# Patient Record
Sex: FEMALE | Race: WHITE | Employment: OTHER | URBAN - METROPOLITAN AREA
[De-identification: names, ages, dates, MRNs, and addresses within clinical notes are randomized per-mention and may not be internally consistent; named-entity substitution may affect disease eponyms.]

---

## 2017-01-10 ENCOUNTER — TELEPHONE (OUTPATIENT)
Dept: PULMONOLOGY | Age: 72
End: 2017-01-10

## 2017-01-10 RX ORDER — ALBUTEROL SULFATE 2.5 MG/3ML
2.5 SOLUTION RESPIRATORY (INHALATION) 4 TIMES DAILY
COMMUNITY
End: 2020-03-03

## 2017-01-27 ENCOUNTER — OFFICE VISIT (OUTPATIENT)
Dept: SURGERY | Age: 72
End: 2017-01-27

## 2017-01-27 ENCOUNTER — PROCEDURE VISIT (OUTPATIENT)
Dept: SURGERY | Age: 72
End: 2017-01-27

## 2017-01-27 VITALS
DIASTOLIC BLOOD PRESSURE: 96 MMHG | HEIGHT: 64 IN | WEIGHT: 218 LBS | SYSTOLIC BLOOD PRESSURE: 158 MMHG | BODY MASS INDEX: 37.22 KG/M2

## 2017-01-27 DIAGNOSIS — Z86.718 HISTORY OF DVT (DEEP VEIN THROMBOSIS): Chronic | ICD-10-CM

## 2017-01-27 DIAGNOSIS — I71.40 AAA (ABDOMINAL AORTIC ANEURYSM) WITHOUT RUPTURE: Primary | Chronic | ICD-10-CM

## 2017-01-27 DIAGNOSIS — I71.40 AAA (ABDOMINAL AORTIC ANEURYSM) WITHOUT RUPTURE: Chronic | ICD-10-CM

## 2017-01-27 DIAGNOSIS — I10 ESSENTIAL HYPERTENSION: Chronic | ICD-10-CM

## 2017-01-27 PROCEDURE — 3014F SCREEN MAMMO DOC REV: CPT | Performed by: SURGERY

## 2017-01-27 PROCEDURE — 1090F PRES/ABSN URINE INCON ASSESS: CPT | Performed by: SURGERY

## 2017-01-27 PROCEDURE — 3017F COLORECTAL CA SCREEN DOC REV: CPT | Performed by: SURGERY

## 2017-01-27 PROCEDURE — 1036F TOBACCO NON-USER: CPT | Performed by: SURGERY

## 2017-01-27 PROCEDURE — G8427 DOCREV CUR MEDS BY ELIG CLIN: HCPCS | Performed by: SURGERY

## 2017-01-27 PROCEDURE — 99214 OFFICE O/P EST MOD 30 MIN: CPT | Performed by: SURGERY

## 2017-01-27 PROCEDURE — 1123F ACP DISCUSS/DSCN MKR DOCD: CPT | Performed by: SURGERY

## 2017-01-27 PROCEDURE — G8399 PT W/DXA RESULTS DOCUMENT: HCPCS | Performed by: SURGERY

## 2017-01-27 PROCEDURE — 93978 VASCULAR STUDY: CPT | Performed by: SURGERY

## 2017-01-27 PROCEDURE — G8598 ASA/ANTIPLAT THER USED: HCPCS | Performed by: SURGERY

## 2017-01-27 PROCEDURE — G8484 FLU IMMUNIZE NO ADMIN: HCPCS | Performed by: SURGERY

## 2017-01-27 PROCEDURE — G8419 CALC BMI OUT NRM PARAM NOF/U: HCPCS | Performed by: SURGERY

## 2017-01-27 PROCEDURE — 4040F PNEUMOC VAC/ADMIN/RCVD: CPT | Performed by: SURGERY

## 2017-02-16 ENCOUNTER — OFFICE VISIT (OUTPATIENT)
Dept: PULMONOLOGY | Age: 72
End: 2017-02-16

## 2017-02-16 VITALS
TEMPERATURE: 97.6 F | BODY MASS INDEX: 39.51 KG/M2 | OXYGEN SATURATION: 97 % | SYSTOLIC BLOOD PRESSURE: 150 MMHG | HEIGHT: 63 IN | RESPIRATION RATE: 16 BRPM | HEART RATE: 69 BPM | DIASTOLIC BLOOD PRESSURE: 80 MMHG | WEIGHT: 223 LBS

## 2017-02-16 DIAGNOSIS — Z23 NEED FOR VACCINATION WITH 13-POLYVALENT PNEUMOCOCCAL CONJUGATE VACCINE: ICD-10-CM

## 2017-02-16 DIAGNOSIS — I27.82 OTHER CHRONIC PULMONARY EMBOLISM WITHOUT ACUTE COR PULMONALE (HCC): ICD-10-CM

## 2017-02-16 DIAGNOSIS — Z87.891 HISTORY OF TOBACCO ABUSE: ICD-10-CM

## 2017-02-16 DIAGNOSIS — J44.9 COPD, SEVERE (HCC): Primary | ICD-10-CM

## 2017-02-16 PROCEDURE — 99214 OFFICE O/P EST MOD 30 MIN: CPT | Performed by: INTERNAL MEDICINE

## 2017-02-16 PROCEDURE — G8598 ASA/ANTIPLAT THER USED: HCPCS | Performed by: INTERNAL MEDICINE

## 2017-02-16 PROCEDURE — G8926 SPIRO NO PERF OR DOC: HCPCS | Performed by: INTERNAL MEDICINE

## 2017-02-16 PROCEDURE — G8484 FLU IMMUNIZE NO ADMIN: HCPCS | Performed by: INTERNAL MEDICINE

## 2017-02-16 PROCEDURE — G0009 ADMIN PNEUMOCOCCAL VACCINE: HCPCS | Performed by: INTERNAL MEDICINE

## 2017-02-16 PROCEDURE — G8399 PT W/DXA RESULTS DOCUMENT: HCPCS | Performed by: INTERNAL MEDICINE

## 2017-02-16 PROCEDURE — 3023F SPIROM DOC REV: CPT | Performed by: INTERNAL MEDICINE

## 2017-02-16 PROCEDURE — 1090F PRES/ABSN URINE INCON ASSESS: CPT | Performed by: INTERNAL MEDICINE

## 2017-02-16 PROCEDURE — G8417 CALC BMI ABV UP PARAM F/U: HCPCS | Performed by: INTERNAL MEDICINE

## 2017-02-16 PROCEDURE — 3017F COLORECTAL CA SCREEN DOC REV: CPT | Performed by: INTERNAL MEDICINE

## 2017-02-16 PROCEDURE — G8427 DOCREV CUR MEDS BY ELIG CLIN: HCPCS | Performed by: INTERNAL MEDICINE

## 2017-02-16 PROCEDURE — 1036F TOBACCO NON-USER: CPT | Performed by: INTERNAL MEDICINE

## 2017-02-16 PROCEDURE — 4040F PNEUMOC VAC/ADMIN/RCVD: CPT | Performed by: INTERNAL MEDICINE

## 2017-02-16 PROCEDURE — 1123F ACP DISCUSS/DSCN MKR DOCD: CPT | Performed by: INTERNAL MEDICINE

## 2017-02-16 PROCEDURE — 3014F SCREEN MAMMO DOC REV: CPT | Performed by: INTERNAL MEDICINE

## 2017-02-16 PROCEDURE — 90670 PCV13 VACCINE IM: CPT | Performed by: INTERNAL MEDICINE

## 2017-04-12 ENCOUNTER — TELEPHONE (OUTPATIENT)
Dept: FAMILY MEDICINE CLINIC | Age: 72
End: 2017-04-12

## 2017-04-13 ENCOUNTER — TELEPHONE (OUTPATIENT)
Dept: FAMILY MEDICINE CLINIC | Age: 72
End: 2017-04-13

## 2017-04-17 ENCOUNTER — OFFICE VISIT (OUTPATIENT)
Dept: FAMILY MEDICINE CLINIC | Age: 72
End: 2017-04-17

## 2017-04-17 VITALS
WEIGHT: 215 LBS | HEIGHT: 63 IN | BODY MASS INDEX: 38.09 KG/M2 | TEMPERATURE: 97.5 F | DIASTOLIC BLOOD PRESSURE: 80 MMHG | HEART RATE: 72 BPM | SYSTOLIC BLOOD PRESSURE: 130 MMHG

## 2017-04-17 DIAGNOSIS — I10 ESSENTIAL HYPERTENSION: Primary | Chronic | ICD-10-CM

## 2017-04-17 DIAGNOSIS — M79.671 RIGHT FOOT PAIN: ICD-10-CM

## 2017-04-17 PROCEDURE — G8598 ASA/ANTIPLAT THER USED: HCPCS | Performed by: FAMILY MEDICINE

## 2017-04-17 PROCEDURE — 1090F PRES/ABSN URINE INCON ASSESS: CPT | Performed by: FAMILY MEDICINE

## 2017-04-17 PROCEDURE — 1036F TOBACCO NON-USER: CPT | Performed by: FAMILY MEDICINE

## 2017-04-17 PROCEDURE — 3017F COLORECTAL CA SCREEN DOC REV: CPT | Performed by: FAMILY MEDICINE

## 2017-04-17 PROCEDURE — 99213 OFFICE O/P EST LOW 20 MIN: CPT | Performed by: FAMILY MEDICINE

## 2017-04-17 PROCEDURE — G8417 CALC BMI ABV UP PARAM F/U: HCPCS | Performed by: FAMILY MEDICINE

## 2017-04-17 PROCEDURE — 4040F PNEUMOC VAC/ADMIN/RCVD: CPT | Performed by: FAMILY MEDICINE

## 2017-04-17 PROCEDURE — 3014F SCREEN MAMMO DOC REV: CPT | Performed by: FAMILY MEDICINE

## 2017-04-17 PROCEDURE — G8399 PT W/DXA RESULTS DOCUMENT: HCPCS | Performed by: FAMILY MEDICINE

## 2017-04-17 PROCEDURE — 1123F ACP DISCUSS/DSCN MKR DOCD: CPT | Performed by: FAMILY MEDICINE

## 2017-04-17 PROCEDURE — G8427 DOCREV CUR MEDS BY ELIG CLIN: HCPCS | Performed by: FAMILY MEDICINE

## 2017-04-17 RX ORDER — ALPRAZOLAM 0.25 MG/1
0.25 TABLET ORAL NIGHTLY PRN
Qty: 30 TABLET | Refills: 0 | Status: SHIPPED | OUTPATIENT
Start: 2017-04-17 | End: 2017-08-10 | Stop reason: SDUPTHER

## 2017-04-17 ASSESSMENT — ENCOUNTER SYMPTOMS: SHORTNESS OF BREATH: 0

## 2017-06-08 ENCOUNTER — OFFICE VISIT (OUTPATIENT)
Dept: FAMILY MEDICINE CLINIC | Age: 72
End: 2017-06-08

## 2017-06-08 VITALS
WEIGHT: 218.8 LBS | DIASTOLIC BLOOD PRESSURE: 86 MMHG | SYSTOLIC BLOOD PRESSURE: 138 MMHG | TEMPERATURE: 98.2 F | HEIGHT: 63 IN | BODY MASS INDEX: 38.77 KG/M2

## 2017-06-08 DIAGNOSIS — R19.7 DIARRHEA, UNSPECIFIED TYPE: Primary | ICD-10-CM

## 2017-06-08 PROCEDURE — G8399 PT W/DXA RESULTS DOCUMENT: HCPCS | Performed by: FAMILY MEDICINE

## 2017-06-08 PROCEDURE — G8598 ASA/ANTIPLAT THER USED: HCPCS | Performed by: FAMILY MEDICINE

## 2017-06-08 PROCEDURE — G8427 DOCREV CUR MEDS BY ELIG CLIN: HCPCS | Performed by: FAMILY MEDICINE

## 2017-06-08 PROCEDURE — G8417 CALC BMI ABV UP PARAM F/U: HCPCS | Performed by: FAMILY MEDICINE

## 2017-06-08 PROCEDURE — 99213 OFFICE O/P EST LOW 20 MIN: CPT | Performed by: FAMILY MEDICINE

## 2017-06-08 PROCEDURE — 1123F ACP DISCUSS/DSCN MKR DOCD: CPT | Performed by: FAMILY MEDICINE

## 2017-06-08 PROCEDURE — 1036F TOBACCO NON-USER: CPT | Performed by: FAMILY MEDICINE

## 2017-06-08 PROCEDURE — 3014F SCREEN MAMMO DOC REV: CPT | Performed by: FAMILY MEDICINE

## 2017-06-08 PROCEDURE — 4040F PNEUMOC VAC/ADMIN/RCVD: CPT | Performed by: FAMILY MEDICINE

## 2017-06-08 PROCEDURE — 3017F COLORECTAL CA SCREEN DOC REV: CPT | Performed by: FAMILY MEDICINE

## 2017-06-08 PROCEDURE — 1090F PRES/ABSN URINE INCON ASSESS: CPT | Performed by: FAMILY MEDICINE

## 2017-06-26 DIAGNOSIS — R19.7 DIARRHEA, UNSPECIFIED TYPE: ICD-10-CM

## 2017-06-26 LAB
C DIFFICILE TOXIN, EIA: NORMAL
GI BACTERIAL PATHOGENS BY PCR: NORMAL
WHITE BLOOD CELLS (WBC), STOOL: NORMAL

## 2017-08-09 ENCOUNTER — PROCEDURE VISIT (OUTPATIENT)
Dept: SURGERY | Age: 72
End: 2017-08-09

## 2017-08-09 ENCOUNTER — OFFICE VISIT (OUTPATIENT)
Dept: SURGERY | Age: 72
End: 2017-08-09

## 2017-08-09 VITALS
WEIGHT: 219 LBS | DIASTOLIC BLOOD PRESSURE: 83 MMHG | HEIGHT: 61 IN | SYSTOLIC BLOOD PRESSURE: 174 MMHG | HEART RATE: 76 BPM | BODY MASS INDEX: 41.35 KG/M2

## 2017-08-09 DIAGNOSIS — Z86.718 HISTORY OF DVT (DEEP VEIN THROMBOSIS): Chronic | ICD-10-CM

## 2017-08-09 DIAGNOSIS — I71.40 AAA (ABDOMINAL AORTIC ANEURYSM) WITHOUT RUPTURE: Primary | Chronic | ICD-10-CM

## 2017-08-09 DIAGNOSIS — I10 ESSENTIAL HYPERTENSION: Chronic | ICD-10-CM

## 2017-08-09 PROCEDURE — G8417 CALC BMI ABV UP PARAM F/U: HCPCS | Performed by: NURSE PRACTITIONER

## 2017-08-09 PROCEDURE — 99214 OFFICE O/P EST MOD 30 MIN: CPT | Performed by: NURSE PRACTITIONER

## 2017-08-09 PROCEDURE — 3017F COLORECTAL CA SCREEN DOC REV: CPT | Performed by: NURSE PRACTITIONER

## 2017-08-09 PROCEDURE — 1090F PRES/ABSN URINE INCON ASSESS: CPT | Performed by: NURSE PRACTITIONER

## 2017-08-09 PROCEDURE — 1123F ACP DISCUSS/DSCN MKR DOCD: CPT | Performed by: NURSE PRACTITIONER

## 2017-08-09 PROCEDURE — G8598 ASA/ANTIPLAT THER USED: HCPCS | Performed by: NURSE PRACTITIONER

## 2017-08-09 PROCEDURE — 1036F TOBACCO NON-USER: CPT | Performed by: NURSE PRACTITIONER

## 2017-08-09 PROCEDURE — G8399 PT W/DXA RESULTS DOCUMENT: HCPCS | Performed by: NURSE PRACTITIONER

## 2017-08-09 PROCEDURE — 3014F SCREEN MAMMO DOC REV: CPT | Performed by: NURSE PRACTITIONER

## 2017-08-09 PROCEDURE — G8427 DOCREV CUR MEDS BY ELIG CLIN: HCPCS | Performed by: NURSE PRACTITIONER

## 2017-08-09 PROCEDURE — 4040F PNEUMOC VAC/ADMIN/RCVD: CPT | Performed by: NURSE PRACTITIONER

## 2017-08-09 PROCEDURE — 93978 VASCULAR STUDY: CPT | Performed by: SURGERY

## 2017-08-09 ASSESSMENT — ENCOUNTER SYMPTOMS
BACK PAIN: 1
ABDOMINAL PAIN: 0

## 2017-08-11 RX ORDER — ALPRAZOLAM 0.25 MG/1
0.25 TABLET ORAL NIGHTLY PRN
Qty: 30 TABLET | Refills: 0 | Status: SHIPPED | OUTPATIENT
Start: 2017-08-11 | End: 2017-11-07 | Stop reason: SDUPTHER

## 2017-08-31 ENCOUNTER — OFFICE VISIT (OUTPATIENT)
Dept: PULMONOLOGY | Age: 72
End: 2017-08-31

## 2017-08-31 VITALS
BODY MASS INDEX: 38.98 KG/M2 | OXYGEN SATURATION: 97 % | HEIGHT: 63 IN | SYSTOLIC BLOOD PRESSURE: 150 MMHG | HEART RATE: 78 BPM | DIASTOLIC BLOOD PRESSURE: 80 MMHG | TEMPERATURE: 98.1 F | RESPIRATION RATE: 20 BRPM | WEIGHT: 220 LBS

## 2017-08-31 DIAGNOSIS — Z87.891 PERSONAL HISTORY OF TOBACCO USE: ICD-10-CM

## 2017-08-31 DIAGNOSIS — J44.9 COPD, SEVERE (HCC): Primary | ICD-10-CM

## 2017-08-31 PROCEDURE — G8417 CALC BMI ABV UP PARAM F/U: HCPCS | Performed by: INTERNAL MEDICINE

## 2017-08-31 PROCEDURE — G0296 VISIT TO DETERM LDCT ELIG: HCPCS | Performed by: INTERNAL MEDICINE

## 2017-08-31 PROCEDURE — G8598 ASA/ANTIPLAT THER USED: HCPCS | Performed by: INTERNAL MEDICINE

## 2017-08-31 PROCEDURE — 4040F PNEUMOC VAC/ADMIN/RCVD: CPT | Performed by: INTERNAL MEDICINE

## 2017-08-31 PROCEDURE — G8399 PT W/DXA RESULTS DOCUMENT: HCPCS | Performed by: INTERNAL MEDICINE

## 2017-08-31 PROCEDURE — 1090F PRES/ABSN URINE INCON ASSESS: CPT | Performed by: INTERNAL MEDICINE

## 2017-08-31 PROCEDURE — 3023F SPIROM DOC REV: CPT | Performed by: INTERNAL MEDICINE

## 2017-08-31 PROCEDURE — G8926 SPIRO NO PERF OR DOC: HCPCS | Performed by: INTERNAL MEDICINE

## 2017-08-31 PROCEDURE — 99214 OFFICE O/P EST MOD 30 MIN: CPT | Performed by: INTERNAL MEDICINE

## 2017-08-31 PROCEDURE — 1036F TOBACCO NON-USER: CPT | Performed by: INTERNAL MEDICINE

## 2017-08-31 PROCEDURE — 3017F COLORECTAL CA SCREEN DOC REV: CPT | Performed by: INTERNAL MEDICINE

## 2017-08-31 PROCEDURE — 1123F ACP DISCUSS/DSCN MKR DOCD: CPT | Performed by: INTERNAL MEDICINE

## 2017-08-31 PROCEDURE — 3014F SCREEN MAMMO DOC REV: CPT | Performed by: INTERNAL MEDICINE

## 2017-08-31 PROCEDURE — G8427 DOCREV CUR MEDS BY ELIG CLIN: HCPCS | Performed by: INTERNAL MEDICINE

## 2017-09-07 ENCOUNTER — HOSPITAL ENCOUNTER (OUTPATIENT)
Dept: CT IMAGING | Age: 72
Discharge: HOME | End: 2017-09-07
Attending: INTERNAL MEDICINE | Admitting: INTERNAL MEDICINE

## 2017-09-07 DIAGNOSIS — Z87.891 PERSONAL HISTORY OF NICOTINE DEPENDENCE: ICD-10-CM

## 2017-09-07 DIAGNOSIS — Z87.891 PERSONAL HISTORY OF TOBACCO USE: ICD-10-CM

## 2017-09-13 DIAGNOSIS — J44.9 CHRONIC OBSTRUCTIVE PULMONARY DISEASE, UNSPECIFIED COPD TYPE (HCC): ICD-10-CM

## 2017-09-14 RX ORDER — ALBUTEROL SULFATE 90 UG/1
2 AEROSOL, METERED RESPIRATORY (INHALATION) EVERY 4 HOURS PRN
Qty: 1 INHALER | Refills: 5 | Status: SHIPPED | OUTPATIENT
Start: 2017-09-14 | End: 2019-11-20 | Stop reason: SDUPTHER

## 2017-09-18 RX ORDER — RIVAROXABAN 20 MG/1
TABLET, FILM COATED ORAL
Qty: 30 TABLET | Refills: 5 | Status: SHIPPED | OUTPATIENT
Start: 2017-09-18 | End: 2018-10-04 | Stop reason: SDUPTHER

## 2017-09-19 ENCOUNTER — TELEPHONE (OUTPATIENT)
Dept: FAMILY MEDICINE CLINIC | Age: 72
End: 2017-09-19

## 2017-10-12 NOTE — TELEPHONE ENCOUNTER
Yes.   Being off for 24 hours is usually fine, but holding longer won't hurt. She should just resume it the day after the procedure as long as she does not suffer significant bleeding.   She should confirm with the Dr's office doing the procedure too

## 2017-10-13 ENCOUNTER — OFFICE VISIT (OUTPATIENT)
Dept: FAMILY MEDICINE CLINIC | Age: 72
End: 2017-10-13

## 2017-10-13 VITALS
WEIGHT: 213 LBS | TEMPERATURE: 98.3 F | HEIGHT: 63 IN | BODY MASS INDEX: 37.74 KG/M2 | DIASTOLIC BLOOD PRESSURE: 84 MMHG | SYSTOLIC BLOOD PRESSURE: 136 MMHG

## 2017-10-13 DIAGNOSIS — I26.99 PE (PULMONARY THROMBOEMBOLISM) (HCC): ICD-10-CM

## 2017-10-13 DIAGNOSIS — I25.10 CORONARY ARTERY DISEASE INVOLVING NATIVE HEART WITHOUT ANGINA PECTORIS, UNSPECIFIED VESSEL OR LESION TYPE: Chronic | ICD-10-CM

## 2017-10-13 DIAGNOSIS — I10 ESSENTIAL HYPERTENSION: Chronic | ICD-10-CM

## 2017-10-13 DIAGNOSIS — R10.2 PELVIC PAIN IN FEMALE: Primary | ICD-10-CM

## 2017-10-13 DIAGNOSIS — G47.33 OBSTRUCTIVE SLEEP APNEA SYNDROME: Chronic | ICD-10-CM

## 2017-10-13 DIAGNOSIS — E78.2 MIXED HYPERLIPIDEMIA: Chronic | ICD-10-CM

## 2017-10-13 PROCEDURE — 1123F ACP DISCUSS/DSCN MKR DOCD: CPT | Performed by: INTERNAL MEDICINE

## 2017-10-13 PROCEDURE — 1090F PRES/ABSN URINE INCON ASSESS: CPT | Performed by: INTERNAL MEDICINE

## 2017-10-13 PROCEDURE — G8484 FLU IMMUNIZE NO ADMIN: HCPCS | Performed by: INTERNAL MEDICINE

## 2017-10-13 PROCEDURE — 4040F PNEUMOC VAC/ADMIN/RCVD: CPT | Performed by: INTERNAL MEDICINE

## 2017-10-13 PROCEDURE — G8399 PT W/DXA RESULTS DOCUMENT: HCPCS | Performed by: INTERNAL MEDICINE

## 2017-10-13 PROCEDURE — 99214 OFFICE O/P EST MOD 30 MIN: CPT | Performed by: INTERNAL MEDICINE

## 2017-10-13 PROCEDURE — G8417 CALC BMI ABV UP PARAM F/U: HCPCS | Performed by: INTERNAL MEDICINE

## 2017-10-13 PROCEDURE — 3017F COLORECTAL CA SCREEN DOC REV: CPT | Performed by: INTERNAL MEDICINE

## 2017-10-13 PROCEDURE — 3014F SCREEN MAMMO DOC REV: CPT | Performed by: INTERNAL MEDICINE

## 2017-10-13 PROCEDURE — G8427 DOCREV CUR MEDS BY ELIG CLIN: HCPCS | Performed by: INTERNAL MEDICINE

## 2017-10-13 PROCEDURE — 1036F TOBACCO NON-USER: CPT | Performed by: INTERNAL MEDICINE

## 2017-10-13 PROCEDURE — G8598 ASA/ANTIPLAT THER USED: HCPCS | Performed by: INTERNAL MEDICINE

## 2017-10-13 ASSESSMENT — ENCOUNTER SYMPTOMS
CONSTIPATION: 0
SHORTNESS OF BREATH: 0
WHEEZING: 0
NAUSEA: 0
BLOOD IN STOOL: 0
RHINORRHEA: 0
ABDOMINAL PAIN: 0
DIARRHEA: 0
SORE THROAT: 0
APNEA: 0
COUGH: 0
SINUS PAIN: 0

## 2017-10-13 NOTE — PROGRESS NOTES
Subjective:      Patient ID: Javier Roldan is a 71 y.o. female.     HPI   Chief Complaint   Patient presents with    Pre-op Exam     biopsy with Dr. Ty Espinal is a 71 y.o. female with the following history as recorded in [facility]:  Patient Active Problem List    Diagnosis Date Noted    Bilateral pulmonary embolism ([facility] [address]      Priority: High    AAA (abdominal aortic aneurysm) without rupture ([facility] [address] 02/10/2015     Priority: High    History of DVT (deep vein thrombosis) 05/08/2013     Priority: High    Family history of DVT 05/08/2013     Priority: High    Coronary artery disease 09/09/2011     Priority: High     Class: Chronic    Sleep apnea 09/09/2011     Priority: High    Osteoarthritis of left knee 05/08/2013     Priority: Medium    Hypertension 09/09/2011     Priority: Medium     Class: Chronic    Hyperlipidemia 09/09/2011     Priority: Medium     Class: Chronic    Pleural effusion, left 06/29/2015    Dyspnea and respiratory abnormality     Pleural effusion     PE (pulmonary thromboembolism) (HCC)     COPD exacerbation (HCC)     DVT, bilateral lower limbs (HCC) 06/09/2015    Agitation     Cholecystitis 10/10/2013    Myalgia 11/07/2012     Current Outpatient Prescriptions   Medication Sig Dispense Refill    XARELTO 20 MG TABS tablet TAKE ONE TABLET BY MOUTH DAILY WITH BREAKFAST 30 tablet 5    albuterol sulfate HFA (PROAIR HFA) 108 (90 Base) MCG/ACT inhaler Inhale 2 puffs into the lungs every 4 hours as needed for Wheezing or Shortness of Breath 1 Inhaler 5    ALPRAZolam (XANAX) 0.25 MG tablet Take 1 tablet by mouth nightly as needed for Sleep or Anxiety 30 tablet 0    albuterol (PROVENTIL) (2.5 MG/3ML) 0.083% nebulizer solution Take 2.5 mg by nebulization 4 times daily      Rosuvastatin Calcium (CRESTOR PO) Take by mouth      SPIRIVA RESPIMAT 2.5 MCG/ACT AERS inhaler INHALE TWO PUFF(S) BY MOUTH DAILY 1 Inhaler 5    nitroGLYCERIN (NITROSTAT) 0.4 MG SL tablet Place 1 tablet under the tongue every 5 minutes as needed for Chest pain 25 tablet 1    amLODIPine (NORVASC) 5 MG tablet Take 5 mg by mouth daily      furosemide (LASIX) 40 MG tablet Take 40 mg by mouth daily      isosorbide mononitrate (IMDUR) 30 MG CR tablet Take 30 mg by mouth daily      potassium chloride SA (K-DUR;KLOR-CON M) 10 MEQ tablet Take 10 mEq by mouth 2 times daily      Multiple Vitamins-Minerals (MULTI FOR HER 50+ PO) Take 1 tablet by mouth daily       No current facility-administered medications for this visit. Allergies: Morphine  Past Medical History:   Diagnosis Date    AAA (abdominal aortic aneurysm) ([facility] [address]     pt states it is 4cm    AAA (abdominal aortic aneurysm) without rupture ([facility] [address] 2/10/2015    History of blood clots     Hyperlipidemia     Hypertension      Past Surgical History:   Procedure Laterality Date    APPENDECTOMY  1990    incidental    BLADDER SUSPENSION      CATARACT REMOVAL      CHOLECYSTECTOMY  10/15/13    COLONOSCOPY  9/2/07    dr Alford Line and check in 5 years[address]    for benign tumor. just the uterus    TONSILLECTOMY  as a child    TUMOR EXCISION      benign behind right ear about 2008     Family History   Problem Relation Age of Onset    Heart Disease Father     Hypertension Other      Social History   Substance Use Topics    Smoking status: Former Smoker     Packs/day: 1.00     Years: 37.00     Types: Cigarettes     Start date: 12/2/1976     Quit date: 9/17/2013    Smokeless tobacco: Never Used    Alcohol use No       Review of Systems   Constitutional: Negative for chills, diaphoresis and fatigue. HENT: Negative for congestion, postnasal drip, rhinorrhea, sinus pain and sore throat. Eyes: Negative for visual disturbance. Respiratory: Negative for apnea, cough, shortness of breath and wheezing. Cardiovascular: Negative for chest pain and palpitations.    Gastrointestinal: Negative for abdominal pain, blood in stool,

## 2017-11-07 ENCOUNTER — OFFICE VISIT (OUTPATIENT)
Dept: FAMILY MEDICINE CLINIC | Age: 72
End: 2017-11-07

## 2017-11-07 VITALS
HEART RATE: 68 BPM | TEMPERATURE: 98.2 F | DIASTOLIC BLOOD PRESSURE: 82 MMHG | SYSTOLIC BLOOD PRESSURE: 136 MMHG | WEIGHT: 220 LBS | BODY MASS INDEX: 38.98 KG/M2 | HEIGHT: 63 IN

## 2017-11-07 DIAGNOSIS — E78.2 MIXED HYPERLIPIDEMIA: Chronic | ICD-10-CM

## 2017-11-07 DIAGNOSIS — E55.9 VITAMIN D DEFICIENCY: ICD-10-CM

## 2017-11-07 DIAGNOSIS — I10 ESSENTIAL HYPERTENSION: Primary | Chronic | ICD-10-CM

## 2017-11-07 DIAGNOSIS — G47.33 OBSTRUCTIVE SLEEP APNEA SYNDROME: Chronic | ICD-10-CM

## 2017-11-07 DIAGNOSIS — I10 ESSENTIAL HYPERTENSION: Chronic | ICD-10-CM

## 2017-11-07 LAB
A/G RATIO: 1.4 (ref 1.1–2.2)
ALBUMIN SERPL-MCNC: 4.3 G/DL (ref 3.4–5)
ALP BLD-CCNC: 98 U/L (ref 40–129)
ALT SERPL-CCNC: 14 U/L (ref 10–40)
ANION GAP SERPL CALCULATED.3IONS-SCNC: 16 MMOL/L (ref 3–16)
AST SERPL-CCNC: 15 U/L (ref 15–37)
BILIRUB SERPL-MCNC: 0.3 MG/DL (ref 0–1)
BILIRUBIN URINE: NEGATIVE
BLOOD, URINE: NEGATIVE
BUN BLDV-MCNC: 23 MG/DL (ref 7–20)
CALCIUM SERPL-MCNC: 9.5 MG/DL (ref 8.3–10.6)
CHLORIDE BLD-SCNC: 99 MMOL/L (ref 99–110)
CHOLESTEROL, TOTAL: 199 MG/DL (ref 0–199)
CLARITY: CLEAR
CO2: 27 MMOL/L (ref 21–32)
COLOR: YELLOW
CREAT SERPL-MCNC: 1 MG/DL (ref 0.6–1.2)
EPITHELIAL CELLS, UA: 2 /HPF (ref 0–5)
GFR AFRICAN AMERICAN: >60
GFR NON-AFRICAN AMERICAN: 55
GLOBULIN: 3 G/DL
GLUCOSE BLD-MCNC: 85 MG/DL (ref 70–99)
GLUCOSE URINE: NEGATIVE MG/DL
HDLC SERPL-MCNC: 52 MG/DL (ref 40–60)
HYALINE CASTS: 0 /LPF (ref 0–8)
KETONES, URINE: NEGATIVE MG/DL
LDL CHOLESTEROL CALCULATED: 119 MG/DL
LEUKOCYTE ESTERASE, URINE: NEGATIVE
MICROSCOPIC EXAMINATION: YES
NITRITE, URINE: NEGATIVE
PH UA: 7.5
POTASSIUM SERPL-SCNC: 4.7 MMOL/L (ref 3.5–5.1)
PROTEIN UA: 30 MG/DL
RBC UA: 2 /HPF (ref 0–4)
SODIUM BLD-SCNC: 142 MMOL/L (ref 136–145)
SPECIFIC GRAVITY UA: 1.01
TOTAL PROTEIN: 7.3 G/DL (ref 6.4–8.2)
TRIGL SERPL-MCNC: 139 MG/DL (ref 0–150)
TSH SERPL DL<=0.05 MIU/L-ACNC: 0.65 UIU/ML (ref 0.27–4.2)
UROBILINOGEN, URINE: 0.2 E.U./DL
VITAMIN D 25-HYDROXY: 31.2 NG/ML
VLDLC SERPL CALC-MCNC: 28 MG/DL
WBC UA: 4 /HPF (ref 0–5)

## 2017-11-07 PROCEDURE — G8417 CALC BMI ABV UP PARAM F/U: HCPCS | Performed by: FAMILY MEDICINE

## 2017-11-07 PROCEDURE — 1036F TOBACCO NON-USER: CPT | Performed by: FAMILY MEDICINE

## 2017-11-07 PROCEDURE — 3017F COLORECTAL CA SCREEN DOC REV: CPT | Performed by: FAMILY MEDICINE

## 2017-11-07 PROCEDURE — G8598 ASA/ANTIPLAT THER USED: HCPCS | Performed by: FAMILY MEDICINE

## 2017-11-07 PROCEDURE — 3014F SCREEN MAMMO DOC REV: CPT | Performed by: FAMILY MEDICINE

## 2017-11-07 PROCEDURE — G8484 FLU IMMUNIZE NO ADMIN: HCPCS | Performed by: FAMILY MEDICINE

## 2017-11-07 PROCEDURE — 99214 OFFICE O/P EST MOD 30 MIN: CPT | Performed by: FAMILY MEDICINE

## 2017-11-07 PROCEDURE — G8399 PT W/DXA RESULTS DOCUMENT: HCPCS | Performed by: FAMILY MEDICINE

## 2017-11-07 PROCEDURE — G8427 DOCREV CUR MEDS BY ELIG CLIN: HCPCS | Performed by: FAMILY MEDICINE

## 2017-11-07 PROCEDURE — 4040F PNEUMOC VAC/ADMIN/RCVD: CPT | Performed by: FAMILY MEDICINE

## 2017-11-07 PROCEDURE — 1090F PRES/ABSN URINE INCON ASSESS: CPT | Performed by: FAMILY MEDICINE

## 2017-11-07 PROCEDURE — 1123F ACP DISCUSS/DSCN MKR DOCD: CPT | Performed by: FAMILY MEDICINE

## 2017-11-07 RX ORDER — ALPRAZOLAM 0.25 MG/1
0.25 TABLET ORAL NIGHTLY PRN
Qty: 30 TABLET | Refills: 0 | Status: SHIPPED | OUTPATIENT
Start: 2017-11-07 | End: 2018-02-14 | Stop reason: SDUPTHER

## 2017-11-07 ASSESSMENT — ENCOUNTER SYMPTOMS
COUGH: 0
SHORTNESS OF BREATH: 0
VOICE CHANGE: 0
BLOOD IN STOOL: 0
DIARRHEA: 0
CONSTIPATION: 0
NAUSEA: 0
ABDOMINAL DISTENTION: 0
VOMITING: 0
TROUBLE SWALLOWING: 0
CHEST TIGHTNESS: 0
SORE THROAT: 0
ABDOMINAL PAIN: 0

## 2017-11-07 NOTE — PATIENT INSTRUCTIONS
Do stay with the current medication  Do watch the diet  See us back in 6 months        Patient Education        Preventing Falls: Care Instructions  Your Care Instructions  Getting around your home safely can be a challenge if you have injuries or health problems that make it easy for you to fall. Loose rugs and furniture in walkways are among the dangers for many older people who have problems walking or who have poor eyesight. People who have conditions such as arthritis, osteoporosis, or dementia also have to be careful not to fall. You can make your home safer with a few simple measures. Follow-up care is a key part of your treatment and safety. Be sure to make and go to all appointments, and call your doctor if you are having problems. It's also a good idea to know your test results and keep a list of the medicines you take. How can you care for yourself at home? Taking care of yourself  · You may get dizzy if you do not drink enough water. To prevent dehydration, drink plenty of fluids, enough so that your urine is light yellow or clear like water. Choose water and other caffeine-free clear liquids. If you have kidney, heart, or liver disease and have to limit fluids, talk with your doctor before you increase the amount of fluids you drink. · Exercise regularly to improve your strength, muscle tone, and balance. Walk if you can. Swimming may be a good choice if you cannot walk easily. · Have your vision and hearing checked each year or any time you notice a change. If you have trouble seeing and hearing, you might not be able to avoid objects and could lose your balance. · Know the side effects of the medicines you take. Ask your doctor or pharmacist whether the medicines you take can affect your balance. Sleeping pills or sedatives can affect your balance. · Limit the amount of alcohol you drink. Alcohol can impair your balance and other senses.   · Ask your doctor whether calluses or corns on your toilet and sinks. · Use shower chairs and bath benches. · Use a hand-held shower head that will allow you to sit while showering. · Get into a tub or shower by putting the weaker leg in first. Get out of a tub or shower with your strong side first.  · Repair loose toilet seats and consider installing a raised toilet seat to make getting on and off the toilet easier. · Keep your bathroom door unlocked while you are in the shower. Where can you learn more? Go to [email or web address] and sign in to your [employer] account. Enter [phone] in the [employer] box to learn more about \"Preventing Falls: Care Instructions. \"     If you do not have an account, please click on the \"Sign Up Now\" link. Current as of: August 4, 2016  Content Version: 11.3  © 8076-6536 [employer]. Care instructions adapted under license by [facility] ([facility]). If you have questions about a medical condition or this instruction, always ask your healthcare professional. [place] [address] any warranty or liability for your use of this information. Patient Education        Preventing Outdoor Falls: Care Instructions  Your Care Instructions  Worries about falls don't need to keep you indoors. Outdoor activities like walking have big benefits for your health. You will need to watch your step and learn a few safety measures. If you are worried about having a fall outdoors, ask your doctor about exercises, classes, or physical therapy that may help. You can learn ways to gain strength, flexibility, and balance. Ask if it might help to use a cane or walker. You can make your time outdoors safer with a few simple measures. Follow-up care is a key part of your treatment and safety. Be sure to make and go to all appointments, and call your doctor if you are having problems. It's also a good idea to know your test results and keep a list of the medicines you take.   How can you prevent falls

## 2017-11-07 NOTE — PROGRESS NOTES
Subjective:      Patient ID: Jyoti Pisano is a 71 y.o. female.     Patient presents with:  Check-Up: hypertension,     She is actually feeling very well  She has no c/o  She is on the meds  She did see dr Savanna Nelson for check up    June 16 last year colon ok dr arndt repeat 5 years    YOB: 1945    Date of Visit:  11/7/2017     -- Morphine -- Rash    Current Outpatient Prescriptions:  XARELTO 20 MG TABS tablet, TAKE ONE TABLET BY MOUTH DAILY WITH BREAKFAST, Disp: 30 tablet, Rfl: 5  albuterol sulfate HFA (PROAIR HFA) 108 (90 Base) MCG/ACT inhaler, Inhale 2 puffs into the lungs every 4 hours as needed for Wheezing or Shortness of Breath, Disp: 1 Inhaler, Rfl: 5  ALPRAZolam (XANAX) 0.25 MG tablet, Take 1 tablet by mouth nightly as needed for Sleep or Anxiety, Disp: 30 tablet, Rfl: 0  albuterol (PROVENTIL) (2.5 MG/3ML) 0.083% nebulizer solution, Take 2.5 mg by nebulization 4 times daily, Disp: , Rfl:   Rosuvastatin Calcium (CRESTOR PO), Take by mouth, Disp: , Rfl:   SPIRIVA RESPIMAT 2.5 MCG/ACT AERS inhaler, INHALE TWO PUFF(S) BY MOUTH DAILY, Disp: 1 Inhaler, Rfl: 5  nitroGLYCERIN (NITROSTAT) 0.4 MG SL tablet, Place 1 tablet under the tongue every 5 minutes as needed for Chest pain, Disp: 25 tablet, Rfl: 1  amLODIPine (NORVASC) 5 MG tablet, Take 5 mg by mouth daily, Disp: , Rfl:   furosemide (LASIX) 40 MG tablet, Take 40 mg by mouth daily, Disp: , Rfl:   isosorbide mononitrate (IMDUR) 30 MG CR tablet, Take 30 mg by mouth daily, Disp: , Rfl:   potassium chloride SA (K-DUR;KLOR-CON M) 10 MEQ tablet, Take 10 mEq by mouth 2 times daily, Disp: , Rfl:   Multiple Vitamins-Minerals (MULTI FOR HER 50+ PO), Take 1 tablet by mouth daily, Disp: , Rfl:     No current facility-administered medications for this visit.       ---------------------------               11/07/17                      1312         ---------------------------   BP:          136/82         Site:       Left Arm        Position: Sitting        Cuff Size:   Large Adult      Pulse:         68           Temp:   98.2 °F (36.8 °C)   TempSrc:      Oral          Weight: 220 lb (99.8 kg)    Height:   5' 3\" (1.6 m)    ---------------------------  Body mass index is 38.97 kg/m². Wt Readings from Last 3 Encounters:  11/07/17 : 220 lb (99.8 kg)  10/13/17 : 213 lb (96.6 kg)  08/31/17 : 220 lb (99.8 kg)    BP Readings from Last 3 Encounters:  11/07/17 : 136/82  10/13/17 : 136/84  08/31/17 : (!) 150/80              Review of Systems   Constitutional: Negative for appetite change, chills, fever and unexpected weight change. HENT: Negative for sore throat, trouble swallowing and voice change. Respiratory: Negative for cough, chest tightness and shortness of breath. No hemoptysis   Cardiovascular: Negative for chest pain, palpitations and leg swelling. Gastrointestinal: Negative for abdominal distention, abdominal pain, blood in stool, constipation, diarrhea, nausea and vomiting. No gerd no dysphagia    Genitourinary: Negative for difficulty urinating, dysuria and hematuria. Neurological: Negative for headaches. Objective:   Physical Exam   Constitutional: She appears well-developed and well-nourished. No distress. HENT:   Head: Normocephalic and atraumatic. Mouth/Throat: Oropharynx is clear and moist.   Eyes: No scleral icterus. Neck: Neck supple. Carotid bruit is not present. No thyroid mass and no thyromegaly present. Cardiovascular: Normal rate, regular rhythm and normal heart sounds. Exam reveals no gallop and no friction rub. No murmur heard. Pulmonary/Chest: Effort normal and breath sounds normal. No accessory muscle usage. No tachypnea. No respiratory distress. She has no decreased breath sounds. She has no wheezes. She has no rhonchi. She has no rales. Abdominal: Soft.  Normal appearance and bowel sounds are normal. She exhibits no distension, no abdominal bruit, no pulsatile midline mass and no mass. There is no hepatosplenomegaly. There is no tenderness. There is no rigidity and no guarding. Lymphadenopathy:        Head (right side): No submental, no submandibular, no preauricular and no posterior auricular adenopathy present. Head (left side): No submental, no submandibular, no preauricular and no posterior auricular adenopathy present. She has no cervical adenopathy. Right: No supraclavicular adenopathy present. Left: No supraclavicular adenopathy present. Neurological: She is alert. She displays no tremor. Skin: Skin is warm, dry and intact. She is not diaphoretic. No cyanosis. No pallor. Nails show no clubbing. Psychiatric: She has a normal mood and affect. Her speech is normal.       Assessment:       1. Essential hypertension  Comprehensive Metabolic Panel    Lipid Panel    TSH without Reflex   2. Obstructive sleep apnea syndrome     3. Mixed hyperlipidemia  Comprehensive Metabolic Panel    Lipid Panel    TSH without Reflex   4.  Vitamin D deficiency  Vitamin D 25 Hydroxy       Educated on the meds and use  Print out info on fall prevention  She refuses flu shot  She declined shingle vaccine  She does not use the cpap machine  She refused to have mammogram      Plan:      Do stay with the current medication  Do watch the diet  See us back in 6 months

## 2017-11-21 ENCOUNTER — TELEPHONE (OUTPATIENT)
Dept: FAMILY MEDICINE CLINIC | Age: 72
End: 2017-11-21

## 2017-11-21 RX ORDER — AZITHROMYCIN 250 MG/1
TABLET, FILM COATED ORAL
Qty: 1 PACKET | Refills: 0 | Status: SHIPPED | OUTPATIENT
Start: 2017-11-21 | End: 2017-12-01

## 2017-12-21 ENCOUNTER — TELEPHONE (OUTPATIENT)
Dept: FAMILY MEDICINE CLINIC | Age: 72
End: 2017-12-21

## 2018-01-07 PROBLEM — A41.9 SEPSIS (HCC): Status: ACTIVE | Noted: 2018-01-07

## 2018-01-08 DIAGNOSIS — J44.9 CHRONIC OBSTRUCTIVE PULMONARY DISEASE, UNSPECIFIED COPD TYPE (HCC): ICD-10-CM

## 2018-01-17 ENCOUNTER — OFFICE VISIT (OUTPATIENT)
Dept: CARDIOLOGY CLINIC | Age: 73
End: 2018-01-17

## 2018-01-17 VITALS
HEIGHT: 63 IN | BODY MASS INDEX: 37.56 KG/M2 | DIASTOLIC BLOOD PRESSURE: 62 MMHG | WEIGHT: 212 LBS | OXYGEN SATURATION: 97 % | HEART RATE: 57 BPM | SYSTOLIC BLOOD PRESSURE: 124 MMHG

## 2018-01-17 DIAGNOSIS — I25.10 CORONARY ARTERY DISEASE INVOLVING NATIVE CORONARY ARTERY OF NATIVE HEART WITHOUT ANGINA PECTORIS: ICD-10-CM

## 2018-01-17 DIAGNOSIS — I50.33 ACUTE ON CHRONIC DIASTOLIC HF (HEART FAILURE) (HCC): ICD-10-CM

## 2018-01-17 DIAGNOSIS — I48.0 PAF (PAROXYSMAL ATRIAL FIBRILLATION) (HCC): Primary | ICD-10-CM

## 2018-01-17 DIAGNOSIS — I71.40 AAA (ABDOMINAL AORTIC ANEURYSM) WITHOUT RUPTURE: ICD-10-CM

## 2018-01-17 DIAGNOSIS — I10 ESSENTIAL HYPERTENSION: ICD-10-CM

## 2018-01-17 PROCEDURE — 99214 OFFICE O/P EST MOD 30 MIN: CPT | Performed by: NURSE PRACTITIONER

## 2018-01-17 PROCEDURE — 93000 ELECTROCARDIOGRAM COMPLETE: CPT | Performed by: NURSE PRACTITIONER

## 2018-01-17 PROCEDURE — 1111F DSCHRG MED/CURRENT MED MERGE: CPT | Performed by: NURSE PRACTITIONER

## 2018-01-17 PROCEDURE — 3014F SCREEN MAMMO DOC REV: CPT | Performed by: NURSE PRACTITIONER

## 2018-01-17 PROCEDURE — G8484 FLU IMMUNIZE NO ADMIN: HCPCS | Performed by: NURSE PRACTITIONER

## 2018-01-17 PROCEDURE — 1036F TOBACCO NON-USER: CPT | Performed by: NURSE PRACTITIONER

## 2018-01-17 PROCEDURE — G8598 ASA/ANTIPLAT THER USED: HCPCS | Performed by: NURSE PRACTITIONER

## 2018-01-17 PROCEDURE — 3017F COLORECTAL CA SCREEN DOC REV: CPT | Performed by: NURSE PRACTITIONER

## 2018-01-17 PROCEDURE — 1090F PRES/ABSN URINE INCON ASSESS: CPT | Performed by: NURSE PRACTITIONER

## 2018-01-17 PROCEDURE — G8399 PT W/DXA RESULTS DOCUMENT: HCPCS | Performed by: NURSE PRACTITIONER

## 2018-01-17 PROCEDURE — G8417 CALC BMI ABV UP PARAM F/U: HCPCS | Performed by: NURSE PRACTITIONER

## 2018-01-17 PROCEDURE — 4040F PNEUMOC VAC/ADMIN/RCVD: CPT | Performed by: NURSE PRACTITIONER

## 2018-01-17 PROCEDURE — 1123F ACP DISCUSS/DSCN MKR DOCD: CPT | Performed by: NURSE PRACTITIONER

## 2018-01-17 PROCEDURE — G8427 DOCREV CUR MEDS BY ELIG CLIN: HCPCS | Performed by: NURSE PRACTITIONER

## 2018-01-17 NOTE — PROGRESS NOTES
[facility]  Office Visit    Yury Pack  1945    January 17, 2018    CC:   Chief Complaint   Patient presents with    Atrial Fibrillation     hospital follow up    Shortness of Breath     with exertion     HPI:  The patient is 71 y.o. female with a past medical history significant for CAD, atrial fib, HTN, aortic aneurysm and COPD who is here for hospital follow up. Admitted 1/7/2018-1/12/2018  With SOB and chest pain and dx with acute on chronic diastolic HF (diuresed), and atrial fib. Meds adjusted and discharged with follow up here. Overall doing okay but continues to have SOB and fatigue. SOB has improved some and has begun walking very little. Previously followed Dr. Ector Child. She has had issues with SOB which ar longstanding. Sleeps at night with HOB slightly elevated (chronic). + snorer, nocturia, daytime somnolence (napping during the day). Has known sleep apnea and absolutely intolerant to CPAP. Denies chest pain/discomfort,  PND, cough, palpitations, dizziness, syncope, edema ,weight change or claudication. Home weight: 208# and stable. + monitoring sodium and fluid intake    Review of Systems:  Constitutional: Denies falls, night sweats or fever. + fatigue/weakness  HEENT: Denies new visual changes, ringing in ears, nosebleeds, nasal congestion  Respiratory: + chronic orthopnea; SOB gradually improving  Cardiovascular: see HPI  GI: Denies N/V, diarrhea, constipation, abdominal pain, change in bowel habits, melena or hematochezia  : Denies urinary frequency, urgency, incontinence, hematuria or dysuria. Skin: Denies rash, hives, or cyanosis  Musculoskeletal: Denies joint or muscle aches/pain  Neurological: Denies syncope or TIA-like symptoms.   Psychiatric: Denies anxiety, insomnia or depression     Past Medical History:   Diagnosis Date    AAA (abdominal aortic aneurysm) ([facility] [address]     pt states it is 4cm    AAA (abdominal aortic aneurysm) without rupture ([address] 2/10/2015    Atrial fibrillation (HCC)     CHF (congestive heart failure) (HCC)     History of blood clots     Hyperlipidemia     Hypertension      Past Surgical History:   Procedure Laterality Date    APPENDECTOMY  1990    incidental    BLADDER SUSPENSION      CATARACT REMOVAL      CHOLECYSTECTOMY  10/15/13    COLONOSCOPY  9/2/07    dr Leyla Aguero and check in 5 years.  COLONOSCOPY  06/16/2017    ok dr arndt, repeat 5 years   [address]    for benign tumor. just the uterus    TONSILLECTOMY  as a child    TUMOR EXCISION      benign behind right ear about 2008     Family History   Problem Relation Age of Onset    Heart Disease Father     Hypertension Other      Social History   Substance Use Topics    Smoking status: Former Smoker     Packs/day: 1.00     Years: 37.00     Types: Cigarettes     Start date: 12/2/1976     Quit date: 9/17/2013    Smokeless tobacco: Never Used    Alcohol use No       Allergies   Allergen Reactions    Morphine Rash     Current Outpatient Prescriptions   Medication Sig Dispense Refill    lisinopril (PRINIVIL;ZESTRIL) 10 MG tablet Take 1 tablet by mouth daily 30 tablet 3    metoprolol succinate (TOPROL XL) 50 MG extended release tablet Take 1 tablet by mouth daily 30 tablet 3    cefdinir (OMNICEF) 300 MG capsule Take 1 capsule by mouth every 12 hours for 5 days 10 capsule 0    furosemide (LASIX) 40 MG tablet Take 1 tablet by mouth 2 times daily 60 tablet 3    tiotropium (SPIRIVA RESPIMAT) 2.5 MCG/ACT AERS inhaler Inhale 2 puffs into the lungs daily 1 Inhaler 5    ALPRAZolam (XANAX) 0.25 MG tablet Take 1 tablet by mouth nightly as needed for Sleep or Anxiety .  30 tablet 0    XARELTO 20 MG TABS tablet TAKE ONE TABLET BY MOUTH DAILY WITH BREAKFAST 30 tablet 5    albuterol sulfate HFA (PROAIR HFA) 108 (90 Base) MCG/ACT inhaler Inhale 2 puffs into the lungs every 4 hours as needed for Wheezing or Shortness of Breath 1 Inhaler 5    albuterol (PROVENTIL) (2.5 HDL 38 09/09/2011    LDLCALC 119 11/07/2017    LDLDIRECT 111 09/06/2012    LABVLDL 28 11/07/2017     Lab Results   Component Value Date     01/12/2018    K 4.2 01/10/2018    CL 94 01/12/2018    CO2 35 01/12/2018    BUN 33 01/12/2018    CREATININE 1.2 01/12/2018    GLUCOSE 90 01/12/2018    GLUCOSE 102 07/14/2016    CALCIUM 8.5 01/12/2018      Lab Results   Component Value Date    WBC 12.0 (H) 01/12/2018    HGB 12.3 01/12/2018    HCT 37.2 01/12/2018    MCV 84.4 01/12/2018     01/12/2018     ECG 1/17/2018:  Sinus rhythm with nonspecific ST-T changes; 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 Echo 1/9/2018:  Mild concentric left ventricular hypertrophy.   Visually estimated ejection fraction of 65%.   Mitral annular calcification.   Mild left atrial dilation.   Mild aortic stenosis. (mean gradients 11AHYS)   The systolic pulmonary artery pressure (SPAP) estimated at 30 mmHg  (estimated RA pressure of 8 mmHg included). LHC: ([address]) 4/2017   of RCA chronic LAD 10-20% RA 4 PA24/9 16 wedge 9 LVEDP markedly elevated 30    Carotid US 10/2017 at [address]:  1. Estimated diameter reduction of the right internal carotid artery is  less than 50%. 2.  Estimated diameter reduction of the left internal carotid artery is  50-69%. 3.  The bilateral common carotid arteries reveal no evidence of   significant stenosis. 4.  There is greater than 50% stenosis of the right external carotid  artery. 5.  There is no evidence of significant stenosis in the left external  carotid artery. 6.  The bilateral vertebral arteries are patent with antegrade flow. 7.  The bilateral subclavian arteries reveal no evidence of significant  stenosis. 8.  There has been no significant change from the previous study of  4/21/2017.     Assessment:    1.  PAF (paroxysmal atrial fibrillation) (HCC)  -in SR on ECG today  -continue BB  -on long term anticoagulation with Xarelto  -CHADS VAsc score 7 for age, gender, DVT, CHF, HTN, CAD

## 2018-01-17 NOTE — LETTER
5. Coronary artery disease involving native coronary artery of native heart without angina pectoris  -known  of RCA; nonobstructive disease in LAD and circ  -recent slightly elevated troponin (0.11)  -will plan for eventual stress test as outpt (once abx completed)  -continue medical management with BB, statin, nitrate    6. Sleep apnea  -intolerant to CPAP    Plan:    Continue lasix, lisinopril, Toprol, Xarelto, imdur and crestor  Discussed low fat/low sodium diet and reinforced regular aerobic exercise. Plan for stress test once antibiotics completed  Check BMP and CBC  Follow up win office in 3-4 weeks with D. Enzweiler, CNP or sooner if needed    Return in about 4 weeks (around 2/14/2018) for 3-4 weeks with D. Enzweiler, CNP. Thanks for allowing me to participate in the care of this patient. If you have questions, please do not hesitate to call me. I look forward to following Michelle Harvey along with you.     Sincerely,        ANITA Caldera CNP

## 2018-01-18 NOTE — COMMUNICATION BODY
diet and reinforced regular aerobic exercise. Plan for stress test once antibiotics completed  Check BMP and CBC  Follow up win office in 3-4 weeks with D. Enzweiler, CNP or sooner if needed    Return in about 4 weeks (around 2/14/2018) for 3-4 weeks with D. Enzweiler, CNP. Thanks for allowing me to participate in the care of this patient.

## 2018-01-22 ENCOUNTER — OFFICE VISIT (OUTPATIENT)
Dept: FAMILY MEDICINE CLINIC | Age: 73
End: 2018-01-22

## 2018-01-22 VITALS
SYSTOLIC BLOOD PRESSURE: 122 MMHG | TEMPERATURE: 99.2 F | HEIGHT: 63 IN | HEART RATE: 72 BPM | WEIGHT: 215.78 LBS | DIASTOLIC BLOOD PRESSURE: 80 MMHG | BODY MASS INDEX: 38.23 KG/M2

## 2018-01-22 DIAGNOSIS — J18.9 ATYPICAL PNEUMONIA: ICD-10-CM

## 2018-01-22 DIAGNOSIS — I10 ESSENTIAL HYPERTENSION: Primary | Chronic | ICD-10-CM

## 2018-01-22 DIAGNOSIS — J10.1 INFLUENZA A: ICD-10-CM

## 2018-01-22 DIAGNOSIS — Z09 HOSPITAL DISCHARGE FOLLOW-UP: ICD-10-CM

## 2018-01-22 PROCEDURE — G8484 FLU IMMUNIZE NO ADMIN: HCPCS | Performed by: FAMILY MEDICINE

## 2018-01-22 PROCEDURE — G8427 DOCREV CUR MEDS BY ELIG CLIN: HCPCS | Performed by: FAMILY MEDICINE

## 2018-01-22 PROCEDURE — G8598 ASA/ANTIPLAT THER USED: HCPCS | Performed by: FAMILY MEDICINE

## 2018-01-22 PROCEDURE — 1036F TOBACCO NON-USER: CPT | Performed by: FAMILY MEDICINE

## 2018-01-22 PROCEDURE — 1123F ACP DISCUSS/DSCN MKR DOCD: CPT | Performed by: FAMILY MEDICINE

## 2018-01-22 PROCEDURE — 1111F DSCHRG MED/CURRENT MED MERGE: CPT | Performed by: FAMILY MEDICINE

## 2018-01-22 PROCEDURE — 3017F COLORECTAL CA SCREEN DOC REV: CPT | Performed by: FAMILY MEDICINE

## 2018-01-22 PROCEDURE — G8399 PT W/DXA RESULTS DOCUMENT: HCPCS | Performed by: FAMILY MEDICINE

## 2018-01-22 PROCEDURE — 1090F PRES/ABSN URINE INCON ASSESS: CPT | Performed by: FAMILY MEDICINE

## 2018-01-22 PROCEDURE — 4040F PNEUMOC VAC/ADMIN/RCVD: CPT | Performed by: FAMILY MEDICINE

## 2018-01-22 PROCEDURE — 99213 OFFICE O/P EST LOW 20 MIN: CPT | Performed by: FAMILY MEDICINE

## 2018-01-22 PROCEDURE — G8417 CALC BMI ABV UP PARAM F/U: HCPCS | Performed by: FAMILY MEDICINE

## 2018-01-22 PROCEDURE — 3014F SCREEN MAMMO DOC REV: CPT | Performed by: FAMILY MEDICINE

## 2018-01-22 NOTE — PROGRESS NOTES
Subjective:      Patient ID: Yoel Quinn is a 71 y.o. female. Patient presents with: Follow-Up from Hospital: Pneumonia 1-8-2018 [facility]    She was in the hospital with flu, atypical pneumonia (finished the med), chf and also a fib  No fever no abdomen pain no cp  No bm pb (little loose today)  Urine ok     meds noted and updated    YOB: 1945    Date of Visit:  1/22/2018     -- Morphine -- Rash    Current Outpatient Prescriptions:  lisinopril (PRINIVIL;ZESTRIL) 10 MG tablet, Take 1 tablet by mouth daily, Disp: 30 tablet, Rfl: 3  metoprolol succinate (TOPROL XL) 50 MG extended release tablet, Take 1 tablet by mouth daily, Disp: 30 tablet, Rfl: 3  furosemide (LASIX) 40 MG tablet, Take 1 tablet by mouth 2 times daily, Disp: 60 tablet, Rfl: 3  tiotropium (SPIRIVA RESPIMAT) 2.5 MCG/ACT AERS inhaler, Inhale 2 puffs into the lungs daily, Disp: 1 Inhaler, Rfl: 5  ALPRAZolam (XANAX) 0.25 MG tablet, Take 1 tablet by mouth nightly as needed for Sleep or Anxiety . , Disp: 30 tablet, Rfl: 0  XARELTO 20 MG TABS tablet, TAKE ONE TABLET BY MOUTH DAILY WITH BREAKFAST, Disp: 30 tablet, Rfl: 5  albuterol sulfate HFA (PROAIR HFA) 108 (90 Base) MCG/ACT inhaler, Inhale 2 puffs into the lungs every 4 hours as needed for Wheezing or Shortness of Breath, Disp: 1 Inhaler, Rfl: 5  albuterol (PROVENTIL) (2.5 MG/3ML) 0.083% nebulizer solution, Take 2.5 mg by nebulization 4 times daily, Disp: , Rfl:   rosuvastatin (CRESTOR) 20 MG tablet, Take 20 mg by mouth daily , Disp: , Rfl:   nitroGLYCERIN (NITROSTAT) 0.4 MG SL tablet, Place 1 tablet under the tongue every 5 minutes as needed for Chest pain, Disp: 25 tablet, Rfl: 1  isosorbide mononitrate (IMDUR) 30 MG CR tablet, Take 30 mg by mouth daily, Disp: , Rfl:   potassium chloride SA (K-DUR;KLOR-CON M) 10 MEQ tablet, Take 20 mEq by mouth daily , Disp: , Rfl:   Multiple Vitamins-Minerals (MULTI FOR HER 50+ PO), Take 1 tablet by mouth daily, Disp: , Rfl:     No current facility-administered medications for this visit.       ----------------------------------                   01/22/18                             1507            ----------------------------------   BP:              122/80            Site:           Left Arm           Position:        Sitting            Cuff Size:      Large Adult          Pulse:             72              Temp:      99.2 °F (37.3 °C)       TempSrc:          Oral             Weight: 215 lb 12.5 oz (97.9 kg)   Height:      5' 3\" (1.6 m)        ----------------------------------  Body mass index is 38.22 kg/m². Wt Readings from Last 3 Encounters:  01/22/18 : 215 lb 12.5 oz (97.9 kg)  01/17/18 : 212 lb (96.2 kg)  01/12/18 : 215 lb 9.8 oz (97.8 kg)    BP Readings from Last 3 Encounters:  01/22/18 : 122/80  01/17/18 : 124/62  01/12/18 : (!) 170/81            Review of Systems    Objective:   Physical Exam   Constitutional: She appears well-developed and well-nourished. No distress. Cardiovascular: Normal rate, regular rhythm and normal heart sounds. Exam reveals no gallop and no friction rub. No murmur heard. Pulmonary/Chest: Effort normal and breath sounds normal. No accessory muscle usage. No tachypnea. No respiratory distress. She has no decreased breath sounds. She has no wheezes. She has no rhonchi. She has no rales. Few ronchi at the bases   Lymphadenopathy:     She has no cervical adenopathy. Right: No supraclavicular adenopathy present. Left: No supraclavicular adenopathy present. Neurological: She is alert. Skin: Skin is warm, dry and intact. She is not diaphoretic. No pallor. Assessment:      1. Essential hypertension     2. Influenza A     3. Atypical pneumonia     4. Hospital discharge follow-up       Discussed and she will get the blood ordered by the cardiology doctor  She will be seeing dr Anahy Yoo soon as well.        Plan:      Do take the medications  Check the

## 2018-02-01 DIAGNOSIS — I48.0 PAF (PAROXYSMAL ATRIAL FIBRILLATION) (HCC): ICD-10-CM

## 2018-02-01 DIAGNOSIS — I50.33 ACUTE ON CHRONIC DIASTOLIC HF (HEART FAILURE) (HCC): ICD-10-CM

## 2018-02-01 DIAGNOSIS — I25.10 CORONARY ARTERY DISEASE INVOLVING NATIVE CORONARY ARTERY OF NATIVE HEART WITHOUT ANGINA PECTORIS: ICD-10-CM

## 2018-02-01 DIAGNOSIS — D72.829 LEUKOCYTOSIS, UNSPECIFIED TYPE: ICD-10-CM

## 2018-02-01 LAB
ANION GAP SERPL CALCULATED.3IONS-SCNC: 17 MMOL/L (ref 3–16)
BASOPHILS ABSOLUTE: 0.1 K/UL (ref 0–0.2)
BASOPHILS RELATIVE PERCENT: 1.1 %
BUN BLDV-MCNC: 24 MG/DL (ref 7–20)
CALCIUM SERPL-MCNC: 9.2 MG/DL (ref 8.3–10.6)
CHLORIDE BLD-SCNC: 95 MMOL/L (ref 99–110)
CO2: 23 MMOL/L (ref 21–32)
CREAT SERPL-MCNC: 1.4 MG/DL (ref 0.6–1.2)
EOSINOPHILS ABSOLUTE: 0.6 K/UL (ref 0–0.6)
EOSINOPHILS RELATIVE PERCENT: 6.2 %
GFR AFRICAN AMERICAN: 45
GFR NON-AFRICAN AMERICAN: 37
GLUCOSE BLD-MCNC: 102 MG/DL (ref 70–99)
HCT VFR BLD CALC: 34.9 % (ref 36–48)
HEMOGLOBIN: 11.2 G/DL (ref 12–16)
LYMPHOCYTES ABSOLUTE: 1.4 K/UL (ref 1–5.1)
LYMPHOCYTES RELATIVE PERCENT: 15 %
MCH RBC QN AUTO: 27.5 PG (ref 26–34)
MCHC RBC AUTO-ENTMCNC: 32 G/DL (ref 31–36)
MCV RBC AUTO: 86 FL (ref 80–100)
MONOCYTES ABSOLUTE: 0.7 K/UL (ref 0–1.3)
MONOCYTES RELATIVE PERCENT: 8 %
NEUTROPHILS ABSOLUTE: 6.5 K/UL (ref 1.7–7.7)
NEUTROPHILS RELATIVE PERCENT: 69.7 %
PDW BLD-RTO: 15.3 % (ref 12.4–15.4)
PLATELET # BLD: 393 K/UL (ref 135–450)
PMV BLD AUTO: 8.8 FL (ref 5–10.5)
POTASSIUM SERPL-SCNC: 4.4 MMOL/L (ref 3.5–5.1)
RBC # BLD: 4.06 M/UL (ref 4–5.2)
SODIUM BLD-SCNC: 135 MMOL/L (ref 136–145)
WBC # BLD: 9.4 K/UL (ref 4–11)

## 2018-02-13 ENCOUNTER — OFFICE VISIT (OUTPATIENT)
Dept: CARDIOLOGY CLINIC | Age: 73
End: 2018-02-13

## 2018-02-13 VITALS
SYSTOLIC BLOOD PRESSURE: 138 MMHG | WEIGHT: 208 LBS | OXYGEN SATURATION: 96 % | DIASTOLIC BLOOD PRESSURE: 80 MMHG | HEART RATE: 63 BPM | HEIGHT: 63 IN | BODY MASS INDEX: 36.86 KG/M2

## 2018-02-13 DIAGNOSIS — R77.8 ELEVATED TROPONIN: ICD-10-CM

## 2018-02-13 DIAGNOSIS — I48.0 PAF (PAROXYSMAL ATRIAL FIBRILLATION) (HCC): Primary | ICD-10-CM

## 2018-02-13 DIAGNOSIS — I25.10 CORONARY ARTERY DISEASE INVOLVING NATIVE CORONARY ARTERY OF NATIVE HEART WITHOUT ANGINA PECTORIS: ICD-10-CM

## 2018-02-13 DIAGNOSIS — I10 ESSENTIAL HYPERTENSION: ICD-10-CM

## 2018-02-13 DIAGNOSIS — I50.32 CHRONIC DIASTOLIC HF (HEART FAILURE) (HCC): ICD-10-CM

## 2018-02-13 DIAGNOSIS — I71.40 AAA (ABDOMINAL AORTIC ANEURYSM) WITHOUT RUPTURE: ICD-10-CM

## 2018-02-13 PROCEDURE — 3014F SCREEN MAMMO DOC REV: CPT | Performed by: NURSE PRACTITIONER

## 2018-02-13 PROCEDURE — G8399 PT W/DXA RESULTS DOCUMENT: HCPCS | Performed by: NURSE PRACTITIONER

## 2018-02-13 PROCEDURE — 99214 OFFICE O/P EST MOD 30 MIN: CPT | Performed by: NURSE PRACTITIONER

## 2018-02-13 PROCEDURE — G8417 CALC BMI ABV UP PARAM F/U: HCPCS | Performed by: NURSE PRACTITIONER

## 2018-02-13 PROCEDURE — G8427 DOCREV CUR MEDS BY ELIG CLIN: HCPCS | Performed by: NURSE PRACTITIONER

## 2018-02-13 PROCEDURE — 3017F COLORECTAL CA SCREEN DOC REV: CPT | Performed by: NURSE PRACTITIONER

## 2018-02-13 PROCEDURE — 1036F TOBACCO NON-USER: CPT | Performed by: NURSE PRACTITIONER

## 2018-02-13 PROCEDURE — G8598 ASA/ANTIPLAT THER USED: HCPCS | Performed by: NURSE PRACTITIONER

## 2018-02-13 PROCEDURE — 1123F ACP DISCUSS/DSCN MKR DOCD: CPT | Performed by: NURSE PRACTITIONER

## 2018-02-13 PROCEDURE — 93000 ELECTROCARDIOGRAM COMPLETE: CPT | Performed by: NURSE PRACTITIONER

## 2018-02-13 PROCEDURE — 1090F PRES/ABSN URINE INCON ASSESS: CPT | Performed by: NURSE PRACTITIONER

## 2018-02-13 PROCEDURE — 4040F PNEUMOC VAC/ADMIN/RCVD: CPT | Performed by: NURSE PRACTITIONER

## 2018-02-13 PROCEDURE — G8484 FLU IMMUNIZE NO ADMIN: HCPCS | Performed by: NURSE PRACTITIONER

## 2018-02-13 NOTE — LETTER
-no aldactone secondary to elevated Cr  -low sodium diet and fluid restriction    5. Essential hypertension  -controlled  -continue medical management    6. AAA (abdominal aortic aneurysm) without rupture ([facility] [address]  -follows vascular surgery  -last US on 8/2017    7. Sleep apnea  -intolerant to CPAP    Plan:    Continue lasix, lisinopril, Toprol, Xarelto, imdur and crestor  Labs from 2/1/2018 reviewed  Lexiscan-Myoview to R/O ischemia given recent CHF and elevated troponin and known CAD  Follow up in office TBD pending stress test results    Return for TBD. Thanks for allowing me to participate in the care of this patient. If you have questions, please do not hesitate to call me. I look forward to following Jagdeep Alonzo along with you.     Sincerely,        ANITA Swanson, CNP

## 2018-02-13 NOTE — PATIENT INSTRUCTIONS
dried soups, broths, and bouillon, unless labeled sodium-free or low-sodium. ¨ Canned vegetables, unless labeled sodium-free or low-sodium. ¨ [place] fries, pizza, tacos, and other fast foods. ¨ Pickles, olives, ketchup, and other condiments, especially soy sauce, unless labeled sodium-free or low-sodium. Where can you learn more? Go to https://GeoPagepeDieDe Die Development.Wrnch. org and sign in to your [employer] Path account. Enter X903 in the [facility] box to learn more about \"Low Sodium Diet (2,000 Milligram): Care Instructions. \"     If you do not have an account, please click on the \"Sign Up Now\" link. Current as of: May 12, 2017  Content Version: 11.5  © 7263-1845 [employer]. Care instructions adapted under license by [facility] ([facility]). If you have questions about a medical condition or this instruction, always ask your healthcare professional. [place] [address] any warranty or liability for your use of this information. Patient Education        How to Read a Food Label to Limit Sodium: Care Instructions  Your Care Instructions  Sodium causes your body to hold on to extra water. This can raise your blood pressure and force your heart and kidneys to work harder. In very serious cases, this could cause you to be put in the hospital. It might even be life-threatening. By limiting sodium, you will feel better and lower your risk of serious problems. Processed foods, fast food, and restaurant foods are the major sources of dietary sodium. The most common name for sodium is salt. Try to limit how much sodium you eat to less than 2,300 milligrams (mg) a day. If you limit your sodium to 1,500 mg a day, you can lower your blood pressure even more. This limit counts all the salt that you eat in foods you cook or in packaged foods. Keep a list of everything you eat and drink. Follow-up care is a key part of your treatment and safety.  Be sure to make and go to all appointments, and call your doctor if you are having problems. It's also a good idea to know your test results and keep a list of the medicines you take. How can you care for yourself at home? Read ingredient lists on food labels  · Read the list of ingredients on food labels to help you find how much sodium is in a food. The label lists the ingredients in a food in descending order (from the most to the least). If salt or sodium is high on the list, there may be a lot of sodium in the food. · Know that sodium has different names. Sodium is also called monosodium glutamate (MSG, common in [facility] food), sodium citrate, sodium alginate, sodium hydroxide, and sodium phosphate. Read Nutrition Facts labels  · On most foods, there is a Nutrition Facts label. This will tell you how much sodium is in one serving of food. Look at both the serving size and the sodium amount. The serving size is located at the top of the label, usually right under the \"Nutrition Facts\" title. The amount of sodium is given in the list under the title. It is given in milligrams (mg). ¨ Check the serving size carefully. A single serving is often very small, and you may eat more than one serving. If this is the case, you will eat more sodium than listed on the label. For example, if the serving size for a canned soup is 1 cup and the sodium amount is 470 mg, if you have 2 cups you will eat 940 mg of sodium. · The nutrition facts for fresh fruits and vegetables are not listed on the food. They may be listed somewhere in the store. These foods usually have no sodium or low sodium. · The Nutrition Facts label also gives you the Percent Daily Value for sodium. This is how much of the recommended amount of sodium a serving contains. The daily value for sodium is less than 2,300 mg. So if the Percent Daily Value says 50%, this means one serving is giving you half of this, or 1,150 mg. Buy low-sodium foods  · Look for foods that are made with less sodium.  Watch for the following words on the label. ¨ \"Unsalted\" means there is no sodium added to the food. But there may be sodium already in the food naturally. ¨ \"Sodium-free\" means a serving has less than 5 mg of sodium. ¨ \"Very low sodium\" means a serving has 35 mg or less of sodium. ¨ \"Low-sodium\" means a serving has 140 mg or less of sodium. · \"Reduced-sodium\" means that there is 25% less sodium than what the food normally has. This is still usually too much sodium. Try not to buy foods with this on the label. · Buy fresh vegetables, or frozen vegetables without added sauces. Buy low-sodium versions of canned vegetables, soups, and other canned goods. Where can you learn more? Go to https://BidThatProjecterikaCoAlign.Arch [employer]opartners. org and sign in to your [employer] account. Enter 26 [id number] in the [facility] box to learn more about \"How to Read a Food Label to Limit Sodium: Care Instructions. \"     If you do not have an account, please click on the \"Sign Up Now\" link. Current as of: May 12, 2017  Content Version: 11.5  © 5865-4751 [employer]. Care instructions adapted under license by [facility] ([facility]). If you have questions about a medical condition or this instruction, always ask your healthcare professional. [address] any warranty or liability for your use of this information.

## 2018-02-13 NOTE — PROGRESS NOTES
tongue every 5 minutes as needed for Chest pain 25 tablet 1    isosorbide mononitrate (IMDUR) 30 MG CR tablet Take 30 mg by mouth daily      potassium chloride SA (K-DUR;KLOR-CON M) 10 MEQ tablet Take 10 mEq by mouth daily       Multiple Vitamins-Minerals (MULTI FOR HER 50+ PO) Take 1 tablet by mouth daily       No current facility-administered medications for this visit. Physical Exam:   /80 (Site: Left Arm, Position: Sitting)   Pulse 63   Ht 5' 3\" (1.6 m)   Wt 208 lb (94.3 kg)   SpO2 96%   BMI 36.85 kg/m²   Wt Readings from Last 2 Encounters:   02/13/18 208 lb (94.3 kg)   01/22/18 215 lb 12.5 oz (97.9 kg)     Constitutional: She is oriented to person, place, and time. She appears well-developed and well-nourished. In no acute distress. HEENT: Normocephalic and atraumatic. Sclerae anicteric. No xanthelasmas. EOM's intact. Neck: Neck supple. No JVD present. Carotids without bruits. No  thyromegaly present. Cardiovascular: RRR, normal S1 and S2; no murmur/gallop or rub  Pulmonary/Chest: Effort normal .  Lungs clear to auscultation. Chest wall nontender  Abdominal: soft, nontender, nondistended. + bowel sounds; no hepatomegaly  Extremities: No edema, cyanosis, or clubbing. Pulses are 2+ radial/DP/PT bilaterally. Cap refill brisk. Neurological: No focal deficit. Skin: Skin is warm and dry. Psychiatric: She has a normal mood and affect.  Her speech is normal and behavior is normal.     Lab Review:   Lab Results   Component Value Date    TRIG 139 11/07/2017    HDL 52 11/07/2017    HDL 38 09/09/2011    LDLCALC 119 11/07/2017    LDLDIRECT 111 09/06/2012    LABVLDL 28 11/07/2017     Lab Results   Component Value Date     02/01/2018    K 4.4 02/01/2018    K 3.3 01/12/2018    CL 95 02/01/2018    CO2 23 02/01/2018    BUN 24 02/01/2018    CREATININE 1.4 02/01/2018    GLUCOSE 102 02/01/2018    GLUCOSE 102 07/14/2016    CALCIUM 9.2 02/01/2018      Lab Results   Component Value Date    WBC 9.4 02/01/2018    HGB 11.2 (L) 02/01/2018    HCT 34.9 (L) 02/01/2018    MCV 86.0 02/01/2018     02/01/2018       ECG 2/13/2018: 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   Visually estimated ejection fraction of 65%.   Mitral annular calcification.   Mild left atrial dilation.   Mild aortic stenosis. (mean gradients [id number])   The systolic pulmonary artery pressure (SPAP) estimated at 30 mmHg  (estimated RA pressure of 8 mmHg included). [facility]   of RCA chronic LAD 10-20% RA 4 PA24/9 16 wedge 9 LVEDP markedly elevated 30    [facility]:  1. Estimated diameter reduction of the right internal carotid artery is  less than 50%. 2.  Estimated diameter reduction of the left internal carotid artery is  50-69%. 3.  The bilateral common carotid arteries reveal no evidence of   significant stenosis. 4.  There is greater than 50% stenosis of the right external carotid  artery. 5.  There is no evidence of significant stenosis in the left external  carotid artery. 6.  The bilateral vertebral arteries are patent with antegrade flow. 7.  The bilateral subclavian arteries reveal no evidence of significant  stenosis. 8.  There has been no significant change from the previous study of  4/21/2017.     Assessment:    1. PAF (paroxysmal atrial fibrillation) (HCC)  -maintaining SR  -continue BB  -on long term anticoagulation with Xarelto  -CHADS VAsc score 7 for age, gender, DVT, CHF, HTN, CAD     2. Coronary artery disease involving native coronary artery of native heart without angina pectoris  -known  of RCA; nonobstructive disease in LAD and circ from prior cath  -continue medical management with BB, statin, nitrate  -risk factor modification    3.  Elevated troponin  -recently noted on hospitalization  -with known CAD will ask for stress test to evaluate ischemia  -recently held d/t infection (has completed her antibiotic

## 2018-02-14 ENCOUNTER — SCHEDULED TELEPHONE ENCOUNTER (OUTPATIENT)
Dept: PHARMACY | Facility: CLINIC | Age: 73
End: 2018-02-14

## 2018-02-14 ENCOUNTER — TELEPHONE (OUTPATIENT)
Dept: FAMILY MEDICINE CLINIC | Age: 73
End: 2018-02-14

## 2018-02-14 DIAGNOSIS — F41.9 ANXIETY: Primary | ICD-10-CM

## 2018-02-14 RX ORDER — ALPRAZOLAM 0.25 MG/1
0.25 TABLET ORAL NIGHTLY PRN
Qty: 30 TABLET | Refills: 0 | Status: SHIPPED | OUTPATIENT
Start: 2018-02-14 | End: 2018-03-16

## 2018-02-14 NOTE — COMMUNICATION BODY
HPI:  The patient is 68 y.o. female with a past medical history significant for CAD, atrial fib, HTN, aortic aneurysm and COPD who is here for follow up. Admitted 1/7/2018-1/12/2018  With SOB and chest pain and dx with acute on chronic diastolic HF (diuresed), and atrial fib. Last seen in office on 1/17/2018 and no med changes were made. Stress testing planned once she is off antibiotics. Overall feeling okay but continues to have SOB with stairs or increased pace of activity. Has issues with insomnia. Has known DENISE and can't tolerate CPAP. Wt at home 207#. Denies chest pain/discomfort, orthopnea/PND, cough, palpitations, dizziness, syncope, edema , weight change or claudication. Monitoring sodium and fluid intake. Assessment:    1. PAF (paroxysmal atrial fibrillation) ([facility])  -maintaining SR  -continue BB  -on long term anticoagulation with Xarelto  -CHADS VAsc score 7 for age, gender, DVT, CHF, HTN, CAD     2. Coronary artery disease involving native coronary artery of native heart without angina pectoris  -known  of RCA; nonobstructive disease in LAD and circ from prior cath  -continue medical management with BB, statin, nitrate  -risk factor modification    3. Elevated troponin  -recently noted on hospitalization  -with known CAD will ask for stress test to evaluate ischemia  -recently held d/t infection (has completed her antibiotic therapy)    4. Chronic diastolic HF (heart failure) (HCC)  -compensated; NYHA class III  -continue lasix, ACE-I and BB  -no aldactone secondary to elevated Cr  -low sodium diet and fluid restriction    5. Essential hypertension  -controlled  -continue medical management    6. AAA (abdominal aortic aneurysm) without rupture ([facility] [address]  -follows vascular surgery  -last US on 8/2017    7.  Sleep apnea  -intolerant to CPAP    Plan:    Continue lasix, lisinopril, Toprol, Xarelto, imdur and crestor  Labs from 2/1/2018 reviewed  Lexiscan-Myoview to R/O ischemia given recent CHF and

## 2018-02-14 NOTE — TELEPHONE ENCOUNTER
Pt requesting refill on ALPRAZolam Loretta Rodriguez) 0.25 MG tablet  To  [address], [address] - P [phone] - F [phone]      Please advise, [phone]

## 2018-02-14 NOTE — TELEPHONE ENCOUNTER
CLINICAL PHARMACY NOTE - Medication Review  Patient outreach to review medications - Spoke with patient. SUBJECTIVE/OBJECTIVE:   Enedina Fajardo is a 68 y.o. female referred to a clinical pharmacy specialist given their history of COPD and number of home medications. Medications:  Medication Sig    lisinopril (PRINIVIL;ZESTRIL) 10 MG tablet Take 1 tablet by mouth daily    metoprolol succinate (TOPROL XL) 50 MG extended release tablet Take 1 tablet by mouth daily    furosemide (LASIX) 40 MG tablet Take 1 tablet by mouth 2 times daily (Patient taking differently: Take 60 mg by mouth daily )  - 1 tab in morning, 1/2 in PM - see 2/1/2018 result note  - weighing daily  - aware of weights and when to call provider    tiotropium (SPIRIVA RESPIMAT) 2.5 MCG/ACT AERS inhaler Inhale 2 puffs into the lungs daily  - confirms scheduled    ALPRAZolam (XANAX) 0.25 MG tablet Take 1 tablet by mouth nightly as needed for Sleep or Anxiety .   - using 1 to 2 times weekly    XARELTO 20 MG TABS tablet TAKE ONE TABLET BY MOUTH DAILY WITH BREAKFAST  - h/o PE    albuterol sulfate HFA (PROAIR HFA) 108 (90 Base) MCG/ACT inhaler Inhale 2 puffs into the lungs every 4 hours as needed for Wheezing or Shortness of Breath  - has but has not needed    albuterol (PROVENTIL) (2.5 MG/3ML) 0.083% nebulizer solution Take 2.5 mg by nebulization 4 times daily    rosuvastatin (CRESTOR) 20 MG tablet Take 20 mg by mouth daily     nitroGLYCERIN (NITROSTAT) 0.4 MG SL tablet Place 1 tablet under the tongue every 5 minutes as needed for Chest pain    isosorbide mononitrate (IMDUR) 30 MG CR tablet Take 30 mg by mouth daily  - has not been taking  - does have at home  - says has not taken for a few weeks, but will start taking    potassium chloride SA (K-DUR;KLOR-CON M) 10 MEQ tablet Take 10 mEq by mouth daily   - 20 mEq tab, does 1/2 tablet daily  - Dr. Wilfrid Watt (lung doctor)    Multiple Vitamins-Minerals (MULTI FOR HER 50+ PO) Take 1 tablet by mouth daily     Additional Medications (including OTC/Herbal Supplements):  · Cyclobenzaprine - 2 tabs remaining, has not regularly needed (last fill 2015 per reconcile dispense?)  · Nifedipine - has not been taking but has - advised not to take    Allergies: Allergies   Allergen Reactions    Morphine Rash       Pertinent Labs/Vitals:    Lab Results   Component Value Date    K 4.4 02/01/2018      Lab Results   Component Value Date    CREATININE 1.4 (H) 02/01/2018      CrCl cannot be calculated (Unknown ideal weight.).      Ht Readings from Last 3 Encounters:   02/13/18 5' 3\" (1.6 m)   01/22/18 5' 3\" (1.6 m)   01/17/18 5' 3\" (1.6 m)     Wt Readings from Last 3 Encounters:   02/13/18 208 lb (94.3 kg)   01/22/18 215 lb 12.5 oz (97.9 kg)   01/17/18 212 lb (96.2 kg)     Estimated Renal Function:  CrCL 40 mL/min (calculated using sCr 1.4 mg/dL, Ht 63\", AdjBW 69.2 kg, using C-G equation)     Component      Latest Ref Rng & Units 2/1/2018          12:30 PM   GFR Non-      >60 37 (A)       BP Readings from Last 3 Encounters:   02/13/18 138/80   01/22/18 122/80   01/17/18 124/62     Pulse Readings from Last 3 Encounters:   02/13/18 63   01/22/18 72   01/17/18 57     Component      Latest Ref Rng & Units 11/7/2017           2:15 PM   Cholesterol, Total      0 - 199 mg/dL 199   Triglycerides      0 - 150 mg/dL 139   HDL Cholesterol      40 - 60 mg/dL 52   LDL Calculated      <100 mg/dL 119 (H)   VLDL Cholesterol Calculated      Not Established mg/dL 28     Lab Results   Component Value Date    ALT 47 01/07/2018        Tobacco History:   History   Smoking Status    Former Smoker    Packs/day: 1.00    Years: 37.00    Types: Cigarettes    Start date: 12/2/1976    Quit date: 9/17/2013   Smokeless Tobacco    Never Used     Immunizations:   Most Recent Immunizations   Administered Date(s) Administered    Pneumococcal 13-valent Conjugate (Uarprpt18) 02/16/2017    Pneumococcal Polysaccharide ([id number]) 06/30/2015     States had flu shot this year at [place] [address], says had about 2 weeks ago - updated record with pt reported historical immunization. Spirometry/PFT results:  7/21/2015  FEV1 41% predicted    ASSESSMENT/PLAN:   - Hypertension: Is at BP target of less than 150/90 mmHg. - Lipids: Patient has a history of ASCVD and is therefore a candidate for high-intensity statin therapy based on updated guidelines. Is on high intensity statin, appropriate.  - Renal Dosing: According to Code71, the following should have renal adjustment:   · Xarelto: CrCl ? 30 mL/minute: No dosage adjustment necessary. The Beers Criteria recommends to reduce the dose in older adults ? 65 years with a CrCl between 30 to 50 mL/minute (specific dosage adjustment not provided) (Beers Criteria [AGS 2015]). Edu to monitor for s/sx bleeding, continue to assess risk vs benefit of dosing.  - Non-formulary or medications with cost-effective alternatives: none reported/identified.  - Medication Interactions: No clinically significant interactions identified via Code71 Interaction Analysis as category D or higher.  - Other Clinical Pharmacy Opportunities Assessed:   - Primary Care 6 gaps-   · breast cancer screening (50-76yo with mammogram within 27 months of end of current year) - appears due, appt note written  - COPD:  · Inhaler technique/medication adherence: reports scheduled use of Spiriva Respimat  · Medication Cost: denies issues  · Spirometry on file: yes  · Pneumonia vaccine up to date: yes  · Smoking status: non-smoker  · Referral: consider pulmonary referral if FEV1< 50% predicted - is followed by pulmonary     No further recommendations at this time; closing encounter without routing.       Florentino Klein, PharmD, [facility] Pharmacist  Direct: [phone]  Dept: [phone] (toll free [phone], option 7)     CLINICAL PHARMACY CONSULT: MED RECONCILIATION/REVIEW [address].

## 2018-02-16 ENCOUNTER — PROCEDURE VISIT (OUTPATIENT)
Dept: SURGERY | Age: 73
End: 2018-02-16

## 2018-02-16 ENCOUNTER — OFFICE VISIT (OUTPATIENT)
Dept: SURGERY | Age: 73
End: 2018-02-16

## 2018-02-16 VITALS
BODY MASS INDEX: 36.14 KG/M2 | WEIGHT: 204 LBS | SYSTOLIC BLOOD PRESSURE: 166 MMHG | HEIGHT: 63 IN | DIASTOLIC BLOOD PRESSURE: 92 MMHG

## 2018-02-16 DIAGNOSIS — E78.2 MIXED HYPERLIPIDEMIA: Chronic | ICD-10-CM

## 2018-02-16 DIAGNOSIS — Z86.718 HISTORY OF DVT (DEEP VEIN THROMBOSIS): Chronic | ICD-10-CM

## 2018-02-16 DIAGNOSIS — I71.40 AAA (ABDOMINAL AORTIC ANEURYSM) WITHOUT RUPTURE: Primary | Chronic | ICD-10-CM

## 2018-02-16 DIAGNOSIS — I10 ESSENTIAL HYPERTENSION: Chronic | ICD-10-CM

## 2018-02-16 DIAGNOSIS — I48.91 ATRIAL FIBRILLATION WITH RVR (HCC): ICD-10-CM

## 2018-02-16 PROCEDURE — 99214 OFFICE O/P EST MOD 30 MIN: CPT | Performed by: SURGERY

## 2018-02-16 PROCEDURE — G8427 DOCREV CUR MEDS BY ELIG CLIN: HCPCS | Performed by: SURGERY

## 2018-02-16 PROCEDURE — 93978 VASCULAR STUDY: CPT | Performed by: SURGERY

## 2018-02-16 PROCEDURE — 1036F TOBACCO NON-USER: CPT | Performed by: SURGERY

## 2018-02-16 PROCEDURE — 3017F COLORECTAL CA SCREEN DOC REV: CPT | Performed by: SURGERY

## 2018-02-16 PROCEDURE — 4040F PNEUMOC VAC/ADMIN/RCVD: CPT | Performed by: SURGERY

## 2018-02-16 PROCEDURE — 3014F SCREEN MAMMO DOC REV: CPT | Performed by: SURGERY

## 2018-02-16 PROCEDURE — G8598 ASA/ANTIPLAT THER USED: HCPCS | Performed by: SURGERY

## 2018-02-16 PROCEDURE — 1123F ACP DISCUSS/DSCN MKR DOCD: CPT | Performed by: SURGERY

## 2018-02-16 PROCEDURE — 1090F PRES/ABSN URINE INCON ASSESS: CPT | Performed by: SURGERY

## 2018-02-16 PROCEDURE — G8484 FLU IMMUNIZE NO ADMIN: HCPCS | Performed by: SURGERY

## 2018-02-16 PROCEDURE — G8417 CALC BMI ABV UP PARAM F/U: HCPCS | Performed by: SURGERY

## 2018-02-16 PROCEDURE — G8399 PT W/DXA RESULTS DOCUMENT: HCPCS | Performed by: SURGERY

## 2018-02-16 ASSESSMENT — ENCOUNTER SYMPTOMS
ALLERGIC/IMMUNOLOGIC NEGATIVE: 1
GASTROINTESTINAL NEGATIVE: 1
RESPIRATORY NEGATIVE: 1

## 2018-02-16 NOTE — PROGRESS NOTES
insuff. Return for Proceed to have the CT abdomen performed. I Mallorie Baldwin MA am scribing for and in the presence of Juan Carlos Reyes MD on this date of 02/16/18     I Corina Bello MD personally performed the services described in this documentation as scribed by the Medical Assistant Mallorie Baldwin  in my presence and it is both accurate and complete.           Electronically signed by Juan Carlos Reyes MD on 2/16/2018 at 4:21 PM

## 2018-02-19 ENCOUNTER — HOSPITAL ENCOUNTER (OUTPATIENT)
Dept: NON INVASIVE DIAGNOSTICS | Age: 73
Discharge: HOME | End: 2018-02-20

## 2018-02-19 ENCOUNTER — HOSPITAL ENCOUNTER (OUTPATIENT)
Dept: NUCLEAR MEDICINE | Age: 73
Discharge: HOME | End: 2018-02-19
Attending: NURSE PRACTITIONER | Admitting: NURSE PRACTITIONER

## 2018-02-19 DIAGNOSIS — I25.10 ATHEROSCLEROTIC HEART DISEASE OF NATIVE CORONARY ARTERY WITHOUT ANGINA PECTORIS: ICD-10-CM

## 2018-02-19 LAB
LV EF: 72 %
LVEF MODALITY: NORMAL

## 2018-02-21 ENCOUNTER — TELEPHONE (OUTPATIENT)
Dept: CARDIOLOGY CLINIC | Age: 73
End: 2018-02-21

## 2018-02-22 DIAGNOSIS — R94.39 ABNORMAL CARDIOVASCULAR STRESS TEST: Primary | ICD-10-CM

## 2018-02-22 DIAGNOSIS — R77.8 ELEVATED TROPONIN: ICD-10-CM

## 2018-02-22 DIAGNOSIS — I25.10 CAD IN NATIVE ARTERY: ICD-10-CM

## 2018-02-22 DIAGNOSIS — I48.0 PAF (PAROXYSMAL ATRIAL FIBRILLATION) (HCC): ICD-10-CM

## 2018-02-23 ENCOUNTER — HOSPITAL ENCOUNTER (OUTPATIENT)
Dept: CT IMAGING | Age: 73
Discharge: HOME | End: 2018-02-23
Attending: SURGERY | Admitting: SURGERY

## 2018-02-23 ENCOUNTER — TELEPHONE (OUTPATIENT)
Dept: CARDIOLOGY CLINIC | Age: 73
End: 2018-02-23

## 2018-02-23 DIAGNOSIS — I25.10 ATHEROSCLEROTIC HEART DISEASE OF NATIVE CORONARY ARTERY WITHOUT ANGINA PECTORIS: ICD-10-CM

## 2018-02-23 DIAGNOSIS — I71.40 AAA (ABDOMINAL AORTIC ANEURYSM) WITHOUT RUPTURE: Chronic | ICD-10-CM

## 2018-02-24 DIAGNOSIS — I48.0 PAF (PAROXYSMAL ATRIAL FIBRILLATION) (HCC): ICD-10-CM

## 2018-02-24 DIAGNOSIS — I25.10 CAD IN NATIVE ARTERY: ICD-10-CM

## 2018-02-24 DIAGNOSIS — R94.39 ABNORMAL CARDIOVASCULAR STRESS TEST: ICD-10-CM

## 2018-02-24 LAB
ANION GAP SERPL CALCULATED.3IONS-SCNC: 14 MMOL/L (ref 3–16)
BUN BLDV-MCNC: 24 MG/DL (ref 7–20)
CALCIUM SERPL-MCNC: 9 MG/DL (ref 8.3–10.6)
CHLORIDE BLD-SCNC: 99 MMOL/L (ref 99–110)
CO2: 29 MMOL/L (ref 21–32)
CREAT SERPL-MCNC: 1.3 MG/DL (ref 0.6–1.2)
GFR AFRICAN AMERICAN: 49
GFR NON-AFRICAN AMERICAN: 40
GLUCOSE BLD-MCNC: 113 MG/DL (ref 70–99)
HCT VFR BLD CALC: 37.4 % (ref 36–48)
HEMOGLOBIN: 12.2 G/DL (ref 12–16)
INR BLD: 1.04 (ref 0.85–1.15)
MCH RBC QN AUTO: 28.2 PG (ref 26–34)
MCHC RBC AUTO-ENTMCNC: 32.7 G/DL (ref 31–36)
MCV RBC AUTO: 86.3 FL (ref 80–100)
PDW BLD-RTO: 16.4 % (ref 12.4–15.4)
PLATELET # BLD: 210 K/UL (ref 135–450)
PMV BLD AUTO: 10.5 FL (ref 5–10.5)
POTASSIUM SERPL-SCNC: 4.3 MMOL/L (ref 3.5–5.1)
PROTHROMBIN TIME: 11.7 SEC (ref 9.6–13)
RBC # BLD: 4.34 M/UL (ref 4–5.2)
SODIUM BLD-SCNC: 142 MMOL/L (ref 136–145)
WBC # BLD: 7.9 K/UL (ref 4–11)

## 2018-03-02 ENCOUNTER — HOSPITAL ENCOUNTER (OUTPATIENT)
Dept: CARDIAC CATH/INVASIVE PROCEDURES | Age: 73
Discharge: HOME | End: 2018-03-21
Attending: INTERNAL MEDICINE | Admitting: INTERNAL MEDICINE

## 2018-03-02 VITALS — HEIGHT: 63 IN | WEIGHT: 208 LBS | BODY MASS INDEX: 36.86 KG/M2

## 2018-03-02 PROCEDURE — 93567 NJX CAR CTH SPRVLV AORTGRPHY: CPT | Performed by: INTERNAL MEDICINE

## 2018-03-02 PROCEDURE — 99152 MOD SED SAME PHYS/QHP 5/>YRS: CPT | Performed by: INTERNAL MEDICINE

## 2018-03-02 PROCEDURE — 93458 L HRT ARTERY/VENTRICLE ANGIO: CPT | Performed by: INTERNAL MEDICINE

## 2018-03-02 RX ORDER — ASPIRIN 325 MG
325 TABLET ORAL ONCE
Status: DISCONTINUED | OUTPATIENT
Start: 2018-03-02 | End: 2018-03-02 | Stop reason: ALTCHOICE

## 2018-03-02 RX ORDER — ONDANSETRON 2 MG/ML
4 INJECTION INTRAMUSCULAR; INTRAVENOUS EVERY 6 HOURS PRN
Status: DISCONTINUED | OUTPATIENT
Start: 2018-03-02 | End: 2018-03-21 | Stop reason: SDUPTHER

## 2018-03-02 RX ORDER — ACETAMINOPHEN 325 MG/1
650 TABLET ORAL EVERY 4 HOURS PRN
Status: DISCONTINUED | OUTPATIENT
Start: 2018-03-02 | End: 2018-03-21 | Stop reason: SDUPTHER

## 2018-03-02 RX ORDER — SODIUM CHLORIDE 0.9 % (FLUSH) 0.9 %
10 SYRINGE (ML) INJECTION PRN
Status: DISCONTINUED | OUTPATIENT
Start: 2018-03-02 | End: 2018-03-02 | Stop reason: ALTCHOICE

## 2018-03-02 RX ORDER — SODIUM CHLORIDE 0.9 % (FLUSH) 0.9 %
10 SYRINGE (ML) INJECTION PRN
Status: DISCONTINUED | OUTPATIENT
Start: 2018-03-02 | End: 2018-03-21 | Stop reason: SDUPTHER

## 2018-03-02 RX ORDER — SODIUM CHLORIDE 0.9 % (FLUSH) 0.9 %
10 SYRINGE (ML) INJECTION EVERY 12 HOURS SCHEDULED
Status: DISCONTINUED | OUTPATIENT
Start: 2018-03-02 | End: 2018-03-21 | Stop reason: SDUPTHER

## 2018-03-02 RX ORDER — SODIUM CHLORIDE 0.9 % (FLUSH) 0.9 %
10 SYRINGE (ML) INJECTION EVERY 12 HOURS SCHEDULED
Status: DISCONTINUED | OUTPATIENT
Start: 2018-03-02 | End: 2018-03-02 | Stop reason: ALTCHOICE

## 2018-03-02 RX ORDER — SODIUM CHLORIDE 9 MG/ML
INJECTION, SOLUTION INTRAVENOUS CONTINUOUS
Status: DISCONTINUED | OUTPATIENT
Start: 2018-03-02 | End: 2018-03-02 | Stop reason: ALTCHOICE

## 2018-03-02 NOTE — BRIEF OP NOTE
Brief Postoperative Notebnormal stress test    Post-operative Diagnosis: Same    Procedure: [facility]    Anesthesia: Moderate Sedation    Surgeons/Assistants: Jeanne Chew    Estimated Blood Loss: less than 50     Complications: None    Specimens: Was Not Obtained    Findings: RCA- 100% proximal stenosis                 LMT- patent                 LAD- 20-30% ostial stenosis                 Ramus- Patent                 Lcx- 30-40% proximal stenosis                 LV- Normal LVEF 55-60%                 Aorta- mildly enlarged  No AI            Full note dictated.                   Electronically signed by Henry Paulino MD on 3/2/2018 at 12:02 PM

## 2018-03-02 NOTE — ANESTHESIA PRE-OP
flush 0.9 % injection 10 mL  10 mL Intravenous PRN Dejuan Olsen MD        aspirin tablet 325 mg  325 mg Oral Once Steven Le MD               Pre-Sedation:    Pre-Sedation Documentation and Exam:  I have personally completed a history, physical exam & review of systems for this patient (see notes). Prior History of Anesthesia Complications:   none    Modified Mallampati:  I (soft palate, uvula, fauces, tonsillar pillars visible)    ASA Classification:  Class 3 - A patient with severe systemic disease that limits activity but is not incapacitating and Class 2 -- A normal healthy patient with mild systemic disease    Mode Scale: Activity:  2 - Able to move 4 extremities voluntarily on command  Respiration:  2 - Able to breathe deeply and cough freely  Circulation:  2 - BP+/- 20mmHg of normal  Consciousness:  2 - Fully awake  Oxygen Saturation (color):  2 - Able to maintain oxygen saturation >92% on room air    Sedation/Anesthesia Plan:  Guard the patient's safety and welfare. Minimize physical discomfort and pain. Minimize negative psychological responses to treatment by providing sedation and analgesia and maximize the potential amnesia. Patient to meet pre-procedure discharge plan.     Medication Planned:  midazolam intravenously, fentanyl intravenously and midazolam intravenously, fentanyl intravenously    Patient is an appropriate candidate for plan of sedation: yes      Electronically signed by Nain Schmidt MD on 3/2/2018 at 11:11 AM

## 2018-03-02 NOTE — H&P
HPI:  The patient is 68 y.o. female with a past medical history significant for CAD, atrial fib, HTN, aortic aneurysm and COPD who is here for follow up. Admitted 1/7/2018-1/12/2018  With SOB and chest pain and dx with acute on chronic diastolic HF (diuresed), and atrial fib. Last seen in office on 1/17/2018 and no med changes were made. Stress testing planned once she is off antibiotics.     Overall feeling okay but continues to have SOB with stairs or increased pace of activity. Has issues with insomnia. Has known DENISE and can't tolerate CPAP. Wt at home 207#. Denies chest pain/discomfort, orthopnea/PND, cough, palpitations, dizziness, syncope, edema , weight change or claudication. Monitoring sodium and fluid intake.      Review of Systems:  Constitutional: Denies falls, night sweats or fever. Fatigue improved. HEENT: Denies new visual changes, ringing in ears, nosebleeds, nasal congestion  Respiratory: + SOB (chronic but with improvement)  Cardiovascular: see HPI  GI: Denies N/V, diarrhea, constipation, abdominal pain, change in bowel habits, melena or hematochezia  : Denies urinary frequency, urgency, incontinence, hematuria or dysuria. Skin: Denies rash, hives, or cyanosis  Musculoskeletal: Denies joint or muscle aches/pain  Neurological: Denies syncope or TIA-like symptoms.   Psychiatric: Denies anxiety or depression  + insomnia     Past Medical History        Past Medical History:   Diagnosis Date    AAA (abdominal aortic aneurysm) ([facility] [address]       pt states it is 4cm    AAA (abdominal aortic aneurysm) without rupture (HCC) 2/10/2015    Atrial fibrillation (HCC)      CHF (congestive heart failure) (HCC)      History of blood clots      Hyperlipidemia      Hypertension           Past Surgical History         Past Surgical History:   Procedure Laterality Date    APPENDECTOMY   1990     incidental    BLADDER SUSPENSION        CATARACT REMOVAL        CHOLECYSTECTOMY   10/15/13    COLONOSCOPY   pain 25 tablet 1    isosorbide mononitrate (IMDUR) 30 MG CR tablet Take 30 mg by mouth daily        potassium chloride SA (K-DUR;KLOR-CON M) 10 MEQ tablet Take 10 mEq by mouth daily         Multiple Vitamins-Minerals (MULTI FOR HER 50+ PO) Take 1 tablet by mouth daily          No current facility-administered medications for this visit.             Physical Exam:   /80 (Site: Left Arm, Position: Sitting)   Pulse 63   Ht 5' 3\" (1.6 m)   Wt 208 lb (94.3 kg)   SpO2 96%   BMI 36.85 kg/m²       Wt Readings from Last 2 Encounters:   02/13/18 208 lb (94.3 kg)   01/22/18 215 lb 12.5 oz (97.9 kg)      Constitutional: She is oriented to person, place, and time. She appears well-developed and well-nourished. In no acute distress. HEENT: Normocephalic and atraumatic. Sclerae anicteric. No xanthelasmas. EOM's intact. Neck: Neck supple. No JVD present. Carotids without bruits. No  thyromegaly present. Cardiovascular: RRR, normal S1 and S2; no murmur/gallop or rub  Pulmonary/Chest: Effort normal .  Lungs clear to auscultation. Chest wall nontender  Abdominal: soft, nontender, nondistended. + bowel sounds; no hepatomegaly  Extremities: No edema, cyanosis, or clubbing. Pulses are 2+ radial/DP/PT bilaterally. Cap refill brisk. Neurological: No focal deficit. Skin: Skin is warm and dry. Psychiatric: She has a normal mood and affect.  Her speech is normal and behavior is normal.      Lab Review:         Lab Results   Component Value Date     TRIG 139 11/07/2017     HDL 52 11/07/2017     HDL 38 09/09/2011     LDLCALC 119 11/07/2017     LDLDIRECT 111 09/06/2012     LABVLDL 28 11/07/2017            Lab Results   Component Value Date      02/01/2018     K 4.4 02/01/2018     K 3.3 01/12/2018     CL 95 02/01/2018     CO2 23 02/01/2018     BUN 24 02/01/2018     CREATININE 1.4 02/01/2018     GLUCOSE 102 02/01/2018     GLUCOSE 102 07/14/2016     CALCIUM 9.2 02/01/2018            Lab Results   Component Value Date   WBC 9.4 02/01/2018     HGB 11.2 (L) 02/01/2018     HCT 34.9 (L) 02/01/2018     MCV 86.0 02/01/2018      02/01/2018         ECG 2/13/2018: Sinus rhythm with old anterior infarct     Echo 1/9/2018:  Mild concentric left ventricular hypertrophy.   Visually estimated ejection fraction of 65%.   Mitral annular calcification.   Mild left atrial dilation.   Mild aortic stenosis. (mean gradients 13RKGL)   The systolic pulmonary artery pressure (SPAP) estimated at 30 mmHg  (estimated RA pressure of 8 mmHg included).     [facility]   of RCA chronic LAD 10-20% RA 4 PA24/9 16 wedge 9 LVEDP markedly elevated 30     [facility]:  1. Estimated diameter reduction of the right internal carotid artery is  less than 50%. 2.  Estimated diameter reduction of the left internal carotid artery is  50-69%. 3.  The bilateral common carotid arteries reveal no evidence of   significant stenosis. 4.  There is greater than 50% stenosis of the right external carotid  artery. 5.  There is no evidence of significant stenosis in the left external  carotid artery. 6.  The bilateral vertebral arteries are patent with antegrade flow. 7.  The bilateral subclavian arteries reveal no evidence of significant  stenosis. 8.  There has been no significant change from the previous study of  4/21/2017. Nuclear stress test  Summary    Abnormal study. There is a moderate sized, mild intensity, partially    reversible defect of the mid to apical anterior and mid anterolateral walls    which is consistent with ischemia. There is breast attenuation but stress    images appear worse.    Normal LV size and systolic function.    Overall findings represent an intermediate risk study.              Assessment/Plan:     1. PAF (paroxysmal atrial fibrillation) (HCC)  -maintaining SR  -continue BB  -on long term anticoagulation with Xarelto  -CHADS VAsc score 7 for age, gender, DVT, CHF, HTN, CAD      2.  Coronary artery disease involving native coronary artery of native heart without angina pectoris  -known  of RCA; nonobstructive disease in LAD and circ from prior cath  -Nuclear stress test is positive for ischemia     I discussed the risks and benefits of cardiac catheterization with the patient. I also discussed the possible therapies and alternatives including medical management, angioplasty and stenting or coronary bypass surgery. The patient is amenable to undergoing the procedure and voices understanding of the risks. We will have the procedure scheduled. 3. Elevated troponin  -recently noted on hospitalization  -with known CAD will ask for stress test to evaluate ischemia  -recently held d/t infection (has completed her antibiotic therapy)     4. Chronic diastolic HF (heart failure) (HCC)  -compensated; NYHA class III  -continue lasix, ACE-I and BB  -no aldactone secondary to elevated Cr  -low sodium diet and fluid restriction     5. Essential hypertension  -controlled  -continue medical management     6. AAA (abdominal aortic aneurysm) without rupture ([facility] [address]  -follows vascular surgery  -last US on 8/2017     7.  Sleep apnea  -intolerant to CPAP     Plan:  Return for TBD.      Thanks for allowing me to participate in the care of this patient.

## 2018-03-02 NOTE — PROCEDURES
[address] [place], [place] [address]                              CARDIAC CATHETERIZATION    PATIENT NAME: Char Islas                     :        1945  MED REC NO:   [id number]                          ROOM:  ACCOUNT NO:   [de-identified]                          ADMIT DATE: 2018  PROVIDER:     Keira Wolf MD    DATE OF PROCEDURE:  2018    PROCEDURE NOTE:  The patient was explained benefits and risks of the  procedure. Informed consent was obtained. Right radial artery was  initially punctured using Seldinger technique. Attempts to cannulate the  right renal artery were unsuccessful. Right groin was therefore prepared  in usual sterile fashion. Right femoral artery was punctured using  Seldinger technique. A 6-Turkmen sheath was placed in the right femoral  artery. There was initial difficulty advancing the J-wire to the  aneurysmal area but it was successfully negotiated with the help of a  Glidewire. Coronary angiography was subsequently carried out using  5-Turkmen JL4 and 5-Turkmen JR4 diagnostic catheters. Multiple views in  Kiswahili-SILVA projection with cranial and caudal angulation were obtained. Angled pigtail catheter was used for a left ventriculogram.  Pressure in  the left ventricular cavity was obtained before and after left  ventriculogram.  There was no gradient across the aortic valve noted. The  aortogram was subsequently obtained in Kiswahili projection. The femoral  angiogram was obtained at the end and hemostasis was achieved. Angio-Seal  was used for the closure and the patient was sent to the post cath area. HEMODYNAMIC DATA:  Please see the computerized printout. CORONARY ANGIOGRAPHY:  1. Right coronary artery. It derives from right sinus of Valsalva. It is  a moderate-sized artery. It is 100% occluded in the proximal segment and  the acute marginal branches are seen.   2.  The

## 2018-03-06 ENCOUNTER — TELEPHONE (OUTPATIENT)
Dept: SURGERY | Age: 73
End: 2018-03-06

## 2018-03-06 NOTE — TELEPHONE ENCOUNTER
Returned the call to the patient advising her to contact our office between the hours of 8 am - 5pm tomorrow to further discuss AAA repair surgery

## 2018-03-07 ENCOUNTER — TELEPHONE (OUTPATIENT)
Dept: CARDIOLOGY CLINIC | Age: 73
End: 2018-03-07

## 2018-03-07 NOTE — TELEPHONE ENCOUNTER
Returned the call to the patient, she has been provided a tentative date for Sx of 3/21/2018, she has been provided cardiac clearance per Dr. Alfonso Frank cardiac note on 3/5/2018, she will contact our office once she has been scheduled for the pre-op h&p with Dr. Darryl Almaraz (PCP)

## 2018-03-12 ENCOUNTER — OFFICE VISIT (OUTPATIENT)
Dept: FAMILY MEDICINE CLINIC | Age: 73
End: 2018-03-12

## 2018-03-12 VITALS
HEART RATE: 72 BPM | WEIGHT: 204 LBS | BODY MASS INDEX: 36.14 KG/M2 | DIASTOLIC BLOOD PRESSURE: 82 MMHG | TEMPERATURE: 97.6 F | SYSTOLIC BLOOD PRESSURE: 122 MMHG | HEIGHT: 63 IN

## 2018-03-12 DIAGNOSIS — Z01.818 PREOP EXAMINATION: Primary | ICD-10-CM

## 2018-03-12 DIAGNOSIS — I25.10 CORONARY ARTERY DISEASE INVOLVING NATIVE HEART WITHOUT ANGINA PECTORIS, UNSPECIFIED VESSEL OR LESION TYPE: Chronic | ICD-10-CM

## 2018-03-12 DIAGNOSIS — G47.33 OBSTRUCTIVE SLEEP APNEA SYNDROME: Chronic | ICD-10-CM

## 2018-03-12 DIAGNOSIS — Z01.818 PREOP EXAMINATION: ICD-10-CM

## 2018-03-12 DIAGNOSIS — I71.40 AAA (ABDOMINAL AORTIC ANEURYSM) WITHOUT RUPTURE: Chronic | ICD-10-CM

## 2018-03-12 DIAGNOSIS — I10 ESSENTIAL HYPERTENSION: Chronic | ICD-10-CM

## 2018-03-12 PROCEDURE — 1090F PRES/ABSN URINE INCON ASSESS: CPT | Performed by: FAMILY MEDICINE

## 2018-03-12 PROCEDURE — 99214 OFFICE O/P EST MOD 30 MIN: CPT | Performed by: FAMILY MEDICINE

## 2018-03-12 PROCEDURE — G8417 CALC BMI ABV UP PARAM F/U: HCPCS | Performed by: FAMILY MEDICINE

## 2018-03-12 PROCEDURE — G8598 ASA/ANTIPLAT THER USED: HCPCS | Performed by: FAMILY MEDICINE

## 2018-03-12 PROCEDURE — G8399 PT W/DXA RESULTS DOCUMENT: HCPCS | Performed by: FAMILY MEDICINE

## 2018-03-12 PROCEDURE — 3014F SCREEN MAMMO DOC REV: CPT | Performed by: FAMILY MEDICINE

## 2018-03-12 PROCEDURE — 1036F TOBACCO NON-USER: CPT | Performed by: FAMILY MEDICINE

## 2018-03-12 PROCEDURE — 4040F PNEUMOC VAC/ADMIN/RCVD: CPT | Performed by: FAMILY MEDICINE

## 2018-03-12 PROCEDURE — G8427 DOCREV CUR MEDS BY ELIG CLIN: HCPCS | Performed by: FAMILY MEDICINE

## 2018-03-12 PROCEDURE — G8484 FLU IMMUNIZE NO ADMIN: HCPCS | Performed by: FAMILY MEDICINE

## 2018-03-12 PROCEDURE — 1123F ACP DISCUSS/DSCN MKR DOCD: CPT | Performed by: FAMILY MEDICINE

## 2018-03-12 PROCEDURE — 3017F COLORECTAL CA SCREEN DOC REV: CPT | Performed by: FAMILY MEDICINE

## 2018-03-12 ASSESSMENT — ENCOUNTER SYMPTOMS
CHEST TIGHTNESS: 0
BLOOD IN STOOL: 0
SHORTNESS OF BREATH: 0
VOMITING: 0
SORE THROAT: 0
NAUSEA: 0
ABDOMINAL DISTENTION: 0
COUGH: 0
EYE PAIN: 0
VOICE CHANGE: 0
ABDOMINAL PAIN: 0
DIARRHEA: 0
CONSTIPATION: 0

## 2018-03-12 NOTE — PROGRESS NOTES
past      Plan:       No medications the morning of the surgery  Stop the xarelto blood thinner 4 days before the surgery  See back in 4 months

## 2018-03-13 LAB
ANION GAP SERPL CALCULATED.3IONS-SCNC: 20 MMOL/L (ref 3–16)
BUN BLDV-MCNC: 22 MG/DL (ref 7–20)
CALCIUM SERPL-MCNC: 9.6 MG/DL (ref 8.3–10.6)
CHLORIDE BLD-SCNC: 101 MMOL/L (ref 99–110)
CO2: 24 MMOL/L (ref 21–32)
CREAT SERPL-MCNC: 1.3 MG/DL (ref 0.6–1.2)
GFR AFRICAN AMERICAN: 49
GFR NON-AFRICAN AMERICAN: 40
GLUCOSE BLD-MCNC: 98 MG/DL (ref 70–99)
POTASSIUM SERPL-SCNC: 4.8 MMOL/L (ref 3.5–5.1)
SODIUM BLD-SCNC: 145 MMOL/L (ref 136–145)

## 2018-03-15 ENCOUNTER — TELEPHONE (OUTPATIENT)
Dept: SURGERY | Age: 73
End: 2018-03-15

## 2018-03-16 ENCOUNTER — TELEPHONE (OUTPATIENT)
Dept: FAMILY MEDICINE CLINIC | Age: 73
End: 2018-03-16

## 2018-03-16 NOTE — TELEPHONE ENCOUNTER
Telephone Outcome: Patient refused to schedule appt now but states she will call later when its closer to July.  (patient is due for appt in July)

## 2018-03-20 ENCOUNTER — PAT TELEPHONE (OUTPATIENT)
Dept: PREADMISSION TESTING | Age: 73
End: 2018-03-20

## 2018-03-20 NOTE — PROGRESS NOTES
wear simple loose fitting clothing to the hospital.  Please do not bring valuables. Do not wear any make-up or nail polish on your fingers or toes      For your safety, please do not wear any jewelry or body piercing's on the day of surgery. All jewelry must be removed. If you have dentures, they will be removed before going to operating room. For your convenience, we will provide you with a container. If you wear contact lenses or glasses, they will be removed, please bring a case for them. If you have a living will and a durable power of  for healthcare, please bring in a copy. As part of our patient safety program to minimize surgical site infections, we ask you to do the following:    · Please notify your surgeon if you develop any illness between         now and the  day of your surgery. · This includes a cough, cold, fever, sore throat, nausea,         or vomiting, and diarrhea, etc.  ·  Please notify your surgeon if you experience dizziness, shortness         of breath or blurred vision between now and the time of your surgery. Do not shave your operative site 96 hours prior to surgery. For face and neck surgery, men may use an electric razor 48 hours   prior to surgery. You may shower the night before surgery or the morning of   your surgery with an antibacterial soap. You will need to bring a photo ID and insurance card    [facility] has an onsite pharmacy, would you like to utilize our pharmacy     If you will be staying overnight and use a C-pap machine, please bring   your C-pap to hospital     Our goal is to provide you with excellent care, therefore, visitors will be limited to two(2) in the room at a time so that we may focus on providing this care for you. Please contact pre-admission testing if you have any further questions.                  [facility] phone number:  [phone] PAT fax number:  [phone]  Please note these are

## 2018-03-21 ENCOUNTER — TELEPHONE (OUTPATIENT)
Dept: SURGERY | Age: 73
End: 2018-03-21

## 2018-03-21 PROBLEM — I71.40 AAA (ABDOMINAL AORTIC ANEURYSM): Status: ACTIVE | Noted: 2018-03-21

## 2018-03-23 ENCOUNTER — CARE COORDINATION (OUTPATIENT)
Dept: CASE MANAGEMENT | Age: 73
End: 2018-03-23

## 2018-03-23 ENCOUNTER — TELEPHONE (OUTPATIENT)
Dept: FAMILY MEDICINE CLINIC | Age: 73
End: 2018-03-23

## 2018-03-23 DIAGNOSIS — F41.9 ANXIETY: Primary | ICD-10-CM

## 2018-03-23 DIAGNOSIS — I26.99 BILATERAL PULMONARY EMBOLISM (HCC): Primary | Chronic | ICD-10-CM

## 2018-03-23 PROCEDURE — 1111F DSCHRG MED/CURRENT MED MERGE: CPT | Performed by: FAMILY MEDICINE

## 2018-03-23 RX ORDER — ALPRAZOLAM 0.25 MG/1
0.25 TABLET ORAL NIGHTLY PRN
Qty: 30 TABLET | Refills: 0 | Status: SHIPPED | OUTPATIENT
Start: 2018-03-23 | End: 2018-06-14 | Stop reason: SDUPTHER

## 2018-03-26 ENCOUNTER — TELEPHONE (OUTPATIENT)
Dept: SURGERY | Age: 73
End: 2018-03-26

## 2018-03-27 ENCOUNTER — TELEPHONE (OUTPATIENT)
Dept: SURGERY | Age: 73
End: 2018-03-27

## 2018-03-27 RX ORDER — ROSUVASTATIN CALCIUM 20 MG/1
20 TABLET, COATED ORAL DAILY
Qty: 90 TABLET | Refills: 1 | Status: SHIPPED | OUTPATIENT
Start: 2018-03-27 | End: 2018-06-01 | Stop reason: SDUPTHER

## 2018-03-29 ENCOUNTER — CARE COORDINATION (OUTPATIENT)
Dept: CASE MANAGEMENT | Age: 73
End: 2018-03-29

## 2018-03-29 NOTE — CARE COORDINATION
[facility] Transitions Follow Up Call    3/29/2018    Patient: Maykel Sparrow  Patient : 1945   MRN: [id number]  Reason for Admission: There are no discharge diagnoses documented for the most recent discharge. Discharge Date: 3/22/18 RARS: Risk Score: 19.5       Spoke with: Robert Garcia (patient)    Care Transitions Subsequent and Final Call    Subsequent and Final Calls  Do you have any ongoing symptoms?:  No  Have your medications changed?:  No  Do you have any questions related to your medications?:  No  Do you currently have any active services?:  No  Do you have any needs or concerns that I can assist you with?:  No  Identified Barriers:  None  Care Transitions Interventions  Other Interventions: Follow Up: Spoke with Barrera Robles. She states she is doing pretty good. Barrera Rogerbenjamin denies any confusion, visual changes, or chest pain. She denies any SOB or wheezing. She states she is tired. Barrera Robles denies any weakness, H/A, or dizziness. Barrera Duranbenjamin is following up with Rian Woo and the cardiac M.D. - she states  will have to be \"on the back burner\". Barrera Robles states she saw the eye doctor for an eye infection. She is having bowel movements and urinating without difficulty. Barrera Robles feels her surgical site is intact and that what she thought was seepage she now believes to be from the irritation from the tape. Barrera Robles is tolerating food & fluid without difficulty. Barrera Robles states she has chronic edema in her hands and abdomen - it is at baseline. Her weight today = 204 lbs - which is down from 208 yesterday. Barrera Robles denies cough or sputum production. Barrera Robles denies any needs or concerns today. She is agreeable to follow up from [facility].   Future Appointments  Date Time Provider Kiana Puri   2018 1:45 PM [facility] [id number] Ila Canales RN

## 2018-04-02 ENCOUNTER — CARE COORDINATION (OUTPATIENT)
Dept: CASE MANAGEMENT | Age: 73
End: 2018-04-02

## 2018-04-03 ENCOUNTER — CARE COORDINATION (OUTPATIENT)
Dept: CASE MANAGEMENT | Age: 73
End: 2018-04-03

## 2018-04-06 ENCOUNTER — OFFICE VISIT (OUTPATIENT)
Dept: SURGERY | Age: 73
End: 2018-04-06

## 2018-04-06 VITALS
HEART RATE: 90 BPM | BODY MASS INDEX: 37.02 KG/M2 | DIASTOLIC BLOOD PRESSURE: 90 MMHG | WEIGHT: 209 LBS | SYSTOLIC BLOOD PRESSURE: 152 MMHG

## 2018-04-06 DIAGNOSIS — Z86.79 S/P AAA REPAIR: Primary | ICD-10-CM

## 2018-04-06 DIAGNOSIS — I71.40 ABDOMINAL AORTIC ANEURYSM (AAA) WITHOUT RUPTURE: ICD-10-CM

## 2018-04-06 DIAGNOSIS — Z98.890 S/P AAA REPAIR: Primary | ICD-10-CM

## 2018-04-06 PROCEDURE — 99024 POSTOP FOLLOW-UP VISIT: CPT | Performed by: SURGERY

## 2018-04-06 ASSESSMENT — ENCOUNTER SYMPTOMS
ALLERGIC/IMMUNOLOGIC NEGATIVE: 1
GASTROINTESTINAL NEGATIVE: 1
RESPIRATORY NEGATIVE: 1

## 2018-05-01 ENCOUNTER — OFFICE VISIT (OUTPATIENT)
Dept: CARDIOLOGY CLINIC | Age: 73
End: 2018-05-01

## 2018-05-01 VITALS
SYSTOLIC BLOOD PRESSURE: 136 MMHG | HEIGHT: 63 IN | WEIGHT: 212 LBS | BODY MASS INDEX: 37.56 KG/M2 | OXYGEN SATURATION: 97 % | DIASTOLIC BLOOD PRESSURE: 76 MMHG | HEART RATE: 56 BPM

## 2018-05-01 DIAGNOSIS — G47.33 OBSTRUCTIVE SLEEP APNEA SYNDROME: ICD-10-CM

## 2018-05-01 DIAGNOSIS — I25.10 CORONARY ARTERY DISEASE INVOLVING NATIVE HEART WITHOUT ANGINA PECTORIS, UNSPECIFIED VESSEL OR LESION TYPE: Primary | Chronic | ICD-10-CM

## 2018-05-01 DIAGNOSIS — I10 ESSENTIAL HYPERTENSION: ICD-10-CM

## 2018-05-01 DIAGNOSIS — I71.40 AAA (ABDOMINAL AORTIC ANEURYSM) WITHOUT RUPTURE: Chronic | ICD-10-CM

## 2018-05-01 DIAGNOSIS — I48.0 PAF (PAROXYSMAL ATRIAL FIBRILLATION) (HCC): ICD-10-CM

## 2018-05-01 DIAGNOSIS — E78.2 MIXED HYPERLIPIDEMIA: ICD-10-CM

## 2018-05-01 DIAGNOSIS — I50.32 CHRONIC DIASTOLIC HEART FAILURE (HCC): ICD-10-CM

## 2018-05-01 PROCEDURE — 93000 ELECTROCARDIOGRAM COMPLETE: CPT | Performed by: INTERNAL MEDICINE

## 2018-05-01 PROCEDURE — 3017F COLORECTAL CA SCREEN DOC REV: CPT | Performed by: INTERNAL MEDICINE

## 2018-05-01 PROCEDURE — 99214 OFFICE O/P EST MOD 30 MIN: CPT | Performed by: INTERNAL MEDICINE

## 2018-05-01 PROCEDURE — G8399 PT W/DXA RESULTS DOCUMENT: HCPCS | Performed by: INTERNAL MEDICINE

## 2018-05-01 PROCEDURE — G8417 CALC BMI ABV UP PARAM F/U: HCPCS | Performed by: INTERNAL MEDICINE

## 2018-05-01 PROCEDURE — G8427 DOCREV CUR MEDS BY ELIG CLIN: HCPCS | Performed by: INTERNAL MEDICINE

## 2018-05-01 PROCEDURE — 1123F ACP DISCUSS/DSCN MKR DOCD: CPT | Performed by: INTERNAL MEDICINE

## 2018-05-01 PROCEDURE — 1090F PRES/ABSN URINE INCON ASSESS: CPT | Performed by: INTERNAL MEDICINE

## 2018-05-01 PROCEDURE — 1036F TOBACCO NON-USER: CPT | Performed by: INTERNAL MEDICINE

## 2018-05-01 PROCEDURE — G8598 ASA/ANTIPLAT THER USED: HCPCS | Performed by: INTERNAL MEDICINE

## 2018-05-01 PROCEDURE — 4040F PNEUMOC VAC/ADMIN/RCVD: CPT | Performed by: INTERNAL MEDICINE

## 2018-05-31 DIAGNOSIS — I10 ESSENTIAL HYPERTENSION: ICD-10-CM

## 2018-05-31 DIAGNOSIS — E78.2 MIXED HYPERLIPIDEMIA: ICD-10-CM

## 2018-05-31 LAB
A/G RATIO: 1.7 (ref 1.1–2.2)
ALBUMIN SERPL-MCNC: 4.2 G/DL (ref 3.4–5)
ALP BLD-CCNC: 82 U/L (ref 40–129)
ALT SERPL-CCNC: 11 U/L (ref 10–40)
ANION GAP SERPL CALCULATED.3IONS-SCNC: 15 MMOL/L (ref 3–16)
AST SERPL-CCNC: 15 U/L (ref 15–37)
BILIRUB SERPL-MCNC: <0.2 MG/DL (ref 0–1)
BUN BLDV-MCNC: 25 MG/DL (ref 7–20)
CALCIUM SERPL-MCNC: 9.5 MG/DL (ref 8.3–10.6)
CHLORIDE BLD-SCNC: 101 MMOL/L (ref 99–110)
CHOLESTEROL, TOTAL: 173 MG/DL (ref 0–199)
CO2: 27 MMOL/L (ref 21–32)
CREAT SERPL-MCNC: 1.4 MG/DL (ref 0.6–1.2)
GFR AFRICAN AMERICAN: 45
GFR NON-AFRICAN AMERICAN: 37
GLOBULIN: 2.5 G/DL
GLUCOSE BLD-MCNC: 91 MG/DL (ref 70–99)
HDLC SERPL-MCNC: 37 MG/DL (ref 40–60)
LDL CHOLESTEROL CALCULATED: 100 MG/DL
POTASSIUM SERPL-SCNC: 5 MMOL/L (ref 3.5–5.1)
SODIUM BLD-SCNC: 143 MMOL/L (ref 136–145)
TOTAL PROTEIN: 6.7 G/DL (ref 6.4–8.2)
TRIGL SERPL-MCNC: 181 MG/DL (ref 0–150)
VLDLC SERPL CALC-MCNC: 36 MG/DL

## 2018-06-01 DIAGNOSIS — E78.2 MIXED HYPERLIPIDEMIA: Primary | ICD-10-CM

## 2018-06-01 RX ORDER — ROSUVASTATIN CALCIUM 40 MG/1
40 TABLET, COATED ORAL DAILY
Qty: 90 TABLET | Refills: 3
Start: 2018-06-01 | End: 2020-08-04

## 2018-06-01 RX ORDER — ISOSORBIDE MONONITRATE 30 MG/1
30 TABLET, EXTENDED RELEASE ORAL DAILY
Qty: 30 TABLET | Refills: 5 | Status: SHIPPED | OUTPATIENT
Start: 2018-06-01 | End: 2019-02-16 | Stop reason: SDUPTHER

## 2018-06-13 ENCOUNTER — TELEPHONE (OUTPATIENT)
Dept: FAMILY MEDICINE CLINIC | Age: 73
End: 2018-06-13

## 2018-06-13 DIAGNOSIS — F41.9 ANXIETY: ICD-10-CM

## 2018-06-14 RX ORDER — ALPRAZOLAM 0.25 MG/1
0.25 TABLET ORAL NIGHTLY PRN
Qty: 30 TABLET | Refills: 0 | Status: SHIPPED | OUTPATIENT
Start: 2018-06-14 | End: 2018-09-05 | Stop reason: SDUPTHER

## 2018-06-21 DIAGNOSIS — E78.2 MIXED HYPERLIPIDEMIA: ICD-10-CM

## 2018-06-26 ENCOUNTER — OFFICE VISIT (OUTPATIENT)
Dept: SURGERY | Age: 73
End: 2018-06-26

## 2018-06-26 VITALS
DIASTOLIC BLOOD PRESSURE: 72 MMHG | BODY MASS INDEX: 37.39 KG/M2 | HEART RATE: 52 BPM | SYSTOLIC BLOOD PRESSURE: 175 MMHG | HEIGHT: 63 IN | WEIGHT: 211 LBS

## 2018-06-26 DIAGNOSIS — I71.40 AAA (ABDOMINAL AORTIC ANEURYSM) WITHOUT RUPTURE: Primary | Chronic | ICD-10-CM

## 2018-06-26 PROCEDURE — 99213 OFFICE O/P EST LOW 20 MIN: CPT | Performed by: SURGERY

## 2018-06-26 PROCEDURE — 3017F COLORECTAL CA SCREEN DOC REV: CPT | Performed by: SURGERY

## 2018-06-26 PROCEDURE — 1123F ACP DISCUSS/DSCN MKR DOCD: CPT | Performed by: SURGERY

## 2018-06-26 PROCEDURE — G8417 CALC BMI ABV UP PARAM F/U: HCPCS | Performed by: SURGERY

## 2018-06-26 PROCEDURE — 1090F PRES/ABSN URINE INCON ASSESS: CPT | Performed by: SURGERY

## 2018-06-26 PROCEDURE — G8598 ASA/ANTIPLAT THER USED: HCPCS | Performed by: SURGERY

## 2018-06-26 PROCEDURE — 1036F TOBACCO NON-USER: CPT | Performed by: SURGERY

## 2018-06-26 PROCEDURE — G8399 PT W/DXA RESULTS DOCUMENT: HCPCS | Performed by: SURGERY

## 2018-06-26 PROCEDURE — 4040F PNEUMOC VAC/ADMIN/RCVD: CPT | Performed by: SURGERY

## 2018-06-26 PROCEDURE — G8427 DOCREV CUR MEDS BY ELIG CLIN: HCPCS | Performed by: SURGERY

## 2018-06-26 ASSESSMENT — ENCOUNTER SYMPTOMS
GASTROINTESTINAL NEGATIVE: 1
RESPIRATORY NEGATIVE: 1
ALLERGIC/IMMUNOLOGIC NEGATIVE: 1

## 2018-07-03 ENCOUNTER — TELEPHONE (OUTPATIENT)
Dept: SURGERY | Age: 73
End: 2018-07-03

## 2018-07-03 ENCOUNTER — PROCEDURE VISIT (OUTPATIENT)
Dept: SURGERY | Age: 73
End: 2018-07-03

## 2018-07-03 DIAGNOSIS — I71.40 ABDOMINAL AORTIC ANEURYSM (AAA) WITHOUT RUPTURE: ICD-10-CM

## 2018-07-03 DIAGNOSIS — Z98.890 S/P AAA REPAIR: ICD-10-CM

## 2018-07-03 DIAGNOSIS — I71.40 AAA (ABDOMINAL AORTIC ANEURYSM) WITHOUT RUPTURE: Chronic | ICD-10-CM

## 2018-07-03 DIAGNOSIS — Z86.79 S/P AAA REPAIR: ICD-10-CM

## 2018-07-03 PROCEDURE — 93978 VASCULAR STUDY: CPT | Performed by: SURGERY

## 2018-07-03 RX ORDER — METOPROLOL SUCCINATE 50 MG/1
50 TABLET, EXTENDED RELEASE ORAL DAILY
Qty: 30 TABLET | Refills: 3 | Status: SHIPPED | OUTPATIENT
Start: 2018-07-03 | End: 2018-12-29 | Stop reason: SDUPTHER

## 2018-07-03 RX ORDER — LISINOPRIL 10 MG/1
10 TABLET ORAL DAILY
Qty: 30 TABLET | Refills: 3 | Status: SHIPPED | OUTPATIENT
Start: 2018-07-03 | End: 2018-12-20 | Stop reason: SDUPTHER

## 2018-07-03 NOTE — TELEPHONE ENCOUNTER
Call has been placed to the patient, regarding the recent AAA study performed on 7/3/2018. Per Dr. Amado Gallagher she would be advised the results were normal she should keep a 6 month f/u with a AAA study.

## 2018-07-03 NOTE — TELEPHONE ENCOUNTER
Medication Refill    When was your last appointment with cardiology?  (if 1year or longer, please schedule an appointment) 5/1/18    Medication needing refilled:  Lisinopril 10 mg  Metoprolol 50 mg    Doseage of the medication:    How are you taking this medication (QD, BID, TID, QID, PRN):    Patient want a 30 or 90 day supply called in: 30 day    Which Pharmacy are we sending the medication to: [address] [facility], [address] [place], [place] [address]del Gayle [phone] - F [phone]    Pharmacy Phone number:    Pharmacy Fax number:

## 2018-07-03 NOTE — TELEPHONE ENCOUNTER
Last O/V:  5/1/18  Next O/V:  11/6/18    Last Labs:  CMP:  5/31/18    Please sign in Dr. Aby Tran absence, thank you.

## 2018-07-06 ENCOUNTER — TELEPHONE (OUTPATIENT)
Dept: SURGERY | Age: 73
End: 2018-07-06

## 2018-07-09 NOTE — TELEPHONE ENCOUNTER
The patient has been advised of the results of the AAA study, she will keep scheduled apt in January for a repeat AAA study with OV.

## 2018-07-16 ENCOUNTER — TELEPHONE (OUTPATIENT)
Dept: PULMONOLOGY | Age: 73
End: 2018-07-16

## 2018-08-08 ENCOUNTER — OFFICE VISIT (OUTPATIENT)
Dept: FAMILY MEDICINE CLINIC | Age: 73
End: 2018-08-08

## 2018-08-08 VITALS
BODY MASS INDEX: 37.74 KG/M2 | TEMPERATURE: 97.5 F | SYSTOLIC BLOOD PRESSURE: 118 MMHG | WEIGHT: 213 LBS | DIASTOLIC BLOOD PRESSURE: 82 MMHG | HEIGHT: 63 IN

## 2018-08-08 DIAGNOSIS — R21 RASH: Primary | ICD-10-CM

## 2018-08-08 PROCEDURE — 4040F PNEUMOC VAC/ADMIN/RCVD: CPT | Performed by: FAMILY MEDICINE

## 2018-08-08 PROCEDURE — 3288F FALL RISK ASSESSMENT DOCD: CPT | Performed by: FAMILY MEDICINE

## 2018-08-08 PROCEDURE — G8510 SCR DEP NEG, NO PLAN REQD: HCPCS | Performed by: FAMILY MEDICINE

## 2018-08-08 PROCEDURE — G8417 CALC BMI ABV UP PARAM F/U: HCPCS | Performed by: FAMILY MEDICINE

## 2018-08-08 PROCEDURE — G8598 ASA/ANTIPLAT THER USED: HCPCS | Performed by: FAMILY MEDICINE

## 2018-08-08 PROCEDURE — G8427 DOCREV CUR MEDS BY ELIG CLIN: HCPCS | Performed by: FAMILY MEDICINE

## 2018-08-08 PROCEDURE — 1123F ACP DISCUSS/DSCN MKR DOCD: CPT | Performed by: FAMILY MEDICINE

## 2018-08-08 PROCEDURE — 1036F TOBACCO NON-USER: CPT | Performed by: FAMILY MEDICINE

## 2018-08-08 PROCEDURE — 1101F PT FALLS ASSESS-DOCD LE1/YR: CPT | Performed by: FAMILY MEDICINE

## 2018-08-08 PROCEDURE — 99212 OFFICE O/P EST SF 10 MIN: CPT | Performed by: FAMILY MEDICINE

## 2018-08-08 PROCEDURE — 1090F PRES/ABSN URINE INCON ASSESS: CPT | Performed by: FAMILY MEDICINE

## 2018-08-08 PROCEDURE — 3017F COLORECTAL CA SCREEN DOC REV: CPT | Performed by: FAMILY MEDICINE

## 2018-08-08 PROCEDURE — G8399 PT W/DXA RESULTS DOCUMENT: HCPCS | Performed by: FAMILY MEDICINE

## 2018-08-08 RX ORDER — FLUCONAZOLE 100 MG/1
100 TABLET ORAL DAILY
Qty: 3 TABLET | Refills: 0 | Status: SHIPPED | OUTPATIENT
Start: 2018-08-08 | End: 2018-08-11

## 2018-08-08 ASSESSMENT — PATIENT HEALTH QUESTIONNAIRE - PHQ9
1. LITTLE INTEREST OR PLEASURE IN DOING THINGS: 0
SUM OF ALL RESPONSES TO PHQ9 QUESTIONS 1 & 2: 0
2. FEELING DOWN, DEPRESSED OR HOPELESS: 0
SUM OF ALL RESPONSES TO PHQ QUESTIONS 1-9: 0
SUM OF ALL RESPONSES TO PHQ QUESTIONS 1-9: 0

## 2018-08-10 ENCOUNTER — TELEPHONE (OUTPATIENT)
Dept: FAMILY MEDICINE CLINIC | Age: 73
End: 2018-08-10

## 2018-08-10 NOTE — TELEPHONE ENCOUNTER
The medicine I sent in on the 8th   The 3 pills should do fine for that  There is nothing else will need to give it a few days

## 2018-08-14 ENCOUNTER — TELEPHONE (OUTPATIENT)
Dept: FAMILY MEDICINE CLINIC | Age: 73
End: 2018-08-14

## 2018-08-14 RX ORDER — NYSTATIN 100000 U/G
CREAM TOPICAL
Qty: 60 G | Refills: 0 | Status: SHIPPED | OUTPATIENT
Start: 2018-08-14 | End: 2018-09-27 | Stop reason: ALTCHOICE

## 2018-09-04 ENCOUNTER — TELEPHONE (OUTPATIENT)
Dept: FAMILY MEDICINE CLINIC | Age: 73
End: 2018-09-04

## 2018-09-04 DIAGNOSIS — F41.9 ANXIETY: ICD-10-CM

## 2018-09-05 RX ORDER — ALPRAZOLAM 0.25 MG/1
0.25 TABLET ORAL NIGHTLY PRN
Qty: 30 TABLET | Refills: 0 | Status: SHIPPED | OUTPATIENT
Start: 2018-09-05 | End: 2018-10-05

## 2018-09-27 ENCOUNTER — OFFICE VISIT (OUTPATIENT)
Dept: PULMONOLOGY | Age: 73
End: 2018-09-27
Payer: MEDICARE

## 2018-09-27 VITALS
TEMPERATURE: 97.1 F | BODY MASS INDEX: 37.39 KG/M2 | OXYGEN SATURATION: 95 % | WEIGHT: 211 LBS | SYSTOLIC BLOOD PRESSURE: 174 MMHG | RESPIRATION RATE: 20 BRPM | HEIGHT: 63 IN | HEART RATE: 57 BPM | DIASTOLIC BLOOD PRESSURE: 82 MMHG

## 2018-09-27 DIAGNOSIS — J44.9 COPD, SEVERE (HCC): Primary | ICD-10-CM

## 2018-09-27 DIAGNOSIS — Z87.891 PERSONAL HISTORY OF TOBACCO USE: ICD-10-CM

## 2018-09-27 DIAGNOSIS — J43.8 OTHER EMPHYSEMA (HCC): ICD-10-CM

## 2018-09-27 PROCEDURE — 99213 OFFICE O/P EST LOW 20 MIN: CPT | Performed by: INTERNAL MEDICINE

## 2018-09-27 PROCEDURE — 1123F ACP DISCUSS/DSCN MKR DOCD: CPT | Performed by: INTERNAL MEDICINE

## 2018-09-27 PROCEDURE — 3017F COLORECTAL CA SCREEN DOC REV: CPT | Performed by: INTERNAL MEDICINE

## 2018-09-27 PROCEDURE — 1036F TOBACCO NON-USER: CPT | Performed by: INTERNAL MEDICINE

## 2018-09-27 PROCEDURE — 4040F PNEUMOC VAC/ADMIN/RCVD: CPT | Performed by: INTERNAL MEDICINE

## 2018-09-27 PROCEDURE — G8427 DOCREV CUR MEDS BY ELIG CLIN: HCPCS | Performed by: INTERNAL MEDICINE

## 2018-09-27 PROCEDURE — 3023F SPIROM DOC REV: CPT | Performed by: INTERNAL MEDICINE

## 2018-09-27 PROCEDURE — 1090F PRES/ABSN URINE INCON ASSESS: CPT | Performed by: INTERNAL MEDICINE

## 2018-09-27 PROCEDURE — G8417 CALC BMI ABV UP PARAM F/U: HCPCS | Performed by: INTERNAL MEDICINE

## 2018-09-27 PROCEDURE — G8926 SPIRO NO PERF OR DOC: HCPCS | Performed by: INTERNAL MEDICINE

## 2018-09-27 PROCEDURE — G0296 VISIT TO DETERM LDCT ELIG: HCPCS | Performed by: INTERNAL MEDICINE

## 2018-09-27 PROCEDURE — G8598 ASA/ANTIPLAT THER USED: HCPCS | Performed by: INTERNAL MEDICINE

## 2018-09-27 PROCEDURE — G8399 PT W/DXA RESULTS DOCUMENT: HCPCS | Performed by: INTERNAL MEDICINE

## 2018-09-27 PROCEDURE — 1101F PT FALLS ASSESS-DOCD LE1/YR: CPT | Performed by: INTERNAL MEDICINE

## 2018-09-27 NOTE — PROGRESS NOTES
Chief complaint  This is a 68y.o. year old female  who comes to see me with a chief complaint of   Chief Complaint   Patient presents with    COPD       HPI  Here with CC of COPD    COPD: still using both albuterol and spiriva on prn basis. Is controlled for the most part. No complaints of issues. Inhalers are costly for her    Past Medical History:   Diagnosis Date    AAA (abdominal aortic aneurysm) ([address]     pt states it is 4cm    AAA (abdominal aortic aneurysm) without rupture ([address] 2/10/2015    Atrial fibrillation (HCC)     CAD (coronary artery disease)     CHF (congestive heart failure) ([address]     COPD (chronic obstructive pulmonary disease) (HCC)     History of blood clots     Hyperlipidemia     Hypertension        Past Surgical History:   Procedure Laterality Date    ABDOMINAL AORTIC ANEURYSM REPAIR      Endovascular abdominal AA    APPENDECTOMY  1990    incidental    BLADDER SUSPENSION      CATARACT REMOVAL      CHOLECYSTECTOMY  10/15/13    COLONOSCOPY  9/2/07    dr Oumar Darby and check in 5 years.  COLONOSCOPY  06/16/2017    ok dr arndt, repeat 5 years   [address]    for benign tumor. just the uterus    JOINT REPLACEMENT  12/2013    right knee replacement    TONSILLECTOMY  as a child    TUMOR EXCISION      benign behind right ear about 2008     Current Outpatient Prescriptions   Medication Sig Dispense Refill    ALPRAZolam (XANAX) 0.25 MG tablet Take 1 tablet by mouth nightly as needed for Sleep or Anxiety for up to 30 days. . 30 tablet 0    metoprolol succinate (TOPROL XL) 50 MG extended release tablet Take 1 tablet by mouth daily 30 tablet 3    lisinopril (PRINIVIL;ZESTRIL) 10 MG tablet Take 1 tablet by mouth daily 30 tablet 3    isosorbide mononitrate (IMDUR) 30 MG extended release tablet Take 1 tablet by mouth daily 30 tablet 5    furosemide (LASIX) 40 MG tablet Take 1 tablet by mouth 2 times daily (Patient taking differently: Take 60 mg by mouth 2 times daily ) 60 tablet 3    tiotropium (SPIRIVA RESPIMAT) 2.5 MCG/ACT AERS inhaler Inhale 2 puffs into the lungs daily 1 Inhaler 5    XARELTO 20 MG TABS tablet TAKE ONE TABLET BY MOUTH DAILY WITH BREAKFAST 30 tablet 5    albuterol sulfate HFA (PROAIR HFA) 108 (90 Base) MCG/ACT inhaler Inhale 2 puffs into the lungs every 4 hours as needed for Wheezing or Shortness of Breath 1 Inhaler 5    albuterol (PROVENTIL) (2.5 MG/3ML) 0.083% nebulizer solution Take 2.5 mg by nebulization 4 times daily      nitroGLYCERIN (NITROSTAT) 0.4 MG SL tablet Place 1 tablet under the tongue every 5 minutes as needed for Chest pain 25 tablet 1    Multiple Vitamins-Minerals (MULTI FOR HER 50+ PO) Take 1 tablet by mouth daily      rosuvastatin (CRESTOR) 40 MG tablet Take 1 tablet by mouth daily 90 tablet 3    potassium chloride (KLOR-CON M) 20 MEQ extended release tablet Take 10 mEq by mouth daily        No current facility-administered medications for this visit. PHYSICAL EXAM:  Vitals:    09/27/18 0936   BP: (!) 176/83   Pulse: 57   Resp: 20   Temp: 97.1 °F (36.2 °C)   SpO2: 95%     GENERAL:  Well nourished, alert, appears stated age, no distress, obese female  HEENT:  No scleral icterus, no conjunctival irritation  NECK:  No thyromegaly, no bruits  LYMPH:  No cervical or supraclavicular adenopathy  HEART:  Regular rate and rhythm, no murmurs  LUNGS:  Clear bilaterally. ABDOMEN:  No distention, no organomegaly  EXTREMITIES:  Trace if any edema, no digital clubbing  NEURO:  No localizing deficits, CN II-XII intact    Pulmonary Function Testing 7/2015  Severe obstruction with no response to bronchodilators    Moderate Restriction    Moderate Reduction in diffusing capacity     Chest imaging from 9/2017 is reviewed. Emphysema, small nodules bilaterally     ECHO 2/2016   There is moderate concentric left ventricular hypertrophy. Ejection fraction   is visually estimated to be 60-65 %. No regional wall motion abnormalities   are noted.

## 2018-10-04 ENCOUNTER — HOSPITAL ENCOUNTER (OUTPATIENT)
Dept: CT IMAGING | Age: 73
Discharge: HOME | End: 2018-10-04
Payer: MEDICARE

## 2018-10-04 DIAGNOSIS — Z87.891 PERSONAL HISTORY OF TOBACCO USE: ICD-10-CM

## 2018-10-04 PROCEDURE — G0297 LDCT FOR LUNG CA SCREEN: HCPCS

## 2018-10-04 RX ORDER — RIVAROXABAN 20 MG/1
TABLET, FILM COATED ORAL
Qty: 30 TABLET | Refills: 4 | Status: SHIPPED | OUTPATIENT
Start: 2018-10-04 | End: 2019-05-04 | Stop reason: SDUPTHER

## 2018-10-24 ENCOUNTER — TELEPHONE (OUTPATIENT)
Dept: PULMONOLOGY | Age: 73
End: 2018-10-24

## 2018-10-25 ENCOUNTER — TELEPHONE (OUTPATIENT)
Dept: PULMONOLOGY | Age: 73
End: 2018-10-25

## 2018-10-25 NOTE — TELEPHONE ENCOUNTER
Patient called back yesterday and did clarify that she is using med 4 home. Does the script that we received just need signed by the doctor or a script printed?

## 2018-12-20 RX ORDER — LISINOPRIL 10 MG/1
10 TABLET ORAL DAILY
Qty: 30 TABLET | Refills: 2 | Status: SHIPPED | OUTPATIENT
Start: 2018-12-20 | End: 2019-01-23 | Stop reason: SDUPTHER

## 2019-01-02 RX ORDER — METOPROLOL SUCCINATE 50 MG/1
TABLET, EXTENDED RELEASE ORAL
Qty: 30 TABLET | Refills: 2 | Status: SHIPPED | OUTPATIENT
Start: 2019-01-02 | End: 2019-05-01 | Stop reason: SDUPTHER

## 2019-01-08 ENCOUNTER — HOSPITAL ENCOUNTER (OUTPATIENT)
Age: 74
Discharge: HOME | End: 2019-01-08
Payer: MEDICARE

## 2019-01-08 ENCOUNTER — OFFICE VISIT (OUTPATIENT)
Dept: FAMILY MEDICINE CLINIC | Age: 74
End: 2019-01-08
Payer: MEDICARE

## 2019-01-08 ENCOUNTER — HOSPITAL ENCOUNTER (OUTPATIENT)
Dept: GENERAL RADIOLOGY | Age: 74
Discharge: HOME | End: 2019-01-08
Payer: MEDICARE

## 2019-01-08 VITALS
DIASTOLIC BLOOD PRESSURE: 80 MMHG | SYSTOLIC BLOOD PRESSURE: 136 MMHG | BODY MASS INDEX: 37.39 KG/M2 | HEIGHT: 63 IN | TEMPERATURE: 97.6 F | WEIGHT: 211 LBS

## 2019-01-08 DIAGNOSIS — R05.9 COUGH: ICD-10-CM

## 2019-01-08 DIAGNOSIS — R05.9 COUGH: Primary | ICD-10-CM

## 2019-01-08 DIAGNOSIS — R06.2 WHEEZING: ICD-10-CM

## 2019-01-08 PROCEDURE — 1090F PRES/ABSN URINE INCON ASSESS: CPT | Performed by: FAMILY MEDICINE

## 2019-01-08 PROCEDURE — 3017F COLORECTAL CA SCREEN DOC REV: CPT | Performed by: FAMILY MEDICINE

## 2019-01-08 PROCEDURE — 99213 OFFICE O/P EST LOW 20 MIN: CPT | Performed by: FAMILY MEDICINE

## 2019-01-08 PROCEDURE — 71046 X-RAY EXAM CHEST 2 VIEWS: CPT

## 2019-01-08 PROCEDURE — 1101F PT FALLS ASSESS-DOCD LE1/YR: CPT | Performed by: FAMILY MEDICINE

## 2019-01-08 PROCEDURE — 4040F PNEUMOC VAC/ADMIN/RCVD: CPT | Performed by: FAMILY MEDICINE

## 2019-01-08 PROCEDURE — 1036F TOBACCO NON-USER: CPT | Performed by: FAMILY MEDICINE

## 2019-01-08 PROCEDURE — G8598 ASA/ANTIPLAT THER USED: HCPCS | Performed by: FAMILY MEDICINE

## 2019-01-08 PROCEDURE — G8417 CALC BMI ABV UP PARAM F/U: HCPCS | Performed by: FAMILY MEDICINE

## 2019-01-08 PROCEDURE — G8399 PT W/DXA RESULTS DOCUMENT: HCPCS | Performed by: FAMILY MEDICINE

## 2019-01-08 PROCEDURE — G8427 DOCREV CUR MEDS BY ELIG CLIN: HCPCS | Performed by: FAMILY MEDICINE

## 2019-01-08 PROCEDURE — G8484 FLU IMMUNIZE NO ADMIN: HCPCS | Performed by: FAMILY MEDICINE

## 2019-01-08 PROCEDURE — 1123F ACP DISCUSS/DSCN MKR DOCD: CPT | Performed by: FAMILY MEDICINE

## 2019-01-08 RX ORDER — CEFDINIR 300 MG/1
300 CAPSULE ORAL 2 TIMES DAILY
Qty: 20 CAPSULE | Refills: 0 | Status: SHIPPED | OUTPATIENT
Start: 2019-01-08 | End: 2019-01-18

## 2019-01-16 ENCOUNTER — TELEPHONE (OUTPATIENT)
Dept: FAMILY MEDICINE CLINIC | Age: 74
End: 2019-01-16

## 2019-01-23 RX ORDER — LISINOPRIL 10 MG/1
10 TABLET ORAL DAILY
Qty: 30 TABLET | Refills: 2 | Status: SHIPPED | OUTPATIENT
Start: 2019-01-23 | End: 2019-03-01 | Stop reason: DRUGHIGH

## 2019-02-06 ENCOUNTER — OFFICE VISIT (OUTPATIENT)
Dept: CARDIOLOGY CLINIC | Age: 74
End: 2019-02-06
Payer: MEDICARE

## 2019-02-06 VITALS
BODY MASS INDEX: 36.7 KG/M2 | SYSTOLIC BLOOD PRESSURE: 154 MMHG | DIASTOLIC BLOOD PRESSURE: 64 MMHG | WEIGHT: 215 LBS | OXYGEN SATURATION: 97 % | HEIGHT: 64 IN | HEART RATE: 64 BPM

## 2019-02-06 DIAGNOSIS — R06.02 SHORTNESS OF BREATH: ICD-10-CM

## 2019-02-06 DIAGNOSIS — I10 ESSENTIAL HYPERTENSION: ICD-10-CM

## 2019-02-06 DIAGNOSIS — I25.10 CORONARY ARTERY DISEASE INVOLVING NATIVE HEART WITHOUT ANGINA PECTORIS, UNSPECIFIED VESSEL OR LESION TYPE: Primary | Chronic | ICD-10-CM

## 2019-02-06 DIAGNOSIS — G47.30 SLEEP APNEA, UNSPECIFIED TYPE: ICD-10-CM

## 2019-02-06 DIAGNOSIS — I50.32 CHRONIC DIASTOLIC HF (HEART FAILURE) (HCC): ICD-10-CM

## 2019-02-06 DIAGNOSIS — I48.0 PAF (PAROXYSMAL ATRIAL FIBRILLATION) (HCC): ICD-10-CM

## 2019-02-06 LAB
ANION GAP SERPL CALCULATED.3IONS-SCNC: 13 MMOL/L (ref 3–16)
BUN BLDV-MCNC: 23 MG/DL (ref 7–20)
CALCIUM SERPL-MCNC: 9.4 MG/DL (ref 8.3–10.6)
CHLORIDE BLD-SCNC: 99 MMOL/L (ref 99–110)
CO2: 27 MMOL/L (ref 21–32)
CREAT SERPL-MCNC: 1.3 MG/DL (ref 0.6–1.2)
GFR AFRICAN AMERICAN: 49
GFR NON-AFRICAN AMERICAN: 40
GLUCOSE BLD-MCNC: 112 MG/DL (ref 70–99)
HCT VFR BLD CALC: 40.5 % (ref 36–48)
HEMOGLOBIN: 13.2 G/DL (ref 12–16)
MCH RBC QN AUTO: 28.5 PG (ref 26–34)
MCHC RBC AUTO-ENTMCNC: 32.7 G/DL (ref 31–36)
MCV RBC AUTO: 86.9 FL (ref 80–100)
PDW BLD-RTO: 15.9 % (ref 12.4–15.4)
PLATELET # BLD: 234 K/UL (ref 135–450)
PMV BLD AUTO: 10.7 FL (ref 5–10.5)
POTASSIUM SERPL-SCNC: 4.4 MMOL/L (ref 3.5–5.1)
PRO-BNP: 2865 PG/ML (ref 0–124)
RBC # BLD: 4.65 M/UL (ref 4–5.2)
SODIUM BLD-SCNC: 139 MMOL/L (ref 136–145)
WBC # BLD: 10.2 K/UL (ref 4–11)

## 2019-02-06 PROCEDURE — 99214 OFFICE O/P EST MOD 30 MIN: CPT | Performed by: INTERNAL MEDICINE

## 2019-02-06 PROCEDURE — 1101F PT FALLS ASSESS-DOCD LE1/YR: CPT | Performed by: INTERNAL MEDICINE

## 2019-02-06 PROCEDURE — 4040F PNEUMOC VAC/ADMIN/RCVD: CPT | Performed by: INTERNAL MEDICINE

## 2019-02-06 PROCEDURE — G8484 FLU IMMUNIZE NO ADMIN: HCPCS | Performed by: INTERNAL MEDICINE

## 2019-02-06 PROCEDURE — 93000 ELECTROCARDIOGRAM COMPLETE: CPT | Performed by: INTERNAL MEDICINE

## 2019-02-06 PROCEDURE — 1123F ACP DISCUSS/DSCN MKR DOCD: CPT | Performed by: INTERNAL MEDICINE

## 2019-02-06 PROCEDURE — 3017F COLORECTAL CA SCREEN DOC REV: CPT | Performed by: INTERNAL MEDICINE

## 2019-02-06 PROCEDURE — 1036F TOBACCO NON-USER: CPT | Performed by: INTERNAL MEDICINE

## 2019-02-06 PROCEDURE — 1090F PRES/ABSN URINE INCON ASSESS: CPT | Performed by: INTERNAL MEDICINE

## 2019-02-06 PROCEDURE — G8417 CALC BMI ABV UP PARAM F/U: HCPCS | Performed by: INTERNAL MEDICINE

## 2019-02-06 PROCEDURE — G8598 ASA/ANTIPLAT THER USED: HCPCS | Performed by: INTERNAL MEDICINE

## 2019-02-06 PROCEDURE — G8427 DOCREV CUR MEDS BY ELIG CLIN: HCPCS | Performed by: INTERNAL MEDICINE

## 2019-02-06 PROCEDURE — G8399 PT W/DXA RESULTS DOCUMENT: HCPCS | Performed by: INTERNAL MEDICINE

## 2019-02-07 ENCOUNTER — HOSPITAL ENCOUNTER (OUTPATIENT)
Dept: GENERAL RADIOLOGY | Age: 74
Discharge: HOME | End: 2019-02-07
Payer: MEDICARE

## 2019-02-07 ENCOUNTER — HOSPITAL ENCOUNTER (OUTPATIENT)
Age: 74
Discharge: HOME | End: 2019-02-07
Payer: MEDICARE

## 2019-02-07 DIAGNOSIS — R06.02 SHORTNESS OF BREATH: ICD-10-CM

## 2019-02-07 DIAGNOSIS — R06.02 SOB (SHORTNESS OF BREATH): Primary | ICD-10-CM

## 2019-02-07 PROCEDURE — 71046 X-RAY EXAM CHEST 2 VIEWS: CPT

## 2019-02-07 RX ORDER — FUROSEMIDE 40 MG/1
60 TABLET ORAL 2 TIMES DAILY
Qty: 90 TABLET | Refills: 3 | Status: SHIPPED | OUTPATIENT
Start: 2019-02-07 | End: 2019-11-20 | Stop reason: SDUPTHER

## 2019-02-15 ENCOUNTER — OFFICE VISIT (OUTPATIENT)
Dept: SURGERY | Age: 74
End: 2019-02-15
Payer: MEDICARE

## 2019-02-15 ENCOUNTER — PROCEDURE VISIT (OUTPATIENT)
Dept: SURGERY | Age: 74
End: 2019-02-15
Payer: MEDICARE

## 2019-02-15 VITALS
WEIGHT: 216 LBS | SYSTOLIC BLOOD PRESSURE: 152 MMHG | BODY MASS INDEX: 37.08 KG/M2 | HEART RATE: 55 BPM | DIASTOLIC BLOOD PRESSURE: 76 MMHG

## 2019-02-15 DIAGNOSIS — I71.40 AAA (ABDOMINAL AORTIC ANEURYSM) WITHOUT RUPTURE: Primary | Chronic | ICD-10-CM

## 2019-02-15 DIAGNOSIS — M79.89 LEG SWELLING: ICD-10-CM

## 2019-02-15 DIAGNOSIS — I65.22 CAROTID STENOSIS, ASYMPTOMATIC, LEFT: ICD-10-CM

## 2019-02-15 DIAGNOSIS — Z95.828 HISTORY OF ENDOVASCULAR STENT GRAFT FOR ABDOMINAL AORTIC ANEURYSM (AAA): Primary | ICD-10-CM

## 2019-02-15 PROCEDURE — 3017F COLORECTAL CA SCREEN DOC REV: CPT | Performed by: SURGERY

## 2019-02-15 PROCEDURE — G8417 CALC BMI ABV UP PARAM F/U: HCPCS | Performed by: SURGERY

## 2019-02-15 PROCEDURE — 1123F ACP DISCUSS/DSCN MKR DOCD: CPT | Performed by: SURGERY

## 2019-02-15 PROCEDURE — 4040F PNEUMOC VAC/ADMIN/RCVD: CPT | Performed by: SURGERY

## 2019-02-15 PROCEDURE — G8598 ASA/ANTIPLAT THER USED: HCPCS | Performed by: SURGERY

## 2019-02-15 PROCEDURE — 1036F TOBACCO NON-USER: CPT | Performed by: SURGERY

## 2019-02-15 PROCEDURE — G8484 FLU IMMUNIZE NO ADMIN: HCPCS | Performed by: SURGERY

## 2019-02-15 PROCEDURE — 99214 OFFICE O/P EST MOD 30 MIN: CPT | Performed by: SURGERY

## 2019-02-15 PROCEDURE — 1090F PRES/ABSN URINE INCON ASSESS: CPT | Performed by: SURGERY

## 2019-02-15 PROCEDURE — G8427 DOCREV CUR MEDS BY ELIG CLIN: HCPCS | Performed by: SURGERY

## 2019-02-15 PROCEDURE — 93978 VASCULAR STUDY: CPT | Performed by: SURGERY

## 2019-02-15 PROCEDURE — G8399 PT W/DXA RESULTS DOCUMENT: HCPCS | Performed by: SURGERY

## 2019-02-15 PROCEDURE — 1101F PT FALLS ASSESS-DOCD LE1/YR: CPT | Performed by: SURGERY

## 2019-02-15 PROCEDURE — 93880 EXTRACRANIAL BILAT STUDY: CPT | Performed by: SURGERY

## 2019-02-15 ASSESSMENT — ENCOUNTER SYMPTOMS
RESPIRATORY NEGATIVE: 1
GASTROINTESTINAL NEGATIVE: 1
ALLERGIC/IMMUNOLOGIC NEGATIVE: 1

## 2019-02-18 RX ORDER — ISOSORBIDE MONONITRATE 30 MG/1
TABLET, EXTENDED RELEASE ORAL
Qty: 90 TABLET | Refills: 3 | Status: SHIPPED | OUTPATIENT
Start: 2019-02-18 | End: 2020-04-27

## 2019-02-20 ENCOUNTER — HOSPITAL ENCOUNTER (OUTPATIENT)
Dept: NON INVASIVE DIAGNOSTICS | Age: 74
Discharge: HOME | End: 2019-02-20
Payer: MEDICARE

## 2019-02-20 DIAGNOSIS — R06.02 SHORTNESS OF BREATH: ICD-10-CM

## 2019-02-20 LAB
LV EF: 65 %
LVEF MODALITY: NORMAL

## 2019-02-20 PROCEDURE — 93306 TTE W/DOPPLER COMPLETE: CPT

## 2019-03-01 ENCOUNTER — TELEPHONE (OUTPATIENT)
Dept: FAMILY MEDICINE CLINIC | Age: 74
End: 2019-03-01

## 2019-03-01 RX ORDER — LISINOPRIL 10 MG/1
20 TABLET ORAL DAILY
Qty: 30 TABLET | Refills: 2 | Status: SHIPPED
Start: 2019-03-01 | End: 2019-11-20 | Stop reason: SDUPTHER

## 2019-03-05 ENCOUNTER — OFFICE VISIT (OUTPATIENT)
Dept: FAMILY MEDICINE CLINIC | Age: 74
End: 2019-03-05
Payer: MEDICARE

## 2019-03-05 VITALS
WEIGHT: 217.8 LBS | BODY MASS INDEX: 37.18 KG/M2 | TEMPERATURE: 97.8 F | SYSTOLIC BLOOD PRESSURE: 154 MMHG | HEIGHT: 64 IN | HEART RATE: 80 BPM | DIASTOLIC BLOOD PRESSURE: 80 MMHG

## 2019-03-05 DIAGNOSIS — I10 ESSENTIAL HYPERTENSION: Primary | ICD-10-CM

## 2019-03-05 DIAGNOSIS — Z12.31 SCREENING MAMMOGRAM, ENCOUNTER FOR: ICD-10-CM

## 2019-03-05 PROCEDURE — 1123F ACP DISCUSS/DSCN MKR DOCD: CPT | Performed by: FAMILY MEDICINE

## 2019-03-05 PROCEDURE — 99213 OFFICE O/P EST LOW 20 MIN: CPT | Performed by: FAMILY MEDICINE

## 2019-03-05 PROCEDURE — G8427 DOCREV CUR MEDS BY ELIG CLIN: HCPCS | Performed by: FAMILY MEDICINE

## 2019-03-05 PROCEDURE — G8399 PT W/DXA RESULTS DOCUMENT: HCPCS | Performed by: FAMILY MEDICINE

## 2019-03-05 PROCEDURE — 3017F COLORECTAL CA SCREEN DOC REV: CPT | Performed by: FAMILY MEDICINE

## 2019-03-05 PROCEDURE — 1036F TOBACCO NON-USER: CPT | Performed by: FAMILY MEDICINE

## 2019-03-05 PROCEDURE — 1090F PRES/ABSN URINE INCON ASSESS: CPT | Performed by: FAMILY MEDICINE

## 2019-03-05 PROCEDURE — G8598 ASA/ANTIPLAT THER USED: HCPCS | Performed by: FAMILY MEDICINE

## 2019-03-05 PROCEDURE — G8484 FLU IMMUNIZE NO ADMIN: HCPCS | Performed by: FAMILY MEDICINE

## 2019-03-05 PROCEDURE — 1101F PT FALLS ASSESS-DOCD LE1/YR: CPT | Performed by: FAMILY MEDICINE

## 2019-03-05 PROCEDURE — G8417 CALC BMI ABV UP PARAM F/U: HCPCS | Performed by: FAMILY MEDICINE

## 2019-03-05 PROCEDURE — 4040F PNEUMOC VAC/ADMIN/RCVD: CPT | Performed by: FAMILY MEDICINE

## 2019-03-05 RX ORDER — TRIAMCINOLONE ACETONIDE 1 MG/G
CREAM TOPICAL
COMMUNITY
Start: 2019-01-31 | End: 2020-02-15

## 2019-03-05 RX ORDER — CLOBETASOL PROPIONATE 0.5 MG/G
CREAM TOPICAL
COMMUNITY
Start: 2019-01-31 | End: 2020-02-15

## 2019-03-05 RX ORDER — NITROGLYCERIN 0.4 MG/1
0.4 TABLET SUBLINGUAL EVERY 5 MIN PRN
Qty: 25 TABLET | Refills: 1 | Status: SHIPPED | OUTPATIENT
Start: 2019-03-05 | End: 2022-09-29 | Stop reason: SDUPTHER

## 2019-03-05 ASSESSMENT — PATIENT HEALTH QUESTIONNAIRE - PHQ9
SUM OF ALL RESPONSES TO PHQ QUESTIONS 1-9: 0
1. LITTLE INTEREST OR PLEASURE IN DOING THINGS: 0
SUM OF ALL RESPONSES TO PHQ QUESTIONS 1-9: 0
SUM OF ALL RESPONSES TO PHQ9 QUESTIONS 1 & 2: 0
2. FEELING DOWN, DEPRESSED OR HOPELESS: 0

## 2019-03-05 ASSESSMENT — ENCOUNTER SYMPTOMS
BLOOD IN STOOL: 0
SHORTNESS OF BREATH: 0
CONSTIPATION: 0
NAUSEA: 0
ABDOMINAL PAIN: 0
VOMITING: 0
DIARRHEA: 0

## 2019-03-11 ENCOUNTER — HOSPITAL ENCOUNTER (OUTPATIENT)
Dept: MAMMOGRAPHY | Age: 74
Discharge: HOME | End: 2019-03-16
Payer: MEDICARE

## 2019-03-11 DIAGNOSIS — Z12.31 SCREENING MAMMOGRAM, ENCOUNTER FOR: ICD-10-CM

## 2019-03-11 PROCEDURE — 77067 SCR MAMMO BI INCL CAD: CPT

## 2019-03-13 DIAGNOSIS — J44.9 CHRONIC OBSTRUCTIVE PULMONARY DISEASE, UNSPECIFIED COPD TYPE (HCC): ICD-10-CM

## 2019-03-13 RX ORDER — TIOTROPIUM BROMIDE INHALATION SPRAY 3.12 UG/1
SPRAY, METERED RESPIRATORY (INHALATION)
Qty: 1 INHALER | Refills: 4 | Status: SHIPPED | OUTPATIENT
Start: 2019-03-13 | End: 2020-08-04

## 2019-03-14 ENCOUNTER — TELEPHONE (OUTPATIENT)
Dept: FAMILY MEDICINE CLINIC | Age: 74
End: 2019-03-14

## 2019-03-15 ENCOUNTER — PROCEDURE VISIT (OUTPATIENT)
Dept: SURGERY | Age: 74
End: 2019-03-15
Payer: MEDICARE

## 2019-03-15 ENCOUNTER — OFFICE VISIT (OUTPATIENT)
Dept: SURGERY | Age: 74
End: 2019-03-15
Payer: MEDICARE

## 2019-03-15 VITALS — BODY MASS INDEX: 37.76 KG/M2 | WEIGHT: 220 LBS | DIASTOLIC BLOOD PRESSURE: 84 MMHG | SYSTOLIC BLOOD PRESSURE: 162 MMHG

## 2019-03-15 DIAGNOSIS — I65.22 CAROTID STENOSIS, ASYMPTOMATIC, LEFT: Primary | ICD-10-CM

## 2019-03-15 DIAGNOSIS — I65.23 CAROTID ARTERY STENOSIS WITHOUT CEREBRAL INFARCTION, BILATERAL: Primary | ICD-10-CM

## 2019-03-15 DIAGNOSIS — E78.2 MIXED HYPERLIPIDEMIA: ICD-10-CM

## 2019-03-15 DIAGNOSIS — Z95.828 HISTORY OF ENDOVASCULAR STENT GRAFT FOR ABDOMINAL AORTIC ANEURYSM (AAA): ICD-10-CM

## 2019-03-15 DIAGNOSIS — I10 ESSENTIAL HYPERTENSION: ICD-10-CM

## 2019-03-15 PROCEDURE — 1101F PT FALLS ASSESS-DOCD LE1/YR: CPT | Performed by: NURSE PRACTITIONER

## 2019-03-15 PROCEDURE — G8427 DOCREV CUR MEDS BY ELIG CLIN: HCPCS | Performed by: NURSE PRACTITIONER

## 2019-03-15 PROCEDURE — 1036F TOBACCO NON-USER: CPT | Performed by: NURSE PRACTITIONER

## 2019-03-15 PROCEDURE — 1123F ACP DISCUSS/DSCN MKR DOCD: CPT | Performed by: NURSE PRACTITIONER

## 2019-03-15 PROCEDURE — G8598 ASA/ANTIPLAT THER USED: HCPCS | Performed by: NURSE PRACTITIONER

## 2019-03-15 PROCEDURE — 3017F COLORECTAL CA SCREEN DOC REV: CPT | Performed by: NURSE PRACTITIONER

## 2019-03-15 PROCEDURE — 4040F PNEUMOC VAC/ADMIN/RCVD: CPT | Performed by: NURSE PRACTITIONER

## 2019-03-15 PROCEDURE — 99214 OFFICE O/P EST MOD 30 MIN: CPT | Performed by: NURSE PRACTITIONER

## 2019-03-15 PROCEDURE — G8484 FLU IMMUNIZE NO ADMIN: HCPCS | Performed by: NURSE PRACTITIONER

## 2019-03-15 PROCEDURE — G8399 PT W/DXA RESULTS DOCUMENT: HCPCS | Performed by: NURSE PRACTITIONER

## 2019-03-15 PROCEDURE — 1090F PRES/ABSN URINE INCON ASSESS: CPT | Performed by: NURSE PRACTITIONER

## 2019-03-15 PROCEDURE — G8417 CALC BMI ABV UP PARAM F/U: HCPCS | Performed by: NURSE PRACTITIONER

## 2019-03-15 PROCEDURE — 93880 EXTRACRANIAL BILAT STUDY: CPT | Performed by: SURGERY

## 2019-03-26 ENCOUNTER — OFFICE VISIT (OUTPATIENT)
Dept: PULMONOLOGY | Age: 74
End: 2019-03-26
Payer: MEDICARE

## 2019-03-26 VITALS
RESPIRATION RATE: 20 BRPM | SYSTOLIC BLOOD PRESSURE: 219 MMHG | HEIGHT: 64 IN | HEART RATE: 56 BPM | DIASTOLIC BLOOD PRESSURE: 88 MMHG | OXYGEN SATURATION: 97 % | TEMPERATURE: 98 F | BODY MASS INDEX: 37.05 KG/M2 | WEIGHT: 217 LBS

## 2019-03-26 DIAGNOSIS — J43.2 CENTRILOBULAR EMPHYSEMA (HCC): ICD-10-CM

## 2019-03-26 DIAGNOSIS — R06.02 SOB (SHORTNESS OF BREATH): ICD-10-CM

## 2019-03-26 DIAGNOSIS — J44.9 COPD, SEVERE (HCC): Primary | ICD-10-CM

## 2019-03-26 PROCEDURE — G8399 PT W/DXA RESULTS DOCUMENT: HCPCS | Performed by: INTERNAL MEDICINE

## 2019-03-26 PROCEDURE — 1036F TOBACCO NON-USER: CPT | Performed by: INTERNAL MEDICINE

## 2019-03-26 PROCEDURE — 3017F COLORECTAL CA SCREEN DOC REV: CPT | Performed by: INTERNAL MEDICINE

## 2019-03-26 PROCEDURE — G8598 ASA/ANTIPLAT THER USED: HCPCS | Performed by: INTERNAL MEDICINE

## 2019-03-26 PROCEDURE — 3023F SPIROM DOC REV: CPT | Performed by: INTERNAL MEDICINE

## 2019-03-26 PROCEDURE — G8417 CALC BMI ABV UP PARAM F/U: HCPCS | Performed by: INTERNAL MEDICINE

## 2019-03-26 PROCEDURE — 99214 OFFICE O/P EST MOD 30 MIN: CPT | Performed by: INTERNAL MEDICINE

## 2019-03-26 PROCEDURE — G8484 FLU IMMUNIZE NO ADMIN: HCPCS | Performed by: INTERNAL MEDICINE

## 2019-03-26 PROCEDURE — 4040F PNEUMOC VAC/ADMIN/RCVD: CPT | Performed by: INTERNAL MEDICINE

## 2019-03-26 PROCEDURE — 1090F PRES/ABSN URINE INCON ASSESS: CPT | Performed by: INTERNAL MEDICINE

## 2019-03-26 PROCEDURE — G8926 SPIRO NO PERF OR DOC: HCPCS | Performed by: INTERNAL MEDICINE

## 2019-03-26 PROCEDURE — 1123F ACP DISCUSS/DSCN MKR DOCD: CPT | Performed by: INTERNAL MEDICINE

## 2019-03-26 PROCEDURE — G8427 DOCREV CUR MEDS BY ELIG CLIN: HCPCS | Performed by: INTERNAL MEDICINE

## 2019-03-26 RX ORDER — BUDESONIDE AND FORMOTEROL FUMARATE DIHYDRATE 160; 4.5 UG/1; UG/1
2 AEROSOL RESPIRATORY (INHALATION) 2 TIMES DAILY
Qty: 1 INHALER | Refills: 0 | COMMUNITY
Start: 2019-03-26 | End: 2020-01-28 | Stop reason: SDUPTHER

## 2019-05-02 RX ORDER — METOPROLOL SUCCINATE 50 MG/1
TABLET, EXTENDED RELEASE ORAL
Qty: 90 TABLET | Refills: 1 | Status: SHIPPED | OUTPATIENT
Start: 2019-05-02 | End: 2019-11-27 | Stop reason: SDUPTHER

## 2019-05-23 ENCOUNTER — OFFICE VISIT (OUTPATIENT)
Dept: FAMILY MEDICINE CLINIC | Age: 74
End: 2019-05-23
Payer: MEDICARE

## 2019-05-23 VITALS
SYSTOLIC BLOOD PRESSURE: 138 MMHG | HEIGHT: 64 IN | DIASTOLIC BLOOD PRESSURE: 78 MMHG | WEIGHT: 224 LBS | TEMPERATURE: 98 F | BODY MASS INDEX: 38.24 KG/M2

## 2019-05-23 DIAGNOSIS — I10 ESSENTIAL HYPERTENSION: Chronic | ICD-10-CM

## 2019-05-23 DIAGNOSIS — E78.2 MIXED HYPERLIPIDEMIA: Chronic | ICD-10-CM

## 2019-05-23 DIAGNOSIS — R73.09 ELEVATED GLUCOSE: ICD-10-CM

## 2019-05-23 DIAGNOSIS — I10 ESSENTIAL HYPERTENSION: Primary | Chronic | ICD-10-CM

## 2019-05-23 PROBLEM — A41.9 SEPSIS (HCC): Status: RESOLVED | Noted: 2018-01-07 | Resolved: 2019-05-23

## 2019-05-23 LAB
ALT SERPL-CCNC: 13 U/L (ref 10–40)
ANION GAP SERPL CALCULATED.3IONS-SCNC: 16 MMOL/L (ref 3–16)
AST SERPL-CCNC: 24 U/L (ref 15–37)
BUN BLDV-MCNC: 25 MG/DL (ref 7–20)
CALCIUM SERPL-MCNC: 9.7 MG/DL (ref 8.3–10.6)
CHLORIDE BLD-SCNC: 101 MMOL/L (ref 99–110)
CHOLESTEROL, TOTAL: 155 MG/DL (ref 0–199)
CO2: 22 MMOL/L (ref 21–32)
CREAT SERPL-MCNC: 1.5 MG/DL (ref 0.6–1.2)
GFR AFRICAN AMERICAN: 41
GFR NON-AFRICAN AMERICAN: 34
GLUCOSE BLD-MCNC: 97 MG/DL (ref 70–99)
HDLC SERPL-MCNC: 41 MG/DL (ref 40–60)
LDL CHOLESTEROL CALCULATED: 90 MG/DL
POTASSIUM SERPL-SCNC: 5.2 MMOL/L (ref 3.5–5.1)
SODIUM BLD-SCNC: 139 MMOL/L (ref 136–145)
TRIGL SERPL-MCNC: 118 MG/DL (ref 0–150)
VLDLC SERPL CALC-MCNC: 24 MG/DL

## 2019-05-23 PROCEDURE — 99213 OFFICE O/P EST LOW 20 MIN: CPT | Performed by: FAMILY MEDICINE

## 2019-05-23 PROCEDURE — G8427 DOCREV CUR MEDS BY ELIG CLIN: HCPCS | Performed by: FAMILY MEDICINE

## 2019-05-23 PROCEDURE — G8417 CALC BMI ABV UP PARAM F/U: HCPCS | Performed by: FAMILY MEDICINE

## 2019-05-23 PROCEDURE — 1090F PRES/ABSN URINE INCON ASSESS: CPT | Performed by: FAMILY MEDICINE

## 2019-05-23 PROCEDURE — 1123F ACP DISCUSS/DSCN MKR DOCD: CPT | Performed by: FAMILY MEDICINE

## 2019-05-23 PROCEDURE — 4040F PNEUMOC VAC/ADMIN/RCVD: CPT | Performed by: FAMILY MEDICINE

## 2019-05-23 PROCEDURE — 3017F COLORECTAL CA SCREEN DOC REV: CPT | Performed by: FAMILY MEDICINE

## 2019-05-23 PROCEDURE — G8598 ASA/ANTIPLAT THER USED: HCPCS | Performed by: FAMILY MEDICINE

## 2019-05-23 PROCEDURE — 1036F TOBACCO NON-USER: CPT | Performed by: FAMILY MEDICINE

## 2019-05-23 PROCEDURE — G8399 PT W/DXA RESULTS DOCUMENT: HCPCS | Performed by: FAMILY MEDICINE

## 2019-05-23 NOTE — PROGRESS NOTES
Subjective:      Patient ID: Madhu Grullon is a 69 y.o. female. Patient presents with:   Follow-up: hypertension, lipids    Saw pulmonary and vascular surgery in march  Here to recheck the blood pressure after the med dose change earlier  [facility] urology in April for uti and vaginal yeast infection    Mammogram fine on 3/11/19    She is fine and no c/o  Appetite is good  She is not using the sleep machine  She does not want to use  No cp no sob  She will use the inhaler for wheeze and cough at times  No abdomen pain   bm ok no blood  The urine is fine now and no pain no blood  The vaginal yeast sx have all cleared    YOB: 1945    Date of Visit:  5/23/2019     -- Morphine -- Rash    Current Outpatient Medications:  rivaroxaban (XARELTO) 20 MG TABS tablet, Take 1 tablet by mouth daily (with breakfast), Disp: 30 tablet, Rfl: 3  metoprolol succinate (TOPROL XL) 50 MG extended release tablet, TAKE ONE TABLET BY MOUTH DAILY, Disp: 90 tablet, Rfl: 1  budesonide-formoterol (SYMBICORT) 160-4.5 MCG/ACT AERO, Inhale 2 puffs into the lungs 2 times daily, Disp: 1 Inhaler, Rfl: 0  tiotropium (SPIRIVA RESPIMAT) 2.5 MCG/ACT AERS inhaler, Inhale 2 puffs into the lungs daily, Disp: 1 Inhaler, Rfl: 1  SPIRIVA RESPIMAT 2.5 MCG/ACT AERS inhaler, INHALE TWO PUFFS BY MOUTH DAILY, Disp: 1 Inhaler, Rfl: 4  triamcinolone (KENALOG) 0.1 % cream, , Disp: , Rfl:   clobetasol (TEMOVATE) 0.05 % cream, , Disp: , Rfl:   nitroGLYCERIN (NITROSTAT) 0.4 MG SL tablet, Place 1 tablet under the tongue every 5 minutes as needed for Chest pain, Disp: 25 tablet, Rfl: 1  lisinopril (PRINIVIL;ZESTRIL) 10 MG tablet, Take 2 tablets by mouth daily, Disp: 30 tablet, Rfl: 2  isosorbide mononitrate (IMDUR) 30 MG extended release tablet, TAKE ONE TABLET BY MOUTH DAILY, Disp: 90 tablet, Rfl: 3  furosemide (LASIX) 40 MG tablet, Take 1.5 tablets by mouth 2 times daily, Disp: 90 tablet, Rfl: 3  rosuvastatin (CRESTOR) 40 MG tablet, Take 1 tablet by mouth She has no cervical adenopathy. Right: No supraclavicular adenopathy present. Left: No supraclavicular adenopathy present. Neurological: She is alert. She displays no tremor. Skin: Skin is warm, dry and intact. She is not diaphoretic. No cyanosis. No pallor. Nails show no clubbing. Assessment:        Diagnosis Orders   1. Essential hypertension  Basic Metabolic Panel    Lipid Panel   2. Elevated glucose  Basic Metabolic Panel    Hemoglobin A1C    Lipid Panel   3. Mixed hyperlipidemia  AST    ALT    Lipid Panel       Cholesterol med?    She has known mane and she does not want to use the cpap  I suggested seeing sleep doctor again and perhaps could also use O2 at night and she declined to do this       Plan:      Stay with the diet   Continue the current medications  See in 4 months        Messi Heard MD

## 2019-05-24 ENCOUNTER — TELEPHONE (OUTPATIENT)
Dept: FAMILY MEDICINE CLINIC | Age: 74
End: 2019-05-24

## 2019-05-24 DIAGNOSIS — E87.5 HIGH POTASSIUM: Primary | ICD-10-CM

## 2019-05-24 LAB
ESTIMATED AVERAGE GLUCOSE: 114 MG/DL
HBA1C MFR BLD: 5.6 %

## 2019-05-24 NOTE — TELEPHONE ENCOUNTER
k is high  She should stop the potassium pills  Her kidney test is still off  May be due to the lasix    Stop the k and repeat the bmp in a 10-14 days

## 2019-06-03 DIAGNOSIS — E87.5 HIGH POTASSIUM: ICD-10-CM

## 2019-06-03 LAB — POTASSIUM SERPL-SCNC: 4.7 MMOL/L (ref 3.5–5.1)

## 2019-07-24 ENCOUNTER — OFFICE VISIT (OUTPATIENT)
Dept: PULMONOLOGY | Age: 74
End: 2019-07-24
Payer: MEDICARE

## 2019-07-24 VITALS
WEIGHT: 265 LBS | SYSTOLIC BLOOD PRESSURE: 138 MMHG | HEIGHT: 64 IN | HEART RATE: 58 BPM | OXYGEN SATURATION: 97 % | BODY MASS INDEX: 45.24 KG/M2 | DIASTOLIC BLOOD PRESSURE: 70 MMHG | RESPIRATION RATE: 20 BRPM | TEMPERATURE: 97.5 F

## 2019-07-24 DIAGNOSIS — R06.02 SOB (SHORTNESS OF BREATH): ICD-10-CM

## 2019-07-24 DIAGNOSIS — J44.9 COPD, SEVERE (HCC): Primary | ICD-10-CM

## 2019-07-24 DIAGNOSIS — R63.5 WEIGHT GAIN: ICD-10-CM

## 2019-07-24 PROCEDURE — G8417 CALC BMI ABV UP PARAM F/U: HCPCS | Performed by: INTERNAL MEDICINE

## 2019-07-24 PROCEDURE — G8926 SPIRO NO PERF OR DOC: HCPCS | Performed by: INTERNAL MEDICINE

## 2019-07-24 PROCEDURE — 3017F COLORECTAL CA SCREEN DOC REV: CPT | Performed by: INTERNAL MEDICINE

## 2019-07-24 PROCEDURE — 4040F PNEUMOC VAC/ADMIN/RCVD: CPT | Performed by: INTERNAL MEDICINE

## 2019-07-24 PROCEDURE — G8399 PT W/DXA RESULTS DOCUMENT: HCPCS | Performed by: INTERNAL MEDICINE

## 2019-07-24 PROCEDURE — G8598 ASA/ANTIPLAT THER USED: HCPCS | Performed by: INTERNAL MEDICINE

## 2019-07-24 PROCEDURE — 1036F TOBACCO NON-USER: CPT | Performed by: INTERNAL MEDICINE

## 2019-07-24 PROCEDURE — 99214 OFFICE O/P EST MOD 30 MIN: CPT | Performed by: INTERNAL MEDICINE

## 2019-07-24 PROCEDURE — 1123F ACP DISCUSS/DSCN MKR DOCD: CPT | Performed by: INTERNAL MEDICINE

## 2019-07-24 PROCEDURE — 1090F PRES/ABSN URINE INCON ASSESS: CPT | Performed by: INTERNAL MEDICINE

## 2019-07-24 PROCEDURE — G8427 DOCREV CUR MEDS BY ELIG CLIN: HCPCS | Performed by: INTERNAL MEDICINE

## 2019-07-24 PROCEDURE — 3023F SPIROM DOC REV: CPT | Performed by: INTERNAL MEDICINE

## 2019-07-24 RX ORDER — FORMOTEROL FUMARATE 20 UG/2ML
20 SOLUTION RESPIRATORY (INHALATION) 2 TIMES DAILY
Qty: 120 ML | Refills: 3 | Status: ON HOLD | OUTPATIENT
Start: 2019-07-24 | End: 2021-07-05

## 2019-07-24 RX ORDER — BUDESONIDE AND FORMOTEROL FUMARATE DIHYDRATE 160; 4.5 UG/1; UG/1
2 AEROSOL RESPIRATORY (INHALATION) 2 TIMES DAILY
Qty: 1 INHALER | Refills: 0 | COMMUNITY
Start: 2019-07-24 | End: 2019-11-20 | Stop reason: SDUPTHER

## 2019-07-24 RX ORDER — BUDESONIDE 0.5 MG/2ML
1 INHALANT ORAL 2 TIMES DAILY
Qty: 360 ML | Refills: 3 | Status: ON HOLD | OUTPATIENT
Start: 2019-07-24 | End: 2021-07-05

## 2019-07-24 NOTE — PROGRESS NOTES
Chief complaint  This is a 69y.o. year old female  who comes to see me with a chief complaint of   Chief Complaint   Patient presents with    COPD     pt complains more sob than normal       HPI  Here with CC of COPD    She has been getting progressively more SOB over the last weeks with coughing, wheezing, and SOB. Has always under-perceived her symptoms and relied on samples of symbicort and spiriva but would never have enough. She is having more windedness with exertion. Using albuterol more and just not doing well. Is gaining weight and having kidney issues. She does not feel sick at this time.  is present . Past Medical History:   Diagnosis Date    AAA (abdominal aortic aneurysm) ([facility] [address]     pt states it is 4cm    AAA (abdominal aortic aneurysm) without rupture (HCC) 2/10/2015    Atrial fibrillation (HCC)     CAD (coronary artery disease)     CHF (congestive heart failure) ([facility] [address]     COPD (chronic obstructive pulmonary disease) (HCC)     History of blood clots     Hyperlipidemia     Hypertension        Past Surgical History:   Procedure Laterality Date    ABDOMINAL AORTIC ANEURYSM REPAIR      Endovascular abdominal AA    APPENDECTOMY  1990    incidental    BLADDER SUSPENSION      CATARACT REMOVAL      CHOLECYSTECTOMY  10/15/13    COLONOSCOPY  9/2/07    dr Torres [place] and check in 5 years.  COLONOSCOPY  06/16/2017    ok dr arndt, repeat 5 years   [address]    for benign tumor.  just the uterus    JOINT REPLACEMENT  12/2013    right knee replacement    TONSILLECTOMY  as a child    TUMOR EXCISION      benign behind right ear about 2008     Current Outpatient Medications   Medication Sig Dispense Refill    rivaroxaban (XARELTO) 20 MG TABS tablet Take 1 tablet by mouth daily (with breakfast) 30 tablet 3    metoprolol succinate (TOPROL XL) 50 MG extended release tablet TAKE ONE TABLET BY MOUTH DAILY 90 tablet 1    budesonide-formoterol (SYMBICORT) 160-4.5 MCG/ACT AERO

## 2019-08-16 ENCOUNTER — PROCEDURE VISIT (OUTPATIENT)
Dept: SURGERY | Age: 74
End: 2019-08-16
Payer: MEDICARE

## 2019-08-16 ENCOUNTER — OFFICE VISIT (OUTPATIENT)
Dept: SURGERY | Age: 74
End: 2019-08-16
Payer: MEDICARE

## 2019-08-16 VITALS
DIASTOLIC BLOOD PRESSURE: 84 MMHG | SYSTOLIC BLOOD PRESSURE: 162 MMHG | HEART RATE: 48 BPM | BODY MASS INDEX: 38.62 KG/M2 | WEIGHT: 225 LBS

## 2019-08-16 DIAGNOSIS — I71.40 AAA (ABDOMINAL AORTIC ANEURYSM) WITHOUT RUPTURE: ICD-10-CM

## 2019-08-16 DIAGNOSIS — Z95.828 HISTORY OF ENDOVASCULAR STENT GRAFT FOR ABDOMINAL AORTIC ANEURYSM (AAA): Primary | ICD-10-CM

## 2019-08-16 DIAGNOSIS — E78.2 MIXED HYPERLIPIDEMIA: ICD-10-CM

## 2019-08-16 DIAGNOSIS — I10 ESSENTIAL HYPERTENSION: ICD-10-CM

## 2019-08-16 DIAGNOSIS — I65.23 CAROTID ARTERY STENOSIS WITHOUT CEREBRAL INFARCTION, BILATERAL: ICD-10-CM

## 2019-08-16 PROCEDURE — G8399 PT W/DXA RESULTS DOCUMENT: HCPCS | Performed by: NURSE PRACTITIONER

## 2019-08-16 PROCEDURE — 1123F ACP DISCUSS/DSCN MKR DOCD: CPT | Performed by: NURSE PRACTITIONER

## 2019-08-16 PROCEDURE — 1090F PRES/ABSN URINE INCON ASSESS: CPT | Performed by: NURSE PRACTITIONER

## 2019-08-16 PROCEDURE — 4040F PNEUMOC VAC/ADMIN/RCVD: CPT | Performed by: NURSE PRACTITIONER

## 2019-08-16 PROCEDURE — 93978 VASCULAR STUDY: CPT | Performed by: SURGERY

## 2019-08-16 PROCEDURE — 99214 OFFICE O/P EST MOD 30 MIN: CPT | Performed by: NURSE PRACTITIONER

## 2019-08-16 PROCEDURE — 3017F COLORECTAL CA SCREEN DOC REV: CPT | Performed by: NURSE PRACTITIONER

## 2019-08-16 PROCEDURE — G8598 ASA/ANTIPLAT THER USED: HCPCS | Performed by: NURSE PRACTITIONER

## 2019-08-16 PROCEDURE — G8427 DOCREV CUR MEDS BY ELIG CLIN: HCPCS | Performed by: NURSE PRACTITIONER

## 2019-08-16 PROCEDURE — G8417 CALC BMI ABV UP PARAM F/U: HCPCS | Performed by: NURSE PRACTITIONER

## 2019-08-16 PROCEDURE — 1036F TOBACCO NON-USER: CPT | Performed by: NURSE PRACTITIONER

## 2019-08-16 ASSESSMENT — ENCOUNTER SYMPTOMS
ABDOMINAL PAIN: 0
BACK PAIN: 0
ABDOMINAL DISTENTION: 0

## 2019-08-16 NOTE — LETTER
[facility]  [address] [place] [id number]  Phone: [phone]  Fax: [phone]    CHRISTINE Dale CNP    August 16, 2019      MD Nori Herrera [id number]     Patient: Cristo Benedict    MR Number: [id number]    YOB: 1945    Date of Visit: 8/16/2019        Dear Carito Rich:    Leita Homans, Dr. Hendrick Horsfall patient, was in the office today following her carotid duplex scan. My assessment is as follows:    Carotid artery stenosis, w/o mention of cerebral infarction  Current plans       * The patient has a 16-49% VILMA stenosis by velocity criteria; VILMA 36% by            image. * The patient has a 95-20% LICA stenosis by velocity criteria; LICA 70% by              image. * The patient has not experienced any symptoms related to her carotid disease. * Follow up in 1 year with carotid doppler scan and office visit. I will keep you posted regarding her progress.     Sincerely,      CHRISTINE Dale CNP

## 2019-08-16 NOTE — PROGRESS NOTES
Subjective:      Patient ID: Monse Trent is a 69 y.o. female. Chief Complaint   Patient presents with    Carotid Disease     6 month follow up with AAA scan and office visit      Pain Assessment  The patient is currently not experiencing any pain at this time. RAFAEL Donaldson is a 69 y.o. female who presents with a complaint of Aneurysm follow up. The Aneurysm is located in the abdominal aorta. Patient was last scanned 2/15/19. Previous measurement was 5.18 cm per duplex scan at last evaluation. Current measurement is 5.05 cm per duplex scan. The patient has been asymptomatic since last visit. Contributing factors include hypertension and previous use of tobacco.  Previous diagnostic tests have included vascular scan and CT scan. Recent diagnostic tests include: vascular scan. Relevant prior surgery includes endovascular repair of AAA 3/21/18. .     Review of Systems   Eyes: Negative for visual disturbance. Gastrointestinal: Negative for abdominal distention and abdominal pain. Musculoskeletal: Negative for back pain. Neurological: Negative for speech difficulty, weakness and numbness. All other systems reviewed and are negative. Allergies   Allergen Reactions    Morphine Rash     Prior to Visit Medications    Medication Sig Taking?  Authorizing Provider   budesonide-formoterol (SYMBICORT) 160-4.5 MCG/ACT AERO Inhale 2 puffs into the lungs 2 times daily  [place] Dale Konmercedes, DO   tiotropium (SPIRIVA RESPIMAT) 2.5 MCG/ACT AERS inhaler Inhale 2 puffs into the lungs daily  Westley Jose, DO   budesonide (PULMICORT) 0.5 MG/2ML nebulizer suspension Take 2 mLs by nebulization 2 times daily  Westley Jose, DO   formoterol (PERFOROMIST) 20 MCG/2ML nebulizer solution Take 2 mLs by nebulization 2 times daily  Westley Jose, DO   rivaroxaban (XARELTO) 20 MG TABS tablet Take 1 tablet by mouth daily (with breakfast)  [place] Jose, DO metoprolol succinate (TOPROL XL) 50 MG extended release tablet TAKE ONE TABLET BY MOUTH DAILY  Dejuan Bray MD   budesonide-formoterol (SYMBICORT) 160-4.5 MCG/ACT AERO Inhale 2 puffs into the lungs 2 times daily  Heloise Grief, DO   tiotropium (SPIRIVA RESPIMAT) 2.5 MCG/ACT AERS inhaler Inhale 2 puffs into the lungs daily  Heloise Grief, DO   SPIRIVA RESPIMAT 2.5 MCG/ACT AERS inhaler INHALE TWO PUFFS BY MOUTH DAILY  Heloise Grief, DO   triamcinolone (KENALOG) 0.1 % cream   Historical Provider, MD   clobetasol (TEMOVATE) 0.05 % cream   Historical Provider, MD   nitroGLYCERIN (NITROSTAT) 0.4 MG SL tablet Place 1 tablet under the tongue every 5 minutes as needed for Chest pain  Beulah Mcburney, MD   lisinopril (PRINIVIL;ZESTRIL) 10 MG tablet Take 2 tablets by mouth daily  Beulah Mcburney, MD   isosorbide mononitrate (IMDUR) 30 MG extended release tablet TAKE ONE TABLET BY MOUTH DAILY  Dejuan Bray MD   furosemide (LASIX) 40 MG tablet Take 1.5 tablets by mouth 2 times daily  Viri Santiago MD   rosuvastatin (CRESTOR) 40 MG tablet Take 1 tablet by mouth daily  Viri Santiago MD   albuterol sulfate HFA (PROAIR HFA) 108 (90 Base) MCG/ACT inhaler Inhale 2 puffs into the lungs every 4 hours as needed for Wheezing or Shortness of Breath  Heloise Grief, DO   albuterol (PROVENTIL) (2.5 MG/3ML) 0.083% nebulizer solution Take 2.5 mg by nebulization 4 times daily  Historical Provider, MD   Multiple Vitamins-Minerals (MULTI FOR HER 50+ PO) Take 1 tablet by mouth daily  Historical Provider, MD     History reviewed. Objective:   Physical Exam   Constitutional: She is oriented to person, place, and time. No distress. obese   HENT:   Head: Normocephalic. Right Ear: Hearing normal.   Left Ear: Hearing normal.   Eyes: Conjunctivae and lids are normal.   Neck: Trachea normal and normal range of motion. Neck supple. Carotid bruit is present. No tracheal deviation present. Cardiovascular: Normal rate, regular rhythm and normal heart sounds. Pulses:       Femoral pulses are 2+ on the right side, and 2+ on the left side. Popliteal pulses are 2+ on the right side, and 2+ on the left side. Dorsalis pedis pulses are 2+ on the right side, and 2+ on the left side. Posterior tibial pulses are 0 on the right side, and 0 on the left side. Doppler 2/15/19:  Rt DP: Biphasic  Right PT: Monophasic (weak)    Left DP: Biphasic  Left PT: Monophasic   Pulmonary/Chest: Effort normal and breath sounds normal.   Abdominal: Soft. Normal appearance. She exhibits no mass. There is no tenderness. There is no rigidity, no rebound and no guarding. Musculoskeletal: Normal range of motion. She exhibits no edema. Neurological: She is alert and oriented to person, place, and time. Skin: Skin is warm, dry and intact. Psychiatric: Judgment normal.       Assessment:       Diagnosis      Diagnosis   1. History of endovascular stent graft for abdominal aortic aneurysm (AAA)    2. Essential hypertension - stable, on furosemide, metoprolol and lisinopril   3. Mixed hyperlipidemia - stable rosuvastatin   4. Carotid artery stenosis without cerebral infarction, bilateral      AAA scan today:  Patent endovascular stent graft for AAA repair with no evidence of endoleak. Maximum diameter of the excluded aneurysm is 5.05 x 5.05 cm. There has been a decrease in size of the excluded AAA compared to the previous study of 2/15/19. PATIENT EDUCATION focused on A&P of aneurysms and strong familial incidence. Patient was informed that smoking and high blood pressure can affect aneurysms. Smoking can thin vessels and high blood pressure results in too much pressure inside the vessel, which can potentially stretch it further. I explained that it is important to make family members aware of the influence of genetic factors in the development of an aneurysm.            Plan:         Follow up 3/16/19 for CDS and office visit  (VILMA 10%, LICA 68%)  Return in about 1 year (around 8/16/2020) for AAA scan and office visit .'

## 2019-08-21 ENCOUNTER — TELEPHONE (OUTPATIENT)
Dept: CARDIOLOGY CLINIC | Age: 74
End: 2019-08-21

## 2019-08-21 ENCOUNTER — TELEPHONE (OUTPATIENT)
Dept: PULMONOLOGY | Age: 74
End: 2019-08-21

## 2019-08-21 NOTE — TELEPHONE ENCOUNTER
Patient calling in due to medication prices. She states she takes Xarelto and that it keeps getting more and more expensive. She does not want to be on coumadin or warafrin.       Please advise

## 2019-08-21 NOTE — TELEPHONE ENCOUNTER
Anny Davenport called in this afternoon wanting to know if Dr. Emilie Duran can switch her to a different blood thinner? She states that her Xarelto is costing her to much and she can't afford it. She does not want to be switched to Coumadin.        You can reach Anny Davenport at #[phone]

## 2019-08-30 RX ORDER — LISINOPRIL 10 MG/1
TABLET ORAL
Qty: 30 TABLET | Refills: 1 | Status: SHIPPED | OUTPATIENT
Start: 2019-08-30 | End: 2019-11-11 | Stop reason: SDUPTHER

## 2019-09-23 ENCOUNTER — OFFICE VISIT (OUTPATIENT)
Dept: FAMILY MEDICINE CLINIC | Age: 74
End: 2019-09-23
Payer: MEDICARE

## 2019-09-23 VITALS
WEIGHT: 225.4 LBS | TEMPERATURE: 100 F | HEIGHT: 64 IN | DIASTOLIC BLOOD PRESSURE: 72 MMHG | SYSTOLIC BLOOD PRESSURE: 132 MMHG | BODY MASS INDEX: 38.48 KG/M2

## 2019-09-23 DIAGNOSIS — J40 BRONCHITIS: Primary | ICD-10-CM

## 2019-09-23 PROCEDURE — G8427 DOCREV CUR MEDS BY ELIG CLIN: HCPCS | Performed by: INTERNAL MEDICINE

## 2019-09-23 PROCEDURE — G8598 ASA/ANTIPLAT THER USED: HCPCS | Performed by: INTERNAL MEDICINE

## 2019-09-23 PROCEDURE — 1090F PRES/ABSN URINE INCON ASSESS: CPT | Performed by: INTERNAL MEDICINE

## 2019-09-23 PROCEDURE — G8417 CALC BMI ABV UP PARAM F/U: HCPCS | Performed by: INTERNAL MEDICINE

## 2019-09-23 PROCEDURE — 1123F ACP DISCUSS/DSCN MKR DOCD: CPT | Performed by: INTERNAL MEDICINE

## 2019-09-23 PROCEDURE — 3017F COLORECTAL CA SCREEN DOC REV: CPT | Performed by: INTERNAL MEDICINE

## 2019-09-23 PROCEDURE — 4040F PNEUMOC VAC/ADMIN/RCVD: CPT | Performed by: INTERNAL MEDICINE

## 2019-09-23 PROCEDURE — G8399 PT W/DXA RESULTS DOCUMENT: HCPCS | Performed by: INTERNAL MEDICINE

## 2019-09-23 PROCEDURE — 1036F TOBACCO NON-USER: CPT | Performed by: INTERNAL MEDICINE

## 2019-09-23 PROCEDURE — 99213 OFFICE O/P EST LOW 20 MIN: CPT | Performed by: INTERNAL MEDICINE

## 2019-09-23 RX ORDER — METHYLPREDNISOLONE 4 MG/1
TABLET ORAL
Qty: 1 KIT | Refills: 0 | Status: SHIPPED | OUTPATIENT
Start: 2019-09-23 | End: 2019-10-16 | Stop reason: ALTCHOICE

## 2019-09-23 RX ORDER — CEFUROXIME AXETIL 250 MG/1
250 TABLET ORAL 2 TIMES DAILY
Qty: 20 TABLET | Refills: 0 | Status: SHIPPED | OUTPATIENT
Start: 2019-09-23 | End: 2019-10-03

## 2019-09-23 ASSESSMENT — ENCOUNTER SYMPTOMS
ABDOMINAL PAIN: 0
CONSTIPATION: 0
BLOOD IN STOOL: 0

## 2019-09-23 NOTE — PROGRESS NOTES
nebulization 4 times daily      Multiple Vitamins-Minerals (MULTI FOR HER 50+ PO) Take 1 tablet by mouth daily       No current facility-administered medications for this visit. Allergies: Morphine  Past Medical History:   Diagnosis Date    AAA (abdominal aortic aneurysm) ([facility] [address]     pt states it is 4cm    AAA (abdominal aortic aneurysm) without rupture (HCC) 2/10/2015    Atrial fibrillation (HCC)     CAD (coronary artery disease)     CHF (congestive heart failure) ([facility] [address]     [facility]     History of blood clots     Hyperlipidemia     Hypertension      Past Surgical History:   Procedure Laterality Date    ABDOMINAL AORTIC ANEURYSM REPAIR      Endovascular abdominal AA    APPENDECTOMY      incidental    BLADDER SUSPENSION      CATARACT REMOVAL      CHOLECYSTECTOMY  10/15/13    COLONOSCOPY  07    dr Janes Floyd and check in 5 years.  COLONOSCOPY  2017    ok dr arndt, repeat 5 years   [address]    for benign tumor. just the uterus    JOINT REPLACEMENT  2013    right knee replacement    TONSILLECTOMY  as a child    TUMOR EXCISION      benign behind right ear about      Family History   Problem Relation Age of Onset    Heart Disease Father     Hypertension Other      Social History     Tobacco Use    Smoking status: Former Smoker     Packs/day: 1.00     Years: 37.00     Pack years: 37.00     Types: Cigarettes     Start date: 1976     Last attempt to quit: 2013     Years since quittin.0    Smokeless tobacco: Never Used   Substance Use Topics    Alcohol use: No       Review of Systems   Constitutional: Negative for chills, diaphoresis, fatigue and fever. HENT: Positive for congestion and postnasal drip. Respiratory:        Patient presents with:  URI: patient c/o cough (productive at times), wheezing x 2-3 days; low grade fever, chest congestion, aches.   patient denies sore throat     Cardiovascular: lungs daily 1 Inhaler 1    SPIRIVA RESPIMAT 2.5 MCG/ACT AERS inhaler INHALE TWO PUFFS BY MOUTH DAILY 1 Inhaler 4    triamcinolone (KENALOG) 0.1 % cream       clobetasol (TEMOVATE) 0.05 % cream       nitroGLYCERIN (NITROSTAT) 0.4 MG SL tablet Place 1 tablet under the tongue every 5 minutes as needed for Chest pain 25 tablet 1    lisinopril (PRINIVIL;ZESTRIL) 10 MG tablet Take 2 tablets by mouth daily 30 tablet 2    isosorbide mononitrate (IMDUR) 30 MG extended release tablet TAKE ONE TABLET BY MOUTH DAILY 90 tablet 3    furosemide (LASIX) 40 MG tablet Take 1.5 tablets by mouth 2 times daily 90 tablet 3    rosuvastatin (CRESTOR) 40 MG tablet Take 1 tablet by mouth daily 90 tablet 3    albuterol sulfate HFA (PROAIR HFA) 108 (90 Base) MCG/ACT inhaler Inhale 2 puffs into the lungs every 4 hours as needed for Wheezing or Shortness of Breath 1 Inhaler 5    albuterol (PROVENTIL) (2.5 MG/3ML) 0.083% nebulizer solution Take 2.5 mg by nebulization 4 times daily      Multiple Vitamins-Minerals (MULTI FOR HER 50+ PO) Take 1 tablet by mouth daily       No facility-administered encounter medications on file as of 9/23/2019. No orders of the defined types were placed in this encounter.           Jesi BENDER DO

## 2019-10-16 ENCOUNTER — OFFICE VISIT (OUTPATIENT)
Dept: PULMONOLOGY | Age: 74
End: 2019-10-16
Payer: MEDICARE

## 2019-10-16 VITALS
HEART RATE: 54 BPM | DIASTOLIC BLOOD PRESSURE: 80 MMHG | BODY MASS INDEX: 38.07 KG/M2 | WEIGHT: 223 LBS | SYSTOLIC BLOOD PRESSURE: 144 MMHG | HEIGHT: 64 IN | TEMPERATURE: 97.1 F | RESPIRATION RATE: 16 BRPM | OXYGEN SATURATION: 96 %

## 2019-10-16 DIAGNOSIS — R63.5 WEIGHT GAIN: ICD-10-CM

## 2019-10-16 DIAGNOSIS — Z87.891 PERSONAL HISTORY OF TOBACCO USE: ICD-10-CM

## 2019-10-16 DIAGNOSIS — J44.9 COPD, SEVERE (HCC): Primary | ICD-10-CM

## 2019-10-16 PROCEDURE — 1123F ACP DISCUSS/DSCN MKR DOCD: CPT | Performed by: INTERNAL MEDICINE

## 2019-10-16 PROCEDURE — G8399 PT W/DXA RESULTS DOCUMENT: HCPCS | Performed by: INTERNAL MEDICINE

## 2019-10-16 PROCEDURE — 1036F TOBACCO NON-USER: CPT | Performed by: INTERNAL MEDICINE

## 2019-10-16 PROCEDURE — G8598 ASA/ANTIPLAT THER USED: HCPCS | Performed by: INTERNAL MEDICINE

## 2019-10-16 PROCEDURE — G8926 SPIRO NO PERF OR DOC: HCPCS | Performed by: INTERNAL MEDICINE

## 2019-10-16 PROCEDURE — 1090F PRES/ABSN URINE INCON ASSESS: CPT | Performed by: INTERNAL MEDICINE

## 2019-10-16 PROCEDURE — 4040F PNEUMOC VAC/ADMIN/RCVD: CPT | Performed by: INTERNAL MEDICINE

## 2019-10-16 PROCEDURE — 99214 OFFICE O/P EST MOD 30 MIN: CPT | Performed by: INTERNAL MEDICINE

## 2019-10-16 PROCEDURE — G8427 DOCREV CUR MEDS BY ELIG CLIN: HCPCS | Performed by: INTERNAL MEDICINE

## 2019-10-16 PROCEDURE — G8484 FLU IMMUNIZE NO ADMIN: HCPCS | Performed by: INTERNAL MEDICINE

## 2019-10-16 PROCEDURE — G8417 CALC BMI ABV UP PARAM F/U: HCPCS | Performed by: INTERNAL MEDICINE

## 2019-10-16 PROCEDURE — 3023F SPIROM DOC REV: CPT | Performed by: INTERNAL MEDICINE

## 2019-10-16 PROCEDURE — 3017F COLORECTAL CA SCREEN DOC REV: CPT | Performed by: INTERNAL MEDICINE

## 2019-10-16 RX ORDER — ALBUTEROL SULFATE 90 UG/1
2 AEROSOL, METERED RESPIRATORY (INHALATION) EVERY 4 HOURS PRN
Qty: 1 INHALER | Refills: 11 | Status: SHIPPED | OUTPATIENT
Start: 2019-10-16 | End: 2020-07-23 | Stop reason: SDUPTHER

## 2019-10-28 ENCOUNTER — HOSPITAL ENCOUNTER (OUTPATIENT)
Dept: CT IMAGING | Age: 74
Discharge: HOME | End: 2019-10-28
Payer: MEDICARE

## 2019-10-28 DIAGNOSIS — Z87.891 PERSONAL HISTORY OF TOBACCO USE: ICD-10-CM

## 2019-10-28 PROCEDURE — G0297 LDCT FOR LUNG CA SCREEN: HCPCS

## 2019-11-12 RX ORDER — LISINOPRIL 10 MG/1
TABLET ORAL
Qty: 30 TABLET | Refills: 5 | Status: ON HOLD
Start: 2019-11-12 | End: 2020-02-29 | Stop reason: HOSPADM

## 2019-11-20 ENCOUNTER — OFFICE VISIT (OUTPATIENT)
Dept: CARDIOLOGY CLINIC | Age: 74
End: 2019-11-20
Payer: MEDICARE

## 2019-11-20 VITALS
OXYGEN SATURATION: 97 % | HEART RATE: 60 BPM | WEIGHT: 224 LBS | HEIGHT: 64 IN | SYSTOLIC BLOOD PRESSURE: 120 MMHG | BODY MASS INDEX: 38.24 KG/M2 | DIASTOLIC BLOOD PRESSURE: 80 MMHG

## 2019-11-20 DIAGNOSIS — I71.40 AAA (ABDOMINAL AORTIC ANEURYSM) WITHOUT RUPTURE: Chronic | ICD-10-CM

## 2019-11-20 DIAGNOSIS — I50.32 CHRONIC DIASTOLIC HF (HEART FAILURE) (HCC): ICD-10-CM

## 2019-11-20 DIAGNOSIS — I48.0 PAROXYSMAL ATRIAL FIBRILLATION (HCC): ICD-10-CM

## 2019-11-20 DIAGNOSIS — I25.10 CORONARY ARTERY DISEASE INVOLVING NATIVE CORONARY ARTERY OF NATIVE HEART WITHOUT ANGINA PECTORIS: ICD-10-CM

## 2019-11-20 DIAGNOSIS — I10 ESSENTIAL HYPERTENSION: ICD-10-CM

## 2019-11-20 DIAGNOSIS — R06.02 SOB (SHORTNESS OF BREATH): Primary | ICD-10-CM

## 2019-11-20 DIAGNOSIS — I65.23 CAROTID ARTERY STENOSIS WITHOUT CEREBRAL INFARCTION, BILATERAL: ICD-10-CM

## 2019-11-20 PROCEDURE — 3017F COLORECTAL CA SCREEN DOC REV: CPT | Performed by: NURSE PRACTITIONER

## 2019-11-20 PROCEDURE — 1123F ACP DISCUSS/DSCN MKR DOCD: CPT | Performed by: NURSE PRACTITIONER

## 2019-11-20 PROCEDURE — G8427 DOCREV CUR MEDS BY ELIG CLIN: HCPCS | Performed by: NURSE PRACTITIONER

## 2019-11-20 PROCEDURE — 99214 OFFICE O/P EST MOD 30 MIN: CPT | Performed by: NURSE PRACTITIONER

## 2019-11-20 PROCEDURE — G8598 ASA/ANTIPLAT THER USED: HCPCS | Performed by: NURSE PRACTITIONER

## 2019-11-20 PROCEDURE — 93000 ELECTROCARDIOGRAM COMPLETE: CPT | Performed by: NURSE PRACTITIONER

## 2019-11-20 PROCEDURE — G8417 CALC BMI ABV UP PARAM F/U: HCPCS | Performed by: NURSE PRACTITIONER

## 2019-11-20 PROCEDURE — 4040F PNEUMOC VAC/ADMIN/RCVD: CPT | Performed by: NURSE PRACTITIONER

## 2019-11-20 PROCEDURE — 1090F PRES/ABSN URINE INCON ASSESS: CPT | Performed by: NURSE PRACTITIONER

## 2019-11-20 PROCEDURE — G8399 PT W/DXA RESULTS DOCUMENT: HCPCS | Performed by: NURSE PRACTITIONER

## 2019-11-20 PROCEDURE — G8484 FLU IMMUNIZE NO ADMIN: HCPCS | Performed by: NURSE PRACTITIONER

## 2019-11-20 PROCEDURE — 1036F TOBACCO NON-USER: CPT | Performed by: NURSE PRACTITIONER

## 2019-11-20 RX ORDER — FUROSEMIDE 40 MG/1
40 TABLET ORAL DAILY
Qty: 90 TABLET | Refills: 3 | Status: ON HOLD
Start: 2019-11-20 | End: 2020-02-29 | Stop reason: HOSPADM

## 2019-11-27 RX ORDER — METOPROLOL SUCCINATE 50 MG/1
TABLET, EXTENDED RELEASE ORAL
Qty: 90 TABLET | Refills: 3 | Status: SHIPPED | OUTPATIENT
Start: 2019-11-27 | End: 2020-09-14 | Stop reason: ALTCHOICE

## 2019-12-05 DIAGNOSIS — R06.02 SOB (SHORTNESS OF BREATH): ICD-10-CM

## 2019-12-05 DIAGNOSIS — I48.0 PAROXYSMAL ATRIAL FIBRILLATION (HCC): ICD-10-CM

## 2019-12-05 LAB
ANION GAP SERPL CALCULATED.3IONS-SCNC: 14 MMOL/L (ref 3–16)
BUN BLDV-MCNC: 22 MG/DL (ref 7–20)
CALCIUM SERPL-MCNC: 9.7 MG/DL (ref 8.3–10.6)
CHLORIDE BLD-SCNC: 98 MMOL/L (ref 99–110)
CO2: 26 MMOL/L (ref 21–32)
CREAT SERPL-MCNC: 1.6 MG/DL (ref 0.6–1.2)
GFR AFRICAN AMERICAN: 38
GFR NON-AFRICAN AMERICAN: 32
GLUCOSE BLD-MCNC: 79 MG/DL (ref 70–99)
POTASSIUM SERPL-SCNC: 5 MMOL/L (ref 3.5–5.1)
SODIUM BLD-SCNC: 138 MMOL/L (ref 136–145)
TSH SERPL DL<=0.05 MIU/L-ACNC: 0.66 UIU/ML (ref 0.27–4.2)

## 2019-12-09 DIAGNOSIS — I50.32 CHRONIC DIASTOLIC HF (HEART FAILURE) (HCC): Primary | ICD-10-CM

## 2020-01-02 ENCOUNTER — HOSPITAL ENCOUNTER (OUTPATIENT)
Dept: NON INVASIVE DIAGNOSTICS | Age: 75
Discharge: HOME | End: 2020-01-02
Payer: MEDICARE

## 2020-01-02 DIAGNOSIS — R06.02 SOB (SHORTNESS OF BREATH): ICD-10-CM

## 2020-01-02 DIAGNOSIS — I50.32 CHRONIC DIASTOLIC HF (HEART FAILURE) (HCC): ICD-10-CM

## 2020-01-02 LAB
ANION GAP SERPL CALCULATED.3IONS-SCNC: 16 MMOL/L (ref 3–16)
BUN BLDV-MCNC: 22 MG/DL (ref 7–20)
CALCIUM SERPL-MCNC: 9.2 MG/DL (ref 8.3–10.6)
CHLORIDE BLD-SCNC: 101 MMOL/L (ref 99–110)
CO2: 24 MMOL/L (ref 21–32)
CREAT SERPL-MCNC: 1.6 MG/DL (ref 0.6–1.2)
GFR AFRICAN AMERICAN: 38
GFR NON-AFRICAN AMERICAN: 32
GLUCOSE BLD-MCNC: 91 MG/DL (ref 70–99)
LV EF: 55 %
LVEF MODALITY: NORMAL
POTASSIUM SERPL-SCNC: 4.9 MMOL/L (ref 3.5–5.1)
PRO-BNP: 7859 PG/ML (ref 0–124)
SODIUM BLD-SCNC: 141 MMOL/L (ref 136–145)

## 2020-01-02 PROCEDURE — C8923 2D TTE W OR W/O FOL W/CON,CO: HCPCS

## 2020-01-03 ENCOUNTER — OFFICE VISIT (OUTPATIENT)
Dept: CARDIOLOGY CLINIC | Age: 75
End: 2020-01-03
Payer: MEDICARE

## 2020-01-03 VITALS
HEIGHT: 64 IN | BODY MASS INDEX: 37.39 KG/M2 | HEART RATE: 67 BPM | OXYGEN SATURATION: 97 % | WEIGHT: 219 LBS | SYSTOLIC BLOOD PRESSURE: 130 MMHG | DIASTOLIC BLOOD PRESSURE: 70 MMHG

## 2020-01-03 PROBLEM — E66.01 MORBIDLY OBESE (HCC): Status: ACTIVE | Noted: 2020-01-03

## 2020-01-03 PROBLEM — I25.118 ATHEROSCLEROTIC HEART DISEASE OF NATIVE CORONARY ARTERY WITH OTHER FORMS OF ANGINA PECTORIS (HCC): Status: ACTIVE | Noted: 2020-01-03

## 2020-01-03 PROCEDURE — G8484 FLU IMMUNIZE NO ADMIN: HCPCS | Performed by: INTERNAL MEDICINE

## 2020-01-03 PROCEDURE — G8399 PT W/DXA RESULTS DOCUMENT: HCPCS | Performed by: INTERNAL MEDICINE

## 2020-01-03 PROCEDURE — G8427 DOCREV CUR MEDS BY ELIG CLIN: HCPCS | Performed by: INTERNAL MEDICINE

## 2020-01-03 PROCEDURE — 3017F COLORECTAL CA SCREEN DOC REV: CPT | Performed by: INTERNAL MEDICINE

## 2020-01-03 PROCEDURE — 99214 OFFICE O/P EST MOD 30 MIN: CPT | Performed by: INTERNAL MEDICINE

## 2020-01-03 PROCEDURE — 4040F PNEUMOC VAC/ADMIN/RCVD: CPT | Performed by: INTERNAL MEDICINE

## 2020-01-03 PROCEDURE — 1036F TOBACCO NON-USER: CPT | Performed by: INTERNAL MEDICINE

## 2020-01-03 PROCEDURE — 1090F PRES/ABSN URINE INCON ASSESS: CPT | Performed by: INTERNAL MEDICINE

## 2020-01-03 PROCEDURE — G8417 CALC BMI ABV UP PARAM F/U: HCPCS | Performed by: INTERNAL MEDICINE

## 2020-01-03 PROCEDURE — 1123F ACP DISCUSS/DSCN MKR DOCD: CPT | Performed by: INTERNAL MEDICINE

## 2020-01-03 NOTE — PROGRESS NOTES
[facility]  Office Visit    Douglas Shea  1945    January 3, 2020    CC: \"I am having shortness of breath. \"    HPI:  The patient is 69 y.o. female with a past medical history significant for CAD, atrial fib, HTN, aortic aneurysm and COPD who is here for follow up. Admitted 1/7/2018-1/12/2018  With SOB and chest pain and dx with acute on chronic diastolic HF (diuresed), and atrial fib. Troponins elevated, testing pended due to sepsis from the flu. Outpatient stress testing positive and she underwent cardiac cath on 3/2/18 which revealed  of the RCA and nonobstructive CAD of the LAD and LCX. She also underwent endovascular AAA repair on 3/21/18 by Dr. Prosper Khan. Today, she is here for increased shortness of breath. She had a recent echo as well as blood tests which is indicative of heart failure she admits that she does not take her diuretic. She reports that shortness of breath increases with activity. She does have occasional swelling in her feet. Review of Systems:  Constitutional: Denies falls, night sweats or fever. Fatigue improved. HEENT: Denies new visual changes, ringing in ears, nosebleeds, nasal congestion  Respiratory: + SOB (chronic but with improvement)  Cardiovascular: see HPI  GI: Denies N/V, diarrhea, constipation, abdominal pain, change in bowel habits, melena or hematochezia  : Denies urinary frequency, urgency, incontinence, hematuria or dysuria. Skin: Denies rash, hives, or cyanosis  Musculoskeletal: Denies joint or muscle aches/pain  Neurological: Denies syncope or TIA-like symptoms.   Psychiatric: Denies anxiety or depression  + insomnia     Past Medical History:   Diagnosis Date    AAA [facility]abdominal aortic aneurysm) ([facility] [address]     pt states it is 4cm    AAA (abdominal aortic aneurysm) without rupture (HCC) 2/10/2015    Atrial fibrillation (HCC)     CAD (coronary artery disease)     CHF (congestive heart failure) (HCC)     COPD (chronic obstructive pulmonary disease) ([facility] [address]     History of blood clots     Hyperlipidemia     Hypertension      Past Surgical History:   Procedure Laterality Date    ABDOMINAL AORTIC ANEURYSM REPAIR      Endovascular abdominal AA    APPENDECTOMY      incidental    BLADDER SUSPENSION      CATARACT REMOVAL      CHOLECYSTECTOMY  10/15/13    COLONOSCOPY  07    dr Dee Cade and check in 5 years.  COLONOSCOPY  2017    ok dr arndt, repeat 5 years   [address]    for benign tumor.  just the uterus    JOINT REPLACEMENT  2013    right knee replacement    TONSILLECTOMY  as a child    TUMOR EXCISION      benign behind right ear about      Family History   Problem Relation Age of Onset    Heart Disease Father     Hypertension Other      Social History     Tobacco Use    Smoking status: Former Smoker     Packs/day: 1.00     Years: 37.00     Pack years: 37.00     Types: Cigarettes     Start date: 1976     Last attempt to quit: 2013     Years since quittin.2    Smokeless tobacco: Never Used   Substance Use Topics    Alcohol use: No    Drug use: No       Allergies   Allergen Reactions    Morphine Rash     Current Outpatient Medications   Medication Sig Dispense Refill    rivaroxaban (XARELTO) 20 MG TABS tablet Take 1 tablet by mouth daily (with breakfast) 30 tablet 3    metoprolol succinate (TOPROL XL) 50 MG extended release tablet TAKE ONE TABLET BY MOUTH DAILY 90 tablet 3    furosemide (LASIX) 40 MG tablet Take 1 tablet by mouth daily 90 tablet 3    lisinopril (PRINIVIL;ZESTRIL) 10 MG tablet TAKE ONE TABLET BY MOUTH DAILY 30 tablet 5    albuterol sulfate HFA (PROAIR HFA) 108 (90 Base) MCG/ACT inhaler Inhale 2 puffs into the lungs every 4 hours as needed for Wheezing or Shortness of Breath 1 Inhaler 11    tiotropium (SPIRIVA RESPIMAT) 2.5 MCG/ACT AERS inhaler Inhale 2 puffs into the lungs daily 1 Inhaler 0    budesonide (PULMICORT) 0.5 MG/2ML nebulizer suspension Take hepatomegaly  Extremities: No edema, cyanosis, or clubbing. Pulses are 2+ radial/DP/PT bilaterally. Cap refill brisk. Neurological: No focal deficit. Skin: Skin is warm and dry. Psychiatric: She has a normal mood and affect. Her speech is normal and behavior is normal.     Lab Review:   Lab Results   Component Value Date    TRIG 118 2019    HDL 41 2019    HDL 38 2011    LDLCALC 90 2019    LDLDIRECT 111 2012    LABVLDL 24 2019     Lab Results   Component Value Date     2020    K 4.9 2020    K 4.1 2018     2020    CO2 24 2020    BUN 22 2020    CREATININE 1.6 2020    GLUCOSE 91 2020    GLUCOSE 102 2016    CALCIUM 9.2 2020      Lab Results   Component Value Date    WBC 10.2 2019    HGB 13.2 2019    HCT 40.5 2019    MCV 86.9 2019     2019       ECG 2018: Sinus rhythm with old anterior infarct  EC18: Sinus Rhythm, PACs, Old anterior infarct. 19 Sinus Rhythm    ECHO 20  There is moderate concentric left ventricular hypertrophy. Patient appears to be in atrial fibrillation. Ejection fraction is estimated to be 50-60%. Indeterminate diastolic function. Mild aortic regurgitation. Mild tricuspid regurgitation. Mild mitral regurgitation with mitral annular calcification    Cardiac Cath 3/5/18  OVERALL IMPRESSION  1. Again, 100% proximal right coronary artery occlusion with left to right  collaterals. 2.  Mild disease of the distal left main trunk. 3.  20% to 30% ostial left anterior descending artery stenosis with  collaterals from LAD to the right coronary artery. 4.  30% to 40% stenosis of the proximal circumflex artery. 5.  Normal left ventricular systolic function with estimated EF of 55% to  60%.   6.  Slightly enlarged aortic root with no evidence of aortic stenosis or  regurgitation.     In view of the above findings, we will okay the

## 2020-01-20 ENCOUNTER — TELEPHONE (OUTPATIENT)
Dept: FAMILY MEDICINE CLINIC | Age: 75
End: 2020-01-20

## 2020-01-27 ENCOUNTER — OFFICE VISIT (OUTPATIENT)
Dept: FAMILY MEDICINE CLINIC | Age: 75
End: 2020-01-27
Payer: MEDICARE

## 2020-01-27 ENCOUNTER — HOSPITAL ENCOUNTER (OUTPATIENT)
Dept: GENERAL RADIOLOGY | Age: 75
Discharge: HOME | End: 2020-01-27
Payer: MEDICARE

## 2020-01-27 ENCOUNTER — HOSPITAL ENCOUNTER (OUTPATIENT)
Age: 75
Discharge: HOME | End: 2020-01-27
Payer: MEDICARE

## 2020-01-27 VITALS
TEMPERATURE: 97.8 F | HEIGHT: 64 IN | WEIGHT: 215 LBS | SYSTOLIC BLOOD PRESSURE: 136 MMHG | DIASTOLIC BLOOD PRESSURE: 80 MMHG | OXYGEN SATURATION: 97 % | BODY MASS INDEX: 36.7 KG/M2

## 2020-01-27 PROCEDURE — 99213 OFFICE O/P EST LOW 20 MIN: CPT | Performed by: FAMILY MEDICINE

## 2020-01-27 PROCEDURE — 71046 X-RAY EXAM CHEST 2 VIEWS: CPT

## 2020-01-27 PROCEDURE — G8427 DOCREV CUR MEDS BY ELIG CLIN: HCPCS | Performed by: FAMILY MEDICINE

## 2020-01-27 PROCEDURE — 1090F PRES/ABSN URINE INCON ASSESS: CPT | Performed by: FAMILY MEDICINE

## 2020-01-27 PROCEDURE — 1123F ACP DISCUSS/DSCN MKR DOCD: CPT | Performed by: FAMILY MEDICINE

## 2020-01-27 PROCEDURE — G8417 CALC BMI ABV UP PARAM F/U: HCPCS | Performed by: FAMILY MEDICINE

## 2020-01-27 PROCEDURE — 3017F COLORECTAL CA SCREEN DOC REV: CPT | Performed by: FAMILY MEDICINE

## 2020-01-27 PROCEDURE — 1036F TOBACCO NON-USER: CPT | Performed by: FAMILY MEDICINE

## 2020-01-27 PROCEDURE — G8484 FLU IMMUNIZE NO ADMIN: HCPCS | Performed by: FAMILY MEDICINE

## 2020-01-27 PROCEDURE — G8399 PT W/DXA RESULTS DOCUMENT: HCPCS | Performed by: FAMILY MEDICINE

## 2020-01-27 PROCEDURE — 4040F PNEUMOC VAC/ADMIN/RCVD: CPT | Performed by: FAMILY MEDICINE

## 2020-01-27 RX ORDER — AMOXICILLIN AND CLAVULANATE POTASSIUM 875; 125 MG/1; MG/1
1 TABLET, FILM COATED ORAL 2 TIMES DAILY
Qty: 20 TABLET | Refills: 0 | Status: SHIPPED | OUTPATIENT
Start: 2020-01-27 | End: 2020-02-06

## 2020-01-27 RX ORDER — PREDNISONE 10 MG/1
TABLET ORAL
Qty: 16 TABLET | Refills: 0 | Status: SHIPPED | OUTPATIENT
Start: 2020-01-27 | End: 2020-02-05 | Stop reason: ALTCHOICE

## 2020-01-27 ASSESSMENT — PATIENT HEALTH QUESTIONNAIRE - PHQ9
SUM OF ALL RESPONSES TO PHQ QUESTIONS 1-9: 0
SUM OF ALL RESPONSES TO PHQ9 QUESTIONS 1 & 2: 0
1. LITTLE INTEREST OR PLEASURE IN DOING THINGS: 0
SUM OF ALL RESPONSES TO PHQ QUESTIONS 1-9: 0
2. FEELING DOWN, DEPRESSED OR HOPELESS: 0

## 2020-01-27 NOTE — PROGRESS NOTES
Routed to , do you want to see pt first Subjective:      Patient ID: Monika Abernathy is a 69 y.o. female.     Patient presents with:  Congestion: cough - clear phlegm, chest congestion x 3-4 days    Cough congestion and productive  No fever  Some chill  No sore throat   Her ears hurt  She has some chronic sob and seeing cardiology for same    YOB: 1945    Date of Visit:  1/27/2020     -- Morphine -- Rash    Current Outpatient Medications:  rivaroxaban (XARELTO) 20 MG TABS tablet, Take 1 tablet by mouth daily (with breakfast), Disp: 30 tablet, Rfl: 3  metoprolol succinate (TOPROL XL) 50 MG extended release tablet, TAKE ONE TABLET BY MOUTH DAILY, Disp: 90 tablet, Rfl: 3  furosemide (LASIX) 40 MG tablet, Take 1 tablet by mouth daily, Disp: 90 tablet, Rfl: 3  lisinopril (PRINIVIL;ZESTRIL) 10 MG tablet, TAKE ONE TABLET BY MOUTH DAILY, Disp: 30 tablet, Rfl: 5  albuterol sulfate HFA (PROAIR HFA) 108 (90 Base) MCG/ACT inhaler, Inhale 2 puffs into the lungs every 4 hours as needed for Wheezing or Shortness of Breath, Disp: 1 Inhaler, Rfl: 11  tiotropium (SPIRIVA RESPIMAT) 2.5 MCG/ACT AERS inhaler, Inhale 2 puffs into the lungs daily, Disp: 1 Inhaler, Rfl: 0  budesonide (PULMICORT) 0.5 MG/2ML nebulizer suspension, Take 2 mLs by nebulization 2 times daily, Disp: 360 mL, Rfl: 3  formoterol (PERFOROMIST) 20 MCG/2ML nebulizer solution, Take 2 mLs by nebulization 2 times daily, Disp: 120 mL, Rfl: 3  budesonide-formoterol (SYMBICORT) 160-4.5 MCG/ACT AERO, Inhale 2 puffs into the lungs 2 times daily, Disp: 1 Inhaler, Rfl: 0  tiotropium (SPIRIVA RESPIMAT) 2.5 MCG/ACT AERS inhaler, Inhale 2 puffs into the lungs daily, Disp: 1 Inhaler, Rfl: 1  SPIRIVA RESPIMAT 2.5 MCG/ACT AERS inhaler, INHALE TWO PUFFS BY MOUTH DAILY, Disp: 1 Inhaler, Rfl: 4  triamcinolone (KENALOG) 0.1 % cream, , Disp: , Rfl:   clobetasol (TEMOVATE) 0.05 % cream, , Disp: , Rfl:   nitroGLYCERIN (NITROSTAT) 0.4 MG SL tablet, Place 1 tablet under the tongue every 5 minutes as needed for Chest pain, Disp: 25 tablet, Rfl: 1  isosorbide mononitrate (IMDUR) 30 MG extended release tablet, TAKE ONE TABLET BY MOUTH DAILY, Disp: 90 tablet, Rfl: 3  rosuvastatin (CRESTOR) 40 MG tablet, Take 1 tablet by mouth daily, Disp: 90 tablet, Rfl: 3  albuterol (PROVENTIL) (2.5 MG/3ML) 0.083% nebulizer solution, Take 2.5 mg by nebulization 4 times daily, Disp: , Rfl:   Multiple Vitamins-Minerals (MULTI FOR HER 50+ PO), Take 1 tablet by mouth daily, Disp: , Rfl:     No current facility-administered medications for this visit.     ---------------------------               01/27/20                      1026         ---------------------------   BP:          136/80         Site:    Left Upper Arm     Position:     Sitting        Cuff Size:   Large Adult      Temp:   97.8 °F (36.6 °C)   TempSrc:      Oral          SpO2:          97%          Weight: 215 lb (97.5 kg)    Height:  5' 4\" (1.626 m)   ---------------------------  Wt Readings from Last 3 Encounters:  01/27/20 : 215 lb (97.5 kg)  01/03/20 : 219 lb (99.3 kg)  11/20/19 : 224 lb (101.6 kg)    BP Readings from Last 3 Encounters:  01/27/20 : 136/80  01/03/20 : 130/70  11/20/19 : 120/80        Review of Systems    Objective:   Physical Exam  Constitutional:       General: She is not in acute distress. Appearance: She is well-developed. She is not diaphoretic. HENT:      Head: Normocephalic and atraumatic. Right Ear: Tympanic membrane and ear canal normal.      Left Ear: Tympanic membrane and ear canal normal.      Nose: Nose normal.      Mouth/Throat:      Mouth: No oral lesions. Pharynx: Uvula midline. Neck:      Musculoskeletal: Neck supple. Pulmonary:      Effort: Pulmonary effort is normal. No tachypnea, accessory muscle usage or respiratory distress. Breath sounds: Wheezing present. No decreased breath sounds, rhonchi or rales.       Comments: wheezing in the posterior lungs  Lymphadenopathy:      Head: Right side of head: No submental or submandibular adenopathy. Left side of head: No submental or submandibular adenopathy. Cervical: No cervical adenopathy. Upper Body:      Right upper body: No supraclavicular adenopathy. Left upper body: No supraclavicular adenopathy. Skin:     General: Skin is warm and dry. Coloration: Skin is not pale. Neurological:      Mental Status: She is alert. Assessment:       Diagnosis Orders   1. Acute upper respiratory infection, unspecified  XR CHEST STANDARD (2 VW)    SD NONINVASV OXYGEN SATUR;SINGLE           Plan:      A chest xray has been ordered do get that done  If worse go to the ER      Xray ok she was called and additional meds sent in for the bronchitis  Orders Placed This Encounter   Medications    amoxicillin-clavulanate (AUGMENTIN) 875-125 MG per tablet     Sig: Take 1 tablet by mouth 2 times daily for 10 days     Dispense:  20 tablet     Refill:  0    predniSONE (DELTASONE) 10 MG tablet     Sig: Prednisone 10 mg. #16. Take 2 po bid for 2 days, then 1 po bid for 3 days,  then 1 po daily for 2 days.  Then stop     Dispense:  16 tablet     Refill:  0       Radha Maravilla MD

## 2020-01-28 RX ORDER — BUDESONIDE AND FORMOTEROL FUMARATE DIHYDRATE 160; 4.5 UG/1; UG/1
2 AEROSOL RESPIRATORY (INHALATION) 2 TIMES DAILY
Qty: 1 INHALER | Refills: 0 | Status: SHIPPED | OUTPATIENT
Start: 2020-01-28 | End: 2020-02-24

## 2020-02-04 NOTE — PROGRESS NOTES
[facility]  [facility]    Jasmin Wilkerson  1945    February 5, 2020    CC:   Chief Complaint   Patient presents with    Follow-up     CAD, AAA, HTN, PVD, DVT, AFIB/ no chest pains or edema    Shortness of Breath     with activity - just getting over bronchitis also      HPI:  The patient is 77 y.o. female with a past medical history significant for CAD, atrial fib, HTN, abdominal aortic aneurysm (endovascular repair in 3/2018), chronic diastolic HF, sleep apnea and COPD who is here for follow up. She was hospitalized in early 2018 with SOB and chest pain and dx with acute on chonic diastolic HF and atrial fib. Her troponins were elevated, however given her sepsis, further cardiac evaluation was suspended until she was over the flu. She had outpatient stress testing which was positive for ischemia and underwent cardiac cath in 3/208 revealed  of the RCA and nonobstructive disease of the LAD and LCX. She also underwent endovascular AAA repair on 3/12/2018 (Dr. Karyle Keep). She was last seen in 1/2020 by Dr. Estefania Peraza and noted to be having increased SOB and medications compliance was emphasized. She is here for follow up today of her SOB. She had CXR performed on 1/27/2020 and Rx'ed for bronchitis per pulmonary. Overall feeling much better. Continues to use Lasix as needed (she does not take daily). Taking on average every other day. Currently on antibiotics and using nebulizer and has had cough productive of white sputum. Recently obtained stationary bike and has been trying to use a little each day. Has chronic SOBOE which has improved some since antibiotics. Chronically sleeps elevated. Denies chest pain/discomfort, SOB, PND, palpitations, dizziness, syncope, edema or claudication. Had been on prednisone and gained weight (Rx has been completed). Monitors sodium and fluid intake.  Occasionally uses e-cig    Review of Systems:  Constitutional: Denies  fatigue, weakness, night sweats or fever. HEENT: Denies new visual changes, ringing in ears, nosebleeds,nasal congestion  Respiratory: + SOBOE chronic; + productive cough  Cardiovascular: see HPI  GI: Denies N/V, diarrhea, constipation, abdominal pain, change in bowel habits, melena or hematochezia  : + urinary incontinence  Skin: Denies rash, hives, or cyanosis  Musculoskeletal: Denies joint or muscle aches/pain  Neurological: Denies syncope or TIA-like symptoms. Psychiatric: Denies anxiety, insomnia or depression     Past Medical History:   Diagnosis Date    AAA (abdominal aortic aneurysm) ([facility]     pt states it is 4cm    AAA (abdominal aortic aneurysm) without rupture (HCC) 2/10/2015    Atrial fibrillation (HCC)     CAD (coronary artery disease)     CHF (congestive heart failure) ([facility]     COPD [facility]ch[facility] obstructive [facility]     History of blood clots     Hyperlipidemia     Hypertension      Past Surgical History:   Procedure Laterality Date    ABDOMINAL AORTIC ANEURYSM REPAIR      Endovascular abdominal AA    APPENDECTOMY      incidental    BLADDER SUSPENSION      CATARACT REMOVAL      CHOLECYSTECTOMY  10/15/13    COLONOSCOPY  07    dr Shannan Traore and check in 5 years.  COLONOSCOPY  2017    ok dr arndt, repeat 5 years   [address]    for benign tumor.  just the uterus    JOINT REPLACEMENT  2013    right knee replacement    TONSILLECTOMY  as a child    TUMOR EXCISION      benign behind right ear about      Family History   Problem Relation Age of Onset    Heart Disease Father     Hypertension Other      Social History     Tobacco Use    Smoking status: Former Smoker     Packs/day: 1.00     Years: 37.00     Pack years: 37.00     Types: Cigarettes     Start date: 1976     Last attempt to quit: 2013     Years since quittin.3    Smokeless tobacco: Never Used   Substance Use Topics    Alcohol use: No    Drug use: No       Allergies   Allergen Reactions    Morphine Rash     Current Outpatient Medications   Medication Sig Dispense Refill    budesonide-formoterol (SYMBICORT) 160-4.5 MCG/ACT AERO Inhale 2 puffs into the lungs 2 times daily 1 Inhaler 0    amoxicillin-clavulanate (AUGMENTIN) 875-125 MG per tablet Take 1 tablet by mouth 2 times daily for 10 days 20 tablet 0    rivaroxaban (XARELTO) 20 MG TABS tablet Take 1 tablet by mouth daily (with breakfast) 30 tablet 3    metoprolol succinate (TOPROL XL) 50 MG extended release tablet TAKE ONE TABLET BY MOUTH DAILY 90 tablet 3    furosemide (LASIX) 40 MG tablet Take 1 tablet by mouth daily (Patient taking differently: Take 40 mg by mouth daily Taking as needed) 90 tablet 3    lisinopril (PRINIVIL;ZESTRIL) 10 MG tablet TAKE ONE TABLET BY MOUTH DAILY 30 tablet 5    albuterol sulfate HFA (PROAIR HFA) 108 (90 Base) MCG/ACT inhaler Inhale 2 puffs into the lungs every 4 hours as needed for Wheezing or Shortness of Breath 1 Inhaler 11    budesonide (PULMICORT) 0.5 MG/2ML nebulizer suspension Take 2 mLs by nebulization 2 times daily 360 mL 3    formoterol (PERFOROMIST) 20 MCG/2ML nebulizer solution Take 2 mLs by nebulization 2 times daily 120 mL 3    SPIRIVA RESPIMAT 2.5 MCG/ACT AERS inhaler INHALE TWO PUFFS BY MOUTH DAILY 1 Inhaler 4    triamcinolone (KENALOG) 0.1 % cream       clobetasol (TEMOVATE) 0.05 % cream       nitroGLYCERIN (NITROSTAT) 0.4 MG SL tablet Place 1 tablet under the tongue every 5 minutes as needed for Chest pain 25 tablet 1    isosorbide mononitrate (IMDUR) 30 MG extended release tablet TAKE ONE TABLET BY MOUTH DAILY 90 tablet 3    rosuvastatin (CRESTOR) 40 MG tablet Take 1 tablet by mouth daily 90 tablet 3    albuterol (PROVENTIL) (2.5 MG/3ML) 0.083% nebulizer solution Take 2.5 mg by nebulization 4 times daily      Multiple Vitamins-Minerals (MULTI FOR HER 50+ PO) Take 1 tablet by mouth daily       No current facility-administered medications for this visit.         Physical Exam:   BP (!) regurgitation. Mild tricuspid regurgitation. Mild mitral regurgitation with mitral annular calcification    2/20/2019 Echo:  Mod. conc. LVH: EF 65%. No regional wall motion abnormalities are noted. Trivial mitral regurgitation. The left atrium is mildly dilated. Mild aortic regurgitation. Normal right ventricular size and function. Trivial tricuspid regurgitation. 8/16/2019 VL of aorta/IVC/iliac  Patent endovascular stent graft    No evidence of endoleak    Decreased diameter of the aortic aneurysm sac.    No changes when compared to the previous duplex scan of 2/15/2019         3/15/2019 Carotid US:  No hemodynamically significant carotid stenosis noted on right side.    Tortuous right common and internal carotid arteries        Moderate left internal carotid artery stenosis 50-79% by velocity (220/63    cm/s in proximal ICA).      Normal bilateral vertebral artery blood flow     3/2018 Cardiac cath:   of RCA and nonobstructive disease of LAD and LCX; normal LV function    2/19/2018 Lexiscan-Myoview:  Abnormal study. There is a moderate sized, mild intensity, partially    reversible defect of the mid to apical anterior and mid anterolateral walls    which is consistent with ischemia. There is breast attenuation but stress    images appear worse.    Normal LV size and systolic function.    Overall findings represent an intermediate risk study. [facility]: ([facility]) 4/2017   of RCA chronic LAD 10-20% RA 4 PA24/9 16 wedge 9 LVEDP markedly elevated 30     Carotid US 10/2017 at [facility]:  1. Estimated diameter reduction of the right internal carotid artery is  less than 50%. 2.  Estimated diameter reduction of the left internal carotid artery is  50-69%. 3.  The bilateral common carotid arteries reveal no evidence of   significant stenosis. 4.  There is greater than 50% stenosis of the right external carotid  artery. 5.  There is no evidence of significant stenosis in the left external  carotid artery. 6.  The bilateral vertebral arteries are patent with antegrade flow. 7.  The bilateral subclavian arteries reveal no evidence of significant  stenosis. 8.  There has been no significant change from the previous study of  4/21/2017.       Assessment:    1. SOB (shortness of breath)  -chronic and recently with exacerbation of her COPD  -improving after steroids and use of nebulizer  -advised to take her lasix (she has not been taking as prescribed)  -has had weight gain (secondary to steroids +/- CHF    2. AAA (abdominal aortic aneurysm) without rupture ([facility]  -follows with Dr. Cait Rosa  -S/P endovascular repair in 3/2018    3. Paroxysmal atrial fibrillation (HCC)  -continue BB  -maintaining sinus rhythm  -on long term anticoagulation with Xarelto  -[id number] Vasc score 7    4. Chronic diastolic HF (heart failure)  -reinforced use of daily lasix  -continue ACE, Toprol  -not on aldactone d/t elevated Cr  -low sodium diet and fluid restriction    5. Coronary artery disease involving native coronary artery of native heart without angina pectoris  -angina quiet  -continue medical management with toprol, imdur, lisinopril, statin  -known  of RCA; nonobstructive disease in LAD and circ from prior cath  -risk factor modification    6. Essential hypertension  -well controlled  -continue medical management  -goal BP < 130/80    7. Carotid artery stenosis without cerebral infarction, bilateral  -follows vascular  -14-19% LICA on US in 3/8330  -asx    8. Chronic obstructive pulmonary disease, unspecified COPD type ([facility]  - followed by Dr. Ariadna Nicolas    9.  Dyslipidemia  -on high intensity statin     Plan:  Discussed using Lasix on daily basis (take 40 mg every other day and 20 mg other days)  Continue imdur, lisinopril, Toprol, Xarelto and crestor  Emphasized and discussed low-fat/low sodium diet, monitoring of daily weights, fluid restriction, worsening signs and symptoms of heart failure and when to call, and the importance of regular exercise and activity. Emphasized and discussed strategies for smoking cessation (> 3-5 minutes of counseling given)  Follow up with Dr. Dorian Roberts in scheduled in March 2020 or sooner if needed. Return for as scheduled in March 2020 with Dr. Dorian Roberts. Thanks for allowing me to participate in the care of this patient.       Bea Russo, CHRISTINE-CNP  [facility], [address] [place] [address]) [place], [address] [place] [address]  [facility]  Office: [phone]  Fax: [phone]

## 2020-02-05 ENCOUNTER — OFFICE VISIT (OUTPATIENT)
Dept: CARDIOLOGY CLINIC | Age: 75
End: 2020-02-05
Payer: MEDICARE

## 2020-02-05 VITALS
HEIGHT: 64 IN | BODY MASS INDEX: 38.24 KG/M2 | WEIGHT: 224 LBS | DIASTOLIC BLOOD PRESSURE: 85 MMHG | SYSTOLIC BLOOD PRESSURE: 140 MMHG | OXYGEN SATURATION: 96 % | HEART RATE: 67 BPM

## 2020-02-05 PROCEDURE — G8484 FLU IMMUNIZE NO ADMIN: HCPCS | Performed by: NURSE PRACTITIONER

## 2020-02-05 PROCEDURE — G8417 CALC BMI ABV UP PARAM F/U: HCPCS | Performed by: NURSE PRACTITIONER

## 2020-02-05 PROCEDURE — 4040F PNEUMOC VAC/ADMIN/RCVD: CPT | Performed by: NURSE PRACTITIONER

## 2020-02-05 PROCEDURE — 1090F PRES/ABSN URINE INCON ASSESS: CPT | Performed by: NURSE PRACTITIONER

## 2020-02-05 PROCEDURE — 99214 OFFICE O/P EST MOD 30 MIN: CPT | Performed by: NURSE PRACTITIONER

## 2020-02-05 PROCEDURE — 3023F SPIROM DOC REV: CPT | Performed by: NURSE PRACTITIONER

## 2020-02-05 PROCEDURE — G8926 SPIRO NO PERF OR DOC: HCPCS | Performed by: NURSE PRACTITIONER

## 2020-02-05 PROCEDURE — 1036F TOBACCO NON-USER: CPT | Performed by: NURSE PRACTITIONER

## 2020-02-05 PROCEDURE — 3017F COLORECTAL CA SCREEN DOC REV: CPT | Performed by: NURSE PRACTITIONER

## 2020-02-05 PROCEDURE — 1123F ACP DISCUSS/DSCN MKR DOCD: CPT | Performed by: NURSE PRACTITIONER

## 2020-02-05 PROCEDURE — G8399 PT W/DXA RESULTS DOCUMENT: HCPCS | Performed by: NURSE PRACTITIONER

## 2020-02-05 PROCEDURE — G8427 DOCREV CUR MEDS BY ELIG CLIN: HCPCS | Performed by: NURSE PRACTITIONER

## 2020-02-15 ENCOUNTER — APPOINTMENT (OUTPATIENT)
Dept: GENERAL RADIOLOGY | Age: 75
End: 2020-02-15
Payer: MEDICARE

## 2020-02-15 ENCOUNTER — HOSPITAL ENCOUNTER (EMERGENCY)
Age: 75
Discharge: LEFT BEFORE DISCHARGE | End: 2020-02-15
Attending: EMERGENCY MEDICINE
Payer: MEDICARE

## 2020-02-15 VITALS
RESPIRATION RATE: 17 BRPM | HEIGHT: 64 IN | WEIGHT: 227.07 LBS | HEART RATE: 75 BPM | SYSTOLIC BLOOD PRESSURE: 168 MMHG | BODY MASS INDEX: 38.77 KG/M2 | TEMPERATURE: 98.3 F | OXYGEN SATURATION: 100 % | DIASTOLIC BLOOD PRESSURE: 88 MMHG

## 2020-02-15 LAB
ANION GAP SERPL CALCULATED.3IONS-SCNC: 11 MMOL/L (ref 3–16)
APTT: 37.2 SEC (ref 24.2–36.2)
BASOPHILS ABSOLUTE: 0 K/UL (ref 0–0.2)
BASOPHILS RELATIVE PERCENT: 0 %
BUN BLDV-MCNC: 29 MG/DL (ref 7–20)
CALCIUM SERPL-MCNC: 9.2 MG/DL (ref 8.3–10.6)
CHLORIDE BLD-SCNC: 101 MMOL/L (ref 99–110)
CO2: 28 MMOL/L (ref 21–32)
CREAT SERPL-MCNC: 1.6 MG/DL (ref 0.6–1.2)
EOSINOPHILS ABSOLUTE: 0.2 K/UL (ref 0–0.6)
EOSINOPHILS RELATIVE PERCENT: 1.9 %
GFR AFRICAN AMERICAN: 38
GFR NON-AFRICAN AMERICAN: 32
GLUCOSE BLD-MCNC: 100 MG/DL (ref 70–99)
HCT VFR BLD CALC: 36.2 % (ref 36–48)
HEMOGLOBIN: 11.5 G/DL (ref 12–16)
INR BLD: 2.22 (ref 0.86–1.14)
LYMPHOCYTES ABSOLUTE: 1.2 K/UL (ref 1–5.1)
LYMPHOCYTES RELATIVE PERCENT: 10.2 %
MCH RBC QN AUTO: 28.1 PG (ref 26–34)
MCHC RBC AUTO-ENTMCNC: 31.9 G/DL (ref 31–36)
MCV RBC AUTO: 88.2 FL (ref 80–100)
MONOCYTES ABSOLUTE: 0.6 K/UL (ref 0–1.3)
MONOCYTES RELATIVE PERCENT: 5.5 %
NEUTROPHILS ABSOLUTE: 9.6 K/UL (ref 1.7–7.7)
NEUTROPHILS RELATIVE PERCENT: 82.4 %
PDW BLD-RTO: 15.1 % (ref 12.4–15.4)
PLATELET # BLD: 209 K/UL (ref 135–450)
PMV BLD AUTO: 9.1 FL (ref 5–10.5)
POTASSIUM SERPL-SCNC: 4.9 MMOL/L (ref 3.5–5.1)
PRO-BNP: 2935 PG/ML (ref 0–449)
PRO-BNP: 3092 PG/ML (ref 0–449)
PROTHROMBIN TIME: 26 SEC (ref 10–13.2)
RBC # BLD: 4.11 M/UL (ref 4–5.2)
SODIUM BLD-SCNC: 140 MMOL/L (ref 136–145)
TROPONIN: 0.01 NG/ML
TROPONIN: 0.02 NG/ML
WBC # BLD: 11.6 K/UL (ref 4–11)

## 2020-02-15 PROCEDURE — 36415 COLL VENOUS BLD VENIPUNCTURE: CPT

## 2020-02-15 PROCEDURE — 6370000000 HC RX 637 (ALT 250 FOR IP): Performed by: EMERGENCY MEDICINE

## 2020-02-15 PROCEDURE — 85025 COMPLETE CBC W/AUTO DIFF WBC: CPT

## 2020-02-15 PROCEDURE — 84484 ASSAY OF TROPONIN QUANT: CPT

## 2020-02-15 PROCEDURE — 99285 EMERGENCY DEPT VISIT HI MDM: CPT

## 2020-02-15 PROCEDURE — 85730 THROMBOPLASTIN TIME PARTIAL: CPT

## 2020-02-15 PROCEDURE — 6360000002 HC RX W HCPCS: Performed by: EMERGENCY MEDICINE

## 2020-02-15 PROCEDURE — 80048 BASIC METABOLIC PNL TOTAL CA: CPT

## 2020-02-15 PROCEDURE — 83880 ASSAY OF NATRIURETIC PEPTIDE: CPT

## 2020-02-15 PROCEDURE — 71046 X-RAY EXAM CHEST 2 VIEWS: CPT

## 2020-02-15 PROCEDURE — 93005 ELECTROCARDIOGRAM TRACING: CPT | Performed by: EMERGENCY MEDICINE

## 2020-02-15 PROCEDURE — 85610 PROTHROMBIN TIME: CPT

## 2020-02-15 RX ORDER — ALBUTEROL SULFATE 2.5 MG/3ML
5 SOLUTION RESPIRATORY (INHALATION) ONCE
Status: COMPLETED | OUTPATIENT
Start: 2020-02-15 | End: 2020-02-15

## 2020-02-15 RX ORDER — IPRATROPIUM BROMIDE AND ALBUTEROL SULFATE 2.5; .5 MG/3ML; MG/3ML
1 SOLUTION RESPIRATORY (INHALATION) ONCE
Status: COMPLETED | OUTPATIENT
Start: 2020-02-15 | End: 2020-02-15

## 2020-02-15 RX ORDER — PREDNISONE 20 MG/1
40 TABLET ORAL ONCE
Status: COMPLETED | OUTPATIENT
Start: 2020-02-15 | End: 2020-02-15

## 2020-02-15 RX ADMIN — IPRATROPIUM BROMIDE AND ALBUTEROL SULFATE 1 AMPULE: .5; 3 SOLUTION RESPIRATORY (INHALATION) at 18:05

## 2020-02-15 RX ADMIN — PREDNISONE 40 MG: 20 TABLET ORAL at 18:04

## 2020-02-15 RX ADMIN — ALBUTEROL SULFATE 5 MG: 2.5 SOLUTION RESPIRATORY (INHALATION) at 18:50

## 2020-02-15 NOTE — LETTER
[place] [address]  Phone: [phone]    Patient: Henrry George: 1945  Date: 2/15/2020 Time: 7:00 PM    Leaving the [address] [place] [address] Advice    Chart #:[id number]    This will certify that I, the undersigned,    ______________________________________________________________________    A patient in the above named medical center, having requested discharge and removal from the medical center against the advice of my attending physician(s), hereby release the Emergency Department, its physicians, officers and employees, severally and individually, from any and all liability of any nature whatsoever for any injury or harm or complication of any kind that may result directly or indirectly, by reason of my terminating my stay as a patient from [facility], and hereby waive any and all rights of action I may now have or later acquire as a result of my voluntary departure from [facility] and the termination of my stay as a patient therein. This release is made with the full knowledge of the danger that may result from the action which I am taking.       Date:_______________________                         ___________________________                                                                                    Patient/Legal Representative    Witness:        ____________________________                          ___________________________  Nurse                                                                        Physician

## 2020-02-15 NOTE — ED PROVIDER NOTES
Triage Chief Complaint:   Shortness of Breath (shortness of breath onset 2 days ago, just getting over bronchitis)    MARISA Singh is a 77 y.o. female that presents w SOB x2 days. Past medical history significant for CAD, atrial fib on AC, HTN, abdominal aortic aneurysm (endovascular repair in 3/2018), chronic diastolic HF, sleep apnea and COPD. No headache no abdominal pain no change in urine no change in bowel    ROS:  At least 12 systems reviewed and otherwise acutely negative except as in the [address] Past Medical History:   Diagnosis Date    AAA (abdominal aortic aneurysm) ([facility]     pt states it is 4cm    AAA (abdominal aortic aneurysm) without rupture (HCC) 2/10/2015    Atrial fibrillation (HCC)     CAD (coronary artery disease)     CHF (congestive heart failure) ([facility]     COPD (chronic obstructive pulmonary disease) (HCC)     History of blood clots     Hyperlipidemia     Hypertension      Past Surgical History:   Procedure Laterality Date    ABDOMINAL AORTIC ANEURYSM REPAIR      Endovascular abdominal AA    APPENDECTOMY  1990    incidental    BLADDER SUSPENSION      CATARACT REMOVAL      CHOLECYSTECTOMY  10/15/13    COLONOSCOPY  9/2/07    dr Mackenzie Ortiz and check in 5 years.  COLONOSCOPY  06/16/2017    ok dr arndt, repeat 5 years   [address]    for benign tumor.  just the uterus    JOINT REPLACEMENT  12/2013    right knee replacement    TONSILLECTOMY  as a child    TUMOR EXCISION      benign behind right ear about 2008     Family History   Problem Relation Age of Onset    Heart Disease Father     Hypertension Other      Social History     Socioeconomic History    Marital status:      Spouse name: Not on file    Number of children: Not on file    Years of education: Not on file    Highest education level: Not on file   Occupational History    Occupation: housewife   Social Needs    Financial resource strain: Not on file    Food insecurity:     Worry: Not on file     Inability: Not on file    Transportation needs:     Medical: Not on file     Non-medical: Not on file   Tobacco Use    Smoking status: Former Smoker     Packs/day: 1.00     Years: 37.00     Pack years: 37.00     Types: Cigarettes     Start date: 1976     Last attempt to quit: 2013     Years since quittin.4    Smokeless tobacco: Never Used   Substance and Sexual Activity    Alcohol use: No    Drug use: No    Sexual activity: Yes     Partners: Male   Lifestyle    Physical activity:     Days per week: Not on file     Minutes per session: Not on file    Stress: Not on file   Relationships    Social connections:     Talks on phone: Not on file     Gets together: Not on file     Attends Rastafari service: Not on file     Active member of club or organization: Not on file     Attends meetings of clubs or organizations: Not on file     Relationship status: Not on file    Intimate partner violence:     Fear of current or ex partner: Not on file     Emotionally abused: Not on file     Physically abused: Not on file     Forced sexual activity: Not on file   Other Topics Concern    Not on file   Social History Narrative    Not on file     No current facility-administered medications for this encounter.       Current Outpatient Medications   Medication Sig Dispense Refill    budesonide-formoterol (SYMBICORT) 160-4.5 MCG/ACT AERO Inhale 2 puffs into the lungs 2 times daily 1 Inhaler 0    rivaroxaban (XARELTO) 20 MG TABS tablet Take 1 tablet by mouth daily (with breakfast) 30 tablet 3    metoprolol succinate (TOPROL XL) 50 MG extended release tablet TAKE ONE TABLET BY MOUTH DAILY 90 tablet 3    furosemide (LASIX) 40 MG tablet Take 1 tablet by mouth daily (Patient taking differently: Take 40 mg by mouth daily Taking as needed) 90 tablet 3    lisinopril (PRINIVIL;ZESTRIL) 10 MG tablet TAKE ONE TABLET BY MOUTH DAILY 30 tablet 5    albuterol sulfate HFA (PROAIR HFA) 108 (90 Base) MCG/ACT inhaler 02/15/20. Radiographs (if obtained):  [] The following radiograph was interpreted by myself in the absence of a radiologist:  [x] Radiologist's Report Reviewed:     XR CHEST STANDARD (2 VW) (Final result)   Result time 02/15/20 18:14:17   Final result by Arden Hopkins MD (02/15/20 18:14:17)                Impression:    No acute cardiopulmonary disease. Narrative:    EXAMINATION:  TWO XRAY VIEWS OF THE CHEST    2/15/2020 5:46 pm    COMPARISON:  01/27/2020    HISTORY:  ORDERING SYSTEM PROVIDED HISTORY: sob  TECHNOLOGIST PROVIDED HISTORY:  Reason for exam:->sob  Reason for Exam: increased sob over the last couple of days  Acuity: Acute  Type of Exam: Initial  Relevant Medical/Surgical History: COPD, AAA, CAD    FINDINGS:  The heart and mediastinal structures are stable.  The heart is enlarged.  The  lungs are clear with emphysematous changes noted.  Bones are osteopenic. Degenerative changes in the midthoracic spine are evident. EKG (if obtained): (All EKG's are interpreted by myself in the absence of a cardiologist)  Initial EKG on my interpretation shows sinus rhythm with PACs at a rate of 63 bpm with LVH but no ST segment elevation    MDM:  Differential diagnosis: ACS, COPD exacerbation, CHF exacerbation    DuoNeb and prednisone given. CBC shows wbc 11.6  BNP 3092  Trop 0.01  BMP at baseline  The patient requests a second breathing treatment so albuterol ordered. I have offered the patient admission however she refuses such. I have recommended admission to the hospital, but the patient refuses. The risks (including but not limited to further deteriotation and death) as well as the benefits were explained to the patient using layperson terms. Questions were sought and answered and the patient voiced understanding. However, the patient refuses admission. I have encouraged the patient to return to have their evaluation completed as we are glad to do so.  I have also instructed

## 2020-02-16 LAB
EKG ATRIAL RATE: 63 BPM
EKG DIAGNOSIS: NORMAL
EKG P AXIS: 58 DEGREES
EKG P-R INTERVAL: 182 MS
EKG Q-T INTERVAL: 436 MS
EKG QRS DURATION: 88 MS
EKG QTC CALCULATION (BAZETT): 446 MS
EKG R AXIS: -17 DEGREES
EKG T AXIS: 51 DEGREES
EKG VENTRICULAR RATE: 63 BPM

## 2020-02-16 PROCEDURE — 93010 ELECTROCARDIOGRAM REPORT: CPT | Performed by: INTERNAL MEDICINE

## 2020-02-16 NOTE — ED NOTES
GERMANN completed, states she feels better and wants to go home.       Dennis Goldman RN  02/15/20 [id number]

## 2020-02-17 ENCOUNTER — OFFICE VISIT (OUTPATIENT)
Dept: PULMONOLOGY | Age: 75
End: 2020-02-17
Payer: MEDICARE

## 2020-02-17 VITALS
SYSTOLIC BLOOD PRESSURE: 160 MMHG | OXYGEN SATURATION: 96 % | DIASTOLIC BLOOD PRESSURE: 90 MMHG | RESPIRATION RATE: 20 BRPM | HEIGHT: 64 IN | TEMPERATURE: 98.5 F | BODY MASS INDEX: 37.39 KG/M2 | WEIGHT: 219 LBS | HEART RATE: 62 BPM

## 2020-02-17 PROCEDURE — 4040F PNEUMOC VAC/ADMIN/RCVD: CPT | Performed by: INTERNAL MEDICINE

## 2020-02-17 PROCEDURE — 99214 OFFICE O/P EST MOD 30 MIN: CPT | Performed by: INTERNAL MEDICINE

## 2020-02-17 PROCEDURE — G8484 FLU IMMUNIZE NO ADMIN: HCPCS | Performed by: INTERNAL MEDICINE

## 2020-02-17 PROCEDURE — 3023F SPIROM DOC REV: CPT | Performed by: INTERNAL MEDICINE

## 2020-02-17 PROCEDURE — 1036F TOBACCO NON-USER: CPT | Performed by: INTERNAL MEDICINE

## 2020-02-17 PROCEDURE — G8926 SPIRO NO PERF OR DOC: HCPCS | Performed by: INTERNAL MEDICINE

## 2020-02-17 PROCEDURE — 1090F PRES/ABSN URINE INCON ASSESS: CPT | Performed by: INTERNAL MEDICINE

## 2020-02-17 PROCEDURE — 1123F ACP DISCUSS/DSCN MKR DOCD: CPT | Performed by: INTERNAL MEDICINE

## 2020-02-17 PROCEDURE — G8399 PT W/DXA RESULTS DOCUMENT: HCPCS | Performed by: INTERNAL MEDICINE

## 2020-02-17 PROCEDURE — G8427 DOCREV CUR MEDS BY ELIG CLIN: HCPCS | Performed by: INTERNAL MEDICINE

## 2020-02-17 PROCEDURE — 3017F COLORECTAL CA SCREEN DOC REV: CPT | Performed by: INTERNAL MEDICINE

## 2020-02-17 PROCEDURE — G8417 CALC BMI ABV UP PARAM F/U: HCPCS | Performed by: INTERNAL MEDICINE

## 2020-02-17 RX ORDER — ALBUTEROL SULFATE 2.5 MG/3ML
2.5 SOLUTION RESPIRATORY (INHALATION) EVERY 4 HOURS PRN
Qty: 120 ML | Refills: 5 | Status: SHIPPED | OUTPATIENT
Start: 2020-02-17 | End: 2021-07-08 | Stop reason: SDUPTHER

## 2020-02-17 RX ORDER — PREDNISONE 1 MG/1
5 TABLET ORAL DAILY
Qty: 14 TABLET | Refills: 0 | Status: SHIPPED | OUTPATIENT
Start: 2020-02-17 | End: 2020-03-02

## 2020-02-17 NOTE — PROGRESS NOTES
for Wheezing 120 mL 5    predniSONE (DELTASONE) 5 MG tablet Take 1 tablet by mouth daily for 14 days 14 tablet 0    budesonide-formoterol (SYMBICORT) 160-4.5 MCG/ACT AERO Inhale 2 puffs into the lungs 2 times daily 1 Inhaler 0    rivaroxaban (XARELTO) 20 MG TABS tablet Take 1 tablet by mouth daily (with breakfast) 30 tablet 3    metoprolol succinate (TOPROL XL) 50 MG extended release tablet TAKE ONE TABLET BY MOUTH DAILY 90 tablet 3    furosemide (LASIX) 40 MG tablet Take 1 tablet by mouth daily (Patient taking differently: Take 40 mg by mouth daily Taking as needed) 90 tablet 3    lisinopril (PRINIVIL;ZESTRIL) 10 MG tablet TAKE ONE TABLET BY MOUTH DAILY 30 tablet 5    albuterol sulfate HFA (PROAIR HFA) 108 (90 Base) MCG/ACT inhaler Inhale 2 puffs into the lungs every 4 hours as needed for Wheezing or Shortness of Breath 1 Inhaler 11    budesonide (PULMICORT) 0.5 MG/2ML nebulizer suspension Take 2 mLs by nebulization 2 times daily 360 mL 3    formoterol (PERFOROMIST) 20 MCG/2ML nebulizer solution Take 2 mLs by nebulization 2 times daily 120 mL 3    SPIRIVA RESPIMAT 2.5 MCG/ACT AERS inhaler INHALE TWO PUFFS BY MOUTH DAILY 1 Inhaler 4    nitroGLYCERIN (NITROSTAT) 0.4 MG SL tablet Place 1 tablet under the tongue every 5 minutes as needed for Chest pain 25 tablet 1    isosorbide mononitrate (IMDUR) 30 MG extended release tablet TAKE ONE TABLET BY MOUTH DAILY 90 tablet 3    rosuvastatin (CRESTOR) 40 MG tablet Take 1 tablet by mouth daily 90 tablet 3    albuterol (PROVENTIL) (2.5 MG/3ML) 0.083% nebulizer solution Take 2.5 mg by nebulization 4 times daily      Multiple Vitamins-Minerals (MULTI FOR HER 50+ PO) Take 1 tablet by mouth daily       No current facility-administered medications for this visit.         PHYSICAL EXAM:  Vitals:    02/17/20 1329   BP: (!) 160/90   Pulse:    Resp:    Temp:    SpO2:      ROS:  GENERAL:  Not feeling well, nervous about everything, weight gain   HEENT:  No double vision, blurry vision, or difficulty swallowing  HEART:  No chest pain, no palpitations  LUNGS:  SOb with any activity   ABDOMEN:  No abdominal pains, no changes in stools  GENITOURINARY:  Some frequent urination with lasix   EXTREMITIES:  No swelling, no lesions  NEURO:  No numbness or tingling, no seizures  SKIN:  No new rashes, no changes in hair or nails  LYMPH:  No masses, no swelling neck, armpits, or groin    PE  GENERAL:  Well nourished, alert, flat, looks worried   HEENT:  No scleral icterus, no conjunctival irritation  NECK:  No thyromegaly, no bruits  LYMPH:  No cervical or supraclavicular adenopathy  HEART:  Regular rate and rhythm, no murmurs  LUNGS:  Very poor air movement. Poor inspiratory effort  ABDOMEN:  No distention, no organomegaly  EXTREMITIES:  +  edema, no digital clubbing  NEURO:  No localizing deficits, CN II-XII intact    Pulmonary Function Testing 7/2015  Severe obstruction with no response to bronchodilators    Moderate Restriction    Moderate Reduction in diffusing capacity     Chest imaging from 2/2020 is reviewed. My impression is no obvious abnormality     ECHO 1/2020  There is moderate concentric left ventricular hypertrophy. Patient appears to be in atrial fibrillation. Ejection fraction is estimated to be 50-60%. Indeterminate diastolic function. Mild aortic regurgitation. Mild tricuspid regurgitation. Mild mitral regurgitation with mitral annular calcification      Lab Results   Component Value Date    WBC 11.6 (H) 02/15/2020    HGB 11.5 (L) 02/15/2020    HCT 36.2 02/15/2020    MCV 88.2 02/15/2020     02/15/2020       No results found for: BNP    Lab Results   Component Value Date    CREATININE 1.6 (H) 02/15/2020    BUN 29 (H) 02/15/2020     02/15/2020    K 4.9 02/15/2020     02/15/2020    CO2 28 02/15/2020     I reviewed the above labs and images      Assessment/Plan:  1. COPD, severe ([address]  Not controlled and not in a good spot with breathing.   She is

## 2020-02-20 ENCOUNTER — OFFICE VISIT (OUTPATIENT)
Dept: FAMILY MEDICINE CLINIC | Age: 75
End: 2020-02-20
Payer: MEDICARE

## 2020-02-20 VITALS
WEIGHT: 225 LBS | BODY MASS INDEX: 38.41 KG/M2 | HEIGHT: 64 IN | DIASTOLIC BLOOD PRESSURE: 88 MMHG | TEMPERATURE: 97.5 F | SYSTOLIC BLOOD PRESSURE: 146 MMHG

## 2020-02-20 PROCEDURE — 1090F PRES/ABSN URINE INCON ASSESS: CPT | Performed by: FAMILY MEDICINE

## 2020-02-20 PROCEDURE — G8399 PT W/DXA RESULTS DOCUMENT: HCPCS | Performed by: FAMILY MEDICINE

## 2020-02-20 PROCEDURE — 3017F COLORECTAL CA SCREEN DOC REV: CPT | Performed by: FAMILY MEDICINE

## 2020-02-20 PROCEDURE — G8417 CALC BMI ABV UP PARAM F/U: HCPCS | Performed by: FAMILY MEDICINE

## 2020-02-20 PROCEDURE — 4040F PNEUMOC VAC/ADMIN/RCVD: CPT | Performed by: FAMILY MEDICINE

## 2020-02-20 PROCEDURE — 1123F ACP DISCUSS/DSCN MKR DOCD: CPT | Performed by: FAMILY MEDICINE

## 2020-02-20 PROCEDURE — G8484 FLU IMMUNIZE NO ADMIN: HCPCS | Performed by: FAMILY MEDICINE

## 2020-02-20 PROCEDURE — 1036F TOBACCO NON-USER: CPT | Performed by: FAMILY MEDICINE

## 2020-02-20 PROCEDURE — G8427 DOCREV CUR MEDS BY ELIG CLIN: HCPCS | Performed by: FAMILY MEDICINE

## 2020-02-20 PROCEDURE — 99213 OFFICE O/P EST LOW 20 MIN: CPT | Performed by: FAMILY MEDICINE

## 2020-02-20 RX ORDER — CLONAZEPAM 0.5 MG/1
0.5 TABLET ORAL NIGHTLY PRN
Qty: 5 TABLET | Refills: 0 | Status: SHIPPED | OUTPATIENT
Start: 2020-02-20 | End: 2020-09-18

## 2020-02-20 NOTE — PROGRESS NOTES
Subjective:      Patient ID: Loreta Figueredo is a 77 y.o. female. Patient presents with: Anxiety: feeling anxious about taking Prednisone     Here for f/u treated for bronchitis  The prednisone causes her insomnia and anxiety and she tells me has had before  No fever now. Breathing is better now no cp   Cough is ok   No sore throat  Had some slow of kidney function since 2018    YOB: 1945    Date of Visit:  2/20/2020     -- Morphine -- Rash    --  rash   -- Other -- Rash    Current Outpatient Medications:  clonazePAM (KLONOPIN) 0.5 MG tablet, Take 1 tablet by mouth nightly as needed (sleep) for up to 5 days. , Disp: 5 tablet, Rfl: 0  albuterol (PROVENTIL) (2.5 MG/3ML) 0.083% nebulizer solution, Take 3 mLs by nebulization every 4 hours as needed for Wheezing, Disp: 120 mL, Rfl: 5  predniSONE (DELTASONE) 5 MG tablet, Take 1 tablet by mouth daily for 14 days, Disp: 14 tablet, Rfl: 0  budesonide-formoterol (SYMBICORT) 160-4.5 MCG/ACT AERO, Inhale 2 puffs into the lungs 2 times daily, Disp: 1 Inhaler, Rfl: 0  rivaroxaban (XARELTO) 20 MG TABS tablet, Take 1 tablet by mouth daily (with breakfast), Disp: 30 tablet, Rfl: 3  metoprolol succinate (TOPROL XL) 50 MG extended release tablet, TAKE ONE TABLET BY MOUTH DAILY, Disp: 90 tablet, Rfl: 3  furosemide (LASIX) 40 MG tablet, Take 1 tablet by mouth daily (Patient taking differently: Take 40 mg by mouth daily Taking as needed), Disp: 90 tablet, Rfl: 3  lisinopril (PRINIVIL;ZESTRIL) 10 MG tablet, TAKE ONE TABLET BY MOUTH DAILY, Disp: 30 tablet, Rfl: 5  albuterol sulfate HFA (PROAIR HFA) 108 (90 Base) MCG/ACT inhaler, Inhale 2 puffs into the lungs every 4 hours as needed for Wheezing or Shortness of Breath, Disp: 1 Inhaler, Rfl: 11  budesonide (PULMICORT) 0.5 MG/2ML nebulizer suspension, Take 2 mLs by nebulization 2 times daily, Disp: 360 mL, Rfl: 3  formoterol (PERFOROMIST) 20 MCG/2ML nebulizer solution, Take 2 mLs by nebulization 2 times daily, Disp: 120 mL, Rfl: 3  SPIRIVA RESPIMAT 2.5 MCG/ACT AERS inhaler, INHALE TWO PUFFS BY MOUTH DAILY, Disp: 1 Inhaler, Rfl: 4  nitroGLYCERIN (NITROSTAT) 0.4 MG SL tablet, Place 1 tablet under the tongue every 5 minutes as needed for Chest pain, Disp: 25 tablet, Rfl: 1  isosorbide mononitrate (IMDUR) 30 MG extended release tablet, TAKE ONE TABLET BY MOUTH DAILY, Disp: 90 tablet, Rfl: 3  rosuvastatin (CRESTOR) 40 MG tablet, Take 1 tablet by mouth daily, Disp: 90 tablet, Rfl: 3  albuterol (PROVENTIL) (2.5 MG/3ML) 0.083% nebulizer solution, Take 2.5 mg by nebulization 4 times daily, Disp: , Rfl:   Multiple Vitamins-Minerals (MULTI FOR HER 50+ PO), Take 1 tablet by mouth daily, Disp: , Rfl:     No current facility-administered medications for this visit.       ------------------------------------------               02/20/20 02/20/20                    1136            1141       ------------------------------------------   BP:        (!) 146/88      (!) 146/88     Site:    Left Upper Arm  Left Upper Arm   Position:     Sitting        Sitting       Cuff Size:   Large Adult    Large Adult     Temp:   97.5 °F (36.4 °C)                 TempSrc:      Oral                        Weight: 225 lb (102.1 kg)                 Height:  5' 4\" (1.626 m)                 ------------------------------------------  Body mass index is 38.62 kg/m². Wt Readings from Last 3 Encounters:  02/20/20 : 225 lb (102.1 kg)  02/17/20 : 219 lb (99.3 kg)  02/15/20 : 227 lb 1.2 oz (103 kg)    BP Readings from Last 3 Encounters:  02/20/20 : (!) 146/88  02/17/20 : (!) 160/90  02/15/20 : (!) 168/88        Review of Systems    Objective:   Physical Exam  Constitutional:       General: She is not in acute distress. Appearance: She is well-developed. She is not diaphoretic. Cardiovascular:      Rate and Rhythm: Normal rate and regular rhythm. Heart sounds: Normal heart sounds. No murmur. No friction rub.  No gallop. Pulmonary:      Effort: Pulmonary effort is normal. No tachypnea, accessory muscle usage or respiratory distress. Breath sounds: Normal breath sounds. No decreased breath sounds, wheezing, rhonchi or rales. Lymphadenopathy:      Cervical: No cervical adenopathy. Upper Body:      Right upper body: No supraclavicular adenopathy. Left upper body: No supraclavicular adenopathy. Skin:     General: Skin is warm and dry. Coloration: Skin is not pale. Neurological:      Mental Status: She is alert. Assessment:        Diagnosis Orders   1. Drug-induced insomnia (HCC)  clonazePAM (KLONOPIN) 0.5 MG tablet   2. Renal insufficiency  FRANKY - Peter Ordonez MD, Nephrology, [facility]     Orders Placed This Encounter   Medications    clonazePAM (KLONOPIN) 0.5 MG tablet     Sig: Take 1 tablet by mouth nightly as needed (sleep) for up to 5 days.      Dispense:  5 tablet     Refill:  0     [facility] report was accessed and reviewed on this date         Plan:      The sleeping medicine is just for a short time  See the kidney doctor  See me back the first of the month of April        Mira Glez MD

## 2020-02-21 ENCOUNTER — TELEPHONE (OUTPATIENT)
Dept: FAMILY MEDICINE CLINIC | Age: 75
End: 2020-02-21

## 2020-02-21 NOTE — TELEPHONE ENCOUNTER
Lo mckeon/ Dr. Krystin Meza stated that pt was referred to see Dr. Vipul Cardona and pt is wanting to be seen at [facility] and Dr. Vipul Cardona doesn't go there, so Lo would like to know if it was ok for pt to see someone else that goes to [facility]?    Pl advise Lo @ [phone]

## 2020-02-21 NOTE — TELEPHONE ENCOUNTER
Pt called to make appt with Dr Melida Tapia, and she can not get in until April 28th. Is there another kidney doctor she can see sooner, preferably one that comes to [facility].       Please call, [phone]

## 2020-02-21 NOTE — TELEPHONE ENCOUNTER
[phone] - Lo advised to schedule patient with anyone that goes to [id number] [facility] or [place].

## 2020-02-24 RX ORDER — BUDESONIDE AND FORMOTEROL FUMARATE DIHYDRATE 160; 4.5 UG/1; UG/1
AEROSOL RESPIRATORY (INHALATION)
Qty: 10.2 G | Refills: 0 | Status: ON HOLD | OUTPATIENT
Start: 2020-02-24 | End: 2020-02-29 | Stop reason: HOSPADM

## 2020-02-25 ENCOUNTER — HOSPITAL ENCOUNTER (INPATIENT)
Age: 75
LOS: 4 days | Discharge: HOME | DRG: 286 | End: 2020-02-29
Attending: INTERNAL MEDICINE | Admitting: INTERNAL MEDICINE
Payer: MEDICARE

## 2020-02-25 ENCOUNTER — APPOINTMENT (OUTPATIENT)
Dept: GENERAL RADIOLOGY | Age: 75
DRG: 286 | End: 2020-02-25
Payer: MEDICARE

## 2020-02-25 ENCOUNTER — HOSPITAL ENCOUNTER (OUTPATIENT)
Dept: CARDIAC REHAB | Age: 75
Setting detail: THERAPIES SERIES
Discharge: HOME | End: 2020-02-25
Payer: MEDICARE

## 2020-02-25 LAB
ANION GAP SERPL CALCULATED.3IONS-SCNC: 13 MMOL/L (ref 3–16)
BASOPHILS ABSOLUTE: 0.1 K/UL (ref 0–0.2)
BASOPHILS RELATIVE PERCENT: 0.8 %
BUN BLDV-MCNC: 25 MG/DL (ref 7–20)
CALCIUM SERPL-MCNC: 9.2 MG/DL (ref 8.3–10.6)
CHLORIDE BLD-SCNC: 100 MMOL/L (ref 99–110)
CO2: 26 MMOL/L (ref 21–32)
CREAT SERPL-MCNC: 1.3 MG/DL (ref 0.6–1.2)
EOSINOPHILS ABSOLUTE: 0.1 K/UL (ref 0–0.6)
EOSINOPHILS RELATIVE PERCENT: 0.6 %
GFR AFRICAN AMERICAN: 48
GFR NON-AFRICAN AMERICAN: 40
GLUCOSE BLD-MCNC: 112 MG/DL (ref 70–99)
HCT VFR BLD CALC: 38.2 % (ref 36–48)
HEMOGLOBIN: 12 G/DL (ref 12–16)
LYMPHOCYTES ABSOLUTE: 1.1 K/UL (ref 1–5.1)
LYMPHOCYTES RELATIVE PERCENT: 8.7 %
MCH RBC QN AUTO: 28.3 PG (ref 26–34)
MCHC RBC AUTO-ENTMCNC: 31.5 G/DL (ref 31–36)
MCV RBC AUTO: 89.7 FL (ref 80–100)
MONOCYTES ABSOLUTE: 0.7 K/UL (ref 0–1.3)
MONOCYTES RELATIVE PERCENT: 5.5 %
NEUTROPHILS ABSOLUTE: 10.1 K/UL (ref 1.7–7.7)
NEUTROPHILS RELATIVE PERCENT: 84.4 %
PDW BLD-RTO: 15.8 % (ref 12.4–15.4)
PLATELET # BLD: 208 K/UL (ref 135–450)
PMV BLD AUTO: 9.4 FL (ref 5–10.5)
POTASSIUM REFLEX MAGNESIUM: 4.9 MMOL/L (ref 3.5–5.1)
PRO-BNP: 5941 PG/ML (ref 0–449)
RBC # BLD: 4.25 M/UL (ref 4–5.2)
SODIUM BLD-SCNC: 139 MMOL/L (ref 136–145)
TROPONIN: 0.01 NG/ML
WBC # BLD: 12 K/UL (ref 4–11)

## 2020-02-25 PROCEDURE — 6360000002 HC RX W HCPCS: Performed by: INTERNAL MEDICINE

## 2020-02-25 PROCEDURE — 83880 ASSAY OF NATRIURETIC PEPTIDE: CPT

## 2020-02-25 PROCEDURE — 94640 AIRWAY INHALATION TREATMENT: CPT

## 2020-02-25 PROCEDURE — 94761 N-INVAS EAR/PLS OXIMETRY MLT: CPT

## 2020-02-25 PROCEDURE — 80048 BASIC METABOLIC PNL TOTAL CA: CPT

## 2020-02-25 PROCEDURE — 99285 EMERGENCY DEPT VISIT HI MDM: CPT

## 2020-02-25 PROCEDURE — 96374 THER/PROPH/DIAG INJ IV PUSH: CPT

## 2020-02-25 PROCEDURE — 6360000002 HC RX W HCPCS: Performed by: NURSE PRACTITIONER

## 2020-02-25 PROCEDURE — 2060000000 HC ICU INTERMEDIATE R&B

## 2020-02-25 PROCEDURE — 6370000000 HC RX 637 (ALT 250 FOR IP): Performed by: INTERNAL MEDICINE

## 2020-02-25 PROCEDURE — 93005 ELECTROCARDIOGRAM TRACING: CPT | Performed by: EMERGENCY MEDICINE

## 2020-02-25 PROCEDURE — 2500000003 HC RX 250 WO HCPCS: Performed by: INTERNAL MEDICINE

## 2020-02-25 PROCEDURE — 71046 X-RAY EXAM CHEST 2 VIEWS: CPT

## 2020-02-25 PROCEDURE — 85025 COMPLETE CBC W/AUTO DIFF WBC: CPT

## 2020-02-25 PROCEDURE — 96375 TX/PRO/DX INJ NEW DRUG ADDON: CPT

## 2020-02-25 PROCEDURE — 84484 ASSAY OF TROPONIN QUANT: CPT

## 2020-02-25 PROCEDURE — 2580000003 HC RX 258: Performed by: INTERNAL MEDICINE

## 2020-02-25 RX ORDER — ISOSORBIDE MONONITRATE 30 MG/1
30 TABLET, EXTENDED RELEASE ORAL DAILY
Status: DISCONTINUED | OUTPATIENT
Start: 2020-02-26 | End: 2020-02-29 | Stop reason: HOSPADM

## 2020-02-25 RX ORDER — METOPROLOL SUCCINATE 50 MG/1
50 TABLET, EXTENDED RELEASE ORAL DAILY
Status: DISCONTINUED | OUTPATIENT
Start: 2020-02-26 | End: 2020-02-29 | Stop reason: HOSPADM

## 2020-02-25 RX ORDER — SODIUM CHLORIDE 0.9 % (FLUSH) 0.9 %
10 SYRINGE (ML) INJECTION PRN
Status: DISCONTINUED | OUTPATIENT
Start: 2020-02-25 | End: 2020-02-28 | Stop reason: SDUPTHER

## 2020-02-25 RX ORDER — ACETAMINOPHEN 325 MG/1
650 TABLET ORAL EVERY 6 HOURS PRN
Status: DISCONTINUED | OUTPATIENT
Start: 2020-02-25 | End: 2020-02-28 | Stop reason: SDUPTHER

## 2020-02-25 RX ORDER — ALBUTEROL SULFATE 90 UG/1
2 AEROSOL, METERED RESPIRATORY (INHALATION) EVERY 4 HOURS PRN
Status: DISCONTINUED | OUTPATIENT
Start: 2020-02-25 | End: 2020-02-29 | Stop reason: HOSPADM

## 2020-02-25 RX ORDER — BUDESONIDE 0.5 MG/2ML
500 INHALANT ORAL 2 TIMES DAILY
Status: DISCONTINUED | OUTPATIENT
Start: 2020-02-25 | End: 2020-02-29 | Stop reason: HOSPADM

## 2020-02-25 RX ORDER — ARFORMOTEROL TARTRATE 15 UG/2ML
15 SOLUTION RESPIRATORY (INHALATION) 2 TIMES DAILY
Status: DISCONTINUED | OUTPATIENT
Start: 2020-02-25 | End: 2020-02-29 | Stop reason: HOSPADM

## 2020-02-25 RX ORDER — CLONAZEPAM 0.5 MG/1
0.5 TABLET ORAL NIGHTLY PRN
Status: DISCONTINUED | OUTPATIENT
Start: 2020-02-25 | End: 2020-02-29 | Stop reason: HOSPADM

## 2020-02-25 RX ORDER — LABETALOL HYDROCHLORIDE 5 MG/ML
10 INJECTION, SOLUTION INTRAVENOUS EVERY 4 HOURS PRN
Status: DISCONTINUED | OUTPATIENT
Start: 2020-02-25 | End: 2020-02-27

## 2020-02-25 RX ORDER — PROMETHAZINE HYDROCHLORIDE 25 MG/1
12.5 TABLET ORAL EVERY 6 HOURS PRN
Status: DISCONTINUED | OUTPATIENT
Start: 2020-02-25 | End: 2020-02-29 | Stop reason: HOSPADM

## 2020-02-25 RX ORDER — ROSUVASTATIN CALCIUM 40 MG/1
40 TABLET, COATED ORAL DAILY
Status: DISCONTINUED | OUTPATIENT
Start: 2020-02-26 | End: 2020-02-29 | Stop reason: HOSPADM

## 2020-02-25 RX ORDER — SODIUM CHLORIDE 0.9 % (FLUSH) 0.9 %
10 SYRINGE (ML) INJECTION EVERY 12 HOURS SCHEDULED
Status: DISCONTINUED | OUTPATIENT
Start: 2020-02-25 | End: 2020-02-29 | Stop reason: HOSPADM

## 2020-02-25 RX ORDER — ALBUTEROL SULFATE 2.5 MG/3ML
2.5 SOLUTION RESPIRATORY (INHALATION) EVERY 4 HOURS PRN
Status: DISCONTINUED | OUTPATIENT
Start: 2020-02-25 | End: 2020-02-29 | Stop reason: HOSPADM

## 2020-02-25 RX ORDER — FUROSEMIDE 10 MG/ML
40 INJECTION INTRAMUSCULAR; INTRAVENOUS ONCE
Status: COMPLETED | OUTPATIENT
Start: 2020-02-25 | End: 2020-02-25

## 2020-02-25 RX ORDER — ALBUTEROL SULFATE 2.5 MG/3ML
2.5 SOLUTION RESPIRATORY (INHALATION) 4 TIMES DAILY
Status: DISCONTINUED | OUTPATIENT
Start: 2020-02-25 | End: 2020-02-29 | Stop reason: HOSPADM

## 2020-02-25 RX ORDER — ONDANSETRON 2 MG/ML
4 INJECTION INTRAMUSCULAR; INTRAVENOUS EVERY 6 HOURS PRN
Status: DISCONTINUED | OUTPATIENT
Start: 2020-02-25 | End: 2020-02-28 | Stop reason: SDUPTHER

## 2020-02-25 RX ORDER — METHYLPREDNISOLONE SODIUM SUCCINATE 125 MG/2ML
60 INJECTION, POWDER, LYOPHILIZED, FOR SOLUTION INTRAMUSCULAR; INTRAVENOUS ONCE
Status: COMPLETED | OUTPATIENT
Start: 2020-02-25 | End: 2020-02-25

## 2020-02-25 RX ORDER — ACETAMINOPHEN 650 MG/1
650 SUPPOSITORY RECTAL EVERY 6 HOURS PRN
Status: DISCONTINUED | OUTPATIENT
Start: 2020-02-25 | End: 2020-02-29 | Stop reason: HOSPADM

## 2020-02-25 RX ORDER — BUDESONIDE AND FORMOTEROL FUMARATE DIHYDRATE 160; 4.5 UG/1; UG/1
2 AEROSOL RESPIRATORY (INHALATION) 2 TIMES DAILY
Status: DISCONTINUED | OUTPATIENT
Start: 2020-02-25 | End: 2020-02-29 | Stop reason: HOSPADM

## 2020-02-25 RX ORDER — LISINOPRIL 10 MG/1
10 TABLET ORAL DAILY
Status: DISCONTINUED | OUTPATIENT
Start: 2020-02-26 | End: 2020-02-26

## 2020-02-25 RX ORDER — POLYETHYLENE GLYCOL 3350 17 G/17G
17 POWDER, FOR SOLUTION ORAL DAILY PRN
Status: DISCONTINUED | OUTPATIENT
Start: 2020-02-25 | End: 2020-02-29 | Stop reason: HOSPADM

## 2020-02-25 RX ORDER — FUROSEMIDE 10 MG/ML
40 INJECTION INTRAMUSCULAR; INTRAVENOUS 2 TIMES DAILY
Status: DISCONTINUED | OUTPATIENT
Start: 2020-02-25 | End: 2020-02-26

## 2020-02-25 RX ADMIN — ALBUTEROL SULFATE 2.5 MG: 2.5 SOLUTION RESPIRATORY (INHALATION) at 16:36

## 2020-02-25 RX ADMIN — FUROSEMIDE 40 MG: 10 INJECTION, SOLUTION INTRAMUSCULAR; INTRAVENOUS at 20:41

## 2020-02-25 RX ADMIN — METHYLPREDNISOLONE SODIUM SUCCINATE 60 MG: 125 INJECTION, POWDER, FOR SOLUTION INTRAMUSCULAR; INTRAVENOUS at 14:05

## 2020-02-25 RX ADMIN — BUDESONIDE AND FORMOTEROL FUMARATE DIHYDRATE 2 PUFF: 160; 4.5 AEROSOL RESPIRATORY (INHALATION) at 20:52

## 2020-02-25 RX ADMIN — ARFORMOTEROL TARTRATE 15 MCG: 15 SOLUTION RESPIRATORY (INHALATION) at 20:52

## 2020-02-25 RX ADMIN — CLONAZEPAM 0.5 MG: 0.5 TABLET ORAL at 20:43

## 2020-02-25 RX ADMIN — LABETALOL HYDROCHLORIDE 10 MG: 5 INJECTION INTRAVENOUS at 20:41

## 2020-02-25 RX ADMIN — ALBUTEROL SULFATE 2.5 MG: 2.5 SOLUTION RESPIRATORY (INHALATION) at 20:52

## 2020-02-25 RX ADMIN — LABETALOL HYDROCHLORIDE 10 MG: 5 INJECTION INTRAVENOUS at 14:17

## 2020-02-25 RX ADMIN — SODIUM CHLORIDE, PRESERVATIVE FREE 10 ML: 5 INJECTION INTRAVENOUS at 20:43

## 2020-02-25 RX ADMIN — BUDESONIDE 500 MCG: 0.5 SUSPENSION RESPIRATORY (INHALATION) at 20:52

## 2020-02-25 RX ADMIN — FUROSEMIDE 40 MG: 10 INJECTION, SOLUTION INTRAMUSCULAR; INTRAVENOUS at 13:15

## 2020-02-25 ASSESSMENT — ENCOUNTER SYMPTOMS
COUGH: 1
GASTROINTESTINAL NEGATIVE: 1
SHORTNESS OF BREATH: 1

## 2020-02-25 ASSESSMENT — PAIN SCALES - GENERAL: PAINLEVEL_OUTOF10: 0

## 2020-02-25 NOTE — ED NOTES
Bed: [facility]  Expected date:   Expected time:   Means of arrival:   Comments:     Tj Otto RN  02/25/20 [id number]

## 2020-02-25 NOTE — PROGRESS NOTES
Patient arrived via stretcher from ED to room 5121. Heart monitor connected and verified with [facility]. Vital signs, assessment, 4 eyes, and admission completed. Patient oriented to unit and room. Call light and bedside table within reach. All questions answered. Patient ambulated to bathroom- she gets SOB on exertion. Patient resting quietly with no further questions at this time.   Will continue to monitor  Electronically signed by Vida Napoles RN on 2/25/2020 at 3:58 PM

## 2020-02-25 NOTE — ED NOTES
Report called to [facility]. Denies questions at this time. Transportation will be arranged.       Hari Limon RN  02/25/20 CABRERA Singh  02/25/20 [id number]

## 2020-02-25 NOTE — ED PROVIDER NOTES
noted.    RADIOLOGY:   Non-plain film images such as CT, Ultrasound and MRI are read by the radiologist. Plain radiographic images are visualized and preliminarilyinterpreted by the emergency physician with the below findings:    Interpretation per the Radiologist below,if available at the time of this note:    XR CHEST STANDARD (2 VW)   Final Result   No evidence of acute cardiopulmonary disease. LABS:  Labs Reviewed   CBC WITH AUTO DIFFERENTIAL - Abnormal; Notable for the following components:       Result Value    WBC 12.0 (*)     RDW 15.8 (*)     Neutrophils Absolute 10.1 (*)     All other components within normal limits    Narrative:     Performed at:  [address] [employer] [address]   Phone [phone]   BASIC METABOLIC PANEL W/ REFLEX TO MG FOR LOW K - Abnormal; Notable for the following components:    Glucose 112 (*)     BUN 25 (*)     CREATININE 1.3 (*)     GFR Non- 40 (*)     GFR  48 (*)     All other components within normal limits    Narrative:     Performed at:  [address] [facility] [employer] [address]   Phone [phone]   BRAIN NATRIURETIC PEPTIDE - Abnormal; Notable for the following components:    Pro-BNP 5,941 (*)     All other components within normal limits    Narrative:     Performed at:  [address] [facility] [employer] [address]   Phone [phone]   TROPONIN    Narrative:     Performed at:  [address] [facility] [employer] [address]   Phone [phone]       All other labs were within normal range or not returned as of this dictation.     EMERGENCY DEPARTMENT COURSE and DIFFERENTIAL DIAGNOSIS/MDM:   Vitals:    Vitals:    02/25/20 1107 02/25/20 1117 02/25/20 1136   BP: (!) 202/113 (!) 177/91 (!) 157/114   Pulse: 89 82 101   Resp: 16 25 (!) 32 Temp: 98 °F (36.7 °C)     TempSrc: Oral     SpO2: 96% 94% 95%   Weight: 235 lb 3.7 oz (106.7 kg)     Height: 5' 4\" (1.626 m)       Medications   methylPREDNISolone sodium (SOLU-MEDROL) injection 60 mg (has no administration in time range)   furosemide (LASIX) injection 40 mg (40 mg Intravenous Given 2/25/20 1315)       MDM  Patient was seen and evaluated per myself. Dr. Omar Mercedes is present available for consultation as needed. Differential diagnosis: COPD exacerbation, acute on chronic heart failure, acute on chronic renal failure, MI    Plan: Likely admission for shortness of breath. Chest x-ray, EKG, laboratory studies, diuresis and nebulizers as needed. _________________________________________________________________________  Echocardiogram January 2020  Conclusions      Summary   There is moderate concentric left ventricular hypertrophy. Patient appears to be in atrial fibrillation. Ejection fraction is estimated to be 50-60%. Indeterminate diastolic function. Mild aortic regurgitation. Mild tricuspid regurgitation. Mild mitral regurgitation with mitral annular calcification      Signature      ------------------------------------------------------------------   Electronically signed by Tiarra Kerr MD (Interpreting   physician) on 01/02/2020 at 01:29 PM    Cardiac catheterization: March 2018:  SUMMARY:  1.  100% occlusion of the proximal right coronary artery. 2.  Patent left main trunk with mild disease at the distal segment. 3.  20% to 30% stenosis of the ostial left anterior descending artery with  evidence of tortuosity. 4.  30% to 40% stenosis of the proximal circumflex artery. 5.  Large collaterals from the left anterior descending artery to the  distal right coronary artery. 6.  Normal left venous systolic function with estimated EF of 50% to 55%.     OVERALL IMPRESSION  1. Again, 100% proximal right coronary artery occlusion with left to right  collaterals.   2.  Mild disease of the distal left main trunk. 3.  20% to 30% ostial left anterior descending artery stenosis with  collaterals from LAD to the right coronary artery. 4.  30% to 40% stenosis of the proximal circumflex artery. 5.  Normal left ventricular systolic function with estimated EF of 55% to  60%. 6.  Slightly enlarged aortic root with no evidence of aortic stenosis or  regurgitation.     In view of the above findings, we will okay the patient for her upcoming  aortic aneurysm surgery from cardiac standpoint. Luciano Nevarez MD    Myocardial nuclear medicine study: February 2018  Conclusions        Summary    Abnormal study. There is a moderate sized, mild intensity, partially    reversible defect of the mid to apical anterior and mid anterolateral walls    which is consistent with ischemia. There is breast attenuation but stress    images appear worse.    Normal LV size and systolic function.    Overall findings represent an intermediate risk study.       Stress Protocols        Resting ECG    Normal sinus rhythm. Normal ECG.      Resting HR:61 bpm            Resting BP:178/94 mmHg        Pre-stress physical exam: Lexiscan Stress Test:    Findings on the brief pre-stress cardiopulmonary exam:    unremarkable heart and lungs.    Prior to test initiation, patient states she is asymptomatic.           __________________________________________________________________________    Telemetry monitoring consistently revealing atrial fibrillation. Blood pressure consistently elevated. Respiratory rate maintaining 28-32 with pulse oximetry 95%. BNP greater than 5000  Troponin less than 1.65  Metabolic panel: Chronic kidney disease: BUN: 25, creatinine 1.3, GFR 40  CBC indicating minimal to mild leukocytosis: Total white blood cell count 12. With a left shift of absolute neutrophils 10.1. Chest x-ray negative for any evidence of acute findings. Last INR: February 15, 2020:  2.2    1325:  I am consulting with Dr. Amanuel Wilhelm,

## 2020-02-25 NOTE — PLAN OF CARE
Problem: Falls - Risk of:  Goal: Will remain free from falls  Description  Will remain free from falls  Outcome: Ongoing  Goal: Absence of physical injury  Description  Absence of physical injury  Outcome: Ongoing     Problem: OXYGENATION/RESPIRATORY FUNCTION  Goal: Patient will maintain patent airway  Outcome: Ongoing  Goal: Patient will achieve/maintain normal respiratory rate/effort  Description  Respiratory rate and effort will be within normal limits for the patient  Outcome: Ongoing     Problem: HEMODYNAMIC STATUS  Goal: Patient has stable vital signs and fluid balance  Outcome: Ongoing     Problem: FLUID AND ELECTROLYTE IMBALANCE  Goal: Fluid and electrolyte balance are achieved/maintained  Outcome: Ongoing     Problem: ACTIVITY INTOLERANCE/IMPAIRED MOBILITY  Goal: Mobility/activity is maintained at optimum level for patient  Outcome: Ongoing     Problem: Infection:  Goal: Will remain free from infection  Description  Will remain free from infection  Outcome: Ongoing     Problem: Safety:  Goal: Free from accidental physical injury  Description  Free from accidental physical injury  Outcome: Ongoing  Goal: Free from intentional harm  Description  Free from intentional harm  Outcome: Ongoing     Problem: Daily Care:  Goal: Daily care needs are met  Description  Daily care needs are met  Outcome: Ongoing     Problem: Pain:  Goal: Patient's pain/discomfort is manageable  Description  Patient's pain/discomfort is manageable  Outcome: Ongoing     Problem: Skin Integrity:  Goal: Skin integrity will stabilize  Description  Skin integrity will stabilize  Outcome: Ongoing     Problem: Discharge Planning:  Goal: Patients continuum of care needs are met  Description  Patients continuum of care needs are met  Outcome: Ongoing

## 2020-02-25 NOTE — ED TRIAGE NOTES
Patient arrived to ED via EMS Patient reports she has not been feeling well over the past week d/t SOB from bronchitis. Patient reports tolday she was supposed to have a \"some test for my breathing. \" Patient was reported to be tachycardic in the 170-200. Patient is alert and oriented x4. Patient is labored with any movement.

## 2020-02-25 NOTE — H&P
Hospital Medicine History and Physical    2/25/2020    Date of Admission: 2/25/2020    Date of Service: Pt seen/examined on 2/25/2020 and admitted to inpatient. Assessment:  1. Acute on chronic diastolic heart failure. 2. Acute respiratory failure with hypoxia. Likely secondary to #1. 3. Hypertensive urgency. Likely secondary to volume overload. 4. Other comorbidities: Chronic diastolic heart failure, CAD, COPD, history of thrombosis, abdominal aortic aneurysm, chronic atrial fibrillation on anticoagulation with Xarelto, essential hypertension, hyperlipidemia, morbid obesity with BMI of 40 kg/m². Plan:  1. Patient received a dose of IV Lasix 40 mg in the emergency room. Will continue IV Lasix 40 mg twice daily. 2. Continue home breathing treatments for COPD. Continue supplemental oxygen and wean as tolerated. 3. Continue home antihypertensive medications. PRN IV labetalol added for systolic blood pressure greater than 150.  4. Maintain on fluid restrictions to 2 L/min. Monitor volume status closely. Monitor on telemetry. Consider cardiology consult in a.m. Activities: Up with assist  Prophylaxis: On Xarelto  Code status: Full code    ==========================================================  Chief complaint:  Chief Complaint   Patient presents with    Shortness of Breath     History of Presenting Illness: This is a pleasant 77 y.o. female with history of chronic diastolic heart failure, CAD, COPD, history of thrombosis, abdominal aortic aneurysm, atrial fibrillation on chronic anticoagulation with Xarelto, essential hypertension, hyperlipidemia, who presents to the emergency room with complaints of shortness of breath that is mostly worse even with minimal exertion. Symptoms have been ongoing for the past couple of days, so debilitating that she could not walk across the room without getting short of breath.   On presentation to the emergency room, chest x-ray was unremarkable; however, proBNP is significantly elevated at 5941 compared to recent values of 2935 about 10 days ago. She does not have cough or fever or chills. She has been admitted for further evaluation. Recent echocardiogram from January 2020 with moderate left ventricular hypertrophy with ejection fraction of 50 to 60%. Past Medical History:      Diagnosis Date    AAA (abdominal aortic aneurysm) ([address]     pt states it is 4cm    AAA (abdominal aortic aneurysm) without rupture (HCC) 2/10/2015    Atrial fibrillation (HCC)     CAD (coronary artery disease)     CHF (congestive heart failure) ([address]     COPD (chronic obstructive pulmonary disease) (HCC)     History of blood clots     Hyperlipidemia     Hypertension        Past Surgical History:      Procedure Laterality Date    ABDOMINAL AORTIC ANEURYSM REPAIR      Endovascular abdominal AA    APPENDECTOMY  1990    incidental    BLADDER SUSPENSION      CATARACT REMOVAL      CHOLECYSTECTOMY  10/15/13    COLONOSCOPY  9/2/07    dr Angelita Valerio and check in 5 years.  COLONOSCOPY  06/16/2017    ok dr arndt, repeat 5 years   [address]    for benign tumor. just the uterus    JOINT REPLACEMENT  12/2013    right knee replacement    TONSILLECTOMY  as a child    TUMOR EXCISION      benign behind right ear about 2008       Medications (prior to admission):  Prior to Admission medications    Medication Sig Start Date End Date Taking? Authorizing Provider   SYMBICORT 160-4.5 MCG/ACT AERO INHALE TWO PUFFS BY MOUTH TWICE A DAY 2/24/20  Yes CHRISTINE Hansen - CNP   clonazePAM (KLONOPIN) 0.5 MG tablet Take 1 tablet by mouth nightly as needed (sleep) for up to 5 days.  2/20/20 2/25/20 Yes Natalie Lugo MD   albuterol (PROVENTIL) (2.5 MG/3ML) 0.083% nebulizer solution Take 3 mLs by nebulization every 4 hours as needed for Wheezing 2/17/20  Yes Edgar Andujar DO   predniSONE (DELTASONE) 5 MG tablet Take 1 tablet by mouth daily for 14 days 2/17/20 3/2/20 Yes Antony Babin, DO   rivaroxaban (XARELTO) 20 MG TABS tablet Take 1 tablet by mouth daily (with breakfast) 12/3/19  Yes Antony Babin, DO   metoprolol succinate (TOPROL XL) 50 MG extended release tablet TAKE ONE TABLET BY MOUTH DAILY 11/27/19  Yes CHRISTINE Rodriguez CNP   furosemide (LASIX) 40 MG tablet Take 1 tablet by mouth daily  Patient taking differently: Take 40 mg by mouth daily as needed (swelling) Taking as needed 11/20/19  Yes CHRISTINE Love CNP   lisinopril (PRINIVIL;ZESTRIL) 10 MG tablet TAKE ONE TABLET BY MOUTH DAILY 11/12/19  Yes Estephania Self MD   albuterol sulfate HFA (PROAIR HFA) 108 (90 Base) MCG/ACT inhaler Inhale 2 puffs into the lungs every 4 hours as needed for Wheezing or Shortness of Breath 10/16/19  Yes Antony Babin, DO   budesonide (PULMICORT) 0.5 MG/2ML nebulizer suspension Take 2 mLs by nebulization 2 times daily 7/24/19  Yes Antony Babin, DO   formoterol (PERFOROMIST) 20 MCG/2ML nebulizer solution Take 2 mLs by nebulization 2 times daily 7/24/19  Yes Antony Babin, DO   SPIRIVA RESPIMAT 2.5 MCG/ACT AERS inhaler INHALE TWO PUFFS BY MOUTH DAILY 3/13/19  Yes Antony Babin, DO   nitroGLYCERIN (NITROSTAT) 0.4 MG SL tablet Place 1 tablet under the tongue every 5 minutes as needed for Chest pain 3/5/19  Yes Gustabo Salcido MD   isosorbide mononitrate (IMDUR) 30 MG extended release tablet TAKE ONE TABLET BY MOUTH DAILY 2/18/19  Yes Estephania Self MD   rosuvastatin (CRESTOR) 40 MG tablet Take 1 tablet by mouth daily 6/1/18  Yes Estephania Self MD   albuterol (PROVENTIL) (2.5 MG/3ML) 0.083% nebulizer solution Take 2.5 mg by nebulization 4 times daily   Yes Historical Provider, MD   Multiple Vitamins-Minerals (MULTI FOR HER 50+ PO) Take 1 tablet by mouth daily   Yes Historical Provider, MD       Allergy(ies): Morphine and Other    Social History:  TOBACCO:  reports that she quit smoking about 6 years ago.  Her smoking use included cigarettes. She started smoking about 43 years ago. She has a 37.00 pack-year smoking history. She has never used smokeless tobacco.  ETOH:  reports no history of alcohol use. Family History:      Problem Relation Age of Onset    Heart Disease Father     Hypertension Other        Review of Systems:  Pertinent positives are listed in HPI. At least 10-point ROS reviewed and were negative. Vitals and physical examination:  BP (!) 157/114   Pulse 101   Temp 98 °F (36.7 °C) (Oral)   Resp (!) 32   Ht 5' 4\" (1.626 m)   Wt 235 lb 3.7 oz (106.7 kg)   SpO2 95%   BMI 40.38 kg/m²   Gen/overall appearance: Not in acute distress. Alert. Oriented x3. Head: Normocephalic, atraumatic  Eyes: EOMI, good acuity  ENT: Oral mucosa moist  Neck: No JVD, thyromegaly  CVS: Nml S1S2, no MRG. Irregular. Pulm: Dependent bibasilar crackles present. No accessory muscle use. Gastrointestinal: Soft, NT/ND, +BS  Musculoskeletal: Trace bilateral lower extremity edema. Warm  Neuro: No focal deficit. Moves extremity spontaneously. Psychiatry: Appropriate affect. Not agitated. Skin: Warm, dry with normal turgor. No rash  Capillary refill: Brisk,< 3 seconds   Peripheral Pulses: +2 palpable, equal bilaterally       Labs/imaging/EKG:  CBC:   Recent Labs     02/25/20  1146   WBC 12.0*   HGB 12.0        BMP:    Recent Labs     02/25/20  1146      K 4.9      CO2 26   BUN 25*   CREATININE 1.3*   GLUCOSE 112*     Xr Chest Standard (2 Vw)  Result Date: 2/25/2020  EXAMINATION: TWO XRAY VIEWS OF THE CHEST 2/25/2020 12:08 pm COMPARISON: 02/15/2020 HISTORY: ORDERING SYSTEM PROVIDED HISTORY: Shortness of breath TECHNOLOGIST PROVIDED HISTORY: Reason for exam:->Shortness of breath Reason for Exam: sob Acuity: Acute Type of Exam: Initial FINDINGS: No evidence of pneumonia, edema or other acute pulmonary process. No evidence of acute process of the cardiac or mediastinal structures.  No evidence of pneumothorax or pleural effusion. No evidence of acute cardiopulmonary disease. EKG: Atrial fibrillation, 80 beats per minutes. No acute ST/T changes. I reviewed EKG. Discussed with ER provider.       Thank you Arabella Sams MD for the opportunity to be involved in this patient's care.    -----------------------------  Hawa Whiteside MD  [facility]

## 2020-02-25 NOTE — PROGRESS NOTES
Medication Reconciliation    List of medications patient is currently taking is complete. Source of information: 1. Conversation with patient at bedside                                      2. EPIC records      Allergies  Morphine and Other     Notes regarding home medications:   1. Patient received all of her morning home medications prior to arrival to the emergency department today.     Paul Hardin PharmD, [facility]  2/25/2020 12:32 PM

## 2020-02-26 ENCOUNTER — APPOINTMENT (OUTPATIENT)
Dept: CT IMAGING | Age: 75
DRG: 286 | End: 2020-02-26
Payer: MEDICARE

## 2020-02-26 ENCOUNTER — APPOINTMENT (OUTPATIENT)
Dept: ULTRASOUND IMAGING | Age: 75
DRG: 286 | End: 2020-02-26
Payer: MEDICARE

## 2020-02-26 LAB
A/G RATIO: 1.4 (ref 1.1–2.2)
ALBUMIN SERPL-MCNC: 3.8 G/DL (ref 3.4–5)
ALP BLD-CCNC: 66 U/L (ref 40–129)
ALT SERPL-CCNC: 27 U/L (ref 10–40)
ANION GAP SERPL CALCULATED.3IONS-SCNC: 15 MMOL/L (ref 3–16)
AST SERPL-CCNC: 16 U/L (ref 15–37)
BASOPHILS ABSOLUTE: 0 K/UL (ref 0–0.2)
BASOPHILS RELATIVE PERCENT: 0.2 %
BILIRUB SERPL-MCNC: 0.4 MG/DL (ref 0–1)
BILIRUBIN URINE: NEGATIVE
BLOOD, URINE: NEGATIVE
BUN BLDV-MCNC: 35 MG/DL (ref 7–20)
CALCIUM SERPL-MCNC: 9.1 MG/DL (ref 8.3–10.6)
CHLORIDE BLD-SCNC: 94 MMOL/L (ref 99–110)
CLARITY: ABNORMAL
CO2: 27 MMOL/L (ref 21–32)
COLOR: YELLOW
CREAT SERPL-MCNC: 1.6 MG/DL (ref 0.6–1.2)
CREATININE URINE: 65.2 MG/DL (ref 28–259)
EKG ATRIAL RATE: 94 BPM
EKG DIAGNOSIS: NORMAL
EKG Q-T INTERVAL: 386 MS
EKG QRS DURATION: 84 MS
EKG QTC CALCULATION (BAZETT): 445 MS
EKG R AXIS: 3 DEGREES
EKG T AXIS: 58 DEGREES
EKG VENTRICULAR RATE: 80 BPM
EOSINOPHILS ABSOLUTE: 0 K/UL (ref 0–0.6)
EOSINOPHILS RELATIVE PERCENT: 0.1 %
EPITHELIAL CELLS, UA: 5 /HPF (ref 0–5)
GFR AFRICAN AMERICAN: 38
GFR NON-AFRICAN AMERICAN: 32
GLOBULIN: 2.8 G/DL
GLUCOSE BLD-MCNC: 127 MG/DL (ref 70–99)
GLUCOSE URINE: NEGATIVE MG/DL
HCT VFR BLD CALC: 35.1 % (ref 36–48)
HEMOGLOBIN: 11.4 G/DL (ref 12–16)
HYALINE CASTS: 4 /LPF (ref 0–8)
KETONES, URINE: NEGATIVE MG/DL
LEUKOCYTE ESTERASE, URINE: ABNORMAL
LYMPHOCYTES ABSOLUTE: 0.9 K/UL (ref 1–5.1)
LYMPHOCYTES RELATIVE PERCENT: 7.8 %
MAGNESIUM: 2 MG/DL (ref 1.8–2.4)
MCH RBC QN AUTO: 28.6 PG (ref 26–34)
MCHC RBC AUTO-ENTMCNC: 32.4 G/DL (ref 31–36)
MCV RBC AUTO: 88.3 FL (ref 80–100)
MICROALBUMIN UR-MCNC: 2.4 MG/DL
MICROALBUMIN/CREAT UR-RTO: 36.8 MG/G (ref 0–30)
MICROSCOPIC EXAMINATION: YES
MONOCYTES ABSOLUTE: 0.7 K/UL (ref 0–1.3)
MONOCYTES RELATIVE PERCENT: 6.3 %
NEUTROPHILS ABSOLUTE: 10 K/UL (ref 1.7–7.7)
NEUTROPHILS RELATIVE PERCENT: 85.6 %
NITRITE, URINE: NEGATIVE
PDW BLD-RTO: 15.7 % (ref 12.4–15.4)
PH UA: 5.5 (ref 5–8)
PHOSPHORUS: 5 MG/DL (ref 2.5–4.9)
PLATELET # BLD: 203 K/UL (ref 135–450)
PMV BLD AUTO: 9.6 FL (ref 5–10.5)
POTASSIUM SERPL-SCNC: 4.4 MMOL/L (ref 3.5–5.1)
PRO-BNP: 4858 PG/ML (ref 0–449)
PROTEIN UA: NEGATIVE MG/DL
RBC # BLD: 3.97 M/UL (ref 4–5.2)
RBC UA: 0 /HPF (ref 0–4)
SODIUM BLD-SCNC: 136 MMOL/L (ref 136–145)
SPECIFIC GRAVITY UA: 1.01 (ref 1–1.03)
TOTAL PROTEIN: 6.6 G/DL (ref 6.4–8.2)
URINE TYPE: ABNORMAL
UROBILINOGEN, URINE: 0.2 E.U./DL
WBC # BLD: 11.7 K/UL (ref 4–11)
WBC UA: 41 /HPF (ref 0–5)

## 2020-02-26 PROCEDURE — 51798 US URINE CAPACITY MEASURE: CPT

## 2020-02-26 PROCEDURE — 2060000000 HC ICU INTERMEDIATE R&B

## 2020-02-26 PROCEDURE — 6370000000 HC RX 637 (ALT 250 FOR IP): Performed by: INTERNAL MEDICINE

## 2020-02-26 PROCEDURE — 2500000003 HC RX 250 WO HCPCS: Performed by: INTERNAL MEDICINE

## 2020-02-26 PROCEDURE — 85025 COMPLETE CBC W/AUTO DIFF WBC: CPT

## 2020-02-26 PROCEDURE — 36415 COLL VENOUS BLD VENIPUNCTURE: CPT

## 2020-02-26 PROCEDURE — 6360000002 HC RX W HCPCS: Performed by: INTERNAL MEDICINE

## 2020-02-26 PROCEDURE — 76770 US EXAM ABDO BACK WALL COMP: CPT

## 2020-02-26 PROCEDURE — 83735 ASSAY OF MAGNESIUM: CPT

## 2020-02-26 PROCEDURE — 82043 UR ALBUMIN QUANTITATIVE: CPT

## 2020-02-26 PROCEDURE — 99223 1ST HOSP IP/OBS HIGH 75: CPT | Performed by: INTERNAL MEDICINE

## 2020-02-26 PROCEDURE — 84100 ASSAY OF PHOSPHORUS: CPT

## 2020-02-26 PROCEDURE — 82570 ASSAY OF URINE CREATININE: CPT

## 2020-02-26 PROCEDURE — 94761 N-INVAS EAR/PLS OXIMETRY MLT: CPT

## 2020-02-26 PROCEDURE — 71250 CT THORAX DX C-: CPT

## 2020-02-26 PROCEDURE — 80053 COMPREHEN METABOLIC PANEL: CPT

## 2020-02-26 PROCEDURE — 81001 URINALYSIS AUTO W/SCOPE: CPT

## 2020-02-26 PROCEDURE — 2580000003 HC RX 258: Performed by: INTERNAL MEDICINE

## 2020-02-26 PROCEDURE — 94640 AIRWAY INHALATION TREATMENT: CPT

## 2020-02-26 PROCEDURE — 83880 ASSAY OF NATRIURETIC PEPTIDE: CPT

## 2020-02-26 PROCEDURE — 93010 ELECTROCARDIOGRAM REPORT: CPT | Performed by: INTERNAL MEDICINE

## 2020-02-26 RX ORDER — AMIODARONE HYDROCHLORIDE 200 MG/1
200 TABLET ORAL 2 TIMES DAILY
Status: DISCONTINUED | OUTPATIENT
Start: 2020-02-26 | End: 2020-02-29 | Stop reason: HOSPADM

## 2020-02-26 RX ORDER — PREDNISONE 20 MG/1
40 TABLET ORAL DAILY
Status: DISCONTINUED | OUTPATIENT
Start: 2020-02-26 | End: 2020-02-29 | Stop reason: HOSPADM

## 2020-02-26 RX ORDER — AMIODARONE HYDROCHLORIDE 200 MG/1
200 TABLET ORAL DAILY
Status: DISCONTINUED | OUTPATIENT
Start: 2020-03-04 | End: 2020-02-29 | Stop reason: HOSPADM

## 2020-02-26 RX ORDER — AMLODIPINE BESYLATE 5 MG/1
5 TABLET ORAL DAILY
Status: DISCONTINUED | OUTPATIENT
Start: 2020-02-26 | End: 2020-02-27

## 2020-02-26 RX ORDER — HYDRALAZINE HYDROCHLORIDE 25 MG/1
25 TABLET, FILM COATED ORAL EVERY 8 HOURS SCHEDULED
Status: DISCONTINUED | OUTPATIENT
Start: 2020-02-26 | End: 2020-02-27

## 2020-02-26 RX ADMIN — ARFORMOTEROL TARTRATE 15 MCG: 15 SOLUTION RESPIRATORY (INHALATION) at 20:49

## 2020-02-26 RX ADMIN — ALBUTEROL SULFATE 2.5 MG: 2.5 SOLUTION RESPIRATORY (INHALATION) at 20:49

## 2020-02-26 RX ADMIN — LABETALOL HYDROCHLORIDE 10 MG: 5 INJECTION INTRAVENOUS at 06:10

## 2020-02-26 RX ADMIN — ALBUTEROL SULFATE 2.5 MG: 2.5 SOLUTION RESPIRATORY (INHALATION) at 15:52

## 2020-02-26 RX ADMIN — HYDRALAZINE HYDROCHLORIDE 25 MG: 25 TABLET, FILM COATED ORAL at 21:12

## 2020-02-26 RX ADMIN — BUDESONIDE AND FORMOTEROL FUMARATE DIHYDRATE 2 PUFF: 160; 4.5 AEROSOL RESPIRATORY (INHALATION) at 20:50

## 2020-02-26 RX ADMIN — HYDRALAZINE HYDROCHLORIDE 25 MG: 25 TABLET, FILM COATED ORAL at 15:36

## 2020-02-26 RX ADMIN — PREDNISONE 40 MG: 20 TABLET ORAL at 15:36

## 2020-02-26 RX ADMIN — LISINOPRIL 10 MG: 10 TABLET ORAL at 09:21

## 2020-02-26 RX ADMIN — RIVAROXABAN 20 MG: 20 TABLET, FILM COATED ORAL at 09:21

## 2020-02-26 RX ADMIN — BUDESONIDE 500 MCG: 0.5 SUSPENSION RESPIRATORY (INHALATION) at 07:45

## 2020-02-26 RX ADMIN — AMIODARONE HYDROCHLORIDE 200 MG: 200 TABLET ORAL at 12:05

## 2020-02-26 RX ADMIN — ALBUTEROL SULFATE 2.5 MG: 2.5 SOLUTION RESPIRATORY (INHALATION) at 07:46

## 2020-02-26 RX ADMIN — SODIUM CHLORIDE, PRESERVATIVE FREE 10 ML: 5 INJECTION INTRAVENOUS at 09:23

## 2020-02-26 RX ADMIN — ARFORMOTEROL TARTRATE 15 MCG: 15 SOLUTION RESPIRATORY (INHALATION) at 07:45

## 2020-02-26 RX ADMIN — ISOSORBIDE MONONITRATE 30 MG: 30 TABLET, EXTENDED RELEASE ORAL at 09:22

## 2020-02-26 RX ADMIN — FUROSEMIDE 40 MG: 10 INJECTION, SOLUTION INTRAMUSCULAR; INTRAVENOUS at 09:21

## 2020-02-26 RX ADMIN — METOPROLOL SUCCINATE 50 MG: 50 TABLET, EXTENDED RELEASE ORAL at 09:22

## 2020-02-26 RX ADMIN — AMIODARONE HYDROCHLORIDE 200 MG: 200 TABLET ORAL at 21:12

## 2020-02-26 RX ADMIN — BUDESONIDE AND FORMOTEROL FUMARATE DIHYDRATE 2 PUFF: 160; 4.5 AEROSOL RESPIRATORY (INHALATION) at 07:45

## 2020-02-26 RX ADMIN — SODIUM CHLORIDE, PRESERVATIVE FREE 10 ML: 5 INJECTION INTRAVENOUS at 21:12

## 2020-02-26 RX ADMIN — AMLODIPINE BESYLATE 5 MG: 5 TABLET ORAL at 15:36

## 2020-02-26 RX ADMIN — ALBUTEROL SULFATE 2.5 MG: 2.5 SOLUTION RESPIRATORY (INHALATION) at 11:52

## 2020-02-26 RX ADMIN — LABETALOL HYDROCHLORIDE 10 MG: 5 INJECTION INTRAVENOUS at 23:51

## 2020-02-26 RX ADMIN — CLONAZEPAM 0.5 MG: 0.5 TABLET ORAL at 23:48

## 2020-02-26 RX ADMIN — BUDESONIDE 500 MCG: 0.5 SUSPENSION RESPIRATORY (INHALATION) at 20:49

## 2020-02-26 ASSESSMENT — PAIN SCALES - GENERAL
PAINLEVEL_OUTOF10: 0

## 2020-02-26 NOTE — PROGRESS NOTES
Assessment complete; Patient resting comfortably. Patient ambulated to get daily weight. Patient safely back in bed. Patient denies any needs at this time. Will continue to monitor patient.

## 2020-02-26 NOTE — PROGRESS NOTES
Hospitalist Progress Note    CC: COPD exacerbation [facility])    Hospital course:  67yo WF with PMHx sig for obesity, chronic diastolic CHF with EF of 73-37%, HTN, CKDIII, CAD, COPD, AAA, afib on xarelto, presents with worsening SOB with minimal exertion. Pt states that her weight is up and down and that she is addicted to [employer]. Pt started on IV lasix, but also treated for COPD Exacerbation. Breathing improving. Admit date: 2/25/2020  Days in hospital:  1    24 Hour Events: breathing improved after 1.6L diuresis and nebulizer treatments    Subjective: breathing better today    ROS:   A comprehensive review of systems was negative except for: improving SOB . Objective:    BP (!) 171/75   Pulse 58   Temp 98.7 °F (37.1 °C) (Oral)   Resp 18   Ht 5' 4\" (1.626 m)   Wt 222 lb 7.1 oz (100.9 kg)   SpO2 97%   BMI 38.18 kg/m²     Gen: obese, some SOB with exertion noted  HEENT: NC/AT, moist mucous membranes, no oropharyngeal erythema or exudate  Neck: supple, trachea midline, no anterior cervical or SC LAD  Heart:  Normal s1/s2, RRR, no murmurs, gallops, or rubs. 1+ pitting leg edema  Lungs:  diminished bilaterally, no wheeze, no rales, no rhonchi, no crackles, no use of accessory muscles  Abd: bowel sounds present, soft, nontender, nondistended, no masses  Extrem:  No clubbing, cyanosis,  1+ pitting leg edema  Skin: no rashes or lesions  Psych: A & O x3  Neuro: grossly intact, moves all four extremities. Assessment:    Principal Problem:    COPD exacerbation (HCC)  Active Problems:    Coronary artery disease    Acute on chronic diastolic heart failure (HCC)    AAA (abdominal aortic aneurysm) (HCC)    PAF (paroxysmal atrial fibrillation) ([address]  Resolved Problems:    * No resolved hospital problems. *      Plan:  1. COPD exac - on nebs, receive one dose of solumedrol, tolerating albuterol nebs - CT chest ordered  2.   HTN uncontrolled - improving now on norvasc, metoprolol 50mg daily, hydralazine - no ACE or ARB due to renal failure  3. Acute on chronic diastolic CHF - will stop diuretics now due to creatinine increasing and more likely COPD playing role in shortness of breath  4.  afib - paroxysmal - started on amiodarone by cardiology - continue with xarelto and monitor renal function  5. CKDIII - pt had scheduled outpt appt today - will have them to see as well    Prognosis:  Good    Code status:  FULL    DVT prophylaxis: [] Lovenox  [] SQ Heparin  [] SCDs because of  [x] warfarin/oral direct thrombin inhibitor [] Encourage ambulation  GI prophylaxis: [] [id number]  [x] not indicated  Antibiotic prophylaxis indicated:   No  Diet:  DIET CARDIAC; Low Sodium (2 GM);  Daily Fluid Restriction: 1500 ml    Disposition:  [] Home  [x] Home with home health [] Rehab [] Psych [] SNF  [] LTAC  [] Long term nursing home or group home [] Transfer to ICU  [] Transfer to PCU [] Other:     Medications:  Scheduled Meds:   amiodarone  200 mg Oral BID    Followed by   Federico Sanchez ON 3/4/2020] amiodarone  200 mg Oral Daily    hydrALAZINE  25 mg Oral 3 times per day    amLODIPine  5 mg Oral Daily    predniSONE  40 mg Oral Daily    albuterol  2.5 mg Nebulization 4x daily    budesonide  500 mcg Nebulization BID    isosorbide mononitrate  30 mg Oral Daily    metoprolol succinate  50 mg Oral Daily    rivaroxaban  20 mg Oral Daily with breakfast    rosuvastatin  40 mg Oral Daily    budesonide-formoterol  2 puff Inhalation BID    sodium chloride flush  10 mL Intravenous 2 times per day    Arformoterol Tartrate  15 mcg Nebulization BID       PRN Meds:  albuterol, albuterol sulfate HFA, clonazePAM, sodium chloride flush, acetaminophen, acetaminophen, polyethylene glycol, promethazine, ondansetron, labetalol    IV:        Intake/Output Summary (Last 24 hours) at 2/26/2020 1338  Last data filed at 2/26/2020 0922  Gross per 24 hour   Intake 840 ml   Output 2500 ml   Net -1660

## 2020-02-26 NOTE — PROGRESS NOTES
Pt. Ambulated from bed to chair with standby assist.  Call light in reach, chair alarm set. Pt. Denies any needs at the moment will continue to monitor.

## 2020-02-26 NOTE — PROGRESS NOTES
Assessment complete; Patient complaining of not being able to sleep. Patient medicate with appropriate medication. Patient blood pressure elevate. Notified Flaquita Borges NP. No new orders at this time. Patient medicate with PRN dose for high blood pressure. No need for call back of new blood pressure per Milton Cade NP. Patient tolerated BP medication well. Patient resting comfortable. Will continue to monitor.

## 2020-02-26 NOTE — PLAN OF CARE
Problem: Falls - Risk of:  Goal: Will remain free from falls  Description  Will remain free from falls  2/26/2020 1249 by Vida Napoles RN  Outcome: Ongoing  Fall risk assessment completed every shift. All precautions in place- bed in lowest position with wheels locked, bed alarm in place and activated, non-skid socks on pt, fall risk ID on pt, call dont fall sign posted. Pt has call light within reach at all times. Room clear of clutter. Pt aware to call for assistance when getting up. Will continue to monitor.     2/26/2020 0415 by Sarabjit Helton RN  Outcome: Ongoing  Goal: Absence of physical injury  Description  Absence of physical injury  2/26/2020 1249 by Vida Napoles RN  Outcome: Ongoing  2/26/2020 0415 by Sarabjit Helton RN  Outcome: Ongoing     Problem: OXYGENATION/RESPIRATORY FUNCTION  Goal: Patient will maintain patent airway  2/26/2020 1249 by Vida Napoles RN  Outcome: Ongoing  On room air, LI    2/26/2020 0415 by Sarabjit Helton RN  Outcome: Ongoing  Goal: Patient will achieve/maintain normal respiratory rate/effort  Description  Respiratory rate and effort will be within normal limits for the patient  2/26/2020 1249 by Vida Napoles RN  Outcome: Ongoing  2/26/2020 0415 by Sarabjit Helton RN  Outcome: Ongoing     Problem: HEMODYNAMIC STATUS  Goal: Patient has stable vital signs and fluid balance  2/26/2020 1249 by Vida Napoles RN  Outcome: Ongoing  2/26/2020 0415 by Sarabjit Helton RN  Outcome: Ongoing     Problem: FLUID AND ELECTROLYTE IMBALANCE  Goal: Fluid and electrolyte balance are achieved/maintained  2/26/2020 1249 by Vida Napoles RN  Outcome: Ongoing  2/26/2020 0415 by Sarabjit Helton RN  Outcome: Ongoing     Problem: ACTIVITY INTOLERANCE/IMPAIRED MOBILITY  Goal: Mobility/activity is maintained at optimum level for patient  2/26/2020 1249 by Vida Napoles RN  Outcome: Ongoing  2/26/2020 0415 by Sarabjit Helton RN  Outcome: Ongoing     Problem: Ongoing  Skin assessment completed every shift. Pt assessed for incontinence, appropriate barrier cream applied prn. Pt encouraged to turn/rotate every 2 hours. Assistance provided if pt unable to do so themselves.       2/26/2020 0415 by Kayce Malone RN  Outcome: Ongoing     Problem: Discharge Planning:  Goal: Patients continuum of care needs are met  Description  Patients continuum of care needs are met  2/26/2020 1249 by Anuel Thompson RN  Outcome: Ongoing  2/26/2020 0415 by Kayce Malone RN  Outcome: Ongoing

## 2020-02-26 NOTE — PROGRESS NOTES
Nutrition Education    Type and Reason for Visit: Initial, Patient Education    Patient was given an education on a low sodium diet for her CHF. Patient understood why she was on the diet and was ready to start making changes. Patient reported eating at fast food restaurants frequently, and recognized that she needed to eat out less. Low sodium foods were discussed. Patient stated that she'll try to start making lower sodium meals and then freezing them, so can can reheat and eat them throughout the week. · Verbally reviewed information with    PATIENT   · Written educational materials provided. · Contact name and number provided. · Refer to Patient Education activity for more details.     Electronically signed by Yessi Miranda on 2/26/20 at 11:18 AM    Contact Number: [phone] - [phone]

## 2020-02-27 LAB
ALBUMIN SERPL-MCNC: 3.7 G/DL (ref 3.4–5)
ANION GAP SERPL CALCULATED.3IONS-SCNC: 16 MMOL/L (ref 3–16)
BUN BLDV-MCNC: 51 MG/DL (ref 7–20)
CALCIUM SERPL-MCNC: 8.6 MG/DL (ref 8.3–10.6)
CHLORIDE BLD-SCNC: 97 MMOL/L (ref 99–110)
CO2: 25 MMOL/L (ref 21–32)
CREAT SERPL-MCNC: 1.8 MG/DL (ref 0.6–1.2)
GFR AFRICAN AMERICAN: 33
GFR NON-AFRICAN AMERICAN: 27
GLUCOSE BLD-MCNC: 153 MG/DL (ref 70–99)
HCT VFR BLD CALC: 34.7 % (ref 36–48)
HEMOGLOBIN: 11.4 G/DL (ref 12–16)
MAGNESIUM: 2.2 MG/DL (ref 1.8–2.4)
MCH RBC QN AUTO: 29.1 PG (ref 26–34)
MCHC RBC AUTO-ENTMCNC: 32.8 G/DL (ref 31–36)
MCV RBC AUTO: 88.5 FL (ref 80–100)
PDW BLD-RTO: 15.8 % (ref 12.4–15.4)
PHOSPHORUS: 5.1 MG/DL (ref 2.5–4.9)
PLATELET # BLD: 194 K/UL (ref 135–450)
PMV BLD AUTO: 9.3 FL (ref 5–10.5)
POTASSIUM SERPL-SCNC: 4.4 MMOL/L (ref 3.5–5.1)
RBC # BLD: 3.92 M/UL (ref 4–5.2)
SODIUM BLD-SCNC: 138 MMOL/L (ref 136–145)
URIC ACID, SERUM: 10.2 MG/DL (ref 2.6–6)
WBC # BLD: 13 K/UL (ref 4–11)

## 2020-02-27 PROCEDURE — 2060000000 HC ICU INTERMEDIATE R&B

## 2020-02-27 PROCEDURE — 6370000000 HC RX 637 (ALT 250 FOR IP): Performed by: INTERNAL MEDICINE

## 2020-02-27 PROCEDURE — 83735 ASSAY OF MAGNESIUM: CPT

## 2020-02-27 PROCEDURE — 94761 N-INVAS EAR/PLS OXIMETRY MLT: CPT

## 2020-02-27 PROCEDURE — 80069 RENAL FUNCTION PANEL: CPT

## 2020-02-27 PROCEDURE — 2580000003 HC RX 258: Performed by: INTERNAL MEDICINE

## 2020-02-27 PROCEDURE — 36415 COLL VENOUS BLD VENIPUNCTURE: CPT

## 2020-02-27 PROCEDURE — 84550 ASSAY OF BLOOD/URIC ACID: CPT

## 2020-02-27 PROCEDURE — 6360000002 HC RX W HCPCS: Performed by: INTERNAL MEDICINE

## 2020-02-27 PROCEDURE — 85027 COMPLETE CBC AUTOMATED: CPT

## 2020-02-27 PROCEDURE — 94640 AIRWAY INHALATION TREATMENT: CPT

## 2020-02-27 RX ORDER — HYDRALAZINE HYDROCHLORIDE 50 MG/1
50 TABLET, FILM COATED ORAL EVERY 8 HOURS SCHEDULED
Status: DISCONTINUED | OUTPATIENT
Start: 2020-02-27 | End: 2020-02-29 | Stop reason: HOSPADM

## 2020-02-27 RX ORDER — SODIUM CHLORIDE 0.9 % (FLUSH) 0.9 %
10 SYRINGE (ML) INJECTION PRN
Status: CANCELLED | OUTPATIENT
Start: 2020-02-27

## 2020-02-27 RX ORDER — SODIUM CHLORIDE 0.9 % (FLUSH) 0.9 %
10 SYRINGE (ML) INJECTION EVERY 12 HOURS SCHEDULED
Status: CANCELLED | OUTPATIENT
Start: 2020-02-27

## 2020-02-27 RX ORDER — NIFEDIPINE 30 MG/1
30 TABLET, EXTENDED RELEASE ORAL DAILY
Status: DISCONTINUED | OUTPATIENT
Start: 2020-02-27 | End: 2020-02-29 | Stop reason: HOSPADM

## 2020-02-27 RX ORDER — HYDRALAZINE HYDROCHLORIDE 20 MG/ML
10 INJECTION INTRAMUSCULAR; INTRAVENOUS EVERY 6 HOURS PRN
Status: DISCONTINUED | OUTPATIENT
Start: 2020-02-27 | End: 2020-02-29 | Stop reason: HOSPADM

## 2020-02-27 RX ADMIN — RIVAROXABAN 20 MG: 20 TABLET, FILM COATED ORAL at 09:44

## 2020-02-27 RX ADMIN — BUDESONIDE 500 MCG: 0.5 SUSPENSION RESPIRATORY (INHALATION) at 19:41

## 2020-02-27 RX ADMIN — ALBUTEROL SULFATE 2.5 MG: 2.5 SOLUTION RESPIRATORY (INHALATION) at 15:59

## 2020-02-27 RX ADMIN — SODIUM CHLORIDE, PRESERVATIVE FREE 10 ML: 5 INJECTION INTRAVENOUS at 21:21

## 2020-02-27 RX ADMIN — ISOSORBIDE MONONITRATE 30 MG: 30 TABLET, EXTENDED RELEASE ORAL at 09:33

## 2020-02-27 RX ADMIN — CLONAZEPAM 0.5 MG: 0.5 TABLET ORAL at 23:36

## 2020-02-27 RX ADMIN — ARFORMOTEROL TARTRATE 15 MCG: 15 SOLUTION RESPIRATORY (INHALATION) at 07:47

## 2020-02-27 RX ADMIN — METOPROLOL SUCCINATE 50 MG: 50 TABLET, EXTENDED RELEASE ORAL at 09:35

## 2020-02-27 RX ADMIN — NIFEDIPINE 30 MG: 30 TABLET, FILM COATED, EXTENDED RELEASE ORAL at 13:12

## 2020-02-27 RX ADMIN — HYDRALAZINE HYDROCHLORIDE 25 MG: 25 TABLET, FILM COATED ORAL at 05:27

## 2020-02-27 RX ADMIN — ALBUTEROL SULFATE 2.5 MG: 2.5 SOLUTION RESPIRATORY (INHALATION) at 19:41

## 2020-02-27 RX ADMIN — BUDESONIDE AND FORMOTEROL FUMARATE DIHYDRATE 2 PUFF: 160; 4.5 AEROSOL RESPIRATORY (INHALATION) at 07:47

## 2020-02-27 RX ADMIN — HYDRALAZINE HYDROCHLORIDE 50 MG: 50 TABLET, FILM COATED ORAL at 13:12

## 2020-02-27 RX ADMIN — AMLODIPINE BESYLATE 5 MG: 5 TABLET ORAL at 11:39

## 2020-02-27 RX ADMIN — AMIODARONE HYDROCHLORIDE 200 MG: 200 TABLET ORAL at 21:21

## 2020-02-27 RX ADMIN — ARFORMOTEROL TARTRATE 15 MCG: 15 SOLUTION RESPIRATORY (INHALATION) at 19:40

## 2020-02-27 RX ADMIN — SODIUM CHLORIDE, PRESERVATIVE FREE 10 ML: 5 INJECTION INTRAVENOUS at 09:34

## 2020-02-27 RX ADMIN — BUDESONIDE 500 MCG: 0.5 SUSPENSION RESPIRATORY (INHALATION) at 07:48

## 2020-02-27 RX ADMIN — HYDRALAZINE HYDROCHLORIDE 50 MG: 50 TABLET, FILM COATED ORAL at 21:21

## 2020-02-27 RX ADMIN — AMIODARONE HYDROCHLORIDE 200 MG: 200 TABLET ORAL at 09:33

## 2020-02-27 RX ADMIN — PREDNISONE 40 MG: 20 TABLET ORAL at 09:33

## 2020-02-27 RX ADMIN — ALBUTEROL SULFATE 2.5 MG: 2.5 SOLUTION RESPIRATORY (INHALATION) at 07:48

## 2020-02-27 RX ADMIN — BUDESONIDE AND FORMOTEROL FUMARATE DIHYDRATE 2 PUFF: 160; 4.5 AEROSOL RESPIRATORY (INHALATION) at 19:41

## 2020-02-27 RX ADMIN — ALBUTEROL SULFATE 2.5 MG: 2.5 SOLUTION RESPIRATORY (INHALATION) at 12:34

## 2020-02-27 ASSESSMENT — PAIN SCALES - GENERAL
PAINLEVEL_OUTOF10: 0

## 2020-02-27 NOTE — PLAN OF CARE
Problem: Falls - Risk of:  Goal: Will remain free from falls  Description  Will remain free from falls  2/26/2020 2100 by Mady De La Cruz RN  Outcome: Ongoing  2/26/2020 1249 by Sharron Suh RN  Outcome: Ongoing  Goal: Absence of physical injury  Description  Absence of physical injury  2/26/2020 2100 by Mady De La Cruz RN  Outcome: Ongoing  2/26/2020 1249 by Sharron Suh RN  Outcome: Ongoing     Problem: OXYGENATION/RESPIRATORY FUNCTION  Goal: Patient will maintain patent airway  2/26/2020 2100 by Mady De La Cruz RN  Outcome: Ongoing  2/26/2020 1249 by Sharron Suh RN  Outcome: Ongoing  Goal: Patient will achieve/maintain normal respiratory rate/effort  Description  Respiratory rate and effort will be within normal limits for the patient  2/26/2020 2100 by Mady De La Cruz RN  Outcome: Ongoing  2/26/2020 1249 by Sharron Suh RN  Outcome: Ongoing     Problem: HEMODYNAMIC STATUS  Goal: Patient has stable vital signs and fluid balance  2/26/2020 2100 by Mady De La Cruz RN  Outcome: Ongoing  2/26/2020 1249 by Sharron Suh RN  Outcome: Ongoing     Problem: FLUID AND ELECTROLYTE IMBALANCE  Goal: Fluid and electrolyte balance are achieved/maintained  2/26/2020 2100 by Mady De La Cruz RN  Outcome: Ongoing  2/26/2020 1249 by Sharron Suh RN  Outcome: Ongoing     Problem: ACTIVITY INTOLERANCE/IMPAIRED MOBILITY  Goal: Mobility/activity is maintained at optimum level for patient  2/26/2020 2100 by Mady De La Cruz RN  Outcome: Ongoing  2/26/2020 1249 by Sharron Suh RN  Outcome: Ongoing     Problem: Infection:  Goal: Will remain free from infection  Description  Will remain free from infection  2/26/2020 2100 by Mady De La Cruz RN  Outcome: Ongoing  2/26/2020 1249 by Sharron Suh RN  Outcome: Ongoing     Problem: Safety:  Goal: Free from accidental physical injury  Description  Free from accidental physical injury  2/26/2020 2100 by Mady De La Cruz RN  Outcome: Ongoing  2/26/2020 1249 by Miriam Abreu RN  Outcome: Ongoing  Goal: Free from intentional harm  Description  Free from intentional harm  2/26/2020 2100 by Gretchen Cronin RN  Outcome: Ongoing  2/26/2020 1249 by Miriam Abreu RN  Outcome: Ongoing     Problem: Daily Care:  Goal: Daily care needs are met  Description  Daily care needs are met  2/26/2020 2100 by Gretchen Cronin RN  Outcome: Ongoing  2/26/2020 1249 by Miriam Abreu RN  Outcome: Ongoing     Problem: Pain:  Goal: Patient's pain/discomfort is manageable  Description  Patient's pain/discomfort is manageable  2/26/2020 2100 by Gretchen Cronin RN  Outcome: Ongoing  2/26/2020 1249 by Miriam Abreu RN  Outcome: Ongoing     Problem: Skin Integrity:  Goal: Skin integrity will stabilize  Description  Skin integrity will stabilize  2/26/2020 2100 by Gretchen Cronin RN  Outcome: Ongoing  2/26/2020 1249 by Miriam Abreu RN  Outcome: Ongoing     Problem: Discharge Planning:  Goal: Patients continuum of care needs are met  Description  Patients continuum of care needs are met  2/26/2020 2100 by Gretchen Cronin RN  Outcome: Ongoing  2/26/2020 1249 by Miriam Abreu RN  Outcome: Ongoing

## 2020-02-27 NOTE — PLAN OF CARE
Problem: Falls - Risk of:  Goal: Will remain free from falls  Description  Will remain free from falls  2/27/2020 1058 by Nathanael Aguirre RN  Outcome: Ongoing  2/26/2020 2100 by Jersey Putnam RN  Outcome: Ongoing  Goal: Absence of physical injury  Description  Absence of physical injury  2/27/2020 1058 by Nathanael Aguirre RN  Outcome: Ongoing  2/26/2020 2100 by Jersey Putnam RN  Outcome: Ongoing     Problem: OXYGENATION/RESPIRATORY FUNCTION  Goal: Patient will maintain patent airway  2/27/2020 1058 by Nathanael Aguirre RN  Outcome: Ongoing  2/26/2020 2100 by Jersey Putnam RN  Outcome: Ongoing  Goal: Patient will achieve/maintain normal respiratory rate/effort  Description  Respiratory rate and effort will be within normal limits for the patient  2/27/2020 1058 by Nathanael Aguirre RN  Outcome: Ongoing  2/26/2020 2100 by Jersey Putnam RN  Outcome: Ongoing     Problem: HEMODYNAMIC STATUS  Goal: Patient has stable vital signs and fluid balance  2/27/2020 1058 by Nathanael Aguirre RN  Outcome: Ongoing  2/26/2020 2100 by Jersey Putnam RN  Outcome: Ongoing     Problem: FLUID AND ELECTROLYTE IMBALANCE  Goal: Fluid and electrolyte balance are achieved/maintained  2/27/2020 1058 by Nathanael Aguirre RN  Outcome: Ongoing  2/26/2020 2100 by Jersey Putnam RN  Outcome: Ongoing     Problem: ACTIVITY INTOLERANCE/IMPAIRED MOBILITY  Goal: Mobility/activity is maintained at optimum level for patient  2/27/2020 1058 by Nathanael Aguirre RN  Outcome: Ongoing  2/26/2020 2100 by Jersey Putnam RN  Outcome: Ongoing     Problem: Infection:  Goal: Will remain free from infection  Description  Will remain free from infection  2/27/2020 1058 by Nathanael Aguirre RN  Outcome: Ongoing  2/26/2020 2100 by Jersey Putnam RN  Outcome: Ongoing     Problem: Safety:  Goal: Free from accidental physical injury  Description  Free from accidental physical injury  2/27/2020 1058 by Nathanael Aguirre RN  Outcome: Ongoing  2/26/2020 2100 by Shayna Bryan RN  Outcome: Ongoing  Goal: Free from intentional harm  Description  Free from intentional harm  2/27/2020 1058 by Shakir Henriquez RN  Outcome: Ongoing  2/26/2020 2100 by Shayna Bryan RN  Outcome: Ongoing     Problem: Daily Care:  Goal: Daily care needs are met  Description  Daily care needs are met  2/27/2020 1058 by Shakir Henriquez RN  Outcome: Ongoing  2/26/2020 2100 by Shayna Bryan RN  Outcome: Ongoing     Problem: Pain:  Goal: Patient's pain/discomfort is manageable  Description  Patient's pain/discomfort is manageable  2/27/2020 1058 by Shakir Henriquez RN  Outcome: Ongoing  2/26/2020 2100 by Shayna Bryan RN  Outcome: Ongoing     Problem: Skin Integrity:  Goal: Skin integrity will stabilize  Description  Skin integrity will stabilize  2/27/2020 1058 by Shakir Henriquez RN  Outcome: Ongoing  2/26/2020 2100 by Shayna Bryan RN  Outcome: Ongoing     Problem: Discharge Planning:  Goal: Patients continuum of care needs are met  Description  Patients continuum of care needs are met  2/27/2020 1058 by Shakir Henriquez RN  Outcome: Ongoing  2/26/2020 2100 by Shayna Bryan RN  Outcome: Ongoing

## 2020-02-27 NOTE — PROGRESS NOTES
Hospitalist Progress Note      PCP: Warren Garcia MD    Date of Admission: 2/25/2020        Subjective:   Feels better today. Shortness of breath is improved. No orthopnea PND. No leg swelling      Medications:  Reviewed    Infusion Medications   Scheduled Medications    hydrALAZINE  50 mg Oral 3 times per day    amiodarone  200 mg Oral BID    Followed by   Leila See ON 3/4/2020] amiodarone  200 mg Oral Daily    amLODIPine  5 mg Oral Daily    predniSONE  40 mg Oral Daily    albuterol  2.5 mg Nebulization 4x daily    budesonide  500 mcg Nebulization BID    isosorbide mononitrate  30 mg Oral Daily    metoprolol succinate  50 mg Oral Daily    rivaroxaban  20 mg Oral Daily with breakfast    rosuvastatin  40 mg Oral Daily    budesonide-formoterol  2 puff Inhalation BID    sodium chloride flush  10 mL Intravenous 2 times per day    Arformoterol Tartrate  15 mcg Nebulization BID     PRN Meds: albuterol, albuterol sulfate HFA, clonazePAM, sodium chloride flush, acetaminophen, acetaminophen, polyethylene glycol, promethazine, ondansetron, labetalol      Intake/Output Summary (Last 24 hours) at 2/27/2020 0947  Last data filed at 2/26/2020 1931  Gross per 24 hour   Intake 360 ml   Output 50 ml   Net 310 ml       Exam:    BP (!) 194/86 Comment: manual  Pulse 58   Temp 97.8 °F (36.6 °C) (Oral)   Resp 20   Ht 5' 4\" (1.626 m)   Wt 223 lb 15.8 oz (101.6 kg)   SpO2 95%   BMI 38.45 kg/m²     General appearance: No apparent distress, appears stated age and cooperative. HEENT: Pupils equal, round, and reactive to light. Conjunctivae/corneas clear. Neck: Supple, with full range of motion. No jugular venous distention. Trachea midline. Respiratory:  Normal respiratory effort. Clear to auscultation, bilaterally without Rales/Wheezes/Rhonchi. Cardiovascular: Regular rate and rhythm with normal S1/S2 without murmurs, rubs or gallops.   Abdomen: Soft, non-tender, non-distended with normal bowel hydralazine, metoprolol,imdur--management per nephrology  Closely monitor renal function and avoid nephrotoxic medications  Educated patient on need for CPAP treatment for his sleep apnea but she declines  Continue bronchodilator therapy and short course of steroids for COPD    DVT Prophylaxis: Xarelto  Diet: DIET CARDIAC; Low Sodium (2 GM);  Daily Fluid Restriction: 1500 ml  Diet NPO Time Specified Exceptions are: Sips with Meds  Code Status: Full Code      Kofi Schulz MD

## 2020-02-27 NOTE — CONSULTS
estimated to be 50-60%. Indeterminate diastolic function. Mild aortic regurgitation. Mild tricuspid regurgitation. Mild mitral regurgitation with mitral annular calcification      Assessment:     CONSULTS:   IP CONSULT TO HOSPITALIST  IP CONSULT TO DIETITIAN  IP CONSULT TO SPIRITUAL SERVICES  IP CONSULT TO CARDIOLOGY  IP CONSULT TO HEART FAILURE NURSE/COORDINATOR  IP CONSULT TO NEPHROLOGY  IP CONSULT TO DIETITIAN    Patient has a CARDIOLOGY CONSULT: Yes      Patient taking an ACEI/ARB:  No: CKD       Patient taking a BETA BLOCKER:  Yes    SCALE AVAILABLE:  Yes     EDUCATION STATUS: Patient and Patient's spouse and daughter present  [x]  Provided both written and verbal education on Heart Failure signs/symptoms. [x]  Provided instructions on daily medications. [x]  Provided instructions to monitor and record weight daily. [x]  Provided instructions to call if weight increases 3 lbs in one day or 5 lbs in one week. [x]  Received verbal acknowledgment/understanding of Heart Failure related causes. [x]  Provided instructions on how to maintain a low sodium diet. []  Provided recommendations for smoking cessation programs  [x]  Provided recommendations on activity and exercise    [x]  Other: HF RN consult received from Dr Cyndie Hebert. Pt is also being coded HF per CDS HF list as of today, 2/27. Chart reviewed. Pt presented to ED via EMS with c/o SOB x 2 days. She endorsed LI and nonproductive cough. She exhibited hansa LE edema. Pt was very hypertensive on arrival 202/113. Pt carries a history of diastolic HF and follows with [facility]. Her primary cardiologist is Dr Lyudmila English. She was last seen in office on 2/5/2020 by Don Sims NP. Pt had recently completed course of steroids / antibiotics for brochitis. Weight was 224# which was up 5# from 1/3/2020 OV. She attributed this weight gain to recent steroids. She reported her SOB had improved with abx.  Pt was counseled (again) on importance of mediation compliance. This appt was in follow up to 1/3/2020 with Dr Drea Arce where pt was experiencing worsening SOB. Labs done 1/2/2020 showed Cr 1.6 (her upper baseline) and pBNP 7859. Pt reported she did not take Lasix as directed. She was counseled on importance of diuretic compliance at that time. Lizbeth Kil 219#. ProBNP on admission was 5941 with Cr 1.3. CXR showed no evidence of cardiopulmonary disease. Weight 235# (ED) and 224# (floor). Pt was treated with IV bolus Lasix and Cardiology consulted. Pt was had AF with controlled rate (new finding). Pt has not responded well to Lasix. She has diuresed ~1 liter net but remains with s/sx of fluid overload. Cr bumped to 1.8 (>1.6 >1.3). Plan is for RHC tomorrow per Dr Drea Arce to determine volume status. I met with patient in her room. Spouse and daughter present. All are agreeable to spend time with me for HF education. Pt is on room air and appears relatively comfortable with speech and light conversation. Pt exhibits 1+ LE edema and large distended abdomen. She reports her abdomen is much worse today. She agrees with the above summary of events leading to admission. She states \"I have learned I need to call the doctor first instead of running to [facility] or [facility] for this medicine and that\". Pt is aware of her HF diagnosis and understands it is complicated by both her lung disease and CKD. We reviewed the general definition of diastolic HF and the need for lifelong ongoing self management / care. We discussed how differentiating from lung issues vs heart issues can be difficult. Pt endorses LE edema and particularly noticed her abdominal fullness. We reviewed expectations of RHC and how this will help guide her treatment. Pt has a scale at home and weighs herself \"most days\". She admits she did \"get away from it\" when she became frustrated with unsuccessful weight loss attempt.   We discussed importance of weight monitoring from HF perspective. I provided weight log and corresponding AHA HF zones tool. We discussed the 3/5 rule and s/sx of worsening HF to report. Pt's  readily summarized and taught back information. Praise given. Pt states she does not use added salt. She claims she uses a lot of pepper and is open to variety of alternative seasonings. She admits \"I think I got off track eating out too much\". She reports they typically eat out on Mondays and Wednesdays. We discussed the zone tool as a tool for HCPs but also for her. We reviewed if her weight is up on Tuesday morning, she should omit whatever she had on Monday night as \"safe\" food option. She questions how to make \"the right\" salad. We discussed foods to avoid : olives, pickles, ham, certain salad dressings, etc.  She voices understanding and offers \"I could always pack something myself\". Praise given for willingness to adjust lifestyle. We reviewed contact numbers and I urged the EARLY report of concerns to most easily address issues. We discussed the WC as option for additional education, dietary support, and IV Lasix if indicated. Pt declines WC referral at this time - \"I just want to get out of here and stay away from here\". She  Is understanding it is still an option as OP. We discussed previous noncompliance with diuretic. Pt reports she has urgency issues and \"hates going to bathroom all the time'. We discussed option of taking diuretic at time more convenient to her but importance of not skipping doses. We discussed possible need to discuss with urologist as she has history of bladder repair with mesh. She states she has been reluctant \"because they look at you like you want money like they advertise on TV how you can niyah the mesh companies\". We discussed option of female urinal and potty chair in convenient location in her home if necessary. We reviewed the requirement of 7 day followup.  I informed her I would be waiting on

## 2020-02-27 NOTE — PROGRESS NOTES
Assessment complete; Patient resting comfortably in bed and denies any needs at this time. Will continue to monitor patient.

## 2020-02-27 NOTE — FLOWSHEET NOTE
02/26/20 1820   Urine Assessment   Bladder Scan Volume (mL) 28 mL   $ Bladder scan $ Yes     Post void residual bladder scan completed. Bladder scan volume of 28mL post void.   Electronically signed by Miriam Abreu RN on 2/26/2020 at 7:54 PM

## 2020-02-27 NOTE — CONSULTS
[address] Andi Hoffman [address]                                  CONSULTATION    PATIENT NAME: Lakesha Fink                     :        1945  MED REC NO:   [id number]                          ROOM:       5121  ACCOUNT NO:   [de-identified]                           ADMIT DATE: 2020  PROVIDER:     Darren Mike MD    CONSULT DATE:  2020    REASON FOR ADMISSION/CHIEF COMPLAINT:  Shortness of breath occurring in  the setting of hypertensive urgency with a blood pressure of 202/113. REASON FOR CONSULTATION:  Chronic kidney disease stage III with a  baseline serum creatinine of 1.3 to 1.6 going as far back as 2016. The patient had an appointment with Dr. Jesusita Bray today  which she _____ she was hospitalized due to her shortness. Her  shortness of breath has dramatically improved. HISTORY OF PRESENT ILLNESS:  The patient is a 67-year-old morbidly obese  female with a past medical history significant for atrial fibrillation,  chronic diastolic CHF with preserved ejection fraction, CKD stage III,  who presented to the emergency department with a chief complaint of  shortness of breath. Chest x-ray revealed evidence of pulmonary  congestion. She was started on Lasix. She was also noted to have a  systolic blood pressure above 200. She has been given lisinopril,  metoprolol, and Imdur. Her blood pressure is gradually coming down. Her most recent systolic blood pressure is 168/71. At the time of my  evaluation, the patient was sitting in the recliner. She was able to  complete full sentences. Her shortness of breath has completely  resolved. She does not appear to be volume overloaded. Creatinine  bumped up to 1.6 today. Cardiology has been consulted to evaluate her  diastolic heart failure as well as her atrial fibrillation. She is  currently in normal sinus rhythm.   Nephrology was consulted for  evaluation and management of the patient's chronically elevated serum  creatinine. REVIEW OF SYSTEMS:  Positive for shortness of breath which has almost  completely resolved. She does not appear volume overloaded. There is  no edema in the lower extremities. The patient denies dizziness,  syncope, dysarthria, dysphagia, ear aches, sore throat, nasal discharge. She did have some wheezing which has resolved. She has had atrial  fibrillation and irregular heartbeat that is also now in sinus rhythm. She denies hematochezia, melena, hematuria, frequency, dysuria. All  other review of systems is negative. PAST MEDICAL HISTORY:  1.  Morbid obesity. 2.  Diastolic heart failure. 3.  Atrial fibrillation. 4.  DVT. 5.  Hypertension. 6.  Hyperlipidemia. 7.  COPD. 8.  Abdominal aortic aneurysm diagnosed in 2015. PAST SURGICAL HISTORY:  1. Tonsillectomy. 2.  Right knee replacement in 2013. 3.  Hysterectomy. 4.  Cholecystectomy. 5.  Cataract removal.  6.  Bladder suspension surgery with mesh. 7.  Appendectomy. 8.  Endovascular AAA repair. FAMILY HISTORY:  No family history of kidney disease. SOCIAL HISTORY:  The patient is a former smoker. ALLERGIES:  MORPHINE. CURRENT MEDICATIONS:  Include  1. Lisinopril. 2.  Lasix. 3.  Albuterol. 4.  Amiodarone. 5.  _____. 6.  Pulmicort. 7.  Symbicort. 8.  Lasix. 9.  Imdur. 10.  Solu-Medrol. 11.  Toprol. 12.  Xarelto. 13.  Crestor. PHYSICAL EXAMINATION:  VITAL SIGNS:  Blood pressure is 168/71 with a pulse of 59, temperature  is 98.2, oxygen saturation is 95% on room air. GENERAL:  In no apparent distress, conversant, clinically appears to be  euvolemic. HEENT:  Ears and nose appear externally without deformity. Mucous  membranes are moist.  Normocephalic, atraumatic. Eyes:  Anicteric  sclerae. Moist conjunctivae. No lid lag. Pupils are equal and  reactive to light. NECK:  Free range of motion. Supple.   No thyromegaly. CHEST:  Clear to auscultation bilaterally with no intercostal  retractions but decreased breath sounds bilaterally. CARDIOVASCULAR:  Regular rate and rhythm. No rubs. ABDOMEN:  Soft, nontender, bowel sounds present. No organomegaly. EXTREMITIES:  Lower extremities:  No lower extremity edema. No  extremity lymphadenopathy. NEUROLOGICAL EXAMINATION:  No asterixis. No focal motor neurological  deficits. PSYCHIATRIC:  Appropriate affect. Alert and oriented to time, place,  and person. SKIN:  Loss of turgor. No rash. LABORATORY DATA:  Sodium 136, potassium 4.4, chloride 94, bicarb 27, BUN  35, creatinine 1.6. IMPRESSION:  1.  CKD stage III. Creatinine is at baseline. I am concerned that we  may dry her out too much by giving her more Lasix, so I have hold her  Lasix. She really does not appear to be volume overloaded and her  shortness of breath is occurring in the setting of hypertensive urgency  and it seems to be going away and improving just with adequate control  of blood pressure so I am going to focus on getting her blood pressure  under control first.  I am adding a calcium channel blocker, amlodipine  and hydralazine which should work synergistically very well with Imdur  and I have held the lisinopril to avoid any further increase in serum  creatinine. We may end up starting her on a diuretic if the CT scan I  ordered without contrast revealed evidence of pulmonary congestion but I  would just start her on oral torsemide and monitor kidney function  closely. 2.  Atrial fibrillation, back in normal sinus rhythm. Management per  Cardiology. She is currently on amiodarone as well as Xarelto. 3.  Hypertension. Blood pressure is coming down slowly at the desired  rate. I would avoid hypotension or lowering blood pressure any faster. 4.  Morbid obesity. RECOMMENDATIONS:  1. Added hydralazine as well as amlodipine. 2.  Stop the Lasix. 3.  Stop lisinopril.   4.  Order CT

## 2020-02-27 NOTE — PROGRESS NOTES
pulmonary congestion, I am suspicious of diastolic heart failure for her symptoms. Known COPD, COPD exacerbation can also cause similar symptoms  -Atrial fibrillation on admission may have also contributed to her symptomatology  -Since she is having worsening renal insufficiency has no obvious evidence of pulmonary congestion on the chest x-ray or CAT scan, I will proceed with a right heart study to accurately assess her fluid status will help manage her fluid status more accurately      PAF  -Patient has converted to sinus rhythm. I will quickly taper her amiodarone dose. We will continue her on anticoagulation therapy    HTN  -Heart pressure is significantly elevated today  -I will space her blood pressure medicines.   And increase her hydralazine to 50 mg 3 times a day        MARK on CKD      :      Electronically signed by Garland Claros MD on 2/27/2020 at 9:12 AM

## 2020-02-28 ENCOUNTER — APPOINTMENT (OUTPATIENT)
Dept: CARDIAC CATH/INVASIVE PROCEDURES | Age: 75
DRG: 286 | End: 2020-02-28
Payer: MEDICARE

## 2020-02-28 LAB
ALBUMIN SERPL-MCNC: 3.9 G/DL (ref 3.4–5)
ANION GAP SERPL CALCULATED.3IONS-SCNC: 13 MMOL/L (ref 3–16)
BUN BLDV-MCNC: 46 MG/DL (ref 7–20)
CALCIUM SERPL-MCNC: 9.1 MG/DL (ref 8.3–10.6)
CHLORIDE BLD-SCNC: 98 MMOL/L (ref 99–110)
CO2: 28 MMOL/L (ref 21–32)
CREAT SERPL-MCNC: 1.7 MG/DL (ref 0.6–1.2)
GFR AFRICAN AMERICAN: 36
GFR NON-AFRICAN AMERICAN: 29
GLUCOSE BLD-MCNC: 99 MG/DL (ref 70–99)
HCT VFR BLD CALC: 36.7 % (ref 36–48)
HEMOGLOBIN: 12.1 G/DL (ref 12–16)
MAGNESIUM: 2.4 MG/DL (ref 1.8–2.4)
MCH RBC QN AUTO: 29.1 PG (ref 26–34)
MCHC RBC AUTO-ENTMCNC: 33.1 G/DL (ref 31–36)
MCV RBC AUTO: 87.9 FL (ref 80–100)
PDW BLD-RTO: 15.4 % (ref 12.4–15.4)
PHOSPHORUS: 4 MG/DL (ref 2.5–4.9)
PLATELET # BLD: 212 K/UL (ref 135–450)
PMV BLD AUTO: 9.7 FL (ref 5–10.5)
POTASSIUM SERPL-SCNC: 4.4 MMOL/L (ref 3.5–5.1)
RBC # BLD: 4.17 M/UL (ref 4–5.2)
SODIUM BLD-SCNC: 139 MMOL/L (ref 136–145)
URIC ACID, SERUM: 9.3 MG/DL (ref 2.6–6)
WBC # BLD: 15.9 K/UL (ref 4–11)

## 2020-02-28 PROCEDURE — 99153 MOD SED SAME PHYS/QHP EA: CPT

## 2020-02-28 PROCEDURE — C1751 CATH, INF, PER/CENT/MIDLINE: HCPCS

## 2020-02-28 PROCEDURE — 6370000000 HC RX 637 (ALT 250 FOR IP): Performed by: INTERNAL MEDICINE

## 2020-02-28 PROCEDURE — 84550 ASSAY OF BLOOD/URIC ACID: CPT

## 2020-02-28 PROCEDURE — 2580000003 HC RX 258

## 2020-02-28 PROCEDURE — C1769 GUIDE WIRE: HCPCS

## 2020-02-28 PROCEDURE — 93451 RIGHT HEART CATH: CPT

## 2020-02-28 PROCEDURE — 2500000003 HC RX 250 WO HCPCS

## 2020-02-28 PROCEDURE — 80069 RENAL FUNCTION PANEL: CPT

## 2020-02-28 PROCEDURE — 99152 MOD SED SAME PHYS/QHP 5/>YRS: CPT

## 2020-02-28 PROCEDURE — 2060000000 HC ICU INTERMEDIATE R&B

## 2020-02-28 PROCEDURE — 99232 SBSQ HOSP IP/OBS MODERATE 35: CPT | Performed by: INTERNAL MEDICINE

## 2020-02-28 PROCEDURE — 83735 ASSAY OF MAGNESIUM: CPT

## 2020-02-28 PROCEDURE — 85027 COMPLETE CBC AUTOMATED: CPT

## 2020-02-28 PROCEDURE — 94640 AIRWAY INHALATION TREATMENT: CPT

## 2020-02-28 PROCEDURE — 4A023N6 MEASUREMENT OF CARDIAC SAMPLING AND PRESSURE, RIGHT HEART, PERCUTANEOUS APPROACH: ICD-10-PCS | Performed by: INTERNAL MEDICINE

## 2020-02-28 PROCEDURE — 2709999900 HC NON-CHARGEABLE SUPPLY

## 2020-02-28 PROCEDURE — 6360000002 HC RX W HCPCS: Performed by: INTERNAL MEDICINE

## 2020-02-28 PROCEDURE — C1894 INTRO/SHEATH, NON-LASER: HCPCS

## 2020-02-28 PROCEDURE — 6360000002 HC RX W HCPCS

## 2020-02-28 PROCEDURE — 36415 COLL VENOUS BLD VENIPUNCTURE: CPT

## 2020-02-28 PROCEDURE — 2580000003 HC RX 258: Performed by: INTERNAL MEDICINE

## 2020-02-28 RX ORDER — SODIUM CHLORIDE 0.9 % (FLUSH) 0.9 %
10 SYRINGE (ML) INJECTION EVERY 12 HOURS SCHEDULED
Status: DISCONTINUED | OUTPATIENT
Start: 2020-02-28 | End: 2020-02-28 | Stop reason: SDUPTHER

## 2020-02-28 RX ORDER — ACETAMINOPHEN 325 MG/1
650 TABLET ORAL EVERY 4 HOURS PRN
Status: DISCONTINUED | OUTPATIENT
Start: 2020-02-28 | End: 2020-02-29 | Stop reason: HOSPADM

## 2020-02-28 RX ORDER — SODIUM CHLORIDE 0.9 % (FLUSH) 0.9 %
10 SYRINGE (ML) INJECTION PRN
Status: DISCONTINUED | OUTPATIENT
Start: 2020-02-28 | End: 2020-02-29 | Stop reason: HOSPADM

## 2020-02-28 RX ORDER — ONDANSETRON 2 MG/ML
4 INJECTION INTRAMUSCULAR; INTRAVENOUS EVERY 6 HOURS PRN
Status: DISCONTINUED | OUTPATIENT
Start: 2020-02-28 | End: 2020-02-29 | Stop reason: HOSPADM

## 2020-02-28 RX ORDER — TORSEMIDE 20 MG/1
20 TABLET ORAL DAILY
Status: DISCONTINUED | OUTPATIENT
Start: 2020-02-28 | End: 2020-02-29 | Stop reason: HOSPADM

## 2020-02-28 RX ADMIN — AMIODARONE HYDROCHLORIDE 200 MG: 200 TABLET ORAL at 21:33

## 2020-02-28 RX ADMIN — CLONAZEPAM 0.5 MG: 0.5 TABLET ORAL at 23:02

## 2020-02-28 RX ADMIN — BUDESONIDE 500 MCG: 0.5 SUSPENSION RESPIRATORY (INHALATION) at 21:12

## 2020-02-28 RX ADMIN — ISOSORBIDE MONONITRATE 30 MG: 30 TABLET, EXTENDED RELEASE ORAL at 10:18

## 2020-02-28 RX ADMIN — SODIUM CHLORIDE, PRESERVATIVE FREE 10 ML: 5 INJECTION INTRAVENOUS at 10:19

## 2020-02-28 RX ADMIN — ALBUTEROL SULFATE 2.5 MG: 2.5 SOLUTION RESPIRATORY (INHALATION) at 12:29

## 2020-02-28 RX ADMIN — PREDNISONE 40 MG: 20 TABLET ORAL at 10:17

## 2020-02-28 RX ADMIN — TORSEMIDE 20 MG: 20 TABLET ORAL at 10:17

## 2020-02-28 RX ADMIN — HYDRALAZINE HYDROCHLORIDE 50 MG: 50 TABLET, FILM COATED ORAL at 06:16

## 2020-02-28 RX ADMIN — RIVAROXABAN 20 MG: 20 TABLET, FILM COATED ORAL at 11:36

## 2020-02-28 RX ADMIN — ALBUTEROL SULFATE 2.5 MG: 2.5 SOLUTION RESPIRATORY (INHALATION) at 21:11

## 2020-02-28 RX ADMIN — HYDRALAZINE HYDROCHLORIDE 50 MG: 50 TABLET, FILM COATED ORAL at 21:33

## 2020-02-28 RX ADMIN — BUDESONIDE AND FORMOTEROL FUMARATE DIHYDRATE 2 PUFF: 160; 4.5 AEROSOL RESPIRATORY (INHALATION) at 21:12

## 2020-02-28 RX ADMIN — HYDRALAZINE HYDROCHLORIDE 50 MG: 50 TABLET, FILM COATED ORAL at 14:19

## 2020-02-28 RX ADMIN — METOPROLOL SUCCINATE 50 MG: 50 TABLET, EXTENDED RELEASE ORAL at 10:18

## 2020-02-28 RX ADMIN — ALBUTEROL SULFATE 2.5 MG: 2.5 SOLUTION RESPIRATORY (INHALATION) at 16:29

## 2020-02-28 RX ADMIN — NIFEDIPINE 30 MG: 30 TABLET, FILM COATED, EXTENDED RELEASE ORAL at 11:36

## 2020-02-28 RX ADMIN — SODIUM CHLORIDE, PRESERVATIVE FREE 10 ML: 5 INJECTION INTRAVENOUS at 21:34

## 2020-02-28 RX ADMIN — AMIODARONE HYDROCHLORIDE 200 MG: 200 TABLET ORAL at 10:17

## 2020-02-28 RX ADMIN — ARFORMOTEROL TARTRATE 15 MCG: 15 SOLUTION RESPIRATORY (INHALATION) at 21:11

## 2020-02-28 ASSESSMENT — PAIN SCALES - GENERAL
PAINLEVEL_OUTOF10: 0

## 2020-02-28 NOTE — PROGRESS NOTES
Pt is sitting in chair, A and Ox4. Pt has no complaints of pain at this time. Pt complains of feeling \"itchy\" on her upper arms.

## 2020-02-28 NOTE — PROCEDURES
[address] [place] [address]                            CARDIAC CATHETERIZATION    PATIENT NAME: Veronika Huddleston                     :        1945  MED REC NO:   [id number]                          ROOM:       5121  ACCOUNT NO:   [de-identified]                           ADMIT DATE: 2020  PROVIDER:     Ernesto Gil MD    DATE OF PROCEDURE:  2020    PROCEDURE PERFORMED:  Right heart study. PROCEDURE NOTE:  The patient was explained benefits and risks of the  procedure. Informed consent was obtained. The patient's existing  antecubital vein was accessed, and with the help of a micropuncture kit,  the vein was exchanged to a 5-Australian system. A 5-Australian [place]-Doris  catheter was advanced with the help of a long 0.014 wire. Pressure and  oxygen saturations were obtained in the right atrium, right ventricle,  right pulmonary artery positions. Pulmonary capillary wedge was  subsequently obtained. Cardiac output and cardiac indices were  obtained. All the catheters were then removed and the patient left the  cath lab in stable condition. HEMODYNAMIC DATA:  Right atrial mean pressure was 3 with oxygen  saturation of 69%. RV pressure was 33/2 with mean of 4 and oxygen  saturation of 72%. PA pressure was 43/14 with a mean of 24, oxygen  saturation was 68%. Pulmonary capillary wedge pressure mean was 29  mmHg. Cardiac output by Blanca method was 6.13 liters per minute. Cardiac index  was 2.99 liters per minute per body surface area. Thermodilution  cardiac output was 4.88 liters per minute and cardiac index was 2.38  liters per minute per body surface area. OVERALL IMPRESSION:  1.  _____ normal right cardiac pressure. 2.  Elevated pulmonary capillary wedge pressure with a mean pulmonary  capillary wedge of 29 mmHg. 3.  Normal cardiac output and indices.   4.  No oxygen step off to suggest ASD/PFO. Estimated blood loss-less than 50 cc    In view of the above findings, we will start the patient on a small dose  of diuretic therapy and see whether we need to adjust some of her  antihypertensive medicines or not. Her findings are consistent with a  diastolic heart failure.       Gt Anderson MD    D: 02/28/2020 8:42:25       T: 02/28/2020 12:27:00     [id number]  Job#: [id number]     Doc#: [id number]    CC:  MD Gloria Henderson MD

## 2020-02-28 NOTE — BRIEF OP NOTE
Brief Postoperative Note    Aaron Mccracken  YOB: 1945  [id number]    Pre-operative Diagnosis: CHF    Post-operative Diagnosis: Same    Procedure: RHC    Anesthesia: Moderate Sedation    Surgeons/Assistants: Lyudmila English    Estimated Blood Loss: less than 50     Complications: None    Specimens: Was Not Obtained    Findings: Mildly elevated Rt heart pressures                 Elevated PCW                 No CO/CI                 No O2 step up to suggest ASD/PFO.     Full report to follow    Electronically signed by Gilles Ordonez MD on 2/28/2020 at 8:26 AM

## 2020-02-28 NOTE — PLAN OF CARE
Diagnotic cardiac cath completed via right brachial puncture with no interventions. HTN resolved with medications given as ordered. Slight improvement noted with renal function as evidenced by creat 1.7 and GFR 29.  Pt denies pain/discomfort this shift.

## 2020-02-28 NOTE — PRE SEDATION
Brief Pre-Op Note/Sedation Assessment      Zain Dodge  1945  [id number]      [id number]  7:55 AM    Planned Procedure: Cardiac Catheterization Procedure    Post Procedure Plan: Return to same level of care    Consent: I have discussed with the patient and/or the patient representative the indication, alternatives, and the possible risks and/or complications of the planned procedure and the anesthesia methods. The patient and/or patient representative appear to understand and agree to proceed. Chief Complaint: Dyspnea      Indications for Cath Procedure:  LV Dysfunction  Anginal Classification within 2 weeks:  No symptoms  NYHA Heart Failure Class within 2 weeks: Class III - Symptoms of HF on less-than-ordinary exertion, Newly Diagnosed? Yes, Heart Failure Type: Diastolic  Is Cath Lab Visit Valve-related?: No  Surgical Risk: Intermediate  Functional Type: Unknown    Anti- Anginal Meds within 2 weeks:   Yes: Beta Blockers    Stress or Imaging Studies Performed:  None     Vital Signs:  BP (!) 178/85   Pulse 56   Temp 97.5 °F (36.4 °C) (Oral)   Resp 15   Ht 5' 4\" (1.626 m)   Wt 225 lb 1.4 oz (102.1 kg)   SpO2 96%   BMI 38.64 kg/m²     Allergies:   Allergies   Allergen Reactions    Morphine Rash     rash    Other Rash       Past Medical History:  Past Medical History:   Diagnosis Date    AAA [facility]abdominal aortic aneurysm) ([facility] [address]     pt states it is 4cm    AAA (abdominal aortic aneurysm) without rupture (HCC) 2/10/2015    Atrial fibrillation (HCC)     CAD (coronary artery disease)     CHF (congestive heart failure) (HCC)     COPD (chronic obstructive pulmonary disease) (HCC)     History of blood clots     Hyperlipidemia     Hypertension          Surgical History:  Past Surgical History:   Procedure Laterality Date    ABDOMINAL AORTIC ANEURYSM REPAIR      Endovascular abdominal AA    APPENDECTOMY  1990    incidental    BLADDER SUSPENSION      CATARACT REMOVAL      CHOLECYSTECTOMY

## 2020-02-28 NOTE — FLOWSHEET NOTE
LW and HCPOA completed with both pt and . Original and copy(ies) to patient and . Copy of pts  placed on soft chart. 20 1248   Encounter Summary   Services provided to: Patient and family together   Referral/Consult From: Nurse   Continue Visiting   ([employer] and HCPOA completed. ( also did) [address] )   Complexity of Encounter Moderate   Length of Encounter 1 hour;30 minutes   Advance Care Planning Yes   Spiritual/Zoroastrianism   Type Spiritual support   Assessment Calm; Approachable   Intervention Active listening;Sustaining presence/ Ministry of presence   Outcome Expressed gratitude   Advance Directives (For Healthcare)   Healthcare Directive Yes, patient has an advance directive for healthcare treatment   Type of Healthcare Directive Durable power of  for health care;Living will   Copy in Chart Yes, copy in chart   Chart Copy Status : Active;Current   Date Reviewed and Current: 20   [address] Agent's Name Gabriella Blas ()   Healthcare Agent's Phone Number [phone]   If you are unable to speak for yourself, does your Healthcare Agent or Legal Spokesperson know your healthcare wishes?  Yes   [address]  Electronically signed by Buelah Apgar on 2020 at 12:51 PM

## 2020-02-28 NOTE — PROGRESS NOTES
Hospitalist Progress Note      PCP: Carlos Cash MD    Date of Admission: 2/25/2020        Subjective:     Feels much better. Shortness of breath is improved. Is able to ambulate. Just had right heart cath done this morning    Medications:  Reviewed    Infusion Medications   Scheduled Medications    torsemide  20 mg Oral Daily    hydrALAZINE  50 mg Oral 3 times per day    NIFEdipine  30 mg Oral Daily    amiodarone  200 mg Oral BID    Followed by   Jose Diggs ON 3/4/2020] amiodarone  200 mg Oral Daily    predniSONE  40 mg Oral Daily    albuterol  2.5 mg Nebulization 4x daily    budesonide  500 mcg Nebulization BID    isosorbide mononitrate  30 mg Oral Daily    metoprolol succinate  50 mg Oral Daily    rivaroxaban  20 mg Oral Daily with breakfast    rosuvastatin  40 mg Oral Daily    budesonide-formoterol  2 puff Inhalation BID    sodium chloride flush  10 mL Intravenous 2 times per day    Arformoterol Tartrate  15 mcg Nebulization BID     PRN Meds: sodium chloride flush, acetaminophen, magnesium hydroxide, ondansetron, hydrALAZINE, albuterol, albuterol sulfate HFA, clonazePAM, acetaminophen, polyethylene glycol, promethazine      Intake/Output Summary (Last 24 hours) at 2/28/2020 1540  Last data filed at 2/28/2020 0549  Gross per 24 hour   Intake 0 ml   Output 100 ml   Net -100 ml       Exam:    BP (!) 149/65   Pulse 71   Temp 97.5 °F (36.4 °C) (Oral)   Resp 18   Ht 5' 4\" (1.626 m)   Wt 225 lb 1.4 oz (102.1 kg)   SpO2 95%   BMI 38.64 kg/m²     General appearance: No apparent distress, appears stated age and cooperative. HEENT: Pupils equal, round, and reactive to light. Conjunctivae/corneas clear. Neck: Supple, with full range of motion. No jugular venous distention. Trachea midline. Respiratory:  Normal respiratory effort. Clear to auscultation, bilaterally without Rales/Wheezes/Rhonchi.   Cardiovascular: Regular rate and rhythm with normal S1/S2 without murmurs, rubs or gallops. Abdomen: Soft, non-tender, non-distended with normal bowel sounds. Musculoskeletal: No clubbing, cyanosis or edema bilaterally. Full range of motion without deformity. Skin: Skin color, texture, turgor normal.  No rashes or lesions. Neurologic:  Neurovascularly intact without any focal sensory/motor deficits. Cranial nerves: II-XII intact, grossly non-focal.  Psychiatric: Alert and oriented, thought content appropriate, normal insight  Capillary Refill: Brisk,< 3 seconds   Peripheral Pulses: +2 palpable, equal bilaterally       Labs:   Recent Labs     02/26/20  0505 02/27/20 0447 02/28/20 0449   WBC 11.7* 13.0* 15.9*   HGB 11.4* 11.4* 12.1   HCT 35.1* 34.7* 36.7    194 212     Recent Labs     02/26/20  0505 02/27/20  0447 02/28/20 0449    138 139   K 4.4 4.4 4.4   CL 94* 97* 98*   CO2 27 25 28   BUN 35* 51* 46*   CREATININE 1.6* 1.8* 1.7*   CALCIUM 9.1 8.6 9.1   PHOS 5.0* 5.1* 4.0     Recent Labs     02/26/20  0505   AST 16   ALT 27   BILITOT 0.4   ALKPHOS 66     No results for input(s): INR in the last 72 hours. No results for input(s): Mitch Hippo in the last 72 hours. Assessment/Plan:      -SOB dt decompensated diastolic CHF and presumed COPD exacerbation--improved  -Acute on chronic diastolic heart failure associated with hypertensive urgency-patient is status post right heart cath 2/28/19, showed elevated pulmonary capillary wedge pressure with normal right cardiac pressures, normal cardiac output and indices  -COPD with presumed acute exacerbation-much improved  -Acute respiratory failure with hypoxia-resolved-  -Hypertensive urgency. . BPs improving-on hydralazine, isosorbide mononitrate, metoprolol, nifedipine  -MARK on CKD stage III--creatinine has now plateaued  -Obstructive sleep apnea-noncompliant with CPAP-no longer has the machine and will not wear it  -COPD-compensated  -Chronic atrial fibrillation-rate controlled-on Xarelto, metoprolol, amiodarone  -Obesity with a BMI of 38.5  -Hyperlipidemia-on statin  -History of DVT and pulmonary embolism-on Xarelto  -Coronary artery disease-has occluded RCA-no prior stents--stable--on statin,imdur and beta-blocker  -History of AAA status post endovascular repair  -Leukocytosis due to steroids    Plan    -Continue current treatment. . On nifedipine, hydralazine, metoprolol, aspirin and isosorbide  monitrate for blood pressure control. . Torsemide added today by cardiology. . Closely monitor blood pressure  -Given right heart cath and diuretics started today, will observe overnight and discharge in a.m. if renal function remains stable  -Do not resume ACE on discharge due to MARK        DVT Prophylaxis: Xarelto  Diet: DIET CARDIAC; Low Sodium (2 GM);  Daily Fluid Restriction: 1500 ml  Code Status: Full Code      Lee Rodrigues MD

## 2020-02-28 NOTE — CARE COORDINATION
DISCHARGE PLAN: Pt plans to return home upon d/c.  Pt denied any needs and stated that her spouse will transport her home.   ___________________________________  Met w/pt to address barriers to dc. HOME: Pt resides in a mobile home with her spouse. There are 0 DEIDRE.    DME/O2: Pt reported that she has a walker, cane, shower chair, and grab bars in the shower at home. ACTIVE SERVICES: Pt reported that she does not have any active services at home. Pt stated that she was independent with all self care PTA. TRANSPORTATION: Pt reported that she relies on her spouse for transportation. Pt stated that her spouse will transport pt home. PHARMACY: denies difficulty obtaining/taking meds. Pt stated that she has her medications filled at [facility] in [facility]. PCP: Christiana Pineda    DEMOGRAPHICS: verified address/phone number as correct    INSURANCE:  Medicare    HD/PD: No    THERAPY RECS  Not ordered    Discharge planning team will remain available for needs. Please consult for any specifics not addressed in this note.     [facility]  [phone]  Electronically signed by Raquel Loredo on 2/28/2020 at 4:28 PM

## 2020-02-29 VITALS
WEIGHT: 224.43 LBS | HEIGHT: 64 IN | BODY MASS INDEX: 38.32 KG/M2 | TEMPERATURE: 97.6 F | RESPIRATION RATE: 20 BRPM | DIASTOLIC BLOOD PRESSURE: 90 MMHG | OXYGEN SATURATION: 98 % | SYSTOLIC BLOOD PRESSURE: 168 MMHG | HEART RATE: 60 BPM

## 2020-02-29 LAB
ALBUMIN SERPL-MCNC: 3.5 G/DL (ref 3.4–5)
ANION GAP SERPL CALCULATED.3IONS-SCNC: 14 MMOL/L (ref 3–16)
BUN BLDV-MCNC: 47 MG/DL (ref 7–20)
CALCIUM SERPL-MCNC: 8.6 MG/DL (ref 8.3–10.6)
CHLORIDE BLD-SCNC: 98 MMOL/L (ref 99–110)
CO2: 24 MMOL/L (ref 21–32)
CREAT SERPL-MCNC: 1.6 MG/DL (ref 0.6–1.2)
GFR AFRICAN AMERICAN: 38
GFR NON-AFRICAN AMERICAN: 32
GLUCOSE BLD-MCNC: 96 MG/DL (ref 70–99)
PHOSPHORUS: 3.7 MG/DL (ref 2.5–4.9)
POTASSIUM SERPL-SCNC: 4.1 MMOL/L (ref 3.5–5.1)
SODIUM BLD-SCNC: 136 MMOL/L (ref 136–145)

## 2020-02-29 PROCEDURE — 80069 RENAL FUNCTION PANEL: CPT

## 2020-02-29 PROCEDURE — 36415 COLL VENOUS BLD VENIPUNCTURE: CPT

## 2020-02-29 PROCEDURE — 6370000000 HC RX 637 (ALT 250 FOR IP): Performed by: INTERNAL MEDICINE

## 2020-02-29 PROCEDURE — 6360000002 HC RX W HCPCS: Performed by: INTERNAL MEDICINE

## 2020-02-29 PROCEDURE — 94640 AIRWAY INHALATION TREATMENT: CPT

## 2020-02-29 PROCEDURE — 2580000003 HC RX 258: Performed by: INTERNAL MEDICINE

## 2020-02-29 PROCEDURE — 94761 N-INVAS EAR/PLS OXIMETRY MLT: CPT

## 2020-02-29 RX ORDER — AMIODARONE HYDROCHLORIDE 200 MG/1
200 TABLET ORAL DAILY
Qty: 30 TABLET | Refills: 3 | Status: SHIPPED | OUTPATIENT
Start: 2020-03-04 | End: 2020-09-14 | Stop reason: ALTCHOICE

## 2020-02-29 RX ORDER — FLUCONAZOLE 150 MG/1
150 TABLET ORAL DAILY
Qty: 3 TABLET | Refills: 0 | Status: SHIPPED | OUTPATIENT
Start: 2020-02-29 | End: 2020-03-03

## 2020-02-29 RX ORDER — ARFORMOTEROL TARTRATE 15 UG/2ML
15 SOLUTION RESPIRATORY (INHALATION) 2 TIMES DAILY
Qty: 120 ML | Refills: 3 | Status: ON HOLD | OUTPATIENT
Start: 2020-02-29 | End: 2021-07-05

## 2020-02-29 RX ORDER — NIFEDIPINE 30 MG/1
30 TABLET, FILM COATED, EXTENDED RELEASE ORAL DAILY
Qty: 30 TABLET | Refills: 3 | Status: ON HOLD | OUTPATIENT
Start: 2020-02-29 | End: 2020-09-25 | Stop reason: SDUPTHER

## 2020-02-29 RX ORDER — TORSEMIDE 20 MG/1
20 TABLET ORAL DAILY
Qty: 30 TABLET | Refills: 3 | Status: SHIPPED | OUTPATIENT
Start: 2020-03-01 | End: 2020-10-16 | Stop reason: SDUPTHER

## 2020-02-29 RX ORDER — HYDRALAZINE HYDROCHLORIDE 50 MG/1
50 TABLET, FILM COATED ORAL EVERY 8 HOURS SCHEDULED
Qty: 90 TABLET | Refills: 3 | Status: SHIPPED | OUTPATIENT
Start: 2020-02-29 | End: 2021-09-09

## 2020-02-29 RX ADMIN — ALBUTEROL SULFATE 2.5 MG: 2.5 SOLUTION RESPIRATORY (INHALATION) at 08:22

## 2020-02-29 RX ADMIN — METOPROLOL SUCCINATE 50 MG: 50 TABLET, EXTENDED RELEASE ORAL at 09:42

## 2020-02-29 RX ADMIN — RIVAROXABAN 20 MG: 20 TABLET, FILM COATED ORAL at 09:42

## 2020-02-29 RX ADMIN — NIFEDIPINE 30 MG: 30 TABLET, FILM COATED, EXTENDED RELEASE ORAL at 12:01

## 2020-02-29 RX ADMIN — BUDESONIDE 500 MCG: 0.5 SUSPENSION RESPIRATORY (INHALATION) at 08:30

## 2020-02-29 RX ADMIN — TORSEMIDE 20 MG: 20 TABLET ORAL at 09:41

## 2020-02-29 RX ADMIN — AMIODARONE HYDROCHLORIDE 200 MG: 200 TABLET ORAL at 09:42

## 2020-02-29 RX ADMIN — HYDRALAZINE HYDROCHLORIDE 50 MG: 50 TABLET, FILM COATED ORAL at 05:47

## 2020-02-29 RX ADMIN — ARFORMOTEROL TARTRATE 15 MCG: 15 SOLUTION RESPIRATORY (INHALATION) at 08:35

## 2020-02-29 RX ADMIN — BUDESONIDE AND FORMOTEROL FUMARATE DIHYDRATE 2 PUFF: 160; 4.5 AEROSOL RESPIRATORY (INHALATION) at 08:40

## 2020-02-29 RX ADMIN — ISOSORBIDE MONONITRATE 30 MG: 30 TABLET, EXTENDED RELEASE ORAL at 09:41

## 2020-02-29 RX ADMIN — SODIUM CHLORIDE, PRESERVATIVE FREE 10 ML: 5 INJECTION INTRAVENOUS at 09:43

## 2020-02-29 RX ADMIN — PREDNISONE 40 MG: 20 TABLET ORAL at 09:42

## 2020-02-29 ASSESSMENT — PAIN SCALES - GENERAL
PAINLEVEL_OUTOF10: 0
PAINLEVEL_OUTOF10: 0

## 2020-02-29 NOTE — CONSULTS
Nutrition Note:    The patient will still be monitored per nutrition standards of care. Consult dietitian if additional nutrition intervention is needed. Neither the total Braydon score nor the nutrition subscale score have been independently validated for use as a tool to predict risk of malnutrition. Many have concluded that the Braydon scale is highly overpredictive of actual pressure injury development. In other words, not all patients who are predicted to develop a pressure ulcer injury actually develop one.  This overprediction could be seen as a major flaw of the braydon scale, weakening its practical utility overall, especially as it relates to nutrition screening and referral to the RDN. ([place]. Academy of Nutrition and Dietetics, 2017)    Aaron Salas, [address] [place] [address]  Office:  [phone]

## 2020-02-29 NOTE — DISCHARGE SUMMARY
Hospital Medicine Discharge Summary    Patient ID: Zeynep Lemons      Patient's PCP: Radha Maravilla MD    Admit Date: 2/25/2020     Discharge Date:   2/29/2020    Admitting Physician: Pearl Wilcox MD     Discharge Physician: Melissa Coates MD     Discharge Diagnoses: Active Hospital Problems    Diagnosis    Coronary artery disease [I25.10]     Priority: High     Class: Chronic    PAF (paroxysmal atrial fibrillation) ([facility]) [I48.0]    AAA (abdominal aortic aneurysm) ([facility]) [I71.4]    Acute on chronic diastolic heart failure ([facility]) [I50.33]    COPD exacerbation ([facility] [address] [J44.1]       The patient was seen and examined on day of discharge and this discharge summary is in conjunction with any daily progress note from day of discharge. Hospital Course: This is a pleasant 77 y.o. female with history of chronic diastolic heart failure, CAD, COPD, history of thrombosis, abdominal aortic aneurysm, atrial fibrillation on chronic anticoagulation with Xarelto, essential hypertension, hyperlipidemia, who presents to the emergency room with complaints of shortness of breath that is mostly worse even with minimal exertion. Symptoms have been ongoing for the past couple of days, so debilitating that she could not walk across the room without getting short of breath. On presentation to the emergency room, chest x-ray was unremarkable; however, proBNP is significantly elevated at 5941 compared to recent values of 2935 about 10 days ago. She does not have cough or fever or chills. She has been admitted for further evaluation. Recent echocardiogram from January 2020 with moderate left ventricular hypertrophy with ejection fraction of 50 to 60%. Continue current treatment. . On nifedipine, hydralazine, metoprolol, aspirin and isosorbide  monitrate for blood pressure control. . Torsemide added today by cardiology. . Closely monitor blood pressure  -Given right heart cath and diuretics started today, will Final Result   Morphologically normal kidneys with no obstruction         CT CHEST WO CONTRAST   Final Result   Clear lungs. No acute abnormality. Mild cardiomegaly. Coronary artery   disease. No CHF, interstitial lung disease, or evident bullous changes. XR CHEST STANDARD (2 VW)   Final Result   No evidence of acute cardiopulmonary disease. Consults:     IP CONSULT TO HOSPITALIST  IP CONSULT TO DIETITIAN  IP CONSULT TO SPIRITUAL SERVICES  IP CONSULT TO CARDIOLOGY  IP CONSULT TO HEART FAILURE NURSE/COORDINATOR  IP CONSULT TO NEPHROLOGY  IP CONSULT TO DIETITIAN  IP CONSULT TO DIETITIAN    Disposition:  home     Condition at Discharge: Stable    Discharge Instructions/Follow-up:  pcp    Code Status:  Full Code     Activity: activity as tolerated    Diet: cardiac diet      Discharge Medications:     Current Discharge Medication List           Details   amiodarone (CORDARONE) 200 MG tablet Take 1 tablet by mouth daily  Qty: 30 tablet, Refills: 3      Arformoterol Tartrate (BROVANA) 15 MCG/2ML NEBU Take 2 mLs by nebulization 2 times daily  Qty: 120 mL, Refills: 3      hydrALAZINE (APRESOLINE) 50 MG tablet Take 1 tablet by mouth every 8 hours  Qty: 90 tablet, Refills: 3      NIFEdipine (ADALAT CC) 30 MG extended release tablet Take 1 tablet by mouth daily  Qty: 30 tablet, Refills: 3      torsemide (DEMADEX) 20 MG tablet Take 1 tablet by mouth daily  Qty: 30 tablet, Refills: 3      fluconazole (DIFLUCAN) 150 MG tablet Take 1 tablet by mouth daily for 3 days  Qty: 3 tablet, Refills: 0              Details   clonazePAM (KLONOPIN) 0.5 MG tablet Take 1 tablet by mouth nightly as needed (sleep) for up to 5 days. Qty: 5 tablet, Refills: 0    Associated Diagnoses: Drug-induced insomnia ([address]      ! ! albuterol (PROVENTIL) (2.5 MG/3ML) 0.083% nebulizer solution Take 3 mLs by nebulization every 4 hours as needed for Wheezing  Qty: 120 mL, Refills: 5    Associated Diagnoses: COPD, severe ([address] predniSONE (DELTASONE) 5 MG tablet Take 1 tablet by mouth daily for 14 days  Qty: 14 tablet, Refills: 0    Associated Diagnoses: COPD, severe (HCC)      rivaroxaban (XARELTO) 20 MG TABS tablet Take 1 tablet by mouth daily (with breakfast)  Qty: 30 tablet, Refills: 3      metoprolol succinate (TOPROL XL) 50 MG extended release tablet TAKE ONE TABLET BY MOUTH DAILY  Qty: 90 tablet, Refills: 3      albuterol sulfate HFA (PROAIR HFA) 108 (90 Base) MCG/ACT inhaler Inhale 2 puffs into the lungs every 4 hours as needed for Wheezing or Shortness of Breath  Qty: 1 Inhaler, Refills: 11      budesonide (PULMICORT) 0.5 MG/2ML nebulizer suspension Take 2 mLs by nebulization 2 times daily  Qty: 360 mL, Refills: 3    Associated Diagnoses: COPD, severe (HCC)      formoterol (PERFOROMIST) 20 MCG/2ML nebulizer solution Take 2 mLs by nebulization 2 times daily  Qty: 120 mL, Refills: 3    Associated Diagnoses: COPD, severe (HCC)      SPIRIVA RESPIMAT 2.5 MCG/ACT AERS inhaler INHALE TWO PUFFS BY MOUTH DAILY  Qty: 1 Inhaler, Refills: 4    Associated Diagnoses: Chronic obstructive pulmonary disease, unspecified COPD type (HCC)      nitroGLYCERIN (NITROSTAT) 0.4 MG SL tablet Place 1 tablet under the tongue every 5 minutes as needed for Chest pain  Qty: 25 tablet, Refills: 1      isosorbide mononitrate (IMDUR) 30 MG extended release tablet TAKE ONE TABLET BY MOUTH DAILY  Qty: 90 tablet, Refills: 3      rosuvastatin (CRESTOR) 40 MG tablet Take 1 tablet by mouth daily  Qty: 90 tablet, Refills: 3      !! albuterol (PROVENTIL) (2.5 MG/3ML) 0.083% nebulizer solution Take 2.5 mg by nebulization 4 times daily      Multiple Vitamins-Minerals (MULTI FOR HER 50+ PO) Take 1 tablet by mouth daily       ! ! - Potential duplicate medications found. Please discuss with provider. Time Spent on discharge is more than 30 minutes in the examination, evaluation, counseling and review of medications and discharge plan. Signed:     Christie Olivarez [facility], MD   2/29/2020      Thank you Lizbeth Vivas MD for the opportunity to be involved in this patient's care. If you have any questions or concerns please feel free to contact me at [phone].

## 2020-02-29 NOTE — PROGRESS NOTES
Discharge instructions explained to pt and  at bedside and copy given. Pt and  verbalize understanding of instrucitons and are in agreement with discharge plan to home.

## 2020-03-02 ENCOUNTER — CARE COORDINATION (OUTPATIENT)
Dept: CASE MANAGEMENT | Age: 75
End: 2020-03-02

## 2020-03-02 PROCEDURE — 1111F DSCHRG MED/CURRENT MED MERGE: CPT

## 2020-03-03 ENCOUNTER — OFFICE VISIT (OUTPATIENT)
Dept: CARDIOLOGY CLINIC | Age: 75
End: 2020-03-03
Payer: MEDICARE

## 2020-03-03 VITALS
DIASTOLIC BLOOD PRESSURE: 72 MMHG | OXYGEN SATURATION: 94 % | WEIGHT: 216 LBS | SYSTOLIC BLOOD PRESSURE: 136 MMHG | HEART RATE: 74 BPM | BODY MASS INDEX: 36.88 KG/M2 | HEIGHT: 64 IN

## 2020-03-03 PROCEDURE — G8417 CALC BMI ABV UP PARAM F/U: HCPCS | Performed by: INTERNAL MEDICINE

## 2020-03-03 PROCEDURE — 1036F TOBACCO NON-USER: CPT | Performed by: INTERNAL MEDICINE

## 2020-03-03 PROCEDURE — 4040F PNEUMOC VAC/ADMIN/RCVD: CPT | Performed by: INTERNAL MEDICINE

## 2020-03-03 PROCEDURE — 1123F ACP DISCUSS/DSCN MKR DOCD: CPT | Performed by: INTERNAL MEDICINE

## 2020-03-03 PROCEDURE — 1090F PRES/ABSN URINE INCON ASSESS: CPT | Performed by: INTERNAL MEDICINE

## 2020-03-03 PROCEDURE — G8399 PT W/DXA RESULTS DOCUMENT: HCPCS | Performed by: INTERNAL MEDICINE

## 2020-03-03 PROCEDURE — G8427 DOCREV CUR MEDS BY ELIG CLIN: HCPCS | Performed by: INTERNAL MEDICINE

## 2020-03-03 PROCEDURE — 99214 OFFICE O/P EST MOD 30 MIN: CPT | Performed by: INTERNAL MEDICINE

## 2020-03-03 PROCEDURE — G8484 FLU IMMUNIZE NO ADMIN: HCPCS | Performed by: INTERNAL MEDICINE

## 2020-03-03 PROCEDURE — 93000 ELECTROCARDIOGRAM COMPLETE: CPT | Performed by: INTERNAL MEDICINE

## 2020-03-03 PROCEDURE — 1111F DSCHRG MED/CURRENT MED MERGE: CPT | Performed by: INTERNAL MEDICINE

## 2020-03-03 PROCEDURE — 3017F COLORECTAL CA SCREEN DOC REV: CPT | Performed by: INTERNAL MEDICINE

## 2020-03-03 NOTE — PROGRESS NOTES
Take 1 tablet by mouth nightly as needed (sleep) for up to 5 days. 5 tablet 0    albuterol (PROVENTIL) (2.5 MG/3ML) 0.083% nebulizer solution Take 3 mLs by nebulization every 4 hours as needed for Wheezing 120 mL 5    rivaroxaban (XARELTO) 20 MG TABS tablet Take 1 tablet by mouth daily (with breakfast) 30 tablet 3    metoprolol succinate (TOPROL XL) 50 MG extended release tablet TAKE ONE TABLET BY MOUTH DAILY 90 tablet 3    albuterol sulfate HFA (PROAIR HFA) 108 (90 Base) MCG/ACT inhaler Inhale 2 puffs into the lungs every 4 hours as needed for Wheezing or Shortness of Breath 1 Inhaler 11    budesonide (PULMICORT) 0.5 MG/2ML nebulizer suspension Take 2 mLs by nebulization 2 times daily 360 mL 3    formoterol (PERFOROMIST) 20 MCG/2ML nebulizer solution Take 2 mLs by nebulization 2 times daily 120 mL 3    SPIRIVA RESPIMAT 2.5 MCG/ACT AERS inhaler INHALE TWO PUFFS BY MOUTH DAILY 1 Inhaler 4    nitroGLYCERIN (NITROSTAT) 0.4 MG SL tablet Place 1 tablet under the tongue every 5 minutes as needed for Chest pain 25 tablet 1    isosorbide mononitrate (IMDUR) 30 MG extended release tablet TAKE ONE TABLET BY MOUTH DAILY 90 tablet 3    rosuvastatin (CRESTOR) 40 MG tablet Take 1 tablet by mouth daily 90 tablet 3    Multiple Vitamins-Minerals (MULTI FOR HER 50+ PO) Take 1 tablet by mouth daily       No current facility-administered medications for this visit. Physical Exam:   /72   Pulse 74   Ht 5' 4\" (1.626 m)   Wt 216 lb (98 kg) Comment: without shoes  SpO2 94%   BMI 37.08 kg/m²   Wt Readings from Last 2 Encounters:   03/03/20 216 lb (98 kg)   02/29/20 224 lb 6.9 oz (101.8 kg)     Physical Exam:   Constitutional: The patient is oriented to person, place, and time. Appears well-developed and well-nourished. In no acute distress. Head: Normocephalic and atraumatic. Pupils equal and round. Neck: Neck supple. No JVP or carotid bruit appreciated. No mass and no thyromegaly present.  No lymphadenopathy concentric left ventricular hypertrophy. Patient appears to be in atrial fibrillation. Ejection fraction is estimated to be 50-60%. Indeterminate diastolic function. Mild aortic regurgitation. Mild tricuspid regurgitation. Mild mitral regurgitation with mitral annular calcification    Cardiac Cath 3/5/18  OVERALL IMPRESSION  1. Again, 100% proximal right coronary artery occlusion with left to right  collaterals. 2.  Mild disease of the distal left main trunk. 3.  20% to 30% ostial left anterior descending artery stenosis with  collaterals from LAD to the right coronary artery. 4.  30% to 40% stenosis of the proximal circumflex artery. 5.  Normal left ventricular systolic function with estimated EF of 55% to  60%. 6.  Slightly enlarged aortic root with no evidence of aortic stenosis or  regurgitation.     In view of the above findings, we will okay the patient for her upcoming  aortic aneurysm surgery from cardiac standpoint. Bud Gonzalez 2/19/18   Summary    Abnormal study. There is a moderate sized, mild intensity, partially    reversible defect of the mid to apical anterior and mid anterolateral walls    which is consistent with ischemia. There is breast attenuation but stress    images appear worse.    Normal LV size and systolic function.    Overall findings represent an intermediate risk study     Echo 1/9/2018:  Mild concentric left ventricular hypertrophy. Visually estimated ejection fraction of 65%. Mitral annular calcification. Mild left atrial dilation. Mild aortic stenosis. (mean gradients 21FCUA)  The systolic pulmonary artery pressure (SPAP) estimated at 30 mmHg  (estimated RA pressure of 8 mmHg included). [facility]: ([address]) 4/2017   of RCA chronic LAD 10-20% RA 4 PA24/9 16 wedge 9 LVEDP markedly elevated 30    Carotid US 10/2017 at [address]:  1. Estimated diameter reduction of the right internal carotid artery is  less than 50%.   2.  Estimated diameter reduction of the left internal carotid apnea  Intolerant to CPAP    Follow up in 1 month. This note was scribed in the presence of Dr Juliocesar Good, by Trev Marquez RN  Physician Attestation:  The scribes documentation has been prepared under my direction and personally reviewed by me in its entirety. I confirm that the note above accurately reflects all work, treatment, procedures, and medical decision making performed by me.     [address], [address] Jon Rayo [facility]  Office: [phone]  Fax: [phone]

## 2020-03-03 NOTE — PATIENT INSTRUCTIONS
Patient Education        Atrial Fibrillation: Care Instructions  Your Care Instructions    Atrial fibrillation is an irregular and often fast heartbeat. Treating this condition is important for several reasons. It can cause blood clots, which can travel from your heart to your brain and cause a stroke. If you have a fast heartbeat, you may feel lightheaded, dizzy, and weak. An irregular heartbeat can also increase your risk for heart failure. Atrial fibrillation is often the result of another heart condition, such as high blood pressure or coronary artery disease. Making changes to improve your heart condition will help you stay healthy and active. Follow-up care is a key part of your treatment and safety. Be sure to make and go to all appointments, and call your doctor if you are having problems. It's also a good idea to know your test results and keep a list of the medicines you take. How can you care for yourself at home? Medicines    · Take your medicines exactly as prescribed. Call your doctor if you think you are having a problem with your medicine. You will get more details on the specific medicines your doctor prescribes.     · If your doctor has given you a blood thinner to prevent a stroke, be sure you get instructions about how to take your medicine safely. Blood thinners can cause serious bleeding problems.     · Do not take any vitamins, over-the-counter drugs, or herbal products without talking to your doctor first.    Lifestyle changes    · Do not smoke. Smoking can increase your chance of a stroke and heart attack. If you need help quitting, talk to your doctor about stop-smoking programs and medicines. These can increase your chances of quitting for good.     · Eat a heart-healthy diet.     · Stay at a healthy weight. Lose weight if you need to.     · Limit alcohol to 2 drinks a day for men and 1 drink a day for women. Too much alcohol can cause health problems.     · Avoid colds and flu.  Get · You have symptoms of a stroke. These may include:  ? Sudden numbness, tingling, weakness, or loss of movement in your face, arm, or leg, especially on only one side of your body. ? Sudden vision changes. ? Sudden trouble speaking. ? Sudden confusion or trouble understanding simple statements. ? Sudden problems with walking or balance. ? A sudden, severe headache that is different from past headaches.     · You passed out (lost consciousness).    Call your doctor now or seek immediate medical care if:    · You have new or increased shortness of breath.     · You feel dizzy or lightheaded, or you feel like you may faint.     · Your heart rate becomes irregular.     · You can feel your heart flutter in your chest or skip heartbeats. Tell your doctor if these symptoms are new or worse.    Watch closely for changes in your health, and be sure to contact your doctor if you have any problems. Where can you learn more? Go to [email or web address] and sign in to your [employer] account. Enter U020 in the [employer] box to learn more about \"Atrial Fibrillation: Care Instructions. \"     If you do not have an account, please click on the \"Sign Up Now\" link. Current as of: April 9, 2019  Content Version: 12.3  © 7563-2216 Healthwise, Incorporated. Care instructions adapted under license by [facility] ([facility]). If you have questions about a medical condition or this instruction, always ask your healthcare professional. [place] [address] any warranty or liability for your use of this information.

## 2020-03-13 ENCOUNTER — OFFICE VISIT (OUTPATIENT)
Dept: FAMILY MEDICINE CLINIC | Age: 75
End: 2020-03-13
Payer: MEDICARE

## 2020-03-13 VITALS
WEIGHT: 217 LBS | DIASTOLIC BLOOD PRESSURE: 74 MMHG | HEIGHT: 64 IN | BODY MASS INDEX: 37.05 KG/M2 | HEART RATE: 64 BPM | TEMPERATURE: 97.5 F | SYSTOLIC BLOOD PRESSURE: 118 MMHG

## 2020-03-13 PROCEDURE — 1123F ACP DISCUSS/DSCN MKR DOCD: CPT | Performed by: FAMILY MEDICINE

## 2020-03-13 PROCEDURE — 1090F PRES/ABSN URINE INCON ASSESS: CPT | Performed by: FAMILY MEDICINE

## 2020-03-13 PROCEDURE — 99213 OFFICE O/P EST LOW 20 MIN: CPT | Performed by: FAMILY MEDICINE

## 2020-03-13 PROCEDURE — 3017F COLORECTAL CA SCREEN DOC REV: CPT | Performed by: FAMILY MEDICINE

## 2020-03-13 PROCEDURE — 4040F PNEUMOC VAC/ADMIN/RCVD: CPT | Performed by: FAMILY MEDICINE

## 2020-03-13 PROCEDURE — G8484 FLU IMMUNIZE NO ADMIN: HCPCS | Performed by: FAMILY MEDICINE

## 2020-03-13 PROCEDURE — G8427 DOCREV CUR MEDS BY ELIG CLIN: HCPCS | Performed by: FAMILY MEDICINE

## 2020-03-13 PROCEDURE — 1111F DSCHRG MED/CURRENT MED MERGE: CPT | Performed by: FAMILY MEDICINE

## 2020-03-13 PROCEDURE — G8417 CALC BMI ABV UP PARAM F/U: HCPCS | Performed by: FAMILY MEDICINE

## 2020-03-13 PROCEDURE — 1036F TOBACCO NON-USER: CPT | Performed by: FAMILY MEDICINE

## 2020-03-13 PROCEDURE — G8399 PT W/DXA RESULTS DOCUMENT: HCPCS | Performed by: FAMILY MEDICINE

## 2020-03-13 RX ORDER — BUSPIRONE HYDROCHLORIDE 5 MG/1
5 TABLET ORAL 2 TIMES DAILY
Qty: 60 TABLET | Refills: 1 | Status: SHIPPED
Start: 2020-03-13 | End: 2020-10-22 | Stop reason: ALTCHOICE

## 2020-03-13 NOTE — PROGRESS NOTES
Subjective:      Patient ID: Carol Key is a 77 y.o. female. Patient presents with: Follow-Up from Hospital: [facility] 2- \"CHF\"    Here for the above. She is feeling actually very well. She is tired though. She is bothered with anxiety a lot  D/c from the hospital with acute on chronic diastolic failure  She is to see person for the afib soon   No cp no fever.  Her sob is fine unless she exerts self   She does not note heart racing  No abdomen pain  appetite is fine   bm some constipation no blood for about a week  Urine is ok no pain no blood  No edema  She has lab pending    YOB: 1945    Date of Visit:  3/13/2020     -- Morphine -- Rash    --  rash   -- Other -- Rash    Current Outpatient Medications:  amiodarone (CORDARONE) 200 MG tablet, Take 1 tablet by mouth daily, Disp: 30 tablet, Rfl: 3  Arformoterol Tartrate (BROVANA) 15 MCG/2ML NEBU, Take 2 mLs by nebulization 2 times daily, Disp: 120 mL, Rfl: 3  hydrALAZINE (APRESOLINE) 50 MG tablet, Take 1 tablet by mouth every 8 hours, Disp: 90 tablet, Rfl: 3  NIFEdipine (ADALAT CC) 30 MG extended release tablet, Take 1 tablet by mouth daily, Disp: 30 tablet, Rfl: 3  torsemide (DEMADEX) 20 MG tablet, Take 1 tablet by mouth daily, Disp: 30 tablet, Rfl: 3  albuterol (PROVENTIL) (2.5 MG/3ML) 0.083% nebulizer solution, Take 3 mLs by nebulization every 4 hours as needed for Wheezing, Disp: 120 mL, Rfl: 5  rivaroxaban (XARELTO) 20 MG TABS tablet, Take 1 tablet by mouth daily (with breakfast), Disp: 30 tablet, Rfl: 3  metoprolol succinate (TOPROL XL) 50 MG extended release tablet, TAKE ONE TABLET BY MOUTH DAILY, Disp: 90 tablet, Rfl: 3  albuterol sulfate HFA (PROAIR HFA) 108 (90 Base) MCG/ACT inhaler, Inhale 2 puffs into the lungs every 4 hours as needed for Wheezing or Shortness of Breath, Disp: 1 Inhaler, Rfl: 11  budesonide (PULMICORT) 0.5 MG/2ML nebulizer suspension, Take 2 mLs by nebulization 2 times daily, Disp: 360 mL, Rfl: 3  formoterol tachypnea, accessory muscle usage or respiratory distress. Breath sounds: Normal breath sounds. No decreased breath sounds, wheezing, rhonchi or rales. Lymphadenopathy:      Cervical: No cervical adenopathy. Upper Body:      Right upper body: No supraclavicular adenopathy. Left upper body: No supraclavicular adenopathy. Skin:     General: Skin is warm and dry. Coloration: Skin is not pale. Neurological:      Mental Status: She is alert. Assessment:        Diagnosis Orders   1. Hospital discharge follow-up     2. Acute on chronic diastolic heart failure ([address]     3. Anxiety       Orders Placed This Encounter   Medications    busPIRone (BUSPAR) 5 MG tablet     Sig: Take 1 tablet by mouth 2 times daily     Dispense:  60 tablet     Refill:  1           Plan:      Start the anxiety medicine to take daily every day for the anxiety.  Use it twice a day  See me in 3 months        Gustabo Salcido MD

## 2020-03-19 ENCOUNTER — TELEPHONE (OUTPATIENT)
Dept: FAMILY MEDICINE CLINIC | Age: 75
End: 2020-03-19

## 2020-03-30 ENCOUNTER — TELEPHONE (OUTPATIENT)
Dept: FAMILY MEDICINE CLINIC | Age: 75
End: 2020-03-30

## 2020-03-30 NOTE — TELEPHONE ENCOUNTER
Papa Son that the office would like to change her appt to a video visit. She states she doesn't know how and didn't want my help in setting this up. She states she is doing fine and will just cancel the appt. She states she is not leaving her house during this time either.

## 2020-04-07 ENCOUNTER — CARE COORDINATION (OUTPATIENT)
Dept: CASE MANAGEMENT | Age: 75
End: 2020-04-07

## 2020-04-22 RX ORDER — RIVAROXABAN 20 MG/1
TABLET, FILM COATED ORAL
Qty: 30 TABLET | Refills: 2 | Status: SHIPPED | OUTPATIENT
Start: 2020-04-22 | End: 2020-07-22 | Stop reason: SDUPTHER

## 2020-04-24 ENCOUNTER — VIRTUAL VISIT (OUTPATIENT)
Dept: FAMILY MEDICINE CLINIC | Age: 75
End: 2020-04-24
Payer: MEDICARE

## 2020-04-24 PROCEDURE — 99441 PR PHYS/QHP TELEPHONE EVALUATION 5-10 MIN: CPT | Performed by: FAMILY MEDICINE

## 2020-04-24 RX ORDER — CLOTRIMAZOLE 10 MG/1
10 LOZENGE ORAL; TOPICAL
Qty: 50 TABLET | Refills: 0 | Status: SHIPPED | OUTPATIENT
Start: 2020-04-24 | End: 2020-05-04

## 2020-04-24 RX ORDER — DOXYCYCLINE HYCLATE 100 MG/1
100 CAPSULE ORAL 2 TIMES DAILY
Qty: 14 CAPSULE | Refills: 0 | Status: SHIPPED | OUTPATIENT
Start: 2020-04-24 | End: 2020-05-01

## 2020-04-27 RX ORDER — ISOSORBIDE MONONITRATE 30 MG/1
TABLET, EXTENDED RELEASE ORAL
Qty: 90 TABLET | Refills: 0 | Status: SHIPPED | OUTPATIENT
Start: 2020-04-27 | End: 2020-07-27

## 2020-05-11 ENCOUNTER — TELEPHONE (OUTPATIENT)
Dept: FAMILY MEDICINE CLINIC | Age: 75
End: 2020-05-11

## 2020-05-11 NOTE — TELEPHONE ENCOUNTER
No fever runny nose sneeze and congestion.  mucinex some help  2 days she has been having sx  No other concerns  She has saline nasal spray at home and will try that and also use flonase otc  Call me if not better

## 2020-06-02 ENCOUNTER — TELEPHONE (OUTPATIENT)
Dept: FAMILY MEDICINE CLINIC | Age: 75
End: 2020-06-02

## 2020-06-02 RX ORDER — FLUCONAZOLE 150 MG/1
150 TABLET ORAL ONCE
Qty: 1 TABLET | Refills: 0 | Status: SHIPPED | OUTPATIENT
Start: 2020-06-02 | End: 2020-06-02

## 2020-07-22 ENCOUNTER — TELEPHONE (OUTPATIENT)
Dept: SLEEP MEDICINE | Age: 75
End: 2020-07-22

## 2020-07-22 ENCOUNTER — TELEPHONE (OUTPATIENT)
Dept: FAMILY MEDICINE CLINIC | Age: 75
End: 2020-07-22

## 2020-07-22 RX ORDER — FLUCONAZOLE 150 MG/1
150 TABLET ORAL ONCE
Qty: 1 TABLET | Refills: 0 | Status: SHIPPED | OUTPATIENT
Start: 2020-07-22 | End: 2020-07-22

## 2020-07-22 NOTE — TELEPHONE ENCOUNTER
Pt called requesting a refill of Xarelto and Albuterol sent her pharmacy.   Alanis Chang  Nov 8/10/20

## 2020-07-22 NOTE — TELEPHONE ENCOUNTER
Patient has a nasty yeast infection and asking if something could be called in today to [facility].     Please advise, [phone]

## 2020-07-23 RX ORDER — ALBUTEROL SULFATE 90 UG/1
2 AEROSOL, METERED RESPIRATORY (INHALATION) EVERY 4 HOURS PRN
Qty: 1 INHALER | Refills: 11 | Status: SHIPPED | OUTPATIENT
Start: 2020-07-23 | End: 2022-02-10

## 2020-07-27 RX ORDER — ISOSORBIDE MONONITRATE 30 MG/1
TABLET, EXTENDED RELEASE ORAL
Qty: 90 TABLET | Refills: 5 | Status: SHIPPED | OUTPATIENT
Start: 2020-07-27 | End: 2021-08-10

## 2020-08-04 ENCOUNTER — TELEPHONE (OUTPATIENT)
Dept: CARDIOLOGY CLINIC | Age: 75
End: 2020-08-04

## 2020-08-04 ENCOUNTER — OFFICE VISIT (OUTPATIENT)
Dept: CARDIOLOGY CLINIC | Age: 75
End: 2020-08-04
Payer: MEDICARE

## 2020-08-04 VITALS
OXYGEN SATURATION: 97 % | HEIGHT: 64 IN | WEIGHT: 218 LBS | SYSTOLIC BLOOD PRESSURE: 126 MMHG | HEART RATE: 60 BPM | DIASTOLIC BLOOD PRESSURE: 72 MMHG | BODY MASS INDEX: 37.22 KG/M2 | TEMPERATURE: 97.9 F

## 2020-08-04 PROCEDURE — G8427 DOCREV CUR MEDS BY ELIG CLIN: HCPCS | Performed by: INTERNAL MEDICINE

## 2020-08-04 PROCEDURE — 1036F TOBACCO NON-USER: CPT | Performed by: INTERNAL MEDICINE

## 2020-08-04 PROCEDURE — 4040F PNEUMOC VAC/ADMIN/RCVD: CPT | Performed by: INTERNAL MEDICINE

## 2020-08-04 PROCEDURE — 3017F COLORECTAL CA SCREEN DOC REV: CPT | Performed by: INTERNAL MEDICINE

## 2020-08-04 PROCEDURE — 93000 ELECTROCARDIOGRAM COMPLETE: CPT | Performed by: INTERNAL MEDICINE

## 2020-08-04 PROCEDURE — 1090F PRES/ABSN URINE INCON ASSESS: CPT | Performed by: INTERNAL MEDICINE

## 2020-08-04 PROCEDURE — G8399 PT W/DXA RESULTS DOCUMENT: HCPCS | Performed by: INTERNAL MEDICINE

## 2020-08-04 PROCEDURE — 99214 OFFICE O/P EST MOD 30 MIN: CPT | Performed by: INTERNAL MEDICINE

## 2020-08-04 PROCEDURE — 1123F ACP DISCUSS/DSCN MKR DOCD: CPT | Performed by: INTERNAL MEDICINE

## 2020-08-04 PROCEDURE — G8417 CALC BMI ABV UP PARAM F/U: HCPCS | Performed by: INTERNAL MEDICINE

## 2020-08-04 NOTE — PROGRESS NOTES
[facility]  Office Visit    Arnulfo Rivera  1945    August 4, 2020    CC: \"I have legs cramps and a few episodes of atrial fibrillation. \"     HPI:  The patient is 77 y.o. female with a past medical history significant for CAD, atrial fib, HTN, aortic aneurysm and COPD who is here for follow up. Admitted 1/7/2018-1/12/2018  With SOB and chest pain and dx with acute on chronic diastolic HF (diuresed), and atrial fib. Troponins elevated, testing pended due to sepsis from the flu. Outpatient stress testing positive and she underwent cardiac cath on 3/2/18 which revealed  of the RCA and nonobstructive CAD of the LAD and LCX. She also underwent endovascular AAA repair on 3/21/18 by Dr. Aminta Brito. Patient was  in the hospital and had 2/25/20 for atrial fibrillation along with signs and symptoms of heart failure. She converted to sinus rhythm after she was started on amiodarone therapy. Today, she reports a few episodes of atrial fibrillation a few weeks ago. She feels her heart racing. She also stopped Crestor due to leg cramps. She also continues with chronic insomnia. She also continues with chronic LI, unchanged. She reports medical therapy compliance and feels she is tolerating. She denies any abnormal bruising or bleeding pm anticoagulation therapy. Patient denies any symptoms of chest pain, pressure, tightness, shortness of breath at rest, LI, nausea, vomiting, near syncope, syncope, heart racing, palpitations, dizziness, lightheaded, PND, orthopnea, wheezing, diaphoresis, BLE edema, bilateral lower extremities pain, cramping or fatigue. Review of Systems:  Constitutional: Denies falls, night sweats or fever. Fatigue improved.    HEENT: Denies new visual changes, ringing in ears, nosebleeds, nasal congestion  Respiratory: + SOB (chronic but with improvement)  Cardiovascular: see HPI  GI: Denies N/V, diarrhea, constipation, abdominal pain, change in bowel habits, melena or hematochezia  : Denies urinary frequency, urgency, incontinence, hematuria or dysuria. Skin: Denies rash, hives, or cyanosis  Musculoskeletal: Denies joint or muscle aches/pain  Neurological: Denies syncope or TIA-like symptoms. Psychiatric: Denies anxiety or depression  + insomnia     Past Medical History:   Diagnosis Date    AAA (abdominal aortic aneurysm) ([facility] [address]     pt states it is 4cm    AAA (abdominal aortic aneurysm) without rupture ([facility] [address] 2/10/2015    Atrial fibrillation (HCC)     CAD (coronary artery disease)     CHF (congestive heart failure) ([facility] [address]     [facility]     History of blood clots     Hyperlipidemia     Hypertension      Past Surgical History:   Procedure Laterality Date    ABDOMINAL AORTIC ANEURYSM REPAIR      Endovascular abdominal AA    APPENDECTOMY      incidental    BLADDER SUSPENSION      CATARACT REMOVAL      CHOLECYSTECTOMY  10/15/13    COLONOSCOPY  07    dr Chan Negron and check in 5 years.  COLONOSCOPY  2017    ok dr arndt, repeat 5 years   [address]    for benign tumor.  just the uterus    JOINT REPLACEMENT  2013    right knee replacement    TONSILLECTOMY  as a child    TUMOR EXCISION      benign behind right ear about      Family History   Problem Relation Age of Onset    Heart Disease Father     Hypertension Other      Social History     Tobacco Use    Smoking status: Former Smoker     Packs/day: 1.00     Years: 37.00     Pack years: 37.00     Types: Cigarettes     Start date: 1976     Last attempt to quit: 2013     Years since quittin.8    Smokeless tobacco: Never Used   Substance Use Topics    Alcohol use: No    Drug use: No       Allergies   Allergen Reactions    Morphine Rash     rash    Other Rash     Current Outpatient Medications   Medication Sig Dispense Refill    isosorbide mononitrate (IMDUR) 30 MG extended release tablet TAKE ONE TABLET BY MOUTH DAILY 90 tablet 5    rivaroxaban (XARELTO) 20 MG TABS tablet TAKE ONE TABLET BY MOUTH DAILY WITH BREAKFAST 30 tablet 5    albuterol sulfate HFA (PROAIR HFA) 108 (90 Base) MCG/ACT inhaler Inhale 2 puffs into the lungs every 4 hours as needed for Wheezing or Shortness of Breath 1 Inhaler 11    busPIRone (BUSPAR) 5 MG tablet Take 1 tablet by mouth 2 times daily 60 tablet 1    amiodarone (CORDARONE) 200 MG tablet Take 1 tablet by mouth daily 30 tablet 3    Arformoterol Tartrate (BROVANA) 15 MCG/2ML NEBU Take 2 mLs by nebulization 2 times daily 120 mL 3    hydrALAZINE (APRESOLINE) 50 MG tablet Take 1 tablet by mouth every 8 hours 90 tablet 3    NIFEdipine (ADALAT CC) 30 MG extended release tablet Take 1 tablet by mouth daily 30 tablet 3    torsemide (DEMADEX) 20 MG tablet Take 1 tablet by mouth daily 30 tablet 3    clonazePAM (KLONOPIN) 0.5 MG tablet Take 1 tablet by mouth nightly as needed (sleep) for up to 5 days. 5 tablet 0    albuterol (PROVENTIL) (2.5 MG/3ML) 0.083% nebulizer solution Take 3 mLs by nebulization every 4 hours as needed for Wheezing 120 mL 5    metoprolol succinate (TOPROL XL) 50 MG extended release tablet TAKE ONE TABLET BY MOUTH DAILY 90 tablet 3    budesonide (PULMICORT) 0.5 MG/2ML nebulizer suspension Take 2 mLs by nebulization 2 times daily 360 mL 3    formoterol (PERFOROMIST) 20 MCG/2ML nebulizer solution Take 2 mLs by nebulization 2 times daily 120 mL 3    SPIRIVA RESPIMAT 2.5 MCG/ACT AERS inhaler INHALE TWO PUFFS BY MOUTH DAILY 1 Inhaler 4    nitroGLYCERIN (NITROSTAT) 0.4 MG SL tablet Place 1 tablet under the tongue every 5 minutes as needed for Chest pain 25 tablet 1    rosuvastatin (CRESTOR) 40 MG tablet Take 1 tablet by mouth daily 90 tablet 3    Multiple Vitamins-Minerals (MULTI FOR HER 50+ PO) Take 1 tablet by mouth daily       No current facility-administered medications for this visit.         Physical Exam:   /72   Pulse 60   Temp 97.9 °F (36.6 °C)   Ht 5' 4\" (1.626 m)   Wt 218 lb (98.9 kg) Comment: with shoes  SpO2 97%   BMI 37.42 kg/m²   Wt Readings from Last 2 Encounters:   20 217 lb (98.4 kg)   20 216 lb (98 kg)     Physical Exam:   Constitutional: The patient is oriented to person, place, and time. Appears well-developed and well-nourished. In no acute distress. Head: Normocephalic and atraumatic. Pupils equal and round. Neck: Neck supple. No JVP or carotid bruit appreciated. No mass and no thyromegaly present. No lymphadenopathy present. Cardiovascular: Normal rate. Normal heart sounds. Exam reveals no gallop and no friction rub. No murmur heard. Pulmonary/Chest: Effort normal and breath sounds normal. No respiratory distress. No wheezes, rhonchi or rales. Abdominal: Soft, non-tender. Bowel sounds are normal. Exhibits no organomegaly, mass or bruit. Extremities: No edema. No cyanosis or clubbing. Pulses are 2+ radial and carotid bilaterally. Neurological: No gross cranial nerve deficit. Coordination normal.   Skin: Skin is warm and dry. There is no rash or diaphoresis. Psychiatric: Patient has a normal mood and affect. Speech is normal and behavior is normal.       Lab Review:   Lab Results   Component Value Date    TRIG 118 2019    HDL 41 2019    HDL 38 2011    LDLCALC 90 2019    LDLDIRECT 111 2012    LABVLDL 24 2019     Lab Results   Component Value Date     2020    K 4.1 2020    K 4.9 2020    CL 98 2020    CO2 24 2020    BUN 47 2020    CREATININE 1.6 2020    GLUCOSE 96 2020    GLUCOSE 102 2016    CALCIUM 8.6 2020      Lab Results   Component Value Date    WBC 15.9 (H) 2020    HGB 12.1 2020    HCT 36.7 2020    MCV 87.9 2020     2020       EK2018: Sinus rhythm with old anterior infarct  18: Sinus Rhythm, PACs, Old anterior infarct.    19 Sinus Rhythm  20 Sinus  Rhythm  - occasional PAC, # PACs = 1, -Anteroseptal infarct -age undetermined.    -Nonspecific ST depression  -Nondiagnostic. Echo 1/9/2018:  Mild concentric left ventricular hypertrophy. Visually estimated ejection fraction of 65%. Mitral annular calcification. Mild left atrial dilation. Mild aortic stenosis. (mean gradients 63DMGB)  The systolic pulmonary artery pressure (SPAP) estimated at 30 mmHg  (estimated RA pressure of 8 mmHg included). [facility]: ([facility]) 4/2017   of RCA chronic LAD 10-20% RA 4 PA24/9 16 wedge 9 LVEDP markedly elevated 30    Carotid US 10/2017 at [address]:  1. Estimated diameter reduction of the right internal carotid artery is  less than 50%. 2.  Estimated diameter reduction of the left internal carotid artery is  50-69%. 3.  The bilateral common carotid arteries reveal no evidence of   significant stenosis. 4.  There is greater than 50% stenosis of the right external carotid  artery. 5.  There is no evidence of significant stenosis in the left external  carotid artery. 6.  The bilateral vertebral arteries are patent with antegrade flow. 7.  The bilateral subclavian arteries reveal no evidence of significant  stenosis. 8.  There has been no significant change from the previous study of  4/21/2017.     Lexiscan 2/19/18   Summary    Abnormal study. There is a moderate sized, mild intensity, partially    reversible defect of the mid to apical anterior and mid anterolateral walls    which is consistent with ischemia. There is breast attenuation but stress    images appear worse.    Normal LV size and systolic function.    Overall findings represent an intermediate risk study     Cardiac Cath 3/5/18  OVERALL IMPRESSION  1. Again, 100% proximal right coronary artery occlusion with left to right  collaterals. 2.  Mild disease of the distal left main trunk. 3.  20% to 30% ostial left anterior descending artery stenosis with  collaterals from LAD to the right coronary artery.   4.  30% to 40% stenosis of the proximal circumflex artery. 5.  Normal left ventricular systolic function with estimated EF of 55% to  60%. 6.  Slightly enlarged aortic root with no evidence of aortic stenosis or  regurgitation.     In view of the above findings, we will okay the patient for her upcoming  aortic aneurysm surgery from cardiac standpoint. ECHO 1/2/20  There is moderate concentric left ventricular hypertrophy. Patient appears to be in atrial fibrillation. Ejection fraction is estimated to be 50-60%. Indeterminate diastolic function. Mild aortic regurgitation. Mild tricuspid regurgitation. Mild mitral regurgitation with mitral annular calcification    Right Heart Cath 2/28/2020  1.  _____ normal right cardiac pressure. 2.  Elevated pulmonary capillary wedge pressure with a mean pulmonary  capillary wedge of 29 mmHg. 3.  Normal cardiac output and indices. 4.  No oxygen step off to suggest ASD/PFO.    In view of the above findings, we will start the patient on a small dose  of diuretic therapy and see whether we need to adjust some of her  antihypertensive medicines or not. Her findings are consistent with a  diastolic heart failure. Assessment/Plan:    Paroxsymal atrial fibrillation   CHADS VASC score 7 for age, gender, DVT, CHF, HTN, CAD. She reports a few episodes of breakthrough atrial fibrillation since our last visit. Symptoms of heart racing. Her last episode was a few weeks ago. EKG today shows Sinus Rhythm. Continue Xarelto, Amiodarone and Toprol-XL. PFTs (last 7/2015) and TSH 12/5/19 0.66. Will repeat yearly if she stays on amiodarone long-term. We have again discussed regarding evaluation for ablation therapy. She had placed this on hold due to the COVID-19 pandemic but she is willing to discuss this, I will have her see Dr Jaiden Schafer. Coronary artery disease involving native coronary artery of native heart without angina pectoris  She denies any signs or symptoms of angina. Continue medical management.  She reports myalgias with Crestor with resolution of cramping. Her last lipid profile 5/23/19 with LDLc 90. We will investigate into PCSK9 inhibitors. Chronic diastolic heart failure  She appears compensated on exam. She continues with chronic stable LI. Denies any SOB at rest. Continue Torsemide. No aldactone secondary to elevated Cr. Continue B-blocker. Encouraged medication compliance. Last BMP 2/29/20 abnormal but stable, pro-bnp 4858 2/26/20. NYHA class II-III    Essential hypertension  Blood pressure is stable today. AAA (abdominal aortic aneurysm) without rupture   Underwent endovascular AAA repair on 3/21/18 by Dr. Aminta Brito. Follows vascular surgery    Sleep apnea  Intolerant to CPAP. Follow up in 6 month. Instructed to obtain flu vaccine this season and annually. She declines. Complexity of medical decision making-very high    Thank you very much for allowing me to participate in the care of your patient. Please do not hesitate to contact me if you have any questions. Sincerely,  Clarence Moncada MD      [facility], [address] [place] [address]  Ph: [phone]  Fax: [phone]      This note was scribed in the presence of Dr. Adeel Anthony MD by Lucas Strickland RN. Physician Attestation:  The scribes documentation has been prepared under my direction and personally reviewed by me in its entirety. I confirm that the note above accurately reflects all work, treatment, procedures, and medical decision making performed by me.

## 2020-08-04 NOTE — PATIENT INSTRUCTIONS
Patient Education        evolocumab  Pronunciation:  E voe LEYDA ue mab  Brand:  Kevin Licea Pushtronex, Repatha SureClick  What is the most important information I should know about evolocumab? Follow all directions on your medicine label and package. Tell each of your healthcare providers about all your medical conditions, allergies, and all medicines you use. What is evolocumab? Evolocumab works by helping the liver reduce levels of \"bad\" cholesterol (low-density lipoprotein, or LDL) circulating in your blood. Evolocumab is used together with a low-fat diet and other cholesterol-lowering medications in people with homozygous or heterozygous familial hypercholesterolemia (inherited types of high cholesterol). These conditions can cause high blood levels of LDL cholesterol, and can also cause plaque to build up inside your arteries. Evolocumab is also used to help lower the risk of stroke, heart attack, or other heart complications in people with heart or blood vessel problems caused by plaque build-up or hardening in the arteries (also called atherosclerosis, or arteriosclerosis). Evolocumab may also be used for purposes not listed in this medication guide. What should I discuss with my healthcare provider before using evolocumab? You should not use evolocumab if you are allergic to it. Tell your doctor if you have ever had:  · liver or kidney disease; or  · a latex allergy. If you are pregnant, your name may be listed on a pregnancy registry to track the effects of evolocumab on the baby. It may not be safe to breast-feed while using this medicine. Ask your doctor about any risk. Do not give this medicine to a child without medical advice. Evolocumab is not approved for use by anyone younger than 15years old. For certain conditions, evolocumab should not be given to a child of any age. How is evolocumab given?   Follow all directions on your prescription label and read all medication guides or instruction sheets. Use the medicine exactly as directed. Evolocumab is injected under the skin. A healthcare provider may teach you how to properly use the medication by yourself. Evolocumab is available in a prefilled syringe, a SureClick prefilled autoinjector, or a Pushtronex on-body infusor with prefilled cartridge. Read and carefully follow any Instructions for Use provided with your medicine. Ask your doctor or pharmacist if you have questions, or call the  at [phone]. The Pushtronex on-body infusor is a special device placed on the skin that delivers your evolocumab dose slowly. You will need to wear the device for about 9 minutes to get the full dose. While wearing the on-body infusor, you may perform moderate activities such as walking, bending, or reaching. Your care provider will show you the best places on your body to inject evolocumab or place the on-body infusor. Use a different place each time you give an injection. Do not inject into the same place two times in a row. Each single-use prefilled syringe, cartridge, or injection device is for one use only. Throw away after one use, even if there is still some medicine left inside. Follow any state or local laws about throwing away used needles and syringes. Use a puncture-proof \"sharps\" container. Follow state or local laws about how to dispose of this container. Keep it out of the reach of children and pets. Store evolocumab in the refrigerator in its original carton and protect from light and heat. Do not freeze. Throw away any evolocumab that has been frozen. Take the medicine out of the refrigerator and let it reach room temperature for 30 to 45 minutes before injecting your dose. Do not heat a syringe or injection device. You may also store evolocumab in the original carton at cool room temperature, away from light and heat. Use the medicine within 30 days if it is kept at room temperature.   Handle this medicine carefully. Dropping an injection device can cause it to break. Do not use an injection device that has been dropped onto a hard surface, even if you cannot see a break in it. Call your pharmacist for new medicine. Do not shake this medicine. Do not use if the medicine has changed colors or has particles in it. Call your pharmacist for new medicine. You should not stop using evolocumab without your doctor's advice, or your LDL cholesterol levels may increase. Evolocumab is only part of a complete treatment program that also includes diet, statin medication, and regular blood testing. Follow your doctor's instructions very closely. What happens if I miss a dose? Use the missed dose within 7 days after that injection was due, but skip the missed dose if you are more than 7 days late. After a missed dose, go back to your original schedule and use the medicine again when your next scheduled dose is due. Do not use two doses at one time. What happens if I overdose? Seek emergency medical attention or call the Poison Help line at 1-456.958.5662. What should I avoid while using evolocumab? Do not inject evolocumab into skin that is bruised, sore, scarred, or hardened. What are the possible side effects of evolocumab? Get emergency medical help if you have signs of an allergic reaction: hives, severe itching; difficult breathing; swelling of your face, lips, tongue, or throat. Call your doctor at once if you have:  · high blood sugar --increased thirst, increased urination, dry mouth, fruity breath odor. Common side effects may include:  · redness, pain, or bruising where an injection was given;  · back pain;  · flu symptoms; or  · cold symptoms such as stuffy nose, sneezing, sore throat. This is not a complete list of side effects and others may occur. Call your doctor for medical advice about side effects. You may report side effects to FDA at 1-364-FDA-5721.   What other drugs will affect nurse or pharmacist.  Copyright 0732-9226 [employer]. Version: 2.02. Revision date: 11/7/2018. Care instructions adapted under license by [facility] ([facility]). If you have questions about a medical condition or this instruction, always ask your healthcare professional. [address] any warranty or liability for your use of this information. Patient Education        alirocumab  Pronunciation:  AL i ROK ue mab  Brand:  Praluent Pen, Praluent Syringe  What is the most important information I should know about alirocumab? Follow all directions on your medicine label and package. Tell each of your healthcare providers about all your medical conditions, allergies, and all medicines you use. What is alirocumab? Alirocumab is used in people with heart disease, to help lower the risk of heart attack, stroke, or needing to be hospitalized because of chest pain (unstable angina). Alirocumab is also used to help lower blood levels of LDL cholesterol in people with heterozygous familial hypercholesterolemia (an inherited type of high cholesterol). Alirocumab is used together with a low-fat diet and sometimes in combination with a \"statin\" medication. Alirocumab may also be used for purposes not listed in this medication guide. What should I discuss with my healthcare provider before using alirocumab? You should not use alirocumab if you are allergic to it. It is not known whether this medicine will harm an unborn baby. Tell your doctor if you are pregnant or plan to become pregnant. You should not breastfeed while using this medicine. Alirocumab is not approved for use by anyone younger than 25years old. How should I use alirocumab? Follow all directions on your prescription label and read all medication guides or instruction sheets. Your doctor may occasionally change your dose. Use the medicine exactly as directed. Alirocumab is injected under the skin once every 2 to 4 weeks.  A healthcare provider may teach you how to properly use the medication by yourself. Read and carefully follow any Instructions for Use provided with your medicine. Ask your doctor or pharmacist if you don't understand all instructions. If you use alirocumab only once every 4 weeks, you will give yourself 2 separate injections at the same time. Use a new syringe or injection pen for each injection. Give each injection into a different place on your body. You may need frequent blood tests. Do not shake this medicine. Prepare an injection only when you are ready to give it. Do not use if the medicine has changed colors, or has particles in it. Call your pharmacist for new medicine. Store this medicine in the refrigerator, do not freeze. Protect from light and high heat. Take the medicine out of the refrigerator and allow it to reach room temperature for 30 to 40 minutes before injecting your dose. Do not heat an injection pen or prefilled syringe,  and do not leave the pen at room temperature for longer than 30 days. Each injection pen or prefilled syringe is for one use only. Throw it away after one use, even if there is still medicine left inside. Use a needle and syringe only once and then place them in a puncture-proof \"sharps\" container. Follow state or local laws about how to dispose of this container. Keep it out of the reach of children and pets. You should not stop using alirocumab without your doctor's advice, or your LDL cholesterol levels may increase. What happens if I miss a dose? Give an injection within 7 days after the missed dose. Then give the next injection 2 to 4 weeks after the missed dose was due, to put you back on your regular injection schedule. If you are more than 7 days late for an injection:   · If you inject every 2 weeks, skip the missed dose and use your next dose at the regular time.   · If you inject every 4 weeks, start a new schedule based on the date you used the missed injection. Do not use two doses at one time. What happens if I overdose? Seek emergency medical attention or call the Poison Help line at 1-920.935.9110. What should I avoid while using alirocumab? Do not inject alirocumab into skin that is sunburned, infected, swollen, or otherwise irritated. What are the possible side effects of alirocumab? Get emergency medical help if you have signs of an allergic reaction: hives, severe itching; difficult breathing; swelling of your face, lips, tongue, or throat. Common side effects may include:  · redness, itching, soreness, or swelling where an injection was given;  · flu symptoms; or  · cold symptoms such as stuffy nose, sneezing, sore throat. This is not a complete list of side effects and others may occur. Call your doctor for medical advice about side effects. You may report side effects to FDA at 0-772-DZP-0402. What other drugs will affect alirocumab? Other drugs may affect alirocumab, including prescription and over-the-counter medicines, vitamins, and herbal products. Tell your doctor about all your current medicines and any medicine you start or stop using. Where can I get more information? Your pharmacist can provide more information about alirocumab. Remember, keep this and all other medicines out of the reach of children, never share your medicines with others, and use this medication only for the indication prescribed. Every effort has been made to ensure that the information provided by [address] is accurate, up-to-date, and complete, but no guarantee is made to that effect. Drug information contained herein may be time sensitive. [facility] information has been compiled for use by healthcare practitioners and consumers in the [place] and therefore [facility] does not warrant that uses outside of the [place] are appropriate, unless specifically indicated otherwise.  [facility]'s drug information does not endorse drugs, diagnose patients or recommend therapy. [facility] drug information is an informational resource designed to assist licensed healthcare practitioners in caring for their patients and/or to serve consumers viewing this service as a supplement to, and not a substitute for, the expertise, skill, knowledge and judgment of healthcare practitioners. The absence of a warning for a given drug or drug combination in no way should be construed to indicate that the drug or drug combination is safe, effective or appropriate for any given patient. [facility] does not assume any responsibility for any aspect of healthcare administered with the aid of information [facility] provides. The information contained herein is not intended to cover all possible uses, directions, precautions, warnings, drug interactions, allergic reactions, or adverse effects. If you have questions about the drugs you are taking, check with your doctor, nurse or pharmacist.  Copyright 4437-3200 [facility] [address] MCK[employer]. Version: 4.01. Revision date: 5/29/2019. Care instructions adapted under license by [facility] ([facility]). If you have questions about a medical condition or this instruction, always ask your healthcare professional. [place] [address] any warranty or liability for your use of this information. Patient Education        High Cholesterol: Care Instructions  Your Care Instructions     Cholesterol is a type of fat in your blood. It is needed for many body functions, such as making new cells. Cholesterol is made by your body. It also comes from food you eat. High cholesterol means that you have too much of the fat in your blood. This raises your risk of a heart attack and stroke. LDL and HDL are part of your total cholesterol. LDL is the \"bad\" cholesterol. High LDL can raise your risk for heart disease, heart attack, and stroke. HDL is the \"good\" cholesterol. It helps clear bad cholesterol from the body.  High HDL is linked with a lower risk of heart disease, heart attack, and stroke. Your cholesterol levels help your doctor find out your risk for having a heart attack or stroke. You and your doctor can talk about whether you need to lower your risk and what treatment is best for you. A heart-healthy lifestyle along with medicines can help lower your cholesterol and your risk. The way you choose to lower your risk will depend on how high your risk is for heart attack and stroke. It will also depend on how you feel about taking medicines. Follow-up care is a key part of your treatment and safety. Be sure to make and go to all appointments, and call your doctor if you are having problems. It's also a good idea to know your test results and keep a list of the medicines you take. How can you care for yourself at home? · Eat a variety of foods every day. Good choices include fruits, vegetables, whole grains (like oatmeal), dried beans and peas, nuts and seeds, soy products (like tofu), and fat-free or low-fat dairy products. · Replace butter, margarine, and hydrogenated or partially hydrogenated oils with olive and canola oils. (Canola oil margarine without trans fat is fine.)  · Replace red meat with fish, poultry, and soy protein (like tofu). · Limit processed and packaged foods like chips, crackers, and cookies. · Bake, broil, or steam foods. Don't torres them. · Be physically active. Get at least 30 minutes of exercise on most days of the week. Walking is a good choice. You also may want to do other activities, such as running, swimming, cycling, or playing tennis or team sports. · Stay at a healthy weight or lose weight by making the changes in eating and physical activity listed above. Losing just a small amount of weight, even 5 to 10 pounds, can reduce your risk for having a heart attack or stroke. · Do not smoke. When should you call for help?   Watch closely for changes in your health, and be sure to contact your doctor if:  · You need help making lifestyle changes. · You have questions about your medicine. Where can you learn more? Go to [email or web address].healthFundamo ([employer]). org and sign in to your [employer] account. Enter A836 in the [facility] box to learn more about \"High Cholesterol: Care Instructions. \"     If you do not have an account, please click on the \"Sign Up Now\" link. Current as of: December 16, 2019               Content Version: 12.5  © 7851-7419 [employer]. Care instructions adapted under license by [facility] ([facility]). If you have questions about a medical condition or this instruction, always ask your healthcare professional. [address] any warranty or liability for your use of this information.

## 2020-08-04 NOTE — TELEPHONE ENCOUNTER
Dr. Liu Trujillo saw patient today and would like to start her on PCSK9 Inhibitors as she is not tolerating statin therapy. Patient is agreeable. Please see if we can get approval for either Repatha or Praluent. Thanks.

## 2020-08-05 ENCOUNTER — TELEPHONE (OUTPATIENT)
Dept: CARDIOLOGY CLINIC | Age: 75
End: 2020-08-05

## 2020-08-05 NOTE — TELEPHONE ENCOUNTER
I called Sangeeta Lopezr this afternoon and [facility] to let her know that I have her PFT and COVID test set up as discussed yesterday in the office with Dr. Jose Mary. If she calls back, please give her date, time, and instructions below. COVID Test:   August 28th, 2020  [id number] [address], [address]    PFT Test:   [facility]  September 4th, 2020  PREPS:   * Arrive 15 minutes prior to scheduled time    * PFT & Stress test ok on same day. Do stress first. PFT & NUC Stress ok same day. * No caffeine from midnight on. *  No smoking from midnight on. *  May eat light meal two hours prior to test.--(toast + juice)    *  Wear loose fitting clothes around neck. and waist    * No inhalers 3 hours prior to test.    *You will need to take a COVID-19 test 7 days prior to your exam and have a negative test result upon arrival. Documentation will be needed showing that you tested negative upon arriving for your appointment.  -If you do not have a negative result on the date of service, you will need to be rescheduled.

## 2020-08-06 ENCOUNTER — OFFICE VISIT (OUTPATIENT)
Dept: PRIMARY CARE CLINIC | Age: 75
End: 2020-08-06
Payer: MEDICARE

## 2020-08-06 PROCEDURE — G8417 CALC BMI ABV UP PARAM F/U: HCPCS | Performed by: NURSE PRACTITIONER

## 2020-08-06 PROCEDURE — 99211 OFF/OP EST MAY X REQ PHY/QHP: CPT | Performed by: NURSE PRACTITIONER

## 2020-08-06 PROCEDURE — G8428 CUR MEDS NOT DOCUMENT: HCPCS | Performed by: NURSE PRACTITIONER

## 2020-08-06 NOTE — PROGRESS NOTES
Patient presented to [facility] drive up clinic for preop testing. Patient was swabbed and given information advising them to remain isolated until procedure date.

## 2020-08-07 LAB — SARS-COV-2, NAA: NOT DETECTED

## 2020-08-10 ENCOUNTER — OFFICE VISIT (OUTPATIENT)
Dept: PULMONOLOGY | Age: 75
End: 2020-08-10
Payer: MEDICARE

## 2020-08-10 ENCOUNTER — OFFICE VISIT (OUTPATIENT)
Dept: FAMILY MEDICINE CLINIC | Age: 75
End: 2020-08-10
Payer: MEDICARE

## 2020-08-10 VITALS
HEART RATE: 64 BPM | WEIGHT: 219.8 LBS | HEIGHT: 64 IN | SYSTOLIC BLOOD PRESSURE: 138 MMHG | DIASTOLIC BLOOD PRESSURE: 78 MMHG | TEMPERATURE: 97.2 F | BODY MASS INDEX: 37.52 KG/M2

## 2020-08-10 VITALS
HEART RATE: 63 BPM | TEMPERATURE: 98.1 F | SYSTOLIC BLOOD PRESSURE: 138 MMHG | BODY MASS INDEX: 36.7 KG/M2 | DIASTOLIC BLOOD PRESSURE: 86 MMHG | OXYGEN SATURATION: 95 % | WEIGHT: 215 LBS | RESPIRATION RATE: 16 BRPM | HEIGHT: 64 IN

## 2020-08-10 PROCEDURE — G8926 SPIRO NO PERF OR DOC: HCPCS | Performed by: INTERNAL MEDICINE

## 2020-08-10 PROCEDURE — G8427 DOCREV CUR MEDS BY ELIG CLIN: HCPCS | Performed by: FAMILY MEDICINE

## 2020-08-10 PROCEDURE — 1090F PRES/ABSN URINE INCON ASSESS: CPT | Performed by: FAMILY MEDICINE

## 2020-08-10 PROCEDURE — 1123F ACP DISCUSS/DSCN MKR DOCD: CPT | Performed by: FAMILY MEDICINE

## 2020-08-10 PROCEDURE — 1036F TOBACCO NON-USER: CPT | Performed by: INTERNAL MEDICINE

## 2020-08-10 PROCEDURE — G8399 PT W/DXA RESULTS DOCUMENT: HCPCS | Performed by: FAMILY MEDICINE

## 2020-08-10 PROCEDURE — 4040F PNEUMOC VAC/ADMIN/RCVD: CPT | Performed by: FAMILY MEDICINE

## 2020-08-10 PROCEDURE — 3023F SPIROM DOC REV: CPT | Performed by: INTERNAL MEDICINE

## 2020-08-10 PROCEDURE — 3017F COLORECTAL CA SCREEN DOC REV: CPT | Performed by: FAMILY MEDICINE

## 2020-08-10 PROCEDURE — 3017F COLORECTAL CA SCREEN DOC REV: CPT | Performed by: INTERNAL MEDICINE

## 2020-08-10 PROCEDURE — G8417 CALC BMI ABV UP PARAM F/U: HCPCS | Performed by: INTERNAL MEDICINE

## 2020-08-10 PROCEDURE — 4040F PNEUMOC VAC/ADMIN/RCVD: CPT | Performed by: INTERNAL MEDICINE

## 2020-08-10 PROCEDURE — 1090F PRES/ABSN URINE INCON ASSESS: CPT | Performed by: INTERNAL MEDICINE

## 2020-08-10 PROCEDURE — 99213 OFFICE O/P EST LOW 20 MIN: CPT | Performed by: FAMILY MEDICINE

## 2020-08-10 PROCEDURE — G8417 CALC BMI ABV UP PARAM F/U: HCPCS | Performed by: FAMILY MEDICINE

## 2020-08-10 PROCEDURE — 99213 OFFICE O/P EST LOW 20 MIN: CPT | Performed by: INTERNAL MEDICINE

## 2020-08-10 PROCEDURE — G8399 PT W/DXA RESULTS DOCUMENT: HCPCS | Performed by: INTERNAL MEDICINE

## 2020-08-10 PROCEDURE — 1036F TOBACCO NON-USER: CPT | Performed by: FAMILY MEDICINE

## 2020-08-10 PROCEDURE — G8427 DOCREV CUR MEDS BY ELIG CLIN: HCPCS | Performed by: INTERNAL MEDICINE

## 2020-08-10 PROCEDURE — 1123F ACP DISCUSS/DSCN MKR DOCD: CPT | Performed by: INTERNAL MEDICINE

## 2020-08-10 ASSESSMENT — ENCOUNTER SYMPTOMS
NAUSEA: 0
CONSTIPATION: 0
ABDOMINAL PAIN: 0
BLOOD IN STOOL: 0
CHEST TIGHTNESS: 0
ABDOMINAL DISTENTION: 0
VOMITING: 0
DIARRHEA: 0
COUGH: 0

## 2020-08-10 NOTE — PROGRESS NOTES
Chief complaint  This is a 77y.o. year old female  who comes to see me with a chief complaint of   Chief Complaint   Patient presents with    Follow-up     copd       HPI  Here with CC of COPD    She is doing 50-50. Back on pulmicort and brovana nebs with prn albuterol. Has been going back and forth between nebs and symbicort/spiriva. She is not on spiriva or symbicort. Wheezes from time to time but does not always take her scheduled nebs. Never did 6 MWT to assess for hypoxia and is not interested. Due to have an ablation for afib next month. She is worried about being on metoprolol and the side effects it causes in the lungs. Past Medical History:   Diagnosis Date    AAA (abdominal aortic aneurysm) ([address]     pt states it is 4cm    AAA (abdominal aortic aneurysm) without rupture (HCC) 2/10/2015    Atrial fibrillation (HCC)     CAD (coronary artery disease)     CHF (congestive heart failure) ([address]     COPD (chronic obstructive pulmonary disease) (HCC)     History of blood clots     Hyperlipidemia     Hypertension        Past Surgical History:   Procedure Laterality Date    ABDOMINAL AORTIC ANEURYSM REPAIR      Endovascular abdominal AA    APPENDECTOMY  1990    incidental    BLADDER SUSPENSION      CATARACT REMOVAL      CHOLECYSTECTOMY  10/15/13    COLONOSCOPY  9/2/07    dr Malvin Crocker and check in 5 years.  COLONOSCOPY  06/16/2017    ok dr arndt, repeat 5 years   [address]    for benign tumor.  just the uterus    JOINT REPLACEMENT  12/2013    right knee replacement    TONSILLECTOMY  as a child    TUMOR EXCISION      benign behind right ear about 2008     Current Outpatient Medications   Medication Sig Dispense Refill    isosorbide mononitrate (IMDUR) 30 MG extended release tablet TAKE ONE TABLET BY MOUTH DAILY 90 tablet 5    rivaroxaban (XARELTO) 20 MG TABS tablet TAKE ONE TABLET BY MOUTH DAILY WITH BREAKFAST 30 tablet 5    albuterol sulfate HFA (PROAIR HFA) 108 (90 Base) MCG/ACT inhaler Inhale 2 puffs into the lungs every 4 hours as needed for Wheezing or Shortness of Breath 1 Inhaler 11    busPIRone (BUSPAR) 5 MG tablet Take 1 tablet by mouth 2 times daily 60 tablet 1    amiodarone (CORDARONE) 200 MG tablet Take 1 tablet by mouth daily 30 tablet 3    Arformoterol Tartrate (BROVANA) 15 MCG/2ML NEBU Take 2 mLs by nebulization 2 times daily 120 mL 3    hydrALAZINE (APRESOLINE) 50 MG tablet Take 1 tablet by mouth every 8 hours 90 tablet 3    NIFEdipine (ADALAT CC) 30 MG extended release tablet Take 1 tablet by mouth daily 30 tablet 3    torsemide (DEMADEX) 20 MG tablet Take 1 tablet by mouth daily 30 tablet 3    clonazePAM (KLONOPIN) 0.5 MG tablet Take 1 tablet by mouth nightly as needed (sleep) for up to 5 days. 5 tablet 0    albuterol (PROVENTIL) (2.5 MG/3ML) 0.083% nebulizer solution Take 3 mLs by nebulization every 4 hours as needed for Wheezing 120 mL 5    metoprolol succinate (TOPROL XL) 50 MG extended release tablet TAKE ONE TABLET BY MOUTH DAILY 90 tablet 3    budesonide (PULMICORT) 0.5 MG/2ML nebulizer suspension Take 2 mLs by nebulization 2 times daily 360 mL 3    formoterol (PERFOROMIST) 20 MCG/2ML nebulizer solution Take 2 mLs by nebulization 2 times daily 120 mL 3    nitroGLYCERIN (NITROSTAT) 0.4 MG SL tablet Place 1 tablet under the tongue every 5 minutes as needed for Chest pain 25 tablet 1    Multiple Vitamins-Minerals (MULTI FOR HER 50+ PO) Take 1 tablet by mouth daily       No current facility-administered medications for this visit.         PHYSICAL EXAM:  Vitals:    08/10/20 0852   BP: 138/86   Pulse: 63   Resp: 16   Temp: 98.1 °F (36.7 °C)   SpO2: 95%       PE  GENERAL:  Well nourished, alert  HEENT:  No scleral icterus, no conjunctival irritation  NECK:  No thyromegaly, no bruits  LYMPH:  No cervical or supraclavicular adenopathy  HEART:  Regular rate and rhythm, no murmurs  LUNGS:  Poor air movement with faint wheezing   ABDOMEN:  No distention, no organomegaly  EXTREMITIES:  +  edema, no digital clubbing  NEURO:  No localizing deficits, CN II-XII intact    Pulmonary Function Testing 7/2015  Severe obstruction with no response to bronchodilators    Moderate Restriction    Moderate Reduction in diffusing capacity     Chest imaging from 2/2020 is reviewed. My impression is no obvious abnormality     ECHO 1/2020  There is moderate concentric left ventricular hypertrophy. Patient appears to be in atrial fibrillation. Ejection fraction is estimated to be 50-60%. Indeterminate diastolic function. Mild aortic regurgitation. Mild tricuspid regurgitation. Mild mitral regurgitation with mitral annular calcification      Lab Results   Component Value Date    WBC 15.9 (H) 02/28/2020    HGB 12.1 02/28/2020    HCT 36.7 02/28/2020    MCV 87.9 02/28/2020     02/28/2020       No results found for: BNP    Lab Results   Component Value Date    CREATININE 1.6 (H) 02/29/2020    BUN 47 (H) 02/29/2020     02/29/2020    K 4.1 02/29/2020    CL 98 (L) 02/29/2020    CO2 24 02/29/2020     I reviewed the above labs and images    COPD Assessment Test    1. I never cough vs I cough all the time:  1  2. I have no phlegm vs I am completely full of phlegm. 5  3. My chest does not feel tight vs my chest feel vs tight. 2  4. Not breathless with inclines vs breathless with inclines. 5  5. Not limited with ADLs vs Very limited with ADLs. 5  6. Confidence leaving home vs no confidence. 4  7. I sleep soundly vs I don't sleep soundly. 5  8. I have lots of energy vs I have no energy. 1    TOTAL POINTS:  28    Scoring:    A) >30. Very high impact on quality of life. Optimize therapy including rehab  B) >20. High impact on quality of life. C) 10-20. Medium impact on qualify of life  D) <10. Low impact on life  E)  5.  Upper limit of normal in health non-smoker    Assessment/Plan:  1. COPD, severe ([address]  On nebs now:  Brovana/pulmicort and albuterol.   Needs to do pulmonary rehab but won't. I think this would help. The metoprolol could be affecting her by inducing bronchospasms but she would clearance from Cardiology if she can come off of it. 2. Centrilobular emphysema ([address]  Noted on CT    She needs to be more compliant with taking nebs scheduled.   Probably would benefit a lot from pulmonary rehab    Cleared for ablation     Pulmonary Rehab:  Politely declined    Lung Cancer Screening CT:  2/2020    Follow up in 3-4 months

## 2020-08-10 NOTE — PROGRESS NOTES
Subjective:      Patient ID: Markie Taylor is a 77 y.o. female. Patient presents with:  Check-Up    She is well   No c/o  No fever no cold sx  She could not tolerate the crestor due to her muscle cramps    Her other meds are the same. YOB: 1945    Date of Visit:  8/10/2020     -- Morphine -- Rash    --  rash   -- Other -- Rash    Current Outpatient Medications:  isosorbide mononitrate (IMDUR) 30 MG extended release tablet, TAKE ONE TABLET BY MOUTH DAILY, Disp: 90 tablet, Rfl: 5  rivaroxaban (XARELTO) 20 MG TABS tablet, TAKE ONE TABLET BY MOUTH DAILY WITH BREAKFAST, Disp: 30 tablet, Rfl: 5  albuterol sulfate HFA (PROAIR HFA) 108 (90 Base) MCG/ACT inhaler, Inhale 2 puffs into the lungs every 4 hours as needed for Wheezing or Shortness of Breath, Disp: 1 Inhaler, Rfl: 11  busPIRone (BUSPAR) 5 MG tablet, Take 1 tablet by mouth 2 times daily, Disp: 60 tablet, Rfl: 1  amiodarone (CORDARONE) 200 MG tablet, Take 1 tablet by mouth daily, Disp: 30 tablet, Rfl: 3  Arformoterol Tartrate (BROVANA) 15 MCG/2ML NEBU, Take 2 mLs by nebulization 2 times daily, Disp: 120 mL, Rfl: 3  hydrALAZINE (APRESOLINE) 50 MG tablet, Take 1 tablet by mouth every 8 hours, Disp: 90 tablet, Rfl: 3  NIFEdipine (ADALAT CC) 30 MG extended release tablet, Take 1 tablet by mouth daily, Disp: 30 tablet, Rfl: 3  torsemide (DEMADEX) 20 MG tablet, Take 1 tablet by mouth daily, Disp: 30 tablet, Rfl: 3  clonazePAM (KLONOPIN) 0.5 MG tablet, Take 1 tablet by mouth nightly as needed (sleep) for up to 5 days. , Disp: 5 tablet, Rfl: 0  albuterol (PROVENTIL) (2.5 MG/3ML) 0.083% nebulizer solution, Take 3 mLs by nebulization every 4 hours as needed for Wheezing, Disp: 120 mL, Rfl: 5  metoprolol succinate (TOPROL XL) 50 MG extended release tablet, TAKE ONE TABLET BY MOUTH DAILY, Disp: 90 tablet, Rfl: 3  budesonide (PULMICORT) 0.5 MG/2ML nebulizer suspension, Take 2 mLs by nebulization 2 times daily, Disp: 360 mL, Rfl: 3  formoterol (PERFOROMIST) Physical Exam  Constitutional:       General: She is not in acute distress. Appearance: Normal appearance. She is well-developed. She is not ill-appearing or diaphoretic. Eyes:      General: No scleral icterus. Neck:      Musculoskeletal: Neck supple. Thyroid: No thyroid mass or thyromegaly. Cardiovascular:      Rate and Rhythm: Normal rate and regular rhythm. Heart sounds: Normal heart sounds. No murmur. No friction rub. No gallop. Pulmonary:      Effort: Pulmonary effort is normal. No tachypnea, accessory muscle usage or respiratory distress. Breath sounds: Normal breath sounds. No decreased breath sounds, wheezing, rhonchi or rales. Lymphadenopathy:      Cervical: No cervical adenopathy. Upper Body:      Right upper body: No supraclavicular adenopathy. Left upper body: No supraclavicular adenopathy. Skin:     General: Skin is warm and dry. Coloration: Skin is not pale. Neurological:      General: No focal deficit present. Mental Status: She is alert. Assessment:        Diagnosis Orders   1. Essential hypertension     2.  Screening mammogram, encounter for  HILARIO DIGITAL SCREEN W OR WO CAD BILATERAL       Stable   She does not use the cpap machine as she could not tolerate       Plan:      Do get a mammogram later this year for a check  continue the medicines  See in 5 months        Rowan Odom MD

## 2020-08-12 ENCOUNTER — TELEPHONE (OUTPATIENT)
Dept: CARDIOLOGY CLINIC | Age: 75
End: 2020-08-12

## 2020-08-12 RX ORDER — EVOLOCUMAB 140 MG/ML
140 INJECTION, SOLUTION SUBCUTANEOUS
COMMUNITY
End: 2020-08-12 | Stop reason: SDUPTHER

## 2020-08-12 RX ORDER — EVOLOCUMAB 140 MG/ML
1 INJECTION, SOLUTION SUBCUTANEOUS
Qty: 6 PEN | Refills: 6 | Status: SHIPPED
Start: 2020-08-12 | End: 2020-08-13

## 2020-08-12 NOTE — TELEPHONE ENCOUNTER
Did PA online thru [employer] for [address] [place] [address]. PA valid thru 2/12/2021. Patient informed.

## 2020-08-12 NOTE — TELEPHONE ENCOUNTER
Rec'd fax confirmation from [employer] valid thru 02/12/21. Called patient to make aware. She requested it sent to [facility].

## 2020-08-12 NOTE — TELEPHONE ENCOUNTER
Patient returned call:    Annie Butt  She said said that she is in the donut hole and she can get it thru her mail order. She states that she had leg cramping with Crestor and Lipitor. Insurance ID [id number]  Group: [id number]   Issue [id number]  Pharmacy provider line: [phone]    Patient states that she does not know the mail order pharmacy name and it is not on her card.

## 2020-08-13 ENCOUNTER — TELEPHONE (OUTPATIENT)
Dept: CARDIOLOGY CLINIC | Age: 75
End: 2020-08-13

## 2020-08-13 RX ORDER — ROSUVASTATIN CALCIUM 20 MG/1
20 TABLET, COATED ORAL DAILY
Qty: 30 TABLET | Refills: 11 | Status: SHIPPED | OUTPATIENT
Start: 2020-08-13 | End: 2021-09-29

## 2020-08-13 RX ORDER — ROSUVASTATIN CALCIUM 20 MG/1
20 TABLET, COATED ORAL DAILY
COMMUNITY
End: 2020-08-13 | Stop reason: SDUPTHER

## 2020-08-13 NOTE — TELEPHONE ENCOUNTER
Patient can not afford Angelo Aase with Approved PA and does not qualify for patient assistance. She wants to try Crestor again. Restart Crestor 20 mg daily, per Jessica Ramirez.

## 2020-08-13 NOTE — TELEPHONE ENCOUNTER
Patient called office today stating that she does not want to take [address]. She said even with prior auth approval from [employer] it will cost her $300.00. She said that she can not afford this. I called [phone] ([phone] about financial aid for patient. I spoke to Radha who stated that she can not make outbound calls to patients. She requested that patient call back the same phone number and choose option 3 for patient assistance. I called patient back and she said that she was on the telephone and will call me back. I did inform her that my call was about financial assistance.

## 2020-08-13 NOTE — TELEPHONE ENCOUNTER
She can resume taking Crestor 20 mg daily. If she is able to tolerate this dose we can discuss increasing to 40 mg daily if needed. She will need to complete a fasting lipid in 2-3 months after starting Crestor.

## 2020-08-13 NOTE — TELEPHONE ENCOUNTER
Patient called back and said that she is not eligible for financial income because together they make over [phone] 000.00  She said that she is willing to try crestor again.

## 2020-08-13 NOTE — TELEPHONE ENCOUNTER
Relayed Rere RN's message from previous encounter. Patient verbalized and confirmed understanding. Note from [employer] RN   8/13/20   11:11 AM     She can resume taking Crestor 20 mg daily. If she is able to tolerate this dose we can discuss increasing to 40 mg daily if needed. She will need to complete a fasting lipid in 2-3 months after starting Crestor. Placed order Epic for fasting lab work. Will pend Rx to Dr. Galileo Lao covering for Dr. Cinthia Waller.

## 2020-09-14 ENCOUNTER — OFFICE VISIT (OUTPATIENT)
Dept: CARDIOLOGY CLINIC | Age: 75
End: 2020-09-14
Payer: MEDICARE

## 2020-09-14 VITALS
WEIGHT: 219 LBS | HEART RATE: 57 BPM | HEIGHT: 64 IN | OXYGEN SATURATION: 98 % | SYSTOLIC BLOOD PRESSURE: 180 MMHG | TEMPERATURE: 96.9 F | DIASTOLIC BLOOD PRESSURE: 80 MMHG | BODY MASS INDEX: 37.39 KG/M2

## 2020-09-14 DIAGNOSIS — I48.0 PAROXYSMAL ATRIAL FIBRILLATION (HCC): ICD-10-CM

## 2020-09-14 LAB
ALBUMIN SERPL-MCNC: 4.5 G/DL (ref 3.4–5)
ANION GAP SERPL CALCULATED.3IONS-SCNC: 11 MMOL/L (ref 3–16)
BUN BLDV-MCNC: 28 MG/DL (ref 7–20)
CALCIUM SERPL-MCNC: 9.4 MG/DL (ref 8.3–10.6)
CHLORIDE BLD-SCNC: 102 MMOL/L (ref 99–110)
CO2: 30 MMOL/L (ref 21–32)
CREAT SERPL-MCNC: 1.5 MG/DL (ref 0.6–1.2)
GFR AFRICAN AMERICAN: 41
GFR NON-AFRICAN AMERICAN: 34
GLUCOSE BLD-MCNC: 98 MG/DL (ref 70–99)
PHOSPHORUS: 4.4 MG/DL (ref 2.5–4.9)
POTASSIUM SERPL-SCNC: 4.5 MMOL/L (ref 3.5–5.1)
SODIUM BLD-SCNC: 143 MMOL/L (ref 136–145)

## 2020-09-14 PROCEDURE — 1090F PRES/ABSN URINE INCON ASSESS: CPT | Performed by: INTERNAL MEDICINE

## 2020-09-14 PROCEDURE — 4040F PNEUMOC VAC/ADMIN/RCVD: CPT | Performed by: INTERNAL MEDICINE

## 2020-09-14 PROCEDURE — G8427 DOCREV CUR MEDS BY ELIG CLIN: HCPCS | Performed by: INTERNAL MEDICINE

## 2020-09-14 PROCEDURE — 1123F ACP DISCUSS/DSCN MKR DOCD: CPT | Performed by: INTERNAL MEDICINE

## 2020-09-14 PROCEDURE — G8926 SPIRO NO PERF OR DOC: HCPCS | Performed by: INTERNAL MEDICINE

## 2020-09-14 PROCEDURE — G8399 PT W/DXA RESULTS DOCUMENT: HCPCS | Performed by: INTERNAL MEDICINE

## 2020-09-14 PROCEDURE — 1036F TOBACCO NON-USER: CPT | Performed by: INTERNAL MEDICINE

## 2020-09-14 PROCEDURE — 93000 ELECTROCARDIOGRAM COMPLETE: CPT | Performed by: INTERNAL MEDICINE

## 2020-09-14 PROCEDURE — 3017F COLORECTAL CA SCREEN DOC REV: CPT | Performed by: INTERNAL MEDICINE

## 2020-09-14 PROCEDURE — G8417 CALC BMI ABV UP PARAM F/U: HCPCS | Performed by: INTERNAL MEDICINE

## 2020-09-14 PROCEDURE — 99205 OFFICE O/P NEW HI 60 MIN: CPT | Performed by: INTERNAL MEDICINE

## 2020-09-14 PROCEDURE — 3023F SPIROM DOC REV: CPT | Performed by: INTERNAL MEDICINE

## 2020-09-14 RX ORDER — CARVEDILOL 12.5 MG/1
12.5 TABLET ORAL 2 TIMES DAILY
Qty: 30 TABLET | Refills: 5 | Status: SHIPPED
Start: 2020-09-14 | End: 2020-09-16 | Stop reason: ALTCHOICE

## 2020-09-14 NOTE — PROGRESS NOTES
Allergies: Allergies   Allergen Reactions    Morphine Rash     rash    Other Rash       Medication:   Prior to Admission medications    Medication Sig Start Date End Date Taking?  Authorizing Provider   rosuvastatin (CRESTOR) 20 MG tablet Take 1 tablet by mouth daily 8/13/20  Yes Alyson Sanchez MD   isosorbide mononitrate (IMDUR) 30 MG extended release tablet TAKE ONE TABLET BY MOUTH DAILY 7/27/20  Yes Sekou Campbell MD   rivaroxaban (XARELTO) 20 MG TABS tablet TAKE ONE TABLET BY MOUTH DAILY WITH BREAKFAST 7/23/20  Yes Toi Salas DO   albuterol sulfate HFA (PROAIR HFA) 108 (90 Base) MCG/ACT inhaler Inhale 2 puffs into the lungs every 4 hours as needed for Wheezing or Shortness of Breath 7/23/20  Yes Toi Salas DO   busPIRone (BUSPAR) 5 MG tablet Take 1 tablet by mouth 2 times daily 3/13/20  Yes Miguel Lawson MD   amiodarone (CORDARONE) 200 MG tablet Take 1 tablet by mouth daily 3/4/20  Yes Tanya Kan MD   Arformoterol Tartrate (BROVANA) 15 MCG/2ML NEBU Take 2 mLs by nebulization 2 times daily 2/29/20  Yes Jada Kan MD   hydrALAZINE (APRESOLINE) 50 MG tablet Take 1 tablet by mouth every 8 hours 2/29/20  Yes Jada Kan MD   NIFEdipine (ADALAT CC) 30 MG extended release tablet Take 1 tablet by mouth daily 2/29/20  Yes Jada Kan MD   torsemide (DEMADEX) 20 MG tablet Take 1 tablet by mouth daily 3/1/20  Yes Jada Kan MD   albuterol (PROVENTIL) (2.5 MG/3ML) 0.083% nebulizer solution Take 3 mLs by nebulization every 4 hours as needed for Wheezing 2/17/20  Yes Toi Salas DO   metoprolol succinate (TOPROL XL) 50 MG extended release tablet TAKE ONE TABLET BY MOUTH DAILY 11/27/19  Yes CHRISTINE Glez CNP   budesonide (PULMICORT) 0.5 MG/2ML nebulizer suspension Take 2 mLs by nebulization 2 times daily 7/24/19  Yes Toi Salas,    formoterol (PERFOROMIST) 20 MCG/2ML nebulizer solution Take 2 mLs by nebulization 2 times daily 7/24/19  Yes Jackelyn Ruiz,    nitroGLYCERIN (NITROSTAT) 0.4 MG SL tablet Place 1 tablet under the tongue every 5 minutes as needed for Chest pain 3/5/19  Yes Hakeem Helm MD   Multiple Vitamins-Minerals (MULTI FOR HER 50+ PO) Take 1 tablet by mouth daily   Yes Historical Provider, MD   clonazePAM (KLONOPIN) 0.5 MG tablet Take 1 tablet by mouth nightly as needed (sleep) for up to 5 days. 2/20/20 8/10/20  Hakeem Helm MD       Social History:   reports that she quit smoking about 6 years ago. Her smoking use included cigarettes. She started smoking about 43 years ago. She has a 37.00 pack-year smoking history. She has never used smokeless tobacco. She reports that she does not drink alcohol or use drugs. Family History:  family history includes Heart Disease in her father; Hypertension in an other family member. Reviewed. Denies family history of sudden cardiac death, arrhythmia, premature CAD    Review of System:    · General ROS: negative for - chills, fever   · Psychological ROS: negative for - anxiety or depression  · Ophthalmic ROS: negative for - eye pain or loss of vision  · ENT ROS: negative for - epistaxis, headaches, nasal discharge, sore throat   · Allergy and Immunology ROS: negative for - hives, nasal congestion   · Hematological and Lymphatic ROS: negative for - bleeding problems, blood clots, bruising or jaundice  · Endocrine ROS: negative for - skin changes, temperature intolerance or unexpected weight changes  · Respiratory ROS: negative for - cough, hemoptysis, pleuritic pain, sputum changes or wheezing. + SOB  · Cardiovascular ROS: Per HPI.    · Gastrointestinal ROS: negative for - abdominal pain, blood in stools, diarrhea, hematemesis, melena, nausea/vomiting or swallowing difficulty/pain  · Genito-Urinary ROS: negative for - dysuria or incontinence  · Musculoskeletal ROS: negative for - joint swelling or muscle pain  · Neurological ROS: negative for - confusion, dizziness, gait disturbance, headaches, numbness/tingling, seizures, speech problems, tremors, visual changes or weakness  · Dermatological ROS: negative for - rash    Physical Examination:  Vitals:    20 1024   BP: (!) 180/80   Pulse: 57   Temp:    SpO2: 98%       · Constitutional: Oriented. No distress. · Head: Normocephalic and atraumatic. · Mouth/Throat: Oropharynx is clear and moist.   · Eyes: Conjunctivae normal. EOM are normal.   · Neck: Normal range of motion. Neck supple. No rigidity. No JVD present. · Cardiovascular: Normal rate, regular rhythm, S1&S2 and intact distal pulses. · Pulmonary/Chest: Bilateral respiratory sounds. No wheezes. No rhonchi. · Abdominal: Soft. Bowel sounds present. No distension, No tenderness. · Musculoskeletal: No tenderness. No edema    · Lymphadenopathy: Has no cervical adenopathy. · Neurological: Alert and oriented. Cranial nerve appears intact, No Gross deficit   · Skin: Skin is warm and dry. No rash noted. · Psychiatric: Has a normal mood, affect and behavior     Labs:  Reviewed. EC2020 reviewed, sinus rhythm, occasional ectopic ventricular beat  with v-rate of 64 bpm with QRS duration 94 ms. No pathologic Q waves, ventricular pre-excitation, or QT prolongation. Studies:   1. Event monitor:       2. Echo: 20  There is moderate concentric left ventricular hypertrophy. Patient appears to be in atrial fibrillation. Ejection fraction is estimated to be 50-60%. Indeterminate diastolic function. Mild aortic regurgitation. Mild tricuspid regurgitation. Mild mitral regurgitation with mitral annular calcification    3. [facility] 18   Summary    Abnormal study. There is a moderate sized, mild intensity, partially    reversible defect of the mid to apical anterior and mid anterolateral walls    which is consistent with ischemia.  There is breast attenuation but stress    images appear worse.    Normal LV size and systolic function.    Overall findings represent an intermediate risk study       4. Cath: 3/5/18  OVERALL IMPRESSION  1.  Again, 100% proximal right coronary artery occlusion with left to right collaterals. 2.  Mild disease of the distal left main trunk. 3.  20% to 30% ostial left anterior descending artery stenosis with collaterals from LAD to the right coronary artery. 4.  30% to 40% stenosis of the proximal circumflex artery. 5.  Normal left ventricular systolic function with estimated EF of 55% to 60%. 6.  Slightly enlarged aortic root with no evidence of aortic stenosis or regurgitation.     In view of the above findings, we will okay the patient for her upcoming  aortic aneurysm surgery from cardiac standpoint. [facility]: ([facility]) 4/2017   of RCA chronic LAD 10-20% RA 4 PA24/9 16 wedge 9 LVEDP markedly elevated 30     Right Heart Cath 2/28/2020  1.  _____ normal right cardiac pressure. 2.  Elevated pulmonary capillary wedge pressure with a mean pulmonary  capillary wedge of 29 mmHg. 3.  Normal cardiac output and indices. 4.  No oxygen step off to suggest ASD/PFO. 6. Carotid US 10/2017 at [facility] 26:  1. Estimated diameter reduction of the right internal carotid artery is  less than 50%. 2.  Estimated diameter reduction of the left internal carotid artery is  50-69%. 3.  The bilateral common carotid arteries reveal no evidence of   significant stenosis. 4.  There is greater than 50% stenosis of the right external carotid  artery. 5.  There is no evidence of significant stenosis in the left external  carotid artery. 6.  The bilateral vertebral arteries are patent with antegrade flow. 7.  The bilateral subclavian arteries reveal no evidence of significant  stenosis. 8.  There has been no significant change from the previous study of  4/21/2017.     The MCOT, echocardiogram, stress test, and coronary angiography/PCI were reviewed by myself and used for my plan of care. Procedures:  1.  Endovascular AAA repair on 3/21/18 by  Jeannie Vipin    Assessment/Plan:     Atrial fibrillation  She has a [id number] Score 3 (HTN, AGE, female gender)  On Xarelto 20 mg daily for  thromboembolic risk reduction. Tolerating well no signs or symptoms of abnormal bruising or bleeding. Stop Amiodarone today. We educated the patient that atrial fibrillation is a progressive disease, with more frequent episodes that will ensue. Subsequent episodes usually become more sustained to the extent that many individuals would then develop persistent atrial fibrillation. Once persistence is reached, permanent atrial fibrillation is inevitable. We discussed different management options for atrial fibrillation including their risks and benefits. These options include use of cardioversion (mainly for persisting atrial fibrillation or atrial flutter) which provides an effective immediate therapy with success rates of 75% or higher, but it provides no short nor long term efficacy. Anti-arrhythmic medications provide a very effective short term therapy, but even with our most potent anti-arrhythmic medication there is limited long term efficacy (clinical studies have shown that 40% of patients remain atrial fibrillation-free after 4 years of follow-up after starting one of the more powerful anti-arrhythmic medication (amiodarone), and, if extrapolated, may have further diminishing success as time goes on). Atrial fibrillation ablation is a potentially curative therapy with very reasonable success rate after a first time procedure and with improving success rates with subsequent procedures. The risks, benefits and alternatives of the atrial fibrillation ablation procedure were discussed with the patient.  The risks including, but not limited to, bleeding, infection, radiation exposure, injury to vascular, cardiac and surrounding structures (including pneumothorax), stroke, cardiac perforation, tamponade, need for emergent open heart surgery, need for pacemaker today.  Nifedipine 30 mg daily. Hydralazine 50 mg q8h. Encouraged Low sodium diet. Stop Toprol XL today start Carvedilol 12.5 mg BID for added BP control. AAA (abdominal aortic aneurysm) without rupture   Underwent endovascular AAA repair on 3/21/18 by Dr. Joseph Dixon. Follows vascular surgery    Severe COPD   Chronic shortness of breath. Centrilobular emphysema noted on CT scan. Managed by Dr. Lidya Narayanan MD.    Follow up three months after ablation with Brandon Denise CNP. Continue routine follow up with Dr. Shira Olsen. Thank you for allowing me to participate in the care of Breanne Bhakta. All questions and concerns were addressed to the patient/family. Alternatives to my treatment were discussed. This note was scribed in the presence of Dr. Zayra Morrison MD by David Child RN. The scribe's documentation has been prepared under my direction and personally reviewed by me in its entirety. I confirm that the note above accurately reflects all work, physical examination, the discussion of treatments and procedures, and medical decision making performed by me.     Zayra Morrison MD, MS, [facility], [facility]  Cardiac Electrophysiology  [address]  [address] [facility], [address] [place] [phone]  [phone]

## 2020-09-14 NOTE — PATIENT INSTRUCTIONS
8. Cath lab will provide you with all post procedure instructions      You will need to complete Pre-Procedure lab work prior to your completing your COVID test   The address for your lab work is:   [address] [place], [place] [address]Jon medel [facility]  Phone: [phone]  The hours are Mon -Fri.  8:00 am - 5:00 pm     No appointment necessary    Please allow for at least 30-45 mins for your lab work to be completed. ______________________________________________________________  Your COVID 19 test is scheduled for 10/1/2020 Thursday 11:15 am.  The address for the COVID test is [address] [place] [address]  The testing site is located at the [facility]  There will be a sign that says PRE-OP TESTING  Follow the neon cones with arrows to the testing tent. Patient Education        Atrial Fibrillation: Care Instructions  Your Care Instructions     Atrial fibrillation is an irregular and often fast heartbeat. Treating this condition is important for several reasons. It can cause blood clots, which can travel from your heart to your brain and cause a stroke. If you have a fast heartbeat, you may feel lightheaded, dizzy, and weak. An irregular heartbeat can also increase your risk for heart failure. Atrial fibrillation is often the result of another heart condition, such as high blood pressure or coronary artery disease. Making changes to improve your heart condition will help you stay healthy and active. Follow-up care is a key part of your treatment and safety. Be sure to make and go to all appointments, and call your doctor if you are having problems. It's also a good idea to know your test results and keep a list of the medicines you take. How can you care for yourself at home? Medicines  · Take your medicines exactly as prescribed. Call your doctor if you think you are having a problem with your medicine.  You will get more details on the specific medicines your doctor prescribes. · If your doctor has given you a blood thinner to prevent a stroke, be sure you get instructions about how to take your medicine safely. Blood thinners can cause serious bleeding problems. · Do not take any vitamins, over-the-counter drugs, or herbal products without talking to your doctor first.  Lifestyle changes  · Do not smoke. Smoking can increase your chance of a stroke and heart attack. If you need help quitting, talk to your doctor about stop-smoking programs and medicines. These can increase your chances of quitting for good. · Eat a heart-healthy diet. · Stay at a healthy weight. Lose weight if you need to. · Limit alcohol to 2 drinks a day for men and 1 drink a day for women. Too much alcohol can cause health problems. · Avoid colds and flu. Get a pneumococcal vaccine shot. If you have had one before, ask your doctor whether you need another dose. Get a flu shot every year. If you must be around people with colds or flu, wash your hands often. Activity  · If your doctor recommends it, get more exercise. Walking is a good choice. Bit by bit, increase the amount you walk every day. Try for at least 30 minutes on most days of the week. You also may want to swim, bike, or do other activities. Your doctor may suggest that you join a cardiac rehabilitation program so that you can have help increasing your physical activity safely. · Start light exercise if your doctor says it is okay. Even a small amount will help you get stronger, have more energy, and manage stress. Walking is an easy way to get exercise. Start out by walking a little more than you did in the hospital. Gradually increase the amount you walk. · When you exercise, watch for signs that your heart is working too hard. You are pushing too hard if you cannot talk while you are exercising. If you become short of breath or dizzy or have chest pain, sit down and rest immediately. · Check your pulse regularly.  Place two fingers on the artery at the palm side of your wrist, in line with your thumb. If your heartbeat seems uneven or fast, talk to your doctor. When should you call for help? [id number] anytime you think you may need emergency care. For example, call if:  · You have symptoms of a heart attack. These may include:  ? Chest pain or pressure, or a strange feeling in the chest.  ? Sweating. ? Shortness of breath. ? Nausea or vomiting. ? Pain, pressure, or a strange feeling in the back, neck, jaw, or upper belly or in one or both shoulders or arms. ? Lightheadedness or sudden weakness. ? A fast or irregular heartbeat. After you call 911, the  may tell you to chew 1 adult-strength or 2 to 4 low-dose aspirin. Wait for an ambulance. Do not try to drive yourself. · You have symptoms of a stroke. These may include:  ? Sudden numbness, tingling, weakness, or loss of movement in your face, arm, or leg, especially on only one side of your body. ? Sudden vision changes. ? Sudden trouble speaking. ? Sudden confusion or trouble understanding simple statements. ? Sudden problems with walking or balance. ? A sudden, severe headache that is different from past headaches. · You passed out (lost consciousness). Call your doctor now or seek immediate medical care if:  · You have new or increased shortness of breath. · You feel dizzy or lightheaded, or you feel like you may faint. · Your heart rate becomes irregular. · You can feel your heart flutter in your chest or skip heartbeats. Tell your doctor if these symptoms are new or worse. Watch closely for changes in your health, and be sure to contact your doctor if you have any problems. Where can you learn more? Go to https://MedClimate.Razz. org and sign in to your [employer] account. Enter U020 in the WayConnected box to learn more about \"Atrial Fibrillation: Care Instructions. \"     If you do not have an account, please click on the \"Sign Up pulse. Your pulse may seem uneven or fast.  What can you expect when you have atrial fibrillation? At first, spells of atrial fibrillation may come on suddenly and last a short time. It may go away on its own or it goes away after treatment. This is called paroxysmal atrial fibrillation. Over time, the spells may last longer and occur more often. They often don't go away on their own. How is it treated? Treatments can help you feel better and prevent future problems, especially stroke and heart failure. The main types of treatment slow the heart rate, control the heart rhythm, and help prevent stroke. Your treatment will depend on the cause of your atrial fibrillation, your symptoms, and your risk for stroke. · Heart rate treatment. Medicine may be used to slow your heart rate. Your heartbeat may still be irregular. But these medicines keep your heart from beating too fast. They may also help relieve your symptoms. · Heart rhythm treatment. Different treatments may be used to try to stop atrial fibrillation and keep it from returning. They can also relieve symptoms. These treatments include medicine, electrical cardioversion to shock the heart back to a normal rhythm, a procedure called catheter ablation, and heart surgery. · Stroke prevention. You and your doctor can decide how to lower your risk. You may decide to take a blood-thinning medicine called an anticoagulant. How can you live well with it? You can live well and help manage atrial fibrillation by having a heart-healthy lifestyle. This lifestyle may help reduce how often you have episodes of atrial fibrillation. If you are overweight, losing weight can help relieve symptoms. To have a heart-healthy lifestyle:  · Don't smoke. · Eat heart-healthy foods. · Be active. Talk to your doctor about what type and level of exercise is safe for you. · Stay at a healthy weight. Lose weight if you need to.   · Avoid alcohol if it triggers symptoms. · Manage other health problems such as high blood pressure, high cholesterol, and diabetes. · Avoid getting sick from the flu. Get a flu shot every year. · Manage stress. Where can you learn more? Go to https://Electronic Braillerlourdes.Actacell. org and sign in to your [employer] account. Enter E701 in the [facility] box to learn more about \"Learning About Atrial Fibrillation. \"     If you do not have an account, please click on the \"Sign Up Now\" link. Current as of: December 16, 2019               Content Version: 12.5  © 8767-3583 [employer]QO. Care instructions adapted under license by [facility] ([facility]). If you have questions about a medical condition or this instruction, always ask your healthcare professional. [address] any warranty or liability for your use of this information. Patient Education        Learning About Catheter Ablation for Heart Rhythm Problems  What is catheter ablation? Catheter ablation is a procedure that treats heart rhythm problems. These problems include atrial fibrillation, supraventricular tachycardia (SVT), atrial flutter, and ventricular tachycardia. Your heart should have a strong, steady beat. That beat is controlled by the heart's electrical system. Sometimes that system misfires. This causes a heartbeat that is too fast and isn't steady. Catheter ablation is a way to get into your heart and fix the problem. Ablation is not surgery. How is catheter ablation done? Your doctor inserts thin tubes called catheters into a blood vessel in your groin, arm, or neck. Then your doctor feeds them into the heart. Wires in the catheters help the doctor find the problem areas. Then he or she uses the wires to send energy to destroy the tiny areas of heart tissue that are causing the problems. It may seem like a bad idea to destroy parts of your heart on purpose. But the areas that are destroyed are very tiny.  They should not affect your heart's ability to do its job. You may be awake during the procedure. Or you may be asleep. The doctor will give you medicines to help you feel relaxed and to numb the areas where the catheters go in. You may feel a little uncomfortable, but you should not feel pain. This procedure usually takes 2 to 6 hours. In rare cases, it can take longer. You may stay overnight in the hospital. How long you stay in the hospital depends on the type of ablation you have. What can you expect after catheter ablation? Do not exercise hard or lift anything heavy for a week. You will probably be able to go back to work and to your normal routine in 1 or 2 days. You may have swelling, bruising, or a small lump around the site where the catheters went into your body. These should go away in 3 to 4 weeks. You may have to take some medicines for a while. Follow-up care is a key part of your treatment and safety. Be sure to make and go to all appointments, and call your doctor if you are having problems. It's also a good idea to know your test results and keep a list of the medicines you take. Where can you learn more? Go to https://Stratatech Corporation.Kalyan Jewellers. org and sign in to your [employer] account. Enter B021 in the [facility] box to learn more about \"Learning About Catheter Ablation for Heart Rhythm Problems. \"     If you do not have an account, please click on the \"Sign Up Now\" link. Current as of: December 16, 2019               Content Version: 12.5  © 2985-9710 Healthwise, Incorporated. Care instructions adapted under license by [facility] ([facility]). If you have questions about a medical condition or this instruction, always ask your healthcare professional. [place] [address] any warranty or liability for your use of this information.          Patient Education        Electrophysiology Study and Catheter Ablation: Before Your Procedure  What is an electrophysiology study and catheter ablation? An electrophysiology study is a test to see if there is a problem with your heart rhythm and to find out how to fix it. It is also called an EP study. A catheter ablation procedure is sometimes done at the same time. This procedure destroys (ablates) small areas of your heart that are causing your heart rhythm problem. The doctor puts plastic tubes called catheters into blood vessels in your groin, arm, or neck. He or she then uses an X-ray machine to guide long wires through the tubes to your heart. The doctor can use these wires to record your heart's electrical signals. If the doctor thinks your problem can be fixed with ablation, he or she can use the wires to destroy a small part of your heart tissue. This is most often done with radio waves. You will probably be awake during the procedure. But you might be asleep. The doctor will give you medicines to help you feel relaxed and to numb the areas where the catheters go in. An EP study and ablation can take 2 to 6 hours. In rare cases, it can take longer. If you have an EP study only and you don't need more treatment, you may go home the same day. But if you also have ablation, you may stay overnight in the hospital. How long you stay in the hospital depends on the type of ablation you have. Do not exercise hard or lift anything heavy for a week. You may be able to go back to work and to your normal routine in 1 or 2 days. Follow-up care is a key part of your treatment and safety. Be sure to make and go to all appointments, and call your doctor if you are having problems. It's also a good idea to know your test results and keep a list of the medicines you take. How do you prepare for the procedure? Procedures can be stressful. This information will help you understand what you can expect. And it will help you safely prepare for your procedure. Preparing for the procedure  · Be sure you have someone to take you home.  Anesthesia and pain medicine will make it unsafe for you to drive or get home on your own. · Understand exactly what procedure is planned, along with the risks, benefits, and other options. · Tell your doctor ALL the medicines, vitamins, supplements, and herbal remedies you take. Some may increase the risk of problems during your procedure. Your doctor will tell you if you should stop taking any of them before the procedure and how soon to do it. · If you take aspirin or some other blood thinner, ask your doctor if you should stop taking it before your procedure. Make sure that you understand exactly what your doctor wants you to do. These medicines increase the risk of bleeding. · Make sure your doctor and the hospital have a copy of your advance directive. If you don't have one, you may want to prepare one. It lets others know your health care wishes. It's a good thing to have before any type of surgery or procedure. What happens on the day of the procedure? · Follow the instructions exactly about when to stop eating and drinking. If you don't, your procedure may be canceled. If your doctor told you to take your medicines on the day of the procedure, take them with only a sip of water. · Take a bath or shower before you come in for your procedure. Do not apply lotions, perfumes, deodorants, or nail polish. · Take off all jewelry and piercings. And take out contact lenses, if you wear them. At the hospital or surgery center    · Bring a picture ID. · You will be kept comfortable and safe by your anesthesia provider. The anesthesia may make you sleep. Or it may just numb the area being worked on. · This procedure can take 2 to 6 hours. In rare cases, it can take longer. · After the procedure, pressure will be applied to the area where the catheter was put in your blood vessel. Then the area may be covered with a bandage or a compression device. This will prevent bleeding.   · Nurses will check your heart rate and blood pressure. The nurse will also check the catheter site for bleeding. · If the catheter was put in your groin, you will need to lie still and keep your leg straight for several hours. The nurse may put a weighted bag on your leg to keep it still. · If the catheter was put in your arm, you may be able to sit up and get out of bed right away. But you will need to keep your arm still for at least 1 hour. · You may have a bruise or a small lump where the catheter was put in your blood vessel. This is normal and will go away. When should you call your doctor? · You have questions or concerns. · You don't understand how to prepare for your procedure. · You become ill before the procedure (such as fever, flu, or a cold). · You need to reschedule or have changed your mind about having the procedure. Where can you learn more? Go to https://Zivix.Genetic Technologies [email or web address] org and sign in to your [email or web address] account. Enter L586 in the [employer] box to learn more about \"Electrophysiology Study and Catheter Ablation: Before Your Procedure. \"     If you do not have an account, please click on the \"Sign Up Now\" link. Current as of: December 16, 2019               Content Version: 12.5  © 7956-4012 Healthwise, Incorporated. Care instructions adapted under license by [facility] ([facility]). If you have questions about a medical condition or this instruction, always ask your healthcare professional. [place] [address] any warranty or liability for your use of this information. Patient Education        Electrophysiology Study and Catheter Ablation: What to Expect at [address] [place] [address] had an electrophysiology study for a problem with your heartbeat. You may also have had a catheter ablation to try to correct the problem. You may have swelling, bruising, or a small lump around the site where the catheters went into your body. These should go away in 3 to 4 weeks.   Do not exercise hard or lift anything heavy for a week. You may be able to go back to work and to your normal routine in 1 or 2 days. This care sheet gives you a general idea about how long it will take for you to recover. But each person recovers at a different pace. Follow the steps below to get better as quickly as possible. How can you care for yourself at home? Activity  · For 1 week, do not lift anything that would make you strain. This may include heavy grocery bags and milk containers, a heavy briefcase or backpack, cat litter or dog food bags, a vacuum , or a child. · For 1 week, do not exercise hard or do any activity that could strain your blood vessels or the site where the catheters went into your body. · Ask your doctor when it is okay to have sex. Diet  · You can eat your normal diet. If your stomach is upset, try bland, low-fat foods like plain rice, broiled chicken, toast, and yogurt. · Drink plenty of fluids (unless your doctor tells you not to). Medicines  · Your doctor will tell you if and when you can restart your medicines. He or she will also give you instructions about taking any new medicines. · If you take aspirin or some other blood thinner, ask your doctor if and when to start taking it again. Make sure that you understand exactly what your doctor wants you to do. · Ask your doctor if you can take acetaminophen (Tylenol) for pain. Do not take aspirin for 3 days, unless your doctor says it is okay. · Check with your doctor before you take aspirin or anti-inflammatory medicines to reduce pain and swelling. These include ibuprofen (Advil, Motrin) and naproxen (Aleve). · Make sure you know which heart medicines to continue and which ones to stop. Ask your doctor if you aren't sure. Catheter site care  · You can remove your bandages the day after the procedure. · You may shower 24 to 48 hours after the procedure, if your doctor okays it. Pat the incision dry.   · Do not soak the catheter site until it such as:  ? Increased pain, swelling, warmth, or redness. ? Red streaks leading from the catheter site. ? Pus draining from the catheter site. ? A fever. · Your leg, arm, or hand is painful, looks blue, or feels cold, numb, or tingly. Watch closely for any changes in your health, and be sure to contact your doctor if you have any problems. Where can you learn more? Go to https://Blue Spark Technologiespepiceweb.Vicampo. org and sign in to your [employer] account. Enter [phone] in the [employer] box to learn more about \"Electrophysiology Study and Catheter Ablation: What to Expect at Home. \"     If you do not have an account, please click on the \"Sign Up Now\" link. Current as of: December 16, 2019               Content Version: 12.5  © 4916-0626 Healthwise, Incorporated. Care instructions adapted under license by [facility] ([facility]). If you have questions about a medical condition or this instruction, always ask your healthcare professional. [address] any warranty or liability for your use of this information.

## 2020-09-15 ENCOUNTER — HOSPITAL ENCOUNTER (OUTPATIENT)
Dept: CT IMAGING | Age: 75
Discharge: HOME | End: 2020-09-15
Payer: MEDICARE

## 2020-09-15 ENCOUNTER — TELEPHONE (OUTPATIENT)
Dept: FAMILY MEDICINE CLINIC | Age: 75
End: 2020-09-15

## 2020-09-15 LAB
GFR AFRICAN AMERICAN: 48
GFR NON-AFRICAN AMERICAN: 40
PERFORMED ON: ABNORMAL
POC CREATININE: 1.3 MG/DL (ref 0.6–1.2)
POC SAMPLE TYPE: ABNORMAL

## 2020-09-15 PROCEDURE — 6360000004 HC RX CONTRAST MEDICATION: Performed by: INTERNAL MEDICINE

## 2020-09-15 PROCEDURE — 75574 CT ANGIO HRT W/3D IMAGE: CPT

## 2020-09-15 PROCEDURE — 82565 ASSAY OF CREATININE: CPT

## 2020-09-15 RX ADMIN — IOPAMIDOL 75 ML: 755 INJECTION, SOLUTION INTRAVENOUS at 12:39

## 2020-09-16 ENCOUNTER — TELEPHONE (OUTPATIENT)
Dept: CARDIOLOGY CLINIC | Age: 75
End: 2020-09-16

## 2020-09-16 RX ORDER — METOPROLOL SUCCINATE 50 MG/1
50 TABLET, EXTENDED RELEASE ORAL DAILY
Qty: 90 TABLET | Refills: 3
Start: 2020-09-16 | End: 2021-01-11

## 2020-09-16 NOTE — TELEPHONE ENCOUNTER
Called and spoke with patient she states she is more short of breath since she started on the Coreg. Instructed to stop Coreg and restart Metoprolol succinate 50 mg daily. Instructed to monitor and record her BP and HR daily and contact her PCP Dr. Conner Mcintosh to further discuss BP medications and management. Verbalized understanding.

## 2020-09-16 NOTE — TELEPHONE ENCOUNTER
Kaz Avery called in this afternoon wanting to talk to Dr. Hendrick Riedel of his RN. She states that she is having a hard time breathing today and thinks it's caused from the Carvedilol.       You can reach Kaz Avery at #[phone]

## 2020-09-18 ENCOUNTER — OFFICE VISIT (OUTPATIENT)
Dept: FAMILY MEDICINE CLINIC | Age: 75
End: 2020-09-18
Payer: MEDICARE

## 2020-09-18 VITALS
WEIGHT: 220 LBS | DIASTOLIC BLOOD PRESSURE: 88 MMHG | HEIGHT: 64 IN | BODY MASS INDEX: 37.56 KG/M2 | HEART RATE: 60 BPM | SYSTOLIC BLOOD PRESSURE: 160 MMHG | TEMPERATURE: 97.2 F

## 2020-09-18 PROCEDURE — 3017F COLORECTAL CA SCREEN DOC REV: CPT | Performed by: FAMILY MEDICINE

## 2020-09-18 PROCEDURE — G8417 CALC BMI ABV UP PARAM F/U: HCPCS | Performed by: FAMILY MEDICINE

## 2020-09-18 PROCEDURE — 1090F PRES/ABSN URINE INCON ASSESS: CPT | Performed by: FAMILY MEDICINE

## 2020-09-18 PROCEDURE — G8427 DOCREV CUR MEDS BY ELIG CLIN: HCPCS | Performed by: FAMILY MEDICINE

## 2020-09-18 PROCEDURE — 4040F PNEUMOC VAC/ADMIN/RCVD: CPT | Performed by: FAMILY MEDICINE

## 2020-09-18 PROCEDURE — 1123F ACP DISCUSS/DSCN MKR DOCD: CPT | Performed by: FAMILY MEDICINE

## 2020-09-18 PROCEDURE — 1036F TOBACCO NON-USER: CPT | Performed by: FAMILY MEDICINE

## 2020-09-18 PROCEDURE — G8399 PT W/DXA RESULTS DOCUMENT: HCPCS | Performed by: FAMILY MEDICINE

## 2020-09-18 PROCEDURE — 99213 OFFICE O/P EST LOW 20 MIN: CPT | Performed by: FAMILY MEDICINE

## 2020-09-18 RX ORDER — HYDROXYZINE PAMOATE 25 MG/1
25 CAPSULE ORAL 2 TIMES DAILY PRN
Qty: 30 CAPSULE | Refills: 0 | Status: SHIPPED
Start: 2020-09-18 | End: 2020-10-22 | Stop reason: ALTCHOICE

## 2020-09-18 NOTE — PROGRESS NOTES
Subjective:      Patient ID: Darshana Jackson is a 77 y.o. female. Patient presents with:   Follow-up: hypertension per Dr. Jaya Briceno    She is here to check the blood pressure and also she has pbs with sleeping   She has a pb with sob with exertion  No change  She has problems getting to to sleep  C/o of anxiety   She is seeing the cardio  Scheduled for ablation treatment of the Afib  No fever no cold sx no cp    Medicine was changed to coreg but she had ps wiith sob being worse and changed back to toprol     YOB: 1945    Date of Visit:  9/18/2020     -- Morphine -- Rash    --  rash   -- Other -- Rash    Current Outpatient Medications:    metoprolol succinate (TOPROL XL) 50 MG extended release tablet, Take 1 tablet by mouth daily, Disp: 90 tablet, Rfl: 3  rosuvastatin (CRESTOR) 20 MG tablet, Take 1 tablet by mouth daily, Disp: 30 tablet, Rfl: 11  isosorbide mononitrate (IMDUR) 30 MG extended release tablet, TAKE ONE TABLET BY MOUTH DAILY, Disp: 90 tablet, Rfl: 5  rivaroxaban (XARELTO) 20 MG TABS tablet, TAKE ONE TABLET BY MOUTH DAILY WITH BREAKFAST, Disp: 30 tablet, Rfl: 5  albuterol sulfate HFA (PROAIR HFA) 108 (90 Base) MCG/ACT inhaler, Inhale 2 puffs into the lungs every 4 hours as needed for Wheezing or Shortness of Breath, Disp: 1 Inhaler, Rfl: 11  busPIRone (BUSPAR) 5 MG tablet, Take 1 tablet by mouth 2 times daily, Disp: 60 tablet, Rfl: 1  Arformoterol Tartrate (BROVANA) 15 MCG/2ML NEBU, Take 2 mLs by nebulization 2 times daily, Disp: 120 mL, Rfl: 3  hydrALAZINE (APRESOLINE) 50 MG tablet, Take 1 tablet by mouth every 8 hours, Disp: 90 tablet, Rfl: 3  albuterol (PROVENTIL) (2.5 MG/3ML) 0.083% nebulizer solution, Take 3 mLs by nebulization every 4 hours as needed for Wheezing, Disp: 120 mL, Rfl: 5  budesonide (PULMICORT) 0.5 MG/2ML nebulizer suspension, Take 2 mLs by nebulization 2 times daily, Disp: 360 mL, Rfl: 3  formoterol (PERFOROMIST) 20 MCG/2ML nebulizer solution, Take 2 mLs by nebulization 2 times daily, Disp: 120 mL, Rfl: 3  nitroGLYCERIN (NITROSTAT) 0.4 MG SL tablet, Place 1 tablet under the tongue every 5 minutes as needed for Chest pain, Disp: 25 tablet, Rfl: 1  Multiple Vitamins-Minerals (MULTI FOR HER 50+ PO), Take 1 tablet by mouth daily, Disp: , Rfl:   NIFEdipine (ADALAT CC) 30 MG extended release tablet, Take 1 tablet by mouth daily (Patient not taking: Reported on 9/18/2020), Disp: 30 tablet, Rfl: 3  torsemide (DEMADEX) 20 MG tablet, Take 1 tablet by mouth daily (Patient not taking: Reported on 9/18/2020), Disp: 30 tablet, Rfl: 3    No current facility-administered medications for this visit.       ------------------------------------------               09/18/20 09/18/20                    0941            0942       ------------------------------------------   BP:        (!) 148/82      (!) 148/82     Site:    Left Upper Arm  Left Upper Arm   Position:     Sitting        Sitting       Cuff Size:   Large Adult    Large Adult     Pulse:                         60         Temp:   97.2 °F (36.2 °C)                 TempSrc:    Temporal                      Weight: 220 lb (99.8 kg)                  Height:  5' 4\" (1.626 m)                 ------------------------------------------  Body mass index is 37.76 kg/m². Wt Readings from Last 3 Encounters:  09/18/20 : 220 lb (99.8 kg)  09/14/20 : 219 lb (99.3 kg)  08/10/20 : 219 lb 12.8 oz (99.7 kg)    BP Readings from Last 3 Encounters:  09/18/20 : (!) 148/82  09/14/20 : (!) 180/80  08/10/20 : 138/78        Review of Systems    Objective:   Physical Exam  Constitutional:       General: She is not in acute distress. Appearance: Normal appearance. She is well-developed. She is not ill-appearing or diaphoretic. Cardiovascular:      Rate and Rhythm: Normal rate and regular rhythm. Heart sounds: Normal heart sounds. No murmur. No friction rub. No gallop.     Pulmonary: Effort: Pulmonary effort is normal. No tachypnea, accessory muscle usage or respiratory distress. Breath sounds: Wheezing present. No decreased breath sounds, rhonchi or rales. Comments: insp wheezing in posterior lobes  Lymphadenopathy:      Cervical: No cervical adenopathy. Upper Body:      Right upper body: No supraclavicular adenopathy. Left upper body: No supraclavicular adenopathy. Skin:     General: Skin is warm and dry. Coloration: Skin is not pale. Neurological:      Mental Status: She is alert. Assessment:       Diagnosis Orders   1. Hypertension, essential, benign  FRANKY August MD, Nephrology, [facility]   2. Insomnia, unspecified type     3.  Renal insufficiency  FRANKY August MD, Nephrology, [facility]       Orders Placed This Encounter   Medications    hydrOXYzine (VISTARIL) 25 MG capsule     Sig: Take 1 capsule by mouth 2 times daily as needed for Anxiety     Dispense:  30 capsule     Refill:  0     instructed to use but may only need to use in the evening for sleep   She is not using the cpap machine   She saw renal medicine in the hospital 2/28/20  Will need to foollow up for the renal status and bp      Plan:      Do use the cpap machine  Use the medicine for sleep  See the kidney   See in 4 weeks        Ezequiel Keating MD

## 2020-09-22 ENCOUNTER — OFFICE VISIT (OUTPATIENT)
Dept: PRIMARY CARE CLINIC | Age: 75
End: 2020-09-22
Payer: MEDICARE

## 2020-09-22 PROCEDURE — G8417 CALC BMI ABV UP PARAM F/U: HCPCS | Performed by: NURSE PRACTITIONER

## 2020-09-22 PROCEDURE — 99211 OFF/OP EST MAY X REQ PHY/QHP: CPT | Performed by: NURSE PRACTITIONER

## 2020-09-22 PROCEDURE — G8428 CUR MEDS NOT DOCUMENT: HCPCS | Performed by: NURSE PRACTITIONER

## 2020-09-22 NOTE — PROGRESS NOTES
Patient presented to [facility] drive up clinic for preop testing. Patient was swabbed and given information advising them to remain isolated until procedure date.

## 2020-09-23 ENCOUNTER — HOSPITAL ENCOUNTER (OUTPATIENT)
Dept: CARDIAC REHAB | Age: 75
Setting detail: THERAPIES SERIES
Discharge: HOME | DRG: 305 | End: 2020-09-23
Payer: MEDICARE

## 2020-09-23 ENCOUNTER — TELEPHONE (OUTPATIENT)
Dept: FAMILY MEDICINE CLINIC | Age: 75
End: 2020-09-23

## 2020-09-23 PROCEDURE — 94618 PULMONARY STRESS TESTING: CPT | Performed by: INTERNAL MEDICINE

## 2020-09-23 PROCEDURE — 94618 PULMONARY STRESS TESTING: CPT

## 2020-09-23 NOTE — TELEPHONE ENCOUNTER
----- Message from Joshua Garza sent at 9/23/2020 12:34 PM EDT -----  Subject: Results Request    QUESTIONS  Which lab or imaging result is the patient calling about? Covid 19  Which provider ordered the test? Pia Bateman   At what location was the test performed? Date the test was performed? 2020-09-22  Additional Information for Provider? Patient is calling for results  ---------------------------------------------------------------------------  --------------  CALL BACK INFO  What is the best way for the office to contact you? OK to leave message on   voicemail  Preferred Call Back Phone Number?  [phone]

## 2020-09-23 NOTE — TELEPHONE ENCOUNTER
----- Message from Supriya Rojas sent at 9/23/2020 12:34 PM EDT -----  Subject: Results Request    QUESTIONS  Which lab or imaging result is the patient calling about? Covid 19  Which provider ordered the test? Jerald Small   At what location was the test performed? Date the test was performed? 2020-09-22  Additional Information for Provider? Patient is calling for results  ---------------------------------------------------------------------------  --------------  CALL BACK INFO  What is the best way for the office to contact you? OK to leave message on   voicemail  Preferred Call Back Phone Number?  [phone]

## 2020-09-24 ENCOUNTER — APPOINTMENT (OUTPATIENT)
Dept: GENERAL RADIOLOGY | Age: 75
DRG: 305 | End: 2020-09-24
Payer: MEDICARE

## 2020-09-24 ENCOUNTER — HOSPITAL ENCOUNTER (INPATIENT)
Age: 75
LOS: 1 days | Discharge: HOME | DRG: 305 | End: 2020-09-25
Attending: EMERGENCY MEDICINE | Admitting: STUDENT IN AN ORGANIZED HEALTH CARE EDUCATION/TRAINING PROGRAM
Payer: MEDICARE

## 2020-09-24 PROBLEM — R82.71 ASYMPTOMATIC BACTERIURIA: Status: ACTIVE | Noted: 2020-09-24

## 2020-09-24 PROBLEM — J96.90 RESPIRATORY FAILURE (HCC): Status: ACTIVE | Noted: 2020-09-24

## 2020-09-24 PROBLEM — I16.9 HYPERTENSIVE CRISIS: Status: ACTIVE | Noted: 2020-09-24

## 2020-09-24 PROBLEM — I50.9 CHF (CONGESTIVE HEART FAILURE) (HCC): Status: ACTIVE | Noted: 2020-09-24

## 2020-09-24 LAB
A/G RATIO: 1.7 (ref 1.1–2.2)
ALBUMIN SERPL-MCNC: 4.2 G/DL (ref 3.4–5)
ALP BLD-CCNC: 82 U/L (ref 40–129)
ALT SERPL-CCNC: 29 U/L (ref 10–40)
AMPHETAMINE SCREEN, URINE: NORMAL
ANION GAP SERPL CALCULATED.3IONS-SCNC: 11 MMOL/L (ref 3–16)
AST SERPL-CCNC: 24 U/L (ref 15–37)
BACTERIA: ABNORMAL /HPF
BARBITURATE SCREEN URINE: NORMAL
BASOPHILS ABSOLUTE: 0.1 K/UL (ref 0–0.2)
BASOPHILS RELATIVE PERCENT: 1.1 %
BENZODIAZEPINE SCREEN, URINE: NORMAL
BILIRUB SERPL-MCNC: 0.4 MG/DL (ref 0–1)
BILIRUBIN URINE: NEGATIVE
BLOOD, URINE: ABNORMAL
BUN BLDV-MCNC: 24 MG/DL (ref 7–20)
CALCIUM SERPL-MCNC: 9.4 MG/DL (ref 8.3–10.6)
CANNABINOID SCREEN URINE: NORMAL
CHLORIDE BLD-SCNC: 99 MMOL/L (ref 99–110)
CLARITY: CLEAR
CO2: 28 MMOL/L (ref 21–32)
COCAINE METABOLITE SCREEN URINE: NORMAL
COLOR: YELLOW
CREAT SERPL-MCNC: 1.4 MG/DL (ref 0.6–1.2)
EOSINOPHILS ABSOLUTE: 0.3 K/UL (ref 0–0.6)
EOSINOPHILS RELATIVE PERCENT: 3.2 %
EPITHELIAL CELLS, UA: ABNORMAL /HPF (ref 0–5)
GFR AFRICAN AMERICAN: 44
GFR NON-AFRICAN AMERICAN: 37
GLOBULIN: 2.5 G/DL
GLUCOSE BLD-MCNC: 96 MG/DL (ref 70–99)
GLUCOSE URINE: NEGATIVE MG/DL
HCT VFR BLD CALC: 37.6 % (ref 36–48)
HEMOGLOBIN: 12.4 G/DL (ref 12–16)
KETONES, URINE: NEGATIVE MG/DL
LACTIC ACID: 0.9 MMOL/L (ref 0.4–2)
LEUKOCYTE ESTERASE, URINE: ABNORMAL
LYMPHOCYTES ABSOLUTE: 1.2 K/UL (ref 1–5.1)
LYMPHOCYTES RELATIVE PERCENT: 11.2 %
Lab: NORMAL
MAGNESIUM: 2.4 MG/DL (ref 1.8–2.4)
MCH RBC QN AUTO: 29.1 PG (ref 26–34)
MCHC RBC AUTO-ENTMCNC: 33 G/DL (ref 31–36)
MCV RBC AUTO: 88.2 FL (ref 80–100)
METHADONE SCREEN, URINE: NORMAL
MICROSCOPIC EXAMINATION: YES
MONOCYTES ABSOLUTE: 0.7 K/UL (ref 0–1.3)
MONOCYTES RELATIVE PERCENT: 6.5 %
NEUTROPHILS ABSOLUTE: 8.3 K/UL (ref 1.7–7.7)
NEUTROPHILS RELATIVE PERCENT: 78 %
NITRITE, URINE: NEGATIVE
OPIATE SCREEN URINE: NORMAL
OXYCODONE URINE: NORMAL
PDW BLD-RTO: 14.8 % (ref 12.4–15.4)
PH UA: 7
PH UA: 7 (ref 5–8)
PHENCYCLIDINE SCREEN URINE: NORMAL
PHOSPHORUS: 3.8 MG/DL (ref 2.5–4.9)
PLATELET # BLD: 193 K/UL (ref 135–450)
PMV BLD AUTO: 10 FL (ref 5–10.5)
POTASSIUM SERPL-SCNC: 5 MMOL/L (ref 3.5–5.1)
PRO-BNP: 3372 PG/ML (ref 0–449)
PROPOXYPHENE SCREEN: NORMAL
PROTEIN UA: 30 MG/DL
RBC # BLD: 4.27 M/UL (ref 4–5.2)
RBC UA: ABNORMAL /HPF (ref 0–4)
SARS-COV-2, NAA: NOT DETECTED
SODIUM BLD-SCNC: 138 MMOL/L (ref 136–145)
SPECIFIC GRAVITY UA: 1.01 (ref 1–1.03)
TOTAL PROTEIN: 6.7 G/DL (ref 6.4–8.2)
TROPONIN: <0.01 NG/ML
URINE REFLEX TO CULTURE: ABNORMAL
URINE TYPE: ABNORMAL
UROBILINOGEN, URINE: 0.2 E.U./DL
WBC # BLD: 10.6 K/UL (ref 4–11)
WBC UA: ABNORMAL /HPF (ref 0–5)

## 2020-09-24 PROCEDURE — 71046 X-RAY EXAM CHEST 2 VIEWS: CPT

## 2020-09-24 PROCEDURE — 80307 DRUG TEST PRSMV CHEM ANLYZR: CPT

## 2020-09-24 PROCEDURE — 2000000000 HC ICU R&B

## 2020-09-24 PROCEDURE — 6360000002 HC RX W HCPCS: Performed by: EMERGENCY MEDICINE

## 2020-09-24 PROCEDURE — 87186 SC STD MICRODIL/AGAR DIL: CPT

## 2020-09-24 PROCEDURE — 96375 TX/PRO/DX INJ NEW DRUG ADDON: CPT

## 2020-09-24 PROCEDURE — 81001 URINALYSIS AUTO W/SCOPE: CPT

## 2020-09-24 PROCEDURE — 84484 ASSAY OF TROPONIN QUANT: CPT

## 2020-09-24 PROCEDURE — 83605 ASSAY OF LACTIC ACID: CPT

## 2020-09-24 PROCEDURE — 83880 ASSAY OF NATRIURETIC PEPTIDE: CPT

## 2020-09-24 PROCEDURE — 83735 ASSAY OF MAGNESIUM: CPT

## 2020-09-24 PROCEDURE — 6370000000 HC RX 637 (ALT 250 FOR IP): Performed by: EMERGENCY MEDICINE

## 2020-09-24 PROCEDURE — 93005 ELECTROCARDIOGRAM TRACING: CPT | Performed by: EMERGENCY MEDICINE

## 2020-09-24 PROCEDURE — 84100 ASSAY OF PHOSPHORUS: CPT

## 2020-09-24 PROCEDURE — 87150 DNA/RNA AMPLIFIED PROBE: CPT

## 2020-09-24 PROCEDURE — 87040 BLOOD CULTURE FOR BACTERIA: CPT

## 2020-09-24 PROCEDURE — 80053 COMPREHEN METABOLIC PANEL: CPT

## 2020-09-24 PROCEDURE — 85025 COMPLETE CBC W/AUTO DIFF WBC: CPT

## 2020-09-24 PROCEDURE — 2500000003 HC RX 250 WO HCPCS: Performed by: EMERGENCY MEDICINE

## 2020-09-24 PROCEDURE — 36415 COLL VENOUS BLD VENIPUNCTURE: CPT

## 2020-09-24 PROCEDURE — 93005 ELECTROCARDIOGRAM TRACING: CPT | Performed by: STUDENT IN AN ORGANIZED HEALTH CARE EDUCATION/TRAINING PROGRAM

## 2020-09-24 PROCEDURE — 99285 EMERGENCY DEPT VISIT HI MDM: CPT

## 2020-09-24 PROCEDURE — 2580000003 HC RX 258: Performed by: EMERGENCY MEDICINE

## 2020-09-24 PROCEDURE — 96365 THER/PROPH/DIAG IV INF INIT: CPT

## 2020-09-24 RX ORDER — ACETAMINOPHEN 325 MG/1
650 TABLET ORAL EVERY 6 HOURS PRN
Status: DISCONTINUED | OUTPATIENT
Start: 2020-09-24 | End: 2020-09-25 | Stop reason: HOSPADM

## 2020-09-24 RX ORDER — ONDANSETRON 2 MG/ML
4 INJECTION INTRAMUSCULAR; INTRAVENOUS EVERY 6 HOURS PRN
Status: DISCONTINUED | OUTPATIENT
Start: 2020-09-24 | End: 2020-09-25 | Stop reason: HOSPADM

## 2020-09-24 RX ORDER — LABETALOL HYDROCHLORIDE 5 MG/ML
20 INJECTION, SOLUTION INTRAVENOUS EVERY 10 MIN PRN
Status: DISCONTINUED | OUTPATIENT
Start: 2020-09-24 | End: 2020-09-25 | Stop reason: HOSPADM

## 2020-09-24 RX ORDER — BUDESONIDE 0.5 MG/2ML
500 INHALANT ORAL 2 TIMES DAILY
Status: DISCONTINUED | OUTPATIENT
Start: 2020-09-24 | End: 2020-09-25 | Stop reason: HOSPADM

## 2020-09-24 RX ORDER — ACETAMINOPHEN 650 MG/1
650 SUPPOSITORY RECTAL EVERY 6 HOURS PRN
Status: DISCONTINUED | OUTPATIENT
Start: 2020-09-24 | End: 2020-09-25 | Stop reason: HOSPADM

## 2020-09-24 RX ORDER — NITROGLYCERIN 20 MG/100ML
5 INJECTION INTRAVENOUS CONTINUOUS
Status: DISCONTINUED | OUTPATIENT
Start: 2020-09-24 | End: 2020-09-25 | Stop reason: HOSPADM

## 2020-09-24 RX ORDER — METOPROLOL TARTRATE 5 MG/5ML
5 INJECTION INTRAVENOUS ONCE
Status: COMPLETED | OUTPATIENT
Start: 2020-09-24 | End: 2020-09-24

## 2020-09-24 RX ORDER — IPRATROPIUM BROMIDE AND ALBUTEROL SULFATE 2.5; .5 MG/3ML; MG/3ML
1 SOLUTION RESPIRATORY (INHALATION)
Status: DISCONTINUED | OUTPATIENT
Start: 2020-09-25 | End: 2020-09-25

## 2020-09-24 RX ORDER — METHYLPREDNISOLONE SODIUM SUCCINATE 125 MG/2ML
60 INJECTION, POWDER, LYOPHILIZED, FOR SOLUTION INTRAMUSCULAR; INTRAVENOUS EVERY 6 HOURS
Status: DISCONTINUED | OUTPATIENT
Start: 2020-09-25 | End: 2020-09-25

## 2020-09-24 RX ORDER — FUROSEMIDE 10 MG/ML
20 INJECTION INTRAMUSCULAR; INTRAVENOUS DAILY
Status: DISCONTINUED | OUTPATIENT
Start: 2020-09-25 | End: 2020-09-25 | Stop reason: HOSPADM

## 2020-09-24 RX ORDER — BUSPIRONE HYDROCHLORIDE 5 MG/1
5 TABLET ORAL 2 TIMES DAILY
Status: DISCONTINUED | OUTPATIENT
Start: 2020-09-24 | End: 2020-09-25 | Stop reason: HOSPADM

## 2020-09-24 RX ORDER — IPRATROPIUM BROMIDE AND ALBUTEROL SULFATE 2.5; .5 MG/3ML; MG/3ML
1 SOLUTION RESPIRATORY (INHALATION) ONCE
Status: COMPLETED | OUTPATIENT
Start: 2020-09-24 | End: 2020-09-24

## 2020-09-24 RX ORDER — ARFORMOTEROL TARTRATE 15 UG/2ML
15 SOLUTION RESPIRATORY (INHALATION) 2 TIMES DAILY
Status: DISCONTINUED | OUTPATIENT
Start: 2020-09-24 | End: 2020-09-25 | Stop reason: HOSPADM

## 2020-09-24 RX ORDER — METHYLPREDNISOLONE SODIUM SUCCINATE 125 MG/2ML
125 INJECTION, POWDER, LYOPHILIZED, FOR SOLUTION INTRAMUSCULAR; INTRAVENOUS ONCE
Status: COMPLETED | OUTPATIENT
Start: 2020-09-24 | End: 2020-09-24

## 2020-09-24 RX ORDER — SODIUM CHLORIDE 0.9 % (FLUSH) 0.9 %
10 SYRINGE (ML) INJECTION PRN
Status: DISCONTINUED | OUTPATIENT
Start: 2020-09-24 | End: 2020-09-25 | Stop reason: HOSPADM

## 2020-09-24 RX ORDER — FUROSEMIDE 10 MG/ML
40 INJECTION INTRAMUSCULAR; INTRAVENOUS ONCE
Status: COMPLETED | OUTPATIENT
Start: 2020-09-24 | End: 2020-09-24

## 2020-09-24 RX ORDER — ASPIRIN 81 MG/1
324 TABLET, CHEWABLE ORAL ONCE
Status: COMPLETED | OUTPATIENT
Start: 2020-09-24 | End: 2020-09-24

## 2020-09-24 RX ORDER — NITROGLYCERIN 20 MG/100ML
5 INJECTION INTRAVENOUS CONTINUOUS
Status: DISCONTINUED | OUTPATIENT
Start: 2020-09-24 | End: 2020-09-24

## 2020-09-24 RX ORDER — ROSUVASTATIN CALCIUM 20 MG/1
20 TABLET, COATED ORAL DAILY
Status: DISCONTINUED | OUTPATIENT
Start: 2020-09-25 | End: 2020-09-25 | Stop reason: HOSPADM

## 2020-09-24 RX ORDER — METOPROLOL SUCCINATE 50 MG/1
50 TABLET, EXTENDED RELEASE ORAL DAILY
Status: DISCONTINUED | OUTPATIENT
Start: 2020-09-25 | End: 2020-09-25 | Stop reason: HOSPADM

## 2020-09-24 RX ORDER — SODIUM CHLORIDE 0.9 % (FLUSH) 0.9 %
10 SYRINGE (ML) INJECTION EVERY 12 HOURS SCHEDULED
Status: DISCONTINUED | OUTPATIENT
Start: 2020-09-24 | End: 2020-09-25 | Stop reason: HOSPADM

## 2020-09-24 RX ORDER — HYDRALAZINE HYDROCHLORIDE 50 MG/1
50 TABLET, FILM COATED ORAL EVERY 8 HOURS SCHEDULED
Status: DISCONTINUED | OUTPATIENT
Start: 2020-09-25 | End: 2020-09-25 | Stop reason: HOSPADM

## 2020-09-24 RX ORDER — HYDROXYZINE PAMOATE 25 MG/1
25 CAPSULE ORAL 2 TIMES DAILY PRN
Status: DISCONTINUED | OUTPATIENT
Start: 2020-09-24 | End: 2020-09-25 | Stop reason: HOSPADM

## 2020-09-24 RX ADMIN — CEFTRIAXONE 1 G: 1 INJECTION, POWDER, FOR SOLUTION INTRAMUSCULAR; INTRAVENOUS at 20:51

## 2020-09-24 RX ADMIN — IPRATROPIUM BROMIDE AND ALBUTEROL SULFATE 1 AMPULE: .5; 3 SOLUTION RESPIRATORY (INHALATION) at 18:33

## 2020-09-24 RX ADMIN — METHYLPREDNISOLONE SODIUM SUCCINATE 125 MG: 125 INJECTION, POWDER, FOR SOLUTION INTRAMUSCULAR; INTRAVENOUS at 18:47

## 2020-09-24 RX ADMIN — ASPIRIN 324 MG: 81 TABLET, CHEWABLE ORAL at 18:55

## 2020-09-24 RX ADMIN — METOPROLOL TARTRATE 5 MG: 5 INJECTION INTRAVENOUS at 19:50

## 2020-09-24 RX ADMIN — IPRATROPIUM BROMIDE AND ALBUTEROL SULFATE 1 AMPULE: .5; 3 SOLUTION RESPIRATORY (INHALATION) at 20:51

## 2020-09-24 RX ADMIN — FUROSEMIDE 40 MG: 10 INJECTION, SOLUTION INTRAMUSCULAR; INTRAVENOUS at 20:51

## 2020-09-24 ASSESSMENT — PAIN SCALES - GENERAL
PAINLEVEL_OUTOF10: 0
PAINLEVEL_OUTOF10: 0

## 2020-09-24 NOTE — ED NOTES
Patient refusing Covid Testing per nasopharyngeal. States \"I can't take it\" Resp easier after CABRERA Dugan  09/24/20 9659

## 2020-09-24 NOTE — ED PROVIDER NOTES
Triage Chief Complaint:   Shortness of Breath (x 3 weeks , h/o COPD, tested negative for Covid 3 days ago 9/22)    [place]:  Ulysses Berg is a 77 y.o. female that presents for worsening SOB/LI x weeks. PMH including but not limited to: history of chronic diastolic heart failure, CAD, COPD, history of thrombosis, abdominal aortic aneurysm, atrial fibrillation on chronic anticoagulation with Xarelto, essential hypertension, hyperlipidemia. No madan chest pain no fever no chills no headache no abdominal pain no nausea no vomiting. Test negative for COVID on 9/22    ROS:  At least 12 systems reviewed and otherwise acutely negative except as in the [address] Past Medical History:   Diagnosis Date    AAA (abdominal aortic aneurysm) ([facility] [address]     pt states it is 4cm    AAA (abdominal aortic aneurysm) without rupture (HCC) 2/10/2015    Atrial fibrillation (HCC)     CAD (coronary artery disease)     CHF (congestive heart failure) ([facility] [address]     COPD (chronic obstructive pulmonary disease) (HCC)     History of blood clots     Hyperlipidemia     Hypertension      Past Surgical History:   Procedure Laterality Date    ABDOMINAL AORTIC ANEURYSM REPAIR      Endovascular abdominal AA    APPENDECTOMY  1990    incidental    BLADDER SUSPENSION      CATARACT REMOVAL      CHOLECYSTECTOMY  10/15/13    COLONOSCOPY  9/2/07    dr Trung Oliva and check in 5 years.  COLONOSCOPY  06/16/2017    ok dr arndt, repeat 5 years   [address]    for benign tumor.  just the uterus    JOINT REPLACEMENT  12/2013    right knee replacement    TONSILLECTOMY  as a child    TUMOR EXCISION      benign behind right ear about 2008     Family History   Problem Relation Age of Onset    Heart Disease Father     Hypertension Other      Social History     Socioeconomic History    Marital status:      Spouse name: Not on file    Number of children: Not on file    Years of education: Not on file    Highest education level: Not on file Occupational History    Occupation: housewife   Social Needs    Financial resource strain: Not on file    Food insecurity     Worry: Not on file     Inability: Not on file   [employer] needs     Medical: Not on file     Non-medical: Not on file   Tobacco Use    Smoking status: Former Smoker     Packs/day: 1.00     Years: 37.00     Pack years: 37.00     Types: Cigarettes     Start date: 1976     Last attempt to quit: 2013     Years since quittin.0    Smokeless tobacco: Never Used    Tobacco comment: E cigarette now and then   Substance and Sexual Activity    Alcohol use: No    Drug use: No    Sexual activity: Yes     Partners: Male   Lifestyle    Physical activity     Days per week: Not on file     Minutes per session: Not on file    Stress: Not on file   Relationships    Social connections     Talks on phone: Not on file     Gets together: Not on file     Attends Protestant service: Not on file     Active member of club or organization: Not on file     Attends meetings of clubs or organizations: Not on file     Relationship status: Not on file    Intimate partner violence     Fear of current or ex partner: Not on file     Emotionally abused: Not on file     Physically abused: Not on file     Forced sexual activity: Not on file   Other Topics Concern    Not on file   Social History Narrative    Not on file     Current Facility-Administered Medications   Medication Dose Route Frequency Provider Last Rate Last Dose    ipratropium-albuterol (DUONEB) nebulizer solution 1 ampule  1 ampule Inhalation Once Dariel Palomino MD        methylPREDNISolone sodium (SOLU-MEDROL) injection 125 mg  125 mg Intravenous Once Dariel Palomino MD         Current Outpatient Medications   Medication Sig Dispense Refill    hydrOXYzine (VISTARIL) 25 MG capsule Take 1 capsule by mouth 2 times daily as needed for Anxiety 30 capsule 0    metoprolol succinate (TOPROL XL) 50 MG extended release tablet Take 1 tablet by mouth daily 90 tablet 3    rosuvastatin (CRESTOR) 20 MG tablet Take 1 tablet by mouth daily 30 tablet 11    isosorbide mononitrate (IMDUR) 30 MG extended release tablet TAKE ONE TABLET BY MOUTH DAILY 90 tablet 5    rivaroxaban (XARELTO) 20 MG TABS tablet TAKE ONE TABLET BY MOUTH DAILY WITH BREAKFAST 30 tablet 5    albuterol sulfate HFA (PROAIR HFA) 108 (90 Base) MCG/ACT inhaler Inhale 2 puffs into the lungs every 4 hours as needed for Wheezing or Shortness of Breath 1 Inhaler 11    busPIRone (BUSPAR) 5 MG tablet Take 1 tablet by mouth 2 times daily 60 tablet 1    hydrALAZINE (APRESOLINE) 50 MG tablet Take 1 tablet by mouth every 8 hours 90 tablet 3    albuterol (PROVENTIL) (2.5 MG/3ML) 0.083% nebulizer solution Take 3 mLs by nebulization every 4 hours as needed for Wheezing 120 mL 5    Arformoterol Tartrate (BROVANA) 15 MCG/2ML NEBU Take 2 mLs by nebulization 2 times daily 120 mL 3    NIFEdipine (ADALAT CC) 30 MG extended release tablet Take 1 tablet by mouth daily (Patient not taking: Reported on 9/18/2020) 30 tablet 3    torsemide (DEMADEX) 20 MG tablet Take 1 tablet by mouth daily (Patient not taking: Reported on 9/18/2020) 30 tablet 3    budesonide (PULMICORT) 0.5 MG/2ML nebulizer suspension Take 2 mLs by nebulization 2 times daily 360 mL 3    formoterol (PERFOROMIST) 20 MCG/2ML nebulizer solution Take 2 mLs by nebulization 2 times daily 120 mL 3    nitroGLYCERIN (NITROSTAT) 0.4 MG SL tablet Place 1 tablet under the tongue every 5 minutes as needed for Chest pain 25 tablet 1    Multiple Vitamins-Minerals (MULTI FOR HER 50+ PO) Take 1 tablet by mouth daily       Allergies   Allergen Reactions    Morphine Rash     rash    Other Rash       [unfilled]    Nursing Notes Reviewed    Physical Exam:  Vitals:    09/24/20 1807   BP: (!) 210/120   Pulse: 70   Resp: 23   Temp: 98.1 °F (36.7 °C)   SpO2: 94%       GENERAL APPEARANCE: Awake and alert. Cooperative. No acute distress.    HEAD: note may have been completed with a voice recognition program. Efforts were made to edit the dictations but occasionally words are mis-transcribed.)    MD Candelaria Bradford MD  09/24/20 86

## 2020-09-24 NOTE — PROCEDURES
[facility] Cardiopulmonary Rehabilitation   Six Minute Walk Test    Paco Leeheidy ANDERSONB: 1945  Account Number: [de-identified]  AGE: 77 y.o.     OP test [x]   Pulmonary Rehab PRE []  POST   []    Diagnosis: COPD, severe      Referring Physician: Dr. Matt Huntley    Gender []  male [x] female AGE: 77     Height: 5 ft 4 in 163 cm    Weight: 219lbs 99 kg  Blood Pressure 140/90    Medications affecting heart rate: Metoprolol, Albuterol 2 puffs every 4 hours PRN, Albuterol neb every 4 hrs PRN      Supplemental O2 during the test [x]  no [] yes  lpm     [] continuous []  intermittent    Device : [] NC []  HFNC    []  oximizer  [] mask      Laps completed: 4 (1 lap = 100 ft)        Baseline (resting) Walking End of Test (recovery)      Heart Rate 60   85  60    BP  140/90   160/100  142/94    Dyspnea 1   5  1 (Danielle scale)    Fatigue 0   5  0 (Danielle scale)    SpO2  97%   93%  98%         Stopped or paused before 6 mins? []  no [x] yes How long? 2 minutes and 25 seconds    Symptoms at end of exercise:[] angina  [] dizziness  []  hip, leg, calf, back pain       []  no complaints [x]  Other: needed to stop due to shortness of breath and wheezing    Number of laps 4 (x 30.48 meters)=122 + final partial lap: 0    Total distance walked in 6 mins: 122 meters    Predicted distance: 357 meters  Percent predicted: 34%              [] normal       [x] reduced     Summary: This is an outpatient 6 minute walk test, performed on room air. The patient ambulated 122 meters which is abnormal- 34-% predicted. Her, heart rate response was normal, the blood pressure response was normal, oxygen saturations were normal.  The patient desaturated to 93% while ambulating on room air. The degree of symptoms based on the Danielle Dyspnea/Fatigue scale were increased with testing. This test does not indicate the need for supplemental oxygen.             Sheng Hastings DO  Pulmonary Rehab Director

## 2020-09-24 NOTE — ED NOTES
Presents with shortness of breath on going x 3 weeks per family. Reports that she had covid test with negative result 9/22. Denies cough, denies fever, no chills, h/o COPD and she has been using inhalers. SHORTNESS OF BREATH and wheezing with exertion. Lungs with rales on bases, audible wheezing with any exertion. Skin warm and dry, Alert and oriented x 3. MD notified of patient's arrival. Sarahy Downing on cardiac monitor and SpO2 continuously.       Dandy Limon RN  09/24/20 9554

## 2020-09-25 VITALS
OXYGEN SATURATION: 96 % | HEART RATE: 61 BPM | BODY MASS INDEX: 37.07 KG/M2 | SYSTOLIC BLOOD PRESSURE: 125 MMHG | RESPIRATION RATE: 21 BRPM | WEIGHT: 217.15 LBS | DIASTOLIC BLOOD PRESSURE: 49 MMHG | TEMPERATURE: 98.3 F | HEIGHT: 64 IN

## 2020-09-25 PROBLEM — N39.0 UTI (URINARY TRACT INFECTION): Status: ACTIVE | Noted: 2020-09-25

## 2020-09-25 LAB
ANION GAP SERPL CALCULATED.3IONS-SCNC: 15 MMOL/L (ref 3–16)
BASOPHILS ABSOLUTE: 0 K/UL (ref 0–0.2)
BASOPHILS RELATIVE PERCENT: 0.2 %
BUN BLDV-MCNC: 27 MG/DL (ref 7–20)
CALCIUM SERPL-MCNC: 9.6 MG/DL (ref 8.3–10.6)
CHLORIDE BLD-SCNC: 98 MMOL/L (ref 99–110)
CO2: 25 MMOL/L (ref 21–32)
CREAT SERPL-MCNC: 1.2 MG/DL (ref 0.6–1.2)
EKG ATRIAL RATE: 59 BPM
EKG ATRIAL RATE: 92 BPM
EKG DIAGNOSIS: NORMAL
EKG DIAGNOSIS: NORMAL
EKG P AXIS: 68 DEGREES
EKG P-R INTERVAL: 192 MS
EKG Q-T INTERVAL: 408 MS
EKG Q-T INTERVAL: 460 MS
EKG QRS DURATION: 94 MS
EKG QRS DURATION: 94 MS
EKG QTC CALCULATION (BAZETT): 455 MS
EKG QTC CALCULATION (BAZETT): 482 MS
EKG R AXIS: -22 DEGREES
EKG R AXIS: -25 DEGREES
EKG T AXIS: 55 DEGREES
EKG T AXIS: 58 DEGREES
EKG VENTRICULAR RATE: 59 BPM
EKG VENTRICULAR RATE: 84 BPM
EOSINOPHILS ABSOLUTE: 0 K/UL (ref 0–0.6)
EOSINOPHILS RELATIVE PERCENT: 0 %
GFR AFRICAN AMERICAN: 53
GFR NON-AFRICAN AMERICAN: 44
GLUCOSE BLD-MCNC: 131 MG/DL (ref 70–99)
HCT VFR BLD CALC: 42.7 % (ref 36–48)
HEMOGLOBIN: 13.8 G/DL (ref 12–16)
LV EF: 60 %
LVEF MODALITY: NORMAL
LYMPHOCYTES ABSOLUTE: 0.7 K/UL (ref 1–5.1)
LYMPHOCYTES RELATIVE PERCENT: 8.7 %
MAGNESIUM: 2.3 MG/DL (ref 1.8–2.4)
MCH RBC QN AUTO: 28.6 PG (ref 26–34)
MCHC RBC AUTO-ENTMCNC: 32.4 G/DL (ref 31–36)
MCV RBC AUTO: 88.1 FL (ref 80–100)
MONOCYTES ABSOLUTE: 0.2 K/UL (ref 0–1.3)
MONOCYTES RELATIVE PERCENT: 2.3 %
NEUTROPHILS ABSOLUTE: 7.1 K/UL (ref 1.7–7.7)
NEUTROPHILS RELATIVE PERCENT: 88.8 %
PDW BLD-RTO: 15.1 % (ref 12.4–15.4)
PLATELET # BLD: 181 K/UL (ref 135–450)
PMV BLD AUTO: 9.9 FL (ref 5–10.5)
POTASSIUM SERPL-SCNC: 4.4 MMOL/L (ref 3.5–5.1)
RBC # BLD: 4.84 M/UL (ref 4–5.2)
REPORT: NORMAL
SODIUM BLD-SCNC: 138 MMOL/L (ref 136–145)
WBC # BLD: 8 K/UL (ref 4–11)

## 2020-09-25 PROCEDURE — 83735 ASSAY OF MAGNESIUM: CPT

## 2020-09-25 PROCEDURE — 93306 TTE W/DOPPLER COMPLETE: CPT

## 2020-09-25 PROCEDURE — 93010 ELECTROCARDIOGRAM REPORT: CPT | Performed by: INTERNAL MEDICINE

## 2020-09-25 PROCEDURE — 6370000000 HC RX 637 (ALT 250 FOR IP): Performed by: INTERNAL MEDICINE

## 2020-09-25 PROCEDURE — 36415 COLL VENOUS BLD VENIPUNCTURE: CPT

## 2020-09-25 PROCEDURE — 99291 CRITICAL CARE FIRST HOUR: CPT | Performed by: INTERNAL MEDICINE

## 2020-09-25 PROCEDURE — 80048 BASIC METABOLIC PNL TOTAL CA: CPT

## 2020-09-25 PROCEDURE — 2580000003 HC RX 258: Performed by: STUDENT IN AN ORGANIZED HEALTH CARE EDUCATION/TRAINING PROGRAM

## 2020-09-25 PROCEDURE — 6360000002 HC RX W HCPCS: Performed by: STUDENT IN AN ORGANIZED HEALTH CARE EDUCATION/TRAINING PROGRAM

## 2020-09-25 PROCEDURE — 94640 AIRWAY INHALATION TREATMENT: CPT

## 2020-09-25 PROCEDURE — 2500000003 HC RX 250 WO HCPCS: Performed by: STUDENT IN AN ORGANIZED HEALTH CARE EDUCATION/TRAINING PROGRAM

## 2020-09-25 PROCEDURE — 6370000000 HC RX 637 (ALT 250 FOR IP): Performed by: STUDENT IN AN ORGANIZED HEALTH CARE EDUCATION/TRAINING PROGRAM

## 2020-09-25 PROCEDURE — 85025 COMPLETE CBC W/AUTO DIFF WBC: CPT

## 2020-09-25 PROCEDURE — 99223 1ST HOSP IP/OBS HIGH 75: CPT | Performed by: INTERNAL MEDICINE

## 2020-09-25 PROCEDURE — 94761 N-INVAS EAR/PLS OXIMETRY MLT: CPT

## 2020-09-25 RX ORDER — HYDROXYZINE HYDROCHLORIDE 10 MG/1
10 TABLET, FILM COATED ORAL 3 TIMES DAILY
Status: DISCONTINUED | OUTPATIENT
Start: 2020-09-25 | End: 2020-09-25 | Stop reason: HOSPADM

## 2020-09-25 RX ORDER — NIFEDIPINE 60 MG/1
60 TABLET, EXTENDED RELEASE ORAL DAILY
Status: DISCONTINUED | OUTPATIENT
Start: 2020-09-25 | End: 2020-09-25 | Stop reason: HOSPADM

## 2020-09-25 RX ORDER — METHYLPREDNISOLONE SODIUM SUCCINATE 125 MG/2ML
60 INJECTION, POWDER, LYOPHILIZED, FOR SOLUTION INTRAMUSCULAR; INTRAVENOUS EVERY 12 HOURS
Status: DISCONTINUED | OUTPATIENT
Start: 2020-09-25 | End: 2020-09-25 | Stop reason: HOSPADM

## 2020-09-25 RX ORDER — AZITHROMYCIN 500 MG/1
500 TABLET, FILM COATED ORAL DAILY
Status: DISCONTINUED | OUTPATIENT
Start: 2020-09-25 | End: 2020-09-25 | Stop reason: HOSPADM

## 2020-09-25 RX ORDER — NIFEDIPINE 60 MG/1
60 TABLET, FILM COATED, EXTENDED RELEASE ORAL DAILY
Qty: 30 TABLET | Refills: 1 | Status: SHIPPED | OUTPATIENT
Start: 2020-09-25 | End: 2020-12-29 | Stop reason: SDUPTHER

## 2020-09-25 RX ORDER — IPRATROPIUM BROMIDE AND ALBUTEROL SULFATE 2.5; .5 MG/3ML; MG/3ML
1 SOLUTION RESPIRATORY (INHALATION) EVERY 6 HOURS
Status: DISCONTINUED | OUTPATIENT
Start: 2020-09-25 | End: 2020-09-25 | Stop reason: HOSPADM

## 2020-09-25 RX ADMIN — FUROSEMIDE 20 MG: 10 INJECTION, SOLUTION INTRAMUSCULAR; INTRAVENOUS at 07:55

## 2020-09-25 RX ADMIN — ROSUVASTATIN CALCIUM 20 MG: 20 TABLET, FILM COATED ORAL at 07:54

## 2020-09-25 RX ADMIN — HYDROXYZINE HYDROCHLORIDE 10 MG: 10 TABLET, FILM COATED ORAL at 13:12

## 2020-09-25 RX ADMIN — BUDESONIDE 500 MCG: 0.5 SUSPENSION RESPIRATORY (INHALATION) at 07:47

## 2020-09-25 RX ADMIN — IPRATROPIUM BROMIDE AND ALBUTEROL SULFATE 1 AMPULE: .5; 3 SOLUTION RESPIRATORY (INHALATION) at 13:57

## 2020-09-25 RX ADMIN — IPRATROPIUM BROMIDE AND ALBUTEROL SULFATE 1 AMPULE: .5; 3 SOLUTION RESPIRATORY (INHALATION) at 07:47

## 2020-09-25 RX ADMIN — METHYLPREDNISOLONE SODIUM SUCCINATE 60 MG: 125 INJECTION, POWDER, FOR SOLUTION INTRAMUSCULAR; INTRAVENOUS at 01:38

## 2020-09-25 RX ADMIN — METHYLPREDNISOLONE SODIUM SUCCINATE 60 MG: 125 INJECTION, POWDER, FOR SOLUTION INTRAMUSCULAR; INTRAVENOUS at 13:12

## 2020-09-25 RX ADMIN — AZITHROMYCIN DIHYDRATE 500 MG: 500 INJECTION, POWDER, LYOPHILIZED, FOR SOLUTION INTRAVENOUS at 01:38

## 2020-09-25 RX ADMIN — BUSPIRONE HYDROCHLORIDE 5 MG: 5 TABLET ORAL at 07:55

## 2020-09-25 RX ADMIN — HYDRALAZINE HYDROCHLORIDE 50 MG: 50 TABLET, FILM COATED ORAL at 05:04

## 2020-09-25 RX ADMIN — NITROGLYCERIN 5 MCG/MIN: 20 INJECTION INTRAVENOUS at 06:26

## 2020-09-25 RX ADMIN — HYDROXYZINE HYDROCHLORIDE 10 MG: 10 TABLET, FILM COATED ORAL at 10:22

## 2020-09-25 RX ADMIN — HYDRALAZINE HYDROCHLORIDE 50 MG: 50 TABLET, FILM COATED ORAL at 13:12

## 2020-09-25 RX ADMIN — METOPROLOL SUCCINATE 50 MG: 50 TABLET, EXTENDED RELEASE ORAL at 09:03

## 2020-09-25 RX ADMIN — ARFORMOTEROL TARTRATE 15 MCG: 15 SOLUTION RESPIRATORY (INHALATION) at 07:47

## 2020-09-25 RX ADMIN — NIFEDIPINE 60 MG: 60 TABLET, EXTENDED RELEASE ORAL at 07:54

## 2020-09-25 ASSESSMENT — PAIN SCALES - GENERAL
PAINLEVEL_OUTOF10: 0
PAINLEVEL_OUTOF10: 0

## 2020-09-25 NOTE — ED PROVIDER NOTES
Emergency Department Encounter  Location: [address] [place] [address]    Patient: Lucia Garner  MRN: [id number]  : 1945  Date of evaluation: 2020  ED Provider: Vianey Campbell MD    9:00 PM  20    Lucia Garner was checked out to me by Dr. Adi Hinton. Please see his/her initial documentation for details of the patient's initial ED presentation, physical exam and completed studies. In brief, Lucia Garner is a 77 y.o. female that presented to the emergency department for evaluation of worsening dyspnea which has been progressively worsening for the past 3 weeks. She states that she has been using her duo nebs and prescribed medications without improvement. She recently had a CT scan performed by her primary care physician. It was documented in her last visit that she is not using CPAP. Patient Vini Bray seen and evaluated by the outgoing physician. She was signed out pending resolution of her laboratory work-up as well as chest x-ray. Patient was noted to be hypoxic while ambulating into the emergency department. She denied chest pain on presentation.   She was noted to be hypertensive with blood pressures in the 324-086 range systolically on arrival    I have reviewed and interpreted all of the currently available lab results and diagnostics from this visit:  Results for orders placed or performed during the hospital encounter of 20   CBC Auto Differential   Result Value Ref Range    WBC 10.6 4.0 - 11.0 K/uL    RBC 4.27 4.00 - 5.20 M/uL    Hemoglobin 12.4 12.0 - 16.0 g/dL    Hematocrit 37.6 36.0 - 48.0 %    MCV 88.2 80.0 - 100.0 fL    MCH 29.1 26.0 - 34.0 pg    MCHC 33.0 31.0 - 36.0 g/dL    RDW 14.8 12.4 - 15.4 %    Platelets 518 260 - 333 K/uL    MPV 10.0 5.0 - 10.5 fL    Neutrophils % 78.0 %    Lymphocytes % 11.2 %    Monocytes % 6.5 %    Eosinophils % 3.2 %    Basophils % 1.1 %    Neutrophils Absolute 8.3 (H) 1.7 - 7.7 K/uL    Lymphocytes Absolute 1.2 1.0 - 5.1 K/uL Monocytes Absolute 0.7 0.0 - 1.3 K/uL    Eosinophils Absolute 0.3 0.0 - 0.6 K/uL    Basophils Absolute 0.1 0.0 - 0.2 K/uL   Comprehensive Metabolic Panel   Result Value Ref Range    Sodium 138 136 - 145 mmol/L    Potassium 5.0 3.5 - 5.1 mmol/L    Chloride 99 99 - 110 mmol/L    CO2 28 21 - 32 mmol/L    Anion Gap 11 3 - 16    Glucose 96 70 - 99 mg/dL    BUN 24 (H) 7 - 20 mg/dL    CREATININE 1.4 (H) 0.6 - 1.2 mg/dL    GFR Non- 37 (A) >60    GFR  44 (A) >60    Calcium 9.4 8.3 - 10.6 mg/dL    Total Protein 6.7 6.4 - 8.2 g/dL    Alb 4.2 3.4 - 5.0 g/dL    Albumin/Globulin Ratio 1.7 1.1 - 2.2    Total Bilirubin 0.4 0.0 - 1.0 mg/dL    Alkaline Phosphatase 82 40 - 129 U/L    ALT 29 10 - 40 U/L    AST 24 15 - 37 U/L    Globulin 2.5 g/dL   Brain Natriuretic Peptide   Result Value Ref Range    Pro-BNP 3,372 (H) 0 - 449 pg/mL   Troponin   Result Value Ref Range    Troponin <0.01 <0.01 ng/mL   Lactic Acid, Plasma   Result Value Ref Range    Lactic Acid 0.9 0.4 - 2.0 mmol/L   Urine reflex to culture    Specimen: Urine, clean catch   Result Value Ref Range    Color, UA Yellow Straw/Yellow    Clarity, UA Clear Clear    Glucose, Ur Negative Negative mg/dL    Bilirubin Urine Negative Negative    Ketones, Urine Negative Negative mg/dL    Specific Gravity, UA 1.010 1.005 - 1.030    Blood, Urine TRACE-LYSED (A) Negative    pH, UA 7.0 5.0 - 8.0    Protein, UA 30 (A) Negative mg/dL    Urobilinogen, Urine 0.2 <2.0 E.U./dL    Nitrite, Urine Negative Negative    Leukocyte Esterase, Urine TRACE (A) Negative    Microscopic Examination YES     Urine Type Voided     Urine Reflex to Culture Not Indicated    Magnesium   Result Value Ref Range    Magnesium 2.40 1.80 - 2.40 mg/dL   Microscopic Urinalysis   Result Value Ref Range    WBC, UA 0-2 0 - 5 /HPF    RBC, UA 0-2 0 - 4 /HPF    Epithelial Cells, UA 0-1 0 - 5 /HPF    Bacteria, UA 1+ (A) None Seen /HPF     Xr Chest (2 Vw)    Result Date: 9/24/2020  EXAMINATION: TWO XRAY VIEWS OF THE CHEST 9/24/2020 3:32 pm COMPARISON: 02/25/2020 HISTORY: ORDERING SYSTEM PROVIDED HISTORY: sob TECHNOLOGIST PROVIDED HISTORY: Reason for exam:->sob Reason for Exam: bridget, james x 3 weeks Acuity: Acute Type of Exam: Initial Relevant Medical/Surgical History: copd, neg covid 3 days ago FINDINGS: Cardial pericardial silhouette is enlarged but stable. There is chronic blunting of the costophrenic angles. No focal infiltrate is seen. No pneumothorax is found. No free air. No acute bony abnormality. No acute abnormality detected. Final ED Course and MDM: In brief, Rylie Diza is a 77 y.o. female whose care was signed out to me by the outgoing provider. In brief, patient presented to the ED for evaluation of exertional dyspnea with associated wheezing and hypertension on arrival to the ED. she was noted to be hypoxic while ambulating but is satting well while resting. To my evaluation the patient did have bilateral expiratory wheezing, after DuoNeb treatments. His laboratory work-up was remarkable for an elevated BNP. Renal function appeared to be at baseline. Troponin within normal limits. Chest x-ray without acute cardiopulmonary disease. On reevaluation the patient remains dyspneic. She was given a dose of IV metoprolol which is a home medication for her hypertension which did improve her blood pressure into the 200-1 90 range. Due to the patient's persistent dyspnea despite using her home medications I did recommend admission to the hospital and she was amenable to treatment plan. I did speak with the hospitalist who recommended starting the patient on a nitroglycerin drip although her blood pressure did improve after IV Lasix as well as metoprolol. Nitroglycerin was not started as the patient's blood pressure remained between 190 and 200. patient remained stable while in the emergency department was transferred in stable condition.      Critical Care  There was a high probability of life-threatening clinical deterioration in the patient's condition requiring my urgent intervention. Total critical care time with the patient was 35 minutes excluding separately reportable procedures. Critical care required due to patients hypoxia, dyspnea, and hypertension. ED Medication Orders (From admission, onward)    Start Ordered     Status Ordering Provider    09/24/20 2045 09/24/20 2032  furosemide (LASIX) injection 40 mg  ONCE      Last MAR action:  Given - by Jhony Francisco on 09/24/20 at [address] P    09/24/20 2045 09/24/20 2033  nitroGLYCERIN 50 mg in dextrose 5% 250 mL infusion  CONTINUOUS      Acknowledged INDIRA GILLIS P    09/24/20 2000 09/24/20 1959  ipratropium-albuterol (DUONEB) nebulizer solution 1 ampule  ONCE      Last MAR action:  Given - by Jhony Francisco on 09/24/20 at [address] P    09/24/20 2000 09/24/20 1959  cefTRIAXone (ROCEPHIN) 1 g IVPB in 50 mL D5W minibag  ONCE     Question Answer Comment   Antimicrobial Indications Urinary Tract Infection    UTI duration of therapy 5 days        Last MAR action:  New Bag - by Jhony Francisco on 09/24/20 at [address] Spells P    09/24/20 1915 09/24/20 1909  metoprolol (LOPRESSOR) injection 5 mg  ONCE      Last MAR action:  Given - by Jhony Francisco on 09/24/20 at [address], [address] [facility]    09/24/20 1845 09/24/20 1839  aspirin chewable tablet 324 mg  ONCE      Last MAR action:  Given - [facility] Jhony Francisco [facility]20 [facility]    09/24/20 1830 09/24/20 1820  ipratropium-albuterol (DUONEB) nebulizer solution 1 ampule  ONCE      Last MAR action:  Given - by DEBBY MILLER on 09/24/20 at [address]    09/24/20 1830 09/24/20 1820  methylPREDNISolone sodium (SOLU-MEDROL) injection 125 mg  ONCE      Last MAR action:  Given - by DEBBY MILLER on 09/24/20 at [place] [address], [place]           Final Impression      1. COPD exacerbation ([address]    2. Acute respiratory failure with hypoxia (HCC)    3. Hypertensive urgency        DISPOSITION Admitted 09/24/2020 08:53:56 PM     (Please note that portions of this note may have been completed with a voice recognition program. Efforts were made to edit the dictations but occasionally words are mis-transcribed.)    Rhiannon Fitzgerald, [address] Harsha Dubose MD  09/24/20 2100       Rhiannon Fitzgerald MD  09/24/20 4983

## 2020-09-25 NOTE — ED NOTES
ED SBAR report provided to [facility]. Patient to be transported to Room 2111 via stretcher by [facility]. IV site clean, dry, and intact. MEWS score and pain assessed as 0/10 and documented. Updated patient and family on plan of care.      Claude Neve, RN  09/24/20 3634

## 2020-09-25 NOTE — ACP (ADVANCE CARE PLANNING)
Advance Care Planning     Advance Care Planning Activator (Inpatient)  Conversation Note      Date of ACP Conversation: 9/25/2020    Conversation Conducted with: Patient with Decision Making Capacity    ACP Activator: Gloria Calvin    Patient has a HC POA/Living Will in electronic chart, confirmed this is up to date and correct. Health Care Decision Maker:     Current Designated Health Care Decision Maker:   Primary Decision Maker: Nicky Arias Spouse - [phone]    Secondary Decision Maker: Sheryle Hopping Child - [phone]    Care Preferences    Ventilation: \"If you were in your present state of health and suddenly became very ill and were unable to breathe on your own, what would your preference be about the use of a ventilator (breathing machine) if it were available to you? \"      Would the patient desire the use of ventilator (breathing machine)?: yes    \"If your health worsens and it becomes clear that your chance of recovery is unlikely, what would your preference be about the use of a ventilator (breathing machine) if it were available to you? \"     Would the patient desire the use of ventilator (breathing machine)?: No      Resuscitation  \"CPR works best to restart the heart when there is a sudden event, like a heart attack, in someone who is otherwise healthy. Unfortunately, CPR does not typically restart the heart for people who have serious health conditions or who are very sick. \"    \"In the event your heart stopped as a result of an underlying serious health condition, would you want attempts to be made to restart your heart (answer \"yes\" for attempt to resuscitate) or would you prefer a natural death (answer \"no\" for do not attempt to resuscitate)? \" yes     [] Yes   [x] No   Educated Patient / Yuri Galicia regarding differences between Advance Directives and portable DNR orders.     Length of ACP Conversation in minutes:  10 minutes     Conversation Outcomes:  [x] ACP discussion completed  [] Existing advance directive reviewed with patient; no changes to patient's previously recorded wishes  [] New Advance Directive completed  [] Portable Do Not Rescitate prepared for Provider review and signature  [] POLST/POST/MOLST/MOST prepared for Provider review and signature      Follow-up plan:    [] Schedule follow-up conversation to continue planning  [] Referred individual to Provider for additional questions/concerns   [] Advised patient/agent/surrogate to review completed ACP document and update if needed with changes in condition, patient preferences or care setting    [x] This note routed to one or more involved healthcare providers        Electronically signed by Baldomero Henriquez RN on 9/25/2020 at 1:59 PM

## 2020-09-25 NOTE — PROGRESS NOTES
Nutrition Assessment     Type and Reason for Visit: Initial, Positive Nutrition Screen(Pt requests to speak with a Dietitian)    Nutrition Recommendations/Plan:   Encouraged pt to contact Dietitian should any questions arise regarding diet     Nutrition Assessment:  Pt with pmh of CHF, COPD, HLD, HTN adm & found to have hypertensive urgency. Diet advanced to 2 gm Na / ccc / 1500 ml per day. Pt denied DM, but does watch her carbs as well as sodium at home. Pt inquiring about whether specific foods / fluids should be included in the diet to help manage COPD. Encouraged pt to continue following current diet to ensure general healthful nutrition, that no specific food or fluid will help avoid COPD exacerbations. Noted that pt has been educated on diet therapy for CHF. Denied further ed needs.     Malnutrition Assessment:  Malnutrition Status: No malnutrition    Nutrition Related Findings:        Current Nutrition Therapies:    DIET LOW SODIUM 2 GM; Carb Control: 4 carb choices (60 gms)/meal; 1500 ml    Anthropometric Measures:  · Height: 5' 4\" (162.6 cm)  · Current Body Wt: 217 lb (98.4 kg)   · BMI: 37.2    Nutrition Diagnosis:   No nutrition diagnosis at this time     Nutrition Interventions:   Food and/or Nutrient Delivery:  Continue Current Diet  Nutrition Education/Counseling:  Education not indicated   Coordination of Nutrition Care:  Continued Inpatient Monitoring    Goals:  continue to consume >/= to 50 %       Nutrition Monitoring and Evaluation:   Food/Nutrient Intake Outcomes:  Food and Nutrient Intake  Physical Signs/Symptoms Outcomes:  Biochemical Data, Constipation, Diarrhea, Fluid Status or Edema, Weight, Skin, Nutrition Focused Physical Findings     Discharge Planning:    No discharge needs at this time     Electronically signed by Kita Harvey RD, ALEJANDRO on 9/25/20 at 11:59 AM EDT    Contact: [phone]

## 2020-09-25 NOTE — CONSULTS
REASON FOR CONSULTATION/CC: Hypertension urgency/emergency      Consult at request of DO Yonathan for hypertension emergency  PCP: Libertad Lemons MD  Established Pulmonologist: Dr. Mariluz Carl: Kerry Coles is a 77y.o. year old female with a history of COPD, hypertension, coronary disease, atrial fibrillation who presents with     She is having her shortness of breath of the last week despite using her medications without significant improvement. She was found to have hypertension emergency with a systolic blood pressure of 240. She was started on Nitropaste. PAST MEDICAL HISTORY:  Past Medical History:   Diagnosis Date    AAA (abdominal aortic aneurysm) ([facility] [address]     pt states it is 4cm    AAA (abdominal aortic aneurysm) without rupture (HCC) 2/10/2015    Atrial fibrillation (HCC)     CAD (coronary artery disease)     CHF (congestive heart failure) (HCC)     COPD (chronic obstructive pulmonary disease) (HCC)     History of blood clots     Hyperlipidemia     Hypertension        PAST SURGICAL HISTORY:  Past Surgical History:   Procedure Laterality Date    ABDOMINAL AORTIC ANEURYSM REPAIR      Endovascular abdominal AA    APPENDECTOMY  1990    incidental    BLADDER SUSPENSION      CATARACT REMOVAL      CHOLECYSTECTOMY  10/15/13    COLONOSCOPY  9/2/07    dr Zuri Rhodes and check in 5 years.  COLONOSCOPY  06/16/2017    ok dr arndt, repeat 5 years   [address]    for benign tumor. just the uterus    JOINT REPLACEMENT  12/2013    right knee replacement    TONSILLECTOMY  as a child    TUMOR EXCISION      benign behind right ear about 2008       FAMILY HISTORY:  family history includes Heart Disease in her father; Hypertension in an other family member. SOCIAL HISTORY:   reports that she quit smoking about 7 years ago. Her smoking use included cigarettes. She started smoking about 43 years ago. She has a 37.00 pack-year smoking history.  She has never used No murmur or rub. No edema. GI: Non-tender. Non-distended. No hernia. Skin: Warm, dry, normal texture and turgor. No nodule on exposed extremities. Lymph: No cervical LAD. No supraclavicular LAD. M/S: No cyanosis. No clubbing. No joint deformity. Neuro: Moves all four extremities. Psych: Oriented x 3. No anxiety. Awake. Alert. Intact judgement and insight. Data Reviewed:   LABS:  CBC:   Recent Labs     09/24/20 1845   WBC 10.6   HGB 12.4   HCT 37.6   MCV 88.2        BMP:   Recent Labs     09/24/20 1845      K 5.0   CL 99   CO2 28   PHOS 3.8   BUN 24*   CREATININE 1.4*     LIVER PROFILE:   Recent Labs     09/24/20 1845   AST 24   ALT 29   BILITOT 0.4   ALKPHOS 82     PT/INR: No results for input(s): PROTIME, INR in the last 72 hours. APTT: No results for input(s): APTT in the last 72 hours. UA:  Recent Labs     09/24/20  1830 09/24/20 1845   COLORU Yellow  --    PHUR 7.0 7.0   WBCUA 0-2  --    RBCUA 0-2  --    BACTERIA 1+*  --    CLARITYU Clear  --    SPECGRAV 1.010  --    LEUKOCYTESUR TRACE*  --    UROBILINOGEN 0.2  --    BILIRUBINUR Negative  --    BLOODU TRACE-LYSED*  --    GLUCOSEU Negative  --      No results for input(s): PHART, XKM4CDO, PO2ART in the last 72 hours. Vent Information  SpO2: 98 %    Radiology Review:  Pertinent images / reports were reviewed as a part of this visit. CT Chest w/ contrast: No results found for this or any previous visit. CT Chest w/o contrast:   Results for orders placed during the hospital encounter of 02/25/20   CT CHEST WO CONTRAST    Narrative EXAMINATION:  CT OF THE CHEST WITHOUT CONTRAST 2/26/2020 1:36 pm    TECHNIQUE:  CT of the chest was performed without the administration of intravenous  contrast. Multiplanar reformatted images are provided for review.  Dose  modulation, iterative reconstruction, and/or weight based adjustment of the  mA/kV was utilized to reduce the radiation dose to as low as reasonably  achievable. COMPARISON:  PA and lateral chest 02/25/2020. CT lung screening 10/28/2019. HISTORY:  ORDERING SYSTEM PROVIDED HISTORY: shortness of breath  TECHNOLOGIST PROVIDED HISTORY:  Reason for exam:->shortness of breath  Reason for Exam: sob, copd, no surg, no ca,  Acuity: Acute  Type of Exam: Initial    FINDINGS:  Mediastinum: Thyroid is mildly to moderately enlarged. Bilateral  well-defined low-density nodules present similar to the prior study. Largest  nodules measure about 10 mm. Few very small stable central mediastinal lymph  nodes. No mass, adenopathy, or pericardial effusion. Heart is mildly  enlarged. Thoracic aorta is normal in size. Prominent coronary  calcifications in the LAD and circumflex. Lungs/pleura: No acute airspace disease or abnormal pulmonary vascular  congestion. Airways are clear. No suspicious pulmonary nodule. Densely  calcified granuloma in the posterior left costophrenic angle. Stable sub 6  mm subpleural nodule posteriorly in the right apex unchanged from the prior  study. Upper Abdomen: Gallbladder surgically absent. Stomach full of ingested food. No lesions seen in the upper liver or either adrenal gland. Beginning of  aortic endograft noted. Soft Tissues/Bones: No acute abnormality. Impression Clear lungs. No acute abnormality. Mild cardiomegaly. Coronary artery  disease. No CHF, interstitial lung disease, or evident bullous changes. CTPA: No results found for this or any previous visit.     CXR PA/LAT:   Results for orders placed during the hospital encounter of 09/24/20   XR CHEST (2 VW)    Narrative EXAMINATION:  TWO XRAY VIEWS OF THE CHEST    9/24/2020 3:32 pm    COMPARISON:  02/25/2020    HISTORY:  ORDERING SYSTEM PROVIDED HISTORY: sob  TECHNOLOGIST PROVIDED HISTORY:  Reason for exam:->sob  Reason for Exam: sob, shakey x 3 weeks  Acuity: Acute  Type of Exam: Initial  Relevant Medical/Surgical History: copd, neg covid

## 2020-09-25 NOTE — PROGRESS NOTES
Discharge instructions reviewed with pt and her . Follow-up appointment made and medication sent to [facility]. No further questions at this time. Pt walked out with  to his car.

## 2020-09-25 NOTE — DISCHARGE SUMMARY
Hospital Medicine Discharge Summary    Patient ID: Ulysses Berg      Patient's PCP: Senait Holm MD    Admit Date: 9/24/2020     Discharge Date:   9/25/2020    Admitting Physician: Eliott Favre, DO     Discharge Physician: Nury Guajardo MD     Discharge Diagnoses: Active Hospital Problems    Diagnosis    PVD (peripheral vascular disease) ([facility] [I73.9]     Priority: High    History of DVT (deep vein thrombosis) [Z86.718]     Priority: High    Sleep apnea [G47.30]     Priority: High    Essential hypertension [I10]     Priority: Medium     Class: Chronic    Hyperlipidemia [E78.5]     Priority: Medium     Class: Chronic    UTI (urinary tract infection) [N39.0]    Hypertensive crisis [I16.9]    CHF (congestive heart failure) ([facility]) [I50.9]    Respiratory failure ([facility] [J96.90]    Morbidly obese ([facility] [E66.01]    PAF (paroxysmal atrial fibrillation) ([facility]) [I48.0]    AAA (abdominal aortic aneurysm) ([facility] [I71.4]    Acute respiratory failure with hypoxia ([facility]) [J96.01]    COPD exacerbation ([facility]) [J44.1]       The patient was seen and examined on day of discharge and this discharge summary is in conjunction with any daily progress note from day of discharge. Hospital Course: 69yo woman with Hx of uncontrolled HTN and anxiety presented with SOB and elevated BP. · Admitted to ICU mainly for hypertensive urgency, no signs of end organ damage. Will increase nifedipine on discharge and cont PTA med regimen otherwise unchanged. · COPD appears stable. Resume PTA regimen of inhalers. Avoid systemic steroid with severe HTN. · UTI ruled out - asymptomatic bacteriuria does not require treatment. · ECHO reassuring.                    Physical Exam Performed:     BP (!) 125/49   Pulse 61   Temp 98.3 °F (36.8 °C) (Oral)   Resp 21   Ht 5' 4\" (1.626 m)   Wt 217 lb 2.5 oz (98.5 kg)   SpO2 96%   BMI 37.27 kg/m²       General appearance:  No apparent distress, appears stated age and cooperative. HEENT:  Normal cephalic, atraumatic without obvious deformity. Pupils equal, round, and reactive to light. Extra ocular muscles intact. Conjunctivae/corneas clear. Neck: Supple, with full range of motion. No jugular venous distention. Trachea midline. Respiratory:  Normal respiratory effort. Clear to auscultation, bilaterally without Rales/Wheezes/Rhonchi. Cardiovascular:  Regular rate and rhythm with normal S1/S2 without murmurs, rubs or gallops. Abdomen: Soft, non-tender, non-distended with normal bowel sounds. Musculoskeletal:  No clubbing, cyanosis or edema bilaterally. Full range of motion without deformity. Skin: Skin color, texture, turgor normal.  No rashes or lesions. Neurologic:  Neurovascularly intact without any focal sensory/motor deficits. Cranial nerves: II-XII intact, grossly non-focal.  Psychiatric:  Alert and oriented, thought content appropriate, normal insight  Capillary Refill: Brisk,< 3 seconds   Peripheral Pulses: +2 palpable, equal bilaterally       Labs: For convenience and continuity at follow-up the following most recent labs are provided:      CBC:    Lab Results   Component Value Date    WBC 8.0 09/25/2020    HGB 13.8 09/25/2020    HCT 42.7 09/25/2020     09/25/2020       Renal:    Lab Results   Component Value Date     09/25/2020    K 4.4 09/25/2020    K 4.9 02/25/2020    CL 98 09/25/2020    CO2 25 09/25/2020    BUN 27 09/25/2020    CREATININE 1.2 09/25/2020    CALCIUM 9.6 09/25/2020    PHOS 3.8 09/24/2020         Significant Diagnostic Studies    Radiology:   XR CHEST (2 VW)   Final Result   No acute abnormality detected. Consults:     IP CONSULT TO CARDIOLOGY    Disposition: home    Condition at Discharge: Stable    Discharge Instructions/Follow-up:  PCP 1 week. BP check.     Code Status:  Full Code     Activity: activity as tolerated    Diet: low Na      Discharge Medications:     Current Discharge Medication List           Details NIFEdipine (ADALAT CC) 60 MG extended release tablet Take 1 tablet by mouth daily  Qty: 30 tablet, Refills: 1              Details   hydrOXYzine (VISTARIL) 25 MG capsule Take 1 capsule by mouth 2 times daily as needed for Anxiety  Qty: 30 capsule, Refills: 0      metoprolol succinate (TOPROL XL) 50 MG extended release tablet Take 1 tablet by mouth daily  Qty: 90 tablet, Refills: 3      rosuvastatin (CRESTOR) 20 MG tablet Take 1 tablet by mouth daily  Qty: 30 tablet, Refills: 11      isosorbide mononitrate (IMDUR) 30 MG extended release tablet TAKE ONE TABLET BY MOUTH DAILY  Qty: 90 tablet, Refills: 5      rivaroxaban (XARELTO) 20 MG TABS tablet TAKE ONE TABLET BY MOUTH DAILY WITH BREAKFAST  Qty: 30 tablet, Refills: 5      albuterol sulfate HFA (PROAIR HFA) 108 (90 Base) MCG/ACT inhaler Inhale 2 puffs into the lungs every 4 hours as needed for Wheezing or Shortness of Breath  Qty: 1 Inhaler, Refills: 11      busPIRone (BUSPAR) 5 MG tablet Take 1 tablet by mouth 2 times daily  Qty: 60 tablet, Refills: 1      Arformoterol Tartrate (BROVANA) 15 MCG/2ML NEBU Take 2 mLs by nebulization 2 times daily  Qty: 120 mL, Refills: 3      hydrALAZINE (APRESOLINE) 50 MG tablet Take 1 tablet by mouth every 8 hours  Qty: 90 tablet, Refills: 3      albuterol (PROVENTIL) (2.5 MG/3ML) 0.083% nebulizer solution Take 3 mLs by nebulization every 4 hours as needed for Wheezing  Qty: 120 mL, Refills: 5    Associated Diagnoses: COPD, severe (HCC)      budesonide (PULMICORT) 0.5 MG/2ML nebulizer suspension Take 2 mLs by nebulization 2 times daily  Qty: 360 mL, Refills: 3    Associated Diagnoses: COPD, severe (HCC)      formoterol (PERFOROMIST) 20 MCG/2ML nebulizer solution Take 2 mLs by nebulization 2 times daily  Qty: 120 mL, Refills: 3    Associated Diagnoses: COPD, severe (HCC)      torsemide (DEMADEX) 20 MG tablet Take 1 tablet by mouth daily  Qty: 30 tablet, Refills: 3      nitroGLYCERIN (NITROSTAT) 0.4 MG SL tablet Place 1 tablet under

## 2020-09-25 NOTE — DISCHARGE INSTR - COC
Continuity of Care Form    Patient Name: Trung Olvera   :  1945  MRN:  [id number]    [address] [place] [address] date:  2020  Discharge date:  ***    Code Status Order: Full Code   Advance Directives:   Advance Care Flowsheet Documentation     Date/Time Healthcare Directive Type of Healthcare Directive Copy in [address] Agent's Name Healthcare Agent's Phone Number    [phone]  No, patient does not have an advance directive for healthcare treatment -- -- -- -- --          Admitting Physician:  Corrina Flynn DO  PCP: Saw Martin MD    Discharging Nurse: [facility] Unit/Room#: P8P-2513/2111-01  Discharging Unit Phone Number: ***    Emergency Contact:   Extended Emergency Contact Information  Primary Emergency Contact: Linda Perez  Address: [address] [facility], [address] [place] [address] Phone: [phone]  Relation: Spouse  Secondary Emergency Contact: Jj Chirinos [address] Phone: [phone]  Relation: Child    Past Surgical History:  Past Surgical History:   Procedure Laterality Date    ABDOMINAL AORTIC ANEURYSM REPAIR      Endovascular abdominal AA    APPENDECTOMY      incidental    BLADDER SUSPENSION      CATARACT REMOVAL      CHOLECYSTECTOMY  10/15/13    COLONOSCOPY  07    dr Yamila Kessler and check in 5 years.  COLONOSCOPY  2017    ok dr arndt, repeat 5 years   [address]    for benign tumor.  just the uterus    JOINT REPLACEMENT  2013    right knee replacement    TONSILLECTOMY  as a child    TUMOR EXCISION      benign behind right ear about        Immunization History:   Immunization History   Administered Date(s) Administered    Influenza Vaccine, unspecified formulation 2018    Pneumococcal Conjugate 13-valent ([id number]) 2017    Pneumococcal Polysaccharide ([id number]) 2015       Active Problems:  Patient Active Problem List   Diagnosis Code    Infection Onset Added Last Indicated Last Indicated By Review Planned Expiration Resolved Resolved By    None active    Resolved    COVID-19 Rule Out 09/24/20 09/24/20 09/24/20 COVID-19 (Ordered)   09/25/20 Bro Irvin RN          Nurse Assessment:  Last Vital Signs: BP (!) 125/49   Pulse 61   Temp 98.3 °F (36.8 °C) (Oral)   Resp 15   Ht 5' 4\" (1.626 m)   Wt 217 lb 2.5 oz (98.5 kg)   SpO2 95%   BMI 37.27 kg/m²     Last documented pain score (0-10 scale): Pain Level: 0  Last Weight:   Wt Readings from Last 1 Encounters:   09/25/20 217 lb 2.5 oz (98.5 kg)     Mental Status:  {IP PT MENTAL STATUS:20030}    IV Access:  { TOMEKA IV ACCESS:373558369}    Nursing Mobility/ADLs:  Walking   {CHP DME BWXA:243529227}  Transfer  {P DME NACN:104195771}  Bathing  {P DME WYBN:456090410}  Dressing  {CHP DME FAZQ:963115614}  Toileting  {CHP DME ZBZV:510708562}  Feeding  {[facility] DME MYHT:949432868}  Med Admin  {[facility] DME XXOX:759586992}  Med Delivery   { TOMEKA MED Delivery:181195366}    Wound Care Documentation and Therapy:        Elimination:  Continence:   · Bowel: {YES / VK:13865}  · Bladder: {YES / WX:98606}  Urinary Catheter: {Urinary Catheter:122150858}   Colostomy/Ileostomy/Ileal Conduit: {YES / FN:26811}       Date of Last BM: ***    Intake/Output Summary (Last 24 hours) at 9/25/2020 1356  Last data filed at 9/25/2020 0900  Gross per 24 hour   Intake 50 ml   Output 3700 ml   Net -3650 ml     I/O last 3 completed shifts:   In: 48 [IV Piggyback:50]  Out: 80 [Urine:2700]    Safety Concerns:     [address] TOMEKA Safety Concerns:011727558}    Impairments/Disabilities:      [address] TOMEKA Impairments/Disabilities:572449508}    Nutrition Therapy:  Current Nutrition Therapy:   [address] Diet List:348783861}    Routes of Feeding: {CHP DME Other Feedings:257672828}  Liquids: {Slp liquid thickness:61703}  Daily Fluid Restriction: {CHP DME Yes amt example:651024540}  Last Modified Barium Swallow with Video (Video Swallowing Test): {Done Not Done [facility]:210320711}    Treatments at the Time of Hospital Discharge:   Respiratory Treatments: ***  Oxygen Therapy:  {Therapy; copd oxygen:46464}  Ventilator:    {MH CC Vent NFJA:682660103}     Heart Failure Instructions for Daily Management  Patient was treated for chronic diastolic heart failure. she  will require the following:     Please weigh daily on the same scale and approximately the same time of day. Report weight gain of 3 pounds/day or 5 pounds/week to : [address] [phone].  Please use hospital discharge weight as baseline reference.  Please monitor for signs and symptoms of and report to MD:  o Worsening Heart Failure: sudden weight gain, shortness of breath, lower extremity or general edema/swelling, abdominal bloating/swelling, inability to lie flat, intolerance to usual activity, or cough (especially at night). Report these finding even if no increase in weight.  o Dehydration:  having difficulty or a decrease in urination, dizziness, worsening fatigue, or new onset/worsening of generalized weakness.  Please continue a LOW SODIUM diet and LIMIT fluid intake to 48 - 64 ounces ( 1.5 - 2 liters) per day. Ardyth Moritz [address] [place] [phone] and/or Chiki Nora Canales @ [phone] with any questions or concerns.  Please continue heart failure education to patient and family/support system.  See After Visit Summary for hospital follow up appointment details.  Consider spiritual care referral for support and/or completion of advance directives [phone].  Consider: [place] [address] telehealth program if patient agreeable and able to participate, palliative care consult for ongoing goals of care, end of life, and/or chronic disease management discussions and referral to [facility] [phone]) once SNF/HHC complete.   Dr Santaigo Paul is pt's primary cardiologist.     Rehab Therapies: {THERAPEUTIC INTERVENTION:4964777909}  Weight Bearing Status/Restrictions: [address] CC Weight Bearin}  Other Medical Equipment (for information only, NOT a DME order):  {EQUIPMENT:973709216}  Other Treatments: ***    Patient's personal belongings (please select all that are sent with patient):  {CHP DME Belongings:156246138}    RN SIGNATURE:  {Esignature:919881514}    CASE MANAGEMENT/SOCIAL WORK SECTION    Inpatient Status Date: ***    Readmission Risk Assessment Score:  Readmission Risk              Risk of Unplanned Readmission:        17           Discharging to Facility/ Agency   · Name:   · Address:  · Phone:  · Fax:    Dialysis Facility (if applicable)   · Name:  · Address:  · Dialysis Schedule:  · Phone:  · Fax:    / signature: {Esignature:677590522}    PHYSICIAN SECTION    Prognosis: {Prognosis:8029811884}    Condition at Discharge: [address] Patient Condition:457211181}    Rehab Potential (if transferring to Rehab): {Prognosis:5743678714}    Recommended Labs or Other Treatments After Discharge: ***    Physician Certification: I certify the above information and transfer of Eladio Ho  is necessary for the continuing treatment of the diagnosis listed and that she requires {Admit to Appropriate Level of Care:66309} for {GREATER/LESS:690338141} 30 days.      Update Admission H&P: {CHP DME Changes in RIQFR:333602505}    PHYSICIAN SIGNATURE:  {Esignature:461929413}

## 2020-09-25 NOTE — ED NOTES
This nurse attempted x 2x to establish 2nd IV. Attempts unsuccessful.       Keanu Rachel RN  09/24/20 0700

## 2020-09-25 NOTE — CARE COORDINATION
INITIAL CASE MANAGEMENT ASSESSMENT    Reviewed chart, met with patient and spouse in the room to assess possible discharge needs. Explained Case Management role/services. Living Situation: confirmed address, lives with spouse in a mobile home with no steps to enter    ADLs: independent     DME: has a shower chair and grab bars, also has a walker and cane but she is not currently using them, she also has a nebulizer PRN    PT/OT Recs: not ordered at this time, patient denies needs as she is able to ambulate independently and  will assist at home if needed     Active Services: none     Transportation: active , spouse will transport home     Medications: confirmed Medicare and [facility], no issues obtaining medications at [facility] on [address]    PCP: Luke Floyd MD      HD/PD: N/A    PLAN/COMMENTS: Plan is to return home with spouse, denies D/C needs. CM provided contact information for patient or family to call with any questions. CM will follow and assist as needed.     Electronically signed by Jennifer Kinsey RN on 9/25/2020 at 1:53 PM

## 2020-09-25 NOTE — ED NOTES
Pt states she's feeling better after receiving breathing treatments. Pt up to bedside commode every 15-20 minutes. IV ATB therapy initiated. No adverse reactions noted. Pt's Blood pressure responding to Metoprolol and Nitro paste. Per Dr. Naomi Blake, ok to not start drip.       Long Castellon RN  09/24/20 2204

## 2020-09-25 NOTE — H&P
blood pressures were consistently elevated in the 200s but patient was not having any major symptoms to suggest hypertensive urgency. Past Medical History:        Diagnosis Date    AAA (abdominal aortic aneurysm) ([facility] [address]     pt states it is 4cm    AAA (abdominal aortic aneurysm) without rupture (HCC) 2/10/2015    Atrial fibrillation (HCC)     CAD (coronary artery disease)     CHF (congestive heart failure) ([facility] [address]     COPD (chronic obstructive pulmonary disease) (HCC)     History of blood clots     Hyperlipidemia     Hypertension    · Sleep apnea    Past Surgical History:        Procedure Laterality Date    ABDOMINAL AORTIC ANEURYSM REPAIR      Endovascular abdominal AA    APPENDECTOMY  1990    incidental    BLADDER SUSPENSION      CATARACT REMOVAL      CHOLECYSTECTOMY  10/15/13    COLONOSCOPY  9/2/07    dr Sharri Cheng and check in 5 years.  COLONOSCOPY  06/16/2017    ok dr arndt, repeat 5 years   [address]    for benign tumor. just the uterus    JOINT REPLACEMENT  12/2013    right knee replacement    TONSILLECTOMY  as a child    TUMOR EXCISION      benign behind right ear about 2008       Medications Prior to Admission:    Prior to Admission medications    Medication Sig Start Date End Date Taking?  Authorizing Provider   hydrOXYzine (VISTARIL) 25 MG capsule Take 1 capsule by mouth 2 times daily as needed for Anxiety 9/18/20  Yes Kathy Swann MD   metoprolol succinate (TOPROL XL) 50 MG extended release tablet Take 1 tablet by mouth daily 9/16/20  Yes Tona Wade MD   rosuvastatin (CRESTOR) 20 MG tablet Take 1 tablet by mouth daily 8/13/20  Yes Temo Rinaldi MD   isosorbide mononitrate (IMDUR) 30 MG extended release tablet TAKE ONE TABLET BY MOUTH DAILY 7/27/20  Yes Lacy Koch MD   rivaroxaban (XARELTO) 20 MG TABS tablet TAKE ONE TABLET BY MOUTH DAILY WITH BREAKFAST 7/23/20  Yes Nikki Lewis, DO   albuterol sulfate HFA (PROAIR HFA) 108 (90 Base) MCG/ACT inhaler Inhale 2 puffs into the lungs every 4 hours as needed for Wheezing or Shortness of Breath 7/23/20  Yes Anne Trinidad DO   busPIRone (BUSPAR) 5 MG tablet Take 1 tablet by mouth 2 times daily 3/13/20  Yes Hailee Moran MD   Arformoterol Tartrate [facility]) 15 MCG/2ML NEBU Take 2 mLs by nebulization 2 times daily 2/29/20  Yes Jocelyn Lane MD   hydrALAZINE (APRESOLINE) 50 MG tablet Take 1 tablet by mouth every 8 hours 2/29/20  Yes Jocelyn Lane MD   albuterol (PROVENTIL) (2.5 MG/3ML) 0.083% nebulizer solution Take 3 mLs by nebulization every 4 hours as needed for Wheezing 2/17/20  Yes Anne Trinidad DO   budesonide (PULMICORT) 0.5 MG/2ML nebulizer suspension Take 2 mLs by nebulization 2 times daily 7/24/19  Yes Anne Trinidad DO   formoterol (PERFOROMIST) 20 MCG/2ML nebulizer solution Take 2 mLs by nebulization 2 times daily 7/24/19  Yes Anne Trinidad DO   NIFEdipine (ADALAT CC) 30 MG extended release tablet Take 1 tablet by mouth daily  Patient not taking: Reported on 9/18/2020 2/29/20   Cheikh Gonsalves MD   torsemide (DEMADEX) 20 MG tablet Take 1 tablet by mouth daily  Patient not taking: Reported on 9/18/2020 3/1/20   Cheikh Gonsalves MD   nitroGLYCERIN (NITROSTAT) 0.4 MG SL tablet Place 1 tablet under the tongue every 5 minutes as needed for Chest pain 3/5/19   Hailee Moran MD   Multiple Vitamins-Minerals (MULTI FOR HER 50+ PO) Take 1 tablet by mouth daily    Historical Provider, MD       Allergies:  Morphine and Other    Social History:  The patient currently lives home    TOBACCO:   reports that she quit smoking about 7 years ago. Her smoking use included cigarettes. She started smoking about 43 years ago. She has a 37.00 pack-year smoking history. She has never used smokeless tobacco.  ETOH:   reports no history of alcohol use. Family History:  Reviewed in detail and negative for DM, Early CAD, Cancer, CVA.  Positive as follows:        Problem Relation Age of Onset    Heart Disease Father     Hypertension Other        REVIEW OF SYSTEMS:    as noted in the HPI. All other systems reviewed and negative. PHYSICAL EXAM:    BP (!) 172/81   Pulse 98   Temp 98.1 °F (36.7 °C) (Oral)   Resp 18   Ht 5' 4\" (1.626 m)   Wt 217 lb 2.5 oz (98.5 kg)   SpO2 98%   BMI 37.27 kg/m²     General appearance: No apparent distress appears stated age and cooperative. HEENT Normal cephalic, atraumatic without obvious deformity. Pupils equal, round, and reactive to light. Extra ocular muscles intact. Conjunctivae/corneas clear. Moist mucous membranes  Neck: Supple, no JVD  Lungs: Minimal wheezing throughout all lung fields, no crackles  Heart: Regular rate and rhythm with Normal S1/S2 without murmurs, rubs or gallops, point of maximum impulse non-displaced  Abdomen: Soft, nontender, nondistended, active bowel sounds  Extremities: 1+ pitting edema bilaterally to lower extremities  Skin: No rashes  Neurologic: Alert and oriented X 3, neurovascularly intact with sensory/motor intact upper extremities/lower extremities, bilaterally. Cranial nerves: II-XII intact, grossly non-focal.  Mental status: Alert, oriented, thought content appropriate. Capillary Refill: Acceptable  < 3 seconds  Peripheral Pulses: +3 Easily felt, not easily obliterated with pressure      CXR: No acute abnormality detected    CBC   Recent Labs     09/24/20  1845   WBC 10.6   HGB 12.4   HCT 37.6         RENAL  Recent Labs     09/24/20  1845      K 5.0   CL 99   CO2 28   PHOS 3.8   BUN 24*   CREATININE 1.4*     LFT'S  Recent Labs     09/24/20  1845   AST 24   ALT 29   BILITOT 0.4   ALKPHOS 82     COAG  No results for input(s): INR in the last 72 hours.   CARDIAC ENZYMES  Recent Labs     09/24/20  1845   TROPONINI <0.01       U/A:    Lab Results   Component Value Date    NITRITE neg 07/19/2014    COLORU Yellow 09/24/2020    WBCUA 0-2 09/24/2020    RBCUA 0-2 09/24/2020    BACTERIA 1+ 09/24/2020 CLARITYU Clear 09/24/2020    SPECGRAV 1.010 09/24/2020    LEUKOCYTESUR TRACE 09/24/2020    BLOODU TRACE-LYSED 09/24/2020    GLUCOSEU Negative 09/24/2020       ABG    Lab Results   Component Value Date    ZJE7FWU 26.0 06/04/2015    BEART 1.5 06/04/2015    H4KJPBBX 96.0 06/04/2015    PHART 7.399 06/04/2015    SLH0JPP 42.9 06/04/2015    PO2ART 78.5 06/04/2015    AOP1LBU 27.3 06/04/2015           Active Hospital Problems    Diagnosis Date Noted    Hypertensive crisis [I16.9] 09/24/2020     Priority: High    COPD exacerbation (HCC) [J44.1]      Priority: High    Essential hypertension [I10] 09/09/2011     Priority: High     Class: Chronic    Sleep apnea [G47.30] 09/09/2011     Priority: High    UTI (urinary tract infection) [N39.0] 09/25/2020     Priority: Medium    CHF (congestive heart failure) ([facility] [address] [I50.9] 09/24/2020     Priority: Medium    Respiratory failure ([facility] [address] [J96.90] 09/24/2020    Morbidly obese ([facility] [address] [E66.01] 01/03/2020    PAF (paroxysmal atrial fibrillation) ([facility]) [I48.0]     AAA (abdominal aortic aneurysm) ([facility] [address] [I71.4] 03/21/2018    PVD (peripheral vascular disease) ([facility]) [I73.9]     Acute respiratory failure with hypoxia ([facility]) [J96.01]     History of DVT (deep vein thrombosis) [Z86.718] 05/08/2013    Hyperlipidemia [E78.5] 09/09/2011     Class: Chronic         PHYSICIANS CERTIFICATION:    I certify that Lorri Gonzalez is expected to be hospitalized for greater than 2 midnights based on the following assessment and plan:      ASSESSMENT/PLAN:  · Admitted to ICU mainly for hypertensive crisis, initially blood pressure seem to be improving, ordered PRN IV labetalol as well as nitroglycerin drip for concern that patient could develop pulmonary edema from the hypertension  · Treating for COPD exacerbation with Zithromax, DuoNebs, restart home breathing medications, Solu-Medrol 60 every 12 IV  · Treating for UTI with Rocephin  · Blood cultures pending  · Ordering transthoracic echo for further evaluation of heart failure, cardiology consult for heart failure although symptoms seem to be more appropriately associated with patient's COPD exacerbation as well as noncompliance with DENISE  · Ordered home CPAP/BiPAP therapy for DENISE      DVT Prophylaxis: Home Xarelto  Diet: DIET LOW SODIUM 2 GM; Carb Control: 4 carb choices (60 gms)/meal; 1500 ml  Code Status: Full Code  PT/OT Eval Status:  ambulatory    Dispo -pending clinical course       Corrina Flynn, DO    Thank you Saw Martin MD for the opportunity to be involved in this patient's care. If you have any questions or concerns please feel free to contact me at [phone].

## 2020-09-26 ENCOUNTER — CARE COORDINATION (OUTPATIENT)
Dept: CASE MANAGEMENT | Age: 75
End: 2020-09-26

## 2020-09-26 NOTE — CONSULTS
[address]                                  CONSULTATION    PATIENT NAME: Jj Sprague                     :        1945  MED REC NO:   [id number]                          ROOM:       2111  ACCOUNT NO:   [de-identified]                           ADMIT DATE: 2020  PROVIDER:     Milad Kimbrough MD    CARDIOLOGY CONSULTATION    CONSULT DATE:  2020    HISTORY OF PRESENT ILLNESS:  This is a 71-year-old female with a history  of hypertension, paroxysmal atrial fibrillation, heart failure, aortic  aneurysm, hyperlipidemia presented to the hospital with symptoms of  increasing shortness of breath for about three, four days. She also  felt that she was feeling quite shaky. She denied any chest tightness  or pressure. She has also noticed increased swelling of her lower  extremity. She was tested for COVID and was negative. Since her  symptoms persisted and worsened, she presented to the emergency room. In the emergency room, she was found to be significantly hypertensive  and she was admitted to the hospital.  She apparently was on a number of  blood pressure medicines including Procardia which she has stopped  taking for unknown reason. She has been started back on her medicines. She was also on nitro drip which has been discontinued and she feels  much better this afternoon. REVIEW OF SYSTEMS:  Please see HPI. PAST MEDICAL HISTORY:  1. History of paroxysmal atrial fibrillation. 2.  History of hypertension. 3.  Hyperlipidemia. 4.  History of aortic aneurysm repair. PAST SURGICAL HISTORY:  She had appendectomy, bladder suspension,  cataract removal, cholecystectomy, endovascular abdominal aortic  aneurysm repair, hysterectomy. SOCIAL HISTORY:  She lives with her . She had a previous history  of smoking but quit about seven years ago. Denies any alcohol usage.     FAMILY

## 2020-09-28 ENCOUNTER — CARE COORDINATION (OUTPATIENT)
Dept: CASE MANAGEMENT | Age: 75
End: 2020-09-28

## 2020-09-28 LAB
BLOOD CULTURE, ROUTINE: ABNORMAL
BLOOD CULTURE, ROUTINE: ABNORMAL
ORGANISM: ABNORMAL

## 2020-09-28 PROCEDURE — 1111F DSCHRG MED/CURRENT MED MERGE: CPT

## 2020-09-28 NOTE — PROGRESS NOTES
[facility]  Office Visit    Mindy Loredo  1945    September 30, 2020    CC:   Chief Complaint   Patient presents with    Follow-Up from Hospital     SOB     HPI:  The patient is 77 y.o. female with a past medical history significant for CAD, atrial fib, HTN, abdominal aortic aneurysm (endovascular repair in 3/2018), chronic diastolic HF, sleep apnea and COPD who is here for hospital follow up. She was hospitalized from 9/24/2020-9/25/2020 for increased dyspnea and uncontrolled HTN. She was not seen by cardiology at that time. Here for follow up. She was seen by Dr. Guerda Duggan in the office on 9/14/2020 in regards to ablative therapy for her atrial fib. She is scheduled for this in October. She was hospitalized in early 2018 with SOB and chest pain and dx with acute on chonic diastolic HF and atrial fib. Her troponins were elevated, however given her sepsis, further cardiac evaluation was suspended until she was over the flu. She had outpatient stress testing which was positive for ischemia and underwent cardiac cath in 3/208 revealed  of the RCA and nonobstructive disease of the LAD and LCX. She also underwent endovascular AAA repair on 3/12/2018 (Dr. Linda Erazo). Overall feeling okay, but continues to have SOB. BP has been doing better at home: SBP running 130-140/70's; SBP on occasion increases to 160-170's. Sleeps chronically elevated to ease her breathing. She has CPAP but has not used it d/t intolerance. Denies chest pain/discomfort, PND, palpitations, dizziness, syncope, edema , weight change or claudication. + chronic cough productive clear phlegm. Using inhalers and nebulizers with improved SOB. Did not qualify for O2 yet (per pulmonary MD-Dr. Jazlyn Hill). Monitoring sodium and fluid intake. Attempts to ride stationary bike daily. Review of Systems:  Constitutional: Denies  fatigue, weakness, night sweats or fever/chills.    HEENT: Denies new visual changes, ringing in ears, nosebleeds,nasal congestion  Respiratory: + SOB but recently improved after hospital visit. + chronic cough  Cardiovascular: see HPI  GI: Denies N/V, diarrhea, constipation, abdominal pain, change in bowel habits, melena or hematochezia  : Denies urinary frequency, urgency, incontinence, hematuria or dysuria. Skin: Denies rash, hives, or cyanosis  Musculoskeletal: Denies joint/muscle aches or pains  Neurological: Denies syncope or TIA-like symptoms. Psychiatric: Denies anxiety, insomnia or depression     Past Medical History:   Diagnosis Date    AAA [facility]abdominal aortic [facility]) [facility]     pt states it is 4cm    AAA (abdominal aortic aneurysm) without rupture (HCC) 2/10/2015    Atrial fibrillation (HCC)     CAD (coronary artery disease)     CH[facility]estive heart failure) [facility]     [facility]     History of blood clots     Hyperlipidemia     Hypertension      Past Surgical History:   Procedure Laterality Date    ABDOMINAL AORTIC ANEURYSM REPAIR      Endovascular abdominal AA    APPENDECTOMY      incidental    BLADDER SUSPENSION      CATARACT REMOVAL      CHOLECYSTECTOMY  10/15/13    COLONOSCOPY  07    dr Afton Nyhan and check in 5 years.  COLONOSCOPY  2017    ok dr arndt, repeat 5 years   31 [facility]    for benign tumor.  just the uterus    JOINT REPLACEMENT  2013    right knee replacement    TONSILLECTOMY  as a child    TUMOR EXCISION      benign behind right ear about      Family History   Problem Relation Age of Onset    Heart Disease Father     Hypertension Other      Social History     Tobacco Use    Smoking status: Former Smoker     Packs/day: 1.00     Years: 37.00     Pack years: 37.00     Types: Cigarettes     Start date: 1976     Last attempt to quit: 2013     Years since quittin.0    Smokeless tobacco: Never Used    Tobacco comment: E cigarette now and then   Substance Use Topics    Alcohol use: No    Drug use: No       Allergies   Allergen Reactions    Morphine Rash     rash    Other Rash     Current Outpatient Medications   Medication Sig Dispense Refill    NIFEdipine (ADALAT CC) 60 MG extended release tablet Take 1 tablet by mouth daily 30 tablet 1    hydrOXYzine (VISTARIL) 25 MG capsule Take 1 capsule by mouth 2 times daily as needed for Anxiety 30 capsule 0    rosuvastatin (CRESTOR) 20 MG tablet Take 1 tablet by mouth daily 30 tablet 11    isosorbide mononitrate (IMDUR) 30 MG extended release tablet TAKE ONE TABLET BY MOUTH DAILY 90 tablet 5    rivaroxaban (XARELTO) 20 MG TABS tablet TAKE ONE TABLET BY MOUTH DAILY WITH BREAKFAST 30 tablet 5    albuterol sulfate HFA (PROAIR HFA) 108 (90 Base) MCG/ACT inhaler Inhale 2 puffs into the lungs every 4 hours as needed for Wheezing or Shortness of Breath 1 Inhaler 11    busPIRone (BUSPAR) 5 MG tablet Take 1 tablet by mouth 2 times daily 60 tablet 1    Arformoterol Tartrate (BROVANA) 15 MCG/2ML NEBU Take 2 mLs by nebulization 2 times daily 120 mL 3    hydrALAZINE (APRESOLINE) 50 MG tablet Take 1 tablet by mouth every 8 hours 90 tablet 3    torsemide (DEMADEX) 20 MG tablet Take 1 tablet by mouth daily 30 tablet 3    albuterol (PROVENTIL) (2.5 MG/3ML) 0.083% nebulizer solution Take 3 mLs by nebulization every 4 hours as needed for Wheezing 120 mL 5    budesonide (PULMICORT) 0.5 MG/2ML nebulizer suspension Take 2 mLs by nebulization 2 times daily 360 mL 3    formoterol (PERFOROMIST) 20 MCG/2ML nebulizer solution Take 2 mLs by nebulization 2 times daily 120 mL 3    nitroGLYCERIN (NITROSTAT) 0.4 MG SL tablet Place 1 tablet under the tongue every 5 minutes as needed for Chest pain 25 tablet 1    Multiple Vitamins-Minerals (MULTI FOR HER 50+ PO) Take 1 tablet by mouth daily      metoprolol succinate (TOPROL XL) 50 MG extended release tablet Take 1 tablet by mouth daily 90 tablet 3     No current facility-administered medications for this visit. Physical Exam:   /78   Pulse 68   Temp 97.5 °F (36.4 °C)   Ht 5' 4\" (1.626 m)   Wt 214 lb (97.1 kg)   SpO2 97%   BMI 36.73 kg/m²   Wt Readings from Last 2 Encounters:   09/30/20 214 lb (97.1 kg)   09/25/20 217 lb 2.5 oz (98.5 kg)     Constitutional: She is oriented to person, place, and time. She appears well-developed and well-nourished. In no acute distress. HEENT: Normocephalic and atraumatic. Sclerae anicteric. No xanthelasmas. EOM's intact. Neck: Supple. No JVD present. Carotids without bruits. No  thyromegaly present. No lymphadenopathy present. Cardiovascular: RRR, normal S1 and S2; soft late systolic murmur  Pulmonary/Chest: Effort normal.  Lungs clear to auscultation. Chest wall nontender  Abdominal: soft, nontender, nondistended. + bowel sounds; no hepatomegaly   Extremities: No edema, cyanosis, or clubbing. Pulses are 2+ radial/DP/PT bilaterally. Cap refill brisk. Neurological: No focal deficit. Skin: Skin is warm and dry. Psychiatric: She has a normal mood and affect. Her speech is normal and behavior is normal.     Lab Review:   Lab Results   Component Value Date    TRIG 118 05/23/2019    HDL 41 05/23/2019    HDL 38 09/09/2011    LDLCALC 90 05/23/2019    LDLDIRECT 111 09/06/2012    LABVLDL 24 05/23/2019     Lab Results   Component Value Date     09/25/2020    K 4.4 09/25/2020    K 4.9 02/25/2020    CL 98 09/25/2020    CO2 25 09/25/2020    BUN 27 09/25/2020    CREATININE 1.2 09/25/2020    GLUCOSE 131 09/25/2020    GLUCOSE 102 07/14/2016    CALCIUM 9.6 09/25/2020      Lab Results   Component Value Date    WBC 8.0 09/25/2020    HGB 13.8 09/25/2020    HCT 42.7 09/25/2020    MCV 88.1 09/25/2020     09/25/2020     Echo 9/25/2020:  Concentric LVH with normal LV size and wall motion. EF is    60%. Grade II diastolic dysfunction with elevated LV filling pressures. Trivial mitral regurgitation is present. The left atrium is severely dilated.   Mild aortic regurgitation. Right ventricular systolic function is normal .  Trivial tricuspid regurgitation. 2/25/2020 R cardiac cath:  1.  _____ normal right cardiac pressure. 2.  Elevated pulmonary capillary wedge pressure with a mean pulmonary  capillary wedge of 29 mmHg. 3.  Normal cardiac output and indices. 4.  No oxygen step off to suggest ASD/PFO.    Echo 1/2/2020: There is moderate concentric left ventricular hypertrophy. Patient appears to be in atrial fibrillation. Ejection fraction is estimated to be 50-60%. Indeterminate diastolic function. Mild aortic regurgitation. Mild tricuspid regurgitation. Mild mitral regurgitation with mitral annular calcification    2/20/2019 Echo:  Mod. conc. LVH: EF 65%. No regional wall motion abnormalities are noted. Trivial mitral regurgitation. The left atrium is mildly dilated. Mild aortic regurgitation. Normal right ventricular size and function. Trivial tricuspid regurgitation. 8/16/2019 VL of aorta/IVC/iliac  Patent endovascular stent graft    No evidence of endoleak    Decreased diameter of the aortic aneurysm sac.    No changes when compared to the previous duplex scan of 2/15/2019         3/15/2019 Carotid US:  No hemodynamically significant carotid stenosis noted on right side.    Tortuous right common and internal carotid arteries        Moderate left internal carotid artery stenosis 50-79% by velocity (220/63    cm/s in proximal ICA).      Normal bilateral vertebral artery blood flow     3/2018 Cardiac cath:   of RCA and nonobstructive disease of LAD and LCX; normal LV function    2/19/2018 Lexiscan-Myoview:  Abnormal study. There is a moderate sized, mild intensity, partially    reversible defect of the mid to apical anterior and mid anterolateral walls    which is consistent with ischemia.  There is breast attenuation but stress    images appear worse.    Normal LV size and systolic function.    Overall findings represent an intermediate risk study. [facility]: ([facility]) 4/2017   of RCA chronic LAD 10-20% RA 4 PA24/9 16 wedge 9 LVEDP markedly elevated 30     Carotid US 10/2017 at [facility]:  1. Estimated diameter reduction of the right internal carotid artery is  less than 50%. 2.  Estimated diameter reduction of the left internal carotid artery is  50-69%. 3.  The bilateral common carotid arteries reveal no evidence of  significant stenosis. 4.  There is greater than 50% stenosis of the right external carotid  artery. 5.  There is no evidence of significant stenosis in the left external  carotid artery. 6.  The bilateral vertebral arteries are patent with antegrade flow. 7.  The bilateral subclavian arteries reveal no evidence of significant  stenosis. 8.  There has been no significant change from the previous study of  4/21/2017.       Assessment:    1. Essential hypertension  -improving and better controlled  -recently hospitalized with uncontrolled HTN and meds adjusted  -goal BP < 130/80  -continue medical management    2. SOB (shortness of breath)  -chronic and stable  -largely d/t COPD +/- PAF  -does not appear to be volume overloaded  -continue torsemide    3. Paroxysmal atrial fibrillation (HCC)  -on Xarelto  -continue Toprol  -scheduled for atrial fib ablation in October 2020  -[id number] Vasc score 7    4. Chronic diastolic heart failure (HCC)  -recent echo with preserved LVEF (9/2020) with grade II diastolic dysfunction; NYHA class hard to ascertain as she has SOB largely d/t her COPD  -appears to euvolemic on exam  -continue torsemide, hydralazine, imdur and Toprol  -low sodium diet and fluid restriction    5. Coronary artery disease involving native coronary artery of native heart without angina pectoris  -known  of RCA; nonobstructive disease in LAD and circ from cath in 4/2017  -no angina  -continue medical management with BB and statin  -not on ASA d/t Xarelto (she declines to take both)    6.  AAA (abdominal aortic aneurysm) without rupture ([facility]  -S/P endovascular repair in 3/2018  -follows with Dr. Odin Rosa    7. Carotid artery stenosis without cerebral infarction, bilateral  -follows vascular  -47-95% LICA on US in 6/8882  -asymptomatic    8. Chronic obstructive pulmonary disease, unspecified COPD type ([facility]  -follows with Dr. Gretchen Jessica    9. Hyperlipidemia  -on statin     Plan:  Labs from 9/24 and 9/25/2020 reviewed  Test results from recent hospital stay reviewed  Continue hydralazine, imdur, Toprol, nifedipine, Xarelto, statin, torsemide  Emphasized and discussed low-fat/low sodium diet, monitoring of daily weights, fluid restriction, worsening signs and symptoms of heart failure and when to call, and the importance of regular exercise and activity. Atrial fib scheduled for October 2020  Continue to monitor BP: will probably further adjustment in meds as outpt. She has been advised to call if SBP remains > 140  Follow up with Dr. Edwige Lee as scheduled in January 2021 or sooner if needed    Return for as scheduled with Dr. Edwige Lee in January 2021. Thanks for allowing me to participate in the care of this patient.       Mundo Walton, APRN-CNP  [address], [address] [place] [address]) [place], [facility] [address]  Office: [phone]  Fax: [phone]

## 2020-09-28 NOTE — CARE COORDINATION
[facility] Transitions Initial Follow Up Call    Call within 2 business days of discharge: Yes    Patient: Darshana Jackson Patient : 1945   MRN: [id number]  Reason for Admission: COPD exacerbation  Discharge Date: 20 RARS: Readmission Risk Score: 17      Last Discharge [facility]       Complaint Diagnosis Description Type Department Provider    20 Shortness of Breath COPD exacerbation ([address] . .. ED to Hosp-Admission (Discharged) (ADMITTED) [facility] ICU Ronal Pulliam MD; Olivia Thomas. .. Challenges to be reviewed by the provider   Additional needs identified to be addressed with provider No  none    Discussed COVID-19 related testing which was not done at this time. Test results were not done. Patient informed of results, if available? Not done with admission. COVID testing was done prior to admission - negative results - patient aware. Method of communication with provider : none    Advance Care Planning:   Does patient have an Advance Directive:  decision maker updated. Was this a readmission? No    Care Transition Nurse (CTN) contacted the patient by telephone to perform post hospital discharge assessment. Verified name and  with patient as identifiers. Provided introduction to self, and explanation of the CTN role. CTN reviewed discharge instructions, medical action plan and red flags with patient who verbalized understanding. Patient given an opportunity to ask questions and does not have any further questions or concerns at this time. Were discharge instructions available to patient? Yes. Reviewed appropriate site of care based on symptoms and resources available to patient including: PCP and Specialist. The patient agrees to contact the PCP office for questions related to their healthcare. Medication reconciliation was performed with patient, who verbalizes understanding of administration of home medications.  Advised obtaining a 90-day supply of all daily and as-needed medications. Covid Risk Education    Patient has following risk factors of: heart failure and COPD. Education provided regarding infection prevention, and signs and symptoms of COVID-19 and when to seek medical attention with patient who verbalized understanding. Discussed exposure protocols and quarantine From CDC: Are you at higher risk for severe illness?   and given an opportunity for questions and concerns. The patient agrees to contact the COVID-19 hotline [phone] or PCP office for questions related to COVID-19. For more information on steps you can take to protect yourself, see CDC's How to Protect Yourself     Patient/family/caregiver given information for [employer] Loop and agrees to enroll no  Patient's preferred e-mail: declines  Patient's preferred phone number: declines    Discussed follow-up appointments. If no appointment was previously scheduled, appointment scheduling offered: Yes. Is follow up appointment scheduled within 7 days of discharge? Yes  Non-[facility] follow up appointment(s): N/A    Plan for follow-up call in 7-10 days based on severity of symptoms and risk factors. Plan for next call: symptom management-COPD  CTN provided contact information for future needs. Non-face-to-face services provided:  Obtained and reviewed discharge summary and/or continuity of care documents  Assessment and support for treatment adherence and medication management-medication list reviewed & 1111f completed.     Care Transitions 24 Hour Call    Do you have any ongoing symptoms?:  No  Do you have a copy of your discharge instructions?:  Yes  Do you have all of your prescriptions and are they filled?:  Yes  Have you been contacted by a [facility] Pharmacist?:  No  Have you scheduled your follow up appointment?:  Yes  How are you going to get to your appointment?:  Car - family or friend to transport  Were you discharged with any Home Care or Post Acute Services:  No  Do you feel like you have everything you need to keep you well at home?:  Yes  Care Transitions Interventions       2nd attempt at CTN/COVID 19 monitoring discharge phone call. Spoke with [facility]. She states she is doing \"good\". [facility] states her  will provide her transportation to her follow up appointments. [facility] states her weight today = 213lbs. She denies any SOB, chest pain, or edema. The [facility] states she follows a \"salt free and low fat\" diet. She states she monitors/restricts her fluid intake daily. Discussed the heart failure zones. The Kodi places herself in the \"green\" zone today. The [facility]odi states she has a hand held nebulizer at home - she uses it as needed. The [facility] states she has used it 1 time since she returned home.     Follow Up  Future Appointments   Date Time Provider Kiana Puri   9/28/2020  1:30 PM MARIA T Garcia PFT  1 [facility] PFT [facility]   9/30/2020 10:40 AM CHRISTINE Gómez - CNP [facility]   10/1/2020 11:15 AM SCHEDULE, [facility]   11/2/2020 10:00 AM DO DAQUAN Lebron CC [facility]   1/11/2021  1:30 PM Annalise Glez MD [facility] PC [facility]   1/29/2021 11:00 AM Dejuan Kumar MD [facility]       Sameera Richmond RN

## 2020-09-29 LAB — CULTURE, BLOOD 2: NORMAL

## 2020-09-30 ENCOUNTER — OFFICE VISIT (OUTPATIENT)
Dept: CARDIOLOGY CLINIC | Age: 75
End: 2020-09-30
Payer: MEDICARE

## 2020-09-30 VITALS
HEIGHT: 64 IN | DIASTOLIC BLOOD PRESSURE: 78 MMHG | WEIGHT: 214 LBS | TEMPERATURE: 97.5 F | SYSTOLIC BLOOD PRESSURE: 130 MMHG | OXYGEN SATURATION: 97 % | HEART RATE: 68 BPM | BODY MASS INDEX: 36.54 KG/M2

## 2020-09-30 PROCEDURE — 1090F PRES/ABSN URINE INCON ASSESS: CPT | Performed by: NURSE PRACTITIONER

## 2020-09-30 PROCEDURE — 3017F COLORECTAL CA SCREEN DOC REV: CPT | Performed by: NURSE PRACTITIONER

## 2020-09-30 PROCEDURE — G8427 DOCREV CUR MEDS BY ELIG CLIN: HCPCS | Performed by: NURSE PRACTITIONER

## 2020-09-30 PROCEDURE — 1036F TOBACCO NON-USER: CPT | Performed by: NURSE PRACTITIONER

## 2020-09-30 PROCEDURE — 1123F ACP DISCUSS/DSCN MKR DOCD: CPT | Performed by: NURSE PRACTITIONER

## 2020-09-30 PROCEDURE — G8417 CALC BMI ABV UP PARAM F/U: HCPCS | Performed by: NURSE PRACTITIONER

## 2020-09-30 PROCEDURE — G8399 PT W/DXA RESULTS DOCUMENT: HCPCS | Performed by: NURSE PRACTITIONER

## 2020-09-30 PROCEDURE — 4040F PNEUMOC VAC/ADMIN/RCVD: CPT | Performed by: NURSE PRACTITIONER

## 2020-09-30 PROCEDURE — 1111F DSCHRG MED/CURRENT MED MERGE: CPT | Performed by: NURSE PRACTITIONER

## 2020-09-30 PROCEDURE — 99214 OFFICE O/P EST MOD 30 MIN: CPT | Performed by: NURSE PRACTITIONER

## 2020-10-05 ENCOUNTER — OFFICE VISIT (OUTPATIENT)
Dept: PRIMARY CARE CLINIC | Age: 75
End: 2020-10-05
Payer: MEDICARE

## 2020-10-05 ENCOUNTER — TELEPHONE (OUTPATIENT)
Dept: CARDIOLOGY CLINIC | Age: 75
End: 2020-10-05

## 2020-10-05 PROCEDURE — 99211 OFF/OP EST MAY X REQ PHY/QHP: CPT | Performed by: NURSE PRACTITIONER

## 2020-10-05 PROCEDURE — G8417 CALC BMI ABV UP PARAM F/U: HCPCS | Performed by: NURSE PRACTITIONER

## 2020-10-05 PROCEDURE — G8428 CUR MEDS NOT DOCUMENT: HCPCS | Performed by: NURSE PRACTITIONER

## 2020-10-05 NOTE — TELEPHONE ENCOUNTER
According to Dr. Tj Esqueda note   \"We will hold Xarelto  for 12 hours prior to procedure\"  Please call patient to make her aware.

## 2020-10-05 NOTE — TELEPHONE ENCOUNTER
Michael Grey called in this afternoon to ask a question about her Ablation scheduled on 10/07 with Dr. Dayan Clifford. She needs to know if he wants her to hold any of her blood thinner medications?      Michael Grey can be reached at [phone]

## 2020-10-05 NOTE — TELEPHONE ENCOUNTER
She should speak to the physician performing the procedure and see what the recommendation is for holding medication then let our office know.

## 2020-10-05 NOTE — PROGRESS NOTES
Patient presented to [facility] drive up clinic for preop testing. Patient was swabbed and given information advising them to remain isolated until procedure date.

## 2020-10-06 LAB — SARS-COV-2, NAA: NOT DETECTED

## 2020-10-06 NOTE — RESULT ENCOUNTER NOTE
Your test for COVID-19, also known as novel coronavirus, came back negative/not detected. No virus was detected from the sample collected. Testing is not 100%. Until your symptoms are fully resolved, you may still be contagious. We recommend that you remain isolated for 7 days minimum, or 24-48 hours after your symptoms have completely resolved, whichever is longer. If you were exposed to a known positive COVID-19 patient, then you must remain quarantined for 14 days. If you were tested for an upcoming procedure, then you should remain in quarantine until your procedure. Continually monitor symptoms. Contact a medical provider if symptoms are worsening. If you have any additional questions, contact your PCP.     For additional information, please visit the Centers for Disease Control and Prevention [email or web address]

## 2020-10-07 ENCOUNTER — ANESTHESIA (OUTPATIENT)
Dept: CARDIAC CATH/INVASIVE PROCEDURES | Age: 75
DRG: 274 | End: 2020-10-07
Payer: MEDICARE

## 2020-10-07 ENCOUNTER — ANESTHESIA EVENT (OUTPATIENT)
Dept: CARDIAC CATH/INVASIVE PROCEDURES | Age: 75
DRG: 274 | End: 2020-10-07
Payer: MEDICARE

## 2020-10-07 ENCOUNTER — HOSPITAL ENCOUNTER (INPATIENT)
Dept: CARDIAC CATH/INVASIVE PROCEDURES | Age: 75
LOS: 1 days | Discharge: HOME | DRG: 274 | End: 2020-10-08
Attending: INTERNAL MEDICINE | Admitting: INTERNAL MEDICINE
Payer: MEDICARE

## 2020-10-07 VITALS
DIASTOLIC BLOOD PRESSURE: 57 MMHG | OXYGEN SATURATION: 100 % | TEMPERATURE: 96.8 F | SYSTOLIC BLOOD PRESSURE: 120 MMHG | RESPIRATION RATE: 2 BRPM

## 2020-10-07 PROBLEM — I48.0 PAROXYSMAL ATRIAL FIBRILLATION (HCC): Status: ACTIVE | Noted: 2020-10-07

## 2020-10-07 LAB
ABO/RH: NORMAL
ACTIVATED CLOTTING TIME: 217 SEC (ref 99–130)
ACTIVATED CLOTTING TIME: 244 SEC (ref 99–130)
ACTIVATED CLOTTING TIME: 331 SEC (ref 99–130)
ANTIBODY SCREEN: NORMAL

## 2020-10-07 PROCEDURE — 4A0234Z MEASUREMENT OF CARDIAC ELECTRICAL ACTIVITY, PERCUTANEOUS APPROACH: ICD-10-PCS | Performed by: INTERNAL MEDICINE

## 2020-10-07 PROCEDURE — 93320 DOPPLER ECHO COMPLETE: CPT | Performed by: FAMILY MEDICINE

## 2020-10-07 PROCEDURE — 6360000002 HC RX W HCPCS: Performed by: NURSE ANESTHETIST, CERTIFIED REGISTERED

## 2020-10-07 PROCEDURE — C1732 CATH, EP, DIAG/ABL, 3D/VECT: HCPCS

## 2020-10-07 PROCEDURE — 93622 COMP EP EVAL L VENTR PAC&REC: CPT | Performed by: INTERNAL MEDICINE

## 2020-10-07 PROCEDURE — 6370000000 HC RX 637 (ALT 250 FOR IP): Performed by: INTERNAL MEDICINE

## 2020-10-07 PROCEDURE — 7100000001 HC PACU RECOVERY - ADDTL 15 MIN

## 2020-10-07 PROCEDURE — B246ZZZ ULTRASONOGRAPHY OF RIGHT AND LEFT HEART: ICD-10-PCS | Performed by: INTERNAL MEDICINE

## 2020-10-07 PROCEDURE — 93622 COMP EP EVAL L VENTR PAC&REC: CPT | Performed by: FAMILY MEDICINE

## 2020-10-07 PROCEDURE — 93656 COMPRE EP EVAL ABLTJ ATR FIB: CPT | Performed by: FAMILY MEDICINE

## 2020-10-07 PROCEDURE — 93312 ECHO TRANSESOPHAGEAL: CPT | Performed by: FAMILY MEDICINE

## 2020-10-07 PROCEDURE — 02583ZZ DESTRUCTION OF CONDUCTION MECHANISM, PERCUTANEOUS APPROACH: ICD-10-PCS | Performed by: INTERNAL MEDICINE

## 2020-10-07 PROCEDURE — 86900 BLOOD TYPING SEROLOGIC ABO: CPT

## 2020-10-07 PROCEDURE — 94640 AIRWAY INHALATION TREATMENT: CPT

## 2020-10-07 PROCEDURE — 6360000002 HC RX W HCPCS: Performed by: INTERNAL MEDICINE

## 2020-10-07 PROCEDURE — 6360000002 HC RX W HCPCS

## 2020-10-07 PROCEDURE — 2500000003 HC RX 250 WO HCPCS

## 2020-10-07 PROCEDURE — 2060000000 HC ICU INTERMEDIATE R&B

## 2020-10-07 PROCEDURE — C1759 CATH, INTRA ECHOCARDIOGRAPHY: HCPCS

## 2020-10-07 PROCEDURE — 2580000003 HC RX 258: Performed by: INTERNAL MEDICINE

## 2020-10-07 PROCEDURE — C1730 CATH, EP, 19 OR FEW ELECT: HCPCS

## 2020-10-07 PROCEDURE — 2500000003 HC RX 250 WO HCPCS: Performed by: NURSE ANESTHETIST, CERTIFIED REGISTERED

## 2020-10-07 PROCEDURE — 94664 DEMO&/EVAL PT USE INHALER: CPT

## 2020-10-07 PROCEDURE — 2700000000 HC OXYGEN THERAPY PER DAY

## 2020-10-07 PROCEDURE — 6370000000 HC RX 637 (ALT 250 FOR IP): Performed by: ANESTHESIOLOGY

## 2020-10-07 PROCEDURE — 93662 INTRACARDIAC ECG (ICE): CPT | Performed by: INTERNAL MEDICINE

## 2020-10-07 PROCEDURE — 4A023FZ MEASUREMENT OF CARDIAC RHYTHM, PERCUTANEOUS APPROACH: ICD-10-PCS | Performed by: INTERNAL MEDICINE

## 2020-10-07 PROCEDURE — 94761 N-INVAS EAR/PLS OXIMETRY MLT: CPT

## 2020-10-07 PROCEDURE — 86850 RBC ANTIBODY SCREEN: CPT

## 2020-10-07 PROCEDURE — B245ZZ4 ULTRASONOGRAPHY OF LEFT HEART, TRANSESOPHAGEAL: ICD-10-PCS | Performed by: INTERNAL MEDICINE

## 2020-10-07 PROCEDURE — 2709999900 HC NON-CHARGEABLE SUPPLY

## 2020-10-07 PROCEDURE — 93623 PRGRMD STIMJ&PACG IV RX NFS: CPT | Performed by: FAMILY MEDICINE

## 2020-10-07 PROCEDURE — 93613 INTRACARDIAC EPHYS 3D MAPG: CPT | Performed by: INTERNAL MEDICINE

## 2020-10-07 PROCEDURE — 93613 INTRACARDIAC EPHYS 3D MAPG: CPT | Performed by: FAMILY MEDICINE

## 2020-10-07 PROCEDURE — 6360000002 HC RX W HCPCS: Performed by: ANESTHESIOLOGY

## 2020-10-07 PROCEDURE — 7100000000 HC PACU RECOVERY - FIRST 15 MIN

## 2020-10-07 PROCEDURE — 85347 COAGULATION TIME ACTIVATED: CPT

## 2020-10-07 PROCEDURE — 93656 COMPRE EP EVAL ABLTJ ATR FIB: CPT | Performed by: INTERNAL MEDICINE

## 2020-10-07 PROCEDURE — 86901 BLOOD TYPING SEROLOGIC RH(D): CPT

## 2020-10-07 PROCEDURE — 93312 ECHO TRANSESOPHAGEAL: CPT | Performed by: INTERNAL MEDICINE

## 2020-10-07 PROCEDURE — 3E083GC INTRODUCTION OF OTHER THERAPEUTIC SUBSTANCE INTO HEART, PERCUTANEOUS APPROACH: ICD-10-PCS | Performed by: INTERNAL MEDICINE

## 2020-10-07 PROCEDURE — 2580000003 HC RX 258: Performed by: NURSE ANESTHETIST, CERTIFIED REGISTERED

## 2020-10-07 PROCEDURE — 93657 TX L/R ATRIAL FIB ADDL: CPT | Performed by: FAMILY MEDICINE

## 2020-10-07 PROCEDURE — 93005 ELECTROCARDIOGRAM TRACING: CPT | Performed by: INTERNAL MEDICINE

## 2020-10-07 PROCEDURE — 93623 PRGRMD STIMJ&PACG IV RX NFS: CPT | Performed by: INTERNAL MEDICINE

## 2020-10-07 PROCEDURE — 93655 ICAR CATH ABLTJ DSCRT ARRHYT: CPT | Performed by: FAMILY MEDICINE

## 2020-10-07 PROCEDURE — 93655 ICAR CATH ABLTJ DSCRT ARRHYT: CPT | Performed by: INTERNAL MEDICINE

## 2020-10-07 PROCEDURE — 93325 DOPPLER ECHO COLOR FLOW MAPG: CPT | Performed by: FAMILY MEDICINE

## 2020-10-07 PROCEDURE — 93662 INTRACARDIAC ECG (ICE): CPT | Performed by: FAMILY MEDICINE

## 2020-10-07 PROCEDURE — 3700000000 HC ANESTHESIA ATTENDED CARE

## 2020-10-07 PROCEDURE — 3700000001 HC ADD 15 MINUTES (ANESTHESIA)

## 2020-10-07 PROCEDURE — C1894 INTRO/SHEATH, NON-LASER: HCPCS

## 2020-10-07 PROCEDURE — 2580000003 HC RX 258

## 2020-10-07 PROCEDURE — 02K83ZZ MAP CONDUCTION MECHANISM, PERCUTANEOUS APPROACH: ICD-10-PCS | Performed by: INTERNAL MEDICINE

## 2020-10-07 RX ORDER — PROTAMINE SULFATE 10 MG/ML
INJECTION, SOLUTION INTRAVENOUS PRN
Status: DISCONTINUED | OUTPATIENT
Start: 2020-10-07 | End: 2020-10-07 | Stop reason: SDUPTHER

## 2020-10-07 RX ORDER — HEPARIN SODIUM 1000 [USP'U]/ML
INJECTION, SOLUTION INTRAVENOUS; SUBCUTANEOUS PRN
Status: DISCONTINUED | OUTPATIENT
Start: 2020-10-07 | End: 2020-10-07 | Stop reason: SDUPTHER

## 2020-10-07 RX ORDER — ISOSORBIDE MONONITRATE 30 MG/1
30 TABLET, EXTENDED RELEASE ORAL DAILY
Status: DISCONTINUED | OUTPATIENT
Start: 2020-10-08 | End: 2020-10-08 | Stop reason: HOSPADM

## 2020-10-07 RX ORDER — BUDESONIDE 0.5 MG/2ML
500 INHALANT ORAL 2 TIMES DAILY
Status: DISCONTINUED | OUTPATIENT
Start: 2020-10-07 | End: 2020-10-08 | Stop reason: HOSPADM

## 2020-10-07 RX ORDER — EPHEDRINE SULFATE/0.9% NACL/PF 50 MG/5 ML
SYRINGE (ML) INTRAVENOUS PRN
Status: DISCONTINUED | OUTPATIENT
Start: 2020-10-07 | End: 2020-10-07 | Stop reason: SDUPTHER

## 2020-10-07 RX ORDER — IPRATROPIUM BROMIDE AND ALBUTEROL SULFATE 2.5; .5 MG/3ML; MG/3ML
1 SOLUTION RESPIRATORY (INHALATION)
Status: DISCONTINUED | OUTPATIENT
Start: 2020-10-07 | End: 2020-10-08 | Stop reason: HOSPADM

## 2020-10-07 RX ORDER — OXYCODONE HYDROCHLORIDE 5 MG/1
5 TABLET ORAL PRN
Status: ACTIVE | OUTPATIENT
Start: 2020-10-07 | End: 2020-10-07

## 2020-10-07 RX ORDER — SODIUM CHLORIDE 9 MG/ML
INJECTION, SOLUTION INTRAVENOUS CONTINUOUS PRN
Status: DISCONTINUED | OUTPATIENT
Start: 2020-10-07 | End: 2020-10-07 | Stop reason: SDUPTHER

## 2020-10-07 RX ORDER — ALBUTEROL SULFATE 2.5 MG/3ML
2.5 SOLUTION RESPIRATORY (INHALATION) EVERY 4 HOURS PRN
Status: DISCONTINUED | OUTPATIENT
Start: 2020-10-07 | End: 2020-10-08 | Stop reason: HOSPADM

## 2020-10-07 RX ORDER — DOBUTAMINE HYDROCHLORIDE 200 MG/100ML
INJECTION INTRAVENOUS CONTINUOUS PRN
Status: DISCONTINUED | OUTPATIENT
Start: 2020-10-07 | End: 2020-10-07 | Stop reason: SDUPTHER

## 2020-10-07 RX ORDER — ONDANSETRON 2 MG/ML
INJECTION INTRAMUSCULAR; INTRAVENOUS PRN
Status: DISCONTINUED | OUTPATIENT
Start: 2020-10-07 | End: 2020-10-07 | Stop reason: SDUPTHER

## 2020-10-07 RX ORDER — MIDAZOLAM HYDROCHLORIDE 1 MG/ML
INJECTION INTRAMUSCULAR; INTRAVENOUS PRN
Status: DISCONTINUED | OUTPATIENT
Start: 2020-10-07 | End: 2020-10-07 | Stop reason: SDUPTHER

## 2020-10-07 RX ORDER — ARFORMOTEROL TARTRATE 15 UG/2ML
15 SOLUTION RESPIRATORY (INHALATION) 2 TIMES DAILY
Status: DISCONTINUED | OUTPATIENT
Start: 2020-10-07 | End: 2020-10-08 | Stop reason: HOSPADM

## 2020-10-07 RX ORDER — PHENYLEPHRINE HCL IN 0.9% NACL 1 MG/10 ML
SYRINGE (ML) INTRAVENOUS PRN
Status: DISCONTINUED | OUTPATIENT
Start: 2020-10-07 | End: 2020-10-07 | Stop reason: SDUPTHER

## 2020-10-07 RX ORDER — HYDRALAZINE HYDROCHLORIDE 50 MG/1
50 TABLET, FILM COATED ORAL EVERY 8 HOURS SCHEDULED
Status: DISCONTINUED | OUTPATIENT
Start: 2020-10-07 | End: 2020-10-08 | Stop reason: HOSPADM

## 2020-10-07 RX ORDER — ROSUVASTATIN CALCIUM 20 MG/1
20 TABLET, COATED ORAL DAILY
Status: DISCONTINUED | OUTPATIENT
Start: 2020-10-08 | End: 2020-10-08 | Stop reason: HOSPADM

## 2020-10-07 RX ORDER — 0.9 % SODIUM CHLORIDE 0.9 %
1000 INTRAVENOUS SOLUTION INTRAVENOUS
Status: ACTIVE | OUTPATIENT
Start: 2020-10-07 | End: 2020-10-07

## 2020-10-07 RX ORDER — LIDOCAINE HYDROCHLORIDE AND EPINEPHRINE BITARTRATE 20; .01 MG/ML; MG/ML
15 INJECTION, SOLUTION SUBCUTANEOUS SEE ADMIN INSTRUCTIONS
Status: DISCONTINUED | OUTPATIENT
Start: 2020-10-07 | End: 2020-10-08 | Stop reason: HOSPADM

## 2020-10-07 RX ORDER — NIFEDIPINE 60 MG/1
60 TABLET, EXTENDED RELEASE ORAL DAILY
Status: DISCONTINUED | OUTPATIENT
Start: 2020-10-08 | End: 2020-10-08 | Stop reason: HOSPADM

## 2020-10-07 RX ORDER — PROTAMINE SULFATE 10 MG/ML
30 INJECTION, SOLUTION INTRAVENOUS
Status: ACTIVE | OUTPATIENT
Start: 2020-10-07 | End: 2020-10-07

## 2020-10-07 RX ORDER — PROPOFOL 10 MG/ML
INJECTION, EMULSION INTRAVENOUS PRN
Status: DISCONTINUED | OUTPATIENT
Start: 2020-10-07 | End: 2020-10-07 | Stop reason: SDUPTHER

## 2020-10-07 RX ORDER — LIDOCAINE HYDROCHLORIDE 10 MG/ML
INJECTION, SOLUTION EPIDURAL; INFILTRATION; INTRACAUDAL; PERINEURAL PRN
Status: DISCONTINUED | OUTPATIENT
Start: 2020-10-07 | End: 2020-10-07 | Stop reason: SDUPTHER

## 2020-10-07 RX ORDER — MEPERIDINE HYDROCHLORIDE 25 MG/ML
12.5 INJECTION INTRAMUSCULAR; INTRAVENOUS; SUBCUTANEOUS EVERY 5 MIN PRN
Status: DISCONTINUED | OUTPATIENT
Start: 2020-10-07 | End: 2020-10-08 | Stop reason: HOSPADM

## 2020-10-07 RX ORDER — ONDANSETRON 2 MG/ML
4 INJECTION INTRAMUSCULAR; INTRAVENOUS
Status: COMPLETED | OUTPATIENT
Start: 2020-10-07 | End: 2020-10-07

## 2020-10-07 RX ORDER — BUSPIRONE HYDROCHLORIDE 5 MG/1
5 TABLET ORAL 2 TIMES DAILY
Status: DISCONTINUED | OUTPATIENT
Start: 2020-10-07 | End: 2020-10-08 | Stop reason: HOSPADM

## 2020-10-07 RX ORDER — VECURONIUM BROMIDE 1 MG/ML
INJECTION, POWDER, LYOPHILIZED, FOR SOLUTION INTRAVENOUS PRN
Status: DISCONTINUED | OUTPATIENT
Start: 2020-10-07 | End: 2020-10-07 | Stop reason: SDUPTHER

## 2020-10-07 RX ORDER — FENTANYL CITRATE 50 UG/ML
INJECTION, SOLUTION INTRAMUSCULAR; INTRAVENOUS PRN
Status: DISCONTINUED | OUTPATIENT
Start: 2020-10-07 | End: 2020-10-07 | Stop reason: SDUPTHER

## 2020-10-07 RX ORDER — METOPROLOL SUCCINATE 50 MG/1
50 TABLET, EXTENDED RELEASE ORAL DAILY
Status: DISCONTINUED | OUTPATIENT
Start: 2020-10-08 | End: 2020-10-08 | Stop reason: HOSPADM

## 2020-10-07 RX ORDER — ACETAMINOPHEN 325 MG/1
650 TABLET ORAL EVERY 4 HOURS PRN
Status: DISCONTINUED | OUTPATIENT
Start: 2020-10-07 | End: 2020-10-08 | Stop reason: HOSPADM

## 2020-10-07 RX ORDER — OXYCODONE HYDROCHLORIDE 10 MG/1
10 TABLET ORAL PRN
Status: ACTIVE | OUTPATIENT
Start: 2020-10-07 | End: 2020-10-07

## 2020-10-07 RX ORDER — SUCCINYLCHOLINE/SOD CL,ISO/PF 200MG/10ML
SYRINGE (ML) INTRAVENOUS PRN
Status: DISCONTINUED | OUTPATIENT
Start: 2020-10-07 | End: 2020-10-07 | Stop reason: SDUPTHER

## 2020-10-07 RX ORDER — HEPARIN SODIUM 200 [USP'U]/100ML
INJECTION, SOLUTION INTRAVENOUS CONTINUOUS PRN
Status: DISCONTINUED | OUTPATIENT
Start: 2020-10-07 | End: 2020-10-07 | Stop reason: SDUPTHER

## 2020-10-07 RX ORDER — ALBUTEROL SULFATE 90 UG/1
2 AEROSOL, METERED RESPIRATORY (INHALATION) EVERY 4 HOURS PRN
Status: DISCONTINUED | OUTPATIENT
Start: 2020-10-07 | End: 2020-10-08 | Stop reason: HOSPADM

## 2020-10-07 RX ORDER — FLECAINIDE ACETATE 100 MG/1
50 TABLET ORAL EVERY 12 HOURS SCHEDULED
Status: DISCONTINUED | OUTPATIENT
Start: 2020-10-07 | End: 2020-10-08 | Stop reason: HOSPADM

## 2020-10-07 RX ORDER — SODIUM CHLORIDE 0.9 % (FLUSH) 0.9 %
10 SYRINGE (ML) INJECTION EVERY 12 HOURS SCHEDULED
Status: DISCONTINUED | OUTPATIENT
Start: 2020-10-07 | End: 2020-10-07 | Stop reason: SDUPTHER

## 2020-10-07 RX ORDER — SODIUM CHLORIDE 0.9 % (FLUSH) 0.9 %
10 SYRINGE (ML) INJECTION PRN
Status: DISCONTINUED | OUTPATIENT
Start: 2020-10-07 | End: 2020-10-08 | Stop reason: HOSPADM

## 2020-10-07 RX ORDER — FENTANYL CITRATE 50 UG/ML
25 INJECTION, SOLUTION INTRAMUSCULAR; INTRAVENOUS EVERY 5 MIN PRN
Status: DISCONTINUED | OUTPATIENT
Start: 2020-10-07 | End: 2020-10-08 | Stop reason: HOSPADM

## 2020-10-07 RX ORDER — HYDROXYZINE PAMOATE 25 MG/1
25 CAPSULE ORAL 2 TIMES DAILY PRN
Status: DISCONTINUED | OUTPATIENT
Start: 2020-10-07 | End: 2020-10-08 | Stop reason: HOSPADM

## 2020-10-07 RX ORDER — FENTANYL CITRATE 50 UG/ML
50 INJECTION, SOLUTION INTRAMUSCULAR; INTRAVENOUS EVERY 5 MIN PRN
Status: DISCONTINUED | OUTPATIENT
Start: 2020-10-07 | End: 2020-10-08 | Stop reason: HOSPADM

## 2020-10-07 RX ORDER — SODIUM CHLORIDE 0.9 % (FLUSH) 0.9 %
10 SYRINGE (ML) INJECTION EVERY 12 HOURS SCHEDULED
Status: DISCONTINUED | OUTPATIENT
Start: 2020-10-07 | End: 2020-10-08 | Stop reason: HOSPADM

## 2020-10-07 RX ORDER — SODIUM CHLORIDE 0.9 % (FLUSH) 0.9 %
10 SYRINGE (ML) INJECTION PRN
Status: DISCONTINUED | OUTPATIENT
Start: 2020-10-07 | End: 2020-10-07 | Stop reason: SDUPTHER

## 2020-10-07 RX ORDER — TORSEMIDE 20 MG/1
20 TABLET ORAL DAILY
Status: DISCONTINUED | OUTPATIENT
Start: 2020-10-08 | End: 2020-10-08 | Stop reason: HOSPADM

## 2020-10-07 RX ORDER — SODIUM CHLORIDE 9 MG/ML
INJECTION, SOLUTION INTRAVENOUS CONTINUOUS
Status: DISCONTINUED | OUTPATIENT
Start: 2020-10-07 | End: 2020-10-07

## 2020-10-07 RX ORDER — FUROSEMIDE 10 MG/ML
60 INJECTION INTRAMUSCULAR; INTRAVENOUS ONCE
Status: COMPLETED | OUTPATIENT
Start: 2020-10-07 | End: 2020-10-07

## 2020-10-07 RX ADMIN — Medication 10 ML: at 21:56

## 2020-10-07 RX ADMIN — ARFORMOTEROL TARTRATE 15 MCG: 15 SOLUTION RESPIRATORY (INHALATION) at 20:46

## 2020-10-07 RX ADMIN — BUSPIRONE HYDROCHLORIDE 5 MG: 5 TABLET ORAL at 21:55

## 2020-10-07 RX ADMIN — Medication 200 MCG: at 13:15

## 2020-10-07 RX ADMIN — ONDANSETRON 4 MG: 2 INJECTION INTRAMUSCULAR; INTRAVENOUS at 13:18

## 2020-10-07 RX ADMIN — SUGAMMADEX 200 MG: 100 INJECTION, SOLUTION INTRAVENOUS at 15:05

## 2020-10-07 RX ADMIN — PROPOFOL 150 MG: 10 INJECTION, EMULSION INTRAVENOUS at 13:04

## 2020-10-07 RX ADMIN — MIDAZOLAM 2 MG: 1 INJECTION INTRAMUSCULAR; INTRAVENOUS at 12:53

## 2020-10-07 RX ADMIN — Medication 100 MCG: at 13:40

## 2020-10-07 RX ADMIN — FUROSEMIDE 60 MG: 10 INJECTION, SOLUTION INTRAMUSCULAR; INTRAVENOUS at 18:30

## 2020-10-07 RX ADMIN — HEPARIN SODIUM 100 ML/HR: 200 INJECTION, SOLUTION INTRAVENOUS at 14:04

## 2020-10-07 RX ADMIN — RIVAROXABAN 20 MG: 20 TABLET, FILM COATED ORAL at 18:30

## 2020-10-07 RX ADMIN — DOBUTAMINE HYDROCHLORIDE 10 MCG/KG/MIN: 200 INJECTION INTRAVENOUS at 14:55

## 2020-10-07 RX ADMIN — HEPARIN SODIUM 2000 UNITS: 1000 INJECTION INTRAVENOUS; SUBCUTANEOUS at 14:23

## 2020-10-07 RX ADMIN — VECURONIUM BROMIDE 2 MG: 1 INJECTION, POWDER, LYOPHILIZED, FOR SOLUTION INTRAVENOUS at 14:25

## 2020-10-07 RX ADMIN — HEPARIN SODIUM 5000 UNITS: 1000 INJECTION INTRAVENOUS; SUBCUTANEOUS at 13:44

## 2020-10-07 RX ADMIN — IPRATROPIUM BROMIDE AND ALBUTEROL SULFATE 1 AMPULE: .5; 3 SOLUTION RESPIRATORY (INHALATION) at 20:46

## 2020-10-07 RX ADMIN — FLECAINIDE ACETATE 50 MG: 100 TABLET ORAL at 21:55

## 2020-10-07 RX ADMIN — Medication 120 MG: at 13:04

## 2020-10-07 RX ADMIN — ONDANSETRON 4 MG: 2 INJECTION INTRAMUSCULAR; INTRAVENOUS at 13:30

## 2020-10-07 RX ADMIN — PROTAMINE SULFATE 50 MG: 10 INJECTION, SOLUTION INTRAVENOUS at 15:06

## 2020-10-07 RX ADMIN — FENTANYL CITRATE 50 MCG: 50 INJECTION INTRAMUSCULAR; INTRAVENOUS at 12:55

## 2020-10-07 RX ADMIN — FENTANYL CITRATE 25 MCG: 50 INJECTION INTRAMUSCULAR; INTRAVENOUS at 14:12

## 2020-10-07 RX ADMIN — HYDRALAZINE HYDROCHLORIDE 50 MG: 50 TABLET, FILM COATED ORAL at 21:55

## 2020-10-07 RX ADMIN — SODIUM CHLORIDE: 9 INJECTION, SOLUTION INTRAVENOUS at 13:38

## 2020-10-07 RX ADMIN — Medication 200 MCG: at 13:09

## 2020-10-07 RX ADMIN — HEPARIN SODIUM 5000 UNITS: 1000 INJECTION INTRAVENOUS; SUBCUTANEOUS at 13:41

## 2020-10-07 RX ADMIN — Medication 10 MG: at 13:12

## 2020-10-07 RX ADMIN — FENTANYL CITRATE 25 MCG: 50 INJECTION INTRAMUSCULAR; INTRAVENOUS at 14:24

## 2020-10-07 RX ADMIN — IPRATROPIUM BROMIDE AND ALBUTEROL SULFATE 1 AMPULE: .5; 3 SOLUTION RESPIRATORY (INHALATION) at 15:59

## 2020-10-07 RX ADMIN — OLODATEROL RESPIMAT INHALATION SPRAY 2 PUFF: 2.5 SPRAY, METERED RESPIRATORY (INHALATION) at 20:47

## 2020-10-07 RX ADMIN — VECURONIUM BROMIDE 4 MG: 1 INJECTION, POWDER, LYOPHILIZED, FOR SOLUTION INTRAVENOUS at 13:18

## 2020-10-07 RX ADMIN — BUDESONIDE 500 MCG: 0.5 SUSPENSION RESPIRATORY (INHALATION) at 20:46

## 2020-10-07 RX ADMIN — VECURONIUM BROMIDE 6 MG: 1 INJECTION, POWDER, LYOPHILIZED, FOR SOLUTION INTRAVENOUS at 13:23

## 2020-10-07 RX ADMIN — LIDOCAINE HYDROCHLORIDE 50 MG: 10 INJECTION, SOLUTION EPIDURAL; INFILTRATION; INTRACAUDAL; PERINEURAL at 13:04

## 2020-10-07 RX ADMIN — SODIUM CHLORIDE: 9 INJECTION, SOLUTION INTRAVENOUS at 12:52

## 2020-10-07 RX ADMIN — HEPARIN SODIUM 2000 UNITS: 1000 INJECTION INTRAVENOUS; SUBCUTANEOUS at 14:03

## 2020-10-07 ASSESSMENT — PULMONARY FUNCTION TESTS
PIF_VALUE: 23
PIF_VALUE: 25
PIF_VALUE: 24
PIF_VALUE: 1
PIF_VALUE: 23
PIF_VALUE: 23
PIF_VALUE: 24
PIF_VALUE: 23
PIF_VALUE: 24
PIF_VALUE: 25
PIF_VALUE: 23
PIF_VALUE: 24
PIF_VALUE: 12
PIF_VALUE: 24
PIF_VALUE: 26
PIF_VALUE: 24
PIF_VALUE: 24
PIF_VALUE: 2
PIF_VALUE: 23
PIF_VALUE: 32
PIF_VALUE: 35
PIF_VALUE: 25
PIF_VALUE: 23
PIF_VALUE: 2
PIF_VALUE: 35
PIF_VALUE: 26
PIF_VALUE: 1
PIF_VALUE: 24
PIF_VALUE: 27
PIF_VALUE: 2
PIF_VALUE: 26
PIF_VALUE: 25
PIF_VALUE: 26
PIF_VALUE: 39
PIF_VALUE: 35
PIF_VALUE: 25
PIF_VALUE: 24
PIF_VALUE: 25
PIF_VALUE: 24
PIF_VALUE: 26
PIF_VALUE: 26
PIF_VALUE: 24
PIF_VALUE: 23
PIF_VALUE: 12
PIF_VALUE: 25
PIF_VALUE: 26
PIF_VALUE: 25
PIF_VALUE: 26
PIF_VALUE: 23
PIF_VALUE: 23
PIF_VALUE: 24
PIF_VALUE: 23
PIF_VALUE: 24
PIF_VALUE: 23
PIF_VALUE: 24
PIF_VALUE: 27
PIF_VALUE: 27
PIF_VALUE: 35
PIF_VALUE: 24
PIF_VALUE: 23
PIF_VALUE: 24
PIF_VALUE: 23
PIF_VALUE: 25
PIF_VALUE: 28
PIF_VALUE: 23
PIF_VALUE: 24
PIF_VALUE: 29
PIF_VALUE: 25
PIF_VALUE: 24
PIF_VALUE: 24
PIF_VALUE: 26
PIF_VALUE: 24
PIF_VALUE: 25
PIF_VALUE: 35
PIF_VALUE: 25
PIF_VALUE: 3
PIF_VALUE: 26
PIF_VALUE: 23
PIF_VALUE: 27
PIF_VALUE: 23
PIF_VALUE: 42
PIF_VALUE: 24
PIF_VALUE: 24
PIF_VALUE: 1
PIF_VALUE: 24
PIF_VALUE: 35
PIF_VALUE: 24
PIF_VALUE: 25
PIF_VALUE: 3
PIF_VALUE: 24
PIF_VALUE: 2
PIF_VALUE: 25
PIF_VALUE: 35
PIF_VALUE: 26
PIF_VALUE: 24
PIF_VALUE: 26
PIF_VALUE: 26
PIF_VALUE: 24
PIF_VALUE: 23
PIF_VALUE: 24
PIF_VALUE: 25
PIF_VALUE: 24
PIF_VALUE: 25
PIF_VALUE: 35
PIF_VALUE: 23
PIF_VALUE: 24
PIF_VALUE: 25
PIF_VALUE: 23
PIF_VALUE: 25
PIF_VALUE: 24
PIF_VALUE: 5
PIF_VALUE: 24
PIF_VALUE: 23

## 2020-10-07 ASSESSMENT — PAIN SCALES - GENERAL
PAINLEVEL_OUTOF10: 0

## 2020-10-07 ASSESSMENT — LIFESTYLE VARIABLES: SMOKING_STATUS: 0

## 2020-10-07 ASSESSMENT — ENCOUNTER SYMPTOMS: SHORTNESS OF BREATH: 1

## 2020-10-07 ASSESSMENT — COPD QUESTIONNAIRES: CAT_SEVERITY: SEVERE

## 2020-10-07 NOTE — PROGRESS NOTES
Pt to PACU from OR. SREEDHAR. Alysia Awan. Dressing to right groin clean dry and intact. Figure of 8 suture intact. Pt states no pain. Cap refill less than 3, pedal pulses palpable. Continue to monitor.

## 2020-10-07 NOTE — ANESTHESIA PRE PROCEDURE
Department of Anesthesiology  Preprocedure Note       Name:  Don Lane   Age:  77 y.o.  :  1945                                          MRN:  [id number]         Date:  10/7/2020      Surgeon: * Surgery not found *    Procedure:     Medications prior to admission:   Prior to Admission medications    Medication Sig Start Date End Date Taking?  Authorizing Provider   NIFEdipine (ADALAT CC) 60 MG extended release tablet Take 1 tablet by mouth daily 20   Praveen Wilkinson MD   hydrOXYzine (VISTARIL) 25 MG capsule Take 1 capsule by mouth 2 times daily as needed for Anxiety 20   Rhiannon Quigley MD   metoprolol succinate (TOPROL XL) 50 MG extended release tablet Take 1 tablet by mouth daily 20   Iris Syed MD   rosuvastatin (CRESTOR) 20 MG tablet Take 1 tablet by mouth daily 20   Columba Wilson MD   isosorbide mononitrate (IMDUR) 30 MG extended release tablet TAKE ONE TABLET BY MOUTH DAILY 20   Dejuan De Leon MD   rivaroxaban (XARELTO) 20 MG TABS tablet TAKE ONE TABLET BY MOUTH DAILY WITH BREAKFAST 20   Carri Addison DO   albuterol sulfate HFA (PROAIR HFA) 108 (90 Base) MCG/ACT inhaler Inhale 2 puffs into the lungs every 4 hours as needed for Wheezing or Shortness of Breath 20   Carri Addison DO   busPIRone (BUSPAR) 5 MG tablet Take 1 tablet by mouth 2 times daily 3/13/20   Rhiannon Quigley MD   Arformoterol Tartrate [facility]) 15 MCG/2ML NEBU Take 2 mLs by nebulization 2 times daily 20   Helen Keys MD   hydrALAZINE (APRESOLINE) 50 MG tablet Take 1 tablet by mouth every 8 hours 20   Helen Keys MD   torsemide (DEMADEX) 20 MG tablet Take 1 tablet by mouth daily 3/1/20   Helen Keys MD   albuterol (PROVENTIL) (2.5 MG/3ML) 0.083% nebulizer solution Take 3 mLs by nebulization every 4 hours as needed for Wheezing 20   Carri Addison DO   budesonide (PULMICORT) 0.5 MG/2ML nebulizer suspension Take 2 mLs by nebulization 2 times daily 7/24/19   Benson Normanoka, DO   formoterol (PERFOROMIST) 20 MCG/2ML nebulizer solution Take 2 mLs by nebulization 2 times daily 7/24/19   Benson Normanoka, DO   nitroGLYCERIN (NITROSTAT) 0.4 MG SL tablet Place 1 tablet under the tongue every 5 minutes as needed for Chest pain 3/5/19   Surinder Morales MD   Multiple Vitamins-Minerals (MULTI FOR HER 50+ PO) Take 1 tablet by mouth daily    Historical Provider, MD       Current medications:    Current Outpatient Medications   Medication Sig Dispense Refill    NIFEdipine (ADALAT CC) 60 MG extended release tablet Take 1 tablet by mouth daily 30 tablet 1    hydrOXYzine (VISTARIL) 25 MG capsule Take 1 capsule by mouth 2 times daily as needed for Anxiety 30 capsule 0    metoprolol succinate (TOPROL XL) 50 MG extended release tablet Take 1 tablet by mouth daily 90 tablet 3    rosuvastatin (CRESTOR) 20 MG tablet Take 1 tablet by mouth daily 30 tablet 11    isosorbide mononitrate (IMDUR) 30 MG extended release tablet TAKE ONE TABLET BY MOUTH DAILY 90 tablet 5    rivaroxaban (XARELTO) 20 MG TABS tablet TAKE ONE TABLET BY MOUTH DAILY WITH BREAKFAST 30 tablet 5    albuterol sulfate HFA (PROAIR HFA) 108 (90 Base) MCG/ACT inhaler Inhale 2 puffs into the lungs every 4 hours as needed for Wheezing or Shortness of Breath 1 Inhaler 11    busPIRone (BUSPAR) 5 MG tablet Take 1 tablet by mouth 2 times daily 60 tablet 1    Arformoterol Tartrate (BROVANA) 15 MCG/2ML NEBU Take 2 mLs by nebulization 2 times daily 120 mL 3    hydrALAZINE (APRESOLINE) 50 MG tablet Take 1 tablet by mouth every 8 hours 90 tablet 3    torsemide (DEMADEX) 20 MG tablet Take 1 tablet by mouth daily 30 tablet 3    albuterol (PROVENTIL) (2.5 MG/3ML) 0.083% nebulizer solution Take 3 mLs by nebulization every 4 hours as needed for Wheezing 120 mL 5    budesonide (PULMICORT) 0.5 MG/2ML nebulizer suspension Take 2 mLs by nebulization 2 times daily 360 mL 3    formoterol (PERFOROMIST) 20 MCG/2ML nebulizer solution Take 2 mLs by nebulization 2 times daily 120 mL 3    nitroGLYCERIN (NITROSTAT) 0.4 MG SL tablet Place 1 tablet under the tongue every 5 minutes as needed for Chest pain 25 tablet 1    Multiple Vitamins-Minerals (MULTI FOR HER 50+ PO) Take 1 tablet by mouth daily       Current Facility-Administered Medications   Medication Dose Route Frequency Provider Last Rate Last Dose    0.9 % sodium chloride infusion   Intravenous Continuous Gris Wall MD        sodium chloride flush 0.9 % injection 10 mL  10 mL Intravenous 2 times per day Gris Wall MD        sodium chloride flush 0.9 % injection 10 mL  10 mL Intravenous PRN Gris Wall MD           Allergies:     Allergies   Allergen Reactions    Morphine Rash     rash    Other Rash       Problem List:    Patient Active Problem List   Diagnosis Code    Essential hypertension I10    Hyperlipidemia E78.5    Coronary artery disease I25.10    Sleep apnea G47.30    History of DVT (deep vein thrombosis) Z86.718    Family history of DVT Z82.49    AAA (abdominal aortic aneurysm) without rupture ([facility]) I71.4    Bilateral pulmonary embolism ([facility]) I26.99    DVT, bilateral lower limbs ([facility]) I82.403    Pleural effusion, left J90    Dyspnea and respiratory abnormality R06.00, R06.89    Pleural effusion J90    PE (pulmonary thromboembolism) ([facility]) I26.99    COPD exacerbation ([facility]) J44.1    Pelvic pain in female R10.2    Vitamin D deficiency E55.9    Other emphysema ([address] J43.8    Acute bronchitis J20.9    Acute respiratory failure with hypoxia ([facility] J96.01    Abnormal chest x-ray R93.89    Atypical pneumonia J18.9    Atrial fibrillation with RVR ([facility]) I48.91    Acute on chronic diastolic heart failure ([facility]) I50.33    PVD (peripheral vascular disease) ([facility]) I73.9    Abnormal nuclear stress test R94.39    AAA (abdominal aortic aneurysm) ([facility]) I71.4    PAF (paroxysmal atrial fibrillation) ([facility]) I48.0    [place] post (EVAR) endovascular aneurysm repair, 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    Carotid artery stenosis without cerebral infarction, bilateral I65.23    History of repair of aneurysm of abdominal aorta using endovascular stent graft Z95.828    Atherosclerotic heart disease of native coronary artery with other forms of angina pectoris ([facility] [address] I25.118    Morbidly obese (HCC) E66.01    COPD, severe (HCC) J44.9    Hypertensive crisis I16.9    CHF (congestive heart failure) (HCC) I50.9    Respiratory failure (HCC) J96.90    UTI (urinary tract infection) N39.0    Paroxysmal atrial fibrillation (HCC) I48.0       Past Medical History:        Diagnosis Date    AAA (abdominal aortic aneurysm) ([facility]     pt states it is 4cm    AAA (abdominal aortic aneurysm) without rupture ([facility] [address] 2/10/2015    Atrial fibrillation (HCC)     CAD (coronary artery disease)     CHF (congestive heart failure) (HCC)     COPD (chronic obstructive pulmonary disease) ([facility]     History of blood clots     Hyperlipidemia     Hypertension        Past Surgical History:        Procedure Laterality Date    ABDOMINAL AORTIC ANEURYSM REPAIR      Endovascular abdominal AA    APPENDECTOMY      incidental    BLADDER SUSPENSION      CATARACT REMOVAL      CHOLECYSTECTOMY  10/15/13    COLONOSCOPY  07    dr Chung Wagner and check in 5 years.  COLONOSCOPY  2017    ok dr arndt, repeat 5 years   [address]    for benign tumor.  just the uterus    JOINT REPLACEMENT  2013    right knee replacement    TONSILLECTOMY  as a child    TUMOR EXCISION      benign behind right ear about        Social History:    Social History     Tobacco Use    Smoking status: Former Smoker     Packs/day: 1.00     Years: 37.00     Pack years: 37.00     Types: Cigarettes     Start date: 1976     Last attempt to quit: 2013     Years since quittin.0    Smokeless tobacco: Never Used    Tobacco comment: E cigarette now and then   Substance Use Topics    Alcohol use: No                                Counseling given: Not Answered  Comment: E cigarette now and then      Vital Signs (Current):   Vitals:    10/07/20 1148   BP: (!) 143/78   Pulse: 76   Resp: 16   Temp: 98.1 °F (36.7 °C)   TempSrc: Oral   SpO2: 96%   Weight: 214 lb (97.1 kg)   Height: 5' 4\" (1.626 m)                                              BP Readings from Last 3 Encounters:   10/07/20 (!) 143/78   09/30/20 130/78   09/25/20 (!) 125/49       NPO Status:                                                                                 BMI:   Wt Readings from Last 3 Encounters:   10/07/20 214 lb (97.1 kg)   09/30/20 214 lb (97.1 kg)   09/25/20 217 lb 2.5 oz (98.5 kg)     Body mass index is 36.73 kg/m². CBC:   Lab Results   Component Value Date    WBC 8.0 09/25/2020    RBC 4.84 09/25/2020    RBC 4.19 04/13/2017    HGB 13.8 09/25/2020    HCT 42.7 09/25/2020    MCV 88.1 09/25/2020    RDW 15.1 09/25/2020     09/25/2020       CMP:   Lab Results   Component Value Date     09/25/2020    K 4.4 09/25/2020    K 4.9 02/25/2020    CL 98 09/25/2020    CO2 25 09/25/2020    BUN 27 09/25/2020    CREATININE 1.2 09/25/2020    GFRAA 53 09/25/2020    GFRAA >60 06/25/2012    AGRATIO 1.7 09/24/2020    LABGLOM 44 09/25/2020    GLUCOSE 131 09/25/2020    GLUCOSE 102 07/14/2016    PROT 6.7 09/24/2020    PROT 7.9 07/14/2016    CALCIUM 9.6 09/25/2020    BILITOT 0.4 09/24/2020    ALKPHOS 82 09/24/2020    AST 24 09/24/2020    ALT 29 09/24/2020       POC Tests: No results for input(s): POCGLU, POCNA, POCK, POCCL, POCBUN, POCHEMO, POCHCT in the last 72 hours.     Coags:   Lab Results   Component Value Date    PROTIME 26.0 02/15/2020    INR 2.22 02/15/2020    APTT 37.2 02/15/2020       HCG (If Applicable): No results found for: PREGTESTUR, PREGSERUM, HCG, HCGQUANT     ABGs:   Lab Results   Component Value Date    PHART 7.399 06/04/2015    PO2ART 78.5 06/04/2015    XSY6MLQ 42.9 06/04/2015    YAE9XPA 26.0 06/04/2015 BEART 1.5 06/04/2015    E7JXNMFQ 96.0 06/04/2015        Type & Screen (If Applicable):  No results found for: LABABO, LABRH    Drug/Infectious Status (If Applicable):  No results found for: HIV, HEPCAB    COVID-19 Screening (If Applicable):   Lab Results   Component Value Date    COVID19 NOT DETECTED 10/05/2020         Anesthesia Evaluation  Patient summary reviewed and Nursing notes reviewed no history of anesthetic complications:   Airway: Mallampati: II  TM distance: >3 FB   Neck ROM: full  Mouth opening: > = 3 FB Dental:    (+) upper dentures, lower dentures and edentulous      Pulmonary: breath sounds clear to auscultation  (+) COPD (severe): severe,  shortness of breath: chronic,  sleep apnea:      (-) pneumonia, recent URI and not a current smoker                           Cardiovascular:  Exercise tolerance: poor (<4 METS),   (+) hypertension:, angina:, CAD:, dysrhythmias: atrial fibrillation, CHF:, LI:, hyperlipidemia    (-) pacemaker, valvular problems/murmurs, past MI, orthopnea and PND      Rhythm: irregular                      Neuro/Psych:      (-) seizures, neuromuscular disease, TIA, CVA, headaches, psychiatric history and depression/anxiety            GI/Hepatic/Renal:        (-) hiatal hernia, GERD, PUD, hepatitis, liver disease, no renal disease, bowel prep and no morbid obesity      ROS comment: obesity. Endo/Other:    (+) blood dyscrasia (xarelto): anticoagulation therapy, arthritis:., .    (-) diabetes mellitus, hypothyroidism, hyperthyroidism               Abdominal:           Vascular:   + PVD, aortic or cerebral (AAA s/p repair, then with endoleak), DVT, PE. Anesthesia Plan      general     ASA 3       Induction: intravenous. MIPS: Prophylactic antiemetics administered. Anesthetic plan and risks discussed with patient. Plan discussed with CRNA.                   Sammie Negron MD   10/7/2020      This pre-anesthesia assessment may be used as a history and physical.    DOS STAFF ADDENDUM:    Pt seen and examined, chart reviewed (including anesthesia, drug and allergy history). No interval changes to history and physical examination. Anesthetic plan, risks, benefits, alternatives, and personnel involved discussed with patient. Patient verbalized an understanding and agrees to proceed.       Daisy Gordon MD  October 7, 2020  12:08 PM

## 2020-10-07 NOTE — H&P
Cardiac Electrophysiology Consultation   Date: 10/7/2020  Reason for Consultation: Atrial fibrillation  Consult Requesting Physician:  Christine Lion MD  Primary Care Physician: Blaine Suresh MD     Chief Complaint:        Chief Complaint   Patient presents with    New Patient       afib - poss ablation    Shortness of Breath         HPI: Tyrell Rosa is a 77 y.o. patient with a history of CAD, atrial fibrillation, hypertension, aortic aneurysm, COPD, PE , DVT,chronic diastolic heart failure. She presents to office today accompanied by her  as a referral from her primary cardiologist Dr. Hazel Lagos to discuss treatment options for her atrial fibrillation. She reports that she is symptomatic with shortness of breath and palpitations. She is compliant with her medications and tolerating them well. She denies chest pain/pressure, tightness, edema, heart racing, palpitations, lightheadedness, dizziness, syncope, presyncope,  PND or orthopnea.         Past Medical History        Past Medical History:   Diagnosis Date    AAA (abdominal aortic aneurysm) ([facility] [address]       pt states it is 4cm    AAA (abdominal aortic aneurysm) without rupture ([address] 2/10/2015    Atrial fibrillation (HCC)      CAD (coronary artery disease)      CHF (congestive heart failure) (HCC)      COPD (chronic obstructive pulmonary disease) (HCC)      History of blood clots      Hyperlipidemia      Hypertension              Past Surgical History         Past Surgical History:   Procedure Laterality Date    ABDOMINAL AORTIC ANEURYSM REPAIR         Endovascular abdominal AA    APPENDECTOMY   1990     incidental    BLADDER SUSPENSION        CATARACT REMOVAL        CHOLECYSTECTOMY   10/15/13    COLONOSCOPY   9/2/07     dr Lor Elkins and check in 5 years.  COLONOSCOPY   06/16/2017     ok dr arndt, repeat 5 years   [address]     for benign tumor.  just the uterus    JOINT REPLACEMENT   12/2013     right knee replacement    TONSILLECTOMY   as a child    TUMOR EXCISION         benign behind right ear about 2008           Allergies: Allergies   Allergen Reactions    Morphine Rash       rash    Other Rash         Medication:   Home Medications           Prior to Admission medications    Medication Sig Start Date End Date Taking?  Authorizing Provider   rosuvastatin (CRESTOR) 20 MG tablet Take 1 tablet by mouth daily 8/13/20   Yes Cayetano Castleman, MD   isosorbide mononitrate (IMDUR) 30 MG extended release tablet TAKE ONE TABLET BY MOUTH DAILY 7/27/20   Yes Adali Alan MD   rivaroxaban (XARELTO) 20 MG TABS tablet TAKE ONE TABLET BY MOUTH DAILY WITH BREAKFAST 7/23/20   Yes Phoenix Rivera DO   albuterol sulfate HFA (PROAIR HFA) 108 (90 Base) MCG/ACT inhaler Inhale 2 puffs into the lungs every 4 hours as needed for Wheezing or Shortness of Breath 7/23/20   Yes Phoenix Rivera DO   busPIRone (BUSPAR) 5 MG tablet Take 1 tablet by mouth 2 times daily 3/13/20   Yes Blaine Suresh MD   amiodarone (CORDARONE) 200 MG tablet Take 1 tablet by mouth daily 3/4/20   Yes Tanya Vivar MD   Arformoterol Tartrate (BROVANA) 15 MCG/2ML NEBU Take 2 mLs by nebulization 2 times daily 2/29/20   Yes Rah Vivar MD   hydrALAZINE (APRESOLINE) 50 MG tablet Take 1 tablet by mouth every 8 hours 2/29/20   Yes Tanya Vivar MD   NIFEdipine (ADALAT CC) 30 MG extended release tablet Take 1 tablet by mouth daily 2/29/20   Yes Rah Vivar MD   torsemide (DEMADEX) 20 MG tablet Take 1 tablet by mouth daily 3/1/20   Yes Rah Vivar MD   albuterol (PROVENTIL) (2.5 MG/3ML) 0.083% nebulizer solution Take 3 mLs by nebulization every 4 hours as needed for Wheezing 2/17/20   Yes Phoenix Rivera DO   metoprolol succinate (TOPROL XL) 50 MG extended release tablet TAKE ONE TABLET BY MOUTH DAILY 11/27/19   Yes CHRISTINE Samuels CNP   budesonide (PULMICORT) 0.5 MG/2ML nebulizer suspension Take 2 mLs by nebulization 2 times daily 7/24/19   Yes Holden Bae, DO   formoterol (PERFOROMIST) 20 MCG/2ML nebulizer solution Take 2 mLs by nebulization 2 times daily 7/24/19   Yes Holden Bae DO   nitroGLYCERIN (NITROSTAT) 0.4 MG SL tablet Place 1 tablet under the tongue every 5 minutes as needed for Chest pain 3/5/19   Yes Vangie Mcdonough MD   Multiple Vitamins-Minerals (MULTI FOR HER 50+ PO) Take 1 tablet by mouth daily     Yes Historical Provider, MD   clonazePAM (KLONOPIN) 0.5 MG tablet Take 1 tablet by mouth nightly as needed (sleep) for up to 5 days. 2/20/20 8/10/20   Vangie Mcdonough MD           Social History:   reports that she quit smoking about 6 years ago. Her smoking use included cigarettes. She started smoking about 43 years ago. She has a 37.00 pack-year smoking history. She has never used smokeless tobacco. She reports that she does not drink alcohol or use drugs.         Family History:  family history includes Heart Disease in her father; Hypertension in an other family member. Reviewed. Denies family history of sudden cardiac death, arrhythmia, premature CAD     Review of System:     · General ROS: negative for - chills, fever   · Psychological ROS: negative for - anxiety or depression  · Ophthalmic ROS: negative for - eye pain or loss of vision  · ENT ROS: negative for - epistaxis, headaches, nasal discharge, sore throat   · Allergy and Immunology ROS: negative for - hives, nasal congestion   · Hematological and Lymphatic ROS: negative for - bleeding problems, blood clots, bruising or jaundice  · Endocrine ROS: negative for - skin changes, temperature intolerance or unexpected weight changes  · Respiratory ROS: negative for - cough, hemoptysis, pleuritic pain, sputum changes or wheezing. + SOB  · Cardiovascular ROS: Per HPI.    · Gastrointestinal ROS: negative for - abdominal pain, blood in stools, diarrhea, hematemesis, melena, nausea/vomiting or swallowing difficulty/pain  · Genito-Urinary ROS: negative for - anterolateral walls    which is consistent with ischemia. There is breast attenuation but stress    images appear worse.    Normal LV size and systolic function.    Overall findings represent an intermediate risk study         4. Cath: 3/5/18  OVERALL IMPRESSION  1.  Again, 100% proximal right coronary artery occlusion with left to right collaterals. 2.  Mild disease of the distal left main trunk. 3.  20% to 30% ostial left anterior descending artery stenosis with collaterals from LAD to the right coronary artery. 4.  30% to 40% stenosis of the proximal circumflex artery. 5.  Normal left ventricular systolic function with estimated EF of 55% to 60%. 6.  Slightly enlarged aortic root with no evidence of aortic stenosis or regurgitation.     In view of the above findings, we will okay the patient for her upcoming  aortic aneurysm surgery from cardiac standpoint.     [facility]: ([facility]) 4/2017   of RCA chronic LAD 10-20% RA 4 PA24/9 16 wedge 9 LVEDP markedly elevated 30     Right Heart Cath 2/28/2020  1.  _____ normal right cardiac pressure. 2.  Elevated pulmonary capillary wedge pressure with a mean pulmonary  capillary wedge of 29 mmHg. 3.  Normal cardiac output and indices. 4.  No oxygen step off to suggest ASD/PFO.        6. Carotid US 10/2017 at [facility]:  1. Estimated diameter reduction of the right internal carotid artery is  less than 50%. 2.  Estimated diameter reduction of the left internal carotid artery is  50-69%. 3.  The bilateral common carotid arteries reveal no evidence of   significant stenosis. 4.  There is greater than 50% stenosis of the right external carotid  artery. 5.  There is no evidence of significant stenosis in the left external  carotid artery. 6.  The bilateral vertebral arteries are patent with antegrade flow. 7.  The bilateral subclavian arteries reveal no evidence of significant  stenosis.   8.  There has been no significant change from the previous study of  4/21/2017.     The MCOT, echocardiogram, stress test, and coronary angiography/PCI were reviewed by myself and used for my plan of care. Procedures:  1. Endovascular AAA repair on 3/21/18 by Dr. Herrera Rangel     Assessment/Plan:      Atrial fibrillation  She has a [id number] Score 3 (HTN, AGE, female gender)  On Xarelto 20 mg daily for  thromboembolic risk reduction. Tolerating well no signs or symptoms of abnormal bruising or bleeding. Stop Amiodarone today.     We educated the patient that atrial fibrillation is a progressive disease, with more frequent episodes that will ensue. Subsequent episodes usually become more sustained to the extent that many individuals would then develop persistent atrial fibrillation. Once persistence is reached, permanent atrial fibrillation is inevitable. We discussed different management options for atrial fibrillation including their risks and benefits. These options include use of cardioversion (mainly for persisting atrial fibrillation or atrial flutter) which provides an effective immediate therapy with success rates of 75% or higher, but it provides no short nor long term efficacy.  Anti-arrhythmic medications provide a very effective short term therapy, but even with our most potent anti-arrhythmic medication there is limited long term efficacy (clinical studies have shown that 40% of patients remain atrial fibrillation-free after 4 years of follow-up after starting one of the more powerful anti-arrhythmic medication (amiodarone), and, if extrapolated, may have further diminishing success as time goes on). Atrial fibrillation ablation is a potentially curative therapy with very reasonable success rate after a first time procedure and with improving success rates with subsequent procedures.      The risks, benefits and alternatives of the atrial fibrillation ablation procedure were discussed with the patient.  The risks including, but not limited to, bleeding, infection, radiation exposure, injury to vascular, cardiac and surrounding structures (including pneumothorax), stroke, cardiac perforation, tamponade, need for emergent open heart surgery, need for pacemaker implantation, injury to the phrenic nerve, injury to the esophagus, myocardial infarction and death were discussed in detail. The patient was also counseled at length about the risks of abhay Covid-19 in the niesha-operative and post-operative states including the recovery window of their procedure. The patient was made aware that abhay Covid-19 after a surgical procedure may worsen their prognosis for recovering from the virus and lend to a higher morbidity and or mortality risk. The patient was given the option of postponing their procedure.      The patient opted to proceed with the atrial fibrillation ablation. We will schedule for a radiofrequency ablation with [employer] system with a KAYE procedure immediately prior to this ablation. A cardiac CTA will be ordered for pulmonary vein mapping prior to this procedure. We will hold Xarelto  for 12 hours prior to procedure. We will order BMP, CBC, PT/INR, and Type & Screen prior to the procedure.      -Much time was spent counseling the patient on healthier lifestyle, following a low sodium diet <2000 mg of sodium a day and dramatically decreasing the intake of processed sugar.     -Encouraged to watch \"That Sugar Film\" on [employer], which discusses the negative impact of processed sugar has on the body.     Chronic diastolic heart failure  NYHA class II-III. Appears well compensated on today's exam no signs or symptoms of fluid volume overload. Not on Aldactone due to elevated Creatine. Stop Toprol XL today start Carvedilol 12.5 mg BID for added BP control.   On DiureticTorsemide.     Coronary artery disease involving native coronary artery of native heart without angina pectoris  Denies angina  -Risk factor management: high blood pressure, high cholesterol,

## 2020-10-07 NOTE — PROCEDURES
[address]     Electrophysiology Procedure Note       Date of Procedure: 10/7/2020  Patient's Name: Rylie Diaz  YOB: 1945   Medical Record Number: [id number]  Referring Physician: Jaden Li MD  Procedure Performed by: Girma Styles MD    Procedure performed:  · Electrophysiology study with radiofrequency ablation of atrial fibrillation and pulmonary vein isolation   · Additional ablation with creation of a roof line (for left atrial flutter with CL 202ms with louogveb-xc-hcqbft activation) and anterior line from mitral annulus to the roof (for left atrial flutter with CL 223ms with gdjtfd-zp-reqwaxfi activation)  · 3-D electroanatomical mapping of the left atrium    · Transseptal puncture through an intact septum x 2 under intracardiac ultrasound guidance without fluoroscopic guidance   · Intracardiac echocardiography  · Left ventricular pacing and recording  · Drug infusion with an attempt to induce atrial tachydysrhythmia  · Transesophageal echocardiogram  · Anesthesia: General anesthesia provided by the Anesthesia service  · (there were no independent trained observers who had no other duties involved in this procedure)    Indications for procedure:    Rylie Diaz is a 77 y.o. female who has a history of persistent atrial fibrillation who is symptomatic with symptoms of dyspnea with minimal exertion and fatigue who has failed antiarrhythmic therapy in the past is now here for an ablation for persistent atrial fibrillation. Details of Procedure: The risks, benefits and alternatives of the ablation procedure were discussed with the patient.  The risks including, but not limited to, the risks of bleeding, infection, radiation exposure, injury to vascular, cardiac and surrounding structures (including pneumothorax), stroke, cardiac perforation, tamponade, need for emergent open heart surgery, need for pacemaker implantation, esophageal injury and fistula, myocardial infarction and death were discussed in detail. The patient was also counseled at length about the risks of abhay Covid-19 in the niesha-operative and post-operative states including the recovery window of their procedure. The patient was made aware that abhay Covid-19 after a surgical procedure may worsen their prognosis for recovering from the virus and lend to a higher morbidity and or mortality risk. The patient was given the option of postponing their procedure. The patient opted to proceed with the ablation. Written informed consent was signed and placed in the chart. Patient was brought to the EP lab in a fasting non-sedate state. Patient underwent general anesthesia by anesthesia team. The patient was monitored continuously with ECG, pulse oximetry, blood pressure monitoring, and direct observation. No urinary woodruff was placed in order to prevent any hematuria or urinary tract infection. We initially performed a transesophageal echo that showed no left atrial appendage clot/thrombus. Both groins were prepped in a sterile fashion. We gained access in the right femoral vein. One 8 Wolof short sheath for ICE and subsequently for CS catheters were placed in the right femoral vein using modified seldinger technique. Two 8.5F SLO sheaths were introduced into the right femoral vein using modified Seldinger technique. Then a CS cathter was placed inside the coronary sinus without fluoroscopy but with 3-D electroanatomic mapping for recording and mapping of the left atrium. Using ICE we delineated the left pulmonary vein, left atrial appendage,  mitral valve, and right superior and right inferior pulmonary veins, and the trivial amount of pericardial effusion prior to ablation.      Two transeptal punctures were attempted, but a patent foramen ovale was found through which our two long SLO 8.5Fr sheaths traversed the interatrial septum and was advanced into the left atrium under intracardiac echocardiogram, pressure monitoring, and without fluoroscopic guidance. Patient received a bolus of heparin shortly after each transseptal access followed by continuous monitoring of the ACT every 15-30 minutes, and additional boluses of heparin during the procedure to keep the ACT between 300-400 sec. We placed both SLO sheaths inside the left atrium. Also an esophageal temperature probe ([employer] Temperature Probe 12Fr) that was tied with sutures to an accompanying [employer] quadripolar 6 Uzbek 5-5-5 electrode spacing catheter was advanced into the esophagus for real-time mapping of the esophagus and careful monitoring of the esophageal temperature during ablation. Using the Penta ray cathter and [employer] navigation system a three dimensional electro anatomical mapping of the left atrium, in addition to right and left sided pulmonary vein anatomy was created. During atrial fibrillation rhythm, we found that all four pulmonary veins (left superior, left inferior, right superior, and right inferior) had PV fascicles, the triggers for the atrial fibrillation. We then proceeded with a pulmonary vein isolation via ablation. Wide area circumferential ablations for the left sided pulmonary veins were performed which resulted in electrical isolation of these pulmonary veins and eradication of the PV fascicles. As we performed the left antrum ablations, the patient's atrial fibrillation organized into left atrial flutter with  ms with xhdkep-zi-dmzlcgmf activation. Given this activation pattern, it is most likely mitral isthmus dependent. Within 4-5 minutes, the left atrial flutter degenerated to atrial fibrillation. Similarly, wide area circumferential ablations for the right sided pulmonary veins were performed which resulted in electrical isolation of these pulmonary veins and eradication of the PV fascicles.  As we were performing the R antrum ablation, the patient's atrial fibrillation organized into left atrial flutter with CL 202ms with a bnbzeing-gm-opzagg activation, most likely an OY-vtud-bxewhtcni atrial flutter. After completing both the R and L antrums, the patient's atrial fibrillation terminated to sinus rhythm for the remainder of the procedure. Given the AI-dmak-smsnovvrw LA flutter of CL 202ms with gozvzkmq-ms-qhqmlf activation, we proceeded with a linear ablation line along the roof of the left atrium. Given that the patient also manifested mitral isthmus dependent fluttter with CL 223ms with nfbypi-rm-jfzvtnov activation, we proceeded with a linear ablation on the anterior wall of the LA connecting the mitral annulus to the roof. After ablation was complete, catheters were placed in the left and right atrium, His-position, right ventricle, and left ventricle for pacing and recording. Arrhythmia was attempted to be induced by rapid atrial and ventricular pacing, and there was no induction of atrial tachydysrhythmia. Maximum output (20mA) pacing in the L antrum and R antrum were also performed and showed dissociation from the rest of the left atrium. This was checked circumferentially around the antrums and verified many times. We checked the roof line and found that there was complete, bidirectional block. The anterior line was checked and showed incomplete bidirectional block, as evidenced by a short transit time along the length of this line when pacing from the LA appendage. Adenosine bolus of 18mg was also given for each of the 4 pulmonary veins to assess for acute re-connection and to attempt to induce atrial fibrillation. We had adequate adenosine effect (AV blocks of > 7 seconds) and there were no pulmonary fascicles seen in any of the veins nor were there any atrial tachydysrhythmia induced.  We then administered dobutamine infusion up to 10 mcg/kg/min in order to achieve at least a 20% increase in heart rate from the basal heart rate to induce any atrial tachydysrhythmia, and there were no atrial tachydysrhythmia induced. We then performed an EP study and programmed stimulation using our CS catheter and ablation catheter to assess the cardiac conduction system and to attempt to induce atrial tachydysrhythmia. The ablation catheter were moved from the left atrium to the left ventricular apex and His bundle position. His bundle potentials was recorded and pacing and recordings were performed from right atrium, coronary sinus and LV apex with the following results:     Sinus cycle length was 1141 msec  FL interval was 179 msec  QRS duration was 82 msec  QT interval was 459 msec  AH interval was 78 msec  HV interval was 37 msec  Pacing from left atrium, 1:1 conduction over AV node with (AV wenckebach) was 460  msec  Pacing from left atrium, AV geno ERP was 500/400 msec   Pacing from LV apex, 1:1 retrograde conduction over AV node (VA wenckebach) was attempted but showed VA dissociation. Then both the ablation, Pentarray, and CS catheters were removed from the body, and all 3 sheaths were removed from the right femoral vein with a figure-of-eight suture that was placed to provide hemostasis while awaiting the downtrending of the ACT. Protamine 50mg IV x 1 was administered to partially reverse the IV heparin that was administered during the atrial fibrillation ablation procedure. Under intracardiac ultrasound guidance, we evaluated for pericardial effusion, and there was no evidence of such. Specimen collected: none    Estimated blood loss: < 50 cc    The patient tolerated the procedure well and there were no complications. Patient was extubated and transferred to the floor in stable condition.      Conclusion:     - Pre- and post-procedure diagnoses were persistent atrial fibrillation, left atrial flutter with CL 223ms with xtnqjm-is-sbpqnbms activation (mitral isthmus dependent), left atrial flutter with CL 202ms with lhhkspit-rn-nspwny activation (LA roof

## 2020-10-07 NOTE — PROGRESS NOTES
Pt arrived from PACU post ablation. Awake and alert, VSS, SR on tele. R groin with figure 8, site soft and no oozing noted. Post cath instructions reviewed with pt and , verbalized understanding. Assessment completed. Call light in reach, bed alarm on.  Will monitor  Electronically signed by Malcolm Michaud RN on 10/7/2020 at 7:55 PM

## 2020-10-07 NOTE — PROGRESS NOTES
Pt states she is having trouble breathing. Pt has clear lung sounds on auscultation. O2 sats 100% on 4 L of O2 nasal canula. Spoke with Dr. Robert Yoo about pt's status. Ordered duoneb. Continue to monitor.

## 2020-10-07 NOTE — PROGRESS NOTES
Pt awake, alert, and oriented. Remains feeling like she cannot breathe. Respiratory to give duoneb treatment. Pt states no pain in groin. Transparent dressing and figure 8 suture intact. Pt can wiggle toes, cap refill less than 3, pedal pulses palpable. Continue to monitor.

## 2020-10-07 NOTE — PROGRESS NOTES
Pt states she has pain when the site is touched but while laying flat there is no pain to site. Site is not swollen or red. Dressing intact. Continue to monitor.

## 2020-10-07 NOTE — ANESTHESIA POSTPROCEDURE EVALUATION
Department of Anesthesiology  Postprocedure Note    Patient: Aneta Dakins  MRN: [id number]  YOB: 1945  Date of evaluation: 10/7/2020  Time:  5:17 PM     Procedure Summary     Date:  10/07/20 Room / Location:  [facility] Cath Lab    Anesthesia Start:  0838 Anesthesia Stop:  9471    Procedure:  ECHOCARDIOGRAM TRANSESOPHAGEAL Diagnosis:       Paroxysmal atrial fibrillation (HCC)      Paroxysmal atrial fibrillation (HCC)      Paroxysmal atrial fibrillation ([address]      (prior to afib ablation.)    Scheduled Providers:   Responsible Provider:  Sadie Mcnamara MD    Anesthesia Type:  general ASA Status:  3          Anesthesia Type: general    Mode Phase I: Mode Score: 9    Mode Phase II:      Last vitals: Reviewed and per EMR flowsheets.        Anesthesia Post Evaluation    Patient location during evaluation: PACU  Patient participation: complete - patient participated  Level of consciousness: awake and alert  Pain score: 2  Airway patency: patent  Nausea & Vomiting: no nausea and no vomiting  Complications: no  Cardiovascular status: blood pressure returned to baseline  Respiratory status: acceptable  Hydration status: euvolemic

## 2020-10-08 VITALS
HEART RATE: 64 BPM | SYSTOLIC BLOOD PRESSURE: 160 MMHG | BODY MASS INDEX: 36.96 KG/M2 | RESPIRATION RATE: 18 BRPM | HEIGHT: 64 IN | DIASTOLIC BLOOD PRESSURE: 73 MMHG | WEIGHT: 216.49 LBS | TEMPERATURE: 97.7 F | OXYGEN SATURATION: 96 %

## 2020-10-08 PROBLEM — I48.0 PAROXYSMAL ATRIAL FIBRILLATION (HCC): Status: RESOLVED | Noted: 2020-10-07 | Resolved: 2020-10-08

## 2020-10-08 LAB
EKG ATRIAL RATE: 71 BPM
EKG DIAGNOSIS: NORMAL
EKG P AXIS: 69 DEGREES
EKG P-R INTERVAL: 192 MS
EKG Q-T INTERVAL: 452 MS
EKG QRS DURATION: 100 MS
EKG QTC CALCULATION (BAZETT): 491 MS
EKG R AXIS: -36 DEGREES
EKG T AXIS: 73 DEGREES
EKG VENTRICULAR RATE: 71 BPM

## 2020-10-08 PROCEDURE — 6360000002 HC RX W HCPCS: Performed by: INTERNAL MEDICINE

## 2020-10-08 PROCEDURE — 99238 HOSP IP/OBS DSCHRG MGMT 30/<: CPT | Performed by: NURSE PRACTITIONER

## 2020-10-08 PROCEDURE — 6370000000 HC RX 637 (ALT 250 FOR IP): Performed by: ANESTHESIOLOGY

## 2020-10-08 PROCEDURE — 2580000003 HC RX 258: Performed by: INTERNAL MEDICINE

## 2020-10-08 PROCEDURE — 93010 ELECTROCARDIOGRAM REPORT: CPT | Performed by: INTERNAL MEDICINE

## 2020-10-08 PROCEDURE — 90686 IIV4 VACC NO PRSV 0.5 ML IM: CPT | Performed by: INTERNAL MEDICINE

## 2020-10-08 PROCEDURE — 94761 N-INVAS EAR/PLS OXIMETRY MLT: CPT

## 2020-10-08 PROCEDURE — 94640 AIRWAY INHALATION TREATMENT: CPT

## 2020-10-08 PROCEDURE — G0008 ADMIN INFLUENZA VIRUS VAC: HCPCS | Performed by: INTERNAL MEDICINE

## 2020-10-08 PROCEDURE — 2700000000 HC OXYGEN THERAPY PER DAY

## 2020-10-08 PROCEDURE — 6370000000 HC RX 637 (ALT 250 FOR IP): Performed by: INTERNAL MEDICINE

## 2020-10-08 RX ORDER — FLECAINIDE ACETATE 50 MG/1
50 TABLET ORAL EVERY 12 HOURS SCHEDULED
Qty: 60 TABLET | Refills: 3 | Status: SHIPPED | OUTPATIENT
Start: 2020-10-08 | End: 2020-11-02

## 2020-10-08 RX ADMIN — ARFORMOTEROL TARTRATE 15 MCG: 15 SOLUTION RESPIRATORY (INHALATION) at 08:40

## 2020-10-08 RX ADMIN — TORSEMIDE 20 MG: 20 TABLET ORAL at 08:18

## 2020-10-08 RX ADMIN — INFLUENZA A VIRUS A/VICTORIA/2454/2019 IVR-207 (H1N1) ANTIGEN (PROPIOLACTONE INACTIVATED), INFLUENZA A VIRUS A/HONG KONG/2671/2019 IVR-208 (H3N2) ANTIGEN (PROPIOLACTONE INACTIVATED), INFLUENZA B VIRUS B/VICTORIA/705/2018 BVR-11 ANTIGEN (PROPIOLACTONE INACTIVATED), INFLUENZA B VIRUS B/PHUKET/3073/2013 BVR-1B ANTIGEN (PROPIOLACTONE INACTIVATED) 0.5 ML: 15; 15; 15; 15 INJECTION, SUSPENSION INTRAMUSCULAR at 08:19

## 2020-10-08 RX ADMIN — HYDRALAZINE HYDROCHLORIDE 50 MG: 50 TABLET, FILM COATED ORAL at 05:45

## 2020-10-08 RX ADMIN — BUDESONIDE 500 MCG: 0.5 SUSPENSION RESPIRATORY (INHALATION) at 08:35

## 2020-10-08 RX ADMIN — METOPROLOL SUCCINATE 50 MG: 50 TABLET, EXTENDED RELEASE ORAL at 08:18

## 2020-10-08 RX ADMIN — IPRATROPIUM BROMIDE AND ALBUTEROL SULFATE 1 AMPULE: .5; 3 SOLUTION RESPIRATORY (INHALATION) at 08:25

## 2020-10-08 RX ADMIN — NIFEDIPINE 60 MG: 60 TABLET, FILM COATED, EXTENDED RELEASE ORAL at 08:18

## 2020-10-08 RX ADMIN — ROSUVASTATIN CALCIUM 20 MG: 20 TABLET, FILM COATED ORAL at 08:18

## 2020-10-08 RX ADMIN — ISOSORBIDE MONONITRATE 30 MG: 30 TABLET, EXTENDED RELEASE ORAL at 08:18

## 2020-10-08 RX ADMIN — FLECAINIDE ACETATE 50 MG: 100 TABLET ORAL at 08:19

## 2020-10-08 RX ADMIN — Medication 10 ML: at 08:20

## 2020-10-08 RX ADMIN — BUSPIRONE HYDROCHLORIDE 5 MG: 5 TABLET ORAL at 08:18

## 2020-10-08 NOTE — PROGRESS NOTES
Figure 8 sutures removed without difficulty. Clean transparent dressing applied. Pt has slept for just small periods of time, and has denied any complaints.

## 2020-10-08 NOTE — PROGRESS NOTES
Cardiac Electrophysiology Progress Note     Admit Date: 10/7/2020     Reason for follow up: atrial fibrillation s/p ablation    HPI and Interval History:   The patient is a 77year old female with past medical history significant for persistent atrial fibrillation, AAA, CAD, HTN, COPD, PE, DVT and diastolic HF who presented to the hospital for scheduled ablation. She underwent EP study with radiofrequency ablation of atrial fibrillation and pulmonary vein isolation, additional ablation with creation of a roof line (for L flutter) and anterior line from mitral annulus to the roof (for L flutter) on 10/7/20 with Dr. Olivier Nava. She was started on flecainide 50mg BID, Toprol 50mg QD and Xarelto 20mg QD were continued. Doing well. Denies any CP or SOB. No palpitations. Some groin tenderness. Review of System:  · General: negative for fever, chills   · Ophthalmic ROS: negative for eye pain or loss of vision  · ENT ROS: negative for headaches, sore throat, nasal drainage  · Respiratory: negative for cough, sputum, SOB  · Cardiovascular: negative for chest pain, palpitations. · Gastrointestinal: negative for abdominal pain, diarrhea, N/V  · Hematology: negative for bleeding, blood clots, bruising or jaundice  · Genito-Urinary:  negative for dysuria or incontinence  · Musculoskeletal: Positive to tenderness to R groin,  negative for joint swelling, muscle pain  · Neurological: negative for confusion, dizziness, headaches   · Psychiatric: negative anxiety, depression  · Dermatological: negative for rash      Physical Examination:  Vitals:    10/08/20 0815   BP: (!) 160/73   Pulse: 64   Resp: 18   Temp: 97.7 °F (36.5 °C)   SpO2: 94%        Intake/Output Summary (Last 24 hours) at 10/8/2020 0829  Last data filed at 10/8/2020 0820  Gross per 24 hour   Intake 1700 ml   Output 1275 ml   Net 425 ml     In: 1300 [P.O.:1250;  I.V.:50]  Out: 1275    Wt Readings from Last 3 Encounters:   10/08/20 216 lb 7.9 oz (98.2 kg)   09/30/20 214 lb (97.1 kg)   20 217 lb 2.5 oz (98.5 kg)     Temp  Av.5 °F (36.4 °C)  Min: 96.8 °F (36 °C)  Max: 99.5 °F (37.5 °C)  Pulse  Av.9  Min: 63  Max: 76  BP  Min: 65/41  Max: 195/75  SpO2  Av.5 %  Min: 91 %  Max: 100 %  FiO2   Av.4 %  Min: 22 %  Max: 97 %    · Telemetry: Sinus rhythm in the 60s. · Constitutional: Alert, in no acute distress. Appears stated age. · Head: Normocephalic and atraumatic. · Eyes: Conjunctivae normal. EOM are normal.   · Neck: Neck supple. No lymphadenopathy. No rigidity. No JVD present. · Cardiovascular: Normal rate, regular rhythm. No murmurs, rubs or gallops. No S3 or S4. R femoral pulse 3+. · Pulmonary/Chest: Clear breath sounds bilaterally. No crackles, wheezes or rhonchi. No respiratory accessory muscle use. · Abdominal: Soft. Normal bowel sounds present. No distension, No tenderness. · Musculoskeletal: No tenderness. No edema    · Lymphadenopathy: Has no cervical adenopathy. · Neurological: Alert and oriented. No gross deficits. · Skin: No hematoma, redness or drainage to R groin. Skin is warm and dry. No rash, lesions, ulcerations noted. · Psychiatric: No anxiety or agitation. Labs, diagnostic and imaging results reviewed. Reviewed. No results for input(s): NA, K, CL, CO2, PHOS, BUN, CREATININE in the last 72 hours. Invalid input(s): CA  No results for input(s): WBC, HGB, HCT, MCV, PLT in the last 72 hours. Lab Results   Component Value Date    TROPONINI <0.01 2020     CrCl cannot be calculated (Patient's most recent lab result is older than the maximum 10 days allowed. ).    No results found for: BNP  Lab Results   Component Value Date    PROTIME 26.0 02/15/2020    PROTIME 11.3 2018    PROTIME 11.7 2018    INR 2.22 02/15/2020    INR 1.00 2018    INR 1.04 2018     Lab Results   Component Value Date    CHOL 155 2019    HDL 41 2019    HDL 38 2011    TRIG 118 2019       Scheduled cardiac output and indices. 4.  No oxygen step off to suggest ASD/PFO. Stress: 2/19/18  Conclusions          Summary     Abnormal study. There is a moderate sized, mild intensity, partially     reversible defect of the mid to apical anterior and mid anterolateral walls     which is consistent with ischemia. There is breast attenuation but stress     images appear worse.     Normal LV size and systolic function.     Overall findings represent an intermediate risk study.           Procedures:  1.  PVI, roof line, anterior line ablation 10/7/20, Dr. Inge Aguilar  - Pre- and post-procedure diagnoses were persistent atrial fibrillation, left atrial flutter with CL 223ms with [id number]-[id number] activation (mitral isthmus dependent), left atrial flutter with CL 202ms with gmyzdfuj-ci-jirztx activation (LA roof dependent)   - Pulmonary vein isolations using wide area circumferential radiofrequency ablation   - Additional ablation with creation of roof line (for left atrial flutter CL 202ms with lftjqioe-lr-xucrng activation) and anterior line from mitral annulus to the roof (for left atrial flutter with CL 223ms with othvgp-gr-sbxzfwvn activation)    Assessment and Plan:     Persistent atrial fibrillation   - s/p PVI ablation on 10/7/20 with Dr. Inge Aguilar   - continue flecainide 50mg BID, Toprol 50mg QD   - CHADS2-VASc 5 (age, gender, HTN, HF, CAD) on Xarelto 20mg QD    Left atrial flutter   - seen on EP study s/p roof line and anterior line ablations 10/7/20   - see above    Chronic diastolic heart failure   - EF 60%    CAD   - stable   - on statin, beta blocker    AAA   - s/p repair in 2018 by Dr. Radha Dailey    Benign HTN   - BP a little elevated, advised pt to monitor and if consistently elevated should let us or PCP know     CHRISTINE Wheat  [facility] [address] [place] [address], [address] [place] [address]  Phone: [phone]  Fax: [phone]    Electronically signed by Michele Morrissey APRN - CNP on 10/8/2020 at 8:29 AM

## 2020-10-08 NOTE — DISCHARGE SUMMARY
DISCHARGE SUMMARY  Patient ID:  Rosa Brownlee  [id number]  26 y.o.  1945    Admit date: 10/7/2020    Discharge date: 10/8/2020    Admitting Physician: Jaz Reynolds MD     Discharge Provider: CHRISTINE Miller     Admission Diagnoses: Paroxysmal atrial fibrillation [facility]) [I48.0]  Paroxysmal atrial fibrillation [facility]) [I48.0]    Discharge Diagnoses: Same    Discharged Condition: Good    Hospital Course: Rosa Brownlee was admitted on 10/7/2020 and underwent EP study with radiofrequency ablation of atrial fibrillation and pulmonary vein isolation, additional ablation with creation of a roof line (for L flutter) and anterior line from mitral annulus to the roof (for L flutter) on 10/7/20 with Dr. Leeann Baird. She was started on flecainide 50mg BID, Toprol 50mg QD and Xarelto 20mg QD were continued. She has remained in sinus rhythm overnight. The patient had no complications post-procedure. Her groin site this morning is unremarkable with no redness, bruising or drainage noted. Post-ablation medications and activity limitations were discussed.      Procedures:  10/7/20  · Electrophysiology study with radiofrequency ablation of atrial fibrillation and pulmonary vein isolation   · Additional ablation with creation of a roof line (for left atrial flutter with CL 202ms with pnprhpir-gk-exjapa activation) and anterior line from mitral annulus to the roof (for left atrial flutter with CL 223ms with zcocrz-vk-xqptmifs activation)  · 3-D electroanatomical mapping of the left atrium    · Transseptal puncture through an intact septum x 2 under intracardiac ultrasound guidance without fluoroscopic guidance   · Intracardiac echocardiography  · Left ventricular pacing and recording  · Drug infusion with an attempt to induce atrial tachydysrhythmia  · Transesophageal echocardiogram  · Anesthesia: General anesthesia provided by the Anesthesia service    Labs:   Lab Results   Component Value Date    CREATININE 1.2 09/25/2020 BUN 27 (H) 09/25/2020     09/25/2020    K 4.4 09/25/2020    CL 98 (L) 09/25/2020    CO2 25 09/25/2020      Lab Results   Component Value Date    WBC 8.0 09/25/2020    HGB 13.8 09/25/2020    HCT 42.7 09/25/2020    MCV 88.1 09/25/2020     09/25/2020      Lab Results   Component Value Date    INR 2.22 (H) 02/15/2020    PROTIME 26.0 (H) 02/15/2020    No results found for: BNP    Please refer to today's progress note for additional information.     Disposition: home    Patient Instructions:   Current Discharge Medication List      START taking these medications    Details   flecainide (TAMBOCOR) 50 MG tablet Take 1 tablet by mouth every 12 hours  Qty: 60 tablet, Refills: 3         CONTINUE these medications which have NOT CHANGED    Details   NIFEdipine (ADALAT CC) 60 MG extended release tablet Take 1 tablet by mouth daily  Qty: 30 tablet, Refills: 1      hydrOXYzine (VISTARIL) 25 MG capsule Take 1 capsule by mouth 2 times daily as needed for Anxiety  Qty: 30 capsule, Refills: 0      metoprolol succinate (TOPROL XL) 50 MG extended release tablet Take 1 tablet by mouth daily  Qty: 90 tablet, Refills: 3      rosuvastatin (CRESTOR) 20 MG tablet Take 1 tablet by mouth daily  Qty: 30 tablet, Refills: 11      isosorbide mononitrate (IMDUR) 30 MG extended release tablet TAKE ONE TABLET BY MOUTH DAILY  Qty: 90 tablet, Refills: 5      rivaroxaban (XARELTO) 20 MG TABS tablet TAKE ONE TABLET BY MOUTH DAILY WITH BREAKFAST  Qty: 30 tablet, Refills: 5      busPIRone (BUSPAR) 5 MG tablet Take 1 tablet by mouth 2 times daily  Qty: 60 tablet, Refills: 1      Arformoterol Tartrate (BROVANA) 15 MCG/2ML NEBU Take 2 mLs by nebulization 2 times daily  Qty: 120 mL, Refills: 3      hydrALAZINE (APRESOLINE) 50 MG tablet Take 1 tablet by mouth every 8 hours  Qty: 90 tablet, Refills: 3      torsemide (DEMADEX) 20 MG tablet Take 1 tablet by mouth daily  Qty: 30 tablet, Refills: 3      albuterol (PROVENTIL) (2.5 MG/3ML) 0.083% nebulizer

## 2020-10-08 NOTE — PROGRESS NOTES
Written discharge instructions including diet, activity, weight monitoring, what to do if symptoms worsen and the importance of follow up care and need to call his/her physician for  an appointment were reviewed with the patient who verbalized understanding. These written instructions were given to the patient to take home and a copy was made for the medical record.  Electronically signed by Oswald Rogers RN on 10/8/2020 at 9:52 AM

## 2020-10-16 RX ORDER — TORSEMIDE 20 MG/1
20 TABLET ORAL DAILY
Qty: 90 TABLET | Refills: 4 | Status: SHIPPED | OUTPATIENT
Start: 2020-10-16 | End: 2021-01-29 | Stop reason: DRUGHIGH

## 2020-10-16 NOTE — TELEPHONE ENCOUNTER
Medication Refill    Medication needing refilled:  torsemide (DEMADEX)     Dosage of the medication:  20 MG tablet    How are you taking this medication (QD, BID, TID, QID, PRN): Take 1 tablet by mouth daily      30 or 90 day supply called in:  30 days     Which Pharmacy are we sending the medication to?:  [address], [address] [facility]  [phone] - F [phone]       States she had not been taking this medication every day as instructed and now she feels the effect of not taking the mediation as prescribed       Last saw Oriana Mauricio on  9/30/2020  Follow up with [facility] s/[facility] [place] on 1/7/2021    If we have any questions we can call her at [phone]

## 2020-10-20 ENCOUNTER — CARE COORDINATION (OUTPATIENT)
Dept: CASE MANAGEMENT | Age: 75
End: 2020-10-20

## 2020-10-20 NOTE — CARE COORDINATION
[facility] Transitions Follow Up Call    10/20/2020    Patient: Hampton Bumpers  Patient : 1945   MRN: [id number]  Reason for Admission: COPD exacerbation & CHF  Discharge Date: 10/8/20 RARS: Readmission Risk Score: 13           Needs to be reviewed by the provider   Additional needs identified to be addressed with provider No  none  Discussed COVID-19 related testing which was not done at this time. Test results were not done. Patient informed of results, if available? Not done         Method of communication with provider : none    Care Transition Nurse (CTN) contacted the patient by telephone to follow up after admission on 2020. Verified name and  with patient as identifiers. Addressed changes since last contact: symptom management-SOB  Discharged needs reviewed: none  Follow up appointment completed? Yes    CTN reviewed discharge instructions, medical action plan and red flags with patient and discussed any barriers to care and/or understanding of plan of care after discharge. Discussed appropriate site of care based on symptoms and resources available to patient including: PCP and Specialist. The patient agrees to contact the PCP office for questions related to their healthcare. Plan for follow-up call in 3-5 days based on severity of symptoms and risk factors. Plan for next call: symptom management-SOB  CTN provided contact information for future needs. Care Transitions Subsequent and Final Call    Subsequent and Final Calls  Do you have any ongoing symptoms?:  No  Have your medications changed?:  No  Do you have any questions related to your medications?:  No  Do you currently have any active services?:  No  Do you have any needs or concerns that I can assist you with?:  No  Identified Barriers:  Stress  Care Transitions Interventions  Other Interventions:          Spoke with Ender Layne. She states she has an appointment with her PCP tomorrow.  She c/o back pain which she states is new. Pooja Lloyd states her weight today = 208lbs, which is down from 209 lbs yesterday. She denies any SOB, chest pain, or edema. Pooja Lloyd states she would place herself in the \"green\" heart failure zone today. She states she is using her hand held nebulizer 3 times per day. Pooja Lloyd denies any needs today. She is agreeable to follow up from CTN. She has CTN contact info.   Follow Up  Future Appointments   Date Time Provider Kiana Puri   10/22/2020  9:00 AM Rhiannon Quigley MD [facility]   11/2/2020 10:00 AM DO DAQUAN Davis [facility]   1/7/2021  1:40 PM CHRISTINE Fregoso - CNP [facility]   1/11/2021  1:30 PM Rhiannon Quigley MD [facility]   1/29/2021 11:00 AM MD Bob Parks [facility]       Stoney Babinski, RN

## 2020-10-22 ENCOUNTER — OFFICE VISIT (OUTPATIENT)
Dept: FAMILY MEDICINE CLINIC | Age: 75
End: 2020-10-22
Payer: MEDICARE

## 2020-10-22 VITALS
HEART RATE: 72 BPM | SYSTOLIC BLOOD PRESSURE: 134 MMHG | DIASTOLIC BLOOD PRESSURE: 82 MMHG | BODY MASS INDEX: 36.37 KG/M2 | HEIGHT: 64 IN | WEIGHT: 213 LBS | TEMPERATURE: 98.1 F

## 2020-10-22 PROCEDURE — G8482 FLU IMMUNIZE ORDER/ADMIN: HCPCS | Performed by: FAMILY MEDICINE

## 2020-10-22 PROCEDURE — 4040F PNEUMOC VAC/ADMIN/RCVD: CPT | Performed by: FAMILY MEDICINE

## 2020-10-22 PROCEDURE — 99213 OFFICE O/P EST LOW 20 MIN: CPT | Performed by: FAMILY MEDICINE

## 2020-10-22 PROCEDURE — 1090F PRES/ABSN URINE INCON ASSESS: CPT | Performed by: FAMILY MEDICINE

## 2020-10-22 PROCEDURE — G8417 CALC BMI ABV UP PARAM F/U: HCPCS | Performed by: FAMILY MEDICINE

## 2020-10-22 PROCEDURE — 1036F TOBACCO NON-USER: CPT | Performed by: FAMILY MEDICINE

## 2020-10-22 PROCEDURE — 1111F DSCHRG MED/CURRENT MED MERGE: CPT | Performed by: FAMILY MEDICINE

## 2020-10-22 PROCEDURE — G8399 PT W/DXA RESULTS DOCUMENT: HCPCS | Performed by: FAMILY MEDICINE

## 2020-10-22 PROCEDURE — 1123F ACP DISCUSS/DSCN MKR DOCD: CPT | Performed by: FAMILY MEDICINE

## 2020-10-22 PROCEDURE — G8427 DOCREV CUR MEDS BY ELIG CLIN: HCPCS | Performed by: FAMILY MEDICINE

## 2020-10-22 PROCEDURE — 3288F FALL RISK ASSESSMENT DOCD: CPT | Performed by: FAMILY MEDICINE

## 2020-10-22 PROCEDURE — 3017F COLORECTAL CA SCREEN DOC REV: CPT | Performed by: FAMILY MEDICINE

## 2020-10-22 RX ORDER — ESCITALOPRAM OXALATE 5 MG/1
5 TABLET ORAL DAILY
Qty: 30 TABLET | Refills: 3 | Status: SHIPPED | OUTPATIENT
Start: 2020-10-22 | End: 2020-11-02

## 2020-10-22 ASSESSMENT — ENCOUNTER SYMPTOMS: SHORTNESS OF BREATH: 0

## 2020-10-22 NOTE — PROGRESS NOTES
Subjective:      Patient ID: Nya Caba is a 77 y.o. female. Patient presents with:  Check-Up: hypertension, lipids- pt is fasting  Fatigue    She is overall well  She is fatigued  Would like to have ears check as they feel plugged  No pain    meds have been changed   Recently in the hospital for A Fib  She was started on flecainide toprol and xarelto.     YOB: 1945    Date of Visit:  10/22/2020     -- Morphine -- Rash    --  rash   -- Other -- Rash    Current Outpatient Medications:  torsemide (DEMADEX) 20 MG tablet, Take 1 tablet by mouth daily, Disp: 90 tablet, Rfl: 4  flecainide (TAMBOCOR) 50 MG tablet, Take 1 tablet by mouth every 12 hours, Disp: 60 tablet, Rfl: 3  NIFEdipine (ADALAT CC) 60 MG extended release tablet, Take 1 tablet by mouth daily, Disp: 30 tablet, Rfl: 1  hydrOXYzine (VISTARIL) 25 MG capsule, Take 1 capsule by mouth 2 times daily as needed for Anxiety, Disp: 30 capsule, Rfl: 0  metoprolol succinate (TOPROL XL) 50 MG extended release tablet, Take 1 tablet by mouth daily, Disp: 90 tablet, Rfl: 3  rosuvastatin (CRESTOR) 20 MG tablet, Take 1 tablet by mouth daily, Disp: 30 tablet, Rfl: 11  isosorbide mononitrate (IMDUR) 30 MG extended release tablet, TAKE ONE TABLET BY MOUTH DAILY, Disp: 90 tablet, Rfl: 5  rivaroxaban (XARELTO) 20 MG TABS tablet, TAKE ONE TABLET BY MOUTH DAILY WITH BREAKFAST, Disp: 30 tablet, Rfl: 5  albuterol sulfate HFA (PROAIR HFA) 108 (90 Base) MCG/ACT inhaler, Inhale 2 puffs into the lungs every 4 hours as needed for Wheezing or Shortness of Breath, Disp: 1 Inhaler, Rfl: 11  busPIRone (BUSPAR) 5 MG tablet, Take 1 tablet by mouth 2 times daily, Disp: 60 tablet, Rfl: 1  Arformoterol Tartrate (BROVANA) 15 MCG/2ML NEBU, Take 2 mLs by nebulization 2 times daily, Disp: 120 mL, Rfl: 3  hydrALAZINE (APRESOLINE) 50 MG tablet, Take 1 tablet by mouth every 8 hours, Disp: 90 tablet, Rfl: 3  albuterol (PROVENTIL) (2.5 MG/3ML) 0.083% nebulizer solution, Take 3 mLs by nebulization every 4 hours as needed for Wheezing, Disp: 120 mL, Rfl: 5  budesonide (PULMICORT) 0.5 MG/2ML nebulizer suspension, Take 2 mLs by nebulization 2 times daily, Disp: 360 mL, Rfl: 3  formoterol (PERFOROMIST) 20 MCG/2ML nebulizer solution, Take 2 mLs by nebulization 2 times daily, Disp: 120 mL, Rfl: 3  nitroGLYCERIN (NITROSTAT) 0.4 MG SL tablet, Place 1 tablet under the tongue every 5 minutes as needed for Chest pain, Disp: 25 tablet, Rfl: 1  Multiple Vitamins-Minerals (MULTI FOR HER 50+ PO), Take 1 tablet by mouth daily, Disp: , Rfl:     No current facility-administered medications for this visit.       ---------------------------               10/22/20                      0849         ---------------------------   BP:          134/82         Site:    Left Upper Arm     Position:     Sitting        Cuff Size:   Large Adult      Pulse:         72           Temp:   98.1 °F (36.7 °C)   TempSrc:    Temporal        Weight: 213 lb (96.6 kg)    Height:  5' 4\" (1.626 m)   ---------------------------  Body mass index is 36.56 kg/m². Wt Readings from Last 3 Encounters:  10/22/20 : 213 lb (96.6 kg)  10/08/20 : 216 lb 7.9 oz (98.2 kg)  09/30/20 : 214 lb (97.1 kg)    BP Readings from Last 3 Encounters:  10/22/20 : 134/82  10/08/20 : (!) 160/73  10/07/20 : (!) 120/57                Review of Systems   Constitutional: Negative for appetite change, chills, fever and unexpected weight change. Respiratory: Negative for shortness of breath. Will get sob with exertion and it has improved since the hospital stay    Cardiovascular: Negative for chest pain and palpitations. Genitourinary: Negative for difficulty urinating, dysuria and hematuria. Objective:   Physical Exam  Constitutional:       General: She is not in acute distress. Appearance: Normal appearance. She is well-developed. She is not ill-appearing or diaphoretic.    HENT:      Head: Normocephalic and

## 2020-10-22 NOTE — PATIENT INSTRUCTIONS
Use the new medicine for the anxiety  Stop the hydroxyzine and the buspirone   See me back in about march

## 2020-10-23 ENCOUNTER — CARE COORDINATION (OUTPATIENT)
Dept: CASE MANAGEMENT | Age: 75
End: 2020-10-23

## 2020-10-25 PROBLEM — N39.0 UTI (URINARY TRACT INFECTION): Status: RESOLVED | Noted: 2020-09-25 | Resolved: 2020-10-25

## 2020-11-02 ENCOUNTER — OFFICE VISIT (OUTPATIENT)
Dept: PULMONOLOGY | Age: 75
End: 2020-11-02
Payer: MEDICARE

## 2020-11-02 VITALS
HEIGHT: 64 IN | DIASTOLIC BLOOD PRESSURE: 80 MMHG | SYSTOLIC BLOOD PRESSURE: 132 MMHG | BODY MASS INDEX: 36.37 KG/M2 | WEIGHT: 213 LBS | OXYGEN SATURATION: 93 % | TEMPERATURE: 98.7 F | RESPIRATION RATE: 16 BRPM | HEART RATE: 59 BPM

## 2020-11-02 PROCEDURE — G8926 SPIRO NO PERF OR DOC: HCPCS | Performed by: INTERNAL MEDICINE

## 2020-11-02 PROCEDURE — 3017F COLORECTAL CA SCREEN DOC REV: CPT | Performed by: INTERNAL MEDICINE

## 2020-11-02 PROCEDURE — G8417 CALC BMI ABV UP PARAM F/U: HCPCS | Performed by: INTERNAL MEDICINE

## 2020-11-02 PROCEDURE — 99213 OFFICE O/P EST LOW 20 MIN: CPT | Performed by: INTERNAL MEDICINE

## 2020-11-02 PROCEDURE — 1123F ACP DISCUSS/DSCN MKR DOCD: CPT | Performed by: INTERNAL MEDICINE

## 2020-11-02 PROCEDURE — 1090F PRES/ABSN URINE INCON ASSESS: CPT | Performed by: INTERNAL MEDICINE

## 2020-11-02 PROCEDURE — 1111F DSCHRG MED/CURRENT MED MERGE: CPT | Performed by: INTERNAL MEDICINE

## 2020-11-02 PROCEDURE — G8482 FLU IMMUNIZE ORDER/ADMIN: HCPCS | Performed by: INTERNAL MEDICINE

## 2020-11-02 PROCEDURE — 1036F TOBACCO NON-USER: CPT | Performed by: INTERNAL MEDICINE

## 2020-11-02 PROCEDURE — 4040F PNEUMOC VAC/ADMIN/RCVD: CPT | Performed by: INTERNAL MEDICINE

## 2020-11-02 PROCEDURE — G8399 PT W/DXA RESULTS DOCUMENT: HCPCS | Performed by: INTERNAL MEDICINE

## 2020-11-02 PROCEDURE — G8427 DOCREV CUR MEDS BY ELIG CLIN: HCPCS | Performed by: INTERNAL MEDICINE

## 2020-11-02 PROCEDURE — 3023F SPIROM DOC REV: CPT | Performed by: INTERNAL MEDICINE

## 2020-11-02 RX ORDER — ALBUTEROL SULFATE 90 UG/1
2 AEROSOL, METERED RESPIRATORY (INHALATION) EVERY 4 HOURS PRN
Qty: 1 INHALER | Refills: 11 | Status: SHIPPED
Start: 2020-11-02 | End: 2021-01-29 | Stop reason: SDUPTHER

## 2020-11-02 NOTE — PROGRESS NOTES
Chief complaint  This is a 77y.o. year old female  who comes to see me with a chief complaint of   Chief Complaint   Patient presents with    COPD     follow up        HPI  Here with CC of COPD    She is doing ok. Had ablation recently with some improvement in breathing but still SOB. Requesting a new albuterol inhalers. Remains on pulmicort and brovana nebs. Did 6 MWT back in Sept and did not qualify for oxygen. She did get flu shot. Feels ok otherwise       Past Medical History:   Diagnosis Date    AAA (abdominal aortic aneurysm) ([address]     pt states it is 4cm    AAA (abdominal aortic aneurysm) without rupture (HCC) 2/10/2015    Atrial fibrillation (HCC)     CAD (coronary artery disease)     CHF (congestive heart failure) ([address]     COPD (chronic obstructive pulmonary disease) (HCC)     History of blood clots     Hyperlipidemia     Hypertension        Past Surgical History:   Procedure Laterality Date    ABDOMINAL AORTIC ANEURYSM REPAIR      Endovascular abdominal AA    APPENDECTOMY  1990    incidental    BLADDER SUSPENSION      CATARACT REMOVAL      CHOLECYSTECTOMY  10/15/13    COLONOSCOPY  9/2/07    dr Kadi Bucio and check in 5 years.  COLONOSCOPY  06/16/2017    ok dr arndt, repeat 5 years   [address]    for benign tumor.  just the uterus    JOINT REPLACEMENT  12/2013    right knee replacement    TONSILLECTOMY  as a child    TUMOR EXCISION      benign behind right ear about 2008     Current Outpatient Medications   Medication Sig Dispense Refill    albuterol sulfate HFA (PROAIR HFA) 108 (90 Base) MCG/ACT inhaler Inhale 2 puffs into the lungs every 4 hours as needed for Wheezing or Shortness of Breath 1 Inhaler 11    torsemide (DEMADEX) 20 MG tablet Take 1 tablet by mouth daily 90 tablet 4    NIFEdipine (ADALAT CC) 60 MG extended release tablet Take 1 tablet by mouth daily 30 tablet 1    metoprolol succinate (TOPROL XL) 50 MG extended release tablet Take 1 tablet by mouth daily 90 tablet 3    rosuvastatin (CRESTOR) 20 MG tablet Take 1 tablet by mouth daily 30 tablet 11    isosorbide mononitrate (IMDUR) 30 MG extended release tablet TAKE ONE TABLET BY MOUTH DAILY 90 tablet 5    rivaroxaban (XARELTO) 20 MG TABS tablet TAKE ONE TABLET BY MOUTH DAILY WITH BREAKFAST 30 tablet 5    albuterol sulfate HFA (PROAIR HFA) 108 (90 Base) MCG/ACT inhaler Inhale 2 puffs into the lungs every 4 hours as needed for Wheezing or Shortness of Breath 1 Inhaler 11    Arformoterol Tartrate (BROVANA) 15 MCG/2ML NEBU Take 2 mLs by nebulization 2 times daily 120 mL 3    hydrALAZINE (APRESOLINE) 50 MG tablet Take 1 tablet by mouth every 8 hours 90 tablet 3    albuterol (PROVENTIL) (2.5 MG/3ML) 0.083% nebulizer solution Take 3 mLs by nebulization every 4 hours as needed for Wheezing 120 mL 5    budesonide (PULMICORT) 0.5 MG/2ML nebulizer suspension Take 2 mLs by nebulization 2 times daily 360 mL 3    formoterol (PERFOROMIST) 20 MCG/2ML nebulizer solution Take 2 mLs by nebulization 2 times daily 120 mL 3    nitroGLYCERIN (NITROSTAT) 0.4 MG SL tablet Place 1 tablet under the tongue every 5 minutes as needed for Chest pain 25 tablet 1    Multiple Vitamins-Minerals (MULTI FOR HER 50+ PO) Take 1 tablet by mouth daily       No current facility-administered medications for this visit. PHYSICAL EXAM:  Vitals:    11/02/20 0941   BP: 132/80   Pulse: 59   Resp: 16   Temp: 98.7 °F (37.1 °C)   SpO2: 93%       PE  GENERAL:  Well nourished, alert  HEENT:  No scleral icterus, no conjunctival irritation  NECK:  No thyromegaly, no bruits  LYMPH:  No cervical or supraclavicular adenopathy  HEART:  Regular rate and rhythm, no murmurs.   Distant heart sounds   LUNGS:  Diminished, but clear   ABDOMEN:  No distention, no organomegaly  EXTREMITIES:  +  edema, no digital clubbing  NEURO:  No localizing deficits, CN II-XII intact    Pulmonary Function Testing 7/2015  Severe obstruction with no response to bronchodilators    Moderate Restriction    Moderate Reduction in diffusing capacity     Chest imaging from 2/2020 is reviewed. My impression is no obvious abnormality     ECHO 1/2020  There is moderate concentric left ventricular hypertrophy. Patient appears to be in atrial fibrillation. Ejection fraction is estimated to be 50-60%. Indeterminate diastolic function. Mild aortic regurgitation. Mild tricuspid regurgitation. Mild mitral regurgitation with mitral annular calcification    6 MWT 9/2020  The patient ambulated 122 meters which is abnormal- 34-%   predicted.  Her, heart rate response was normal, the blood   pressure response was normal, oxygen saturations were normal.     The patient desaturated to 93% while ambulating on room air.     The degree of symptoms based on the Danielle Dyspnea/Fatigue scale   were increased with testing.  This test does not indicate the   need for supplemental oxygen. Lab Results   Component Value Date    WBC 8.0 09/25/2020    HGB 13.8 09/25/2020    HCT 42.7 09/25/2020    MCV 88.1 09/25/2020     09/25/2020       No results found for: BNP    Lab Results   Component Value Date    CREATININE 1.2 09/25/2020    BUN 27 (H) 09/25/2020     09/25/2020    K 4.4 09/25/2020    CL 98 (L) 09/25/2020    CO2 25 09/25/2020     I reviewed the above labs and images    COPD Assessment Test    1. I never cough vs I cough all the time:  4  2. I have no phlegm vs I am completely full of phlegm. 4  3. My chest does not feel tight vs my chest feel vs tight. 3  4. Not breathless with inclines vs breathless with inclines. 5  5. Not limited with ADLs vs Very limited with ADLs. 4  6. Confidence leaving home vs no confidence. 4  7. I sleep soundly vs I don't sleep soundly. 4  8. I have lots of energy vs I have no energy. 4    TOTAL POINTS:  32 (28)    Scoring:    A) >30. Very high impact on quality of life. Optimize therapy including rehab  B) >20.   High impact on quality of life. C) 10-20. Medium impact on qualify of life  D) <10. Low impact on life  E)  5.  Upper limit of normal in health non-smoker    Assessment/Plan:  1. COPD, severe ([address]  I think this is stable. Continue with pulmicort, brovana and duonebs. Albuterol HFA refilled  - albuterol sulfate HFA (PROAIR HFA) 108 (90 Base) MCG/ACT inhaler; Inhale 2 puffs into the lungs every 4 hours as needed for Wheezing or Shortness of Breath  Dispense: 1 Inhaler; Refill: 11    2. SOB (shortness of breath)  Considerations at this time would be beta blocker causing blunted HR response with exercise, and deconditioning. Of course her previous issues with afib were affecting her breathing. She had ablation and seems to be in regular rhythm. We will see how her breathing does with time. I asked her to check her HR and oxygen levels with exertion.   I want to make sure her HR response is normal      Flu shot up to date     Pulmonary Rehab:  Politely declined    Lung Cancer Screening CT:  2/2020    Follow up in 3 months

## 2020-11-02 NOTE — PATIENT INSTRUCTIONS
Continue with breathing treatments every day    Use albuterol when needed    Stay active    Follow up in 3 months

## 2020-12-29 RX ORDER — NIFEDIPINE 60 MG/1
60 TABLET, FILM COATED, EXTENDED RELEASE ORAL DAILY
Qty: 30 TABLET | Refills: 1 | Status: SHIPPED | OUTPATIENT
Start: 2020-12-29 | End: 2021-03-02

## 2020-12-29 NOTE — TELEPHONE ENCOUNTER
Medication Refill    Medication needing refilled:NIFEDIPINE    Dosage of the medication:60mg    How are you taking this medication (QD, BID, TID, QID, PRN):    30 or 90 day supply called in:Take 1 tablet by mouth daily    Which Pharmacy are we sending the medication to?:  [facility] [address], [address] [facility] [address] [facility] # [phone]

## 2021-01-04 ENCOUNTER — HOSPITAL ENCOUNTER (OUTPATIENT)
Dept: GENERAL RADIOLOGY | Age: 76
Discharge: HOME | End: 2021-01-04
Payer: MEDICARE

## 2021-01-04 ENCOUNTER — OFFICE VISIT (OUTPATIENT)
Dept: FAMILY MEDICINE CLINIC | Age: 76
End: 2021-01-04
Payer: MEDICARE

## 2021-01-04 ENCOUNTER — HOSPITAL ENCOUNTER (OUTPATIENT)
Age: 76
Discharge: HOME | End: 2021-01-04
Payer: MEDICARE

## 2021-01-04 VITALS
BODY MASS INDEX: 37.9 KG/M2 | DIASTOLIC BLOOD PRESSURE: 82 MMHG | HEART RATE: 80 BPM | HEIGHT: 64 IN | OXYGEN SATURATION: 96 % | SYSTOLIC BLOOD PRESSURE: 138 MMHG | TEMPERATURE: 97 F | WEIGHT: 222 LBS

## 2021-01-04 DIAGNOSIS — R06.09 DOE (DYSPNEA ON EXERTION): ICD-10-CM

## 2021-01-04 DIAGNOSIS — R06.09 DOE (DYSPNEA ON EXERTION): Primary | ICD-10-CM

## 2021-01-04 PROCEDURE — 4040F PNEUMOC VAC/ADMIN/RCVD: CPT | Performed by: FAMILY MEDICINE

## 2021-01-04 PROCEDURE — 99213 OFFICE O/P EST LOW 20 MIN: CPT | Performed by: FAMILY MEDICINE

## 2021-01-04 PROCEDURE — G8482 FLU IMMUNIZE ORDER/ADMIN: HCPCS | Performed by: FAMILY MEDICINE

## 2021-01-04 PROCEDURE — G8399 PT W/DXA RESULTS DOCUMENT: HCPCS | Performed by: FAMILY MEDICINE

## 2021-01-04 PROCEDURE — 1036F TOBACCO NON-USER: CPT | Performed by: FAMILY MEDICINE

## 2021-01-04 PROCEDURE — 71046 X-RAY EXAM CHEST 2 VIEWS: CPT

## 2021-01-04 PROCEDURE — 1090F PRES/ABSN URINE INCON ASSESS: CPT | Performed by: FAMILY MEDICINE

## 2021-01-04 PROCEDURE — 1123F ACP DISCUSS/DSCN MKR DOCD: CPT | Performed by: FAMILY MEDICINE

## 2021-01-04 PROCEDURE — G8417 CALC BMI ABV UP PARAM F/U: HCPCS | Performed by: FAMILY MEDICINE

## 2021-01-04 PROCEDURE — G8427 DOCREV CUR MEDS BY ELIG CLIN: HCPCS | Performed by: FAMILY MEDICINE

## 2021-01-04 PROCEDURE — 3017F COLORECTAL CA SCREEN DOC REV: CPT | Performed by: FAMILY MEDICINE

## 2021-01-04 ASSESSMENT — PATIENT HEALTH QUESTIONNAIRE - PHQ9
2. FEELING DOWN, DEPRESSED OR HOPELESS: 0
SUM OF ALL RESPONSES TO PHQ QUESTIONS 1-9: 0
1. LITTLE INTEREST OR PLEASURE IN DOING THINGS: 0
SUM OF ALL RESPONSES TO PHQ9 QUESTIONS 1 & 2: 0
SUM OF ALL RESPONSES TO PHQ QUESTIONS 1-9: 0

## 2021-01-04 NOTE — PROGRESS NOTES
Sanchez Levine (:  1945) is a 77 y.o. female,Established patient, here for evaluation of the following chief complaint(s):  Breathing Problem (x 2 weeks)      ASSESSMENT/PLAN:    .   Diagnosis Orders   1. LI (dyspnea on exertion)  XR CHEST (2 VW)       Known hx of heart disease and CHF  She also has hx of copd  Her exam was unrewarding today  I will get chest film   Move appt up with the cardiology  If worse to the ER  she has no uri sx    No follow-ups on file.     SUBJECTIVE/OBJECTIVE:  Patient presents with:  Breathing Problem: x 2 weeks    She has the above  Just walking across the room is hard  No sob no fever no cold sx  No v no d  No loss of smell no loss of taste  O2 96 at home  No swelling no urine pb  No orthopnea but has had a few episodes  No sore throat no cough   She notes no congestion in head     No exposure to anyone known    YOB: 1945    Date of Visit:  2021     -- Morphine -- Rash    --  rash   -- Other -- Rash    Current Outpatient Medications:    NIFEdipine (ADALAT CC) 60 MG extended release tablet, Take 1 tablet by mouth daily, Disp: 30 tablet, Rfl: 1    albuterol sulfate HFA (PROAIR HFA) 108 (90 Base) MCG/ACT inhaler, Inhale 2 puffs into the lungs every 4 hours as needed for Wheezing or Shortness of Breath, Disp: 1 Inhaler, Rfl: 11    torsemide (DEMADEX) 20 MG tablet, Take 1 tablet by mouth daily, Disp: 90 tablet, Rfl: 4    metoprolol succinate (TOPROL XL) 50 MG extended release tablet, Take 1 tablet by mouth daily, Disp: 90 tablet, Rfl: 3    rosuvastatin (CRESTOR) 20 MG tablet, Take 1 tablet by mouth daily, Disp: 30 tablet, Rfl: 11    isosorbide mononitrate (IMDUR) 30 MG extended release tablet, TAKE ONE TABLET BY MOUTH DAILY, Disp: 90 tablet, Rfl: 5    rivaroxaban (XARELTO) 20 MG TABS tablet, TAKE ONE TABLET BY MOUTH DAILY WITH BREAKFAST, Disp: 30 tablet, Rfl: 5   albuterol sulfate HFA (PROAIR HFA) 108 (90 Base) MCG/ACT inhaler, Inhale 2 puffs into the lungs every 4 hours as needed for Wheezing or Shortness of Breath, Disp: 1 Inhaler, Rfl: 11    Arformoterol Tartrate (BROVANA) 15 MCG/2ML NEBU, Take 2 mLs by nebulization 2 times daily, Disp: 120 mL, Rfl: 3    hydrALAZINE (APRESOLINE) 50 MG tablet, Take 1 tablet by mouth every 8 hours, Disp: 90 tablet, Rfl: 3    albuterol (PROVENTIL) (2.5 MG/3ML) 0.083% nebulizer solution, Take 3 mLs by nebulization every 4 hours as needed for Wheezing, Disp: 120 mL, Rfl: 5    budesonide (PULMICORT) 0.5 MG/2ML nebulizer suspension, Take 2 mLs by nebulization 2 times daily, Disp: 360 mL, Rfl: 3    formoterol (PERFOROMIST) 20 MCG/2ML nebulizer solution, Take 2 mLs by nebulization 2 times daily, Disp: 120 mL, Rfl: 3    nitroGLYCERIN (NITROSTAT) 0.4 MG SL tablet, Place 1 tablet under the tongue every 5 minutes as needed for Chest pain, Disp: 25 tablet, Rfl: 1    Multiple Vitamins-Minerals (MULTI FOR HER 50+ PO), Take 1 tablet by mouth daily, Disp: , Rfl:     No current facility-administered medications for this visit.       ---------------------------               01/04/21                      1528         ---------------------------   BP:          138/82         Site:    Left Upper Arm     Position:     Sitting        Cuff Size:   Large Adult      Pulse:         80           Temp:    97 °F (36.1 °C)    TempSrc:    Temporal        SpO2:          96%          Weight: 222 lb (100.7 kg)   Height:  5' 4\" (1.626 m)   ---------------------------  Body mass index is 38.11 kg/m².      Wt Readings from Last 3 Encounters:  01/04/21 : 222 lb (100.7 kg)  11/02/20 : 213 lb (96.6 kg)  10/22/20 : 213 lb (96.6 kg)    BP Readings from Last 3 Encounters:  01/04/21 : 138/82  11/02/20 : 132/80  10/22/20 : 134/82            Review of Systems    Physical Exam  Constitutional: General: She is not in acute distress. Appearance: Normal appearance. She is well-developed. She is not ill-appearing or diaphoretic. Cardiovascular:      Rate and Rhythm: Normal rate and regular rhythm. Heart sounds: Normal heart sounds. No murmur. No friction rub. No gallop. Comments: No edema legs   Pulmonary:      Effort: Pulmonary effort is normal. No tachypnea, accessory muscle usage or respiratory distress. Breath sounds: Normal breath sounds. No decreased breath sounds, wheezing, rhonchi or rales. Lymphadenopathy:      Cervical: No cervical adenopathy. Upper Body:      Right upper body: No supraclavicular adenopathy. Left upper body: No supraclavicular adenopathy. Skin:     General: Skin is warm and dry. Coloration: Skin is not pale. Neurological:      Mental Status: She is alert. An electronic signature was used to authenticate this note.     --Arminda Brunner, MD

## 2021-01-06 NOTE — PROGRESS NOTES
metoprolol succinate (TOPROL XL) 50 MG extended release tablet Take 1 tablet by mouth daily Yes Erich Royal MD   rosuvastatin (CRESTOR) 20 MG tablet Take 1 tablet by mouth daily Yes Elaina South MD   isosorbide mononitrate (IMDUR) 30 MG extended release tablet TAKE ONE TABLET BY MOUTH DAILY Yes Maritza Mtz MD   rivaroxaban (XARELTO) 20 MG TABS tablet TAKE ONE TABLET BY MOUTH DAILY WITH BREAKFAST Yes Mckenna Vora DO   albuterol sulfate HFA (PROAIR HFA) 108 (90 Base) MCG/ACT inhaler Inhale 2 puffs into the lungs every 4 hours as needed for Wheezing or Shortness of Breath Yes Mckenna Vora DO   Arformoterol Tartrate (BROVANA) 15 MCG/2ML NEBU Take 2 mLs by nebulization 2 times daily Yes Tanya Beaulieu MD   hydrALAZINE (APRESOLINE) 50 MG tablet Take 1 tablet by mouth every 8 hours Yes Lamonte Beaulieu MD   albuterol (PROVENTIL) (2.5 MG/3ML) 0.083% nebulizer solution Take 3 mLs by nebulization every 4 hours as needed for Wheezing Yes Mckenna Vora, DO   budesonide (PULMICORT) 0.5 MG/2ML nebulizer suspension Take 2 mLs by nebulization 2 times daily Yes Mckenna Vora DO   formoterol (PERFOROMIST) 20 MCG/2ML nebulizer solution Take 2 mLs by nebulization 2 times daily Yes Mckenna Vora DO   nitroGLYCERIN (NITROSTAT) 0.4 MG SL tablet Place 1 tablet under the tongue every 5 minutes as needed for Chest pain Yes Jaelyn Ovalles MD   Multiple Vitamins-Minerals (MULTI FOR HER 50+ PO) Take 1 tablet by mouth daily Yes Historical Provider, MD        Past Medical History:  Past Medical History:   Diagnosis Date    AAA (ab[facility]) ([facility]     pt states it is 4cm    AAA (abdominal aortic aneurysm) without rupture ([facility] 2/10/2015    Atrial fibrillation ([facility]     CAD (coronary artery disease)     CHF (congestive heart failure) ([address]     COPD (chronic obstructive pulmonary disease) ([facility]     History of blood clots     Hyperlipidemia  Hypertension        Past Surgical History:   Past Surgical History:   Procedure Laterality Date    ABDOMINAL AORTIC ANEURYSM REPAIR      Endovascular abdominal AA    APPENDECTOMY  1990    incidental    BLADDER SUSPENSION      CATARACT REMOVAL      CHOLECYSTECTOMY  10/15/13    COLONOSCOPY  9/2/07    dr Teresa Reich and check in 5 years.  COLONOSCOPY  06/16/2017    ok dr arndt, repeat 5 years   [address]    for benign tumor. just the uterus    JOINT REPLACEMENT  12/2013    right knee replacement    TONSILLECTOMY  as a child    TUMOR EXCISION      benign behind right ear about 2008       Social History:   reports that she quit smoking about 7 years ago. Her smoking use included cigarettes. She started smoking about 44 years ago. She has a 37.00 pack-year smoking history. She has never used smokeless tobacco. She reports that she does not drink alcohol or use drugs. Family History:      Problem Relation Age of Onset    Heart Disease Father     Hypertension Other        Review of Systems   Constitutional: Positive for fatigue. Negative for chills, fever and unexpected weight change. HENT: Negative for congestion, hearing loss, sinus pressure, sore throat and trouble swallowing. Respiratory: Positive for shortness of breath (LI, worse over the last week). Negative for cough and wheezing. Cardiovascular: Negative for chest pain, palpitations and leg swelling. Gastrointestinal: Negative for abdominal pain, blood in stool, constipation, diarrhea, nausea and vomiting. Genitourinary: Negative for hematuria. Musculoskeletal: Negative for arthralgias, back pain, gait problem and myalgias. Skin: Negative for color change, rash and wound. Neurological: Negative for dizziness, seizures, syncope, speech difficulty, weakness and light-headedness. Hematological: Does not bruise/bleed easily.         Physical Examination:  Vitals:    01/07/21 1335   BP: 128/70   Pulse: 78 Lipid Panel:   Lab Results   Component Value Date    CHOL 155 2019    HDL 41 2019    HDL 38 2011    TRIG 118 2019     LFTs:  Lab Results   Component Value Date    ALT 29 2020    AST 24 2020    ALKPHOS 82 2020    BILITOT 0.4 2020     Coags:   Lab Results   Component Value Date    PROTIME 26.0 (H) 02/15/2020    INR 2.22 (H) 02/15/2020    APTT 37.2 (H) 02/15/2020       EC2021   Atrial fibrillation/flutter at 72 BPM. Poor R wave progression. Non-specific ST-T wave changes. Echo: Echo: 20   Conclusions      Summary   Concentric LVH with normal LV size and wall motion. EF is    60%.  Grade II diastolic dysfunction with elevated LV filling pressures.   Trivial mitral regurgitation is present.   The left atrium is severely dilated.   Mild aortic regurgitation.   Right ventricular systolic function is normal .   Trivial tricuspid regurgitation.     LHC: 3/5/18  OVERALL IMPRESSION  1.  Again, 100% proximal right coronary artery occlusion with left to right  collaterals. 2.  Mild disease of the distal left main trunk. 3.  20% to 30% ostial left anterior descending artery stenosis with  collaterals from LAD to the right coronary artery. 4.  30% to 40% stenosis of the proximal circumflex artery. 5.  Normal left ventricular systolic function with estimated EF of 55% to  60%. 6.  Slightly enlarged aortic root with no evidence of aortic stenosis or  Regurgitation.     RHC: 20  OVERALL IMPRESSION:  1.  _____ normal right cardiac pressure. 2.  Elevated pulmonary capillary wedge pressure with a mean pulmonary  capillary wedge of 29 mmHg. 3.  Normal cardiac output and indices. 4.  No oxygen step off to suggest ASD/PFO.     Stress: 18  Conclusions          Summary     Abnormal study.  There is a moderate sized, mild intensity, partially     reversible defect of the mid to apical anterior and mid anterolateral walls

## 2021-01-07 ENCOUNTER — OFFICE VISIT (OUTPATIENT)
Dept: CARDIOLOGY CLINIC | Age: 76
End: 2021-01-07
Payer: MEDICARE

## 2021-01-07 VITALS
DIASTOLIC BLOOD PRESSURE: 70 MMHG | SYSTOLIC BLOOD PRESSURE: 128 MMHG | TEMPERATURE: 97.3 F | BODY MASS INDEX: 37.73 KG/M2 | HEIGHT: 64 IN | OXYGEN SATURATION: 93 % | HEART RATE: 78 BPM | WEIGHT: 221 LBS

## 2021-01-07 DIAGNOSIS — I48.92 LEFT ATRIAL FLUTTER BY ELECTROCARDIOGRAM (HCC): ICD-10-CM

## 2021-01-07 DIAGNOSIS — I71.40 AAA (ABDOMINAL AORTIC ANEURYSM) WITHOUT RUPTURE: ICD-10-CM

## 2021-01-07 DIAGNOSIS — I48.19 PERSISTENT ATRIAL FIBRILLATION (HCC): ICD-10-CM

## 2021-01-07 DIAGNOSIS — I50.32 CHRONIC DIASTOLIC HEART FAILURE (HCC): ICD-10-CM

## 2021-01-07 DIAGNOSIS — I10 ESSENTIAL HYPERTENSION: ICD-10-CM

## 2021-01-07 DIAGNOSIS — I48.19 PERSISTENT ATRIAL FIBRILLATION (HCC): Primary | ICD-10-CM

## 2021-01-07 DIAGNOSIS — I25.10 CORONARY ARTERY DISEASE INVOLVING NATIVE CORONARY ARTERY OF NATIVE HEART WITHOUT ANGINA PECTORIS: ICD-10-CM

## 2021-01-07 PROCEDURE — 1123F ACP DISCUSS/DSCN MKR DOCD: CPT | Performed by: NURSE PRACTITIONER

## 2021-01-07 PROCEDURE — 3017F COLORECTAL CA SCREEN DOC REV: CPT | Performed by: NURSE PRACTITIONER

## 2021-01-07 PROCEDURE — G8482 FLU IMMUNIZE ORDER/ADMIN: HCPCS | Performed by: NURSE PRACTITIONER

## 2021-01-07 PROCEDURE — 1036F TOBACCO NON-USER: CPT | Performed by: NURSE PRACTITIONER

## 2021-01-07 PROCEDURE — 93000 ELECTROCARDIOGRAM COMPLETE: CPT | Performed by: NURSE PRACTITIONER

## 2021-01-07 PROCEDURE — 1090F PRES/ABSN URINE INCON ASSESS: CPT | Performed by: NURSE PRACTITIONER

## 2021-01-07 PROCEDURE — 4040F PNEUMOC VAC/ADMIN/RCVD: CPT | Performed by: NURSE PRACTITIONER

## 2021-01-07 PROCEDURE — G8427 DOCREV CUR MEDS BY ELIG CLIN: HCPCS | Performed by: NURSE PRACTITIONER

## 2021-01-07 PROCEDURE — G8399 PT W/DXA RESULTS DOCUMENT: HCPCS | Performed by: NURSE PRACTITIONER

## 2021-01-07 PROCEDURE — 99214 OFFICE O/P EST MOD 30 MIN: CPT | Performed by: NURSE PRACTITIONER

## 2021-01-07 PROCEDURE — G8417 CALC BMI ABV UP PARAM F/U: HCPCS | Performed by: NURSE PRACTITIONER

## 2021-01-07 RX ORDER — FLECAINIDE ACETATE 50 MG/1
50 TABLET ORAL 2 TIMES DAILY
Status: ON HOLD | COMMUNITY
End: 2021-02-18 | Stop reason: HOSPADM

## 2021-01-07 ASSESSMENT — ENCOUNTER SYMPTOMS
ABDOMINAL PAIN: 0
SINUS PRESSURE: 0
VOMITING: 0
CONSTIPATION: 0
COUGH: 0
BLOOD IN STOOL: 0
SORE THROAT: 0
SHORTNESS OF BREATH: 1
BACK PAIN: 0
DIARRHEA: 0
COLOR CHANGE: 0
TROUBLE SWALLOWING: 0
WHEEZING: 0
NAUSEA: 0

## 2021-01-07 NOTE — PATIENT INSTRUCTIONS
Cardioversion Pre Procedure Instructions    Date: Tuesday 1/12/2021  Arrive at: 12:30 pm  Procedure time: 1:30 pm    The morning of your procedure you will park in the hospital parking lot and report directly to the cath lab to check in.     Pre-Procedure Instructions   1. Do not eat or drink anything for 8 hours prior to the procedure. 2. Take your Eliquis, Xarelto, Coumadin to morning of the procedure with sips of water. 3. If you are a diabetic you will need to hold all diabetic medications including, Metformin the morning of the procedure. If you take Lantus/Levemir only take ½ your normal dose the evening before. All other medications can be taken in the morning with sips of water. 4. Do not use any lotions, creams or perfume the morning of procedure. 5. Pre-procedure lab work will need to be completed 5-7 days prior to procedure. 6. Please have a responsible adult to drive you home after procedure. It is recommended you do not drive for 24 hours after procedure and that someone stay with you for precautionary measures. 7. Cath lab will provide you with all post procedure instructions. If you have any questions regarding the procedure itself or medications please call [phone] and ask to speak with a nurse        Patient Education        Atrial Fibrillation: Care Instructions  Your Care Instructions     Atrial fibrillation is an irregular and often fast heartbeat. Treating this condition is important for several reasons. It can cause blood clots, which can travel from your heart to your brain and cause a stroke. If you have a fast heartbeat, you may feel lightheaded, dizzy, and weak. An irregular heartbeat can also increase your risk for heart failure. Atrial fibrillation is often the result of another heart condition, such as high blood pressure or coronary artery disease. Making changes to improve your heart condition will help you stay healthy and active. Follow-up care is a key part of your treatment and safety. Be sure to make and go to all appointments, and call your doctor if you are having problems. It's also a good idea to know your test results and keep a list of the medicines you take. How can you care for yourself at home? Medicines    · Take your medicines exactly as prescribed. Call your doctor if you think you are having a problem with your medicine. You will get more details on the specific medicines your doctor prescribes.     · If your doctor has given you a blood thinner to prevent a stroke, be sure you get instructions about how to take your medicine safely. Blood thinners can cause serious bleeding problems.     · Do not take any vitamins, over-the-counter drugs, or herbal products without talking to your doctor first.   Lifestyle changes    · Do not smoke. Smoking can increase your chance of a stroke and heart attack. If you need help quitting, talk to your doctor about stop-smoking programs and medicines. These can increase your chances of quitting for good.     · Eat a heart-healthy diet.     · Stay at a healthy weight. Lose weight if you need to.     · Limit alcohol to 2 drinks a day for men and 1 drink a day for women. Too much alcohol can cause health problems.     · Avoid colds and flu. Get a pneumococcal vaccine shot. If you have had one before, ask your doctor whether you need another dose. Get a flu shot every year. If you must be around people with colds or flu, wash your hands often. Activity    · If your doctor recommends it, get more exercise. Walking is a good choice. Bit by bit, increase the amount you walk every day. Try for at least 30 minutes on most days of the week. You also may want to swim, bike, or do other activities. Your doctor may suggest that you join a cardiac rehabilitation program so that you can have help increasing your physical activity safely.   · Start light exercise if your doctor says it is okay. Even a small amount will help you get stronger, have more energy, and manage stress. Walking is an easy way to get exercise. Start out by walking a little more than you did in the hospital. Gradually increase the amount you walk.     · When you exercise, watch for signs that your heart is working too hard. You are pushing too hard if you cannot talk while you are exercising. If you become short of breath or dizzy or have chest pain, sit down and rest immediately.     · Check your pulse regularly. Place two fingers on the artery at the palm side of your wrist, in line with your thumb. If your heartbeat seems uneven or fast, talk to your doctor. When should you call for help? Call 911 anytime you think you may need emergency care. For example, call if:    · You have symptoms of a heart attack. These may include:  ? Chest pain or pressure, or a strange feeling in the chest.  ? Sweating. ? Shortness of breath. ? Nausea or vomiting. ? Pain, pressure, or a strange feeling in the back, neck, jaw, or upper belly or in one or both shoulders or arms. ? Lightheadedness or sudden weakness. ? A fast or irregular heartbeat. After you call 911, the  may tell you to chew 1 adult-strength or 2 to 4 low-dose aspirin. Wait for an ambulance. Do not try to drive yourself.     · You have symptoms of a stroke. These may include:  ? Sudden numbness, tingling, weakness, or loss of movement in your face, arm, or leg, especially on only one side of your body. ? Sudden vision changes. ? Sudden trouble speaking. ? Sudden confusion or trouble understanding simple statements. ? Sudden problems with walking or balance. ? A sudden, severe headache that is different from past headaches.     · You passed out (lost consciousness). Call your doctor now or seek immediate medical care if:    · You have new or increased shortness of breath.   · You feel dizzy or lightheaded, or you feel like you may faint.     · Your heart rate becomes irregular.     · You can feel your heart flutter in your chest or skip heartbeats. Tell your doctor if these symptoms are new or worse. Watch closely for changes in your health, and be sure to contact your doctor if you have any problems. Where can you learn more? Go to https://MoPub.VoltDB. org and sign in to your [employer] account. Enter U020 in the [employer] box to learn more about \"Atrial Fibrillation: Care Instructions. \"     If you do not have an account, please click on the \"Sign Up Now\" link. Current as of: December 16, 2019               Content Version: 12.6  © 4348-9799 [employer], [employer]. Care instructions adapted under license by [facility] ([facility]). If you have questions about a medical condition or this instruction, always ask your healthcare professional. [place] [address] any warranty or liability for your use of this information. Patient Education        Learning About Cardioversion  What is cardioversion? Cardioversion is a treatment that helps your heart return to a normal rhythm. It treats problems like atrial fibrillation. It is also sometimes used in emergencies. It can correct a fast heartbeat that causes low blood pressure, chest pain, or heart failure. Cardioversion can be done by using an electric current or medicines. What are the types of cardioversion? There are two types:  · The electrical type uses an electric current. The current enters your body through patches on your chest or back. · The chemical type uses medicines. The medicine is usually put into your arm through a tube called an IV. Your doctor may ask you to take medicines before the treatment. These help prevent blood clots. Electrical cardioversion  The electrical procedure is done in a hospital. You will get medicine to help you relax and control the pain. Your doctor will put patches on your chest or back. The patches send an electric current to your heart. This resets your heart rhythm. The electrical part takes about 5 minutes. But you will probably be in the hospital for 1 to 2 hours. You will need to recover from the effects of the sedative medicine. Chemical cardioversion  The chemical procedure is most often done in a hospital. In most cases, the medicine is put into your arm through a tube called an IV. But you may get medicines to take by mouth. You may feel a quick sting or pinch when the IV starts. The procedure usually takes about 4 to 8 hours. What can you expect after cardioversion? · You can usually go home the same day. You will need someone to drive you home. · Your doctor may have you take medicines daily. These help your heart beat normally and prevent blood clots. · After electrical cardioversion, you may have redness where the patches were. This looks and feels like a sunburn. · Abnormal heart rhythms sometimes come back after cardioversion. Follow-up care is a key part of your treatment and safety. Be sure to make and go to all appointments, and call your doctor if you are having problems. It's also a good idea to know your test results and keep a list of the medicines you take. Where can you learn more? Go to https://Coupz.Parchment. org and sign in to your [employer] account. Enter I501 in the [employer] box to learn more about \"Learning About Cardioversion. \"     If you do not have an account, please click on the \"Sign Up Now\" link. Current as of: December 16, 2019               Content Version: 12.6  © 4977-1794 [employer]orporated. Care instructions adapted under license by [facility] ([facility]). If you have questions about a medical condition or this instruction, always ask your healthcare professional. [address] any warranty or liability for your use of this information. Patient Education        Electrical Cardioversion: Before Your Procedure  What is electrical cardioversion? Electrical cardioversion is a treatment for a heartbeat that isn't normal, such as atrial fibrillation. It uses a brief electric shock to reset your heart's rhythm. Before the treatment, you will get medicine to make you sleepy. You should not feel any pain. Your doctor will put patches on your chest. Or you might get them on both your chest and back. They send a brief electric current to your heart. In most cases, this restores the heart's normal rhythm right away. Cardioversion itself takes about 5 minutes. But the whole procedure will likely take about 30 to 45 minutes. That includes time to recover. Abnormal heart rhythms sometimes come back after the treatment. You may need to take medicines. These may help your heart keep its normal rhythm. Follow-up care is a key part of your treatment and safety. Be sure to make and go to all appointments, and call your doctor if you are having problems. It's also a good idea to know your test results and keep a list of the medicines you take. How do you prepare for the procedure? Procedures can be stressful. This information will help you understand what you can expect. And it will help you safely prepare for your procedure. Preparing for the procedure    · Be sure you have someone to take you home. Anesthesia and pain medicine will make it unsafe for you to drive or get home on your own.     · Understand exactly what procedure is planned, along with the risks, benefits, and other options.     · If you take aspirin or some other blood thinner, ask your doctor if you should stop taking it before your procedure. Make sure that you understand exactly what your doctor wants you to do. These medicines increase the risk of bleeding.   · Tell your doctor ALL the medicines, vitamins, supplements, and herbal remedies you take. Some may increase the risk of problems during your procedure. Your doctor will tell you if you should stop taking any of them before the procedure and how soon to do it.     · Make sure your doctor and the hospital have a copy of your advance directive. If you don't have one, you may want to prepare one. It lets others know your health care wishes. It's a good thing to have before any type of surgery or procedure. What happens on the day of the procedure? · Follow the instructions exactly about when to stop eating and drinking. If you don't, your procedure may be canceled. If your doctor told you to take your medicines on the day of the procedure, take them with only a sip of water.     · Take a bath or shower before you come in for your procedure. Do not apply lotions, perfumes, deodorants, or nail polish.     · Take off all jewelry and piercings. And take out contact lenses, if you wear them. At the hospital or surgery center   · Bring a picture ID.     · You will get medicine to make you sleepy.     · The procedure will take about 30 to 45 minutes. When should you call your doctor? · You have questions or concerns.     · You don't understand how to prepare for your procedure.     · You become ill before the procedure (such as fever, flu, or a cold).     · You need to reschedule or have changed your mind about having the procedure. Where can you learn more? Go to https://VTMlourdes.LoveThis. org and sign in to your [employer] account. Enter S549 in the Firefly Media box to learn more about \"Electrical Cardioversion: Before Your Procedure. \"     If you do not have an account, please click on the \"Sign Up Now\" link. Current as of: December 16, 2019               Content Version: 12.6  © 3109-4219 Yarraa, Incorporated.   · Start light exercise if your doctor says that it's okay. Even a small amount will help you get stronger, have more energy, and manage your stress. Walking is an easy way to get exercise. Start out by walking a little more than you did in the hospital. Bit by bit, increase the amount you walk.     · When you exercise, watch for signs that your heart is working too hard. You are pushing too hard if you cannot talk while you are exercising. If you become short of breath or dizzy or have chest pain, sit down and rest right away.     · Check your pulse regularly. Place two fingers on the artery at the palm side of your wrist in line with your thumb. If your heartbeat seems uneven or fast, talk to your doctor. Other instructions    · Ask your doctor when you can drive again.     · Do not smoke. If you need help quitting, talk to your doctor about stop-smoking programs and medicines. These can increase your chances of quitting for good.     · Limit alcohol. Follow-up care is a key part of your treatment and safety. Be sure to make and go to all appointments, and call your doctor if you are having problems. It's also a good idea to know your test results and keep a list of the medicines you take. When should you call for help? Call 911 anytime you think you may need emergency care. For example, call if:    · You passed out (lost consciousness).     · You have chest pain or pressure. This may occur with:  ? Sweating. ? Shortness of breath. ? Nausea or vomiting. ? Pain that spreads from the chest to the neck, jaw, or one or both shoulders or arms. ? A fast or uneven pulse. After calling 911, the  may tell you to chew 1 adult-strength or 2 to 4 low-dose aspirin. Wait for an ambulance. Do not try to drive yourself.     · You have symptoms of a stroke. These may include:  ? Sudden numbness, tingling, weakness, or loss of movement in your face, arm, or leg, especially on only one side of your body. ? Sudden vision changes. ? Sudden trouble speaking. ? Sudden confusion or trouble understanding simple statements. ? Sudden problems with walking or balance. ? A sudden, severe headache that is different from past headaches. Call your doctor now or seek immediate medical care if:    · You feel dizzy or lightheaded, or you feel like you may faint.     · You have a fast or irregular heartbeat. Watch closely for any changes in your health, and be sure to contact your doctor if you have any problems. Where can you learn more? Go to https://User Replaypenadeeneb.Senstore. org and sign in to your LookTracker account. Enter A617 in the [employer] box to learn more about \"Electrical Cardioversion: What to Expect at Home. \"     If you do not have an account, please click on the \"Sign Up Now\" link. Current as of: December 16, 2019               Content Version: 12.6  © 0032-0575 WinningAdvantage, Incorporated. Care instructions adapted under license by [facility] ([facility]). If you have questions about a medical condition or this instruction, always ask your healthcare professional. [address] any warranty or liability for your use of this information.

## 2021-01-08 LAB
ANION GAP SERPL CALCULATED.3IONS-SCNC: 13 MMOL/L (ref 3–16)
BUN BLDV-MCNC: 27 MG/DL (ref 7–20)
CALCIUM SERPL-MCNC: 9.5 MG/DL (ref 8.3–10.6)
CHLORIDE BLD-SCNC: 98 MMOL/L (ref 99–110)
CO2: 28 MMOL/L (ref 21–32)
CREAT SERPL-MCNC: 1.5 MG/DL (ref 0.6–1.2)
GFR AFRICAN AMERICAN: 41
GFR NON-AFRICAN AMERICAN: 34
GLUCOSE BLD-MCNC: 85 MG/DL (ref 70–99)
HCT VFR BLD CALC: 37.6 % (ref 36–48)
HEMOGLOBIN: 11.7 G/DL (ref 12–16)
MCH RBC QN AUTO: 29.3 PG (ref 26–34)
MCHC RBC AUTO-ENTMCNC: 31.1 G/DL (ref 31–36)
MCV RBC AUTO: 94.1 FL (ref 80–100)
PDW BLD-RTO: 17.2 % (ref 12.4–15.4)
PLATELET # BLD: 265 K/UL (ref 135–450)
PMV BLD AUTO: 9.6 FL (ref 5–10.5)
POTASSIUM SERPL-SCNC: 4.7 MMOL/L (ref 3.5–5.1)
RBC # BLD: 4 M/UL (ref 4–5.2)
SODIUM BLD-SCNC: 139 MMOL/L (ref 136–145)
WBC # BLD: 12.9 K/UL (ref 4–11)

## 2021-01-11 RX ORDER — METOPROLOL SUCCINATE 50 MG/1
TABLET, EXTENDED RELEASE ORAL
Qty: 90 TABLET | Refills: 2 | Status: ON HOLD
Start: 2021-01-11 | End: 2021-02-18 | Stop reason: HOSPADM

## 2021-01-12 ENCOUNTER — HOSPITAL ENCOUNTER (OUTPATIENT)
Dept: CARDIAC CATH/INVASIVE PROCEDURES | Age: 76
Discharge: HOME | End: 2021-01-12
Payer: MEDICARE

## 2021-01-12 VITALS
DIASTOLIC BLOOD PRESSURE: 57 MMHG | BODY MASS INDEX: 36.19 KG/M2 | HEIGHT: 64 IN | WEIGHT: 212 LBS | OXYGEN SATURATION: 96 % | SYSTOLIC BLOOD PRESSURE: 116 MMHG | TEMPERATURE: 97.9 F | HEART RATE: 57 BPM | RESPIRATION RATE: 14 BRPM

## 2021-01-12 LAB
EKG ATRIAL RATE: 53 BPM
EKG DIAGNOSIS: NORMAL
EKG P AXIS: 82 DEGREES
EKG P-R INTERVAL: 230 MS
EKG Q-T INTERVAL: 454 MS
EKG QRS DURATION: 76 MS
EKG QTC CALCULATION (BAZETT): 426 MS
EKG R AXIS: -24 DEGREES
EKG T AXIS: 76 DEGREES
EKG VENTRICULAR RATE: 53 BPM

## 2021-01-12 PROCEDURE — 99152 MOD SED SAME PHYS/QHP 5/>YRS: CPT | Performed by: INTERNAL MEDICINE

## 2021-01-12 PROCEDURE — 93010 ELECTROCARDIOGRAM REPORT: CPT | Performed by: INTERNAL MEDICINE

## 2021-01-12 PROCEDURE — 92960 CARDIOVERSION ELECTRIC EXT: CPT | Performed by: FAMILY MEDICINE

## 2021-01-12 PROCEDURE — 92960 CARDIOVERSION ELECTRIC EXT: CPT | Performed by: INTERNAL MEDICINE

## 2021-01-12 PROCEDURE — 2500000003 HC RX 250 WO HCPCS

## 2021-01-12 PROCEDURE — 93005 ELECTROCARDIOGRAM TRACING: CPT | Performed by: INTERNAL MEDICINE

## 2021-01-12 PROCEDURE — 2580000003 HC RX 258

## 2021-01-12 RX ORDER — SODIUM CHLORIDE 9 MG/ML
INJECTION, SOLUTION INTRAVENOUS CONTINUOUS
Status: DISCONTINUED | OUTPATIENT
Start: 2021-01-12 | End: 2021-01-13 | Stop reason: HOSPADM

## 2021-01-12 RX ORDER — SODIUM CHLORIDE 0.9 % (FLUSH) 0.9 %
10 SYRINGE (ML) INJECTION PRN
Status: DISCONTINUED | OUTPATIENT
Start: 2021-01-12 | End: 2021-01-13 | Stop reason: HOSPADM

## 2021-01-12 RX ORDER — SODIUM CHLORIDE 0.9 % (FLUSH) 0.9 %
10 SYRINGE (ML) INJECTION EVERY 12 HOURS SCHEDULED
Status: DISCONTINUED | OUTPATIENT
Start: 2021-01-12 | End: 2021-01-13 | Stop reason: HOSPADM

## 2021-01-12 NOTE — PROCEDURES
[facility]     Electrophysiology Procedure Note       Date of Procedure: 1/12/2021  Patient's Name: Mónica Shine  YOB: 1945   Medical Record Number: [id number]  Referring Physician: No att. providers found  Procedure Performed by: Jarred Guerrero MD    Procedures performed:  · Anesthesia: Monitored Anesthesia Care  · Level of sedation plan: Moderate sedation (conscious sedation) with intravenous Methohexital 40 mg  · Sedation start time: 1452  · Sedation stop time: 1455  · Mallampati airway assessment class:  I  · ASA class: 1  · (there were no independent trained observers who had no other duties involved in this procedure)  · External Electrical cardioversion     Indication of the procedure: Symptomatic, persistent atrial fibrillation      Details of procedure: The patient was brought to the cath lab area in a fasting and non-sedated state. The risks, benefits and alternatives of the procedure were discussed with the patient. The risks including, but not limited to, the risks of vascular injury, bleeding, infection, any pre-existing cardiac implantable electronic device malfunction, any pre-existing cardiac implantable electronic device's lead dislodgement, injury to cardiac and surrounding structures (including pneumothorax), stroke, myocardial infarction and death were discussed in detail. The patient was also counseled at length about the risks of abhay Covid-19 in the niesha-operative and post-operative states including the recovery window of their procedure. The patient was made aware that abhay Covid-19 after a surgical procedure may worsen their prognosis for recovering from the virus and lend to a higher morbidity and or mortality risk. The patient was given the option of postponing their procedure. The patient opted to proceed with the procedure. Written informed consent was signed and placed in the chart. A timeout protocol was completed to identify the patient and the procedure being performed. An independent trained observer assumed the sole responsibility of administering IV sedation medication - Versed, Fentanyl - at my direction and closely monitored the patient. Patient is on chronic anticoagulation therapy with Xarelto. The patient was monitored continuously with ECG, pulse oximetry, blood pressure monitoring, and direct observation. We then administered intravenous Methohexital for sedation, and electrical DC cardioversion was performed using 200J, synchronized shock. Patient was converted immediately to sinus rhythm. Specimen collected: none       The patient tolerated the procedure well and there were no complications. Conclusion:   Successful external DC cardioversion of symptomatic, persistent atrial fibrillation.     Plan: The patient can be discharged if remains stable. Will continue with Flecainide 50mg BID, Toprol XL 50mg daily, and Xarelto 20mg daily. The patient will follow-up with Dr. Isabella Caruso as an outpatient to discuss a re-ablation for atrial fibrillation (she previously had an ablation roughly 3 months ago). Thank you for allowing me to participate in the care of this patient. If you have any questions, please feel free to contact me.     Elli Zhang MD, MS, [facility]  Cardiac Electrophysiology  [address]  [address] [facility], [address] [place] [address]  [place] [address]  [phone]

## 2021-01-12 NOTE — H&P
H&P Update    I have reviewed the history and physical from PARUL Mosley NP (see below) and examined the patient and find no relevant changes. I have reviewed with the patient and/or family the risks, benefits, and alternatives to the procedure. Pre-sedation Assessment    Patient:  Dinh Houston   :   1945  Intended Procedure: CV      [facility] nurses notes reviewed and agreed. Medications reviewed  Allergies: Allergies   Allergen Reactions    Morphine Rash     rash    Other Rash         Pre-Procedure Assessment/Plan:  ASA 2 - Patient with mild systemic disease with no functional limitations    Level of Sedation Plan: Moderate sedation    Post Procedure plan: Return to same level of care    -------------------------------              [address]   Electrophysiology        Date: 2021     Referring Provider: No ref. provider found   PCP: Kojo Wilder MD      Chief Complaint:        Chief Complaint   Patient presents with    Follow-up       afib - SOB with exertion      History of Present Illness:     I saw Dinh Houston in the office for electrophysiology follow up today. She is a 77 y.o. female with a past medical history of persistent atrial fibrillation, AAA, CAD, HTN, COPD, PE, DVT and diastolic HF. She underwent EP study with radiofrequency ablation of atrial fibrillation and pulmonary vein isolation, additional ablation with creation of a roof line (for L flutter) and anterior line from mitral annulus to the roof (for L flutter) on 10/7/20 with Dr. Emily Nuñez.  She was started on flecainide 50mg BID, Toprol 50mg QD and Xarelto 20mg QD were continued.     She was feeling well until about a week ago when she started to have a noticeable amount of dyspnea on exertion, no SOB at rest. She has also been having a difficult time sleeping and believe the two are related. Denies any chest pain or palpitations. No syncope. No bleeding issues. No missed doses of Xarelto and she has been taking it with breakfast in the morning.      Allergies: Allergies   Allergen Reactions    Morphine Rash       rash    Other Rash      Home Medications:        Prior to Visit Medications    Medication Sig Taking?  Authorizing Provider   flecainide (TAMBOCOR) 50 MG tablet Take 50 mg by mouth 2 times daily Yes Historical Provider, MD   NIFEdipine (ADALAT CC) 60 MG extended release tablet Take 1 tablet by mouth daily Yes Caterina Schroeder MD   albuterol sulfate HFA (PROAIR HFA) 108 (90 Base) MCG/ACT inhaler Inhale 2 puffs into the lungs every 4 hours as needed for Wheezing or Shortness of Breath Yes Madelin Kassidy, DO   torsemide (DEMADEX) 20 MG tablet Take 1 tablet by mouth daily Yes Lopez Han MD   metoprolol succinate (TOPROL XL) 50 MG extended release tablet Take 1 tablet by mouth daily Yes Suzette Tim MD   rosuvastatin (CRESTOR) 20 MG tablet Take 1 tablet by mouth daily Yes Caterina Schroeder MD   isosorbide mononitrate (IMDUR) 30 MG extended release tablet TAKE ONE TABLET BY MOUTH DAILY Yes Lopez Han MD   rivaroxaban (XARELTO) 20 MG TABS tablet TAKE ONE TABLET BY MOUTH DAILY WITH BREAKFAST Yes Madelin Kassidy, DO   albuterol sulfate HFA (PROAIR HFA) 108 (90 Base) MCG/ACT inhaler Inhale 2 puffs into the lungs every 4 hours as needed for Wheezing or Shortness of Breath Yes Madelin Yi, DO   Arformoterol Tartrate (BROVANA) 15 MCG/2ML NEBU Take 2 mLs by nebulization 2 times daily Yes Marli Vanegas MD   hydrALAZINE (APRESOLINE) 50 MG tablet Take 1 tablet by mouth every 8 hours Yes Marli Vanegas MD albuterol (PROVENTIL) (2.5 MG/3ML) 0.083% nebulizer solution Take 3 mLs by nebulization every 4 hours as needed for Wheezing Yes Leotis Cools, DO   budesonide (PULMICORT) 0.5 MG/2ML nebulizer suspension Take 2 mLs by nebulization 2 times daily Yes Leotis Cools, DO   formoterol (PERFOROMIST) 20 MCG/2ML nebulizer solution Take 2 mLs by nebulization 2 times daily Yes Leotis Cools, DO   nitroGLYCERIN (NITROSTAT) 0.4 MG SL tablet Place 1 tablet under the tongue every 5 minutes as needed for Chest pain Yes Noel Mansfield MD   Multiple Vitamins-Minerals (MULTI FOR HER 50+ PO) Take 1 tablet by mouth daily Yes Historical Provider, MD         Past Medical History:  Past Medical History        Past Medical History:   Diagnosis Date    AAA (abdominal aortic aneurysm) ([address]       pt states it is 4cm    AAA (abdominal aortic aneurysm) without rupture ([address] 2/10/2015    Atrial fibrillation ([address]      CAD (coronary artery disease)      CHF (congestive heart failure) ([address]      COPD (chronic obstructive pulmonary disease) ([address]      History of blood clots      Hyperlipidemia      Hypertension              Past Surgical History:   Past Surgical History         Past Surgical History:   Procedure Laterality Date    ABDOMINAL AORTIC ANEURYSM REPAIR         Endovascular abdominal AA    APPENDECTOMY   1990     incidental    BLADDER SUSPENSION        CATARACT REMOVAL        CHOLECYSTECTOMY   10/15/13    COLONOSCOPY   9/2/07     dr Minor Solid and check in 5 years.  COLONOSCOPY   06/16/2017     ok dr arndt, repeat 5 years   [address]     for benign tumor.  just the uterus    JOINT REPLACEMENT   12/2013     right knee replacement    TONSILLECTOMY   as a child    TUMOR EXCISION         benign behind right ear about 2008            Social History: reports that she quit smoking about 7 years ago. Her smoking use included cigarettes. She started smoking about 44 years ago. She has a 37.00 pack-year smoking history. She has never used smokeless tobacco. She reports that she does not drink alcohol or use drugs.      Family History:  Family History             Problem Relation Age of Onset    Heart Disease Father      Hypertension Other              Review of Systems   Constitutional: Positive for fatigue. Negative for chills, fever and unexpected weight change. HENT: Negative for congestion, hearing loss, sinus pressure, sore throat and trouble swallowing. Respiratory: Positive for shortness of breath (LI, worse over the last week). Negative for cough and wheezing. Cardiovascular: Negative for chest pain, palpitations and leg swelling. Gastrointestinal: Negative for abdominal pain, blood in stool, constipation, diarrhea, nausea and vomiting. Genitourinary: Negative for hematuria. Musculoskeletal: Negative for arthralgias, back pain, gait problem and myalgias. Skin: Negative for color change, rash and wound. Neurological: Negative for dizziness, seizures, syncope, speech difficulty, weakness and light-headedness. Hematological: Does not bruise/bleed easily.         Physical Examination:      Vitals:     01/07/21 1335   BP: 128/70   Pulse: 78   Temp: 97.3 °F (36.3 °C)   SpO2: 93%          Wt Readings from Last 3 Encounters:   01/07/21 221 lb (100.2 kg)   01/04/21 222 lb (100.7 kg)   11/02/20 213 lb (96.6 kg)         Physical Exam  Vitals signs reviewed. Constitutional:       General: She is not in acute distress. Appearance: Normal appearance. HENT:      Head: Normocephalic and atraumatic. Nose: Nose normal.      Mouth/Throat:      Mouth: Mucous membranes are moist.   Eyes:      Conjunctiva/sclera: Conjunctivae normal.      Pupils: Pupils are equal, round, and reactive to light.    Cardiovascular: Rate and Rhythm: Normal rate. Rhythm irregularly irregular. Heart sounds: No murmur. No friction rub. No gallop. Pulmonary:      Effort: No respiratory distress. Breath sounds: No wheezing, rhonchi or rales. Abdominal:      General: Abdomen is flat. Bowel sounds are normal.      Palpations: Abdomen is soft. Musculoskeletal: Normal range of motion. Right lower leg: No edema. Left lower leg: No edema. Skin:     General: Skin is warm and dry. Findings: No bruising. Neurological:      General: No focal deficit present. Mental Status: She is alert and oriented to person, place, and time. Motor: No weakness. Psychiatric:         Mood and Affect: Mood normal.         Behavior: Behavior normal.            Pertinent labs, diagnostic, device, and imaging results reviewed as a part of this visit     LABS     CBC:   Lab Results   Component Value Date     WBC 8.0 2020     HGB 13.8 2020     HCT 42.7 2020     MCV 88.1 2020      2020      BMP:         Lab Results   Component Value Date     CREATININE 1.2 2020     BUN 27 (H) 2020      2020     K 4.4 2020     CL 98 (L) 2020     CO2 25 2020      Estimated Creatinine Clearance: 47 mL/min (based on SCr of 1.2 mg/dL).    No results found for: BNP     Thyroid:         Lab Results   Component Value Date     TSH 0.66 2019      Lipid Panel:         Lab Results   Component Value Date     CHOL 155 2019     HDL 41 2019     HDL 38 2011     TRIG 118 2019      LFTs:        Lab Results   Component Value Date     ALT 29 2020     AST 24 2020     ALKPHOS 82 2020     BILITOT 0.4 2020      Coags:   Lab Results   Component Value Date     PROTIME 26.0 (H) 02/15/2020     INR 2.22 (H) 02/15/2020     APTT 37.2 (H) 02/15/2020         EC2021 Atrial fibrillation/flutter at 72 BPM. Poor R wave progression. Non-specific ST-T wave changes.      Echo: Echo: 9/25/20   Conclusions      Summary   Concentric LVH with normal LV size and wall motion. EF is    60%.  Grade II diastolic dysfunction with elevated LV filling pressures.   Trivial mitral regurgitation is present.   The left atrium is severely dilated.   Mild aortic regurgitation.   Right ventricular systolic function is normal .   Trivial tricuspid regurgitation.     LHC: 3/5/18  OVERALL IMPRESSION  1.  Again, 100% proximal right coronary artery occlusion with left to right  collaterals. 2.  Mild disease of the distal left main trunk. 3.  20% to 30% ostial left anterior descending artery stenosis with  collaterals from LAD to the right coronary artery. 4.  30% to 40% stenosis of the proximal circumflex artery. 5.  Normal left ventricular systolic function with estimated EF of 55% to  60%. 6.  Slightly enlarged aortic root with no evidence of aortic stenosis or  Regurgitation.     RHC: 2/28/20  OVERALL IMPRESSION:  1.  _____ normal right cardiac pressure. 2.  Elevated pulmonary capillary wedge pressure with a mean pulmonary  capillary wedge of 29 mmHg. 3.  Normal cardiac output and indices. 4.  No oxygen step off to suggest ASD/PFO.     Stress: 2/19/18  Conclusions          Summary     Abnormal study. There is a moderate sized, mild intensity, partially     reversible defect of the mid to apical anterior and mid anterolateral walls     which is consistent with ischemia. There is breast attenuation but stress     images appear worse.     Normal LV size and systolic function.     Overall findings represent an intermediate risk study.            Procedures:  1.  PVI, roof line (for L flutter), anterior line ablation (for L flutter) 10/7/20, Dr. Isabella Caruso     Assessment and Plan:      Persistent atrial fibrillation              - s/p PVI ablation on 10/7/20 with Dr. Isabella Carsuo             - R[address] 5 (age, gender, HTN, HF, CAD) on Xarelto 20mg QD             - EKG today shows rate controlled A fib/flutter, symptoms started 1 week ago when she was still in the 90 day blanking period             - discussed treatment options including medical management, cardioversion or possible repeat ablation if Dr. Pieter Braxton feels she is a candidate, she will proceed with cardioversion for now as she is symptomatic and discuss possible repeat ablation with Dr. Pieter Braxton     Left atrial flutter              - seen on EP study s/p roof line and anterior line ablations 10/7/20              - see above     Chronic diastolic heart failure                   - EF 60%             - appears well compensated     CAD              - stable              - on statin, beta blocker     AAA              - s/p repair in 2018 by Dr. Devan Francois HTN                - BP controlled             Thank you for allowing to us to participate in the care of [address] [place] [address], [facility] [address]  [place], [address] [facility]  Phone: [phone]  Fax:     [phone]

## 2021-01-12 NOTE — PRE SEDATION
flecainide (TAMBOCOR) 50 MG tablet Take 50 mg by mouth 2 times daily   Yes Historical Provider, MD   NIFEdipine (ADALAT CC) 60 MG extended release tablet Take 1 tablet by mouth daily 12/29/20  Yes Yahir Bullock MD   torsemide (DEMADEX) 20 MG tablet Take 1 tablet by mouth daily 10/16/20  Yes Rosalie Phalen, MD   rosuvastatin (CRESTOR) 20 MG tablet Take 1 tablet by mouth daily 8/13/20  Yes Yahir Bullock MD   isosorbide mononitrate (IMDUR) 30 MG extended release tablet TAKE ONE TABLET BY MOUTH DAILY 7/27/20  Yes Rosalie Phalen, MD   hydrALAZINE (APRESOLINE) 50 MG tablet Take 1 tablet by mouth every 8 hours 2/29/20  Yes Medina Austin MD   formoterol (PERFOROMIST) 20 MCG/2ML nebulizer solution Take 2 mLs by nebulization 2 times daily 7/24/19  Yes Francis Naidu, DO   Multiple Vitamins-Minerals (MULTI FOR HER 50+ PO) Take 1 tablet by mouth daily   Yes Historical Provider, MD   albuterol sulfate HFA (PROAIR HFA) 108 (90 Base) MCG/ACT inhaler Inhale 2 puffs into the lungs every 4 hours as needed for Wheezing or Shortness of Breath 11/2/20   Francis Naidu, DO   albuterol sulfate HFA (PROAIR HFA) 108 (90 Base) MCG/ACT inhaler Inhale 2 puffs into the lungs every 4 hours as needed for Wheezing or Shortness of Breath 7/23/20   Francis Naidu, DO   Arformoterol Tartrate (BROVANA) 15 MCG/2ML NEBU Take 2 mLs by nebulization 2 times daily 2/29/20   Mendy Diallo MD   albuterol (PROVENTIL) (2.5 MG/3ML) 0.083% nebulizer solution Take 3 mLs by nebulization every 4 hours as needed for Wheezing 2/17/20   Francis Naidu, DO   budesonide (PULMICORT) 0.5 MG/2ML nebulizer suspension Take 2 mLs by nebulization 2 times daily 7/24/19   Francis Naidu DO   nitroGLYCERIN (NITROSTAT) 0.4 MG SL tablet Place 1 tablet under the tongue every 5 minutes as needed for Chest pain 3/5/19   Roberto Guzman MD     Coumadin Use Last 7 Days:  no  Antiplatelet drug therapy use last 7 days: no Other anticoagulant use last 7 days: yes - Xarelto  Additional Medication Information:  n/a      Pre-Sedation Documentation and Exam:   I have personally completed a history, physical exam & review of systems for this patient (see notes).     Mallampati Airway Assessment:  Mallampati Class I - (soft palate, fauces, uvula & anterior/posterior tonsillar pillars are visible)    Prior History of Anesthesia Complications:   none    ASA Classification:  Class 2 - A normal healthy patient with mild systemic disease    Sedation/ Anesthesia Plan:   intravenous sedation    Medications Planned:   Methohexital    Patient is an appropriate candidate for plan of sedation: yes    Electronically signed by Arash Chang MD on 1/12/2021 at 2:58 PM

## 2021-01-25 ENCOUNTER — TELEPHONE (OUTPATIENT)
Dept: CARDIOLOGY CLINIC | Age: 76
End: 2021-01-25

## 2021-01-25 NOTE — TELEPHONE ENCOUNTER
Attempted to call patient. Left message on voicemail that [facility] approved Repatha 140 mg/ML valid thru 12/31/21. Requested patient to call insurance company to see where to fill to get the best price if she did not fill already. Also requested patient to call back office to confirm above information.

## 2021-01-25 NOTE — TELEPHONE ENCOUNTER
Rec'd fax from [facility]  fro Repatha Sure Click Inj 812 mg/ML  Valid thru 12/31/21.   Scanned into [facility]    Patient ID: [id number]    [facility] /[facility]: [id number]

## 2021-01-26 NOTE — PROGRESS NOTES
Cardiac Electrophysiology Consultation   Date: 1/27/2021  Reason for Consultation: Atrial fibrillation  Consult Requesting Physician:  Matteo Palma MD  Primary Care Physician: Karyn Lang MD    Chief Complaint:   Chief Complaint   Patient presents with    Follow-up     DCCV - discuss ablation - no cardiac complaints        HPI: Zeke Dill is a 77 y.o. patient with a history of CAD, atrial fibrillation, hypertension, aortic aneurysm, COPD, PE , DVT,chronic diastolic heart failure. She was seen in office on 9/14/2020 for initial consultation  to discuss treatment options for her atrial fibrillation. She reported that she was symptomatic with shortness of breath and palpitations. She made the decision to undergo ablation for treatment of her atrial fibrillation and on 10/7/2020 she underwent electrophysiology study with radiofrequency ablation of atrial fibrillation and pulmonary vein isolation. She was seen in office by Batsheva Trejo CNP on 1/7/2021 for her 3 months post ablation follow up and EKG confirmed atrial fibrillation/atrial flutter rate controlled at 72 bpm. She subsequently underwent cardioversion to restore NSR on 1/12/2021. Interval History: Today, she presents to office accompanied by her  for follow up s/p cardioversion 1/12/2021. Her first episode of atrial fibrillation occurred roughly 9/2020. EKG today shows NSR. She is compliant with her medications and tolerating them well. She denies chest pain/pressure, tightness, edema, shortness of breath, heart racing, palpitations, lightheadedness, dizziness, syncope, presyncope,  PND or orthopnea.      Past Medical History:   Diagnosis Date    AAA (abdominal aortic aneurysm) ([facility]     pt states it is 4cm    AAA (abdominal aortic aneurysm) without rupture (HCC) 2/10/2015    Atrial fibrillation (HCC)     CAD (coronary artery disease)     CHF (congestive heart failure) (HCC)     COPD (chronic obstructive pulmonary disease) ([facility]     History of blood clots     Hyperlipidemia     Hypertension         Past Surgical History:   Procedure Laterality Date    ABDOMINAL AORTIC ANEURYSM REPAIR      Endovascular abdominal AA    APPENDECTOMY  1990    incidental    BLADDER SUSPENSION      CATARACT REMOVAL      CHOLECYSTECTOMY  10/15/13    COLONOSCOPY  9/2/07    dr Nic Barriga and check in 5 years.  COLONOSCOPY  06/16/2017    ok dr arndt, repeat 5 years   [facility]    for benign tumor. just the uterus    JOINT REPLACEMENT  12/2013    right knee replacement    TONSILLECTOMY  as a child    TUMOR EXCISION      benign behind right ear about 2008       Allergies: Allergies   Allergen Reactions    Morphine Rash     rash    Other Rash       Medication:   Prior to Admission medications    Medication Sig Start Date End Date Taking?  Authorizing Provider   metoprolol succinate (TOPROL XL) 50 MG extended release tablet TAKE ONE TABLET BY MOUTH DAILY 1/11/21   Dejuan Bean MD   rivaroxaban (XARELTO) 20 MG TABS tablet TAKE ONE TABLET BY MOUTH DAILY WITH BREAKFAST 1/8/21   Kimberli [place], DO   flecainide (TAMBOCOR) 50 MG tablet Take 50 mg by mouth 2 times daily    Historical Provider, MD   NIFEdipine (ADALAT CC) 60 MG extended release tablet Take 1 tablet by mouth daily 12/29/20   Oziel Dallas MD   albuterol sulfate HFA (PROAIR HFA) 108 (90 Base) MCG/ACT inhaler Inhale 2 puffs into the lungs every 4 hours as needed for Wheezing or Shortness of Breath 11/2/20   Kimberli [place], DO   torsemide (DEMADEX) 20 MG tablet Take 1 tablet by mouth daily 10/16/20   Pilo Kruger MD   rosuvastatin (CRESTOR) 20 MG tablet Take 1 tablet by mouth daily 8/13/20   Oziel Dallas MD   isosorbide mononitrate (IMDUR) 30 MG extended release tablet TAKE ONE TABLET BY MOUTH DAILY 7/27/20   Dejuan Bean MD   albuterol sulfate HFA (PROAIR HFA) 108 (90 Base) MCG/ACT inhaler Inhale 2 puffs into the lungs every 4 hours as needed for Wheezing or Shortness of Breath 7/23/20   Charan De La Fuente,    Arformoterol Tartrate [facility]) 15 MCG/2ML NEBU Take 2 mLs by nebulization 2 times daily 2/29/20   Kristofer Beth MD   hydrALAZINE (APRESOLINE) 50 MG tablet Take 1 tablet by mouth every 8 hours 2/29/20   [place] [facility] Salvador Coombs MD   albuterol (PROVENTIL) (2.5 MG/3ML) 0.083% nebulizer solution Take 3 mLs by nebulization every 4 hours as needed for Wheezing 2/17/20   Charan De La Fuente,    budesonide (PULMICORT) 0.5 MG/2ML nebulizer suspension Take 2 mLs by nebulization 2 times daily 7/24/19   Charan De La Fuente,    formoterol (PERFOROMIST) 20 MCG/2ML nebulizer solution Take 2 mLs by nebulization 2 times daily 7/24/19   Charan De La Fuente, DO   nitroGLYCERIN (NITROSTAT) 0.4 MG SL tablet Place 1 tablet under the tongue every 5 minutes as needed for Chest pain 3/5/19   Kika House MD   Multiple Vitamins-Minerals (MULTI FOR HER 50+ PO) Take 1 tablet by mouth daily    Historical Provider, MD       Social History:   reports that she quit smoking about 7 years ago. Her smoking use included cigarettes. She started smoking about 44 years ago. She has a 37.00 pack-year smoking history. She has never used smokeless tobacco. She reports that she does not drink alcohol or use drugs. Family History:  family history includes Heart Disease in her father; Hypertension in an other family member. Reviewed.  Denies family history of sudden cardiac death, arrhythmia, premature CAD    Review of System:    · General ROS: negative for - chills, fever   · Psychological ROS: negative for - anxiety or depression  · Ophthalmic ROS: negative for - eye pain or loss of vision  · ENT ROS: negative for - epistaxis, headaches, nasal discharge, sore throat   · Allergy and Immunology ROS: negative for - hives, nasal congestion   · Hematological and Lymphatic ROS: negative for - bleeding problems, blood clots, bruising or jaundice  · Endocrine ROS: negative for - skin changes, temperature intolerance or unexpected weight changes  · Respiratory ROS: negative for - cough, hemoptysis, pleuritic pain, sputum changes or wheezing. + SOB  · Cardiovascular ROS: Per HPI. · Gastrointestinal ROS: negative for - abdominal pain, blood in stools, diarrhea, hematemesis, melena, nausea/vomiting or swallowing difficulty/pain  · Genito-Urinary ROS: negative for - dysuria or incontinence  · Musculoskeletal ROS: negative for - joint swelling or muscle pain  · Neurological ROS: negative for - confusion, dizziness, gait disturbance, headaches, numbness/tingling, seizures, speech problems, tremors, visual changes or weakness  · Dermatological ROS: negative for - rash    Physical Examination:  Vitals:    21 0926   BP: 130/70   Pulse: 62   Temp: 96.4 °F (35.8 °C)   SpO2: 92%       · Constitutional: Oriented. No distress. · Head: Normocephalic and atraumatic. · Mouth/Throat: Oropharynx is clear and moist.   · Eyes: Conjunctivae normal. EOM are normal.   · Neck: Normal range of motion. Neck supple. No rigidity. No JVD present. · Cardiovascular: Normal rate, regular rhythm, S1&S2 and intact distal pulses. · Pulmonary/Chest: Bilateral respiratory sounds. No wheezes. No rhonchi. · Abdominal: Soft. Bowel sounds present. No distension, No tenderness. · Musculoskeletal: No tenderness. No edema    · Lymphadenopathy: Has no cervical adenopathy. · Neurological: Alert and oriented. Cranial nerve appears intact, No Gross deficit   · Skin: Skin is warm and dry. No rash noted. · Psychiatric: Has a normal mood, affect and behavior     Labs:  Reviewed. EC2021 reviewed, sinus bradycardia with W[place] prolongation, with rate variation, Kofi = 318, cv = 15, with v-rate of 56 bpm with QRS duration 90 ms. No pathologic Q waves, ventricular pre-excitation, or QT prolongation. Studies:   1.  Event monitor:   none    2. Echo: 20  There is moderate concentric left ventricular hypertrophy. Patient appears to be in atrial fibrillation. Ejection fraction is estimated to be 50-60%. Indeterminate diastolic function. Mild aortic regurgitation. Mild tricuspid regurgitation. Mild mitral regurgitation with mitral annular calcification    3. [facility] 2/19/18   Summary    Abnormal study. There is a moderate sized, mild intensity, partially    reversible defect of the mid to apical anterior and mid anterolateral walls    which is consistent with ischemia. There is breast attenuation but stress    images appear worse.    Normal LV size and systolic function.    Overall findings represent an intermediate risk study       4. Cath: 3/5/18  OVERALL IMPRESSION  1.  Again, 100% proximal right coronary artery occlusion with left to right collaterals. 2.  Mild disease of the distal left main trunk. 3.  20% to 30% ostial left anterior descending artery stenosis with collaterals from LAD to the right coronary artery. 4.  30% to 40% stenosis of the proximal circumflex artery. 5.  Normal left ventricular systolic function with estimated EF of 55% to 60%. 6.  Slightly enlarged aortic root with no evidence of aortic stenosis or regurgitation.     In view of the above findings, we will okay the patient for her upcoming  aortic aneurysm surgery from cardiac standpoint. [facility]: ([facility]) 4/2017   of RCA chronic LAD 10-20% RA 4 PA24/9 16 wedge 9 LVEDP markedly elevated 30     Right Heart Cath 2/28/2020  1.  _____ normal right cardiac pressure. 2.  Elevated pulmonary capillary wedge pressure with a mean pulmonary  capillary wedge of 29 mmHg. 3.  Normal cardiac output and indices. 4.  No oxygen step off to suggest ASD/PFO. 6. Carotid US 10/2017 at [facility]:  1. Estimated diameter reduction of the right internal carotid artery is  less than 50%. 2.  Estimated diameter reduction of the left internal carotid artery is  50-69%.   3.  The bilateral common carotid arteries reveal no evidence of significant stenosis. 4.  There is greater than 50% stenosis of the right external carotid  artery. 5.  There is no evidence of significant stenosis in the left external  carotid artery. 6.  The bilateral vertebral arteries are patent with antegrade flow. 7.  The bilateral subclavian arteries reveal no evidence of significant  stenosis. 8.  There has been no significant change from the previous study of  4/21/2017.     The MCOT, echocardiogram, stress test, and coronary angiography/PCI were reviewed by myself and used for my plan of care. Procedures:  1. Endovascular AAA repair on 3/21/2018 by Dr. Angel Loera  2. Electrophysiology study with radiofrequency ablation of atrial fibrillation and pulmonary vein isolation 10/7/2020.   3. Successful external DC cardioversion of symptomatic, persistent atrial fibrillation 1/12/2021. Assessment/Plan:     Atrial fibrillation  EKG today SB  She has a [id number] Score 3 (HTN, AGE, female gender)  On Xarelto 20 mg daily for  thromboembolic risk reduction. Tolerating well no signs or symptoms of abnormal bruising or bleeding. S/p radiofrequency ablation of atrial fibrillation on 10/7/2020. At her three months post ablation follow up appointment EKG confirmed atrial fibrillation/atrial flutter rate controlled at 72 bpm. She subsequently underwent cardioversion to restored NSR on 1/12/2021. We educated the patient that atrial fibrillation is a worsening and progressive disease, with more frequent episodes that will ensue. Subsequent episodes usually become more sustained to the extent that many individuals would then develop persistent atrial fibrillation. Once persistence is reached, permanent atrial fibrillation is inevitable. We also discussed the fact that atrial fibrillation is associated with stroke, including life-threatening stroke, and therefore oral anticoagulation is warranted depending on the patient's [id number] score.      We discussed different management options for atrial fibrillation including their risks and benefits. These options include use of cardioversion (mainly for persisting atrial fibrillation or atrial flutter) which provides an effective immediate therapy with success rates of 75% or higher, but it provides no short nor long term efficacy. Anti-arrhythmic medications provide a very effective short term therapy, but even with our most potent anti-arrhythmic medication there is limited long term efficacy (clinical studies have shown that 40% of patients remain atrial fibrillation-free after 4 years of follow-up after starting one of the more powerful anti-arrhythmic medication (amiodarone), and, if extrapolated, may have further diminishing success as time goes on). Atrial fibrillation ablation is a potentially curative therapy with very reasonable success rate after a first time procedure and with improving success rates with subsequent procedures. The risks, benefits and alternatives of the atrial fibrillation ablation procedure were discussed with the patient. The risks including, but not limited to, bleeding, infection, radiation exposure, injury to vascular, cardiac and surrounding structures (including pneumothorax), stroke, cardiac perforation, tamponade, need for emergent open heart surgery, need for pacemaker implantation, injury to the phrenic nerve, injury to the esophagus, myocardial infarction and death were discussed in detail. The patient was also counseled at length about the risks of abhay Covid-19 in the niesha-operative and post-operative states including the recovery window of their procedure. The patient was made aware that abhay Covid-19 after a surgical procedure may worsen their prognosis for recovering from the virus and lend to a higher morbidity and or mortality risk. The patient was given the option of postponing their procedure.      I spent 40 minutes face to face with the patient, with greater than 50% of that time spent in counseling on the above. The patient opted to proceed with the atrial fibrillation ablation. We will schedule for a radiofrequency ablation with [employer] system with a KAYE procedure immediately prior to this ablation. We will hold Xarelto for 12 hours prior to procedure. We will order BMP, CBC, PT/INR, and Type & Screen prior to the procedure. Chronic diastolic heart failure  NYHA class II-III. Not on Aldactone due to elevated Creatine. LVEF 60 %   Per Echo 9/24/2020, grade II diastolic dysfunction with elevated LV filling pressures. On guideline directed medical therapy. Beta Blocker: Toprol  XL               Aldosterone Antagonist: Not on due to elevated creatine                     Diuretic: Torsemide              ARB/ACE/ARNI: Not on due to elevated creatine  Euvolemic. She has not been calculating her sodium intake. She does not know her dry weight. She follows with Dr. Aidee Waddell for management. Much time was spent counseling the patient on healthier lifestyle, following a low sodium diet <2000 mg of sodium a day and dramatically decreasing the intake of processed sugar. Instructed to stay well hydrated as dehydration can cause and or exacerbate her symtoms 10-12 (8 oz glasses)  of water daily. Increasing intake on hot days 12-15 (8 oz glasses) of water daily. Coronary artery disease involving native coronary artery of native heart without angina pectoris  Denies angina. Continue with medical management and risk factor modification including statin, and beta blocker. Hypertension  Stable. AAA (abdominal aortic aneurysm) without rupture   Underwent endovascular AAA repair on 3/21/18 by Dr. Alysha Schneider. Follows vascular surgery. Chronic obstructive pulmonary disease   Chronic shortness of breath. Centrilobular emphysema noted on CT scan.    Managed by Dr. Frandy Abreu MD.    Follow up three months after ablation with [facility] FRANNY Aguilar. Continue routine follow up with Dr. Sky Munguia. Thank you for allowing me to participate in the care of Migue Fisher. All questions and concerns were addressed to the patient/family. Alternatives to my treatment were discussed. This note was scribed in the presence of Arash Chang MD by Evelio Lee RN. The scribe's documentation has been prepared under my direction and personally reviewed by me in its entirety. I confirm that the note above accurately reflects all work, physical examination, the discussion of treatments and procedures, and medical decision making performed by me.     Arash Chang MD, MS, [facility], [facility]  Cardiac Electrophysiology  [address]  [address] [facility], [address] [place] [address]  Jon Shah [facility] [address]  [phone]

## 2021-01-27 ENCOUNTER — OFFICE VISIT (OUTPATIENT)
Dept: CARDIOLOGY CLINIC | Age: 76
End: 2021-01-27
Payer: MEDICARE

## 2021-01-27 VITALS
BODY MASS INDEX: 36.37 KG/M2 | HEART RATE: 62 BPM | SYSTOLIC BLOOD PRESSURE: 130 MMHG | WEIGHT: 213 LBS | TEMPERATURE: 96.4 F | HEIGHT: 64 IN | DIASTOLIC BLOOD PRESSURE: 70 MMHG | OXYGEN SATURATION: 92 %

## 2021-01-27 DIAGNOSIS — I50.32 CHRONIC DIASTOLIC HEART FAILURE (HCC): ICD-10-CM

## 2021-01-27 DIAGNOSIS — I25.10 CORONARY ARTERY DISEASE INVOLVING NATIVE CORONARY ARTERY OF NATIVE HEART WITHOUT ANGINA PECTORIS: ICD-10-CM

## 2021-01-27 DIAGNOSIS — Z79.01 CURRENT USE OF ANTICOAGULANT THERAPY: ICD-10-CM

## 2021-01-27 DIAGNOSIS — I71.40 AAA (ABDOMINAL AORTIC ANEURYSM) WITHOUT RUPTURE: ICD-10-CM

## 2021-01-27 DIAGNOSIS — I48.0 PAF (PAROXYSMAL ATRIAL FIBRILLATION) (HCC): Primary | ICD-10-CM

## 2021-01-27 DIAGNOSIS — J44.9 CHRONIC OBSTRUCTIVE PULMONARY DISEASE, UNSPECIFIED COPD TYPE (HCC): ICD-10-CM

## 2021-01-27 PROCEDURE — 1036F TOBACCO NON-USER: CPT | Performed by: INTERNAL MEDICINE

## 2021-01-27 PROCEDURE — 1123F ACP DISCUSS/DSCN MKR DOCD: CPT | Performed by: INTERNAL MEDICINE

## 2021-01-27 PROCEDURE — 3023F SPIROM DOC REV: CPT | Performed by: INTERNAL MEDICINE

## 2021-01-27 PROCEDURE — G8482 FLU IMMUNIZE ORDER/ADMIN: HCPCS | Performed by: INTERNAL MEDICINE

## 2021-01-27 PROCEDURE — G8399 PT W/DXA RESULTS DOCUMENT: HCPCS | Performed by: INTERNAL MEDICINE

## 2021-01-27 PROCEDURE — G8926 SPIRO NO PERF OR DOC: HCPCS | Performed by: INTERNAL MEDICINE

## 2021-01-27 PROCEDURE — G8417 CALC BMI ABV UP PARAM F/U: HCPCS | Performed by: INTERNAL MEDICINE

## 2021-01-27 PROCEDURE — 99215 OFFICE O/P EST HI 40 MIN: CPT | Performed by: INTERNAL MEDICINE

## 2021-01-27 PROCEDURE — 1090F PRES/ABSN URINE INCON ASSESS: CPT | Performed by: INTERNAL MEDICINE

## 2021-01-27 PROCEDURE — 4040F PNEUMOC VAC/ADMIN/RCVD: CPT | Performed by: INTERNAL MEDICINE

## 2021-01-27 PROCEDURE — 93000 ELECTROCARDIOGRAM COMPLETE: CPT | Performed by: INTERNAL MEDICINE

## 2021-01-27 PROCEDURE — G8427 DOCREV CUR MEDS BY ELIG CLIN: HCPCS | Performed by: INTERNAL MEDICINE

## 2021-01-27 PROCEDURE — 3017F COLORECTAL CA SCREEN DOC REV: CPT | Performed by: INTERNAL MEDICINE

## 2021-01-27 NOTE — PATIENT INSTRUCTIONS
Watch \"That Sugar Film\" on [employer], which discusses the negative impact of processed sugar has on the body. If you have any question regarding your atrial fibrillation ablation please contact Dr. Oziel Jennings Nurse Anny Lechuga at [phone]. Atrial Fibrillation Ablation Pre procedure Instructions    Date: Wednesday 2-    Arrive at: 11:30 am    Procedure time: 1:00 pm    The morning of your procedure you will park in the hospital parking lot and report directly to the cath lab to check in.      Pre-Procedure Instructions   1. You will need to fast (nothing to eat or drink) for at least 8 hours prior to procedure. 2. You will need to hold your Flecanide for 7 days prior to the procedure. 3. You will need to hold your Eliquis  for 12 hours prior to the procedure. 4. You may hold your Torsemide (Demadex) the morning of the procedure for your comfort. 5. You may take your Metoprolol succinate and NIFEdipine (ADALAT CC) and hydrALAZINE (APRESOLINE), the morning of the procedure with sips of water  6. If you are a diabetic you will need to hold all diabetic medications including, Metformin the morning of the procedure. If you take Lantus/Levemir only take ½ your normal dose the evening before. 7. Do not use any lotions, creams or perfume the morning of procedure. 8. You will need to complete pre-procedure lab work prior to completing your COVID test which is scheduled for Thursday 2- at 11 am..  9. Please have a responsible adult to drive you home after procedure, you should go home same day, but there is always a possibility of an overnight stay. 10. Cath lab will provide you with all post procedure instructions  11.  A 3 month follow up will be scheduled with Dr. Oziel Jennings Nurse practitioner Martina Burciaga CNP post procedure    ________________________________________________________________________________________________ You will need to complete Pre-Procedure lab work prior to your completing your COVID test   The address for your lab work is:   [address] [place] [address]. [facility]  Phone: [phone]  The hours are Mon -Fri.  7:00 am  5:00 pm     No appointment necessary    Please allow for at least 30 mins - 1 hours for your lab work to be completed. _________________________________________________________________________________________    Your COVID 19 test is scheduled for Thursday 2- at 11 am.  The address for the COVID test is [address] [place] [address]  The testing site is located at the [facility]  There will be a sign that says PRE-OP TESTING  Follow the neon cones with arrows to the testing tent.    ________________________________________________________________________________________________    Patient Education        Atrial Fibrillation: Care Instructions  Your Care Instructions     Atrial fibrillation is an irregular and often fast heartbeat. Treating this condition is important for several reasons. It can cause blood clots, which can travel from your heart to your brain and cause a stroke. If you have a fast heartbeat, you may feel lightheaded, dizzy, and weak. An irregular heartbeat can also increase your risk for heart failure. Atrial fibrillation is often the result of another heart condition, such as high blood pressure or coronary artery disease. Making changes to improve your heart condition will help you stay healthy and active. Follow-up care is a key part of your treatment and safety. Be sure to make and go to all appointments, and call your doctor if you are having problems. It's also a good idea to know your test results and keep a list of the medicines you take. How can you care for yourself at home?   Medicines   · Take your medicines exactly as prescribed. Call your doctor if you think you are having a problem with your medicine. You will get more details on the specific medicines your doctor prescribes.     · If your doctor has given you a blood thinner to prevent a stroke, be sure you get instructions about how to take your medicine safely. Blood thinners can cause serious bleeding problems.     · Do not take any vitamins, over-the-counter drugs, or herbal products without talking to your doctor first.   Lifestyle changes    · Do not smoke. Smoking can increase your chance of a stroke and heart attack. If you need help quitting, talk to your doctor about stop-smoking programs and medicines. These can increase your chances of quitting for good.     · Eat a heart-healthy diet.     · Stay at a healthy weight. Lose weight if you need to.     · Limit alcohol to 2 drinks a day for men and 1 drink a day for women. Too much alcohol can cause health problems.     · Avoid colds and flu. Get a pneumococcal vaccine shot. If you have had one before, ask your doctor whether you need another dose. Get a flu shot every year. If you must be around people with colds or flu, wash your hands often. Activity    · If your doctor recommends it, get more exercise. Walking is a good choice. Bit by bit, increase the amount you walk every day. Try for at least 30 minutes on most days of the week. You also may want to swim, bike, or do other activities. Your doctor may suggest that you join a cardiac rehabilitation program so that you can have help increasing your physical activity safely.     · Start light exercise if your doctor says it is okay. Even a small amount will help you get stronger, have more energy, and manage stress. Walking is an easy way to get exercise. Start out by walking a little more than you did in the hospital. Gradually increase the amount you walk.   · When you exercise, watch for signs that your heart is working too hard. You are pushing too hard if you cannot talk while you are exercising. If you become short of breath or dizzy or have chest pain, sit down and rest immediately.     · Check your pulse regularly. Place two fingers on the artery at the palm side of your wrist, in line with your thumb. If your heartbeat seems uneven or fast, talk to your doctor. When should you call for help? Call 911 anytime you think you may need emergency care. For example, call if:    · You have symptoms of a heart attack. These may include:  ? Chest pain or pressure, or a strange feeling in the chest.  ? Sweating. ? Shortness of breath. ? Nausea or vomiting. ? Pain, pressure, or a strange feeling in the back, neck, jaw, or upper belly or in one or both shoulders or arms. ? Lightheadedness or sudden weakness. ? A fast or irregular heartbeat. After you call 911, the  may tell you to chew 1 adult-strength or 2 to 4 low-dose aspirin. Wait for an ambulance. Do not try to drive yourself.     · You have symptoms of a stroke. These may include:  ? Sudden numbness, tingling, weakness, or loss of movement in your face, arm, or leg, especially on only one side of your body. ? Sudden vision changes. ? Sudden trouble speaking. ? Sudden confusion or trouble understanding simple statements. ? Sudden problems with walking or balance. ? A sudden, severe headache that is different from past headaches.     · You passed out (lost consciousness). Call your doctor now or seek immediate medical care if:    · You have new or increased shortness of breath.     · You feel dizzy or lightheaded, or you feel like you may faint.     · Your heart rate becomes irregular.     · You can feel your heart flutter in your chest or skip heartbeats. Tell your doctor if these symptoms are new or worse. Watch closely for changes in your health, and be sure to contact your doctor if you have any problems. Where can you learn more? Go to https://Wanamakerpepiceweb.NeuroSigma. org and sign in to your [employer] account. Enter U020 in the Bright Computing box to learn more about \"Atrial Fibrillation: Care Instructions. \"     If you do not have an account, please click on the \"Sign Up Now\" link. Current as of: December 16, 2019               Content Version: 12.6  © 8118-6189 Appetite+. Care instructions adapted under license by [facility] ([facility]). If you have questions about a medical condition or this instruction, always ask your healthcare professional. [address] any warranty or liability for your use of this information. Patient Education        Learning About Catheter Ablation for Heart Rhythm Problems  What is catheter ablation? Catheter ablation is a procedure that treats heart rhythm problems. These problems include atrial fibrillation, supraventricular tachycardia (SVT), atrial flutter, and ventricular tachycardia. Your heart should have a strong, steady beat. That beat is controlled by the heart's electrical system. Sometimes that system misfires. This causes a heartbeat that is too fast and isn't steady. Catheter ablation is a way to get into your heart and fix the problem. Ablation is not surgery. How is catheter ablation done? Your doctor inserts thin tubes called catheters into a blood vessel in your groin, arm, or neck. Then your doctor feeds them into the heart. Wires in the catheters help the doctor find the problem areas. Then the doctor uses the wires to send energy to destroy the tiny areas of heart tissue that are causing the problems. It may seem like a bad idea to destroy parts of your heart on purpose. But the areas that are destroyed are very tiny. They should not affect your heart's ability to do its job. You may be awake during the procedure. Or you may be asleep. The doctor will give you medicines to help you feel relaxed and to numb the areas where the catheters go in. You may feel a little uncomfortable, but you should not feel pain. What can you expect after catheter ablation? You may stay overnight in the hospital. How long you stay in the hospital depends on the type of ablation you have. Do not exercise hard or lift anything heavy for a week. You will probably be able to go back to work and to your normal routine in 1 or 2 days. You may have swelling, bruising, or a small lump around the site where the catheters went into your body. These should go away in 3 to 4 weeks. You may have to take some medicines for a while. Follow-up care is a key part of your treatment and safety. Be sure to make and go to all appointments, and call your doctor if you are having problems. It's also a good idea to know your test results and keep a list of the medicines you take. Where can you learn more? Go to https://Moveline.Data3Sixty. org and sign in to your NanoVasc account. Enter U916 in the BiolineRx box to learn more about \"Learning About Catheter Ablation for Heart Rhythm Problems. \"     If you do not have an account, please click on the \"Sign Up Now\" link. Current as of: December 16, 2019               Content Version: 12.6  © 8657-4803 Signix, Incorporated. Care instructions adapted under license by [facility] [employer] [facility] ([facility]). If you have questions about a medical condition or this instruction, always ask your healthcare professional. [place] [address] any warranty or liability for your use of this information. Patient Education        Electrophysiology Study and Catheter Ablation: Before Your Procedure  What is an electrophysiology study and catheter ablation?   · Be sure you have someone to take you home. Anesthesia and pain medicine will make it unsafe for you to drive or get home on your own.     · Understand exactly what procedure is planned, along with the risks, benefits, and other options.     · Tell your doctor ALL the medicines, vitamins, supplements, and herbal remedies you take. Some may increase the risk of problems during your procedure. Your doctor will tell you if you should stop taking any of them before the procedure and how soon to do it.     · If you take aspirin or some other blood thinner, ask your doctor if you should stop taking it before your procedure. Make sure that you understand exactly what your doctor wants you to do. These medicines increase the risk of bleeding.     · Make sure your doctor and the hospital have a copy of your advance directive. If you don't have one, you may want to prepare one. It lets others know your health care wishes. It's a good thing to have before any type of surgery or procedure. What happens on the day of the procedure? · Follow the instructions exactly about when to stop eating and drinking. If you don't, your procedure may be canceled. If your doctor told you to take your medicines on the day of the procedure, take them with only a sip of water.     · Take a bath or shower before you come in for your procedure. Do not apply lotions, perfumes, deodorants, or nail polish.     · Take off all jewelry and piercings. And take out contact lenses, if you wear them. At the hospital or surgery center   · Bring a picture ID.     · You will be kept comfortable and safe by your anesthesia provider. The anesthesia may make you sleep. Or it may just numb the area being worked on.     · This procedure can take 2 to 6 hours. In rare cases, it can take longer.   · After the procedure, pressure will be applied to the area where the catheter was put in your blood vessel. Then the area may be covered with a bandage or a compression device. This will prevent bleeding.     · Nurses will check your heart rate and blood pressure. The nurse will also check the catheter site for bleeding.     · If the catheter was put in your groin, you will need to lie still and keep your leg straight for several hours. The nurse may put a weighted bag on your leg to keep it still.     · If the catheter was put in your arm, you may be able to sit up and get out of bed right away. But you will need to keep your arm still for at least 1 hour.     · You may have a bruise or a small lump where the catheter was put in your blood vessel. This is normal and will go away. When should you call your doctor? · You have questions or concerns.     · You don't understand how to prepare for your procedure.     · You become ill before the procedure (such as fever, flu, or a cold).     · You need to reschedule or have changed your mind about having the procedure. Where can you learn more? Go to https://FLX Micro.Biodesy. org and sign in to your Telespree account. Enter Y870 in the Bimici box to learn more about \"Electrophysiology Study and Catheter Ablation: Before Your Procedure. \"     If you do not have an account, please click on the \"Sign Up Now\" link. Current as of: December 16, 2019               Content Version: 12.6  © 0450-8934 Feast, Incorporated. Care instructions adapted under license by [facility] ([facility]). If you have questions about a medical condition or this instruction, always ask your healthcare professional. [place] [address] any warranty or liability for your use of this information.          Patient Education        Electrophysiology Study and Catheter Ablation: What to Expect at [address] [place] [address] You had an electrophysiology study for a problem with your heartbeat. You may also have had a catheter ablation to try to correct the problem. You may have swelling, bruising, or a small lump around the site where the catheters went into your body. These should go away in 3 to 4 weeks. Do not exercise hard or lift anything heavy for a week. You may be able to go back to work and to your normal routine in 1 or 2 days. This care sheet gives you a general idea about how long it will take for you to recover. But each person recovers at a different pace. Follow the steps below to get better as quickly as possible. How can you care for yourself at home? Activity    · For 1 week, do not lift anything that would make you strain. This may include heavy grocery bags and milk containers, a heavy briefcase or backpack, cat litter or dog food bags, a vacuum , or a child.     · For 1 week, do not exercise hard or do any activity that could strain your blood vessels or the site where the catheters went into your body.     · Ask your doctor when it is okay to have sex. Diet    · You can eat your normal diet. If your stomach is upset, try bland, low-fat foods like plain rice, broiled chicken, toast, and yogurt.     · Drink plenty of fluids (unless your doctor tells you not to). Medicines    · Your doctor will tell you if and when you can restart your medicines. He or she will also give you instructions about taking any new medicines.     · If you take aspirin or some other blood thinner, ask your doctor if and when to start taking it again. Make sure that you understand exactly what your doctor wants you to do.     · Ask your doctor if you can take acetaminophen (Tylenol) for pain. Do not take aspirin for 3 days, unless your doctor says it is okay.   · Check with your doctor before you take aspirin or anti-inflammatory medicines to reduce pain and swelling. These include ibuprofen (Advil, Motrin) and naproxen (Aleve).   · Make sure you know which heart medicines to continue and which ones to stop. Ask your doctor if you aren't sure. Catheter site care    · You can remove your bandages the day after the procedure.     · You may shower 24 to 48 hours after the procedure, if your doctor okays it. Pat the incision dry.     · Do not soak the catheter site until it is healed. Don't take a bath for 1 week, or until your doctor tells you it is okay.     · Watch for bleeding from the site. A small amount of blood (up to the size of a quarter) on the bandage can be normal.     · If you are bleeding, lie down and press on the area for 15 minutes to try to make it stop. If the bleeding does not stop, call your doctor or seek immediate medical care. Follow-up care is a key part of your treatment and safety. Be sure to make and go to all appointments, and call your doctor if you are having problems. It's also a good idea to know your test results and keep a list of the medicines you take. When should you call for help? Call  911 anytime you think you may need emergency care. For example, call if:    · You passed out (lost consciousness).     · You have symptoms of a heart attack. These may include:  ? Chest pain or pressure, or a strange feeling in the chest.  ? Sweating. ? Shortness of breath. ? Nausea or vomiting. ? Pain, pressure, or a strange feeling in the back, neck, jaw, or upper belly, or in one or both shoulders or arms. ? Lightheadedness or sudden weakness. ? A fast or irregular heartbeat. After you call 911, the  may tell you to chew 1 adult-strength or 2 to 4 low-dose aspirin. Wait for an ambulance. Do not try to drive yourself.     · You have symptoms of a stroke.  These may include: ? Sudden numbness, tingling, weakness, or loss of movement in your face, arm, or leg, especially on only one side of your body. ? Sudden vision changes. ? Sudden trouble speaking. ? Sudden confusion or trouble understanding simple statements. ? Sudden problems with walking or balance. ? A sudden, severe headache that is different from past headaches. Call your doctor now or seek immediate medical care if:    · You are bleeding from the area where the catheter was put in your blood vessel.     · You have a fast-growing, painful lump at the catheter site.     · You have signs of infection, such as:  ? Increased pain, swelling, warmth, or redness. ? Red streaks leading from the catheter site. ? Pus draining from the catheter site. ? A fever.     · Your leg, arm, or hand is painful, looks blue, or feels cold, numb, or tingly. Watch closely for any changes in your health, and be sure to contact your doctor if you have any problems. Where can you learn more? Go to https://Brainly.VIPAAR. org and sign in to your [email or web address] account. Enter [phone] in the [facility] box to learn more about \"Electrophysiology Study and Catheter Ablation: What to Expect at Home. \"     If you do not have an account, please click on the \"Sign Up Now\" link. Current as of: December 16, 2019               Content Version: 12.6  © 4953-3123 Invajo, Incorporated. Care instructions adapted under license by [facility] ([facility]). If you have questions about a medical condition or this instruction, always ask your healthcare professional. [place] [address] any warranty or liability for your use of this information.          Patient Education        Low Sodium Diet (2,000 Milligram): Care Instructions  Your Care Instructions Too much sodium causes your body to hold on to extra water. This can raise your blood pressure and force your heart and kidneys to work harder. In very serious cases, this could cause you to be put in the hospital. It might even be life-threatening. By limiting sodium, you will feel better and lower your risk of serious problems. The most common source of sodium is salt. People get most of the salt in their diet from canned, prepared, and packaged foods. Fast food and restaurant meals also are very high in sodium. Your doctor will probably limit your sodium to less than 2,000 milligrams (mg) a day. This limit counts all the sodium in prepared and packaged foods and any salt you add to your food. Follow-up care is a key part of your treatment and safety. Be sure to make and go to all appointments, and call your doctor if you are having problems. It's also a good idea to know your test results and keep a list of the medicines you take. How can you care for yourself at home? Read food labels  · Read labels on cans and food packages. The labels tell you how much sodium is in each serving. Make sure that you look at the serving size. If you eat more than the serving size, you have eaten more sodium. · Food labels also tell you the Percent Daily Value for sodium. Choose products with low Percent Daily Values for sodium. · Be aware that sodium can come in forms other than salt, including monosodium glutamate (MSG), sodium citrate, and sodium bicarbonate (baking soda). MSG is often added to Asian food. When you eat out, you can sometimes ask for food without MSG or added salt. Buy low-sodium foods  · Buy foods that are labeled \"unsalted\" (no salt added), \"sodium-free\" (less than 5 mg of sodium per serving), or \"low-sodium\" (less than 140 mg of sodium per serving). Foods labeled \"reduced-sodium\" and \"light sodium\" may still have too much sodium. Be sure to read the label to see how much sodium you are getting. · Buy fresh vegetables, or frozen vegetables without added sauces. Buy low-sodium versions of canned vegetables, soups, and other canned goods. Prepare low-sodium meals  · Cut back on the amount of salt you use in cooking. This will help you adjust to the taste. Do not add salt after cooking. One teaspoon of salt has about 2,300 mg of sodium. · Take the salt shaker off the table. · Flavor your food with garlic, lemon juice, onion, vinegar, herbs, and spices. Do not use soy sauce, lite soy sauce, steak sauce, onion salt, garlic salt, celery salt, mustard, or ketchup on your food. · Use low-sodium salad dressings, sauces, and ketchup. Or make your own salad dressings and sauces without adding salt. · Use less salt (or none) when recipes call for it. You can often use half the salt a recipe calls for without losing flavor. Other foods such as rice, pasta, and grains do not need added salt. · Rinse canned vegetables, and cook them in fresh water. This removes somebut not allof the salt. · Avoid water that is naturally high in sodium or that has been treated with water softeners, which add sodium. Call your local water company to find out the sodium content of your water supply. If you buy bottled water, read the label and choose a sodium-free brand. Avoid high-sodium foods  · Avoid eating:  ? Smoked, cured, salted, and canned meat, fish, and poultry. ? Ham, barboza, hot dogs, and luncheon meats. ? Regular, hard, and processed cheese and regular peanut butter. ? Crackers with salted tops, and other salted snack foods such as pretzels, chips, and salted popcorn. ? Frozen prepared meals, unless labeled low-sodium. ? Canned and dried soups, broths, and bouillon, unless labeled sodium-free or low-sodium. ? Canned vegetables, unless labeled sodium-free or low-sodium. ? [place] fries, pizza, tacos, and other fast foods. ? Pickles, olives, ketchup, and other condiments, especially soy sauce, unless labeled sodium-free or low-sodium. Where can you learn more? Go to https://[email or web address].[email or web address] org and sign in to your SmartExposee account. Enter F604 in the [facility] box to learn more about \"Low Sodium Diet (2,000 Milligram): Care Instructions. \"     If you do not have an account, please click on the \"Sign Up Now\" link. Current as of: August 22, 2019               Content Version: 12.6  © 5046-7228 [employer]. Care instructions adapted under license by [facility] [place] ([facility]). If you have questions about a medical condition or this instruction, always ask your healthcare professional. [address] any warranty or liability for your use of this information. Patient Education        How to Read a Food Label to Limit Sodium: Care Instructions  Your Care Instructions  Sodium causes your body to hold on to extra water. This can raise your blood pressure and force your heart and kidneys to work harder. In very serious cases, this could cause you to be put in the hospital. It might even be life-threatening. By limiting sodium, you will feel better and lower your risk of serious problems. Processed foods, fast food, and restaurant foods are the major sources of dietary sodium. The most common name for sodium is salt. Try to limit how much sodium you eat to less than 2,300 milligrams (mg) a day. If you limit your sodium to 1,500 mg a day, you can lower your blood pressure even more. This limit counts all the salt that you eat in foods you cook or in packaged foods. Keep a list of everything you eat and drink. Follow-up care is a key part of your treatment and safety. Be sure to make and go to all appointments, and call your doctor if you are having problems. It's also a good idea to know your test results and keep a list of the medicines you take. How can you care for yourself at home? Read ingredient lists on food labels  · Read the list of ingredients on food labels to help you find how much sodium is in a food. The label lists the ingredients in a food in descending order (from the most to the least). If salt or sodium is high on the list, there may be a lot of sodium in the food. · Know that sodium has different names. Sodium is also called monosodium glutamate (MSG, common in [place] food), sodium citrate, sodium alginate, sodium hydroxide, and sodium phosphate. Read Nutrition Facts labels  · On most foods, there is a Nutrition Facts label. This will tell you how much sodium is in one serving of food. Look at both the serving size and the sodium amount. The serving size is located at the top of the label, usually right under the \"Nutrition Facts\" title. The amount of sodium is given in the list under the title. It is given in milligrams (mg). ? Check the serving size carefully. A single serving is often very small, and you may eat more than one serving. If this is the case, you will eat more sodium than listed on the label. For example, if the serving size for a canned soup is 1 cup and the sodium amount is 470 mg, if you have 2 cups you will eat 940 mg of sodium. · The nutrition facts for fresh fruits and vegetables are not listed on the food. They may be listed somewhere in the store. These foods usually have no sodium or low sodium. · The Nutrition Facts label also gives you the Percent Daily Value for sodium. This is how much of the recommended amount of sodium a serving contains. The daily value for sodium is less than 2,300 mg. So if the Percent Daily Value says 50%, this means one serving is giving you half of this, or 1,150 mg. Buy low-sodium foods  · Look for foods that are made with less sodium. Watch for the following words on the label. ? \"Unsalted\" means there is no sodium added to the food. But there may be sodium already in the food naturally. ? \"Sodium-free\" means a serving has less than 5 mg of sodium. ? \"Very low sodium\" means a serving has 35 mg or less of sodium. ? \"Low-sodium\" means a serving has 140 mg or less of sodium. · \"Reduced-sodium\" means that there is 25% less sodium than what the food normally has. This is still usually too much sodium. Try not to buy foods with this on the label. · Buy fresh vegetables, or frozen vegetables without added sauces. Buy low-sodium versions of canned vegetables, soups, and other canned goods. Where can you learn more? Go to https://TutorGrouppeOutsell.[email or web address] org and sign in to your [employer] account. Enter 26 [id number] in the [employer] box to learn more about \"How to Read a Food Label to Limit Sodium: Care Instructions. \"     If you do not have an account, please click on the \"Sign Up Now\" link. Current as of: August 22, 2019               Content Version: 12.6  © 2113-8033 [employer]Twang[employer]. Care instructions adapted under license by [facility] ([facility]). If you have questions about a medical condition or this instruction, always ask your healthcare professional. [address] any warranty or liability for your use of this information.

## 2021-01-28 ENCOUNTER — OFFICE VISIT (OUTPATIENT)
Dept: FAMILY MEDICINE CLINIC | Age: 76
End: 2021-01-28
Payer: MEDICARE

## 2021-01-28 VITALS
DIASTOLIC BLOOD PRESSURE: 72 MMHG | WEIGHT: 216 LBS | TEMPERATURE: 97.5 F | HEART RATE: 64 BPM | BODY MASS INDEX: 36.88 KG/M2 | HEIGHT: 64 IN | SYSTOLIC BLOOD PRESSURE: 126 MMHG

## 2021-01-28 DIAGNOSIS — E78.2 MIXED HYPERLIPIDEMIA: Primary | Chronic | ICD-10-CM

## 2021-01-28 DIAGNOSIS — I10 ESSENTIAL HYPERTENSION: Chronic | ICD-10-CM

## 2021-01-28 PROCEDURE — 1090F PRES/ABSN URINE INCON ASSESS: CPT | Performed by: FAMILY MEDICINE

## 2021-01-28 PROCEDURE — 1123F ACP DISCUSS/DSCN MKR DOCD: CPT | Performed by: FAMILY MEDICINE

## 2021-01-28 PROCEDURE — G8482 FLU IMMUNIZE ORDER/ADMIN: HCPCS | Performed by: FAMILY MEDICINE

## 2021-01-28 PROCEDURE — 1036F TOBACCO NON-USER: CPT | Performed by: FAMILY MEDICINE

## 2021-01-28 PROCEDURE — 3017F COLORECTAL CA SCREEN DOC REV: CPT | Performed by: FAMILY MEDICINE

## 2021-01-28 PROCEDURE — 99213 OFFICE O/P EST LOW 20 MIN: CPT | Performed by: FAMILY MEDICINE

## 2021-01-28 PROCEDURE — G8417 CALC BMI ABV UP PARAM F/U: HCPCS | Performed by: FAMILY MEDICINE

## 2021-01-28 PROCEDURE — 4040F PNEUMOC VAC/ADMIN/RCVD: CPT | Performed by: FAMILY MEDICINE

## 2021-01-28 PROCEDURE — G8399 PT W/DXA RESULTS DOCUMENT: HCPCS | Performed by: FAMILY MEDICINE

## 2021-01-28 PROCEDURE — G8427 DOCREV CUR MEDS BY ELIG CLIN: HCPCS | Performed by: FAMILY MEDICINE

## 2021-01-28 RX ORDER — SULFACETAMIDE SODIUM 100 MG/ML
2 SOLUTION/ DROPS OPHTHALMIC 4 TIMES DAILY
Qty: 1 BOTTLE | Refills: 0 | Status: SHIPPED | OUTPATIENT
Start: 2021-01-28 | End: 2021-02-04

## 2021-01-28 NOTE — PROGRESS NOTES
Zeke Dill (:  1945) is a 77 y.o. female,, here for evaluation of the following chief complaint(s):  Follow-up (hypertension, lipids)      ASSESSMENT/PLAN:   Diagnosis Orders   1. Mixed hyperlipidemia  AST    ALT    Lipid Panel   2. Essential hypertension     blepharitis     Orders Placed This Encounter   Medications    sulfacetamide (BLEPH-10) 10 % ophthalmic solution     Sig: Place 2 drops into both eyes 4 times daily for 7 days     Dispense:  1 Bottle     Refill:  0         Do use the eye drops   Do let me know if not resolved  Get the fasting blood work done   See in 6 months   No follow-ups on file. SUBJECTIVE/OBJECTIVE:  Patient presents with:   Follow-up: hypertension, lipids    She is here for the above  Some itch in the eyes  ~3 weeks  Her  has similar  No cold sx no fever    Fatigue  No cp no sob  Appetite is good  Urine and bm ok no sx   No abdomen pain  No ha no loss of vision    YOB: 1945    Date of Visit:  2021     -- Morphine -- Rash    --  rash   -- Other -- Rash    Current Outpatient Medications:    metoprolol succinate (TOPROL XL) 50 MG extended release tablet, TAKE ONE TABLET BY MOUTH DAILY, Disp: 90 tablet, Rfl: 2    rivaroxaban (XARELTO) 20 MG TABS tablet, TAKE ONE TABLET BY MOUTH DAILY WITH BREAKFAST, Disp: 30 tablet, Rfl: 4    flecainide (TAMBOCOR) 50 MG tablet, Take 50 mg by mouth 2 times daily, Disp: , Rfl:     NIFEdipine (ADALAT CC) 60 MG extended release tablet, Take 1 tablet by mouth daily, Disp: 30 tablet, Rfl: 1    albuterol sulfate HFA (PROAIR HFA) 108 (90 Base) MCG/ACT inhaler, Inhale 2 puffs into the lungs every 4 hours as needed for Wheezing or Shortness of Breath, Disp: 1 Inhaler, Rfl: 11    torsemide (DEMADEX) 20 MG tablet, Take 1 tablet by mouth daily, Disp: 90 tablet, Rfl: 4    rosuvastatin (CRESTOR) 20 MG tablet, Take 1 tablet by mouth daily, Disp: 30 tablet, Rfl: 11   isosorbide mononitrate (IMDUR) 30 MG extended release tablet, TAKE ONE TABLET BY MOUTH DAILY, Disp: 90 tablet, Rfl: 5    albuterol sulfate HFA (PROAIR HFA) 108 (90 Base) MCG/ACT inhaler, Inhale 2 puffs into the lungs every 4 hours as needed for Wheezing or Shortness of Breath, Disp: 1 Inhaler, Rfl: 11    Arformoterol Tartrate (BROVANA) 15 MCG/2ML NEBU, Take 2 mLs by nebulization 2 times daily, Disp: 120 mL, Rfl: 3    hydrALAZINE (APRESOLINE) 50 MG tablet, Take 1 tablet by mouth every 8 hours, Disp: 90 tablet, Rfl: 3    albuterol (PROVENTIL) (2.5 MG/3ML) 0.083% nebulizer solution, Take 3 mLs by nebulization every 4 hours as needed for Wheezing, Disp: 120 mL, Rfl: 5    budesonide (PULMICORT) 0.5 MG/2ML nebulizer suspension, Take 2 mLs by nebulization 2 times daily, Disp: 360 mL, Rfl: 3    formoterol (PERFOROMIST) 20 MCG/2ML nebulizer solution, Take 2 mLs by nebulization 2 times daily, Disp: 120 mL, Rfl: 3    nitroGLYCERIN (NITROSTAT) 0.4 MG SL tablet, Place 1 tablet under the tongue every 5 minutes as needed for Chest pain, Disp: 25 tablet, Rfl: 1    Multiple Vitamins-Minerals (MULTI FOR HER 50+ PO), Take 1 tablet by mouth daily, Disp: , Rfl:     No current facility-administered medications for this visit.       ---------------------------               01/28/21                      1328         ---------------------------   BP:          126/72         Site:    Left Upper Arm     Position:     Sitting        Cuff Size:   Large Adult      Pulse:         64           Temp:   97.5 °F (36.4 °C)   TempSrc:    Temporal        Weight:  216 lb (98 kg)     Height:  5' 4\" (1.626 m)   ---------------------------  Body mass index is 37.08 kg/m².      Wt Readings from Last 3 Encounters:  01/28/21 : 216 lb (98 kg)  01/27/21 : 213 lb (96.6 kg)  01/12/21 : 212 lb (96.2 kg)    BP Readings from Last 3 Encounters:  01/28/21 : 126/72  01/27/21 : 130/70  01/12/21 : (!) 116/57 Review of Systems    Physical Exam  Constitutional:       General: She is not in acute distress. Appearance: Normal appearance. She is well-developed. She is not ill-appearing or diaphoretic. Eyes:      General: No scleral icterus. Right eye: No discharge. Left eye: No discharge. Conjunctiva/sclera:      Right eye: Right conjunctiva is not injected. Left eye: Left conjunctiva is not injected. Comments: Irritated eye lid margins both sides no lesion no d/c no injection   Neck:      Musculoskeletal: Neck supple. Thyroid: No thyroid mass or thyromegaly. Cardiovascular:      Rate and Rhythm: Normal rate and regular rhythm. Heart sounds: Normal heart sounds. No murmur. No friction rub. No gallop. Pulmonary:      Effort: Pulmonary effort is normal. No tachypnea, accessory muscle usage or respiratory distress. Breath sounds: Normal breath sounds. No decreased breath sounds, wheezing, rhonchi or rales. Lymphadenopathy:      Head:      Right side of head: No submental, submandibular, preauricular or posterior auricular adenopathy. Left side of head: No submental, submandibular, preauricular or posterior auricular adenopathy. Cervical: No cervical adenopathy. Upper Body:      Right upper body: No supraclavicular adenopathy. Left upper body: No supraclavicular adenopathy. Skin:     General: Skin is warm and dry. Coloration: Skin is not pale. Neurological:      Mental Status: She is alert. An electronic signature was used to authenticate this note.     --Casa Huertas MD

## 2021-01-29 ENCOUNTER — OFFICE VISIT (OUTPATIENT)
Dept: CARDIOLOGY CLINIC | Age: 76
End: 2021-01-29
Payer: MEDICARE

## 2021-01-29 VITALS
OXYGEN SATURATION: 96 % | WEIGHT: 213 LBS | HEART RATE: 52 BPM | BODY MASS INDEX: 36.37 KG/M2 | TEMPERATURE: 97.7 F | SYSTOLIC BLOOD PRESSURE: 126 MMHG | HEIGHT: 64 IN | DIASTOLIC BLOOD PRESSURE: 74 MMHG

## 2021-01-29 DIAGNOSIS — I50.22 CHRONIC SYSTOLIC CONGESTIVE HEART FAILURE (HCC): Primary | ICD-10-CM

## 2021-01-29 DIAGNOSIS — I10 ESSENTIAL HYPERTENSION: ICD-10-CM

## 2021-01-29 DIAGNOSIS — R06.09 DOE (DYSPNEA ON EXERTION): ICD-10-CM

## 2021-01-29 DIAGNOSIS — I25.10 CORONARY ARTERY DISEASE INVOLVING NATIVE CORONARY ARTERY OF NATIVE HEART WITHOUT ANGINA PECTORIS: ICD-10-CM

## 2021-01-29 PROCEDURE — 1090F PRES/ABSN URINE INCON ASSESS: CPT | Performed by: INTERNAL MEDICINE

## 2021-01-29 PROCEDURE — G8482 FLU IMMUNIZE ORDER/ADMIN: HCPCS | Performed by: INTERNAL MEDICINE

## 2021-01-29 PROCEDURE — 1123F ACP DISCUSS/DSCN MKR DOCD: CPT | Performed by: INTERNAL MEDICINE

## 2021-01-29 PROCEDURE — G8427 DOCREV CUR MEDS BY ELIG CLIN: HCPCS | Performed by: INTERNAL MEDICINE

## 2021-01-29 PROCEDURE — 99214 OFFICE O/P EST MOD 30 MIN: CPT | Performed by: INTERNAL MEDICINE

## 2021-01-29 PROCEDURE — 1036F TOBACCO NON-USER: CPT | Performed by: INTERNAL MEDICINE

## 2021-01-29 PROCEDURE — G8417 CALC BMI ABV UP PARAM F/U: HCPCS | Performed by: INTERNAL MEDICINE

## 2021-01-29 PROCEDURE — G8399 PT W/DXA RESULTS DOCUMENT: HCPCS | Performed by: INTERNAL MEDICINE

## 2021-01-29 PROCEDURE — 3017F COLORECTAL CA SCREEN DOC REV: CPT | Performed by: INTERNAL MEDICINE

## 2021-01-29 PROCEDURE — 4040F PNEUMOC VAC/ADMIN/RCVD: CPT | Performed by: INTERNAL MEDICINE

## 2021-01-29 RX ORDER — TORSEMIDE 20 MG/1
40 TABLET ORAL DAILY
Qty: 180 TABLET | Refills: 3 | Status: SHIPPED
Start: 2021-01-29 | End: 2021-02-11

## 2021-01-29 NOTE — PROGRESS NOTES
[facility]  Office Visit    Haliee Montanez  1945    January 29, 2021    CC: \"I have to have another atrial fibrillation ablation. \"      HPI:  The patient is 77 y.o. female with a past medical history significant for CAD, atrial fib, HTN, aortic aneurysm and COPD who is here for follow up. Admitted 1/7/2018-1/12/2018  With SOB and chest pain and dx with acute on chronic diastolic HF (diuresed), and atrial fib. Troponins elevated, testing pended due to sepsis from the flu. Outpatient stress testing positive and she underwent cardiac cath on 3/2/18 which revealed  of the RCA and nonobstructive CAD of the LAD and LCX. She also underwent endovascular AAA repair on 3/21/18 by Dr. Aimee Nolan. Patient was  in the hospital and had 2/25/20 for atrial fibrillation along with signs and symptoms of heart failure. She converted to sinus rhythm after she was started on amiodarone therapy. Today, she is accompanied by her family. She reports that she saw Dr. Terry French on Wednesday and will be scheduled for a repeat ablation. She does report LI that is chronic. The patient reports medical therapy compliance and tolerating. Patient denies exertional chest pain/pressure, dyspnea at rest, PND, orthopnea, palpitations, heart racing, fluttering, lightheadedness, weight changes, changes in LE edema, and syncope. Review of Systems:  Constitutional: Denies falls, night sweats or fever. Fatigue improved. HEENT: Denies new visual changes, ringing in ears, nosebleeds, nasal congestion  Respiratory: + SOBE (chronic but but stable)  Cardiovascular: see HPI  GI: Denies N/V, diarrhea, constipation, abdominal pain, change in bowel habits, melena or hematochezia  : Denies urinary frequency, urgency, incontinence, hematuria or dysuria. Skin: Denies rash, hives, or cyanosis  Musculoskeletal: Denies joint or muscle aches/pain  Neurological: Denies syncope or TIA-like symptoms.   Psychiatric: Denies anxiety or depression, negative insomnia     Past Medical History:   Diagnosis Date    AAA (abdominal aortic aneurysm) ([facility]     pt states it is 4cm    AAA (abdominal aortic aneurysm) without rupture ([facility] 2/10/2015    Atrial fibrillation (HCC)     CAD (coronary artery disease)     CHF (congestive heart failure) ([facility]     COPD (chronic obstructive pulmonary disease) (HCC)     History of blood clots     Hyperlipidemia     Hypertension      Past Surgical History:   Procedure Laterality Date    ABDOMINAL AORTIC ANEURYSM REPAIR      Endovascular abdominal AA    APPENDECTOMY      incidental    BLADDER SUSPENSION      CATARACT REMOVAL      CHOLECYSTECTOMY  10/15/13    COLONOSCOPY  07    dr Tona Doan and check in 5 years.  COLONOSCOPY  2017    ok dr arndt, repeat 5 years   [address]    for benign tumor.  just the uterus    JOINT REPLACEMENT  2013    right knee replacement    TONSILLECTOMY  as a child    TUMOR EXCISION      benign behind right ear about      Family History   Problem Relation Age of Onset    Heart Disease Father     Hypertension Other      Social History     Tobacco Use    Smoking status: Former Smoker     Packs/day: 1.00     Years: 37.00     Pack years: 37.00     Types: Cigarettes     Start date: 1976     Quit date: 2013     Years since quittin.3    Smokeless tobacco: Never Used    Tobacco comment: E cigarette now and then   Substance Use Topics    Alcohol use: No    Drug use: No       Allergies   Allergen Reactions    Morphine Rash     rash    Other Rash     Current Outpatient Medications   Medication Sig Dispense Refill    sulfacetamide (BLEPH-10) 10 % ophthalmic solution Place 2 drops into both eyes 4 times daily for 7 days 1 Bottle 0    metoprolol succinate (TOPROL XL) 50 MG extended release tablet TAKE ONE TABLET BY MOUTH DAILY 90 tablet 2    rivaroxaban (XARELTO) 20 MG TABS tablet TAKE ONE TABLET BY MOUTH DAILY WITH BREAKFAST 30 tablet 4    flecainide (TAMBOCOR) 50 MG tablet Take 50 mg by mouth 2 times daily      NIFEdipine (ADALAT CC) 60 MG extended release tablet Take 1 tablet by mouth daily 30 tablet 1    torsemide (DEMADEX) 20 MG tablet Take 1 tablet by mouth daily 90 tablet 4    rosuvastatin (CRESTOR) 20 MG tablet Take 1 tablet by mouth daily 30 tablet 11    isosorbide mononitrate (IMDUR) 30 MG extended release tablet TAKE ONE TABLET BY MOUTH DAILY 90 tablet 5    albuterol sulfate HFA (PROAIR HFA) 108 (90 Base) MCG/ACT inhaler Inhale 2 puffs into the lungs every 4 hours as needed for Wheezing or Shortness of Breath 1 Inhaler 11    Arformoterol Tartrate (BROVANA) 15 MCG/2ML NEBU Take 2 mLs by nebulization 2 times daily 120 mL 3    hydrALAZINE (APRESOLINE) 50 MG tablet Take 1 tablet by mouth every 8 hours 90 tablet 3    albuterol (PROVENTIL) (2.5 MG/3ML) 0.083% nebulizer solution Take 3 mLs by nebulization every 4 hours as needed for Wheezing 120 mL 5    budesonide (PULMICORT) 0.5 MG/2ML nebulizer suspension Take 2 mLs by nebulization 2 times daily 360 mL 3    formoterol (PERFOROMIST) 20 MCG/2ML nebulizer solution Take 2 mLs by nebulization 2 times daily 120 mL 3    nitroGLYCERIN (NITROSTAT) 0.4 MG SL tablet Place 1 tablet under the tongue every 5 minutes as needed for Chest pain 25 tablet 1    Multiple Vitamins-Minerals (MULTI FOR HER 50+ PO) Take 1 tablet by mouth daily       No current facility-administered medications for this visit. Physical Exam:   /74   Pulse 52   Temp 97.7 °F (36.5 °C)   Ht 5' 4\" (1.626 m)   Wt 213 lb (96.6 kg) Comment: with shoes  SpO2 96%   BMI 36.56 kg/m²   Wt Readings from Last 2 Encounters:   01/29/21 213 lb (96.6 kg)   01/28/21 216 lb (98 kg)     Physical Exam:   Constitutional: The patient is oriented to person, place, and time. Appears well-developed and well-nourished. In no acute distress. Head: Normocephalic and atraumatic. Pupils equal and round.   Neck: Neck supple. No JVP or carotid bruit appreciated. No mass and no thyromegaly present. No lymphadenopathy present. Cardiovascular: Normal rate and regular rhythm. Normal heart sounds. Exam reveals no gallop and no friction rub. No murmur heard. Pulmonary/Chest: Effort normal and breath sounds normal. No respiratory distress. No wheezes, rhonchi or rales. Abdominal: Soft, non-tender. Bowel sounds are normal. Exhibits no organomegaly, mass or bruit. Extremities: No edema. No cyanosis or clubbing. Pulses are 2+ radial and carotid bilaterally. Neurological: No gross cranial nerve deficit. Coordination normal.   Skin: Skin is warm and dry. There is no rash or diaphoresis. Psychiatric: Patient has a normal mood and affect. Speech is normal and behavior is normal.       Lab Review:   Lab Results   Component Value Date    TRIG 118 2019    HDL 41 2019    HDL 38 2011    LDLCALC 90 2019    LDLDIRECT 111 2012    LABVLDL 24 2019     Lab Results   Component Value Date     2021    K 4.7 2021    K 4.9 2020    CL 98 2021    CO2 28 2021    BUN 27 2021    CREATININE 1.5 2021    GLUCOSE 85 2021    GLUCOSE 102 2016    CALCIUM 9.5 2021      Lab Results   Component Value Date    WBC 12.9 (H) 2021    HGB 11.7 (L) 2021    HCT 37.6 2021    MCV 94.1 2021     2021       EK2018: Sinus rhythm with old anterior infarct  18: Sinus Rhythm, PACs, Old anterior infarct. 19 Sinus Rhythm  20 Sinus  Rhythm  - occasional PAC, # PACs = 1, -Anteroseptal infarct -age undetermined.    -Nonspecific ST depression  -Nondiagnostic. 21 declined     Echo 2018:  Mild concentric left ventricular hypertrophy. Visually estimated ejection fraction of 65%. Mitral annular calcification. Mild left atrial dilation. Mild aortic stenosis.  (mean gradients [id number])  The systolic pulmonary artery pressure (SPAP) estimated at 30 mmHg  (estimated RA pressure of 8 mmHg included). [facility]: ([facility]) 4/2017   of RCA chronic LAD 10-20% RA 4 PA24/9 16 wedge 9 LVEDP markedly elevated 30    Carotid US 10/2017 at [facility]:  1. Estimated diameter reduction of the right internal carotid artery is  less than 50%. 2.  Estimated diameter reduction of the left internal carotid artery is  50-69%. 3.  The bilateral common carotid arteries reveal no evidence of   significant stenosis. 4.  There is greater than 50% stenosis of the right external carotid  artery. 5.  There is no evidence of significant stenosis in the left external  carotid artery. 6.  The bilateral vertebral arteries are patent with antegrade flow. 7.  The bilateral subclavian arteries reveal no evidence of significant  stenosis. 8.  There has been no significant change from the previous study of  4/21/2017.     Lexiscan 2/19/18   Summary    Abnormal study. There is a moderate sized, mild intensity, partially    reversible defect of the mid to apical anterior and mid anterolateral walls    which is consistent with ischemia. There is breast attenuation but stress    images appear worse.    Normal LV size and systolic function.    Overall findings represent an intermediate risk study     Cardiac Cath 3/5/18  OVERALL IMPRESSION  1. Again, 100% proximal right coronary artery occlusion with left to right  collaterals. 2.  Mild disease of the distal left main trunk. 3.  20% to 30% ostial left anterior descending artery stenosis with  collaterals from LAD to the right coronary artery. 4.  30% to 40% stenosis of the proximal circumflex artery. 5.  Normal left ventricular systolic function with estimated EF of 55% to  60%. 6.  Slightly enlarged aortic root with no evidence of aortic stenosis or  regurgitation.     In view of the above findings, we will okay the patient for her upcoming  aortic aneurysm surgery from cardiac standpoint.     ECHO 1/2/20  There is moderate concentric left ventricular hypertrophy. Patient appears to be in atrial fibrillation. Ejection fraction is estimated to be 50-60%. Indeterminate diastolic function. Mild aortic regurgitation. Mild tricuspid regurgitation. Mild mitral regurgitation with mitral annular calcification    Right Heart Cath 2/28/2020  1.  _____ normal right cardiac pressure. 2.  Elevated pulmonary capillary wedge pressure with a mean pulmonary  capillary wedge of 29 mmHg. 3.  Normal cardiac output and indices. 4.  No oxygen step off to suggest ASD/PFO.    In view of the above findings, we will start the patient on a small dose  of diuretic therapy and see whether we need to adjust some of her  antihypertensive medicines or not. Her findings are consistent with a  diastolic heart failure. Assessment/Plan:     Coronary artery disease involving native coronary artery of native heart without angina pectoris  She denies any signs or symptoms of angina today but continues with LI, chronic and has improved with increase in diuretic therapy last week. Continue medical management with BB, statin, Imdur, hydralainze and PRN SL Nitro. She previously reported myalgias with Crestor with resolution of cramping. Chronic diastolic heart failure  She appears compensated on exam but continues with chronic stable LI. She reported an increase in LI last week and called on call physical and was instructed to increase Demadex from 20 mg daily to 40 mg daily with improvements. Denies any SOB at rest. Continue Torsemide 40 mg daily with repeat BMP next week. No aldactone secondary to elevated Cr. Continue B-blocker. Encouraged medication compliance. Last BMP 1/7/21 abnormal but stable. NYHA class II    Essential hypertension  Blood pressure is stable today on medical therapy. Will monitor BMP. Paroxsymal atrial fibrillation   CHADS VASC score 7 for age, gender, DVT, CHF, HTN, CAD. Managed per Dr. Henry Chapman. She had Afib ablation last year but had recurrence of A fib She underwent DCCV 1/12/21 and saw him in follow up 1/27/21, back in atrial fibrillation and will be scheduled for repeat AFIB ablation 2/17/21. She appears to be in a regular rhythm on clinical exam today continue Xarelto, Flecainide and Toprol-XL. AAA (abdominal aortic aneurysm) without rupture     Underwent endovascular AAA repair on 3/21/18 by Dr. Tanesha Clark. Follows vascular surgery    Sleep apnea  Intolerant to CPAP. Follow up in 5 month. Instructed to obtain flu vaccine this season, annually and obtain COVID-19 vaccine once available. Thank you very much for allowing me to participate in the care of your patient. Please do not hesitate to contact me if you have any questions. Sincerely,  Russell Lang MD      [facility], [address] [place] [address]  Ph: [phone]  Fax: [phone]    This note was scribed in the presence of Dr. Nel Moreau MD by Kristin Nelson RN. Physician Attestation:  The scribes documentation has been prepared under my direction and personally reviewed by me in its entirety. I confirm that the note above accurately reflects all work, treatment, procedures, and medical decision making performed by me.

## 2021-02-08 DIAGNOSIS — R06.02 SOB (SHORTNESS OF BREATH): ICD-10-CM

## 2021-02-08 DIAGNOSIS — I50.32 CHRONIC DIASTOLIC HF (HEART FAILURE) (HCC): ICD-10-CM

## 2021-02-08 DIAGNOSIS — E78.2 MIXED HYPERLIPIDEMIA: Chronic | ICD-10-CM

## 2021-02-08 LAB
ALT SERPL-CCNC: 10 U/L (ref 10–40)
ANION GAP SERPL CALCULATED.3IONS-SCNC: 13 MMOL/L (ref 3–16)
AST SERPL-CCNC: 18 U/L (ref 15–37)
BUN BLDV-MCNC: 37 MG/DL (ref 7–20)
CALCIUM SERPL-MCNC: 9.5 MG/DL (ref 8.3–10.6)
CHLORIDE BLD-SCNC: 100 MMOL/L (ref 99–110)
CHOLESTEROL, TOTAL: 134 MG/DL (ref 0–199)
CO2: 27 MMOL/L (ref 21–32)
CREAT SERPL-MCNC: 1.5 MG/DL (ref 0.6–1.2)
GFR AFRICAN AMERICAN: 41
GFR NON-AFRICAN AMERICAN: 34
GLUCOSE BLD-MCNC: 101 MG/DL (ref 70–99)
HDLC SERPL-MCNC: 45 MG/DL (ref 40–60)
LDL CHOLESTEROL CALCULATED: 60 MG/DL
POTASSIUM SERPL-SCNC: 4.3 MMOL/L (ref 3.5–5.1)
PRO-BNP: 2128 PG/ML (ref 0–449)
SODIUM BLD-SCNC: 140 MMOL/L (ref 136–145)
TRIGL SERPL-MCNC: 144 MG/DL (ref 0–150)
VLDLC SERPL CALC-MCNC: 29 MG/DL

## 2021-02-09 DIAGNOSIS — N18.9 CHRONIC KIDNEY DISEASE, UNSPECIFIED CKD STAGE: Primary | ICD-10-CM

## 2021-02-10 DIAGNOSIS — I50.22 CHRONIC SYSTOLIC CONGESTIVE HEART FAILURE (HCC): ICD-10-CM

## 2021-02-10 DIAGNOSIS — R06.09 DOE (DYSPNEA ON EXERTION): ICD-10-CM

## 2021-02-11 LAB
ANION GAP SERPL CALCULATED.3IONS-SCNC: 12 MMOL/L (ref 3–16)
BUN BLDV-MCNC: 25 MG/DL (ref 7–20)
CALCIUM SERPL-MCNC: 9.8 MG/DL (ref 8.3–10.6)
CHLORIDE BLD-SCNC: 98 MMOL/L (ref 99–110)
CO2: 28 MMOL/L (ref 21–32)
CREAT SERPL-MCNC: 1.2 MG/DL (ref 0.6–1.2)
GFR AFRICAN AMERICAN: 53
GFR NON-AFRICAN AMERICAN: 44
GLUCOSE BLD-MCNC: 101 MG/DL (ref 70–99)
POTASSIUM SERPL-SCNC: 4.3 MMOL/L (ref 3.5–5.1)
SODIUM BLD-SCNC: 138 MMOL/L (ref 136–145)

## 2021-02-17 ENCOUNTER — APPOINTMENT (OUTPATIENT)
Dept: MRI IMAGING | Age: 76
DRG: 274 | End: 2021-02-17
Attending: INTERNAL MEDICINE
Payer: MEDICARE

## 2021-02-17 ENCOUNTER — APPOINTMENT (OUTPATIENT)
Dept: CT IMAGING | Age: 76
DRG: 274 | End: 2021-02-17
Attending: INTERNAL MEDICINE
Payer: MEDICARE

## 2021-02-17 ENCOUNTER — ANESTHESIA EVENT (OUTPATIENT)
Dept: CARDIAC CATH/INVASIVE PROCEDURES | Age: 76
End: 2021-02-17

## 2021-02-17 ENCOUNTER — HOSPITAL ENCOUNTER (INPATIENT)
Dept: CARDIAC CATH/INVASIVE PROCEDURES | Age: 76
LOS: 1 days | Discharge: HOME | DRG: 274 | End: 2021-02-18
Attending: INTERNAL MEDICINE | Admitting: INTERNAL MEDICINE
Payer: MEDICARE

## 2021-02-17 ENCOUNTER — ANESTHESIA (OUTPATIENT)
Dept: CARDIAC CATH/INVASIVE PROCEDURES | Age: 76
End: 2021-02-17

## 2021-02-17 VITALS
DIASTOLIC BLOOD PRESSURE: 82 MMHG | OXYGEN SATURATION: 100 % | SYSTOLIC BLOOD PRESSURE: 184 MMHG | RESPIRATION RATE: 1 BRPM | TEMPERATURE: 96.6 F

## 2021-02-17 DIAGNOSIS — I48.0 PAF (PAROXYSMAL ATRIAL FIBRILLATION) (HCC): ICD-10-CM

## 2021-02-17 PROBLEM — I48.91 A-FIB (HCC): Status: ACTIVE | Noted: 2021-02-17

## 2021-02-17 LAB
ABO/RH: NORMAL
ANTIBODY SCREEN: NORMAL
HCT VFR BLD CALC: 37.6 % (ref 36–48)
HEMOGLOBIN: 12.3 G/DL (ref 12–16)
MCH RBC QN AUTO: 29.4 PG (ref 26–34)
MCHC RBC AUTO-ENTMCNC: 32.6 G/DL (ref 31–36)
MCV RBC AUTO: 90 FL (ref 80–100)
PDW BLD-RTO: 15.3 % (ref 12.4–15.4)
PLATELET # BLD: 208 K/UL (ref 135–450)
PMV BLD AUTO: 9.2 FL (ref 5–10.5)
POC ACT LR: 286 SEC
POC ACT LR: 371 SEC
POC ACT LR: 385 SEC
POC ACT LR: 395 SEC
RBC # BLD: 4.17 M/UL (ref 4–5.2)
SARS-COV-2, NAAT: NOT DETECTED
WBC # BLD: 9.2 K/UL (ref 4–11)

## 2021-02-17 PROCEDURE — 93657 TX L/R ATRIAL FIB ADDL: CPT | Performed by: FAMILY MEDICINE

## 2021-02-17 PROCEDURE — 2500000003 HC RX 250 WO HCPCS: Performed by: NURSE ANESTHETIST, CERTIFIED REGISTERED

## 2021-02-17 PROCEDURE — 2709999900 HC NON-CHARGEABLE SUPPLY

## 2021-02-17 PROCEDURE — 6360000002 HC RX W HCPCS: Performed by: INTERNAL MEDICINE

## 2021-02-17 PROCEDURE — 70551 MRI BRAIN STEM W/O DYE: CPT

## 2021-02-17 PROCEDURE — 2100000000 HC CCU R&B

## 2021-02-17 PROCEDURE — 6360000002 HC RX W HCPCS: Performed by: NURSE ANESTHETIST, CERTIFIED REGISTERED

## 2021-02-17 PROCEDURE — 2580000003 HC RX 258

## 2021-02-17 PROCEDURE — C1732 CATH, EP, DIAG/ABL, 3D/VECT: HCPCS

## 2021-02-17 PROCEDURE — 93656 COMPRE EP EVAL ABLTJ ATR FIB: CPT | Performed by: INTERNAL MEDICINE

## 2021-02-17 PROCEDURE — 3700000000 HC ANESTHESIA ATTENDED CARE

## 2021-02-17 PROCEDURE — 2580000003 HC RX 258: Performed by: NURSE ANESTHETIST, CERTIFIED REGISTERED

## 2021-02-17 PROCEDURE — C1759 CATH, INTRA ECHOCARDIOGRAPHY: HCPCS

## 2021-02-17 PROCEDURE — 2500000003 HC RX 250 WO HCPCS

## 2021-02-17 PROCEDURE — 2580000003 HC RX 258: Performed by: INTERNAL MEDICINE

## 2021-02-17 PROCEDURE — 2700000000 HC OXYGEN THERAPY PER DAY

## 2021-02-17 PROCEDURE — 6360000002 HC RX W HCPCS: Performed by: NURSE PRACTITIONER

## 2021-02-17 PROCEDURE — 6370000000 HC RX 637 (ALT 250 FOR IP): Performed by: NURSE ANESTHETIST, CERTIFIED REGISTERED

## 2021-02-17 PROCEDURE — 86850 RBC ANTIBODY SCREEN: CPT

## 2021-02-17 PROCEDURE — 02K83ZZ MAP CONDUCTION MECHANISM, PERCUTANEOUS APPROACH: ICD-10-PCS | Performed by: INTERNAL MEDICINE

## 2021-02-17 PROCEDURE — 93662 INTRACARDIAC ECG (ICE): CPT | Performed by: INTERNAL MEDICINE

## 2021-02-17 PROCEDURE — 86900 BLOOD TYPING SEROLOGIC ABO: CPT

## 2021-02-17 PROCEDURE — 6370000000 HC RX 637 (ALT 250 FOR IP)

## 2021-02-17 PROCEDURE — 93622 COMP EP EVAL L VENTR PAC&REC: CPT | Performed by: INTERNAL MEDICINE

## 2021-02-17 PROCEDURE — 70450 CT HEAD/BRAIN W/O DYE: CPT

## 2021-02-17 PROCEDURE — 6360000002 HC RX W HCPCS

## 2021-02-17 PROCEDURE — 93623 PRGRMD STIMJ&PACG IV RX NFS: CPT | Performed by: FAMILY MEDICINE

## 2021-02-17 PROCEDURE — 3700000001 HC ADD 15 MINUTES (ANESTHESIA)

## 2021-02-17 PROCEDURE — C1730 CATH, EP, 19 OR FEW ELECT: HCPCS

## 2021-02-17 PROCEDURE — 87635 SARS-COV-2 COVID-19 AMP PRB: CPT

## 2021-02-17 PROCEDURE — 85027 COMPLETE CBC AUTOMATED: CPT

## 2021-02-17 PROCEDURE — 7100000000 HC PACU RECOVERY - FIRST 15 MIN

## 2021-02-17 PROCEDURE — 93655 ICAR CATH ABLTJ DSCRT ARRHYT: CPT | Performed by: INTERNAL MEDICINE

## 2021-02-17 PROCEDURE — 93622 COMP EP EVAL L VENTR PAC&REC: CPT | Performed by: FAMILY MEDICINE

## 2021-02-17 PROCEDURE — 93662 INTRACARDIAC ECG (ICE): CPT | Performed by: FAMILY MEDICINE

## 2021-02-17 PROCEDURE — 86901 BLOOD TYPING SEROLOGIC RH(D): CPT

## 2021-02-17 PROCEDURE — 93656 COMPRE EP EVAL ABLTJ ATR FIB: CPT | Performed by: FAMILY MEDICINE

## 2021-02-17 PROCEDURE — C1894 INTRO/SHEATH, NON-LASER: HCPCS

## 2021-02-17 PROCEDURE — 94761 N-INVAS EAR/PLS OXIMETRY MLT: CPT

## 2021-02-17 PROCEDURE — 02583ZZ DESTRUCTION OF CONDUCTION MECHANISM, PERCUTANEOUS APPROACH: ICD-10-PCS | Performed by: INTERNAL MEDICINE

## 2021-02-17 PROCEDURE — 93613 INTRACARDIAC EPHYS 3D MAPG: CPT | Performed by: FAMILY MEDICINE

## 2021-02-17 PROCEDURE — 4A0234Z MEASUREMENT OF CARDIAC ELECTRICAL ACTIVITY, PERCUTANEOUS APPROACH: ICD-10-PCS | Performed by: INTERNAL MEDICINE

## 2021-02-17 PROCEDURE — 93613 INTRACARDIAC EPHYS 3D MAPG: CPT | Performed by: INTERNAL MEDICINE

## 2021-02-17 PROCEDURE — 7100000001 HC PACU RECOVERY - ADDTL 15 MIN

## 2021-02-17 PROCEDURE — 93655 ICAR CATH ABLTJ DSCRT ARRHYT: CPT | Performed by: FAMILY MEDICINE

## 2021-02-17 PROCEDURE — B24BZZ4 ULTRASONOGRAPHY OF HEART WITH AORTA, TRANSESOPHAGEAL: ICD-10-PCS | Performed by: INTERNAL MEDICINE

## 2021-02-17 PROCEDURE — 85347 COAGULATION TIME ACTIVATED: CPT

## 2021-02-17 PROCEDURE — 6370000000 HC RX 637 (ALT 250 FOR IP): Performed by: INTERNAL MEDICINE

## 2021-02-17 PROCEDURE — 93623 PRGRMD STIMJ&PACG IV RX NFS: CPT | Performed by: INTERNAL MEDICINE

## 2021-02-17 RX ORDER — FLECAINIDE ACETATE 100 MG/1
50 TABLET ORAL 2 TIMES DAILY
Status: DISCONTINUED | OUTPATIENT
Start: 2021-02-17 | End: 2021-02-18

## 2021-02-17 RX ORDER — VECURONIUM BROMIDE 1 MG/ML
INJECTION, POWDER, LYOPHILIZED, FOR SOLUTION INTRAVENOUS PRN
Status: DISCONTINUED | OUTPATIENT
Start: 2021-02-17 | End: 2021-02-17 | Stop reason: SDUPTHER

## 2021-02-17 RX ORDER — FENTANYL CITRATE 50 UG/ML
INJECTION, SOLUTION INTRAMUSCULAR; INTRAVENOUS PRN
Status: DISCONTINUED | OUTPATIENT
Start: 2021-02-17 | End: 2021-02-17 | Stop reason: SDUPTHER

## 2021-02-17 RX ORDER — SODIUM CHLORIDE 9 MG/ML
INJECTION, SOLUTION INTRAVENOUS CONTINUOUS
Status: DISCONTINUED | OUTPATIENT
Start: 2021-02-17 | End: 2021-02-17

## 2021-02-17 RX ORDER — ISOSORBIDE MONONITRATE 30 MG/1
30 TABLET, EXTENDED RELEASE ORAL DAILY
Status: DISCONTINUED | OUTPATIENT
Start: 2021-02-17 | End: 2021-02-18 | Stop reason: HOSPADM

## 2021-02-17 RX ORDER — BUDESONIDE 0.5 MG/2ML
500 INHALANT ORAL 2 TIMES DAILY
Status: DISCONTINUED | OUTPATIENT
Start: 2021-02-17 | End: 2021-02-18 | Stop reason: HOSPADM

## 2021-02-17 RX ORDER — LIDOCAINE HYDROCHLORIDE AND EPINEPHRINE BITARTRATE 20; .01 MG/ML; MG/ML
15 INJECTION, SOLUTION SUBCUTANEOUS SEE ADMIN INSTRUCTIONS
Status: DISCONTINUED | OUTPATIENT
Start: 2021-02-17 | End: 2021-02-18 | Stop reason: HOSPADM

## 2021-02-17 RX ORDER — HEPARIN SODIUM 10000 [USP'U]/100ML
INJECTION, SOLUTION INTRAVENOUS CONTINUOUS PRN
Status: DISCONTINUED | OUTPATIENT
Start: 2021-02-17 | End: 2021-02-17 | Stop reason: SDUPTHER

## 2021-02-17 RX ORDER — PROTAMINE SULFATE 10 MG/ML
30 INJECTION, SOLUTION INTRAVENOUS
Status: ACTIVE | OUTPATIENT
Start: 2021-02-17 | End: 2021-02-17

## 2021-02-17 RX ORDER — FENTANYL CITRATE 50 UG/ML
25 INJECTION, SOLUTION INTRAMUSCULAR; INTRAVENOUS EVERY 5 MIN PRN
Status: DISCONTINUED | OUTPATIENT
Start: 2021-02-17 | End: 2021-02-17

## 2021-02-17 RX ORDER — ALBUTEROL SULFATE 90 UG/1
AEROSOL, METERED RESPIRATORY (INHALATION) PRN
Status: DISCONTINUED | OUTPATIENT
Start: 2021-02-17 | End: 2021-02-17 | Stop reason: SDUPTHER

## 2021-02-17 RX ORDER — SODIUM CHLORIDE 0.9 % (FLUSH) 0.9 %
10 SYRINGE (ML) INJECTION EVERY 12 HOURS SCHEDULED
Status: DISCONTINUED | OUTPATIENT
Start: 2021-02-17 | End: 2021-02-18 | Stop reason: HOSPADM

## 2021-02-17 RX ORDER — SODIUM CHLORIDE 9 MG/ML
INJECTION INTRAVENOUS PRN
Status: DISCONTINUED | OUTPATIENT
Start: 2021-02-17 | End: 2021-02-17 | Stop reason: SDUPTHER

## 2021-02-17 RX ORDER — FENTANYL CITRATE 50 UG/ML
50 INJECTION, SOLUTION INTRAMUSCULAR; INTRAVENOUS EVERY 5 MIN PRN
Status: DISCONTINUED | OUTPATIENT
Start: 2021-02-17 | End: 2021-02-17

## 2021-02-17 RX ORDER — MEPERIDINE HYDROCHLORIDE 25 MG/ML
12.5 INJECTION INTRAMUSCULAR; INTRAVENOUS; SUBCUTANEOUS EVERY 5 MIN PRN
Status: DISCONTINUED | OUTPATIENT
Start: 2021-02-17 | End: 2021-02-17

## 2021-02-17 RX ORDER — GLYCOPYRROLATE 0.2 MG/ML
INJECTION INTRAMUSCULAR; INTRAVENOUS PRN
Status: DISCONTINUED | OUTPATIENT
Start: 2021-02-17 | End: 2021-02-17 | Stop reason: SDUPTHER

## 2021-02-17 RX ORDER — ONDANSETRON 2 MG/ML
4 INJECTION INTRAMUSCULAR; INTRAVENOUS
Status: DISCONTINUED | OUTPATIENT
Start: 2021-02-17 | End: 2021-02-17

## 2021-02-17 RX ORDER — DEXAMETHASONE SODIUM PHOSPHATE 4 MG/ML
INJECTION, SOLUTION INTRA-ARTICULAR; INTRALESIONAL; INTRAMUSCULAR; INTRAVENOUS; SOFT TISSUE PRN
Status: DISCONTINUED | OUTPATIENT
Start: 2021-02-17 | End: 2021-02-17 | Stop reason: SDUPTHER

## 2021-02-17 RX ORDER — KETAMINE HCL IN NACL, ISO-OSM 100MG/10ML
SYRINGE (ML) INJECTION PRN
Status: DISCONTINUED | OUTPATIENT
Start: 2021-02-17 | End: 2021-02-17 | Stop reason: SDUPTHER

## 2021-02-17 RX ORDER — SODIUM CHLORIDE 9 MG/ML
INJECTION, SOLUTION INTRAVENOUS CONTINUOUS PRN
Status: DISCONTINUED | OUTPATIENT
Start: 2021-02-17 | End: 2021-02-17 | Stop reason: SDUPTHER

## 2021-02-17 RX ORDER — HEPARIN SODIUM 1000 [USP'U]/ML
INJECTION, SOLUTION INTRAVENOUS; SUBCUTANEOUS PRN
Status: DISCONTINUED | OUTPATIENT
Start: 2021-02-17 | End: 2021-02-17 | Stop reason: SDUPTHER

## 2021-02-17 RX ORDER — METOPROLOL SUCCINATE 50 MG/1
50 TABLET, EXTENDED RELEASE ORAL DAILY
Status: DISCONTINUED | OUTPATIENT
Start: 2021-02-17 | End: 2021-02-17

## 2021-02-17 RX ORDER — METOPROLOL SUCCINATE 50 MG/1
50 TABLET, EXTENDED RELEASE ORAL DAILY
Status: DISCONTINUED | OUTPATIENT
Start: 2021-02-18 | End: 2021-02-18

## 2021-02-17 RX ORDER — NIFEDIPINE 60 MG/1
60 TABLET, EXTENDED RELEASE ORAL DAILY
Status: DISCONTINUED | OUTPATIENT
Start: 2021-02-18 | End: 2021-02-18 | Stop reason: HOSPADM

## 2021-02-17 RX ORDER — ROSUVASTATIN CALCIUM 10 MG/1
20 TABLET, COATED ORAL DAILY
Status: DISCONTINUED | OUTPATIENT
Start: 2021-02-17 | End: 2021-02-18 | Stop reason: HOSPADM

## 2021-02-17 RX ORDER — PROPOFOL 10 MG/ML
INJECTION, EMULSION INTRAVENOUS PRN
Status: DISCONTINUED | OUTPATIENT
Start: 2021-02-17 | End: 2021-02-17 | Stop reason: SDUPTHER

## 2021-02-17 RX ORDER — 0.9 % SODIUM CHLORIDE 0.9 %
1000 INTRAVENOUS SOLUTION INTRAVENOUS
Status: ACTIVE | OUTPATIENT
Start: 2021-02-17 | End: 2021-02-17

## 2021-02-17 RX ORDER — DOBUTAMINE HYDROCHLORIDE 200 MG/100ML
INJECTION INTRAVENOUS CONTINUOUS PRN
Status: DISCONTINUED | OUTPATIENT
Start: 2021-02-17 | End: 2021-02-17 | Stop reason: SDUPTHER

## 2021-02-17 RX ORDER — PROTAMINE SULFATE 10 MG/ML
INJECTION, SOLUTION INTRAVENOUS PRN
Status: DISCONTINUED | OUTPATIENT
Start: 2021-02-17 | End: 2021-02-17 | Stop reason: SDUPTHER

## 2021-02-17 RX ORDER — ACETAMINOPHEN 325 MG/1
650 TABLET ORAL EVERY 4 HOURS PRN
Status: DISCONTINUED | OUTPATIENT
Start: 2021-02-17 | End: 2021-02-18 | Stop reason: HOSPADM

## 2021-02-17 RX ORDER — FUROSEMIDE 10 MG/ML
60 INJECTION INTRAMUSCULAR; INTRAVENOUS ONCE
Status: COMPLETED | OUTPATIENT
Start: 2021-02-17 | End: 2021-02-17

## 2021-02-17 RX ORDER — HYDRALAZINE HYDROCHLORIDE 50 MG/1
50 TABLET, FILM COATED ORAL EVERY 8 HOURS SCHEDULED
Status: DISCONTINUED | OUTPATIENT
Start: 2021-02-17 | End: 2021-02-18 | Stop reason: HOSPADM

## 2021-02-17 RX ORDER — ONDANSETRON 2 MG/ML
INJECTION INTRAMUSCULAR; INTRAVENOUS PRN
Status: DISCONTINUED | OUTPATIENT
Start: 2021-02-17 | End: 2021-02-17 | Stop reason: SDUPTHER

## 2021-02-17 RX ORDER — ALBUTEROL SULFATE 90 UG/1
2 AEROSOL, METERED RESPIRATORY (INHALATION) EVERY 4 HOURS PRN
Status: DISCONTINUED | OUTPATIENT
Start: 2021-02-17 | End: 2021-02-18 | Stop reason: HOSPADM

## 2021-02-17 RX ORDER — SUCCINYLCHOLINE/SOD CL,ISO/PF 200MG/10ML
SYRINGE (ML) INTRAVENOUS PRN
Status: DISCONTINUED | OUTPATIENT
Start: 2021-02-17 | End: 2021-02-17 | Stop reason: SDUPTHER

## 2021-02-17 RX ORDER — METOPROLOL SUCCINATE 25 MG/1
25 TABLET, EXTENDED RELEASE ORAL DAILY
Status: CANCELLED | OUTPATIENT
Start: 2021-02-17

## 2021-02-17 RX ORDER — SODIUM CHLORIDE 0.9 % (FLUSH) 0.9 %
10 SYRINGE (ML) INJECTION PRN
Status: DISCONTINUED | OUTPATIENT
Start: 2021-02-17 | End: 2021-02-18 | Stop reason: HOSPADM

## 2021-02-17 RX ORDER — MIDAZOLAM HYDROCHLORIDE 1 MG/ML
INJECTION INTRAMUSCULAR; INTRAVENOUS PRN
Status: DISCONTINUED | OUTPATIENT
Start: 2021-02-17 | End: 2021-02-17 | Stop reason: SDUPTHER

## 2021-02-17 RX ORDER — LIDOCAINE HYDROCHLORIDE 10 MG/ML
INJECTION, SOLUTION EPIDURAL; INFILTRATION; INTRACAUDAL; PERINEURAL PRN
Status: DISCONTINUED | OUTPATIENT
Start: 2021-02-17 | End: 2021-02-17 | Stop reason: SDUPTHER

## 2021-02-17 RX ORDER — ALBUTEROL SULFATE 2.5 MG/3ML
2.5 SOLUTION RESPIRATORY (INHALATION) EVERY 4 HOURS PRN
Status: DISCONTINUED | OUTPATIENT
Start: 2021-02-17 | End: 2021-02-18 | Stop reason: HOSPADM

## 2021-02-17 RX ADMIN — SODIUM CHLORIDE 2 ML: 9 INJECTION INTRAMUSCULAR; INTRAVENOUS; SUBCUTANEOUS at 15:28

## 2021-02-17 RX ADMIN — HEPARIN SODIUM 5000 UNITS: 1000 INJECTION INTRAVENOUS; SUBCUTANEOUS at 13:52

## 2021-02-17 RX ADMIN — VECURONIUM BROMIDE 3 MG: 1 INJECTION, POWDER, LYOPHILIZED, FOR SOLUTION INTRAVENOUS at 14:10

## 2021-02-17 RX ADMIN — ONDANSETRON 4 MG: 2 INJECTION INTRAMUSCULAR; INTRAVENOUS at 16:20

## 2021-02-17 RX ADMIN — MIDAZOLAM 2 MG: 1 INJECTION INTRAMUSCULAR; INTRAVENOUS at 13:06

## 2021-02-17 RX ADMIN — SODIUM CHLORIDE 3 ML: 9 INJECTION INTRAMUSCULAR; INTRAVENOUS; SUBCUTANEOUS at 14:45

## 2021-02-17 RX ADMIN — Medication 10 ML: at 22:05

## 2021-02-17 RX ADMIN — ISOSORBIDE MONONITRATE 30 MG: 30 TABLET, EXTENDED RELEASE ORAL at 21:00

## 2021-02-17 RX ADMIN — LIDOCAINE HYDROCHLORIDE 50 MG: 10 INJECTION, SOLUTION EPIDURAL; INFILTRATION; INTRACAUDAL; PERINEURAL at 13:13

## 2021-02-17 RX ADMIN — ACETAMINOPHEN 650 MG: 325 TABLET ORAL at 21:02

## 2021-02-17 RX ADMIN — VECURONIUM BROMIDE 2 MG: 1 INJECTION, POWDER, LYOPHILIZED, FOR SOLUTION INTRAVENOUS at 15:28

## 2021-02-17 RX ADMIN — SODIUM CHLORIDE: 9 INJECTION, SOLUTION INTRAVENOUS at 13:03

## 2021-02-17 RX ADMIN — SODIUM CHLORIDE 6 ML: 9 INJECTION INTRAMUSCULAR; INTRAVENOUS; SUBCUTANEOUS at 13:24

## 2021-02-17 RX ADMIN — Medication 20 MG: at 13:12

## 2021-02-17 RX ADMIN — FUROSEMIDE 60 MG: 10 INJECTION, SOLUTION INTRAMUSCULAR; INTRAVENOUS at 20:59

## 2021-02-17 RX ADMIN — Medication 120 MG: at 13:13

## 2021-02-17 RX ADMIN — HYDRALAZINE HYDROCHLORIDE 50 MG: 50 TABLET, FILM COATED ORAL at 22:05

## 2021-02-17 RX ADMIN — FENTANYL CITRATE 25 MCG: 50 INJECTION INTRAMUSCULAR; INTRAVENOUS at 16:21

## 2021-02-17 RX ADMIN — DEXAMETHASONE SODIUM PHOSPHATE 8 MG: 4 INJECTION, SOLUTION INTRAMUSCULAR; INTRAVENOUS at 13:30

## 2021-02-17 RX ADMIN — DOBUTAMINE HYDROCHLORIDE 10 MCG/KG/MIN: 200 INJECTION INTRAVENOUS at 16:13

## 2021-02-17 RX ADMIN — PROTAMINE SULFATE 100 MG: 10 INJECTION, SOLUTION INTRAVENOUS at 16:20

## 2021-02-17 RX ADMIN — Medication 2 PUFF: at 16:30

## 2021-02-17 RX ADMIN — VECURONIUM BROMIDE 3 MG: 1 INJECTION, POWDER, LYOPHILIZED, FOR SOLUTION INTRAVENOUS at 14:45

## 2021-02-17 RX ADMIN — FLECAINIDE ACETATE 50 MG: 100 TABLET ORAL at 21:00

## 2021-02-17 RX ADMIN — SUGAMMADEX 500 MG: 100 INJECTION, SOLUTION INTRAVENOUS at 16:30

## 2021-02-17 RX ADMIN — HEPARIN SODIUM 2000 UNITS: 1000 INJECTION INTRAVENOUS; SUBCUTANEOUS at 14:28

## 2021-02-17 RX ADMIN — Medication 10 MG: at 16:06

## 2021-02-17 RX ADMIN — HEPARIN SODIUM 10000 UNITS/HR: 10000 INJECTION, SOLUTION INTRAVENOUS at 14:28

## 2021-02-17 RX ADMIN — VECURONIUM BROMIDE 6 MG: 1 INJECTION, POWDER, LYOPHILIZED, FOR SOLUTION INTRAVENOUS at 13:24

## 2021-02-17 RX ADMIN — ROSUVASTATIN CALCIUM 20 MG: 10 TABLET, FILM COATED ORAL at 21:02

## 2021-02-17 RX ADMIN — FENTANYL CITRATE 50 MCG: 50 INJECTION INTRAMUSCULAR; INTRAVENOUS at 13:12

## 2021-02-17 RX ADMIN — PROPOFOL 50 MG: 10 INJECTION, EMULSION INTRAVENOUS at 13:13

## 2021-02-17 RX ADMIN — RIVAROXABAN 20 MG: 20 TABLET, FILM COATED ORAL at 19:55

## 2021-02-17 RX ADMIN — Medication 2 PUFF: at 13:04

## 2021-02-17 RX ADMIN — HYDROMORPHONE HYDROCHLORIDE 0.5 MG: 1 INJECTION, SOLUTION INTRAMUSCULAR; INTRAVENOUS; SUBCUTANEOUS at 22:05

## 2021-02-17 RX ADMIN — HEPARIN SODIUM 5000 UNITS: 1000 INJECTION INTRAVENOUS; SUBCUTANEOUS at 14:09

## 2021-02-17 RX ADMIN — SODIUM CHLORIDE 3 ML: 9 INJECTION INTRAMUSCULAR; INTRAVENOUS; SUBCUTANEOUS at 14:10

## 2021-02-17 RX ADMIN — FENTANYL CITRATE 25 MCG: 50 INJECTION INTRAMUSCULAR; INTRAVENOUS at 16:06

## 2021-02-17 RX ADMIN — GLYCOPYRROLATE 0.2 MG: 0.2 INJECTION, SOLUTION INTRAMUSCULAR; INTRAVENOUS at 13:04

## 2021-02-17 ASSESSMENT — PULMONARY FUNCTION TESTS
PIF_VALUE: 26
PIF_VALUE: 25
PIF_VALUE: 25
PIF_VALUE: 26
PIF_VALUE: 27
PIF_VALUE: 25
PIF_VALUE: 26
PIF_VALUE: 4
PIF_VALUE: 1
PIF_VALUE: 25
PIF_VALUE: 26
PIF_VALUE: 37
PIF_VALUE: 1
PIF_VALUE: 26
PIF_VALUE: 25
PIF_VALUE: 1
PIF_VALUE: 27
PIF_VALUE: 24
PIF_VALUE: 25
PIF_VALUE: 26
PIF_VALUE: 25
PIF_VALUE: 26
PIF_VALUE: 25
PIF_VALUE: 26
PIF_VALUE: 27
PIF_VALUE: 25
PIF_VALUE: 26
PIF_VALUE: 26
PIF_VALUE: 25
PIF_VALUE: 27
PIF_VALUE: 26
PIF_VALUE: 25
PIF_VALUE: 26
PIF_VALUE: 25
PIF_VALUE: 1
PIF_VALUE: 25
PIF_VALUE: 26
PIF_VALUE: 25
PIF_VALUE: 25
PIF_VALUE: 26
PIF_VALUE: 26
PIF_VALUE: 25
PIF_VALUE: 25
PIF_VALUE: 0
PIF_VALUE: 25
PIF_VALUE: 26
PIF_VALUE: 25
PIF_VALUE: 1
PIF_VALUE: 27
PIF_VALUE: 25
PIF_VALUE: 25
PIF_VALUE: 26
PIF_VALUE: 27
PIF_VALUE: 1
PIF_VALUE: 23
PIF_VALUE: 26
PIF_VALUE: 26
PIF_VALUE: 25
PIF_VALUE: 43
PIF_VALUE: 26
PIF_VALUE: 24
PIF_VALUE: 26
PIF_VALUE: 35
PIF_VALUE: 27
PIF_VALUE: 3
PIF_VALUE: 26
PIF_VALUE: 1
PIF_VALUE: 26
PIF_VALUE: 26
PIF_VALUE: 27
PIF_VALUE: 25
PIF_VALUE: 28
PIF_VALUE: 25
PIF_VALUE: 25
PIF_VALUE: 26
PIF_VALUE: 5
PIF_VALUE: 41
PIF_VALUE: 25
PIF_VALUE: 25
PIF_VALUE: 27
PIF_VALUE: 26
PIF_VALUE: 25
PIF_VALUE: 26
PIF_VALUE: 27
PIF_VALUE: 25
PIF_VALUE: 13
PIF_VALUE: 25
PIF_VALUE: 26
PIF_VALUE: 25
PIF_VALUE: 25
PIF_VALUE: 0
PIF_VALUE: 15
PIF_VALUE: 26
PIF_VALUE: 25
PIF_VALUE: 25
PIF_VALUE: 26
PIF_VALUE: 37
PIF_VALUE: 25
PIF_VALUE: 26
PIF_VALUE: 37
PIF_VALUE: 26
PIF_VALUE: 25
PIF_VALUE: 25
PIF_VALUE: 0
PIF_VALUE: 27
PIF_VALUE: 26
PIF_VALUE: 1
PIF_VALUE: 0
PIF_VALUE: 25
PIF_VALUE: 26
PIF_VALUE: 1
PIF_VALUE: 25

## 2021-02-17 ASSESSMENT — PAIN DESCRIPTION - ORIENTATION
ORIENTATION: LOWER
ORIENTATION: RIGHT

## 2021-02-17 ASSESSMENT — PAIN SCALES - GENERAL
PAINLEVEL_OUTOF10: 3
PAINLEVEL_OUTOF10: 0

## 2021-02-17 ASSESSMENT — PAIN DESCRIPTION - FREQUENCY
FREQUENCY: INTERMITTENT
FREQUENCY: CONTINUOUS
FREQUENCY: CONTINUOUS

## 2021-02-17 ASSESSMENT — PAIN DESCRIPTION - PAIN TYPE
TYPE: SURGICAL PAIN
TYPE: CHRONIC PAIN

## 2021-02-17 ASSESSMENT — ENCOUNTER SYMPTOMS
SHORTNESS OF BREATH: 1
DYSPNEA ACTIVITY LEVEL: AFTER AMBULATING 1 FLIGHT OF STAIRS

## 2021-02-17 ASSESSMENT — COPD QUESTIONNAIRES: CAT_SEVERITY: SEVERE

## 2021-02-17 ASSESSMENT — PAIN DESCRIPTION - DESCRIPTORS
DESCRIPTORS: ACHING
DESCRIPTORS: ACHING

## 2021-02-17 ASSESSMENT — LIFESTYLE VARIABLES: SMOKING_STATUS: 0

## 2021-02-17 ASSESSMENT — PAIN - FUNCTIONAL ASSESSMENT: PAIN_FUNCTIONAL_ASSESSMENT: 0-10

## 2021-02-17 NOTE — PROGRESS NOTES
Pt awake, alert and oriented. WALLACE times 4. Dr. Bre Lea informed  of need for MRI. To MRI per stretcher.

## 2021-02-17 NOTE — PROGRESS NOTES
Dr Bre Lea remains at bedside and orders stat MRI, Dr Bre Lea takes screening form to  to proceed with Mri, when pt out of CT Scanner  begins to follow commands with equal strength all extremities and at 1734 speaking and oriented, Dr rBe Lea aware.   Handoff to [address] completed and remains in CT will proceed  straight to MRI from CT

## 2021-02-17 NOTE — PROCEDURES
[address]     Electrophysiology Procedure Note       Date of Procedure: 2/17/2021  Patient's Name: Beatrice Chavira  YOB: 1945   Medical Record Number: [id number]  Referring Physician: Keshav Gregory MD  Procedure Performed by:  Trudy Gipson MD    Procedure performed:     · Electrophysiology study with radiofrequency ablation of left atrial flutter and pulmonary vein isolation   · Ablation of roof-dependent left atrial flutter with CL 234ms with proximal-distal activation  · Ablation of anterior-wall dependent left atrial flutter with CL 262ms with proximal-distal activation  · Ablation of appendage-dependent left atrial flutter with CL 260ms with proximal-distal activation  · Ablation of low anterior wall dependent left atrial flutter with CL 330ms with near straight up-down activation  · Additional ablation with creation of a roof line (for roof dependent L flutter), anterior line from mitral annulus to the roof (for anterior wall dependent L flutter), ridge and mitral isthmus (for left atrial appendage dependent L flutter), and low anterior wall ablation (for low anterior wall dependent L flutter)  · 3-D electroanatomical mapping of the left atrium    · Transseptal puncture through an intact septum x 2 under intracardiac ultrasound without fluoroscopic guidance   · Intracardiac echocardiography  · Left ventricular pacing and recording  · Drug infusion with an attempt to induce atrial tachydysrhythmia  · Anesthesia: General anesthesia provided by the Anesthesia service  · (there were no independent trained observers who had no other duties involved in this procedure)      Indications for procedure: Symptomatic atrial flutter, failed antiarrhythmic therapy and cardioversion in the past Elina Mojica is a 77 y.o. female who has a history of paroxysmal atrial fibrillation who is symptomatic with symptoms of dyspnea with minimal exertion and fatigue who has failed antiarrhythmic therapy in the past is now here for an ablation for paroxysmal atrial fibrillation ablation. Of note, she had a previous atrial fibrillation ablation by myself in 1/2020 and thus we could not rule out the presence of left atrial flutter. Details of Procedure: The risks, benefits and alternatives of the ablation procedure were discussed with the patient. The risks including, but not limited to, the risks of bleeding, infection, radiation exposure, injury to vascular, cardiac and surrounding structures (including pneumothorax), stroke, cardiac perforation, tamponade, need for emergent open heart surgery, need for pacemaker implantation, esophageal injury and fistula, myocardial infarction and death were discussed in detail. The patient was also counseled at length about the risks of abhay Covid-19 in the niesha-operative and post-operative states including the recovery window of their procedure. The patient was made aware that abhay Covid-19 after a surgical procedure may worsen their prognosis for recovering from the virus and lend to a higher morbidity and or mortality risk. The patient was given the option of postponing their procedure. The patient opted to proceed with the ablation. Written informed consent was signed and placed in the chart. Patient was brought to the EP lab in a fasting non-sedate state. Patient underwent general anesthesia by anesthesia team. The patient was monitored continuously with ECG, pulse oximetry, blood pressure monitoring, and direct observation. No urinary woodruff was placed in order to prevent any hematuria or urinary tract infection. Both groins were prepped in a sterile fashion. We gained access in the right femoral vein. One 8 German short sheath for ICE and subsequently for CS catheters were placed in the right femoral vein using modified seldinger technique. Two 8.5F SLO sheaths were introduced into the right femoral vein using modified Seldinger technique. Then a CS cathter was placed inside the coronary sinus without fluoroscopy but with 3-D electroanatomic mapping for recording and mapping of the left atrium. Using ICE we delineated the left pulmonary vein, left atrial appendage,  mitral valve, and right superior and right inferior pulmonary veins, and the trivial amount of pericardial effusion prior to ablation. Two transeptal punctures were attempted but we found a patent foramen ovale through which, with blunt perforation using the mapping catheter and performed under intracardiac echocardiogram and pressure monitoring and without the use of fluoroscopy, we entered the left atrium via the PFO. Patient received a bolus of heparin shortly after each transseptal puncture followed by continuous monitoring of the ACT every 15-30 minutes, and additional boluses of heparin during the procedure to keep the ACT between 300-400 sec. We placed both SLO sheaths inside the left atrium. Also an esophageal temperature probe ([employer] Temperature Probe 12Fr) that was tied with sutures to an accompanying [employer] quadripolar 6 German 5-5-5 electrode spacing catheter was advanced into the esophagus for real-time mapping of the esophagus and careful monitoring of the esophageal temperature during ablation. Using the Penta ray cathter and Carto navigation system a three dimensional electro anatomical mapping of the left atrium, in addition to right and left sided pulmonary vein anatomy was created. During sinus rhythm, we found that all four pulmonary veins (left superior, left inferior, right superior, and right inferior) did not have PV fascicles, the triggers for the atrial fibrillation. The antrums of the LSPV, LIPV, and RSPV were also electrically isolated. The anterior antrum of the RIPV (two foci) were not electrically isolated. We also found that the roof was not electrically isolated, and that the anterior line which was previously ablated was also not electrically isolated. With rapid atrial pacing at 230 ms, we were able to easily induce paroxysmal atrial fibrillation which quickly organized into and stabilized into left atrial flutter with tachycardia cycle length 234ms with proximal-distal activation. Then antral ablation for the anterior portion of the RIPV was performed to rid atrial fibrillation. The patient remained in the L atrial flutter (described above -  ms with proximal-distal activation). We then performed a roof ablation. As we did so, the tachycardia changed to a CL of 262ms with proximal-distal activation, thus confirming that the previous left atrial flutter was roof-dependent. This new L atrial flutter was mapped with the earliest activation being on the anterior wall of the LA. We therefore ablated an anterior line from the mitral annulus to the roof. It required at least 2-3 parallel lesions to get an anterior wall block. As we did so, the tachycardia again changed to a CL of 260ms with proximal-distal activation. We again performed an activation map, and we found that this tachycardia was revolving around the left atrial appendage. Given that this structure is very thin in depth, we were careful not to ablate in the interior of the GILA. We ablated around the ridge of the LA, and also the mitral isthmus. As we did so, the tachycardia changed yet again to a cycle length of 330 ms with straight up-down activation. We again performed an activation map and found that the early-meets-late was on the low anterior wall of the LA. We then performed another LA anterior wall ablation from the pre-existing anterior ablated line to the site where the early-meets-late (on the low anterior wall). As we did so, the tachycardia terminated to sinus rhythm. Within a few minutes, the tachycardia returned, and thus we connected the two anterior lines with a connecting line of ablation. As we did so, the tachycardia terminated to sinus rhythm for the remainder of the procedure. After ablation was complete, catheters were placed in the left and right atrium, His-position, right ventricle, and left ventricle for pacing and recording. Arrhythmia was attempted to be induced by rapid atrial and ventricular pacing, and there was no induction of atrial tachydysrhythmia. Maximum output (20mA) pacing in the L antrum and R antrum were also performed and showed dissociation from the rest of the left atrium. This was checked circumferentially around the antrums and verified many times. We evaluated the roof line and found that there was complete, bidirectional block. The mitral isthmus was evaluated and showed incomplete, bidirectional block, as evidenced by a distal-proximal activation. However, when evaluating the anterior line, we found that we had anterior line complete, bidirectional block, which would suffice to rid any mitral-isthmus dependent L flutter. Adenosine bolus of 18mg was also given once to assess for acute re-connection and to attempt to induce atrial fibrillation. We had adequate adenosine effect (AV blocks of > 3 seconds) and there were no pulmonary fascicles seen in any of the veins nor were there any atrial tachydysrhythmia induced. We then administered dobutamine infusion up to 10 mcg/kg/min in order to achieve at least a 20% increase in heart rate from the basal heart rate to induce any atrial tachydysrhythmia, and there were no atrial tachydysrhythmia induced. We then performed an EP study and programmed stimulation using our CS catheter and ablation cathter to assess the cardiac conduction system and to attempt to induce atrial tachydysrhythmia. The ablation catheter were moved from the left atrium to the left ventricular apex and His bundle position. His bundle potentials was recorded and pacing and recordings were performed from right atrium, coronary sinus and LV apex with the following results:     Sinus cycle length was 1595 msec  MO interval was 210 msec  QRS duration was 73 msec  QT interval was 461 msec  AH interval was 110 msec  HV interval was 54 msec  Pacing from left atrium, 1:1 conduction over AV node with (AV wenckebach) was 490  msec  Pacing from left atrium, AV geno ERP was 600/380 msec   Pacing from LV apex, 1:1 retrograde conduction over AV node (VA wenckebach) was attempted but showed VA dissociation. Then both the ablation, Pentarray, and CS catheters were removed from the body, and all 3 sheaths were removed from the right femoral vein with a figure-of-eight suture that was placed to provide hemostasis while awaiting the downtrending of the ACT. Protamine 100mg IV x 1 was administered to partially reverse the IV heparin that was administered during the atrial fibrillation ablation procedure. Under intracardiac ultrasound guidance, we evaluated for pericardail effusion, and there was no evidence of such. Specimen collected: none    Estimated blood loss: < 50 cc    The patient tolerated the procedure well and there were no complications. Patient was extubated and transferred to the floor in stable condition. Conclusion:     - Pre- and post-procedure diagnoses were paroxysmal atrial fibrillation, roof-dependent left atrial flutter with CL 234ms with proximal-distal activation, anterior wall dependent left atrial flutter with CL 262ms with proximal-distal activation, LA appendage dependent left atrial flutter with CL 260ms with proximal-distal activation, and low anterior wall dependent left atrial flutter with CL 330ms with straight up-down activation   - Pulmonary vein isolations using wide area circumferential radiofrequency ablation on the anterior antrum of the RIPV  - Additional ablation with creation of roof line (for roof dependent L flutter), anterior line (for anterior wall dependent L flutter), ridge and mitral isthmus (for LA appendage L flutter), and low anterior line (for low anterior wall dependent L flutter)    Plan:   The patient will be monitored and receive the usual post ablation care. If there are no complications, the patient will be discharged from the hospital either later today or tomorrow with a new prescription for Flecainide 50mg BID, pre-admission Toprol XL 50mg daily, and pre-admission Xarelto 20mg daily. The patient will follow-up with the EP NP in 3 month's time. Thank you for allowing us to participate in the care of your patient. If you have any questions or concerns, please do not hesitate to contact me.     Dale Kumar MD, MS, [facility]  Cardiac Electrophysiology  [address]  [address] [facility], [address] [place] [address]  [address]  [phone]

## 2021-02-17 NOTE — ANESTHESIA PRE PROCEDURE
Department of Anesthesiology  Preprocedure Note       Name:  Migue Fisher   Age:  77 y.o.  :  1945                                          MRN:  [id number]         Date:  2021      Surgeon: * Surgery not found *    Procedure:     Medications prior to admission:   Prior to Admission medications    Medication Sig Start Date End Date Taking?  Authorizing Provider   metoprolol succinate (TOPROL XL) 50 MG extended release tablet TAKE ONE TABLET BY MOUTH DAILY 21   Dejuan Thakkar MD   rivaroxaban (XARELTO) 20 MG TABS tablet TAKE ONE TABLET BY MOUTH DAILY WITH BREAKFAST 21   Olita Null, DO   flecainide (TAMBOCOR) 50 MG tablet Take 50 mg by mouth 2 times daily    Historical Provider, MD   NIFEdipine (ADALAT CC) 60 MG extended release tablet Take 1 tablet by mouth daily 20   Dung Ayala MD   rosuvastatin (CRESTOR) 20 MG tablet Take 1 tablet by mouth daily 20   Dung Ayala MD   isosorbide mononitrate (IMDUR) 30 MG extended release tablet TAKE ONE TABLET BY MOUTH DAILY 20   Dejuan Thakkar MD   albuterol sulfate HFA (PROAIR HFA) 108 (90 Base) MCG/ACT inhaler Inhale 2 puffs into the lungs every 4 hours as needed for Wheezing or Shortness of Breath 20   Olita Null, DO   Arformoterol Tartrate (BROVANA) 15 MCG/2ML NEBU Take 2 mLs by nebulization 2 times daily 20   Isabella Reid MD   hydrALAZINE (APRESOLINE) 50 MG tablet Take 1 tablet by mouth every 8 hours 20   Isabella Reid MD   albuterol (PROVENTIL) (2.5 MG/3ML) 0.083% nebulizer solution Take 3 mLs by nebulization every 4 hours as needed for Wheezing 20   Olita Null, DO   budesonide (PULMICORT) 0.5 MG/2ML nebulizer suspension Take 2 mLs by nebulization 2 times daily 19   Olita Null, DO   formoterol (PERFOROMIST) 20 MCG/2ML nebulizer solution Take 2 mLs by nebulization 2 times daily 19   Olita Null, DO nitroGLYCERIN (NITROSTAT) 0.4 MG SL tablet Place 1 tablet under the tongue every 5 minutes as needed for Chest pain 3/5/19   Kendy Ortiz MD   Multiple Vitamins-Minerals (MULTI FOR HER 50+ PO) Take 1 tablet by mouth daily    Historical Provider, MD       Current medications:    Current Outpatient Medications   Medication Sig Dispense Refill    metoprolol succinate (TOPROL XL) 50 MG extended release tablet TAKE ONE TABLET BY MOUTH DAILY 90 tablet 2    rivaroxaban (XARELTO) 20 MG TABS tablet TAKE ONE TABLET BY MOUTH DAILY WITH BREAKFAST 30 tablet 4    flecainide (TAMBOCOR) 50 MG tablet Take 50 mg by mouth 2 times daily      NIFEdipine (ADALAT CC) 60 MG extended release tablet Take 1 tablet by mouth daily 30 tablet 1    rosuvastatin (CRESTOR) 20 MG tablet Take 1 tablet by mouth daily 30 tablet 11    isosorbide mononitrate (IMDUR) 30 MG extended release tablet TAKE ONE TABLET BY MOUTH DAILY 90 tablet 5    albuterol sulfate HFA (PROAIR HFA) 108 (90 Base) MCG/ACT inhaler Inhale 2 puffs into the lungs every 4 hours as needed for Wheezing or Shortness of Breath 1 Inhaler 11    Arformoterol Tartrate (BROVANA) 15 MCG/2ML NEBU Take 2 mLs by nebulization 2 times daily 120 mL 3    hydrALAZINE (APRESOLINE) 50 MG tablet Take 1 tablet by mouth every 8 hours 90 tablet 3    albuterol (PROVENTIL) (2.5 MG/3ML) 0.083% nebulizer solution Take 3 mLs by nebulization every 4 hours as needed for Wheezing 120 mL 5    budesonide (PULMICORT) 0.5 MG/2ML nebulizer suspension Take 2 mLs by nebulization 2 times daily 360 mL 3    formoterol (PERFOROMIST) 20 MCG/2ML nebulizer solution Take 2 mLs by nebulization 2 times daily 120 mL 3    nitroGLYCERIN (NITROSTAT) 0.4 MG SL tablet Place 1 tablet under the tongue every 5 minutes as needed for Chest pain 25 tablet 1    Multiple Vitamins-Minerals (MULTI FOR HER 50+ PO) Take 1 tablet by mouth daily       No current facility-administered medications for this visit. Facility-Administered Medications Ordered in Other Visits   Medication Dose Route Frequency Provider Last Rate Last Admin    0.9 % sodium chloride infusion   Intravenous Continuous Fay Egan MD        sodium chloride flush 0.9 % injection 10 mL  10 mL Intravenous 2 times per day Fay Egan MD        sodium chloride flush 0.9 % injection 10 mL  10 mL Intravenous PRN Fay Egan MD           Allergies:     Allergies   Allergen Reactions    Morphine Rash     rash    Other Rash       Problem List:    Patient Active Problem List   Diagnosis Code    Essential hypertension I10    Hyperlipidemia E78.5    Coronary artery disease I25.10    Sleep apnea G47.30    History of DVT (deep vein thrombosis) Z86.718    Family history of DVT Z82.49    AAA (abdominal aortic aneurysm) without rupture ([facility]) I71.4    Bilateral pulmonary embolism ([facility]) I26.99    DVT, bilateral lower limbs ([facility]) I82.403    Pleural effusion, left J90    Dyspnea and respiratory abnormality R06.00, R06.89    Pleural effusion J90    PE (pulmonary thromboembolism) ([facility]) I26.99    COPD exacerbation ([facility]) J44.1    Pelvic pain in female R10.2    Vitamin D deficiency E55.9    Other emphysema ([address] J43.8    Acute bronchitis J20.9    Acute respiratory failure with hypoxia ([address] J96.01    Abnormal chest x-ray R93.89    Atypical pneumonia J18.9    Atrial fibrillation with RVR ([facility]) I48.91    Acute on chronic diastolic heart failure ([facility]) I50.33    PVD (peripheral vascular disease) ([facility]) I73.9    Abnormal nuclear stress test R94.39    AAA (abdominal aortic aneurysm) ([facility]) I71.4    PAF (paroxysmal atrial fibrillation) ([facility]) I48.0    Endoleak post (EVAR) endovascular aneurysm repair, 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 J44.9    Hypertensive crisis I16.9    CHF (congestive heart failure) ([facility]) I50.9    Respiratory failure ([facility]) J96.90    Left atrial flutter by electrocardiogram ([facility]) I48.92    Persistent atrial fibrillation ([facility]) I48.19       Past Medical History:        Diagnosis Date    AAA (abdominal aortic aneurysm) ([facility]     pt states it is 4cm    AAA (abdominal aortic aneurysm) without rupture ([facility] 2/10/2015    Atrial fibrillation ([facility])     CAD (coronary artery disease)     CHF (congestive heart failure) ([facility])     COPD (chronic obstructive pulmonary disease) ([facility])     History of blood clots     Hyperlipidemia     Hypertension        Past Surgical History:        Procedure Laterality Date    ABDOMINAL AORTIC ANEURYSM REPAIR      Endovascular abdominal AA    APPENDECTOMY      incidental    BLADDER SUSPENSION      CATARACT REMOVAL      CHOLECYSTECTOMY  10/15/13    COLONOSCOPY  07    dr Tona Doan and check in 5 years.  COLONOSCOPY  2017    ok dr arndt, repeat 5 years   [address]    for benign tumor. just the uterus    JOINT REPLACEMENT  2013    right knee replacement    TONSILLECTOMY  as a child    TUMOR EXCISION      benign behind right ear about        Social History:    Social History     Tobacco Use    Smoking status: Former Smoker     Packs/day: 1.00     Years: 37.00     Pack years: 37.00     Types: Cigarettes     Start date: 1976     Quit date: 2013     Years since quittin.4    Smokeless tobacco: Never Used    Tobacco comment: E cigarette now and then   Substance Use Topics    Alcohol use: No                                Counseling given: Not Answered  Comment: E cigarette now and then      Vital Signs (Current): There were no vitals filed for this visit. BP Readings from Last 3 Encounters:   01/29/21 126/74   01/28/21 126/72   01/27/21 130/70       NPO Status:                                                                                 BMI:   Wt Readings from Last 3 Encounters:   01/29/21 213 lb (96.6 kg)   01/28/21 216 lb (98 kg)   01/27/21 213 lb (96.6 kg)     There is no height or weight on file to calculate BMI.    CBC:   Lab Results   Component Value Date    WBC 9.2 02/17/2021    RBC 4.17 02/17/2021    RBC 4.19 04/13/2017    HGB 12.3 02/17/2021    HCT 37.6 02/17/2021    MCV 90.0 02/17/2021    RDW 15.3 02/17/2021     02/17/2021       CMP:   Lab Results   Component Value Date     02/10/2021    K 4.3 02/10/2021    K 4.9 02/25/2020    CL 98 02/10/2021    CO2 28 02/10/2021    BUN 25 02/10/2021    CREATININE 1.2 02/10/2021    GFRAA 53 02/10/2021    GFRAA >60 06/25/2012    AGRATIO 1.7 09/24/2020    LABGLOM 44 02/10/2021    GLUCOSE 101 02/10/2021    GLUCOSE 102 07/14/2016    PROT 6.7 09/24/2020    PROT 7.9 07/14/2016    CALCIUM 9.8 02/10/2021    BILITOT 0.4 09/24/2020    ALKPHOS 82 09/24/2020    AST 18 02/08/2021    ALT 10 02/08/2021       POC Tests: No results for input(s): POCGLU, POCNA, POCK, POCCL, POCBUN, POCHEMO, POCHCT in the last 72 hours.     Coags:   Lab Results   Component Value Date    PROTIME 26.0 02/15/2020    INR 2.22 02/15/2020    APTT 37.2 02/15/2020       HCG (If Applicable): No results found for: PREGTESTUR, PREGSERUM, HCG, HCGQUANT     ABGs:   Lab Results   Component Value Date    PHART 7.399 06/04/2015    PO2ART 78.5 06/04/2015    XMQ3BCV 42.9 06/04/2015    MNE9MER 26.0 06/04/2015    BEART 1.5 06/04/2015    U0ACFZQV 96.0 06/04/2015        Type & Screen (If Applicable):  No results found for: LABABO, LABRH    Drug/Infectious Status (If Applicable):  No results found for: HIV, HEPCAB    COVID-19 Screening (If Applicable):   Lab Results   Component Value Date    COVID19 NOT DETECTED 10/05/2020 Anesthesia Evaluation  Patient summary reviewed and Nursing notes reviewed no history of anesthetic complications:   Airway: Mallampati: II  TM distance: >3 FB   Neck ROM: full  Mouth opening: > = 3 FB Dental:    (+) upper dentures, lower dentures and edentulous      Pulmonary: breath sounds clear to auscultation  (+) COPD (severe): severe,  shortness of breath: chronic,  sleep apnea:      (-) pneumonia, recent URI and not a current smoker                           Cardiovascular:  Exercise tolerance: poor (<4 METS),   (+) hypertension:, angina:, CAD:, dysrhythmias: atrial fibrillation, CHF:, LI: after ambulating 1 flight of stairs, hyperlipidemia    (-) pacemaker, valvular problems/murmurs, past MI, orthopnea and PND      Rhythm: irregular                      Neuro/Psych:      (-) seizures, neuromuscular disease, TIA, CVA, headaches, psychiatric history and depression/anxiety            GI/Hepatic/Renal:        (-) hiatal hernia, GERD, PUD, hepatitis, liver disease, no renal disease, bowel prep and no morbid obesity      ROS comment: obesity. Endo/Other:    (+) blood dyscrasia (xarelto): anticoagulation therapy, arthritis:., .    (-) diabetes mellitus, hypothyroidism, hyperthyroidism               Abdominal:           Vascular:   + PVD, aortic or cerebral (AAA s/p repair, then with endoleak), DVT, PE. Anesthesia Plan      general     ASA 3       Induction: intravenous. MIPS: Prophylactic antiemetics administered. Anesthetic plan and risks discussed with patient and spouse. Plan discussed with CRNA.                   Gigi Saucedo MD   2/17/2021      This pre-anesthesia assessment may be used as a history and physical.    DOS STAFF ADDENDUM: Pt seen and examined, chart reviewed (including anesthesia, drug and allergy history). No interval changes to history and physical examination. Anesthetic plan, risks, benefits, alternatives, and personnel involved discussed with patient. Patient verbalized an understanding and agrees to proceed.       Kehinde Martin MD  February 17, 2021  11:16 AM

## 2021-02-17 NOTE — PROGRESS NOTES
Pt non verbal, not follow commands, pupils reactive, eyes appear to focus to call of name, pre/post cath lab notified, nurse john to notify md

## 2021-02-17 NOTE — H&P
Cardiac Electrophysiology Consultation   Date: 2/17/2021  Reason for Consultation: Atrial fibrillation  Consult Requesting Physician:  Miguel A Keating MD  Primary Care Physician: Roberto Guzman MD     Chief Complaint:        Chief Complaint   Patient presents with    Follow-up       DCCV - discuss ablation - no cardiac complaints         HPI: Dillon Garcia is a 77 y.o. patient with a history of CAD, atrial fibrillation, hypertension, aortic aneurysm, COPD, PE , DVT,chronic diastolic heart failure. She was seen in office on 9/14/2020 for initial consultation  to discuss treatment options for her atrial fibrillation. She reported that she was symptomatic with shortness of breath and palpitations. She made the decision to undergo ablation for treatment of her atrial fibrillation and on 10/7/2020 she underwent electrophysiology study with radiofrequency ablation of atrial fibrillation and pulmonary vein isolation. She was seen in office by Martina Burciaga CNP on 1/7/2021 for her 3 months post ablation follow up and EKG confirmed atrial fibrillation/atrial flutter rate controlled at 72 bpm. She subsequently underwent cardioversion to restore NSR on 1/12/2021.      Interval History: Today, she presents to office accompanied by her  for follow up s/p cardioversion 1/12/2021. Her first episode of atrial fibrillation occurred roughly 9/2020. EKG today shows NSR. She is compliant with her medications and tolerating them well.  She denies chest pain/pressure, tightness, edema, shortness of breath, heart racing, palpitations, lightheadedness, dizziness, syncope, presyncope,  PND or orthopnea.      Past Medical History        Past Medical History:   Diagnosis Date    AAA (abdominal aortic aneurysm) ([address]       pt states it is 4cm    AAA (abdominal aortic aneurysm) without rupture ([address] 2/10/2015    Atrial fibrillation ([address]      CAD (coronary artery disease)      CHF (congestive heart failure) ([address]    COPD (chronic obstructive pulmonary disease) (HCC)      History of blood clots      Hyperlipidemia      Hypertension              Past Surgical History         Past Surgical History:   Procedure Laterality Date    ABDOMINAL AORTIC ANEURYSM REPAIR         Endovascular abdominal AA    APPENDECTOMY   1990     incidental    BLADDER SUSPENSION        CATARACT REMOVAL        CHOLECYSTECTOMY   10/15/13    COLONOSCOPY   9/2/07     dr Neela Sanchez and check in 5 years.  COLONOSCOPY   06/16/2017     ok dr arndt, repeat 5 years   [address]     for benign tumor. just the uterus    JOINT REPLACEMENT   12/2013     right knee replacement    TONSILLECTOMY   as a child    TUMOR EXCISION         benign behind right ear about 2008            Allergies: Allergies   Allergen Reactions    Morphine Rash       rash    Other Rash         Medication:   Home Medications           Prior to Admission medications    Medication Sig Start Date End Date Taking?  Authorizing Provider   metoprolol succinate (TOPROL XL) 50 MG extended release tablet TAKE ONE TABLET BY MOUTH DAILY 1/11/21     Dejuan Corcoran MD   rivaroxaban (XARELTO) 20 MG TABS tablet TAKE ONE TABLET BY MOUTH DAILY WITH BREAKFAST 1/8/21     Bijan Membreno,    flecainide (TAMBOCOR) 50 MG tablet Take 50 mg by mouth 2 times daily       Historical Provider, MD   NIFEdipine (ADALAT CC) 60 MG extended release tablet Take 1 tablet by mouth daily 12/29/20     Myriam Perea MD   albuterol sulfate HFA (PROAIR HFA) 108 (90 Base) MCG/ACT inhaler Inhale 2 puffs into the lungs every 4 hours as needed for Wheezing or Shortness of Breath 11/2/20     Bijan Membreno DO   torsemide (DEMADEX) 20 MG tablet Take 1 tablet by mouth daily 10/16/20     Clau Perez MD   rosuvastatin (CRESTOR) 20 MG tablet Take 1 tablet by mouth daily 8/13/20     Myriam Perea MD isosorbide mononitrate (IMDUR) 30 MG extended release tablet TAKE ONE TABLET BY MOUTH DAILY 7/27/20     Dejuan Michaels MD   albuterol sulfate HFA (PROAIR HFA) 108 (90 Base) MCG/ACT inhaler Inhale 2 puffs into the lungs every 4 hours as needed for Wheezing or Shortness of Breath 7/23/20     Vern Bryan DO   Arformoterol Tartrate (BROVANA) 15 MCG/2ML NEBU Take 2 mLs by nebulization 2 times daily 2/29/20     Tanya Yeh MD   hydrALAZINE (APRESOLINE) 50 MG tablet Take 1 tablet by mouth every 8 hours 2/29/20     Tanya Yeh MD   albuterol (PROVENTIL) (2.5 MG/3ML) 0.083% nebulizer solution Take 3 mLs by nebulization every 4 hours as needed for Wheezing 2/17/20     Vern Bryan DO   budesonide (PULMICORT) 0.5 MG/2ML nebulizer suspension Take 2 mLs by nebulization 2 times daily 7/24/19     Vern Bryan DO   formoterol (PERFOROMIST) 20 MCG/2ML nebulizer solution Take 2 mLs by nebulization 2 times daily 7/24/19     Vern Bryan DO   nitroGLYCERIN (NITROSTAT) 0.4 MG SL tablet Place 1 tablet under the tongue every 5 minutes as needed for Chest pain 3/5/19     Kendy Ortiz MD   Multiple Vitamins-Minerals (MULTI FOR HER 50+ PO) Take 1 tablet by mouth daily       Historical Provider, MD            Social History:   reports that she quit smoking about 7 years ago. Her smoking use included cigarettes. She started smoking about 44 years ago. She has a 37.00 pack-year smoking history. She has never used smokeless tobacco. She reports that she does not drink alcohol or use drugs.         Family History:  family history includes Heart Disease in her father; Hypertension in an other family member. Reviewed.  Denies family history of sudden cardiac death, arrhythmia, premature CAD     Review of System:     · General ROS: negative for - chills, fever   · Psychological ROS: negative for - anxiety or depression  · Ophthalmic ROS: negative for - eye pain or loss of vision · ENT ROS: negative for - epistaxis, headaches, nasal discharge, sore throat   · Allergy and Immunology ROS: negative for - hives, nasal congestion   · Hematological and Lymphatic ROS: negative for - bleeding problems, blood clots, bruising or jaundice  · Endocrine ROS: negative for - skin changes, temperature intolerance or unexpected weight changes  · Respiratory ROS: negative for - cough, hemoptysis, pleuritic pain, sputum changes or wheezing. + SOB  · Cardiovascular ROS: Per HPI. · Gastrointestinal ROS: negative for - abdominal pain, blood in stools, diarrhea, hematemesis, melena, nausea/vomiting or swallowing difficulty/pain  · Genito-Urinary ROS: negative for - dysuria or incontinence  · Musculoskeletal ROS: negative for - joint swelling or muscle pain  · Neurological ROS: negative for - confusion, dizziness, gait disturbance, headaches, numbness/tingling, seizures, speech problems, tremors, visual changes or weakness  · Dermatological ROS: negative for - rash     Physical Examination:      Vitals:     01/27/21 0926   BP: 130/70   Pulse: 62   Temp: 96.4 °F (35.8 °C)   SpO2: 92%         · Constitutional: Oriented. No distress. · Head: Normocephalic and atraumatic. · Mouth/Throat: Oropharynx is clear and moist.   · Eyes: Conjunctivae normal. EOM are normal.   · Neck: Normal range of motion. Neck supple. No rigidity. No JVD present. · Cardiovascular: Normal rate, regular rhythm, S1&S2 and intact distal pulses. · Pulmonary/Chest: Bilateral respiratory sounds. No wheezes. No rhonchi. · Abdominal: Soft. Bowel sounds present. No distension, No tenderness. · Musculoskeletal: No tenderness. No edema    · Lymphadenopathy: Has no cervical adenopathy. · Neurological: Alert and oriented. Cranial nerve appears intact, No Gross deficit   · Skin: Skin is warm and dry. No rash noted.    · Psychiatric: Has a normal mood, affect and behavior      Labs:  Reviewed.     3.  Normal cardiac output and indices. 4.  No oxygen step off to suggest ASD/PFO.     6. Carotid US 10/2017 at [facility]:  1. Estimated diameter reduction of the right internal carotid artery is  less than 50%. 2.  Estimated diameter reduction of the left internal carotid artery is  50-69%. 3.  The bilateral common carotid arteries reveal no evidence of significant stenosis. 4.  There is greater than 50% stenosis of the right external carotid  artery. 5.  There is no evidence of significant stenosis in the left external  carotid artery. 6.  The bilateral vertebral arteries are patent with antegrade flow. 7.  The bilateral subclavian arteries reveal no evidence of significant  stenosis. 8.  There has been no significant change from the previous study of  4/21/2017.     The MCOT, echocardiogram, stress test, and coronary angiography/PCI were reviewed by myself and used for my plan of care. Procedures:  1. Endovascular AAA repair on 3/21/2018 by Dr. Desmond Savage  2. Electrophysiology study with radiofrequency ablation of atrial fibrillation and pulmonary vein isolation 10/7/2020.   3. Successful external DC cardioversion of symptomatic, persistent atrial fibrillation 1/12/2021.     Assessment/Plan:      Atrial fibrillation  EKG today SB  She has a [id number] Score 3 (HTN, AGE, female gender)  On Xarelto 20 mg daily for  thromboembolic risk reduction. Tolerating well no signs or symptoms of abnormal bruising or bleeding. S/p radiofrequency ablation of atrial fibrillation on 10/7/2020.   At her three months post ablation follow up appointment EKG confirmed atrial fibrillation/atrial flutter rate controlled at 72 bpm. She subsequently underwent cardioversion to restored NSR on 1/12/2021.     We educated the patient that atrial fibrillation is a worsening and progressive disease, with more frequent episodes that will ensue. Subsequent episodes usually become more sustained to the extent that many individuals would then develop persistent atrial fibrillation. Once persistence is reached, permanent atrial fibrillation is inevitable. We also discussed the fact that atrial fibrillation is associated with stroke, including life-threatening stroke, and therefore oral anticoagulation is warranted depending on the patient's [id number] score.      We discussed different management options for atrial fibrillation including their risks and benefits.  These options include use of cardioversion (mainly for persisting atrial fibrillation or atrial flutter) which provides an effective immediate therapy with success rates of 75% or higher, but it provides no short nor long term efficacy.  Anti-arrhythmic medications provide a very effective short term therapy, but even with our most potent anti-arrhythmic medication there is limited long term efficacy (clinical studies have shown that 40% of patients remain atrial fibrillation-free after 4 years of follow-up after starting one of the more powerful anti-arrhythmic medication (amiodarone), and, if extrapolated, may have further diminishing success as time goes on). Atrial fibrillation ablation is a potentially curative therapy with very reasonable success rate after a first time procedure and with improving success rates with subsequent procedures.     She follows with Dr. Rohan Barrett for management. Much time was spent counseling the patient on healthier lifestyle, following a low sodium diet <2000 mg of sodium a day and dramatically decreasing the intake of processed sugar.     Instructed to stay well hydrated as dehydration can cause and or exacerbate her symtoms 10-12 (8 oz glasses)  of water daily. Increasing intake on hot days 12-15 (8 oz glasses) of water daily.     Coronary artery disease involving native coronary artery of native heart without angina pectoris  Denies angina. Continue with medical management and risk factor modification including statin, and beta blocker.      Hypertension  Stable.     AAA (abdominal aortic aneurysm) without rupture   Underwent endovascular AAA repair on 3/21/18 by Dr. Nellie Melchor.  Follows vascular surgery.     Chronic obstructive pulmonary disease   Chronic shortness of breath. Centrilobular emphysema noted on CT scan. Managed by Dr. Laurel Denton MD.     Follow up three months after ablation with Keila Pinedo CNP. Continue routine follow up with Dr. Rohan Barrett.     Thank you for allowing me to participate in the care of Dimas Luque. All questions and concerns were addressed to the patient/family. Alternatives to my treatment were discussed.      I have reviewed the history and physical and examined the patient and find no relevant changes.  I have reviewed with the patient and/or family the risks, benefits, and alternatives to the procedure.     Tyson Trevizo MD, [address], [facility], [address] [place] [address], [address] [place], [place] [address] [address]  [phone]

## 2021-02-17 NOTE — PROGRESS NOTES
Does moan and move rt leg to touch of groin site, pupils remain active, non verbal, follows with eyes, Dr Soy Aguila, crna's remain at bedside

## 2021-02-18 VITALS
SYSTOLIC BLOOD PRESSURE: 128 MMHG | RESPIRATION RATE: 13 BRPM | HEIGHT: 64 IN | OXYGEN SATURATION: 97 % | BODY MASS INDEX: 35 KG/M2 | TEMPERATURE: 97.4 F | WEIGHT: 205.03 LBS | DIASTOLIC BLOOD PRESSURE: 105 MMHG | HEART RATE: 55 BPM

## 2021-02-18 LAB
POC ACT LR: >400 SEC
POC ACT LR: >400 SEC

## 2021-02-18 PROCEDURE — 99239 HOSP IP/OBS DSCHRG MGMT >30: CPT | Performed by: NURSE PRACTITIONER

## 2021-02-18 PROCEDURE — 94761 N-INVAS EAR/PLS OXIMETRY MLT: CPT

## 2021-02-18 PROCEDURE — 93005 ELECTROCARDIOGRAM TRACING: CPT | Performed by: INTERNAL MEDICINE

## 2021-02-18 PROCEDURE — 6370000000 HC RX 637 (ALT 250 FOR IP): Performed by: INTERNAL MEDICINE

## 2021-02-18 RX ORDER — FLECAINIDE ACETATE 50 MG/1
25 TABLET ORAL 2 TIMES DAILY
Qty: 60 TABLET | Refills: 3 | Status: SHIPPED
Start: 2021-02-18 | End: 2021-05-03

## 2021-02-18 RX ORDER — FLECAINIDE ACETATE 50 MG/1
25 TABLET ORAL 2 TIMES DAILY
Status: DISCONTINUED | OUTPATIENT
Start: 2021-02-18 | End: 2021-02-18 | Stop reason: HOSPADM

## 2021-02-18 RX ORDER — METOPROLOL SUCCINATE 25 MG/1
12.5 TABLET, EXTENDED RELEASE ORAL DAILY
Status: DISCONTINUED | OUTPATIENT
Start: 2021-02-18 | End: 2021-02-18 | Stop reason: HOSPADM

## 2021-02-18 RX ORDER — METOPROLOL SUCCINATE 25 MG/1
12.5 TABLET, EXTENDED RELEASE ORAL DAILY
Qty: 15 TABLET | Refills: 3 | Status: ON HOLD | OUTPATIENT
Start: 2021-02-18 | End: 2021-07-05

## 2021-02-18 RX ADMIN — ROSUVASTATIN CALCIUM 20 MG: 10 TABLET, FILM COATED ORAL at 09:26

## 2021-02-18 RX ADMIN — ACETAMINOPHEN 650 MG: 325 TABLET ORAL at 09:32

## 2021-02-18 RX ADMIN — FLECAINIDE ACETATE 25 MG: 50 TABLET ORAL at 10:00

## 2021-02-18 RX ADMIN — RIVAROXABAN 20 MG: 20 TABLET, FILM COATED ORAL at 10:50

## 2021-02-18 RX ADMIN — HYDRALAZINE HYDROCHLORIDE 50 MG: 50 TABLET, FILM COATED ORAL at 06:00

## 2021-02-18 RX ADMIN — METOPROLOL SUCCINATE 12.5 MG: 25 TABLET, EXTENDED RELEASE ORAL at 09:26

## 2021-02-18 RX ADMIN — ISOSORBIDE MONONITRATE 30 MG: 30 TABLET, EXTENDED RELEASE ORAL at 09:25

## 2021-02-18 RX ADMIN — NIFEDIPINE 60 MG: 60 TABLET, FILM COATED, EXTENDED RELEASE ORAL at 09:25

## 2021-02-18 NOTE — PROGRESS NOTES
Late Entry:  @ 0800 Bedside handoff with Valerie Deshpande RN. Pt awake/alert assisted up to recliner chair with standby assist. Right groin site assessed with bedside handoff. Dressing changed. @ 0813 Assessment completed. @ Dede 67 CNP at bedside for cardiology rounds. Status reviewed, questions answered, plan of care discussed.  arrived to bedside during rounds. Discharge order noted. @ 0932 Scheduled medications administered with exception of Flecainide, waiting for pharmacy to deliver. Pt c/o right groin site discomfort with movement, pt medicated with PRN dose Tylenol, see eMAR. @ 1035 Left PIV removed, site [facility]  @ [address] delivered new presriptions. Discharge instructions reviewed with patient and family member. Patient and family verbalized understanding. All home medications have been reviewed, questions answered and patient voiced understanding. All medication side effects reviewed and patient and family verbalized understanding. Patient given prescriptions, discharge instructions, and appointment times. Removed CVU monitor. Pt getting dressed at this time. @ [address]  Patient discharged to home with family via private car. Taken to lobby via wheelchair.

## 2021-02-18 NOTE — DISCHARGE SUMMARY
DISCHARGE SUMMARY  Patient ID:  Lissy Rivera  [id number]  14 y.o.  1945    Admit date: 2/17/2021    Discharge date: 2/18/2021    Admitting Physician: Genoveva Cisse MD     Discharge Provider: CHRISTINE Solano     Admission Diagnoses: A-fib [facility]) [I48.91]    Discharge Diagnoses: Same    Discharged Condition: Good    Hospital Course: Lissy Rivera was admitted on 2/17/2021 and underwent EP study with RFA of left atrial flutter with pulmonary vein isolation, roof line ablation (for L flutter), anterior wall (L flutter), appendage ablation (for L flutter) and low anterior wall ablation (for L flutter) on 2/17/21 with Dr. Bre Lea. Following the procedure she had difficultly speaking, head CT and brain MRI were non-acute and she was admitted overnight. She was noted to have a small groin hematoma overnight with manual pressure held multiple times. She has remained in sinus bradycardia overnight and her flecainide was decreased to 25mg BID. Post-ablation medications and activity limitations were discussed.      Procedures:  2/17/21  · Electrophysiology study with radiofrequency ablation of left atrial flutter and pulmonary vein isolation   · Ablation of roof-dependent left atrial flutter with CL 234ms with proximal-distal activation  · Ablation of anterior-wall dependent left atrial flutter with CL 262ms with proximal-distal activation  · Ablation of appendage-dependent left atrial flutter with CL 260ms with proximal-distal activation  · Ablation of low anterior wall dependent left atrial flutter with CL 330ms with near straight up-down activation  · Additional ablation with creation of a roof line (for roof dependent L flutter), anterior line from mitral annulus to the roof (for anterior wall dependent L flutter), ridge and mitral isthmus (for left atrial appendage dependent L flutter), and low anterior wall ablation (for low anterior wall dependent L flutter) · 3-D electroanatomical mapping of the left atrium    · Transseptal puncture through an intact septum x 2 under intracardiac ultrasound without fluoroscopic guidance   · Intracardiac echocardiography  · Left ventricular pacing and recording  · Drug infusion with an attempt to induce atrial tachydysrhythmia  · Anesthesia: General anesthesia provided by the Anesthesia service    Labs:   Lab Results   Component Value Date    CREATININE 1.2 02/10/2021    BUN 25 (H) 02/10/2021     02/10/2021    K 4.3 02/10/2021    CL 98 (L) 02/10/2021    CO2 28 02/10/2021      Lab Results   Component Value Date    WBC 9.2 02/17/2021    HGB 12.3 02/17/2021    HCT 37.6 02/17/2021    MCV 90.0 02/17/2021     02/17/2021      Lab Results   Component Value Date    INR 2.22 (H) 02/15/2020    PROTIME 26.0 (H) 02/15/2020    No results found for: BNP    Please refer to today's progress note for additional information.     Disposition: home    Patient Instructions:   Current Discharge Medication List      CONTINUE these medications which have CHANGED    Details   flecainide (TAMBOCOR) 50 MG tablet Take 0.5 tablets by mouth 2 times daily  Qty: 60 tablet, Refills: 3      metoprolol succinate (TOPROL XL) 25 MG extended release tablet Take 0.5 tablets by mouth daily  Qty: 15 tablet, Refills: 3         CONTINUE these medications which have NOT CHANGED    Details   rivaroxaban (XARELTO) 20 MG TABS tablet TAKE ONE TABLET BY MOUTH DAILY WITH BREAKFAST  Qty: 30 tablet, Refills: 4      NIFEdipine (ADALAT CC) 60 MG extended release tablet Take 1 tablet by mouth daily  Qty: 30 tablet, Refills: 1      rosuvastatin (CRESTOR) 20 MG tablet Take 1 tablet by mouth daily  Qty: 30 tablet, Refills: 11      isosorbide mononitrate (IMDUR) 30 MG extended release tablet TAKE ONE TABLET BY MOUTH DAILY  Qty: 90 tablet, Refills: 5      hydrALAZINE (APRESOLINE) 50 MG tablet Take 1 tablet by mouth every 8 hours  Qty: 90 tablet, Refills: 3 Multiple Vitamins-Minerals (MULTI FOR HER 50+ PO) Take 1 tablet by mouth daily      albuterol sulfate HFA (PROAIR HFA) 108 (90 Base) MCG/ACT inhaler Inhale 2 puffs into the lungs every 4 hours as needed for Wheezing or Shortness of Breath  Qty: 1 Inhaler, Refills: 11      Arformoterol Tartrate (BROVANA) 15 MCG/2ML NEBU Take 2 mLs by nebulization 2 times daily  Qty: 120 mL, Refills: 3      albuterol (PROVENTIL) (2.5 MG/3ML) 0.083% nebulizer solution Take 3 mLs by nebulization every 4 hours as needed for Wheezing  Qty: 120 mL, Refills: 5    Associated Diagnoses: COPD, severe (HCC)      budesonide (PULMICORT) 0.5 MG/2ML nebulizer suspension Take 2 mLs by nebulization 2 times daily  Qty: 360 mL, Refills: 3    Associated Diagnoses: COPD, severe (HCC)      formoterol (PERFOROMIST) 20 MCG/2ML nebulizer solution Take 2 mLs by nebulization 2 times daily  Qty: 120 mL, Refills: 3    Associated Diagnoses: COPD, severe (HCC)      nitroGLYCERIN (NITROSTAT) 0.4 MG SL tablet Place 1 tablet under the tongue every 5 minutes as needed for Chest pain  Qty: 25 tablet, Refills: 1             Post ablation instructions given  Activity: as tolerated  Diet: cardiac diet    Follow-up with CHRISTINE Corona in 3 months. Time spent on discharge of patient: 35 minutes, including plan of care, patient education, and care coordination.     CHRISTINE Corona  [facility] [address], [address] [facility]  Phone: [phone]  Fax: [phone]

## 2021-02-18 NOTE — PROGRESS NOTES
Cardiac Electrophysiology Progress Note     Admit Date: 2021     Reason for follow up: A fib s/p ablation    HPI and Interval History:   She is a 77 y.o. female with a past medical history of persistent atrial fibrillation, AAA, CAD, HTN, COPD, PE, DVT and diastolic HF. She underwent EP study with radiofrequency ablation of atrial fibrillation and pulmonary vein isolation, additional ablation with creation of a roof line (for L flutter) and anterior line from mitral annulus to the roof (for L flutter) on 10/7/20 with Dr. Harpal Kerr. She had recurrent A fib when seen in the office for follow up in 2021. She underwent successful DC cardioversion on 21. She then underwent EP study with RFA of left atrial flutter with pulmonary vein isolation, roof line ablation (for L flutter), anterior wall (L flutter), appendage ablation (for L flutter) and low anterior wall ablation (for L flutter) on 21 with Dr. Harpal Kerr. Following the procedure she had difficultly speaking, head CT and brain MRI were non-acute and she was admitted overnight. She was noted to have a small groin hematoma overnight with manual pressure held multiple times. Groin is sore this morning and she is complaining of back pain which is chronic. No chest pain or dyspnea. No lightheadedness. Physical Examination:  Vitals:    21 0813   BP: (!) 139/54   Pulse: 51   Resp: 24   Temp: 97.4 °F (36.3 °C)   SpO2: 97%        Intake/Output Summary (Last 24 hours) at 2021 0823  Last data filed at 2021 0600  Gross per 24 hour   Intake 970 ml   Output 625 ml   Net 345 ml     In: 970 [P.O.:420;  I.V.:550]  Out: 625    Wt Readings from Last 3 Encounters:   21 205 lb 0.4 oz (93 kg)   21 213 lb (96.6 kg)   21 216 lb (98 kg)     Temp  Av.9 °F (36.1 °C)  Min: 96.3 °F (35.7 °C)  Max: 98.1 °F (36.7 °C)  Pulse  Av.8  Min: 42  Max: 67  BP  Min: 102/59  Max: 212/98  SpO2  Av.9 %  Min: 92 %  Max: 100 % FiO2   Av.4 %  Min: 21 %  Max: 98 %    · Telemetry: SB in the 50s. · Constitutional: Alert, in no acute distress. Appears stated age. · Head: Normocephalic and atraumatic. · Eyes: Conjunctivae normal. EOM are normal.   · Neck: Neck supple. No lymphadenopathy. No rigidity. No JVD present. · Cardiovascular: Normal rate, regular rhythm. No murmurs, rubs or gallops. No S3 or S4.  · Pulmonary/Chest: Clear breath sounds bilaterally. No crackles, wheezes or rhonchi. No respiratory accessory muscle use. · Abdominal: Soft. Normal bowel sounds present. No distension, No tenderness. · Musculoskeletal: No tenderness. No edema    · Lymphadenopathy: Has no cervical adenopathy. · Neurological: Alert and oriented. No gross deficits. · Skin: Small hematoma to right groin without any active drainage. Skin is warm and dry. No rash, lesions, ulcerations noted. · Psychiatric: No anxiety or agitation. Labs, diagnostic and imaging results reviewed. Reviewed. No results for input(s): NA, K, CL, CO2, PHOS, BUN, CREATININE in the last 72 hours. Invalid input(s): CA  Recent Labs     21  1102   WBC 9.2   HGB 12.3   HCT 37.6   MCV 90.0        Lab Results   Component Value Date    TROPONINI <0.01 2020     Estimated Creatinine Clearance: 45 mL/min (based on SCr of 1.2 mg/dL).    No results found for: BNP  Lab Results   Component Value Date    PROTIME 26.0 02/15/2020    PROTIME 11.3 2018    PROTIME 11.7 2018    INR 2.22 02/15/2020    INR 1.00 2018    INR 1.04 2018     Lab Results   Component Value Date    CHOL 134 2021    HDL 45 2021    HDL 38 2011    TRIG 144 2021       Scheduled Meds:   flecainide  25 mg Oral BID    metoprolol succinate  12.5 mg Oral Daily    sodium chloride flush  10 mL Intravenous 2 times per day    budesonide  500 mcg Nebulization BID    hydrALAZINE  50 mg Oral 3 times per day    isosorbide mononitrate  30 mg Oral Daily  Overall findings represent an intermediate risk study.            Procedures:  1. PVI, roof line (for L flutter), anterior line ablation (for L flutter) 10/7/20, Dr. Sheila Burton  2. Successful DCCV for persistent A fib, 1/12/21, Dr. Sheila Burton  3.  PVI for L flutter, roof line (for L flutter), anterior wall (L flutter), appendage ablation (for L flutter) and low anterior wall ablation (for L flutter), 2/17/2, Dr. Sheila Burton.      Assessment and Plan:      Persistent atrial fibrillation             - s/p PVI ablation on 10/7/20 with Dr. Sheila Burton, had recurrent A fib noted in January 2021 and had successful DCCV later that month     - s/p PVI again in February 2021              - Rishi Rey 148 5 (age, gender, HTN, HF, CAD) on Xarelto 20mg QD   - continue flecainide 25mg BID (due to bradycardia) and Toprol 12.5mg QD   - does have hematoma to R groin, pt was advised to monitor and call the office if she notices it increasing in size   - activity restrictions discussed    - can be discharged later today if groin remains stable after ambulation     Left atrial flutter            - seen on EP study s/p roof line and anterior line ablations 10/7/20   - repeat ablation detailed above on 2/17/21   - see above     Chronic diastolic heart failure             - EF 60%             - appears well compensated     CAD              - stable              - on statin, beta blocker     AAA              - s/p repair in 2018 by Dr. Dixie Armenta HTN                - BP controlled            CHRISTINE Gregorio  [facility] [address]  Phone: [phone]  Fax: [phone]    Electronically signed by CHRISTINE Barney - CNP on 2/18/2021 at 8:23 AM

## 2021-02-18 NOTE — PROGRESS NOTES
Late Entry    @4479 Bedside handoff was done with CABRERA Cabrera. R groin site was assessed and was soft, no bruising, no new drainage, but very tender. @2020 Assessment was completed. R groin site no change from before, still no bruising, no new drainage, and soft. Pt states that it seems to hurt a little more than before. @2105 R groin site was assessed and had some changes. No new drainage, no bruising, but now was hard and more tender than before. I held pressure on the hematoma for 10 minutes. The hematoma seemed to improve, but was not completely gone. The entire time I was holding pressure, the pt was in agony. She stated that it was taking everything she had to not scream and start crying. @5394 went back to check on the hematoma and it was back to the same size as before. At this time a call was placed to cardiology for updates. @6976 Radha Sams from cardiology called back and the updates of the hematoma was given. New orders for pain meds was taken and read back for clarification. @0080 Another call was placed to Cardiology because the medicine that was ordered she was allergic to. @5905. Radha Sams called back again, and another order was taken, see Orders. @2205 Pain meds was given see MAR.  @2220 Pressure again was held, pt stated that it was still uncomfortable, but she could tolerate it for a little while. @2240 Pt stated that she could not take the pain anymore. Pressure was stopped. The hematoma was smaller than when I pressure was started. @2320 Pressure was again applied to the hematoma. Pt agreed to let me hold pressure for 10 min.   @0015 Pressure was held again for 15 min  @0107 Pressure was held for another 10 min  @0200 The figure 8 stitch in the R groin was taken out. A Dry dressing was applied. @4383 Pressure was held for another 15 min. Also there was a little drainage on the R groin dressing.

## 2021-02-18 NOTE — CARE COORDINATION
DISCHARGE PLAN: Pt plans to return home with her spouse upon d/c. Spouse to transport home. Denied any d/c needs. ___________________________________    Met w/pt and pts spouse to address barriers to dc. HOME: Pt resides in a mobile home with her spouse. There are 0 DEIDRE.     DME/O2: Pt reported that she has a walker, cane, shower chair, and grab bars in the shower at home.      ACTIVE SERVICES: Pt reported that she does not have any active services at home. Pt stated that she was independent with all self care PTA.      TRANSPORTATION: Pt reported that she relies on her spouse for transportation but she is able to still drive. Pt stated that her spouse will transport pt home.     PHARMACY: denies difficulty obtaining/taking meds. Pt stated that she has her medications filled at [facility] in [place].      PCP: Afia Roldan     DEMOGRAPHICS: verified address/phone number as correct     INSURANCE:  Medicare/[employer]     HD/PD: No     THERAPY RECS  Not ordered     Discharge planning team will remain available for needs.  Please consult for any specifics not addressed in this note.     Aria Camarillo, SEB  [phone]  Electronically signed by Russell Moy on 2/18/2021 at 10:40 AM

## 2021-02-19 ENCOUNTER — TELEPHONE (OUTPATIENT)
Dept: CARDIOLOGY CLINIC | Age: 76
End: 2021-02-19

## 2021-02-19 LAB
EKG ATRIAL RATE: 49 BPM
EKG DIAGNOSIS: NORMAL
EKG P AXIS: 56 DEGREES
EKG P-R INTERVAL: 214 MS
EKG Q-T INTERVAL: 484 MS
EKG QRS DURATION: 84 MS
EKG QTC CALCULATION (BAZETT): 437 MS
EKG R AXIS: -14 DEGREES
EKG T AXIS: 77 DEGREES
EKG VENTRICULAR RATE: 49 BPM

## 2021-02-19 PROCEDURE — 93010 ELECTROCARDIOGRAM REPORT: CPT | Performed by: INTERNAL MEDICINE

## 2021-02-19 NOTE — PROGRESS NOTES
CLINICAL PHARMACY NOTE: MEDS TO [address] Select Patient?: No  Total # of Prescriptions Filled: 1   The following medications were delivered to the patient:  Discharge Medication List as of 2/18/2021 10:36 AM      · metoprolol succinate 25mg  ·   Total # of Interventions Completed: 0  Time Spent (min): 30    Additional Documentation:

## 2021-02-19 NOTE — TELEPHONE ENCOUNTER
Spoke with patient regarding ablation. She has not clinical concerns regarding the procedure and feels well following. She states Dr. Brett Hebert contacted her at home yesterday following discharge. She was upset and voiced concerns regarding the way she was spoke to by Dr. Brett Hebert and that she would not return to our office should this happen again. Encouraged her to call the office and speak with an RN if she does experience any symptoms related to her procedure and that I would share her concerns to the appropriate staff. She v/u.

## 2021-02-19 NOTE — TELEPHONE ENCOUNTER
Anni Bridges called in this afternoon and has some questions about her procedure that was just performed on 2/17.     She can be reached back at [phone]

## 2021-02-22 ENCOUNTER — TELEPHONE (OUTPATIENT)
Dept: FAMILY MEDICINE CLINIC | Age: 76
End: 2021-02-22

## 2021-02-22 NOTE — ANESTHESIA POSTPROCEDURE EVALUATION
Department of Anesthesiology  Postprocedure Note    Patient: Dinh Houtson  MRN: [id number]  YOB: 1945  Date of evaluation: 2/22/2021  Time:  11:01 AM     Procedure Summary     Date: 02/17/21 Room / Location: [facility] Cath Lab    Anesthesia Start: 7246 Anesthesia Stop: 7259    Procedure: ECHOCARDIOGRAM TRANSESOPHAGEAL Diagnosis:       PAF (paroxysmal atrial fibrillation) ([address]      A-fib ([address]      (prior to afib ablation)    Scheduled Providers:  Responsible Provider: Buzz Galeas MD    Anesthesia Type: general ASA Status: 3          Anesthesia Type: general    Mode Phase I:      Mode Phase II:      Last vitals: Reviewed and per EMR flowsheets.        Anesthesia Post Evaluation    Patient location during evaluation: PACU  Patient participation: complete - patient participated  Level of consciousness: awake  Pain score: 0  Airway patency: patent  Nausea & Vomiting: no nausea and no vomiting  Complications: no  Cardiovascular status: blood pressure returned to baseline  Respiratory status: acceptable  Hydration status: euvolemic

## 2021-02-22 NOTE — TELEPHONE ENCOUNTER
Pt had surgery on the 17th and she has a hematoma on her leg. Pt is scheduled for the covid vaccine for this Thursday and she is calling to see if that is ok for her to get? Pl advise.    [phone]

## 2021-02-25 ENCOUNTER — IMMUNIZATION (OUTPATIENT)
Dept: PRIMARY CARE CLINIC | Age: 76
End: 2021-02-25
Payer: MEDICARE

## 2021-02-25 PROCEDURE — 0001A COVID-19, PFIZER VACCINE 30MCG/0.3ML DOSE: CPT | Performed by: FAMILY MEDICINE

## 2021-02-25 PROCEDURE — 91300 COVID-19, PFIZER VACCINE 30MCG/0.3ML DOSE: CPT | Performed by: FAMILY MEDICINE

## 2021-03-02 RX ORDER — NIFEDIPINE 60 MG/1
TABLET, FILM COATED, EXTENDED RELEASE ORAL
Qty: 90 TABLET | Refills: 3 | Status: SHIPPED | OUTPATIENT
Start: 2021-03-02 | End: 2022-03-21

## 2021-03-08 ENCOUNTER — OFFICE VISIT (OUTPATIENT)
Dept: PULMONOLOGY | Age: 76
End: 2021-03-08
Payer: MEDICARE

## 2021-03-08 VITALS
SYSTOLIC BLOOD PRESSURE: 132 MMHG | WEIGHT: 216 LBS | HEART RATE: 59 BPM | RESPIRATION RATE: 16 BRPM | TEMPERATURE: 96.9 F | HEIGHT: 64 IN | BODY MASS INDEX: 36.88 KG/M2 | OXYGEN SATURATION: 94 % | DIASTOLIC BLOOD PRESSURE: 88 MMHG

## 2021-03-08 DIAGNOSIS — J44.9 COPD, SEVERE (HCC): Primary | ICD-10-CM

## 2021-03-08 DIAGNOSIS — R06.02 SOB (SHORTNESS OF BREATH): ICD-10-CM

## 2021-03-08 PROCEDURE — 1111F DSCHRG MED/CURRENT MED MERGE: CPT | Performed by: INTERNAL MEDICINE

## 2021-03-08 PROCEDURE — 3023F SPIROM DOC REV: CPT | Performed by: INTERNAL MEDICINE

## 2021-03-08 PROCEDURE — G8482 FLU IMMUNIZE ORDER/ADMIN: HCPCS | Performed by: INTERNAL MEDICINE

## 2021-03-08 PROCEDURE — G8399 PT W/DXA RESULTS DOCUMENT: HCPCS | Performed by: INTERNAL MEDICINE

## 2021-03-08 PROCEDURE — G8417 CALC BMI ABV UP PARAM F/U: HCPCS | Performed by: INTERNAL MEDICINE

## 2021-03-08 PROCEDURE — 4040F PNEUMOC VAC/ADMIN/RCVD: CPT | Performed by: INTERNAL MEDICINE

## 2021-03-08 PROCEDURE — 1036F TOBACCO NON-USER: CPT | Performed by: INTERNAL MEDICINE

## 2021-03-08 PROCEDURE — 1090F PRES/ABSN URINE INCON ASSESS: CPT | Performed by: INTERNAL MEDICINE

## 2021-03-08 PROCEDURE — G8427 DOCREV CUR MEDS BY ELIG CLIN: HCPCS | Performed by: INTERNAL MEDICINE

## 2021-03-08 PROCEDURE — 99213 OFFICE O/P EST LOW 20 MIN: CPT | Performed by: INTERNAL MEDICINE

## 2021-03-08 PROCEDURE — G8926 SPIRO NO PERF OR DOC: HCPCS | Performed by: INTERNAL MEDICINE

## 2021-03-08 PROCEDURE — 3017F COLORECTAL CA SCREEN DOC REV: CPT | Performed by: INTERNAL MEDICINE

## 2021-03-08 PROCEDURE — 1123F ACP DISCUSS/DSCN MKR DOCD: CPT | Performed by: INTERNAL MEDICINE

## 2021-03-08 ASSESSMENT — COPD QUESTIONNAIRES
QUESTION3_CHESTTIGHTNESS: 0
CAT_TOTALSCORE: 19
QUESTION7_SLEEPQUALITY: 4
QUESTION2_CHESTPHLEGM: 2

## 2021-03-08 NOTE — PROGRESS NOTES
Chief complaint  This is a 77y.o. year old female  who comes to see me with a chief complaint of   Chief Complaint   Patient presents with    COPD       HPI  Here with CC of COPD    Has another ablation back in feb. Says she will not have another if this does not work. Still SOB with exertion but better than last time. Not sick. Did get her two covid vaccines. Past Medical History:   Diagnosis Date    AAA (abdominal aortic aneurysm) [facility]     pt states it is 4cm    AAA (abdominal aortic aneurysm) without rupture (HCC) 2/10/2015    Atrial fibrillation (HCC)     CAD (coronary artery disease)     CHF (congestive heart failure) ([address]     COPD (chronic obstructive pulmonary disease) (HCC)     History of blood clots     Hyperlipidemia     Hypertension        Past Surgical History:   Procedure Laterality Date    ABDOMINAL AORTIC ANEURYSM REPAIR      Endovascular abdominal AA    APPENDECTOMY  1990    incidental    BLADDER SUSPENSION      CATARACT REMOVAL      CHOLECYSTECTOMY  10/15/13    COLONOSCOPY  9/2/07    dr Tona Doan and check in 5 years.  COLONOSCOPY  06/16/2017    ok dr arndt, repeat 5 years   [address]    for benign tumor.  just the uterus    JOINT REPLACEMENT  12/2013    right knee replacement    TONSILLECTOMY  as a child    TUMOR EXCISION      benign behind right ear about 2008     Current Outpatient Medications   Medication Sig Dispense Refill    NIFEdipine (ADALAT CC) 60 MG extended release tablet TAKE ONE TABLET BY MOUTH DAILY 90 tablet 3    flecainide (TAMBOCOR) 50 MG tablet Take 0.5 tablets by mouth 2 times daily 60 tablet 3    metoprolol succinate (TOPROL XL) 25 MG extended release tablet Take 0.5 tablets by mouth daily 15 tablet 3    rivaroxaban (XARELTO) 20 MG TABS tablet TAKE ONE TABLET BY MOUTH DAILY WITH BREAKFAST 30 tablet 4    rosuvastatin (CRESTOR) 20 MG tablet Take 1 tablet by mouth daily 30 tablet 11    isosorbide mononitrate (IMDUR) 30 MG extended release tablet TAKE ONE TABLET BY MOUTH DAILY 90 tablet 5    albuterol sulfate HFA (PROAIR HFA) 108 (90 Base) MCG/ACT inhaler Inhale 2 puffs into the lungs every 4 hours as needed for Wheezing or Shortness of Breath 1 Inhaler 11    Arformoterol Tartrate (BROVANA) 15 MCG/2ML NEBU Take 2 mLs by nebulization 2 times daily 120 mL 3    hydrALAZINE (APRESOLINE) 50 MG tablet Take 1 tablet by mouth every 8 hours 90 tablet 3    albuterol (PROVENTIL) (2.5 MG/3ML) 0.083% nebulizer solution Take 3 mLs by nebulization every 4 hours as needed for Wheezing 120 mL 5    budesonide (PULMICORT) 0.5 MG/2ML nebulizer suspension Take 2 mLs by nebulization 2 times daily 360 mL 3    formoterol (PERFOROMIST) 20 MCG/2ML nebulizer solution Take 2 mLs by nebulization 2 times daily 120 mL 3    nitroGLYCERIN (NITROSTAT) 0.4 MG SL tablet Place 1 tablet under the tongue every 5 minutes as needed for Chest pain 25 tablet 1    Multiple Vitamins-Minerals (MULTI FOR HER 50+ PO) Take 1 tablet by mouth daily       No current facility-administered medications for this visit. PHYSICAL EXAM:  Vitals:    03/08/21 1101   BP: 132/88   Pulse: 59   Resp: 16   Temp: 96.9 °F (36.1 °C)   SpO2: 94%       PE  GENERAL:  Well nourished, alert  HEENT:  No scleral icterus, no conjunctival irritation  NECK:  No thyromegaly, no bruits  LYMPH:  No cervical or supraclavicular adenopathy  HEART:  Regular rate and rhythm, no murmurs. Distant heart sounds   LUNGS:  Diminished   ABDOMEN:  No distention, no organomegaly  EXTREMITIES:  +  edema, no digital clubbing  NEURO:  No localizing deficits, CN II-XII intact    Pulmonary Function Testing 7/2015  Severe obstruction with no response to bronchodilators    Moderate Restriction    Moderate Reduction in diffusing capacity     Chest imaging from 2/2020 is reviewed. My impression is no obvious abnormality     ECHO 1/2020  There is moderate concentric left ventricular hypertrophy.    Patient appears to be in atrial fibrillation. Ejection fraction is estimated to be 50-60%. Indeterminate diastolic function. Mild aortic regurgitation. Mild tricuspid regurgitation. Mild mitral regurgitation with mitral annular calcification    6 MWT 9/2020  The patient ambulated 122 meters which is abnormal- 34-%   predicted.  Her, heart rate response was normal, the blood   pressure response was normal, oxygen saturations were normal.     The patient desaturated to 93% while ambulating on room air.     The degree of symptoms based on the Danielle Dyspnea/Fatigue scale   were increased with testing.  This test does not indicate the   need for supplemental oxygen. Lab Results   Component Value Date    WBC 9.2 02/17/2021    HGB 12.3 02/17/2021    HCT 37.6 02/17/2021    MCV 90.0 02/17/2021     02/17/2021       No results found for: BNP    Lab Results   Component Value Date    CREATININE 1.2 02/10/2021    BUN 25 (H) 02/10/2021     02/10/2021    K 4.3 02/10/2021    CL 98 (L) 02/10/2021    CO2 28 02/10/2021     I reviewed the above labs and images      Assessment/Plan:  1. COPD, severe ([address]  I suspect a fair amount of deconditioning. I offered rehab but she politely declined. Continue with nebs. Try to exercise on her own    2. SOB (shortness of breath)  I suspect most of this is deconditioning and weight (BMI 37). She said she is trying to diet. Needs to keep up with everything.         Flu shot and COVID up to date     Pulmonary Rehab:  [facility] declined    Lung Cancer Screening CT:  9/2020    Follow up in 4 months

## 2021-03-18 ENCOUNTER — IMMUNIZATION (OUTPATIENT)
Dept: PRIMARY CARE CLINIC | Age: 76
End: 2021-03-18
Payer: MEDICARE

## 2021-03-18 PROCEDURE — 0002A COVID-19, PFIZER VACCINE 30MCG/0.3ML DOSE: CPT | Performed by: NURSE PRACTITIONER

## 2021-03-18 PROCEDURE — 91300 COVID-19, PFIZER VACCINE 30MCG/0.3ML DOSE: CPT | Performed by: NURSE PRACTITIONER

## 2021-04-05 ENCOUNTER — TELEPHONE (OUTPATIENT)
Dept: FAMILY MEDICINE CLINIC | Age: 76
End: 2021-04-05

## 2021-04-05 RX ORDER — AMOXICILLIN AND CLAVULANATE POTASSIUM 875; 125 MG/1; MG/1
1 TABLET, FILM COATED ORAL 2 TIMES DAILY
Qty: 20 TABLET | Refills: 0 | Status: SHIPPED | OUTPATIENT
Start: 2021-04-05 | End: 2021-04-15

## 2021-04-05 NOTE — TELEPHONE ENCOUNTER
[phone] Jourdan Bridges advised and verbalized understanding. She states started 3 days ago. Doris Guzman.

## 2021-04-05 NOTE — TELEPHONE ENCOUNTER
Patient calling she has congestion, no fever, wheezing & sob at times due to the copd. Coughing up lots of mucus. No available appts today. She is asking for antibiotic to be called in.

## 2021-05-07 ENCOUNTER — OFFICE VISIT (OUTPATIENT)
Dept: FAMILY MEDICINE CLINIC | Age: 76
End: 2021-05-07
Payer: MEDICARE

## 2021-05-07 VITALS
BODY MASS INDEX: 37.22 KG/M2 | DIASTOLIC BLOOD PRESSURE: 72 MMHG | HEIGHT: 64 IN | WEIGHT: 218 LBS | TEMPERATURE: 99 F | HEART RATE: 68 BPM | SYSTOLIC BLOOD PRESSURE: 124 MMHG

## 2021-05-07 DIAGNOSIS — G47.00 INSOMNIA, UNSPECIFIED TYPE: Primary | ICD-10-CM

## 2021-05-07 PROCEDURE — 99213 OFFICE O/P EST LOW 20 MIN: CPT | Performed by: FAMILY MEDICINE

## 2021-05-07 PROCEDURE — 1123F ACP DISCUSS/DSCN MKR DOCD: CPT | Performed by: FAMILY MEDICINE

## 2021-05-07 PROCEDURE — G8417 CALC BMI ABV UP PARAM F/U: HCPCS | Performed by: FAMILY MEDICINE

## 2021-05-07 PROCEDURE — 3017F COLORECTAL CA SCREEN DOC REV: CPT | Performed by: FAMILY MEDICINE

## 2021-05-07 PROCEDURE — 4040F PNEUMOC VAC/ADMIN/RCVD: CPT | Performed by: FAMILY MEDICINE

## 2021-05-07 PROCEDURE — G8399 PT W/DXA RESULTS DOCUMENT: HCPCS | Performed by: FAMILY MEDICINE

## 2021-05-07 PROCEDURE — G8427 DOCREV CUR MEDS BY ELIG CLIN: HCPCS | Performed by: FAMILY MEDICINE

## 2021-05-07 PROCEDURE — 1090F PRES/ABSN URINE INCON ASSESS: CPT | Performed by: FAMILY MEDICINE

## 2021-05-07 PROCEDURE — 1036F TOBACCO NON-USER: CPT | Performed by: FAMILY MEDICINE

## 2021-05-07 RX ORDER — TRAZODONE HYDROCHLORIDE 50 MG/1
25 TABLET ORAL NIGHTLY PRN
Qty: 30 TABLET | Refills: 0 | Status: SHIPPED | OUTPATIENT
Start: 2021-05-07 | End: 2021-07-09 | Stop reason: SDUPTHER

## 2021-05-07 NOTE — PROGRESS NOTES
Subjective:      Patient ID: Aris Do is a 77 y.o. female. Chief Complaint   Patient presents with    Insomnia        Patient presents with:   Insomnia    She is having pbs with going to sleep for 6 months   She falls asleep and does not stay to sleep  No caffeine  She is having some stress  Her granddaughter was in a mva     She did try otc med for sleep but did not seem to help    YOB: 1945    Date of Visit:  5/7/2021     -- Morphine -- Rash    --  rash   -- Other -- Rash    Current Outpatient Medications:  flecainide (TAMBOCOR) 50 MG tablet, Take 0.5 tablets by mouth 2 times daily, Disp: 30 tablet, Rfl: 2  NIFEdipine (ADALAT CC) 60 MG extended release tablet, TAKE ONE TABLET BY MOUTH DAILY, Disp: 90 tablet, Rfl: 3  metoprolol succinate (TOPROL XL) 25 MG extended release tablet, Take 0.5 tablets by mouth daily, Disp: 15 tablet, Rfl: 3  rivaroxaban (XARELTO) 20 MG TABS tablet, TAKE ONE TABLET BY MOUTH DAILY WITH BREAKFAST, Disp: 30 tablet, Rfl: 4  rosuvastatin (CRESTOR) 20 MG tablet, Take 1 tablet by mouth daily, Disp: 30 tablet, Rfl: 11  isosorbide mononitrate (IMDUR) 30 MG extended release tablet, TAKE ONE TABLET BY MOUTH DAILY, Disp: 90 tablet, Rfl: 5  albuterol sulfate HFA (PROAIR HFA) 108 (90 Base) MCG/ACT inhaler, Inhale 2 puffs into the lungs every 4 hours as needed for Wheezing or Shortness of Breath, Disp: 1 Inhaler, Rfl: 11  Arformoterol Tartrate (BROVANA) 15 MCG/2ML NEBU, Take 2 mLs by nebulization 2 times daily, Disp: 120 mL, Rfl: 3  hydrALAZINE (APRESOLINE) 50 MG tablet, Take 1 tablet by mouth every 8 hours, Disp: 90 tablet, Rfl: 3  albuterol (PROVENTIL) (2.5 MG/3ML) 0.083% nebulizer solution, Take 3 mLs by nebulization every 4 hours as needed for Wheezing, Disp: 120 mL, Rfl: 5  budesonide (PULMICORT) 0.5 MG/2ML nebulizer suspension, Take 2 mLs by nebulization 2 times daily, Disp: 360 mL, Rfl: 3  formoterol (PERFOROMIST) 20 MCG/2ML nebulizer solution, Take 2 mLs by nebulization 2 times daily, Disp: 120 mL, Rfl: 3  nitroGLYCERIN (NITROSTAT) 0.4 MG SL tablet, Place 1 tablet under the tongue every 5 minutes as needed for Chest pain, Disp: 25 tablet, Rfl: 1  Multiple Vitamins-Minerals (MULTI FOR HER 50+ PO), Take 1 tablet by mouth daily, Disp: , Rfl:     No current facility-administered medications for this visit.       --------------------------               05/07/21                     1404        --------------------------   BP:          124/72        Site:    Left Upper Arm    Position:    Sitting        Cuff Size:  Large Adult      Pulse:         68          Temp:   99 °F (37.2 °C)    TempSrc:    Temporal       Weight: 218 lb (98.9 kg)   Height: 5' 4\" (1.626 m)   --------------------------  Body mass index is 37.42 kg/m². Wt Readings from Last 3 Encounters:  05/07/21 : 218 lb (98.9 kg)  03/08/21 : 216 lb (98 kg)  02/17/21 : 205 lb 0.4 oz (93 kg)    BP Readings from Last 3 Encounters:  05/07/21 : 124/72  03/08/21 : 132/88  02/18/21 : (!) 128/105            Review of Systems    Objective:   Physical Exam  Constitutional:       General: She is not in acute distress. Appearance: Normal appearance. She is well-developed. She is not ill-appearing or diaphoretic. Cardiovascular:      Rate and Rhythm: Normal rate and regular rhythm. Heart sounds: Normal heart sounds. No murmur. No friction rub. No gallop. Pulmonary:      Effort: Pulmonary effort is normal. No tachypnea, accessory muscle usage or respiratory distress. Breath sounds: Normal breath sounds. No decreased breath sounds, wheezing, rhonchi or rales. Lymphadenopathy:      Cervical: No cervical adenopathy. Upper Body:      Right upper body: No supraclavicular adenopathy. Left upper body: No supraclavicular adenopathy. Skin:     General: Skin is warm and dry. Coloration: Skin is not pale. Neurological:      Mental Status: She is alert.          Assessment: Diagnosis Orders   1.  Insomnia, unspecified type       Orders Placed This Encounter   Medications    traZODone (DESYREL) 50 MG tablet     Sig: Take 0.5 tablets by mouth nightly as needed for Sleep     Dispense:  30 tablet     Refill:  0     Recent labs bmp ok on 2/10  Cbc ok on 2/17  She aw pulmonary dr Carleen Salinas for her severe copd on 3/8      Plan:      Try the medicine about an hour before bedtime  Take one half pill         Valeria Hendrix MD

## 2021-05-14 NOTE — PROGRESS NOTES
[facility]   Electrophysiology      Date: 5/18/2021    Primary Cardiologist: Marquis Garcia MD  PCP: Nikita De Paz MD     Chief Complaint:   Chief Complaint   Patient presents with    Follow-up     afib - SOB, some feet/ankle swelling     History of Present Illness:    I saw Flower Hylton in the office for electrophysiology follow up today. She is a 77 y.o. female with a past medical history of persistent atrial fibrillation, AAA, CAD, HTN, COPD, PE, DVT and diastolic HF. She underwent EP study with radiofrequency ablation of atrial fibrillation and pulmonary vein isolation, additional ablation with creation of a roof line (for L flutter) and anterior line from mitral annulus to the roof (for L flutter) on 10/7/20 with Dr. Juan A Emerson. She had recurrent A fib when seen in the office for follow up in January 2021. She underwent successful DC cardioversion on 1/12/21. She then underwent EP study with RFA of left atrial flutter with pulmonary vein isolation, roof line ablation (for L flutter), anterior wall (L flutter), appendage ablation (for L flutter) and low anterior wall ablation (for L flutter) on 2/17/21 with Dr. Juan A Emerson. Following the procedure she had difficultly speaking, head CT and brain MRI were non-acute and she was admitted overnight. She presents today for procedure follow up. She has been feeling well since her ablation. She has not been in A fib/flutter that she has been aware of. She does have dyspnea on exertion but it has overall improved. Denies any chest pain or palpitations. She will have some swelling in her ankles but this resolves with elevation. No bleeding issues. Allergies: Allergies   Allergen Reactions    Morphine Rash     rash    Other Rash     Home Medications:  Prior to Visit Medications    Medication Sig Taking?  Authorizing Provider   traZODone (DESYREL) 50 MG tablet Take 0.5 tablets by mouth nightly as needed for Sleep Yes Nikita De Paz MD   NIFEdipine (ADALAT CC) 60 MG extended release tablet TAKE ONE TABLET BY MOUTH DAILY Yes Elaine Zayas MD   metoprolol succinate (TOPROL XL) 25 MG extended release tablet Take 0.5 tablets by mouth daily Yes CHRISTINE Saucedo CNP   rivaroxaban (XARELTO) 20 MG TABS tablet TAKE ONE TABLET BY MOUTH DAILY WITH BREAKFAST Yes Galilea Cabrera DO   rosuvastatin (CRESTOR) 20 MG tablet Take 1 tablet by mouth daily Yes Jonn Connell MD   isosorbide mononitrate (IMDUR) 30 MG extended release tablet TAKE ONE TABLET BY MOUTH DAILY Yes Elaine Zayas MD   albuterol sulfate HFA (PROAIR HFA) 108 (90 Base) MCG/ACT inhaler Inhale 2 puffs into the lungs every 4 hours as needed for Wheezing or Shortness of Breath Yes Galilea Cabrera DO   Arformoterol Tartrate (BROVANA) 15 MCG/2ML NEBU Take 2 mLs by nebulization 2 times daily Yes Oksana Hdez MD   hydrALAZINE (APRESOLINE) 50 MG tablet Take 1 tablet by mouth every 8 hours Yes Oksana Hdez MD   albuterol (PROVENTIL) (2.5 MG/3ML) 0.083% nebulizer solution Take 3 mLs by nebulization every 4 hours as needed for Wheezing Yes Galilea Cabrera DO   budesonide (PULMICORT) 0.5 MG/2ML nebulizer suspension Take 2 mLs by nebulization 2 times daily Yes Galilea Cabrera DO   formoterol (PERFOROMIST) 20 MCG/2ML nebulizer solution Take 2 mLs by nebulization 2 times daily Yes Galilea Cabrera DO   nitroGLYCERIN (NITROSTAT) 0.4 MG SL tablet Place 1 tablet under the tongue every 5 minutes as needed for Chest pain Yes Nikita De Paz MD   Multiple Vitamins-Minerals (MULTI FOR HER 50+ PO) Take 1 tablet by mouth daily Yes Historical Provider, MD        Past Medical History:  Past Medical History:   Diagnosis Date    AAA (abdominal aortic aneurysm) ([facility]     pt states it is 4cm    AAA (abdominal aortic aneurysm) without rupture ([facility] [facility] 2/10/2015    Atrial fibrillation ([facility] [facility]     CAD (coronary artery disease)     CHF (congestive heart failure) ([facility] [facility]     COPD (chronic obstructive pulmonary disease) [facility] [address]     History of blood clots     Hyperlipidemia     Hypertension        Past Surgical History:   Past Surgical History:   Procedure Laterality Date    ABDOMINAL AORTIC ANEURYSM REPAIR      Endovascular abdominal AA    APPENDECTOMY  1990    incidental    BLADDER SUSPENSION      CATARACT REMOVAL      CHOLECYSTECTOMY  10/15/13    COLONOSCOPY  9/2/07    dr Karely Walker and check in 5 years.  COLONOSCOPY  06/16/2017    ok dr arndt, repeat 5 years   [address]    for benign tumor. just the uterus    JOINT REPLACEMENT  12/2013    right knee replacement    TONSILLECTOMY  as a child    TUMOR EXCISION      benign behind right ear about 2008       Social History:   reports that she quit smoking about 7 years ago. Her smoking use included cigarettes. She started smoking about 44 years ago. She has a 37.00 pack-year smoking history. She has never used smokeless tobacco. She reports that she does not drink alcohol and does not use drugs. Family History:      Problem Relation Age of Onset    Heart Disease Father     Hypertension Other        Review of Systems   Constitutional: Negative for chills, fatigue, fever and unexpected weight change. HENT: Negative for congestion, hearing loss, sinus pressure, sore throat and trouble swallowing. Respiratory: Positive for shortness of breath (LI). Negative for cough and wheezing. Cardiovascular: Positive for leg swelling (At night, resolved in AM). Negative for chest pain and palpitations. Gastrointestinal: Negative for abdominal pain, blood in stool, constipation, diarrhea, nausea and vomiting. Genitourinary: Negative for hematuria. Musculoskeletal: Negative for arthralgias, back pain, gait problem and myalgias. Skin: Negative for color change, rash and wound. Neurological: Negative for dizziness, seizures, syncope, speech difficulty, weakness and light-headedness.    Hematological: Does not bruise/bleed easily. Physical Examination:  Vitals:    05/18/21 1330   BP: 134/70   Pulse: 72   SpO2: 91%      Wt Readings from Last 3 Encounters:   05/18/21 217 lb (98.4 kg)   05/07/21 218 lb (98.9 kg)   03/08/21 216 lb (98 kg)       Physical Exam  Vitals reviewed. Constitutional:       General: She is not in acute distress. Appearance: Normal appearance. HENT:      Head: Normocephalic and atraumatic. Nose: Nose normal.      Mouth/Throat:      Mouth: Mucous membranes are moist.   Eyes:      Conjunctiva/sclera: Conjunctivae normal.      Pupils: Pupils are equal, round, and reactive to light. Cardiovascular:      Rate and Rhythm: Normal rate and regular rhythm. Heart sounds: No murmur heard. No friction rub. No gallop. Pulmonary:      Effort: No respiratory distress. Breath sounds: No wheezing, rhonchi or rales. Abdominal:      General: Abdomen is flat. Bowel sounds are normal.      Palpations: Abdomen is soft. Musculoskeletal:         General: Normal range of motion. Right lower leg: Edema (trace) present. Left lower leg: Edema (trace) present. Skin:     General: Skin is warm and dry. Findings: No bruising. Neurological:      General: No focal deficit present. Mental Status: She is alert and oriented to person, place, and time. Motor: No weakness. Psychiatric:         Mood and Affect: Mood normal.         Behavior: Behavior normal.          Pertinent labs, diagnostic, device, and imaging results reviewed as a part of this visit    LABS    CBC:   Lab Results   Component Value Date    WBC 9.2 02/17/2021    HGB 12.3 02/17/2021    HCT 37.6 02/17/2021    MCV 90.0 02/17/2021     02/17/2021     BMP:   Lab Results   Component Value Date    CREATININE 1.2 02/10/2021    BUN 25 (H) 02/10/2021     02/10/2021    K 4.3 02/10/2021    CL 98 (L) 02/10/2021    CO2 28 02/10/2021     Estimated Creatinine Clearance: 46 mL/min (based on SCr of 1.2 mg/dL).    No results found for: BNP    Thyroid:   Lab Results   Component Value Date    TSH 0.66 2019     Lipid Panel:   Lab Results   Component Value Date    CHOL 134 2021    HDL 45 2021    HDL 38 2011    TRIG 144 2021     LFTs:  Lab Results   Component Value Date    ALT 10 2021    AST 18 2021    ALKPHOS 82 2020    BILITOT 0.4 2020     Coags:   Lab Results   Component Value Date    PROTIME 26.0 (H) 02/15/2020    INR 2.22 (H) 02/15/2020    APTT 37.2 (H) 02/15/2020       EC2021   SR at 71 BPM. PACs. Baseline artifact. Echo: 20   Conclusions      Summary   Concentric LVH with normal LV size and wall motion. EF is    60%.  Grade II diastolic dysfunction with elevated LV filling pressures.   Trivial mitral regurgitation is present.   The left atrium is severely dilated.   Mild aortic regurgitation.   Right ventricular systolic function is normal .   Trivial tricuspid regurgitation.     LHC: 3/5/18  OVERALL IMPRESSION  1.  Again, 100% proximal right coronary artery occlusion with left to right  collaterals. 2.  Mild disease of the distal left main trunk. 3.  20% to 30% ostial left anterior descending artery stenosis with  collaterals from LAD to the right coronary artery. 4.  30% to 40% stenosis of the proximal circumflex artery. 5.  Normal left ventricular systolic function with estimated EF of 55% to  60%. 6.  Slightly enlarged aortic root with no evidence of aortic stenosis or  Regurgitation.     RHC: 20  OVERALL IMPRESSION:  1.  _____ normal right cardiac pressure. 2.  Elevated pulmonary capillary wedge pressure with a mean pulmonary  capillary wedge of 29 mmHg. 3.  Normal cardiac output and indices. 4.  No oxygen step off to suggest ASD/PFO.     Stress: 18  Conclusions          Summary     Abnormal study.  There is a moderate sized, mild intensity, partially     reversible defect of the mid to apical anterior and mid anterolateral walls  which is consistent with ischemia. There is breast attenuation but stress     images appear worse.     Normal LV size and systolic function.     Overall findings represent an intermediate risk study.            Procedures:  1. PVI, roof line (for L flutter), anterior line ablation (for L flutter) 10/7/20, Dr. Katie Kennedy  2. Successful DCCV for persistent A fib, 1/12/21, Dr. Katie Kennedy  3. PVI for L flutter, roof line (for L flutter), anterior wall (L flutter), appendage ablation (for L flutter) and low anterior wall ablation (for L flutter), 2/17/2, Dr. Katie Kennedy.      Assessment and Plan:      Persistent atrial fibrillation             - s/p PVI ablation on 10/7/20 with Dr. Katie Kennedy, had recurrent A fib noted in January 2021 and had successful DCCV later that month              - s/p PVI again in February 2021              - Rishi [address] 5 (age, gender, HTN, HF, CAD) on Xarelto 20mg QD            - stop flecainide now she is outside the 90 day blanking period   - 30 day cardiac monitor     Left atrial flutter            - seen on EP study s/p roof line and anterior line ablations 10/7/20            - repeat ablation detailed above on 2/17/21            - see above     Chronic diastolic heart failure             - EF 60%             - appears well compensated     CAD              - stable              - on statin, beta blocker     AAA              - s/p repair in 2018 by Dr. Merissa Ashraf                - BP controlled            Thank you for allowing to us to participate in the care of Carlos Pemberton. Return in about 2 months (around 7/18/2021) for an appointment with Dr. Katie Kennedy.      [facility]  [facility] [address] [place] [address], [address] [place] [address]  Phone: [phone]  Fax: [phone]    Electronically signed by CHRISTINE Rodriguez - CNP on 5/18/2021 at 1:55 PM

## 2021-05-18 ENCOUNTER — OFFICE VISIT (OUTPATIENT)
Dept: CARDIOLOGY CLINIC | Age: 76
End: 2021-05-18
Payer: MEDICARE

## 2021-05-18 VITALS
OXYGEN SATURATION: 91 % | HEART RATE: 72 BPM | SYSTOLIC BLOOD PRESSURE: 134 MMHG | BODY MASS INDEX: 37.05 KG/M2 | WEIGHT: 217 LBS | DIASTOLIC BLOOD PRESSURE: 70 MMHG | HEIGHT: 64 IN

## 2021-05-18 DIAGNOSIS — I25.10 CORONARY ARTERY DISEASE INVOLVING NATIVE CORONARY ARTERY OF NATIVE HEART WITHOUT ANGINA PECTORIS: ICD-10-CM

## 2021-05-18 DIAGNOSIS — I50.32 CHRONIC DIASTOLIC HEART FAILURE (HCC): ICD-10-CM

## 2021-05-18 DIAGNOSIS — I48.19 PERSISTENT ATRIAL FIBRILLATION (HCC): Primary | ICD-10-CM

## 2021-05-18 DIAGNOSIS — I71.40 AAA (ABDOMINAL AORTIC ANEURYSM) WITHOUT RUPTURE: ICD-10-CM

## 2021-05-18 DIAGNOSIS — I48.92 LEFT ATRIAL FLUTTER BY ELECTROCARDIOGRAM (HCC): ICD-10-CM

## 2021-05-18 PROCEDURE — 1036F TOBACCO NON-USER: CPT | Performed by: NURSE PRACTITIONER

## 2021-05-18 PROCEDURE — 1123F ACP DISCUSS/DSCN MKR DOCD: CPT | Performed by: NURSE PRACTITIONER

## 2021-05-18 PROCEDURE — 1090F PRES/ABSN URINE INCON ASSESS: CPT | Performed by: NURSE PRACTITIONER

## 2021-05-18 PROCEDURE — G8417 CALC BMI ABV UP PARAM F/U: HCPCS | Performed by: NURSE PRACTITIONER

## 2021-05-18 PROCEDURE — 3017F COLORECTAL CA SCREEN DOC REV: CPT | Performed by: NURSE PRACTITIONER

## 2021-05-18 PROCEDURE — 93000 ELECTROCARDIOGRAM COMPLETE: CPT | Performed by: NURSE PRACTITIONER

## 2021-05-18 PROCEDURE — 4040F PNEUMOC VAC/ADMIN/RCVD: CPT | Performed by: NURSE PRACTITIONER

## 2021-05-18 PROCEDURE — 99214 OFFICE O/P EST MOD 30 MIN: CPT | Performed by: NURSE PRACTITIONER

## 2021-05-18 PROCEDURE — G8399 PT W/DXA RESULTS DOCUMENT: HCPCS | Performed by: NURSE PRACTITIONER

## 2021-05-18 PROCEDURE — G8427 DOCREV CUR MEDS BY ELIG CLIN: HCPCS | Performed by: NURSE PRACTITIONER

## 2021-05-18 ASSESSMENT — ENCOUNTER SYMPTOMS
SORE THROAT: 0
BLOOD IN STOOL: 0
BACK PAIN: 0
NAUSEA: 0
TROUBLE SWALLOWING: 0
COUGH: 0
WHEEZING: 0
ABDOMINAL PAIN: 0
DIARRHEA: 0
SINUS PRESSURE: 0
SHORTNESS OF BREATH: 1
CONSTIPATION: 0
COLOR CHANGE: 0
VOMITING: 0

## 2021-05-19 PROCEDURE — 93228 REMOTE 30 DAY ECG REV/REPORT: CPT | Performed by: INTERNAL MEDICINE

## 2021-05-25 ENCOUNTER — PROCEDURE VISIT (OUTPATIENT)
Dept: SURGERY | Age: 76
End: 2021-05-25
Payer: MEDICARE

## 2021-05-25 ENCOUNTER — OFFICE VISIT (OUTPATIENT)
Dept: SURGERY | Age: 76
End: 2021-05-25
Payer: MEDICARE

## 2021-05-25 VITALS — SYSTOLIC BLOOD PRESSURE: 170 MMHG | DIASTOLIC BLOOD PRESSURE: 80 MMHG | WEIGHT: 220 LBS | BODY MASS INDEX: 37.76 KG/M2

## 2021-05-25 DIAGNOSIS — I71.40 AAA (ABDOMINAL AORTIC ANEURYSM) WITHOUT RUPTURE: Chronic | ICD-10-CM

## 2021-05-25 DIAGNOSIS — E66.01 MORBIDLY OBESE (HCC): ICD-10-CM

## 2021-05-25 DIAGNOSIS — I73.9 PVD (PERIPHERAL VASCULAR DISEASE) (HCC): Primary | ICD-10-CM

## 2021-05-25 DIAGNOSIS — I71.40 AAA (ABDOMINAL AORTIC ANEURYSM) WITHOUT RUPTURE: Primary | ICD-10-CM

## 2021-05-25 DIAGNOSIS — I65.23 CAROTID STENOSIS, ASYMPTOMATIC, BILATERAL: ICD-10-CM

## 2021-05-25 PROCEDURE — G8427 DOCREV CUR MEDS BY ELIG CLIN: HCPCS | Performed by: SURGERY

## 2021-05-25 PROCEDURE — 93978 VASCULAR STUDY: CPT | Performed by: SURGERY

## 2021-05-25 PROCEDURE — G8399 PT W/DXA RESULTS DOCUMENT: HCPCS | Performed by: SURGERY

## 2021-05-25 PROCEDURE — 1123F ACP DISCUSS/DSCN MKR DOCD: CPT | Performed by: SURGERY

## 2021-05-25 PROCEDURE — 99214 OFFICE O/P EST MOD 30 MIN: CPT | Performed by: SURGERY

## 2021-05-25 PROCEDURE — G8417 CALC BMI ABV UP PARAM F/U: HCPCS | Performed by: SURGERY

## 2021-05-25 PROCEDURE — 4040F PNEUMOC VAC/ADMIN/RCVD: CPT | Performed by: SURGERY

## 2021-05-25 PROCEDURE — 3017F COLORECTAL CA SCREEN DOC REV: CPT | Performed by: SURGERY

## 2021-05-25 PROCEDURE — 1036F TOBACCO NON-USER: CPT | Performed by: SURGERY

## 2021-05-25 PROCEDURE — 1090F PRES/ABSN URINE INCON ASSESS: CPT | Performed by: SURGERY

## 2021-05-25 PROCEDURE — 93880 EXTRACRANIAL BILAT STUDY: CPT | Performed by: SURGERY

## 2021-05-25 ASSESSMENT — ENCOUNTER SYMPTOMS
RESPIRATORY NEGATIVE: 1
ALLERGIC/IMMUNOLOGIC NEGATIVE: 1
GASTROINTESTINAL NEGATIVE: 1

## 2021-05-25 NOTE — PROGRESS NOTES
Daily Progress Note   Jaden Bishop MD      5/25/2021    Chief Complaint   Patient presents with    1 Year Follow Up     Pt states she feels fine, hasnt been seen since 2019, no other changes noted. Will try and get her scanned today         HISTORY OF PRESENT ILLNESS:                The patient is a 77 y.o. female who presents with 6 month follow up for AAA,. S/p Endovascular Aortic Aneurysm Repair performed on 3/21/2018. She mentions that she dislikes taking medications, however she does take her medications at night. 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 Past Medical History:   Diagnosis Date    AAA (abdominal aortic aneurysm) ([facility] [address]     pt states it is 4cm    AAA (abdominal aortic aneurysm) without rupture (HCC) 2/10/2015    Atrial fibrillation (HCC)     CAD (coronary artery disease)     CHF (congestive heart failure) ([address]     COPD (chronic obstructive pulmonary disease) (HCC)     History of blood clots     Hyperlipidemia     Hypertension        Past Surgical History:   Procedure Laterality Date    ABDOMINAL AORTIC ANEURYSM REPAIR      Endovascular abdominal AA    APPENDECTOMY  1990    incidental    BLADDER SUSPENSION      CATARACT REMOVAL      CHOLECYSTECTOMY  10/15/13    COLONOSCOPY  9/2/07    dr Nayeli Odonnell and check in 5 years.  COLONOSCOPY  06/16/2017    ok dr arndt, repeat 5 years   [address]    for benign tumor.  just the uterus    JOINT REPLACEMENT  12/2013    right knee replacement    TONSILLECTOMY  as a child    TUMOR EXCISION      benign behind right ear about 2008       Social History     Socioeconomic History    Marital status:      Spouse name: Not on file    Number of children: Not on file    Years of education: Not on file    Highest education level: Not on file   Occupational History    Occupation: housewife   Tobacco Use  Smoking status: Former Smoker     Packs/day: 1.00     Years: 37.00     Pack years: 37.00     Types: Cigarettes     Start date: 1976     Quit date: 2013     Years since quittin.6    Smokeless tobacco: Never Used    Tobacco comment: E cigarette now and then   Vaping Use    Vaping Use: Former   Substance and Sexual Activity    Alcohol use: No    Drug use: No    Sexual activity: Yes     Partners: Male   Other Topics Concern    Not on file   Social History Narrative    Not on file     Social Determinants of Health     Financial Resource Strain:     Difficulty of Paying Living Expenses:    Food Insecurity:     Worried About Running Out of Food in the Last Year:     Ran Out of Food in the Last Year:    Transportation Needs:     Lack of Transportation (Medical):      Lack of Transportation (Non-Medical):    Physical Activity:     Days of Exercise per Week:     Minutes of Exercise per Session:    Stress:     Feeling of Stress :    Social Connections:     Frequency of Communication with Friends and Family:     Frequency of Social Gatherings with Friends and Family:     Attends Sikh Services:     Active Member of Clubs or Organizations:     Attends Club or Organization Meetings:     Marital Status:    Intimate Partner Violence:     Fear of Current or Ex-Partner:     Emotionally Abused:     Physically Abused:     Sexually Abused:        Family History   Problem Relation Age of Onset    Heart Disease Father     Hypertension Other          Current Outpatient Medications:     traZODone (DESYREL) 50 MG tablet, Take 0.5 tablets by mouth nightly as needed for Sleep, Disp: 30 tablet, Rfl: 0    NIFEdipine (ADALAT CC) 60 MG extended release tablet, TAKE ONE TABLET BY MOUTH DAILY, Disp: 90 tablet, Rfl: 3    metoprolol succinate (TOPROL XL) 25 MG extended release tablet, Take 0.5 tablets by mouth daily, Disp: 15 tablet, Rfl: 3    rivaroxaban (XARELTO) 20 MG TABS tablet, TAKE ONE TABLET She is not in acute distress. Appearance: She is well-developed. She is not ill-appearing or toxic-appearing. Comments: Obese   HENT:      Head: Normocephalic and atraumatic. Right Ear: External ear normal.      Left Ear: External ear normal.   Eyes:      General: No scleral icterus. Pupils: Pupils are equal, round, and reactive to light. Neck:      Thyroid: No thyromegaly. Vascular: Normal carotid pulses. No carotid bruit or JVD. Trachea: No tracheal deviation. Cardiovascular:      Rate and Rhythm: Normal rate and regular rhythm. Pulses:           Carotid pulses are 2+ on the right side and 2+ on the left side. Femoral pulses are 2+ on the right side and 2+ on the left side. Dorsalis pedis pulses are 2+ on the right side and 2+ on the left side. Posterior tibial pulses are 0 on the right side and 0 on the left side. Heart sounds: Normal heart sounds and S1 normal. No murmur heard. Comments: Doppler 2/15/19:    Right DP: Biphasic  Right PT: Monophasic (weak)    Left DP: Biphasic  Left PT: Monophasic  Pulmonary:      Effort: Pulmonary effort is normal. No tachypnea, accessory muscle usage or respiratory distress. Breath sounds: Normal breath sounds. No stridor. No wheezing or rales. Abdominal:      General: Bowel sounds are normal. There is no distension or abdominal bruit. Palpations: Abdomen is soft. Abdomen is not rigid. There is no mass. Tenderness: There is no abdominal tenderness. There is no guarding or rebound. Hernia: No hernia is present. There is no hernia in the ventral area or left inguinal area. Genitourinary:     Comments: Rectal exam/stool guaiac not indicated. Musculoskeletal:         General: No tenderness. Right shoulder: No deformity. Normal range of motion. Cervical back: Normal range of motion and neck supple. No edema or erythema.    Lymphadenopathy:      Head:      Right side of head: No submandibular, preauricular, posterior auricular or occipital adenopathy. Left side of head: No submandibular, preauricular, posterior auricular or occipital adenopathy. Cervical: No cervical adenopathy. Right cervical: No superficial, deep or posterior cervical adenopathy. Left cervical: No superficial, deep or posterior cervical adenopathy. Upper Body:      Right upper body: No supraclavicular or pectoral adenopathy. Left upper body: No supraclavicular or pectoral adenopathy. Lower Body: No right inguinal adenopathy. No left inguinal adenopathy. Skin:     General: Skin is warm and dry. Coloration: Skin is not pale. Findings: No bruising, erythema, laceration, lesion or rash. Neurological:      Mental Status: She is alert and oriented to person, place, and time. Cranial Nerves: No cranial nerve deficit. Sensory: No sensory deficit. Motor: No atrophy or abnormal muscle tone. Coordination: Coordination normal.      Gait: Gait normal.      Deep Tendon Reflexes: Reflexes are normal and symmetric. Psychiatric:         Speech: Speech normal.         Behavior: Behavior normal. Behavior is cooperative. Thought Content: Thought content normal.         Judgment: Judgment normal.       05/25/21  Femoral: Right: +2 Left: +2  Dorsalis Pedis: Right +2 Left +2  Posterior Tibial: Right 0 Left: 0  Dorsalis R. Biphasic L.  Biphasic  Posterior Tibial: R. Weak Monophasic                            L. Weak Monophasic  *Posterior on both      Patient had 2 carotid duplex exams back in 2017 has not had one since finding the ultrasound results she had 50% stenosis on the left side went over risk and stroke symptoms and to come to the emergency room right away if she is having stroke symptoms and told that this should be followed every 6 months    ASSESSMENT:    Problem List Items Addressed This Visit     PVD (peripheral vascular disease) ([facility] [address] - Primary Morbidly obese ([address]    [place]ole[place] post (EVAR) endovascular aneurysm repair, sequela    AAA (abdominal aortic aneurysm) without rupture (HCC) (Chronic)      Do not have the official reading back but apparently the carotid shows no changed and the abdominal aortic aneurysm has no change I will review the films myself later this afternoon    PLAN:  Mrs. Milan Ballesteros is doing well, no complaints of pain today. She will follow up in 6 months for an AAA, CDS study with OV. She is aware that she should not have breakfast the day that she would return for the AAA study, nothing to eat or drink 5 hours before. Jorge Mckeon MD personally performed the services described in this documentation as scribed by the Medical Assistant Tete Steen  in my presence and it is both accurate and complete.       Electronically signed by Bety Mckeon MD on 5/25/2021 at 1:26 PM

## 2021-06-07 ENCOUNTER — OFFICE VISIT (OUTPATIENT)
Dept: FAMILY MEDICINE CLINIC | Age: 76
End: 2021-06-07
Payer: MEDICARE

## 2021-06-07 VITALS
TEMPERATURE: 98.2 F | WEIGHT: 223 LBS | DIASTOLIC BLOOD PRESSURE: 70 MMHG | HEART RATE: 76 BPM | BODY MASS INDEX: 38.07 KG/M2 | HEIGHT: 64 IN | SYSTOLIC BLOOD PRESSURE: 130 MMHG

## 2021-06-07 DIAGNOSIS — R82.90 ABNORMAL URINE ODOR: Primary | ICD-10-CM

## 2021-06-07 DIAGNOSIS — R25.2 BILATERAL LEG CRAMPS: ICD-10-CM

## 2021-06-07 DIAGNOSIS — R82.90 ABNORMAL URINE ODOR: ICD-10-CM

## 2021-06-07 PROCEDURE — G8417 CALC BMI ABV UP PARAM F/U: HCPCS | Performed by: FAMILY MEDICINE

## 2021-06-07 PROCEDURE — 1036F TOBACCO NON-USER: CPT | Performed by: FAMILY MEDICINE

## 2021-06-07 PROCEDURE — 3017F COLORECTAL CA SCREEN DOC REV: CPT | Performed by: FAMILY MEDICINE

## 2021-06-07 PROCEDURE — G8427 DOCREV CUR MEDS BY ELIG CLIN: HCPCS | Performed by: FAMILY MEDICINE

## 2021-06-07 PROCEDURE — 4040F PNEUMOC VAC/ADMIN/RCVD: CPT | Performed by: FAMILY MEDICINE

## 2021-06-07 PROCEDURE — 1090F PRES/ABSN URINE INCON ASSESS: CPT | Performed by: FAMILY MEDICINE

## 2021-06-07 PROCEDURE — 99213 OFFICE O/P EST LOW 20 MIN: CPT | Performed by: FAMILY MEDICINE

## 2021-06-07 PROCEDURE — G8399 PT W/DXA RESULTS DOCUMENT: HCPCS | Performed by: FAMILY MEDICINE

## 2021-06-07 PROCEDURE — 1123F ACP DISCUSS/DSCN MKR DOCD: CPT | Performed by: FAMILY MEDICINE

## 2021-06-07 SDOH — ECONOMIC STABILITY: FOOD INSECURITY: WITHIN THE PAST 12 MONTHS, YOU WORRIED THAT YOUR FOOD WOULD RUN OUT BEFORE YOU GOT MONEY TO BUY MORE.: NEVER TRUE

## 2021-06-07 SDOH — ECONOMIC STABILITY: FOOD INSECURITY: WITHIN THE PAST 12 MONTHS, THE FOOD YOU BOUGHT JUST DIDN'T LAST AND YOU DIDN'T HAVE MONEY TO GET MORE.: NEVER TRUE

## 2021-06-07 ASSESSMENT — SOCIAL DETERMINANTS OF HEALTH (SDOH): HOW HARD IS IT FOR YOU TO PAY FOR THE VERY BASICS LIKE FOOD, HOUSING, MEDICAL CARE, AND HEATING?: NOT HARD AT ALL

## 2021-06-07 NOTE — PATIENT INSTRUCTIONS
Do the lab testing today  Call me when you get home to tell me the torsemide dose that you are on  Call if worse

## 2021-06-07 NOTE — PROGRESS NOTES
Subjective:      Patient ID: Jon Walton is a 77 y.o. female. Chief Complaint   Patient presents with    Leg Pain     cramps in legs    Urinary Tract Infection     hardly going unless takes a water pill        Patient presents with:  Leg Pain: cramps in legs  Urinary Tract Infection: hardly going unless takes a water pill    Urine odor for several weeks, no pain no blood  Some frequency    Bilateral leg cramps. She notes with activity or at rest. Comes and goes   For a week   She denies abdomen pain  She has no sob no cp  She does note some swelling in the legs during the day    She is still taking the torsemide 20 mg daily.  It would seem to have been stopped in 2/11    YOB: 1945    Date of Visit:  6/7/2021     -- Morphine -- Rash    --  rash   -- Other -- Rash    Current Outpatient Medications:  traZODone (DESYREL) 50 MG tablet, Take 0.5 tablets by mouth nightly as needed for Sleep, Disp: 30 tablet, Rfl: 0  NIFEdipine (ADALAT CC) 60 MG extended release tablet, TAKE ONE TABLET BY MOUTH DAILY, Disp: 90 tablet, Rfl: 3  metoprolol succinate (TOPROL XL) 25 MG extended release tablet, Take 0.5 tablets by mouth daily, Disp: 15 tablet, Rfl: 3  rivaroxaban (XARELTO) 20 MG TABS tablet, TAKE ONE TABLET BY MOUTH DAILY WITH BREAKFAST, Disp: 30 tablet, Rfl: 4  rosuvastatin (CRESTOR) 20 MG tablet, Take 1 tablet by mouth daily, Disp: 30 tablet, Rfl: 11  isosorbide mononitrate (IMDUR) 30 MG extended release tablet, TAKE ONE TABLET BY MOUTH DAILY, Disp: 90 tablet, Rfl: 5  albuterol sulfate HFA (PROAIR HFA) 108 (90 Base) MCG/ACT inhaler, Inhale 2 puffs into the lungs every 4 hours as needed for Wheezing or Shortness of Breath, Disp: 1 Inhaler, Rfl: 11  Arformoterol Tartrate (BROVANA) 15 MCG/2ML NEBU, Take 2 mLs by nebulization 2 times daily, Disp: 120 mL, Rfl: 3  hydrALAZINE (APRESOLINE) 50 MG tablet, Take 1 tablet by mouth every 8 hours, Disp: 90 tablet, Rfl: 3  albuterol (PROVENTIL) (2.5 MG/3ML) 0.083% nebulizer solution, Take 3 mLs by nebulization every 4 hours as needed for Wheezing, Disp: 120 mL, Rfl: 5  budesonide (PULMICORT) 0.5 MG/2ML nebulizer suspension, Take 2 mLs by nebulization 2 times daily, Disp: 360 mL, Rfl: 3  formoterol (PERFOROMIST) 20 MCG/2ML nebulizer solution, Take 2 mLs by nebulization 2 times daily, Disp: 120 mL, Rfl: 3  nitroGLYCERIN (NITROSTAT) 0.4 MG SL tablet, Place 1 tablet under the tongue every 5 minutes as needed for Chest pain, Disp: 25 tablet, Rfl: 1  Multiple Vitamins-Minerals (MULTI FOR HER 50+ PO), Take 1 tablet by mouth daily, Disp: , Rfl:     No current facility-administered medications for this visit.      ---------------------------               06/07/21                      1420         ---------------------------   BP:          130/70         Site:    Left Upper Arm     Position:     Sitting        Cuff Size:   Large Adult      Pulse:         76           Temp:   98.2 °F (36.8 °C)   TempSrc:      Oral          Weight: 223 lb (101.2 kg)   Height:  5' 4\" (1.626 m)   ---------------------------  Body mass index is 38.28 kg/m². Wt Readings from Last 3 Encounters:  06/07/21 : 223 lb (101.2 kg)  05/25/21 : 220 lb (99.8 kg)  05/18/21 : 217 lb (98.4 kg)    BP Readings from Last 3 Encounters:  06/07/21 : 130/70  05/25/21 : (!) 170/80  05/18/21 : 134/70        Review of Systems    Objective:   Physical Exam  Constitutional:       General: She is not in acute distress. Appearance: Normal appearance. She is well-developed. She is not ill-appearing or diaphoretic. Cardiovascular:      Rate and Rhythm: Normal rate and regular rhythm. Pulses:           Dorsalis pedis pulses are 2+ on the right side and 2+ on the left side. Heart sounds: Normal heart sounds. No murmur heard. No friction rub. No gallop.        Comments: 1+ edema both lower legs  Bilateral varicose veins  Pulmonary:      Effort: Pulmonary effort is normal. No tachypnea, accessory muscle usage or respiratory distress. Breath sounds: Normal breath sounds. No decreased breath sounds, wheezing, rhonchi or rales. Abdominal:      Palpations: Abdomen is soft. There is no hepatomegaly, splenomegaly, mass or pulsatile mass. Tenderness: There is no abdominal tenderness. There is no right CVA tenderness, left CVA tenderness or guarding. Musculoskeletal:      Comments: Bilateral leg pain to palpate both no cords felt    Lymphadenopathy:      Cervical: No cervical adenopathy. Upper Body:      Right upper body: No supraclavicular adenopathy. Left upper body: No supraclavicular adenopathy. Skin:     General: Skin is warm and dry. Coloration: Skin is not pale. Neurological:      Mental Status: She is alert. Assessment:        Diagnosis Orders   1. Abnormal urine odor  Urinalysis with Microscopic    Culture, Urine   2. Bilateral leg cramps  MAGNESIUM    Basic Metabolic Panel    CBC Auto Differential       She had cardiac f/u on 5/18 for persistent afib and chronic diastolic failure. reviewed  Cardiac note reviewed on 1/29/21 and she had been on torsemide 20 bid   It was removed on 2/11 from note.    She is still on the torsemide      Plan:      Do the lab testing today  Call me when you get home to tell me the torsemide dose that you are on  Call if worse        Roberto Ceron MD

## 2021-06-08 DIAGNOSIS — E87.0 HYPERNATREMIA: Primary | ICD-10-CM

## 2021-06-08 LAB
ANION GAP SERPL CALCULATED.3IONS-SCNC: 22 MMOL/L (ref 3–16)
BASOPHILS ABSOLUTE: 0.1 K/UL (ref 0–0.2)
BASOPHILS RELATIVE PERCENT: 0.7 %
BILIRUBIN URINE: NEGATIVE
BLOOD, URINE: NEGATIVE
BUN BLDV-MCNC: 27 MG/DL (ref 7–20)
CALCIUM SERPL-MCNC: 10.2 MG/DL (ref 8.3–10.6)
CHLORIDE BLD-SCNC: 102 MMOL/L (ref 99–110)
CLARITY: CLEAR
CO2: 25 MMOL/L (ref 21–32)
COLOR: YELLOW
CREAT SERPL-MCNC: 1.4 MG/DL (ref 0.6–1.2)
EOSINOPHILS ABSOLUTE: 0.4 K/UL (ref 0–0.6)
EOSINOPHILS RELATIVE PERCENT: 3.4 %
EPITHELIAL CELLS, UA: 1 /HPF (ref 0–5)
GFR AFRICAN AMERICAN: 44
GFR NON-AFRICAN AMERICAN: 37
GLUCOSE BLD-MCNC: 127 MG/DL (ref 70–99)
GLUCOSE URINE: NEGATIVE MG/DL
HCT VFR BLD CALC: 34.3 % (ref 36–48)
HEMOGLOBIN: 11.5 G/DL (ref 12–16)
HYALINE CASTS: 4 /LPF (ref 0–8)
KETONES, URINE: ABNORMAL MG/DL
LEUKOCYTE ESTERASE, URINE: ABNORMAL
LYMPHOCYTES ABSOLUTE: 1.2 K/UL (ref 1–5.1)
LYMPHOCYTES RELATIVE PERCENT: 11.8 %
MAGNESIUM: 2.2 MG/DL (ref 1.8–2.4)
MCH RBC QN AUTO: 28.6 PG (ref 26–34)
MCHC RBC AUTO-ENTMCNC: 33.5 G/DL (ref 31–36)
MCV RBC AUTO: 85.4 FL (ref 80–100)
MICROSCOPIC EXAMINATION: YES
MONOCYTES ABSOLUTE: 0.8 K/UL (ref 0–1.3)
MONOCYTES RELATIVE PERCENT: 8 %
NEUTROPHILS ABSOLUTE: 7.9 K/UL (ref 1.7–7.7)
NEUTROPHILS RELATIVE PERCENT: 76.1 %
NITRITE, URINE: NEGATIVE
PDW BLD-RTO: 17.1 % (ref 12.4–15.4)
PH UA: 6 (ref 5–8)
PLATELET # BLD: 209 K/UL (ref 135–450)
PMV BLD AUTO: 9.4 FL (ref 5–10.5)
POTASSIUM SERPL-SCNC: 4.5 MMOL/L (ref 3.5–5.1)
PROTEIN UA: ABNORMAL MG/DL
RBC # BLD: 4.02 M/UL (ref 4–5.2)
RBC UA: 8 /HPF (ref 0–4)
SODIUM BLD-SCNC: 149 MMOL/L (ref 136–145)
SPECIFIC GRAVITY UA: 1.02 (ref 1–1.03)
URINE CULTURE, ROUTINE: NORMAL
URINE TYPE: ABNORMAL
UROBILINOGEN, URINE: 0.2 E.U./DL
WBC # BLD: 10.4 K/UL (ref 4–11)
WBC UA: 11 /HPF (ref 0–5)

## 2021-06-08 RX ORDER — TORSEMIDE 10 MG/1
10 TABLET ORAL DAILY
Qty: 90 TABLET | Refills: 3 | Status: ON HOLD
Start: 2021-06-08 | End: 2021-07-05

## 2021-06-09 RX ORDER — CIPROFLOXACIN 250 MG/1
250 TABLET, FILM COATED ORAL 2 TIMES DAILY
Qty: 20 TABLET | Refills: 0 | Status: SHIPPED | OUTPATIENT
Start: 2021-06-09 | End: 2021-06-19

## 2021-06-15 ENCOUNTER — TELEPHONE (OUTPATIENT)
Dept: PULMONOLOGY | Age: 76
End: 2021-06-15

## 2021-06-16 NOTE — TELEPHONE ENCOUNTER
Patient calling because she says according to medicare, [facility] in [place] will be able to give her a portable nebulizer. She would like to know if this portable nebulizer order can be sent there? She wants a call back to let her know.

## 2021-06-16 NOTE — TELEPHONE ENCOUNTER
LVM to Michelle Ryan will need to check with her insurance company to see if they will cover this and what DME company this Rx should be sent to.

## 2021-06-16 NOTE — TELEPHONE ENCOUNTER
Called [facility] in [facility]  they do not have the nebulizer in the store  And insurance usually does not pay    Pt notified of message

## 2021-06-21 NOTE — PROGRESS NOTES
Reactions    Morphine Rash     rash    Other Rash     Current Outpatient Medications   Medication Sig Dispense Refill    rivaroxaban (XARELTO) 20 MG TABS tablet TAKE ONE TABLET BY MOUTH DAILY WITH BREAKFAST 30 tablet 3    torsemide (DEMADEX) 10 MG tablet Take 1 tablet by mouth daily 90 tablet 3    traZODone (DESYREL) 50 MG tablet Take 0.5 tablets by mouth nightly as needed for Sleep 30 tablet 0    NIFEdipine (ADALAT CC) 60 MG extended release tablet TAKE ONE TABLET BY MOUTH DAILY 90 tablet 3    metoprolol succinate (TOPROL XL) 25 MG extended release tablet Take 0.5 tablets by mouth daily 15 tablet 3    rosuvastatin (CRESTOR) 20 MG tablet Take 1 tablet by mouth daily 30 tablet 11    isosorbide mononitrate (IMDUR) 30 MG extended release tablet TAKE ONE TABLET BY MOUTH DAILY 90 tablet 5    albuterol sulfate HFA (PROAIR HFA) 108 (90 Base) MCG/ACT inhaler Inhale 2 puffs into the lungs every 4 hours as needed for Wheezing or Shortness of Breath 1 Inhaler 11    Arformoterol Tartrate (BROVANA) 15 MCG/2ML NEBU Take 2 mLs by nebulization 2 times daily 120 mL 3    hydrALAZINE (APRESOLINE) 50 MG tablet Take 1 tablet by mouth every 8 hours 90 tablet 3    albuterol (PROVENTIL) (2.5 MG/3ML) 0.083% nebulizer solution Take 3 mLs by nebulization every 4 hours as needed for Wheezing 120 mL 5    budesonide (PULMICORT) 0.5 MG/2ML nebulizer suspension Take 2 mLs by nebulization 2 times daily 360 mL 3    formoterol (PERFOROMIST) 20 MCG/2ML nebulizer solution Take 2 mLs by nebulization 2 times daily 120 mL 3    nitroGLYCERIN (NITROSTAT) 0.4 MG SL tablet Place 1 tablet under the tongue every 5 minutes as needed for Chest pain 25 tablet 1    Multiple Vitamins-Minerals (MULTI FOR HER 50+ PO) Take 1 tablet by mouth daily       No current facility-administered medications for this visit.        Physical Exam:   /74   Pulse 88   Ht 5' 4\" (1.626 m)   Wt 221 lb 12.8 oz (100.6 kg)   SpO2 96%   BMI 38.07 kg/m²   Wt -age undetermined.    -Nonspecific ST depression  -Nondiagnostic. 6/25/21: Sinus rhythm  -First degree A-V block , -Anteroseptal infarct -age undetermined. Echo 1/9/2018:  Mild concentric left ventricular hypertrophy. Visually estimated ejection fraction of 65%. Mitral annular calcification. Mild left atrial dilation. Mild aortic stenosis. (mean gradients 46RMFA)  The systolic pulmonary artery pressure (SPAP) estimated at 30 mmHg  (estimated RA pressure of 8 mmHg included). [facility]: ([facility]) 4/2017   of RCA chronic LAD 10-20% RA 4 PA24/9 16 wedge 9 LVEDP markedly elevated 30    Carotid US 10/2017 at [facility]:  1. Estimated diameter reduction of the right internal carotid artery is  less than 50%. 2.  Estimated diameter reduction of the left internal carotid artery is  50-69%. 3.  The bilateral common carotid arteries reveal no evidence of   significant stenosis. 4.  There is greater than 50% stenosis of the right external carotid  artery. 5.  There is no evidence of significant stenosis in the left external  carotid artery. 6.  The bilateral vertebral arteries are patent with antegrade flow. 7.  The bilateral subclavian arteries reveal no evidence of significant  stenosis. 8.  There has been no significant change from the previous study of  4/21/2017.     Lexiscan 2/19/18   Summary    Abnormal study. There is a moderate sized, mild intensity, partially    reversible defect of the mid to apical anterior and mid anterolateral walls    which is consistent with ischemia. There is breast attenuation but stress    images appear worse.    Normal LV size and systolic function.    Overall findings represent an intermediate risk study     Cardiac Cath 3/5/18  OVERALL IMPRESSION  1. Again, 100% proximal right coronary artery occlusion with left to right  collaterals. 2.  Mild disease of the distal left main trunk.   3.  20% to 30% ostial left anterior descending artery stenosis with  collaterals from LAD to the right coronary artery. 4.  30% to 40% stenosis of the proximal circumflex artery. 5.  Normal left ventricular systolic function with estimated EF of 55% to  60%. 6.  Slightly enlarged aortic root with no evidence of aortic stenosis or  regurgitation.     In view of the above findings, we will okay the patient for her upcoming  aortic aneurysm surgery from cardiac standpoint. ECHO 1/2/20  There is moderate concentric left ventricular hypertrophy. Patient appears to be in atrial fibrillation. Ejection fraction is estimated to be 50-60%. Indeterminate diastolic function. Mild aortic regurgitation. Mild tricuspid regurgitation. Mild mitral regurgitation with mitral annular calcification    Right Heart Cath 2/28/2020  1.  _____ normal right cardiac pressure. 2.  Elevated pulmonary capillary wedge pressure with a mean pulmonary  capillary wedge of 29 mmHg. 3.  Normal cardiac output and indices. 4.  No oxygen step off to suggest ASD/PFO.    In view of the above findings, we will start the patient on a small dose  of diuretic therapy and see whether we need to adjust some of her  antihypertensive medicines or not. Her findings are consistent with a  diastolic heart failure. 30 day Event monitor: 5/18/21 pending per EP     Assessment/Plan:     Coronary artery disease involving native coronary artery of native heart without angina pectoris  She denies any signs or symptoms of angina today but continues with LI, chronic. Continue medical management with BB, statin, Imdur, hydralainze and PRN SL Nitro. She previously reported myalgias with Crestor with resolution of cramping. Lipid profile 2/8/21 2/8/21 wnl with LDLc 60. Chronic diastolic heart failure  She appears compensated on exam but continues with chronic stable LI, BLE edema. No aldactone secondary to elevated Cr. Continue B-blocker.    Encouraged medication compliance, low salt diet with hx of indiscretions, compression stockings and elevating legs. Pro-bnp 2128 2/8/21, BMP slightly worsening renal function 6/7/21. We will repeat her BMP NP in few  weeks     NYHA class II    Essential hypertension  Blood pressure is stable today on medical therapy. Will monitor BMP-lat 6/7/21, will repeat in a couple of weeks. Persistent atrial fibrillation   CHADS VASC score 7 for age, gender, DVT, CHF, HTN, CAD. Managed per Dr. Karen Galicia. S/p PVI ablation 10/2020, she then had Afib ablation last year but had recurrence of A fib She underwent DCCV 1/12/21,  repeat AFIB ablation 2/17/21. continue Xarelto,  Toprol-XL. Flecainide discontinued 5/18/21 with plans for 30 day Event monitor at his f/u with Cali Tabares CNP. CBC 6/7/21. Results not back, asked her to call in a week if our office does not reach out to her. She does have a follow up scheduled to discus event monitor 7/19/21. AAA (abdominal aortic aneurysm) without rupture   Underwent endovascular AAA repair on 3/21/18 by Dr. Esther Nelson. Follows vascular surgery    Sleep apnea  Intolerant to CPAP. COPD/Asthma managed per Dr. Alyssa Draper. + wheezes scattered on physical exam. Has call into Dr. Milad Ram to discuss medical therapy. Follow up in 4 month. Thank you very much for allowing me to participate in the care of your patient. Please do not hesitate to contact me if you have any questions. Sincerely,  Pablo Hdez MD      [facility] [address] [place] [address]  Ph: [phone]  Fax: [phone]    This note was scribed in the presence of Dr. Jefferey Lefort, MD by Saurabh Rivera RN.

## 2021-06-24 ENCOUNTER — TELEPHONE (OUTPATIENT)
Dept: PULMONOLOGY | Age: 76
End: 2021-06-24

## 2021-06-24 NOTE — TELEPHONE ENCOUNTER
Pt needs stronger rescue inhaler    She also tried to make an appt -1st available 9-15  States she can't wait that long

## 2021-06-25 ENCOUNTER — OFFICE VISIT (OUTPATIENT)
Dept: CARDIOLOGY CLINIC | Age: 76
End: 2021-06-25
Payer: MEDICARE

## 2021-06-25 VITALS
WEIGHT: 221.8 LBS | HEART RATE: 88 BPM | DIASTOLIC BLOOD PRESSURE: 74 MMHG | SYSTOLIC BLOOD PRESSURE: 118 MMHG | OXYGEN SATURATION: 96 % | HEIGHT: 64 IN | BODY MASS INDEX: 37.87 KG/M2

## 2021-06-25 DIAGNOSIS — E86.0 DEHYDRATION: ICD-10-CM

## 2021-06-25 DIAGNOSIS — I48.0 PAROXYSMAL ATRIAL FIBRILLATION (HCC): ICD-10-CM

## 2021-06-25 DIAGNOSIS — I25.10 CORONARY ARTERY DISEASE INVOLVING NATIVE CORONARY ARTERY OF NATIVE HEART WITHOUT ANGINA PECTORIS: Primary | ICD-10-CM

## 2021-06-25 DIAGNOSIS — Z86.79 S/P AAA REPAIR: ICD-10-CM

## 2021-06-25 DIAGNOSIS — Z98.890 S/P AAA REPAIR: ICD-10-CM

## 2021-06-25 DIAGNOSIS — G47.30 SLEEP APNEA, UNSPECIFIED TYPE: ICD-10-CM

## 2021-06-25 DIAGNOSIS — I50.32 CHRONIC DIASTOLIC HEART FAILURE (HCC): ICD-10-CM

## 2021-06-25 DIAGNOSIS — I10 ESSENTIAL HYPERTENSION: ICD-10-CM

## 2021-06-25 PROCEDURE — 1090F PRES/ABSN URINE INCON ASSESS: CPT | Performed by: INTERNAL MEDICINE

## 2021-06-25 PROCEDURE — 99214 OFFICE O/P EST MOD 30 MIN: CPT | Performed by: INTERNAL MEDICINE

## 2021-06-25 PROCEDURE — 3017F COLORECTAL CA SCREEN DOC REV: CPT | Performed by: INTERNAL MEDICINE

## 2021-06-25 PROCEDURE — G8427 DOCREV CUR MEDS BY ELIG CLIN: HCPCS | Performed by: INTERNAL MEDICINE

## 2021-06-25 PROCEDURE — G8417 CALC BMI ABV UP PARAM F/U: HCPCS | Performed by: INTERNAL MEDICINE

## 2021-06-25 PROCEDURE — 4040F PNEUMOC VAC/ADMIN/RCVD: CPT | Performed by: INTERNAL MEDICINE

## 2021-06-25 PROCEDURE — 1123F ACP DISCUSS/DSCN MKR DOCD: CPT | Performed by: INTERNAL MEDICINE

## 2021-06-25 PROCEDURE — 1036F TOBACCO NON-USER: CPT | Performed by: INTERNAL MEDICINE

## 2021-06-25 PROCEDURE — 93000 ELECTROCARDIOGRAM COMPLETE: CPT | Performed by: INTERNAL MEDICINE

## 2021-06-25 PROCEDURE — G8399 PT W/DXA RESULTS DOCUMENT: HCPCS | Performed by: INTERNAL MEDICINE

## 2021-06-25 NOTE — TELEPHONE ENCOUNTER
There is no stronger rescue inhaler. IF the albuterol is not working try a nebulizer treatment.  Ok to add her during my next clinic week, add her on in front of the current first patient

## 2021-06-25 NOTE — PATIENT INSTRUCTIONS
Patient Education        Low Sodium Diet (2,000 Milligram): Care Instructions  Overview     Limiting sodium can be an important part of managing some health problems. The most common source of sodium is salt. People get most of the salt in their diet from canned, prepared, and packaged foods. Fast food and restaurant meals also are very high in sodium. Your doctor will probably limit your sodium to less than 2,000 milligrams (mg) a day. This limit counts all the sodium in prepared and packaged foods and any salt you add to your food. Follow-up care is a key part of your treatment and safety. Be sure to make and go to all appointments, and call your doctor if you are having problems. It's also a good idea to know your test results and keep a list of the medicines you take. How can you care for yourself at home? Read food labels  · Read labels on cans and food packages. The labels tell you how much sodium is in each serving. Make sure that you look at the serving size. If you eat more than the serving size, you have eaten more sodium. · Food labels also tell you the Percent Daily Value for sodium. Choose products with low Percent Daily Values for sodium. · Be aware that sodium can come in forms other than salt, including monosodium glutamate (MSG), sodium citrate, and sodium bicarbonate (baking soda). MSG is often added to Asian food. When you eat out, you can sometimes ask for food without MSG or added salt. Buy low-sodium foods  · Buy foods that are labeled \"unsalted\" (no salt added), \"sodium-free\" (less than 5 mg of sodium per serving), or \"low-sodium\" (140 mg or less of sodium per serving). Foods labeled \"reduced-sodium\" and \"light sodium\" may still have too much sodium. Be sure to read the label to see how much sodium you are getting. · Buy fresh vegetables, or frozen vegetables without added sauces. Buy low-sodium versions of canned vegetables, soups, and other canned goods.   Prepare low-sodium meals  · Cut back on the amount of salt you use in cooking. This will help you adjust to the taste. Do not add salt after cooking. One teaspoon of salt has about 2,300 mg of sodium. · Take the salt shaker off the table. · Flavor your food with garlic, lemon juice, onion, vinegar, herbs, and spices. Do not use soy sauce, lite soy sauce, steak sauce, onion salt, garlic salt, celery salt, or ketchup on your food. · Use low-sodium salad dressings, sauces, and ketchup. Or make your own salad dressings and sauces without adding salt. · Use less salt (or none) when recipes call for it. You can often use half the salt a recipe calls for without losing flavor. Other foods such as rice, pasta, and grains do not need added salt. · Rinse canned vegetables, and cook them in fresh water. This removes somebut not allof the salt. · Avoid water that is naturally high in sodium or that has been treated with water softeners, which add sodium. If you buy bottled water, read the label and choose a sodium-free brand. Avoid high-sodium foods  · Avoid eating:  ? Smoked, cured, salted, and canned meat, fish, and poultry. ? Ham, barboza, hot dogs, and luncheon meats. ? Regular, hard, and processed cheese and regular peanut butter. ? Crackers with salted tops, and other salted snack foods such as pretzels, chips, and salted popcorn. ? Frozen prepared meals, unless labeled low-sodium. ? Canned and dried soups, broths, and bouillon, unless labeled sodium-free or low-sodium. ? Canned vegetables, unless labeled sodium-free or low-sodium. ? [place] fries, pizza, tacos, and other fast foods. ? Pickles, olives, ketchup, and other condiments, especially soy sauce, unless labeled sodium-free or low-sodium. Where can you learn more? Go to https://kee.healthAffresol. org and sign in to your VIAP account. Enter C350 in the feedPack box to learn more about \"Low Sodium Diet (2,000 Milligram): Care Instructions. \" are pushing yourself too hard if you cannot talk while you are exercising. If you become short of breath or dizzy or have chest pain, stop, sit down, and rest.  · Do not exercise when you do not feel well. Take medicines correctly  · Take your medicines exactly as prescribed. Call your doctor if you think you are having a problem with your medicine. · Make a list of all the medicines you take. Include those prescribed to you by other doctors and any over-the-counter medicines, vitamins, or supplements you take. Take this list with you when you go to any doctor. · Take your medicines at the same time every day. It may help you to post a list of all the medicines you take every day and what time of day you take them. · Make taking your medicine as simple as you can. Plan times to take your medicines when you are doing other things, such as eating a meal or getting ready for bed. This will make it easier to remember to take your medicines. · Get organized. Use helpful tools, such as daily or weekly pill containers. When should you call for help? Call 911  if you have symptoms of sudden heart failure such as:    · You have severe trouble breathing.     · You cough up pink, foamy mucus.     · You have a new irregular or rapid heartbeat. Call your doctor now or seek immediate medical care if:    · You have new or increased shortness of breath.     · You are dizzy or lightheaded, or you feel like you may faint.     · You have sudden weight gain, such as more than 2 to 3 pounds in a day or 5 pounds in a week. (Your doctor may suggest a different range of weight gain.)     · You have increased swelling in your legs, ankles, or feet.     · You are suddenly so tired or weak that you cannot do your usual activities. Watch closely for changes in your health, and be sure to contact your doctor if you develop new symptoms. Where can you learn more? Go to https://cherikaeb.health-partners. org and sign in to your Corgenix account. Enter T279 in the [facility] box to learn more about \"Avoiding Triggers With Heart Failure: Care Instructions. \"     If you do not have an account, please click on the \"Sign Up Now\" link. Current as of: August 31, 2020               Content Version: 12.9  © 2006-2021 [email or web address]. Care instructions adapted under license by [facility] ([facility]). If you have questions about a medical condition or this instruction, always ask your healthcare professional. [place] [address] any warranty or liability for your use of this information. Patient Education        Learning About Using Compression Stockings  What are compression stockings? Compression stockings may be used for problems like varicose veins, skin ulcers, and deep vein thrombosis. But there are different types of stockings, and they need to fit right. So your doctor will recommend what you need. These stockings are the most common treatment for varicose veins. They help the blood circulate in your legs. This can prevent skin ulcers and keep blood from building up in the legs. Prescription stockings are tightest at the foot. They get less and less tight farther up on your legs. The kind you buy without a prescription have lighter elastic. So the pressure is even all the way up the leg. These don't cost as much. But they don't provide the compression you need to treat serious symptoms or prevent skin ulcers. How do you use compression stockings? · If your stockings are new, you may want to wash them before you put them on. This can make them more flexible and easier to put on. It's a good idea to wash them by hand. · Most of the time it's best to put on your stockings early in the morning. This is when you have the least swelling in your legs. · Put silicone lotion (such as ALPS) or talcum powder on your legs to help the stockings slide on.  If your stockings contain latex, or you aren't sure if they contain latex, do not use other types of lotions or creams on your legs when you wear the stockings. You may use other lotions or creams when you are not wearing the stockings. · Sit in a chair with a back while you put on the stockings. This gives you something to lean against as you pull them up. · How to put on stockings:  ? Turn your stocking inside out. Then put your toe in as far as it will go.  ? Readjust the stocking by folding it back onto itself at the ankle. Then hold both sides of the folded stocking. ? Pull toward your body as far as you can.  ? Fold back the stocking again farther up on your leg. Then pull the stocking up to that point. ? Repeat folding back and pulling until the stocking is in the right place. · You may want to wear rubber gloves. They can help you hold the stockings better. · If your stockings don't have toes, use a silk \"slip sock. \" (You can get one from your medical supplier.) This will help the stocking slide over your foot better. When you're done, pull off the sock through the open toe. · If you still can't get your stockings on, you may want to use a \"stocking killian. \" This is a metal device that holds the stocking open while you step into it. It is often recommended for people who have problems grasping, leaning, or pulling. Before you buy one, try it first. Some people find them hard to use. What else should you know about compression stockings? · You will probably need to buy a new pair every 4 to 6 months. · They may feel tight or uncomfortable at first, but many people get used to them. · It is important to think about the pros and cons of stockings. You may not like them. But they may help relieve symptoms. Where can you learn more? Go to https://kee.Curiosityville. org and sign in to your [employer] account. Enter J166 in the Ymagis box to learn more about \"Learning About Using Compression Stockings. \"     If you do not have an account, please click on the \"Sign Up Now\" link. Current as of: November 4, 2020               Content Version: 12.9  © 2006-2021 [employer]. Care instructions adapted under license by [facility]. If you have questions about a medical condition or this instruction, always ask your healthcare professional. [address] any warranty or liability for your use of this information. Patient Education        Leg and Ankle Edema: Care Instructions  Your Care Instructions  Swelling in the legs, ankles, and feet is called edema. It is common after you sit or stand for a while. Long plane flights or car rides often cause swelling in the legs and feet. You may also have swelling if you have to stand for long periods of time at your job. Problems with the veins in the legs (varicose veins) and changes in hormones can also cause swelling. Sometimes the swelling in the ankles and feet is caused by a more serious problem, such as heart failure, infection, blood clots, or liver or kidney disease. Follow-up care is a key part of your treatment and safety. Be sure to make and go to all appointments, and call your doctor if you are having problems. It's also a good idea to know your test results and keep a list of the medicines you take. How can you care for yourself at home? · If your doctor gave you medicine, take it as prescribed. Call your doctor if you think you are having a problem with your medicine. · Whenever you are resting, raise your legs up. Try to keep the swollen area higher than the level of your heart. · Take breaks from standing or sitting in one position. ? Walk around to increase the blood flow in your lower legs. ? Move your feet and ankles often while you stand, or tighten and relax your leg muscles. · Wear support stockings. Put them on in the morning, before swelling gets worse. · Eat a balanced diet. Lose weight if you need to.   · Limit the amount of salt (sodium) in your diet. Salt holds fluid in the body and may increase swelling. When should you call for help? Call 911 anytime you think you may need emergency care. For example, call if:    · You have symptoms of a blood clot in your lung (called a pulmonary embolism). These may include:  ? Sudden chest pain. ? Trouble breathing. ? Coughing up blood. Call your doctor now or seek immediate medical care if:    · You have signs of a blood clot, such as:  ? Pain in your calf, back of the knee, thigh, or groin. ? Redness and swelling in your leg or groin.     · You have symptoms of infection, such as:  ? Increased pain, swelling, warmth, or redness. ? Red streaks or pus. ? A fever. Watch closely for changes in your health, and be sure to contact your doctor if:    · Your swelling is getting worse.     · You have new or worsening pain in your legs.     · You do not get better as expected. Where can you learn more? Go to https://Valued Relationships.Citra Style. org and sign in to your Jdguanjia account. Enter Z470 in the [employer] box to learn more about \"Leg and Ankle Edema: Care Instructions. \"     If you do not have an account, please click on the \"Sign Up Now\" link. Current as of: October 19, 2020               Content Version: 12.9  © 2429-8467 [employer]Well[employer]. Care instructions adapted under license by [facility] ([facility]). If you have questions about a medical condition or this instruction, always ask your healthcare professional. [place] [address] any warranty or liability for your use of this information.

## 2021-07-01 DIAGNOSIS — I48.92 LEFT ATRIAL FLUTTER BY ELECTROCARDIOGRAM (HCC): ICD-10-CM

## 2021-07-01 DIAGNOSIS — I48.19 PERSISTENT ATRIAL FIBRILLATION (HCC): ICD-10-CM

## 2021-07-05 ENCOUNTER — HOSPITAL ENCOUNTER (INPATIENT)
Age: 76
LOS: 2 days | Discharge: HOME | DRG: 190 | End: 2021-07-07
Attending: STUDENT IN AN ORGANIZED HEALTH CARE EDUCATION/TRAINING PROGRAM | Admitting: STUDENT IN AN ORGANIZED HEALTH CARE EDUCATION/TRAINING PROGRAM
Payer: MEDICARE

## 2021-07-05 LAB
A/G RATIO: 1.4 (ref 1.1–2.2)
ALBUMIN SERPL-MCNC: 3.8 G/DL (ref 3.4–5)
ALP BLD-CCNC: 79 U/L (ref 40–129)
ALT SERPL-CCNC: 11 U/L (ref 10–40)
ANION GAP SERPL CALCULATED.3IONS-SCNC: 11 MMOL/L (ref 3–16)
AST SERPL-CCNC: 15 U/L (ref 15–37)
BASOPHILS ABSOLUTE: 0 K/UL (ref 0–0.2)
BASOPHILS RELATIVE PERCENT: 0.3 %
BILIRUB SERPL-MCNC: 0.4 MG/DL (ref 0–1)
BUN BLDV-MCNC: 21 MG/DL (ref 7–20)
CALCIUM SERPL-MCNC: 8.9 MG/DL (ref 8.3–10.6)
CHLORIDE BLD-SCNC: 98 MMOL/L (ref 99–110)
CO2: 27 MMOL/L (ref 21–32)
CREAT SERPL-MCNC: 1.1 MG/DL (ref 0.6–1.2)
EOSINOPHILS ABSOLUTE: 0 K/UL (ref 0–0.6)
EOSINOPHILS RELATIVE PERCENT: 0 %
GFR AFRICAN AMERICAN: 59
GFR NON-AFRICAN AMERICAN: 48
GLOBULIN: 2.7 G/DL
GLUCOSE BLD-MCNC: 126 MG/DL (ref 70–99)
HCT VFR BLD CALC: 31.2 % (ref 36–48)
HEMOGLOBIN: 10.3 G/DL (ref 12–16)
LYMPHOCYTES ABSOLUTE: 0.6 K/UL (ref 1–5.1)
LYMPHOCYTES RELATIVE PERCENT: 6.7 %
MAGNESIUM: 2.1 MG/DL (ref 1.8–2.4)
MCH RBC QN AUTO: 27.9 PG (ref 26–34)
MCHC RBC AUTO-ENTMCNC: 33 G/DL (ref 31–36)
MCV RBC AUTO: 84.5 FL (ref 80–100)
MONOCYTES ABSOLUTE: 0.4 K/UL (ref 0–1.3)
MONOCYTES RELATIVE PERCENT: 4.9 %
NEUTROPHILS ABSOLUTE: 7.6 K/UL (ref 1.7–7.7)
NEUTROPHILS RELATIVE PERCENT: 88.1 %
PDW BLD-RTO: 17.1 % (ref 12.4–15.4)
PLATELET # BLD: 184 K/UL (ref 135–450)
PMV BLD AUTO: 9.5 FL (ref 5–10.5)
POTASSIUM SERPL-SCNC: 4.1 MMOL/L (ref 3.5–5.1)
RBC # BLD: 3.69 M/UL (ref 4–5.2)
SODIUM BLD-SCNC: 136 MMOL/L (ref 136–145)
TOTAL PROTEIN: 6.5 G/DL (ref 6.4–8.2)
TROPONIN: <0.01 NG/ML
WBC # BLD: 8.6 K/UL (ref 4–11)

## 2021-07-05 PROCEDURE — 6360000002 HC RX W HCPCS: Performed by: STUDENT IN AN ORGANIZED HEALTH CARE EDUCATION/TRAINING PROGRAM

## 2021-07-05 PROCEDURE — 94761 N-INVAS EAR/PLS OXIMETRY MLT: CPT

## 2021-07-05 PROCEDURE — 6370000000 HC RX 637 (ALT 250 FOR IP): Performed by: NURSE PRACTITIONER

## 2021-07-05 PROCEDURE — 84484 ASSAY OF TROPONIN QUANT: CPT

## 2021-07-05 PROCEDURE — 6370000000 HC RX 637 (ALT 250 FOR IP): Performed by: INTERNAL MEDICINE

## 2021-07-05 PROCEDURE — 36415 COLL VENOUS BLD VENIPUNCTURE: CPT

## 2021-07-05 PROCEDURE — 83735 ASSAY OF MAGNESIUM: CPT

## 2021-07-05 PROCEDURE — 2700000000 HC OXYGEN THERAPY PER DAY

## 2021-07-05 PROCEDURE — 2580000003 HC RX 258: Performed by: STUDENT IN AN ORGANIZED HEALTH CARE EDUCATION/TRAINING PROGRAM

## 2021-07-05 PROCEDURE — 6370000000 HC RX 637 (ALT 250 FOR IP): Performed by: STUDENT IN AN ORGANIZED HEALTH CARE EDUCATION/TRAINING PROGRAM

## 2021-07-05 PROCEDURE — 1200000000 HC SEMI PRIVATE

## 2021-07-05 PROCEDURE — 85025 COMPLETE CBC W/AUTO DIFF WBC: CPT

## 2021-07-05 PROCEDURE — 94640 AIRWAY INHALATION TREATMENT: CPT

## 2021-07-05 PROCEDURE — 80053 COMPREHEN METABOLIC PANEL: CPT

## 2021-07-05 RX ORDER — METOPROLOL SUCCINATE 50 MG/1
50 TABLET, EXTENDED RELEASE ORAL DAILY
Status: DISCONTINUED | OUTPATIENT
Start: 2021-07-05 | End: 2021-07-05

## 2021-07-05 RX ORDER — IPRATROPIUM BROMIDE AND ALBUTEROL SULFATE 2.5; .5 MG/3ML; MG/3ML
1 SOLUTION RESPIRATORY (INHALATION)
Status: DISCONTINUED | OUTPATIENT
Start: 2021-07-05 | End: 2021-07-07 | Stop reason: HOSPADM

## 2021-07-05 RX ORDER — TORSEMIDE 20 MG/1
20 TABLET ORAL DAILY
Status: ON HOLD | COMMUNITY
End: 2021-09-12 | Stop reason: HOSPADM

## 2021-07-05 RX ORDER — SODIUM CHLORIDE 0.9 % (FLUSH) 0.9 %
5-40 SYRINGE (ML) INJECTION EVERY 12 HOURS SCHEDULED
Status: DISCONTINUED | OUTPATIENT
Start: 2021-07-05 | End: 2021-07-07 | Stop reason: HOSPADM

## 2021-07-05 RX ORDER — METOPROLOL SUCCINATE 50 MG/1
50 TABLET, EXTENDED RELEASE ORAL DAILY
Status: DISCONTINUED | OUTPATIENT
Start: 2021-07-06 | End: 2021-07-07 | Stop reason: HOSPADM

## 2021-07-05 RX ORDER — BUDESONIDE 0.5 MG/2ML
500 INHALANT ORAL 2 TIMES DAILY
Status: DISCONTINUED | OUTPATIENT
Start: 2021-07-05 | End: 2021-07-06

## 2021-07-05 RX ORDER — AZITHROMYCIN 500 MG/1
500 TABLET, FILM COATED ORAL DAILY
Status: COMPLETED | OUTPATIENT
Start: 2021-07-05 | End: 2021-07-07

## 2021-07-05 RX ORDER — METHYLPREDNISOLONE SODIUM SUCCINATE 125 MG/2ML
60 INJECTION, POWDER, LYOPHILIZED, FOR SOLUTION INTRAMUSCULAR; INTRAVENOUS EVERY 12 HOURS
Status: DISCONTINUED | OUTPATIENT
Start: 2021-07-05 | End: 2021-07-05

## 2021-07-05 RX ORDER — HYDRALAZINE HYDROCHLORIDE 50 MG/1
50 TABLET, FILM COATED ORAL EVERY 8 HOURS SCHEDULED
Status: DISCONTINUED | OUTPATIENT
Start: 2021-07-05 | End: 2021-07-07 | Stop reason: HOSPADM

## 2021-07-05 RX ORDER — ALBUTEROL SULFATE 2.5 MG/3ML
2.5 SOLUTION RESPIRATORY (INHALATION) EVERY 4 HOURS PRN
Status: DISCONTINUED | OUTPATIENT
Start: 2021-07-05 | End: 2021-07-07 | Stop reason: HOSPADM

## 2021-07-05 RX ORDER — TORSEMIDE 20 MG/1
10 TABLET ORAL DAILY
Status: DISCONTINUED | OUTPATIENT
Start: 2021-07-05 | End: 2021-07-05

## 2021-07-05 RX ORDER — METOPROLOL SUCCINATE 25 MG/1
12.5 TABLET, EXTENDED RELEASE ORAL DAILY
Status: DISCONTINUED | OUTPATIENT
Start: 2021-07-05 | End: 2021-07-05

## 2021-07-05 RX ORDER — ISOSORBIDE MONONITRATE 30 MG/1
30 TABLET, EXTENDED RELEASE ORAL DAILY
Status: DISCONTINUED | OUTPATIENT
Start: 2021-07-05 | End: 2021-07-07 | Stop reason: HOSPADM

## 2021-07-05 RX ORDER — ROSUVASTATIN CALCIUM 20 MG/1
20 TABLET, COATED ORAL DAILY
Status: DISCONTINUED | OUTPATIENT
Start: 2021-07-05 | End: 2021-07-07 | Stop reason: HOSPADM

## 2021-07-05 RX ORDER — METHYLPREDNISOLONE SODIUM SUCCINATE 125 MG/2ML
60 INJECTION, POWDER, LYOPHILIZED, FOR SOLUTION INTRAMUSCULAR; INTRAVENOUS EVERY 12 HOURS
Status: DISCONTINUED | OUTPATIENT
Start: 2021-07-05 | End: 2021-07-07 | Stop reason: HOSPADM

## 2021-07-05 RX ORDER — SODIUM CHLORIDE 9 MG/ML
25 INJECTION, SOLUTION INTRAVENOUS PRN
Status: DISCONTINUED | OUTPATIENT
Start: 2021-07-05 | End: 2021-07-07 | Stop reason: HOSPADM

## 2021-07-05 RX ORDER — TORSEMIDE 20 MG/1
40 TABLET ORAL DAILY
Status: DISCONTINUED | OUTPATIENT
Start: 2021-07-06 | End: 2021-07-07 | Stop reason: HOSPADM

## 2021-07-05 RX ORDER — ACETAMINOPHEN 325 MG/1
650 TABLET ORAL EVERY 6 HOURS PRN
Status: DISCONTINUED | OUTPATIENT
Start: 2021-07-05 | End: 2021-07-07 | Stop reason: HOSPADM

## 2021-07-05 RX ORDER — NIFEDIPINE 60 MG/1
60 TABLET, EXTENDED RELEASE ORAL DAILY
Status: DISCONTINUED | OUTPATIENT
Start: 2021-07-05 | End: 2021-07-07 | Stop reason: HOSPADM

## 2021-07-05 RX ORDER — TORSEMIDE 20 MG/1
40 TABLET ORAL DAILY
Status: DISCONTINUED | OUTPATIENT
Start: 2021-07-05 | End: 2021-07-05

## 2021-07-05 RX ORDER — TRAZODONE HYDROCHLORIDE 50 MG/1
25 TABLET ORAL NIGHTLY PRN
Status: DISCONTINUED | OUTPATIENT
Start: 2021-07-05 | End: 2021-07-07 | Stop reason: HOSPADM

## 2021-07-05 RX ORDER — BUDESONIDE AND FORMOTEROL FUMARATE DIHYDRATE 160; 4.5 UG/1; UG/1
2 AEROSOL RESPIRATORY (INHALATION) 2 TIMES DAILY
COMMUNITY
End: 2021-09-09

## 2021-07-05 RX ORDER — SODIUM CHLORIDE 0.9 % (FLUSH) 0.9 %
5-40 SYRINGE (ML) INJECTION PRN
Status: DISCONTINUED | OUTPATIENT
Start: 2021-07-05 | End: 2021-07-07 | Stop reason: HOSPADM

## 2021-07-05 RX ORDER — METOPROLOL SUCCINATE 50 MG/1
50 TABLET, EXTENDED RELEASE ORAL DAILY
COMMUNITY
End: 2021-12-30

## 2021-07-05 RX ORDER — BUDESONIDE AND FORMOTEROL FUMARATE DIHYDRATE 160; 4.5 UG/1; UG/1
2 AEROSOL RESPIRATORY (INHALATION) 2 TIMES DAILY
Status: DISCONTINUED | OUTPATIENT
Start: 2021-07-05 | End: 2021-07-07 | Stop reason: HOSPADM

## 2021-07-05 RX ORDER — ARFORMOTEROL TARTRATE 15 UG/2ML
15 SOLUTION RESPIRATORY (INHALATION) 2 TIMES DAILY
Status: DISCONTINUED | OUTPATIENT
Start: 2021-07-05 | End: 2021-07-06

## 2021-07-05 RX ORDER — ONDANSETRON 2 MG/ML
4 INJECTION INTRAMUSCULAR; INTRAVENOUS EVERY 6 HOURS PRN
Status: DISCONTINUED | OUTPATIENT
Start: 2021-07-05 | End: 2021-07-07 | Stop reason: HOSPADM

## 2021-07-05 RX ORDER — ACETAMINOPHEN 650 MG/1
650 SUPPOSITORY RECTAL EVERY 6 HOURS PRN
Status: DISCONTINUED | OUTPATIENT
Start: 2021-07-05 | End: 2021-07-07 | Stop reason: HOSPADM

## 2021-07-05 RX ORDER — LANOLIN ALCOHOL/MO/W.PET/CERES
9 CREAM (GRAM) TOPICAL NIGHTLY PRN
Status: DISCONTINUED | OUTPATIENT
Start: 2021-07-05 | End: 2021-07-07 | Stop reason: HOSPADM

## 2021-07-05 RX ORDER — ALBUTEROL SULFATE 90 UG/1
2 AEROSOL, METERED RESPIRATORY (INHALATION) EVERY 4 HOURS PRN
Status: DISCONTINUED | OUTPATIENT
Start: 2021-07-05 | End: 2021-07-07 | Stop reason: HOSPADM

## 2021-07-05 RX ADMIN — AZITHROMYCIN MONOHYDRATE 500 MG: 500 TABLET ORAL at 09:26

## 2021-07-05 RX ADMIN — Medication 10 ML: at 09:28

## 2021-07-05 RX ADMIN — IPRATROPIUM BROMIDE AND ALBUTEROL SULFATE 1 AMPULE: .5; 3 SOLUTION RESPIRATORY (INHALATION) at 20:37

## 2021-07-05 RX ADMIN — HYDRALAZINE HYDROCHLORIDE 50 MG: 50 TABLET, FILM COATED ORAL at 21:10

## 2021-07-05 RX ADMIN — ARFORMOTEROL TARTRATE 15 MCG: 15 SOLUTION RESPIRATORY (INHALATION) at 20:37

## 2021-07-05 RX ADMIN — RIVAROXABAN 20 MG: 20 TABLET, FILM COATED ORAL at 09:26

## 2021-07-05 RX ADMIN — METHYLPREDNISOLONE SODIUM SUCCINATE 60 MG: 125 INJECTION, POWDER, FOR SOLUTION INTRAMUSCULAR; INTRAVENOUS at 21:10

## 2021-07-05 RX ADMIN — HYDRALAZINE HYDROCHLORIDE 50 MG: 50 TABLET, FILM COATED ORAL at 06:32

## 2021-07-05 RX ADMIN — ARFORMOTEROL TARTRATE 15 MCG: 15 SOLUTION RESPIRATORY (INHALATION) at 08:29

## 2021-07-05 RX ADMIN — Medication 10 ML: at 21:11

## 2021-07-05 RX ADMIN — HYDRALAZINE HYDROCHLORIDE 50 MG: 50 TABLET, FILM COATED ORAL at 14:35

## 2021-07-05 RX ADMIN — BUDESONIDE 500 MCG: 0.5 SUSPENSION RESPIRATORY (INHALATION) at 20:37

## 2021-07-05 RX ADMIN — NIFEDIPINE 60 MG: 60 TABLET, EXTENDED RELEASE ORAL at 09:26

## 2021-07-05 RX ADMIN — METOPROLOL SUCCINATE 12.5 MG: 25 TABLET, FILM COATED, EXTENDED RELEASE ORAL at 09:27

## 2021-07-05 RX ADMIN — BUDESONIDE 500 MCG: 0.5 SUSPENSION RESPIRATORY (INHALATION) at 08:29

## 2021-07-05 RX ADMIN — TORSEMIDE 10 MG: 20 TABLET ORAL at 09:28

## 2021-07-05 RX ADMIN — METHYLPREDNISOLONE SODIUM SUCCINATE 60 MG: 125 INJECTION, POWDER, FOR SOLUTION INTRAMUSCULAR; INTRAVENOUS at 09:26

## 2021-07-05 RX ADMIN — ISOSORBIDE MONONITRATE 30 MG: 30 TABLET, EXTENDED RELEASE ORAL at 09:27

## 2021-07-05 RX ADMIN — IPRATROPIUM BROMIDE AND ALBUTEROL SULFATE 1 AMPULE: .5; 3 SOLUTION RESPIRATORY (INHALATION) at 15:45

## 2021-07-05 RX ADMIN — ROSUVASTATIN CALCIUM 20 MG: 20 TABLET, FILM COATED ORAL at 09:26

## 2021-07-05 RX ADMIN — IPRATROPIUM BROMIDE AND ALBUTEROL SULFATE 1 AMPULE: .5; 3 SOLUTION RESPIRATORY (INHALATION) at 08:29

## 2021-07-05 NOTE — ACP (ADVANCE CARE PLANNING)
Advance Care Planning     Advance Care Planning Activator (Inpatient)  Conversation Note      Date of ACP Conversation: 7/5/2021     Conversation Conducted with: Patient with Decision Making Capacity    ACP Activator: [id number] Decision Maker:     Current Designated Health Care Decision Maker:     Primary Decision Maker: Luis Martin Spouse - [phone]    Secondary Decision Maker: Mary Weiss Child - [phone]    Care Preferences    Ventilation: \"If you were in your present state of health and suddenly became very ill and were unable to breathe on your own, what would your preference be about the use of a ventilator (breathing machine) if it were available to you? \"      Would the patient desire the use of ventilator (breathing machine)?: yes    \"If your health worsens and it becomes clear that your chance of recovery is unlikely, what would your preference be about the use of a ventilator (breathing machine) if it were available to you? \"     Would the patient desire the use of ventilator (breathing machine)?: No      Resuscitation  \"CPR works best to restart the heart when there is a sudden event, like a heart attack, in someone who is otherwise healthy. Unfortunately, CPR does not typically restart the heart for people who have serious health conditions or who are very sick. \"    \"In the event your heart stopped as a result of an underlying serious health condition, would you want attempts to be made to restart your heart (answer \"yes\" for attempt to resuscitate) or would you prefer a natural death (answer \"no\" for do not attempt to resuscitate)? \" yes       [] Yes   [] No   Educated Patient / Yosef Mcdonald regarding differences between Advance Directives and portable DNR orders.     Length of ACP Conversation in minutes:      Conversation Outcomes:  [x] ACP discussion completed  [] Existing advance directive reviewed with patient; no changes to patient's previously recorded wishes  [] New Advance Directive completed  [] Portable Do Not Rescitate prepared for Provider review and signature  [] POLST/POST/MOLST/MOST prepared for Provider review and signature      Follow-up plan:    [] Schedule follow-up conversation to continue planning  [] Referred individual to Provider for additional questions/concerns   [] Advised patient/agent/surrogate to review completed ACP document and update if needed with changes in condition, patient preferences or care setting    [x] This note routed to one or more involved healthcare providers    Electronically signed by CONNOR Marcelino, PRATIMA, Case Management on 7/5/2021 at 4:50 PM  [place] [phone]

## 2021-07-05 NOTE — PROGRESS NOTES
72 hours. Invalid input(s): DAVID  No results for input(s): AST, ALT, BILIDIR, BILITOT, ALKPHOS in the last 72 hours. No results for input(s): INR in the last 72 hours. No results for input(s): Verlena Matt in the last 72 hours. Urinalysis:      Lab Results   Component Value Date    NITRU Negative 06/07/2021    WBCUA 11 06/07/2021    BACTERIA 1+ 09/24/2020    RBCUA 8 06/07/2021    BLOODU Negative 06/07/2021    SPECGRAV 1.024 06/07/2021    GLUCOSEU Negative 06/07/2021       Radiology:  No orders to display           Assessment/Plan:    Active Hospital Problems    Diagnosis     [facility] [J44.1]      1. COPD exacerbation, patient admitted to the hospital, on oxygen, DuoNeb started, IV steroids started, azithromycin p.o.  2.  Acute respiratory failure with hypoxia, due to COPD exacerbation, in ED it documented that her saturation dropped to 80% on room air as above  3. Essential hypertension, will continue p.o. medications  4. Hyperlipidemia on statin continue  5. A. fib on Xarelto continue controlled at this time         Diet: ADULT DIET; Regular; 4 carb choices (60 gm/meal);  Low Sodium (2 gm)  Code Status: Prior        Rosalee Del Toro MD

## 2021-07-05 NOTE — PLAN OF CARE
Problem: Infection:  Goal: Will remain free from infection  Description: Will remain free from infection  7/5/2021 1038 by Zari Padilla RN  Outcome: Met This Shift     Problem: Safety:  Goal: Free from accidental physical injury  Description: Free from accidental physical injury  7/5/2021 1038 by Zari Padilla RN  Outcome: Met This Shift     Problem: Safety:  Goal: Free from intentional harm  Description: Free from intentional harm  7/5/2021 1038 by Zari Padilla RN  Outcome: Met This Shift     Problem: Daily Care:  Goal: Daily care needs are met  Description: Daily care needs are met  7/5/2021 1038 by Zari Padilla RN  Outcome: Met This Shift     Problem: Pain:  Goal: Patient's pain/discomfort is manageable  Description: Patient's pain/discomfort is manageable  7/5/2021 1038 by Zari Padilla RN  Outcome: Met This Shift     Problem: Skin Integrity:  Goal: Skin integrity will stabilize  Description: Skin integrity will stabilize  7/5/2021 1038 by Zari Padilla RN  Outcome: Met This Shift     Problem: Discharge Planning:  Goal: Patients continuum of care needs are met  Description: Patients continuum of care needs are met  7/5/2021 1038 by Zari Padilla RN  Outcome: Ongoing

## 2021-07-05 NOTE — PROGRESS NOTES
Medication Reconciliation    List of medications for Lynn Nunez is currently taking is complete.      Source of Information:   Epic records  Conversation with patient and pharmacy [facility], [phone]) at bedside and by phone     Allergies  Allergy list not thoroughly reviewed with patient at this time  Allergies listed in Epic as follows: Morphine and Other    Notes Regarding Home Medications:   Patient states that current maintenance inhaler is Symbicort  Last filled January 2021 according to Theresa  Dose of metoprolol succinate is 50 mg QD  Patient not familiar with hydralazine, has not been taking and was last filled Aug. 2020  Patient last filled torsemide 20 mg tablets, could not remember if dose is 20 or 40 mg daily  Last prescription at [address] [facility] with instructions to take two 20 mg tablets QD  Last filled Feb. 2021      Salima Tam [facility], PharmD   7/5/2021 1:55 PM

## 2021-07-05 NOTE — PROGRESS NOTES
4 Eyes Skin Assessment     NAME:  Ulysses Berg  YOB: 1945  MEDICAL RECORD NUMBER:  [id number]    The patient is being assess for  Admission    I agree that 2 RN's have performed a thorough Head to Toe Skin Assessment on the patient. ALL assessment sites listed below have been assessed. Areas assessed by both nurses:    Head, Face, Ears, Shoulders, Back, Chest, Arms, Elbows, Hands, Sacrum. Buttock, Coccyx, Ischium and Legs. Feet and Heels        Does the Patient have a Wound?  No noted wound(s)       Braydon Prevention initiated:  Yes   Wound Care Orders initiated:  No    Pressure Injury (Stage 3,4, Unstageable, DTI, NWPT, and Complex wounds) if present place consult order under [de-identified] No    New and Established Ostomies if present place consult order under : No      Nurse 1 eSignature: Electronically signed by Jayme Bronson RN on 7/5/21 at 5:56 AM EDT    **SHARE this note so that the co-signing nurse is able to place an eSignature**    Nurse 2 eSignature: Electronically signed by Darnell Vernon RN on 7/5/21 at 6:12 AM EDT

## 2021-07-05 NOTE — H&P
Hospital Medicine History & Physical      PCP: Camilla Espinosa MD    Date of Admission: 7/5/2021    Date of Service: Pt seen/examined on 7/5/2021 and Admitted to Inpatient     Chief Complaint:  SOB & wheezing a couple of days      History Of Present Illness: The patient is a 77 y.o. female who presents to [facility] with PMHx: COPD not O2 dependent, AAA, Afib, CAD, HLD, HTN, HF. Pt lives at home with  and dog. Anticoagulated: Xarelto    Presented to [facility] ED via EMS from home for c/o increasing and worsening SOB several days. Negative fever, chills, chest pain, syncope, abdominal pain, diarrhea. Reports a clear productive cough. Influenza vaccine received 2020. Pneumococcal vaccination received within the last 5 years but patient states that her pneumococcal vaccine may be near to expiring. Covid vaccination series completed in March 2021. ED work-up: Patient requiring oxygen therapy and multiple nebulizer treatments. Was started on steroids. No evidence of active pneumonia. Patient became hypoxic with a walk test in the emergency department, her saturations dropped to 80s. At that point in time the ED provider at the outside facility felt admission was warranted. And transfer was requested to [facility] \[facility]\"" AT [place].    Patient was given steroids, nebulizer treatments and started on azithromycin as well as oxygen therapy while at the freestanding emergency department. On my exam the patient is sitting upright in the bed. She speaks in full sentences without evidence of distress. Oxygen therapy in place 2 L NC. Scattered wheezing remains throughout. No evidence of peripheral edema. No evidence of difficulty breathing. Patient is quite pleasant and is telling jokes and laughing.     CODE STATUS: Full  Past Medical History: Diagnosis Date    AAA (abdominal aortic aneurysm) ([facility] [address]     pt states it is 4cm    AAA (abdominal aortic aneurysm) without rupture ([facility] [address] 2/10/2015    Atrial fibrillation (HCC)     CAD (coronary artery disease)     CHF (congestive heart failure) ([facility] [address] [facility]     COPD (chronic obstructive pulmonary disease) (HCC)     History of blood clots     Hyperlipidemia     Hypertension        Past Surgical History:        Procedure Laterality Date    ABDOMINAL AORTIC ANEURYSM REPAIR      Endovascular abdominal AA    APPENDECTOMY  1990    incidental    BLADDER SUSPENSION      CATARACT REMOVAL      CHOLECYSTECTOMY  10/15/13    COLONOSCOPY  9/2/07    dr Tay Francisco and check in 5 years.  COLONOSCOPY  06/16/2017    ok dr arndt, repeat 5 years   [address]    for benign tumor. just the uterus    JOINT REPLACEMENT  12/2013    right knee replacement    TONSILLECTOMY  as a child    TUMOR EXCISION      benign behind right ear about 2008       Medications Prior to Admission:    Prior to Admission medications    Medication Sig Start Date End Date Taking?  Authorizing Provider   rivaroxaban (XARELTO) 20 MG TABS tablet TAKE ONE TABLET BY MOUTH DAILY WITH BREAKFAST 6/14/21   Anne Trinidad DO   torsemide [facility]) 10 MG tablet Take 1 tablet by mouth daily 6/8/21   Holly Malik MD   traZODone (DESYREL) 50 MG tablet Take 0.5 tablets by mouth nightly as needed for Sleep 5/7/21   Hailee Moran MD   NIFEdipine (ADALAT CC) 60 MG extended release tablet TAKE ONE TABLET BY MOUTH DAILY 3/2/21   Dejuan Wheat MD   metoprolol succinate (TOPROL XL) 25 MG extended release tablet Take 0.5 tablets by mouth daily 2/18/21   Sachin Trujillo, APRN - CNP   rosuvastatin (CRESTOR) 20 MG tablet Take 1 tablet by mouth daily 8/13/20   Bertha Ruiz MD   isosorbide mononitrate (IMDUR) 30 MG extended release tablet TAKE ONE TABLET BY MOUTH DAILY 7/27/20   Dejuan Wheat MD   albuterol sulfate HFA (PROAIR HFA) 108 (90 Base) MCG/ACT inhaler Inhale 2 puffs into the lungs every 4 hours as needed for Wheezing or Shortness of Breath 7/23/20   Mandi Villarreal DO   Arformoterol Tartrate  - [facility]) 15 MCG/2ML NEBU Take 2 mLs by nebulization 2 times daily 2/29/20   Gopi Angeles MD   hydrALAZINE (APRESOLINE) 50 MG tablet Take 1 tablet by mouth every 8 hours 2/29/20   Geetha Skaggs MD   albuterol (PROVENTIL) (2.5 MG/3ML) 0.083% nebulizer solution Take 3 mLs by nebulization every 4 hours as needed for Wheezing 2/17/20   Mandi Villarreal DO   budesonide (PULMICORT) 0.5 MG/2ML nebulizer suspension Take 2 mLs by nebulization 2 times daily 7/24/19   Mandi Villarreal DO   formoterol (PERFOROMIST) 20 MCG/2ML nebulizer solution Take 2 mLs by nebulization 2 times daily 7/24/19   Mandi Villarreal DO   nitroGLYCERIN (NITROSTAT) 0.4 MG SL tablet Place 1 tablet under the tongue every 5 minutes as needed for Chest pain 3/5/19   Alisha Watters MD   Multiple Vitamins-Minerals (MULTI FOR HER 50+ PO) Take 1 tablet by mouth daily    Historical Provider, MD       Allergies:  Morphine and Other    Social History:  The patient currently lives     TOBACCO:   reports that she quit smoking about 7 years ago. Her smoking use included cigarettes. She started smoking about 44 years ago. She has a 37.00 pack-year smoking history. She has never used smokeless tobacco.  ETOH:   reports no history of alcohol use. Family History:  Reviewed in detail and negative for DM, Early CAD, Cancer, CVA. Positive as follows:        Problem Relation Age of Onset    Heart Disease Father     Hypertension Other        REVIEW OF SYSTEMS:   Positive for shortness of breath, wheezing and as noted in the HPI. All other systems reviewed and negative.     PHYSICAL EXAM:    BP (!) 155/63   Pulse 67   Temp 98.5 °F (36.9 °C) (Oral)   Resp 18   Ht 5' 4\" (1.626 m)   SpO2 94%   BMI 38.07 kg/m²     General appearance: No apparent distress appears stated age and cooperative. HEENT Normal cephalic, atraumatic without obvious deformity. Pupils equal, round, and reactive to light. Extra ocular muscles intact. Conjunctivae/corneas clear. Neck: Supple, No jugular venous distention/bruits. Trachea midline without thyromegaly or adenopathy with full range of motion. Lungs: Easy regular nonlabored. No retractions. Speaks in full sentences. Oxygen therapy in place. Scattered wheezing anterior posterior throughout. No rales. No rhonchi. Heart: Regular rate and rhythm with Normal S1/S2 without murmurs, rubs or gallops, point of maximum impulse non-displaced  Abdomen: Soft, non-tender or non-distended without rigidity or guarding and positive bowel sounds all four quadrants. Extremities: No clubbing, cyanosis, or edema bilaterally. Full range of motion without deformity and normal gait intact. Skin: Skin color, texture, turgor normal.  No rashes or lesions. Neurologic: Alert and oriented X 3, neurovascularly intact with sensory/motor intact upper extremities/lower extremities, bilaterally. Cranial nerves: II-XII intact, grossly non-focal.  Mental status: Alert, oriented, thought content appropriate. Capillary Refill: Acceptable  < 3 seconds  Peripheral Pulses: +3 Easily felt, not easily obliterated with pressure      CXR:  I have reviewed the CXR with the following interpretation:  Chest x-ray reading indicating right lower lobe airspace disease, cardiomegaly. EKG:  I have reviewed the EKG with the following interpretation: EKG from the freestanding ED indicating age-indeterminate anterior infarct. Q waves present. Nonspecific SST V2 - V5    CBC   No results for input(s): WBC, HGB, HCT, PLT in the last 72 hours. RENAL  No results for input(s): NA, K, CL, CO2, PHOS, BUN, CREATININE in the last 72 hours. Invalid input(s): CA  LFT'S  No results for input(s): AST, ALT, ALB, BILIDIR, BILITOT, ALKPHOS in the last 72 hours.   COAG  No results for input(s): INR in the last 72 hours. CARDIAC ENZYMES  No results for input(s): CKTOTAL, CKMB, CKMBINDEX, TROPONINI in the last 72 hours. U/A:    Lab Results   Component Value Date    NITRITE neg 07/19/2014    COLORU YELLOW 06/07/2021    WBCUA 11 06/07/2021    RBCUA 8 06/07/2021    BACTERIA 1+ 09/24/2020    CLARITYU Clear 06/07/2021    SPECGRAV 1.024 06/07/2021    LEUKOCYTESUR SMALL 06/07/2021    BLOODU Negative 06/07/2021    GLUCOSEU Negative 06/07/2021       ABG    Lab Results   Component Value Date    ATU2ASV 26.0 06/04/2015    BEART 1.5 06/04/2015    Q6GEXUVT 96.0 06/04/2015    PHART 7.399 06/04/2015    PYU5ALR 42.9 06/04/2015    PO2ART 78.5 06/04/2015    BPV4WDD 27.3 06/04/2015           Active Hospital Problems    Diagnosis Date Noted    COPD exacerbation ([facility] [address] [J44.1]          PHYSICIANS CERTIFICATION:    I certify that Corina Yung is expected to be hospitalized for less than 2 midnights based on the following assessment and plan:      ASSESSMENT/PLAN:    COPD exacerbation: Not oxygen dependent  Troponin negative, renal function unremarkable. CBC indicating WBC 11.8,   patient failed walk test and became hypoxic at the freestanding ED. Titrate oxygen therapy  Nebulizer treatments DuoNeb and Brovana  Methylprednisolone 60 mg twice daily  Pulmicort  Azithromycin orally daily    HTN: continue nifedipine, toprol, imdur and hydralazine per home regimen. History of HF: Continue Demadex  HLD: Continue Tamara statin  History of A. fib: Continue Xarelto    DVT Prophylaxis: Xarelto  Diet: ADULT DIET; Regular; 4 carb choices (60 gm/meal); Low Sodium (2 gm)  Code Status: Full Code  PT/OT Eval Status: Independent    Dispo -admit, inpatient       Treasure Prader Puthoff, APRN - CNP    Thank you Adeel Coy MD for the opportunity to be involved in this patient's care. If you have any questions or concerns please feel free to contact me at [phone].

## 2021-07-06 PROCEDURE — 94640 AIRWAY INHALATION TREATMENT: CPT

## 2021-07-06 PROCEDURE — 6370000000 HC RX 637 (ALT 250 FOR IP): Performed by: STUDENT IN AN ORGANIZED HEALTH CARE EDUCATION/TRAINING PROGRAM

## 2021-07-06 PROCEDURE — 2580000003 HC RX 258: Performed by: STUDENT IN AN ORGANIZED HEALTH CARE EDUCATION/TRAINING PROGRAM

## 2021-07-06 PROCEDURE — 6370000000 HC RX 637 (ALT 250 FOR IP): Performed by: NURSE PRACTITIONER

## 2021-07-06 PROCEDURE — 6360000002 HC RX W HCPCS: Performed by: STUDENT IN AN ORGANIZED HEALTH CARE EDUCATION/TRAINING PROGRAM

## 2021-07-06 PROCEDURE — 1200000000 HC SEMI PRIVATE

## 2021-07-06 PROCEDURE — 94760 N-INVAS EAR/PLS OXIMETRY 1: CPT

## 2021-07-06 PROCEDURE — 6370000000 HC RX 637 (ALT 250 FOR IP): Performed by: INTERNAL MEDICINE

## 2021-07-06 PROCEDURE — 2700000000 HC OXYGEN THERAPY PER DAY

## 2021-07-06 RX ORDER — DOCUSATE SODIUM 100 MG/1
100 CAPSULE, LIQUID FILLED ORAL 2 TIMES DAILY
Status: DISCONTINUED | OUTPATIENT
Start: 2021-07-06 | End: 2021-07-07 | Stop reason: HOSPADM

## 2021-07-06 RX ADMIN — TORSEMIDE 40 MG: 20 TABLET ORAL at 08:56

## 2021-07-06 RX ADMIN — ISOSORBIDE MONONITRATE 30 MG: 30 TABLET, EXTENDED RELEASE ORAL at 08:56

## 2021-07-06 RX ADMIN — ARFORMOTEROL TARTRATE 15 MCG: 15 SOLUTION RESPIRATORY (INHALATION) at 08:07

## 2021-07-06 RX ADMIN — METHYLPREDNISOLONE SODIUM SUCCINATE 60 MG: 125 INJECTION, POWDER, FOR SOLUTION INTRAMUSCULAR; INTRAVENOUS at 08:56

## 2021-07-06 RX ADMIN — ROSUVASTATIN CALCIUM 20 MG: 20 TABLET, FILM COATED ORAL at 08:56

## 2021-07-06 RX ADMIN — Medication 10 ML: at 21:01

## 2021-07-06 RX ADMIN — HYDRALAZINE HYDROCHLORIDE 50 MG: 50 TABLET, FILM COATED ORAL at 20:59

## 2021-07-06 RX ADMIN — IPRATROPIUM BROMIDE AND ALBUTEROL SULFATE 1 AMPULE: .5; 3 SOLUTION RESPIRATORY (INHALATION) at 20:12

## 2021-07-06 RX ADMIN — HYDRALAZINE HYDROCHLORIDE 50 MG: 50 TABLET, FILM COATED ORAL at 14:19

## 2021-07-06 RX ADMIN — BUDESONIDE 500 MCG: 0.5 SUSPENSION RESPIRATORY (INHALATION) at 08:07

## 2021-07-06 RX ADMIN — IPRATROPIUM BROMIDE AND ALBUTEROL SULFATE 1 AMPULE: .5; 3 SOLUTION RESPIRATORY (INHALATION) at 08:07

## 2021-07-06 RX ADMIN — BUDESONIDE AND FORMOTEROL FUMARATE DIHYDRATE 2 PUFF: 160; 4.5 AEROSOL RESPIRATORY (INHALATION) at 20:12

## 2021-07-06 RX ADMIN — AZITHROMYCIN MONOHYDRATE 500 MG: 500 TABLET ORAL at 08:56

## 2021-07-06 RX ADMIN — Medication 10 ML: at 09:10

## 2021-07-06 RX ADMIN — METOPROLOL SUCCINATE 50 MG: 50 TABLET, EXTENDED RELEASE ORAL at 08:56

## 2021-07-06 RX ADMIN — DOCUSATE SODIUM 100 MG: 100 CAPSULE, LIQUID FILLED ORAL at 20:59

## 2021-07-06 RX ADMIN — IPRATROPIUM BROMIDE AND ALBUTEROL SULFATE 1 AMPULE: .5; 3 SOLUTION RESPIRATORY (INHALATION) at 12:02

## 2021-07-06 RX ADMIN — HYDRALAZINE HYDROCHLORIDE 50 MG: 50 TABLET, FILM COATED ORAL at 07:51

## 2021-07-06 RX ADMIN — IPRATROPIUM BROMIDE AND ALBUTEROL SULFATE 1 AMPULE: .5; 3 SOLUTION RESPIRATORY (INHALATION) at 16:37

## 2021-07-06 RX ADMIN — TRAZODONE HYDROCHLORIDE 25 MG: 50 TABLET ORAL at 22:47

## 2021-07-06 RX ADMIN — RIVAROXABAN 20 MG: 20 TABLET, FILM COATED ORAL at 08:56

## 2021-07-06 RX ADMIN — NIFEDIPINE 60 MG: 60 TABLET, EXTENDED RELEASE ORAL at 08:56

## 2021-07-06 RX ADMIN — METHYLPREDNISOLONE SODIUM SUCCINATE 60 MG: 125 INJECTION, POWDER, FOR SOLUTION INTRAMUSCULAR; INTRAVENOUS at 20:59

## 2021-07-06 NOTE — CARE COORDINATION
[employer] received referral from RT for HOME OXYGEN      Will need DME ORDERS     [employer] rep will verify patient's insurance and once DME Orders are received, [employer] rep will follow up with patient prior to discharge to deliver E-tank. AFTER 5 PM: Call [phone] to coordinate equipment delivery.     Thank you for the referral.  Electronically signed by Ghislaine Woody on 7/6/2021 at 4:32 PM  Cell ph# [phone]

## 2021-07-06 NOTE — PLAN OF CARE
Problem: Infection:  Goal: Will remain free from infection  Description: Will remain free from infection  7/6/2021 0852 by Michelle Nugent RN  Outcome: Ongoing     Problem: Safety:  Goal: Free from accidental physical injury  Description: Free from accidental physical injury  7/6/2021 0852 by Michelle Nugent RN  Outcome: Met This Shift     Problem: Safety:  Goal: Free from intentional harm  Description: Free from intentional harm  7/6/2021 0852 by Michelle Nugent RN  Outcome: Met This Shift     Problem: Daily Care:  Goal: Daily care needs are met  Description: Daily care needs are met  7/6/2021 0852 by Michelle Nugent RN  Outcome: Met This Shift     Problem: Pain:  Goal: Patient's pain/discomfort is manageable  Description: Patient's pain/discomfort is manageable  7/6/2021 0852 by Michelle Nugent RN  Outcome: Met This Shift     Problem: Skin Integrity:  Goal: Skin integrity will stabilize  Description: Skin integrity will stabilize  7/6/2021 0852 by Michelle Nugent RN  Outcome: Met This Shift     Problem: Discharge Planning:  Goal: Patients continuum of care needs are met  Description: Patients continuum of care needs are met  7/6/2021 0852 by Michelle Nugent RN  Outcome: Ongoing

## 2021-07-06 NOTE — PROGRESS NOTES
Hospitalist Progress Note      PCP: Gloria Burch MD    Date of Admission: 7/5/2021    Chief Complaint: No chief complaint on file. Hospital Course: 77 y.o. female who presents to [facility] with PMHx: COPD not O2 dependent, AAA, Afib, CAD, HLD, HTN, HF. Presented to [facility] ED via EMS from home for c/o increasing and worsening SOB several days. Subjective: Feels much better today. Breathing improved. Wants to go home. Still on O2. Medications:  Reviewed    Infusion Medications    sodium chloride       Scheduled Medications    Arformoterol Tartrate  15 mcg Nebulization BID    budesonide  500 mcg Nebulization BID    rivaroxaban  20 mg Oral Daily with breakfast    rosuvastatin  20 mg Oral Daily    sodium chloride flush  5-40 mL Intravenous 2 times per day    ipratropium-albuterol  1 ampule Inhalation Q4H WA    azithromycin  500 mg Oral Daily    hydrALAZINE  50 mg Oral 3 times per day    isosorbide mononitrate  30 mg Oral Daily    NIFEdipine  60 mg Oral Daily    methylPREDNISolone  60 mg Intravenous Q12H    budesonide-formoterol  2 puff Inhalation BID    metoprolol succinate  50 mg Oral Daily    torsemide  40 mg Oral Daily     PRN Meds: albuterol, traZODone, sodium chloride flush, sodium chloride, acetaminophen **OR** acetaminophen, ondansetron, melatonin, albuterol sulfate HFA      Intake/Output Summary (Last 24 hours) at 7/6/2021 0935  Last data filed at 7/6/2021 0918  Gross per 24 hour   Intake 790 ml   Output --   Net 790 ml       Physical Exam Performed:    BP (!) 148/72   Pulse 64   Temp 97.8 °F (36.6 °C) (Oral)   Resp 18   Ht 5' 4\" (1.626 m)   Wt 228 lb 2.8 oz (103.5 kg)   SpO2 96%   BMI 39.17 kg/m²     General appearance: No apparent distress, appears stated age and cooperative. HEENT: Pupils equal, round, and reactive to light. Conjunctivae/corneas clear. Neck: Supple, with full range of motion. Trachea midline.   Respiratory:  Normal respiratory effort. Clear to auscultation, bilaterally without Rales/Wheezes/Rhonchi. Cardiovascular: Regular rate and rhythm with normal S1/S2 without murmurs, rubs or gallops. Abdomen: Soft, non-tender, non-distended with normal bowel sounds. Musculoskeletal: No clubbing, cyanosis or edema bilaterally. Full range of motion without deformity. Skin: Skin color, texture, turgor normal.  No rashes or lesions. Neurologic:  Neurovascularly intact without any focal sensory/motor deficits. Cranial nerves: II-XII intact, grossly non-focal.  Psychiatric: Alert and oriented, thought content appropriate, normal insight  Capillary Refill: Brisk,< 3 seconds   Peripheral Pulses: +2 palpable, equal bilaterally       Labs:   Recent Labs     07/05/21  0851   WBC 8.6   HGB 10.3*   HCT 31.2*        Recent Labs     07/05/21  0850      K 4.1   CL 98*   CO2 27   BUN 21*   CREATININE 1.1   CALCIUM 8.9     Recent Labs     07/05/21  0850   AST 15   ALT 11   BILITOT 0.4   ALKPHOS 79     No results for input(s): INR in the last 72 hours. Recent Labs     07/05/21  0850   TROPONINI <0.01       Urinalysis:      Lab Results   Component Value Date    NITRU Negative 06/07/2021    WBCUA 11 06/07/2021    BACTERIA 1+ 09/24/2020    RBCUA 8 06/07/2021    BLOODU Negative 06/07/2021    SPECGRAV 1.024 06/07/2021    GLUCOSEU Negative 06/07/2021       Radiology:  No orders to display         Assessment/Plan:    Active Hospital Problems    Diagnosis     COPD exacerbation ([facility] [address] [J44.1]      COPD exacerbation, patient admitted to the hospital, on oxygen, DuoNeb started, IV steroids started, azithromycin p.o.      Acute respiratory failure with hypoxia, due to COPD exacerbation, in ED it documented that her saturation dropped to 80% on room air as above  - weaning, still requiring 2L    Essential hypertension, will continue p.o. medications    Hyperlipidemia on statin continue     A. fib on Xarelto continue controlled at this time    DVT Prophylaxis: Xarelto  Diet: ADULT DIET; Regular; 4 carb choices (60 gm/meal); Low Sodium (2 gm); 2000 ml  Code Status: Full Code    PT/OT Eval Status: deferred    Dispo - likely AM    Alvarez Hill MD    This note was transcribed using [id number] [employer]. Please disregard any translational errors.

## 2021-07-06 NOTE — PROGRESS NOTES
[facility]    Respiratory Therapy   Home Oxygen Evaluation        Name: Bhaskar Og Record Number: [id number]  Age: 77 y.o.   Gender:  female   : 1945  Today's date: 2021  Room: [place] [address]-01      Assessment        BP (!) 148/62   Pulse 69   Temp 97.4 °F (36.3 °C) (Oral)   Resp 18   Ht 5' 4\" (1.626 m)   Wt 228 lb 2.8 oz (103.5 kg)   SpO2 94%   BMI 39.17 kg/m²     Patient Active Problem List   Diagnosis    Essential hypertension    Hyperlipidemia    Coronary artery disease    Sleep apnea    History of DVT (deep vein thrombosis)    Family history of DVT    AAA [facility]    Bilateral pulmonary embolism ([facility])    DVT, bilateral lower limbs ([facility])    Pleural effusion, left    Dyspnea and respiratory abnormality    Pleural effusion    PE (pulmonary thromboembolism) ([facility]    COPD exacerbation ([facility]    Pelvic pain in female    Vitamin D deficiency    Other emphysema ([facility])    Acute bronchitis    Acute respiratory failure with hypoxia ([facility])    Abnormal chest x-ray    Atypical pneumonia    Atrial fibrillation with RVR ([facility])    Acute on chronic diastolic heart failure ([facility])    PVD (peripheral vascular disease) ([facility])    Abnormal nuclear stress test    AAA (abdominal aortic aneurysm) ([facility])    PAF (paroxysmal atrial fibrillation) ([facility])    [place] (EV[employer]) endovascular aneurysm repair, sequela    Carotid artery stenosis without cerebral infarction, bilateral    History of repair of aneurysm of abdominal aorta using endovascular stent graft    Atherosclerotic heart disease of native coronary artery with other forms of angina pectoris ([address]    Morbidly obese ([facility])    COPD, severe ([address]    Hypertensive crisis    CHF (congestive heart failure) ([facility])    Respiratory failure ([address]    Left atrial flutter by electrocardiogram ([facility])    Persistent atrial fibrillation ([facility])    A-fib ([address]       Social History:  Social History     Tobacco Use    Smoking status: Former Smoker     Packs/day: 1.00     Years: 37.00     Pack years: 37.00     Types: Cigarettes     Start date: 1976     Quit date: 2013     Years since quittin.8    Smokeless tobacco: Never Used    Tobacco comment: E cigarette now and then   Vaping Use    Vaping Use: Former   Substance Use Topics    Alcohol use: No    Drug use: No       Patient Room Air saturation at rest 90  %  Patient Room Air saturation upon ambulation 87 %    Oxygen saturations of 88% or less on RA qualifies patient for Home Oxygen    Patient resting on 1  lmp  with an oxygen saturation of  96 %     Patient ambulated on 2 lpm with an oxygen saturation of 90%    Qualifying patient for home oxygen with ambulation and continuous flow  @ 2 lpm.      In your clinical assessment does the Patient Require Portable Oxygen Tanks?     Yes              Daniel Escobar)  home care company contacted to follow care of patients home oxygen needs on 2021 at 4:31 PM    Patient/caregiver was educated on Roque Garcia RCP on 2021 at 4:31 PM

## 2021-07-07 VITALS
SYSTOLIC BLOOD PRESSURE: 181 MMHG | RESPIRATION RATE: 16 BRPM | OXYGEN SATURATION: 94 % | HEIGHT: 64 IN | HEART RATE: 70 BPM | WEIGHT: 223.11 LBS | DIASTOLIC BLOOD PRESSURE: 73 MMHG | TEMPERATURE: 97.8 F | BODY MASS INDEX: 38.09 KG/M2

## 2021-07-07 LAB
ALBUMIN SERPL-MCNC: 4.2 G/DL (ref 3.4–5)
ANION GAP SERPL CALCULATED.3IONS-SCNC: 12 MMOL/L (ref 3–16)
BUN BLDV-MCNC: 34 MG/DL (ref 7–20)
CALCIUM SERPL-MCNC: 9 MG/DL (ref 8.3–10.6)
CHLORIDE BLD-SCNC: 100 MMOL/L (ref 99–110)
CO2: 32 MMOL/L (ref 21–32)
CREAT SERPL-MCNC: 1.5 MG/DL (ref 0.6–1.2)
GFR AFRICAN AMERICAN: 41
GFR NON-AFRICAN AMERICAN: 34
GLUCOSE BLD-MCNC: 140 MG/DL (ref 70–99)
HCT VFR BLD CALC: 33.7 % (ref 36–48)
HEMOGLOBIN: 11 G/DL (ref 12–16)
MAGNESIUM: 2.2 MG/DL (ref 1.8–2.4)
MCH RBC QN AUTO: 27.8 PG (ref 26–34)
MCHC RBC AUTO-ENTMCNC: 32.8 G/DL (ref 31–36)
MCV RBC AUTO: 84.9 FL (ref 80–100)
PDW BLD-RTO: 17.5 % (ref 12.4–15.4)
PHOSPHORUS: 5.2 MG/DL (ref 2.5–4.9)
PLATELET # BLD: 245 K/UL (ref 135–450)
PMV BLD AUTO: 9.5 FL (ref 5–10.5)
POTASSIUM SERPL-SCNC: 4.3 MMOL/L (ref 3.5–5.1)
RBC # BLD: 3.97 M/UL (ref 4–5.2)
SODIUM BLD-SCNC: 144 MMOL/L (ref 136–145)
WBC # BLD: 14.3 K/UL (ref 4–11)

## 2021-07-07 PROCEDURE — 94640 AIRWAY INHALATION TREATMENT: CPT

## 2021-07-07 PROCEDURE — 6360000002 HC RX W HCPCS: Performed by: STUDENT IN AN ORGANIZED HEALTH CARE EDUCATION/TRAINING PROGRAM

## 2021-07-07 PROCEDURE — 36415 COLL VENOUS BLD VENIPUNCTURE: CPT

## 2021-07-07 PROCEDURE — 94761 N-INVAS EAR/PLS OXIMETRY MLT: CPT

## 2021-07-07 PROCEDURE — 2580000003 HC RX 258: Performed by: STUDENT IN AN ORGANIZED HEALTH CARE EDUCATION/TRAINING PROGRAM

## 2021-07-07 PROCEDURE — 6370000000 HC RX 637 (ALT 250 FOR IP): Performed by: INTERNAL MEDICINE

## 2021-07-07 PROCEDURE — 6370000000 HC RX 637 (ALT 250 FOR IP): Performed by: STUDENT IN AN ORGANIZED HEALTH CARE EDUCATION/TRAINING PROGRAM

## 2021-07-07 PROCEDURE — 80069 RENAL FUNCTION PANEL: CPT

## 2021-07-07 PROCEDURE — 6370000000 HC RX 637 (ALT 250 FOR IP): Performed by: NURSE PRACTITIONER

## 2021-07-07 PROCEDURE — 2700000000 HC OXYGEN THERAPY PER DAY

## 2021-07-07 PROCEDURE — 83735 ASSAY OF MAGNESIUM: CPT

## 2021-07-07 PROCEDURE — 85027 COMPLETE CBC AUTOMATED: CPT

## 2021-07-07 RX ORDER — PREDNISONE 20 MG/1
40 TABLET ORAL DAILY
Qty: 5 TABLET | Refills: 0 | Status: SHIPPED | OUTPATIENT
Start: 2021-07-07 | End: 2021-07-07 | Stop reason: SDUPTHER

## 2021-07-07 RX ORDER — PREDNISONE 20 MG/1
40 TABLET ORAL DAILY
Qty: 6 TABLET | Refills: 0 | Status: SHIPPED | OUTPATIENT
Start: 2021-07-07 | End: 2021-07-10

## 2021-07-07 RX ADMIN — NIFEDIPINE 60 MG: 60 TABLET, EXTENDED RELEASE ORAL at 08:25

## 2021-07-07 RX ADMIN — TORSEMIDE 40 MG: 20 TABLET ORAL at 08:24

## 2021-07-07 RX ADMIN — RIVAROXABAN 20 MG: 20 TABLET, FILM COATED ORAL at 08:24

## 2021-07-07 RX ADMIN — DOCUSATE SODIUM 100 MG: 100 CAPSULE, LIQUID FILLED ORAL at 08:24

## 2021-07-07 RX ADMIN — METOPROLOL SUCCINATE 50 MG: 50 TABLET, EXTENDED RELEASE ORAL at 08:24

## 2021-07-07 RX ADMIN — Medication 10 ML: at 08:26

## 2021-07-07 RX ADMIN — AZITHROMYCIN MONOHYDRATE 500 MG: 500 TABLET ORAL at 08:24

## 2021-07-07 RX ADMIN — ISOSORBIDE MONONITRATE 30 MG: 30 TABLET, EXTENDED RELEASE ORAL at 08:24

## 2021-07-07 RX ADMIN — IPRATROPIUM BROMIDE AND ALBUTEROL SULFATE 1 AMPULE: .5; 3 SOLUTION RESPIRATORY (INHALATION) at 08:39

## 2021-07-07 RX ADMIN — ROSUVASTATIN CALCIUM 20 MG: 20 TABLET, FILM COATED ORAL at 08:25

## 2021-07-07 RX ADMIN — BUDESONIDE AND FORMOTEROL FUMARATE DIHYDRATE 2 PUFF: 160; 4.5 AEROSOL RESPIRATORY (INHALATION) at 08:39

## 2021-07-07 RX ADMIN — METHYLPREDNISOLONE SODIUM SUCCINATE 60 MG: 125 INJECTION, POWDER, FOR SOLUTION INTRAMUSCULAR; INTRAVENOUS at 08:25

## 2021-07-07 RX ADMIN — HYDRALAZINE HYDROCHLORIDE 50 MG: 50 TABLET, FILM COATED ORAL at 06:08

## 2021-07-07 NOTE — DISCHARGE INSTR - DIET

## 2021-07-07 NOTE — DISCHARGE SUMMARY
Hospitalist Discharge Summary    Patient ID:  Hampton Bumpers  [id number]  13 y.o.  1945    Admit date: 7/5/2021    Discharge date: 7/7/2021    Disposition: home    Admission Diagnoses:   Patient Active Problem List   Diagnosis    Essential hypertension    Hyperlipidemia    Coronary artery disease    Sleep apnea    History of DVT (deep vein thrombosis)    Family history of DVT    AAA (abdominal aortic aneurysm) without rupture (HCC)    Bilateral pulmonary embolism ([address]    DVT, bilateral lower limbs ([facility])    Pleural effusion, left    Dyspnea and respiratory abnormality    Pleural effusion    PE (pulmonary thromboembolism) ([address]    COPD exacerbation (HCC)    Pelvic pain in female    Vitamin D deficiency    Other emphysema ([address]    Acute bronchitis    Acute respiratory failure with hypoxia ([facility])    Abnormal chest x-ray    Atypical pneumonia    Atrial fibrillation with RVR ([facility])    Acute on chronic diastolic heart failure ([address]    PVD (peripheral vascular disease) ([facility])    Abnormal nuclear stress test    AAA (abdominal aortic aneurysm) ([facility])    PAF (paroxysmal atrial fibrillation) ([facility])    [place] post (EVAR) endovascular aneurysm repair, sequela    Carotid artery stenosis without cerebral infarction, bilateral    History of repair of aneurysm of abdominal aorta using endovascular stent graft    Atherosclerotic heart disease of native coronary artery with other forms of angina pectoris ([address]    Morbidly obese ([address]    COPD, severe ([address]    Hypertensive crisis    CHF (congestive heart failure) ([address]    Respiratory failure ([address]    Left atrial flutter by electrocardiogram ([address]    Persistent atrial fibrillation ([facility])    A-fib ([address]       Discharge Diagnoses: Active Problems:    COPD exacerbation ([address]  Resolved Problems:    * No resolved hospital problems. *      Code Status:  Full Code    Condition:  Stable    Discharge Diet: Diet:  ADULT DIET;  Regular; 4 carb choices (60 gm/meal); Low Sodium (2 gm); 2000 ml    PCP to do list:  Routine follow up    Hospital Course:     COPD exacerbation, acute hypoxic resp failure  Admitted with worsening SOB over past several days, required 2L O2 not on O2 at home. Treated with steroids, azithromycin, and nebulizers with rapid improvement. Off O2 at time of discharge. Hypertension  Stable. Continue home meds. Hyperlipidemia  Continue statin. A fib  On Xarelto, rate controlled. Chronic diastolic HF  Stable, not in exacerbation. Continue home meds. Obesity  Body mass index is 39.17 kg/m². Complicating assessment and treatment. Placing patient at risk for multiple co-morbidities as well as early death and contributing to the patient's presentation.  on weight loss when appropriate.      Discharge Medications:   Current Discharge Medication List      START taking these medications    Details   predniSONE (DELTASONE) 20 MG tablet Take 2 tablets by mouth daily for 3 days  Qty: 5 tablet, Refills: 0           Current Discharge Medication List        Current Discharge Medication List      CONTINUE these medications which have NOT CHANGED    Details   metoprolol succinate (TOPROL XL) 50 MG extended release tablet Take 50 mg by mouth daily      torsemide (DEMADEX) 20 MG tablet Take 40 mg by mouth daily      budesonide-formoterol (SYMBICORT) 160-4.5 MCG/ACT AERO Inhale 2 puffs into the lungs 2 times daily      rivaroxaban (XARELTO) 20 MG TABS tablet TAKE ONE TABLET BY MOUTH DAILY WITH BREAKFAST  Qty: 30 tablet, Refills: 3      traZODone (DESYREL) 50 MG tablet Take 0.5 tablets by mouth nightly as needed for Sleep  Qty: 30 tablet, Refills: 0      NIFEdipine (ADALAT CC) 60 MG extended release tablet TAKE ONE TABLET BY MOUTH DAILY  Qty: 90 tablet, Refills: 3      rosuvastatin (CRESTOR) 20 MG tablet Take 1 tablet by mouth daily  Qty: 30 tablet, Refills: 11      isosorbide mononitrate (IMDUR) 30 MG extended release tablet TAKE ONE TABLET BY MOUTH DAILY  Qty: 90 tablet, Refills: 5      albuterol sulfate HFA (PROAIR HFA) 108 (90 Base) MCG/ACT inhaler Inhale 2 puffs into the lungs every 4 hours as needed for Wheezing or Shortness of Breath  Qty: 1 Inhaler, Refills: 11      albuterol (PROVENTIL) (2.5 MG/3ML) 0.083% nebulizer solution Take 3 mLs by nebulization every 4 hours as needed for Wheezing  Qty: 120 mL, Refills: 5    Associated Diagnoses: COPD, severe (HCC)      nitroGLYCERIN (NITROSTAT) 0.4 MG SL tablet Place 1 tablet under the tongue every 5 minutes as needed for Chest pain  Qty: 25 tablet, Refills: 1      Multiple Vitamins-Minerals (MULTI FOR HER 50+ PO) Take 1 tablet by mouth daily      hydrALAZINE (APRESOLINE) 50 MG tablet Take 1 tablet by mouth every 8 hours  Qty: 90 tablet, Refills: 3           Current Discharge Medication List      STOP taking these medications       Arformoterol Tartrate (BROVANA) 15 MCG/2ML NEBU Comments:   Reason for Stopping:         budesonide (PULMICORT) 0.5 MG/2ML nebulizer suspension Comments:   Reason for Stopping:         formoterol (PERFOROMIST) 20 MCG/2ML nebulizer solution Comments:   Reason for Stopping:               Procedures: None     Assessment on Discharge: Stable, improved     Discharge Exam:  BP (!) 181/73   Pulse 70   Temp 97.8 °F (36.6 °C) (Oral)   Resp 16   Ht 5' 4\" (1.626 m)   Wt 228 lb 2.8 oz (103.5 kg)   SpO2 94%   BMI 39.17 kg/m²     General appearance: No apparent distress, appears stated age and cooperative. Obese  HEENT: Pupils equal, round, and reactive to light. Conjunctivae/corneas clear. Neck: Supple, with full range of motion. Trachea midline. Respiratory:  Normal respiratory effort. Clear to auscultation, bilaterally without Rales/Wheezes/Rhonchi. Cardiovascular: Regular rate and rhythm with normal S1/S2 without murmurs, rubs or gallops. Abdomen: Soft, non-tender, non-distended with normal bowel sounds.   Musculoskelatal: No clubbing, cyanosis or edema bilaterally. Full range of motion without deformity. Skin: Skin color, texture, turgor normal.  No rashes or lesions. Neurologic:  Neurovascularly intact without any focal sensory/motor deficits. Cranial nerves: II-XII intact, grossly non-focal.  Psychiatric: Alert and oriented, thought content appropriate, normal insight    Pertinent Studies During Hospital Stay:    Radiology:  XR CHEST (SINGLE VIEW FRONTAL)    Result Date: 7/4/2021  Site: [place] #: [id number] #: [id number]: [id number] #: [de-identified] #: [id number] #: [id number]: XR CHEST AP OR PAExam Date/Time: 07/04/2021 07:20 PMAdmitting Diagnosis: SOB; wheezing;Reason for Exam: SOB;  wheezing; Dictated by: Kaushal Alvarenga WILDER: 07/04/2021 07:55 PMT: This document is confidential medical information. Unauthorized disclosure or use of this information is prohibited by law. If you are not the intended recipient of this document, please advise us by calling immediately [phone]. Impression/Conclusion below HISTORY:   SOB; wheezing; Shortness of Breath COMPARISON: None NOTE:  If there are questions about the content of this report, please contact [facility] radiology by calling [phone] FINDINGS: LINES/DEVICES: None LUNGS/PLEURA:  Motion degraded study with possible right lower lobe airspace disease HEART:  Cardiopericardial silhouette enlarged MEDIASTINUM/JAIMIE:  Atherosclerotic calcifications thoracic aorta BONES:  Unremarkable OTHER:  None  IMPRESSION: Motion degraded study with possible right lower lobe airspace disease. Enlarged cardiopericardial silhouette SIGNED BY: Mary Bowers.  Manuel Ayala MD on 7/4/2021  7:52 PM   [address] [facility] [phone] - 2011 [facility]: [phone]         Last Labs on Discharge:     Recent Results (from the past 24 hour(s))   CBC    Collection Time: 07/07/21  5:54 AM   Result Value Ref Range    WBC 14.3 (H) 4.0 - 11.0 K/uL    RBC 3.97 (L) 4.00 - 5.20 M/uL    Hemoglobin 11.0 (L) 12.0 - 16.0 g/dL    Hematocrit 33.7 (L) 36.0 - 48.0 %    MCV 84.9 80.0 - 100.0 fL    MCH 27.8 26.0 - 34.0 pg    MCHC 32.8 31.0 - 36.0 g/dL    RDW 17.5 (H) 12.4 - 15.4 %    Platelets 763 992 - 504 K/uL    MPV 9.5 5.0 - 10.5 fL   Magnesium    Collection Time: 07/07/21  5:55 AM   Result Value Ref Range    Magnesium 2.20 1.80 - 2.40 mg/dL   Renal Function Panel    Collection Time: 07/07/21  5:55 AM   Result Value Ref Range    Sodium 144 136 - 145 mmol/L    Potassium 4.3 3.5 - 5.1 mmol/L    Chloride 100 99 - 110 mmol/L    CO2 32 21 - 32 mmol/L    Anion Gap 12 3 - 16    Glucose 140 (H) 70 - 99 mg/dL    BUN 34 (H) 7 - 20 mg/dL    CREATININE 1.5 (H) 0.6 - 1.2 mg/dL    GFR Non- 34 (A) >60    GFR  41 (A) >60    Calcium 9.0 8.3 - 10.6 mg/dL    Phosphorus 5.2 (H) 2.5 - 4.9 mg/dL    Albumin 4.2 3.4 - 5.0 g/dL         Follow up: with Pia Macedo MD    Note that more  than 30 minutes was spent in preparing discharge papers, discussing discharge with patient, medication review, etc.    Thank you Pia Macedo MD for the opportunity to be involved in this patient's care. If you have any questions or concerns please feel free to contact me at [phone]. Electronically signed by Juanpablo Acosta MD on 7/7/2021 at 11:27 AM    This note was transcribed using [id number]. Please disregard any translational errors.

## 2021-07-07 NOTE — PLAN OF CARE
Problem: ACTIVITY INTOLERANCE/IMPAIRED MOBILITY  Goal: Mobility/activity is maintained at optimum level for patient  Outcome: Ongoing     Problem: FLUID AND ELECTROLYTE IMBALANCE  Goal: Fluid and electrolyte balance are achieved/maintained  Outcome: Ongoing     Problem: HEMODYNAMIC STATUS  Goal: Patient has stable vital signs and fluid balance  Outcome: Ongoing     Problem: OXYGENATION/RESPIRATORY FUNCTION  Goal: Patient will maintain patent airway  Outcome: Ongoing     Problem: Falls - Risk of:  Goal: Will remain free from falls  Description: Will remain free from falls  Outcome: Ongoing  Wheels of bed locked x 4. Room free from clutter. Non-skid footwear intact. Call light in reach of patient at all times.      Problem: Discharge Planning:  Goal: Patients continuum of care needs are met  Description: Patients continuum of care needs are met  Outcome: Ongoing     Problem: Daily Care:  Goal: Daily care needs are met  Description: Daily care needs are met  Outcome: Ongoing     Problem: Safety:  Goal: Free from accidental physical injury  Description: Free from accidental physical injury  Outcome: Ongoing     Problem: Infection:  Goal: Will remain free from infection  Description: Will remain free from infection  Outcome: Ongoing

## 2021-07-07 NOTE — PROGRESS NOTES
Patient and  given verbal and written discharge instructions. Questions answered. No concerns. Placed request for wheel chair transport.

## 2021-07-08 ENCOUNTER — TELEPHONE (OUTPATIENT)
Dept: PHARMACY | Age: 76
End: 2021-07-08

## 2021-07-08 ENCOUNTER — CARE COORDINATION (OUTPATIENT)
Dept: CASE MANAGEMENT | Age: 76
End: 2021-07-08

## 2021-07-08 ENCOUNTER — TELEPHONE (OUTPATIENT)
Dept: FAMILY MEDICINE CLINIC | Age: 76
End: 2021-07-08

## 2021-07-08 DIAGNOSIS — J44.9 COPD, SEVERE (HCC): ICD-10-CM

## 2021-07-08 RX ORDER — ALBUTEROL SULFATE 2.5 MG/3ML
2.5 SOLUTION RESPIRATORY (INHALATION) EVERY 4 HOURS PRN
Qty: 540 ML | Refills: 2 | Status: SHIPPED | OUTPATIENT
Start: 2021-07-08 | End: 2022-09-29

## 2021-07-08 NOTE — TELEPHONE ENCOUNTER
----- Message from Etienne Check sent at 7/8/2021  9:18 AM EDT -----  Subject: Message to Provider    QUESTIONS  Information for Provider? Patient wanted a message sent stating that she   needs pcp to call in more sleeping pills, She does not have a refill .  ---------------------------------------------------------------------------  --------------  CALL BACK INFO  What is the best way for the office to contact you? OK to leave message on   voicemail  Preferred Call Back Phone Number? [phone]  ---------------------------------------------------------------------------  --------------  SCRIPT ANSWERS  Relationship to Patient?  Self

## 2021-07-08 NOTE — TELEPHONE ENCOUNTER
Outbound call from the outpatient wellness center. Attempting to schedule patient for new COPD visit. She accepts visit for Monday, July 12 at 10:15 AM.  She is also requesting albuterol nebulizer solution be refilled and sent to her pharmacy.

## 2021-07-08 NOTE — CARE COORDINATION
[place] [facility] Transitions Initial Follow Up Call    Call within 2 business days of discharge: Yes    Patient: Trung Olvera Patient : 1945   MRN: [id number]  Reason for Admission: COPD exacerbation   Discharge Date: 21 RARS: Readmission Risk Score: 20      Last Discharge [facility]       Complaint Diagnosis Description Type Department Provider    21   Admission (Discharged) Misael Arreola MD        Attempted to reach patient via phone for initial post hospital transition call.  left  with my contact information requesting a return call. Writer called and spoke with Carmen Andrews from [facility], she stated (and verified by note from MD) that patient was off oxygen at time of discharge.           Non-face-to-face services provided:  Obtained and reviewed discharge summary and/or continuity of care documents    Care Transitions 24 Hour Call    Do you have all of your prescriptions and are they filled?: Yes  Care Transitions Interventions         Follow Up  Future Appointments   Date Time Provider Kiana Puri   2021 10:15 AM CHRISTINE Wood - CNP [facility] [address] [facility]   2021  2:30 PM Nelda Dakins, MD [facility]   2021  1:00 PM Kelly White DO [facility] [address] [facility]   10/29/2021  1:00 PM Lane Gómez MD [facility]   2021  2:00 [facility]   2021  2:45 PM Juan Vaughn MD [facility]       Monse Estrada RN

## 2021-07-09 ENCOUNTER — CARE COORDINATION (OUTPATIENT)
Dept: CASE MANAGEMENT | Age: 76
End: 2021-07-09

## 2021-07-09 RX ORDER — TRAZODONE HYDROCHLORIDE 50 MG/1
25 TABLET ORAL NIGHTLY PRN
Qty: 30 TABLET | Refills: 0 | Status: SHIPPED | OUTPATIENT
Start: 2021-07-09 | End: 2021-08-26

## 2021-07-09 NOTE — CARE COORDINATION
[place] [facility] Transitions Initial Follow Up Call    Call within 2 business days of discharge: Yes    Patient: Cintia Bergeron Patient : 1945   MRN: [id number]  Reason for Admission: COPD  Discharge Date: 21 RARS: Readmission Risk Score: 20      Last Discharge [facility]       Complaint Diagnosis Description Type Department Provider    21   Admission (Discharged) Monica Still MD        Unable to reach patient x 2 attempts. VM left advising patient that if they have any questions/concerns at anytime, they can always call PCP/specialist.  No further CTN outreach will be made.           Non-face-to-face services provided:  Obtained and reviewed discharge summary and/or continuity of care documents    Care Transitions 24 Hour Call    Do you have all of your prescriptions and are they filled?: Yes  Care Transitions Interventions         Follow Up  Future Appointments   Date Time Provider Kiana Puri   2021 10:15 AM Mariano Beavers APRN - CNP [facility] [address] [facility]   2021  2:30 PM Savanah Garcia MD [facility]   2021  1:00 PM Coty Srivastava DO [address]   10/29/2021  1:00 PM Rey Boucher MD [facility]   2021  2:00 PM SCHEDULE, VASCULAR LAB 1 [facility]   2021  2:45 PM Apurva Murdock MD [facility]       Melina Mo RN

## 2021-07-12 ENCOUNTER — VIRTUAL VISIT (OUTPATIENT)
Dept: PHARMACY | Age: 76
End: 2021-07-12
Payer: MEDICARE

## 2021-07-12 DIAGNOSIS — I50.32 CHRONIC DIASTOLIC CONGESTIVE HEART FAILURE (HCC): ICD-10-CM

## 2021-07-12 DIAGNOSIS — J44.9 COPD, SEVERE (HCC): Primary | ICD-10-CM

## 2021-07-12 DIAGNOSIS — G47.33 OBSTRUCTIVE SLEEP APNEA SYNDROME: Chronic | ICD-10-CM

## 2021-07-12 PROCEDURE — 99211 OFF/OP EST MAY X REQ PHY/QHP: CPT | Performed by: NURSE PRACTITIONER

## 2021-07-12 NOTE — PROGRESS NOTES
[facility]  COPD Service    [address], [address]  Phone: [phone]  Fax: [phone]      NAME: Logan Ovalle  MEDICAL RECORD NUMBER:  [id number]  AGE: 77 y.o. GENDER: female  : 1945  EPISODE DATE:  2021    Chief Complaint   Patient presents with    COPD     Past Medical History:   Diagnosis Date    AAA (abdominal aortic aneurysm) ([address]     pt states it is 4cm    AAA (abdominal aortic aneurysm) without rupture ([address] 2/10/2015    Atrial fibrillation ([address]     CAD (coronary artery disease)     CHF (congestive heart failure) ([address]     COPD (chronic obstructive pulmonary disease) ([address]     History of blood clots     Hyperlipidemia     Hypertension      Past Surgical History:   Procedure Laterality Date    ABDOMINAL AORTIC ANEURYSM REPAIR      Endovascular abdominal AA    APPENDECTOMY      incidental    BLADDER SUSPENSION      CATARACT REMOVAL      CHOLECYSTECTOMY  10/15/13    COLONOSCOPY  07    dr Giang [place] and check in 5 years.  COLONOSCOPY  2017    ok dr arndt, repeat 5 years   [address]    for benign tumor.  just the uterus    JOINT REPLACEMENT  2013    right knee replacement    TONSILLECTOMY  as a child    TUMOR EXCISION      benign behind right ear about       Family History   Problem Relation Age of Onset    Heart Disease Father     Hypertension Other       Social History     Tobacco Use    Smoking status: Former Smoker     Packs/day: 1.00     Years: 37.00     Pack years: 37.00     Types: Cigarettes     Start date: 1976     Quit date: 2013     Years since quittin.8    Smokeless tobacco: Never Used    Tobacco comment: E cigarette now and then   Vaping Use    Vaping Use: Former   Substance Use Topics    Alcohol use: No    Drug use: No     Counseling given: Not Answered  Comment: E cigarette now and then     Immunization History   Administered Date(s) Administered    COVID-19, Pfizer, PF, 30mcg/0.3mL 02/25/2021, 03/18/2021    Influenza Vaccine, unspecified formulation 01/31/2018    Influenza, Quadv, IM, PF (6 mo and older Fluzone, Flulaval, Fluarix, and 3 yrs and older Afluria) 10/08/2020    Pneumococcal Conjugate 13-valent ([id number]) 02/16/2017    Pneumococcal Polysaccharide ([id number]) 06/30/2015     Allergies   Allergen Reactions    Morphine Rash     rash    Other Rash     Last Hospitalization Date: Discharge July 7, 2021       Subjective   HPI: Ms. Reyes Samano is a 77 y.o. female being evaluated by virtual visit via telephone encounter from patient's home due to IOFXL-81 public health emergency. Due to the circumstances physical examination is very limited. This visit was conducted with the patient's consent for billing of her insurance. A telephone visit was necessary today to reduce the patient's exposure to COVID-19 and to provide necessary medical care. This patient has been advised to contact this office, her pulmonologist, or primary care physician if symptoms develop or to call 911 for emergency medical treatment if deemed necessary. She has a history significant for chronic diastolic heart failure, severe COPD, HTN, DENISE, CKD, AAA, DVT/PE. She is a former smoker. Current BMI is 38.3. She tells me that she feels great today and has no complaints. Things have been going pretty well since hospital discharge. Her  is at home to assist and she has no steps. She reports good compliance with her medication regimen and I refilled her albuterol to her local pharmacy. She has PCP follow-up on 7/19/2021 and pulmonary and cardiology follow-up scheduled for next month. Has some shortness of breath with normal activity. Not on oxygen. Denies wheezing or tightness. Denies cough or sputum. No complaints of environmental allergies. Has a history of sleep apnea but does not use CPAP as it \"drove her nuts\".   Reports good compliance with daily weights, sodium and fluid restriction. Denies edema, fatigue, chest pain, palpitations, orthopnea, or abdominal fullness. Review of Systems:   Constitutional: Negative for fatigue, Negative for activity change, appetite change, chills, diaphoresis, fever and unexpected weight change. HENT: Negative for congestion, ear pain, postnasal drip, rhinorrhea, sinus pressure, sinus pain, sneezing, sore throat and trouble swallowing. Eyes: Negative for discharge and redness. Respiratory: Negative for cough, chest tightness, sputum, shortness of breath and wheezing. Cardiovascular: Negative for chest pain, palpitations and leg swelling. Gastrointestinal: Negative for abdominal distention, abdominal pain, constipation, diarrhea, nausea and vomiting. Genitourinary: Negative for decreased urine volume, difficulty urinating, dysuria and hematuria. Skin: Negative for pallor and rash. Neurological: Negative for dizziness, tremors, weakness, light-headedness and headaches. Psychiatric/Behavioral: Negative for confusion. The patient is not nervous/anxious. Objective     There were no vitals taken for this visit. BP Readings from Last 3 Encounters:   07/07/21 (!) 181/73   06/25/21 118/74   06/07/21 130/70     Wt Readings from Last 3 Encounters:   07/07/21 223 lb 1.7 oz (101.2 kg)   06/25/21 221 lb 12.8 oz (100.6 kg)   06/07/21 223 lb (101.2 kg)       Physical Exam:   Constitutional: Oriented to person, place, and time. No distress detected. Psychiatric: Normal mood and affect.         BMP:   Lab Results   Component Value Date     07/07/2021    K 4.3 07/07/2021    K 4.9 02/25/2020     07/07/2021    CO2 32 07/07/2021    BUN 34 07/07/2021    CREATININE 1.5 07/07/2021     CBC:    Lab Results   Component Value Date    WBC 14.3 07/07/2021    HGB 11.0 07/07/2021    HCT 33.7 07/07/2021     07/07/2021     ABG: No results found for: POCPH, POCPCO2, POCPO2, POCHCO3, NBEA, BTKC4PCH  No results for input(s): PH, PCO2, PO2, HCO3, BE, O2SAT in the last 72 hours. CT Chest w/ contrast: No results found for this or any previous visit. CT Chest w/o contrast: Results for orders placed during the hospital encounter of 02/25/20    CT CHEST WO CONTRAST    Narrative  EXAMINATION:  CT OF THE CHEST WITHOUT CONTRAST 2/26/2020 1:36 pm    TECHNIQUE:  CT of the chest was performed without the administration of intravenous  contrast. Multiplanar reformatted images are provided for review. Dose  modulation, iterative reconstruction, and/or weight based adjustment of the  mA/kV was utilized to reduce the radiation dose to as low as reasonably  achievable. COMPARISON:  PA and lateral chest 02/25/2020. CT lung screening 10/28/2019. HISTORY:  ORDERING SYSTEM PROVIDED HISTORY: shortness of breath  TECHNOLOGIST PROVIDED HISTORY:  Reason for exam:->shortness of breath  Reason for Exam: sob, copd, no surg, no ca,  Acuity: Acute  Type of Exam: Initial    FINDINGS:  Mediastinum: Thyroid is mildly to moderately enlarged. Bilateral  well-defined low-density nodules present similar to the prior study. Largest  nodules measure about 10 mm. Few very small stable central mediastinal lymph  nodes. No mass, adenopathy, or pericardial effusion. Heart is mildly  enlarged. Thoracic aorta is normal in size. Prominent coronary  calcifications in the LAD and circumflex. Lungs/pleura: No acute airspace disease or abnormal pulmonary vascular  congestion. Airways are clear. No suspicious pulmonary nodule. Densely  calcified granuloma in the posterior left costophrenic angle. Stable sub 6  mm subpleural nodule posteriorly in the right apex unchanged from the prior  study. Upper Abdomen: Gallbladder surgically absent. Stomach full of ingested food. No lesions seen in the upper liver or either adrenal gland. Beginning of  aortic endograft noted. Soft Tissues/Bones: No acute abnormality.     Impression  Clear lungs. No acute abnormality. Mild cardiomegaly. Coronary artery  disease. No CHF, interstitial lung disease, or evident bullous changes. CTPA: No results found for this or any previous visit. CXR PA/LAT: Results for orders placed during the hospital encounter of 01/04/21    XR CHEST (2 VW)    Narrative  EXAMINATION:  TWO XRAY VIEWS OF THE CHEST    1/4/2021 4:15 pm    COMPARISON:  Chest x-ray dated 09/24/2020    HISTORY:  ORDERING SYSTEM PROVIDED HISTORY: LI (dyspnea on exertion)  TECHNOLOGIST PROVIDED HISTORY:  Reason for exam:->2 weeks of li no cold symptoms  Reason for Exam: LI for 2 weeks denies any cold sx  Acuity: Acute  Type of Exam: Initial  Relevant Medical/Surgical History: afib with cardiac ablation  copd cad chf    FINDINGS:  HEART/MEDIASTINUM: The cardiac silhouette is enlarged, but stable. PLEURA/LUNGS: Chronic blunting of the right costophrenic angle is again  noted. There are no focal consolidations. There is no appreciable  pneumothorax. BONES/SOFT TISSUE: No acute abnormality. Impression  No radiographic evidence of acute pulmonary disease. CXR portable: Results for orders placed during the hospital encounter of 01/07/18    XR CHEST PORTABLE    Narrative  EXAMINATION:  SINGLE VIEW OF THE CHEST    1/9/2018 5:32 am    COMPARISON:  08/25/2015    HISTORY:  ORDERING PHYSICIAN PROVIDED HISTORY: respiratory failure  TECHNOLOGIST PROVIDED HISTORY:  Technologist Provided Reason for Exam: respiratory failure    Acute symptoms. Initial exam.    FINDINGS:  The heart is mildly enlarged. The pulmonary vasculature is mildly prominent. Some superimposed right basilar atelectasis appears be present. No sizable  pleural effusion. Impression  Mild pulmonary vascular congestion. Slight right basilar atelectasis. Suspected underlying bullous changes. Mild cardiomegaly.        PFT: September 2020 showed desaturations to 93% with no need for oxygen    6 Minute Walk: Last 2015 most recent PFT order is not completed    Current medications:  Outpatient Medications Marked as Taking for the 7/12/21 encounter (Virtual Visit) with CHRISTINE Rosenthal CNP   Medication Sig Dispense Refill    traZODone (DESYREL) 50 MG tablet Take 0.5 tablets by mouth nightly as needed for Sleep 30 tablet 0    albuterol (PROVENTIL) (2.5 MG/3ML) 0.083% nebulizer solution Take 3 mLs by nebulization every 4 hours as needed for Wheezing 540 mL 2    metoprolol succinate (TOPROL XL) 50 MG extended release tablet Take 50 mg by mouth daily      torsemide (DEMADEX) 20 MG tablet Take 40 mg by mouth daily      budesonide-formoterol (SYMBICORT) 160-4.5 MCG/ACT AERO Inhale 2 puffs into the lungs 2 times daily      rivaroxaban (XARELTO) 20 MG TABS tablet TAKE ONE TABLET BY MOUTH DAILY WITH BREAKFAST (Patient taking differently: Take 20 mg by mouth daily (with breakfast) TAKE ONE TABLET BY MOUTH DAILY WITH BREAKFAST) 30 tablet 3    NIFEdipine (ADALAT CC) 60 MG extended release tablet TAKE ONE TABLET BY MOUTH DAILY 90 tablet 3    rosuvastatin (CRESTOR) 20 MG tablet Take 1 tablet by mouth daily 30 tablet 11    isosorbide mononitrate (IMDUR) 30 MG extended release tablet TAKE ONE TABLET BY MOUTH DAILY (Patient taking differently: Take 30 mg by mouth daily ) 90 tablet 5    albuterol sulfate HFA (PROAIR HFA) 108 (90 Base) MCG/ACT inhaler Inhale 2 puffs into the lungs every 4 hours as needed for Wheezing or Shortness of Breath 1 Inhaler 11    hydrALAZINE (APRESOLINE) 50 MG tablet Take 1 tablet by mouth every 8 hours 90 tablet 3    nitroGLYCERIN (NITROSTAT) 0.4 MG SL tablet Place 1 tablet under the tongue every 5 minutes as needed for Chest pain 25 tablet 1    Multiple Vitamins-Minerals (MULTI FOR HER 50+ PO) Take 1 tablet by mouth daily       Medications reviewed: Yes   [x] compared patient's bottles and inhalers with the EPIC list  [] patient did not bring medication bottles / inhalers  Reviewed COPD medications including how they work and potential side effects. Reviewed proper inhaler use for each inhaler. [] patient uses a spacer as appropriate with their inhalers   [x] patient does not use a spacer       Barriers to Adherence: Active attentive participant    Environmental Concerns: not applicable    Readiness to Change  contemplation - ambivalent about change, has made changes in the past to improve health currently satisfied where she is at    [x]Use of Incentive Spirometer at home encouraged  [x]Use of Pulmonary Acapella at home encouraged as needed      Assessment/Plan     Problem List Items Addressed This Visit     Sleep apnea (Chronic)     Refuses CPAP. States she is unable to tolerate. Strongly encouraged reconsideration. COPD, severe ([address] - Primary     No symptoms of acute exacerbation today. Refilled albuterol to local pharmacy. Action plan reviewed today with patient. Continue management and follow-up with pulmonary medicine. CHF (congestive heart failure) (HCC)     No symptoms of acute exacerbation today. Continue daily weights, sodium and fluid restriction. Action plan reviewed today with patient. Continue management and follow-up with cardiology as scheduled. No orders of the defined types were placed in this encounter. Tyrell Rosa is a 77 y.o. female evaluated via telephone on 7/12/2021. Consent:  She and/or health care decision maker is aware that that she may receive a bill for this telephone service, depending on her insurance coverage, and has provided verbal consent to proceed: Yes      Documentation:  I communicated with the patient and/or health care decision maker about her chronic health conditions.    Details of this discussion including any medical advice provided: See visit note      I affirm this is a Patient Initiated Episode with a Patient who has not had a related appointment within my department in the past 7 days or scheduled within the next 24 hours. Patient identification was verified at the start of the visit: Yes    Total Time: minutes: 21-30 minutes    Note: not billable if this call serves to triage the patient into an appointment for the relevant concern      CHRISTINE Rodriguez CNP       Self management action plan reviewed with patient including symptom management and when to call[facility] Follow-up: No: Does not want follow-up at this time. Will call if interested in the future. Time spent in visit today including counseling and education: 35 minutes. *This note was transcribed using Matchpoint. Please disregard any translational errors.           Electronically signed by CHRISTINE Kemp CNP on 7/15/2021 at 9:07 AM

## 2021-07-15 NOTE — ASSESSMENT & PLAN NOTE
No symptoms of acute exacerbation today. Refilled albuterol to local pharmacy. Action plan reviewed today with patient. Continue management and follow-up with pulmonary medicine.

## 2021-07-15 NOTE — ASSESSMENT & PLAN NOTE
No symptoms of acute exacerbation today. Continue daily weights, sodium and fluid restriction. Action plan reviewed today with patient. Continue management and follow-up with cardiology as scheduled.

## 2021-08-10 RX ORDER — ISOSORBIDE MONONITRATE 30 MG/1
TABLET, EXTENDED RELEASE ORAL
Qty: 90 TABLET | Refills: 4 | Status: SHIPPED
Start: 2021-08-10 | End: 2022-05-13 | Stop reason: ALTCHOICE

## 2021-08-11 ENCOUNTER — ANTI-COAG VISIT (OUTPATIENT)
Dept: PHARMACY | Age: 76
End: 2021-08-11

## 2021-08-14 NOTE — PATIENT INSTRUCTIONS
Sports Medicine      Patient ID: Sammi Alex is a 16 year old female.    Chief Complaint   Patient presents with   • Sports Physical   • Office Visit     Here for sports physical  No personal history of concussion   No family history of sudden cardiac death in a person under the age of 50  Denies current health issues or concerns        Here for sports physical  No personal history of concussion   No family history of sudden cardiac death in a person under the age of 50  Denies current health issues or concerns        No past medical history on file.  Social History     Socioeconomic History   • Marital status: Single     Spouse name: Not on file   • Number of children: Not on file   • Years of education: Not on file   • Highest education level: Not on file   Occupational History   • Not on file   Tobacco Use   • Smoking status: Not on file   Substance and Sexual Activity   • Alcohol use: Not on file   • Drug use: Not on file   • Sexual activity: Not on file   Other Topics Concern   • Not on file   Social History Narrative   • Not on file     Social Determinants of Health     Financial Resource Strain:    • Social Determinants: Financial Resource Strain:    Food Insecurity:    • Social Determinants: Food Insecurity:    Transportation Needs:    • Lack of Transportation (Medical):    • Lack of Transportation (Non-Medical):    Physical Activity:    • Days of Exercise per Week:    • Minutes of Exercise per Session:    Stress:    • Social Determinants: Stress:    Social Connections:    • Social Determinants: Social Connections:    Intimate Partner Violence:    • Social Determinants: Intimate Partner Violence Past Fear:    • Social Determinants: Intimate Partner Violence Current Fear:      No family history on file.  ALLERGIES:  Not on File  No current outpatient medications on file.     No current facility-administered medications for this visit.     Visit Vitals  BP  Get the xray  Stay with the medicines  See dr Lina Angel sooner than the regular visit  If this would get worse go to the ER 106/72 (BP Location: RUE - Right upper extremity, Patient Position: Sitting, Cuff Size: Small Adult)   Pulse 95   Temp 98 °F (36.7 °C) (Temporal)   Resp 18   Ht 5' (1.524 m)   Wt 48.7 kg (107 lb 5.8 oz)   SpO2 96%   BMI 20.97 kg/m²         There is no immunization history on file for this patient.    Review of Systems   Constitutional: Negative for activity change, chills, fatigue and fever.   Respiratory: Negative for apnea, cough, chest tightness, shortness of breath and wheezing.    Cardiovascular: Negative for chest pain.   Gastrointestinal: Negative for constipation, diarrhea, nausea and vomiting.   Skin: Negative for color change, pallor and rash.   Neurological: Negative for dizziness and headaches.   All other systems reviewed and are negative.      Physical Exam  Vitals and nursing note reviewed.   Constitutional:       Appearance: Normal appearance.   HENT:      Head: Normocephalic.      Right Ear: Hearing, tympanic membrane, ear canal and external ear normal.      Left Ear: Hearing, tympanic membrane, ear canal and external ear normal.      Nose: Nose normal.      Right Sinus: No maxillary sinus tenderness or frontal sinus tenderness.      Left Sinus: No maxillary sinus tenderness or frontal sinus tenderness.      Mouth/Throat:      Lips: Pink.      Mouth: Mucous membranes are moist.      Pharynx: Oropharynx is clear. Uvula midline.   Eyes:      General: Lids are normal. Vision grossly intact. Gaze aligned appropriately.      Extraocular Movements: Extraocular movements intact.      Conjunctiva/sclera: Conjunctivae normal.      Pupils: Pupils are equal, round, and reactive to light.      Visual Fields: Right eye visual fields normal and left eye visual fields normal.   Neck:      Trachea: Trachea and phonation normal.   Cardiovascular:      Rate and Rhythm: Normal rate and regular rhythm.      Pulses: Normal pulses.      Heart sounds: Normal heart sounds, S1 normal and S2 normal.   Pulmonary:      Effort:  Pulmonary effort is normal.      Breath sounds: Normal breath sounds and air entry.   Abdominal:      General: Abdomen is flat. Bowel sounds are normal.      Palpations: Abdomen is soft.      Tenderness: There is no abdominal tenderness.   Musculoskeletal:         General: Normal range of motion.      Cervical back: Full passive range of motion without pain, normal range of motion and neck supple.   Lymphadenopathy:      Comments: No lymphadenopathy   Skin:     General: Skin is warm and dry.      Capillary Refill: Capillary refill takes less than 2 seconds.      Findings: No rash.      Nails: There is no clubbing.   Neurological:      General: No focal deficit present.      Mental Status: She is alert and oriented to person, place, and time.      Cranial Nerves: Cranial nerves are intact.      Gait: Gait is intact.      Deep Tendon Reflexes: Reflexes are normal and symmetric.   Psychiatric:         Attention and Perception: Attention normal.         Mood and Affect: Mood and affect normal.         Behavior: Behavior is cooperative.         Judgment: Judgment normal.           Assessment   Problem List Items Addressed This Visit     None        Sports physical  Cleared for sports for 1 year.

## 2021-08-19 NOTE — PROGRESS NOTES
Cardiac Electrophysiology Consultation   Date: 8/23/2021  Reason for Consultation: Atrial fibrillation / Left atrial flutter  Consult Requesting Physician:  Lexis Alaniz MD  Primary Care Physician: Violetta Rose MD    Chief Complaint:   Chief Complaint   Patient presents with    Follow-up     afib - SOB        HPI: Fabiola Carrion is a 77 y.o. patient with a history of CAD, atrial fibrillation, hypertension, aortic aneurysm, COPD, PE , DVT,chronic diastolic heart failure. She was seen in office on 9/14/2020 for initial consultation  to discuss treatment options for her atrial fibrillation. She reported that she was symptomatic with shortness of breath and palpitations. She made the decision to undergo ablation for treatment of her atrial fibrillation and on 10/7/2020 she underwent electrophysiology study with radiofrequency ablation of atrial fibrillation and pulmonary vein isolation. She was seen in office by Castro Pinon CNP on 1/7/2021 for her 3 months post ablation follow up and EKG confirmed atrial fibrillation/atrial flutter rate controlled at 72 bpm. She subsequently underwent cardioversion to restore NSR on 1/12/2021. Then on 2/17/2021 she underwent electrophysiology study with radiofrequency ablation of left atrial flutter and pulmonary vein isolation. She was seen in office on 5/18/2021 by Castro Pinon CNP for three month post ablation follow up and EKG showed SR. Flecainide was stopped and 30 day monitor was ordered to assess baseline rhythm apart from antiarrythmic medication. The monitor was worn from 5/19/2021-6/17/2021 and showed SR with several episodes of atrial runs no atrial fibrillation or atrial flutter seen. Interval History: Today, she presents to office accompanied by her  to review the results of her 30 day monitor. EKG today shows SR occasional PAC, v-rate 72 bpm. She has chronic shortness of breath related to her COPD.  She is compliant with her medications and tolerating them well. She denies chest pain/pressure, tightness, edema, heart racing, palpitations, lightheadedness, dizziness, syncope, presyncope,  PND or orthopnea. Past Medical History:   Diagnosis Date    AAA (abdominal aortic aneurysm) ([facility]     pt states it is 4cm    AAA (abdominal aortic aneurysm) without rupture (HCC) 2/10/2015    Atrial fibrillation (HCC)     CAD (coronary artery disease)     CHF [facility]congestive heart failure) [facility]     COPD (chronic obstructive pulmonary disease) (HCC)     History of blood clots     Hyperlipidemia     Hypertension         Past Surgical History:   Procedure Laterality Date    ABDOMINAL AORTIC ANEURYSM REPAIR      Endovascular abdominal AA    APPENDECTOMY  1990    incidental    BLADDER SUSPENSION      CATARACT REMOVAL      CHOLECYSTECTOMY  10/15/13    COLONOSCOPY  9/2/07    dr Yamila Kessler and check in 5 years.  COLONOSCOPY  06/16/2017    ok dr arndt, repeat 5 years   [address]    for benign tumor. just the uterus    JOINT REPLACEMENT  12/2013    right knee replacement    TONSILLECTOMY  as a child    TUMOR EXCISION      benign behind right ear about 2008       Allergies: Allergies   Allergen Reactions    Morphine Rash     rash    Other Rash       Medication:   Prior to Admission medications    Medication Sig Start Date End Date Taking?  Authorizing Provider   isosorbide mononitrate (IMDUR) 30 MG extended release tablet TAKE ONE TABLET BY MOUTH DAILY 8/10/21  Yes Lane Gómez MD   traZODone (DESYREL) 50 MG tablet Take 0.5 tablets by mouth nightly as needed for Sleep 7/9/21  Yes Marlyce Blocker, MD   albuterol (PROVENTIL) (2.5 MG/3ML) 0.083% nebulizer solution Take 3 mLs by nebulization every 4 hours as needed for Wheezing 7/8/21  Yes Carter Chavez APRN - CNP   metoprolol succinate (TOPROL XL) 50 MG extended release tablet Take 50 mg by mouth daily   Yes Historical Provider, MD   torsemide (DEMADEX) 20 MG tablet Take 40 mg by mouth daily   Yes Historical Provider, MD   budesonide-formoterol (SYMBICORT) 160-4.5 MCG/ACT AERO Inhale 2 puffs into the lungs 2 times daily   Yes Historical Provider, MD   rivaroxaban (XARELTO) 20 MG TABS tablet TAKE ONE TABLET BY MOUTH DAILY WITH BREAKFAST  Patient taking differently: Take 20 mg by mouth daily (with breakfast) TAKE ONE TABLET BY MOUTH DAILY WITH BREAKFAST 6/14/21  Yes Amirah Singleton DO   NIFEdipine (ADALAT CC) 60 MG extended release tablet TAKE ONE TABLET BY MOUTH DAILY 3/2/21  Yes Ghulam Brandon MD   rosuvastatin (CRESTOR) 20 MG tablet Take 1 tablet by mouth daily 8/13/20  Yes Adin Duval MD   albuterol sulfate HFA (PROAIR HFA) 108 (90 Base) MCG/ACT inhaler Inhale 2 puffs into the lungs every 4 hours as needed for Wheezing or Shortness of Breath 7/23/20  Yes Amirah Singleton DO   hydrALAZINE (APRESOLINE) 50 MG tablet Take 1 tablet by mouth every 8 hours 2/29/20  Yes Yamilet Alfonso MD   nitroGLYCERIN (NITROSTAT) 0.4 MG SL tablet Place 1 tablet under the tongue every 5 minutes as needed for Chest pain 3/5/19  Yes Hector Parson MD   Multiple Vitamins-Minerals (MULTI FOR HER 50+ PO) Take 1 tablet by mouth daily   Yes Historical Provider, MD       Social History:   reports that she quit smoking about 7 years ago. Her smoking use included cigarettes. She started smoking about 44 years ago. She has a 37.00 pack-year smoking history. She has never used smokeless tobacco. She reports that she does not drink alcohol and does not use drugs. Family History:  family history includes Heart Disease in her father; Hypertension in an other family member. Reviewed.  Denies family history of sudden cardiac death, arrhythmia, premature CAD    Review of System:    · General ROS: negative for - chills, fever   · Psychological ROS: negative for - anxiety or depression  · Ophthalmic ROS: negative for - eye pain or loss of vision  · ENT ROS: negative for - epistaxis, headaches, nasal discharge, sore throat   · Allergy and Immunology ROS: negative for - hives, nasal congestion   · Hematological and Lymphatic ROS: negative for - bleeding problems, blood clots, bruising or jaundice  · Endocrine ROS: negative for - skin changes, temperature intolerance or unexpected weight changes  · Respiratory ROS: negative for - shortness of breath, cough, hemoptysis, pleuritic pain, sputum changes or wheezing. · Cardiovascular ROS: Per HPI. · Gastrointestinal ROS: negative for - abdominal pain, blood in stools, diarrhea, hematemesis, melena, nausea/vomiting or swallowing difficulty/pain  · Genito-Urinary ROS: negative for - dysuria or incontinence  · Musculoskeletal ROS: negative for - joint swelling or muscle pain  · Neurological ROS: negative for - confusion, dizziness, gait disturbance, headaches, numbness/tingling, seizures, speech problems, tremors, visual changes or weakness  · Dermatological ROS: negative for - rash    Physical Examination:  Vitals:    21 1343   BP: 130/70   Pulse: 81   SpO2: 92%       · Constitutional: Oriented. No distress. · Head: Normocephalic and atraumatic. · Mouth/Throat: Oropharynx is clear and moist.   · Eyes: Conjunctivae normal. EOM are normal.   · Neck: Normal range of motion. Neck supple. No rigidity. No JVD present. · Cardiovascular: Normal rate, regular rhythm, S1&S2 and intact distal pulses. · Pulmonary/Chest: Bilateral respiratory sounds. No wheezes. No rhonchi. · Abdominal: Soft. Bowel sounds present. No distension, No tenderness. · Musculoskeletal: No tenderness. No edema    · Lymphadenopathy: Has no cervical adenopathy. · Neurological: Alert and oriented. Cranial nerve appears intact, No Gross deficit   · Skin: Skin is warm and dry. No rash noted. · Psychiatric: Has a normal mood, affect and behavior     Labs:  Reviewed. EC2021 reviewed, sinus rhythm with occasional PAC  v-rate of 75 bpm with QRS duration 92 ms.  No pathologic Q waves, ventricular pre-excitation, or QT prolongation. Studies:   1. Event monitor: 5/19/2021-6/17/2021   SR with several episodes of atrial runs no atrial fibrillation or atrial flutter seen. 2. Echo: 1/2/2020  There is moderate concentric left ventricular hypertrophy. Patient appears to be in atrial fibrillation. Ejection fraction is estimated to be 50-60%. Indeterminate diastolic function. Mild aortic regurgitation. Mild tricuspid regurgitation. Mild mitral regurgitation with mitral annular calcification    3. Bartlett Malvin 2/19/2018   Summary    Abnormal study. There is a moderate sized, mild intensity, partially    reversible defect of the mid to apical anterior and mid anterolateral walls    which is consistent with ischemia. There is breast attenuation but stress    images appear worse.    Normal LV size and systolic function.    Overall findings represent an intermediate risk study       4. Cath: 3/5/2018  OVERALL IMPRESSION  1.  Again, 100% proximal right coronary artery occlusion with left to right collaterals. 2.  Mild disease of the distal left main trunk. 3.  20% to 30% ostial left anterior descending artery stenosis with collaterals from LAD to the right coronary artery. 4.  30% to 40% stenosis of the proximal circumflex artery. 5.  Normal left ventricular systolic function with estimated EF of 55% to 60%. 6.  Slightly enlarged aortic root with no evidence of aortic stenosis or regurgitation.     In view of the above findings, we will okay the patient for her upcoming  aortic aneurysm surgery from cardiac standpoint. [facility]: ([address]) 4/2017   of RCA chronic LAD 10-20% RA 4 PA24/9 16 wedge 9 LVEDP markedly elevated 30     Right Heart Cath 2/28/2020  1.  _____ normal right cardiac pressure. 2.  Elevated pulmonary capillary wedge pressure with a mean pulmonary  capillary wedge of 29 mmHg. 3.  Normal cardiac output and indices. 4.  No oxygen step off to suggest ASD/PFO.     6. Carotid US 10/2017 at [facility]:  1. Estimated diameter reduction of the right internal carotid artery is  less than 50%. 2.  Estimated diameter reduction of the left internal carotid artery is  50-69%. 3.  The bilateral common carotid arteries reveal no evidence of significant stenosis. 4.  There is greater than 50% stenosis of the right external carotid  artery. 5.  There is no evidence of significant stenosis in the left external  carotid artery. 6.  The bilateral vertebral arteries are patent with antegrade flow. 7.  The bilateral subclavian arteries reveal no evidence of significant  stenosis. 8.  There has been no significant change from the previous study of  4/21/2017.     I independently reviewed and interpreted the ECG, MCOT, echocardiogram, stress test, and coronary angiography/PCI results and used them for my plan of care. Procedures:  1. Endovascular AAA repair on 3/21/2018 by Dr. Howie Javier  2. Electrophysiology study with radiofrequency ablation of atrial fibrillation and pulmonary vein isolation 10/7/2020.   3. Successful external DC cardioversion of symptomatic, persistent atrial fibrillation 1/12/2021.  4. Electrophysiology study with radiofrequency ablation of left atrial flutter and pulmonary vein isolation 2/17/2021. Assessment/Plan:     Atrial fibrillation / left atrial flutter  -EKG today SR with PAC. -She has a [id number] Score 3 (HTN, AGE, female gender)  -On Xarelto 20 mg daily for  thromboembolic risk reduction.  -Tolerating well no signs or symptoms of abnormal bruising or bleeding.   -Advised that I would recommend that she continue on [address] [place] [address]  for at least 3 years, at which time, if there are no further recurrences of atrial fibrillation, the likelihood of recurrences subsequent to that would be very low.  We will then, at that time, consider discontinuing the oral anticoagulant.    -s/p radiofrequency ablation of atrial fibrillation on 10/7/2020.    -At her three months post ablation follow up appointment EKG confirmed atrial fibrillation/atrial flutter rate controlled at 72 bpm. She subsequently underwent cardioversion to restored NSR on 1/12/2021.     -On 2/17/2021 she underwent electrophysiology study with radiofrequency ablation of left atrial flutter and pulmonary vein isolation.    -She wore MARILU from 5/19/2021-6/17/2021 which revealed SR with several episodes of atrial runs no atrial fibrillation or atrial flutter seen.    -Discussed s/s to monitor for (fatigue, dyspnea) and the need for a 12 lead EKG should these symptoms develop, to evaluate whether there is recurrence of atrial fibrillation or new left atrial flutter. Much time was spent counseling the patient on healthier lifestyle, following a low sodium diet <2200 mg of sodium a day and dramatically decreasing the intake of processed sugar and avoiding artifical sweetners.    -Encouraged to watch \"That Sugar Film\" on [employer], which discusses the negative impact of processed sugar has on the body.    -Patient educated to eat a diet which includes organic fruits, vegetables and grass fed meats. Chronic diastolic heart failure  NYHA class II-III. Not on Aldactone due to elevated Creatine. LVEF 60 %   Per Echo 9/24/2020, grade II diastolic dysfunction with elevated LV filling pressures. On guideline directed medical therapy. Beta Blocker: Toprol  XL               Aldosterone Antagonist: Not on due to elevated creatine                     Diuretic: Torsemide              ARB/ACE/ARNI: Not on due to elevated creatine  Euvolemic on today's exam.  She follows with Dr. Lor Mcmillan for management. Coronary artery disease involving native coronary artery of native heart without angina pectoris  Denies angina. Continue with medical management and risk factor modification including statin, and beta blocker.     Hypertension  Stable    AAA (abdominal aortic aneurysm) without rupture   Underwent endovascular AAA repair on 3/21/18 by Dr. Suzan Correa. Follows with vascular surgery. Chronic obstructive pulmonary disease   Chronic shortness of breath. Centrilobular emphysema noted on CT scan. Managed by Dr. Skye Nieto MD.    Follow up with EP services PRN. Continue routine follow up with Dr. Hazel Lagos. Thank you for allowing me to participate in the care of Tyrell Rosa. All questions and concerns were addressed to the patient/family. Alternatives to my treatment were discussed. This note was scribed in the presence of Gayle Todd MD by Michele Lindsay RN. The scribe's documentation has been prepared under my direction and personally reviewed by me in its entirety. I confirm that the note above accurately reflects all work, physical examination, the discussion of treatments and procedures, and medical decision making performed by me.     Gayle Todd MD, MS, [address] [facility]  Cardiac Electrophysiology  [address]  [address] [facility], [address] [place], [place] [address]Jon [facility] [address]  [phone]

## 2021-08-23 ENCOUNTER — OFFICE VISIT (OUTPATIENT)
Dept: CARDIOLOGY CLINIC | Age: 76
End: 2021-08-23
Payer: MEDICARE

## 2021-08-23 VITALS
HEART RATE: 81 BPM | SYSTOLIC BLOOD PRESSURE: 130 MMHG | HEIGHT: 64 IN | WEIGHT: 222 LBS | DIASTOLIC BLOOD PRESSURE: 70 MMHG | BODY MASS INDEX: 37.9 KG/M2 | OXYGEN SATURATION: 92 %

## 2021-08-23 DIAGNOSIS — I25.10 CORONARY ARTERY DISEASE INVOLVING NATIVE CORONARY ARTERY OF NATIVE HEART WITHOUT ANGINA PECTORIS: ICD-10-CM

## 2021-08-23 DIAGNOSIS — I50.32 CHRONIC DIASTOLIC HEART FAILURE (HCC): ICD-10-CM

## 2021-08-23 DIAGNOSIS — I48.0 PAF (PAROXYSMAL ATRIAL FIBRILLATION) (HCC): Primary | ICD-10-CM

## 2021-08-23 DIAGNOSIS — Z79.01 CURRENT USE OF ANTICOAGULANT THERAPY: ICD-10-CM

## 2021-08-23 DIAGNOSIS — I48.92 LEFT ATRIAL FLUTTER BY ELECTROCARDIOGRAM (HCC): ICD-10-CM

## 2021-08-23 DIAGNOSIS — I10 ESSENTIAL HYPERTENSION: ICD-10-CM

## 2021-08-23 DIAGNOSIS — J44.9 CHRONIC OBSTRUCTIVE PULMONARY DISEASE, UNSPECIFIED COPD TYPE (HCC): ICD-10-CM

## 2021-08-23 DIAGNOSIS — I71.40 AAA (ABDOMINAL AORTIC ANEURYSM) WITHOUT RUPTURE: ICD-10-CM

## 2021-08-23 PROCEDURE — 1123F ACP DISCUSS/DSCN MKR DOCD: CPT | Performed by: INTERNAL MEDICINE

## 2021-08-23 PROCEDURE — 99214 OFFICE O/P EST MOD 30 MIN: CPT | Performed by: INTERNAL MEDICINE

## 2021-08-23 PROCEDURE — 4040F PNEUMOC VAC/ADMIN/RCVD: CPT | Performed by: INTERNAL MEDICINE

## 2021-08-23 PROCEDURE — G8417 CALC BMI ABV UP PARAM F/U: HCPCS | Performed by: INTERNAL MEDICINE

## 2021-08-23 PROCEDURE — 1036F TOBACCO NON-USER: CPT | Performed by: INTERNAL MEDICINE

## 2021-08-23 PROCEDURE — 3023F SPIROM DOC REV: CPT | Performed by: INTERNAL MEDICINE

## 2021-08-23 PROCEDURE — G8399 PT W/DXA RESULTS DOCUMENT: HCPCS | Performed by: INTERNAL MEDICINE

## 2021-08-23 PROCEDURE — 93000 ELECTROCARDIOGRAM COMPLETE: CPT | Performed by: INTERNAL MEDICINE

## 2021-08-23 PROCEDURE — 3017F COLORECTAL CA SCREEN DOC REV: CPT | Performed by: INTERNAL MEDICINE

## 2021-08-23 PROCEDURE — G8926 SPIRO NO PERF OR DOC: HCPCS | Performed by: INTERNAL MEDICINE

## 2021-08-23 PROCEDURE — 1090F PRES/ABSN URINE INCON ASSESS: CPT | Performed by: INTERNAL MEDICINE

## 2021-08-23 PROCEDURE — G8427 DOCREV CUR MEDS BY ELIG CLIN: HCPCS | Performed by: INTERNAL MEDICINE

## 2021-08-23 NOTE — PATIENT INSTRUCTIONS
Watch \"That Sugar Film\" online, which discusses the negative impact of processed sugar has on the body. Patient Education        Learning About Sleeping Well  What does sleeping well mean? Sleeping well means getting enough sleep. How much sleep is enough varies among people. The number of hours you sleep is not as important as how you feel when you wake up. If you do not feel refreshed, you probably need more sleep. Another sign of not getting enough sleep is feeling tired during the day. The average total nightly sleep time is 7½ to 8 hours. Healthy adults may need a little more or a little less than this. Why is getting enough sleep important? Getting enough quality sleep is a basic part of good health. When your sleep suffers, your mood and your thoughts can suffer too. You may find yourself feeling more grumpy or stressed. Not getting enough sleep also can lead to serious problems, including injury, accidents, anxiety, and depression. What might cause poor sleeping? Many things can cause sleep problems, including:  · Stress. Stress can be caused by fear about a single event, such as giving a speech. Or you may have ongoing stress, such as worry about work or school. · Depression, anxiety, and other mental or emotional conditions. · Changes in your sleep habits or surroundings. This includes changes that happen where you sleep, such as noise, light, or sleeping in a different bed. It also includes changes in your sleep pattern, such as having jet lag or working a late shift. · Health problems, such as pain, breathing problems, and restless legs syndrome. · Lack of regular exercise. How can you help yourself? Here are some tips that may help you sleep more soundly and wake up feeling more refreshed. Your sleeping area   · Use your bedroom only for sleeping and sex. A bit of light reading may help you fall asleep. But if it doesn't, do your reading elsewhere in the house.  Don't watch TV in bed.  · Be sure your bed is big enough to stretch out comfortably, especially if you have a sleep partner. · Keep your bedroom quiet, dark, and cool. Use curtains, blinds, or a sleep mask to block out light. To block out noise, use earplugs, soothing music, or a \"white noise\" machine. Your evening and bedtime routine   · Create a relaxing bedtime routine. You might want to take a warm shower or bath, listen to soothing music, or drink a cup of noncaffeinated tea. · Go to bed at the same time every night. And get up at the same time every morning, even if you feel tired. What to avoid   · Limit caffeine (coffee, tea, caffeinated sodas) during the day, and don't have any for at least 4 to 6 hours before bedtime. · Don't drink alcohol before bedtime. Alcohol can cause you to wake up more often during the night. · Don't smoke or use tobacco, especially in the evening. Nicotine can keep you awake. · Don't take naps during the day, especially close to bedtime. · Don't lie in bed awake for too long. If you can't fall asleep, or if you wake up in the middle of the night and can't get back to sleep within 15 minutes or so, get out of bed and go to another room until you feel sleepy. · Don't take medicine right before bed that may keep you awake or make you feel hyper or energized. Your doctor can tell you if your medicine may do this and if you can take it earlier in the day. If you can't sleep   · Imagine yourself in a peaceful, pleasant scene. Focus on the details and feelings of being in a place that is relaxing. · Get up and do a quiet or boring activity until you feel sleepy. · Don't drink any liquids after 6 p.m. if you wake up often because you have to go to the bathroom. Where can you learn more? Go to https://kee.[facility]. org and sign in to your [employer] account. Enter C298 in the [employer] box to learn more about \"Learning About Sleeping Well. \"     If you do not have an

## 2021-08-26 ENCOUNTER — TELEPHONE (OUTPATIENT)
Dept: FAMILY MEDICINE CLINIC | Age: 76
End: 2021-08-26

## 2021-08-26 RX ORDER — TRAZODONE HYDROCHLORIDE 50 MG/1
50 TABLET ORAL NIGHTLY PRN
Qty: 30 TABLET | Refills: 1 | Status: SHIPPED | OUTPATIENT
Start: 2021-08-26 | End: 2021-11-01 | Stop reason: SDUPTHER

## 2021-08-26 NOTE — TELEPHONE ENCOUNTER
----- [facility] [place] sent at 8/26/2021  3:42 PM EDT -----  Subject: Refill Request    QUESTIONS  Name of Medication? traZODone (DESYREL) 50 MG tablet  Patient-reported dosage and instructions? 1/2 pill every night as needed,   sometimes I take a whole pill  How many days do you have left? 0  Preferred Pharmacy? [facility]  Pharmacy phone number (if available)? [phone]  ---------------------------------------------------------------------------  --------------  Fouzia DUMONT  What is the best way for the office to contact you? OK to leave message on   voicemail  Preferred Call Back Phone Number?  [phone]

## 2021-08-26 NOTE — TELEPHONE ENCOUNTER
[id number] Kiana Gayle and she may take the whole pill  Script sent in    Orders Placed This Encounter   Medications    traZODone (DESYREL) 50 MG tablet     Sig: Take 1 tablet by mouth nightly as needed for Sleep     Dispense:  30 tablet     Refill:  1

## 2021-09-09 ENCOUNTER — APPOINTMENT (OUTPATIENT)
Dept: CT IMAGING | Age: 76
DRG: 292 | End: 2021-09-09
Payer: MEDICARE

## 2021-09-09 ENCOUNTER — APPOINTMENT (OUTPATIENT)
Dept: ULTRASOUND IMAGING | Age: 76
DRG: 292 | End: 2021-09-09
Payer: MEDICARE

## 2021-09-09 ENCOUNTER — HOSPITAL ENCOUNTER (INPATIENT)
Age: 76
LOS: 3 days | Discharge: HOME | DRG: 292 | End: 2021-09-12
Attending: EMERGENCY MEDICINE | Admitting: INTERNAL MEDICINE
Payer: MEDICARE

## 2021-09-09 ENCOUNTER — APPOINTMENT (OUTPATIENT)
Dept: GENERAL RADIOLOGY | Age: 76
DRG: 292 | End: 2021-09-09
Payer: MEDICARE

## 2021-09-09 DIAGNOSIS — I50.9 ACUTE ON CHRONIC CONGESTIVE HEART FAILURE, UNSPECIFIED HEART FAILURE TYPE (HCC): ICD-10-CM

## 2021-09-09 DIAGNOSIS — R77.8 ELEVATED TROPONIN: Primary | ICD-10-CM

## 2021-09-09 DIAGNOSIS — J90 PLEURAL EFFUSION ON RIGHT: ICD-10-CM

## 2021-09-09 LAB
A/G RATIO: 1.6 (ref 1.1–2.2)
ALBUMIN SERPL-MCNC: 4.1 G/DL (ref 3.4–5)
ALP BLD-CCNC: 92 U/L (ref 40–129)
ALT SERPL-CCNC: 12 U/L (ref 10–40)
ANION GAP SERPL CALCULATED.3IONS-SCNC: 9 MMOL/L (ref 3–16)
AST SERPL-CCNC: 17 U/L (ref 15–37)
BASOPHILS ABSOLUTE: 0 K/UL (ref 0–0.2)
BASOPHILS RELATIVE PERCENT: 0.2 %
BILIRUB SERPL-MCNC: 0.5 MG/DL (ref 0–1)
BILIRUBIN URINE: NEGATIVE
BLOOD, URINE: ABNORMAL
BUN BLDV-MCNC: 13 MG/DL (ref 7–20)
CALCIUM SERPL-MCNC: 9.8 MG/DL (ref 8.3–10.6)
CHLORIDE BLD-SCNC: 100 MMOL/L (ref 99–110)
CLARITY: CLEAR
CO2: 30 MMOL/L (ref 21–32)
COLOR: YELLOW
CREAT SERPL-MCNC: 0.9 MG/DL (ref 0.6–1.2)
EKG ATRIAL RATE: 64 BPM
EKG DIAGNOSIS: NORMAL
EKG P AXIS: 66 DEGREES
EKG P-R INTERVAL: 202 MS
EKG Q-T INTERVAL: 412 MS
EKG QRS DURATION: 74 MS
EKG QTC CALCULATION (BAZETT): 425 MS
EKG R AXIS: -14 DEGREES
EKG T AXIS: 41 DEGREES
EKG VENTRICULAR RATE: 64 BPM
EOSINOPHILS ABSOLUTE: 0.2 K/UL (ref 0–0.6)
EOSINOPHILS RELATIVE PERCENT: 2 %
EPITHELIAL CELLS, UA: 1 /HPF (ref 0–5)
GFR AFRICAN AMERICAN: >60
GFR NON-AFRICAN AMERICAN: >60
GLOBULIN: 2.6 G/DL
GLUCOSE BLD-MCNC: 113 MG/DL (ref 70–99)
GLUCOSE URINE: NEGATIVE MG/DL
HCT VFR BLD CALC: 35.2 % (ref 36–48)
HEMOGLOBIN: 11.4 G/DL (ref 12–16)
HYALINE CASTS: 0 /LPF (ref 0–8)
KETONES, URINE: NEGATIVE MG/DL
LEUKOCYTE ESTERASE, URINE: NEGATIVE
LYMPHOCYTES ABSOLUTE: 0.7 K/UL (ref 1–5.1)
LYMPHOCYTES RELATIVE PERCENT: 6.1 %
MCH RBC QN AUTO: 28.1 PG (ref 26–34)
MCHC RBC AUTO-ENTMCNC: 32.3 G/DL (ref 31–36)
MCV RBC AUTO: 87 FL (ref 80–100)
MICROSCOPIC EXAMINATION: YES
MONOCYTES ABSOLUTE: 0.7 K/UL (ref 0–1.3)
MONOCYTES RELATIVE PERCENT: 6.7 %
NEUTROPHILS ABSOLUTE: 9.5 K/UL (ref 1.7–7.7)
NEUTROPHILS RELATIVE PERCENT: 85 %
NITRITE, URINE: NEGATIVE
PDW BLD-RTO: 17.3 % (ref 12.4–15.4)
PH UA: 6.5 (ref 5–8)
PLATELET # BLD: 180 K/UL (ref 135–450)
PMV BLD AUTO: 9 FL (ref 5–10.5)
POTASSIUM REFLEX MAGNESIUM: 4.2 MMOL/L (ref 3.5–5.1)
PRO-BNP: 3559 PG/ML (ref 0–449)
PROTEIN UA: 100 MG/DL
RBC # BLD: 4.05 M/UL (ref 4–5.2)
RBC UA: 1 /HPF (ref 0–4)
SODIUM BLD-SCNC: 139 MMOL/L (ref 136–145)
SPECIFIC GRAVITY UA: 1.01 (ref 1–1.03)
TOTAL PROTEIN: 6.7 G/DL (ref 6.4–8.2)
TROPONIN: 0.02 NG/ML
TROPONIN: 0.02 NG/ML
TROPONIN: <0.01 NG/ML
URINE REFLEX TO CULTURE: ABNORMAL
URINE TYPE: ABNORMAL
UROBILINOGEN, URINE: 0.2 E.U./DL
WBC # BLD: 11.2 K/UL (ref 4–11)
WBC UA: 1 /HPF (ref 0–5)

## 2021-09-09 PROCEDURE — 76536 US EXAM OF HEAD AND NECK: CPT

## 2021-09-09 PROCEDURE — 6370000000 HC RX 637 (ALT 250 FOR IP)

## 2021-09-09 PROCEDURE — 94761 N-INVAS EAR/PLS OXIMETRY MLT: CPT

## 2021-09-09 PROCEDURE — 99285 EMERGENCY DEPT VISIT HI MDM: CPT

## 2021-09-09 PROCEDURE — 93010 ELECTROCARDIOGRAM REPORT: CPT | Performed by: INTERNAL MEDICINE

## 2021-09-09 PROCEDURE — 6370000000 HC RX 637 (ALT 250 FOR IP): Performed by: NURSE PRACTITIONER

## 2021-09-09 PROCEDURE — 99222 1ST HOSP IP/OBS MODERATE 55: CPT | Performed by: INTERNAL MEDICINE

## 2021-09-09 PROCEDURE — 85025 COMPLETE CBC W/AUTO DIFF WBC: CPT

## 2021-09-09 PROCEDURE — 83880 ASSAY OF NATRIURETIC PEPTIDE: CPT

## 2021-09-09 PROCEDURE — 6360000002 HC RX W HCPCS: Performed by: NURSE PRACTITIONER

## 2021-09-09 PROCEDURE — 84484 ASSAY OF TROPONIN QUANT: CPT

## 2021-09-09 PROCEDURE — 6360000002 HC RX W HCPCS: Performed by: INTERNAL MEDICINE

## 2021-09-09 PROCEDURE — 81001 URINALYSIS AUTO W/SCOPE: CPT

## 2021-09-09 PROCEDURE — 71046 X-RAY EXAM CHEST 2 VIEWS: CPT

## 2021-09-09 PROCEDURE — 6370000000 HC RX 637 (ALT 250 FOR IP): Performed by: INTERNAL MEDICINE

## 2021-09-09 PROCEDURE — 71250 CT THORAX DX C-: CPT

## 2021-09-09 PROCEDURE — 36415 COLL VENOUS BLD VENIPUNCTURE: CPT

## 2021-09-09 PROCEDURE — 94640 AIRWAY INHALATION TREATMENT: CPT

## 2021-09-09 PROCEDURE — 2580000003 HC RX 258: Performed by: INTERNAL MEDICINE

## 2021-09-09 PROCEDURE — 2700000000 HC OXYGEN THERAPY PER DAY

## 2021-09-09 PROCEDURE — 93005 ELECTROCARDIOGRAM TRACING: CPT | Performed by: EMERGENCY MEDICINE

## 2021-09-09 PROCEDURE — 80053 COMPREHEN METABOLIC PANEL: CPT

## 2021-09-09 PROCEDURE — 1200000000 HC SEMI PRIVATE

## 2021-09-09 RX ORDER — HYDRALAZINE HYDROCHLORIDE 50 MG/1
50 TABLET, FILM COATED ORAL EVERY 8 HOURS SCHEDULED
Status: DISCONTINUED | OUTPATIENT
Start: 2021-09-09 | End: 2021-09-12

## 2021-09-09 RX ORDER — FUROSEMIDE 10 MG/ML
40 INJECTION INTRAMUSCULAR; INTRAVENOUS DAILY
Status: DISCONTINUED | OUTPATIENT
Start: 2021-09-09 | End: 2021-09-09

## 2021-09-09 RX ORDER — ISOSORBIDE MONONITRATE 30 MG/1
30 TABLET, EXTENDED RELEASE ORAL DAILY
Status: DISCONTINUED | OUTPATIENT
Start: 2021-09-10 | End: 2021-09-12 | Stop reason: HOSPADM

## 2021-09-09 RX ORDER — ROPINIROLE 0.5 MG/1
0.5 TABLET, FILM COATED ORAL NIGHTLY
Status: DISCONTINUED | OUTPATIENT
Start: 2021-09-09 | End: 2021-09-12 | Stop reason: HOSPADM

## 2021-09-09 RX ORDER — IPRATROPIUM BROMIDE AND ALBUTEROL SULFATE 2.5; .5 MG/3ML; MG/3ML
2 SOLUTION RESPIRATORY (INHALATION) ONCE
Status: COMPLETED | OUTPATIENT
Start: 2021-09-09 | End: 2021-09-09

## 2021-09-09 RX ORDER — CLOBETASOL PROPIONATE 0.5 MG/G
CREAM TOPICAL 2 TIMES DAILY PRN
COMMUNITY
Start: 2021-08-31 | End: 2022-07-27

## 2021-09-09 RX ORDER — SODIUM CHLORIDE 9 MG/ML
25 INJECTION, SOLUTION INTRAVENOUS PRN
Status: DISCONTINUED | OUTPATIENT
Start: 2021-09-09 | End: 2021-09-12 | Stop reason: HOSPADM

## 2021-09-09 RX ORDER — ACETAMINOPHEN 650 MG/1
650 SUPPOSITORY RECTAL EVERY 6 HOURS PRN
Status: DISCONTINUED | OUTPATIENT
Start: 2021-09-09 | End: 2021-09-12 | Stop reason: HOSPADM

## 2021-09-09 RX ORDER — FUROSEMIDE 10 MG/ML
40 INJECTION INTRAMUSCULAR; INTRAVENOUS 2 TIMES DAILY
Status: DISCONTINUED | OUTPATIENT
Start: 2021-09-10 | End: 2021-09-10

## 2021-09-09 RX ORDER — METOPROLOL SUCCINATE 50 MG/1
50 TABLET, EXTENDED RELEASE ORAL DAILY
Status: DISCONTINUED | OUTPATIENT
Start: 2021-09-10 | End: 2021-09-12 | Stop reason: HOSPADM

## 2021-09-09 RX ORDER — ROSUVASTATIN CALCIUM 20 MG/1
20 TABLET, COATED ORAL DAILY
Status: DISCONTINUED | OUTPATIENT
Start: 2021-09-10 | End: 2021-09-12 | Stop reason: HOSPADM

## 2021-09-09 RX ORDER — ONDANSETRON 4 MG/1
4 TABLET, ORALLY DISINTEGRATING ORAL EVERY 8 HOURS PRN
Status: DISCONTINUED | OUTPATIENT
Start: 2021-09-09 | End: 2021-09-12 | Stop reason: HOSPADM

## 2021-09-09 RX ORDER — TRAZODONE HYDROCHLORIDE 50 MG/1
50 TABLET ORAL NIGHTLY PRN
Status: DISCONTINUED | OUTPATIENT
Start: 2021-09-09 | End: 2021-09-12 | Stop reason: HOSPADM

## 2021-09-09 RX ORDER — SODIUM CHLORIDE 0.9 % (FLUSH) 0.9 %
5-40 SYRINGE (ML) INJECTION PRN
Status: DISCONTINUED | OUTPATIENT
Start: 2021-09-09 | End: 2021-09-12 | Stop reason: HOSPADM

## 2021-09-09 RX ORDER — LISINOPRIL 5 MG/1
5 TABLET ORAL DAILY
Status: DISCONTINUED | OUTPATIENT
Start: 2021-09-09 | End: 2021-09-10

## 2021-09-09 RX ORDER — ROPINIROLE 0.5 MG/1
TABLET, FILM COATED ORAL
Status: COMPLETED
Start: 2021-09-09 | End: 2021-09-09

## 2021-09-09 RX ORDER — BUDESONIDE AND FORMOTEROL FUMARATE DIHYDRATE 160; 4.5 UG/1; UG/1
2 AEROSOL RESPIRATORY (INHALATION) 2 TIMES DAILY
Status: DISCONTINUED | OUTPATIENT
Start: 2021-09-09 | End: 2021-09-12 | Stop reason: HOSPADM

## 2021-09-09 RX ORDER — ACETAMINOPHEN 325 MG/1
650 TABLET ORAL EVERY 6 HOURS PRN
Status: DISCONTINUED | OUTPATIENT
Start: 2021-09-09 | End: 2021-09-12 | Stop reason: HOSPADM

## 2021-09-09 RX ORDER — ONDANSETRON 2 MG/ML
4 INJECTION INTRAMUSCULAR; INTRAVENOUS EVERY 6 HOURS PRN
Status: DISCONTINUED | OUTPATIENT
Start: 2021-09-09 | End: 2021-09-12 | Stop reason: HOSPADM

## 2021-09-09 RX ORDER — NITROGLYCERIN 0.4 MG/1
0.4 TABLET SUBLINGUAL EVERY 5 MIN PRN
Status: DISCONTINUED | OUTPATIENT
Start: 2021-09-09 | End: 2021-09-12 | Stop reason: HOSPADM

## 2021-09-09 RX ORDER — ALBUTEROL SULFATE 2.5 MG/3ML
2.5 SOLUTION RESPIRATORY (INHALATION) 4 TIMES DAILY
Status: DISCONTINUED | OUTPATIENT
Start: 2021-09-10 | End: 2021-09-12 | Stop reason: HOSPADM

## 2021-09-09 RX ORDER — ALBUTEROL SULFATE 2.5 MG/3ML
2.5 SOLUTION RESPIRATORY (INHALATION) EVERY 4 HOURS PRN
Status: DISCONTINUED | OUTPATIENT
Start: 2021-09-09 | End: 2021-09-09

## 2021-09-09 RX ORDER — FUROSEMIDE 10 MG/ML
40 INJECTION INTRAMUSCULAR; INTRAVENOUS ONCE
Status: COMPLETED | OUTPATIENT
Start: 2021-09-09 | End: 2021-09-09

## 2021-09-09 RX ORDER — POLYETHYLENE GLYCOL 3350 17 G/17G
17 POWDER, FOR SOLUTION ORAL DAILY PRN
Status: DISCONTINUED | OUTPATIENT
Start: 2021-09-09 | End: 2021-09-12 | Stop reason: HOSPADM

## 2021-09-09 RX ORDER — SODIUM CHLORIDE 0.9 % (FLUSH) 0.9 %
5-40 SYRINGE (ML) INJECTION EVERY 12 HOURS SCHEDULED
Status: DISCONTINUED | OUTPATIENT
Start: 2021-09-09 | End: 2021-09-12 | Stop reason: HOSPADM

## 2021-09-09 RX ORDER — ALBUTEROL SULFATE 90 UG/1
2 AEROSOL, METERED RESPIRATORY (INHALATION) EVERY 4 HOURS PRN
Status: DISCONTINUED | OUTPATIENT
Start: 2021-09-09 | End: 2021-09-12 | Stop reason: HOSPADM

## 2021-09-09 RX ORDER — PREDNISONE 20 MG/1
40 TABLET ORAL ONCE
Status: COMPLETED | OUTPATIENT
Start: 2021-09-09 | End: 2021-09-09

## 2021-09-09 RX ADMIN — BUDESONIDE AND FORMOTEROL FUMARATE DIHYDRATE 2 PUFF: 160; 4.5 AEROSOL RESPIRATORY (INHALATION) at 19:45

## 2021-09-09 RX ADMIN — HYDRALAZINE HYDROCHLORIDE 50 MG: 50 TABLET, FILM COATED ORAL at 22:51

## 2021-09-09 RX ADMIN — HYDRALAZINE HYDROCHLORIDE 50 MG: 50 TABLET, FILM COATED ORAL at 15:56

## 2021-09-09 RX ADMIN — FUROSEMIDE 40 MG: 10 INJECTION, SOLUTION INTRAMUSCULAR; INTRAVENOUS at 12:42

## 2021-09-09 RX ADMIN — ALBUTEROL SULFATE 2.5 MG: 2.5 SOLUTION RESPIRATORY (INHALATION) at 19:45

## 2021-09-09 RX ADMIN — TRAZODONE HYDROCHLORIDE 50 MG: 50 TABLET ORAL at 23:18

## 2021-09-09 RX ADMIN — ACETAMINOPHEN 650 MG: 325 TABLET ORAL at 15:56

## 2021-09-09 RX ADMIN — PREDNISONE 40 MG: 20 TABLET ORAL at 11:50

## 2021-09-09 RX ADMIN — IPRATROPIUM BROMIDE AND ALBUTEROL SULFATE 2 AMPULE: .5; 3 SOLUTION RESPIRATORY (INHALATION) at 11:27

## 2021-09-09 RX ADMIN — SODIUM CHLORIDE, PRESERVATIVE FREE 10 ML: 5 INJECTION INTRAVENOUS at 22:51

## 2021-09-09 RX ADMIN — ROPINIROLE HYDROCHLORIDE 0.5 MG: 0.5 TABLET, FILM COATED ORAL at 23:18

## 2021-09-09 RX ADMIN — FUROSEMIDE 40 MG: 10 INJECTION, SOLUTION INTRAMUSCULAR; INTRAVENOUS at 18:20

## 2021-09-09 ASSESSMENT — PAIN SCALES - GENERAL
PAINLEVEL_OUTOF10: 6
PAINLEVEL_OUTOF10: 6
PAINLEVEL_OUTOF10: 3

## 2021-09-09 NOTE — ED PROVIDER NOTES
I have personally performed a face to face diagnostic evaluation on this patient. I have fully participated in the care of this patient. I have reviewed and agree with all pertinent clinical information including history, physical exam, diagnostic tests, and the plan. HPI: Lorri Gonzalez presented with shortness of breath similar to previous episodes of COPD exacerbation. This has been going on for a couple of days. Does wear oxygen at home. No obvious Covid exposures. Patient otherwise feels better after breathing treatment. Patient with some wheezing. No chest pain at this time. Chief Complaint   Patient presents with    Shortness of Breath     for a couple of days, wears o2 at home, no known exposure to anyone with covid. Review of Systems: See LIS note  Vital Signs: BP (!) 182/72   Pulse 64   Temp 97.8 °F (36.6 °C) (Oral)   Resp 28   Wt 226 lb 13.7 oz (102.9 kg)   SpO2 100%   BMI 38.94 kg/m²     Alert 77 y.o. female who does not appear toxic or acutely ill  HENT: Atraumatic, oral mucosa moist  Neck: Grossly normal ROM  Chest/Lungs: respiratory effort normal mild bilateral wheezing  Abdomen: Soft nontender  Extremities: 2+ radial bilaterally  Musculoskeletal: Grossly normal ROM  Skin: No palor or diaphoresis    Medical Decision Making and Plan:  Pertinent Labs & Imaging studies reviewed. (See LIS chart for details)  I agree with assessment and plan. Concern for COPD exacerbation versus cardiac etiology. Will give steroids and breathing treatments and reevaluate. Will also order cardiac work-up. See LIS note for further details. Update  Patient with elevated BNP and signs of fluid overload.   Will admit for IV diuresis and work-up of possible new CHF    EKG Interpretation    Interpreted by emergency department physician     Rhythm: normal sinus   Rate: normal  Axis: left  Ectopy: none  Conduction: normal  ST Segments: nonspecific changes  T Waves: no acute change  Q Waves: none    Clinical Impression: no acute changes    MD Tatiana Barrett MD  09/09/21 2091

## 2021-09-09 NOTE — ED NOTES
Spoke to Monique Puri, [address] on floor to assume care. Denies further questions.       Arnulfo Primrose, CABRERA  09/09/21 [id number]

## 2021-09-09 NOTE — PROGRESS NOTES
4 Eyes Skin Assessment     NAME:  Christopher Nunes  YOB: 1945  MEDICAL RECORD NUMBER:  [id number]    The patient is being assess for  Admission    I agree that 2 RN's have performed a thorough Head to Toe Skin Assessment on the patient. ALL assessment sites listed below have been assessed. Areas assessed by both nurses:    Head, Face, Ears, Shoulders, Back, Chest, Arms, Elbows, Hands, Sacrum. Buttock, Coccyx, Ischium and Legs. Feet and Heels        Does the Patient have a Wound?  No noted wound(s)       Braydon Prevention initiated:  Yes   Wound Care Orders initiated:  No    Pressure Injury (Stage 3,4, Unstageable, DTI, NWPT, and Complex wounds) if present place consult order under [de-identified] No    New and Established Ostomies if present place consult order under : No      Nurse 1 eSignature: Electronically signed by Nikko Abraham RN on 9/9/21 at 4:56 PM EDT    **SHARE this note so that the co-signing nurse is able to place an eSignature**    Nurse 2 eSignature: {Esignature:[id number]}

## 2021-09-09 NOTE — PROGRESS NOTES
Admitted 77 y.o female to room 4129 from the emergency room. A&O.  at bedside. Oriented to room, call light, TV, and phone. Patient able to use call light to express needs. Bed to lowest position with door open and call light in reach. All fall precautions implemented at this time. Will continue to monitor.

## 2021-09-09 NOTE — ED NOTES
Bed: -30  Expected date:   Expected time:   Means of arrival: [facility]  Comments:  76f  sob     Elver Law RN  09/09/21 105

## 2021-09-09 NOTE — RT PROTOCOL NOTE
RT Inhaler-Nebulizer Bronchodilator Protocol Note    There is a bronchodilator order in the chart from a provider indicating to follow the RT Bronchodilator Protocol and there is an Initiate RT Bronchodilator Protocol order as well (see protocol at bottom of note). The findings from the last RT Protocol Assessment were as follows:  Smoking: >15 Pack years  Surgical Status: No surgery  Xray: One lobe infiltrates/atelectasis/pleural effusion/edema  Respiratory Pattern: Dyspnea at rest  Mental Status: Alert and Oriented  Breath Sounds: Wheezing and/or rhonchi  Cough: Weak, non-productive  Activity Level: Walking with assistance  Oxygen Requirement: 29% or 3LNC - 5LNC/40%  Indication for Bronchodilator Therapy: Wheezing associated with pulm disorder  Bronchodilator Assessment Score: 13    Aerosolized bronchodilator medication orders have been revised according to the RT Bronchodilator Protocol. RT Inhaler-Nebulizer Bronchodilator Protocol:    Respiratory Therapist to perform RT Therapy Protocol Assessment then follow the protocol. No Indications - adjust the frequency to every 6 hours PRN wheezing or bronchospasm, if no treatments needed after 48 hours then discontinue using Per Protocol order mode. If indication present, adjust the RT bronchodilator orders based on the Bronchodilator Assessment Score as follows:    0-6 - enter or revise RT bronchodilator order to Albuterol Inhaler order with frequency of every 2 hours PRN for wheezing or increased work of breathing using Per Protocol order mode. If Albuterol Inhaler not tolerated or not effective, then discontinue the Albuterol Inhaler order and enter Albuterol Nebulizer order with same frequency and PRN reasons. Repeat RT Therapy Protocol Assessment as needed.     7-10 - discontinue any other Inpatient aerosolized bronchodilator medication orders and enter or revise two Albuterol Inhaler orders, one with BID frequency and one with frequency of every 2 hours PRN wheezing or increased work of breathing using Per Protocol order mode. Repeat RT Therapy Protocol Assessment with second treatment then BID and as needed. If Albuterol Inhaler not tolerated or not effective, then discontinue the Albuterol Inhaler orders and enter two Albuterol Nebulizer orders with same frequencies and PRN reasons. 11-13 - discontinue any other Inpatient aerosolized bronchodilator medication orders and enter DuoNeb Nebulizer orders QID frequency and an Albuterol Nebulizer order every 2 hours PRN wheezing or increased work of breathing using Per Protocol order mode. Repeat RT Therapy Protocol Assessment with second treatment then QID and as needed. Greater than 13 - discontinue any other Inpatient bronchodilator aerosolized medication orders and enter DuoNeb Nebulizer order every 4 hours frequency and Albuterol Nebulizer every 2 hours PRN wheezing or increased work of breathing using Per Protocol order mode. Repeat RT Therapy Protocol Assessment with second treatment then every 4 hours and as needed. RT to enter RT Home Evaluation for COPD & MDI Assessment order using Per Protocol order mode.     Electronically signed by Bri Flores RCP on 9/9/2021 at 7:51 PM

## 2021-09-09 NOTE — H&P
Hospital Medicine History & Physical      PCP: Libertad Lemons MD    Date of Admission: 9/9/2021    Date of Service: Pt seen/examined on 09/09/2021 and Admitted to Inpatient with expected LOS greater than two midnights due to medical therapy. Chief Complaint: Shortness of breath, paroxysmal nocturnal dyspnea, orthopnea, worsening lower extremity edema      History Of Present Illness:      77 y.o. female who presented to [facility] with 3 to 4 days history of worsening shortness of breath, worse with any ambulation, better when she is sitting still no obvious trigger, in the context of chronic congestive heart failure, denies any recent change to her medication, associated with generalized weakness, paroxysmal nocturnal dyspnea, orthopnea and increased edema in both her lower extremity, denies nausea vomiting denies chest pain denies fever or chills. In emergency department found to have elevated BNP, pleural effusion and minimally elevated troponin, patient being admitted for further management and treatment    Past Medical History:          Diagnosis Date    AAA (abdominal aortic aneurysm) ([address]     pt states it is 4cm    AAA (abdominal aortic aneurysm) without rupture ([address] 2/10/2015    Atrial fibrillation (HCC)     CAD (coronary artery disease)     CHF (congestive heart failure) ([address]     COPD (chronic obstructive pulmonary disease) (HCC)     History of blood clots     Hyperlipidemia     Hypertension        Past Surgical History:          Procedure Laterality Date    ABDOMINAL AORTIC ANEURYSM REPAIR      Endovascular abdominal AA    APPENDECTOMY  1990    incidental    BLADDER SUSPENSION      CATARACT REMOVAL      CHOLECYSTECTOMY  10/15/13    COLONOSCOPY  9/2/07    dr Zuri Rhodes and check in 5 years.  COLONOSCOPY  06/16/2017    ok dr arndt, repeat 5 years   [address]    for benign tumor.  just the uterus    JOINT REPLACEMENT  12/2013    right knee replacement    TONSILLECTOMY as a child    TUMOR EXCISION      benign behind right ear about 2008       Medications Prior to Admission:      Prior to Admission medications    Medication Sig Start Date End Date Taking?  Authorizing Provider   traZODone (DESYREL) 50 MG tablet Take 1 tablet by mouth nightly as needed for Sleep 8/26/21   Ric Mancera MD   isosorbide mononitrate (IMDUR) 30 MG extended release tablet TAKE ONE TABLET BY MOUTH DAILY 8/10/21   Dejuan Manzo MD   albuterol (PROVENTIL) (2.5 MG/3ML) 0.083% nebulizer solution Take 3 mLs by nebulization every 4 hours as needed for Wheezing 7/8/21   CHRISTINE Moulton - CNP   metoprolol succinate (TOPROL XL) 50 MG extended release tablet Take 50 mg by mouth daily    Historical Provider, MD   torsemide (DEMADEX) 20 MG tablet Take 40 mg by mouth daily    Historical Provider, MD   budesonide-formoterol (SYMBICORT) 160-4.5 MCG/ACT AERO Inhale 2 puffs into the lungs 2 times daily    Historical Provider, MD   rivaroxaban (XARELTO) 20 MG TABS tablet TAKE ONE TABLET BY MOUTH DAILY WITH BREAKFAST  Patient taking differently: Take 20 mg by mouth daily (with breakfast) TAKE ONE TABLET BY MOUTH DAILY WITH BREAKFAST 6/14/21   Kim Chang DO   NIFEdipine (ADALAT CC) 60 MG extended release tablet TAKE ONE TABLET BY MOUTH DAILY 3/2/21   Dejuan Manzo MD   rosuvastatin (CRESTOR) 20 MG tablet Take 1 tablet by mouth daily 8/13/20   Bonny August MD   albuterol sulfate HFA (PROAIR HFA) 108 (90 Base) MCG/ACT inhaler Inhale 2 puffs into the lungs every 4 hours as needed for Wheezing or Shortness of Breath 7/23/20   Kim Chang DO   hydrALAZINE (APRESOLINE) 50 MG tablet Take 1 tablet by mouth every 8 hours 2/29/20   Stephanie Jaramillo MD   nitroGLYCERIN (NITROSTAT) 0.4 MG SL tablet Place 1 tablet under the tongue every 5 minutes as needed for Chest pain 3/5/19   Ric Mancera MD   Multiple Vitamins-Minerals (MULTI FOR HER 50+ PO) Take 1 tablet by mouth daily    Historical Provider, MD       Allergies:  Morphine and Other    Social History:      The patient currently lives at home    TOBACCO:   reports that she quit smoking about 7 years ago. Her smoking use included cigarettes. She started smoking about 44 years ago. She has a 37.00 pack-year smoking history. She has never used smokeless tobacco.  ETOH:   reports no history of alcohol use. E-Cigarettes/Vaping Use     Questions Responses    E-Cigarette/Vaping Use Former User    Start Date     Passive Exposure     Quit Date     Counseling Given Yes    Comments             Family History:      Reviewed in detail and positive as follows:        Problem Relation Age of Onset    Heart Disease Father     Hypertension Other        REVIEW OF SYSTEMS COMPLETED:   Pertinent positives as noted in the HPI. All other systems reviewed and negative. PHYSICAL EXAM PERFORMED:    BP (!) 182/72   Pulse 64   Temp 97.8 °F (36.6 °C) (Oral)   Resp 28   Wt 226 lb 13.7 oz (102.9 kg)   SpO2 100%   BMI 38.94 kg/m²     General appearance:  No apparent distress  HEENT:  Normal cephalic  Neck: Supple  Respiratory: Bilateral basilar crackles  Cardiovascular:  Regular rate and rhythm with normal S1/S2 without murmurs, rubs or gallops. Abdomen: Soft, non-tender, non-distended with normal bowel sounds. Musculoskeletal:  No clubbing. Bilateral leg edema  Skin: Skin color, texture, turgor normal.  No rashes or lesions. Neurologic: No focal weakness  Psychiatric:  Alert and oriented  Capillary Refill: Brisk,3 seconds, normal  Peripheral Pulses: +2 palpable, equal bilaterally       Labs:     Recent Labs     09/09/21  1113   WBC 11.2*   HGB 11.4*   HCT 35.2*        Recent Labs     09/09/21  1113      K 4.2      CO2 30   BUN 13   CREATININE 0.9   CALCIUM 9.8     Recent Labs     09/09/21  1113   AST 17   ALT 12   BILITOT 0.5   ALKPHOS 92     No results for input(s): INR in the last 72 hours.   Recent Labs     09/09/21  1113   TROPONINI 0.02* Urinalysis:      Lab Results   Component Value Date    NITRU Negative 09/09/2021    WBCUA 1 09/09/2021    BACTERIA 1+ 09/24/2020    RBCUA 1 09/09/2021    BLOODU TRACE 09/09/2021    SPECGRAV 1.010 09/09/2021    GLUCOSEU Negative 09/09/2021       Radiology:     CXR: I have reviewed the CXR with the following interpretation: Pleural effusion  EKG:  I have reviewed the EKG with the following interpretation: No ST elevation    XR CHEST (2 VW)   Final Result   Small right pleural effusion with adjacent atelectasis. Stable cardiomegaly. CT CHEST WO CONTRAST    (Results Pending)       ASSESSMENT:    Active Hospital Problems    Diagnosis Date Noted    Coronary artery disease [I25.10] 09/09/2011     Priority: High     Class: Chronic    Essential hypertension [I10] 09/09/2011     Priority: Medium     Class: Chronic    Acute on chronic congestive heart failure (HCC) [I50.9] 09/24/2020    PAF (paroxysmal atrial fibrillation) (HCC) [I48.0]     Acute on chronic diastolic heart failure (HCC) [I50.33]     Pleural effusion [J90]          PLAN:    1. Acute on chronic congestive heart failure likely diastolic, could be triggered by A. fib, no recent change to medication, patient will be admitted to the hospital, IV Lasix initiated, telemetry monitoring, strict I&O, resume home medication, add lisinopril, cardiology consulted for further recommendations. Plan of care discussed with patient in detail, all questions answered  2. Chronic respiratory failure with hypoxia, on 3 L of oxygen at home  3. Paroxysmal atrial fibrillation, on oral anticoagulation  4. Essential hypertension, resume p.o. medications  5. Oral effusion, likely due to heart failure, diuretics for now, no immediate indication for thoracentesis  6. Generalized weakness, due to above  7.   Heterogeneous thyroid, found on CT scan, ultrasound ordered likely will need follow-up as outpatient      Diet: Diet NPO Effective Now  Code Status: Prior        Dispo -inpatient 2 to 3 days       Young Main MD    Thank you Katie Henderson MD for the opportunity to be involved in this patient's care. If you have any questions or concerns please feel free to contact me at [phone].

## 2021-09-09 NOTE — ED TRIAGE NOTES
Pt presents to ED with c/o of SOB x2 days. Associated cough. +copd. VSS. Resp even and unlabored. A/ox4. No acute distress noted. Denies any need at this time. Call light within reach. Bed in lowest position. Will continue to monitor.

## 2021-09-09 NOTE — DISCHARGE INSTR - COC
Continuity of Care Form    Patient Name: Hampton Bumpers   :  1945  MRN:  [id number]    Admit date:  2021  Discharge date:  2021    Code Status Order: Full Code   Advance Directives:     Admitting Physician:  Young Main MD  PCP: Katie Henderson MD    Discharging Nurse: Katie Florence [facility] Unit/Room#: [id number]-[id number]/6504-24  Discharging Unit Phone Number: [phone]    Emergency Contact:   Extended Emergency Contact Information  Primary Emergency Contact: Linda Perez  Address: [address] [facility] [facility], [address] Phone: [phone]  Relation: Spouse  Secondary Emergency Contact: Esperanza Amaro [address] Phone: [phone]  Relation: Child    Past Surgical History:  Past Surgical History:   Procedure Laterality Date    ABDOMINAL AORTIC ANEURYSM REPAIR      Endovascular abdominal AA    APPENDECTOMY      incidental    BLADDER SUSPENSION      CATARACT REMOVAL      CHOLECYSTECTOMY  10/15/13    COLONOSCOPY  07    dr Harinder King and check in 5 years.  COLONOSCOPY  2017    ok dr arndt, repeat 5 years   [address]    for benign tumor.  just the uterus    JOINT REPLACEMENT  2013    right knee replacement    TONSILLECTOMY  as a child    TUMOR EXCISION      benign behind right ear about        Immunization History:   Immunization History   Administered Date(s) Administered    COVID-19, Pfizer, PF, 30mcg/0.3mL 2021, 2021    Influenza Vaccine, unspecified formulation 2018    Influenza, Quadv, IM, PF (6 mo and older Fluzone, Flulaval, Fluarix, and 3 yrs and older Afluria) 10/08/2020    Pneumococcal Conjugate 13-valent ([id number]) 2017    Pneumococcal Polysaccharide ([id number]) 2015       Active Problems:  Patient Active Problem List   Diagnosis Code    Essential hypertension I10    Hyperlipidemia E78.5    Coronary artery disease I25.10    Sleep apnea G47.30    History of DVT (deep vein thrombosis) Z86.718    Family history of DVT Z82.49    AAA (abdominal aortic aneurysm) without rupture ([facility]) I71.4    Pleural effusion, left J90    Dyspnea and respiratory abnormality R06.00, R06.89    Pleural effusion J90    PE (pulmonary thromboembolism) ([facility]) I26.99    COPD exacerbation ([facility]) J44.1    Pelvic pain in female R10.2    Vitamin D deficiency E55.9    Other emphysema ([facility]) J43.8    Acute bronchitis J20.9    Acute respiratory failure with hypoxia ([facility]) J96.01    Abnormal chest x-ray R93.89    Atypical pneumonia J18.9    Atrial fibrillation with RVR ([facility]) I48.91    Acute on chronic diastolic heart failure ([facility]) I50.33    PVD (peripheral vascular disease) ([facility]) I73.9    Abnormal nuclear stress test R94.39    AAA (abdominal aortic aneurysm) ([facility]) I71.4    PAF (paroxysmal atrial fibrillation) ([facility]) I48.0    Endoleak post (EVAR) endovascular aneurysm repair, 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    Carotid artery stenosis without cerebral infarction, bilateral I65.23    History of repair of aneurysm of abdominal aorta using endovascular stent graft Z95.828    Atherosclerotic heart disease of native coronary artery with other forms of angina pectoris ([facility]) I25.118    Morbidly obese ([facility]) E66.01    COPD, severe ([facility]) J44.9    Hypertensive crisis I16.9    Acute on chronic congestive heart failure ([facility]) I50.9    Respiratory failure ([facility]) J96.90    Left atrial flutter by electrocardiogram ([facility]) I48.92    Persistent atrial fibrillation ([facility]) I48.19    A-fib ([facility]) I48.91       Isolation/Infection:   Isolation          No Isolation        Unreconciled Outside Infections     Enable clinical decision support by reconciling outside information with the patient's chart. .    Infection Onset Last Indicated Last Received Source    No mapped external infections found    .     Unmapped Infections      MRSA 10/30/18 10/30/18 08/04/20 [facility]             Patient Infection Status Infection Onset Added Last Indicated Last Indicated By Review Planned Expiration Resolved Resolved By    None active    Resolved    COVID-19 Rule Out 09/24/20 09/24/20 09/24/20 COVID-19 (Ordered)   09/25/20 Lavelle Godinez RN          Nurse Assessment:  Last Vital Signs: /62   Pulse 72   Temp 98.7 °F (37.1 °C) (Oral)   Resp 22   Ht 5' 4\" (1.626 m)   Wt 226 lb 13.7 oz (102.9 kg)   SpO2 97%   BMI 38.94 kg/m²     Last documented pain score (0-10 scale): Pain Level: 6  Last Weight:   Wt Readings from Last 1 Encounters:   09/09/21 226 lb 13.7 oz (102.9 kg)     Mental Status:  oriented, alert, coherent and logical    IV Access:  - None    Nursing Mobility/ADLs:  Walking   Independent  Transfer  Independent  Bathing  Independent  Dressing  Independent  Toileting  Independent  Feeding  Independent  Med Admin  Independent  Med Delivery   whole    Wound Care Documentation and Therapy:  Puncture 10/07/20 Groin Anterior;Right (Active)   Number of days: 337        Elimination:  Continence:   · Bowel: Yes  · Bladder: Yes  Urinary Catheter: None   Colostomy/Ileostomy/Ileal Conduit: No       Date of Last BM: 9/10/2021    Intake/Output Summary (Last 24 hours) at 9/9/2021 1617  Last data filed at 9/9/2021 1516  Gross per 24 hour   Intake --   Output 700 ml   Net -700 ml     No intake/output data recorded. Safety Concerns: At Risk for Falls    Impairments/Disabilities:      None    Nutrition Therapy:  Current Nutrition Therapy:   - Oral Diet:  Low Sodium (2gm)    Routes of Feeding: Oral  Liquids: No Restrictions  Daily Fluid Restriction: yes - amount 1500  Last Modified Barium Swallow with Video (Video Swallowing Test): not done    Treatments at the Time of Hospital Discharge:   Respiratory Treatments: nebulizer and inhaler  Oxygen Therapy:  is not on home oxygen therapy.   Ventilator:    - No ventilator support    Rehab Therapies: none  Weight Bearing Status/Restrictions: No weight bearing restirctions  Other catarino REES SIGNATURE:  Electronically signed by Gavino Fuentes RN on 9/12/21 at 11:52 AM EDT    CASE MANAGEMENT/SOCIAL WORK SECTION    Inpatient Status Date: ***    Readmission Risk Assessment Score:  Readmission Risk              Risk of Unplanned Readmission:  17           Discharging to Facility/ Agency   · Name:   · Address:  · Phone:  · Fax:    Dialysis Facility (if applicable)   · Name:  · Address:  · Dialysis Schedule:  · Phone:  · Fax:    / signature: {Esignature:[id number]}    PHYSICIAN SECTION    Prognosis: {Prognosis:6837739476}    Condition at Discharge: [address] [facility] Patient Condition:[id number]}    Rehab Potential (if transferring to Rehab): {Prognosis:1084812742}    Recommended Labs or Other Treatments After Discharge: ***    Physician Certification: I certify the above information and transfer of Courtney Baxter  is necessary for the continuing treatment of the diagnosis listed and that she requires {Admit to Appropriate Level of Care:93551} for {GREATER/LESS:017749619} 30 days.      Update Admission H&P: {CHP DME Changes in USNDD:203085119}    PHYSICIAN SIGNATURE:  {Esignature:[id number]}

## 2021-09-09 NOTE — PROGRESS NOTES
Medication Reconciliation    List of medications for Ulysses Berg is currently taking is complete. Source of Information:   Epic records  Conversation with patient at bedside     Allergies  Allergy list not thoroughly reviewed with patient at this time  Allergies listed in Epic as follows: Morphine and Other     Current Medications:  No current facility-administered medications for this encounter.     Current Outpatient Medications:     clobetasol (TEMOVATE) 0.05 % cream, Apply topically 2 times daily as needed for Rash Apply to rash between genital area and buttocks and around anus bid prn x up to 2 weeks -need at least 1 week break prior to restarting, Disp: , Rfl:     mupirocin (BACTROBAN) 2 % ointment, Apply topically 3 times daily Apply to scabs three times daily for 10 days or until healed, Disp: , Rfl:     triamcinolone (KENALOG) 0.1 % ointment, Apply topically 2 times daily as needed for Rash Apply to rash between genital area and buttocks and around anus bid prn during breaks from clobetasol, Disp: , Rfl:     traZODone (DESYREL) 50 MG tablet, Take 1 tablet by mouth nightly as needed for Sleep (Patient taking differently: Take 50 mg by mouth nightly ), Disp: 30 tablet, Rfl: 1    isosorbide mononitrate (IMDUR) 30 MG extended release tablet, TAKE ONE TABLET BY MOUTH DAILY (Patient taking differently: Take 30 mg by mouth daily ), Disp: 90 tablet, Rfl: 4    albuterol (PROVENTIL) (2.5 MG/3ML) 0.083% nebulizer solution, Take 3 mLs by nebulization every 4 hours as needed for Wheezing, Disp: 540 mL, Rfl: 2    metoprolol succinate (TOPROL XL) 50 MG extended release tablet, Take 50 mg by mouth daily, Disp: , Rfl:     torsemide (DEMADEX) 20 MG tablet, Take 20 mg by mouth daily , Disp: , Rfl:     rivaroxaban (XARELTO) 20 MG TABS tablet, TAKE ONE TABLET BY MOUTH DAILY WITH BREAKFAST (Patient taking differently: Take 20 mg by mouth daily (with breakfast) TAKE ONE TABLET BY MOUTH DAILY WITH BREAKFAST), Disp: 30 tablet, Rfl: 3    NIFEdipine (ADALAT CC) 60 MG extended release tablet, TAKE ONE TABLET BY MOUTH DAILY (Patient taking differently: Take 60 mg by mouth daily ), Disp: 90 tablet, Rfl: 3    rosuvastatin (CRESTOR) 20 MG tablet, Take 1 tablet by mouth daily, Disp: 30 tablet, Rfl: 11    albuterol sulfate HFA (PROAIR HFA) 108 (90 Base) MCG/ACT inhaler, Inhale 2 puffs into the lungs every 4 hours as needed for Wheezing or Shortness of Breath, Disp: 1 Inhaler, Rfl: 11    nitroGLYCERIN (NITROSTAT) 0.4 MG SL tablet, Place 1 tablet under the tongue every 5 minutes as needed for Chest pain, Disp: 25 tablet, Rfl: 1    Multiple Vitamins-Minerals (MULTI FOR HER 50+ PO), Take 1 tablet by mouth daily, Disp: , Rfl:     Notes Regarding Home Medications:   Patient received all of their home medications prior to arrival to the emergency department  Has only been taking 20 mg of torsemide daily, last filled February 2021  Removed Symbicort and hydralazine as she has not been taking either  Added mupirocin, clobetasol, and triamcinolone--she is currently using all three      MARCIO Hummel HUAN [facility], PharmD   9/9/2021 1:38 PM

## 2021-09-09 NOTE — ED PROVIDER NOTES
[facility] Emergency Department    CHIEF COMPLAINT  Shortness of Breath (for a couple of days, wears o2 at home, no known exposure to anyone with covid. )      SHARED SERVICE VISIT  I have seen and evaluated this patient with my supervising physician, Dr. Harika Moya. HISTORY OF PRESENT ILLNESS  Brodie Cooley is a 77 y.o. nontoxic, well-appearing, but mildly distressed female with medical history including, not limited to, AAA, atrial fibrillation, coronary artery disease, CHF, COPD, history of blood clots, hypertension, hyperlipidemia who presents to the ED complaining of increased shortness of breath over the past 2 days. She is on 3 L O2 per nasal cannula as needed at home at baseline. She denies chest pain, nausea, vomiting, dizziness, presyncope, fever, chills, sweats, change in smell or taste, hemoptysis, leg/calf pain or swelling, abdominal pain, or urinary symptoms. She does endorse an intermittent dry cough that accompanies her shortness of breath. She is COVID-19 vaccinated and denies any known exposures to anyone with COVID-19. No other complaints, modifying factors or associated symptoms. Nursing notes reviewed. Past Medical History:   Diagnosis Date    AAA (abdominal aortic aneurysm) ([facility] [address]     pt states it is 4cm    AAA (abdominal aortic aneurysm) without rupture (HCC) 2/10/2015    Atrial fibrillation (HCC)     CAD (coronary artery disease)     CHF (congestive heart failure) ([facility] [address]     COPD (chronic obstructive pulmonary disease) (HCC)     History of blood clots     Hyperlipidemia     Hypertension      Past Surgical History:   Procedure Laterality Date    ABDOMINAL AORTIC ANEURYSM REPAIR      Endovascular abdominal AA    APPENDECTOMY  1990    incidental    BLADDER SUSPENSION      CATARACT REMOVAL      CHOLECYSTECTOMY  10/15/13    COLONOSCOPY  9/2/07    dr Madisyn Antonio and check in 5 years.      COLONOSCOPY  06/16/2017    ok dr arndt, repeat 5 years   [employer] HYSTERECTOMY      for benign tumor. just the uterus    JOINT REPLACEMENT  2013    right knee replacement    TONSILLECTOMY  as a child    TUMOR EXCISION      benign behind right ear about      Family History   Problem Relation Age of Onset    Heart Disease Father     Hypertension Other      Social History     Socioeconomic History    Marital status:      Spouse name: Not on file    Number of children: Not on file    Years of education: Not on file    Highest education level: Not on file   Occupational History    Occupation: housewife   Tobacco Use    Smoking status: Former Smoker     Packs/day: 1.00     Years: 37.00     Pack years: 37.00     Types: Cigarettes     Start date: 1976     Quit date: 2013     Years since quittin.9    Smokeless tobacco: Never Used    Tobacco comment: E cigarette now and then   Vaping Use    Vaping Use: Former   Substance and Sexual Activity    Alcohol use: No    Drug use: No    Sexual activity: Yes     Partners: Male   Other Topics Concern    Not on file   Social History Narrative    Not on file     Social Determinants of Health     Financial Resource Strain: Low Risk     Difficulty of Paying Living Expenses: Not hard at all   Food Insecurity: No Food Insecurity    Worried About [address] in the Last Year: Never true    [facility] in the Last Year: Never true   Transportation Needs:     Lack of Transportation (Medical):      Lack of Transportation (Non-Medical):    Physical Activity:     Days of Exercise per Week:     Minutes of Exercise per Session:    Stress:     Feeling of Stress :    Social Connections:     Frequency of Communication with Friends and Family:     Frequency of Social Gatherings with Friends and Family:     Attends Orthodoxy Services:     Active Member of Clubs or Organizations:     Attends Club or Organization Meetings:     Marital Status:    Intimate Partner Violence:     Fear of Current or Ex-Partner:     Emotionally Abused:     Physically Abused:     Sexually Abused:      Current Facility-Administered Medications   Medication Dose Route Frequency Provider Last Rate Last Admin    ipratropium-albuterol (DUONEB) nebulizer solution 2 ampule  2 ampule Inhalation Once Cotyne BUDDY Alcantar Madyson, APRN - CNP        predniSONE (DELTASONE) tablet 40 mg  40 mg Oral Once Peterson Fruit, APRN - CNP         Current Outpatient Medications   Medication Sig Dispense Refill    traZODone (DESYREL) 50 MG tablet Take 1 tablet by mouth nightly as needed for Sleep 30 tablet 1    isosorbide mononitrate (IMDUR) 30 MG extended release tablet TAKE ONE TABLET BY MOUTH DAILY 90 tablet 4    albuterol (PROVENTIL) (2.5 MG/3ML) 0.083% nebulizer solution Take 3 mLs by nebulization every 4 hours as needed for Wheezing 540 mL 2    metoprolol succinate (TOPROL XL) 50 MG extended release tablet Take 50 mg by mouth daily      torsemide (DEMADEX) 20 MG tablet Take 40 mg by mouth daily      budesonide-formoterol (SYMBICORT) 160-4.5 MCG/ACT AERO Inhale 2 puffs into the lungs 2 times daily      rivaroxaban (XARELTO) 20 MG TABS tablet TAKE ONE TABLET BY MOUTH DAILY WITH BREAKFAST (Patient taking differently: Take 20 mg by mouth daily (with breakfast) TAKE ONE TABLET BY MOUTH DAILY WITH BREAKFAST) 30 tablet 3    NIFEdipine (ADALAT CC) 60 MG extended release tablet TAKE ONE TABLET BY MOUTH DAILY 90 tablet 3    rosuvastatin (CRESTOR) 20 MG tablet Take 1 tablet by mouth daily 30 tablet 11    albuterol sulfate HFA (PROAIR HFA) 108 (90 Base) MCG/ACT inhaler Inhale 2 puffs into the lungs every 4 hours as needed for Wheezing or Shortness of Breath 1 Inhaler 11    hydrALAZINE (APRESOLINE) 50 MG tablet Take 1 tablet by mouth every 8 hours 90 tablet 3    nitroGLYCERIN (NITROSTAT) 0.4 MG SL tablet Place 1 tablet under the tongue every 5 minutes as needed for Chest pain 25 tablet 1    Multiple Vitamins-Minerals (MULTI FOR HER 50+ PO) Take 1 tablet by mouth daily       Allergies   Allergen Reactions    Morphine Rash     rash    Other Rash       REVIEW OF SYSTEMS  10 systems reviewed, pertinent positives per HPI otherwise noted to be negative    PHYSICAL EXAM  BP (!) 182/72   Pulse 64   Temp 97.8 °F (36.6 °C) (Oral)   Resp 20   Wt 226 lb 13.7 oz (102.9 kg)   SpO2 99%   BMI 38.94 kg/m²   GENERAL APPEARANCE: Awake and alert. Cooperative. Distress. Non-- toxic in appearance. HEAD: Normocephalic. Atraumatic. EYES: PERRL. EOM's grossly intact. ENT: Mucous membranes are moist.   NECK: Supple. HEART: RRR. No murmurs, rubs, or gallops. LUNGS: Respirations labored.  + Diffuse wheezes. Moderate air exchange. Speaking comfortably in full sentences. ABDOMEN: Soft. Non-distended. Non-tender. No guarding or rebound.  + Bowel sounds x 4 quadrants. No masses. No organomegaly. EXTREMITIES: Trace edema peripheral edema. Moves all extremities equally. All extremities neurovascularly intact. SKIN: Warm and dry. No acute rashes. NEUROLOGICAL: Alert and oriented. Extremities/skin  2-12 intact. No gross facial drooping. Strength 5/5, sensation intact. PSYCHIATRIC: Normal mood and affect. RADIOLOGY  No results found. ED COURSE  Patient did not require analgesia while in the emergency department. Triage vitals stable but with systolic hypertension at 984/ this next mmHg. A cardiac workup was initiated including urinalysis reflex to culture: Blood trace, protein 100, otherwise unremarkable    Microscopic urinalysis: Negative for blood, WBCs, epithelial cells, unremarkablle. BNP: 3559    Troponin: 0.02    CMP: Negative for electrolyte derangement, renal dysfunction, or elevations in LFTs with a glucose of 113 mg/dL, otherwise unremarkable    CBC: [email or web address], hemoglobin decreased at 7.4, hematocrit decreased at time 35.2, RDW 17.3%, neutrophils absolute 9.5, and lymphocytes absolute 0.7, otherwise unremarkable.     EKG: NSR with a ventricular rate of 64 bpm    CXR: Small right pleural effusion with adjacent atelectasis. Stable cardiomegaly. CT chest: Small bilateral pleural effusion with mild dependent pulmonary and consolidative opacities within both lower lobes, most likely #6, most likely aspiration or pneumonia. Mild mediastinal lymphadenopathy most likely benign and reactive in etiology. Number next lymphadenopathy is considered unlikely hematoma excluded. Enlarged heterogeneous opacity of the thyroid gland. Recommend further characterization with a routine nonemergent thyroid ultrasound, as advised below.   Findings suggestive of chronic pulmonary arterial hypertension          Labs Ordered:  I have reviewed and interpreted all of the currently available lab results from this visit:  Results for orders placed or performed during the hospital encounter of 09/09/21   CBC Auto Differential   Result Value Ref Range    WBC 11.2 (H) 4.0 - 11.0 K/uL    RBC 4.05 4.00 - 5.20 M/uL    Hemoglobin 11.4 (L) 12.0 - 16.0 g/dL    Hematocrit 35.2 (L) 36.0 - 48.0 %    MCV 87.0 80.0 - 100.0 fL    MCH 28.1 26.0 - 34.0 pg    MCHC 32.3 31.0 - 36.0 g/dL    RDW 17.3 (H) 12.4 - 15.4 %    Platelets 538 216 - 971 K/uL    MPV 9.0 5.0 - 10.5 fL    Neutrophils % 85.0 %    Lymphocytes % 6.1 %    Monocytes % 6.7 %    Eosinophils % 2.0 %    Basophils % 0.2 %    Neutrophils Absolute 9.5 (H) 1.7 - 7.7 K/uL    Lymphocytes Absolute 0.7 (L) 1.0 - 5.1 K/uL    Monocytes Absolute 0.7 0.0 - 1.3 K/uL    Eosinophils Absolute 0.2 0.0 - 0.6 K/uL    Basophils Absolute 0.0 0.0 - 0.2 K/uL   Brain Natriuretic Peptide   Result Value Ref Range    Pro-BNP 3,559 (H) 0 - 449 pg/mL   Troponin   Result Value Ref Range    Troponin 0.02 (H) <0.01 ng/mL   Comprehensive Metabolic Panel w/ Reflex to MG   Result Value Ref Range    Sodium 139 136 - 145 mmol/L    Potassium reflex Magnesium 4.2 3.5 - 5.1 mmol/L    Chloride 100 99 - 110 mmol/L    CO2 30 21 - 32 mmol/L    Anion Gap 9 3 - 16 Glucose 113 (H) 70 - 99 mg/dL    BUN 13 7 - 20 mg/dL    CREATININE 0.9 0.6 - 1.2 mg/dL    GFR Non-African American >60 >60    GFR African American >60 >60    Calcium 9.8 8.3 - 10.6 mg/dL    Total Protein 6.7 6.4 - 8.2 g/dL    Albumin 4.1 3.4 - 5.0 g/dL    Albumin/Globulin Ratio 1.6 1.1 - 2.2    Total Bilirubin 0.5 0.0 - 1.0 mg/dL    Alkaline Phosphatase 92 40 - 129 U/L    ALT 12 10 - 40 U/L    AST 17 15 - 37 U/L    Globulin 2.6 g/dL   Urine, reflex to culture    Specimen: Urine, clean catch   Result Value Ref Range    Color, UA YELLOW Straw/Yellow    Clarity, UA Clear Clear    Glucose, Ur Negative Negative mg/dL    Bilirubin Urine Negative Negative    Ketones, Urine Negative Negative mg/dL    Specific Gravity, UA 1.010 1.005 - 1.030    Blood, Urine TRACE (A) Negative    pH, UA 6.5 5.0 - 8.0    Protein,  (A) Negative mg/dL    Urobilinogen, Urine 0.2 <2.0 E.U./dL    Nitrite, Urine Negative Negative    Leukocyte Esterase, Urine Negative Negative    Microscopic Examination YES     Urine Type NotGiven     Urine Reflex to Culture Not Indicated    Microscopic Urinalysis   Result Value Ref Range    Hyaline Casts, UA 0 0 - 8 /LPF    WBC, UA 1 0 - 5 /HPF    RBC, UA 1 0 - 4 /HPF    Epithelial Cells, UA 1 0 - 5 /HPF   Troponin   Result Value Ref Range    Troponin 0.02 (H) <0.01 ng/mL   Troponin   Result Value Ref Range    Troponin <0.01 <0.01 ng/mL   EKG 12 Lead   Result Value Ref Range    Ventricular Rate 64 BPM    Atrial Rate 64 BPM    P-R Interval 202 ms    QRS Duration 74 ms    Q-T Interval 412 ms    QTc Calculation (Bazett) 425 ms    P Axis 66 degrees    R Axis -14 degrees    T Axis 41 degrees    Diagnosis       Normal sinus rhythmVoltage criteria for left ventricular hypertrophySeptal infarct (cited on or before 07-JAN-2018)Abnormal ECGWhen compared with ECG of 18-FEB-2021 03:03,Voltage criteria for left ventricular hypertrophy is now PresentConfirmed by Angelica Schilder, [address] [place] [address] [id number]) on 9/9/2021 5:55:50 PM Imaging ordered:  XR CHEST (2 VW)    Result Date: 9/9/2021  EXAMINATION: TWO XRAY VIEWS OF THE CHEST 9/9/2021 11:11 am COMPARISON: Chest x-ray dated 01/04/2021. HISTORY: ORDERING SYSTEM PROVIDED HISTORY: Shortness of breath TECHNOLOGIST PROVIDED HISTORY: Reason for exam:->Shortness of breath Reason for Exam: Shortness of breath Acuity: Acute Type of Exam: Initial FINDINGS: HEART/MEDIASTINUM: The cardiac silhouette is enlarged, but stable. PLEURA/LUNGS: There is a small right pleural effusion with adjacent compressive atelectasis. There are no focal consolidations. There is no appreciable pneumothorax. BONES/SOFT TISSUE: No acute abnormality. Small right pleural effusion with adjacent atelectasis. Stable cardiomegaly. CT CHEST WO CONTRAST    Result Date: 9/9/2021  EXAMINATION: CT OF THE CHEST WITHOUT CONTRAST 9/9/2021 12:51 pm TECHNIQUE: CT of the chest was performed without the administration of intravenous contrast. Multiplanar reformatted images are provided for review. Dose modulation, iterative reconstruction, and/or weight based adjustment of the mA/kV was utilized to reduce the radiation dose to as low as reasonably achievable. COMPARISON: 02/26/2020, 08/01/2015, 06/28/2015, 06/02/2015 HISTORY: ORDERING SYSTEM PROVIDED HISTORY: sob TECHNOLOGIST PROVIDED HISTORY: Reason for exam:->sob Decision Support Exception - unselect if not a suspected or confirmed emergency medical condition->Emergency Medical Condition (MA) Reason for Exam: sob Acuity: Acute Type of Exam: Initial FINDINGS: Mediastinum: The thyroid is enlarged and heterogeneous in attenuation. There is mild mediastinal lymphadenopathy, with the largest lymph node measuring approximately 14 mm in short axis within the subcarinal space. This is new from all prior exams. There is atherosclerotic disease and tortuosity of the intrathoracic aorta. There is coronary atherosclerosis.   The main and central pulmonary arteries are mildly enlarged, suggestive of chronic pulmonary arterial hypertension. There is mild four-chamber cardiac enlargement. No pericardial abnormality is identified. Lungs/pleura: The central airways are patent. There are small bilateral pleural effusions with mild curvilinear and consolidative opacity within the dependent portions of both lower lobes, most likely passive atelectasis, less likely aspiration or pneumonia. There is minimal pleural and parenchymal scarring within the right middle lobe. Mild scattered ground-glass opacities throughout both lungs likely reflect atelectasis. No additional focus of airspace consolidation is identified. There is no evidence of a pneumothorax. There is a background of minimal to mild emphysema. A few scattered calcified granulomata are noted. There is a stable 5 mm subpleural granuloma within the posterior right upper lobe, unchanged dating back to 06/02/2015, likely benign given its long-term stability. There is an additional stable 4 mm granuloma within the right middle lobe, also unchanged from 06/02/2015. No new suspicious pulmonary nodule or mass is evident. Upper Abdomen: Adrenal glands are unremarkable bilaterally. The patient is status post cholecystectomy. An aortic stent graft is partially visualized. The remainder of the upper abdomen is unremarkable. Soft Tissues/Bones: There is multilevel degenerative change throughout the visualized thoracolumbar spine. No osteolytic or osteoblastic lesion is seen. 1. Small bilateral pleural effusions with mild dependent curvilinear and consolidative opacities within both lower lobes, most likely passive atelectasis, less likely aspiration or pneumonia. 2. Mild mediastinal lymphadenopathy, most likely benign and reactive in etiology. Malignant lymphadenopathy is considered unlikely, though not excluded. 3. Enlarged heterogeneous appearance of the thyroid gland.   Recommend further characterization with a routine non urgent thyroid ultrasound, as advised below. 4. Findings suggestive of chronic pulmonary arterial hypertension. RECOMMENDATIONS: Incidental heterogeneous and enlarged thyroid. Recommend thyroid US. Reference: J Am Kami Radiol. 2015 Feb;12(2): 143-50     US THYROID    Result Date: 9/9/2021  EXAMINATION: THYROID ULTRASOUND 9/9/2021 COMPARISON: None. HISTORY: ORDERING SYSTEM PROVIDED HISTORY: heterogenous gland TECHNOLOGIST PROVIDED HISTORY: Reason for exam:->heterogenous gland FINDINGS: Right thyroid lobe:  55 x 24 x 30 mm Left thyroid lobe:  50 x 24 x 18 mm Isthmus:  9 mm Thyroid Gland: The thyroid is diffusely heterogeneous. Nodules: There is a 19 mm simple cyst in the right upper pole. There are 10 mm spongiform nodules in the left lobe which are benign. No suspicious nodule is identified. No additional imaging follow-up is recommended for the spongiform nodules. Cervical lymphadenopathy: No abnormal lymph nodes in the imaged portions of the neck. Heterogeneous thyroid. Benign thyroid nodules for which additional follow-up is not recommended. Reevaluation:  On reentry into patient's room I find the 56-year-old female resting comfortably on stretcher with eyes open with stable vital signs. She remains awake, alert, oriented x4. No complaints of at this time. A discussion was had with Mrs. Perez regarding elevated troponin, acute on chronic congestive heart failure, pleural effusion on the right, and intention to admit. Risk management discussed and shared decision making had with patient and/or surrogate. All questions were answered. Patient will be admitted to the hospital for further evaluation and treatment including diuresis. CRITICAL CARE TIME  0 Minutes of critical care time spent not including separately billable procedures. MDM  Patient presents to the emergency department with shortness of breath.   Alternate diagnoses are less likely based on history and physical. Considered acute coronary syndrome, pulmonary embolism, pneumonia, sepsis, pericardial tamponade, pneumothorax,  thoracic aortic dissection, anxiety, among others less likely based on history and physical.          Work-up reveals:  (Abnormal labs and imaging noted otherwise  normal)      Urine reflex to culture: Blood trace, protein 100, otherwise unremarkable    Microscopic urinalysis: Unremarkable    BMP: Elevated at 3559    Troponin: 0.02    CMP: Negative for electrolyte derangement, renal dysfunction, or elevations in LFTs; hyperglycemic at 133 mg/dL      Interventions: Patient was placed on cardiac monitoring upon arrival in the emergency department. She was given DuoNeb's x2, prednisone 40 mg and eventually Lasix 40 mg IV status post she was found to be in an acute on chronic congestive heart failure. Therefore:    My attending physician nor I feel that the patient is safe or appropriate for discharged home as she is experiencing an acute exacerbation of her chronic CHF. She will require admission for diuresis. Patient verbalized understanding is agreeable to plan for admission. Clinical Impression:    Acute on chronic congestive heart failure  Elevated troponin  Pleural effusion on the right    Disposition:  Admitted      Patient will be admitted to hospital for further evaluation and treatment. Hospitalist: Dr. Compa Goss    Discussed patients HPI, ED work-up, results, treatment, and response with my attending physician Dr. Naeem Farias and the Hospitalist -Dr. Compa Goss who agrees to admit the patient to the hospital.          [facility]    This chart was created using Dragon dictation.  Every effort was made by myself to ensure accuracy, however due to limitations of this technology errors may be present.=CHRISTINE Danile - CNP  09/10/21 [facility] CHRISTINE - CNP  09/10/21 0139

## 2021-09-10 LAB
ANION GAP SERPL CALCULATED.3IONS-SCNC: 8 MMOL/L (ref 3–16)
BUN BLDV-MCNC: 14 MG/DL (ref 7–20)
CALCIUM SERPL-MCNC: 9.4 MG/DL (ref 8.3–10.6)
CHLORIDE BLD-SCNC: 98 MMOL/L (ref 99–110)
CHOLESTEROL, TOTAL: 107 MG/DL (ref 0–199)
CO2: 35 MMOL/L (ref 21–32)
CREAT SERPL-MCNC: 1.1 MG/DL (ref 0.6–1.2)
GFR AFRICAN AMERICAN: 58
GFR NON-AFRICAN AMERICAN: 48
GLUCOSE BLD-MCNC: 104 MG/DL (ref 70–99)
HDLC SERPL-MCNC: 56 MG/DL (ref 40–60)
LDL CHOLESTEROL CALCULATED: 39 MG/DL
LV EF: 60 %
LVEF MODALITY: NORMAL
MAGNESIUM: 1.8 MG/DL (ref 1.8–2.4)
POTASSIUM SERPL-SCNC: 3.4 MMOL/L (ref 3.5–5.1)
SODIUM BLD-SCNC: 141 MMOL/L (ref 136–145)
TRIGL SERPL-MCNC: 59 MG/DL (ref 0–150)
VLDLC SERPL CALC-MCNC: 12 MG/DL

## 2021-09-10 PROCEDURE — 6360000002 HC RX W HCPCS: Performed by: INTERNAL MEDICINE

## 2021-09-10 PROCEDURE — 80061 LIPID PANEL: CPT

## 2021-09-10 PROCEDURE — 94640 AIRWAY INHALATION TREATMENT: CPT

## 2021-09-10 PROCEDURE — 1200000000 HC SEMI PRIVATE

## 2021-09-10 PROCEDURE — 2700000000 HC OXYGEN THERAPY PER DAY

## 2021-09-10 PROCEDURE — 36415 COLL VENOUS BLD VENIPUNCTURE: CPT

## 2021-09-10 PROCEDURE — 2580000003 HC RX 258: Performed by: INTERNAL MEDICINE

## 2021-09-10 PROCEDURE — 99233 SBSQ HOSP IP/OBS HIGH 50: CPT | Performed by: INTERNAL MEDICINE

## 2021-09-10 PROCEDURE — 80048 BASIC METABOLIC PNL TOTAL CA: CPT

## 2021-09-10 PROCEDURE — 6370000000 HC RX 637 (ALT 250 FOR IP): Performed by: INTERNAL MEDICINE

## 2021-09-10 PROCEDURE — 93306 TTE W/DOPPLER COMPLETE: CPT

## 2021-09-10 PROCEDURE — 94761 N-INVAS EAR/PLS OXIMETRY MLT: CPT

## 2021-09-10 PROCEDURE — 83735 ASSAY OF MAGNESIUM: CPT

## 2021-09-10 RX ORDER — LISINOPRIL 10 MG/1
10 TABLET ORAL DAILY
Status: DISCONTINUED | OUTPATIENT
Start: 2021-09-11 | End: 2021-09-10

## 2021-09-10 RX ORDER — POTASSIUM CHLORIDE 750 MG/1
40 TABLET, FILM COATED, EXTENDED RELEASE ORAL ONCE
Status: COMPLETED | OUTPATIENT
Start: 2021-09-10 | End: 2021-09-10

## 2021-09-10 RX ORDER — LISINOPRIL 10 MG/1
10 TABLET ORAL DAILY
Status: DISCONTINUED | OUTPATIENT
Start: 2021-09-11 | End: 2021-09-12 | Stop reason: HOSPADM

## 2021-09-10 RX ORDER — LISINOPRIL 5 MG/1
5 TABLET ORAL ONCE
Status: COMPLETED | OUTPATIENT
Start: 2021-09-10 | End: 2021-09-10

## 2021-09-10 RX ORDER — FUROSEMIDE 10 MG/ML
60 INJECTION INTRAMUSCULAR; INTRAVENOUS 2 TIMES DAILY
Status: DISCONTINUED | OUTPATIENT
Start: 2021-09-10 | End: 2021-09-11

## 2021-09-10 RX ADMIN — ALBUTEROL SULFATE 2.5 MG: 2.5 SOLUTION RESPIRATORY (INHALATION) at 16:34

## 2021-09-10 RX ADMIN — ALBUTEROL SULFATE 2 PUFF: 90 AEROSOL, METERED RESPIRATORY (INHALATION) at 05:10

## 2021-09-10 RX ADMIN — LISINOPRIL 5 MG: 5 TABLET ORAL at 08:14

## 2021-09-10 RX ADMIN — ACETAMINOPHEN 650 MG: 325 TABLET ORAL at 05:01

## 2021-09-10 RX ADMIN — METOPROLOL SUCCINATE 50 MG: 50 TABLET, EXTENDED RELEASE ORAL at 08:14

## 2021-09-10 RX ADMIN — SODIUM CHLORIDE, PRESERVATIVE FREE 10 ML: 5 INJECTION INTRAVENOUS at 08:18

## 2021-09-10 RX ADMIN — ROSUVASTATIN CALCIUM 20 MG: 20 TABLET, FILM COATED ORAL at 08:14

## 2021-09-10 RX ADMIN — LISINOPRIL 5 MG: 5 TABLET ORAL at 10:26

## 2021-09-10 RX ADMIN — FUROSEMIDE 60 MG: 10 INJECTION, SOLUTION INTRAMUSCULAR; INTRAVENOUS at 17:09

## 2021-09-10 RX ADMIN — ALBUTEROL SULFATE 2.5 MG: 2.5 SOLUTION RESPIRATORY (INHALATION) at 20:32

## 2021-09-10 RX ADMIN — ALBUTEROL SULFATE 2.5 MG: 2.5 SOLUTION RESPIRATORY (INHALATION) at 12:31

## 2021-09-10 RX ADMIN — ISOSORBIDE MONONITRATE 30 MG: 30 TABLET, EXTENDED RELEASE ORAL at 08:14

## 2021-09-10 RX ADMIN — HYDRALAZINE HYDROCHLORIDE 50 MG: 50 TABLET, FILM COATED ORAL at 07:04

## 2021-09-10 RX ADMIN — POTASSIUM CHLORIDE 40 MEQ: 750 TABLET, FILM COATED, EXTENDED RELEASE ORAL at 10:26

## 2021-09-10 RX ADMIN — RIVAROXABAN 20 MG: 20 TABLET, FILM COATED ORAL at 08:14

## 2021-09-10 RX ADMIN — BUDESONIDE AND FORMOTEROL FUMARATE DIHYDRATE 2 PUFF: 160; 4.5 AEROSOL RESPIRATORY (INHALATION) at 20:32

## 2021-09-10 RX ADMIN — FUROSEMIDE 40 MG: 10 INJECTION, SOLUTION INTRAMUSCULAR; INTRAVENOUS at 08:15

## 2021-09-10 ASSESSMENT — PAIN SCALES - GENERAL
PAINLEVEL_OUTOF10: 3
PAINLEVEL_OUTOF10: 0

## 2021-09-10 NOTE — PROGRESS NOTES
Hospitalist Progress Note      PCP: Pia Macedo MD    Date of Admission: 9/9/2021        Subjective: Less shortness of breath, feels better, no muscle cramps no fever or chills      Medications:  Reviewed    Infusion Medications    sodium chloride       Scheduled Medications    budesonide-formoterol  2 puff Inhalation BID    hydrALAZINE  50 mg Oral 3 times per day    isosorbide mononitrate  30 mg Oral Daily    metoprolol succinate  50 mg Oral Daily    rivaroxaban  20 mg Oral Daily with breakfast    rosuvastatin  20 mg Oral Daily    sodium chloride flush  5-40 mL IntraVENous 2 times per day    lisinopril  5 mg Oral Daily    furosemide  40 mg IntraVENous BID    albuterol  2.5 mg Nebulization 4x daily    rOPINIRole  0.5 mg Oral Nightly     PRN Meds: albuterol sulfate HFA, nitroGLYCERIN, traZODone, sodium chloride flush, sodium chloride, ondansetron **OR** ondansetron, polyethylene glycol, acetaminophen **OR** acetaminophen      Intake/Output Summary (Last 24 hours) at 9/10/2021 0906  Last data filed at 9/9/2021 2355  Gross per 24 hour   Intake 240 ml   Output 2750 ml   Net -2510 ml       Physical Exam Performed:    BP (!) 179/71   Pulse 56   Temp 97.8 °F (36.6 °C) (Oral)   Resp 18   Ht 5' 4\" (1.626 m)   Wt 225 lb 5 oz (102.2 kg)   SpO2 97%   BMI 38.67 kg/m²     General appearance: No apparent distress  Neck: Supple  Respiratory:  Normal respiratory effort. Clear to auscultation, bilaterally without Rales/Wheezes/Rhonchi. Cardiovascular: Regular rate and rhythm with normal S1/S2 without murmurs, rubs or gallops. Abdomen: Soft, non-tender, non-distended. Musculoskeletal: No clubbing, cyanosis   Skin: Skin color, texture, turgor normal.  No rashes or lesions.   Neurologic:  No weakness   Psychiatric: Alert and oriented  Capillary Refill: Brisk,3 seconds, normal   Peripheral Pulses: +2 palpable, equal bilaterally       Labs:   Recent Labs     09/09/21  1113   WBC 11.2*   HGB 11.4*   HCT 35.2*      Recent Labs     09/09/21  1113 09/10/21  0722    141   K 4.2 3.4*    98*   CO2 30 35*   BUN 13 14   CREATININE 0.9 1.1   CALCIUM 9.8 9.4     Recent Labs     09/09/21  1113   AST 17   ALT 12   BILITOT 0.5   ALKPHOS 92     No results for input(s): INR in the last 72 hours. Recent Labs     09/09/21  1113 09/09/21  1605 09/09/21  1852   TROPONINI 0.02* 0.02* <0.01       Urinalysis:      Lab Results   Component Value Date    NITRU Negative 09/09/2021    WBCUA 1 09/09/2021    BACTERIA 1+ 09/24/2020    RBCUA 1 09/09/2021    BLOODU TRACE 09/09/2021    SPECGRAV 1.010 09/09/2021    GLUCOSEU Negative 09/09/2021       Radiology:  US THYROID   Preliminary Result   Heterogeneous thyroid. Benign thyroid nodules for which additional follow-up is not recommended. CT CHEST WO CONTRAST   Preliminary Result   1. Small bilateral pleural effusions with mild dependent curvilinear and   consolidative opacities within both lower lobes, most likely passive   atelectasis, less likely aspiration or pneumonia. 2. Mild mediastinal lymphadenopathy, most likely benign and reactive in   etiology. Malignant lymphadenopathy is considered unlikely, though not   excluded. 3. Enlarged heterogeneous appearance of the thyroid gland. Recommend further   characterization with a routine non urgent thyroid ultrasound, as advised   below. 4. Findings suggestive of chronic pulmonary arterial hypertension. RECOMMENDATIONS:   Incidental heterogeneous and enlarged thyroid. Recommend thyroid US. Reference: J Am Kami Radiol. 2015 Feb;12(2): 143-50            XR CHEST (2 VW)   Final Result   Small right pleural effusion with adjacent atelectasis. Stable cardiomegaly.                  Assessment/Plan:    Active Hospital Problems    Diagnosis     Coronary artery disease [I25.10]      Priority: High     Class: Chronic    Essential hypertension [I10]      Priority: Medium     Class: Chronic    Acute on chronic congestive heart failure (HCC) [I50.9]     PAF (paroxysmal atrial fibrillation) (HCC) [I48.0]     Acute on chronic diastolic heart failure (HCC) [I50.33]     Pleural effusion [J90]      1. Acute on chronic congestive heart failure likely diastolic, could be triggered by A. fib, no recent change to medication, IV Lasix, cardiology consulted, strict I&O, echo pending  2. Chronic respiratory failure with hypoxia, on 3 L of oxygen at home  3. Paroxysmal atrial fibrillation, on oral anticoagulation  4. Essential hypertension, uncontrolled resumed p.o. medications, added lisinopril, will increase lisinopril today  5. Pleural effusion, likely due to heart failure, diuretics for now, no immediate indication for thoracentesis  6. Generalized weakness, due to above  7. Heterogeneous thyroid, found on CT scan, ultrasound ordered and noted without recommendation for follow-up. Patient can follow-up with her primary care physician  8. Hypokalemia, likely due to diuretics, replace      Diet: ADULT DIET; Regular;  Low Sodium (2 gm); 1500 ml  Code Status: Full Code      Ted Stoner MD

## 2021-09-10 NOTE — PROGRESS NOTES
[facility]   Daily Progress Note      Admit Date:  9/9/2021    CC: \"  The patient is 77 y.o. female with a past medical history significant for  CAD, atrial fib, HTN, aortic aneurysm and COPD who presents with the above complaint. Admits to several days of increasing dyspnea with minimal exertion. She denies chest pain, PND, orthopnea, dyspnea at rest, palpitations, or syncope. Subjective:  Pt with no acute overnight events. Denies chest pain, palpitations, and dyspnea. She had moderate diuresis but still complains of feeling short of breath. Her blood pressure remains elevated  Objective:   BP (!) 179/71   Pulse 56   Temp 97.8 °F (36.6 °C) (Oral)   Resp 18   Ht 5' 4\" (1.626 m)   Wt 225 lb 5 oz (102.2 kg)   SpO2 97%   BMI 38.67 kg/m²     Intake/Output Summary (Last 24 hours) at 9/10/2021 1133  Last data filed at 9/9/2021 2355  Gross per 24 hour   Intake 240 ml   Output 2750 ml   Net -2510 ml     Wt Readings from Last 3 Encounters:   09/10/21 225 lb 5 oz (102.2 kg)   08/23/21 222 lb (100.7 kg)   07/07/21 223 lb 1.7 oz (101.2 kg)     Telemetry:NSR,PAF    Physical Exam:  General:  NAD, Awake, alert and oriented X4  Skin:  Warm and dry  Neck:  Supple, mildly elevated JVP appreciated, no bruit  Chest: Diminished breath sounds  Cardiovascular:  Regular rate.  S1S2  Abdomen:  Soft, nontender, +bowel sounds  Extremities: 1-2+ LE edema    Cardiac Diagnosis:  hypertension, hyperlipidemia, coronary artery disease, CHF and atrial fibrillation    Medications:    [START ON 9/11/2021] lisinopril  10 mg Oral Daily    budesonide-formoterol  2 puff Inhalation BID    hydrALAZINE  50 mg Oral 3 times per day    isosorbide mononitrate  30 mg Oral Daily    metoprolol succinate  50 mg Oral Daily    rivaroxaban  20 mg Oral Daily with breakfast    rosuvastatin  20 mg Oral Daily    sodium chloride flush  5-40 mL IntraVENous 2 times per day    furosemide  40 mg IntraVENous BID    albuterol  2.5 mg Nebulization 4x daily    rOPINIRole  0.5 mg Oral Nightly      sodium chloride       albuterol sulfate HFA, nitroGLYCERIN, traZODone, sodium chloride flush, sodium chloride, ondansetron **OR** ondansetron, polyethylene glycol, acetaminophen **OR** acetaminophen    Lab Data:  CBC:   Recent Labs     21  1113   WBC 11.2*   HGB 11.4*        BMP:    Recent Labs     21  1113 09/10/21  0722    141   K 4.2 3.4*   CO2 30 35*   BUN 13 14   CREATININE 0.9 1.1     LIVR:   Recent Labs     21  1113   AST 17   ALT 12     INR:  No results for input(s): INR in the last 72 hours. APTT: No results for input(s): APTT in the last 72 hours. BNP:  No results for input(s): BNP in the last 72 hours. Imagin20Concentric LVH with normal LV size and wall motion. EF is    60%. Grade II diastolic dysfunction with elevated LV filling pressures. Trivial mitral regurgitation is present. The left atrium is severely dilated. Mild aortic regurgitation. Right ventricular systolic function is normal .   Trivial tricuspid regurgitation. Assessment:  1) Acute on chronic diastolic HF. NYHA class III on admission  -Had moderate diuresis with Lasix 40 mg IV twice daily  -She still appears fluid overloaded  -Will increase Lasix to 60 mg IV twice daily  -We will consider changing to p.o.  Lasix in next 24 to 48 hours depending on clinical response  -Await the results of echocardiogram  - strongly encouraged fluid and sodium restriction       HTN  -Blood pressure remains elevated in spite of being on hydralazine isosorbide lisinopril and metoprolol  -We will have her space her blood pressure medications  -Encouraged sodium and fluid restriction  -Blood pressure goal in presence of heart failure is less than 130/80      PAF  -Patient is having intermittent atrial fibrillation but remains asymptomatic  -Rate is currently controlled  -She is on chronic Xarelto therapy    Hypokaemia  -Secondary to diuretic therapy  -We will replace and follow renal function closely since she is on high-dose of Lasix therapy    Complexity of medical decision making- very high    Electronically signed by Deejay Nj MD on 9/10/2021 at 11:33 AM

## 2021-09-10 NOTE — CARE COORDINATION
INITIAL CASE MANAGEMENT ASSESSMENT    Reviewed chart, met with patient to assess possible discharge needs. Explained Case Management role/services. Living Situation: Patient lives with her  in a mobile home with 1 step to enter. ADLs: Independent     DME: Oxygen (not physician ordered. Friend gave to her.), walker, cane    PT/OT Recs: None in chart     Active Services:  None     Transportation: Non / transports     Medications: [address] in [place]/No barriers    PCP: Katie Henderson MD      HD/PD: N/A    PLAN/COMMENTS: Plan return to home with spouse. Follow for needs. SW/CM provided contact information for patient or family to call with any questions. SW/CM will follow and assist as needed.  Electronically signed by David De La Cruz RN on 9/10/2021 at 11:41 AM

## 2021-09-10 NOTE — CONSULTS
[facility]  HEART FAILURE PROGRAM      NAME:  Pinky Garcia RECORD NUMBER:  [id number]  AGE: 77 y.o.    GENDER: female  : 1945  TODAY'S DATE:  9/10/2021    Subjective:     VISIT TYPE: evaluation     ADMITTING PHYSICIAN:  Samina Swann MD    PAST MEDICAL HISTORY        Diagnosis Date    AAA (abdominal aortic aneurysm) ([facility]     pt states it is 4cm    AAA (abdominal aortic aneurysm) without rupture ([facility] 2/10/2015    Atrial fibrillation ([facility]     CAD (coronary artery disease)     CHF (congestive heart failure) ([facility]     COPD (chronic obstructive pulmonary disease) ([facility]     History of blood clots     Hyperlipidemia     Hypertension        SOCIAL HISTORY    Social History     Tobacco Use    Smoking status: Former Smoker     Packs/day: 1.00     Years: 37.00     Pack years: 37.00     Types: Cigarettes     Start date: 1976     Quit date: 2013     Years since quittin.9    Smokeless tobacco: Never Used    Tobacco comment: E cigarette now and then   Vaping Use    Vaping Use: Former   Substance Use Topics    Alcohol use: No    Drug use: No       ALLERGIES    Allergies   Allergen Reactions    Morphine Rash     rash    Other Rash       MEDICATIONS  Scheduled Meds:   furosemide  60 mg IntraVENous BID    [START ON 2021] lisinopril  10 mg Oral Daily    budesonide-formoterol  2 puff Inhalation BID    hydrALAZINE  50 mg Oral 3 times per day    isosorbide mononitrate  30 mg Oral Daily    metoprolol succinate  50 mg Oral Daily    rivaroxaban  20 mg Oral Daily with breakfast    rosuvastatin  20 mg Oral Daily    sodium chloride flush  5-40 mL IntraVENous 2 times per day    albuterol  2.5 mg Nebulization 4x daily    rOPINIRole  0.5 mg Oral Nightly       ADMIT DATE: 2021      Objective:     ADMISSION DIAGNOSIS:   Acute on chronic diastolic heart failure (HCC) [I50.33]  Elevated troponin [R77.8]  Pleural effusion on right [J90]  Acute on chronic a BETA BLOCKER:  Yes- toprol     SCALE AVAILABLE:  Yes - \"I have a weight watcher scale. It's a nice digital one\"    EDUCATION STATUS: Patient and Other: and pt's spouse Linda    [x]  Provided both written and verbal education on Heart Failure signs/symptoms. [x]  Provided instructions on daily medications. [x]  Provided instructions to monitor and record weight daily. [x]  Provided instructions to call if weight increases 3 lbs in one day or 5 lbs in one week. [x]  Received verbal acknowledgment/understanding of Heart Failure related causes. [x]  Provided instructions on how to maintain a low sodium diet. []  Provided recommendations for smoking cessation programs  [x]  Provided recommendations on activity and exercise     [x]  Other:    HF RN consult noted, chart reviewed. Pt came in with c/o's of SOB and LE edema. She is being treated for acute on chronic HF. Pt follows with Dr Robson Lackey and he is consulted and following. Pt's IV diuretic dose was increased to 60 mg BID and is negative 2.5 liters thus far. Upon entering room pt was awake in bed and her , Linda, was sitting in recliner sporting his [employer] hat and boots. I introduced myself and my role in pt's care and they both were agreeable to spend time with me. Pt stated \"I know I need to get back to doing what I should be doing like weighing myself. I just stopped for no reason. \" Teaching done on 'What is HF', S&S of HF, diastolic dysfunction, causes of DD, and importance of weighing daily. Pt states she does have a \"nice digital weight watcher scale at home that I can use. \" We went over the log sheet, '3/5 rule', HF Zones and who and when to call. I instructed her to use the term HF when calling if in yellow zone. Teaching done on HF being a chronic illness and needing daily management. Verbalized understanding. She is agreeable to start weighing again. Instructed her to take log sheet with her to appts.     Spouse, Linda, states pt does further visits, declines service.              Electronically signed by Shaye Falk RN, BSN CHFN  on 9/10/2021 at 4:06 PM

## 2021-09-10 NOTE — PLAN OF CARE
Problem: Falls - Risk of:  Goal: Will remain free from falls  Description: Will remain free from falls  Outcome: Ongoing  Goal: Absence of physical injury  Description: Absence of physical injury  Outcome: Ongoing     Problem: Pain:  Description: Pain management should include both nonpharmacologic and pharmacologic interventions.   Goal: Pain level will decrease  Description: Pain level will decrease  Outcome: Ongoing  Goal: Control of acute pain  Description: Control of acute pain  Outcome: Ongoing  Goal: Control of chronic pain  Description: Control of chronic pain  Outcome: Ongoing     Problem: OXYGENATION/RESPIRATORY FUNCTION  Goal: Patient will maintain patent airway  Outcome: Ongoing  Goal: Patient will achieve/maintain normal respiratory rate/effort  Description: Respiratory rate and effort will be within normal limits for the patient  Outcome: Ongoing     Problem: HEMODYNAMIC STATUS  Goal: Patient has stable vital signs and fluid balance  Outcome: Ongoing     Problem: FLUID AND ELECTROLYTE IMBALANCE  Goal: Fluid and electrolyte balance are achieved/maintained  Outcome: Ongoing     Problem: ACTIVITY INTOLERANCE/IMPAIRED MOBILITY  Goal: Mobility/activity is maintained at optimum level for patient  Outcome: Ongoing

## 2021-09-10 NOTE — PROGRESS NOTES
Patient alert and oriented x4, VSS, sitting up in bed. Patient denies n/v, diarrhea, pain, states she is having SOB especially during ambulation. Patient states she is having a productive cough at time that produces clear, thick sputum. Patient denies further needs. Bed in lowest and locked position, bed alarm in place. Non-slip socks on, call light within reach. Will continue to monitor pt needs.

## 2021-09-10 NOTE — CONSULTS
Cardiology Consultation     Hampton Bumpers  1945      Referring Physician: Dr. Polo Baxter   Reason for Referral: dyspnea   Chief Complaint:   Chief Complaint   Patient presents with    Shortness of Breath     for a couple of days, wears o2 at home, no known exposure to anyone with covid. Subjective:     History of Present Illness: The patient is 77 y.o. female with a past medical history significant for  CAD, atrial fib, HTN, aortic aneurysm and COPD who presents with the above complaint. Admits to several days of increasing dyspnea with minimal exertion. She denies chest pain, PND, orthopnea, dyspnea at rest, palpitations, or syncope. - Cardiac cath on 3/2/18 which revealed  of the RCA and nonobstructive CAD of the LAD and LCX. S    -Endovascular AAA repair on 3/21/18 by Dr. Deann Coburn. - S/p PVI ablation 10/2020, she then had Afib ablation last year but had recurrence of A fib She underwent DCCV 1/12/21,  repeat AFIB ablation 2/17/21           Past Medical History:   Diagnosis Date    AAA (abdominal aortic aneurysm) ([address]     pt states it is 4cm    AAA (abdominal aortic aneurysm) without rupture ([address] 2/10/2015    Atrial fibrillation ([address]     CAD (coronary artery disease)     CHF (congestive heart failure) (HCC)     COPD (chronic obstructive pulmonary disease) (HCC)     History of blood clots     Hyperlipidemia     Hypertension      Past Surgical History:   Procedure Laterality Date    ABDOMINAL AORTIC ANEURYSM REPAIR      Endovascular abdominal AA    APPENDECTOMY  1990    incidental    BLADDER SUSPENSION      CATARACT REMOVAL      CHOLECYSTECTOMY  10/15/13    COLONOSCOPY  9/2/07    dr Harinder King and check in 5 years.  COLONOSCOPY  06/16/2017    ok dr arndt, repeat 5 years   [address]    for benign tumor.  just the uterus    JOINT REPLACEMENT  12/2013    right knee replacement    TONSILLECTOMY  as a child    TUMOR EXCISION      benign behind right ear about      Family History   Problem Relation Age of Onset    Heart Disease Father     Hypertension Other      Social History     Tobacco Use    Smoking status: Former Smoker     Packs/day: 1.00     Years: 37.00     Pack years: 37.00     Types: Cigarettes     Start date: 1976     Quit date: 2013     Years since quittin.9    Smokeless tobacco: Never Used    Tobacco comment: E cigarette now and then   Vaping Use    Vaping Use: Former   Substance Use Topics    Alcohol use: No    Drug use: No       Allergies   Allergen Reactions    Morphine Rash     rash    Other Rash     Current Facility-Administered Medications   Medication Dose Route Frequency Provider Last Rate Last Admin    albuterol sulfate  (90 Base) MCG/ACT inhaler 2 puff  2 puff Inhalation Q4H PRN Ward Davey MD        budesonide-formoterol (SYMBICORT) 160-4.5 MCG/ACT inhaler 2 puff  2 puff Inhalation BID Ward Davey MD   2 puff at 21    hydrALAZINE (APRESOLINE) tablet 50 mg  50 mg Oral 3 times per day Ward Sheets MD   50 mg at 21    [START ON 9/10/2021] isosorbide mononitrate (IMDUR) extended release tablet 30 mg  30 mg Oral Daily Ward Davey MD        [START ON 9/10/2021] metoprolol succinate (TOPROL XL) extended release tablet 50 mg  50 mg Oral Daily Ward Davey MD        nitroGLYCERIN (NITROSTAT) SL tablet 0.4 mg  0.4 mg SubLINGual Q5 Min PRN Ward Cleary MD        [START ON 9/10/2021] rivaroxaban (XARELTO) tablet 20 mg  20 mg Oral Daily with breakfast Ward Cleary MD        [START ON 9/10/2021] rosuvastatin (CRESTOR) tablet 20 mg  20 mg Oral Daily Ward Davey MD        traZODone (DESYREL) tablet 50 mg  50 mg Oral Nightly PRN Ward Davey MD        sodium chloride flush 0.9 % injection 5-40 mL  5-40 mL IntraVENous 2 times per day Ward Davey MD   10 mL at 21    sodium chloride flush 0.9 % injection 5-40 mL  5-40 mL IntraVENous PRN Ward Davey MD        0.9 % sodium chloride infusion  25 mL IntraVENous PRN Ward Davey MD        ondansetron (ZOFRAN-ODT) disintegrating tablet 4 mg  4 mg Oral Q8H PRN Ward Davey MD        Or    ondansetron (ZOFRAN) injection 4 mg  4 mg IntraVENous Q6H PRN Ward Davey MD        polyethylene glycol (GLYCOLAX) packet 17 g  17 g Oral Daily PRN Ward Gonzales MD        acetaminophen (TYLENOL) tablet 650 mg  650 mg Oral Q6H PRN Ward Davey MD   650 mg at 09/09/21 1556    Or    acetaminophen (TYLENOL) suppository 650 mg  650 mg Rectal Q6H PRN Ward Davey MD        lisinopril (PRINIVIL;ZESTRIL) tablet 5 mg  5 mg Oral Daily Ward Davey MD        [START ON 9/10/2021] furosemide (LASIX) injection 40 mg  40 mg IntraVENous BID Merlinda Bass, MD       [facility] ON 9/10/2021] albuterol (PROVENTIL) nebulizer solution 2.5 mg  2.5 mg Nebulization 4x daily Ward Davey MD        rOPINIRole (REQUIP) tablet 0.5 mg  0.5 mg Oral Nightly Karina England, APRN - CNP           Review of Systems:  · Constitutional: No unanticipated weight loss. There's been no change in energy level, sleep pattern, or activity level. No fevers, chills. · Eyes: No visual changes or diplopia. No scleral icterus. · ENT: No Headaches, hearing loss or vertigo. No mouth sores or sore throat. · Cardiovascular: as reviewed in HPI  · Respiratory: No cough or wheezing, no sputum production. No hemoptysis. · Gastrointestinal: No abdominal pain, appetite loss, blood in stools. No change in bowel or bladder habits. · Genitourinary: No dysuria, trouble voiding, or hematuria. · Musculoskeletal:  No gait disturbance, no joint complaints. · Integumentary: No rash or pruritis. · Neurological: No headache, diplopia, change in muscle strength, numbness or tingling. · Psychiatric: No anxiety or depression. · Endocrine: No temperature intolerance.  No excessive thirst, fluid intake, or urination. No tremor. · Hematologic/Lymphatic: No abnormal bruising or bleeding, blood clots or swollen lymph nodes. · Allergic/Immunologic: No nasal congestion or hives. Physical Exam:   /80   Pulse 70   Temp 98.2 °F (36.8 °C) (Oral)   Resp 22   Ht 5' 4\" (1.626 m)   Wt 226 lb 13.7 oz (102.9 kg)   SpO2 98%   BMI 38.94 kg/m²   Wt Readings from Last 3 Encounters:   21 226 lb 13.7 oz (102.9 kg)   21 222 lb (100.7 kg)   21 223 lb 1.7 oz (101.2 kg)     Constitutional: She is oriented to person, place, and time. She appears well-developed and well-nourished. In no acute distress. Head: Normocephalic and atraumatic. Pupils equal and round. Neck: Neck supple. No JVP or carotid bruit appreciated. No mass and no thyromegaly present. No lymphadenopathy present. Cardiovascular: Normal rate. Normal heart sounds. Exam reveals no gallop and no friction rub. No murmur heard. Pulmonary/Chest: LL rales   Abdominal: Soft, non-tender. Bowel sounds are normal. She exhibits no organomegaly, mass or bruit. Extremities:2+  edema. No cyanosis or clubbing. Pulses are 2+ radial/carotid bilaterally. Neurological: No gross cranial nerve deficit. Coordination normal.   Skin: Skin is warm and dry. There is no rash or diaphoresis. Psychiatric: She has a normal mood and affect.  Her speech is normal and behavior is normal.     Lab Review:   FLP:    Lab Results   Component Value Date    TRIG 144 2021    HDL 45 2021    HDL 38 2011    LDLCALC 60 2021    LDLDIRECT 111 2012    LABVLDL 29 2021     BUN/Creatinine:    Lab Results   Component Value Date    BUN 13 2021    CREATININE 0.9 2021     PT/INR, TNI, HGB A1C:   Lab Results   Component Value Date/Time    TROPONINI <0.01 2021 06:52 PM    LABA1C 5.6 2019 02:38 PM      No results found for: CBCAUTODIF    EK2018: Sinus rhythm with old anterior infarct  18: Sinus Rhythm, PACs, Old anterior infarct. 2/6/19: Sinus Rhythm  8/4/20: Sinus  Rhythm  - occasional PAC, # PACs = 1, -Anteroseptal infarct -age undetermined.    -Nonspecific ST depression  -Nondiagnostic. 6/25/21: Sinus rhythm  -First degree A-V block , -Anteroseptal infarct -age undetermined.      Echo 1/9/2018:  Mild concentric left ventricular hypertrophy. Visually estimated ejection fraction of 65%. Mitral annular calcification. Mild left atrial dilation. Mild aortic stenosis. (mean gradients [id number])  The systolic pulmonary artery pressure (SPAP) estimated at 30 mmHg  (estimated RA pressure of 8 mmHg included).     [facility]: ([facility]) 4/2017   of RCA chronic LAD 10-20% RA 4 PA24/9 16 wedge 9 LVEDP markedly elevated 30     Carotid US 10/2017 at [address]:  1. Estimated diameter reduction of the right internal carotid artery is  less than 50%. 2.  Estimated diameter reduction of the left internal carotid artery is  50-69%. 3.  The bilateral common carotid arteries reveal no evidence of   significant stenosis. 4.  There is greater than 50% stenosis of the right external carotid  artery. 5.  There is no evidence of significant stenosis in the left external  carotid artery. 6.  The bilateral vertebral arteries are patent with antegrade flow. 7.  The bilateral subclavian arteries reveal no evidence of significant  stenosis. 8.  There has been no significant change from the previous study of  4/21/2017.     Lexiscan 2/19/18   Summary    Abnormal study. There is a moderate sized, mild intensity, partially    reversible defect of the mid to apical anterior and mid anterolateral walls    which is consistent with ischemia. There is breast attenuation but stress    images appear worse.    Normal LV size and systolic function.    Overall findings represent an intermediate risk study      Cardiac Cath 3/5/18  OVERALL IMPRESSION  1.  Again, 100% proximal right coronary artery occlusion with left to right  collaterals.   2. Esta Shear disease of the distal left main trunk. 3.  20% to 30% ostial left anterior descending artery stenosis with  collaterals from LAD to the right coronary artery. 4.  30% to 40% stenosis of the proximal circumflex artery. 5.  Normal left ventricular systolic function with estimated EF of 55% to  60%. 6.  Slightly enlarged aortic root with no evidence of aortic stenosis or  regurgitation.     In view of the above findings, we will okay the patient for her upcoming  aortic aneurysm surgery from cardiac standpoint.     ECHO 1/2/20  There is moderate concentric left ventricular hypertrophy. Patient appears to be in atrial fibrillation. Ejection fraction is estimated to be 50-60%. Indeterminate diastolic function. Mild aortic regurgitation. Mild tricuspid regurgitation. Mild mitral regurgitation with mitral annular calcification     Right Heart Cath 2/28/2020  1.  _____ normal right cardiac pressure. 2.  Elevated pulmonary capillary wedge pressure with a mean pulmonary  capillary wedge of 29 mmHg. 3.  Normal cardiac output and indices. 4.  No oxygen step off to suggest ASD/PFO.    In view of the above findings, we will start the patient on a small dose  of diuretic therapy and see whether we need to adjust some of her  antihypertensive medicines or not.  Her findings are consistent with a  diastolic heart failure.       All above diagnostic testing and laboratory data was independently visualized and reviewed by me (not simply review of report)       Assessment and Plan   1) acute on chronic diastolic heart failure   - NYHA class III   - lasix 40mg IV q12 h for 24-48hrs   - strict BP control   - outpatient sleep / bariatric evaluation     2) CAD   - asa  -  statin  - beta blockade/imdur     3) Essential hypertension  - controlled   - continue current therapy      4)  paroxysmal atrial fibrillation   - toprol   - xarelto      Overall, the problems requiring hospitalization are high in severity     Thank you very

## 2021-09-11 LAB
ANION GAP SERPL CALCULATED.3IONS-SCNC: 12 MMOL/L (ref 3–16)
BUN BLDV-MCNC: 24 MG/DL (ref 7–20)
CALCIUM SERPL-MCNC: 8.8 MG/DL (ref 8.3–10.6)
CHLORIDE BLD-SCNC: 94 MMOL/L (ref 99–110)
CO2: 33 MMOL/L (ref 21–32)
CREAT SERPL-MCNC: 1.4 MG/DL (ref 0.6–1.2)
GFR AFRICAN AMERICAN: 44
GFR NON-AFRICAN AMERICAN: 37
GLUCOSE BLD-MCNC: 94 MG/DL (ref 70–99)
MAGNESIUM: 1.9 MG/DL (ref 1.8–2.4)
POTASSIUM SERPL-SCNC: 3.9 MMOL/L (ref 3.5–5.1)
SODIUM BLD-SCNC: 139 MMOL/L (ref 136–145)

## 2021-09-11 PROCEDURE — 6370000000 HC RX 637 (ALT 250 FOR IP): Performed by: INTERNAL MEDICINE

## 2021-09-11 PROCEDURE — 80048 BASIC METABOLIC PNL TOTAL CA: CPT

## 2021-09-11 PROCEDURE — 1200000000 HC SEMI PRIVATE

## 2021-09-11 PROCEDURE — 94640 AIRWAY INHALATION TREATMENT: CPT

## 2021-09-11 PROCEDURE — 99233 SBSQ HOSP IP/OBS HIGH 50: CPT | Performed by: INTERNAL MEDICINE

## 2021-09-11 PROCEDURE — 6360000002 HC RX W HCPCS: Performed by: INTERNAL MEDICINE

## 2021-09-11 PROCEDURE — 2700000000 HC OXYGEN THERAPY PER DAY

## 2021-09-11 PROCEDURE — 2580000003 HC RX 258: Performed by: INTERNAL MEDICINE

## 2021-09-11 PROCEDURE — 94761 N-INVAS EAR/PLS OXIMETRY MLT: CPT

## 2021-09-11 PROCEDURE — 36415 COLL VENOUS BLD VENIPUNCTURE: CPT

## 2021-09-11 PROCEDURE — 83735 ASSAY OF MAGNESIUM: CPT

## 2021-09-11 RX ADMIN — METOPROLOL SUCCINATE 50 MG: 50 TABLET, EXTENDED RELEASE ORAL at 08:49

## 2021-09-11 RX ADMIN — SODIUM CHLORIDE, PRESERVATIVE FREE 10 ML: 5 INJECTION INTRAVENOUS at 00:41

## 2021-09-11 RX ADMIN — RIVAROXABAN 20 MG: 20 TABLET, FILM COATED ORAL at 08:48

## 2021-09-11 RX ADMIN — ALBUTEROL SULFATE 2.5 MG: 2.5 SOLUTION RESPIRATORY (INHALATION) at 08:05

## 2021-09-11 RX ADMIN — TRAZODONE HYDROCHLORIDE 50 MG: 50 TABLET ORAL at 21:28

## 2021-09-11 RX ADMIN — HYDRALAZINE HYDROCHLORIDE 50 MG: 50 TABLET, FILM COATED ORAL at 04:53

## 2021-09-11 RX ADMIN — ISOSORBIDE MONONITRATE 30 MG: 30 TABLET, EXTENDED RELEASE ORAL at 08:49

## 2021-09-11 RX ADMIN — ALBUTEROL SULFATE 2.5 MG: 2.5 SOLUTION RESPIRATORY (INHALATION) at 11:36

## 2021-09-11 RX ADMIN — HYDRALAZINE HYDROCHLORIDE 50 MG: 50 TABLET, FILM COATED ORAL at 21:28

## 2021-09-11 RX ADMIN — SODIUM CHLORIDE, PRESERVATIVE FREE 10 ML: 5 INJECTION INTRAVENOUS at 08:50

## 2021-09-11 RX ADMIN — ALBUTEROL SULFATE 2.5 MG: 2.5 SOLUTION RESPIRATORY (INHALATION) at 15:44

## 2021-09-11 RX ADMIN — FUROSEMIDE 60 MG: 10 INJECTION, SOLUTION INTRAMUSCULAR; INTRAVENOUS at 08:49

## 2021-09-11 RX ADMIN — ROSUVASTATIN CALCIUM 20 MG: 20 TABLET, FILM COATED ORAL at 08:48

## 2021-09-11 RX ADMIN — BUDESONIDE AND FORMOTEROL FUMARATE DIHYDRATE 2 PUFF: 160; 4.5 AEROSOL RESPIRATORY (INHALATION) at 08:05

## 2021-09-11 RX ADMIN — BUDESONIDE AND FORMOTEROL FUMARATE DIHYDRATE 2 PUFF: 160; 4.5 AEROSOL RESPIRATORY (INHALATION) at 21:19

## 2021-09-11 RX ADMIN — LISINOPRIL 10 MG: 10 TABLET ORAL at 11:23

## 2021-09-11 RX ADMIN — HYDRALAZINE HYDROCHLORIDE 50 MG: 50 TABLET, FILM COATED ORAL at 15:13

## 2021-09-11 RX ADMIN — SODIUM CHLORIDE, PRESERVATIVE FREE 10 ML: 5 INJECTION INTRAVENOUS at 21:28

## 2021-09-11 RX ADMIN — ALBUTEROL SULFATE 2.5 MG: 2.5 SOLUTION RESPIRATORY (INHALATION) at 21:19

## 2021-09-11 ASSESSMENT — PAIN SCALES - GENERAL
PAINLEVEL_OUTOF10: 0
PAINLEVEL_OUTOF10: 0

## 2021-09-11 NOTE — PLAN OF CARE
Problem: Falls - Risk of:  Goal: Will remain free from falls  Description: Will remain free from falls  9/11/2021 1717 by Ajith Lazo RN  Outcome: Ongoing  9/11/2021 0327 by Fredrick Leonard RN  Outcome: Ongoing  Note: D: Pt. Alert and oriented, calls for assist as needed, bed alarm on and call light in reach  A: encouraged to call for assist as needed  R: without falls this shift  Goal: Absence of physical injury  Description: Absence of physical injury  Outcome: Ongoing     Problem: Pain:  Description: Pain management should include both nonpharmacologic and pharmacologic interventions.   Goal: Pain level will decrease  Description: Pain level will decrease  Outcome: Ongoing  Goal: Control of acute pain  Description: Control of acute pain  Outcome: Ongoing  Goal: Control of chronic pain  Description: Control of chronic pain  Outcome: Ongoing     Problem: OXYGENATION/RESPIRATORY FUNCTION  Goal: Patient will maintain patent airway  Outcome: Ongoing  Goal: Patient will achieve/maintain normal respiratory rate/effort  Description: Respiratory rate and effort will be within normal limits for the patient  Outcome: Ongoing     Problem: HEMODYNAMIC STATUS  Goal: Patient has stable vital signs and fluid balance  Outcome: Ongoing     Problem: FLUID AND ELECTROLYTE IMBALANCE  Goal: Fluid and electrolyte balance are achieved/maintained  Outcome: Ongoing     Problem: ACTIVITY INTOLERANCE/IMPAIRED MOBILITY  Goal: Mobility/activity is maintained at optimum level for patient  Outcome: Ongoing

## 2021-09-11 NOTE — CONSULTS
Nutrition Education    · Verbally reviewed information with Patient and   · RD provided written and verbal nutrition education. Education went over not adding salt to foods, choosing fresh/frozen vegetables over canned, avoiding high salt meats (i.e. barboza, sausage, hot dogs, lunch meat), and choosing low sodium snack options. Education also went over 64 oz fluid restriction and what counts as a fluid. · Pt states adding \"no salt\" to foods and using frozen vegetables to cook with. Pt also reports eating out at [facility], but will limit consumption now that she knows they add salt to their burgers. · Written educational materials provided. · Contact name and number provided. · Refer to Patient Education activity for more details.     Electronically signed by Cara Felix RD, LD on 9/11/21 at 10:54 AM EDT    Contact: [phone]

## 2021-09-11 NOTE — PROGRESS NOTES
Hospitalist Progress Note      PCP: Gunner Gregory MD    Date of Admission: 9/9/2021        Subjective: Is okay, no abdominal pain, no chest pain shortness of breath at rest no palpitation. No muscle cramps      Medications:  Reviewed    Infusion Medications    sodium chloride       Scheduled Medications    furosemide  60 mg IntraVENous BID    lisinopril  10 mg Oral Daily    budesonide-formoterol  2 puff Inhalation BID    hydrALAZINE  50 mg Oral 3 times per day    isosorbide mononitrate  30 mg Oral Daily    metoprolol succinate  50 mg Oral Daily    rivaroxaban  20 mg Oral Daily with breakfast    rosuvastatin  20 mg Oral Daily    sodium chloride flush  5-40 mL IntraVENous 2 times per day    albuterol  2.5 mg Nebulization 4x daily    rOPINIRole  0.5 mg Oral Nightly     PRN Meds: albuterol sulfate HFA, nitroGLYCERIN, traZODone, sodium chloride flush, sodium chloride, ondansetron **OR** ondansetron, polyethylene glycol, acetaminophen **OR** acetaminophen      Intake/Output Summary (Last 24 hours) at 9/11/2021 0947  Last data filed at 9/11/2021 0644  Gross per 24 hour   Intake 240 ml   Output 1400 ml   Net -1160 ml       Physical Exam Performed:    BP (!) 147/63   Pulse 58   Temp 98.2 °F (36.8 °C) (Oral)   Resp 18   Ht 5' 4\" (1.626 m)   Wt 221 lb 9 oz (100.5 kg)   SpO2 97%   BMI 38.03 kg/m²     General appearance: No apparent distress  Neck: Supple  Respiratory:  Normal respiratory effort. Clear to auscultation, bilaterally without Rales/Wheezes/Rhonchi. Cardiovascular: Regular rate and rhythm with normal S1/S2 without murmurs, rubs or gallops. Abdomen: Soft, non-tender, non-distended  Musculoskeletal: No clubbing, cyanosis   Skin: Skin color, texture, turgor normal.  No rashes or lesions.   Neurologic:  No focal weakness   Psychiatric: Alert and oriented  Capillary Refill: Brisk,3 seconds, normal   Peripheral Pulses: +2 palpable, equal bilaterally       Labs:   Recent Labs     09/09/21  1113 WBC 11.2*   HGB 11.4*   HCT 35.2*        Recent Labs     09/09/21  1113 09/10/21  0722 09/11/21  0621    141 139   K 4.2 3.4* 3.9    98* 94*   CO2 30 35* 33*   BUN 13 14 24*   CREATININE 0.9 1.1 1.4*   CALCIUM 9.8 9.4 8.8     Recent Labs     09/09/21  1113   AST 17   ALT 12   BILITOT 0.5   ALKPHOS 92     No results for input(s): INR in the last 72 hours. Recent Labs     09/09/21  1113 09/09/21  1605 09/09/21  1852   TROPONINI 0.02* 0.02* <0.01       Urinalysis:      Lab Results   Component Value Date    NITRU Negative 09/09/2021    WBCUA 1 09/09/2021    BACTERIA 1+ 09/24/2020    RBCUA 1 09/09/2021    BLOODU TRACE 09/09/2021    SPECGRAV 1.010 09/09/2021    GLUCOSEU Negative 09/09/2021       Radiology:  US THYROID   Preliminary Result   Heterogeneous thyroid. Benign thyroid nodules for which additional follow-up is not recommended. CT CHEST WO CONTRAST   Final Result   1. Small bilateral pleural effusions with mild dependent curvilinear and   consolidative opacities within both lower lobes, most likely passive   atelectasis, less likely aspiration or pneumonia. 2. Mild mediastinal lymphadenopathy, most likely benign and reactive in   etiology. Malignant lymphadenopathy is considered unlikely, though not   excluded. 3. Enlarged heterogeneous appearance of the thyroid gland. Recommend further   characterization with a routine non urgent thyroid ultrasound, as advised   below. 4. Findings suggestive of chronic pulmonary arterial hypertension. RECOMMENDATIONS:   Incidental heterogeneous and enlarged thyroid. Recommend thyroid US. Reference: J Am Kami Radiol. 2015 Feb;12(2): 143-50            XR CHEST (2 VW)   Final Result   Small right pleural effusion with adjacent atelectasis. Stable cardiomegaly.                  Assessment/Plan:    Active Hospital Problems    Diagnosis     Coronary artery disease [I25.10]      Priority: High     Class: Chronic    Essential hypertension [I10]      Priority: Medium     Class: Chronic    Elevated troponin [R77.8]     Acute on chronic congestive heart failure (HCC) [I50.9]     PAF (paroxysmal atrial fibrillation) (HCC) [I48.0]     Acute on chronic diastolic heart failure (HCC) [I50.33]     Pleural effusion [J90]      1.  Acute on chronic congestive heart failure likely diastolic, could be triggered by A. fib, no recent change to medication, IV Lasix, cardiology consulted, strict I&O, echo preserved EF, creatinine increased today we will keep Lasix on hold pending further cardiology recommendations  2.  Chronic respiratory failure with hypoxia, on 3 L of oxygen at home  3.  Paroxysmal atrial fibrillation, on oral anticoagulation  4.  Essential hypertension, uncontrolled resumed p.o. medications, added lisinopril, then increased dose, better controlled at this time, continue to monitor. 5.    Pleural effusion, likely due to heart failure, diuretics for now, no immediate indication for thoracentesis  6.  Generalized weakness, due to above  7.  Heterogeneous thyroid, found on CT scan, ultrasound ordered and noted without recommendation for follow-up. Patient can follow-up with her primary care physician        Diet: ADULT DIET; Regular;  Low Sodium (2 gm); 1500 ml  Code Status: Full Code        Wlison Peralta MD

## 2021-09-11 NOTE — PLAN OF CARE
Problem: Falls - Risk of:  Goal: Will remain free from falls  Description: Will remain free from falls  Outcome: Ongoing  Note: D: Pt.  Alert and oriented, calls for assist as needed, bed alarm on and call light in reach  A: encouraged to call for assist as needed  R: without falls this shift

## 2021-09-11 NOTE — PROGRESS NOTES
Cardiology Consultation     Reyna Mortimer  1945      Referring Physician: Dr. Ying Feliciano   Reason for Referral: dyspnea   Chief Complaint:   Chief Complaint   Patient presents with    Shortness of Breath     for a couple of days, wears o2 at home, no known exposure to anyone with covid. Interval history:  Dyspnea improved  Lying flat in bed  Lasix on hold due to MARK    Subjective:     History of Present Illness: The patient is 77 y.o. female with a past medical history significant for  CAD, atrial fib, HTN, aortic aneurysm and COPD who presents with the above complaint. Admits to several days of increasing dyspnea with minimal exertion. She denies chest pain, PND, orthopnea, dyspnea at rest, palpitations, or syncope. - Cardiac cath on 3/2/18 which revealed  of the RCA and nonobstructive CAD of the LAD and LCX. S    -Endovascular AAA repair on 3/21/18 by Dr. Jacinto Montez. - S/p PVI ablation 10/2020, she then had Afib ablation last year but had recurrence of A fib She underwent DCCV 1/12/21,  repeat AFIB ablation 2/17/21           Past Medical History:   Diagnosis Date    AAA (abdominal aortic aneurysm) ([address]     pt states it is 4cm    AAA (abdominal aortic aneurysm) without rupture ([address] 2/10/2015    Atrial fibrillation ([address]     CAD (coronary artery disease)     CHF (congestive heart failure) (HCC)     COPD (chronic obstructive pulmonary disease) (HCC)     History of blood clots     Hyperlipidemia     Hypertension      Past Surgical History:   Procedure Laterality Date    ABDOMINAL AORTIC ANEURYSM REPAIR      Endovascular abdominal AA    APPENDECTOMY  1990    incidental    BLADDER SUSPENSION      CATARACT REMOVAL      CHOLECYSTECTOMY  10/15/13    COLONOSCOPY  9/2/07    dr Dante Waddell and check in 5 years.  COLONOSCOPY  06/16/2017    ok dr arndt, repeat 5 years   [address]    for benign tumor.  just the uterus    JOINT REPLACEMENT  12/2013 right knee replacement    TONSILLECTOMY  as a child    TUMOR EXCISION      benign behind right ear about      Family History   Problem Relation Age of Onset    Heart Disease Father     Hypertension Other      Social History     Tobacco Use    Smoking status: Former Smoker     Packs/day: 1.00     Years: 37.00     Pack years: 37.00     Types: Cigarettes     Start date: 1976     Quit date: 2013     Years since quittin.9    Smokeless tobacco: Never Used    Tobacco comment: E cigarette now and then   Vaping Use    Vaping Use: Former   Substance Use Topics    Alcohol use: No    Drug use: No       Allergies   Allergen Reactions    Morphine Rash     rash    Other Rash     Current Facility-Administered Medications   Medication Dose Route Frequency Provider Last Rate Last Admin    lisinopril (PRINIVIL;ZESTRIL) tablet 10 mg  10 mg Oral Daily Dejuan Brandon MD   10 mg at 21 1123    albuterol sulfate  (90 Base) MCG/ACT inhaler 2 puff  2 puff Inhalation Q4H PRN Ward Davey MD   2 puff at 09/10/21 0510    budesonide-formoterol (SYMBICORT) 160-4.5 MCG/ACT inhaler 2 puff  2 puff Inhalation BID Ward Davey MD   2 puff at 21 0805    hydrALAZINE (APRESOLINE) tablet 50 mg  50 mg Oral 3 times per day Ward Jain MD   50 mg at 21 1513    isosorbide mononitrate (IMDUR) extended release tablet 30 mg  30 mg Oral Daily Ward Davey MD   30 mg at 21 0849    metoprolol succinate (TOPROL XL) extended release tablet 50 mg  50 mg Oral Daily Ward Davey MD   50 mg at 21 0849    nitroGLYCERIN (NITROSTAT) SL tablet 0.4 mg  0.4 mg SubLINGual Q5 Min PRN Ward Davey MD        rivaroxaban (XARELTO) tablet 20 mg  20 mg Oral Daily with breakfast Ward Davey MD   20 mg at 21 0848    rosuvastatin (CRESTOR) tablet 20 mg  20 mg Oral Daily Ward Davey MD   20 mg at 21 0848    traZODone (DESYREL) tablet 50 mg  50 mg Oral Nightly PRN Ward Sheets MD   50 mg at 09/09/21 2318    sodium chloride flush 0.9 % injection 5-40 mL  5-40 mL IntraVENous 2 times per day Ward Cleary MD   10 mL at 09/11/21 0850    sodium chloride flush 0.9 % injection 5-40 mL  5-40 mL IntraVENous PRN Ward Davey MD        0.9 % sodium chloride infusion  25 mL IntraVENous PRN Ward Davey MD        ondansetron (ZOFRAN-ODT) disintegrating tablet 4 mg  4 mg Oral Q8H PRN Ward Davey MD        Or    ondansetron (ZOFRAN) injection 4 mg  4 mg IntraVENous Q6H PRN Ward Davey MD        polyethylene glycol (GLYCOLAX) packet 17 g  17 g Oral Daily PRN Ward Davey MD        acetaminophen (TYLENOL) tablet 650 mg  650 mg Oral Q6H PRN Ward Davey MD   650 mg at 09/10/21 0501    Or    acetaminophen (TYLENOL) suppository 650 mg  650 mg Rectal Q6H PRN Ward Davey MD        albuterol (PROVENTIL) nebulizer solution 2.5 mg  2.5 mg Nebulization 4x daily Ward Davey MD   2.5 mg at 09/11/21 1544    rOPINIRole (REQUIP) tablet 0.5 mg  0.5 mg Oral Nightly Karina England, APRN - CNP           Review of Systems:  · Constitutional: No unanticipated weight loss. There's been no change in energy level, sleep pattern, or activity level. No fevers, chills. · Eyes: No visual changes or diplopia. No scleral icterus. · ENT: No Headaches, hearing loss or vertigo. No mouth sores or sore throat. · Cardiovascular: as reviewed in HPI  · Respiratory: No cough or wheezing, no sputum production. No hemoptysis. · Gastrointestinal: No abdominal pain, appetite loss, blood in stools. No change in bowel or bladder habits. · Genitourinary: No dysuria, trouble voiding, or hematuria. · Musculoskeletal:  No gait disturbance, no joint complaints. · Integumentary: No rash or pruritis. · Neurological: No headache, diplopia, change in muscle strength, numbness or tingling.    · Psychiatric: No anxiety or depression. · Endocrine: No temperature intolerance. No excessive thirst, fluid intake, or urination. No tremor. · Hematologic/Lymphatic: No abnormal bruising or bleeding, blood clots or swollen lymph nodes. · Allergic/Immunologic: No nasal congestion or hives. Physical Exam:   BP (!) 134/49   Pulse 61   Temp 98.2 °F (36.8 °C) (Oral)   Resp 18   Ht 5' 4\" (1.626 m)   Wt 221 lb 9 oz (100.5 kg)   SpO2 98%   BMI 38.03 kg/m²   Wt Readings from Last 3 Encounters:   09/11/21 221 lb 9 oz (100.5 kg)   08/23/21 222 lb (100.7 kg)   07/07/21 223 lb 1.7 oz (101.2 kg)     Constitutional: She is oriented to person, place, and time. She appears well-developed and well-nourished. In no acute distress. Head: Normocephalic and atraumatic. Pupils equal and round. Neck: Neck supple. No JVP or carotid bruit appreciated. No mass and no thyromegaly present. No lymphadenopathy present. Cardiovascular: Normal rate. Normal heart sounds. Exam reveals no gallop and no friction rub. No murmur heard. Pulmonary/Chest: LL rales   Abdominal: Soft, non-tender. Bowel sounds are normal. She exhibits no organomegaly, mass or bruit. Extremities:2+  edema. No cyanosis or clubbing. Pulses are 2+ radial/carotid bilaterally. Neurological: No gross cranial nerve deficit. Coordination normal.   Skin: Skin is warm and dry. There is no rash or diaphoresis. Psychiatric: She has a normal mood and affect.  Her speech is normal and behavior is normal.     Lab Review:   FLP:    Lab Results   Component Value Date    TRIG 59 09/10/2021    HDL 56 09/10/2021    HDL 38 09/09/2011    LDLCALC 39 09/10/2021    LDLDIRECT 111 09/06/2012    LABVLDL 12 09/10/2021     BUN/Creatinine:    Lab Results   Component Value Date    BUN 24 09/11/2021    CREATININE 1.4 09/11/2021     PT/INR, TNI, HGB A1C:   Lab Results   Component Value Date/Time    TROPONINI <0.01 09/09/2021 06:52 PM    LABA1C 5.6 05/23/2019 02:38 PM      No results found for: CBCAUTODIF    EKG: 2/13/2018: Sinus rhythm with old anterior infarct  5/1/18: Sinus Rhythm, PACs, Old anterior infarct. 2/6/19: Sinus Rhythm  8/4/20: Sinus  Rhythm  - occasional PAC, # PACs = 1, -Anteroseptal infarct -age undetermined.    -Nonspecific ST depression  -Nondiagnostic. 6/25/21: Sinus rhythm  -First degree A-V block , -Anteroseptal infarct -age undetermined.      Echo 1/9/2018:  Mild concentric left ventricular hypertrophy. Visually estimated ejection fraction of 65%. Mitral annular calcification. Mild left atrial dilation. Mild aortic stenosis. (mean gradients 54CGWE)  The systolic pulmonary artery pressure (SPAP) estimated at 30 mmHg  (estimated RA pressure of 8 mmHg included).     [facility]: ([facility]) 4/2017   of RCA chronic LAD 10-20% RA 4 PA24/9 16 wedge 9 LVEDP markedly elevated 30     Carotid US 10/2017 at [facility]:  1. Estimated diameter reduction of the right internal carotid artery is  less than 50%. 2.  Estimated diameter reduction of the left internal carotid artery is  50-69%. 3.  The bilateral common carotid arteries reveal no evidence of   significant stenosis. 4.  There is greater than 50% stenosis of the right external carotid  artery. 5.  There is no evidence of significant stenosis in the left external  carotid artery. 6.  The bilateral vertebral arteries are patent with antegrade flow. 7.  The bilateral subclavian arteries reveal no evidence of significant  stenosis. 8.  There has been no significant change from the previous study of  4/21/2017.     Lexiscan 2/19/18   Summary    Abnormal study. There is a moderate sized, mild intensity, partially    reversible defect of the mid to apical anterior and mid anterolateral walls    which is consistent with ischemia.  There is breast attenuation but stress    images appear worse.    Normal LV size and systolic function.    Overall findings represent an intermediate risk study      Cardiac Cath 3/5/18  OVERALL IMPRESSION  1.  Again, 100% proximal right coronary artery occlusion with left to right  collaterals. 2.  Mild disease of the distal left main trunk. 3.  20% to 30% ostial left anterior descending artery stenosis with  collaterals from LAD to the right coronary artery. 4.  30% to 40% stenosis of the proximal circumflex artery. 5.  Normal left ventricular systolic function with estimated EF of 55% to  60%. 6.  Slightly enlarged aortic root with no evidence of aortic stenosis or  regurgitation.     In view of the above findings, we will okay the patient for her upcoming  aortic aneurysm surgery from cardiac standpoint.     ECHO 1/2/20  There is moderate concentric left ventricular hypertrophy. Patient appears to be in atrial fibrillation. Ejection fraction is estimated to be 50-60%. Indeterminate diastolic function. Mild aortic regurgitation. Mild tricuspid regurgitation. Mild mitral regurgitation with mitral annular calcification     Right Heart Cath 2/28/2020  1.  _____ normal right cardiac pressure. 2.  Elevated pulmonary capillary wedge pressure with a mean pulmonary  capillary wedge of 29 mmHg. 3.  Normal cardiac output and indices. 4.  No oxygen step off to suggest ASD/PFO.    In view of the above findings, we will start the patient on a small dose  of diuretic therapy and see whether we need to adjust some of her  antihypertensive medicines or not.  Her findings are consistent with a  diastolic heart failure.       All above diagnostic testing and laboratory data was independently visualized and reviewed by me (not simply review of report)       Assessment and Plan   1) acute on chronic diastolic heart failure   - NYHA class II  - resolved, lying flat, lasix on hold due to elevated creatinine, once back to baseline recommend lasix 40mg po qd on discharge   - strict BP control   - outpatient sleep / bariatric evaluation     2) CAD   - asa  -  statin  - beta blockade/imdur     3) Essential hypertension  - controlled   - continue current therapy      4)  paroxysmal atrial fibrillation   - toprol   - xarelto      Cardiology will sign off, reconsult PRN     Thank you very much for allowing me to participate in the care of your patient. Please do not hesitate to contact me if you have any questions.       Leah Stiles MD [address] [place] [address], Interventional Cardiology, and Peripheral Vascular Disease   [facility]   Ph: [phone]  Fax: [phone]

## 2021-09-12 VITALS
DIASTOLIC BLOOD PRESSURE: 67 MMHG | WEIGHT: 221.56 LBS | SYSTOLIC BLOOD PRESSURE: 149 MMHG | BODY MASS INDEX: 37.83 KG/M2 | HEART RATE: 65 BPM | TEMPERATURE: 97.8 F | RESPIRATION RATE: 17 BRPM | OXYGEN SATURATION: 96 % | HEIGHT: 64 IN

## 2021-09-12 LAB
ANION GAP SERPL CALCULATED.3IONS-SCNC: 10 MMOL/L (ref 3–16)
BUN BLDV-MCNC: 22 MG/DL (ref 7–20)
CALCIUM SERPL-MCNC: 9.1 MG/DL (ref 8.3–10.6)
CHLORIDE BLD-SCNC: 96 MMOL/L (ref 99–110)
CO2: 35 MMOL/L (ref 21–32)
CREAT SERPL-MCNC: 1.2 MG/DL (ref 0.6–1.2)
GFR AFRICAN AMERICAN: 53
GFR NON-AFRICAN AMERICAN: 44
GLUCOSE BLD-MCNC: 96 MG/DL (ref 70–99)
MAGNESIUM: 2 MG/DL (ref 1.8–2.4)
POTASSIUM SERPL-SCNC: 4.1 MMOL/L (ref 3.5–5.1)
PRO-BNP: 1641 PG/ML (ref 0–449)
SODIUM BLD-SCNC: 141 MMOL/L (ref 136–145)

## 2021-09-12 PROCEDURE — 97161 PT EVAL LOW COMPLEX 20 MIN: CPT

## 2021-09-12 PROCEDURE — 6370000000 HC RX 637 (ALT 250 FOR IP): Performed by: INTERNAL MEDICINE

## 2021-09-12 PROCEDURE — 36415 COLL VENOUS BLD VENIPUNCTURE: CPT

## 2021-09-12 PROCEDURE — 6360000002 HC RX W HCPCS: Performed by: INTERNAL MEDICINE

## 2021-09-12 PROCEDURE — 94640 AIRWAY INHALATION TREATMENT: CPT

## 2021-09-12 PROCEDURE — 97530 THERAPEUTIC ACTIVITIES: CPT

## 2021-09-12 PROCEDURE — 2700000000 HC OXYGEN THERAPY PER DAY

## 2021-09-12 PROCEDURE — 2580000003 HC RX 258: Performed by: INTERNAL MEDICINE

## 2021-09-12 PROCEDURE — 80048 BASIC METABOLIC PNL TOTAL CA: CPT

## 2021-09-12 PROCEDURE — 83735 ASSAY OF MAGNESIUM: CPT

## 2021-09-12 PROCEDURE — 94761 N-INVAS EAR/PLS OXIMETRY MLT: CPT

## 2021-09-12 PROCEDURE — 83880 ASSAY OF NATRIURETIC PEPTIDE: CPT

## 2021-09-12 RX ORDER — FUROSEMIDE 40 MG/1
40 TABLET ORAL DAILY
Qty: 30 TABLET | Refills: 0 | Status: SHIPPED | OUTPATIENT
Start: 2021-09-12 | End: 2021-10-14 | Stop reason: SDUPTHER

## 2021-09-12 RX ORDER — NIFEDIPINE 60 MG/1
60 TABLET, EXTENDED RELEASE ORAL DAILY
Status: DISCONTINUED | OUTPATIENT
Start: 2021-09-12 | End: 2021-09-12 | Stop reason: HOSPADM

## 2021-09-12 RX ORDER — BUDESONIDE AND FORMOTEROL FUMARATE DIHYDRATE 80; 4.5 UG/1; UG/1
2 AEROSOL RESPIRATORY (INHALATION) 2 TIMES DAILY
Qty: 10.2 G | Refills: 0 | Status: SHIPPED | OUTPATIENT
Start: 2021-09-12 | End: 2022-01-13 | Stop reason: SDUPTHER

## 2021-09-12 RX ORDER — LISINOPRIL 10 MG/1
10 TABLET ORAL DAILY
Qty: 30 TABLET | Refills: 0 | Status: SHIPPED | OUTPATIENT
Start: 2021-09-12 | End: 2021-10-20 | Stop reason: SDUPTHER

## 2021-09-12 RX ADMIN — HYDRALAZINE HYDROCHLORIDE 50 MG: 50 TABLET, FILM COATED ORAL at 05:54

## 2021-09-12 RX ADMIN — BUDESONIDE AND FORMOTEROL FUMARATE DIHYDRATE 2 PUFF: 160; 4.5 AEROSOL RESPIRATORY (INHALATION) at 08:12

## 2021-09-12 RX ADMIN — ALBUTEROL SULFATE 2.5 MG: 2.5 SOLUTION RESPIRATORY (INHALATION) at 13:06

## 2021-09-12 RX ADMIN — ISOSORBIDE MONONITRATE 30 MG: 30 TABLET, EXTENDED RELEASE ORAL at 09:10

## 2021-09-12 RX ADMIN — SODIUM CHLORIDE, PRESERVATIVE FREE 10 ML: 5 INJECTION INTRAVENOUS at 09:11

## 2021-09-12 RX ADMIN — ROSUVASTATIN CALCIUM 20 MG: 20 TABLET, FILM COATED ORAL at 09:10

## 2021-09-12 RX ADMIN — ALBUTEROL SULFATE 2.5 MG: 2.5 SOLUTION RESPIRATORY (INHALATION) at 08:12

## 2021-09-12 RX ADMIN — RIVAROXABAN 20 MG: 20 TABLET, FILM COATED ORAL at 09:10

## 2021-09-12 RX ADMIN — NIFEDIPINE 60 MG: 60 TABLET, EXTENDED RELEASE ORAL at 11:33

## 2021-09-12 RX ADMIN — LISINOPRIL 10 MG: 10 TABLET ORAL at 11:32

## 2021-09-12 RX ADMIN — METOPROLOL SUCCINATE 50 MG: 50 TABLET, EXTENDED RELEASE ORAL at 09:10

## 2021-09-12 ASSESSMENT — PAIN SCALES - GENERAL: PAINLEVEL_OUTOF10: 0

## 2021-09-12 NOTE — PLAN OF CARE
Problem: Falls - Risk of:  Goal: Will remain free from falls  Description: Will remain free from falls  9/12/2021 1317 by Migue Sanchez RN  Outcome: Completed  9/12/2021 1141 by Migue Sanchez RN  Outcome: Ongoing  9/12/2021 0350 by Dawna Bridges RN  Outcome: Ongoing  Goal: Absence of physical injury  Description: Absence of physical injury  9/12/2021 1317 by Migue Sanchez RN  Outcome: Completed  9/12/2021 1141 by Migue Sanchez RN  Outcome: Ongoing  9/12/2021 0350 by Dawna Bridges RN  Outcome: Ongoing     Problem: Pain:  Description: Pain management should include both nonpharmacologic and pharmacologic interventions.   Goal: Pain level will decrease  Description: Pain level will decrease  9/12/2021 1317 by Migue Sanchez RN  Outcome: Completed  9/12/2021 1141 by Migue Sanchez RN  Outcome: Ongoing  9/12/2021 0350 by Dawna Bridges RN  Outcome: Ongoing  Goal: Control of acute pain  Description: Control of acute pain  9/12/2021 1317 by Migue Sanchez RN  Outcome: Completed  9/12/2021 1141 by Migue Sanchez RN  Outcome: Ongoing  9/12/2021 0350 by Dawna Bridges RN  Outcome: Ongoing  Goal: Control of chronic pain  Description: Control of chronic pain  9/12/2021 1317 by Migue Sanchez RN  Outcome: Completed  9/12/2021 1141 by Migue Sanchez RN  Outcome: Ongoing  9/12/2021 0350 by Dawna Bridges RN  Outcome: Ongoing     Problem: OXYGENATION/RESPIRATORY FUNCTION  Goal: Patient will maintain patent airway  9/12/2021 1317 by Migue Sanchez RN  Outcome: Completed  9/12/2021 1141 by Migue Sanchez RN  Outcome: Ongoing  9/12/2021 0350 by Dawna Bridges RN  Outcome: Ongoing  Goal: Patient will achieve/maintain normal respiratory rate/effort  Description: Respiratory rate and effort will be within normal limits for the patient  9/12/2021 1317 by Migue Sanchez RN  Outcome: Completed  9/12/2021 1141 by Migue Sanchez RN  Outcome: Ongoing  9/12/2021 0350 by Dawna Bridges RN  Outcome: Ongoing Problem: HEMODYNAMIC STATUS  Goal: Patient has stable vital signs and fluid balance  9/12/2021 1317 by Matilde Phelan RN  Outcome: Completed  9/12/2021 1141 by Matilde Phelan RN  Outcome: Ongoing  9/12/2021 0350 by Agapito Bullock RN  Outcome: Ongoing     Problem: FLUID AND ELECTROLYTE IMBALANCE  Goal: Fluid and electrolyte balance are achieved/maintained  9/12/2021 1317 by Matilde Phelan RN  Outcome: Completed  9/12/2021 1141 by Matilde Phelan RN  Outcome: Ongoing  9/12/2021 0350 by Agapito Bullock RN  Outcome: Ongoing     Problem: ACTIVITY INTOLERANCE/IMPAIRED MOBILITY  Goal: Mobility/activity is maintained at optimum level for patient  9/12/2021 1317 by Matilde Phelan RN  Outcome: Completed  9/12/2021 1141 by Matilde Phelan RN  Outcome: Ongoing  9/12/2021 0350 by Agapito Bullock RN  Outcome: Ongoing

## 2021-09-12 NOTE — PLAN OF CARE
Problem: Falls - Risk of:  Goal: Will remain free from falls  Description: Will remain free from falls  9/12/2021 0350 by Francisca Solano RN  Outcome: Ongoing  9/11/2021 1717 by Rey Donaldson RN  Outcome: Ongoing  Goal: Absence of physical injury  Description: Absence of physical injury  9/12/2021 0350 by Francisca Solano RN  Outcome: Ongoing  9/11/2021 1717 by Rey Donaldson RN  Outcome: Ongoing     Problem: Pain:  Goal: Pain level will decrease  Description: Pain level will decrease  9/12/2021 0350 by Francisca Solano RN  Outcome: Ongoing  9/11/2021 1717 by Rey Donaldson RN  Outcome: Ongoing  Goal: Control of acute pain  Description: Control of acute pain  9/12/2021 0350 by Francisca Solano RN  Outcome: Ongoing  9/11/2021 1717 by Rey Donaldson RN  Outcome: Ongoing  Goal: Control of chronic pain  Description: Control of chronic pain  9/12/2021 0350 by Francisca Solano RN  Outcome: Ongoing  9/11/2021 1717 by Rey Donaldson RN  Outcome: Ongoing     Problem: OXYGENATION/RESPIRATORY FUNCTION  Goal: Patient will maintain patent airway  9/12/2021 0350 by Francisca Solano RN  Outcome: Ongoing  9/11/2021 1717 by Rey Donaldson RN  Outcome: Ongoing  Goal: Patient will achieve/maintain normal respiratory rate/effort  Description: Respiratory rate and effort will be within normal limits for the patient  9/12/2021 0350 by Francisca Solano RN  Outcome: Ongoing  9/11/2021 1717 by Rey Donaldson RN  Outcome: Ongoing     Problem: HEMODYNAMIC STATUS  Goal: Patient has stable vital signs and fluid balance  9/12/2021 0350 by Francisca Solano RN  Outcome: Ongoing  9/11/2021 1717 by Rey Donaldson RN  Outcome: Ongoing     Problem: FLUID AND ELECTROLYTE IMBALANCE  Goal: Fluid and electrolyte balance are achieved/maintained  9/12/2021 0350 by Francisca Solano RN  Outcome: Ongoing  9/11/2021 1717 by Rey Donaldson RN  Outcome: Ongoing     Problem: ACTIVITY INTOLERANCE/IMPAIRED MOBILITY  Goal: Mobility/activity is maintained at optimum level for patient  9/12/2021 0350 by Callie Perez RN  Outcome: Ongoing  9/11/2021 1717 by Melinda Penny RN  Outcome: Ongoing

## 2021-09-12 NOTE — PROGRESS NOTES
Physical Therapy    Facility/Department: [address] MED SURG  Initial Assessment/Discharge Summary    NAME: Kerry Coles  : 1945  MRN: [id number]    Date of Service: 2021    Discharge Recommendations:  Home with assist PRN   PT Equipment Recommendations  Other: Pt has Rolling Tomma Osmany. Assessment   Body structures, Functions, Activity limitations: Decreased functional mobility   Assessment: 76 y/o female admit 2021 with CHF, Pleural Effusion, Elevated Troponin and LE Edema. PMH as noted including CAD, CHF, COPD, AAA/Repair, R TKR. PTA pt living with , independent with daily care and functional mobility. Pt reports adequate assist/support for d/c home, no further PT indicated at this time. Prognosis: Good  Decision Making: Low Complexity  History: 76 y/o female admit 2021 with CHF, Pleural Effusion, Elevated Troponin and LE Edema. PMH as noted including CAD, CHF, COPD, AAA/Repair, R TKR. Exam: See above. Clinical Presentation: See above. Patient Education: Role of PT, POC, Need to call for assist.  Barriers to Learning: None. REQUIRES PT FOLLOW UP: No  Activity Tolerance  Activity Tolerance: Patient Tolerated treatment well       Patient Diagnosis(es): The primary encounter diagnosis was Elevated troponin. Diagnoses of Acute on chronic congestive heart failure, unspecified heart failure type (HCC) and Pleural effusion on right were also pertinent to this visit. has a past medical history of AAA (abdominal aortic aneurysm) ([address] AAA (abdominal aortic aneurysm) without rupture (HCC), Atrial fibrillation ([address] CAD (coronary artery disease), CHF (congestive heart failure) ([address] COPD (chronic obstructive pulmonary disease) ([address] History of blood clots, Hyperlipidemia, and Hypertension. has a past surgical history that includes Colonoscopy (07); Hysterectomy ();  Appendectomy (); bladder suspension; Cataract removal; Tonsillectomy (as a child); tumor excision; Cholecystectomy (10/15/13); Colonoscopy (06/16/2017); joint replacement (12/2013); and Abdominal aortic aneurysm repair. Restrictions  Restrictions/Precautions  Restrictions/Precautions: Fall Risk  Vision/Hearing  Vision: Within Functional Limits  Hearing: Within functional limits     Subjective  General  Chart Reviewed: Yes  Patient assessed for rehabilitation services?: Yes  Additional Pertinent Hx: 76 y/o female admit 9/9/2021 with CHF, Pleural Effusion, Elevated Troponin and LE Edema. PMH as noted including CAD, CHF, COPD, AAA/Repair, R TKR. Family / Caregiver Present: Yes ( (Shirrel). )  Referring Practitioner: Dr. Сергей Villagomez  Diagnosis: CHF, Pleural Effusion, Elevated Troponin, LE Edema. Follows Commands: Within Functional Limits  Subjective  Subjective: Pt agreeable to PT Eval/Rx. Pain Screening  Patient Currently in Pain: Denies          Orientation  Orientation  Overall Orientation Status: Within Functional Limits (Alittle mild forgefullness.)  Social/Functional History  Social/Functional History  Lives With: Spouse  Type of Home: House  Home Layout: One level  Bathroom Shower/Tub:  (Walk-In Tub.)  Bathroom Toilet: Standard  Bathroom Equipment: Grab bars in shower  Bathroom Accessibility: Accessible  Home Equipment: Rolling walker, Cane  ADL Assistance: Independent  Homemaking Assistance:  (Shared with .)  Ambulation Assistance: Independent (Without assist device.)  Transfer Assistance: Independent  Active : No ( drives.)  Occupation: Retired  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 Additional Comments: Pt with adequate assist for d/c home. No recent falls.   Cognition   Cognition  Overall Cognitive Status: WFL (Alittle mild forgetful.)    Objective          AROM RLE (degrees)  RLE AROM: WFL  AROM LLE (degrees)  LLE AROM : WFL  AROM RUE (degrees)  RUE AROM : WFL  AROM LUE (degrees)  LUE AROM : WFL  Strength RLE  Strength RLE: WFL  Strength LLE  Strength LLE: WFL  Strength RUE  Strength RUE: WFL  Strength LUE  Strength LUE: WFL     Sensation  Overall Sensation Status:  (Pt denies any numb/tingling.)  Bed mobility  Supine to Sit: Independent  Sit to Supine: Independent  Transfers  Sit to Stand: Supervision  Stand to sit: Supervision  Ambulation  Ambulation?: Yes  Ambulation 1  Device: No Device  Distance: Pt amb without assist device 100' with SBA only. Diminished step length/clearance. Lat sway although no LOB. Plan   Plan  Times per week: D/C Acute Care PT Services. Safety Devices  Type of devices: Call light within reach, Left in chair, Nurse notified      Goals  Short term goals  Time Frame for Short term goals: No Acute Care PT Goals Identified. Patient Goals   Patient goals : Go home with .        Therapy Time   Individual Concurrent Group Co-treatment   Time In [address]         Time Out 1115         Minutes [place] [address] [facility]

## 2021-09-12 NOTE — DISCHARGE SUMMARY
Hospital Medicine Discharge Summary    Patient ID: Lucia Garner      Patient's PCP: Jayda Conde MD    Admit Date: 9/9/2021     Discharge Date:   09/12/2021    Admitting Physician: Samina Swann MD     Discharge Physician: Samina Swann MD     Discharge Diagnoses: Active Hospital Problems    Diagnosis     Coronary artery disease [I25.10]      Priority: High     Class: Chronic    Essential hypertension [I10]      Priority: Medium     Class: Chronic    Elevated troponin [R77.8]     Acute on chronic congestive heart failure (HCC) [I50.9]     PAF (paroxysmal atrial fibrillation) (HCC) [I48.0]     Acute on chronic diastolic heart failure (HCC) [I50.33]     Pleural effusion [J90]        The patient was seen and examined on day of discharge and this discharge summary is in conjunction with any daily progress note from day of discharge. Hospital Course:     From HPI:\"76 y.o. female who presented to [facility] with 3 to 4 days history of worsening shortness of breath, worse with any ambulation, better when she is sitting still no obvious trigger, in the context of chronic congestive heart failure, denies any recent change to her medication, associated with generalized weakness, paroxysmal nocturnal dyspnea, orthopnea and increased edema in both her lower extremity, denies nausea vomiting denies chest pain denies fever or chills. In emergency department found to have elevated BNP, pleural effusion and minimally elevated troponin, patient being admitted for further management and treatment\"      1.  Acute on chronic congestive heart failure likely diastolic, could be triggered by A. fib, no recent change to medication, IV Lasix, cardiology consulted, strict I&O, echo preserved EF, creatinine increased so IV Lasix was on hold, creatinine improved today will restart p.o.  Lasix upon discharge cardiology followed and okay to discharge patient, follow-up with cardiology as outpatient  2.  Chronic respiratory failure with hypoxia, on 3 L of oxygen at home  3.  Paroxysmal atrial fibrillation, on oral anticoagulation  4.  Essential hypertension, uncontrolled resumed p.o. medications, added lisinopril,  will restart home dose of calcium channel blocker  5.    Pleural effusion, likely due to heart failure, diuretics for now, no immediate indication for thoracentesis  6.  Generalized weakness, due to above  7.  Heterogeneous thyroid, found on CT scan, ultrasound ordered and noted without recommendation for follow-up.  Patient can follow-up with her primary care physician  8. History of COPD with chronic respiratory failure, will restart her on Symbicort for now follow-up with primary care patient for further evaluation. Physical Exam Performed:     /61   Pulse 65   Temp 97.8 °F (36.6 °C)   Resp 17   Ht 5' 4\" (1.626 m)   Wt 221 lb 9 oz (100.5 kg)   SpO2 96%   BMI 38.03 kg/m²       General appearance:  No apparent distress  HEENT:  Normal cephalic  Neck: Supple  Respiratory:  Normal respiratory effort. Clear to auscultation, bilaterally without Rales/Wheezes/Rhonchi. Cardiovascular:  Regular rate and rhythm with normal S1/S2 without murmurs, rubs or gallops. Abdomen: Soft, non-tender, non-distended   Musculoskeletal:  No clubbing  Skin: Skin color, texture, turgor normal.  No rashes or lesions. Neurologic: No focal weakness  Psychiatric:  Alert and oriented  Capillary Refill: Brisk,< 3 seconds   Peripheral Pulses: +2 palpable, equal bilaterally       Labs:  For convenience and continuity at follow-up the following most recent labs are provided:      CBC:    Lab Results   Component Value Date    WBC 11.2 09/09/2021    HGB 11.4 09/09/2021    HCT 35.2 09/09/2021     09/09/2021       Renal:    Lab Results   Component Value Date     09/12/2021    K 4.1 09/12/2021    K 4.2 09/09/2021    CL 96 09/12/2021    CO2 35 09/12/2021    BUN 22 09/12/2021    CREATININE 1.2 09/12/2021    CALCIUM 9.1 09/12/2021    PHOS 5.2 07/07/2021         Significant Diagnostic Studies    Radiology:   US THYROID   Preliminary Result   Heterogeneous thyroid. Benign thyroid nodules for which additional follow-up is not recommended. CT CHEST WO CONTRAST   Final Result   1. Small bilateral pleural effusions with mild dependent curvilinear and   consolidative opacities within both lower lobes, most likely passive   atelectasis, less likely aspiration or pneumonia. 2. Mild mediastinal lymphadenopathy, most likely benign and reactive in   etiology. Malignant lymphadenopathy is considered unlikely, though not   excluded. 3. Enlarged heterogeneous appearance of the thyroid gland. Recommend further   characterization with a routine non urgent thyroid ultrasound, as advised   below. 4. Findings suggestive of chronic pulmonary arterial hypertension. RECOMMENDATIONS:   Incidental heterogeneous and enlarged thyroid. Recommend thyroid US. Reference: J Am Kami Radiol. 2015 Feb;12(2): 143-50            XR CHEST (2 VW)   Final Result   Small right pleural effusion with adjacent atelectasis. Stable cardiomegaly.                 Consults:     IP CONSULT TO HOSPITALIST  IP CONSULT TO HEART FAILURE NURSE/COORDINATOR  IP CONSULT TO DIETITIAN  IP CONSULT TO CARDIOLOGY    Disposition:  HOME     Condition at Discharge: Stable    Discharge Instructions/Follow-up:  PCP, Cardiology     Code Status:  Full Code     Activity: activity as tolerated    Diet: cardiac diet      Discharge Medications:     Current Discharge Medication List           Details   lisinopril (PRINIVIL;ZESTRIL) 10 MG tablet Take 1 tablet by mouth daily  Qty: 30 tablet, Refills: 0      furosemide (LASIX) 40 MG tablet Take 1 tablet by mouth daily  Qty: 30 tablet, Refills: 0              Details   budesonide-formoterol (SYMBICORT) 80-4.5 MCG/ACT AERO Inhale 2 puffs into the lungs 2 times daily  Qty: 10.2 g, Refills: 0              Details   clobetasol (TEMOVATE) 0.05 % cream Apply topically 2 times daily as needed for Rash Apply to rash between genital area and buttocks and around anus bid prn x up to 2 weeks -need at least 1 week break prior to restarting      mupirocin (BACTROBAN) 2 % ointment Apply topically 3 times daily Apply to scabs three times daily for 10 days or until healed      triamcinolone (KENALOG) 0.1 % ointment Apply topically 2 times daily as needed for Rash Apply to rash between genital area and buttocks and around anus bid prn during breaks from clobetasol      traZODone (DESYREL) 50 MG tablet Take 1 tablet by mouth nightly as needed for Sleep  Qty: 30 tablet, Refills: 1      isosorbide mononitrate (IMDUR) 30 MG extended release tablet TAKE ONE TABLET BY MOUTH DAILY  Qty: 90 tablet, Refills: 4      albuterol (PROVENTIL) (2.5 MG/3ML) 0.083% nebulizer solution Take 3 mLs by nebulization every 4 hours as needed for Wheezing  Qty: 540 mL, Refills: 2    Associated Diagnoses: COPD, severe (HCC)      metoprolol succinate (TOPROL XL) 50 MG extended release tablet Take 50 mg by mouth daily      rivaroxaban (XARELTO) 20 MG TABS tablet TAKE ONE TABLET BY MOUTH DAILY WITH BREAKFAST  Qty: 30 tablet, Refills: 3      NIFEdipine (ADALAT CC) 60 MG extended release tablet TAKE ONE TABLET BY MOUTH DAILY  Qty: 90 tablet, Refills: 3      rosuvastatin (CRESTOR) 20 MG tablet Take 1 tablet by mouth daily  Qty: 30 tablet, Refills: 11      albuterol sulfate HFA (PROAIR HFA) 108 (90 Base) MCG/ACT inhaler Inhale 2 puffs into the lungs every 4 hours as needed for Wheezing or Shortness of Breath  Qty: 1 Inhaler, Refills: 11      nitroGLYCERIN (NITROSTAT) 0.4 MG SL tablet Place 1 tablet under the tongue every 5 minutes as needed for Chest pain  Qty: 25 tablet, Refills: 1      Multiple Vitamins-Minerals (MULTI FOR HER 50+ PO) Take 1 tablet by mouth daily             Time Spent on discharge is more than 45 minutes in the examination, evaluation, counseling and review of

## 2021-09-12 NOTE — PROGRESS NOTES
Patient alert and oriented x4, VSS, sitting up in chair eating breakfast. Patient denies n/v, diarrhea, pain, states mild SOB, O2 WNL. Patient denies needs at this time. Chair in locked position, non-slip socks on, call light within reach. Will continue to monitor pt needs.

## 2021-09-12 NOTE — PLAN OF CARE
Problem: Falls - Risk of:  Goal: Will remain free from falls  Description: Will remain free from falls  9/12/2021 1141 by Chava Stiles RN  Outcome: Ongoing  9/12/2021 0350 by Mario Carlton RN  Outcome: Ongoing  Goal: Absence of physical injury  Description: Absence of physical injury  9/12/2021 1141 by Chava Stiles RN  Outcome: Ongoing  9/12/2021 0350 by Mario Carlton RN  Outcome: Ongoing     Problem: Pain:  Description: Pain management should include both nonpharmacologic and pharmacologic interventions.   Goal: Pain level will decrease  Description: Pain level will decrease  9/12/2021 1141 by Chava Stiles RN  Outcome: Ongoing  9/12/2021 0350 by Mario Carlton RN  Outcome: Ongoing  Goal: Control of acute pain  Description: Control of acute pain  9/12/2021 1141 by Chava Stiles RN  Outcome: Ongoing  9/12/2021 0350 by Mario Carlton RN  Outcome: Ongoing  Goal: Control of chronic pain  Description: Control of chronic pain  9/12/2021 1141 by Chava Stiles RN  Outcome: Ongoing  9/12/2021 0350 by Mario Carlton RN  Outcome: Ongoing     Problem: OXYGENATION/RESPIRATORY FUNCTION  Goal: Patient will maintain patent airway  9/12/2021 1141 by Chava Stiles RN  Outcome: Ongoing  9/12/2021 0350 by Mario Carlton RN  Outcome: Ongoing  Goal: Patient will achieve/maintain normal respiratory rate/effort  Description: Respiratory rate and effort will be within normal limits for the patient  9/12/2021 1141 by Chava Stiles RN  Outcome: Ongoing  9/12/2021 0350 by Mario Carlton RN  Outcome: Ongoing     Problem: HEMODYNAMIC STATUS  Goal: Patient has stable vital signs and fluid balance  9/12/2021 1141 by Chava Stiles RN  Outcome: Ongoing  9/12/2021 0350 by Mario Carlton RN  Outcome: Ongoing     Problem: FLUID AND ELECTROLYTE IMBALANCE  Goal: Fluid and electrolyte balance are achieved/maintained  9/12/2021 1141 by Chava Stiles RN  Outcome: Ongoing  9/12/2021 0350 by Sabina Law Lyudmila Kelley RN  Outcome: Ongoing     Problem: ACTIVITY INTOLERANCE/IMPAIRED MOBILITY  Goal: Mobility/activity is maintained at optimum level for patient  9/12/2021 1141 by Migue Sanchez RN  Outcome: Ongoing  9/12/2021 0350 by Dwana Bridges RN  Outcome: Ongoing

## 2021-09-12 NOTE — DISCHARGE INSTR - DIET

## 2021-09-12 NOTE — PROGRESS NOTES
Patient stated her backside was itchy. This RN applied lotion to her back. Depends are wet - Pure wick was removed . Assisted patient to bathroom. Patient cleansed self with bath wipes. Pure Wick left off at this time as diapers were saturated. Patient is OFELIA, pleasant and cooperative.

## 2021-09-13 ENCOUNTER — FOLLOWUP TELEPHONE ENCOUNTER (OUTPATIENT)
Dept: INPATIENT UNIT | Age: 76
End: 2021-09-13

## 2021-09-14 ENCOUNTER — CARE COORDINATION (OUTPATIENT)
Dept: CASE MANAGEMENT | Age: 76
End: 2021-09-14

## 2021-09-14 DIAGNOSIS — R77.8 ELEVATED TROPONIN: Primary | ICD-10-CM

## 2021-09-14 PROCEDURE — 1111F DSCHRG MED/CURRENT MED MERGE: CPT | Performed by: FAMILY MEDICINE

## 2021-09-14 NOTE — CARE COORDINATION
symptoms and resources available to patient including: PCP and Specialist. The patient agrees to contact the PCP office for questions related to their healthcare. Medication reconciliation was performed with patient, who verbalizes understanding of administration of home medications. Advised obtaining a 90-day supply of all daily and as-needed medications. Covid Risk Education  Completed Joni Davison 3/18/21     Educated patient about risk for severe COVID-19 due to risk factors according to [facility] guidelines. LPN CC reviewed discharge instructions, medical action plan and red flag symptoms with the patient who verbalized understanding. Discussed COVID vaccination status: Yes. Education provided on COVID-19 vaccination as appropriate. Discussed exposure protocols and quarantine with CDC Guidelines. Patient was given an opportunity to verbalize any questions and concerns and agrees to contact LPN CC or health care provider for questions related to their healthcare. Reviewed and educated patient on any new and changed medications related to discharge diagnosis. Was patient discharged with a pulse oximeter? No Discussed and confirmed pulse oximeter discharge instructions and when to notify provider or seek emergency care. Patient verified  and was pleasant and agreeable to transition calls. States she is good. Denies CP, edema. States SOB and cough decreased. Weight 213 on home scale. Appetite good. Denies problems with bowels or bladder. Full medication reconciliation and 1111f completed. Plans to call PCP for f/u. Cardiology f/u , pulmonology . Denies any acute needs at present time. Agreeable to f/u calls. Educated on the use of urgent care or physicians 24 hr access line if assistance is needed after hours. LPN CC provided contact information. Plan for follow-up call in 5-7 days based on severity of symptoms and risk factors.   Martita Rodriguez[address]  [phone]    [facility] Transitions 24 Hour Call    Do you have any ongoing symptoms?: No  Do you have a copy of your discharge instructions?: Yes  Do you have all of your prescriptions and are they filled?: Yes  Have you been contacted by a [address]?: No  Have you scheduled your follow up appointment?: Yes  How are you going to get to your appointment?: Car - family or friend to transport  Were you discharged with any Home Care or Post Acute Services: No  Do you feel like you have everything you need to keep you well at home?: Yes  Care Transitions Interventions         Follow Up  Future Appointments   Date Time Provider Kiana Puri   9/16/2021  9:20 AM Kimberley Sharpe Daily, APRN - CNP [facility]   9/20/2021  1:00 PM Carri Addison DO [facility]   10/29/2021  1:00 PM Patel Montez MD [facility]   11/30/2021  2:00 [facility]   11/30/2021  2:45 PM Katherine Puga MD [facility]       Leena Rodriguez LPN

## 2021-09-16 ENCOUNTER — OFFICE VISIT (OUTPATIENT)
Dept: CARDIOLOGY CLINIC | Age: 76
End: 2021-09-16
Payer: MEDICARE

## 2021-09-16 VITALS
HEIGHT: 64 IN | SYSTOLIC BLOOD PRESSURE: 132 MMHG | OXYGEN SATURATION: 98 % | HEART RATE: 87 BPM | DIASTOLIC BLOOD PRESSURE: 64 MMHG | WEIGHT: 214.6 LBS | BODY MASS INDEX: 36.64 KG/M2

## 2021-09-16 DIAGNOSIS — I50.32 CHRONIC DIASTOLIC HEART FAILURE (HCC): Primary | ICD-10-CM

## 2021-09-16 PROCEDURE — G8427 DOCREV CUR MEDS BY ELIG CLIN: HCPCS | Performed by: NURSE PRACTITIONER

## 2021-09-16 PROCEDURE — 1111F DSCHRG MED/CURRENT MED MERGE: CPT | Performed by: NURSE PRACTITIONER

## 2021-09-16 PROCEDURE — 4040F PNEUMOC VAC/ADMIN/RCVD: CPT | Performed by: NURSE PRACTITIONER

## 2021-09-16 PROCEDURE — 3017F COLORECTAL CA SCREEN DOC REV: CPT | Performed by: NURSE PRACTITIONER

## 2021-09-16 PROCEDURE — 93000 ELECTROCARDIOGRAM COMPLETE: CPT | Performed by: NURSE PRACTITIONER

## 2021-09-16 PROCEDURE — 99214 OFFICE O/P EST MOD 30 MIN: CPT | Performed by: NURSE PRACTITIONER

## 2021-09-16 PROCEDURE — 1123F ACP DISCUSS/DSCN MKR DOCD: CPT | Performed by: NURSE PRACTITIONER

## 2021-09-16 PROCEDURE — G8417 CALC BMI ABV UP PARAM F/U: HCPCS | Performed by: NURSE PRACTITIONER

## 2021-09-16 PROCEDURE — 1090F PRES/ABSN URINE INCON ASSESS: CPT | Performed by: NURSE PRACTITIONER

## 2021-09-16 PROCEDURE — G8399 PT W/DXA RESULTS DOCUMENT: HCPCS | Performed by: NURSE PRACTITIONER

## 2021-09-16 PROCEDURE — 1036F TOBACCO NON-USER: CPT | Performed by: NURSE PRACTITIONER

## 2021-09-16 NOTE — PATIENT INSTRUCTIONS
Instructions:   1. Medications: Continue current medications  2. Labs:BNP, BMP  3. Follow up: 6 weeks  4. Daily weight: Call for increase 3 lbs/day or 5 lbs/week. 5. 2 gm sodium diet:  6. Fluid Restriction: 64 oz.   7.Referred to [employer] for Education:  Yes

## 2021-09-16 NOTE — PROGRESS NOTES
Disease Father     Hypertension Other        HomeMedications:  Prior to Admission medications    Medication Sig Start Date End Date Taking?  Authorizing Provider   lisinopril (PRINIVIL;ZESTRIL) 10 MG tablet Take 1 tablet by mouth daily 9/12/21  Yes Ward Davey MD   budesonide-formoterol (SYMBICORT) 80-4.5 MCG/ACT AERO Inhale 2 puffs into the lungs 2 times daily 9/12/21  Yes Ward Lindsay MD   furosemide (LASIX) 40 MG tablet Take 1 tablet by mouth daily 9/12/21  Yes Ward Davey MD   clobetasol (TEMOVATE) 0.05 % cream Apply topically 2 times daily as needed for Rash Apply to rash between genital area and buttocks and around anus bid prn x up to 2 weeks -need at least 1 week break prior to restarting 8/31/21 8/31/22 Yes Historical Provider, MD   mupirocin (BACTROBAN) 2 % ointment Apply topically 3 times daily Apply to scabs three times daily for 10 days or until healed 8/31/21 8/31/22 Yes Historical Provider, MD   triamcinolone (KENALOG) 0.1 % ointment Apply topically 2 times daily as needed for Rash Apply to rash between genital area and buttocks and around anus bid prn during breaks from clobetasol 8/31/21 8/31/22 Yes Historical Provider, MD   traZODone (DESYREL) 50 MG tablet Take 1 tablet by mouth nightly as needed for Sleep  Patient taking differently: Take 50 mg by mouth nightly  8/26/21  Yes Edna Abdullahi MD   isosorbide mononitrate (IMDUR) 30 MG extended release tablet TAKE ONE TABLET BY MOUTH DAILY  Patient taking differently: Take 30 mg by mouth daily  8/10/21  Yes Sam Ford MD   albuterol (PROVENTIL) (2.5 MG/3ML) 0.083% nebulizer solution Take 3 mLs by nebulization every 4 hours as needed for Wheezing 7/8/21  Yes Emily Park, APRN - CNP   metoprolol succinate (TOPROL XL) 50 MG extended release tablet Take 50 mg by mouth daily   Yes Historical Provider, MD   rivaroxaban (XARELTO) 20 MG TABS tablet TAKE ONE TABLET BY MOUTH DAILY WITH BREAKFAST  Patient taking differently: Take 20 mg by mouth daily (with breakfast) TAKE ONE TABLET BY MOUTH DAILY WITH BREAKFAST 6/14/21  Yes Barbara Huerta DO   NIFEdipine (ADALAT CC) 60 MG extended release tablet TAKE ONE TABLET BY MOUTH DAILY  Patient taking differently: Take 60 mg by mouth daily  3/2/21  Yes Prashanth Ardon MD   rosuvastatin (CRESTOR) 20 MG tablet Take 1 tablet by mouth daily 8/13/20  Yes Melissa Perrin MD   albuterol sulfate HFA (PROAIR HFA) 108 (90 Base) MCG/ACT inhaler Inhale 2 puffs into the lungs every 4 hours as needed for Wheezing or Shortness of Breath 7/23/20  Yes Barbara Huerta DO   nitroGLYCERIN (NITROSTAT) 0.4 MG SL tablet Place 1 tablet under the tongue every 5 minutes as needed for Chest pain 3/5/19  Yes Alisia Gomes MD   Multiple Vitamins-Minerals (MULTI FOR HER 50+ PO) Take 1 tablet by mouth daily   Yes Historical Provider, MD        Allergies:  Morphine and Other       LABS: Results reviewed with patient today.     CBC:   Lab Results   Component Value Date    WBC 11.2 09/09/2021    WBC 14.3 07/07/2021    WBC 8.6 07/05/2021    RBC 4.05 09/09/2021    RBC 3.97 07/07/2021    RBC 3.69 07/05/2021    RBC 4.19 04/13/2017    RBC 4.29 09/15/2016    RBC 4.23 07/14/2016    HGB 11.4 09/09/2021    HGB 11.0 07/07/2021    HGB 10.3 07/05/2021    HCT 35.2 09/09/2021    HCT 33.7 07/07/2021    HCT 31.2 07/05/2021    MCV 87.0 09/09/2021    MCV 84.9 07/07/2021    MCV 84.5 07/05/2021    RDW 17.3 09/09/2021    RDW 17.5 07/07/2021    RDW 17.1 07/05/2021     09/09/2021     07/07/2021     07/05/2021     BMP:  Lab Results   Component Value Date     09/12/2021     09/11/2021     09/10/2021    K 4.1 09/12/2021    K 3.9 09/11/2021    K 3.4 09/10/2021    K 4.2 09/09/2021    K 4.9 02/25/2020    K 4.1 03/22/2018    CL 96 09/12/2021    CL 94 09/11/2021    CL 98 09/10/2021    CO2 35 09/12/2021    CO2 33 09/11/2021    CO2 35 09/10/2021    PHOS 5.2 07/07/2021    PHOS 3.8 09/24/2020    PHOS 4.4 09/14/2020 BUN 22 09/12/2021    BUN 24 09/11/2021    BUN 14 09/10/2021    CREATININE 1.2 09/12/2021    CREATININE 1.4 09/11/2021    CREATININE 1.1 09/10/2021     BNP:   Lab Results   Component Value Date    PROBNP 1,641 09/12/2021    PROBNP 3,559 09/09/2021    PROBNP 2,128 02/08/2021       Parameters:   > 450 pg/mL under age 48  > 900 pg/mL ages 54-65  > 1800 pg/mL over age 76    Iron Studies:    Lab Results   Component Value Date    TIBC 275 07/14/2016    TIBC 268 03/15/2016    FERRITIN 140.8 07/14/2016     GLUCOSE: No results for input(s): GLUCOSE in the last 72 hours. LIVER PROFILE:   Lab Results   Component Value Date    AST 17 09/09/2021    ALT 12 09/09/2021    LIPASE 43.0 06/02/2015    AMYLASE 34 10/02/2013    LABALBU 4.1 09/09/2021    BILIDIR <0.2 07/14/2015    BILITOT 0.5 09/09/2021    ALKPHOS 92 09/09/2021     PT/INR:   Lab Results   Component Value Date    PROTIME 26.0 02/15/2020    INR 2.22 02/15/2020     Cardiac Enzymes:  Lab Results   Component Value Date    TROPONINI <0.01 09/09/2021     FASTING LIPID PANEL:  Lab Results   Component Value Date    CHOL 107 09/10/2021    HDL 56 09/10/2021    HDL 38 09/09/2011    LDLCALC 39 09/10/2021    TRIG 59 09/10/2021       Cardiac Imaging: Reports reviewed with patient today. EKG: Today, Sinus elisabeth HR 57      Echo 1/9/2018:  Mild concentric left ventricular hypertrophy. Visually estimated ejection fraction of 65%. Mitral annular calcification. Mild left atrial dilation. Mild aortic stenosis. (mean gradients 40TPNP)  The systolic pulmonary artery pressure (SPAP) estimated at 30 mmHg  (estimated RA pressure of 8 mmHg included). ECHO 1/2/20  There is moderate concentric left ventricular hypertrophy. Patient appears to be in atrial fibrillation. Ejection fraction is estimated to be 50-60%. Indeterminate diastolic function. Mild aortic regurgitation. Mild tricuspid regurgitation.   Mild mitral regurgitation with mitral annular calcification    Lexiscan 2/19/18   Summary    Abnormal study. There is a moderate sized, mild intensity, partially    reversible defect of the mid to apical anterior and mid anterolateral walls    which is consistent with ischemia. There is breast attenuation but stress    images appear worse.    Normal LV size and systolic function.    Overall findings represent an intermediate risk study         [facility]: ([facility]) 4/2017   of RCA chronic LAD 10-20% RA 4 PA24/9 16 wedge 9 LVEDP markedly elevated 30    Cardiac Cath 3/5/18  OVERALL IMPRESSION  1.  Again, 100% proximal right coronary artery occlusion with left to right  collaterals. 2.  Mild disease of the distal left main trunk. 3.  20% to 30% ostial left anterior descending artery stenosis with  collaterals from LAD to the right coronary artery. 4.  30% to 40% stenosis of the proximal circumflex artery. 5.  Normal left ventricular systolic function with estimated EF of 55% to  60%. 6.  Slightly enlarged aortic root with no evidence of aortic stenosis or  regurgitation.     In view of the above findings, we will okay the patient for her upcoming  aortic aneurysm surgery from cardiac standpoint. Right Heart Cath 2/28/2020  1.  _____ normal right cardiac pressure. 2.  Elevated pulmonary capillary wedge pressure with a mean pulmonary  capillary wedge of 29 mmHg. 3.  Normal cardiac output and indices. 4.  No oxygen step off to suggest ASD/PFO.    In view of the above findings, we will start the patient on a small dose  of diuretic therapy and see whether we need to adjust some of her  antihypertensive medicines or not.  Her findings are consistent with a  diastolic heart failure. Carotid US 10/2017 at [facility]:  1. Estimated diameter reduction of the right internal carotid artery is  less than 50%. 2.  Estimated diameter reduction of the left internal carotid artery is  50-69%. 3.  The bilateral common carotid arteries reveal no evidence of   significant stenosis.    4.  There is greater than 50% stenosis of the right external carotid  artery. 5.  There is no evidence of significant stenosis in the left external  carotid artery. 6.  The bilateral vertebral arteries are patent with antegrade flow. 7.  The bilateral subclavian arteries reveal no evidence of significant  stenosis. 8.  There has been no significant change from the previous study of  4/21/2017. Assessment/Plan:      1.) Ana on chronic diastolic heart failure, EF 50-60%: Appears euvolemic. SOB and activity level at baseline. No edema, abd distention, wt gain. Continue GDMT, monitor labs/renal fx/K. NYHA Class III   Stage C  Diuretic: lasix  Beta Blocker: Toprol XL  ACEi/ARB/ARNI:lisinopril  Aldosterone Antagonist: Not indicated for EF>35%  SGLT2 Inhibitor: Not indicated for EF>35%  2gm Na diet, daily weight, 64 oz fluid restriction  Avoid NSAIDS and other nephrotoxic meds  Cardiac Rehab: Not indicated for EF>35%  ICD:Not indicated for EF>35%  [facility] Referral: yes     2.) Paroxysmal Atrial Fib: Sinus elisabeth  [id number]     HASBLED-  Thromboembolic risk reduction: xarelto  Rate Control/ Rhythm Control: Toprol XL    3.) CAD: Stable  DAPT: xarelto  Beta Blocker: Toprol XL  ACEi/ARB:lisinopril  Anti anginal: imdur  Lipid management/high intensity statin: crestor  Risk factor management: high blood pressure, high cholesterol, Diabetes, smoking, obesity, family hx  Lifestyle modification: Heart healthy diet, regular exercise, weight loss, smoking cessation, stress reduction    Instructions:   1. Medications: Continue current medications  2. Labs:BNP, BMP  3. Follow up: 6 weeks  4. Daily weight: Call for increase 3 lbs/day or 5 lbs/week. 5. 2 gm sodium diet:  6. Fluid Restriction: 64 oz.   7.Referred to [employer] for Education:  Yes    I appreciate the opportunity of cooperating in the care of this individual.    SHARYN Groves, APRN - CNP, CNP, 9/16/2021,9:50 AM

## 2021-09-17 ENCOUNTER — OFFICE VISIT (OUTPATIENT)
Dept: FAMILY MEDICINE CLINIC | Age: 76
End: 2021-09-17
Payer: MEDICARE

## 2021-09-17 VITALS
HEART RATE: 76 BPM | DIASTOLIC BLOOD PRESSURE: 64 MMHG | SYSTOLIC BLOOD PRESSURE: 128 MMHG | HEIGHT: 64 IN | BODY MASS INDEX: 36.88 KG/M2 | WEIGHT: 216 LBS | TEMPERATURE: 99.3 F

## 2021-09-17 DIAGNOSIS — Z09 HOSPITAL DISCHARGE FOLLOW-UP: Primary | ICD-10-CM

## 2021-09-17 DIAGNOSIS — I48.0 PAF (PAROXYSMAL ATRIAL FIBRILLATION) (HCC): ICD-10-CM

## 2021-09-17 DIAGNOSIS — I50.23 ACUTE ON CHRONIC SYSTOLIC CONGESTIVE HEART FAILURE (HCC): ICD-10-CM

## 2021-09-17 DIAGNOSIS — I25.10 CORONARY ARTERY DISEASE INVOLVING NATIVE CORONARY ARTERY OF NATIVE HEART WITHOUT ANGINA PECTORIS: Chronic | ICD-10-CM

## 2021-09-17 PROCEDURE — 1090F PRES/ABSN URINE INCON ASSESS: CPT | Performed by: FAMILY MEDICINE

## 2021-09-17 PROCEDURE — 4040F PNEUMOC VAC/ADMIN/RCVD: CPT | Performed by: FAMILY MEDICINE

## 2021-09-17 PROCEDURE — G8417 CALC BMI ABV UP PARAM F/U: HCPCS | Performed by: FAMILY MEDICINE

## 2021-09-17 PROCEDURE — 1111F DSCHRG MED/CURRENT MED MERGE: CPT | Performed by: FAMILY MEDICINE

## 2021-09-17 PROCEDURE — G8427 DOCREV CUR MEDS BY ELIG CLIN: HCPCS | Performed by: FAMILY MEDICINE

## 2021-09-17 PROCEDURE — 1036F TOBACCO NON-USER: CPT | Performed by: FAMILY MEDICINE

## 2021-09-17 PROCEDURE — 99214 OFFICE O/P EST MOD 30 MIN: CPT | Performed by: FAMILY MEDICINE

## 2021-09-17 PROCEDURE — G8399 PT W/DXA RESULTS DOCUMENT: HCPCS | Performed by: FAMILY MEDICINE

## 2021-09-17 PROCEDURE — 1123F ACP DISCUSS/DSCN MKR DOCD: CPT | Performed by: FAMILY MEDICINE

## 2021-09-17 PROCEDURE — 3017F COLORECTAL CA SCREEN DOC REV: CPT | Performed by: FAMILY MEDICINE

## 2021-09-17 ASSESSMENT — ENCOUNTER SYMPTOMS
ABDOMINAL PAIN: 0
COUGH: 0
CONSTIPATION: 0
DIARRHEA: 0
BLOOD IN STOOL: 0

## 2021-09-17 NOTE — PROGRESS NOTES
Subjective:      Patient ID: Catracho Yuen is a 77 y.o. female. Chief Complaint   Patient presents with    Follow-Up from [facility] 9-9-2021         Patient presents with:   Follow-Up from Hospital: [facility] 9-9-2021     Here for the above  She was d/c on Sunday  She tells me she is better  She is here with her      YOB: 1945    Date of Visit:  9/17/2021     -- Morphine -- Rash    --  rash   -- Other -- Rash    Current Outpatient Medications:  lisinopril (PRINIVIL;ZESTRIL) 10 MG tablet, Take 1 tablet by mouth daily, Disp: 30 tablet, Rfl: 0  budesonide-formoterol (SYMBICORT) 80-4.5 MCG/ACT AERO, Inhale 2 puffs into the lungs 2 times daily, Disp: 10.2 g, Rfl: 0  furosemide (LASIX) 40 MG tablet, Take 1 tablet by mouth daily, Disp: 30 tablet, Rfl: 0  clobetasol (TEMOVATE) 0.05 % cream, Apply topically 2 times daily as needed for Rash Apply to rash between genital area and buttocks and around anus bid prn x up to 2 weeks -need at least 1 week break prior to restarting, Disp: , Rfl:   mupirocin (BACTROBAN) 2 % ointment, Apply topically 3 times daily Apply to scabs three times daily for 10 days or until healed, Disp: , Rfl:   triamcinolone (KENALOG) 0.1 % ointment, Apply topically 2 times daily as needed for Rash Apply to rash between genital area and buttocks and around anus bid prn during breaks from clobetasol, Disp: , Rfl:   traZODone (DESYREL) 50 MG tablet, Take 1 tablet by mouth nightly as needed for Sleep (Patient taking differently: Take 50 mg by mouth nightly ), Disp: 30 tablet, Rfl: 1  isosorbide mononitrate (IMDUR) 30 MG extended release tablet, TAKE ONE TABLET BY MOUTH DAILY (Patient taking differently: Take 30 mg by mouth daily ), Disp: 90 tablet, Rfl: 4  albuterol (PROVENTIL) (2.5 MG/3ML) 0.083% nebulizer solution, Take 3 mLs by nebulization every 4 hours as needed for Wheezing, Disp: 540 mL, Rfl: 2  metoprolol succinate (TOPROL XL) 50 MG extended release tablet, Take 50 mg by mouth daily, Disp: , Rfl:   rivaroxaban (XARELTO) 20 MG TABS tablet, TAKE ONE TABLET BY MOUTH DAILY WITH BREAKFAST (Patient taking differently: Take 20 mg by mouth daily (with breakfast) TAKE ONE TABLET BY MOUTH DAILY WITH BREAKFAST), Disp: 30 tablet, Rfl: 3  NIFEdipine (ADALAT CC) 60 MG extended release tablet, TAKE ONE TABLET BY MOUTH DAILY (Patient taking differently: Take 60 mg by mouth daily ), Disp: 90 tablet, Rfl: 3  rosuvastatin (CRESTOR) 20 MG tablet, Take 1 tablet by mouth daily, Disp: 30 tablet, Rfl: 11  albuterol sulfate HFA (PROAIR HFA) 108 (90 Base) MCG/ACT inhaler, Inhale 2 puffs into the lungs every 4 hours as needed for Wheezing or Shortness of Breath, Disp: 1 Inhaler, Rfl: 11  nitroGLYCERIN (NITROSTAT) 0.4 MG SL tablet, Place 1 tablet under the tongue every 5 minutes as needed for Chest pain, Disp: 25 tablet, Rfl: 1  Multiple Vitamins-Minerals (MULTI FOR HER 50+ PO), Take 1 tablet by mouth daily, Disp: , Rfl:     No current facility-administered medications for this visit.      ---------------------------               09/17/21                      1607         ---------------------------   BP:          128/64         Site:    Left Upper Arm     Position:     Sitting        Cuff Size:   Large Adult      Pulse:         76           Temp:   99.3 °F (37.4 °C)   TempSrc:    Temporal        Weight:  216 lb (98 kg)     Height:  5' 4\" (1.626 m)   ---------------------------  Body mass index is 37.08 kg/m². Wt Readings from Last 3 Encounters:  09/17/21 : 216 lb (98 kg)  09/16/21 : 214 lb 9.6 oz (97.3 kg)  09/11/21 : 221 lb 9 oz (100.5 kg)    BP Readings from Last 3 Encounters:  09/17/21 : 128/64  09/16/21 : 132/64  09/12/21 : (!) 149/67        Review of Systems   Constitutional: Negative for appetite change, chills, fever and unexpected weight change. Respiratory: Negative for cough.          Shortness of breath is better  She really only has it when she is up and about   Cardiovascular: Negative for chest pain and palpitations. Gastrointestinal: Negative for abdominal pain, blood in stool, constipation and diarrhea. Genitourinary: Negative for difficulty urinating. Objective:   Physical Exam  Constitutional:       General: She is not in acute distress. Appearance: Normal appearance. She is well-developed. She is not ill-appearing or diaphoretic. Cardiovascular:      Rate and Rhythm: Normal rate and regular rhythm. Heart sounds: Normal heart sounds. No murmur heard. No friction rub. No gallop. Comments: No edema in legs   Pulmonary:      Effort: Pulmonary effort is normal. No tachypnea, accessory muscle usage or respiratory distress. Breath sounds: Normal breath sounds. No decreased breath sounds, wheezing, rhonchi or rales. Lymphadenopathy:      Cervical: No cervical adenopathy. Upper Body:      Right upper body: No supraclavicular adenopathy. Left upper body: No supraclavicular adenopathy. Skin:     General: Skin is warm and dry. Coloration: Skin is not pale. Neurological:      General: No focal deficit present. Mental Status: She is alert. Assessment:        Diagnosis Orders   1. Hospital discharge follow-up     2. Acute on chronic systolic congestive heart failure ([address]     3. PAF (paroxysmal atrial fibrillation) ([address]     4.  Coronary artery disease involving native coronary artery of native heart without angina pectoris         She is stable and seem to be improved  She does not use a cpap as could never use it     She had a worsening of the heart failure and d/c summary reviewed from 9/12/21  The follow up thyroid scan was ok on 9/9/21  She declined the flu shot     Bmp was fine yesterday and nml   Lipid nml on 9/10/21  Cbc slight off on 9/9/21      Plan:      Continue the medicines  Do watch the diet  See me in about 4 months         Yasir Hawkins MD

## 2021-09-20 ENCOUNTER — OFFICE VISIT (OUTPATIENT)
Dept: PULMONOLOGY | Age: 76
End: 2021-09-20
Payer: MEDICARE

## 2021-09-20 VITALS
WEIGHT: 217.6 LBS | RESPIRATION RATE: 16 BRPM | TEMPERATURE: 96.9 F | OXYGEN SATURATION: 96 % | DIASTOLIC BLOOD PRESSURE: 64 MMHG | HEART RATE: 70 BPM | HEIGHT: 64 IN | SYSTOLIC BLOOD PRESSURE: 120 MMHG | BODY MASS INDEX: 37.15 KG/M2

## 2021-09-20 DIAGNOSIS — J44.9 COPD, SEVERE (HCC): Primary | ICD-10-CM

## 2021-09-20 PROCEDURE — 1090F PRES/ABSN URINE INCON ASSESS: CPT | Performed by: INTERNAL MEDICINE

## 2021-09-20 PROCEDURE — G8399 PT W/DXA RESULTS DOCUMENT: HCPCS | Performed by: INTERNAL MEDICINE

## 2021-09-20 PROCEDURE — 1111F DSCHRG MED/CURRENT MED MERGE: CPT | Performed by: INTERNAL MEDICINE

## 2021-09-20 PROCEDURE — 1036F TOBACCO NON-USER: CPT | Performed by: INTERNAL MEDICINE

## 2021-09-20 PROCEDURE — 4040F PNEUMOC VAC/ADMIN/RCVD: CPT | Performed by: INTERNAL MEDICINE

## 2021-09-20 PROCEDURE — 1123F ACP DISCUSS/DSCN MKR DOCD: CPT | Performed by: INTERNAL MEDICINE

## 2021-09-20 PROCEDURE — G8427 DOCREV CUR MEDS BY ELIG CLIN: HCPCS | Performed by: INTERNAL MEDICINE

## 2021-09-20 PROCEDURE — 3017F COLORECTAL CA SCREEN DOC REV: CPT | Performed by: INTERNAL MEDICINE

## 2021-09-20 PROCEDURE — 3023F SPIROM DOC REV: CPT | Performed by: INTERNAL MEDICINE

## 2021-09-20 PROCEDURE — G8926 SPIRO NO PERF OR DOC: HCPCS | Performed by: INTERNAL MEDICINE

## 2021-09-20 PROCEDURE — 99213 OFFICE O/P EST LOW 20 MIN: CPT | Performed by: INTERNAL MEDICINE

## 2021-09-20 PROCEDURE — G8417 CALC BMI ABV UP PARAM F/U: HCPCS | Performed by: INTERNAL MEDICINE

## 2021-09-20 ASSESSMENT — COPD QUESTIONNAIRES
QUESTION8_ENERGYLEVEL: 3
QUESTION4_WALKINCLINE: 5
QUESTION7_SLEEPQUALITY: 1
QUESTION6_LEAVINGHOUSE: 1
QUESTION5_HOMEACTIVITIES: 1
QUESTION1_COUGHFREQUENCY: 1
CAT_TOTALSCORE: 14
QUESTION3_CHESTTIGHTNESS: 1
QUESTION2_CHESTPHLEGM: 1

## 2021-09-20 NOTE — PROGRESS NOTES
Physician Progress Note      PATIENT:               Radha Evans  CSN #:                  [id number]  :                       1945  ADMIT DATE:       2021 10:57 AM  DISCH DATE:        2021 2:13 PM  RESPONDING  PROVIDER #:        Imelda Ovalles MD          QUERY TEXT:    Pt admitted with CHF . Pt noted to have increase in Cr after Lasix IV. If   possible, please document in the progress notes and discharge summary if you   are evaluating and/or treating any of the following: The medical record reflects the following:  Risk Factors: CHF - on Lasix IV  Clinical Indicators: Cr 1.1 on admission - after IV Lasix - Cr 1.4  Treatment: Lasix held, cardiology consult    Defined by Kidney Disease Improving Global Outcomes (KDIGO) clinical practice   guideline for acute kidney injury:?  -Increase in?SCr?by greater than or equal to 0.3 mg/dl within 48?hours;?or?  -Increase or decrease in?SCr?to greater than or equal to 1.5 times baseline,   which is known or presumed to have occurred within the prior 7?days;?or?  -Urine volume < 0.5ml/kg/h for 6 hours  Options provided:  -- Acute kidney failure  -- Acute kidney injury  -- Other - I will add my own diagnosis  -- Disagree - Not applicable / Not valid  -- Disagree - Clinically unable to determine / Unknown  -- Refer to Clinical Documentation Reviewer    PROVIDER RESPONSE TEXT:    This patient has an Acute kidney injury.     Query created by: Dustin Odom on 2021 7:07 AM      Electronically signed by:  Imelda Ovalles MD 2021 2:44 PM

## 2021-09-20 NOTE — PROGRESS NOTES
Chief complaint  This is a 77y.o. year old female  who comes to see me with a chief complaint of   Chief Complaint   Patient presents with    COPD     f/u COPD       HPI  Here with CC of COPD    Had an admission last week for 3 days. Appears that the primary diagnosis was CHF. CT Chest was completed with small effusions and her BNP was 3600. She said she is feeling so much better. Not sure how much weight she gained but diuresed about 9 lbs during her admission. Is feeling so much better now. Uses symbicort, spiriva and albuterol.   Tries to conserve her inhalers due to the donut [facility]     Current Outpatient Medications   Medication Sig Dispense Refill    lisinopril (PRINIVIL;ZESTRIL) 10 MG tablet Take 1 tablet by mouth daily 30 tablet 0    budesonide-formoterol (SYMBICORT) 80-4.5 MCG/ACT AERO Inhale 2 puffs into the lungs 2 times daily 10.2 g 0    furosemide (LASIX) 40 MG tablet Take 1 tablet by mouth daily 30 tablet 0    clobetasol (TEMOVATE) 0.05 % cream Apply topically 2 times daily as needed for Rash Apply to rash between genital area and buttocks and around anus bid prn x up to 2 weeks -need at least 1 week break prior to restarting      mupirocin (BACTROBAN) 2 % ointment Apply topically 3 times daily Apply to scabs three times daily for 10 days or until healed      triamcinolone (KENALOG) 0.1 % ointment Apply topically 2 times daily as needed for Rash Apply to rash between genital area and buttocks and around anus bid prn during breaks from clobetasol      traZODone (DESYREL) 50 MG tablet Take 1 tablet by mouth nightly as needed for Sleep (Patient taking differently: Take 50 mg by mouth nightly ) 30 tablet 1    isosorbide mononitrate (IMDUR) 30 MG extended release tablet TAKE ONE TABLET BY MOUTH DAILY (Patient taking differently: Take 30 mg by mouth daily ) 90 tablet 4    albuterol (PROVENTIL) (2.5 MG/3ML) 0.083% nebulizer solution Take 3 mLs by nebulization every 4 hours as needed for Wheezing 540 mL 2    metoprolol succinate (TOPROL XL) 50 MG extended release tablet Take 50 mg by mouth daily      rivaroxaban (XARELTO) 20 MG TABS tablet TAKE ONE TABLET BY MOUTH DAILY WITH BREAKFAST (Patient taking differently: Take 20 mg by mouth daily (with breakfast) TAKE ONE TABLET BY MOUTH DAILY WITH BREAKFAST) 30 tablet 3    NIFEdipine (ADALAT CC) 60 MG extended release tablet TAKE ONE TABLET BY MOUTH DAILY (Patient taking differently: Take 60 mg by mouth daily ) 90 tablet 3    rosuvastatin (CRESTOR) 20 MG tablet Take 1 tablet by mouth daily 30 tablet 11    albuterol sulfate HFA (PROAIR HFA) 108 (90 Base) MCG/ACT inhaler Inhale 2 puffs into the lungs every 4 hours as needed for Wheezing or Shortness of Breath 1 Inhaler 11    nitroGLYCERIN (NITROSTAT) 0.4 MG SL tablet Place 1 tablet under the tongue every 5 minutes as needed for Chest pain 25 tablet 1    Multiple Vitamins-Minerals (MULTI FOR HER 50+ PO) Take 1 tablet by mouth daily       No current facility-administered medications for this visit. PHYSICAL EXAM:  Vitals:    09/20/21 1251   BP: 120/64   Pulse: 70   Resp: 16   Temp: 96.9 °F (36.1 °C)   SpO2: 96%       PE  GENERAL:  Well nourished, alert  HEENT:  No scleral icterus, no conjunctival irritation  NECK:  No thyromegaly, no bruits  LYMPH:  No cervical or supraclavicular adenopathy  HEART:  Regular rate and rhythm, no murmurs. Distant heart sounds   LUNGS:  Diminished but clear   ABDOMEN:  No distention, no organomegaly  EXTREMITIES:  trace edema, no digital clubbing  NEURO:  No localizing deficits, CN II-XII intact    Pulmonary Function Testing 7/2015  Severe obstruction with no response to bronchodilators    Moderate Restriction    Moderate Reduction in diffusing capacity     Chest imaging from 9/21 is reviewed. My impression is small effusion, mild adenopathy, scattered emphysema    ECHO 9/21  Mod conc. LVH with normal LV size & wall motion. EF is    60%.  Diastolic   filling parameters suggest grade II diastolic dysfunction. The left atrium is severely dilated. Normal right ventricular size and function. Trivial mitral, aortic and tricuspid regurgitation. 6 MWT 9/2020  The patient ambulated 122 meters which is abnormal- 34-%   predicted.  Her, heart rate response was normal, the blood   pressure response was normal, oxygen saturations were normal.     The patient desaturated to 93% while ambulating on room air.     The degree of symptoms based on the Danielle Dyspnea/Fatigue scale   were increased with testing.  This test does not indicate the   need for supplemental oxygen. I reviewed the above labs and images  \COPD Assessment Test    1. I never cough vs I cough all the time:  1  2. I have no phlegm vs I am completely full of phlegm. 1  3. My chest does not feel tight vs my chest feel vs tight. 1  4. Not breathless with inclines vs breathless with inclines. 5  5. Not limited with ADLs vs Very limited with ADLs. 1  6. Confidence leaving home vs no confidence. 1  7. I sleep soundly vs I don't sleep soundly. 1  8. I have lots of energy vs I have no energy. 3    TOTAL POINTS:  14    Scoring:    A) >30. Very high impact on quality of life. Optimize therapy including rehab  B) >20. High impact on quality of life. C) 10-20. Medium impact on qualify of life  D) <10. Low impact on life  E)  5.  Upper limit of normal in health non-smoker      Assessment/Plan:  1. COPD, severe ([address]  Seems her most recent flare up of breathing was related to her fluid retention/CHF. She diuresed well and is not much better. Continue with inhalers as is. Stay active. Watch salt and weights.         Flu shot and COVID up to date     Pulmonary Rehab:  [facility] [address] declined    Lung Cancer Screening CT:  9/2021    Follow up in 4-6 months

## 2021-09-21 ENCOUNTER — CARE COORDINATION (OUTPATIENT)
Dept: CASE MANAGEMENT | Age: 76
End: 2021-09-21

## 2021-09-21 NOTE — CARE COORDINATION
[facility] Transitions Follow Up Call    2021    Patient: Lolly   Patient : 1945   MRN: <[id number]>  Reason for Admission:SOB, CHF  Discharge Date: 21 RARS: Readmission Risk Score: 19    Spoke with: Lolly  who states that she is doing ok. Patient states that is taking all medications as ordered. Patient denies and sob(baseline COPD), cp, or edema. Patient reports weigh today 214 down from 217 yesterday. Patient reports following low sodium diet and fluid restrictions. Patient denies cough, dizziness, headache, n/v, diarrhea, abdominal pains, fever, or chills. Patient report that appetite and fluid intake is good and denies any problems with bowel or bladder. Denies any acute needs at present time. Patient instructed to continue to monitor for any of the above s/s, reporting any that may present to MD immediately. Reminded of COVID 19 precautions. Agreeable to f/u calls. Educated on the use of urgent care or physicians 24 hr access line if assistance is needed after hours    Care Transitions Subsequent and Final Call    Subsequent and Final Calls  Care Transitions Interventions  Other Interventions:            Follow Up  Future Appointments   Date Time Provider Kiana Puri   10/29/2021  1:00 PM [facility]   2021  2:00 PM SCHEDULE, VASCULAR LAB 1 [facility]   2021  2:45 PM MD ANNITA Payne [facility]   2022  2:20 PM Yoli Herman DO [facility] PULLAURO Pandya LPN

## 2021-09-28 ENCOUNTER — TELEPHONE (OUTPATIENT)
Dept: FAMILY MEDICINE CLINIC | Age: 76
End: 2021-09-28

## 2021-09-28 NOTE — TELEPHONE ENCOUNTER
Patient has nasal congestion, blowing clear mucus, no cough and no fever. She is asking if something can be called into [facility].       Please call, [phone]

## 2021-09-29 ENCOUNTER — CARE COORDINATION (OUTPATIENT)
Dept: CASE MANAGEMENT | Age: 76
End: 2021-09-29

## 2021-09-29 RX ORDER — ROSUVASTATIN CALCIUM 20 MG/1
TABLET, COATED ORAL
Qty: 90 TABLET | Refills: 0 | Status: SHIPPED | OUTPATIENT
Start: 2021-09-29 | End: 2022-01-25 | Stop reason: SDUPTHER

## 2021-09-29 NOTE — CARE COORDINATION
[facility] Transitions Follow Up Call    2021    Patient: Nya Caba  Patient : 1945   MRN: <[id number]>  Reason for Admission: SOB, CHF  Discharge Date: 21 RARS: Readmission Risk Score: 23    Spoke with: Nya Caba who reports that everything is good. Patient denies any cp, sob, dizziness, or edema. Patient Wt 212 lb about the same as yesterday. Working really hard to follow low sodium diet and fluid restrictions. Patient states that she is taking all medications as ordered. Patient report that appetite and fluid intake is good and denies any problems with bowel or bladder. Reminded of COVID 19 precautions. Agreeable to f/u calls. Care Transitions Subsequent and Final Call    Subsequent and Final Calls  Care Transitions Interventions  Other Interventions:            Follow Up  Future Appointments   Date Time Provider Kiana uPri   10/29/2021  1:00 PM Rupinder Rivera MD [facility]   2021  2:00 PM SCHEDULE, VASCULAR LAB 1 [facility]   2021  2:45 PM Alma Schilling MD [facility]   2022  2:20 PM Chrissy Lemons [facility]       Norberto De La O LPN

## 2021-10-11 ENCOUNTER — TELEPHONE (OUTPATIENT)
Dept: PHARMACY | Age: 76
End: 2021-10-11

## 2021-10-11 NOTE — TELEPHONE ENCOUNTER
Left message for return call to outpatient wellness center. Attempting to schedule patient for heart failure visit.

## 2021-10-13 ENCOUNTER — CARE COORDINATION (OUTPATIENT)
Dept: CASE MANAGEMENT | Age: 76
End: 2021-10-13

## 2021-10-13 NOTE — CARE COORDINATION
[facility] Transitions Follow Up Call    10/13/2021    Patient: Esaw Forward  Patient : 1945   MRN: [id number]  Reason for Admission: SOB, CHF  Discharge Date: 21 RARS: Readmission Risk Score: 19         Spoke with: Teresa Matteo Gayle Transitions Follow Up Call    Needs to be reviewed by the provider   Additional needs identified to be addressed with provider: No  none             Method of communication with provider : phone      Care Transition Nurse (CTN) contacted the patient by telephone to follow up after admission. Verified name and  with patient as identifiers. Addressed changes since last contact: none  Discussed follow-up appointments. If no appointment was previously scheduled, appointment scheduling offered: Yes. Is follow up appointment scheduled within 7 days of discharge? Yes. Advance Care Planning:   Does patient have an Advance Directive: reviewed and current. Advance Care Planning   Healthcare Decision Maker:    Primary Decision Maker: Domi Hooper Spouse - [phone]    Secondary Decision Maker: Ervin Mullen Child - [phone]    CTN reviewed discharge instructions, medical action plan and red flags with patient and discussed any barriers to care and/or understanding of plan of care after discharge. Discussed appropriate site of care based on symptoms and resources available to patient including: PCP and Specialist. The patient agrees to contact the PCP office for questions related to their healthcare. Patients top risk factors for readmission: medical condition-SOB, CHF    Patient verified  and was pleasant and agreeable to transition calls. States she is pretty good. Denies CP. Reports some SOB, but feels it is related to COPD. Denies cough. Denies edema. Weight 209 this AM on home scale. Working hard to reduce sodium and follow fluid restrictions. Appetite good. Reports sl constipation, taking stool softeners. LPN CC encouraged ambulation. Denies medication changes. Cardiology f/u 10/29. Denies any acute needs at present time. Agreeable to f/u calls. Educated on the use of urgent care or physicians 24 hr access line if assistance is needed after hours. CTN provided contact information for future needs. No further follow-up call indicated based on severity of symptoms and risk factors. Episode ended d/t end of transition period. Fco Ugalde  [facility]  Care Transitions  [phone]    Care Transitions Subsequent and Final Call    Subsequent and Final Calls  Do you currently have any active services?: No  Care Transitions Interventions  Other Interventions:            Follow Up  Future Appointments   Date Time Provider Kiana Puri   10/29/2021  1:00 PM MD Andre [facility]   11/30/2021  2:00 PM [facility]   11/30/2021  2:45 PM [facility]imentel [facility]   2/1/2022  2:20 PM Malvin Burton [facility]       Fco Ugalde LPN

## 2021-10-14 ENCOUNTER — TELEPHONE (OUTPATIENT)
Dept: FAMILY MEDICINE CLINIC | Age: 76
End: 2021-10-14

## 2021-10-14 RX ORDER — FUROSEMIDE 40 MG/1
40 TABLET ORAL DAILY
Qty: 90 TABLET | Refills: 1 | Status: SHIPPED | OUTPATIENT
Start: 2021-10-14 | End: 2022-02-04 | Stop reason: SDUPTHER

## 2021-10-18 ENCOUNTER — TELEPHONE (OUTPATIENT)
Dept: PULMONOLOGY | Age: 76
End: 2021-10-18

## 2021-10-19 ENCOUNTER — TELEPHONE (OUTPATIENT)
Dept: PULMONOLOGY | Age: 76
End: 2021-10-19

## 2021-10-20 ENCOUNTER — VIRTUAL VISIT (OUTPATIENT)
Dept: PHARMACY | Age: 76
End: 2021-10-20
Payer: MEDICARE

## 2021-10-20 DIAGNOSIS — J44.9 COPD, SEVERE (HCC): Primary | ICD-10-CM

## 2021-10-20 DIAGNOSIS — I50.33 ACUTE ON CHRONIC DIASTOLIC HEART FAILURE (HCC): ICD-10-CM

## 2021-10-20 PROCEDURE — 99213 OFFICE O/P EST LOW 20 MIN: CPT | Performed by: NURSE PRACTITIONER

## 2021-10-20 RX ORDER — LISINOPRIL 10 MG/1
10 TABLET ORAL DAILY
Qty: 30 TABLET | Refills: 2 | Status: SHIPPED | OUTPATIENT
Start: 2021-10-20 | End: 2021-10-29

## 2021-10-20 NOTE — PROGRESS NOTES
then     Immunization History   Administered Date(s) Administered    COVID-19, Pfizer, PF, 30mcg/0.3mL 02/25/2021, 03/18/2021, 10/20/2021    Influenza Vaccine, unspecified formulation 01/31/2018    Influenza, Quadv, IM, PF (6 mo and older Fluzone, Flulaval, Fluarix, and 3 yrs and older Afluria) 10/08/2020    Pneumococcal Conjugate 13-valent ([id number]) 02/16/2017    Pneumococcal Polysaccharide ([id number]) 06/30/2015     Allergies   Allergen Reactions    Morphine Rash     rash    Other Rash        ECHO EF%: 60%  Date: 9/9/2021      Last Hospitalization: 9/9/2021    History of DENISE: Yes but not using CPAP. States she \"could not use it\"  []BIPAP/CPAP use:   []Education about proper cleaning completed today     Subjective   HPI: Ms. Betty Hastings is a 77 y.o. female being evaluated by a virtual visit via telephone encounter from patient's home due to [facility] public health emergency. Due to the circumstances physical examination is very limited. This visit was conducted with the patient's consent for billing of her insurance. A telephone visit was necessary today to reduce the patient's exposure to COVID-19 and to provide necessary medical care. This patient has been advised to contact this office, her cardiologist, or primary care physician if symptoms develop or to call 911 for emergency medical treatment if deemed necessary. She has a medical history significant for chronic diastolic heart failure, HTN, severe COPD, DENISE, CKD 3, AAA, CAD/HLD, DVT/PE, PVD, and A. fib. She has been working on weight loss and reports a daily weight of 209 pounds today. Tells me that she feels \"good\". Has no complaints today. Has a history of sleep apnea but not using her CPAP. Tells me that she could not sleep with it. We discussed the importance of managing sleep apnea to improve cardiovascular health. She denies edema, fatigue, CP, palpitations, orthopnea, or abdominal fullness.   Has some shortness of breath with exertion Dyspnea at rest  []   Dyspnea while sleeping    Patient Findings:   []  Missed doses  []  Diet changes  []  Sodium intake changes    []  Alcohol intake changes  []  Night time cough  []  Edema    []  Activity changes   []  Fatigue that limits activity []  Acute illness  []  Early saiety, abd fullness []  Chest pain   []  Other        Objective: There were no vitals taken for this visit. BP Readings from Last 3 Encounters:   10/29/21 (!) 142/70   09/20/21 120/64   09/17/21 128/64     Wt Readings from Last 6 Encounters:   10/29/21 216 lb (98 kg)   09/20/21 217 lb 9.6 oz (98.7 kg)   09/17/21 216 lb (98 kg)   09/16/21 214 lb 9.6 oz (97.3 kg)   09/11/21 221 lb 9 oz (100.5 kg)   08/23/21 222 lb (100.7 kg)     Physical Exam:   Constitutional: Oriented to person, place, and time. No distress detected. Psychiatric: Normal mood and affect.      BMP:   Lab Results   Component Value Date     09/16/2021    K 5.1 09/16/2021    K 4.2 09/09/2021    CL 99 09/16/2021    CO2 29 09/16/2021    BUN 20 09/16/2021    CREATININE 1.2 09/16/2021       CBC:    Lab Results   Component Value Date    WBC 11.2 09/09/2021    HGB 11.4 09/09/2021    HCT 35.2 09/09/2021     09/09/2021     HgA1C:   Lab Results   Component Value Date    LABA1C 5.6 05/23/2019    LABA1C 5.9 05/25/2016    LABA1C 5.8 08/08/2014     TSH:   Lab Results   Component Value Date    TSH 0.66 12/05/2019     Lipids:   Lab Results   Component Value Date    CHOL 107 09/10/2021    TRIG 59 09/10/2021    HDL 56 09/10/2021    HDL 38 09/09/2011    LDLCALC 39 09/10/2021     ProBNP:   Lab Results   Component Value Date    PROBNP 1,051 09/16/2021    PROBNP 1,641 09/12/2021    PROBNP 3,559 09/09/2021       [x]Reviewed daily weight log and assessed self management skills including early recognition and notification of exacerbation   [x]Encouraged daily weights and to call with increase of 3 pounds in one day or 5 pounds in one week or weight increased above dry weight [x]Discussed fluid restriction and sodium restriction as well as nutrition goals   []Weight loss counseling performed, including carbohydrate and calorie reduction  []Exercise Counseling performed      Medications reviewed:   [x] compared patient's bottles with EPIC list  [] patient did not bring medication bottles      Current medications:  Outpatient Medications Marked as Taking for the 10/20/21 encounter (Virtual Visit) with CHRISTINE Wilson - CNP   Medication Sig Dispense Refill    rivaroxaban (XARELTO) 20 MG TABS tablet TAKE ONE TABLET BY MOUTH DAILY WITH BREAKFAST 30 tablet 11    [DISCONTINUED] lisinopril (PRINIVIL;ZESTRIL) 10 MG tablet Take 1 tablet by mouth daily 30 tablet 2    furosemide (LASIX) 40 MG tablet Take 1 tablet by mouth daily 90 tablet 1    rosuvastatin (CRESTOR) 20 MG tablet TAKE ONE TABLET BY MOUTH DAILY 90 tablet 0    budesonide-formoterol (SYMBICORT) 80-4.5 MCG/ACT AERO Inhale 2 puffs into the lungs 2 times daily 10.2 g 0    clobetasol (TEMOVATE) 0.05 % cream Apply topically 2 times daily as needed for Rash Apply to rash between genital area and buttocks and around anus bid prn x up to 2 weeks -need at least 1 week break prior to restarting      mupirocin (BACTROBAN) 2 % ointment Apply topically 3 times daily Apply to scabs three times daily for 10 days or until healed      triamcinolone (KENALOG) 0.1 % ointment Apply topically 2 times daily as needed for Rash Apply to rash between genital area and buttocks and around anus bid prn during breaks from clobetasol      [DISCONTINUED] traZODone (DESYREL) 50 MG tablet Take 1 tablet by mouth nightly as needed for Sleep (Patient taking differently: Take 50 mg by mouth nightly ) 30 tablet 1    isosorbide mononitrate (IMDUR) 30 MG extended release tablet TAKE ONE TABLET BY MOUTH DAILY (Patient taking differently: Take 30 mg by mouth daily ) 90 tablet 4    albuterol (PROVENTIL) (2.5 MG/3ML) 0.083% nebulizer solution Take 3 mLs by nebulization every 4 hours as needed for Wheezing 540 mL 2    metoprolol succinate (TOPROL XL) 50 MG extended release tablet Take 50 mg by mouth daily      NIFEdipine (ADALAT CC) 60 MG extended release tablet TAKE ONE TABLET BY MOUTH DAILY (Patient taking differently: Take 60 mg by mouth daily ) 90 tablet 3    albuterol sulfate HFA (PROAIR HFA) 108 (90 Base) MCG/ACT inhaler Inhale 2 puffs into the lungs every 4 hours as needed for Wheezing or Shortness of Breath 1 Inhaler 11    nitroGLYCERIN (NITROSTAT) 0.4 MG SL tablet Place 1 tablet under the tongue every 5 minutes as needed for Chest pain 25 tablet 1    Multiple Vitamins-Minerals (MULTI FOR HER 50+ PO) Take 1 tablet by mouth daily         [x]Reviewed heart failure medications including how they work and potential side effects. [x]Advised patient to avoid NSAIDs. Get With The Guidelines  Ms. Hali Wiggins is on a Beta-Blocker for (HFrEF) (systolic) EF </= 23%  Yes    Ms. Hali Wiggins is on an Ace-Inhibitor / ARB / Entresto for (HFrEF) (systolic) EF </= 97% Yes      Ms. Hali Wiggins is on a Aldosterone Receptor Antagonist for (HFrEF) (systolic) EF </= 88% or EF </= 40% with MI (Okay to use if SCr </= 2.5mg/dL in men, SCr </= 2mg/dL in women; Potassium < 5.0meq/L) No      Ms. Hali Wiggins is on a Diuretic Yes    If the above guidelines are not met, reason documented above or recommendations made: Yes. [x] Advanced Directives completed and scanned at a prior encounter  [] Advanced Directives need to be addressed      Barriers to Adherence: Verbalizes interest, Seeking additional information (pamphlets, classes, etc) and Active attentive participant    Environmental Concerns: not applicable    Readiness to Change  maintenance - has made change and is trying and/or practicing different alternative behaviors     Elania Wu is a 77 y.o. female evaluated via telephone on 10/20/2021.       Consent:  She and/or health care decision maker is aware that that she may receive a bill for this telephone service, depending on her insurance coverage, and has provided verbal consent to proceed: Yes      Documentation:  I communicated with the patient and/or health care decision maker about her chronic health conditions. Details of this discussion including any medical advice provided: See visit note      I affirm this is a Patient Initiated Episode with a Patient who has not had a related appointment within my department in the past 7 days or scheduled within the next 24 hours. Patient identification was verified at the start of the visit: Yes      Note: not billable if this call serves to triage the patient into an appointment for the relevant concern      CHRISTINE NEFF CNP      A heart failure binder has been provided prior to discharge in addition to extensive education including the information noted above. Assessment/Plan     Problem List Items Addressed This Visit     Acute on chronic diastolic heart failure (HCC)     No symptoms of acute exacerbation today. Continue daily weight, sodium and fluid restriction. Emergency action plan reviewed today with patient. Continue management and follow-up with cardiology as scheduled. COPD, severe ([address] - Primary     No symptoms of acute exacerbation today. Continue current medication regimen. Action plan reviewed today with patient. Continue management and follow-up with pulmonology. No orders of the defined types were placed in this encounter. Follow up with wellness center: No follow-up necessary at this time  Time spent in visit today including counseling and education: 45 minutes. *This note was transcribed using [email or web address]. Please disregard any translational errors.          Electronically signed by CHRISTINE Avila CNP, on 11/2/2021 at 2:06 PM

## 2021-10-29 ENCOUNTER — OFFICE VISIT (OUTPATIENT)
Dept: CARDIOLOGY CLINIC | Age: 76
End: 2021-10-29
Payer: MEDICARE

## 2021-10-29 VITALS
HEART RATE: 82 BPM | DIASTOLIC BLOOD PRESSURE: 70 MMHG | SYSTOLIC BLOOD PRESSURE: 142 MMHG | WEIGHT: 216 LBS | HEIGHT: 64 IN | BODY MASS INDEX: 36.88 KG/M2

## 2021-10-29 DIAGNOSIS — R06.09 DOE (DYSPNEA ON EXERTION): ICD-10-CM

## 2021-10-29 DIAGNOSIS — I50.33 ACUTE ON CHRONIC DIASTOLIC HEART FAILURE (HCC): ICD-10-CM

## 2021-10-29 DIAGNOSIS — I48.91 ATRIAL FIBRILLATION WITH RVR (HCC): Primary | ICD-10-CM

## 2021-10-29 PROCEDURE — G8417 CALC BMI ABV UP PARAM F/U: HCPCS | Performed by: INTERNAL MEDICINE

## 2021-10-29 PROCEDURE — 3017F COLORECTAL CA SCREEN DOC REV: CPT | Performed by: INTERNAL MEDICINE

## 2021-10-29 PROCEDURE — G8484 FLU IMMUNIZE NO ADMIN: HCPCS | Performed by: INTERNAL MEDICINE

## 2021-10-29 PROCEDURE — 1090F PRES/ABSN URINE INCON ASSESS: CPT | Performed by: INTERNAL MEDICINE

## 2021-10-29 PROCEDURE — 1123F ACP DISCUSS/DSCN MKR DOCD: CPT | Performed by: INTERNAL MEDICINE

## 2021-10-29 PROCEDURE — 1036F TOBACCO NON-USER: CPT | Performed by: INTERNAL MEDICINE

## 2021-10-29 PROCEDURE — 4040F PNEUMOC VAC/ADMIN/RCVD: CPT | Performed by: INTERNAL MEDICINE

## 2021-10-29 PROCEDURE — 99214 OFFICE O/P EST MOD 30 MIN: CPT | Performed by: INTERNAL MEDICINE

## 2021-10-29 PROCEDURE — G8427 DOCREV CUR MEDS BY ELIG CLIN: HCPCS | Performed by: INTERNAL MEDICINE

## 2021-10-29 PROCEDURE — 93000 ELECTROCARDIOGRAM COMPLETE: CPT | Performed by: INTERNAL MEDICINE

## 2021-10-29 PROCEDURE — G8399 PT W/DXA RESULTS DOCUMENT: HCPCS | Performed by: INTERNAL MEDICINE

## 2021-10-29 RX ORDER — LISINOPRIL 10 MG/1
10 TABLET ORAL 2 TIMES DAILY
Qty: 6 TABLET | Refills: 6 | Status: SHIPPED
Start: 2021-10-29 | End: 2022-01-25 | Stop reason: DRUGHIGH

## 2021-10-29 NOTE — PROGRESS NOTES
[address]  Office Visit Progress Note    Tara Osborn  [birth year] October 29, 2021    CC: \"I still am short of breath off/on. \"     HPI:  The patient is 77 y.o. female with a past medical history significant for CAD, atrial fib, HTN, aortic aneurysm and COPD who is here for follow up. Admitted 1/7/2018-1/12/2018  With SOB and chest pain and dx with acute on chronic diastolic HF (diuresed), and atrial fib. Troponins elevated, testing pended due to sepsis from the flu. Outpatient stress testing positive and she underwent cardiac cath on 3/2/18 which revealed  of the RCA and nonobstructive CAD of the LAD and LCX. She also underwent endovascular AAA repair on 3/21/18 by Dr. Charu Zambrano. Patient was  in the hospital and had 2/25/20 for atrial fibrillation along with signs and symptoms of heart failure. She converted to sinus rhythm after she was started on amiodarone therapy. Continued management per Dr. Rhona Yousif. S/p PVI ablation 10/2020, she then had Afib ablation last year but had recurrence of A fib She underwent DCCV 1/12/21,  repeat AFIB ablation 2/17/21. continue Xarelto,  Toprol-XL. Flecainide discontinued 5/18/21 with plans for 30 day Event monitor at his f/u with Ophelia Fournier CNP. Today, she had a HFU 9/16/21 after being admitted 9-9-21 to 9-12-21 acute on chronic diastolic heart failure, pleural effusion and AFIB. DC weight 221#. She continues with off/on SOB per patient's report. Patient denies exertional chest pain/pressure, dyspnea at rest,  PND, orthopnea, palpitations, lightheadedness, weight changes, changes in LE edema, and syncope. Review of Systems:  Constitutional: Denies falls, night sweats or fever. Fatigue improved.    HEENT: Denies new visual changes, ringing in ears, nosebleeds, nasal congestion  Respiratory: + SOBE (chronic but but stable)  Cardiovascular: see HPI  GI: Denies N/V, diarrhea, constipation, abdominal pain, change in bowel habits, melena or hematochezia  : Denies urinary frequency, urgency, incontinence, hematuria or dysuria. Skin: Denies rash, hives, or cyanosis  Musculoskeletal: Denies joint or muscle aches/pain  Neurological: Denies syncope or TIA-like symptoms. Psychiatric: Denies anxiety or depression, negative insomnia     Past Medical History:   Diagnosis Date    AAA [facility]abdominal [facility]) [facility]     pt states it is 4cm    AAA (abdominal aortic aneurysm) without rupture ([facility] 2/10/2015    Atrial fibrillation (HCC)     CAD (coronary artery disease)     CHF (congestive heart failure) ([facility]     [facility] obstructive [facility]     History of blood clots     Hyperlipidemia     Hypertension      Past Surgical History:   Procedure Laterality Date    ABDOMINAL AORTIC ANEURYSM REPAIR      Endovascular abdominal AA    APPENDECTOMY      incidental    BLADDER SUSPENSION      CATARACT REMOVAL      CHOLECYSTECTOMY  10/15/13    COLONOSCOPY  07    dr Mike Gupta and check in 5 years.  COLONOSCOPY  2017    ok dr arndt, repeat 5 years   [facility]    for benign tumor.  just the uterus    JOINT REPLACEMENT  2013    right knee replacement    TONSILLECTOMY  as a child    TUMOR EXCISION      benign behind right ear about      Family History   Problem Relation Age of Onset    Heart Disease Father     Hypertension Other      Social History     Tobacco Use    Smoking status: Former Smoker     Packs/day: 1.00     Years: 37.00     Pack years: 37.00     Types: Cigarettes     Start date: 1976     Quit date: 2013     Years since quittin.1    Smokeless tobacco: Never Used    Tobacco comment: E cigarette now and then   Vaping Use    Vaping Use: Some days    Substances: Nicotine   Substance Use Topics    Alcohol use: No    Drug use: No       Allergies   Allergen Reactions    Morphine Rash     rash    Other Rash     Current Outpatient Medications   Medication Sig 0.4 MG SL tablet Place 1 tablet under the tongue every 5 minutes as needed for Chest pain 25 tablet 1    Multiple Vitamins-Minerals (MULTI FOR HER 50+ PO) Take 1 tablet by mouth daily       No current facility-administered medications for this visit. Physical Exam:   BP (!) 142/70 (Site: Right Upper Arm, Position: Sitting, Cuff Size: Medium Adult)   Pulse 82   Ht 5' 4\" (1.626 m)   Wt 216 lb (98 kg)   BMI 37.08 kg/m²   Wt Readings from Last 2 Encounters:   10/29/21 216 lb (98 kg)   09/20/21 217 lb 9.6 oz (98.7 kg)     Physical Exam:   Constitutional: The patient is oriented to person, place, and time. Appears well-developed and well-nourished. In no acute distress. Head: Normocephalic and atraumatic. Pupils equal and round. Neck: Neck supple. No JVP or carotid bruit appreciated. No mass and no thyromegaly present. No lymphadenopathy present. Cardiovascular: Normal rate and regular rhythm. Normal heart sounds. Exam reveals no gallop and no friction rub. No murmur heard. Pulmonary/Chest: Effort normal and breath sounds normal. No respiratory distress. + wheezes, no rhonchi or rales. Abdominal: Soft, non-tender. Bowel sounds are normal. Exhibits no organomegaly, mass or bruit. Extremities: BLE edema, L>R. No cyanosis or clubbing. Pulses are 2+ radial and carotid bilaterally. Neurological: No gross cranial nerve deficit. Coordination normal.   Skin: Skin is warm and dry. There is no rash or diaphoresis. Psychiatric: Patient has a normal mood and affect.  Speech is normal and behavior is normal.     Lab Review:   Lab Results   Component Value Date    TRIG 59 09/10/2021    HDL 56 09/10/2021    HDL 38 09/09/2011    LDLCALC 39 09/10/2021    LDLDIRECT 111 09/06/2012    LABVLDL 12 09/10/2021     Lab Results   Component Value Date     09/16/2021    K 5.1 09/16/2021    K 4.2 09/09/2021    CL 99 09/16/2021    CO2 29 09/16/2021    BUN 20 09/16/2021    CREATININE 1.2 09/16/2021    GLUCOSE 93 09/16/2021 GLUCOSE 102 2016    CALCIUM 9.8 2021      Lab Results   Component Value Date    WBC 11.2 (H) 2021    HGB 11.4 (L) 2021    HCT 35.2 (L) 2021    MCV 87.0 2021     2021       EK2018: Sinus rhythm with old anterior infarct  18: Sinus Rhythm, PACs, Old anterior infarct. 19: Sinus Rhythm  20: Sinus  Rhythm  - occasional PAC, # PACs = 1, -Anteroseptal infarct -age undetermined.    -Nonspecific ST depression  -Nondiagnostic. 21: Sinus rhythm  -First degree A-V block , -Anteroseptal infarct -age undetermined. 10/29/21:     Echo 2018:  Mild concentric left ventricular hypertrophy. Visually estimated ejection fraction of 65%. Mitral annular calcification. Mild left atrial dilation. Mild aortic stenosis. (mean gradients [id number])  The systolic pulmonary artery pressure (SPAP) estimated at 30 mmHg  (estimated RA pressure of 8 mmHg included). [facility]: ([address]) 2017   of RCA chronic LAD 10-20% RA 4 PA24/9 16 wedge 9 LVEDP markedly elevated 30    Carotid US 10/2017 at [address]:  1. Estimated diameter reduction of the right internal carotid artery is  less than 50%. 2.  Estimated diameter reduction of the left internal carotid artery is  50-69%. 3.  The bilateral common carotid arteries reveal no evidence of   significant stenosis. 4.  There is greater than 50% stenosis of the right external carotid  artery. 5.  There is no evidence of significant stenosis in the left external  carotid artery. 6.  The bilateral vertebral arteries are patent with antegrade flow. 7.  The bilateral subclavian arteries reveal no evidence of significant  stenosis. 8.  There has been no significant change from the previous study of  2017.     Lexiscan 18   Summary    Abnormal study. There is a moderate sized, mild intensity, partially    reversible defect of the mid to apical anterior and mid anterolateral walls    which is consistent with ischemia. There is breast attenuation but stress    images appear worse.    Normal LV size and systolic function.    Overall findings represent an intermediate risk study     Cardiac Cath 3/5/18  OVERALL IMPRESSION  1. Again, 100% proximal right coronary artery occlusion with left to right  collaterals. 2.  Mild disease of the distal left main trunk. 3.  20% to 30% ostial left anterior descending artery stenosis with  collaterals from LAD to the right coronary artery. 4.  30% to 40% stenosis of the proximal circumflex artery. 5.  Normal left ventricular systolic function with estimated EF of 55% to  60%. 6.  Slightly enlarged aortic root with no evidence of aortic stenosis or  regurgitation.     In view of the above findings, we will okay the patient for her upcoming  aortic aneurysm surgery from cardiac standpoint. ECHO 1/2/20  There is moderate concentric left ventricular hypertrophy. Patient appears to be in atrial fibrillation. Ejection fraction is estimated to be 50-60%. Indeterminate diastolic function. Mild aortic regurgitation. Mild tricuspid regurgitation. Mild mitral regurgitation with mitral annular calcification    Right Heart Cath 2/28/2020  1.  _____ normal right cardiac pressure. 2.  Elevated pulmonary capillary wedge pressure with a mean pulmonary  capillary wedge of 29 mmHg. 3.  Normal cardiac output and indices. 4.  No oxygen step off to suggest ASD/PFO.    In view of the above findings, we will start the patient on a small dose  of diuretic therapy and see whether we need to adjust some of her  antihypertensive medicines or not. Her findings are consistent with a  diastolic heart failure. 30 day Event monitor: 5/18/21 pending per EP     Assessment/Plan:     Chronic diastolic heart failure  She appears compensated on exam but continues with off/on chronic LI, no BLE edema. BP is mildly elevated today  I would like to increase Lisinopril to 10 mg BID. Continue Toprol-XL.    No aldactone secondary to elevated Cr. Encouraged medication compliance, low salt diet with hx of indiscretions, compression stockings and elevating legs. BMP, BP improving 9/16/21   We will repeat her BMP, BNP in few weeks     NYHA  class II    Coronary artery disease involving native coronary artery of native heart without angina pectoris  She denies any signs or symptoms of angina today but continues with LI, chronic off/on. Continue medical management with BB, statin, Imdur, hydralainze and PRN SL Nitro. She previously reported myalgias with Crestor with resolution of cramping. Lipid profile 9/10/21 wnl. Essential hypertension  Blood pressure is stable today on medical therapy. Will monitor BMP- 9/16/21 abnormal will repeat in a couple of weeks. Persistent atrial fibrillation   CHADS VASC score 7 for age, gender, DVT, CHF, HTN, CAD. Managed per Dr. Erik Ya. S/p PVI ablation 10/2020, she then had Afib ablation last year but had recurrence of A fib She underwent DCCV 1/12/21,  repeat AFIB ablation 2/17/21. continue Xarelto,  Toprol-XL. Flecainide discontinued 5/18/21 with plans for 30 day Event monitor at his f/u with Loren King CNP which revealed sinus, atrial runs with no AFIB/Flutter. F/u with Dr. Erik Ya 8/23/21. AAA (abdominal aortic aneurysm) without rupture   Underwent endovascular AAA repair on 3/21/18 by Dr. Yesenia Chavez. Follows vascular surgery    Sleep apnea  Intolerant to CPAP. COPD/Asthma managed per Dr. Fe Taylor. + wheezes scattered on physical exam. Has call into Dr. Dimitris Cast to discuss medical therapy. COVID vaccines + booster. She declines influenza due to severe reaction. Follow up in 3 month. Complexity of medical decision making-high    Thank you very much for allowing me to participate in the care of your patient. Please do not hesitate to contact me if you have any questions.     Sincerely,  Cele Prajapati MD      [address]  [address] [facility], [address] [place] [address]  Ph: [phone]  Fax: [phone]  This note was scribed in the presence of Dr. Pearle Lennox, MD by Jose G Bundy RN.

## 2021-10-29 NOTE — PATIENT INSTRUCTIONS
1) INCREASE Lisinopril to 10 mg twice daily  2) Blood work in 3 weeks  3) Work on low salt diet  4) F/u in 3 months-we will call once the 2022 schedule opens up  Patient Education        Avoiding Triggers With Heart Failure: Care Instructions  Your Care Instructions     Triggers are anything that make your heart failure flare up. A flare-up is also called \"sudden heart failure\" or \"acute heart failure. \" When you have a flare-up, fluid builds up in your lungs, and you have problems breathing. You might need to go to the hospital. By watching for changes in your condition and avoiding triggers, you can prevent heart failure flare-ups. Follow-up care is a key part of your treatment and safety. Be sure to make and go to all appointments, and call your doctor if you are having problems. It's also a good idea to know your test results and keep a list of the medicines you take. How can you care for yourself at home? Watch for changes in your weight and condition  · Weigh yourself without clothing at the same time each day. Record your weight. Call your doctor if you have sudden weight gain, such as more than 2 to 3 pounds in a day or 5 pounds in a week. (Your doctor may suggest a different range of weight gain.) A sudden weight gain may mean that your heart failure is getting worse. · Keep a daily record of your symptoms. Write down any changes in how you feel, such as new shortness of breath, cough, or problems eating. Also record if your ankles are more swollen than usual and if you feel more tired than usual. Note anything that you ate or did that could have triggered these changes. Limit sodium  Sodium causes your body to hold on to extra water. This may cause your heart failure symptoms to get worse. People get most of their sodium from processed foods. Fast food and restaurant meals also tend to be very high in sodium. · Your doctor may suggest that you limit sodium.  Your doctor can tell you how much sodium is right for you. This includes limiting sodium in cooked and packaged foods. · Read food labels on cans and food packages. They tell you how much sodium you get in one serving. Check the serving size. If you eat more than one serving, you are getting more sodium. · Be aware that sodium can come in forms other than salt, including monosodium glutamate (MSG), sodium citrate, and sodium bicarbonate (baking soda). MSG is often added to Asian food. You can sometimes ask for food without MSG or salt. · Slowly reducing salt will help you adjust to the taste. Take the salt shaker off the table. · Flavor your food with garlic, lemon juice, onion, vinegar, herbs, and spices instead of salt. Do not use soy sauce, steak sauce, onion salt, garlic salt, mustard, or ketchup on your food, unless it is labeled \"low-sodium\" or \"low-salt. \"  · Make your own salad dressings, sauces, and ketchup without adding salt. · Use fresh or frozen ingredients, instead of canned ones, whenever you can. Choose low-sodium canned goods. · Eat less processed food and food from restaurants, including fast food. Exercise as directed  Moderate, regular exercise is very good for your heart. It improves your blood flow and helps control your weight. But too much exercise can stress your heart and cause a heart failure flare-up. · Check with your doctor before you start an exercise program.  · Walking is an easy way to get exercise. Start out slowly. Gradually increase the length and pace of your walk. Swimming, riding a bike, and using a treadmill are also good forms of exercise. · When you exercise, watch for signs that your heart is working too hard. You are pushing yourself too hard if you cannot talk while you are exercising. If you become short of breath or dizzy or have chest pain, stop, sit down, and rest.  · Do not exercise when you do not feel well. Take medicines correctly  · Take your medicines exactly as prescribed.  Call your doctor if you think you are having a problem with your medicine. · Make a list of all the medicines you take. Include those prescribed to you by other doctors and any over-the-counter medicines, vitamins, or supplements you take. Take this list with you when you go to any doctor. · Take your medicines at the same time every day. It may help you to post a list of all the medicines you take every day and what time of day you take them. · Make taking your medicine as simple as you can. Plan times to take your medicines when you are doing other things, such as eating a meal or getting ready for bed. This will make it easier to remember to take your medicines. · Get organized. Use helpful tools, such as daily or weekly pill containers. When should you call for help? Call 911  if you have symptoms of sudden heart failure such as:    · You have severe trouble breathing.     · You cough up pink, foamy mucus.     · You have a new irregular or rapid heartbeat. Call your doctor now or seek immediate medical care if:    · You have new or increased shortness of breath.     · You are dizzy or lightheaded, or you feel like you may faint.     · You have sudden weight gain, such as more than 2 to 3 pounds in a day or 5 pounds in a week. (Your doctor may suggest a different range of weight gain.)     · You have increased swelling in your legs, ankles, or feet.     · You are suddenly so tired or weak that you cannot do your usual activities. Watch closely for changes in your health, and be sure to contact your doctor if you develop new symptoms. Where can you learn more? Go to https://tokia.lterika[employer]rca Systems.Storemates. org and sign in to your Feedback-Machine account. Enter W224 in the [facility] box to learn more about \"Avoiding Triggers With Heart Failure: Care Instructions. \"     If you do not have an account, please click on the \"Sign Up Now\" link.   Current as of: April 29, 2021               Content Version: 13.0  © 3415-1643 Healthwise, Incorporated. Care instructions adapted under license by [facility] ([facility]). If you have questions about a medical condition or this instruction, always ask your healthcare professional. [address] any warranty or liability for your use of this information. Patient Education        How to Read a Food Label to Limit Sodium: Care Instructions  Overview  Limiting sodium can be an important part of managing some health problems. Processed foods, fast food, and restaurant foods are the major sources of dietary sodium. The most common name for sodium is salt. Most packaged foods have a Nutrition Facts label. This will tell you how much sodium is in one serving of food. Follow-up care is a key part of your treatment and safety. Be sure to make and go to all appointments, and call your doctor if you are having problems. It's also a good idea to know your test results and keep a list of the medicines you take. How can you care for yourself at home? Read ingredient lists on food labels  · Read the list of ingredients on food labels to help you find how much sodium is in a food. The label lists the ingredients in a food in descending order (from the most to the least). If salt or sodium is high on the list, there may be a lot of sodium in the food. · Know that sodium has different names. Sodium is also called monosodium glutamate (MSG, common in [facility] food), sodium citrate, sodium alginate, and sodium phosphate. Read Nutrition Facts labels  · On most foods, there is a Nutrition Facts label. This will tell you how much sodium is in one serving of food. Look at both the serving size and the sodium amount. The serving size is located at the top of the label, usually right under the \"Nutrition Facts\" title. The amount of sodium is given in the list under the title. It is given in milligrams (mg). ? Check the serving size carefully.  A single serving is often very small, and you may eat more than one serving. If this is the case, you will eat more sodium than listed on the label. For example, if the serving size for a canned soup is 1 cup and the sodium amount is 470 mg, if you have 2 cups you will eat 940 mg of sodium. · The nutrition facts for fresh fruits and vegetables are not listed on the food. They may be listed somewhere in the store. These foods usually have no sodium or low sodium. · The Nutrition Facts label also gives you the Percent Daily Value for sodium. This is how much of the recommended amount of sodium a serving contains. The daily value for sodium is less than 2,300 mg. So if the Percent Daily Value says 50%, this means one serving is giving you half of this, or 1,150 mg. Buy low-sodium foods  · Look for foods that are made with less sodium. Watch for the following words on the label. ? \"Unsalted\" means there is no sodium added to the food. But there may be sodium already in the food naturally. ? \"Sodium-free\" means a serving has less than 5 mg of sodium. ? \"Very low sodium\" means a serving has 35 mg or less of sodium. ? \"Low-sodium\" means a serving has 140 mg or less of sodium. · \"Reduced-sodium\" means that there is 25% less sodium than what the food normally has. This is still usually too much sodium. Try not to buy foods with this on the label. · Buy fresh vegetables, or frozen vegetables without added sauces. Buy low-sodium versions of canned vegetables, soups, and other canned goods. Where can you learn more? Go to https://[employer] Genius.3DSoC. org and sign in to your JournalDoc account. Enter 26 [id number] in the [facility] box to learn more about \"How to Read a Food Label to Limit Sodium: Care Instructions. \"     If you do not have an account, please click on the \"Sign Up Now\" link. Current as of: December 17, 2020               Content Version: 13.0  © 2519-9253 Healthwise, Incorporated. Care instructions adapted under license by [facility] ([facility]). If you have questions about a medical condition or this instruction, always ask your healthcare professional. [place] [address] any warranty or liability for your use of this information.

## 2021-11-01 ENCOUNTER — TELEPHONE (OUTPATIENT)
Dept: FAMILY MEDICINE CLINIC | Age: 76
End: 2021-11-01

## 2021-11-01 RX ORDER — TRAZODONE HYDROCHLORIDE 50 MG/1
50 TABLET ORAL NIGHTLY PRN
Qty: 30 TABLET | Refills: 1 | Status: SHIPPED | OUTPATIENT
Start: 2021-11-01 | End: 2022-05-13

## 2021-11-01 NOTE — TELEPHONE ENCOUNTER
Lolis Fox done   Orders Placed This Encounter   Medications    traZODone (DESYREL) 50 MG tablet     Sig: Take 1 tablet by mouth nightly as needed for Sleep     Dispense:  30 tablet     Refill:  1

## 2021-11-02 NOTE — ASSESSMENT & PLAN NOTE
No symptoms of acute exacerbation today. Continue current medication regimen. Action plan reviewed today with patient. Continue management and follow-up with pulmonology.

## 2021-11-02 NOTE — ASSESSMENT & PLAN NOTE
No symptoms of acute exacerbation today. Continue daily weight, sodium and fluid restriction. Emergency action plan reviewed today with patient. Continue management and follow-up with cardiology as scheduled.

## 2021-11-08 DIAGNOSIS — I50.33 ACUTE ON CHRONIC DIASTOLIC HEART FAILURE (HCC): ICD-10-CM

## 2021-11-08 DIAGNOSIS — R06.09 DOE (DYSPNEA ON EXERTION): ICD-10-CM

## 2021-11-08 DIAGNOSIS — E86.0 DEHYDRATION: ICD-10-CM

## 2021-11-08 DIAGNOSIS — I50.32 CHRONIC DIASTOLIC HEART FAILURE (HCC): ICD-10-CM

## 2021-11-09 ENCOUNTER — TELEPHONE (OUTPATIENT)
Dept: PULMONOLOGY | Age: 76
End: 2021-11-09

## 2021-11-09 DIAGNOSIS — J44.9 COPD, MODERATE (HCC): Primary | ICD-10-CM

## 2021-11-09 LAB
ANION GAP SERPL CALCULATED.3IONS-SCNC: 20 MMOL/L (ref 3–16)
BUN BLDV-MCNC: 36 MG/DL (ref 7–20)
CALCIUM SERPL-MCNC: 9.8 MG/DL (ref 8.3–10.6)
CHLORIDE BLD-SCNC: 100 MMOL/L (ref 99–110)
CO2: 21 MMOL/L (ref 21–32)
CREAT SERPL-MCNC: 1.5 MG/DL (ref 0.6–1.2)
GFR AFRICAN AMERICAN: 41
GFR NON-AFRICAN AMERICAN: 34
GLUCOSE BLD-MCNC: 97 MG/DL (ref 70–99)
POTASSIUM SERPL-SCNC: 4.2 MMOL/L (ref 3.5–5.1)
PRO-BNP: 815 PG/ML (ref 0–449)
SODIUM BLD-SCNC: 141 MMOL/L (ref 136–145)

## 2021-11-10 RX ORDER — ALBUTEROL SULFATE 90 UG/1
2 AEROSOL, METERED RESPIRATORY (INHALATION) EVERY 4 HOURS PRN
Qty: 3 EACH | Refills: 3 | Status: SHIPPED
Start: 2021-11-10 | End: 2022-01-25 | Stop reason: CLARIF

## 2021-11-10 NOTE — TELEPHONE ENCOUNTER
Patient called in to let us know that she has been in contact with [address] and they did not receive a refill for the Albuterol

## 2021-11-16 ENCOUNTER — TELEPHONE (OUTPATIENT)
Dept: FAMILY MEDICINE CLINIC | Age: 76
End: 2021-11-16

## 2021-11-16 NOTE — TELEPHONE ENCOUNTER
----- Message from Donivan Kehr sent at 11/16/2021  3:42 PM EST -----  Subject: Referral Request    QUESTIONS   Reason for referral request? Pt was referred to Dr. Lincoln James and doesn't   like the experience she has had - no response from her calls from them. Please call pt to discuss a referral to another physician. Has the physician seen you for this condition before? Yes  Select a date? 2021-11-16  Select the Provider the patient wants to be referred to, if known (PCP or   Specialist)? Outside Physician - any   Preferred Specialist (if applicable)? Do you already have an appointment scheduled? No  Additional Information for Provider?   ---------------------------------------------------------------------------  --------------  CALL BACK INFO  What is the best way for the office to contact you? OK to leave message on   voicemail  Preferred Call Back Phone Number?  [phone]

## 2021-11-16 NOTE — TELEPHONE ENCOUNTER
Monik Grier advised and states its for [facility]. Dr. Lois Holter [phone] was given to Monik Grier.

## 2021-11-22 ENCOUNTER — TELEPHONE (OUTPATIENT)
Dept: FAMILY MEDICINE CLINIC | Age: 76
End: 2021-11-22

## 2021-11-22 NOTE — TELEPHONE ENCOUNTER
----- Message from Terrell Vuong sent at 11/22/2021  9:51 AM EST -----  Subject: Message to Provider    QUESTIONS  Information for Provider? would like nurse Chema Le from dr. Heena Ken to give   her a call about her husbands ultrasound   ---------------------------------------------------------------------------  --------------  CALL BACK INFO  What is the best way for the office to contact you? OK to leave message on   voicemail  Preferred Call Back Phone Number? [phone]  ---------------------------------------------------------------------------  --------------  SCRIPT ANSWERS  Relationship to Patient?  Self

## 2021-12-06 ENCOUNTER — TELEPHONE (OUTPATIENT)
Dept: PULMONOLOGY | Age: 76
End: 2021-12-06

## 2021-12-06 NOTE — TELEPHONE ENCOUNTER
Patient is in donut hole with Medicare and can not afford Symbicort , called looking for samples. Told her that we do not get Symbicort samples , she asked if we could let her try something else that we could supply her with samples of.  Please advise

## 2021-12-30 RX ORDER — METOPROLOL SUCCINATE 50 MG/1
TABLET, EXTENDED RELEASE ORAL
Qty: 60 TABLET | Refills: 0 | Status: SHIPPED
Start: 2021-12-30 | End: 2022-02-04 | Stop reason: HOSPADM

## 2021-12-30 NOTE — TELEPHONE ENCOUNTER
Last OV: 10/29/21  Next OV: 3 mth f/u 1/2022  Last refill:  Most recent Labs: 11/8/21  Last EKG (if needed):10/29/21

## 2022-01-13 RX ORDER — BUDESONIDE AND FORMOTEROL FUMARATE DIHYDRATE 80; 4.5 UG/1; UG/1
2 AEROSOL RESPIRATORY (INHALATION) 2 TIMES DAILY
Qty: 10.2 G | Refills: 0 | Status: CANCELLED | OUTPATIENT
Start: 2022-01-13

## 2022-01-13 RX ORDER — BUDESONIDE AND FORMOTEROL FUMARATE DIHYDRATE 160; 4.5 UG/1; UG/1
2 AEROSOL RESPIRATORY (INHALATION) 2 TIMES DAILY
Qty: 1 EACH | Refills: 5 | Status: SHIPPED | OUTPATIENT
Start: 2022-01-13 | End: 2022-07-27

## 2022-01-13 NOTE — TELEPHONE ENCOUNTER
Patient requests refill of Symbicort, if appropriate. Not sure if she is on this. She has not had this for a while. Notes mention Breo samples, which she never picked up.

## 2022-01-18 ENCOUNTER — OFFICE VISIT (OUTPATIENT)
Dept: VASCULAR SURGERY | Age: 77
End: 2022-01-18
Payer: MEDICARE

## 2022-01-18 ENCOUNTER — PROCEDURE VISIT (OUTPATIENT)
Dept: SURGERY | Age: 77
End: 2022-01-18
Payer: MEDICARE

## 2022-01-18 VITALS
BODY MASS INDEX: 37.22 KG/M2 | SYSTOLIC BLOOD PRESSURE: 120 MMHG | WEIGHT: 218 LBS | HEIGHT: 64 IN | DIASTOLIC BLOOD PRESSURE: 78 MMHG

## 2022-01-18 DIAGNOSIS — E66.9 OBESITY (BMI 30-39.9): ICD-10-CM

## 2022-01-18 DIAGNOSIS — I65.23 CAROTID STENOSIS, ASYMPTOMATIC, BILATERAL: Primary | ICD-10-CM

## 2022-01-18 DIAGNOSIS — I65.23 CAROTID ARTERY STENOSIS WITHOUT CEREBRAL INFARCTION, BILATERAL: ICD-10-CM

## 2022-01-18 DIAGNOSIS — I48.91 ATRIAL FIBRILLATION WITH RVR (HCC): ICD-10-CM

## 2022-01-18 DIAGNOSIS — Z95.828 HISTORY OF REPAIR OF ANEURYSM OF ABDOMINAL AORTA USING ENDOVASCULAR STENT GRAFT: ICD-10-CM

## 2022-01-18 DIAGNOSIS — I71.40 AAA (ABDOMINAL AORTIC ANEURYSM) WITHOUT RUPTURE: Primary | ICD-10-CM

## 2022-01-18 PROCEDURE — G8427 DOCREV CUR MEDS BY ELIG CLIN: HCPCS | Performed by: SURGERY

## 2022-01-18 PROCEDURE — 93880 EXTRACRANIAL BILAT STUDY: CPT | Performed by: STUDENT IN AN ORGANIZED HEALTH CARE EDUCATION/TRAINING PROGRAM

## 2022-01-18 PROCEDURE — 1090F PRES/ABSN URINE INCON ASSESS: CPT | Performed by: SURGERY

## 2022-01-18 PROCEDURE — G8417 CALC BMI ABV UP PARAM F/U: HCPCS | Performed by: SURGERY

## 2022-01-18 PROCEDURE — 4040F PNEUMOC VAC/ADMIN/RCVD: CPT | Performed by: SURGERY

## 2022-01-18 PROCEDURE — G8399 PT W/DXA RESULTS DOCUMENT: HCPCS | Performed by: SURGERY

## 2022-01-18 PROCEDURE — 1123F ACP DISCUSS/DSCN MKR DOCD: CPT | Performed by: SURGERY

## 2022-01-18 PROCEDURE — 99214 OFFICE O/P EST MOD 30 MIN: CPT | Performed by: SURGERY

## 2022-01-18 PROCEDURE — G8484 FLU IMMUNIZE NO ADMIN: HCPCS | Performed by: SURGERY

## 2022-01-18 PROCEDURE — 1036F TOBACCO NON-USER: CPT | Performed by: SURGERY

## 2022-01-18 PROCEDURE — 3017F COLORECTAL CA SCREEN DOC REV: CPT | Performed by: SURGERY

## 2022-01-18 ASSESSMENT — ENCOUNTER SYMPTOMS
RESPIRATORY NEGATIVE: 1
ALLERGIC/IMMUNOLOGIC NEGATIVE: 1
GASTROINTESTINAL NEGATIVE: 1

## 2022-01-18 NOTE — PROGRESS NOTES
Daily Progress Note   Melinda Wright MD      1/18/2022    Chief Complaint   Patient presents with    6 Month Follow-Up         HISTORY OF PRESENT ILLNESS:                The patient is a 77 y.o. female who presents with an office visit for a carotid duplex scan and office visit. Mrs. Jovon Vincent says she is doing okay. She says she has had her two covid vaccines and a booster. She says she has not had covid. Scan shows 42% stenosis on the right and 62% on the left carotids. She says she has had no TIA or stroke symptoms. Past Medical History:   Diagnosis Date    AAA (abdominal aortic aneurysm) ([facility] [address]     pt states it is 4cm    AAA (abdominal aortic aneurysm) without rupture (HCC) 2/10/2015    Atrial fibrillation (HCC)     CAD (coronary artery disease)     CHF (congestive heart failure) ([facility] [address]     COPD (chronic obstructive pulmonary disease) (HCC)     History of blood clots     Hyperlipidemia     Hypertension        Past Surgical History:   Procedure Laterality Date    ABDOMINAL AORTIC ANEURYSM REPAIR      Endovascular abdominal AA    APPENDECTOMY  1990    incidental    BLADDER SUSPENSION      CATARACT REMOVAL      CHOLECYSTECTOMY  10/15/13    COLONOSCOPY  9/2/07    dr Weir Dear and check in 5 years.  COLONOSCOPY  06/16/2017    ok dr arndt, repeat 5 years   [address]    for benign tumor.  just the uterus    JOINT REPLACEMENT  12/2013    right knee replacement    TONSILLECTOMY  as a child    TUMOR EXCISION      benign behind right ear about 2008       Social History     Socioeconomic History    Marital status:      Spouse name: Not on file    Number of children: Not on file    Years of education: Not on file    Highest education level: Not on file   Occupational History    Occupation: housewife   Tobacco Use    Smoking status: Former Smoker     Packs/day: 1.00     Years: 37.00     Pack years: 37.00     Types: Cigarettes     Start date: 12/2/1976     Quit date: 9/17/2013 Years since quittin.3    Smokeless tobacco: Never Used    Tobacco comment: E cigarette now and then   Vaping Use    Vaping Use: Some days    Substances: Nicotine   Substance and Sexual Activity    Alcohol use: No    Drug use: No    Sexual activity: Yes     Partners: Male   Other Topics Concern    Not on file   Social History Narrative    Not on file     Social Determinants of Health     Financial Resource Strain: Low Risk     Difficulty of Paying Living Expenses: Not hard at all   Food Insecurity: No Food Insecurity    Worried About Running Out of Food in the Last Year: Never true    Sarah of Food in the Last Year: Never true   Transportation Needs:     Lack of Transportation (Medical): Not on file    Lack of Transportation (Non-Medical):  Not on file   Physical Activity:     Days of Exercise per Week: Not on file    Minutes of Exercise per Session: Not on file   Stress:     Feeling of Stress : Not on file   Social Connections:     Frequency of Communication with Friends and Family: Not on file    Frequency of Social Gatherings with Friends and Family: Not on file    Attends Mandaeism Services: Not on file    Active Member of [address] [place] [address] or Organizations: Not on file    Attends Club or Organization Meetings: Not on file    Marital Status: Not on file   Intimate Partner Violence:     Fear of Current or Ex-Partner: Not on file    Emotionally Abused: Not on file    Physically Abused: Not on file    Sexually Abused: Not on file   Housing Stability:     Unable to Pay for Housing in the Last Year: Not on file    Number of [place] in the Last Year: Not on file    Unstable Housing in the Last Year: Not on file       Family History   Problem Relation Age of Onset    Heart Disease Father     Hypertension Other          Current Outpatient Medications:     budesonide-formoterol (SYMBICORT) 160-4.5 MCG/ACT AERO, Inhale 2 puffs into the lungs 2 times daily, Disp: 1 each, Rfl: 5   metoprolol succinate (TOPROL XL) 50 MG extended release tablet, TAKE ONE TABLET BY MOUTH DAILY, Disp: 60 tablet, Rfl: 0    traZODone (DESYREL) 50 MG tablet, Take 1 tablet by mouth nightly as needed for Sleep, Disp: 30 tablet, Rfl: 1    lisinopril (PRINIVIL;ZESTRIL) 10 MG tablet, Take 1 tablet by mouth 2 times daily, Disp: 6 tablet, Rfl: 6    rivaroxaban (XARELTO) 20 MG TABS tablet, TAKE ONE TABLET BY MOUTH DAILY WITH BREAKFAST, Disp: 30 tablet, Rfl: 11    furosemide (LASIX) 40 MG tablet, Take 1 tablet by mouth daily, Disp: 90 tablet, Rfl: 1    rosuvastatin (CRESTOR) 20 MG tablet, TAKE ONE TABLET BY MOUTH DAILY, Disp: 90 tablet, Rfl: 0    clobetasol (TEMOVATE) 0.05 % cream, Apply topically 2 times daily as needed for Rash Apply to rash between genital area and buttocks and around anus bid prn x up to 2 weeks -need at least 1 week break prior to restarting, Disp: , Rfl:     triamcinolone (KENALOG) 0.1 % ointment, Apply topically 2 times daily as needed for Rash Apply to rash between genital area and buttocks and around anus bid prn during breaks from clobetasol, Disp: , Rfl:     isosorbide mononitrate (IMDUR) 30 MG extended release tablet, TAKE ONE TABLET BY MOUTH DAILY (Patient taking differently: Take 30 mg by mouth daily ), Disp: 90 tablet, Rfl: 4    albuterol (PROVENTIL) (2.5 MG/3ML) 0.083% nebulizer solution, Take 3 mLs by nebulization every 4 hours as needed for Wheezing, Disp: 540 mL, Rfl: 2    NIFEdipine (ADALAT CC) 60 MG extended release tablet, TAKE ONE TABLET BY MOUTH DAILY (Patient taking differently: Take 60 mg by mouth daily ), Disp: 90 tablet, Rfl: 3    albuterol sulfate HFA (PROAIR HFA) 108 (90 Base) MCG/ACT inhaler, Inhale 2 puffs into the lungs every 4 hours as needed for Wheezing or Shortness of Breath, Disp: 1 Inhaler, Rfl: 11    nitroGLYCERIN (NITROSTAT) 0.4 MG SL tablet, Place 1 tablet under the tongue every 5 minutes as needed for Chest pain, Disp: 25 tablet, Rfl: 1    Multiple greater than 1200pg/ml have  a diagnostic sensitivity and specificity of 89% and 72% for  acute HF. Review of Systems   Constitutional: Negative. HENT: Negative. Respiratory: Negative. Cardiovascular: Negative. Gastrointestinal: Negative. Endocrine: Negative. Genitourinary: Negative. Musculoskeletal: Negative. Allergic/Immunologic: Negative. Neurological: Negative. Hematological: Negative. Psychiatric/Behavioral: Negative. All other systems reviewed and are negative. Physical Exam  Vitals and nursing note reviewed. Constitutional:       General: She is not in acute distress. Appearance: She is well-developed. She is not ill-appearing or toxic-appearing. Comments: Obese   HENT:      Head: Normocephalic and atraumatic. Right Ear: External ear normal.      Left Ear: External ear normal.   Eyes:      General: No scleral icterus. Pupils: Pupils are equal, round, and reactive to light. Neck:      Thyroid: No thyromegaly. Vascular: Normal carotid pulses. No carotid bruit or JVD. Trachea: No tracheal deviation. Cardiovascular:      Rate and Rhythm: Normal rate. Rhythm irregular. Pulses:           Carotid pulses are 2+ on the right side and 2+ on the left side. Radial pulses are 2+ on the right side and 1+ on the left side. Femoral pulses are 2+ on the right side and 2+ on the left side. Dorsalis pedis pulses are 2+ on the right side and 1+ on the left side. Posterior tibial pulses are 0 on the right side and 0 on the left side. Heart sounds: Normal heart sounds and S1 normal. No murmur heard.        Comments:   Doppler 1/18/2022:  Rt DP: monophasic  Rt PT: monophasic  Rt AT:     Lt DP:  monophasic  Lt PT: monophasic  Lt AT:      Doppler 2/15/19:    Right DP: Biphasic  Right PT: Monophasic (weak)    Left DP: Biphasic  Left PT: Monophasic  Pulmonary:      Effort: Pulmonary effort is normal. No tachypnea, accessory muscle usage or respiratory distress. Breath sounds: Normal breath sounds. No stridor. No wheezing or rales. Chest:   Breasts:      Right: No supraclavicular adenopathy. Left: No supraclavicular adenopathy. Abdominal:      General: Bowel sounds are normal. There is no distension or abdominal bruit. Palpations: Abdomen is soft. Abdomen is not rigid. There is no mass. Tenderness: There is no abdominal tenderness. There is no guarding or rebound. Hernia: No hernia is present. There is no hernia in the ventral area or left inguinal area. Genitourinary:     Comments: Rectal exam/stool guaiac not indicated. Musculoskeletal:         General: No tenderness. Right shoulder: No deformity. Normal range of motion. Cervical back: Normal range of motion and neck supple. No edema or erythema. Lymphadenopathy:      Head:      Right side of head: No submandibular, preauricular, posterior auricular or occipital adenopathy. Left side of head: No submandibular, preauricular, posterior auricular or occipital adenopathy. Cervical: No cervical adenopathy. Right cervical: No superficial, deep or posterior cervical adenopathy. Left cervical: No superficial, deep or posterior cervical adenopathy. Upper Body:      Right upper body: No supraclavicular or pectoral adenopathy. Left upper body: No supraclavicular or pectoral adenopathy. Lower Body: No right inguinal adenopathy. No left inguinal adenopathy. Skin:     General: Skin is warm and dry. Coloration: Skin is not pale. Findings: No bruising, erythema, laceration, lesion or rash. Neurological:      Mental Status: She is alert and oriented to person, place, and time. Cranial Nerves: No cranial nerve deficit. Sensory: No sensory deficit. Motor: No atrophy or abnormal muscle tone.       Coordination: Coordination normal.      Gait: Gait normal.      Deep Tendon Reflexes: Reflexes are normal and symmetric. Psychiatric:         Speech: Speech normal.         Behavior: Behavior normal. Behavior is cooperative. Thought Content: Thought content normal.         Judgment: Judgment normal.           ASSESSMENT:    Problem List Items Addressed This Visit        High    AAA (abdominal aortic aneurysm) without rupture (HCC) - Primary (Chronic)       Unprioritized    Obesity (BMI 30-39. 9)    History of repair of aneurysm of abdominal aorta using endovascular stent graft    Carotid artery stenosis without cerebral infarction, bilateral    Atrial fibrillation with RVR (HCC)          PLAN:  Her carotids are stable with 42% of the right internal carotid stent stenosis on the left internal carotid she had no symptoms went over what to look for again  Return in six months for a bilateral carotid duplex scan, and an aorta iliac scan with office visit. Heart very regular when we listening to it and listening to the Dopplers she said she had surgery for this but still apparently has  Return for a six month AAA study with OV. The patient will need to fast for at least 5 hours prior to the ultrasound scan. Please understand that by not doing so may result in having an inaccurate ultrasound and may cause your ultrasound to be rescheduled. Robel Farah MA am scribing for and in the presence of Chester Sutherland MD on this date of 01/18/22    I Gisselle De Souaz MD personally performed the services described in this documentation as scribed by the Medical Assistant Nereida Turcios in my presence and it is both accurate and complete.         Electronically signed by Chester Sutherland MD on 1/18/2022 at 3:41 PM

## 2022-01-18 NOTE — PATIENT INSTRUCTIONS
Return in six months for a bilateral carotid duplex scan, and an aorta iliac scan with office visit. Return for a six month AAA study with OV. The patient will need to fast for at least 5 hours prior to the ultrasound scan. Please understand that by not doing so may result in having an inaccurate ultrasound and may cause your ultrasound to be rescheduled.

## 2022-01-21 NOTE — PROGRESS NOTES
[facility]  Office Visit    Norma Roth  1945    January 25, 2022    CC:   Chief Complaint   Patient presents with    Check-Up     sob on exertion      HPI:  The patient is 77 y.o. female with a past medical history significant for CAD, chronic atrial fib, HTN, DHF, aortic aneurysm and COPD who is her for follow up at request of Dr. Terrie Fox d/t recurrent atrial fib and fatigue. She underwent cardiac cath in March 2018 which demonstrated  of RCA and nonobstructive disease of the LAD and LCX. She has had endovascular AAA repair in March 2018 by Dr. Terrie Fox. She continues to follow Dr. Guera Wood and THEE Aguilar CNP for her atrial fib and PVI ablation on 10/2020, DCCV on 1/12/2021 for recurrent atrial fib, repeat atrial fib ablation on 2/17/2021. She was hospitalized again in September 2021 with DHF, pleural effusions and atrial fib. Now being seen to evaluate her atrial fib and SOB/fatigue. Has noted increased fatigue last 4 weeks . SOB with exertion (chronic and at her normal). Sleeps elevated at night (has slept with HOB elevated x several years). Weight stable at home. Denies edema, chest pain/discomfort, PND, palpitations, dizziness/syncope or claudication. Had sleep eval in past and unable to tolerate CPAP. Has O2 at home she uses now and then. States her O2 sat at home has been good and in the 90's. No regular exercise. Attempts to monitor sodium intake. Review of Systems:  Constitutional: Denies night sweats or fever. + fatigue  HEENT: Denies new visual changes, ringing in ears, nosebleeds, nasal congestion  Respiratory: Denies new or change in SOB, PND, orthopnea or cough. Cardiovascular: see HPI  GI: Denies N/V, diarrhea, constipation, abdominal pain, change in bowel habits, melena or hematochezia  : Denies urinary frequency, urgency, incontinence, hematuria or dysuria.   Skin: Denies rash, hives, or cyanosis  Musculoskeletal: Denies joint or muscle aches/pain  Neurological: Denies syncope or TIA-like symptoms. Psychiatric: Denies anxiety, insomnia or depression     Past Medical History:   Diagnosis Date    AAA (abdominal aortic aneurysm) ([facility] [address]     pt states it is 4cm    AAA (abdominal aortic aneurysm) without rupture (HCC) 2/10/2015    Atrial fibrillation (HCC)     CAD (coronary artery disease)     CHF (congestive heart failure) ([facility] [address]     COPD (chronic obstructive pulmonary disease) (HCC)     History of blood clots     Hyperlipidemia     Hypertension      Past Surgical History:   Procedure Laterality Date    ABDOMINAL AORTIC ANEURYSM REPAIR      Endovascular abdominal AA    APPENDECTOMY      incidental    BLADDER SUSPENSION      CATARACT REMOVAL      CHOLECYSTECTOMY  10/15/13    COLONOSCOPY  07    dr Shay Grate and check in 5 years.  COLONOSCOPY  2017    ok dr arndt, repeat 5 years   [address]    for benign tumor.  just the uterus    JOINT REPLACEMENT  2013    right knee replacement    TONSILLECTOMY  as a child    TUMOR EXCISION      benign behind right ear about      Family History   Problem Relation Age of Onset    Heart Disease Father     Hypertension Other      Social History     Tobacco Use    Smoking status: Former Smoker     Packs/day: 1.00     Years: 37.00     Pack years: 37.00     Types: Cigarettes     Start date: 1976     Quit date: 2013     Years since quittin.3    Smokeless tobacco: Never Used    Tobacco comment: E cigarette now and then   Vaping Use    Vaping Use: Some days    Substances: Nicotine   Substance Use Topics    Alcohol use: No    Drug use: No       Allergies   Allergen Reactions    Morphine Rash     rash    Other Rash     Current Outpatient Medications   Medication Sig Dispense Refill    lisinopril (PRINIVIL;ZESTRIL) 20 MG tablet Take 20 mg by mouth daily      rosuvastatin (CRESTOR) 20 MG tablet Take 1 tablet by mouth daily 90 tablet 2    budesonide-formoterol (SYMBICORT) 160-4.5 MCG/ACT AERO Inhale 2 puffs into the lungs 2 times daily 1 each 5    metoprolol succinate (TOPROL XL) 50 MG extended release tablet TAKE ONE TABLET BY MOUTH DAILY 60 tablet 0    traZODone (DESYREL) 50 MG tablet Take 1 tablet by mouth nightly as needed for Sleep 30 tablet 1    rivaroxaban (XARELTO) 20 MG TABS tablet TAKE ONE TABLET BY MOUTH DAILY WITH BREAKFAST 30 tablet 11    furosemide (LASIX) 40 MG tablet Take 1 tablet by mouth daily 90 tablet 1    clobetasol (TEMOVATE) 0.05 % cream Apply topically 2 times daily as needed for Rash Apply to rash between genital area and buttocks and around anus bid prn x up to 2 weeks -need at least 1 week break prior to restarting      mupirocin (BACTROBAN) 2 % ointment Apply topically 3 times daily Apply to scabs three times daily for 10 days or until healed      triamcinolone (KENALOG) 0.1 % ointment Apply topically 2 times daily as needed for Rash Apply to rash between genital area and buttocks and around anus bid prn during breaks from clobetasol      isosorbide mononitrate (IMDUR) 30 MG extended release tablet TAKE ONE TABLET BY MOUTH DAILY (Patient taking differently: Take 30 mg by mouth daily ) 90 tablet 4    albuterol (PROVENTIL) (2.5 MG/3ML) 0.083% nebulizer solution Take 3 mLs by nebulization every 4 hours as needed for Wheezing 540 mL 2    NIFEdipine (ADALAT CC) 60 MG extended release tablet TAKE ONE TABLET BY MOUTH DAILY (Patient taking differently: Take 60 mg by mouth daily ) 90 tablet 3    albuterol sulfate HFA (PROAIR HFA) 108 (90 Base) MCG/ACT inhaler Inhale 2 puffs into the lungs every 4 hours as needed for Wheezing or Shortness of Breath 1 Inhaler 11    nitroGLYCERIN (NITROSTAT) 0.4 MG SL tablet Place 1 tablet under the tongue every 5 minutes as needed for Chest pain 25 tablet 1    Multiple Vitamins-Minerals (MULTI FOR HER 50+ PO) Take 1 tablet by mouth daily       No current facility-administered medications for this visit. Physical Exam:   /82   Pulse 90   Ht 5' 4\" (1.626 m)   Wt 218 lb (98.9 kg)   SpO2 98%   BMI 37.42 kg/m²   Wt Readings from Last 2 Encounters:   01/25/22 218 lb (98.9 kg)   01/18/22 218 lb (98.9 kg)     Constitutional: She is oriented to person, place, and time. She appears well-developed and well-nourished. In no acute distress. HEENT: Normocephalic and atraumatic. Sclerae anicteric. No xanthelasmas. EOM's intact. Neck: Supple. No JVD present. Carotids without bruits. No mass and no thyromegaly present. No lymphadenopathy present. Cardiovascular: slightly irreg; normal S1 and S2; soft systolic murmur  Pulmonary/Chest: Effort normal.  Lungs clear with mildly diminished breath sounds in posterior bases to auscultation. Chest wall nontender  Abdominal: soft, nontender, nondistended. + bowel sounds; no organomegaly or bruits. Extremities: No cyanosis, or clubbing. Pulses are 2+ radial/carotid/DP/PT bilaterally. Cap refill brisk. Trace bilateral ankle edema  Neurological: No focal deficit. Skin: Skin is warm and dry. Psychiatric: She has a normal mood and affect. Her speech is normal and behavior is normal.     Lab Review:   Lab Results   Component Value Date    TRIG 59 09/10/2021    HDL 56 09/10/2021    HDL 38 09/09/2011    LDLCALC 39 09/10/2021    LDLDIRECT 111 09/06/2012    LABVLDL 12 09/10/2021     Lab Results   Component Value Date     11/08/2021    K 4.2 11/08/2021    K 4.2 09/09/2021     11/08/2021    CO2 21 11/08/2021    BUN 36 11/08/2021    CREATININE 1.5 11/08/2021    GLUCOSE 97 11/08/2021    GLUCOSE 102 07/14/2016    CALCIUM 9.8 11/08/2021      Lab Results   Component Value Date    WBC 11.2 (H) 09/09/2021    HGB 11.4 (L) 09/09/2021    HCT 35.2 (L) 09/09/2021    MCV 87.0 09/09/2021     09/09/2021     ECG 1/25/2022:  Atrial flutter with controlled VR    Echo 9/10/2021: Mod conc. LVH with normal LV size & wall motion. EF is ~ 60%.  Diastolic filling parameters suggest grade II diastolic dysfunction. The left atrium is severely dilated. Normal right ventricular size and function. Trivial mitral, aortic and tricuspid regurgitation. Echo 9/25/2020:   Concentric LVH with normal LV size and wall motion. EF is    60%. Grade II diastolic dysfunction with elevated LV filling pressures. Trivial mitral regurgitation is present. The left atrium is severely dilated. Mild aortic regurgitation. Right ventricular systolic function is normal .   Trivial tricuspid regurgitation. Echo 1/2/2020: There is moderate concentric left ventricular hypertrophy. Patient appears to be in atrial fibrillation. Ejection fraction is estimated to be 50-60%. Indeterminate diastolic function. Mild aortic regurgitation. Mild tricuspid regurgitation. Mild mitral regurgitation with mitral annular calcification    2/28/2020 R heart cath:  HEMODYNAMIC DATA:  Right atrial mean pressure was 3 with oxygen  saturation of 69%. RV pressure was 33/2 with mean of 4 and oxygen  saturation of 72%. PA pressure was 43/14 with a mean of 24, oxygen  saturation was 68%. Pulmonary capillary wedge pressure mean was 29  mmHg.     Cardiac output by Blanca method was 6.13 liters per minute. Cardiac index  was 2.99 liters per minute per body surface area. Thermodilution  cardiac output was 4.88 liters per minute and cardiac index was 2.38  liters per minute per body surface area.     OVERALL IMPRESSION:  1.  _____ normal right cardiac pressure. 2.  Elevated pulmonary capillary wedge pressure with a mean pulmonary  capillary wedge of 29 mmHg. 3.  Normal cardiac output and indices. 4.  No oxygen step off to suggest ASD/PFO. Cardiac Cath 3/5/18  OVERALL IMPRESSION  1.  Again, 100% proximal right coronary artery occlusion with left to right  collaterals. 2.  Mild disease of the distal left main trunk.   3.  20% to 30% ostial left anterior descending artery stenosis with  collaterals from LAD to the right coronary artery. 4.  30% to 40% stenosis of the proximal circumflex artery. 5.  Normal left ventricular systolic function with estimated EF of 55% to  60%. 6.  Slightly enlarged aortic root with no evidence of aortic stenosis or  regurgitation. 2/19/2018 Lexiscan-Myoview:  Abnormal study. There is a moderate sized, mild intensity, partially    reversible defect of the mid to apical anterior and mid anterolateral walls    which is consistent with ischemia. There is breast attenuation but stress    images appear worse.    Normal LV size and systolic function.    Overall findings represent an intermediate risk study. 1/9/2018 Echo:   Mild concentric left ventricular hypertrophy. Visually estimated ejection fraction of 65%. Mitral annular calcification. Mild left atrial dilation. Mild aortic stenosis. (mean gradients [id number])   The systolic pulmonary artery pressure (SPAP) estimated at 30 mmHg   (estimated RA pressure of 8 mmHg included). [facility]: ([facility]) 4/2017   of RCA chronic LAD 10-20% RA 4 PA24/9 16 wedge 9 LVEDP markedly elevated 30     Assessment:    1. PAF (paroxysmal atrial fibrillation) (HCC)  -has had ablation x 2 for atrial fib + DCCV  -on Xarelto (CHADS VAsc score 7)  -last ablation 2/2021  -continue Toprol    2. Atrial flutter, paroxysmal (HCC)  -in atrial flutter today with controlled VR  -continue BB  -on anticoagulation    3. Chronic diastolic heart failure (HCC)  -currently appears euvolemic on exam  -will check BNP  -last echo in 9/2021: LVEF 60%; grade II diastolic dysfunction  -continue BB, lisinopril, lasix  -chronic SOB at baseline and combo of COPD and deconditioning    4. Coronary artery disease involving native coronary artery of native heart without angina pectoris  -prior cath:  of RCA; LAD and LCX nonobstructive on cath  -no recurrent angina  -continue medical management with imdur, Toprol, crestor  -not on ASA d/t Xarelto    5.  Essential hypertension  -controlled  -goal BP < 130/80  -continue medical management    6. Chronic obstructive pulmonary disease, unspecified COPD type ([facility] [address]  -follows with pulmonary (Dr. Cordelia Adam)    7. Hyperlipidemia LDL goal <70  -on statin    8. AAA (abdominal aortic aneurysm) without rupture ([facility])  -S/P endovascular repair in 2018 per Dr. Thornton Poster  -followed by vascular surgery    9. Severe obesity (BMI 35.0-35.9 with comorbidity) ([facility])  -weight loss encouraged    Plan:  Refer to Dr. Ruby Steve for recurrent atrial flutter  Continue lasix, imdur, Toprol, nifedipine,Xarelto, lisinopril and statin  Check BNP and BMP  Emphasized and discussed low-fat/low sodium diet, monitoring of daily weights, weight loss, fluid restriction, worsening signs and symptoms of heart failure and when to call, and the importance of regular exercise and activity. Follow up with Dr. Debbie English in 6 months or sooner if needed    Return in about 6 months (around 7/25/2022) for with Dr. Debbie English. Thanks for allowing me to participate in the care of this patient.       Severa Hatch, APRN-CNP  [facility], [address] [facility] [address], [address] [address]  Office: [phone]  Fax: [phone]      Electronically signed by CHRISTINE Bobby CNP on 1/25/2022 at 10:02 AM

## 2022-01-25 ENCOUNTER — OFFICE VISIT (OUTPATIENT)
Dept: CARDIOLOGY CLINIC | Age: 77
End: 2022-01-25
Payer: MEDICARE

## 2022-01-25 VITALS
HEIGHT: 64 IN | BODY MASS INDEX: 37.22 KG/M2 | OXYGEN SATURATION: 98 % | WEIGHT: 218 LBS | DIASTOLIC BLOOD PRESSURE: 82 MMHG | HEART RATE: 90 BPM | SYSTOLIC BLOOD PRESSURE: 138 MMHG

## 2022-01-25 DIAGNOSIS — I71.40 AAA (ABDOMINAL AORTIC ANEURYSM) WITHOUT RUPTURE: ICD-10-CM

## 2022-01-25 DIAGNOSIS — I48.92 ATRIAL FLUTTER, PAROXYSMAL (HCC): ICD-10-CM

## 2022-01-25 DIAGNOSIS — E66.01 SEVERE OBESITY (BMI 35.0-35.9 WITH COMORBIDITY) (HCC): ICD-10-CM

## 2022-01-25 DIAGNOSIS — I48.0 PAF (PAROXYSMAL ATRIAL FIBRILLATION) (HCC): Primary | ICD-10-CM

## 2022-01-25 DIAGNOSIS — J44.9 CHRONIC OBSTRUCTIVE PULMONARY DISEASE, UNSPECIFIED COPD TYPE (HCC): ICD-10-CM

## 2022-01-25 DIAGNOSIS — I50.32 CHRONIC DIASTOLIC HEART FAILURE (HCC): ICD-10-CM

## 2022-01-25 DIAGNOSIS — E78.5 HYPERLIPIDEMIA LDL GOAL <70: ICD-10-CM

## 2022-01-25 DIAGNOSIS — I10 ESSENTIAL HYPERTENSION: ICD-10-CM

## 2022-01-25 DIAGNOSIS — I25.10 CORONARY ARTERY DISEASE INVOLVING NATIVE CORONARY ARTERY OF NATIVE HEART WITHOUT ANGINA PECTORIS: ICD-10-CM

## 2022-01-25 PROCEDURE — G8399 PT W/DXA RESULTS DOCUMENT: HCPCS | Performed by: NURSE PRACTITIONER

## 2022-01-25 PROCEDURE — G8484 FLU IMMUNIZE NO ADMIN: HCPCS | Performed by: NURSE PRACTITIONER

## 2022-01-25 PROCEDURE — 99214 OFFICE O/P EST MOD 30 MIN: CPT | Performed by: NURSE PRACTITIONER

## 2022-01-25 PROCEDURE — G8427 DOCREV CUR MEDS BY ELIG CLIN: HCPCS | Performed by: NURSE PRACTITIONER

## 2022-01-25 PROCEDURE — 3023F SPIROM DOC REV: CPT | Performed by: NURSE PRACTITIONER

## 2022-01-25 PROCEDURE — 4040F PNEUMOC VAC/ADMIN/RCVD: CPT | Performed by: NURSE PRACTITIONER

## 2022-01-25 PROCEDURE — G8417 CALC BMI ABV UP PARAM F/U: HCPCS | Performed by: NURSE PRACTITIONER

## 2022-01-25 PROCEDURE — 1036F TOBACCO NON-USER: CPT | Performed by: NURSE PRACTITIONER

## 2022-01-25 PROCEDURE — 1123F ACP DISCUSS/DSCN MKR DOCD: CPT | Performed by: NURSE PRACTITIONER

## 2022-01-25 PROCEDURE — 93000 ELECTROCARDIOGRAM COMPLETE: CPT | Performed by: NURSE PRACTITIONER

## 2022-01-25 PROCEDURE — 1090F PRES/ABSN URINE INCON ASSESS: CPT | Performed by: NURSE PRACTITIONER

## 2022-01-25 PROCEDURE — 3017F COLORECTAL CA SCREEN DOC REV: CPT | Performed by: NURSE PRACTITIONER

## 2022-01-25 RX ORDER — ROSUVASTATIN CALCIUM 20 MG/1
20 TABLET, COATED ORAL DAILY
Qty: 90 TABLET | Refills: 2 | Status: ON HOLD | OUTPATIENT
Start: 2022-01-25

## 2022-01-25 RX ORDER — LISINOPRIL 20 MG/1
20 TABLET ORAL DAILY
COMMUNITY
End: 2022-03-21 | Stop reason: SDUPTHER

## 2022-01-28 DIAGNOSIS — I48.0 PAF (PAROXYSMAL ATRIAL FIBRILLATION) (HCC): ICD-10-CM

## 2022-01-28 DIAGNOSIS — I50.32 CHRONIC DIASTOLIC HEART FAILURE (HCC): ICD-10-CM

## 2022-01-28 LAB
ANION GAP SERPL CALCULATED.3IONS-SCNC: 11 MMOL/L (ref 3–16)
BUN BLDV-MCNC: 38 MG/DL (ref 7–20)
CALCIUM SERPL-MCNC: 10 MG/DL (ref 8.3–10.6)
CHLORIDE BLD-SCNC: 102 MMOL/L (ref 99–110)
CO2: 26 MMOL/L (ref 21–32)
CREAT SERPL-MCNC: 1.4 MG/DL (ref 0.6–1.2)
GFR AFRICAN AMERICAN: 44
GFR NON-AFRICAN AMERICAN: 37
GLUCOSE BLD-MCNC: 85 MG/DL (ref 70–99)
POTASSIUM SERPL-SCNC: 4.5 MMOL/L (ref 3.5–5.1)
SODIUM BLD-SCNC: 139 MMOL/L (ref 136–145)

## 2022-01-31 ENCOUNTER — OFFICE VISIT (OUTPATIENT)
Dept: CARDIOLOGY CLINIC | Age: 77
End: 2022-01-31
Payer: MEDICARE

## 2022-01-31 VITALS
HEIGHT: 64 IN | HEART RATE: 108 BPM | SYSTOLIC BLOOD PRESSURE: 136 MMHG | OXYGEN SATURATION: 97 % | DIASTOLIC BLOOD PRESSURE: 78 MMHG | WEIGHT: 219 LBS | BODY MASS INDEX: 37.39 KG/M2

## 2022-01-31 DIAGNOSIS — I48.92 ATRIAL FLUTTER, PAROXYSMAL (HCC): ICD-10-CM

## 2022-01-31 DIAGNOSIS — I50.32 CHRONIC DIASTOLIC HEART FAILURE (HCC): ICD-10-CM

## 2022-01-31 DIAGNOSIS — I48.0 PAF (PAROXYSMAL ATRIAL FIBRILLATION) (HCC): Primary | ICD-10-CM

## 2022-01-31 DIAGNOSIS — I71.40 AAA (ABDOMINAL AORTIC ANEURYSM) WITHOUT RUPTURE: ICD-10-CM

## 2022-01-31 DIAGNOSIS — I25.10 CORONARY ARTERY DISEASE INVOLVING NATIVE CORONARY ARTERY OF NATIVE HEART WITHOUT ANGINA PECTORIS: ICD-10-CM

## 2022-01-31 DIAGNOSIS — I10 ESSENTIAL HYPERTENSION: ICD-10-CM

## 2022-01-31 DIAGNOSIS — J44.9 CHRONIC OBSTRUCTIVE PULMONARY DISEASE, UNSPECIFIED COPD TYPE (HCC): ICD-10-CM

## 2022-01-31 PROCEDURE — G8427 DOCREV CUR MEDS BY ELIG CLIN: HCPCS | Performed by: INTERNAL MEDICINE

## 2022-01-31 PROCEDURE — 99215 OFFICE O/P EST HI 40 MIN: CPT | Performed by: INTERNAL MEDICINE

## 2022-01-31 PROCEDURE — G8417 CALC BMI ABV UP PARAM F/U: HCPCS | Performed by: INTERNAL MEDICINE

## 2022-01-31 PROCEDURE — 3023F SPIROM DOC REV: CPT | Performed by: INTERNAL MEDICINE

## 2022-01-31 PROCEDURE — 1090F PRES/ABSN URINE INCON ASSESS: CPT | Performed by: INTERNAL MEDICINE

## 2022-01-31 PROCEDURE — 1036F TOBACCO NON-USER: CPT | Performed by: INTERNAL MEDICINE

## 2022-01-31 PROCEDURE — 1123F ACP DISCUSS/DSCN MKR DOCD: CPT | Performed by: INTERNAL MEDICINE

## 2022-01-31 PROCEDURE — 93000 ELECTROCARDIOGRAM COMPLETE: CPT | Performed by: INTERNAL MEDICINE

## 2022-01-31 PROCEDURE — 4040F PNEUMOC VAC/ADMIN/RCVD: CPT | Performed by: INTERNAL MEDICINE

## 2022-01-31 PROCEDURE — G8484 FLU IMMUNIZE NO ADMIN: HCPCS | Performed by: INTERNAL MEDICINE

## 2022-01-31 PROCEDURE — G8399 PT W/DXA RESULTS DOCUMENT: HCPCS | Performed by: INTERNAL MEDICINE

## 2022-01-31 PROCEDURE — 3017F COLORECTAL CA SCREEN DOC REV: CPT | Performed by: INTERNAL MEDICINE

## 2022-01-31 NOTE — PROGRESS NOTES
Cardiac Electrophysiology Consultation   Date: 1/31/2022  Reason for Consultation: Atrial fibrillation / Left atrial flutter  Consult Requesting Physician:  Washington Jones MD  Primary Care Physician: Meche Echevarria MD    Chief Complaint:   Chief Complaint   Patient presents with    Follow-up     afib - SOB with exertion        HPI: Lannette Boeck is a 77 y.o. patient with a history of CAD, atrial fibrillation, hypertension, aortic aneurysm, COPD, PE , DVT,chronic diastolic heart failure. She was seen in office on 9/14/2020 for initial consultation  to discuss treatment options for her atrial fibrillation. She reported that she was symptomatic with shortness of breath and palpitations. She made the decision to undergo ablation for treatment of her atrial fibrillation and on 10/7/2020 she underwent electrophysiology study with radiofrequency ablation of atrial fibrillation and pulmonary vein isolation. She was seen in office by Willow Galaviz CNP on 1/7/2021 for her 3 months post ablation follow up and EKG confirmed atrial fibrillation/atrial flutter rate controlled at 72 bpm. She subsequently underwent cardioversion to restore NSR on 1/12/2021. Then on 2/17/2021 she underwent electrophysiology study with radiofrequency ablation of left atrial flutter and pulmonary vein isolation. She was seen in office on 5/18/2021 by Willwo Galaviz CNP for three month post ablation follow up and EKG showed SR. Flecainide was stopped and 30 day monitor was ordered to assess baseline rhythm apart from antiarrythmic medication. The monitor was worn from 5/19/2021-6/17/2021 and showed SR with several episodes of atrial runs no atrial fibrillation or atrial flutter seen. Interval History: Today, she presents to office accompanied by her  for follow up for management of atrial fibrillation/ left atrial flutter.  She was seen in office on 1/25/2022 by Malgorzata Mckeon CNP and EKG showed atrial flutter rate controlled at 73 budesonide-formoterol (SYMBICORT) 160-4.5 MCG/ACT AERO Inhale 2 puffs into the lungs 2 times daily 1/13/22  Yes Noah Jensen DO   metoprolol succinate (TOPROL XL) 50 MG extended release tablet TAKE ONE TABLET BY MOUTH DAILY 12/30/21  Yes Thiago Mojica MD   traZODone (DESYREL) 50 MG tablet Take 1 tablet by mouth nightly as needed for Sleep 11/1/21  Yes Mary Lou Bray MD   rivaroxaban (XARELTO) 20 MG TABS tablet TAKE ONE TABLET BY MOUTH DAILY WITH BREAKFAST 10/20/21  Yes Noah Jensen DO   furosemide (LASIX) 40 MG tablet Take 1 tablet by mouth daily 10/14/21  Yes Mary Lou Bray MD   clobetasol (TEMOVATE) 0.05 % cream Apply topically 2 times daily as needed for Rash Apply to rash between genital area and buttocks and around anus bid prn x up to 2 weeks -need at least 1 week break prior to restarting 8/31/21 8/31/22 Yes Historical Provider, MD   mupirocin (BACTROBAN) 2 % ointment Apply topically 3 times daily Apply to scabs three times daily for 10 days or until healed 8/31/21 8/31/22 Yes Historical Provider, MD   triamcinolone (KENALOG) 0.1 % ointment Apply topically 2 times daily as needed for Rash Apply to rash between genital area and buttocks and around anus bid prn during breaks from clobetasol 8/31/21 8/31/22 Yes Historical Provider, MD   isosorbide mononitrate (IMDUR) 30 MG extended release tablet TAKE ONE TABLET BY MOUTH DAILY  Patient taking differently: Take 30 mg by mouth daily  8/10/21  Yes Rafael Kat MD   albuterol (PROVENTIL) (2.5 MG/3ML) 0.083% nebulizer solution Take 3 mLs by nebulization every 4 hours as needed for Wheezing 7/8/21  Yes Michael Splinter, APRN - CNP   NIFEdipine (ADALAT CC) 60 MG extended release tablet TAKE ONE TABLET BY MOUTH DAILY  Patient taking differently: Take 60 mg by mouth daily  3/2/21  Yes Rafael Kat MD   albuterol sulfate HFA (PROAIR HFA) 108 (90 Base) MCG/ACT inhaler Inhale 2 puffs into the lungs every 4 hours as needed for Wheezing or Shortness of Breath 7/23/20  Yes Kenton Acosta, DO   nitroGLYCERIN (NITROSTAT) 0.4 MG SL tablet Place 1 tablet under the tongue every 5 minutes as needed for Chest pain 3/5/19  Yes Mariah Brambila MD   Multiple Vitamins-Minerals (MULTI FOR HER 50+ PO) Take 1 tablet by mouth daily   Yes Historical Provider, MD       Social History:   reports that she quit smoking about 8 years ago. Her smoking use included cigarettes. She started smoking about 45 years ago. She has a 37.00 pack-year smoking history. She has never used smokeless tobacco. She reports that she does not drink alcohol and does not use drugs. Family History:  family history includes Heart Disease in her father; Hypertension in an other family member. Reviewed. Denies family history of sudden cardiac death, arrhythmia, premature CAD    Review of System:    · General ROS: negative for - chills, fever   · Psychological ROS: negative for - anxiety or depression  · Ophthalmic ROS: negative for - eye pain or loss of vision  · ENT ROS: negative for - epistaxis, headaches, nasal discharge, sore throat   · Allergy and Immunology ROS: negative for - hives, nasal congestion   · Hematological and Lymphatic ROS: negative for - bleeding problems, blood clots, bruising or jaundice  · Endocrine ROS: negative for - skin changes, temperature intolerance or unexpected weight changes  · Respiratory ROS: negative for - shortness of breath, cough, hemoptysis, pleuritic pain, sputum changes or wheezing. · Cardiovascular ROS: Per HPI.    · Gastrointestinal ROS: negative for - abdominal pain, blood in stools, diarrhea, hematemesis, melena, nausea/vomiting or swallowing difficulty/pain  · Genito-Urinary ROS: negative for - dysuria or incontinence  · Musculoskeletal ROS: negative for - joint swelling or muscle pain  · Neurological ROS: negative for - confusion, dizziness, gait disturbance, headaches, numbness/tingling, seizures, speech problems, tremors, visual changes or weakness  · Dermatological ROS: negative for - rash    Physical Examination:  Vitals:    01/31/22 1308   BP: 136/78   Pulse: 108   SpO2: 97%       · Constitutional: Oriented. No distress. · Head: Normocephalic and atraumatic. · Mouth/Throat: Oropharynx is clear and moist.   · Eyes: Conjunctivae normal. EOM are normal.   · Neck: Normal range of motion. Neck supple. No rigidity. No JVD present. · Cardiovascular: Normal rate, regular rhythm, S1&S2 and intact distal pulses. · Pulmonary/Chest: Bilateral respiratory sounds. No wheezes. No rhonchi. · Abdominal: Soft. Bowel sounds present. No distension, No tenderness. · Musculoskeletal: No tenderness. No edema    · Lymphadenopathy: Has no cervical adenopathy. · Neurological: Alert and oriented. Cranial nerve appears intact, No Gross deficit   · Skin: Skin is warm and dry. No rash noted. · Psychiatric: Has a normal mood, affect and behavior     Labs:  Reviewed. ECG: atrial flutter with v-rate of 70-80's bpm with QRS duration 92 ms. No pathologic Q waves, ventricular pre-excitation, or QT prolongation. Studies:   1. Event monitor: 5/19/2021-6/17/2021   SR with several episodes of atrial runs no atrial fibrillation or atrial flutter seen. 2. Echo: 1/2/2020  There is moderate concentric left ventricular hypertrophy. Patient appears to be in atrial fibrillation. Ejection fraction is estimated to be 50-60%. Indeterminate diastolic function. Mild aortic regurgitation. Mild tricuspid regurgitation. Mild mitral regurgitation with mitral annular calcification    3. Winsome Farr 2/19/2018   Summary    Abnormal study. There is a moderate sized, mild intensity, partially    reversible defect of the mid to apical anterior and mid anterolateral walls    which is consistent with ischemia. There is breast attenuation but stress    images appear worse.    Normal LV size and systolic function.    Overall findings represent an intermediate risk study       4. Cath: 3/5/2018  OVERALL IMPRESSION  1.  Again, 100% proximal right coronary artery occlusion with left to right collaterals. 2.  Mild disease of the distal left main trunk. 3.  20% to 30% ostial left anterior descending artery stenosis with collaterals from LAD to the right coronary artery. 4.  30% to 40% stenosis of the proximal circumflex artery. 5.  Normal left ventricular systolic function with estimated EF of 55% to 60%. 6.  Slightly enlarged aortic root with no evidence of aortic stenosis or regurgitation.     In view of the above findings, we will okay the patient for her upcoming  aortic aneurysm surgery from cardiac standpoint. [facility]: ([facility]) 4/2017   of RCA chronic LAD 10-20% RA 4 PA24/9 16 wedge 9 LVEDP markedly elevated 30     Right Heart Cath 2/28/2020  1.  _____ normal right cardiac pressure. 2.  Elevated pulmonary capillary wedge pressure with a mean pulmonary  capillary wedge of 29 mmHg. 3.  Normal cardiac output and indices. 4.  No oxygen step off to suggest ASD/PFO. 6. Carotid US 10/2017 at [facility]:  1. Estimated diameter reduction of the right internal carotid artery is  less than 50%. 2.  Estimated diameter reduction of the left internal carotid artery is  50-69%. 3.  The bilateral common carotid arteries reveal no evidence of significant stenosis. 4.  There is greater than 50% stenosis of the right external carotid  artery. 5.  There is no evidence of significant stenosis in the left external  carotid artery. 6.  The bilateral vertebral arteries are patent with antegrade flow. 7.  The bilateral subclavian arteries reveal no evidence of significant  stenosis. 8.  There has been no significant change from the previous study of  4/21/2017.     I independently reviewed and interpreted the ECG, MCOT, echocardiogram, stress test, and coronary angiography/PCI results and used them for my plan of care. Procedures:  1.  Endovascular AAA repair on 3/21/2018 by Dr. Haydee Lau Christy  2. Electrophysiology study with radiofrequency ablation of atrial fibrillation and pulmonary vein isolation 10/7/2020.   3. Successful external DC cardioversion of symptomatic, persistent atrial fibrillation 1/12/2021.  4. Electrophysiology study with radiofrequency ablation of left atrial flutter and pulmonary vein isolation 2/17/2021. Assessment/Plan:     Atrial fibrillation / Left atrial flutter  -EKG today SR with PAC. -She has a [id number] Score 3 (HTN, AGE, female gender)  -On Xarelto 20 mg daily for  thromboembolic risk reduction.  -Tolerating well no signs or symptoms of abnormal bruising or bleeding.   -Advised that I would recommend that she continue on [address] [place] [address]  for at least 3 years, at which time, if there are no further recurrences of atrial fibrillation, the likelihood of recurrences subsequent to that would be very low. We will then, at that time, consider discontinuing the oral anticoagulant.    -s/p radiofrequency ablation of atrial fibrillation on 10/7/2020.    -At her three months post ablation follow up appointment EKG confirmed atrial fibrillation/atrial flutter rate controlled at 72 bpm. She subsequently underwent cardioversion to restored NSR on 1/12/2021.     -On 2/17/2021 she underwent electrophysiology study with radiofrequency ablation of left atrial flutter and pulmonary vein isolation.    -She wore MARILU from 5/19/2021-6/17/2021 which revealed SR with several episodes of atrial runs no atrial fibrillation or atrial flutter seen. -Recurrence of left atrial flutter seen on EKG 1/25/2022 & today 1/31/2022. - Treatment options for atrial fibrillation including cardioversion, anti-arrhythmic medication therapy, rate control strategy, oral anticoagulation, and atrial fibrillation ablation were discussed with patient. Risks, benefits and alternative of each treatment options (care, treatment, and services) were explained.  The patient was also presented reasonable alternatives to the proposed care, treatment, and services. The discussion I have had with the patient encompassed risks, benefits, and side effects related to the alternatives and the risks related to not receiving the proposed care, treatment and services. All questions were answered. -Recommend that she undergo CV to restore SR while awaiting ablation.  -Patient agreeable. We educated the patient that left atrial flutter is a worsening and progressive disease, with more frequent episodes that will ensue. Subsequent episodes usually become more sustained to the extent that many individuals would then develop persistent left atrial flutter. Once persistence is reached, permanent left atrial flutter is inevitable. We also discussed the fact that left atrial flutter is associated with stroke, including life-threatening stroke, and therefore oral anticoagulation is warranted depending on the patient's [id number] score. We discussed different management options for left atrial flutter including their risks and benefits. These options include use of cardioversion which provides an effective immediate therapy with success rates of 90% or higher, but it provides no short nor long term efficacy. Anti-arrhythmic medications has been very difficult to control left atrial flutter, both in regards to heart rate and rhythm control even with a powerful anti-arrhythmic medication (amiodarone). Left atrial flutter ablation is a potentially curative therapy with very reasonable success rate. The risks, benefits and alternatives of the left atrial flutter ablation procedure were discussed with the patient.  The risks including, but not limited to, bleeding, infection, radiation exposure, injury to vascular, cardiac and surrounding structures (including pneumothorax), stroke, cardiac perforation, tamponade, need for emergent open heart surgery, need for pacemaker implantation, injury to the phrenic nerve, injury to the esophagus, myocardial infarction and death were discussed in detail. The patient was also counseled at length about the risks of abhay Covid-19 in the niesha-operative and post-operative states including the recovery window of their procedure. The patient was made aware that abhay Covid-19 after a surgical procedure may worsen their prognosis for recovering from the virus and lend to a higher morbidity and or mortality risk. The patient was given the option of postponing their procedure. The patient was also presented reasonable alternatives to the proposed care, treatment, and services. The discussion I have had with the patient encompassed risks, benefits, and side effects related to the alternatives and the risks related to not receiving the proposed care, treatment and services. I spent 40 minutes face to face with the patient, with greater than 50% of that time spent in counseling on the above. The patient opted to proceed with the left atrial flutter ablation. We will schedule for a radiofrequency ablation with [employer] system with a KAYE procedure immediately prior to this ablation. We will hold Xarelto  for 12 hours prior to procedure. We will order BMP, CBC, PT/INR, and Type & Screen prior to the procedure. Much time was spent counseling the patient on healthier lifestyle, following a low sodium diet <2,000 mg of sodium a day and dramatically decreasing the intake of processed sugar. Chronic diastolic heart failure  -NYHA class II-III. -LVEF 60 %   -Per Echo 9/10/2021, grade II diastolic dysfunction.  -On guideline directed medical therapy. Beta Blocker: Toprol  XL               Aldosterone Antagonist: Not on due to elevated creatine                     Diuretic: Torsemide              ARB/ACE/ARNI: Not on due to elevated creatine  -Euvolemic on today's exam.  -She follows with Dr. Hilton Hammond for management.       Coronary artery disease involving native coronary artery of native heart without angina pectoris  -Denies angina.   -Continue with medical management and risk factor modification including statin, and beta blocker. Hypertension  Stable    AAA (abdominal aortic aneurysm) without rupture   Underwent endovascular AAA repair on 3/21/18 by Dr. Darlyn Patel. Follows with vascular surgery. Chronic obstructive pulmonary disease   Chronic shortness of breath. Centrilobular emphysema noted on CT scan. Managed by Dr. Fausto Yin MD.      Continue routine follow up with Dr. Rodríguez Serrato. Thank you for allowing me to participate in the care of Blessing Diane. All questions and concerns were addressed to the patient/family. Alternatives to my treatment were discussed. This note was scribed in the presence of Marylene Hale, MD by Stacey Bowen RN. The scribe's documentation has been prepared under my direction and personally reviewed by me in its entirety. I confirm that the note above accurately reflects all work, physical examination, the discussion of treatments and procedures, and medical decision making performed by me.     Marylene Hale, MD, MS, [facility] [place], [facility]  Cardiac Electrophysiology  [address]  [address] [facility], [address] [place] [address]  Jon Shah [facility] [address]  [phone]

## 2022-01-31 NOTE — PATIENT INSTRUCTIONS
If you have any question regarding your cardioversion or would like to proceed with scheduling please contact Dr. Yunier Renteria at [phone]. Cardioversion Pre Procedure Instructions     Date: 2-4-2022    Arrive at: 10:00 am    Procedure time: 11:00 am      The morning of your procedure you will park in the [facility] AT [facility] and report directly to the cath lab to check in. At the information desk stay right and go all the way to the end of the bautista, this will take you directly to your check in desk for the cath lab. Pre-Procedure Instructions   1. Do not eat or drink anything after midnight the day of your procedure. 2. Take your Xarelto the morning of the procedure with sips of water. 3. You may hold your Furosemide the morning of the procedure for comfort to decrease urinary urgency. 4. If you are a diabetic you will need to hold all diabetic medications including, Metformin the morning of the procedure. If you take Lantus/Levemir only take ½ your normal dose the evening before. All other medications can be taken in the morning with sips of water. 5. Do not use any lotions, creams or perfume the morning of procedure. 6. Pre-procedure lab work will need to be completed 5-7 days prior to procedure. 7. Please have a responsible adult to drive you home after procedure. It is recommended you do not drive for 24 hours after procedure and that someone stay with you for precautionary measures. 8. Cath lab will provide you with all post procedure instructions.           --------------------------------------------------------------------------------------------------------  ABLATION    If you have any question regarding your  ablation or would like to proceed with scheduling please contact Dr. Yunier Renteria at [phone].       Left Atrial Flutter Ablation Pre procedure Instructions    Date: 3-    Arrive at: 10:00 am    Procedure time: 11:30 am    The morning of your procedure you will park in the hospital parking lot and report directly to the cath lab to check in. At the information desk stay right and go all the way to the end of the bautista, this will take you directly to your check in desk for the cath lab. Pre-Procedure Instructions   1. You will need to fast (nothing to eat or drink) after midnight the day of your procedure  2. Do NOT chew gum or eat mints the day of your procedure. 3. You will need to hold your Flecainide for 7 days prior to the procedure. 4. You will need to hold your Xarelto for 12 hours prior to the procedure. 5. You will need to hold your Aspirin for 7 days prior to the procedure. 6. You may hold your Furosemide the morning of the procedure for comfort to decrease urinary urgency. 7. If you are a diabetic you will need to hold all diabetic medications including, Metformin the morning of the procedure. If you take Lantus/Levemir only take ½ your normal dose the evening before. 8. Do not use any lotions, creams or perfume the morning of procedure. 9. You will need to complete pre-procedure lab work 4-6 days prior to your procedure. 10. You will need to get a COVID test  4-6 days prior to your procedure, regardless of your vaccination status. You can get you COVID test at the [facility] (SEE BELOW). [facility] offer COVID testing at no cost. Please contact the location in which you would like to get the COVID testing completed to make sure it is one of the participating location. If you complete the testing at a location other than [facility] please bring results with you the day of your procedure. 11. Please have a responsible adult to drive you home after procedure, you should go home same day, but there is always a possibility of an overnight stay. 12. Cath lab will provide you with all post procedure instructions  13.  A 3 month follow up will be scheduled with Dr. Goncalves Older Nurse practitioner Keaton Worley CNP post procedure                                          [address] [facility] Lab [facility]  [address] [place] [address] Drive. [address]  Phone: [phone]  The hours are Mon -Fri. 6:30 am  4:00 pm   Saturday 8:00 am  noon  No appointment necessary    You may complete pre procedure labs & COVID test at this location. COVID TESTING CAN ONLY BE COMPLETED   Between hours of 12:00 pm & 4:00 pm  Monday through Friday  No Appointment necessary. There are parking spaces behind the [address] [place] [address] were you see Dr. Rian Nyhan designated for COVID test where you can pull up and call number listed on the pole and they will come out and complete covid test while you are in your car. Patient Education        Learning About Catheter Ablation for Heart Rhythm Problems  What is catheter ablation? Catheter ablation is a procedure that treats heart rhythm problems. These problems include atrial fibrillation, supraventricular tachycardia (SVT), atrial flutter, and ventricular tachycardia. Your heart should have a strong, steady beat. That beat is controlled by the heart's electrical system. Sometimes that system misfires. This causes a heartbeat that is too fast and isn't steady. Catheter ablation is a way to get into your heart and fix the problem. Ablation is not surgery. How is catheter ablation done? Your doctor inserts thin tubes called catheters into a blood vessel in your groin, arm, or neck. Then your doctor feeds them into the heart. Wires in the catheters help the doctor find the problem areas. Then the doctor uses the wires to send energy to destroy the tiny areas of heart tissue that are causing the problems. It may seem like a bad idea to destroy parts of your heart on purpose. But the areas that are destroyed are very tiny. They should not affect your heart's ability to do its job. You may be awake during the procedure. Or you may be asleep.  The doctor will give you medicines to help you feel relaxed and to numb the areas where the catheters go in. You may feel a little uncomfortable, but you should not feel pain. What can you expect after catheter ablation? You may stay overnight in the hospital. How long you stay in the hospital depends on the type of ablation you have. Do not exercise hard or lift anything heavy for a week. You will probably be able to go back to work and to your normal routine in 1 or 2 days. You may have swelling, bruising, or a small lump around the site where the catheters went into your body. These should go away in 3 to 4 weeks. You may have to take some medicines for a while. Follow-up care is a key part of your treatment and safety. Be sure to make and go to all appointments, and call your doctor if you are having problems. It's also a good idea to know your test results and keep a list of the medicines you take. Where can you learn more? Go to https://PairinpeVirtualScopics.365looks. org and sign in to your HESKA account. Enter L706 in the [employer] box to learn more about \"Learning About Catheter Ablation for Heart Rhythm Problems. \"     If you do not have an account, please click on the \"Sign Up Now\" link. Current as of: April 29, 2021               Content Version: 13.1  © 7844-5917 Healthwise, Incorporated. Care instructions adapted under license by [facility] ([facility]). If you have questions about a medical condition or this instruction, always ask your healthcare professional. [place] [address] any warranty or liability for your use of this information. Patient Education        Electrophysiology Study and Catheter Ablation: Before Your Procedure  What is an electrophysiology study and catheter ablation? An electrophysiology study is a test to see if there is a problem with your heart rhythm and to find out how to fix it. It is also called an EP study.  A catheter ablation procedure is sometimes done at the same time. This procedure destroys (ablates) small areas of your heart that are causing your heart rhythm problem. The doctor puts plastic tubes called catheters into blood vessels in your groin, arm, or neck. The doctor then uses an X-ray machine to guide long wires through the tubes to your heart. The doctor can use these wires to record your heart's electrical signals. If a problem with your heart can be fixed with ablation, the doctor can use the wires to destroy a small part of your heart tissue. This is most often done with radio waves. You may get medicine that relaxes you or puts you in a light sleep. Or you might be asleep during the procedure. The places where the catheters go in will be numb. An EP study and ablation can take 2 to 6 hours. In rare cases, it can take longer. If you have an EP study only and you don't need more treatment, you may go home the same day. But if you also have ablation, you may stay overnight in the hospital.  How do you prepare for the procedure? Procedures can be stressful. This information will help you understand what you can expect. And it will help you safely prepare for your procedure. Preparing for the procedure    · Be sure you have someone to take you home. Anesthesia and pain medicine will make it unsafe for you to drive or get home on your own.     · Understand exactly what procedure is planned, along with the risks, benefits, and other options.     · Tell your doctor ALL the medicines, vitamins, supplements, and herbal remedies you take. Some may increase the risk of problems during your procedure. Your doctor will tell you if you should stop taking any of them before the procedure and how soon to do it.     · If you take aspirin or some other blood thinner, ask your doctor if you should stop taking it before your procedure. Make sure that you understand exactly what your doctor wants you to do.  These medicines increase the risk of bleeding.     · Make sure your doctor and the hospital have a copy of your advance directive. If you don't have one, you may want to prepare one. It lets others know your health care wishes. It's a good thing to have before any type of surgery or procedure. What happens on the day of the procedure? · Follow the instructions exactly about when to stop eating and drinking. If you don't, your procedure may be canceled. If your doctor told you to take your medicines on the day of the procedure, take them with only a sip of water.     · Take a bath or shower before you come in for your procedure. Do not apply lotions, perfumes, deodorants, or nail polish.     · Take off all jewelry and piercings. And take out contact lenses, if you wear them. At the hospital or surgery center   · Bring a picture ID.     · You will be kept comfortable and safe by your anesthesia provider. You may get medicine that relaxes you or puts you in a light sleep. The area being worked on will be numb. Or the anesthesia may make you sleep.     · This procedure can take 2 to 6 hours. In rare cases, it can take longer.     · After the procedure, pressure may be applied to the areas where a catheter was put in a blood vessel. This will help prevent bleeding. A small device may also be used to close the blood vessel. You may have a bandage or a compression device on each catheter site.     · Nurses will check your heart rate and blood pressure. The nurse will also check the catheter site for bleeding.     · If the catheter was put in your groin, you will need to lie still and keep your leg straight for up to a few hours. The nurse may put a weighted bag on your leg to help you keep it still.     · If the catheter was put in your neck or arm, you may be able to sit up right away.  If it was in your arm, you will need to keep your arm still for at least 1 hour.     · You may have a bruise or a small lump where the catheter was put in your blood vessel. This is normal and will go away. When should you call your doctor? · You have questions or concerns.     · You don't understand how to prepare for your procedure.     · You become ill before the procedure (such as fever, flu, or a cold).     · You need to reschedule or have changed your mind about having the procedure. Where can you learn more? Go to https://SLIDpeQuantum.XPEC Entertainment. org and sign in to your FireDrillMe account. Enter D630 in the [employer] box to learn more about \"Electrophysiology Study and Catheter Ablation: Before Your Procedure. \"     If you do not have an account, please click on the \"Sign Up Now\" link. Current as of: April 29, 2021               Content Version: 13.1  © 1515-2239 Mirror[employer]. Care instructions adapted under license by [facility] ([facility]). If you have questions about a medical condition or this instruction, always ask your healthcare professional. [place] [address] any warranty or liability for your use of this information. Patient Education        Electrophysiology Study and Catheter Ablation: What to Expect at [address] [place] [address] Drive had an electrophysiology study for a problem with your heartbeat. You may also have had a catheter ablation to try to correct the problem. You may have swelling, bruising, or a small lump around the sites where the catheters went into your body. These should go away in 3 to 4 weeks. You can do light activities at home. Don't do anything strenuous until your doctor says it is okay. This may be for several days. This care sheet gives you a general idea about how long it will take for you to recover. But each person recovers at a different pace. Follow the steps below to get better as quickly as possible. How can you care for yourself at home? Activity    · If the doctor gave you a sedative:  ?  For 24 hours, don't do anything that requires attention to detail, such as going to work, making important decisions, or signing any legal documents. It takes time for the medicine's effects to completely wear off.  ? For your safety, do not drive or operate any machinery that could be dangerous. Wait until the medicine wears off and you can think clearly and react easily.     · Do not do strenuous exercise and do not lift, pull, or push anything heavy until your doctor says it is okay. This may be for several days.     · Ask your doctor when it is okay to have sex. Diet    · You can eat your normal diet. If your stomach is upset, try bland, low-fat foods like plain rice, broiled chicken, toast, and yogurt.     · Drink plenty of fluids (unless your doctor tells you not to). Medicines    · Your doctor will tell you if and when you can restart your medicines. You will also get instructions about taking any new medicines.     · If you take aspirin or some other blood thinner, ask your doctor if and when to start taking it again. Make sure that you understand exactly what your doctor wants you to do.     · Be safe with medicines. Take your medicines exactly as prescribed. Call your doctor if you think you are having a problem with your medicine. Catheter site care    · For 1 or 2 days, keep a bandage over the spot where the catheter was inserted. The bandage probably will fall off in this time.     · Put ice or a cold pack on the area for 10 to 20 minutes at a time to help with soreness or swelling. Put a thin cloth between the ice and your skin.     · You may shower 24 to 48 hours after the procedure, if your doctor okays it. Pat the incision dry.     · Do not soak the catheter site until it is healed. Don't take a bath for 1 week, or until your doctor tells you it is okay.     · Watch for bleeding from the site. A small amount of blood (up to the size of a quarter) on the bandage can be normal.     · If you are bleeding, lie down and press on the area for 15 minutes to try to make it stop. If the bleeding does not stop, call your doctor or seek immediate medical care. Follow-up care is a key part of your treatment and safety. Be sure to make and go to all appointments, and call your doctor if you are having problems. It's also a good idea to know your test results and keep a list of the medicines you take. When should you call for help? Call 911  anytime you think you may need emergency care. For example, call if:    · You passed out (lost consciousness).     · You have symptoms of a heart attack. These may include:  ? Chest pain or pressure, or a strange feeling in the chest.  ? Sweating. ? Shortness of breath. ? Nausea or vomiting. ? Pain, pressure, or a strange feeling in the back, neck, jaw, or upper belly, or in one or both shoulders or arms. ? Lightheadedness or sudden weakness. ? A fast or irregular heartbeat. After you call 911, the  may tell you to chew 1 adult-strength or 2 to 4 low-dose aspirin. Wait for an ambulance. Do not try to drive yourself.     · You have symptoms of a stroke. These may include:  ? Sudden numbness, tingling, weakness, or loss of movement in your face, arm, or leg, especially on only one side of your body. ? Sudden vision changes. ? Sudden trouble speaking. ? Sudden confusion or trouble understanding simple statements. ? Sudden problems with walking or balance. ? A sudden, severe headache that is different from past headaches. Call your doctor now or seek immediate medical care if:    · You are bleeding from the area where the catheter was put in your blood vessel.     · You have a fast-growing, painful lump at the catheter site.     · You have signs of infection, such as:  ? Increased pain, swelling, warmth, or redness. ? Red streaks leading from the catheter site. ? Pus draining from the catheter site. ? A fever.     · Your leg, arm, or hand is painful, looks blue, or feels cold, numb, or tingly.    Watch closely for any changes in your AeroScout, and be sure to contact your doctor if you have any problems. Where can you learn more? Go to https://chpepiceweb.My Best Interest. org and sign in to your [employer] account. Enter [phone] in the [facility] box to learn more about \"Electrophysiology Study and Catheter Ablation: What to Expect at Home. \"     If you do not have an account, please click on the \"Sign Up Now\" link. Current as of: April 29, 2021               Content Version: 13.1  © 4387-8242 Healthwise, Incorporated. Care instructions adapted under license by [facility] ([facility]). If you have questions about a medical condition or this instruction, always ask your healthcare professional. [address] any warranty or liability for your use of this information.

## 2022-02-04 ENCOUNTER — HOSPITAL ENCOUNTER (OUTPATIENT)
Dept: CARDIAC CATH/INVASIVE PROCEDURES | Age: 77
Discharge: HOME | End: 2022-02-04
Payer: MEDICARE

## 2022-02-04 ENCOUNTER — ANESTHESIA EVENT (OUTPATIENT)
Dept: CARDIAC CATH/INVASIVE PROCEDURES | Age: 77
End: 2022-02-04

## 2022-02-04 ENCOUNTER — ANESTHESIA (OUTPATIENT)
Dept: CARDIAC CATH/INVASIVE PROCEDURES | Age: 77
End: 2022-02-04

## 2022-02-04 VITALS
TEMPERATURE: 97.2 F | RESPIRATION RATE: 5 BRPM | SYSTOLIC BLOOD PRESSURE: 161 MMHG | OXYGEN SATURATION: 99 % | DIASTOLIC BLOOD PRESSURE: 72 MMHG

## 2022-02-04 VITALS
WEIGHT: 217 LBS | HEART RATE: 88 BPM | SYSTOLIC BLOOD PRESSURE: 155 MMHG | TEMPERATURE: 97.8 F | OXYGEN SATURATION: 100 % | RESPIRATION RATE: 26 BRPM | HEIGHT: 64 IN | BODY MASS INDEX: 37.05 KG/M2 | DIASTOLIC BLOOD PRESSURE: 88 MMHG

## 2022-02-04 LAB
ABO/RH: NORMAL
ANTIBODY SCREEN: NORMAL
HCT VFR BLD CALC: 40.2 % (ref 36–48)
HEMOGLOBIN: 13.2 G/DL (ref 12–16)
MCH RBC QN AUTO: 28.9 PG (ref 26–34)
MCHC RBC AUTO-ENTMCNC: 32.7 G/DL (ref 31–36)
MCV RBC AUTO: 88.4 FL (ref 80–100)
PDW BLD-RTO: 14.6 % (ref 12.4–15.4)
PLATELET # BLD: 256 K/UL (ref 135–450)
PMV BLD AUTO: 9 FL (ref 5–10.5)
POC ACT LR: 315 SEC
POC ACT LR: 355 SEC
RBC # BLD: 4.55 M/UL (ref 4–5.2)
SARS-COV-2, NAAT: NOT DETECTED
WBC # BLD: 11.4 K/UL (ref 4–11)

## 2022-02-04 PROCEDURE — 93623 PRGRMD STIMJ&PACG IV RX NFS: CPT

## 2022-02-04 PROCEDURE — 2500000003 HC RX 250 WO HCPCS

## 2022-02-04 PROCEDURE — 86900 BLOOD TYPING SEROLOGIC ABO: CPT

## 2022-02-04 PROCEDURE — 93657 TX L/R ATRIAL FIB ADDL: CPT | Performed by: INTERNAL MEDICINE

## 2022-02-04 PROCEDURE — 93657 TX L/R ATRIAL FIB ADDL: CPT

## 2022-02-04 PROCEDURE — C1893 INTRO/SHEATH, FIXED,NON-PEEL: HCPCS

## 2022-02-04 PROCEDURE — 93005 ELECTROCARDIOGRAM TRACING: CPT | Performed by: INTERNAL MEDICINE

## 2022-02-04 PROCEDURE — 2709999900 HC NON-CHARGEABLE SUPPLY

## 2022-02-04 PROCEDURE — 93656 COMPRE EP EVAL ABLTJ ATR FIB: CPT

## 2022-02-04 PROCEDURE — 93655 ICAR CATH ABLTJ DSCRT ARRHYT: CPT | Performed by: INTERNAL MEDICINE

## 2022-02-04 PROCEDURE — 93622 COMP EP EVAL L VENTR PAC&REC: CPT | Performed by: INTERNAL MEDICINE

## 2022-02-04 PROCEDURE — 93655 ICAR CATH ABLTJ DSCRT ARRHYT: CPT

## 2022-02-04 PROCEDURE — 6360000002 HC RX W HCPCS: Performed by: NURSE ANESTHETIST, CERTIFIED REGISTERED

## 2022-02-04 PROCEDURE — 93315 ECHO TRANSESOPHAGEAL: CPT

## 2022-02-04 PROCEDURE — 93312 ECHO TRANSESOPHAGEAL: CPT | Performed by: INTERNAL MEDICINE

## 2022-02-04 PROCEDURE — 2500000003 HC RX 250 WO HCPCS: Performed by: NURSE ANESTHETIST, CERTIFIED REGISTERED

## 2022-02-04 PROCEDURE — 7100000000 HC PACU RECOVERY - FIRST 15 MIN

## 2022-02-04 PROCEDURE — A4216 STERILE WATER/SALINE, 10 ML: HCPCS

## 2022-02-04 PROCEDURE — 93656 COMPRE EP EVAL ABLTJ ATR FIB: CPT | Performed by: INTERNAL MEDICINE

## 2022-02-04 PROCEDURE — 87635 SARS-COV-2 COVID-19 AMP PRB: CPT

## 2022-02-04 PROCEDURE — A4216 STERILE WATER/SALINE, 10 ML: HCPCS | Performed by: NURSE ANESTHETIST, CERTIFIED REGISTERED

## 2022-02-04 PROCEDURE — 86901 BLOOD TYPING SEROLOGIC RH(D): CPT

## 2022-02-04 PROCEDURE — C1730 CATH, EP, 19 OR FEW ELECT: HCPCS

## 2022-02-04 PROCEDURE — 2580000003 HC RX 258: Performed by: NURSE ANESTHETIST, CERTIFIED REGISTERED

## 2022-02-04 PROCEDURE — 3700000000 HC ANESTHESIA ATTENDED CARE

## 2022-02-04 PROCEDURE — 86850 RBC ANTIBODY SCREEN: CPT

## 2022-02-04 PROCEDURE — 85347 COAGULATION TIME ACTIVATED: CPT

## 2022-02-04 PROCEDURE — 93622 COMP EP EVAL L VENTR PAC&REC: CPT

## 2022-02-04 PROCEDURE — 2580000003 HC RX 258

## 2022-02-04 PROCEDURE — 7100000001 HC PACU RECOVERY - ADDTL 15 MIN

## 2022-02-04 PROCEDURE — C1759 CATH, INTRA ECHOCARDIOGRAPHY: HCPCS

## 2022-02-04 PROCEDURE — 85027 COMPLETE CBC AUTOMATED: CPT

## 2022-02-04 PROCEDURE — C1894 INTRO/SHEATH, NON-LASER: HCPCS

## 2022-02-04 PROCEDURE — 93623 PRGRMD STIMJ&PACG IV RX NFS: CPT | Performed by: INTERNAL MEDICINE

## 2022-02-04 PROCEDURE — 3700000001 HC ADD 15 MINUTES (ANESTHESIA)

## 2022-02-04 PROCEDURE — 6360000002 HC RX W HCPCS

## 2022-02-04 PROCEDURE — C1732 CATH, EP, DIAG/ABL, 3D/VECT: HCPCS

## 2022-02-04 RX ORDER — VECURONIUM BROMIDE 1 MG/ML
INJECTION, POWDER, LYOPHILIZED, FOR SOLUTION INTRAVENOUS PRN
Status: DISCONTINUED | OUTPATIENT
Start: 2022-02-04 | End: 2022-02-04 | Stop reason: SDUPTHER

## 2022-02-04 RX ORDER — BUDESONIDE AND FORMOTEROL FUMARATE DIHYDRATE 160; 4.5 UG/1; UG/1
2 AEROSOL RESPIRATORY (INHALATION) 2 TIMES DAILY
Status: DISCONTINUED | OUTPATIENT
Start: 2022-02-04 | End: 2022-02-05 | Stop reason: HOSPADM

## 2022-02-04 RX ORDER — FENTANYL CITRATE 50 UG/ML
50 INJECTION, SOLUTION INTRAMUSCULAR; INTRAVENOUS EVERY 5 MIN PRN
Status: DISCONTINUED | OUTPATIENT
Start: 2022-02-04 | End: 2022-02-04

## 2022-02-04 RX ORDER — ALBUTEROL SULFATE 90 UG/1
2 AEROSOL, METERED RESPIRATORY (INHALATION) EVERY 4 HOURS PRN
Status: DISCONTINUED | OUTPATIENT
Start: 2022-02-04 | End: 2022-02-05 | Stop reason: HOSPADM

## 2022-02-04 RX ORDER — SODIUM CHLORIDE 0.9 % (FLUSH) 0.9 %
5-40 SYRINGE (ML) INJECTION EVERY 12 HOURS SCHEDULED
Status: DISCONTINUED | OUTPATIENT
Start: 2022-02-04 | End: 2022-02-05 | Stop reason: HOSPADM

## 2022-02-04 RX ORDER — PHENYLEPHRINE HCL IN 0.9% NACL 1 MG/10 ML
SYRINGE (ML) INTRAVENOUS PRN
Status: DISCONTINUED | OUTPATIENT
Start: 2022-02-04 | End: 2022-02-04 | Stop reason: SDUPTHER

## 2022-02-04 RX ORDER — SODIUM CHLORIDE 9 MG/ML
INJECTION, SOLUTION INTRAVENOUS CONTINUOUS PRN
Status: DISCONTINUED | OUTPATIENT
Start: 2022-02-04 | End: 2022-02-04 | Stop reason: SDUPTHER

## 2022-02-04 RX ORDER — FENTANYL CITRATE 50 UG/ML
25 INJECTION, SOLUTION INTRAMUSCULAR; INTRAVENOUS EVERY 5 MIN PRN
Status: DISCONTINUED | OUTPATIENT
Start: 2022-02-04 | End: 2022-02-04

## 2022-02-04 RX ORDER — ACETAMINOPHEN 325 MG/1
650 TABLET ORAL EVERY 4 HOURS PRN
Status: DISCONTINUED | OUTPATIENT
Start: 2022-02-04 | End: 2022-02-05 | Stop reason: HOSPADM

## 2022-02-04 RX ORDER — METOPROLOL SUCCINATE 25 MG/1
25 TABLET, EXTENDED RELEASE ORAL DAILY
Qty: 30 TABLET | Refills: 3 | Status: SHIPPED | OUTPATIENT
Start: 2022-02-04 | End: 2022-07-08

## 2022-02-04 RX ORDER — ALBUTEROL SULFATE 2.5 MG/3ML
2.5 SOLUTION RESPIRATORY (INHALATION) EVERY 4 HOURS PRN
Status: DISCONTINUED | OUTPATIENT
Start: 2022-02-04 | End: 2022-02-05 | Stop reason: HOSPADM

## 2022-02-04 RX ORDER — DOBUTAMINE HYDROCHLORIDE 200 MG/100ML
INJECTION INTRAVENOUS CONTINUOUS PRN
Status: DISCONTINUED | OUTPATIENT
Start: 2022-02-04 | End: 2022-02-04 | Stop reason: SDUPTHER

## 2022-02-04 RX ORDER — FUROSEMIDE 40 MG/1
40 TABLET ORAL DAILY
Qty: 90 TABLET | Refills: 1 | Status: ON HOLD | OUTPATIENT
Start: 2022-02-05 | End: 2022-07-09

## 2022-02-04 RX ORDER — MEPERIDINE HYDROCHLORIDE 25 MG/ML
12.5 INJECTION INTRAMUSCULAR; INTRAVENOUS; SUBCUTANEOUS EVERY 5 MIN PRN
Status: DISCONTINUED | OUTPATIENT
Start: 2022-02-04 | End: 2022-02-04

## 2022-02-04 RX ORDER — FLECAINIDE ACETATE 50 MG/1
50 TABLET ORAL EVERY 12 HOURS SCHEDULED
Qty: 60 TABLET | Refills: 3 | Status: SHIPPED | OUTPATIENT
Start: 2022-02-04 | End: 2022-04-04

## 2022-02-04 RX ORDER — SUCCINYLCHOLINE/SOD CL,ISO/PF 200MG/10ML
SYRINGE (ML) INTRAVENOUS PRN
Status: DISCONTINUED | OUTPATIENT
Start: 2022-02-04 | End: 2022-02-04 | Stop reason: SDUPTHER

## 2022-02-04 RX ORDER — PROTAMINE SULFATE 10 MG/ML
INJECTION, SOLUTION INTRAVENOUS PRN
Status: DISCONTINUED | OUTPATIENT
Start: 2022-02-04 | End: 2022-02-04 | Stop reason: SDUPTHER

## 2022-02-04 RX ORDER — SODIUM CHLORIDE 9 MG/ML
INJECTION INTRAVENOUS PRN
Status: DISCONTINUED | OUTPATIENT
Start: 2022-02-04 | End: 2022-02-04 | Stop reason: SDUPTHER

## 2022-02-04 RX ORDER — DEXAMETHASONE SODIUM PHOSPHATE 4 MG/ML
INJECTION, SOLUTION INTRA-ARTICULAR; INTRALESIONAL; INTRAMUSCULAR; INTRAVENOUS; SOFT TISSUE PRN
Status: DISCONTINUED | OUTPATIENT
Start: 2022-02-04 | End: 2022-02-04 | Stop reason: SDUPTHER

## 2022-02-04 RX ORDER — PROPOFOL 10 MG/ML
INJECTION, EMULSION INTRAVENOUS PRN
Status: DISCONTINUED | OUTPATIENT
Start: 2022-02-04 | End: 2022-02-04 | Stop reason: SDUPTHER

## 2022-02-04 RX ORDER — METOPROLOL SUCCINATE 25 MG/1
25 TABLET, EXTENDED RELEASE ORAL DAILY
Status: DISCONTINUED | OUTPATIENT
Start: 2022-02-04 | End: 2022-02-05 | Stop reason: HOSPADM

## 2022-02-04 RX ORDER — FLECAINIDE ACETATE 100 MG/1
50 TABLET ORAL EVERY 12 HOURS SCHEDULED
Status: DISCONTINUED | OUTPATIENT
Start: 2022-02-04 | End: 2022-02-05 | Stop reason: HOSPADM

## 2022-02-04 RX ORDER — HEPARIN SODIUM 10000 [USP'U]/100ML
INJECTION, SOLUTION INTRAVENOUS CONTINUOUS PRN
Status: DISCONTINUED | OUTPATIENT
Start: 2022-02-04 | End: 2022-02-04 | Stop reason: SDUPTHER

## 2022-02-04 RX ORDER — PROTAMINE SULFATE 10 MG/ML
30 INJECTION, SOLUTION INTRAVENOUS
Status: ACTIVE | OUTPATIENT
Start: 2022-02-04 | End: 2022-02-04

## 2022-02-04 RX ORDER — SODIUM CHLORIDE 9 MG/ML
INJECTION, SOLUTION INTRAVENOUS CONTINUOUS
Status: DISCONTINUED | OUTPATIENT
Start: 2022-02-04 | End: 2022-02-04

## 2022-02-04 RX ORDER — FENTANYL CITRATE 50 UG/ML
INJECTION, SOLUTION INTRAMUSCULAR; INTRAVENOUS PRN
Status: DISCONTINUED | OUTPATIENT
Start: 2022-02-04 | End: 2022-02-04 | Stop reason: SDUPTHER

## 2022-02-04 RX ORDER — GLYCOPYRROLATE 0.2 MG/ML
INJECTION INTRAMUSCULAR; INTRAVENOUS PRN
Status: DISCONTINUED | OUTPATIENT
Start: 2022-02-04 | End: 2022-02-04 | Stop reason: SDUPTHER

## 2022-02-04 RX ORDER — SODIUM CHLORIDE 0.9 % (FLUSH) 0.9 %
5-40 SYRINGE (ML) INJECTION PRN
Status: DISCONTINUED | OUTPATIENT
Start: 2022-02-04 | End: 2022-02-05 | Stop reason: HOSPADM

## 2022-02-04 RX ORDER — MIDAZOLAM HYDROCHLORIDE 1 MG/ML
INJECTION INTRAMUSCULAR; INTRAVENOUS PRN
Status: DISCONTINUED | OUTPATIENT
Start: 2022-02-04 | End: 2022-02-04 | Stop reason: SDUPTHER

## 2022-02-04 RX ORDER — 0.9 % SODIUM CHLORIDE 0.9 %
1000 INTRAVENOUS SOLUTION INTRAVENOUS
Status: ACTIVE | OUTPATIENT
Start: 2022-02-04 | End: 2022-02-04

## 2022-02-04 RX ORDER — ISOSORBIDE MONONITRATE 30 MG/1
30 TABLET, EXTENDED RELEASE ORAL DAILY
Status: DISCONTINUED | OUTPATIENT
Start: 2022-02-04 | End: 2022-02-05 | Stop reason: HOSPADM

## 2022-02-04 RX ORDER — LIDOCAINE HYDROCHLORIDE 10 MG/ML
INJECTION, SOLUTION INFILTRATION; PERINEURAL PRN
Status: DISCONTINUED | OUTPATIENT
Start: 2022-02-04 | End: 2022-02-04 | Stop reason: SDUPTHER

## 2022-02-04 RX ORDER — SODIUM CHLORIDE 9 MG/ML
25 INJECTION, SOLUTION INTRAVENOUS PRN
Status: DISCONTINUED | OUTPATIENT
Start: 2022-02-04 | End: 2022-02-05 | Stop reason: HOSPADM

## 2022-02-04 RX ORDER — NIFEDIPINE 60 MG/1
60 TABLET, EXTENDED RELEASE ORAL DAILY
Status: DISCONTINUED | OUTPATIENT
Start: 2022-02-04 | End: 2022-02-05 | Stop reason: HOSPADM

## 2022-02-04 RX ORDER — LISINOPRIL 20 MG/1
20 TABLET ORAL DAILY
Status: DISCONTINUED | OUTPATIENT
Start: 2022-02-04 | End: 2022-02-05 | Stop reason: HOSPADM

## 2022-02-04 RX ORDER — LIDOCAINE HYDROCHLORIDE AND EPINEPHRINE BITARTRATE 20; .01 MG/ML; MG/ML
15 INJECTION, SOLUTION SUBCUTANEOUS SEE ADMIN INSTRUCTIONS
Status: DISCONTINUED | OUTPATIENT
Start: 2022-02-04 | End: 2022-02-05 | Stop reason: HOSPADM

## 2022-02-04 RX ORDER — FUROSEMIDE 40 MG/1
40 TABLET ORAL DAILY
Status: DISCONTINUED | OUTPATIENT
Start: 2022-02-05 | End: 2022-02-05 | Stop reason: HOSPADM

## 2022-02-04 RX ORDER — HEPARIN SODIUM 1000 [USP'U]/ML
INJECTION, SOLUTION INTRAVENOUS; SUBCUTANEOUS PRN
Status: DISCONTINUED | OUTPATIENT
Start: 2022-02-04 | End: 2022-02-04 | Stop reason: SDUPTHER

## 2022-02-04 RX ORDER — ONDANSETRON 2 MG/ML
4 INJECTION INTRAMUSCULAR; INTRAVENOUS
Status: DISCONTINUED | OUTPATIENT
Start: 2022-02-04 | End: 2022-02-04

## 2022-02-04 RX ORDER — FUROSEMIDE 10 MG/ML
INJECTION INTRAMUSCULAR; INTRAVENOUS PRN
Status: DISCONTINUED | OUTPATIENT
Start: 2022-02-04 | End: 2022-02-04 | Stop reason: SDUPTHER

## 2022-02-04 RX ORDER — ONDANSETRON 2 MG/ML
INJECTION INTRAMUSCULAR; INTRAVENOUS PRN
Status: DISCONTINUED | OUTPATIENT
Start: 2022-02-04 | End: 2022-02-04 | Stop reason: SDUPTHER

## 2022-02-04 RX ORDER — ROSUVASTATIN CALCIUM 20 MG/1
20 TABLET, COATED ORAL DAILY
Status: DISCONTINUED | OUTPATIENT
Start: 2022-02-04 | End: 2022-02-05 | Stop reason: HOSPADM

## 2022-02-04 RX ADMIN — PROPOFOL 100 MG: 10 INJECTION, EMULSION INTRAVENOUS at 11:52

## 2022-02-04 RX ADMIN — SODIUM CHLORIDE: 9 INJECTION, SOLUTION INTRAVENOUS at 11:57

## 2022-02-04 RX ADMIN — SODIUM CHLORIDE 10 ML: 9 INJECTION INTRAMUSCULAR; INTRAVENOUS; SUBCUTANEOUS at 11:55

## 2022-02-04 RX ADMIN — MIDAZOLAM 2 MG: 1 INJECTION INTRAMUSCULAR; INTRAVENOUS at 11:48

## 2022-02-04 RX ADMIN — FUROSEMIDE 60 MG: 10 INJECTION, SOLUTION INTRAMUSCULAR; INTRAVENOUS at 13:38

## 2022-02-04 RX ADMIN — DOBUTAMINE HYDROCHLORIDE 10 MCG/KG/MIN: 200 INJECTION INTRAVENOUS at 13:23

## 2022-02-04 RX ADMIN — GLYCOPYRROLATE 0.2 MG: 0.2 INJECTION, SOLUTION INTRAMUSCULAR; INTRAVENOUS at 11:57

## 2022-02-04 RX ADMIN — HEPARIN SODIUM 5000 UNITS: 1000 INJECTION INTRAVENOUS; SUBCUTANEOUS at 12:42

## 2022-02-04 RX ADMIN — FENTANYL CITRATE 100 MCG: 50 INJECTION INTRAMUSCULAR; INTRAVENOUS at 11:49

## 2022-02-04 RX ADMIN — SODIUM CHLORIDE: 9 INJECTION, SOLUTION INTRAVENOUS at 11:48

## 2022-02-04 RX ADMIN — LIDOCAINE HYDROCHLORIDE 50 MG: 10 INJECTION, SOLUTION INFILTRATION; PERINEURAL at 11:52

## 2022-02-04 RX ADMIN — Medication 300 MCG: at 12:39

## 2022-02-04 RX ADMIN — Medication 9000 UNITS/HR: at 13:03

## 2022-02-04 RX ADMIN — HEPARIN SODIUM 5000 UNITS: 1000 INJECTION INTRAVENOUS; SUBCUTANEOUS at 12:39

## 2022-02-04 RX ADMIN — SUGAMMADEX 200 MG: 100 INJECTION, SOLUTION INTRAVENOUS at 13:38

## 2022-02-04 RX ADMIN — Medication 120 MG: at 11:52

## 2022-02-04 RX ADMIN — DEXAMETHASONE SODIUM PHOSPHATE 8 MG: 4 INJECTION, SOLUTION INTRAMUSCULAR; INTRAVENOUS at 11:57

## 2022-02-04 RX ADMIN — VECURONIUM BROMIDE 10 MG: 1 INJECTION, POWDER, LYOPHILIZED, FOR SOLUTION INTRAVENOUS at 11:55

## 2022-02-04 RX ADMIN — ONDANSETRON 4 MG: 2 INJECTION INTRAMUSCULAR; INTRAVENOUS at 11:57

## 2022-02-04 RX ADMIN — PROTAMINE SULFATE 150 MG: 10 INJECTION, SOLUTION INTRAVENOUS at 13:38

## 2022-02-04 ASSESSMENT — PULMONARY FUNCTION TESTS
PIF_VALUE: 28
PIF_VALUE: 31
PIF_VALUE: 22
PIF_VALUE: 25
PIF_VALUE: 25
PIF_VALUE: 24
PIF_VALUE: 25
PIF_VALUE: 0
PIF_VALUE: 24
PIF_VALUE: 25
PIF_VALUE: 25
PIF_VALUE: 14
PIF_VALUE: 25
PIF_VALUE: 24
PIF_VALUE: 25
PIF_VALUE: 24
PIF_VALUE: 25
PIF_VALUE: 24
PIF_VALUE: 24
PIF_VALUE: 26
PIF_VALUE: 25
PIF_VALUE: 24
PIF_VALUE: 22
PIF_VALUE: 25
PIF_VALUE: 0
PIF_VALUE: 25
PIF_VALUE: 26
PIF_VALUE: 25
PIF_VALUE: 26
PIF_VALUE: 24
PIF_VALUE: 0
PIF_VALUE: 27
PIF_VALUE: 27
PIF_VALUE: 22
PIF_VALUE: 25
PIF_VALUE: 41
PIF_VALUE: 24
PIF_VALUE: 26
PIF_VALUE: 25
PIF_VALUE: 12
PIF_VALUE: 25
PIF_VALUE: 23
PIF_VALUE: 22
PIF_VALUE: 25
PIF_VALUE: 25
PIF_VALUE: 24
PIF_VALUE: 25
PIF_VALUE: 22
PIF_VALUE: 26
PIF_VALUE: 25
PIF_VALUE: 22
PIF_VALUE: 33
PIF_VALUE: 25
PIF_VALUE: 24
PIF_VALUE: 32
PIF_VALUE: 24
PIF_VALUE: 1
PIF_VALUE: 24
PIF_VALUE: 26
PIF_VALUE: 1
PIF_VALUE: 26
PIF_VALUE: 26
PIF_VALUE: 22
PIF_VALUE: 25
PIF_VALUE: 26
PIF_VALUE: 25
PIF_VALUE: 12
PIF_VALUE: 24
PIF_VALUE: 26
PIF_VALUE: 25
PIF_VALUE: 25
PIF_VALUE: 1
PIF_VALUE: 25
PIF_VALUE: 24
PIF_VALUE: 27
PIF_VALUE: 3
PIF_VALUE: 33
PIF_VALUE: 25
PIF_VALUE: 24
PIF_VALUE: 27
PIF_VALUE: 34
PIF_VALUE: 31
PIF_VALUE: 24
PIF_VALUE: 1
PIF_VALUE: 24
PIF_VALUE: 24
PIF_VALUE: 27
PIF_VALUE: 34
PIF_VALUE: 34
PIF_VALUE: 25
PIF_VALUE: 27
PIF_VALUE: 24
PIF_VALUE: 23
PIF_VALUE: 24
PIF_VALUE: 25
PIF_VALUE: 26
PIF_VALUE: 25
PIF_VALUE: 22
PIF_VALUE: 25
PIF_VALUE: 25
PIF_VALUE: 35
PIF_VALUE: 25
PIF_VALUE: 24
PIF_VALUE: 17
PIF_VALUE: 25
PIF_VALUE: 30
PIF_VALUE: 27
PIF_VALUE: 23
PIF_VALUE: 1
PIF_VALUE: 24
PIF_VALUE: 25
PIF_VALUE: 15
PIF_VALUE: 22
PIF_VALUE: 24
PIF_VALUE: 25
PIF_VALUE: 25
PIF_VALUE: 24
PIF_VALUE: 0
PIF_VALUE: 36
PIF_VALUE: 12
PIF_VALUE: 16
PIF_VALUE: 24
PIF_VALUE: 1

## 2022-02-04 ASSESSMENT — PAIN DESCRIPTION - ORIENTATION: ORIENTATION: RIGHT

## 2022-02-04 ASSESSMENT — PAIN SCALES - GENERAL
PAINLEVEL_OUTOF10: 3
PAINLEVEL_OUTOF10: 0

## 2022-02-04 ASSESSMENT — ENCOUNTER SYMPTOMS
SHORTNESS OF BREATH: 1
DYSPNEA ACTIVITY LEVEL: AFTER AMBULATING 1 FLIGHT OF STAIRS

## 2022-02-04 ASSESSMENT — COPD QUESTIONNAIRES: CAT_SEVERITY: SEVERE

## 2022-02-04 ASSESSMENT — PAIN - FUNCTIONAL ASSESSMENT: PAIN_FUNCTIONAL_ASSESSMENT: ACTIVITIES ARE NOT PREVENTED

## 2022-02-04 ASSESSMENT — PAIN DESCRIPTION - PAIN TYPE: TYPE: SURGICAL PAIN

## 2022-02-04 ASSESSMENT — PAIN DESCRIPTION - FREQUENCY: FREQUENCY: CONTINUOUS

## 2022-02-04 ASSESSMENT — PAIN DESCRIPTION - ONSET: ONSET: GRADUAL

## 2022-02-04 ASSESSMENT — PAIN DESCRIPTION - LOCATION: LOCATION: GROIN

## 2022-02-04 ASSESSMENT — PAIN DESCRIPTION - DESCRIPTORS: DESCRIPTORS: SORE

## 2022-02-04 ASSESSMENT — LIFESTYLE VARIABLES: SMOKING_STATUS: 0

## 2022-02-04 NOTE — H&P
Cardiac Electrophysiology Consultation   Date: 2/4/2022  Reason for Consultation: Atrial fibrillation / Left atrial flutter  Consult Requesting Physician:  Bryn Gaming MD  Primary Care Physician: Oscar Nuno MD     Chief Complaint:        Chief Complaint   Patient presents with    Follow-up       afib - SOB with exertion         HPI: Jame Spencer is a 77 y.o. patient with a history of CAD, atrial fibrillation, hypertension, aortic aneurysm, COPD, PE , DVT,chronic diastolic heart failure. She was seen in office on 9/14/2020 for initial consultation  to discuss treatment options for her atrial fibrillation. She reported that she was symptomatic with shortness of breath and palpitations. She made the decision to undergo ablation for treatment of her atrial fibrillation and on 10/7/2020 she underwent electrophysiology study with radiofrequency ablation of atrial fibrillation and pulmonary vein isolation. She was seen in office by Charan Cope CNP on 1/7/2021 for her 3 months post ablation follow up and EKG confirmed atrial fibrillation/atrial flutter rate controlled at 72 bpm. She subsequently underwent cardioversion to restore NSR on 1/12/2021. Then on 2/17/2021 she underwent electrophysiology study with radiofrequency ablation of left atrial flutter and pulmonary vein isolation. She was seen in office on 5/18/2021 by Charan Cope CNP for three month post ablation follow up and EKG showed SR. Flecainide was stopped and 30 day monitor was ordered to assess baseline rhythm apart from antiarrythmic medication. The monitor was worn from 5/19/2021-6/17/2021 and showed SR with several episodes of atrial runs no atrial fibrillation or atrial flutter seen.     Interval History: Today, she presents to office accompanied by her  for follow up for management of atrial fibrillation/ left atrial flutter.  She was seen in office on 1/25/2022 by Aman Rosa CNP and EKG showed atrial flutter rate controlled at 73 bpm. She states that he can not tell she is in atrial flutter. EKG today shows atrial flutter rate controlled at 86 bpm. She states she feels better today then she did last week as she felt \"very tired' last week. She reports chronic LI that she attributes to her COPD. She reports that she has not been sleeping well at night. She is compliant with hermedications and tolerating them well. She denies chest pain/pressure, tightness, edema, heart racing, palpitations, lightheadedness, dizziness, syncope, presyncope,  PND or orthopnea.      Past Medical History        Past Medical History:   Diagnosis Date    AAA (abdominal aortic aneurysm) ([facility] [address]       pt states it is 4cm    AAA (abdominal aortic aneurysm) without rupture ([address] 2/10/2015    Atrial fibrillation (HCC)      CAD (coronary artery disease)      CHF (congestive heart failure) (HCC)      COPD (chronic obstructive pulmonary disease) (HCC)      History of blood clots      Hyperlipidemia      Hypertension              Past Surgical History         Past Surgical History:   Procedure Laterality Date    ABDOMINAL AORTIC ANEURYSM REPAIR         Endovascular abdominal AA    APPENDECTOMY   1990     incidental    BLADDER SUSPENSION        CATARACT REMOVAL        CHOLECYSTECTOMY   10/15/13    COLONOSCOPY   9/2/07     dr Edwin Ladd and check in 5 years.  COLONOSCOPY   06/16/2017     ok dr arndt, repeat 5 years   [address]     for benign tumor. just the uterus    JOINT REPLACEMENT   12/2013     right knee replacement    TONSILLECTOMY   as a child    TUMOR EXCISION         benign behind right ear about 2008            Allergies: Allergies   Allergen Reactions    Morphine Rash       rash    Other Rash         Medication:   Home Medications           Prior to Admission medications    Medication Sig Start Date End Date Taking?  Authorizing Provider   lisinopril (PRINIVIL;ZESTRIL) 20 MG tablet Take 20 mg by mouth daily     Yes Historical Provider, MD   rosuvastatin (CRESTOR) 20 MG tablet Take 1 tablet by mouth daily 1/25/22   Yes CHRISTINE Hernandez CNP   budesonide-formoterol [facility]) 160-4.5 MCG/ACT AERO Inhale 2 puffs into the lungs 2 times daily 1/13/22   Yes Megan Rasmussen, DO   metoprolol succinate (TOPROL XL) 50 MG extended release tablet TAKE ONE TABLET BY MOUTH DAILY 12/30/21   Yes Geoffrey Heaton MD   traZODone (DESYREL) 50 MG tablet Take 1 tablet by mouth nightly as needed for Sleep 11/1/21   Yes Elizabeth Lanier MD   rivaroxaban (XARELTO) 20 MG TABS tablet TAKE ONE TABLET BY MOUTH DAILY WITH BREAKFAST 10/20/21   Yes Megan Rasmussen, DO   furosemide (LASIX) 40 MG tablet Take 1 tablet by mouth daily 10/14/21   Yes Elizabeth Lanier MD   clobetasol (TEMOVATE) 0.05 % cream Apply topically 2 times daily as needed for Rash Apply to rash between genital area and buttocks and around anus bid prn x up to 2 weeks -need at least 1 week break prior to restarting 8/31/21 8/31/22 Yes Historical Provider, MD   mupirocin (BACTROBAN) 2 % ointment Apply topically 3 times daily Apply to scabs three times daily for 10 days or until healed 8/31/21 8/31/22 Yes Historical Provider, MD   triamcinolone (KENALOG) 0.1 % ointment Apply topically 2 times daily as needed for Rash Apply to rash between genital area and buttocks and around anus bid prn during breaks from clobetasol 8/31/21 8/31/22 Yes Historical Provider, MD   isosorbide mononitrate (IMDUR) 30 MG extended release tablet TAKE ONE TABLET BY MOUTH DAILY  Patient taking differently: Take 30 mg by mouth daily  8/10/21   Yes Kathy Tovar MD   albuterol (PROVENTIL) (2.5 MG/3ML) 0.083% nebulizer solution Take 3 mLs by nebulization every 4 hours as needed for Wheezing 7/8/21   Yes CHRISTINE Stallworth CNP   NIFEdipine (ADALAT CC) 60 MG extended release tablet TAKE ONE TABLET BY MOUTH DAILY  Patient taking differently: Take 60 mg by mouth daily  3/2/21   Yes Kathy Tovar MD albuterol sulfate HFA (PROAIR HFA) 108 (90 Base) MCG/ACT inhaler Inhale 2 puffs into the lungs every 4 hours as needed for Wheezing or Shortness of Breath 7/23/20   Yes Goyo Sake, DO   nitroGLYCERIN (NITROSTAT) 0.4 MG SL tablet Place 1 tablet under the tongue every 5 minutes as needed for Chest pain 3/5/19   Yes Zay Carreon MD   Multiple Vitamins-Minerals (MULTI FOR HER 50+ PO) Take 1 tablet by mouth daily     Yes Historical Provider, MD            Social History:   reports that she quit smoking about 8 years ago. Her smoking use included cigarettes. She started smoking about 45 years ago. She has a 37.00 pack-year smoking history. She has never used smokeless tobacco. She reports that she does not drink alcohol and does not use drugs.         Family History:  family history includes Heart Disease in her father; Hypertension in an other family member. Reviewed. Denies family history of sudden cardiac death, arrhythmia, premature CAD     Review of System:     · General ROS: negative for - chills, fever   · Psychological ROS: negative for - anxiety or depression  · Ophthalmic ROS: negative for - eye pain or loss of vision  · ENT ROS: negative for - epistaxis, headaches, nasal discharge, sore throat   · Allergy and Immunology ROS: negative for - hives, nasal congestion   · Hematological and Lymphatic ROS: negative for - bleeding problems, blood clots, bruising or jaundice  · Endocrine ROS: negative for - skin changes, temperature intolerance or unexpected weight changes  · Respiratory ROS: negative for - shortness of breath, cough, hemoptysis, pleuritic pain, sputum changes or wheezing. · Cardiovascular ROS: Per HPI.    · Gastrointestinal ROS: negative for - abdominal pain, blood in stools, diarrhea, hematemesis, melena, nausea/vomiting or swallowing difficulty/pain  · Genito-Urinary ROS: negative for - dysuria or incontinence  · Musculoskeletal ROS: negative for - joint swelling or muscle pain  · Neurological ROS: negative for - confusion, dizziness, gait disturbance, headaches, numbness/tingling, seizures, speech problems, tremors, visual changes or weakness  · Dermatological ROS: negative for - rash     Physical Examination:      Vitals:     01/31/22 1308   BP: 136/78   Pulse: 108   SpO2: 97%         · Constitutional: Oriented. No distress. · Head: Normocephalic and atraumatic. · Mouth/Throat: Oropharynx is clear and moist.   · Eyes: Conjunctivae normal. EOM are normal.   · Neck: Normal range of motion. Neck supple. No rigidity. No JVD present. · Cardiovascular: Normal rate, regular rhythm, S1&S2 and intact distal pulses. · Pulmonary/Chest: Bilateral respiratory sounds. No wheezes. No rhonchi. · Abdominal: Soft. Bowel sounds present. No distension, No tenderness. · Musculoskeletal: No tenderness. No edema    · Lymphadenopathy: Has no cervical adenopathy. · Neurological: Alert and oriented. Cranial nerve appears intact, No Gross deficit   · Skin: Skin is warm and dry. No rash noted. · Psychiatric: Has a normal mood, affect and behavior      Labs:  Reviewed.      ECG: atrial flutter with v-rate of 70-80's bpm with QRS duration 92 ms. No pathologic Q waves, ventricular pre-excitation, or QT prolongation.     Studies:   1. Event monitor: 5/19/2021-6/17/2021   SR with several episodes of atrial runs no atrial fibrillation or atrial flutter seen.        2. Echo: 1/2/2020  There is moderate concentric left ventricular hypertrophy. Patient appears to be in atrial fibrillation. Ejection fraction is estimated to be 50-60%. Indeterminate diastolic function. Mild aortic regurgitation. Mild tricuspid regurgitation. Mild mitral regurgitation with mitral annular calcification     3. Nathaneil Harada 2/19/2018   Summary    Abnormal study.  There is a moderate sized, mild intensity, partially    reversible defect of the mid to apical anterior and mid anterolateral walls    which is consistent with ischemia. There is breast attenuation but stress    images appear worse.    Normal LV size and systolic function.    Overall findings represent an intermediate risk study         4. Cath: 3/5/2018  OVERALL IMPRESSION  1.  Again, 100% proximal right coronary artery occlusion with left to right collaterals. 2.  Mild disease of the distal left main trunk. 3.  20% to 30% ostial left anterior descending artery stenosis with collaterals from LAD to the right coronary artery. 4.  30% to 40% stenosis of the proximal circumflex artery. 5.  Normal left ventricular systolic function with estimated EF of 55% to 60%. 6.  Slightly enlarged aortic root with no evidence of aortic stenosis or regurgitation.     In view of the above findings, we will okay the patient for her upcoming  aortic aneurysm surgery from cardiac standpoint.     [facility]: ([facility]) 4/2017   of RCA chronic LAD 10-20% RA 4 PA24/9 16 wedge 9 LVEDP markedly elevated 30     Right Heart Cath 2/28/2020  1.  _____ normal right cardiac pressure. 2.  Elevated pulmonary capillary wedge pressure with a mean pulmonary  capillary wedge of 29 mmHg. 3.  Normal cardiac output and indices. 4.  No oxygen step off to suggest ASD/PFO.     6. Carotid US 10/2017 at [facility]:  1. Estimated diameter reduction of the right internal carotid artery is  less than 50%. 2.  Estimated diameter reduction of the left internal carotid artery is  50-69%. 3.  The bilateral common carotid arteries reveal no evidence of significant stenosis. 4.  There is greater than 50% stenosis of the right external carotid  artery. 5.  There is no evidence of significant stenosis in the left external  carotid artery. 6.  The bilateral vertebral arteries are patent with antegrade flow. 7.  The bilateral subclavian arteries reveal no evidence of significant  stenosis.   8.  There has been no significant change from the previous study of  4/21/2017.     I independently reviewed and interpreted the ECG, MCOT, echocardiogram, stress test, and coronary angiography/PCI results and used them for my plan of care.         Procedures:  1. Endovascular AAA repair on 3/21/2018 by Dr. Rosa Naik  2. Electrophysiology study with radiofrequency ablation of atrial fibrillation and pulmonary vein isolation 10/7/2020.   3. Successful external DC cardioversion of symptomatic, persistent atrial fibrillation 1/12/2021.  4. Electrophysiology study with radiofrequency ablation of left atrial flutter and pulmonary vein isolation 2/17/2021.        Assessment/Plan:      Atrial fibrillation / Left atrial flutter  -EKG today SR with PAC.    -She has a [id number] Score 3 (HTN, AGE, female gender)  -On Xarelto 20 mg daily for  thromboembolic risk reduction.  -Tolerating well no signs or symptoms of abnormal bruising or bleeding.   -Advised that I would recommend that she continue on [address] [place] [address]  for at least 3 years, at which time, if there are no further recurrences of atrial fibrillation, the likelihood of recurrences subsequent to that would be very low.  We will then, at that time, consider discontinuing the oral anticoagulant.     -s/p radiofrequency ablation of atrial fibrillation on 10/7/2020.     -At her three months post ablation follow up appointment EKG confirmed atrial fibrillation/atrial flutter rate controlled at 72 bpm. She subsequently underwent cardioversion to restored NSR on 1/12/2021.      -On 2/17/2021 she underwent electrophysiology study with radiofrequency ablation of left atrial flutter and pulmonary vein isolation.     -She wore MARILU from 5/19/2021-6/17/2021 which revealed SR with several episodes of atrial runs no atrial fibrillation or atrial flutter seen.     -Recurrence of left atrial flutter seen on EKG 1/25/2022 & today 1/31/2022.     - Treatment options for atrial fibrillation including cardioversion, anti-arrhythmic medication therapy, rate control strategy, oral anticoagulation, and atrial fibrillation ablation were discussed with patient. Risks, benefits and alternative of each treatment options (care, treatment, and services) were explained. The patient was also presented reasonable alternatives to the proposed care, treatment, and services. The discussion I have had with the patient encompassed risks, benefits, and side effects related to the alternatives and the risks related to not receiving the proposed care, treatment and services. All questions were answered.      -Recommend that she undergo CV to restore SR while awaiting ablation.  -Patient agreeable.         We educated the patient that left atrial flutter is a worsening and progressive disease, with more frequent episodes that will ensue. Subsequent episodes usually become more sustained to the extent that many individuals would then develop persistent left atrial flutter. Once persistence is reached, permanent left atrial flutter is inevitable. We also discussed the fact that left atrial flutter is associated with stroke, including life-threatening stroke, and therefore oral anticoagulation is warranted depending on the patient's [id number] score.     We discussed different management options for left atrial flutter including their risks and benefits. These options include use of cardioversion which provides an effective immediate therapy with success rates of 90% or higher, but it provides no short nor long term efficacy. Anti-arrhythmic medications has been very difficult to control left atrial flutter, both in regards to heart rate and rhythm control even with a powerful anti-arrhythmic medication (amiodarone). Left atrial flutter ablation is a potentially curative therapy with very reasonable success rate.      The risks, benefits and alternatives of the left atrial flutter ablation procedure were discussed with the patient.  The risks including, but not limited to, bleeding, infection, radiation exposure, injury to vascular, cardiac and surrounding structures (including pneumothorax), stroke, cardiac perforation, tamponade, need for emergent open heart surgery, need for pacemaker implantation, injury to the phrenic nerve, injury to the esophagus, myocardial infarction and death were discussed in detail. The patient was also counseled at length about the risks of abhay Covid-19 in the niesha-operative and post-operative states including the recovery window of their procedure. The patient was made aware that abhay Covid-19 after a surgical procedure may worsen their prognosis for recovering from the virus and lend to a higher morbidity and or mortality risk. The patient was given the option of postponing their procedure. The patient was also presented reasonable alternatives to the proposed care, treatment, and services. The discussion I have had with the patient encompassed risks, benefits, and side effects related to the alternatives and the risks related to not receiving the proposed care, treatment and services.      I spent 40 minutes face to face with the patient, with greater than 50% of that time spent in counseling on the above.     The patient opted to proceed with the left atrial flutter ablation. We will schedule for a radiofrequency ablation with [employer] system with a KAYE procedure immediately prior to this ablation. We will hold Xarelto  for 12 hours prior to procedure. We will order BMP, CBC, PT/INR, and Type & Screen prior to the procedure.      Much time was spent counseling the patient on healthier lifestyle, following a low sodium diet <2,000 mg of sodium a day and dramatically decreasing the intake of processed sugar.        Chronic diastolic heart failure  -NYHA class II-III.   -LVEF 60 %   -Per Echo 9/10/2021, grade II diastolic dysfunction.  -On guideline directed medical therapy.              Beta Blocker: Toprol  XL               Aldosterone Antagonist: Not on due to elevated creatine                     Diuretic: Torsemide              ARB/ACE/ARNI: Not on due to elevated creatine  -Euvolemic on today's exam.  -She follows with Dr. Hilton Hammond for management.        Coronary artery disease involving native coronary artery of native heart without angina pectoris  -Denies angina.   -Continue with medical management and risk factor modification including statin, and beta blocker.     Hypertension  Stable     AAA (abdominal aortic aneurysm) without rupture   Underwent endovascular AAA repair on 3/21/18 by Dr. Paul Lanza with vascular surgery.     Chronic obstructive pulmonary disease   Chronic shortness of breath. Centrilobular emphysema noted on CT scan. Managed by Dr. Zi Mcbride MD.        Continue routine follow up with Dr. Hilton Hammond.     Thank you for allowing me to participate in the care of Norma Roth. All questions and concerns were addressed to the patient/family. Alternatives to my treatment were discussed.      I have reviewed the history and physical and examined the patient and find no relevant changes. I have reviewed with the patient and/or family the risks and benefits to the proposed procedure. The patient was presented with the option of postponing the proposed procedure. The patient was also presented reasonable alternatives to the proposed care, treatment, and services.  The discussion I have had with the patient encompassed risks, benefits, and side effects related to the alternatives and the risks related to not receiving the proposed care, treatment and services.      Eron Christensen MD, [address], [facility], [facility] Electrophysiology  [address]  [address] [facility], [address] [facility] [address]  [phone]

## 2022-02-04 NOTE — ANESTHESIA POSTPROCEDURE EVALUATION
[facility] Department of Anesthesiology  Post-Anesthesia Note       Name:  Franky Randolph                                  Age:  69 y.o. MRN:  [id number]     Last Vitals & Oxygen Saturation: BP (!) 155/88   Pulse 88   Temp 97.8 °F (36.6 °C) (Temporal)   Resp 26   Ht 5' 4\" (1.626 m)   Wt 217 lb (98.4 kg)   SpO2 100%   BMI 37.25 kg/m²   Patient Vitals for the past 4 hrs:   BP Temp Temp src Pulse Resp SpO2   02/04/22 1416 (!) 155/88   88 26 100 %   02/04/22 1415 (!) 155/88   89 21 100 %   02/04/22 1410 108/66   97 26 99 %   02/04/22 1406    101 25 98 %   02/04/22 1405    94 24 99 %   02/04/22 1404    96 23 98 %   02/04/22 1403    94 22 98 %   02/04/22 1402 (!) 178/83   94 21 98 %   02/04/22 1400 (!) 163/94   96 15 98 %   02/04/22 1358  97.8 °F (36.6 °C) Temporal          Level of consciousness:  Awake, alert    Respiratory: Respirations easy, no distress. Stable. Cardiovascular: Hemodynamically stable. Hydration: Adequate. PONV: Adequately managed. Post-op pain: Adequately controlled. Post-op assessment: Tolerated anesthetic well without complication. Complications:  None.     Lorri Alaniz MD  February 4, 2022   2:29 PM

## 2022-02-04 NOTE — ANESTHESIA PRE PROCEDURE
Department of Anesthesiology  Preprocedure Note       Name:  Laine Hubbard   Age:  69 y.o.  :  1945                                          MRN:  [id number]         Date:  2022      Surgeon: * No surgeons listed *    Procedure:     Medications prior to admission:   Prior to Admission medications    Medication Sig Start Date End Date Taking?  Authorizing Provider   lisinopril (PRINIVIL;ZESTRIL) 20 MG tablet Take 20 mg by mouth daily    Historical Provider, MD   rosuvastatin (CRESTOR) 20 MG tablet Take 1 tablet by mouth daily 22   CHRISTINE Dolan - FRANNY   budesonide-formoterol [facility]) 160-4.5 MCG/ACT AERO Inhale 2 puffs into the lungs 2 times daily 22   Samina Whatley DO   metoprolol succinate (TOPROL XL) 50 MG extended release tablet TAKE ONE TABLET BY MOUTH DAILY 21   Sheng Wade MD   traZODone (DESYREL) 50 MG tablet Take 1 tablet by mouth nightly as needed for Sleep 21   Yasmin Fried MD   rivaroxaban (XARELTO) 20 MG TABS tablet TAKE ONE TABLET BY MOUTH DAILY WITH BREAKFAST 10/20/21   Samina Whatley DO   furosemide (LASIX) 40 MG tablet Take 1 tablet by mouth daily 10/14/21   Yasmin Fried MD   clobetasol (TEMOVATE) 0.05 % cream Apply topically 2 times daily as needed for Rash Apply to rash between genital area and buttocks and around anus bid prn x up to 2 weeks -need at least 1 week break prior to restarting 21  Historical Provider, MD   mupirocin (BACTROBAN) 2 % ointment Apply topically 3 times daily Apply to scabs three times daily for 10 days or until healed 21  Historical Provider, MD   triamcinolone (KENALOG) 0.1 % ointment Apply topically 2 times daily as needed for Rash Apply to rash between genital area and buttocks and around anus bid prn during breaks from clobetasol 21  Historical Provider, MD   isosorbide mononitrate (IMDUR) 30 MG extended release tablet TAKE ONE TABLET BY MOUTH DAILY  Patient taking differently: Take 30 mg by mouth daily  8/10/21   Merced Juarez MD   albuterol (PROVENTIL) (2.5 MG/3ML) 0.083% nebulizer solution Take 3 mLs by nebulization every 4 hours as needed for Wheezing 7/8/21   Zeynep Daley APRN - CNP   NIFEdipine (ADALAT CC) 60 MG extended release tablet TAKE ONE TABLET BY MOUTH DAILY  Patient taking differently: Take 60 mg by mouth daily  3/2/21   Merced Juarez MD   albuterol sulfate HFA (PROAIR HFA) 108 (90 Base) MCG/ACT inhaler Inhale 2 puffs into the lungs every 4 hours as needed for Wheezing or Shortness of Breath 7/23/20   Cade Roche DO   nitroGLYCERIN (NITROSTAT) 0.4 MG SL tablet Place 1 tablet under the tongue every 5 minutes as needed for Chest pain 3/5/19   Shirley Colbert MD   Multiple Vitamins-Minerals (MULTI FOR HER 50+ PO) Take 1 tablet by mouth daily    Historical Provider, MD       Current medications:    Current Outpatient Medications   Medication Sig Dispense Refill    lisinopril (PRINIVIL;ZESTRIL) 20 MG tablet Take 20 mg by mouth daily      rosuvastatin (CRESTOR) 20 MG tablet Take 1 tablet by mouth daily 90 tablet 2    budesonide-formoterol (SYMBICORT) 160-4.5 MCG/ACT AERO Inhale 2 puffs into the lungs 2 times daily 1 each 5    metoprolol succinate (TOPROL XL) 50 MG extended release tablet TAKE ONE TABLET BY MOUTH DAILY 60 tablet 0    traZODone (DESYREL) 50 MG tablet Take 1 tablet by mouth nightly as needed for Sleep 30 tablet 1    rivaroxaban (XARELTO) 20 MG TABS tablet TAKE ONE TABLET BY MOUTH DAILY WITH BREAKFAST 30 tablet 11    furosemide (LASIX) 40 MG tablet Take 1 tablet by mouth daily 90 tablet 1    clobetasol (TEMOVATE) 0.05 % cream Apply topically 2 times daily as needed for Rash Apply to rash between genital area and buttocks and around anus bid prn x up to 2 weeks -need at least 1 week break prior to restarting      mupirocin (BACTROBAN) 2 % ointment Apply topically 3 times daily Apply to scabs three times daily for 10 days or until healed      triamcinolone (KENALOG) 0.1 % ointment Apply topically 2 times daily as needed for Rash Apply to rash between genital area and buttocks and around anus bid prn during breaks from clobetasol      isosorbide mononitrate (IMDUR) 30 MG extended release tablet TAKE ONE TABLET BY MOUTH DAILY (Patient taking differently: Take 30 mg by mouth daily ) 90 tablet 4    albuterol (PROVENTIL) (2.5 MG/3ML) 0.083% nebulizer solution Take 3 mLs by nebulization every 4 hours as needed for Wheezing 540 mL 2    NIFEdipine (ADALAT CC) 60 MG extended release tablet TAKE ONE TABLET BY MOUTH DAILY (Patient taking differently: Take 60 mg by mouth daily ) 90 tablet 3    albuterol sulfate HFA (PROAIR HFA) 108 (90 Base) MCG/ACT inhaler Inhale 2 puffs into the lungs every 4 hours as needed for Wheezing or Shortness of Breath 1 Inhaler 11    nitroGLYCERIN (NITROSTAT) 0.4 MG SL tablet Place 1 tablet under the tongue every 5 minutes as needed for Chest pain 25 tablet 1    Multiple Vitamins-Minerals (MULTI FOR HER 50+ PO) Take 1 tablet by mouth daily       No current facility-administered medications for this visit. Facility-Administered Medications Ordered in Other Visits   Medication Dose Route Frequency Provider Last Rate Last Admin    0.9 % sodium chloride infusion   IntraVENous Continuous Kasi Alcala MD        sodium chloride flush 0.9 % injection 5-40 mL  5-40 mL IntraVENous 2 times per day Kasi Alcala MD        sodium chloride flush 0.9 % injection 5-40 mL  5-40 mL IntraVENous PRN Kasi Alcala MD        0.9 % sodium chloride infusion  25 mL IntraVENous PRN Kasi Alcala MD           Allergies:     Allergies   Allergen Reactions    Morphine Rash     rash    Other Rash       Problem List:    Patient Active Problem List   Diagnosis Code    Essential hypertension I10    Hyperlipidemia E78.5    Coronary artery disease I25.10    Sleep apnea G47.30    History of DVT (deep vein thrombosis) Z86.717    Family history of DVT Z82.49    AAA (abdominal aortic aneurysm) without rupture ([facility]) I71.4    Pleural effusion, left J90    Pleural effusion J90    PE (pulmonary thromboembolism) ([facility]) I26.99    COPD exacerbation ([facility]) J44.1    Pelvic pain in female R10.2    Vitamin D deficiency E55.9    Other emphysema ([facility]) J43.8    Acute bronchitis J20.9    Acute respiratory failure with hypoxia ([facility]) J96.01    Abnormal chest x-ray R93.89    Atypical pneumonia J18.9    Atrial fibrillation with RVR ([facility]) I48.91    Acute on chronic diastolic heart failure ([facility]) I50.33    PVD (peripheral vascular disease) ([facility]) I73.9    Abnormal nuclear stress test R94.39    AAA (abdominal aortic aneurysm) ([facility]) I71.4    PAF (paroxysmal atrial fibrillation) ([facility]) I48.0    Endoleak post (EVAR) endovascular aneurysm repair, 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    Carotid artery stenosis without cerebral infarction, bilateral I65.23    History of repair of aneurysm of abdominal aorta using endovascular stent graft Z95.828    Atherosclerotic heart disease of native coronary artery with other forms of angina pectoris ([facility]) I25.118    Morbidly obese ([facility]) E66.01    COPD, severe ([facility]) J44.9    Hypertensive crisis I16.9    Acute on chronic congestive heart failure ([facility]) I50.9    Respiratory failure ([facility]) J96.90    Left atrial flutter by electrocardiogram ([facility]) I48.92    Persistent atrial fibrillation ([facility]) I48.19    A-fib ([facility]) I48.91    Obesity (BMI 30-39. 9) E66.9       Past Medical History:        Diagnosis Date    AAA (abdominal aortic aneurysm) ([facility] [address]     pt states it is 4cm    AAA (abdominal aortic aneurysm) without rupture ([facility]) 2/10/2015    Atrial fibrillation ([facility])     CAD (coronary artery disease)     CHF (congestive heart failure) ([facility])     COPD (chronic obstructive pulmonary disease) ([facility])     History of blood clots     Hyperlipidemia     Hypertension        Past Surgical History:        Procedure Laterality Date    ABDOMINAL AORTIC ANEURYSM REPAIR      Endovascular abdominal AA    APPENDECTOMY      incidental    BLADDER SUSPENSION      CATARACT REMOVAL      CHOLECYSTECTOMY  10/15/13    COLONOSCOPY  07    dr Weir Dear and check in 5 years.  COLONOSCOPY  2017    ok dr arndt, repeat 5 years   [address]    for benign tumor. just the uterus    JOINT REPLACEMENT  2013    right knee replacement    TONSILLECTOMY  as a child    TUMOR EXCISION      benign behind right ear about        Social History:    Social History     Tobacco Use    Smoking status: Former Smoker     Packs/day: 1.00     Years: 37.00     Pack years: 37.00     Types: Cigarettes     Start date: 1976     Quit date: 2013     Years since quittin.3    Smokeless tobacco: Never Used    Tobacco comment: E cigarette now and then   Substance Use Topics    Alcohol use: No                                Counseling given: Not Answered  Comment: E cigarette now and then      Vital Signs (Current): There were no vitals filed for this visit.                                            BP Readings from Last 3 Encounters:   22 (!) 153/80   22 136/78   22 138/82       NPO Status:                                                                                 BMI:   Wt Readings from Last 3 Encounters:   22 217 lb (98.4 kg)   22 219 lb (99.3 kg)   22 218 lb (98.9 kg)     There is no height or weight on file to calculate BMI.    CBC:   Lab Results   Component Value Date    WBC 11.4 2022    RBC 4.55 2022    RBC 4.19 2017    HGB 13.2 2022    HCT 40.2 2022    MCV 88.4 2022    RDW 14.6 2022     2022       CMP:   Lab Results   Component Value Date     2022    K 4.5 2022    K 4.2 2021     2022    CO2 26 2022    BUN 38 2022    CREATININE 1.4 2022    GFRAA 44 2022    GFRAA >60 2012    AGRATIO 1.6 09/09/2021    LABGLOM 37 01/28/2022    GLUCOSE 85 01/28/2022    GLUCOSE 102 07/14/2016    PROT 6.7 09/09/2021    PROT 7.9 07/14/2016    CALCIUM 10.0 01/28/2022    BILITOT 0.5 09/09/2021    ALKPHOS 92 09/09/2021    AST 17 09/09/2021    ALT 12 09/09/2021       POC Tests: No results for input(s): POCGLU, POCNA, POCK, POCCL, POCBUN, POCHEMO, POCHCT in the last 72 hours.     Coags:   Lab Results   Component Value Date    PROTIME 26.0 02/15/2020    INR 2.22 02/15/2020    APTT 37.2 02/15/2020       HCG (If Applicable): No results found for: PREGTESTUR, PREGSERUM, HCG, HCGQUANT     ABGs:   Lab Results   Component Value Date    PHART 7.399 06/04/2015    PO2ART 78.5 06/04/2015    BNJ4LCB 42.9 06/04/2015    LEL0LXN 26.0 06/04/2015    BEART 1.5 06/04/2015    G6MOOKSO 96.0 06/04/2015        Type & Screen (If Applicable):  No results found for: LABABO, LABRH    Drug/Infectious Status (If Applicable):  No results found for: HIV, HEPCAB    COVID-19 Screening (If Applicable):   Lab Results   Component Value Date    COVID19 Not Detected 02/04/2022    COVID19 NOT DETECTED 10/05/2020         Anesthesia Evaluation  Patient summary reviewed and Nursing notes reviewed no history of anesthetic complications:   Airway: Mallampati: II  TM distance: >3 FB   Neck ROM: full  Mouth opening: > = 3 FB Dental:    (+) upper dentures, lower dentures and edentulous      Pulmonary: breath sounds clear to auscultation  (+) COPD (severe): severe,  shortness of breath: chronic,  sleep apnea:      (-) pneumonia, recent URI and not a current smoker                           Cardiovascular:  Exercise tolerance: poor (<4 METS),   (+) hypertension:, angina:, CAD:, dysrhythmias: atrial fibrillation, CHF:, LI: after ambulating 1 flight of stairs, hyperlipidemia    (-) pacemaker, valvular problems/murmurs, past MI, orthopnea, PND and no pulmonary hypertension      Rhythm: irregular                      Neuro/Psych:      (-) seizures, neuromuscular disease, TIA, CVA, headaches, psychiatric history and depression/anxiety            GI/Hepatic/Renal:        (-) hiatal hernia, GERD, PUD, hepatitis, liver disease, no renal disease, bowel prep and no morbid obesity      ROS comment: obesity. Endo/Other:    (+) blood dyscrasia (xarelto): anticoagulation therapy, arthritis:., .    (-) diabetes mellitus, hypothyroidism, hyperthyroidism               Abdominal:             Vascular:   + PVD, aortic or cerebral (AAA s/p repair, then with endoleak), DVT, PE. Other Findings:               Anesthesia Plan      general     ASA 3       Induction: intravenous. MIPS: Prophylactic antiemetics administered. Anesthetic plan and risks discussed with patient and spouse. Plan discussed with CRNA. Vashti Vitale MD   2/4/2022      This pre-anesthesia assessment may be used as a history and physical.    DOS STAFF ADDENDUM:    Pt seen and examined, chart reviewed (including anesthesia, drug and allergy history). No interval changes to history and physical examination. Anesthetic plan, risks, benefits, alternatives, and personnel involved discussed with patient. Patient verbalized an understanding and agrees to proceed.       Vashti Vitale MD  February 4, 2022  10:36 AM

## 2022-02-04 NOTE — PROCEDURES
[address]     Electrophysiology Procedure Note       Date of Procedure: 2/4/2022  Patient's Name: Lannette Boeck  YOB: 1945   Medical Record Number: [id number]  Referring Physician: Meche Echevarria MD  Procedure Performed by: June Rod MD    Procedure performed:  · Electrophysiology study with radiofrequency ablation of atrial fibrillation and pulmonary vein isolation   · Ablation of roof-dependent left atrial tachycardia with  ms with proximal-distal activation  · Ablation of mitral isthmus dependent left atrial flutter with  ms with proximal-distal activation  · Additional ablation with creation of a roof line (for roof-dependent left atrial tachycardia), anterior line from mitral annulus to the roof (for mitral isthmus dependent left atrial flutter)  · Additional ablation of complex fractionated atrial electrograms (for atrial fibrillation) on the LA septal wall  · Additional ablation of complex fractionated atrial electrograms (for atrial fibrillation) on the anterior base of the LA appendage  · 3-D electroanatomical mapping of the left atrium    · Transseptal blunt puncture through a patent foramen ovale x 2 under intracardiac ultrasound guidance without fluoroscopic guidance   · Intracardiac echocardiography  · Left ventricular pacing and recording  · Drug infusion with an attempt to induce atrial tachydysrhythmia  · Transesophageal echocardiogram  · Anesthesia: General anesthesia provided by the Anesthesia service    Indications for procedure:    Lannette Boeck is a 69 y.o. female who has a history of persistent atrial fibrillation s/p afib ablation in 2020 and 2021 with recurrence of left atrial flutter who is symptomatic with symptoms of dyspnea with minimal exertion and fatigue who has failed antiarrhythmic therapy in the past is now here for an ablation for left atrial flutter. Details of Procedure:    The risks, benefits and alternatives of the ablation procedure were discussed with the patient. The risks including, but not limited to, the risks of bleeding, infection, radiation exposure, injury to vascular, cardiac and surrounding structures (including pneumothorax), stroke, cardiac perforation, tamponade, need for emergent open heart surgery, need for pacemaker implantation, esophageal injury and fistula, myocardial infarction and death were discussed in detail. The patient was also counseled at length about the risks of abhay Covid-19 in the niesha-operative and post-operative states including the recovery window of their procedure. The patient was made aware that abhay Covid-19 after a surgical procedure may worsen their prognosis for recovering from the virus and lend to a higher morbidity and or mortality risk. The patient was given the option of postponing their procedure. The patient was also presented reasonable alternatives to the proposed care, treatment, and services. The discussion I have had with the patient encompassed risks, benefits, and side effects related to the alternatives and the risks related to not receiving the proposed care, treatment and services. The patient opted to proceed with the ablation. Written informed consent was signed and placed in the chart. Patient was brought to the EP lab in a fasting non-sedate state. Patient underwent general anesthesia by anesthesia team. The patient was monitored continuously with ECG, pulse oximetry, blood pressure monitoring, and direct observation. No urinary woodruff was placed in order to prevent any hematuria or urinary tract infection. Both groins were prepped in a sterile fashion. We gained access in the right femoral vein. One 8 Green[place]ic short sheath for ICE and subsequently for CS catheters were placed in the right femoral vein using modified seldinger technique. Two 8.5F SLO sheaths were introduced into the right femoral vein using modified Seldinger technique.  Then a CS cathter was placed inside the coronary sinus without fluoroscopy but with 3-D electroanatomic mapping for recording and mapping of the left atrium. Using ICE we delineated the left pulmonary vein, left atrial appendage,  mitral valve, and right superior and right inferior pulmonary veins, and the trivial amount of pericardial effusion prior to ablation. Two transeptal punctures were attempted but we found a patent foramen ovale through which, with blunt perforation using the mapping catheter, performed under intracardiac echocardiogram, pressure monitoring, and without fluoroscopic guidance, we entered the LA. Patient received a bolus of heparin shortly after each transseptal puncture followed by continuous monitoring of the ACT every 15-30 minutes, and additional boluses of heparin during the procedure to keep the ACT between 300-400 sec. We placed both SLO sheaths inside the left atrium. Also an esophageal temperature probe ([employer] Temperature Probe 12Fr) that was tied with sutures to an accompanying [employer] quadripolar 6 Italian 5-5-5 electrode spacing catheter was advanced into the esophagus for real-time mapping of the esophagus and careful monitoring of the esophageal temperature during ablation. Using the Penta ray cathter and Carto navigation system a three dimensional electro anatomical mapping of the left atrium, in addition to right and left sided pulmonary vein anatomy was created. During left atrial flutter rhythm with TCL 269ms with proximal-distal activation, we found that all four pulmonary veins (left superior, left inferior, right superior, and right inferior) did NOT have PV fascicles, the triggers for the atrial fibrillation. The antrums of the L and R set of PV's were also electrically isolated. The roof appeared to not be electrically blocked, and the anterior line also appeared to not be electrically blocked.     We then performed activation mapping, and found that the entire CL was in the LA. The early-meets late appeared to be on the lateral aspect of the LA, diagnosing a counter-clockwise mitral-isthmus dependent left atrial flutter. Given the mitral isthmus dependent left atrial flutter, we decided to ablate an anterior line from the mitral annulus to the R antrum. This did not perturb the tachycardia. We then performed another linear line from the mitral annulus to the roof, and this also did not perturb the left atrial flutter. We then performed a linear ablation along the roof of the LA from the L antrum to the R antrum, extending slightly down the L and R antrums bilaterally and thus performing antral ablations along the anterior portion of the L and R antrum. As we were ablating the roof, the left atrial flutter converted to a left atrial tachycardia arising from the roof with TCL 378ms with proximal-distal activation. With the completion of the roof linear ablation, the left atrial tachycardia terminated to sinus rhythm for the remainder of the procedure. We then evaluated the left atrium for complex fractionated atrial electrogram, a separate mechanism that would initiate and/or perpetuate atrial fibrillation apart from the pulmonary vein fascicles and antral ablations. We found 2 site(s) on the septal aspect of the LA and ablated these foci. We then evaluated the base of the LA appendage for complex fractionated atrial electrogram, a seprate mechanism that would initiate and/or perpetuate atrial fibrillation apart from the pulmonary vein fascicles and antral ablations. We found 1 site(s) on the anterior base of the LA appendage and ablated these foci. After ablation was complete, catheters were placed in the left and right atrium, His-position, right ventricle, and left ventricle for pacing and recording. Arrhythmia was attempted to be induced by rapid atrial and ventricular pacing, and there was no induction of atrial tachydysrhythmia.      Maximum output (20mA) pacing in the L antrum and R antrum were also performed and showed dissociation from the rest of the left atrium. This was checked circumferentially around the antrums and verified many times. We evaluated the roof line and found that there was complete, bidirectional block. The anterior wall ablated line was evaluated and showed complete bidirectional block, as evidenced by a longer transit time on the medial aspect of the ablated line when compared to the lateral aspect of the ablated line when pacing from the LA appendage. Adenosine bolus of 18mg was also given once to assess for acute re-connection and to attempt to induce atrial fibrillation. We had adequate adenosine effect (AV blocks of > 3 seconds) and there were no pulmonary fascicles seen in any of the veins nor were there any atrial tachydysrhythmia induced. We then administered dobutamine infusion up to 10 mcg/kg/min in order to achieve at least a 20% increase in heart rate from the basal heart rate to induce any atrial tachydysrhythmia, and there were no atrial tachydysrhythmia induced. We then performed an EP study and programmed stimulation using our CS catheter and ablation catheter to assess the cardiac conduction system and to attempt to induce atrial tachydysrhythmia. The ablation catheter were moved from the left atrium to the left ventricular apex and His bundle position.  His bundle potentials was recorded and pacing and recordings were performed from right atrium, coronary sinus and LV apex with the following results:     Sinus cycle length was 753 msec  IN interval was 213 msec  QRS duration was 69 msec  QT interval was 419 msec  AH interval was 65 msec  HV interval was 39 msec  Pacing from left atrium, 1:1 conduction over AV node with (AV wenckebach) was 400  msec  Pacing from left atrium, AV geno ERP was 600/330 msec   Pacing from LV apex, 1:1 retrograde conduction over AV node (VA wenckebach) was attempted EP NP in 3 month's time. Thank you for allowing us to participate in the care of your patient. If you have any questions or concerns, please do not hesitate to contact me.     Justice Seymour MD, MS, [facility], [facility]  Cardiac Electrophysiology  [address]  [address] [facility], [address] [place] [address]  Jon Shah [facility] [address]  [phone]

## 2022-02-05 LAB
EKG ATRIAL RATE: 81 BPM
EKG DIAGNOSIS: NORMAL
EKG P AXIS: 78 DEGREES
EKG P-R INTERVAL: 212 MS
EKG Q-T INTERVAL: 402 MS
EKG QRS DURATION: 88 MS
EKG QTC CALCULATION (BAZETT): 466 MS
EKG R AXIS: -32 DEGREES
EKG T AXIS: 31 DEGREES
EKG VENTRICULAR RATE: 81 BPM

## 2022-02-05 PROCEDURE — 93010 ELECTROCARDIOGRAM REPORT: CPT | Performed by: INTERNAL MEDICINE

## 2022-02-10 RX ORDER — ALBUTEROL SULFATE 90 UG/1
AEROSOL, METERED RESPIRATORY (INHALATION)
Qty: 1 EACH | Refills: 3 | Status: ON HOLD | OUTPATIENT
Start: 2022-02-10

## 2022-02-14 ENCOUNTER — TELEPHONE (OUTPATIENT)
Dept: FAMILY MEDICINE CLINIC | Age: 77
End: 2022-02-14

## 2022-02-14 RX ORDER — NITROFURANTOIN 25; 75 MG/1; MG/1
100 CAPSULE ORAL 2 TIMES DAILY
Qty: 20 CAPSULE | Refills: 0 | Status: SHIPPED | OUTPATIENT
Start: 2022-02-14 | End: 2022-02-24

## 2022-02-14 NOTE — TELEPHONE ENCOUNTER
Med sent in   Orders Placed This Encounter   Medications    nitrofurantoin, macrocrystal-monohydrate, (MACROBID) 100 MG capsule     Sig: Take 1 capsule by mouth 2 times daily for 10 days     Dispense:  20 capsule     Refill:  0

## 2022-02-14 NOTE — TELEPHONE ENCOUNTER
----- Message from [employer] sent at 2/14/2022 10:52 AM EST -----  Subject: Refill Request    QUESTIONS  Name of Medication? Other - Antibiotic for kidney infection  Patient-reported dosage and instructions? n/a  How many days do you have left? 0  Preferred Pharmacy? [id number]  Pharmacy phone number (if available)? [phone]  Additional Information for Provider? Patient has possible kidney   infection, requesting medication to treat.  ---------------------------------------------------------------------------  --------------  CALL BACK INFO  What is the best way for the office to contact you? OK to leave message on   voicemail  Preferred Call Back Phone Number?  [phone]

## 2022-03-03 ENCOUNTER — OFFICE VISIT (OUTPATIENT)
Dept: PULMONOLOGY | Age: 77
End: 2022-03-03
Payer: MEDICARE

## 2022-03-03 VITALS
OXYGEN SATURATION: 98 % | DIASTOLIC BLOOD PRESSURE: 80 MMHG | HEIGHT: 64 IN | WEIGHT: 226 LBS | TEMPERATURE: 96.4 F | SYSTOLIC BLOOD PRESSURE: 128 MMHG | HEART RATE: 104 BPM | BODY MASS INDEX: 38.58 KG/M2 | RESPIRATION RATE: 18 BRPM

## 2022-03-03 DIAGNOSIS — J44.9 COPD, SEVERE (HCC): ICD-10-CM

## 2022-03-03 DIAGNOSIS — J44.1 COPD EXACERBATION (HCC): Primary | ICD-10-CM

## 2022-03-03 PROCEDURE — G8427 DOCREV CUR MEDS BY ELIG CLIN: HCPCS | Performed by: INTERNAL MEDICINE

## 2022-03-03 PROCEDURE — 99214 OFFICE O/P EST MOD 30 MIN: CPT | Performed by: INTERNAL MEDICINE

## 2022-03-03 PROCEDURE — G8399 PT W/DXA RESULTS DOCUMENT: HCPCS | Performed by: INTERNAL MEDICINE

## 2022-03-03 PROCEDURE — 3023F SPIROM DOC REV: CPT | Performed by: INTERNAL MEDICINE

## 2022-03-03 PROCEDURE — 1123F ACP DISCUSS/DSCN MKR DOCD: CPT | Performed by: INTERNAL MEDICINE

## 2022-03-03 PROCEDURE — G8417 CALC BMI ABV UP PARAM F/U: HCPCS | Performed by: INTERNAL MEDICINE

## 2022-03-03 PROCEDURE — 4040F PNEUMOC VAC/ADMIN/RCVD: CPT | Performed by: INTERNAL MEDICINE

## 2022-03-03 PROCEDURE — 1090F PRES/ABSN URINE INCON ASSESS: CPT | Performed by: INTERNAL MEDICINE

## 2022-03-03 PROCEDURE — 1036F TOBACCO NON-USER: CPT | Performed by: INTERNAL MEDICINE

## 2022-03-03 PROCEDURE — G8484 FLU IMMUNIZE NO ADMIN: HCPCS | Performed by: INTERNAL MEDICINE

## 2022-03-03 RX ORDER — PREDNISONE 10 MG/1
TABLET ORAL
Qty: 30 TABLET | Refills: 0 | Status: SHIPPED | OUTPATIENT
Start: 2022-03-03 | End: 2022-05-13

## 2022-03-03 RX ORDER — ALBUTEROL SULFATE 2.5 MG/3ML
2.5 SOLUTION RESPIRATORY (INHALATION) EVERY 4 HOURS PRN
Qty: 360 ML | Refills: 11 | Status: SHIPPED | OUTPATIENT
Start: 2022-03-03 | End: 2022-04-04

## 2022-03-03 ASSESSMENT — COPD QUESTIONNAIRES
QUESTION2_CHESTPHLEGM: 4
QUESTION5_HOMEACTIVITIES: 3
QUESTION7_SLEEPQUALITY: 4
QUESTION8_ENERGYLEVEL: 4
QUESTION3_CHESTTIGHTNESS: 1
CAT_TOTALSCORE: 24
QUESTION4_WALKINCLINE: 5
QUESTION1_COUGHFREQUENCY: 2
QUESTION6_LEAVINGHOUSE: 1

## 2022-03-03 NOTE — PROGRESS NOTES
Chief complaint  This is a 69y.o. year old female  who comes to see me with a chief complaint of   Chief Complaint   Patient presents with    COPD    Follow-up       HPI  Here with CC of COPD    Doing worse of late. More SOB and more wheezing. Having to use nebulizer more often too. Says her weight is up and has not been following a strict diet. Did have an ablation about 2 weeks ago. Taking lasix every day but did not take it today since she was coming here. Not sick.  On symbicort and albuterol     Current Outpatient Medications   Medication Sig Dispense Refill    predniSONE (DELTASONE) 10 MG tablet 40mg for 3days 30mg for 3days 20mg for 3days 10mg for 3days 30 tablet 0    albuterol (PROVENTIL) (2.5 MG/3ML) 0.083% nebulizer solution Take 3 mLs by nebulization every 4 hours as needed for Wheezing 360 mL 11    albuterol sulfate  (90 Base) MCG/ACT inhaler INHALE TWO PUFFS BY MOUTH EVERY 4 HOURS AS NEEDED FOR WHEEZING OR FOR SHORTNESS OF BREATH 1 each 3    flecainide (TAMBOCOR) 50 MG tablet Take 1 tablet by mouth every 12 hours 60 tablet 3    metoprolol succinate (TOPROL XL) 25 MG extended release tablet Take 1 tablet by mouth daily 30 tablet 3    furosemide (LASIX) 40 MG tablet Take 1 tablet by mouth daily 90 tablet 1    lisinopril (PRINIVIL;ZESTRIL) 20 MG tablet Take 20 mg by mouth daily      rosuvastatin (CRESTOR) 20 MG tablet Take 1 tablet by mouth daily 90 tablet 2    budesonide-formoterol (SYMBICORT) 160-4.5 MCG/ACT AERO Inhale 2 puffs into the lungs 2 times daily 1 each 5    traZODone (DESYREL) 50 MG tablet Take 1 tablet by mouth nightly as needed for Sleep 30 tablet 1    rivaroxaban (XARELTO) 20 MG TABS tablet TAKE ONE TABLET BY MOUTH DAILY WITH BREAKFAST 30 tablet 11    clobetasol (TEMOVATE) 0.05 % cream Apply topically 2 times daily as needed for Rash Apply to rash between genital area and buttocks and around anus bid prn x up to 2 weeks -need at least 1 week break prior to restarting      mupirocin (BACTROBAN) 2 % ointment Apply topically 3 times daily Apply to scabs three times daily for 10 days or until healed      triamcinolone (KENALOG) 0.1 % ointment Apply topically 2 times daily as needed for Rash Apply to rash between genital area and buttocks and around anus bid prn during breaks from clobetasol      isosorbide mononitrate (IMDUR) 30 MG extended release tablet TAKE ONE TABLET BY MOUTH DAILY (Patient taking differently: Take 30 mg by mouth daily ) 90 tablet 4    albuterol (PROVENTIL) (2.5 MG/3ML) 0.083% nebulizer solution Take 3 mLs by nebulization every 4 hours as needed for Wheezing 540 mL 2    NIFEdipine (ADALAT CC) 60 MG extended release tablet TAKE ONE TABLET BY MOUTH DAILY (Patient taking differently: Take 60 mg by mouth daily ) 90 tablet 3    nitroGLYCERIN (NITROSTAT) 0.4 MG SL tablet Place 1 tablet under the tongue every 5 minutes as needed for Chest pain 25 tablet 1    Multiple Vitamins-Minerals (MULTI FOR HER 50+ PO) Take 1 tablet by mouth daily       No current facility-administered medications for this visit. PHYSICAL EXAM:  Vitals:    03/03/22 0933   BP: 128/80   Pulse: 104   Resp: 18   Temp: 96.4 °F (35.8 °C)   SpO2: 98%       PE  GENERAL:  Well nourished, alert  HEENT:  No scleral icterus, no conjunctival irritation  NECK:  No thyromegaly, no bruits  LYMPH:  No cervical or supraclavicular adenopathy  HEART:  Regular rate and rhythm, no murmurs. Distant heart sounds   LUNGS:  Wheezing, poor air movement   ABDOMEN:  No distention, no organomegaly  EXTREMITIES: + edema, no digital clubbing  NEURO:  No localizing deficits, CN II-XII intact    Pulmonary Function Testing 7/2015  Severe obstruction with no response to bronchodilators    Moderate Restriction    Moderate Reduction in diffusing capacity     Chest imaging from 9/21 is reviewed.   My impression is small effusion, mild adenopathy, scattered emphysema      6 MWT 9/2020  The patient ambulated [facility]:  Politely declined    Lung Cancer Screening CT:  9/2021    Follow up in 4-6 months

## 2022-03-03 NOTE — PATIENT INSTRUCTIONS
Take prednisone burst    May need to take an extra water pill if not doing better in a few days    May need to use nebulizers more frequently    Follow up in 4-6 months

## 2022-03-15 ENCOUNTER — TELEPHONE (OUTPATIENT)
Dept: CARDIOLOGY CLINIC | Age: 77
End: 2022-03-15

## 2022-03-15 DIAGNOSIS — I48.0 PAF (PAROXYSMAL ATRIAL FIBRILLATION) (HCC): Primary | ICD-10-CM

## 2022-03-15 NOTE — TELEPHONE ENCOUNTER
Called and spoke to Dara and she is unsure what her weight was beofre it was 220. She states that it was 220 yesterday. She has increased swelling and shortness of breath x 1 week. She states that she does follow a low sodium diet. Currently she is taking Lasix 40 mg daily (creatinine 1.4 on 1/28/2022). Would you like her to increase Lasix for a couple of days and repeat labs?

## 2022-03-15 NOTE — TELEPHONE ENCOUNTER
Called patient and let her know the recommendations. She verbalized understanding. I have her scheduled to see Florenciahansa Jay Monday in the office. Lab orders have been placed.

## 2022-03-15 NOTE — TELEPHONE ENCOUNTER
Sukhwinder Dawson is calling in wanting to notify Derek Sprague NP that for the last couple of weeks she has had SOB, swelling in legs and feet. Her weight is 220 lbs. Sukhwinder Dawson can be reached at [phone].

## 2022-03-15 NOTE — TELEPHONE ENCOUNTER
Increase Lasix to 40 mg BID x 3 days and then resume normal dosing. She needs to have an ECG performed as I suspect she may be back in atrial fib. Apparently had recent ablation. Check BMP in 5-7 days. Have her follow up in office next week with me.

## 2022-03-18 DIAGNOSIS — I48.0 PAF (PAROXYSMAL ATRIAL FIBRILLATION) (HCC): ICD-10-CM

## 2022-03-18 LAB
ANION GAP SERPL CALCULATED.3IONS-SCNC: 14 MMOL/L (ref 3–16)
BUN BLDV-MCNC: 30 MG/DL (ref 7–20)
CALCIUM SERPL-MCNC: 9.7 MG/DL (ref 8.3–10.6)
CHLORIDE BLD-SCNC: 99 MMOL/L (ref 99–110)
CO2: 31 MMOL/L (ref 21–32)
CREAT SERPL-MCNC: 1.4 MG/DL (ref 0.6–1.2)
GFR AFRICAN AMERICAN: 44
GFR NON-AFRICAN AMERICAN: 37
GLUCOSE BLD-MCNC: 127 MG/DL (ref 70–99)
POTASSIUM SERPL-SCNC: 3.7 MMOL/L (ref 3.5–5.1)
SODIUM BLD-SCNC: 144 MMOL/L (ref 136–145)

## 2022-03-18 NOTE — PROGRESS NOTES
[address]  Office Visit    Jarvis Dyson  1945    March 21, 2022    CC:   Chief Complaint   Patient presents with    Follow-up     sob     Shortness of Breath     HPI:  The patient is 69 y.o. female with a past medical history significant for CAD, chronic atrial fib, HTN, DHF, aortic aneurysm and COPD who is here for follow up d/t increasing SOB, edema and weight gain. She was last seen by general cardiology in January 2022 and noted to be back in atrial fib and referred back to Dr. Maria Luz Chavez. On 2/4/2022 she underwent repeat ablation for atrial fib, flutter and atrial tachycardia by Dr. Maria Luz Chavez. Felt good for a few days and then began with increasing SOB again. Since then she has been having worsening sxs of SOB, edema and weight gain. Her diuretics were adjusted and she was advised to return to office for further evaluation. She was seen by Dr. Fili Santos on 3/3/2022 for COPD exacerbation and advised to increase her lasix and placed on steroids. ECG today demonstrates atrial flutter with VR controlled. She underwent cardiac cath in March 2018 which demonstrated  of RCA and nonobstructive disease of the LAD and LCX. She has had endovascular AAA repair in March 2018 by Dr. Maye Sanchez. She had PVI ablation on 10/2020, DCCV on 1/12/2021 for recurrent atrial fib, repeat atrial fib ablation on 2/17/2021. Overall not feeling well and has noted increased SOB, LE edema. She has been on steroids without little improvement. She has doubled her diuretic and LE edema has improved. She remains SOBOE. She has lost a few # since doubling her diuretic. Sleeps with HOB elevated for breathing. Uses O2 at night on occasion. Denies chest pain/discomfort, SOB, orthopnea/PND, cough, palpitations, dizziness, syncope or claudication. Has known sleep apnea but cannot tolerate CPAP. Limiting sodium and fluid intake. Review of Systems:  Constitutional: Denies weakness, night sweats or fever.  + fatigue (chronic and unchanged)  HEENT: Denies new visual changes, ringing in ears, nosebleeds,nasal congestion  Respiratory: Denies new or change in SOB, PND, orthopnea or cough. Cardiovascular: see HPI  GI: Denies N/V, diarrhea, constipation, abdominal pain, change in bowel habits, melena or hematochezia  : Denies urinary frequency, urgency, incontinence, hematuria or dysuria. Skin: Denies rash, hives, or cyanosis  Musculoskeletal: + chronic low back pain  Neurological: Denies syncope or TIA-like symptoms. Psychiatric: Denies anxiety, insomnia or depression     Past Medical History:   Diagnosis Date    AAA (abdominal aortic aneurysm) ([facility] [address]     pt states it is 4cm    AAA (abdominal aortic aneurysm) without rupture (HCC) 2/10/2015    Atrial fibrillation (HCC)     CAD (coronary artery disease)     CHF (congestive heart failure) ([facility] [address] [facility]     COPD (chronic obstructive pulmonary disease) (HCC)     History of blood clots     Hyperlipidemia     Hypertension      Past Surgical History:   Procedure Laterality Date    ABDOMINAL AORTIC ANEURYSM REPAIR      Endovascular abdominal AA    APPENDECTOMY      incidental    BLADDER SUSPENSION      CATARACT REMOVAL      CHOLECYSTECTOMY  10/15/13    COLONOSCOPY  07    dr Iliana Morales and check in 5 years.  COLONOSCOPY  2017    ok dr arndt, repeat 5 years   [address]    for benign tumor.  just the uterus    JOINT REPLACEMENT  2013    right knee replacement    TONSILLECTOMY  as a child    TUMOR EXCISION      benign behind right ear about      Family History   Problem Relation Age of Onset    Heart Disease Father     Hypertension Other      Social History     Tobacco Use    Smoking status: Former Smoker     Packs/day: 1.00     Years: 37.00     Pack years: 37.00     Types: Cigarettes     Start date: 1976     Quit date: 2013     Years since quittin.5    Smokeless tobacco: Never Used    Tobacco comment: E cigarette now and then Vaping Use    Vaping Use: Some days    Substances: Nicotine   Substance Use Topics    Alcohol use: No    Drug use: No       Allergies   Allergen Reactions    Morphine Rash     rash    Other Rash     Current Outpatient Medications   Medication Sig Dispense Refill    lisinopril (PRINIVIL;ZESTRIL) 20 MG tablet Take 1 tablet by mouth daily 30 tablet 3    predniSONE (DELTASONE) 10 MG tablet 40mg for 3days 30mg for 3days 20mg for 3days 10mg for 3days 30 tablet 0    albuterol (PROVENTIL) (2.5 MG/3ML) 0.083% nebulizer solution Take 3 mLs by nebulization every 4 hours as needed for Wheezing 360 mL 11    albuterol sulfate  (90 Base) MCG/ACT inhaler INHALE TWO PUFFS BY MOUTH EVERY 4 HOURS AS NEEDED FOR WHEEZING OR FOR SHORTNESS OF BREATH 1 each 3    flecainide (TAMBOCOR) 50 MG tablet Take 1 tablet by mouth every 12 hours 60 tablet 3    metoprolol succinate (TOPROL XL) 25 MG extended release tablet Take 1 tablet by mouth daily 30 tablet 3    furosemide (LASIX) 40 MG tablet Take 1 tablet by mouth daily 90 tablet 1    rosuvastatin (CRESTOR) 20 MG tablet Take 1 tablet by mouth daily 90 tablet 2    budesonide-formoterol (SYMBICORT) 160-4.5 MCG/ACT AERO Inhale 2 puffs into the lungs 2 times daily 1 each 5    traZODone (DESYREL) 50 MG tablet Take 1 tablet by mouth nightly as needed for Sleep 30 tablet 1    rivaroxaban (XARELTO) 20 MG TABS tablet TAKE ONE TABLET BY MOUTH DAILY WITH BREAKFAST 30 tablet 11    clobetasol (TEMOVATE) 0.05 % cream Apply topically 2 times daily as needed for Rash Apply to rash between genital area and buttocks and around anus bid prn x up to 2 weeks -need at least 1 week break prior to restarting      mupirocin (BACTROBAN) 2 % ointment Apply topically 3 times daily Apply to scabs three times daily for 10 days or until healed      triamcinolone (KENALOG) 0.1 % ointment Apply topically 2 times daily as needed for Rash Apply to rash between genital area and buttocks and around anus 09/10/2021    HDL 56 09/10/2021    HDL 38 09/09/2011    LDLCALC 39 09/10/2021    LDLDIRECT 111 09/06/2012    LABVLDL 12 09/10/2021     Lab Results   Component Value Date     03/18/2022    K 3.7 03/18/2022    K 4.2 09/09/2021    CL 99 03/18/2022    CO2 31 03/18/2022    BUN 30 03/18/2022    CREATININE 1.4 03/18/2022    GLUCOSE 127 03/18/2022    GLUCOSE 102 07/14/2016    CALCIUM 9.7 03/18/2022      Lab Results   Component Value Date    WBC 11.4 (H) 02/04/2022    HGB 13.2 02/04/2022    HCT 40.2 02/04/2022    MCV 88.4 02/04/2022     02/04/2022     ECG 3/21/2022:  Atrial flutter with controlled VR    2/4/2022 Ablation  - Pre- and post-procedure diagnoses were roof-dependent left atrial tachycardia with  ms with proximal-distal activation and mitral isthmus dependent left atrial flutter with  ms with proximal-distal activation  - Pulmonary vein isolations using wide area circumferential radiofrequency ablation aroundthe anterior L and R antrums  - Additional ablation with creation of roof line (for roof-dependent left atrial tachycardia), anterior line from the mitral annulus to the roof (for mitral isthmus dependent left atrial flutter)  - Ablation of complex fractionated atrial electrogram in the left atrial septal wall (for atrial fibrillation)  - Ablation of complex fractionated atrial electrogram in the left atrial appendage anterior base (for atrial fibrillation)    Echo 9/10/2021: Mod conc. LVH with normal LV size & wall motion. EF is ~ 60%. Diastolic filling parameters suggest grade II diastolic dysfunction. The left atrium is severely dilated. Normal right ventricular size and function. Trivial mitral, aortic and tricuspid regurgitation. Echo 9/25/2020:   Concentric LVH with normal LV size and wall motion. EF is    60%. Grade II diastolic dysfunction with elevated LV filling pressures. Trivial mitral regurgitation is present. The left atrium is severely dilated.    Mild aortic regurgitation. Right ventricular systolic function is normal .   Trivial tricuspid regurgitation. Echo 1/2/2020: There is moderate concentric left ventricular hypertrophy. Patient appears to be in atrial fibrillation. Ejection fraction is estimated to be 50-60%. Indeterminate diastolic function. Mild aortic regurgitation. Mild tricuspid regurgitation. Mild mitral regurgitation with mitral annular calcification    2/28/2020 R heart cath:  HEMODYNAMIC DATA:  Right atrial mean pressure was 3 with oxygen  saturation of 69%. RV pressure was 33/2 with mean of 4 and oxygen  saturation of 72%. PA pressure was 43/14 with a mean of 24, oxygen  saturation was 68%. Pulmonary capillary wedge pressure mean was 29  mmHg.     Cardiac output by Blanca method was 6.13 liters per minute. Cardiac index  was 2.99 liters per minute per body surface area. Thermodilution  cardiac output was 4.88 liters per minute and cardiac index was 2.38  liters per minute per body surface area.     OVERALL IMPRESSION:  1.  _____ normal right cardiac pressure. 2.  Elevated pulmonary capillary wedge pressure with a mean pulmonary  capillary wedge of 29 mmHg. 3.  Normal cardiac output and indices. 4.  No oxygen step off to suggest ASD/PFO. Cardiac Cath 3/5/18  OVERALL IMPRESSION  1.  Again, 100% proximal right coronary artery occlusion with left to right  collaterals. 2.  Mild disease of the distal left main trunk. 3.  20% to 30% ostial left anterior descending artery stenosis with  collaterals from LAD to the right coronary artery. 4.  30% to 40% stenosis of the proximal circumflex artery. 5.  Normal left ventricular systolic function with estimated EF of 55% to  60%. 6.  Slightly enlarged aortic root with no evidence of aortic stenosis or  regurgitation. 2/19/2018 Lexiscan-Myoview:  Abnormal study.  There is a moderate sized, mild intensity, partially    reversible defect of the mid to apical anterior and mid anterolateral walls    which is consistent with ischemia. There is breast attenuation but stress    images appear worse.    Normal LV size and systolic function.    Overall findings represent an intermediate risk study. 1/9/2018 Echo:   Mild concentric left ventricular hypertrophy. Visually estimated ejection fraction of 65%. Mitral annular calcification. Mild left atrial dilation. Mild aortic stenosis. (mean gradients 13ISHH)   The systolic pulmonary artery pressure (SPAP) estimated at 30 mmHg   (estimated RA pressure of 8 mmHg included). [facility]: ([facility]) 4/2017   of RCA chronic LAD 10-20% RA 4 PA24/9 16 wedge 9 LVEDP markedly elevated 30     Assessment:    1. Atrial flutter, paroxysmal ([address]  -has become persistent  -S/P ablation on 2/4/2022 and now with recurrent atrial flutter  -controlled VR  -has had prior DCCV's and ablations  -continue BB  -continue anticoagulation    2. PAF (paroxysmal atrial fibrillation) (HCC)  -prior ablations and DCCV  -on Xarelto (CHADS score 7)  -last ablation on 2/4/2022  -on Toprol and Flecainide (recommend dc'ing but will discuss with EP)    3. Essential hypertension  -controlled  -goal BP < 130/80  -continue medical management    4. SOB (shortness of breath)  -suspect multifactorial  -underlying COPD +/- chronic diastolic HF+/- deconditioning  -recently seen by Dr. Shade Dobson and placed on steroids    5. Chronic diastolic heart failure ([address]  -appears euvolemic after increase in diuretic  -last echo in 9/2021: LVEF 60%; grade II diastolic dysfunction  -continue Toprol, lisinopril, lasix  -low sodium diet and fluid restriction    6. Chronic obstructive pulmonary disease, unspecified COPD type ([address]  -follows Dr. Shade Dobson    7. Coronary artery disease involving native coronary artery of native heart without angina  - of RCA; LAD and LCX nonobstructive on cath  -no recurrent angina  -continue medical management with Toprol, crestor and imdur  -not on ASA d/t Xarelto    8.

## 2022-03-21 ENCOUNTER — OFFICE VISIT (OUTPATIENT)
Dept: CARDIOLOGY CLINIC | Age: 77
End: 2022-03-21
Payer: MEDICARE

## 2022-03-21 VITALS
HEART RATE: 84 BPM | SYSTOLIC BLOOD PRESSURE: 134 MMHG | OXYGEN SATURATION: 98 % | DIASTOLIC BLOOD PRESSURE: 84 MMHG | WEIGHT: 223 LBS | HEIGHT: 64 IN | BODY MASS INDEX: 38.07 KG/M2

## 2022-03-21 DIAGNOSIS — J44.9 CHRONIC OBSTRUCTIVE PULMONARY DISEASE, UNSPECIFIED COPD TYPE (HCC): ICD-10-CM

## 2022-03-21 DIAGNOSIS — I48.92 ATRIAL FLUTTER, PAROXYSMAL (HCC): Primary | ICD-10-CM

## 2022-03-21 DIAGNOSIS — I50.32 CHRONIC DIASTOLIC HEART FAILURE (HCC): ICD-10-CM

## 2022-03-21 DIAGNOSIS — I10 ESSENTIAL HYPERTENSION: ICD-10-CM

## 2022-03-21 DIAGNOSIS — I48.0 PAF (PAROXYSMAL ATRIAL FIBRILLATION) (HCC): ICD-10-CM

## 2022-03-21 DIAGNOSIS — I25.10 CORONARY ARTERY DISEASE INVOLVING NATIVE CORONARY ARTERY OF NATIVE HEART WITHOUT ANGINA PECTORIS: ICD-10-CM

## 2022-03-21 DIAGNOSIS — R06.02 SOB (SHORTNESS OF BREATH): ICD-10-CM

## 2022-03-21 PROCEDURE — G8484 FLU IMMUNIZE NO ADMIN: HCPCS | Performed by: NURSE PRACTITIONER

## 2022-03-21 PROCEDURE — 99214 OFFICE O/P EST MOD 30 MIN: CPT | Performed by: NURSE PRACTITIONER

## 2022-03-21 PROCEDURE — 3023F SPIROM DOC REV: CPT | Performed by: NURSE PRACTITIONER

## 2022-03-21 PROCEDURE — G8427 DOCREV CUR MEDS BY ELIG CLIN: HCPCS | Performed by: NURSE PRACTITIONER

## 2022-03-21 PROCEDURE — 1090F PRES/ABSN URINE INCON ASSESS: CPT | Performed by: NURSE PRACTITIONER

## 2022-03-21 PROCEDURE — 1123F ACP DISCUSS/DSCN MKR DOCD: CPT | Performed by: NURSE PRACTITIONER

## 2022-03-21 PROCEDURE — G8399 PT W/DXA RESULTS DOCUMENT: HCPCS | Performed by: NURSE PRACTITIONER

## 2022-03-21 PROCEDURE — 1036F TOBACCO NON-USER: CPT | Performed by: NURSE PRACTITIONER

## 2022-03-21 PROCEDURE — 4040F PNEUMOC VAC/ADMIN/RCVD: CPT | Performed by: NURSE PRACTITIONER

## 2022-03-21 PROCEDURE — 93000 ELECTROCARDIOGRAM COMPLETE: CPT | Performed by: NURSE PRACTITIONER

## 2022-03-21 PROCEDURE — G8417 CALC BMI ABV UP PARAM F/U: HCPCS | Performed by: NURSE PRACTITIONER

## 2022-03-21 RX ORDER — LISINOPRIL 20 MG/1
20 TABLET ORAL DAILY
Qty: 30 TABLET | Refills: 3 | Status: SHIPPED | OUTPATIENT
Start: 2022-03-21 | End: 2022-08-19

## 2022-03-21 RX ORDER — NIFEDIPINE 60 MG/1
TABLET, FILM COATED, EXTENDED RELEASE ORAL
Qty: 90 TABLET | Refills: 0 | Status: ON HOLD
Start: 2022-03-21 | End: 2022-05-26 | Stop reason: HOSPADM

## 2022-03-21 NOTE — TELEPHONE ENCOUNTER
Last OV: 3/21/22  Next OV: 5/5/22  Last refill:3/2/21  Most recent Labs: 3/18/22  Last EKG (if needed):3/21/22

## 2022-03-30 DIAGNOSIS — I10 ESSENTIAL HYPERTENSION: ICD-10-CM

## 2022-03-30 DIAGNOSIS — I50.32 CHRONIC DIASTOLIC HEART FAILURE (HCC): ICD-10-CM

## 2022-03-30 LAB
ANION GAP SERPL CALCULATED.3IONS-SCNC: 13 MMOL/L (ref 3–16)
BUN BLDV-MCNC: 27 MG/DL (ref 7–20)
CALCIUM SERPL-MCNC: 9.2 MG/DL (ref 8.3–10.6)
CHLORIDE BLD-SCNC: 99 MMOL/L (ref 99–110)
CO2: 24 MMOL/L (ref 21–32)
CREAT SERPL-MCNC: 1.3 MG/DL (ref 0.6–1.2)
GFR AFRICAN AMERICAN: 48
GFR NON-AFRICAN AMERICAN: 40
GLUCOSE BLD-MCNC: 96 MG/DL (ref 70–99)
POTASSIUM SERPL-SCNC: 4.7 MMOL/L (ref 3.5–5.1)
SODIUM BLD-SCNC: 136 MMOL/L (ref 136–145)

## 2022-04-01 NOTE — PROGRESS NOTES
[address]   Electrophysiology      Date: 4/4/2022    Primary Cardiologist: Farida Emery MD  PCP: Terry Oglesby MD     Chief Complaint:   Chief Complaint   Patient presents with    Follow-up     1-2 wk f/u. A-fib, SOB     History of Present Illness:    I saw Courtney Baxter in the office for electrophysiology follow up today. She is a 69 y.o. female with a past medical history of persistent atrial fibrillation, AAA, CAD, HTN, COPD, PE, DVT and diastolic HF. She underwent EP study with radiofrequency ablation of atrial fibrillation and pulmonary vein isolation, additional ablation with creation of a roof line (for L flutter) and anterior line from mitral annulus to the roof (for L flutter) on 10/7/20 with Dr. Lauren العلي. She had recurrent A fib when seen in the office for follow up in January 2021. She underwent successful DC cardioversion on 1/12/21. She then underwent EP study with RFA of left atrial flutter with pulmonary vein isolation, roof line ablation (for L flutter), anterior wall (L flutter), appendage ablation (for L flutter) and low anterior wall ablation (for L flutter) on 2/17/21 with Dr. Lauren العلي. She had recurrent atrial flutter when seen in the office on 1/25/22. She underwent atrial fibrillation (PVI, CAFE), left atrial flutter (roof line) and left atrial tachycardia ablation on 2/4/22 with Dr. Lauren العلي. She was resumed on flecaindie 50mg BID and Toprol was decreased to 25mg QD. She was back in atrial fibrillation when seen by Jer De La Vega NP on 3/21/22. She presents today for follow up. She continues to have SOB which has been present since her ablation and has maybe gotten a little better. Denies any increased edema, orthopnea or PND. No chest pain or palpitations. No bleeding issues or missed doses of her Xarelto. Allergies: Allergies   Allergen Reactions    Morphine Rash     rash    Other Rash     Home Medications:  Prior to Visit Medications    Medication Sig Taking?  Authorizing Provider   flecainide (TAMBOCOR) 100 MG tablet Take 1 tablet by mouth every 12 hours Yes CHRISTINE Christianson CNP   NIFEdipine (ADALAT CC) 60 MG extended release tablet TAKE ONE TABLET BY MOUTH DAILY Yes Naida Nichols MD   lisinopril (PRINIVIL;ZESTRIL) 20 MG tablet Take 1 tablet by mouth daily Yes CHRISTINE Manjarrez CNP   predniSONE (DELTASONE) 10 MG tablet 40mg for 3days 30mg for 3days 20mg for 3days 10mg for 3days Yes Yoli Sessions, DO   albuterol sulfate  (90 Base) MCG/ACT inhaler INHALE TWO PUFFS BY MOUTH EVERY 4 HOURS AS NEEDED FOR WHEEZING OR FOR SHORTNESS OF BREATH Yes [place]ia Sessions, DO   metoprolol succinate (TOPROL XL) 25 MG extended release tablet Take 1 tablet by mouth daily Yes Dalila Luciano MD   furosemide (LASIX) 40 MG tablet Take 1 tablet by mouth daily Yes Dalila Luciano MD   rosuvastatin (CRESTOR) 20 MG tablet Take 1 tablet by mouth daily Yes CHRISTINE Manjarrez - FRANNY   budesonide-formoterol (SYMBICORT) 160-4.5 MCG/ACT AERO Inhale 2 puffs into the lungs 2 times daily Yes Yoli Sessions, DO   traZODone (DESYREL) 50 MG tablet Take 1 tablet by mouth nightly as needed for Sleep Yes Luke Floyd MD   rivaroxaban (XARELTO) 20 MG TABS tablet TAKE ONE TABLET BY MOUTH DAILY WITH BREAKFAST Yes Yoli Sessions, DO   clobetasol (TEMOVATE) 0.05 % cream Apply topically 2 times daily as needed for Rash Apply to rash between genital area and buttocks and around anus bid prn x up to 2 weeks -need at least 1 week break prior to restarting Yes Historical Provider, MD   mupirocin (BACTROBAN) 2 % ointment Apply topically 3 times daily Apply to scabs three times daily for 10 days or until healed Yes Historical Provider, MD   triamcinolone (KENALOG) 0.1 % ointment Apply topically 2 times daily as needed for Rash Apply to rash between genital area and buttocks and around anus bid prn during breaks from clobetasol Yes Historical Provider, MD   isosorbide mononitrate (IMDUR) 30 MG extended release tablet TAKE ONE TABLET BY MOUTH DAILY  Patient taking differently: Take 30 mg by mouth daily  Yes Holly Malik MD   albuterol (PROVENTIL) (2.5 MG/3ML) 0.083% nebulizer solution Take 3 mLs by nebulization every 4 hours as needed for Wheezing Yes CHRISTINE Landaverde CNP   nitroGLYCERIN (NITROSTAT) 0.4 MG SL tablet Place 1 tablet under the tongue every 5 minutes as needed for Chest pain Yes Hailee Moran MD   Multiple Vitamins-Minerals (MULTI FOR HER 50+ PO) Take 1 tablet by mouth daily Yes Historical Provider, MD        Past Medical History:  Past Medical History:   Diagnosis Date    AAA (abdominal aortic aneurysm) ([address]     pt states it is 4cm    AAA (abdominal aortic aneurysm) without rupture ([address] 2/10/2015    Atrial fibrillation ([address]     CAD (coronary artery disease)     CHF (congestive heart failure) ([address]     COPD (chronic obstructive pulmonary disease) ([address]     History of blood clots     Hyperlipidemia     Hypertension        Past Surgical History:   Past Surgical History:   Procedure Laterality Date    ABDOMINAL AORTIC ANEURYSM REPAIR      Endovascular abdominal AA    APPENDECTOMY  1990    incidental    BLADDER SUSPENSION      CATARACT REMOVAL      CHOLECYSTECTOMY  10/15/13    COLONOSCOPY  9/2/07    dr Tay Francisco and check in 5 years.  COLONOSCOPY  06/16/2017    ok dr arndt, repeat 5 years   [address]    for benign tumor. just the uterus    JOINT REPLACEMENT  12/2013    right knee replacement    TONSILLECTOMY  as a child    TUMOR EXCISION      benign behind right ear about 2008       Social History:   reports that she quit smoking about 8 years ago. Her smoking use included cigarettes. She started smoking about 45 years ago. She has a 37.00 pack-year smoking history. She has never used smokeless tobacco. She reports that she does not drink alcohol and does not use drugs.      Family History:      Problem Relation Age of Onset    Heart Disease Father     Hypertension Other        Review of Systems   Constitutional: Positive for fatigue. Negative for chills, fever and unexpected weight change. HENT: Negative for congestion, hearing loss, sinus pressure, sore throat and trouble swallowing. Respiratory: Positive for shortness of breath. Negative for cough and wheezing. Cardiovascular: Negative for chest pain, palpitations and leg swelling. Gastrointestinal: Negative for abdominal pain, blood in stool, constipation, diarrhea, nausea and vomiting. Genitourinary: Negative for hematuria. Musculoskeletal: Negative for arthralgias, back pain, gait problem and myalgias. Skin: Negative for color change, rash and wound. Neurological: Negative for dizziness, seizures, syncope, speech difficulty, weakness and light-headedness. Hematological: Does not bruise/bleed easily. Physical Examination:  Vitals:    04/04/22 1237   BP: 136/84   Pulse: 89   SpO2: 97%      Wt Readings from Last 3 Encounters:   04/04/22 229 lb 9.6 oz (104.1 kg)   03/21/22 223 lb (101.2 kg)   03/03/22 226 lb (102.5 kg)       Physical Exam  Vitals reviewed. Constitutional:       General: She is not in acute distress. Appearance: Normal appearance. HENT:      Head: Normocephalic and atraumatic. Nose: Nose normal.      Mouth/Throat:      Mouth: Mucous membranes are moist.   Eyes:      Conjunctiva/sclera: Conjunctivae normal.      Pupils: Pupils are equal, round, and reactive to light. Cardiovascular:      Rate and Rhythm: Normal rate. Rhythm irregularly irregular. Heart sounds: No murmur heard. No friction rub. No gallop. Pulmonary:      Effort: No respiratory distress. Breath sounds: No wheezing, rhonchi or rales. Abdominal:      General: Abdomen is flat. Bowel sounds are normal.      Palpations: Abdomen is soft. Musculoskeletal:         General: Normal range of motion. Right lower leg: No edema.       Left lower leg: No edema.   Skin:     General: Skin is warm and dry. Findings: No bruising. Neurological:      General: No focal deficit present. Mental Status: She is alert and oriented to person, place, and time. Motor: No weakness. Psychiatric:         Mood and Affect: Mood normal.         Behavior: Behavior normal.          Pertinent labs, diagnostic, device, and imaging results reviewed as a part of this visit    LABS    CBC:   Lab Results   Component Value Date    WBC 11.4 (H) 2022    HGB 13.2 2022    HCT 40.2 2022    MCV 88.4 2022     2022     BMP:   Lab Results   Component Value Date    CREATININE 1.3 (H) 2022    BUN 27 (H) 2022     2022    K 4.7 2022    CL 99 2022    CO2 24 2022     Estimated Creatinine Clearance: 43 mL/min (A) (based on SCr of 1.3 mg/dL (H)). No results found for: BNP    Thyroid:   Lab Results   Component Value Date    TSH 0.66 2019     Lipid Panel:   Lab Results   Component Value Date    CHOL 107 09/10/2021    HDL 56 09/10/2021    HDL 38 2011    TRIG 59 09/10/2021     LFTs:  Lab Results   Component Value Date    ALT 12 2021    AST 17 2021    ALKPHOS 92 2021    BILITOT 0.5 2021     Coags:   Lab Results   Component Value Date    PROTIME 26.0 (H) 02/15/2020    INR 2.22 (H) 02/15/2020    APTT 37.2 (H) 02/15/2020       EC2022   Atrial fibrillation at 80 BPM.     Echo: 9/10/21   Mod conc. LVH with normal LV size & wall motion. EF is    60%. Diastolic   filling parameters suggest grade II diastolic dysfunction. The left atrium is severely dilated. Normal right ventricular size and function. Trivial mitral, aortic and tricuspid regurgitation.     [facility]: 3/5/18  OVERALL IMPRESSION  1.  Again, 100% proximal right coronary artery occlusion with left to right  collaterals. 2.  Mild disease of the distal left main trunk.   3.  20% to 30% ostial left anterior descending artery stenosis with  collaterals from LAD to the right coronary artery. 4.  30% to 40% stenosis of the proximal circumflex artery. 5.  Normal left ventricular systolic function with estimated EF of 55% to  60%. 6.  Slightly enlarged aortic root with no evidence of aortic stenosis or  Regurgitation.     [facility]: 2/28/20  OVERALL IMPRESSION:  1.  _____ normal right cardiac pressure. 2.  Elevated pulmonary capillary wedge pressure with a mean pulmonary  capillary wedge of 29 mmHg. 3.  Normal cardiac output and indices. 4.  No oxygen step off to suggest ASD/PFO.     Stress: 2/19/18  Conclusions          Summary     Abnormal study. There is a moderate sized, mild intensity, partially     reversible defect of the mid to apical anterior and mid anterolateral walls     which is consistent with ischemia. There is breast attenuation but stress     images appear worse.     Normal LV size and systolic function.     Overall findings represent an intermediate risk study.            Procedures:  1. PVI, roof line (for L flutter), anterior line ablation (for L flutter) 10/7/20, Dr. Betsy Anna  2. Successful DCCV for persistent A fib, 1/12/21, Dr. Betsy Anna  3. PVI for L flutter, roof line (for L flutter), anterior wall (L flutter), appendage ablation (for L flutter) and low anterior wall ablation (for L flutter), 2/17/2, Dr. Betsy Anna.    4. Atrial fibrillation (PVI, CAFE), left atrial flutter (roof line) and left atrial tachycardia ablation on 2/4/22, Dr. Betsy Anna       Assessment and Plan:      Persistent atrial fibrillation             - s/p PVI ablation on 10/7/20 with Dr. Betsy Anna, had recurrent A fib noted in January 2021 and had successful DCCV later that month              - s/p PVI again in February 2021 and February 2022    - back in atrial fibrillation on 3/21/22   - on flecainide, Toprol   - CHADS2-VASc 5 (age, gender, HTN, HF, CAD) on Xarelto 20mg QD   - EKG today with rate controlled A fib   - discussed treatment options including rate control,

## 2022-04-04 ENCOUNTER — OFFICE VISIT (OUTPATIENT)
Dept: CARDIOLOGY CLINIC | Age: 77
End: 2022-04-04
Payer: MEDICARE

## 2022-04-04 VITALS
WEIGHT: 229.6 LBS | DIASTOLIC BLOOD PRESSURE: 84 MMHG | SYSTOLIC BLOOD PRESSURE: 136 MMHG | OXYGEN SATURATION: 97 % | HEART RATE: 89 BPM | BODY MASS INDEX: 39.2 KG/M2 | HEIGHT: 64 IN

## 2022-04-04 DIAGNOSIS — I10 ESSENTIAL HYPERTENSION: ICD-10-CM

## 2022-04-04 DIAGNOSIS — I71.40 AAA (ABDOMINAL AORTIC ANEURYSM) WITHOUT RUPTURE: ICD-10-CM

## 2022-04-04 DIAGNOSIS — I48.19 PERSISTENT ATRIAL FIBRILLATION (HCC): Primary | ICD-10-CM

## 2022-04-04 DIAGNOSIS — I48.92 LEFT ATRIAL FLUTTER BY ELECTROCARDIOGRAM (HCC): ICD-10-CM

## 2022-04-04 DIAGNOSIS — I25.118 ATHEROSCLEROTIC HEART DISEASE OF NATIVE CORONARY ARTERY WITH OTHER FORMS OF ANGINA PECTORIS (HCC): ICD-10-CM

## 2022-04-04 DIAGNOSIS — I50.32 CHRONIC DIASTOLIC HEART FAILURE (HCC): ICD-10-CM

## 2022-04-04 PROCEDURE — 99214 OFFICE O/P EST MOD 30 MIN: CPT | Performed by: NURSE PRACTITIONER

## 2022-04-04 PROCEDURE — 1123F ACP DISCUSS/DSCN MKR DOCD: CPT | Performed by: NURSE PRACTITIONER

## 2022-04-04 PROCEDURE — G8417 CALC BMI ABV UP PARAM F/U: HCPCS | Performed by: NURSE PRACTITIONER

## 2022-04-04 PROCEDURE — 4040F PNEUMOC VAC/ADMIN/RCVD: CPT | Performed by: NURSE PRACTITIONER

## 2022-04-04 PROCEDURE — 1036F TOBACCO NON-USER: CPT | Performed by: NURSE PRACTITIONER

## 2022-04-04 PROCEDURE — G8427 DOCREV CUR MEDS BY ELIG CLIN: HCPCS | Performed by: NURSE PRACTITIONER

## 2022-04-04 PROCEDURE — G8399 PT W/DXA RESULTS DOCUMENT: HCPCS | Performed by: NURSE PRACTITIONER

## 2022-04-04 PROCEDURE — 93000 ELECTROCARDIOGRAM COMPLETE: CPT | Performed by: NURSE PRACTITIONER

## 2022-04-04 PROCEDURE — 1090F PRES/ABSN URINE INCON ASSESS: CPT | Performed by: NURSE PRACTITIONER

## 2022-04-04 RX ORDER — FLECAINIDE ACETATE 100 MG/1
100 TABLET ORAL EVERY 12 HOURS SCHEDULED
Qty: 60 TABLET | Refills: 5 | Status: SHIPPED | OUTPATIENT
Start: 2022-04-04 | End: 2022-05-13

## 2022-04-04 ASSESSMENT — ENCOUNTER SYMPTOMS
WHEEZING: 0
COUGH: 0
BLOOD IN STOOL: 0
BACK PAIN: 0
VOMITING: 0
SORE THROAT: 0
DIARRHEA: 0
COLOR CHANGE: 0
SHORTNESS OF BREATH: 1
NAUSEA: 0
CONSTIPATION: 0
ABDOMINAL PAIN: 0
TROUBLE SWALLOWING: 0
SINUS PRESSURE: 0

## 2022-04-06 ENCOUNTER — TELEPHONE (OUTPATIENT)
Dept: PULMONOLOGY | Age: 77
End: 2022-04-06

## 2022-04-06 NOTE — TELEPHONE ENCOUNTER
Pooja Lloyd calls. Her sob has not improved since her appointment on 3/3/21. Cannot say she noticed a difference when taking prednisone burst.  Thinks weight is up but cannot say how much. Her home pulse ox stayed 92-93 when she walked down her hallway ( lives in mobile home). Struggled to talk in full sentences and heard wheezing before she got back to her chair. (Tells me this is normal for her.) Uses her nebulizer 3-4 times daily and albuterol. Symbicort is too expensive so does not use. Denies fever or cough. Please advise.

## 2022-04-07 NOTE — TELEPHONE ENCOUNTER
Last time this happened it was felt to be related to her heart. Maybe she needs to reach out to her cardiologist to see if she needs to use more lasix.

## 2022-04-11 NOTE — PROGRESS NOTES
[address]   Electrophysiology      Date: 4/12/2022    Primary Cardiologist: Isauro Pena MD  PCP: Marcelino Napier MD     Chief Complaint:   Chief Complaint   Patient presents with    Follow-up     sob on exertion      History of Present Illness:    I saw Aneta Dakins in the office for electrophysiology follow up today. She is a 69 y.o. female with a past medical history of persistent atrial fibrillation, AAA, CAD, HTN, COPD, PE, DVT and diastolic HF. She underwent EP study with radiofrequency ablation of atrial fibrillation and pulmonary vein isolation, additional ablation with creation of a roof line (for L flutter) and anterior line from mitral annulus to the roof (for L flutter) on 10/7/20 with Dr. Sanam Avalos. She had recurrent A fib when seen in the office for follow up in January 2021. She underwent successful DC cardioversion on 1/12/21. She then underwent EP study with RFA of left atrial flutter with pulmonary vein isolation, roof line ablation (for L flutter), anterior wall (L flutter), appendage ablation (for L flutter) and low anterior wall ablation (for L flutter) on 2/17/21 with Dr. Sanam Avalos. She had recurrent atrial flutter when seen in the office on 1/25/22. She underwent atrial fibrillation (PVI, CAFE), left atrial flutter (roof line) and left atrial tachycardia ablation on 2/4/22 with Dr. Sanam Avalos. She was resumed on flecaindie 50mg BID and Toprol was decreased to 25mg QD. She was back in atrial fibrillation when seen by Daily Kerr NP on 3/21/22. She was seen in the office last week and remained in atrial fibrillation. She did not want to schedule any procedures at that time and instead wanted to increase flecainide to 100mg BID. She presents today for follow up. She continues to have dyspnea on exertion and doesn't want to continue feeling like she does. Denies any increased edema, weight has been stable. No chest pain or palpitations. No syncope.  No bleeding issues or missed doses of Xarelto. Allergies: Allergies   Allergen Reactions    Morphine Rash     rash    Other Rash     Home Medications:  Prior to Visit Medications    Medication Sig Taking?  Authorizing Provider   flecainide (TAMBOCOR) 100 MG tablet Take 1 tablet by mouth every 12 hours Yes CHRISTINE Christianson CNP   NIFEdipine (ADALAT CC) 60 MG extended release tablet TAKE ONE TABLET BY MOUTH DAILY Yes Loren Huntley MD   lisinopril (PRINIVIL;ZESTRIL) 20 MG tablet Take 1 tablet by mouth daily Yes CHRISTINE Parker CNP   predniSONE (DELTASONE) 10 MG tablet 40mg for 3days 30mg for 3days 20mg for 3days 10mg for 3days Yes Nilesh Dom, DO   albuterol sulfate  (90 Base) MCG/ACT inhaler INHALE TWO PUFFS BY MOUTH EVERY 4 HOURS AS NEEDED FOR WHEEZING OR FOR SHORTNESS OF BREATH Yes Netoe Dom, DO   metoprolol succinate (TOPROL XL) 25 MG extended release tablet Take 1 tablet by mouth daily Yes Otilia Benson MD   furosemide (LASIX) 40 MG tablet Take 1 tablet by mouth daily Yes Otilia Benson MD   rosuvastatin (CRESTOR) 20 MG tablet Take 1 tablet by mouth daily Yes CHRISTINE Parker CNP   budesonide-formoterol (SYMBICORT) 160-4.5 MCG/ACT AERO Inhale 2 puffs into the lungs 2 times daily Yes Netoe Dom, DO   traZODone (DESYREL) 50 MG tablet Take 1 tablet by mouth nightly as needed for Sleep Yes Libertad Lemons MD   rivaroxaban (XARELTO) 20 MG TABS tablet TAKE ONE TABLET BY MOUTH DAILY WITH BREAKFAST Yes Nilesh Dom, DO   clobetasol (TEMOVATE) 0.05 % cream Apply topically 2 times daily as needed for Rash Apply to rash between genital area and buttocks and around anus bid prn x up to 2 weeks -need at least 1 week break prior to restarting Yes Historical Provider, MD   mupirocin (BACTROBAN) 2 % ointment Apply topically 3 times daily Apply to scabs three times daily for 10 days or until healed Yes Historical Provider, MD   triamcinolone (KENALOG) 0.1 % ointment Apply topically 2 times daily as needed for Rash Apply to rash between genital area and buttocks and around anus bid prn during breaks from clobetasol Yes Historical Provider, MD   isosorbide mononitrate (IMDUR) 30 MG extended release tablet TAKE ONE TABLET BY MOUTH DAILY  Patient taking differently: Take 30 mg by mouth daily  Yes Domi Toth MD   albuterol (PROVENTIL) (2.5 MG/3ML) 0.083% nebulizer solution Take 3 mLs by nebulization every 4 hours as needed for Wheezing Yes CHRISTINE Mcginnis - CNP   nitroGLYCERIN (NITROSTAT) 0.4 MG SL tablet Place 1 tablet under the tongue every 5 minutes as needed for Chest pain Yes Prashanth Stevens MD   Multiple Vitamins-Minerals (MULTI FOR HER 50+ PO) Take 1 tablet by mouth daily Yes Historical Provider, MD        Past Medical History:  Past Medical History:   Diagnosis Date    AAA (abdominal aortic aneurysm) ([address]     pt states it is 4cm    AAA (abdominal aortic aneurysm) without rupture ([address] 2/10/2015    Atrial fibrillation ([address]     CAD (coronary artery disease)     CHF (congestive heart failure) ([address]     COPD (chronic obstructive pulmonary disease) ([address]     History of blood clots     Hyperlipidemia     Hypertension        Past Surgical History:   Past Surgical History:   Procedure Laterality Date    ABDOMINAL AORTIC ANEURYSM REPAIR      Endovascular abdominal AA    APPENDECTOMY  1990    incidental    BLADDER SUSPENSION      CATARACT REMOVAL      CHOLECYSTECTOMY  10/15/13    COLONOSCOPY  9/2/07    dr Sandra Suazo and check in 5 years.  COLONOSCOPY  06/16/2017    ok dr arndt, repeat 5 years   [address]    for benign tumor. just the uterus    JOINT REPLACEMENT  12/2013    right knee replacement    TONSILLECTOMY  as a child    TUMOR EXCISION      benign behind right ear about 2008       Social History:   reports that she quit smoking about 8 years ago. Her smoking use included cigarettes. She started smoking about 45 years ago.  She has a 37.00 pack-year smoking history. She has never used smokeless tobacco. She reports that she does not drink alcohol and does not use drugs. Family History:      Problem Relation Age of Onset    Heart Disease Father     Hypertension Other        Review of Systems   Constitutional: Negative for chills, fatigue, fever and unexpected weight change. HENT: Negative for congestion, hearing loss, sinus pressure, sore throat and trouble swallowing. Respiratory: Positive for shortness of breath. Negative for cough and wheezing. Cardiovascular: Negative for chest pain, palpitations and leg swelling. Gastrointestinal: Negative for abdominal pain, blood in stool, constipation, diarrhea, nausea and vomiting. Genitourinary: Negative for hematuria. Musculoskeletal: Negative for arthralgias, back pain, gait problem and myalgias. Skin: Negative for color change, rash and wound. Neurological: Negative for dizziness, seizures, syncope, speech difficulty, weakness and light-headedness. Hematological: Does not bruise/bleed easily. Physical Examination:  Vitals:    04/12/22 1516   BP: 138/84   Pulse: 78   SpO2: 97%      Wt Readings from Last 3 Encounters:   04/12/22 224 lb (101.6 kg)   04/04/22 229 lb 9.6 oz (104.1 kg)   03/21/22 223 lb (101.2 kg)       Physical Exam  Vitals reviewed. Constitutional:       General: She is not in acute distress. Appearance: Normal appearance. HENT:      Head: Normocephalic and atraumatic. Nose: Nose normal.      Mouth/Throat:      Mouth: Mucous membranes are moist.   Eyes:      Conjunctiva/sclera: Conjunctivae normal.      Pupils: Pupils are equal, round, and reactive to light. Cardiovascular:      Rate and Rhythm: Normal rate. Rhythm irregularly irregular. Heart sounds: No murmur heard. No friction rub. No gallop. Pulmonary:      Effort: No respiratory distress. Breath sounds: No wheezing, rhonchi or rales. Abdominal:      General: Abdomen is flat.  Bowel sounds are normal.      Palpations: Abdomen is soft. Musculoskeletal:         General: Normal range of motion. Right lower leg: No edema. Left lower leg: No edema. Skin:     General: Skin is warm and dry. Findings: No bruising. Neurological:      General: No focal deficit present. Mental Status: She is alert and oriented to person, place, and time. Motor: No weakness. Psychiatric:         Mood and Affect: Mood normal.         Behavior: Behavior normal.          Pertinent labs, diagnostic, device, and imaging results reviewed as a part of this visit    LABS    CBC:   Lab Results   Component Value Date    WBC 11.4 (H) 2022    HGB 13.2 2022    HCT 40.2 2022    MCV 88.4 2022     2022     BMP:   Lab Results   Component Value Date    CREATININE 1.3 (H) 2022    BUN 27 (H) 2022     2022    K 4.7 2022    CL 99 2022    CO2 24 2022     Estimated Creatinine Clearance: 43 mL/min (A) (based on SCr of 1.3 mg/dL (H)). No results found for: BNP    Thyroid:   Lab Results   Component Value Date    TSH 0.66 2019     Lipid Panel:   Lab Results   Component Value Date    CHOL 107 09/10/2021    HDL 56 09/10/2021    HDL 38 2011    TRIG 59 09/10/2021     LFTs:  Lab Results   Component Value Date    ALT 12 2021    AST 17 2021    ALKPHOS 92 2021    BILITOT 0.5 2021     Coags:   Lab Results   Component Value Date    PROTIME 26.0 (H) 02/15/2020    INR 2.22 (H) 02/15/2020    APTT 37.2 (H) 02/15/2020       EC2022   Atrial fibrillation at 77 BPM. Non-specific ST-T wave changes. Poor R wave progression. Echo: 9/10/21   Mod conc. LVH with normal LV size & wall motion. EF is    60%. Diastolic   filling parameters suggest grade II diastolic dysfunction. The left atrium is severely dilated. Normal right ventricular size and function.    Trivial mitral, aortic and tricuspid regurgitation.     C: 3/5/18  OVERALL IMPRESSION  1.  Again, 100% proximal right coronary artery occlusion with left to right  collaterals. 2.  Mild disease of the distal left main trunk. 3.  20% to 30% ostial left anterior descending artery stenosis with  collaterals from LAD to the right coronary artery. 4.  30% to 40% stenosis of the proximal circumflex artery. 5.  Normal left ventricular systolic function with estimated EF of 55% to  60%. 6.  Slightly enlarged aortic root with no evidence of aortic stenosis or  Regurgitation.     RHC: 2/28/20  OVERALL IMPRESSION:  1.  _____ normal right cardiac pressure. 2.  Elevated pulmonary capillary wedge pressure with a mean pulmonary  capillary wedge of 29 mmHg. 3.  Normal cardiac output and indices. 4.  No oxygen step off to suggest ASD/PFO.     Stress: 2/19/18  Conclusions          Summary     Abnormal study. There is a moderate sized, mild intensity, partially     reversible defect of the mid to apical anterior and mid anterolateral walls     which is consistent with ischemia. There is breast attenuation but stress     images appear worse.     Normal LV size and systolic function.     Overall findings represent an intermediate risk study.            Procedures:  1. PVI, roof line (for L flutter), anterior line ablation (for L flutter) 10/7/20, Dr. Leeann Baird  2. Successful DCCV for persistent A fib, 1/12/21, Dr. Leeann Baird  3. PVI for L flutter, roof line (for L flutter), anterior wall (L flutter), appendage ablation (for L flutter) and low anterior wall ablation (for L flutter), 2/17/2, Dr. Leeann Baird.    4. Atrial fibrillation (PVI, CAFE), left atrial flutter (roof line) and left atrial tachycardia ablation on 2/4/22, Dr. Leeann Baird       Assessment and Plan:      Persistent atrial fibrillation             - s/p PVI ablation on 10/7/20 with Dr. Leeann Baird, had recurrent A fib noted in January 2021 and had successful DCCV later that month              - s/p PVI again in February 2021 and February 2022    - back in atrial fibrillation on 3/21/22   - on flecainide 100mg BID, Toprol 25mg QD   - CHADS2-VASc 5 (age, gender, HTN, HF, CAD) on Xarelto 20mg QD   - EKG today with rate controlled atrial fibrillation   - discussed treatment options including rate control, medical therapy, cardioversion or repeat ablation, at this point she would like to proceed with cardioversion with Dr. Karen Osborne which will be arranged this week    Left atrial flutter           - seen on EP study s/p roof line and anterior line ablations 10/7/20           - repeat ablation detailed above on 2/17/21    - had recurrent atrial flutter when seen in January 2022 s/p repeat ablation 2/4/22   - on flecainide 100mg BID, Toprol 25mg QD      Chronic diastolic heart failure             - EF 60%             - appears well compensated, SOB likely secondary to A fib     CAD              - stable              - on statin, beta blocker     AAA              - s/p repair in 2018 by Dr. Marylen Penman                - BP controlled            Thank you for allowing to us to participate in the care of [employer]. Follow up after cardioversion.     CHRISTINE Mancuso  [facility] [address] [place] [address]  Phone: [phone]  Fax: [phone]    Electronically signed by CHRISTINE Smith - CNP on 4/12/2022 at 3:37 PM

## 2022-04-12 ENCOUNTER — OFFICE VISIT (OUTPATIENT)
Dept: CARDIOLOGY CLINIC | Age: 77
End: 2022-04-12
Payer: MEDICARE

## 2022-04-12 VITALS
DIASTOLIC BLOOD PRESSURE: 84 MMHG | BODY MASS INDEX: 38.45 KG/M2 | SYSTOLIC BLOOD PRESSURE: 138 MMHG | WEIGHT: 224 LBS | OXYGEN SATURATION: 97 % | HEART RATE: 78 BPM

## 2022-04-12 DIAGNOSIS — I48.92 LEFT ATRIAL FLUTTER BY ELECTROCARDIOGRAM (HCC): ICD-10-CM

## 2022-04-12 DIAGNOSIS — I50.32 CHRONIC DIASTOLIC HEART FAILURE (HCC): ICD-10-CM

## 2022-04-12 DIAGNOSIS — I71.40 AAA (ABDOMINAL AORTIC ANEURYSM) WITHOUT RUPTURE: ICD-10-CM

## 2022-04-12 DIAGNOSIS — I25.10 CORONARY ARTERY DISEASE INVOLVING NATIVE CORONARY ARTERY OF NATIVE HEART WITHOUT ANGINA PECTORIS: ICD-10-CM

## 2022-04-12 DIAGNOSIS — I48.19 PERSISTENT ATRIAL FIBRILLATION (HCC): Primary | ICD-10-CM

## 2022-04-12 DIAGNOSIS — I10 ESSENTIAL HYPERTENSION: ICD-10-CM

## 2022-04-12 PROCEDURE — 1036F TOBACCO NON-USER: CPT | Performed by: NURSE PRACTITIONER

## 2022-04-12 PROCEDURE — 93000 ELECTROCARDIOGRAM COMPLETE: CPT | Performed by: NURSE PRACTITIONER

## 2022-04-12 PROCEDURE — 4040F PNEUMOC VAC/ADMIN/RCVD: CPT | Performed by: NURSE PRACTITIONER

## 2022-04-12 PROCEDURE — G8399 PT W/DXA RESULTS DOCUMENT: HCPCS | Performed by: NURSE PRACTITIONER

## 2022-04-12 PROCEDURE — G8427 DOCREV CUR MEDS BY ELIG CLIN: HCPCS | Performed by: NURSE PRACTITIONER

## 2022-04-12 PROCEDURE — 99214 OFFICE O/P EST MOD 30 MIN: CPT | Performed by: NURSE PRACTITIONER

## 2022-04-12 PROCEDURE — 1090F PRES/ABSN URINE INCON ASSESS: CPT | Performed by: NURSE PRACTITIONER

## 2022-04-12 PROCEDURE — 1123F ACP DISCUSS/DSCN MKR DOCD: CPT | Performed by: NURSE PRACTITIONER

## 2022-04-12 PROCEDURE — G8417 CALC BMI ABV UP PARAM F/U: HCPCS | Performed by: NURSE PRACTITIONER

## 2022-04-12 ASSESSMENT — ENCOUNTER SYMPTOMS
VOMITING: 0
DIARRHEA: 0
SORE THROAT: 0
WHEEZING: 0
SINUS PRESSURE: 0
SHORTNESS OF BREATH: 1
COLOR CHANGE: 0
TROUBLE SWALLOWING: 0
NAUSEA: 0
ABDOMINAL PAIN: 0
BLOOD IN STOOL: 0
BACK PAIN: 0
CONSTIPATION: 0
COUGH: 0

## 2022-04-12 NOTE — PATIENT INSTRUCTIONS
If you have any question regarding your cardioversion or would like to proceed with scheduling please contact Dr. Levon Louis Nurse Doroteo Padilla at [phone]. Cardioversion Pre Procedure Instructions     Date: 4/15/2021    Arrive at: 9 am    Procedure time: 10 am      The morning of your procedure you will park in the [facility] AT [facility] and report directly to the cath lab to check in. At the information desk stay right and go all the way to the end of the bautista, this will take you directly to your check in desk for the cath lab. Pre-Procedure Instructions   1. Do not eat or drink anything after midnight the day of your procedure. 2. Take your  Xarelto  the morning of the procedure with sips of water. 3. You may hold your diuretic Furosemide the morning of the procedure for comfort to decrease urinary urgency. 4. Do not use any lotions, creams or perfume the morning of procedure. 5. Please have a responsible adult to drive you home after procedure. It is recommended you do not drive for 24 hours after procedure and that someone stay with you for precautionary measures. 6. Cath lab will provide you with all post procedure instructions.

## 2022-04-15 ENCOUNTER — HOSPITAL ENCOUNTER (OUTPATIENT)
Dept: CARDIAC CATH/INVASIVE PROCEDURES | Age: 77
Discharge: HOME | End: 2022-04-15
Payer: MEDICARE

## 2022-04-15 VITALS
TEMPERATURE: 97.2 F | DIASTOLIC BLOOD PRESSURE: 65 MMHG | HEART RATE: 68 BPM | WEIGHT: 215 LBS | BODY MASS INDEX: 36.7 KG/M2 | RESPIRATION RATE: 19 BRPM | SYSTOLIC BLOOD PRESSURE: 132 MMHG | HEIGHT: 64 IN | OXYGEN SATURATION: 97 %

## 2022-04-15 LAB
EKG ATRIAL RATE: 59 BPM
EKG DIAGNOSIS: NORMAL
EKG P AXIS: 75 DEGREES
EKG P-R INTERVAL: 252 MS
EKG Q-T INTERVAL: 456 MS
EKG QRS DURATION: 96 MS
EKG QTC CALCULATION (BAZETT): 451 MS
EKG R AXIS: -24 DEGREES
EKG T AXIS: 47 DEGREES
EKG VENTRICULAR RATE: 59 BPM

## 2022-04-15 PROCEDURE — 2500000003 HC RX 250 WO HCPCS

## 2022-04-15 PROCEDURE — 2580000003 HC RX 258

## 2022-04-15 PROCEDURE — 92960 CARDIOVERSION ELECTRIC EXT: CPT

## 2022-04-15 PROCEDURE — 92960 CARDIOVERSION ELECTRIC EXT: CPT | Performed by: INTERNAL MEDICINE

## 2022-04-15 PROCEDURE — 93010 ELECTROCARDIOGRAM REPORT: CPT | Performed by: INTERNAL MEDICINE

## 2022-04-15 PROCEDURE — 93005 ELECTROCARDIOGRAM TRACING: CPT | Performed by: INTERNAL MEDICINE

## 2022-04-15 RX ORDER — SODIUM CHLORIDE 0.9 % (FLUSH) 0.9 %
5-40 SYRINGE (ML) INJECTION EVERY 12 HOURS SCHEDULED
Status: DISCONTINUED | OUTPATIENT
Start: 2022-04-15 | End: 2022-04-16 | Stop reason: HOSPADM

## 2022-04-15 RX ORDER — SODIUM CHLORIDE 0.9 % (FLUSH) 0.9 %
5-40 SYRINGE (ML) INJECTION PRN
Status: DISCONTINUED | OUTPATIENT
Start: 2022-04-15 | End: 2022-04-16 | Stop reason: HOSPADM

## 2022-04-15 RX ORDER — SODIUM CHLORIDE 9 MG/ML
INJECTION, SOLUTION INTRAVENOUS CONTINUOUS
Status: DISCONTINUED | OUTPATIENT
Start: 2022-04-15 | End: 2022-04-16 | Stop reason: HOSPADM

## 2022-04-15 RX ORDER — SODIUM CHLORIDE 9 MG/ML
25 INJECTION, SOLUTION INTRAVENOUS PRN
Status: DISCONTINUED | OUTPATIENT
Start: 2022-04-15 | End: 2022-04-16 | Stop reason: HOSPADM

## 2022-04-15 NOTE — H&P
H&P Update    I have reviewed the history and physical from 2021 and examined the patient and find no relevant changes. I have reviewed with the patient and/or family the risks, benefits, and alternatives to the procedure. Pre-sedation Assessment    Patient:  Mary Rosales   :   1945  Intended Procedure: [facility]      Nurses notes reviewed and agreed. Medications reviewed  Allergies: Allergies   Allergen Reactions    Morphine Rash     rash    Other Rash         Pre-Procedure Assessment/Plan:  ASA 3 - Patient with moderate systemic disease with functional limitations    Level of Sedation Plan: Moderate sedation    Post Procedure plan: Return to same level of care    Maida Patel MD  Cardiac Electrophysiology  [address]

## 2022-04-15 NOTE — PROCEDURES
[facility]     Electrophysiology Procedure Note       Date of Procedure: 4/15/2022  Patient's Name: Cintia Bergeron  YOB: 1945   Medical Record Number: [id number]  Procedure Performed by: Park Condon MD    Procedures performed:  IV sedation. External Electrical cardioversion   Mallampati 3  ASA 3    Indication of the procedure: Persistent Atrial fibrillation and Atrial flutter    Details of procedure: The patient was brought to the cath lab area in a fasting and non-sedated state. The risks, benefits and alternatives of the procedure were discussed with the patient. The patient opted to proceed with the procedure. Written informed consent was signed and placed in the chart. A timeout protocol was completed to identify the patient and the procedure being performed. Patient is on chronic anticoagulation therapy. I pushed 40 mg Brevital. An independent trained observer pushed medications  at my direction. We monitored the patient's level of consciousness and vital signs/physiologic status throughout the procedure duration (see start and stop times below). Sedation:  Brevital   Sedation start: 9:18 am  Sedation stop: 9:20 am     DC cardioversion was perfomred using 200J, synchronized shock. Patient was converted to sinus rhythm at 50s bpm. The patient tolerated the procedure well and there were no complications. Conclusion:   Successful external DC cardioversion of persistent Atrial fibrillation and Atrial flutter    Plan:   The patient can be discharged if remains stable. Will continue with medical therapy.      Park Condon MD  Cardiac Electrophysiology  [facility]

## 2022-05-04 NOTE — PROGRESS NOTES
[address]   Electrophysiology      Date: 5/5/2022    Primary Cardiologist: Mellissa Jung MD  PCP: Doris Watson MD     Chief Complaint:   Chief Complaint   Patient presents with    Follow-up     afib     History of Present Illness:    I saw Cintia Bergeron in the office for electrophysiology follow up today. She is a 69 y.o. female with a past medical history of persistent atrial fibrillation, AAA, CAD, HTN, COPD, PE, DVT and diastolic HF. She underwent EP study with radiofrequency ablation of atrial fibrillation and pulmonary vein isolation, additional ablation with creation of a roof line (for L flutter) and anterior line from mitral annulus to the roof (for L flutter) on 10/7/20 with Dr. Ann España. She had recurrent A fib when seen in the office for follow up in January 2021. She underwent successful DC cardioversion on 1/12/21. She then underwent EP study with RFA of left atrial flutter with pulmonary vein isolation, roof line ablation (for L flutter), anterior wall (L flutter), appendage ablation (for L flutter) and low anterior wall ablation (for L flutter) on 2/17/21 with Dr. Ann España. She had recurrent atrial flutter when seen in the office on 1/25/22. She underwent atrial fibrillation (PVI, CAFE), left atrial flutter (roof line) and left atrial tachycardia ablation on 2/4/22 with Dr. Ann España. She was resumed on flecaindie 50mg BID and Toprol was decreased to 25mg QD. She was back in atrial fibrillation when seen by Evelyn Montanez NP on 3/21/22. She was seen in the office last week and remained in atrial fibrillation. She did not want to schedule any procedures at that time and instead wanted to increase flecainide to 100mg BID. She remained in atrial fibrillation when seen in the office on 4/12/22. She underwent successful cardioversion on 4/15/22 with Dr. Annmarie Blackman. She presents today for follow up. She was initially feeling better for about a week after cardioversion.  Over the last few days she has had more fatigue and dyspnea, she thought it might be secondary to the flecainide so she decreased it to 100mg QD without much improvement. Denies any chest pain or syncope. No edema. No bleeding issues. Allergies: Allergies   Allergen Reactions    Morphine Rash     rash    Other Rash     Home Medications:  Prior to Visit Medications    Medication Sig Taking?  Authorizing Provider   flecainide (TAMBOCOR) 100 MG tablet Take 1 tablet by mouth every 12 hours  Patient taking differently: Take 100 mg by mouth daily  Yes CHRISTINE Vera CNP   NIFEdipine (ADALAT CC) 60 MG extended release tablet TAKE ONE TABLET BY MOUTH DAILY Yes Ag Flanagan MD   lisinopril (PRINIVIL;ZESTRIL) 20 MG tablet Take 1 tablet by mouth daily Yes CHRISTINE Carrington CNP   predniSONE (DELTASONE) 10 MG tablet 40mg for 3days 30mg for 3days 20mg for 3days 10mg for 3days Yes Zechariah Sterling DO   albuterol sulfate  (90 Base) MCG/ACT inhaler INHALE TWO PUFFS BY MOUTH EVERY 4 HOURS AS NEEDED FOR WHEEZING OR FOR SHORTNESS OF BREATH Yes Zechariah Sterling DO   metoprolol succinate (TOPROL XL) 25 MG extended release tablet Take 1 tablet by mouth daily Yes Porsha Hensley MD   furosemide (LASIX) 40 MG tablet Take 1 tablet by mouth daily Yes Porsha Hensley MD   rosuvastatin (CRESTOR) 20 MG tablet Take 1 tablet by mouth daily Yes CHRISTINE Carrington CNP   budesonide-formoterol (SYMBICORT) 160-4.5 MCG/ACT AERO Inhale 2 puffs into the lungs 2 times daily Yes Zechariah Sterling DO   traZODone (DESYREL) 50 MG tablet Take 1 tablet by mouth nightly as needed for Sleep Yes Marcelino Napier MD   rivaroxaban (XARELTO) 20 MG TABS tablet TAKE ONE TABLET BY MOUTH DAILY WITH BREAKFAST Yes Zechariah Sterling DO   clobetasol (TEMOVATE) 0.05 % cream Apply topically 2 times daily as needed for Rash Apply to rash between genital area and buttocks and around anus bid prn x up to 2 weeks -need at least 1 week break prior to restarting Yes Historical Provider, MD   mupirocin (BACTROBAN) 2 % ointment Apply topically 3 times daily Apply to scabs three times daily for 10 days or until healed Yes Historical Provider, MD   triamcinolone (KENALOG) 0.1 % ointment Apply topically 2 times daily as needed for Rash Apply to rash between genital area and buttocks and around anus bid prn during breaks from clobetasol Yes Historical Provider, MD   isosorbide mononitrate (IMDUR) 30 MG extended release tablet TAKE ONE TABLET BY MOUTH DAILY  Patient taking differently: Take 30 mg by mouth daily  Yes Jinny Morgan MD   albuterol (PROVENTIL) (2.5 MG/3ML) 0.083% nebulizer solution Take 3 mLs by nebulization every 4 hours as needed for Wheezing Yes Rocio Randolph, APRN - CNP   nitroGLYCERIN (NITROSTAT) 0.4 MG SL tablet Place 1 tablet under the tongue every 5 minutes as needed for Chest pain Yes Libertad Lemons MD   Multiple Vitamins-Minerals (MULTI FOR HER 50+ PO) Take 1 tablet by mouth daily Yes Historical Provider, MD        Past Medical History:  Past Medical History:   Diagnosis Date    AAA (abdominal aortic aneurysm) ([address]     pt states it is 4cm    AAA (abdominal aortic aneurysm) without rupture ([address] 2/10/2015    Atrial fibrillation ([address]     CAD (coronary artery disease)     CHF (congestive heart failure) ([address]     COPD (chronic obstructive pulmonary disease) ([address]     History of blood clots     Hyperlipidemia     Hypertension        Past Surgical History:   Past Surgical History:   Procedure Laterality Date    ABDOMINAL AORTIC ANEURYSM REPAIR      Endovascular abdominal AA    APPENDECTOMY  1990    incidental    BLADDER SUSPENSION      CATARACT REMOVAL      CHOLECYSTECTOMY  10/15/13    COLONOSCOPY  9/2/07    dr Zuri Rhodes and check in 5 years.  COLONOSCOPY  06/16/2017    ok dr arndt, repeat 5 years   [address]    for benign tumor.  just the uterus    JOINT REPLACEMENT  12/2013    right knee replacement    TONSILLECTOMY  as a child    TUMOR EXCISION      benign behind right ear about 2008       Social History:   reports that she quit smoking about 8 years ago. Her smoking use included cigarettes. She started smoking about 45 years ago. She has a 37.00 pack-year smoking history. She has never used smokeless tobacco. She reports that she does not drink alcohol and does not use drugs. Family History:      Problem Relation Age of Onset    Heart Disease Father     Hypertension Other        Review of Systems   Constitutional: Positive for fatigue. Negative for chills, fever and unexpected weight change. HENT: Negative for congestion, hearing loss, sinus pressure, sore throat and trouble swallowing. Respiratory: Positive for shortness of breath. Negative for cough and wheezing. Cardiovascular: Negative for chest pain, palpitations and leg swelling. Gastrointestinal: Negative for abdominal pain, blood in stool, constipation, diarrhea, nausea and vomiting. Genitourinary: Negative for hematuria. Musculoskeletal: Negative for arthralgias, back pain, gait problem and myalgias. Skin: Negative for color change, rash and wound. Neurological: Negative for dizziness, seizures, syncope, speech difficulty, weakness and light-headedness. Hematological: Does not bruise/bleed easily. Physical Examination:  Vitals:    05/05/22 1352   BP: 124/70   Pulse: 77   SpO2: 97%      Wt Readings from Last 3 Encounters:   05/05/22 223 lb (101.2 kg)   04/15/22 215 lb (97.5 kg)   04/12/22 224 lb (101.6 kg)       Physical Exam  Vitals reviewed. Constitutional:       General: She is not in acute distress. Appearance: Normal appearance. HENT:      Head: Normocephalic and atraumatic. Nose: Nose normal.      Mouth/Throat:      Mouth: Mucous membranes are moist.   Eyes:      Conjunctiva/sclera: Conjunctivae normal.      Pupils: Pupils are equal, round, and reactive to light. Cardiovascular:      Rate and Rhythm: Normal rate. Rhythm irregularly irregular. Heart sounds: No murmur heard. No friction rub. No gallop. Pulmonary:      Effort: No respiratory distress. Breath sounds: No wheezing, rhonchi or rales. Abdominal:      General: Abdomen is flat. Bowel sounds are normal.      Palpations: Abdomen is soft. Musculoskeletal:         General: Normal range of motion. Right lower leg: No edema. Left lower leg: No edema. Skin:     General: Skin is warm and dry. Findings: No bruising. Neurological:      General: No focal deficit present. Mental Status: She is alert and oriented to person, place, and time. Motor: No weakness. Psychiatric:         Mood and Affect: Mood normal.         Behavior: Behavior normal.          Pertinent labs, diagnostic, device, and imaging results reviewed as a part of this visit    LABS    CBC:   Lab Results   Component Value Date    WBC 11.4 (H) 2022    HGB 13.2 2022    HCT 40.2 2022    MCV 88.4 2022     2022     BMP:   Lab Results   Component Value Date    CREATININE 1.3 (H) 2022    BUN 27 (H) 2022     2022    K 4.7 2022    CL 99 2022    CO2 24 2022     Estimated Creatinine Clearance: 43 mL/min (A) (based on SCr of 1.3 mg/dL (H)). No results found for: BNP    Thyroid:   Lab Results   Component Value Date    TSH 0.66 2019     Lipid Panel:   Lab Results   Component Value Date    CHOL 107 09/10/2021    HDL 56 09/10/2021    HDL 38 2011    TRIG 59 09/10/2021     LFTs:  Lab Results   Component Value Date    ALT 12 2021    AST 17 2021    ALKPHOS 92 2021    BILITOT 0.5 2021     Coags:   Lab Results   Component Value Date    PROTIME 26.0 (H) 02/15/2020    INR 2.22 (H) 02/15/2020    APTT 37.2 (H) 02/15/2020       EC2022   Atrial fibrillation at 72 BPM. Anteroseptal infarct. Echo: 9/10/21   Mod conc. LVH with normal LV size & wall motion. EF is    60%. Diastolic   filling parameters suggest grade II diastolic dysfunction. The left atrium is severely dilated. Normal right ventricular size and function. Trivial mitral, aortic and tricuspid regurgitation.     LHC: 3/5/18  OVERALL IMPRESSION  1.  Again, 100% proximal right coronary artery occlusion with left to right  collaterals. 2.  Mild disease of the distal left main trunk. 3.  20% to 30% ostial left anterior descending artery stenosis with  collaterals from LAD to the right coronary artery. 4.  30% to 40% stenosis of the proximal circumflex artery. 5.  Normal left ventricular systolic function with estimated EF of 55% to  60%. 6.  Slightly enlarged aortic root with no evidence of aortic stenosis or  Regurgitation.     RHC: 2/28/20  OVERALL IMPRESSION:  1.  _____ normal right cardiac pressure. 2.  Elevated pulmonary capillary wedge pressure with a mean pulmonary  capillary wedge of 29 mmHg. 3.  Normal cardiac output and indices. 4.  No oxygen step off to suggest ASD/PFO.     Stress: 2/19/18  Conclusions          Summary     Abnormal study. There is a moderate sized, mild intensity, partially     reversible defect of the mid to apical anterior and mid anterolateral walls     which is consistent with ischemia. There is breast attenuation but stress     images appear worse.     Normal LV size and systolic function.     Overall findings represent an intermediate risk study.            Procedures:  1. PVI, roof line (for L flutter), anterior line ablation (for L flutter) 10/7/20, Dr. Olivier Nava  2. Successful DCCV for persistent A fib, 1/12/21, Dr. Olivier Nava  3. PVI for L flutter, roof line (for L flutter), anterior wall (L flutter), appendage ablation (for L flutter) and low anterior wall ablation (for L flutter), 2/17/2, Dr. Olivier Nava. 4. Atrial fibrillation (PVI, CAFE), left atrial flutter (roof line) and left atrial tachycardia ablation on 2/4/22, Dr. Olivier Nava  5.  Successful DCCV for atrial fibrillation, 4/15/22,  [facility]       Assessment and Plan:      Persistent atrial fibrillation             - s/p PVI ablation on 10/7/20 with Dr. Drake Gonsales, had recurrent A fib noted in January 2021 and had successful DCCV later that month              - s/p PVI again in February 2021 and February 2022    - back in atrial fibrillation on 3/21/22   - on flecainide 100mg QD (pt decreased because she was feeling poorly but suspect symptoms were more from recurrent A fib than medications side effects), Toprol 25mg QD   - CHADS2-VASc 5 (age, gender, HTN, HF, CAD) on Xarelto 20mg QD   - s/p DCCV on 4/15/22   - EKG today with rate controlled atrial fibrillation   - discussed options with pt given recurrence while in the blanking period but now outside of blanking period and still with A fib including medical therapy with escalation of antiarrhythmic for rhythm control such as amiodarone or dofetilide, repeat ablation or being left in permanent atrial fibrillation. She is not really wanting another ablation and side effects of other medications concern her, will discuss with  and let us know. If wanting to pursue other AAD or be left in permament A fib, would stop flecainide    Left atrial flutter           - seen on EP study s/p roof line and anterior line ablations 10/7/20           - repeat ablation detailed above on 2/17/21    - had recurrent atrial flutter when seen in January 2022 s/p repeat ablation 2/4/22   - on flecainide 100mg QD, Toprol 25mg QD      Chronic diastolic heart failure             - EF 60%             - appears well compensated, SOB likely secondary to A fib     CAD              - stable              - on statin, beta blocker     AAA              - s/p repair in 2018 by Dr. Reynolds Telephone                - BP controlled            Thank you for allowing to us to participate in the care of Dahiana Botello.     Follow up [facility] in 1 year unless wanting additional ablation or change in medications.     CHRISTINE Chaves  [facility] [address] [place] [address], [address] [place] [address]  Phone: [phone]  Fax: [phone]    Electronically signed by CHRISTINE Lamar - CNP on 5/5/2022 at 2:44 PM

## 2022-05-05 ENCOUNTER — OFFICE VISIT (OUTPATIENT)
Dept: CARDIOLOGY CLINIC | Age: 77
End: 2022-05-05
Payer: MEDICARE

## 2022-05-05 VITALS
HEART RATE: 77 BPM | WEIGHT: 223 LBS | OXYGEN SATURATION: 97 % | SYSTOLIC BLOOD PRESSURE: 124 MMHG | DIASTOLIC BLOOD PRESSURE: 70 MMHG | HEIGHT: 64 IN | BODY MASS INDEX: 38.07 KG/M2

## 2022-05-05 DIAGNOSIS — I50.32 CHRONIC DIASTOLIC HEART FAILURE (HCC): ICD-10-CM

## 2022-05-05 DIAGNOSIS — I71.40 AAA (ABDOMINAL AORTIC ANEURYSM) WITHOUT RUPTURE: ICD-10-CM

## 2022-05-05 DIAGNOSIS — I48.19 PERSISTENT ATRIAL FIBRILLATION (HCC): Primary | ICD-10-CM

## 2022-05-05 DIAGNOSIS — I10 ESSENTIAL HYPERTENSION: ICD-10-CM

## 2022-05-05 DIAGNOSIS — I48.92 LEFT ATRIAL FLUTTER BY ELECTROCARDIOGRAM (HCC): ICD-10-CM

## 2022-05-05 DIAGNOSIS — I25.10 CORONARY ARTERY DISEASE INVOLVING NATIVE CORONARY ARTERY OF NATIVE HEART WITHOUT ANGINA PECTORIS: ICD-10-CM

## 2022-05-05 PROCEDURE — G8417 CALC BMI ABV UP PARAM F/U: HCPCS | Performed by: NURSE PRACTITIONER

## 2022-05-05 PROCEDURE — 93000 ELECTROCARDIOGRAM COMPLETE: CPT | Performed by: NURSE PRACTITIONER

## 2022-05-05 PROCEDURE — 1036F TOBACCO NON-USER: CPT | Performed by: NURSE PRACTITIONER

## 2022-05-05 PROCEDURE — 4040F PNEUMOC VAC/ADMIN/RCVD: CPT | Performed by: NURSE PRACTITIONER

## 2022-05-05 PROCEDURE — 99214 OFFICE O/P EST MOD 30 MIN: CPT | Performed by: NURSE PRACTITIONER

## 2022-05-05 PROCEDURE — 1123F ACP DISCUSS/DSCN MKR DOCD: CPT | Performed by: NURSE PRACTITIONER

## 2022-05-05 PROCEDURE — 1090F PRES/ABSN URINE INCON ASSESS: CPT | Performed by: NURSE PRACTITIONER

## 2022-05-05 PROCEDURE — G8427 DOCREV CUR MEDS BY ELIG CLIN: HCPCS | Performed by: NURSE PRACTITIONER

## 2022-05-05 PROCEDURE — G8399 PT W/DXA RESULTS DOCUMENT: HCPCS | Performed by: NURSE PRACTITIONER

## 2022-05-05 ASSESSMENT — ENCOUNTER SYMPTOMS
SHORTNESS OF BREATH: 1
WHEEZING: 0
SINUS PRESSURE: 0
DIARRHEA: 0
CONSTIPATION: 0
SORE THROAT: 0
VOMITING: 0
COUGH: 0
BACK PAIN: 0
BLOOD IN STOOL: 0
COLOR CHANGE: 0
TROUBLE SWALLOWING: 0
NAUSEA: 0
ABDOMINAL PAIN: 0

## 2022-05-12 ENCOUNTER — TELEPHONE (OUTPATIENT)
Dept: CARDIOLOGY CLINIC | Age: 77
End: 2022-05-12

## 2022-05-12 NOTE — TELEPHONE ENCOUNTER
Pt calling in stating Bascom Cushing told her at her last ov of a medication for afib but pt would be hospitalized to monitor symptoms.  Pt would like a cb to go over from most recent visit    Pt cb [phone]

## 2022-05-12 NOTE — TELEPHONE ENCOUNTER
Discussed patient case with Jorge Teresa CNP. Orders received to have patient come into the office to discuss start on Dofetilide.     Spoke with patient scheduled to see Dr. Bran Esquivel tomorrow at 315 to discuss start on Dofetilide

## 2022-05-13 ENCOUNTER — OFFICE VISIT (OUTPATIENT)
Dept: CARDIOLOGY CLINIC | Age: 77
End: 2022-05-13
Payer: MEDICARE

## 2022-05-13 VITALS
HEIGHT: 64 IN | HEART RATE: 97 BPM | SYSTOLIC BLOOD PRESSURE: 120 MMHG | WEIGHT: 225 LBS | DIASTOLIC BLOOD PRESSURE: 70 MMHG | OXYGEN SATURATION: 92 % | BODY MASS INDEX: 38.41 KG/M2

## 2022-05-13 DIAGNOSIS — I50.32 CHRONIC DIASTOLIC HEART FAILURE (HCC): ICD-10-CM

## 2022-05-13 DIAGNOSIS — I25.10 CORONARY ARTERY DISEASE INVOLVING NATIVE CORONARY ARTERY OF NATIVE HEART WITHOUT ANGINA PECTORIS: ICD-10-CM

## 2022-05-13 DIAGNOSIS — E78.2 MIXED HYPERLIPIDEMIA: ICD-10-CM

## 2022-05-13 DIAGNOSIS — I48.4 ATYPICAL ATRIAL FLUTTER (HCC): ICD-10-CM

## 2022-05-13 DIAGNOSIS — I71.40 AAA (ABDOMINAL AORTIC ANEURYSM) WITHOUT RUPTURE: ICD-10-CM

## 2022-05-13 DIAGNOSIS — I48.19 PERSISTENT ATRIAL FIBRILLATION (HCC): Primary | ICD-10-CM

## 2022-05-13 PROCEDURE — 1090F PRES/ABSN URINE INCON ASSESS: CPT | Performed by: INTERNAL MEDICINE

## 2022-05-13 PROCEDURE — 1123F ACP DISCUSS/DSCN MKR DOCD: CPT | Performed by: INTERNAL MEDICINE

## 2022-05-13 PROCEDURE — 1036F TOBACCO NON-USER: CPT | Performed by: INTERNAL MEDICINE

## 2022-05-13 PROCEDURE — 99215 OFFICE O/P EST HI 40 MIN: CPT | Performed by: INTERNAL MEDICINE

## 2022-05-13 PROCEDURE — G8427 DOCREV CUR MEDS BY ELIG CLIN: HCPCS | Performed by: INTERNAL MEDICINE

## 2022-05-13 PROCEDURE — G8399 PT W/DXA RESULTS DOCUMENT: HCPCS | Performed by: INTERNAL MEDICINE

## 2022-05-13 PROCEDURE — 93000 ELECTROCARDIOGRAM COMPLETE: CPT | Performed by: INTERNAL MEDICINE

## 2022-05-13 PROCEDURE — G8417 CALC BMI ABV UP PARAM F/U: HCPCS | Performed by: INTERNAL MEDICINE

## 2022-05-13 RX ORDER — RANOLAZINE 500 MG/1
500 TABLET, EXTENDED RELEASE ORAL 2 TIMES DAILY
Qty: 60 TABLET | Refills: 3 | Status: ON HOLD
Start: 2022-05-13 | End: 2022-05-26 | Stop reason: HOSPADM

## 2022-05-13 NOTE — PROGRESS NOTES
Cardiac Electrophysiology Consultation     Date: 5/13/2022     Reason for Consultation: Atrial fibrillation & atypical atrial flutter    Consult Requesting Physician: Gopi Rey MD  Primary Care Physician: Alfonso Richter MD    Chief Complaint:   Chief Complaint   Patient presents with    Follow-up     afib - SOB        HPI: Evy Sanchez is a 69 y.o. patient with a history of persistent atrial fibrillation, AAA, CAD, HTN, COPD, PE, DVT and diastolic HF. She underwent EP study with radiofrequency ablation of atrial fibrillation and pulmonary vein isolation, additional ablation with creation of a roof line (for L flutter) and anterior line from mitral annulus to the roof (for L flutter) on 10/7/2020 with Dr. Ranulfo Lindo. She had recurrent A fib when seen in the office for follow up in January 2021. She underwent successful DC cardioversion on 1/12/21. She then underwent EP study with RFA of left atrial flutter with pulmonary vein isolation, roof line ablation (for L flutter), anterior wall (L flutter), appendage ablation (for L flutter) and low anterior wall ablation (for L flutter) on 2/17/2021 with Dr. Ranulfo Lindo. She had recurrent atrial flutter when seen in the office on 1/252022. She underwent atrial fibrillation (PVI, CAFE), left atrial flutter (roof line) and left atrial tachycardia ablation on 2/4/2022 with Dr. Ranulfo Lindo. She was resumed on flecaindie 50mg BID and Toprol was decreased to 25mg daily. She was back in atrial fibrillation when seen by Tino Basilio NP on 3/21/2022. She was seen in on 4/4/2022 by THEE Aguilar CNP and remained in atrial fibrillation. She did not want to schedule any procedures at that time and instead wanted to increase flecainide to 100mg BID. She remained in atrial fibrillation when seen in the office on 4/12/2022 by THEE Aguilar CNP. She underwent successful cardioversion on 4/15/2022. She was then seen in office on 5/5/2022 by THEE Aguilar CNP for post CV follow up and right ear about 2008       Allergies: Allergies   Allergen Reactions    Morphine Rash     rash    Other Rash       Medication:   Prior to Admission medications    Medication Sig Start Date End Date Taking?  Authorizing Provider   NIFEdipine (ADALAT CC) 60 MG extended release tablet TAKE ONE TABLET BY MOUTH DAILY 3/21/22  Yes Chey Lopez MD   lisinopril (PRINIVIL;ZESTRIL) 20 MG tablet Take 1 tablet by mouth daily 3/21/22  Yes Armando Rodrigues APRN - CNP   predniSONE (DELTASONE) 10 MG tablet 40mg for 3days 30mg for 3days 20mg for 3days 10mg for 3days 3/3/22  Yes Blaine Bulla, DO   albuterol sulfate  (90 Base) MCG/ACT inhaler INHALE TWO PUFFS BY MOUTH EVERY 4 HOURS AS NEEDED FOR WHEEZING OR FOR SHORTNESS OF BREATH 2/10/22  Yes Blaine Bulla, DO   metoprolol succinate (TOPROL XL) 25 MG extended release tablet Take 1 tablet by mouth daily 2/4/22  Yes Charbel West MD   furosemide (LASIX) 40 MG tablet Take 1 tablet by mouth daily 2/5/22  Yes Charbel West MD   rosuvastatin (CRESTOR) 20 MG tablet Take 1 tablet by mouth daily 1/25/22  Yes Irasema Hernández APRN - CNP   budesonide-formoterol [facility]) 160-4.5 MCG/ACT AERO Inhale 2 puffs into the lungs 2 times daily 1/13/22  Yes Blaine Bulla, DO   traZODone (DESYREL) 50 MG tablet Take 1 tablet by mouth nightly as needed for Sleep 11/1/21  Yes Gloria Burch MD   rivaroxaban (XARELTO) 20 MG TABS tablet TAKE ONE TABLET BY MOUTH DAILY WITH BREAKFAST 10/20/21  Yes Blaine Bulla, DO   clobetasol (TEMOVATE) 0.05 % cream Apply topically 2 times daily as needed for Rash Apply to rash between genital area and buttocks and around anus bid prn x up to 2 weeks -need at least 1 week break prior to restarting 8/31/21 8/31/22 Yes Historical Provider, MD   mupirocin (BACTROBAN) 2 % ointment Apply topically 3 times daily Apply to scabs three times daily for 10 days or until healed 8/31/21 8/31/22 Yes Historical Provider, MD triamcinolone (KENALOG) 0.1 % ointment Apply topically 2 times daily as needed for Rash Apply to rash between genital area and buttocks and around anus bid prn during breaks from clobetasol 8/31/21 8/31/22 Yes Historical Provider, MD   isosorbide mononitrate (IMDUR) 30 MG extended release tablet TAKE ONE TABLET BY MOUTH DAILY  Patient taking differently: Take 30 mg by mouth daily  8/10/21  Yes Bijal Brasher MD   albuterol (PROVENTIL) (2.5 MG/3ML) 0.083% nebulizer solution Take 3 mLs by nebulization every 4 hours as needed for Wheezing 7/8/21  Yes CHRISTINE Mathew CNP   nitroGLYCERIN (NITROSTAT) 0.4 MG SL tablet Place 1 tablet under the tongue every 5 minutes as needed for Chest pain 3/5/19  Yes Pia Macedo MD   Multiple Vitamins-Minerals (MULTI FOR HER 50+ PO) Take 1 tablet by mouth daily   Yes Historical Provider, MD   flecainide (TAMBOCOR) 100 MG tablet Take 1 tablet by mouth every 12 hours  Patient not taking: Reported on 5/13/2022 4/4/22   Pacocielo CHRISTINE Harmon CNP       Social History:   reports that she quit smoking about 8 years ago. Her smoking use included cigarettes. She started smoking about 45 years ago. She has a 37.00 pack-year smoking history. She has never used smokeless tobacco. She reports that she does not drink alcohol and does not use drugs. Family History:  family history includes Heart Disease in her father; Hypertension in an other family member. Reviewed. Denies family history of sudden cardiac death, arrhythmia, premature CAD    Review of System:  Pertinent positive and negatives are in the HPI, the rest are negative. Physical Examination:  /70 (Site: Right Upper Arm, Position: Sitting)   Pulse 97   Ht 5' 4\" (1.626 m)   Wt 225 lb (102.1 kg)   SpO2 92%   BMI 38.62 kg/m²      · Constitutional: Oriented. No distress. · Head: Normocephalic and atraumatic.    · Mouth/Throat: Oropharynx is clear and moist.   · Eyes: Conjunctivae normal. EOM are normal. · Neck: Normal range of motion. Neck supple. No rigidity. No JVD present. · Cardiovascular: Normal rate, regular rhythm, S1&S2 and intact distal pulses. · Pulmonary/Chest: Bilateral respiratory sounds. No wheezes. No rhonchi. · Abdominal: Soft. Bowel sounds present. No distension, No tenderness. · Musculoskeletal: No tenderness. No edema    · Lymphadenopathy: Has no cervical adenopathy. · Neurological: Alert and oriented. Cranial nerve appears intact, No Gross deficit   · Skin: Skin is warm and dry. No rash noted. · Psychiatric: Has a normal mood, affect and behavior     Labs:  Reviewed. ECG: reviewed, atrial fibrillation with v-rate of 89 bpm with QRS duration 90 ms. No ventricular pre-excitation, or QT prolongation. Studies:   1. Event monitor:     2. Echo: 9/10/2021   Mod conc. LVH with normal LV size & wall motion. EF is 60%. Diastolicfilling parameters suggest grade II diastolic dysfunction. The left atrium is severely dilated. Normal right ventricular size and function. Trivial mitral, aortic and tricuspid regurgitation    3. Stress Test:  2/19/2018  Conclusions          Summary     Abnormal study. There is a moderate sized, mild intensity, partially     reversible defect of the mid to apical anterior and mid anterolateral walls     which is consistent with ischemia. There is breast attenuation but stress     images appear worse.     Normal LV size and systolic function.     Overall findings represent an intermediate risk study.            4. Left Heart Cath: 3/5/2018  OVERALL IMPRESSION  1.  Again, 100% proximal right coronary artery occlusion with left to right collaterals. 2.  Mild disease of the distal left main trunk. 3.  20% to 30% ostial left anterior descending artery stenosis with collaterals from LAD to the right coronary artery. 4.  30% to 40% stenosis of the proximal circumflex artery. 5.  Normal left ventricular systolic function with estimated EF of 55% to60%.   6.  Slightly enlarged aortic root with no evidence of aortic stenosis or regurgitation. 5. Right Heart Cath: 2/28/2020  OVERALL IMPRESSION:  1.  _____ normal right cardiac pressure. 2.  Elevated pulmonary capillary wedge pressure with a mean pulmonary capillary wedge of 29 mmHg. 3.  Normal cardiac output and indices. 4.  No oxygen step off to suggest ASD/PFO    I independently reviewed the ECG, MCOT, echocardiogram, stress test, and coronary angiography/PCI results and used them for my plan of care. [id number] Score for Atrial Fibrillation Stroke Risk   Risk   Factors  Component Value   C CHF Yes 1   H HTN Yes 1   A2 Age >= 76 Yes,  (78 y.o.) 2   D DM No 0   S2 Prior Stroke/TIA No 0   V Vascular Disease No 0   A Age 74-69 No,  (78 y.o.) 0   Sc Sex female 1    [id number]  Score  5   Score last updated 5/13/22 0:44 AM EDT    Click here for a link to the UpToDate guideline \"Atrial Fibrillation: Anticoagulation therapy to prevent embolization    Disclaimer: Risk Score calculation is dependent on accuracy of patient problem list and past encounter diagnosis. Procedures:  1. PVI, roof line (for L flutter), anterior line ablation (for L flutter) 10/7/2020, Dr. Jason Wilcox  2. Successful DCCV for persistent A fib, 1/12/2021, Dr. Jason Wilcox  3. PVI for L flutter, roof line (for L flutter), anterior wall (L flutter), appendage ablation (for L flutter) and low anterior wall ablation (for L flutter), 2/17/2022, Dr. Madeline Galvan  4. Atrial fibrillation (PVI, CAFE), left atrial flutter (roof line) and left atrial tachycardia ablation on 2/4/22, Dr. Jason Wilcox  5.  Successful DCCV for atrial fibrillation, 4/15/2022, Dr. Nikolai Bruno    Assessment/Plan:     Atrial fibrillation & Atypical (left) atrial flutter   -First noted on EKG 6/5/2015.   -EKG today shows atrial fibrillation.   -Symptomatic with shortness of breath.    -She has a [id number] Score 5 (CHF, HTN, AGE)   -Continue Xarelto 20 mg daily for thromboembolic risk reduction.   -Tolerating well no signs or symptoms of abnormal bruising or bleeding. Ms. Angelique Coffey is symptomatic with atrial fibrillation and wouldn't want another ablation procedure. We talked about medical management for now. We will stop imdur that she's taken for a long time and start ranexa since it has some antiarrhythmic properties. She will then come for dofetilide loading.      -Discussed alternative anti arrhythmic medication sotalol which does not require hosptialization and dofetotilde which requires a 3 day hosptial stay when starting to monitor for adverse events and make dose adjustment. · Patient chose to start on Dofetilide. Will arrange inpatient admission on a Tuesday -Thursday. Advised that if she continued to have return of atrial fibrillation/ aflutter with on AAD's SA node ablation with placement of PPM would be a last resort option to control her atrial fibrillation/flutter. Chronic diastolic heart failure   -NYHA class II-III   Per Echo 9/10/2021, grade II diastolic dysfunction. - EF 60%             - appears well compensated, SOB likely secondary to A fib     Coronary artery disease               - Denies angina.              - Continue with medical management and risk factor modification including aspirin, statin, and beta blocker. AAA              - s/p repair in 2018 by Dr. Israel Larson HTN              - BP controlled           -Continue current medical management. Mixed hyperlipidemia   LDL goal < 70   LDL 39 (9/10/2021)   Continue statin therapy, Rosuvastatin 20 mg daily. Follow ups:  -Follow up in 6 months.  -Continue routine follow up with Chung Hercules MD    Thank you for allowing me to participate in the care of Fabiola Carrion. All questions and concerns were addressed to the patient/family. Alternatives to my treatment were discussed. This note was scribed in the presence of Matteo Alfonso MD by Shell Alvarenga RN.     Physician attestation: The scribe's documentation has been prepared under my direction and has been personally reviewed by me in its entirety. I confirm that the note above reflects all work, treatment, procedures, and medical decision making performed by me.      Andrez Abraham MD  Cardiac Electrophysiology  [facility]

## 2022-05-13 NOTE — PATIENT INSTRUCTIONS
Tikosyn/dofetilide        Medication Information for Your Tikosyn®  (Dofetilide) Admission    Why am I going to start dofetilide? You have atrial fibrillation or atrial flutter that may be causing you problems. Tikosyn, also called dofetilide (generic name), is one of the medications used to try to keep you out of atrial fibrillation. Why do I need to come in to the hospital?    - Although dofetilide is safe in most people, there are a small number of people who will have an exaggerated electrical effect from this medication.   -You will be monitored with an EKG(electrocardiogram) 2 hours after each oral dose of dofetilide to make sure there are no EKG changes. - If your doctor suspects that you are having this exaggerated effect or your EKG shows changes (a prolonged QTc), your dose will either be reduced or the medication stopped altogether.    - If you have not converted on your own after 3-5 doses of dofetilide, you may undergo a cardioversion to place you back in a regular rhythm (sinus rhythm) prior to discharge. - After five total doses monitored in the hospital, it is safe for you  to leave the hospital and continue to take the medicine at  home. How do I take dofetilide? - Dofetilide is a pill that you take every 12 hours. - You must be very careful to take it as close to every 12 hours as possible.     -You may have breakthrough of your arrhythmia (atrial fibrillation or atrial flutter) if doses are taken late and not 12 hours apart. What happens if Im late with a dose? - If its within one to two hours of your scheduled time, take it right away and stay on your schedule of every 12 hours    - If you are late by more than one or two hours, skip the dose and take the next one as scheduled. What happens if I skip more than one dose?    - Do not miss more than one dose. - If you miss more than one dose, you must stop taking the medication and let your doctor know.  You will most likely be readmitted to the hospital to restart the medication, just as you    did the first time. - Most people set an alarm on their watches, cell phones or home alarm clocks to ringevery day at the scheduled times to help them remember. - It may help for you to have a pill carrier on your keychain and keep one or two capsules with you at all times. - Do not take an extra dose of dofetilide ever. What are the side effects of dofetilide? - This medicine is very well tolerated. There are very minimal side effects. Talk to your doctor if you think you are having a side effect from this medicine, as it is rare and  unusual.    - Call your doctor right away or seek emergency help if you feel faint, get dizzy or lightheaded, or have a fast or abnormal heartbeat sensation different from your atrial  fibrillation. Is there anything I should look out for?    - Let your doctor know if you have any of the following:    Severe diarrhea  Heavy, profuse sweating  Vomiting  Dramatic change in urination (urinating too much or too little). - Call the office if you have vomiting or diarrhea as this can cause dehydration and   potentially be an issue with the medication.       - You MUST get an EKG and blood tests at least every six months, or, in some cases, every three months. Make sure to contact your doctors office if it has been longer  than six months since your last EKG or blood test. We want your potassium level above 4.0 and your magnesium level above 2.0. You may be prescribed potassium or magnesium   supplements to keep these levels. Are there any medicines I should avoid? - Look over the following list of medications carefully. Notify your doctor if you aretaking one of these medicines.  You should stop these medicines two days before  your scheduled hospitalization:    - Cimetidine (Tagamet®, Tagamet HB®)  - Verapamil (Calan®, Calan SR®, Covera-HS®, Isoptin®, Isoptin SR®, Nayeli Mage PM®, Tarka®)  - Ketoconazole (Anna Fothergill, Extina®)  - Trimethoprim alone (Proloprim®, Trimpex®) or the combination of     trimethoprim and sulfamethoxazole (Bactrim®, Septra®, Sulfatrim®)  - Prochlorperazine (Compazine®, Compro®)  - Megestrol (Megace®)  - Hydrochlorothiazide alone or in combination with other medicines (Esidrix®,    Ezide®, Hydrodiuril®, Hydro-Par®, Microzide®, Oretic®)    - If you receive any new prescriptions, please make sure the prescribing doctor is aware you are on dofetilide (Tikosyn) to check for drug-drug interactions. You may   check with our office at [phone] to review any new prescriptions prior to starting.     - Please check any new medications including over the counter medications via the website www. [employer]. org. Can I stop taking blood thinners? - In general, if you were on a blood thinner before starting dofetilide, you should  remain on one at all times, even if you believe you are no longer in atrial fibrillation. What will happen during my hospitalization?    - You will receive an EKG and blood test before your arrival in the hospital.    - Once you arrive, your hospital team will start you on dofetilide. (It is a capsule.)    - You will get an EKG two hours after each dose, for a total of five doses. - On the third or fifth day (physician preference), you may have a cardioversion (electrical shock) to try to get you   out of atrial fibrillation. (In some cases, you will have a cardioversion before starting  the medication. )    - You will most likely go home on the third day. Be sure to follow up with your doctor as scheduled, no later than three months after starting the medicine. Plan to have  someone drive you home. Who should I call for more questions?     - Call our office and ask to speak to the heart rhythm team, including Dr. Wendy Copeland CNP at [phone].    - Call the [address] [place] [address] hotline at [phone] ([phone]). Where can I get this medication? - Unfortunately, only a limited number of pharmacies stock this medication. Call your regular pharmacy and ask if they are able to order the medication.    - In most cases, you will leave the hospital with a 1 week free supply of dofetilide in hand. This will allow your pharmacy time to fill your prescription. Do not wait to drop off your  prescription to your outpatient pharmacy to allow them time to order the medication in. In addition, a prescription will be sent to your home pharmacy. For more about Jennifer Crowe, go to www.[employer] or call [phone]-ORYELJK ([phone]). -----------------------------------------------------------------------------------------------------------------------------          Dofetilide Admission      Date of Admission: ______________________    Time of Arrival: _________________________    Admitting Cardiologist: Dr. Tamir Cheema      · Arrive at [facility] [place] through the main entrance. Check in at the main desk. Be prepared to show photo ID and insurance card. · Be prepared for a 3 day admission to the hospital.  If you do not convert to a normal rhythm with medication alone, you will have an external cardioversion on the 2nd day. · Do NOT take your routine medications the morning of the admission with the exception of your blood thinner. · Do NOT stop taking aspirin, Plavix, coumadin, Eliquis, Xarelto, or Pradaxa (any blood thinners) leading up to the admission. If fact, you should take your blood thinner the morning of admission. · Please bring a list of your medications to the hospital with you. · You must have someone available to drive you home when you are discharged (if you need a cardioversion). It is recommended that you do not drive for 24 hours after the potential cardioversion.     · If you are unable to make this appointment, please call [address] [place] [address] Cardiology at [phone].

## 2022-05-20 ENCOUNTER — TELEPHONE (OUTPATIENT)
Dept: CARDIOLOGY CLINIC | Age: 77
End: 2022-05-20

## 2022-05-20 NOTE — TELEPHONE ENCOUNTER
Spoke with [facility], CNP on 5/19/2022 ok for admission for start on Tikosyn. I spoke with [id number] coordinator today and she confimred mary ellen we are a go for admission on Tues and that patient should be admitted to ICU bed. I called patient and advised of date and time of admission for start on Tikosyn patient agreeable. Instructed to present to the reception desk at [facility] on tues at 8 am I advised that the patient transfer center would also be in contact with her regarding arrival for admission. I instructed her not to take her morning medications except her Xarelto if she typically takes in the Am.

## 2022-05-24 ENCOUNTER — HOSPITAL ENCOUNTER (INPATIENT)
Age: 77
LOS: 2 days | Discharge: HOME | DRG: 309 | End: 2022-05-26
Attending: INTERNAL MEDICINE | Admitting: INTERNAL MEDICINE
Payer: MEDICARE

## 2022-05-24 DIAGNOSIS — I48.19 PERSISTENT ATRIAL FIBRILLATION (HCC): Primary | ICD-10-CM

## 2022-05-24 PROBLEM — I48.91 ATRIAL FIBRILLATION (HCC): Status: ACTIVE | Noted: 2022-05-24

## 2022-05-24 LAB
ANION GAP SERPL CALCULATED.3IONS-SCNC: 11 MMOL/L (ref 3–16)
BUN BLDV-MCNC: 28 MG/DL (ref 7–20)
CALCIUM SERPL-MCNC: 9.4 MG/DL (ref 8.3–10.6)
CHLORIDE BLD-SCNC: 100 MMOL/L (ref 99–110)
CO2: 27 MMOL/L (ref 21–32)
CREAT SERPL-MCNC: 1.5 MG/DL (ref 0.6–1.2)
EKG ATRIAL RATE: 72 BPM
EKG DIAGNOSIS: NORMAL
EKG DIAGNOSIS: NORMAL
EKG P AXIS: 91 DEGREES
EKG Q-T INTERVAL: 398 MS
EKG Q-T INTERVAL: 414 MS
EKG QRS DURATION: 86 MS
EKG QRS DURATION: 92 MS
EKG QTC CALCULATION (BAZETT): 447 MS
EKG QTC CALCULATION (BAZETT): 450 MS
EKG R AXIS: -28 DEGREES
EKG R AXIS: -43 DEGREES
EKG T AXIS: -57 DEGREES
EKG T AXIS: 28 DEGREES
EKG VENTRICULAR RATE: 70 BPM
EKG VENTRICULAR RATE: 77 BPM
GFR AFRICAN AMERICAN: 41
GFR NON-AFRICAN AMERICAN: 34
GLUCOSE BLD-MCNC: 109 MG/DL (ref 70–99)
HCT VFR BLD CALC: 37.4 % (ref 36–48)
HEMOGLOBIN: 12.3 G/DL (ref 12–16)
MAGNESIUM: 2.1 MG/DL (ref 1.8–2.4)
MCH RBC QN AUTO: 29.5 PG (ref 26–34)
MCHC RBC AUTO-ENTMCNC: 32.8 G/DL (ref 31–36)
MCV RBC AUTO: 89.7 FL (ref 80–100)
PDW BLD-RTO: 16.1 % (ref 12.4–15.4)
PLATELET # BLD: 193 K/UL (ref 135–450)
PMV BLD AUTO: 9.1 FL (ref 5–10.5)
POTASSIUM SERPL-SCNC: 4 MMOL/L (ref 3.5–5.1)
RBC # BLD: 4.17 M/UL (ref 4–5.2)
SODIUM BLD-SCNC: 138 MMOL/L (ref 136–145)
WBC # BLD: 9.5 K/UL (ref 4–11)

## 2022-05-24 PROCEDURE — 2000000000 HC ICU R&B

## 2022-05-24 PROCEDURE — 85027 COMPLETE CBC AUTOMATED: CPT

## 2022-05-24 PROCEDURE — 94760 N-INVAS EAR/PLS OXIMETRY 1: CPT

## 2022-05-24 PROCEDURE — 80048 BASIC METABOLIC PNL TOTAL CA: CPT

## 2022-05-24 PROCEDURE — 93005 ELECTROCARDIOGRAM TRACING: CPT | Performed by: INTERNAL MEDICINE

## 2022-05-24 PROCEDURE — 94640 AIRWAY INHALATION TREATMENT: CPT

## 2022-05-24 PROCEDURE — 83735 ASSAY OF MAGNESIUM: CPT

## 2022-05-24 PROCEDURE — 93005 ELECTROCARDIOGRAM TRACING: CPT | Performed by: NURSE PRACTITIONER

## 2022-05-24 PROCEDURE — 36415 COLL VENOUS BLD VENIPUNCTURE: CPT

## 2022-05-24 PROCEDURE — 93010 ELECTROCARDIOGRAM REPORT: CPT | Performed by: INTERNAL MEDICINE

## 2022-05-24 PROCEDURE — 2580000003 HC RX 258: Performed by: NURSE PRACTITIONER

## 2022-05-24 PROCEDURE — 6370000000 HC RX 637 (ALT 250 FOR IP): Performed by: NURSE PRACTITIONER

## 2022-05-24 PROCEDURE — 99223 1ST HOSP IP/OBS HIGH 75: CPT | Performed by: NURSE PRACTITIONER

## 2022-05-24 RX ORDER — MULTIVITAMIN WITH IRON
1 TABLET ORAL DAILY
Status: DISCONTINUED | OUTPATIENT
Start: 2022-05-24 | End: 2022-05-26 | Stop reason: HOSPADM

## 2022-05-24 RX ORDER — METOPROLOL SUCCINATE 25 MG/1
25 TABLET, EXTENDED RELEASE ORAL DAILY
Status: DISCONTINUED | OUTPATIENT
Start: 2022-05-24 | End: 2022-05-24

## 2022-05-24 RX ORDER — NIFEDIPINE 60 MG/1
60 TABLET, EXTENDED RELEASE ORAL DAILY
Status: DISCONTINUED | OUTPATIENT
Start: 2022-05-24 | End: 2022-05-26 | Stop reason: HOSPADM

## 2022-05-24 RX ORDER — SODIUM CHLORIDE 0.9 % (FLUSH) 0.9 %
5-40 SYRINGE (ML) INJECTION PRN
Status: DISCONTINUED | OUTPATIENT
Start: 2022-05-24 | End: 2022-05-26 | Stop reason: HOSPADM

## 2022-05-24 RX ORDER — POLYETHYLENE GLYCOL 3350 17 G/17G
17 POWDER, FOR SOLUTION ORAL DAILY PRN
Status: DISCONTINUED | OUTPATIENT
Start: 2022-05-24 | End: 2022-05-26 | Stop reason: HOSPADM

## 2022-05-24 RX ORDER — SODIUM CHLORIDE 9 MG/ML
INJECTION, SOLUTION INTRAVENOUS PRN
Status: DISCONTINUED | OUTPATIENT
Start: 2022-05-24 | End: 2022-05-26 | Stop reason: HOSPADM

## 2022-05-24 RX ORDER — ROSUVASTATIN CALCIUM 20 MG/1
20 TABLET, COATED ORAL DAILY
Status: DISCONTINUED | OUTPATIENT
Start: 2022-05-24 | End: 2022-05-26 | Stop reason: HOSPADM

## 2022-05-24 RX ORDER — POTASSIUM CHLORIDE 7.45 MG/ML
10 INJECTION INTRAVENOUS PRN
Status: DISCONTINUED | OUTPATIENT
Start: 2022-05-24 | End: 2022-05-26 | Stop reason: HOSPADM

## 2022-05-24 RX ORDER — POTASSIUM CHLORIDE 20 MEQ/1
40 TABLET, EXTENDED RELEASE ORAL PRN
Status: DISCONTINUED | OUTPATIENT
Start: 2022-05-24 | End: 2022-05-26 | Stop reason: HOSPADM

## 2022-05-24 RX ORDER — ACETAMINOPHEN 325 MG/1
650 TABLET ORAL EVERY 6 HOURS PRN
Status: DISCONTINUED | OUTPATIENT
Start: 2022-05-24 | End: 2022-05-26 | Stop reason: HOSPADM

## 2022-05-24 RX ORDER — DOFETILIDE 0.25 MG/1
250 CAPSULE ORAL 2 TIMES DAILY
Status: DISCONTINUED | OUTPATIENT
Start: 2022-05-24 | End: 2022-05-26 | Stop reason: HOSPADM

## 2022-05-24 RX ORDER — SODIUM CHLORIDE 0.9 % (FLUSH) 0.9 %
5-40 SYRINGE (ML) INJECTION EVERY 12 HOURS SCHEDULED
Status: DISCONTINUED | OUTPATIENT
Start: 2022-05-24 | End: 2022-05-26 | Stop reason: HOSPADM

## 2022-05-24 RX ORDER — FUROSEMIDE 40 MG/1
40 TABLET ORAL DAILY
Status: DISCONTINUED | OUTPATIENT
Start: 2022-05-24 | End: 2022-05-26 | Stop reason: HOSPADM

## 2022-05-24 RX ORDER — ALBUTEROL SULFATE 2.5 MG/3ML
2.5 SOLUTION RESPIRATORY (INHALATION) EVERY 4 HOURS PRN
Status: DISCONTINUED | OUTPATIENT
Start: 2022-05-24 | End: 2022-05-26 | Stop reason: HOSPADM

## 2022-05-24 RX ORDER — MAGNESIUM SULFATE IN WATER 40 MG/ML
2000 INJECTION, SOLUTION INTRAVENOUS PRN
Status: DISCONTINUED | OUTPATIENT
Start: 2022-05-24 | End: 2022-05-26 | Stop reason: HOSPADM

## 2022-05-24 RX ORDER — LISINOPRIL 20 MG/1
20 TABLET ORAL DAILY
Status: DISCONTINUED | OUTPATIENT
Start: 2022-05-24 | End: 2022-05-26 | Stop reason: HOSPADM

## 2022-05-24 RX ORDER — ACETAMINOPHEN 650 MG/1
650 SUPPOSITORY RECTAL EVERY 6 HOURS PRN
Status: DISCONTINUED | OUTPATIENT
Start: 2022-05-24 | End: 2022-05-26 | Stop reason: HOSPADM

## 2022-05-24 RX ADMIN — LISINOPRIL 20 MG: 20 TABLET ORAL at 13:20

## 2022-05-24 RX ADMIN — MOMETASONE FUROATE AND FORMOTEROL FUMARATE DIHYDRATE 2 PUFF: 200; 5 AEROSOL RESPIRATORY (INHALATION) at 20:45

## 2022-05-24 RX ADMIN — DOFETILIDE 250 MCG: 0.25 CAPSULE ORAL at 21:27

## 2022-05-24 RX ADMIN — Medication 10 ML: at 13:24

## 2022-05-24 RX ADMIN — DOFETILIDE 250 MCG: 0.25 CAPSULE ORAL at 11:48

## 2022-05-24 RX ADMIN — Medication 10 ML: at 21:29

## 2022-05-24 RX ADMIN — METOPROLOL TARTRATE 12.5 MG: 25 TABLET, FILM COATED ORAL at 21:27

## 2022-05-24 RX ADMIN — THERA TABS 1 TABLET: TAB at 13:20

## 2022-05-24 RX ADMIN — MOMETASONE FUROATE AND FORMOTEROL FUMARATE DIHYDRATE 2 PUFF: 200; 5 AEROSOL RESPIRATORY (INHALATION) at 12:00

## 2022-05-24 RX ADMIN — ROSUVASTATIN CALCIUM 20 MG: 20 TABLET, FILM COATED ORAL at 13:20

## 2022-05-24 RX ADMIN — FUROSEMIDE 40 MG: 40 TABLET ORAL at 13:21

## 2022-05-24 RX ADMIN — NIFEDIPINE 60 MG: 60 TABLET, EXTENDED RELEASE ORAL at 13:20

## 2022-05-24 ASSESSMENT — PAIN SCALES - GENERAL
PAINLEVEL_OUTOF10: 0

## 2022-05-24 NOTE — PROGRESS NOTES
Medication Reconciliation    List of medications patient is currently taking is complete.     Source(s) of information:   Conversation with patient/family at bedside  EPIC records     Notes regarding home medications:   Flecainide stopped 5/13/22      Geetha León [facility], PharmD, [facility]

## 2022-05-24 NOTE — CARE COORDINATION
INITIAL CASE MANAGEMENT ASSESSMENT    Reviewed chart, met with patient,  & daughter to assess possible discharge needs. Explained Case Management role/services. Living Situation: Confirmed address, lives with  in a mobile home, 1 step    ADLs: Independent,  completes cleaning and assists with cooking     DME: [employer]    PT/OT Recs: Not ordered     Active Services: None     Transportation: Patient is able to drive but has  transport PRN     Medications: Uses [employer] in [place] -- no issues obtaining/affording medications    PCP: Hailee Moran MD      HD/PD: n/a    PLAN/COMMENTS: Patient will return home with family support. No anticipated needs. SW/CM provided contact information for patient or family to call with any questions. SW/CM will follow and assist as needed.   Electronically signed by Leonard Chapman RN Case Management [phone] on 5/24/2022 at 4:14 PM

## 2022-05-24 NOTE — ACP (ADVANCE CARE PLANNING)
Advance Care Planning     Advance Care Planning Activator (Inpatient)  Conversation Note      Date of ACP Conversation: 5/24/2022     Conversation Conducted with: Patient with Decision Making Capacity    ACP Activator: Wylie Gosselin, RN    Health Care Decision Maker:     Current Designated Health Care Decision Maker:     Primary Decision Maker: Albaro Sanz Spouse - [phone]    Secondary Decision Maker: Wanda Ruiz Child - [phone]    Care Preferences    Ventilation: \"If you were in your present state of health and suddenly became very ill and were unable to breathe on your own, what would your preference be about the use of a ventilator (breathing machine) if it were available to you? \"      Would the patient desire the use of ventilator (breathing machine)?: yes    \"If your health worsens and it becomes clear that your chance of recovery is unlikely, what would your preference be about the use of a ventilator (breathing machine) if it were available to you? \"     Would the patient desire the use of ventilator (breathing machine)?: No      Resuscitation  \"CPR works best to restart the heart when there is a sudden event, like a heart attack, in someone who is otherwise healthy. Unfortunately, CPR does not typically restart the heart for people who have serious health conditions or who are very sick. \"    \"In the event your heart stopped as a result of an underlying serious health condition, would you want attempts to be made to restart your heart (answer \"yes\" for attempt to resuscitate) or would you prefer a natural death (answer \"no\" for do not attempt to resuscitate)? \" yes       [] Yes   [x] No   Educated Patient / Wendy Hansen regarding differences between Advance Directives and portable DNR orders.     Length of ACP Conversation in minutes:  5 minutes    Conversation Outcomes:  [x] ACP discussion completed  [] Existing advance directive reviewed with patient; no changes to patient's previously recorded wishes  [] New Advance Directive completed  [] Portable Do Not Rescitate prepared for Provider review and signature  [] POLST/POST/MOLST/MOST prepared for Provider review and signature      Follow-up plan:    [] Schedule follow-up conversation to continue planning  [] Referred individual to Provider for additional questions/concerns   [] Advised patient/agent/surrogate to review completed ACP document and update if needed with changes in condition, patient preferences or care setting    [x] This note routed to one or more involved healthcare providers  Electronically signed by Jackie Gutierrez RN Case Management [phone] on 5/24/2022 at 4:03 PM

## 2022-05-24 NOTE — PROGRESS NOTES
Pt arrived via wheelchair with her , alert and oriented, ambulates to bed from wheel chair with even steady gait. Belongings at bedside. CABRERA España provided patient educate about intended plan of care for the day. Patient and  verbalized understanding and stated no questions at this time. Oriented to room and call light, personal belongings within reach and patient dressing self in gown and socks. Denies any needs at this time and no needs anticipated. Will complete assessment and vital signs. Electronically signed by Lyndon Donovan RN on 5/24/2022 at 8:03 AM      VSS, afebrile, skin intact with scattered bruising, belongings within reach, and denies further needs.    Orders released and in [facility], repositioned for comfort    Electronically signed by Lyndon Donovan RN on 5/24/2022 at 8:33 AM

## 2022-05-24 NOTE — H&P
Cardiac Electrophysiology H&P    Admit Date:  5/24/2022  Admission Diagnosis: Atrial fibrillation [facility]) [I48.91]     Admitting Physician: Keyshawn Ireland MD       History of Present Illness  Ralph Lee is a 69y.o. year old female with past medical history significant for persistent atrial fibrillation, AAA, CAD, HTN, COPD, PE, DVT and diastolic HF. She underwent EP study with radiofrequency ablation of atrial fibrillation and pulmonary vein isolation, additional ablation with creation of a roof line (for L flutter) and anterior line from mitral annulus to the roof (for L flutter) on 10/7/20 with Dr. Jamie Romero had recurrent A fib when seen in the office for follow up in January 2021. She underwent successful DC cardioversion on 1/12/21. She then underwent EP study with RFA of left atrial flutter with pulmonary vein isolation, roof line ablation (for L flutter), anterior wall (L flutter), appendage ablation (for L flutter) and low anterior wall ablation (for L flutter) on 2/17/21 with Dr. Rafy Michaels. She had recurrent atrial flutter when seen in the office on 1/25/22. She underwent atrial fibrillation (PVI, CAFE), left atrial flutter (roof line) and left atrial tachycardia ablation on 2/4/22 with Dr. Rafy Michaels. She was resumed on flecainide 50mg BID and Toprol was decreased to 25mg QD. She was back in atrial fibrillation when seen by Cassandra Song NP on 3/21/22. She was seen in the office last week and remained in atrial fibrillation. She did not want to schedule any procedures at that time and instead wanted to increase flecainide to 100mg BID. She remained in atrial fibrillation when seen in the office on 4/12/22. She underwent successful cardioversion on 4/15/22 with Dr. Lucia Bae but was back in atrial fibrillation when seen in the office on 5/5/22. We discussed treatment options as she feels poorly in atrial fibrillation and she opted for admission with dofetilide loading.  She continues to have significant LI with just walking short distances like across a room. Still with fatigue. No chest pain or palpitations. Past Medical History:   Diagnosis Date    AAA (abdominal aortic aneurysm) ([facility] [address]     pt states it is 4cm    AAA (abdominal aortic aneurysm) without rupture (HCC) 2/10/2015    Atrial fibrillation (HCC)     CAD (coronary artery disease)     CHF (congestive heart failure) ([facility] [address]     COPD (chronic obstructive pulmonary disease) (HCC)     History of blood clots     Hyperlipidemia     Hypertension         Past Surgical History:   Procedure Laterality Date    ABDOMINAL AORTIC ANEURYSM REPAIR      Endovascular abdominal AA    APPENDECTOMY  1990    incidental    BLADDER SUSPENSION      CATARACT REMOVAL      CHOLECYSTECTOMY  10/15/13    COLONOSCOPY  9/2/07    dr Ady Cerda and check in 5 years.  COLONOSCOPY  06/16/2017    ok dr arndt, repeat 5 years   [address]    for benign tumor.  just the uterus    JOINT REPLACEMENT  12/2013    right knee replacement    TONSILLECTOMY  as a child    TUMOR EXCISION      benign behind right ear about 2008       Current Outpatient Medications   Medication Instructions    albuterol (PROVENTIL) 2.5 mg, Nebulization, EVERY 4 HOURS PRN    albuterol sulfate  (90 Base) MCG/ACT inhaler INHALE TWO PUFFS BY MOUTH EVERY 4 HOURS AS NEEDED FOR WHEEZING OR FOR SHORTNESS OF BREATH    budesonide-formoterol (SYMBICORT) 160-4.5 MCG/ACT AERO 2 puffs, Inhalation, 2 TIMES DAILY    clobetasol (TEMOVATE) 0.05 % cream Topical, 2 TIMES DAILY PRN, Apply to rash between genital area and buttocks and around anus bid prn x up to 2 weeks -need at least 1 week break prior to restarting    furosemide (LASIX) 40 mg, Oral, DAILY    lisinopril (PRINIVIL;ZESTRIL) 20 mg, Oral, DAILY    metoprolol succinate (TOPROL XL) 25 mg, Oral, DAILY    Multiple Vitamins-Minerals (MULTI FOR HER 50+ PO) 1 tablet, Oral, DAILY    mupirocin (BACTROBAN) 2 % ointment Topical, 3 TIMES DAILY, Apply to scabs three times daily for 10 days or until healed    NIFEdipine (ADALAT CC) 60 MG extended release tablet TAKE ONE TABLET BY MOUTH DAILY    nitroGLYCERIN (NITROSTAT) 0.4 mg, SubLINGual, EVERY 5 MIN PRN    ranolazine (RANEXA) 500 mg, Oral, 2 TIMES DAILY    rivaroxaban (XARELTO) 20 MG TABS tablet TAKE ONE TABLET BY MOUTH DAILY WITH BREAKFAST    rosuvastatin (CRESTOR) 20 mg, Oral, DAILY    triamcinolone (KENALOG) 0.1 % ointment Topical, 2 TIMES DAILY PRN, Apply to rash between genital area and buttocks and around anus bid prn during breaks from clobetasol        Allergies   Allergen Reactions    Morphine Rash     rash       Social History:   reports that she quit smoking about 8 years ago. Her smoking use included cigarettes. She started smoking about 45 years ago. She has a 37.00 pack-year smoking history. She has never used smokeless tobacco. She reports that she does not drink alcohol and does not use drugs. Family History:  family history includes Heart Disease in her father; Hypertension in an other family member. Review of Systems:  · General: positive for fatigue, negative for fever, chills   · Ophthalmic ROS: negative for eye pain or loss of vision  · ENT ROS: negative for headaches, sore throat, nasal drainage  · Respiratory: positive for LI, negative for cough, sputum  · Cardiovascular: negative for chest pain, palpitations.   · Gastrointestinal: negative for abdominal pain, diarrhea, N/V  · Hematology: negative for bleeding, blood clots, bruising or jaundice  · Genito-Urinary:  negative for dysuria or incontinence  · Musculoskeletal: negative for joint swelling, muscle pain  · Neurological: negative for confusion, dizziness, headaches   · Psychiatric: negative anxiety, depression  · Dermatological: negative for rash    Medications:  Scheduled Meds:   sodium chloride flush  5-40 mL IntraVENous 2 times per day    rivaroxaban  20 mg Oral Daily    mometasone-formoterol  2 puff Inhalation BID  furosemide  40 mg Oral Daily    lisinopril  20 mg Oral Daily    Multi For Her 50+  1 tablet Oral Daily    NIFEdipine  1 tablet Oral Daily    rosuvastatin  20 mg Oral Daily    dofetilide  250 mcg Oral BID      Continuous Infusions:   sodium chloride       PRN Meds:.sodium chloride flush, sodium chloride, potassium chloride **OR** potassium alternative oral replacement **OR** potassium chloride, magnesium sulfate, acetaminophen **OR** acetaminophen, polyethylene glycol, albuterol     Physical Examination:  Vitals:    22 0835   BP:    Pulse:    Resp: 15   Temp:    SpO2: 98%      No intake or output data in the 24 hours ending 22 1033  No intake/output data recorded. Wt Readings from Last 3 Encounters:   22 216 lb 4.3 oz (98.1 kg)   22 225 lb (102.1 kg)   22 223 lb (101.2 kg)     Temp  Av.5 °F (36.4 °C)  Min: 97.5 °F (36.4 °C)  Max: 97.5 °F (36.4 °C)  Pulse  Av.5  Min: 79  Max: 80  BP  Min: 140/73  Max: 140/73  SpO2  Av %  Min: 96 %  Max: 100 %    · Telemetry: Atrial flutter v-rates 70s. · Constitutional: Alert, in no acute distress. Appears stated age. · Head: Normocephalic and atraumatic. · Eyes: Conjunctivae normal. EOM are normal.   · Neck: Neck supple. No lymphadenopathy. No rigidity. No JVD present. · Cardiovascular: Controlled rate, IRR. No murmurs, rubs or gallops. No S3 or S4.  · Pulmonary/Chest: Clear breath sounds bilaterally. No crackles, wheezes or rhonchi. No respiratory accessory muscle use. · Abdominal: Soft. Normal bowel sounds present. No distension, No tenderness. · Musculoskeletal: No tenderness. No edema    · Lymphadenopathy: Has no cervical adenopathy. · Neurological: Alert and oriented. No gross deficits. · Skin: Skin is warm and dry. No rash, lesions, ulcerations noted. · Psychiatric: No anxiety nor agitation. Labs:  Reviewed.    Recent Labs     22  0909      K 4.0      CO2 27   BUN 28*   CREATININE 1.5* Recent Labs     22  0909   WBC 9.5   HGB 12.3   HCT 37.4   MCV 89.7        Lab Results   Component Value Date    TROPONINI <0.01 2021     No results found for: BNP  Lab Results   Component Value Date    PROTIME 26.0 02/15/2020    PROTIME 11.3 2018    PROTIME 11.7 2018    INR 2.22 02/15/2020    INR 1.00 2018    INR 1.04 2018     Lab Results   Component Value Date    CHOL 107 09/10/2021    HDL 56 09/10/2021    HDL 38 2011    TRIG 59 09/10/2021       Diagnostic and imaging results reviewed. EC22  Atrial flutter at 77 BPM. PVC. LAD. Echo: 9/10/21   Mod conc. LVH with normal LV size & wall motion. EF is    60%. Diastolic   filling parameters suggest grade II diastolic dysfunction.   The left atrium is severely dilated.   Normal right ventricular size and function.   Trivial mitral, aortic and tricuspid regurgitation.     LHC: 3/5/18  OVERALL IMPRESSION  1.  Again, 100% proximal right coronary artery occlusion with left to right  collaterals. 2.  Mild disease of the distal left main trunk. 3.  20% to 30% ostial left anterior descending artery stenosis with  collaterals from LAD to the right coronary artery. 4.  30% to 40% stenosis of the proximal circumflex artery. 5.  Normal left ventricular systolic function with estimated EF of 55% to  60%. 6.  Slightly enlarged aortic root with no evidence of aortic stenosis or  Regurgitation.     RHC: 20  OVERALL IMPRESSION:  1.  _____ normal right cardiac pressure. 2.  Elevated pulmonary capillary wedge pressure with a mean pulmonary  capillary wedge of 29 mmHg. 3.  Normal cardiac output and indices. 4.  No oxygen step off to suggest ASD/PFO.     Stress: 18  Conclusions          Summary     Abnormal study. There is a moderate sized, mild intensity, partially     reversible defect of the mid to apical anterior and mid anterolateral walls     which is consistent with ischemia.  There is breast attenuation but stress     images appear worse.     Normal LV size and systolic function.     Overall findings represent an intermediate risk study.            Procedures:  1. PVI, roof line (for L flutter), anterior line ablation (for L flutter) 10/7/20, Dr. Ralph Leblanc  2. Successful DCCV for persistent A fib, 1/12/21, Dr. Ralph Leblanc  3. PVI for L flutter, roof line (for L flutter), anterior wall (L flutter), appendage ablation (for L flutter) and low anterior wall ablation (for L flutter), 2/17/2, Dr. Jd Souza  4. Atrial fibrillation (PVI, CAFE), left atrial flutter (roof line) and left atrial tachycardia ablation on 2/4/22, Dr. Ralph Leblanc  5.  Successful DCCV for atrial fibrillation, 4/15/22, Dr. Compa Yung:    Persistent atrial fibrillation             - s/p PVI ablation on 10/7/20 with Dr. Ralph Leblanc, had recurrent A fib noted in January 2021 and had successful DCCV later that month              - s/p PVI again in February 2021 and February 2022           - back in atrial fibrillation on 3/21/22           - s/p DCCV on 4/15/22, recurrence of A fib on EKG on 5/5/22     - CHADS2-VASc 5 (age, gender, HTN, HF, CAD) on Xarelto 20mg QD - continue and watch CrCl, if it stays <50, will need to decrease to 15mg QD   - stop Ranexa, hold metoprolol for now   - baseline QTc 450ms, CrCl 49mL/min - will start dofetilide 250mcg BID   - EKG 2-3 hours after every dose, if QTc increases to above 500ms, will need to decrease dose to 125mcg BID   - daily BMP, Mg - keep K >4 and Mg >2     Left atrial flutter           - seen on EP study s/p roof line and anterior line ablations 10/7/20           - repeat ablation detailed above on 2/17/21            - had recurrent atrial flutter when seen in January 2022 s/p repeat ablation 2/4/22           - see above                Chronic diastolic heart failure             - EF 60%             - appears well compensated, SOB likely secondary to A fib/flutter     CAD              - stable              -

## 2022-05-25 ENCOUNTER — TELEPHONE (OUTPATIENT)
Dept: CARDIOLOGY CLINIC | Age: 77
End: 2022-05-25

## 2022-05-25 LAB
ANION GAP SERPL CALCULATED.3IONS-SCNC: 15 MMOL/L (ref 3–16)
BUN BLDV-MCNC: 26 MG/DL (ref 7–20)
CALCIUM SERPL-MCNC: 9.2 MG/DL (ref 8.3–10.6)
CHLORIDE BLD-SCNC: 99 MMOL/L (ref 99–110)
CO2: 24 MMOL/L (ref 21–32)
CREAT SERPL-MCNC: 1.5 MG/DL (ref 0.6–1.2)
EKG ATRIAL RATE: 104 BPM
EKG ATRIAL RATE: 192 BPM
EKG DIAGNOSIS: NORMAL
EKG DIAGNOSIS: NORMAL
EKG P AXIS: 77 DEGREES
EKG P AXIS: 86 DEGREES
EKG Q-T INTERVAL: 396 MS
EKG Q-T INTERVAL: 452 MS
EKG QRS DURATION: 86 MS
EKG QRS DURATION: 90 MS
EKG QTC CALCULATION (BAZETT): 462 MS
EKG QTC CALCULATION (BAZETT): 508 MS
EKG R AXIS: -33 DEGREES
EKG R AXIS: -35 DEGREES
EKG T AXIS: 37 DEGREES
EKG T AXIS: 45 DEGREES
EKG VENTRICULAR RATE: 76 BPM
EKG VENTRICULAR RATE: 82 BPM
GFR AFRICAN AMERICAN: 41
GFR NON-AFRICAN AMERICAN: 34
GLUCOSE BLD-MCNC: 102 MG/DL (ref 70–99)
MAGNESIUM: 2.2 MG/DL (ref 1.8–2.4)
POTASSIUM SERPL-SCNC: 4.4 MMOL/L (ref 3.5–5.1)
SODIUM BLD-SCNC: 138 MMOL/L (ref 136–145)

## 2022-05-25 PROCEDURE — 80048 BASIC METABOLIC PNL TOTAL CA: CPT

## 2022-05-25 PROCEDURE — 36415 COLL VENOUS BLD VENIPUNCTURE: CPT

## 2022-05-25 PROCEDURE — 93010 ELECTROCARDIOGRAM REPORT: CPT | Performed by: INTERNAL MEDICINE

## 2022-05-25 PROCEDURE — 94761 N-INVAS EAR/PLS OXIMETRY MLT: CPT

## 2022-05-25 PROCEDURE — 83735 ASSAY OF MAGNESIUM: CPT

## 2022-05-25 PROCEDURE — 2000000000 HC ICU R&B

## 2022-05-25 PROCEDURE — 94760 N-INVAS EAR/PLS OXIMETRY 1: CPT

## 2022-05-25 PROCEDURE — 94640 AIRWAY INHALATION TREATMENT: CPT

## 2022-05-25 PROCEDURE — 93005 ELECTROCARDIOGRAM TRACING: CPT | Performed by: NURSE PRACTITIONER

## 2022-05-25 PROCEDURE — 6370000000 HC RX 637 (ALT 250 FOR IP): Performed by: NURSE PRACTITIONER

## 2022-05-25 PROCEDURE — 2580000003 HC RX 258: Performed by: NURSE PRACTITIONER

## 2022-05-25 PROCEDURE — 99233 SBSQ HOSP IP/OBS HIGH 50: CPT | Performed by: NURSE PRACTITIONER

## 2022-05-25 RX ORDER — SODIUM CHLORIDE 9 MG/ML
INJECTION, SOLUTION INTRAVENOUS PRN
Status: DISCONTINUED | OUTPATIENT
Start: 2022-05-25 | End: 2022-05-26 | Stop reason: HOSPADM

## 2022-05-25 RX ORDER — SODIUM CHLORIDE 0.9 % (FLUSH) 0.9 %
5-40 SYRINGE (ML) INJECTION PRN
Status: DISCONTINUED | OUTPATIENT
Start: 2022-05-25 | End: 2022-05-26 | Stop reason: HOSPADM

## 2022-05-25 RX ORDER — SODIUM CHLORIDE 0.9 % (FLUSH) 0.9 %
5-40 SYRINGE (ML) INJECTION EVERY 12 HOURS SCHEDULED
Status: DISCONTINUED | OUTPATIENT
Start: 2022-05-25 | End: 2022-05-26 | Stop reason: HOSPADM

## 2022-05-25 RX ADMIN — Medication 10 ML: at 09:19

## 2022-05-25 RX ADMIN — Medication 10 ML: at 21:24

## 2022-05-25 RX ADMIN — ACETAMINOPHEN 650 MG: 325 TABLET ORAL at 23:30

## 2022-05-25 RX ADMIN — FUROSEMIDE 40 MG: 40 TABLET ORAL at 09:18

## 2022-05-25 RX ADMIN — DOFETILIDE 250 MCG: 0.25 CAPSULE ORAL at 08:21

## 2022-05-25 RX ADMIN — LISINOPRIL 20 MG: 20 TABLET ORAL at 09:18

## 2022-05-25 RX ADMIN — MOMETASONE FUROATE AND FORMOTEROL FUMARATE DIHYDRATE 2 PUFF: 200; 5 AEROSOL RESPIRATORY (INHALATION) at 08:27

## 2022-05-25 RX ADMIN — METOPROLOL TARTRATE 12.5 MG: 25 TABLET, FILM COATED ORAL at 09:18

## 2022-05-25 RX ADMIN — NIFEDIPINE 60 MG: 60 TABLET, EXTENDED RELEASE ORAL at 09:18

## 2022-05-25 RX ADMIN — THERA TABS 1 TABLET: TAB at 09:18

## 2022-05-25 RX ADMIN — METOPROLOL TARTRATE 12.5 MG: 25 TABLET, FILM COATED ORAL at 21:26

## 2022-05-25 RX ADMIN — ROSUVASTATIN CALCIUM 20 MG: 20 TABLET, FILM COATED ORAL at 09:19

## 2022-05-25 RX ADMIN — MOMETASONE FUROATE AND FORMOTEROL FUMARATE DIHYDRATE 2 PUFF: 200; 5 AEROSOL RESPIRATORY (INHALATION) at 20:33

## 2022-05-25 RX ADMIN — RIVAROXABAN 20 MG: 20 TABLET, FILM COATED ORAL at 07:59

## 2022-05-25 RX ADMIN — DOFETILIDE 250 MCG: 0.25 CAPSULE ORAL at 18:18

## 2022-05-25 ASSESSMENT — PAIN SCALES - GENERAL
PAINLEVEL_OUTOF10: 0

## 2022-05-25 NOTE — PLAN OF CARE
Problem: Discharge Planning  Goal: Discharge to home or other facility with appropriate resources  Outcome: Progressing     Problem: Safety - Adult  Goal: Free from fall injury  Outcome: Progressing  Flowsheets (Taken 5/25/2022 0114)  Free From Fall Injury: Instruct family/caregiver on patient safety     Problem: ABCDS Injury Assessment  Goal: Absence of physical injury  Outcome: Progressing  Flowsheets (Taken 5/25/2022 0114)  Absence of Physical Injury: Implement safety measures based on patient assessment

## 2022-05-25 NOTE — TELEPHONE ENCOUNTER
[facility] ICU preformed EKG and results were QTC was 503, one prior was 462 and was instructed to contact cardiologist if it ran above 500, please advise    Cb [phone] for Daquan [employer] Charan

## 2022-05-25 NOTE — PROGRESS NOTES
Patient's EKG showed QTc of 503 and later 508. RN called the [address] [place] [address] and left a message saying QTc is above 500. Patient has no complaints and VSS. 12:59- NP Polleys called to discuss EKG. Plan is to continue medication dosage as is, she and MD Ackerman measured actual QTc to be 480.

## 2022-05-25 NOTE — PROGRESS NOTES
Cardiac Electrophysiology Progress Note     Admit Date: 2022     Reason for follow up: atrial fibrillation/flutter    HPI and Interval History:   Hampton Bumpers is a 69y.o. year old female with past medical history significant for persistent atrial fibrillation s/p ablations/cardioversions, atrial flutter s/p ablations/cardioversions, AAA, CAD, HTN, COPD, PE, DVT and diastolic HF who was admitted to the hospital for dofetilide initiation. Started on dofetilide 250mcg BID yesterday. QTc has been appropriate. Remains in atrial flutter, rate controlled. She is feeling well, has been walking around the unit without any dyspnea. Physical Examination:  Vitals:    22 0900   BP: (!) 141/88   Pulse: 85   Resp:    Temp:    SpO2:         Intake/Output Summary (Last 24 hours) at 2022 1114  Last data filed at 2022 0811  Gross per 24 hour   Intake 1640 ml   Output --   Net 1640 ml     In: 1640 [P.O.:1640]  Out: -    Wt Readings from Last 3 Encounters:   22 222 lb 0.1 oz (100.7 kg)   22 225 lb (102.1 kg)   22 223 lb (101.2 kg)     Temp  Av.9 °F (36.6 °C)  Min: 97.4 °F (36.3 °C)  Max: 98.3 °F (36.8 °C)  Pulse  Av.3  Min: 72  Max: 110  BP  Min: 104/86  Max: 155/86  SpO2  Av.4 %  Min: 80 %  Max: 98 %    · Telemetry: Atrial flutter v-rates 70s. · Constitutional: Alert, in no acute distress. Appears stated age. · Head: Normocephalic and atraumatic. · Eyes: Conjunctivae normal. EOM are normal.   · Neck: Neck supple. No lymphadenopathy. No rigidity. No JVD present. · Cardiovascular: Controlled rate, IRR. No murmurs, rubs or gallops. No S3 or S4.  · Pulmonary/Chest: Clear breath sounds bilaterally. No crackles, wheezes or rhonchi. No respiratory accessory muscle use. · Abdominal: Soft. Normal bowel sounds present. No distension, No tenderness. · Musculoskeletal: No tenderness. No edema    · Lymphadenopathy: Has no cervical adenopathy.    · Neurological: Alert and oriented. No gross deficits. · Skin: Skin is warm and dry. No rash, lesions, ulcerations noted. · Psychiatric: No anxiety or agitation. Labs, diagnostic and imaging results reviewed. Reviewed. Recent Labs     22  0909 22  0431    138   K 4.0 4.4    99   CO2 27 24   BUN 28* 26*   CREATININE 1.5* 1.5*     Recent Labs     22  0909   WBC 9.5   HGB 12.3   HCT 37.4   MCV 89.7        Lab Results   Component Value Date    TROPONINI <0.01 2021     Estimated Creatinine Clearance: 37 mL/min (A) (based on SCr of 1.5 mg/dL (H)). No results found for: BNP  Lab Results   Component Value Date    PROTIME 26.0 02/15/2020    PROTIME 11.3 2018    PROTIME 11.7 2018    INR 2.22 02/15/2020    INR 1.00 2018    INR 1.04 2018     Lab Results   Component Value Date    CHOL 107 09/10/2021    HDL 56 09/10/2021    HDL 38 2011    TRIG 59 09/10/2021       Scheduled Meds:   sodium chloride flush  5-40 mL IntraVENous 2 times per day    mometasone-formoterol  2 puff Inhalation BID    furosemide  40 mg Oral Daily    lisinopril  20 mg Oral Daily    multivitamin  1 tablet Oral Daily    NIFEdipine  60 mg Oral Daily    rosuvastatin  20 mg Oral Daily    dofetilide  250 mcg Oral BID    metoprolol tartrate  12.5 mg Oral BID    rivaroxaban  20 mg Oral Daily with breakfast     Continuous Infusions:   sodium chloride       PRN Meds:sodium chloride flush, sodium chloride, potassium chloride **OR** potassium alternative oral replacement **OR** potassium chloride, magnesium sulfate, acetaminophen **OR** acetaminophen, polyethylene glycol, albuterol     EC22  Atrial flutter at 77 BPM. PVC. LAD.      Echo: 9/10/21   Mod conc. LVH with normal LV size & wall motion. EF is    60%.  Diastolic   filling parameters suggest grade II diastolic dysfunction.   The left atrium is severely dilated.   Normal right ventricular size and function.   Trivial mitral, aortic and tricuspid regurgitation.     LHC: 3/5/18  OVERALL IMPRESSION  1.  Again, 100% proximal right coronary artery occlusion with left to right  collaterals. 2.  Mild disease of the distal left main trunk. 3.  20% to 30% ostial left anterior descending artery stenosis with  collaterals from LAD to the right coronary artery. 4.  30% to 40% stenosis of the proximal circumflex artery. 5.  Normal left ventricular systolic function with estimated EF of 55% to  60%. 6.  Slightly enlarged aortic root with no evidence of aortic stenosis or  Regurgitation.     RHC: 2/28/20  OVERALL IMPRESSION:  1.  _____ normal right cardiac pressure. 2.  Elevated pulmonary capillary wedge pressure with a mean pulmonary  capillary wedge of 29 mmHg. 3.  Normal cardiac output and indices. 4.  No oxygen step off to suggest ASD/PFO.     Stress: 2/19/18  Conclusions          Summary     Abnormal study. There is a moderate sized, mild intensity, partially     reversible defect of the mid to apical anterior and mid anterolateral walls     which is consistent with ischemia. There is breast attenuation but stress     images appear worse.     Normal LV size and systolic function.     Overall findings represent an intermediate risk study.            Procedures:  1. PVI, roof line (for L flutter), anterior line ablation (for L flutter) 10/7/20, Dr. Flavio Rose  2. Successful DCCV for persistent A fib, 1/12/21, Dr. Flavio Rose  3. PVI for L flutter, roof line (for L flutter), anterior wall (L flutter), appendage ablation (for L flutter) and low anterior wall ablation (for L flutter), 2/17/2, Dr. Rory Angel  4. Atrial fibrillation (PVI, CAFE), left atrial flutter (roof line) and left atrial tachycardia ablation on 2/4/22, Dr. Flavio Rose  5.  Successful DCCV for atrial fibrillation, 4/15/22, Dr. Carmen Case and Plan:     Persistent atrial fibrillation             - s/p PVI ablation on 10/7/20 with Dr. Flavio Roes, had recurrent A fib noted in January 2021 and had successful DCCV later that month              - s/p PVI again in February 2021 and February 2022           - back in atrial fibrillation on 3/21/22           - s/p DCCV on 4/15/22, recurrence of A fib on EKG on 5/5/22                - CHADS2-VASc 5 (age, gender, HTN, HF, CAD) on Xarelto 20mg QD - continue and watch CrCl, if it stays <50, will need to decrease to 15mg QD              - baseline QTc 450ms, CrCl 49mL/min - on dofetilide 250mcg BID   - today's EKG with QTc of 502ms on EKG but my calculations show it is around 480ms, will continue current dose              - EKG 2-3 hours after every dose, if QTc increases to above 500ms, will need to decrease dose to 125mcg BID              - daily BMP, Mg - keep K >4 and Mg >2   - plan for cardioversion in AM     Left atrial flutter           - seen on EP study s/p roof line and anterior line ablations 10/7/20           - repeat ablation detailed above on 2/17/21            - had recurrent atrial flutter when seen in January 2022 s/p repeat ablation 2/4/22           - see above                Chronic diastolic heart failure             - EF 60%             - appears well compensated, SOB likely secondary to A fib/flutter     CAD              - stable              - on statin, beta blocker     AAA              - s/p repair in 2018 by Dr. Esther Vieyra           - continue medical therapy      CHRISTINE Williamson  [facility] [address] [place] [address]  Phone: [phone]  Fax: [phone]    Electronically signed by CHRISTINE Brennan - CNP on 5/25/2022 at 11:14 AM

## 2022-05-25 NOTE — PLAN OF CARE
Problem: Discharge Planning  Goal: Discharge to home or other facility with appropriate resources  5/25/2022 1451 by Librado Gonzalez RN  Outcome: Progressing  Flowsheets (Taken 5/25/2022 0750)  Discharge to home or other facility with appropriate resources: Identify barriers to discharge with patient and caregiver     Problem: Safety - Adult  Goal: Free from fall injury  5/25/2022 1451 by Librado Gonzalez RN  Outcome: Progressing  Flowsheets (Taken 5/25/2022 1448)  Free From Fall Injury:   Instruct family/caregiver on patient safety   Based on caregiver fall risk screen, instruct family/caregiver to ask for assistance with transferring infant if caregiver noted to have fall risk factors     Problem: ABCDS Injury Assessment  Goal: Absence of physical injury  5/25/2022 1451 by Librado Gonzalez RN  Outcome: Progressing  Flowsheets (Taken 5/25/2022 1448)  Absence of Physical Injury: Implement safety measures based on patient assessment     Problem: Pain  Goal: Verbalizes/displays adequate comfort level or baseline comfort level  Outcome: Progressing  Flowsheets (Taken 5/25/2022 1215)  Verbalizes/displays adequate comfort level or baseline comfort level:   Assess pain using appropriate pain scale   Encourage patient to monitor pain and request assistance     Problem: Neurosensory - Adult  Goal: Achieves stable or improved neurological status  Outcome: Progressing  Goal: Absence of seizures  Outcome: Progressing  Goal: Remains free of injury related to seizures activity  Outcome: Progressing  Goal: Achieves maximal functionality and self care  Outcome: Progressing     Problem: Respiratory - Adult  Goal: Achieves optimal ventilation and oxygenation  Outcome: Progressing     Problem: Cardiovascular - Adult  Goal: Maintains optimal cardiac output and hemodynamic stability  Outcome: Progressing  Goal: Absence of cardiac dysrhythmias or at baseline  Outcome: Progressing     Problem: Skin/Tissue Integrity - Adult  Goal: Skin integrity remains intact  Outcome: Progressing  Goal: Incisions, wounds, or drain sites healing without S/S of infection  Outcome: Progressing  Goal: Oral mucous membranes remain intact  Outcome: Progressing     Problem: Musculoskeletal - Adult  Goal: Return mobility to safest level of function  Outcome: Progressing  Goal: Maintain proper alignment of affected body part  Outcome: Progressing  Goal: Return ADL status to a safe level of function  Outcome: Progressing     Problem: Gastrointestinal - Adult  Goal: Minimal or absence of nausea and vomiting  Outcome: Progressing  Goal: Maintains or returns to baseline bowel function  Outcome: Progressing  Goal: Maintains adequate nutritional intake  Outcome: Progressing  Goal: Establish and maintain optimal ostomy function  Outcome: Progressing     Problem: Genitourinary - Adult  Goal: Absence of urinary retention  Outcome: Progressing  Goal: Urinary catheter remains patent  Outcome: Progressing     Problem: Infection - Adult  Goal: Absence of infection at discharge  Outcome: Progressing  Goal: Absence of infection during hospitalization  Outcome: Progressing  Goal: Absence of fever/infection during anticipated neutropenic period  Outcome: Progressing     Problem: Metabolic/Fluid and Electrolytes - Adult  Goal: Electrolytes maintained within normal limits  Outcome: Progressing  Goal: Hemodynamic stability and optimal renal function maintained  Outcome: Progressing  Goal: Glucose maintained within prescribed range  Outcome: Progressing     Problem: Hematologic - Adult  Goal: Maintains hematologic stability  Outcome: Progressing

## 2022-05-26 VITALS
RESPIRATION RATE: 18 BRPM | DIASTOLIC BLOOD PRESSURE: 59 MMHG | HEIGHT: 64 IN | WEIGHT: 216.49 LBS | SYSTOLIC BLOOD PRESSURE: 121 MMHG | TEMPERATURE: 97.7 F | HEART RATE: 54 BPM | OXYGEN SATURATION: 96 % | BODY MASS INDEX: 36.96 KG/M2

## 2022-05-26 LAB
ANION GAP SERPL CALCULATED.3IONS-SCNC: 13 MMOL/L (ref 3–16)
BUN BLDV-MCNC: 29 MG/DL (ref 7–20)
CALCIUM SERPL-MCNC: 8.9 MG/DL (ref 8.3–10.6)
CHLORIDE BLD-SCNC: 99 MMOL/L (ref 99–110)
CO2: 25 MMOL/L (ref 21–32)
CREAT SERPL-MCNC: 1.6 MG/DL (ref 0.6–1.2)
GFR AFRICAN AMERICAN: 38
GFR NON-AFRICAN AMERICAN: 31
GLUCOSE BLD-MCNC: 100 MG/DL (ref 70–99)
MAGNESIUM: 2 MG/DL (ref 1.8–2.4)
POTASSIUM SERPL-SCNC: 3.9 MMOL/L (ref 3.5–5.1)
SODIUM BLD-SCNC: 137 MMOL/L (ref 136–145)

## 2022-05-26 PROCEDURE — 94640 AIRWAY INHALATION TREATMENT: CPT

## 2022-05-26 PROCEDURE — 6370000000 HC RX 637 (ALT 250 FOR IP): Performed by: NURSE PRACTITIONER

## 2022-05-26 PROCEDURE — 94760 N-INVAS EAR/PLS OXIMETRY 1: CPT

## 2022-05-26 PROCEDURE — 80048 BASIC METABOLIC PNL TOTAL CA: CPT

## 2022-05-26 PROCEDURE — 93005 ELECTROCARDIOGRAM TRACING: CPT | Performed by: INTERNAL MEDICINE

## 2022-05-26 PROCEDURE — 93005 ELECTROCARDIOGRAM TRACING: CPT | Performed by: NURSE PRACTITIONER

## 2022-05-26 PROCEDURE — 2580000003 HC RX 258: Performed by: NURSE PRACTITIONER

## 2022-05-26 PROCEDURE — 36415 COLL VENOUS BLD VENIPUNCTURE: CPT

## 2022-05-26 PROCEDURE — 99239 HOSP IP/OBS DSCHRG MGMT >30: CPT | Performed by: NURSE PRACTITIONER

## 2022-05-26 PROCEDURE — 83735 ASSAY OF MAGNESIUM: CPT

## 2022-05-26 RX ORDER — DOFETILIDE 0.25 MG/1
250 CAPSULE ORAL EVERY 12 HOURS
Qty: 60 CAPSULE | Refills: 5 | Status: SHIPPED | OUTPATIENT
Start: 2022-05-26 | End: 2022-06-27 | Stop reason: SDUPTHER

## 2022-05-26 RX ADMIN — RIVAROXABAN 20 MG: 20 TABLET, FILM COATED ORAL at 08:42

## 2022-05-26 RX ADMIN — METOPROLOL TARTRATE 12.5 MG: 25 TABLET, FILM COATED ORAL at 08:42

## 2022-05-26 RX ADMIN — LISINOPRIL 20 MG: 20 TABLET ORAL at 08:42

## 2022-05-26 RX ADMIN — MOMETASONE FUROATE AND FORMOTEROL FUMARATE DIHYDRATE 2 PUFF: 200; 5 AEROSOL RESPIRATORY (INHALATION) at 09:00

## 2022-05-26 RX ADMIN — THERA TABS 1 TABLET: TAB at 08:43

## 2022-05-26 RX ADMIN — NIFEDIPINE 60 MG: 60 TABLET, EXTENDED RELEASE ORAL at 08:43

## 2022-05-26 RX ADMIN — ROSUVASTATIN CALCIUM 20 MG: 20 TABLET, FILM COATED ORAL at 08:43

## 2022-05-26 RX ADMIN — DOFETILIDE 250 MCG: 0.25 CAPSULE ORAL at 06:02

## 2022-05-26 RX ADMIN — Medication 10 ML: at 08:43

## 2022-05-26 RX ADMIN — ACETAMINOPHEN 650 MG: 325 TABLET ORAL at 06:08

## 2022-05-26 ASSESSMENT — PAIN - FUNCTIONAL ASSESSMENT: PAIN_FUNCTIONAL_ASSESSMENT: ACTIVITIES ARE NOT PREVENTED

## 2022-05-26 ASSESSMENT — PAIN DESCRIPTION - DESCRIPTORS: DESCRIPTORS: ACHING

## 2022-05-26 ASSESSMENT — PAIN SCALES - GENERAL
PAINLEVEL_OUTOF10: 3
PAINLEVEL_OUTOF10: 3

## 2022-05-26 ASSESSMENT — PAIN DESCRIPTION - LOCATION
LOCATION: BACK
LOCATION: BACK

## 2022-05-26 ASSESSMENT — PAIN DESCRIPTION - ORIENTATION: ORIENTATION: MID;LOWER

## 2022-05-26 NOTE — PROGRESS NOTES
Discharge instructions reviewed with patient and her . PIV removed. Belongings packed and given to patient. Awaiting medication delivery from retail pharmacy.

## 2022-05-26 NOTE — DISCHARGE SUMMARY
DISCHARGE SUMMARY      Patient ID:  Odette Funes  [id number] y.o. 1945    Admit date: 5/24/2022    Discharge date:  05/26/22    Admitting Physician: Efrain Medina MD     Discharge Physician: CHRISTINE Prado CNP     Admission Diagnoses: Atrial fibrillation [facility]) [I48.91]    Discharge Diagnoses: Atrial flutter, atrial fibrillation     Discharged Condition: good    Hospital Course: Odette Funes was admitted on 5/24/22 for dofetilide loading after having multiple ablations, cardioversions and failing flecainide therapy. She was started on 250mcg of dofetilide Q12H. Her QTc was monitored closely without any significant changes or need for dose adjustment. Electrolytes also monitored daily and replaced if needed. She did convert to sinus rhythm on 5/25/22 with improvement in symptoms. QTc remains acceptable today. She will be discharged on 250mcg of dofetilide Q12H with a BMP in 1 week.      Consults:  None    Labs:   Lab Results   Component Value Date    CREATININE 1.6 (H) 05/26/2022    BUN 29 (H) 05/26/2022     05/26/2022    K 3.9 05/26/2022    CL 99 05/26/2022    CO2 25 05/26/2022      Lab Results   Component Value Date    WBC 9.5 05/24/2022    HGB 12.3 05/24/2022    HCT 37.4 05/24/2022    MCV 89.7 05/24/2022     05/24/2022      Lab Results   Component Value Date    INR 2.22 (H) 02/15/2020    PROTIME 26.0 (H) 02/15/2020    No results found for: BNP    Disposition: home    Patient Instructions:   Current Discharge Medication List           Details   dofetilide (TIKOSYN) 250 mcg capsule Take 1 capsule by mouth every 12 hours  Qty: 60 capsule, Refills: 5              Details   lisinopril (PRINIVIL;ZESTRIL) 20 MG tablet Take 1 tablet by mouth daily  Qty: 30 tablet, Refills: 3    Associated Diagnoses: Essential hypertension      albuterol sulfate  (90 Base) MCG/ACT inhaler INHALE TWO PUFFS BY MOUTH EVERY 4 HOURS AS NEEDED FOR WHEEZING OR FOR SHORTNESS OF BREATH  Qty: 1 each, Refills: 3      metoprolol succinate (TOPROL XL) 25 MG extended release tablet Take 1 tablet by mouth daily  Qty: 30 tablet, Refills: 3      furosemide (LASIX) 40 MG tablet Take 1 tablet by mouth daily  Qty: 90 tablet, Refills: 1      rosuvastatin (CRESTOR) 20 MG tablet Take 1 tablet by mouth daily  Qty: 90 tablet, Refills: 2    Associated Diagnoses: Hyperlipidemia LDL goal <70      budesonide-formoterol (SYMBICORT) 160-4.5 MCG/ACT AERO Inhale 2 puffs into the lungs 2 times daily  Qty: 1 each, Refills: 5      rivaroxaban (XARELTO) 20 MG TABS tablet TAKE ONE TABLET BY MOUTH DAILY WITH BREAKFAST  Qty: 30 tablet, Refills: 11      clobetasol (TEMOVATE) 0.05 % cream Apply topically 2 times daily as needed for Rash Apply to rash between genital area and buttocks and around anus bid prn x up to 2 weeks -need at least 1 week break prior to restarting      mupirocin (BACTROBAN) 2 % ointment Apply topically 3 times daily Apply to scabs three times daily for 10 days or until healed      triamcinolone (KENALOG) 0.1 % ointment Apply topically 2 times daily as needed for Rash Apply to rash between genital area and buttocks and around anus bid prn during breaks from clobetasol      albuterol (PROVENTIL) (2.5 MG/3ML) 0.083% nebulizer solution Take 3 mLs by nebulization every 4 hours as needed for Wheezing  Qty: 540 mL, Refills: 2    Associated Diagnoses: COPD, severe (HCC)      nitroGLYCERIN (NITROSTAT) 0.4 MG SL tablet Place 1 tablet under the tongue every 5 minutes as needed for Chest pain  Qty: 25 tablet, Refills: 1      Multiple Vitamins-Minerals (MULTI FOR HER 50+ PO) Take 1 tablet by mouth daily              Follow-up with CHRISTINE Sapp in 1 month. Total time for discharge: 35 minutes.     CHRISTINE Sapp  The [address], [address] [facility]  Phone: [phone]  Fax: [phone]

## 2022-05-26 NOTE — PROGRESS NOTES
Cardiac Electrophysiology Progress Note     Admit Date: 2022     Reason for follow up: atrial fibrillation/flutter    HPI and Interval History:   Odette Funes is a 69y.o. year old female with past medical history significant for persistent atrial fibrillation s/p ablations/cardioversions, atrial flutter s/p ablations/cardioversions, AAA, CAD, HTN, COPD, PE, DVT and diastolic HF who was admitted to the hospital for dofetilide initiation. Started on dofetilide 250mcg BID . QTc has been appropriate. Converted to SR yesterday evening. She is feeling better, able to walk around the unit without SOB. Feeling better than she has in a while. Physical Examination:  Vitals:    22 1000   BP: (!) 121/57   Pulse: 53   Resp:    Temp:    SpO2:         Intake/Output Summary (Last 24 hours) at 2022 1100  Last data filed at 2022 1000  Gross per 24 hour   Intake 590 ml   Output --   Net 590 ml     In: 990 [P.O.:990]  Out: -    Wt Readings from Last 3 Encounters:   22 216 lb 7.9 oz (98.2 kg)   22 225 lb (102.1 kg)   22 223 lb (101.2 kg)     Temp  Av.9 °F (36.6 °C)  Min: 97.5 °F (36.4 °C)  Max: 98.4 °F (36.9 °C)  Pulse  Av.6  Min: 53  Max: 104  BP  Min: 46/38  Max: 160/92  SpO2  Av.9 %  Min: 80 %  Max: 100 %    · Telemetry: SB in the 50s. · Constitutional: Alert, in no acute distress. Appears stated age. · Head: Normocephalic and atraumatic. · Eyes: Conjunctivae normal. EOM are normal.   · Neck: Neck supple. No lymphadenopathy. No rigidity. No JVD present. · Cardiovascular: Normal rate, RRR. No murmurs, rubs or gallops. No S3 or S4.  · Pulmonary/Chest: Clear breath sounds bilaterally. No crackles, wheezes or rhonchi. No respiratory accessory muscle use. · Abdominal: Soft. Normal bowel sounds present. No distension, No tenderness. · Musculoskeletal: No tenderness. No edema    · Lymphadenopathy: Has no cervical adenopathy. · Neurological: Alert and oriented. No gross deficits. · Skin: Skin is warm and dry. No rash, lesions, ulcerations noted. · Psychiatric: No anxiety or agitation. Labs, diagnostic and imaging results reviewed. Reviewed. Recent Labs     22  0909 22  0431 22  0411    138 137   K 4.0 4.4 3.9    99 99   CO2 27 24 25   BUN 28* 26* 29*   CREATININE 1.5* 1.5* 1.6*     Recent Labs     22  0909   WBC 9.5   HGB 12.3   HCT 37.4   MCV 89.7        Lab Results   Component Value Date    TROPONINI <0.01 2021     Estimated Creatinine Clearance: 34 mL/min (A) (based on SCr of 1.6 mg/dL (H)). No results found for: BNP  Lab Results   Component Value Date    PROTIME 26.0 02/15/2020    PROTIME 11.3 2018    PROTIME 11.7 2018    INR 2.22 02/15/2020    INR 1.00 2018    INR 1.04 2018     Lab Results   Component Value Date    CHOL 107 09/10/2021    HDL 56 09/10/2021    HDL 38 2011    TRIG 59 09/10/2021       Scheduled Meds:   sodium chloride flush  5-40 mL IntraVENous 2 times per day    sodium chloride flush  5-40 mL IntraVENous 2 times per day    mometasone-formoterol  2 puff Inhalation BID    furosemide  40 mg Oral Daily    lisinopril  20 mg Oral Daily    multivitamin  1 tablet Oral Daily    NIFEdipine  60 mg Oral Daily    rosuvastatin  20 mg Oral Daily    dofetilide  250 mcg Oral BID    metoprolol tartrate  12.5 mg Oral BID    rivaroxaban  20 mg Oral Daily with breakfast     Continuous Infusions:   sodium chloride      sodium chloride       PRN Meds:sodium chloride flush, sodium chloride, sodium chloride flush, sodium chloride, potassium chloride **OR** potassium alternative oral replacement **OR** potassium chloride, magnesium sulfate, acetaminophen **OR** acetaminophen, polyethylene glycol, albuterol     EC22  Atrial flutter at 77 BPM. PVC. LAD.      Echo: 9/10/21   Mod conc. LVH with normal LV size & wall motion. EF is    60%.  Diastolic   filling parameters suggest grade II diastolic dysfunction.   The left atrium is severely dilated.   Normal right ventricular size and function.   Trivial mitral, aortic and tricuspid regurgitation.     LHC: 3/5/18  OVERALL IMPRESSION  1.  Again, 100% proximal right coronary artery occlusion with left to right  collaterals. 2.  Mild disease of the distal left main trunk. 3.  20% to 30% ostial left anterior descending artery stenosis with  collaterals from LAD to the right coronary artery. 4.  30% to 40% stenosis of the proximal circumflex artery. 5.  Normal left ventricular systolic function with estimated EF of 55% to  60%. 6.  Slightly enlarged aortic root with no evidence of aortic stenosis or  Regurgitation.     RHC: 2/28/20  OVERALL IMPRESSION:  1.  _____ normal right cardiac pressure. 2.  Elevated pulmonary capillary wedge pressure with a mean pulmonary  capillary wedge of 29 mmHg. 3.  Normal cardiac output and indices. 4.  No oxygen step off to suggest ASD/PFO.     Stress: 2/19/18  Conclusions          Summary     Abnormal study. There is a moderate sized, mild intensity, partially     reversible defect of the mid to apical anterior and mid anterolateral walls     which is consistent with ischemia. There is breast attenuation but stress     images appear worse.     Normal LV size and systolic function.     Overall findings represent an intermediate risk study.            Procedures:  1. PVI, roof line (for L flutter), anterior line ablation (for L flutter) 10/7/20, Dr. Leeann Baird  2. Successful DCCV for persistent A fib, 1/12/21, Dr. Leeann Baird  3. PVI for L flutter, roof line (for L flutter), anterior wall (L flutter), appendage ablation (for L flutter) and low anterior wall ablation (for L flutter), 2/17/2, Dr. Michele Guzman  4. Atrial fibrillation (PVI, CAFE), left atrial flutter (roof line) and left atrial tachycardia ablation on 2/4/22, Dr. Leeann Baird  5.  Successful DCCV for atrial fibrillation, 4/15/22, Dr. Génesis Correa and Plan: Persistent atrial fibrillation             - s/p PVI ablation on 10/7/20 with Dr. Villa Hernadez, had recurrent A fib noted in January 2021 and had successful DCCV later that month              - s/p PVI again in February 2021 and February 2022           - back in atrial fibrillation on 3/21/22           - s/p DCCV on 4/15/22, recurrence of A fib on EKG on 5/5/22                - CHADS2-VASc 5 (age, gender, HTN, HF, CAD) on Xarelto 20mg QD - continue and watch CrCl, if it stays <50, will need to decrease to 15mg QD              - baseline QTc 450ms, CrCl 49mL/min - on dofetilide 250mcg BID   - today's EKG with QTc of 498ms - continue current dose   - converted to SR on 5/25     Left atrial flutter           - seen on EP study s/p roof line and anterior line ablations 10/7/20           - repeat ablation detailed above on 2/17/21            - had recurrent atrial flutter when seen in January 2022 s/p repeat ablation 2/4/22           - see above                Chronic diastolic heart failure             - EF 60%             - appears well compensated, SOB likely secondary to A fib/flutter     CAD              - stable              - on statin, beta blocker     AAA              - s/p repair in 2018 by Dr. Marylen Penman           - continue medical therapy      CHRISTINE Mancuso  [facility] [address]  Phone: [phone]  Fax: [phone]    Electronically signed by CHRISTINE Smith - CNP on 5/26/2022 at 11:00 AM

## 2022-05-27 ENCOUNTER — CARE COORDINATION (OUTPATIENT)
Dept: CASE MANAGEMENT | Age: 77
End: 2022-05-27

## 2022-05-27 LAB
EKG ATRIAL RATE: 101 BPM
EKG ATRIAL RATE: 54 BPM
EKG ATRIAL RATE: 61 BPM
EKG ATRIAL RATE: 96 BPM
EKG DIAGNOSIS: NORMAL
EKG P AXIS: 74 DEGREES
EKG P AXIS: 79 DEGREES
EKG P AXIS: 87 DEGREES
EKG P-R INTERVAL: 128 MS
EKG P-R INTERVAL: 208 MS
EKG P-R INTERVAL: 216 MS
EKG Q-T INTERVAL: 402 MS
EKG Q-T INTERVAL: 454 MS
EKG Q-T INTERVAL: 490 MS
EKG Q-T INTERVAL: 526 MS
EKG QRS DURATION: 88 MS
EKG QRS DURATION: 96 MS
EKG QTC CALCULATION (BAZETT): 493 MS
EKG QTC CALCULATION (BAZETT): 498 MS
EKG QTC CALCULATION (BAZETT): 503 MS
EKG QTC CALCULATION (BAZETT): 507 MS
EKG R AXIS: -24 DEGREES
EKG R AXIS: -27 DEGREES
EKG R AXIS: -29 DEGREES
EKG R AXIS: -31 DEGREES
EKG T AXIS: 100 DEGREES
EKG T AXIS: 11 DEGREES
EKG T AXIS: 17 DEGREES
EKG T AXIS: 19 DEGREES
EKG VENTRICULAR RATE: 54 BPM
EKG VENTRICULAR RATE: 61 BPM
EKG VENTRICULAR RATE: 74 BPM
EKG VENTRICULAR RATE: 96 BPM

## 2022-05-27 PROCEDURE — 93010 ELECTROCARDIOGRAM REPORT: CPT | Performed by: INTERNAL MEDICINE

## 2022-05-27 NOTE — CARE COORDINATION
[place] [facility] Transitions Initial Follow Up Call    Call within 2 business days of discharge: Yes    Patient: Courtney Baxter Patient : 1945 MRN: [id number]  Reason for Admission: Atrial fibrillationDischarge Date: 22 RARS: Readmission Risk Score: 13.4 ( )      Last Discharge [facility]       Complaint Diagnosis Description Type Department Provider    22  Persistent atrial fibrillation [facility]) Admission (Discharged) Flor Carrion MD           Spoke with: [facility] attempted outreach 24 hr hospital discharge for care transition follow up. Left HIPPA compliant message and contact information for call back.     Facility: [address] [place] [address] Transitions 24 Hour Call    Do you have all of your prescriptions and are they filled?: Yes  Care Transitions Interventions         Follow Up  Future Appointments   Date Time Provider Kiana Puri   2022  1:00 PM MD Yunier Nguyen [facility]   2022  1:40 PM CHRISTINE Alexis - CNP [facility]   2022  9:30 AM SCHEDULE, VASCULAR LAB [address] [place] VASC/EN [facility]   2022 11:15 AM Kari Sears MD [place] VASC/EN [facility]   2022 12:40 PM [facility]as [facility]       Cheo Jasso LPN

## 2022-05-31 ENCOUNTER — CARE COORDINATION (OUTPATIENT)
Dept: CASE MANAGEMENT | Age: 77
End: 2022-05-31

## 2022-05-31 NOTE — CARE COORDINATION
[place] [facility] Transitions Initial Follow Up Call    Call within 2 business days of discharge: Yes    Patient: Herminio Guevara Patient : 1945   MRN: [id number]  Reason for Admission: a-flutter, a-fib  Discharge Date: 22 RARS: Readmission Risk Score: 13.4 ( )      Last Discharge [facility]       Complaint Diagnosis Description Type Department Provider    22  Persistent atrial fibrillation [facility]) Admission (Discharged) [address] [facility] Brock Deshpande MD           Spoke with: no one    Facility: [facility] Transitions 24 Hour Call    Do you have all of your prescriptions and are they filled?: Yes  Care Transitions Interventions         Follow Up  Future Appointments   Date Time Provider Kiana Puri   2022  1:00 PM Edith Lloyd MD [facility]   2022  1:40 PM CHRISTINE Overton CNP [facility]   2022  9:30 AM SCHEDULE, VASCULAR LAB 1 [facility]   2022 11:15 AM Cindi Fitzgerald MD [facility]   2022 12:40 PM Mandi Villarreal,  [facility]       Second and final outreach call attempt, no answer. CTN left  with contact information and request for return call. CTN will resolve episode and remain available. [employer] message sent r/t outreach attempt. Pt has a HFU with Ra Felix on . No HFU with PCP and none within 14 days. Will route a message to PCP office.      PERFECTO Batista, RN   Care Transition Nurse  Mobile: [phone]

## 2022-06-02 DIAGNOSIS — I48.19 PERSISTENT ATRIAL FIBRILLATION (HCC): ICD-10-CM

## 2022-06-02 LAB
ANION GAP SERPL CALCULATED.3IONS-SCNC: 19 MMOL/L (ref 3–16)
BUN BLDV-MCNC: 30 MG/DL (ref 7–20)
CALCIUM SERPL-MCNC: 9.4 MG/DL (ref 8.3–10.6)
CHLORIDE BLD-SCNC: 101 MMOL/L (ref 99–110)
CO2: 21 MMOL/L (ref 21–32)
CREAT SERPL-MCNC: 1.3 MG/DL (ref 0.6–1.2)
GFR AFRICAN AMERICAN: 48
GFR NON-AFRICAN AMERICAN: 40
GLUCOSE BLD-MCNC: 98 MG/DL (ref 70–99)
MAGNESIUM: 2.4 MG/DL (ref 1.8–2.4)
POTASSIUM SERPL-SCNC: 4.8 MMOL/L (ref 3.5–5.1)
SODIUM BLD-SCNC: 141 MMOL/L (ref 136–145)

## 2022-06-07 ENCOUNTER — TELEPHONE (OUTPATIENT)
Dept: CARDIOLOGY CLINIC | Age: 77
End: 2022-06-07

## 2022-06-07 NOTE — TELEPHONE ENCOUNTER
Spoke with Ancelmo Delong CNP regarding patient inquiring about diet medication to help loose weight. Orders received to instruct patient to contact PCP regarding medication for weight loss but advise that her current medication Dofetilide may interact with any newly prescribed medication and would need to be checked for possible interaction. Also given the fact that a lot of diet medication are stimulants they may also cause elevated HR. Spoke with patient advised of Ancelmo Delong CNP recommendations. Instructed patient to contact our office before starting on any new medications. Verbalized understanding.

## 2022-06-07 NOTE — TELEPHONE ENCOUNTER
Christie Edmonds called in this afternoon wanting to talk to [address] [place] [address], CHANDA. Christie Edmonds said that since she quit smoking she has put on a lot of weight. She is wanting to know if it's okay for her to take a diet pill? She doesn't have one in mind, but didn't know if [address] [place] [address], NP suggested any of them.     You can reach Christie Edmonds at #[phone]

## 2022-06-10 ENCOUNTER — TELEPHONE (OUTPATIENT)
Dept: FAMILY MEDICINE CLINIC | Age: 77
End: 2022-06-10

## 2022-06-10 NOTE — TELEPHONE ENCOUNTER
Not sure what she is asking for  Diet pill?   No to that  She is also due to see as not here for about 9 months

## 2022-06-10 NOTE — TELEPHONE ENCOUNTER
----- Message from Rustyanabelheidy Laguna sent at 6/10/2022 10:16 AM EDT -----  Subject: Message to Provider    QUESTIONS  Information for Provider? pt is calling in wanting to know if a script   could be called in for her for weight loss. [address] [place]. pt would like a call back regarding this.   ---------------------------------------------------------------------------  --------------  CALL BACK INFO  What is the best way for the office to contact you? OK to leave message on   voicemail  Preferred Call Back Phone Number? [phone]  ---------------------------------------------------------------------------  --------------  SCRIPT ANSWERS  Relationship to Patient?  Self

## 2022-06-16 ENCOUNTER — OFFICE VISIT (OUTPATIENT)
Dept: FAMILY MEDICINE CLINIC | Age: 77
End: 2022-06-16
Payer: MEDICARE

## 2022-06-16 VITALS
BODY MASS INDEX: 39.61 KG/M2 | DIASTOLIC BLOOD PRESSURE: 74 MMHG | TEMPERATURE: 98 F | HEART RATE: 88 BPM | WEIGHT: 232 LBS | SYSTOLIC BLOOD PRESSURE: 138 MMHG | HEIGHT: 64 IN

## 2022-06-16 DIAGNOSIS — E78.2 MIXED HYPERLIPIDEMIA: Primary | Chronic | ICD-10-CM

## 2022-06-16 PROCEDURE — 1090F PRES/ABSN URINE INCON ASSESS: CPT | Performed by: FAMILY MEDICINE

## 2022-06-16 PROCEDURE — 1036F TOBACCO NON-USER: CPT | Performed by: FAMILY MEDICINE

## 2022-06-16 PROCEDURE — 1111F DSCHRG MED/CURRENT MED MERGE: CPT | Performed by: FAMILY MEDICINE

## 2022-06-16 PROCEDURE — G8417 CALC BMI ABV UP PARAM F/U: HCPCS | Performed by: FAMILY MEDICINE

## 2022-06-16 PROCEDURE — 99213 OFFICE O/P EST LOW 20 MIN: CPT | Performed by: FAMILY MEDICINE

## 2022-06-16 PROCEDURE — G8399 PT W/DXA RESULTS DOCUMENT: HCPCS | Performed by: FAMILY MEDICINE

## 2022-06-16 PROCEDURE — 1123F ACP DISCUSS/DSCN MKR DOCD: CPT | Performed by: FAMILY MEDICINE

## 2022-06-16 PROCEDURE — G8427 DOCREV CUR MEDS BY ELIG CLIN: HCPCS | Performed by: FAMILY MEDICINE

## 2022-06-16 SDOH — ECONOMIC STABILITY: TRANSPORTATION INSECURITY
IN THE PAST 12 MONTHS, HAS THE LACK OF TRANSPORTATION KEPT YOU FROM MEDICAL APPOINTMENTS OR FROM GETTING MEDICATIONS?: NO

## 2022-06-16 SDOH — ECONOMIC STABILITY: FOOD INSECURITY: WITHIN THE PAST 12 MONTHS, THE FOOD YOU BOUGHT JUST DIDN'T LAST AND YOU DIDN'T HAVE MONEY TO GET MORE.: NEVER TRUE

## 2022-06-16 SDOH — ECONOMIC STABILITY: FOOD INSECURITY: WITHIN THE PAST 12 MONTHS, YOU WORRIED THAT YOUR FOOD WOULD RUN OUT BEFORE YOU GOT MONEY TO BUY MORE.: NEVER TRUE

## 2022-06-16 SDOH — ECONOMIC STABILITY: TRANSPORTATION INSECURITY
IN THE PAST 12 MONTHS, HAS LACK OF TRANSPORTATION KEPT YOU FROM MEETINGS, WORK, OR FROM GETTING THINGS NEEDED FOR DAILY LIVING?: NO

## 2022-06-16 ASSESSMENT — PATIENT HEALTH QUESTIONNAIRE - PHQ9
SUM OF ALL RESPONSES TO PHQ QUESTIONS 1-9: 0
SUM OF ALL RESPONSES TO PHQ QUESTIONS 1-9: 0
2. FEELING DOWN, DEPRESSED OR HOPELESS: 0
1. LITTLE INTEREST OR PLEASURE IN DOING THINGS: 0
SUM OF ALL RESPONSES TO PHQ QUESTIONS 1-9: 0
SUM OF ALL RESPONSES TO PHQ QUESTIONS 1-9: 0
SUM OF ALL RESPONSES TO PHQ9 QUESTIONS 1 & 2: 0

## 2022-06-16 ASSESSMENT — ENCOUNTER SYMPTOMS
ABDOMINAL DISTENTION: 0
CONSTIPATION: 0
NAUSEA: 0
DIARRHEA: 0
ABDOMINAL PAIN: 0
BLOOD IN STOOL: 0
VOMITING: 0
CHEST TIGHTNESS: 0

## 2022-06-16 ASSESSMENT — SOCIAL DETERMINANTS OF HEALTH (SDOH): HOW HARD IS IT FOR YOU TO PAY FOR THE VERY BASICS LIKE FOOD, HOUSING, MEDICAL CARE, AND HEATING?: NOT HARD AT ALL

## 2022-06-16 NOTE — PROGRESS NOTES
Subjective:      Patient ID: Lorri Gonzalez is a 69 y.o. female.     Chief Complaint   Patient presents with    Check-Up     lipids, hypertension        Patient presents with:  Check-Up: lipids, hypertension    She is here for the above  She is overall well  The pb has been the breathing concerns    YOB: 1945    Date of Visit:  6/16/2022     -- Morphine -- Rash    --  rash    Current Outpatient Medications:  dofetilide (TIKOSYN) 250 mcg capsule, Take 1 capsule by mouth every 12 hours, Disp: 60 capsule, Rfl: 5  lisinopril (PRINIVIL;ZESTRIL) 20 MG tablet, Take 1 tablet by mouth daily, Disp: 30 tablet, Rfl: 3  albuterol sulfate  (90 Base) MCG/ACT inhaler, INHALE TWO PUFFS BY MOUTH EVERY 4 HOURS AS NEEDED FOR WHEEZING OR FOR SHORTNESS OF BREATH, Disp: 1 each, Rfl: 3  metoprolol succinate (TOPROL XL) 25 MG extended release tablet, Take 1 tablet by mouth daily, Disp: 30 tablet, Rfl: 3  furosemide (LASIX) 40 MG tablet, Take 1 tablet by mouth daily, Disp: 90 tablet, Rfl: 1  rosuvastatin (CRESTOR) 20 MG tablet, Take 1 tablet by mouth daily, Disp: 90 tablet, Rfl: 2  budesonide-formoterol (SYMBICORT) 160-4.5 MCG/ACT AERO, Inhale 2 puffs into the lungs 2 times daily, Disp: 1 each, Rfl: 5  rivaroxaban (XARELTO) 20 MG TABS tablet, TAKE ONE TABLET BY MOUTH DAILY WITH BREAKFAST, Disp: 30 tablet, Rfl: 11  clobetasol (TEMOVATE) 0.05 % cream, Apply topically 2 times daily as needed for Rash Apply to rash between genital area and buttocks and around anus bid prn x up to 2 weeks -need at least 1 week break prior to restarting, Disp: , Rfl:   mupirocin (BACTROBAN) 2 % ointment, Apply topically 3 times daily Apply to scabs three times daily for 10 days or until healed, Disp: , Rfl:   triamcinolone (KENALOG) 0.1 % ointment, Apply topically 2 times daily as needed for Rash Apply to rash between genital area and buttocks and around anus bid prn during breaks from clobetasol, Disp: , Rfl:   albuterol (PROVENTIL) (2.5 MG/3ML) 0.083% nebulizer solution, Take 3 mLs by nebulization every 4 hours as needed for Wheezing, Disp: 540 mL, Rfl: 2  nitroGLYCERIN (NITROSTAT) 0.4 MG SL tablet, Place 1 tablet under the tongue every 5 minutes as needed for Chest pain, Disp: 25 tablet, Rfl: 1  Multiple Vitamins-Minerals (MULTI FOR HER 50+ PO), Take 1 tablet by mouth daily, Disp: , Rfl:     No current facility-administered medications for this visit.      ---------------------------               06/16/22                      1320         ---------------------------   BP:          138/74         Site:    Left Upper Arm     Position:     Sitting        Cuff Size:   Large Adult      Pulse:         88           Temp:    98 °F (36.7 °C)    TempSrc:    Temporal        Weight: 232 lb (105.2 kg)   Height:  5' 4\" (1.626 m)   ---------------------------  Body mass index is 39.82 kg/m². Wt Readings from Last 3 Encounters:  06/16/22 : 232 lb (105.2 kg)  05/26/22 : 216 lb 7.9 oz (98.2 kg)  05/13/22 : 225 lb (102.1 kg)    BP Readings from Last 3 Encounters:  06/16/22 : 138/74  05/26/22 : (!) 121/59  05/13/22 : 120/70        Review of Systems   Constitutional: Negative for appetite change, chills, fever and unexpected weight change. Respiratory: Negative for chest tightness. She will get hua      Cardiovascular: Negative for chest pain, palpitations and leg swelling. Gastrointestinal: Negative for abdominal distention, abdominal pain, blood in stool, constipation, diarrhea, nausea and vomiting. Genitourinary: Negative for difficulty urinating, dysuria and hematuria. Neurological: Negative for dizziness and headaches. Objective:   Physical Exam  Constitutional:       General: She is not in acute distress. Appearance: Normal appearance. She is well-developed. She is not ill-appearing or diaphoretic. Cardiovascular:      Rate and Rhythm: Normal rate and regular rhythm.       Heart sounds: Normal heart sounds. No murmur heard. No friction rub. No gallop. Pulmonary:      Effort: Pulmonary effort is normal. No tachypnea, accessory muscle usage or respiratory distress. Breath sounds: Normal breath sounds. No decreased breath sounds, wheezing, rhonchi or rales. Comments: Few ronchi at the bases   Chest:   Breasts:      Right: No supraclavicular adenopathy. Left: No supraclavicular adenopathy. Lymphadenopathy:      Cervical: No cervical adenopathy. Upper Body:      Right upper body: No supraclavicular adenopathy. Left upper body: No supraclavicular adenopathy. Skin:     General: Skin is warm and dry. Coloration: Skin is not pale. Neurological:      Mental Status: She is alert. Assessment:        Diagnosis Orders   1. Mixed hyperlipidemia         She tells me she is still seeing pulmonary  She is also seeing cardiology   She is using O2 at night.  She is using a friends O2 at 2 l   She tells me she is sleeping with head of bed elevated  She tells me hx of mane but not able to tolerate mask     She refused to have mammogram  Last cardiology visit on 5/13/22 for the afib  Bmp noted from 6/2/22      Plan:      continue to use the medicine  Try and stay with the diet  See us back in November         Terry Oglesby MD

## 2022-06-17 ENCOUNTER — NURSE ONLY (OUTPATIENT)
Dept: CARDIOLOGY CLINIC | Age: 77
End: 2022-06-17
Payer: MEDICARE

## 2022-06-17 VITALS — DIASTOLIC BLOOD PRESSURE: 72 MMHG | SYSTOLIC BLOOD PRESSURE: 164 MMHG | OXYGEN SATURATION: 90 % | HEART RATE: 72 BPM

## 2022-06-17 DIAGNOSIS — I48.19 PERSISTENT ATRIAL FIBRILLATION (HCC): Primary | ICD-10-CM

## 2022-06-17 PROCEDURE — 93000 ELECTROCARDIOGRAM COMPLETE: CPT | Performed by: NURSE PRACTITIONER

## 2022-06-17 NOTE — PROGRESS NOTES
Pt. Came into office today for a EKG to see if she is in afib. Sumeet Douglas NP reviewed ekg. Ekg showed sinus rhythm. Pt. Denies chest pain and pressure. Patient states she has been getting sob. Pt mentioned that she has been gaining weight and thinks that may play a part in her sob. Pt had appointment yesterday with her pcp and has upcoming appointment with Dr. Shara Rice on 06/24/2022. Advised pt to give us a call if she notices any chest pain or pressure or if he symptoms do not improve. She v/u.

## 2022-06-23 NOTE — PROGRESS NOTES
[facility]  Office Visit Progress Note    Mali Hart  [birth year]    June 24, 2022    CC: \"My shortness of breath is getting worse. \"     HPI:  The patient is 69 y.o. female with a past medical history significant for CAD, atrial fib, HTN, aortic aneurysm and COPD who is here for follow up. Admitted 1/7/2018-1/12/2018  With SOB and chest pain and dx with acute on chronic diastolic HF (diuresed), and atrial fib. Troponins elevated, testing pended due to sepsis from the flu. Outpatient stress testing positive and she underwent cardiac cath on 3/2/18 which revealed  of the RCA and nonobstructive CAD of the LAD and LCX. She also underwent endovascular AAA repair on 3/21/18 by Dr. Jean Paul Kirkland. Patient was  in the hospital and had 2/25/20 for atrial fibrillation along with signs and symptoms of heart failure. She converted to sinus rhythm after she was started on amiodarone therapy. Continued management per Dr. Giovanna Hodge. S/p PVI ablation 10/2020, she then had Afib ablation last year but had recurrence of A fib She underwent DCCV 1/12/21,  repeat AFIB ablation 2/17/21. continue Xarelto,  Toprol-XL. Flecainide discontinued 5/18/21 with plans for 30 day Event monitor at his f/u with Denise Boyce CNP. Admitted 9-9-21 to 9-12-21 acute on chronic diastolic heart failure, pleural effusion and AFIB. DC weight 221#. She continues with off/on SOB per patient's report. Admitted 5/24-5/26/22 atrial fibrillation for dofetilide loading after having multiple ablations, cardioversions (last ablation 2/4/22 ,CV 4/2022) and failing flecainide therapy. She was started on 250mcg of dofetilide Q12H. Her QTc was monitored closely without any significant changes or need for dose adjustment. Electrolytes also monitored daily and replaced if needed. She did convert to sinus rhythm on 5/25/22 with improvement in symptoms. QTc remains acceptable today.  She will be discharged on 250mcg of dofetilide Q12H with a BMP in 1 week. Today, she reports continued SOB at rest, exertion and laying down. She has not contacted Dr. Mili Carter with an update with her COPD. Using nocturnal oxygen therapy only. Patient denies exertional chest pain/pressure, orthopnea, palpitations, lightheadedness, weight changes, changes in LE edema, and syncope. She admits to medical therapy compliance and feels she is tolerating. Review of Systems:  Constitutional: Denies falls, night sweats or fever. Fatigue improved. HEENT: Denies new visual changes, ringing in ears, nosebleeds, nasal congestion  Respiratory: + SOBE , rest, laying down. Cardiovascular: see HPI  GI: Denies N/V, diarrhea, constipation, abdominal pain, change in bowel habits, melena or hematochezia  : Denies urinary frequency, urgency, incontinence, hematuria or dysuria. Skin: Denies rash, hives, or cyanosis  Musculoskeletal: Denies joint or muscle aches/pain  Neurological: Denies syncope or TIA-like symptoms. Psychiatric: Denies anxiety or depression, negative insomnia     Past Medical History:   Diagnosis Date    AAA (abdominal aortic aneurysm) ([address]     pt states it is 4cm    AAA (abdominal aortic aneurysm) without rupture ([address] 2/10/2015    Atrial fibrillation (HCC)     CAD (coronary artery disease)     CHF (congestive heart failure) ([address]     COPD (chronic obstructive pulmonary disease) (HCC)     History of blood clots     Hyperlipidemia     Hypertension      Past Surgical History:   Procedure Laterality Date    ABDOMINAL AORTIC ANEURYSM REPAIR      Endovascular abdominal AA    APPENDECTOMY  1990    incidental    BLADDER SUSPENSION      CATARACT REMOVAL      CHOLECYSTECTOMY  10/15/13    COLONOSCOPY  9/2/07    dr Sandra Suazo and check in 5 years.  COLONOSCOPY  06/16/2017    ok dr arndt, repeat 5 years    HYSTERECTOMY ([address] [place] [address])  1990    for benign tumor.  just the uterus    JOINT REPLACEMENT  12/2013    right knee replacement    TONSILLECTOMY  as a child    TUMOR EXCISION      benign behind right ear about      Family History   Problem Relation Age of Onset    Heart Disease Father     Hypertension Other      Social History     Tobacco Use    Smoking status: Former Smoker     Packs/day: 1.00     Years: 37.00     Pack years: 37.00     Types: Cigarettes     Start date: 1976     Quit date: 2013     Years since quittin.7    Smokeless tobacco: Never Used    Tobacco comment: E cigarette now and then   Vaping Use    Vaping Use: Some days    Substances: Nicotine   Substance Use Topics    Alcohol use: No    Drug use: No       Allergies   Allergen Reactions    Morphine Rash     rash     Current Outpatient Medications   Medication Sig Dispense Refill    dofetilide (TIKOSYN) 250 mcg capsule Take 1 capsule by mouth every 12 hours 60 capsule 5    lisinopril (PRINIVIL;ZESTRIL) 20 MG tablet Take 1 tablet by mouth daily 30 tablet 3    albuterol sulfate  (90 Base) MCG/ACT inhaler INHALE TWO PUFFS BY MOUTH EVERY 4 HOURS AS NEEDED FOR WHEEZING OR FOR SHORTNESS OF BREATH 1 each 3    metoprolol succinate (TOPROL XL) 25 MG extended release tablet Take 1 tablet by mouth daily 30 tablet 3    furosemide (LASIX) 40 MG tablet Take 1 tablet by mouth daily 90 tablet 1    rosuvastatin (CRESTOR) 20 MG tablet Take 1 tablet by mouth daily 90 tablet 2    budesonide-formoterol (SYMBICORT) 160-4.5 MCG/ACT AERO Inhale 2 puffs into the lungs 2 times daily 1 each 5    rivaroxaban (XARELTO) 20 MG TABS tablet TAKE ONE TABLET BY MOUTH DAILY WITH BREAKFAST 30 tablet 11    clobetasol (TEMOVATE) 0.05 % cream Apply topically 2 times daily as needed for Rash Apply to rash between genital area and buttocks and around anus bid prn x up to 2 weeks -need at least 1 week break prior to restarting      mupirocin (BACTROBAN) 2 % ointment Apply topically 3 times daily Apply to scabs three times daily for 10 days or until healed      triamcinolone (KENALOG) 0.1 % ointment Apply topically 2 times daily as needed for Rash Apply to rash between genital area and buttocks and around anus bid prn during breaks from clobetasol      albuterol (PROVENTIL) (2.5 MG/3ML) 0.083% nebulizer solution Take 3 mLs by nebulization every 4 hours as needed for Wheezing 540 mL 2    nitroGLYCERIN (NITROSTAT) 0.4 MG SL tablet Place 1 tablet under the tongue every 5 minutes as needed for Chest pain 25 tablet 1    Multiple Vitamins-Minerals (MULTI FOR HER 50+ PO) Take 1 tablet by mouth daily       No current facility-administered medications for this visit. Physical Exam:   /80   Pulse 80   Ht 5' 4\" (1.626 m)   Wt 221 lb (100.2 kg)   SpO2 96%   BMI 37.93 kg/m²   Wt Readings from Last 2 Encounters:   06/24/22 221 lb (100.2 kg)   06/16/22 232 lb (105.2 kg)     Physical Exam:   Constitutional: The patient is oriented to person, place, and time. Appears well-developed and well-nourished. In no acute distress. Head: Normocephalic and atraumatic. Pupils equal and round. Neck: Neck supple. No JVP or carotid bruit appreciated. No mass and no thyromegaly present. No lymphadenopathy present. Cardiovascular: Normal rate and regular rhythm. Normal heart sounds. Exam reveals no gallop and no friction rub. No murmur heard. Pulmonary/Chest: Effort normal and breath sounds normal. No respiratory distress. + wheezes, no rhonchi or rales. Abdominal: Soft, non-tender. Bowel sounds are normal. Exhibits no organomegaly, mass or bruit. Extremities: BLE edema, L>R. No cyanosis or clubbing. Pulses are 2+ radial and carotid bilaterally. Neurological: No gross cranial nerve deficit. Coordination normal.   Skin: Skin is warm and dry. There is no rash or diaphoresis. Psychiatric: Patient has a normal mood and affect.  Speech is normal and behavior is normal.     Lab Review:   Lab Results   Component Value Date    TRIG 59 09/10/2021    HDL 56 09/10/2021    HDL 38 09/09/2011 LDLCALC 39 09/10/2021    LDLDIRECT 111 2012    LABVLDL 12 09/10/2021     Lab Results   Component Value Date     2022    K 4.8 2022    K 4.2 2021     2022    CO2 21 2022    BUN 30 2022    CREATININE 1.3 2022    GLUCOSE 98 2022    GLUCOSE 102 2016    CALCIUM 9.4 2022      Lab Results   Component Value Date    WBC 9.5 2022    HGB 12.3 2022    HCT 37.4 2022    MCV 89.7 2022     2022       EK2018: Sinus rhythm with old anterior infarct  18: Sinus Rhythm, PACs, Old anterior infarct. 19: Sinus Rhythm  20: Sinus  Rhythm  - occasional PAC, # PACs = 1, -Anteroseptal infarct -age undetermined.    -Nonspecific ST depression  -Nondiagnostic. 21: Sinus rhythm  -First degree A-V block , -Anteroseptal infarct -age undetermined. 22: SR     Echo 2018:  Mild concentric left ventricular hypertrophy. Visually estimated ejection fraction of 65%. Mitral annular calcification. Mild left atrial dilation. Mild aortic stenosis. (mean gradients [id number])  The systolic pulmonary artery pressure (SPAP) estimated at 30 mmHg  (estimated RA pressure of 8 mmHg included). [facility]: ([facility]) 2017   of RCA chronic LAD 10-20% RA 4 PA24/ 16 wedge 9 LVEDP markedly elevated 30    Carotid US 10/2017 at [facility]:  1. Estimated diameter reduction of the right internal carotid artery is  less than 50%. 2.  Estimated diameter reduction of the left internal carotid artery is  50-69%. 3.  The bilateral common carotid arteries reveal no evidence of   significant stenosis. 4.  There is greater than 50% stenosis of the right external carotid  artery. 5.  There is no evidence of significant stenosis in the left external  carotid artery. 6.  The bilateral vertebral arteries are patent with antegrade flow. 7.  The bilateral subclavian arteries reveal no evidence of significant  stenosis.   8.  There has been no significant change from the previous study of  4/21/2017.     Lexiscan 2/19/18   Summary    Abnormal study. There is a moderate sized, mild intensity, partially    reversible defect of the mid to apical anterior and mid anterolateral walls    which is consistent with ischemia. There is breast attenuation but stress    images appear worse.    Normal LV size and systolic function.    Overall findings represent an intermediate risk study     Cardiac Cath 3/5/18  OVERALL IMPRESSION  1. Again, 100% proximal right coronary artery occlusion with left to right  collaterals. 2.  Mild disease of the distal left main trunk. 3.  20% to 30% ostial left anterior descending artery stenosis with  collaterals from LAD to the right coronary artery. 4.  30% to 40% stenosis of the proximal circumflex artery. 5.  Normal left ventricular systolic function with estimated EF of 55% to  60%. 6.  Slightly enlarged aortic root with no evidence of aortic stenosis or  regurgitation.     In view of the above findings, we will okay the patient for her upcoming  aortic aneurysm surgery from cardiac standpoint. ECHO 1/2/20  There is moderate concentric left ventricular hypertrophy. Patient appears to be in atrial fibrillation. Ejection fraction is estimated to be 50-60%. Indeterminate diastolic function. Mild aortic regurgitation. Mild tricuspid regurgitation. Mild mitral regurgitation with mitral annular calcification    Right Heart Cath 2/28/2020  1.  _____ normal right cardiac pressure. 2.  Elevated pulmonary capillary wedge pressure with a mean pulmonary  capillary wedge of 29 mmHg. 3.  Normal cardiac output and indices. 4.  No oxygen step off to suggest ASD/PFO.   In view of the above findings, we will start the patient on a small dose  of diuretic therapy and see whether we need to adjust some of her  antihypertensive medicines or not. Her findings are consistent with a  diastolic heart failure.     Assessment/Plan: Chronic diastolic heart failure  -worsening LI, at rest and laying down. -BP stable today. -oxygen therapy nocturnal only for desaturation.   -wheezing and BLE edema on physical exam  -EKG today SR  -lasix 40 mg daily   -Lisinopril to 10 mg BID, Toprol-XL. -No aldactone secondary to elevated Cr.   -BMP remains abnormal but improved 6/2022   -Will complete CXR, AB-G's, BNP, CBC, CMP NOW. -Encouraged medication compliance, low salt diet with hx of indiscretions, compression stockings and elevating legs. NYHA  Class 2/3      Severe COPD/Asthma managed per Dr. Mendosa . + wheezes scattered and BLE edema on physical exam. Has not placed a call into Dr. Sandie England to discuss medical therapy. See above testing. Coronary artery disease involving native coronary artery of native heart without angina pectoris  -She denies any signs or symptoms of angina today but continues with worsening LI, chronic but now with rest, exertion and laying down. See above testing.   -Continue medical management with BB, statin, Imdur, hydralainze and PRN SL Nitro.   -She previously reported myalgias with Crestor with resolution of cramping.   -Lipid profile 9/10/21 wnl. Essential hypertension  -Blood pressure is stable today on medical therapy. Persistent atrial fibrillation   -CHADS VASC score 7 for age, gender, DVT, CHF, HTN, CAD.   -Managed per Dr. Lovette Blizzard. S/p PVI ablation 10/2020, she then had Afib ablation last year but had recurrence of A fib She underwent DCCV 1/12/21,  repeat AFIB ablation 2/17/21, DCCV 4/2022.   -5/24/22 admitted for dofetilide loading after having multiple ablations, cardioversions and failing flecainide therapy. She was started on 250mcg of dofetilide Q12H. Her QTc was monitored closely without any significant changes or need for dose adjustment. Electrolytes also monitored daily and replaced if needed. She did convert to sinus rhythm on 5/25/22 with improvement in symptoms.  QTc remains acceptable today. She will be discharged on 250mcg of dofetilide Q12H with a BMP in 1 week.   -presents today with worsening SOB  -EKG today SR     AAA (abdominal aortic aneurysm) without rupture   -Underwent endovascular AAA repair on 3/21/18 by Dr. Arun Odell. Follows vascular surgery    Sleep apnea  -Intolerant to CPAP. We will call with test results and discuss further recommendations and plan moving forward. Thank you very much for allowing me to participate in the care of your patient. Please do not hesitate to contact me if you have any questions.     Sincerely,  Osbaldo Galicia MD      [facility], [facility] [address]  Ph: [phone]  Fax: [phone]

## 2022-06-24 ENCOUNTER — HOSPITAL ENCOUNTER (OUTPATIENT)
Dept: GENERAL RADIOLOGY | Age: 77
Discharge: HOME | End: 2022-06-24
Payer: MEDICARE

## 2022-06-24 ENCOUNTER — OFFICE VISIT (OUTPATIENT)
Dept: CARDIOLOGY CLINIC | Age: 77
End: 2022-06-24
Payer: MEDICARE

## 2022-06-24 ENCOUNTER — HOSPITAL ENCOUNTER (OUTPATIENT)
Age: 77
Discharge: HOME | End: 2022-06-24
Payer: MEDICARE

## 2022-06-24 VITALS
WEIGHT: 221 LBS | DIASTOLIC BLOOD PRESSURE: 80 MMHG | OXYGEN SATURATION: 96 % | SYSTOLIC BLOOD PRESSURE: 138 MMHG | BODY MASS INDEX: 37.73 KG/M2 | HEART RATE: 80 BPM | HEIGHT: 64 IN

## 2022-06-24 DIAGNOSIS — I48.19 PERSISTENT ATRIAL FIBRILLATION (HCC): ICD-10-CM

## 2022-06-24 DIAGNOSIS — R06.2 WHEEZING: ICD-10-CM

## 2022-06-24 DIAGNOSIS — J44.9 COPD, SEVERE (HCC): ICD-10-CM

## 2022-06-24 DIAGNOSIS — I50.33 ACUTE ON CHRONIC DIASTOLIC CONGESTIVE HEART FAILURE (HCC): Primary | ICD-10-CM

## 2022-06-24 DIAGNOSIS — R06.00 PND (PAROXYSMAL NOCTURNAL DYSPNEA): ICD-10-CM

## 2022-06-24 DIAGNOSIS — I50.33 ACUTE ON CHRONIC DIASTOLIC (CONGESTIVE) HEART FAILURE (HCC): Primary | ICD-10-CM

## 2022-06-24 DIAGNOSIS — R06.09 DYSPNEA ON EXERTION: ICD-10-CM

## 2022-06-24 DIAGNOSIS — M79.89 LEG SWELLING: ICD-10-CM

## 2022-06-24 DIAGNOSIS — I25.10 CORONARY ARTERY DISEASE INVOLVING NATIVE CORONARY ARTERY OF NATIVE HEART WITHOUT ANGINA PECTORIS: ICD-10-CM

## 2022-06-24 DIAGNOSIS — I50.33 ACUTE ON CHRONIC DIASTOLIC CONGESTIVE HEART FAILURE (HCC): ICD-10-CM

## 2022-06-24 DIAGNOSIS — I50.33 ACUTE ON CHRONIC DIASTOLIC (CONGESTIVE) HEART FAILURE (HCC): ICD-10-CM

## 2022-06-24 DIAGNOSIS — I10 ESSENTIAL HYPERTENSION: ICD-10-CM

## 2022-06-24 DIAGNOSIS — R79.89 ELEVATED BRAIN NATRIURETIC PEPTIDE (BNP) LEVEL: Primary | ICD-10-CM

## 2022-06-24 LAB
A/G RATIO: 1.5 (ref 1.1–2.2)
ALBUMIN SERPL-MCNC: 4.3 G/DL (ref 3.4–5)
ALP BLD-CCNC: 79 U/L (ref 40–129)
ALT SERPL-CCNC: 12 U/L (ref 10–40)
ANION GAP SERPL CALCULATED.3IONS-SCNC: 13 MMOL/L (ref 3–16)
AST SERPL-CCNC: 21 U/L (ref 15–37)
BASE EXCESS ARTERIAL: 6.9 MMOL/L (ref -3–3)
BILIRUB SERPL-MCNC: 0.4 MG/DL (ref 0–1)
BUN BLDV-MCNC: 22 MG/DL (ref 7–20)
CALCIUM SERPL-MCNC: 9.5 MG/DL (ref 8.3–10.6)
CARBOXYHEMOGLOBIN ARTERIAL: 1.5 % (ref 0–1.5)
CHLORIDE BLD-SCNC: 99 MMOL/L (ref 99–110)
CO2: 28 MMOL/L (ref 21–32)
CREAT SERPL-MCNC: 1.2 MG/DL (ref 0.6–1.2)
GFR AFRICAN AMERICAN: 53
GFR NON-AFRICAN AMERICAN: 44
GLUCOSE BLD-MCNC: 110 MG/DL (ref 70–99)
HCO3 ARTERIAL: 31.6 MMOL/L (ref 21–29)
HCT VFR BLD CALC: 34.6 % (ref 36–48)
HEMOGLOBIN, ART, EXTENDED: 10.8 G/DL (ref 12–16)
HEMOGLOBIN: 11.5 G/DL (ref 12–16)
MCH RBC QN AUTO: 29.4 PG (ref 26–34)
MCHC RBC AUTO-ENTMCNC: 33.3 G/DL (ref 31–36)
MCV RBC AUTO: 88.4 FL (ref 80–100)
METHEMOGLOBIN ARTERIAL: 0.1 %
O2 SAT, ARTERIAL: 96 %
O2 THERAPY: ABNORMAL
PCO2 ARTERIAL: 45.1 MMHG (ref 35–45)
PDW BLD-RTO: 15.7 % (ref 12.4–15.4)
PH ARTERIAL: 7.45 (ref 7.35–7.45)
PLATELET # BLD: 225 K/UL (ref 135–450)
PMV BLD AUTO: 9.2 FL (ref 5–10.5)
PO2 ARTERIAL: 72.2 MMHG (ref 75–108)
POTASSIUM SERPL-SCNC: 4.2 MMOL/L (ref 3.5–5.1)
PRO-BNP: 2209 PG/ML (ref 0–449)
RBC # BLD: 3.92 M/UL (ref 4–5.2)
SODIUM BLD-SCNC: 140 MMOL/L (ref 136–145)
TCO2 ARTERIAL: 33 MMOL/L
TOTAL PROTEIN: 7.1 G/DL (ref 6.4–8.2)
WBC # BLD: 10.5 K/UL (ref 4–11)

## 2022-06-24 PROCEDURE — 93000 ELECTROCARDIOGRAM COMPLETE: CPT | Performed by: INTERNAL MEDICINE

## 2022-06-24 PROCEDURE — 3023F SPIROM DOC REV: CPT | Performed by: INTERNAL MEDICINE

## 2022-06-24 PROCEDURE — G8417 CALC BMI ABV UP PARAM F/U: HCPCS | Performed by: INTERNAL MEDICINE

## 2022-06-24 PROCEDURE — 1123F ACP DISCUSS/DSCN MKR DOCD: CPT | Performed by: INTERNAL MEDICINE

## 2022-06-24 PROCEDURE — 99214 OFFICE O/P EST MOD 30 MIN: CPT | Performed by: INTERNAL MEDICINE

## 2022-06-24 PROCEDURE — G8399 PT W/DXA RESULTS DOCUMENT: HCPCS | Performed by: INTERNAL MEDICINE

## 2022-06-24 PROCEDURE — G8427 DOCREV CUR MEDS BY ELIG CLIN: HCPCS | Performed by: INTERNAL MEDICINE

## 2022-06-24 PROCEDURE — 1090F PRES/ABSN URINE INCON ASSESS: CPT | Performed by: INTERNAL MEDICINE

## 2022-06-24 PROCEDURE — 1036F TOBACCO NON-USER: CPT | Performed by: INTERNAL MEDICINE

## 2022-06-24 PROCEDURE — 71046 X-RAY EXAM CHEST 2 VIEWS: CPT

## 2022-06-24 PROCEDURE — 1111F DSCHRG MED/CURRENT MED MERGE: CPT | Performed by: INTERNAL MEDICINE

## 2022-06-24 RX ORDER — FUROSEMIDE 40 MG/1
40 TABLET ORAL 2 TIMES DAILY
COMMUNITY
End: 2022-06-24 | Stop reason: SDUPTHER

## 2022-06-24 RX ORDER — FUROSEMIDE 40 MG/1
40 TABLET ORAL 2 TIMES DAILY
Qty: 180 TABLET | Refills: 3 | Status: ON HOLD | OUTPATIENT
Start: 2022-06-24

## 2022-06-24 NOTE — PATIENT INSTRUCTIONS
Patient Education        How to Read a Food Label to Limit Sodium: Care Instructions  Overview  Limiting sodium can be an important part of managing some health problems. Processed foods, fast food, and restaurant foods are the major sources of dietary sodium. The most common name for sodium is salt. Most packaged foods have a Nutrition Facts label. This will tell you how much sodium is in oneserving of food. Follow-up care is a key part of your treatment and safety. Be sure to make and go to all appointments, and call your doctor if you are having problems. It's also a good idea to know your test results and keep alist of the medicines you take. How can you care for yourself at home? Read ingredient lists on food labels   Read the list of ingredients on food labels to help you find how much sodium is in a food. The label lists the ingredients in a food in descending order (from the most to the least). If salt or sodium is high on the list, there may be a lot of sodium in the food.  Know that sodium has different names. Sodium is also called monosodium glutamate (MSG), sodium citrate, sodium alginate, and sodium phosphate. Read Nutrition Facts labels   On most foods, there is a Nutrition Facts label. This will tell you how much sodium is in one serving of food. Look at both the serving size and the sodium amount. The serving size is located at the top of the label, usually right under the \"Nutrition Facts\" title. The amount of sodium is given in the list under the title. It is given in milligrams (mg). ? Check the serving size carefully. A single serving is often very small, and you may eat more than one serving. If this is the case, you will eat more sodium than listed on the label. For example, if the serving size for a canned soup is 1 cup and the sodium amount is 470 mg, if you have 2 cups you will eat 940 mg of sodium.    The nutrition facts for fresh fruits and vegetables are not listed on the food. They may be listed somewhere in the store. These foods usually have no sodium or low sodium.  The Nutrition Facts label also gives you the Percent Daily Value for sodium. This is how much of the recommended amount of sodium a serving contains. The daily value for sodium is 2,300 mg. So if the Percent Daily Value says 50%, this means one serving is giving you half of this, or 1,150 mg. Buy low-sodium foods   Look for foods that are made with less sodium. Watch for the following words on the label. ? \"Unsalted\" means there is no sodium added to the food. But there may be sodium already in the food naturally. ? \"Sodium-free\" means a serving has less than 5 mg of sodium. ? \"Very low sodium\" means a serving has 35 mg or less of sodium. ? \"Low-sodium\" means a serving has 140 mg or less of sodium.  \"Reduced-sodium\" means that there is 25% less sodium than what the food normally has. This is still usually too much sodium.  Buy fresh vegetables, or frozen vegetables without added sauces. Buy low-sodium versions of canned vegetables, soups, and other canned goods. Where can you learn more? Go to https://[employer]. org and sign in to your [employer] account. Enter [id number] in the [facility] box to learn more about \"How to Read a Food Label to Limit Sodium: Care Instructions. \"     If you do not have an account, please click on the \"Sign Up Now\" link. Current as of: September 8, 2021               Content Version: 13.3  © 9440-2087 [employer]. Care instructions adapted under license by [facility] ([facility]). If you have questions about a medical condition or this instruction, always ask your healthcare professional. [address] any warranty or liability for your use of this information.

## 2022-06-24 NOTE — TELEPHONE ENCOUNTER
Dr Lor Mcmillan increased furosemide to 40 mg twice a day after reviewing test results. See BNP result message. Patient was in office today.

## 2022-06-27 RX ORDER — DOFETILIDE 0.25 MG/1
250 CAPSULE ORAL EVERY 12 HOURS
Qty: 60 CAPSULE | Refills: 5 | Status: ON HOLD | OUTPATIENT
Start: 2022-06-27

## 2022-06-27 RX ORDER — DOFETILIDE 0.25 MG/1
250 CAPSULE ORAL EVERY 12 HOURS
Qty: 180 CAPSULE | Refills: 3 | Status: CANCELLED | OUTPATIENT
Start: 2022-06-27

## 2022-06-27 NOTE — TELEPHONE ENCOUNTER
Medication Refill    Medication needing refilled:  dofetilide (TIKOSYN)       Dosage of the medication:  250 mcg capsule       How are you taking this medication (QD, BID, TID, QID, PRN):  Take 1 capsule by mouth every 12 hours     30 or 90 day supply called in:  60 capsule    When will you run out of your medication:  She's been without medication for 2 days    Which Pharmacy are we sending the medication to?:    Radha [facility]  [phone]

## 2022-07-05 ENCOUNTER — TELEPHONE (OUTPATIENT)
Dept: PULMONOLOGY | Age: 77
End: 2022-07-05

## 2022-07-05 DIAGNOSIS — R06.00 DYSPNEA, UNSPECIFIED TYPE: Primary | ICD-10-CM

## 2022-07-05 NOTE — TELEPHONE ENCOUNTER
Telephone call from today was entered into the wrong chart. Patient had a recent CXR on 6/24/22 by Dr. Nell Lopez. Please review and advise if another CXR is needed.

## 2022-07-05 NOTE — TELEPHONE ENCOUNTER
Patient calling with SOB, no other symptoms. I advised her to go to the ER but she gave all kinds of excuses. Please advise. Per Dr. [facility] - her  Symptoms are too vague to know what is needed. He wanted to know if she needed more Lasix or prednisone. Patient was then contacted and advised she is no longer in Af[facility] so doesn't think her symptoms are related to heart. She is currently on Lasix daily. She is requesting a CT Chest but advised Dr. [facility] will only order a CXR.

## 2022-07-08 ENCOUNTER — APPOINTMENT (OUTPATIENT)
Dept: GENERAL RADIOLOGY | Age: 77
DRG: 291 | End: 2022-07-08
Payer: MEDICARE

## 2022-07-08 ENCOUNTER — HOSPITAL ENCOUNTER (INPATIENT)
Age: 77
LOS: 4 days | Discharge: HOME | DRG: 291 | End: 2022-07-12
Attending: EMERGENCY MEDICINE | Admitting: HOSPITALIST
Payer: MEDICARE

## 2022-07-08 ENCOUNTER — OFFICE VISIT (OUTPATIENT)
Dept: FAMILY MEDICINE CLINIC | Age: 77
End: 2022-07-08
Payer: MEDICARE

## 2022-07-08 VITALS
OXYGEN SATURATION: 96 % | WEIGHT: 226 LBS | SYSTOLIC BLOOD PRESSURE: 170 MMHG | BODY MASS INDEX: 38.58 KG/M2 | DIASTOLIC BLOOD PRESSURE: 86 MMHG | RESPIRATION RATE: 30 BRPM | HEIGHT: 64 IN | HEART RATE: 84 BPM | TEMPERATURE: 97.3 F

## 2022-07-08 DIAGNOSIS — I50.32 CHRONIC DIASTOLIC HEART FAILURE (HCC): ICD-10-CM

## 2022-07-08 DIAGNOSIS — I50.9 ACUTE ON CHRONIC CONGESTIVE HEART FAILURE, UNSPECIFIED HEART FAILURE TYPE (HCC): ICD-10-CM

## 2022-07-08 DIAGNOSIS — R06.89 DYSPNEA AND RESPIRATORY ABNORMALITIES: Primary | ICD-10-CM

## 2022-07-08 DIAGNOSIS — J44.1 COPD EXACERBATION (HCC): ICD-10-CM

## 2022-07-08 DIAGNOSIS — I50.33 ACUTE ON CHRONIC DIASTOLIC CONGESTIVE HEART FAILURE (HCC): ICD-10-CM

## 2022-07-08 DIAGNOSIS — R06.02 SOB (SHORTNESS OF BREATH): Primary | ICD-10-CM

## 2022-07-08 DIAGNOSIS — R06.00 DYSPNEA AND RESPIRATORY ABNORMALITIES: Primary | ICD-10-CM

## 2022-07-08 LAB
A/G RATIO: 1.3 (ref 1.1–2.2)
ALBUMIN SERPL-MCNC: 3.8 G/DL (ref 3.4–5)
ALP BLD-CCNC: 77 U/L (ref 40–129)
ALT SERPL-CCNC: 14 U/L (ref 10–40)
ANION GAP SERPL CALCULATED.3IONS-SCNC: 11 MMOL/L (ref 3–16)
AST SERPL-CCNC: 18 U/L (ref 15–37)
BASOPHILS ABSOLUTE: 0 K/UL (ref 0–0.2)
BASOPHILS ABSOLUTE: 0 K/UL (ref 0–0.2)
BASOPHILS RELATIVE PERCENT: 0.1 %
BASOPHILS RELATIVE PERCENT: 0.4 %
BILIRUB SERPL-MCNC: 0.3 MG/DL (ref 0–1)
BUN BLDV-MCNC: 22 MG/DL (ref 7–20)
CALCIUM SERPL-MCNC: 9.4 MG/DL (ref 8.3–10.6)
CHLORIDE BLD-SCNC: 100 MMOL/L (ref 99–110)
CO2: 29 MMOL/L (ref 21–32)
CREAT SERPL-MCNC: 1.1 MG/DL (ref 0.6–1.2)
EOSINOPHILS ABSOLUTE: 0 K/UL (ref 0–0.6)
EOSINOPHILS ABSOLUTE: 0.1 K/UL (ref 0–0.6)
EOSINOPHILS RELATIVE PERCENT: 0 %
EOSINOPHILS RELATIVE PERCENT: 0.5 %
GFR AFRICAN AMERICAN: 58
GFR NON-AFRICAN AMERICAN: 48
GLUCOSE BLD-MCNC: 123 MG/DL (ref 70–99)
HCT VFR BLD CALC: 35.4 % (ref 36–48)
HCT VFR BLD CALC: 36 % (ref 36–48)
HEMOGLOBIN: 11 G/DL (ref 12–16)
HEMOGLOBIN: 11.9 G/DL (ref 12–16)
IMMATURE GRANULOCYTES #: 0 K/UL (ref 0–0.2)
IMMATURE GRANULOCYTES %: 0.1 %
LACTIC ACID, SEPSIS: 2 MMOL/L (ref 0.4–1.9)
LACTIC ACID, SEPSIS: 2.5 MMOL/L (ref 0.4–1.9)
LYMPHOCYTES ABSOLUTE: 0.4 K/UL (ref 1–5.1)
LYMPHOCYTES ABSOLUTE: 0.6 K/UL (ref 1–5.1)
LYMPHOCYTES RELATIVE PERCENT: 4.8 %
LYMPHOCYTES RELATIVE PERCENT: 5.9 %
MCH RBC QN AUTO: 29 PG (ref 26–34)
MCH RBC QN AUTO: 29.5 PG (ref 26–34)
MCHC RBC AUTO-ENTMCNC: 31.1 G/DL (ref 32–36.4)
MCHC RBC AUTO-ENTMCNC: 33.2 G/DL (ref 31–36)
MCV RBC AUTO: 88.8 FL (ref 80–100)
MCV RBC AUTO: 93.4 FL (ref 80–100)
MONOCYTES ABSOLUTE: 0.1 K/UL (ref 0–1.3)
MONOCYTES ABSOLUTE: 0.3 K/UL (ref 0–1.3)
MONOCYTES RELATIVE PERCENT: 0.8 %
MONOCYTES RELATIVE PERCENT: 3.2 %
NEUTROPHILS ABSOLUTE: 8.4 K/UL (ref 1.7–7.7)
NEUTROPHILS ABSOLUTE: 9.5 K/UL (ref 1.7–7.7)
NEUTROPHILS RELATIVE PERCENT: 89.9 %
NEUTROPHILS RELATIVE PERCENT: 94.3 %
PDW BLD-RTO: 15.3 % (ref 12.4–15.4)
PDW BLD-RTO: 15.9 % (ref 12.4–15.4)
PLATELET # BLD: 201 K/UL (ref 135–450)
PLATELET # BLD: 209 K/UL (ref 135–450)
PMV BLD AUTO: 10.5 FL (ref 5–10.5)
PMV BLD AUTO: 9.2 FL (ref 5–10.5)
POTASSIUM REFLEX MAGNESIUM: 3.9 MMOL/L (ref 3.5–5.1)
PRO-BNP: 2136 PG/ML (ref 0–449)
RBC # BLD: 3.79 M/UL (ref 4–5.2)
RBC # BLD: 4.05 M/UL (ref 4–5.2)
SODIUM BLD-SCNC: 140 MMOL/L (ref 136–145)
TOTAL PROTEIN: 6.8 G/DL (ref 6.4–8.2)
TROPONIN: 0.04 NG/ML
WBC # BLD: 10.5 K/UL (ref 4–11)
WBC # BLD: 9 K/UL (ref 4–11)

## 2022-07-08 PROCEDURE — 1200000000 HC SEMI PRIVATE

## 2022-07-08 PROCEDURE — 2500000003 HC RX 250 WO HCPCS: Performed by: PEDIATRICS

## 2022-07-08 PROCEDURE — 83605 ASSAY OF LACTIC ACID: CPT

## 2022-07-08 PROCEDURE — 36415 COLL VENOUS BLD VENIPUNCTURE: CPT

## 2022-07-08 PROCEDURE — 85025 COMPLETE CBC W/AUTO DIFF WBC: CPT

## 2022-07-08 PROCEDURE — 1090F PRES/ABSN URINE INCON ASSESS: CPT | Performed by: FAMILY MEDICINE

## 2022-07-08 PROCEDURE — 6370000000 HC RX 637 (ALT 250 FOR IP): Performed by: NURSE PRACTITIONER

## 2022-07-08 PROCEDURE — 99213 OFFICE O/P EST LOW 20 MIN: CPT | Performed by: FAMILY MEDICINE

## 2022-07-08 PROCEDURE — 6360000002 HC RX W HCPCS: Performed by: HOSPITALIST

## 2022-07-08 PROCEDURE — 3023F SPIROM DOC REV: CPT | Performed by: FAMILY MEDICINE

## 2022-07-08 PROCEDURE — 2580000003 HC RX 258: Performed by: PEDIATRICS

## 2022-07-08 PROCEDURE — 2580000003 HC RX 258: Performed by: HOSPITALIST

## 2022-07-08 PROCEDURE — 71045 X-RAY EXAM CHEST 1 VIEW: CPT

## 2022-07-08 PROCEDURE — G8417 CALC BMI ABV UP PARAM F/U: HCPCS | Performed by: FAMILY MEDICINE

## 2022-07-08 PROCEDURE — 1123F ACP DISCUSS/DSCN MKR DOCD: CPT | Performed by: FAMILY MEDICINE

## 2022-07-08 PROCEDURE — 6370000000 HC RX 637 (ALT 250 FOR IP): Performed by: EMERGENCY MEDICINE

## 2022-07-08 PROCEDURE — 84484 ASSAY OF TROPONIN QUANT: CPT

## 2022-07-08 PROCEDURE — 6370000000 HC RX 637 (ALT 250 FOR IP): Performed by: HOSPITALIST

## 2022-07-08 PROCEDURE — G8427 DOCREV CUR MEDS BY ELIG CLIN: HCPCS | Performed by: FAMILY MEDICINE

## 2022-07-08 PROCEDURE — 83880 ASSAY OF NATRIURETIC PEPTIDE: CPT

## 2022-07-08 PROCEDURE — 93005 ELECTROCARDIOGRAM TRACING: CPT | Performed by: EMERGENCY MEDICINE

## 2022-07-08 PROCEDURE — G8399 PT W/DXA RESULTS DOCUMENT: HCPCS | Performed by: FAMILY MEDICINE

## 2022-07-08 PROCEDURE — 99285 EMERGENCY DEPT VISIT HI MDM: CPT

## 2022-07-08 PROCEDURE — 1036F TOBACCO NON-USER: CPT | Performed by: FAMILY MEDICINE

## 2022-07-08 PROCEDURE — 80053 COMPREHEN METABOLIC PANEL: CPT

## 2022-07-08 PROCEDURE — 6360000002 HC RX W HCPCS: Performed by: EMERGENCY MEDICINE

## 2022-07-08 PROCEDURE — 96375 TX/PRO/DX INJ NEW DRUG ADDON: CPT

## 2022-07-08 PROCEDURE — 96374 THER/PROPH/DIAG INJ IV PUSH: CPT

## 2022-07-08 PROCEDURE — 87040 BLOOD CULTURE FOR BACTERIA: CPT

## 2022-07-08 RX ORDER — METHYLPREDNISOLONE SODIUM SUCCINATE 40 MG/ML
40 INJECTION, POWDER, LYOPHILIZED, FOR SOLUTION INTRAMUSCULAR; INTRAVENOUS EVERY 6 HOURS
Status: COMPLETED | OUTPATIENT
Start: 2022-07-08 | End: 2022-07-10

## 2022-07-08 RX ORDER — POLYETHYLENE GLYCOL 3350 17 G/17G
17 POWDER, FOR SOLUTION ORAL DAILY PRN
Status: DISCONTINUED | OUTPATIENT
Start: 2022-07-08 | End: 2022-07-12 | Stop reason: HOSPADM

## 2022-07-08 RX ORDER — SODIUM CHLORIDE 0.9 % (FLUSH) 0.9 %
5-40 SYRINGE (ML) INJECTION PRN
Status: DISCONTINUED | OUTPATIENT
Start: 2022-07-08 | End: 2022-07-12 | Stop reason: HOSPADM

## 2022-07-08 RX ORDER — PREDNISONE 20 MG/1
40 TABLET ORAL DAILY
Status: DISCONTINUED | OUTPATIENT
Start: 2022-07-11 | End: 2022-07-12 | Stop reason: HOSPADM

## 2022-07-08 RX ORDER — METOPROLOL SUCCINATE 25 MG/1
TABLET, EXTENDED RELEASE ORAL
Qty: 90 TABLET | Refills: 3 | Status: ON HOLD | OUTPATIENT
Start: 2022-07-08

## 2022-07-08 RX ORDER — DOFETILIDE 0.25 MG/1
250 CAPSULE ORAL EVERY 12 HOURS
Status: DISCONTINUED | OUTPATIENT
Start: 2022-07-08 | End: 2022-07-12 | Stop reason: HOSPADM

## 2022-07-08 RX ORDER — IPRATROPIUM BROMIDE AND ALBUTEROL SULFATE 2.5; .5 MG/3ML; MG/3ML
1 SOLUTION RESPIRATORY (INHALATION)
Status: DISCONTINUED | OUTPATIENT
Start: 2022-07-09 | End: 2022-07-11

## 2022-07-08 RX ORDER — FUROSEMIDE 10 MG/ML
40 INJECTION INTRAMUSCULAR; INTRAVENOUS 2 TIMES DAILY
Status: DISCONTINUED | OUTPATIENT
Start: 2022-07-08 | End: 2022-07-09

## 2022-07-08 RX ORDER — METOPROLOL SUCCINATE 25 MG/1
25 TABLET, EXTENDED RELEASE ORAL DAILY
Status: DISCONTINUED | OUTPATIENT
Start: 2022-07-09 | End: 2022-07-12 | Stop reason: HOSPADM

## 2022-07-08 RX ORDER — LEVOFLOXACIN 5 MG/ML
500 INJECTION, SOLUTION INTRAVENOUS EVERY 24 HOURS
Status: DISCONTINUED | OUTPATIENT
Start: 2022-07-08 | End: 2022-07-08 | Stop reason: ALTCHOICE

## 2022-07-08 RX ORDER — METHYLPREDNISOLONE SODIUM SUCCINATE 125 MG/2ML
125 INJECTION, POWDER, LYOPHILIZED, FOR SOLUTION INTRAMUSCULAR; INTRAVENOUS ONCE
Status: COMPLETED | OUTPATIENT
Start: 2022-07-08 | End: 2022-07-08

## 2022-07-08 RX ORDER — ONDANSETRON 4 MG/1
4 TABLET, ORALLY DISINTEGRATING ORAL EVERY 8 HOURS PRN
Status: DISCONTINUED | OUTPATIENT
Start: 2022-07-08 | End: 2022-07-08 | Stop reason: ALTCHOICE

## 2022-07-08 RX ORDER — ALBUTEROL SULFATE 2.5 MG/3ML
2.5 SOLUTION RESPIRATORY (INHALATION) EVERY 4 HOURS PRN
Status: DISCONTINUED | OUTPATIENT
Start: 2022-07-08 | End: 2022-07-12 | Stop reason: HOSPADM

## 2022-07-08 RX ORDER — ACETAMINOPHEN 325 MG/1
650 TABLET ORAL EVERY 6 HOURS PRN
Status: DISCONTINUED | OUTPATIENT
Start: 2022-07-08 | End: 2022-07-12 | Stop reason: HOSPADM

## 2022-07-08 RX ORDER — ENOXAPARIN SODIUM 100 MG/ML
30 INJECTION SUBCUTANEOUS 2 TIMES DAILY
Status: CANCELLED | OUTPATIENT
Start: 2022-07-08

## 2022-07-08 RX ORDER — ACETAMINOPHEN 650 MG/1
650 SUPPOSITORY RECTAL EVERY 6 HOURS PRN
Status: DISCONTINUED | OUTPATIENT
Start: 2022-07-08 | End: 2022-07-12 | Stop reason: HOSPADM

## 2022-07-08 RX ORDER — SODIUM CHLORIDE 9 MG/ML
INJECTION, SOLUTION INTRAVENOUS PRN
Status: DISCONTINUED | OUTPATIENT
Start: 2022-07-08 | End: 2022-07-12 | Stop reason: HOSPADM

## 2022-07-08 RX ORDER — FUROSEMIDE 10 MG/ML
40 INJECTION INTRAMUSCULAR; INTRAVENOUS ONCE
Status: COMPLETED | OUTPATIENT
Start: 2022-07-08 | End: 2022-07-08

## 2022-07-08 RX ORDER — SODIUM CHLORIDE 9 MG/ML
INJECTION, SOLUTION INTRAVENOUS CONTINUOUS
Status: DISCONTINUED | OUTPATIENT
Start: 2022-07-08 | End: 2022-07-09

## 2022-07-08 RX ORDER — IPRATROPIUM BROMIDE AND ALBUTEROL SULFATE 2.5; .5 MG/3ML; MG/3ML
1 SOLUTION RESPIRATORY (INHALATION) ONCE
Status: COMPLETED | OUTPATIENT
Start: 2022-07-08 | End: 2022-07-08

## 2022-07-08 RX ORDER — LORAZEPAM 0.5 MG/1
0.5 TABLET ORAL ONCE
Status: COMPLETED | OUTPATIENT
Start: 2022-07-08 | End: 2022-07-08

## 2022-07-08 RX ORDER — NITROGLYCERIN 0.4 MG/1
0.4 TABLET SUBLINGUAL EVERY 5 MIN PRN
Status: DISCONTINUED | OUTPATIENT
Start: 2022-07-08 | End: 2022-07-12 | Stop reason: HOSPADM

## 2022-07-08 RX ORDER — FUROSEMIDE 40 MG/1
40 TABLET ORAL DAILY
Status: CANCELLED | OUTPATIENT
Start: 2022-07-08

## 2022-07-08 RX ORDER — ROSUVASTATIN CALCIUM 20 MG/1
20 TABLET, COATED ORAL DAILY
Status: DISCONTINUED | OUTPATIENT
Start: 2022-07-09 | End: 2022-07-12 | Stop reason: HOSPADM

## 2022-07-08 RX ORDER — LISINOPRIL 20 MG/1
20 TABLET ORAL DAILY
Status: DISCONTINUED | OUTPATIENT
Start: 2022-07-09 | End: 2022-07-12 | Stop reason: HOSPADM

## 2022-07-08 RX ORDER — ALBUTEROL SULFATE 90 UG/1
1 AEROSOL, METERED RESPIRATORY (INHALATION) EVERY 4 HOURS PRN
Status: DISCONTINUED | OUTPATIENT
Start: 2022-07-08 | End: 2022-07-12 | Stop reason: HOSPADM

## 2022-07-08 RX ORDER — ONDANSETRON 2 MG/ML
4 INJECTION INTRAMUSCULAR; INTRAVENOUS EVERY 6 HOURS PRN
Status: DISCONTINUED | OUTPATIENT
Start: 2022-07-08 | End: 2022-07-08 | Stop reason: ALTCHOICE

## 2022-07-08 RX ORDER — SODIUM CHLORIDE 0.9 % (FLUSH) 0.9 %
5-40 SYRINGE (ML) INJECTION EVERY 12 HOURS SCHEDULED
Status: DISCONTINUED | OUTPATIENT
Start: 2022-07-08 | End: 2022-07-12 | Stop reason: HOSPADM

## 2022-07-08 RX ADMIN — DOFETILIDE 250 MCG: 0.25 CAPSULE ORAL at 22:52

## 2022-07-08 RX ADMIN — SODIUM CHLORIDE, PRESERVATIVE FREE 10 ML: 5 INJECTION INTRAVENOUS at 22:53

## 2022-07-08 RX ADMIN — SODIUM CHLORIDE: 9 INJECTION, SOLUTION INTRAVENOUS at 22:57

## 2022-07-08 RX ADMIN — METHYLPREDNISOLONE SODIUM SUCCINATE 40 MG: 40 INJECTION, POWDER, FOR SOLUTION INTRAMUSCULAR; INTRAVENOUS at 22:53

## 2022-07-08 RX ADMIN — METHYLPREDNISOLONE SODIUM SUCCINATE 125 MG: 125 INJECTION, POWDER, FOR SOLUTION INTRAMUSCULAR; INTRAVENOUS at 15:24

## 2022-07-08 RX ADMIN — IPRATROPIUM BROMIDE AND ALBUTEROL SULFATE 1 AMPULE: .5; 3 SOLUTION RESPIRATORY (INHALATION) at 15:25

## 2022-07-08 RX ADMIN — LORAZEPAM 0.5 MG: 0.5 TABLET ORAL at 23:35

## 2022-07-08 RX ADMIN — FUROSEMIDE 40 MG: 10 INJECTION, SOLUTION INTRAMUSCULAR; INTRAVENOUS at 16:38

## 2022-07-08 RX ADMIN — DOXYCYCLINE 100 MG: 100 INJECTION, POWDER, LYOPHILIZED, FOR SOLUTION INTRAVENOUS at 23:00

## 2022-07-08 RX ADMIN — FUROSEMIDE 40 MG: 10 INJECTION, SOLUTION INTRAMUSCULAR; INTRAVENOUS at 22:52

## 2022-07-08 ASSESSMENT — ENCOUNTER SYMPTOMS
WHEEZING: 0
EYE DISCHARGE: 0
EYE PAIN: 0
DIARRHEA: 0
SHORTNESS OF BREATH: 1
NAUSEA: 0
VOMITING: 0
ABDOMINAL PAIN: 0
COUGH: 1
BACK PAIN: 0
RHINORRHEA: 0
SORE THROAT: 0

## 2022-07-08 ASSESSMENT — PAIN SCALES - GENERAL: PAINLEVEL_OUTOF10: 0

## 2022-07-08 NOTE — ED TRIAGE NOTES
Pt presents to ED via [address] with  from PCP office with c/o of SOB x1 week. States has mild productive cough with clear sputum. +COPD. +HTN noted. 93% on RA but requesting to wear O2 to help her breathing. Resp even and unlabored. A/ox4. Denies any need at this time. Call light within reach. Bed in lowest position. Will continue to monitor.

## 2022-07-08 NOTE — H&P
Hospital Medicine History & Physical      PCP: Raimundo Lee MD    Date of Admission: 7/8/2022    Date of Service: Pt seen/examined on 7/8/2022 and Admitted to Inpatient with expected LOS greater than two midnights due to medical therapy. Chief Complaint: Shortness of breath      History Of Present Illness:      69 y.o. female with PMHx of A-fib, CHF, COPD, HLD and HTN presented to [facility] in transfer from [place] emergency department for management of COPD and CHF. Patient presented to [place] emergency department with 1 week history of shortness of breath. She has minimal sputum production. She has been using her inhalers without much improvement. She does use oxygen at night. She denies leg swelling. No chest pain or pressure. She had gone to her PCP this afternoon requesting a CT of her chest.  He referred her to the emergency department for evaluation. Past Medical History:          Diagnosis Date    AAA (abdominal aortic aneurysm) ([facility]     pt states it is 4cm    AAA (abdominal aortic aneurysm) without rupture (HCC) 2/10/2015    Atrial fibrillation (HCC)     CAD (coronary artery disease)     CHF (congestive heart failure) ([facility]     COPD (chronic obstructive pulmonary disease) (HCC)     History of blood clots     Hyperlipidemia     Hypertension        Past Surgical History:          Procedure Laterality Date    ABDOMINAL AORTIC ANEURYSM REPAIR      Endovascular abdominal AA    APPENDECTOMY  1990    incidental    BLADDER SUSPENSION      CARDIAC SURGERY      CATARACT REMOVAL      CHOLECYSTECTOMY  10/15/13    COLONOSCOPY  9/2/07    dr Abilio Putnam and check in 5 years.  COLONOSCOPY  06/16/2017    ok dr arndt, repeat 5 years    HYSTERECTOMY ([facility])  1990    for benign tumor.  just the uterus    JOINT REPLACEMENT  12/2013    right knee replacement    TONSILLECTOMY  as a child    TUMOR EXCISION      benign behind right ear about 2008       Medications Prior to Admission:      Prior to Admission medications    Medication Sig Start Date End Date Taking?  Authorizing Provider   metoprolol succinate (TOPROL XL) 25 MG extended release tablet TAKE ONE TABLET BY MOUTH DAILY 7/8/22   Dejuan Palafox MD   dofetilide (TIKOSYN) 250 MCG capsule Take 1 capsule by mouth every 12 hours 6/27/22   Evelyn Moreno MD   furosemide (LASIX) 40 MG tablet Take 1 tablet by mouth 2 times daily 6/24/22   Berneta Bence, MD   lisinopril (PRINIVIL;ZESTRIL) 20 MG tablet Take 1 tablet by mouth daily 3/21/22   CHRISTINE Steven - CNP   albuterol sulfate  (90 Base) MCG/ACT inhaler INHALE TWO PUFFS BY MOUTH EVERY 4 HOURS AS NEEDED FOR WHEEZING OR FOR SHORTNESS OF BREATH 2/10/22   Leighton Panning, DO   furosemide (LASIX) 40 MG tablet Take 1 tablet by mouth daily 2/5/22   Nicolás Ugalde MD   rosuvastatin (CRESTOR) 20 MG tablet Take 1 tablet by mouth daily 1/25/22   CHRISTINE Steven - CNP   budesonide-formoterol [facility]) 160-4.5 MCG/ACT AERO Inhale 2 puffs into the lungs 2 times daily 1/13/22   Leighton Panning, DO   rivaroxaban (XARELTO) 20 MG TABS tablet TAKE ONE TABLET BY MOUTH DAILY WITH BREAKFAST 10/20/21   Leighton Panning, DO   clobetasol (TEMOVATE) 0.05 % cream Apply topically 2 times daily as needed for Rash Apply to rash between genital area and buttocks and around anus bid prn x up to 2 weeks -need at least 1 week break prior to restarting 8/31/21 8/31/22  Historical Provider, MD   triamcinolone (KENALOG) 0.1 % ointment Apply topically 2 times daily as needed for Rash Apply to rash between genital area and buttocks and around anus bid prn during breaks from clobetasol 8/31/21 8/31/22  Historical Provider, MD   albuterol (PROVENTIL) (2.5 MG/3ML) 0.083% nebulizer solution Take 3 mLs by nebulization every 4 hours as needed for Wheezing 7/8/21   CHRISTINE Pelaez - CNP   nitroGLYCERIN (NITROSTAT) 0.4 MG SL tablet Place 1 tablet under the tongue every 5 minutes as needed for Chest pain 3/5/19   Gabriella Jansen MD   Multiple Vitamins-Minerals (MULTI FOR HER 50+ PO) Take 1 tablet by mouth daily    Historical Provider, MD       Allergies:  Morphine    Social History:      The patient currently lives at home with spouse    TOBACCO:   reports that she quit smoking about 8 years ago. Her smoking use included cigarettes. She started smoking about 45 years ago. She has a 37.00 pack-year smoking history. She has never used smokeless tobacco.  ETOH:   reports no history of alcohol use. Family History:      Reviewed in detail positive as follows:        Problem Relation Age of Onset    Heart Disease Father     Hypertension Other        REVIEW OF SYSTEMS:   Pertinent positives as noted in the HPI. All other systems reviewed and negative. PHYSICAL EXAM PERFORMED:    BP (!) 237/147 Comment: NP made aware  Pulse (!) 125 Comment: NP made aware  Temp 98.4 °F (36.9 °C) (Oral)   Resp 20   Ht 5' 4\" (1.626 m)   Wt 223 lb 1.7 oz (101.2 kg)   SpO2 97%   BMI 38.30 kg/m²     General appearance:  Well developed, well nourished, elderly  female with increased respiratory effort but in no acute distress, appears stated age and cooperative. HEENT:  Normal cephalic, atraumatic without obvious deformity. Pupils equal, round, and reactive to light. Conjunctivae/corneas clear. Neck: Supple, with full range of motion. No jugular venous distention. Trachea midline. Respiratory:  Increased respiratory effort. Wheezing bilaterally with faint bibasilar rales. No accessory muscle use. Cardiovascular:  Tachycardic rate with regular rhythm without murmurs, no lower extremity edema. (pt just arrived from [place] ED) - increased exertion  Abdomen: Soft, obese abdomen, non-tender, non-distended, without rebound or guarding. Normal bowel sounds. Musculoskeletal:  Moves all extremities equally. Full range of motion without deformity.   Skin: Skin warm, dry and intact. No rashes or lesions. Neurologic:  Neurovascularly intact without any focal sensory/motor deficits. Cranial nerves: II-XII intact, grossly non-focal.  Psychiatric:  Alert and oriented, thought content appropriate, normal insight  Capillary Refill: Brisk,< 3 seconds   Peripheral Pulses: +2 palpable, equal bilaterally       Labs:     Recent Labs     07/08/22  1516   WBC 10.5   HGB 11.0*   HCT 35.4*        Recent Labs     07/08/22  1516      K 3.9      CO2 29   BUN 22*   CREATININE 1.1   CALCIUM 9.4     Recent Labs     07/08/22  1516   AST 18   ALT 14   BILITOT 0.3   ALKPHOS 77     No results for input(s): INR in the last 72 hours. Recent Labs     07/08/22  1516   TROPONINI 0.04*       Urinalysis:      Lab Results   Component Value Date/Time    NITRU Negative 09/09/2021 11:52 AM    WBCUA 1 09/09/2021 11:52 AM    BACTERIA 1+ 09/24/2020 06:30 PM    RBCUA 1 09/09/2021 11:52 AM    BLOODU TRACE 09/09/2021 11:52 AM    SPECGRAV 1.010 09/09/2021 11:52 AM    GLUCOSEU Negative 09/09/2021 11:52 AM       Radiology:     EKG:  I have reviewed the EKG with the following interpretation: Normal sinus rhythm, ventricular rate 78. No acute ST elevation. XR CHEST PORTABLE   Final Result   Increased lung markings bilaterally, may be related to mild pulmonary   vascular congestion versus bronchitis. Stable mild cardiomegaly. ASSESSMENT:    Active Hospital Problems    Diagnosis Date Noted    SOB (shortness of breath) [R06.02] 07/08/2022     Priority: Medium         PLAN:    Shortness of breath due to COPD exacerbation and CHF  - mostly related to COPD but does have component of CHF with bibasilar Rales  - Nebulizer treatments every 4 hours and every 2 hours prn   - Solumedrol and Antibiotics have been prescribed. - Solumedrol will be tapered as the patient improves. - Patient will be monitored closely, and deep breathing and coughing will be encouraged while awake.    - last ECHO: 9/2021 EF 61% Grade 2 Diastolic Dysfunction  - bnp 2136  - lasix 40 mg IV BID  - cont metoprolol and lisinopril  - daily wts  - I/Os  - consult CHF RN  - consult cardiology    Atrial Fibrillation   - currently rate controlled  - continue Xarelto, Dofetilide and metoprolol     Essential (primary) hypertension   - monitor blood pressure  - continue home meds     Hyperlipidemia   - continue statin    DVT Prophylaxis: Xarelto  Diet: ADULT DIET; Regular  Code Status: Full Code    Dispo - Inpatient       CHRISTINE Miller - CNP    Thank you Sheba Sparks MD for the opportunity to be involved in this patient's care. If you have any questions or concerns please feel free to contact me at [phone].

## 2022-07-08 NOTE — TELEPHONE ENCOUNTER
Last OV: 6/24/22  Next OV: 7/14/22  Last refill:2/4/22  Most recent Labs: 6/24/22  Last EKG (if needed):6/24/22

## 2022-07-08 NOTE — PROGRESS NOTES
Subjective:      Patient ID: Gage Shannon is a 69 y.o. female.     Chief Complaint   Patient presents with    Shortness of Breath     wants a CT of lung        Patient presents with:  Shortness of Breath: wants a CT of lung    Here for the above  She has chronic failure and copd     She states the breathing has gotten a lot worse for the last week  No fever  No cough  No cp   No swelling  Urine is passing well     YOB: 1945    Date of Visit:  7/8/2022     -- Morphine -- Rash    --  rash    Current Outpatient Medications:  metoprolol succinate (TOPROL XL) 25 MG extended release tablet, TAKE ONE TABLET BY MOUTH DAILY, Disp: 90 tablet, Rfl: 3  dofetilide (TIKOSYN) 250 MCG capsule, Take 1 capsule by mouth every 12 hours, Disp: 60 capsule, Rfl: 5  furosemide (LASIX) 40 MG tablet, Take 1 tablet by mouth 2 times daily, Disp: 180 tablet, Rfl: 3  lisinopril (PRINIVIL;ZESTRIL) 20 MG tablet, Take 1 tablet by mouth daily, Disp: 30 tablet, Rfl: 3  albuterol sulfate  (90 Base) MCG/ACT inhaler, INHALE TWO PUFFS BY MOUTH EVERY 4 HOURS AS NEEDED FOR WHEEZING OR FOR SHORTNESS OF BREATH, Disp: 1 each, Rfl: 3  furosemide (LASIX) 40 MG tablet, Take 1 tablet by mouth daily, Disp: 90 tablet, Rfl: 1  rosuvastatin (CRESTOR) 20 MG tablet, Take 1 tablet by mouth daily, Disp: 90 tablet, Rfl: 2  budesonide-formoterol (SYMBICORT) 160-4.5 MCG/ACT AERO, Inhale 2 puffs into the lungs 2 times daily, Disp: 1 each, Rfl: 5  rivaroxaban (XARELTO) 20 MG TABS tablet, TAKE ONE TABLET BY MOUTH DAILY WITH BREAKFAST, Disp: 30 tablet, Rfl: 11  clobetasol (TEMOVATE) 0.05 % cream, Apply topically 2 times daily as needed for Rash Apply to rash between genital area and buttocks and around anus bid prn x up to 2 weeks -need at least 1 week break prior to restarting, Disp: , Rfl:   mupirocin (BACTROBAN) 2 % ointment, Apply topically 3 times daily Apply to scabs three times daily for 10 days or until healed, Disp: , Rfl:   triamcinolone (KENALOG) 0.1 % ointment, Apply topically 2 times daily as needed for Rash Apply to rash between genital area and buttocks and around anus bid prn during breaks from clobetasol, Disp: , Rfl:   albuterol (PROVENTIL) (2.5 MG/3ML) 0.083% nebulizer solution, Take 3 mLs by nebulization every 4 hours as needed for Wheezing, Disp: 540 mL, Rfl: 2  nitroGLYCERIN (NITROSTAT) 0.4 MG SL tablet, Place 1 tablet under the tongue every 5 minutes as needed for Chest pain, Disp: 25 tablet, Rfl: 1  Multiple Vitamins-Minerals (MULTI FOR HER 50+ PO), Take 1 tablet by mouth daily, Disp: , Rfl:     No current facility-administered medications for this visit.      ---------------------------------------------------------               07/08/22 07/08/22 07/08/22                    1425            1427          1440       ---------------------------------------------------------   BP:        (!) 176/100    (!) 176/100    (!) 170/86     Site:    Left Upper Arm  Left Upper ArmLeft Upper Arm   Position:     Sitting        Sitting                     Cuff Size:   Large Adult    Large Adult   Large Adult     Pulse:                                       84         Temp:   97.3 °F (36.3 °C)                               TempSrc:    Temporal                                    SpO2:          96%                                      Weight: 226 lb (102.5 kg)                               Height:  5' 4\" (1.626 m)                               ---------------------------------------------------------  Body mass index is 38.79 kg/m². Wt Readings from Last 3 Encounters:  07/08/22 : 226 lb (102.5 kg)  06/24/22 : 221 lb (100.2 kg)  06/16/22 : 232 lb (105.2 kg)    BP Readings from Last 3 Encounters:  07/08/22 : (!) 170/86  06/24/22 : 138/80  06/17/22 : (!) 164/72          Review of Systems    Objective:   Physical Exam  Constitutional:       General: She is in acute distress. Appearance: She is well-developed. She is not diaphoretic. Comments: She does not appear to be feeling well   C/o can not breath   Cardiovascular:      Rate and Rhythm: Normal rate and regular rhythm. Heart sounds: Normal heart sounds. No murmur heard. No friction rub. No gallop. Pulmonary:      Effort: Pulmonary effort is normal. No tachypnea, accessory muscle usage or respiratory distress. Breath sounds: Normal breath sounds. No decreased breath sounds, wheezing, rhonchi or rales. Comments: Few rales at the bases   Chest:   Breasts:      Right: No supraclavicular adenopathy. Left: No supraclavicular adenopathy. Lymphadenopathy:      Cervical: No cervical adenopathy. Upper Body:      Right upper body: No supraclavicular adenopathy. Left upper body: No supraclavicular adenopathy. Skin:     General: Skin is warm and dry. Coloration: Skin is not pale. Neurological:      Mental Status: She is alert. Assessment:        Diagnosis Orders   1. SOB (shortness of breath)     2. COPD exacerbation ([address]     3.  Chronic diastolic heart failure ([address]         She continues to use the O2 at night, intolerant of cpap   A chest xray is pending from the pulmonary   She had a ct of the chest less than a year ago showing the chronic disease     Cardiac note from 6/24/22 for her A fib, chronic diastolic heart failure     She did not want to go by ambulance  She did not want to go to local er  She wanted to go straight to the main hospital    here and he will drive her     They changed their mind and will go to ER here       Plan:      Go to the ER   I called and discussed        Gabriella Jansen MD

## 2022-07-08 NOTE — ED NOTES
[facility] here at this time to transport patient to [facility]. All paperwork and patient belongings sent with patient.       Marvis Severin, RN  07/08/22 [id number]

## 2022-07-08 NOTE — ED PROVIDER NOTES
[facility]  eMERGENCY dEPARTMENT eNCOUnter        Pt Name: Kia Marina  MRN: [id number]  [place] 1945  Date of evaluation: 7/8/2022  Provider: Kavya Mittal MD  PCP: Delia Robbins MD      [address] [place] [address]       Chief Complaint   Patient presents with    Shortness of Breath     x1 week. mild cough. +COPD       HISTORY OFPRESENT ILLNESS   (Location/Symptom, Timing/Onset, Context/Setting, Quality, Duration, Modifying Factors,Severity)  Note limiting factors. Kia Marina is a 69 y.o. female       Location/Symptom: Shortness of breath and blood pressure is high  Timing/Onset: She has been having shortness of breath for the past 1 week  Context/Setting: History of CHF CAD COPD. Reportedly she has diastolic congestive heart failure near card association class II/III . She also has a history of atrial fibrillation in the past.  She is on Xarelto. She presented to her primary care provider's office with shortness of breath and referred to the emergency department. Quality: Just short of breath. Cough is minimally productive  Duration: 1 week  Modifying Factors: She does have home aerosols. She is not on home oxygen. No chest pain or chest pressure. No fever chills nausea vomiting abdominal pain. She does have a history of atrial fibrillation and is on Xarelto. Nursing Noteswere all reviewed and agreed with or any disagreements were addressed  in the HPI. REVIEW OF SYSTEMS    (2-9 systems for level 4, 10 or more for level 5)     Review of Systems   Constitutional: Negative for chills, fatigue and fever. HENT: Negative for ear pain, rhinorrhea and sore throat. Eyes: Negative for pain, discharge and visual disturbance. Respiratory: Positive for cough and shortness of breath. Negative for wheezing. Cardiovascular: Negative for chest pain, palpitations and leg swelling. Gastrointestinal: Negative for abdominal pain, diarrhea, nausea and vomiting. Genitourinary: Negative for difficulty urinating, dysuria, pelvic pain and vaginal discharge. Musculoskeletal: Negative for arthralgias, back pain, joint swelling and neck pain. Skin: Negative for rash. Allergic/Immunologic: Negative for environmental allergies. Neurological: Negative for dizziness, seizures, syncope and headaches. Hematological: Negative for adenopathy. Psychiatric/Behavioral: Negative for dysphoric mood and suicidal ideas. The patient is not nervous/anxious. PAST MEDICAL HISTORY     Past Medical History:   Diagnosis Date    AAA (abdominal aortic aneurysm) ([facility] [address]     pt states it is 4cm    AAA (abdominal aortic aneurysm) without rupture (HCC) 2/10/2015    Atrial fibrillation (HCC)     CAD (coronary artery disease)     CHF (congestive heart failure) (HCC)     COPD (chronic obstructive pulmonary disease) (HCC)     History of blood clots     Hyperlipidemia     Hypertension          SURGICAL HISTORY     Past Surgical History:   Procedure Laterality Date    ABDOMINAL AORTIC ANEURYSM REPAIR      Endovascular abdominal AA    APPENDECTOMY  1990    incidental    BLADDER SUSPENSION      CARDIAC SURGERY      CATARACT REMOVAL      CHOLECYSTECTOMY  10/15/13    COLONOSCOPY  9/2/07    dr Shay Common and check in 5 years.  COLONOSCOPY  06/16/2017    ok dr arndt, repeat 5 years    HYSTERECTOMY ([facility])  1990    for benign tumor.  just the uterus    JOINT REPLACEMENT  12/2013    right knee replacement    TONSILLECTOMY  as a child    TUMOR EXCISION      benign behind right ear about 2008         CURRENTMEDICATIONS       Previous Medications    ALBUTEROL (PROVENTIL) (2.5 MG/3ML) 0.083% NEBULIZER SOLUTION    Take 3 mLs by nebulization every 4 hours as needed for Wheezing    ALBUTEROL SULFATE  (90 BASE) MCG/ACT INHALER    INHALE TWO PUFFS BY MOUTH EVERY 4 HOURS AS NEEDED FOR WHEEZING OR FOR SHORTNESS OF BREATH    BUDESONIDE-FORMOTEROL (SYMBICORT) 160-4.5 MCG/ACT AERO    Inhale 2 puffs into the lungs 2 times daily    CLOBETASOL (TEMOVATE) 0.05 % CREAM    Apply topically 2 times daily as needed for Rash Apply to rash between genital area and buttocks and around anus bid prn x up to 2 weeks -need at least 1 week break prior to restarting    DOFETILIDE (TIKOSYN) 250 MCG CAPSULE    Take 1 capsule by mouth every 12 hours    FUROSEMIDE (LASIX) 40 MG TABLET    Take 1 tablet by mouth daily    FUROSEMIDE (LASIX) 40 MG TABLET    Take 1 tablet by mouth 2 times daily    LISINOPRIL (PRINIVIL;ZESTRIL) 20 MG TABLET    Take 1 tablet by mouth daily    METOPROLOL SUCCINATE (TOPROL XL) 25 MG EXTENDED RELEASE TABLET    TAKE ONE TABLET BY MOUTH DAILY    MULTIPLE VITAMINS-MINERALS (MULTI FOR HER 50+ PO)    Take 1 tablet by mouth daily    MUPIROCIN (BACTROBAN) 2 % OINTMENT    Apply topically 3 times daily Apply to scabs three times daily for 10 days or until healed    NITROGLYCERIN (NITROSTAT) 0.4 MG SL TABLET    Place 1 tablet under the tongue every 5 minutes as needed for Chest pain    RIVAROXABAN (XARELTO) 20 MG TABS TABLET    TAKE ONE TABLET BY MOUTH DAILY WITH BREAKFAST    ROSUVASTATIN (CRESTOR) 20 MG TABLET    Take 1 tablet by mouth daily    TRIAMCINOLONE (KENALOG) 0.1 % OINTMENT    Apply topically 2 times daily as needed for Rash Apply to rash between genital area and buttocks and around anus bid prn during breaks from clobetasol       ALLERGIES     Morphine    FAMILY HISTORY       Family History   Problem Relation Age of Onset    Heart Disease Father     Hypertension Other           SOCIAL HISTORY       Social History     Socioeconomic History    Marital status:      Spouse name: None    Number of children: None    Years of education: None    Highest education level: None   Occupational History    Occupation: housewife   Tobacco Use    Smoking status: Former Smoker     Packs/day: 1.00     Years: 37.00     Pack years: 37.00     Types: Cigarettes     Start date: 1976     Quit date: 2013     Years since quittin.8    Smokeless tobacco: Never Used    Tobacco comment: E cigarette now and then   Vaping Use    Vaping Use: Some days    Substances: Nicotine   Substance and Sexual Activity    Alcohol use: No    Drug use: No    Sexual activity: Yes     Partners: Male   Other Topics Concern    None   Social History Narrative    None     Social Determinants of Health     Financial Resource Strain: Low Risk     Difficulty of Paying Living Expenses: Not hard at all   Food Insecurity: No Food Insecurity    Worried About Running Out of Food in the Last Year: Never true    Sarah of Food in the Last Year: Never true   Transportation Needs: No Transportation Needs    Lack of Transportation (Medical): No    Lack of Transportation (Non-Medical):  No   Physical Activity:     Days of Exercise per Week: Not on file    Minutes of Exercise per Session: Not on file   Stress:     Feeling of Stress : Not on file   Social Connections:     Frequency of Communication with Friends and Family: Not on file    Frequency of Social Gatherings with Friends and Family: Not on file    Attends [employer] Services: Not on file    Active Member of [employer] or Organizations: Not on file    Attends Club or Organization Meetings: Not on file    Marital Status: Not on file   Intimate Partner Violence:     Fear of Current or Ex-Partner: Not on file    Emotionally Abused: Not on file    Physically Abused: Not on file    Sexually Abused: Not on file   Housing Stability:     Unable to Pay for Housing in the Last Year: Not on file    Number of [place] in the Last Year: Not on file    Unstable Housing in the Last Year: Not on file       SCREENINGS             PHYSICAL EXAM    (up to 7 for level 4, 8 or more for level 5)     ED Triage Vitals [22 1445]   BP Temp Temp Source Heart Rate Resp SpO2 Height Weight   (!) 202/73 98.2 °F (36.8 °C) Tympanic 82 19 93 % 5' 4\" (1.626 m) 226 lb (102.5 kg)      height is 5' 4\" (1.626 m) and weight is 226 lb (102.5 kg). Her tympanic temperature is 98.2 °F (36.8 °C). Her blood pressure is 188/65 (abnormal) and her pulse is 77. Her respiration is 21 and oxygen saturation is 97%. Physical Exam  Constitutional:       Appearance: She is well-developed. She is not diaphoretic. HENT:      Head: Normocephalic and atraumatic. Right Ear: External ear normal.      Left Ear: External ear normal.   Eyes:      General: No scleral icterus. Right eye: No discharge. Left eye: No discharge. Neck:      Thyroid: No thyromegaly. Vascular: No JVD. Trachea: No tracheal deviation. Cardiovascular:      Rate and Rhythm: Normal rate and regular rhythm. Heart sounds: No murmur heard. No friction rub. No gallop. Pulmonary:      Effort: Tachypnea, prolonged expiration and respiratory distress (Mild) present. Breath sounds: Decreased air movement present. No stridor. Wheezing present. No rales. Abdominal:      General: There is no distension. Palpations: Abdomen is soft. Tenderness: There is no abdominal tenderness. There is no guarding or rebound. Musculoskeletal:         General: No tenderness. Cervical back: Normal range of motion. Skin:     General: Skin is warm and dry. Findings: No rash (On exposed body surfaces). Neurological:      Mental Status: She is alert and oriented to person, place, and time. Coordination: Coordination normal.   Psychiatric:         Behavior: Behavior normal.         Thought Content:  Thought content normal.         DIAGNOSTIC RESULTS   LABS:    Results for orders placed or performed during the hospital encounter of 07/08/22   CBC with Auto Differential   Result Value Ref Range    WBC 10.5 4.0 - 11.0 K/uL    RBC 3.79 (L) 4.00 - 5.20 M/uL    Hemoglobin 11.0 (L) 12.0 - 16.0 g/dL    Hematocrit 35.4 (L) 36.0 - 48.0 %    MCV 93.4 80.0 - 100.0 fL    MCH 29.0 26.0 - 34.0 pg MCHC 31.1 (L) 32.0 - 36.4 g/dL    RDW 15.3 12.4 - 15.4 %    Platelets 461 404 - 087 K/uL    MPV 10.5 5.0 - 10.5 fL    Neutrophils % 89.9 %    Immature Granulocytes % 0.1 %    Lymphocytes % 5.9 %    Monocytes % 3.2 %    Eosinophils % 0.5 %    Basophils % 0.4 %    Neutrophils Absolute 9.5 (H) 1.7 - 7.7 K/uL    Immature Granulocytes # 0.0 0.0 - 0.2 K/uL    Lymphocytes Absolute 0.6 (L) 1.0 - 5.1 K/uL    Monocytes Absolute 0.3 0.0 - 1.3 K/uL    Eosinophils Absolute 0.1 0.0 - 0.6 K/uL    Basophils Absolute 0.0 0.0 - 0.2 K/uL   Comprehensive Metabolic Panel w/ Reflex to MG   Result Value Ref Range    Sodium 140 136 - 145 mmol/L    Potassium reflex Magnesium 3.9 3.5 - 5.1 mmol/L    Chloride 100 99 - 110 mmol/L    CO2 29 21 - 32 mmol/L    Anion Gap 11 3 - 16    Glucose 123 (H) 70 - 99 mg/dL    BUN 22 (H) 7 - 20 mg/dL    CREATININE 1.1 0.6 - 1.2 mg/dL    GFR Non- 48 (A) >60    GFR  58 (A) >60    Calcium 9.4 8.3 - 10.6 mg/dL    Total Protein 6.8 6.4 - 8.2 g/dL    Albumin 3.8 3.4 - 5.0 g/dL    Albumin/Globulin Ratio 1.3 1.1 - 2.2    Total Bilirubin 0.3 0.0 - 1.0 mg/dL    Alkaline Phosphatase 77 40 - 129 U/L    ALT 14 10 - 40 U/L    AST 18 15 - 37 U/L   Troponin   Result Value Ref Range    Troponin 0.04 (H) <0.01 ng/mL   Brain Natriuretic Peptide   Result Value Ref Range    Pro-BNP 2,136 (H) 0 - 449 pg/mL   EKG 12 Lead   Result Value Ref Range    Ventricular Rate 78 BPM    Atrial Rate 78 BPM    P-R Interval 194 ms    QRS Duration 88 ms    Q-T Interval 426 ms    QTc Calculation (Bazett) 485 ms    P Axis 79 degrees    R Axis -20 degrees    T Axis 36 degrees    Diagnosis       Normal sinus rhythmMinimal voltage criteria for LVH, may be normal variantSeptal infarct (cited on or before 07-JAN-2018)Abnormal ECGWhen compared with ECG of 26-MAY-2022 10:01,Questionable change in initial forces of Anterior leads       All other labs were within normal range or not returned as of this dictation. EKG:  All EKG's are interpreted by the Emergency Department Physician who either signs orCo-signs this chart in the absence of a cardiologist.    EKG visualized preliminarily interpreted by myself shows sinus rhythm at a rate of 78. Axis is -20. Left ventricular hypertrophy is seen in lead aVL. Evidence of old septal wall infarct but no acute injury or acute ischemia. Intervals are normal.    RADIOLOGY:   plain film images such as CT, Ultrasound and MRI are read by the radiologist. Nicole Figueroa radiographic images are visualized and preliminarily interpreted by the  EDProvider with the below findings:    XR CHEST PORTABLE    Result Date: 7/8/2022  EXAMINATION: ONE XRAY VIEW OF THE CHEST 7/8/2022 3:13 pm COMPARISON: June 24, 2022 HISTORY: ORDERING SYSTEM PROVIDED HISTORY: SOB/COPD/CHF TECHNOLOGIST PROVIDED HISTORY: Reason for exam:->SOB/COPD/CHF Reason for Exam: increased SOB, COPD FINDINGS: The cardiomediastinal silhouette is stable. There are increased lung markings bilaterally, may be related to mild pulmonary vascular congestion versus bronchitis. There is no pleural effusion. There is no pneumothorax. There is no acute osseous abnormality. Increased lung markings bilaterally, may be related to mild pulmonary vascular congestion versus bronchitis. Stable mild cardiomegaly. PROCEDURES   Unless otherwise noted below, none     Procedures    CRITICAL CARE TIME   CRITICAL CARE: There was a high probability of clinically significant/life threatening deterioration in this patient's condition which required my urgent intervention. Total critical care time was 30 minutes. This excludes any time for separately reportable procedures.        CONSULTS:  None    EMERGENCY DEPARTMENT COURSE and DIFFERENTIAL DIAGNOSIS/MDM:   Vitals:    Vitals:    07/08/22 1445 07/08/22 1530 07/08/22 1617   BP: (!) 202/73 (!) 188/65    Pulse: 82 75 77   Resp: 19 20 21   Temp: 98.2 °F (36.8 °C)     TempSrc: Tympanic     SpO2: 93% 100% 97% Weight: 226 lb (102.5 kg)     Height: 5' 4\" (1.626 m)         Patient was given the following medications:  Medications   methylPREDNISolone sodium (SOLU-MEDROL) injection 125 mg (125 mg IntraVENous Given 7/8/22 1524)   ipratropium-albuterol (DUONEB) nebulizer solution 1 ampule (1 ampule Inhalation Given 7/8/22 1525)   furosemide (LASIX) injection 40 mg (40 mg IntraVENous Given 7/8/22 1638)       For the most part stable. She does feel better after the breathing treatment. Once her diagnostic came back I ordered 40 mg of Lasix. This was administered within 30 minutes. Reportedly near card association class II-III. Is this patient to be included in the SEP-1 Core Measure due to severe sepsis or septic shock? No   Exclusion criteria - the patient is NOT to be included for SEP-1 Core Measure due to: Infection is not suspected    FINAL IMPRESSION      1. Dyspnea and respiratory abnormalities    2. COPD exacerbation ([facility] [address]    3. Acute on chronic diastolic congestive heart failure [facility])          DISPOSITION/PLAN   DISPOSITION Decision To Admit 07/08/2022 04:36:40 PM      PATIENT REFERRED TO:  No follow-up provider specified.     DISCHARGE MEDICATIONS:  New Prescriptions    No medications on file       DISCONTINUED MEDICATIONS:  Discontinued Medications    No medications on file              (Please note that portions of this note were completed with a voice recognition program.  Efforts were made to editthe dictations but occasionally words are mis-transcribed.)    Wendy Carranza MD (electronically signed)            Wendy Carranza MD  07/08/22 2040

## 2022-07-08 NOTE — ED NOTES
Spoke to Trosten Bridges on floor for report for pt to go to 4105. Denies further questions. Pt will be transported in stable condition at 1930.      Annalise Parada RN  07/08/22 0558

## 2022-07-09 LAB
BASOPHILS ABSOLUTE: 0 K/UL (ref 0–0.2)
BASOPHILS RELATIVE PERCENT: 0.1 %
EKG ATRIAL RATE: 78 BPM
EKG DIAGNOSIS: NORMAL
EKG P AXIS: 79 DEGREES
EKG P-R INTERVAL: 194 MS
EKG Q-T INTERVAL: 426 MS
EKG QRS DURATION: 88 MS
EKG QTC CALCULATION (BAZETT): 485 MS
EKG R AXIS: -20 DEGREES
EKG T AXIS: 36 DEGREES
EKG VENTRICULAR RATE: 78 BPM
EOSINOPHILS ABSOLUTE: 0 K/UL (ref 0–0.6)
EOSINOPHILS RELATIVE PERCENT: 0 %
HCT VFR BLD CALC: 34.8 % (ref 36–48)
HEMOGLOBIN: 11.6 G/DL (ref 12–16)
LYMPHOCYTES ABSOLUTE: 0.6 K/UL (ref 1–5.1)
LYMPHOCYTES RELATIVE PERCENT: 5.3 %
MAGNESIUM: 2 MG/DL (ref 1.8–2.4)
MCH RBC QN AUTO: 29.4 PG (ref 26–34)
MCHC RBC AUTO-ENTMCNC: 33.3 G/DL (ref 31–36)
MCV RBC AUTO: 88.3 FL (ref 80–100)
MONOCYTES ABSOLUTE: 0.1 K/UL (ref 0–1.3)
MONOCYTES RELATIVE PERCENT: 1.1 %
NEUTROPHILS ABSOLUTE: 11.1 K/UL (ref 1.7–7.7)
NEUTROPHILS RELATIVE PERCENT: 93.5 %
PDW BLD-RTO: 15.4 % (ref 12.4–15.4)
PLATELET # BLD: 213 K/UL (ref 135–450)
PMV BLD AUTO: 9.1 FL (ref 5–10.5)
RBC # BLD: 3.94 M/UL (ref 4–5.2)
WBC # BLD: 11.8 K/UL (ref 4–11)

## 2022-07-09 PROCEDURE — 6360000002 HC RX W HCPCS: Performed by: HOSPITALIST

## 2022-07-09 PROCEDURE — 6370000000 HC RX 637 (ALT 250 FOR IP): Performed by: HOSPITALIST

## 2022-07-09 PROCEDURE — 94640 AIRWAY INHALATION TREATMENT: CPT

## 2022-07-09 PROCEDURE — 6370000000 HC RX 637 (ALT 250 FOR IP): Performed by: STUDENT IN AN ORGANIZED HEALTH CARE EDUCATION/TRAINING PROGRAM

## 2022-07-09 PROCEDURE — 2700000000 HC OXYGEN THERAPY PER DAY

## 2022-07-09 PROCEDURE — 99222 1ST HOSP IP/OBS MODERATE 55: CPT | Performed by: INTERNAL MEDICINE

## 2022-07-09 PROCEDURE — 85025 COMPLETE CBC W/AUTO DIFF WBC: CPT

## 2022-07-09 PROCEDURE — 2580000003 HC RX 258: Performed by: PEDIATRICS

## 2022-07-09 PROCEDURE — 97162 PT EVAL MOD COMPLEX 30 MIN: CPT

## 2022-07-09 PROCEDURE — 97530 THERAPEUTIC ACTIVITIES: CPT

## 2022-07-09 PROCEDURE — 1200000000 HC SEMI PRIVATE

## 2022-07-09 PROCEDURE — 83735 ASSAY OF MAGNESIUM: CPT

## 2022-07-09 PROCEDURE — 94760 N-INVAS EAR/PLS OXIMETRY 1: CPT

## 2022-07-09 PROCEDURE — 2500000003 HC RX 250 WO HCPCS: Performed by: PEDIATRICS

## 2022-07-09 PROCEDURE — 2580000003 HC RX 258: Performed by: HOSPITALIST

## 2022-07-09 PROCEDURE — 93010 ELECTROCARDIOGRAM REPORT: CPT | Performed by: INTERNAL MEDICINE

## 2022-07-09 PROCEDURE — 6360000002 HC RX W HCPCS: Performed by: INTERNAL MEDICINE

## 2022-07-09 PROCEDURE — 97116 GAIT TRAINING THERAPY: CPT

## 2022-07-09 PROCEDURE — 36415 COLL VENOUS BLD VENIPUNCTURE: CPT

## 2022-07-09 PROCEDURE — 97166 OT EVAL MOD COMPLEX 45 MIN: CPT

## 2022-07-09 RX ORDER — FUROSEMIDE 10 MG/ML
80 INJECTION INTRAMUSCULAR; INTRAVENOUS 2 TIMES DAILY
Status: COMPLETED | OUTPATIENT
Start: 2022-07-09 | End: 2022-07-11

## 2022-07-09 RX ORDER — ASCORBIC ACID 500 MG
500 TABLET ORAL DAILY
Status: ON HOLD | COMMUNITY

## 2022-07-09 RX ORDER — CALCIUM CARBONATE 200(500)MG
500 TABLET,CHEWABLE ORAL 3 TIMES DAILY PRN
Status: DISCONTINUED | OUTPATIENT
Start: 2022-07-09 | End: 2022-07-12 | Stop reason: HOSPADM

## 2022-07-09 RX ADMIN — SODIUM CHLORIDE, PRESERVATIVE FREE 10 ML: 5 INJECTION INTRAVENOUS at 20:53

## 2022-07-09 RX ADMIN — DOXYCYCLINE 100 MG: 100 INJECTION, POWDER, LYOPHILIZED, FOR SOLUTION INTRAVENOUS at 09:52

## 2022-07-09 RX ADMIN — ANTACID TABLETS 500 MG: 500 TABLET, CHEWABLE ORAL at 18:03

## 2022-07-09 RX ADMIN — FUROSEMIDE 40 MG: 10 INJECTION, SOLUTION INTRAMUSCULAR; INTRAVENOUS at 08:05

## 2022-07-09 RX ADMIN — ROSUVASTATIN CALCIUM 20 MG: 20 TABLET, FILM COATED ORAL at 08:04

## 2022-07-09 RX ADMIN — DOFETILIDE 250 MCG: 0.25 CAPSULE ORAL at 08:04

## 2022-07-09 RX ADMIN — IPRATROPIUM BROMIDE AND ALBUTEROL SULFATE 1 AMPULE: .5; 3 SOLUTION RESPIRATORY (INHALATION) at 16:09

## 2022-07-09 RX ADMIN — METHYLPREDNISOLONE SODIUM SUCCINATE 40 MG: 40 INJECTION, POWDER, FOR SOLUTION INTRAMUSCULAR; INTRAVENOUS at 17:07

## 2022-07-09 RX ADMIN — METHYLPREDNISOLONE SODIUM SUCCINATE 40 MG: 40 INJECTION, POWDER, FOR SOLUTION INTRAMUSCULAR; INTRAVENOUS at 08:04

## 2022-07-09 RX ADMIN — LISINOPRIL 20 MG: 20 TABLET ORAL at 08:04

## 2022-07-09 RX ADMIN — IPRATROPIUM BROMIDE AND ALBUTEROL SULFATE 1 AMPULE: .5; 3 SOLUTION RESPIRATORY (INHALATION) at 12:23

## 2022-07-09 RX ADMIN — IPRATROPIUM BROMIDE AND ALBUTEROL SULFATE 1 AMPULE: .5; 3 SOLUTION RESPIRATORY (INHALATION) at 08:04

## 2022-07-09 RX ADMIN — METOPROLOL SUCCINATE 25 MG: 25 TABLET, EXTENDED RELEASE ORAL at 08:04

## 2022-07-09 RX ADMIN — METHYLPREDNISOLONE SODIUM SUCCINATE 40 MG: 40 INJECTION, POWDER, FOR SOLUTION INTRAMUSCULAR; INTRAVENOUS at 02:45

## 2022-07-09 RX ADMIN — METHYLPREDNISOLONE SODIUM SUCCINATE 40 MG: 40 INJECTION, POWDER, FOR SOLUTION INTRAMUSCULAR; INTRAVENOUS at 20:52

## 2022-07-09 RX ADMIN — DOFETILIDE 250 MCG: 0.25 CAPSULE ORAL at 20:52

## 2022-07-09 RX ADMIN — FUROSEMIDE 80 MG: 10 INJECTION, SOLUTION INTRAMUSCULAR; INTRAVENOUS at 17:07

## 2022-07-09 RX ADMIN — RIVAROXABAN 20 MG: 20 TABLET, FILM COATED ORAL at 08:04

## 2022-07-09 RX ADMIN — DOXYCYCLINE 100 MG: 100 INJECTION, POWDER, LYOPHILIZED, FOR SOLUTION INTRAVENOUS at 20:59

## 2022-07-09 RX ADMIN — IPRATROPIUM BROMIDE AND ALBUTEROL SULFATE 1 AMPULE: .5; 3 SOLUTION RESPIRATORY (INHALATION) at 19:38

## 2022-07-09 NOTE — PROGRESS NOTES
Patient admitted to room 0813 from [facility] ED. Oriented to room, call light, and floor policies. Plan of care reviewed with patient, expecting  at bedside overnight will review again if needed. Pt is resting in bed and 0 pain at this time;  s/s of distress r/t SOB noted. Assessment completed; VS collected. Tele in place reading *** rhythm with a rate of ***. Pt rates a *** fall risk; bed alarm deferred OR bed alarm to be placed on the bed/chair. Safety precautions in place; call light and bedside table within reach. Pt encouraged to call for needs or ambulation. Pt VU. Will continue to monitor.

## 2022-07-09 NOTE — PROGRESS NOTES
Hospitalist Progress Note      PCP: Tanya Bailey MD    Date of Admission: 7/8/2022    Chief Complaint: shortness of breath     Hospital Course:   69 y.o. female with PMHx of A-fib, CHF, COPD, HLD and HTN presented to [facility] in transfer from [facility] emergency department for management of COPD and CHF. Patient presented to [facility] emergency department with 1 week history of shortness of breath. She has minimal sputum production. She has been using her inhalers without much improvement. She does use oxygen at night. Admitted with acute on chronic diastolic heart failure exacerbation with an element of COPD exacerbation. Subjective:   Currently on 2 L of oxygen via nasal cannula, being weaned down.   Reported improved symptoms      Medications:  Reviewed    Infusion Medications    sodium chloride 75 mL/hr at 07/08/22 5507    sodium chloride       Scheduled Medications    dofetilide  250 mcg Oral Q12H    lisinopril  20 mg Oral Daily    metoprolol succinate  25 mg Oral Daily    rivaroxaban  20 mg Oral Daily with breakfast    rosuvastatin  20 mg Oral Daily    sodium chloride flush  5-40 mL IntraVENous 2 times per day    ipratropium-albuterol  1 ampule Inhalation Q4H WA    methylPREDNISolone  40 mg IntraVENous Q6H    Followed by   Conrad Seats ON 7/11/2022] predniSONE  40 mg Oral Daily    furosemide  40 mg IntraVENous BID    doxycycline (VIBRAMYCIN) IV  100 mg IntraVENous Q12H     PRN Meds: albuterol, albuterol sulfate HFA, nitroGLYCERIN, sodium chloride flush, sodium chloride, polyethylene glycol, acetaminophen **OR** acetaminophen      Intake/Output Summary (Last 24 hours) at 7/9/2022 0826  Last data filed at 7/9/2022 0520  Gross per 24 hour   Intake 1307 ml   Output 3025 ml   Net -1718 ml       Physical Exam Performed:    BP (!) 191/73   Pulse 73   Temp 98 °F (36.7 °C) (Oral)   Resp 18   Ht 5' 4\" (1.626 m)   Wt 223 lb 1.7 oz (101.2 kg)   SpO2 97%   BMI 38.30 kg/m²     General appearance: No apparent distress, appears stated age and cooperative. HEENT: Pupils equal, round, and reactive to light. Conjunctivae/corneas clear. Neck: Supple, with full range of motion. No jugular venous distention. Trachea midline. Respiratory: Very faint bilateral wheeze   cardiovascular: Regular rate and rhythm with normal S1/S2 without murmurs, rubs or gallops. Abdomen: Soft, non-tender, non-distended with normal bowel sounds. Musculoskeletal: No clubbing, cyanosis or edema bilaterally. Full range of motion without deformity. Skin: Skin color, texture, turgor normal.  No rashes or lesions. Neurologic:  Neurovascularly intact without any focal sensory/motor deficits. Cranial nerves: II-XII intact, grossly non-focal.  Psychiatric: Alert and oriented, thought content appropriate, normal insight  Capillary Refill: Brisk,3 seconds, normal   Peripheral Pulses: +2 palpable, equal bilaterally       Labs:   Recent Labs     07/08/22  1516 07/08/22  2130 07/09/22  0600   WBC 10.5 9.0 11.8*   HGB 11.0* 11.9* 11.6*   HCT 35.4* 36.0 34.8*    201 213     Recent Labs     07/08/22  1516      K 3.9      CO2 29   BUN 22*   CREATININE 1.1   CALCIUM 9.4     Recent Labs     07/08/22  1516   AST 18   ALT 14   BILITOT 0.3   ALKPHOS 77     No results for input(s): INR in the last 72 hours. Recent Labs     07/08/22  1516   TROPONINI 0.04*       Urinalysis:      Lab Results   Component Value Date/Time    NITRU Negative 09/09/2021 11:52 AM    WBCUA 1 09/09/2021 11:52 AM    BACTERIA 1+ 09/24/2020 06:30 PM    RBCUA 1 09/09/2021 11:52 AM    BLOODU TRACE 09/09/2021 11:52 AM    SPECGRAV 1.010 09/09/2021 11:52 AM    GLUCOSEU Negative 09/09/2021 11:52 AM       Radiology:  XR CHEST PORTABLE   Final Result   Increased lung markings bilaterally, may be related to mild pulmonary   vascular congestion versus bronchitis. Stable mild cardiomegaly.                  Assessment/Plan:    Active Hospital Problems Diagnosis     SOB (shortness of breath) [R06.02]      Priority: Medium     Shortness of breath. For COPD exacerbation. Acute on chronic diastolic heart failure  last ECHO: 9/2021 EF 09% Grade 2 Diastolic Dysfunction, patient presented to emergency with shortness of breath that is likely related to both COPD exacerbation and diastolic heart failure. Patient only uses 2 L oxygen at nighttime  Plan.   -Continue prednisone  -Appreciate cardiology input  -Keep on Lasix 80 mg IV twice daily  -Strict intake output  -Nebulizations  -Complete 5 days of doxycycline     Atrial Fibrillation   - currently rate controlled  - continue Xarelto, Dofetilide and metoprolol      Essential (primary) hypertension   - monitor blood pressure  - continue home meds      Hyperlipidemia   - continue statin    DVT Prophylaxis: On Xarelto  Diet: ADULT DIET; Regular  Code Status: Full Code    PT/OT Eval Status:  Following    Dispo -pending clinical improvement    Compa Petit MD

## 2022-07-09 NOTE — PLAN OF CARE
Problem: Discharge Planning  Goal: Discharge to home or other facility with appropriate resources  7/9/2022 1117 by Zeina Mcmullen RN  Outcome: Progressing  7/8/2022 2321 by Ivan Styles RN  Outcome: Progressing     Problem: Skin/Tissue Integrity  Goal: Absence of new skin breakdown  Description: 1. Monitor for areas of redness and/or skin breakdown  2. Assess vascular access sites hourly  3. Every 4-6 hours minimum:  Change oxygen saturation probe site  4. Every 4-6 hours:  If on nasal continuous positive airway pressure, respiratory therapy assess nares and determine need for appliance change or resting period.   7/9/2022 1117 by Zeina Mcmullen RN  Outcome: Progressing  7/8/2022 2321 by Ivan Styles RN  Outcome: Progressing     Problem: Safety - Adult  Goal: Free from fall injury  7/9/2022 1117 by Zeina Mcmullen RN  Outcome: Progressing  7/8/2022 2321 by Ivan Styles RN  Outcome: Progressing

## 2022-07-09 NOTE — PROGRESS NOTES
Physician Progress Note      Heladio Camacho  CSN #:                  [id number]  :                       1945  ADMIT DATE:       2022 2:52 PM  [address] DATE:  RESPONDING  PROVIDER #:        Norma Alan          QUERY TEXT:    Pt admitted with COPD exacerbation  and has CHF documented. If possible,   please document in progress notes and discharge summary further specificity   regarding the type and acuity of CHF:    The medical record reflects the following:  Risk Factors: HTN Diastolic HF Afib  Clinical Indicators: ECHO  \"EF is  60%. Diastolic  filling parameters   suggest grade II diastolic dysfunction. The left atrium is severely dilated. \" per ED Provider \"Acute on chronic   diastolic HF\" pro bnp 8,765  CXR \"Increased lung markings bilaterally, may be related to mild pulmonary   vascular congestion versus bronchitis. \"  Treatment: In ED 40mg IV Lasix and Lasix 40mg IV BID , Lopressor daily and   telemetry monitoring  Options provided:  -- Acute on Chronic Diastolic CHF/HFpEF  -- Chronic Diastolic CHF/HFpEF  -- Other - I will add my own diagnosis  -- Disagree - Not applicable / Not valid  -- Disagree - Clinically unable to determine / Unknown  -- Refer to Clinical Documentation Reviewer    PROVIDER RESPONSE TEXT:    This patient is in acute on chronic diastolic CHF/HFpEF.     Query created by: Hi Jack on 2022 11:10 PM      Electronically signed by:  Norma Alan 2022 3:16 PM

## 2022-07-09 NOTE — CONSULTS
Cardiology Consultation   Date: 7/9/2022  Admit Date:  7/8/2022  Reason for Consultation: dyspnea  Consult Requesting Physician: Graham Brenner MD     Chief Complaint   Patient presents with    Shortness of Breath     x1 week. mild cough. +COPD     HPI: Conner Burnette is a 69 y.o. F h/o persistent Afib s/p Afib ablation in the recent past, HTN, chronic diastolic CHF who presents to [facility] ED with 1 week history of dyspnea refractory to nebulizer usage. Upon workup in [address] [facility] ED, noted to have BNP 2,100+, Evette 0.04, Cr 1.1. CXR shows increased interstitial markings. EKG shows SR 78bpm, QTc 485ms, LVH, old septal MI. Admitted for acute diastolic CHF. Past Medical History:   Diagnosis Date    AAA (abdominal aortic aneurysm) ([facility]     pt states it is 4cm    AAA (abdominal aortic aneurysm) without rupture (HCC) 2/10/2015    Atrial fibrillation (HCC)     CAD (coronary artery disease)     CHF (congestive heart failure) ([facility]     COPD [facility]chronic obstructive pulmonary disease) ([facility]     History of blood clots     Hyperlipidemia     Hypertension         Past Surgical History:   Procedure Laterality Date    ABDOMINAL AORTIC ANEURYSM REPAIR      Endovascular abdominal AA    APPENDECTOMY  1990    incidental    BLADDER SUSPENSION      CARDIAC SURGERY      CATARACT REMOVAL      CHOLECYSTECTOMY  10/15/13    COLONOSCOPY  9/2/07    dr Samuel Pierre and check in 5 years.  COLONOSCOPY  06/16/2017    ok dr arndt, repeat 5 years    HYSTERECTOMY ([facility])  1990    for benign tumor. just the uterus    JOINT REPLACEMENT  12/2013    right knee replacement    TONSILLECTOMY  as a child    TUMOR EXCISION      benign behind right ear about 2008       Allergies   Allergen Reactions    Morphine Rash     rash       Social History:  Reviewed. reports that she quit smoking about 8 years ago. Her smoking use included cigarettes. She started smoking about 45 years ago. She has a 37.00 pack-year smoking history. She has never used smokeless tobacco. She reports that she does not drink alcohol and does not use drugs. Family History:  Reviewed. family history includes Heart Disease in her father; Hypertension in an other family member. No premature CAD. Review of System:  All other systems reviewed except for that noted above. Pertinent negatives and positives are:     · General: negative for fever, chills   · Ophthalmic ROS: negative for - eye pain or loss of vision  · ENT ROS: negative for - headaches, sore throat   · Respiratory: negative for - cough, sputum  · Cardiovascular: Reviewed in HPI  · Gastrointestinal: negative for - abdominal pain, diarrhea, N/V  · Hematology: negative for - bleeding, blood clots, bruising or jaundice  · Genito-Urinary:  negative for - Dysuria or incontinence  · Musculoskeletal: negative for - Joint swelling, muscle pain  · Neurological: negative for - confusion, dizziness, headaches   · Psychiatric: No anxiety, no depression. · Dermatological: negative for - rash    Physical Examination:  Vitals:    22 0808   BP:    Pulse: 73   Resp: 18   Temp:    SpO2: 97%        Intake/Output Summary (Last 24 hours) at 2022 0959  Last data filed at 2022 0520  Gross per 24 hour   Intake 1307 ml   Output 3025 ml   Net -1718 ml     In: 8448 [P.O.:1297; I.V.:10]  Out: 3025    Wt Readings from Last 3 Encounters:   22 223 lb 1.7 oz (101.2 kg)   22 226 lb (102.5 kg)   22 221 lb (100.2 kg)     Temp  Av °F (36.7 °C)  Min: 97.3 °F (36.3 °C)  Max: 98.4 °F (36.9 °C)  Pulse  Av.2  Min: 66  Max: 125  BP  Min: 158/103  Max: 237/147  SpO2  Av.6 %  Min: 93 %  Max: 100 %    · Telemetry: Sinus rhythm with v-rates 70's bpm  · Constitutional: Alert. Oriented to person, place, and time. No distress. · Head: Normocephalic and atraumatic. · Mouth/Throat: Lips appear moist. Oropharynx is clear and moist.  · Eyes: Conjunctivae normal. EOM are normal.   · Neck: Neck supple. No lymphadenopathy. No rigidity. 15cm JVD present. · Cardiovascular: Normal rate, regular rhythm. Normal S1&S2. Carotid pulse 2+ bilaterally. · Pulmonary/Chest: Bilateral respiratory sounds present. No respiratory accessory muscle use. No wheezes, No rhonchi. bibasilar rales. · Abdominal: Soft. Normal bowel sounds present. No distension, No tenderness. No splenomegaly. No hernia. · Musculoskeletal: No tenderness. Full range of motion in bilateral upper and lower extremities. 1+ pitting BLE edema    · Lymphadenopathy: Has no cervical adenopathy. · Neurological: Alert and oriented. Cranial nerve II-XII grossly intact, No gross deficit to touch. · Skin: Skin is warm and dry. No rash, lesions, ulcerations noted. · Psychiatric: No anxiety nor agitation. Labs:  Reviewed. Recent Labs     07/08/22  1516      K 3.9      CO2 29   BUN 22*   CREATININE 1.1     Recent Labs     07/08/22  1516 07/08/22  2130 07/09/22  0600   WBC 10.5 9.0 11.8*   HGB 11.0* 11.9* 11.6*   HCT 35.4* 36.0 34.8*   MCV 93.4 88.8 88.3    201 213     Lab Results   Component Value Date/Time    TROPONINI 0.04 07/08/2022 03:16 PM     No results found for: BNP  Lab Results   Component Value Date/Time    PROTIME 26.0 02/15/2020 06:28 PM    PROTIME 11.3 03/21/2018 06:40 AM    PROTIME 11.7 02/24/2018 11:44 AM    INR 2.22 02/15/2020 06:28 PM    INR 1.00 03/21/2018 06:40 AM    INR 1.04 02/24/2018 11:44 AM     Lab Results   Component Value Date/Time    CHOL 107 09/10/2021 07:22 AM    HDL 56 09/10/2021 07:22 AM    HDL 38 09/09/2011 03:35 PM    TRIG 59 09/10/2021 07:22 AM       Diagnostic and imaging results reviewed. ECG: SR 78 bpm, RBBB, QTc 485ms, LVH, septal MI  Echo: 2021  Summary   Mod conc. LVH with normal LV size & wall motion. EF is    60%. Diastolic   filling parameters suggest grade II diastolic dysfunction. The left atrium is severely dilated. Normal right ventricular size and function.    Trivial mitral, aortic and tricuspid regurgitation  Cath: n/a    I independently reviewed and interpreted the ECG, telemetry, serology, echocardiographic results.     Scheduled Meds:   dofetilide  250 mcg Oral Q12H    lisinopril  20 mg Oral Daily    metoprolol succinate  25 mg Oral Daily    rivaroxaban  20 mg Oral Daily with breakfast    rosuvastatin  20 mg Oral Daily    sodium chloride flush  5-40 mL IntraVENous 2 times per day    ipratropium-albuterol  1 ampule Inhalation Q4H WA    methylPREDNISolone  40 mg IntraVENous Q6H    Followed by   George Shen ON 7/11/2022] predniSONE  40 mg Oral Daily    furosemide  40 mg IntraVENous BID    doxycycline (VIBRAMYCIN) IV  100 mg IntraVENous Q12H     Continuous Infusions:   sodium chloride       PRN Meds:.albuterol, albuterol sulfate HFA, nitroGLYCERIN, sodium chloride flush, sodium chloride, polyethylene glycol, acetaminophen **OR** acetaminophen     Assessment:   Patient Active Problem List    Diagnosis Date Noted    PVD (peripheral vascular disease) ([facility]     [facility] 02/10/2015    History of DVT (deep vein thrombosis) 05/08/2013    Family history of DVT 05/08/2013    Coronary artery disease 09/09/2011    Sleep apnea 09/09/2011    SOB (shortness of breath) 07/08/2022    Atrial fibrillation ([facility] 05/24/2022    Primary hypertension 09/09/2011    Hyperlipidemia 09/09/2011    Obesity (BMI 30-39.9) 01/18/2022    A-fib ([facility] 02/17/2021    Left atrial flutter by electrocardiogram (HCC)     Persistent atrial fibrillation (HCC)     Hypertensive crisis 09/24/2020    Acute on chronic congestive heart failure ([address] 09/24/2020    Respiratory failure ([address] 09/24/2020    COPD, severe ([address]     Atherosclerotic heart disease of native coronary artery with other forms of angina pectoris ([address] 01/03/2020    Morbidly obese ([facility] 01/03/2020    History of repair of aneurysm of abdominal aorta using endovascular stent graft 08/16/2019    Carotid artery stenosis without cerebral infarction, bilateral 03/15/2019    [place] post (EVAR) endovascular aneurysm repair, sequela 06/26/2018    PAF ([facility]oxysmal atrial fibrillation) [facility]     AAA (abdominal aortic aneurysm) ([facility] 03/21/2018    Abnormal nuclear stress test     Atypical pneumonia     Atrial fibrillation with RVR (HCC)     Chronic diastolic heart failure (HCC)     Other emphysema (HCC)     Acute bronchitis     Acute respiratory failure with hypoxia (HCC)     Abnormal chest x-ray     Vitamin D deficiency 11/07/2017    Pelvic pain in female 10/13/2017    Pleural effusion, left 06/29/2015    Pleural effusion     COPD exacerbation [facility])       Active Hospital Problems    Diagnosis Date Noted    SOB (shortness of breath) [R06.02] 07/08/2022     Priority: Medium         Recommendation(s):    Acute diastolic CHF  -hypervolemic. Will order Lasix 80 mg IV BID. Strict I/O, daily wts, electrolyte repletion prn  -CHF nurse consultation for low-salt diet and outpatient CHF management    Persistent atrial fibrillation  -currently in SR  -s/p several atrial fibrillation/L flutter ablations in the recent past, the most recent being 2/4/22.  -continue Xarelto  -cont Toprol XL    Evette increase  -very slightly elevated  -most likely due to the stress of CHF  -no need for ischemic workup at this time. HTN  -cont lisinopril    Will follow with you from general cardiology perspective. Thank you for allowing me to participate in the care of Doe Oquendo . If you have any questions/comments, please do not hesitate to contact us.       France Coleman MD, MS, [facility], [address]  Cardiac Electrophysiology  [address]  [address] [facility], [address] [place] [address]  Jon Shah [address]  [phone]

## 2022-07-09 NOTE — PROGRESS NOTES
Physical Therapy  Facility/Department: [facility]  Physical Therapy Initial Assessment    Name: Jimmie Kruger  : 1945  MRN: [id number]  Date of Service: 2022  Jimmie Kruger scored a 20/24 on the AM-PAC short mobility form. Current research shows that an AM-PAC score of 18 or greater is typically associated with a discharge to the patient's home setting. Based on the patient's AM-PAC score and their current functional mobility deficits, it is recommended that the patient have 2-3 sessions per week of Physical Therapy at d/c to increase the patient's independence. At this time, this patient demonstrates the endurance and safety to discharge home with home services and a follow up treatment frequency of 2-3x/wk. Please see assessment section for further patient specific details. If patient discharges prior to next session this note will serve as a discharge summary. Please see below for the latest assessment towards goals. Discharge Recommendations:  Home with Home health PT,Home with assist PRN   PT Equipment Recommendations  Equipment Needed: Yes  Mobility Devices: Annika George: Rollator (4 Wheeled)  Other: Pt would benefit from a rollator walker for community ambulation as it would increase her safety and allow her to walk farther distances if she could sit and rest on the rolltor seat when out      Patient Diagnosis(es): The primary encounter diagnosis was Dyspnea and respiratory abnormalities. Diagnoses of COPD exacerbation ([address] and Acute on chronic diastolic congestive heart failure ([address] were also pertinent to this visit. Past Medical History:  has a past medical history of AAA (abdominal aortic aneurysm) ([address] AAA (abdominal aortic aneurysm) without rupture (HCC), Atrial fibrillation ([address] CAD (coronary artery disease), CHF (congestive heart failure) ([address] COPD (chronic obstructive pulmonary disease) ([address] History of blood clots, Hyperlipidemia, and Hypertension.   Past Surgical History:  has a past surgical history that includes Colonoscopy (9/2/07); Hysterectomy (1990); Appendectomy (1990); bladder suspension; Cataract removal; Tonsillectomy (as a child); tumor excision; Cholecystectomy (10/15/13); Colonoscopy (06/16/2017); joint replacement (12/2013); Abdominal aortic aneurysm repair; and Cardiac surgery. Assessment   Body Structures, Functions, Activity Limitations Requiring Skilled Therapeutic Intervention: Decreased functional mobility ; Decreased endurance;Decreased balance  Assessment: Pt presents today mildly below prior level of I with self care without an AD in home,  occasionally helps with socks/shoes and  does housework. Today pt was SBA for bed mobility and transfers and CGA for ambulation in room, SOB with O2 sat dropping from 95% to 90% with househole walking, increased lateral sway with decreased balance as pt fatigues. Pt would benefit from a rollator walker for community ambulation as it would increase her safety and allow her to walk farther distances if she could sit and rest on the rolltor seat when out. Pt is safe to go home with family assist and home PT.   Treatment Diagnosis: decerased functional mobility following exacerbation CHF  Therapy Prognosis: Good  History: see note  Exam: see note  Clinical Presentation: evolving  Barriers to Learning: none noted  Requires PT Follow-Up: Yes  Activity Tolerance  Activity Tolerance: Patient tolerated evaluation without incident     Plan   Plan  Plan: 2-3 times per week  Plan weeks: for acute stay  Current Treatment Recommendations: Balance training,Gait training,Endurance training  Safety Devices  Type of Devices: Call light within reach,Chair alarm in place,Gait belt,Left in chair,Nurse notified     Restrictions  Restrictions/Precautions  Restrictions/Precautions: Fall Risk     Subjective   General  Chart Reviewed: Yes  Patient assessed for rehabilitation services?: Yes  Additional Pertinent Hx: Pt admitted after several days of worsening SOB, on room aot but SOB with activity  Response To Previous Treatment: Not applicable  Family / Caregiver Present: Yes ()  Referring Practitioner: Alexei  Referral Date : 07/09/22  Diagnosis: SOB, worsening CHF  Follows Commands: Within Functional Limits  Subjective  Subjective: denies pain         Social/Functional History  Social/Functional History  Lives With: Spouse  Type of Home: Mobile home  Home Layout: One level  Home Access: Stairs to enter with rails  Entrance Stairs - Number of Steps: 1  Bathroom Shower/Tub:  (walk in tub with small step)  Bathroom Equipment: Built-in shower seat,Grab bars in shower  Home Equipment: giovanni Guadarrama (pt would like a rollator with a seat)  Has the patient had two or more falls in the past year or any fall with injury in the past year?: No  ADL Assistance: Independent (pt can do I but  sometimes helps with socks/shoes)  Homemaking Assistance: Needs assistance  Homemaking Responsibilities: No  Ambulation Assistance: Independent  Transfer Assistance: Independent  Active : Yes  Occupation: Retired  Type of Occupation: [employer]  Leisure & Hobbies: walking, [employer] on wednesday  [address]: Within Functional Limits  Hearing  Hearing: Within functional limits    Cognition         Objective     AROM RLE (degrees)  RLE AROM: WFL  AROM LLE (degrees)  LLE AROM : WFL  Strength RLE  Strength RLE: WFL  Strength LLE  Strength LLE: WFL        Balance  Sitting:  (SUP at EOB)  Standing:  (SBA PRN tx)  Gait  Overall Level of Assistance: Stand-by assistance (EOB>within room ~40 feet>couch>recliner with no AD. Mild unsteadiness, no LOB.  SOB noted quckily, requiring rest break)  Bed mobility  Supine to Sit: Stand by assistance  Sit to Supine: Unable to assess  Scooting: Stand by assistance  Transfers  Sit to Stand: Stand by assistance  Stand to sit: Stand by assistance  Ambulation  Surface: level tile  Device: No Device  Assistance: Contact guard assistance;Stand by assistance  Quality of Gait: wide BRAULIO with increased lateral sway, increased SOB  Gait Deviations: Slow Sandie; Increased BRAULIO  Distance: 48' in room  Comments: SOB with ambulation, O2 sat = 90% after walking 50', balance adequate for room ambulation but decreased with longer distances, recommend wh wlaker for outdoors  More Ambulation?: No     Balance  Posture: Fair  Sitting - Static: Good  Sitting - Dynamic: Good  Standing - Static: Good  Standing - Dynamic: Good;-  Comments: increased SOB with distance   AM-PAC Score  AM-PAC Inpatient Mobility Raw Score : 20 (07/09/22 1355)  AM-PAC Inpatient T-Scale Score : 47.67 (07/09/22 1355)  Mobility Inpatient CMS 0-100% Score: 35.83 (07/09/22 1355)  Mobility Inpatient CMS G-Code Modifier : CJ (07/09/22 1355)   Goals  Short Term Goals  Time Frame for Short term goals: by acute discharge  Short term goal 1: all trasnfers modif I  Short term goal 2: amubulation with rolator x 100' with O2 sat >90%  Patient Goals   Patient goals : erturn home, would like a rollator for longer distances and outdoor ambulation     Therapy Time   Individual Concurrent Group Co-treatment   Time In 1315         Time Out 1355         Minutes 40         Timed Code Treatment Minutes: 45 Minutes   charges - 15 min eval 15 min gt 20 min theract  Linda Rosales, [address]

## 2022-07-09 NOTE — PROGRESS NOTES
Occupational Therapy  Facility/Department: [address] [place] [address]  Occupational Therapy Initial Assessment    Name: Lisa Mcginnis  : 1945  MRN: [id number]  Date of Service: 2022    Discharge Recommendations:  24 hour supervision or assist        Lisa Mcginnis scored a 20/24 on the AM[facility] ADL Inpatient form. At this time, no further OT is recommended upon discharge due to spouse able to assist. Recommend patient returns to prior setting with prior services. Patient Diagnosis(es): The primary encounter diagnosis was Dyspnea and respiratory abnormalities. Diagnoses of COPD exacerbation ([facility] and Acute on chronic diastolic congestive heart failure ([facility] were also pertinent to this visit. Past Medical History:  has a past medical history of AAA (abdominal aortic aneurysm) ([facility] AAA (abdominal aortic aneurysm) without rupture (HCC), Atrial fibrillation ([facility] CAD (coronary artery disease), CHF (congestive heart failure) ([facility] COPD (chronic obstructive pulmonary disease) ([facility] History of blood clots, Hyperlipidemia, and Hypertension. Past Surgical History:  has a past surgical history that includes Colonoscopy (07); Hysterectomy (); Appendectomy (); bladder suspension; Cataract removal; Tonsillectomy (as a child); tumor excision; Cholecystectomy (10/15/13); Colonoscopy (2017); joint replacement (2013); Abdominal aortic aneurysm repair; and Cardiac surgery. Treatment Diagnosis: impaired ADL/fxl mobility    Assessment   Performance deficits / Impairments: Decreased functional mobility ; Decreased endurance;Decreased high-level IADLs;Decreased balance;Decreased ADL status  Assessment: 70 yo female admitted for SOB with COPD exacerbation. PMH: CHF, COPD, CAD, AAA, Afib. PTA, pt lives with spouse with assist for IADL and LB dressing PRN, otherwise IND no AD. Today, pt functioning below baseline most limited by decreased endurance.  Pt with SOB after minimal activity, and worsening wtih further activity, however no desat. Required seated rest break for deep breathing. Pt SBA bed mobility, fxl tx and mobility no AD within room with little to no unsteadiness. Pt declines ADLs, anticipate Mod/Min A LB and set up UB ADL based on balance, endurance, cognition, and PLOF. Pt educated on continued use of bench for energy conservation with bathing. Cont acute OT to address above deficits. Anticipate safe return home with spouse for 24 hr SUP  Treatment Diagnosis: impaired ADL/fxl mobility  Prognosis: Good;Fair  Decision Making: Medium Complexity  REQUIRES OT FOLLOW-UP: Yes  Activity Tolerance  Activity Tolerance: Patient limited by fatigue  Activity Tolerance Comments: SOB noted has short fxl mobility and worsens with further activity, requiring seated deep breathing for improvement. SpO2 down to 90%, up to 96% with deep breathing        Plan   Plan  Times per Week: 3-5  Specific Instructions for Next Treatment: energy conservation  Current Treatment Recommendations: Strengthening,ROM,Balance training,Functional mobility training,Endurance training,Pain management,Safety education & training,Patient/Caregiver education & training,Self-Care / ADL,Home management training     Restrictions  Restrictions/Precautions  Restrictions/Precautions: Fall Risk    Subjective   General  Chart Reviewed: Yes  Patient assessed for rehabilitation services?: Yes  Additional Pertinent Hx: 70 yo female admitted for SOB with COPD exacerbation. PMH: CHF, COPD, CAD, AAA, Afib  Family / Caregiver Present: Yes (spouse)  Referring Practitioner: Mary Juarez MD  Diagnosis: COPD exacerbation  Subjective  Subjective: Pt resting in bed upon arrival and agreeable to OT/PT eval. Pt reporting no pain.  Pt with SOB throughout with activity  General Comment  Comments: RN ok to see     Social/Functional History  Social/Functional History  Lives With: Spouse  Type of Home: Mobile home  Home Layout: One level  Home Access: Stairs to enter with rails  Entrance Stairs - Number of Steps: 1  Bathroom Shower/Tub:  (walk in tub with small step)  Bathroom Equipment: Built-in shower seat,Grab bars in shower  Home Equipment: Walker, rolling (pt would like a rollator with a seat)  Has the patient had two or more falls in the past year or any fall with injury in the past year?: No  ADL Assistance: Independent (pt can do I but  sometimes helps with socks/shoes)  Homemaking Assistance: Needs assistance  Homemaking Responsibilities: No  Ambulation Assistance: Independent  Transfer Assistance: Independent  Active : Yes  Occupation: Retired  Type of Occupation: [employer]  Leisure & Hobbies: walking, karioke on wednesday       Objective              Safety Devices  Type of Devices: Call light within reach; Chair alarm in place;Gait belt;Left in chair;Nurse notified  Balance  Sitting:  (SUP at EOB)  Standing:  (SBA PRN tx)  Gait  Overall Level of Assistance: Stand-by assistance (EOB>within room ~40 feet>couch>recliner with no AD. Mild unsteadiness, no LOB. SOB noted quckily, requiring rest break)     AROM: Within functional limits  PROM: Within functional limits  Strength: Within functional limits  Coordination: Within functional limits  Tone: Normal  ADL  Additional Comments: Pt declines ADLs, anticipate Mod/Min A LB and set up UB ADL based on balance, endurance, cognition, and PLOF     Activity Tolerance  Activity Tolerance: Patient tolerated evaluation without incident  Bed mobility  Supine to Sit: Stand by assistance  Sit to Supine: Unable to assess  Scooting: Stand by assistance  Transfers  Sit to stand: Stand by assistance  Stand to sit: Stand by assistance  Transfer Comments: no AD     Cognition  Overall Cognitive Status: WFL  Orientation  Overall Orientation Status: Within Functional Limits                  Education Given To: Patient; Family  Education Provided: Role of Therapy;Plan of Care;Transfer Training  Education Provided Comments: continued seated bathing.  Importance of OOB activity 3x/day specifically related to lung function  Education Method: Verbal  Education Outcome: Verbalized understanding                      AM-PAC Score        AM-PAC Inpatient Daily Activity Raw Score: 20 (07/09/22 1357)  AM-PAC Inpatient ADL T-Scale Score : 42.03 (07/09/22 1357)  ADL Inpatient CMS 0-100% Score: 38.32 (07/09/22 1357)  ADL Inpatient CMS G-Code Modifier : CJ (07/09/22 1357)    Goals  Short Term Goals  Time Frame for Short term goals: prior to d/c  Short Term Goal 1: toileting SUP  Short Term Goal 2: LB dressing wtih AE as needed SUP  Short Term Goal 3: UB bathing/dressing SUP  Short Term Goal 4: tolerate 3 min fxl standing task SUP  Short Term Goal 5: fxl tx and mobility with AE PRN with SUP  Patient Goals   Patient goals : being out with friends for karaoke       Therapy Time   Individual Concurrent Group Co-treatment   Time In 1315         Time Out 1355         Minutes 40         Timed Code Treatment Minutes: 25 Minutes (15 eval. 25 TA)       YUDELKA Brown, OTR/L

## 2022-07-10 LAB — MAGNESIUM: 1.8 MG/DL (ref 1.8–2.4)

## 2022-07-10 PROCEDURE — 2500000003 HC RX 250 WO HCPCS: Performed by: PEDIATRICS

## 2022-07-10 PROCEDURE — 6370000000 HC RX 637 (ALT 250 FOR IP): Performed by: HOSPITALIST

## 2022-07-10 PROCEDURE — 6360000002 HC RX W HCPCS: Performed by: INTERNAL MEDICINE

## 2022-07-10 PROCEDURE — 2700000000 HC OXYGEN THERAPY PER DAY

## 2022-07-10 PROCEDURE — 94761 N-INVAS EAR/PLS OXIMETRY MLT: CPT

## 2022-07-10 PROCEDURE — 94640 AIRWAY INHALATION TREATMENT: CPT

## 2022-07-10 PROCEDURE — 6360000002 HC RX W HCPCS: Performed by: HOSPITALIST

## 2022-07-10 PROCEDURE — 36415 COLL VENOUS BLD VENIPUNCTURE: CPT

## 2022-07-10 PROCEDURE — 83735 ASSAY OF MAGNESIUM: CPT

## 2022-07-10 PROCEDURE — 2580000003 HC RX 258: Performed by: HOSPITALIST

## 2022-07-10 PROCEDURE — 2580000003 HC RX 258: Performed by: PEDIATRICS

## 2022-07-10 PROCEDURE — 99232 SBSQ HOSP IP/OBS MODERATE 35: CPT | Performed by: INTERNAL MEDICINE

## 2022-07-10 PROCEDURE — 1200000000 HC SEMI PRIVATE

## 2022-07-10 RX ADMIN — SODIUM CHLORIDE, PRESERVATIVE FREE 10 ML: 5 INJECTION INTRAVENOUS at 08:55

## 2022-07-10 RX ADMIN — FUROSEMIDE 80 MG: 10 INJECTION, SOLUTION INTRAMUSCULAR; INTRAVENOUS at 16:36

## 2022-07-10 RX ADMIN — IPRATROPIUM BROMIDE AND ALBUTEROL SULFATE 1 AMPULE: .5; 3 SOLUTION RESPIRATORY (INHALATION) at 16:55

## 2022-07-10 RX ADMIN — METHYLPREDNISOLONE SODIUM SUCCINATE 40 MG: 40 INJECTION, POWDER, FOR SOLUTION INTRAMUSCULAR; INTRAVENOUS at 16:36

## 2022-07-10 RX ADMIN — LISINOPRIL 20 MG: 20 TABLET ORAL at 08:55

## 2022-07-10 RX ADMIN — IPRATROPIUM BROMIDE AND ALBUTEROL SULFATE 1 AMPULE: .5; 3 SOLUTION RESPIRATORY (INHALATION) at 07:39

## 2022-07-10 RX ADMIN — IPRATROPIUM BROMIDE AND ALBUTEROL SULFATE 1 AMPULE: .5; 3 SOLUTION RESPIRATORY (INHALATION) at 11:44

## 2022-07-10 RX ADMIN — ROSUVASTATIN CALCIUM 20 MG: 20 TABLET, FILM COATED ORAL at 08:54

## 2022-07-10 RX ADMIN — DOXYCYCLINE 100 MG: 100 INJECTION, POWDER, LYOPHILIZED, FOR SOLUTION INTRAVENOUS at 11:43

## 2022-07-10 RX ADMIN — METOPROLOL SUCCINATE 25 MG: 25 TABLET, EXTENDED RELEASE ORAL at 08:55

## 2022-07-10 RX ADMIN — DOFETILIDE 250 MCG: 0.25 CAPSULE ORAL at 21:00

## 2022-07-10 RX ADMIN — FUROSEMIDE 80 MG: 10 INJECTION, SOLUTION INTRAMUSCULAR; INTRAVENOUS at 08:55

## 2022-07-10 RX ADMIN — DOFETILIDE 250 MCG: 0.25 CAPSULE ORAL at 08:55

## 2022-07-10 RX ADMIN — IPRATROPIUM BROMIDE AND ALBUTEROL SULFATE 1 AMPULE: .5; 3 SOLUTION RESPIRATORY (INHALATION) at 20:28

## 2022-07-10 RX ADMIN — RIVAROXABAN 20 MG: 20 TABLET, FILM COATED ORAL at 08:54

## 2022-07-10 RX ADMIN — DOXYCYCLINE 100 MG: 100 INJECTION, POWDER, LYOPHILIZED, FOR SOLUTION INTRAVENOUS at 21:04

## 2022-07-10 RX ADMIN — METHYLPREDNISOLONE SODIUM SUCCINATE 40 MG: 40 INJECTION, POWDER, FOR SOLUTION INTRAMUSCULAR; INTRAVENOUS at 02:48

## 2022-07-10 RX ADMIN — METHYLPREDNISOLONE SODIUM SUCCINATE 40 MG: 40 INJECTION, POWDER, FOR SOLUTION INTRAMUSCULAR; INTRAVENOUS at 08:55

## 2022-07-10 NOTE — PROGRESS NOTES
This nurse was preparing to start pt doxycycline when crystallization was noticed in the IV tubing. All tubing removed and discarded. Call placed to pharmacy, pharmacy stated that the medication was compatible with fluids running through tubing. Pharmacy recommended starting all new IV tubing. Dr. Nahed Robertson and nursing supervisor notified of event. All IV tubing replaced for administration of doxycycline. This nurse to monitor tubing set to observe for signs of precipitate during administration.     Electronically signed by Nataly Juares RN on 7/10/22 at 9:36 AM EDT

## 2022-07-10 NOTE — PROGRESS NOTES
Cardiology Progress Note     Admit Date: 7/8/2022     Reason for follow up: dyspnea with exertion    HPI:   Luke Frias is a 69 y.o. F h/o persistent Afib s/p Afib ablation in the recent past, HTN, chronic diastolic CHF who presents to [facility] ED with 1 week history of dyspnea refractory to nebulizer usage. Upon workup in [address] [facility] ED, noted to have BNP 2,100+, Evette 0.04, Cr 1.1. CXR shows increased interstitial markings. EKG shows SR 78bpm, QTc 485ms, LVH, old septal MI. Admitted for acute diastolic CHF. Interval History: Patient seen and examined. Clinical notes reviewed. Telemetry reviewed - SR 60's. 7/9/2022 @ 2100 through 0000 many atrial runs in the midst of sinus. No new complaint today. No major events overnight. Denies having angina, shortness of breath, dyspnea on exertion, Orthopnea, PND at the time of this visit. Review of System:  All other systems reviewed except for that noted above. Pertinent negatives and positives are:     · General: negative for fever, chills   · Ophthalmic ROS: negative for - eye pain or loss of vision  · ENT ROS: negative for - headaches, sore throat   · Respiratory: negative for - cough, sputum  · Cardiovascular: Reviewed in HPI  · Gastrointestinal: negative for - abdominal pain, diarrhea, N/V  · Hematology: negative for - bleeding, blood clots, bruising or jaundice  · Genito-Urinary:  negative for - Dysuria or incontinence  · Musculoskeletal: negative for - Joint swelling, muscle pain  · Neurological: negative for - confusion, dizziness, headaches   · Psychiatric: No anxiety, no depression.   · Dermatological: negative for - rash      Physical Examination:  Vitals:    07/10/22 0739   BP:    Pulse:    Resp:    Temp:    SpO2: 99%        Intake/Output Summary (Last 24 hours) at 7/10/2022 0951  Last data filed at 7/10/2022 0918  Gross per 24 hour   Intake 360 ml   Output 1250 ml   Net -890 ml     In: 360 [P.O.:360]  Out: 1250    Wt Readings from Last 3 Encounters:   07/10/22 223 lb 8.7 oz (101.4 kg)   22 226 lb (102.5 kg)   22 221 lb (100.2 kg)     Temp  Av.1 °F (36.7 °C)  Min: 97.8 °F (36.6 °C)  Max: 98.3 °F (36.8 °C)  Pulse  Av.1  Min: 61  Max: 91  BP  Min: 156/61  Max: 190/77  SpO2  Av.2 %  Min: 95 %  Max: 99 %    · Telemetry: Sinus rhythm with v-rates 60's bpm. 2022 @ 2100 through 0000 many atrial runs in the midst of sinus. · Constitutional: Alert. Oriented to person, place, and time. No distress. · Head: Normocephalic and atraumatic. · Mouth/Throat: Lips appear moist. Oropharynx is clear and moist.  · Eyes: Conjunctivae normal. EOM are normal.   · Neck: Neck supple. No lymphadenopathy. No rigidity. 14cm JVD present. · Cardiovascular: Normal rate, regular rhythm. Normal S1&S2. Carotid pulse 2+ bilaterally. · Pulmonary/Chest: Bilateral respiratory sounds present. No respiratory accessory muscle use. No wheezes, No rhonchi. + bibasilar rales. · Abdominal: Soft. Normal bowel sounds present. No distension, No tenderness. No splenomegaly. No hernia. · Musculoskeletal: No tenderness. Full range of motion in bilateral upper and lower extremities. No pitting BLE edema    · Lymphadenopathy: Has no cervical adenopathy. · Neurological: Alert and oriented. Cranial nerve II-XII grossly intact, No gross deficit to touch. · Skin: Skin is warm and dry. No rash, lesions, ulcerations noted. · Psychiatric: No anxiety nor agitation. Labs, telemetry, diagnostic and imaging results personally reviewed and interpreted. Recent Labs     22  1516      K 3.9      CO2 29   BUN 22*   CREATININE 1.1     Recent Labs     22  1516 22  2130 22  0600   WBC 10.5 9.0 11.8*   HGB 11.0* 11.9* 11.6*   HCT 35.4* 36.0 34.8*   MCV 93.4 88.8 88.3    201 213     Lab Results   Component Value Date/Time    TROPONINI 0.04 2022 03:16 PM     Estimated Creatinine Clearance: 50 mL/min (based on SCr of 1.1 mg/dL).    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    PAF (paroxysmal atrial fibrillation) ([facility]     AAA (abdominal aortic aneurysm) ([facility] 03/21/2018    Abnormal nuclear stress test     Atypical pneumonia     Atrial fibrillation with RVR (HCC)     Chronic diastolic heart failure (HCC)     Other emphysema (HCC)     Acute bronchitis     Acute respiratory failure with hypoxia (HCC)     Abnormal chest x-ray     Vitamin D deficiency 11/07/2017    Pelvic pain in female 10/13/2017    Pleural effusion, left 06/29/2015    Pleural effusion     COPD exacerbation [facility])       Active Hospital Problems    Diagnosis Date Noted    SOB (shortness of breath) [R06.02] 07/08/2022     Priority: Medium       Assessment and Plan:     Acute diastolic CHF  -hypervolemic. Will continue Lasix 80 mg IV BID. Strict I/O, daily wts, electrolyte repletion prn  -CHF nurse consultation for low-salt diet and outpatient CHF management    Persistent Afib  -currently in SR 60's but had many atrial runs in the midst of sinus from 2100 to 0000 7/9/2022.  -s/p several atrial fibrillation/L fluter ablations in the recent past, the most recent being 2/4/22  -cont Xarelto  -cont Toprol XL    Evette increase  -very slightly elevated  -most likely due to the stress of CHF  -no need for ischemic workup at this time    HTN  -cont Lisinopril    Will follow with you from general cardiology perspective. Thank you for allowing me to participate in the care of this patient. If you have any questions, please do not hesitate to contact me.     Denver Curry MD, MS, [address] [facility], [facility]  Cardiac Electrophysiology  [address]  [address], [address] [facility]  [facility]  [phone]

## 2022-07-10 NOTE — FLOWSHEET NOTE
Pt slept well overnight. Pure wick in place and functioning properly.  stayed overnight. No c/o pain or any distress. Very pleasant and cooperative. Call light in reach.

## 2022-07-10 NOTE — PROGRESS NOTES
Hospitalist Progress Note      PCP: Soni Sage MD    Date of Admission: 7/8/2022    Chief Complaint: shortness of breath     Hospital Course:   69 y.o. female with PMHx of A-fib, CHF, COPD, HLD and HTN presented to [facility] in transfer from [place] emergency department for management of COPD and CHF. Patient presented to [place] emergency department with 1 week history of shortness of breath. She has minimal sputum production. She has been using her inhalers without much improvement. She does use oxygen at night. Admitted with acute on chronic diastolic heart failure exacerbation with an element of COPD exacerbation. Subjective:   Output 2.4 in the last 24 hours. Continues to be on 2 liters of oxygen. Shortness of breath is improving.      Medications:  Reviewed    Infusion Medications    sodium chloride       Scheduled Medications    furosemide  80 mg IntraVENous BID    dofetilide  250 mcg Oral Q12H    lisinopril  20 mg Oral Daily    metoprolol succinate  25 mg Oral Daily    rivaroxaban  20 mg Oral Daily with breakfast    rosuvastatin  20 mg Oral Daily    sodium chloride flush  5-40 mL IntraVENous 2 times per day    ipratropium-albuterol  1 ampule Inhalation Q4H WA    methylPREDNISolone  40 mg IntraVENous Q6H    Followed by   Domenica Nava ON 7/11/2022] predniSONE  40 mg Oral Daily    doxycycline (VIBRAMYCIN) IV  100 mg IntraVENous Q12H     PRN Meds: calcium carbonate, albuterol, albuterol sulfate HFA, nitroGLYCERIN, sodium chloride flush, sodium chloride, polyethylene glycol, acetaminophen **OR** acetaminophen      Intake/Output Summary (Last 24 hours) at 7/10/2022 0839  Last data filed at 7/10/2022 0242  Gross per 24 hour   Intake --   Output 1250 ml   Net -1250 ml       Physical Exam Performed:    BP (!) 182/81   Pulse 61   Temp 97.8 °F (36.6 °C) (Oral)   Resp 24   Ht 5' 4\" (1.626 m)   Wt 223 lb 8.7 oz (101.4 kg)   SpO2 99%   BMI 38.37 kg/m²     General appearance: No apparent distress, appears stated age and cooperative. HEENT: Pupils equal, round, and reactive to light. Conjunctivae/corneas clear. Neck: Supple, with full range of motion. No jugular venous distention. Trachea midline. Respiratory: Very faint bilateral wheeze   cardiovascular: Regular rate and rhythm with normal S1/S2 without murmurs, rubs or gallops. Abdomen: Soft, non-tender, non-distended with normal bowel sounds. Musculoskeletal: No clubbing, cyanosis or edema bilaterally. Full range of motion without deformity. Skin: Skin color, texture, turgor normal.  No rashes or lesions. Neurologic:  Neurovascularly intact without any focal sensory/motor deficits. Cranial nerves: II-XII intact, grossly non-focal.  Psychiatric: Alert and oriented, thought content appropriate, normal insight  Capillary Refill: Brisk,3 seconds, normal   Peripheral Pulses: +2 palpable, equal bilaterally       Labs:   Recent Labs     07/08/22  1516 07/08/22  2130 07/09/22  0600   WBC 10.5 9.0 11.8*   HGB 11.0* 11.9* 11.6*   HCT 35.4* 36.0 34.8*    201 213     Recent Labs     07/08/22  1516      K 3.9      CO2 29   BUN 22*   CREATININE 1.1   CALCIUM 9.4     Recent Labs     07/08/22  1516   AST 18   ALT 14   BILITOT 0.3   ALKPHOS 77     No results for input(s): INR in the last 72 hours. Recent Labs     07/08/22  1516   TROPONINI 0.04*       Urinalysis:      Lab Results   Component Value Date/Time    NITRU Negative 09/09/2021 11:52 AM    WBCUA 1 09/09/2021 11:52 AM    BACTERIA 1+ 09/24/2020 06:30 PM    RBCUA 1 09/09/2021 11:52 AM    BLOODU TRACE 09/09/2021 11:52 AM    SPECGRAV 1.010 09/09/2021 11:52 AM    GLUCOSEU Negative 09/09/2021 11:52 AM       Radiology:  XR CHEST PORTABLE   Final Result   Increased lung markings bilaterally, may be related to mild pulmonary   vascular congestion versus bronchitis. Stable mild cardiomegaly.                  Assessment/Plan:    Active Hospital Problems    Diagnosis     SOB (shortness of breath) [R06.02]      Priority: Medium     Shortness of breath. For COPD exacerbation. Acute on chronic diastolic heart failure  last ECHO: 9/2021 EF 71% Grade 2 Diastolic Dysfunction, patient presented to emergency with shortness of breath that is likely related to both COPD exacerbation and diastolic heart failure. Patient only uses 2 L oxygen at nighttime. Currently maintained on 2 L of oxygen  Plan.   -Continue prednisone  -Appreciate cardiology input  -Keep on Lasix 80 mg IV twice daily  -Strict intake output  -Nebulizations  -Complete 5 days of doxycycline     Atrial Fibrillation   - currently rate controlled  - continue Xarelto, Dofetilide and metoprolol   EP following     Essential (primary) hypertension   - monitor blood pressure  - continue home meds      Hyperlipidemia   - continue statin    DVT Prophylaxis: On Xarelto  Diet: ADULT DIET; Regular  Code Status: Full Code    PT/OT Eval Status:  Following    Dispo -pending clinical improvement    Pearlene Lesch, MD

## 2022-07-10 NOTE — PLAN OF CARE
Problem: Discharge Planning  Goal: Discharge to home or other facility with appropriate resources  7/10/2022 0718 by Rosa Maria Zuniga RN  Outcome: Progressing  7/10/2022 0022 by Melisa Arteaga RN  Outcome: Progressing  Flowsheets (Taken 7/9/2022 2000)  Discharge to home or other facility with appropriate resources: Identify barriers to discharge with patient and caregiver     Problem: Skin/Tissue Integrity  Goal: Absence of new skin breakdown  Description: 1. Monitor for areas of redness and/or skin breakdown  2. Assess vascular access sites hourly  3. Every 4-6 hours minimum:  Change oxygen saturation probe site  4. Every 4-6 hours:  If on nasal continuous positive airway pressure, respiratory therapy assess nares and determine need for appliance change or resting period. 7/10/2022 0718 by Rosa Maria Zuniga RN  Outcome: Progressing  7/10/2022 0022 by Melisa Arteaga RN  Outcome: Progressing     Problem: Safety - Adult  Goal: Free from fall injury  7/10/2022 0718 by Rosa Maria Zuniga RN  Outcome: Progressing  Flowsheets (Taken 7/10/2022 0029 by Melisa Arteaga RN)  Free From Fall Injury: Instruct family/caregiver on patient safety  7/10/2022 0022 by Melisa Arteaga RN  Outcome: Progressing     Problem: ABCDS Injury Assessment  Goal: Absence of physical injury  Outcome: Progressing     Problem: Discharge Planning  Goal: Discharge to home or other facility with appropriate resources  7/10/2022 0718 by Rosa Maria Zuniga RN  Outcome: Progressing  7/10/2022 0022 by Melisa Arteaga RN  Outcome: Progressing  Flowsheets (Taken 7/9/2022 2000)  Discharge to home or other facility with appropriate resources: Identify barriers to discharge with patient and caregiver     Problem: Skin/Tissue Integrity  Goal: Absence of new skin breakdown  Description: 1. Monitor for areas of redness and/or skin breakdown  2. Assess vascular access sites hourly  3. Every 4-6 hours minimum:  Change oxygen saturation probe site  4.   Every 4-6 hours:  If on nasal continuous positive airway pressure, respiratory therapy assess nares and determine need for appliance change or resting period.   7/10/2022 0718 by Adela Virgen RN  Outcome: Progressing  7/10/2022 0022 by Adrien Angel RN  Outcome: Progressing     Problem: Safety - Adult  Goal: Free from fall injury  7/10/2022 0718 by Adela Virgen RN  Outcome: Progressing  Flowsheets (Taken 7/10/2022 0029 by Adrien Angel, RN)  Free From Fall Injury: Instruct family/caregiver on patient safety  7/10/2022 0022 by Adrien Angel RN  Outcome: Progressing     Problem: ABCDS Injury Assessment  Goal: Absence of physical injury  Outcome: Progressing

## 2022-07-10 NOTE — PLAN OF CARE
Problem: Discharge Planning  Goal: Discharge to home or other facility with appropriate resources  7/10/2022 0022 by Gerard Cody RN  Outcome: Progressing  7/9/2022 1117 by Geetha Smith RN  Outcome: Progressing   Continuing to work with patient and health care team on discharge plan. Discharge instructions and medication management will be reviewed prior to discharge. Problem: Skin/Tissue Integrity  Goal: Absence of new skin breakdown  Description: 1. Monitor for areas of redness and/or skin breakdown  2. Assess vascular access sites hourly  3. Every 4-6 hours minimum:  Change oxygen saturation probe site  4. Every 4-6 hours:  If on nasal continuous positive airway pressure, respiratory therapy assess nares and determine need for appliance change or resting period. 7/10/2022 0022 by Gerard Cody RN  Outcome: Progressing  7/9/2022 1117 by Geetha Smith RN  Outcome: Progressing   Skin assessment performed each shift per protocol. Patient turned and repositioned every two hours and prn with pillow support. Patient checked for incontence every two hours. Problem: Safety - Adult  Goal: Free from fall injury  7/10/2022 0022 by Gerard Cody RN  Outcome: Progressing  7/9/2022 1117 by Geetha Smith RN  Outcome: Progressing   Pt free from falls this shift. Fall precautions in place at all times. Call light always within reach. Pt able and agreeable to contact for safety appropriately.

## 2022-07-11 LAB
ANION GAP SERPL CALCULATED.3IONS-SCNC: 21 MMOL/L (ref 3–16)
BASOPHILS ABSOLUTE: 0 K/UL (ref 0–0.2)
BASOPHILS RELATIVE PERCENT: 0.1 %
BUN BLDV-MCNC: 54 MG/DL (ref 7–20)
CALCIUM SERPL-MCNC: 8.8 MG/DL (ref 8.3–10.6)
CHLORIDE BLD-SCNC: 96 MMOL/L (ref 99–110)
CO2: 25 MMOL/L (ref 21–32)
CREAT SERPL-MCNC: 1.7 MG/DL (ref 0.6–1.2)
EKG ATRIAL RATE: 86 BPM
EKG DIAGNOSIS: NORMAL
EKG P AXIS: 82 DEGREES
EKG P-R INTERVAL: 278 MS
EKG Q-T INTERVAL: 408 MS
EKG QRS DURATION: 96 MS
EKG QTC CALCULATION (BAZETT): 488 MS
EKG R AXIS: -16 DEGREES
EKG T AXIS: 13 DEGREES
EKG VENTRICULAR RATE: 86 BPM
EOSINOPHILS ABSOLUTE: 0 K/UL (ref 0–0.6)
EOSINOPHILS RELATIVE PERCENT: 0 %
GFR AFRICAN AMERICAN: 35
GFR NON-AFRICAN AMERICAN: 29
GLUCOSE BLD-MCNC: 126 MG/DL (ref 70–99)
HCT VFR BLD CALC: 35.7 % (ref 36–48)
HEMOGLOBIN: 11.7 G/DL (ref 12–16)
LYMPHOCYTES ABSOLUTE: 0.9 K/UL (ref 1–5.1)
LYMPHOCYTES RELATIVE PERCENT: 5.4 %
MAGNESIUM: 2 MG/DL (ref 1.8–2.4)
MCH RBC QN AUTO: 29.6 PG (ref 26–34)
MCHC RBC AUTO-ENTMCNC: 32.8 G/DL (ref 31–36)
MCV RBC AUTO: 90.4 FL (ref 80–100)
MONOCYTES ABSOLUTE: 0.9 K/UL (ref 0–1.3)
MONOCYTES RELATIVE PERCENT: 5.6 %
NEUTROPHILS ABSOLUTE: 14.8 K/UL (ref 1.7–7.7)
NEUTROPHILS RELATIVE PERCENT: 88.9 %
PDW BLD-RTO: 16.1 % (ref 12.4–15.4)
PLATELET # BLD: 223 K/UL (ref 135–450)
PMV BLD AUTO: 9.8 FL (ref 5–10.5)
POTASSIUM SERPL-SCNC: 3.5 MMOL/L (ref 3.5–5.1)
PRO-BNP: 5004 PG/ML (ref 0–449)
RBC # BLD: 3.95 M/UL (ref 4–5.2)
SODIUM BLD-SCNC: 142 MMOL/L (ref 136–145)
WBC # BLD: 16.6 K/UL (ref 4–11)

## 2022-07-11 PROCEDURE — 83880 ASSAY OF NATRIURETIC PEPTIDE: CPT

## 2022-07-11 PROCEDURE — 1200000000 HC SEMI PRIVATE

## 2022-07-11 PROCEDURE — 94761 N-INVAS EAR/PLS OXIMETRY MLT: CPT

## 2022-07-11 PROCEDURE — 99232 SBSQ HOSP IP/OBS MODERATE 35: CPT | Performed by: NURSE PRACTITIONER

## 2022-07-11 PROCEDURE — 97530 THERAPEUTIC ACTIVITIES: CPT

## 2022-07-11 PROCEDURE — 2500000003 HC RX 250 WO HCPCS: Performed by: PEDIATRICS

## 2022-07-11 PROCEDURE — 85025 COMPLETE CBC W/AUTO DIFF WBC: CPT

## 2022-07-11 PROCEDURE — 94640 AIRWAY INHALATION TREATMENT: CPT

## 2022-07-11 PROCEDURE — 6370000000 HC RX 637 (ALT 250 FOR IP): Performed by: HOSPITALIST

## 2022-07-11 PROCEDURE — 83735 ASSAY OF MAGNESIUM: CPT

## 2022-07-11 PROCEDURE — 97116 GAIT TRAINING THERAPY: CPT

## 2022-07-11 PROCEDURE — 2500000003 HC RX 250 WO HCPCS: Performed by: STUDENT IN AN ORGANIZED HEALTH CARE EDUCATION/TRAINING PROGRAM

## 2022-07-11 PROCEDURE — 6360000002 HC RX W HCPCS: Performed by: INTERNAL MEDICINE

## 2022-07-11 PROCEDURE — 2580000003 HC RX 258: Performed by: PEDIATRICS

## 2022-07-11 PROCEDURE — 36415 COLL VENOUS BLD VENIPUNCTURE: CPT

## 2022-07-11 PROCEDURE — 93010 ELECTROCARDIOGRAM REPORT: CPT | Performed by: INTERNAL MEDICINE

## 2022-07-11 PROCEDURE — 93005 ELECTROCARDIOGRAM TRACING: CPT | Performed by: NURSE PRACTITIONER

## 2022-07-11 PROCEDURE — 80048 BASIC METABOLIC PNL TOTAL CA: CPT

## 2022-07-11 PROCEDURE — 6370000000 HC RX 637 (ALT 250 FOR IP): Performed by: NURSE PRACTITIONER

## 2022-07-11 PROCEDURE — 2700000000 HC OXYGEN THERAPY PER DAY

## 2022-07-11 PROCEDURE — 2580000003 HC RX 258: Performed by: HOSPITALIST

## 2022-07-11 RX ORDER — IPRATROPIUM BROMIDE AND ALBUTEROL SULFATE 2.5; .5 MG/3ML; MG/3ML
1 SOLUTION RESPIRATORY (INHALATION) 3 TIMES DAILY
Status: DISCONTINUED | OUTPATIENT
Start: 2022-07-11 | End: 2022-07-12 | Stop reason: HOSPADM

## 2022-07-11 RX ORDER — METOPROLOL TARTRATE 5 MG/5ML
5 INJECTION INTRAVENOUS ONCE
Status: COMPLETED | OUTPATIENT
Start: 2022-07-11 | End: 2022-07-11

## 2022-07-11 RX ORDER — POTASSIUM CHLORIDE 750 MG/1
20 TABLET, FILM COATED, EXTENDED RELEASE ORAL ONCE
Status: COMPLETED | OUTPATIENT
Start: 2022-07-11 | End: 2022-07-11

## 2022-07-11 RX ORDER — HYDRALAZINE HYDROCHLORIDE 10 MG/1
10 TABLET, FILM COATED ORAL EVERY 8 HOURS SCHEDULED
Status: DISCONTINUED | OUTPATIENT
Start: 2022-07-11 | End: 2022-07-12

## 2022-07-11 RX ADMIN — SODIUM CHLORIDE, PRESERVATIVE FREE 10 ML: 5 INJECTION INTRAVENOUS at 08:24

## 2022-07-11 RX ADMIN — POTASSIUM CHLORIDE 20 MEQ: 750 TABLET, FILM COATED, EXTENDED RELEASE ORAL at 14:03

## 2022-07-11 RX ADMIN — DOXYCYCLINE 100 MG: 100 INJECTION, POWDER, LYOPHILIZED, FOR SOLUTION INTRAVENOUS at 09:09

## 2022-07-11 RX ADMIN — HYDRALAZINE HYDROCHLORIDE 10 MG: 10 TABLET, FILM COATED ORAL at 14:03

## 2022-07-11 RX ADMIN — LISINOPRIL 20 MG: 20 TABLET ORAL at 08:24

## 2022-07-11 RX ADMIN — FUROSEMIDE 80 MG: 10 INJECTION, SOLUTION INTRAMUSCULAR; INTRAVENOUS at 08:21

## 2022-07-11 RX ADMIN — IPRATROPIUM BROMIDE AND ALBUTEROL SULFATE 1 AMPULE: .5; 3 SOLUTION RESPIRATORY (INHALATION) at 08:08

## 2022-07-11 RX ADMIN — DOFETILIDE 250 MCG: 0.25 CAPSULE ORAL at 20:55

## 2022-07-11 RX ADMIN — SODIUM CHLORIDE, PRESERVATIVE FREE 10 ML: 5 INJECTION INTRAVENOUS at 20:56

## 2022-07-11 RX ADMIN — ROSUVASTATIN CALCIUM 20 MG: 20 TABLET, FILM COATED ORAL at 08:23

## 2022-07-11 RX ADMIN — HYDRALAZINE HYDROCHLORIDE 10 MG: 10 TABLET, FILM COATED ORAL at 20:55

## 2022-07-11 RX ADMIN — METOPROLOL SUCCINATE 25 MG: 25 TABLET, EXTENDED RELEASE ORAL at 08:24

## 2022-07-11 RX ADMIN — IPRATROPIUM BROMIDE AND ALBUTEROL SULFATE 1 AMPULE: .5; 3 SOLUTION RESPIRATORY (INHALATION) at 12:21

## 2022-07-11 RX ADMIN — IPRATROPIUM BROMIDE AND ALBUTEROL SULFATE 1 AMPULE: .5; 3 SOLUTION RESPIRATORY (INHALATION) at 15:09

## 2022-07-11 RX ADMIN — DOFETILIDE 250 MCG: 0.25 CAPSULE ORAL at 08:32

## 2022-07-11 RX ADMIN — DOXYCYCLINE 100 MG: 100 INJECTION, POWDER, LYOPHILIZED, FOR SOLUTION INTRAVENOUS at 20:59

## 2022-07-11 RX ADMIN — SODIUM CHLORIDE, PRESERVATIVE FREE 5 ML: 5 INJECTION INTRAVENOUS at 20:55

## 2022-07-11 RX ADMIN — PREDNISONE 40 MG: 20 TABLET ORAL at 08:23

## 2022-07-11 RX ADMIN — METOPROLOL TARTRATE 5 MG: 5 INJECTION INTRAVENOUS at 17:59

## 2022-07-11 RX ADMIN — RIVAROXABAN 20 MG: 20 TABLET, FILM COATED ORAL at 08:23

## 2022-07-11 ASSESSMENT — PAIN SCALES - GENERAL: PAINLEVEL_OUTOF10: 0

## 2022-07-11 NOTE — CARE COORDINATION
F/U: DME order requested for a rollator  DISCHARGE PLAN: Pt plans to return home with her spouse. Spouse will transport pt home at d/c. Daniel called for a rollator. Pt is declining HC.  ___________________________________  INITIAL ASSESSMENT  Met w/pt and pts spouse to address barriers to dc. Pt was agreeable to spouse remaining present for this assessment. HOME: Pt resides in a mobile home with her spouse. There is 1 DEIDRE. Disease Specific: COPD Exacerbation    COVID Vaccination: Yes    DME/O2: Rolling walker. Pt is requesting a rollator. PT recommended this. MD notified for the DME order and Daniel contacted. ACTIVE SERVICES: Pt reported that she is independent with all self care and hua snot anticipate the need for any assistance upon return home. TRANSPORTATION: Pt is an active  and stated that her spouse will transport her home at d/c. PHARMACY: Denies difficulty obtaining/taking meds. Pt has all meds filled at [facility] in [facility]. PCP: Delia Robbins    DEMOGRAPHICS: Verified address/phone number as correct    INSURANCE:  Medicare/[facility]  PRESCRIPTION COVERAGE: [facility]    HD/PD: No    THERAPY RECS    PHYSICAL THERAPY  \"Discharge Recommendations:  Home with Home health PT,Home with assist PRN   PT Equipment Recommendations  Equipment Needed: Yes  Will Juanita: Rollator (4 Wheeled)  Other: Pt would benefit from a rollator walker for community ambulation as it would increase her safety and allow her to walk farther distances if she could sit and rest on the rollator seat when out\"    OCCUPATIONAL THERAPY  Awaiting recommendations    Pt is declining HC at this time. Discharge planning team will remain available for needs. Please consult for any specifics not addressed in this note.     CON[facility]  [phone]  Electronically signed by Valery Moralez on 7/11/2022 at 3:43 PM

## 2022-07-11 NOTE — ACP (ADVANCE CARE PLANNING)
wishes  [] New Advance Directive completed  [] Portable Do Not Rescitate prepared for Provider review and signature  [] POLST/POST/MOLST/MOST prepared for Provider review and signature      Follow-up plan:    [] Schedule follow-up conversation to continue planning  [] Referred individual to Provider for additional questions/concerns   [] Advised patient/agent/surrogate to review completed ACP document and update if needed with changes in condition, patient preferences or care setting    [x] This note routed to one or more involved healthcare providers    Electronically signed by Elías Lane on 7/11/2022 at 3:44 PM

## 2022-07-11 NOTE — PROGRESS NOTES
Pt currently in Afib with RVR. Pt -135. Pt asymptomatic. Dr. Hansa Botello notified, one time dose of 5 mg IV metoprolol ordered. Metoprolol administered per order. This nurse not notified by central monitoring of pt HR >125. Central monitoring parameters modified to alert of HR >110.

## 2022-07-11 NOTE — PROGRESS NOTES
[address]   Daily Progress Note      Admit Date:  7/8/2022    CC: SOB    HPI:   Nena Portillo is a 69 y.o. female with PMH CAD, AF s.p PVI ablation 10/2020- recurrent AF with DCCV 1/2021 and repeat AF ablation 2/2021 (failed flecaininde), HTN, aortic aneurysm, AAA s.p repair 3/2018 (Dr. Ferrer Art, DENISE (intolerant to CPAP). Morena Pendleton stress>[facility] 3/2018 revealed  of RCA and non obstructive dz of LAD and LCX. Adm to [facility] 5/2022 for dofetilide loading, converted to NSR. Seen by Dr. Kathryn Henry 6/24/2022 with increasing SOB. Underwent OP labs,CXR. Lasix increased for increased BNP. Presented to [facility] ED 7/8 with worsening SOB and admitted for acute on chronic diastolic heart failure. Started on IV lasix and has diuresed. Today she is sitting in the chair. She completed PT this AM and states she tolerated well. She is wearing O2 intermittently for SOB. Overall, she says her SOB is improved. Denies CP, LH, dizziness, palpitations, syncope, orthopnea. Review of Systems:   General: Denies fever, chills  Skin: Denies skin changes, rash, itching, lesions.   HEENT: Denies headache, dizziness, vision changes, nosebleeds, sore throat, nasal drainage  RESP: Denies cough, sputum,wheeze, snoring  CARD: Denies palpitations,  murmur  GI:Denies nausea, vomiting, heartburn, loss of appetite, change in bowels  : Denies frequency, pain, incontinence, polyuria  VASC: Denies claudication, leg cramps, clots  MUSC/SKEL: Denies pain, stiffness, arthritis  PSYCH: Denies anxiety, depression, stress  NEURO: Denies numbness, tingling, weakness,change in mood or memory  HEME: Denies abn bruising, bleeding, anemia  ENDO: Denies intolerance to heat, cold, excessive thirst or hunger, hx thyroid disease    Objective:   BP (!) 183/75   Pulse 81   Temp 97.5 °F (36.4 °C) (Oral)   Resp 20   Ht 5' 4\" (1.626 m)   Wt 224 lb 13.9 oz (102 kg)   SpO2 99%   BMI 38.60 kg/m²         Intake/Output Summary (Last 24 hours) at 1.1 1.7*     GLUCOSE:   Recent Labs     07/08/22  1516 07/11/22  0556   GLUCOSE 123* 126*     LIVER PROFILE:   Lab Results   Component Value Date/Time    AST 18 07/08/2022 03:16 PM    ALT 14 07/08/2022 03:16 PM    LIPASE 43.0 06/02/2015 01:41 AM    AMYLASE 34 10/02/2013 11:46 AM    LABALBU 3.8 07/08/2022 03:16 PM    BILIDIR <0.2 07/14/2015 07:24 AM    BILITOT 0.3 07/08/2022 03:16 PM    ALKPHOS 77 07/08/2022 03:16 PM     PT/INR:   Lab Results   Component Value Date/Time    PROTIME 26.0 02/15/2020 06:28 PM    INR 2.22 02/15/2020 06:28 PM    INR 1.00 03/21/2018 06:40 AM    INR 1.04 02/24/2018 11:44 AM     APTT:   Lab Results   Component Value Date/Time    APTT 37.2 02/15/2020 06:28 PM     Pro-BNP:    Lab Results   Component Value Date/Time    PROBNP 2,136 07/08/2022 03:16 PM    PROBNP 2,209 06/24/2022 03:15 PM    PROBNP 815 11/08/2021 02:10 PM     Parameters:   > 450 pg/mL under age 48  > 900 pg/mL ages 54-65  > 1800 pg/mL over age 76    ENZYMES:  Lab Results   Component Value Date/Time    TROPONINI 0.04 07/08/2022 03:16 PM    TROPONINI <0.01 09/09/2021 06:52 PM    TROPONINI 0.02 09/09/2021 04:05 PM     FASTING LIPID PANEL:  Lab Results   Component Value Date/Time    CHOL 107 09/10/2021 07:22 AM    HDL 56 09/10/2021 07:22 AM    HDL 38 09/09/2011 03:35 PM    LDLCALC 39 09/10/2021 07:22 AM    TRIG 59 09/10/2021 07:22 AM       Diagnostics:    EKG:   Normal sinus rhythm  Voltage criteria for left ventricular hypertrophy  Septal infarct (cited on or before 07-JAN-2018)  Prolonged QT  Abnormal ECG  When compared with ECG of 26-MAY-2022 10:01,  Questionable change in initial forces of Anterior leads  Voltage criteria for left ventricular hypertrophy is now Present  Confirmed by Conner, [address] [place] [address] ([id number]) on 7/9/2022 12:12:14 PM    LH: ([facility]) 4/2017   of RCA chronic LAD 10-20% RA 4 PA24/9 16 wedge 9 LVEDP markedly elevated 30    Carotid US 10/2017 at [facility]:  1. Estimated diameter reduction of the right internal carotid artery is  less than 50%. 2.  Estimated diameter reduction of the left internal carotid artery is  50-69%. 3.  The bilateral common carotid arteries reveal no evidence of   significant stenosis. 4.  There is greater than 50% stenosis of the right external carotid  artery. 5.  There is no evidence of significant stenosis in the left external  carotid artery. 6.  The bilateral vertebral arteries are patent with antegrade flow. 7.  The bilateral subclavian arteries reveal no evidence of significant  stenosis. 8.  There has been no significant change from the previous study of  4/21/2017. Echo 1/9/2018:  Mild concentric left ventricular hypertrophy. Visually estimated ejection fraction of 65%. Mitral annular calcification. Mild left atrial dilation. Mild aortic stenosis. (mean gradients 60VZGE)  The systolic pulmonary artery pressure (SPAP) estimated at 30 mmHg  (estimated RA pressure of 8 mmHg included). Edvin Cadet 2/19/18   Summary    Abnormal study. There is a moderate sized, mild intensity, partially    reversible defect of the mid to apical anterior and mid anterolateral walls    which is consistent with ischemia. There is breast attenuation but stress    images appear worse.    Normal LV size and systolic function.    Overall findings represent an intermediate risk study     Cardiac Cath 3/5/18  OVERALL IMPRESSION  1.  Again, 100% proximal right coronary artery occlusion with left to right  collaterals. 2.  Mild disease of the distal left main trunk. 3.  20% to 30% ostial left anterior descending artery stenosis with  collaterals from LAD to the right coronary artery. 4.  30% to 40% stenosis of the proximal circumflex artery. 5.  Normal left ventricular systolic function with estimated EF of 55% to  60%.   6.  Slightly enlarged aortic root with no evidence of aortic stenosis or  regurgitation.     In view of the above findings, we will okay the patient for her upcoming  aortic aneurysm surgery from cardiac standpoint.     ECHO 1/2/20  There is moderate concentric left ventricular hypertrophy. Patient appears to be in atrial fibrillation. Ejection fraction is estimated to be 50-60%. Indeterminate diastolic function. Mild aortic regurgitation. Mild tricuspid regurgitation. Mild mitral regurgitation with mitral annular calcification     Right Heart Cath 2/28/2020  1.  _____ normal right cardiac pressure. 2.  Elevated pulmonary capillary wedge pressure with a mean pulmonary  capillary wedge of 29 mmHg. 3.  Normal cardiac output and indices. 4.  No oxygen step off to suggest ASD/PFO.   In view of the above findings, we will start the patient on a small dose  of diuretic therapy and see whether we need to adjust some of her  antihypertensive medicines or not.  Her findings are consistent with a  diastolic heart failure.     ECHO 9/10/2022  Summary   Mod conc. LVH with normal LV size & wall motion. EF is    60%. Diastolic   filling parameters suggest grade II diastolic dysfunction. The left atrium is severely dilated. Normal right ventricular size and function. Trivial mitral, aortic and tricuspid regurgitation. Assessment/Plan:    1.) Acute on chronic diastolic heart failure, Gr II, LVEF 60%: Improved, appears euvolemic. BUN/Creat up today - will hold diuresis. Check BNP. SOB multifactorial with COPD. NYHA Class III   Stage C  Diuretic: hold, plan to resume oral lasix when renal fx improved  BB, ACEi/ARB/ARNI, AA, SGLT2i not necessarily indicated for HFpEF. Cardiac Rehab for EF <35%: NA, EF 60%. 2gm Na diet, daily weight, 64 oz fluid restriction  Avoid NSAIDS and other nephrotoxic meds  [facility]: OP  HF RN referral for education    2.) Paroxysmal Atrial Fib: s/p atrial fib and L flutter ablations, failed flecainide, now on dofetilide. NSR w runs atrial tach - has been followed by EP last few days.   CHADSVASC-     HASBLED-  Thromboembolic risk reduction: xarelto  Rate Control/ Rhythm Control: tikosyn, toprol XL    3.) Elevated troponin: Mild, most likely from HF exacerbation. No evidence of ischemia    4.) Hypertension:   Goal BP <130/80. Not met- add hydralazine  Unclear if BP controlled as OP, would recommend monitoring home BP for med adjustment. Possibly elevated this hospitalization with steroids. Will cont to monitor. Non pharmacologic interventions include:   -weight loss  -heart healthy low sodium and low fat diet that consist of mostly fruits, vegetables and grains (Dash diet)  -limited amount of alcohol (no more than 1 drink/day for women, 2 drinks/day for men)  -regular physical activity  -no smoking  -stress reduction    5.) MARK: BUNCreat 54/1.7 - most likely from diureses. Hold today.     Electronically signed by CHRISTINE Victoria CNP on 7/11/2022 at 9:48 AM

## 2022-07-11 NOTE — PROGRESS NOTES
Physical Therapy  Facility/Department: [facility]  Physical Therapy Daily Treatment Note  Name: Gage Shannon  : 1945  MRN: [id number]  Date of Service: 2022    Discharge Recommendations:  Home with Home health PT,Home with assist PRN   PT Equipment Recommendations  Equipment Needed: Yes  Arnie Au: Rollator (4 Wheeled)  Other: Pt would benefit from a rollator walker for community ambulation as it would increase her safety and allow her to walk farther distances if she could sit and rest on the rollator seat when out    Gage Shannon scored a 21/24 on the AM-PAC short mobility form. Current research shows that an AM-PAC score of 18 or greater is typically associated with a discharge to the patient's home setting. Based on the patient's AM-PAC score and their current functional mobility deficits, it is recommended that the patient have 2-3 sessions per week of Physical Therapy at d/c to increase the patient's independence. At this time, this patient demonstrates the endurance and safety to discharge home with home services and a follow up treatment frequency of 2-3x/wk. Please see assessment section for further patient specific details. If patient discharges prior to next session this note will serve as a discharge summary. Please see below for the latest assessment towards goals. Patient Diagnosis(es): The primary encounter diagnosis was Dyspnea and respiratory abnormalities. Diagnoses of COPD exacerbation ([address] and Acute on chronic diastolic congestive heart failure ([address] were also pertinent to this visit. Past Medical History:  has a past medical history of AAA (abdominal aortic aneurysm) ([address] AAA (abdominal aortic aneurysm) without rupture (HCC), Atrial fibrillation ([address] CAD (coronary artery disease), CHF (congestive heart failure) ([address] COPD (chronic obstructive pulmonary disease) ([address] History of blood clots, Hyperlipidemia, and Hypertension.   Past Surgical History:  has a past surgical history that includes Colonoscopy (9/2/07); Hysterectomy (1990); Appendectomy (1990); bladder suspension; Cataract removal; Tonsillectomy (as a child); tumor excision; Cholecystectomy (10/15/13); Colonoscopy (06/16/2017); joint replacement (12/2013); Abdominal aortic aneurysm repair; and Cardiac surgery. Assessment   Body Structures, Functions, Activity Limitations Requiring Skilled Therapeutic Intervention: Decreased functional mobility ; Decreased endurance;Decreased balance  Assessment: Pt presents today mildly below prior level of I with self care without an AD in home,  occasionally helps with socks/shoes and  does housework. Today pt was SBA for transfers, toileting and ambulation in room, SOB with O2 sat dropping to 94% with household walking. Pt would benefit from a rollator walker for community ambulation as it would increase her safety and allow her to walk farther distances if she could sit and rest on the rolltor seat when out. Pt is safe to go home with family assist and home PT. Treatment Diagnosis: decerased functional mobility following exacerbation CHF  Activity Tolerance  Activity Tolerance: Patient limited by fatigue;Patient limited by endurance     Plan   Plan  Plan: 2-3 times per week  Plan weeks: for acute stay  Current Treatment Recommendations: Balance training,Gait training,Endurance training  Safety Devices  Type of Devices: Call light within reach,Chair alarm in place,Gait belt,Left in chair,Nurse notified     Restrictions  Restrictions/Precautions  Restrictions/Precautions: Fall Risk     Subjective   General  Chart Reviewed: Yes  Additional Pertinent Hx: Pt admitted after several days of worsening SOB, on room air but SOB with activity  Response To Previous Treatment: Patient with no complaints from previous session.   Family / Caregiver Present: No  Referring Practitioner: Alexei  Diagnosis: SOB, worsening CHF  Follows Commands: Within Functional Limits  Subjective  Subjective: denies pain, reports sleeping well, has to go to the bathroom due to being on diuretic, no purewick or catheter         Social/Functional History  Social/Functional History  Lives With: Spouse  Type of Home: Mobile home  Home Layout: One level  Home Access: Stairs to enter with rails  Entrance Stairs - Number of Steps: 1  Bathroom Shower/Tub:  (walk in tub with small step)  Bathroom Equipment: Built-in shower seat,Grab bars in shower  Home Equipment: Walker, rolling (pt would like a rollator with a seat)  Has the patient had two or more falls in the past year or any fall with injury in the past year?: No  ADL Assistance: Independent (pt can do I but  sometimes helps with socks/shoes)  Homemaking Assistance: Needs assistance  Homemaking Responsibilities: No  Ambulation Assistance: Independent  Transfer Assistance: Independent  Active : Yes  Occupation: Retired  Type of Occupation: [employer]  Leisure & Hobbies: walking, karioke on wednesday  Vision/Hearing       Cognition         Objective   Transfers  Sit to Stand: Supervision (toilet and recliner)  Stand to sit: Modified independent (toilet and recliner)  Ambulation  Surface: level tile  Device: No Device  Assistance: Stand by assistance  Quality of Gait: small steps, quick pace, no increased lateral sway noted today  Gait Deviations: Decreased step length;Decreased step height  Distance: 25' x 2 recliner <> toilet  Comments: today pt is on 2L O2, SOB with ambulation, O2 sat = 97% and HR = 89 initially, after walking nad toileting O2 sat = 94% and HR = 133 briefly, decreased to 109 within 1 minute  More Ambulation?: No     Balance  Posture: Fair  Sitting - Static: Good  Sitting - Dynamic: Good  Standing - Static: Good  Standing - Dynamic: Good;-  A/AROM Exercises: sitting AP, ankle circles, TKE, hip flex x 15.  UE press-ups and horizontal abduction x 15 each, mild SOB, O2 = 95% and HR = 110      AM-PAC Score  AM-PAC Inpatient Mobility Raw Score : 21 (07/11/22 0952)  AM-PAC Inpatient T-Scale Score : 50.25 (07/11/22 9630)  Mobility Inpatient CMS 0-100% Score: 28.97 (07/11/22 7996)  Mobility Inpatient CMS G-Code Modifier : CJ (07/11/22 1291)     Goals  Short Term Goals  Time Frame for Short term goals: by acute discharge (pt progresing toward goals for level of assist, ambulation distance still limited)  Short term goal 1: all transfers modif I  Short term goal 2: amubulation with rolator x 100' with O2 sat >90%  Patient Goals   Patient goals : return home, would like a rollator for longer distances and outdoor ambulation   Therapy Time   Individual Concurrent Group Co-treatment   Time In 0930         Time Out 1002         Minutes 32         Timed Code Treatment Minutes: 32 Minutes   charges = 15 min gt 17 min theract    Linda Rosales, [address]

## 2022-07-11 NOTE — PLAN OF CARE
Problem: Discharge Planning  Goal: Discharge to home or other facility with appropriate resources  7/11/2022 0743 by Pedro Gamez RN  Outcome: Progressing  7/11/2022 0050 by Dawna Duarte RN  Outcome: Progressing  Flowsheets (Taken 7/10/2022 2059)  Discharge to home or other facility with appropriate resources: Identify barriers to discharge with patient and caregiver     Problem: Skin/Tissue Integrity  Goal: Absence of new skin breakdown  Description: 1. Monitor for areas of redness and/or skin breakdown  2. Assess vascular access sites hourly  3. Every 4-6 hours minimum:  Change oxygen saturation probe site  4. Every 4-6 hours:  If on nasal continuous positive airway pressure, respiratory therapy assess nares and determine need for appliance change or resting period.   7/11/2022 0743 by Pedro Gamez RN  Outcome: Progressing  7/11/2022 0050 by Dawna Duarte RN  Outcome: Progressing     Problem: Safety - Adult  Goal: Free from fall injury  7/11/2022 0743 by Pedro Gamez RN  Outcome: Progressing  Flowsheets (Taken 7/11/2022 0055 by Dawna Duarte RN)  Free From Fall Injury: Instruct family/caregiver on patient safety  7/11/2022 0050 by Dawna Duarte RN  Outcome: Progressing     Problem: ABCDS Injury Assessment  Goal: Absence of physical injury  7/11/2022 0743 by Pedro Gamez RN  Outcome: Progressing  Flowsheets (Taken 7/11/2022 0055 by Dawna Duarte RN)  Absence of Physical Injury: Implement safety measures based on patient assessment  7/11/2022 0050 by Dawna Durate RN  Outcome: Progressing

## 2022-07-11 NOTE — RT PROTOCOL NOTE
RT Nebulizer Bronchodilator Protocol Note    There is a bronchodilator order in the chart from a provider indicating to follow the RT Bronchodilator Protocol and there is an Initiate RT Bronchodilator Protocol order as well (see protocol at bottom of note). CXR Findings:  No results found. The findings from the last RT Protocol Assessment were as follows:  Smoking: Chronic pulmonary disease  Respiratory Pattern: Dyspnea on exertion or RR 21-25 bpm  Breath Sounds: Slightly diminished and/or crackles  Cough: Strong, productive  Indication for Bronchodilator Therapy: On home bronchodilators  Bronchodilator Assessment Score: 7. Changed to TID per protocol. Pt states only takes as needed    Aerosolized bronchodilator medication orders have been revised according to the RT Nebulizer Bronchodilator Protocol below. Respiratory Therapist to perform RT Therapy Protocol Assessment initially then follow the protocol. Repeat RT Therapy Protocol Assessment PRN for score 0-3 or on second treatment, BID, and PRN for scores above 3. No Indications - adjust the frequency to every 6 hours PRN wheezing or bronchospasm, if no treatments needed after 48 hours then discontinue using Per Protocol order mode. If indication present, adjust the RT bronchodilator orders based on the Bronchodilator Assessment Score as indicated below. If a patient is on this medication at home then do not decrease Frequency below that used at home. 0-3 - enter or revise RT bronchodilator order(s) to equivalent RT Bronchodilator order with Frequency of every 4 hours PRN for wheezing or increased work of breathing using Per Protocol order mode. 4-6 - enter or revise RT Bronchodilator order(s) to two equivalent RT bronchodilator orders with one order with BID Frequency and one order with Frequency of every 4 hours PRN wheezing or increased work of breathing using Per Protocol order mode.          7-10 - enter or revise RT Bronchodilator order(s) to two equivalent RT bronchodilator orders with one order with TID Frequency and one order with Frequency of every 4 hours PRN wheezing or increased work of breathing using Per Protocol order mode. 11-13 - enter or revise RT Bronchodilator order(s) to one equivalent RT bronchodilator order with QID Frequency and an Albuterol order with Frequency of every 4 hours PRN wheezing or increased work of breathing using Per Protocol order mode. Greater than 13 - enter or revise RT Bronchodilator order(s) to one equivalent RT bronchodilator order with every 4 hours Frequency and an Albuterol order with Frequency of every 2 hours PRN wheezing or increased work of breathing using Per Protocol order mode. RT to enter RT Home Evaluation for COPD & MDI Assessment order using Per Protocol order mode.     Electronically signed by Michael Crook RCP on 7/11/2022 at 3:19 PM

## 2022-07-11 NOTE — PROGRESS NOTES
Hospitalist Progress Note      PCP: Maria Esther Chirinos MD    Date of Admission: 7/8/2022    Chief Complaint: shortness of breath     Hospital Course:   69 y.o. female with PMHx of A-fib, CHF, COPD, HLD and HTN presented to [facility] in transfer from [facility] emergency department for management of COPD and CHF. Patient presented to [facility] emergency department with 1 week history of shortness of breath. She has minimal sputum production. She has been using her inhalers without much improvement. She does use oxygen at night. Admitted with acute on chronic diastolic heart failure exacerbation with an element of COPD exacerbation. Subjective:   Output 2.5 in the last 24 hours. Weaned off oxygen, patient is on baseline trace function at night    Medications:  Reviewed    Infusion Medications    sodium chloride       Scheduled Medications    hydrALAZINE  10 mg Oral 3 times per day    dofetilide  250 mcg Oral Q12H    lisinopril  20 mg Oral Daily    metoprolol succinate  25 mg Oral Daily    rivaroxaban  20 mg Oral Daily with breakfast    rosuvastatin  20 mg Oral Daily    sodium chloride flush  5-40 mL IntraVENous 2 times per day    ipratropium-albuterol  1 ampule Inhalation Q4H WA    predniSONE  40 mg Oral Daily    doxycycline (VIBRAMYCIN) IV  100 mg IntraVENous Q12H     PRN Meds: calcium carbonate, albuterol, albuterol sulfate HFA, nitroGLYCERIN, sodium chloride flush, sodium chloride, polyethylene glycol, acetaminophen **OR** acetaminophen      Intake/Output Summary (Last 24 hours) at 7/11/2022 1420  Last data filed at 7/11/2022 1405  Gross per 24 hour   Intake 1920 ml   Output 2500 ml   Net -580 ml       Physical Exam Performed:    BP (!) 151/73   Pulse 57   Temp 97.7 °F (36.5 °C) (Oral)   Resp 16   Ht 5' 4\" (1.626 m)   Wt 224 lb 13.9 oz (102 kg)   SpO2 91%   BMI 38.60 kg/m²     General appearance: No apparent distress, appears stated age and cooperative.   HEENT: Pupils equal, round, and reactive to light. Conjunctivae/corneas clear. Neck: Supple, with full range of motion. No jugular venous distention. Trachea midline. Respiratory: Very faint bilateral wheeze   cardiovascular: Regular rate and rhythm with normal S1/S2 without murmurs, rubs or gallops. Abdomen: Soft, non-tender, non-distended with normal bowel sounds. Musculoskeletal: No clubbing, cyanosis or edema bilaterally. Full range of motion without deformity. Skin: Skin color, texture, turgor normal.  No rashes or lesions. Neurologic:  Neurovascularly intact without any focal sensory/motor deficits. Cranial nerves: II-XII intact, grossly non-focal.  Psychiatric: Alert and oriented, thought content appropriate, normal insight  Capillary Refill: Brisk,3 seconds, normal   Peripheral Pulses: +2 palpable, equal bilaterally       Labs:   Recent Labs     07/08/22  2130 07/09/22  0600 07/11/22  0556   WBC 9.0 11.8* 16.6*   HGB 11.9* 11.6* 11.7*   HCT 36.0 34.8* 35.7*    213 223     Recent Labs     07/08/22  1516 07/11/22  0556    142   K 3.9 3.5    96*   CO2 29 25   BUN 22* 54*   CREATININE 1.1 1.7*   CALCIUM 9.4 8.8     Recent Labs     07/08/22  1516   AST 18   ALT 14   BILITOT 0.3   ALKPHOS 77     No results for input(s): INR in the last 72 hours. Recent Labs     07/08/22  1516   TROPONINI 0.04*       Urinalysis:      Lab Results   Component Value Date/Time    NITRU Negative 09/09/2021 11:52 AM    WBCUA 1 09/09/2021 11:52 AM    BACTERIA 1+ 09/24/2020 06:30 PM    RBCUA 1 09/09/2021 11:52 AM    BLOODU TRACE 09/09/2021 11:52 AM    SPECGRAV 1.010 09/09/2021 11:52 AM    GLUCOSEU Negative 09/09/2021 11:52 AM       Radiology:  XR CHEST PORTABLE   Final Result   Increased lung markings bilaterally, may be related to mild pulmonary   vascular congestion versus bronchitis. Stable mild cardiomegaly.                  Assessment/Plan:    Active Hospital Problems    Diagnosis     SOB (shortness of breath) [R06.02]      Priority: Medium     Shortness of breath. For COPD exacerbation. Acute on chronic diastolic heart failure  last ECHO: 9/2021 EF 10% Grade 2 Diastolic Dysfunction, patient presented to emergency with shortness of breath that is likely related to both COPD exacerbation and diastolic heart failure. Patient only uses 2 L oxygen at nighttime. Weaned off oxygen  Plan.   -Continue prednisone  -Appreciate cardiology input  -Lasix held due to MARK  -Strict intake output  -Nebulizations  -Complete 5 days of doxycycline    MARK. Likely due to diuresis, Lasix held today. Atrial Fibrillation   - currently rate controlled  - continue Xarelto, Dofetilide and metoprolol   EP following     Essential (primary) hypertension   - monitor blood pressure  - continue home meds      Hyperlipidemia   - continue statin    DVT Prophylaxis: On Xarelto  Diet: ADULT DIET; Regular; Low Sodium (2 gm)  Code Status: Full Code    PT/OT Eval Status: Following    Dispo -possible DC tomorrow on oral Lasix,if Cr improves.      Sally Pendleton MD

## 2022-07-11 NOTE — PLAN OF CARE
Problem: Discharge Planning  Goal: Discharge to home or other facility with appropriate resources  Outcome: Progressing   Continuing to work with patient and health care team on discharge plan. Discharge instructions and medication management will be reviewed prior to discharge. Problem: Skin/Tissue Integrity  Goal: Absence of new skin breakdown  Description: 1. Monitor for areas of redness and/or skin breakdown  2. Assess vascular access sites hourly  3. Every 4-6 hours minimum:  Change oxygen saturation probe site  4. Every 4-6 hours:  If on nasal continuous positive airway pressure, respiratory therapy assess nares and determine need for appliance change or resting period. Outcome: Progressing   Skin assessment performed each shift per protocol. Patient turned and repositioned every two hours and prn with pillow support. Patient checked for incontence every two hours. Problem: Safety - Adult  Goal: Free from fall injury  Outcome: Progressing   Pt free from falls this shift. Fall precautions in place at all times. Call light always within reach. Pt able and agreeable to contact for safety appropriately.     Problem: ABCDS Injury Assessment  Goal: Absence of physical injury  Outcome: Progressing

## 2022-07-12 VITALS
BODY MASS INDEX: 38.96 KG/M2 | WEIGHT: 228.18 LBS | DIASTOLIC BLOOD PRESSURE: 78 MMHG | HEART RATE: 86 BPM | RESPIRATION RATE: 18 BRPM | SYSTOLIC BLOOD PRESSURE: 137 MMHG | TEMPERATURE: 97.9 F | OXYGEN SATURATION: 97 % | HEIGHT: 64 IN

## 2022-07-12 LAB
ANION GAP SERPL CALCULATED.3IONS-SCNC: 11 MMOL/L (ref 3–16)
ANISOCYTOSIS: ABNORMAL
BASOPHILS ABSOLUTE: 0 K/UL (ref 0–0.2)
BASOPHILS RELATIVE PERCENT: 0 %
BLOOD CULTURE, ROUTINE: NORMAL
BUN BLDV-MCNC: 50 MG/DL (ref 7–20)
CALCIUM SERPL-MCNC: 8.8 MG/DL (ref 8.3–10.6)
CHLORIDE BLD-SCNC: 97 MMOL/L (ref 99–110)
CO2: 33 MMOL/L (ref 21–32)
CREAT SERPL-MCNC: 1.7 MG/DL (ref 0.6–1.2)
CULTURE, BLOOD 2: NORMAL
EOSINOPHILS ABSOLUTE: 0 K/UL (ref 0–0.6)
EOSINOPHILS RELATIVE PERCENT: 0 %
GFR AFRICAN AMERICAN: 35
GFR NON-AFRICAN AMERICAN: 29
GLUCOSE BLD-MCNC: 95 MG/DL (ref 70–99)
HCT VFR BLD CALC: 38.4 % (ref 36–48)
HEMOGLOBIN: 12.5 G/DL (ref 12–16)
LYMPHOCYTES ABSOLUTE: 2.1 K/UL (ref 1–5.1)
LYMPHOCYTES RELATIVE PERCENT: 12 %
MAGNESIUM: 1.9 MG/DL (ref 1.8–2.4)
MCH RBC QN AUTO: 29 PG (ref 26–34)
MCHC RBC AUTO-ENTMCNC: 32.6 G/DL (ref 31–36)
MCV RBC AUTO: 88.9 FL (ref 80–100)
MONOCYTES ABSOLUTE: 1.5 K/UL (ref 0–1.3)
MONOCYTES RELATIVE PERCENT: 9 %
NEUTROPHILS ABSOLUTE: 13.5 K/UL (ref 1.7–7.7)
NEUTROPHILS RELATIVE PERCENT: 79 %
PDW BLD-RTO: 16.2 % (ref 12.4–15.4)
PLATELET # BLD: 226 K/UL (ref 135–450)
PLATELET SLIDE REVIEW: ADEQUATE
PMV BLD AUTO: 9.3 FL (ref 5–10.5)
POTASSIUM SERPL-SCNC: 3.4 MMOL/L (ref 3.5–5.1)
RBC # BLD: 4.32 M/UL (ref 4–5.2)
SLIDE REVIEW: ABNORMAL
SODIUM BLD-SCNC: 141 MMOL/L (ref 136–145)
WBC # BLD: 17.1 K/UL (ref 4–11)

## 2022-07-12 PROCEDURE — 83735 ASSAY OF MAGNESIUM: CPT

## 2022-07-12 PROCEDURE — 6360000002 HC RX W HCPCS: Performed by: NURSE PRACTITIONER

## 2022-07-12 PROCEDURE — 2580000003 HC RX 258: Performed by: HOSPITALIST

## 2022-07-12 PROCEDURE — 6370000000 HC RX 637 (ALT 250 FOR IP): Performed by: HOSPITALIST

## 2022-07-12 PROCEDURE — 99232 SBSQ HOSP IP/OBS MODERATE 35: CPT | Performed by: NURSE PRACTITIONER

## 2022-07-12 PROCEDURE — 80048 BASIC METABOLIC PNL TOTAL CA: CPT

## 2022-07-12 PROCEDURE — 97530 THERAPEUTIC ACTIVITIES: CPT

## 2022-07-12 PROCEDURE — 85025 COMPLETE CBC W/AUTO DIFF WBC: CPT

## 2022-07-12 PROCEDURE — 2500000003 HC RX 250 WO HCPCS: Performed by: PEDIATRICS

## 2022-07-12 PROCEDURE — 6370000000 HC RX 637 (ALT 250 FOR IP): Performed by: STUDENT IN AN ORGANIZED HEALTH CARE EDUCATION/TRAINING PROGRAM

## 2022-07-12 PROCEDURE — 6370000000 HC RX 637 (ALT 250 FOR IP): Performed by: NURSE PRACTITIONER

## 2022-07-12 PROCEDURE — 97110 THERAPEUTIC EXERCISES: CPT

## 2022-07-12 PROCEDURE — 2580000003 HC RX 258: Performed by: PEDIATRICS

## 2022-07-12 PROCEDURE — 36415 COLL VENOUS BLD VENIPUNCTURE: CPT

## 2022-07-12 PROCEDURE — 94760 N-INVAS EAR/PLS OXIMETRY 1: CPT

## 2022-07-12 RX ORDER — POTASSIUM CHLORIDE 20 MEQ/1
20 TABLET, EXTENDED RELEASE ORAL ONCE
Status: COMPLETED | OUTPATIENT
Start: 2022-07-12 | End: 2022-07-12

## 2022-07-12 RX ORDER — HYDRALAZINE HYDROCHLORIDE 10 MG/1
10 TABLET, FILM COATED ORAL EVERY 8 HOURS SCHEDULED
Qty: 90 TABLET | Refills: 0 | Status: ON HOLD | OUTPATIENT
Start: 2022-07-12 | End: 2022-11-03

## 2022-07-12 RX ORDER — PREDNISONE 20 MG/1
40 TABLET ORAL DAILY
Qty: 2 TABLET | Refills: 0 | Status: SHIPPED | OUTPATIENT
Start: 2022-07-13 | End: 2022-07-14

## 2022-07-12 RX ORDER — LANOLIN ALCOHOL/MO/W.PET/CERES
400 CREAM (GRAM) TOPICAL DAILY
Status: DISCONTINUED | OUTPATIENT
Start: 2022-07-12 | End: 2022-07-12 | Stop reason: HOSPADM

## 2022-07-12 RX ORDER — HYDRALAZINE HYDROCHLORIDE 20 MG/ML
10 INJECTION INTRAMUSCULAR; INTRAVENOUS ONCE
Status: DISCONTINUED | OUTPATIENT
Start: 2022-07-12 | End: 2022-07-12 | Stop reason: HOSPADM

## 2022-07-12 RX ORDER — DOXYCYCLINE HYCLATE 100 MG
100 TABLET ORAL 2 TIMES DAILY
Qty: 6 TABLET | Refills: 0 | Status: SHIPPED | OUTPATIENT
Start: 2022-07-12 | End: 2022-07-15

## 2022-07-12 RX ORDER — HYDRALAZINE HYDROCHLORIDE 25 MG/1
25 TABLET, FILM COATED ORAL EVERY 8 HOURS SCHEDULED
Qty: 90 TABLET | Refills: 3 | Status: SHIPPED | OUTPATIENT
Start: 2022-07-12 | End: 2022-07-12

## 2022-07-12 RX ORDER — HYDRALAZINE HYDROCHLORIDE 25 MG/1
25 TABLET, FILM COATED ORAL EVERY 8 HOURS SCHEDULED
Status: DISCONTINUED | OUTPATIENT
Start: 2022-07-12 | End: 2022-07-12 | Stop reason: HOSPADM

## 2022-07-12 RX ADMIN — Medication 400 MG: at 10:16

## 2022-07-12 RX ADMIN — POTASSIUM CHLORIDE 20 MEQ: 1500 TABLET, EXTENDED RELEASE ORAL at 12:18

## 2022-07-12 RX ADMIN — SODIUM CHLORIDE, PRESERVATIVE FREE 10 ML: 5 INJECTION INTRAVENOUS at 08:52

## 2022-07-12 RX ADMIN — PREDNISONE 40 MG: 20 TABLET ORAL at 08:39

## 2022-07-12 RX ADMIN — DOXYCYCLINE 100 MG: 100 INJECTION, POWDER, LYOPHILIZED, FOR SOLUTION INTRAVENOUS at 08:52

## 2022-07-12 RX ADMIN — HYDRALAZINE HYDROCHLORIDE 10 MG: 10 TABLET, FILM COATED ORAL at 06:43

## 2022-07-12 RX ADMIN — SODIUM CHLORIDE: 9 INJECTION, SOLUTION INTRAVENOUS at 08:46

## 2022-07-12 RX ADMIN — DOFETILIDE 250 MCG: 0.25 CAPSULE ORAL at 08:44

## 2022-07-12 RX ADMIN — POTASSIUM CHLORIDE 20 MEQ: 1500 TABLET, EXTENDED RELEASE ORAL at 10:16

## 2022-07-12 RX ADMIN — RIVAROXABAN 20 MG: 20 TABLET, FILM COATED ORAL at 08:38

## 2022-07-12 RX ADMIN — HYDRALAZINE HYDROCHLORIDE 10 MG: 10 TABLET, FILM COATED ORAL at 12:18

## 2022-07-12 RX ADMIN — ROSUVASTATIN CALCIUM 20 MG: 20 TABLET, FILM COATED ORAL at 08:39

## 2022-07-12 RX ADMIN — METOPROLOL SUCCINATE 25 MG: 25 TABLET, EXTENDED RELEASE ORAL at 08:39

## 2022-07-12 RX ADMIN — LISINOPRIL 20 MG: 20 TABLET ORAL at 08:38

## 2022-07-12 ASSESSMENT — PAIN SCALES - GENERAL
PAINLEVEL_OUTOF10: 0

## 2022-07-12 NOTE — PROGRESS NOTES
Judy   Daily Progress Note      Admit Date:  7/8/2022    CC: SOB    HPI:   Mehrdad Og is a 69 y.o. female with PMH CAD, AF s.p PVI ablation 10/2020- recurrent AF with DCCV 1/2021 and repeat AF ablation 2/2021 (failed flecaininde), HTN, aortic aneurysm, AAA s.p repair 3/2018 (Dr. Chin Ion, DENISE (intolerant to CPAP). Marcelina Bhakta stress>[facility] 3/2018 revealed  of RCA and non obstructive dz of LAD and LCX. Adm to [facility] 5/2022 for dofetilide loading, converted to NSR. Seen by Dr. Jenifer Duncan 6/24/2022 with increasing SOB. Underwent OP labs,CXR. Lasix increased for increased BNP. Presented to [facility] ED 7/8 with worsening SOB and admitted for acute on chronic diastolic heart failure. Started on IV lasix and has diuresed. Today she is sitting in the chair. She completed PT this AM and states she tolerated well. O2 weaned off yesterday, tolerating well. Overall, she says her SOB is improved. Denies CP, LH, dizziness, palpitations, syncope, orthopnea. Review of Systems:   General: Denies fever, chills  Skin: Denies skin changes, rash, itching, lesions.   HEENT: Denies headache, dizziness, vision changes, nosebleeds, sore throat, nasal drainage  RESP: Denies cough, sputum,wheeze, snoring  CARD: Denies palpitations,  murmur  GI:Denies nausea, vomiting, heartburn, loss of appetite, change in bowels  : Denies frequency, pain, incontinence, polyuria  VASC: Denies claudication, leg cramps, clots  MUSC/SKEL: Denies pain, stiffness, arthritis  PSYCH: Denies anxiety, depression, stress  NEURO: Denies numbness, tingling, weakness,change in mood or memory  HEME: Denies abn bruising, bleeding, anemia  ENDO: Denies intolerance to heat, cold, excessive thirst or hunger, hx thyroid disease    Objective:   BP (!) 182/98   Pulse 88   Temp 97.6 °F (36.4 °C) (Oral)   Resp 21   Ht 5' 4\" (1.626 m)   Wt 228 lb 2.8 oz (103.5 kg)   SpO2 95%   BMI 39.17 kg/m²           Intake/Output Summary (Last 24 hours) at 1.7*     GLUCOSE:   Recent Labs     07/11/22  0556 07/12/22  0458   GLUCOSE 126* 95     LIVER PROFILE:   Lab Results   Component Value Date/Time    AST 18 07/08/2022 03:16 PM    ALT 14 07/08/2022 03:16 PM    LIPASE 43.0 06/02/2015 01:41 AM    AMYLASE 34 10/02/2013 11:46 AM    LABALBU 3.8 07/08/2022 03:16 PM    BILIDIR <0.2 07/14/2015 07:24 AM    BILITOT 0.3 07/08/2022 03:16 PM    ALKPHOS 77 07/08/2022 03:16 PM     PT/INR:   Lab Results   Component Value Date/Time    PROTIME 26.0 02/15/2020 06:28 PM    INR 2.22 02/15/2020 06:28 PM    INR 1.00 03/21/2018 06:40 AM    INR 1.04 02/24/2018 11:44 AM     APTT:   Lab Results   Component Value Date/Time    APTT 37.2 02/15/2020 06:28 PM     Pro-BNP:    Lab Results   Component Value Date/Time    PROBNP 5,004 07/11/2022 05:56 AM    PROBNP 2,136 07/08/2022 03:16 PM    PROBNP 2,209 06/24/2022 03:15 PM     Parameters:   > 450 pg/mL under age 48  > 900 pg/mL ages 54-65  > 1800 pg/mL over age 76    ENZYMES:  Lab Results   Component Value Date/Time    TROPONINI 0.04 07/08/2022 03:16 PM    TROPONINI <0.01 09/09/2021 06:52 PM    TROPONINI 0.02 09/09/2021 04:05 PM     FASTING LIPID PANEL:  Lab Results   Component Value Date/Time    CHOL 107 09/10/2021 07:22 AM    HDL 56 09/10/2021 07:22 AM    HDL 38 09/09/2011 03:35 PM    LDLCALC 39 09/10/2021 07:22 AM    TRIG 59 09/10/2021 07:22 AM       Diagnostics:    EKG:   Normal sinus rhythm  Voltage criteria for left ventricular hypertrophy  Septal infarct (cited on or before 07-JAN-2018)  Prolonged QT  Abnormal ECG  When compared with ECG of 26-MAY-2022 10:01,  Questionable change in initial forces of Anterior leads  Voltage criteria for left ventricular hypertrophy is now Present  Confirmed by Conner, [address] [place] [address] ([id number]) on 7/9/2022 12:12:14 PM    LHC: ([facility]) 4/2017   of RCA chronic LAD 10-20% RA 4 PA24/9 16 wedge 9 LVEDP markedly elevated 30    Carotid US 10/2017 at [facility]:  1. Estimated diameter reduction of the right internal carotid artery is  less than 50%. 2.  Estimated diameter reduction of the left internal carotid artery is  50-69%. 3.  The bilateral common carotid arteries reveal no evidence of   significant stenosis. 4.  There is greater than 50% stenosis of the right external carotid  artery. 5.  There is no evidence of significant stenosis in the left external  carotid artery. 6.  The bilateral vertebral arteries are patent with antegrade flow. 7.  The bilateral subclavian arteries reveal no evidence of significant  stenosis. 8.  There has been no significant change from the previous study of  4/21/2017. Echo 1/9/2018:  Mild concentric left ventricular hypertrophy. Visually estimated ejection fraction of 65%. Mitral annular calcification. Mild left atrial dilation. Mild aortic stenosis. (mean gradients [id number])  The systolic pulmonary artery pressure (SPAP) estimated at 30 mmHg  (estimated RA pressure of 8 mmHg included). Kitkelton Liu 2/19/18   Summary    Abnormal study. There is a moderate sized, mild intensity, partially    reversible defect of the mid to apical anterior and mid anterolateral walls    which is consistent with ischemia. There is breast attenuation but stress    images appear worse.    Normal LV size and systolic function.    Overall findings represent an intermediate risk study     Cardiac Cath 3/5/18  OVERALL IMPRESSION  1.  Again, 100% proximal right coronary artery occlusion with left to right  collaterals. 2.  Mild disease of the distal left main trunk. 3.  20% to 30% ostial left anterior descending artery stenosis with  collaterals from LAD to the right coronary artery. 4.  30% to 40% stenosis of the proximal circumflex artery. 5.  Normal left ventricular systolic function with estimated EF of 55% to  60%.   6.  Slightly enlarged aortic root with no evidence of aortic stenosis or  regurgitation.     In view of the above findings, we will okay the patient for her upcoming  aortic aneurysm surgery from cardiac standpoint.     ECHO 1/2/20  There is moderate concentric left ventricular hypertrophy. Patient appears to be in atrial fibrillation. Ejection fraction is estimated to be 50-60%. Indeterminate diastolic function. Mild aortic regurgitation. Mild tricuspid regurgitation. Mild mitral regurgitation with mitral annular calcification     Right Heart Cath 2/28/2020  1.  _____ normal right cardiac pressure. 2.  Elevated pulmonary capillary wedge pressure with a mean pulmonary  capillary wedge of 29 mmHg. 3.  Normal cardiac output and indices. 4.  No oxygen step off to suggest ASD/PFO.   In view of the above findings, we will start the patient on a small dose  of diuretic therapy and see whether we need to adjust some of her  antihypertensive medicines or not.  Her findings are consistent with a  diastolic heart failure.     ECHO 9/10/2022  Summary   Mod conc. LVH with normal LV size & wall motion. EF is    60%. Diastolic   filling parameters suggest grade II diastolic dysfunction. The left atrium is severely dilated. Normal right ventricular size and function. Trivial mitral, aortic and tricuspid regurgitation. Assessment/Plan:    1.) Acute on chronic diastolic heart failure, Gr II, LVEF 60%: Improved, appears euvolemic. Now on RA and SOB at baseline. Her BUN/creat are elevated- held lasix. Elevated BNP may be from MARK- does not correlate with clinical S/S.  SOB multifactorial with COPD. NYHA Class III   Stage C  Diuretic: restart lasix 40mg po BID  BB, ACEi/ARB/ARNI, AA, SGLT2i not necessarily indicated for HFpEF. Cardiac Rehab for EF <35%: NA, EF 60%. 2gm Na diet, daily weight, 64 oz fluid restriction  Avoid NSAIDS and other nephrotoxic meds  [facility]: OP  HF RN referral for education    2.) Paroxysmal Atrial Fib: s/p atrial fib and L flutter ablations, failed flecainide, now on dofetilide. NSR w runs atrial tach - has been followed by EP last few days.   CHADSVASC-

## 2022-07-12 NOTE — PROGRESS NOTES
Call light and personal belongings within reach, room free of clutter. No s/s of distress or SOB observed. Pt with medications administered and tolerated well. Pt discharged to open, with her . All belongings were packed up by  and taken home. Pt with IV removed with scant amount of bleeding, dry dressing and tape applied. AVS reviewed with pt and  with concerns voiced and addressed prior to discharge. Pt blood pressure was able to be controlled with PO hydralazine prior to d/c and MD made aware. Htdralazine d/c order changed prior to d/c and pt was helped to retail pharmacy to  prescription prior to helping her to her car. Pt was helped to car via w/c without incident.

## 2022-07-12 NOTE — PLAN OF CARE
Problem: Discharge Planning  Goal: Discharge to home or other facility with appropriate resources  7/12/2022 1910 by Lan Jack RN  Outcome: Completed  7/12/2022 1352 by Lan Jack RN  Outcome: Progressing     Problem: Skin/Tissue Integrity  Goal: Absence of new skin breakdown  Description: 1. Monitor for areas of redness and/or skin breakdown  2. Assess vascular access sites hourly  3. Every 4-6 hours minimum:  Change oxygen saturation probe site  4. Every 4-6 hours:  If on nasal continuous positive airway pressure, respiratory therapy assess nares and determine need for appliance change or resting period.   7/12/2022 1910 by Lan Jack RN  Outcome: Completed  7/12/2022 1352 by Lan Jack RN  Outcome: Progressing     Problem: Safety - Adult  Goal: Free from fall injury  7/12/2022 1910 by Lan Jack RN  Outcome: Completed  7/12/2022 1352 by Lan Jack RN  Outcome: Progressing     Problem: ABCDS Injury Assessment  Goal: Absence of physical injury  Outcome: Completed     Problem: Respiratory - Adult  Goal: Achieves optimal ventilation and oxygenation  Outcome: Completed

## 2022-07-12 NOTE — PROGRESS NOTES
Occupational Therapy  Facility/Department: [facility]  Occupational Therapy Daily Treatment Note    Name: Abhishek Speaker  : 1945  MRN: [id number]  Date of Service: 2022    Discharge Recommendations:  Home with assist PRN  OT Equipment Recommendations  Equipment Needed: Yes  Mobility Devices: Veronique Inches: Rollator (4 Wheeled)  Other: pt would benefit from 4WW for energy conservation d/t need for rest breaks secondary to SOB. Patient Diagnosis(es): The primary encounter diagnosis was Dyspnea and respiratory abnormalities. Diagnoses of COPD exacerbation ([facility] [address] and Acute on chronic diastolic congestive heart failure ([facility] [address] [facility] were also pertinent to this visit. Past Medical History:  has a past medical history of AAA (abdominal aortic aneurysm) ([facility] [address] AAA (abdominal aortic aneurysm) without rupture (HCC), Atrial fibrillation ([facility] [address] CAD (coronary artery disease), CHF (congestive heart failure) ([facility] [address] [facility] COPD (chronic obstructive pulmonary disease) ([facility] [address] History of blood clots, Hyperlipidemia, and Hypertension. Past Surgical History:  has a past surgical history that includes Colonoscopy (07); Hysterectomy (); Appendectomy (); bladder suspension; Cataract removal; Tonsillectomy (as a child); tumor excision; Cholecystectomy (10/15/13); Colonoscopy (2017); joint replacement (2013); Abdominal aortic aneurysm repair; and Cardiac surgery. Treatment Diagnosis: impaired ADL/fxl mobility      Assessment   Performance deficits / Impairments: Decreased functional mobility ; Decreased endurance;Decreased high-level IADLs;Decreased balance;Decreased ADL status  Assessment: Pt making progress towards goals. Pt with improved fxl transfers with supervision, and completed fxl mobility with no AD and SBA. Pt participated in B UE therex to improve strength/endurance for ADLs. Pt declined ADLs, but anticipate pt would require overall SBA/supervision.  Cont per OT POC to address the above limitations and maximize pt's safety and independence. Anticipate pt sony be safe to return home with assist prn at d/c. Prognosis: Good;Fair  REQUIRES OT FOLLOW-UP: Yes  Activity Tolerance  Activity Tolerance: Patient Tolerated treatment well        Plan   Plan  Times per Week: 3-5  Specific Instructions for Next Treatment: energy conservation  Current Treatment Recommendations: Strengthening,ROM,Balance training,Functional mobility training,Endurance training,Pain management,Safety education & training,Patient/Caregiver education & training,Self-Care / ADL,Home management training     Restrictions  Restrictions/Precautions  Restrictions/Precautions: Fall Risk    Subjective   General  Chart Reviewed: Yes  Patient assessed for rehabilitation services?: Yes  Additional Pertinent Hx: 70 yo female admitted for SOB with COPD exacerbation. PMH: CHF, COPD, CAD, AAA, Afib  Family / Caregiver Present: Yes  Referring Practitioner: Lily Wagner MD  Diagnosis: COPD exacerbation  Subjective  Subjective: Pt met b/s for OT tx. Pt seated in recliner on arrival, agreeable to participate in therapy. Pt with no complaints.   General Comment  Comments: RN ok to see     Social/Functional History  Social/Functional History  Lives With: Spouse  Type of Home: Mobile home  Home Layout: One level  Home Access: Stairs to enter with rails  Entrance Stairs - Number of Steps: 1  Bathroom Shower/Tub:  (walk in tub with small step)  Bathroom Equipment: Built-in shower seat,Grab bars in shower  Home Equipment: Walker, rolling (pt would like a rollator with a seat)  Has the patient had two or more falls in the past year or any fall with injury in the past year?: No  ADL Assistance: Independent (pt can do I but  sometimes helps with socks/shoes)  Homemaking Assistance: Needs assistance  Homemaking Responsibilities: No  Ambulation Assistance: Independent  Transfer Assistance: Independent  Active : Yes  Occupation: Retired  Type of 3: UB bathing/dressing SUP  Short Term Goal 4: tolerate 3 min fxl standing task SUP  Short Term Goal 5: fxl tx and mobility with AE PRN with SUP  Patient Goals   Patient goals : being out with friends for [place]       Therapy Time   Individual Concurrent Group Co-treatment   Time In 1019         Time Out 2489         Minutes 26              This note to serve as OT d/c summary if pt is d/c-ed prior to next therapy session.       Lambert Gaytan, OTR/L [id number]

## 2022-07-12 NOTE — DISCHARGE INSTR - COC
Continuity of Care Form    Patient Name: Doe Oquendo   :  1945  MRN:  [id number]    [facility] date:  2022  Discharge date:  2022    Code Status Order: Full Code   Advance Directives:      Admitting Physician:  Ayaka Marshall MD  PCP: Jim Huang MD    Discharging Nurse: [facility] Unit/Room#: H0K-9480/4755-71  Discharging Unit Phone Number: [phone]    Emergency Contact:   Extended Emergency Contact Information  Primary Emergency Contact: Linda Perez  Address: [address] [facility], [address] Phone: [phone]  Relation: Spouse  Secondary Emergency Contact: [address] Phone: [phone]  Relation: Child    Past Surgical History:  Past Surgical History:   Procedure Laterality Date    ABDOMINAL AORTIC ANEURYSM REPAIR      Endovascular abdominal AA    APPENDECTOMY      incidental    BLADDER SUSPENSION      CARDIAC SURGERY      CATARACT REMOVAL      CHOLECYSTECTOMY  10/15/13    COLONOSCOPY  07    dr Michelle Arauz and check in 5 years. COLONOSCOPY  2017    ok dr arndt, repeat 5 years    HYSTERECTOMY ([facility])      for benign tumor.  just the uterus    JOINT REPLACEMENT  2013    right knee replacement    TONSILLECTOMY  as a child    TUMOR EXCISION      benign behind right ear about        Immunization History:   Immunization History   Administered Date(s) Administered    COVID-19, PFIZER GRAY top, DO NOT Dilute, (age 15 y+), IM, 30 mcg/0.3 mL 2022    COVID-19, PFIZER PURPLE top, DILUTE for use, (age 15 y+), 30mcg/0.3mL 2021, 2021, 10/20/2021    Influenza Vaccine, unspecified formulation 2018    Influenza, Quadv, IM, PF (6 mo and older Fluzone, Flulaval, Fluarix, and 3 yrs and older Afluria) 10/08/2020    Pneumococcal Conjugate 13-valent ([id number]) 2017    Pneumococcal Polysaccharide ([id number]) 2015       Active Problems:  Patient Active Problem List   Diagnosis Code    Primary hypertension I10    Hyperlipidemia E78.5    Coronary artery disease I25.10    Sleep apnea G47.30    History of DVT (deep vein thrombosis) Z86.718    Family history of DVT Z82.49    AAA (abdominal aortic aneurysm) without rupture ([facility]) I71.4    Pleural effusion, left J90    Pleural effusion J90    COPD exacerbation ([facility]) J44.1    Pelvic pain in female R10.2    Vitamin D deficiency E55.9    Other emphysema ([address] J43.8    Acute bronchitis J20.9    Acute respiratory failure with hypoxia ([facility]) J96.01    Abnormal chest x-ray R93.89    Atypical pneumonia J18.9    Atrial fibrillation with RVR ([facility]) I48.91    Chronic diastolic heart failure ([facility]) I50.32    PVD (peripheral vascular disease) ([facility]) I73.9    Abnormal nuclear stress test R94.39    AAA (abdominal aortic aneurysm) ([facility]) I71.4    PAF (paroxysmal atrial fibrillation) ([facility]) I48.0    Endoleak post (EVAR) endovascular aneurysm repair, sequela [id number]    Carotid artery stenosis without cerebral infarction, bilateral I65.23    History of repair of aneurysm of abdominal aorta using endovascular stent graft Z95.828    Atherosclerotic heart disease of native coronary artery with other forms of angina pectoris ([address] I25.118    Morbidly obese ([facility]) E66.01    COPD, severe ([address] J44.9    Hypertensive crisis I16.9    Acute on chronic congestive heart failure ([address] I50.9    Respiratory failure ([address] J96.90    Left atrial flutter by electrocardiogram ([address] I48.92    Persistent atrial fibrillation ([facility]) I48.19    A-fib ([address] I48.91    Obesity (BMI 30-39. 9) E66.9    Atrial fibrillation ([facility]) I48.91    SOB (shortness of breath) R06.02       Isolation/Infection:   Isolation            Contact          Unreconciled Outside Infections       Enable clinical decision support by reconciling outside information with the patient's chart.     .      Infection Onset Last Indicated Last Received Source    No mapped external infections found      . Unmapped Infections        MRSA 10/30/18 10/30/18 08/04/20 [facility]                   Patient Infection Status       Infection Onset Added Last Indicated Last Indicated By Review Planned Expiration Resolved Resolved By    None active    Resolved    COVID-19 (Rule Out) 09/24/20 09/24/20 09/24/20 COVID-19 (Ordered)   09/25/20 Eladio Toledo RN            Nurse Assessment:  Last Vital Signs: /78   Pulse 86   Temp 97.9 °F (36.6 °C) (Oral)   Resp 18   Ht 5' 4\" (1.626 m)   Wt 228 lb 2.8 oz (103.5 kg)   SpO2 97%   BMI 39.17 kg/m²     Last documented pain score (0-10 scale): Pain Level: 0  Last Weight:   Wt Readings from Last 1 Encounters:   07/12/22 228 lb 2.8 oz (103.5 kg)     Mental Status:  oriented and alert    IV Access:  - None    Nursing Mobility/ADLs:  Walking   Assisted  Transfer  Assisted  Bathing  Assisted  Dressing  Assisted  Toileting  Assisted  Feeding  Independent  Med Admin  Independent  Med Delivery   whole    Wound Care Documentation and Therapy:  Puncture 10/07/20 Groin Anterior;Right (Active)   Number of days: 864        Elimination:  Continence: Bowel: Yes  Bladder: Yes  Urinary Catheter: None   Colostomy/Ileostomy/Ileal Conduit: No       Date of Last BM: 7/12/2022    Intake/Output Summary (Last 24 hours) at 7/12/2022 1346  Last data filed at 7/12/2022 0830  Gross per 24 hour   Intake 2897.86 ml   Output 2600 ml   Net 297.86 ml     I/O last 3 completed shifts: In: 2617.9 [P.O.:1920; IV Piggyback:697.9]  Out: 3800 [Urine:3800]    Safety Concerns:     None    Impairments/Disabilities:      None    Nutrition Therapy:  Current Nutrition Therapy:   - Oral Diet:  Low Sodium (2gm)    Routes of Feeding: Oral  Liquids:  Thin Liquids  Daily Fluid Restriction: no  Last Modified Barium Swallow with Video (Video Swallowing Test): not done    Treatments at the Time of Hospital Discharge:   Respiratory Treatments: Albuterol, Budesodine  Oxygen Therapy:  Is not on oxygen at home  Ventilator:    - No ventilator support    Rehab Therapies: Physical Therapy and Occupational Therapy  Weight Bearing Status/Restrictions: No weight bearing restrictions  Other Medical Equipment (for information only, NOT a DME order):  walker  Other Treatments:     Patient's personal belongings (please select all that are sent with patient):  None    RN SIGNATURE:  Electronically signed by Abbi Romo RN on 7/12/22 at 2:50 PM EDT    CASE MANAGEMENT/SOCIAL WORK SECTION    Inpatient Status Date: ***    Readmission Risk Assessment Score:  Readmission Risk              Risk of Unplanned Readmission:  18           Discharging to Facility/ Agency   Name:   Address:  Phone:  Fax:    Dialysis Facility (if applicable)   Name:  Address:  Dialysis Schedule:  Phone:  Fax:    / signature: {Esignature:[id number]}    PHYSICIAN SECTION    Prognosis: Fair    Condition at Discharge: Stable    Rehab Potential (if transferring to Rehab): Fair    Recommended Labs or Other Treatments After Discharge:   - home pt and ot     Physician Certification: I certify the above information and transfer of Roe Gomes  is necessary for the continuing treatment of the diagnosis listed and that she requires Home Care for greater 30 days.      Update Admission H&P: No change in H&P    PHYSICIAN SIGNATURE:  Electronically signed by Pearlene Lesch, MD on 7/12/22 at 1:47 PM EDT

## 2022-07-12 NOTE — DISCHARGE SUMMARY
Hospital Medicine Discharge Summary    Patient ID: Tara Ripa      Patient's PCP: Kory Pitt MD    Admit Date: 7/8/2022     Discharge Date:   07/12/22      Admitting Provider: Karla Avalos MD     Discharge Provider: Adam Cordero MD     Discharge Diagnoses: Active Hospital Problems    Diagnosis     SOB (shortness of breath) [R06.02]      Priority: Medium       The patient was seen and examined on day of discharge and this discharge summary is in conjunction with any daily progress note from day of discharge. Hospital Course:     69 y. o. female with PMHx of A-fib, CHF, COPD, HLD and HTN presented to [facility] in transfer from [facility] emergency department for management of COPD and CHF.  Patient presented to [facility] emergency department with 1 week history of shortness of breath.  She has minimal sputum production.  She has been using her inhalers without much improvement.  She does use oxygen at night.     Admitted with acute on chronic diastolic heart failure exacerbation with an element of COPD exacerbation. Shortness of breath. For COPD exacerbation. Acute on chronic diastolic heart failure  last ECHO: 9/2021 EF 67% Grade 2 Diastolic Dysfunction, patient presented to emergency with shortness of breath that is likely related to both COPD exacerbation and diastolic heart failure.     Patient only uses 2 L oxygen at nighttime. Weaned off oxygen    MARK.   Stable, BMP end of the week  Will resume home home dose of Lasix 40 mg twice daily     Atrial Fibrillation   - currently rate controlled  - continue Xarelto, Dofetilide and metoprolol   EP following     Essential (primary) hypertension   - monitor blood pressure  - continue home meds      Hyperlipidemia   - continue statin      Physical Exam Performed:     /78   Pulse 86   Temp 97.9 °F (36.6 °C) (Oral)   Resp 18   Ht 5' 4\" (1.626 m)   Wt 228 lb 2.8 oz (103.5 kg)   SpO2 97%   BMI 39.17 kg/m²       General appearance:  No apparent distress, appears stated age and cooperative. HEENT:  Normal cephalic, atraumatic without obvious deformity. Pupils equal, round, and reactive to light. Extra ocular muscles intact. Conjunctivae/corneas clear. Neck: Supple, with full range of motion. No jugular venous distention. Trachea midline. Respiratory:  Normal respiratory effort. Clear to auscultation, bilaterally without Rales/Wheezes/Rhonchi. Cardiovascular:  Regular rate and rhythm with normal S1/S2 without murmurs, rubs or gallops. Abdomen: Soft, non-tender, non-distended with normal bowel sounds. Musculoskeletal:  No clubbing, cyanosis or edema bilaterally. Full range of motion without deformity. Skin: Skin color, texture, turgor normal.  No rashes or lesions. Neurologic:  Neurovascularly intact without any focal sensory/motor deficits. Cranial nerves: II-XII intact, grossly non-focal.  Psychiatric:  Alert and oriented, thought content appropriate, normal insight  Capillary Refill: Brisk,< 3 seconds   Peripheral Pulses: +2 palpable, equal bilaterally       Labs: For convenience and continuity at follow-up the following most recent labs are provided:      CBC:    Lab Results   Component Value Date/Time    WBC 17.1 07/12/2022 04:58 AM    HGB 12.5 07/12/2022 04:58 AM    HCT 38.4 07/12/2022 04:58 AM     07/12/2022 04:58 AM       Renal:    Lab Results   Component Value Date/Time     07/12/2022 04:58 AM    K 3.4 07/12/2022 04:58 AM    K 3.9 07/08/2022 03:16 PM    CL 97 07/12/2022 04:58 AM    CO2 33 07/12/2022 04:58 AM    BUN 50 07/12/2022 04:58 AM    CREATININE 1.7 07/12/2022 04:58 AM    CALCIUM 8.8 07/12/2022 04:58 AM    PHOS 5.2 07/07/2021 05:55 AM         Significant Diagnostic Studies    Radiology:   XR CHEST PORTABLE   Final Result   Increased lung markings bilaterally, may be related to mild pulmonary   vascular congestion versus bronchitis. Stable mild cardiomegaly.                 Consults:     IP CONSULT TO HEART FAILURE NURSE/COORDINATOR  IP CONSULT TO CARDIOLOGY  IP CONSULT TO HEART FAILURE NURSE/COORDINATOR    Disposition:  Home with home pt      Condition at Discharge: Stable    Discharge Instructions/Follow-up:    -Continue furosemide 40 mg p.o. twice daily  -BMP end of the week  -Follow-up with cardiology/PCP    Code Status:  Full Code     Activity: activity as tolerated    Diet: cardiac diet      Discharge Medications:     Current Discharge Medication List           Details   hydrALAZINE (APRESOLINE) 25 MG tablet Take 1 tablet by mouth every 8 hours  Qty: 90 tablet, Refills: 3      doxycycline hyclate (VIBRA-TABS) 100 MG tablet Take 1 tablet by mouth 2 times daily for 3 days  Qty: 6 tablet, Refills: 0      predniSONE (DELTASONE) 20 MG tablet Take 2 tablets by mouth daily for 1 day  Qty: 2 tablet, Refills: 0              Details   vitamin C (ASCORBIC ACID) 500 MG tablet Take 500 mg by mouth daily      metoprolol succinate (TOPROL XL) 25 MG extended release tablet TAKE ONE TABLET BY MOUTH DAILY  Qty: 90 tablet, Refills: 3      dofetilide (TIKOSYN) 250 MCG capsule Take 1 capsule by mouth every 12 hours  Qty: 60 capsule, Refills: 5      furosemide (LASIX) 40 MG tablet Take 1 tablet by mouth 2 times daily  Qty: 180 tablet, Refills: 3      lisinopril (PRINIVIL;ZESTRIL) 20 MG tablet Take 1 tablet by mouth daily  Qty: 30 tablet, Refills: 3    Associated Diagnoses: Essential hypertension      albuterol sulfate  (90 Base) MCG/ACT inhaler INHALE TWO PUFFS BY MOUTH EVERY 4 HOURS AS NEEDED FOR WHEEZING OR FOR SHORTNESS OF BREATH  Qty: 1 each, Refills: 3      rosuvastatin (CRESTOR) 20 MG tablet Take 1 tablet by mouth daily  Qty: 90 tablet, Refills: 2    Associated Diagnoses: Hyperlipidemia LDL goal <70      budesonide-formoterol (SYMBICORT) 160-4.5 MCG/ACT AERO Inhale 2 puffs into the lungs 2 times daily  Qty: 1 each, Refills: 5      rivaroxaban (XARELTO) 20 MG TABS tablet TAKE ONE TABLET BY MOUTH DAILY WITH BREAKFAST  Qty: 30 tablet, Refills: 11      clobetasol (TEMOVATE) 0.05 % cream Apply topically 2 times daily as needed for Rash Apply to rash between genital area and buttocks and around anus bid prn x up to 2 weeks -need at least 1 week break prior to restarting      triamcinolone (KENALOG) 0.1 % ointment Apply topically 2 times daily as needed for Rash Apply to rash between genital area and buttocks and around anus bid prn during breaks from clobetasol      albuterol (PROVENTIL) (2.5 MG/3ML) 0.083% nebulizer solution Take 3 mLs by nebulization every 4 hours as needed for Wheezing  Qty: 540 mL, Refills: 2    Associated Diagnoses: COPD, severe (HCC)      nitroGLYCERIN (NITROSTAT) 0.4 MG SL tablet Place 1 tablet under the tongue every 5 minutes as needed for Chest pain  Qty: 25 tablet, Refills: 1      Multiple Vitamins-Minerals (MULTI FOR HER 50+ PO) Take 1 tablet by mouth daily             Time Spent on discharge is more than 30 minutes in the examination, evaluation, counseling and review of medications and discharge plan. Signed:    Rafa Calzada MD   7/12/2022      Thank you Raimundo Lee MD for the opportunity to be involved in this patient's care. If you have any questions or concerns, please feel free to contact me at [phone].

## 2022-07-12 NOTE — PLAN OF CARE
Problem: Skin/Tissue Integrity  Goal: Absence of new skin breakdown  Description: 1. Monitor for areas of redness and/or skin breakdown  2. Assess vascular access sites hourly  3. Every 4-6 hours minimum:  Change oxygen saturation probe site  4. Every 4-6 hours:  If on nasal continuous positive airway pressure, respiratory therapy assess nares and determine need for appliance change or resting period.   Outcome: Progressing     Problem: Discharge Planning  Goal: Discharge to home or other facility with appropriate resources  Outcome: Progressing     Problem: Safety - Adult  Goal: Free from fall injury  Outcome: Progressing

## 2022-07-12 NOTE — CONSULTS
[facility]  HEART FAILURE PROGRAM      NAME:  Pinky Garcia RECORD NUMBER:  [id number]  AGE: 69 y.o.    GENDER: female  : 1945  TODAY'S DATE:  2022    Subjective:     VISIT TYPE: evaluation     ADMITTING PHYSICIAN:  Ayaka Marshall MD    PAST MEDICAL HISTORY        Diagnosis Date    AAA (abdominal aortic aneurysm) ([facility]     pt states it is 4cm    AAA (abdominal aortic aneurysm) without rupture ([facility] 2/10/2015    Atrial fibrillation ([facility]     CAD (coronary artery disease)     CHF (congestive heart failure) (HCC)     COPD (chronic obstructive pulmonary disease) ([facility]     History of blood clots     Hyperlipidemia     Hypertension        SOCIAL HISTORY    Social History     Tobacco Use    Smoking status: Former Smoker     Packs/day: 1.00     Years: 37.00     Pack years: 37.00     Types: Cigarettes     Start date: 1976     Quit date: 2013     Years since quittin.8    Smokeless tobacco: Never Used    Tobacco comment: E cigarette now and then   Vaping Use    Vaping Use: Some days    Substances: Nicotine   Substance Use Topics    Alcohol use: No    Drug use: No       ALLERGIES    Allergies   Allergen Reactions    Morphine Rash     rash       MEDICATIONS  Scheduled Meds:   magnesium oxide  400 mg Oral Daily    potassium chloride  20 mEq Oral Once    hydrALAZINE  10 mg Oral 3 times per day    ipratropium-albuterol  1 ampule Inhalation TID    dofetilide  250 mcg Oral Q12H    lisinopril  20 mg Oral Daily    metoprolol succinate  25 mg Oral Daily    rivaroxaban  20 mg Oral Daily with breakfast    rosuvastatin  20 mg Oral Daily    sodium chloride flush  5-40 mL IntraVENous 2 times per day    predniSONE  40 mg Oral Daily    doxycycline (VIBRAMYCIN) IV  100 mg IntraVENous Q12H       ADMIT DATE: 2022      Objective:     ADMISSION DIAGNOSIS:   SOB (shortness of breath) [R06.02]  Dyspnea and respiratory abnormalities [R06.00, R06.89]  COPD exacerbation ([facility] [J44.1]  Acute on chronic diastolic congestive heart failure (HCC) [I50.33]     BP (!) 198/88   Pulse 88   Temp 97.9 °F (36.6 °C) (Oral)   Resp 20   Ht 5' 4\" (1.626 m)   Wt 228 lb 2.8 oz (103.5 kg)   SpO2 96%   BMI 39.17 kg/m²     ADMIT:  Weight: 226 lb (102.5 kg)    TODAY: Weight: 228 lb 2.8 oz (103.5 kg)    Wt Readings from Last 10 Encounters:   07/12/22 228 lb 2.8 oz (103.5 kg)   07/08/22 226 lb (102.5 kg)   06/24/22 221 lb (100.2 kg)   06/16/22 232 lb (105.2 kg)   05/26/22 216 lb 7.9 oz (98.2 kg)   05/13/22 225 lb (102.1 kg)   05/05/22 223 lb (101.2 kg)   04/15/22 215 lb (97.5 kg)   04/12/22 224 lb (101.6 kg)   04/04/22 229 lb 9.6 oz (104.1 kg)          Intake/Output Summary (Last 24 hours) at 7/12/2022 1200  Last data filed at 7/12/2022 0617  Gross per 24 hour   Intake 2377.86 ml   Output 2600 ml   Net -222.14 ml       LABS  BMP:   Lab Results   Component Value Date/Time     07/12/2022 04:58 AM    K 3.4 07/12/2022 04:58 AM    K 3.9 07/08/2022 03:16 PM    CL 97 07/12/2022 04:58 AM    CO2 33 07/12/2022 04:58 AM    BUN 50 07/12/2022 04:58 AM    LABALBU 3.8 07/08/2022 03:16 PM    CREATININE 1.7 07/12/2022 04:58 AM    CALCIUM 8.8 07/12/2022 04:58 AM    GFRAA 35 07/12/2022 04:58 AM    GFRAA >60 06/25/2012 11:06 AM    LABGLOM 29 07/12/2022 04:58 AM    GLUCOSE 95 07/12/2022 04:58 AM    GLUCOSE 102 07/14/2016 01:03 PM     CBC:   Recent Labs     07/11/22  0556 07/12/22  0458   WBC 16.6* 17.1*   HGB 11.7* 12.5   HCT 35.7* 38.4   MCV 90.4 88.9    226     BNP: No results found for: BNP    ECHOCARDIOGRAM:   9/10/21  Summary   Mod conc. LVH with normal LV size & wall motion. EF is    60%. Diastolic   filling parameters suggest grade II diastolic dysfunction. The left atrium is severely dilated. Normal right ventricular size and function. Trivial mitral, aortic and tricuspid regurgitation.     Assessment:     CONSULTS:   IP CONSULT TO HEART FAILURE NURSE/COORDINATOR  IP CONSULT TO CARDIOLOGY  IP CONSULT TO HEART FAILURE NURSE/COORDINATOR    Patient has a CARDIOLOGY CONSULT: Yes- [facility] consulted, follows with Dr Reilly Lee       Patient taking an ACEI/ARB:  Yes- lisinopril        Patient taking a BETA BLOCKER:  Yes- toprol     SCALE AVAILABLE:  Yes - states has a nice digital Weight Watcher scale    EDUCATION STATUS: Patient   [x]  Provided both written and verbal education on Heart Failure signs/symptoms. [x]  Provided instructions on daily medications. [x]  Provided instructions to monitor and record weight daily. [x]  Provided instructions to call if weight increases 3 lbs in one day or 5 lbs in one week. [x]  Received verbal acknowledgment/understanding of Heart Failure related causes. [x]  Provided instructions on how to maintain a low sodium diet. []  Provided recommendations for smoking cessation programs  [x]  Provided recommendations on activity and exercise     [x]  Other:    HF RN consult noted, chart reviewed. Pt is being treated for acute on chronic diastolic HF. This is not a new diagnosis for pt. I have met with pt and her spouse, Linda, in the recent past. She follows with Dr Reilly Lee (last OV 6/24/22). Cardiology is consulted and following. I spoke with Ms Alberteen Kehr. She remembers are prior session. When asked how she is doing at home in regards to managing her HF she stated \"I could do better. I stopped weighing myself and need to get back at it. \" She has a working digital scale at home \"I have a nice one from [employer]. \" I encouraged her to write them down. I reviewed the 3/5 rule, S&S to monitor for, and who and when to call. I also went over the HF Zones in detail. She verbalized understanding. I inquired about how she is doing with her low Na, FR diet as in the past she would eat a lot of fast food to include [employer]. \"Now that is something I have been great with. I know longer eat out. I stopped buying all lunch meats.  I eat tuna, chicken, plain burger, bananas, pears and salads and fruit. I did drink more than I should. I don't drink alcohol but with it being hot out I am drinking more fluids than I should. \" I gave tips on how to manage a dry mouth and how to measure her 2 liters. Pt is compliant with all meds and her follow up appts. She is an active , used [facility] in [facility]. Denies any issues. Was appreciative of time spent. She is aware there will be a hospital follow up appt for HF on her dc paperwork. Bedside RN updated on above. CURRENT DIET: ADULT DIET; Regular; Low Sodium (2 gm)    EDUCATIONAL PACKETS PROVIDED- PRINTED FROM [employer]. Titles and material given:  Yes   []  What is Heart Failure? []  Heart Failure: Warning Signs of a Flare-Up  []  Heart Failure: Making Changes to Your Diet  []  Heart Failure: Medications to Help Your Heart   [x]  Other: [facility] HF education     PATIENT/CAREGIVER TEACHING:    Level of patient/caregiver understanding able to:   [x] Verbalize understanding   [] Demonstrate understanding       [] Teach back        [] Needs reinforcement     []  Other:      TEACHING TIME:  20 minutes       Plan:       DISCHARGE PLAN:  Placement for patient upon discharge: home with support of spouse Linda (cowboy hat & boots)   Hospice Care:  no  Code Status: Full Code  Discharge appointment scheduled: Yes     RECOMMENDATIONS:   [x]  Encourage to call Heart Failure Resource Line with any questions or concerns. [x]  Educate further Ms. Medellin on fluid restriction 48 oz- 64 oz during inpatient stay so she can understand how to measure intake at home. [x]  Continue to educate on S/S of Heart Failure. [x]  Emphasize daily weights, diet, and if changes, to call Heart Failure Resource Line  [x]  Other: Doesn't qualify for Cardiac Rehab.  [facility]            Electronically signed by Royal Shabazz, RN, BSN CHFN  on 7/12/2022 at 12:00 PM

## 2022-07-12 NOTE — CARE COORDINATION
DISCHARGE SUMMARY     DATE OF DISCHARGE: 07/12/2022    DISCHARGE DESTINATION: Home with spouse      TRANSPORTATION: Spouse to transport pt home. Pt decline home care services. COMMENTS: ANAYA spoke with Kierra with [facility]. Kierra is delivering the walker to this pt now. No other needs noted at this time.    CONNOR Saxena  [phone]  Electronically signed by Bhargavi Negro on 7/12/2022 at 2:21 PM

## 2022-07-13 ENCOUNTER — CARE COORDINATION (OUTPATIENT)
Dept: CASE MANAGEMENT | Age: 77
End: 2022-07-13

## 2022-07-13 NOTE — CARE COORDINATION
verbalized understanding. Patient given an opportunity to ask questions and does not have any further questions or concerns at this time. Were discharge instructions available to patient? Yes. Reviewed appropriate site of care based on symptoms and resources available to patient including: PCP  Specialist. The patient agrees to contact the PCP office for questions related to their healthcare. Medication reconciliation was NOT performed with patient. Pt declined at this time states it was inconvenient at this time. Reviewed and educated patient on any new and changed medications related to discharge diagnosis. CTN provided contact information. Plan for next call: symptom management-CHF s/s  self management-daily weight, BP checks  follow up appointment-pt to make a PCP appt      Care Transitions 24 Hour Call    Do you have a copy of your discharge instructions?: Yes  Do you have all of your prescriptions and are they filled?: Yes  Have you been contacted by a [address]?: No  Have you scheduled your follow up appointment?: Yes  How are you going to get to your appointment?: Car - drive self  Do you have support at home?: Partner/Spouse/SO  Do you feel like you have everything you need to keep you well at home?: Yes  Care Transitions Interventions         Follow Up  Future Appointments   Date Time Provider Kiana Puri   7/19/2022 11:20 AM CHRISTINE Arana CNP BL[facility]   7/22/2022  9:30 AM SCHEDULE, VASCULAR LAB [address] [place] SANDY/VISHNU [facility]   7/22/2022 11:15 AM [facility]   9/1/2022 12:40 PM Angelia Holter,  [facility]   12/12/2022  2:00 PM CHRISTINE Briceño CNP [facility]       Spoke to pt who states she is doing better today. Denies any cp, fever or chills. States she has some SOB but \"not much. \" Weight this am 219 lbs. Denies swelling to ankles or legs. S/s were reviewed with pt and the 3/5 rule was discussed.  Pt declined a med review as she stated \"this is inconvenient right now. \" Pt placed self in the green zone. Pt was able to  the three medications from the pharmacy and is aware of their uses. Encouraged pt to take BP and keep a log for PCP to review. Explained hospital added a new BP medication so went over s/s of low BP and when to call PCP. 135/74 and pulse 63 today. Encouraged pt to make a HFU with PCP. Pt to see Kimberley torres 7/19. Will continue to follow.      NIVIA BoltonN, RN   Care Transition Nurse  Mobile: [phone]

## 2022-07-15 DIAGNOSIS — I50.9 ACUTE ON CHRONIC CONGESTIVE HEART FAILURE, UNSPECIFIED HEART FAILURE TYPE (HCC): ICD-10-CM

## 2022-07-15 LAB
ANION GAP SERPL CALCULATED.3IONS-SCNC: 12 MMOL/L (ref 3–16)
BUN BLDV-MCNC: 45 MG/DL (ref 7–20)
CALCIUM SERPL-MCNC: 9.2 MG/DL (ref 8.3–10.6)
CHLORIDE BLD-SCNC: 93 MMOL/L (ref 99–110)
CO2: 33 MMOL/L (ref 21–32)
CREAT SERPL-MCNC: 1.5 MG/DL (ref 0.6–1.2)
GFR AFRICAN AMERICAN: 41
GFR NON-AFRICAN AMERICAN: 34
GLUCOSE BLD-MCNC: 85 MG/DL (ref 70–99)
MAGNESIUM: 2.2 MG/DL (ref 1.8–2.4)
POTASSIUM SERPL-SCNC: 4.1 MMOL/L (ref 3.5–5.1)
PRO-BNP: 2792 PG/ML (ref 0–449)
SODIUM BLD-SCNC: 138 MMOL/L (ref 136–145)

## 2022-07-18 ENCOUNTER — OFFICE VISIT (OUTPATIENT)
Dept: FAMILY MEDICINE CLINIC | Age: 77
End: 2022-07-18
Payer: MEDICARE

## 2022-07-18 VITALS
HEART RATE: 76 BPM | DIASTOLIC BLOOD PRESSURE: 78 MMHG | HEIGHT: 64 IN | WEIGHT: 229 LBS | BODY MASS INDEX: 39.09 KG/M2 | SYSTOLIC BLOOD PRESSURE: 148 MMHG | TEMPERATURE: 97 F

## 2022-07-18 DIAGNOSIS — Z09 HOSPITAL DISCHARGE FOLLOW-UP: Primary | ICD-10-CM

## 2022-07-18 DIAGNOSIS — I50.33 ACUTE ON CHRONIC DIASTOLIC CONGESTIVE HEART FAILURE (HCC): ICD-10-CM

## 2022-07-18 PROCEDURE — 1111F DSCHRG MED/CURRENT MED MERGE: CPT | Performed by: FAMILY MEDICINE

## 2022-07-18 PROCEDURE — 99212 OFFICE O/P EST SF 10 MIN: CPT | Performed by: FAMILY MEDICINE

## 2022-07-18 PROCEDURE — 1090F PRES/ABSN URINE INCON ASSESS: CPT | Performed by: FAMILY MEDICINE

## 2022-07-18 PROCEDURE — G8399 PT W/DXA RESULTS DOCUMENT: HCPCS | Performed by: FAMILY MEDICINE

## 2022-07-18 PROCEDURE — 1036F TOBACCO NON-USER: CPT | Performed by: FAMILY MEDICINE

## 2022-07-18 PROCEDURE — 1123F ACP DISCUSS/DSCN MKR DOCD: CPT | Performed by: FAMILY MEDICINE

## 2022-07-18 PROCEDURE — G8427 DOCREV CUR MEDS BY ELIG CLIN: HCPCS | Performed by: FAMILY MEDICINE

## 2022-07-18 PROCEDURE — G8417 CALC BMI ABV UP PARAM F/U: HCPCS | Performed by: FAMILY MEDICINE

## 2022-07-18 NOTE — PROGRESS NOTES
Subjective:      Patient ID: Naima Kumar is a 69 y.o. female. Chief Complaint   Patient presents with    Follow-Up from [facility] 7-8-2022        Patient presents with:   Follow-Up from Hospital: [facility] 7-8-2022    Here for the above  She is feeling better  She would like to have handicap parking     She is on the meds  She tells me they did remove fluid  She is here with   She has appt with the cardiology group tomorrow    No fever no cp sob is better    YOB: 1945    Date of Visit:  7/18/2022     -- Morphine -- Rash    --  rash    Current Outpatient Medications:  hydrALAZINE (APRESOLINE) 10 MG tablet, Take 1 tablet by mouth every 8 hours, Disp: 90 tablet, Rfl: 0  vitamin C (ASCORBIC ACID) 500 MG tablet, Take 500 mg by mouth daily, Disp: , Rfl:   metoprolol succinate (TOPROL XL) 25 MG extended release tablet, TAKE ONE TABLET BY MOUTH DAILY, Disp: 90 tablet, Rfl: 3  dofetilide (TIKOSYN) 250 MCG capsule, Take 1 capsule by mouth every 12 hours, Disp: 60 capsule, Rfl: 5  furosemide (LASIX) 40 MG tablet, Take 1 tablet by mouth 2 times daily, Disp: 180 tablet, Rfl: 3  lisinopril (PRINIVIL;ZESTRIL) 20 MG tablet, Take 1 tablet by mouth daily, Disp: 30 tablet, Rfl: 3  albuterol sulfate  (90 Base) MCG/ACT inhaler, INHALE TWO PUFFS BY MOUTH EVERY 4 HOURS AS NEEDED FOR WHEEZING OR FOR SHORTNESS OF BREATH, Disp: 1 each, Rfl: 3  rosuvastatin (CRESTOR) 20 MG tablet, Take 1 tablet by mouth daily, Disp: 90 tablet, Rfl: 2  budesonide-formoterol (SYMBICORT) 160-4.5 MCG/ACT AERO, Inhale 2 puffs into the lungs 2 times daily, Disp: 1 each, Rfl: 5  rivaroxaban (XARELTO) 20 MG TABS tablet, TAKE ONE TABLET BY MOUTH DAILY WITH BREAKFAST, Disp: 30 tablet, Rfl: 11  clobetasol (TEMOVATE) 0.05 % cream, Apply topically 2 times daily as needed for Rash Apply to rash between genital area and buttocks and around anus bid prn x up to 2 weeks -need at least 1 week break prior to restarting, Disp: , Rfl:   triamcinolone (KENALOG) 0.1 % ointment, Apply topically 2 times daily as needed for Rash Apply to rash between genital area and buttocks and around anus bid prn during breaks from clobetasol, Disp: , Rfl:   albuterol (PROVENTIL) (2.5 MG/3ML) 0.083% nebulizer solution, Take 3 mLs by nebulization every 4 hours as needed for Wheezing, Disp: 540 mL, Rfl: 2  nitroGLYCERIN (NITROSTAT) 0.4 MG SL tablet, Place 1 tablet under the tongue every 5 minutes as needed for Chest pain, Disp: 25 tablet, Rfl: 1  Multiple Vitamins-Minerals (MULTI FOR HER 50+ PO), Take 1 tablet by mouth daily, Disp: , Rfl:     No current facility-administered medications for this visit.      ------------------------------------------               07/18/22 07/18/22                    1331            1332       ------------------------------------------   BP:        (!) 148/78      (!) 148/78     Site:    Left Upper Arm  Left Upper Arm   Position:     Sitting        Sitting       Cuff Size:   Large Adult    Large Adult     Pulse:                         76         Temp:    97 °F (36.1 °C)                  TempSrc:    Temporal                      Weight: 229 lb (103.9 kg)                 Height:  5' 4\" (1.626 m)                 ------------------------------------------  Body mass index is 39.31 kg/m². Wt Readings from Last 3 Encounters:  07/18/22 : 229 lb (103.9 kg)  07/12/22 : 228 lb 2.8 oz (103.5 kg)  07/08/22 : 226 lb (102.5 kg)    BP Readings from Last 3 Encounters:  07/18/22 : (!) 148/78  07/12/22 : 137/78  07/08/22 : (!) 170/86          Review of Systems    Objective:   Physical Exam  Constitutional:       General: She is not in acute distress. Appearance: Normal appearance. She is well-developed. She is not ill-appearing or diaphoretic. Cardiovascular:      Rate and Rhythm: Normal rate and regular rhythm. Heart sounds: Normal heart sounds. No murmur heard.     No friction rub. No gallop. Pulmonary:      Effort: Pulmonary effort is normal. No tachypnea, accessory muscle usage or respiratory distress. Breath sounds: Normal breath sounds. No decreased breath sounds, wheezing, rhonchi or rales. Chest:   Breasts:     Right: No supraclavicular adenopathy. Left: No supraclavicular adenopathy. Lymphadenopathy:      Cervical: No cervical adenopathy. Upper Body:      Right upper body: No supraclavicular adenopathy. Left upper body: No supraclavicular adenopathy. Skin:     General: Skin is warm and dry. Coloration: Skin is not pale. Neurological:      Mental Status: She is alert. Assessment:       Diagnosis Orders   1. Hospital discharge follow-up        2.  Acute on chronic diastolic congestive heart failure ([address]            Improved  She will get parking placard      Plan:      Do follow up with the cardiology group  Continue the medicines for now         Claudetta Mam, MD

## 2022-07-18 NOTE — PROGRESS NOTES
The [address] [place] [address], [address] [place] [address] [address] [facility], [place] [address]  [phone]    PrimaryCare Doctor:  Marlys Whitley MD  Primary Cardiologist:     Chief Complaint   Patient presents with    Follow-Up from [address] [place] [address] on exertion        History of Present Illness:  Raulito Heck is a 69 y.o. female with PMH CAD, AF s.p PVI ablation 10/2020- recurrent AF with DCCV 1/2021 and repeat AF ablation 2/2021 (failed flecaininde), HTN, aortic aneurysm, AAA s.p repair 3/2018 (Dr. Court Kan, DENISE (intolerant to CPAP). Oakley Barefoot stress>C 3/2018 revealed  of RCA and non obstructive dz of LAD and LCX. Adm to [facility] 5/2022 for dofetilide loading, converted to NSR. Seen by Dr. Terence Bruner 6/24/2022 with increasing SOB. Underwent OP labs,CXR. Lasix increased for increased BNP. Presented to [facility] ED 7/8 with worsening SOB and admitted for acute on chronic diastolic heart failure. Started on IV lasix and diuresed. Developed MARK. Diuresed 228 lbs  DC weight total 4L    Patient presents to [facility] cardiology for follow up for heart failure. Accompanied by her . States \"I need an oxygenator. \" She says she continues with SOB with exertion at home. She has checked her O2 sat at home and it was 89-90% with exertion. Improves with rest.  Overall activity tolerance is improved since hospitalization. Denies any wt gain - 222lbs at home. BLE improved - trace pedal/ankle. Denies CP, abd distention, palpitations, syncope. Review of Systems:   General: Denies fever, chills, +fatigue  Skin: Denies skin changes, rash, itching, lesions.   HEENT: Denies headache, dizziness, vision changes, nosebleeds, sore throat, nasal drainage  RESP: Denies cough, sputum, wheeze, snoring  CARD: Denies palpitations,  murmur  GI:Denies nausea, vomiting, heartburn, loss of appetite, change in bowels  : Denies frequency, pain, incontinence, polyuria  VASC: Denies claudication, leg cramps, clots  MUSC/SKEL: Denies pain, stiffness, arthritis  PSYCH: Denies anxiety, depression, stress  NEURO: Denies numbness, tingling, weakness,change in mood or memory  HEME: Denies abn bruising, bleeding, anemia  ENDO: Denies intolerance to heat, cold, excessive thirst or hunger, hx thyroid disease    /74   Pulse 72   Ht 5' 4\" (1.626 m)   Wt 226 lb 9.6 oz (102.8 kg)   SpO2 97%   BMI 38.90 kg/m²   Wt Readings from Last 3 Encounters:   07/19/22 226 lb 9.6 oz (102.8 kg)   07/18/22 229 lb (103.9 kg)   07/12/22 228 lb 2.8 oz (103.5 kg)       Physical Exam:  GEN: Appears obese, no acute distress  SKIN: Pink, warm, dry. Nails without clubbing. HEENT: PERRLA. Normocephalic, atraumatic. Neck supple. No adenopathy. LUNG: AP diameter normal. Diminished bilateral bases. No wheeze, rales, or ronchi. Respiratory effort increased. HEART: S1S2 A/R. No JVD. No carotid bruit. No murmur, rub or gallop. ABD: Soft, nontender. +BS X 4 quads. No hepatomegaly. EXT: Radial and pedal pulses 2+ and symmetric. Without varicosities. Trace pedal/ankle edema. MUSCSKEL: Good ROM X4 extremities. No deformity. NEURO: A/O X3. Calm and cooperative. Past Medical History:   has a past medical history of AAA (abdominal aortic aneurysm) ([facility] AAA (abdominal aortic aneurysm) without rupture (HCC), Atrial fibrillation ([facility] CAD (coronary artery disease), CHF (congestive heart failure) ([facility] COPD (chronic obstructive pulmonary disease) ([facility] History of blood clots, Hyperlipidemia, and Hypertension. Surgical History:   has a past surgical history that includes Colonoscopy (9/2/07); Hysterectomy (1990); Appendectomy (1990); bladder suspension; Cataract removal; Tonsillectomy (as a child); tumor excision; Cholecystectomy (10/15/13); Colonoscopy (06/16/2017); joint replacement (12/2013); Abdominal aortic aneurysm repair; and Cardiac surgery. Social History:   reports that she quit smoking about 8 years ago. Her smoking use included cigarettes.  She started smoking ointment Apply topically 2 times daily as needed for Rash Apply to rash between genital area and buttocks and around anus bid prn during breaks from clobetasol 8/31/21 8/31/22 Yes Historical Provider, MD   albuterol (PROVENTIL) (2.5 MG/3ML) 0.083% nebulizer solution Take 3 mLs by nebulization every 4 hours as needed for Wheezing 7/8/21  Yes CHRISTINE Flynn - CNP   nitroGLYCERIN (NITROSTAT) 0.4 MG SL tablet Place 1 tablet under the tongue every 5 minutes as needed for Chest pain 3/5/19  Yes Christian Meier MD   Multiple Vitamins-Minerals (MULTI FOR HER 50+ PO) Take 1 tablet by mouth daily   Yes Historical Provider, MD        Allergies:  Morphine       LABS: Results reviewed with patient today.     CBC:   Lab Results   Component Value Date/Time    WBC 17.1 07/12/2022 04:58 AM    WBC 16.6 07/11/2022 05:56 AM    WBC 11.8 07/09/2022 06:00 AM    RBC 4.32 07/12/2022 04:58 AM    RBC 3.95 07/11/2022 05:56 AM    RBC 3.94 07/09/2022 06:00 AM    RBC 4.19 04/13/2017 12:56 PM    RBC 4.29 09/15/2016 02:09 PM    RBC 4.23 07/14/2016 01:20 PM    HGB 12.5 07/12/2022 04:58 AM    HGB 11.7 07/11/2022 05:56 AM    HGB 11.6 07/09/2022 06:00 AM    HCT 38.4 07/12/2022 04:58 AM    HCT 35.7 07/11/2022 05:56 AM    HCT 34.8 07/09/2022 06:00 AM    MCV 88.9 07/12/2022 04:58 AM    MCV 90.4 07/11/2022 05:56 AM    MCV 88.3 07/09/2022 06:00 AM    RDW 16.2 07/12/2022 04:58 AM    RDW 16.1 07/11/2022 05:56 AM    RDW 15.4 07/09/2022 06:00 AM     07/12/2022 04:58 AM     07/11/2022 05:56 AM     07/09/2022 06:00 AM     BMP:  Lab Results   Component Value Date/Time     07/15/2022 08:44 AM     07/12/2022 04:58 AM     07/11/2022 05:56 AM    K 4.1 07/15/2022 08:44 AM    K 3.4 07/12/2022 04:58 AM    K 3.5 07/11/2022 05:56 AM    K 3.9 07/08/2022 03:16 PM    K 4.2 09/09/2021 11:13 AM    K 4.9 02/25/2020 11:46 AM    CL 93 07/15/2022 08:44 AM    CL 97 07/12/2022 04:58 AM    CL 96 07/11/2022 05:56 AM    CO2 33 07/15/2022 08:44 AM CO2 33 07/12/2022 04:58 AM    CO2 25 07/11/2022 05:56 AM    PHOS 5.2 07/07/2021 05:55 AM    PHOS 3.8 09/24/2020 06:45 PM    PHOS 4.4 09/14/2020 12:28 PM    BUN 45 07/15/2022 08:44 AM    BUN 50 07/12/2022 04:58 AM    BUN 54 07/11/2022 05:56 AM    CREATININE 1.5 07/15/2022 08:44 AM    CREATININE 1.7 07/12/2022 04:58 AM    CREATININE 1.7 07/11/2022 05:56 AM     BNP:   Lab Results   Component Value Date/Time    PROBNP 2,792 07/15/2022 08:44 AM    PROBNP 5,004 07/11/2022 05:56 AM    PROBNP 2,136 07/08/2022 03:16 PM       Parameters:   > 450 pg/mL under age 48  > 900 pg/mL ages 54-65  > 1800 pg/mL over age 76    Iron Studies:    Lab Results   Component Value Date/Time    TIBC 275 07/14/2016 01:03 PM    TIBC 268 03/15/2016 12:44 PM    FERRITIN 140.8 07/14/2016 01:03 PM     GLUCOSE: No results for input(s): GLUCOSE in the last 72 hours. LIVER PROFILE:   Lab Results   Component Value Date/Time    AST 18 07/08/2022 03:16 PM    ALT 14 07/08/2022 03:16 PM    LIPASE 43.0 06/02/2015 01:41 AM    AMYLASE 34 10/02/2013 11:46 AM    LABALBU 3.8 07/08/2022 03:16 PM    BILIDIR <0.2 07/14/2015 07:24 AM    BILITOT 0.3 07/08/2022 03:16 PM    ALKPHOS 77 07/08/2022 03:16 PM     PT/INR:   Lab Results   Component Value Date/Time    PROTIME 26.0 02/15/2020 06:28 PM    INR 2.22 02/15/2020 06:28 PM     Cardiac Enzymes:  Lab Results   Component Value Date/Time    TROPONINI 0.04 07/08/2022 03:16 PM     FASTING LIPID PANEL:  Lab Results   Component Value Date/Time    CHOL 107 09/10/2021 07:22 AM    HDL 56 09/10/2021 07:22 AM    HDL 38 09/09/2011 03:35 PM    LDLCALC 39 09/10/2021 07:22 AM    TRIG 59 09/10/2021 07:22 AM     TSH:   Lab Results   Component Value Date/Time    TSH 0.66 12/05/2019 12:37 PM       Cardiac Imaging: Reports interpreted by me and reviewed with patient today. EKG: Today, NSR HR 66 - reviewed by me.        [facility]: ([address]) 4/2017   of RCA chronic LAD 10-20% RA 4 PA24/9 16 wedge 9 LVEDP markedly elevated 30     Carotid US 10/2017 at [facility]:  1. Estimated diameter reduction of the right internal carotid artery is  less than 50%. 2.  Estimated diameter reduction of the left internal carotid artery is  50-69%. 3.  The bilateral common carotid arteries reveal no evidence of   significant stenosis. 4.  There is greater than 50% stenosis of the right external carotid  artery. 5.  There is no evidence of significant stenosis in the left external  carotid artery. 6.  The bilateral vertebral arteries are patent with antegrade flow. 7.  The bilateral subclavian arteries reveal no evidence of significant  stenosis. 8.  There has been no significant change from the previous study of  4/21/2017. Echo 1/9/2018:  Mild concentric left ventricular hypertrophy. Visually estimated ejection fraction of 65%. Mitral annular calcification. Mild left atrial dilation. Mild aortic stenosis. (mean gradients 73KVFR)  The systolic pulmonary artery pressure (SPAP) estimated at 30 mmHg  (estimated RA pressure of 8 mmHg included). Lexiscan 2/19/18   Summary    Abnormal study. There is a moderate sized, mild intensity, partially    reversible defect of the mid to apical anterior and mid anterolateral walls    which is consistent with ischemia. There is breast attenuation but stress    images appear worse. Normal LV size and systolic function. Overall findings represent an intermediate risk study      Cardiac Cath 3/5/18  OVERALL IMPRESSION  1. Again, 100% proximal right coronary artery occlusion with left to right  collaterals. 2.  Mild disease of the distal left main trunk. 3.  20% to 30% ostial left anterior descending artery stenosis with  collaterals from LAD to the right coronary artery. 4.  30% to 40% stenosis of the proximal circumflex artery. 5.  Normal left ventricular systolic function with estimated EF of 55% to  60%. 6.  Slightly enlarged aortic root with no evidence of aortic stenosis or  regurgitation.      In view of the above findings, we will okay the patient for her upcoming  aortic aneurysm surgery from cardiac standpoint. ECHO 1/2/20  There is moderate concentric left ventricular hypertrophy. Patient appears to be in atrial fibrillation. Ejection fraction is estimated to be 50-60%. Indeterminate diastolic function. Mild aortic regurgitation. Mild tricuspid regurgitation. Mild mitral regurgitation with mitral annular calcification     Right Heart Cath 2/28/2020  1.  _____ normal right cardiac pressure. 2.  Elevated pulmonary capillary wedge pressure with a mean pulmonary  capillary wedge of 29 mmHg. 3.  Normal cardiac output and indices. 4.  No oxygen step off to suggest ASD/PFO. In view of the above findings, we will start the patient on a small dose  of diuretic therapy and see whether we need to adjust some of her  antihypertensive medicines or not. Her findings are consistent with a  diastolic heart failure. ECHO 9/10/2022  Summary   Mod conc. LVH with normal LV size & wall motion. EF is   60%. Diastolic   filling parameters suggest grade II diastolic dysfunction. The left atrium is severely dilated. Normal right ventricular size and function. Trivial mitral, aortic and tricuspid regurgitation. Assessment/Plan:     1.) Acute on chronic diastolic heart failure, Gr II, LVEF 60%: Improved, appears euvolemic. Now on RA and SOB at baseline. SOB multifactorial with COPD. Patient requesting O2 at home - will defer to PCP. Cont GDMT and check labs today. NYHA Class III              Stage C  Diuretic: lasix 40mg po BID  BB, ACEi/ARB/ARNI, AA, SGLT2i not necessarily indicated for HFpEF. Cardiac Rehab for EF <35%: NA, EF 60%. 2gm Na diet, daily weight, 64 oz fluid restriction  Avoid NSAIDS and other nephrotoxic meds  [facility]: OP     2.) Paroxysmal Atrial Fib: s/p atrial fib and L flutter ablations, failed flecainide, now on dofetilide.   NSR per EKG today   CHADSVASC- HASBLED-  Thromboembolic risk reduction: xarelto  Rate Control/ Rhythm Control: tikosyn, toprol XL     3.) Elevated troponin: Mild, most likely from HF exacerbation. No evidence of ischemia. Continues with SOB w exertion. Will FU with Dr. Dimple De Guzman, may need to consider stress test.     4.) Hypertension:  Goal BP <130/80. Improved with hydralazine yesterday. Non pharmacologic interventions include:  -weight loss  -heart healthy low sodium and low fat diet that consist of mostly fruits, vegetables and grains (Dash diet)  -limited amount of alcohol (no more than 1 drink/day for women, 2 drinks/day for men)  -regular physical activity  -no smoking  -stress reduction     5.) MARK: BUNCreat 50/1.7 on discharge. OP recheck creat 1.5- most likely from diureses. Repeat today     6.) CAD: [facility] 2018  RCA with collaterals, mild dz LAD, LCX. Has been treated medically. Sob w exertion is concerning for angina. DAPT: She needs to start aspirin  Beta Blocker: toprol XL   ACEi/ARB:lisinopril  Anti anginal: Will start on imdur, she will FU w  in 1mos to evaluate improvement  Lipid management/high intensity statin: crestor  Risk factor management: high blood pressure, high cholesterol, Diabetes, smoking, obesity, family hx  Lifestyle modification: Heart healthy diet, regular exercise, weight loss, smoking cessation, stress reduction    Follow up 4 weeks with Dr. Dimple De Guzman    Instructions:   Daily weight: Call for increase 3 lbs/day or 5 lbs/week. 2 gm sodium diet:  Fluid Restriction: 64 oz.     I appreciate the opportunity of cooperating in the care of this individual.    SHARYN Howard, APRN - CNP, CNP, 7/19/2022,12:17 PM

## 2022-07-18 NOTE — LETTER
[facility] OF [facility] AND [facility]  [address] [address]  Phone: [phone]  Fax: [phone]    Christian Meier MD         July 18, 2022     Patient: Ivan Gallagher   YOB: 1945   Date of Visit: 7/18/2022       To Whom It May Concern: It is my medical opinion that Rashad Coburn requires a disability parking placard for the following reasons:  She cannot walk 200 feet without stopping to rest.  Duration of need: 5 years  (expires 7-)    If you have any questions or concerns, please don't hesitate to call.     Sincerely,        Christian Meier MD

## 2022-07-19 ENCOUNTER — OFFICE VISIT (OUTPATIENT)
Dept: CARDIOLOGY CLINIC | Age: 77
End: 2022-07-19
Payer: MEDICARE

## 2022-07-19 ENCOUNTER — TELEPHONE (OUTPATIENT)
Dept: CARDIOLOGY CLINIC | Age: 77
End: 2022-07-19

## 2022-07-19 VITALS
HEART RATE: 72 BPM | WEIGHT: 226.6 LBS | OXYGEN SATURATION: 97 % | BODY MASS INDEX: 38.68 KG/M2 | HEIGHT: 64 IN | SYSTOLIC BLOOD PRESSURE: 132 MMHG | DIASTOLIC BLOOD PRESSURE: 74 MMHG

## 2022-07-19 DIAGNOSIS — I48.19 PERSISTENT ATRIAL FIBRILLATION (HCC): Primary | ICD-10-CM

## 2022-07-19 DIAGNOSIS — I50.22 CHRONIC SYSTOLIC (CONGESTIVE) HEART FAILURE (HCC): ICD-10-CM

## 2022-07-19 DIAGNOSIS — I50.33 ACUTE ON CHRONIC DIASTOLIC CONGESTIVE HEART FAILURE (HCC): ICD-10-CM

## 2022-07-19 LAB
ANION GAP SERPL CALCULATED.3IONS-SCNC: 10 MMOL/L (ref 3–16)
BUN BLDV-MCNC: 29 MG/DL (ref 7–20)
CALCIUM SERPL-MCNC: 9.2 MG/DL (ref 8.3–10.6)
CHLORIDE BLD-SCNC: 97 MMOL/L (ref 99–110)
CO2: 32 MMOL/L (ref 21–32)
CREAT SERPL-MCNC: 1.3 MG/DL (ref 0.6–1.2)
GFR AFRICAN AMERICAN: 48
GFR NON-AFRICAN AMERICAN: 40
GLUCOSE BLD-MCNC: 106 MG/DL (ref 70–99)
POTASSIUM SERPL-SCNC: 4.5 MMOL/L (ref 3.5–5.1)
PRO-BNP: 3235 PG/ML (ref 0–449)
SODIUM BLD-SCNC: 139 MMOL/L (ref 136–145)

## 2022-07-19 PROCEDURE — 99214 OFFICE O/P EST MOD 30 MIN: CPT | Performed by: NURSE PRACTITIONER

## 2022-07-19 PROCEDURE — 93000 ELECTROCARDIOGRAM COMPLETE: CPT | Performed by: NURSE PRACTITIONER

## 2022-07-19 PROCEDURE — 1123F ACP DISCUSS/DSCN MKR DOCD: CPT | Performed by: NURSE PRACTITIONER

## 2022-07-19 NOTE — TELEPHONE ENCOUNTER
Rosa Shabazz was seen in office today. Discussed cardiomems PA sensor device and initial education provider. She is interested. Please submit for PA. Final decision will be made by Dr. Verena Penny- she is scheduled to FU in 1 month. Please add to visit notes.

## 2022-07-20 ENCOUNTER — CARE COORDINATION (OUTPATIENT)
Dept: CASE MANAGEMENT | Age: 77
End: 2022-07-20

## 2022-07-20 PROBLEM — I50.33 ACUTE ON CHRONIC DIASTOLIC CONGESTIVE HEART FAILURE (HCC): Status: ACTIVE | Noted: 2020-09-24

## 2022-07-20 RX ORDER — ASPIRIN 81 MG/1
81 TABLET ORAL DAILY
Qty: 90 TABLET | Refills: 3 | Status: ON HOLD | OUTPATIENT
Start: 2022-07-20 | End: 2022-11-03

## 2022-07-20 RX ORDER — ISOSORBIDE MONONITRATE 30 MG/1
30 TABLET, EXTENDED RELEASE ORAL DAILY
Qty: 90 TABLET | Refills: 3 | Status: ON HOLD | OUTPATIENT
Start: 2022-07-20

## 2022-07-20 NOTE — TELEPHONE ENCOUNTER
Intake form, letter of medical necessity faxed to [facility] to start PA process. Updated appt. Note. Will have fax confirmation/forms scanned into chart once received.

## 2022-07-20 NOTE — CARE COORDINATION
[facility] Transitions Follow Up Call    2022    Patient: Cheli Mota  Patient : 1945   MRN: [id number]  Reason for Admission: sob, chf exacerbation  Discharge Date: 22 RARS: Readmission Risk Score: 18.7 ( )         Spoke with: Cheli Mota who states she is doing well today. Denies cp but states she still has some sob. Pt did not check her weight this morning and states she forgot. Denies difficulty sleeping, increased sob, or ankle/leg swelling. Pt states both her PCP and cardiology appointment went well. Denies any medication changes. Reminded of appointment Friday with Dr. Cindi Natarajan. No questions or concerns. Will continue to follow. Care Transitions Follow Up Call    Needs to be reviewed by the provider   Additional needs identified to be addressed with provider: No  none             Method of communication with provider : none      Care Transition Nurse contacted the patient by telephone to follow up after admission. Verified name and  with patient as identifiers. Addressed changes since last contact:  follow up appt  Discussed follow-up appointments. If no appointment was previously scheduled, appointment scheduling offered: No.   Is follow up appointment scheduled within 7 days of discharge? Yes. CTN reviewed discharge instructions, medical action plan and red flags with patient and discussed any barriers to care and/or understanding of plan of care after discharge. Discussed appropriate site of care based on symptoms and resources available to patient including: PCP  Specialist. The patient agrees to contact the PCP office for questions related to their healthcare. Patients top risk factors for readmission: medical condition-CHF, COPD  Interventions to address risk factors: Obtained and reviewed discharge summary and/or continuity of care documents      Non-[facility] follow up appointment(s): n/a    CTN  provided contact information for future needs.  Plan for follow-up call in 5-7 days based on severity of symptoms and risk factors. Plan for next call: symptom management-daily weight, sob          Care Transitions Subsequent and Final Call    Subsequent and Final Calls  Do you have any ongoing symptoms?: Yes  Patient-reported symptoms: Shortness of Breath  Have your medications changed?: No  Do you have any questions related to your medications?: No  Do you currently have any active services?: No  Do you have any needs or concerns that I can assist you with?: No  Identified Barriers: None  Care Transitions Interventions  Other Interventions:              Follow Up  Future Appointments   Date Time Provider Kiana Puri   7/22/2022  9:30 AM SCHEDULE, VASCULAR LAB 1 [facility]   7/22/2022 11:15 AM Alessia Martínez MD [facility]   8/19/2022  1:00 PM Rosa Mendoza MD [facility]   9/1/2022 12:40 PM José Chambers DO [facility]   12/12/2022  2:00 PM CHRISTINE Duncan - CNP [facility]       PERFECTO Odonnell, RN   Care Transition Nurse  Mobile: [phone]

## 2022-07-21 ENCOUNTER — OFFICE VISIT (OUTPATIENT)
Dept: PULMONOLOGY | Age: 77
End: 2022-07-21
Payer: MEDICARE

## 2022-07-21 VITALS
BODY MASS INDEX: 38.72 KG/M2 | OXYGEN SATURATION: 92 % | HEART RATE: 79 BPM | HEIGHT: 64 IN | DIASTOLIC BLOOD PRESSURE: 82 MMHG | RESPIRATION RATE: 20 BRPM | TEMPERATURE: 95.7 F | WEIGHT: 226.8 LBS | SYSTOLIC BLOOD PRESSURE: 130 MMHG

## 2022-07-21 DIAGNOSIS — R06.02 SOB (SHORTNESS OF BREATH): ICD-10-CM

## 2022-07-21 DIAGNOSIS — J44.9 COPD, SEVERE (HCC): Primary | ICD-10-CM

## 2022-07-21 PROCEDURE — G8399 PT W/DXA RESULTS DOCUMENT: HCPCS | Performed by: INTERNAL MEDICINE

## 2022-07-21 PROCEDURE — 1090F PRES/ABSN URINE INCON ASSESS: CPT | Performed by: INTERNAL MEDICINE

## 2022-07-21 PROCEDURE — 99214 OFFICE O/P EST MOD 30 MIN: CPT | Performed by: INTERNAL MEDICINE

## 2022-07-21 PROCEDURE — 1111F DSCHRG MED/CURRENT MED MERGE: CPT | Performed by: INTERNAL MEDICINE

## 2022-07-21 PROCEDURE — G8427 DOCREV CUR MEDS BY ELIG CLIN: HCPCS | Performed by: INTERNAL MEDICINE

## 2022-07-21 PROCEDURE — 1123F ACP DISCUSS/DSCN MKR DOCD: CPT | Performed by: INTERNAL MEDICINE

## 2022-07-21 PROCEDURE — 3023F SPIROM DOC REV: CPT | Performed by: INTERNAL MEDICINE

## 2022-07-21 PROCEDURE — G8417 CALC BMI ABV UP PARAM F/U: HCPCS | Performed by: INTERNAL MEDICINE

## 2022-07-21 PROCEDURE — 1036F TOBACCO NON-USER: CPT | Performed by: INTERNAL MEDICINE

## 2022-07-21 RX ORDER — PREDNISONE 10 MG/1
TABLET ORAL
Qty: 30 TABLET | Refills: 0 | Status: SHIPPED | OUTPATIENT
Start: 2022-07-21 | End: 2022-08-05 | Stop reason: ALTCHOICE

## 2022-07-21 RX ORDER — BUDESONIDE AND FORMOTEROL FUMARATE DIHYDRATE 160; 4.5 UG/1; UG/1
2 AEROSOL RESPIRATORY (INHALATION) 2 TIMES DAILY
Qty: 1 EACH | Refills: 11 | Status: ON HOLD | OUTPATIENT
Start: 2022-07-21

## 2022-07-21 NOTE — PATIENT INSTRUCTIONS
Take prednisone    Resume symbicort twice a day.   Check with pharmacist if something is cheaper    Use albuterol    Walk test     Follow up in 3 months

## 2022-07-21 NOTE — PROGRESS NOTES
Chief complaint  This is a 69y.o. year old female  who comes to see me with a chief complaint of   Chief Complaint   Patient presents with    Follow-up       HPI  Here with CC of COPD    She was recently admitted for 4 days. Treated for both CHF and COPD exacerbation. Diuresis helped but also steroids helped too. Has been out ot symbicort for over one month. Too costly. On albuterol alone. Worries that she needs or would benefit from oxygen. Heat has affected her breathing too. Weight was up but down now. She is monitoring her weight daily etc.   is present. Per chart review she was weaned off of oxygen before discharge    Current Outpatient Medications   Medication Sig Dispense Refill    SYMBICORT 160-4.5 MCG/ACT AERO Inhale 2 puffs into the lungs in the morning and 2 puffs before bedtime. 1 each 11    predniSONE (DELTASONE) 10 MG tablet 40mg for 3days 30mg for 3days 20mg for 3days 10mg for 3days 30 tablet 0    aspirin EC 81 MG EC tablet Take 1 tablet by mouth in the morning. 90 tablet 3    isosorbide mononitrate (IMDUR) 30 MG extended release tablet Take 1 tablet by mouth in the morning.  . 90 tablet 3    hydrALAZINE (APRESOLINE) 10 MG tablet Take 1 tablet by mouth every 8 hours 90 tablet 0    vitamin C (ASCORBIC ACID) 500 MG tablet Take 500 mg by mouth daily      metoprolol succinate (TOPROL XL) 25 MG extended release tablet TAKE ONE TABLET BY MOUTH DAILY 90 tablet 3    dofetilide (TIKOSYN) 250 MCG capsule Take 1 capsule by mouth every 12 hours 60 capsule 5    furosemide (LASIX) 40 MG tablet Take 1 tablet by mouth 2 times daily 180 tablet 3    lisinopril (PRINIVIL;ZESTRIL) 20 MG tablet Take 1 tablet by mouth daily 30 tablet 3    albuterol sulfate  (90 Base) MCG/ACT inhaler INHALE TWO PUFFS BY MOUTH EVERY 4 HOURS AS NEEDED FOR WHEEZING OR FOR SHORTNESS OF BREATH 1 each 3    rosuvastatin (CRESTOR) 20 MG tablet Take 1 tablet by mouth daily 90 tablet 2    budesonide-formoterol (SYMBICORT) 160-4.5 MCG/ACT AERO Inhale 2 puffs into the lungs 2 times daily 1 each 5    rivaroxaban (XARELTO) 20 MG TABS tablet TAKE ONE TABLET BY MOUTH DAILY WITH BREAKFAST 30 tablet 11    clobetasol (TEMOVATE) 0.05 % cream Apply topically 2 times daily as needed for Rash Apply to rash between genital area and buttocks and around anus bid prn x up to 2 weeks -need at least 1 week break prior to restarting      triamcinolone (KENALOG) 0.1 % ointment Apply topically 2 times daily as needed for Rash Apply to rash between genital area and buttocks and around anus bid prn during breaks from clobetasol      albuterol (PROVENTIL) (2.5 MG/3ML) 0.083% nebulizer solution Take 3 mLs by nebulization every 4 hours as needed for Wheezing 540 mL 2    nitroGLYCERIN (NITROSTAT) 0.4 MG SL tablet Place 1 tablet under the tongue every 5 minutes as needed for Chest pain 25 tablet 1    Multiple Vitamins-Minerals (MULTI FOR HER 50+ PO) Take 1 tablet by mouth daily       No current facility-administered medications for this visit. PHYSICAL EXAM:  Vitals:    07/21/22 1130   BP: 130/82   Pulse: 79   Resp: 20   Temp: (!) 95.7 °F (35.4 °C)   SpO2: 92%       PE  GENERAL:  Well nourished, alert  HEENT:  No scleral icterus, no conjunctival irritation  NECK:  No thyromegaly, no bruits  LYMPH:  No cervical or supraclavicular adenopathy  HEART:  Regular rate and rhythm, no murmurs. Distant heart sounds   LUNGS:  Wheezing, diminished   ABDOMEN:  No distention, no organomegaly  EXTREMITIES: + edema, no digital clubbing  NEURO:  No localizing deficits, CN II-XII intact    Pulmonary Function Testing 7/2015  Severe obstruction with no response to bronchodilators    Moderate Restriction    Moderate Reduction in diffusing capacity     Chest imaging from 7/22is reviewed. My impression is suspicion of edema       6 MWT 9/2020  The patient ambulated 122 meters which is abnormal- 34-%   predicted.   Her, heart rate response was normal, the blood   pressure response was normal, oxygen saturations were normal.     The patient desaturated to 93% while ambulating on room air. The degree of symptoms based on the Danielle Dyspnea/Fatigue scale   were increased with testing. This test does not indicate the   need for supplemental oxygen. I reviewed the above labs and images    ABG 6/22:  7.45/45/72    Assessment/Plan:  1. COPD, severe ([address]  Not controlled. Repeat prednisone burst but warned her that her legs will likely swell. Resume symbicort but check with pharmacy for cheaper alternative. Nebs as needed. Walk test to assess for oxygen  - SYMBICORT 160-4.5 MCG/ACT AERO; Inhale 2 puffs into the lungs in the morning and 2 puffs before bedtime. Dispense: 1 each; Refill: 11  - predniSONE (DELTASONE) 10 MG tablet; 40mg for 3days 30mg for 3days 20mg for 3days 10mg for 3days  Dispense: 30 tablet; Refill: 0  - 6 Minute Walk Test; Future    2. SOB (shortness of breath)  Suspect combination of COPD and CHF. BNP is still high based on trends. Has severely dilated LA too.   Continue with diuresis etc.  Had really high wedge in the past.  Unclear if she would benefit from IV lasix as an outpatient from time to time  - 6 Minute Walk Test; Future    Pulmonary Rehab:  Politely declined    Lung Cancer Screening CT:  9/2021    Follow up in 3 months

## 2022-07-26 ENCOUNTER — APPOINTMENT (OUTPATIENT)
Dept: GENERAL RADIOLOGY | Age: 77
DRG: 871 | End: 2022-07-26
Payer: MEDICARE

## 2022-07-26 ENCOUNTER — HOSPITAL ENCOUNTER (INPATIENT)
Age: 77
LOS: 5 days | Discharge: HOME HEALTH | DRG: 871 | End: 2022-08-01
Attending: EMERGENCY MEDICINE | Admitting: STUDENT IN AN ORGANIZED HEALTH CARE EDUCATION/TRAINING PROGRAM
Payer: MEDICARE

## 2022-07-26 DIAGNOSIS — J96.21 ACUTE ON CHRONIC RESPIRATORY FAILURE WITH HYPOXIA (HCC): ICD-10-CM

## 2022-07-26 DIAGNOSIS — R06.02 SHORTNESS OF BREATH: Primary | ICD-10-CM

## 2022-07-26 DIAGNOSIS — J18.9 PNEUMONIA OF UPPER LOBE DUE TO INFECTIOUS ORGANISM, UNSPECIFIED LATERALITY: ICD-10-CM

## 2022-07-26 LAB
BASOPHILS ABSOLUTE: 0 K/UL (ref 0–0.2)
BASOPHILS RELATIVE PERCENT: 0.1 %
EOSINOPHILS ABSOLUTE: 0 K/UL (ref 0–0.6)
EOSINOPHILS RELATIVE PERCENT: 0 %
HCT VFR BLD CALC: 31.5 % (ref 36–48)
HEMOGLOBIN: 10.2 G/DL (ref 12–16)
LYMPHOCYTES ABSOLUTE: 0.5 K/UL (ref 1–5.1)
LYMPHOCYTES RELATIVE PERCENT: 2.3 %
MCH RBC QN AUTO: 29.1 PG (ref 26–34)
MCHC RBC AUTO-ENTMCNC: 32.2 G/DL (ref 31–36)
MCV RBC AUTO: 90.3 FL (ref 80–100)
MONOCYTES ABSOLUTE: 1.3 K/UL (ref 0–1.3)
MONOCYTES RELATIVE PERCENT: 6.1 %
NEUTROPHILS ABSOLUTE: 18.7 K/UL (ref 1.7–7.7)
NEUTROPHILS RELATIVE PERCENT: 91.5 %
PDW BLD-RTO: 15.6 % (ref 12.4–15.4)
PLATELET # BLD: 142 K/UL (ref 135–450)
PMV BLD AUTO: 9.2 FL (ref 5–10.5)
PRO-BNP: 4288 PG/ML (ref 0–449)
RBC # BLD: 3.49 M/UL (ref 4–5.2)
REASON FOR REJECTION: NORMAL
REJECTED TEST: NORMAL
SARS-COV-2, NAAT: NOT DETECTED
TROPONIN: <0.01 NG/ML
WBC # BLD: 20.5 K/UL (ref 4–11)

## 2022-07-26 PROCEDURE — 84484 ASSAY OF TROPONIN QUANT: CPT

## 2022-07-26 PROCEDURE — 99285 EMERGENCY DEPT VISIT HI MDM: CPT

## 2022-07-26 PROCEDURE — 87635 SARS-COV-2 COVID-19 AMP PRB: CPT

## 2022-07-26 PROCEDURE — 93005 ELECTROCARDIOGRAM TRACING: CPT | Performed by: EMERGENCY MEDICINE

## 2022-07-26 PROCEDURE — 71045 X-RAY EXAM CHEST 1 VIEW: CPT

## 2022-07-26 PROCEDURE — 85025 COMPLETE CBC W/AUTO DIFF WBC: CPT

## 2022-07-26 PROCEDURE — 96365 THER/PROPH/DIAG IV INF INIT: CPT

## 2022-07-26 PROCEDURE — 83880 ASSAY OF NATRIURETIC PEPTIDE: CPT

## 2022-07-26 PROCEDURE — 96367 TX/PROPH/DG ADDL SEQ IV INF: CPT

## 2022-07-26 PROCEDURE — 82803 BLOOD GASES ANY COMBINATION: CPT

## 2022-07-26 PROCEDURE — 36415 COLL VENOUS BLD VENIPUNCTURE: CPT

## 2022-07-26 PROCEDURE — 96375 TX/PRO/DX INJ NEW DRUG ADDON: CPT

## 2022-07-26 PROCEDURE — 83605 ASSAY OF LACTIC ACID: CPT

## 2022-07-26 RX ORDER — IPRATROPIUM BROMIDE AND ALBUTEROL SULFATE 2.5; .5 MG/3ML; MG/3ML
1 SOLUTION RESPIRATORY (INHALATION) ONCE
Status: COMPLETED | OUTPATIENT
Start: 2022-07-26 | End: 2022-07-27

## 2022-07-26 RX ORDER — METHYLPREDNISOLONE SODIUM SUCCINATE 125 MG/2ML
125 INJECTION, POWDER, LYOPHILIZED, FOR SOLUTION INTRAMUSCULAR; INTRAVENOUS ONCE
Status: COMPLETED | OUTPATIENT
Start: 2022-07-26 | End: 2022-07-27

## 2022-07-26 ASSESSMENT — ENCOUNTER SYMPTOMS
PHOTOPHOBIA: 0
COUGH: 1
VOMITING: 0
SHORTNESS OF BREATH: 1
TROUBLE SWALLOWING: 0
WHEEZING: 1
ABDOMINAL PAIN: 0
CHEST TIGHTNESS: 1
COLOR CHANGE: 0

## 2022-07-27 ENCOUNTER — CARE COORDINATION (OUTPATIENT)
Dept: CASE MANAGEMENT | Age: 77
End: 2022-07-27

## 2022-07-27 PROBLEM — J18.9 PNEUMONIA: Status: ACTIVE | Noted: 2022-07-27

## 2022-07-27 LAB
A/G RATIO: 1.1 (ref 1.1–2.2)
ALBUMIN SERPL-MCNC: 3.5 G/DL (ref 3.4–5)
ALP BLD-CCNC: 73 U/L (ref 40–129)
ALT SERPL-CCNC: 10 U/L (ref 10–40)
ANION GAP SERPL CALCULATED.3IONS-SCNC: 10 MMOL/L (ref 3–16)
ANION GAP SERPL CALCULATED.3IONS-SCNC: 9 MMOL/L (ref 3–16)
AST SERPL-CCNC: 11 U/L (ref 15–37)
BASE EXCESS VENOUS: 4.6 MMOL/L
BASOPHILS ABSOLUTE: 0 K/UL (ref 0–0.2)
BASOPHILS RELATIVE PERCENT: 0.1 %
BILIRUB SERPL-MCNC: 0.6 MG/DL (ref 0–1)
BUN BLDV-MCNC: 22 MG/DL (ref 7–20)
BUN BLDV-MCNC: 24 MG/DL (ref 7–20)
CALCIUM SERPL-MCNC: 8.9 MG/DL (ref 8.3–10.6)
CALCIUM SERPL-MCNC: 8.9 MG/DL (ref 8.3–10.6)
CARBOXYHEMOGLOBIN: 1.4 %
CHLORIDE BLD-SCNC: 96 MMOL/L (ref 99–110)
CHLORIDE BLD-SCNC: 98 MMOL/L (ref 99–110)
CO2: 29 MMOL/L (ref 21–32)
CO2: 30 MMOL/L (ref 21–32)
CREAT SERPL-MCNC: 1 MG/DL (ref 0.6–1.2)
CREAT SERPL-MCNC: 1.1 MG/DL (ref 0.6–1.2)
EOSINOPHILS ABSOLUTE: 0 K/UL (ref 0–0.6)
EOSINOPHILS RELATIVE PERCENT: 0 %
GFR AFRICAN AMERICAN: 58
GFR AFRICAN AMERICAN: >60
GFR NON-AFRICAN AMERICAN: 48
GFR NON-AFRICAN AMERICAN: 54
GLUCOSE BLD-MCNC: 140 MG/DL (ref 70–99)
GLUCOSE BLD-MCNC: 178 MG/DL (ref 70–99)
HCO3 VENOUS: 29 MMOL/L (ref 23–29)
HCT VFR BLD CALC: 31.2 % (ref 36–48)
HEMOGLOBIN: 10.2 G/DL (ref 12–16)
LACTIC ACID: 0.9 MMOL/L (ref 0.4–2)
LYMPHOCYTES ABSOLUTE: 0.4 K/UL (ref 1–5.1)
LYMPHOCYTES RELATIVE PERCENT: 2.2 %
MAGNESIUM: 2.2 MG/DL (ref 1.8–2.4)
MCH RBC QN AUTO: 29.4 PG (ref 26–34)
MCHC RBC AUTO-ENTMCNC: 32.8 G/DL (ref 31–36)
MCV RBC AUTO: 89.6 FL (ref 80–100)
METHEMOGLOBIN VENOUS: 0.3 %
MONOCYTES ABSOLUTE: 0.5 K/UL (ref 0–1.3)
MONOCYTES RELATIVE PERCENT: 2.8 %
NEUTROPHILS ABSOLUTE: 15.9 K/UL (ref 1.7–7.7)
NEUTROPHILS RELATIVE PERCENT: 94.9 %
O2 SAT, VEN: >100 %
O2 THERAPY: NORMAL
PCO2, VEN: 41.9 MMHG (ref 40–50)
PDW BLD-RTO: 15.9 % (ref 12.4–15.4)
PH VENOUS: 7.45 (ref 7.35–7.45)
PLATELET # BLD: 148 K/UL (ref 135–450)
PMV BLD AUTO: 9.3 FL (ref 5–10.5)
PO2, VEN: 148 MMHG
POTASSIUM SERPL-SCNC: 4.3 MMOL/L (ref 3.5–5.1)
POTASSIUM SERPL-SCNC: 4.7 MMOL/L (ref 3.5–5.1)
RBC # BLD: 3.49 M/UL (ref 4–5.2)
REASON FOR REJECTION: NORMAL
REASON FOR REJECTION: NORMAL
REJECTED TEST: NORMAL
REJECTED TEST: NORMAL
SODIUM BLD-SCNC: 136 MMOL/L (ref 136–145)
SODIUM BLD-SCNC: 136 MMOL/L (ref 136–145)
TCO2 CALC VENOUS: 30 MMOL/L
TOTAL PROTEIN: 6.6 G/DL (ref 6.4–8.2)
WBC # BLD: 16.8 K/UL (ref 4–11)

## 2022-07-27 PROCEDURE — 6370000000 HC RX 637 (ALT 250 FOR IP): Performed by: STUDENT IN AN ORGANIZED HEALTH CARE EDUCATION/TRAINING PROGRAM

## 2022-07-27 PROCEDURE — 1200000000 HC SEMI PRIVATE

## 2022-07-27 PROCEDURE — 6370000000 HC RX 637 (ALT 250 FOR IP): Performed by: EMERGENCY MEDICINE

## 2022-07-27 PROCEDURE — 82803 BLOOD GASES ANY COMBINATION: CPT

## 2022-07-27 PROCEDURE — 2580000003 HC RX 258: Performed by: STUDENT IN AN ORGANIZED HEALTH CARE EDUCATION/TRAINING PROGRAM

## 2022-07-27 PROCEDURE — 6360000002 HC RX W HCPCS: Performed by: INTERNAL MEDICINE

## 2022-07-27 PROCEDURE — 80053 COMPREHEN METABOLIC PANEL: CPT

## 2022-07-27 PROCEDURE — 83735 ASSAY OF MAGNESIUM: CPT

## 2022-07-27 PROCEDURE — 6370000000 HC RX 637 (ALT 250 FOR IP): Performed by: INTERNAL MEDICINE

## 2022-07-27 PROCEDURE — 2580000003 HC RX 258: Performed by: EMERGENCY MEDICINE

## 2022-07-27 PROCEDURE — 94669 MECHANICAL CHEST WALL OSCILL: CPT

## 2022-07-27 PROCEDURE — 85025 COMPLETE CBC W/AUTO DIFF WBC: CPT

## 2022-07-27 PROCEDURE — 2580000003 HC RX 258: Performed by: INTERNAL MEDICINE

## 2022-07-27 PROCEDURE — 36415 COLL VENOUS BLD VENIPUNCTURE: CPT

## 2022-07-27 PROCEDURE — 6360000002 HC RX W HCPCS: Performed by: EMERGENCY MEDICINE

## 2022-07-27 PROCEDURE — 94760 N-INVAS EAR/PLS OXIMETRY 1: CPT

## 2022-07-27 PROCEDURE — 6360000002 HC RX W HCPCS: Performed by: STUDENT IN AN ORGANIZED HEALTH CARE EDUCATION/TRAINING PROGRAM

## 2022-07-27 PROCEDURE — 2700000000 HC OXYGEN THERAPY PER DAY

## 2022-07-27 PROCEDURE — 94640 AIRWAY INHALATION TREATMENT: CPT

## 2022-07-27 RX ORDER — ISOSORBIDE MONONITRATE 30 MG/1
30 TABLET, EXTENDED RELEASE ORAL DAILY
Status: DISCONTINUED | OUTPATIENT
Start: 2022-07-27 | End: 2022-07-29

## 2022-07-27 RX ORDER — LISINOPRIL 10 MG/1
20 TABLET ORAL DAILY
Status: DISCONTINUED | OUTPATIENT
Start: 2022-07-27 | End: 2022-07-29

## 2022-07-27 RX ORDER — ACETAMINOPHEN 325 MG/1
650 TABLET ORAL EVERY 6 HOURS PRN
Status: DISCONTINUED | OUTPATIENT
Start: 2022-07-27 | End: 2022-08-01 | Stop reason: HOSPADM

## 2022-07-27 RX ORDER — DOFETILIDE 0.25 MG/1
250 CAPSULE ORAL EVERY 12 HOURS
Status: DISCONTINUED | OUTPATIENT
Start: 2022-07-27 | End: 2022-08-01 | Stop reason: HOSPADM

## 2022-07-27 RX ORDER — SODIUM CHLORIDE 9 MG/ML
INJECTION, SOLUTION INTRAVENOUS PRN
Status: DISCONTINUED | OUTPATIENT
Start: 2022-07-27 | End: 2022-08-01 | Stop reason: HOSPADM

## 2022-07-27 RX ORDER — HYDRALAZINE HYDROCHLORIDE 10 MG/1
10 TABLET, FILM COATED ORAL EVERY 8 HOURS SCHEDULED
Status: DISCONTINUED | OUTPATIENT
Start: 2022-07-27 | End: 2022-07-29

## 2022-07-27 RX ORDER — METOPROLOL SUCCINATE 25 MG/1
25 TABLET, EXTENDED RELEASE ORAL DAILY
Status: DISCONTINUED | OUTPATIENT
Start: 2022-07-27 | End: 2022-08-01 | Stop reason: HOSPADM

## 2022-07-27 RX ORDER — PREDNISONE 20 MG/1
40 TABLET ORAL DAILY
Status: COMPLETED | OUTPATIENT
Start: 2022-07-27 | End: 2022-07-31

## 2022-07-27 RX ORDER — LEVOFLOXACIN 5 MG/ML
750 INJECTION, SOLUTION INTRAVENOUS EVERY 24 HOURS
Status: DISCONTINUED | OUTPATIENT
Start: 2022-07-27 | End: 2022-07-27

## 2022-07-27 RX ORDER — POLYETHYLENE GLYCOL 3350 17 G/17G
17 POWDER, FOR SOLUTION ORAL DAILY PRN
Status: DISCONTINUED | OUTPATIENT
Start: 2022-07-27 | End: 2022-08-01 | Stop reason: HOSPADM

## 2022-07-27 RX ORDER — DOXYCYCLINE HYCLATE 100 MG
100 TABLET ORAL EVERY 12 HOURS SCHEDULED
Status: COMPLETED | OUTPATIENT
Start: 2022-07-27 | End: 2022-08-01

## 2022-07-27 RX ORDER — FUROSEMIDE 40 MG/1
40 TABLET ORAL 2 TIMES DAILY
Status: DISCONTINUED | OUTPATIENT
Start: 2022-07-27 | End: 2022-07-27

## 2022-07-27 RX ORDER — FUROSEMIDE 10 MG/ML
40 INJECTION INTRAMUSCULAR; INTRAVENOUS 2 TIMES DAILY
Status: DISCONTINUED | OUTPATIENT
Start: 2022-07-27 | End: 2022-07-28

## 2022-07-27 RX ORDER — FUROSEMIDE 10 MG/ML
40 INJECTION INTRAMUSCULAR; INTRAVENOUS ONCE
Status: COMPLETED | OUTPATIENT
Start: 2022-07-27 | End: 2022-07-27

## 2022-07-27 RX ORDER — ACETAMINOPHEN 650 MG/1
650 SUPPOSITORY RECTAL EVERY 6 HOURS PRN
Status: DISCONTINUED | OUTPATIENT
Start: 2022-07-27 | End: 2022-08-01 | Stop reason: HOSPADM

## 2022-07-27 RX ORDER — SODIUM CHLORIDE 0.9 % (FLUSH) 0.9 %
5-40 SYRINGE (ML) INJECTION EVERY 12 HOURS SCHEDULED
Status: DISCONTINUED | OUTPATIENT
Start: 2022-07-27 | End: 2022-08-01 | Stop reason: HOSPADM

## 2022-07-27 RX ORDER — SODIUM CHLORIDE 0.9 % (FLUSH) 0.9 %
5-40 SYRINGE (ML) INJECTION PRN
Status: DISCONTINUED | OUTPATIENT
Start: 2022-07-27 | End: 2022-08-01 | Stop reason: HOSPADM

## 2022-07-27 RX ORDER — ROSUVASTATIN CALCIUM 20 MG/1
20 TABLET, COATED ORAL DAILY
Status: DISCONTINUED | OUTPATIENT
Start: 2022-07-27 | End: 2022-08-01 | Stop reason: HOSPADM

## 2022-07-27 RX ADMIN — MOMETASONE FUROATE AND FORMOTEROL FUMARATE DIHYDRATE 2 PUFF: 200; 5 AEROSOL RESPIRATORY (INHALATION) at 08:19

## 2022-07-27 RX ADMIN — METOPROLOL SUCCINATE 25 MG: 25 TABLET, EXTENDED RELEASE ORAL at 08:42

## 2022-07-27 RX ADMIN — DOFETILIDE 250 MCG: 0.25 CAPSULE ORAL at 23:24

## 2022-07-27 RX ADMIN — FUROSEMIDE 40 MG: 40 TABLET ORAL at 08:43

## 2022-07-27 RX ADMIN — MOMETASONE FUROATE AND FORMOTEROL FUMARATE DIHYDRATE 2 PUFF: 200; 5 AEROSOL RESPIRATORY (INHALATION) at 20:16

## 2022-07-27 RX ADMIN — HYDRALAZINE HYDROCHLORIDE 10 MG: 10 TABLET, FILM COATED ORAL at 07:28

## 2022-07-27 RX ADMIN — IPRATROPIUM BROMIDE AND ALBUTEROL SULFATE 1 AMPULE: .5; 3 SOLUTION RESPIRATORY (INHALATION) at 00:05

## 2022-07-27 RX ADMIN — HYDRALAZINE HYDROCHLORIDE 10 MG: 10 TABLET, FILM COATED ORAL at 14:33

## 2022-07-27 RX ADMIN — PREDNISONE 40 MG: 20 TABLET ORAL at 14:33

## 2022-07-27 RX ADMIN — SODIUM CHLORIDE, PRESERVATIVE FREE 10 ML: 5 INJECTION INTRAVENOUS at 13:06

## 2022-07-27 RX ADMIN — RIVAROXABAN 15 MG: 15 TABLET, FILM COATED ORAL at 08:42

## 2022-07-27 RX ADMIN — FUROSEMIDE 40 MG: 10 INJECTION, SOLUTION INTRAMUSCULAR; INTRAVENOUS at 00:38

## 2022-07-27 RX ADMIN — METHYLPREDNISOLONE SODIUM SUCCINATE 125 MG: 125 INJECTION, POWDER, FOR SOLUTION INTRAMUSCULAR; INTRAVENOUS at 00:18

## 2022-07-27 RX ADMIN — CEFTRIAXONE 2000 MG: 2 INJECTION, POWDER, FOR SOLUTION INTRAMUSCULAR; INTRAVENOUS at 13:09

## 2022-07-27 RX ADMIN — VANCOMYCIN HYDROCHLORIDE 1500 MG: 10 INJECTION, POWDER, LYOPHILIZED, FOR SOLUTION INTRAVENOUS at 02:16

## 2022-07-27 RX ADMIN — SODIUM CHLORIDE, PRESERVATIVE FREE 10 ML: 5 INJECTION INTRAVENOUS at 20:09

## 2022-07-27 RX ADMIN — DOXYCYCLINE HYCLATE 100 MG: 100 TABLET, COATED ORAL at 20:08

## 2022-07-27 RX ADMIN — ISOSORBIDE MONONITRATE 30 MG: 30 TABLET, EXTENDED RELEASE ORAL at 08:42

## 2022-07-27 RX ADMIN — DOFETILIDE 250 MCG: 0.25 CAPSULE ORAL at 10:04

## 2022-07-27 RX ADMIN — ROSUVASTATIN CALCIUM 20 MG: 20 TABLET, FILM COATED ORAL at 10:03

## 2022-07-27 RX ADMIN — CEFTRIAXONE 2000 MG: 2 INJECTION, POWDER, FOR SOLUTION INTRAMUSCULAR; INTRAVENOUS at 20:08

## 2022-07-27 RX ADMIN — LISINOPRIL 20 MG: 10 TABLET ORAL at 08:42

## 2022-07-27 RX ADMIN — HYDRALAZINE HYDROCHLORIDE 10 MG: 10 TABLET, FILM COATED ORAL at 20:07

## 2022-07-27 RX ADMIN — FUROSEMIDE 40 MG: 10 INJECTION, SOLUTION INTRAMUSCULAR; INTRAVENOUS at 18:43

## 2022-07-27 RX ADMIN — CEFEPIME 2000 MG: 2 INJECTION, POWDER, FOR SOLUTION INTRAVENOUS at 00:41

## 2022-07-27 ASSESSMENT — PAIN SCALES - GENERAL: PAINLEVEL_OUTOF10: 0

## 2022-07-27 NOTE — H&P
Hospital Medicine History & Physical      PCP: Soni Sage MD    Date of Admission: 7/26/2022    Date of Service: Pt seen/examined on 07/27/22   and Admitted to Inpatient with expected LOS greater than two midnights due to medical therapy. Chief Complaint:    Chief Complaint   Patient presents with    Shortness of Breath         History Of Present Illness: 64-year-old female past medical history significant for atrial fibrillation, CHF, CAD daily, dyslipidemia presents to the emergency department complaints of shortness of breath and cough. Patient reports shortness of breath and cough for the last 3 days associated with weakness. Subjective fever and chill. Denies chest pain, lower extremity swelling. Past Medical History:          Diagnosis Date    AAA (abdominal aortic aneurysm) ([facility]     pt states it is 4cm    AAA (abdominal aortic aneurysm) without rupture (HCC) 2/10/2015    Atrial fibrillation (HCC)     CAD (coronary artery disease)     CHF (congestive heart failure) (HCC)     COPD (chronic obstructive pulmonary disease) ([facility] [address]     History of blood clots     Hyperlipidemia     Hypertension        Past Surgical History:          Procedure Laterality Date    ABDOMINAL AORTIC ANEURYSM REPAIR      Endovascular abdominal AA    APPENDECTOMY  1990    incidental    BLADDER SUSPENSION      CARDIAC SURGERY      CATARACT REMOVAL      CHOLECYSTECTOMY  10/15/13    COLONOSCOPY  9/2/07    dr [place] and check in 5 years. COLONOSCOPY  06/16/2017    ok dr arndt, repeat 5 years    HYSTERECTOMY ([facility])  1990    for benign tumor. just the uterus    JOINT REPLACEMENT  12/2013    right knee replacement    TONSILLECTOMY  as a child    TUMOR EXCISION      benign behind right ear about 2008       Medications Prior to Admission:      Prior to Admission medications    Medication Sig Start Date End Date Taking?  Authorizing Provider   SYMBICORT 160-4.5 MCG/ACT AERO Inhale 2 puffs into the lungs in the morning and 2 puffs before bedtime. 7/21/22   Tad Dears, DO   predniSONE (DELTASONE) 10 MG tablet 40mg for 3days 30mg for 3days 20mg for 3days 10mg for 3days 7/21/22   Tad Dears, DO   aspirin EC 81 MG EC tablet Take 1 tablet by mouth in the morning. 7/20/22   CHRISTINE Deshpande CNP   isosorbide mononitrate (IMDUR) 30 MG extended release tablet Take 1 tablet by mouth in the morning.  . 7/20/22   CHRISTINE Deshpande CNP   hydrALAZINE (APRESOLINE) 10 MG tablet Take 1 tablet by mouth every 8 hours 7/12/22   CHRISTINE Deshpande CNP   vitamin C (ASCORBIC ACID) 500 MG tablet Take 500 mg by mouth daily    Historical Provider, MD   metoprolol succinate (TOPROL XL) 25 MG extended release tablet TAKE ONE TABLET BY MOUTH DAILY 7/8/22   Dejuan Neville MD   dofetilide (TIKOSYN) 250 MCG capsule Take 1 capsule by mouth every 12 hours 6/27/22   Mason Fried MD   furosemide (LASIX) 40 MG tablet Take 1 tablet by mouth 2 times daily 6/24/22   Yumi Barrientos MD   lisinopril (PRINIVIL;ZESTRIL) 20 MG tablet Take 1 tablet by mouth daily 3/21/22   CHRISTINE Melendez CNP   albuterol sulfate  (90 Base) MCG/ACT inhaler INHALE TWO PUFFS BY MOUTH EVERY 4 HOURS AS NEEDED FOR WHEEZING OR FOR SHORTNESS OF BREATH 2/10/22   Tad Dears, DO   rosuvastatin (CRESTOR) 20 MG tablet Take 1 tablet by mouth daily 1/25/22   CHRISTINE Melendez CNP   rivaroxaban (XARELTO) 20 MG TABS tablet TAKE ONE TABLET BY MOUTH DAILY WITH BREAKFAST 10/20/21   Tad Dears,    albuterol (PROVENTIL) (2.5 MG/3ML) 0.083% nebulizer solution Take 3 mLs by nebulization every 4 hours as needed for Wheezing 7/8/21   CHRISTINE Manrique CNP   nitroGLYCERIN (NITROSTAT) 0.4 MG SL tablet Place 1 tablet under the tongue every 5 minutes as needed for Chest pain 3/5/19   Erin Erazo MD   Multiple Vitamins-Minerals (MULTI FOR HER 50+ PO) Take 1 tablet by mouth daily    Historical Provider, MD Allergies:  Morphine    Social History:      The patient currently lives home    TOBACCO:   reports that she quit smoking about 8 years ago. Her smoking use included cigarettes. She started smoking about 45 years ago. She has a 37.00 pack-year smoking history. She has never used smokeless tobacco.  ETOH:   reports no history of alcohol use. E-cigarette/Vaping       Questions Responses    E-cigarette/Vaping Use Current Some Day User    Start Date     Passive Exposure     Quit Date     Counseling Given Yes    Comments               Family History:       Reviewed and negative in regards to presenting illness/complaint. Problem Relation Age of Onset    Heart Disease Father     Hypertension Other        REVIEW OF SYSTEMS COMPLETED:   Pertinent positives as noted in the HPI. All other systems reviewed and negative. PHYSICAL EXAM PERFORMED:    BP (!) 155/63   Pulse 63   Temp 97.7 °F (36.5 °C) (Oral)   Resp 24   Ht 5' 4\" (1.626 m)   Wt 224 lb 10.4 oz (101.9 kg)   SpO2 99%   BMI 38.56 kg/m²     General appearance:  No apparent distress, appears stated age and cooperative. HEENT:  Normal cephalic, atraumatic without obvious deformity. Pupils equal, round, and reactive to light. Extra ocular muscles intact. Conjunctivae/corneas clear. Neck: Supple, with full range of motion. No jugular venous distention. Trachea midline. Respiratory: Diminished breath sounds bilaterally, crackles left lower base, scattered wheezing  Cardiovascular:  Regular rate and rhythm with normal S1/S2 without murmurs, rubs or gallops. Abdomen: Soft, non-tender, non-distended with normal bowel sounds. Musculoskeletal:  No clubbing, cyanosis or edema bilaterally. Full range of motion without deformity. Skin: Skin color, texture, turgor normal.  No rashes or lesions. Neurologic:  Neurovascularly intact without any focal sensory/motor deficits.  Cranial nerves: II-XII intact, grossly non-focal.  Psychiatric:  Alert and oriented, thought content appropriate, normal insight  Capillary Refill: Brisk,3 seconds, normal  Peripheral Pulses: +2 palpable, equal bilaterally       Labs:     Recent Labs     07/26/22  2336 07/27/22  1105   WBC 20.5* 16.8*   HGB 10.2* 10.2*   HCT 31.5* 31.2*    148     Recent Labs     07/27/22  0118 07/27/22  1105    136   K 4.7 4.3   CL 98* 96*   CO2 29 30   BUN 22* 24*   CREATININE 1.0 1.1   CALCIUM 8.9 8.9     Recent Labs     07/27/22  0118   AST 11*   ALT 10   BILITOT 0.6   ALKPHOS 73     No results for input(s): INR in the last 72 hours. Recent Labs     07/26/22  2257   TROPONINI <0.01       Urinalysis:      Lab Results   Component Value Date/Time    NITRU Negative 09/09/2021 11:52 AM    WBCUA 1 09/09/2021 11:52 AM    BACTERIA 1+ 09/24/2020 06:30 PM    RBCUA 1 09/09/2021 11:52 AM    BLOODU TRACE 09/09/2021 11:52 AM    SPECGRAV 1.010 09/09/2021 11:52 AM    GLUCOSEU Negative 09/09/2021 11:52 AM       Radiology:     CXR: I have reviewed the CXR with the following interpretation: left lower lobe infiltrate    XR CHEST PORTABLE   Final Result   New left suprahilar ground-glass opacity may represent developing   consolidation in the appropriate clinical setting. Consults:    PHARMACY TO DOSE VANCOMYCIN  IP CONSULT TO PHARMACY    ASSESSMENT:    Active Hospital Problems    Diagnosis Date Noted    Pneumonia [J18.9] 07/27/2022     Priority: Medium    Acute respiratory failure with hypoxia (HCC) [J96.01]        Acute on chronic  hypoxic respiratory failure  Sepsis due to Pneumonia    COPD exacerbation  Patient only uses 2 L oxygen at nighttime.    Chest x-ray with left lower lobe infiltrate  Ceftriaxone and doxycycline for 5 days  Schedule bronchodilators    Acute on chronic diastolic heart failure  last ECHO: 9/2021 EF 19% Grade 2 Diastolic Dysfunction  Appears to be fairly compensated   Continue IV lasix        Atrial Fibrillation   - currently rate controlled  - continue Xarelto, Dofetilide and metoprolol   EP following     Essential (primary) hypertension  - monitor blood pressure  - continue home meds     Hyperlipidemia   - continue statin    DVT Prophylaxis: Xarelto  Diet: ADULT DIET; Regular; 4 carb choices (60 gm/meal); Low Sodium (2 gm); 1500 ml  Code Status: Full Code    PT/OT Eval Status: Requested    Dispo -return home       Nafisa Nelson MD    Thank you Amy Mckeon MD for the opportunity to be involved in this patient's care. If you have any questions or concerns please feel free to contact me at [phone].

## 2022-07-27 NOTE — ED NOTES
Per lab green top is hemolyzed, but BNP and trop resulted. Phlebotomist to come and draw for patient.       Lexi Mccormick RN  07/27/22 0010

## 2022-07-27 NOTE — PROGRESS NOTES
Medication Reconciliation     List of medications patient is currently taking is complete. Source of information: 1. Conversation with patient at bedside                                      2. EPIC records         Notes regarding home medications:   1. Patient last received home meds 7/26 PTA  2.  Patient started prednisone taper 7/21 for COPD exacerbation and has been compliant; today's dose should be 20mg total    Jc Alanis, Pharmacy Intern  7/27/2022 10:50 AM

## 2022-07-27 NOTE — ED NOTES
Patient resting quietly in bed, respirations are even and easy. No distress noted. VSS.       Nighat Harmon RN  07/27/22 5617

## 2022-07-27 NOTE — ED NOTES
Patient placed on a hospital bed for comfort. Patient has had about 800 of urine out via pure wick post lasix. VSS. Patient resting in bed, respirations are even and easy, no distress noted.       Jarvis Son RN  07/27/22 4583

## 2022-07-27 NOTE — ED NOTES
Pasquale Chapin RN unable to take report, number provided for call back.       Flor Santos RN  07/27/22 3609

## 2022-07-27 NOTE — CONSULTS
Clinical Pharmacy Note  Vancomycin Consult    Pharmacy consult received for one-time dose of vancomycin in the Emergency Department per Dr. Tad Murphy. Ht Readings from Last 1 Encounters:   07/21/22 5' 4\" (1.626 m)        Wt Readings from Last 1 Encounters:   07/26/22 226 lb (102.5 kg)         Assessment/Plan:  Vancomycin 1500 mg x 1 in ED. If Vancomycin is to continue on admission and pharmacy is to manage dosing, please re-consult with admission orders.       MARCIO Brandt [facility]  7/27/2022 12:36 AM

## 2022-07-27 NOTE — PROGRESS NOTES
Patient arrives to room 4265 via stretcher. Patient is alert and oriented x 4. Denies shortness of breath. Oxygen saturation 97% on 3 liters NC. Patient denies pain or discomfort. Oriented to room and call light. Chair alarm active. Call light in reach. Spouse at bedside.

## 2022-07-27 NOTE — ED NOTES
Phlebotomist to come and stick patient for AM labs due to patient being a difficult stick.       Prosper Chang RN  07/27/22 1503

## 2022-07-27 NOTE — CONSULTS
Clinical Pharmacy Note  Vancomycin Consult    Richa He is a 69 y.o. female ordered Vancomycin for pneumonia; consult received from Dr. Arlene Huerta to manage therapy. Also receiving cefepime. Allergies:  Morphine     Temp max:  Temp (24hrs), Av.9 °F (36.6 °C), Min:97.7 °F (36.5 °C), Max:98 °F (36.7 °C)      Recent Labs     22  2336   WBC 20.5*       Recent Labs     22  0118   BUN 22*   CREATININE 1.0       No intake or output data in the 24 hours ending 22 9531    Culture Results:  Pending    Ht Readings from Last 1 Encounters:   22 5' 4\" (1.626 m)        Wt Readings from Last 1 Encounters:   22 226 lb (102.5 kg)         Estimated Creatinine Clearance: 56 mL/min (based on SCr of 1 mg/dL). Assessment/Plan:  Vancomycin 1500 mg IV every 18 hours ordered. Exposure target: AUC24 (range)400-600 mg/L.hr   AUC24,ss: 618 mg/L.hr  Probability of AUC24 > 400: 89 %  Ctrough,ss: 19.9 mg/L  Probability of Ctrough,ss > 20: 49 %  Probability of nephrotoxicity (Lodise AUBREE ): 17 %    Anticipate needing to adjust dose after a few doses. Trough level ordered for 1300 on 22 prior to the third dose. Thank you for the consult.      [facility] Le[facility], [address] [place] [address]  2022 6:16 AM

## 2022-07-27 NOTE — ED PROVIDER NOTES
[address] [facility]      Pt Name: Mal Eckert  MRN: [id number]  [place] 1945  Date ofevaluation: 7/26/2022  Provider: Marguerite Loredo MD    CHIEF COMPLAINT       Chief Complaint   Patient presents with    Shortness of Breath         HISTORY OF PRESENT ILLNESS   (Location/Symptom, Timing/Onset,Context/Setting, Quality, Duration, Modifying Factors, Severity)  Note limiting factors. Mal Eckert is a 69 y.o. female  who  has a past medical history of AAA (abdominal aortic aneurysm) ([facility] [address] AAA (abdominal aortic aneurysm) without rupture (HCC), Atrial fibrillation ([facility] CAD (coronary artery disease), CHF (congestive heart failure) ([facility] [address] COPD (chronic obstructive pulmonary disease) ([facility] History of blood clots, Hyperlipidemia, and Hypertension. who presents to the emergency department ration of shortness of breath. Patient reports progressive dyspnea that began this afternoon at rest.  She ports chest tightness, and nonproductive cough. Denies fevers or sick contacts. Denies weight gain or swelling of the lower extremities. Patient states that she does use an oxygen condenser at home, which was not prescribed to her that she borrowed from a friend. States she has been compliant with her medications. Patient states she does not smoke. States she has been fully vaccinated against COVID. Patient given breathing treatment in route by EMS. HPI    NursingNotes were reviewed. REVIEW OF SYSTEMS    (2-9 systems for level 4, 10 or more for level 5)     Review of Systems   Constitutional:  Negative for activity change, fatigue and fever. HENT:  Negative for congestion, mouth sores and trouble swallowing. Eyes:  Negative for photophobia and visual disturbance. Respiratory:  Positive for cough, chest tightness, shortness of breath and wheezing. Cardiovascular:  Negative for chest pain and palpitations.    Gastrointestinal:  Negative for abdominal pain and vomiting. Genitourinary:  Negative for difficulty urinating and frequency. Musculoskeletal:  Negative for gait problem and neck pain. Skin:  Negative for color change and rash. Neurological:  Negative for dizziness, light-headedness and headaches. Psychiatric/Behavioral:  Negative for confusion. The patient is not nervous/anxious. All other systems reviewed and are negative. Except as noted above the remainder of the review of systems was reviewed and negative. PAST MEDICAL HISTORY     Past Medical History:   Diagnosis Date    AAA (abdominal aortic aneurysm) ([facility] [address]     pt states it is 4cm    AAA (abdominal aortic aneurysm) without rupture (HCC) 2/10/2015    Atrial fibrillation (HCC)     CAD (coronary artery disease)     CHF (congestive heart failure) (HCC)     COPD (chronic obstructive pulmonary disease) ([facility] [address]     History of blood clots     Hyperlipidemia     Hypertension          SURGICALHISTORY       Past Surgical History:   Procedure Laterality Date    ABDOMINAL AORTIC ANEURYSM REPAIR      Endovascular abdominal AA    APPENDECTOMY  1990    incidental    BLADDER SUSPENSION      CARDIAC SURGERY      CATARACT REMOVAL      CHOLECYSTECTOMY  10/15/13    COLONOSCOPY  9/2/07    dr Hamzah Nguyen and check in 5 years. COLONOSCOPY  06/16/2017    ok dr arndt, repeat 5 years    HYSTERECTOMY ([facility])  1990    for benign tumor.  just the uterus    JOINT REPLACEMENT  12/2013    right knee replacement    TONSILLECTOMY  as a child    TUMOR EXCISION      benign behind right ear about 2008         CURRENT MEDICATIONS       Previous Medications    ALBUTEROL (PROVENTIL) (2.5 MG/3ML) 0.083% NEBULIZER SOLUTION    Take 3 mLs by nebulization every 4 hours as needed for Wheezing    ALBUTEROL SULFATE  (90 BASE) MCG/ACT INHALER    INHALE TWO PUFFS BY MOUTH EVERY 4 HOURS AS NEEDED FOR WHEEZING OR FOR SHORTNESS OF BREATH    ASPIRIN EC 81 MG EC TABLET    Take 1 tablet by mouth in the morning. BUDESONIDE-FORMOTEROL (SYMBICORT) 160-4.5 MCG/ACT AERO    Inhale 2 puffs into the lungs 2 times daily    CLOBETASOL (TEMOVATE) 0.05 % CREAM    Apply topically 2 times daily as needed for Rash Apply to rash between genital area and buttocks and around anus bid prn x up to 2 weeks -need at least 1 week break prior to restarting    DOFETILIDE (TIKOSYN) 250 MCG CAPSULE    Take 1 capsule by mouth every 12 hours    FUROSEMIDE (LASIX) 40 MG TABLET    Take 1 tablet by mouth 2 times daily    HYDRALAZINE (APRESOLINE) 10 MG TABLET    Take 1 tablet by mouth every 8 hours    ISOSORBIDE MONONITRATE (IMDUR) 30 MG EXTENDED RELEASE TABLET    Take 1 tablet by mouth in the morning. Robin Dhillon LISINOPRIL (PRINIVIL;ZESTRIL) 20 MG TABLET    Take 1 tablet by mouth daily    METOPROLOL SUCCINATE (TOPROL XL) 25 MG EXTENDED RELEASE TABLET    TAKE ONE TABLET BY MOUTH DAILY    MULTIPLE VITAMINS-MINERALS (MULTI FOR HER 50+ PO)    Take 1 tablet by mouth daily    NITROGLYCERIN (NITROSTAT) 0.4 MG SL TABLET    Place 1 tablet under the tongue every 5 minutes as needed for Chest pain    PREDNISONE (DELTASONE) 10 MG TABLET    40mg for 3days 30mg for 3days 20mg for 3days 10mg for 3days    RIVAROXABAN (XARELTO) 20 MG TABS TABLET    TAKE ONE TABLET BY MOUTH DAILY WITH BREAKFAST    ROSUVASTATIN (CRESTOR) 20 MG TABLET    Take 1 tablet by mouth daily    SYMBICORT 160-4.5 MCG/ACT AERO    Inhale 2 puffs into the lungs in the morning and 2 puffs before bedtime.     TRIAMCINOLONE (KENALOG) 0.1 % OINTMENT    Apply topically 2 times daily as needed for Rash Apply to rash between genital area and buttocks and around anus bid prn during breaks from clobetasol    VITAMIN C (ASCORBIC ACID) 500 MG TABLET    Take 500 mg by mouth daily            Morphine    FAMILY HISTORY       Family History   Problem Relation Age of Onset    Heart Disease Father     Hypertension Other           SOCIAL HISTORY       Social History     Socioeconomic History    Marital status:      Spouse name: None    Number of children: None    Years of education: None    Highest education level: None   Occupational History    Occupation: housewife   Tobacco Use    Smoking status: Former     Packs/day: 1.00     Years: 37.00     Pack years: 37.00     Types: Cigarettes     Start date: 1976     Quit date: 2013     Years since quittin.8    Smokeless tobacco: Never    Tobacco comments:     E cigarette now and then   Vaping Use    Vaping Use: Some days    Substances: Nicotine   Substance and Sexual Activity    Alcohol use: No    Drug use: No    Sexual activity: Yes     Partners: Male     Social Determinants of Health     Financial Resource Strain: Low Risk     Difficulty of Paying Living Expenses: Not hard at all   Food Insecurity: No Food Insecurity    Worried About [employer] in the Last Year: Never true    Ran Out of Food in the Last Year: Never true   Transportation Needs: No Transportation Needs    Lack of Transportation (Medical): No    Lack of Transportation (Non-Medical): No       SCREENINGS    [place] Coma Scale  Eye Opening: Spontaneous  Best Verbal Response: Oriented  Best Motor Response: Obeys commands  [place] Coma Scale Score: 15        PHYSICAL EXAM    (up to 7 for level 4, 8 or more for level 5)     ED Triage Vitals   BP Temp Temp src Heart Rate Resp SpO2 Height Weight   22 2214 22 -- 22 2212 22 22122 -- 22   (!) 197/82 98 °F (36.7 °C)  83 16 95 %  226 lb (102.5 kg)       Physical Exam  Vitals and nursing note reviewed. Constitutional:       Appearance: She is well-developed. HENT:      Head: Normocephalic and atraumatic. Mouth/Throat:      Mouth: Mucous membranes are moist.   Eyes:      Extraocular Movements: Extraocular movements intact. Conjunctiva/sclera: Conjunctivae normal.      Pupils: Pupils are equal, round, and reactive to light. Neck:      Trachea: No tracheal deviation. Cardiovascular:      Rate and Rhythm: Normal rate and regular rhythm. Heart sounds: Normal heart sounds. Pulmonary:      Effort: Pulmonary effort is normal. No accessory muscle usage or respiratory distress. Breath sounds: Decreased breath sounds and wheezing present. Abdominal:      General: There is no distension. Palpations: Abdomen is soft. Tenderness: There is no abdominal tenderness. Musculoskeletal:         General: Normal range of motion. Cervical back: Normal range of motion. Right lower leg: No edema. Left lower leg: No edema. Skin:     General: Skin is warm and dry. Capillary Refill: Capillary refill takes less than 2 seconds. Neurological:      Mental Status: She is alert. RESULTS     EKG: All EKG's are interpreted by the Emergency Department Physician who either signs or Co-signsthis chart in the absence of a cardiologist.    Natanael Montanez a sinus rhythm ventricular rate of 76 bpm.  NC interval is prolonged QTc interval within normal limits. Patient has normal axis. There are no significant ST elevations or depressions EKG is nondiagnostic for ACS. . . Compared EKG from 7/19/2022 and appreciate significant change. RADIOLOGY:   Non-plain filmimages such as CT, Ultrasound and MRI are read by the radiologist. Plain radiographic images are visualized and preliminarily interpreted by the emergency physician with the below findings:        Interpretation per the Radiologist below, if available at the time ofthis note:    XR CHEST PORTABLE    (Results Pending)         ED BEDSIDE ULTRASOUND:   Performed by ED Physician - none    LABS:  Labs Reviewed   COVID-19, RAPID   CBC WITH AUTO DIFFERENTIAL   COMPREHENSIVE METABOLIC PANEL   TROPONIN   LACTIC ACID   BLOOD GAS, VENOUS   BRAIN NATRIURETIC PEPTIDE       All other labs were within normal range or not returned as of this dictation.     EMERGENCY DEPARTMENT COURSE and DIFFERENTIAL DIAGNOSIS/MDM: Vitals:    Vitals:    07/26/22 2212 07/26/22 2214   BP:  (!) 197/82   Pulse: 83    Resp: 16    Temp: 98 °F (36.7 °C)    SpO2: 95%    Weight: 226 lb (102.5 kg)        Patient was given thefollowing medications:  Medications - No data to display    ED COURSE & MEDICAL DECISION MAKING    Pertinent Labs & Imaging studies reviewed. (See chart for details)   -  Patient seen and evaluated in the emergency department. -  Triage and nursing notes reviewed and incorporated. -  Old chart records reviewed and incorporated. -  Differential diagnosis includes: Differential Diagnosis: Acute Coronary Syndrome, Congestive Heart Failure, Myocardial Infarction, Pulmonary Embolus, Pneumonia, Pneumothorax, other    -  Work-up included:  See above  -  ED treatment included: See above  -  Results discussed with patient. Patient presents ED for evaluation of shortness of breath. labs show leukocytosis, patient currently on prednisone. . Imaging studies show new infiltrate concerning for pneumonia. Patient with recent admission was started on healthcare associated coverage. Patient also for COPD.  she will be admitted for further medical management. patient feels well on reevaluation. The patient is agreeable with plan of care and disposition. REASSESSMENT          CRITICAL CARE TIME   Total Critical Care time was 30 minutes, excluding separately reportable procedures. There was a high probability of clinically significant/life threatening deterioration in the patient's condition which required my urgent intervention. CONSULTS:  None    PROCEDURES:  Unless otherwise noted below, none     Procedures    FINAL IMPRESSION      1. Shortness of breath    2. Pneumonia of upper lobe due to infectious organism, unspecified laterality    3. Acute on chronic respiratory failure with hypoxia (HCC)          DISPOSITION/PLAN   DISPOSITION        PATIENT REFERREDTO:  No follow-up provider specified.     DISCHARGEMEDICATIONS:  Chivoe Decree Prescriptions    No medications on file          (Please note that portions of this note were completed with a voice recognition program.  Efforts were made to edit the dictations but occasionally words are mis-transcribed.)    Simona Mooney MD (electronically signed)  Attending Emergency Physician          Simona Mooney MD  08/02/22 0197

## 2022-07-28 LAB
ANION GAP SERPL CALCULATED.3IONS-SCNC: 10 MMOL/L (ref 3–16)
BASOPHILS ABSOLUTE: 0 K/UL (ref 0–0.2)
BASOPHILS RELATIVE PERCENT: 0.1 %
BUN BLDV-MCNC: 35 MG/DL (ref 7–20)
CALCIUM SERPL-MCNC: 9 MG/DL (ref 8.3–10.6)
CHLORIDE BLD-SCNC: 95 MMOL/L (ref 99–110)
CO2: 30 MMOL/L (ref 21–32)
CREAT SERPL-MCNC: 1.3 MG/DL (ref 0.6–1.2)
EKG ATRIAL RATE: 76 BPM
EKG DIAGNOSIS: NORMAL
EKG P AXIS: 74 DEGREES
EKG P-R INTERVAL: 186 MS
EKG Q-T INTERVAL: 390 MS
EKG QRS DURATION: 76 MS
EKG QTC CALCULATION (BAZETT): 438 MS
EKG R AXIS: -14 DEGREES
EKG T AXIS: 27 DEGREES
EKG VENTRICULAR RATE: 76 BPM
EOSINOPHILS ABSOLUTE: 0 K/UL (ref 0–0.6)
EOSINOPHILS RELATIVE PERCENT: 0 %
GFR AFRICAN AMERICAN: 48
GFR NON-AFRICAN AMERICAN: 40
GLUCOSE BLD-MCNC: 115 MG/DL (ref 70–99)
HCT VFR BLD CALC: 31.8 % (ref 36–48)
HEMOGLOBIN: 10.4 G/DL (ref 12–16)
LYMPHOCYTES ABSOLUTE: 1 K/UL (ref 1–5.1)
LYMPHOCYTES RELATIVE PERCENT: 6 %
MCH RBC QN AUTO: 29.2 PG (ref 26–34)
MCHC RBC AUTO-ENTMCNC: 32.7 G/DL (ref 31–36)
MCV RBC AUTO: 89.4 FL (ref 80–100)
MONOCYTES ABSOLUTE: 1.2 K/UL (ref 0–1.3)
MONOCYTES RELATIVE PERCENT: 6.7 %
NEUTROPHILS ABSOLUTE: 14.9 K/UL (ref 1.7–7.7)
NEUTROPHILS RELATIVE PERCENT: 87.2 %
PDW BLD-RTO: 15.7 % (ref 12.4–15.4)
PLATELET # BLD: 169 K/UL (ref 135–450)
PMV BLD AUTO: 9.6 FL (ref 5–10.5)
POTASSIUM SERPL-SCNC: 4.5 MMOL/L (ref 3.5–5.1)
RBC # BLD: 3.55 M/UL (ref 4–5.2)
SODIUM BLD-SCNC: 135 MMOL/L (ref 136–145)
WBC # BLD: 17.2 K/UL (ref 4–11)

## 2022-07-28 PROCEDURE — 94669 MECHANICAL CHEST WALL OSCILL: CPT

## 2022-07-28 PROCEDURE — 97116 GAIT TRAINING THERAPY: CPT

## 2022-07-28 PROCEDURE — 6370000000 HC RX 637 (ALT 250 FOR IP): Performed by: INTERNAL MEDICINE

## 2022-07-28 PROCEDURE — 94760 N-INVAS EAR/PLS OXIMETRY 1: CPT

## 2022-07-28 PROCEDURE — 6370000000 HC RX 637 (ALT 250 FOR IP): Performed by: STUDENT IN AN ORGANIZED HEALTH CARE EDUCATION/TRAINING PROGRAM

## 2022-07-28 PROCEDURE — 36415 COLL VENOUS BLD VENIPUNCTURE: CPT

## 2022-07-28 PROCEDURE — 2580000003 HC RX 258: Performed by: INTERNAL MEDICINE

## 2022-07-28 PROCEDURE — 94640 AIRWAY INHALATION TREATMENT: CPT

## 2022-07-28 PROCEDURE — 97162 PT EVAL MOD COMPLEX 30 MIN: CPT

## 2022-07-28 PROCEDURE — 2700000000 HC OXYGEN THERAPY PER DAY

## 2022-07-28 PROCEDURE — 1200000000 HC SEMI PRIVATE

## 2022-07-28 PROCEDURE — 97166 OT EVAL MOD COMPLEX 45 MIN: CPT

## 2022-07-28 PROCEDURE — 97530 THERAPEUTIC ACTIVITIES: CPT

## 2022-07-28 PROCEDURE — 80048 BASIC METABOLIC PNL TOTAL CA: CPT

## 2022-07-28 PROCEDURE — 93010 ELECTROCARDIOGRAM REPORT: CPT | Performed by: INTERNAL MEDICINE

## 2022-07-28 PROCEDURE — 2580000003 HC RX 258: Performed by: STUDENT IN AN ORGANIZED HEALTH CARE EDUCATION/TRAINING PROGRAM

## 2022-07-28 PROCEDURE — 6360000002 HC RX W HCPCS: Performed by: INTERNAL MEDICINE

## 2022-07-28 PROCEDURE — 85025 COMPLETE CBC W/AUTO DIFF WBC: CPT

## 2022-07-28 RX ORDER — LANOLIN ALCOHOL/MO/W.PET/CERES
6 CREAM (GRAM) TOPICAL ONCE
Status: COMPLETED | OUTPATIENT
Start: 2022-07-28 | End: 2022-07-28

## 2022-07-28 RX ORDER — FUROSEMIDE 20 MG/1
20 TABLET ORAL DAILY
Status: DISCONTINUED | OUTPATIENT
Start: 2022-07-29 | End: 2022-07-29

## 2022-07-28 RX ADMIN — FUROSEMIDE 40 MG: 10 INJECTION, SOLUTION INTRAMUSCULAR; INTRAVENOUS at 08:41

## 2022-07-28 RX ADMIN — HYDRALAZINE HYDROCHLORIDE 10 MG: 10 TABLET, FILM COATED ORAL at 06:02

## 2022-07-28 RX ADMIN — DOFETILIDE 250 MCG: 0.25 CAPSULE ORAL at 12:16

## 2022-07-28 RX ADMIN — SODIUM CHLORIDE, PRESERVATIVE FREE 10 ML: 5 INJECTION INTRAVENOUS at 09:08

## 2022-07-28 RX ADMIN — DOFETILIDE 250 MCG: 0.25 CAPSULE ORAL at 21:43

## 2022-07-28 RX ADMIN — SODIUM CHLORIDE, PRESERVATIVE FREE 10 ML: 5 INJECTION INTRAVENOUS at 21:38

## 2022-07-28 RX ADMIN — PREDNISONE 40 MG: 20 TABLET ORAL at 08:41

## 2022-07-28 RX ADMIN — CEFEPIME 2000 MG: 2 INJECTION, POWDER, FOR SOLUTION INTRAVENOUS at 17:27

## 2022-07-28 RX ADMIN — DOXYCYCLINE HYCLATE 100 MG: 100 TABLET, COATED ORAL at 08:41

## 2022-07-28 RX ADMIN — HYDRALAZINE HYDROCHLORIDE 10 MG: 10 TABLET, FILM COATED ORAL at 12:16

## 2022-07-28 RX ADMIN — ISOSORBIDE MONONITRATE 30 MG: 30 TABLET, EXTENDED RELEASE ORAL at 08:41

## 2022-07-28 RX ADMIN — HYDRALAZINE HYDROCHLORIDE 10 MG: 10 TABLET, FILM COATED ORAL at 21:36

## 2022-07-28 RX ADMIN — DOXYCYCLINE HYCLATE 100 MG: 100 TABLET, COATED ORAL at 21:36

## 2022-07-28 RX ADMIN — METOPROLOL SUCCINATE 25 MG: 25 TABLET, EXTENDED RELEASE ORAL at 08:41

## 2022-07-28 RX ADMIN — ROSUVASTATIN CALCIUM 20 MG: 20 TABLET, FILM COATED ORAL at 08:41

## 2022-07-28 RX ADMIN — LISINOPRIL 20 MG: 10 TABLET ORAL at 08:41

## 2022-07-28 RX ADMIN — RIVAROXABAN 15 MG: 15 TABLET, FILM COATED ORAL at 08:41

## 2022-07-28 RX ADMIN — Medication 6 MG: at 21:43

## 2022-07-28 RX ADMIN — MOMETASONE FUROATE AND FORMOTEROL FUMARATE DIHYDRATE 2 PUFF: 200; 5 AEROSOL RESPIRATORY (INHALATION) at 08:42

## 2022-07-28 RX ADMIN — MOMETASONE FUROATE AND FORMOTEROL FUMARATE DIHYDRATE 2 PUFF: 200; 5 AEROSOL RESPIRATORY (INHALATION) at 21:00

## 2022-07-28 ASSESSMENT — PAIN SCALES - GENERAL
PAINLEVEL_OUTOF10: 0
PAINLEVEL_OUTOF10: 0

## 2022-07-28 NOTE — PROGRESS NOTES
Physical Therapy  Facility/Department: [facility]  Physical Therapy Initial Assessment    Name: Yael Espinoza  : 1945  MRN: [id number]  Date of Service: 2022  Yael Espinoza scored a 20/24 on the AM-PAC short mobility form. Current research shows that an AM-PAC score of 18 or greater is typically associated with a discharge to the patient's home setting. Based on the patient's AM-PAC score and their current functional mobility do not anticipate Pt. Needing PT at d/c. Please see assessment section for further patient specific details. If patient discharges prior to next session this note will serve as a discharge summary. Please see below for the latest assessment towards goals. Discharge Recommendations:  Home with assist PRN, Continue to assess pending progress   PT Equipment Recommendations  Equipment Needed: No  Other: Has a 4 WW at home that will be useful for long distances      Patient Diagnosis(es): There were no encounter diagnoses. Past Medical History:  has a past medical history of AAA (abdominal aortic aneurysm) ([address] AAA (abdominal aortic aneurysm) without rupture (HCC), Atrial fibrillation ([address] CAD (coronary artery disease), CHF (congestive heart failure) ([address] COPD (chronic obstructive pulmonary disease) ([address] History of blood clots, Hyperlipidemia, and Hypertension. Past Surgical History:  has a past surgical history that includes Colonoscopy (07); Hysterectomy (); Appendectomy (); bladder suspension; Cataract removal; Tonsillectomy (as a child); tumor excision; Cholecystectomy (10/15/13); Colonoscopy (2017); joint replacement (2013); Abdominal aortic aneurysm repair; and Cardiac surgery. Assessment   Body Structures, Functions, Activity Limitations Requiring Skilled Therapeutic Intervention: Decreased functional mobility ; Decreased balance;Decreased endurance  Assessment: Pt. presents with Pneumonia, Pt. was recently d/c'd from \[facility]\"" for same diagnosis. Pt. appears to be below baseline level of function d/t SOB and decreased endurance. Pt. will benefit from skilled PT to improve functional mobility/endurance. Anticipate Pt. being able to return home with . Do not feel Pt. will need further PT at d/c. Treatment Diagnosis: Impaired functional mobility  Therapy Prognosis: Good  Decision Making: Medium Complexity  History: as above  Exam: as above  Clinical Presentation: evolving  Barriers to Learning: Mild insight impairment  Requires PT Follow-Up: Yes  Activity Tolerance  Activity Tolerance: Patient limited by endurance  Activity Tolerance Comments: Limited by desaturation to 81% on RA. Replaced O2 to 2 L and Pt. drops to mid 80s with ambulation. Recovers in ~ 1 minute to the [address]: 3-5 times per week  Current Treatment Recommendations: Gait training, Balance training, Equipment evaluation, education, & procurement, Safety education & training, Patient/Caregiver education & training, Stair training, Endurance training  Safety Devices  Type of Devices: Call light within reach, Chair alarm in place, Left in chair, Nurse notified, Gait belt  Restraints  Restraints Initially in Place: No     Restrictions  Restrictions/Precautions  Restrictions/Precautions: Fall Risk  Required Braces or Orthoses?: No  Position Activity Restriction  Other position/activity restrictions: 3L to 2L O2     Subjective   General  Chart Reviewed: Yes  Patient assessed for rehabilitation services?: Yes  Additional Pertinent Hx: 81-year-old female past medical history significant for atrial fibrillation, CHF, CAD daily, dyslipidemia presents to the emergency department complaints of shortness of breath and cough. Response To Previous Treatment: Patient with no complaints from previous session.   Family / Caregiver Present: No  Referring Practitioner: Dr. Ana Kumar  Referral Date : 07/28/22  Diagnosis: Pneumonia  Follows Commands: Within Functional Limits  General Comment  Comments: Sitting up in chair, agreeable to therapy evals  Subjective  Subjective: Denies pain         Social/Functional History  Social/Functional History  Lives With: Spouse  Type of Home: Mobile home  Home Layout: One level  Home Access: Stairs to enter with rails  Entrance Stairs - Number of Steps: 1  Bathroom Shower/Tub:  (walk-in tub w/ small step)  Bathroom Toilet: Standard  Bathroom Equipment: Built-in shower seat, Grab bars in shower  Home Equipment: Lynnda Leventhal, rolling, Lynnda Leventhal, 4 wheeled, Oxygen (2L O2 at night)  Has the patient had two or more falls in the past year or any fall with injury in the past year?: No  ADL Assistance: Independent (Pt can do but sometimes  assists w/ socks and shoes)  Homemaking Assistance: Needs assistance  Homemaking Responsibilities: No  Ambulation Assistance: Independent  Transfer Assistance: Independent  Active : Yes  Occupation: Retired  Type of Occupation: [address] Walking, karaoke on Wednesdays  [address]: Within Functional Limits  Hearing  Hearing: Within functional limits    Cognition   Orientation  Overall Orientation Status: Within Functional Limits  Cognition  Overall Cognitive Status: WFL  Cognition Comment: Mild insight concerns     Objective      Observation/Palpation  Posture: Fair        AROM RLE (degrees)  RLE AROM: WFL  AROM LLE (degrees)  LLE AROM : WFL  Strength RLE  Strength RLE: WFL  Strength LLE  Strength LLE: WFL        Balance  Sitting: Intact  Standing: Intact (SBA/CGA)  Gait  Overall Level of Assistance: Stand-by assistance;Contact-guard assistance (Fxl mobility to/from BR and in hallway w/ SBA/CGA. Very SOB throughout, requires standing and seated rest breaks to recover)  Bed mobility  Supine to Sit: Unable to assess  Sit to Supine: Unable to assess  Bed Mobility Comments:  In recliner at start and end of session  Transfers  Sit to Stand: Supervision  Stand to sit: Supervision  Comment: toilet transfer with Supervision  Ambulation  Surface: level tile  Device: No Device  Other Apparatus: O2 (2L)  Assistance: Contact guard assistance;Supervision  Quality of Gait: Increased medial/lateral sway, gait a bit fast with trunk ahead of LE's at times, mild unsteadiness but no overt LOB  Gait Deviations: Increased BRAULIO; Decreased step length;Decreased step height  Distance: 20' x 1, 10' x 1 and 100' x 2  Comments: Initially took O2 off to go to the bathroom, Pt. noted to be wheezing, SOB, SpO2 had decreased from 100% at start of mobility to 81%. 2 L replaced on Pt.   Pt. desats in the mid 80s during the ambulation session and took ~ 1 minute to return to 90s  More Ambulation?: No  Stairs/Curb  Stairs?: No     Balance  Posture: Good  Sitting - Static: Good  Sitting - Dynamic: Good  Standing - Static: Good;-  Standing - Dynamic: Fair;+  Comments: Mild unsteadiness with standing, wide BRAULIO                                                          AM-PAC Score  AM-PAC Inpatient Mobility Raw Score : 20 (07/28/22 0859)  AM-PAC Inpatient T-Scale Score : 47.67 (07/28/22 0859)  Mobility Inpatient CMS 0-100% Score: 35.83 (07/28/22 0859)  Mobility Inpatient CMS G-Code Modifier : CJ (07/28/22 0859)                 Goals  Short Term Goals  Time Frame for Short term goals: by d/c  Short term goal 1: Supine to/from sit with mod I  Short term goal 2: Sit to/from stand with Mod I  Short term goal 3: Amb. x 250' with LRAD with Mod I  Short term goal 4: Up and down 1-2 steps with Supervision  Patient Goals   Patient goals : return home       Education  Patient Education  Education Given To: Patient  Education Provided: Role of Therapy;Plan of Care  Education Provided Comments: D/c plans, do not get up without assist, use O2 when going to the bathroom d/t desats  Education Method: Verbal  Barriers to Learning: None  Education Outcome: Demonstrated understanding      Therapy Time   Individual Concurrent Group Co-treatment   Time In [address]         Minutes 27         Timed Code Treatment Minutes: [address] [facility], [address] [facility], [id number]

## 2022-07-28 NOTE — PROGRESS NOTES
Occupational Therapy  Facility/Department: [facility]  Occupational Therapy Initial Assessment    Name: Ivan Gallagher  : 1945  MRN: [id number]  Date of Service: 2022    Discharge Recommendations:  Home with assist PRN     Ivan Gallagher scored a 20/24 on the AM-PAC ADL Inpatient form. At this time, no further OT is recommended upon discharge due to pt near baseline. Recommend patient returns to prior setting with prior services. Patient Diagnosis(es): There were no encounter diagnoses. Past Medical History:  has a past medical history of AAA (abdominal aortic aneurysm) ([facility] AAA (abdominal aortic aneurysm) without rupture (HCC), Atrial fibrillation ([facility] [address] CAD (coronary artery disease), CHF (congestive heart failure) ([facility] [address] COPD (chronic obstructive pulmonary disease) ([facility] [address] History of blood clots, Hyperlipidemia, and Hypertension. Past Surgical History:  has a past surgical history that includes Colonoscopy (07); Hysterectomy (); Appendectomy (); bladder suspension; Cataract removal; Tonsillectomy (as a child); tumor excision; Cholecystectomy (10/15/13); Colonoscopy (2017); joint replacement (2013); Abdominal aortic aneurysm repair; and Cardiac surgery. Treatment Diagnosis: Decreased: ADLs, fxl transfers/mobility      Assessment   Performance deficits / Impairments: Decreased functional mobility ; Decreased endurance;Decreased ADL status  Assessment: Pt is a 68 yo F admitted with with SOB and cough; found to have acute on chronic respiratory failure. PTA, pt lives in a mobile home w/ her  where she is typically independent in self-care, fxl mobility, and has assist for all homemaking. Pt reports she wears 2L O2 at night only. She is below baseline at this time, limited by worsening SOB and requiring 2L supplemental O2. She completed fxl transfers w/ SBA and fxl mobility w/ SBA/CGA. She completed toileting and standing grooming SBA.  Pt desatted into mid [de-identified] after each bout of ambulation and requires increased time and seated rest to recover. WIll continue to see on acute in order to address the above deficits and maximize pt's fxl performance. Anticipate pt will be safe to return home w/ assist PRN at d/c-may benefit from using 4WW more often for rest breaks. Treatment Diagnosis: Decreased: ADLs, fxl transfers/mobility  Prognosis: Good  Decision Making: Medium Complexity  REQUIRES OT FOLLOW-UP: Yes  Activity Tolerance  Activity Tolerance: Patient limited by fatigue  Activity Tolerance Comments: Pt ambulated to BR on RA but desatted to 81%. Required 2L O2 for remainder of session - desatted into mid 80s w/ ambulation and required seated rest break and increased time to recover. DIfficulty performing PLB d/t short, shallow breathing        Plan   Plan  Times per Week: 3-5  Times per Day: Daily  Current Treatment Recommendations: Strengthening, Balance training, ROM, Functional mobility training, Endurance training, Safety education & training, Self-Care / ADL     Restrictions  Restrictions/Precautions  Restrictions/Precautions: Fall Risk  Required Braces or Orthoses?: No  Position Activity Restriction  Other position/activity restrictions: 3L to 2L O2    Subjective   General  Chart Reviewed: Yes  Patient assessed for rehabilitation services?: Yes  Additional Pertinent Hx: per H&P: \"69year-old female past medical history significant for atrial fibrillation, CHF, CAD daily, dyslipidemia presents to the emergency department complaints of shortness of breath and cough. Patient reports shortness of breath and cough for the last 3 days associated with weakness. Subjective fever and chill. Denies chest pain, lower extremity swelling. \"  Family / Caregiver Present: No  Referring Practitioner: Yarely Singh MD  Diagnosis: Acute on chronic hypoxic respiratory failure  Subjective  Subjective: Pt met b/s for OT eval/tx w/ PT. Pt in recliner, agreeable to therapy.  Denied pain     Social/Functional History  Social/Functional History  Lives With: Spouse  Type of Home: Mobile home  Home Layout: One level  Home Access: Stairs to enter with rails  Entrance Stairs - Number of Steps: 1  Bathroom Shower/Tub:  (walk-in tub w/ small step)  Bathroom Toilet: Standard  Bathroom Equipment: Built-in shower seat, Grab bars in shower  Home Equipment: Walker, rolling, Walker, 4 wheeled, Oxygen (2L O2 at night)  Has the patient had two or more falls in the past year or any fall with injury in the past year?: No  ADL Assistance: Independent (Pt can do but sometimes  assists w/ socks and shoes)  Homemaking Assistance: Needs assistance  Homemaking Responsibilities: No  Ambulation Assistance: Independent  Transfer Assistance: Independent  Active : Yes  Occupation: Retired  Type of Occupation: [employer] Walking, karaoke on Wednesdays       Objective   Observation/Palpation  Posture: Fair  Safety Devices  Type of Devices: Call light within reach; Chair alarm in place; Left in chair;Nurse notified;Gait belt  Balance  Sitting: Intact  Standing: Intact (SBA/CGA)  Gait  Overall Level of Assistance: Stand-by assistance;Contact-guard assistance (Fxl mobility to/from BR and in hallway w/ SBA/CGA.  Very SOB throughout, requires standing and seated rest breaks to recover)     AROM: Generally decreased, functional  PROM: Generally decreased, functional  Strength: Generally decreased, functional  Coordination: Generally decreased, functional  ADL  Grooming: Stand by assistance  Grooming Skilled Clinical Factors: Washed hands standing at the sink  Toileting: Stand by assistance;Contact guard assistance  Toileting Skilled Clinical Factors: Pt voided urine from commode, she managed briefs w/ SBA/CGA for balance  Additional Comments: Anticipate pt would be independent w/ feeding, SBA UB bathing/dressing, min A LB bathing/dressing based on ROM, strength, endurance this session     Activity Tolerance  Activity Tolerance: Patient limited by endurance  Activity Tolerance Comments: Limited by desaturation to 81% on RA. Replaced O2 to 2 L and Pt. drops to mid 80s with ambulation. Recovers in ~ 1 minute to the 90s  Bed mobility  Supine to Sit: Unable to assess  Sit to Supine: Unable to assess  Bed Mobility Comments: In recliner at start and end of session  Transfers  Sit to stand: Stand by assistance  Stand to sit: Stand by assistance  Vision  Vision: Within Functional Limits  Hearing  Hearing: Within functional limits  Cognition  Overall Cognitive Status: [facility]  Cognition Comment: Mild insight concerns  Orientation  Overall Orientation Status: Within Functional Limits                  Education Given To: Patient  Education Provided: Role of Therapy;Transfer Training;ADL Adaptive Strategies; Energy Conservation  Education Method: Demonstration;Verbal  Barriers to Learning: None  Education Outcome: Verbalized understanding                 AM-PAC Score  AM-PAC Inpatient Daily Activity Raw Score: 20 (07/28/22 0853)  AM-PAC Inpatient ADL T-Scale Score : 42.03 (07/28/22 0853)  ADL Inpatient CMS 0-100% Score: 38.32 (07/28/22 0853)  ADL Inpatient CMS G-Code Modifier : CJ (07/28/22 7381)      Goals  Short Term Goals  Time Frame for Short term goals: Prior to d/c  Short Term Goal 1: Pt will complete ADL transfers mod I  Short Term Goal 2: Pt will toilet w/ supervision  Short Term Goal 3: Pt will bathe w/ supervision  Short Term Goal 4: Pt will dress w/ min A  Short Term Goal 5: Pt will tolerate standing x2-3 mins during ADL task w/ O2 sats remaining 88% or above  Long Term Goals  Time Frame for Long term goals : LTG=STG  Patient Goals   Patient goals : to go home       Therapy Time   Individual Concurrent Group Co-treatment   Time In 0813         Time Out 0840         Minutes 27         Timed Code Treatment Minutes: 12 Minutes (15 min eval)       Ivory Roth, OTR/L [id number]

## 2022-07-28 NOTE — PLAN OF CARE
Problem: Discharge Planning  Goal: Discharge to home or other facility with appropriate resources  7/28/2022 1010 by Jaspal Benedict RN  Outcome: Progressing  7/27/2022 2315 by Kerry Burgess RN  Outcome: Progressing  Flowsheets  Taken 7/27/2022 2007 by Kerry Burgess RN  Discharge to home or other facility with appropriate resources:   Identify barriers to discharge with patient and caregiver   Arrange for needed discharge resources and transportation as appropriate   Identify discharge learning needs (meds, wound care, etc)   Refer to discharge planning if patient needs post-hospital services based on physician order or complex needs related to functional status, cognitive ability or social support system  Taken 7/27/2022 1512 by Kevin Gold RN  Discharge to home or other facility with appropriate resources: Identify discharge learning needs (meds, wound care, etc)  Note: Plans for pt to go home at discharge     Problem: Safety - Adult  Goal: Free from fall injury  7/28/2022 1010 by Jaspal Benedict RN  Outcome: Progressing  7/27/2022 2315 by Kerry Burgess RN  Outcome: Progressing  Flowsheets (Taken 7/27/2022 2313)  Free From Fall Injury: Instruct family/caregiver on patient safety  Note: Fall precautions are in place     Problem: ABCDS Injury Assessment  Goal: Absence of physical injury  7/28/2022 1010 by Jaspal Benedict RN  Outcome: Progressing  7/27/2022 2315 by Kerry Burgess RN  Flowsheets (Taken 7/27/2022 2313)  Absence of Physical Injury: Implement safety measures based on patient assessment  Note: Fall precautions are in placed, and family at bedside     Problem: Respiratory - Adult  Goal: Achieves optimal ventilation and oxygenation  7/28/2022 1010 by Jaspal Benedict RN  Outcome: Progressing  7/27/2022 2316 by Kerry Burgess RN  Outcome: Progressing  Flowsheets (Taken 7/27/2022 2316)  Achieves optimal ventilation and oxygenation:   Assess for changes in respiratory status   Assess for changes in mentation and behavior   Position to facilitate oxygenation and minimize respiratory effort   Oxygen supplementation based on oxygen saturation or arterial blood gases  Note: Pt on IV antibiotics for pneumonia

## 2022-07-28 NOTE — CARE COORDINATION
07/28/22 1031   Readmission Assessment   Number of Days since last admission? 8-30 days   Previous Disposition Home with Family   Who is being Interviewed Patient   What was the patient's/caregiver's perception as to why they think they needed to return back to the hospital? Other (Comment)  (couldn't breathe.)   Did you visit your Primary Care Physician after you left the hospital, before you returned this time? Yes   Did you see a specialist, such as Cardiac, Pulmonary, Orthopedic Physician, etc. after you left the hospital? No   Who advised the patient to return to the hospital? Self-referral   Does the patient report anything that got in the way of taking their medications? No   In our efforts to provide the best possible care to you and others like you, can you think of anything that we could have done to help you after you left the hospital the first time, so that you might not have needed to return so soon? Other (Comment)     Respectfully submitted,    ERON Gudino  [facility]   [phone]    Electronically signed by ERON Dasilva on 7/28/2022 at 10:32 AM

## 2022-07-28 NOTE — PROGRESS NOTES
[facility]    Respiratory Therapy   Home Oxygen Evaluation        Name: Bhaskar Og Record Number: [id number]  Age: 69 y.o. Gender:  female   : 1945  Today's date: 2022  Room: F7N-6639/4265-01      Assessment        BP (!) 153/63   Pulse 61   Temp 98.2 °F (36.8 °C) (Oral)   Resp 18   Ht 5' 4\" (1.626 m)   Wt 229 lb 8 oz (104.1 kg)   SpO2 99%   BMI 39.39 kg/m²     Patient Active Problem List   Diagnosis    Primary hypertension    Hyperlipidemia    Coronary artery disease    DENISE (obstructive sleep apnea)    History of DVT (deep vein thrombosis)    Family history of DVT    AAA (abdominal aortic aneurysm) without rupture ([facility])    Pleural effusion, left    Pleural effusion    COPD exacerbation ([facility])    Pelvic pain in female    Vitamin D deficiency    Other emphysema ([facility])    Acute bronchitis    Acute respiratory failure with hypoxia ([facility])    Abnormal chest x-ray    Atypical pneumonia    Atrial fibrillation with RVR ([facility])    Chronic diastolic heart failure ([facility])    PVD (peripheral vascular disease) ([facility])    Abnormal nuclear stress test    AAA (abdominal aortic aneurysm) ([facility])    PAF (paroxysmal atrial fibrillation) ([facility])    Endoleak post (EVAR) endovascular aneurysm repair, sequela    Carotid artery stenosis without cerebral infarction, bilateral    History of repair of aneurysm of abdominal aorta using endovascular stent graft    Atherosclerotic heart disease of native coronary artery with other forms of angina pectoris ([facility])    Morbidly obese ([facility])    COPD, severe ([address]    Hypertensive crisis    Acute on chronic diastolic congestive heart failure (HCC)    Respiratory failure (HCC)    Left atrial flutter by electrocardiogram ([facility])    Persistent atrial fibrillation (HCC)    A-fib ([facility])    Obesity (BMI 30-39. 9)    Atrial fibrillation ([facility])    SOB (shortness of breath)    Chronic systolic (congestive) heart failure    Pneumonia       Social History:  Social History Tobacco Use    Smoking status: Former     Packs/day: 1.00     Years: 37.00     Pack years: 37.00     Types: Cigarettes     Start date: 1976     Quit date: 2013     Years since quittin.8    Smokeless tobacco: Never    Tobacco comments:     E cigarette now and then   Vaping Use    Vaping Use: Some days    Substances: Nicotine   Substance Use Topics    Alcohol use: No    Drug use: No       Patient Room Air saturation at rest 94%. Patient Room Air saturation upon ambulation 86%. Oxygen saturations of 88% or less on RA qualifies patient for Home Oxygen    Patient resting on 0  lmp  with an oxygen saturation of  94 %. Patient ambulated on 0 lpm with an oxygen saturation of 86%. Patient ambulated on 2 lpm with an oxygen saturation of 92%. Qualifying patient for home oxygen with ambulation and continuous flow  @ 2 lpm.      In your clinical assessment does the Patient Require Portable Oxygen Tanks?     Yes              [facility]  home care [place] contacted to follow care of patients home oxygen needs on 2022 at 4:50 PM    Patient/caregiver was educated on Home Oxygen process:  Yes      Level of patient/caregiver understanding able to:   [] Verbalize understanding   [] Demonstrate understanding       [] Teach back        [] Needs reinforcement        []  No available caregiver               []  Other:     Response to education:  Very Good     Time Spent with Home O2 Set Up:  20  minutes     Jessy Cast RCP on 2022 at 4:50 PM

## 2022-07-28 NOTE — PROGRESS NOTES
Hospitalist Progress Note      PCP: Vera Castellanos MD    Date of Admission: 7/26/2022    Chief Complaint:   Chief Complaint   Patient presents with    Shortness of [address] Course: 78-year-old female past medical history significant for atrial fibrillation, CHF, CAD daily, dyslipidemia presents to the emergency department complaints of shortness of breath and cough. Patient reports shortness of breath and cough for the last 3 days associated with weakness. Subjective fever and chill. Denies chest pain, lower extremity swelling.     7/28  She reports feeling better. She is requesting to see her pulmonologist while she is in the hospital.    Subjective: as above      Medications:  Reviewed    Infusion Medications    sodium chloride       Scheduled Medications    cefepime  2,000 mg IntraVENous Q12H    [START ON 7/29/2022] furosemide  20 mg Oral Daily    dofetilide  250 mcg Oral Q12H    hydrALAZINE  10 mg Oral 3 times per day    isosorbide mononitrate  30 mg Oral Daily    metoprolol succinate  25 mg Oral Daily    lisinopril  20 mg Oral Daily    rivaroxaban  15 mg Oral Daily with breakfast    rosuvastatin  20 mg Oral Daily    mometasone-formoterol  2 puff Inhalation BID    sodium chloride flush  5-40 mL IntraVENous 2 times per day    doxycycline hyclate  100 mg Oral 2 times per day    predniSONE  40 mg Oral Daily     PRN Meds: sodium chloride flush, sodium chloride, polyethylene glycol, acetaminophen **OR** acetaminophen      Intake/Output Summary (Last 24 hours) at 7/28/2022 1502  Last data filed at 7/28/2022 1247  Gross per 24 hour   Intake 1176 ml   Output 1400 ml   Net -224 ml       Physical Exam Performed:    BP (!) 149/57   Pulse 57   Temp 98.1 °F (36.7 °C) (Oral)   Resp 18   Ht 5' 4\" (1.626 m)   Wt 229 lb 8 oz (104.1 kg)   SpO2 96%   BMI 39.39 kg/m²     General appearance: No apparent distress, appears stated age and cooperative. HEENT: Pupils equal, round, and reactive to light. Conjunctivae/corneas clear. Neck: Supple, with full range of motion. No jugular venous distention. Trachea midline. Respiratory:  Normal respiratory effort. Clear to auscultation, bilaterally without Rales/Wheezes/Rhonchi. Cardiovascular: Regular rate and rhythm with normal S1/S2 without murmurs, rubs or gallops. Abdomen: Soft, non-tender, non-distended with normal bowel sounds. Musculoskeletal: No clubbing, cyanosis or edema bilaterally. Full range of motion without deformity. Skin: Skin color, texture, turgor normal.  No rashes or lesions. Neurologic:  Neurovascularly intact without any focal sensory/motor deficits. Cranial nerves: II-XII intact, grossly non-focal.  Psychiatric: Alert and oriented, thought content appropriate, normal insight  Capillary Refill: Brisk,3 seconds, normal   Peripheral Pulses: +2 palpable, equal bilaterally       Labs:   Recent Labs     07/26/22  2336 07/27/22  1105 07/28/22  0740   WBC 20.5* 16.8* 17.2*   HGB 10.2* 10.2* 10.4*   HCT 31.5* 31.2* 31.8*    148 169     Recent Labs     07/27/22  0118 07/27/22  1105 07/28/22  0740    136 135*   K 4.7 4.3 4.5   CL 98* 96* 95*   CO2 29 30 30   BUN 22* 24* 35*   CREATININE 1.0 1.1 1.3*   CALCIUM 8.9 8.9 9.0     Recent Labs     07/27/22  0118   AST 11*   ALT 10   BILITOT 0.6   ALKPHOS 73     No results for input(s): INR in the last 72 hours. Recent Labs     07/26/22  2257   TROPONINI <0.01       Urinalysis:      Lab Results   Component Value Date/Time    NITRU Negative 09/09/2021 11:52 AM    WBCUA 1 09/09/2021 11:52 AM    BACTERIA 1+ 09/24/2020 06:30 PM    RBCUA 1 09/09/2021 11:52 AM    BLOODU TRACE 09/09/2021 11:52 AM    SPECGRAV 1.010 09/09/2021 11:52 AM    GLUCOSEU Negative 09/09/2021 11:52 AM       Radiology:  XR CHEST PORTABLE   Final Result   New left suprahilar ground-glass opacity may represent developing   consolidation in the appropriate clinical setting.                  Assessment/Plan:    C/Moe Foster 1379 Problems    Diagnosis     Pneumonia [J18.9]      Priority: Medium    Acute respiratory failure with hypoxia (HCC) [J96.01]        Acute on chronic hypoxic respiratory failure  Sepsis due to Pneumonia    COPD exacerbation  Patient only uses 2 L oxygen at nighttime. Chest x-ray with left lower lobe infiltrate  Ceftriaxone and doxycycline for 5 days  Schedule bronchodilators  Oral prednisone  Perform home O2 evaluation  Consult pulmonologist for recommendations     Acute on chronic diastolic heart failure  last ECHO: 9/2021 EF 37% Grade 2 Diastolic Dysfunction  Appears to be fairly compensated  Discontinue IV lasix, switch to oral       Atrial Fibrillation   - currently rate controlled  - continue Xarelto, Dofetilide and metoprolol   EP following     Essential (primary) hypertension  - monitor blood pressure  - continue home meds     Hyperlipidemia   - continue statin     DVT Prophylaxis: Xarelto  Diet: ADULT DIET; Regular; 4 carb choices (60 gm/meal);  Low Sodium (2 gm); 1500 ml  Code Status: Full Code     PT/OT Eval Status: Requested     Dispo -return home    Radha Alvarez MD

## 2022-07-28 NOTE — CARE COORDINATION
SW consult noted for [facility] vs SNF. We are waiting on therapy recommendations but will follow up with patient and family this morning. Respectfully submitted,    ERON Nova  [facility]   [phone]    Electronically signed by ERON Hanley on 7/28/2022 at 8:39 AM

## 2022-07-28 NOTE — CARE COORDINATION
submitted,    Carla Hernandez3, ERON  [facility]   [phone]    Electronically signed by ERON Ramos on 7/28/2022 at 10:35 AM

## 2022-07-29 ENCOUNTER — APPOINTMENT (OUTPATIENT)
Dept: ULTRASOUND IMAGING | Age: 77
DRG: 871 | End: 2022-07-29
Payer: MEDICARE

## 2022-07-29 ENCOUNTER — APPOINTMENT (OUTPATIENT)
Dept: GENERAL RADIOLOGY | Age: 77
DRG: 871 | End: 2022-07-29
Payer: MEDICARE

## 2022-07-29 LAB
ANION GAP SERPL CALCULATED.3IONS-SCNC: 8 MMOL/L (ref 3–16)
BASOPHILS ABSOLUTE: 0 K/UL (ref 0–0.2)
BASOPHILS RELATIVE PERCENT: 0.2 %
BILIRUBIN URINE: NEGATIVE
BLOOD, URINE: NEGATIVE
BUN BLDV-MCNC: 47 MG/DL (ref 7–20)
CALCIUM SERPL-MCNC: 8.9 MG/DL (ref 8.3–10.6)
CHLORIDE BLD-SCNC: 97 MMOL/L (ref 99–110)
CLARITY: CLEAR
CO2: 30 MMOL/L (ref 21–32)
COLOR: YELLOW
CREAT SERPL-MCNC: 1.7 MG/DL (ref 0.6–1.2)
CREATININE URINE: 53.6 MG/DL (ref 28–259)
EOSINOPHILS ABSOLUTE: 0.1 K/UL (ref 0–0.6)
EOSINOPHILS RELATIVE PERCENT: 1 %
GFR AFRICAN AMERICAN: 35
GFR NON-AFRICAN AMERICAN: 29
GLUCOSE BLD-MCNC: 101 MG/DL (ref 70–99)
GLUCOSE URINE: NEGATIVE MG/DL
HCT VFR BLD CALC: 31 % (ref 36–48)
HEMOGLOBIN: 10.3 G/DL (ref 12–16)
KETONES, URINE: NEGATIVE MG/DL
LEUKOCYTE ESTERASE, URINE: NEGATIVE
LYMPHOCYTES ABSOLUTE: 1.6 K/UL (ref 1–5.1)
LYMPHOCYTES RELATIVE PERCENT: 11.1 %
MCH RBC QN AUTO: 29 PG (ref 26–34)
MCHC RBC AUTO-ENTMCNC: 33.1 G/DL (ref 31–36)
MCV RBC AUTO: 87.7 FL (ref 80–100)
MICROSCOPIC EXAMINATION: NORMAL
MONOCYTES ABSOLUTE: 1.4 K/UL (ref 0–1.3)
MONOCYTES RELATIVE PERCENT: 9.6 %
NEUTROPHILS ABSOLUTE: 11 K/UL (ref 1.7–7.7)
NEUTROPHILS RELATIVE PERCENT: 78.1 %
NITRITE, URINE: NEGATIVE
OSMOLALITY URINE: 488 MOSM/KG (ref 390–1070)
PDW BLD-RTO: 15.3 % (ref 12.4–15.4)
PH UA: 5 (ref 5–8)
PLATELET # BLD: 189 K/UL (ref 135–450)
PMV BLD AUTO: 9.3 FL (ref 5–10.5)
POTASSIUM SERPL-SCNC: 4.4 MMOL/L (ref 3.5–5.1)
PROTEIN PROTEIN: 10 MG/DL
PROTEIN UA: NEGATIVE MG/DL
PROTEIN/CREAT RATIO: 0.2 MG/DL
RBC # BLD: 3.54 M/UL (ref 4–5.2)
SODIUM BLD-SCNC: 135 MMOL/L (ref 136–145)
SODIUM URINE: 36 MMOL/L
SPECIFIC GRAVITY UA: 1.01 (ref 1–1.03)
URIC ACID, SERUM: 9.4 MG/DL (ref 2.6–6)
URINE REFLEX TO CULTURE: NORMAL
URINE TYPE: NORMAL
UROBILINOGEN, URINE: 0.2 E.U./DL
WBC # BLD: 14.1 K/UL (ref 4–11)

## 2022-07-29 PROCEDURE — 94669 MECHANICAL CHEST WALL OSCILL: CPT

## 2022-07-29 PROCEDURE — 84300 ASSAY OF URINE SODIUM: CPT

## 2022-07-29 PROCEDURE — 6360000002 HC RX W HCPCS: Performed by: INTERNAL MEDICINE

## 2022-07-29 PROCEDURE — 71045 X-RAY EXAM CHEST 1 VIEW: CPT

## 2022-07-29 PROCEDURE — 84156 ASSAY OF PROTEIN URINE: CPT

## 2022-07-29 PROCEDURE — 84550 ASSAY OF BLOOD/URIC ACID: CPT

## 2022-07-29 PROCEDURE — 85025 COMPLETE CBC W/AUTO DIFF WBC: CPT

## 2022-07-29 PROCEDURE — 51798 US URINE CAPACITY MEASURE: CPT

## 2022-07-29 PROCEDURE — 76770 US EXAM ABDO BACK WALL COMP: CPT

## 2022-07-29 PROCEDURE — 80048 BASIC METABOLIC PNL TOTAL CA: CPT

## 2022-07-29 PROCEDURE — 6370000000 HC RX 637 (ALT 250 FOR IP): Performed by: STUDENT IN AN ORGANIZED HEALTH CARE EDUCATION/TRAINING PROGRAM

## 2022-07-29 PROCEDURE — 97530 THERAPEUTIC ACTIVITIES: CPT

## 2022-07-29 PROCEDURE — 81003 URINALYSIS AUTO W/O SCOPE: CPT

## 2022-07-29 PROCEDURE — 6370000000 HC RX 637 (ALT 250 FOR IP): Performed by: INTERNAL MEDICINE

## 2022-07-29 PROCEDURE — 1200000000 HC SEMI PRIVATE

## 2022-07-29 PROCEDURE — 2700000000 HC OXYGEN THERAPY PER DAY

## 2022-07-29 PROCEDURE — 2580000003 HC RX 258: Performed by: INTERNAL MEDICINE

## 2022-07-29 PROCEDURE — 36415 COLL VENOUS BLD VENIPUNCTURE: CPT

## 2022-07-29 PROCEDURE — 94760 N-INVAS EAR/PLS OXIMETRY 1: CPT

## 2022-07-29 PROCEDURE — 2580000003 HC RX 258: Performed by: STUDENT IN AN ORGANIZED HEALTH CARE EDUCATION/TRAINING PROGRAM

## 2022-07-29 PROCEDURE — 6370000000 HC RX 637 (ALT 250 FOR IP): Performed by: NURSE PRACTITIONER

## 2022-07-29 PROCEDURE — 99223 1ST HOSP IP/OBS HIGH 75: CPT | Performed by: INTERNAL MEDICINE

## 2022-07-29 PROCEDURE — 94640 AIRWAY INHALATION TREATMENT: CPT

## 2022-07-29 PROCEDURE — 83935 ASSAY OF URINE OSMOLALITY: CPT

## 2022-07-29 PROCEDURE — 82570 ASSAY OF URINE CREATININE: CPT

## 2022-07-29 PROCEDURE — 97535 SELF CARE MNGMENT TRAINING: CPT

## 2022-07-29 RX ORDER — CLONIDINE HYDROCHLORIDE 0.1 MG/1
0.1 TABLET ORAL EVERY 4 HOURS PRN
Status: DISCONTINUED | OUTPATIENT
Start: 2022-07-29 | End: 2022-08-01 | Stop reason: HOSPADM

## 2022-07-29 RX ORDER — ISOSORBIDE MONONITRATE 60 MG/1
60 TABLET, EXTENDED RELEASE ORAL DAILY
Status: DISCONTINUED | OUTPATIENT
Start: 2022-07-30 | End: 2022-08-01 | Stop reason: HOSPADM

## 2022-07-29 RX ORDER — ALBUTEROL SULFATE 90 UG/1
2 AEROSOL, METERED RESPIRATORY (INHALATION) EVERY 6 HOURS PRN
Status: DISCONTINUED | OUTPATIENT
Start: 2022-07-29 | End: 2022-08-01 | Stop reason: HOSPADM

## 2022-07-29 RX ORDER — LANOLIN ALCOHOL/MO/W.PET/CERES
6 CREAM (GRAM) TOPICAL NIGHTLY PRN
Status: DISCONTINUED | OUTPATIENT
Start: 2022-07-29 | End: 2022-08-01 | Stop reason: HOSPADM

## 2022-07-29 RX ORDER — SODIUM CHLORIDE 9 MG/ML
INJECTION, SOLUTION INTRAVENOUS CONTINUOUS
Status: DISCONTINUED | OUTPATIENT
Start: 2022-07-29 | End: 2022-07-30

## 2022-07-29 RX ORDER — AMLODIPINE BESYLATE 10 MG/1
10 TABLET ORAL NIGHTLY
Status: DISCONTINUED | OUTPATIENT
Start: 2022-07-29 | End: 2022-08-01 | Stop reason: HOSPADM

## 2022-07-29 RX ADMIN — DOXYCYCLINE HYCLATE 100 MG: 100 TABLET, COATED ORAL at 22:08

## 2022-07-29 RX ADMIN — PREDNISONE 40 MG: 20 TABLET ORAL at 08:54

## 2022-07-29 RX ADMIN — AMLODIPINE BESYLATE 10 MG: 10 TABLET ORAL at 21:56

## 2022-07-29 RX ADMIN — MOMETASONE FUROATE AND FORMOTEROL FUMARATE DIHYDRATE 2 PUFF: 200; 5 AEROSOL RESPIRATORY (INHALATION) at 08:23

## 2022-07-29 RX ADMIN — ROSUVASTATIN CALCIUM 20 MG: 20 TABLET, FILM COATED ORAL at 08:54

## 2022-07-29 RX ADMIN — DOFETILIDE 250 MCG: 0.25 CAPSULE ORAL at 21:56

## 2022-07-29 RX ADMIN — LISINOPRIL 20 MG: 10 TABLET ORAL at 08:54

## 2022-07-29 RX ADMIN — FUROSEMIDE 20 MG: 20 TABLET ORAL at 08:54

## 2022-07-29 RX ADMIN — SODIUM CHLORIDE, PRESERVATIVE FREE 10 ML: 5 INJECTION INTRAVENOUS at 21:57

## 2022-07-29 RX ADMIN — METOPROLOL SUCCINATE 25 MG: 25 TABLET, EXTENDED RELEASE ORAL at 08:54

## 2022-07-29 RX ADMIN — DOXYCYCLINE HYCLATE 100 MG: 100 TABLET, COATED ORAL at 08:54

## 2022-07-29 RX ADMIN — SODIUM CHLORIDE: 9 INJECTION, SOLUTION INTRAVENOUS at 14:38

## 2022-07-29 RX ADMIN — RIVAROXABAN 15 MG: 15 TABLET, FILM COATED ORAL at 08:54

## 2022-07-29 RX ADMIN — Medication 6 MG: at 21:56

## 2022-07-29 RX ADMIN — MOMETASONE FUROATE AND FORMOTEROL FUMARATE DIHYDRATE 2 PUFF: 200; 5 AEROSOL RESPIRATORY (INHALATION) at 21:06

## 2022-07-29 RX ADMIN — DOFETILIDE 250 MCG: 0.25 CAPSULE ORAL at 09:01

## 2022-07-29 RX ADMIN — CEFEPIME 2000 MG: 2 INJECTION, POWDER, FOR SOLUTION INTRAVENOUS at 21:59

## 2022-07-29 RX ADMIN — ISOSORBIDE MONONITRATE 30 MG: 30 TABLET, EXTENDED RELEASE ORAL at 08:54

## 2022-07-29 RX ADMIN — CEFEPIME 2000 MG: 2 INJECTION, POWDER, FOR SOLUTION INTRAVENOUS at 04:57

## 2022-07-29 RX ADMIN — HYDRALAZINE HYDROCHLORIDE 10 MG: 10 TABLET, FILM COATED ORAL at 04:57

## 2022-07-29 ASSESSMENT — ENCOUNTER SYMPTOMS
SHORTNESS OF BREATH: 1
GASTROINTESTINAL NEGATIVE: 1
EYES NEGATIVE: 1

## 2022-07-29 ASSESSMENT — PAIN SCALES - GENERAL: PAINLEVEL_OUTOF10: 0

## 2022-07-29 NOTE — CARE COORDINATION
[facility]    Referral received from  to follow for home care services. I will follow for needs, and speak with patient to verify demos. If patient discharges over the weekend, please fax order and TOMEKA, and all docs to [facility] at [phone] upon discharge.     Thank you,    Koki Miller RN, BSN CTN  [facility] [phone]

## 2022-07-29 NOTE — PROGRESS NOTES
Physician Progress Note      PATIENT:               Shea Salinas  CSN #:                  [id number]  :                       1945  ADMIT DATE:       2022 10:06 PM  DISCH DATE:  RESPONDING  PROVIDER #:        Tim Paredes          QUERY TEXT:    Pt admitted with dyspnea. Pt noted to have pneumonia. If possible, please   document in the progress notes and discharge summary if you are evaluating   and/or treating any of the following:      Note: CAP and HCAP indicate where the pneumonia was acquired, not a specific   type. The medical record reflects the following:  Risk Factors: Current admission for pneumonia. Clinical Indicators: RR 36,  95% 4L. .. WBC 20.5, Neut 18.7. ... Adrianna Rast Adrianna Rast CXR -New   left suprahilar ground-glass opacity may represent  developing consolidation in the appropriate clinical setting  Treatment: Vanco IV, Cefepime IV, Rocephin IV, Doxycycline oral.    Thank Zak Silva RN BSN CDS CRCR  [email or web address]  Options provided:  -- Treating for gram negative pneumonia  -- Treating for gram positive pneumonia  -- Other - I will add my own diagnosis  -- Disagree - Not applicable / Not valid  -- Disagree - Clinically unable to determine / Unknown  -- Refer to Clinical Documentation Reviewer    PROVIDER RESPONSE TEXT:    This patient is being treated for gram negative pneumonia.     Query created by: Lety Hammonds on 2022 10:17 AM      Electronically signed by:  Tim Paredes 2022 12:18 PM

## 2022-07-29 NOTE — PROGRESS NOTES
Pt remains on 2l O2, SPO2 maintained. Denies SOB/pain. Uses call light appropriately for assistance. Bed alarm on for safety.

## 2022-07-29 NOTE — CONSULTS
[address] [place] [address] Nephrology   [facility]. [facility]  [phone]  Nephrology Consult Note          Patient ID: Laurie Rojas  Referring/ Physician: Yarely Singh, *      HPI/Summary:   Laurie Rojas is being seen by nephrology for MARK and hyponatremia. This is a 71-year-old lady with past medical history significant for atrial fibrillation, CAD, CHF, hyperlipidemia, COPD and history of blood clots. She was admitted for shortness of breath. She has been having several days of shortness of breath with cough, feeling weak and fatigued. Said she was having fevers and chills. She has no chest pain no nausea no vomiting no diarrhea. No edema. Her breathing feels better. No edema on exam. Not using NSAIDs. Urine is dark but no blood or foamy frothy urine. , no dysuria. /58  97%  on 2 L   UO 1500 cc yesterday     Labs reviewed.    Sodium 135 potassium 4.4 bicarb 30 BUN 47 creatinine 1.7  Calcium 8.9 hemoglobin 10.3 white blood cell count 14.1    Plan:   Clinically she does not appear volume overloaded on exam.   Hold diuretics today   Hold lisinopril   Strict IO   Check P/cr  Bladder scan to enusre no retention    Acute kidney injury  Baseline creatinine appears to be 1.3-1.5  Her creatinine at time of consult is 1.7  Urinalysis shows trace blood 100 mg/dL protein, no hyaline casts 1 WBC 1 RBC  Urine output has been good  She does not have a Linder, confirm bladder scan no retention  She has not had any hypotensive episodes here, was very hypertensive on admission at 197/82  Renal ultrasound has been ordered but not done yet  She has received Vanco and cefepime, also received Lasix and lisinopril during this admission    Shortness of breath  Her chest x-ray shows a new left suprahilar groundglass opacity possibly consolidation, pneumonia  BNP is elevated at 4288  Last echo showed an EF about 09%, diastolic dysfunction grade 2 diastolic dysfunction left atrium was severely dilated  She received 3 doses of IV Lasix, held today for creatinine rising    Hypertension  She is on amlodipine 10 mg daily  Hydralazine was stopped she was on 10 mg 3 times daily  She is on lisinopril 20 mg daily, this is held with MARK  Imdur 60 mg daily and metoprolol 25 mg daily  She is on [facility] Nephrology would like to thank you for the opportunity to serve this patient. Please call with any questions or concerns. Sharri Dobson MD  [facility]  Wei Sosa  [place], [address]  Fax: [phone]  Office: [phone]         CC/Reason for consult:   Reason for consult: MARK  Chief Complaint   Patient presents with    Shortness of Breath           Review of Systems:   [address]. All other remaining systems are negative. Constitutional:  fever, chills, weakness, weight change, fatigue,      Skin:  rash, pruritus, hair loss, bruising, dry skin, petechiae. Head, Face, Neck   headaches, swelling,  cervical adenopathy. Respiratory: shortness of breath, cough, or wheezing  Cardiovascular: chest pain, palpitations, dizzy, edema  Gastrointestinal: nausea, vomiting, diarrhea, constipation,belly pain    Yellow skin, blood in stool  Musculoskeletal:  back pain, muscle weakness, gait problems,       joint pain or swelling. Genitourinary:  dysuria, poor urine flow, flank pain, blood in urine  Neurologic:  vertigo, TIA'S, syncope, seizures, focal weakness  Psychosocial:  insomnia, anxiety, or depression.   Additional positive findings: -     PMH/SH/FH:    Medical Hx: reviewed and updated as appropriate  Past Medical History:   Diagnosis Date    AAA (abdominal aortic aneurysm) ([address]     pt states it is 4cm    AAA (abdominal aortic aneurysm) without rupture ([address] 2/10/2015    Atrial fibrillation (HCC)     CAD (coronary artery disease)     CHF (congestive heart failure) (HCC)     COPD (chronic obstructive pulmonary disease) (HCC)     History of blood clots     Hyperlipidemia     Hypertension Surgical Hx: reviewed and updated as appropriate   has a past surgical history that includes Colonoscopy (07); Hysterectomy (); Appendectomy (); bladder suspension; Cataract removal; Tonsillectomy (as a child); tumor excision; Cholecystectomy (10/15/13); Colonoscopy (2017); joint replacement (2013); Abdominal aortic aneurysm repair; and Cardiac surgery. Social Hx: reviewed and updated as appropriate  Social History     Tobacco Use    Smoking status: Former     Packs/day: 1.00     Years: 37.00     Pack years: 37.00     Types: Cigarettes     Start date: 1976     Quit date: 2013     Years since quittin.8    Smokeless tobacco: Never    Tobacco comments:     E cigarette now and then   Substance Use Topics    Alcohol use: No        Family hx: reviewed and updated as appropriate  family history includes Heart Disease in her father; Hypertension in an other family member. Medications:   amLODIPine, 10 mg, Nightly  [START ON 2022] isosorbide mononitrate, 60 mg, Daily  cefepime, 2,000 mg, BID  dofetilide, 250 mcg, Q12H  metoprolol succinate, 25 mg, Daily  rivaroxaban, 15 mg, Daily with breakfast  rosuvastatin, 20 mg, Daily  mometasone-formoterol, 2 puff, BID  sodium chloride flush, 5-40 mL, 2 times per day  doxycycline hyclate, 100 mg, 2 times per day  predniSONE, 40 mg, Daily       Morphine    Allergies: Allergies   Allergen Reactions    Morphine Rash     rash         Physical Exam/Objective:   Vitals:    22 0855   BP: (!) 152/58   Pulse: 62   Resp: 20   Temp: 97.6 °F (36.4 °C)   SpO2: 97%       Intake/Output Summary (Last 24 hours) at 2022 1307  Last data filed at 2022 1220  Gross per 24 hour   Intake 460 ml   Output 900 ml   Net -440 ml         General appearance:  in no acute distress, comfortable, communicative, awake and alert. HEENT: no icterus, EOM intact, trachea midline. Neck : no masses, appears symmetrical and no JVD appreciated. Respiratory: Respiratory effort normal, bilateral equal chest rise. No wheeze, no crackles   Cardiovascular: Ausculation shows RRR and  no edema   Abdomen: abdomen is soft, non distended, no masses, no pain with palpation. Musculoskeletal:  no joint swelling, no deformity, strength grossly normal.   Skin: no rashes, no induration, no tightening, no jaundice   Neuro:   Follows commands, moves all extremities spontaneously       Data:   CBC:   Recent Labs     07/27/22  1105 07/28/22  0740 07/29/22  0616   WBC 16.8* 17.2* 14.1*   HGB 10.2* 10.4* 10.3*   HCT 31.2* 31.8* 31.0*    169 189     BMP:    Recent Labs     07/27/22  1105 07/28/22  0740 07/29/22  0616    135* 135*   K 4.3 4.5 4.4   CL 96* 95* 97*   CO2 30 30 30   BUN 24* 35* 47*   CREATININE 1.1 1.3* 1.7*   GLUCOSE 178* 115* 101*   MG 2.20  --   --

## 2022-07-29 NOTE — PROGRESS NOTES
Occupational Therapy  Facility/Department: [address] MED SURG  Occupational Therapy Daily Treatment    Name: Marely Hackett  : 1945  MRN: [id number]  Date of Service: 2022    Discharge Recommendations:  Home with assist PRN     Marely Hackett scored a 20/24 on the AM-PAC ADL Inpatient form. At this time, no further OT is recommended upon discharge due to anticipated return to baseline. Recommend patient returns to prior setting with prior services. Patient Diagnosis(es): There were no encounter diagnoses. Past Medical History:  has a past medical history of AAA (abdominal aortic aneurysm) ([facility] AAA (abdominal aortic aneurysm) without rupture (HCC), Atrial fibrillation ([facility] CAD (coronary artery disease), CHF (congestive heart failure) ([facility] COPD (chronic obstructive pulmonary disease) ([facility] History of blood clots, Hyperlipidemia, and Hypertension. Past Surgical History:  has a past surgical history that includes Colonoscopy (07); Hysterectomy (); Appendectomy (); bladder suspension; Cataract removal; Tonsillectomy (as a child); tumor excision; Cholecystectomy (10/15/13); Colonoscopy (2017); joint replacement (2013); Abdominal aortic aneurysm repair; and Cardiac surgery. Treatment Diagnosis: Decreased: ADLs, fxl transfers/mobility      Assessment   Performance deficits / Impairments: Decreased functional mobility ; Decreased endurance;Decreased ADL status  Assessment: Pt is a 70 yo F admitted with with SOB and cough; found to have acute on chronic respiratory failure. PTA, pt lives in a mobile home w/ her  where she is typically independent in self-care, fxl mobility, and has assist for all homemaking. Pt reports she wears 2L O2 at night only. She is below baseline at this time, limited by worsening SOB and requiring 2L supplemental O2. Pt required mod A for bed mobility today - very effortful but  reports he assists as needed.  She completed fxl transfers SBA and fxl mobility w/ SBA/CGA. She completed toileting w/ SBA. Pt continues to desat into mid/high 80s on 2L O2 and requires increased time to recover. Anticipate pt will be safe to return home w/ assist PRN at d/c-may benefit from using 4WW more often for rest breaks. Treatment Diagnosis: Decreased: ADLs, fxl transfers/mobility  Prognosis: Good  REQUIRES OT FOLLOW-UP: Yes  Activity Tolerance  Activity Tolerance: Patient limited by fatigue  Activity Tolerance Comments: Pt desatted in mid/high 80s after ambulating to/from BR on 2L O2, requires cues for PLB and increased time to recover        Plan   Plan  Times per Week: 3-5  Times per Day: Daily  Current Treatment Recommendations: Strengthening, Balance training, ROM, Functional mobility training, Endurance training, Safety education & training, Self-Care / ADL     Restrictions  Restrictions/Precautions  Restrictions/Precautions: Fall Risk  Required Braces or Orthoses?: No  Position Activity Restriction  Other position/activity restrictions: 2L O2    Subjective   General  Chart Reviewed: Yes  Patient assessed for rehabilitation services?: Yes  Additional Pertinent Hx: per H&P: \"69year-old female past medical history significant for atrial fibrillation, CHF, CAD daily, dyslipidemia presents to the emergency department complaints of shortness of breath and cough. Patient reports shortness of breath and cough for the last 3 days associated with weakness. Subjective fever and chill. Denies chest pain, lower extremity swelling. \"  Family / Caregiver Present: No  Referring Practitioner: Get Quinn MD  Diagnosis: Acute on chronic hypoxic respiratory failure  Subjective  Subjective: Pt met b/s for OT. Pt in bed, agreeable to therapy. Pt denied pain       Objective   Safety Devices  Type of Devices: Call light within reach; Chair alarm in place; Left in chair;Gait belt;Patient at risk for falls  Balance  Sitting: Intact  Standing: Intact (SBA)  Gait  Overall Level of Assistance: Stand-by assistance;Contact-guard assistance (Fxl mobility from bed > BR w/ initial CGA, then from BR > recliner w/ SBA. SOB throughout, no LOB noted)  Toilet Transfers  Toilet - Technique: Ambulating  Equipment Used: Grab bars  Toilet Transfer: Stand by assistance     ADL  Toileting: Stand by assistance  Toileting Skilled Clinical Factors: OT removed purewick and educated pt not to rely on during the day so that pt is getting up to the bathroom regularly. Pt voided urine from commode, completed pericare and managed briefs w/ SBA        Bed mobility  Supine to Sit: Moderate assistance (Effortful)  Sit to Supine: Unable to assess (in recliner at end of session)  Scooting: Stand by assistance  Transfers  Sit to stand: Stand by assistance  Stand to sit: Stand by assistance     Cognition  Overall Cognitive Status: WFL  Cognition Comment: Mild insight concerns  Orientation  Overall Orientation Status: Within Functional Limits                  Education Given To: Patient  Education Provided: Role of Therapy;Transfer Training;ADL Adaptive Strategies; Energy Conservation  Education Provided Comments: Pursed lip breathing  Education Method: Demonstration;Verbal  Barriers to Learning: None  Education Outcome: Verbalized understanding        AM-PAC Score  AM-[facility] Inpatient Daily Activity Raw Score: 20 (07/29/22 1121)  AM-PAC Inpatient ADL T-Scale Score : 42.03 (07/29/22 1121)  ADL Inpatient CMS 0-100% Score: 38.32 (07/29/22 1121)  ADL Inpatient CMS G-Code Modifier : CJ (07/29/22 1121)      Goals  Short Term Goals  Time Frame for Short term goals: Prior to d/c; goals ongoing  Short Term Goal 1: Pt will complete ADL transfers mod I  Short Term Goal 2: Pt will toilet w/ supervision  Short Term Goal 3: Pt will bathe w/ supervision  Short Term Goal 4: Pt will dress w/ min A  Short Term Goal 5: Pt will tolerate standing x2-3 mins during ADL task w/ O2 sats remaining 88% or above  Long Term Goals  Time Frame for Long term goals : LTG=STG  Patient Goals   Patient goals : to go home       Therapy Time   Individual Concurrent Group Co-treatment   Time In 1030         Time Out [address]         Minutes [address] [place], [place] [address]

## 2022-07-29 NOTE — CONSULTS
REASON FOR CONSULTATION/CC: Hypoxemic and hypercapnic respiratory failure      Consult at request of Get Quinn, *     PCP: Mai Morales MD  Established Pulmonologist: Alessia Adkins    Chief Complaint   Patient presents with    Shortness of Breath       HISTORY OF PRESENT ILLNESS: Clark Peacock is a 69y.o. year old female with a history of severe COPD, reports been on oxygen at night, CHF who presents with progressive shortness of breath. Symptoms have been present for greater than 48 hours. Began insidiously and progressively worsened. Has not found anything that makes it better or worse. During my visit she is on supplemental oxygen. Past Medical History:   Diagnosis Date    AAA (abdominal aortic aneurysm) ([address]     pt states it is 4cm    AAA (abdominal aortic aneurysm) without rupture (HCC) 2/10/2015    Atrial fibrillation (HCC)     CAD (coronary artery disease)     CHF (congestive heart failure) (HCC)     COPD (chronic obstructive pulmonary disease) ([address]     History of blood clots     Hyperlipidemia     Hypertension          Past Surgical History:   Procedure Laterality Date    ABDOMINAL AORTIC ANEURYSM REPAIR      Endovascular abdominal AA    APPENDECTOMY  1990    incidental    BLADDER SUSPENSION      CARDIAC SURGERY      CATARACT REMOVAL      CHOLECYSTECTOMY  10/15/13    COLONOSCOPY  9/2/07    dr Jarrell Morgan and check in 5 years. COLONOSCOPY  06/16/2017    ok dr arndt, repeat 5 years    HYSTERECTOMY ([address] [facility] [address])  1990    for benign tumor. just the uterus    JOINT REPLACEMENT  12/2013    right knee replacement    TONSILLECTOMY  as a child    TUMOR EXCISION      benign behind right ear about 2008       Family Hx  family history includes Heart Disease in her father; Hypertension in an other family member. Social Hx   reports that she quit smoking about 8 years ago. Her smoking use included cigarettes. She started smoking about 45 years ago.  She has a 37.00 pack-year smoking history. She has never used smokeless tobacco.    Scheduled Meds:   furosemide  20 mg Oral Daily    cefepime  2,000 mg IntraVENous BID    dofetilide  250 mcg Oral Q12H    hydrALAZINE  10 mg Oral 3 times per day    isosorbide mononitrate  30 mg Oral Daily    metoprolol succinate  25 mg Oral Daily    lisinopril  20 mg Oral Daily    rivaroxaban  15 mg Oral Daily with breakfast    rosuvastatin  20 mg Oral Daily    mometasone-formoterol  2 puff Inhalation BID    sodium chloride flush  5-40 mL IntraVENous 2 times per day    doxycycline hyclate  100 mg Oral 2 times per day    predniSONE  40 mg Oral Daily       Continuous Infusions:   sodium chloride         PRN Meds:  sodium chloride flush, sodium chloride, polyethylene glycol, acetaminophen **OR** acetaminophen    ALLERGIES:  Patient is allergic to morphine. REVIEW OF SYSTEMS:  Constitutional: Negative for fever  HENT: Negative for sore throat  Eyes: Negative for redness   Respiratory: Negative for dyspnea, cough  Cardiovascular: Negative for chest pain  Gastrointestinal: Negative for vomiting, diarrhea   Genitourinary: Negative for hematuria   Musculoskeletal: Negative for arthralgias   Skin: Negative for rash  Neurological: Negative for syncope  Hematological: Negative for adenopathy  Psychiatric/Behavorial: Negative for anxiety    Objective:   PHYSICAL EXAM:  Blood pressure (!) 156/71, pulse 58, temperature 97.5 °F (36.4 °C), temperature source Oral, resp. rate 18, height 5' 4\" (1.626 m), weight 226 lb 6.6 oz (102.7 kg), SpO2 99 %, not currently breastfeeding.'  Gen:  No acute distress. Eyes: PERRL. Anicteric sclera. No conjunctival injection. ENT: No discharge. Posterior oropharynx clear. External appearance of ears and nose normal.  Neck: Trachea midline. No mass   Resp:  No crackles. No wheezes. No rhonchi. No dullness on percussion. CV: Regular rate. Regular rhythm. No murmur or rub. No edema. GI: Soft, Non-tender. Non-distended.  +BS  Skin: Warm, dry, w/o erythema. Lymph: No cervical or supraclavicular LAD. M/S: No cyanosis. No clubbing. Neuro:  no focal neurologic deficit. Moves all extremities  Psych: Awake and alert, Oriented x 3. Judgement and insight appropriate. Mood stable. Data Reviewed:   LABS:  CBC:   Recent Labs     07/27/22  1105 07/28/22  0740 07/29/22  0616   WBC 16.8* 17.2* 14.1*   HGB 10.2* 10.4* 10.3*   HCT 31.2* 31.8* 31.0*   MCV 89.6 89.4 87.7    169 189     BMP:   Recent Labs     07/27/22  1105 07/28/22  0740 07/29/22  0616    135* 135*   K 4.3 4.5 4.4   CL 96* 95* 97*   CO2 30 30 30   BUN 24* 35* 47*   CREATININE 1.1 1.3* 1.7*     LIVER PROFILE:   Recent Labs     07/27/22  0118   AST 11*   ALT 10   BILITOT 0.6   ALKPHOS 73     PT/INR: No results for input(s): PROTIME, INR in the last 72 hours. APTT: No results for input(s): APTT in the last 72 hours. UA:No results for input(s): NITRITE, COLORU, PHUR, LABCAST, WBCUA, RBCUA, MUCUS, TRICHOMONAS, YEAST, BACTERIA, CLARITYU, SPECGRAV, LEUKOCYTESUR, UROBILINOGEN, BILIRUBINUR, BLOODU, GLUCOSEU, AMORPHOUS in the last 72 hours. Invalid input(s): KETONESU  No results for input(s): PHART, TOJ9XXT, PO2ART in the last 72 hours. Radiology Review:  Pertinent images / reports were reviewed as a part of this visit. Chest x-ray images reviewed personally by me, interpretation as follows:  -Bilateral lower lobe opacities. Consistent with fluid overload versus possible infectious pneumonia. ECHO  Summary   Mod conc. LVH with normal LV size & wall motion. EF is   60%. Diastolic   filling parameters suggest grade II diastolic dysfunction. The left atrium is severely dilated. Normal right ventricular size and function. Trivial mitral, aortic and tricuspid regurgitation.       Assessment/Plan:       Healthcare associated pneumonia  Cefepime/doxycycline    COPD  Dulera, albuterol PRN    Chronic hypoxemic respiratory failure  -Tolerating home 2 L oxygen requirement    Acute on chronic diastolic congestive heart failure  -Elevated BNP, concern for pulmonary edema has been on Lasix    MARK  -Slowly rising creatinine could be due to diuretics. This note was transcribed using [id number] [employer]. Please disregard any translational errors.     Thank you for the consult    [address]  Pulmonary, Sleep and Critical Care Medicine

## 2022-07-29 NOTE — CONSULTS
PALLIATIVE MEDICINE CONSULTATION     Patient name:Janae Bess   ZDT:[id number]    :1945  Room/Bed:J6U-8359/4265-01   LOS: 2 days         Date of consult:2022    Consult Information    Inpatient consult to Palliative Care  Consult performed by: CHRISTINE Henderson CNP  Consult ordered by: Sommer Torres MD       Reason for Consult: Goals of Care, Code Status, 30 day readmit    ASSESSMENT/RECOMMENDATIONS     69 y.o. female with dyspnea and debility. Symptom Management:  Dyspnea: Patient on 2 liters of oxygen via a nasal cannula for relief today. Debility: Patient pale and lethargic during my visit today. Patient was oriented x 4 today. Patient exhibited some bilateral extremity weakness during my visit today. Patient is requiring some assistance with her ADL's at this time. Goals of Care: Met patient and her spouse Tamyrel at the bedside today. Patient was oriented x 4 and appeared to be decisional today. Reviewed patient's current health status, goals of care and code status. Patient indicated she would like to continue with all aggressive treatment at this time (she hopes to return home soon). Patient also indicated she would like to meet with [facility] post her hospital stay to assist with in home care. Code status was reviewed and the patient wishes to remain a Full Code at this time. Patient/Family Goals of Care :     - Met patient and her spouse Shirrel at the bedside today. Patient was oriented x 4 and appeared to be decisional today. Reviewed patient's current health status, goals of care and code status. Patient indicated she would like to continue with all aggressive treatment at this time (she hopes to return home soon). Patient also indicated she would like to meet with [facility] post her hospital stay to assist with in home care. Code status was reviewed and the patient wishes to remain a Full Code at this time. hospital medications reviewed in chart as of 7/29/2022     EXAM     Vitals:    07/29/22 0855   BP: (!) 152/58   Pulse: 62   Resp: 20   Temp: 97.6 °F (36.4 °C)   SpO2: 97%       Physical Exam  Vitals reviewed. Constitutional:       Appearance: She is ill-appearing. Interventions: Nasal cannula in place. HENT:      Head: Normocephalic and atraumatic. Nose: Nose normal.   Eyes:      Extraocular Movements: Extraocular movements intact. Pupils: Pupils are equal, round, and reactive to light. Cardiovascular:      Rate and Rhythm: Normal rate. Pulses: Normal pulses. Pulmonary:      Comments: Breath sounds diminished bilaterally today. Abdominal:      General: Bowel sounds are normal.      Palpations: Abdomen is soft. Musculoskeletal:      Cervical back: Neck supple. Right lower leg: Edema (1+) present. Left lower leg: Edema (1+) present. Skin:     General: Skin is warm and dry. Coloration: Skin is pale. Neurological:      Comments: Oriented x4   Psychiatric:         Behavior: Behavior normal.         Thought Content:  Thought content normal.         Judgment: Judgment normal.          Current labs in the epic chart reviewed as of 7/29/2022   Review of previous notes, admits, labs, radiology and testing relevant to this consult done in this chart today 7/29/2022      Total time: 78 minutes  >50% of time spent counseling patient at bedside or POA/family member if applicable , reviewing information and discussing care, coordinating with care team  Signed By: Electronically signed by CHRISTINE Romero CNP on 7/29/2022 at 3:23 PM  Palliative Medicine   [phone]    July 29, 2022

## 2022-07-29 NOTE — DISCHARGE INSTR - COC
Problems:  Patient Active Problem List   Diagnosis Code    Primary hypertension I10    Hyperlipidemia E78.5    Coronary artery disease I25.10    DENISE (obstructive sleep apnea) G47.33    History of DVT (deep vein thrombosis) Z86.718    Family history of DVT Z82.49    AAA (abdominal aortic aneurysm) without rupture ([facility]) I71.4    Pleural effusion, left J90    Pleural effusion J90    COPD exacerbation ([facility]) J44.1    Pelvic pain in female R10.2    Vitamin D deficiency E55.9    Other emphysema ([facility]) J43.8    Acute bronchitis J20.9    Acute respiratory failure with hypoxia ([facility]) J96.01    Abnormal chest x-ray R93.89    Atypical pneumonia J18.9    Atrial fibrillation with RVR ([facility]) I48.91    Chronic diastolic heart failure ([facility]) I50.32    PVD (peripheral vascular disease) ([facility]) I73.9    Abnormal nuclear stress test R94.39    AAA (abdominal aortic aneurysm) ([facility]) I71.4    PAF (paroxysmal atrial fibrillation) ([facility]) I48.0    Endoleak post (EVAR) endovascular aneurysm repair, 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    Carotid artery stenosis without cerebral infarction, bilateral I65.23    History of repair of aneurysm of abdominal aorta using endovascular stent graft Z95.828    Atherosclerotic heart disease of native coronary artery with other forms of angina pectoris ([address] I25.118    Morbidly obese ([facility]) E66.01    COPD, severe ([address] J44.9    Hypertensive crisis I16.9    Acute on chronic diastolic congestive heart failure ([address] I50.33    Respiratory failure ([address] J96.90    Left atrial flutter by electrocardiogram ([address] I48.92    Persistent atrial fibrillation ([facility]) I48.19    A-fib ([address] I48.91    Obesity (BMI 30-39. 9) E66.9    Atrial fibrillation ([facility]) I48.91    SOB (shortness of breath) R06.02    Chronic systolic (congestive) heart failure I50.22    Pneumonia J18.9       Isolation/Infection:   Isolation            No Isolation          Patient Infection Status       Infection Onset Added Last Indicated Last Indicated By Review Planned Expiration Resolved Resolved By    None active    Resolved    COVID-19 (Rule Out) 07/26/22 07/26/22 07/26/22 COVID-19, Rapid (Ordered)   07/26/22 Rule-Out Test Resulted    COVID-19 (Rule Out) 09/24/20 09/24/20 09/24/20 COVID-19 (Ordered)   09/25/20 Lissette Carlin, RN            Nurse Assessment:  Last Vital Signs: BP (!) 152/58   Pulse 62   Temp 97.6 °F (36.4 °C) (Oral)   Resp 20   Ht 5' 4\" (1.626 m)   Wt 226 lb 6.6 oz (102.7 kg)   SpO2 97%   BMI 38.86 kg/m²     Last documented pain score (0-10 scale): Pain Level: 0  Last Weight:   Wt Readings from Last 1 Encounters:   07/29/22 226 lb 6.6 oz (102.7 kg)     Mental Status:  oriented and alert    IV Access:  - None    Nursing Mobility/ADLs:  Walking   Assisted  Transfer  Assisted  Bathing  Assisted  Dressing  Assisted  Toileting  Assisted  Feeding  Independent  Med Admin  Assisted  Med Delivery   whole    Wound Care Documentation and Therapy:  Puncture 10/07/20 Groin Anterior;Right (Active)   Number of days: 660        Elimination:  Continence: Bowel: Yes  Bladder: Yes  Urinary Catheter: None   Colostomy/Ileostomy/Ileal Conduit: No       Date of Last BM: 8/1/2022    Intake/Output Summary (Last 24 hours) at 7/29/2022 1623  Last data filed at 7/29/2022 1618  Gross per 24 hour   Intake 700 ml   Output 1000 ml   Net -300 ml     I/O last 3 completed shifts: In: 26 [P.O.:1176; IV Piggyback:50]  Out: 2200 [Urine:2200]    Safety Concerns: At Risk for Falls    Impairments/Disabilities:      None    Nutrition Therapy:  Current Nutrition Therapy:   - Oral Diet:  Carb Control 4 carbs/meal (1800kcals/day) and Low Sodium (2gm)    Routes of Feeding: Oral  Liquids: No Restrictions  Daily Fluid Restriction: yes - amount 1500  Last Modified Barium Swallow with Video (Video Swallowing Test): not done    Treatments at the Time of Hospital Discharge:   Respiratory Treatments: na  Oxygen Therapy:  is on oxygen at 2 L/min per nasal cannula.   Ventilator:    - No ventilator support    Rehab Therapies: Physical Therapy and Occupational Therapy  Weight Bearing Status/Restrictions: No weight bearing restrictions  Other Medical Equipment (for information only, NOT a DME order):  walker  Other Treatments: [facility]: LEVEL 1 [address] agency to establish plan of care for patient over 60 day period   Nursing  Initial home SN evaluation visit to occur within 24-48 hours for:  medication management  VS and clinical assessment  S&S chronic disease exacerbation education + when to contact MD / NP  care coordination  Medication Reconciliation during 1st SN visit       PT/OT   Evaluate with goal of regaining prior level of functioning   OT to evaluate if patient has [address] needs for personal care      PCP Visit scheduled within 7 days of hospital discharge   Heart Failure Instructions for Daily Management  Patient was treated for acute on chronic diastolic heart failure. she  will require the following:    Please weigh daily on the same scale and approximately the same time of day. Report weight gain of 3 pounds/day or 5 pounds/week to : Haris Sutton [phone] and Judy [phone]. Please use hospital discharge weight as baseline reference. Please monitor for signs and symptoms of and report to MD:  Worsening Heart Failure: sudden weight gain, shortness of breath, lower extremity or general edema/swelling, abdominal bloating/swelling, inability to lie flat, intolerance to usual activity, or cough (especially at night). Report these finding even if no increase in weight. Dehydration:  having difficulty or a decrease in urination, dizziness, worsening fatigue, or new onset/worsening of generalized weakness. Please continue a LOW SODIUM diet and LIMIT fluid intake to 48 - 64 ounces ( 1.5 - 2 liters) per day.      Call Haris Sutton [phone] and Judy [phone] and/or Chiki Canales @ [phone] with any questions or concerns. Please continue heart failure education to patient and family/support system. See After Visit Summary for hospital follow up appointment details. Consider spiritual care referral for support and/or completion of advance directives [phone]. Consider: [place] [address] telehealth program if patient agreeable and able to participate, palliative care consult for ongoing goals of care, end of life, and/or chronic disease management discussions, and referral to [facility] [phone]) once SNF/HHC complete. Patient's personal belongings (please select all that are sent with patient):  None    RN SIGNATURE:  Electronically signed by Karyna Cope RN on 8/1/22 at 3:10 PM EDT    CASE MANAGEMENT/SOCIAL WORK SECTION    Inpatient Status Date: 7/27/22    Readmission Risk Assessment Score:  Readmission Risk              Risk of Unplanned Readmission:  [id number]           Discharging to Facility/ Agency   Name:  [address] [place] [address]    Address: [address] [place] [address]  Phone: [phone]  Fax: [phone]      / signature: Electronically signed by Jay Beaulieu RN on 7/29/22 at 4:25 PM EDT    PHYSICIAN SECTION    Prognosis: {Prognosis:9073912393}    Condition at Discharge: [address] [place] [address] Patient Condition:[id number]}    Rehab Potential (if transferring to Rehab): {Prognosis:5525140279}    Recommended Labs or Other Treatments After Discharge: ***    Physician Certification: I certify the above information and transfer of Wes Sanchez  is necessary for the continuing treatment of the diagnosis listed and that she requires {Admit to Appropriate Level of Care:39221} for {GREATER/LESS:622960559} 30 days.      Update Admission H&P: {CHP DME Changes in VMROS:279915075}    PHYSICIAN SIGNATURE:  {Esignature:898304093}

## 2022-07-29 NOTE — PROGRESS NOTES
Hospitalist Progress Note      PCP: Mai Morales MD    Date of Admission: 7/26/2022    Chief Complaint:   Chief Complaint   Patient presents with    Shortness of [address] Course: 72-year-old female past medical history significant for atrial fibrillation, CHF, CAD daily, dyslipidemia presents to the emergency department complaints of shortness of breath and cough. Patient reports shortness of breath and cough for the last 3 days associated with weakness. Subjective fever and chill. Denies chest pain, lower extremity swelling.     7/28  She reports feeling better. She is requesting to see her pulmonologist while she is in the hospital.    7/29  Patient is feeling slightly better. However her creatinine continues to increase, today is 1.7  compared to 1.3 yesterday.     Subjective: as above      Medications:  Reviewed    Infusion Medications    sodium chloride      sodium chloride       Scheduled Medications    amLODIPine  10 mg Oral Nightly    [START ON 7/30/2022] isosorbide mononitrate  60 mg Oral Daily    cefepime  2,000 mg IntraVENous BID    dofetilide  250 mcg Oral Q12H    metoprolol succinate  25 mg Oral Daily    rivaroxaban  15 mg Oral Daily with breakfast    rosuvastatin  20 mg Oral Daily    mometasone-formoterol  2 puff Inhalation BID    sodium chloride flush  5-40 mL IntraVENous 2 times per day    doxycycline hyclate  100 mg Oral 2 times per day    predniSONE  40 mg Oral Daily     PRN Meds: albuterol sulfate HFA, cloNIDine, sodium chloride flush, sodium chloride, polyethylene glycol, acetaminophen **OR** acetaminophen      Intake/Output Summary (Last 24 hours) at 7/29/2022 1232  Last data filed at 7/29/2022 1220  Gross per 24 hour   Intake 700 ml   Output 1200 ml   Net -500 ml         Physical Exam Performed:    BP (!) 152/58   Pulse 62   Temp 97.6 °F (36.4 °C) (Oral)   Resp 20   Ht 5' 4\" (1.626 m)   Wt 226 lb 6.6 oz (102.7 kg)   SpO2 97%   BMI 38.86 kg/m²     General appearance: No apparent distress, appears stated age and cooperative. HEENT: Pupils equal, round, and reactive to light. Conjunctivae/corneas clear. Neck: Supple, with full range of motion. No jugular venous distention. Trachea midline. Respiratory:  Normal respiratory effort. Clear to auscultation, bilaterally without Rales/Wheezes/Rhonchi. Cardiovascular: Regular rate and rhythm with normal S1/S2 without murmurs, rubs or gallops. Abdomen: Soft, non-tender, non-distended with normal bowel sounds. Musculoskeletal: No clubbing, cyanosis or edema bilaterally. Full range of motion without deformity. Skin: Skin color, texture, turgor normal.  No rashes or lesions. Neurologic:  Neurovascularly intact without any focal sensory/motor deficits. Cranial nerves: II-XII intact, grossly non-focal.  Psychiatric: Alert and oriented, thought content appropriate, normal insight  Capillary Refill: Brisk,3 seconds, normal   Peripheral Pulses: +2 palpable, equal bilaterally       Labs:   Recent Labs     07/27/22  1105 07/28/22  0740 07/29/22  0616   WBC 16.8* 17.2* 14.1*   HGB 10.2* 10.4* 10.3*   HCT 31.2* 31.8* 31.0*    169 189       Recent Labs     07/27/22  1105 07/28/22  0740 07/29/22  0616    135* 135*   K 4.3 4.5 4.4   CL 96* 95* 97*   CO2 30 30 30   BUN 24* 35* 47*   CREATININE 1.1 1.3* 1.7*   CALCIUM 8.9 9.0 8.9       Recent Labs     07/27/22  0118   AST 11*   ALT 10   BILITOT 0.6   ALKPHOS 73       No results for input(s): INR in the last 72 hours.   Recent Labs     07/26/22  2257   TROPONINI <0.01         Urinalysis:      Lab Results   Component Value Date/Time    NITRU Negative 09/09/2021 11:52 AM    WBCUA 1 09/09/2021 11:52 AM    BACTERIA 1+ 09/24/2020 06:30 PM    RBCUA 1 09/09/2021 11:52 AM    BLOODU TRACE 09/09/2021 11:52 AM    SPECGRAV 1.010 09/09/2021 11:52 AM    GLUCOSEU Negative 09/09/2021 11:52 AM       Radiology:  XR CHEST PORTABLE   Final Result   New left suprahilar ground-glass opacity may represent developing   consolidation in the appropriate clinical setting. US RENAL COMPLETE    (Results Pending)   XR CHEST PORTABLE    (Results Pending)           Assessment/Plan:    Active Hospital Problems    Diagnosis     Pneumonia [J18.9]      Priority: Medium    Acute respiratory failure with hypoxia (HCC) [J96.01]        Acute kidney injury   Likely a combination of nephrotoxicity from medication including lisinopril and Lasix  Hold both  Check urinalysis  Check renal ultrasound  Consult nephrology  Normal saline infusion at 50 cc an hour    Acute on chronic hypoxic respiratory failure  Sepsis due to Pneumonia    COPD exacerbation  Patient only uses 2 L oxygen at nighttime. Chest x-ray with left lower lobe infiltrate  Ceftriaxone and doxycycline for 5 days  Schedule bronchodilators  Oral prednisone  Perform home O2 evaluation  Consult pulmonologist for recommendations     Acute on chronic diastolic heart failure  last ECHO: 9/2021 EF 97% Grade 2 Diastolic Dysfunction  Appears to be fairly compensated  Hold Lasix due to MARK, hold lisinopril due to MARK     Atrial Fibrillation   - currently rate controlled  - continue Xarelto, Dofetilide and metoprolol   EP following     Essential (primary) hypertension  - monitor blood pressure  - continue home meds     Hyperlipidemia   - continue statin     DVT Prophylaxis: Xarelto  Diet: ADULT DIET; Regular; 4 carb choices (60 gm/meal);  Low Sodium (2 gm); 1500 ml  Code Status: Full Code     PT/OT Eval Status: Requested     Dispo -return home    Sandra Caro MD

## 2022-07-30 LAB
ANION GAP SERPL CALCULATED.3IONS-SCNC: 11 MMOL/L (ref 3–16)
BASOPHILS ABSOLUTE: 0 K/UL (ref 0–0.2)
BASOPHILS RELATIVE PERCENT: 0.1 %
BUN BLDV-MCNC: 49 MG/DL (ref 7–20)
CALCIUM SERPL-MCNC: 9.3 MG/DL (ref 8.3–10.6)
CHLORIDE BLD-SCNC: 101 MMOL/L (ref 99–110)
CO2: 29 MMOL/L (ref 21–32)
CREAT SERPL-MCNC: 1.3 MG/DL (ref 0.6–1.2)
EOSINOPHILS ABSOLUTE: 0.2 K/UL (ref 0–0.6)
EOSINOPHILS RELATIVE PERCENT: 1.4 %
GFR AFRICAN AMERICAN: 48
GFR NON-AFRICAN AMERICAN: 40
GLUCOSE BLD-MCNC: 96 MG/DL (ref 70–99)
HCT VFR BLD CALC: 33.3 % (ref 36–48)
HEMOGLOBIN: 10.9 G/DL (ref 12–16)
LYMPHOCYTES ABSOLUTE: 1.6 K/UL (ref 1–5.1)
LYMPHOCYTES RELATIVE PERCENT: 13.9 %
MCH RBC QN AUTO: 29 PG (ref 26–34)
MCHC RBC AUTO-ENTMCNC: 32.7 G/DL (ref 31–36)
MCV RBC AUTO: 88.7 FL (ref 80–100)
MONOCYTES ABSOLUTE: 1 K/UL (ref 0–1.3)
MONOCYTES RELATIVE PERCENT: 8.9 %
NEUTROPHILS ABSOLUTE: 8.7 K/UL (ref 1.7–7.7)
NEUTROPHILS RELATIVE PERCENT: 75.7 %
PDW BLD-RTO: 15.9 % (ref 12.4–15.4)
PLATELET # BLD: 217 K/UL (ref 135–450)
PMV BLD AUTO: 9.1 FL (ref 5–10.5)
POTASSIUM SERPL-SCNC: 3.7 MMOL/L (ref 3.5–5.1)
RBC # BLD: 3.76 M/UL (ref 4–5.2)
SODIUM BLD-SCNC: 141 MMOL/L (ref 136–145)
WBC # BLD: 11.5 K/UL (ref 4–11)

## 2022-07-30 PROCEDURE — 6370000000 HC RX 637 (ALT 250 FOR IP): Performed by: STUDENT IN AN ORGANIZED HEALTH CARE EDUCATION/TRAINING PROGRAM

## 2022-07-30 PROCEDURE — 6370000000 HC RX 637 (ALT 250 FOR IP): Performed by: INTERNAL MEDICINE

## 2022-07-30 PROCEDURE — 94669 MECHANICAL CHEST WALL OSCILL: CPT

## 2022-07-30 PROCEDURE — 6360000002 HC RX W HCPCS: Performed by: INTERNAL MEDICINE

## 2022-07-30 PROCEDURE — 99233 SBSQ HOSP IP/OBS HIGH 50: CPT | Performed by: INTERNAL MEDICINE

## 2022-07-30 PROCEDURE — 2580000003 HC RX 258: Performed by: INTERNAL MEDICINE

## 2022-07-30 PROCEDURE — 80048 BASIC METABOLIC PNL TOTAL CA: CPT

## 2022-07-30 PROCEDURE — 94760 N-INVAS EAR/PLS OXIMETRY 1: CPT

## 2022-07-30 PROCEDURE — 1200000000 HC SEMI PRIVATE

## 2022-07-30 PROCEDURE — 6370000000 HC RX 637 (ALT 250 FOR IP): Performed by: NURSE PRACTITIONER

## 2022-07-30 PROCEDURE — 94640 AIRWAY INHALATION TREATMENT: CPT

## 2022-07-30 PROCEDURE — 85025 COMPLETE CBC W/AUTO DIFF WBC: CPT

## 2022-07-30 PROCEDURE — 2700000000 HC OXYGEN THERAPY PER DAY

## 2022-07-30 PROCEDURE — 2580000003 HC RX 258: Performed by: STUDENT IN AN ORGANIZED HEALTH CARE EDUCATION/TRAINING PROGRAM

## 2022-07-30 PROCEDURE — 36415 COLL VENOUS BLD VENIPUNCTURE: CPT

## 2022-07-30 RX ADMIN — MOMETASONE FUROATE AND FORMOTEROL FUMARATE DIHYDRATE 2 PUFF: 200; 5 AEROSOL RESPIRATORY (INHALATION) at 20:08

## 2022-07-30 RX ADMIN — ALBUTEROL SULFATE 2 PUFF: 90 AEROSOL, METERED RESPIRATORY (INHALATION) at 07:45

## 2022-07-30 RX ADMIN — MOMETASONE FUROATE AND FORMOTEROL FUMARATE DIHYDRATE 2 PUFF: 200; 5 AEROSOL RESPIRATORY (INHALATION) at 07:45

## 2022-07-30 RX ADMIN — SODIUM CHLORIDE, PRESERVATIVE FREE 10 ML: 5 INJECTION INTRAVENOUS at 20:22

## 2022-07-30 RX ADMIN — DOFETILIDE 250 MCG: 0.25 CAPSULE ORAL at 09:03

## 2022-07-30 RX ADMIN — PREDNISONE 40 MG: 20 TABLET ORAL at 09:03

## 2022-07-30 RX ADMIN — DOFETILIDE 250 MCG: 0.25 CAPSULE ORAL at 20:22

## 2022-07-30 RX ADMIN — ISOSORBIDE MONONITRATE 60 MG: 60 TABLET, EXTENDED RELEASE ORAL at 09:03

## 2022-07-30 RX ADMIN — CEFEPIME 2000 MG: 2 INJECTION, POWDER, FOR SOLUTION INTRAVENOUS at 20:28

## 2022-07-30 RX ADMIN — DOXYCYCLINE HYCLATE 100 MG: 100 TABLET, COATED ORAL at 09:03

## 2022-07-30 RX ADMIN — ACETAMINOPHEN 650 MG: 325 TABLET ORAL at 20:23

## 2022-07-30 RX ADMIN — Medication 6 MG: at 20:23

## 2022-07-30 RX ADMIN — CEFEPIME 2000 MG: 2 INJECTION, POWDER, FOR SOLUTION INTRAVENOUS at 09:07

## 2022-07-30 RX ADMIN — METOPROLOL SUCCINATE 25 MG: 25 TABLET, EXTENDED RELEASE ORAL at 09:03

## 2022-07-30 RX ADMIN — RIVAROXABAN 15 MG: 15 TABLET, FILM COATED ORAL at 09:03

## 2022-07-30 RX ADMIN — AMLODIPINE BESYLATE 10 MG: 10 TABLET ORAL at 20:22

## 2022-07-30 RX ADMIN — DOXYCYCLINE HYCLATE 100 MG: 100 TABLET, COATED ORAL at 20:21

## 2022-07-30 RX ADMIN — ROSUVASTATIN CALCIUM 20 MG: 20 TABLET, FILM COATED ORAL at 09:03

## 2022-07-30 NOTE — PLAN OF CARE
Problem: Discharge Planning  Goal: Discharge to home or other facility with appropriate resources  7/30/2022 0940 by Nanci Dozier RN  Outcome: Progressing  7/30/2022 0333 by Maciej Tirado RN  Outcome: Progressing     Problem: Safety - Adult  Goal: Free from fall injury  7/30/2022 0940 by Nanci Dozier RN  Outcome: Progressing  7/30/2022 0333 by Maciej Tirado RN  Outcome: Progressing     Problem: ABCDS Injury Assessment  Goal: Absence of physical injury  7/30/2022 0940 by Nanci Dozier RN  Outcome: Progressing  7/30/2022 0333 by Maciej Tirado RN  Outcome: Progressing     Problem: Respiratory - Adult  Goal: Achieves optimal ventilation and oxygenation  7/30/2022 0940 by Nanci Dozier RN  Outcome: Progressing  7/30/2022 0333 by Maciej Tirado RN  Outcome: Progressing     Problem: Chronic Conditions and Co-morbidities  Goal: Patient's chronic conditions and co-morbidity symptoms are monitored and maintained or improved  7/30/2022 0940 by Nanci Dozier RN  Outcome: Progressing  7/30/2022 0333 by Maciej Tirado RN  Outcome: Progressing     Problem: Skin/Tissue Integrity  Goal: Absence of new skin breakdown  Description: 1. Monitor for areas of redness and/or skin breakdown  2. Assess vascular access sites hourly  3. Every 4-6 hours minimum:  Change oxygen saturation probe site  4. Every 4-6 hours:  If on nasal continuous positive airway pressure, respiratory therapy assess nares and determine need for appliance change or resting period.   7/30/2022 0940 by Nanci Dozier RN  Outcome: Progressing  7/30/2022 0333 by Maciej Tirado RN  Outcome: Progressing

## 2022-07-30 NOTE — PLAN OF CARE
Problem: Discharge Planning  Goal: Discharge to home or other facility with appropriate resources  Outcome: Progressing     Problem: Safety - Adult  Goal: Free from fall injury  Outcome: Progressing     Problem: ABCDS Injury Assessment  Goal: Absence of physical injury  Outcome: Progressing     Problem: Respiratory - Adult  Goal: Achieves optimal ventilation and oxygenation  Outcome: Progressing     Problem: Chronic Conditions and Co-morbidities  Goal: Patient's chronic conditions and co-morbidity symptoms are monitored and maintained or improved  Outcome: Progressing     Problem: Skin/Tissue Integrity  Goal: Absence of new skin breakdown  Description: 1. Monitor for areas of redness and/or skin breakdown  2. Assess vascular access sites hourly  3. Every 4-6 hours minimum:  Change oxygen saturation probe site  4. Every 4-6 hours:  If on nasal continuous positive airway pressure, respiratory therapy assess nares and determine need for appliance change or resting period.   Outcome: Progressing

## 2022-07-30 NOTE — PROGRESS NOTES
Hospitalist Progress Note      PCP: Christian Meier MD    Date of Admission: 7/26/2022    Chief Complaint:   Chief Complaint   Patient presents with    Shortness of [address]: 73-year-old female past medical history significant for atrial fibrillation, CHF, CAD daily, dyslipidemia presents to the emergency department complaints of shortness of breath and cough. Patient reports shortness of breath and cough for the last 3 days associated with weakness. Subjective fever and chill. Admitted with COPD exacerbation/pneumonia as well as MARK    Subjective:   Patient reports improved shortness of breath, however stated that she is still not yet at baseline      Medications:  Reviewed    Infusion Medications    sodium chloride       Scheduled Medications    amLODIPine  10 mg Oral Nightly    isosorbide mononitrate  60 mg Oral Daily    cefepime  2,000 mg IntraVENous BID    dofetilide  250 mcg Oral Q12H    metoprolol succinate  25 mg Oral Daily    rivaroxaban  15 mg Oral Daily with breakfast    rosuvastatin  20 mg Oral Daily    mometasone-formoterol  2 puff Inhalation BID    sodium chloride flush  5-40 mL IntraVENous 2 times per day    doxycycline hyclate  100 mg Oral 2 times per day    predniSONE  40 mg Oral Daily     PRN Meds: albuterol sulfate HFA, cloNIDine, melatonin, sodium chloride flush, sodium chloride, polyethylene glycol, acetaminophen **OR** acetaminophen      Intake/Output Summary (Last 24 hours) at 7/30/2022 1456  Last data filed at 7/30/2022 0819  Gross per 24 hour   Intake 740 ml   Output 600 ml   Net 140 ml         Physical Exam Performed:    BP (!) 159/68   Pulse 65   Temp 98.3 °F (36.8 °C) (Oral)   Resp 16   Ht 5' 4\" (1.626 m)   Wt 229 lb 11.5 oz (104.2 kg)   SpO2 99%   BMI 39.43 kg/m²     General appearance: No apparent distress, appears stated age and cooperative. HEENT: Pupils equal, round, and reactive to light. Conjunctivae/corneas clear.   Neck: Supple, with full range of motion. No jugular venous distention. Trachea midline. Respiratory:  Normal respiratory effort. Clear to auscultation, bilaterally without Rales/Wheezes/Rhonchi. Cardiovascular: Regular rate and rhythm with normal S1/S2 without murmurs, rubs or gallops. Abdomen: Soft, non-tender, non-distended with normal bowel sounds. Musculoskeletal: No clubbing, cyanosis or edema bilaterally. Full range of motion without deformity. Skin: Skin color, texture, turgor normal.  No rashes or lesions. Neurologic:  Neurovascularly intact without any focal sensory/motor deficits. Cranial nerves: II-XII intact, grossly non-focal.  Psychiatric: Alert and oriented, thought content appropriate, normal insight  Capillary Refill: Brisk,3 seconds, normal   Peripheral Pulses: +2 palpable, equal bilaterally       Labs:   Recent Labs     07/28/22  0740 07/29/22  0616 07/30/22  0841   WBC 17.2* 14.1* 11.5*   HGB 10.4* 10.3* 10.9*   HCT 31.8* 31.0* 33.3*    189 217       Recent Labs     07/28/22  0740 07/29/22  0616 07/30/22  0841   * 135* 141   K 4.5 4.4 3.7   CL 95* 97* 101   CO2 30 30 29   BUN 35* 47* 49*   CREATININE 1.3* 1.7* 1.3*   CALCIUM 9.0 8.9 9.3       No results for input(s): AST, ALT, BILIDIR, BILITOT, ALKPHOS in the last 72 hours. No results for input(s): INR in the last 72 hours. No results for input(s): Magdalene Brooks in the last 72 hours. Urinalysis:      Lab Results   Component Value Date/Time    NITRU Negative 07/29/2022 02:43 PM    WBCUA 1 09/09/2021 11:52 AM    BACTERIA 1+ 09/24/2020 06:30 PM    RBCUA 1 09/09/2021 11:52 AM    BLOODU Negative 07/29/2022 02:43 PM    SPECGRAV 1.014 07/29/2022 02:43 PM    GLUCOSEU Negative 07/29/2022 02:43 PM       Radiology:  XR CHEST PORTABLE   Final Result   Findings most consistent with mild CHF with mild perihilar edema and small   bilateral effusions. US RENAL COMPLETE   Final Result   No sonographic abnormality.          XR CHEST PORTABLE   Final Result   New left suprahilar ground-glass opacity may represent developing   consolidation in the appropriate clinical setting. Assessment/Plan:    Active Hospital Problems    Diagnosis     Pneumonia [J18.9]      Priority: Medium    Acute respiratory failure with hypoxia (HCC) [J96.01]        Acute kidney injury   Likely a combination of nephrotoxicity from medication including lisinopril and Lasix  Hold both  Creatinine 1.3 ( down from 1.7)   Check urinalysis  Check renal ultrasound  Consult nephrology  Normal saline infusion at 50 cc an hour    Acute on chronic hypoxic respiratory failure  Sepsis due to Pneumonia    COPD exacerbation  Patient only uses 2 L oxygen at nighttime. Chest x-ray with left lower lobe infiltrate  Cefepime and doxycycline, plan to complete 7 days  Schedule bronchodilators  Oral prednisone 40 mg p.o. for 5 days  Perform home O2 evaluation  Consult pulmonologist for recommendations     Acute on chronic diastolic heart failure  last ECHO: 9/2021 EF 40% Grade 2 Diastolic Dysfunction  Appears to be fairly compensated  Hold Lasix due to MARK, hold lisinopril due to MARK     Atrial Fibrillation   - currently rate controlled  - continue Xarelto, Dofetilide and metoprolol   EP following     Essential (primary) hypertension  - monitor blood pressure  - continue home meds     Hyperlipidemia   - continue statin     DVT Prophylaxis: Xarelto  Diet: ADULT DIET; Regular; 4 carb choices (60 gm/meal);  Low Sodium (2 gm); 1500 ml  Code Status: Full Code     PT/OT Eval Status: Requested     Dispo -return home    Aspen Miller MD

## 2022-07-30 NOTE — PROGRESS NOTES
Pulmonary Progress Note    Date of Admission: 7/26/2022   LOS: 3 days       CC:  Chief Complaint   Patient presents with    Shortness of Breath        Subjective:  Feeling better. Would like to be discharged. ROS:   No nausea  No Vomiting  No chest pain      Assessment:         Plan: This note may have been transcribed using [id number] [employer]. Please disregard any translational errors. Hospital Day: 3     Healthcare associated pneumonia  Leukocytosis improving with cefepime doxycycline. Plan for 7 days total    COPD  Dulera with albuterol as needed      Chronic hypoxemia  Baseline 2 L nasal cannula      Acute on chronic diastolic CHF, grade 2 diastolic function  Volume per nephrology      Acute kidney injury  Per nephrology          Data:        PHYSICAL EXAM:   Blood pressure (!) 159/68, pulse 65, temperature 98.3 °F (36.8 °C), temperature source Oral, resp. rate 16, height 5' 4\" (1.626 m), weight 229 lb 11.5 oz (104.2 kg), SpO2 99 %, not currently breastfeeding.'  Body mass index is 39.43 kg/m². Gen: No distress. ENT:   Resp: No accessory muscle use. No crackles. No wheezes. No rhonchi. CV: Regular rate. Regular rhythm. No murmur or rub. No edema. Skin: Warm, dry, normal texture and turgor. No nodule on exposed extremities. M/S: No cyanosis. No clubbing. No joint deformity. Psych: Oriented x 3. No anxiety. Awake. Alert. Intact judgement and insight. Good Mood / Affect.   Memory appears in tact       Medications:    Scheduled Meds:   amLODIPine  10 mg Oral Nightly    isosorbide mononitrate  60 mg Oral Daily    cefepime  2,000 mg IntraVENous BID    dofetilide  250 mcg Oral Q12H    metoprolol succinate  25 mg Oral Daily    rivaroxaban  15 mg Oral Daily with breakfast    rosuvastatin  20 mg Oral Daily    mometasone-formoterol  2 puff Inhalation BID    sodium chloride flush  5-40 mL IntraVENous 2 times per day    doxycycline hyclate  100 mg Oral 2 times per day    predniSONE  40 mg Oral Daily       Continuous Infusions:   sodium chloride         PRN Meds:  albuterol sulfate HFA, cloNIDine, melatonin, sodium chloride flush, sodium chloride, polyethylene glycol, acetaminophen **OR** acetaminophen    Labs reviewed:  CBC:   Recent Labs     07/28/22  0740 07/29/22  0616 07/30/22  0841   WBC 17.2* 14.1* 11.5*   HGB 10.4* 10.3* 10.9*   HCT 31.8* 31.0* 33.3*   MCV 89.4 87.7 88.7    189 217     BMP:   Recent Labs     07/28/22  0740 07/29/22  0616 07/30/22  0841   * 135* 141   K 4.5 4.4 3.7   CL 95* 97* 101   CO2 30 30 29   BUN 35* 47* 49*   CREATININE 1.3* 1.7* 1.3*     LIVER PROFILE: No results for input(s): AST, ALT, LIPASE, BILIDIR, BILITOT, ALKPHOS in the last 72 hours. Invalid input(s): AMYLASE,  ALB  PT/INR: No results for input(s): PROTIME, INR in the last 72 hours. APTT: No results for input(s): APTT in the last 72 hours. UA:  Recent Labs     07/29/22  1443   COLORU Yellow   PHUR 5.0   CLARITYU Clear   SPECGRAV 1.014   LEUKOCYTESUR Negative   UROBILINOGEN 0.2   BILIRUBINUR Negative   BLOODU Negative   GLUCOSEU Negative     No results for input(s): PH, PCO2, PO2 in the last 72 hours. Cx:      Films: This note was transcribed using [id number] [facility]. Please disregard any translational errors.       Hemalatha Okee[facility] [phone]

## 2022-07-30 NOTE — PROGRESS NOTES
[address] [place] [address] Nephrology   [facility]  [phone]  Nephrology progress Note          Patient ID: Kia Marina  Referring/ Physician: Kelly Paulino MD      HPI/Summary:   Kia Marina is being seen by nephrology for MARK and hyponatremia. This is a 56-year-old lady with past medical history significant for atrial fibrillation, CAD, CHF, hyperlipidemia, COPD and history of blood clots. She was admitted for shortness of breath. She has been having several days of shortness of breath with cough, feeling weak and fatigued. Said she was having fevers and chills. She has no chest pain no nausea no vomiting no diarrhea. No edema. Her breathing feels better. No edema on exam. Not using NSAIDs. Urine is dark but no blood or foamy frothy urine. , no dysuria.      Diuretics on hold  No significant pedal edema  On oxygen  Creatinine coming down  Lisinopril also on hold  Blood pressure is getting higher    Plan:   Currently on amlodipine isosorbide clonidine and metoprolol  Hold the IV fluids that will likely help blood pressure also    Acute kidney injury  Baseline creatinine appears to be 1.3-1.5  Her creatinine at time of consult is 1.7  Urinalysis shows trace blood 100 mg/dL protein, no hyaline casts 1 WBC 1 RBC  Urine output has been good  She does not have a Linder, confirm bladder scan no retention  She has not had any hypotensive episodes here, was very hypertensive on admission at 197/82  Renal ultrasound has been ordered but not done yet  She has received Vanco and cefepime, also received Lasix and lisinopril during this admission    Shortness of breath  Her chest x-ray shows a new left suprahilar groundglass opacity possibly consolidation, pneumonia  BNP is elevated at 4288  Last echo showed an EF about 81%, diastolic dysfunction grade 2 diastolic dysfunction left atrium was severely dilated  She received 3 doses of IV Lasix, held today for creatinine rising    Hypertension  She is on amlodipine 10 mg daily  Hydralazine was stopped she was on 10 mg 3 times daily  She is on lisinopril 20 mg daily, this is held with MARK  Imdur 60 mg daily and metoprolol 25 mg daily  She is on THE [facility] would like to thank you for the opportunity to serve this patient. Please call with any questions or concerns. Marvel Paul MD  [facility] Nephrology  [address]  [place], [address]  Fax: [phone]  Office: [phone]         CC/Reason for consult:   Reason for consult: MARK  Chief Complaint   Patient presents with    Shortness of Breath           Review of Systems:   [address] All other remaining systems are negative. Constitutional:  fever, chills, weakness, weight change, fatigue,      Skin:  rash, pruritus, hair loss, bruising, dry skin, petechiae. Head, Face, Neck   headaches, swelling,  cervical adenopathy. Respiratory: shortness of breath, cough, or wheezing  Cardiovascular: chest pain, palpitations, dizzy, edema  Gastrointestinal: nausea, vomiting, diarrhea, constipation,belly pain    Yellow skin, blood in stool  Musculoskeletal:  back pain, muscle weakness, gait problems,       joint pain or swelling. Genitourinary:  dysuria, poor urine flow, flank pain, blood in urine  Neurologic:  vertigo, TIA'S, syncope, seizures, focal weakness  Psychosocial:  insomnia, anxiety, or depression.   Additional positive findings: -     PMH/SH/FH:    Medical Hx: reviewed and updated as appropriate  Past Medical History:   Diagnosis Date    AAA (abdominal aortic aneurysm) ([address]     pt states it is 4cm    AAA (abdominal aortic aneurysm) without rupture ([address] 2/10/2015    Atrial fibrillation (HCC)     CAD (coronary artery disease)     CHF (congestive heart failure) (HCC)     COPD (chronic obstructive pulmonary disease) (HCC)     History of blood clots     Hyperlipidemia     Hypertension          Surgical Hx: reviewed and updated as appropriate   has a past surgical history that includes Colonoscopy (07); Hysterectomy (); Appendectomy (); bladder suspension; Cataract removal; Tonsillectomy (as a child); tumor excision; Cholecystectomy (10/15/13); Colonoscopy (2017); joint replacement (2013); Abdominal aortic aneurysm repair; and Cardiac surgery. Social Hx: reviewed and updated as appropriate  Social History     Tobacco Use    Smoking status: Former     Packs/day: 1.00     Years: 37.00     Pack years: 37.00     Types: Cigarettes     Start date: 1976     Quit date: 2013     Years since quittin.8    Smokeless tobacco: Never    Tobacco comments:     E cigarette now and then   Substance Use Topics    Alcohol use: No        Family hx: reviewed and updated as appropriate  family history includes Heart Disease in her father; Hypertension in an other family member. Medications:   amLODIPine, 10 mg, Nightly  isosorbide mononitrate, 60 mg, Daily  cefepime, 2,000 mg, BID  dofetilide, 250 mcg, Q12H  metoprolol succinate, 25 mg, Daily  rivaroxaban, 15 mg, Daily with breakfast  rosuvastatin, 20 mg, Daily  mometasone-formoterol, 2 puff, BID  sodium chloride flush, 5-40 mL, 2 times per day  doxycycline hyclate, 100 mg, 2 times per day  predniSONE, 40 mg, Daily     Morphine    Allergies: Allergies   Allergen Reactions    Morphine Rash     rash         Physical Exam/Objective:   Vitals:    22 1229   BP: (!) 159/68   Pulse: 65   Resp: 16   Temp: 98.3 °F (36.8 °C)   SpO2: 99%       Intake/Output Summary (Last 24 hours) at 2022 1230  Last data filed at 2022 0819  Gross per 24 hour   Intake 740 ml   Output 800 ml   Net -60 ml           General appearance:  in no acute distress, comfortable, communicative, awake and alert. HEENT: no icterus, EOM intact, trachea midline. Neck : no masses, appears symmetrical and no JVD appreciated. Respiratory: Respiratory effort normal, bilateral equal chest rise.  No wheeze, no crackles   Cardiovascular: Ausculation shows RRR and  no edema   Abdomen: abdomen is soft, non distended, no masses, no pain with palpation. Musculoskeletal:  no joint swelling, no deformity, strength grossly normal.   Skin: no rashes, no induration, no tightening, no jaundice   Neuro:   Follows commands, moves all extremities spontaneously   No edema    Data:   CBC:   Recent Labs     07/28/22  0740 07/29/22  0616 07/30/22  0841   WBC 17.2* 14.1* 11.5*   HGB 10.4* 10.3* 10.9*   HCT 31.8* 31.0* 33.3*    189 217       BMP:    Recent Labs     07/28/22  0740 07/29/22  0616 07/30/22  0841   * 135* 141   K 4.5 4.4 3.7   CL 95* 97* 101   CO2 30 30 29   BUN 35* 47* 49*   CREATININE 1.3* 1.7* 1.3*   GLUCOSE 115* 101* 96

## 2022-07-31 LAB
ANION GAP SERPL CALCULATED.3IONS-SCNC: 9 MMOL/L (ref 3–16)
BASOPHILS ABSOLUTE: 0 K/UL (ref 0–0.2)
BASOPHILS RELATIVE PERCENT: 0.3 %
BUN BLDV-MCNC: 45 MG/DL (ref 7–20)
CALCIUM SERPL-MCNC: 9.3 MG/DL (ref 8.3–10.6)
CHLORIDE BLD-SCNC: 100 MMOL/L (ref 99–110)
CO2: 31 MMOL/L (ref 21–32)
CREAT SERPL-MCNC: 1.2 MG/DL (ref 0.6–1.2)
EOSINOPHILS ABSOLUTE: 0.3 K/UL (ref 0–0.6)
EOSINOPHILS RELATIVE PERCENT: 1.9 %
GFR AFRICAN AMERICAN: 53
GFR NON-AFRICAN AMERICAN: 44
GLUCOSE BLD-MCNC: 87 MG/DL (ref 70–99)
HCT VFR BLD CALC: 33.5 % (ref 36–48)
HEMOGLOBIN: 10.8 G/DL (ref 12–16)
LYMPHOCYTES ABSOLUTE: 1.7 K/UL (ref 1–5.1)
LYMPHOCYTES RELATIVE PERCENT: 12.2 %
MCH RBC QN AUTO: 28.5 PG (ref 26–34)
MCHC RBC AUTO-ENTMCNC: 32.2 G/DL (ref 31–36)
MCV RBC AUTO: 88.7 FL (ref 80–100)
MONOCYTES ABSOLUTE: 1.1 K/UL (ref 0–1.3)
MONOCYTES RELATIVE PERCENT: 8 %
NEUTROPHILS ABSOLUTE: 10.8 K/UL (ref 1.7–7.7)
NEUTROPHILS RELATIVE PERCENT: 77.6 %
PDW BLD-RTO: 15.3 % (ref 12.4–15.4)
PLATELET # BLD: 251 K/UL (ref 135–450)
PMV BLD AUTO: 8.8 FL (ref 5–10.5)
POTASSIUM SERPL-SCNC: 4.4 MMOL/L (ref 3.5–5.1)
RBC # BLD: 3.77 M/UL (ref 4–5.2)
SODIUM BLD-SCNC: 140 MMOL/L (ref 136–145)
WBC # BLD: 13.9 K/UL (ref 4–11)

## 2022-07-31 PROCEDURE — 94760 N-INVAS EAR/PLS OXIMETRY 1: CPT

## 2022-07-31 PROCEDURE — 6370000000 HC RX 637 (ALT 250 FOR IP): Performed by: INTERNAL MEDICINE

## 2022-07-31 PROCEDURE — 6370000000 HC RX 637 (ALT 250 FOR IP): Performed by: NURSE PRACTITIONER

## 2022-07-31 PROCEDURE — 2580000003 HC RX 258: Performed by: STUDENT IN AN ORGANIZED HEALTH CARE EDUCATION/TRAINING PROGRAM

## 2022-07-31 PROCEDURE — 80048 BASIC METABOLIC PNL TOTAL CA: CPT

## 2022-07-31 PROCEDURE — 85025 COMPLETE CBC W/AUTO DIFF WBC: CPT

## 2022-07-31 PROCEDURE — 6370000000 HC RX 637 (ALT 250 FOR IP): Performed by: STUDENT IN AN ORGANIZED HEALTH CARE EDUCATION/TRAINING PROGRAM

## 2022-07-31 PROCEDURE — 94669 MECHANICAL CHEST WALL OSCILL: CPT

## 2022-07-31 PROCEDURE — 6360000002 HC RX W HCPCS: Performed by: INTERNAL MEDICINE

## 2022-07-31 PROCEDURE — 2700000000 HC OXYGEN THERAPY PER DAY

## 2022-07-31 PROCEDURE — 99233 SBSQ HOSP IP/OBS HIGH 50: CPT | Performed by: INTERNAL MEDICINE

## 2022-07-31 PROCEDURE — 2580000003 HC RX 258: Performed by: INTERNAL MEDICINE

## 2022-07-31 PROCEDURE — 36415 COLL VENOUS BLD VENIPUNCTURE: CPT

## 2022-07-31 PROCEDURE — 1200000000 HC SEMI PRIVATE

## 2022-07-31 PROCEDURE — 94640 AIRWAY INHALATION TREATMENT: CPT

## 2022-07-31 RX ORDER — LEVOFLOXACIN 250 MG/1
250 TABLET ORAL DAILY
Status: DISCONTINUED | OUTPATIENT
Start: 2022-07-31 | End: 2022-08-01 | Stop reason: HOSPADM

## 2022-07-31 RX ORDER — LISINOPRIL 10 MG/1
10 TABLET ORAL DAILY
Status: DISCONTINUED | OUTPATIENT
Start: 2022-07-31 | End: 2022-08-01 | Stop reason: HOSPADM

## 2022-07-31 RX ADMIN — MOMETASONE FUROATE AND FORMOTEROL FUMARATE DIHYDRATE 2 PUFF: 200; 5 AEROSOL RESPIRATORY (INHALATION) at 20:59

## 2022-07-31 RX ADMIN — DOXYCYCLINE HYCLATE 100 MG: 100 TABLET, COATED ORAL at 09:00

## 2022-07-31 RX ADMIN — SODIUM CHLORIDE, PRESERVATIVE FREE 10 ML: 5 INJECTION INTRAVENOUS at 09:00

## 2022-07-31 RX ADMIN — AMLODIPINE BESYLATE 10 MG: 10 TABLET ORAL at 20:11

## 2022-07-31 RX ADMIN — METOPROLOL SUCCINATE 25 MG: 25 TABLET, EXTENDED RELEASE ORAL at 09:00

## 2022-07-31 RX ADMIN — PREDNISONE 40 MG: 20 TABLET ORAL at 09:00

## 2022-07-31 RX ADMIN — ACETAMINOPHEN 650 MG: 325 TABLET ORAL at 21:32

## 2022-07-31 RX ADMIN — LISINOPRIL 10 MG: 10 TABLET ORAL at 13:56

## 2022-07-31 RX ADMIN — RIVAROXABAN 15 MG: 15 TABLET, FILM COATED ORAL at 08:59

## 2022-07-31 RX ADMIN — DOXYCYCLINE HYCLATE 100 MG: 100 TABLET, COATED ORAL at 20:12

## 2022-07-31 RX ADMIN — DOFETILIDE 250 MCG: 0.25 CAPSULE ORAL at 09:00

## 2022-07-31 RX ADMIN — MOMETASONE FUROATE AND FORMOTEROL FUMARATE DIHYDRATE 2 PUFF: 200; 5 AEROSOL RESPIRATORY (INHALATION) at 08:54

## 2022-07-31 RX ADMIN — Medication 6 MG: at 21:31

## 2022-07-31 RX ADMIN — DOFETILIDE 250 MCG: 0.25 CAPSULE ORAL at 20:12

## 2022-07-31 RX ADMIN — ROSUVASTATIN CALCIUM 20 MG: 20 TABLET, FILM COATED ORAL at 09:00

## 2022-07-31 RX ADMIN — ISOSORBIDE MONONITRATE 60 MG: 60 TABLET, EXTENDED RELEASE ORAL at 09:00

## 2022-07-31 RX ADMIN — LEVOFLOXACIN 250 MG: 250 TABLET, FILM COATED ORAL at 13:56

## 2022-07-31 RX ADMIN — CLONIDINE HYDROCHLORIDE 0.1 MG: 0.1 TABLET ORAL at 17:19

## 2022-07-31 NOTE — PROGRESS NOTES
[address] [place] [address] Nephrology   [facility]. [facility]  [phone]  Nephrology progress Note          Patient ID: Lissette Riddle  Referring/ Physician: Ric Mishra MD      HPI/Summary:   Lissette Riddle is being seen by nephrology for MARK and hyponatremia. This is a 80-year-old lady with past medical history significant for atrial fibrillation, CAD, CHF, hyperlipidemia, COPD and history of blood clots. She was admitted for shortness of breath. She has been having several days of shortness of breath with cough, feeling weak and fatigued. Said she was having fevers and chills. She has no chest pain no nausea no vomiting no diarrhea. No edema. Her breathing feels better. No edema on exam. Not using NSAIDs. Urine is dark but no blood or foamy frothy urine. , no dysuria.      Diuretics on hold  No significant pedal edema  On oxygen  Creatinine coming down  Electrolytes are stable  Resume lisinopril at 10 mg a day today  If renal function is stable tomorrow can go up to her usual dose of 20 mg    Plan:   Currently on amlodipine isosorbide clonidine and metoprolol  Hold the IV fluids that will likely help blood pressure also    Acute kidney injury  Baseline creatinine appears to be 1.3-1.5  Her creatinine at time of consult is 1.7  Urinalysis shows trace blood 100 mg/dL protein, no hyaline casts 1 WBC 1 RBC  Urine output has been good  She does not have a Linder, confirm bladder scan no retention  She has not had any hypotensive episodes here, was very hypertensive on admission at 197/82  Renal ultrasound has been ordered but not done yet  She has received Vanco and cefepime, also received Lasix and lisinopril during this admission    Shortness of breath  Her chest x-ray shows a new left suprahilar groundglass opacity possibly consolidation, pneumonia  BNP is elevated at 4288  Last echo showed an EF about 06%, diastolic dysfunction grade 2 diastolic dysfunction left atrium was severely dilated  She received 3 doses of IV Lasix, held today for creatinine rising    Hypertension  She is on amlodipine 10 mg daily  Hydralazine was stopped she was on 10 mg 3 times daily  She is on lisinopril 20 mg daily, this is held with MARK  Imdur 60 mg daily and metoprolol 25 mg daily  She is on THE [facility] Nephrology would like to thank you for the opportunity to serve this patient. Please call with any questions or concerns. Noe Dang MD  [facility] Nephrology  [address]  [place], [address]  Fax: [phone]  Office: [phone]         CC/Reason for consult:   Reason for consult: MARK  Chief Complaint   Patient presents with    Shortness of Breath           Review of Systems:   [address]. All other remaining systems are negative. Constitutional:  fever, chills, weakness, weight change, fatigue,      Skin:  rash, pruritus, hair loss, bruising, dry skin, petechiae. Head, Face, Neck   headaches, swelling,  cervical adenopathy. Respiratory: shortness of breath, cough, or wheezing  Cardiovascular: chest pain, palpitations, dizzy, edema  Gastrointestinal: nausea, vomiting, diarrhea, constipation,belly pain    Yellow skin, blood in stool  Musculoskeletal:  back pain, muscle weakness, gait problems,       joint pain or swelling. Genitourinary:  dysuria, poor urine flow, flank pain, blood in urine  Neurologic:  vertigo, TIA'S, syncope, seizures, focal weakness  Psychosocial:  insomnia, anxiety, or depression.   Additional positive findings: -     PMH/SH/FH:    Medical Hx: reviewed and updated as appropriate  Past Medical History:   Diagnosis Date    AAA (abdominal aortic aneurysm) ([address]     pt states it is 4cm    AAA (abdominal aortic aneurysm) without rupture ([address] 2/10/2015    Atrial fibrillation (HCC)     CAD (coronary artery disease)     CHF (congestive heart failure) (HCC)     COPD (chronic obstructive pulmonary disease) (HCC)     History of blood clots     Hyperlipidemia     Hypertension Surgical Hx: reviewed and updated as appropriate   has a past surgical history that includes Colonoscopy (07); Hysterectomy (); Appendectomy (); bladder suspension; Cataract removal; Tonsillectomy (as a child); tumor excision; Cholecystectomy (10/15/13); Colonoscopy (2017); joint replacement (2013); Abdominal aortic aneurysm repair; and Cardiac surgery. Social Hx: reviewed and updated as appropriate  Social History     Tobacco Use    Smoking status: Former     Packs/day: 1.00     Years: 37.00     Pack years: 37.00     Types: Cigarettes     Start date: 1976     Quit date: 2013     Years since quittin.8    Smokeless tobacco: Never    Tobacco comments:     E cigarette now and then   Substance Use Topics    Alcohol use: No        Family hx: reviewed and updated as appropriate  family history includes Heart Disease in her father; Hypertension in an other family member. Medications:   levoFLOXacin, 250 mg, Daily  amLODIPine, 10 mg, Nightly  isosorbide mononitrate, 60 mg, Daily  dofetilide, 250 mcg, Q12H  metoprolol succinate, 25 mg, Daily  rivaroxaban, 15 mg, Daily with breakfast  rosuvastatin, 20 mg, Daily  mometasone-formoterol, 2 puff, BID  sodium chloride flush, 5-40 mL, 2 times per day  doxycycline hyclate, 100 mg, 2 times per day     Morphine    Allergies: Allergies   Allergen Reactions    Morphine Rash     rash         Physical Exam/Objective:   Vitals:    22 1128   BP: (!) 163/72   Pulse: 70   Resp: 17   Temp: 98.6 °F (37 °C)   SpO2: 93%       Intake/Output Summary (Last 24 hours) at 2022 1345  Last data filed at 2022 1233  Gross per 24 hour   Intake 605 ml   Output 2300 ml   Net -1695 ml           General appearance:  in no acute distress, comfortable, communicative, awake and alert. HEENT: no icterus, EOM intact, trachea midline. Neck : no masses, appears symmetrical and no JVD appreciated.    Respiratory: Respiratory effort normal, bilateral equal chest rise. No wheeze, no crackles   Cardiovascular: Ausculation shows RRR and  no edema   Abdomen: abdomen is soft, non distended, no masses, no pain with palpation. Musculoskeletal:  no joint swelling, no deformity, strength grossly normal.   Skin: no rashes, no induration, no tightening, no jaundice   Neuro:   Follows commands, moves all extremities spontaneously   No edema    Data:   CBC:   Recent Labs     07/29/22  0616 07/30/22  0841 07/31/22  0725   WBC 14.1* 11.5* 13.9*   HGB 10.3* 10.9* 10.8*   HCT 31.0* 33.3* 33.5*    217 251       BMP:    Recent Labs     07/29/22  0616 07/30/22  0841 07/31/22  0725   * 141 140   K 4.4 3.7 4.4   CL 97* 101 100   CO2 30 29 31   BUN 47* 49* 45*   CREATININE 1.7* 1.3* 1.2   GLUCOSE 101* 96 87

## 2022-07-31 NOTE — PROGRESS NOTES
Hospitalist Progress Note      PCP: Soni Sage MD    Date of Admission: 7/26/2022    Chief Complaint:   Chief Complaint   Patient presents with    Shortness of [address]: 64-year-old female past medical history significant for atrial fibrillation, CHF, CAD daily, dyslipidemia presents to the emergency department complaints of shortness of breath and cough. Patient reports shortness of breath and cough for the last 3 days associated with weakness. Subjective fever and chill. Admitted with COPD exacerbation/pneumonia as well as MARK    Subjective:   Continues to report improvement in shortness of breath,      Medications:  Reviewed    Infusion Medications    sodium chloride       Scheduled Medications    levoFLOXacin  250 mg Oral Daily    lisinopril  10 mg Oral Daily    amLODIPine  10 mg Oral Nightly    isosorbide mononitrate  60 mg Oral Daily    dofetilide  250 mcg Oral Q12H    metoprolol succinate  25 mg Oral Daily    rivaroxaban  15 mg Oral Daily with breakfast    rosuvastatin  20 mg Oral Daily    mometasone-formoterol  2 puff Inhalation BID    sodium chloride flush  5-40 mL IntraVENous 2 times per day    doxycycline hyclate  100 mg Oral 2 times per day     PRN Meds: albuterol sulfate HFA, cloNIDine, melatonin, sodium chloride flush, sodium chloride, polyethylene glycol, acetaminophen **OR** acetaminophen      Intake/Output Summary (Last 24 hours) at 7/31/2022 1426  Last data filed at 7/31/2022 1233  Gross per 24 hour   Intake 605 ml   Output 2300 ml   Net -1695 ml         Physical Exam Performed:    BP (!) 163/72   Pulse 70   Temp 98.6 °F (37 °C) (Oral)   Resp 17   Ht 5' 4\" (1.626 m)   Wt 228 lb 2.8 oz (103.5 kg)   SpO2 93%   BMI 39.17 kg/m²     General appearance: No apparent distress, appears stated age and cooperative. HEENT: Pupils equal, round, and reactive to light. Conjunctivae/corneas clear. Neck: Supple, with full range of motion. No jugular venous distention. Trachea midline. Respiratory:  Normal respiratory effort. Clear to auscultation, bilaterally without Rales/Wheezes/Rhonchi. Cardiovascular: Regular rate and rhythm with normal S1/S2 without murmurs, rubs or gallops. Abdomen: Soft, non-tender, non-distended with normal bowel sounds. Musculoskeletal: No clubbing, cyanosis or edema bilaterally. Full range of motion without deformity. Skin: Skin color, texture, turgor normal.  No rashes or lesions. Neurologic:  Neurovascularly intact without any focal sensory/motor deficits. Cranial nerves: II-XII intact, grossly non-focal.  Psychiatric: Alert and oriented, thought content appropriate, normal insight  Capillary Refill: Brisk,3 seconds, normal   Peripheral Pulses: +2 palpable, equal bilaterally       Labs:   Recent Labs     07/29/22  0616 07/30/22  0841 07/31/22  0725   WBC 14.1* 11.5* 13.9*   HGB 10.3* 10.9* 10.8*   HCT 31.0* 33.3* 33.5*    217 251       Recent Labs     07/29/22  0616 07/30/22  0841 07/31/22  0725   * 141 140   K 4.4 3.7 4.4   CL 97* 101 100   CO2 30 29 31   BUN 47* 49* 45*   CREATININE 1.7* 1.3* 1.2   CALCIUM 8.9 9.3 9.3       No results for input(s): AST, ALT, BILIDIR, BILITOT, ALKPHOS in the last 72 hours. No results for input(s): INR in the last 72 hours. No results for input(s): Teodoro Shackle in the last 72 hours. Urinalysis:      Lab Results   Component Value Date/Time    NITRU Negative 07/29/2022 02:43 PM    WBCUA 1 09/09/2021 11:52 AM    BACTERIA 1+ 09/24/2020 06:30 PM    RBCUA 1 09/09/2021 11:52 AM    BLOODU Negative 07/29/2022 02:43 PM    SPECGRAV 1.014 07/29/2022 02:43 PM    GLUCOSEU Negative 07/29/2022 02:43 PM       Radiology:  XR CHEST PORTABLE   Final Result   Findings most consistent with mild CHF with mild perihilar edema and small   bilateral effusions. US RENAL COMPLETE   Final Result   No sonographic abnormality.          XR CHEST PORTABLE   Final Result   New left suprahilar ground-glass opacity may represent developing   consolidation in the appropriate clinical setting. Assessment/Plan:    Active Hospital Problems    Diagnosis     Pneumonia [J18.9]      Priority: Medium    Acute respiratory failure with hypoxia (HCC) [J96.01]        Acute kidney injury   Likely a combination of nephrotoxicity from medication including lisinopril and Lasix  Hold both  Creatinine 1.2 ( down from 1.7)   Check urinalysis  Check renal ultrasound  Consult nephrology  DC IV fluid  ACE inhibitor resumed by nephrology    Acute on chronic hypoxic respiratory failure  Sepsis due to Pneumonia    COPD exacerbation  Patient only uses 2 L oxygen at nighttime. Chest x-ray with left lower lobe infiltrate  Initially started on cefepime and doxycycline, later de-escalated to Levaquin as IV access was lost  Schedule bronchodilators  Oral prednisone 40 mg p.o. for 5 days  Perform home O2 evaluation  Consult pulmonologist for recommendations     Acute on chronic diastolic heart failure  last ECHO: 9/2021 EF 75% Grade 2 Diastolic Dysfunction  Appears to be fairly compensated  Hold Lasix due to MARK, hold lisinopril due to MARK     Atrial Fibrillation   - currently rate controlled  - continue Xarelto, Dofetilide and metoprolol   EP following     Essential (primary) hypertension  - monitor blood pressure  - continue home meds     Hyperlipidemia   - continue statin     DVT Prophylaxis: Xarelto  Diet: ADULT DIET; Regular; 4 carb choices (60 gm/meal);  Low Sodium (2 gm); 1500 ml  Code Status: Full Code     PT/OT Eval Status: Requested     Dispo -possible discharge tomorrow  Carlos Gibson MD

## 2022-07-31 NOTE — PROGRESS NOTES
Pulmonary Progress Note    Date of Admission: 7/26/2022   LOS: 4 days       CC:  Chief Complaint   Patient presents with    Shortness of Breath        Subjective:  Feeling better. Would like to be discharged. ROS:   No nausea  No Vomiting  No chest pain      Assessment:         Plan: This note may have been transcribed using [id number] "[employer]. Please disregard any translational errors. Hospital Day: 4     Healthcare associated pneumonia  Leukocytosis improving with cefepime  . Plan for 7 days total,  change to levaquin with loss of IV    COPD  Dulera with albuterol as needed      Chronic hypoxemia  Baseline 2 L nasal cannula      Acute on chronic diastolic CHF, grade 2 diastolic function  Volume per nephrology      Acute kidney injury  Per nephrology          Data:        PHYSICAL EXAM:   Blood pressure (!) 163/72, pulse 70, temperature 98.6 °F (37 °C), temperature source Oral, resp. rate 17, height 5' 4\" (1.626 m), weight 228 lb 2.8 oz (103.5 kg), SpO2 93 %, not currently breastfeeding.'  Body mass index is 39.17 kg/m². Gen: No distress. ENT:   Resp: No accessory muscle use. No crackles. No wheezes. No rhonchi. CV: Regular rate. Regular rhythm. No murmur or rub. No edema. Skin: Warm, dry, normal texture and turgor. No nodule on exposed extremities. M/S: No cyanosis. No clubbing. No joint deformity. Psych: Oriented x 3. No anxiety. Awake. Alert. Intact judgement and insight. Good Mood / Affect.   Memory appears in tact       Medications:    Scheduled Meds:   amLODIPine  10 mg Oral Nightly    isosorbide mononitrate  60 mg Oral Daily    cefepime  2,000 mg IntraVENous BID    dofetilide  250 mcg Oral Q12H    metoprolol succinate  25 mg Oral Daily    rivaroxaban  15 mg Oral Daily with breakfast    rosuvastatin  20 mg Oral Daily    mometasone-formoterol  2 puff Inhalation BID    sodium chloride flush  5-40 mL IntraVENous 2 times per day    doxycycline hyclate  100 mg Oral 2 times per day       Continuous Infusions:   sodium chloride         PRN Meds:  albuterol sulfate HFA, cloNIDine, melatonin, sodium chloride flush, sodium chloride, polyethylene glycol, acetaminophen **OR** acetaminophen    Labs reviewed:  CBC:   Recent Labs     07/29/22  0616 07/30/22  0841 07/31/22  0725   WBC 14.1* 11.5* 13.9*   HGB 10.3* 10.9* 10.8*   HCT 31.0* 33.3* 33.5*   MCV 87.7 88.7 88.7    217 251       BMP:   Recent Labs     07/29/22  0616 07/30/22  0841 07/31/22  0725   * 141 140   K 4.4 3.7 4.4   CL 97* 101 100   CO2 30 29 31   BUN 47* 49* 45*   CREATININE 1.7* 1.3* 1.2       LIVER PROFILE: No results for input(s): AST, ALT, LIPASE, BILIDIR, BILITOT, ALKPHOS in the last 72 hours. Invalid input(s): AMYLASE,  ALB  PT/INR: No results for input(s): PROTIME, INR in the last 72 hours. APTT: No results for input(s): APTT in the last 72 hours. UA:  Recent Labs     07/29/22  1443   COLORU Yellow   PHUR 5.0   CLARITYU Clear   SPECGRAV 1.014   LEUKOCYTESUR Negative   UROBILINOGEN 0.2   BILIRUBINUR Negative   BLOODU Negative   GLUCOSEU Negative       No results for input(s): PH, PCO2, PO2 in the last 72 hours. Cx:      Films: This note was transcribed using [id number] [facility]. Please disregard any translational errors.       [facility] [phone]

## 2022-07-31 NOTE — PLAN OF CARE
Problem: Discharge Planning  Goal: Discharge to home or other facility with appropriate resources  7/31/2022 0926 by Nanci Dozier RN  Outcome: Progressing  7/31/2022 0022 by Jordi Gillette RN  Outcome: Progressing     Problem: Safety - Adult  Goal: Free from fall injury  7/31/2022 0926 by Nanci Dozier RN  Outcome: Progressing  7/31/2022 0022 by Jordi Gillette RN  Outcome: Progressing  Flowsheets (Taken 7/31/2022 0021)  Free From Fall Injury: Instruct family/caregiver on patient safety     Problem: ABCDS Injury Assessment  Goal: Absence of physical injury  7/31/2022 0926 by Nanci Dozier RN  Outcome: Progressing  7/31/2022 0022 by Jordi Gillette RN  Outcome: Progressing  Flowsheets (Taken 7/31/2022 0021)  Absence of Physical Injury: Implement safety measures based on patient assessment     Problem: Respiratory - Adult  Goal: Achieves optimal ventilation and oxygenation  7/31/2022 0926 by Nanci Dozier RN  Outcome: Progressing  7/31/2022 0022 by Jordi Gillette RN  Outcome: Progressing     Problem: Chronic Conditions and Co-morbidities  Goal: Patient's chronic conditions and co-morbidity symptoms are monitored and maintained or improved  7/31/2022 0926 by Nanci Dozier RN  Outcome: Progressing  7/31/2022 0022 by Jordi Gillette RN  Outcome: Progressing     Problem: Skin/Tissue Integrity  Goal: Absence of new skin breakdown  Description: 1. Monitor for areas of redness and/or skin breakdown  2. Assess vascular access sites hourly  3. Every 4-6 hours minimum:  Change oxygen saturation probe site  4. Every 4-6 hours:  If on nasal continuous positive airway pressure, respiratory therapy assess nares and determine need for appliance change or resting period.   7/31/2022 0926 by Nanci Dozier RN  Outcome: Progressing  7/31/2022 0022 by Jordi Gillette RN  Outcome: Progressing

## 2022-07-31 NOTE — PLAN OF CARE
Problem: Discharge Planning  Goal: Discharge to home or other facility with appropriate resources  Outcome: Progressing     Problem: Safety - Adult  Goal: Free from fall injury  Outcome: Progressing  Flowsheets (Taken 7/31/2022 0021)  Free From Fall Injury: Instruct family/caregiver on patient safety     Problem: ABCDS Injury Assessment  Goal: Absence of physical injury  Outcome: Progressing  Flowsheets (Taken 7/31/2022 0021)  Absence of Physical Injury: Implement safety measures based on patient assessment     Problem: Respiratory - Adult  Goal: Achieves optimal ventilation and oxygenation  Outcome: Progressing     Problem: Chronic Conditions and Co-morbidities  Goal: Patient's chronic conditions and co-morbidity symptoms are monitored and maintained or improved  Outcome: Progressing     Problem: Skin/Tissue Integrity  Goal: Absence of new skin breakdown  Description: 1. Monitor for areas of redness and/or skin breakdown  2. Assess vascular access sites hourly  3. Every 4-6 hours minimum:  Change oxygen saturation probe site  4. Every 4-6 hours:  If on nasal continuous positive airway pressure, respiratory therapy assess nares and determine need for appliance change or resting period.   Outcome: Progressing

## 2022-08-01 VITALS
HEIGHT: 64 IN | DIASTOLIC BLOOD PRESSURE: 72 MMHG | OXYGEN SATURATION: 91 % | RESPIRATION RATE: 28 BRPM | WEIGHT: 231.04 LBS | TEMPERATURE: 98 F | BODY MASS INDEX: 39.44 KG/M2 | HEART RATE: 57 BPM | SYSTOLIC BLOOD PRESSURE: 166 MMHG

## 2022-08-01 LAB
ANION GAP SERPL CALCULATED.3IONS-SCNC: 7 MMOL/L (ref 3–16)
BASOPHILS ABSOLUTE: 0 K/UL (ref 0–0.2)
BASOPHILS RELATIVE PERCENT: 0.2 %
BUN BLDV-MCNC: 35 MG/DL (ref 7–20)
CALCIUM SERPL-MCNC: 9.2 MG/DL (ref 8.3–10.6)
CHLORIDE BLD-SCNC: 102 MMOL/L (ref 99–110)
CO2: 31 MMOL/L (ref 21–32)
CREAT SERPL-MCNC: 1.1 MG/DL (ref 0.6–1.2)
EOSINOPHILS ABSOLUTE: 0.3 K/UL (ref 0–0.6)
EOSINOPHILS RELATIVE PERCENT: 2.3 %
GFR AFRICAN AMERICAN: 58
GFR NON-AFRICAN AMERICAN: 48
GLUCOSE BLD-MCNC: 103 MG/DL (ref 70–99)
HCT VFR BLD CALC: 30.9 % (ref 36–48)
HEMOGLOBIN: 10 G/DL (ref 12–16)
LYMPHOCYTES ABSOLUTE: 2 K/UL (ref 1–5.1)
LYMPHOCYTES RELATIVE PERCENT: 14 %
MCH RBC QN AUTO: 28.8 PG (ref 26–34)
MCHC RBC AUTO-ENTMCNC: 32.3 G/DL (ref 31–36)
MCV RBC AUTO: 89.2 FL (ref 80–100)
MONOCYTES ABSOLUTE: 1.3 K/UL (ref 0–1.3)
MONOCYTES RELATIVE PERCENT: 9.2 %
NEUTROPHILS ABSOLUTE: 10.4 K/UL (ref 1.7–7.7)
NEUTROPHILS RELATIVE PERCENT: 74.3 %
PDW BLD-RTO: 15.6 % (ref 12.4–15.4)
PLATELET # BLD: 248 K/UL (ref 135–450)
PMV BLD AUTO: 8.6 FL (ref 5–10.5)
POTASSIUM SERPL-SCNC: 4 MMOL/L (ref 3.5–5.1)
RBC # BLD: 3.47 M/UL (ref 4–5.2)
SODIUM BLD-SCNC: 140 MMOL/L (ref 136–145)
WBC # BLD: 14 K/UL (ref 4–11)

## 2022-08-01 PROCEDURE — 94640 AIRWAY INHALATION TREATMENT: CPT

## 2022-08-01 PROCEDURE — 85025 COMPLETE CBC W/AUTO DIFF WBC: CPT

## 2022-08-01 PROCEDURE — 6370000000 HC RX 637 (ALT 250 FOR IP): Performed by: INTERNAL MEDICINE

## 2022-08-01 PROCEDURE — 80048 BASIC METABOLIC PNL TOTAL CA: CPT

## 2022-08-01 PROCEDURE — 94760 N-INVAS EAR/PLS OXIMETRY 1: CPT

## 2022-08-01 PROCEDURE — 94680 O2 UPTK RST&XERS DIR SIMPLE: CPT

## 2022-08-01 PROCEDURE — 97530 THERAPEUTIC ACTIVITIES: CPT

## 2022-08-01 PROCEDURE — 36415 COLL VENOUS BLD VENIPUNCTURE: CPT

## 2022-08-01 PROCEDURE — 6370000000 HC RX 637 (ALT 250 FOR IP): Performed by: STUDENT IN AN ORGANIZED HEALTH CARE EDUCATION/TRAINING PROGRAM

## 2022-08-01 PROCEDURE — 94669 MECHANICAL CHEST WALL OSCILL: CPT

## 2022-08-01 PROCEDURE — 2700000000 HC OXYGEN THERAPY PER DAY

## 2022-08-01 RX ORDER — LEVOFLOXACIN 250 MG/1
250 TABLET ORAL DAILY
Qty: 2 TABLET | Refills: 0 | Status: SHIPPED | OUTPATIENT
Start: 2022-08-02 | End: 2022-08-04

## 2022-08-01 RX ADMIN — METOPROLOL SUCCINATE 25 MG: 25 TABLET, EXTENDED RELEASE ORAL at 10:01

## 2022-08-01 RX ADMIN — DOFETILIDE 250 MCG: 0.25 CAPSULE ORAL at 10:02

## 2022-08-01 RX ADMIN — RIVAROXABAN 15 MG: 15 TABLET, FILM COATED ORAL at 10:01

## 2022-08-01 RX ADMIN — LISINOPRIL 10 MG: 10 TABLET ORAL at 10:01

## 2022-08-01 RX ADMIN — DOXYCYCLINE HYCLATE 100 MG: 100 TABLET, COATED ORAL at 10:01

## 2022-08-01 RX ADMIN — ISOSORBIDE MONONITRATE 60 MG: 60 TABLET, EXTENDED RELEASE ORAL at 10:01

## 2022-08-01 RX ADMIN — LEVOFLOXACIN 250 MG: 250 TABLET, FILM COATED ORAL at 10:02

## 2022-08-01 RX ADMIN — ROSUVASTATIN CALCIUM 20 MG: 20 TABLET, FILM COATED ORAL at 10:01

## 2022-08-01 RX ADMIN — MOMETASONE FUROATE AND FORMOTEROL FUMARATE DIHYDRATE 2 PUFF: 200; 5 AEROSOL RESPIRATORY (INHALATION) at 09:00

## 2022-08-01 ASSESSMENT — PAIN SCALES - GENERAL: PAINLEVEL_OUTOF10: 0

## 2022-08-01 NOTE — CARE COORDINATION
CASE MANAGEMENT DISCHARGE SUMMARY:    DISCHARGE DATE: 08/01/22    DISCHARGED TO: home     HOME CARE AGENCY: Discharging to Facility/ Agency   Name:  [facility] care    Address: [address] [place] [address], [address] [place] [address]  Phone: [phone]  Fax: [phone]     TRANSPORTATION: spouse             TIME: at bedside  Denies needs.     Bethel Rodriguez RN, BSN, Case Management  [phone]    Electronically signed by Bethel Rodriguez RN on 8/1/2022 at 1:55 PM

## 2022-08-01 NOTE — PROGRESS NOTES
[facility]    Respiratory Therapy   Home Oxygen Evaluation        Name: Bhaskar Og Record Number: [id number]  Age: 69 y.o. Gender:  female   : 1945  Today's date: 2022  Room: H3A-1540/4265-01      Assessment        BP (!) 166/72   Pulse 57   Temp 98 °F (36.7 °C) (Oral)   Resp 16   Ht 5' 4\" (1.626 m)   Wt 231 lb 0.7 oz (104.8 kg)   SpO2 (!) 88%   BMI 39.66 kg/m²     Patient Active Problem List   Diagnosis    Primary hypertension    Hyperlipidemia    Coronary artery disease    DENISE (obstructive sleep apnea)    History of DVT (deep vein thrombosis)    Family history of DVT    AAA (abdominal aortic aneurysm) without rupture ([facility])    Pleural effusion, left    Pleural effusion    COPD exacerbation ([facility])    Pelvic pain in female    Vitamin D deficiency    Other emphysema ([facility])    Acute bronchitis    Acute respiratory failure with hypoxia ([facility])    Abnormal chest x-ray    Atypical pneumonia    Atrial fibrillation with RVR ([facility])    Chronic diastolic heart failure ([facility])    PVD (peripheral vascular disease) ([facility])    Abnormal nuclear stress test    AAA (abdominal aortic aneurysm) ([facility])    PAF (paroxysmal atrial fibrillation) ([facility])    Endoleak post (EVAR) endovascular aneurysm repair, sequela    Carotid artery stenosis without cerebral infarction, bilateral    History of repair of aneurysm of abdominal aorta using endovascular stent graft    Atherosclerotic heart disease of native coronary artery with other forms of angina pectoris ([address]    Morbidly obese ([facility])    COPD, severe ([address]    Hypertensive crisis    Acute on chronic diastolic congestive heart failure (HCC)    Respiratory failure ([facility])    Left atrial flutter by electrocardiogram ([facility])    Persistent atrial fibrillation ([facility])    A-fib ([facility])    Obesity (BMI 30-39. 9)    Atrial fibrillation ([facility])    SOB (shortness of breath)    Chronic systolic (congestive) heart failure    Pneumonia       Social History:  Social History Tobacco Use    Smoking status: Former     Packs/day: 1.00     Years: 37.00     Pack years: 37.00     Types: Cigarettes     Start date: 1976     Quit date: 2013     Years since quittin.8    Smokeless tobacco: Never    Tobacco comments:     E cigarette now and then   Vaping Use    Vaping Use: Some days    Substances: Nicotine   Substance Use Topics    Alcohol use: No    Drug use: No       Patient Room Air saturation at rest 86  %    Oxygen saturations of 88% or less on RA qualifies patient for Home Oxygen    Patient resting on 2  lmp  with an oxygen saturation of  94 %     Patient ambulated on 1 lpm with an oxygen saturation of 82%    Patient ambulated on 2 lpm with an oxygen saturation of 91%    Qualifying patient for home oxygen with ambulation and continuous flow  @ 2 lpm.      In your clinical assessment does the Patient Require Portable Oxygen Tanks?     Yes        [facility] contacted to follow care of patients home oxygen needs on 2022 at 12:28 PM    Patient/caregiver was educated on Home Oxygen process:  Yes      Level of patient/caregiver understanding able to:   [x] Verbalize understanding   [] Demonstrate understanding       [] Teach back        [] Needs reinforcement        []  No available caregiver               []  Other:     Response to education:  Very Good       Time Spent with Home O2 Set Up:  20  minutes     Nicolette Britton RCP on 2022 at 12:28 PM

## 2022-08-01 NOTE — PROGRESS NOTES
[facility]                                               Progress note    Summary:   Marely Hackett is being seen by nephrology for MARK. Admitted with SOB. Interval History  Seen and examined at bedside. She is feeling much better, feels her breathing is close to baseline  She uses 2 L at night. No chest pain no shortness of breath no edema. Blood pressure 166/72, lisinopril was added back  80% on 3 L    CR 1.2> 1.1    Plan:   -Patient is returned to her baseline renal function, good urine output no acute electrolyte abnormalities  Blood pressure is elevated, lisinopril was resumed  Okay with discharge from renal aspect      Kristopher Keita MD  [facility]  Office: [phone]    Assessment:   Acute kidney injury  Baseline creatinine appears to be 1.3-1.5  Her creatinine at time of consult is 1.7  Urinalysis shows trace blood 100 mg/dL protein, no hyaline casts 1 WBC 1 RBC  Urine output has been good  She does not have a Linder, confirm bladder scan no retention  She has not had any hypotensive episodes here, was very hypertensive on admission at 197/82  Renal ultrasound has been ordered but not done yet  She has received Vanco and cefepime, also received Lasix and lisinopril during this admission    Shortness of breath  Her chest x-ray shows a new left suprahilar groundglass opacity possibly consolidation, pneumonia  BNP is elevated at 4288  Last echo showed an EF about 49%, diastolic dysfunction grade 2 diastolic dysfunction left atrium was severely dilated  She received 3 doses of IV Lasix, held today for creatinine rising    Hypertension  She is on amlodipine 10 mg daily  Hydralazine was stopped she was on 10 mg 3 times daily  She is on lisinopril 20 mg daily, this is held with MARK  Imdur 60 mg daily and metoprolol 25 mg daily  She is on Crestor            ROS:     Positives Listed Bold. All other remaining systems are negative.     Constitutional:  fever, chills, weakness, weight change, fatigue,      Skin:  rash, pruritus, hair loss, bruising, dry skin, petechiae. Head, Face, Neck   headaches, swelling,  cervical adenopathy. Respiratory: shortness of breath, cough, or wheezing  Cardiovascular: chest pain, palpitations, dizzy, edema  Gastrointestinal: nausea, vomiting, diarrhea, constipation,belly pain    Yellow skin, blood in stool  Musculoskeletal:  back pain, muscle weakness, gait problems,       joint pain or swelling. Genitourinary:  dysuria, poor urine flow, flank pain, blood in urine  Neurologic:  vertigo, TIA'S, syncope, seizures, focal weakness  Psychosocial:  insomnia, anxiety, or depression. Additional positive findings: -     PMH:   Past medical history, surgical history, social history, family history are reviewed and updated as appropriate. Reviewed current medication list.   Allergies reviewed and updated as needed. PE:   Vitals:    08/01/22 0903   BP:    Pulse: 52   Resp: 16   Temp:    SpO2: 98%       General appearance:  in NAD, fully alert and oriented. Comfortable. HEENT: EOM intact, no icterus. Trachea is midline. Neck : No masses, appears symmetrical, no JVD  Respiratory: Respiratory effort appears normal, bilateral equal chest rise, no wheeze, no crackles   Cardiovascular: Ausculation shows RRR no edema  Abdomen: No visible mass or tenderness, non distended. Musculoskeletal:  Joints with no swelling or deformity. Skin:no rashes, ulcers, induration, no jaundice. Neuro: face symmetric, no focal deficits. Appropriate responses.        Lab Results   Component Value Date    CREATININE 1.2 07/31/2022    BUN 45 (H) 07/31/2022     07/31/2022    K 4.4 07/31/2022     07/31/2022    CO2 31 07/31/2022      Lab Results   Component Value Date    WBC 14.0 (H) 08/01/2022    HGB 10.0 (L) 08/01/2022    HCT 30.9 (L) 08/01/2022    MCV 89.2 08/01/2022     08/01/2022     Lab Results   Component Value Date    CALCIUM 9.3 07/31/2022    PHOS 5.2 (H) 07/07/2021

## 2022-08-01 NOTE — PROGRESS NOTES
Tele removed. Pt dressed and all belongings gathered. All paperwork reviewed with pt and . Both voiced understanding and all questions answered. Transport placed .

## 2022-08-01 NOTE — PROGRESS NOTES
Physical Therapy  Facility/Department: [facility]N MED SURG  Physical Therapy Treatment Note    Name: Renny Yo  : 1945  MRN: [id number]  Date of Service: 2022    Discharge Recommendations:  Home with assist PRN, Continue to assess pending progress   PT Equipment Recommendations  Equipment Needed: No  Other: Has a 4 WW at home that will be useful for long distances      Patient Diagnosis(es): There were no encounter diagnoses. Past Medical History:  has a past medical history of AAA (abdominal aortic aneurysm) ([facility] AAA (abdominal aortic aneurysm) without rupture (HCC), Atrial fibrillation ([facility] CAD (coronary artery disease), CHF (congestive heart failure) ([facility] COPD (chronic obstructive pulmonary disease) ([facility] History of blood clots, Hyperlipidemia, and Hypertension. Past Surgical History:  has a past surgical history that includes Colonoscopy (07); Hysterectomy (); Appendectomy (); bladder suspension; Cataract removal; Tonsillectomy (as a child); tumor excision; Cholecystectomy (10/15/13); Colonoscopy (2017); joint replacement (2013); Abdominal aortic aneurysm repair; and Cardiac surgery. Assessment   Body Structures, Functions, Activity Limitations Requiring Skilled Therapeutic Intervention: Decreased functional mobility ; Decreased balance;Decreased endurance  Assessment: 50-year-old female past medical history significant for atrial fibrillation, CHF, CAD daily, dyslipidemia presents to the emergency department complaints of shortness of breath and cough. Pt was recently d/c'd from hospital for same diagnosis. Pt. appears to be below baseline level of function d/t SOB and decreased endurance - requiring 1L O2 with ambulation to maintain sats > 90%. Pt. will benefit from skilled PT to improve functional mobility/endurance. Anticipate Pt. being able to return home with assist from spouse.   Treatment Diagnosis: Impaired functional mobility  Therapy Prognosis: Good  Decision Making: Medium Complexity  History: as above  Exam: as above  Clinical Presentation: evolving  Barriers to Learning: Mild insight impairment  Activity Tolerance  Activity Tolerance: Patient limited by endurance     Plan   Plan  Plan: 3-5 times per week  Current Treatment Recommendations: Gait training, Balance training, Equipment evaluation, education, & procurement, Safety education & training, Patient/Caregiver education & training, Stair training, Endurance training  Safety Devices  Type of Devices: All fall risk precautions in place, Call light within reach, Gait belt, Patient at risk for falls, Left in chair, Nurse notified  Restraints  Restraints Initially in Place: No     Restrictions  Restrictions/Precautions  Restrictions/Precautions: Fall Risk  Required Braces or Orthoses?: No  Position Activity Restriction  Other position/activity restrictions: 3L O2 (baseline O2 at night only) to 1L O2 with activity. Subjective   General  Chart Reviewed: Yes  Patient assessed for rehabilitation services?: Yes  Additional Pertinent Hx: 52-year-old female past medical history significant for atrial fibrillation, CHF, CAD daily, dyslipidemia presents to the emergency department complaints of shortness of breath and cough. Response To Previous Treatment: Patient with no complaints from previous session.   Family / Caregiver Present: No  Referring Practitioner: Dr. Flo James  Referral Date : 07/28/22  Diagnosis: Pneumonia  Follows Commands: Within Functional Limits  Subjective  Subjective: Pt is agreeable to PT         Social/Functional History  Social/Functional History  Lives With: Spouse  Type of Home: Mobile home  Home Layout: One level  Home Access: Stairs to enter with rails  Entrance Stairs - Number of Steps: 1  Bathroom Shower/Tub:  (walk-in tub w/ small step)  Bathroom Toilet: Standard  Bathroom Equipment: Built-in shower seat, Grab bars in shower  Home Equipment: [place], rolling, Walker, 4 wheeled, Oxygen (2L O2 at night)  Has the patient had two or more falls in the past year or any fall with injury in the past year?: No  ADL Assistance: Independent (Pt can do but sometimes  assists w/ socks and shoes)  Homemaking Assistance: Needs assistance  Homemaking Responsibilities: No  Ambulation Assistance: Independent  Transfer Assistance: Independent  Active : Yes  Occupation: Retired  Type of Occupation: [address] Walking, karaoke on Wednesdays    [address]: Within Functional Limits  Hearing  Hearing: Within functional limits      Cognition   Orientation  Overall Orientation Status: Within Functional Limits  Cognition  Overall Cognitive Status: WFL  Cognition Comment: Mild insight concerns     Objective   Observation/Palpation  Posture: Fair  Gross Assessment  Strength: Generally decreased, functional        Bed mobility  Supine to Sit: Minimal assistance; Moderate assistance  Sit to Supine: Unable to assess (in recliner at end of session)  Scooting: Stand by assistance  Transfers  Sit to Stand: Stand by assistance;Contact guard assistance  Stand to sit: Stand by assistance;Contact guard assistance  Ambulation  Surface: level tile  Device: Hand-Held Assist  Assistance: Contact guard assistance;Minimal assistance  Quality of Gait: mild unsteadiness but no overt LOB  Gait Deviations: Increased BRAULIO; Decreased step length;Decreased step height  Distance: 20'  Comments: Pt appears SOB but denies feeling so. O2 sats at 93% on RA at rest, 83% following ambulation, could not recover > 87% without 1L O2. More Ambulation?: Yes  Ambulation 2  Surface - 2: level tile  Device 2:  (HHA)  Other Apparatus 2: O2 (1L)  Assistance 2: Contact guard assistance  Quality of Gait 2: mild unsteadiness but no overt LOB  Gait Deviations: Slow Sandie;Decreased step length;Decreased step height  Distance: 30'  Comments: O2 sats remain 93% and higher on 1L O2.   Stairs/Curb  Stairs?: No Balance  Posture: Good  Sitting - Static: Good  Sitting - Dynamic: Good  Standing - Static: Fair;+  Standing - Dynamic: Fair  Comments: Mild unsteadiness with standing, wide BRAULIO             AM-PAC Score  AM-PAC Inpatient Mobility Raw Score : 20 (08/01/22 1050)  AM-PAC Inpatient T-Scale Score : 47.67 (08/01/22 1050)  Mobility Inpatient CMS 0-100% Score: 35.83 (08/01/22 1050)  Mobility Inpatient CMS G-Code Modifier : CJ (08/01/22 1050)        Goals  Short Term Goals  Time Frame for Short term goals: by d/c - all goals ongoing as of 8/1/22  Short term goal 1: Supine to/from sit with mod I  Short term goal 2: Sit to/from stand with Mod I  Short term goal 3: Amb. x 250' with LRAD with Mod I  Short term goal 4: Up and down 1-2 steps with Supervision  Patient Goals   Patient goals : return home       Education  Patient Education  Education Given To: Patient  Education Provided: Role of Therapy;Plan of Care  Education Provided Comments: D/c plans, do not get up without assist, use O2 when going to the bathroom d/t desats  Education Method: Verbal  Barriers to Learning: None  Education Outcome: Demonstrated understanding      Therapy Time   Individual Concurrent Group Co-treatment   Time In 1025         Time Out 1050         Minutes 25         Timed Code Treatment Minutes: [address] [facility] 28, PT

## 2022-08-01 NOTE — PLAN OF CARE
Problem: Discharge Planning  Goal: Discharge to home or other facility with appropriate resources  8/1/2022 1250 by Paul Eddy RN  Outcome: Progressing  Flowsheets (Taken 8/1/2022 1246)  Discharge to home or other facility with appropriate resources: Identify barriers to discharge with patient and caregiver  8/1/2022 0113 by Tj Barney RN  Outcome: Progressing     Problem: Safety - Adult  Goal: Free from fall injury  8/1/2022 1250 by Paul Eddy RN  Outcome: Progressing  8/1/2022 0113 by Tj Barney RN  Outcome: Progressing  Flowsheets (Taken 8/1/2022 0111)  Free From Fall Injury: Instruct family/caregiver on patient safety     Problem: ABCDS Injury Assessment  Goal: Absence of physical injury  8/1/2022 1250 by Paul Eddy RN  Outcome: Progressing  8/1/2022 0113 by Tj Barney RN  Outcome: Progressing  Flowsheets (Taken 8/1/2022 0111)  Absence of Physical Injury: Implement safety measures based on patient assessment     Problem: Respiratory - Adult  Goal: Achieves optimal ventilation and oxygenation  8/1/2022 1250 by Paul Eddy RN  Outcome: Progressing  Flowsheets (Taken 8/1/2022 1246)  Achieves optimal ventilation and oxygenation: Assess for changes in respiratory status  8/1/2022 0113 by Tj Barney RN  Outcome: Progressing     Problem: Chronic Conditions and Co-morbidities  Goal: Patient's chronic conditions and co-morbidity symptoms are monitored and maintained or improved  8/1/2022 1250 by Paul Eddy RN  Outcome: Progressing  Flowsheets (Taken 8/1/2022 1246)  Care Plan - Patient's Chronic Conditions and Co-Morbidity Symptoms are Monitored and Maintained or Improved: Monitor and assess patient's chronic conditions and comorbid symptoms for stability, deterioration, or improvement  8/1/2022 0113 by Tj Barney RN  Outcome: Progressing     Problem: Skin/Tissue Integrity  Goal: Absence of new skin breakdown  Description: 1.   Monitor for areas of redness and/or skin breakdown  2. Assess vascular access sites hourly  3. Every 4-6 hours minimum:  Change oxygen saturation probe site  4. Every 4-6 hours:  If on nasal continuous positive airway pressure, respiratory therapy assess nares and determine need for appliance change or resting period.   8/1/2022 1250 by Fely Watson RN  Outcome: Progressing  8/1/2022 0113 by Chris Biggs RN  Outcome: Progressing

## 2022-08-01 NOTE — DISCHARGE INSTRUCTIONS
Communication from Nephrology: You were seen by the kidney doctor for MARK (acute kidney injury)    Please call to make an appointment with Abdirizak Leblanc MD in 2 weeks.      [facility]  [id number], [address] [facility]  Phone: [phone]  Fax: [phone]

## 2022-08-01 NOTE — DISCHARGE SUMMARY
Hospital Medicine Discharge Summary    Patient ID: Mal Coad      Patient's PCP: Imelda Medrano MD    Admit Date: 7/26/2022     Discharge Date:   08/01/22      Admitting Provider: Timothy Atwood DO     Discharge Provider: Sally Pendleton MD     Discharge Diagnoses: Active Hospital Problems    Diagnosis     Pneumonia [J18.9]      Priority: Medium    Acute respiratory failure with hypoxia (HCC) [J96.01]        The patient was seen and examined on day of discharge and this discharge summary is in conjunction with any daily progress note from day of discharge. Hospital Course:     80-year-old female past medical history significant for atrial fibrillation, CHF, CAD daily, dyslipidemia presents to the emergency department complaints of shortness of breath and cough. Patient reports shortness of breath and cough for the last 3 days associated with weakness. Subjective fever and chill. Admitted with COPD exacerbation/pneumonia as well as MARK     Acute kidney injury  Likely a combination of nephrotoxicity from medication including lisinopril and Lasix  H creatinine normalized, patient seen by nephrology, resumed on ACE and Lasix     Acute on chronic hypoxic respiratory failure  Sepsis due to Pneumonia    COPD exacerbation  Patient only uses 2 L oxygen at nighttime.    Chest x-ray with left lower lobe infiltrate  Initially started on cefepime and doxycycline, later de-escalated to Levaquin as IV access was lost  Schedule bronchodilators  Oral prednisone 40 mg p.o. for 5 days  Perform home O2 evaluation  Consult pulmonologist for recommendations     Acute on chronic diastolic heart failure  last ECHO: 9/2021 EF 14% Grade 2 Diastolic Dysfunction  Appears to be fairly compensated  Hold Lasix due to MARK, hold lisinopril due to MARK     Atrial Fibrillation   - currently rate controlled  - continue Xarelto, Dofetilide and metoprolol   EP following     Essential (primary) hypertension  - monitor blood pressure  - continue home meds     Hyperlipidemia   - continue statin        Physical Exam Performed:     BP (!) 166/72   Pulse 57   Temp 98 °F (36.7 °C) (Oral)   Resp 28   Ht 5' 4\" (1.626 m)   Wt 231 lb 0.7 oz (104.8 kg)   SpO2 91% Comment: with ambulation  BMI 39.66 kg/m²       General appearance:  No apparent distress, appears stated age and cooperative. HEENT:  Normal cephalic, atraumatic without obvious deformity. Pupils equal, round, and reactive to light. Extra ocular muscles intact. Conjunctivae/corneas clear. Neck: Supple, with full range of motion. No jugular venous distention. Trachea midline. Respiratory:  Normal respiratory effort. Clear to auscultation, bilaterally without Rales/Wheezes/Rhonchi. Cardiovascular:  Regular rate and rhythm with normal S1/S2 without murmurs, rubs or gallops. Abdomen: Soft, non-tender, non-distended with normal bowel sounds. Musculoskeletal:  No clubbing, cyanosis or edema bilaterally. Full range of motion without deformity. Skin: Skin color, texture, turgor normal.  No rashes or lesions. Neurologic:  Neurovascularly intact without any focal sensory/motor deficits. Cranial nerves: II-XII intact, grossly non-focal.  Psychiatric:  Alert and oriented, thought content appropriate, normal insight  Capillary Refill: Brisk,< 3 seconds   Peripheral Pulses: +2 palpable, equal bilaterally       Labs:  For convenience and continuity at follow-up the following most recent labs are provided:      CBC:    Lab Results   Component Value Date/Time    WBC 14.0 08/01/2022 10:25 AM    HGB 10.0 08/01/2022 10:25 AM    HCT 30.9 08/01/2022 10:25 AM     08/01/2022 10:25 AM       Renal:    Lab Results   Component Value Date/Time     08/01/2022 10:25 AM    K 4.0 08/01/2022 10:25 AM    K 3.9 07/08/2022 03:16 PM     08/01/2022 10:25 AM    CO2 31 08/01/2022 10:25 AM    BUN 35 08/01/2022 10:25 AM    CREATININE 1.1 08/01/2022 10:25 AM    CALCIUM 9.2 08/01/2022 10:25 AM PHOS 5.2 07/07/2021 05:55 AM         Significant Diagnostic Studies    Radiology:   XR CHEST PORTABLE   Final Result   Findings most consistent with mild CHF with mild perihilar edema and small   bilateral effusions. US RENAL COMPLETE   Final Result   No sonographic abnormality. XR CHEST PORTABLE   Final Result   New left suprahilar ground-glass opacity may represent developing   consolidation in the appropriate clinical setting. Consults:     PHARMACY TO DOSE VANCOMYCIN  IP CONSULT TO SOCIAL WORK  IP CONSULT TO SPIRITUAL SERVICES  IP CONSULT TO PALLIATIVE CARE  IP CONSULT TO PULMONOLOGY  IP CONSULT TO NEPHROLOGY  IP CONSULT TO HOME CARE NEEDS    Disposition:  home      Condition at Discharge: Stable    Discharge Instructions/Follow-up:    -Home oxygen at 2 L  -Continue home medications including ACE inhibitor and Lasix at home dose  -Home PT/OT  -Continue follow-up with nephrology/cardiology  -Levaquin to complete course of antibiotics      Code Status:  Full Code    Activity: activity as tolerated    Diet: cardiac diet      Discharge Medications:     Current Discharge Medication List             Details   levoFLOXacin (LEVAQUIN) 250 MG tablet Take 1 tablet by mouth in the morning for 2 doses. Qty: 2 tablet, Refills: 0                Details   SYMBICORT 160-4.5 MCG/ACT AERO Inhale 2 puffs into the lungs in the morning and 2 puffs before bedtime. Qty: 1 each, Refills: 11    Associated Diagnoses: COPD, severe ([address]      predniSONE (DELTASONE) 10 MG tablet 40mg for 3days 30mg for 3days 20mg for 3days 10mg for 3days  Qty: 30 tablet, Refills: 0    Associated Diagnoses: COPD, severe (HCC)      aspirin EC 81 MG EC tablet Take 1 tablet by mouth in the morning. Qty: 90 tablet, Refills: 3      isosorbide mononitrate (IMDUR) 30 MG extended release tablet Take 1 tablet by mouth in the morning.  Karla Pheasant: 90 tablet, Refills: 3      hydrALAZINE (APRESOLINE) 10 MG tablet Take 1 tablet by mouth every 8 hours  Qty: 90 tablet, Refills: 0      vitamin C (ASCORBIC ACID) 500 MG tablet Take 500 mg by mouth daily      metoprolol succinate (TOPROL XL) 25 MG extended release tablet TAKE ONE TABLET BY MOUTH DAILY  Qty: 90 tablet, Refills: 3      dofetilide (TIKOSYN) 250 MCG capsule Take 1 capsule by mouth every 12 hours  Qty: 60 capsule, Refills: 5      furosemide (LASIX) 40 MG tablet Take 1 tablet by mouth 2 times daily  Qty: 180 tablet, Refills: 3      lisinopril (PRINIVIL;ZESTRIL) 20 MG tablet Take 1 tablet by mouth daily  Qty: 30 tablet, Refills: 3    Associated Diagnoses: Essential hypertension      albuterol sulfate  (90 Base) MCG/ACT inhaler INHALE TWO PUFFS BY MOUTH EVERY 4 HOURS AS NEEDED FOR WHEEZING OR FOR SHORTNESS OF BREATH  Qty: 1 each, Refills: 3      rosuvastatin (CRESTOR) 20 MG tablet Take 1 tablet by mouth daily  Qty: 90 tablet, Refills: 2    Associated Diagnoses: Hyperlipidemia LDL goal <70      rivaroxaban (XARELTO) 20 MG TABS tablet TAKE ONE TABLET BY MOUTH DAILY WITH BREAKFAST  Qty: 30 tablet, Refills: 11      albuterol (PROVENTIL) (2.5 MG/3ML) 0.083% nebulizer solution Take 3 mLs by nebulization every 4 hours as needed for Wheezing  Qty: 540 mL, Refills: 2    Associated Diagnoses: COPD, severe (HCC)      nitroGLYCERIN (NITROSTAT) 0.4 MG SL tablet Place 1 tablet under the tongue every 5 minutes as needed for Chest pain  Qty: 25 tablet, Refills: 1      Multiple Vitamins-Minerals (MULTI FOR HER 50+ PO) Take 1 tablet by mouth daily             Time Spent on discharge is more than 30 minutes in the examination, evaluation, counseling and review of medications and discharge plan. Signed:    Aspen Miller MD   8/1/2022      Thank you Marlys Whitley MD for the opportunity to be involved in this patient's care. If you have any questions or concerns, please feel free to contact me at [phone].

## 2022-08-01 NOTE — CARE COORDINATION
08/01/22 1353   IMM Letter   IMM Letter given to Patient/Family/Significant other/Guardian/POA/by: second IMM given to pt & , both verbalized Nick Kearns RN   IMM Letter date given: 08/01/22   IMM Letter time given: 1324     IMM provided to patient & spouse at bedside by Jeff Rivera RN CM. Education provided to patient & spouse, they denied any questions and verbalized understanding. They are aware of 4 hours allotted to determine if they choose to pursue Medicare appeal process.      Jeff Rivera RN, BSN,   [phone]  Electronically signed by Jeff Rivera RN on 8/1/2022 at 1:54 PM

## 2022-08-01 NOTE — PLAN OF CARE
Problem: Discharge Planning  Goal: Discharge to home or other facility with appropriate resources  Outcome: Progressing     Problem: Safety - Adult  Goal: Free from fall injury  Outcome: Progressing  Flowsheets (Taken 8/1/2022 0111)  Free From Fall Injury: Instruct family/caregiver on patient safety     Problem: ABCDS Injury Assessment  Goal: Absence of physical injury  Outcome: Progressing  Flowsheets (Taken 8/1/2022 0111)  Absence of Physical Injury: Implement safety measures based on patient assessment     Problem: Respiratory - Adult  Goal: Achieves optimal ventilation and oxygenation  Outcome: Progressing     Problem: Chronic Conditions and Co-morbidities  Goal: Patient's chronic conditions and co-morbidity symptoms are monitored and maintained or improved  Outcome: Progressing     Problem: Skin/Tissue Integrity  Goal: Absence of new skin breakdown  Description: 1. Monitor for areas of redness and/or skin breakdown  2. Assess vascular access sites hourly  3. Every 4-6 hours minimum:  Change oxygen saturation probe site  4. Every 4-6 hours:  If on nasal continuous positive airway pressure, respiratory therapy assess nares and determine need for appliance change or resting period.   Outcome: Progressing

## 2022-08-01 NOTE — CARE COORDINATION
[facility]    DC order noted, all docs needed have been faxed to [facility] for home care services.     Home care to see patient by 8/4/22    Fatoumata Ugalde RN, BSN CTN  [facility] [phone]

## 2022-08-01 NOTE — PROGRESS NOTES
DC order noted. Pt has received > 60 mins of HF education at bedside. HF RN saw pt last admit on 7/12. Pt to continue with [facility]. HF dc instructions added to AVS as well as to Seun Dubose. Cardiology hospital follow up appt scheduled for Friday 8/5 at 1:45 pm. Bedside RN, Oumar Bauman, notified of above.

## 2022-08-02 ENCOUNTER — TELEPHONE (OUTPATIENT)
Dept: FAMILY MEDICINE CLINIC | Age: 77
End: 2022-08-02

## 2022-08-02 ENCOUNTER — CARE COORDINATION (OUTPATIENT)
Dept: CASE MANAGEMENT | Age: 77
End: 2022-08-02

## 2022-08-02 RX ORDER — TRAZODONE HYDROCHLORIDE 50 MG/1
50 TABLET ORAL NIGHTLY PRN
Qty: 24 TABLET | Refills: 0 | Status: ON HOLD | OUTPATIENT
Start: 2022-08-02 | End: 2022-11-03

## 2022-08-02 NOTE — CARE COORDINATION
[facility]    Call within 2 business days of discharge: Yes    Patient: Orly Arrington Patient : 1945   MRN: [id number]  Reason for Admission: COPD exacerbation/PNA  Discharge Date: 22 RARS: Readmission Risk Score: 21.8      Last Discharge [facility]       Date Complaint Diagnosis Description Type Department Provider    22 Shortness of Breath Shortness of breath . .. ED to Hosp-Admission (Discharged) (ADMITTED) MARIA T Banegas MD; Harley Green. .. Spoke with: no one    Facility: [facility] Transitions 24 Hour Call    Do you have all of your prescriptions and are they filled?: Yes  Do you have support at home?: Partner/Spouse/SO  Care Transitions Interventions         Follow Up  Future Appointments   Date Time Provider Kiana Puri   2022 12:00 PM CABRERA Seals CARDIO [facility]   2022  1:45 PM Patricia Dash MD [facility]   2022  1:00 PM Patricia Dash MD [facility]   2022 12:40 PM [facility]   2022  1:00 PM SCHEDULE, VASCULAR LAB 1 [place] VASUZIEL/EN [facility]   2022  3:15 PM Harshad Hoyt MD [place] VASC/EN [facility]   2022  1:00 PM Ady Fontanez [facility]   2022  2:00 PM CHRISTINE Conteh - CNP [facility]       First initial 24 hr follow up outreach call attempt, no answer. CTN could not leave  as mailbox was full. CTN will continue with outreach call attempts.     NIVIA AbramsN, RN   Care Transition Nurse  Mobile: [phone]

## 2022-08-02 NOTE — TELEPHONE ENCOUNTER
----- Message from Yamila Triplett sent at 8/2/2022 11:10 AM EDT -----  Subject: Message to Provider    QUESTIONS  Information for Provider? [facility], requesting   referral for PT, OT and Nursing Care. Patient care, patient declined their   vitals program. Her BP was 167/62 (slighty elevated), and resp were 26,   Please call back with a verbal authorization. [phone]  ---------------------------------------------------------------------------  --------------  Leah Bartholomew INFO  [phone]; OK to leave message on voicemail  ---------------------------------------------------------------------------  --------------  SCRIPT ANSWERS  Relationship to Patient? Third Party  Third Party Type? Home Health Care? Representative Name?  Shamar Burger, [facility]

## 2022-08-02 NOTE — TELEPHONE ENCOUNTER
----- Message from Kofi Palencia sent at 8/2/2022 11:03 AM EDT -----  Subject: Hospital Follow Up    QUESTIONS  [facility]  Date of Discharge? 2022-08-01  Discharge Location? Home  Reason for hospitalization as patient stated? PNEUMONIA  What question does the patient have, if applicable? Patient would like   something to help her sleep. Please call patient and advise. Thanks.  ---------------------------------------------------------------------------  --------------  Sana DUMONT  What is the best way for the office to contact you? OK to leave message on   voicemail  Preferred Call Back Phone Number? [phone]  ---------------------------------------------------------------------------  --------------  SCRIPT ANSWERS  Relationship to Patient? Self  (Patient requests to see provider urgently. )? No  (Has the patient been discharged from the hospital within 2 business days   AND does not have a Telephone Encounter  Follow Up From [address] [place] [address]   documented in [address]?)? Yes  Do you have any questions for your primary care provider that need to be   answered prior to your appointment? (Use RN Triage if question pertains to   anything on the red flag list)? Yes  (Patient needs follow up visit after hospital discharge) Book first   available appointment within 7 days OF DISCHARGE, if no appt, proceed to   book the next available time slot within 14 days OF DISCHARGE AND Send   Message to Provider. [facility] Follow Up appointment cannot be booked   beyond 14 Days and should result in a Message to Provider. ?  No

## 2022-08-03 ENCOUNTER — TELEPHONE (OUTPATIENT)
Dept: PULMONOLOGY | Age: 77
End: 2022-08-03

## 2022-08-03 ENCOUNTER — CARE COORDINATION (OUTPATIENT)
Dept: CASE MANAGEMENT | Age: 77
End: 2022-08-03

## 2022-08-03 NOTE — TELEPHONE ENCOUNTER
SW patient and she was given the information. She does not want to do the walk test since she gets oxygen at home already.

## 2022-08-03 NOTE — TELEPHONE ENCOUNTER
Dara was in the ED for 6 days. Says Dr Fe Taylor ordered a stress test for her. Wants to know if she should still do it. She'd like a call.

## 2022-08-03 NOTE — CARE COORDINATION
[facility] Transitions Initial Follow Up Call    Call within 2 business days of discharge: Yes    Patient: Laurie Rojas Patient : 1945   MRN: [id number]  Reason for Admission: COPD exacerbation, PNA  Discharge Date: 22 RARS: Readmission Risk Score: 21.8      Last Discharge [facility]       Date Complaint Diagnosis Description Type Department Provider    22 Shortness of Breath Shortness of breath . .. ED to Hosp-Admission (Discharged) (ADMITTED) MARIA T Kerr MD; Ramsey Hedrick. .. Spoke with: no one    Facility: [facility] Transitions 24 Hour Call    Do you have all of your prescriptions and are they filled?: Yes  Do you have support at home?: Partner/Spouse/SO  Care Transitions Interventions         Follow Up  Future Appointments   Date Time Provider Kiana Puri   2022  1:45 PM Sherrian Epley, MD [facility]   2022  1:00 PM Sherrian Epley, MD [facility]   2022 12:40 PM [facility]   2022  1:00 PM SCHEDULE, VASCULAR LAB 1 [place] VASC/EN [facility]   2022  3:15 PM Morgan Peabody, MD [place] VASC/EN [facility]   2022  1:00 PM [facility]   2022  2:00 PM Deirdre Perez, APRN - CNP [facility]       Second and final outreach call attempt, no answer. CTN could not leave a VM as mailbox is full today. CTN will resolve episode and remain available. Call placed to [facility] who states they have also been unable to reach pt or family. They have also left  for pt, spouse and daughter. Will route message to PCP to facilitate Hospital follow up appointment.     PERFECTO Napoles, RN   Care Transition Nurse  Mobile: [phone]

## 2022-08-03 NOTE — TELEPHONE ENCOUNTER
I ordered a walk test to see if she needed oxygen. She does not have to do it, if she does not want too.   I was merely seeing if her oxygen levels were running low

## 2022-08-05 ENCOUNTER — OFFICE VISIT (OUTPATIENT)
Dept: CARDIOLOGY CLINIC | Age: 77
End: 2022-08-05
Payer: MEDICARE

## 2022-08-05 VITALS
WEIGHT: 225 LBS | HEIGHT: 64 IN | OXYGEN SATURATION: 99 % | HEART RATE: 60 BPM | DIASTOLIC BLOOD PRESSURE: 74 MMHG | BODY MASS INDEX: 38.41 KG/M2 | SYSTOLIC BLOOD PRESSURE: 142 MMHG

## 2022-08-05 DIAGNOSIS — R06.09 DOE (DYSPNEA ON EXERTION): ICD-10-CM

## 2022-08-05 DIAGNOSIS — I25.10 CORONARY ARTERY DISEASE INVOLVING NATIVE CORONARY ARTERY OF NATIVE HEART WITHOUT ANGINA PECTORIS: ICD-10-CM

## 2022-08-05 DIAGNOSIS — I50.33 ACUTE ON CHRONIC DIASTOLIC CONGESTIVE HEART FAILURE (HCC): Primary | ICD-10-CM

## 2022-08-05 DIAGNOSIS — I10 ESSENTIAL HYPERTENSION: ICD-10-CM

## 2022-08-05 DIAGNOSIS — J44.9 COPD, SEVERE (HCC): ICD-10-CM

## 2022-08-05 DIAGNOSIS — I48.19 PERSISTENT ATRIAL FIBRILLATION (HCC): ICD-10-CM

## 2022-08-05 PROCEDURE — 99214 OFFICE O/P EST MOD 30 MIN: CPT | Performed by: INTERNAL MEDICINE

## 2022-08-05 PROCEDURE — 1090F PRES/ABSN URINE INCON ASSESS: CPT | Performed by: INTERNAL MEDICINE

## 2022-08-05 PROCEDURE — 3023F SPIROM DOC REV: CPT | Performed by: INTERNAL MEDICINE

## 2022-08-05 PROCEDURE — 1036F TOBACCO NON-USER: CPT | Performed by: INTERNAL MEDICINE

## 2022-08-05 PROCEDURE — G8399 PT W/DXA RESULTS DOCUMENT: HCPCS | Performed by: INTERNAL MEDICINE

## 2022-08-05 PROCEDURE — 1111F DSCHRG MED/CURRENT MED MERGE: CPT | Performed by: INTERNAL MEDICINE

## 2022-08-05 PROCEDURE — G8417 CALC BMI ABV UP PARAM F/U: HCPCS | Performed by: INTERNAL MEDICINE

## 2022-08-05 PROCEDURE — 1123F ACP DISCUSS/DSCN MKR DOCD: CPT | Performed by: INTERNAL MEDICINE

## 2022-08-05 PROCEDURE — G8427 DOCREV CUR MEDS BY ELIG CLIN: HCPCS | Performed by: INTERNAL MEDICINE

## 2022-08-05 NOTE — PROGRESS NOTES
[facility]  Office Visit Progress Note    Gage Shannon  1945    August 5, 2022    CC: \"I still have SOB but no other heart symptoms. \"     HPI:  The patient is 69 y.o. female with a past medical history significant for CAD, atrial fib, HTN, aortic aneurysm and COPD who is here for follow up. Admitted 1/7/2018-1/12/2018  With SOB and chest pain and dx with acute on chronic diastolic HF (diuresed), and atrial fib. Troponins elevated, testing pended due to sepsis from the flu. Outpatient stress testing positive and she underwent cardiac cath on 3/2/18 which revealed  of the RCA and nonobstructive CAD of the LAD and LCX. She also underwent endovascular AAA repair on 3/21/18 by Dr. Loni Yadav. Patient was  in the hospital and had 2/25/20 for atrial fibrillation along with signs and symptoms of heart failure. She converted to sinus rhythm after she was started on amiodarone therapy. Continued management per Dr. Kiya Guthrie. S/p PVI ablation 10/2020, she then had Afib ablation last year but had recurrence of A fib She underwent DCCV 1/12/21,  repeat AFIB ablation 2/17/21. continue Xarelto,  Toprol-XL. Flecainide discontinued 5/18/21 with plans for 30 day Event monitor at his f/u with Romy Reis CNP. Admitted 9-9-21 to 9-12-21 acute on chronic diastolic heart failure, pleural effusion and AFIB. DC weight 221#. She continues with off/on SOB per patient's report. Admitted 5/24-5/26/22 atrial fibrillation for dofetilide loading after having multiple ablations, cardioversions (last ablation 2/4/22 ,CV 4/2022) and failing flecainide therapy. She was started on 250mcg of dofetilide Q12H. Her QTc was monitored closely without any significant changes or need for dose adjustment. Electrolytes also monitored daily and replaced if needed. She did convert to sinus rhythm on 5/25/22 with improvement in symptoms. QTc remains acceptable today.  She was discharged on 250mcg of dofetilide Q12H with a BMP in 1 week. Presented to [facility] ED 7/8 with worsening SOB and admitted for acute on chronic diastolic heart failure. Started on IV lasix and diuresed. Developed MARK. Diuresed 228 lbs. DC weight total 4L    Readmitted 7/26/22-8/1/22 worsening SOB and cough prior 3 days, weakness admitted COPD exacerbation/PNA/MARK/acute on chronic dCHF. Prsents today in hospital follow up and to discuss candidacy for CardioMEMS heart failure device implant and ischemic workup. She is accompanied by her  today. She is on 2L per NC since discharge from the hospital. She notes SOB but denies any other cardiac symptoms. Patient denies exertional chest pain/pressure, dyspnea at rest, PND, orthopnea, palpitations, lightheadedness, weight changes, changes in LE edema, and syncope. Medical therapy compliance and tolerating. Review of Systems:  Constitutional: Denies falls, night sweats or fever. Fatigue improved. HEENT: Denies new visual changes, ringing in ears, nosebleeds, nasal congestion  Respiratory: + SOBE. Cardiovascular: see HPI  GI: Denies N/V, diarrhea, constipation, abdominal pain, change in bowel habits, melena or hematochezia  : Denies urinary frequency, urgency, incontinence, hematuria or dysuria. Skin: Denies rash, hives, or cyanosis  Musculoskeletal: Denies joint or muscle aches/pain  Neurological: Denies syncope or TIA-like symptoms.   Psychiatric: Denies anxiety or depression, negative insomnia     Past Medical History:   Diagnosis Date    AAA (abdominal aortic aneurysm) ([address]     pt states it is 4cm    AAA (abdominal aortic aneurysm) without rupture (HCC) 2/10/2015    Atrial fibrillation (HCC)     CAD (coronary artery disease)     CHF (congestive heart failure) (HCC)     COPD (chronic obstructive pulmonary disease) ([address]     History of blood clots     Hyperlipidemia     Hypertension      Past Surgical History:   Procedure Laterality Date    ABDOMINAL AORTIC ANEURYSM REPAIR      Endovascular abdominal AA    APPENDECTOMY      incidental    BLADDER SUSPENSION      CARDIAC SURGERY      CATARACT REMOVAL      CHOLECYSTECTOMY  10/15/13    COLONOSCOPY  07    dr Cristiano Conteh and check in 5 years. COLONOSCOPY  2017    ok dr arndt, repeat 5 years    HYSTERECTOMY ([facility])      for benign tumor. just the uterus    JOINT REPLACEMENT  2013    right knee replacement    TONSILLECTOMY  as a child    TUMOR EXCISION      benign behind right ear about      Family History   Problem Relation Age of Onset    Heart Disease Father     Hypertension Other      Social History     Tobacco Use    Smoking status: Former     Packs/day: 1.00     Years: 37.00     Pack years: 37.00     Types: Cigarettes     Start date: 1976     Quit date: 2013     Years since quittin.8    Smokeless tobacco: Never    Tobacco comments:     E cigarette now and then   Vaping Use    Vaping Use: Some days    Substances: Nicotine   Substance Use Topics    Alcohol use: No    Drug use: No       Allergies   Allergen Reactions    Morphine Rash     rash     Current Outpatient Medications   Medication Sig Dispense Refill    traZODone (DESYREL) 50 MG tablet Take 1 tablet by mouth nightly as needed for Sleep 24 tablet 0    SYMBICORT 160-4.5 MCG/ACT AERO Inhale 2 puffs into the lungs in the morning and 2 puffs before bedtime. 1 each 11    aspirin EC 81 MG EC tablet Take 1 tablet by mouth in the morning. 90 tablet 3    isosorbide mononitrate (IMDUR) 30 MG extended release tablet Take 1 tablet by mouth in the morning.  . 90 tablet 3    hydrALAZINE (APRESOLINE) 10 MG tablet Take 1 tablet by mouth every 8 hours 90 tablet 0    vitamin C (ASCORBIC ACID) 500 MG tablet Take 500 mg by mouth daily      metoprolol succinate (TOPROL XL) 25 MG extended release tablet TAKE ONE TABLET BY MOUTH DAILY 90 tablet 3    dofetilide (TIKOSYN) 250 MCG capsule Take 1 capsule by mouth every 12 hours 60 capsule 5    furosemide (LASIX) 40 MG tablet Take 1 tablet by mouth 2 times daily 180 tablet 3    lisinopril (PRINIVIL;ZESTRIL) 20 MG tablet Take 1 tablet by mouth daily 30 tablet 3    albuterol sulfate  (90 Base) MCG/ACT inhaler INHALE TWO PUFFS BY MOUTH EVERY 4 HOURS AS NEEDED FOR WHEEZING OR FOR SHORTNESS OF BREATH 1 each 3    rosuvastatin (CRESTOR) 20 MG tablet Take 1 tablet by mouth daily 90 tablet 2    rivaroxaban (XARELTO) 20 MG TABS tablet TAKE ONE TABLET BY MOUTH DAILY WITH BREAKFAST 30 tablet 11    albuterol (PROVENTIL) (2.5 MG/3ML) 0.083% nebulizer solution Take 3 mLs by nebulization every 4 hours as needed for Wheezing 540 mL 2    nitroGLYCERIN (NITROSTAT) 0.4 MG SL tablet Place 1 tablet under the tongue every 5 minutes as needed for Chest pain 25 tablet 1    Multiple Vitamins-Minerals (MULTI FOR HER 50+ PO) Take 1 tablet by mouth daily       No current facility-administered medications for this visit. Physical Exam:   BP (!) 142/74 Comment: recheck  Pulse 60   Ht 5' 4\" (1.626 m)   Wt 225 lb (102.1 kg)   SpO2 99%   BMI 38.62 kg/m²   Wt Readings from Last 2 Encounters:   08/05/22 225 lb (102.1 kg)   08/01/22 231 lb 0.7 oz (104.8 kg)     Physical Exam:   Constitutional: The patient is oriented to person, place, and time. Appears well-developed and well-nourished. In no acute distress. Head: Normocephalic and atraumatic. Pupils equal and round. Neck: Neck supple. No JVP or carotid bruit appreciated. No mass and no thyromegaly present. No lymphadenopathy present. Cardiovascular: Normal rate and regular rhythm. Normal heart sounds. Exam reveals no gallop and no friction rub. No murmur heard. Pulmonary/Chest: Effort normal and breath sounds normal. No respiratory distress. - wheezes, no rhonchi, +scattered crackles. Abdominal: Soft, non-tender. Bowel sounds are normal. Exhibits no organomegaly, mass or bruit. Extremities: no BLE edema, L>R. No cyanosis or clubbing.  Pulses are 2+ radial and carotid bilaterally. Neurological: No gross cranial nerve deficit. Coordination normal.   Skin: Skin is warm and dry. There is no rash or diaphoresis. Psychiatric: Patient has a normal mood and affect. Speech is normal and behavior is normal.     Lab Review:   Lab Results   Component Value Date/Time    TRIG 59 09/10/2021 07:22 AM    HDL 56 09/10/2021 07:22 AM    HDL 38 2011 03:35 PM    LDLCALC 39 09/10/2021 07:22 AM    LDLDIRECT 111 2012 11:32 AM    LABVLDL 12 09/10/2021 07:22 AM     Lab Results   Component Value Date/Time     2022 10:25 AM    K 4.0 2022 10:25 AM    K 3.9 2022 03:16 PM     2022 10:25 AM    CO2 31 2022 10:25 AM    BUN 35 2022 10:25 AM    CREATININE 1.1 2022 10:25 AM    GLUCOSE 103 2022 10:25 AM    GLUCOSE 102 2016 01:03 PM    CALCIUM 9.2 2022 10:25 AM      Lab Results   Component Value Date    WBC 14.0 (H) 2022    HGB 10.0 (L) 2022    HCT 30.9 (L) 2022    MCV 89.2 2022     2022       EK2018: Sinus rhythm with old anterior infarct  18: Sinus Rhythm, PACs, Old anterior infarct. 19: Sinus Rhythm  20: Sinus  Rhythm  - occasional PAC, # PACs = 1, -Anteroseptal infarct -age undetermined.    -Nonspecific ST depression  -Nondiagnostic. 21: Sinus rhythm  -First degree A-V block , -Anteroseptal infarct -age undetermined. 22: SR   22: not completed today     Echo 2018:  Mild concentric left ventricular hypertrophy. Visually estimated ejection fraction of 65%. Mitral annular calcification. Mild left atrial dilation. Mild aortic stenosis. (mean gradients [id number])  The systolic pulmonary artery pressure (SPAP) estimated at 30 mmHg  (estimated RA pressure of 8 mmHg included).     [facility]: ([address]) 2017   of RCA chronic LAD 10-20% RA 4 PA24/9 16 wedge 9 LVEDP markedly elevated 30    Carotid US 10/2017 at [address]:  1. Estimated diameter reduction of the right internal carotid artery is  less than 50%. 2.  Estimated diameter reduction of the left internal carotid artery is  50-69%. 3.  The bilateral common carotid arteries reveal no evidence of   significant stenosis. 4.  There is greater than 50% stenosis of the right external carotid  artery. 5.  There is no evidence of significant stenosis in the left external  carotid artery. 6.  The bilateral vertebral arteries are patent with antegrade flow. 7.  The bilateral subclavian arteries reveal no evidence of significant  stenosis. 8.  There has been no significant change from the previous study of  4/21/2017. Lexiscan 2/19/18   Summary    Abnormal study. There is a moderate sized, mild intensity, partially    reversible defect of the mid to apical anterior and mid anterolateral walls    which is consistent with ischemia. There is breast attenuation but stress    images appear worse. Normal LV size and systolic function. Overall findings represent an intermediate risk study     Cardiac Cath 3/5/18  OVERALL IMPRESSION  1. Again, 100% proximal right coronary artery occlusion with left to right  collaterals. 2.  Mild disease of the distal left main trunk. 3.  20% to 30% ostial left anterior descending artery stenosis with  collaterals from LAD to the right coronary artery. 4.  30% to 40% stenosis of the proximal circumflex artery. 5.  Normal left ventricular systolic function with estimated EF of 55% to  60%. 6.  Slightly enlarged aortic root with no evidence of aortic stenosis or  regurgitation. In view of the above findings, we will okay the patient for her upcoming  aortic aneurysm surgery from cardiac standpoint. ECHO 1/2/20  There is moderate concentric left ventricular hypertrophy. Patient appears to be in atrial fibrillation. Ejection fraction is estimated to be 50-60%. Indeterminate diastolic function. Mild aortic regurgitation. Mild tricuspid regurgitation.   Mild mitral today for CardioMEMS. Referred by: myself   Screening process completed. NYHA Class III and has had at least 1 hospitalization for HF in the last 12 mos. Patient is able to take dual antiplatelets or anticoagulants for 1 month post implant. Initial education provided: \"Better Heart failure Management from the [facility] Your [facility]\" brochure, overview video and automatic text messaging. Discussed risks and benefits of the procedure, recovery and requirements for monitoring post implant. Risks are rare but include arrhythmias, bleeding, death, device embolization, hematoma, infection, myocardial infarction, stroke, thrombus. Patient agreeable to proceed. Will initiate prior authorization process and set implant date when approved. Severe COPD/Asthma managed per Dr. Jordan. + crackles scattered and  no BLE edema on physical exam. Follow up 9/1/22 scheduled with Pulmonary f/u. Coronary artery disease involving native coronary artery of native heart without angina pectoris  -She denies any signs or symptoms of angina today but continues with  LI, chronic. See above testing.   -Continue medical management with BB, ACEI, statin, Imdur, hydralainze and PRN SL Nitro.   -She previously reported myalgias with Crestor with resolution of cramping.   -Lipid profile 9/10/21 wnl. Will repeat with next blood draw  -will proceed with ischemic workup with Claudia Horn to r/o progressive CAD prior to [facility] HF implant. Essential hypertension  -Blood pressure is stable today on medical therapy. Encouraged on low salt diet. Persistent atrial fibrillation   -CHADS VASC score 7 for age, gender, DVT, CHF, HTN, CAD.   -Managed per Dr. Misti Whitman.  S/p PVI ablation 10/2020, she then had Afib ablation last year but had recurrence of A fib She underwent DCCV 1/12/21,  repeat AFIB ablation 2/17/21, DCCV 4/2022.   -5/24/22 admitted for dofetilide loading after having multiple ablations, cardioversions and failing flecainide therapy. She was started on 250mcg of dofetilide Q12H. Her QTc was monitored closely without any significant changes or need for dose adjustment. Electrolytes also monitored daily and replaced if needed. She did convert to sinus rhythm on 5/25/22 with improvement in symptoms. QTc remains acceptable today. She will be discharged on 250mcg of dofetilide Q12H with a BMP in 1 week.   -presents today with worsening SOB  -physical exam regular rate/rhythm. AAA (abdominal aortic aneurysm) without rupture   -Underwent endovascular AAA repair on 3/21/18 by Dr. Christie Hudson. Follows vascular surgery    Sleep apnea  -Intolerant to CPAP. We will call with test results and discuss further recommendations and plan moving forward. Thank you very much for allowing me to participate in the care of your patient. Please do not hesitate to contact me if you have any questions. Sincerely,  Hawa Phelps MD      [facility], [address] [place] [address]  Ph: [phone]  Fax: [phone]    This note was scribed in the presence of Dr. Jaron Freeman MD by Iker Keane RN.

## 2022-08-19 DIAGNOSIS — I10 ESSENTIAL HYPERTENSION: ICD-10-CM

## 2022-08-19 RX ORDER — LISINOPRIL 20 MG/1
TABLET ORAL
Qty: 90 TABLET | Refills: 3 | Status: ON HOLD | OUTPATIENT
Start: 2022-08-19

## 2022-08-19 NOTE — TELEPHONE ENCOUNTER
Last OV:2022  Last Labs:2022  Last Refills:2022  Next Appt:2022  Last EK2022    lisinopril (PRINIVIL;ZESTRIL) 20 MG tablet

## 2022-09-01 ENCOUNTER — OFFICE VISIT (OUTPATIENT)
Dept: PULMONOLOGY | Age: 77
End: 2022-09-01
Payer: MEDICARE

## 2022-09-01 VITALS
HEART RATE: 80 BPM | WEIGHT: 225 LBS | HEIGHT: 64 IN | SYSTOLIC BLOOD PRESSURE: 125 MMHG | RESPIRATION RATE: 20 BRPM | OXYGEN SATURATION: 98 % | TEMPERATURE: 97.5 F | BODY MASS INDEX: 38.41 KG/M2 | DIASTOLIC BLOOD PRESSURE: 75 MMHG

## 2022-09-01 DIAGNOSIS — J96.11 CHRONIC RESPIRATORY FAILURE WITH HYPOXIA (HCC): Primary | ICD-10-CM

## 2022-09-01 DIAGNOSIS — Z87.891 PERSONAL HISTORY OF TOBACCO USE: ICD-10-CM

## 2022-09-01 DIAGNOSIS — J44.9 COPD, SEVERE (HCC): ICD-10-CM

## 2022-09-01 PROCEDURE — 1123F ACP DISCUSS/DSCN MKR DOCD: CPT | Performed by: INTERNAL MEDICINE

## 2022-09-01 PROCEDURE — 1036F TOBACCO NON-USER: CPT | Performed by: INTERNAL MEDICINE

## 2022-09-01 PROCEDURE — 1090F PRES/ABSN URINE INCON ASSESS: CPT | Performed by: INTERNAL MEDICINE

## 2022-09-01 PROCEDURE — 3023F SPIROM DOC REV: CPT | Performed by: INTERNAL MEDICINE

## 2022-09-01 PROCEDURE — 99214 OFFICE O/P EST MOD 30 MIN: CPT | Performed by: INTERNAL MEDICINE

## 2022-09-01 PROCEDURE — G8399 PT W/DXA RESULTS DOCUMENT: HCPCS | Performed by: INTERNAL MEDICINE

## 2022-09-01 PROCEDURE — G8427 DOCREV CUR MEDS BY ELIG CLIN: HCPCS | Performed by: INTERNAL MEDICINE

## 2022-09-01 PROCEDURE — G8417 CALC BMI ABV UP PARAM F/U: HCPCS | Performed by: INTERNAL MEDICINE

## 2022-09-01 RX ORDER — PREDNISONE 10 MG/1
TABLET ORAL
Qty: 30 TABLET | Refills: 0 | Status: SHIPPED
Start: 2022-09-01 | End: 2022-09-07

## 2022-09-01 ASSESSMENT — COPD QUESTIONNAIRES
QUESTION5_HOMEACTIVITIES: 4
QUESTION7_SLEEPQUALITY: 1
CAT_TOTALSCORE: 23
QUESTION1_COUGHFREQUENCY: 2
QUESTION8_ENERGYLEVEL: 1
QUESTION6_LEAVINGHOUSE: 5
QUESTION3_CHESTTIGHTNESS: 1
QUESTION2_CHESTPHLEGM: 4
QUESTION4_WALKINCLINE: 5

## 2022-09-01 NOTE — PATIENT INSTRUCTIONS
Take prednisone    Continue with inhalers and breathing treatments    CT Chest    Keep appt in November      What is lung cancer screening? Lung cancer screening is a way in which doctors check the lungs for early signs of cancer in people who have no symptoms of lung cancer. A low-dose CT scan uses much less radiation than a normal CT scan and shows a more detailed image of the lungs than a standard X-ray. The goal of lung cancer screening is to find cancer early, before it has a chance to grow, spread, or cause problems. One large study found that smokers who were screened with low-dose CT scans were less likely to die of lung cancer than those who were screened with standard X-ray. Below is a summary of the things you need to know regarding screening for lung cancer with low-dose computed tomography (LDCT). This is a screening program that involves routine annual screening with LDCT studies of the lung. The LDCTs are done using low-dose radiation that is not thought to increase your cancer risk. If you have other serious medical conditions (other cancers, congestive heart failure) that limit your life expectancy to less than 10 years, you should not undergo lung cancer screening with LDCT. The chance is 20%-60% that the LDCT result will show abnormalities. This would require additional testing which could include repeat imaging or even invasive procedures. Most (about 95%) of \"abnormal\" LDCT results are false in the sense that no lung cancer is ultimately found. Additionally, some (about 10%) of the cancers found would not affect your life expectancy, even if undetected and untreated. If you are still smoking, the single most important thing that you can do to reduce your risk of dying of lung cancer is to quit. For this screening to be covered by Medicare and most other insurers, strict criteria must be met.   If you do not meet these criteria, but still wish to undergo LDCT testing, you will be required to sign a waiver indicating your willingness to pay for the scan.

## 2022-09-01 NOTE — PROGRESS NOTES
Chief complaint  This is a 69y.o. year old female  who comes to see me with a chief complaint of   Chief Complaint   Patient presents with    Follow-up    COPD       HPI  Here with CC of COPD    Been struggling to breathe of late. Been like this for 2 weeks. More SOB. Not feeling ill, rarely productive mucus. Breathing treatments seem to help. Now on oxygen when before she was not. Has had two admissions this summer. Says she is having a heart procedure next week.  is present. Also on symbicort. Weight has not changed that she knows of. Last admission was treated for pneumonia     Current Outpatient Medications   Medication Sig Dispense Refill    predniSONE (DELTASONE) 10 MG tablet 40mg for 3days 30mg for 3days 20mg for 3days 10mg for 3days 30 tablet 0    lisinopril (PRINIVIL;ZESTRIL) 20 MG tablet TAKE ONE TABLET BY MOUTH DAILY 90 tablet 3    traZODone (DESYREL) 50 MG tablet Take 1 tablet by mouth nightly as needed for Sleep 24 tablet 0    SYMBICORT 160-4.5 MCG/ACT AERO Inhale 2 puffs into the lungs in the morning and 2 puffs before bedtime. 1 each 11    aspirin EC 81 MG EC tablet Take 1 tablet by mouth in the morning. 90 tablet 3    isosorbide mononitrate (IMDUR) 30 MG extended release tablet Take 1 tablet by mouth in the morning.  . 90 tablet 3    hydrALAZINE (APRESOLINE) 10 MG tablet Take 1 tablet by mouth every 8 hours 90 tablet 0    vitamin C (ASCORBIC ACID) 500 MG tablet Take 500 mg by mouth daily      metoprolol succinate (TOPROL XL) 25 MG extended release tablet TAKE ONE TABLET BY MOUTH DAILY 90 tablet 3    dofetilide (TIKOSYN) 250 MCG capsule Take 1 capsule by mouth every 12 hours 60 capsule 5    furosemide (LASIX) 40 MG tablet Take 1 tablet by mouth 2 times daily 180 tablet 3    albuterol sulfate  (90 Base) MCG/ACT inhaler INHALE TWO PUFFS BY MOUTH EVERY 4 HOURS AS NEEDED FOR WHEEZING OR FOR SHORTNESS OF BREATH 1 each 3    rosuvastatin (CRESTOR) 20 MG tablet Take 1 tablet by mouth daily 90 tablet 2    rivaroxaban (XARELTO) 20 MG TABS tablet TAKE ONE TABLET BY MOUTH DAILY WITH BREAKFAST 30 tablet 11    albuterol (PROVENTIL) (2.5 MG/3ML) 0.083% nebulizer solution Take 3 mLs by nebulization every 4 hours as needed for Wheezing 540 mL 2    nitroGLYCERIN (NITROSTAT) 0.4 MG SL tablet Place 1 tablet under the tongue every 5 minutes as needed for Chest pain 25 tablet 1    Multiple Vitamins-Minerals (MULTI FOR HER 50+ PO) Take 1 tablet by mouth daily       No current facility-administered medications for this visit. PHYSICAL EXAM:  Vitals:    09/01/22 1224   BP: 125/75   Pulse: 80   Resp: 20   Temp: 97.5 °F (36.4 °C)   SpO2: 98%       PE  GENERAL:  Well nourished, alert  HEENT:  No scleral icterus, no conjunctival irritation  NECK:  No thyromegaly, no bruits  LYMPH:  No cervical or supraclavicular adenopathy  HEART:  Regular rate and rhythm, no murmurs. Distant heart sounds   LUNGS:  Very diminished, no wheezing   ABDOMEN:  No distention, no organomegaly  EXTREMITIES: + edema, no digital clubbing  NEURO:  No localizing deficits, CN II-XII intact    Pulmonary Function Testing 7/2015  Severe obstruction with no response to bronchodilators    Moderate Restriction    Moderate Reduction in diffusing capacity     Chest imaging from 7/22 is reviewed. My impression is suspicion of edema and possible airspace disease. 6 MWT 9/2020  The patient ambulated 122 meters which is abnormal- 34-%   predicted. Her, heart rate response was normal, the blood   pressure response was normal, oxygen saturations were normal.     The patient desaturated to 93% while ambulating on room air. The degree of symptoms based on the Danielle Dyspnea/Fatigue scale   were increased with testing. This test does not indicate the   need for supplemental oxygen. I reviewed the above labs and images    ABG 6/22:  7.45/45/72    Assessment/Plan:  1. COPD, severe ([facility])  Could be on verge of flare up.   Prednisone burst.  Nebs q 4.  Symbicort  - predniSONE (DELTASONE) 10 MG tablet; 40mg for 3days 30mg for 3days 20mg for 3days 10mg for 3days  Dispense: 30 tablet; Refill: 0    2. Chronic respiratory failure with hypoxia (HCC)  Now using up to 3 liters of oxygen with benefit. 3. Personal history of tobacco use  CT Chest for screening and to evaluated current issues related to breathing. Ie infection vs edema vs other.   - CT Lung Screen (Annual); Future    Pulmonary Rehab:  Politely declined    Lung Cancer Screening CT:  9/2021    Has follow up in 2 months    Low Dose CT (LDCT) Lung Screening criteria met:     Age 50-77(Medicare) or 50-80 ([facility])   Pack year smoking >20   Still smoking or less than 15 year since quit   No sign or symptoms of lung cancer   > 11 months since last LDCT     Risks and benefits of lung cancer screening with LDCT scans discussed:    Significance of positive screen - False-positive LDCT results often occur. 95% of all positive results do not lead to a diagnosis of cancer. Usually further imaging can resolve most false-positive results; however, some patients may require invasive procedures. Over diagnosis risk - 10% to 12% of screen-detected lung cancer cases are over diagnosed--that is, the cancer would not have been detected in the patient's lifetime without the screening. Need for follow up screens annually to continue lung cancer screening effectiveness     Risks associated with radiation from annual LDCT- Radiation exposure is about the same as for a mammogram, which is about 1/3 of the annual background radiation exposure from everyday life. Starting screening at age 54 is not likely to increase cancer risk from radiation exposure. Patients with comorbidities resulting in life expectancy of < 10 years, or that would preclude treatment of an abnormality identified on CT, should not be screened due to lack of benefit.     To obtain maximal benefit from this screening, smoking cessation and long-term abstinence from smoking is critical

## 2022-09-02 NOTE — CARE COORDINATION
Advance Care Planning     Advance Care Planning Activator (Inpatient)  Conversation Note      Date of ACP Conversation: 7/28/2022     Conversation Conducted with: Patient with Decision Making Capacity    ACP Activator: ERON Dasilva    Health Care Decision Maker:     Current Designated Health Care Decision Maker:     Primary Decision Maker: Yamila Will Spouse - [phone]    Secondary Decision Maker: Tianna Davis Child - [phone]    Care Preferences    Ventilation: \"If you were in your present state of health and suddenly became very ill and were unable to breathe on your own, what would your preference be about the use of a ventilator (breathing machine) if it were available to you? \"      Would the patient desire the use of ventilator (breathing machine)?: yes    \"If your health worsens and it becomes clear that your chance of recovery is unlikely, what would your preference be about the use of a ventilator (breathing machine) if it were available to you? \"     Would the patient desire the use of ventilator (breathing machine)?: No      Resuscitation  \"CPR works best to restart the heart when there is a sudden event, like a heart attack, in someone who is otherwise healthy. Unfortunately, CPR does not typically restart the heart for people who have serious health conditions or who are very sick. \"    \"In the event your heart stopped as a result of an underlying serious health condition, would you want attempts to be made to restart your heart (answer \"yes\" for attempt to resuscitate) or would you prefer a natural death (answer \"no\" for do not attempt to resuscitate)? \" yes       [] Yes   [] No   Educated Patient / Shaista Jon regarding differences between Advance Directives and portable DNR orders.     Length of ACP Conversation in minutes:  2    Conversation Outcomes:  [x] ACP discussion completed  [] Existing advance directive reviewed with patient; no changes to patient's previously recorded wishes  [] New Advance Directive completed  [] Portable Do Not Rescitate prepared for Provider review and signature  [] POLST/POST/MOLST/MOST prepared for Provider review and signature      Follow-up plan:    [] Schedule follow-up conversation to continue planning  [] Referred individual to Provider for additional questions/concerns   [] Advised patient/agent/surrogate to review completed ACP document and update if needed with changes in condition, patient preferences or care setting    [x] This note routed to one or more involved healthcare providers Erin Erazo MD   Primary care physician. Respectfully submitted,    PRATIMA Macedo-S  [facility]   [phone]    Electronically signed by ERON Tidwell on 7/28/2022 at 10:29 AM show

## 2022-09-06 ENCOUNTER — HOSPITAL ENCOUNTER (OUTPATIENT)
Dept: NON INVASIVE DIAGNOSTICS | Age: 77
Discharge: HOME | End: 2022-09-06
Payer: MEDICARE

## 2022-09-06 DIAGNOSIS — I50.33 ACUTE ON CHRONIC DIASTOLIC CONGESTIVE HEART FAILURE (HCC): ICD-10-CM

## 2022-09-06 DIAGNOSIS — I25.10 CORONARY ARTERY DISEASE INVOLVING NATIVE CORONARY ARTERY OF NATIVE HEART WITHOUT ANGINA PECTORIS: ICD-10-CM

## 2022-09-06 DIAGNOSIS — R06.09 DOE (DYSPNEA ON EXERTION): ICD-10-CM

## 2022-09-06 LAB
LV EF: 76 %
LVEF MODALITY: NORMAL

## 2022-09-06 PROCEDURE — 6360000002 HC RX W HCPCS: Performed by: INTERNAL MEDICINE

## 2022-09-06 PROCEDURE — 93005 ELECTROCARDIOGRAM TRACING: CPT

## 2022-09-06 PROCEDURE — 93017 CV STRESS TEST TRACING ONLY: CPT

## 2022-09-06 PROCEDURE — 78452 HT MUSCLE IMAGE SPECT MULT: CPT

## 2022-09-06 PROCEDURE — 3430000000 HC RX DIAGNOSTIC RADIOPHARMACEUTICAL: Performed by: INTERNAL MEDICINE

## 2022-09-06 PROCEDURE — A9502 TC99M TETROFOSMIN: HCPCS | Performed by: INTERNAL MEDICINE

## 2022-09-06 RX ADMIN — TETROFOSMIN 10 MILLICURIE: 1.38 INJECTION, POWDER, LYOPHILIZED, FOR SOLUTION INTRAVENOUS at 09:15

## 2022-09-06 RX ADMIN — REGADENOSON 0.4 MG: 0.08 INJECTION, SOLUTION INTRAVENOUS at 10:41

## 2022-09-06 RX ADMIN — TETROFOSMIN 30 MILLICURIE: 1.38 INJECTION, POWDER, LYOPHILIZED, FOR SOLUTION INTRAVENOUS at 10:38

## 2022-09-06 NOTE — CARE COORDINATION
Patient arrived for opt strress nuclear test.  Pt brought O2 from home but battery had depleted. Call placed to Dr Maria E Mendoza office and received a faxed order for patient to be placed on 3 liters O2.

## 2022-09-07 ENCOUNTER — TELEPHONE (OUTPATIENT)
Dept: CARDIOLOGY CLINIC | Age: 77
End: 2022-09-07

## 2022-09-07 ENCOUNTER — OFFICE VISIT (OUTPATIENT)
Dept: CARDIOLOGY CLINIC | Age: 77
End: 2022-09-07
Payer: MEDICARE

## 2022-09-07 VITALS
SYSTOLIC BLOOD PRESSURE: 126 MMHG | HEIGHT: 64 IN | OXYGEN SATURATION: 97 % | DIASTOLIC BLOOD PRESSURE: 72 MMHG | WEIGHT: 226 LBS | BODY MASS INDEX: 38.58 KG/M2 | HEART RATE: 74 BPM

## 2022-09-07 DIAGNOSIS — I50.33 ACUTE ON CHRONIC DIASTOLIC CONGESTIVE HEART FAILURE (HCC): ICD-10-CM

## 2022-09-07 DIAGNOSIS — R94.39 ABNORMAL STRESS TEST: ICD-10-CM

## 2022-09-07 DIAGNOSIS — I48.92 ATRIAL FLUTTER, UNSPECIFIED TYPE (HCC): ICD-10-CM

## 2022-09-07 DIAGNOSIS — J44.9 COPD, SEVERE (HCC): ICD-10-CM

## 2022-09-07 DIAGNOSIS — I25.119 CORONARY ARTERY DISEASE INVOLVING NATIVE CORONARY ARTERY OF NATIVE HEART WITH ANGINA PECTORIS (HCC): ICD-10-CM

## 2022-09-07 DIAGNOSIS — R06.00 DYSPNEA, UNSPECIFIED TYPE: Primary | ICD-10-CM

## 2022-09-07 DIAGNOSIS — E78.5 HYPERLIPIDEMIA LDL GOAL <70: ICD-10-CM

## 2022-09-07 DIAGNOSIS — I71.40 AAA (ABDOMINAL AORTIC ANEURYSM) WITHOUT RUPTURE: ICD-10-CM

## 2022-09-07 DIAGNOSIS — I48.91 ATRIAL FIBRILLATION, UNSPECIFIED TYPE (HCC): ICD-10-CM

## 2022-09-07 DIAGNOSIS — R06.00 DYSPNEA, UNSPECIFIED TYPE: ICD-10-CM

## 2022-09-07 DIAGNOSIS — I10 ESSENTIAL HYPERTENSION: ICD-10-CM

## 2022-09-07 LAB
ANION GAP SERPL CALCULATED.3IONS-SCNC: 12 MMOL/L (ref 3–16)
BUN BLDV-MCNC: 37 MG/DL (ref 7–20)
CALCIUM SERPL-MCNC: 9.4 MG/DL (ref 8.3–10.6)
CHLORIDE BLD-SCNC: 97 MMOL/L (ref 99–110)
CO2: 31 MMOL/L (ref 21–32)
CREAT SERPL-MCNC: 1.2 MG/DL (ref 0.6–1.2)
GFR AFRICAN AMERICAN: 53
GFR NON-AFRICAN AMERICAN: 44
GLUCOSE BLD-MCNC: 87 MG/DL (ref 70–99)
HCT VFR BLD CALC: 32.2 % (ref 36–48)
HEMOGLOBIN: 10.4 G/DL (ref 12–16)
MCH RBC QN AUTO: 28.8 PG (ref 26–34)
MCHC RBC AUTO-ENTMCNC: 32.4 G/DL (ref 31–36)
MCV RBC AUTO: 88.8 FL (ref 80–100)
PDW BLD-RTO: 16.6 % (ref 12.4–15.4)
PLATELET # BLD: 249 K/UL (ref 135–450)
PMV BLD AUTO: 9.2 FL (ref 5–10.5)
POTASSIUM SERPL-SCNC: 4.9 MMOL/L (ref 3.5–5.1)
RBC # BLD: 3.63 M/UL (ref 4–5.2)
SODIUM BLD-SCNC: 140 MMOL/L (ref 136–145)
WBC # BLD: 14.6 K/UL (ref 4–11)

## 2022-09-07 PROCEDURE — 3023F SPIROM DOC REV: CPT | Performed by: INTERNAL MEDICINE

## 2022-09-07 PROCEDURE — 1090F PRES/ABSN URINE INCON ASSESS: CPT | Performed by: INTERNAL MEDICINE

## 2022-09-07 PROCEDURE — G8427 DOCREV CUR MEDS BY ELIG CLIN: HCPCS | Performed by: INTERNAL MEDICINE

## 2022-09-07 PROCEDURE — 99215 OFFICE O/P EST HI 40 MIN: CPT | Performed by: INTERNAL MEDICINE

## 2022-09-07 PROCEDURE — 1123F ACP DISCUSS/DSCN MKR DOCD: CPT | Performed by: INTERNAL MEDICINE

## 2022-09-07 PROCEDURE — G8417 CALC BMI ABV UP PARAM F/U: HCPCS | Performed by: INTERNAL MEDICINE

## 2022-09-07 PROCEDURE — G8399 PT W/DXA RESULTS DOCUMENT: HCPCS | Performed by: INTERNAL MEDICINE

## 2022-09-07 PROCEDURE — 1036F TOBACCO NON-USER: CPT | Performed by: INTERNAL MEDICINE

## 2022-09-07 NOTE — PROGRESS NOTES
[facility]  Office Visit Progress Note    Laurie Rojas  [birth year] September 7, 2022    CC: \"I have some more SOB. \"     HPI:  The patient is 69 y.o. female with a past medical history significant for CAD, atrial fib, HTN, aortic aneurysm and COPD who is here for follow up. Admitted 1/7/2018-1/12/2018  With SOB and chest pain and dx with acute on chronic diastolic HF (diuresed), and atrial fib. Troponins elevated, testing pended due to sepsis from the flu. Outpatient stress testing positive and she underwent cardiac cath on 3/2/18 which revealed  of the RCA and nonobstructive CAD of the LAD and LCX. She also underwent endovascular AAA repair on 3/21/18 by Dr. Rodolfo Solis. Patient was  in the hospital and had 2/25/20 for atrial fibrillation along with signs and symptoms of heart failure. She converted to sinus rhythm after she was started on amiodarone therapy. Continued management per Dr. Frannie Pate. S/p PVI ablation 10/2020, she then had Afib ablation last year but had recurrence of A fib She underwent DCCV 1/12/21,  repeat AFIB ablation 2/17/21. continue Xarelto,  Toprol-XL. Flecainide discontinued 5/18/21 with plans for 30 day Event monitor at his f/u with Antonia Draper CNP. Admitted 9-9-21 to 9-12-21 acute on chronic diastolic heart failure, pleural effusion and AFIB. DC weight 221#. She continues with off/on SOB per patient's report. Admitted 5/24-5/26/22 atrial fibrillation for dofetilide loading after having multiple ablations, cardioversions (last ablation 2/4/22 ,CV 4/2022) and failing flecainide therapy. She was started on 250mcg of dofetilide Q12H. Her QTc was monitored closely without any significant changes or need for dose adjustment. Electrolytes also monitored daily and replaced if needed. She did convert to sinus rhythm on 5/25/22 with improvement in symptoms. QTc remains acceptable today.  She was discharged on 250mcg of dofetilide Q12H with a BMP in 1 disease) ([facility] [address]     History of blood clots     Hyperlipidemia     Hypertension      Past Surgical History:   Procedure Laterality Date    ABDOMINAL AORTIC ANEURYSM REPAIR      Endovascular abdominal AA    APPENDECTOMY      incidental    BLADDER SUSPENSION      CARDIAC SURGERY      CATARACT REMOVAL      CHOLECYSTECTOMY  10/15/13    COLONOSCOPY  07    dr Valles Cutting and check in 5 years. COLONOSCOPY  2017    ok dr arndt, repeat 5 years    HYSTERECTOMY ([facility])      for benign tumor. just the uterus    JOINT REPLACEMENT  2013    right knee replacement    TONSILLECTOMY  as a child    TUMOR EXCISION      benign behind right ear about      Family History   Problem Relation Age of Onset    Heart Disease Father     Hypertension Other      Social History     Tobacco Use    Smoking status: Former     Packs/day: 1.00     Years: 37.00     Pack years: 37.00     Types: Cigarettes     Start date: 1976     Quit date: 2013     Years since quittin.9    Smokeless tobacco: Never    Tobacco comments:     E cigarette now and then   Vaping Use    Vaping Use: Some days    Substances: Nicotine   Substance Use Topics    Alcohol use: No    Drug use: No       Allergies   Allergen Reactions    Morphine Rash     rash     Current Outpatient Medications   Medication Sig Dispense Refill    lisinopril (PRINIVIL;ZESTRIL) 20 MG tablet TAKE ONE TABLET BY MOUTH DAILY 90 tablet 3    traZODone (DESYREL) 50 MG tablet Take 1 tablet by mouth nightly as needed for Sleep 24 tablet 0    SYMBICORT 160-4.5 MCG/ACT AERO Inhale 2 puffs into the lungs in the morning and 2 puffs before bedtime. 1 each 11    aspirin EC 81 MG EC tablet Take 1 tablet by mouth in the morning. 90 tablet 3    isosorbide mononitrate (IMDUR) 30 MG extended release tablet Take 1 tablet by mouth in the morning.  . 90 tablet 3    hydrALAZINE (APRESOLINE) 10 MG tablet Take 1 tablet by mouth every 8 hours 90 tablet 0    vitamin C (ASCORBIC ACID) 500 MG tablet 500 mg      metoprolol succinate (TOPROL XL) 25 MG extended release tablet TAKE ONE TABLET BY MOUTH DAILY 90 tablet 3    dofetilide (TIKOSYN) 250 MCG capsule Take 1 capsule by mouth every 12 hours 60 capsule 5    furosemide (LASIX) 40 MG tablet Take 1 tablet by mouth 2 times daily 180 tablet 3    albuterol sulfate  (90 Base) MCG/ACT inhaler INHALE TWO PUFFS BY MOUTH EVERY 4 HOURS AS NEEDED FOR WHEEZING OR FOR SHORTNESS OF BREATH 1 each 3    rosuvastatin (CRESTOR) 20 MG tablet Take 1 tablet by mouth daily 90 tablet 2    rivaroxaban (XARELTO) 20 MG TABS tablet TAKE ONE TABLET BY MOUTH DAILY WITH BREAKFAST 30 tablet 11    albuterol (PROVENTIL) (2.5 MG/3ML) 0.083% nebulizer solution Take 3 mLs by nebulization every 4 hours as needed for Wheezing 540 mL 2    nitroGLYCERIN (NITROSTAT) 0.4 MG SL tablet Place 1 tablet under the tongue every 5 minutes as needed for Chest pain 25 tablet 1    Multiple Vitamins-Minerals (MULTI FOR HER 50+ PO) Take 1 tablet by mouth daily       No current facility-administered medications for this visit. Physical Exam:   /72   Pulse 74   Ht 5' 4\" (1.626 m)   Wt 226 lb (102.5 kg)   SpO2 97% Comment: per pt; pulse ox would not penetrate nail polish  BMI 38.79 kg/m²   Wt Readings from Last 2 Encounters:   09/07/22 226 lb (102.5 kg)   09/01/22 225 lb (102.1 kg)     Physical Exam:   Constitutional: The patient is oriented to person, place, and time. Appears well-developed and well-nourished. In no acute distress. Head: Normocephalic and atraumatic. Pupils equal and round. Neck: Neck supple. No JVP or carotid bruit appreciated. No mass and no thyromegaly present. No lymphadenopathy present. Cardiovascular: Normal rate and regular rhythm. Normal heart sounds. Exam reveals no gallop and no friction rub. No murmur heard. Pulmonary/Chest: Effort normal and breath sounds normal. No respiratory distress. - wheezes, no rhonchi, +scattered crackles.    Abdominal: included). [facility]: ([address]) 4/2017   of RCA chronic LAD 10-20% RA 4 PA24/9 16 wedge 9 LVEDP markedly elevated 30    Carotid US 10/2017 at [address]:  1. Estimated diameter reduction of the right internal carotid artery is  less than 50%. 2.  Estimated diameter reduction of the left internal carotid artery is  50-69%. 3.  The bilateral common carotid arteries reveal no evidence of   significant stenosis. 4.  There is greater than 50% stenosis of the right external carotid  artery. 5.  There is no evidence of significant stenosis in the left external  carotid artery. 6.  The bilateral vertebral arteries are patent with antegrade flow. 7.  The bilateral subclavian arteries reveal no evidence of significant  stenosis. 8.  There has been no significant change from the previous study of  4/21/2017. Lexiscan 2/19/18   Summary    Abnormal study. There is a moderate sized, mild intensity, partially    reversible defect of the mid to apical anterior and mid anterolateral walls    which is consistent with ischemia. There is breast attenuation but stress    images appear worse. Normal LV size and systolic function. Overall findings represent an intermediate risk study     Cardiac Cath 3/5/18  OVERALL IMPRESSION  1. Again, 100% proximal right coronary artery occlusion with left to right  collaterals. 2.  Mild disease of the distal left main trunk. 3.  20% to 30% ostial left anterior descending artery stenosis with  collaterals from LAD to the right coronary artery. 4.  30% to 40% stenosis of the proximal circumflex artery. 5.  Normal left ventricular systolic function with estimated EF of 55% to  60%. 6.  Slightly enlarged aortic root with no evidence of aortic stenosis or  regurgitation. In view of the above findings, we will okay the patient for her upcoming  aortic aneurysm surgery from cardiac standpoint. ECHO 1/2/20  There is moderate concentric left ventricular hypertrophy.   Patient appears to be possible R radial approach/intervention. No iodine allergy. -hold Xarelto 2 days prior, hold lasix morning of.   -I had a detail discussion with the patient and the family members regarding the risk and benefit of the procedures. I explained to them the details of the procedure. I explained to them that the procedure will be performed using moderate sedation and local anaesthesia using lidocaine. I explained any risk of bleeding, perforation of the vessel, stroke, myocardial infarction or death. The patient and the family members understood the risk and benefits and the details of procedure and would like to go ahead with the procedure. The patient gave informed consent. Chronic diastolic heart failure  -admitted x2 last 1 month to [facility]  -referred for consideration candidacy for CardioMEMS HF implant  -needs out patient ischemic workup  -today, she continues with exertional LI, no PND, orthopnea, rest.   -lasix 40 mg daily, Lisinopril 20 mg daily, Toprol-XL. -No aldactone secondary to elevated Cr.   -BMP8/1/22 abnormal but stable at this time.   -Encouraged medication compliance, low salt diet with hx of indiscretions, compression stockings and elevating legs.   -9/6/22 abnormal  Lexiscan myoview. -we will need to proceed with L/RHC possible R radial approach/intervention. No iodine allergy.   -CardioMEMS HF Implant PA submitted -they are both agreeable and ready to proceed after ischemic and heart failure workup. I had a detail discussion with the patient and the family members regarding the risk and benefit of the procedures. I explained to them the details of the procedure. I explained to them that the procedure will be performed using moderate sedation and local anaesthesia using lidocaine. I explained any risk of bleeding, perforation of the vessel, stroke, myocardial infarction or death.  The patient and the family members understood the risk and benefits and the details of procedure and would like to go ahead with the procedure. The patient gave informed consent. Patient seen in consultation today for CardioMEMS. Referred by: myself   Screening process completed. NYHA Class III and has had at least 1 hospitalization for HF in the last 12 mos. Patient is able to take dual antiplatelets or anticoagulants for 1 month post implant. Initial education provided: \"Better Heart failure Management from the [facility] Your [facility]\" brochure, overview video and automatic text messaging. Discussed risks and benefits of the procedure, recovery and requirements for monitoring post implant. Risks are rare but include arrhythmias, bleeding, death, device embolization, hematoma, infection, myocardial infarction, stroke, thrombus. Patient agreeable to proceed. Will initiate prior authorization process and set implant date when approved. Essential hypertension  -Blood pressure is stable today on medical therapy. Encouraged on low salt diet. Persistent atrial fibrillation   -CHADS VASC score 7 for age, gender, DVT, CHF, HTN, CAD.   -Managed per Dr. Erik Ya. S/p PVI ablation 10/2020, she then had Afib ablation last year but had recurrence of A fib She underwent DCCV 1/12/21,  repeat AFIB ablation 2/17/21, DCCV 4/2022.   -5/24/22 admitted for dofetilide loading after having multiple ablations, cardioversions and failing flecainide therapy. She was started on 250mcg of dofetilide Q12H. Her QTc was monitored closely without any significant changes or need for dose adjustment. Electrolytes also monitored daily and replaced if needed. She did convert to sinus rhythm on 5/25/22 with improvement in symptoms. QTc remains acceptable today. She will be discharged on 250mcg of dofetilide Q12H with a BMP in 1 week.   -presents today with worsening SOB  -EKG yesterday during stress study AFLUTTER controlled, ~92 bpm.   -physical exam regular rate/rhythm.   -Discussed with Dr. Monique Neville.   Will address further treatment after she undergoes invasive cardiac work-up. Severe COPD/Asthma managed per Dr. Antionette Yee. + crackles scattered and  no BLE edema on physical exam. Follow up 9/1/22 scheduled with Pulmonary f/u. AAA (abdominal aortic aneurysm) without rupture   -Underwent endovascular AAA repair on 3/21/18 by Dr. Cammie Gold. Follows vascular surgery    Sleep apnea  -Intolerant to CPAP. Instructed to obtain flu vaccine this season, annually and obtain COVID-19 vaccine per guidelines. We will plan to f/u post procedure          Thank you very much for allowing me to participate in the care of your patient. Please do not hesitate to contact me if you have any questions. Sincerely,  Jazmine Manzano MD      [facility], [address] [place] [address]  Ph: [phone]  Fax: [phone]      This note was scribed in the presence of Dr. Abiola Waller MD by Bartolome Soler RN.

## 2022-09-07 NOTE — PATIENT INSTRUCTIONS
LEFT AND RIGHT HEART CATHTETERIZATION WITH DR. Mattie Galicia AT [facility]  The morning of your procedure you will park in the hospital parking lot and report directly to the cath lab to check in.   [place] [facility] [address] [id number]    DATE: Thursday 9/15/22    TIME: 8:30 a.m. ARRIVAL TIME: 7:00 a.m. Pre-Procedure Instructions: You will need to fast for at least 8 hours prior to procedure. No caffeine the morning of. No gum or mints, if having general anesthesia. You will need to hold your anticoagulation, XARELTO for 2 days prior. Hold your diuretic, LASIX the morning of procedure. All other medications can be taken in the morning with sips of water INCLUDING ASPIRIN. You will need to take 325 mg aspirin the morning of. If you are currently taking 81 mg please take 4 tablets that morning. Do not use any lotions, creams or perfume the morning of procedure. Pre-procedure lab work will need to be completed 5-7 days prior to procedure. Please have a responsible adult to drive you home after procedure. We advise you have someone to stay with you for 24 hours following procedure for precautionary measures. Depending on procedure you may require an overnight stay. Cath lab will provide you with all post procedure instructions. If you have any questions regarding the procedure itself or medications, please call [phone] and ask to speak with a nurse.

## 2022-09-07 NOTE — TELEPHONE ENCOUNTER
LEFT AND RIGHT HEART CATHTETERIZATION WITH DR. Chung Edwards AT [facility]  The morning of your procedure you will park in the hospital parking lot and report directly to the cath lab to check in.   [address] [facility]. [id number]     DATE: Thursday 9/15/22     TIME: 8:30 a.m. ARRIVAL TIME: 7:00 a.m. Pre-Procedure Instructions: You will need to fast for at least 8 hours prior to procedure. No caffeine the morning of. No gum or mints, if having general anesthesia. You will need to hold your anticoagulation, XARELTO for 2 days prior. Hold your diuretic, LASIX the morning of procedure. All other medications can be taken in the morning with sips of water INCLUDING ASPIRIN. You will need to take 325 mg aspirin the morning of. If you are currently taking 81 mg please take 4 tablets that morning. Do not use any lotions, creams or perfume the morning of procedure. Pre-procedure lab work will need to be completed 5-7 days prior to procedure. Please have a responsible adult to drive you home after procedure. We advise you have someone to stay with you for 24 hours following procedure for precautionary measures. Depending on procedure you may require an overnight stay. Cath lab will provide you with all post procedure instructions. If you have any questions regarding the procedure itself or medications, please call [phone] and ask to speak with a nurse. Published on [employer] and e-mail to Tere Sandoval. Printed on front of AVS, highlighted, reviewed with patient and . Labs today. No further q/c at this time.

## 2022-09-08 LAB
EKG ATRIAL RATE: 277 BPM
EKG DIAGNOSIS: NORMAL
EKG P AXIS: 78 DEGREES
EKG Q-T INTERVAL: 348 MS
EKG QRS DURATION: 68 MS
EKG QTC CALCULATION (BAZETT): 430 MS
EKG R AXIS: -6 DEGREES
EKG T AXIS: 51 DEGREES
EKG VENTRICULAR RATE: 92 BPM

## 2022-09-10 ENCOUNTER — HOSPITAL ENCOUNTER (OUTPATIENT)
Dept: CT IMAGING | Age: 77
Discharge: HOME | End: 2022-09-10
Payer: MEDICARE

## 2022-09-10 DIAGNOSIS — Z87.891 PERSONAL HISTORY OF TOBACCO USE: ICD-10-CM

## 2022-09-10 PROCEDURE — 71271 CT THORAX LUNG CANCER SCR C-: CPT

## 2022-09-12 ENCOUNTER — TELEPHONE (OUTPATIENT)
Dept: PULMONOLOGY | Age: 77
End: 2022-09-12

## 2022-09-12 NOTE — TELEPHONE ENCOUNTER
Her CT looks good. There are no new findings and no signs of infections.   Small amount of fluid in the lungs but nothing different from the last CT

## 2022-09-12 NOTE — TELEPHONE ENCOUNTER
[phone] and spoke with Jewels Renteria to move her procedure based on coverage to Tuesday 9/20/22 at 0830, arrival 0700 with Dr. Francesco Harrington as back up. She is agreeable. Wrote down date/times, read back correctly. No pre procedure changes. Labs already completed. Published on [employer] and e-mail to [place].

## 2022-09-20 ENCOUNTER — HOSPITAL ENCOUNTER (OUTPATIENT)
Dept: CARDIAC CATH/INVASIVE PROCEDURES | Age: 77
Discharge: HOME | End: 2022-09-20
Payer: MEDICARE

## 2022-09-20 VITALS
TEMPERATURE: 97.2 F | HEART RATE: 74 BPM | RESPIRATION RATE: 20 BRPM | DIASTOLIC BLOOD PRESSURE: 60 MMHG | BODY MASS INDEX: 37.39 KG/M2 | HEIGHT: 64 IN | OXYGEN SATURATION: 100 % | SYSTOLIC BLOOD PRESSURE: 143 MMHG | WEIGHT: 219 LBS

## 2022-09-20 LAB
EKG ATRIAL RATE: 86 BPM
EKG DIAGNOSIS: NORMAL
EKG Q-T INTERVAL: 412 MS
EKG QRS DURATION: 76 MS
EKG QTC CALCULATION (BAZETT): 469 MS
EKG R AXIS: -23 DEGREES
EKG T AXIS: -27 DEGREES
EKG VENTRICULAR RATE: 78 BPM
POC ACT LR: 355 SEC

## 2022-09-20 PROCEDURE — 93454 CORONARY ARTERY ANGIO S&I: CPT | Performed by: STUDENT IN AN ORGANIZED HEALTH CARE EDUCATION/TRAINING PROGRAM

## 2022-09-20 PROCEDURE — 92978 ENDOLUMINL IVUS OCT C 1ST: CPT

## 2022-09-20 PROCEDURE — 99153 MOD SED SAME PHYS/QHP EA: CPT

## 2022-09-20 PROCEDURE — 99152 MOD SED SAME PHYS/QHP 5/>YRS: CPT

## 2022-09-20 PROCEDURE — 92978 ENDOLUMINL IVUS OCT C 1ST: CPT | Performed by: STUDENT IN AN ORGANIZED HEALTH CARE EDUCATION/TRAINING PROGRAM

## 2022-09-20 PROCEDURE — C1874 STENT, COATED/COV W/DEL SYS: HCPCS

## 2022-09-20 PROCEDURE — 93460 R&L HRT ART/VENTRICLE ANGIO: CPT

## 2022-09-20 PROCEDURE — C9600 PERC DRUG-EL COR STENT SING: HCPCS

## 2022-09-20 PROCEDURE — 6370000000 HC RX 637 (ALT 250 FOR IP)

## 2022-09-20 PROCEDURE — 6360000004 HC RX CONTRAST MEDICATION: Performed by: STUDENT IN AN ORGANIZED HEALTH CARE EDUCATION/TRAINING PROGRAM

## 2022-09-20 PROCEDURE — C1753 CATH, INTRAVAS ULTRASOUND: HCPCS

## 2022-09-20 PROCEDURE — 93460 R&L HRT ART/VENTRICLE ANGIO: CPT | Performed by: INTERNAL MEDICINE

## 2022-09-20 PROCEDURE — 93005 ELECTROCARDIOGRAM TRACING: CPT | Performed by: STUDENT IN AN ORGANIZED HEALTH CARE EDUCATION/TRAINING PROGRAM

## 2022-09-20 PROCEDURE — 85347 COAGULATION TIME ACTIVATED: CPT

## 2022-09-20 PROCEDURE — 99152 MOD SED SAME PHYS/QHP 5/>YRS: CPT | Performed by: STUDENT IN AN ORGANIZED HEALTH CARE EDUCATION/TRAINING PROGRAM

## 2022-09-20 PROCEDURE — C1725 CATH, TRANSLUMIN NON-LASER: HCPCS

## 2022-09-20 PROCEDURE — C1887 CATHETER, GUIDING: HCPCS

## 2022-09-20 PROCEDURE — C1751 CATH, INF, PER/CENT/MIDLINE: HCPCS

## 2022-09-20 PROCEDURE — 92928 PRQ TCAT PLMT NTRAC ST 1 LES: CPT | Performed by: STUDENT IN AN ORGANIZED HEALTH CARE EDUCATION/TRAINING PROGRAM

## 2022-09-20 PROCEDURE — C1894 INTRO/SHEATH, NON-LASER: HCPCS

## 2022-09-20 PROCEDURE — C1769 GUIDE WIRE: HCPCS

## 2022-09-20 PROCEDURE — 93010 ELECTROCARDIOGRAM REPORT: CPT | Performed by: INTERNAL MEDICINE

## 2022-09-20 PROCEDURE — 2500000003 HC RX 250 WO HCPCS

## 2022-09-20 PROCEDURE — 6360000002 HC RX W HCPCS

## 2022-09-20 PROCEDURE — C1760 CLOSURE DEV, VASC: HCPCS

## 2022-09-20 PROCEDURE — 2709999900 HC NON-CHARGEABLE SUPPLY

## 2022-09-20 RX ORDER — SODIUM CHLORIDE 0.9 % (FLUSH) 0.9 %
5-40 SYRINGE (ML) INJECTION PRN
Status: DISCONTINUED | OUTPATIENT
Start: 2022-09-20 | End: 2022-09-21 | Stop reason: HOSPADM

## 2022-09-20 RX ORDER — SODIUM CHLORIDE 9 MG/ML
INJECTION, SOLUTION INTRAVENOUS PRN
Status: DISCONTINUED | OUTPATIENT
Start: 2022-09-20 | End: 2022-09-21 | Stop reason: HOSPADM

## 2022-09-20 RX ORDER — ACETAMINOPHEN 325 MG/1
650 TABLET ORAL EVERY 4 HOURS PRN
Status: DISCONTINUED | OUTPATIENT
Start: 2022-09-20 | End: 2022-09-21 | Stop reason: HOSPADM

## 2022-09-20 RX ORDER — SODIUM CHLORIDE 0.9 % (FLUSH) 0.9 %
5-40 SYRINGE (ML) INJECTION EVERY 12 HOURS SCHEDULED
Status: DISCONTINUED | OUTPATIENT
Start: 2022-09-20 | End: 2022-09-21 | Stop reason: HOSPADM

## 2022-09-20 RX ORDER — CLOPIDOGREL BISULFATE 75 MG/1
75 TABLET ORAL DAILY
Status: DISCONTINUED | OUTPATIENT
Start: 2022-09-21 | End: 2022-09-21 | Stop reason: HOSPADM

## 2022-09-20 RX ORDER — ASPIRIN 81 MG/1
81 TABLET, CHEWABLE ORAL DAILY
Status: DISCONTINUED | OUTPATIENT
Start: 2022-09-21 | End: 2022-09-21 | Stop reason: HOSPADM

## 2022-09-20 RX ORDER — CLOPIDOGREL BISULFATE 75 MG/1
75 TABLET ORAL DAILY
Qty: 30 TABLET | Refills: 3 | Status: ON HOLD | OUTPATIENT
Start: 2022-09-20

## 2022-09-20 RX ADMIN — IOPAMIDOL 130 ML: 755 INJECTION, SOLUTION INTRAVENOUS at 09:49

## 2022-09-20 NOTE — PROCEDURES
[address] [place] [address]                            CARDIAC CATHETERIZATION    PATIENT NAME: Vimal Jack                     :        1945  MED REC NO:   [id number]                          ROOM:  ACCOUNT NO:   [de-identified]                           ADMIT DATE: 2022  PROVIDER:     Bren Gutierres MD      DATE OF PROCEDURE:  2022    LEFT AND RIGHT HEART STUDY NOTE    PROCEDURE PERFORMED:  Left and right heart study. PROCEDURE NOTE:  The patient was explained benefits and risks of the  procedure. Informed consent was obtained. The patient's existing  antecubital vein was used for a right heart cath. It was exchanged over  a guidewire to a 7-Greek sheath. Pressure in right atrium, right  ventricle, pulmonary artery and pulmonary capillary wedge positions were  obtained and recorded. Oxygen saturation was obtained in RA, RV, and PA  positions. Pulmonary capillary wedge pressure was obtained and  recorded. The cardiac output and indices then were subsequently  obtained. The Berkeley-Doris catheter was then withdrawn. The attempt to  cannulate the right radial artery were unsuccessful. It was therefore  decided to proceed with the procedure from the right groin. After  giving local lidocaine, right groin was accessed and a 6-Greek sheath  was placed in the right groin. The coronary angiography was carried out  with the help of 5-Greek JL4 and a 5-Greek JR4 catheters. Multiple  views of the coronary arteries in the NAHID and SILVA projection with  cranial and caudal angulations were obtained.   Angled pigtail used for a  left ventriculogram.  Pressure in the left ventricular was obtained  before and after left ventriculogram.  The patient's angiography was  carefully reviewed with the interventionist Dr. Eric Bustillo and it was felt  that the patient would benefit from percutaneous intervention of the  circumflex artery, which will be separately dictated by Dr. Eric Bustillo. SEDATION NOTE:  The patient received 3 mg of Versed and 150 mcg of  fentanyl during the procedure. Additional sedation for PCI will be  dictated by Dr. Eric Bustillo. HEMODYNAMIC DATA:  Hemodynamic data are as follows:  Right atrial mean  pressure was 4 mmHg, oxygen saturation 68%. RV was 37/-2 with mean of  5, oxygen saturation 67%. PA was 42/5 with the oxygen saturation 69%. Pulmonary capillary wedge pressure, mean was 16. There was a question  of a large V-waves. Cardiac output was 6.55 liters per minute, cardiac  index was 3.22 liters per minute per body surface area. The patient's  angiography was reviewed as mentioned and will undergo PCI, which will  be dictated separately by Dr. Eric Bustillo. Blood loss so far is less than 30 mL.         Aixa Keita MD    D: 09/20/2022 9:37:53       T: 09/20/2022 10:28:52     [id number]  Job#: [id number]     Doc#: [id number]    CC:  Bren Gutierres MD _____

## 2022-09-20 NOTE — PROCEDURES
Cardiac Catheterization Operative Report    Nena Portillo  [id number]  9/20/2022  Jett Brice MD    Patient has a diagnosis of abnormal stress test. She was referred for cardiac catheterization to evaluate coronary anatomy, intracardiac filling pressures and LV function    Procedure Details  The risks, benefits, complications, treatment options, and expected outcomes were discussed with the patient. The patient and/or family concurred with the proposed plan, giving informed consent. Patient was brought to the cath lab after IV hydration was begun and oral premedication was given. She was further sedated with fentanyl and versed. She was prepped and draped in the usual manner. Using the modified Seldinger access technique, a 6 [place] sheath was placed in the Right Femoral artery, and a 7 [place] sheath was placed in the R brachial veins. Heparin was administered. A right and left heart catheterization was done. Angiograms were also done. Sedation:   Start time 0832  End time 0950  4mg Versed / 150mcg Fentanyl  A trained independent observer was present to assist with monitoring sedated patient    Interventions:  PCI performed to the proximal Lcx with DESx2  Resolute [place] 3.5x22 mm  Resolute [place] 3. 5x8 mm  Excellent post angiographic result with BALWINDER 3 flow    After the procedure was completed. sedation was stopped and the sheaths and catheters were all removed. Hemostasis was achieved with manual pressure. Findings:    Left Main  Distal non obstructive 30% disease, evaluated by IVUS MLA > 7.5 mm2   LAD  Moderate diffuse disease   Circ  Proximal vessel 75-80% stenosis correlating with positive functional study   Mid vessel non-obstructive disease 50%   RCA   with L-R collaterals       Interventions/Vessels  PCI performed to the proximal Lcx with DESx2   Resolute [place] 3.5x22 mm   Resolute [place] 3. 5x8 mm   Excellent post angiographic result with BALWINDER 3 flow   Guides/Wires  XB 3.5 guide catheter   [place]

## 2022-09-20 NOTE — PRE SEDATION
Brief Pre-Op Note/Sedation Assessment      Mal Coad  1945  Room/bed info not found      [id number]  8:32 AM    Planned Procedure: Cardiac Catheterization Procedure    Post Procedure Plan: Return to same level of care    Consent: I have discussed with the patient and/or the patient representative the indication, alternatives, and the possible risks and/or complications of the planned procedure and the anesthesia methods. The patient and/or patient representative appear to understand and agree to proceed. Chief Complaint: Dyspnea      Indications for Cath Procedure:  Suspected CAD and LV Dysfunction  Anginal Classification within 2 weeks:  No symptoms  NYHA Heart Failure Class within 2 weeks: Class III - Symptoms of HF on less-than-ordinary exertion, Newly Diagnosed? No, Heart Failure Type: Diastolic  Is Cath Lab Visit Valve-related?: No  Surgical Risk: Low  Functional Type: Unknown    Anti- Anginal Meds within 2 weeks:   Yes: Beta Blockers and Statin (Any)    Stress or Imaging Studies Performed:  Stress Test with SPECT Result: Positive:  anterior and lateral Risk/Extent of Ischemia:  Intermediate     Vital Signs:  BP (!) 143/60   Pulse 74   Temp 97.2 °F (36.2 °C) (Infrared)   Resp 20   Ht 5' 4\" (1.626 m)   Wt 219 lb (99.3 kg)   SpO2 100%   BMI 37.59 kg/m²     Allergies:   Allergies   Allergen Reactions    Morphine Rash     rash       Past Medical History:  Past Medical History:   Diagnosis Date    AAA (abdominal aortic aneurysm) ([facility])     pt states it is 4cm    AAA (abdominal aortic aneurysm) without rupture ([facility]) 2/10/2015    Atrial fibrillation ([facility])     CAD (coronary artery disease)     CHF (congestive heart failure) ([facility])     COPD (chronic obstructive pulmonary disease) ([facility])     History of blood clots     Hyperlipidemia     Hypertension          Surgical History:  Past Surgical History:   Procedure Laterality Date    ABDOMINAL AORTIC ANEURYSM REPAIR      Endovascular abdominal AA APPENDECTOMY  1990    incidental    BLADDER SUSPENSION      CARDIAC SURGERY      CATARACT REMOVAL      CHOLECYSTECTOMY  10/15/13    COLONOSCOPY  9/2/07    dr Lee Jamul and check in 5 years. COLONOSCOPY  06/16/2017    ok dr arndt, repeat 5 years    HYSTERECTOMY ([facility])  1990    for benign tumor. just the uterus    JOINT REPLACEMENT  12/2013    right knee replacement    TONSILLECTOMY  as a child    TUMOR EXCISION      benign behind right ear about 2008         Medications:  Current Outpatient Medications   Medication Sig Dispense Refill    lisinopril (PRINIVIL;ZESTRIL) 20 MG tablet TAKE ONE TABLET BY MOUTH DAILY 90 tablet 3    traZODone (DESYREL) 50 MG tablet Take 1 tablet by mouth nightly as needed for Sleep 24 tablet 0    SYMBICORT 160-4.5 MCG/ACT AERO Inhale 2 puffs into the lungs in the morning and 2 puffs before bedtime. 1 each 11    aspirin EC 81 MG EC tablet Take 1 tablet by mouth in the morning. 90 tablet 3    isosorbide mononitrate (IMDUR) 30 MG extended release tablet Take 1 tablet by mouth in the morning.  . 90 tablet 3    hydrALAZINE (APRESOLINE) 10 MG tablet Take 1 tablet by mouth every 8 hours 90 tablet 0    vitamin C (ASCORBIC ACID) 500 MG tablet 500 mg      metoprolol succinate (TOPROL XL) 25 MG extended release tablet TAKE ONE TABLET BY MOUTH DAILY 90 tablet 3    dofetilide (TIKOSYN) 250 MCG capsule Take 1 capsule by mouth every 12 hours 60 capsule 5    furosemide (LASIX) 40 MG tablet Take 1 tablet by mouth 2 times daily 180 tablet 3    albuterol sulfate  (90 Base) MCG/ACT inhaler INHALE TWO PUFFS BY MOUTH EVERY 4 HOURS AS NEEDED FOR WHEEZING OR FOR SHORTNESS OF BREATH 1 each 3    rosuvastatin (CRESTOR) 20 MG tablet Take 1 tablet by mouth daily 90 tablet 2    rivaroxaban (XARELTO) 20 MG TABS tablet TAKE ONE TABLET BY MOUTH DAILY WITH BREAKFAST 30 tablet 11    albuterol (PROVENTIL) (2.5 MG/3ML) 0.083% nebulizer solution Take 3 mLs by nebulization every 4 hours as needed for Wheezing 540 mL 2    nitroGLYCERIN (NITROSTAT) 0.4 MG SL tablet Place 1 tablet under the tongue every 5 minutes as needed for Chest pain 25 tablet 1    Multiple Vitamins-Minerals (MULTI FOR HER 50+ PO) Take 1 tablet by mouth daily       Current Facility-Administered Medications   Medication Dose Route Frequency Provider Last Rate Last Admin    sodium chloride flush 0.9 % injection 5-40 mL  5-40 mL IntraVENous 2 times per day Jay Gibson MD        sodium chloride flush 0.9 % injection 5-40 mL  5-40 mL IntraVENous PRN Jay Gibson MD        0.9 % sodium chloride infusion   IntraVENous PRN Jay Gibson MD               Pre-Sedation:    Pre-Sedation Documentation and Exam:  I have personally completed a history, physical exam & review of systems for this patient (see notes). Prior History of Anesthesia Complications:   none    Modified Mallampati:  I (soft palate, uvula, fauces, tonsillar pillars visible)    ASA Classification:  Class 3 - A patient with severe systemic disease that limits activity but is not incapacitating      Mode Scale: Activity:  2 - Able to move 4 extremities voluntarily on command  Respiration:  2 - Able to breathe deeply and cough freely  Circulation:  2 - BP+/- 20mmHg of normal  Consciousness:  2 - Fully awake  Oxygen Saturation (color):  2 - Able to maintain oxygen saturation >92% on room air    Sedation/Anesthesia Plan:  Guard the patient's safety and welfare. Minimize physical discomfort and pain. Minimize negative psychological responses to treatment by providing sedation and analgesia and maximize the potential amnesia. Patient to meet pre-procedure discharge plan.     Medication Planned:  midazolam intravenously and fentanyl intravenously    Patient is an appropriate candidate for plan of sedation: yes      Electronically signed by Lucille Dakin, MD on 9/20/2022 at 8:32 AM

## 2022-09-20 NOTE — PROCEDURES
[address] [place] [address]                            CARDIAC CATHETERIZATION    PATIENT NAME: Horacio Heart                     :        1945  MED REC NO:   [id number]                          ROOM:  ACCOUNT NO:   [de-identified]                           ADMIT DATE: 2022  PROVIDER:     Arvin Dejesus MD    DATE OF PROCEDURE:  2022    ADDENDUM    CORONARY ANGIOGRAPHY:  1. Left main trunk: It arises from the left sinus of Valsalva. It  divides into LAD and circumflex artery. Left main trunk has mild  disease, but it is not hemodynamically significant. 2.  Left anterior descending artery: It arises from left main trunk. It is a tortuous artery and there appears to be a mild plaque of the  ostium. Remainder of the LAD appears free of atherosclerosis. 3.  Left circumflex artery:  The left circumflex artery is a large  artery and it has a 75% stenosis in the proximal segment. There is mild  disease in the obtuse marginal branch distally. There are collaterals  being supplied from the coronary system to the right coronary artery. 4.  RCA:  It arises from the right sinus of Valsalva. It is diffusely  diseased in the proximal and mid segment and it is completely blocked  after that. 5.  Left ventriculogram reveals a preserved left ventricular systolic  function with estimated EF of 55% to 60%. OVERALL IMPRESSION:  1. Mildly elevated right heart pressures. 2.  Normal cardiac output and indices. 3.  No oxygen step-up to suggest ASD or PFO. 4.  Patent left main trunk with mild disease. 5.  Tortuous LAD with mild osteal disease, not hemodynamically  significant. 6.  75% stenosis of the proximal circumflex artery with some mild  disease of the obtuse marginal branch, not hemodynamically significant. 7.  Left-to-right collaterals. 8.  100% occlusion of mid RCA.   9.  Normal left ventricular systolic

## 2022-09-20 NOTE — H&P
HPI:  The patient is 69 y.o. female with a past medical history significant for CAD, atrial fib, HTN, aortic aneurysm and COPD who is here for follow up. Admitted 1/7/2018-1/12/2018  With SOB and chest pain and dx with acute on chronic diastolic HF (diuresed), and atrial fib. Troponins elevated, testing pended due to sepsis from the flu. Outpatient stress testing positive and she underwent cardiac cath on 3/2/18 which revealed  of the RCA and nonobstructive CAD of the LAD and LCX. She also underwent endovascular AAA repair on 3/21/18 by Dr. Samantha Milian. Patient was  in the hospital and had 2/25/20 for atrial fibrillation along with signs and symptoms of heart failure. She converted to sinus rhythm after she was started on amiodarone therapy. Continued management per Dr. Misti Whitman. S/p PVI ablation 10/2020, she then had Afib ablation last year but had recurrence of A fib She underwent DCCV 1/12/21,  repeat AFIB ablation 2/17/21. continue Xarelto,  Toprol-XL. Flecainide discontinued 5/18/21 with plans for 30 day Event monitor at his f/u with Rao Wynn CNP. Admitted 9-9-21 to 9-12-21 acute on chronic diastolic heart failure, pleural effusion and AFIB. DC weight 221#. She continues with off/on SOB per patient's report. Admitted 5/24-5/26/22 atrial fibrillation for dofetilide loading after having multiple ablations, cardioversions (last ablation 2/4/22 ,CV 4/2022) and failing flecainide therapy. She was started on 250mcg of dofetilide Q12H. Her QTc was monitored closely without any significant changes or need for dose adjustment. Electrolytes also monitored daily and replaced if needed. She did convert to sinus rhythm on 5/25/22 with improvement in symptoms. QTc remains acceptable today. She was discharged on 250mcg of dofetilide Q12H with a BMP in 1 week. Presented to [facility] ED 7/8 with worsening SOB and admitted for acute on chronic diastolic heart failure.  Started on IV lasix and diuresed. Developed MARK. Diuresed 228 lbs. DC weight total 4L     Readmitted 7/26/22-8/1/22 worsening SOB and cough prior 3 days, weakness admitted COPD exacerbation/PNA/MARK/acute on chronic dCHF. Prsents today in hospital follow up and to discuss candidacy for CardioMEMS heart failure device implant and ischemic workup. 8/5/22 office visit, She is on 2L per NC since discharge from the hospital. She notes SOB but denies any other cardiac symptoms. Today, returns after completing a nuclear stress 9/6/22 abnormal for ischemia. She is accompanied by her  remaining on oxygen therapy with reported increase in SOB. Patient denies exertional chest pain/pressure, dyspnea at rest,  PND, orthopnea, palpitations, lightheadedness, weight changes, changes in LE edema, and syncope. Admits to medical therapy compliance and feels she is tolerating. Review of Systems:  Constitutional: Denies falls, night sweats or fever. Fatigue improved. HEENT: Denies new visual changes, ringing in ears, nosebleeds, nasal congestion  Respiratory: + SOBE on oxygen therapy. Cardiovascular: see HPI  GI: Denies N/V, diarrhea, constipation, abdominal pain, change in bowel habits, melena or hematochezia  : Denies urinary frequency, urgency, incontinence, hematuria or dysuria. Skin: Denies rash, hives, or cyanosis  Musculoskeletal: Denies joint or muscle aches/pain  Neurological: Denies syncope or TIA-like symptoms.   Psychiatric: Denies anxiety or depression, negative insomnia          Past Medical History:   Diagnosis Date    AAA (abdominal aortic aneurysm) ([facility] [address]       pt states it is 4cm    AAA (abdominal aortic aneurysm) without rupture (HCC) 2/10/2015    Atrial fibrillation (HCC)      CAD (coronary artery disease)      CHF (congestive heart failure) (HCC)      COPD (chronic obstructive pulmonary disease) ([address]      History of blood clots      Hyperlipidemia      Hypertension              Past Surgical History:   Procedure Laterality Date    ABDOMINAL AORTIC ANEURYSM REPAIR         Endovascular abdominal AA    APPENDECTOMY        incidental    BLADDER SUSPENSION        CARDIAC SURGERY        CATARACT REMOVAL        CHOLECYSTECTOMY   10/15/13    COLONOSCOPY   07     dr Addie Figueroa and check in 5 years. COLONOSCOPY   2017     ok dr arndt, repeat 5 years    HYSTERECTOMY ([facility])        for benign tumor. just the uterus    JOINT REPLACEMENT   2013     right knee replacement    TONSILLECTOMY   as a child    TUMOR EXCISION         benign behind right ear about             Family History   Problem Relation Age of Onset    Heart Disease Father      Hypertension Other        Social History            Tobacco Use    Smoking status: Former       Packs/day: 1.00       Years: 37.00       Pack years: 37.00       Types: Cigarettes       Start date: 1976       Quit date: 2013       Years since quittin.9    Smokeless tobacco: Never    Tobacco comments:       E cigarette now and then   Vaping Use    Vaping Use: Some days    Substances: Nicotine   Substance Use Topics    Alcohol use: No    Drug use: No               Allergies   Allergen Reactions    Morphine Rash       rash             Current Outpatient Medications   Medication Sig Dispense Refill    lisinopril (PRINIVIL;ZESTRIL) 20 MG tablet TAKE ONE TABLET BY MOUTH DAILY 90 tablet 3    traZODone (DESYREL) 50 MG tablet Take 1 tablet by mouth nightly as needed for Sleep 24 tablet 0    SYMBICORT 160-4.5 MCG/ACT AERO Inhale 2 puffs into the lungs in the morning and 2 puffs before bedtime. 1 each 11    aspirin EC 81 MG EC tablet Take 1 tablet by mouth in the morning. 90 tablet 3    isosorbide mononitrate (IMDUR) 30 MG extended release tablet Take 1 tablet by mouth in the morning.  . 90 tablet 3    hydrALAZINE (APRESOLINE) 10 MG tablet Take 1 tablet by mouth every 8 hours 90 tablet 0    vitamin C (ASCORBIC ACID) 500 MG tablet 500 mg        metoprolol succinate (TOPROL XL) 25 MG extended release tablet TAKE ONE TABLET BY MOUTH DAILY 90 tablet 3    dofetilide (TIKOSYN) 250 MCG capsule Take 1 capsule by mouth every 12 hours 60 capsule 5    furosemide (LASIX) 40 MG tablet Take 1 tablet by mouth 2 times daily 180 tablet 3    albuterol sulfate  (90 Base) MCG/ACT inhaler INHALE TWO PUFFS BY MOUTH EVERY 4 HOURS AS NEEDED FOR WHEEZING OR FOR SHORTNESS OF BREATH 1 each 3    rosuvastatin (CRESTOR) 20 MG tablet Take 1 tablet by mouth daily 90 tablet 2    rivaroxaban (XARELTO) 20 MG TABS tablet TAKE ONE TABLET BY MOUTH DAILY WITH BREAKFAST 30 tablet 11    albuterol (PROVENTIL) (2.5 MG/3ML) 0.083% nebulizer solution Take 3 mLs by nebulization every 4 hours as needed for Wheezing 540 mL 2    nitroGLYCERIN (NITROSTAT) 0.4 MG SL tablet Place 1 tablet under the tongue every 5 minutes as needed for Chest pain 25 tablet 1    Multiple Vitamins-Minerals (MULTI FOR HER 50+ PO) Take 1 tablet by mouth daily          No current facility-administered medications for this visit. Physical Exam:   /72   Pulse 74   Ht 5' 4\" (1.626 m)   Wt 226 lb (102.5 kg)   SpO2 97% Comment: per pt; pulse ox would not penetrate nail polish  BMI 38.79 kg/m²       Wt Readings from Last 2 Encounters:   09/07/22 226 lb (102.5 kg)   09/01/22 225 lb (102.1 kg)      Physical Exam:   Constitutional: The patient is oriented to person, place, and time. Appears well-developed and well-nourished. In no acute distress. Head: Normocephalic and atraumatic. Pupils equal and round. Neck: Neck supple. No JVP or carotid bruit appreciated. No mass and no thyromegaly present. No lymphadenopathy present. Cardiovascular: Normal rate and regular rhythm. Normal heart sounds. Exam reveals no gallop and no friction rub. No murmur heard. Pulmonary/Chest: Effort normal and breath sounds normal. No respiratory distress. - wheezes, no rhonchi, +scattered crackles. Abdominal: Soft, non-tender.  Bowel sounds mmHg included). [facility]: ([address]) 4/2017   of RCA chronic LAD 10-20% RA 4 PA24/9 16 wedge 9 LVEDP markedly elevated 30     Carotid US 10/2017 at [address]:  1. Estimated diameter reduction of the right internal carotid artery is  less than 50%. 2.  Estimated diameter reduction of the left internal carotid artery is  50-69%. 3.  The bilateral common carotid arteries reveal no evidence of   significant stenosis. 4.  There is greater than 50% stenosis of the right external carotid  artery. 5.  There is no evidence of significant stenosis in the left external  carotid artery. 6.  The bilateral vertebral arteries are patent with antegrade flow. 7.  The bilateral subclavian arteries reveal no evidence of significant  stenosis. 8.  There has been no significant change from the previous study of  4/21/2017. Lexiscan 2/19/18   Summary    Abnormal study. There is a moderate sized, mild intensity, partially    reversible defect of the mid to apical anterior and mid anterolateral walls    which is consistent with ischemia. There is breast attenuation but stress    images appear worse. Normal LV size and systolic function. Overall findings represent an intermediate risk study      Cardiac Cath 3/5/18  OVERALL IMPRESSION  1. Again, 100% proximal right coronary artery occlusion with left to right  collaterals. 2.  Mild disease of the distal left main trunk. 3.  20% to 30% ostial left anterior descending artery stenosis with  collaterals from LAD to the right coronary artery. 4.  30% to 40% stenosis of the proximal circumflex artery. 5.  Normal left ventricular systolic function with estimated EF of 55% to  60%. 6.  Slightly enlarged aortic root with no evidence of aortic stenosis or  regurgitation. In view of the above findings, we will okay the patient for her upcoming  aortic aneurysm surgery from cardiac standpoint. ECHO 1/2/20  There is moderate concentric left ventricular hypertrophy.   Patient appears to be in atrial fibrillation. Ejection fraction is estimated to be 50-60%. Indeterminate diastolic function. Mild aortic regurgitation. Mild tricuspid regurgitation. Mild mitral regurgitation with mitral annular calcification     Right Heart Cath 2/28/2020  1.  _____ normal right cardiac pressure. 2.  Elevated pulmonary capillary wedge pressure with a mean pulmonary  capillary wedge of 29 mmHg. 3.  Normal cardiac output and indices. 4.  No oxygen step off to suggest ASD/PFO. In view of the above findings, we will start the patient on a small dose  of diuretic therapy and see whether we need to adjust some of her  antihypertensive medicines or not. Her findings are consistent with a  diastolic heart failure. Stress test: 9/6/22       Abnormal myocardial perfusion study    Moderate sized moderate intensity mid to distal anterior, chad-lateral    reversible defect suggestive of ischemia    Reversible mid to base inferior wall defect    Possible suggestion of 2 V region of ischemia    Overall findings represent a intermediate-high risk study. Suggest [facility]            Assessment/Plan:     Coronary artery disease involving native coronary artery of native heart without angina pectoris  -She denies any signs or symptoms of angina today but continues with  LI, chronic. See above testing.   -Continue medical management with BB, ACEI, statin, Imdur, hydralainze and PRN SL Nitro.   -She previously reported myalgias with Crestor with resolution of cramping.   -Lipid profile 9/10/21 wnl. Will repeat with next blood draw  -will proceed with ischemic workup with Bipin Queen to r/o progressive CAD prior to [facility] HF implant. --today, returns after completing a nuclear stress 9/6/22 abnormal for ischemia.   -she notes increase in LI since our last visit despite oxygen therapy  -saw Pulmonary 9/1/22 for HFU.    -we discussed stress test findings at length with all questions and concerns answered. - L/RHC possible R radial approach/intervention. No iodine allergy. -hold Xarelto 2 days prior, hold lasix morning of.   -I had a detail discussion with the patient and the family members regarding the risk and benefit of the procedures. I explained to them the details of the procedure. I explained to them that the procedure will be performed using moderate sedation and local anaesthesia using lidocaine. I explained any risk of bleeding, perforation of the vessel, stroke, myocardial infarction or death. The patient and the family members understood the risk and benefits and the details of procedure and would like to go ahead with the procedure. The patient gave informed consent. Chronic diastolic heart failure  -admitted x2 last 1 month to [facility]  -referred for consideration candidacy for CardioMEMS HF implant  -needs out patient ischemic workup  -today, she continues with exertional LI, no PND, orthopnea, rest.   -lasix 40 mg daily, Lisinopril 20 mg daily, Toprol-XL. -No aldactone secondary to elevated Cr.   -BMP8/1/22 abnormal but stable at this time.   -Encouraged medication compliance, low salt diet with hx of indiscretions, compression stockings and elevating legs.   -9/6/22 abnormal  Lexiscan myoview. -we will need to proceed with L/RHC possible R radial approach/intervention. No iodine allergy.   -CardioMEMS HF Implant PA submitted -they are both agreeable and ready to proceed after ischemic and heart failure workup. I had a detail discussion with the patient and the family members regarding the risk and benefit of the procedures. I explained to them the details of the procedure. I explained to them that the procedure will be performed using moderate sedation and local anaesthesia using lidocaine. I explained any risk of bleeding, perforation of the vessel, stroke, myocardial infarction or death.  The patient and the family members understood the risk and benefits and the details of procedure and would like to go ahead with the procedure. The patient gave informed consent. Patient seen in consultation today for CardioMEMS. Referred by: myself   Screening process completed. NYHA Class III and has had at least 1 hospitalization for HF in the last 12 mos. Patient is able to take dual antiplatelets or anticoagulants for 1 month post implant. Initial education provided: \"Better Heart failure Management from the [facility] of Your Own [facility]\" brochure, overview video and automatic text messaging. Discussed risks and benefits of the procedure, recovery and requirements for monitoring post implant. Risks are rare but include arrhythmias, bleeding, death, device embolization, hematoma, infection, myocardial infarction, stroke, thrombus. Patient agreeable to proceed. Will initiate prior authorization process and set implant date when approved. Essential hypertension  -Blood pressure is stable today on medical therapy. Encouraged on low salt diet. Persistent atrial fibrillation   -CHADS VASC score 7 for age, gender, DVT, CHF, HTN, CAD.   -Managed per Dr. Roberto Bruner. S/p PVI ablation 10/2020, she then had Afib ablation last year but had recurrence of A fib She underwent DCCV 1/12/21,  repeat AFIB ablation 2/17/21, DCCV 4/2022.   -5/24/22 admitted for dofetilide loading after having multiple ablations, cardioversions and failing flecainide therapy. She was started on 250mcg of dofetilide Q12H. Her QTc was monitored closely without any significant changes or need for dose adjustment. Electrolytes also monitored daily and replaced if needed. She did convert to sinus rhythm on 5/25/22 with improvement in symptoms. QTc remains acceptable today. She will be discharged on 250mcg of dofetilide Q12H with a BMP in 1 week.   -presents today with worsening SOB  -EKG yesterday during stress study AFLUTTER controlled, ~92 bpm.   -physical exam regular rate/rhythm.   -Discussed with Dr. Sorin Malone.   Will address further treatment after she undergoes invasive cardiac work-up. Severe COPD/Asthma managed per Dr. Kofi Babin. + crackles scattered and  no BLE edema on physical exam. Follow up 9/1/22 scheduled with Pulmonary f/u. AAA (abdominal aortic aneurysm) without rupture   -Underwent endovascular AAA repair on 3/21/18 by Dr. Nicolas Gurrola. Follows vascular surgery     Sleep apnea  -Intolerant to CPAP. Instructed to obtain flu vaccine this season, annually and obtain COVID-19 vaccine per guidelines. We will plan to f/u post procedure             Thank you very much for allowing me to participate in the care of your patient. Please do not hesitate to contact me if you have any questions.      Sincerely,  Arvin Dejesus MD        [facility], [address] [place] [address]  Ph: [phone]  Fax: [phone]

## 2022-09-21 LAB — POC ACT LR: 253 SEC

## 2022-09-22 ENCOUNTER — TELEPHONE (OUTPATIENT)
Dept: CARDIOLOGY CLINIC | Age: 77
End: 2022-09-22

## 2022-09-22 RX ORDER — NITROGLYCERIN 0.4 MG/1
0.4 TABLET SUBLINGUAL EVERY 5 MIN PRN
Qty: 25 TABLET | Refills: 3 | Status: CANCELLED | OUTPATIENT
Start: 2022-09-22

## 2022-09-22 NOTE — TELEPHONE ENCOUNTER
Ms. Lionel Jurado called to request a refill of   nitroGLYCERIN (NITROSTAT) 0.4 MG SL tablet [[id number]]      Last Ov : 9/7/22   Last refill: 3/5/19 Dr. Ana Monzon     Please send to   [facility] [id number] [facility], [facility] [phone] Shakira Weaver [phone]   [address] [address]   Phone:  [phone]  Fax:  [phone]

## 2022-09-27 ENCOUNTER — TELEPHONE (OUTPATIENT)
Dept: CASE MANAGEMENT | Age: 77
End: 2022-09-27

## 2022-09-27 NOTE — TELEPHONE ENCOUNTER
CT Lung Cancer Screening completed on 9/10/2022, ordering provider, Dr Tj Valera, routed radiology results with recommendations. Smoking history reviewed.

## 2022-09-28 NOTE — PROGRESS NOTES
[facility]  Office Visit Progress Note    Orly Arrington  [birth year]    September 28, 2022    CC: \" I feel better since stents were placed. \"     HPI:  The patient is 69 y.o. female with a past medical history significant for CAD, atrial fib, HTN, aortic aneurysm and COPD who is here for follow up. Admitted 1/7/2018-1/12/2018  With SOB and chest pain and dx with acute on chronic diastolic HF (diuresed), and atrial fib. Troponins elevated, testing pended due to sepsis from the flu. Outpatient stress testing positive and she underwent cardiac cath on 3/2/18 which revealed  of the RCA and nonobstructive CAD of the LAD and LCX. She also underwent endovascular AAA repair on 3/21/18 by Dr. Yesenia Chavez. Patient was  in the hospital and had 2/25/20 for atrial fibrillation along with signs and symptoms of heart failure. She converted to sinus rhythm after she was started on amiodarone therapy. Continued management per Dr. Erik Ya. S/p PVI ablation 10/2020, she then had Afib ablation last year but had recurrence of A fib She underwent DCCV 1/12/21,  repeat AFIB ablation 2/17/21. continue Xarelto,  Toprol-XL. Flecainide discontinued 5/18/21 with plans for 30 day Event monitor at his f/u with Loren King CNP. Admitted 9-9-21 to 9-12-21 acute on chronic diastolic heart failure, pleural effusion and AFIB. DC weight 221#. She continues with off/on SOB per patient's report. Admitted 5/24-5/26/22 atrial fibrillation for dofetilide loading after having multiple ablations, cardioversions (last ablation 2/4/22 ,CV 4/2022) and failing flecainide therapy. She was started on 250mcg of dofetilide Q12H. Her QTc was monitored closely without any significant changes or need for dose adjustment. Electrolytes also monitored daily and replaced if needed. She did convert to sinus rhythm on 5/25/22 with improvement in symptoms. QTc remains acceptable today.  She was discharged on 250mcg of dofetilide Q12H with a BMP in 1 week. Presented to [facility] ED 7/8 with worsening SOB and admitted for acute on chronic diastolic heart failure. Started on IV lasix and diuresed. Developed MARK. Diuresed 228 lbs. DC weight total 4L    Readmitted 7/26/22-8/1/22 worsening SOB and cough prior 3 days, weakness admitted COPD exacerbation/PNA/MARK/acute on chronic dCHF. Prsents today in hospital follow up and to discuss candidacy for CardioMEMS heart failure device implant and ischemic workup. 8/5/22 office visit, She is on 2L per NC since discharge from the hospital. She notes SOB but denies any other cardiac symptoms. Today, she returns in follow up post [facility] on 9/20/22. She presents in a wheelchair on oxygen therapy and accompanied by her . She states feeling better with improved shortness of breath since stent placement. However, she reports nose bleeds and diarrhea. She denies any changes in her diet. She has remained compliant with all medication. She denies any awareness of her heart racing or palpitations. Review of Systems:  Constitutional: Denies falls, night sweats or fever. Fatigue improved. HEENT: Denies new visual changes, ringing in ears, nosebleeds, nasal congestion  Respiratory: + SOBE on oxygen therapy. Cardiovascular: see HPI  GI: Denies N/V, diarrhea, constipation, abdominal pain, change in bowel habits, melena or hematochezia  : Denies urinary frequency, urgency, incontinence, hematuria or dysuria. Skin: Denies rash, hives, or cyanosis  Musculoskeletal: Denies joint or muscle aches/pain  Neurological: Denies syncope or TIA-like symptoms.   Psychiatric: Denies anxiety or depression, negative insomnia     Past Medical History:   Diagnosis Date    AAA (abdominal aortic aneurysm) ([facility] [address]     pt states it is 4cm    AAA (abdominal aortic aneurysm) without rupture (HCC) 2/10/2015    Atrial fibrillation (HCC)     CAD (coronary artery disease)     CHF (congestive heart failure) (HCC)     COPD (chronic obstructive pulmonary disease) (HCC)     History of blood clots     Hyperlipidemia     Hypertension      Past Surgical History:   Procedure Laterality Date    ABDOMINAL AORTIC ANEURYSM REPAIR      Endovascular abdominal AA    APPENDECTOMY      incidental    BLADDER SUSPENSION      CARDIAC SURGERY      CATARACT REMOVAL      CHOLECYSTECTOMY  10/15/13    COLONOSCOPY  07    dr Samuel Pierer and check in 5 years. COLONOSCOPY  2017    ok dr arndt, repeat 5 years    HYSTERECTOMY ([facility])      for benign tumor. just the uterus    JOINT REPLACEMENT  2013    right knee replacement    TONSILLECTOMY  as a child    TUMOR EXCISION      benign behind right ear about      Family History   Problem Relation Age of Onset    Heart Disease Father     Hypertension Other      Social History     Tobacco Use    Smoking status: Former     Packs/day: 1.00     Years: 37.00     Pack years: 37.00     Types: Cigarettes     Start date: 1976     Quit date: 2013     Years since quittin.0    Smokeless tobacco: Never    Tobacco comments:     E cigarette now and then   Vaping Use    Vaping Use: Some days    Substances: Nicotine   Substance Use Topics    Alcohol use: No    Drug use: No       Allergies   Allergen Reactions    Morphine Rash     rash     Current Outpatient Medications   Medication Sig Dispense Refill    clopidogrel (PLAVIX) 75 MG tablet Take 1 tablet by mouth daily 30 tablet 3    lisinopril (PRINIVIL;ZESTRIL) 20 MG tablet TAKE ONE TABLET BY MOUTH DAILY 90 tablet 3    traZODone (DESYREL) 50 MG tablet Take 1 tablet by mouth nightly as needed for Sleep 24 tablet 0    SYMBICORT 160-4.5 MCG/ACT AERO Inhale 2 puffs into the lungs in the morning and 2 puffs before bedtime. 1 each 11    aspirin EC 81 MG EC tablet Take 1 tablet by mouth in the morning. 90 tablet 3    isosorbide mononitrate (IMDUR) 30 MG extended release tablet Take 1 tablet by mouth in the morning.  . 90 tablet 3    hydrALAZINE (APRESOLINE) 10 MG tablet Take 1 tablet by mouth every 8 hours 90 tablet 0    vitamin C (ASCORBIC ACID) 500 MG tablet 500 mg      metoprolol succinate (TOPROL XL) 25 MG extended release tablet TAKE ONE TABLET BY MOUTH DAILY 90 tablet 3    dofetilide (TIKOSYN) 250 MCG capsule Take 1 capsule by mouth every 12 hours 60 capsule 5    furosemide (LASIX) 40 MG tablet Take 1 tablet by mouth 2 times daily 180 tablet 3    albuterol sulfate  (90 Base) MCG/ACT inhaler INHALE TWO PUFFS BY MOUTH EVERY 4 HOURS AS NEEDED FOR WHEEZING OR FOR SHORTNESS OF BREATH 1 each 3    rosuvastatin (CRESTOR) 20 MG tablet Take 1 tablet by mouth daily 90 tablet 2    rivaroxaban (XARELTO) 20 MG TABS tablet TAKE ONE TABLET BY MOUTH DAILY WITH BREAKFAST 30 tablet 11    albuterol (PROVENTIL) (2.5 MG/3ML) 0.083% nebulizer solution Take 3 mLs by nebulization every 4 hours as needed for Wheezing 540 mL 2    nitroGLYCERIN (NITROSTAT) 0.4 MG SL tablet Place 1 tablet under the tongue every 5 minutes as needed for Chest pain 25 tablet 1    Multiple Vitamins-Minerals (MULTI FOR HER 50+ PO) Take 1 tablet by mouth daily       No current facility-administered medications for this visit. Physical Exam:   /72   Pulse 90   Ht 5' 4\" (1.626 m)   Wt 219 lb (99.3 kg) Comment: per pt who states she can not stand on our scale  SpO2 100% Comment: on 4 liters per minute  BMI 37.59 kg/m²   Wt Readings from Last 2 Encounters:   09/20/22 219 lb (99.3 kg)   09/07/22 226 lb (102.5 kg)     Physical Exam:   Constitutional: The patient is oriented to person, place, and time. Appears well-developed and well-nourished. In no acute distress. Head: Normocephalic and atraumatic. Pupils equal and round. Neck: Neck supple. No JVP or carotid bruit appreciated. No mass and no thyromegaly present. No lymphadenopathy present. Cardiovascular: Normal rate and regular rhythm. Normal heart sounds. Exam reveals no gallop and no friction rub.  No murmur heard.  Pulmonary/Chest: Effort normal and breath sounds normal. No respiratory distress. - wheezes, no rhonchi, +scattered crackles. Abdominal: Soft, non-tender. Bowel sounds are normal. Exhibits no organomegaly, mass or bruit. Extremities: no BLE edema, L>R. No cyanosis or clubbing. Pulses are 2+ radial and carotid bilaterally. Neurological: No gross cranial nerve deficit. Coordination normal.   Skin: Skin is warm and dry. There is no rash or diaphoresis. Psychiatric: Patient has a normal mood and affect. Speech is normal and behavior is normal.     Lab Review:   Lab Results   Component Value Date/Time    TRIG 59 09/10/2021 07:22 AM    HDL 56 09/10/2021 07:22 AM    HDL 38 2011 03:35 PM    LDLCALC 39 09/10/2021 07:22 AM    LDLDIRECT 111 2012 11:32 AM    LABVLDL 12 09/10/2021 07:22 AM     Lab Results   Component Value Date/Time     2022 12:38 PM    K 4.9 2022 12:38 PM    K 3.9 2022 03:16 PM    CL 97 2022 12:38 PM    CO2 31 2022 12:38 PM    BUN 37 2022 12:38 PM    CREATININE 1.2 2022 12:38 PM    GLUCOSE 87 2022 12:38 PM    GLUCOSE 102 2016 01:03 PM    CALCIUM 9.4 2022 12:38 PM      Lab Results   Component Value Date    WBC 14.6 (H) 2022    HGB 10.4 (L) 2022    HCT 32.2 (L) 2022    MCV 88.8 2022     2022       EK2018: Sinus rhythm with old anterior infarct  18: Sinus Rhythm, PACs, Old anterior infarct. 19: Sinus Rhythm  20: Sinus  Rhythm  - occasional PAC, # PACs = 1, -Anteroseptal infarct -age undetermined.    -Nonspecific ST depression  -Nondiagnostic. 21: Sinus rhythm  -First degree A-V block , -Anteroseptal infarct -age undetermined. 22: SR   : controlled atrial flutter    Echo 2018:  Mild concentric left ventricular hypertrophy. Visually estimated ejection fraction of 65%. Mitral annular calcification. Mild left atrial dilation.   Mild aortic stenosis. (mean gradients 02GJTS)  The systolic pulmonary artery pressure (SPAP) estimated at 30 mmHg  (estimated RA pressure of 8 mmHg included). [facility] ([facility] 4/2017   of RCA chronic LAD 10-20% RA 4 PA24/9 16 wedge 9 LVEDP markedly elevated 30    Carotid US 10/2017 at [address]:  1. Estimated diameter reduction of the right internal carotid artery is  less than 50%. 2.  Estimated diameter reduction of the left internal carotid artery is  50-69%. 3.  The bilateral common carotid arteries reveal no evidence of   significant stenosis. 4.  There is greater than 50% stenosis of the right external carotid  artery. 5.  There is no evidence of significant stenosis in the left external  carotid artery. 6.  The bilateral vertebral arteries are patent with antegrade flow. 7.  The bilateral subclavian arteries reveal no evidence of significant  stenosis. 8.  There has been no significant change from the previous study of  4/21/2017. Lexiscan 2/19/18   Summary    Abnormal study. There is a moderate sized, mild intensity, partially    reversible defect of the mid to apical anterior and mid anterolateral walls    which is consistent with ischemia. There is breast attenuation but stress    images appear worse. Normal LV size and systolic function. Overall findings represent an intermediate risk study     Cardiac Cath 3/5/18  OVERALL IMPRESSION  1. Again, 100% proximal right coronary artery occlusion with left to right  collaterals. 2.  Mild disease of the distal left main trunk. 3.  20% to 30% ostial left anterior descending artery stenosis with  collaterals from LAD to the right coronary artery. 4.  30% to 40% stenosis of the proximal circumflex artery. 5.  Normal left ventricular systolic function with estimated EF of 55% to  60%. 6.  Slightly enlarged aortic root with no evidence of aortic stenosis or  regurgitation.      In view of the above findings, we will okay the patient for her upcoming  aortic aneurysm surgery from cardiac standpoint. ECHO 1/2/20  There is moderate concentric left ventricular hypertrophy. Patient appears to be in atrial fibrillation. Ejection fraction is estimated to be 50-60%. Indeterminate diastolic function. Mild aortic regurgitation. Mild tricuspid regurgitation. Mild mitral regurgitation with mitral annular calcification    Right Heart Cath 2/28/2020  1.  _____ normal right cardiac pressure. 2.  Elevated pulmonary capillary wedge pressure with a mean pulmonary  capillary wedge of 29 mmHg. 3.  Normal cardiac output and indices. 4.  No oxygen step off to suggest ASD/PFO. In view of the above findings, we will start the patient on a small dose  of diuretic therapy and see whether we need to adjust some of her  antihypertensive medicines or not. Her findings are consistent with a  diastolic heart failure. Stress test: 9/6/22      Abnormal myocardial perfusion study    Moderate sized moderate intensity mid to distal anterior, chad-lateral    reversible defect suggestive of ischemia    Reversible mid to base inferior wall defect    Possible suggestion of 2 V region of ischemia    Overall findings represent a intermediate-high risk study. Suggest [facility]     Cath: 9/20/22  OVERALL IMPRESSION:  1. Mildly elevated right heart pressures. 2.  Normal cardiac output and indices. 3.  No oxygen step-up to suggest ASD or PFO. 4.  Patent left main trunk with mild disease. 5.  Tortuous LAD with mild osteal disease, not hemodynamically  significant. 6.  75% stenosis of the proximal circumflex artery with some mild  disease of the obtuse marginal branch, not hemodynamically significant. 7.  Left-to-right collaterals. 8.  100% occlusion of mid RCA. 9.  Normal left ventricular systolic function with preserved EF of 65%  to 70%.      Cath: 9/20/22 [place]    Findings:  Left Main  Distal non obstructive 30% disease, evaluated by IVUS MLA > 7.5 mm2   LAD  Moderate diffuse disease Circ  Proximal vessel 75-80% stenosis correlating with positive functional study   Mid vessel non-obstructive disease 50%   RCA   with L-R collaterals         Interventions/Vessels  PCI performed to the proximal Lcx with DESx2   [place] 3.5x22 mm   Resolute [place] 3. 5x8 mm   Excellent post angiographic result with BALWINDER 3 flow   Guides/Wires  XB 3.5 guide catheter   Terumo RunThrough   Balanced Heavy Weight   Devices  Opticross boston IVUS   Post % Stenosis  0   Closure Device  Perclose R CFA   Complications  None      Conclusion:   Successful PCI of proximal Lcx with DESx2  Plavix loaded in cath lab  Femoral artery perclosed with excellent hemostasis  3H bedrest  Home this evening, follow up in 1-2 weeks    Assessment/Plan:     Coronary artery disease involving native coronary artery of native heart without angina pectoris  -She denies any signs or symptoms of angina today but continues with  LI, chronic. See above testing.     -Continue medical management with BB, ACEI, statin, Imdur, hydralainze and PRN SL Nitro.   -She previously reported myalgias with Crestor with resolution of cramping.   -Lipid profile 9/10/21 wnl.  Will repeat with next blood draw  -will proceed with ischemic workup with [place] to r/o progressive CAD prior to CardioMEMS HF implant.     -Today(9/29/22), returns after cath on 9/20/22 s/p PCI of proximal Lcx with DESx2 by Dr. Yang Garcia  -denies any angina today  -reports continued and improved shortness of breath  -continues to wear 4L of oxygen   -remain on triple therapy; Plavix, aspirin, rosuvastatin  -will have her stop aspirin in 1 month (10/20/22) due to reports of epistaxis    Chronic diastolic heart failure  -admitted x2 last 1 month to [facility]  -referred for consideration candidacy for CardioMEMS HF implant  -needs out patient ischemic workup  -today, she continues with exertional LI, no PND, orthopnea, rest.   -lasix 40 mg daily, Lisinopril 20 mg daily, Toprol-XL. -No aldactone secondary to elevated Cr.   -BMP8/1/22 abnormal but stable at this time.   -Encouraged medication compliance, low salt diet with hx of indiscretions, compression stockings and elevating legs.   -9/6/22 abnormal  Lexiscan myoview. -  -CardioMEMS HF Implant PA submitted -they are both agreeable and ready to proceed after ischemic and heart failure workup. Will address CardioMEMS therapy with the patient during the next visit      -9/29/22; reviewed BMP, creat WNL at 1.2  -patient reports improved shortness of breath  -remains on 4L of oxygen therapy  -continue medical management  -renal panel and CBC today    Essential hypertension  -Blood pressure is stable today on medical therapy. Encouraged on low salt diet. Persistent atrial fibrillation   -CHADS VASC score 7 for age, gender, DVT, CHF, HTN, CAD.   -Managed per Dr. Frank Brambila. S/p PVI ablation 10/2020, she then had Afib ablation last year but had recurrence of A fib She underwent DCCV 1/12/21,  repeat AFIB ablation 2/17/21, DCCV 4/2022.   -5/24/22 admitted for dofetilide loading after having multiple ablations, cardioversions and failing flecainide therapy. She was started on 250mcg of dofetilide Q12H. Her QTc was monitored closely without any significant changes or need for dose adjustment. Electrolytes also monitored daily and replaced if needed. She did convert to sinus rhythm on 5/25/22 with improvement in symptoms. QTc remains acceptable today. She will be discharged on 250mcg of dofetilide Q12H with a BMP in 1 week.     -today's ECG appears to show sinus rhythm, will review with Dr. White Situ  -denies any awareness of her heart racing  -compliant with medication  -reports of epistaxis on triple therapy (Plavix, ASA and Xarelto). Will have her stop ASA in 1 month post stent placement and continue Plavix and Xarelto.   -keep f/u with EP NP, Nathanael Ozuna in December     Severe COPD/Asthma managed per Dr. Anita De Jesus.  + crackles scattered and  no BLE edema on physical exam. Keep f/u with pulmonology. AAA (abdominal aortic aneurysm) without rupture   -Underwent endovascular AAA repair on 3/21/18 by Dr. Mac Gruber. Follows vascular surgery    Sleep apnea  -Intolerant to CPAP. Epistaxis  -on triple therapy (ASA, Plavix and Xarelto) for CAD with recent stent placement and Afib.   -may stop aspirin on 10/20/22. Instructed to obtain flu vaccine this season, annually and obtain COVID-19 vaccine per guidelines. Return to the office in 4- 6 weeks    Thank you very much for allowing me to participate in the care of your patient. Please do not hesitate to contact me if you have any questions.     Sincerely,  Dion Musa MD      [place] [address]  Ph: [phone]  Fax: [phone]      This note was scribed in the presence of Dr. Dion Musa MD by Miranda Blanco RN

## 2022-09-29 ENCOUNTER — OFFICE VISIT (OUTPATIENT)
Dept: CARDIOLOGY CLINIC | Age: 77
End: 2022-09-29
Payer: MEDICARE

## 2022-09-29 VITALS
DIASTOLIC BLOOD PRESSURE: 72 MMHG | HEIGHT: 64 IN | SYSTOLIC BLOOD PRESSURE: 128 MMHG | OXYGEN SATURATION: 100 % | BODY MASS INDEX: 37.39 KG/M2 | WEIGHT: 219 LBS | HEART RATE: 90 BPM

## 2022-09-29 DIAGNOSIS — I50.32 CHRONIC DIASTOLIC HEART FAILURE (HCC): ICD-10-CM

## 2022-09-29 DIAGNOSIS — I48.19 PERSISTENT ATRIAL FIBRILLATION (HCC): ICD-10-CM

## 2022-09-29 DIAGNOSIS — R04.0 EPISTAXIS: ICD-10-CM

## 2022-09-29 DIAGNOSIS — G47.33 SLEEP APNEA, OBSTRUCTIVE: ICD-10-CM

## 2022-09-29 DIAGNOSIS — I10 ESSENTIAL HYPERTENSION: ICD-10-CM

## 2022-09-29 DIAGNOSIS — I71.40 AAA (ABDOMINAL AORTIC ANEURYSM) WITHOUT RUPTURE: ICD-10-CM

## 2022-09-29 DIAGNOSIS — I25.10 CAD IN NATIVE ARTERY: Primary | ICD-10-CM

## 2022-09-29 DIAGNOSIS — J44.9 COPD, SEVERE (HCC): ICD-10-CM

## 2022-09-29 LAB
ALBUMIN SERPL-MCNC: 4.3 G/DL (ref 3.4–5)
ANION GAP SERPL CALCULATED.3IONS-SCNC: 12 MMOL/L (ref 3–16)
BASOPHILS ABSOLUTE: 0.1 K/UL (ref 0–0.2)
BASOPHILS RELATIVE PERCENT: 0.8 %
BUN BLDV-MCNC: 19 MG/DL (ref 7–20)
CALCIUM SERPL-MCNC: 9.6 MG/DL (ref 8.3–10.6)
CHLORIDE BLD-SCNC: 97 MMOL/L (ref 99–110)
CO2: 34 MMOL/L (ref 21–32)
CREAT SERPL-MCNC: 1.4 MG/DL (ref 0.6–1.2)
EOSINOPHILS ABSOLUTE: 0.4 K/UL (ref 0–0.6)
EOSINOPHILS RELATIVE PERCENT: 5.5 %
GFR AFRICAN AMERICAN: 44
GFR NON-AFRICAN AMERICAN: 36
GLUCOSE BLD-MCNC: 100 MG/DL (ref 70–99)
HCT VFR BLD CALC: 33.9 % (ref 36–48)
HEMOGLOBIN: 11.1 G/DL (ref 12–16)
LYMPHOCYTES ABSOLUTE: 0.8 K/UL (ref 1–5.1)
LYMPHOCYTES RELATIVE PERCENT: 9.8 %
MCH RBC QN AUTO: 29.8 PG (ref 26–34)
MCHC RBC AUTO-ENTMCNC: 32.8 G/DL (ref 31–36)
MCV RBC AUTO: 90.7 FL (ref 80–100)
MONOCYTES ABSOLUTE: 0.5 K/UL (ref 0–1.3)
MONOCYTES RELATIVE PERCENT: 6.3 %
NEUTROPHILS ABSOLUTE: 6.3 K/UL (ref 1.7–7.7)
NEUTROPHILS RELATIVE PERCENT: 77.6 %
PDW BLD-RTO: 16.6 % (ref 12.4–15.4)
PHOSPHORUS: 4.3 MG/DL (ref 2.5–4.9)
PLATELET # BLD: 214 K/UL (ref 135–450)
PMV BLD AUTO: 9.2 FL (ref 5–10.5)
POTASSIUM SERPL-SCNC: 4.2 MMOL/L (ref 3.5–5.1)
RBC # BLD: 3.74 M/UL (ref 4–5.2)
SODIUM BLD-SCNC: 143 MMOL/L (ref 136–145)
WBC # BLD: 8.1 K/UL (ref 4–11)

## 2022-09-29 PROCEDURE — G8417 CALC BMI ABV UP PARAM F/U: HCPCS | Performed by: INTERNAL MEDICINE

## 2022-09-29 PROCEDURE — 93000 ELECTROCARDIOGRAM COMPLETE: CPT | Performed by: INTERNAL MEDICINE

## 2022-09-29 PROCEDURE — 99214 OFFICE O/P EST MOD 30 MIN: CPT | Performed by: INTERNAL MEDICINE

## 2022-09-29 PROCEDURE — G8399 PT W/DXA RESULTS DOCUMENT: HCPCS | Performed by: INTERNAL MEDICINE

## 2022-09-29 PROCEDURE — G8427 DOCREV CUR MEDS BY ELIG CLIN: HCPCS | Performed by: INTERNAL MEDICINE

## 2022-09-29 PROCEDURE — 3023F SPIROM DOC REV: CPT | Performed by: INTERNAL MEDICINE

## 2022-09-29 PROCEDURE — 1123F ACP DISCUSS/DSCN MKR DOCD: CPT | Performed by: INTERNAL MEDICINE

## 2022-09-29 PROCEDURE — 1036F TOBACCO NON-USER: CPT | Performed by: INTERNAL MEDICINE

## 2022-09-29 PROCEDURE — 1090F PRES/ABSN URINE INCON ASSESS: CPT | Performed by: INTERNAL MEDICINE

## 2022-09-29 RX ORDER — NITROGLYCERIN 0.4 MG/1
0.4 TABLET SUBLINGUAL EVERY 5 MIN PRN
Qty: 25 TABLET | Refills: 3 | Status: ON HOLD | OUTPATIENT
Start: 2022-09-29

## 2022-09-29 RX ORDER — RANOLAZINE 500 MG/1
TABLET, EXTENDED RELEASE ORAL
Status: ON HOLD | COMMUNITY
Start: 2022-09-23 | End: 2022-11-03

## 2022-09-30 ENCOUNTER — TELEPHONE (OUTPATIENT)
Dept: CARDIOLOGY CLINIC | Age: 77
End: 2022-09-30

## 2022-09-30 ENCOUNTER — TELEPHONE (OUTPATIENT)
Dept: FAMILY MEDICINE CLINIC | Age: 77
End: 2022-09-30

## 2022-09-30 ENCOUNTER — TELEPHONE (OUTPATIENT)
Dept: PULMONOLOGY | Age: 77
End: 2022-09-30

## 2022-09-30 RX ORDER — FLUCONAZOLE 150 MG/1
150 TABLET ORAL ONCE
Qty: 1 TABLET | Refills: 0 | Status: SHIPPED | OUTPATIENT
Start: 2022-09-30 | End: 2022-09-30

## 2022-09-30 NOTE — TELEPHONE ENCOUNTER
Michele Mccurdy called in this morning wanting to le Dr. Gino Gray know that she is still having nose bleeds, she said that Dr. Gino Gray told her to call the office if they continue.        You can reach Michele Mccurdy at #[phone] abdominal pain

## 2022-09-30 NOTE — TELEPHONE ENCOUNTER
Syd Sharma wants to know if she should discontinue her blood thinner because she's been put on Plavix by her heart doctor because she had 2 stents put in. She's been having a lot of bloody noses. Thank you.

## 2022-09-30 NOTE — TELEPHONE ENCOUNTER
Med sent    Orders Placed This Encounter   Medications    fluconazole (DIFLUCAN) 150 MG tablet     Sig: Take 1 tablet by mouth once for 1 dose     Dispense:  1 tablet     Refill:  0

## 2022-09-30 NOTE — TELEPHONE ENCOUNTER
----- Message from Daria Peace sent at 9/30/2022  1:35 PM EDT -----  Subject: Message to Provider    QUESTIONS  Information for Provider? Patient calling to ask for a prescription for a   yeast infection. Please call back with more information   ---------------------------------------------------------------------------  --------------  Hernandez DUMONT  [phone]; OK to leave message on voicemail  ---------------------------------------------------------------------------  --------------  SCRIPT ANSWERS  Relationship to Patient?  Self

## 2022-10-07 ENCOUNTER — TELEPHONE (OUTPATIENT)
Dept: FAMILY MEDICINE CLINIC | Age: 77
End: 2022-10-07

## 2022-10-07 RX ORDER — FLUCONAZOLE 150 MG/1
150 TABLET ORAL ONCE
Qty: 1 TABLET | Refills: 0 | Status: SHIPPED | OUTPATIENT
Start: 2022-10-07 | End: 2022-10-07

## 2022-10-07 NOTE — TELEPHONE ENCOUNTER
Pt has a yeast infection and would like to know if she could be prescribed that one pill for it.     [facility] in [place]     Pl advise  [phone] (home)

## 2022-10-10 ENCOUNTER — TELEPHONE (OUTPATIENT)
Dept: PULMONOLOGY | Age: 77
End: 2022-10-10

## 2022-10-10 NOTE — TELEPHONE ENCOUNTER
Patient is having an increased amount of SOB, she states she can't even get up and walk around. She calls in requesting to see Dr. Quintin Sultana before next scheduled appointment or next available. Dr. Quintin Sultana please advise as to where to schedule?     ('s have been advised recently to place a patient in need such as this on your schedule where we see fit , but I would like for you to advice on where and when to schedule)

## 2022-10-13 ENCOUNTER — OFFICE VISIT (OUTPATIENT)
Dept: PULMONOLOGY | Age: 77
End: 2022-10-13
Payer: MEDICARE

## 2022-10-13 VITALS
RESPIRATION RATE: 18 BRPM | BODY MASS INDEX: 38.93 KG/M2 | DIASTOLIC BLOOD PRESSURE: 86 MMHG | TEMPERATURE: 97.3 F | HEART RATE: 66 BPM | HEIGHT: 64 IN | WEIGHT: 228 LBS | SYSTOLIC BLOOD PRESSURE: 124 MMHG | OXYGEN SATURATION: 97 %

## 2022-10-13 DIAGNOSIS — R04.0 EPISTAXIS: ICD-10-CM

## 2022-10-13 DIAGNOSIS — J96.11 CHRONIC RESPIRATORY FAILURE WITH HYPOXIA (HCC): ICD-10-CM

## 2022-10-13 DIAGNOSIS — J44.9 COPD, SEVERE (HCC): Primary | ICD-10-CM

## 2022-10-13 LAB
A/G RATIO: 1.5 (ref 1.1–2.2)
ALBUMIN SERPL-MCNC: 3.8 G/DL (ref 3.4–5)
ALP BLD-CCNC: 72 U/L (ref 40–129)
ALT SERPL-CCNC: 10 U/L (ref 10–40)
ANION GAP SERPL CALCULATED.3IONS-SCNC: 13 MMOL/L (ref 3–16)
AST SERPL-CCNC: 14 U/L (ref 15–37)
BILIRUB SERPL-MCNC: 0.3 MG/DL (ref 0–1)
BUN BLDV-MCNC: 17 MG/DL (ref 7–20)
CALCIUM SERPL-MCNC: 9.6 MG/DL (ref 8.3–10.6)
CHLORIDE BLD-SCNC: 97 MMOL/L (ref 99–110)
CO2: 33 MMOL/L (ref 21–32)
CREAT SERPL-MCNC: 1.2 MG/DL (ref 0.6–1.2)
GFR AFRICAN AMERICAN: 53
GFR NON-AFRICAN AMERICAN: 44
GLUCOSE BLD-MCNC: 100 MG/DL (ref 70–99)
POTASSIUM SERPL-SCNC: 4.6 MMOL/L (ref 3.5–5.1)
SODIUM BLD-SCNC: 143 MMOL/L (ref 136–145)
TOTAL PROTEIN: 6.3 G/DL (ref 6.4–8.2)

## 2022-10-13 PROCEDURE — 99214 OFFICE O/P EST MOD 30 MIN: CPT | Performed by: INTERNAL MEDICINE

## 2022-10-13 PROCEDURE — G8417 CALC BMI ABV UP PARAM F/U: HCPCS | Performed by: INTERNAL MEDICINE

## 2022-10-13 PROCEDURE — G8399 PT W/DXA RESULTS DOCUMENT: HCPCS | Performed by: INTERNAL MEDICINE

## 2022-10-13 PROCEDURE — G8427 DOCREV CUR MEDS BY ELIG CLIN: HCPCS | Performed by: INTERNAL MEDICINE

## 2022-10-13 PROCEDURE — G8484 FLU IMMUNIZE NO ADMIN: HCPCS | Performed by: INTERNAL MEDICINE

## 2022-10-13 PROCEDURE — 1036F TOBACCO NON-USER: CPT | Performed by: INTERNAL MEDICINE

## 2022-10-13 PROCEDURE — 1123F ACP DISCUSS/DSCN MKR DOCD: CPT | Performed by: INTERNAL MEDICINE

## 2022-10-13 PROCEDURE — 1090F PRES/ABSN URINE INCON ASSESS: CPT | Performed by: INTERNAL MEDICINE

## 2022-10-13 PROCEDURE — 3023F SPIROM DOC REV: CPT | Performed by: INTERNAL MEDICINE

## 2022-10-13 NOTE — PATIENT INSTRUCTIONS
Try taking breathing treatments every 4 hours    Continue with symbicort and spiriva     Consider pulmonary rehab    Follow up in 3-4 months

## 2022-10-13 NOTE — PROGRESS NOTES
Chief complaint  This is a 69y.o. year old female  who comes to see me with a chief complaint of   Chief Complaint   Patient presents with    Follow-up    COPD       HPI  Here with CC of COPD    Called to be added on due to acute worsening of SOB. Had a similar issues last visit and she was given prednisone for her breathing. She said the prednisone did not help too much. Made her too wired or loopy. ON Symbicort, Spiriva and nebs. Taking nebs about 2 per day. Had a stent placed recently and it did not help her breathing that much. On plavix and xarelto and suffering from nose bleeds. When she takes her nebs she gets relief some of the time. Has not been engaging in much exercise. Does not feel she is retaining fluid. Not sick.  present    Current Outpatient Medications   Medication Sig Dispense Refill    ranolazine (RANEXA) 500 MG extended release tablet       nitroGLYCERIN (NITROSTAT) 0.4 MG SL tablet Place 1 tablet under the tongue every 5 minutes as needed for Chest pain 25 tablet 3    clopidogrel (PLAVIX) 75 MG tablet Take 1 tablet by mouth daily 30 tablet 3    lisinopril (PRINIVIL;ZESTRIL) 20 MG tablet TAKE ONE TABLET BY MOUTH DAILY 90 tablet 3    traZODone (DESYREL) 50 MG tablet Take 1 tablet by mouth nightly as needed for Sleep 24 tablet 0    SYMBICORT 160-4.5 MCG/ACT AERO Inhale 2 puffs into the lungs in the morning and 2 puffs before bedtime. 1 each 11    aspirin EC 81 MG EC tablet Take 1 tablet by mouth in the morning. 90 tablet 3    isosorbide mononitrate (IMDUR) 30 MG extended release tablet Take 1 tablet by mouth in the morning.  . 90 tablet 3    hydrALAZINE (APRESOLINE) 10 MG tablet Take 1 tablet by mouth every 8 hours 90 tablet 0    vitamin C (ASCORBIC ACID) 500 MG tablet 500 mg      metoprolol succinate (TOPROL XL) 25 MG extended release tablet TAKE ONE TABLET BY MOUTH DAILY 90 tablet 3    dofetilide (TIKOSYN) 250 MCG capsule Take 1 capsule by mouth every 12 hours 60 capsule 5 furosemide (LASIX) 40 MG tablet Take 1 tablet by mouth 2 times daily 180 tablet 3    albuterol sulfate  (90 Base) MCG/ACT inhaler INHALE TWO PUFFS BY MOUTH EVERY 4 HOURS AS NEEDED FOR WHEEZING OR FOR SHORTNESS OF BREATH 1 each 3    rosuvastatin (CRESTOR) 20 MG tablet Take 1 tablet by mouth daily 90 tablet 2    rivaroxaban (XARELTO) 20 MG TABS tablet TAKE ONE TABLET BY MOUTH DAILY WITH BREAKFAST 30 tablet 11    Multiple Vitamins-Minerals (MULTI FOR HER 50+ PO) Take 1 tablet by mouth daily       No current facility-administered medications for this visit. PHYSICAL EXAM:  Vitals:    10/13/22 1253   BP: 124/86   Pulse: 66   Resp: 18   Temp: 97.3 °F (36.3 °C)   SpO2: 97%         PE  GENERAL:  Well nourished, alert  HEENT:  No scleral icterus, no conjunctival irritation  NECK:  No thyromegaly, no bruits  LYMPH:  No cervical or supraclavicular adenopathy  HEART:  Regular rate and rhythm, no murmurs. Distant heart sounds   LUNGS:  Poor air movement, wheezing   ABDOMEN:  No distention, no organomegaly  EXTREMITIES: + edema, no digital clubbing  NEURO:  No localizing deficits, CN II-XII intact    Pulmonary Function Testing 7/2015  Severe obstruction with no response to bronchodilators  Moderate Restriction  Moderate Reduction in diffusing capacity     Chest imaging from 9/22 is reviewed. My impression is suspicion of emphysema, scarring and small nodules    6 MWT 9/2020  The patient ambulated 122 meters which is abnormal- 34-%   predicted. Her, heart rate response was normal, the blood   pressure response was normal, oxygen saturations were normal.     The patient desaturated to 93% while ambulating on room air. The degree of symptoms based on the Danielle Dyspnea/Fatigue scale   were increased with testing. This test does not indicate the   need for supplemental oxygen. I reviewed the above labs and images    ABG 6/22:  7.45/45/72     Assessment/Plan:  1.  COPD, severe ([address]  Not doing well and her breathing issues are likely multifactorial.  I recommend taking nebs every 4 hours and continue with spiriva and symbicort. Strongly consider pulmonary rehab but at minimum start exercising by walking every day    2. Chronic respiratory failure with hypoxia (HCC)  Uses up to 4 liters of oxygen with exertion and sleep. Does help. Intolerant to PAP therapy     I was hoping her breathing would have improved after stent placement.   IT does not appear that her breathing has improved that much    Pulmonary Rehab:  Strongly consider     Lung Cancer Screening CT:  9/2022    Has follow up in 3-4 months

## 2022-10-21 ENCOUNTER — TELEPHONE (OUTPATIENT)
Dept: PULMONOLOGY | Age: 77
End: 2022-10-21

## 2022-10-21 NOTE — TELEPHONE ENCOUNTER
Spoke with pt. Informed pt that the sample of Breo 200mg we had for her . No further questions or request at this time.

## 2022-11-02 ENCOUNTER — HOSPITAL ENCOUNTER (INPATIENT)
Age: 77
LOS: 6 days | Discharge: HOME HEALTH | DRG: 280 | End: 2022-11-08
Attending: EMERGENCY MEDICINE | Admitting: STUDENT IN AN ORGANIZED HEALTH CARE EDUCATION/TRAINING PROGRAM
Payer: MEDICARE

## 2022-11-02 ENCOUNTER — APPOINTMENT (OUTPATIENT)
Dept: GENERAL RADIOLOGY | Age: 77
DRG: 280 | End: 2022-11-02
Payer: MEDICARE

## 2022-11-02 DIAGNOSIS — R77.8 ELEVATED TROPONIN: ICD-10-CM

## 2022-11-02 DIAGNOSIS — R06.09 DOE (DYSPNEA ON EXERTION): Primary | ICD-10-CM

## 2022-11-02 PROBLEM — J96.21 ACUTE ON CHRONIC RESPIRATORY FAILURE WITH HYPOXIA (HCC): Status: ACTIVE | Noted: 2022-11-02

## 2022-11-02 LAB
A/G RATIO: 1.6 (ref 1.1–2.2)
ALBUMIN SERPL-MCNC: 3.9 G/DL (ref 3.4–5)
ALP BLD-CCNC: 64 U/L (ref 40–129)
ALT SERPL-CCNC: 9 U/L (ref 10–40)
ANION GAP SERPL CALCULATED.3IONS-SCNC: 7 MMOL/L (ref 3–16)
AST SERPL-CCNC: 15 U/L (ref 15–37)
BACTERIA WET PREP: ABNORMAL
BACTERIA: ABNORMAL /HPF
BASOPHILS ABSOLUTE: 0 K/UL (ref 0–0.2)
BASOPHILS RELATIVE PERCENT: 0.4 %
BILIRUB SERPL-MCNC: 0.3 MG/DL (ref 0–1)
BILIRUBIN URINE: NEGATIVE
BLOOD, URINE: NEGATIVE
BUN BLDV-MCNC: 21 MG/DL (ref 7–20)
CALCIUM SERPL-MCNC: 9.4 MG/DL (ref 8.3–10.6)
CHLORIDE BLD-SCNC: 94 MMOL/L (ref 99–110)
CLARITY: CLEAR
CLUE CELLS: ABNORMAL
CO2: 39 MMOL/L (ref 21–32)
COLOR: YELLOW
CREAT SERPL-MCNC: 1.2 MG/DL (ref 0.6–1.2)
EOSINOPHILS ABSOLUTE: 0.4 K/UL (ref 0–0.6)
EOSINOPHILS RELATIVE PERCENT: 3.6 %
EPITHELIAL CELLS WET PREP: ABNORMAL
EPITHELIAL CELLS, UA: 2 /HPF (ref 0–5)
GFR SERPL CREATININE-BSD FRML MDRD: 47 ML/MIN/{1.73_M2}
GLUCOSE BLD-MCNC: 122 MG/DL (ref 70–99)
GLUCOSE URINE: NEGATIVE MG/DL
HCT VFR BLD CALC: 28.7 % (ref 36–48)
HEMOGLOBIN: 9 G/DL (ref 12–16)
HYALINE CASTS: 0 /LPF (ref 0–8)
KETONES, URINE: NEGATIVE MG/DL
LEUKOCYTE ESTERASE, URINE: ABNORMAL
LYMPHOCYTES ABSOLUTE: 1.1 K/UL (ref 1–5.1)
LYMPHOCYTES RELATIVE PERCENT: 10.3 %
MCH RBC QN AUTO: 28.1 PG (ref 26–34)
MCHC RBC AUTO-ENTMCNC: 31.3 G/DL (ref 31–36)
MCV RBC AUTO: 89.6 FL (ref 80–100)
MICROSCOPIC EXAMINATION: YES
MONOCYTES ABSOLUTE: 0.7 K/UL (ref 0–1.3)
MONOCYTES RELATIVE PERCENT: 6.6 %
NEUTROPHILS ABSOLUTE: 8.8 K/UL (ref 1.7–7.7)
NEUTROPHILS RELATIVE PERCENT: 79.1 %
NITRITE, URINE: NEGATIVE
PDW BLD-RTO: 15.9 % (ref 12.4–15.4)
PH UA: 7 (ref 5–8)
PLATELET # BLD: 184 K/UL (ref 135–450)
PMV BLD AUTO: 9.4 FL (ref 5–10.5)
POTASSIUM SERPL-SCNC: 4.2 MMOL/L (ref 3.5–5.1)
PRO-BNP: 3858 PG/ML (ref 0–449)
PROTEIN UA: ABNORMAL MG/DL
RAPID INFLUENZA  B AGN: NEGATIVE
RAPID INFLUENZA A AGN: NEGATIVE
RBC # BLD: 3.2 M/UL (ref 4–5.2)
RBC UA: 2 /HPF (ref 0–4)
RBC WET PREP: ABNORMAL
SARS-COV-2, NAAT: NOT DETECTED
SODIUM BLD-SCNC: 140 MMOL/L (ref 136–145)
SOURCE WET PREP: ABNORMAL
SPECIFIC GRAVITY UA: 1.01 (ref 1–1.03)
TOTAL PROTEIN: 6.3 G/DL (ref 6.4–8.2)
TRICHOMONAS PREP: ABNORMAL
TROPONIN: 0.04 NG/ML
URINE REFLEX TO CULTURE: ABNORMAL
URINE TYPE: ABNORMAL
UROBILINOGEN, URINE: 0.2 E.U./DL
WBC # BLD: 11.1 K/UL (ref 4–11)
WBC UA: 9 /HPF (ref 0–5)
WBC WET PREP: ABNORMAL
YEAST WET PREP: ABNORMAL

## 2022-11-02 PROCEDURE — 84484 ASSAY OF TROPONIN QUANT: CPT

## 2022-11-02 PROCEDURE — 71045 X-RAY EXAM CHEST 1 VIEW: CPT

## 2022-11-02 PROCEDURE — 87210 SMEAR WET MOUNT SALINE/INK: CPT

## 2022-11-02 PROCEDURE — 83880 ASSAY OF NATRIURETIC PEPTIDE: CPT

## 2022-11-02 PROCEDURE — 71046 X-RAY EXAM CHEST 2 VIEWS: CPT

## 2022-11-02 PROCEDURE — 6370000000 HC RX 637 (ALT 250 FOR IP): Performed by: PHYSICIAN ASSISTANT

## 2022-11-02 PROCEDURE — 93005 ELECTROCARDIOGRAM TRACING: CPT | Performed by: PHYSICIAN ASSISTANT

## 2022-11-02 PROCEDURE — 87635 SARS-COV-2 COVID-19 AMP PRB: CPT

## 2022-11-02 PROCEDURE — 99285 EMERGENCY DEPT VISIT HI MDM: CPT

## 2022-11-02 PROCEDURE — 85025 COMPLETE CBC W/AUTO DIFF WBC: CPT

## 2022-11-02 PROCEDURE — 2060000000 HC ICU INTERMEDIATE R&B

## 2022-11-02 PROCEDURE — 81001 URINALYSIS AUTO W/SCOPE: CPT

## 2022-11-02 PROCEDURE — 80053 COMPREHEN METABOLIC PANEL: CPT

## 2022-11-02 PROCEDURE — 87804 INFLUENZA ASSAY W/OPTIC: CPT

## 2022-11-02 RX ORDER — ASPIRIN 81 MG/1
324 TABLET, CHEWABLE ORAL ONCE
Status: COMPLETED | OUTPATIENT
Start: 2022-11-02 | End: 2022-11-02

## 2022-11-02 RX ORDER — PREDNISONE 20 MG/1
40 TABLET ORAL DAILY
Status: CANCELLED | OUTPATIENT
Start: 2022-11-03

## 2022-11-02 RX ADMIN — ASPIRIN 81 MG 324 MG: 81 TABLET ORAL at 23:14

## 2022-11-02 ASSESSMENT — ENCOUNTER SYMPTOMS
BACK PAIN: 0
COUGH: 1
ABDOMINAL PAIN: 0
VOMITING: 0
SHORTNESS OF BREATH: 1

## 2022-11-02 ASSESSMENT — PAIN - FUNCTIONAL ASSESSMENT: PAIN_FUNCTIONAL_ASSESSMENT: NONE - DENIES PAIN

## 2022-11-02 NOTE — ED PROVIDER NOTES
[address] [facility]      Pt Name: Shun Crump  MRN: [id number]  [place] 1945  Date of evaluation: 11/2/2022  Provider: GITA Nguyễn    This patient was seen and evaluated by the attending physician Dr. Betty Meza. CHIEF COMPLAINT       Chief Complaint   Patient presents with    Shortness of Breath       CRITICAL CARE TIME   I performed a total Critical Care time of 15 minutes, excluding separately reportable procedures. There was a high probability of clinically significant/life threatening deterioration in the patient's condition which required my urgent intervention. Not limited to multiple reexaminations, discussions with attending physician and consultants. HISTORY OF PRESENT ILLNESS  (Location/Symptom, Timing/Onset, Context/Setting, Quality, Duration, Modifying Factors, Severity.)   Shun Crump is a 69 y.o. female who presents to the emergency department via EMS for shortness of breath. She tells me is been going on for couple days worse today. Is coughing up some clear phlegm but denies weight gain swelling in the legs or extremities. No known sick contacts. Lives at home with her . EMS gave her a DuoNeb treatment and an albuterol treatment. They tell me she was hypoxic on her 4 L chronic oxygen at home. She feels improved after the treatment. She also complains of some vaginal itching. States she was recently treated for yeast infection. Denies burning with urination abdominal pain fevers or vomiting. Past medical history of coronary artery disease, DVT, COPD, A. fib. She is on Xarelto and Plavix. Nursing Notes were reviewed and I agree. REVIEW OF SYSTEMS    (2-9 systems for level 4, 10 or more for level 5)     Review of Systems   Constitutional:  Negative for fever. Respiratory:  Positive for cough and shortness of breath. Cardiovascular:  Negative for chest pain and leg swelling. Gastrointestinal:  Negative for abdominal pain and vomiting. Musculoskeletal:  Negative for back pain. Neurological:  Negative for weakness and numbness. Psychiatric/Behavioral:  Negative for agitation, behavioral problems and confusion. Except as noted above the remainder of the review of systems was reviewed and negative. PAST MEDICAL HISTORY         Diagnosis Date    AAA (abdominal aortic aneurysm)     pt states it is 4cm    AAA (abdominal aortic aneurysm) without rupture 2/10/2015    Atrial fibrillation (HCC)     CAD (coronary artery disease)     CHF (congestive heart failure) (HCC)     COPD (chronic obstructive pulmonary disease) ([facility]     History of blood clots     Hyperlipidemia     Hypertension        SURGICAL HISTORY           Procedure Laterality Date    ABDOMINAL AORTIC ANEURYSM REPAIR      Endovascular abdominal AA    APPENDECTOMY  1990    incidental    BLADDER SUSPENSION      CARDIAC SURGERY      CATARACT REMOVAL      CHOLECYSTECTOMY  10/15/13    COLONOSCOPY  9/2/07    dr Pavon [place] and check in 5 years. COLONOSCOPY  06/16/2017    ok dr arndt, repeat 5 years    HYSTERECTOMY ([facility])  1990    for benign tumor. just the uterus    JOINT REPLACEMENT  12/2013    right knee replacement    TONSILLECTOMY  as a child    TUMOR EXCISION      benign behind right ear about 2008       CURRENT MEDICATIONS       Previous Medications    ALBUTEROL SULFATE  (90 BASE) MCG/ACT INHALER    INHALE TWO PUFFS BY MOUTH EVERY 4 HOURS AS NEEDED FOR WHEEZING OR FOR SHORTNESS OF BREATH    ASPIRIN EC 81 MG EC TABLET    Take 1 tablet by mouth in the morning.     CLOPIDOGREL (PLAVIX) 75 MG TABLET    Take 1 tablet by mouth daily    DOFETILIDE (TIKOSYN) 250 MCG CAPSULE    Take 1 capsule by mouth every 12 hours    FUROSEMIDE (LASIX) 40 MG TABLET    Take 1 tablet by mouth 2 times daily    HYDRALAZINE (APRESOLINE) 10 MG TABLET    Take 1 tablet by mouth every 8 hours    ISOSORBIDE MONONITRATE (IMDUR) 30 MG EXTENDED RELEASE TABLET    Take 1 tablet by mouth in the morning. Jolene Camp LISINOPRIL (PRINIVIL;ZESTRIL) 20 MG TABLET    TAKE ONE TABLET BY MOUTH DAILY    METOPROLOL SUCCINATE (TOPROL XL) 25 MG EXTENDED RELEASE TABLET    TAKE ONE TABLET BY MOUTH DAILY    MULTIPLE VITAMINS-MINERALS (MULTI FOR HER 50+ PO)    Take 1 tablet by mouth daily    NITROGLYCERIN (NITROSTAT) 0.4 MG SL TABLET    Place 1 tablet under the tongue every 5 minutes as needed for Chest pain    RANOLAZINE (RANEXA) 500 MG EXTENDED RELEASE TABLET        RIVAROXABAN (XARELTO) 20 MG TABS TABLET    TAKE ONE TABLET BY MOUTH DAILY WITH BREAKFAST    ROSUVASTATIN (CRESTOR) 20 MG TABLET    Take 1 tablet by mouth daily    SYMBICORT 160-4.5 MCG/ACT AERO    Inhale 2 puffs into the lungs in the morning and 2 puffs before bedtime. TRAZODONE (DESYREL) 50 MG TABLET    Take 1 tablet by mouth nightly as needed for Sleep    VITAMIN C (ASCORBIC ACID) 500 MG TABLET    500 mg       ALLERGIES     Morphine    FAMILY HISTORY           Problem Relation Age of Onset    Heart Disease Father     Hypertension Other      Family Status   Relation Name Status    Mother      Father      Other  (Not Specified)        SOCIAL HISTORY      reports that she quit smoking about 9 years ago. Her smoking use included cigarettes. She started smoking about 45 years ago. She has a 37.00 pack-year smoking history. She has been exposed to tobacco smoke. She has never used smokeless tobacco. She reports that she does not drink alcohol and does not use drugs. PHYSICAL EXAM    (up to 7 for level 4, 8 or more for level 5)     ED Triage Vitals [22]   BP Temp Temp Source Heart Rate Resp SpO2 Height Weight   (!) 157/56 98.1 °F (36.7 °C) Oral 73 28 100 % 5' 4\" (1.626 m) 234 lb 9.1 oz (106.4 kg)       Physical Exam  Vitals and nursing note reviewed. Constitutional:       Appearance: She is well-developed. HENT:      Head: Normocephalic and atraumatic.    Eyes:      Pupils: Pupils are equal, round, and reactive to light. Cardiovascular:      Rate and Rhythm: Normal rate. Pulses: Normal pulses. Pulmonary:      Effort: Pulmonary effort is normal. No respiratory distress. Breath sounds: Wheezing present. Musculoskeletal:         General: Normal range of motion. Cervical back: Normal range of motion. Right lower leg: No edema. Left lower leg: No edema. Skin:     General: Skin is warm. Neurological:      General: No focal deficit present. Mental Status: She is alert and oriented to person, place, and time. Psychiatric:         Mood and Affect: Mood normal.         Behavior: Behavior normal.       DIAGNOSTIC RESULTS     EKG: All EKG's are interpreted by GITA Narayan in the absence of a cardiologist.    EKG interpreted by myself - please refer to attending physician's note for complete EKG interpretation:    No evidence of acute ischemia or injury. RADIOLOGY:   Non-plain film images such as CT, Ultrasound and MRI are read by the radiologist. Plain radiographic images are visualized and preliminarily interpreted by GITA Narayan with the below findings:    Reviewed radiologist's interpretation. Interpretation per the Radiologist below, if available at the time of this note:    XR CHEST PORTABLE   Final Result   Mild pulmonary vascular congestion. Stable mild cardiomegaly.                LABS:  Labs Reviewed   WET PREP, GENITAL - Abnormal; Notable for the following components:       Result Value    Clue Cells, Wet Prep <1+ (*)     All other components within normal limits   URINALYSIS WITH REFLEX TO CULTURE - Abnormal; Notable for the following components:    Protein, UA TRACE (*)     Leukocyte Esterase, Urine SMALL (*)     All other components within normal limits   CBC WITH AUTO DIFFERENTIAL - Abnormal; Notable for the following components:    WBC 11.1 (*)     RBC 3.20 (*)     Hemoglobin 9.0 (*)     Hematocrit 28.7 (*)     RDW 15.9 (*)     Neutrophils Absolute 8.8 (*)     All other components within normal limits   COMPREHENSIVE METABOLIC PANEL - Abnormal; Notable for the following components:    Chloride 94 (*)     CO2 39 (*)     Glucose 122 (*)     BUN 21 (*)     Est, Glom Filt Rate 47 (*)     Total Protein 6.3 (*)     ALT 9 (*)     All other components within normal limits   TROPONIN - Abnormal; Notable for the following components:    Troponin 0.04 (*)     All other components within normal limits   BRAIN NATRIURETIC PEPTIDE - Abnormal; Notable for the following components:    Pro-BNP 3,858 (*)     All other components within normal limits   MICROSCOPIC URINALYSIS - Abnormal; Notable for the following components:    WBC, UA 9 (*)     All other components within normal limits   COVID-19, RAPID   RAPID INFLUENZA A/B ANTIGENS       All other labs were within normal range or not returned as of this dictation. EMERGENCY DEPARTMENT COURSE and DIFFERENTIAL DIAGNOSIS/MDM:   Vitals:    Vitals:    11/02/22 1926   BP: (!) 157/56   Pulse: 73   Resp: 28   Temp: 98.1 °F (36.7 °C)   TempSrc: Oral   SpO2: 100%   Weight: 234 lb 9.1 oz (106.4 kg)   Height: 5' 4\" (1.626 m)     Patient is afebrile she has not hypoxic at rest on her home oxygen. She is nontachycardic. She is complaining of exertional shortness of breath some cough. Is been worse today. She felt like she had an some improvement with nebulizers and albuterol treatments and per EMS she had been hypoxic on her home oxygen prior to that. History of COPD. She does have an elevated troponin. Underwent cardiac catheterization about a month and a half ago with stent placement. Reports compliance with Plavix and Xarelto. Attending physician recommended admission and gave aspirin. Will consult hospitalist.    CONSULTS:  IP CONSULT TO HOSPITALIST    PROCEDURES:  Procedures      FINAL IMPRESSION      1. LI (dyspnea on exertion)    2.  Elevated troponin          DISPOSITION/PLAN   DISPOSITION Decision To Admit 11/02/2022 10:54:13 PM      PATIENT REFERRED TO:  No follow-up provider specified.     DISCHARGE MEDICATIONS:  New Prescriptions    No medications on file       (Please note that portions of this note were completed with a voice recognition program.  Efforts were made to edit the dictations but occasionally words are mis-transcribed.)    Jacobo Diaz [place]  11/02/22 7232

## 2022-11-03 ENCOUNTER — TELEPHONE (OUTPATIENT)
Dept: CARDIOLOGY CLINIC | Age: 77
End: 2022-11-03

## 2022-11-03 PROBLEM — R06.09 DOE (DYSPNEA ON EXERTION): Status: ACTIVE | Noted: 2022-07-08

## 2022-11-03 LAB
ANION GAP SERPL CALCULATED.3IONS-SCNC: 8 MMOL/L (ref 3–16)
BASOPHILS ABSOLUTE: 0 K/UL (ref 0–0.2)
BASOPHILS RELATIVE PERCENT: 0.1 %
BUN BLDV-MCNC: 23 MG/DL (ref 7–20)
CALCIUM SERPL-MCNC: 9.4 MG/DL (ref 8.3–10.6)
CHLORIDE BLD-SCNC: 97 MMOL/L (ref 99–110)
CO2: 35 MMOL/L (ref 21–32)
CREAT SERPL-MCNC: 1.3 MG/DL (ref 0.6–1.2)
EKG ATRIAL RATE: 67 BPM
EKG DIAGNOSIS: NORMAL
EKG P AXIS: 79 DEGREES
EKG P-R INTERVAL: 192 MS
EKG Q-T INTERVAL: 430 MS
EKG QRS DURATION: 82 MS
EKG QTC CALCULATION (BAZETT): 454 MS
EKG R AXIS: -17 DEGREES
EKG T AXIS: 16 DEGREES
EKG VENTRICULAR RATE: 67 BPM
EOSINOPHILS ABSOLUTE: 0.1 K/UL (ref 0–0.6)
EOSINOPHILS RELATIVE PERCENT: 0.6 %
GFR SERPL CREATININE-BSD FRML MDRD: 42 ML/MIN/{1.73_M2}
GLUCOSE BLD-MCNC: 129 MG/DL (ref 70–99)
HCT VFR BLD CALC: 29 % (ref 36–48)
HEMOGLOBIN: 9.2 G/DL (ref 12–16)
INR BLD: 1.33 (ref 0.87–1.14)
L. PNEUMOPHILA SEROGP 1 UR AG: NORMAL
LV EF: 70 %
LVEF MODALITY: NORMAL
LYMPHOCYTES ABSOLUTE: 0.4 K/UL (ref 1–5.1)
LYMPHOCYTES RELATIVE PERCENT: 3.1 %
MCH RBC QN AUTO: 28.2 PG (ref 26–34)
MCHC RBC AUTO-ENTMCNC: 31.8 G/DL (ref 31–36)
MCV RBC AUTO: 88.8 FL (ref 80–100)
MONOCYTES ABSOLUTE: 0.3 K/UL (ref 0–1.3)
MONOCYTES RELATIVE PERCENT: 2.4 %
NEUTROPHILS ABSOLUTE: 11.5 K/UL (ref 1.7–7.7)
NEUTROPHILS RELATIVE PERCENT: 93.8 %
PDW BLD-RTO: 16.3 % (ref 12.4–15.4)
PLATELET # BLD: 175 K/UL (ref 135–450)
PMV BLD AUTO: 9.1 FL (ref 5–10.5)
POTASSIUM REFLEX MAGNESIUM: 4.3 MMOL/L (ref 3.5–5.1)
PROCALCITONIN: 0.07 NG/ML (ref 0–0.15)
PROTHROMBIN TIME: 16.4 SEC (ref 11.7–14.5)
RBC # BLD: 3.27 M/UL (ref 4–5.2)
SODIUM BLD-SCNC: 140 MMOL/L (ref 136–145)
STREP PNEUMONIAE ANTIGEN, URINE: NORMAL
TROPONIN: 0.03 NG/ML
TROPONIN: 0.04 NG/ML
WBC # BLD: 12.3 K/UL (ref 4–11)

## 2022-11-03 PROCEDURE — 85025 COMPLETE CBC W/AUTO DIFF WBC: CPT

## 2022-11-03 PROCEDURE — 2060000000 HC ICU INTERMEDIATE R&B

## 2022-11-03 PROCEDURE — 80048 BASIC METABOLIC PNL TOTAL CA: CPT

## 2022-11-03 PROCEDURE — 6370000000 HC RX 637 (ALT 250 FOR IP): Performed by: INTERNAL MEDICINE

## 2022-11-03 PROCEDURE — 99223 1ST HOSP IP/OBS HIGH 75: CPT | Performed by: INTERNAL MEDICINE

## 2022-11-03 PROCEDURE — 85610 PROTHROMBIN TIME: CPT

## 2022-11-03 PROCEDURE — 2500000003 HC RX 250 WO HCPCS: Performed by: STUDENT IN AN ORGANIZED HEALTH CARE EDUCATION/TRAINING PROGRAM

## 2022-11-03 PROCEDURE — 94640 AIRWAY INHALATION TREATMENT: CPT

## 2022-11-03 PROCEDURE — 6370000000 HC RX 637 (ALT 250 FOR IP): Performed by: NURSE PRACTITIONER

## 2022-11-03 PROCEDURE — 84145 PROCALCITONIN (PCT): CPT

## 2022-11-03 PROCEDURE — 6360000002 HC RX W HCPCS: Performed by: NURSE PRACTITIONER

## 2022-11-03 PROCEDURE — 93010 ELECTROCARDIOGRAM REPORT: CPT | Performed by: INTERNAL MEDICINE

## 2022-11-03 PROCEDURE — 36415 COLL VENOUS BLD VENIPUNCTURE: CPT

## 2022-11-03 PROCEDURE — 87449 NOS EACH ORGANISM AG IA: CPT

## 2022-11-03 PROCEDURE — 94760 N-INVAS EAR/PLS OXIMETRY 1: CPT

## 2022-11-03 PROCEDURE — 2580000003 HC RX 258: Performed by: NURSE PRACTITIONER

## 2022-11-03 PROCEDURE — 2700000000 HC OXYGEN THERAPY PER DAY

## 2022-11-03 PROCEDURE — 6360000002 HC RX W HCPCS: Performed by: STUDENT IN AN ORGANIZED HEALTH CARE EDUCATION/TRAINING PROGRAM

## 2022-11-03 PROCEDURE — 6360000002 HC RX W HCPCS: Performed by: INTERNAL MEDICINE

## 2022-11-03 PROCEDURE — 93306 TTE W/DOPPLER COMPLETE: CPT

## 2022-11-03 PROCEDURE — 84484 ASSAY OF TROPONIN QUANT: CPT

## 2022-11-03 RX ORDER — DOFETILIDE 0.25 MG/1
250 CAPSULE ORAL EVERY 12 HOURS
Status: DISCONTINUED | OUTPATIENT
Start: 2022-11-03 | End: 2022-11-07 | Stop reason: SDUPTHER

## 2022-11-03 RX ORDER — HYDRALAZINE HYDROCHLORIDE 10 MG/1
10 TABLET, FILM COATED ORAL EVERY 8 HOURS SCHEDULED
Status: DISCONTINUED | OUTPATIENT
Start: 2022-11-03 | End: 2022-11-03

## 2022-11-03 RX ORDER — ONDANSETRON 2 MG/ML
4 INJECTION INTRAMUSCULAR; INTRAVENOUS EVERY 6 HOURS PRN
Status: DISCONTINUED | OUTPATIENT
Start: 2022-11-03 | End: 2022-11-03

## 2022-11-03 RX ORDER — ALBUTEROL SULFATE 2.5 MG/3ML
2.5 SOLUTION RESPIRATORY (INHALATION) EVERY 4 HOURS PRN
COMMUNITY

## 2022-11-03 RX ORDER — ROSUVASTATIN CALCIUM 20 MG/1
20 TABLET, COATED ORAL DAILY
Status: DISCONTINUED | OUTPATIENT
Start: 2022-11-03 | End: 2022-11-08 | Stop reason: HOSPADM

## 2022-11-03 RX ORDER — SODIUM CHLORIDE 0.9 % (FLUSH) 0.9 %
10 SYRINGE (ML) INJECTION PRN
Status: DISCONTINUED | OUTPATIENT
Start: 2022-11-03 | End: 2022-11-08 | Stop reason: HOSPADM

## 2022-11-03 RX ORDER — ASPIRIN 81 MG/1
81 TABLET ORAL DAILY
Status: DISCONTINUED | OUTPATIENT
Start: 2022-11-03 | End: 2022-11-03

## 2022-11-03 RX ORDER — FUROSEMIDE 10 MG/ML
40 INJECTION INTRAMUSCULAR; INTRAVENOUS 2 TIMES DAILY
Status: DISCONTINUED | OUTPATIENT
Start: 2022-11-03 | End: 2022-11-05

## 2022-11-03 RX ORDER — FUROSEMIDE 10 MG/ML
20 INJECTION INTRAMUSCULAR; INTRAVENOUS ONCE
Status: COMPLETED | OUTPATIENT
Start: 2022-11-03 | End: 2022-11-03

## 2022-11-03 RX ORDER — FUROSEMIDE 40 MG/1
40 TABLET ORAL 2 TIMES DAILY
Status: DISCONTINUED | OUTPATIENT
Start: 2022-11-03 | End: 2022-11-03

## 2022-11-03 RX ORDER — SODIUM CHLORIDE 0.9 % (FLUSH) 0.9 %
10 SYRINGE (ML) INJECTION EVERY 12 HOURS SCHEDULED
Status: DISCONTINUED | OUTPATIENT
Start: 2022-11-03 | End: 2022-11-08 | Stop reason: HOSPADM

## 2022-11-03 RX ORDER — ONDANSETRON 4 MG/1
4 TABLET, ORALLY DISINTEGRATING ORAL EVERY 8 HOURS PRN
Status: DISCONTINUED | OUTPATIENT
Start: 2022-11-03 | End: 2022-11-03

## 2022-11-03 RX ORDER — ACETAMINOPHEN 325 MG/1
650 TABLET ORAL EVERY 6 HOURS PRN
Status: DISCONTINUED | OUTPATIENT
Start: 2022-11-03 | End: 2022-11-08 | Stop reason: HOSPADM

## 2022-11-03 RX ORDER — LABETALOL HYDROCHLORIDE 5 MG/ML
5 INJECTION, SOLUTION INTRAVENOUS ONCE
Status: COMPLETED | OUTPATIENT
Start: 2022-11-03 | End: 2022-11-03

## 2022-11-03 RX ORDER — CLOPIDOGREL BISULFATE 75 MG/1
75 TABLET ORAL DAILY
Status: DISCONTINUED | OUTPATIENT
Start: 2022-11-03 | End: 2022-11-08 | Stop reason: HOSPADM

## 2022-11-03 RX ORDER — LISINOPRIL 20 MG/1
20 TABLET ORAL DAILY
Status: DISCONTINUED | OUTPATIENT
Start: 2022-11-03 | End: 2022-11-07

## 2022-11-03 RX ORDER — ISOSORBIDE MONONITRATE 30 MG/1
30 TABLET, EXTENDED RELEASE ORAL DAILY
Status: DISCONTINUED | OUTPATIENT
Start: 2022-11-03 | End: 2022-11-08

## 2022-11-03 RX ORDER — CLOBETASOL PROPIONATE 0.5 MG/G
CREAM TOPICAL 2 TIMES DAILY PRN
COMMUNITY

## 2022-11-03 RX ORDER — METOPROLOL SUCCINATE 25 MG/1
25 TABLET, EXTENDED RELEASE ORAL DAILY
Status: DISCONTINUED | OUTPATIENT
Start: 2022-11-03 | End: 2022-11-04

## 2022-11-03 RX ORDER — ALBUTEROL SULFATE 2.5 MG/3ML
2.5 SOLUTION RESPIRATORY (INHALATION)
Status: DISCONTINUED | OUTPATIENT
Start: 2022-11-03 | End: 2022-11-08 | Stop reason: HOSPADM

## 2022-11-03 RX ORDER — SODIUM CHLORIDE 9 MG/ML
INJECTION, SOLUTION INTRAVENOUS PRN
Status: DISCONTINUED | OUTPATIENT
Start: 2022-11-03 | End: 2022-11-08 | Stop reason: HOSPADM

## 2022-11-03 RX ORDER — METHYLPREDNISOLONE SODIUM SUCCINATE 40 MG/ML
40 INJECTION, POWDER, LYOPHILIZED, FOR SOLUTION INTRAMUSCULAR; INTRAVENOUS EVERY 6 HOURS
Status: DISCONTINUED | OUTPATIENT
Start: 2022-11-03 | End: 2022-11-03

## 2022-11-03 RX ORDER — HYDRALAZINE HYDROCHLORIDE 25 MG/1
25 TABLET, FILM COATED ORAL EVERY 8 HOURS SCHEDULED
Status: DISCONTINUED | OUTPATIENT
Start: 2022-11-03 | End: 2022-11-04

## 2022-11-03 RX ORDER — HYDRALAZINE HYDROCHLORIDE 25 MG/1
12.5 TABLET, FILM COATED ORAL 3 TIMES DAILY
Status: ON HOLD | COMMUNITY
End: 2022-11-08 | Stop reason: HOSPADM

## 2022-11-03 RX ORDER — METHYLPREDNISOLONE SODIUM SUCCINATE 125 MG/2ML
125 INJECTION, POWDER, LYOPHILIZED, FOR SOLUTION INTRAMUSCULAR; INTRAVENOUS ONCE
Status: COMPLETED | OUTPATIENT
Start: 2022-11-03 | End: 2022-11-03

## 2022-11-03 RX ORDER — TRAZODONE HYDROCHLORIDE 50 MG/1
50 TABLET ORAL NIGHTLY PRN
Status: DISCONTINUED | OUTPATIENT
Start: 2022-11-03 | End: 2022-11-03

## 2022-11-03 RX ORDER — PREDNISONE 20 MG/1
40 TABLET ORAL DAILY
Status: DISCONTINUED | OUTPATIENT
Start: 2022-11-04 | End: 2022-11-04

## 2022-11-03 RX ORDER — IPRATROPIUM BROMIDE AND ALBUTEROL SULFATE 2.5; .5 MG/3ML; MG/3ML
1 SOLUTION RESPIRATORY (INHALATION)
Status: DISCONTINUED | OUTPATIENT
Start: 2022-11-03 | End: 2022-11-08 | Stop reason: HOSPADM

## 2022-11-03 RX ORDER — ACETAMINOPHEN 650 MG/1
650 SUPPOSITORY RECTAL EVERY 6 HOURS PRN
Status: DISCONTINUED | OUTPATIENT
Start: 2022-11-03 | End: 2022-11-08 | Stop reason: HOSPADM

## 2022-11-03 RX ADMIN — IPRATROPIUM BROMIDE AND ALBUTEROL SULFATE 1 AMPULE: .5; 3 SOLUTION RESPIRATORY (INHALATION) at 17:14

## 2022-11-03 RX ADMIN — FUROSEMIDE 40 MG: 10 INJECTION, SOLUTION INTRAMUSCULAR; INTRAVENOUS at 17:40

## 2022-11-03 RX ADMIN — SODIUM CHLORIDE, PRESERVATIVE FREE 10 ML: 5 INJECTION INTRAVENOUS at 08:05

## 2022-11-03 RX ADMIN — MOMETASONE FUROATE AND FORMOTEROL FUMARATE DIHYDRATE 2 PUFF: 200; 5 AEROSOL RESPIRATORY (INHALATION) at 21:35

## 2022-11-03 RX ADMIN — HYDRALAZINE HYDROCHLORIDE 25 MG: 25 TABLET, FILM COATED ORAL at 12:14

## 2022-11-03 RX ADMIN — HYDRALAZINE HYDROCHLORIDE 10 MG: 10 TABLET, FILM COATED ORAL at 03:37

## 2022-11-03 RX ADMIN — FUROSEMIDE 40 MG: 10 INJECTION, SOLUTION INTRAMUSCULAR; INTRAVENOUS at 12:14

## 2022-11-03 RX ADMIN — ROSUVASTATIN CALCIUM 20 MG: 20 TABLET, FILM COATED ORAL at 08:05

## 2022-11-03 RX ADMIN — ACETAMINOPHEN 650 MG: 325 TABLET ORAL at 03:37

## 2022-11-03 RX ADMIN — RIVAROXABAN 20 MG: 20 TABLET, FILM COATED ORAL at 08:05

## 2022-11-03 RX ADMIN — IPRATROPIUM BROMIDE AND ALBUTEROL SULFATE 1 AMPULE: .5; 3 SOLUTION RESPIRATORY (INHALATION) at 07:56

## 2022-11-03 RX ADMIN — HYDRALAZINE HYDROCHLORIDE 25 MG: 25 TABLET, FILM COATED ORAL at 21:51

## 2022-11-03 RX ADMIN — METOPROLOL SUCCINATE 25 MG: 25 TABLET, FILM COATED, EXTENDED RELEASE ORAL at 08:05

## 2022-11-03 RX ADMIN — METHYLPREDNISOLONE SODIUM SUCCINATE 125 MG: 125 INJECTION, POWDER, FOR SOLUTION INTRAMUSCULAR; INTRAVENOUS at 01:34

## 2022-11-03 RX ADMIN — ISOSORBIDE MONONITRATE 30 MG: 30 TABLET, EXTENDED RELEASE ORAL at 08:05

## 2022-11-03 RX ADMIN — CLOPIDOGREL BISULFATE 75 MG: 75 TABLET ORAL at 08:05

## 2022-11-03 RX ADMIN — LISINOPRIL 20 MG: 20 TABLET ORAL at 08:05

## 2022-11-03 RX ADMIN — MOMETASONE FUROATE AND FORMOTEROL FUMARATE DIHYDRATE 2 PUFF: 200; 5 AEROSOL RESPIRATORY (INHALATION) at 08:06

## 2022-11-03 RX ADMIN — SODIUM CHLORIDE, PRESERVATIVE FREE 10 ML: 5 INJECTION INTRAVENOUS at 21:51

## 2022-11-03 RX ADMIN — LABETALOL HYDROCHLORIDE 5 MG: 5 INJECTION, SOLUTION INTRAVENOUS at 06:42

## 2022-11-03 RX ADMIN — DOFETILIDE 250 MCG: 0.25 CAPSULE ORAL at 21:51

## 2022-11-03 RX ADMIN — IPRATROPIUM BROMIDE AND ALBUTEROL SULFATE 1 AMPULE: .5; 3 SOLUTION RESPIRATORY (INHALATION) at 21:32

## 2022-11-03 RX ADMIN — FUROSEMIDE 20 MG: 10 INJECTION, SOLUTION INTRAMUSCULAR; INTRAVENOUS at 06:47

## 2022-11-03 RX ADMIN — METHYLPREDNISOLONE SODIUM SUCCINATE 40 MG: 40 INJECTION, POWDER, FOR SOLUTION INTRAMUSCULAR; INTRAVENOUS at 06:10

## 2022-11-03 RX ADMIN — ASPIRIN 81 MG: 81 TABLET, COATED ORAL at 08:05

## 2022-11-03 RX ADMIN — DOFETILIDE 250 MCG: 0.25 CAPSULE ORAL at 08:05

## 2022-11-03 RX ADMIN — IPRATROPIUM BROMIDE AND ALBUTEROL SULFATE 1 AMPULE: .5; 3 SOLUTION RESPIRATORY (INHALATION) at 11:58

## 2022-11-03 ASSESSMENT — PAIN DESCRIPTION - DESCRIPTORS: DESCRIPTORS: ACHING

## 2022-11-03 ASSESSMENT — PAIN SCALES - GENERAL: PAINLEVEL_OUTOF10: 3

## 2022-11-03 ASSESSMENT — PAIN - FUNCTIONAL ASSESSMENT: PAIN_FUNCTIONAL_ASSESSMENT: ACTIVITIES ARE NOT PREVENTED

## 2022-11-03 ASSESSMENT — PAIN DESCRIPTION - LOCATION: LOCATION: BACK

## 2022-11-03 NOTE — TELEPHONE ENCOUNTER
I will jia into at my 1st availability and update accordingly. Currently admitted. Will discuss timing with Dr. Bobbi Duarte.

## 2022-11-03 NOTE — PROGRESS NOTES
Physician Progress Note      PATIENT:               Jennifer Sexton  CSN #:                  [id number]  :                       1945  ADMIT DATE:       2022 7:21 PM  [address] [place] DATE:  RESPONDING  PROVIDER #:        Jessica Segura MD          QUERY TEXT:    Dear Dr. Dilip Guillermo,  Patient admitted with Acute respiratory failure with hypoxia due to severe   COPD and heart failure . Noted documentation of acute respiratory failure in H   and P on . Pt is on O2 4l/min NC at baseline home O2 and here lowest O2   sat on 4L is 92%. In order to support the diagnosis of acute respiratory   failure, please include additional clinical indicators in your documentation. Or please document if the diagnosis of acute respiratory failure has been   ruled out after further study. The medical record reflects the following:  Risk Factors: Hx CHF, COPD, CAD with recent stent, Home O2 4L/min NC, (H and P   notes  reported O2 sats in 76s and  62s with exertion at home and did   not improve with rest)  Clinical Indicators:  ED for SOB worsening over last 2 days, + cough, on   chronic O2 4L/min NC at home, ED notes Noel Hernandez has not hypoxic at rest on her   home oxygen\", Pulmonary effort normal\", \"no respiratory distress\" Resp=24/min,   \"in no apparent distress\", H and P notes \"increased respiratory effort, \"no   accessory muscle use\", and Admitted for \" Acute respiratory failure with   hypoxia due to severe copd and heart failure- with increased work of breath,   tachypnea and hypoxia\" O2 sats here=% on 4L/min NC  Treatment: O2 4L/min per NC, O2 sat monitor, Pulm consult  Thank you,  Nicole Butterfield RN, CDS  [email or web address]    Acute Respiratory Failure Clinical Indicators per [employer] Training Guide and   Quick Reference Guide:  pO2 < 60 mmHg or SpO2 (pulse oximetry) < 91% breathing room air  pCO2 > 50 and pH < 7.35  P/F ratio (pO2 / FIO2) < 300  pO2 decrease or pCO2 increase by 10 mmHg from baseline (if known)  Supplemental oxygen of 40% or more  Presence of respiratory distress, tachypnea, dyspnea, shortness of breath,   wheezing  Unable to speak in complete sentences  Use of accessory muscles to breathe  Extreme anxiety and feeling of impending doom  Tripod position  Confusion/altered mental status/obtunded  Options provided:  -- Acute Respiratory Failure ruled out after study and Chronic Respiratory   Failure confirmed  -- Acute Respiratory Failure as evidenced by, Please document evidence. -- Other - I will add my own diagnosis  -- Disagree - Not applicable / Not valid  -- Disagree - Clinically unable to determine / Unknown  -- Refer to Clinical Documentation Reviewer    PROVIDER RESPONSE TEXT:    This patient is in acute respiratory failure as evidenced by Increased work of   breathing oxygen saturation in 60s with 4 L nasal cannula oxygen that is   patient's baseline. Oxygen at home    Query created by:  101Shasha Curtis on 11/3/2022 11:11 AM      Electronically signed by:  Hannah Nolasco MD 11/3/2022 2:15 PM

## 2022-11-03 NOTE — CONSULTS
years ago. She has a 37.00 pack-year smoking history. She has been exposed to tobacco smoke. She has never used smokeless tobacco.    Scheduled Meds:   sodium chloride flush  10 mL IntraVENous 2 times per day    ipratropium-albuterol  1 ampule Inhalation Q4H WA    clopidogrel  75 mg Oral Daily    dofetilide  250 mcg Oral Q12H    isosorbide mononitrate  30 mg Oral Daily    metoprolol succinate  25 mg Oral Daily    lisinopril  20 mg Oral Daily    rivaroxaban  20 mg Oral Daily with breakfast    rosuvastatin  20 mg Oral Daily    mometasone-formoterol  2 puff Inhalation BID    aspirin EC  81 mg Oral Daily    methylPREDNISolone  40 mg IntraVENous Q6H    hydrALAZINE  10 mg Oral 3 times per day    furosemide  40 mg Oral BID       Continuous Infusions:   sodium chloride         PRN Meds:  sodium chloride flush, sodium chloride, magnesium hydroxide, acetaminophen **OR** acetaminophen, albuterol, traZODone    ALLERGIES:  Patient is allergic to morphine. REVIEW OF SYSTEMS:  Constitutional: Negative for fever or chills  HENT: Negative for sore throat  Eyes: Negative for redness   Respiratory: SOB, cough with clear mucus   Cardiovascular: Negative for chest pain  Gastrointestinal: Negative for vomiting, diarrhea   Genitourinary: Negative for hematuria, negative for dysuria  Musculoskeletal: Negative for arthralgias   Skin: Negative for rash  Neurological: Negative for syncope  Hematological: Negative for adenopathy  Extremities:  Negative for swelling    PHYSICAL EXAM:  Blood pressure (!) 169/83, pulse 80, temperature 98.5 °F (36.9 °C), temperature source Oral, resp. rate (!) 33, height 5' 4\" (1.626 m), weight 230 lb 6.1 oz (104.5 kg), SpO2 96 %, not currently breastfeeding.'  Gen: No distress. Eyes: PERRL. No sclera icterus. No conjunctival injection. ENT: No discharge. Pharynx clear. Neck: Trachea midline. No obvious mass. Resp: Diminished, faint crackles   CV: Regular rate. Regular rhythm. No murmur or rub.   GI: Non-tender. Non-distended. No hernia. BS present. Skin: Warm and dry. No nodule on exposed extremities. Lymph: No cervical LAD. No supraclavicular LAD. M/S: No cyanosis. No joint deformity. Neuro: Awake. Alert. Moves all four extremities. EXT:   no edema, no clubbing    LABS:  CBC:   Recent Labs     11/02/22 1929 11/03/22 0418   WBC 11.1* 12.3*   HGB 9.0* 9.2*   HCT 28.7* 29.0*   MCV 89.6 88.8    175     BMP:   Recent Labs     11/02/22 1929 11/03/22 0418    140   K 4.2 4.3   CL 94* 97*   CO2 39* 35*   BUN 21* 23*   CREATININE 1.2 1.3*     LIVER PROFILE:   Recent Labs     11/02/22 1929   AST 15   ALT 9*   BILITOT 0.3   ALKPHOS 64     PT/INR:   Recent Labs     11/03/22 0418   PROTIME 16.4*   INR 1.33*     APTT: No results for input(s): APTT in the last 72 hours. UA:  Recent Labs     11/02/22 1929   COLORU Yellow   PHUR 7.0   WBCUA 9*   RBCUA 2   BACTERIA None Seen   CLARITYU Clear   SPECGRAV 1.009   LEUKOCYTESUR SMALL*   UROBILINOGEN 0.2   BILIRUBINUR Negative   BLOODU Negative   GLUCOSEU Negative     No results for input(s): PHART, [id number], PO2ART in the last 72 hours. Cultures:   NGTD to date  Negative procal     PFTs:    Severe obstruction      ECHO: 9/21   Mod conc. LVH with normal LV size & wall motion. EF is    60%. Diastolic   filling parameters suggest grade II diastolic dysfunction. The left atrium is severely dilated. Normal right ventricular size and function. Trivial mitral, aortic and tricuspid regurgitation. ABG:  None    Chest X-ray:  Chest imaging was reviewed by me and showed possible vascular congestion       I reviewed all the above labs and studies pertaining to this visit.       ASSESSMENT/PLAN:  Acute on Chronic Hypoxic Respiratory Failure  Titrate oxygen for saturations greater than or equal to 90%  Baseline is aroudn 3 liters  Severe COPD, not clear if this is flared up vs CHF   Not convinced this is flared up as diuresis has helped her breathing more than breathing treatments. Continue with duonebs q 4 hours  Continue with dulera q 12 hours  DC solumedrol. Prednisone tomorrow  Abnormal CXR with suggestion of pulmonary edema   Recommend IV diuresis  May need a RHC to confirm etiology of her breathing issues.   This has been going on and off for months and I will be honest I am not entirely sure which is at play more, COPD v CHF    DVT prophylaxis  Xarelto        Minus Deni, DO  [facility]

## 2022-11-03 NOTE — DISCHARGE INSTR - COC
Continuity of Care Form    Patient Name: Dick Camargo   :  1945  MRN:  [id number]    [facility] date:  2022  Discharge date:  2022    Code Status Order: Full Code   Advance Directives:     Admitting Physician:  Salma Martinez DO  PCP: Daniela Beaulieu MD    Discharging Nurse: [facility] Unit/Room#: L9U-2736/1109-28  Discharging Unit Phone Number: [phone]    Emergency Contact:   Extended Emergency Contact Information  Primary Emergency Contact: Linda Perez  Address: [address] [facility] [address] [facility]           [address] [facility], [address] Phone: [phone]  Relation: Spouse  Secondary Emergency Contact: Theodore [address] Phone: [phone]  Relation: Child    Past Surgical History:  Past Surgical History:   Procedure Laterality Date    ABDOMINAL AORTIC ANEURYSM REPAIR      Endovascular abdominal AA    APPENDECTOMY      incidental    BLADDER SUSPENSION      CARDIAC SURGERY      CATARACT REMOVAL      CHOLECYSTECTOMY  10/15/13    COLONOSCOPY  07    dr Shanon Pompa and check in 5 years. COLONOSCOPY  2017    ok dr arndt, repeat 5 years    HYSTERECTOMY ([facility])      for benign tumor.  just the uterus    JOINT REPLACEMENT  2013    right knee replacement    TONSILLECTOMY  as a child    TUMOR EXCISION      benign behind right ear about        Immunization History:   Immunization History   Administered Date(s) Administered    COVID-19, PFIZER GRAY top, DO NOT Dilute, (age 15 y+), IM, 30 mcg/0.3 mL 2022    COVID-19, PFIZER PURPLE top, DILUTE for use, (age 15 y+), 30mcg/0.3mL 2021, 2021, 10/20/2021    Influenza Vaccine, unspecified formulation 2018    Influenza, FLUARIX, FLULAVAL, FLUZONE (age 10 mo+) AND AFLURIA, (age 1 y+), PF, 0.5mL 10/08/2020    Pneumococcal Conjugate 13-valent ([id number]) 2017    Pneumococcal Polysaccharide ([id number]) 2015       Active Problems:  Patient Active Problem List   Diagnosis Code    Primary hypertension I10    Hyperlipidemia E78.5    Coronary atherosclerosis due to calcified coronary lesion of native artery I25.10, I25.84    DENISE (obstructive sleep apnea) G47.33    History of DVT (deep vein thrombosis) Z86.718    Family history of DVT Z82.49    AAA (abdominal aortic aneurysm) without rupture I71.40    Pleural effusion, left J90    Pleural effusion J90    COPD exacerbation ([facility]) J44.1    Pelvic pain in female R10.2    Vitamin D deficiency E55.9    Other emphysema ([facility]) J43.8    Acute bronchitis J20.9    Acute respiratory failure with hypoxia ([facility]) J96.01    Abnormal CXR R93.89    Atypical pneumonia J18.9    Atrial fibrillation with RVR ([facility]) I48.91    Chronic diastolic heart failure ([facility]) I50.32    PVD (peripheral vascular disease) ([facility]) I73.9    Abnormal nuclear stress test R94.39    AAA (abdominal aortic aneurysm) I71.40    PAF (paroxysmal atrial fibrillation) ([facility]) I48.0    Endoleak post (EVAR) endovascular aneurysm repair, sequela [id number]    Carotid artery stenosis without cerebral infarction, bilateral I65.23    History of repair of aneurysm of abdominal aorta using endovascular stent graft Z95.828    Atherosclerotic heart disease of native coronary artery with other forms of angina pectoris ([address] I25.118    Morbidly obese ([facility]) E66.01    COPD, severe ([address] J44.9    Hypertensive crisis I16.9    Acute on chronic diastolic congestive heart failure ([address] I50.33    Respiratory failure ([address] J96.90    Left atrial flutter by electrocardiogram ([address] I48.92    Persistent atrial fibrillation ([facility]) I48.19    A-fib ([facility]) I48.91    Obesity (BMI 30-39. 9) E66.9    Atrial fibrillation ([facility]) I48.91    LI (dyspnea on exertion) R06.09    Chronic systolic (congestive) heart failure I50.22    Pneumonia J18.9    Acute on chronic respiratory failure with hypoxia ([facility]) J96.21       Isolation/Infection:   Isolation            No Isolation           Unreconciled Outside Infections External data in this report might not trigger clinical decision support. .      Infection Onset Last Indicated Last Received Source    No mapped external infections found      . Unmapped Infections        MRSA 10/30/18 10/30/18 08/04/20 [facility]                   Patient Infection Status       Infection Onset Added Last Indicated Last Indicated By Review Planned Expiration Resolved Resolved By    None active    Resolved    COVID-19 (Rule Out) 11/02/22 11/02/22 11/02/22 COVID-19, Rapid (Ordered)   11/02/22 Rule-Out Test Resulted    COVID-19 (Rule Out) 07/26/22 07/26/22 07/26/22 COVID-19, Rapid (Ordered)   07/26/22 Rule-Out Test Resulted    COVID-19 (Rule Out) 09/24/20 09/24/20 09/24/20 COVID-19 (Ordered)   09/25/20 Charan Chambers RN            Nurse Assessment:  Last Vital Signs: BP (!) 155/83   Pulse 60   Temp 98.2 °F (36.8 °C) (Oral)   Resp 18   Ht 5' 4\" (1.626 m)   Wt 230 lb 6.1 oz (104.5 kg)   SpO2 98%   BMI 39.54 kg/m²     Last documented pain score (0-10 scale): Pain Level: 3  Last Weight:   Wt Readings from Last 1 Encounters:   11/03/22 230 lb 6.1 oz (104.5 kg)     Mental Status:  oriented and alert    IV Access:  - None    Nursing Mobility/ADLs:  Walking   Assisted  Transfer  Assisted  Bathing  Assisted  Dressing  Assisted  Toileting  Assisted  Feeding  Independent  Med Admin  Assisted  Med Delivery   whole    Wound Care Documentation and Therapy:  Puncture 10/07/20 Groin Anterior;Right (Active)   Number of days: 756        Elimination:  Continence: Bowel: No  Bladder: No  Urinary Catheter: None   Colostomy/Ileostomy/Ileal Conduit: No       Date of Last BM:     Intake/Output Summary (Last 24 hours) at 11/3/2022 1345  Last data filed at 11/3/2022 0849  Gross per 24 hour   Intake 240 ml   Output 1150 ml   Net -910 ml     I/O last 3 completed shifts:   In: 240 [P.O.:240]  Out: 150 [Urine:150]    Safety Concerns:     None    Impairments/Disabilities:      None    Nutrition Therapy:  Current Nutrition Therapy:   - Oral Diet:  General    Routes of Feeding: Oral  Liquids: Thin Liquids  Daily Fluid Restriction: no  Last Modified Barium Swallow with Video (Video Swallowing Test): not done    Treatments at the Time of Hospital Discharge:   Respiratory Treatments:   Oxygen Therapy:  is on oxygen at 3 L/min per nasal cannula. Ventilator:    - No ventilator support    Rehab Therapies: Physical Therapy and Occupational Therapy  Weight Bearing Status/Restrictions: No weight bearing restrictions  Other Medical Equipment (for information only, NOT a DME order):  walker and bath bench  Other Treatments: [facility]: LEVEL 3 [address] to establish plan of care for patient over 60 day period   Nursing  Initial home SN evaluation visit to occur within 24-48 hours for:  1)  medication management  2)  VS and clinical assessment  3)  S&S chronic disease exacerbation education + when to contact MD/NP  4)  care coordination  Medication Reconciliation during 1st SN visit  PT/OT/Speech   Evaluations in home within 24-48 hours of discharge to include DME and home safety   Frontload therapy 5 days, then 3x a week   OT to evaluate if patient has [address] needs for personal care    evaluation within 24-48 hours to evaluate resources & insurance for potential AL, [place], LTC, and Medicaid options   Palliative Care referral within 5 days of hospital discharge   PCP Visit scheduled within 3 - 7 days of hospital discharge    [address] (If patient is agreeable and meets guidelines)      Heart Failure Instructions for Daily Management  Patient was treated for acute on chronic diastolic heart failure. she  will require the following:    Please weigh daily on the same scale and approximately the same time of day. Report weight gain of 3 pounds/day or 5 pounds/week to : Francisca Coleman [phone] and [facility] [phone].   Please use hospital discharge weight as baseline reference. Please monitor for signs and symptoms of and report to MD:  Worsening Heart Failure: sudden weight gain, shortness of breath, lower extremity or general edema/swelling, abdominal bloating/swelling, inability to lie flat, intolerance to usual activity, or cough (especially at night). Report these finding even if no increase in weight. Dehydration:  having difficulty or a decrease in urination, dizziness, worsening fatigue, or new onset/worsening of generalized weakness. Please continue a LOW SODIUM diet and LIMIT fluid intake to 48 - 64 ounces ( 1.5 - 2 liters) per day. Call Papa Ardon [phone] and [facility] [phone] and/or Chiki Nora Canales @ [phone] with any questions or concerns. Please continue heart failure education to patient and family/support system. See After Visit Summary for hospital follow up appointment details. Consider spiritual care referral for support and/or completion of advance directives [phone]. Consider: [place] [id number] telehealth program if patient agreeable and able to participate, palliative care consult for ongoing goals of care, end of life, and/or chronic disease management discussions, and referral to [facility] [phone]) once SNF/HHC complete.       Patient's personal belongings (please select all that are sent with patient):  Glasses    RN SIGNATURE:  Electronically signed by Rhiannon Mcginnis RN on 11/8/22 at 2:18 PM EST    CASE MANAGEMENT/SOCIAL WORK SECTION    Inpatient Status Date: 11/2/2022    Readmission Risk Assessment Score:  Readmission Risk              Risk of Unplanned Readmission:  26         Discharging to Facility/ Agency   Name:     [facility]  Address:  [address] [place] [address],  [address], [address] [facility] [address]  Phone:     [phone]  Fax:         [phone]    / signature: Electronically signed by Wilman Hunter PRATIMA-S on 11/8/22 at 1:57 PM EST    PHYSICIAN SECTION    Prognosis: Fair    Condition at Discharge: Stable    Rehab Potential (if transferring to Rehab): Fair    Recommended Labs or Other Treatments After Discharge: Follow-up with PCP within 7 days from discharge, not doing so could have life-threatening consequences. Take medication as instructed;  return to the emergency department if persistent symptoms, experiencing side effects from medication including nausea vomiting or unable to keep medications down. Physician Certification: I certify the above information and transfer of Kushal Sterling  is necessary for the continuing treatment of the diagnosis listed and that she requires [address] for greater 30 days.      Update Admission H&P: No change in H&P    PHYSICIAN SIGNATURE:  Electronically signed by Richie Pickett MD on 11/8/22 at 12:02 PM EST

## 2022-11-03 NOTE — H&P
Hospital Medicine History & Physical      PCP: Matthew Dyson MD    Date of Admission: 11/2/2022    Date of Service: Pt seen/examined on 11/2/2022 and Admitted to Inpatient with expected LOS greater than two midnights due to medical therapy. Chief Complaint:  Shortness of breath      History Of Present Illness:      69 y.o. female with PMHx of A-fib, CAD, CHF, Severe COPD, HLD and HTN presented [facility] with shortness of breath. Pt states that she always has some shortness of breath but she is usually able to get up to commode without becoming severely short of breath and hypoxic on her home 4 liters.  reports sometimes her oxygen reading will dip down into the 70's but once she sits back down it goes back up to 90's. Today is stayed in the 60's. She has chronic cough with clear phlegm, no reported fever or chills. No chest pain or dizziness. She was at her baseline yesterday. She reports taking all home meds as directed. Past Medical History:          Diagnosis Date    AAA (abdominal aortic aneurysm)     pt states it is 4cm    AAA (abdominal aortic aneurysm) without rupture 2/10/2015    Atrial fibrillation (HCC)     CAD (coronary artery disease)     CHF (congestive heart failure) (HCC)     COPD (chronic obstructive pulmonary disease) [facility]     History of blood clots     Hyperlipidemia     Hypertension        Past Surgical History:          Procedure Laterality Date    ABDOMINAL AORTIC ANEURYSM REPAIR      Endovascular abdominal AA    APPENDECTOMY  1990    incidental    BLADDER SUSPENSION      CARDIAC SURGERY      CATARACT REMOVAL      CHOLECYSTECTOMY  10/15/13    COLONOSCOPY  9/2/07    dr Carmen Regan and check in 5 years. COLONOSCOPY  06/16/2017    ok dr arndt, repeat 5 years    HYSTERECTOMY ([facility])  1990    for benign tumor.  just the uterus    JOINT REPLACEMENT  12/2013    right knee replacement    TONSILLECTOMY  as a child    TUMOR EXCISION      benign behind right ear about 2008       Medications Prior to Admission:      Prior to Admission medications    Medication Sig Start Date End Date Taking? Authorizing Provider   rivaroxaban (XARELTO) 20 MG TABS tablet TAKE ONE TABLET BY MOUTH DAILY WITH BREAKFAST 10/24/22   Yuri Ramirez DO   ranolazine (RANEXA) 500 MG extended release tablet  9/23/22   Historical Provider, MD   nitroGLYCERIN (NITROSTAT) 0.4 MG SL tablet Place 1 tablet under the tongue every 5 minutes as needed for Chest pain 9/29/22   Arun Ren MD   clopidogrel (PLAVIX) 75 MG tablet Take 1 tablet by mouth daily 9/20/22   Juve Major MD   lisinopril (PRINIVIL;ZESTRIL) 20 MG tablet TAKE ONE TABLET BY MOUTH DAILY 8/19/22   Arun Ren MD   traZODone (DESYREL) 50 MG tablet Take 1 tablet by mouth nightly as needed for Sleep 8/2/22   Francisca Coleman MD   SYMBICORT 160-4.5 MCG/ACT AERO Inhale 2 puffs into the lungs in the morning and 2 puffs before bedtime. 7/21/22   Yuri Ramirez DO   aspirin EC 81 MG EC tablet Take 1 tablet by mouth in the morning. 7/20/22   Kimberley Calvillo APRN - FRANNY   isosorbide mononitrate (IMDUR) 30 MG extended release tablet Take 1 tablet by mouth in the morning.  . 7/20/22   Kimebrley Calvillo, APRN - CNP   hydrALAZINE (APRESOLINE) 10 MG tablet Take 1 tablet by mouth every 8 hours 7/12/22   Kimberley Calvillo APRN - CNP   vitamin C (ASCORBIC ACID) 500 MG tablet 500 mg    Historical Provider, MD   metoprolol succinate (TOPROL XL) 25 MG extended release tablet TAKE ONE TABLET BY MOUTH DAILY 7/8/22   Dejuan Bridges MD   dofetilide (TIKOSYN) 250 MCG capsule Take 1 capsule by mouth every 12 hours 6/27/22   Anushka Pa MD   furosemide (LASIX) 40 MG tablet Take 1 tablet by mouth 2 times daily 6/24/22   Arun Ren MD   albuterol sulfate  (90 Base) MCG/ACT inhaler INHALE TWO PUFFS BY MOUTH EVERY 4 HOURS AS NEEDED FOR WHEEZING OR FOR SHORTNESS OF BREATH 2/10/22   Yuri Ramirez, DO rosuvastatin (CRESTOR) 20 MG tablet Take 1 tablet by mouth daily 1/25/22   Jamarcus Longdavid, APRN - CNP   Multiple Vitamins-Minerals (MULTI FOR HER 50+ PO) Take 1 tablet by mouth daily    Historical Provider, MD       Allergies:  Morphine    Social History:      The patient currently lives home with     TOBACCO:   reports that she quit smoking about 9 years ago. Her smoking use included cigarettes. She started smoking about 45 years ago. She has a 37.00 pack-year smoking history. She has been exposed to tobacco smoke. She has never used smokeless tobacco.  ETOH:   reports no history of alcohol use. Family History:      Reviewed in detail positive as follows:        Problem Relation Age of Onset    Heart Disease Father     Hypertension Other        REVIEW OF SYSTEMS:   Pertinent positives as noted in the HPI. All other systems reviewed and negative. PHYSICAL EXAM PERFORMED:    BP (!) 175/63   Pulse 66   Temp 97.9 °F (36.6 °C) (Oral)   Resp 24   Ht 5' 4\" (1.626 m)   Wt 234 lb 9.1 oz (106.4 kg)   SpO2 96%   BMI 40.26 kg/m²     General appearance:  Chronically ill appearing  female lying on hospital bed uncomfortable in appearance but in no apparent distress, appears stated age and cooperative. HEENT:  Normal cephalic, atraumatic without obvious deformity. Pupils equal, round, and reactive to light. Conjunctivae/corneas clear. Neck: Supple, with full range of motion. No jugular venous distention. Trachea midline. Respiratory:  Increased respiratory effort. Diminished breath sounds with exp wheezing, no rales or rhonchi. No accessory muscle use. Cardiovascular:  Regular rate and rhythm without murmurs, trace lower extremity edema. Abdomen: Soft, morbidly obese abdomen, non-tender, non-distended, without rebound or guarding. Normal bowel sounds. Musculoskeletal:  Moves all extremities equally. Full range of motion without deformity. Skin: Skin warm, dry and intact.   No rashes or lesions. Neurologic:  Neurovascularly intact without any focal sensory/motor deficits. Cranial nerves: II-XII intact, grossly non-focal.  Psychiatric:  Alert and oriented, thought content appropriate, normal insight  Capillary Refill: Brisk,< 3 seconds   Peripheral Pulses: +2 palpable, equal bilaterally       Labs:     Recent Labs     11/02/22 1929   WBC 11.1*   HGB 9.0*   HCT 28.7*        Recent Labs     11/02/22 1929      K 4.2   CL 94*   CO2 39*   BUN 21*   CREATININE 1.2   CALCIUM 9.4     Recent Labs     11/02/22 1929   AST 15   ALT 9*   BILITOT 0.3   ALKPHOS 64     No results for input(s): INR in the last 72 hours. Recent Labs     11/02/22 1929   TROPONINI 0.04*       Urinalysis:      Lab Results   Component Value Date/Time    NITRU Negative 11/02/2022 07:29 PM    WBCUA 9 11/02/2022 07:29 PM    BACTERIA None Seen 11/02/2022 07:29 PM    RBCUA 2 11/02/2022 07:29 PM    BLOODU Negative 11/02/2022 07:29 PM    SPECGRAV 1.009 11/02/2022 07:29 PM    GLUCOSEU Negative 11/02/2022 07:29 PM       Radiology:     EKG:  I have reviewed the EKG with the following interpretation: normal sinus rhythm, rate 67. No acute ST elevation    XR CHEST PORTABLE   Final Result   Mild pulmonary vascular congestion. Stable mild cardiomegaly. ASSESSMENT:    Active Hospital Problems    Diagnosis Date Noted    Acute on chronic respiratory failure with hypoxia [facility]) [J96.21] 11/02/2022     Priority: Medium         PLAN:    Acute respiratory failure with hypoxia due to severe copd and heart failure  - with increased work of breath, tachypnea and hypoxia  - home 4 liters saturation 67%  - provide supplemental oxygen as necessary to keep SaO2 88-90%     Severe COPD   - consult pulm- last visit with Dr Lelia Saleem 10/13/22  - Nebulizer treatments every 4 hours and every 2 hours prn   - Solumedrol will be tapered as the patient improves.    - Patient will be monitored closely, and deep breathing and coughing will be encouraged while awake. Chronic diastolic CHF    - on exam does not appear to be fluid overload  - last ECHO: 9/22   - bnp 3858  - cont lasix, BB, ACEi  - daily wts  - I/Os  - consult CHF RN  - consult cardiology last visit 9/29/22 (has appt tomorrow 11/3)    CAD with PCI and JORJE 9/20/2022  - Successful PCI of proximal Lcx with DESx2  - continue bb, acei, imdur, hydralazine and nitro prn  - continue asa, plavix and statin    Atrial Fibrillation   - currently rate controlled  - continue Xarelto and dofetilide     Essential (primary) hypertension   - monitor blood pressure  - continue home meds     DVT Prophylaxis: Xarelto  Diet: ADULT DIET; Regular; Low Fat/Low Chol/High Fiber/DOMENICA; Low Sodium (2 gm); 1200 ml  Code Status: Full Code    Dispo - Inpatient       CHRISTINE Snowden - CNP    Thank you Matthew Dyson MD for the opportunity to be involved in this patient's care. If you have any questions or concerns please feel free to contact me at [phone].

## 2022-11-03 NOTE — PLAN OF CARE
Problem: Discharge Planning  Goal: Discharge to home or other facility with appropriate resources  11/3/2022 1007 by Derick Willis RN  Outcome: Progressing     Problem: Safety - Adult  Goal: Free from fall injury  11/3/2022 1007 by Derick Willis RN  Outcome: Progressing     Problem: Pain  Goal: Verbalizes/displays adequate comfort level or baseline comfort level  Outcome: Progressing

## 2022-11-03 NOTE — CONSULTS
[facility]  HEART FAILURE PROGRAM      NAME:  Pinky Garcia RECORD NUMBER:  [id number]  AGE: 69 y.o.    GENDER: female  : 1945  TODAY'S DATE:  11/3/2022    Subjective:     VISIT TYPE: evaluation     ADMITTING PHYSICIAN:  Gwyn Hoang DO    PAST MEDICAL HISTORY        Diagnosis Date    AAA (abdominal aortic aneurysm)     pt states it is 4cm    AAA (abdominal aortic aneurysm) without rupture 2/10/2015    Atrial fibrillation (HCC)     CAD (coronary artery disease)     CHF (congestive heart failure) (HCC)     COPD (chronic obstructive pulmonary disease) ([facility]     History of blood clots     Hyperlipidemia     Hypertension        SOCIAL HISTORY    Social History     Tobacco Use    Smoking status: Former     Packs/day: 1.00     Years: 37.00     Pack years: 37.00     Types: Cigarettes     Start date: 1976     Quit date: 2013     Years since quittin.1     Passive exposure: Past    Smokeless tobacco: Never    Tobacco comments:     E cigarette now and then   Vaping Use    Vaping Use: Former    Quit date: 2021    Substances: Nicotine   Substance Use Topics    Alcohol use: No    Drug use: No       ALLERGIES    Allergies   Allergen Reactions    Morphine Rash     rash       MEDICATIONS  Scheduled Meds:   sodium chloride flush  10 mL IntraVENous 2 times per day    ipratropium-albuterol  1 ampule Inhalation Q4H WA    clopidogrel  75 mg Oral Daily    dofetilide  250 mcg Oral Q12H    isosorbide mononitrate  30 mg Oral Daily    metoprolol succinate  25 mg Oral Daily    lisinopril  20 mg Oral Daily    rivaroxaban  20 mg Oral Daily with breakfast    rosuvastatin  20 mg Oral Daily    mometasone-formoterol  2 puff Inhalation BID    [START ON 2022] predniSONE  40 mg Oral Daily    furosemide  40 mg IntraVENous BID    hydrALAZINE  25 mg Oral 3 times per day       ADMIT DATE: 2022      Objective:     ADMISSION DIAGNOSIS:   LI (dyspnea on exertion) [R06.09]  Elevated troponin [R77.8]  Acute on chronic respiratory failure with hypoxia (HCC) [J96.21]     BP (!) 155/83   Pulse 60   Temp 98.2 °F (36.8 °C) (Oral)   Resp 18   Ht 5' 4\" (1.626 m)   Wt 230 lb 6.1 oz (104.5 kg)   SpO2 98%   BMI 39.54 kg/m²     ADMIT:  Weight: 234 lb 9.1 oz (106.4 kg)    TODAY: Weight: 230 lb 6.1 oz (104.5 kg)    Wt Readings from Last 10 Encounters:   11/03/22 230 lb 6.1 oz (104.5 kg)   10/13/22 228 lb (103.4 kg)   09/29/22 219 lb (99.3 kg)   09/20/22 219 lb (99.3 kg)   09/07/22 226 lb (102.5 kg)   09/01/22 225 lb (102.1 kg)   08/05/22 225 lb (102.1 kg)   08/01/22 231 lb 0.7 oz (104.8 kg)   07/21/22 226 lb 12.8 oz (102.9 kg)   07/19/22 226 lb 9.6 oz (102.8 kg)          Intake/Output Summary (Last 24 hours) at 11/3/2022 1349  Last data filed at 11/3/2022 0849  Gross per 24 hour   Intake 240 ml   Output 1150 ml   Net -910 ml       LABS  BMP:   Lab Results   Component Value Date/Time     11/03/2022 04:18 AM    K 4.3 11/03/2022 04:18 AM    CL 97 11/03/2022 04:18 AM    CO2 35 11/03/2022 04:18 AM    BUN 23 11/03/2022 04:18 AM    LABALBU 3.9 11/02/2022 07:29 PM    CREATININE 1.3 11/03/2022 04:18 AM    CALCIUM 9.4 11/03/2022 04:18 AM    GFRAA 53 10/13/2022 12:12 PM    GFRAA >60 06/25/2012 11:06 AM    LABGLOM 42 11/03/2022 04:18 AM    GLUCOSE 129 11/03/2022 04:18 AM    GLUCOSE 102 07/14/2016 01:03 PM     CBC:   Recent Labs     11/02/22  1929 11/03/22  0418   WBC 11.1* 12.3*   HGB 9.0* 9.2*   HCT 28.7* 29.0*   MCV 89.6 88.8    175     BNP: No results found for: BNP    ECHOCARDIOGRAM:   9/10/21   Summary   Mod conc. LVH with normal LV size & wall motion. EF is    60%. Diastolic   filling parameters suggest grade II diastolic dysfunction. The left atrium is severely dilated. Normal right ventricular size and function. Trivial mitral, aortic and tricuspid regurgitation.     Assessment:     CONSULTS:   IP CONSULT TO HOSPITALIST  IP CONSULT TO PULMONOLOGY  IP CONSULT TO CARDIOLOGY  IP CONSULT TO HEART FAILURE NURSE/COORDINATOR    Patient has a CARDIOLOGY CONSULT: Yes - follows with Dr Bobbi Duarte      Patient taking an ACEI/ARB:  Yes  - lisinopril     Patient taking a BETA BLOCKER:  Yes- toprol    SCALE AVAILABLE:  Yes - has functioning scale at home    EDUCATION STATUS: Patient and Other: and pt's spouse Linda    [x]  Provided both written and verbal education on Heart Failure signs/symptoms. [x]  Provided instructions on daily medications. [x]  Provided instructions to monitor and record weight daily. [x]  Provided instructions to call if weight increases 3 lbs in one day or 5 lbs in one week. [x]  Received verbal acknowledgment/understanding of Heart Failure related causes. [x]  Provided instructions on how to maintain a low sodium diet. []  Provided recommendations for smoking cessation programs  [x]  Provided recommendations on activity and exercise      [x]  Other:    HF RN consult noted, chart reviewed. HF RN consult noted, chart reviewed. Pt is being treated for acute on chronic diastolic HF. This is not a new diagnosis for pt. I have met with pt and her spouse, Linda, in the recent past and multiple visits prior to that. They are always receptive to education. Pt follows with Dr Bobbi Duarte (last OV 9/29/22). Cardiology is consulted and following as well as Pulmonology. I spoke with Ms Loretta Lopez and her spouse, Linda. She remembers are prior session. When asked how she is doing at home in regards to managing her HF she stated \"I am weighing myself like you told me but I do it maybe 2 to 3 times a week. \"I could do better\". I encouraged her to get back to once a day. She has a working digital scale at home \"I have a nice one from [employer]. \" I encouraged her to write them down. Pt was able to tell me the 3/5 rule but not necessarily what to do with that information. I reviewed the 3/5 rule, S&S to monitor for, and who and when to call & even gave her script of what to say.  Both pt and spouse feel her Dry Weight is 223-224 lbs. I wrote this on the white board. She is 230 lbs today. I went over the HF Zones in detail. She verbalized understanding. Pt started to doze off during our education session so I completed the rest with spouse (mostly about diet). I inquired about how she is doing with her low Na, FR diet as in the past she would eat a lot of fast food to include [employer]. Spouse states that is something she has been great with. They no longer eat out. They stopped buying all lunch meats. They eat tuna, chicken, plain burger, bananas, pears and salads and fruit. Spouse is unclear if she stays under her 2 liter FR. He did share that with her home O2 at 4 liters she does get a dry mouth. They did add a humidifier. I gave tips on how to manage a dry mouth and how to measure her 2 liters. Pt is compliant with all meds and her follow up appts. She is an active , uses [employer] in [facility] [place]. Denies any issues. They were appreciative of time spent. They are aware there will be a hospital follow up appt for HF on her dc paperwork. Bedside RN updated on above. CURRENT DIET: ADULT DIET; Regular; Low Fat/Low Chol/High Fiber/DOMENICA; Low Sodium (2 gm); 1200 ml    EDUCATIONAL PACKETS PROVIDED- PRINTED FROM [employer]. Titles and material given:  Yes   [x]  What is Heart Failure?   [x]  Heart Failure: Warning Signs of a Flare-Up  [x]  Heart Failure: Making Changes to Your Diet  [x]  Heart Failure: Medications to Help Your Heart   [x]  Other: HF Zones and Wt log sheet reviewed in detail     PATIENT/CAREGIVER TEACHING:    Level of patient/caregiver understanding able to:   [x] Verbalize understanding   [] Demonstrate understanding       [x] Teach back        [] Needs reinforcement     []  Other:      TEACHING TIME:  35 minutes       Plan:       DISCHARGE PLAN:  Placement for patient upon discharge: home with support   Hospice Care:  no  Code Status: Full Code  Discharge appointment scheduled: I have requested an appt from I    RECOMMENDATIONS:   [x]  Encourage to call Milton Allison with any questions or concerns. [x]  Educate further Ms. Perez's on fluid restriction 48 oz- 64 oz during inpatient stay so she can understand how to measure intake at home. [x]  Continue to educate on S/S of Heart Failure.   [x]  Emphasize daily weights, diet, and if changes, to call Heart Failure Resource Line  [x]  Other: Not a candidate for CR as preserved EF, declines [facility] at this time           Electronically signed by Francisco Javier Vides, RN, BSN CHFN  on 11/3/2022 at 1:49 PM

## 2022-11-03 NOTE — PROGRESS NOTES
Hospitalist Progress Note  11/3/2022 8:55 AM    PCP: Marcos Lennon MD    [id number]     Date of Admission: 11/2/2022                                                                                                                     HOSPITAL COURSE    Patient demographics:  The patient  Brianne Mccormack is a 69 y.o. female     Significant past medical history:   Patient Active Problem List   Diagnosis    Primary hypertension    Hyperlipidemia    Coronary atherosclerosis due to calcified coronary lesion of native artery    DENISE (obstructive sleep apnea)    History of DVT (deep vein thrombosis)    Family history of DVT    AAA (abdominal aortic aneurysm) without rupture    Pleural effusion, left    Pleural effusion    COPD exacerbation (HCC)    Pelvic pain in female    Vitamin D deficiency    Other emphysema (HCC)    Acute bronchitis    Acute respiratory failure with hypoxia (HCC)    Abnormal chest x-ray    Atypical pneumonia    Atrial fibrillation with RVR (HCC)    Chronic diastolic heart failure (HCC)    PVD (peripheral vascular disease) ([facility])    Abnormal nuclear stress test    AAA (abdominal aortic aneurysm)    PAF (paroxysmal atrial fibrillation) ([facility])    Endoleak post (EVAR) endovascular aneurysm repair, sequela    Carotid artery stenosis without cerebral infarction, bilateral    History of repair of aneurysm of abdominal aorta using endovascular stent graft    Atherosclerotic heart disease of native coronary artery with other forms of angina pectoris (HCC)    Morbidly obese (HCC)    COPD, severe ([address]    Hypertensive crisis    Acute on chronic diastolic congestive heart failure (HCC)    Respiratory failure (HCC)    Left atrial flutter by electrocardiogram ([address]    Persistent atrial fibrillation (HCC)    A-fib (HCC)    Obesity (BMI 30-39. 9)    Atrial fibrillation (HCC)    SOB (shortness of breath)    Chronic systolic (congestive) heart failure    Pneumonia    Acute on chronic respiratory failure with hypoxia ([address] Presenting symptoms:      Diagnostic workup:      CONSULTS DURING ADMISSION :   IP CONSULT TO HOSPITALIST  IP CONSULT TO PULMONOLOGY  IP CONSULT TO CARDIOLOGY  IP CONSULT TO HEART FAILURE NURSE/COORDINATOR      Patient was diagnosed with:        Treatment while inpatient:  69years old female with medical history significant for atrial fibrillation coronary artery disease CHF severe COPD hypertension. Patient presented to the emergency room with shortness of breath. Patient has baseline chronic respiratory failure with 4 L nasal cannula oxygen. Patient was diagnosed with acute on chronic respiratory failure due to COPD exacerbation.                                                                                  ----------------------------------------------------------      SUBJECTIVE COMPLAINTS-     Diet: ADULT DIET;  Regular; Low Fat/Low Chol/High Fiber/DOMENICA; Low Sodium (2 gm); 1200 ml      OBJECTIVE:   Patient Active Problem List   Diagnosis    Primary hypertension    Hyperlipidemia    Coronary atherosclerosis due to calcified coronary lesion of native artery    DENISE (obstructive sleep apnea)    History of DVT (deep vein thrombosis)    Family history of DVT    AAA (abdominal aortic aneurysm) without rupture    Pleural effusion, left    Pleural effusion    COPD exacerbation ([facility])    Pelvic pain in female    Vitamin D deficiency    Other emphysema ([facility])    Acute bronchitis    Acute respiratory failure with hypoxia ([facility])    Abnormal chest x-ray    Atypical pneumonia    Atrial fibrillation with RVR ([facility])    Chronic diastolic heart failure ([facility])    PVD (peripheral vascular disease) ([facility])    Abnormal nuclear stress test    AAA (abdominal aortic aneurysm)    PAF (paroxysmal atrial fibrillation) ([facility])    Endoleak post (EVAR) endovascular aneurysm repair, sequela    Carotid artery stenosis without cerebral infarction, bilateral    History of repair of aneurysm of abdominal aorta using endovascular stent graft Atherosclerotic heart disease of native coronary artery with other forms of angina pectoris ([facility]    Morbidly obese (HCC)    COPD[facility]    Hypertensive crisis    Acute on chronic diastolic congestive heart failure (HCC)    Respiratory failure (HCC)    Left atrial flutter by electrocardiogram ([facility]    Persistent atrial fibrillation (HCC)    A-fib (HCC)    Obesity (BMI 30-39. 9)    Atrial fibrillation (HCC)    SOB (shortness of breath)    Chronic systolic (congestive) heart failure    Pneumonia    Acute on chronic respiratory failure with hypoxia ([facility])       Allergies  Morphine    Medications    Scheduled Meds:   sodium chloride flush  10 mL IntraVENous 2 times per day    ipratropium-albuterol  1 ampule Inhalation Q4H WA    clopidogrel  75 mg Oral Daily    dofetilide  250 mcg Oral Q12H    isosorbide mononitrate  30 mg Oral Daily    metoprolol succinate  25 mg Oral Daily    lisinopril  20 mg Oral Daily    rivaroxaban  20 mg Oral Daily with breakfast    rosuvastatin  20 mg Oral Daily    mometasone-formoterol  2 puff Inhalation BID    aspirin EC  81 mg Oral Daily    methylPREDNISolone  40 mg IntraVENous Q6H    hydrALAZINE  10 mg Oral 3 times per day    furosemide  40 mg Oral BID     Continuous Infusions:   sodium chloride       PRN Meds:  sodium chloride flush, sodium chloride, magnesium hydroxide, acetaminophen **OR** acetaminophen, albuterol, traZODone    Vitals   Vitals /wt Patient Vitals for the past 8 hrs:   BP Temp Temp src Pulse Resp SpO2 Weight   11/03/22 0806 -- -- -- 89 19 -- --   11/03/22 0800 (!) 155/83 97 °F (36.1 °C) Oral 78 29 93 % --   11/03/22 0756 -- -- -- 87 29 98 % --   11/03/22 0645 (!) 169/83 -- -- 80 (!) 33 -- --   11/03/22 0614 (!) 207/74 -- -- 67 26 -- --   11/03/22 0510 -- -- -- -- -- -- 230 lb 6.1 oz (104.5 kg)   11/03/22 0325 (!) 174/84 98.5 °F (36.9 °C) Oral 67 26 -- --   11/03/22 0318 -- 98.5 °F (36.9 °C) Oral 76 (!) 32 96 % --        72HR INTAKE/OUTPUT:    Intake/Output Summary (Last 24 hours) at 11/3/2022 0855  Last data filed at 11/3/2022 0849  Gross per 24 hour   Intake 240 ml   Output 1150 ml   Net -910 ml       Exam:    Gen:   Alert and oriented ×3    Eyes: PERRL. No sclera icterus. No conjunctival injection. ENT: No discharge. Pharynx clear. External appearance of ears and nose normal.  Neck: Trachea midline. No obvious mass. Resp: No accessory muscle use. No crackles. No wheezes. No rhonchi. CV: Regular rate. Regular rhythm. No murmur or rub. No edema. GI: Non-tender. Non-distended. No hernia. Skin: Warm, dry, normal texture and turgor. Lymph: No cervical LAD. No supraclavicular LAD. M/S: / Ext. No cyanosis. No clubbing. No joint deformity. Neuro: CN 2-12 are intact,  no neurologic deficits noted. PT/INR:   Recent Labs     11/03/22 0418   PROTIME 16.4*   INR 1.33*     APTT: No results for input(s): APTT in the last 72 hours. CBC:   Recent Labs     11/02/22 1929 11/03/22 0418   WBC 11.1* 12.3*   HGB 9.0* 9.2*   HCT 28.7* 29.0*   MCV 89.6 88.8    175       BMP:   Recent Labs     11/02/22 1929 11/03/22 0418    140   K 4.2 4.3   CL 94* 97*   CO2 39* 35*   BUN 21* 23*   CREATININE 1.2 1.3*       LIVER PROFILE:   Recent Labs     11/02/22 1929   ALKPHOS 64   AST 15   ALT 9*   BILITOT 0.3     No results for input(s): AMYLASE in the last 72 hours. No results for input(s): LIPASE in the last 72 hours. UA:  Recent Labs     11/02/22 1929   WBCUA 9*   RBCUA 2       TROPONIN:   Recent Labs     11/02/22 1929 11/03/22 0418 11/03/22 0702   TROPONINI 0.04* 0.04* 0.03*       Lab Results   Component Value Date/Time    URRFLXCULT Not Indicated 11/02/2022 07:29 PM       No results for input(s): TSHREFLEX in the last 72 hours. No components found for: [id number]  POC GLUCOSE:  No results for input(s): POCGLU in the last 72 hours. No results for input(s): LABA1C in the last 72 hours.    Lab Results   Component Value Date/Time    LABA1C 5.6 05/23/2019 02:38 PM         ASSESSMENT AND PLAN  Acute on chronic respiratory failure. - with increased work of breath, tachypnea and hypoxia  - home 4 liters saturation 67%  -Titrate oxygen for saturations greater than or equal to 90%    COPD Exacerbation J44.1  Steroids and bronchodilators and antibiotics  bronchial higiene  Consult to pulmonary     Chronic diastolic CHF    -Appears to be compensated. - last ECHO: 9/22   - bnp 3858  - cont lasix, BB, ACEi  - daily wts and I/Os  - consult CHF RN  - consult cardiology last visit 9/29/22 (has appt tomorrow 11/3)     CAD with PCI and JORJE 9/20/2022  - Successful PCI of proximal Lcx with DESx2  - continue bb, acei, imdur, hydralazine and nitro prn  - continue asa, plavix and statin     Atrial Fibrillation   - currently rate controlled  - continue Xarelto and dofetilide      Essential (primary) hypertension   - monitor blood pressure  - continue home meds     Code Status: Full Code        Dispo - cc        The patient and / or the family were informed of the results of any tests, a time was given to answer questions, a plan was proposed and they agreed with plan. Shiloh Reid MD    This note was transcribed using [id number] [employer]. Please disregard any translational errors.

## 2022-11-03 NOTE — ED PROVIDER NOTES
I independently performed a history and physical on [facility]. All diagnostic, treatment, and disposition decisions were made by myself in conjunction with the advanced practice provider. For further details of Jannie Koenig emergency department encounter, please see GITA Kimble's documentation. Patient complains of worsening shortness of breath over the last several days. She states she will get short of breath just getting from the bed to the bedside commode and back again. She is on oxygen with 4 L nasal cannula at baseline. She uses an oxygen concentrator that she believes may not be working so well. She denies any chest pain or fevers to me. On exam she does have scattered rales and rhonchi and heart is regular rate and rhythm. She also has some peripheral edema noted. EKG  The Ekg interpreted by me shows  normal sinus rhythm with a rate of 67  Axis is   Normal  QTc is  normal  Intervals and Durations are unremarkable.       ST Segments: normal  No significant change from prior EKG dated 9/29/22    Labs Reviewed   WET PREP, GENITAL - Abnormal; Notable for the following components:       Result Value    Clue Cells, Wet Prep <1+ (*)     All other components within normal limits   URINALYSIS WITH REFLEX TO CULTURE - Abnormal; Notable for the following components:    Protein, UA TRACE (*)     Leukocyte Esterase, Urine SMALL (*)     All other components within normal limits   CBC WITH AUTO DIFFERENTIAL - Abnormal; Notable for the following components:    WBC 11.1 (*)     RBC 3.20 (*)     Hemoglobin 9.0 (*)     Hematocrit 28.7 (*)     RDW 15.9 (*)     Neutrophils Absolute 8.8 (*)     All other components within normal limits   COMPREHENSIVE METABOLIC PANEL - Abnormal; Notable for the following components:    Chloride 94 (*)     CO2 39 (*)     Glucose 122 (*)     BUN 21 (*)     Est, Glom Filt Rate 47 (*)     Total Protein 6.3 (*)     ALT 9 (*)     All other components within normal limits TROPONIN - Abnormal; Notable for the following components:    Troponin 0.04 (*)     All other components within normal limits   BRAIN NATRIURETIC PEPTIDE - Abnormal; Notable for the following components:    Pro-BNP 3,858 (*)     All other components within normal limits   MICROSCOPIC URINALYSIS - Abnormal; Notable for the following components:    WBC, UA 9 (*)     All other components within normal limits   COVID-19, RAPID   RAPID INFLUENZA A/B ANTIGENS     XR CHEST PORTABLE   Final Result   Mild pulmonary vascular congestion. Stable mild cardiomegaly. I personally saw this patient and performed a substantive portion of the visit including all aspects of the medical decision making. MDM  Patient's symptoms and laboratory and radiologic evaluation are most consistent with CHF exacerbation. We are admitting her to the hospital for further treatment and evaluation given her significant dyspnea and oxygen requirement. I personally saw this patient and independently provided 20 minutes of non-concurrent critical care out of the total shared critical care time provided.         Yelitza Neely MD  11/02/22 1986

## 2022-11-03 NOTE — ACP (ADVANCE CARE PLANNING)
Advance Care Planning     Advance Care Planning Activator (Inpatient)  Conversation Note      Date of ACP Conversation: 11/3/2022     Conversation Conducted with: Patient with Decision Making Capacity    ACP Activator: Lizbeth Ayoub RN    Health Care Decision Maker:     Current Designated Health Care Decision Maker:     Primary Decision Maker: Juarez Adrian Spouse - [phone]    Secondary Decision Maker: Nic Link Child - [phone]    Care Preferences    Ventilation: \"If you were in your present state of health and suddenly became very ill and were unable to breathe on your own, what would your preference be about the use of a ventilator (breathing machine) if it were available to you? \"      Would the patient desire the use of ventilator (breathing machine)?: yes    \"If your health worsens and it becomes clear that your chance of recovery is unlikely, what would your preference be about the use of a ventilator (breathing machine) if it were available to you? \"     Would the patient desire the use of ventilator (breathing machine)?: No      Resuscitation  \"CPR works best to restart the heart when there is a sudden event, like a heart attack, in someone who is otherwise healthy. Unfortunately, CPR does not typically restart the heart for people who have serious health conditions or who are very sick. \"    \"In the event your heart stopped as a result of an underlying serious health condition, would you want attempts to be made to restart your heart (answer \"yes\" for attempt to resuscitate) or would you prefer a natural death (answer \"no\" for do not attempt to resuscitate)? \" yes       [] Yes   [x] No   Educated Patient / Pierce Palumbo regarding differences between Advance Directives and portable DNR orders.     Length of ACP Conversation in minutes:  5 minutes    Conversation Outcomes:  [x] ACP discussion completed  [] Existing advance directive reviewed with patient; no changes to patient's previously recorded wishes  [] New Advance Directive completed  [] Portable Do Not Rescitate prepared for Provider review and signature  [] POLST/POST/MOLST/MOST prepared for Provider review and signature      Follow-up plan:    [] Schedule follow-up conversation to continue planning  [] Referred individual to Provider for additional questions/concerns   [] Advised patient/agent/surrogate to review completed ACP document and update if needed with changes in condition, patient preferences or care setting    [x] This note routed to one or more involved healthcare providers  Electronically signed by Elin River RN Case Management on 11/3/2022 at 3:48 PM

## 2022-11-03 NOTE — PROGRESS NOTES
4 Eyes Skin Assessment     NAME:  Leno Palmer  YOB: 1945  MEDICAL RECORD NUMBER:  [id number]    The patient is being assess for  Admission    I agree that 2 RN's have performed a thorough Head to Toe Skin Assessment on the patient. ALL assessment sites listed below have been assessed. Areas assessed by both nurses:    Head, Face, Ears, Shoulders, Back, Chest, Arms, Elbows, Hands, Sacrum. Buttock, Coccyx, Ischium, and Legs. Feet and Heels        Does the Patient have a Wound? Pt has scattered abrasions.   No pressure wounds       Braydon Prevention initiated:  Yes   Wound Care Orders initiated:  NA    Pressure Injury (Stage 3,4, Unstageable, DTI, NWPT, and Complex wounds) if present place referral/consult order under [de-identified] NA    New and Established Ostomies if present place consult order under : NA      Nurse 1 eSignature: Electronically signed by Salud Coker RN on 11/3/22 at 1:55 AM EDT    **SHARE this note so that the co-signing nurse is able to place an eSignature**    Nurse 2 eSignature: Electronically signed by Sabina Kumar RN on 11/3/22 at 3:57 AM EDT

## 2022-11-03 NOTE — TELEPHONE ENCOUNTER
Remberto Putnam has been readmitted with HF. Dr. Saurabh Quiroga has spoke to family and pt and they would like to proceed with Cardiomems as previously discussed. Do we need to submit PA? If not, can we get scheduled as OP?   Thx

## 2022-11-03 NOTE — CONSULTS
[address] [address]                                  CONSULTATION    PATIENT NAME: Anna Starr                     :        1945  MED REC NO:   [id number]                          ROOM:       5102  ACCOUNT NO:   [de-identified]                           ADMIT DATE: 2022  PROVIDER:     Tyree Bruno MD    CONSULT DATE:  2022    CARDIAC CONSULTATION NOTE    HISTORY OF PRESENT ILLNESS:  This is a 81-year-old female very well  known to me from before with a history of paroxysmal atrial  fibrillation, status post multiple ablation, CAD, recurrent CHF, COPD,  hypertension, hyperlipidemia came to the hospital with symptoms suddenly  increased shortness of breath. She is on chronic 4 L of oxygen, but her  oxygen level dropped down to 60% with this increased shortness of  breath. She also at that time had a hard blood pressure going into 300  range per patient. Since her oxygen level did not improve, she was  brought to the hospital.  She is being admitted for acute on chronic  hypoxic respiratory failure. She denies any chest tightness or  pressure. One night she had episodes of rapid atrial fibrillation. She  had no swelling in her lower extremities. PAST MEDICAL HISTORY:  Include:  1. History of CAD, which showed 100% occlusion of the right coronary. Had recent stent in the proximal circumflex artery in 2022  artery with left to right collaterals. 2.  Hypertension. 3.  Paroxysmal atrial fibrillation. She is status post multiple  ablations. She is on Tikosyn therapy per EP. 4.  Severe COPD seen by Pulmonary. 5.  Hypertension. 6.  Hyperlipidemia. PAST SURGICAL HISTORY:  1. Aortic aneurysm repair. 2.  Appendectomy. 3.  Coronary angiography. 4.  Hysterectomy. 5.  Right knee replacement. 6.  Tonsillectomy. 7.  Bladder suspension.     FAMILY HISTORY:  Father had a heart disease as well as hypertension. SOCIAL HISTORY:  She has a history of longstanding history of smoking,  though quit in 2013. No history of alcohol abuse. MEDICATIONS:  Have been reviewed. ALLERGIES:  Have been reviewed. REVIEW OF SYSTEMS:  Please see HPI. All other systems are reviewed and  they are negative. PHYSICAL EXAMINATION:  CONSTITUTIONAL:  She is alert and oriented x4. VITAL SIGNS:  Her pulse is 59, blood pressure 155/83, respirations 22,  oxygen saturation 98%. HEENT:  Eyes show no pale conjunctivae . NECK:  Supple. I am unable to appreciate jugular venous distention. No  carotid bruits. No thyromegaly. No lymphadenopathy. CHEST:  Lungs examination reveals bibasilar crackles. CARDIOVASCULAR:  Reveals normal S1 and 2. There is a very soft 1 to 2/6  systolic murmur at the base. ABDOMEN:  Soft and nontender. Bowel sounds are present. EXTREMITIES:  Show trace edema. NEUROLOGIC:  Alert and oriented. Cranial nerves II through XII intact. No focal deficits. SKIN:  Shows no rashes. LABORATORY DATA:  Sodium is 140, potassium 4.6, chloride 97, bicarb 35,  BUN 23, creatinine 1.3. ProBNP was 3858. Troponins have been  marginally elevated at 0.03 to 0.04. Her white count is 16.1,  hemoglobin 8, hematocrit is 24.2, platelet count 13,319. Her INR is  3.56. Liver functions are within normal limits. The CBC showed her hemoglobin at 9.2, hematocrit is 29, white count is  12.3. Chest x-ray showed evidence of mid pulmonary vascular congestion. Her echocardiogram, last one is from 09/10/2021, which showed normal LV  function. Her monitor was personally reviewed showed episodes of runs atrial  fibrillation with rapid ventricular rate. IMPRESSION:  1. This is a 44-year-old female who has presented to the hospital with  increasing shortness of breath has evidence of acute on chronic hypoxic  respiratory failure.   This is due to combination of her underlying  severe COPD plus pulmonary vascular congestion. 2.  Acute-on-chronic diastolic heart failure due to significant  hypertension and also recurrent atrial fibrillation. 3.  Hypertension. Blood pressure was poorly controlled and is still  elevated. 4.  Paroxysmal atrial fibrillation on Tikosyn therapy and recurrent runs  of atrial fibrillation, may be due to fluid overload state. 5.  Coronary artery disease. No evidence of chest pain, mildly elevated  troponin, which may be related to her heart failure, suddenly  progressive atherosclerotic heart disease may have to be considered, if  she continues to have recurrent symptoms of heart failure. RECOMMENDATIONS:  1.  I will place the patient on IV Lasix, follow I's and O's, daily  weight, electrolytes closely. 2.  We will follow her rhythm closely and hope her atrial fibrillation improves with improved dialysis. 3.  We will increase her hydralazine to 25 t.i.d. for better blood  pressure control. 4.  We will repeat her echocardiogram to make sure there are no  additional wall motion abnormalities. 5.  We will consider the patient for CardioMEMS in outpatient setting and Rosemary Hamlin our nurse practitioner has been notified about this. 6.  We will consider the patient for SGLT2 inhibitor at the time of the  discharge. I appreciate the opportunity to participate in care of this pleasant  female.     With warm regards,        Carole Gamez MD    D: 11/03/2022 12:07:55       T: 11/03/2022 16:42:21     [email or web address]  Job#: [id number]     Doc#: [id number]    CC:

## 2022-11-03 NOTE — ED NOTES
Report called to Rosalinda Ladd, RN. Opportunity to answer questions. VSS.  IV saline boris C, D, and Raffi Gonzalez RN  11/03/22 0001

## 2022-11-03 NOTE — CARE COORDINATION
INITIAL CASE MANAGEMENT ASSESSMENT    Reviewed chart, met with patient &  to assess possible discharge needs. Explained Case Management role/services. Living Situation: Confirmed address, lives with  in a mobile home, 1 step to enter    ADLs: Patient able to ambulate with walker,  assists with dressing/bathing if needed.  completes homemaking duties. DME: Wheeled walker, oxygen -- has portable [employer] up to 5L     PT/OT Recs: Not ordered     Active Services: None     Transportation: Patient is able to drive but  usually transports PRN     Medications: Uses [facility] in [place] -- no issues    PCP: Marianne Herrera MD      HD/PD: n/a    PLAN/COMMENTS: Patient will return home with . Denies home needs. Doesn't think she needs home care but open to using [address] again if needed. Referral made. Watch for needs. SW/CM provided contact information for patient or family to call with any questions. SW/CM will follow and assist as needed.     Electronically signed by Tejal Bronson RN Case Management [phone] on 11/3/2022 at 3:47 PM

## 2022-11-03 NOTE — PROGRESS NOTES
Medication Reconciliation    List of medications patient is currently taking is complete. Source of information: 1. Conversation with patient and family at bedside                                      2. EPIC records      Allergies  Morphine     Notes regarding home medications:   1. Patient no longer on Aspirin, trazodone, or ranolazine. Removed from list.  2. Furosemide decreased to once daily  3. Hydralazine changed to 1/2 tab of the 25 mg strength TID.

## 2022-11-03 NOTE — PROGRESS NOTES
Pt transferred from ED to 5102. Monitor applied. MW notified. Oriented patient to room, call light, hosp policy. Pt expressed understanding, denied questions denied pain.

## 2022-11-04 ENCOUNTER — APPOINTMENT (OUTPATIENT)
Dept: CARDIAC CATH/INVASIVE PROCEDURES | Age: 77
DRG: 280 | End: 2022-11-04
Payer: MEDICARE

## 2022-11-04 LAB
ANION GAP SERPL CALCULATED.3IONS-SCNC: 10 MMOL/L (ref 3–16)
BASOPHILS ABSOLUTE: 0 K/UL (ref 0–0.2)
BASOPHILS RELATIVE PERCENT: 0.2 %
BUN BLDV-MCNC: 37 MG/DL (ref 7–20)
CALCIUM SERPL-MCNC: 9 MG/DL (ref 8.3–10.6)
CHLORIDE BLD-SCNC: 92 MMOL/L (ref 99–110)
CO2: 37 MMOL/L (ref 21–32)
CREAT SERPL-MCNC: 1.3 MG/DL (ref 0.6–1.2)
EKG ATRIAL RATE: 76 BPM
EKG DIAGNOSIS: NORMAL
EKG P AXIS: 83 DEGREES
EKG P-R INTERVAL: 176 MS
EKG Q-T INTERVAL: 414 MS
EKG QRS DURATION: 90 MS
EKG QTC CALCULATION (BAZETT): 465 MS
EKG R AXIS: -1 DEGREES
EKG T AXIS: 64 DEGREES
EKG VENTRICULAR RATE: 76 BPM
EOSINOPHILS ABSOLUTE: 0 K/UL (ref 0–0.6)
EOSINOPHILS RELATIVE PERCENT: 0.3 %
GFR SERPL CREATININE-BSD FRML MDRD: 42 ML/MIN/{1.73_M2}
GLUCOSE BLD-MCNC: 101 MG/DL (ref 70–99)
GLUCOSE BLD-MCNC: 151 MG/DL (ref 70–99)
HCT VFR BLD CALC: 30 % (ref 36–48)
HEMOGLOBIN: 9.3 G/DL (ref 12–16)
INR BLD: 2.45 (ref 0.87–1.14)
LYMPHOCYTES ABSOLUTE: 1 K/UL (ref 1–5.1)
LYMPHOCYTES RELATIVE PERCENT: 6.9 %
MCH RBC QN AUTO: 28 PG (ref 26–34)
MCHC RBC AUTO-ENTMCNC: 31.1 G/DL (ref 31–36)
MCV RBC AUTO: 90.1 FL (ref 80–100)
MONOCYTES ABSOLUTE: 1.1 K/UL (ref 0–1.3)
MONOCYTES RELATIVE PERCENT: 7.3 %
NEUTROPHILS ABSOLUTE: 12.8 K/UL (ref 1.7–7.7)
NEUTROPHILS RELATIVE PERCENT: 85.3 %
PDW BLD-RTO: 16.9 % (ref 12.4–15.4)
PERFORMED ON: ABNORMAL
PLATELET # BLD: 182 K/UL (ref 135–450)
PMV BLD AUTO: 9.6 FL (ref 5–10.5)
POTASSIUM REFLEX MAGNESIUM: 4.5 MMOL/L (ref 3.5–5.1)
PRO-BNP: 5478 PG/ML (ref 0–449)
PROTHROMBIN TIME: 26.7 SEC (ref 11.7–14.5)
RBC # BLD: 3.33 M/UL (ref 4–5.2)
REASON FOR REJECTION: NORMAL
REJECTED TEST: NORMAL
SODIUM BLD-SCNC: 139 MMOL/L (ref 136–145)
WBC # BLD: 15 K/UL (ref 4–11)

## 2022-11-04 PROCEDURE — 6370000000 HC RX 637 (ALT 250 FOR IP): Performed by: NURSE PRACTITIONER

## 2022-11-04 PROCEDURE — C1751 CATH, INF, PER/CENT/MIDLINE: HCPCS

## 2022-11-04 PROCEDURE — 83880 ASSAY OF NATRIURETIC PEPTIDE: CPT

## 2022-11-04 PROCEDURE — 80048 BASIC METABOLIC PNL TOTAL CA: CPT

## 2022-11-04 PROCEDURE — 93451 RIGHT HEART CATH: CPT

## 2022-11-04 PROCEDURE — 4A023N6 MEASUREMENT OF CARDIAC SAMPLING AND PRESSURE, RIGHT HEART, PERCUTANEOUS APPROACH: ICD-10-PCS | Performed by: STUDENT IN AN ORGANIZED HEALTH CARE EDUCATION/TRAINING PROGRAM

## 2022-11-04 PROCEDURE — 2580000003 HC RX 258: Performed by: NURSE PRACTITIONER

## 2022-11-04 PROCEDURE — 36415 COLL VENOUS BLD VENIPUNCTURE: CPT

## 2022-11-04 PROCEDURE — 93005 ELECTROCARDIOGRAM TRACING: CPT | Performed by: HOSPITALIST

## 2022-11-04 PROCEDURE — 2580000003 HC RX 258: Performed by: INTERNAL MEDICINE

## 2022-11-04 PROCEDURE — 94760 N-INVAS EAR/PLS OXIMETRY 1: CPT

## 2022-11-04 PROCEDURE — 99233 SBSQ HOSP IP/OBS HIGH 50: CPT | Performed by: INTERNAL MEDICINE

## 2022-11-04 PROCEDURE — 6370000000 HC RX 637 (ALT 250 FOR IP): Performed by: INTERNAL MEDICINE

## 2022-11-04 PROCEDURE — 94640 AIRWAY INHALATION TREATMENT: CPT

## 2022-11-04 PROCEDURE — 93005 ELECTROCARDIOGRAM TRACING: CPT

## 2022-11-04 PROCEDURE — 2700000000 HC OXYGEN THERAPY PER DAY

## 2022-11-04 PROCEDURE — 85610 PROTHROMBIN TIME: CPT

## 2022-11-04 PROCEDURE — 2500000003 HC RX 250 WO HCPCS: Performed by: INTERNAL MEDICINE

## 2022-11-04 PROCEDURE — 2580000003 HC RX 258

## 2022-11-04 PROCEDURE — 6360000002 HC RX W HCPCS: Performed by: HOSPITALIST

## 2022-11-04 PROCEDURE — 93010 ELECTROCARDIOGRAM REPORT: CPT | Performed by: INTERNAL MEDICINE

## 2022-11-04 PROCEDURE — 85025 COMPLETE CBC W/AUTO DIFF WBC: CPT

## 2022-11-04 PROCEDURE — 2709999900 HC NON-CHARGEABLE SUPPLY

## 2022-11-04 PROCEDURE — 6360000002 HC RX W HCPCS: Performed by: INTERNAL MEDICINE

## 2022-11-04 PROCEDURE — C1894 INTRO/SHEATH, NON-LASER: HCPCS

## 2022-11-04 PROCEDURE — 2500000003 HC RX 250 WO HCPCS

## 2022-11-04 PROCEDURE — 2060000000 HC ICU INTERMEDIATE R&B

## 2022-11-04 PROCEDURE — 6370000000 HC RX 637 (ALT 250 FOR IP): Performed by: HOSPITALIST

## 2022-11-04 RX ORDER — LORAZEPAM 0.5 MG/1
0.5 TABLET ORAL
Status: CANCELLED | OUTPATIENT
Start: 2022-11-04 | End: 2022-11-05

## 2022-11-04 RX ORDER — SODIUM CHLORIDE 9 MG/ML
INJECTION, SOLUTION INTRAVENOUS PRN
Status: CANCELLED | OUTPATIENT
Start: 2022-11-04

## 2022-11-04 RX ORDER — SODIUM CHLORIDE 0.9 % (FLUSH) 0.9 %
5-40 SYRINGE (ML) INJECTION PRN
Status: CANCELLED | OUTPATIENT
Start: 2022-11-04

## 2022-11-04 RX ORDER — HYDRALAZINE HYDROCHLORIDE 50 MG/1
50 TABLET, FILM COATED ORAL EVERY 8 HOURS SCHEDULED
Status: DISCONTINUED | OUTPATIENT
Start: 2022-11-04 | End: 2022-11-08 | Stop reason: HOSPADM

## 2022-11-04 RX ORDER — METOPROLOL SUCCINATE 50 MG/1
50 TABLET, EXTENDED RELEASE ORAL DAILY
Status: DISCONTINUED | OUTPATIENT
Start: 2022-11-05 | End: 2022-11-04

## 2022-11-04 RX ORDER — HYDRALAZINE HYDROCHLORIDE 20 MG/ML
10 INJECTION INTRAMUSCULAR; INTRAVENOUS EVERY 4 HOURS PRN
Status: DISCONTINUED | OUTPATIENT
Start: 2022-11-04 | End: 2022-11-08 | Stop reason: HOSPADM

## 2022-11-04 RX ORDER — DILTIAZEM HYDROCHLORIDE 5 MG/ML
25 INJECTION INTRAVENOUS ONCE
Status: COMPLETED | OUTPATIENT
Start: 2022-11-04 | End: 2022-11-04

## 2022-11-04 RX ORDER — METOPROLOL SUCCINATE 50 MG/1
50 TABLET, EXTENDED RELEASE ORAL DAILY
Status: DISCONTINUED | OUTPATIENT
Start: 2022-11-05 | End: 2022-11-07

## 2022-11-04 RX ORDER — SODIUM CHLORIDE 0.9 % (FLUSH) 0.9 %
5-40 SYRINGE (ML) INJECTION EVERY 12 HOURS SCHEDULED
Status: CANCELLED | OUTPATIENT
Start: 2022-11-04

## 2022-11-04 RX ORDER — METOPROLOL SUCCINATE 25 MG/1
25 TABLET, EXTENDED RELEASE ORAL DAILY
Status: DISCONTINUED | OUTPATIENT
Start: 2022-11-05 | End: 2022-11-04

## 2022-11-04 RX ORDER — METOPROLOL SUCCINATE 25 MG/1
25 TABLET, EXTENDED RELEASE ORAL ONCE
Status: COMPLETED | OUTPATIENT
Start: 2022-11-04 | End: 2022-11-04

## 2022-11-04 RX ORDER — LANOLIN ALCOHOL/MO/W.PET/CERES
9 CREAM (GRAM) TOPICAL NIGHTLY PRN
Status: DISCONTINUED | OUTPATIENT
Start: 2022-11-04 | End: 2022-11-08 | Stop reason: HOSPADM

## 2022-11-04 RX ORDER — PREDNISONE 20 MG/1
20 TABLET ORAL DAILY
Status: COMPLETED | OUTPATIENT
Start: 2022-11-05 | End: 2022-11-08

## 2022-11-04 RX ADMIN — IPRATROPIUM BROMIDE AND ALBUTEROL SULFATE 1 AMPULE: .5; 3 SOLUTION RESPIRATORY (INHALATION) at 20:32

## 2022-11-04 RX ADMIN — DILTIAZEM HYDROCHLORIDE 10 MG/HR: 5 INJECTION, SOLUTION INTRAVENOUS at 19:38

## 2022-11-04 RX ADMIN — DILTIAZEM HYDROCHLORIDE 25 MG: 5 INJECTION INTRAVENOUS at 19:16

## 2022-11-04 RX ADMIN — MOMETASONE FUROATE AND FORMOTEROL FUMARATE DIHYDRATE 2 PUFF: 200; 5 AEROSOL RESPIRATORY (INHALATION) at 20:32

## 2022-11-04 RX ADMIN — ISOSORBIDE MONONITRATE 30 MG: 30 TABLET, EXTENDED RELEASE ORAL at 08:12

## 2022-11-04 RX ADMIN — IPRATROPIUM BROMIDE AND ALBUTEROL SULFATE 1 AMPULE: .5; 3 SOLUTION RESPIRATORY (INHALATION) at 15:51

## 2022-11-04 RX ADMIN — LISINOPRIL 20 MG: 20 TABLET ORAL at 08:12

## 2022-11-04 RX ADMIN — Medication 9 MG: at 23:43

## 2022-11-04 RX ADMIN — PREDNISONE 40 MG: 20 TABLET ORAL at 08:13

## 2022-11-04 RX ADMIN — RIVAROXABAN 20 MG: 20 TABLET, FILM COATED ORAL at 08:12

## 2022-11-04 RX ADMIN — SODIUM CHLORIDE, PRESERVATIVE FREE 10 ML: 5 INJECTION INTRAVENOUS at 08:13

## 2022-11-04 RX ADMIN — SODIUM CHLORIDE, PRESERVATIVE FREE 10 ML: 5 INJECTION INTRAVENOUS at 21:14

## 2022-11-04 RX ADMIN — FUROSEMIDE 40 MG: 10 INJECTION, SOLUTION INTRAMUSCULAR; INTRAVENOUS at 08:12

## 2022-11-04 RX ADMIN — MOMETASONE FUROATE AND FORMOTEROL FUMARATE DIHYDRATE 2 PUFF: 200; 5 AEROSOL RESPIRATORY (INHALATION) at 08:30

## 2022-11-04 RX ADMIN — ROSUVASTATIN CALCIUM 20 MG: 20 TABLET, FILM COATED ORAL at 08:12

## 2022-11-04 RX ADMIN — METOPROLOL SUCCINATE 25 MG: 25 TABLET, FILM COATED, EXTENDED RELEASE ORAL at 08:12

## 2022-11-04 RX ADMIN — CLOPIDOGREL BISULFATE 75 MG: 75 TABLET ORAL at 08:13

## 2022-11-04 RX ADMIN — HYDRALAZINE HYDROCHLORIDE 25 MG: 25 TABLET, FILM COATED ORAL at 05:39

## 2022-11-04 RX ADMIN — METOPROLOL SUCCINATE 25 MG: 25 TABLET, FILM COATED, EXTENDED RELEASE ORAL at 11:14

## 2022-11-04 RX ADMIN — HYDRALAZINE HYDROCHLORIDE 10 MG: 20 INJECTION INTRAMUSCULAR; INTRAVENOUS at 18:17

## 2022-11-04 RX ADMIN — FUROSEMIDE 40 MG: 10 INJECTION, SOLUTION INTRAMUSCULAR; INTRAVENOUS at 17:05

## 2022-11-04 RX ADMIN — HYDRALAZINE HYDROCHLORIDE 50 MG: 50 TABLET, FILM COATED ORAL at 21:11

## 2022-11-04 RX ADMIN — DOFETILIDE 250 MCG: 0.25 CAPSULE ORAL at 21:11

## 2022-11-04 RX ADMIN — IPRATROPIUM BROMIDE AND ALBUTEROL SULFATE 1 AMPULE: .5; 3 SOLUTION RESPIRATORY (INHALATION) at 08:20

## 2022-11-04 RX ADMIN — DOFETILIDE 250 MCG: 0.25 CAPSULE ORAL at 08:19

## 2022-11-04 RX ADMIN — HYDRALAZINE HYDROCHLORIDE 50 MG: 50 TABLET, FILM COATED ORAL at 14:38

## 2022-11-04 NOTE — PROGRESS NOTES
Pulmonary Progress Note    CC:  Follow up COPD, hypoxia    Subjective:  Diuresing well and thinks her breathing is improving due to diuersis  Not much help with nebs. Wired and unable to sleep due to steroids  On 4 liters of oxygen  Afebrile     ROS  Improved SOB  Insomnia from steroids       Intake/Output Summary (Last 24 hours) at 11/4/2022 0718  Last data filed at 11/3/2022 2155  Gross per 24 hour   Intake 240 ml   Output 2725 ml   Net -2485 ml         PHYSICAL EXAM:  Blood pressure (!) 154/85, pulse 71, temperature 97.9 °F (36.6 °C), temperature source Oral, resp. rate 25, height 5' 4\" (1.626 m), weight 230 lb 6.1 oz (104.5 kg), SpO2 100 %, not currently breastfeeding.'  Gen: No distress. Eyes: PERRL. No sclera icterus. No conjunctival injection. ENT: No discharge. Pharynx clear. External appearance of ears and nose normal.  Neck: Trachea midline. No obvious mass. Resp: Diminished. CV: Irregular. No murmur or rub. GI: Non-tender. Non-distended. No hernia. Skin: Warm, dry, normal texture and turgor. No nodule on exposed extremities. Lymph: No cervical LAD. No supraclavicular LAD. M/S: No cyanosis. No clubbing. No joint deformity. Neuro: Moves all four extremities. CN 2-12 tested, no defect noted.   Ext:   trace edema    Medications:    Scheduled Meds:   sodium chloride flush  10 mL IntraVENous 2 times per day    ipratropium-albuterol  1 ampule Inhalation Q4H WA    clopidogrel  75 mg Oral Daily    dofetilide  250 mcg Oral Q12H    isosorbide mononitrate  30 mg Oral Daily    metoprolol succinate  25 mg Oral Daily    lisinopril  20 mg Oral Daily    rivaroxaban  20 mg Oral Daily with breakfast    rosuvastatin  20 mg Oral Daily    mometasone-formoterol  2 puff Inhalation BID    predniSONE  40 mg Oral Daily    furosemide  40 mg IntraVENous BID    hydrALAZINE  25 mg Oral 3 times per day       Continuous Infusions:   sodium chloride         PRN Meds:  sodium chloride flush, sodium chloride, magnesium hydroxide, acetaminophen **OR** acetaminophen, albuterol    Labs:  CBC:   Recent Labs     11/02/22 1929 11/03/22 0418 11/04/22 0452   WBC 11.1* 12.3* 15.0*   HGB 9.0* 9.2* 9.3*   HCT 28.7* 29.0* 30.0*   MCV 89.6 88.8 90.1    175 182     BMP:   Recent Labs     11/02/22 1929 11/03/22 0418 11/04/22 0452    140 139   K 4.2 4.3 4.5   CL 94* 97* 92*   CO2 39* 35* 37*   BUN 21* 23* 37*   CREATININE 1.2 1.3* 1.3*     LIVER PROFILE:   Recent Labs     11/02/22 1929   AST 15   ALT 9*   BILITOT 0.3   ALKPHOS 64     PT/INR:   Recent Labs     11/03/22 0418   PROTIME 16.4*   INR 1.33*     APTT: No results for input(s): APTT in the last 72 hours. UA:  Recent Labs     11/02/22 1929   COLORU Yellow   PHUR 7.0   WBCUA 9*   RBCUA 2   BACTERIA None Seen   CLARITYU Clear   SPECGRAV 1.009   LEUKOCYTESUR SMALL*   UROBILINOGEN 0.2   BILIRUBINUR Negative   BLOODU Negative   GLUCOSEU Negative     No results for input(s): PH, PCO2, PO2 in the last 72 hours. ECHO: 9/21   Mod conc. LVH with normal LV size & wall motion. EF is    60%. Diastolic   filling parameters suggest grade II diastolic dysfunction. The left atrium is severely dilated. Normal right ventricular size and function. Trivial mitral, aortic and tricuspid regurgitation. ABG:  None     Chest X-ray:  Chest imaging was reviewed by me and showed possible vascular congestion         I reviewed all the above labs and studies pertaining to this visit. ASSESSMENT/PLAN:  Acute on Chronic Hypoxic Respiratory Failure  Titrate oxygen for saturations greater than or equal to 90%  Baseline is aroudn 3 liters  Severe COPD, not clear if this is flared up vs CHF   Diuresis   Duonebs q 4 hours  Dulera q 12 hours  Prednisone for 5 days   Abnormal CXR with suggestion of pulmonary edema   IV diuersis to continue.   Negative 2.4 liters since admission   Insomnia due to steroids  Reduce prednisone to 20 mg per day      DVT prophylaxis  Uri Aponte, DO  [facility]

## 2022-11-04 NOTE — PLAN OF CARE
Problem: Discharge Planning  Goal: Discharge to home or other facility with appropriate resources  11/4/2022 1556 by Ronnell Nava RN  Outcome: Progressing  Flowsheets (Taken 11/4/2022 0800)  Discharge to home or other facility with appropriate resources: Identify barriers to discharge with patient and caregiver  11/4/2022 0318 by Claude Schilling RN  Outcome: Progressing     Problem: Safety - Adult  Goal: Free from fall injury  11/4/2022 1556 by Ronnell Nava RN  Outcome: Progressing  11/4/2022 0318 by Claude Schilling RN  Outcome: Progressing     Problem: Chronic Conditions and Co-morbidities  Goal: Patient's chronic conditions and co-morbidity symptoms are monitored and maintained or improved  11/4/2022 1556 by Ronnell Nava RN  Outcome: Progressing  Flowsheets (Taken 11/4/2022 0800)  Care Plan - Patient's Chronic Conditions and Co-Morbidity Symptoms are Monitored and Maintained or Improved: Monitor and assess patient's chronic conditions and comorbid symptoms for stability, deterioration, or improvement  11/4/2022 0318 by Claude Schilling RN  Outcome: Progressing     Problem: Pain  Goal: Verbalizes/displays adequate comfort level or baseline comfort level  11/4/2022 1556 by Ronnell Nava RN  Outcome: Progressing  11/4/2022 0318 by Claude Schilling RN  Outcome: Progressing     Problem: Respiratory - Adult  Goal: Achieves optimal ventilation and oxygenation  11/4/2022 1556 by Ronnell Nava RN  Outcome: Progressing  11/4/2022 0318 by Claude Schilling RN  Outcome: Progressing     Problem: Cardiovascular - Adult  Goal: Maintains optimal cardiac output and hemodynamic stability  11/4/2022 1556 by Ronnell Nava RN  Outcome: Progressing  11/4/2022 0318 by Claude Schilling RN  Outcome: Progressing  Goal: Absence of cardiac dysrhythmias or at baseline  11/4/2022 1556 by Ronnell Nava RN  Outcome: Progressing  11/4/2022 0318 by Claude Schilling RN  Outcome: Progressing     Problem: Metabolic/Fluid and Electrolytes - Adult  Goal: Electrolytes maintained within normal limits  11/4/2022 1556 by Brooks Mccoy RN  Outcome: Progressing  Flowsheets (Taken 11/4/2022 0800)  Electrolytes maintained within normal limits: Monitor labs and assess patient for signs and symptoms of electrolyte imbalances  11/4/2022 0318 by Aixa Valerio RN  Outcome: Progressing  Goal: Hemodynamic stability and optimal renal function maintained  11/4/2022 1556 by Brooks Mccoy RN  Outcome: Progressing  Flowsheets (Taken 11/4/2022 0800)  Hemodynamic stability and optimal renal function maintained: Monitor labs and assess for signs and symptoms of volume excess or deficit  11/4/2022 0318 by Aixa Valerio RN  Outcome: Progressing     Problem: ABCDS Injury Assessment  Goal: Absence of physical injury  11/4/2022 1556 by Brooks Mccoy RN  Outcome: Progressing  11/4/2022 0318 by Aixa Vaelrio RN  Outcome: Progressing     Problem: Skin/Tissue Integrity  Goal: Absence of new skin breakdown  Description: 1. Monitor for areas of redness and/or skin breakdown  2. Assess vascular access sites hourly  3. Every 4-6 hours minimum:  Change oxygen saturation probe site  4. Every 4-6 hours:  If on nasal continuous positive airway pressure, respiratory therapy assess nares and determine need for appliance change or resting period.   Outcome: Progressing

## 2022-11-04 NOTE — PLAN OF CARE
Problem: Discharge Planning  Goal: Discharge to home or other facility with appropriate resources  Outcome: Progressing     Problem: Safety - Adult  Goal: Free from fall injury  Outcome: Progressing     Problem: Chronic Conditions and Co-morbidities  Goal: Patient's chronic conditions and co-morbidity symptoms are monitored and maintained or improved  Outcome: Progressing     Problem: Pain  Goal: Verbalizes/displays adequate comfort level or baseline comfort level  Outcome: Progressing     Problem: Respiratory - Adult  Goal: Achieves optimal ventilation and oxygenation  Outcome: Progressing     Problem: Cardiovascular - Adult  Goal: Maintains optimal cardiac output and hemodynamic stability  Outcome: Progressing  Goal: Absence of cardiac dysrhythmias or at baseline  Outcome: Progressing     Problem: Metabolic/Fluid and Electrolytes - Adult  Goal: Electrolytes maintained within normal limits  Outcome: Progressing  Goal: Hemodynamic stability and optimal renal function maintained  Outcome: Progressing     Problem: ABCDS Injury Assessment  Goal: Absence of physical injury  Outcome: Progressing

## 2022-11-04 NOTE — PROGRESS NOTES
Judy   Daily Progress Note      Admit Date:  11/2/2022    CC: SOB    HPI:   Fabián Garcia is a 69 y.o. female with PMH CAD, AF s/p PVI ablation 10/2020- recurrent AF with DCCV 1/2021 and repeat AF ablation 2/2021 (failed flecaininde), HTN, aortic aneurysm, AAA s/p repair 3/2018 (Dr. Galvan Dear, DENISE (intolerant to CPAP). Mary  stress>[facility] 3/2018 revealed  of RCA and non obstructive dz of LAD and LCX. Adm to [facility] 5/2022 for dofetilide loading, converted to NSR. Multiple admissions for resp failure and heart failure over last year. Followed by myself and Dr. Bobbi Duarte. Had abn stress followed by [facility] 9/20/2022 showing CAD -   She was previously considered for a cardiomems PA sensor but that has been on hold per Dr. Bobbi Duarte. Adm to [facility] with acute hypoxic resp failure. O2 sat down to 60%. Associated with hypertension. Cardiology, Dr. Bobbi Duarte, consulted for HF. She has been started on IV lasix and diuresing well. Her SOB is improving since admission. Her baseline SOB occurs with minimal activity at home. She is in [facility] mostly and gets SOB when transferring to chair or toilet. This AM she C/O \"chest pain. \" She apparently had a SL NTG from home with her and took that. Pain now resolved 30 min after onset. Upon further assessment, patient describes her CP more as \"fluttering\" and heart racing. She is vague with her symptoms. SOB did not worsen and did not have N/V, diaphoresis, arm or jaw pain. EKG completed while in NSR, no ischemic changes. She was hypertensive at the time 176/52 - BP has since improved with AM meds. She continues to go in/out of AF AVR and NSR HR 70s.   + fatigue and generalized weakness. Review of Systems:   General: Denies fever, chills  Skin: Denies skin changes, rash, itching, lesions.   HEENT: Denies headache, dizziness  RESP: Denies cough, sputum,  wheeze, snoring  CARD: Denies palpitations,  murmur  GI:Denies nausea, vomiting, heartburn, loss of appetite, change in bowels  : Denies frequency, pain  VASC: Denies claudication, leg cramps, clots  MUSC/SKEL: Denies pain, stiffness, arthritis  PSYCH: Denies anxiety, depression, stress  NEURO: Denies numbness, tingling, weakness,change in mood or memory  HEME: Denies abn bruising, bleeding, anemia  ENDO: Denies intolerance to heat, cold, excessive thirst or hunger, hx thyroid disease    Objective:   BP (!) 176/52 Comment: RN notified  Pulse 62   Temp 98.3 °F (36.8 °C) (Oral)   Resp 19   Ht 5' 4\" (1.626 m)   Wt 230 lb 6.1 oz (104.5 kg)   SpO2 100%   BMI 39.54 kg/m²         Intake/Output Summary (Last 24 hours) at 11/4/2022 0859  Last data filed at 11/3/2022 2155  Gross per 24 hour   Intake 240 ml   Output 1725 ml   Net -1485 ml     I/O since adm: Neg 2395    WEIGHT:Admit Weight: 234 lb 9.1 oz (106.4 kg)         Today  Weight: 230 lb 6.1 oz (104.5 kg)   DRY WEIGHT:  Wt Readings from Last 3 Encounters:   11/04/22 230 lb 6.1 oz (104.5 kg)   10/13/22 228 lb (103.4 kg)   09/29/22 219 lb (99.3 kg)       Physical Exam:  GEN: Appears well, no acute distress  SKIN: Pink, warm, dry. HEENT: PERRLA. No adenopathy. LUNG: AP diameter normal. Clear bilateral. No wheeze, rales, or ronchi. Respiratory effort normal.  HEART: S1S2 A/R. No JVD. No carotid bruit. No murmur, rub or gallop. ABD: Soft, nontender. +BS X 4 quads. No hepatomegaly. EXT: Radial and pedal pulses 2+ and symmetric. Without varicosities. No edema. MUSCSKEL: Good ROM X4 extremities. No deformity. NEURO: A/O X3. Calm and cooperative.      Telemetry:    Medications:    sodium chloride flush  10 mL IntraVENous 2 times per day    ipratropium-albuterol  1 ampule Inhalation Q4H WA    clopidogrel  75 mg Oral Daily    dofetilide  250 mcg Oral Q12H    isosorbide mononitrate  30 mg Oral Daily    metoprolol succinate  25 mg Oral Daily    lisinopril  20 mg Oral Daily    rivaroxaban  20 mg Oral Daily with breakfast    rosuvastatin  20 mg Oral Daily mometasone-formoterol  2 puff Inhalation BID    predniSONE  40 mg Oral Daily    furosemide  40 mg IntraVENous BID    hydrALAZINE  25 mg Oral 3 times per day      sodium chloride       sodium chloride flush, sodium chloride, magnesium hydroxide, acetaminophen **OR** acetaminophen, albuterol    Lab Data: I have reviewed all labs below today.    CBC:   Recent Labs     11/02/22 1929 11/03/22 0418 11/04/22 0452   WBC 11.1* 12.3* 15.0*   HGB 9.0* 9.2* 9.3*   HCT 28.7* 29.0* 30.0*   MCV 89.6 88.8 90.1    175 182     BMP:   Recent Labs     11/02/22 1929 11/03/22 0418 11/04/22 0452    140 139   K 4.2 4.3 4.5   CL 94* 97* 92*   CO2 39* 35* 37*   BUN 21* 23* 37*   CREATININE 1.2 1.3* 1.3*     GLUCOSE:   Recent Labs     11/02/22 1929 11/03/22 0418 11/04/22 0452   GLUCOSE 122* 129* 101*     LIVER PROFILE:   Lab Results   Component Value Date/Time    AST 15 11/02/2022 07:29 PM    ALT 9 11/02/2022 07:29 PM    LIPASE 43.0 06/02/2015 01:41 AM    AMYLASE 34 10/02/2013 11:46 AM    LABALBU 3.9 11/02/2022 07:29 PM    BILIDIR <0.2 07/14/2015 07:24 AM    BILITOT 0.3 11/02/2022 07:29 PM    ALKPHOS 64 11/02/2022 07:29 PM     PT/INR:   Lab Results   Component Value Date/Time    PROTIME 16.4 11/03/2022 04:18 AM    INR 1.33 11/03/2022 04:18 AM    INR 2.22 02/15/2020 06:28 PM    INR 1.00 03/21/2018 06:40 AM     APTT:   Lab Results   Component Value Date/Time    APTT 37.2 02/15/2020 06:28 PM     Pro-BNP:    Lab Results   Component Value Date/Time    PROBNP 5,478 11/04/2022 04:52 AM    PROBNP 3,858 11/02/2022 07:29 PM    PROBNP 4,288 07/26/2022 10:57 PM     Parameters:   > 450 pg/mL under age 48  > 900 pg/mL ages 54-65  > 1800 pg/mL over age 76    ENZYMES:  Lab Results   Component Value Date/Time    TROPONINI 0.03 11/03/2022 07:02 AM    TROPONINI 0.04 11/03/2022 04:18 AM    TROPONINI 0.04 11/02/2022 07:29 PM     FASTING LIPID PANEL:  Lab Results   Component Value Date/Time    CHOL 107 09/10/2021 07:22 AM    HDL 56 09/10/2021 07:22 AM    HDL 38 09/09/2011 03:35 PM    LDLCALC 39 09/10/2021 07:22 AM    TRIG 59 09/10/2021 07:22 AM       Diagnostics:    EKG:   C: ([facility]) 4/2017   of RCA chronic LAD 10-20% RA 4 PA24/9 16 wedge 9 LVEDP markedly elevated 30     Carotid US 10/2017 at [facility]:  1. Estimated diameter reduction of the right internal carotid artery is  less than 50%. 2.  Estimated diameter reduction of the left internal carotid artery is  50-69%. 3.  The bilateral common carotid arteries reveal no evidence of   significant stenosis. 4.  There is greater than 50% stenosis of the right external carotid  artery. 5.  There is no evidence of significant stenosis in the left external  carotid artery. 6.  The bilateral vertebral arteries are patent with antegrade flow. 7.  The bilateral subclavian arteries reveal no evidence of significant  stenosis. 8.  There has been no significant change from the previous study of  4/21/2017. Echo 1/9/2018:  Mild concentric left ventricular hypertrophy. Visually estimated ejection fraction of 65%. Mitral annular calcification. Mild left atrial dilation. Mild aortic stenosis. (mean gradients 51MLBQ)  The systolic pulmonary artery pressure (SPAP) estimated at 30 mmHg  (estimated RA pressure of 8 mmHg included). Lexiscan 2/19/18   Summary    Abnormal study. There is a moderate sized, mild intensity, partially    reversible defect of the mid to apical anterior and mid anterolateral walls    which is consistent with ischemia. There is breast attenuation but stress    images appear worse. Normal LV size and systolic function. Overall findings represent an intermediate risk study      Cardiac Cath 3/5/18  OVERALL IMPRESSION  1. Again, 100% proximal right coronary artery occlusion with left to right  collaterals. 2.  Mild disease of the distal left main trunk. 3.  20% to 30% ostial left anterior descending artery stenosis with  collaterals from LAD to the right coronary artery.   4. 30% to 40% stenosis of the proximal circumflex artery. 5.  Normal left ventricular systolic function with estimated EF of 55% to  60%. 6.  Slightly enlarged aortic root with no evidence of aortic stenosis or  regurgitation. In view of the above findings, we will okay the patient for her upcoming  aortic aneurysm surgery from cardiac standpoint. ECHO 1/2/20  There is moderate concentric left ventricular hypertrophy. Patient appears to be in atrial fibrillation. Ejection fraction is estimated to be 50-60%. Indeterminate diastolic function. Mild aortic regurgitation. Mild tricuspid regurgitation. Mild mitral regurgitation with mitral annular calcification     Right Heart Cath 2/28/2020  1.  _____ normal right cardiac pressure. 2.  Elevated pulmonary capillary wedge pressure with a mean pulmonary  capillary wedge of 29 mmHg. 3.  Normal cardiac output and indices. 4.  No oxygen step off to suggest ASD/PFO. In view of the above findings, we will start the patient on a small dose  of diuretic therapy and see whether we need to adjust some of her  antihypertensive medicines or not. Her findings are consistent with a  diastolic heart failure. ECHO 9/10/2021  Summary   Mod conc. LVH with normal LV size & wall motion. EF is   60%. Diastolic   filling parameters suggest grade II diastolic dysfunction. The left atrium is severely dilated. Normal right ventricular size and function. Trivial mitral, aortic and tricuspid regurgitation. 9/6/2022 Nm Stress   Summary    Abnormal myocardial perfusion study    Moderate sized moderate intensity mid to distal anterior, chad-lateral    reversible defect suggestive of ischemia    Reversible mid to base inferior wall defect    Possible suggestion of 2 V region of ischemia    Overall findings represent a intermediate-high risk study. Suggest [facility] 9/20/2022  CORONARY ANGIOGRAPHY:  1. Left main trunk: It arises from the left sinus of Valsalva. It  divides into LAD and circumflex artery. Left main trunk has mild  disease, but it is not hemodynamically significant. 2.  Left anterior descending artery: It arises from left main trunk. It is a tortuous artery and there appears to be a mild plaque of the  ostium. Remainder of the LAD appears free of atherosclerosis. 3.  Left circumflex artery:  The left circumflex artery is a large  artery and it has a 75% stenosis in the proximal segment. There is mild  disease in the obtuse marginal branch distally. There are collaterals  being supplied from the coronary system to the right coronary artery. 4.  RCA:  It arises from the right sinus of Valsalva. It is diffusely  diseased in the proximal and mid segment and it is completely blocked  after that. 5.  Left ventriculogram reveals a preserved left ventricular systolic  function with estimated EF of 55% to 60%. OVERALL IMPRESSION:  1. Mildly elevated right heart pressures. 2.  Normal cardiac output and indices. 3.  No oxygen step-up to suggest ASD or PFO. 4.  Patent left main trunk with mild disease. 5.  Tortuous LAD with mild osteal disease, not hemodynamically  significant. 6.  75% stenosis of the proximal circumflex artery with some mild  disease of the obtuse marginal branch, not hemodynamically significant. 7.  Left-to-right collaterals. 8.  100% occlusion of mid RCA. 9.  Normal left ventricular systolic function with preserved EF of 65%  to 70%. [facility]/PCI w Dr. Kamilah Lagos 9/20/2022  Interventions:  PCI performed to the proximal Lcx with DESx2  Resolute [place] 3.5x22 mm  Resolute [place] 3. 5x8 mm  Excellent post angiographic result with BALWINDER 3 flow   Findings:     Left Main  Distal non obstructive 30% disease, evaluated by IVUS MLA > 7.5 mm2   LAD  Moderate diffuse disease   Circ  Proximal vessel 75-80% stenosis correlating with positive functional study   Mid vessel non-obstructive disease 50%   RCA   with L-R collaterals Interventions/Vessels  PCI performed to the proximal Lcx with DESx2   Resolute [place] 3.5x22 mm   Resolute [place] 3. 5x8 mm   Excellent post angiographic result with BALWINDER 3 flow   Guides/Wires  XB 3.5 guide catheter   Terumo RunThrough   Balanced Heavy Weight   Devices  Opticross b[place] IVUS   Post % Stenosis  0   Closure Device  Perclose R CFA   Complications  None      Conclusion:   Successful PCI of proximal Lcx with DESx2  Plavix loaded in cath lab  Femoral artery perclosed with excellent hemostasis  3H bedrest  Home this evening, follow up in 1-2 weeks     ECHO 11/3/2022  Summary  Borderline conc. LVH with normal wall motion; EF ~70%. Grade II diastolic dysfunction with elevated LH filling pressures. The left atrium is severely dilated. The right ventricle is normal in size and function. Mild mitral, aortic and tricuspid regurgitation. Impression   Mild pulmonary vascular congestion. Stable mild cardiomegaly. Assessment/Plan:     1.) Acute on chronic diastolic heart failure, Gr II, LVEF 70%, mild MR, AR, TR: Exacerbated by hypertensive urgency on admission. She has been diuresing. BNP is still elevated/worsened and she still has SOB with minimal effort. Hard to determine her volume status. Creat now 1.3. RHC would be helpful. Discussed with pt and  and she agrees to proceed. Right heart cath procedure explained and all questions addressed. Risks, benefits and alternatives reviewed including bruising, bleeding, lung puncture, abnormal heart rhythm, cardiac tamponade, infection, blood clot and pulmonary artery rupture. Continue GDMT for now, adjust based on RHC results  NYHA Class III              Stage C  Diuretic: lasix 40mg IV BID  Cont lisinopril, toprol XL  MRA, SGLT2i not necessarily indicated for HFpEF. Cardiac Rehab for EF <35%: NA, EF 70%.   2gm Na diet, daily weight, 64 oz fluid restriction  Avoid NSAIDS and other nephrotoxic meds  [facility]: OP  [facility] sensor as [facility]    2.) Paroxysmal Atrial Fib: s/p atrial fib and L flutter ablations, failed flecainide, now on dofetilide. She is going in/out of AF RVR and NSR. Discussed with Pravin Cardenas EP who knows this patient well. Cont current med management with tikosyn and toprol XL- will increase toprol dose  Hope for improvement when euvolemic. CHADSVASC- 6    HASBLED- 3 (high risk for bleed but has not had any bleeding episodes)  Thromboembolic risk reduction: xarelto    3.) Acute Chest pain: Seems like she is feeling palpitations and not actual CP. Correlates with onset of AF this AM. Now resolved. EKG without any ischemia. She refuses to get any more labs this morning and I don't feel strongly that would be helpful. She had mild elevated trop on admission attributed to HF.     4.) Hypertension: Urgency on adm. Now improving with AM meds. Goal BP <130/80. Non pharmacologic interventions include:  -weight loss  -heart healthy low sodium and low fat diet that consist of mostly fruits, vegetables and grains (Dash diet)  -limited amount of alcohol (no more than 1 drink/day for women, 2 drinks/day for men)  -regular physical activity  -no smoking  -stress reduction     5.) CAD: Hx  RCA, LHC 9/20 with DESX2 to LCX. Stable. Cont GDMT.   DAPT: plavix and eliquis  Beta Blocker: toprol XL   ACEi/ARB:lisinopril  Anti anginal:   Lipid management/high intensity statin: crestor  Risk factor management: high blood pressure, high cholesterol, Diabetes, smoking, obesity, family hx  Lifestyle modification: Heart healthy diet, regular exercise, weight loss, smoking cessation, stress reduction    Electronically signed by CHRISTINE Hernandez - CNP on 11/4/2022 at 8:59 AM

## 2022-11-04 NOTE — PROGRESS NOTES
Called to patient's room for c/o chest pain 6/10 radiating to her right arm. /101. Patient states she took a sublingual nitroglycerin that she had from home. Stat EKG ordered, [facility] called. Awaiting further orders.

## 2022-11-04 NOTE — PROGRESS NOTES
Hospitalist Progress Note  11/4/2022 9:40 AM    PCP: Bhavani Siu MD    [id number]     Date of Admission: 11/2/2022                                                                                                                     HOSPITAL COURSE    Patient demographics:  The patient  Nadira Riley is a 69 y.o. female     Significant past medical history:   Patient Active Problem List   Diagnosis    Primary hypertension    Hyperlipidemia    Coronary atherosclerosis due to calcified coronary lesion of native artery    DENISE (obstructive sleep apnea)    History of DVT (deep vein thrombosis)    Family history of DVT    AAA (abdominal aortic aneurysm) without rupture    Pleural effusion, left    Pleural effusion    COPD exacerbation ([facility])    Pelvic pain in female    Vitamin D deficiency    Other emphysema ([facility])    Acute bronchitis    Acute respiratory failure with hypoxia ([facility])    Abnormal CXR    Atypical pneumonia    Atrial fibrillation with RVR ([facility])    Chronic diastolic heart failure ([facility])    PVD (peripheral vascular disease) ([facility])    Abnormal nuclear stress test    AAA (abdominal aortic aneurysm)    PAF (paroxysmal atrial fibrillation) ([facility])    Endoleak post (EVAR) endovascular aneurysm repair, sequela    Carotid artery stenosis without cerebral infarction, bilateral    History of repair of aneurysm of abdominal aorta using endovascular stent graft    Atherosclerotic heart disease of native coronary artery with other forms of angina pectoris ([facility])    Morbidly obese (HCC)    COPD, severe ([address]    Hypertensive crisis    Acute on chronic diastolic congestive heart failure (HCC)    Respiratory failure (HCC)    Left atrial flutter by electrocardiogram ([address]    Persistent atrial fibrillation (HCC)    A-fib ([facility])    Obesity (BMI 30-39. 9)    Atrial fibrillation ([facility])    LI (dyspnea on exertion)    Chronic systolic (congestive) heart failure    Pneumonia    Acute on chronic respiratory failure with hypoxia ([facility]) Presenting symptoms:      Diagnostic workup:      CONSULTS DURING ADMISSION :   IP CONSULT TO HOSPITALIST  IP CONSULT TO PULMONOLOGY  IP CONSULT TO CARDIOLOGY  IP CONSULT TO HEART FAILURE NURSE/COORDINATOR      Patient was diagnosed with:        Treatment while inpatient:  69years old female with medical history significant for atrial fibrillation coronary artery disease CHF severe COPD hypertension. Patient presented to the emergency room with shortness of breath. Patient has baseline chronic respiratory failure with 4 L nasal cannula oxygen. Patient was diagnosed with acute on chronic respiratory failure due to COPD exacerbation.                                                                                  ----------------------------------------------------------      SUBJECTIVE COMPLAINTS-     Diet: ADULT DIET;  Regular; Low Fat/Low Chol/High Fiber/DOMENICA; Low Sodium (2 gm); 1200 ml      OBJECTIVE:   Patient Active Problem List   Diagnosis    Primary hypertension    Hyperlipidemia    Coronary atherosclerosis due to calcified coronary lesion of native artery    DENISE (obstructive sleep apnea)    History of DVT (deep vein thrombosis)    Family history of DVT    AAA (abdominal aortic aneurysm) without rupture    Pleural effusion, left    Pleural effusion    COPD exacerbation ([facility])    Pelvic pain in female    Vitamin D deficiency    Other emphysema ([facility])    Acute bronchitis    Acute respiratory failure with hypoxia ([facility])    Abnormal CXR    Atypical pneumonia    Atrial fibrillation with RVR ([facility])    Chronic diastolic heart failure ([facility])    PVD (peripheral vascular disease) ([facility])    Abnormal nuclear stress test    AAA (abdominal aortic aneurysm)    PAF (paroxysmal atrial fibrillation) ([facility])    Endoleak post (EVAR) endovascular aneurysm repair, sequela    Carotid artery stenosis without cerebral infarction, bilateral    History of repair of aneurysm of abdominal aorta using endovascular stent graft Atherosclerotic heart disease of native coronary artery with other forms of angina pectoris ([facility]    Morbidly obese (HCC)    COPD, severe ([facility]    Hypertensive crisis    Acute on chronic diastolic congestive heart failure (HCC)    Respiratory failure ([facility])    Left atrial flutter by electrocardiogram ([facility]    Persistent atrial fibrillation (HCC)    A-fib (HCC)    Obesity (BMI 30-39. 9)    Atrial fibrillation (HCC)    LI (dyspnea on exertion)    Chronic systolic (congestive) heart failure    Pneumonia    Acute on chronic respiratory failure with hypoxia ([facility])       Allergies  Morphine    Medications    Scheduled Meds:   [START ON 11/5/2022] predniSONE  20 mg Oral Daily    sodium chloride flush  10 mL IntraVENous 2 times per day    ipratropium-albuterol  1 ampule Inhalation Q4H WA    clopidogrel  75 mg Oral Daily    dofetilide  250 mcg Oral Q12H    isosorbide mononitrate  30 mg Oral Daily    metoprolol succinate  25 mg Oral Daily    lisinopril  20 mg Oral Daily    rivaroxaban  20 mg Oral Daily with breakfast    rosuvastatin  20 mg Oral Daily    mometasone-formoterol  2 puff Inhalation BID    furosemide  40 mg IntraVENous BID    hydrALAZINE  25 mg Oral 3 times per day     Continuous Infusions:   sodium chloride       PRN Meds:  sodium chloride flush, sodium chloride, magnesium hydroxide, acetaminophen **OR** acetaminophen, albuterol    Vitals   Vitals /wt Patient Vitals for the past 8 hrs:   BP Temp Temp src Pulse Resp SpO2 Weight   11/04/22 0820 -- -- -- 62 19 100 % --   11/04/22 0715 (!) 176/52 98.3 °F (36.8 °C) Oral 61 -- 100 % --   11/04/22 0539 (!) 154/85 -- -- -- -- -- --   11/04/22 0337 (!) 159/67 97.9 °F (36.6 °C) Oral -- -- 100 % 230 lb 6.1 oz (104.5 kg)        72HR INTAKE/OUTPUT:    Intake/Output Summary (Last 24 hours) at 11/4/2022 0940  Last data filed at 11/4/2022 0928  Gross per 24 hour   Intake 490 ml   Output 1725 ml   Net -1235 ml       Exam:    Gen:   Alert and oriented ×3    Eyes: PERRL.  No sclera icterus. No conjunctival injection. ENT: No discharge. Pharynx clear. External appearance of ears and nose normal.  Neck: Trachea midline. No obvious mass. Resp: No accessory muscle use. No crackles. No wheezes. No rhonchi. CV: Regular rate. Regular rhythm. No murmur or rub. No edema. GI: Non-tender. Non-distended. No hernia. Skin: Warm, dry, normal texture and turgor. Lymph: No cervical LAD. No supraclavicular LAD. M/S: / Ext. No cyanosis. No clubbing. No joint deformity. Neuro: CN 2-12 are intact,  no neurologic deficits noted. PT/INR:   Recent Labs     11/03/22 0418   PROTIME 16.4*   INR 1.33*     APTT: No results for input(s): APTT in the last 72 hours. CBC:   Recent Labs     11/02/22 1929 11/03/22 0418 11/04/22 0452   WBC 11.1* 12.3* 15.0*   HGB 9.0* 9.2* 9.3*   HCT 28.7* 29.0* 30.0*   MCV 89.6 88.8 90.1    175 182       BMP:   Recent Labs     11/02/22 1929 11/03/22 0418 11/04/22 0452    140 139   K 4.2 4.3 4.5   CL 94* 97* 92*   CO2 39* 35* 37*   BUN 21* 23* 37*   CREATININE 1.2 1.3* 1.3*       LIVER PROFILE:   Recent Labs     11/02/22 1929   ALKPHOS 64   AST 15   ALT 9*   BILITOT 0.3     No results for input(s): AMYLASE in the last 72 hours. No results for input(s): LIPASE in the last 72 hours. UA:  Recent Labs     11/02/22 1929   WBCUA 9*   RBCUA 2       TROPONIN:   Recent Labs     11/02/22 1929 11/03/22 0418 11/03/22 0702   TROPONINI 0.04* 0.04* 0.03*       Lab Results   Component Value Date/Time    URRFLXCULT Not Indicated 11/02/2022 07:29 PM       No results for input(s): TSHREFLEX in the last 72 hours. No components found for: [id number]  POC GLUCOSE:  No results for input(s): POCGLU in the last 72 hours. No results for input(s): LABA1C in the last 72 hours. Lab Results   Component Value Date/Time    LABA1C 5.6 05/23/2019 02:38 PM         ASSESSMENT AND PLAN  Acute on chronic respiratory failure.   - with increased work of breath, tachypnea and hypoxia  - home 4 liters saturation 67%  -Titrate oxygen for saturations greater than or equal to 90%    COPD Exacerbation J44.1  Steroids and bronchodilators and antibiotics  bronchial higiene  Consult to pulmonary     Acute on chronic diastolic CHF  Ejection fraction 70% and grade 2 diastolic dysfunction  - cont lasix, BB, ACEi  - daily wts and I/Os  Cardiology is following  Right heart cath today       CAD with PCI and JORJE 9/20/2022  - Successful PCI of proximal Lcx with DESx2  - continue bb, acei, imdur, hydralazine and nitro prn  - continue asa, plavix and statin     Atrial Fibrillation   - currently rate controlled  - continue Xarelto and dofetilide      Hypertensive urgency at the time of admission  -Blood pressure control has improved  Continue lisinopril and Toprol-XL      Code Status: Full Code        Dispo - cc        The patient and / or the family were informed of the results of any tests, a time was given to answer questions, a plan was proposed and they agreed with plan. Theron Kothari MD    This note was transcribed using [id number] [employer]. Please disregard any translational errors.

## 2022-11-05 ENCOUNTER — APPOINTMENT (OUTPATIENT)
Dept: GENERAL RADIOLOGY | Age: 77
DRG: 280 | End: 2022-11-05
Payer: MEDICARE

## 2022-11-05 PROBLEM — F19.982 DRUG-INDUCED INSOMNIA (HCC): Status: ACTIVE | Noted: 2022-11-05

## 2022-11-05 LAB
ANION GAP SERPL CALCULATED.3IONS-SCNC: 8 MMOL/L (ref 3–16)
BASOPHILS ABSOLUTE: 0 K/UL (ref 0–0.2)
BASOPHILS RELATIVE PERCENT: 0.1 %
BUN BLDV-MCNC: 43 MG/DL (ref 7–20)
CALCIUM SERPL-MCNC: 9.4 MG/DL (ref 8.3–10.6)
CHLORIDE BLD-SCNC: 92 MMOL/L (ref 99–110)
CO2: 39 MMOL/L (ref 21–32)
CREAT SERPL-MCNC: 1.5 MG/DL (ref 0.6–1.2)
EKG ATRIAL RATE: 156 BPM
EKG DIAGNOSIS: NORMAL
EKG Q-T INTERVAL: 346 MS
EKG QRS DURATION: 90 MS
EKG QTC CALCULATION (BAZETT): 509 MS
EKG R AXIS: -3 DEGREES
EKG T AXIS: 150 DEGREES
EKG VENTRICULAR RATE: 130 BPM
EOSINOPHILS ABSOLUTE: 0.2 K/UL (ref 0–0.6)
EOSINOPHILS RELATIVE PERCENT: 1.1 %
GFR SERPL CREATININE-BSD FRML MDRD: 36 ML/MIN/{1.73_M2}
GLUCOSE BLD-MCNC: 109 MG/DL (ref 70–99)
HCT VFR BLD CALC: 28.9 % (ref 36–48)
HEMOGLOBIN: 9.1 G/DL (ref 12–16)
INR BLD: 1.38 (ref 0.87–1.14)
LYMPHOCYTES ABSOLUTE: 1.7 K/UL (ref 1–5.1)
LYMPHOCYTES RELATIVE PERCENT: 10.7 %
MCH RBC QN AUTO: 27.8 PG (ref 26–34)
MCHC RBC AUTO-ENTMCNC: 31.6 G/DL (ref 31–36)
MCV RBC AUTO: 88.1 FL (ref 80–100)
MONOCYTES ABSOLUTE: 1.2 K/UL (ref 0–1.3)
MONOCYTES RELATIVE PERCENT: 7.3 %
NEUTROPHILS ABSOLUTE: 12.7 K/UL (ref 1.7–7.7)
NEUTROPHILS RELATIVE PERCENT: 80.8 %
PDW BLD-RTO: 16.4 % (ref 12.4–15.4)
PLATELET # BLD: 215 K/UL (ref 135–450)
PMV BLD AUTO: 9.6 FL (ref 5–10.5)
POTASSIUM REFLEX MAGNESIUM: 3.8 MMOL/L (ref 3.5–5.1)
PROTHROMBIN TIME: 17 SEC (ref 11.7–14.5)
RBC # BLD: 3.28 M/UL (ref 4–5.2)
SODIUM BLD-SCNC: 139 MMOL/L (ref 136–145)
WBC # BLD: 15.7 K/UL (ref 4–11)

## 2022-11-05 PROCEDURE — 94640 AIRWAY INHALATION TREATMENT: CPT

## 2022-11-05 PROCEDURE — 6360000002 HC RX W HCPCS: Performed by: NURSE PRACTITIONER

## 2022-11-05 PROCEDURE — 6370000000 HC RX 637 (ALT 250 FOR IP): Performed by: NURSE PRACTITIONER

## 2022-11-05 PROCEDURE — 85610 PROTHROMBIN TIME: CPT

## 2022-11-05 PROCEDURE — 6370000000 HC RX 637 (ALT 250 FOR IP): Performed by: HOSPITALIST

## 2022-11-05 PROCEDURE — 2700000000 HC OXYGEN THERAPY PER DAY

## 2022-11-05 PROCEDURE — 2580000003 HC RX 258: Performed by: INTERNAL MEDICINE

## 2022-11-05 PROCEDURE — 36415 COLL VENOUS BLD VENIPUNCTURE: CPT

## 2022-11-05 PROCEDURE — 99232 SBSQ HOSP IP/OBS MODERATE 35: CPT | Performed by: NURSE PRACTITIONER

## 2022-11-05 PROCEDURE — 71045 X-RAY EXAM CHEST 1 VIEW: CPT

## 2022-11-05 PROCEDURE — 2500000003 HC RX 250 WO HCPCS: Performed by: INTERNAL MEDICINE

## 2022-11-05 PROCEDURE — 6360000002 HC RX W HCPCS: Performed by: INTERNAL MEDICINE

## 2022-11-05 PROCEDURE — 2060000000 HC ICU INTERMEDIATE R&B

## 2022-11-05 PROCEDURE — 2500000003 HC RX 250 WO HCPCS: Performed by: NURSE PRACTITIONER

## 2022-11-05 PROCEDURE — 85025 COMPLETE CBC W/AUTO DIFF WBC: CPT

## 2022-11-05 PROCEDURE — 2580000003 HC RX 258: Performed by: NURSE PRACTITIONER

## 2022-11-05 PROCEDURE — 80048 BASIC METABOLIC PNL TOTAL CA: CPT

## 2022-11-05 PROCEDURE — 94760 N-INVAS EAR/PLS OXIMETRY 1: CPT

## 2022-11-05 PROCEDURE — 6370000000 HC RX 637 (ALT 250 FOR IP): Performed by: INTERNAL MEDICINE

## 2022-11-05 PROCEDURE — 93451 RIGHT HEART CATH: CPT | Performed by: STUDENT IN AN ORGANIZED HEALTH CARE EDUCATION/TRAINING PROGRAM

## 2022-11-05 PROCEDURE — 93010 ELECTROCARDIOGRAM REPORT: CPT | Performed by: INTERNAL MEDICINE

## 2022-11-05 RX ORDER — FUROSEMIDE 10 MG/ML
40 INJECTION INTRAMUSCULAR; INTRAVENOUS 2 TIMES DAILY
Status: DISCONTINUED | OUTPATIENT
Start: 2022-11-05 | End: 2022-11-06

## 2022-11-05 RX ORDER — POTASSIUM CHLORIDE 20 MEQ/1
40 TABLET, EXTENDED RELEASE ORAL PRN
Status: DISCONTINUED | OUTPATIENT
Start: 2022-11-05 | End: 2022-11-08 | Stop reason: HOSPADM

## 2022-11-05 RX ORDER — FUROSEMIDE 10 MG/ML
40 INJECTION INTRAMUSCULAR; INTRAVENOUS 3 TIMES DAILY
Status: DISCONTINUED | OUTPATIENT
Start: 2022-11-05 | End: 2022-11-05

## 2022-11-05 RX ORDER — OXYMETAZOLINE HYDROCHLORIDE 0.05 G/100ML
2 SPRAY NASAL 2 TIMES DAILY PRN
Status: DISPENSED | OUTPATIENT
Start: 2022-11-05 | End: 2022-11-07

## 2022-11-05 RX ORDER — DIPHENHYDRAMINE HYDROCHLORIDE, ZINC ACETATE 2; .1 G/100G; G/100G
CREAM TOPICAL 3 TIMES DAILY PRN
Status: DISCONTINUED | OUTPATIENT
Start: 2022-11-05 | End: 2022-11-08 | Stop reason: HOSPADM

## 2022-11-05 RX ORDER — POTASSIUM CHLORIDE 7.45 MG/ML
10 INJECTION INTRAVENOUS PRN
Status: DISCONTINUED | OUTPATIENT
Start: 2022-11-05 | End: 2022-11-08 | Stop reason: HOSPADM

## 2022-11-05 RX ADMIN — CLOPIDOGREL BISULFATE 75 MG: 75 TABLET ORAL at 08:50

## 2022-11-05 RX ADMIN — IPRATROPIUM BROMIDE AND ALBUTEROL SULFATE 1 AMPULE: .5; 3 SOLUTION RESPIRATORY (INHALATION) at 20:00

## 2022-11-05 RX ADMIN — LISINOPRIL 20 MG: 20 TABLET ORAL at 08:50

## 2022-11-05 RX ADMIN — HYDRALAZINE HYDROCHLORIDE 50 MG: 50 TABLET, FILM COATED ORAL at 13:12

## 2022-11-05 RX ADMIN — ROSUVASTATIN CALCIUM 20 MG: 20 TABLET, FILM COATED ORAL at 08:50

## 2022-11-05 RX ADMIN — IPRATROPIUM BROMIDE AND ALBUTEROL SULFATE 1 AMPULE: .5; 3 SOLUTION RESPIRATORY (INHALATION) at 15:25

## 2022-11-05 RX ADMIN — PREDNISONE 20 MG: 20 TABLET ORAL at 08:50

## 2022-11-05 RX ADMIN — FUROSEMIDE 40 MG: 10 INJECTION, SOLUTION INTRAMUSCULAR; INTRAVENOUS at 17:29

## 2022-11-05 RX ADMIN — RIVAROXABAN 20 MG: 20 TABLET, FILM COATED ORAL at 08:50

## 2022-11-05 RX ADMIN — SODIUM CHLORIDE, PRESERVATIVE FREE 10 ML: 5 INJECTION INTRAVENOUS at 21:28

## 2022-11-05 RX ADMIN — Medication 9 MG: at 21:14

## 2022-11-05 RX ADMIN — DILTIAZEM HYDROCHLORIDE 10 MG/HR: 5 INJECTION, SOLUTION INTRAVENOUS at 06:32

## 2022-11-05 RX ADMIN — MOMETASONE FUROATE AND FORMOTEROL FUMARATE DIHYDRATE 2 PUFF: 200; 5 AEROSOL RESPIRATORY (INHALATION) at 20:00

## 2022-11-05 RX ADMIN — DOFETILIDE 250 MCG: 0.25 CAPSULE ORAL at 10:34

## 2022-11-05 RX ADMIN — ISOSORBIDE MONONITRATE 30 MG: 30 TABLET, EXTENDED RELEASE ORAL at 08:50

## 2022-11-05 RX ADMIN — FUROSEMIDE 40 MG: 10 INJECTION, SOLUTION INTRAMUSCULAR; INTRAVENOUS at 08:58

## 2022-11-05 RX ADMIN — HYDRALAZINE HYDROCHLORIDE 50 MG: 50 TABLET, FILM COATED ORAL at 21:10

## 2022-11-05 RX ADMIN — MOMETASONE FUROATE AND FORMOTEROL FUMARATE DIHYDRATE 2 PUFF: 200; 5 AEROSOL RESPIRATORY (INHALATION) at 07:49

## 2022-11-05 RX ADMIN — HYDRALAZINE HYDROCHLORIDE 50 MG: 50 TABLET, FILM COATED ORAL at 05:36

## 2022-11-05 RX ADMIN — DILTIAZEM HYDROCHLORIDE 5 MG/HR: 5 INJECTION INTRAVENOUS at 08:59

## 2022-11-05 RX ADMIN — DOFETILIDE 250 MCG: 0.25 CAPSULE ORAL at 21:10

## 2022-11-05 RX ADMIN — IPRATROPIUM BROMIDE AND ALBUTEROL SULFATE 1 AMPULE: .5; 3 SOLUTION RESPIRATORY (INHALATION) at 07:49

## 2022-11-05 RX ADMIN — OXYMETAZOLINE HCL 2 SPRAY: 0.05 SPRAY NASAL at 21:11

## 2022-11-05 RX ADMIN — IPRATROPIUM BROMIDE AND ALBUTEROL SULFATE 1 AMPULE: .5; 3 SOLUTION RESPIRATORY (INHALATION) at 11:47

## 2022-11-05 RX ADMIN — DIPHENHYDRAMINE HYDROCHLORIDE, ZINC ACETATE: 2; .1 CREAM TOPICAL at 21:15

## 2022-11-05 NOTE — RT PROTOCOL NOTE
RT Nebulizer Bronchodilator Protocol Note    There is a bronchodilator order in the chart from a provider indicating to follow the RT Bronchodilator Protocol and there is an Initiate RT Bronchodilator Protocol order as well (see protocol at bottom of note). CXR Findings:  XR CHEST PORTABLE    Result Date: 11/5/2022  1. Pulmonary vascular congestion with questionable increased left perihilar edema in apparent decreased right perihilar edema, potentially congestive heart failure given mild cardiomegaly. Pneumonia could appear similar. 2. Questionable bibasilar airspace opacities potentially due to summation of overlying tissues, atelectasis, or pneumonia. The findings from the last RT Protocol Assessment were as follows:  Smoking: Chronic pulmonary disease  Respiratory Pattern: Dyspnea on exertion or RR 21-25 bpm  Breath Sounds: Slightly diminished and/or crackles  Cough: Strong, productive  Indication for Bronchodilator Therapy: On home bronchodilators  Bronchodilator Assessment Score: 7    Aerosolized bronchodilator medication orders have been revised according to the RT Nebulizer Bronchodilator Protocol below. Respiratory Therapist to perform RT Therapy Protocol Assessment initially then follow the protocol. Repeat RT Therapy Protocol Assessment PRN for score 0-3 or on second treatment, BID, and PRN for scores above 3. No Indications - adjust the frequency to every 6 hours PRN wheezing or bronchospasm, if no treatments needed after 48 hours then discontinue using Per Protocol order mode. If indication present, adjust the RT bronchodilator orders based on the Bronchodilator Assessment Score as indicated below. If a patient is on this medication at home then do not decrease Frequency below that used at home.     0-3 - enter or revise RT bronchodilator order(s) to equivalent RT Bronchodilator order with Frequency of every 4 hours PRN for wheezing or increased work of breathing using Per Protocol order mode. 4-6 - enter or revise RT Bronchodilator order(s) to two equivalent RT bronchodilator orders with one order with BID Frequency and one order with Frequency of every 4 hours PRN wheezing or increased work of breathing using Per Protocol order mode. 7-10 - enter or revise RT Bronchodilator order(s) to two equivalent RT bronchodilator orders with one order with TID Frequency and one order with Frequency of every 4 hours PRN wheezing or increased work of breathing using Per Protocol order mode. 11-13 - enter or revise RT Bronchodilator order(s) to one equivalent RT bronchodilator order with QID Frequency and an Albuterol order with Frequency of every 4 hours PRN wheezing or increased work of breathing using Per Protocol order mode. Greater than 13 - enter or revise RT Bronchodilator order(s) to one equivalent RT bronchodilator order with every 4 hours Frequency and an Albuterol order with Frequency of every 2 hours PRN wheezing or increased work of breathing using Per Protocol order mode. Pt takes HHN BID and PRN at home.      Electronically signed by Cuco Bell RCP on 11/5/2022 at 3:30 PM

## 2022-11-05 NOTE — PLAN OF CARE
Problem: Discharge Planning  Goal: Discharge to home or other facility with appropriate resources  Outcome: Progressing     Problem: Safety - Adult  Goal: Free from fall injury  Outcome: Progressing     Problem: Chronic Conditions and Co-morbidities  Goal: Patient's chronic conditions and co-morbidity symptoms are monitored and maintained or improved  Outcome: Progressing     Problem: Pain  Goal: Verbalizes/displays adequate comfort level or baseline comfort level  Outcome: Progressing     Problem: Respiratory - Adult  Goal: Achieves optimal ventilation and oxygenation  Outcome: Progressing     Problem: Cardiovascular - Adult  Goal: Maintains optimal cardiac output and hemodynamic stability  Outcome: Progressing  Goal: Absence of cardiac dysrhythmias or at baseline  Outcome: Progressing     Problem: Metabolic/Fluid and Electrolytes - Adult  Goal: Electrolytes maintained within normal limits  Outcome: Progressing  Goal: Hemodynamic stability and optimal renal function maintained  Outcome: Progressing     Problem: ABCDS Injury Assessment  Goal: Absence of physical injury  Outcome: Progressing     Problem: Skin/Tissue Integrity  Goal: Absence of new skin breakdown  Description: 1. Monitor for areas of redness and/or skin breakdown  2. Assess vascular access sites hourly  3. Every 4-6 hours minimum:  Change oxygen saturation probe site  4. Every 4-6 hours:  If on nasal continuous positive airway pressure, respiratory therapy assess nares and determine need for appliance change or resting period.   Outcome: Progressing

## 2022-11-05 NOTE — PROGRESS NOTES
Pulmonary/Critical Care Progress Note    CC:  Follow-up:  Acute on chronic hypoxemic respiratory failure with SPO2 <90% on room air with baseline O2 requirement of 3L  Severe COPD  Abnormal CXR, possible pulmonary edema  Insomnia likely d/t steroids    Subjective:  Remains on 4L NC  Had heart cath yesterday which showed post-cap pulm HTN  Continues to diurese, -4L during hospital stay    ROS  No productive cough  No fever  SOB improved with diuresis    Intake/Output Summary (Last 24 hours) at 11/5/2022 0958  Last data filed at 11/5/2022 4005  Gross per 24 hour   Intake 200 ml   Output 2150 ml   Net -1950 ml         PHYSICAL EXAM:  Blood pressure (!) 129/43, pulse 54, temperature 98.1 °F (36.7 °C), temperature source Oral, resp. rate 22, height 5' 4\" (1.626 m), weight 228 lb 2.8 oz (103.5 kg), SpO2 96 %, not currently breastfeeding.'  Gen: No distress. Well developed, well-nourished  Eyes: No scleral icterus. No conjunctival injection. ENT: External appearance of ears and nose normal.  Neck: Trachea midline. No obvious mass. No visible thyroid enlargement     Respiratory: No crackles. No wheezes. No rhonchi. No accessory muscle use. Diminished  Cardiovascular: Bradycardic. Regular rhythm. No murmur or rub. No edema  GI: Non-tender. Non-distended. No hernia. Bowel sounds present  Skin: Warm, dry, normal texture and turgor. No abnormalities on exposed extremities. Musculoskeletal: No cyanosis. No clubbing. No joint deformity. Neuro: Moves all four extremities.    Psychiatric:  Normal mood and affect; exhibits normal insight and judgement     Medications:    Scheduled Meds:   furosemide  40 mg IntraVENous TID    predniSONE  20 mg Oral Daily    hydrALAZINE  50 mg Oral 3 times per day    metoprolol succinate  50 mg Oral Daily    sodium chloride flush  10 mL IntraVENous 2 times per day    ipratropium-albuterol  1 ampule Inhalation Q4H WA    clopidogrel  75 mg Oral Daily    dofetilide  250 mcg Oral Q12H    isosorbide mononitrate  30 mg Oral Daily    lisinopril  20 mg Oral Daily    rivaroxaban  20 mg Oral Daily with breakfast    rosuvastatin  20 mg Oral Daily    mometasone-formoterol  2 puff Inhalation BID       Continuous Infusions:   dilTIAZem 5 mg/hr (11/05/22 0859)    sodium chloride         PRN Meds:  hydrALAZINE, melatonin, sodium chloride flush, sodium chloride, magnesium hydroxide, acetaminophen **OR** acetaminophen, albuterol    Labs:  CBC:   Recent Labs     11/03/22 0418 11/04/22 0452 11/05/22 0446   WBC 12.3* 15.0* 15.7*   HGB 9.2* 9.3* 9.1*   HCT 29.0* 30.0* 28.9*   MCV 88.8 90.1 88.1    182 215     BMP:   Recent Labs     11/03/22 0418 11/04/22 0452 11/05/22 0446    139 139   K 4.3 4.5 3.8   CL 97* 92* 92*   CO2 35* 37* 39*   BUN 23* 37* 43*   CREATININE 1.3* 1.3* 1.5*     LIVER PROFILE:   Recent Labs     11/02/22 1929   AST 15   ALT 9*   BILITOT 0.3   ALKPHOS 64     PT/INR:   Recent Labs     11/03/22 0418 11/04/22  1542 11/05/22 0446   PROTIME 16.4* 26.7* 17.0*   INR 1.33* 2.45* 1.38*     APTT: No results for input(s): APTT in the last 72 hours. UA:  Recent Labs     11/02/22 1929   COLORU Yellow   PHUR 7.0   WBCUA 9*   RBCUA 2   BACTERIA None Seen   CLARITYU Clear   SPECGRAV 1.009   LEUKOCYTESUR SMALL*   UROBILINOGEN 0.2   BILIRUBINUR Negative   BLOODU Negative   GLUCOSEU Negative     No results for input(s): PH, PCO2, PO2 in the last 72 hours. Films:  Chest imaging and associated reports were personally reviewed and showed CXR 11/2:  Mild pulmonary vascular congestion. Stable mild cardiomegaly. ABG:  No new study    Cultures:  Blood:  Urine:  Sputum:  Strep/Legionella Antigens: negative  MRSA probe:  Respiratory Viral Panel/Influenza: negative  C. Diff:   COVID19: negative    ECHO  11/3/22   Summary   Borderline conc. LVH with normal wall motion; EF   70%. Grade II diastolic   dysfunction with elevated LH filling pressures. The left atrium is severely dilated.    The right ventricle is normal in size and function. Mild mitral, aortic and tricuspid regurgitation. PFTs  2015  Spirometry  Spirometry shows severe obstructive defect. Lung Volumes  Lung volumes show moderate restrictive defect. There is a decreased ERV consistent with obesity. Bronchodilator  There is no response to bronchodilator demonstrated. [Increase in FEV1 and/or FVC => 12% of control and => 200 ml]     Flow-Volume Loop  The flow-volume loop is compatible with an obstructive process. Diffusing Capacity  Diffusing capacity is moderately decreased. [40-60%]     Overall Interpretation  Severe obstruction with no response to bronchodilators     Moderate Restriction     Moderate Reduction in diffusing capacity    Assessment/Plan:     Acute on chronic hypoxemic respiratory failure with SPO2 <90% on room air with baseline O2 requirement of 3L  Maintain oxygen saturations >90% with supplemental oxygen and wean as tolerated  Continue diuresis, lasix 40mg IV TID  Duonebs and dulera    Severe COPD  Duonebs and dulera    Abnormal CXR, possible pulmonary edema  Diuresis    Insomnia likely d/t steroids  Decreased prednisone yesterday    DVT prophylaxis with xarelto    Thank you for allowing me to participate in the care of this patient. Will follow.     Clementine Fajardo, RAULP  [facility] Pulmonary, Sleep, and Critical Care

## 2022-11-05 NOTE — PROGRESS NOTES
[facility]   Daily Progress Note      Admit Date:  11/2/2022    CC: SOB    HPI:   Sonia Larson is a 69 y.o. female with PMH CAD, AF s/p PVI ablation 10/2020- recurrent AF with DCCV 1/2021 and repeat AF ablation 2/2021 (failed flecaininde), HTN, aortic aneurysm, AAA s/p repair 3/2018 (Dr. Shannan Sheehan, DENISE (intolerant to CPAP). Suly Susana stress>[facility] 3/2018 revealed  of RCA and non obstructive dz of LAD and LCX. Adm to [facility] 5/2022 for dofetilide loading, converted to NSR. Multiple admissions for resp failure and heart failure over last year. Followed by myself and Dr. Osmin Smith. Had abn stress followed by [facility] 9/20/2022 showing CAD -   She was previously considered for a cardiomems PA sensor but that has been on hold per Dr. Osmin Smith. Adm to [facility] with acute hypoxic resp failure. O2 sat down to 60%. Associated with hypertension. Cardiology, Dr. Osmin Smith, consulted for HF. Started on IV lasix and diuresing well. SOB was improving, however hard to tell her volume status with COPD exacerbation. Underwent RHC yesterday which showed she was hypervolemic -continued diuretics. Overnight her HR increased 150-160s. Dr. Linus Hodge was notified. He spoke to EP and started on cardizem gtt. She is feeling better this AM.   Decreased SOB, denies any CP or palpitations this AM.   Weaning her cardizem gtt down- now at 5mg/hr and HR 50s.   + fatigue and generalized weakness. Review of Systems:   General: Denies fever, chills  Skin: Denies skin changes, rash, itching, lesions.   HEENT: Denies headache, dizziness  RESP: Denies cough, sputum,  wheeze, snoring  CARD: Denies palpitations,  murmur  GI:Denies nausea, vomiting, heartburn, loss of appetite, change in bowels  : Denies frequency, pain  VASC: Denies claudication, leg cramps, clots  MUSC/SKEL: Denies pain, stiffness, arthritis  PSYCH: Denies anxiety, depression, stress  NEURO: Denies numbness, tingling, weakness,change in mood or memory  HEME: Denies abn bruising, bleeding, anemia  ENDO: Denies intolerance to heat, cold, excessive thirst or hunger, hx thyroid disease    Objective:   BP (!) 139/46   Pulse (!) 45   Temp 98.6 °F (37 °C) (Oral)   Resp 24   Ht 5' 4\" (1.626 m)   Wt 228 lb 2.8 oz (103.5 kg)   SpO2 100%   BMI 39.17 kg/m²         Intake/Output Summary (Last 24 hours) at 11/5/2022 0844  Last data filed at 11/5/2022 3615  Gross per 24 hour   Intake 450 ml   Output 2150 ml   Net -1700 ml     I/O since adm: Neg 4L    WEIGHT:Admit Weight: 234 lb 9.1 oz (106.4 kg)         Today  Weight: 228 lb 2.8 oz (103.5 kg)   DRY WEIGHT:  Wt Readings from Last 3 Encounters:   11/05/22 228 lb 2.8 oz (103.5 kg)   10/13/22 228 lb (103.4 kg)   09/29/22 219 lb (99.3 kg)       Physical Exam:  GEN: Appears obese, no acute distress  SKIN: Pink, warm, dry. HEENT: PERRLA. No adenopathy. LUNG: AP diameter normal. Crackles bilateral bases, few exp wheeze. Respiratory effort at baseline on 4L NC.  HEART: S1S2 A/R. No JVD. No carotid bruit. No murmur, rub or gallop. ABD: Soft, nontender. +BS X 4 quads. No hepatomegaly. EXT: Radial and pedal pulses 2+ and symmetric. Without varicosities. Trace BLE edema. MUSCSKEL: Good ROM X4 extremities. No deformity. NEURO: A/O X3. Calm and cooperative.      Telemetry: NSR/SB    Medications:    predniSONE  20 mg Oral Daily    hydrALAZINE  50 mg Oral 3 times per day    metoprolol succinate  50 mg Oral Daily    sodium chloride flush  10 mL IntraVENous 2 times per day    ipratropium-albuterol  1 ampule Inhalation Q4H WA    clopidogrel  75 mg Oral Daily    dofetilide  250 mcg Oral Q12H    isosorbide mononitrate  30 mg Oral Daily    lisinopril  20 mg Oral Daily    rivaroxaban  20 mg Oral Daily with breakfast    rosuvastatin  20 mg Oral Daily    mometasone-formoterol  2 puff Inhalation BID    furosemide  40 mg IntraVENous BID      dilTIAZem 10 mg/hr (11/05/22 2711)    sodium chloride       hydrALAZINE, melatonin, sodium chloride flush, sodium chloride, magnesium hydroxide, acetaminophen **OR** acetaminophen, albuterol    Lab Data: I have reviewed all labs below today.    CBC:   Recent Labs     11/03/22 0418 11/04/22 0452 11/05/22 0446   WBC 12.3* 15.0* 15.7*   HGB 9.2* 9.3* 9.1*   HCT 29.0* 30.0* 28.9*   MCV 88.8 90.1 88.1    182 215     BMP:   Recent Labs     11/03/22 0418 11/04/22 0452 11/05/22 0446    139 139   K 4.3 4.5 3.8   CL 97* 92* 92*   CO2 35* 37* 39*   BUN 23* 37* 43*   CREATININE 1.3* 1.3* 1.5*     GLUCOSE:   Recent Labs     11/03/22 0418 11/04/22 0452 11/05/22 0446   GLUCOSE 129* 101* 109*     LIVER PROFILE:   Lab Results   Component Value Date/Time    AST 15 11/02/2022 07:29 PM    ALT 9 11/02/2022 07:29 PM    LIPASE 43.0 06/02/2015 01:41 AM    AMYLASE 34 10/02/2013 11:46 AM    LABALBU 3.9 11/02/2022 07:29 PM    BILIDIR <0.2 07/14/2015 07:24 AM    BILITOT 0.3 11/02/2022 07:29 PM    QLLFLML 53 11/02/2022 07:29 PM     PT/INR:   Lab Results   Component Value Date/Time    PROTIME 17.0 11/05/2022 04:46 AM    INR 1.38 11/05/2022 04:46 AM    INR 2.45 11/04/2022 03:42 PM    INR 1.33 11/03/2022 04:18 AM     APTT:   Lab Results   Component Value Date/Time    APTT 37.2 02/15/2020 06:28 PM     Pro-BNP:    Lab Results   Component Value Date/Time    PROBNP 5,478 11/04/2022 04:52 AM    PROBNP 3,858 11/02/2022 07:29 PM    PROBNP 4,288 07/26/2022 10:57 PM     Parameters:   > 450 pg/mL under age 48  > 900 pg/mL ages 54-65  > 1800 pg/mL over age 76    ENZYMES:  Lab Results   Component Value Date/Time    TROPONINI 0.03 11/03/2022 07:02 AM    TROPONINI 0.04 11/03/2022 04:18 AM    TROPONINI 0.04 11/02/2022 07:29 PM     FASTING LIPID PANEL:  Lab Results   Component Value Date/Time    CHOL 107 09/10/2021 07:22 AM    HDL 56 09/10/2021 07:22 AM    HDL 38 09/09/2011 03:35 PM    LDLCALC 39 09/10/2021 07:22 AM    TRIG 59 09/10/2021 07:22 AM       Diagnostics:    EKG:   [facility]: ([address]) 4/2017   of RCA chronic LAD 10-20% RA 4 PA24/9 16 wedge 9 LVEDP markedly elevated 30     Carotid US 10/2017 at [facility]:  1. Estimated diameter reduction of the right internal carotid artery is  less than 50%. 2.  Estimated diameter reduction of the left internal carotid artery is  50-69%. 3.  The bilateral common carotid arteries reveal no evidence of   significant stenosis. 4.  There is greater than 50% stenosis of the right external carotid  artery. 5.  There is no evidence of significant stenosis in the left external  carotid artery. 6.  The bilateral vertebral arteries are patent with antegrade flow. 7.  The bilateral subclavian arteries reveal no evidence of significant  stenosis. 8.  There has been no significant change from the previous study of  4/21/2017. Echo 1/9/2018:  Mild concentric left ventricular hypertrophy. Visually estimated ejection fraction of 65%. Mitral annular calcification. Mild left atrial dilation. Mild aortic stenosis. (mean gradients [id number])  The systolic pulmonary artery pressure (SPAP) estimated at 30 mmHg  (estimated RA pressure of 8 mmHg included). Lexiscan 2/19/18   Summary    Abnormal study. There is a moderate sized, mild intensity, partially    reversible defect of the mid to apical anterior and mid anterolateral walls    which is consistent with ischemia. There is breast attenuation but stress    images appear worse. Normal LV size and systolic function. Overall findings represent an intermediate risk study      Cardiac Cath 3/5/18  OVERALL IMPRESSION  1. Again, 100% proximal right coronary artery occlusion with left to right  collaterals. 2.  Mild disease of the distal left main trunk. 3.  20% to 30% ostial left anterior descending artery stenosis with  collaterals from LAD to the right coronary artery. 4.  30% to 40% stenosis of the proximal circumflex artery. 5.  Normal left ventricular systolic function with estimated EF of 55% to  60%.   6.  Slightly enlarged aortic root with no evidence of aortic stenosis or  regurgitation. In view of the above findings, we will okay the patient for her upcoming  aortic aneurysm surgery from cardiac standpoint. ECHO 1/2/20  There is moderate concentric left ventricular hypertrophy. Patient appears to be in atrial fibrillation. Ejection fraction is estimated to be 50-60%. Indeterminate diastolic function. Mild aortic regurgitation. Mild tricuspid regurgitation. Mild mitral regurgitation with mitral annular calcification     Right Heart Cath 2/28/2020  1.  _____ normal right cardiac pressure. 2.  Elevated pulmonary capillary wedge pressure with a mean pulmonary  capillary wedge of 29 mmHg. 3.  Normal cardiac output and indices. 4.  No oxygen step off to suggest ASD/PFO. In view of the above findings, we will start the patient on a small dose  of diuretic therapy and see whether we need to adjust some of her  antihypertensive medicines or not. Her findings are consistent with a  diastolic heart failure. ECHO 9/10/2021  Summary   Mod conc. LVH with normal LV size & wall motion. EF is   60%. Diastolic   filling parameters suggest grade II diastolic dysfunction. The left atrium is severely dilated. Normal right ventricular size and function. Trivial mitral, aortic and tricuspid regurgitation. 9/6/2022 Nm Stress   Summary    Abnormal myocardial perfusion study    Moderate sized moderate intensity mid to distal anterior, chad-lateral    reversible defect suggestive of ischemia    Reversible mid to base inferior wall defect    Possible suggestion of 2 V region of ischemia    Overall findings represent a intermediate-high risk study. Suggest [facility] 9/20/2022  CORONARY ANGIOGRAPHY:  1. Left main trunk: It arises from the left sinus of Valsalva. It  divides into LAD and circumflex artery. Left main trunk has mild  disease, but it is not hemodynamically significant. 2.  Left anterior descending artery: It arises from left main trunk.    It is a tortuous artery and there appears to be a mild plaque of the  ostium. Remainder of the LAD appears free of atherosclerosis. 3.  Left circumflex artery:  The left circumflex artery is a large  artery and it has a 75% stenosis in the proximal segment. There is mild  disease in the obtuse marginal branch distally. There are collaterals  being supplied from the coronary system to the right coronary artery. 4.  RCA:  It arises from the right sinus of Valsalva. It is diffusely  diseased in the proximal and mid segment and it is completely blocked  after that. 5.  Left ventriculogram reveals a preserved left ventricular systolic  function with estimated EF of 55% to 60%. OVERALL IMPRESSION:  1. Mildly elevated right heart pressures. 2.  Normal cardiac output and indices. 3.  No oxygen step-up to suggest ASD or PFO. 4.  Patent left main trunk with mild disease. 5.  Tortuous LAD with mild osteal disease, not hemodynamically  significant. 6.  75% stenosis of the proximal circumflex artery with some mild  disease of the obtuse marginal branch, not hemodynamically significant. 7.  Left-to-right collaterals. 8.  100% occlusion of mid RCA. 9.  Normal left ventricular systolic function with preserved EF of 65%  to 70%. C/PCI w Dr. Jak Justice 9/20/2022  Interventions:  PCI performed to the proximal Lcx with DESx2  Resolute [place] 3.5x22 mm  Resolute [place] 3. 5x8 mm  Excellent post angiographic result with BALWINDER 3 flow   Findings:     Left Main  Distal non obstructive 30% disease, evaluated by IVUS MLA > 7.5 mm2   LAD  Moderate diffuse disease   Circ  Proximal vessel 75-80% stenosis correlating with positive functional study   Mid vessel non-obstructive disease 50%   RCA   with L-R collaterals         Interventions/Vessels  PCI performed to the proximal Lcx with DESx2   Resolute [place] 3.5x22 mm   Resolute [place] 3. 5x8 mm   Excellent post angiographic result with BALWINDER 3 flow   Guides/Wires  XB 3.5 guide catheter [place] RunThrough   Balanced Heavy Weight   Devices  Opticross b[place] IVUS   Post % Stenosis  0   Closure Device  Perclose R CFA   Complications  None      Conclusion:   Successful PCI of proximal Lcx with DESx2  Plavix loaded in cath lab  Femoral artery perclosed with excellent hemostasis  3H bedrest  Home this evening, follow up in 1-2 weeks     ECHO 11/3/2022  Summary  Borderline conc. LVH with normal wall motion; EF ~70%. Grade II diastolic dysfunction with elevated LH filling pressures. The left atrium is severely dilated. The right ventricle is normal in size and function. Mild mitral, aortic and tricuspid regurgitation. Impression   Mild pulmonary vascular congestion. Stable mild cardiomegaly. RHC 11/4/2022  RA 5  RV 53/9  PA 49/20 (28)  PCWP25  CI 3.8    Assessment/Plan:     1.) Acute on chronic diastolic heart failure, Gr II, LVEF 70%, mild MR, AR, TR: Exacerbated by hypertensive urgency on admission. RHC with elevated L heart pressures PCWP 25, PAD 20. Continue diuresis. NYHA Class III              Stage C  Diuretic: lasix 40mg IV TID  Cont lisinopril, toprol XL  MRA, SGLT2i not necessarily indicated for HFpEF. Cardiac Rehab for EF <35%: NA, EF 70%. 2gm Na diet, daily weight, 64 oz fluid restriction  Avoid NSAIDS and other nephrotoxic meds  [facility]: OP  Plan for Cardiomems PA sensor as OP    2.) Paroxysmal Atrial Fib: s/p atrial fib and L flutter ablations, failed flecainide, now on dofetilide. She is going in/out of AF RVR and NSR. Improved with cardizem gtt, HR now 50s. Stop cardizem and cont toprol XL -dose increased yesterday  [address] for improvement when euvolemic. CHADSVASC- 6    HASBLED- 3 (high risk for bleed but has not had any bleeding episodes)  Thromboembolic risk reduction: xarelto    3.) Acute Chest pain: Resolved today. Seems like she was feeling palpitations and not actual CP. Correlates with onset of AF. EKG without any ischemia. She refuses to get any more labs this morning and I don't feel strongly that would be helpful. She had mild elevated trop on admission attributed to HF.     4.) Hypertension: Urgency on adm. Now improving with AM meds. Goal BP <130/80. Non pharmacologic interventions include:  -weight loss  -heart healthy low sodium and low fat diet that consist of mostly fruits, vegetables and grains (Dash diet)  -limited amount of alcohol (no more than 1 drink/day for women, 2 drinks/day for men)  -regular physical activity  -no smoking  -stress reduction     5.) CAD: Hx  RCA, LHC 9/20 with DESX2 to LCX. Stable. Cont GDMT.   DAPT: plavix and eliquis  Beta Blocker: toprol XL   ACEi/ARB:lisinopril  Anti anginal:   Lipid management/high intensity statin: crestor  Risk factor management: high blood pressure, high cholesterol, Diabetes, smoking, obesity, family hx  Lifestyle modification: Heart healthy diet, regular exercise, weight loss, smoking cessation, stress reduction    Electronically signed by CHRISTINE Balderrama CNP on 11/5/2022 at 8:44 AM

## 2022-11-05 NOTE — PROCEDURES
Cardiac Catheterization Operative Report    Zacarias Eckert  [id number]  11/5/2022  Shun Mendoza MD    Patient has a diagnosis of Shortness of breath / PAH. She was referred for cardiac catheterization to evaluation of intracardiac and pulmonary pressures. Procedure Details  The risks, benefits, complications, treatment options, and expected outcomes were discussed with the patient. The patient and/or family concurred with the proposed plan, giving informed consent. Patient was brought to the cath lab after IV hydration was begun and oral premedication was given. She was further sedated with fentanyl and versed. She was prepped and draped in the usual manner. Using the modified Seldinger access technique,  a 7 [place] sheath was placed in the Right antecubital veins. A right heart catheterization was done. Sedation:   Local anesthesia was administered at IV site, no IV sedation used for procedure    After the procedure was completed. sedation was stopped and the sheaths and catheters were all removed. Hemostasis was achieved with manual pressure. Findings:    RA  5   RV  53/9   PA  49/20 (28)   PCWP  25   long term  3.8     Conclusion:   - Post capillary pulmonary hypertension  - Recommend continued diuresis    Estimated Blood Loss:  minimal         Complications:  None; patient tolerated the procedure well.            Disposition: PACU - hemodynamically stable           Condition: stable    Mary Amezcua MD  Interventional Cardiology  8/18/2022  8:43 AM

## 2022-11-05 NOTE — PROGRESS NOTES
Hospitalist Progress Note  11/5/2022 11:39 AM    PCP: Ana Monzon MD    [id number]     Date of Admission: 11/2/2022                                                                                                             HOSPITAL COURSE    Patient demographics:  The patient  Leno Palmer is a 69 y.o. female     Significant past medical history:   Patient Active Problem List   Diagnosis    Primary hypertension    Hyperlipidemia    Coronary atherosclerosis due to calcified coronary lesion of native artery    DENISE (obstructive sleep apnea)    History of DVT (deep vein thrombosis)    Family history of DVT    AAA (abdominal aortic aneurysm) without rupture    Pleural effusion, left    Pleural effusion    COPD exacerbation ([facility])    Pelvic pain in female    Vitamin D deficiency    Other emphysema (HCC)    Acute bronchitis    Acute respiratory failure with hypoxia ([facility])    Abnormal CXR    Atypical pneumonia    Atrial fibrillation with RVR ([facility])    Chronic diastolic heart failure ([facility])    PVD (peripheral vascular disease) ([facility])    Abnormal nuclear stress test    AAA (abdominal aortic aneurysm)    PAF (paroxysmal atrial fibrillation) ([facility])    Endoleak post (EVAR) endovascular aneurysm repair, sequela    Carotid artery stenosis without cerebral infarction, bilateral    History of repair of aneurysm of abdominal aorta using endovascular stent graft    Atherosclerotic heart disease of native coronary artery with other forms of angina pectoris (HCC)    Morbidly obese (HCC)    COPD, severe ([address]    Hypertensive crisis    Acute on chronic diastolic congestive heart failure (HCC)    Respiratory failure (HCC)    Left atrial flutter by electrocardiogram ([address]    Persistent atrial fibrillation (HCC)    A-fib ([facility])    Obesity (BMI 30-39. 9)    Atrial fibrillation (HCC)    LI (dyspnea on exertion)    Chronic systolic (congestive) heart failure    Pneumonia    Acute on chronic respiratory failure with hypoxia ([facility]) Presenting symptoms:      Diagnostic workup:      CONSULTS DURING ADMISSION :   IP CONSULT TO HOSPITALIST  IP CONSULT TO PULMONOLOGY  IP CONSULT TO CARDIOLOGY  IP CONSULT TO HEART FAILURE NURSE/COORDINATOR      Patient was diagnosed with:        Treatment while inpatient:  69years old female with medical history significant for atrial fibrillation coronary artery disease CHF severe COPD hypertension. Patient presented to the emergency room with shortness of breath. Patient has baseline chronic respiratory failure with 4 L nasal cannula oxygen. Patient was diagnosed with acute on chronic respiratory failure due to COPD exacerbation.                                                                                  ----------------------------------------------------------      SUBJECTIVE COMPLAINTS-     Diet: ADULT DIET;  Regular; Low Fat/Low Chol/High Fiber/DOMENICA; Low Sodium (2 gm); 1200 ml      OBJECTIVE:   Patient Active Problem List   Diagnosis    Primary hypertension    Hyperlipidemia    Coronary atherosclerosis due to calcified coronary lesion of native artery    DENISE (obstructive sleep apnea)    History of DVT (deep vein thrombosis)    Family history of DVT    AAA (abdominal aortic aneurysm) without rupture    Pleural effusion, left    Pleural effusion    COPD exacerbation ([facility])    Pelvic pain in female    Vitamin D deficiency    Other emphysema ([facility])    Acute bronchitis    Acute respiratory failure with hypoxia ([facility])    Abnormal CXR    Atypical pneumonia    Atrial fibrillation with RVR ([facility])    Chronic diastolic heart failure ([facility])    PVD (peripheral vascular disease) ([facility])    Abnormal nuclear stress test    AAA (abdominal aortic aneurysm)    PAF (paroxysmal atrial fibrillation) ([facility])    Endoleak post (EVAR) endovascular aneurysm repair, sequela    Carotid artery stenosis without cerebral infarction, bilateral    History of repair of aneurysm of abdominal aorta using endovascular stent graft Atherosclerotic heart disease of native coronary artery with other forms of angina pectoris ([facility]    Morbidly obese (HCC)    COPD, severe ([facility]    Hypertensive crisis    Acute on chronic diastolic congestive heart failure (HCC)    Respiratory failure ([facility])    Left atrial flutter by electrocardiogram ([facility]    Persistent atrial fibrillation (HCC)    A-fib (HCC)    Obesity (BMI 30-39. 9)    Atrial fibrillation (HCC)    LI (dyspnea on exertion)    Chronic systolic (congestive) heart failure    Pneumonia    Acute on chronic respiratory failure with hypoxia ([facility])       Allergies  Morphine    Medications    Scheduled Meds:   furosemide  40 mg IntraVENous TID    predniSONE  20 mg Oral Daily    hydrALAZINE  50 mg Oral 3 times per day    metoprolol succinate  50 mg Oral Daily    sodium chloride flush  10 mL IntraVENous 2 times per day    ipratropium-albuterol  1 ampule Inhalation Q4H WA    clopidogrel  75 mg Oral Daily    dofetilide  250 mcg Oral Q12H    isosorbide mononitrate  30 mg Oral Daily    lisinopril  20 mg Oral Daily    rivaroxaban  20 mg Oral Daily with breakfast    rosuvastatin  20 mg Oral Daily    mometasone-formoterol  2 puff Inhalation BID     Continuous Infusions:   dilTIAZem 5 mg/hr (11/05/22 0859)    sodium chloride       PRN Meds:  hydrALAZINE, melatonin, sodium chloride flush, sodium chloride, magnesium hydroxide, acetaminophen **OR** acetaminophen, albuterol    Vitals   Vitals /wt Patient Vitals for the past 8 hrs:   BP Temp Temp src Pulse Resp SpO2   11/05/22 1108 (!) 116/46 98.1 °F (36.7 °C) Oral 50 22 99 %   11/05/22 1000 -- -- -- 61 19 --   11/05/22 0900 -- -- -- 54 22 --   11/05/22 0845 (!) 129/43 98.1 °F (36.7 °C) Oral 56 20 96 %   11/05/22 0800 -- -- -- 58 20 --   11/05/22 0755 -- -- -- (!) 45 24 --   11/05/22 0754 -- -- -- (!) 44 21 100 %          72HR INTAKE/OUTPUT:    Intake/Output Summary (Last 24 hours) at 11/5/2022 1139  Last data filed at 11/5/2022 8808  Gross per 24 hour   Intake 200 ml Output 950 ml   Net -750 ml         Exam:    Gen:   Alert and oriented ×3    Eyes: PERRL. No sclera icterus. No conjunctival injection. ENT: No discharge. Pharynx clear. External appearance of ears and nose normal.  Neck: Trachea midline. No obvious mass. Resp: No accessory muscle use. No crackles. No wheezes. No rhonchi. CV: Regular rate. Regular rhythm. No murmur or rub. No edema. GI: Non-tender. Non-distended. No hernia. Skin: Warm, dry, normal texture and turgor. Lymph: No cervical LAD. No supraclavicular LAD. M/S: / Ext. No cyanosis. No clubbing. No joint deformity. Neuro: CN 2-12 are intact,  no neurologic deficits noted. PT/INR:   Recent Labs     11/03/22 0418 11/04/22  1542 11/05/22 0446   PROTIME 16.4* 26.7* 17.0*   INR 1.33* 2.45* 1.38*       APTT: No results for input(s): APTT in the last 72 hours. CBC:   Recent Labs     11/02/22 1929 11/03/22 0418 11/04/22 0452 11/05/22 0446   WBC 11.1* 12.3* 15.0* 15.7*   HGB 9.0* 9.2* 9.3* 9.1*   HCT 28.7* 29.0* 30.0* 28.9*   MCV 89.6 88.8 90.1 88.1    175 182 215         BMP:   Recent Labs     11/02/22 1929 11/03/22 0418 11/04/22 0452 11/05/22 0446    140 139 139   K 4.2 4.3 4.5 3.8   CL 94* 97* 92* 92*   CO2 39* 35* 37* 39*   BUN 21* 23* 37* 43*   CREATININE 1.2 1.3* 1.3* 1.5*         LIVER PROFILE:   Recent Labs     11/02/22 1929   ALKPHOS 64   AST 15   ALT 9*   BILITOT 0.3       No results for input(s): AMYLASE in the last 72 hours. No results for input(s): LIPASE in the last 72 hours. UA:  Recent Labs     11/02/22 1929   WBCUA 9*   RBCUA 2         TROPONIN:   Recent Labs     11/02/22 1929 11/03/22  0418 11/03/22  0702   TROPONINI 0.04* 0.04* 0.03*         Lab Results   Component Value Date/Time    URRFLXCULT Not Indicated 11/02/2022 07:29 PM       No results for input(s): TSHREFLEX in the last 72 hours.     No components found for: OAD0143  POC GLUCOSE:    Recent Labs     11/04/22  0947   POCGLU 151*     No results for input(s): LABA1C in the last 72 hours. Lab Results   Component Value Date/Time    LABA1C 5.6 05/23/2019 02:38 PM         ASSESSMENT AND PLAN  Acute on chronic respiratory failure. - with increased work of breath, tachypnea and hypoxia  - home 4 liters saturation 67%  -Titrate oxygen for saturations greater than or equal to 90%    COPD Exacerbation  Steroids and bronchodilators and antibiotics  bronchial higiene  Consult to pulmonary     Acute on chronic diastolic CHF  Ejection fraction 70% and grade 2 diastolic dysfunction  - cont lasix, BB, ACEi  - daily wts and I/Os  Cardiology is following  Right heart cath done 11/4 positive for pulmonary hypertension       CAD with PCI and JORJE 9/20/2022  - Successful PCI of proximal Lcx with DESx2  - continue bb, acei, imdur, hydralazine and nitro prn  - continue asa, plavix and statin     Atrial Fibrillation   - currently rate controlled  - continue Xarelto and dofetilide      Hypertensive urgency at the time of admission  -Blood pressure control has improved  Continue lisinopril and Toprol-XL      Code Status: Full Code        Dispo - cc        The patient and / or the family were informed of the results of any tests, a time was given to answer questions, a plan was proposed and they agreed with plan. Barbra Cintron MD    This note was transcribed using [address]. Please disregard any translational errors.

## 2022-11-06 ENCOUNTER — APPOINTMENT (OUTPATIENT)
Dept: GENERAL RADIOLOGY | Age: 77
DRG: 280 | End: 2022-11-06
Payer: MEDICARE

## 2022-11-06 LAB
ANION GAP SERPL CALCULATED.3IONS-SCNC: 10 MMOL/L (ref 3–16)
BUN BLDV-MCNC: 44 MG/DL (ref 7–20)
CALCIUM SERPL-MCNC: 9 MG/DL (ref 8.3–10.6)
CHLORIDE BLD-SCNC: 95 MMOL/L (ref 99–110)
CO2: 37 MMOL/L (ref 21–32)
CREAT SERPL-MCNC: 1.4 MG/DL (ref 0.6–1.2)
GFR SERPL CREATININE-BSD FRML MDRD: 39 ML/MIN/{1.73_M2}
GLUCOSE BLD-MCNC: 109 MG/DL (ref 70–99)
MAGNESIUM: 1.9 MG/DL (ref 1.8–2.4)
POTASSIUM SERPL-SCNC: 4.3 MMOL/L (ref 3.5–5.1)
PRO-BNP: 2677 PG/ML (ref 0–449)
SODIUM BLD-SCNC: 142 MMOL/L (ref 136–145)
TROPONIN: 0.09 NG/ML
TROPONIN: 0.11 NG/ML
TSH REFLEX FT4: 1.24 UIU/ML (ref 0.27–4.2)

## 2022-11-06 PROCEDURE — 6360000002 HC RX W HCPCS: Performed by: NURSE PRACTITIONER

## 2022-11-06 PROCEDURE — 36415 COLL VENOUS BLD VENIPUNCTURE: CPT

## 2022-11-06 PROCEDURE — 6370000000 HC RX 637 (ALT 250 FOR IP): Performed by: NURSE PRACTITIONER

## 2022-11-06 PROCEDURE — 6370000000 HC RX 637 (ALT 250 FOR IP): Performed by: HOSPITALIST

## 2022-11-06 PROCEDURE — 6370000000 HC RX 637 (ALT 250 FOR IP): Performed by: INTERNAL MEDICINE

## 2022-11-06 PROCEDURE — 84484 ASSAY OF TROPONIN QUANT: CPT

## 2022-11-06 PROCEDURE — 99232 SBSQ HOSP IP/OBS MODERATE 35: CPT | Performed by: NURSE PRACTITIONER

## 2022-11-06 PROCEDURE — 2700000000 HC OXYGEN THERAPY PER DAY

## 2022-11-06 PROCEDURE — 80048 BASIC METABOLIC PNL TOTAL CA: CPT

## 2022-11-06 PROCEDURE — 99233 SBSQ HOSP IP/OBS HIGH 50: CPT | Performed by: NURSE PRACTITIONER

## 2022-11-06 PROCEDURE — 71045 X-RAY EXAM CHEST 1 VIEW: CPT

## 2022-11-06 PROCEDURE — 83735 ASSAY OF MAGNESIUM: CPT

## 2022-11-06 PROCEDURE — 2060000000 HC ICU INTERMEDIATE R&B

## 2022-11-06 PROCEDURE — 2580000003 HC RX 258: Performed by: NURSE PRACTITIONER

## 2022-11-06 PROCEDURE — 6360000002 HC RX W HCPCS: Performed by: HOSPITALIST

## 2022-11-06 PROCEDURE — 6360000002 HC RX W HCPCS: Performed by: INTERNAL MEDICINE

## 2022-11-06 PROCEDURE — 93005 ELECTROCARDIOGRAM TRACING: CPT | Performed by: INTERNAL MEDICINE

## 2022-11-06 PROCEDURE — 83880 ASSAY OF NATRIURETIC PEPTIDE: CPT

## 2022-11-06 PROCEDURE — 94640 AIRWAY INHALATION TREATMENT: CPT

## 2022-11-06 PROCEDURE — 84443 ASSAY THYROID STIM HORMONE: CPT

## 2022-11-06 PROCEDURE — 94760 N-INVAS EAR/PLS OXIMETRY 1: CPT

## 2022-11-06 RX ORDER — DILTIAZEM HYDROCHLORIDE 180 MG/1
180 CAPSULE, COATED, EXTENDED RELEASE ORAL DAILY
Status: DISCONTINUED | OUTPATIENT
Start: 2022-11-06 | End: 2022-11-07

## 2022-11-06 RX ORDER — DILTIAZEM HYDROCHLORIDE 5 MG/ML
20 INJECTION INTRAVENOUS ONCE
Status: DISCONTINUED | OUTPATIENT
Start: 2022-11-06 | End: 2022-11-06

## 2022-11-06 RX ORDER — FUROSEMIDE 10 MG/ML
40 INJECTION INTRAMUSCULAR; INTRAVENOUS 3 TIMES DAILY
Status: DISCONTINUED | OUTPATIENT
Start: 2022-11-06 | End: 2022-11-07

## 2022-11-06 RX ADMIN — MOMETASONE FUROATE AND FORMOTEROL FUMARATE DIHYDRATE 2 PUFF: 200; 5 AEROSOL RESPIRATORY (INHALATION) at 07:39

## 2022-11-06 RX ADMIN — ISOSORBIDE MONONITRATE 30 MG: 30 TABLET, EXTENDED RELEASE ORAL at 08:52

## 2022-11-06 RX ADMIN — MOMETASONE FUROATE AND FORMOTEROL FUMARATE DIHYDRATE 2 PUFF: 200; 5 AEROSOL RESPIRATORY (INHALATION) at 20:06

## 2022-11-06 RX ADMIN — SODIUM CHLORIDE, PRESERVATIVE FREE 10 ML: 5 INJECTION INTRAVENOUS at 21:14

## 2022-11-06 RX ADMIN — HYDROMORPHONE HYDROCHLORIDE 0.5 MG: 1 INJECTION, SOLUTION INTRAMUSCULAR; INTRAVENOUS; SUBCUTANEOUS at 09:31

## 2022-11-06 RX ADMIN — DOFETILIDE 250 MCG: 0.25 CAPSULE ORAL at 08:58

## 2022-11-06 RX ADMIN — HYDRALAZINE HYDROCHLORIDE 10 MG: 20 INJECTION INTRAMUSCULAR; INTRAVENOUS at 01:00

## 2022-11-06 RX ADMIN — IPRATROPIUM BROMIDE AND ALBUTEROL SULFATE 1 AMPULE: .5; 3 SOLUTION RESPIRATORY (INHALATION) at 20:06

## 2022-11-06 RX ADMIN — FUROSEMIDE 40 MG: 10 INJECTION, SOLUTION INTRAMUSCULAR; INTRAVENOUS at 08:52

## 2022-11-06 RX ADMIN — IPRATROPIUM BROMIDE AND ALBUTEROL SULFATE 1 AMPULE: .5; 3 SOLUTION RESPIRATORY (INHALATION) at 16:30

## 2022-11-06 RX ADMIN — PREDNISONE 20 MG: 20 TABLET ORAL at 08:52

## 2022-11-06 RX ADMIN — SODIUM CHLORIDE, PRESERVATIVE FREE 10 ML: 5 INJECTION INTRAVENOUS at 08:52

## 2022-11-06 RX ADMIN — FUROSEMIDE 40 MG: 10 INJECTION, SOLUTION INTRAMUSCULAR; INTRAVENOUS at 14:45

## 2022-11-06 RX ADMIN — DILTIAZEM HYDROCHLORIDE 180 MG: 180 CAPSULE, COATED, EXTENDED RELEASE ORAL at 12:24

## 2022-11-06 RX ADMIN — Medication 9 MG: at 21:09

## 2022-11-06 RX ADMIN — CLOPIDOGREL BISULFATE 75 MG: 75 TABLET ORAL at 08:52

## 2022-11-06 RX ADMIN — HYDRALAZINE HYDROCHLORIDE 50 MG: 50 TABLET, FILM COATED ORAL at 21:09

## 2022-11-06 RX ADMIN — OXYMETAZOLINE HCL 2 SPRAY: 0.05 SPRAY NASAL at 08:52

## 2022-11-06 RX ADMIN — RIVAROXABAN 20 MG: 20 TABLET, FILM COATED ORAL at 08:51

## 2022-11-06 RX ADMIN — OXYMETAZOLINE HCL 2 SPRAY: 0.05 SPRAY NASAL at 21:15

## 2022-11-06 RX ADMIN — LISINOPRIL 20 MG: 20 TABLET ORAL at 08:51

## 2022-11-06 RX ADMIN — METOPROLOL SUCCINATE 50 MG: 50 TABLET, EXTENDED RELEASE ORAL at 08:52

## 2022-11-06 RX ADMIN — DOFETILIDE 250 MCG: 0.25 CAPSULE ORAL at 21:09

## 2022-11-06 RX ADMIN — HYDRALAZINE HYDROCHLORIDE 50 MG: 50 TABLET, FILM COATED ORAL at 06:35

## 2022-11-06 RX ADMIN — DIPHENHYDRAMINE HYDROCHLORIDE, ZINC ACETATE: 2; .1 CREAM TOPICAL at 21:14

## 2022-11-06 RX ADMIN — IPRATROPIUM BROMIDE AND ALBUTEROL SULFATE 1 AMPULE: .5; 3 SOLUTION RESPIRATORY (INHALATION) at 07:39

## 2022-11-06 RX ADMIN — IPRATROPIUM BROMIDE AND ALBUTEROL SULFATE 1 AMPULE: .5; 3 SOLUTION RESPIRATORY (INHALATION) at 11:28

## 2022-11-06 RX ADMIN — FUROSEMIDE 40 MG: 10 INJECTION, SOLUTION INTRAMUSCULAR; INTRAVENOUS at 21:09

## 2022-11-06 RX ADMIN — ROSUVASTATIN CALCIUM 20 MG: 20 TABLET, FILM COATED ORAL at 08:51

## 2022-11-06 RX ADMIN — HYDRALAZINE HYDROCHLORIDE 50 MG: 50 TABLET, FILM COATED ORAL at 14:45

## 2022-11-06 ASSESSMENT — PAIN SCALES - GENERAL: PAINLEVEL_OUTOF10: 0

## 2022-11-06 NOTE — PROGRESS NOTES
Pt troponin level this am after change of condition 0.09, and a follow up trop scheduled for 6 hours later, resulting 1.1 RN notified hospitalist at this time.     Electronically signed by Akua Osborn RN on 11/6/2022 at 4:06 PM

## 2022-11-06 NOTE — PROGRESS NOTES
Hospitalist Progress Note  11/6/2022 12:31 PM    PCP: Nancy Thomas MD    [id number]     Date of Admission: 11/2/2022                                                                                                             HOSPITAL COURSE    Patient demographics:  The patient  Ponce Segal is a 69 y.o. female     Significant past medical history:   Patient Active Problem List   Diagnosis    Primary hypertension    Hyperlipidemia    Coronary atherosclerosis due to calcified coronary lesion of native artery    DENISE (obstructive sleep apnea)    History of DVT (deep vein thrombosis)    Family history of DVT    AAA (abdominal aortic aneurysm) without rupture    Pleural effusion, left    Pleural effusion    COPD exacerbation (HCC)    Pelvic pain in female    Vitamin D deficiency    Other emphysema (HCC)    Acute bronchitis    Acute respiratory failure with hypoxia (HCC)    Abnormal CXR    Atypical pneumonia    Atrial fibrillation with RVR (HCC)    Chronic diastolic heart failure (HCC)    PVD (peripheral vascular disease) ([facility])    Abnormal nuclear stress test    AAA (abdominal aortic aneurysm)    PAF (paroxysmal atrial fibrillation) ([facility])    Endoleak post (EVAR) endovascular aneurysm repair, sequela    Carotid artery stenosis without cerebral infarction, bilateral    History of repair of aneurysm of abdominal aorta using endovascular stent graft    Atherosclerotic heart disease of native coronary artery with other forms of angina pectoris (HCC)    Morbidly obese ([address]    Stage 3 severe COPD by GOLD classification ([address]    Hypertensive crisis    Acute on chronic diastolic congestive heart failure (HCC)    Respiratory failure (HCC)    Left atrial flutter by electrocardiogram ([address]    Persistent atrial fibrillation (HCC)    A-fib (HCC)    Obesity (BMI 30-39. 9)    Atrial fibrillation (HCC)    LI (dyspnea on exertion)    Chronic systolic (congestive) heart failure    Pneumonia    Acute on chronic respiratory failure with hypoxia ([facility]    Drug-induced insomnia ([facility]         Presenting symptoms:      Diagnostic workup:      CONSULTS DURING ADMISSION :   IP CONSULT TO HOSPITALIST  IP CONSULT TO PULMONOLOGY  IP CONSULT TO CARDIOLOGY  IP CONSULT TO HEART FAILURE NURSE/COORDINATOR  IP CONSULT TO DIETITIAN  IP CONSULT TO CARDIOLOGY      Patient was diagnosed with:        Treatment while inpatient:  69years old female with medical history significant for atrial fibrillation coronary artery disease CHF severe COPD hypertension. Patient presented to the emergency room with shortness of breath. Patient has baseline chronic respiratory failure with 4 L nasal cannula oxygen. Patient was diagnosed with acute on chronic respiratory failure due to COPD exacerbation.                                                                                  ----------------------------------------------------------      SUBJECTIVE COMPLAINTS-     Diet: ADULT DIET;  Regular; Low Fat/Low Chol/High Fiber/DOMENICA; Low Sodium (2 gm); 1200 ml      OBJECTIVE:   Patient Active Problem List   Diagnosis    Primary hypertension    Hyperlipidemia    Coronary atherosclerosis due to calcified coronary lesion of native artery    DENISE (obstructive sleep apnea)    History of DVT (deep vein thrombosis)    Family history of DVT    AAA (abdominal aortic aneurysm) without rupture    Pleural effusion, left    Pleural effusion    COPD exacerbation (HCC)    Pelvic pain in female    Vitamin D deficiency    Other emphysema ([facility])    Acute bronchitis    Acute respiratory failure with hypoxia ([facility])    Abnormal CXR    Atypical pneumonia    Atrial fibrillation with RVR ([facility])    Chronic diastolic heart failure ([facility])    PVD (peripheral vascular disease) ([facility])    Abnormal nuclear stress test    AAA (abdominal aortic aneurysm)    PAF (paroxysmal atrial fibrillation) ([facility])    Endoleak post (EVAR) endovascular aneurysm repair, sequela    Carotid artery stenosis without cerebral infarction, bilateral    History of repair of aneurysm of abdominal aorta using endovascular stent graft    Atherosclerotic heart disease of native coronary artery with other forms of angina pectoris ([facility]    Morbidly obese (HCC)    Stage 3 severe COPD by GOLD classification ([facility]    Hypertensive crisis    Acute on chronic diastolic congestive heart failure (HCC)    Respiratory failure (HCC)    Left atrial flutter by electrocardiogram ([facility]    Persistent atrial fibrillation (HCC)    A-fib (HCC)    Obesity (BMI 30-39. 9)    Atrial fibrillation (HCC)    LI (dyspnea on exertion)    Chronic systolic (congestive) heart failure    Pneumonia    Acute on chronic respiratory failure with hypoxia (HCC)    Drug-induced insomnia (HCC)       Allergies  Morphine    Medications    Scheduled Meds:   influenza virus vaccine  0.5 mL IntraMUSCular Prior to discharge    furosemide  40 mg IntraVENous TID    dilTIAZem  180 mg Oral Daily    predniSONE  20 mg Oral Daily    hydrALAZINE  50 mg Oral 3 times per day    metoprolol succinate  50 mg Oral Daily    sodium chloride flush  10 mL IntraVENous 2 times per day    ipratropium-albuterol  1 ampule Inhalation Q4H WA    clopidogrel  75 mg Oral Daily    dofetilide  250 mcg Oral Q12H    isosorbide mononitrate  30 mg Oral Daily    [Held by provider] lisinopril  20 mg Oral Daily    rivaroxaban  20 mg Oral Daily with breakfast    rosuvastatin  20 mg Oral Daily    mometasone-formoterol  2 puff Inhalation BID     Continuous Infusions:   sodium chloride       PRN Meds:  HYDROmorphone, potassium chloride **OR** potassium alternative oral replacement **OR** potassium chloride, oxymetazoline, diphenhydrAMINE-zinc acetate, hydrALAZINE, melatonin, sodium chloride flush, sodium chloride, magnesium hydroxide, acetaminophen **OR** acetaminophen, albuterol    Vitals   Vitals /wt Patient Vitals for the past 8 hrs:   BP Temp Temp src Pulse Resp SpO2   11/06/22 1155 (!) 149/51 98.1 °F (36.7 °C) Oral 64 18 100 %   11/06/22 1100 -- -- -- -- -- 97 %   11/06/22 1001 -- -- -- -- 22 --   11/06/22 0930 -- -- -- (!) 147 23 96 %   11/06/22 0913 (!) 150/58 -- -- (!) 129 (!) 34 --   11/06/22 0807 (!) 164/57 98.3 °F (36.8 °C) Oral 62 15 99 %   11/06/22 0743 -- -- -- 67 29 --   11/06/22 0730 -- -- -- 68 18 99 %   11/06/22 0635 (!) 193/66 -- -- 66 -- --          72HR INTAKE/OUTPUT:    Intake/Output Summary (Last 24 hours) at 11/6/2022 1231  Last data filed at 11/6/2022 0815  Gross per 24 hour   Intake 854.41 ml   Output 3150 ml   Net -2295.59 ml         Exam:    Gen:   Alert and oriented ×3    Eyes: PERRL. No sclera icterus. No conjunctival injection. ENT: No discharge. Pharynx clear. External appearance of ears and nose normal.  Neck: Trachea midline. No obvious mass. Resp: No accessory muscle use. No crackles. No wheezes. No rhonchi. CV: Regular rate. Regular rhythm. No murmur or rub. No edema. GI: Non-tender. Non-distended. No hernia. Skin: Warm, dry, normal texture and turgor. Lymph: No cervical LAD. No supraclavicular LAD. M/S: / Ext. No cyanosis. No clubbing. No joint deformity. Neuro: CN 2-12 are intact,  no neurologic deficits noted. PT/INR:   Recent Labs     11/04/22  1542 11/05/22 0446   PROTIME 26.7* 17.0*   INR 2.45* 1.38*       APTT: No results for input(s): APTT in the last 72 hours. CBC:   Recent Labs     11/04/22  0452 11/05/22 0446   WBC 15.0* 15.7*   HGB 9.3* 9.1*   HCT 30.0* 28.9*   MCV 90.1 88.1    215         BMP:   Recent Labs     11/04/22  0452 11/05/22  0446 11/06/22  0446    139 142   K 4.5 3.8 4.3   CL 92* 92* 95*   CO2 37* 39* 37*   BUN 37* 43* 44*   CREATININE 1.3* 1.5* 1.4*         LIVER PROFILE:   No results for input(s): ALKPHOS, AST, ALT, ALB, BILIDIR, BILITOT, ALKPHOS in the last 72 hours. No results for input(s): AMYLASE in the last 72 hours. No results for input(s): LIPASE in the last 72 hours.     UA:  No results for input(s): NITRITE, LABCAST, WBCUA, RBCUA, MUCUS in the last 72 hours. TROPONIN:   Recent Labs     11/06/22  0944   TROPONINI 0.09*         Lab Results   Component Value Date/Time    URRFLXCULT Not Indicated 11/02/2022 07:29 PM       No results for input(s): TSHREFLEX in the last 72 hours. No components found for: [id number]  POC GLUCOSE:    Recent Labs     11/04/22  0947   POCGLU 151*       No results for input(s): LABA1C in the last 72 hours. Lab Results   Component Value Date/Time    LABA1C 5.6 05/23/2019 02:38 PM         ASSESSMENT AND PLAN    Acute on chronic respiratory failure. - with increased work of breath, tachypnea and hypoxia  - home 4 liters saturation 67%  -Titrate oxygen for saturations greater than or equal to 90%    COPD Exacerbation  Steroids and bronchodilators and antibiotics  bronchial higiene  Consult to pulmonary     Acute on chronic diastolic CHF  Ejection fraction 70% and grade 2 diastolic dysfunction  - cont lasix, BB, ACEi  - daily wts and I/Os  Cardiology is following  Right heart cath done 11/4 positive for pulmonary hypertension      CAD with PCI and JORJE 9/20/2022  - Successful PCI of proximal Lcx with DESx2  - continue bb, acei, imdur, hydralazine and nitro prn  - continue asa, plavix and statin     Elevated troponin secondary to type II NSTEMI  Atrial Fibrillation   - currently with RVR  - continue Xarelto, metoprolol and dofetilide   - cardiology consulted this am, cardizem po added      Hypertensive urgency at the time of admission  -Blood pressure control has improved  Continue lisinopril and Toprol-XL    Debility  PT OT    Code Status: Full Code        Dispo - cc        The patient and / or the family were informed of the results of any tests, a time was given to answer questions, a plan was proposed and they agreed with plan.     Nikko Estrada MD

## 2022-11-06 NOTE — PROGRESS NOTES
RN at bedside when pt suddenly c/o sharp chest pain 10/10 with pain radiating down right arm as well. No SOB noted. Pt stated \"im having a heart attack! \". HR between 121-148 and pt  extremely concerned about it. RN reassured pt and her frantic  and reminded them that she has a history of a-fib so the heart rate was not #1 priority but would be addressed after ABCs were stable. RN remained at bedside and notified Dr Rito Henry who arrived at bedside within 2 minutes. New orders received for STAT EKG, CXR, trop level, cardiology consult, diltiazem, and dilaudid for pain. Pt and  satisfied with attention received. EKG obtained and Dr Rito Henry aware and signed strip. Pt returned to NSR within a few minutes and given PRN pain medicine. Upon f/u, pt satisfied and expressed gratitude towards staff for attentiveness.     Electronically signed by Cali Hoffman RN on 11/6/2022 at 4:40 PM

## 2022-11-06 NOTE — PLAN OF CARE
Problem: Discharge Planning  Goal: Discharge to home or other facility with appropriate resources  11/6/2022 1239 by Gil August RN  Outcome: Progressing  Flowsheets (Taken 11/6/2022 0807)  Discharge to home or other facility with appropriate resources:   Identify barriers to discharge with patient and caregiver   Arrange for needed discharge resources and transportation as appropriate   Identify discharge learning needs (meds, wound care, etc)     Problem: Safety - Adult  Goal: Free from fall injury  11/6/2022 1239 by Gil August RN  Outcome: Progressing     Problem: Chronic Conditions and Co-morbidities  Goal: Patient's chronic conditions and co-morbidity symptoms are monitored and maintained or improved  11/6/2022 1239 by Gil August RN  Outcome: Progressing  [address] (Taken 11/6/2022 0807)  Care Plan - Patient's Chronic Conditions and Co-Morbidity Symptoms are Monitored and Maintained or Improved:   Monitor and assess patient's chronic conditions and comorbid symptoms for stability, deterioration, or improvement   Collaborate with multidisciplinary team to address chronic and comorbid conditions and prevent exacerbation or deterioration   Update acute care plan with appropriate goals if chronic or comorbid symptoms are exacerbated and prevent overall improvement and discharge     Problem: Pain  Goal: Verbalizes/displays adequate comfort level or baseline comfort level  11/6/2022 1239 by Gil August RN  Outcome: Progressing     Problem: Respiratory - Adult  Goal: Achieves optimal ventilation and oxygenation  11/6/2022 1239 by Gil August RN  Outcome: Progressing  Flowsheets (Taken 11/6/2022 0807)  Achieves optimal ventilation and oxygenation:   Assess for changes in respiratory status   Assess for changes in mentation and behavior   Position to facilitate oxygenation and minimize respiratory effort   Oxygen supplementation based on oxygen saturation or arterial blood gases     Problem: Cardiovascular - Adult  Goal: Maintains optimal cardiac output and hemodynamic stability  11/6/2022 1239 by Dara Mcmahon RN  Outcome: Progressing  Flowsheets (Taken 11/6/2022 0807)  Maintains optimal cardiac output and hemodynamic stability:   Monitor blood pressure and heart rate   Monitor urine output and notify Licensed Independent Practitioner for values outside of normal range   Assess for signs of decreased cardiac output     Problem: Cardiovascular - Adult  Goal: Absence of cardiac dysrhythmias or at baseline  11/6/2022 1239 by Dara Mcmahon RN  Outcome: Progressing  Flowsheets (Taken 11/6/2022 0807)  Absence of cardiac dysrhythmias or at baseline:   Monitor cardiac rate and rhythm   Assess for signs of decreased cardiac output   Administer antiarrhythmia medication and electrolyte replacement as ordered     Problem: Metabolic/Fluid and Electrolytes - Adult  Goal: Electrolytes maintained within normal limits  11/6/2022 1239 by Dara Mcmahon RN  Outcome: Progressing  [address] (Taken 11/6/2022 0807)  Electrolytes maintained within normal limits:   Monitor labs and assess patient for signs and symptoms of electrolyte imbalances   Administer electrolyte replacement as ordered   Fluid restriction as ordered     Problem: Metabolic/Fluid and Electrolytes - Adult  Goal: Hemodynamic stability and optimal renal function maintained  11/6/2022 1239 by Dara Mcmahon RN  Outcome: Progressing  Flowsheets (Taken 11/6/2022 0807)  Hemodynamic stability and optimal renal function maintained:   Monitor intake, output and patient weight   Monitor labs and assess for signs and symptoms of volume excess or deficit   Monitor response to interventions for patient's volume status, including labs, urine output, blood pressure (other measures as available)   Encourage oral intake as appropriate     Problem: Skin/Tissue Integrity - Adult  Goal: Skin integrity remains intact  Outcome: Progressing  Flowsheets  Taken 11/6/2022 1235  Skin Integrity Remains Intact: Monitor for areas of redness and/or skin breakdown  Taken 11/6/2022 0807  Skin Integrity Remains Intact: Monitor for areas of redness and/or skin breakdown     Problem: Musculoskeletal - Adult  Goal: Return mobility to safest level of function  Outcome: Progressing     Problem: Musculoskeletal - Adult  Goal: Return ADL status to a safe level of function  Outcome: Progressing     Problem: Gastrointestinal - Adult  Goal: Minimal or absence of nausea and vomiting  Outcome: Progressing     Problem: Gastrointestinal - Adult  Goal: Maintains adequate nutritional intake  Outcome: Progressing     Problem: Genitourinary - Adult  Goal: Absence of urinary retention  Outcome: Progressing     Problem: Genitourinary - Adult  Goal: Urinary catheter remains patent  Outcome: Progressing     Problem: ABCDS Injury Assessment  Goal: Absence of physical injury  11/6/2022 1239 by Jayda Zamora RN  Outcome: Progressing  Flowsheets (Taken 11/6/2022 1235)  Absence of Physical Injury: Implement safety measures based on patient assessment     Problem: Skin/Tissue Integrity  Goal: Absence of new skin breakdown  Description: 1. Monitor for areas of redness and/or skin breakdown  2. Assess vascular access sites hourly  3. Every 4-6 hours minimum:  Change oxygen saturation probe site  4. Every 4-6 hours:  If on nasal continuous positive airway pressure, respiratory therapy assess nares and determine need for appliance change or resting period.   11/6/2022 1239 by Jayda Zamora RN  Outcome: Progressing     Problem: Discharge Planning  Goal: Discharge to home or other facility with appropriate resources  11/6/2022 1239 by Jayda Zamora RN  Outcome: Progressing  Flowsheets (Taken 11/6/2022 0807)  Discharge to home or other facility with appropriate resources:   Identify barriers to discharge with patient and caregiver   Arrange for needed discharge resources and transportation as appropriate   Identify discharge learning needs (meds, wound care, etc)  11/6/2022 0235 by Iliana Carson RN  Outcome: Not Progressing  Flowsheets (Taken 11/5/2022 2110)  Discharge to home or other facility with appropriate resources: Identify barriers to discharge with patient and caregiver     Problem: Chronic Conditions and Co-morbidities  Goal: Patient's chronic conditions and co-morbidity symptoms are monitored and maintained or improved  11/6/2022 1239 by Juan Leiva RN  Outcome: Progressing  [address] (Taken 11/6/2022 0807)  Care Plan - Patient's Chronic Conditions and Co-Morbidity Symptoms are Monitored and Maintained or Improved:   Monitor and assess patient's chronic conditions and comorbid symptoms for stability, deterioration, or improvement   Collaborate with multidisciplinary team to address chronic and comorbid conditions and prevent exacerbation or deterioration   Update acute care plan with appropriate goals if chronic or comorbid symptoms are exacerbated and prevent overall improvement and discharge  11/6/2022 0235 by Iliana Carson RN  Outcome: Not Progressing  Flowsheets (Taken 11/5/2022 2110)  Care Plan - Patient's Chronic Conditions and Co-Morbidity Symptoms are Monitored and Maintained or Improved: Monitor and assess patient's chronic conditions and comorbid symptoms for stability, deterioration, or improvement     Problem: Metabolic/Fluid and Electrolytes - Adult  Goal: Hemodynamic stability and optimal renal function maintained  11/6/2022 1239 by Juan Leiva RN  Outcome: Progressing  Flowsheets (Taken 11/6/2022 0807)  Hemodynamic stability and optimal renal function maintained:   Monitor intake, output and patient weight   Monitor labs and assess for signs and symptoms of volume excess or deficit   Monitor response to interventions for patient's volume status, including labs, urine output, blood pressure (other measures as available)   Encourage oral intake as appropriate  11/6/2022 0235 by Ivette Curran RN  Outcome: Not Progressing  Flowsheets (Taken 11/5/2022 2110)  Hemodynamic stability and optimal renal function maintained:   Monitor labs and assess for signs and symptoms of volume excess or deficit   Monitor intake, output and patient weight   Monitor response to interventions for patient's volume status, including labs, urine output, blood pressure (other measures as available)   Encourage oral intake as appropriate   Instruct patient on fluid and nutrition restrictions as appropriate  Note: Cr increased from 1.3 to 1.5. Frequency increased on pt's IVP 40 mg lasix to T.I.D. Problem: Skin/Tissue Integrity  Goal: Absence of new skin breakdown  Description: 1. Monitor for areas of redness and/or skin breakdown  2. Assess vascular access sites hourly  3. Every 4-6 hours minimum:  Change oxygen saturation probe site  4. Every 4-6 hours:  If on nasal continuous positive airway pressure, respiratory therapy assess nares and determine need for appliance change or resting period. 11/6/2022 1239 by Tracey Garvey RN  Outcome: Progressing  11/6/2022 0235 by Gini Grace RN  Outcome: Not Progressing  Note: Pt c/o itching and has multiple areas that she has scratched until they have bled on her BUE. Pt removed the mepilex that was covering one of the open areas on her L upper arm. Pt said, \"I pick at things. \" Pt asking for a cream to help with itching. RN paged and notified Tomás NP of pt's request. Anti-itching cream ordered for pt and administered. Pt instructed on use and VU/DU.

## 2022-11-06 NOTE — PROGRESS NOTES
[address]   Daily Progress Note      Admit Date:  11/2/2022    CC: SOB    HPI:   Sonia Larson is a 69 y.o. female with PMH CAD, AF s/p PVI ablation 10/2020- recurrent AF with DCCV 1/2021 and repeat AF ablation 2/2021 (failed flecaininde), HTN, aortic aneurysm, AAA s/p repair 3/2018 (Dr. Shannan Sheehan, DENISE (intolerant to CPAP). Suly Susana stress>[facility] 3/2018 revealed  of RCA and non obstructive dz of LAD and LCX. Adm to [facility] 5/2022 for dofetilide loading, converted to NSR. Multiple admissions for resp failure and heart failure over last year. Followed by myself and Dr. Osmin Smith. Had abn stress followed by [address] [place] [address] 9/20/2022 showing CAD -   She was previously considered for a cardiomems PA sensor but that has been on hold per Dr. Osmin Smith. Adm to [facility] with acute hypoxic resp failure. O2 sat down to 60%. Associated with hypertension. Cardiology, Dr. Osmin Smith, consulted for HF. Started on IV lasix and diuresing well. SOB was improving, however hard to tell her volume status with COPD exacerbation. Underwent RHC 11/4 which showed she was hypervolemic -continued diuretics. Overnight 11/4>11/5 her HR increased 150-160s. Dr. Linus Hodge was notified. He spoke to EP and started on cardizem gtt. AM 11/5 her HR had improved, weaned cardizem gtt off. She did well yesterday. Tele showed a few brief episodes of AF that lasted a few seconds. This AM around 9am she started with AF RVR again -160s -going in and out with NSR 70s  She is symptomatic with palpitations and chest discomfort that resolves when she goes back into NSR. Decreased SOB - at her baseline.  + fatigue and generalized weakness. Review of Systems:   General: Denies fever, chills  Skin: Denies skin changes, rash, itching, lesions.   HEENT: Denies headache, dizziness  RESP: Denies cough, sputum,  wheeze, snoring  CARD: Denies palpitations,  murmur  GI:Denies nausea, vomiting, heartburn, loss of appetite, change in bowels  : Denies frequency, pain  VASC: Denies claudication, leg cramps, clots  MUSC/SKEL: Denies pain, stiffness, arthritis  PSYCH: Denies anxiety, depression, stress  NEURO: Denies numbness, tingling, weakness,change in mood or memory  HEME: Denies abn bruising, bleeding, anemia  ENDO: Denies intolerance to heat, cold, excessive thirst or hunger, hx thyroid disease    Objective:   BP (!) 150/58   Pulse (!) 147   Temp 98.3 °F (36.8 °C) (Oral)   Resp 23   Ht 5' 4\" (1.626 m)   Wt 229 lb 11.5 oz (104.2 kg)   SpO2 96%   BMI 39.43 kg/m²         Intake/Output Summary (Last 24 hours) at 11/6/2022 1004  Last data filed at 11/6/2022 0815  Gross per 24 hour   Intake 854.41 ml   Output 3150 ml   Net -2295.59 ml     I/O since adm: Neg 6.3L    WEIGHT:Admit Weight: 234 lb 9.1 oz (106.4 kg)         Today  Weight: 229 lb 11.5 oz (104.2 kg)   DRY WEIGHT:  Wt Readings from Last 3 Encounters:   11/06/22 229 lb 11.5 oz (104.2 kg)   10/13/22 228 lb (103.4 kg)   09/29/22 219 lb (99.3 kg)       Physical Exam:  GEN: Appears obese, no acute distress  SKIN: Pink, warm, dry. HEENT: PERRLA. No adenopathy. LUNG: AP diameter normal. Crackles bilateral bases, few exp wheeze. Respiratory effort at baseline on 4L NC.  HEART: S1S2 A/R. No JVD. No carotid bruit. No murmur, rub or gallop. ABD: Soft, nontender. +BS X 4 quads. No hepatomegaly. EXT: Radial and pedal pulses 2+ and symmetric. Without varicosities. Trace BLE edema. MUSCSKEL: Good ROM X4 extremities. No deformity. NEURO: A/O X3. Calm and cooperative.      Telemetry: NSR/SB    Medications:    influenza virus vaccine  0.5 mL IntraMUSCular Prior to discharge    dilTIAZem  20 mg IntraVENous Once    furosemide  40 mg IntraVENous TID    predniSONE  20 mg Oral Daily    hydrALAZINE  50 mg Oral 3 times per day    metoprolol succinate  50 mg Oral Daily    sodium chloride flush  10 mL IntraVENous 2 times per day    ipratropium-albuterol  1 ampule Inhalation Q4H WA    clopidogrel  75 mg Oral Daily dofetilide  250 mcg Oral Q12H    isosorbide mononitrate  30 mg Oral Daily    [Held by provider] lisinopril  20 mg Oral Daily    rivaroxaban  20 mg Oral Daily with breakfast    rosuvastatin  20 mg Oral Daily    mometasone-formoterol  2 puff Inhalation BID      sodium chloride       HYDROmorphone, potassium chloride **OR** potassium alternative oral replacement **OR** potassium chloride, oxymetazoline, diphenhydrAMINE-zinc acetate, hydrALAZINE, melatonin, sodium chloride flush, sodium chloride, magnesium hydroxide, acetaminophen **OR** acetaminophen, albuterol    Lab Data: I have reviewed all labs below today.    CBC:   Recent Labs     11/04/22 0452 11/05/22 0446   WBC 15.0* 15.7*   HGB 9.3* 9.1*   HCT 30.0* 28.9*   MCV 90.1 88.1    215     BMP:   Recent Labs     11/04/22 0452 11/05/22 0446 11/06/22  0446    139 142   K 4.5 3.8 4.3   CL 92* 92* 95*   CO2 37* 39* 37*   BUN 37* 43* 44*   CREATININE 1.3* 1.5* 1.4*     GLUCOSE:   Recent Labs     11/04/22 0452 11/05/22 0446 11/06/22  0446   GLUCOSE 101* 109* 109*     LIVER PROFILE:   Lab Results   Component Value Date/Time    AST 15 11/02/2022 07:29 PM    ALT 9 11/02/2022 07:29 PM    LIPASE 43.0 06/02/2015 01:41 AM    AMYLASE 34 10/02/2013 11:46 AM    LABALBU 3.9 11/02/2022 07:29 PM    BILIDIR <0.2 07/14/2015 07:24 AM    BILITOT 0.3 11/02/2022 07:29 PM    ALKPHOS 64 11/02/2022 07:29 PM     PT/INR:   Lab Results   Component Value Date/Time    PROTIME 17.0 11/05/2022 04:46 AM    INR 1.38 11/05/2022 04:46 AM    INR 2.45 11/04/2022 03:42 PM    INR 1.33 11/03/2022 04:18 AM     APTT:   Lab Results   Component Value Date/Time    APTT 37.2 02/15/2020 06:28 PM     Pro-BNP:    Lab Results   Component Value Date/Time    PROBNP 2,677 11/06/2022 04:46 AM    PROBNP 5,478 11/04/2022 04:52 AM    PROBNP 3,858 11/02/2022 07:29 PM     Parameters:   > 450 pg/mL under age 48  > 900 pg/mL ages 54-65  > 1800 pg/mL over age 76    ENZYMES:  Lab Results   Component Value Date/Time TROPONINI 0.03 11/03/2022 07:02 AM    TROPONINI 0.04 11/03/2022 04:18 AM    TROPONINI 0.04 11/02/2022 07:29 PM     FASTING LIPID PANEL:  Lab Results   Component Value Date/Time    CHOL 107 09/10/2021 07:22 AM    HDL 56 09/10/2021 07:22 AM    HDL 38 09/09/2011 03:35 PM    LDLCALC 39 09/10/2021 07:22 AM    TRIG 59 09/10/2021 07:22 AM       Diagnostics:    EKG:   [facility]: ([facility]) 4/2017   of RCA chronic LAD 10-20% RA 4 PA24/9 16 wedge 9 LVEDP markedly elevated 30     Carotid US 10/2017 at [facility]:  1. Estimated diameter reduction of the right internal carotid artery is  less than 50%. 2.  Estimated diameter reduction of the left internal carotid artery is  50-69%. 3.  The bilateral common carotid arteries reveal no evidence of   significant stenosis. 4.  There is greater than 50% stenosis of the right external carotid  artery. 5.  There is no evidence of significant stenosis in the left external  carotid artery. 6.  The bilateral vertebral arteries are patent with antegrade flow. 7.  The bilateral subclavian arteries reveal no evidence of significant  stenosis. 8.  There has been no significant change from the previous study of  4/21/2017. Echo 1/9/2018:  Mild concentric left ventricular hypertrophy. Visually estimated ejection fraction of 65%. Mitral annular calcification. Mild left atrial dilation. Mild aortic stenosis. (mean gradients 63SMUM)  The systolic pulmonary artery pressure (SPAP) estimated at 30 mmHg  (estimated RA pressure of 8 mmHg included). Lexiscan 2/19/18   Summary    Abnormal study. There is a moderate sized, mild intensity, partially    reversible defect of the mid to apical anterior and mid anterolateral walls    which is consistent with ischemia. There is breast attenuation but stress    images appear worse. Normal LV size and systolic function. Overall findings represent an intermediate risk study      Cardiac Cath 3/5/18  OVERALL IMPRESSION  1.   Again, 100% proximal right coronary artery occlusion with left to right  collaterals. 2.  Mild disease of the distal left main trunk. 3.  20% to 30% ostial left anterior descending artery stenosis with  collaterals from LAD to the right coronary artery. 4.  30% to 40% stenosis of the proximal circumflex artery. 5.  Normal left ventricular systolic function with estimated EF of 55% to  60%. 6.  Slightly enlarged aortic root with no evidence of aortic stenosis or  regurgitation. In view of the above findings, we will okay the patient for her upcoming  aortic aneurysm surgery from cardiac standpoint. ECHO 1/2/20  There is moderate concentric left ventricular hypertrophy. Patient appears to be in atrial fibrillation. Ejection fraction is estimated to be 50-60%. Indeterminate diastolic function. Mild aortic regurgitation. Mild tricuspid regurgitation. Mild mitral regurgitation with mitral annular calcification     Right Heart Cath 2/28/2020  1.  _____ normal right cardiac pressure. 2.  Elevated pulmonary capillary wedge pressure with a mean pulmonary  capillary wedge of 29 mmHg. 3.  Normal cardiac output and indices. 4.  No oxygen step off to suggest ASD/PFO. In view of the above findings, we will start the patient on a small dose  of diuretic therapy and see whether we need to adjust some of her  antihypertensive medicines or not. Her findings are consistent with a  diastolic heart failure. ECHO 9/10/2021  Summary   Mod conc. LVH with normal LV size & wall motion. EF is   60%. Diastolic   filling parameters suggest grade II diastolic dysfunction. The left atrium is severely dilated. Normal right ventricular size and function. Trivial mitral, aortic and tricuspid regurgitation.     9/6/2022 Nm Stress   Summary    Abnormal myocardial perfusion study    Moderate sized moderate intensity mid to distal anterior, chad-lateral    reversible defect suggestive of ischemia    Reversible mid to base inferior wall defect Possible suggestion of 2 V region of ischemia    Overall findings represent a intermediate-high risk study. Suggest [facility] 9/20/2022  CORONARY ANGIOGRAPHY:  1. Left main trunk: It arises from the left sinus of Valsalva. It  divides into LAD and circumflex artery. Left main trunk has mild  disease, but it is not hemodynamically significant. 2.  Left anterior descending artery: It arises from left main trunk. It is a tortuous artery and there appears to be a mild plaque of the  ostium. Remainder of the LAD appears free of atherosclerosis. 3.  Left circumflex artery:  The left circumflex artery is a large  artery and it has a 75% stenosis in the proximal segment. There is mild  disease in the obtuse marginal branch distally. There are collaterals  being supplied from the coronary system to the right coronary artery. 4.  RCA:  It arises from the right sinus of Valsalva. It is diffusely  diseased in the proximal and mid segment and it is completely blocked  after that. 5.  Left ventriculogram reveals a preserved left ventricular systolic  function with estimated EF of 55% to 60%. OVERALL IMPRESSION:  1. Mildly elevated right heart pressures. 2.  Normal cardiac output and indices. 3.  No oxygen step-up to suggest ASD or PFO. 4.  Patent left main trunk with mild disease. 5.  Tortuous LAD with mild osteal disease, not hemodynamically  significant. 6.  75% stenosis of the proximal circumflex artery with some mild  disease of the obtuse marginal branch, not hemodynamically significant. 7.  Left-to-right collaterals. 8.  100% occlusion of mid RCA. 9.  Normal left ventricular systolic function with preserved EF of 65%  to 70%. [facility]/PCI w Dr. Darshana Garrison 9/20/2022  Interventions:  PCI performed to the proximal Lcx with DESx2  Resolute [place] 3.5x22 mm  Resolute [place] 3. 5x8 mm  Excellent post angiographic result with BALWINDER 3 flow   Findings:     Left Main  Distal non obstructive 30% disease, evaluated by IVUS MLA > 7.5 mm2   LAD  Moderate diffuse disease   Circ  Proximal vessel 75-80% stenosis correlating with positive functional study   Mid vessel non-obstructive disease 50%   RCA   with L-R collaterals         Interventions/Vessels  PCI performed to the proximal Lcx with DESx2   Resolute [place] 3.5x22 mm   Resolute [place] 3. 5x8 mm   Excellent post angiographic result with BALWINDER 3 flow   Guides/Wires  XB 3.5 guide catheter   Terumo RunThrough   Balanced Heavy Weight   Devices  Opticross [place] IVUS   Post % Stenosis  0   Closure Device  Perclose R CFA   Complications  None      Conclusion:   Successful PCI of proximal Lcx with DESx2  Plavix loaded in cath lab  Femoral artery perclosed with excellent hemostasis  3H bedrest  Home this evening, follow up in 1-2 weeks     ECHO 11/3/2022  Summary  Borderline conc. LVH with normal wall motion; EF ~70%. Grade II diastolic dysfunction with elevated LH filling pressures. The left atrium is severely dilated. The right ventricle is normal in size and function. Mild mitral, aortic and tricuspid regurgitation. Impression   Mild pulmonary vascular congestion. Stable mild cardiomegaly. RHC 11/4/2022  RA 5  RV 53/9  PA 49/20 (28)  PCWP25  CI 3.8    Assessment/Plan:     1.) Acute on chronic diastolic heart failure, Gr II, LVEF 70%, mild MR, AR, TR: Exacerbated by hypertensive urgency on admission. RHC with elevated L heart pressures PCWP 25, PAD 20. Continue diuresis. NYHA Class III              Stage C  Diuretic: lasix 40mg IV TID  Cont lisinopril, toprol XL  MRA, SGLT2i not necessarily indicated for HFpEF. Cardiac Rehab for EF <35%: NA, EF 70%. 2gm Na diet, daily weight, 64 oz fluid restriction  Avoid NSAIDS and other nephrotoxic meds  [facility]: OP  Plan for Cardiomems PA sensor as OP    2.) Paroxysmal Atrial Fib: s/p atrial fib and L flutter ablations, failed flecainide, now on dofetilide.   She is going in/out of AF RVR and NSR.   Controlled for ~ 24 hours. She has been given her AM toprol xl (dose increased yesterday to 50mg) and I suspect that will help. Theoretically should last 24hrs but may not be. Will add cardizem and space out with toprol. Ask EP to see in AM as they have been following her. [place] for improvement when euvolemic. CHADSVASC- 6    HASBLED- 3 (high risk for bleed but has not had any bleeding episodes)  Thromboembolic risk reduction: xarelto  Keep Mag>2, K >4. Previous normal TSH- last checked 2019, will recheck    3.) Acute Chest pain: Resolved today. Seems like she was feeling palpitations and not actual CP. Correlates with onset of AF. EKG without any ischemia. She refuses to get any more labs this morning and I don't feel strongly that would be helpful. She had mild elevated trop on admission attributed to HF.     4.) Hypertension: Urgency on adm. Now improving with AM meds. Expect more improvement with cardizem CD. Goal BP <130/80. Non pharmacologic interventions include:  -weight loss  -heart healthy low sodium and low fat diet that consist of mostly fruits, vegetables and grains (Dash diet)  -limited amount of alcohol (no more than 1 drink/day for women, 2 drinks/day for men)  -regular physical activity  -no smoking  -stress reduction     5.) CAD: Hx  RCA, LHC 9/20 with DESX2 to LCX. Stable. Cont GDMT.   DAPT: plavix and eliquis  Beta Blocker: toprol XL   ACEi/ARB:lisinopril  Anti anginal:   Lipid management/high intensity statin: crestor  Risk factor management: high blood pressure, high cholesterol, Diabetes, smoking, obesity, family hx  Lifestyle modification: Heart healthy diet, regular exercise, weight loss, smoking cessation, stress reduction    Electronically signed by CHRISTINE Balderrama CNP on 11/6/2022 at 10:04 AM

## 2022-11-06 NOTE — PROGRESS NOTES
Pt requested to get flu shot prior to discharge as well as COVID booster. RN ordered flu vaccine per protocol. RN asked pt to follow up with day shift provider about COVID vaccine booster. Pt said this would be her second booster. Pt VU and said she would talk with her day shift provider about the booster.

## 2022-11-06 NOTE — PLAN OF CARE
Problem: Discharge Planning  Goal: Discharge to home or other facility with appropriate resources  Outcome: Not Progressing     Problem: Chronic Conditions and Co-morbidities  Goal: Patient's chronic conditions and co-morbidity symptoms are monitored and maintained or improved  Outcome: Not Progressing     Problem: Metabolic/Fluid and Electrolytes - Adult  Goal: Hemodynamic stability and optimal renal function maintained  Outcome: Not Progressing  Note: Cr increased from 1.3 to 1.5. Frequency increased on pt's IVP 40 mg lasix to T.I.D. Problem: Skin/Tissue Integrity  Goal: Absence of new skin breakdown  Description: 1. Monitor for areas of redness and/or skin breakdown  Outcome: Not Progressing  Note: Pt c/o itching and has multiple areas that she has scratched until they have bled on her BUE. Pt removed the mepilex that was covering one of the open areas on her L upper arm. Pt said, \"I pick at things. \" Pt asking for a cream to help with itching. RN paged and notified Puthoff NP of pt's request. Anti-itching cream ordered for pt and administered. Pt instructed on use and VU/DU. Problem: Safety - Adult  Goal: Free from fall injury  Outcome: Progressing  Note: Pt is a high fall risk. RN placed a fall risk bracelet on pt and reviewed fall risk precautions. Fall risk sign and socks in place. Pt VU/DU. Bed in locked, low position with side rails up X 3 and an active bed alarm. Problem: Pain  Goal: Verbalizes/displays adequate comfort level or baseline comfort level  Outcome: Progressing  Note: Pt denies pain. Problem: Respiratory - Adult  Goal: Achieves optimal ventilation and oxygenation  Outcome: Progressing  Note: Pt having epistaxis and requesting nasal spray. She is on 4 L NC with humidification. RT rounded and changed pt to a venturi mask. Pt tolerating mask well. RN notified Puthoff NP about pt's request and she ordered afrin 2 sprays in each nare.  RN assisted pt with administering because she had difficulty using the spray bottle. Rn also provided pt with water based lubricating gel and cotton swabs to put in nose.

## 2022-11-06 NOTE — FLOWSHEET NOTE
Pt's SBP in 170's and she is asymptomatic. She c/o feeling anxious but didn't know why she was feeling that way. RN administered hydralazine 10mg IVP. Pt's SBP < 160. See below for before and after HR/BP.         11/06/22 0055 11/06/22 0109   Vital Signs   Heart Rate 64 67   Heart Rate Source Monitor Monitor   Resp 19  --    BP (!) 179/57 139/63   BP Location Left upper arm Left upper arm   BP Method Automatic Automatic   MAP (Calculated) 97.67 88.33 no

## 2022-11-06 NOTE — PROGRESS NOTES
Daily    sodium chloride flush  10 mL IntraVENous 2 times per day    ipratropium-albuterol  1 ampule Inhalation Q4H WA    clopidogrel  75 mg Oral Daily    dofetilide  250 mcg Oral Q12H    isosorbide mononitrate  30 mg Oral Daily    lisinopril  20 mg Oral Daily    rivaroxaban  20 mg Oral Daily with breakfast    rosuvastatin  20 mg Oral Daily    mometasone-formoterol  2 puff Inhalation BID       Continuous Infusions:   sodium chloride         PRN Meds:  HYDROmorphone, potassium chloride **OR** potassium alternative oral replacement **OR** potassium chloride, oxymetazoline, diphenhydrAMINE-zinc acetate, hydrALAZINE, melatonin, sodium chloride flush, sodium chloride, magnesium hydroxide, acetaminophen **OR** acetaminophen, albuterol    Labs:  CBC:   Recent Labs     11/04/22  0452 11/05/22 0446   WBC 15.0* 15.7*   HGB 9.3* 9.1*   HCT 30.0* 28.9*   MCV 90.1 88.1    215       BMP:   Recent Labs     11/04/22 0452 11/05/22 0446 11/06/22 0446    139 142   K 4.5 3.8 4.3   CL 92* 92* 95*   CO2 37* 39* 37*   BUN 37* 43* 44*   CREATININE 1.3* 1.5* 1.4*       LIVER PROFILE:   No results for input(s): AST, ALT, LIPASE, BILIDIR, BILITOT, ALKPHOS in the last 72 hours. Invalid input(s): AMYLASE,  ALB    PT/INR:   Recent Labs     11/04/22  1542 11/05/22 0446   PROTIME 26.7* 17.0*   INR 2.45* 1.38*       APTT: No results for input(s): APTT in the last 72 hours. UA:  No results for input(s): NITRITE, COLORU, PHUR, LABCAST, WBCUA, RBCUA, MUCUS, TRICHOMONAS, YEAST, BACTERIA, CLARITYU, SPECGRAV, LEUKOCYTESUR, UROBILINOGEN, BILIRUBINUR, BLOODU, GLUCOSEU, AMORPHOUS in the last 72 hours. Invalid input(s): Jeanine Diver    No results for input(s): PH, PCO2, PO2 in the last 72 hours. Films:  Chest imaging and associated reports were personally reviewed and showed CXR 11/2:  Mild pulmonary vascular congestion. Stable mild cardiomegaly.      ABG:  No new study    Cultures:  Blood:  Urine:  Sputum:  Strep/Legionella Antigens: negative  MRSA probe:  Respiratory Viral Panel/Influenza: negative  C. Diff:   COVID19: negative    ECHO  11/3/22   Summary   Borderline conc. LVH with normal wall motion; EF   70%. Grade II diastolic   dysfunction with elevated LH filling pressures. The left atrium is severely dilated. The right ventricle is normal in size and function. Mild mitral, aortic and tricuspid regurgitation. PFTs  2015  Spirometry  Spirometry shows severe obstructive defect. Lung Volumes  Lung volumes show moderate restrictive defect. There is a decreased ERV consistent with obesity. Bronchodilator  There is no response to bronchodilator demonstrated. [Increase in FEV1 and/or FVC => 12% of control and => 200 ml]     Flow-Volume Loop  The flow-volume loop is compatible with an obstructive process. Diffusing Capacity  Diffusing capacity is moderately decreased. [40-60%]     Overall Interpretation  Severe obstruction with no response to bronchodilators     Moderate Restriction     Moderate Reduction in diffusing capacity    Assessment/Plan:     Acute on chronic hypoxemic respiratory failure with SPO2 <90% on room air with baseline O2 requirement of 3L  Maintain oxygen saturations >90% with supplemental oxygen and wean as tolerated  Continue diuresis, lasix 40mg IV TID  Duyifan and dulera    Severe COPD  Duonebs and dulera    Abnormal CXR, possible pulmonary edema  Diuresis    Insomnia likely d/t steroids  Decreased prednisone yesterday    Atrial fibrillation  On diltiazem  Cardiology following    DVT prophylaxis with xarelto    Thank you for allowing me to participate in the care of this patient. Will follow.     Raymundo Sher, RAULP  [facility]

## 2022-11-07 PROBLEM — I50.33 ACUTE ON CHRONIC DIASTOLIC HEART FAILURE (HCC): Status: ACTIVE | Noted: 2022-11-07

## 2022-11-07 PROBLEM — I47.10 PSVT (PAROXYSMAL SUPRAVENTRICULAR TACHYCARDIA): Status: ACTIVE | Noted: 2022-11-07

## 2022-11-07 PROBLEM — I47.1 PSVT (PAROXYSMAL SUPRAVENTRICULAR TACHYCARDIA) (HCC): Status: ACTIVE | Noted: 2022-11-07

## 2022-11-07 PROBLEM — I25.10 CORONARY ARTERY DISEASE INVOLVING NATIVE CORONARY ARTERY OF NATIVE HEART WITHOUT ANGINA PECTORIS: Status: ACTIVE | Noted: 2022-11-07

## 2022-11-07 LAB
ANION GAP SERPL CALCULATED.3IONS-SCNC: 11 MMOL/L (ref 3–16)
BASOPHILS ABSOLUTE: 0 K/UL (ref 0–0.2)
BASOPHILS RELATIVE PERCENT: 0.1 %
BUN BLDV-MCNC: 42 MG/DL (ref 7–20)
CALCIUM SERPL-MCNC: 8.9 MG/DL (ref 8.3–10.6)
CHLORIDE BLD-SCNC: 94 MMOL/L (ref 99–110)
CO2: 36 MMOL/L (ref 21–32)
CREAT SERPL-MCNC: 1.4 MG/DL (ref 0.6–1.2)
EKG ATRIAL RATE: 153 BPM
EKG DIAGNOSIS: NORMAL
EKG P AXIS: 61 DEGREES
EKG P-R INTERVAL: 236 MS
EKG Q-T INTERVAL: 348 MS
EKG QRS DURATION: 86 MS
EKG QTC CALCULATION (BAZETT): 453 MS
EKG R AXIS: -20 DEGREES
EKG T AXIS: 119 DEGREES
EKG VENTRICULAR RATE: 102 BPM
EOSINOPHILS ABSOLUTE: 0.2 K/UL (ref 0–0.6)
EOSINOPHILS RELATIVE PERCENT: 1.5 %
GFR SERPL CREATININE-BSD FRML MDRD: 39 ML/MIN/{1.73_M2}
GLUCOSE BLD-MCNC: 106 MG/DL (ref 70–99)
HCT VFR BLD CALC: 30 % (ref 36–48)
HEMOGLOBIN: 9.6 G/DL (ref 12–16)
LYMPHOCYTES ABSOLUTE: 1.1 K/UL (ref 1–5.1)
LYMPHOCYTES RELATIVE PERCENT: 8.3 %
MCH RBC QN AUTO: 28.3 PG (ref 26–34)
MCHC RBC AUTO-ENTMCNC: 32 G/DL (ref 31–36)
MCV RBC AUTO: 88.3 FL (ref 80–100)
MONOCYTES ABSOLUTE: 1 K/UL (ref 0–1.3)
MONOCYTES RELATIVE PERCENT: 7.1 %
NEUTROPHILS ABSOLUTE: 11.2 K/UL (ref 1.7–7.7)
NEUTROPHILS RELATIVE PERCENT: 83 %
PDW BLD-RTO: 16.7 % (ref 12.4–15.4)
PLATELET # BLD: 203 K/UL (ref 135–450)
PMV BLD AUTO: 9.7 FL (ref 5–10.5)
POTASSIUM SERPL-SCNC: 4.3 MMOL/L (ref 3.5–5.1)
RBC # BLD: 3.4 M/UL (ref 4–5.2)
SODIUM BLD-SCNC: 141 MMOL/L (ref 136–145)
WBC # BLD: 13.5 K/UL (ref 4–11)

## 2022-11-07 PROCEDURE — 6370000000 HC RX 637 (ALT 250 FOR IP): Performed by: INTERNAL MEDICINE

## 2022-11-07 PROCEDURE — 6370000000 HC RX 637 (ALT 250 FOR IP): Performed by: NURSE PRACTITIONER

## 2022-11-07 PROCEDURE — 94761 N-INVAS EAR/PLS OXIMETRY MLT: CPT

## 2022-11-07 PROCEDURE — 2060000000 HC ICU INTERMEDIATE R&B

## 2022-11-07 PROCEDURE — 97166 OT EVAL MOD COMPLEX 45 MIN: CPT

## 2022-11-07 PROCEDURE — 97530 THERAPEUTIC ACTIVITIES: CPT

## 2022-11-07 PROCEDURE — 94640 AIRWAY INHALATION TREATMENT: CPT

## 2022-11-07 PROCEDURE — 93010 ELECTROCARDIOGRAM REPORT: CPT | Performed by: INTERNAL MEDICINE

## 2022-11-07 PROCEDURE — 97162 PT EVAL MOD COMPLEX 30 MIN: CPT | Performed by: PHYSICAL THERAPIST

## 2022-11-07 PROCEDURE — 2580000003 HC RX 258: Performed by: NURSE PRACTITIONER

## 2022-11-07 PROCEDURE — 99233 SBSQ HOSP IP/OBS HIGH 50: CPT | Performed by: INTERNAL MEDICINE

## 2022-11-07 PROCEDURE — 97535 SELF CARE MNGMENT TRAINING: CPT

## 2022-11-07 PROCEDURE — 80048 BASIC METABOLIC PNL TOTAL CA: CPT

## 2022-11-07 PROCEDURE — 99232 SBSQ HOSP IP/OBS MODERATE 35: CPT | Performed by: INTERNAL MEDICINE

## 2022-11-07 PROCEDURE — 99223 1ST HOSP IP/OBS HIGH 75: CPT | Performed by: NURSE PRACTITIONER

## 2022-11-07 PROCEDURE — 6370000000 HC RX 637 (ALT 250 FOR IP): Performed by: HOSPITALIST

## 2022-11-07 PROCEDURE — 97530 THERAPEUTIC ACTIVITIES: CPT | Performed by: PHYSICAL THERAPIST

## 2022-11-07 PROCEDURE — 97116 GAIT TRAINING THERAPY: CPT | Performed by: PHYSICAL THERAPIST

## 2022-11-07 PROCEDURE — 36415 COLL VENOUS BLD VENIPUNCTURE: CPT

## 2022-11-07 PROCEDURE — 85025 COMPLETE CBC W/AUTO DIFF WBC: CPT

## 2022-11-07 PROCEDURE — 2700000000 HC OXYGEN THERAPY PER DAY

## 2022-11-07 PROCEDURE — 6360000002 HC RX W HCPCS: Performed by: NURSE PRACTITIONER

## 2022-11-07 RX ORDER — DIPHENHYDRAMINE HCL 25 MG
25 TABLET ORAL EVERY 8 HOURS PRN
Status: DISCONTINUED | OUTPATIENT
Start: 2022-11-07 | End: 2022-11-08 | Stop reason: HOSPADM

## 2022-11-07 RX ORDER — DILTIAZEM HYDROCHLORIDE 120 MG/1
120 CAPSULE, COATED, EXTENDED RELEASE ORAL 2 TIMES DAILY
Status: DISCONTINUED | OUTPATIENT
Start: 2022-11-07 | End: 2022-11-08

## 2022-11-07 RX ORDER — DOFETILIDE 0.12 MG/1
250 CAPSULE ORAL EVERY 12 HOURS SCHEDULED
Status: DISCONTINUED | OUTPATIENT
Start: 2022-11-07 | End: 2022-11-08 | Stop reason: HOSPADM

## 2022-11-07 RX ORDER — POTASSIUM CHLORIDE 20 MEQ/1
20 TABLET, EXTENDED RELEASE ORAL 2 TIMES DAILY WITH MEALS
Status: DISCONTINUED | OUTPATIENT
Start: 2022-11-07 | End: 2022-11-08 | Stop reason: HOSPADM

## 2022-11-07 RX ADMIN — OXYMETAZOLINE HCL 2 SPRAY: 0.05 SPRAY NASAL at 08:57

## 2022-11-07 RX ADMIN — IPRATROPIUM BROMIDE AND ALBUTEROL SULFATE 1 AMPULE: .5; 3 SOLUTION RESPIRATORY (INHALATION) at 08:56

## 2022-11-07 RX ADMIN — DIPHENHYDRAMINE HCL 25 MG: 25 TABLET ORAL at 21:23

## 2022-11-07 RX ADMIN — IPRATROPIUM BROMIDE AND ALBUTEROL SULFATE 1 AMPULE: .5; 3 SOLUTION RESPIRATORY (INHALATION) at 16:19

## 2022-11-07 RX ADMIN — DOFETILIDE 250 MCG: 0.12 CAPSULE ORAL at 21:57

## 2022-11-07 RX ADMIN — PREDNISONE 20 MG: 20 TABLET ORAL at 08:57

## 2022-11-07 RX ADMIN — DILTIAZEM HYDROCHLORIDE 120 MG: 120 CAPSULE, COATED, EXTENDED RELEASE ORAL at 21:57

## 2022-11-07 RX ADMIN — SODIUM CHLORIDE, PRESERVATIVE FREE 10 ML: 5 INJECTION INTRAVENOUS at 21:25

## 2022-11-07 RX ADMIN — FUROSEMIDE 40 MG: 10 INJECTION, SOLUTION INTRAMUSCULAR; INTRAVENOUS at 08:56

## 2022-11-07 RX ADMIN — DOFETILIDE 250 MCG: 0.12 CAPSULE ORAL at 08:56

## 2022-11-07 RX ADMIN — RIVAROXABAN 20 MG: 20 TABLET, FILM COATED ORAL at 08:57

## 2022-11-07 RX ADMIN — ISOSORBIDE MONONITRATE 30 MG: 30 TABLET, EXTENDED RELEASE ORAL at 12:19

## 2022-11-07 RX ADMIN — HYDRALAZINE HYDROCHLORIDE 50 MG: 50 TABLET, FILM COATED ORAL at 21:21

## 2022-11-07 RX ADMIN — ROSUVASTATIN CALCIUM 20 MG: 20 TABLET, FILM COATED ORAL at 08:56

## 2022-11-07 RX ADMIN — IPRATROPIUM BROMIDE AND ALBUTEROL SULFATE 1 AMPULE: .5; 3 SOLUTION RESPIRATORY (INHALATION) at 19:54

## 2022-11-07 RX ADMIN — Medication 9 MG: at 21:59

## 2022-11-07 RX ADMIN — HYDRALAZINE HYDROCHLORIDE 50 MG: 50 TABLET, FILM COATED ORAL at 14:52

## 2022-11-07 RX ADMIN — MOMETASONE FUROATE AND FORMOTEROL FUMARATE DIHYDRATE 2 PUFF: 200; 5 AEROSOL RESPIRATORY (INHALATION) at 09:06

## 2022-11-07 RX ADMIN — MOMETASONE FUROATE AND FORMOTEROL FUMARATE DIHYDRATE 2 PUFF: 200; 5 AEROSOL RESPIRATORY (INHALATION) at 19:55

## 2022-11-07 RX ADMIN — CLOPIDOGREL BISULFATE 75 MG: 75 TABLET ORAL at 08:56

## 2022-11-07 RX ADMIN — HYDRALAZINE HYDROCHLORIDE 50 MG: 50 TABLET, FILM COATED ORAL at 05:07

## 2022-11-07 RX ADMIN — DILTIAZEM HYDROCHLORIDE 180 MG: 180 CAPSULE, COATED, EXTENDED RELEASE ORAL at 08:56

## 2022-11-07 RX ADMIN — FUROSEMIDE 60 MG: 40 TABLET ORAL at 17:17

## 2022-11-07 RX ADMIN — IPRATROPIUM BROMIDE AND ALBUTEROL SULFATE 1 AMPULE: .5; 3 SOLUTION RESPIRATORY (INHALATION) at 11:52

## 2022-11-07 RX ADMIN — METOPROLOL SUCCINATE 50 MG: 50 TABLET, EXTENDED RELEASE ORAL at 12:20

## 2022-11-07 RX ADMIN — POTASSIUM CHLORIDE 20 MEQ: 1500 TABLET, EXTENDED RELEASE ORAL at 17:17

## 2022-11-07 RX ADMIN — SODIUM CHLORIDE, PRESERVATIVE FREE 10 ML: 5 INJECTION INTRAVENOUS at 08:57

## 2022-11-07 NOTE — PROGRESS NOTES
Pt asking about COVID booster and saline nasal spray. She said she forgot to talk with her physician about it yesterday. RN encouraged pt to follow up with her day shift physician and wrote a reminder for her on the white board.

## 2022-11-07 NOTE — PLAN OF CARE
Problem: Discharge Planning  Goal: Discharge to home or other facility with appropriate resources  Outcome: Progressing     Problem: Safety - Adult  Goal: Free from fall injury  Outcome: Progressing     Problem: Chronic Conditions and Co-morbidities  Goal: Patient's chronic conditions and co-morbidity symptoms are monitored and maintained or improved  Outcome: Progressing     Problem: Pain  Goal: Verbalizes/displays adequate comfort level or baseline comfort level  Outcome: Progressing     Problem: Respiratory - Adult  Goal: Achieves optimal ventilation and oxygenation  Outcome: Progressing     Problem: Cardiovascular - Adult  Goal: Maintains optimal cardiac output and hemodynamic stability  Outcome: Progressing  Goal: Absence of cardiac dysrhythmias or at baseline  Outcome: Progressing     Problem: Metabolic/Fluid and Electrolytes - Adult  Goal: Electrolytes maintained within normal limits  Outcome: Progressing  Goal: Hemodynamic stability and optimal renal function maintained  Outcome: Progressing     Problem: ABCDS Injury Assessment  Goal: Absence of physical injury  Outcome: Progressing     Problem: Skin/Tissue Integrity  Goal: Absence of new skin breakdown  Description: 1. Monitor for areas of redness and/or skin breakdown  2. Assess vascular access sites hourly  3. Every 4-6 hours minimum:  Change oxygen saturation probe site  4. Every 4-6 hours:  If on nasal continuous positive airway pressure, respiratory therapy assess nares and determine need for appliance change or resting period.   Outcome: Progressing     Problem: Skin/Tissue Integrity - Adult  Goal: Skin integrity remains intact  Outcome: Progressing  Flowsheets (Taken 11/7/2022 1801)  Skin Integrity Remains Intact: Monitor for areas of redness and/or skin breakdown     Problem: Musculoskeletal - Adult  Goal: Return mobility to safest level of function  Outcome: Progressing  Goal: Return ADL status to a safe level of function  Outcome: Progressing     Problem: Gastrointestinal - Adult  Goal: Minimal or absence of nausea and vomiting  Outcome: Progressing  Goal: Maintains adequate nutritional intake  Outcome: Progressing     Problem: Genitourinary - Adult  Goal: Absence of urinary retention  Outcome: Progressing  Goal: Urinary catheter remains patent  Outcome: Progressing

## 2022-11-07 NOTE — PROGRESS NOTES
Nutrition Education    Educated on CHF diet  Learners: Patient and   Readiness: Eager  Method: Explanation and handouts  Response: Verbalizes Understanding  Contact name and number provided  Heart Failure Diet Education:    Per hospital protocol or consult, patient being seen for Heart Failure diet guidelines. Patient's Lifestyle Questions:   Is HF a new Diagnosis? []Yes [x]No    Has patient had prior education? [x]Yes []No    Who does Grocery shopping and cooking?     Frequency of eating out? Not ofter    Typical Eating Habits:Limited intake. Bagel with butter for breakfast.  Tomato sandwich, pb and crackers for snack, pickles on crackers etc eaten throughout the day. Does use some canned vegetables      Instructed the Patient on:   [x] High/Low Sodium foods    [x] Moderation/portion control  [] Eating out  [x] Fluid Restriction    [x] Label reading  [] Carb Counting   [] Other:      Educational Materials Provided:   [x] Nutrition Care Manual (NCM) Heart Failure Nutrition Therapy   [] NCM Sodium (Salt) Content of Foods  [] NCM Sodium Free Flavoring Tips    [] Heart Healthy Eating Label Reading Tips   [] Healthy Eating when Eating Out  [] Controlling Your Fluids   [] Fast Food Guide (from [employer])  [] NCM Carbohydrate Counting for People with Diabetes   [] Managing Your Diabetes Plate Method     -Diet Recommendations: 2000 mg Sodium/day, 40 oz Fluids/day         Monitoring/Evaluation:   -Barriers:  makes pickles which is one of her favorite foods,  canned vegetables are preference also  -Evaluation of education: Indicates understanding.  -Expected compliance: excellent. Additional Comments: Both pt and  seem very commited to making changes.   Both were trying to follow diet, but some habits were hard for them to make the change( pickles and canned veggies    Total time involved in patient education: 15 minutes        Electronically signed by Rachel Hobbs ALEJANDRO OCONNOR on 11/7/2022 at 3:15 PM                  Leno Pérez RD, LD  Contact Number: [phone]

## 2022-11-07 NOTE — PROGRESS NOTES
Judy   Daily Progress Note      Admit Date:  11/2/2022   This is a 60-year-old female very well  known to me from before with a history of paroxysmal atrial  fibrillation, status post multiple ablation, CAD, recurrent CHF, COPD,  hypertension, hyperlipidemia came to the hospital with symptoms suddenly  increased shortness of breath. She is on chronic 4 L of oxygen, but her  oxygen level dropped down to 60% with this increased shortness of  breath. She also at that time had a hard blood pressure going into 300  range per patient. Since her oxygen level did not improve, she was  brought to the hospital.  She is being admitted for acute on chronic  hypoxic respiratory failure. She denies any chest tightness or  pressure. One night she had episodes of rapid atrial fibrillation. She  had no swelling in her lower extremities. CC: \"    Interval history 11/7/2022  -Patient had episodes of rapid atrial fibrillation over the weekend and was symptomatic from it. She had a right heart catheterization on 11/4/2022 which showed that she was in heart failure  Today she feels much better. .  She is now down to 2 to 1/2 L of oxygen. (Her home requirement was 3 to 4 L).   She has remained in sinus rhythm for last 24 hours  Objective:   BP (!) 165/68   Pulse 51   Temp 97.9 °F (36.6 °C) (Axillary)   Resp 22   Ht 5' 4\" (1.626 m)   Wt 227 lb 8.2 oz (103.2 kg)   SpO2 98%   BMI 39.05 kg/m²     Intake/Output Summary (Last 24 hours) at 11/7/2022 0845  Last data filed at 11/7/2022 0509  Gross per 24 hour   Intake 140 ml   Output 3110 ml   Net -2970 ml     Wt Readings from Last 3 Encounters:   11/07/22 227 lb 8.2 oz (103.2 kg)   10/13/22 228 lb (103.4 kg)   09/29/22 219 lb (99.3 kg)     Telemetry:nsr    Physical Exam:  General:  NAD, Awake, alert and oriented X4  Skin:  Warm and dry  Neck:  Supple, no JVP appreciated, no bruit  Chest: Diminished breath sounds with no significant crackles identified  Cardiovascular:  Regular rate. S1S2  Abdomen:  Soft, nontender, +bowel sounds  Extremities:  No LE edema    Cardiac Diagnosis:  hypertension, hyperlipidemia, CHF, and atrial fibrillation    Medications:    dofetilide  250 mcg Oral 2 times per day    influenza virus vaccine  0.5 mL IntraMUSCular Prior to discharge    furosemide  40 mg IntraVENous TID    dilTIAZem  180 mg Oral Daily    predniSONE  20 mg Oral Daily    hydrALAZINE  50 mg Oral 3 times per day    metoprolol succinate  50 mg Oral Daily    sodium chloride flush  10 mL IntraVENous 2 times per day    ipratropium-albuterol  1 ampule Inhalation Q4H WA    clopidogrel  75 mg Oral Daily    isosorbide mononitrate  30 mg Oral Daily    [Held by provider] lisinopril  20 mg Oral Daily    rivaroxaban  20 mg Oral Daily with breakfast    rosuvastatin  20 mg Oral Daily    mometasone-formoterol  2 puff Inhalation BID      sodium chloride       HYDROmorphone, potassium chloride **OR** potassium alternative oral replacement **OR** potassium chloride, oxymetazoline, diphenhydrAMINE-zinc acetate, hydrALAZINE, melatonin, sodium chloride flush, sodium chloride, magnesium hydroxide, acetaminophen **OR** acetaminophen, albuterol    Lab Data:  CBC:   Recent Labs     11/05/22  0446 11/07/22  0421   WBC 15.7* 13.5*   HGB 9.1* 9.6*    203     BMP:    Recent Labs     11/05/22  0446 11/06/22 0446 11/07/22  0421    142 141   K 3.8 4.3 4.3   CO2 39* 37* 36*   BUN 43* 44* 42*   CREATININE 1.5* 1.4* 1.4*     LIVR: No results for input(s): AST, ALT in the last 72 hours. INR:    Recent Labs     11/04/22  1542 11/05/22 0446   INR 2.45* 1.38*     APTT: No results for input(s): APTT in the last 72 hours. BNP:  No results for input(s): BNP in the last 72 hours. Imaging:[facility] 9/20/2022  CORONARY ANGIOGRAPHY:  1. Left main trunk: It arises from the left sinus of Valsalva. It  divides into LAD and circumflex artery.   Left main trunk has mild  disease, but it is not hemodynamically significant. 2.  Left anterior descending artery: It arises from left main trunk. It is a tortuous artery and there appears to be a mild plaque of the  ostium. Remainder of the LAD appears free of atherosclerosis. 3.  Left circumflex artery:  The left circumflex artery is a large  artery and it has a 75% stenosis in the proximal segment. There is mild  disease in the obtuse marginal branch distally. There are collaterals  being supplied from the coronary system to the right coronary artery. 4.  RCA:  It arises from the right sinus of Valsalva. It is diffusely  diseased in the proximal and mid segment and it is completely blocked  after that. 5.  Left ventriculogram reveals a preserved left ventricular systolic  function with estimated EF of 55% to 60%. OVERALL IMPRESSION:  1. Mildly elevated right heart pressures. 2.  Normal cardiac output and indices. 3.  No oxygen step-up to suggest ASD or PFO. 4.  Patent left main trunk with mild disease. 5.  Tortuous LAD with mild osteal disease, not hemodynamically  significant. 6.  75% stenosis of the proximal circumflex artery with some mild  disease of the obtuse marginal branch, not hemodynamically significant. 7.  Left-to-right collaterals. 8.  100% occlusion of mid RCA. 9.  Normal left ventricular systolic function with preserved EF of 65%  to 70%. [facility]/PCI w Dr. Kamilah Lagos 9/20/2022  Interventions:  PCI performed to the proximal Lcx with DESx2  Resolute Thayer 3.5x22 mm  Resolute Mckay 3. 5x8 mm  Excellent post angiographic result with BALWINDER 3 flow     [facility] 11/4/2022  RA 5  RV 53/9  PA 49/20 (28)  PCWP25  CI 3.8    Assessment:Plan  1 acute on chronic diastolic heart failure. NYHA class III on admission. Stage C  -Diuresing well on Lasix 40 mg IV 3 times daily.   Will switch her to oral diuretics since she appears well compensated today  -Weight is down to 227(2 pounds since yesterday)  -Currently on isosorbide, diltiazem, Toprol and hydralazine.  -Her lisinopril is currently on hold but will discontinue  -Since she has recurrent admissions for heart failure, will switch her to Dorla Pickerel in outpatient setting. Her uncontrolled blood pressure, and recurrent atrial fibrillation is also playing a role in her CHF recurrences  -We will consider SGLT2 inhibitor therapy in outpatient setting. .      Recurrent paroxysmal atrial fibrillation  -Had few episodes of rapid atrial fibrillation over the weekend. None for last 24 hours after she was started on Cardizem  -Currently has sinus bradycardia on Cardizem, Toprol and Tikosyn  -Hopefully episodes will diminish once her heart failure improves further  -On chronic Xarelto therapy.  -Have discussed with EP  Hypertension  -Blood pressure still remains elevated. -Patient on numerous antihypertensive medicines  -We will space her BP medications.     If she stays stable, will consider discharging her on 11/8/2022  -      Electronically signed by Jessica Warner MD on 11/7/2022 at 8:45 AM

## 2022-11-07 NOTE — PROGRESS NOTES
Pulmonary Critical Care Progress Note     Patient's name:  Arnie Putnam  Medical Record Number: [id number]  Patient's account/billing number: [de-identified]  Patient's YOB: 1945  Age: 69 y.o. Date of Admission: 11/2/2022  7:21 PM  Date of Consult: 11/7/2022      Primary Care Physician: Hannah Houser MD      Code Status: Full Code    Chief complaint: respiratory failure     Assessment and Plan     Acute on chronic hypoxemic respiratory failure   Severe COPD  Acute on chronic CHF  Atrial fibrillation    Plan:  Steroid x 5 days total  Bronchodilators  O2 @ baseline  Advance pt/to       Overnight:  Did not sleep  No fever  Sob improving    REVIEW OF SYSTEMS:  Review of Systems -   General ROS: negative  Psychological ROS: negative  Ophthalmic ROS: negative  ENT ROS: negative  Allergy and Immunology ROS: negative  Hematological and Lymphatic ROS: negative  Endocrine ROS: negative  Breast ROS: negative  Respiratory ROS: cough, wheezing, and sob  Cardiovascular ROS: sob with exertion   Gastrointestinal ROS:negative  Genito-Urinary ROS: negative  Musculoskeletal ROS: negative  Neurological ROS: negative  Dermatological ROS: negative        Physical Exam:    Vitals: BP (!) 170/54   Pulse 56   Temp 97.6 °F (36.4 °C) (Oral)   Resp 23   Ht 5' 4\" (1.626 m)   Wt 227 lb 8.2 oz (103.2 kg)   SpO2 98%   BMI 39.05 kg/m²     Last Body weight:   Wt Readings from Last 3 Encounters:   11/07/22 227 lb 8.2 oz (103.2 kg)   10/13/22 228 lb (103.4 kg)   09/29/22 219 lb (99.3 kg)       Body Mass Index : Body mass index is 39.05 kg/m². Intake and Output summary:   Intake/Output Summary (Last 24 hours) at 11/7/2022 1314  Last data filed at 11/7/2022 1310  Gross per 24 hour   Intake 320 ml   Output 3110 ml   Net -2790 ml       Physical Examination:     Gen:  No acute distress. Eyes: PERRL. Anicteric sclera. No conjunctival injection. ENT: No discharge. Posterior oropharynx clear.  External appearance of ears and nose normal.  Neck: Trachea midline. No mass   Resp:  severely diminished bilaterally with prolonged expiratory phase. CV: Regular rate. Regular rhythm. No murmur or rub. No edema. GI: Soft, Non-tender. Non-distended. +BS  Skin: Warm, dry, w/o erythema. Lymph: No cervical or supraclavicular LAD. M/S: No cyanosis. No clubbing. Neuro:  CN 2-12 tested, no focal neurologic deficit. Moves all extremities  Psych: Awake and alert, Oriented x 3. Judgement and insight appropriate. Mood stable. Laboratory findings:-    CBC:   Recent Labs     11/07/22 0421   WBC 13.5*   HGB 9.6*        BMP:    Recent Labs     11/05/22 0446 11/05/22 0446 11/06/22 0446 11/07/22 0421     --  142 141   K 3.8   < > 4.3 4.3   CL 92*  --  95* 94*   CO2 39*  --  37* 36*   BUN 43*  --  44* 42*   CREATININE 1.5*  --  1.4* 1.4*   GLUCOSE 109*  --  109* 106*    < > = values in this interval not displayed. S. Calcium:  Recent Labs     11/07/22 0421   CALCIUM 8.9       S. Magnesium:  Recent Labs     11/06/22  0944   MG 1.90           Radiology Review:  Pertinent images / reports were reviewed as a part of this visit.                      Riky Cota MD, M.D.            11/7/2022, 1:14 PM

## 2022-11-07 NOTE — PROGRESS NOTES
surgery. Assessment   Body Structures, Functions, Activity Limitations Requiring Skilled Therapeutic Intervention: Decreased functional mobility   Assessment: pt is a 70 yo female who was adm to \[facility]\"" with SOB and COPD exacerbation; at baseline pt lives with her  and is Ind without an AD but  assists as needed; pt likely fairly sedentary at home. Anticpate pt will be safe to return home with assist from her  and home PT; recommended pt use her walker at home for energy conservation  Therapy Prognosis: Good  Decision Making: Medium Complexity  Barriers to Learning: slightly self limiting; needs encouragement  Requires PT Follow-Up: Yes  Activity Tolerance  Activity Tolerance: Patient tolerated treatment well;Patient limited by endurance     Plan   Physcial Therapy Plan  General Plan: 3-5 times per week  Current Treatment Recommendations: Functional mobility training, Transfer training, Balance training, Gait training, Strengthening  Safety Devices  Type of Devices: Call light within reach, Chair alarm in place, Left in chair, Nurse notified, All fall risk precautions in place     Restrictions  Restrictions/Precautions  Restrictions/Precautions: Fall Risk  Position Activity Restriction  Other position/activity restrictions: O2 via NC     Subjective   General  Chart Reviewed: Yes  Patient assessed for rehabilitation services?: Yes  Additional Pertinent Hx: per H&P note: \"73 y.o. female with PMHx of A-fib, CAD, CHF, Severe COPD, HLD and HTN presented to [facility] with shortness of breath. Pt states that she always has some shortness of breath but she is usually able to get up to commode without becoming severely short of breath and hypoxic on her home 4 liters.  reports sometimes her oxygen reading will dip down into the 70's but once she sits back down it goes back up to 90's. Today is stayed in the 60's. She has chronic cough with clear phlegm, no reported fever or chills.   No chest pain or dizziness. She was at her baseline yesterday. She reports taking all home meds as directed.  \"  Response To Previous Treatment: Not applicable  Family / Caregiver Present: Yes ( in room)  Referring Practitioner: Dr Therese Schilder  Referral Date : 11/06/22  Follows Commands: Within Functional Limits  Subjective  Subjective: pt anxious but pleasant and agreeable to working with therapy         Social/Functional History  Social/Functional History  Lives With: Spouse  Type of Home: Mobile home  Home Layout: One level  Home Access: Stairs to enter with rails  Entrance Stairs - Number of Steps: 1 DEIDRE  Bathroom Shower/Tub:  (walk-in tub w/ small step)  Bathroom Toilet: Standard  Bathroom Equipment: Built-in shower seat, Grab bars in shower  Home Equipment: Alton Ricardo, 4 wheeled, Oxygen, Walker, rolling (4L O2 all the time)  ADL Assistance: Independent (spouse can assist on days SOB)  Homemaking Assistance:  (spouse assists with IADLs)  Ambulation Assistance: Independent (with 9DC)  Transfer Assistance: Independent  Occupation: Retired  Vision/Hearing  Vision  Vision: Within Functional Limits  Hearing  Hearing: Within functional limits    Cognition   Orientation  Overall Orientation Status: Within [address]: anxious at times     Objective   Heart Rate: [address]: Monitor  BP: (!) 165/68  BP Location: Left upper arm  BP Method: Automatic  MAP (Calculated): 100.33  Resp: 22  SpO2: 98 %  O2 Device: Venturi-mask              AROM RLE (degrees)  RLE AROM: WFL  AROM LLE (degrees)  LLE AROM : WFL  Strength RLE  Strength RLE: WFL  Strength LLE  Strength LLE: WFL           Bed mobility  Supine to Sit: Minimal assistance (with HOB elevated; pulled up on therapist hand)  Transfers  Sit to Stand: Contact guard assistance  Stand to Sit: Contact guard assistance  Ambulation  Surface: Level tile  Device: No Device;Hand-Held Assist  Assistance: Contact guard assistance;Minimal assistance  Quality of Gait: unsteady with short choppy steps; increased latera sway due to body habitus  Gait Deviations: Slow Sandie  Distance: 10'  Comments: therapist managed O2 lines  More Ambulation?: Yes  Ambulation 2  Surface - 2: level tile  Device 2: Rolling Walker  Assistance 2: Contact guard assistance  Quality of Gait 2: more steady with walker but very anxious and impulsive at times; beatlhing heavily but O2 sats 97% on O2 via NC  Gait Deviations: Slow Sandie  Distance: 22'  Comments: assisted with positioning in chair for comfort with all needs in reach; set up for breakfast tray     Balance  Comments: SBA for sitting EOB       Pt had purewick in upon entering however adult brief saturated; pt used commode; assisted with changing adult brief    OutComes Score                                                  AM-PAC Score  AM-PAC Inpatient Mobility Raw Score : 19 (11/07/22 0851)  AM-PAC Inpatient T-Scale Score : 45.44 (11/07/22 0851)  Mobility Inpatient CMS 0-100% Score: 41.77 (11/07/22 0851)  Mobility Inpatient CMS G-Code Modifier : CK (11/07/22 0851)          Tinneti Score       Goals  Short Term Goals  Time Frame for Short Term Goals: by discharge  Short Term Goal 1: no bed mob goal as pt sleeps in a recliner  Short Term Goal 2: transfers SBA  Short Term Goal 3: amb 48' with or without AD SBA while maintaining her O2 sats in 90s  Patient Goals   Patient Goals : to return home       Education  Patient Education  Education Given To: Patient  Education Provided: Role of Therapy  Education Provided Comments: reviewed call light and not getting up without assist  Education Method: Verbal  Education Outcome: Verbalized understanding      Therapy Time   Individual Concurrent Group Co-treatment   Time In 0815         Time Out 0853         Minutes 38                 SHARYN HILLMAN PT   Electronically signed by SHARYN HILLMAN PT on 11/7/2022 at 8:53 AM

## 2022-11-07 NOTE — PROGRESS NOTES
Occupational Therapy  Facility/Department: [address] [facility]  Occupational Therapy Initial Assessment    Name: Leno Palmer  : 1945  MRN: [id number]  Date of Service: 2022    Discharge Recommendations:  24 hour supervision or assist, Home with Home health OT  OT Equipment Recommendations  Equipment Needed: No     Leno Palmer scored a 18/24 on the AM-PAC ADL Inpatient form. Current research shows that an AM-PAC score of 18 or greater is typically associated with a discharge to the patient's home setting. Based on the patient's AM-PAC score, and their current ADL deficits, it is recommended that the patient have 2-3 sessions per week of Occupational Therapy at d/c to increase the patient's independence. At this time, this patient demonstrates the endurance and safety to discharge home with home therapy and a follow up treatment frequency of 2-3x/wk. Please see assessment section for further patient specific details. If patient discharges prior to next session this note will serve as a discharge summary. Please see below for the latest assessment towards goals. Patient Diagnosis(es): The primary encounter diagnosis was LI (dyspnea on exertion). A diagnosis of Elevated troponin was also pertinent to this visit. Past Medical History:  has a past medical history of AAA (abdominal aortic aneurysm), AAA (abdominal aortic aneurysm) without rupture, Atrial fibrillation (HCC), CAD (coronary artery disease), CHF (congestive heart failure) ([facility] [address] COPD (chronic obstructive pulmonary disease) ([facility] [address] History of blood clots, Hyperlipidemia, and Hypertension. Past Surgical History:  has a past surgical history that includes Colonoscopy (07); Hysterectomy (); Appendectomy (); bladder suspension; Cataract removal; Tonsillectomy (as a child); tumor excision; Cholecystectomy (10/15/13); Colonoscopy (2017); joint replacement (2013);  Abdominal aortic aneurysm repair; and Cardiac surgery. Assessment   Performance deficits / Impairments: Decreased functional mobility ; Decreased balance;Decreased ADL status; Decreased safe awareness;Decreased endurance;Decreased high-level IADLs;Decreased strength  Assessment: 68 y/o female admitted 11/2/2022 with respiratory failure 2/2 COPD exacerbation. PTA pt lives at home with spouse and was independent with ADLs and functional mobility. Pt reports she wears 4L O2 at home. Today, pt required CGA for transfers and functional mobility around room with RW. Pt reports SOB with activity despite oxygen above 90% on 2.5L. Pt with functional UE ROM for self care and anticipate will require up to min A for ADLs. Pt will benefit from skilled therapy while in hospital with anticipation of returning home with family support at discharge. Recommend home OT.   Prognosis: Good  Decision Making: Medium Complexity  REQUIRES OT FOLLOW-UP: Yes  Activity Tolerance  Activity Tolerance: Patient limited by fatigue  Activity Tolerance Comments: SOB with activity despite oxygen above 90% on 2.5L with activity        Plan   Occupational Therapy Plan  Times Per Week: 3-5  Current Treatment Recommendations: Strengthening, Balance training, Functional mobility training, Wheelchair mobility training, Endurance training, Self-Care / ADL, Patient/Caregiver education & training     Restrictions  Restrictions/Precautions  Restrictions/Precautions: Fall Risk  Position Activity Restriction  Other position/activity restrictions: 2.5L O2 via NC (4L baseline)    Subjective   General  Chart Reviewed: Yes  Patient assessed for rehabilitation services?: Yes  Additional Pertinent Hx: 68 y/o female admitted 11/2/2022 with Acute respiratory failure with hypoxia due to severe copd and heart failure  Family / Caregiver Present: No  Referring Practitioner: Dr. Johnnie Resendiz  Diagnosis: COPD exacerbation     Social/Functional History  Social/Functional History  Lives With: Spouse  Type of Home: Mobile at times  Orientation  Overall Orientation Status: Within Functional Limits                  Education Given To: Patient; Family  Education Provided: Transfer Training;Plan of Care;Role of Therapy  Education Method: Demonstration;Verbal  Barriers to Learning: None  Education Outcome: Verbalized understanding;Demonstrated understanding    LUE AROM (degrees)  LUE AROM : WFL  Left Hand AROM (degrees)  Left Hand AROM: WFL  RUE AROM (degrees)  RUE AROM : WFL  Right Hand AROM (degrees)  Right Hand AROM: [facility]     AM-PAC Score  AM-PAC Inpatient Daily Activity Raw Score: 18 (11/07/22 0905)  AM-PAC Inpatient ADL T-Scale Score : 38.66 (11/07/22 0905)  ADL Inpatient CMS 0-100% Score: 46.65 (11/07/22 0905)  ADL Inpatient CMS G-Code Modifier : CK (11/07/22 0905)    Goals  Short Term Goals  Time Frame for Short Term Goals: Prior to DC: Short Term Goal 1: Pt will complete ADL transfers/mobility with supervision  Short Term Goal 2: Pt will complete toileting with supervision  Short Term Goal 3: Pt will complete LB Dressing with supervision  Short Term Goal 4: Pt will tolerate standing > 2 min for functional task with supervision  Patient Goals   Patient goals : to return home       Therapy Time   Individual Concurrent Group Co-treatment   Time In 0815         Time Out 0853         Minutes 38         Timed Code Treatment Minutes: 20 Minutes     This note to serve as OT d/c summary if pt is d/c-ed prior to next therapy session.     Maria Del Carmen Patel, OTR/L

## 2022-11-07 NOTE — PROGRESS NOTES
Hospitalist Progress Note  11/7/2022 12:00 PM    PCP: Francisca Coleman MD    [id number]     Date of Admission: 11/2/2022                                                                                                             HOSPITAL COURSE    Patient demographics:  The patient  Yamilex Vazquez is a 69 y.o. female     Significant past medical history:   Patient Active Problem List   Diagnosis    Primary hypertension    Hyperlipidemia    Coronary atherosclerosis due to calcified coronary lesion of native artery    DENISE (obstructive sleep apnea)    History of DVT (deep vein thrombosis)    Family history of DVT    AAA (abdominal aortic aneurysm) without rupture    Pleural effusion, left    Pleural effusion    COPD exacerbation (HCC)    Pelvic pain in female    Vitamin D deficiency    Other emphysema (HCC)    Acute bronchitis    Acute respiratory failure with hypoxia (HCC)    Abnormal CXR    Atypical pneumonia    Atrial fibrillation with RVR (HCC)    Chronic diastolic heart failure (HCC)    PVD (peripheral vascular disease) ([facility])    Abnormal nuclear stress test    AAA (abdominal aortic aneurysm)    PAF (paroxysmal atrial fibrillation) ([facility])    Endoleak post (EVAR) endovascular aneurysm repair, sequela    Carotid artery stenosis without cerebral infarction, bilateral    History of repair of aneurysm of abdominal aorta using endovascular stent graft    Atherosclerotic heart disease of native coronary artery with other forms of angina pectoris (HCC)    Morbidly obese ([address]    Stage 3 severe COPD by GOLD classification ([address]    Hypertensive crisis    Acute on chronic diastolic congestive heart failure (HCC)    Respiratory failure (HCC)    Left atrial flutter by electrocardiogram ([address]    Persistent atrial fibrillation (HCC)    A-fib (HCC)    Obesity (BMI 30-39. 9)    Atrial fibrillation (HCC)    LI (dyspnea on exertion)    Chronic systolic (congestive) heart failure    Pneumonia    Acute on chronic respiratory failure with Carotid artery stenosis without cerebral infarction, bilateral    History of repair of aneurysm of abdominal aorta using endovascular stent graft    Atherosclerotic heart disease of native coronary artery with other forms of angina pectoris ([facility] [address]    Morbidly obese (HCC)    Stage 3 severe COPD by GOLD classification [facility]    Hypertensive crisis    Acute on chronic diastolic congestive heart failure (HCC)    Respiratory failure ([facility])    Left atrial flutter by electrocardiogram ([facility])    Persistent atrial fibrillation (HCC)    A-fib ([facility])    Obesity (BMI 30-39. 9)    Atrial fibrillation ([facility])    LI (dyspnea on exertion)    Chronic systolic (congestive) heart failure    Pneumonia    Acute on chronic respiratory failure with hypoxia ([facility])    Drug-induced insomnia ([facility])       Allergies  Morphine    Medications    Scheduled Meds:   dofetilide  250 mcg Oral 2 times per day    furosemide  60 mg Oral BID    influenza virus vaccine  0.5 mL IntraMUSCular Prior to discharge    dilTIAZem  180 mg Oral Daily    predniSONE  20 mg Oral Daily    hydrALAZINE  50 mg Oral 3 times per day    metoprolol succinate  50 mg Oral Daily    sodium chloride flush  10 mL IntraVENous 2 times per day    ipratropium-albuterol  1 ampule Inhalation Q4H WA    clopidogrel  75 mg Oral Daily    isosorbide mononitrate  30 mg Oral Daily    rivaroxaban  20 mg Oral Daily with breakfast    rosuvastatin  20 mg Oral Daily    mometasone-formoterol  2 puff Inhalation BID     Continuous Infusions:   sodium chloride       PRN Meds:  HYDROmorphone, potassium chloride **OR** potassium alternative oral replacement **OR** potassium chloride, oxymetazoline, diphenhydrAMINE-zinc acetate, hydrALAZINE, melatonin, sodium chloride flush, sodium chloride, magnesium hydroxide, acetaminophen **OR** acetaminophen, albuterol    Vitals   Vitals /wt Patient Vitals for the past 8 hrs:   BP Temp Temp src Pulse Resp SpO2   11/07/22 1152 -- -- -- 56 23 98 %   11/07/22 1137 (!) 170/54 97.6 °F (36.4 °C) Oral 58 21 100 %   11/07/22 0906 -- -- -- 55 20 --   11/07/22 0856 -- -- -- 66 14 96 %   11/07/22 0730 (!) 165/68 97.9 °F (36.6 °C) Axillary -- -- 98 %   11/07/22 0507 (!) 150/56 -- -- -- -- --   11/07/22 0506 (!) 150/56 97.9 °F (36.6 °C) Oral 51 22 99 %          72HR INTAKE/OUTPUT:    Intake/Output Summary (Last 24 hours) at 11/7/2022 1200  Last data filed at 11/7/2022 0509  Gross per 24 hour   Intake 140 ml   Output 3110 ml   Net -2970 ml         Exam:    Gen:   Alert and oriented ×3    Eyes: PERRL. No sclera icterus. No conjunctival injection. ENT: No discharge. Pharynx clear. External appearance of ears and nose normal.  Neck: Trachea midline. No obvious mass. Resp: No accessory muscle use. No crackles. No wheezes. No rhonchi. CV: Regular rate. Regular rhythm. No murmur or rub. No edema. GI: Non-tender. Non-distended. No hernia. Skin: Warm, dry, normal texture and turgor. Lymph: No cervical LAD. No supraclavicular LAD. M/S: / Ext. No cyanosis. No clubbing. No joint deformity. Neuro: CN 2-12 are intact,  no neurologic deficits noted. PT/INR:   Recent Labs     11/04/22  1542 11/05/22 0446   PROTIME 26.7* 17.0*   INR 2.45* 1.38*       APTT: No results for input(s): APTT in the last 72 hours. CBC:   Recent Labs     11/05/22 0446 11/07/22 0421   WBC 15.7* 13.5*   HGB 9.1* 9.6*   HCT 28.9* 30.0*   MCV 88.1 88.3    203         BMP:   Recent Labs     11/05/22 0446 11/06/22 0446 11/07/22 0421    142 141   K 3.8 4.3 4.3   CL 92* 95* 94*   CO2 39* 37* 36*   BUN 43* 44* 42*   CREATININE 1.5* 1.4* 1.4*         LIVER PROFILE:   No results for input(s): ALKPHOS, AST, ALT, ALB, BILIDIR, BILITOT, ALKPHOS in the last 72 hours. No results for input(s): AMYLASE in the last 72 hours. No results for input(s): LIPASE in the last 72 hours. UA:  No results for input(s): NITRITE, LABCAST, WBCUA, RBCUA, MUCUS in the last 72 hours.       TROPONIN:   Recent Labs     11/06/22  0944 11/06/22  1503   TROPONINI 0.09* 0.11*         Lab Results   Component Value Date/Time    URRFLXCULT Not Indicated 11/02/2022 07:29 PM       No results for input(s): TSHREFLEX in the last 72 hours. No components found for: [id number]  POC GLUCOSE:  No results for input(s): POCGLU in the last 72 hours. No results for input(s): LABA1C in the last 72 hours. Lab Results   Component Value Date/Time    LABA1C 5.6 05/23/2019 02:38 PM         ASSESSMENT AND PLAN    Acute on chronic respiratory failure. - with increased work of breath, tachypnea and hypoxia  - home 4 liters saturation 67%  -Titrate oxygen for saturations greater than or equal to 90%    COPD Exacerbation  Steroids and bronchodilators and antibiotics  bronchial higiene  Consult to pulmonary     Acute on chronic diastolic CHF  Ejection fraction 70% and grade 2 diastolic dysfunction  - cont lasix, BB, ACEi  - daily wts and I/Os  - Cardiology consulted; Right heart cath done 11/4 positive for pulmonary hypertension  - Switch to po lasix     CAD with PCI and JORJE 9/20/2022  - Successful PCI of proximal Lcx with DESx2  - continue bb, acei, imdur, hydralazine and nitro prn  - continue asa, plavix and statin     Elevated troponin secondary to type II NSTEMI  Atrial Fibrillation   - currently with RVR  - continue Xarelto, metoprolol and dofetilide   - cardiology consulted this am, cardizem po added      Hypertensive urgency at the time of admission  -Blood pressure control has improved  Continue lisinopril and Toprol-XL    Debility  PT OT    Code Status: Full Code    Dispo - dc home in am with [facility].        The patient and / or the family were informed of the results of any tests, a time was given to answer questions, a plan was proposed and they agreed with plan.     Omar Lees MD

## 2022-11-07 NOTE — CONSULTS
Cardiac Electrophysiology Consultation     Date: 11/7/2022  Admit Date:  11/2/2022  Admission Diagnosis: LI (dyspnea on exertion) [R06.09]  Elevated troponin [R77.8]  Acute on chronic respiratory failure with hypoxia ([facility] [address] [J96.21]     Reason for Consultation: atrial runs, PSVT  Consult Requesting Physician: Donavon Mooney NP      History of Present Illness  Jasper Michaud is a 69y.o. year old female with past medical history significant for persistent atrial fibrillation s/p ablations/cardioversions now on dofetilide, atrial flutter s/p ablations/cardioversions, AAA, CAD, HTN, COPD, PE, DVT and diastolic HF who presented to the hospital initially for SOB. She has been treated for heart failure exacerbation and diuresed with improvement in her dyspnea. She has had frequent PACs, atrial runs and brief PSVT noted on the monitor. She does admit to palpitations at times. No prolonged atrial fibrillation seen. Her dofetilide has been continued, Toprol was increased and diltiazem was added yesterday. Over the last 24 hours, her PACs and brief SVT have resolved. Past Medical History:   Diagnosis Date    AAA (abdominal aortic aneurysm)     pt states it is 4cm    AAA (abdominal aortic aneurysm) without rupture 2/10/2015    Atrial fibrillation (HCC)     CAD (coronary artery disease)     CHF (congestive heart failure) (HCC)     COPD (chronic obstructive pulmonary disease) ([facility] [address]     History of blood clots     Hyperlipidemia     Hypertension         Past Surgical History:   Procedure Laterality Date    ABDOMINAL AORTIC ANEURYSM REPAIR      Endovascular abdominal AA    APPENDECTOMY  1990    incidental    BLADDER SUSPENSION      CARDIAC SURGERY      CATARACT REMOVAL      CHOLECYSTECTOMY  10/15/13    COLONOSCOPY  9/2/07    dr Lopez Mock and check in 5 years. COLONOSCOPY  06/16/2017    ok dr arndt, repeat 5 years    HYSTERECTOMY ([facility])  1990    for benign tumor.  just the uterus    JOINT REPLACEMENT  12/2013 right knee replacement    TONSILLECTOMY  as a child    TUMOR EXCISION      benign behind right ear about 2008       Current Outpatient Medications   Medication Instructions    albuterol (PROVENTIL) 2.5 mg, Nebulization, EVERY 4 HOURS PRN    albuterol sulfate  (90 Base) MCG/ACT inhaler INHALE TWO PUFFS BY MOUTH EVERY 4 HOURS AS NEEDED FOR WHEEZING OR FOR SHORTNESS OF BREATH    clobetasol (TEMOVATE) 0.05 % cream Topical, 2 TIMES DAILY PRN    clopidogrel (PLAVIX) 75 mg, Oral, DAILY    dofetilide (TIKOSYN) 250 mcg, Oral, EVERY 12 HOURS    furosemide (LASIX) 40 mg, Oral, 2 TIMES DAILY    hydrALAZINE (APRESOLINE) 12.5 mg, Oral, 3 TIMES DAILY    isosorbide mononitrate (IMDUR) 30 mg, Oral, DAILY    lisinopril (PRINIVIL;ZESTRIL) 20 MG tablet TAKE ONE TABLET BY MOUTH DAILY    metoprolol succinate (TOPROL XL) 25 MG extended release tablet TAKE ONE TABLET BY MOUTH DAILY    Multiple Vitamins-Minerals (MULTI FOR HER 50+ PO) 1 tablet, Oral, DAILY    nitroGLYCERIN (NITROSTAT) 0.4 mg, SubLINGual, EVERY 5 MIN PRN    rivaroxaban (XARELTO) 20 MG TABS tablet TAKE ONE TABLET BY MOUTH DAILY WITH BREAKFAST    rosuvastatin (CRESTOR) 20 mg, Oral, DAILY    SYMBICORT 160-4.5 MCG/ACT AERO 2 puffs, Inhalation, 2 TIMES DAILY    vitamin C (ASCORBIC ACID) 500 mg, Oral, DAILY        Allergies   Allergen Reactions    Morphine Rash     rash       Social History:   reports that she quit smoking about 9 years ago. Her smoking use included cigarettes. She started smoking about 45 years ago. She has a 37.00 pack-year smoking history. She has been exposed to tobacco smoke. She has never used smokeless tobacco. She reports that she does not drink alcohol and does not use drugs. Family History:  family history includes Heart Disease in her father; Hypertension in an other family member.      Review of Systems:  General: negative for fever, chills   Ophthalmic ROS: negative for eye pain or loss of vision  ENT ROS: negative for headaches, sore throat, nasal drainage  Respiratory: negative for cough, sputum, SOB  Cardiovascular: negative for chest pain, palpitations. Gastrointestinal: negative for abdominal pain, diarrhea, N/V  Hematology: negative for bleeding, blood clots, bruising or jaundice  Genito-Urinary:  negative for dysuria or incontinence  Musculoskeletal: negative for joint swelling, muscle pain  Neurological: negative for confusion, dizziness, headaches   Psychiatric: negative anxiety, depression  Dermatological: negative for rash    Medications:  Scheduled Meds:   dofetilide  250 mcg Oral 2 times per day    influenza virus vaccine  0.5 mL IntraMUSCular Prior to discharge    furosemide  40 mg IntraVENous TID    dilTIAZem  180 mg Oral Daily    predniSONE  20 mg Oral Daily    hydrALAZINE  50 mg Oral 3 times per day    metoprolol succinate  50 mg Oral Daily    sodium chloride flush  10 mL IntraVENous 2 times per day    ipratropium-albuterol  1 ampule Inhalation Q4H WA    clopidogrel  75 mg Oral Daily    isosorbide mononitrate  30 mg Oral Daily    [Held by provider] lisinopril  20 mg Oral Daily    rivaroxaban  20 mg Oral Daily with breakfast    rosuvastatin  20 mg Oral Daily    mometasone-formoterol  2 puff Inhalation BID      Continuous Infusions:   sodium chloride       PRN Meds:. HYDROmorphone, potassium chloride **OR** potassium alternative oral replacement **OR** potassium chloride, oxymetazoline, diphenhydrAMINE-zinc acetate, hydrALAZINE, melatonin, sodium chloride flush, sodium chloride, magnesium hydroxide, acetaminophen **OR** acetaminophen, albuterol     Physical Examination:  Vitals:    11/07/22 0730   BP: (!) 165/68   Pulse:    Resp:    Temp: 97.9 °F (36.6 °C)   SpO2: 98%        Intake/Output Summary (Last 24 hours) at 11/7/2022 0825  Last data filed at 11/7/2022 0509  Gross per 24 hour   Intake 140 ml   Output 3110 ml   Net -2970 ml     In: 350 [P.O.:330;  I.V.:20]  Out: 3110    Wt Readings from Last 3 Encounters:   11/07/22 227 lb 8.2 oz (103.2 kg)   10/13/22 228 lb (103.4 kg)   22 219 lb (99.3 kg)     Temp  Av °F (36.7 °C)  Min: 97.4 °F (36.3 °C)  Max: 98.5 °F (36.9 °C)  Pulse  Av.6  Min: 39  Max: 147  BP  Min: 108/53  Max: 165/68  SpO2  Av.1 %  Min: 96 %  Max: 100 %  FiO2   Av.7 %  Min: 30 %  Max: 31 %    Telemetry: Sinus rhythm in the 60s. Constitutional: Alert, in no acute distress. Appears stated age. Head: Normocephalic and atraumatic. Eyes: Conjunctivae normal. EOM are normal.   Neck: Neck supple. No lymphadenopathy. No rigidity. No JVD present. Cardiovascular: Normal rate, regular rhythm. No murmurs, rubs or gallops. No S3 or S4.  Pulmonary/Chest: Diminished breath sounds bilaterally. No crackles, wheezes or rhonchi. No respiratory accessory muscle use. Abdominal: Soft. Normal bowel sounds present. No distension, No tenderness. Musculoskeletal: No tenderness. Trace BLE edema    Lymphadenopathy: Has no cervical adenopathy. Neurological: Alert and oriented. No gross deficits. Skin: Skin is warm and dry. No rash, lesions, ulcerations noted. Psychiatric: No anxiety nor agitation. Labs:  Reviewed.    Recent Labs     22  0421    142 141   K 3.8 4.3 4.3   CL 92* 95* 94*   CO2 39* 37* 36*   BUN 43* 44* 42*   CREATININE 1.5* 1.4* 1.4*     Recent Labs     226 22  0421   WBC 15.7* 13.5*   HGB 9.1* 9.6*   HCT 28.9* 30.0*   MCV 88.1 88.3    203     Lab Results   Component Value Date/Time    TROPONINI 0.11 2022 03:03 PM     No results found for: BNP  Lab Results   Component Value Date/Time    PROTIME 17.0 2022 04:46 AM    PROTIME 26.7 2022 03:42 PM    PROTIME 16.4 2022 04:18 AM    INR 1.38 2022 04:46 AM    INR 2.45 2022 03:42 PM    INR 1.33 2022 04:18 AM     Lab Results   Component Value Date/Time    CHOL 107 09/10/2021 07:22 AM    HDL 56 09/10/2021 07:22 AM    HDL 38 2011 03:35 PM    TRIG 59 09/10/2021 07:22 AM       Diagnostic and imaging results reviewed. EC22  SR with atrial runs, PACs. Inferolateral ST depression. Echo:  11/3/22   Borderline conc. LVH with normal wall motion; EF   70%. Grade II diastolic   dysfunction with elevated LH filling pressures. The left atrium is severely dilated. The right ventricle is normal in size and function. Mild mitral, aortic and tricuspid regurgitation. LHC: 3/5/18  OVERALL IMPRESSION  1. Again, 100% proximal right coronary artery occlusion with left to right  collaterals. 2.  Mild disease of the distal left main trunk. 3.  20% to 30% ostial left anterior descending artery stenosis with  collaterals from LAD to the right coronary artery. 4.  30% to 40% stenosis of the proximal circumflex artery. 5.  Normal left ventricular systolic function with estimated EF of 55% to  60%. 6.  Slightly enlarged aortic root with no evidence of aortic stenosis or  Regurgitation. RHC: 20  OVERALL IMPRESSION:  1.  _____ normal right cardiac pressure. 2.  Elevated pulmonary capillary wedge pressure with a mean pulmonary  capillary wedge of 29 mmHg. 3.  Normal cardiac output and indices. 4.  No oxygen step off to suggest ASD/PFO. Stress: 18  Conclusions          Summary     Abnormal study. There is a moderate sized, mild intensity, partially     reversible defect of the mid to apical anterior and mid anterolateral walls     which is consistent with ischemia. There is breast attenuation but stress     images appear worse. Normal LV size and systolic function. Overall findings represent an intermediate risk study. Procedures:  1. PVI, roof line (for L flutter), anterior line ablation (for L flutter) 10/7/20, Dr. Ian Nye  2. Successful DCCV for persistent A fib, 21, Dr. Ian Nye  3.  PVI for L flutter, roof line (for L flutter), anterior wall (L flutter), appendage ablation (for L flutter) and low anterior wall ablation (for L flutter), 2/17/2, Dr. Contreras Client. 4. Atrial fibrillation (PVI, CAFE), left atrial flutter (roof line) and left atrial tachycardia ablation on 2/4/22, Dr. Contreras Client  5. Successful DCCV for atrial fibrillation, 4/15/22, Dr. Hays Button:    PSVT   - was having frequent but brief periods of SVT over the weekend, ? AT versus atrial flutter   - seems to now be suppressed with the addition of diltiazem   - also on Toprol and dofetilide but want to avoid chances of bradycardia so will increase diltiazem to 120mg BID and stop Toprol   - may also be improving with diuresis    Persistent atrial fibrillation             - s/p PVI ablation on 10/7/20 with Dr. Contreras Client, had recurrent A fib noted in January 2021 and had successful DCCV later that month              - s/p PVI again in February 2021 and February 2022           - back in atrial fibrillation on 3/21/22           - s/p DCCV on 4/15/22, recurrence of A fib on EKG on 5/5/22, converted on 5/25/22                - CHADS2-VASc 5 (age, gender, HTN, HF, CAD) on Xarelto 20mg QD (CrCl 55mL/min with actual age)              - on dofetilide 250mcg BID - continue     Left atrial flutter           - seen on EP study s/p roof line and anterior line ablations 10/7/20           - repeat ablation detailed above on 2/17/21            - had recurrent atrial flutter when seen in January 2022 s/p repeat ablation 2/4/22           - see above                Acute on chronic diastolic heart failure             - EF 60%             - being diuresed by general cardiology     CAD              - stable              - on statin, beta blocker     AAA              - s/p repair in 2018 by Dr. Moiz Jean HTN           - continue medical therapy    Discussed with Dr. Sofiya Lyons. No further EP recommendations, will sign off. Please call if concerns.     CHRISTINE Benjamin  [facility] [place] [address], [address] [place], [place] [address]shalini Shah, [address] [facility]  Phone: [phone]  Fax: [phone]    Electronically signed by CHRISTINE Sorto CNP on 11/7/2022 at 8:25 AM

## 2022-11-07 NOTE — PROGRESS NOTES
Pt is a difficult access patient. She has several bruises covering her BUE from needle sticks. She has reported having to be stuck multiple times for PIVs and lab draws. Pt expressed frustration with having to be stuck for labs morning and contemplated refusing this mornings labs. RN reiterated reasons for labs being drawn and encouraged pt to talk with her day shift provider about changing the labs to every other day. Pt RAFAEL and RN wrote a reminder for her to follow up on the white board.

## 2022-11-07 NOTE — CARE COORDINATION
Met with patient. Patient confirms she'll return home with  at discharge. Patient agrees to [address] at discharge, orders already placed by MD. [facility] aware of poss dc tomorrow. Patient's  will transport home and bring O2 tank up for transport. Patient denies additional needs.   Electronically signed by Kaleb Sagastume RN Case Management on 11/7/2022 at 4:01 PM

## 2022-11-07 NOTE — PROGRESS NOTES
Pt's HR dropped into the 30's while sleeping and sustained for ~ 20 seconds per CMU. RN asked CMU to place a strip in the chart for review. RN validated strip.

## 2022-11-08 VITALS
HEART RATE: 54 BPM | SYSTOLIC BLOOD PRESSURE: 135 MMHG | WEIGHT: 224.87 LBS | DIASTOLIC BLOOD PRESSURE: 69 MMHG | HEIGHT: 64 IN | BODY MASS INDEX: 38.39 KG/M2 | RESPIRATION RATE: 21 BRPM | OXYGEN SATURATION: 98 % | TEMPERATURE: 97.9 F

## 2022-11-08 LAB
ANION GAP SERPL CALCULATED.3IONS-SCNC: 13 MMOL/L (ref 3–16)
BUN BLDV-MCNC: 49 MG/DL (ref 7–20)
CALCIUM SERPL-MCNC: 9.3 MG/DL (ref 8.3–10.6)
CHLORIDE BLD-SCNC: 92 MMOL/L (ref 99–110)
CO2: 36 MMOL/L (ref 21–32)
CREAT SERPL-MCNC: 1.3 MG/DL (ref 0.6–1.2)
GFR SERPL CREATININE-BSD FRML MDRD: 42 ML/MIN/{1.73_M2}
GLUCOSE BLD-MCNC: 101 MG/DL (ref 70–99)
POTASSIUM SERPL-SCNC: 4 MMOL/L (ref 3.5–5.1)
PRO-BNP: 1817 PG/ML (ref 0–449)
SODIUM BLD-SCNC: 141 MMOL/L (ref 136–145)

## 2022-11-08 PROCEDURE — 94761 N-INVAS EAR/PLS OXIMETRY MLT: CPT

## 2022-11-08 PROCEDURE — 6370000000 HC RX 637 (ALT 250 FOR IP): Performed by: NURSE PRACTITIONER

## 2022-11-08 PROCEDURE — 6370000000 HC RX 637 (ALT 250 FOR IP): Performed by: INTERNAL MEDICINE

## 2022-11-08 PROCEDURE — 6370000000 HC RX 637 (ALT 250 FOR IP): Performed by: HOSPITALIST

## 2022-11-08 PROCEDURE — 99233 SBSQ HOSP IP/OBS HIGH 50: CPT | Performed by: INTERNAL MEDICINE

## 2022-11-08 PROCEDURE — 36415 COLL VENOUS BLD VENIPUNCTURE: CPT

## 2022-11-08 PROCEDURE — 2700000000 HC OXYGEN THERAPY PER DAY

## 2022-11-08 PROCEDURE — 80048 BASIC METABOLIC PNL TOTAL CA: CPT

## 2022-11-08 PROCEDURE — 83880 ASSAY OF NATRIURETIC PEPTIDE: CPT

## 2022-11-08 PROCEDURE — 94640 AIRWAY INHALATION TREATMENT: CPT

## 2022-11-08 PROCEDURE — 99232 SBSQ HOSP IP/OBS MODERATE 35: CPT | Performed by: INTERNAL MEDICINE

## 2022-11-08 PROCEDURE — 2580000003 HC RX 258: Performed by: NURSE PRACTITIONER

## 2022-11-08 RX ORDER — ISOSORBIDE MONONITRATE 60 MG/1
60 TABLET, EXTENDED RELEASE ORAL
Status: DISCONTINUED | OUTPATIENT
Start: 2022-11-09 | End: 2022-11-08 | Stop reason: HOSPADM

## 2022-11-08 RX ORDER — DOFETILIDE 0.25 MG/1
250 CAPSULE ORAL EVERY 12 HOURS SCHEDULED
Qty: 60 CAPSULE | Refills: 0 | Status: SHIPPED | OUTPATIENT
Start: 2022-11-08 | End: 2022-11-25

## 2022-11-08 RX ORDER — DILTIAZEM HYDROCHLORIDE 120 MG/1
120 CAPSULE, COATED, EXTENDED RELEASE ORAL 2 TIMES DAILY
Qty: 60 CAPSULE | Refills: 0 | Status: SHIPPED | OUTPATIENT
Start: 2022-11-08

## 2022-11-08 RX ORDER — FUROSEMIDE 20 MG/1
60 TABLET ORAL 2 TIMES DAILY
Qty: 180 TABLET | Refills: 0 | Status: SHIPPED | OUTPATIENT
Start: 2022-11-08 | End: 2022-11-23

## 2022-11-08 RX ORDER — DILTIAZEM HYDROCHLORIDE 120 MG/1
120 CAPSULE, COATED, EXTENDED RELEASE ORAL 2 TIMES DAILY
Status: DISCONTINUED | OUTPATIENT
Start: 2022-11-08 | End: 2022-11-08 | Stop reason: HOSPADM

## 2022-11-08 RX ORDER — HYDRALAZINE HYDROCHLORIDE 50 MG/1
50 TABLET, FILM COATED ORAL EVERY 8 HOURS SCHEDULED
Qty: 90 TABLET | Refills: 0 | Status: SHIPPED | OUTPATIENT
Start: 2022-11-08

## 2022-11-08 RX ORDER — POTASSIUM CHLORIDE 20 MEQ/1
20 TABLET, EXTENDED RELEASE ORAL 2 TIMES DAILY WITH MEALS
Qty: 60 TABLET | Refills: 0 | Status: SHIPPED | OUTPATIENT
Start: 2022-11-08

## 2022-11-08 RX ORDER — ISOSORBIDE MONONITRATE 60 MG/1
60 TABLET, EXTENDED RELEASE ORAL DAILY
Status: DISCONTINUED | OUTPATIENT
Start: 2022-11-09 | End: 2022-11-08

## 2022-11-08 RX ORDER — ISOSORBIDE MONONITRATE 60 MG/1
60 TABLET, EXTENDED RELEASE ORAL
Qty: 30 TABLET | Refills: 0 | Status: SHIPPED | OUTPATIENT
Start: 2022-11-09

## 2022-11-08 RX ADMIN — IPRATROPIUM BROMIDE AND ALBUTEROL SULFATE 1 AMPULE: .5; 3 SOLUTION RESPIRATORY (INHALATION) at 08:16

## 2022-11-08 RX ADMIN — IPRATROPIUM BROMIDE AND ALBUTEROL SULFATE 1 AMPULE: .5; 3 SOLUTION RESPIRATORY (INHALATION) at 12:21

## 2022-11-08 RX ADMIN — CLOPIDOGREL BISULFATE 75 MG: 75 TABLET ORAL at 09:09

## 2022-11-08 RX ADMIN — ROSUVASTATIN CALCIUM 20 MG: 20 TABLET, FILM COATED ORAL at 09:09

## 2022-11-08 RX ADMIN — HYDRALAZINE HYDROCHLORIDE 50 MG: 50 TABLET, FILM COATED ORAL at 13:15

## 2022-11-08 RX ADMIN — RIVAROXABAN 20 MG: 20 TABLET, FILM COATED ORAL at 09:09

## 2022-11-08 RX ADMIN — HYDRALAZINE HYDROCHLORIDE 50 MG: 50 TABLET, FILM COATED ORAL at 05:57

## 2022-11-08 RX ADMIN — SODIUM CHLORIDE, PRESERVATIVE FREE 10 ML: 5 INJECTION INTRAVENOUS at 09:10

## 2022-11-08 RX ADMIN — DOFETILIDE 250 MCG: 0.12 CAPSULE ORAL at 09:08

## 2022-11-08 RX ADMIN — FUROSEMIDE 60 MG: 40 TABLET ORAL at 09:08

## 2022-11-08 RX ADMIN — PREDNISONE 20 MG: 20 TABLET ORAL at 09:09

## 2022-11-08 RX ADMIN — DILTIAZEM HYDROCHLORIDE 120 MG: 120 CAPSULE, COATED, EXTENDED RELEASE ORAL at 09:08

## 2022-11-08 RX ADMIN — POTASSIUM CHLORIDE 20 MEQ: 1500 TABLET, EXTENDED RELEASE ORAL at 09:09

## 2022-11-08 RX ADMIN — ISOSORBIDE MONONITRATE 30 MG: 30 TABLET, EXTENDED RELEASE ORAL at 09:08

## 2022-11-08 RX ADMIN — MOMETASONE FUROATE AND FORMOTEROL FUMARATE DIHYDRATE 2 PUFF: 200; 5 AEROSOL RESPIRATORY (INHALATION) at 08:26

## 2022-11-08 ASSESSMENT — PAIN SCALES - GENERAL: PAINLEVEL_OUTOF10: 0

## 2022-11-08 NOTE — PROGRESS NOTES
Pulmonary Critical Care Progress Note     Patient's name:  Gary Castaneda  Medical Record Number: [id number]  Patient's account/billing number: [de-identified]  Patient's YOB: 1945  Age: 69 y.o. Date of Admission: 11/2/2022  7:21 PM  Date of Consult: 11/8/2022      Primary Care Physician: Colleen Guerra MD      Code Status: Full Code    Chief complaint: respiratory failure     Assessment and Plan     Acute on chronic hypoxemic respiratory failure   Severe COPD  Acute on chronic CHF  Atrial fibrillation    Plan:  Ok to d/c from pulmonary stand point   Bronchodilators  O2 @ baseline  Advance pt/to       Overnight:  No fever  Sob improving    REVIEW OF SYSTEMS:  Review of Systems -   General ROS: negative  Psychological ROS: negative  Ophthalmic ROS: negative  ENT ROS: negative  Allergy and Immunology ROS: negative  Hematological and Lymphatic ROS: negative  Endocrine ROS: negative  Breast ROS: negative  Respiratory ROS: cough, wheezing, and sob  Cardiovascular ROS: sob with exertion   Gastrointestinal ROS:negative  Genito-Urinary ROS: negative  Musculoskeletal ROS: negative  Neurological ROS: negative  Dermatological ROS: negative        Physical Exam:    Vitals: /69   Pulse 54   Temp 97.9 °F (36.6 °C) (Axillary)   Resp 21   Ht 5' 4\" (1.626 m)   Wt 224 lb 13.9 oz (102 kg)   SpO2 98%   BMI 38.60 kg/m²     Last Body weight:   Wt Readings from Last 3 Encounters:   11/08/22 224 lb 13.9 oz (102 kg)   10/13/22 228 lb (103.4 kg)   09/29/22 219 lb (99.3 kg)       Body Mass Index : Body mass index is 38.6 kg/m². Intake and Output summary:   Intake/Output Summary (Last 24 hours) at 11/8/2022 1429  Last data filed at 11/8/2022 0855  Gross per 24 hour   Intake 940 ml   Output 2200 ml   Net -1260 ml       Physical Examination:     Gen:  No acute distress. Eyes: PERRL. Anicteric sclera. No conjunctival injection. ENT: No discharge. Posterior oropharynx clear.  External appearance of ears and nose normal.  Neck: Trachea midline. No mass   Resp:  severely diminished bilaterally with prolonged expiratory phase. CV: Regular rate. Regular rhythm. No murmur or rub. No edema. GI: Soft, Non-tender. Non-distended. +BS  Skin: Warm, dry, w/o erythema. Lymph: No cervical or supraclavicular LAD. M/S: No cyanosis. No clubbing. Neuro:  CN 2-12 tested, no focal neurologic deficit. Moves all extremities  Psych: Awake and alert, Oriented x 3. Judgement and insight appropriate. Mood stable. Laboratory findings:-    CBC:   Recent Labs     11/07/22 0421   WBC 13.5*   HGB 9.6*        BMP:    Recent Labs     11/06/22 0446 11/07/22 0421 11/08/22 0444    141 141   K 4.3 4.3 4.0   CL 95* 94* 92*   CO2 37* 36* 36*   BUN 44* 42* 49*   CREATININE 1.4* 1.4* 1.3*   GLUCOSE 109* 106* 101*     S. Calcium:  Recent Labs     11/08/22 0444   CALCIUM 9.3       S. Magnesium:  Recent Labs     11/06/22  0944   MG 1.90           Radiology Review:  Pertinent images / reports were reviewed as a part of this visit.                      Alvaro Alvarado MD, M.D.            11/8/2022, 2:29 PM

## 2022-11-08 NOTE — PLAN OF CARE
Problem: Discharge Planning  Goal: Discharge to home or other facility with appropriate resources  11/8/2022 0019 by James Palafox RN  Outcome: Progressing  Flowsheets (Taken 11/8/2022 0019)  Discharge to home or other facility with appropriate resources:   Identify barriers to discharge with patient and caregiver   Arrange for needed discharge resources and transportation as appropriate   Identify discharge learning needs (meds, wound care, etc)   Refer to discharge planning if patient needs post-hospital services based on physician order or complex needs related to functional status, cognitive ability or social support system     Problem: Chronic Conditions and Co-morbidities  Goal: Patient's chronic conditions and co-morbidity symptoms are monitored and maintained or improved  11/8/2022 0019 by James Palafox RN  Outcome: Progressing  Flowsheets (Taken 11/8/2022 0019)  Care Plan - Patient's Chronic Conditions and Co-Morbidity Symptoms are Monitored and Maintained or Improved:   Monitor and assess patient's chronic conditions and comorbid symptoms for stability, deterioration, or improvement   Collaborate with multidisciplinary team to address chronic and comorbid conditions and prevent exacerbation or deterioration   Update acute care plan with appropriate goals if chronic or comorbid symptoms are exacerbated and prevent overall improvement and discharge     Problem: Pain  Goal: Verbalizes/displays adequate comfort level or baseline comfort level  11/8/2022 0019 by James Palafox RN  Outcome: Progressing  Flowsheets (Taken 11/8/2022 0019)  Verbalizes/displays adequate comfort level or baseline comfort level:   Encourage patient to monitor pain and request assistance   Assess pain using appropriate pain scale   Administer analgesics based on type and severity of pain and evaluate response   Implement non-pharmacological measures as appropriate and evaluate response     Problem: Respiratory - Adult  Goal: Achieves optimal ventilation and oxygenation  11/8/2022 0019 by Doni Chaudhary RN  Outcome: Progressing  Flowsheets (Taken 11/8/2022 0019)  Achieves optimal ventilation and oxygenation:   Assess for changes in respiratory status   Assess for changes in mentation and behavior   Position to facilitate oxygenation and minimize respiratory effort   Assess and instruct to report shortness of breath or any respiratory difficulty     Problem: Cardiovascular - Adult  Goal: Maintains optimal cardiac output and hemodynamic stability  11/8/2022 0019 by Doni Chaudhary RN  Outcome: Progressing  Flowsheets (Taken 11/8/2022 0019)  Maintains optimal cardiac output and hemodynamic stability:   Monitor blood pressure and heart rate   Monitor urine output and notify Licensed Independent Practitioner for values outside of normal range   Assess for signs of decreased cardiac output     Problem: Cardiovascular - Adult  Goal: Absence of cardiac dysrhythmias or at baseline  11/8/2022 0019 by Doni Chaudhary RN  Outcome: Progressing  Flowsheets (Taken 11/8/2022 0019)  Absence of cardiac dysrhythmias or at baseline:   Monitor cardiac rate and rhythm   Assess for signs of decreased cardiac output   Administer antiarrhythmia medication and electrolyte replacement as ordered     Problem: Metabolic/Fluid and Electrolytes - Adult  Goal: Electrolytes maintained within normal limits  11/8/2022 0019 by Doni Chaudhary RN  Outcome: Progressing  Flowsheets (Taken 11/8/2022 0019)  Electrolytes maintained within normal limits:   Monitor labs and assess patient for signs and symptoms of electrolyte imbalances   Monitor response to electrolyte replacements, including repeat lab results as appropriate   Administer electrolyte replacement as ordered     Problem: Metabolic/Fluid and Electrolytes - Adult  Goal: Hemodynamic stability and optimal renal function maintained  11/8/2022 0019 by Doni Chaudhary RN  Outcome: Progressing  Flowsheets (Taken 11/8/2022 0019)  Hemodynamic stability and optimal renal function maintained:   Monitor labs and assess for signs and symptoms of volume excess or deficit   Monitor intake, output and patient weight   Monitor response to interventions for patient's volume status, including labs, urine output, blood pressure (other measures as available)   Encourage oral intake as appropriate   Instruct patient on fluid and nutrition restrictions as appropriate     Problem: Skin/Tissue Integrity - Adult  Goal: Skin integrity remains intact  11/8/2022 0019 by Carrol Malone RN  Outcome: Progressing  Flowsheets (Taken 11/8/2022 0019)  Skin Integrity Remains Intact:   Monitor for areas of redness and/or skin breakdown   Assess vascular access sites hourly     Problem: Musculoskeletal - Adult  Goal: Return mobility to safest level of function  11/8/2022 0019 by Carrol Malone RN  Outcome: Progressing  Flowsheets (Taken 11/8/2022 0019)  Return Mobility to Safest Level of Function:   Assess patient stability and activity tolerance for standing, transferring and ambulating with or without assistive devices   Assist with transfers and ambulation using safe patient handling equipment as needed   Ensure adequate protection for wounds/incisions during mobilization     Problem: Musculoskeletal - Adult  Goal: Return ADL status to a safe level of function  11/8/2022 0019 by Carrol Malone RN  Outcome: Progressing  Flowsheets (Taken 11/8/2022 0019)  Return ADL Status to a Safe Level of Function:   Administer medication as ordered   Assess activities of daily living deficits and provide assistive devices as needed   Assist and instruct patient to increase activity and self care as tolerated     Problem: Genitourinary - Adult  Goal: Absence of urinary retention  11/8/2022 0019 by Carrol Malone RN  Outcome: Progressing  Flowsheets (Taken 11/8/2022 0019)  Absence of urinary retention:   Assess patients ability to void and empty bladder   Monitor intake/output and perform bladder scan as needed   Place urinary catheter per Licensed Independent Practitioner order if needed

## 2022-11-08 NOTE — DISCHARGE SUMMARY
Hospital Medicine Discharge Summary    Patient ID: Jasper Michaud      Patient's PCP: Patito Lacy MD    Admit Date: 11/2/2022     Discharge Date:   11/08/22    Admitting Provider: Carmel Escobar DO     Discharge Provider: Petey Herring MD     Discharge Diagnoses: Active Hospital Problems    Diagnosis     PSVT (paroxysmal supraventricular tachycardia) ([facility]) [I47.1]      Priority: Medium    Acute on chronic diastolic heart failure ([facility]) [I50.33]      Priority: Medium    Coronary artery disease involving native coronary artery of native heart without angina pectoris [I25.10]      Priority: Medium    Drug-induced insomnia ([facility]) [F19.982]      Priority: Medium    Acute on chronic respiratory failure with hypoxia ([facility]) [J96.21]      Priority: Medium    LI (dyspnea on exertion) [R06.09]      Priority: Medium    Stage 3 severe COPD by GOLD classification ([facility] [address] [J44.9]     Abnormal CXR [R93.89]        The patient was seen and examined on day of discharge and this discharge summary is in conjunction with any daily progress note from day of discharge. Hospital Course:    Acute on chronic respiratory failure.   - back to her baseline o2 requirements, 4L     COPD Exacerbation  Steroids and bronchodilators and antibiotics  bronchial higiene  Consult to pulmonary     Acute on chronic diastolic CHF  Ejection fraction 70% and grade 2 diastolic dysfunction  - cont lasix, BB, ACEi  - daily wts and I/Os  - Cardiology consulted; Right heart cath done 11/4 positive for pulmonary hypertension  - Switch to po lasix      CAD with PCI and JORJE 9/20/2022  - Successful PCI of proximal Lcx with DESx2  - continue bb, acei, imdur, hydralazine and nitro prn  - continue asa, plavix and statin     Elevated troponin secondary to type II NSTEMI  Atrial Fibrillation   - currently with RVR  - continue Xarelto, Cardizem and dofetilide   - metoprolol discontinued      Hypertensive urgency at the time of admission  -Blood pressure control has improved  Continue lisinopril and Toprol-XL     Debility  Home care orders placed       Physical Exam Performed:     /69   Pulse 53   Temp 97.9 °F (36.6 °C) (Axillary)   Resp 15   Ht 5' 4\" (1.626 m)   Wt 224 lb 13.9 oz (102 kg)   SpO2 98%   BMI 38.60 kg/m²       General appearance:  No apparent distress, appears stated age and cooperative. HEENT:  Normal cephalic, atraumatic without obvious deformity. Pupils equal, round, and reactive to light. Extra ocular muscles intact. Conjunctivae/corneas clear. Neck: Supple, with full range of motion. No jugular venous distention. Trachea midline. Respiratory:  Normal respiratory effort. Clear to auscultation, bilaterally without Rales/Wheezes/Rhonchi. Cardiovascular:  Regular rate and rhythm with normal S1/S2 without murmurs, rubs or gallops. Abdomen: Soft, non-tender, non-distended with normal bowel sounds. Musculoskeletal:  No clubbing, cyanosis or edema bilaterally. Full range of motion without deformity. Skin: Skin color, texture, turgor normal.  No rashes or lesions. Neurologic:  Neurovascularly intact without any focal sensory/motor deficits. Cranial nerves: II-XII intact, grossly non-focal.  Psychiatric:  Alert and oriented, thought content appropriate, normal insight  Capillary Refill: Brisk,< 3 seconds   Peripheral Pulses: +2 palpable, equal bilaterally       Labs:  For convenience and continuity at follow-up the following most recent labs are provided:      CBC:    Lab Results   Component Value Date/Time    WBC 13.5 11/07/2022 04:21 AM    HGB 9.6 11/07/2022 04:21 AM    HCT 30.0 11/07/2022 04:21 AM     11/07/2022 04:21 AM       Renal:    Lab Results   Component Value Date/Time     11/08/2022 04:44 AM    K 4.0 11/08/2022 04:44 AM    K 3.8 11/05/2022 04:46 AM    CL 92 11/08/2022 04:44 AM    CO2 36 11/08/2022 04:44 AM    BUN 49 11/08/2022 04:44 AM    CREATININE 1.3 11/08/2022 04:44 AM    CALCIUM 9.3 11/08/2022 04:44 AM    PHOS 4.3 09/29/2022 09:19 AM         Significant Diagnostic Studies    Radiology:   XR CHEST PORTABLE   Final Result   Perihilar indistinctness, suggestive of pulmonary edema. There is   progressive left perihilar consolidation as well, asymmetrical pulmonary   edema, versus aspiration pneumonitis or pneumonia. Stable, mild cardiomegaly since 09/10/2022. XR CHEST PORTABLE   Final Result   1. Pulmonary vascular congestion with questionable increased left perihilar   edema in apparent decreased right perihilar edema, potentially congestive   heart failure given mild cardiomegaly. Pneumonia could appear similar. 2. Questionable bibasilar airspace opacities potentially due to summation of   overlying tissues, atelectasis, or pneumonia. XR CHEST PORTABLE   Final Result   Mild pulmonary vascular congestion. Stable mild cardiomegaly. Consults:     IP CONSULT TO HOSPITALIST  IP CONSULT TO PULMONOLOGY  IP CONSULT TO CARDIOLOGY  IP CONSULT TO HEART FAILURE NURSE/COORDINATOR  IP CONSULT TO DIETITIAN  IP CONSULT TO CARDIOLOGY  IP CONSULT TO SOCIAL WORK  IP CONSULT TO HOME CARE NEEDS    Disposition:  home     Condition at Discharge: Stable    Discharge Instructions/Follow-up:  Follow-up with PCP within 7 days from discharge, not doing so could have life-threatening consequences. Take medication as instructed;  return to the emergency department if persistent symptoms, experiencing side effects from medication including nausea vomiting or unable to keep medications down.    Follow up with cardiology recommended    Code Status:  Full Code     Activity: activity as tolerated    Diet: cardiac diet      Discharge Medications:     Current Discharge Medication List             Details   dilTIAZem (CARDIZEM CD) 120 MG extended release capsule Take 1 capsule by mouth in the morning and at bedtime  Qty: 60 capsule, Refills: 0      potassium chloride (KLOR-CON M) 20 MEQ extended release tablet Take 1 tablet by mouth 2 times daily (with meals)  Qty: 60 tablet, Refills: 0                Details   isosorbide mononitrate (IMDUR) 60 MG extended release tablet Take 1 tablet by mouth Daily with lunch  Qty: 30 tablet, Refills: 0      dofetilide (TIKOSYN) 250 MCG capsule Take 1 capsule by mouth every 12 hours  Qty: 60 capsule, Refills: 0      hydrALAZINE (APRESOLINE) 50 MG tablet Take 1 tablet by mouth every 8 hours  Qty: 90 tablet, Refills: 0      furosemide (LASIX) 20 MG tablet Take 3 tablets by mouth in the morning and 3 tablets in the evening. Qty: 180 tablet, Refills: 0                Details   albuterol (PROVENTIL) (2.5 MG/3ML) 0.083% nebulizer solution Take 2.5 mg by nebulization every 4 hours as needed for Wheezing      clobetasol (TEMOVATE) 0.05 % cream Apply topically 2 times daily as needed      rivaroxaban (XARELTO) 20 MG TABS tablet TAKE ONE TABLET BY MOUTH DAILY WITH BREAKFAST  Qty: 30 tablet, Refills: 11      clopidogrel (PLAVIX) 75 MG tablet Take 1 tablet by mouth daily  Qty: 30 tablet, Refills: 3      SYMBICORT 160-4.5 MCG/ACT AERO Inhale 2 puffs into the lungs in the morning and 2 puffs before bedtime. Qty: 1 each, Refills: 11    Associated Diagnoses: COPD, severe (HCC)      vitamin C (ASCORBIC ACID) 500 MG tablet Take 500 mg by mouth daily      albuterol sulfate  (90 Base) MCG/ACT inhaler INHALE TWO PUFFS BY MOUTH EVERY 4 HOURS AS NEEDED FOR WHEEZING OR FOR SHORTNESS OF BREATH  Qty: 1 each, Refills: 3      rosuvastatin (CRESTOR) 20 MG tablet Take 1 tablet by mouth daily  Qty: 90 tablet, Refills: 2    Associated Diagnoses: Hyperlipidemia LDL goal <70      Multiple Vitamins-Minerals (MULTI FOR HER 50+ PO) Take 1 tablet by mouth daily             Time Spent on discharge is more than 30 minutes in the examination, evaluation, counseling and review of medications and discharge plan.       Signed:    Elaine Hurtado MD   11/8/2022      Thank you Papa Ardon MD for the opportunity to be involved in this patient's care. If you have any questions or concerns, please feel free to contact me at [phone].

## 2022-11-08 NOTE — PROGRESS NOTES
IV and telemetry monitor removed. Discharge paperwork completed and discussed with the patient and her . All questions answered. Patient has been made aware of follow up appointments that have been made and patient verbalized understanding. Patient received medications from [facility]. All belongings have been packed up and sent with the patient. Patient will be driven home by her .

## 2022-11-08 NOTE — NURSE NAVIGATOR
DC order noted. Pt was seen in consult by HF RN, see note. Pt has received >60 mins of HF education. HF dc instructions are on AVS as well as TOMEKA [facility] Cardiology f/u appt is in place for 11/15 at 3:30 with Dr Kaitlin Valero. Bedside RN updated. DC wt at dry wt of 224 lbs.

## 2022-11-08 NOTE — PROGRESS NOTES
[facility]   Daily Progress Note      Admit Date:  11/2/2022   This is a 78-year-old female very well  known to me from before with a history of paroxysmal atrial  fibrillation, status post multiple ablation, CAD, recurrent CHF, COPD,  hypertension, hyperlipidemia came to the hospital with symptoms suddenly  increased shortness of breath. She is on chronic 4 L of oxygen, but her  oxygen level dropped down to 60% with this increased shortness of  breath. She also at that time had a hard blood pressure going into 300  range per patient. Since her oxygen level did not improve, she was  brought to the hospital.  She is being admitted for acute on chronic  hypoxic respiratory failure. She denies any chest tightness or  pressure. One night she had episodes of rapid atrial fibrillation. She  had no swelling in her lower extremities. CC: \"    Interval history 11/7/2022  -Patient had episodes of rapid atrial fibrillation over the weekend and was symptomatic from it. She had a right heart catheterization on 11/4/2022 which showed that she was in heart failure  Today she feels much better. .  She is now down to 2 to 1/2 L of oxygen. (Her home requirement was 3 to 4 L).   She has remained in sinus rhythm for last 24 hours  Objective:   BP (!) 168/58   Pulse 50   Temp 98.1 °F (36.7 °C) (Oral)   Resp 15   Ht 5' 4\" (1.626 m)   Wt 224 lb 13.9 oz (102 kg)   SpO2 97%   BMI 38.60 kg/m²     Intake/Output Summary (Last 24 hours) at 11/8/2022 0918  Last data filed at 11/8/2022 0855  Gross per 24 hour   Intake 1120 ml   Output 2200 ml   Net -1080 ml     Wt Readings from Last 3 Encounters:   11/08/22 224 lb 13.9 oz (102 kg)   10/13/22 228 lb (103.4 kg)   09/29/22 219 lb (99.3 kg)     Telemetry:nsr    Physical Exam:  General:  NAD, Awake, alert and oriented X4  Skin:  Warm and dry  Neck:  Supple, no JVP appreciated, no bruit  Chest: Diminished breath sounds with no significant crackles identified  Cardiovascular:  Regular rate. S1S2  Abdomen:  Soft, nontender, +bowel sounds  Extremities:  No LE edema    Cardiac Diagnosis:  hypertension, hyperlipidemia, CHF, and atrial fibrillation    Medications:    dofetilide  250 mcg Oral 2 times per day    furosemide  60 mg Oral BID    potassium chloride  20 mEq Oral BID WC    rivaroxaban  20 mg Oral Daily with breakfast    dilTIAZem  120 mg Oral BID    influenza virus vaccine  0.5 mL IntraMUSCular Prior to discharge    hydrALAZINE  50 mg Oral 3 times per day    sodium chloride flush  10 mL IntraVENous 2 times per day    ipratropium-albuterol  1 ampule Inhalation Q4H WA    clopidogrel  75 mg Oral Daily    isosorbide mononitrate  30 mg Oral Daily    rosuvastatin  20 mg Oral Daily    mometasone-formoterol  2 puff Inhalation BID      sodium chloride       diphenhydrAMINE, HYDROmorphone, potassium chloride **OR** potassium alternative oral replacement **OR** potassium chloride, diphenhydrAMINE-zinc acetate, hydrALAZINE, melatonin, sodium chloride flush, sodium chloride, magnesium hydroxide, acetaminophen **OR** acetaminophen, albuterol    Lab Data:  CBC:   Recent Labs     11/07/22  0421   WBC 13.5*   HGB 9.6*        BMP:    Recent Labs     11/06/22  0446 11/07/22  0421    141   K 4.3 4.3   CO2 37* 36*   BUN 44* 42*   CREATININE 1.4* 1.4*     LIVR: No results for input(s): AST, ALT in the last 72 hours. INR:    No results for input(s): INR in the last 72 hours. APTT: No results for input(s): APTT in the last 72 hours. BNP:  No results for input(s): BNP in the last 72 hours. Imaging:[facility] 9/20/2022  CORONARY ANGIOGRAPHY:  1. Left main trunk: It arises from the left sinus of Valsalva. It  divides into LAD and circumflex artery. Left main trunk has mild  disease, but it is not hemodynamically significant. 2.  Left anterior descending artery: It arises from left main trunk.    It is a tortuous artery and there appears to be a mild plaque of the  ostium. Remainder of the LAD appears free of atherosclerosis. 3.  Left circumflex artery:  The left circumflex artery is a large  artery and it has a 75% stenosis in the proximal segment. There is mild  disease in the obtuse marginal branch distally. There are collaterals  being supplied from the coronary system to the right coronary artery. 4.  RCA:  It arises from the right sinus of Valsalva. It is diffusely  diseased in the proximal and mid segment and it is completely blocked  after that. 5.  Left ventriculogram reveals a preserved left ventricular systolic  function with estimated EF of 55% to 60%. OVERALL IMPRESSION:  1. Mildly elevated right heart pressures. 2.  Normal cardiac output and indices. 3.  No oxygen step-up to suggest ASD or PFO. 4.  Patent left main trunk with mild disease. 5.  Tortuous LAD with mild osteal disease, not hemodynamically  significant. 6.  75% stenosis of the proximal circumflex artery with some mild  disease of the obtuse marginal branch, not hemodynamically significant. 7.  Left-to-right collaterals. 8.  100% occlusion of mid RCA. 9.  Normal left ventricular systolic function with preserved EF of 65%  to 70%. [facility]/PCI w Dr. Linda Cortes 9/20/2022  Interventions:  PCI performed to the proximal Lcx with DESx2  Resolute Mckay 3.5x22 mm  Resolute [place] 3. 5x8 mm  Excellent post angiographic result with BALWINDER 3 flow     [facility] 11/4/2022  RA 5  RV 53/9  PA 49/20 (28)  PCWP25  CI 3.8    Assessment:Plan  1 acute on chronic diastolic heart failure. NYHA class III on admission. Stage C  -appears well compensated today  -Weight is down to 224(3 pounds )since yesterday)  -Currently on isosorbide, diltiazem, Toprol and hydralazine.  -Since her blood pressure is still high we will space all her antihypertensive medicines.   -BP goal is less than 130/80  -Her lisinopril is currently on hold but will discontinue  -Since she has recurrent admissions for heart failure, will switch her to Tamar Tomas in outpatient setting. Her uncontrolled blood pressure, and recurrent atrial fibrillation playing a role in her CHF recurrences  -We will consider SGLT2 inhibitor therapy in outpatient setting. .      Recurrent paroxysmal atrial fibrillation  -Patient had no more episodes of rapid atrial fibrillation -Currently has sinus bradycardia on Cardizem and Tikosyn  -Patient was on Toprol at home but will currently discontinue since she had evidence of sinus bradycardia  -On chronic Xarelto therapy.  -Have discussed with EP  Hypertension  -Blood pressure still remains elevated. -Patient on numerous antihypertensive medicines  -We will space her BP medications. Okay okay for discharge from cardiac standpoint.   Patient needs to call the office to be seen at [facility] this Friday-      Electronically signed by Mikaela Negro MD on 11/8/2022 at 9:18 AM

## 2022-11-08 NOTE — CARE COORDINATION
Critical access hospital    DC order noted, all docs needed have been faxed to [facility] for home care services.     Home care to see patient by 11/11/22      Oliva Garcia RN, BSN CTN  Critical access hospital [phone]

## 2022-11-08 NOTE — TELEPHONE ENCOUNTER
Yes, per Dr. Kesha Patterson. Patient is being discharged today from [facility] and has a HFU Friday. If all okay at that time, okay to schedule for CardioMEMS. Dr. Percival Cushing is out this week but returns next week. Thanks.

## 2022-11-08 NOTE — CARE COORDINATION
DISCHARGE SUMMARY     DATE OF DISCHARGE: 11/8/2022    DISCHARGE DESTINATION: Home with family    Discharging to Facility/ Agency   Name:  [facility]    Address: [address] [place] [address]., [facility] [place] [address]  Phone: [phone]  Fax: [phone]      TRANSPORTATION: Family    Name:  Medina Skelton  Relationship:   Feroz Dayna up Time: At bedside ready to transport, just waiting on d/c paperwork. Phone Number: [phone]    COMMENTS: SW met with patient and  at bedside regarding discharge needs. They are in agreement with home care.  brought in her inogen oxygen concentrator so she can use it on transport home. No further needs noted unless indicated by patient, family and/or medical staff. Respectfully submitted,    ERON Glez  [facility]   [phone]    Electronically signed by ERON Cha on 11/8/2022 at 1:58 PM

## 2022-11-09 ENCOUNTER — CARE COORDINATION (OUTPATIENT)
Dept: CASE MANAGEMENT | Age: 77
End: 2022-11-09

## 2022-11-09 NOTE — CARE COORDINATION
[facility] Care Transitions Initial Follow Up Call    Call within 2 business days of discharge: Yes    Care Transition Nurse contacted the patient by telephone to perform post hospital discharge assessment. Verified name and  with patient as identifiers. Provided introduction to self, and explanation of the Care Transition Nurse role. Patient: Pamela Castro Patient : 1945   MRN: [id number]  Reason for Admission: LI  Discharge Date: 22 RARS: Readmission Risk Score: 20.3      Last Discharge  Street       Date Complaint Diagnosis Description Type Department Provider    22 Shortness of Breath LI (dyspnea on exertion) . .. ED to Hosp-Admission (Discharged) (ADMITTED) Kenyon Crawford MD; Yoana .. Was this an external facility discharge? No Discharge Facility: [facility] to be reviewed by the provider   Additional needs identified to be addressed with provider: No                 Method of communication with provider: none. Initial attempt at CT discharge phone call. Carrol Feliciano states she is doing \"okay\". She states she has not heard from [facility]. Carrol Feliciano states she uses home oxygen therapy continuously @ 3lpm. She states she has a pulse oximeter at home. She states her most recent O2 sat = 99%. She states she is up and about on her own for ambulation. Carrol Feliciano then stated she would need to end the conversation because she needs to go to the bathroom and the conversation was ended. CT team will follow. Call placed to [facility]. Attempting to confirm receipt of patient referral. Left message for [facility] from intake. Contact info provided. Requested return call to CTN. Offered patient enrollment in the Remote Patient Monitoring (RPM) program for in-home monitoring:  patient is not eligible - scheduled for cardio mems .     Care Transitions 24 Hour Call    Do you have all of your prescriptions and are they filled?: Yes  Do you have support at home?: Partner/Spouse/SO  Care Transitions Interventions         Follow Up  Future Appointments   Date Time Provider Kiana Puri   11/11/2022 11:00 AM MD Shruthi Baig [facility]   11/14/2022  1:00 PM Aritessie Hollins [facility]   12/12/2022  2:00 PM CHRISTINE Downey CNP [facility]   [facility]2023  [facility]       Care Transition Nurse provided contact information. Plan for next call:  completion of CT discharge phone call.     Latoya Quiroz RN BSN  Care Transition Nurse  [phone]

## 2022-11-10 ENCOUNTER — CARE COORDINATION (OUTPATIENT)
Dept: CASE MANAGEMENT | Age: 77
End: 2022-11-10

## 2022-11-10 NOTE — CARE COORDINATION
[facility] Care Transitions Initial Follow Up Call    Call within 2 business days of discharge: Yes    Care Transition Nurse contacted the patient by telephone to perform post hospital discharge assessment. Verified name and  with patient as identifiers. Provided introduction to self, and explanation of the Care Transition Nurse role. Patient: Deisy Valdez Patient : 1945   MRN: [id number]  Reason for Admission: LI  Discharge Date: 22 RARS: Readmission Risk Score: 20.3      Last Discharge  [address]       Date Complaint Diagnosis Description Type Department Provider    22 Shortness of Breath LI (dyspnea on exertion) . .. ED to Hosp-Admission (Discharged) (ADMITTED) Traci Schmidt MD; Yoana Salter. Was this an external facility discharge? No Discharge Facility: [facility] to be reviewed by the provider   Additional needs identified to be addressed with provider: No                 Method of communication with provider: none. 2nd attempt at completing CT discharge phone call. Kirk Crawley states she is doing okay. She states [address] was present this morning. Kirkkelton Crawley states her O2 sat = 98% on 3lpm.  She confirmed her appt with  tomorrow and her appt with  next week. She states her  will provide her transportation. She states she will reach out to  to schedule a hospital follow up. Kirk Crawley states she is up and about at her home on her own. She states she has a walk in tub. Kirk Denisa states her weight today = 215lbs, which is down from 216 yesterday. She denies any SOB, chest pain, or edema. Kirk Crawley states she follows a low sodium diet and monitors/restricts her fluids daily. Discussed the heart failure zones. Kirk Crawley states she is in the \"green\" zone today. She denies any needs at this time. CTNreviewed discharge instructions with patient who verbalized understanding.  The patient was given an opportunity to ask questions and does not have any further questions or concerns at this time. Were discharge instructions available to patient? Yes. Reviewed appropriate site of care based on symptoms and resources available to patient including: PCP  Specialist  Home health  When to call 911. The patient agrees to contact the PCP office for questions related to their healthcare. Advance Care Planning:   Advance Care Planning   Healthcare Decision Maker:    Primary Decision Maker: Donya Mcgrath Spouse - [phone]    Secondary Decision Maker: Art Mcfarlane - [phone]    Anuel Clark states she does have a Living Will and Healthcare POA. Medication reconciliation was performed with  no one. Anuel Clark states her  manages her medications. She states the [facility] nurse reviewed her medications this morning.   1111F entered: no    Was patient discharged with a pulse oximeter? no    Non-face-to-face services provided:  Obtained and reviewed discharge summary and/or continuity of care documents    Offered patient enrollment in the Remote Patient Monitoring (RPM) program for in-home monitoring: Patient is not eligible for RPM program.    Care Transitions 24 Hour Call    Do you have a copy of your discharge instructions?: Yes  Do you have all of your prescriptions and are they filled?: Yes  Have you been contacted by a [address] [place] [address] Avenue?: No  Have you scheduled your follow up appointment?: Yes  How are you going to get to your appointment?: Car - family or friend to transport  Do you have support at home?: Partner/Spouse/SO  Do you feel like you have everything you need to keep you well at home?: Yes  Care Transitions Interventions         Follow Up  Future Appointments   Date Time Provider Kiana Puri   11/11/2022 11:00 AM MD Andre Ortega [facility]   11/14/2022  1:00 PM Ariana Davidson DO [facility]   12/12/2022  2:00 PM CHRISTINE Boyd - CNP [address]   2/21/2023  1:00 PM Otilio Urbina Emilie Smith  [facility]       Care Transition Nurse provided contact information.     Plan for next call:  SOB - weight - O2 sat    Sarah Landis RN BSN  Care Transition Nurse  [phone]

## 2022-11-11 ENCOUNTER — OFFICE VISIT (OUTPATIENT)
Dept: CARDIOLOGY CLINIC | Age: 77
End: 2022-11-11
Payer: MEDICARE

## 2022-11-11 VITALS
BODY MASS INDEX: 38.07 KG/M2 | HEART RATE: 84 BPM | SYSTOLIC BLOOD PRESSURE: 134 MMHG | DIASTOLIC BLOOD PRESSURE: 72 MMHG | WEIGHT: 223 LBS | HEIGHT: 64 IN | OXYGEN SATURATION: 100 %

## 2022-11-11 DIAGNOSIS — I50.32 CHRONIC DIASTOLIC HEART FAILURE (HCC): Primary | ICD-10-CM

## 2022-11-11 DIAGNOSIS — D64.9 CHRONIC ANEMIA: ICD-10-CM

## 2022-11-11 DIAGNOSIS — I10 ESSENTIAL HYPERTENSION: ICD-10-CM

## 2022-11-11 DIAGNOSIS — J44.9 COPD, SEVERE (HCC): ICD-10-CM

## 2022-11-11 DIAGNOSIS — N18.9 ACUTE KIDNEY INJURY SUPERIMPOSED ON CKD (HCC): ICD-10-CM

## 2022-11-11 DIAGNOSIS — R01.1 SYSTOLIC MURMUR: ICD-10-CM

## 2022-11-11 DIAGNOSIS — Z79.01 CURRENT USE OF LONG TERM ANTICOAGULATION: ICD-10-CM

## 2022-11-11 DIAGNOSIS — N17.9 ACUTE KIDNEY INJURY SUPERIMPOSED ON CKD (HCC): ICD-10-CM

## 2022-11-11 DIAGNOSIS — I25.10 CORONARY ARTERY DISEASE INVOLVING NATIVE CORONARY ARTERY OF NATIVE HEART WITHOUT ANGINA PECTORIS: ICD-10-CM

## 2022-11-11 DIAGNOSIS — J96.10 CHRONIC RESPIRATORY FAILURE, UNSPECIFIED WHETHER WITH HYPOXIA OR HYPERCAPNIA (HCC): ICD-10-CM

## 2022-11-11 DIAGNOSIS — I48.19 PERSISTENT ATRIAL FIBRILLATION (HCC): ICD-10-CM

## 2022-11-11 PROCEDURE — 1036F TOBACCO NON-USER: CPT | Performed by: INTERNAL MEDICINE

## 2022-11-11 PROCEDURE — G8417 CALC BMI ABV UP PARAM F/U: HCPCS | Performed by: INTERNAL MEDICINE

## 2022-11-11 PROCEDURE — G8484 FLU IMMUNIZE NO ADMIN: HCPCS | Performed by: INTERNAL MEDICINE

## 2022-11-11 PROCEDURE — 1123F ACP DISCUSS/DSCN MKR DOCD: CPT | Performed by: INTERNAL MEDICINE

## 2022-11-11 PROCEDURE — G8427 DOCREV CUR MEDS BY ELIG CLIN: HCPCS | Performed by: INTERNAL MEDICINE

## 2022-11-11 PROCEDURE — 3074F SYST BP LT 130 MM HG: CPT | Performed by: INTERNAL MEDICINE

## 2022-11-11 PROCEDURE — 99214 OFFICE O/P EST MOD 30 MIN: CPT | Performed by: INTERNAL MEDICINE

## 2022-11-11 PROCEDURE — 1111F DSCHRG MED/CURRENT MED MERGE: CPT | Performed by: INTERNAL MEDICINE

## 2022-11-11 PROCEDURE — 1090F PRES/ABSN URINE INCON ASSESS: CPT | Performed by: INTERNAL MEDICINE

## 2022-11-11 PROCEDURE — 3023F SPIROM DOC REV: CPT | Performed by: INTERNAL MEDICINE

## 2022-11-11 PROCEDURE — G8399 PT W/DXA RESULTS DOCUMENT: HCPCS | Performed by: INTERNAL MEDICINE

## 2022-11-11 PROCEDURE — 3078F DIAST BP <80 MM HG: CPT | Performed by: INTERNAL MEDICINE

## 2022-11-11 NOTE — PROGRESS NOTES
[facility]  Office Visit Progress Note    Amy Malhotra  [birth year]    November 11, 2022    CC: \"I am feeling much better. \"     HPI:  The patient is 69 y.o. female with a past medical history significant for CAD, atrial fib, HTN, aortic aneurysm and COPD who is here for follow up. Admitted 1/7/2018-1/12/2018  With SOB and chest pain and dx with acute on chronic diastolic HF (diuresed), and atrial fib. Troponins elevated, testing pended due to sepsis from the flu. Outpatient stress testing positive and she underwent cardiac cath on 3/2/18 which revealed  of the RCA and nonobstructive CAD of the LAD and LCX. She also underwent endovascular AAA repair on 3/21/18 by Dr. Jorge Luis Corado. Patient was  in the hospital and had 2/25/20 for atrial fibrillation along with signs and symptoms of heart failure. She converted to sinus rhythm after she was started on amiodarone therapy. Continued management per Dr. Sherie Peacock. S/p PVI ablation 10/2020, she then had Afib ablation last year but had recurrence of A fib She underwent DCCV 1/12/21,  repeat AFIB ablation 2/17/21. continue Xarelto,  Toprol-XL. Flecainide discontinued 5/18/21 with plans for 30 day Event monitor at his f/u with Karla Jain CNP. Admitted 9-9-21 to 9-12-21 acute on chronic diastolic heart failure, pleural effusion and AFIB. DC weight 221#. She continues with off/on SOB per patient's report. Admitted 5/24-5/26/22 atrial fibrillation for dofetilide loading after having multiple ablations, cardioversions (last ablation 2/4/22 ,CV 4/2022) and failing flecainide therapy. She was started on 250mcg of dofetilide Q12H. Her QTc was monitored closely without any significant changes or need for dose adjustment. Electrolytes also monitored daily and replaced if needed. She did convert to sinus rhythm on 5/25/22 with improvement in symptoms. QTc remains acceptable today.  She was discharged on 250mcg of dofetilide Q12H with a BMP in 1 week. Presented to [facility] ED 7/8 with worsening SOB and admitted for acute on chronic diastolic heart failure. Started on IV lasix and diuresed. Developed MARK. Diuresed 228 lbs. DC weight total 4L    Readmitted 7/26/22-8/1/22 worsening SOB and cough prior 3 days, weakness admitted COPD exacerbation/PNA/MARK/acute on chronic dCHF. Prsents today in hospital follow up and to discuss candidacy for CardioMEMS heart failure device implant and ischemic workup. 8/5/22 office visit, She is on 2L per NC since discharge from the hospital. She notes SOB but denies any other cardiac symptoms. Abnormal Lexiscan followed by  [facility] on 9/20/22 per Dr. Adrian De León. Today she returns for HFU 11/2-11/8/22 presenting with sudden increased SOB. Chronic 4L oxygen therapy but her O2 levels were dropping into the 60%. She was admitted for acute on chronic hypoxic respiratory failure. One episodes of rapid atrial fibrillation, symptomatic, started on cardizem and converted. [address] 11/4/22 revealed she was in acute on chronic diastolic heart failure. NYHA class II, stage C. Her oxygen demand improved  and oxygen was weaned to 2-2.5L. Her uncontrolled blood pressure, and recurrent atrial fibrillation playing a role in her CHF recurrences  Today, she admits to feeling \"much better and breathing has improve but still SOB\" and is accompanied by her . She continues using a rolling seated walker, remains on oxygen therapy continuously per NC, currently 3-4L. She has been able to sleep nightly without any complaints. Patient denies exertional chest pain/pressure, dyspnea at rest, PND, orthopnea, palpitations, lightheadedness, weight changes, changes in LE edema, and syncope. Admits to medical therapy compliance and tolerating. Review of Systems:  Constitutional: Denies falls, night sweats or fever. Fatigue improved.    HEENT: Denies new visual changes, ringing in ears, nosebleeds, nasal congestion  Respiratory: + SOBE on oxygen therapy. Cardiovascular: see HPI  GI: Denies N/V, diarrhea, constipation, abdominal pain, change in bowel habits, melena or hematochezia  : Denies urinary frequency, urgency, incontinence, hematuria or dysuria. Skin: Denies rash, hives, or cyanosis  Musculoskeletal: Denies joint or muscle aches/pain  Neurological: Denies syncope or TIA-like symptoms. Psychiatric: Denies anxiety or depression, negative insomnia     Past Medical History:   Diagnosis Date    AAA (abdominal aortic aneurysm)     pt states it is 4cm    AAA (abdominal aortic aneurysm) without rupture 2/10/2015    Atrial fibrillation (HCC)     CAD (coronary artery disease)     CHF (congestive heart failure) (HCC)     COPD (chronic obstructive pulmonary disease) ([facility] [address]     History of blood clots     Hyperlipidemia     Hypertension      Past Surgical History:   Procedure Laterality Date    ABDOMINAL AORTIC ANEURYSM REPAIR      Endovascular abdominal AA    APPENDECTOMY      incidental    BLADDER SUSPENSION      CARDIAC SURGERY      CATARACT REMOVAL      CHOLECYSTECTOMY  10/15/13    COLONOSCOPY  07    dr Amadou Valero and check in 5 years. COLONOSCOPY  2017    ok dr arndt, repeat 5 years    HYSTERECTOMY ([facility])      for benign tumor.  just the uterus    JOINT REPLACEMENT  2013    right knee replacement    TONSILLECTOMY  as a child    TUMOR EXCISION      benign behind right ear about      Family History   Problem Relation Age of Onset    Heart Disease Father     Hypertension Other      Social History     Tobacco Use    Smoking status: Former     Packs/day: 1.00     Years: 37.00     Pack years: 37.00     Types: Cigarettes     Start date: 1976     Quit date: 2013     Years since quittin.1     Passive exposure: Past    Smokeless tobacco: Never    Tobacco comments:     E cigarette now and then   Vaping Use    Vaping Use: Former    Quit date: 2021    Substances: Nicotine   Substance Use Topics    Alcohol use: No    Drug use: No       Allergies   Allergen Reactions    Morphine Rash     rash     Current Outpatient Medications   Medication Sig Dispense Refill    isosorbide mononitrate (IMDUR) 60 MG extended release tablet Take 1 tablet by mouth Daily with lunch 30 tablet 0    dofetilide (TIKOSYN) 250 MCG capsule Take 1 capsule by mouth every 12 hours 60 capsule 0    hydrALAZINE (APRESOLINE) 50 MG tablet Take 1 tablet by mouth every 8 hours 90 tablet 0    dilTIAZem (CARDIZEM CD) 120 MG extended release capsule Take 1 capsule by mouth in the morning and at bedtime 60 capsule 0    furosemide (LASIX) 20 MG tablet Take 3 tablets by mouth in the morning and 3 tablets in the evening. 180 tablet 0    potassium chloride (KLOR-CON M) 20 MEQ extended release tablet Take 1 tablet by mouth 2 times daily (with meals) 60 tablet 0    albuterol (PROVENTIL) (2.5 MG/3ML) 0.083% nebulizer solution Take 2.5 mg by nebulization every 4 hours as needed for Wheezing      clobetasol (TEMOVATE) 0.05 % cream Apply topically 2 times daily as needed      rivaroxaban (XARELTO) 20 MG TABS tablet TAKE ONE TABLET BY MOUTH DAILY WITH BREAKFAST 30 tablet 11    clopidogrel (PLAVIX) 75 MG tablet Take 1 tablet by mouth daily 30 tablet 3    SYMBICORT 160-4.5 MCG/ACT AERO Inhale 2 puffs into the lungs in the morning and 2 puffs before bedtime. 1 each 11    vitamin C (ASCORBIC ACID) 500 MG tablet Take 500 mg by mouth daily      albuterol sulfate  (90 Base) MCG/ACT inhaler INHALE TWO PUFFS BY MOUTH EVERY 4 HOURS AS NEEDED FOR WHEEZING OR FOR SHORTNESS OF BREATH 1 each 3    rosuvastatin (CRESTOR) 20 MG tablet Take 1 tablet by mouth daily 90 tablet 2    Multiple Vitamins-Minerals (MULTI FOR HER 50+ PO) Take 1 tablet by mouth daily       No current facility-administered medications for this visit.        Physical Exam:   /72   Pulse 84   Ht 5' 4\" (1.626 m)   Wt 223 lb (101.2 kg)   SpO2 100%   BMI 38.28 kg/m²   Wt Readings from Last 2 Encounters: 22 223 lb (101.2 kg)   22 224 lb 13.9 oz (102 kg)     Physical Exam:   Constitutional: The patient is oriented to person, place, and time. Appears well-developed and well-nourished. In no acute distress. Head: Normocephalic and atraumatic. Pupils equal and round. Neck: Neck supple. No JVP or carotid bruit appreciated. No mass and no thyromegaly present. No lymphadenopathy present. Cardiovascular: Normal rate and regular rhythm. Normal heart sounds. Exam reveals no gallop and no friction rub. 7-0/8 systolic murmur at the base of her heart. Pulmonary/Chest: Effort normal.  No respiratory distress. - wheezes, no rhonchi, diminished breath sounds and no scattered crackles. Abdominal: Soft, non-tender. Bowel sounds are normal. Exhibits no organomegaly, mass or bruit. Extremities: no BLE edema. No cyanosis or clubbing. Pulses are 2+ radial and carotid bilaterally. Neurological: No gross cranial nerve deficit. Coordination normal.   Skin: Skin is warm and dry. There is no rash or diaphoresis. Psychiatric: Patient has a normal mood and affect.  Speech is normal and behavior is normal.     Lab Review:   Lab Results   Component Value Date/Time    TRIG 59 09/10/2021 07:22 AM    HDL 56 09/10/2021 07:22 AM    HDL 38 2011 03:35 PM    LDLCALC 39 09/10/2021 07:22 AM    LDLDIRECT 111 2012 11:32 AM    LABVLDL 12 09/10/2021 07:22 AM     Lab Results   Component Value Date/Time     2022 04:44 AM    K 4.0 2022 04:44 AM    K 3.8 2022 04:46 AM    CL 92 2022 04:44 AM    CO2 36 2022 04:44 AM    BUN 49 2022 04:44 AM    CREATININE 1.3 2022 04:44 AM    GLUCOSE 101 2022 04:44 AM    GLUCOSE 102 2016 01:03 PM    CALCIUM 9.3 2022 04:44 AM      Lab Results   Component Value Date    WBC 13.5 (H) 2022    HGB 9.6 (L) 2022    HCT 30.0 (L) 2022    MCV 88.3 2022     2022       EK2018: Sinus rhythm with old anterior infarct  5/1/18: Sinus Rhythm, PACs, Old anterior infarct. 2/6/19: Sinus Rhythm  8/4/20: Sinus  Rhythm  - occasional PAC, # PACs = 1, -Anteroseptal infarct -age undetermined.    -Nonspecific ST depression  -Nondiagnostic. 6/25/21: Sinus rhythm  -First degree A-V block , -Anteroseptal infarct -age undetermined. 6/24/22: SR   9//22: controlled atrial flutter  11/11/22: not completed     Echo 1/9/2018:  Mild concentric left ventricular hypertrophy. Visually estimated ejection fraction of 65%. Mitral annular calcification. Mild left atrial dilation. Mild aortic stenosis. (mean gradients [id number])  The systolic pulmonary artery pressure (SPAP) estimated at 30 mmHg  (estimated RA pressure of 8 mmHg included). [facility]: ([facility]) 4/2017   of RCA chronic LAD 10-20% RA 4 PA24/9 16 wedge 9 LVEDP markedly elevated 30    Carotid US 10/2017 at [facility]:  1. Estimated diameter reduction of the right internal carotid artery is  less than 50%. 2.  Estimated diameter reduction of the left internal carotid artery is  50-69%. 3.  The bilateral common carotid arteries reveal no evidence of   significant stenosis. 4.  There is greater than 50% stenosis of the right external carotid  artery. 5.  There is no evidence of significant stenosis in the left external  carotid artery. 6.  The bilateral vertebral arteries are patent with antegrade flow. 7.  The bilateral subclavian arteries reveal no evidence of significant  stenosis. 8.  There has been no significant change from the previous study of  4/21/2017. Lexiscan 2/19/18   Summary    Abnormal study. There is a moderate sized, mild intensity, partially    reversible defect of the mid to apical anterior and mid anterolateral walls    which is consistent with ischemia. There is breast attenuation but stress    images appear worse. Normal LV size and systolic function.     Overall findings represent an intermediate risk study     Cardiac Cath 3/5/18  OVERALL IMPRESSION  1. Again, 100% proximal right coronary artery occlusion with left to right  collaterals. 2.  Mild disease of the distal left main trunk. 3.  20% to 30% ostial left anterior descending artery stenosis with  collaterals from LAD to the right coronary artery. 4.  30% to 40% stenosis of the proximal circumflex artery. 5.  Normal left ventricular systolic function with estimated EF of 55% to  60%. 6.  Slightly enlarged aortic root with no evidence of aortic stenosis or  regurgitation. In view of the above findings, we will okay the patient for her upcoming  aortic aneurysm surgery from cardiac standpoint. ECHO 1/2/20  There is moderate concentric left ventricular hypertrophy. Patient appears to be in atrial fibrillation. Ejection fraction is estimated to be 50-60%. Indeterminate diastolic function. Mild aortic regurgitation. Mild tricuspid regurgitation. Mild mitral regurgitation with mitral annular calcification    Right Heart Cath 2/28/2020  1.  _____ normal right cardiac pressure. 2.  Elevated pulmonary capillary wedge pressure with a mean pulmonary  capillary wedge of 29 mmHg. 3.  Normal cardiac output and indices. 4.  No oxygen step off to suggest ASD/PFO. In view of the above findings, we will start the patient on a small dose  of diuretic therapy and see whether we need to adjust some of her  antihypertensive medicines or not. Her findings are consistent with a  diastolic heart failure. Stress test: 9/6/22      Abnormal myocardial perfusion study    Moderate sized moderate intensity mid to distal anterior, chad-lateral    reversible defect suggestive of ischemia    Reversible mid to base inferior wall defect    Possible suggestion of 2 V region of ischemia    Overall findings represent a intermediate-high risk study. Suggest [facility]     Cath: 9/20/22  OVERALL IMPRESSION:  1. Mildly elevated right heart pressures. 2.  Normal cardiac output and indices.   3.  No oxygen step-up to suggest ASD or PFO. 4.  Patent left main trunk with mild disease. 5.  Tortuous LAD with mild osteal disease, not hemodynamically  significant. 6.  75% stenosis of the proximal circumflex artery with some mild  disease of the obtuse marginal branch, not hemodynamically significant. 7.  Left-to-right collaterals. 8.  100% occlusion of mid RCA. 9.  Normal left ventricular systolic function with preserved EF of 65%  to 70%. Cath: 9/20/22 [place]    Findings:  Left Main  Distal non obstructive 30% disease, evaluated by IVUS MLA > 7.5 mm2   LAD  Moderate diffuse disease   Circ  Proximal vessel 75-80% stenosis correlating with positive functional study   Mid vessel non-obstructive disease 50%   RCA   with L-R collaterals         Interventions/Vessels  PCI performed to the proximal Lcx with DESx2   Resolute Mckay 3.5x22 mm   Resolute Mckay 3. 5x8 mm   Excellent post angiographic result with BALWINDER 3 flow   Guides/Wires  XB 3.5 guide catheter   Terumo RunThrough   Balanced Heavy Weight   Devices  Opticross boston IVUS   Post % Stenosis  0   Closure Device  Perclose R CFA   Complications  None      Conclusion:   Successful PCI of proximal Lcx with DESx2  Plavix loaded in cath lab  Femoral artery perclosed with excellent hemostasis  3H bedrest  Home this evening, follow up in 1-2 weeks    Echo: 11/3/22   Borderline conc. LVH with normal wall motion; EF 70%. Grade II diastolic dysfunction with elevated LH filling pressures. The left atrium is severely dilated. The right ventricle is normal in size and function. Mild mitral, aortic and tricuspid regurgitation.        [address] 11/4/22  Findings:  RA  5   RV  53/9   PA  49/20 (28)   PCWP  25   assisted  3.8    Conclusion: Post capillary pulmonary hypertension, Recommend continued diuresis     Assessment/Plan:       Chronic diastolic heart failure  -No aldactone secondary to elevated Cr.   -as of 9/29/22 she had been admitted x2 last 1 month to [facility]  -referred for consideration candidacy for CardioMEMS HF implant  -she needed out patient ischemic workup--9/6/22 abnormal  Lexiscan myoview followed by a   -[place] 9/20/22 s/p successful PC proximal Lcx with D#ES x2. LM 30% disease, LAD moderate diffuse dx, mid vessal 50% ,  with L-R collaterals of the RCA. -today she returns for HFU 11/2-11/8/22 presenting with sudden increased SOB. Chronic 4L oxygen therapy but her O2 levels were dropping into the 60%. She was admitted for acute on chronic hypoxic respiratory failure. One episodes of rapid atrial fibrillation, symptomatic. [facility] 11/4/22 revealed she was in acute on chronic diastolic heart failure. NYHA class II, stage C. Her oxygen demand improved  and oxygen was weaned to 2-2.5L. Her uncontrolled blood pressure, and recurrent atrial fibrillation playing a role in her CHF recurrences    -Today, she admits to feeling \"much better as well as breathing. \"   -continues with LI  -Currently on isosorbide, tikosyn, diltiazem,  hydralazine.  -On Lasix.   -We will consider SGLT2 inhibitor therapy in outpatient setting.  -Encouraged medication compliance, low salt diet with hx of indiscretions, compression stockings and elevating legs. -BMP, BNP, CBC prior to next follow up.   -CardioMEMS HF Implant ready to proceed. We again address CardioMEMS therapy with the patient during this visit and will get her scheduled. NYHA class 3. ACC/AHA stage 3  Coronary artery disease involving native coronary artery of native heart without angina pectoris  -She previously reported myalgias with Crestor with resolution of cramping after discontinuing.    -[place] 9/20/22 s/p PCI of proximal Lcx with DESx2 by Dr. Klever Urbina  -[facility] 11/4/22 acute on chronic diastolic heart failure (with acute on chronic respiratory failure)  -also episode of symptomatic AFIB during hospitalization.     -denies any angina today, continues with chronic improved LI>   -continues to wear 3-4L of oxygen   -remain on triple therapy; Plavix, aspirin, rosuvastatin  -will have her stop aspirin in 1 month (10/20/22) due to reports of epistaxis    Essential hypertension  -Blood pressure is stable today on medical therapy. Encouraged on low salt diet. Persistent atrial fibrillation   -CHADS VASC score 7 for age, gender, DVT, CHF, HTN, CAD.   -Managed per Dr. Gaby Mercado. S/p PVI ablation 10/2020, she then had Afib ablation last year but had recurrence of A fib She underwent DCCV 1/12/21,  repeat AFIB ablation 2/17/21, DCCV 4/2022.   -5/24/22 admitted for dofetilide loading after having multiple ablations, cardioversions and failing flecainide therapy. She was started on 250mcg of dofetilide Q12H. Her QTc was monitored closely without any significant changes or need for dose adjustment. Electrolytes also monitored daily and replaced if needed. She did convert to sinus rhythm on 5/25/22 with improvement in symptoms. QTc remains acceptable today. She will be discharged on 250mcg of dofetilide Q12H with a BMP in 1 week.     -11/2022 admitted for acute on chronic respiratory failure, acute on chronic diastolic heart failure went into symptomatic rapid atrial fibrillation. -improvement in overall feeling, LI, sleeping nightly  -remains oxygen therapy per NC 3-4L   -Xarel for [address] [place] [address]. -normal rate and rhythm per physical exam.    Systolic murmur  Echo reviewed 11/2/22. Severe COPD/Asthma/chronic respiratory failure  managed per Dr. Liliane Leahy. Keep f/u with pulmonology. AAA (abdominal aortic aneurysm) without rupture   -Underwent endovascular AAA repair on 3/21/18 by Dr. Sana Sanchez. Follows vascular surgery    Sleep apnea  -Intolerant to CPAP. Anemia with hx of Epistaxis  -(Plavix and Xarelto) for CAD with recent stent placement and Afib.   -may stop aspirin on 10/20/22. -CBC with next blood draw. Instructed to obtain flu vaccine this season, annually and obtain COVID-19 vaccine per guidelines.         We will schedule her for CardioMEMs and plan for her to return to the office in in 4-5 weeks. Thank you very much for allowing me to participate in the care of your patient. Please do not hesitate to contact me if you have any questions. Sincerely,  Jose Hoffmann MD      [place] [address]  Ph: [phone]  Fax: [phone]    This note was scribed in the presence of Dr. Waqas Arce MD by Lis Segovia RN.

## 2022-11-11 NOTE — TELEPHONE ENCOUNTER
Dr. Bobby Palacios and I saw in office today. She was accompanied by her . Will work on scheduling and contact them.

## 2022-11-11 NOTE — PATIENT INSTRUCTIONS
CardioMEMS procedure with Dr. Dolores De Leon at [facility]   The morning of your procedure you will park in the hospital parking lot and report directly to the cath lab to check in. [address] [id number]    DATE:     TIME:     ARRIVAL TIME:     Pre-Procedure Instructions: You will need to fast for at least 8 hours prior to procedure. No caffeine the morning of. No gum or mints, if having general anesthesia. You will need to hold your anticoagulation, Xarelto for 48 hours days prior. Hold your diuretic, Lasix the morning of procedure. If an afternoon procedure, you may take your morning dose of Lasix at least 6 hours prior. All other medications can be taken in the morning with sips of water-including Plavix. Do not use any lotions, creams or perfume the morning of procedure. Please have a responsible adult to drive you home after procedure. We advise you have someone to stay with you for 24 hours following procedure for precautionary measures. Depending on procedure you may require an overnight stay. Cath lab will provide you with all post procedure instructions. If you have any questions regarding the procedure itself or medications, please call [phone] and ask to speak with a nurse.   Alfonso Turcios

## 2022-11-14 ENCOUNTER — OFFICE VISIT (OUTPATIENT)
Dept: PULMONOLOGY | Age: 77
End: 2022-11-14
Payer: MEDICARE

## 2022-11-14 VITALS
WEIGHT: 222 LBS | SYSTOLIC BLOOD PRESSURE: 125 MMHG | RESPIRATION RATE: 21 BRPM | BODY MASS INDEX: 37.9 KG/M2 | HEIGHT: 64 IN | TEMPERATURE: 96.9 F | DIASTOLIC BLOOD PRESSURE: 80 MMHG | HEART RATE: 82 BPM | OXYGEN SATURATION: 95 %

## 2022-11-14 DIAGNOSIS — J96.11 CHRONIC RESPIRATORY FAILURE WITH HYPOXIA (HCC): ICD-10-CM

## 2022-11-14 DIAGNOSIS — J44.9 COPD, SEVERE (HCC): Primary | ICD-10-CM

## 2022-11-14 DIAGNOSIS — I50.32 CHRONIC DIASTOLIC CHF (CONGESTIVE HEART FAILURE) (HCC): ICD-10-CM

## 2022-11-14 PROCEDURE — 99214 OFFICE O/P EST MOD 30 MIN: CPT | Performed by: INTERNAL MEDICINE

## 2022-11-14 PROCEDURE — 3023F SPIROM DOC REV: CPT | Performed by: INTERNAL MEDICINE

## 2022-11-14 PROCEDURE — 3078F DIAST BP <80 MM HG: CPT | Performed by: INTERNAL MEDICINE

## 2022-11-14 PROCEDURE — G8399 PT W/DXA RESULTS DOCUMENT: HCPCS | Performed by: INTERNAL MEDICINE

## 2022-11-14 PROCEDURE — 1090F PRES/ABSN URINE INCON ASSESS: CPT | Performed by: INTERNAL MEDICINE

## 2022-11-14 PROCEDURE — 1111F DSCHRG MED/CURRENT MED MERGE: CPT | Performed by: INTERNAL MEDICINE

## 2022-11-14 PROCEDURE — G8484 FLU IMMUNIZE NO ADMIN: HCPCS | Performed by: INTERNAL MEDICINE

## 2022-11-14 PROCEDURE — 1123F ACP DISCUSS/DSCN MKR DOCD: CPT | Performed by: INTERNAL MEDICINE

## 2022-11-14 PROCEDURE — 1036F TOBACCO NON-USER: CPT | Performed by: INTERNAL MEDICINE

## 2022-11-14 PROCEDURE — G8427 DOCREV CUR MEDS BY ELIG CLIN: HCPCS | Performed by: INTERNAL MEDICINE

## 2022-11-14 PROCEDURE — 3074F SYST BP LT 130 MM HG: CPT | Performed by: INTERNAL MEDICINE

## 2022-11-14 PROCEDURE — G8417 CALC BMI ABV UP PARAM F/U: HCPCS | Performed by: INTERNAL MEDICINE

## 2022-11-14 ASSESSMENT — COPD QUESTIONNAIRES
QUESTION3_CHESTTIGHTNESS: 3
QUESTION6_LEAVINGHOUSE: 3
QUESTION7_SLEEPQUALITY: 3
QUESTION8_ENERGYLEVEL: 4
CAT_TOTALSCORE: 25
QUESTION4_WALKINCLINE: 4
QUESTION1_COUGHFREQUENCY: 0
QUESTION5_HOMEACTIVITIES: 4
QUESTION2_CHESTPHLEGM: 4

## 2022-11-14 NOTE — PROGRESS NOTES
Chief complaint  This is a 69y.o. year old female  who comes to see me with a chief complaint of   Chief Complaint   Patient presents with    Follow-up    COPD         HPI  Here with CC of COPD    Just was admitted about 2 weeks ago for respiratory failure and SOB. Was up to 4 liters of oxygen. Initially was treated for COPD and CHF but RHC was performed and her wedge was 25. She was then aggressively diuresed and it helped. Oxygen down to 2 liters and breathing improved. Has been losing weight since discharge. Better. On symbicort, spiriva and nebs. Uses oxygen at 2-2.5 liters at this time. Probably was drinking more water than she should have been. She is being more strict now. Much better     Current Outpatient Medications   Medication Sig Dispense Refill    isosorbide mononitrate (IMDUR) 60 MG extended release tablet Take 1 tablet by mouth Daily with lunch 30 tablet 0    dofetilide (TIKOSYN) 250 MCG capsule Take 1 capsule by mouth every 12 hours 60 capsule 0    hydrALAZINE (APRESOLINE) 50 MG tablet Take 1 tablet by mouth every 8 hours 90 tablet 0    dilTIAZem (CARDIZEM CD) 120 MG extended release capsule Take 1 capsule by mouth in the morning and at bedtime 60 capsule 0    furosemide (LASIX) 20 MG tablet Take 3 tablets by mouth in the morning and 3 tablets in the evening. 180 tablet 0    potassium chloride (KLOR-CON M) 20 MEQ extended release tablet Take 1 tablet by mouth 2 times daily (with meals) 60 tablet 0    albuterol (PROVENTIL) (2.5 MG/3ML) 0.083% nebulizer solution Take 2.5 mg by nebulization every 4 hours as needed for Wheezing      clobetasol (TEMOVATE) 0.05 % cream Apply topically 2 times daily as needed      rivaroxaban (XARELTO) 20 MG TABS tablet TAKE ONE TABLET BY MOUTH DAILY WITH BREAKFAST 30 tablet 11    clopidogrel (PLAVIX) 75 MG tablet Take 1 tablet by mouth daily 30 tablet 3    SYMBICORT 160-4.5 MCG/ACT AERO Inhale 2 puffs into the lungs in the morning and 2 puffs before bedtime.  1 each 11    vitamin C (ASCORBIC ACID) 500 MG tablet Take 500 mg by mouth daily      albuterol sulfate  (90 Base) MCG/ACT inhaler INHALE TWO PUFFS BY MOUTH EVERY 4 HOURS AS NEEDED FOR WHEEZING OR FOR SHORTNESS OF BREATH 1 each 3    rosuvastatin (CRESTOR) 20 MG tablet Take 1 tablet by mouth daily 90 tablet 2    Multiple Vitamins-Minerals (MULTI FOR HER 50+ PO) Take 1 tablet by mouth daily       No current facility-administered medications for this visit. PHYSICAL EXAM:  Vitals:    11/14/22 1302   BP: 125/80   Pulse: 82   Resp: 21   Temp: 96.9 °F (36.1 °C)   SpO2: 95%           PE  GENERAL:  Well nourished, alert  HEENT:  No scleral icterus, no conjunctival irritation  NECK:  No thyromegaly, no bruits  LYMPH:  No cervical or supraclavicular adenopathy  HEART:  Regular rate and rhythm, no murmurs. Distant heart sounds   LUNGS:  Diminished, no wheezing or rhonchi   ABDOMEN:  No distention, no organomegaly  EXTREMITIES: + edema, no digital clubbing  NEURO:  No localizing deficits, CN II-XII intact    Pulmonary Function Testing 7/2015  Severe obstruction with no response to bronchodilators  Moderate Restriction  Moderate Reduction in diffusing capacity     Chest imaging from 9/22 is reviewed. My impression is suspicion of emphysema, scarring and small nodules    6 MWT 9/2020  The patient ambulated 122 meters which is abnormal- 34-%   predicted. Her, heart rate response was normal, the blood   pressure response was normal, oxygen saturations were normal.     The patient desaturated to 93% while ambulating on room air. The degree of symptoms based on the Danielle Dyspnea/Fatigue scale   were increased with testing. This test does not indicate the   need for supplemental oxygen. I reviewed the above labs and images    ABG 6/22:  7.45/45/72     RHC 11/22  RA  5   RV  53/9   PA  49/20 (28)   PCWP  25   halfway  3.8         Assessment/Plan:  1.  COPD, severe ([address]  Seems to be ok and her recent admission was more CHF than COPD. Continue with symbicort, spiriva and nebs as needed    2. Chronic respiratory failure with hypoxia (HCC)  Uses 2-4 liters of oxygen with exertion and sleep. When in decompensated CHF she needed 4 liters but now down to 2-2.5. Have to watch her oxygen requirements when she flares up as I suspect she has had more heart issues than COPD issues in the past 12 monhts    3. Chronic diastolic CHF (congestive heart failure) (HCC)  Last RHC showed a wedge of 25. Cardiology managing diuretics and education. Probably a good idea to do pulm rehab after the 1st of the year.       Pulmonary Rehab:  Strongly consider after the holidays     Lung Cancer Screening CT:  9/2022    Has follow up in 3 months

## 2022-11-16 ENCOUNTER — TELEPHONE (OUTPATIENT)
Dept: CARDIOLOGY CLINIC | Age: 77
End: 2022-11-16

## 2022-11-16 ENCOUNTER — CARE COORDINATION (OUTPATIENT)
Dept: CASE MANAGEMENT | Age: 77
End: 2022-11-16

## 2022-11-16 NOTE — CARE COORDINATION
[facility] Care Transitions Follow Up Call    LPN Care Coordinator contacted the patient by telephone to follow up after admission on -. Verified name and  with patient as identifiers. Patient: Abby Lei  Patient : 1945   MRN: [id number]  Reason for Admission: COPD exacerbation, CHF, CAD, afib, NSTEMI, HTN urgency, debility  Discharge Date: 22 RARS: Readmission Risk Score: 20.3      Needs to be reviewed by the provider   Additional needs identified to be addressed with provider: No  none             Method of communication with provider: none. LPN CC spoke with patient. Call was brief. States SOB comes and goes, is about the same. Weight 218. SPO2 in the 90s. F/u went well. Briefly discussed medications. Denies needs. Shruthi Rodriguez LPN [address] [place] [address]  Care Transitions  [phone]    Addressed changes since last contact:  none  Discussed follow-up appointments. If no appointment was previously scheduled, appointment scheduling offered: Yes. Is follow up appointment scheduled within 7 days of discharge? Yes. Follow Up  Future Appointments   Date Time Provider Kiana Puri   2022  4:20 PM Rosio Brewer MD [facility]   2022  1:00 PM MD Andre Ernst [facility]   2022  2:00 PM CHRISTINE Stewart CNP [address]   2023  1:00 PM Jonna Nguyễn DO [facility]     [id number] Kiana Gayle follow up appointment(s): KATHY    N Care Coordinator reviewed medical action plan and red flags with patient and discussed any barriers to care and/or understanding of plan of care after discharge. Discussed appropriate site of care based on symptoms and resources available to patient including: PCP  Specialist  Home health  When to call 911. The patient agrees to contact the PCP office for questions related to their healthcare. Advance Care Planning:   reviewed and current.      Patients top risk factors for readmission: medical condition-COPD, CHF, afib, HTN  Interventions to address risk factors: Assessment and support for treatment adherence and medication management-reviewed medications    Offered patient enrollment in the Remote Patient Monitoring (RPM) program for in-home monitoring: Yes, but did not enroll at this time. Care Transitions Subsequent and Final Call    Subsequent and Final Calls  Care Transitions Interventions  Other Interventions:             LPN Care Coordinator provided contact information for future needs. Plan for follow-up call in 7-10 days based on severity of symptoms and risk factors.   Plan for next call: symptom management-SOB, cough, edema, CP, HA, dizziness  self management-weight, SPO2, BP  follow-up appointment-PCP 11/22, cardio 12/9  medication management-new or changed  community Mumtaz Palumbo, HUGO

## 2022-11-16 NOTE — TELEPHONE ENCOUNTER
CardioMEMS procedure with Dr. Dolores De Leon at [facility] [place]    The morning of your procedure you will park in the hospital parking lot and report directly to the cath lab to check in. [address] [place] [address] Drive. [id number]      DATE: Monday 11/28/22     TIME: 10:30 a.m. ARRIVAL TIME: 9:00 a.m. Pre-Procedure Instructions: You will need to fast for at least 8 hours prior to procedure. No caffeine the morning of. No gum or mints, if having general anesthesia. You will need to hold your anticoagulation, Xarelto for 48 hours days prior. Hold your diuretic, Lasix the morning of procedure. If an afternoon procedure, you may take your morning dose of Lasix at least 6 hours prior. All other medications can be taken in the morning with sips of water-including Plavix. Do not use any lotions, creams or perfume the morning of procedure. Please have a responsible adult to drive you home after procedure. We advise you have someone to stay with you for 24 hours following procedure for precautionary measures. Depending on procedure you may require an overnight stay. Cath lab will provide you with all post procedure instructions. If you have any questions regarding the procedure itself or medications, please call [phone] and ask to speak with a nurse. Tiffanie Wright, RN       [place], CNP available rounding at [facility] patient and wife Linda. They are agreeable to date/times. They have AVS given at office visit. Read back dates/times correctly. Published on [employer] and e-mail to [place].  Email update sent to team.

## 2022-11-18 NOTE — TELEPHONE ENCOUNTER
New tele started as the patient is now scheduled for CardioMEMS HF Implant on Monday 11/28/22. Arrival 0900, start time 1030.  [facility].

## 2022-11-22 ENCOUNTER — OFFICE VISIT (OUTPATIENT)
Dept: FAMILY MEDICINE CLINIC | Age: 77
End: 2022-11-22
Payer: MEDICARE

## 2022-11-22 ENCOUNTER — TELEPHONE (OUTPATIENT)
Dept: FAMILY MEDICINE CLINIC | Age: 77
End: 2022-11-22

## 2022-11-22 VITALS
DIASTOLIC BLOOD PRESSURE: 64 MMHG | HEIGHT: 64 IN | TEMPERATURE: 97 F | BODY MASS INDEX: 38.24 KG/M2 | SYSTOLIC BLOOD PRESSURE: 128 MMHG | WEIGHT: 224 LBS

## 2022-11-22 DIAGNOSIS — N18.9 ACUTE KIDNEY INJURY SUPERIMPOSED ON CKD (HCC): ICD-10-CM

## 2022-11-22 DIAGNOSIS — Z79.01 CURRENT USE OF LONG TERM ANTICOAGULATION: ICD-10-CM

## 2022-11-22 DIAGNOSIS — Z09 HOSPITAL DISCHARGE FOLLOW-UP: ICD-10-CM

## 2022-11-22 DIAGNOSIS — D64.9 CHRONIC ANEMIA: ICD-10-CM

## 2022-11-22 DIAGNOSIS — I25.10 CORONARY ARTERY DISEASE INVOLVING NATIVE CORONARY ARTERY OF NATIVE HEART WITHOUT ANGINA PECTORIS: ICD-10-CM

## 2022-11-22 DIAGNOSIS — Z23 NEEDS FLU SHOT: ICD-10-CM

## 2022-11-22 DIAGNOSIS — I50.32 CHRONIC DIASTOLIC CONGESTIVE HEART FAILURE (HCC): ICD-10-CM

## 2022-11-22 DIAGNOSIS — N17.9 ACUTE KIDNEY INJURY SUPERIMPOSED ON CKD (HCC): ICD-10-CM

## 2022-11-22 DIAGNOSIS — J96.11 CHRONIC RESPIRATORY FAILURE WITH HYPOXIA (HCC): Primary | ICD-10-CM

## 2022-11-22 DIAGNOSIS — I50.32 CHRONIC DIASTOLIC HEART FAILURE (HCC): ICD-10-CM

## 2022-11-22 DIAGNOSIS — I10 PRIMARY HYPERTENSION: Primary | ICD-10-CM

## 2022-11-22 DIAGNOSIS — I10 ESSENTIAL HYPERTENSION: ICD-10-CM

## 2022-11-22 LAB
HCT VFR BLD CALC: 29.8 % (ref 36–48)
HEMOGLOBIN: 9.5 G/DL (ref 12–16)
MCH RBC QN AUTO: 28.5 PG (ref 26–34)
MCHC RBC AUTO-ENTMCNC: 31.8 G/DL (ref 31–36)
MCV RBC AUTO: 89.7 FL (ref 80–100)
PDW BLD-RTO: 16.1 % (ref 12.4–15.4)
PLATELET # BLD: 218 K/UL (ref 135–450)
PMV BLD AUTO: 9.7 FL (ref 5–10.5)
RBC # BLD: 3.32 M/UL (ref 4–5.2)
WBC # BLD: 10.8 K/UL (ref 4–11)

## 2022-11-22 PROCEDURE — 1036F TOBACCO NON-USER: CPT | Performed by: FAMILY MEDICINE

## 2022-11-22 PROCEDURE — 3078F DIAST BP <80 MM HG: CPT | Performed by: FAMILY MEDICINE

## 2022-11-22 PROCEDURE — G8399 PT W/DXA RESULTS DOCUMENT: HCPCS | Performed by: FAMILY MEDICINE

## 2022-11-22 PROCEDURE — 1123F ACP DISCUSS/DSCN MKR DOCD: CPT | Performed by: FAMILY MEDICINE

## 2022-11-22 PROCEDURE — 1090F PRES/ABSN URINE INCON ASSESS: CPT | Performed by: FAMILY MEDICINE

## 2022-11-22 PROCEDURE — G8427 DOCREV CUR MEDS BY ELIG CLIN: HCPCS | Performed by: FAMILY MEDICINE

## 2022-11-22 PROCEDURE — G8417 CALC BMI ABV UP PARAM F/U: HCPCS | Performed by: FAMILY MEDICINE

## 2022-11-22 PROCEDURE — 3074F SYST BP LT 130 MM HG: CPT | Performed by: FAMILY MEDICINE

## 2022-11-22 PROCEDURE — 99213 OFFICE O/P EST LOW 20 MIN: CPT | Performed by: FAMILY MEDICINE

## 2022-11-22 PROCEDURE — G8484 FLU IMMUNIZE NO ADMIN: HCPCS | Performed by: FAMILY MEDICINE

## 2022-11-22 PROCEDURE — 1111F DSCHRG MED/CURRENT MED MERGE: CPT | Performed by: FAMILY MEDICINE

## 2022-11-22 PROCEDURE — 90694 VACC AIIV4 NO PRSRV 0.5ML IM: CPT | Performed by: FAMILY MEDICINE

## 2022-11-22 PROCEDURE — G0180 MD CERTIFICATION HHA PATIENT: HCPCS | Performed by: FAMILY MEDICINE

## 2022-11-22 PROCEDURE — G0008 ADMIN INFLUENZA VIRUS VAC: HCPCS | Performed by: FAMILY MEDICINE

## 2022-11-22 ASSESSMENT — PATIENT HEALTH QUESTIONNAIRE - PHQ9
1. LITTLE INTEREST OR PLEASURE IN DOING THINGS: 0
SUM OF ALL RESPONSES TO PHQ QUESTIONS 1-9: 0
SUM OF ALL RESPONSES TO PHQ QUESTIONS 1-9: 0
SUM OF ALL RESPONSES TO PHQ9 QUESTIONS 1 & 2: 0
SUM OF ALL RESPONSES TO PHQ QUESTIONS 1-9: 0
SUM OF ALL RESPONSES TO PHQ QUESTIONS 1-9: 0
2. FEELING DOWN, DEPRESSED OR HOPELESS: 0

## 2022-11-22 NOTE — PATIENT INSTRUCTIONS
Do remember to get the blood tests that cardiology ordered   Continue the medicines  See in about 5 months

## 2022-11-22 NOTE — PROGRESS NOTES
Vaccine Information Sheet, \"Influenza - Inactivated\"  given to Ponce Segal, or parent/legal guardian of  Ponce Segal and verbalized understanding. Patient responses:    Have you ever had a reaction to a flu vaccine? No  Do you have any current illness? No  Have you ever had Guillian Webber Syndrome? No  Do you have a serious allergy to any of the follow: Neomycin, Polymyxin, Thimerosal, eggs or egg products? No    Flu vaccine given per order. Please see immunization tab. Risks and benefits explained. Current VIS given.

## 2022-11-22 NOTE — PROGRESS NOTES
Subjective:      Patient ID: Adeline Martinez is a 69 y.o. female. Chief Complaint   Patient presents with    Follow-Up from [facility] 11-2-2022 \"LI\"        Patient presents with: Follow-Up from Hospital: [facility] 11-2-2022 \"LI\"    Here for the above  She is on O2 3 L   She is still with some sob  No fever no cp no   Appetite is fine  No urine pb   Bm regular no blood  No abd pain   Overall sleep ok     Here with her      YOB: 1945    Date of Visit:  11/22/2022     -- Morphine -- Rash    --  rash    Current Outpatient Medications:  isosorbide mononitrate (IMDUR) 60 MG extended release tablet, Take 1 tablet by mouth Daily with lunch, Disp: 30 tablet, Rfl: 0  dofetilide (TIKOSYN) 250 MCG capsule, Take 1 capsule by mouth every 12 hours, Disp: 60 capsule, Rfl: 0  hydrALAZINE (APRESOLINE) 50 MG tablet, Take 1 tablet by mouth every 8 hours, Disp: 90 tablet, Rfl: 0  dilTIAZem (CARDIZEM CD) 120 MG extended release capsule, Take 1 capsule by mouth in the morning and at bedtime, Disp: 60 capsule, Rfl: 0  furosemide (LASIX) 20 MG tablet, Take 3 tablets by mouth in the morning and 3 tablets in the evening., Disp: 180 tablet, Rfl: 0  potassium chloride (KLOR-CON M) 20 MEQ extended release tablet, Take 1 tablet by mouth 2 times daily (with meals), Disp: 60 tablet, Rfl: 0  albuterol (PROVENTIL) (2.5 MG/3ML) 0.083% nebulizer solution, Take 2.5 mg by nebulization every 4 hours as needed for Wheezing, Disp: , Rfl:   clobetasol (TEMOVATE) 0.05 % cream, Apply topically 2 times daily as needed, Disp: , Rfl:   rivaroxaban (XARELTO) 20 MG TABS tablet, TAKE ONE TABLET BY MOUTH DAILY WITH BREAKFAST, Disp: 30 tablet, Rfl: 11  clopidogrel (PLAVIX) 75 MG tablet, Take 1 tablet by mouth daily, Disp: 30 tablet, Rfl: 3  SYMBICORT 160-4.5 MCG/ACT AERO, Inhale 2 puffs into the lungs in the morning and 2 puffs before bedtime. , Disp: 1 each, Rfl: 11  vitamin C (ASCORBIC ACID) 500 MG tablet, Take 500 mg by mouth daily, Disp: , Rfl:   albuterol sulfate  (90 Base) MCG/ACT inhaler, INHALE TWO PUFFS BY MOUTH EVERY 4 HOURS AS NEEDED FOR WHEEZING OR FOR SHORTNESS OF BREATH, Disp: 1 each, Rfl: 3  rosuvastatin (CRESTOR) 20 MG tablet, Take 1 tablet by mouth daily, Disp: 90 tablet, Rfl: 2  Multiple Vitamins-Minerals (MULTI FOR HER 50+ PO), Take 1 tablet by mouth daily, Disp: , Rfl:     No current facility-administered medications for this visit.      ---------------------------               11/22/22                      1614         ---------------------------   BP:          128/64         Site:    Left Upper Arm     Position:     Sitting        Cuff Size:   Large Adult      Temp:    97 °F (36.1 °C)    TempSrc:    Temporal        Weight: 224 lb (101.6 kg)   Height:  5' 4\" (1.626 m)   ---------------------------  Body mass index is 38.45 kg/m². Wt Readings from Last 3 Encounters:  11/22/22 : 224 lb (101.6 kg)  11/14/22 : 222 lb (100.7 kg)  11/11/22 : 223 lb (101.2 kg)    BP Readings from Last 3 Encounters:  11/22/22 : 128/64  11/14/22 : 125/80  11/11/22 : 134/72          Review of Systems    Objective:   Physical Exam  Constitutional:       General: She is not in acute distress. Appearance: Normal appearance. She is well-developed. She is not diaphoretic. Cardiovascular:      Rate and Rhythm: Normal rate and regular rhythm. Heart sounds: Normal heart sounds. No murmur heard. No friction rub. No gallop. Comments: No edema on the lower legs  Pulmonary:      Effort: Pulmonary effort is normal. No tachypnea, accessory muscle usage or respiratory distress. Breath sounds: Normal breath sounds. No decreased breath sounds, wheezing, rhonchi or rales. Comments: Clear but distant air sounds /copd  Lymphadenopathy:      Cervical: No cervical adenopathy. Upper Body:      Right upper body: No supraclavicular adenopathy.       Left upper body: No supraclavicular adenopathy. Skin:     General: Skin is warm and dry. Coloration: Skin is not pale. Neurological:      Mental Status: She is alert. Assessment:       Diagnosis Orders   1. Chronic respiratory failure with hypoxia (HCC)        2. Chronic diastolic congestive heart failure ([address]        3. Hospital discharge follow-up        4.  Needs flu shot  Influenza, FLUAD, (age 72 y+), IM, PF, 0.5 mL          Stable at this time  She remains on O2  No change in treatment   She was in hospital for acute on chronic diastolic failure and cad  Bmp noted on 11/8/22 creat 1.3. cbc hg 9.6      Plan:      Do remember to get the blood tests that cardiology ordered   Continue the medicines  See in about 5 months         Judi Gamez MD

## 2022-11-22 NOTE — TELEPHONE ENCOUNTER
[facility] AT [facility] FORMS ARE COMPLETED

## 2022-11-23 ENCOUNTER — CARE COORDINATION (OUTPATIENT)
Dept: CASE MANAGEMENT | Age: 77
End: 2022-11-23

## 2022-11-23 DIAGNOSIS — N18.9 ACUTE KIDNEY INJURY SUPERIMPOSED ON CKD (HCC): ICD-10-CM

## 2022-11-23 DIAGNOSIS — I50.32 CHRONIC DIASTOLIC HEART FAILURE (HCC): Primary | ICD-10-CM

## 2022-11-23 DIAGNOSIS — N17.9 ACUTE KIDNEY INJURY SUPERIMPOSED ON CKD (HCC): ICD-10-CM

## 2022-11-23 LAB
ANION GAP SERPL CALCULATED.3IONS-SCNC: 13 MMOL/L (ref 3–16)
BUN BLDV-MCNC: 29 MG/DL (ref 7–20)
CALCIUM SERPL-MCNC: 9.5 MG/DL (ref 8.3–10.6)
CHLORIDE BLD-SCNC: 90 MMOL/L (ref 99–110)
CO2: 35 MMOL/L (ref 21–32)
CREAT SERPL-MCNC: 1.6 MG/DL (ref 0.6–1.2)
GFR SERPL CREATININE-BSD FRML MDRD: 33 ML/MIN/{1.73_M2}
GLUCOSE BLD-MCNC: 113 MG/DL (ref 70–99)
POTASSIUM SERPL-SCNC: 4.3 MMOL/L (ref 3.5–5.1)
PRO-BNP: 1446 PG/ML (ref 0–449)
SODIUM BLD-SCNC: 138 MMOL/L (ref 136–145)

## 2022-11-23 RX ORDER — FUROSEMIDE 20 MG/1
40 TABLET ORAL 2 TIMES DAILY
Qty: 120 TABLET | Refills: 0
Start: 2022-11-23 | End: 2022-12-01

## 2022-11-23 NOTE — CARE COORDINATION
[facility] Care Transitions Follow Up Call    Care Transition Nurse contacted the patient by telephone. Verified name and  with patient as identifiers. Patient: Maria Antonia Mo  Patient : 1945   MRN: [id number]  Reason for Admission: COPD exacerbation  Discharge Date: 22 RARS: Readmission Risk Score: 20.3      Needs to be reviewed by the provider   Additional needs identified to be addressed with provider: No  none             Method of communication with provider: none. Pt states doing very well, baseline and knows has cath Monday. Denies needs. Pt states HHC finished. Addressed changes since last contact:  none  Discussed follow-up appointments. If no appointment was previously scheduled, appointment scheduling offered: No.   Is follow up appointment scheduled within 7 days of discharge? Yes. Follow Up  Future Appointments   Date Time Provider Kiana Puri   2022 10:30 AM WS CARDIAC CATH LAB  [facility] CATH [facility]   2022  1:00 PM Dejuan Schilling MD [facility]   2022  2:00 PM CHRISTINE Corbett - CNP [facility]   2023  1:00 PM Mavis Beck DO [facility]   2023  1:20 PM Papa Ardon MD [address] Neal Transition Nurse reviewed discharge instructions and red flags with patient and discussed any barriers to care and/or understanding of plan of care after discharge. Discussed appropriate site of care based on symptoms and resources available to patient including: PCP  Specialist. The patient agrees to contact the PCP office for questions related to their healthcare.          Patients top risk factors for readmission: falls and medical condition-COPD exacerbation  Interventions to address risk factors: Obtained and reviewed discharge summary and/or continuity of care documents and fall prevention       Care Transitions Subsequent and Final Call    Subsequent and Final Calls  Care Transitions Interventions  Other Interventions:             Care Transition Nurse provided contact information for future needs. Plan for follow-up call in 5-7 days based on severity of symptoms and risk factors.   Plan for next call:  cardiac cath post op    Daphine Snellen, RN

## 2022-11-25 RX ORDER — DOFETILIDE 0.25 MG/1
CAPSULE ORAL
Qty: 180 CAPSULE | Refills: 3 | Status: SHIPPED | OUTPATIENT
Start: 2022-11-25

## 2022-11-28 ENCOUNTER — HOSPITAL ENCOUNTER (OUTPATIENT)
Dept: CARDIAC CATH/INVASIVE PROCEDURES | Age: 77
Discharge: HOME | End: 2022-11-28
Payer: MEDICARE

## 2022-11-28 VITALS
RESPIRATION RATE: 18 BRPM | TEMPERATURE: 97.2 F | SYSTOLIC BLOOD PRESSURE: 168 MMHG | OXYGEN SATURATION: 97 % | HEART RATE: 67 BPM | WEIGHT: 218 LBS | HEIGHT: 64 IN | DIASTOLIC BLOOD PRESSURE: 67 MMHG | BODY MASS INDEX: 37.22 KG/M2

## 2022-11-28 DIAGNOSIS — I50.33 ACUTE ON CHRONIC DIASTOLIC CONGESTIVE HEART FAILURE (HCC): Primary | ICD-10-CM

## 2022-11-28 LAB
ANION GAP SERPL CALCULATED.3IONS-SCNC: 8 MMOL/L (ref 3–16)
BUN BLDV-MCNC: 17 MG/DL (ref 7–20)
CALCIUM SERPL-MCNC: 9.6 MG/DL (ref 8.3–10.6)
CHLORIDE BLD-SCNC: 97 MMOL/L (ref 99–110)
CO2: 36 MMOL/L (ref 21–32)
CREAT SERPL-MCNC: 1.2 MG/DL (ref 0.6–1.2)
GFR SERPL CREATININE-BSD FRML MDRD: 47 ML/MIN/{1.73_M2}
GLUCOSE BLD-MCNC: 108 MG/DL (ref 70–99)
POTASSIUM SERPL-SCNC: 4.4 MMOL/L (ref 3.5–5.1)
PRO-BNP: 1861 PG/ML (ref 0–449)
SODIUM BLD-SCNC: 141 MMOL/L (ref 136–145)

## 2022-11-28 PROCEDURE — 80048 BASIC METABOLIC PNL TOTAL CA: CPT

## 2022-11-28 PROCEDURE — 2500000003 HC RX 250 WO HCPCS

## 2022-11-28 PROCEDURE — C1894 INTRO/SHEATH, NON-LASER: HCPCS

## 2022-11-28 PROCEDURE — 83880 ASSAY OF NATRIURETIC PEPTIDE: CPT

## 2022-11-28 PROCEDURE — 99153 MOD SED SAME PHYS/QHP EA: CPT

## 2022-11-28 PROCEDURE — 2580000003 HC RX 258

## 2022-11-28 PROCEDURE — 6360000002 HC RX W HCPCS

## 2022-11-28 PROCEDURE — 33289 TCAT IMPL WRLS P-ART PRS SNR: CPT | Performed by: STUDENT IN AN ORGANIZED HEALTH CARE EDUCATION/TRAINING PROGRAM

## 2022-11-28 PROCEDURE — 99152 MOD SED SAME PHYS/QHP 5/>YRS: CPT | Performed by: STUDENT IN AN ORGANIZED HEALTH CARE EDUCATION/TRAINING PROGRAM

## 2022-11-28 PROCEDURE — 2709999900 HC NON-CHARGEABLE SUPPLY

## 2022-11-28 PROCEDURE — C1751 CATH, INF, PER/CENT/MIDLINE: HCPCS

## 2022-11-28 PROCEDURE — 33289 TCAT IMPL WRLS P-ART PRS SNR: CPT

## 2022-11-28 PROCEDURE — C1769 GUIDE WIRE: HCPCS

## 2022-11-28 PROCEDURE — C2624 WIRELESS PRESSURE SENSOR: HCPCS

## 2022-11-28 PROCEDURE — 99152 MOD SED SAME PHYS/QHP 5/>YRS: CPT

## 2022-11-28 RX ORDER — LORAZEPAM 0.5 MG/1
0.5 TABLET ORAL
Status: DISCONTINUED | OUTPATIENT
Start: 2022-11-28 | End: 2022-11-29 | Stop reason: HOSPADM

## 2022-11-28 RX ORDER — SODIUM CHLORIDE 0.9 % (FLUSH) 0.9 %
5-40 SYRINGE (ML) INJECTION EVERY 12 HOURS SCHEDULED
Status: DISCONTINUED | OUTPATIENT
Start: 2022-11-28 | End: 2022-11-29 | Stop reason: HOSPADM

## 2022-11-28 RX ORDER — FUROSEMIDE 10 MG/ML
40 INJECTION INTRAMUSCULAR; INTRAVENOUS ONCE
Status: DISCONTINUED | OUTPATIENT
Start: 2022-11-28 | End: 2022-11-29 | Stop reason: HOSPADM

## 2022-11-28 RX ORDER — SODIUM CHLORIDE 0.9 % (FLUSH) 0.9 %
5-40 SYRINGE (ML) INJECTION PRN
Status: DISCONTINUED | OUTPATIENT
Start: 2022-11-28 | End: 2022-11-29 | Stop reason: HOSPADM

## 2022-11-28 RX ORDER — SODIUM CHLORIDE 9 MG/ML
INJECTION, SOLUTION INTRAVENOUS PRN
Status: DISCONTINUED | OUTPATIENT
Start: 2022-11-28 | End: 2022-11-29 | Stop reason: HOSPADM

## 2022-11-28 NOTE — DISCHARGE INSTRUCTIONS
Cath Lab at  [address] [place] [address] Discharge Instructions        11/28/2022  Maria Antonia Mo   Date of Birth 1945     Allergies  morphine    CardioMEMS:    Please refer to your CardioMEMS Quick Start Guide that was provided to you at discharge. Activity:  No driving for 24 hours. No Tub baths, swimming or hot tubs for 5 days. In 24 hours you may remove dressing and shower. Wash site with soap and water and leave open to air  No lotions, powders or ointments near site for 5 days. No lifting over 5 pounds for 5 days. Return to work/school in 5 days unless instructed otherwise by your doctor. Diet:  Resume previous diet unless instructed otherwise by your doctor, if so general guidelines for a cardiac diet will be provided to you. Groin Management:  If you have stairs to walk up, pretend you have a cast on the affected leg walk up them without bending affected leg. When you go home go to the bed or sofa, do not get up except to go to the bathroom. Avoid strenuous activity for 5 days. For example, lifting heavy objects greater than 10 lbs, bicycling, jogging, climbing stairs, or walking long distances. If bleeding occurs from the site or a hematoma (lump), begins to increase in size, apply pressure directly over the site and call 911 to return to the hospital.     Special Instructions:  Report any coolness or numbness in the leg where the cath was done to the MD or call 911  Report any chills, fever, itching, red bumps or rash. Report any of the following to the MD: drainage from the cath site, redness and/or swelling at the site, increased tenderness at the site. If you are currently taking Metformin or Metformin combination medications for Diabetes, hold your dose for 48 hours after your procedure. Do not stop taking Plavix, Brilinta or Effient, without first consulting your cardiologist.    Sedation Discharge Instructions:   For the next 24 hours do not drive a car, operate machinery, power tools or kitchen appliances. Do not drink alcohol; including beer or wine. Do not make any important decisions or sign any important papers. For the next 24 hours you can expect drowsiness, light-headed or dizziness, nausea/ vomiting, inability to concentrate, fatigue and desire to sleep. We strongly suggest that a responsible adult be with for the next 24 hours, for your protection and safety. You are not allowed to drive yourself home, or take any type of public transportation.

## 2022-11-28 NOTE — PROGRESS NOTES
[employer] rep here and completed [employer] education. Patient and family received Booklet with patient electronics unit. [employer] Rep demonstrated process to complete a CardioMEMS reading at bedside prior to discharge. Both patient and  verbalize understanding. Patient was told to call [phone] for any questions.

## 2022-11-28 NOTE — PROCEDURES
[address]   Procedure Note    Pre-operative Diagnosis: NYHA Class III  CHF  Post-operative Diagnosis: Same  Anesthesia: Moderate Sedation, local anesthesia  Surgeons/Assistants: Funmilayo Rader MD  Estimated Blood Loss: less than 50   Complications: None  Specimens: Was Not Obtained      Procedure:   1.) CardioMems procedure CPT 49224 and   - Transcatheter implantation of wireless pulmonary artery pressure sensor for long term hemodynamic monitoring   - Deployment and calibration of the sensor  - Right heart catheterization   - Selective Pulmonary Catheterization  - Radiological supervision and interpretation,   - Pulmonary artery angiography   2.) Moderate sedation     Anesthesia: Moderate sedation  Sedation meds:  Fentanyl/Versed  Start time 1026  End time 1110  3mg Versed  150mcg Fentanyl  The patient was monitored continuously with ECG, pulse ox, BP monitoring, and direct observation. Procedural Details   Written informed consent was obtained in the pre-procedure area. The patient was brought to the cardiac catheterization laboratory in a fasting, non-sedated state. A time out procedure was performed with the entire cardiac catheterization laboratory staff,   confirming the patient's name, date of birth, type of procedure, indication for the procedure and site of procedure. Sedation was given - see nursing notes for medications administered. The right groin was sterilely prepped and draped. 2% Lidocaine was used to infiltrate the right groin. An 8F terumo sheath was placed into the right common femoral vein using modified Seldinger technique and then upsized to 12F. Sheath was aspired and flushed in usual fashion. A 7F balloon wedge pressure catheter was prepped, flushed, tested, and introduced into the RFV. The balloon wedge catheter was advanced to RA where pressures were obtained. Balloon inflated and the remaining pressure measurements were acquired throughout the right heart.  The cardiac output was measured by modified Blanca equation. A selective angiogram of a inferior branch of the left pulmonary artery was performed. 10cc of 50/50 visipaque contrast was delivered by hand injection after the catheter was advanced into proximal Left PA. After examining qualitatively (QCA) and calculating vessel diameter, ensuring vessel was >7mm in size, the optimal sensor placement was determined. Roadmap images were placed for reference. A 0.018 inch steel core ([employer]) wire was inserted and place distal to the target sensor location. The balloon wedge pressure catheter was removed. The CardioMEMS delivery catheter was removed from the sterile packaging and flushed in its original housing. The catheter was pre-soaked and agitated according to the s instructions to activate the  hydrophilic coating. The delivery catheter was placed over the wire carefully advancing it through the hub of the introducer sheath. The catheter was then advanced under fluoroscopic guidance to the desired implant location. After careful assessment, the tether mechanism was removed and the sensor was successfully released from the catheter. The delivery catheter was slowly withdrawn without disruption of the sensor. The steel core wire was then pulled back to the main PA. The balloon wedge pressure catheter was replaced over the wire proximal to the CardioMEMS sensor into the pulmonary artery. The wire was removed and pulmonary artery pressures were re-measured. The CardioMEMS sensor was calibrated to the mean PAP of the wedge pressure catheter. The values were exact. Baseline readings with the CardioMEMS HF Sensor were performed. A final angiogram was done using the balloon wedge pressure catheter. The balloon wedge pressure catheter was removed. The sheath was removed and the manual pressure was held over the access site. The patient tolerated the procedure well without complications. Contrast: 10 mL    RIGHT HEART CATH data      RA: 13  RV: 59/13  PA: 60/26 (38)     PCWP: 26    Sats:  Ao: 95%  RA: 72%  PA: 73%       Blanca   Cardiac Output 9.08   Cardiac Index 4.48       CONCLUSIONS   1. Successful placement of CardioMEMS internal Heart failure monitoring device in the Left pulmonary artery.          Recommendations   1.) Continue DOAC (Xarelto)  2.) Patient should transmit daily home CardioMems recordings as instructed  3.) F/u with Primary Cardiologist or HF team for further care    Dario Blanco MD  Interventional Cardiology  11/28/22  4:29 PM

## 2022-11-28 NOTE — PRE SEDATION
Sedation Pre-Procedure Note    Patient Name: Kushal Sterling   YOB: 1945  Room/Bed: Room/bed info not found  Medical Record Number: [id number]  Date: 11/28/2022   Time: 4:26 PM       Indication:  recurrent HF admissions    Consent: I have discussed with the patient and/or the patient representative the indication, alternatives, and the possible risks and/or complications of the planned procedure and the anesthesia methods. The patient and/or patient representative appear to understand and agree to proceed. Vital Signs:   Vitals:    11/28/22 0900   BP: (!) 168/67   Pulse: 67   Resp: 18   Temp: 97.2 °F (36.2 °C)   SpO2: 97%       Past Medical History:   has a past medical history of AAA (abdominal aortic aneurysm), AAA (abdominal aortic aneurysm) without rupture, Atrial fibrillation (HCC), CAD (coronary artery disease), CHF (congestive heart failure) ([facility] [address] COPD (chronic obstructive pulmonary disease) ([facility] [address] History of blood clots, Hyperlipidemia, and Hypertension. Past Surgical History:   has a past surgical history that includes Colonoscopy (09/02/2007); Hysterectomy (1990); Appendectomy (1990); bladder suspension; Cataract removal; Tonsillectomy (as a child); tumor excision; Cholecystectomy (10/15/2013); Colonoscopy (06/16/2017); joint replacement (12/2013); Abdominal aortic aneurysm repair; Cardiac surgery; and Cardiac catheterization (Right, 11/28/2022). Medications:   Scheduled Meds:    sodium chloride flush  5-40 mL IntraVENous 2 times per day    furosemide  40 mg IntraVENous Once     Continuous Infusions:    sodium chloride       PRN Meds: sodium chloride flush, sodium chloride, LORazepam  Home Meds:   Prior to Admission medications    Medication Sig Start Date End Date Taking?  Authorizing Provider   dofetilide (TIKOSYN) 250 MCG capsule TAKE ONE CAPSULE BY MOUTH EVERY 12 HOURS 11/25/22   Francisco Javier Chauhan MD   furosemide (LASIX) 20 MG tablet Take 2 tablets by mouth in the morning and 2 tablets in the evening. 11/23/22 12/23/22  Anita Harrington MD   isosorbide mononitrate (IMDUR) 60 MG extended release tablet Take 1 tablet by mouth Daily with lunch 11/9/22   Richie Pickett MD   hydrALAZINE (APRESOLINE) 50 MG tablet Take 1 tablet by mouth every 8 hours 11/8/22   Richie Pickett MD   dilTIAZem (CARDIZEM CD) 120 MG extended release capsule Take 1 capsule by mouth in the morning and at bedtime 11/8/22   Richie Pickett MD   potassium chloride (KLOR-CON M) 20 MEQ extended release tablet Take 1 tablet by mouth 2 times daily (with meals) 11/8/22   Richie Pickett MD   albuterol (PROVENTIL) (2.5 MG/3ML) 0.083% nebulizer solution Take 2.5 mg by nebulization every 4 hours as needed for Wheezing    Historical Provider, MD   clobetasol (TEMOVATE) 0.05 % cream Apply topically 2 times daily as needed    Historical Provider, MD   rivaroxaban (XARELTO) 20 MG TABS tablet TAKE ONE TABLET BY MOUTH DAILY WITH BREAKFAST 10/24/22   iDma Gregory DO   clopidogrel (PLAVIX) 75 MG tablet Take 1 tablet by mouth daily 9/20/22   Aramis Dominguez MD   SYMBICORT 160-4.5 MCG/ACT AERO Inhale 2 puffs into the lungs in the morning and 2 puffs before bedtime.  7/21/22   Dima Gregory DO   vitamin C (ASCORBIC ACID) 500 MG tablet Take 500 mg by mouth daily    Historical Provider, MD   albuterol sulfate  (90 Base) MCG/ACT inhaler INHALE TWO PUFFS BY MOUTH EVERY 4 HOURS AS NEEDED FOR WHEEZING OR FOR SHORTNESS OF BREATH 2/10/22   Dima Gregory, DO   rosuvastatin (CRESTOR) 20 MG tablet Take 1 tablet by mouth daily 1/25/22   CHRISTINE Iniguez - CNP   Multiple Vitamins-Minerals (MULTI FOR HER 50+ PO) Take 1 tablet by mouth daily    Historical Provider, MD     Coumadin Use Last 7 Days:  no  Antiplatelet drug therapy use last 7 days: no  Other anticoagulant use last 7 days: yes - Xarelto  Additional Medication Information:  as above      Pre-Sedation Documentation and Exam:   I have personally completed a history, physical exam & review of systems for this patient (see notes).     Mallampati Airway Assessment:  Mallampati Class II - (soft palate, fauces & uvula are visible)    Prior History of Anesthesia Complications:   none    ASA Classification:  Class 2 - A normal healthy patient with mild systemic disease    Sedation/ Anesthesia Plan:   intravenous sedation    Medications Planned:   midazolam (Versed) intravenously and fentanyl intravenously    Patient is an appropriate candidate for plan of sedation: yes    Electronically signed by Camilo Ayala MD on 11/28/2022 at 4:26 PM

## 2022-11-29 ENCOUNTER — CARE COORDINATION (OUTPATIENT)
Dept: CASE MANAGEMENT | Age: 77
End: 2022-11-29

## 2022-11-29 NOTE — CARE COORDINATION
[facility] Transitions Follow Up Call    2022    Patient: Maria Antonia Mo  Patient : 1945   MRN: [id number]  Reason for Admission: COPD exacerbation  Discharge Date: 22 RARS: Readmission Risk Score: 20.3         Spoke with: Katherine Siddiqui  Patient reports she is feeling pretty good. Eating and drinking well. No urinary or bowel elimination problems. Home care continued. Patient denies weakness, cough, chills, fever, cp, sob, nvd or swelling. She didn't weigh today. Uses O2 @ 3 lpm. She's tired today. Uses a walker if she going out for a long time. No questions, needs or concerns. Will continue to follow. Care Transitions Follow Up Call    Needs to be reviewed by the provider   Additional needs identified to be addressed with provider: No  none             Method of communication with provider : none      Care Transition Nurse (CTN) contacted the patient by telephone to follow up after admission on 2022. Verified name and  with patient as identifiers. Addressed changes since last contact: none  Discussed follow-up appointments. If no appointment was previously scheduled, appointment scheduling offered: na.   Is follow up appointment scheduled within 7 days of discharge? Yes. Advance Care Planning:   Does patient have an Advance Directive: reviewed and current. CTN reviewed discharge instructions, medical action plan and red flags with patient and discussed any barriers to care and/or understanding of plan of care after discharge. Discussed appropriate site of care based on symptoms and resources available to patient including: PCP  Specialist  Home health. The patient agrees to contact the PCP office for questions related to their healthcare.      Patients top risk factors for readmission: ineffective coping  medical condition-copd,chf,afib,htn  Interventions to address risk factors: Education of patient/family/caregiver/guardian to support self-management-       Non-BS follow up appointment(s):     CTN provided contact information for future needs. Plan for follow-up call in 5-7 days based on severity of symptoms and risk factors. Plan for next call: symptom management-tiredness  self management-           Care Transitions Subsequent and Final Call    Subsequent and Final Calls  Do you have any ongoing symptoms?: No  Have your medications changed?: No  Do you have any questions related to your medications?: No  Do you currently have any active services?: Yes  Are you currently active with any services?: Home Health  Do you have any needs or concerns that I can assist you with?: No  Identified Barriers: None  Care Transitions Interventions  Other Interventions:              Follow Up  Future Appointments   Date Time Provider Kiana Puri   12/9/2022  1:00 PM Alexa Mcclure MD [facility]   12/12/2022  2:00 PM CHRISTINE Stewart - CNP [facility]   2/21/2023  1:00 PM Jonna Nguyễn DO [facility]   4/25/2023  1:20 PM Rosio Brewer MD [facility] [address]       Perry Shin LPN

## 2022-12-01 ENCOUNTER — TELEPHONE (OUTPATIENT)
Dept: CARDIOLOGY CLINIC | Age: 77
End: 2022-12-01

## 2022-12-01 RX ORDER — FUROSEMIDE 20 MG/1
TABLET ORAL
Qty: 140 TABLET | Refills: 3 | Status: SHIPPED | OUTPATIENT
Start: 2022-12-01

## 2022-12-01 NOTE — TELEPHONE ENCOUNTER
Reviewed cardiomems. At implant, PAD 27 and she was hypervolemic. BNP 1861>received 40mg IV lasix. PAD remains high. Called pt and advised to increased lasix at home to 60mg n AM and 40mg in pm (was on 40mg BID). She will FU with Dr. Bobbi Duarte in office next week.

## 2022-12-08 NOTE — PROGRESS NOTES
[address]   Electrophysiology      Date: 12/12/2022    Primary Cardiologist: Israel Corbett MD  PCP: Chloe Kawasaki, MD     Chief Complaint:   Chief Complaint   Patient presents with    Follow-up     Afib- LI       History of Present Illness:    I saw Niki Kerns in the office for electrophysiology follow up today. She is a 69 y.o. female with a past medical history of persistent atrial fibrillation, AAA, CAD, HTN, COPD, PE, DVT and diastolic HF s/p CardioMems. She underwent EP study with radiofrequency ablation of atrial fibrillation and pulmonary vein isolation, additional ablation with creation of a roof line (for L flutter) and anterior line from mitral annulus to the roof (for L flutter) on 10/7/20 with Dr. Ivet Stein. She had recurrent A fib when seen in the office for follow up in January 2021. She underwent successful DC cardioversion on 1/12/21. She then underwent EP study with RFA of left atrial flutter with pulmonary vein isolation, roof line ablation (for L flutter), anterior wall (L flutter), appendage ablation (for L flutter) and low anterior wall ablation (for L flutter) on 2/17/21 with Dr. Ivet Stein. She had recurrent atrial flutter when seen in the office on 1/25/22. She underwent atrial fibrillation (PVI, CAFE), left atrial flutter (roof line) and left atrial tachycardia ablation on 2/4/22 with Dr. Ivet Stein. She was resumed on flecaindie 50mg BID and Toprol was decreased to 25mg QD. She was back in atrial fibrillation when seen by Nathaly Gomez NP on 3/21/22. She was seen in the office last week and remained in atrial fibrillation. She did not want to schedule any procedures at that time and instead wanted to increase flecainide to 100mg BID. She remained in atrial fibrillation when seen in the office on 4/12/22. She underwent successful cardioversion on 4/15/22 with Dr. Christina Bhatia. She She was eventually started on dofetilide in May 2022.     In November 2022 she was hospitalized at [facility] for heart failure exacerbation. She had periods of atrial runs, PSVT and frequent PACs while admitted. She seemed to respond better to diltiazem which was started while hospitalized and to minimize AV geno blocking agents, Toprol was stopped. She presents today for follow up. She has been doing pretty well since she was last seen. She does have rare palpitations, says they have improved since her last hospital stay. Her breathing is better since being on oxygen. No edema. No syncope. No bleeding issues. Allergies: Allergies   Allergen Reactions    Morphine Rash     rash     Home Medications:  Prior to Visit Medications    Medication Sig Taking?  Authorizing Provider   rosuvastatin (CRESTOR) 20 MG tablet Take 1 tablet by mouth daily Yes CHRISTINE Christianson CNP   dilTIAZem (CARDIZEM CD) 120 MG extended release capsule TAKE ONE CAPSULE BY MOUTH EVERY MORNING AND TAKE ONE CAPSULE BY MOUTH EVERY NIGHT AT BEDTIME Yes CHRISTINE Young CNP   hydrALAZINE (APRESOLINE) 50 MG tablet TAKE ONE TABLET BY MOUTH EVERY 8 HOURS Yes CHRISTINE Christianson CNP   potassium chloride (KLOR-CON M) 20 MEQ extended release tablet TAKE 1 TABLET BY MOUTH 2 TIMES A DAY WITH MEALS Yes CHRISTINE Christianson CNP   cefUROXime (CEFTIN) 500 MG tablet Take 1 tablet by mouth 2 times daily for 7 days Yes CHRISTINE Young CNP   isosorbide mononitrate (IMDUR) 60 MG extended release tablet TAKE ONE TABLET BY MOUTH ONE TIME A DAY WITH LUNCH Yes Judi Gamez MD   rivaroxaban (XARELTO) 20 MG TABS tablet TAKE ONE TABLET BY MOUTH DAILY WITH BREAKFAST Yes Dominguez Mccall MD   furosemide (LASIX) 20 MG tablet Lasix 60mg in AM and 40mg in PM Yes CHRISTINE Carbajal CNP   dofetilide (TIKOSYN) 250 MCG capsule TAKE ONE CAPSULE BY MOUTH EVERY 12 HOURS Yes Mildred Bowie MD   albuterol (PROVENTIL) (2.5 MG/3ML) 0.083% nebulizer solution Take 2.5 mg by nebulization every 4 hours as needed for Wheezing Yes Historical Provider, MD   clopidogrel (PLAVIX) 75 MG tablet Take 1 tablet by mouth daily Yes Ricky Hermosillo MD   SYMBICORT 160-4.5 MCG/ACT AERO Inhale 2 puffs into the lungs in the morning and 2 puffs before bedtime. Yes Didi Cea, DO   vitamin C (ASCORBIC ACID) 500 MG tablet Take 500 mg by mouth daily Yes Historical Provider, MD   albuterol sulfate  (90 Base) MCG/ACT inhaler INHALE TWO PUFFS BY MOUTH EVERY 4 HOURS AS NEEDED FOR WHEEZING OR FOR SHORTNESS OF BREATH Yes Arvie Cea, DO   Multiple Vitamins-Minerals (MULTI FOR HER 50+ PO) Take 1 tablet by mouth daily Yes Historical Provider, MD        Past Medical History:  Past Medical History:   Diagnosis Date    AAA (abdominal aortic aneurysm)     pt states it is 4cm    AAA (abdominal aortic aneurysm) without rupture 2/10/2015    Atrial fibrillation (HCC)     CAD (coronary artery disease)     CHF (congestive heart failure) (HCC)     COPD (chronic obstructive pulmonary disease) ([facility] [address]     History of blood clots     Hyperlipidemia     Hypertension        Past Surgical History:   Past Surgical History:   Procedure Laterality Date    ABDOMINAL AORTIC ANEURYSM REPAIR      Endovascular abdominal AA    APPENDECTOMY  1990    incidental    BLADDER SUSPENSION      CARDIAC CATHETERIZATION Right 11/28/2022    Cardiomems PA sensor device implant Left PA    CARDIAC SURGERY      CATARACT REMOVAL      CHOLECYSTECTOMY  10/15/2013    COLONOSCOPY  09/02/2007    dr Mikaela Campa and check in 5 years. COLONOSCOPY  06/16/2017    ok dr arndt, repeat 5 years    HYSTERECTOMY ([address])  1990    for benign tumor. just the uterus    JOINT REPLACEMENT  12/2013    right knee replacement    TONSILLECTOMY  as a child    TUMOR EXCISION      benign behind right ear about 2008       Social History:   reports that she quit smoking about 9 years ago. Her smoking use included cigarettes. She started smoking about 46 years ago. She has a 37.00 pack-year smoking history.  She has been exposed to tobacco smoke. She has never used smokeless tobacco. She reports that she does not drink alcohol and does not use drugs. Family History:      Problem Relation Age of Onset    Heart Disease Father     Hypertension Other        Review of Systems   Constitutional:  Negative for chills, fatigue, fever and unexpected weight change. HENT:  Negative for congestion, hearing loss, sinus pressure, sore throat and trouble swallowing. Respiratory:  Positive for shortness of breath. Negative for cough and wheezing. Cardiovascular:  Positive for palpitations. Negative for chest pain and leg swelling. Gastrointestinal:  Negative for abdominal pain, blood in stool, constipation, diarrhea, nausea and vomiting. Genitourinary:  Positive for dysuria. Negative for hematuria. Musculoskeletal:  Negative for arthralgias, back pain, gait problem and myalgias. Skin:  Negative for color change, rash and wound. Neurological:  Negative for dizziness, seizures, syncope, speech difficulty, weakness and light-headedness. Hematological:  Does not bruise/bleed easily. Physical Examination:  Vitals:    12/12/22 1356   BP: 138/78   Pulse:    SpO2:       Wt Readings from Last 3 Encounters:   12/12/22 222 lb (100.7 kg)   12/11/22 229 lb 11.5 oz (104.2 kg)   11/28/22 218 lb (98.9 kg)       Physical Exam  Vitals reviewed. Constitutional:       General: She is not in acute distress. Appearance: Normal appearance. HENT:      Head: Normocephalic and atraumatic. Nose: Nose normal.      Mouth/Throat:      Mouth: Mucous membranes are moist.   Eyes:      Conjunctiva/sclera: Conjunctivae normal.      Pupils: Pupils are equal, round, and reactive to light. Cardiovascular:      Rate and Rhythm: Normal rate and regular rhythm. Heart sounds: No murmur heard. No friction rub. No gallop. Pulmonary:      Effort: No respiratory distress. Breath sounds: Decreased breath sounds present.  No wheezing, rhonchi or rales. Abdominal:      General: Abdomen is flat. Bowel sounds are normal.      Palpations: Abdomen is soft. Musculoskeletal:         General: Normal range of motion. Right lower leg: No edema. Left lower leg: No edema. Skin:     General: Skin is warm and dry. Findings: No bruising. Neurological:      General: No focal deficit present. Mental Status: She is alert and oriented to person, place, and time. Motor: No weakness. Psychiatric:         Mood and Affect: Mood normal.         Behavior: Behavior normal.        Pertinent labs, diagnostic, device, and imaging results reviewed as a part of this visit    LABS    CBC:   Lab Results   Component Value Date    WBC 15.8 (H) 2022    HGB 9.1 (L) 2022    HCT 29.2 (L) 2022    MCV 88.7 2022     2022     BMP:   Lab Results   Component Value Date    CREATININE 1.4 (H) 2022    BUN 24 (H) 2022     2022    K 3.7 2022    CL 91 (L) 2022    CO2 35 (H) 2022     Estimated Creatinine Clearance: 39 mL/min (A) (based on SCr of 1.4 mg/dL (H)). No results found for: BNP    Thyroid:   Lab Results   Component Value Date    TSH 0.66 2019     Lipid Panel:   Lab Results   Component Value Date/Time    CHOL 107 09/10/2021 07:22 AM    HDL 56 09/10/2021 07:22 AM    HDL 38 2011 03:35 PM    TRIG 59 09/10/2021 07:22 AM     LFTs:  Lab Results   Component Value Date    ALT 9 (L) 2022    AST 15 2022    ALKPHOS 64 2022    BILITOT 0.3 2022     Coags:   Lab Results   Component Value Date    PROTIME 17.0 (H) 2022    INR 1.38 (H) 2022    APTT 37.2 (H) 02/15/2020       EC2022   SR at 76 BPM. Baseline artifact. Qtc 444ms. Echo: 11/3/22   Borderline conc. LVH with normal wall motion; EF   70%. Grade II diastolic   dysfunction with elevated LH filling pressures. The left atrium is severely dilated.    The right ventricle is normal in size and function. Mild mitral, aortic and tricuspid regurgitation. LHC: 3/5/18  OVERALL IMPRESSION  1. Again, 100% proximal right coronary artery occlusion with left to right  collaterals. 2.  Mild disease of the distal left main trunk. 3.  20% to 30% ostial left anterior descending artery stenosis with  collaterals from LAD to the right coronary artery. 4.  30% to 40% stenosis of the proximal circumflex artery. 5.  Normal left ventricular systolic function with estimated EF of 55% to  60%. 6.  Slightly enlarged aortic root with no evidence of aortic stenosis or  Regurgitation. RHC: 2/28/20  OVERALL IMPRESSION:  1.  _____ normal right cardiac pressure. 2.  Elevated pulmonary capillary wedge pressure with a mean pulmonary  capillary wedge of 29 mmHg. 3.  Normal cardiac output and indices. 4.  No oxygen step off to suggest ASD/PFO. Stress: 2/19/18  Conclusions          Summary     Abnormal study. There is a moderate sized, mild intensity, partially     reversible defect of the mid to apical anterior and mid anterolateral walls     which is consistent with ischemia. There is breast attenuation but stress     images appear worse. Normal LV size and systolic function. Overall findings represent an intermediate risk study. Procedures:  1. PVI, roof line (for L flutter), anterior line ablation (for L flutter) 10/7/20, Dr. Contreras Client  2. Successful DCCV for persistent A fib, 1/12/21, Dr. Contreras Client  3. PVI for L flutter, roof line (for L flutter), anterior wall (L flutter), appendage ablation (for L flutter) and low anterior wall ablation (for L flutter), 2/17/2, Dr. Contreras Client. 4. Atrial fibrillation (PVI, CAFE), left atrial flutter (roof line) and left atrial tachycardia ablation on 2/4/22, Dr. Contreras Client  5.  Successful DCCV for atrial fibrillation, 4/15/22, Dr. Carlyle Edouard and Plan:      Persistent atrial fibrillation             - s/p PVI ablation on 10/7/20 with Dr. Contreras Client, had recurrent A fib noted in January 2021 and had successful DCCV later that month              - s/p PVI again in February 2021 and February 2022           - back in atrial fibrillation on 3/21/22           - s/p DCCV on 4/15/22, recurrence of A fib on EKG on 5/5/22, converted on 5/25/22 while hospitalized for dofetilide loading                - CHADS2-VASc 5 (age, gender, HTN, HF, CAD) on Xarelto 20mg QD (CrCl 55mL/min with actual age)   - EKG today with SR, Qtc 444ms              - on dofetilide 250mcg BID and diltiazem 120mg BID - continue     Left atrial flutter           - seen on EP study s/p roof line and anterior line ablations 10/7/20           - repeat ablation detailed above on 2/17/21            - had recurrent atrial flutter when seen in January 2022 s/p repeat ablation 2/4/22           - see above                Chronic diastolic heart failure             - EF 60%            - appears well compensated, s/p CardioMems   - continue medical therapy   - managed by Dr. Sherwin Richey, NP     CAD              - stable              - on statin, beta blocker     AAA              - s/p repair in 2018 by Dr. Aleja Wang HTN           - continue medical therapy    Thank you for allowing to us to participate in the care of Abby Lei. Follow up with Dr. Goldie Andrea in 6 months.     CHRISTINE Cartagena  The [address], [address] [facility]  Phone: [phone]  Fax: [phone]    Electronically signed by CHRISTINE Stewart - CNP on 12/12/2022 at 2:25 PM

## 2022-12-09 DIAGNOSIS — E78.5 HYPERLIPIDEMIA LDL GOAL <70: ICD-10-CM

## 2022-12-09 RX ORDER — ROSUVASTATIN CALCIUM 20 MG/1
TABLET, COATED ORAL
Qty: 90 TABLET | Refills: 3 | OUTPATIENT
Start: 2022-12-09

## 2022-12-09 RX ORDER — HYDRALAZINE HYDROCHLORIDE 50 MG/1
TABLET, FILM COATED ORAL
Qty: 270 TABLET | Refills: 1 | Status: SHIPPED | OUTPATIENT
Start: 2022-12-09 | End: 2022-12-12 | Stop reason: SDUPTHER

## 2022-12-09 RX ORDER — ISOSORBIDE MONONITRATE 60 MG/1
TABLET, EXTENDED RELEASE ORAL
Qty: 90 TABLET | Refills: 1 | Status: SHIPPED | OUTPATIENT
Start: 2022-12-09 | End: 2022-12-16

## 2022-12-09 RX ORDER — DILTIAZEM HYDROCHLORIDE 120 MG/1
CAPSULE, COATED, EXTENDED RELEASE ORAL
Qty: 180 CAPSULE | Refills: 1 | Status: SHIPPED | OUTPATIENT
Start: 2022-12-09 | End: 2022-12-12 | Stop reason: SDUPTHER

## 2022-12-09 RX ORDER — POTASSIUM CHLORIDE 20 MEQ/1
TABLET, EXTENDED RELEASE ORAL
Qty: 180 TABLET | Refills: 1 | Status: SHIPPED | OUTPATIENT
Start: 2022-12-09 | End: 2022-12-12 | Stop reason: SDUPTHER

## 2022-12-11 ENCOUNTER — HOSPITAL ENCOUNTER (EMERGENCY)
Age: 77
Discharge: HOME | End: 2022-12-11
Payer: MEDICARE

## 2022-12-11 VITALS
OXYGEN SATURATION: 97 % | RESPIRATION RATE: 26 BRPM | SYSTOLIC BLOOD PRESSURE: 154 MMHG | TEMPERATURE: 97.9 F | HEART RATE: 85 BPM | WEIGHT: 229.72 LBS | BODY MASS INDEX: 39.43 KG/M2 | DIASTOLIC BLOOD PRESSURE: 73 MMHG

## 2022-12-11 DIAGNOSIS — N39.0 ACUTE URINARY TRACT INFECTION: Primary | ICD-10-CM

## 2022-12-11 LAB
ANION GAP SERPL CALCULATED.3IONS-SCNC: 12 MMOL/L (ref 3–16)
BACTERIA: ABNORMAL /HPF
BASOPHILS ABSOLUTE: 0 K/UL (ref 0–0.2)
BASOPHILS RELATIVE PERCENT: 0.2 %
BILIRUBIN URINE: NEGATIVE
BLOOD, URINE: ABNORMAL
BUN BLDV-MCNC: 24 MG/DL (ref 7–20)
CALCIUM SERPL-MCNC: 9.5 MG/DL (ref 8.3–10.6)
CHLORIDE BLD-SCNC: 91 MMOL/L (ref 99–110)
CLARITY: ABNORMAL
CO2: 35 MMOL/L (ref 21–32)
COLOR: YELLOW
CREAT SERPL-MCNC: 1.4 MG/DL (ref 0.6–1.2)
EOSINOPHILS ABSOLUTE: 0.2 K/UL (ref 0–0.6)
EOSINOPHILS RELATIVE PERCENT: 1.4 %
EPITHELIAL CELLS, UA: 1 /HPF (ref 0–5)
GFR SERPL CREATININE-BSD FRML MDRD: 39 ML/MIN/{1.73_M2}
GLUCOSE BLD-MCNC: 98 MG/DL (ref 70–99)
GLUCOSE URINE: NEGATIVE MG/DL
HCT VFR BLD CALC: 29.2 % (ref 36–48)
HEMOGLOBIN: 9.1 G/DL (ref 12–16)
KETONES, URINE: NEGATIVE MG/DL
LEUKOCYTE ESTERASE, URINE: ABNORMAL
LYMPHOCYTES ABSOLUTE: 1 K/UL (ref 1–5.1)
LYMPHOCYTES RELATIVE PERCENT: 6.1 %
MCH RBC QN AUTO: 27.5 PG (ref 26–34)
MCHC RBC AUTO-ENTMCNC: 31 G/DL (ref 31–36)
MCV RBC AUTO: 88.7 FL (ref 80–100)
MICROSCOPIC EXAMINATION: YES
MONOCYTES ABSOLUTE: 1 K/UL (ref 0–1.3)
MONOCYTES RELATIVE PERCENT: 6.2 %
NEUTROPHILS ABSOLUTE: 13.6 K/UL (ref 1.7–7.7)
NEUTROPHILS RELATIVE PERCENT: 86.1 %
NITRITE, URINE: NEGATIVE
PDW BLD-RTO: 16.8 % (ref 12.4–15.4)
PH UA: 5.5 (ref 5–8)
PLATELET # BLD: 262 K/UL (ref 135–450)
PMV BLD AUTO: 9.8 FL (ref 5–10.5)
POTASSIUM SERPL-SCNC: 3.7 MMOL/L (ref 3.5–5.1)
PROTEIN UA: NEGATIVE MG/DL
RBC # BLD: 3.3 M/UL (ref 4–5.2)
RBC UA: 25 /HPF (ref 0–4)
SODIUM BLD-SCNC: 138 MMOL/L (ref 136–145)
SPECIFIC GRAVITY UA: 1.01 (ref 1–1.03)
URINE REFLEX TO CULTURE: YES
URINE TYPE: ABNORMAL
UROBILINOGEN, URINE: 0.2 E.U./DL
WBC # BLD: 15.8 K/UL (ref 4–11)
WBC UA: 265 /HPF (ref 0–5)

## 2022-12-11 PROCEDURE — 81001 URINALYSIS AUTO W/SCOPE: CPT

## 2022-12-11 PROCEDURE — 51798 US URINE CAPACITY MEASURE: CPT

## 2022-12-11 PROCEDURE — 85025 COMPLETE CBC W/AUTO DIFF WBC: CPT

## 2022-12-11 PROCEDURE — 87086 URINE CULTURE/COLONY COUNT: CPT

## 2022-12-11 PROCEDURE — 2580000003 HC RX 258: Performed by: PHYSICIAN ASSISTANT

## 2022-12-11 PROCEDURE — 87077 CULTURE AEROBIC IDENTIFY: CPT

## 2022-12-11 PROCEDURE — 87186 SC STD MICRODIL/AGAR DIL: CPT

## 2022-12-11 PROCEDURE — 6360000002 HC RX W HCPCS: Performed by: PHYSICIAN ASSISTANT

## 2022-12-11 PROCEDURE — 80048 BASIC METABOLIC PNL TOTAL CA: CPT

## 2022-12-11 PROCEDURE — 96365 THER/PROPH/DIAG IV INF INIT: CPT

## 2022-12-11 PROCEDURE — 99284 EMERGENCY DEPT VISIT MOD MDM: CPT

## 2022-12-11 PROCEDURE — 36415 COLL VENOUS BLD VENIPUNCTURE: CPT

## 2022-12-11 RX ORDER — CEFUROXIME AXETIL 500 MG/1
500 TABLET ORAL 2 TIMES DAILY
Qty: 14 TABLET | Refills: 0 | Status: SHIPPED | OUTPATIENT
Start: 2022-12-11 | End: 2022-12-12 | Stop reason: ALTCHOICE

## 2022-12-11 RX ADMIN — CEFTRIAXONE 1000 MG: 1 INJECTION, POWDER, FOR SOLUTION INTRAMUSCULAR; INTRAVENOUS at 21:01

## 2022-12-11 NOTE — ED TRIAGE NOTES
Patient arrived via EMS complaining of urinary retention x1 day. States she is on lasix x2 daily but has not taken the evening dose yet. States she has tried to 7 Cups of Tea today but only a few drops comes out at a time. Patient is on 3L home oxygen.  State she is not more short of breath then normal. History of COPD and CHF

## 2022-12-11 NOTE — ED PROVIDER NOTES
[address]  [address] [address]  Dept: [phone]  Loc: [address] ENCOUNTER        This patient was not seen or evaluated by the attending physician. I evaluated this patient, the attending physician was available for consultation. CHIEF COMPLAINT    Chief Complaint   Patient presents with    Urinary Retention       HPI    José Luis Hopper is a 69 y.o. female who presents with urinary urgency and difficulty urinating. Onset was at noon. The duration has been constant since the onset. The patient denies associated abdominal pain. There are no alleviating factors. The context is that the symptoms started spontaneously. REVIEW OF SYSTEMS    : see HPI, no hematuria  GI: No vomiting or diarrhea  General: No fevers  Neurologic: No bowel incontinence, No saddle anesthesia, No leg weakness  All other systems reviewed and are negative. PAST MEDICAL & SURGICAL HISTORY    Past Medical History:   Diagnosis Date    AAA (abdominal aortic aneurysm)     pt states it is 4cm    AAA (abdominal aortic aneurysm) without rupture 2/10/2015    Atrial fibrillation (HCC)     CAD (coronary artery disease)     CHF (congestive heart failure) ([facility])     COPD (chronic obstructive pulmonary disease) ([facility] [address]     History of blood clots     Hyperlipidemia     Hypertension      Past Surgical History:   Procedure Laterality Date    ABDOMINAL AORTIC ANEURYSM REPAIR      Endovascular abdominal AA    APPENDECTOMY  1990    incidental    BLADDER SUSPENSION      CARDIAC CATHETERIZATION Right 11/28/2022    Cardiomems PA sensor device implant Left PA    CARDIAC SURGERY      CATARACT REMOVAL      CHOLECYSTECTOMY  10/15/2013    COLONOSCOPY  09/02/2007    dr Pavon Area and check in 5 years. COLONOSCOPY  06/16/2017    ok dr arndt, repeat 5 years    HYSTERECTOMY ([address] [facility])  1990    for benign tumor.  just the uterus JOINT REPLACEMENT  12/2013    right knee replacement    TONSILLECTOMY  as a child    TUMOR EXCISION      benign behind right ear about 2008       CURRENT MEDICATIONS  (may include discharge medications prescribed in the ED)  Current Outpatient Rx   Medication Sig Dispense Refill    dofetilide (TIKOSYN) 250 MCG capsule TAKE ONE CAPSULE BY MOUTH EVERY 12 HOURS 180 capsule 3    potassium chloride (KLOR-CON M) 20 MEQ extended release tablet TAKE 1 TABLET BY MOUTH 2 TIMES A DAY WITH MEALS 180 tablet 1    hydrALAZINE (APRESOLINE) 50 MG tablet TAKE ONE TABLET BY MOUTH EVERY 8 HOURS 270 tablet 1    dilTIAZem (CARDIZEM CD) 120 MG extended release capsule TAKE ONE CAPSULE BY MOUTH EVERY MORNING AND TAKE ONE CAPSULE BY MOUTH EVERY NIGHT AT BEDTIME 180 capsule 1    isosorbide mononitrate (IMDUR) 60 MG extended release tablet TAKE ONE TABLET BY MOUTH ONE TIME A DAY WITH LUNCH 90 tablet 1    rivaroxaban (XARELTO) 20 MG TABS tablet TAKE ONE TABLET BY MOUTH DAILY WITH BREAKFAST 30 tablet 11    furosemide (LASIX) 20 MG tablet Lasix 60mg in AM and 40mg in  tablet 3    albuterol (PROVENTIL) (2.5 MG/3ML) 0.083% nebulizer solution Take 2.5 mg by nebulization every 4 hours as needed for Wheezing      clobetasol (TEMOVATE) 0.05 % cream Apply topically 2 times daily as needed      clopidogrel (PLAVIX) 75 MG tablet Take 1 tablet by mouth daily 30 tablet 3    SYMBICORT 160-4.5 MCG/ACT AERO Inhale 2 puffs into the lungs in the morning and 2 puffs before bedtime.  1 each 11    vitamin C (ASCORBIC ACID) 500 MG tablet Take 500 mg by mouth daily      albuterol sulfate  (90 Base) MCG/ACT inhaler INHALE TWO PUFFS BY MOUTH EVERY 4 HOURS AS NEEDED FOR WHEEZING OR FOR SHORTNESS OF BREATH 1 each 3    rosuvastatin (CRESTOR) 20 MG tablet Take 1 tablet by mouth daily 90 tablet 2    Multiple Vitamins-Minerals (MULTI FOR HER 50+ PO) Take 1 tablet by mouth daily         ALLERGIES    Allergies   Allergen Reactions    Morphine Rash     rash SOCIAL & FAMILY HISTORY    Social History     Socioeconomic History    Marital status:    Occupational History    Occupation: housewife   Tobacco Use    Smoking status: Former     Packs/day: 1.00     Years: 37.00     Pack years: 37.00     Types: Cigarettes     Start date: 1976     Quit date: 2013     Years since quittin.2     Passive exposure: Past    Smokeless tobacco: Never    Tobacco comments:     E cigarette now and then   Vaping Use    Vaping Use: Former    Quit date: 2021    Substances: Nicotine   Substance and Sexual Activity    Alcohol use: No    Drug use: No    Sexual activity: Yes     Partners: Male     Social Determinants of Health     Financial Resource Strain: Low Risk     Difficulty of Paying Living Expenses: Not hard at all   Food Insecurity: No Food Insecurity    Worried About Running Out of Food in the Last Year: Never true    Ran Out of Food in the Last Year: Never true   Transportation Needs: No Transportation Needs    Lack of Transportation (Medical): No    Lack of Transportation (Non-Medical): No     Family History   Problem Relation Age of Onset    Heart Disease Father     Hypertension Other        PHYSICAL EXAM    VITAL SIGNS: BP (!) 149/74   Pulse 85   Temp 97.9 °F (36.6 °C) (Oral)   Resp (!) 31   Wt 229 lb 11.5 oz (104.2 kg)   SpO2 98%   BMI 39.43 kg/m²    Constitutional:  Well developed, well nourished, no acute distress  HEENT:  Atraumatic, moist mucus membranes  Neck: No JVD, supple   Respiratory:  No respiratory distress, normal breath sounds  Cardiovascular:  Regular rate, no murmurs   Vascular: DP pulses 2+ and equal bilaterally  GI:  Soft, +mild suprapubic abdominal fullness and tenderness, bowel sounds present, no audible bruits or palpable pulsatile masses  Back: no CVA tenderness  Integument:  Skin is warm and dry   Neurologic:  awake, alert and oriented, motor is 5/5 bilaterally, patellar reflexes 2+ and equal bilaterally, sensation intact bilaterally    RADIOLOGY/PROCEDURES    No orders to display       ED COURSE & MEDICAL DECISION MAKING    Pertinent Labs studies interpreted and reviewed. (See chart for details)  See chart for details of medications given during the ED stay. Vitals:    12/11/22 1758 12/11/22 1830 12/11/22 1845 12/11/22 1915   BP: (!) 168/74 (!) 144/56 (!) 169/73 (!) 149/74   Pulse: 89 81 84 85   Resp: 25 20 20 (!) 31   Temp: 97.9 °F (36.6 °C)      TempSrc: Oral      SpO2: 97% 99% 99% 98%   Weight: 229 lb 11.5 oz (104.2 kg)          Differential Diagnosis: Urinary retention due to blood clotting in the bladder (may have coagulopathy or platelet dysfunction), or other postobstructive mechanical problem, urinary retention due to cauda equina syndrome or other neurologic cause, urinary tract infection, renal failure causing decreased urine output, or urosepsis, AAA, bowel obstruction, other    Patient is afebrile and nontoxic in appearance. She was admitted in November with shortness of breath secondary to COPD exacerbation and CHF, she denies any respiratory symptoms today. A bladder scan was performed and showed only 103 mL of urine. The patient subsequently stated that she urinated a small amount while lying in bed. Urinalysis obtained by straight cath. CBC reveals white count of 15,800, patient was elevated in this range on recent admission. Metabolic panel reveals creatinine of 1.4, near baseline. Urinalysis reveals 265 white cells with 3+ bacteria consistent with acute UTI. Rocephin 1 g IV given for initial treatment, will prescribe course of Ceftin. Reevaluation at 8:30 PM: Patient is resting comfortably. She has no complaints at this time. I believe she is safe for discharge. The patient was instructed to follow up as an outpatient in 1-2 days. The patient was instructed to return to the ED immediately for any new or worsening symptoms. The patient verbalized understanding.     FINAL IMPRESSION    1. Acute urinary tract infection        PLAN  Discharge with close outpatient follow-up (see EMR)       (Please note that this note was completed with a voice recognition program.  Every attempt was made to edit the dictations, but inevitably there remain words that are mis-transcribed.)               Chavo Saulma  12/11/22 2033

## 2022-12-12 ENCOUNTER — OFFICE VISIT (OUTPATIENT)
Dept: CARDIOLOGY CLINIC | Age: 77
End: 2022-12-12
Payer: MEDICARE

## 2022-12-12 VITALS
SYSTOLIC BLOOD PRESSURE: 138 MMHG | WEIGHT: 222 LBS | BODY MASS INDEX: 37.9 KG/M2 | OXYGEN SATURATION: 99 % | HEART RATE: 74 BPM | DIASTOLIC BLOOD PRESSURE: 78 MMHG | HEIGHT: 64 IN

## 2022-12-12 DIAGNOSIS — I25.10 CORONARY ARTERY DISEASE INVOLVING NATIVE CORONARY ARTERY OF NATIVE HEART WITHOUT ANGINA PECTORIS: ICD-10-CM

## 2022-12-12 DIAGNOSIS — I71.40 ABDOMINAL AORTIC ANEURYSM (AAA) WITHOUT RUPTURE, UNSPECIFIED PART: ICD-10-CM

## 2022-12-12 DIAGNOSIS — I48.19 PERSISTENT ATRIAL FIBRILLATION (HCC): Primary | ICD-10-CM

## 2022-12-12 DIAGNOSIS — I48.92 LEFT ATRIAL FLUTTER BY ELECTROCARDIOGRAM (HCC): ICD-10-CM

## 2022-12-12 DIAGNOSIS — I50.32 CHRONIC DIASTOLIC HEART FAILURE (HCC): ICD-10-CM

## 2022-12-12 DIAGNOSIS — E78.5 HYPERLIPIDEMIA LDL GOAL <70: ICD-10-CM

## 2022-12-12 DIAGNOSIS — I10 ESSENTIAL HYPERTENSION: ICD-10-CM

## 2022-12-12 PROCEDURE — 99214 OFFICE O/P EST MOD 30 MIN: CPT | Performed by: NURSE PRACTITIONER

## 2022-12-12 PROCEDURE — 3074F SYST BP LT 130 MM HG: CPT | Performed by: NURSE PRACTITIONER

## 2022-12-12 PROCEDURE — G8427 DOCREV CUR MEDS BY ELIG CLIN: HCPCS | Performed by: NURSE PRACTITIONER

## 2022-12-12 PROCEDURE — 1123F ACP DISCUSS/DSCN MKR DOCD: CPT | Performed by: NURSE PRACTITIONER

## 2022-12-12 PROCEDURE — G8399 PT W/DXA RESULTS DOCUMENT: HCPCS | Performed by: NURSE PRACTITIONER

## 2022-12-12 PROCEDURE — 1090F PRES/ABSN URINE INCON ASSESS: CPT | Performed by: NURSE PRACTITIONER

## 2022-12-12 PROCEDURE — G8417 CALC BMI ABV UP PARAM F/U: HCPCS | Performed by: NURSE PRACTITIONER

## 2022-12-12 PROCEDURE — G8484 FLU IMMUNIZE NO ADMIN: HCPCS | Performed by: NURSE PRACTITIONER

## 2022-12-12 PROCEDURE — 1036F TOBACCO NON-USER: CPT | Performed by: NURSE PRACTITIONER

## 2022-12-12 PROCEDURE — 93000 ELECTROCARDIOGRAM COMPLETE: CPT | Performed by: NURSE PRACTITIONER

## 2022-12-12 PROCEDURE — 3078F DIAST BP <80 MM HG: CPT | Performed by: NURSE PRACTITIONER

## 2022-12-12 RX ORDER — DILTIAZEM HYDROCHLORIDE 120 MG/1
CAPSULE, COATED, EXTENDED RELEASE ORAL
Qty: 180 CAPSULE | Refills: 1 | Status: SHIPPED | OUTPATIENT
Start: 2022-12-12

## 2022-12-12 RX ORDER — HYDRALAZINE HYDROCHLORIDE 50 MG/1
TABLET, FILM COATED ORAL
Qty: 270 TABLET | Refills: 1 | Status: SHIPPED | OUTPATIENT
Start: 2022-12-12

## 2022-12-12 RX ORDER — CEFUROXIME AXETIL 500 MG/1
500 TABLET ORAL 2 TIMES DAILY
Qty: 14 TABLET | Refills: 0
Start: 2022-12-12 | End: 2022-12-19

## 2022-12-12 RX ORDER — POTASSIUM CHLORIDE 20 MEQ/1
TABLET, EXTENDED RELEASE ORAL
Qty: 180 TABLET | Refills: 1 | Status: SHIPPED | OUTPATIENT
Start: 2022-12-12

## 2022-12-12 RX ORDER — ROSUVASTATIN CALCIUM 20 MG/1
20 TABLET, COATED ORAL DAILY
Qty: 90 TABLET | Refills: 3 | Status: SHIPPED | OUTPATIENT
Start: 2022-12-12

## 2022-12-12 RX ORDER — DILTIAZEM HYDROCHLORIDE 120 MG/1
CAPSULE, COATED, EXTENDED RELEASE ORAL
Qty: 180 CAPSULE | Refills: 3 | Status: CANCELLED | OUTPATIENT
Start: 2022-12-12

## 2022-12-12 RX ORDER — HYDRALAZINE HYDROCHLORIDE 50 MG/1
TABLET, FILM COATED ORAL
Qty: 180 TABLET | Refills: 3 | Status: CANCELLED | OUTPATIENT
Start: 2022-12-12

## 2022-12-12 RX ORDER — POTASSIUM CHLORIDE 20 MEQ/1
TABLET, EXTENDED RELEASE ORAL
Qty: 180 TABLET | Refills: 3 | Status: CANCELLED | OUTPATIENT
Start: 2022-12-12

## 2022-12-12 ASSESSMENT — ENCOUNTER SYMPTOMS
CONSTIPATION: 0
SHORTNESS OF BREATH: 1
SORE THROAT: 0
NAUSEA: 0
COUGH: 0
TROUBLE SWALLOWING: 0
VOMITING: 0
ABDOMINAL PAIN: 0
BACK PAIN: 0
WHEEZING: 0
BLOOD IN STOOL: 0
COLOR CHANGE: 0
SINUS PRESSURE: 0
DIARRHEA: 0

## 2022-12-14 ENCOUNTER — TELEPHONE (OUTPATIENT)
Dept: FAMILY MEDICINE CLINIC | Age: 77
End: 2022-12-14

## 2022-12-14 NOTE — TELEPHONE ENCOUNTER
----- Message from Ritasheyla Donaldson sent at 12/14/2022  2:03 PM EST -----  Subject: Appointment Request    Reason for Call: Established Patient Appointment needed: Routine ED Follow   Up Visit    QUESTIONS    Reason for appointment request? Available appointments did not meet   patient need     Additional Information for Provider? Pt was seen in the ED on 12/11/22 for   an UTI and was asked to follow up with PCP in 7 days. No appts were avail   during this timeframe.  Please return call regarding.   ---------------------------------------------------------------------------  --------------  Kylah DUMONT  [phone]; OK to leave message on voicemail  ---------------------------------------------------------------------------  --------------  SCRIPT ANSWERS  COVID Screen: Carlito Concepcion

## 2022-12-15 LAB
ORGANISM: ABNORMAL
ORGANISM: ABNORMAL
URINE CULTURE, ROUTINE: ABNORMAL
URINE CULTURE, ROUTINE: ABNORMAL

## 2022-12-16 ENCOUNTER — TELEPHONE (OUTPATIENT)
Dept: CARDIOLOGY CLINIC | Age: 77
End: 2022-12-16

## 2022-12-16 ENCOUNTER — OFFICE VISIT (OUTPATIENT)
Dept: CARDIOLOGY CLINIC | Age: 77
End: 2022-12-16
Payer: MEDICARE

## 2022-12-16 VITALS
WEIGHT: 226 LBS | HEIGHT: 64 IN | SYSTOLIC BLOOD PRESSURE: 138 MMHG | HEART RATE: 84 BPM | BODY MASS INDEX: 38.58 KG/M2 | OXYGEN SATURATION: 99 % | DIASTOLIC BLOOD PRESSURE: 80 MMHG

## 2022-12-16 DIAGNOSIS — I48.19 PERSISTENT ATRIAL FIBRILLATION (HCC): ICD-10-CM

## 2022-12-16 DIAGNOSIS — J44.9 COPD, SEVERE (HCC): ICD-10-CM

## 2022-12-16 DIAGNOSIS — I25.10 CORONARY ARTERY DISEASE INVOLVING NATIVE CORONARY ARTERY OF NATIVE HEART WITHOUT ANGINA PECTORIS: ICD-10-CM

## 2022-12-16 DIAGNOSIS — I10 ESSENTIAL HYPERTENSION: ICD-10-CM

## 2022-12-16 DIAGNOSIS — J96.10 CHRONIC RESPIRATORY FAILURE, UNSPECIFIED WHETHER WITH HYPOXIA OR HYPERCAPNIA (HCC): ICD-10-CM

## 2022-12-16 DIAGNOSIS — D64.9 CHRONIC ANEMIA: ICD-10-CM

## 2022-12-16 DIAGNOSIS — G47.33 SLEEP APNEA, OBSTRUCTIVE: ICD-10-CM

## 2022-12-16 DIAGNOSIS — R01.1 SYSTOLIC MURMUR: ICD-10-CM

## 2022-12-16 DIAGNOSIS — I50.33 ACUTE ON CHRONIC DIASTOLIC CONGESTIVE HEART FAILURE (HCC): Primary | ICD-10-CM

## 2022-12-16 PROCEDURE — G8417 CALC BMI ABV UP PARAM F/U: HCPCS | Performed by: INTERNAL MEDICINE

## 2022-12-16 PROCEDURE — 1123F ACP DISCUSS/DSCN MKR DOCD: CPT | Performed by: INTERNAL MEDICINE

## 2022-12-16 PROCEDURE — G8484 FLU IMMUNIZE NO ADMIN: HCPCS | Performed by: INTERNAL MEDICINE

## 2022-12-16 PROCEDURE — 3023F SPIROM DOC REV: CPT | Performed by: INTERNAL MEDICINE

## 2022-12-16 PROCEDURE — 3078F DIAST BP <80 MM HG: CPT | Performed by: INTERNAL MEDICINE

## 2022-12-16 PROCEDURE — G8399 PT W/DXA RESULTS DOCUMENT: HCPCS | Performed by: INTERNAL MEDICINE

## 2022-12-16 PROCEDURE — 1036F TOBACCO NON-USER: CPT | Performed by: INTERNAL MEDICINE

## 2022-12-16 PROCEDURE — 99214 OFFICE O/P EST MOD 30 MIN: CPT | Performed by: INTERNAL MEDICINE

## 2022-12-16 PROCEDURE — 3074F SYST BP LT 130 MM HG: CPT | Performed by: INTERNAL MEDICINE

## 2022-12-16 PROCEDURE — 1090F PRES/ABSN URINE INCON ASSESS: CPT | Performed by: INTERNAL MEDICINE

## 2022-12-16 PROCEDURE — G8427 DOCREV CUR MEDS BY ELIG CLIN: HCPCS | Performed by: INTERNAL MEDICINE

## 2022-12-16 RX ORDER — METOLAZONE 2.5 MG/1
TABLET ORAL
Qty: 30 TABLET | Refills: 0 | Status: SHIPPED | OUTPATIENT
Start: 2022-12-16

## 2022-12-16 RX ORDER — ISOSORBIDE MONONITRATE 60 MG/1
120 TABLET, EXTENDED RELEASE ORAL DAILY
Qty: 180 TABLET | Refills: 3 | Status: SHIPPED | OUTPATIENT
Start: 2022-12-16

## 2022-12-16 NOTE — TELEPHONE ENCOUNTER
Notified patient to take Zaroxolyn starting tomorrow through Monday only ( is at pharmacy picking up medication now). Call on Tuesday with an update and further instructions. Also, Dr. Sherry Randolph states, if she will be on Zaroxoly for a longer period of time, we can have her come to the office for an EKG.

## 2022-12-16 NOTE — TELEPHONE ENCOUNTER
I want patient to take Zaroxolyn over the weekend as well as on Monday. Hopefully she will not need Zaroxolyn on a long-term basis. 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

## 2022-12-16 NOTE — PATIENT INSTRUCTIONS
1) Continue daily CardioMEMS pillow download for our review. Call with any status changes. 2) Continue Lasix/furosemide 60 mg each morning and 40 mg each afternoon   3) Start Zaroxolyn/metolazone 2.5 mg daily 30 minutes prior to the morning dose of Lasix/furosemide daily. 4) Today only-take zaroxolyn/metolazone 2.5 mg, 30 minutes prior to this afternoon dose of Lasix. 5) Complete labs/blood work on Monday 12/19/22-orders given. 6) Follow up with Dr. Marybeth Benjamin Friday 12/23/22.

## 2022-12-16 NOTE — TELEPHONE ENCOUNTER
Fela Schroeder from [employer] called in with medication conflict between:   metOLazone (ZAROXOLYN) 2.5 MG tablet and   dofetilide (TIKOSYN) 250 MCG capsule . Please advise.

## 2022-12-16 NOTE — PROGRESS NOTES
[facility]  Office Visit Progress Note    Nadira Riley  [birth year]    December 16, 2022    CC: \"I am feeling more SOB. \"     HPI:  The patient is 69 y.o. female with a past medical history significant for CAD, atrial fib, HTN, aortic aneurysm and COPD who is here for follow up. Admitted 1/7/2018-1/12/2018  With SOB and chest pain and dx with acute on chronic diastolic HF (diuresed), and atrial fib. Troponins elevated, testing pended due to sepsis from the flu. Outpatient stress testing positive and she underwent cardiac cath on 3/2/18 which revealed  of the RCA and nonobstructive CAD of the LAD and LCX. She also underwent endovascular AAA repair on 3/21/18 by Dr. Osbaldo Jacobsen. Patient was  in the hospital and had 2/25/20 for atrial fibrillation along with signs and symptoms of heart failure. She converted to sinus rhythm after she was started on amiodarone therapy. Continued management per Dr. Jonathan Ryan. S/p PVI ablation 10/2020, she then had Afib ablation last year but had recurrence of A fib She underwent DCCV 1/12/21,  repeat AFIB ablation 2/17/21. continue Xarelto,  Toprol-XL. Flecainide discontinued 5/18/21 with plans for 30 day Event monitor at his f/u with Demetria Boston CNP. Admitted 9-9-21 to 9-12-21 acute on chronic diastolic heart failure, pleural effusion and AFIB. DC weight 221#. She continues with off/on SOB per patient's report. Admitted 5/24-5/26/22 atrial fibrillation for dofetilide loading after having multiple ablations, cardioversions (last ablation 2/4/22 ,CV 4/2022) and failing flecainide therapy. She was started on 250mcg of dofetilide Q12H. Her QTc was monitored closely without any significant changes or need for dose adjustment. Electrolytes also monitored daily and replaced if needed. She did convert to sinus rhythm on 5/25/22 with improvement in symptoms. QTc remains acceptable today.  She was discharged on 250mcg of dofetilide Q12H with a BMP in 1 week.     Presented to [facility] 7/8 with worsening SOB and admitted for acute on chronic diastolic heart failure. Started on IV lasix and diuresed. Developed MARK. Diuresed 228 lbs. DC weight total 4L    Readmitted 7/26/22-8/1/22 worsening SOB and cough prior 3 days, weakness admitted COPD exacerbation/PNA/MARK/acute on chronic dCHF. Prsents today in hospital follow up and to discuss candidacy for CardioMEMS heart failure device implant and ischemic workup. 8/5/22 office visit, She is on 2L per [place] since discharge from the hospital. She notes SOB but denies any other cardiac symptoms. Abnormal Lexiscan followed by  [facility] on 9/20/22 per Dr. Ann Seo. HFU 11/2-11/8/22 presenting with sudden increased SOB. Chronic 4L oxygen therapy but her O2 levels were dropping into the 60%. She was admitted for acute on chronic hypoxic respiratory failure. One episodes of rapid atrial fibrillation, symptomatic, started on cardizem and converted. [address] 11/4/22 revealed she was in acute on chronic diastolic heart failure. NYHA class II, stage C. Her oxygen demand improved  and oxygen was weaned to 2-2.5L. Her uncontrolled blood pressure, and recurrent atrial fibrillation playing a role in her CHF recurrences  . Today, she is accompanied by her  in follow up from [address] [place] [address] HF implant 11/28/22. We have been following her daily PAD/PA mean readings. Today, PAD 34, PA mean 53, goal PAD 20, range 15-25. They report increase in weight and SOB. They are taking Lasix 60 mg a.m. 40 mg p.m. repeat labs 12/11/22. Patient denies exertional chest pain/pressure, dyspnea at rest, LI, PND, orthopnea, palpitations, lightheadedness, weight changes, changes in LE edema, and syncope. Review of Systems:  Constitutional: Denies falls, night sweats or fever. Fatigue improved. HEENT: Denies new visual changes, ringing in ears, nosebleeds, nasal congestion  Respiratory: + SOBE on oxygen therapy.    Cardiovascular: see HPI  GI: Denies N/V, diarrhea, constipation, abdominal pain, change in bowel habits, melena or hematochezia  : Denies urinary frequency, urgency, incontinence, hematuria or dysuria. Skin: Denies rash, hives, or cyanosis  Musculoskeletal: Denies joint or muscle aches/pain  Neurological: Denies syncope or TIA-like symptoms. Psychiatric: Denies anxiety or depression, negative insomnia     Past Medical History:   Diagnosis Date    AAA (abdominal aortic aneurysm)     pt states it is 4cm    AAA (abdominal aortic aneurysm) without rupture 2/10/2015    Atrial fibrillation (HCC)     CAD (coronary artery disease)     CHF (congestive heart failure) (HCC)     COPD (chronic obstructive pulmonary disease) ([facility] [address]     History of blood clots     Hyperlipidemia     Hypertension      Past Surgical History:   Procedure Laterality Date    ABDOMINAL AORTIC ANEURYSM REPAIR      Endovascular abdominal AA    APPENDECTOMY      incidental    BLADDER SUSPENSION      CARDIAC CATHETERIZATION Right 2022    Cardiomems PA sensor device implant Left PA    CARDIAC SURGERY      CATARACT REMOVAL      CHOLECYSTECTOMY  10/15/2013    COLONOSCOPY  2007    dr Magdalena Baeza and check in 5 years. COLONOSCOPY  2017    ok dr arndt, repeat 5 years    HYSTERECTOMY ([facility]      for benign tumor.  just the uterus    JOINT REPLACEMENT  2013    right knee replacement    TONSILLECTOMY  as a child    TUMOR EXCISION      benign behind right ear about      Family History   Problem Relation Age of Onset    Heart Disease Father     Hypertension Other      Social History     Tobacco Use    Smoking status: Former     Packs/day: 1.00     Years: 37.00     Pack years: 37.00     Types: Cigarettes     Start date: 1976     Quit date: 2013     Years since quittin.2     Passive exposure: Past    Smokeless tobacco: Never    Tobacco comments:     E cigarette now and then   Vaping Use    Vaping Use: Former    Quit date: 2021    Substances: Nicotine   Substance Use Topics    Alcohol use: No    Drug use: No       Allergies   Allergen Reactions    Morphine Rash     rash     Current Outpatient Medications   Medication Sig Dispense Refill    rosuvastatin (CRESTOR) 20 MG tablet Take 1 tablet by mouth daily 90 tablet 3    dilTIAZem (CARDIZEM CD) 120 MG extended release capsule TAKE ONE CAPSULE BY MOUTH EVERY MORNING AND TAKE ONE CAPSULE BY MOUTH EVERY NIGHT AT BEDTIME 180 capsule 1    hydrALAZINE (APRESOLINE) 50 MG tablet TAKE ONE TABLET BY MOUTH EVERY 8 HOURS 270 tablet 1    potassium chloride (KLOR-CON M) 20 MEQ extended release tablet TAKE 1 TABLET BY MOUTH 2 TIMES A DAY WITH MEALS 180 tablet 1    cefUROXime (CEFTIN) 500 MG tablet Take 1 tablet by mouth 2 times daily for 7 days 14 tablet 0    isosorbide mononitrate (IMDUR) 60 MG extended release tablet TAKE ONE TABLET BY MOUTH ONE TIME A DAY WITH LUNCH 90 tablet 1    rivaroxaban (XARELTO) 20 MG TABS tablet TAKE ONE TABLET BY MOUTH DAILY WITH BREAKFAST 30 tablet 11    furosemide (LASIX) 20 MG tablet Lasix 60mg in AM and 40mg in  tablet 3    dofetilide (TIKOSYN) 250 MCG capsule TAKE ONE CAPSULE BY MOUTH EVERY 12 HOURS 180 capsule 3    albuterol (PROVENTIL) (2.5 MG/3ML) 0.083% nebulizer solution Take 2.5 mg by nebulization every 4 hours as needed for Wheezing      clopidogrel (PLAVIX) 75 MG tablet Take 1 tablet by mouth daily 30 tablet 3    SYMBICORT 160-4.5 MCG/ACT AERO Inhale 2 puffs into the lungs in the morning and 2 puffs before bedtime. 1 each 11    vitamin C (ASCORBIC ACID) 500 MG tablet Take 500 mg by mouth daily      albuterol sulfate  (90 Base) MCG/ACT inhaler INHALE TWO PUFFS BY MOUTH EVERY 4 HOURS AS NEEDED FOR WHEEZING OR FOR SHORTNESS OF BREATH 1 each 3    Multiple Vitamins-Minerals (MULTI FOR HER 50+ PO) Take 1 tablet by mouth daily       No current facility-administered medications for this visit.        Physical Exam:   /80   Pulse 84   Ht 5' 4\" (1.626 m)   Wt 226 lb (102.5 kg)   SpO2 99%   BMI 38.79 kg/m²   Wt Readings from Last 2 Encounters:   12/16/22 226 lb (102.5 kg)   12/12/22 222 lb (100.7 kg)     Physical Exam:   Constitutional: The patient is oriented to person, place, and time. Appears well-developed and well-nourished. In no acute distress. Head: Normocephalic and atraumatic. Pupils equal and round. Neck: Neck supple. No JVP or carotid bruit appreciated. No mass and no thyromegaly present. No lymphadenopathy present. Cardiovascular: Normal rate and regular rhythm. Normal heart sounds. Exam reveals no gallop and no friction rub. 7-2/3 systolic murmur at the base of her heart. Pulmonary/Chest: Effort normal.  No respiratory distress. No wheezes, no rhonchi,  + diminished breath sounds and + bibasilar crackles. Abdominal: Soft, non-tender. Bowel sounds are normal. Exhibits no organomegaly, mass or bruit. Extremities: no BLE edema. No cyanosis or clubbing. Pulses are 2+ radial and carotid bilaterally. Neurological: No gross cranial nerve deficit. Coordination normal.   Skin: Skin is warm and dry. There is no rash or diaphoresis. Psychiatric: Patient has a normal mood and affect.  Speech is normal and behavior is normal.     Lab Review:   Lab Results   Component Value Date/Time    TRIG 59 09/10/2021 07:22 AM    HDL 56 09/10/2021 07:22 AM    HDL 38 09/09/2011 03:35 PM    LDLCALC 39 09/10/2021 07:22 AM    LDLDIRECT 111 09/06/2012 11:32 AM    LABVLDL 12 09/10/2021 07:22 AM     Lab Results   Component Value Date/Time     12/11/2022 06:55 PM    K 3.7 12/11/2022 06:55 PM    K 3.8 11/05/2022 04:46 AM    CL 91 12/11/2022 06:55 PM    CO2 35 12/11/2022 06:55 PM    BUN 24 12/11/2022 06:55 PM    CREATININE 1.4 12/11/2022 06:55 PM    GLUCOSE 98 12/11/2022 06:55 PM    GLUCOSE 102 07/14/2016 01:03 PM    CALCIUM 9.5 12/11/2022 06:55 PM      Lab Results   Component Value Date    WBC 15.8 (H) 12/11/2022    HGB 9.1 (L) 12/11/2022    HCT 29.2 (L) 12/11/2022    MCV 88.7 2022     2022       EK2018: Sinus rhythm with old anterior infarct  18: Sinus Rhythm, PACs, Old anterior infarct. 19: Sinus Rhythm  20: Sinus  Rhythm  - occasional PAC, # PACs = 1, -Anteroseptal infarct -age undetermined.    -Nonspecific ST depression  -Nondiagnostic. 21: Sinus rhythm  -First degree A-V block , -Anteroseptal infarct -age undetermined. 22: SR   : controlled atrial flutter  22 not completed     Echo 2018:  Mild concentric left ventricular hypertrophy. Visually estimated ejection fraction of 65%. Mitral annular calcification. Mild left atrial dilation. Mild aortic stenosis. (mean gradients [id number])  The systolic pulmonary artery pressure (SPAP) estimated at 30 mmHg  (estimated RA pressure of 8 mmHg included). [facility]: ([facility] 2017   of RCA chronic LAD 10-20% RA 4 PA24/9 16 wedge 9 LVEDP markedly elevated 30    Carotid US 10/2017 at [address]:  1. Estimated diameter reduction of the right internal carotid artery is  less than 50%. 2.  Estimated diameter reduction of the left internal carotid artery is  50-69%. 3.  The bilateral common carotid arteries reveal no evidence of   significant stenosis. 4.  There is greater than 50% stenosis of the right external carotid  artery. 5.  There is no evidence of significant stenosis in the left external  carotid artery. 6.  The bilateral vertebral arteries are patent with antegrade flow. 7.  The bilateral subclavian arteries reveal no evidence of significant  stenosis. 8.  There has been no significant change from the previous study of  2017. Lexiscan 18   Summary    Abnormal study. There is a moderate sized, mild intensity, partially    reversible defect of the mid to apical anterior and mid anterolateral walls    which is consistent with ischemia. There is breast attenuation but stress    images appear worse. Normal LV size and systolic function.     Overall findings represent an intermediate risk study     Cardiac Cath 3/5/18  OVERALL IMPRESSION  1. Again, 100% proximal right coronary artery occlusion with left to right  collaterals. 2.  Mild disease of the distal left main trunk. 3.  20% to 30% ostial left anterior descending artery stenosis with  collaterals from LAD to the right coronary artery. 4.  30% to 40% stenosis of the proximal circumflex artery. 5.  Normal left ventricular systolic function with estimated EF of 55% to  60%. 6.  Slightly enlarged aortic root with no evidence of aortic stenosis or  regurgitation. In view of the above findings, we will okay the patient for her upcoming  aortic aneurysm surgery from cardiac standpoint. ECHO 1/2/20  There is moderate concentric left ventricular hypertrophy. Patient appears to be in atrial fibrillation. Ejection fraction is estimated to be 50-60%. Indeterminate diastolic function. Mild aortic regurgitation. Mild tricuspid regurgitation. Mild mitral regurgitation with mitral annular calcification    Right Heart Cath 2/28/2020  1.  _____ normal right cardiac pressure. 2.  Elevated pulmonary capillary wedge pressure with a mean pulmonary  capillary wedge of 29 mmHg. 3.  Normal cardiac output and indices. 4.  No oxygen step off to suggest ASD/PFO. In view of the above findings, we will start the patient on a small dose  of diuretic therapy and see whether we need to adjust some of her  antihypertensive medicines or not. Her findings are consistent with a  diastolic heart failure. Stress test: 9/6/22      Abnormal myocardial perfusion study    Moderate sized moderate intensity mid to distal anterior, chad-lateral    reversible defect suggestive of ischemia    Reversible mid to base inferior wall defect    Possible suggestion of 2 V region of ischemia    Overall findings represent a intermediate-high risk study. Suggest [facility]     Cath: 9/20/22  OVERALL IMPRESSION:  1.   Mildly elevated right heart pressures. 2.  Normal cardiac output and indices. 3.  No oxygen step-up to suggest ASD or PFO. 4.  Patent left main trunk with mild disease. 5.  Tortuous LAD with mild osteal disease, not hemodynamically  significant. 6.  75% stenosis of the proximal circumflex artery with some mild  disease of the obtuse marginal branch, not hemodynamically significant. 7.  Left-to-right collaterals. 8.  100% occlusion of mid RCA. 9.  Normal left ventricular systolic function with preserved EF of 65%  to 70%. Cath: 9/20/22 [place]    Findings:  Left Main  Distal non obstructive 30% disease, evaluated by IVUS MLA > 7.5 mm2   LAD  Moderate diffuse disease   Circ  Proximal vessel 75-80% stenosis correlating with positive functional study   Mid vessel non-obstructive disease 50%   RCA   with L-R collaterals         Interventions/Vessels  PCI performed to the proximal Lcx with DESx2   Resolute Mckay 3.5x22 mm   Resolute Mckay 3. 5x8 mm   Excellent post angiographic result with BALWINDER 3 flow   Guides/Wires  XB 3.5 guide catheter   Terumo RunThrough   Balanced Heavy Weight   Devices  Opticross boston IVUS   Post % Stenosis  0   Closure Device  Perclose R CFA   Complications  None      Conclusion:   Successful PCI of proximal Lcx with DESx2  Plavix loaded in cath lab  Femoral artery perclosed with excellent hemostasis  3H bedrest  Home this evening, follow up in 1-2 weeks    Echo: 11/3/22   Borderline conc. LVH with normal wall motion; EF 70%. Grade II diastolic dysfunction with elevated LH filling pressures. The left atrium is severely dilated. The right ventricle is normal in size and function. Mild mitral, aortic and tricuspid regurgitation.        [facility] 11/4/22  Findings:  RA  5   RV  53/9   PA  49/20 (28)   PCWP  25   skilled nursing  3.8    Conclusion: Post capillary pulmonary hypertension, Recommend continued diuresis     Assessment/Plan:       Chronic diastolic heart failure  -No aldactone secondary to elevated Cr.   -as of 9/29/22 she had been admitted x2 last 1 month to [facility]  -referred for consideration candidacy for CardioMEMS HF implant  -she needed out patient ischemic workup--9/6/22 abnormal  Lexiscan myoview followed by a   -[facility] 9/20/22 s/p successful PC proximal Lcx with D#ES x2. LM 30% disease, LAD moderate diffuse dx, mid vessal 50% ,  with L-R collaterals of the RCA. -Today, she reports worsening LI, weight gain  -11/28/22 s/p CardioMEMS HF implant   -following PAD/PA mean daiy  -today PAD 34, PA mean 53. Goal PAD 20, range 15-25.   -Currently on isosorbide, tikosyn, diltiazem,  hydralazine, Imdur.   -On Lasix, KCL. -We will consider SGLT2 inhibitor therapy in outpatient setting.  -Encouraged medication compliance, low salt diet with hx of indiscretions, compression stockings and elevating legs. Physical exam - + diminished breath sounds and + bibasilar crackles. Start Zaroxolyn 2.5 mg daily (today dose prior to evening dose Lasix, then start daily each morning 30 minutes prior to Lasix)  Increase Imdur from 60 mg to 120 mg daily   BMP, BNP, CBC Monday     NYHA class 3. ACC/AHA stage 3      Coronary artery disease involving native coronary artery of native heart without angina pectoris  -She previously reported myalgias with Crestor with resolution of cramping after discontinuing. -[facility] 9/20/22 s/p PCI of proximal Lcx with DESx2 by Dr. Osiel West  -[address] 11/4/22 acute on chronic diastolic heart failure (with acute on chronic respiratory failure)  -also episode of symptomatic AFIB during hospitalization.     -denies any angina today, continues with chronic LI.   -continues to wear 3-4L of oxygen   -remain on triple therapy; Plavix, aspirin, rosuvastatin  -will have her stop aspirin in 1 month (10/20/22) due to reports of epistaxis  Remains on Plavix. Essential hypertension  -Blood pressure is stable today on medical therapy. Encouraged on low salt diet.      Persistent atrial fibrillation   -CHADS VASC score 7 for age, gender, DVT, CHF, HTN, CAD.   -Managed per Dr. Renard Mansfield. S/p PVI ablation 10/2020, she then had Afib ablation last year but had recurrence of A fib She underwent DCCV 1/12/21,  repeat AFIB ablation 2/17/21, DCCV 4/2022.   -5/24/22 admitted for dofetilide loading after having multiple ablations, cardioversions and failing flecainide therapy. She was started on 250mcg of dofetilide Q12H. Her QTc was monitored closely without any significant changes or need for dose adjustment. Electrolytes also monitored daily and replaced if needed. She did convert to sinus rhythm on 5/25/22 with improvement in symptoms. QTc remains acceptable today. She will be discharged on 250mcg of dofetilide Q12H with a BMP in 1 week.     -11/2022 admitted for acute on chronic respiratory failure, acute on chronic diastolic heart failure went into symptomatic rapid atrial fibrillation. -improvement in overall feeling, LI, sleeping nightly  -remains oxygen therapy per [place] 3-4L   -Xarelto for [address] [place] [address]. -normal rate and rhythm per physical exam.    Systolic murmur  Echo reviewed 11/2/22. Severe COPD/Asthma/chronic respiratory failure  managed per Dr. Neftali Amaro. Keep f/u with pulmonology. AAA (abdominal aortic aneurysm) without rupture   -Underwent endovascular AAA repair on 3/21/18 by Dr. Lindsey Talley. Follows vascular surgery    Sleep apnea  -Intolerant to CPAP. Anemia with hx of Epistaxis  -(Plavix and Xarelto) for CAD with recent stent placement and Afib.   -may stop aspirin on 10/20/22. -CBC with next blood draw. Instructed to obtain flu vaccine this season, annually and obtain COVID-19 vaccine per guidelines. 1 week f/u     Thank you very much for allowing me to participate in the care of your patient. Please do not hesitate to contact me if you have any questions.     Sincerely,  Mani Larsen MD      [facility], [address] [place] [address]  Ph: [phone]  Fax: [phone]      This note was scribed in the presence of Dr. Dre Cutler MD by Ranulfo Lee RN.

## 2022-12-16 NOTE — TELEPHONE ENCOUNTER
Dr. Mar Zamroa     Received call from pharmacy that there is an interaction between metolazone and dofetilide prescribed by Dr. Heena Jane. Please advise further, thanks. Self

## 2022-12-16 NOTE — TELEPHONE ENCOUNTER
Pharmacy advised to fill both medications as Metolazone will be utilized as prn only    Will call to reiterate to pt and  for further education regarding how to take medication

## 2022-12-19 RX ORDER — CLOPIDOGREL BISULFATE 75 MG/1
TABLET ORAL
Qty: 30 TABLET | Refills: 3 | Status: SHIPPED | OUTPATIENT
Start: 2022-12-19

## 2022-12-20 ENCOUNTER — OFFICE VISIT (OUTPATIENT)
Dept: FAMILY MEDICINE CLINIC | Age: 77
End: 2022-12-20
Payer: MEDICARE

## 2022-12-20 VITALS
BODY MASS INDEX: 37.56 KG/M2 | SYSTOLIC BLOOD PRESSURE: 122 MMHG | HEIGHT: 64 IN | TEMPERATURE: 97.2 F | WEIGHT: 220 LBS | DIASTOLIC BLOOD PRESSURE: 74 MMHG

## 2022-12-20 DIAGNOSIS — N30.00 ACUTE CYSTITIS WITHOUT HEMATURIA: Primary | ICD-10-CM

## 2022-12-20 DIAGNOSIS — I50.33 ACUTE ON CHRONIC DIASTOLIC CONGESTIVE HEART FAILURE (HCC): ICD-10-CM

## 2022-12-20 DIAGNOSIS — D64.9 CHRONIC ANEMIA: ICD-10-CM

## 2022-12-20 DIAGNOSIS — I10 ESSENTIAL HYPERTENSION: ICD-10-CM

## 2022-12-20 LAB
ANION GAP SERPL CALCULATED.3IONS-SCNC: 13 MMOL/L (ref 3–16)
BUN BLDV-MCNC: 36 MG/DL (ref 7–20)
CALCIUM SERPL-MCNC: 10 MG/DL (ref 8.3–10.6)
CHLORIDE BLD-SCNC: 88 MMOL/L (ref 99–110)
CO2: 37 MMOL/L (ref 21–32)
CREAT SERPL-MCNC: 1.6 MG/DL (ref 0.6–1.2)
GFR SERPL CREATININE-BSD FRML MDRD: 33 ML/MIN/{1.73_M2}
GLUCOSE BLD-MCNC: 107 MG/DL (ref 70–99)
HCT VFR BLD CALC: 28.6 % (ref 36–48)
HEMOGLOBIN: 9.2 G/DL (ref 12–16)
MCH RBC QN AUTO: 27.9 PG (ref 26–34)
MCHC RBC AUTO-ENTMCNC: 32.1 G/DL (ref 31–36)
MCV RBC AUTO: 87.1 FL (ref 80–100)
PDW BLD-RTO: 16.6 % (ref 12.4–15.4)
PLATELET # BLD: 257 K/UL (ref 135–450)
PMV BLD AUTO: 9.8 FL (ref 5–10.5)
POTASSIUM SERPL-SCNC: 3.7 MMOL/L (ref 3.5–5.1)
PRO-BNP: 1235 PG/ML (ref 0–449)
RBC # BLD: 3.29 M/UL (ref 4–5.2)
SODIUM BLD-SCNC: 138 MMOL/L (ref 136–145)
WBC # BLD: 12.3 K/UL (ref 4–11)

## 2022-12-20 PROCEDURE — 1123F ACP DISCUSS/DSCN MKR DOCD: CPT | Performed by: FAMILY MEDICINE

## 2022-12-20 PROCEDURE — 1090F PRES/ABSN URINE INCON ASSESS: CPT | Performed by: FAMILY MEDICINE

## 2022-12-20 PROCEDURE — G8484 FLU IMMUNIZE NO ADMIN: HCPCS | Performed by: FAMILY MEDICINE

## 2022-12-20 PROCEDURE — G8417 CALC BMI ABV UP PARAM F/U: HCPCS | Performed by: FAMILY MEDICINE

## 2022-12-20 PROCEDURE — 3078F DIAST BP <80 MM HG: CPT | Performed by: FAMILY MEDICINE

## 2022-12-20 PROCEDURE — 3074F SYST BP LT 130 MM HG: CPT | Performed by: FAMILY MEDICINE

## 2022-12-20 PROCEDURE — G8427 DOCREV CUR MEDS BY ELIG CLIN: HCPCS | Performed by: FAMILY MEDICINE

## 2022-12-20 PROCEDURE — G8399 PT W/DXA RESULTS DOCUMENT: HCPCS | Performed by: FAMILY MEDICINE

## 2022-12-20 PROCEDURE — 99213 OFFICE O/P EST LOW 20 MIN: CPT | Performed by: FAMILY MEDICINE

## 2022-12-20 PROCEDURE — 1036F TOBACCO NON-USER: CPT | Performed by: FAMILY MEDICINE

## 2022-12-20 RX ORDER — CEFUROXIME AXETIL 250 MG/1
250 TABLET ORAL 2 TIMES DAILY
Qty: 6 TABLET | Refills: 0 | Status: SHIPPED | OUTPATIENT
Start: 2022-12-20 | End: 2022-12-23

## 2022-12-20 RX ORDER — FLUCONAZOLE 150 MG/1
150 TABLET ORAL ONCE
Qty: 1 TABLET | Refills: 0 | Status: SHIPPED | OUTPATIENT
Start: 2022-12-20 | End: 2022-12-20

## 2022-12-20 NOTE — PATIENT INSTRUCTIONS
Do take fluids  Use the antibiotic  If diarrhea worsens call me   I am adding a single antifungal pill to take after you are done with the additional treatment  See us back in about 2 months

## 2022-12-20 NOTE — PROGRESS NOTES
Subjective:      Patient ID: hSun Crump is a 69 y.o. female. Chief Complaint   Patient presents with    Follow-Up from [facility] 12- \"UTI\"        Patient presents with: Follow-Up from Hospital: [facility] 12- \"UTI\"    Here for the above  She is doing better  She is here with her   No blood no pain no abd pain no unusual back pain  No fever  No v   She did have some loose stool but getting better     YOB: 1945    Date of Visit:  12/20/2022     -- Morphine -- Rash    --  rash    Current Outpatient Medications:  clopidogrel (PLAVIX) 75 MG tablet, TAKE ONE TABLET BY MOUTH DAILY, Disp: 30 tablet, Rfl: 3  isosorbide mononitrate (IMDUR) 60 MG extended release tablet, Take 2 tablets by mouth daily, Disp: 180 tablet, Rfl: 3  metOLazone (ZAROXOLYN) 2.5 MG tablet, Take 30 minutes prior to Lasix/furosemide morning dose daily. , Disp: 30 tablet, Rfl: 0  rosuvastatin (CRESTOR) 20 MG tablet, Take 1 tablet by mouth daily, Disp: 90 tablet, Rfl: 3  dilTIAZem (CARDIZEM CD) 120 MG extended release capsule, TAKE ONE CAPSULE BY MOUTH EVERY MORNING AND TAKE ONE CAPSULE BY MOUTH EVERY NIGHT AT BEDTIME, Disp: 180 capsule, Rfl: 1  hydrALAZINE (APRESOLINE) 50 MG tablet, TAKE ONE TABLET BY MOUTH EVERY 8 HOURS, Disp: 270 tablet, Rfl: 1  potassium chloride (KLOR-CON M) 20 MEQ extended release tablet, TAKE 1 TABLET BY MOUTH 2 TIMES A DAY WITH MEALS, Disp: 180 tablet, Rfl: 1  rivaroxaban (XARELTO) 20 MG TABS tablet, TAKE ONE TABLET BY MOUTH DAILY WITH BREAKFAST, Disp: 30 tablet, Rfl: 11  furosemide (LASIX) 20 MG tablet, Lasix 60mg in AM and 40mg in PM, Disp: 140 tablet, Rfl: 3  dofetilide (TIKOSYN) 250 MCG capsule, TAKE ONE CAPSULE BY MOUTH EVERY 12 HOURS, Disp: 180 capsule, Rfl: 3  albuterol (PROVENTIL) (2.5 MG/3ML) 0.083% nebulizer solution, Take 2.5 mg by nebulization every 4 hours as needed for Wheezing, Disp: , Rfl:   SYMBICORT 160-4.5 MCG/ACT AERO, Inhale 2 puffs into the lungs in the morning and 2 puffs before bedtime. , Disp: 1 each, Rfl: 11  vitamin C (ASCORBIC ACID) 500 MG tablet, Take 500 mg by mouth daily, Disp: , Rfl:   albuterol sulfate  (90 Base) MCG/ACT inhaler, INHALE TWO PUFFS BY MOUTH EVERY 4 HOURS AS NEEDED FOR WHEEZING OR FOR SHORTNESS OF BREATH, Disp: 1 each, Rfl: 3  Multiple Vitamins-Minerals (MULTI FOR HER 50+ PO), Take 1 tablet by mouth daily, Disp: , Rfl:     No current facility-administered medications for this visit.      ---------------------------               12/20/22                      1119         ---------------------------   BP:          122/74         Site:    Left Upper Arm     Position:     Sitting        Cuff Size:   Large Adult      Temp:   97.2 °F (36.2 °C)   TempSrc:    Temporal        Weight: 220 lb (99.8 kg)    Height:  5' 4\" (1.626 m)   ---------------------------  Body mass index is 37.76 kg/m². Wt Readings from Last 3 Encounters:  12/20/22 : 220 lb (99.8 kg)  12/16/22 : 226 lb (102.5 kg)  12/12/22 : 222 lb (100.7 kg)    BP Readings from Last 3 Encounters:  12/20/22 : 122/74  12/16/22 : 138/80  12/12/22 : 138/78          Review of Systems    Objective:   Physical Exam  Constitutional:       General: She is not in acute distress. Appearance: Normal appearance. She is not ill-appearing. Comments: Here with her  she is using her O2     Pulmonary:      Effort: Pulmonary effort is normal.      Breath sounds: Normal breath sounds. No decreased breath sounds, wheezing, rhonchi or rales. Abdominal:      Palpations: Abdomen is soft. There is no mass. Tenderness: There is no abdominal tenderness. There is no guarding. Skin:     General: Skin is warm and dry. Coloration: Skin is not pale. Neurological:      Mental Status: She is alert. Assessment:       Diagnosis Orders   1.  Acute cystitis without hematuria          Orders Placed This Encounter   Medications    cefUROXime (CEFTIN) 250 MG tablet     Sig: Take 1 tablet by mouth 2 times daily for 3 days     Dispense:  6 tablet     Refill:  0    fluconazole (DIFLUCAN) 150 MG tablet     Sig: Take 1 tablet by mouth once for 1 dose     Dispense:  1 tablet     Refill:  0         The diarrhea is not new she tells me she will get periodically  She also notes a little itch about the gu area no d/c no rash  She had 2 colonies on the urine cx  She took 7 days  I am adding additional 3 days of same med  I reviewed cultures with her and        Plan:      Do take fluids  Use the antibiotic  If diarrhea worsens call me   I am adding a single antifungal pill to take after you are done with the additional treatment        Hunter Ayala MD

## 2022-12-29 ENCOUNTER — NURSE ONLY (OUTPATIENT)
Dept: CARDIOLOGY CLINIC | Age: 77
End: 2022-12-29
Payer: MEDICARE

## 2022-12-29 DIAGNOSIS — I50.22 CHRONIC SYSTOLIC (CONGESTIVE) HEART FAILURE (HCC): Primary | ICD-10-CM

## 2022-12-29 PROCEDURE — 93264 REM MNTR WRLS P-ART PRS SNR: CPT | Performed by: NURSE PRACTITIONER

## 2022-12-29 NOTE — PROGRESS NOTES
I have reviewed, interpreted and responded to the remote patient monitoring date via telephone, [employer] benji and/or Direct Call messaging at least weekly for the last 30 days. Goal PAD : 20  Patient has maintained goal with interventions as documented in patient notes. See previous encounters.

## 2023-01-04 ENCOUNTER — TELEPHONE (OUTPATIENT)
Dept: FAMILY MEDICINE CLINIC | Age: 78
End: 2023-01-04

## 2023-01-04 DIAGNOSIS — N39.0 URINARY TRACT INFECTION WITHOUT HEMATURIA, SITE UNSPECIFIED: Primary | ICD-10-CM

## 2023-01-04 NOTE — TELEPHONE ENCOUNTER
----- Message from Umair Ribeiro sent at 1/4/2023  8:18 AM EST -----  Subject: Message to Provider    QUESTIONS  Information for Provider? patient did not want an appointment , she would   like something for a UTI , please call patient thank you  ---------------------------------------------------------------------------  --------------  Sarahy Crowe INFO  [phone]; OK to leave message on voicemail  ---------------------------------------------------------------------------  --------------  SCRIPT ANSWERS  Relationship to Patient?  Self

## 2023-01-05 RX ORDER — NITROFURANTOIN 25; 75 MG/1; MG/1
100 CAPSULE ORAL 2 TIMES DAILY
Qty: 20 CAPSULE | Refills: 0 | Status: SHIPPED | OUTPATIENT
Start: 2023-01-05 | End: 2023-01-15

## 2023-01-05 NOTE — TELEPHONE ENCOUNTER
Nicolás Lee advised and verbalized understanding. She states she won't get to the lab today because she is not feeling good enough to get out.

## 2023-01-11 RX ORDER — METOLAZONE 2.5 MG/1
TABLET ORAL
Qty: 90 TABLET | Refills: 3 | Status: SHIPPED | OUTPATIENT
Start: 2023-01-11

## 2023-01-23 ENCOUNTER — NURSE ONLY (OUTPATIENT)
Dept: FAMILY MEDICINE CLINIC | Age: 78
End: 2023-01-23

## 2023-01-23 DIAGNOSIS — N39.0 URINARY TRACT INFECTION WITHOUT HEMATURIA, SITE UNSPECIFIED: ICD-10-CM

## 2023-01-23 LAB
BACTERIA: ABNORMAL /HPF
BILIRUBIN URINE: NEGATIVE
BLOOD, URINE: NEGATIVE
CLARITY: CLEAR
COLOR: YELLOW
EPITHELIAL CELLS, UA: 1 /HPF (ref 0–5)
GLUCOSE URINE: NEGATIVE MG/DL
HYALINE CASTS: 0 /LPF (ref 0–8)
KETONES, URINE: NEGATIVE MG/DL
LEUKOCYTE ESTERASE, URINE: ABNORMAL
MICROSCOPIC EXAMINATION: YES
NITRITE, URINE: NEGATIVE
PH UA: 6 (ref 5–8)
PROTEIN UA: ABNORMAL MG/DL
RBC UA: 4 /HPF (ref 0–4)
SPECIFIC GRAVITY UA: 1.01 (ref 1–1.03)
URINE TYPE: ABNORMAL
UROBILINOGEN, URINE: 0.2 E.U./DL
WBC UA: 7 /HPF (ref 0–5)

## 2023-01-23 NOTE — PROGRESS NOTES
Patient brought in urine sample to be tested. She took round of Macrobid and states she is not any better. Per Dr. Javier delcid to run UA with Micro and Urine Culture.

## 2023-01-24 ENCOUNTER — TELEPHONE (OUTPATIENT)
Dept: FAMILY MEDICINE CLINIC | Age: 78
End: 2023-01-24

## 2023-01-24 LAB — URINE CULTURE, ROUTINE: NORMAL

## 2023-01-30 ENCOUNTER — NURSE ONLY (OUTPATIENT)
Dept: CARDIOLOGY | Age: 78
End: 2023-01-30
Payer: MEDICARE

## 2023-01-30 DIAGNOSIS — I50.22 CHRONIC SYSTOLIC (CONGESTIVE) HEART FAILURE (HCC): Primary | ICD-10-CM

## 2023-01-30 PROCEDURE — 93264 REM MNTR WRLS P-ART PRS SNR: CPT | Performed by: NURSE PRACTITIONER

## 2023-02-01 ENCOUNTER — TELEPHONE (OUTPATIENT)
Dept: FAMILY MEDICINE CLINIC | Age: 78
End: 2023-02-01

## 2023-02-01 RX ORDER — FLUCONAZOLE 100 MG/1
100 TABLET ORAL DAILY
Qty: 7 TABLET | Refills: 0 | Status: SHIPPED | OUTPATIENT
Start: 2023-02-01 | End: 2023-02-08

## 2023-02-01 NOTE — TELEPHONE ENCOUNTER
----- Message from Rigo marcelino sent at 2/1/2023 11:48 AM EST -----  Subject: Message to Provider    QUESTIONS  Information for Provider? pt called regarding apt time. ---------------------------------------------------------------------------  --------------  Angelito Gregory DTQ  [phone]; OK to leave message on voicemail  ---------------------------------------------------------------------------  --------------  SCRIPT ANSWERS  Relationship to Patient?  Self

## 2023-02-01 NOTE — TELEPHONE ENCOUNTER
Pt stated that she has a yeast infection, it has been going on for about 2 weeks or more. Pt would like to know if she could be prescribe something for it.      Loretta Velázquez advise [phone] (home)

## 2023-02-13 ENCOUNTER — TELEPHONE (OUTPATIENT)
Dept: PULMONOLOGY | Age: 78
End: 2023-02-13

## 2023-02-13 ENCOUNTER — TELEPHONE (OUTPATIENT)
Dept: CARDIOLOGY CLINIC | Age: 78
End: 2023-02-13

## 2023-02-13 DIAGNOSIS — J44.1 COPD EXACERBATION (HCC): Primary | ICD-10-CM

## 2023-02-13 RX ORDER — PREDNISONE 10 MG/1
TABLET ORAL
Qty: 30 TABLET | Refills: 0 | Status: SHIPPED | OUTPATIENT
Start: 2023-02-13

## 2023-02-13 RX ORDER — METOLAZONE 2.5 MG/1
2.5 TABLET ORAL DAILY
Qty: 3 TABLET | Refills: 0 | OUTPATIENT
Start: 2023-02-13 | End: 2023-02-16

## 2023-02-13 RX ORDER — METOLAZONE 2.5 MG/1
2.5 TABLET ORAL DAILY
Qty: 3 TABLET | Refills: 0 | Status: SHIPPED | OUTPATIENT
Start: 2023-02-13 | End: 2023-02-16

## 2023-02-13 NOTE — TELEPHONE ENCOUNTER
Prednisone sent in.   She has to be cautious because last time this happened it was her heart not lungs

## 2023-02-13 NOTE — TELEPHONE ENCOUNTER
Elevated PAD via cardiomems readings. Note in chart indicates that patient called Dr. Mary Combs D/T increased SOB. I called pt and she reports increased SOB X2-3 days, increased BLE edema. Her wt is stable at 219lbs. She sleeps in a recliner. Reviewed meds, pt and  initially are confused about what medications she takes but at the end of conversation it is revealed that she is not taking metolazone- it is ordered 2.5mg daily. She does not have that medication. Upon further chart review, it appears she took a few doses of metolazone in December but I don't see where that was ordered again. I will send in Rx to take metolazone 2.5mg daily X 3 doses. I will request an appt to see Dr. Jesusita Flores in [facility]. She may benefit from further adjustment on GDMT.

## 2023-02-13 NOTE — TELEPHONE ENCOUNTER
Please schedule an appt with Dr. Edd Wade in [facility] -preferably in next week if possible

## 2023-02-13 NOTE — TELEPHONE ENCOUNTER
Ray Yuan said she wants Dr Taya Granado to call her. She's having SOB for a couple of days now, coughing up clear stuff, no fever or any other symptoms. No OTC. Using nebulizer and inhalers, helping some. She says she has COPD and Dr Taya Granado will call her on her cell phone and give her steroids. Please review. Advised her Dr Taya Granado was in the hospital and that I did not know when he would see this message. Suggested she contact her PCP for an appt, she refused.  Her cell phone is [phone]

## 2023-02-13 NOTE — TELEPHONE ENCOUNTER
[facility] is now scheduled for Friday February 17, 2023 in [place] Normal rate, regular rhythm.  Heart sounds S1, S2.  No murmurs, rubs or gallops.

## 2023-02-17 NOTE — TELEPHONE ENCOUNTER
PAD 27 2/17/23 (goal 20, range 15-25)   She cancelled today's appointment because she couldn't make it  Feels her BLE edema is much improved. Only a little improvement in her SOB but she states she is taking her steroid which she feels helps her SOB. Weight has remained 219# all week per patient. Also updated [email or web address].

## 2023-02-21 ENCOUNTER — OFFICE VISIT (OUTPATIENT)
Dept: PULMONOLOGY | Age: 78
End: 2023-02-21
Payer: MEDICARE

## 2023-02-21 VITALS
HEIGHT: 64 IN | WEIGHT: 220 LBS | RESPIRATION RATE: 21 BRPM | DIASTOLIC BLOOD PRESSURE: 70 MMHG | TEMPERATURE: 96.9 F | SYSTOLIC BLOOD PRESSURE: 115 MMHG | HEART RATE: 59 BPM | OXYGEN SATURATION: 99 % | BODY MASS INDEX: 37.56 KG/M2

## 2023-02-21 DIAGNOSIS — J44.9 COPD, SEVERE (HCC): Primary | ICD-10-CM

## 2023-02-21 DIAGNOSIS — I50.32 CHRONIC DIASTOLIC CHF (CONGESTIVE HEART FAILURE) (HCC): ICD-10-CM

## 2023-02-21 DIAGNOSIS — J96.11 CHRONIC RESPIRATORY FAILURE WITH HYPOXIA (HCC): ICD-10-CM

## 2023-02-21 PROCEDURE — G8417 CALC BMI ABV UP PARAM F/U: HCPCS | Performed by: INTERNAL MEDICINE

## 2023-02-21 PROCEDURE — 3074F SYST BP LT 130 MM HG: CPT | Performed by: INTERNAL MEDICINE

## 2023-02-21 PROCEDURE — 1090F PRES/ABSN URINE INCON ASSESS: CPT | Performed by: INTERNAL MEDICINE

## 2023-02-21 PROCEDURE — 1036F TOBACCO NON-USER: CPT | Performed by: INTERNAL MEDICINE

## 2023-02-21 PROCEDURE — 1123F ACP DISCUSS/DSCN MKR DOCD: CPT | Performed by: INTERNAL MEDICINE

## 2023-02-21 PROCEDURE — G8399 PT W/DXA RESULTS DOCUMENT: HCPCS | Performed by: INTERNAL MEDICINE

## 2023-02-21 PROCEDURE — 99214 OFFICE O/P EST MOD 30 MIN: CPT | Performed by: INTERNAL MEDICINE

## 2023-02-21 PROCEDURE — 3078F DIAST BP <80 MM HG: CPT | Performed by: INTERNAL MEDICINE

## 2023-02-21 PROCEDURE — G8484 FLU IMMUNIZE NO ADMIN: HCPCS | Performed by: INTERNAL MEDICINE

## 2023-02-21 PROCEDURE — 3023F SPIROM DOC REV: CPT | Performed by: INTERNAL MEDICINE

## 2023-02-21 PROCEDURE — G8427 DOCREV CUR MEDS BY ELIG CLIN: HCPCS | Performed by: INTERNAL MEDICINE

## 2023-02-21 RX ORDER — FLUTICASONE FUROATE, UMECLIDINIUM BROMIDE AND VILANTEROL TRIFENATATE 200; 62.5; 25 UG/1; UG/1; UG/1
1 POWDER RESPIRATORY (INHALATION) DAILY
Qty: 1 EACH | Refills: 11 | Status: SHIPPED | OUTPATIENT
Start: 2023-02-21

## 2023-02-21 ASSESSMENT — COPD QUESTIONNAIRES
QUESTION3_CHESTTIGHTNESS: 0
QUESTION6_LEAVINGHOUSE: 3
QUESTION4_WALKINCLINE: 5
QUESTION8_ENERGYLEVEL: 0
QUESTION1_COUGHFREQUENCY: 1
CAT_TOTALSCORE: 22
QUESTION7_SLEEPQUALITY: 4
QUESTION5_HOMEACTIVITIES: 5
QUESTION2_CHESTPHLEGM: 4

## 2023-02-21 NOTE — PATIENT INSTRUCTIONS
Stop symbicort and spiriva    Start trelegy once a day.   Samples x 3    Use albuterol as needed    Follow up 3 in months

## 2023-02-21 NOTE — PROGRESS NOTES
Chief complaint  This is a 69y.o. year old female  who comes to see me with a chief complaint of   Chief Complaint   Patient presents with    Follow-up    COPD         HPI  Here with CC of COPD    Called for prednisone recently due to COPD flare up. She said the prednisone helped and she was convinced this was COPD flare up and not CHF>  She is doing well with weight and fluid retention. Has always had issues with affording symbicort and spiriva. On oxygen up to 4 liters as needed. On albuterol prn too.  is present.      Current Outpatient Medications   Medication Sig Dispense Refill    fluticasone-umeclidin-vilant (TRELEGY ELLIPTA) 200-62.5-25 MCG/ACT AEPB inhaler Inhale 1 puff into the lungs daily 1 each 11    predniSONE (DELTASONE) 10 MG tablet 40mg for 3days 30mg for 3days 20mg for 3days 10mg for 3days 30 tablet 0    clopidogrel (PLAVIX) 75 MG tablet TAKE ONE TABLET BY MOUTH DAILY 30 tablet 3    isosorbide mononitrate (IMDUR) 60 MG extended release tablet Take 2 tablets by mouth daily 180 tablet 3    rosuvastatin (CRESTOR) 20 MG tablet Take 1 tablet by mouth daily 90 tablet 3    dilTIAZem (CARDIZEM CD) 120 MG extended release capsule TAKE ONE CAPSULE BY MOUTH EVERY MORNING AND TAKE ONE CAPSULE BY MOUTH EVERY NIGHT AT BEDTIME 180 capsule 1    hydrALAZINE (APRESOLINE) 50 MG tablet TAKE ONE TABLET BY MOUTH EVERY 8 HOURS 270 tablet 1    potassium chloride (KLOR-CON M) 20 MEQ extended release tablet TAKE 1 TABLET BY MOUTH 2 TIMES A DAY WITH MEALS 180 tablet 1    rivaroxaban (XARELTO) 20 MG TABS tablet TAKE ONE TABLET BY MOUTH DAILY WITH BREAKFAST 30 tablet 11    furosemide (LASIX) 20 MG tablet Lasix 60mg in AM and 40mg in  tablet 3    dofetilide (TIKOSYN) 250 MCG capsule TAKE ONE CAPSULE BY MOUTH EVERY 12 HOURS 180 capsule 3    albuterol (PROVENTIL) (2.5 MG/3ML) 0.083% nebulizer solution Take 2.5 mg by nebulization every 4 hours as needed for Wheezing      SYMBICORT 160-4.5 MCG/ACT AERO Inhale 2 puffs into the lungs in the morning and 2 puffs before bedtime. 1 each 11    vitamin C (ASCORBIC ACID) 500 MG tablet Take 500 mg by mouth daily      albuterol sulfate  (90 Base) MCG/ACT inhaler INHALE TWO PUFFS BY MOUTH EVERY 4 HOURS AS NEEDED FOR WHEEZING OR FOR SHORTNESS OF BREATH 1 each 3    Multiple Vitamins-Minerals (MULTI FOR HER 50+ PO) Take 1 tablet by mouth daily      metOLazone (ZAROXOLYN) 2.5 MG tablet Take 1 tablet by mouth daily for 3 days Take 30 min prior to lasix 3 tablet 0     No current facility-administered medications for this visit. PHYSICAL EXAM:  Vitals:    02/21/23 1236   BP: 115/70   Pulse: 59   Resp: 21   Temp: 96.9 °F (36.1 °C)   SpO2: 99%           PE  GENERAL:  Well nourished, alert  HEENT:  No scleral icterus, no conjunctival irritation  NECK:  No thyromegaly, no bruits  LYMPH:  No cervical or supraclavicular adenopathy  HEART:  Regular rate and rhythm, no murmurs. Distant heart sounds   LUNGS:  slight wheezing   ABDOMEN:  No distention, no organomegaly  EXTREMITIES: + edema, no digital clubbing  NEURO:  No localizing deficits, CN II-XII intact    Pulmonary Function Testing 7/2015  Severe obstruction with no response to bronchodilators  Moderate Restriction  Moderate Reduction in diffusing capacity     Chest imaging from 9/22 is reviewed. My impression is suspicion of emphysema, scarring and small nodules    6 MWT 9/2020  The patient ambulated 122 meters which is abnormal- 34-%   predicted. Her, heart rate response was normal, the blood   pressure response was normal, oxygen saturations were normal.     The patient desaturated to 93% while ambulating on room air. The degree of symptoms based on the Danielle Dyspnea/Fatigue scale   were increased with testing. This test does not indicate the   need for supplemental oxygen.    I reviewed the above labs and images    ABG 6/22:  7.45/45/72     RHC 11/22  RA  5   RV  53/9   PA  49/20 (28)   PCWP  25   [facility]  3.8 Assessment/Plan:  1. COPD, severe ([address]  S/p flare up that was aborted with prednisone burst.  Will change her symbicort and spiriva to trelegy and give samples. Albuterol and/or nebs as needed  - fluticasone-umeclidin-vilant (TRELEGY ELLIPTA) 200-62.5-25 MCG/ACT AEPB inhaler; Inhale 1 puff into the lungs daily  Dispense: 1 each; Refill: 11    2. Chronic respiratory failure with hypoxia (HCC)  Uses up to 4 liters with benefit. Is mobile    3. Chronic diastolic CHF (congestive heart failure) (HCC)  Always a risk for flaring up and hard to delineate when it is CHF vs COPD. Have to watch weights (have been down) to truly determine. Changing to trelegy.         Pulmonary Rehab:  still needs to consider this     Lung Cancer Screening CT:  9/2022    Has follow up in 3 months

## 2023-02-27 ENCOUNTER — TELEPHONE (OUTPATIENT)
Dept: FAMILY MEDICINE CLINIC | Age: 78
End: 2023-02-27

## 2023-02-27 ENCOUNTER — TELEPHONE (OUTPATIENT)
Dept: CARDIOLOGY CLINIC | Age: 78
End: 2023-02-27

## 2023-02-27 RX ORDER — METOLAZONE 5 MG/1
5 TABLET ORAL EVERY OTHER DAY
Qty: 5 TABLET | Refills: 0 | Status: SHIPPED | OUTPATIENT
Start: 2023-02-27 | End: 2023-03-03

## 2023-02-27 NOTE — TELEPHONE ENCOUNTER
CHANDA Chu  I spoke with Azeb Espinoza and BLE same and breathing a little worse since we spoke 10 days ago 2/17/23-Refer to tele encounter from 2/13/23. She confirms that she remains on Lasix 60 mg a.m. and 40 mg p.m. 2/26 215#, no weight today or any prior to that she could provide.

## 2023-02-27 NOTE — TELEPHONE ENCOUNTER
Spoke with pt via triage call  Patient stated both of her feet are swollen. Denies any SOB or chest pain.   Pt did confirm taking 2 Lasix in the pm and in the am.   B/P was 145/73 on 2/26/23    Patient is asking if she should take more Lasix? Please advise.

## 2023-02-27 NOTE — TELEPHONE ENCOUNTER
Early Adriana called in this morning, she states both of her feet are swollen, they have been swollen for three days, she would like to know what she should do. She states she has been taking her lasix as prescribed. She can be reached at [phone].

## 2023-02-27 NOTE — TELEPHONE ENCOUNTER
Reviewed cardiomems. PAD gradually increasing- 27 today with goal 20. Previous PAD up to 30s and she was given metolazone 2.5mg X3 doses. I spoke to Edward Whaley. She thinks that did help at the time but now accumulating fluid again with SOB and edema. She sounds SOB talking to her on the phone. Will add metolazone 5mg QOD until she sees Dr. Laya Perez next wk 3/7. Will defer to him to adjust medications further. She may benefit from adding SGLT2i.

## 2023-03-02 ENCOUNTER — NURSE ONLY (OUTPATIENT)
Dept: CARDIOLOGY CLINIC | Age: 78
End: 2023-03-02
Payer: MEDICARE

## 2023-03-02 DIAGNOSIS — I50.22 CHRONIC SYSTOLIC (CONGESTIVE) HEART FAILURE (HCC): Primary | ICD-10-CM

## 2023-03-02 PROCEDURE — 93264 REM MNTR WRLS P-ART PRS SNR: CPT | Performed by: NURSE PRACTITIONER

## 2023-03-02 NOTE — PROGRESS NOTES
I have reviewed, interpreted and responded to the remote patient monitoring date via telephone, [employer] benji and/or Direct Call messaging at least weekly for the last 30 days. Goal PAD : 20  Patient has maintained goal with interventions as documented in patient notes. See previous encounters.

## 2023-03-03 ENCOUNTER — TELEPHONE (OUTPATIENT)
Dept: CARDIOLOGY CLINIC | Age: 78
End: 2023-03-03

## 2023-03-03 ENCOUNTER — OFFICE VISIT (OUTPATIENT)
Dept: CARDIOLOGY CLINIC | Age: 78
End: 2023-03-03
Payer: MEDICARE

## 2023-03-03 VITALS
DIASTOLIC BLOOD PRESSURE: 68 MMHG | OXYGEN SATURATION: 98 % | HEART RATE: 62 BPM | HEIGHT: 64 IN | BODY MASS INDEX: 39.27 KG/M2 | SYSTOLIC BLOOD PRESSURE: 120 MMHG | WEIGHT: 230 LBS

## 2023-03-03 DIAGNOSIS — R60.9 SWELLING: ICD-10-CM

## 2023-03-03 DIAGNOSIS — R63.5 WEIGHT GAIN: ICD-10-CM

## 2023-03-03 DIAGNOSIS — R06.09 DYSPNEA ON EXERTION: ICD-10-CM

## 2023-03-03 DIAGNOSIS — I50.33 ACUTE ON CHRONIC DIASTOLIC CONGESTIVE HEART FAILURE (HCC): ICD-10-CM

## 2023-03-03 DIAGNOSIS — D64.9 CHRONIC ANEMIA: ICD-10-CM

## 2023-03-03 DIAGNOSIS — I50.33 ACUTE ON CHRONIC DIASTOLIC CONGESTIVE HEART FAILURE (HCC): Primary | ICD-10-CM

## 2023-03-03 LAB
ANION GAP SERPL CALCULATED.3IONS-SCNC: 12 MMOL/L (ref 3–16)
BUN BLDV-MCNC: 36 MG/DL (ref 7–20)
CALCIUM SERPL-MCNC: 10 MG/DL (ref 8.3–10.6)
CHLORIDE BLD-SCNC: 91 MMOL/L (ref 99–110)
CO2: 35 MMOL/L (ref 21–32)
CREAT SERPL-MCNC: 1.4 MG/DL (ref 0.6–1.2)
GFR SERPL CREATININE-BSD FRML MDRD: 39 ML/MIN/{1.73_M2}
GLUCOSE BLD-MCNC: 136 MG/DL (ref 70–99)
HCT VFR BLD CALC: 29 % (ref 36–48)
HEMOGLOBIN: 9.2 G/DL (ref 12–16)
MCH RBC QN AUTO: 26.7 PG (ref 26–34)
MCHC RBC AUTO-ENTMCNC: 31.7 G/DL (ref 31–36)
MCV RBC AUTO: 84.1 FL (ref 80–100)
PDW BLD-RTO: 18.3 % (ref 12.4–15.4)
PLATELET # BLD: 213 K/UL (ref 135–450)
PMV BLD AUTO: 9.5 FL (ref 5–10.5)
POTASSIUM SERPL-SCNC: 3.8 MMOL/L (ref 3.5–5.1)
PRO-BNP: 2908 PG/ML (ref 0–449)
RBC # BLD: 3.44 M/UL (ref 4–5.2)
SODIUM BLD-SCNC: 138 MMOL/L (ref 136–145)
WBC # BLD: 14.4 K/UL (ref 4–11)

## 2023-03-03 PROCEDURE — G8484 FLU IMMUNIZE NO ADMIN: HCPCS | Performed by: INTERNAL MEDICINE

## 2023-03-03 PROCEDURE — 1090F PRES/ABSN URINE INCON ASSESS: CPT | Performed by: INTERNAL MEDICINE

## 2023-03-03 PROCEDURE — G8417 CALC BMI ABV UP PARAM F/U: HCPCS | Performed by: INTERNAL MEDICINE

## 2023-03-03 PROCEDURE — 1036F TOBACCO NON-USER: CPT | Performed by: INTERNAL MEDICINE

## 2023-03-03 PROCEDURE — G8427 DOCREV CUR MEDS BY ELIG CLIN: HCPCS | Performed by: INTERNAL MEDICINE

## 2023-03-03 PROCEDURE — 99214 OFFICE O/P EST MOD 30 MIN: CPT | Performed by: INTERNAL MEDICINE

## 2023-03-03 PROCEDURE — 3074F SYST BP LT 130 MM HG: CPT | Performed by: INTERNAL MEDICINE

## 2023-03-03 PROCEDURE — 3078F DIAST BP <80 MM HG: CPT | Performed by: INTERNAL MEDICINE

## 2023-03-03 PROCEDURE — G8399 PT W/DXA RESULTS DOCUMENT: HCPCS | Performed by: INTERNAL MEDICINE

## 2023-03-03 PROCEDURE — 1123F ACP DISCUSS/DSCN MKR DOCD: CPT | Performed by: INTERNAL MEDICINE

## 2023-03-03 RX ORDER — METOLAZONE 5 MG/1
5 TABLET ORAL DAILY
Qty: 30 TABLET | Refills: 0 | Status: SHIPPED | OUTPATIENT
Start: 2023-03-03 | End: 2023-03-06

## 2023-03-03 RX ORDER — FUROSEMIDE 20 MG/1
TABLET ORAL
Qty: 90 TABLET | Refills: 3
Start: 2023-03-03

## 2023-03-03 NOTE — TELEPHONE ENCOUNTER
Patient seen today in clinic. Had missed 2 appointment. Per our request, she completed once home and we received CARDIOMEMS PAD reading today. Goal 20, Range 15-25  3/3 28  3/1 31  2/28 34  2/26 27  2/24 29  Stat lab results not back yet. Spoke with Dr. Monisha Frazier and instructions to go back up on Lasix to 60 mg a.m. and 40 mg p.m. and continues metolazone 5 mg daily, 30 minutes prior to morning lasix dose. Once we receive lab results and get a status update on Monday, we will give further instructions. We again discuss like we did in office today when to seek medical attention. [phone] spoke with Mini Finn. She read back instructions for the weekend accurately. No further q/c at this time.

## 2023-03-03 NOTE — PROGRESS NOTES
[facility]  Office Visit Progress Note    Perri Bell  1945    March 3, 2023    CC: \"I have more SOB and swelling. \"     HPI:  The patient is 69 y.o. female with a past medical history significant for CAD, atrial fib, HTN, aortic aneurysm and COPD who is here for follow up. Admitted 1/7/2018-1/12/2018  With SOB and chest pain and dx with acute on chronic diastolic HF (diuresed), and atrial fib. Troponins elevated, testing pended due to sepsis from the flu. Outpatient stress testing positive and she underwent cardiac cath on 3/2/18 which revealed  of the RCA and nonobstructive CAD of the LAD and LCX. She also underwent endovascular AAA repair on 3/21/18 by Dr. Oral Cohen. Patient was  in the hospital and had 2/25/20 for atrial fibrillation along with signs and symptoms of heart failure. She converted to sinus rhythm after she was started on amiodarone therapy. Continued management per Dr. Akila Hernández. S/p PVI ablation 10/2020, she then had Afib ablation last year but had recurrence of A fib She underwent DCCV 1/12/21,  repeat AFIB ablation 2/17/21. continue Xarelto,  Toprol-XL. Flecainide discontinued 5/18/21 with plans for 30 day Event monitor at his f/u with Philippe Snow CNP. Admitted 9-9-21 to 9-12-21 acute on chronic diastolic heart failure, pleural effusion and AFIB. DC weight 221#. She continues with off/on SOB per patient's report. Admitted 5/24-5/26/22 atrial fibrillation for dofetilide loading after having multiple ablations, cardioversions (last ablation 2/4/22 ,CV 4/2022) and failing flecainide therapy. She was started on 250mcg of dofetilide Q12H. Her QTc was monitored closely without any significant changes or need for dose adjustment. Electrolytes also monitored daily and replaced if needed. She did convert to sinus rhythm on 5/25/22 with improvement in symptoms. QTc remains acceptable today.  She was discharged on 250mcg of dofetilide Q12H with a BMP in 1 week. Presented to [facility] ED 7/8 with worsening SOB and admitted for acute on chronic diastolic heart failure. Started on IV lasix and diuresed. Developed MARK. Diuresed 228 lbs. DC weight total 4L    Readmitted 7/26/22-8/1/22 worsening SOB and cough prior 3 days, weakness admitted COPD exacerbation/PNA/MARK/acute on chronic dCHF. Prsents today in hospital follow up and to discuss candidacy for CardioMEMS heart failure device implant and ischemic workup. 8/5/22 office visit, She is on 2L per NC since discharge from the hospital. She notes SOB but denies any other cardiac symptoms. Abnormal Lexiscan followed by  [facility] on 9/20/22 per Dr. Vilma Jones. HFU 11/2-11/8/22 presenting with sudden increased SOB. Chronic 4L oxygen therapy but her O2 levels were dropping into the 60%. She was admitted for acute on chronic hypoxic respiratory failure. One episodes of rapid atrial fibrillation, symptomatic, started on cardizem and converted. [address] 11/4/22 revealed she was in acute on chronic diastolic heart failure. NYHA class II, stage C. Her oxygen demand improved  and oxygen was weaned to 2-2.5L. Her uncontrolled blood pressure, and recurrent atrial fibrillation playing a role in her CHF recurrences    12/16/2022 she is accompanied by her  in follow up from [address] [place] [address] HF implant 11/28/22. We have been following her daily PAD/PA mean readings. Today, PAD 34, PA mean 53, goal PAD 20, range 15-25. They report increase in weight and SOB. They are taking Lasix 60 mg a.m. 40 mg p.m. repeat labs 12/11/22. Today, returns for follow up and had cancelled her last 2 visits. Both Myra Leventhal and her  report worsening LI, weight gain, BLE edema bilateral feet pain.  They did start metolazone 5 mg daily on 2/27/23 as instructed per CHANDA Chu which they did, however, patient's  reports they are on Lasix 40 mg BID and not sure when reduced from 60 mg a.m and 40 mg p.m. she was also on Demadex in the past. She reports compliance with other medications and feels she is tolerating. Oxygen therapy remains in place. Patient denies exertional chest pain/pressure, palpitations, lightheadedness, and syncope. Bilateral arm bruises. Review of Systems:  Constitutional: Denies falls, night sweats or fever. Fatigue improved. HEENT: Denies new visual changes, ringing in ears, nosebleeds, nasal congestion  Respiratory: + SOBE on oxygen therapy. Cardiovascular: see HPI  GI: Denies N/V, diarrhea, constipation, abdominal pain, change in bowel habits, melena or hematochezia  : Denies urinary frequency, urgency, incontinence, hematuria or dysuria. Skin: Denies rash, hives, or cyanosis  Musculoskeletal: Denies joint or muscle aches/pain  Neurological: Denies syncope or TIA-like symptoms. Psychiatric: Denies anxiety or depression, negative insomnia     Past Medical History:   Diagnosis Date    AAA (abdominal aortic aneurysm)     pt states it is 4cm    AAA (abdominal aortic aneurysm) without rupture 2/10/2015    Atrial fibrillation (HCC)     CAD (coronary artery disease)     CHF (congestive heart failure) (HCC)     COPD (chronic obstructive pulmonary disease) ([facility] [address]     History of blood clots     Hyperlipidemia     Hypertension      Past Surgical History:   Procedure Laterality Date    ABDOMINAL AORTIC ANEURYSM REPAIR      Endovascular abdominal AA    APPENDECTOMY  1990    incidental    BLADDER SUSPENSION      CARDIAC CATHETERIZATION Right 11/28/2022    Cardiomems PA sensor device implant Left PA    CARDIAC SURGERY      CATARACT REMOVAL      CHOLECYSTECTOMY  10/15/2013    COLONOSCOPY  09/02/2007    dr Urban Burden and check in 5 years. COLONOSCOPY  06/16/2017    ok dr arndt, repeat 5 years    HYSTERECTOMY ([facility])  1990    for benign tumor.  just the uterus    JOINT REPLACEMENT  12/2013    right knee replacement    TONSILLECTOMY  as a child    TUMOR EXCISION      benign behind right ear about 2008     Family History   Problem Relation Age of Onset    Heart Disease Father     Hypertension Other      Social History     Tobacco Use    Smoking status: Former     Packs/day: 1.00     Years: 37.00     Pack years: 37.00     Types: Cigarettes     Start date: 1976     Quit date: 2013     Years since quittin.4     Passive exposure: Past    Smokeless tobacco: Never    Tobacco comments:     E cigarette now and then   Vaping Use    Vaping Use: Former    Quit date: 2021    Substances: Nicotine   Substance Use Topics    Alcohol use: No    Drug use: No       Allergies   Allergen Reactions    Morphine Rash     rash     Current Outpatient Medications   Medication Sig Dispense Refill    metOLazone (ZAROXOLYN) 5 MG tablet Take 1 tablet by mouth every other day for 5 doses Take 30 min prior to AM lasix 5 tablet 0    fluticasone-umeclidin-vilant (TRELEGY ELLIPTA) 200-62.5-25 MCG/ACT AEPB inhaler Inhale 1 puff into the lungs daily 1 each 11    clopidogrel (PLAVIX) 75 MG tablet TAKE ONE TABLET BY MOUTH DAILY 30 tablet 3    isosorbide mononitrate (IMDUR) 60 MG extended release tablet Take 2 tablets by mouth daily (Patient taking differently: Take 60 mg by mouth daily) 180 tablet 3    rosuvastatin (CRESTOR) 20 MG tablet Take 1 tablet by mouth daily 90 tablet 3    dilTIAZem (CARDIZEM CD) 120 MG extended release capsule TAKE ONE CAPSULE BY MOUTH EVERY MORNING AND TAKE ONE CAPSULE BY MOUTH EVERY NIGHT AT BEDTIME 180 capsule 1    hydrALAZINE (APRESOLINE) 50 MG tablet TAKE ONE TABLET BY MOUTH EVERY 8 HOURS 270 tablet 1    potassium chloride (KLOR-CON M) 20 MEQ extended release tablet TAKE 1 TABLET BY MOUTH 2 TIMES A DAY WITH MEALS 180 tablet 1    rivaroxaban (XARELTO) 20 MG TABS tablet TAKE ONE TABLET BY MOUTH DAILY WITH BREAKFAST 30 tablet 11    furosemide (LASIX) 20 MG tablet Lasix 60mg in AM and 40mg in PM (Patient taking differently: Take 40 mg by mouth 2 times daily Lasix 40 mg in AM and 40 mg in PM) 140 tablet 3    dofetilide (TIKOSYN) 250 MCG capsule TAKE ONE CAPSULE BY MOUTH EVERY 12 HOURS 180 capsule 3    albuterol (PROVENTIL) (2.5 MG/3ML) 0.083% nebulizer solution Take 2.5 mg by nebulization every 4 hours as needed for Wheezing      SYMBICORT 160-4.5 MCG/ACT AERO Inhale 2 puffs into the lungs in the morning and 2 puffs before bedtime. 1 each 11    vitamin C (ASCORBIC ACID) 500 MG tablet Take 500 mg by mouth daily      albuterol sulfate  (90 Base) MCG/ACT inhaler INHALE TWO PUFFS BY MOUTH EVERY 4 HOURS AS NEEDED FOR WHEEZING OR FOR SHORTNESS OF BREATH 1 each 3    Multiple Vitamins-Minerals (MULTI FOR HER 50+ PO) Take 1 tablet by mouth daily       No current facility-administered medications for this visit. Physical Exam:   /68   Pulse 62   Ht 5' 4\" (1.626 m)   Wt 230 lb (104.3 kg)   SpO2 98%   BMI 39.48 kg/m²   Wt Readings from Last 2 Encounters:   03/03/23 230 lb (104.3 kg)   02/21/23 220 lb (99.8 kg)     Physical Exam:   Constitutional: The patient is oriented to person, place, and time. Appears well-developed and well-nourished. In no acute distress. Head: Normocephalic and atraumatic. Pupils equal and round. Neck: Neck supple. Difficult to assess JVP. no carotid bruit appreciated. No mass and no thyromegaly present. No lymphadenopathy present. Cardiovascular: Normal rate and regular rhythm. Normal heart sounds. Exam reveals no gallop and no friction rub. 0-2/9 systolic murmur at the base of her heart. Pulmonary/Chest: Effort normal.  No respiratory distress. No wheezes, no rhonchi,  + diminished breath sounds and + bibasilar crackles. Abdominal: Soft, non-tender. Bowel sounds are normal. Exhibits no organomegaly, mass or bruit. Extremities: 2+ BLE edema. No cyanosis or clubbing. Pulses are 2+ radial and carotid bilaterally. Neurological: No gross cranial nerve deficit. Coordination normal.   Skin: Skin is warm and dry. There is no rash or diaphoresis. Psychiatric: Patient has a normal mood and affect.  Speech is normal and behavior is normal.     Lab Review:   Lab Results   Component Value Date/Time    TRIG 59 09/10/2021 07:22 AM    HDL 56 09/10/2021 07:22 AM    HDL 38 2011 03:35 PM    LDLCALC 39 09/10/2021 07:22 AM    LDLDIRECT 111 2012 11:32 AM    LABVLDL 12 09/10/2021 07:22 AM     Lab Results   Component Value Date/Time     2022 12:27 PM    K 3.7 2022 12:27 PM    K 3.8 2022 04:46 AM    CL 88 2022 12:27 PM    CO2 37 2022 12:27 PM    BUN 36 2022 12:27 PM    CREATININE 1.6 2022 12:27 PM    GLUCOSE 107 2022 12:27 PM    GLUCOSE 102 2016 01:03 PM    CALCIUM 10.0 2022 12:27 PM      Lab Results   Component Value Date    WBC 12.3 (H) 2022    HGB 9.2 (L) 2022    HCT 28.6 (L) 2022    MCV 87.1 2022     2022       EK2018: Sinus rhythm with old anterior infarct  18: Sinus Rhythm, PACs, Old anterior infarct. 19: Sinus Rhythm  20: Sinus  Rhythm  - occasional PAC, # PACs = 1, -Anteroseptal infarct -age undetermined.    -Nonspecific ST depression  -Nondiagnostic. 21: Sinus rhythm  -First degree A-V block , -Anteroseptal infarct -age undetermined. 22: SR   : controlled atrial flutter  3/3/2023 not completed     Echo 2018:  Mild concentric left ventricular hypertrophy. Visually estimated ejection fraction of 65%. Mitral annular calcification. Mild left atrial dilation. Mild aortic stenosis. (mean gradients 55BPKD)  The systolic pulmonary artery pressure (SPAP) estimated at 30 mmHg  (estimated RA pressure of 8 mmHg included). [facility]: ([address]) 2017   of RCA chronic LAD 10-20% RA 4 PA24/9 16 wedge 9 LVEDP markedly elevated 30    Carotid US 10/2017 at [facility]:  1. Estimated diameter reduction of the right internal carotid artery is  less than 50%. 2.  Estimated diameter reduction of the left internal carotid artery is  50-69%.   3.  The bilateral common carotid arteries reveal no evidence of   significant stenosis. 4.  There is greater than 50% stenosis of the right external carotid  artery. 5.  There is no evidence of significant stenosis in the left external  carotid artery. 6.  The bilateral vertebral arteries are patent with antegrade flow. 7.  The bilateral subclavian arteries reveal no evidence of significant  stenosis. 8.  There has been no significant change from the previous study of  4/21/2017. Lexiscan 2/19/18   Summary    Abnormal study. There is a moderate sized, mild intensity, partially    reversible defect of the mid to apical anterior and mid anterolateral walls    which is consistent with ischemia. There is breast attenuation but stress    images appear worse. Normal LV size and systolic function. Overall findings represent an intermediate risk study     Cardiac Cath 3/5/18  OVERALL IMPRESSION  1. Again, 100% proximal right coronary artery occlusion with left to right  collaterals. 2.  Mild disease of the distal left main trunk. 3.  20% to 30% ostial left anterior descending artery stenosis with  collaterals from LAD to the right coronary artery. 4.  30% to 40% stenosis of the proximal circumflex artery. 5.  Normal left ventricular systolic function with estimated EF of 55% to  60%. 6.  Slightly enlarged aortic root with no evidence of aortic stenosis or  regurgitation. In view of the above findings, we will okay the patient for her upcoming  aortic aneurysm surgery from cardiac standpoint. ECHO 1/2/20  There is moderate concentric left ventricular hypertrophy. Patient appears to be in atrial fibrillation. Ejection fraction is estimated to be 50-60%. Indeterminate diastolic function. Mild aortic regurgitation. Mild tricuspid regurgitation. Mild mitral regurgitation with mitral annular calcification    Right Heart Cath 2/28/2020  1.  _____ normal right cardiac pressure.   2.  Elevated pulmonary capillary wedge pressure with a mean pulmonary  capillary wedge of 29 mmHg. 3.  Normal cardiac output and indices. 4.  No oxygen step off to suggest ASD/PFO. In view of the above findings, we will start the patient on a small dose  of diuretic therapy and see whether we need to adjust some of her  antihypertensive medicines or not. Her findings are consistent with a  diastolic heart failure. Stress test: 9/6/22      Abnormal myocardial perfusion study    Moderate sized moderate intensity mid to distal anterior, chad-lateral    reversible defect suggestive of ischemia    Reversible mid to base inferior wall defect    Possible suggestion of 2 V region of ischemia    Overall findings represent a intermediate-high risk study. Suggest [facility]     Cath: 9/20/22  OVERALL IMPRESSION:  1. Mildly elevated right heart pressures. 2.  Normal cardiac output and indices. 3.  No oxygen step-up to suggest ASD or PFO. 4.  Patent left main trunk with mild disease. 5.  Tortuous LAD with mild osteal disease, not hemodynamically  significant. 6.  75% stenosis of the proximal circumflex artery with some mild  disease of the obtuse marginal branch, not hemodynamically significant. 7.  Left-to-right collaterals. 8.  100% occlusion of mid RCA. 9.  Normal left ventricular systolic function with preserved EF of 65%  to 70%. Cath: 9/20/22 [place]    Findings:  Left Main  Distal non obstructive 30% disease, evaluated by IVUS MLA > 7.5 mm2   LAD  Moderate diffuse disease   Circ  Proximal vessel 75-80% stenosis correlating with positive functional study   Mid vessel non-obstructive disease 50%   RCA   with L-R collaterals         Interventions/Vessels  PCI performed to the proximal Lcx with DESx2   Resolute Mckay 3.5x22 mm   Resolute Hooker 3. 5x8 mm   Excellent post angiographic result with BALWINDER 3 flow   Guides/Wires  XB 3.5 guide catheter   Terumo RunThrough   Balanced Heavy Weight   Devices  Opticross boston IVUS   Post % Stenosis  0   Closure Device  Perclose R CFA   Complications  None      Conclusion:   Successful PCI of proximal Lcx with DESx2  Plavix loaded in cath lab  Femoral artery perclosed with excellent hemostasis  3H bedrest  Home this evening, follow up in 1-2 weeks    Echo: 11/3/22   Borderline conc. LVH with normal wall motion; EF 70%. Grade II diastolic dysfunction with elevated LH filling pressures. The left atrium is severely dilated. The right ventricle is normal in size and function. Mild mitral, aortic and tricuspid regurgitation. [address] 11/4/22  Findings:  RA  5   RV  53/9   PA  49/20 (28)   PCWP  25   MCFP  3.8    Conclusion: Post capillary pulmonary hypertension, Recommend continued diuresis     Assessment/Plan:       Chronic diastolic heart failure  -No aldactone secondary to elevated Cr.   -as of 9/29/22 she had been admitted x2 last 1 month to [facility]  -referred for consideration candidacy for CardioMEMS HF implant  -she needed out patient ischemic workup--9/6/22 abnormal  Lexiscan myoview followed by a   -[facility] 9/20/22 s/p successful PC proximal Lcx with D#ES x2. LM 30% disease, LAD moderate diffuse dx, mid vessal 50% ,  with L-R collaterals of the RCA.   -11/28/22 s/p CardioMEMS HF implant       -Today, she reports worsening LI, weight gain, BLE edema, bilateral feet pain  -She appears decompensated on physical exam today  -following PAD/PA but has not completed every day and last download 3/1/2023. They will go home and complete a PAD download for review. STAT CBC, BMP, BNP. I will adjust her diuretic regiment after reviewing her CardioMEMS data and renal function.  -she has not taken her lasix nor metolazone today. Instructed to take when she gets home this afternoon.   -Currently on isosorbide, tikosyn, diltiazem,  hydralazine, Imdur.   -We will consider SGLT2 inhibitor therapy after reviewing her renal function  -Encouraged medication compliance, low salt diet with hx of indiscretions, compression stockings and elevating legs. -Follow up planned for 1 week. If worsens over the weekend, she has been instructed to proceed to the ER or call 911. NYHA class III-IV     Coronary artery disease involving native coronary artery of native heart without angina pectoris  -She previously reported myalgias with Crestor with resolution of cramping after discontinuing. -[facility] 9/20/22 s/p PCI of proximal Lcx with DESx2 by Dr. Dallas Baumann  -[address] 11/4/22 acute on chronic diastolic heart failure (with acute on chronic respiratory failure)  -also episode of symptomatic AFIB during hospitalization.     -Today, denies any angina however, continues with above symptoms reported. -continues to wear 3-4L of oxygen   -remain on triple therapy; Plavix, , rosuvastatin  -on Xarelto and Plavix. Essential hypertension  -Blood pressure is stable today on medical therapy. Encouraged on low salt diet. Paroxysmal atrial fibrillation   -CHADS VASC score 7 for age, gender, DVT, CHF, HTN, CAD.   -Managed per Dr. Molly Dennison. S/p PVI ablation 10/2020, she then had Afib ablation last year but had recurrence of A fib She underwent DCCV 1/12/21,  repeat AFIB ablation 2/17/21, DCCV 4/2022.   -5/24/22 admitted for dofetilide loading after having multiple ablations, cardioversions and failing flecainide therapy. She was started on 250mcg of dofetilide Q12H. Her QTc was monitored closely without any significant changes or need for dose adjustment. Electrolytes also monitored daily and replaced if needed. She did convert to sinus rhythm on 5/25/22 with improvement in symptoms. QTc remains acceptable today. She will be discharged on 250mcg of dofetilide Q12H with a BMP in 1 week.   -11/2022 admitted for acute on chronic respiratory failure, acute on chronic diastolic heart failure went into symptomatic rapid atrial fibrillation. -worsening heart failure, hypervolemic today.   -physical exam as above with regular rate and rhythm.    -remains oxygen therapy per [place] 3-4L   -Xarelto for [address] [place] [address]. Extremity with superficial bruising. Denies bleeding. a  -normal rate and rhythm per physical exam.    Systolic murmur  Echo reviewed 11/2/22. Which showed no significant valvular abnormalities    Severe COPD/Asthma/chronic respiratory failure  managed per Dr. Jordan. Keep f/u with pulmonology. AAA (abdominal aortic aneurysm) without rupture   -Underwent endovascular AAA repair on 3/21/18 by Dr. Luis Daniel Diaz. Follows vascular surgery    Sleep apnea  -Intolerant to CPAP. Anemia with hx of Epistaxis  -(Plavix and Xarelto) for CAD with recent stent placement and Afib.   -may stop aspirin on 10/20/22. -CBC with next blood draw. STAT labs, CardioMEM'S download and will provide further instructions today. Follow up planned for 1 week. If worsens over the weekend, she has been instructed to proceed to the ER or call 911. Thank you very much for allowing me to participate in the care of your patient. Please do not hesitate to contact me if you have any questions. Sincerely,  Gisela Luna MD      [facility], [address] [place] [address]  Ph: [phone]  Fax: [phone]    This note was scribed in the presence of Dr. Odell Guzman MD by Srinivasa Wagoner RN.

## 2023-03-06 ENCOUNTER — TELEPHONE (OUTPATIENT)
Dept: CARDIOLOGY CLINIC | Age: 78
End: 2023-03-06

## 2023-03-06 RX ORDER — METOLAZONE 5 MG/1
TABLET ORAL
Qty: 15 TABLET | Refills: 0 | Status: SHIPPED
Start: 2023-03-06 | End: 2023-03-09 | Stop reason: DRUGHIGH

## 2023-03-06 NOTE — TELEPHONE ENCOUNTER
Ollie Carlin  Signed   9:19 AM     TEST/LAB RESULTS  WHICH RESULTS ARE YOU CALLING ABOUT: Lab Results   WHEN WAS THIS DONE: Friday 3/3/23  WHERE DID YOU HAVE THIS DONE: [facility]  CALL BACK NUMBER: [phone]

## 2023-03-06 NOTE — TELEPHONE ENCOUNTER
TEST/LAB RESULTS      WHICH RESULTS ARE YOU CALLING ABOUT: Lab Results     WHEN WAS THIS DONE: Friday 3/3/23    WHERE DID YOU HAVE THIS DONE: [facility]    CALL BACK NUMBER: [phone]

## 2023-03-09 ENCOUNTER — TELEPHONE (OUTPATIENT)
Dept: CARDIOLOGY CLINIC | Age: 78
End: 2023-03-09

## 2023-03-09 RX ORDER — METOLAZONE 5 MG/1
TABLET ORAL
Qty: 36 TABLET | Refills: 3 | Status: SHIPPED | OUTPATIENT
Start: 2023-03-09

## 2023-03-09 NOTE — TELEPHONE ENCOUNTER
PAD remains elevated- 30.   Spoke to Marely Sears, she is very SOB on the phone. Then spoke to Shirrel her . He endorses that she is SOB but thinks her swelling in improving. Advised to take metolazone 5mg today and then increase metolazone to 5mg qMWF. She is scheduled to see Dr. Terence Bruner tomorrow and he can make further med adjustments.

## 2023-03-10 ENCOUNTER — OFFICE VISIT (OUTPATIENT)
Dept: CARDIOLOGY CLINIC | Age: 78
End: 2023-03-10

## 2023-03-10 VITALS
BODY MASS INDEX: 38.76 KG/M2 | WEIGHT: 227 LBS | HEART RATE: 70 BPM | OXYGEN SATURATION: 98 % | HEIGHT: 64 IN | DIASTOLIC BLOOD PRESSURE: 62 MMHG | SYSTOLIC BLOOD PRESSURE: 118 MMHG

## 2023-03-10 DIAGNOSIS — R06.09 DYSPNEA ON EXERTION: ICD-10-CM

## 2023-03-10 DIAGNOSIS — J96.10 CHRONIC RESPIRATORY FAILURE, UNSPECIFIED WHETHER WITH HYPOXIA OR HYPERCAPNIA (HCC): ICD-10-CM

## 2023-03-10 DIAGNOSIS — I25.10 CORONARY ARTERY DISEASE INVOLVING NATIVE CORONARY ARTERY OF NATIVE HEART WITHOUT ANGINA PECTORIS: ICD-10-CM

## 2023-03-10 DIAGNOSIS — J44.9 COPD, SEVERE (HCC): ICD-10-CM

## 2023-03-10 DIAGNOSIS — R01.1 SYSTOLIC MURMUR: ICD-10-CM

## 2023-03-10 DIAGNOSIS — I48.19 PERSISTENT ATRIAL FIBRILLATION (HCC): ICD-10-CM

## 2023-03-10 DIAGNOSIS — N18.9 CHRONIC KIDNEY DISEASE, UNSPECIFIED CKD STAGE: ICD-10-CM

## 2023-03-10 DIAGNOSIS — I50.33 ACUTE ON CHRONIC DIASTOLIC CONGESTIVE HEART FAILURE (HCC): Primary | ICD-10-CM

## 2023-03-10 DIAGNOSIS — D64.9 CHRONIC ANEMIA: ICD-10-CM

## 2023-03-10 DIAGNOSIS — R60.9 SWELLING: ICD-10-CM

## 2023-03-10 DIAGNOSIS — R63.5 WEIGHT GAIN: ICD-10-CM

## 2023-03-10 DIAGNOSIS — I10 ESSENTIAL HYPERTENSION: ICD-10-CM

## 2023-03-10 RX ORDER — ISOSORBIDE MONONITRATE 60 MG/1
120 TABLET, EXTENDED RELEASE ORAL DAILY
Qty: 180 TABLET | Refills: 3 | Status: SHIPPED | OUTPATIENT
Start: 2023-03-10

## 2023-03-10 NOTE — PROGRESS NOTES
[facility]  Office Visit Progress Note    Marely Hackett  1945    March 10, 2023    CC: \"I think my SOB and swelling are a little better. \"     HPI:  The patient is 69 y.o. female with a past medical history significant for CAD, atrial fib, HTN, aortic aneurysm and COPD who is here for follow up. Admitted 1/7/2018-1/12/2018  With SOB and chest pain and dx with acute on chronic diastolic HF (diuresed), and atrial fib. Troponins elevated, testing pended due to sepsis from the flu. Outpatient stress testing positive and she underwent cardiac cath on 3/2/18 which revealed  of the RCA and nonobstructive CAD of the LAD and LCX. She also underwent endovascular AAA repair on 3/21/18 by Dr. Samantha Milian. Patient was  in the hospital and had 2/25/20 for atrial fibrillation along with signs and symptoms of heart failure. She converted to sinus rhythm after she was started on amiodarone therapy. Continued management per Dr. Misti Whitman. S/p PVI ablation 10/2020, she then had Afib ablation last year but had recurrence of A fib She underwent DCCV 1/12/21,  repeat AFIB ablation 2/17/21. continue Xarelto,  Toprol-XL. Flecainide discontinued 5/18/21 with plans for 30 day Event monitor at his f/u with Rao Wynn CNP. Admitted 9-9-21 to 9-12-21 acute on chronic diastolic heart failure, pleural effusion and AFIB. DC weight 221#. She continues with off/on SOB per patient's report. Admitted 5/24-5/26/22 atrial fibrillation for dofetilide loading after having multiple ablations, cardioversions (last ablation 2/4/22 ,CV 4/2022) and failing flecainide therapy. She was started on 250mcg of dofetilide Q12H. Her QTc was monitored closely without any significant changes or need for dose adjustment. Electrolytes also monitored daily and replaced if needed. She did convert to sinus rhythm on 5/25/22 with improvement in symptoms. QTc remains acceptable today.  She was discharged on 250mcg of dofetilide Q12H with a BMP in 1 week. Presented to [facility] ED 7/8 with worsening SOB and admitted for acute on chronic diastolic heart failure. Started on IV lasix and diuresed. Developed MARK. Diuresed 228 lbs. DC weight total 4L    Readmitted 7/26/22-8/1/22 worsening SOB and cough prior 3 days, weakness admitted COPD exacerbation/PNA/MARK/acute on chronic dCHF. Prsents today in hospital follow up and to discuss candidacy for CardioMEMS heart failure device implant and ischemic workup. 8/5/22 office visit, She is on 2L per NC since discharge from the hospital. She notes SOB but denies any other cardiac symptoms. Abnormal Lexiscan followed by  [place] on 9/20/22 per Dr. Charlotte Blake. HFU 11/2-11/8/22 presenting with sudden increased SOB. Chronic 4L oxygen therapy but her O2 levels were dropping into the 60%. She was admitted for acute on chronic hypoxic respiratory failure. One episodes of rapid atrial fibrillation, symptomatic, started on cardizem and converted. [address] 11/4/22 revealed she was in acute on chronic diastolic heart failure. NYHA class II, stage C. Her oxygen demand improved  and oxygen was weaned to 2-2.5L. Her uncontrolled blood pressure, and recurrent atrial fibrillation playing a role in her CHF recurrences    12/16/2022 she is accompanied by her  in follow up from [address] [place] [address] HF implant 11/28/22. We have been following her daily PAD/PA mean readings. Today, PAD 34, PA mean 53, goal PAD 20, range 15-25. They report increase in weight and SOB. They are taking Lasix 60 mg a.m. 40 mg p.m. repeat labs 12/11/22.     3/3/2023: returns for follow up and had cancelled her last 2 visits. Both Zen Skaggs and her  report worsening LI, weight gain, BLE edema bilateral feet pain.  They did start metolazone 5 mg daily on 2/27/23 as instructed per CHANDA Chu which they did, however, patient's  reports they are on Lasix 40 mg BID and not sure when reduced from 60 mg a.m and 40 mg p.m. she was also on Demadex in the past. She reports compliance with other medications and feels she is tolerating. Oxygen therapy remains in place. Patient denies exertional chest pain/pressure, palpitations, lightheadedness,   and syncope. Bilateral arm bruises. Today, she is accompanied by her . They both report increase in urination and improvement in her LI and swelling since we saw her 1 week ago. They started her metolazone 5 mg prior to evening dose of furosemide yesterday. She has not received her diuretic therapy today due to multiple appointments. They report medical therapy compliance and tolerating. They deny any abnormal bruising or bleeding. Patient denies exertional chest pain/pressure, dyspnea at rest, PND, orthopnea, palpitations, lightheadedness, changes in LE edema, and syncope. Review of Systems:  Constitutional: Denies falls, night sweats or fever. Fatigue improved. HEENT: Denies new visual changes, ringing in ears, nosebleeds, nasal congestion  Respiratory: + SOBE on oxygen therapy. Cardiovascular: see HPI  GI: Denies N/V, diarrhea, constipation, abdominal pain, change in bowel habits, melena or hematochezia  : Denies urinary frequency, urgency, incontinence, hematuria or dysuria. Skin: Denies rash, hives, or cyanosis  Musculoskeletal: Denies joint or muscle aches/pain  Neurological: Denies syncope or TIA-like symptoms.   Psychiatric: Denies anxiety or depression, negative insomnia     Past Medical History:   Diagnosis Date    AAA (abdominal aortic aneurysm)     pt states it is 4cm    AAA (abdominal aortic aneurysm) without rupture 2/10/2015    Atrial fibrillation (HCC)     CAD (coronary artery disease)     CHF (congestive heart failure) (HCC)     COPD (chronic obstructive pulmonary disease) ([facility] [address]     History of blood clots     Hyperlipidemia     Hypertension      Past Surgical History:   Procedure Laterality Date    ABDOMINAL AORTIC ANEURYSM REPAIR      Endovascular abdominal AA APPENDECTOMY      incidental    BLADDER SUSPENSION      CARDIAC CATHETERIZATION Right 2022    Cardiomems PA sensor device implant Left PA    CARDIAC SURGERY      CATARACT REMOVAL      CHOLECYSTECTOMY  10/15/2013    COLONOSCOPY  2007    dr Viraj Ayala and check in 5 years. COLONOSCOPY  2017    ok dr arndt, repeat 5 years    HYSTERECTOMY ([facility])      for benign tumor.  just the uterus    JOINT REPLACEMENT  2013    right knee replacement    TONSILLECTOMY  as a child    TUMOR EXCISION      benign behind right ear about      Family History   Problem Relation Age of Onset    Heart Disease Father     Hypertension Other      Social History     Tobacco Use    Smoking status: Former     Packs/day: 1.00     Years: 37.00     Pack years: 37.00     Types: Cigarettes     Start date: 1976     Quit date: 2013     Years since quittin.4     Passive exposure: Past    Smokeless tobacco: Never    Tobacco comments:     E cigarette now and then   Vaping Use    Vaping Use: Former    Quit date: 2021    Substances: Nicotine   Substance Use Topics    Alcohol use: No    Drug use: No       Allergies   Allergen Reactions    Morphine Rash     rash     Current Outpatient Medications   Medication Sig Dispense Refill    isosorbide mononitrate (IMDUR) 60 MG extended release tablet Take 2 tablets by mouth daily 180 tablet 3    metOLazone (ZAROXOLYN) 5 MG tablet Take 5mg q  30min prior to lasix 36 tablet 3    furosemide (LASIX) 20 MG tablet Lasix 60mg in AM and 40mg in PM 90 tablet 3    fluticasone-umeclidin-vilant (TRELEGY ELLIPTA) 200-62.5-25 MCG/ACT AEPB inhaler Inhale 1 puff into the lungs daily 1 each 11    clopidogrel (PLAVIX) 75 MG tablet TAKE ONE TABLET BY MOUTH DAILY 30 tablet 3    rosuvastatin (CRESTOR) 20 MG tablet Take 1 tablet by mouth daily 90 tablet 3    dilTIAZem (CARDIZEM CD) 120 MG extended release capsule TAKE ONE CAPSULE BY MOUTH EVERY MORNING AND TAKE ONE CAPSULE BY MOUTH EVERY NIGHT AT BEDTIME 180 capsule 1    hydrALAZINE (APRESOLINE) 50 MG tablet TAKE ONE TABLET BY MOUTH EVERY 8 HOURS 270 tablet 1    potassium chloride (KLOR-CON M) 20 MEQ extended release tablet TAKE 1 TABLET BY MOUTH 2 TIMES A DAY WITH MEALS 180 tablet 1    rivaroxaban (XARELTO) 20 MG TABS tablet TAKE ONE TABLET BY MOUTH DAILY WITH BREAKFAST 30 tablet 11    dofetilide (TIKOSYN) 250 MCG capsule TAKE ONE CAPSULE BY MOUTH EVERY 12 HOURS 180 capsule 3    albuterol (PROVENTIL) (2.5 MG/3ML) 0.083% nebulizer solution Take 2.5 mg by nebulization every 4 hours as needed for Wheezing      SYMBICORT 160-4.5 MCG/ACT AERO Inhale 2 puffs into the lungs in the morning and 2 puffs before bedtime. 1 each 11    vitamin C (ASCORBIC ACID) 500 MG tablet Take 500 mg by mouth daily      albuterol sulfate  (90 Base) MCG/ACT inhaler INHALE TWO PUFFS BY MOUTH EVERY 4 HOURS AS NEEDED FOR WHEEZING OR FOR SHORTNESS OF BREATH 1 each 3    Multiple Vitamins-Minerals (MULTI FOR HER 50+ PO) Take 1 tablet by mouth daily       No current facility-administered medications for this visit. Physical Exam:   /62   Pulse 70   Ht 5' 4\" (1.626 m)   Wt 227 lb (103 kg)   SpO2 98%   BMI 38.96 kg/m²   Wt Readings from Last 2 Encounters:   03/10/23 227 lb (103 kg)   03/03/23 230 lb (104.3 kg)     Physical Exam:   Constitutional: The patient is oriented to person, place, and time. Appears well-developed and well-nourished. In no acute distress. Head: Normocephalic and atraumatic. Pupils equal and round. Neck: Neck supple. Difficult to assess JVP. no carotid bruit appreciated. No mass and no thyromegaly present. No lymphadenopathy present. Cardiovascular: Normal rate and regular rhythm. Normal heart sounds. Exam reveals no gallop and no friction rub. 0-8/6 systolic murmur at the base of her heart. Pulmonary/Chest: Effort normal.  No respiratory distress.  No wheezes, no rhonchi,  + diminished breath sounds and + bibasilar crackles. Abdominal: Soft, non-tender. Bowel sounds are normal. Exhibits no organomegaly, mass or bruit. Extremities: 1-2+ BLE edema. No cyanosis or clubbing. Pulses are 2+ radial and carotid bilaterally. Neurological: No gross cranial nerve deficit. Coordination normal.   Skin: Skin is warm and dry. There is no rash or diaphoresis. Psychiatric: Patient has a normal mood and affect. Speech is normal and behavior is normal.     Lab Review:   Lab Results   Component Value Date/Time    TRIG 59 09/10/2021 07:22 AM    HDL 56 09/10/2021 07:22 AM    HDL 38 2011 03:35 PM    LDLCALC 39 09/10/2021 07:22 AM    LDLDIRECT 111 2012 11:32 AM    LABVLDL 12 09/10/2021 07:22 AM     Lab Results   Component Value Date/Time     2023 02:08 PM    K 3.8 2023 02:08 PM    K 3.8 2022 04:46 AM    CL 91 2023 02:08 PM    CO2 35 2023 02:08 PM    BUN 36 2023 02:08 PM    CREATININE 1.4 2023 02:08 PM    GLUCOSE 136 2023 02:08 PM    GLUCOSE 102 2016 01:03 PM    CALCIUM 10.0 2023 02:08 PM      Lab Results   Component Value Date    WBC 14.4 (H) 2023    HGB 9.2 (L) 2023    HCT 29.0 (L) 2023    MCV 84.1 2023     2023       EK2018: Sinus rhythm with old anterior infarct  18: Sinus Rhythm, PACs, Old anterior infarct. 19: Sinus Rhythm  20: Sinus  Rhythm  - occasional PAC, # PACs = 1, -Anteroseptal infarct -age undetermined.    -Nonspecific ST depression  -Nondiagnostic. 21: Sinus rhythm  -First degree A-V block , -Anteroseptal infarct -age undetermined. 22: SR   : controlled atrial flutter  3/3/23 not completed   3/10/23: not completed     Echo 2018:  Mild concentric left ventricular hypertrophy. Visually estimated ejection fraction of 65%. Mitral annular calcification. Mild left atrial dilation. Mild aortic stenosis.  (mean gradients 93DHMP)  The systolic pulmonary artery pressure (SPAP) estimated at 30 mmHg  (estimated RA pressure of 8 mmHg included). [facility]: ([address]) 4/2017   of RCA chronic LAD 10-20% RA 4 PA24/9 16 wedge 9 LVEDP markedly elevated 30    Carotid US 10/2017 at [address]:  1. Estimated diameter reduction of the right internal carotid artery is  less than 50%. 2.  Estimated diameter reduction of the left internal carotid artery is  50-69%. 3.  The bilateral common carotid arteries reveal no evidence of   significant stenosis. 4.  There is greater than 50% stenosis of the right external carotid  artery. 5.  There is no evidence of significant stenosis in the left external  carotid artery. 6.  The bilateral vertebral arteries are patent with antegrade flow. 7.  The bilateral subclavian arteries reveal no evidence of significant  stenosis. 8.  There has been no significant change from the previous study of  4/21/2017. Lexiscan 2/19/18   Summary    Abnormal study. There is a moderate sized, mild intensity, partially    reversible defect of the mid to apical anterior and mid anterolateral walls    which is consistent with ischemia. There is breast attenuation but stress    images appear worse. Normal LV size and systolic function. Overall findings represent an intermediate risk study     Cardiac Cath 3/5/18  OVERALL IMPRESSION  1. Again, 100% proximal right coronary artery occlusion with left to right  collaterals. 2.  Mild disease of the distal left main trunk. 3.  20% to 30% ostial left anterior descending artery stenosis with  collaterals from LAD to the right coronary artery. 4.  30% to 40% stenosis of the proximal circumflex artery. 5.  Normal left ventricular systolic function with estimated EF of 55% to  60%. 6.  Slightly enlarged aortic root with no evidence of aortic stenosis or  regurgitation. In view of the above findings, we will okay the patient for her upcoming  aortic aneurysm surgery from cardiac standpoint.     ECHO 1/2/20  There is moderate concentric left ventricular hypertrophy. Patient appears to be in atrial fibrillation. Ejection fraction is estimated to be 50-60%. Indeterminate diastolic function. Mild aortic regurgitation. Mild tricuspid regurgitation. Mild mitral regurgitation with mitral annular calcification    Right Heart Cath 2/28/2020  1.  _____ normal right cardiac pressure. 2.  Elevated pulmonary capillary wedge pressure with a mean pulmonary  capillary wedge of 29 mmHg. 3.  Normal cardiac output and indices. 4.  No oxygen step off to suggest ASD/PFO. In view of the above findings, we will start the patient on a small dose  of diuretic therapy and see whether we need to adjust some of her  antihypertensive medicines or not. Her findings are consistent with a  diastolic heart failure. Stress test: 9/6/22      Abnormal myocardial perfusion study    Moderate sized moderate intensity mid to distal anterior, chad-lateral    reversible defect suggestive of ischemia    Reversible mid to base inferior wall defect    Possible suggestion of 2 V region of ischemia    Overall findings represent a intermediate-high risk study. Suggest [facility]     Cath: 9/20/22  OVERALL IMPRESSION:  1. Mildly elevated right heart pressures. 2.  Normal cardiac output and indices. 3.  No oxygen step-up to suggest ASD or PFO. 4.  Patent left main trunk with mild disease. 5.  Tortuous LAD with mild osteal disease, not hemodynamically  significant. 6.  75% stenosis of the proximal circumflex artery with some mild  disease of the obtuse marginal branch, not hemodynamically significant. 7.  Left-to-right collaterals. 8.  100% occlusion of mid RCA. 9.  Normal left ventricular systolic function with preserved EF of 65%  to 70%.      Cath: 9/20/22 [place]    Findings:  Left Main  Distal non obstructive 30% disease, evaluated by IVUS MLA > 7.5 mm2   LAD  Moderate diffuse disease   Circ  Proximal vessel 75-80% stenosis correlating with positive functional study   Mid vessel non-obstructive disease 50%   RCA   with L-R collaterals         Interventions/Vessels  PCI performed to the proximal Lcx with DESx2   Resolute [place] 3.5x22 mm   Resolute [place] 3. 5x8 mm   Excellent post angiographic result with BALWINDER 3 flow   Guides/Wires  XB 3.5 guide catheter   Terumo RunThrough   Balanced Heavy Weight   Devices  Opticross boston IVUS   Post % Stenosis  0   Closure Device  Perclose R CFA   Complications  None      Conclusion:   Successful PCI of proximal Lcx with DESx2  Plavix loaded in cath lab  Femoral artery perclosed with excellent hemostasis  3H bedrest  Home this evening, follow up in 1-2 weeks    Echo: 11/3/22   Borderline conc. LVH with normal wall motion; EF 70%. Grade II diastolic dysfunction with elevated LH filling pressures. The left atrium is severely dilated. The right ventricle is normal in size and function. Mild mitral, aortic and tricuspid regurgitation. [address] 11/4/22  Findings:  RA  5   RV  53/9   PA  49/20 (28)   PCWP  25   prison  3.8    Conclusion: Post capillary pulmonary hypertension, Recommend continued diuresis     Assessment/Plan:       Chronic diastolic heart failure  -No aldactone secondary to elevated Cr.   -as of 9/29/22 she had been admitted x2 last 1 month to [facility]  -referred for consideration candidacy for CardioMEMS HF implant  -she needed out patient ischemic workup--9/6/22 abnormal  Lexiscan myoview followed by a   -[facility] 9/20/22 s/p successful PC proximal Lcx with D#ES x2. LM 30% disease, LAD moderate diffuse dx, mid vessal 50% ,  with L-R collaterals of the RCA.   -11/28/22 s/p CardioMEMS HF implant     -Today, patient and  report increase in urination over last week. Also with slight improvement in LI and BLE edema.    -per our scale, she is down 3# for 1 week visit.   -labs reviewed again 3/3/2023  -PAD 30 last 4 days per CardioMEMS HF downloads.   -Currently on isosorbide, tikosyn, diltiazem,  hydralazine, Imdur.   -instructed today do not give Zaroxolyn/metolazone 5 mg. Tomorrow (Sat. 3/11) -give 5 mg Zaroxolyn/metolazone, 30 minutes prior to morning Lasix/furosemide dose. Then start on Monday, Wednesday and Friday take Zaroxolyn/metolazone 5 mg, 30 minutes prior to morning Lasix/furosemide dose. -Increase Imdur/isosorbide from 60 mg daily to 120 mg daily for vasodilatory effect and hopefully reduce pulmonary artery pressures  - Repeat labs next week -Monday or Tuesday (BMP, BNP)   -Follow up next Friday with Dr. Asha Jessica. -We will consider SGLT2 inhibitor therapy after reviewing her renal function  -Encouraged medication compliance, low salt diet with hx of indiscretions, compression stockings and elevating legs.   -Follow up again in 1 week. If worsens over the weekend, she has been instructed to proceed to the ER or call 911. NYHA class III-IV     Coronary artery disease involving native coronary artery of native heart without angina pectoris  -She previously reported myalgias with Crestor with resolution of cramping after discontinuing. -[facility] 9/20/22 s/p PCI of proximal Lcx with DESx2 by Dr. Bradley Jovel  -[address] 11/4/22 acute on chronic diastolic heart failure (with acute on chronic respiratory failure)  -also episode of symptomatic AFIB during hospitalization.     -Today, denies any angina however, continues with above symptoms reported. -continues to wear 3-4L of oxygen   -remain on triple therapy; Plavix, , rosuvastatin  -on Xarelto and Plavix. Essential hypertension  -Blood pressure is stable today on medical therapy. Encouraged on low salt diet. Paroxysmal atrial fibrillation   -CHADS VASC score 7 for age, gender, DVT, CHF, HTN, CAD.   -Managed per Dr. Blanca Fraction.  S/p PVI ablation 10/2020, she then had Afib ablation last year but had recurrence of A fib She underwent DCCV 1/12/21,  repeat AFIB ablation 2/17/21, DCCV 4/2022.   -5/24/22 admitted for dofetilide loading after having multiple ablations, cardioversions and failing flecainide therapy. She was started on 250mcg of dofetilide Q12H. Her QTc was monitored closely without any significant changes or need for dose adjustment. Electrolytes also monitored daily and replaced if needed. She did convert to sinus rhythm on 5/25/22 with improvement in symptoms. QTc remains acceptable today. She will be discharged on 250mcg of dofetilide Q12H with a BMP in 1 week.   -11/2022 admitted for acute on chronic respiratory failure, acute on chronic diastolic heart failure went into symptomatic rapid atrial fibrillation. -worsening heart failure, hypervolemic today.   -physical exam as above with regular rate and rhythm. -remains oxygen therapy per [place] 3-4L   -Xarelto for [address] [place] [address]. Extremity with superficial bruising. Denies bleeding. a  -normal rate and rhythm per physical exam.    Systolic murmur  Echo reviewed 11/2/22. Which showed no significant valvular abnormalities    Severe COPD/Asthma/chronic respiratory failure  managed per Dr. Kofi Babin. Keep f/u with pulmonology. AAA (abdominal aortic aneurysm) without rupture   -Underwent endovascular AAA repair on 3/21/18 by Dr. Nicolas Gurrola. Follows vascular surgery    Sleep apnea  -Intolerant to CPAP. Anemia with hx of Epistaxis  -(Plavix and Xarelto) for CAD with recent stent placement and Afib.   -may stop aspirin on 10/20/22. -CBC with next blood draw. Complexity of medical decision making is extremely high due to multiple complex medical problems including congestive heart failure, COPD, hypertension chronic kidney disease and CAD    Follow up planned for 1 week. If worsens over the weekend, she has been instructed to proceed to the ER or call 911. Thank you very much for allowing me to participate in the care of your patient. Please do not hesitate to contact me if you have any questions.     Sincerely,  Arvin Dejesus MD      [address]  [facility], [address] [place] [address]  Ph: [phone]  Fax: [phone]    This note was scribed in the presence of Dr. Scar Allen MD by Robbie Maldonado RN. Physician Attestation:  The scribes documentation has been prepared under my direction and personally reviewed by me in its entirety. I confirm that the note above accurately reflects all work, treatment, procedures, and medical decision making performed by me.

## 2023-03-10 NOTE — PATIENT INSTRUCTIONS
1) Today (Friday 3/10)- do not give Zaroxolyn/metolazone   2) Tomorrow (Sat. 3/11) -give 5 mg Zaroxolyn/metolazone, 30 minutes prior to morning Lasix/furosemide dose. 3) Then start on Monday, Wednesday and Friday take Zaroxolyn/metolazone 5 mg, 30 minutes prior to morning Lasix/furosemide dose. 4) Increase Imdur/isosorbide from 60 mg daily to 120 mg daily. 5) Repeat labs next week -Monday or Tuesday (BMP, BNP, CBC) -orders given  6) Follow up next Friday with Dr. Verena Penny.

## 2023-03-13 ENCOUNTER — TELEPHONE (OUTPATIENT)
Dept: FAMILY MEDICINE CLINIC | Age: 78
End: 2023-03-13

## 2023-03-14 DIAGNOSIS — D64.9 CHRONIC ANEMIA: ICD-10-CM

## 2023-03-14 DIAGNOSIS — I50.33 ACUTE ON CHRONIC DIASTOLIC CONGESTIVE HEART FAILURE (HCC): Primary | ICD-10-CM

## 2023-03-14 DIAGNOSIS — N18.9 CHRONIC KIDNEY DISEASE, UNSPECIFIED CKD STAGE: ICD-10-CM

## 2023-03-14 DIAGNOSIS — I10 ESSENTIAL HYPERTENSION: ICD-10-CM

## 2023-03-14 DIAGNOSIS — R06.09 DYSPNEA ON EXERTION: ICD-10-CM

## 2023-03-14 DIAGNOSIS — R79.89 ELEVATED BRAIN NATRIURETIC PEPTIDE (BNP) LEVEL: ICD-10-CM

## 2023-03-14 DIAGNOSIS — I50.33 ACUTE ON CHRONIC DIASTOLIC CONGESTIVE HEART FAILURE (HCC): ICD-10-CM

## 2023-03-14 DIAGNOSIS — R60.9 SWELLING: ICD-10-CM

## 2023-03-14 DIAGNOSIS — R63.5 WEIGHT GAIN: ICD-10-CM

## 2023-03-14 LAB
ANION GAP SERPL CALCULATED.3IONS-SCNC: 16 MMOL/L (ref 3–16)
BUN SERPL-MCNC: 36 MG/DL (ref 7–20)
CALCIUM SERPL-MCNC: 9.5 MG/DL (ref 8.3–10.6)
CHLORIDE SERPL-SCNC: 89 MMOL/L (ref 99–110)
CO2 SERPL-SCNC: 36 MMOL/L (ref 21–32)
CREAT SERPL-MCNC: 1.4 MG/DL (ref 0.6–1.2)
DEPRECATED RDW RBC AUTO: 18.3 % (ref 12.4–15.4)
GFR SERPLBLD CREATININE-BSD FMLA CKD-EPI: 39 ML/MIN/{1.73_M2}
GLUCOSE SERPL-MCNC: 129 MG/DL (ref 70–99)
HCT VFR BLD AUTO: 29.3 % (ref 36–48)
HGB BLD-MCNC: 9.1 G/DL (ref 12–16)
MCH RBC QN AUTO: 26.2 PG (ref 26–34)
MCHC RBC AUTO-ENTMCNC: 31.1 G/DL (ref 31–36)
MCV RBC AUTO: 84.3 FL (ref 80–100)
NT-PROBNP SERPL-MCNC: 6301 PG/ML (ref 0–449)
PLATELET # BLD AUTO: 282 K/UL (ref 135–450)
PMV BLD AUTO: 9.5 FL (ref 5–10.5)
POTASSIUM SERPL-SCNC: 3.3 MMOL/L (ref 3.5–5.1)
RBC # BLD AUTO: 3.47 M/UL (ref 4–5.2)
SODIUM SERPL-SCNC: 141 MMOL/L (ref 136–145)
WBC # BLD AUTO: 12.4 K/UL (ref 4–11)

## 2023-03-14 RX ORDER — POTASSIUM CHLORIDE 20 MEQ/1
TABLET, EXTENDED RELEASE ORAL
Qty: 120 TABLET | Refills: 3 | Status: SHIPPED | OUTPATIENT
Start: 2023-03-14

## 2023-03-14 RX ORDER — FUROSEMIDE 20 MG/1
60 TABLET ORAL 2 TIMES DAILY
Qty: 180 TABLET | Refills: 3 | Status: SHIPPED | OUTPATIENT
Start: 2023-03-14

## 2023-03-16 ENCOUNTER — TELEPHONE (OUTPATIENT)
Dept: CARDIOLOGY CLINIC | Age: 78
End: 2023-03-16

## 2023-03-16 DIAGNOSIS — I50.33 ACUTE ON CHRONIC DIASTOLIC CONGESTIVE HEART FAILURE (HCC): Primary | ICD-10-CM

## 2023-03-16 NOTE — TELEPHONE ENCOUNTER
Dr. Esteban Massey,     Please review. Update as you are seeing Erlinda Burr tomorrow at [place]. Lizett, RN will be with you. CardioMEMS HF:     Goal 20 Range 15-25    PAD/PA Mean:   3/16 34/50  3/15 27/40  3/14 28/45  3/13 31/48  3/12 32/47  3/11 29/45  3/9 30/44      Labs   3/14/23 K 3.3, , BUN/Cr 36/1.4, proBNP 6301-INCREASE Lasix to 60 mg BID, INCREASE KCL 20 meq tabs 2 tabs BID, continue Zaroxolyn 5 mg MWF and recheck BMP, BNP, CBC Friday 3/17/23.   3/3/23 K 3.8, , BUN/Cr 36/1.4, proBNP 2908      Also updated appt notes as a reminder.

## 2023-03-17 NOTE — TELEPHONE ENCOUNTER
Dr. Silvia Velasquez,    Patient cancelled her appointment in [facility] reportint to the  that she was not feeling well and she would call back and reschedule follow up. Would you like to address Cardiomems readings over phone.     Please advise,  Thanks,  Ashley Woodson RN

## 2023-03-17 NOTE — TELEPHONE ENCOUNTER
Discussed with Dr. Kim Fairabnks. I called and spoke with patient she reports pitting edema in her feet which she states is not new for her. She denies shortness of breath and chest pain /pressure. Orders received from Dr. Kim Fairbanks to increase Lasix to 80 mg in AM and 60 mg in PM. Repeat BMP, BNP on Monday. Spoke with patient advised of changes and instructed to go to ED with any worsening symptoms verbalized understanding.

## 2023-03-21 DIAGNOSIS — I50.33 ACUTE ON CHRONIC DIASTOLIC CONGESTIVE HEART FAILURE (HCC): ICD-10-CM

## 2023-03-21 NOTE — TELEPHONE ENCOUNTER
CHANDA Chu  Just wanted to update you on what has been ongoing with Early Hurt. F/u with Dr. Hernandez Crockett 3/24/23. Thanks.

## 2023-03-22 LAB
ANION GAP SERPL CALCULATED.3IONS-SCNC: 22 MMOL/L (ref 3–16)
BUN SERPL-MCNC: 43 MG/DL (ref 7–20)
CALCIUM SERPL-MCNC: 10 MG/DL (ref 8.3–10.6)
CHLORIDE SERPL-SCNC: 90 MMOL/L (ref 99–110)
CO2 SERPL-SCNC: 34 MMOL/L (ref 21–32)
CREAT SERPL-MCNC: 1.4 MG/DL (ref 0.6–1.2)
GFR SERPLBLD CREATININE-BSD FMLA CKD-EPI: 39 ML/MIN/{1.73_M2}
GLUCOSE SERPL-MCNC: 111 MG/DL (ref 70–99)
NT-PROBNP SERPL-MCNC: 3299 PG/ML (ref 0–449)
POTASSIUM SERPL-SCNC: 3.5 MMOL/L (ref 3.5–5.1)
SODIUM SERPL-SCNC: 146 MMOL/L (ref 136–145)

## 2023-03-24 ENCOUNTER — OFFICE VISIT (OUTPATIENT)
Dept: CARDIOLOGY CLINIC | Age: 78
End: 2023-03-24

## 2023-03-24 ENCOUNTER — TELEPHONE (OUTPATIENT)
Dept: CARDIOLOGY CLINIC | Age: 78
End: 2023-03-24

## 2023-03-24 VITALS
HEIGHT: 64 IN | HEART RATE: 70 BPM | DIASTOLIC BLOOD PRESSURE: 78 MMHG | SYSTOLIC BLOOD PRESSURE: 136 MMHG | BODY MASS INDEX: 39.78 KG/M2 | OXYGEN SATURATION: 99 % | WEIGHT: 233 LBS

## 2023-03-24 DIAGNOSIS — R01.1 SYSTOLIC MURMUR: ICD-10-CM

## 2023-03-24 DIAGNOSIS — J96.10 CHRONIC RESPIRATORY FAILURE, UNSPECIFIED WHETHER WITH HYPOXIA OR HYPERCAPNIA (HCC): ICD-10-CM

## 2023-03-24 DIAGNOSIS — I48.19 PERSISTENT ATRIAL FIBRILLATION (HCC): ICD-10-CM

## 2023-03-24 DIAGNOSIS — J44.9 COPD, SEVERE (HCC): ICD-10-CM

## 2023-03-24 DIAGNOSIS — R60.9 SWELLING: ICD-10-CM

## 2023-03-24 DIAGNOSIS — I25.10 CORONARY ARTERY DISEASE INVOLVING NATIVE CORONARY ARTERY OF NATIVE HEART WITHOUT ANGINA PECTORIS: ICD-10-CM

## 2023-03-24 DIAGNOSIS — I10 ESSENTIAL HYPERTENSION: ICD-10-CM

## 2023-03-24 DIAGNOSIS — D64.9 CHRONIC ANEMIA: ICD-10-CM

## 2023-03-24 DIAGNOSIS — R63.5 WEIGHT GAIN: ICD-10-CM

## 2023-03-24 DIAGNOSIS — I50.33 ACUTE ON CHRONIC DIASTOLIC CONGESTIVE HEART FAILURE (HCC): Primary | ICD-10-CM

## 2023-03-24 DIAGNOSIS — R06.09 DYSPNEA ON EXERTION: ICD-10-CM

## 2023-03-24 RX ORDER — CLOBETASOL PROPIONATE 0.5 MG/G
1 CREAM TOPICAL DAILY PRN
COMMUNITY
Start: 2023-03-10

## 2023-03-24 NOTE — PROGRESS NOTES
diffuse dx, mid vessal 50% ,  with L-R collaterals of the RCA.   -11/28/22 s/p CardioMEMS HF implant     Today, weight is up 5#, continues to complaint of SOB, worsens with activity, BLE edema. 3/21/23 BUN/Cr 43/1.4, proBNP 3299 after increasing her Lasix to 80 mg a.m and 60 mg p.m remaining on metolazone 5 mg MWF.   -physical exam today shows bilateral crackles and increasing lower extremity edema suggestive of decompensated heart failure  -CardioMEMS HF PAD readings 3/24 29, 3/23 30, 3/22 28. Even though her BNP is significantly reduced, she appears decompensated on physical exam today. I have offered her to consider IV Lasix therapy Monday and Wednesday Friday Thursday at the [facility] preceded by [facility] or get admitted to the hospital for CHF decompensation. She is currently opted to consider IV Lasix therapy at the [facility].    -[facility], do not take a.m po dose lasix on those days. Take metolazone 5 mg daily, including Monday and Thursday with repeat BMP, BNP on Thursday.   -We discussed her pulmonary and cardiac disease at length with her renal function.   -Follow up again in 1 week. If worsens over the weekend, she has been instructed to proceed to the ER or call 911.   -Currently on isosorbide, tikosyn, diltiazem,  hydralazine, Imdur. -We will consider SGLT2 inhibitor therapy after reviewing her renal function  -Encouraged medication compliance, low salt diet with hx of indiscretions, compression stockings and elevating legs. Her overall prognosis is poor. NYHA class III-IV     Coronary artery disease involving native coronary artery of native heart without angina pectoris  -She previously reported myalgias with Crestor with resolution of cramping after discontinuing.    -[facility] 9/20/22 s/p PCI of proximal Lcx with DESx2 by Dr. Stacey Hewitt  -[address] 11/4/22 acute on chronic diastolic heart failure (with acute on

## 2023-03-24 NOTE — PATIENT INSTRUCTIONS
1) Taylor Ferry, RN will contact [facility] [place] [facility]. 2) Infusion center will reach out to you to schedule for IV Lasix/furosemide next week per Dr. Oconnell Common instructions. 3) The plan will be on Monday's and Thursday's for the next 2 weeks (4 times) to go to the infusion center for IV lasix. \  4) DO NOT TAKE morning dose of furosemide/Lasix 80 mg daily on days you go to the infusion center but you will take your after noon dose. 5)  DO TAKE metolazone/zaroxolyn 5 mg daily prior to morning Lasix dose - for both oral and IV. 6) On days you do not go to the infusion center take your metolazone/zaroxolyn AND furosemide/Lasix as you normally do  Lasix 80 mg a.m. and 60 mg p.m. with metolazone 5 mg 30 minutes prior to a.m. Lasix dose. 7) Keep working on a low salt diet  8) Daily weights and Daily CardioMEMS heart failure download  8) Plan follow up in 2 weeks with Dr. Scarlett Luciano.

## 2023-03-24 NOTE — TELEPHONE ENCOUNTER
Refer to progress note 3/24/2023 per Dr. Elmira Joyner. Spoke with [employer] for IV Lasix  Order faxed and will have scanned into chart once fax confirmation received.    Give Monday 3/27/23 2pm arrival.

## 2023-03-26 ENCOUNTER — HOSPITAL ENCOUNTER (INPATIENT)
Age: 78
LOS: 2 days | Discharge: HOME | DRG: 291 | End: 2023-03-28
Attending: EMERGENCY MEDICINE | Admitting: STUDENT IN AN ORGANIZED HEALTH CARE EDUCATION/TRAINING PROGRAM
Payer: MEDICARE

## 2023-03-26 ENCOUNTER — APPOINTMENT (OUTPATIENT)
Dept: GENERAL RADIOLOGY | Age: 78
DRG: 291 | End: 2023-03-26
Payer: MEDICARE

## 2023-03-26 ENCOUNTER — APPOINTMENT (OUTPATIENT)
Dept: CT IMAGING | Age: 78
DRG: 291 | End: 2023-03-26
Payer: MEDICARE

## 2023-03-26 DIAGNOSIS — I50.22 CHRONIC SYSTOLIC (CONGESTIVE) HEART FAILURE (HCC): ICD-10-CM

## 2023-03-26 DIAGNOSIS — I50.9 CONGESTIVE HEART FAILURE, UNSPECIFIED HF CHRONICITY, UNSPECIFIED HEART FAILURE TYPE (HCC): ICD-10-CM

## 2023-03-26 DIAGNOSIS — J44.1 COPD EXACERBATION (HCC): Primary | ICD-10-CM

## 2023-03-26 PROBLEM — J96.20 ACUTE ON CHRONIC RESPIRATORY FAILURE (HCC): Status: ACTIVE | Noted: 2023-03-26

## 2023-03-26 LAB
ANION GAP SERPL CALCULATED.3IONS-SCNC: 12 MMOL/L (ref 3–16)
BASE EXCESS BLDV CALC-SCNC: 20.4 MMOL/L
BASOPHILS # BLD: 0.1 K/UL (ref 0–0.2)
BASOPHILS NFR BLD: 0.6 %
BUN SERPL-MCNC: 34 MG/DL (ref 7–20)
CALCIUM SERPL-MCNC: 9.2 MG/DL (ref 8.3–10.6)
CHLORIDE SERPL-SCNC: 87 MMOL/L (ref 99–110)
CO2 BLDV-SCNC: 49 MMOL/L
CO2 SERPL-SCNC: 38 MMOL/L (ref 21–32)
COHGB MFR BLDV: 1.5 %
CREAT SERPL-MCNC: 1.5 MG/DL (ref 0.6–1.2)
DEPRECATED RDW RBC AUTO: 19.2 % (ref 12.4–15.4)
EOSINOPHIL # BLD: 0.2 K/UL (ref 0–0.6)
EOSINOPHIL NFR BLD: 1.4 %
GFR SERPLBLD CREATININE-BSD FMLA CKD-EPI: 36 ML/MIN/{1.73_M2}
GLUCOSE SERPL-MCNC: 108 MG/DL (ref 70–99)
HCO3 BLDV-SCNC: 47 MMOL/L (ref 23–29)
HCT VFR BLD AUTO: 26.8 % (ref 36–48)
HGB BLD-MCNC: 8.5 G/DL (ref 12–16)
LYMPHOCYTES # BLD: 0.8 K/UL (ref 1–5.1)
LYMPHOCYTES NFR BLD: 6.8 %
MAGNESIUM SERPL-MCNC: 2.2 MG/DL (ref 1.8–2.4)
MCH RBC QN AUTO: 26.1 PG (ref 26–34)
MCHC RBC AUTO-ENTMCNC: 31.7 G/DL (ref 31–36)
MCV RBC AUTO: 82.4 FL (ref 80–100)
METHGB MFR BLDV: 0.4 %
MONOCYTES # BLD: 1 K/UL (ref 0–1.3)
MONOCYTES NFR BLD: 8.4 %
NEUTROPHILS # BLD: 10.2 K/UL (ref 1.7–7.7)
NEUTROPHILS NFR BLD: 82.8 %
NT-PROBNP SERPL-MCNC: 3436 PG/ML (ref 0–449)
O2 THERAPY: ABNORMAL
PCO2 BLDV: 69.2 MMHG (ref 40–50)
PH BLDV: 7.44 [PH] (ref 7.35–7.45)
PLATELET # BLD AUTO: 269 K/UL (ref 135–450)
PMV BLD AUTO: 9.1 FL (ref 5–10.5)
PO2 BLDV: 32 MMHG
POTASSIUM SERPL-SCNC: 3.4 MMOL/L (ref 3.5–5.1)
RBC # BLD AUTO: 3.25 M/UL (ref 4–5.2)
SAO2 % BLDV: 56 %
SODIUM SERPL-SCNC: 137 MMOL/L (ref 136–145)
TROPONIN T SERPL-MCNC: 0.04 NG/ML
TROPONIN T SERPL-MCNC: 0.05 NG/ML
WBC # BLD AUTO: 12.3 K/UL (ref 4–11)

## 2023-03-26 PROCEDURE — 6360000002 HC RX W HCPCS: Performed by: STUDENT IN AN ORGANIZED HEALTH CARE EDUCATION/TRAINING PROGRAM

## 2023-03-26 PROCEDURE — 83735 ASSAY OF MAGNESIUM: CPT

## 2023-03-26 PROCEDURE — 6370000000 HC RX 637 (ALT 250 FOR IP): Performed by: STUDENT IN AN ORGANIZED HEALTH CARE EDUCATION/TRAINING PROGRAM

## 2023-03-26 PROCEDURE — 71046 X-RAY EXAM CHEST 2 VIEWS: CPT

## 2023-03-26 PROCEDURE — 83880 ASSAY OF NATRIURETIC PEPTIDE: CPT

## 2023-03-26 PROCEDURE — 2580000003 HC RX 258: Performed by: STUDENT IN AN ORGANIZED HEALTH CARE EDUCATION/TRAINING PROGRAM

## 2023-03-26 PROCEDURE — 94640 AIRWAY INHALATION TREATMENT: CPT

## 2023-03-26 PROCEDURE — 84484 ASSAY OF TROPONIN QUANT: CPT

## 2023-03-26 PROCEDURE — 96375 TX/PRO/DX INJ NEW DRUG ADDON: CPT

## 2023-03-26 PROCEDURE — 96365 THER/PROPH/DIAG IV INF INIT: CPT

## 2023-03-26 PROCEDURE — 99285 EMERGENCY DEPT VISIT HI MDM: CPT

## 2023-03-26 PROCEDURE — 93005 ELECTROCARDIOGRAM TRACING: CPT | Performed by: EMERGENCY MEDICINE

## 2023-03-26 PROCEDURE — 85025 COMPLETE CBC W/AUTO DIFF WBC: CPT

## 2023-03-26 PROCEDURE — 71250 CT THORAX DX C-: CPT

## 2023-03-26 PROCEDURE — 82803 BLOOD GASES ANY COMBINATION: CPT

## 2023-03-26 PROCEDURE — 6370000000 HC RX 637 (ALT 250 FOR IP): Performed by: EMERGENCY MEDICINE

## 2023-03-26 PROCEDURE — 2060000000 HC ICU INTERMEDIATE R&B

## 2023-03-26 PROCEDURE — 80048 BASIC METABOLIC PNL TOTAL CA: CPT

## 2023-03-26 RX ORDER — DEXTROSE MONOHYDRATE 25 G/50ML
INJECTION, SOLUTION INTRAVENOUS
Status: DISPENSED
Start: 2023-03-26 | End: 2023-03-27

## 2023-03-26 RX ORDER — IPRATROPIUM BROMIDE AND ALBUTEROL SULFATE 2.5; .5 MG/3ML; MG/3ML
1 SOLUTION RESPIRATORY (INHALATION) ONCE
Status: COMPLETED | OUTPATIENT
Start: 2023-03-26 | End: 2023-03-26

## 2023-03-26 RX ORDER — METHYLPREDNISOLONE SODIUM SUCCINATE 125 MG/2ML
125 INJECTION, POWDER, LYOPHILIZED, FOR SOLUTION INTRAMUSCULAR; INTRAVENOUS ONCE
Status: COMPLETED | OUTPATIENT
Start: 2023-03-26 | End: 2023-03-26

## 2023-03-26 RX ORDER — FUROSEMIDE 10 MG/ML
60 INJECTION INTRAMUSCULAR; INTRAVENOUS ONCE
Status: COMPLETED | OUTPATIENT
Start: 2023-03-26 | End: 2023-03-26

## 2023-03-26 RX ORDER — DIPHENHYDRAMINE HCL 25 MG
25 TABLET ORAL ONCE
Status: COMPLETED | OUTPATIENT
Start: 2023-03-26 | End: 2023-03-26

## 2023-03-26 RX ADMIN — METHYLPREDNISOLONE SODIUM SUCCINATE 125 MG: 125 INJECTION, POWDER, FOR SOLUTION INTRAMUSCULAR; INTRAVENOUS at 22:51

## 2023-03-26 RX ADMIN — DIPHENHYDRAMINE HCL 25 MG: 25 TABLET ORAL at 22:50

## 2023-03-26 RX ADMIN — FUROSEMIDE 60 MG: 10 INJECTION, SOLUTION INTRAMUSCULAR; INTRAVENOUS at 23:54

## 2023-03-26 RX ADMIN — POTASSIUM BICARBONATE 40 MEQ: 782 TABLET, EFFERVESCENT ORAL at 22:50

## 2023-03-26 RX ADMIN — IPRATROPIUM BROMIDE AND ALBUTEROL SULFATE 1 AMPULE: .5; 3 SOLUTION RESPIRATORY (INHALATION) at 20:42

## 2023-03-26 RX ADMIN — AZITHROMYCIN DIHYDRATE 500 MG: 500 INJECTION, POWDER, LYOPHILIZED, FOR SOLUTION INTRAVENOUS at 23:03

## 2023-03-26 ASSESSMENT — ENCOUNTER SYMPTOMS
COUGH: 0
SHORTNESS OF BREATH: 1
ABDOMINAL PAIN: 0

## 2023-03-26 ASSESSMENT — LIFESTYLE VARIABLES
HOW OFTEN DO YOU HAVE A DRINK CONTAINING ALCOHOL: NEVER
HOW MANY STANDARD DRINKS CONTAINING ALCOHOL DO YOU HAVE ON A TYPICAL DAY: PATIENT DOES NOT DRINK

## 2023-03-27 ENCOUNTER — HOSPITAL ENCOUNTER (OUTPATIENT)
Dept: INFUSION THERAPY | Age: 78
Setting detail: INFUSION SERIES
Discharge: HOME | End: 2023-03-27

## 2023-03-27 DIAGNOSIS — I50.33 ACUTE ON CHRONIC DIASTOLIC HEART FAILURE (HCC): Primary | ICD-10-CM

## 2023-03-27 PROBLEM — E87.6 HYPOKALEMIA: Status: ACTIVE | Noted: 2023-03-27

## 2023-03-27 PROBLEM — J96.12 CHRONIC HYPERCAPNIC RESPIRATORY FAILURE (HCC): Status: ACTIVE | Noted: 2023-03-27

## 2023-03-27 PROBLEM — I50.9 CONGESTIVE HEART FAILURE (HCC): Status: ACTIVE | Noted: 2023-03-27

## 2023-03-27 LAB
ALBUMIN SERPL-MCNC: 3.7 G/DL (ref 3.4–5)
ALBUMIN SERPL-MCNC: 3.8 G/DL (ref 3.4–5)
ALBUMIN/GLOB SERPL: 1.4 {RATIO} (ref 1.1–2.2)
ALP SERPL-CCNC: 73 U/L (ref 40–129)
ALT SERPL-CCNC: 13 U/L (ref 10–40)
ANION GAP SERPL CALCULATED.3IONS-SCNC: 12 MMOL/L (ref 3–16)
ANION GAP SERPL CALCULATED.3IONS-SCNC: 21 MMOL/L (ref 3–16)
AST SERPL-CCNC: 23 U/L (ref 15–37)
BASOPHILS # BLD: 0 K/UL (ref 0–0.2)
BASOPHILS NFR BLD: 0 %
BILIRUB SERPL-MCNC: <0.2 MG/DL (ref 0–1)
BUN SERPL-MCNC: 39 MG/DL (ref 7–20)
BUN SERPL-MCNC: 39 MG/DL (ref 7–20)
CALCIUM SERPL-MCNC: 9 MG/DL (ref 8.3–10.6)
CALCIUM SERPL-MCNC: 9 MG/DL (ref 8.3–10.6)
CHLORIDE SERPL-SCNC: 89 MMOL/L (ref 99–110)
CHLORIDE SERPL-SCNC: 90 MMOL/L (ref 99–110)
CO2 SERPL-SCNC: 31 MMOL/L (ref 21–32)
CO2 SERPL-SCNC: 38 MMOL/L (ref 21–32)
CREAT SERPL-MCNC: 1.5 MG/DL (ref 0.6–1.2)
CREAT SERPL-MCNC: 1.6 MG/DL (ref 0.6–1.2)
DEPRECATED RDW RBC AUTO: 18.8 % (ref 12.4–15.4)
EKG ATRIAL RATE: 74 BPM
EKG ATRIAL RATE: 74 BPM
EKG DIAGNOSIS: NORMAL
EKG DIAGNOSIS: NORMAL
EKG P AXIS: 60 DEGREES
EKG P AXIS: 68 DEGREES
EKG P-R INTERVAL: 236 MS
EKG P-R INTERVAL: 250 MS
EKG Q-T INTERVAL: 428 MS
EKG Q-T INTERVAL: 430 MS
EKG QRS DURATION: 86 MS
EKG QRS DURATION: 90 MS
EKG QTC CALCULATION (BAZETT): 475 MS
EKG QTC CALCULATION (BAZETT): 477 MS
EKG R AXIS: -18 DEGREES
EKG R AXIS: -18 DEGREES
EKG T AXIS: 13 DEGREES
EKG T AXIS: 29 DEGREES
EKG VENTRICULAR RATE: 74 BPM
EKG VENTRICULAR RATE: 74 BPM
EOSINOPHIL # BLD: 0 K/UL (ref 0–0.6)
EOSINOPHIL NFR BLD: 0.2 %
FERRITIN SERPL IA-MCNC: 65.7 NG/ML (ref 15–150)
GFR SERPLBLD CREATININE-BSD FMLA CKD-EPI: 33 ML/MIN/{1.73_M2}
GFR SERPLBLD CREATININE-BSD FMLA CKD-EPI: 36 ML/MIN/{1.73_M2}
GLUCOSE SERPL-MCNC: 159 MG/DL (ref 70–99)
GLUCOSE SERPL-MCNC: 161 MG/DL (ref 70–99)
HCT VFR BLD AUTO: 26.5 % (ref 36–48)
HGB BLD-MCNC: 8.5 G/DL (ref 12–16)
IRON SATN MFR SERPL: 11 % (ref 15–50)
IRON SERPL-MCNC: 35 UG/DL (ref 37–145)
LYMPHOCYTES # BLD: 0.4 K/UL (ref 1–5.1)
LYMPHOCYTES NFR BLD: 4 %
MAGNESIUM SERPL-MCNC: 2.1 MG/DL (ref 1.8–2.4)
MCH RBC QN AUTO: 26.8 PG (ref 26–34)
MCHC RBC AUTO-ENTMCNC: 32 G/DL (ref 31–36)
MCV RBC AUTO: 83.7 FL (ref 80–100)
MONOCYTES # BLD: 0.1 K/UL (ref 0–1.3)
MONOCYTES NFR BLD: 0.9 %
NEUTROPHILS # BLD: 8.5 K/UL (ref 1.7–7.7)
NEUTROPHILS NFR BLD: 94.9 %
PHOSPHATE SERPL-MCNC: 3.5 MG/DL (ref 2.5–4.9)
PLATELET # BLD AUTO: 245 K/UL (ref 135–450)
PMV BLD AUTO: 9.6 FL (ref 5–10.5)
POTASSIUM SERPL-SCNC: 3.2 MMOL/L (ref 3.5–5.1)
POTASSIUM SERPL-SCNC: 3.4 MMOL/L (ref 3.5–5.1)
PROT SERPL-MCNC: 6.6 G/DL (ref 6.4–8.2)
RBC # BLD AUTO: 3.16 M/UL (ref 4–5.2)
SODIUM SERPL-SCNC: 140 MMOL/L (ref 136–145)
SODIUM SERPL-SCNC: 141 MMOL/L (ref 136–145)
TIBC SERPL-MCNC: 331 UG/DL (ref 260–445)
TSH SERPL DL<=0.005 MIU/L-ACNC: 0.86 UIU/ML (ref 0.27–4.2)
WBC # BLD AUTO: 9 K/UL (ref 4–11)

## 2023-03-27 PROCEDURE — 83550 IRON BINDING TEST: CPT

## 2023-03-27 PROCEDURE — 36415 COLL VENOUS BLD VENIPUNCTURE: CPT

## 2023-03-27 PROCEDURE — 82728 ASSAY OF FERRITIN: CPT

## 2023-03-27 PROCEDURE — 6360000002 HC RX W HCPCS: Performed by: STUDENT IN AN ORGANIZED HEALTH CARE EDUCATION/TRAINING PROGRAM

## 2023-03-27 PROCEDURE — 6370000000 HC RX 637 (ALT 250 FOR IP): Performed by: STUDENT IN AN ORGANIZED HEALTH CARE EDUCATION/TRAINING PROGRAM

## 2023-03-27 PROCEDURE — 2700000000 HC OXYGEN THERAPY PER DAY

## 2023-03-27 PROCEDURE — 93010 ELECTROCARDIOGRAM REPORT: CPT | Performed by: INTERNAL MEDICINE

## 2023-03-27 PROCEDURE — 2580000003 HC RX 258: Performed by: STUDENT IN AN ORGANIZED HEALTH CARE EDUCATION/TRAINING PROGRAM

## 2023-03-27 PROCEDURE — 99223 1ST HOSP IP/OBS HIGH 75: CPT | Performed by: INTERNAL MEDICINE

## 2023-03-27 PROCEDURE — 83735 ASSAY OF MAGNESIUM: CPT

## 2023-03-27 PROCEDURE — 6360000002 HC RX W HCPCS: Performed by: INTERNAL MEDICINE

## 2023-03-27 PROCEDURE — 84443 ASSAY THYROID STIM HORMONE: CPT

## 2023-03-27 PROCEDURE — 2060000000 HC ICU INTERMEDIATE R&B

## 2023-03-27 PROCEDURE — 94640 AIRWAY INHALATION TREATMENT: CPT

## 2023-03-27 PROCEDURE — 83540 ASSAY OF IRON: CPT

## 2023-03-27 PROCEDURE — 80053 COMPREHEN METABOLIC PANEL: CPT

## 2023-03-27 PROCEDURE — 6370000000 HC RX 637 (ALT 250 FOR IP): Performed by: INTERNAL MEDICINE

## 2023-03-27 PROCEDURE — 2580000003 HC RX 258: Performed by: INTERNAL MEDICINE

## 2023-03-27 PROCEDURE — 94761 N-INVAS EAR/PLS OXIMETRY MLT: CPT

## 2023-03-27 PROCEDURE — 85025 COMPLETE CBC W/AUTO DIFF WBC: CPT

## 2023-03-27 RX ORDER — DOFETILIDE 0.25 MG/1
250 CAPSULE ORAL 2 TIMES DAILY
Status: DISCONTINUED | OUTPATIENT
Start: 2023-03-27 | End: 2023-03-28 | Stop reason: HOSPADM

## 2023-03-27 RX ORDER — ONDANSETRON 2 MG/ML
4 INJECTION INTRAMUSCULAR; INTRAVENOUS EVERY 6 HOURS PRN
Status: DISCONTINUED | OUTPATIENT
Start: 2023-03-27 | End: 2023-03-27

## 2023-03-27 RX ORDER — POTASSIUM CHLORIDE 750 MG/1
20 TABLET, FILM COATED, EXTENDED RELEASE ORAL 2 TIMES DAILY
Status: DISCONTINUED | OUTPATIENT
Start: 2023-03-27 | End: 2023-03-28 | Stop reason: HOSPADM

## 2023-03-27 RX ORDER — MAGNESIUM SULFATE IN WATER 40 MG/ML
2000 INJECTION, SOLUTION INTRAVENOUS PRN
Status: DISCONTINUED | OUTPATIENT
Start: 2023-03-27 | End: 2023-03-28 | Stop reason: HOSPADM

## 2023-03-27 RX ORDER — SODIUM CHLORIDE 0.9 % (FLUSH) 0.9 %
5-40 SYRINGE (ML) INJECTION PRN
Status: DISCONTINUED | OUTPATIENT
Start: 2023-03-27 | End: 2023-03-28 | Stop reason: HOSPADM

## 2023-03-27 RX ORDER — FUROSEMIDE 10 MG/ML
60 INJECTION INTRAMUSCULAR; INTRAVENOUS ONCE
Status: DISCONTINUED | OUTPATIENT
Start: 2023-03-27 | End: 2023-03-27 | Stop reason: CLARIF

## 2023-03-27 RX ORDER — POTASSIUM CHLORIDE 7.45 MG/ML
10 INJECTION INTRAVENOUS PRN
Status: DISCONTINUED | OUTPATIENT
Start: 2023-03-27 | End: 2023-03-28 | Stop reason: HOSPADM

## 2023-03-27 RX ORDER — SODIUM CHLORIDE 9 MG/ML
INJECTION, SOLUTION INTRAVENOUS PRN
Status: DISCONTINUED | OUTPATIENT
Start: 2023-03-27 | End: 2023-03-28 | Stop reason: HOSPADM

## 2023-03-27 RX ORDER — SPIRONOLACTONE 25 MG/1
25 TABLET ORAL DAILY
Status: DISCONTINUED | OUTPATIENT
Start: 2023-03-27 | End: 2023-03-28 | Stop reason: HOSPADM

## 2023-03-27 RX ORDER — POTASSIUM CHLORIDE 20 MEQ/1
40 TABLET, EXTENDED RELEASE ORAL PRN
Status: DISCONTINUED | OUTPATIENT
Start: 2023-03-27 | End: 2023-03-28 | Stop reason: HOSPADM

## 2023-03-27 RX ORDER — CLOPIDOGREL BISULFATE 75 MG/1
75 TABLET ORAL DAILY
Status: DISCONTINUED | OUTPATIENT
Start: 2023-03-27 | End: 2023-03-28 | Stop reason: HOSPADM

## 2023-03-27 RX ORDER — SODIUM CHLORIDE 0.9 % (FLUSH) 0.9 %
5-40 SYRINGE (ML) INJECTION EVERY 12 HOURS SCHEDULED
Status: DISCONTINUED | OUTPATIENT
Start: 2023-03-27 | End: 2023-03-28 | Stop reason: HOSPADM

## 2023-03-27 RX ORDER — ENOXAPARIN SODIUM 100 MG/ML
40 INJECTION SUBCUTANEOUS DAILY
Status: DISCONTINUED | OUTPATIENT
Start: 2023-03-27 | End: 2023-03-27

## 2023-03-27 RX ORDER — SODIUM CHLORIDE 0.9 % (FLUSH) 0.9 %
5-40 SYRINGE (ML) INJECTION PRN
Status: DISCONTINUED | OUTPATIENT
Start: 2023-03-27 | End: 2023-03-27 | Stop reason: CLARIF

## 2023-03-27 RX ORDER — SODIUM CHLORIDE 0.9 % (FLUSH) 0.9 %
5-40 SYRINGE (ML) INJECTION PRN
Start: 2023-03-27

## 2023-03-27 RX ORDER — FUROSEMIDE 10 MG/ML
40 INJECTION INTRAMUSCULAR; INTRAVENOUS 2 TIMES DAILY
Status: DISCONTINUED | OUTPATIENT
Start: 2023-03-27 | End: 2023-03-27

## 2023-03-27 RX ORDER — ROSUVASTATIN CALCIUM 20 MG/1
20 TABLET, COATED ORAL DAILY
Status: DISCONTINUED | OUTPATIENT
Start: 2023-03-27 | End: 2023-03-28 | Stop reason: HOSPADM

## 2023-03-27 RX ORDER — HYDRALAZINE HYDROCHLORIDE 50 MG/1
50 TABLET, FILM COATED ORAL EVERY 8 HOURS SCHEDULED
Status: DISCONTINUED | OUTPATIENT
Start: 2023-03-27 | End: 2023-03-28 | Stop reason: HOSPADM

## 2023-03-27 RX ORDER — ACETAMINOPHEN 325 MG/1
650 TABLET ORAL EVERY 6 HOURS PRN
Status: DISCONTINUED | OUTPATIENT
Start: 2023-03-27 | End: 2023-03-28 | Stop reason: HOSPADM

## 2023-03-27 RX ORDER — ISOSORBIDE MONONITRATE 60 MG/1
120 TABLET, EXTENDED RELEASE ORAL DAILY
Status: DISCONTINUED | OUTPATIENT
Start: 2023-03-27 | End: 2023-03-28 | Stop reason: HOSPADM

## 2023-03-27 RX ORDER — POLYETHYLENE GLYCOL 3350 17 G/17G
17 POWDER, FOR SOLUTION ORAL DAILY PRN
Status: DISCONTINUED | OUTPATIENT
Start: 2023-03-27 | End: 2023-03-28 | Stop reason: HOSPADM

## 2023-03-27 RX ORDER — ACETAMINOPHEN 650 MG/1
650 SUPPOSITORY RECTAL EVERY 6 HOURS PRN
Status: DISCONTINUED | OUTPATIENT
Start: 2023-03-27 | End: 2023-03-28 | Stop reason: HOSPADM

## 2023-03-27 RX ORDER — DILTIAZEM HYDROCHLORIDE 120 MG/1
120 CAPSULE, COATED, EXTENDED RELEASE ORAL 2 TIMES DAILY
Status: DISCONTINUED | OUTPATIENT
Start: 2023-03-27 | End: 2023-03-28 | Stop reason: HOSPADM

## 2023-03-27 RX ORDER — FUROSEMIDE 10 MG/ML
60 INJECTION INTRAMUSCULAR; INTRAVENOUS ONCE
Start: 2023-03-27 | End: 2023-03-27

## 2023-03-27 RX ORDER — METOPROLOL SUCCINATE 25 MG/1
25 TABLET, EXTENDED RELEASE ORAL DAILY
Status: DISCONTINUED | OUTPATIENT
Start: 2023-03-27 | End: 2023-03-28

## 2023-03-27 RX ADMIN — DOFETILIDE 250 MCG: 0.25 CAPSULE ORAL at 22:16

## 2023-03-27 RX ADMIN — TIOTROPIUM BROMIDE INHALATION SPRAY 2 PUFF: 3.12 SPRAY, METERED RESPIRATORY (INHALATION) at 08:43

## 2023-03-27 RX ADMIN — Medication 10 ML: at 08:00

## 2023-03-27 RX ADMIN — POTASSIUM CHLORIDE 40 MEQ: 1500 TABLET, EXTENDED RELEASE ORAL at 11:07

## 2023-03-27 RX ADMIN — FUROSEMIDE 10 MG/HR: 10 INJECTION, SOLUTION INTRAMUSCULAR; INTRAVENOUS at 11:14

## 2023-03-27 RX ADMIN — HYDRALAZINE HYDROCHLORIDE 50 MG: 50 TABLET, FILM COATED ORAL at 13:28

## 2023-03-27 RX ADMIN — ISOSORBIDE MONONITRATE 120 MG: 60 TABLET, EXTENDED RELEASE ORAL at 08:00

## 2023-03-27 RX ADMIN — DILTIAZEM HYDROCHLORIDE 120 MG: 120 CAPSULE, EXTENDED RELEASE ORAL at 02:13

## 2023-03-27 RX ADMIN — CLOPIDOGREL BISULFATE 75 MG: 75 TABLET ORAL at 22:15

## 2023-03-27 RX ADMIN — METOPROLOL SUCCINATE 25 MG: 25 TABLET, EXTENDED RELEASE ORAL at 11:07

## 2023-03-27 RX ADMIN — DOFETILIDE 250 MCG: 0.25 CAPSULE ORAL at 13:28

## 2023-03-27 RX ADMIN — HYDRALAZINE HYDROCHLORIDE 50 MG: 50 TABLET, FILM COATED ORAL at 22:15

## 2023-03-27 RX ADMIN — RIVAROXABAN 20 MG: 20 TABLET, FILM COATED ORAL at 08:00

## 2023-03-27 RX ADMIN — SPIRONOLACTONE 25 MG: 25 TABLET ORAL at 11:07

## 2023-03-27 RX ADMIN — MOMETASONE FUROATE AND FORMOTEROL FUMARATE DIHYDRATE 2 PUFF: 200; 5 AEROSOL RESPIRATORY (INHALATION) at 08:43

## 2023-03-27 RX ADMIN — ACETAMINOPHEN 650 MG: 325 TABLET ORAL at 22:15

## 2023-03-27 RX ADMIN — DILTIAZEM HYDROCHLORIDE 120 MG: 120 CAPSULE, EXTENDED RELEASE ORAL at 22:15

## 2023-03-27 RX ADMIN — FUROSEMIDE 40 MG: 10 INJECTION, SOLUTION INTRAMUSCULAR; INTRAVENOUS at 08:00

## 2023-03-27 RX ADMIN — POTASSIUM CHLORIDE 20 MEQ: 750 TABLET, FILM COATED, EXTENDED RELEASE ORAL at 11:07

## 2023-03-27 RX ADMIN — POTASSIUM CHLORIDE 20 MEQ: 750 TABLET, FILM COATED, EXTENDED RELEASE ORAL at 22:15

## 2023-03-27 RX ADMIN — HYDRALAZINE HYDROCHLORIDE 50 MG: 50 TABLET, FILM COATED ORAL at 04:07

## 2023-03-27 RX ADMIN — DILTIAZEM HYDROCHLORIDE 120 MG: 120 CAPSULE, EXTENDED RELEASE ORAL at 08:00

## 2023-03-27 RX ADMIN — MOMETASONE FUROATE AND FORMOTEROL FUMARATE DIHYDRATE 2 PUFF: 200; 5 AEROSOL RESPIRATORY (INHALATION) at 21:19

## 2023-03-27 RX ADMIN — Medication 10 ML: at 22:17

## 2023-03-27 RX ADMIN — ROSUVASTATIN CALCIUM 20 MG: 20 TABLET, FILM COATED ORAL at 08:00

## 2023-03-27 ASSESSMENT — PAIN SCALES - GENERAL
PAINLEVEL_OUTOF10: 2
PAINLEVEL_OUTOF10: 2

## 2023-03-27 ASSESSMENT — PAIN DESCRIPTION - PAIN TYPE: TYPE: ACUTE PAIN

## 2023-03-27 ASSESSMENT — PAIN DESCRIPTION - LOCATION
LOCATION: GENERALIZED
LOCATION: GENERALIZED

## 2023-03-27 ASSESSMENT — PAIN DESCRIPTION - DESCRIPTORS
DESCRIPTORS: ACHING
DESCRIPTORS: ACHING

## 2023-03-27 ASSESSMENT — PAIN - FUNCTIONAL ASSESSMENT: PAIN_FUNCTIONAL_ASSESSMENT: ACTIVITIES ARE NOT PREVENTED

## 2023-03-27 NOTE — H&P
Hospital Medicine History & Physical      PCP: Yaneli Moya MD    Date of Admission: 3/26/2023    Date of Service: Pt seen/examined on 3/26/2023 and admitted to inpatient    Chief Complaint: Progressive worsening shortness of breath over the last 4 to 5 days      History Of Present Illness: The patient is a 69 y.o. female with past medical history as below who presents to [facility] with progressive worsening shortness of breath even with her home oxygen on over last few days. She has had some cough with clear sputum but this has not been the main symptom for her concern. Her shortness of breath is both on exertion and at rest.  She apparently has increasing and worsening leg swelling though she has been compliant with her diuretic therapy. She denies any other recent symptoms of the last few days of fever, chills, dizziness, syncope, chest pain, dysuria, blood in urine/stool/sputum, nausea/vomiting/diarrhea/abdominal pain. Past Medical History:        Diagnosis Date    AAA (abdominal aortic aneurysm)     pt states it is 4cm    AAA (abdominal aortic aneurysm) without rupture 2/10/2015    Atrial fibrillation (HCC)     CAD (coronary artery disease)     CHF (congestive heart failure) (HCC)     COPD (chronic obstructive pulmonary disease) ([facility] [address]     History of blood clots     Hyperlipidemia     Hypertension        Past Surgical History:        Procedure Laterality Date    ABDOMINAL AORTIC ANEURYSM REPAIR      Endovascular abdominal AA    APPENDECTOMY  1990    incidental    BLADDER SUSPENSION      CARDIAC CATHETERIZATION Right 11/28/2022    Cardiomems PA sensor device implant Left PA    CARDIAC SURGERY      CATARACT REMOVAL      CHOLECYSTECTOMY  10/15/2013    COLONOSCOPY  09/02/2007    dr Sarabjit Jolley and check in 5 years.      COLONOSCOPY  06/16/2017    ok dr arndt, repeat

## 2023-03-27 NOTE — ED TRIAGE NOTES
Pt arrived to ED via EMS from home with c/o Sob on-going for past few days that worsened tonight. Pt states she feels like she has a lot of water on her body and c/o bilateral leg edema. Pt has hx of COPD and wears 4L NC at baseline. Pt given 2 breathing tx and PO solu-medrol by EMS. Pt states she uses walker at home.  Pt is alert and oriented upon arrival.

## 2023-03-27 NOTE — CARE COORDINATION
orders    Family can provide assistance at DC: Yes  Would you like Case Management to discuss the discharge plan with any other family members/significant others, and if so, who? Yes  Plans to Return to Present Housing: Yes  Other Identified Issues/Barriers to RETURNING to current housing: None  Potential Assistance needed at discharge: N/A            Potential DME:  None  Patient expects to discharge to: [address] [place] [address] for transportation at discharge: Family    Financial    Payor: Jagjitaubrey Montserrat / Plan: MEDICARE PART A AND B / Product Type: *No Product type* /     Does insurance require precert for SNF: No    Potential assistance Purchasing Medications: No  Meds-to-Beds request:        Mary Murrieta [phone] Renuka Wynne, [address] [facility] [phone] [facility] [phone]  [address] [place] [address]  Phone: [phone] Fax: [phone]      Notes:    Home Oxygen and Respiratory Equipment:   Has HOME OXYGEN prior to admission: Yes  Current Liter Flow/min: 5 lpm  Nori Mcknight [address]:  [employer]   Phone: [phone]    Factors facilitating achievement of predicted outcomes: Family support, Motivated, Cooperative, Pleasant, Sense of humor, Good insight into deficits, and Has needed Durable Medical Equipment at home    Barriers to discharge: Decreased endurance, increased SOB    Additional Case Management Notes:   DC plan to return home with .  can transport at dc. Patient has oxygen through [employer] at home at 5lpm.  Denied any other needs. The Plan for Transition of Care is related to the following treatment goals of COPD exacerbation ([address] [J44.1]  Acute on chronic respiratory failure (HCC) [J96.20]  Congestive heart failure, unspecified HF chronicity, unspecified heart failure type ([address] [I50.9]    The Patient and/or Patient Representative Agree with the Discharge Plan?  Yes    Tiera Coyle RN  Case Management Department  Ph: [phone]

## 2023-03-27 NOTE — ACP (ADVANCE CARE PLANNING)
Advance Care Planning     Advance Care Planning Activator (Inpatient)  Conversation Note      Date of ACP Conversation: 3/27/2023     Conversation Conducted with: Patient with Decision Making Capacity    ACP Activator: Rigo Watson RN    Health Care Decision Maker:     Current Designated Health Care Decision Maker:     Primary Decision Maker: Cary Barriga Spouse - [phone]    Secondary Decision Maker: Angelique Pitt - Child - [phone]    Today we documented Decision Maker(s) consistent with ACP documents on file. Care Preferences    Ventilation: \"If you were in your present state of health and suddenly became very ill and were unable to breathe on your own, what would your preference be about the use of a ventilator (breathing machine) if it were available to you? \"      Would the patient desire the use of ventilator (breathing machine)?: yes    \"If your health worsens and it becomes clear that your chance of recovery is unlikely, what would your preference be about the use of a ventilator (breathing machine) if it were available to you? \"     Would the patient desire the use of ventilator (breathing machine)?: No      Resuscitation  \"CPR works best to restart the heart when there is a sudden event, like a heart attack, in someone who is otherwise healthy. Unfortunately, CPR does not typically restart the heart for people who have serious health conditions or who are very sick. \"    \"In the event your heart stopped as a result of an underlying serious health condition, would you want attempts to be made to restart your heart (answer \"yes\" for attempt to resuscitate) or would you prefer a natural death (answer \"no\" for do not attempt to resuscitate)? \" yes       [] Yes   [] No   Educated Patient / Olman Alaniz regarding differences between Advance Directives and portable DNR orders.     Length of ACP Conversation in minutes:      Conversation Outcomes:  ACP discussion completed    Follow-up plan:    []

## 2023-03-27 NOTE — ED PROVIDER NOTES
recognition program.  Efforts were made to edit the dictations but occasionally words are mis-transcribed.)    Thony Sanchez MD (electronically signed)  Attending Emergency Physician          Thony Sanchez MD  03/26/23 8452

## 2023-03-27 NOTE — ED NOTES
Report given to 5W RN Estefany to assume care. All questions answered, RN verbalized understanding, SBAR discussed.       Marci Ricks, RN  03/27/23 3849

## 2023-03-28 ENCOUNTER — TELEPHONE (OUTPATIENT)
Dept: CARDIOLOGY CLINIC | Age: 78
End: 2023-03-28

## 2023-03-28 VITALS
SYSTOLIC BLOOD PRESSURE: 118 MMHG | HEART RATE: 53 BPM | HEIGHT: 64 IN | OXYGEN SATURATION: 100 % | RESPIRATION RATE: 21 BRPM | BODY MASS INDEX: 40.2 KG/M2 | DIASTOLIC BLOOD PRESSURE: 46 MMHG | TEMPERATURE: 97.8 F | WEIGHT: 235.45 LBS

## 2023-03-28 LAB
ALBUMIN SERPL-MCNC: 3.4 G/DL (ref 3.4–5)
ANION GAP SERPL CALCULATED.3IONS-SCNC: 15 MMOL/L (ref 3–16)
BASOPHILS # BLD: 0 K/UL (ref 0–0.2)
BASOPHILS NFR BLD: 0.1 %
BUN SERPL-MCNC: 48 MG/DL (ref 7–20)
CALCIUM SERPL-MCNC: 9 MG/DL (ref 8.3–10.6)
CHLORIDE SERPL-SCNC: 88 MMOL/L (ref 99–110)
CO2 SERPL-SCNC: 35 MMOL/L (ref 21–32)
CREAT SERPL-MCNC: 2.1 MG/DL (ref 0.6–1.2)
DEPRECATED RDW RBC AUTO: 19.1 % (ref 12.4–15.4)
EOSINOPHIL # BLD: 0 K/UL (ref 0–0.6)
EOSINOPHIL NFR BLD: 0 %
GFR SERPLBLD CREATININE-BSD FMLA CKD-EPI: 24 ML/MIN/{1.73_M2}
GLUCOSE SERPL-MCNC: 131 MG/DL (ref 70–99)
HCT VFR BLD AUTO: 26.3 % (ref 36–48)
HGB BLD-MCNC: 8.2 G/DL (ref 12–16)
LYMPHOCYTES # BLD: 0.8 K/UL (ref 1–5.1)
LYMPHOCYTES NFR BLD: 5.1 %
MAGNESIUM SERPL-MCNC: 2.3 MG/DL (ref 1.8–2.4)
MCH RBC QN AUTO: 25.8 PG (ref 26–34)
MCHC RBC AUTO-ENTMCNC: 31.1 G/DL (ref 31–36)
MCV RBC AUTO: 83 FL (ref 80–100)
MONOCYTES # BLD: 1.1 K/UL (ref 0–1.3)
MONOCYTES NFR BLD: 6.9 %
NEUTROPHILS # BLD: 14.2 K/UL (ref 1.7–7.7)
NEUTROPHILS NFR BLD: 87.9 %
PHOSPHATE SERPL-MCNC: 4.3 MG/DL (ref 2.5–4.9)
PLATELET # BLD AUTO: 265 K/UL (ref 135–450)
PMV BLD AUTO: 9.8 FL (ref 5–10.5)
POTASSIUM SERPL-SCNC: 3.9 MMOL/L (ref 3.5–5.1)
RBC # BLD AUTO: 3.17 M/UL (ref 4–5.2)
SODIUM SERPL-SCNC: 138 MMOL/L (ref 136–145)
WBC # BLD AUTO: 16.1 K/UL (ref 4–11)

## 2023-03-28 PROCEDURE — 85025 COMPLETE CBC W/AUTO DIFF WBC: CPT

## 2023-03-28 PROCEDURE — 99233 SBSQ HOSP IP/OBS HIGH 50: CPT | Performed by: INTERNAL MEDICINE

## 2023-03-28 PROCEDURE — 6360000002 HC RX W HCPCS: Performed by: INTERNAL MEDICINE

## 2023-03-28 PROCEDURE — 2700000000 HC OXYGEN THERAPY PER DAY

## 2023-03-28 PROCEDURE — 6370000000 HC RX 637 (ALT 250 FOR IP): Performed by: INTERNAL MEDICINE

## 2023-03-28 PROCEDURE — 80069 RENAL FUNCTION PANEL: CPT

## 2023-03-28 PROCEDURE — 94640 AIRWAY INHALATION TREATMENT: CPT

## 2023-03-28 PROCEDURE — 83735 ASSAY OF MAGNESIUM: CPT

## 2023-03-28 PROCEDURE — 6370000000 HC RX 637 (ALT 250 FOR IP): Performed by: NURSE PRACTITIONER

## 2023-03-28 PROCEDURE — 36415 COLL VENOUS BLD VENIPUNCTURE: CPT

## 2023-03-28 PROCEDURE — 2580000003 HC RX 258: Performed by: INTERNAL MEDICINE

## 2023-03-28 PROCEDURE — 6370000000 HC RX 637 (ALT 250 FOR IP): Performed by: STUDENT IN AN ORGANIZED HEALTH CARE EDUCATION/TRAINING PROGRAM

## 2023-03-28 PROCEDURE — 94761 N-INVAS EAR/PLS OXIMETRY MLT: CPT

## 2023-03-28 RX ORDER — METOPROLOL SUCCINATE 25 MG/1
25 TABLET, EXTENDED RELEASE ORAL DAILY
Status: DISCONTINUED | OUTPATIENT
Start: 2023-03-28 | End: 2023-03-28 | Stop reason: HOSPADM

## 2023-03-28 RX ORDER — ISOSORBIDE MONONITRATE 120 MG/1
120 TABLET, EXTENDED RELEASE ORAL DAILY
Qty: 30 TABLET | Refills: 3 | Status: SHIPPED | OUTPATIENT
Start: 2023-03-29

## 2023-03-28 RX ORDER — SPIRONOLACTONE 25 MG/1
25 TABLET ORAL DAILY
Qty: 30 TABLET | Refills: 3 | Status: SHIPPED | OUTPATIENT
Start: 2023-03-29

## 2023-03-28 RX ORDER — METOPROLOL SUCCINATE 25 MG/1
TABLET, EXTENDED RELEASE ORAL
Qty: 60 TABLET | Refills: 2 | Status: SHIPPED | OUTPATIENT
Start: 2023-03-28

## 2023-03-28 RX ORDER — TORSEMIDE 20 MG/1
40 TABLET ORAL DAILY
Status: DISCONTINUED | OUTPATIENT
Start: 2023-03-28 | End: 2023-03-28 | Stop reason: HOSPADM

## 2023-03-28 RX ORDER — HYDROXYZINE HYDROCHLORIDE 10 MG/1
10 TABLET, FILM COATED ORAL 3 TIMES DAILY PRN
Status: DISCONTINUED | OUTPATIENT
Start: 2023-03-28 | End: 2023-03-28 | Stop reason: HOSPADM

## 2023-03-28 RX ORDER — METOPROLOL SUCCINATE 25 MG/1
12.5 TABLET, EXTENDED RELEASE ORAL EVERY EVENING
Status: DISCONTINUED | OUTPATIENT
Start: 2023-03-28 | End: 2023-03-28 | Stop reason: HOSPADM

## 2023-03-28 RX ADMIN — DOFETILIDE 250 MCG: 0.25 CAPSULE ORAL at 08:58

## 2023-03-28 RX ADMIN — IRON SUCROSE 100 MG: 20 INJECTION, SOLUTION INTRAVENOUS at 10:49

## 2023-03-28 RX ADMIN — DILTIAZEM HYDROCHLORIDE 120 MG: 120 CAPSULE, EXTENDED RELEASE ORAL at 08:59

## 2023-03-28 RX ADMIN — HYDROXYZINE HYDROCHLORIDE 10 MG: 10 TABLET, FILM COATED ORAL at 01:16

## 2023-03-28 RX ADMIN — MOMETASONE FUROATE AND FORMOTEROL FUMARATE DIHYDRATE 2 PUFF: 200; 5 AEROSOL RESPIRATORY (INHALATION) at 08:59

## 2023-03-28 RX ADMIN — SPIRONOLACTONE 25 MG: 25 TABLET ORAL at 08:58

## 2023-03-28 RX ADMIN — POTASSIUM CHLORIDE 20 MEQ: 750 TABLET, FILM COATED, EXTENDED RELEASE ORAL at 08:58

## 2023-03-28 RX ADMIN — METOPROLOL SUCCINATE 25 MG: 25 TABLET, EXTENDED RELEASE ORAL at 08:59

## 2023-03-28 RX ADMIN — RIVAROXABAN 20 MG: 20 TABLET, FILM COATED ORAL at 08:58

## 2023-03-28 RX ADMIN — TORSEMIDE 40 MG: 20 TABLET ORAL at 10:49

## 2023-03-28 RX ADMIN — ISOSORBIDE MONONITRATE 120 MG: 60 TABLET, EXTENDED RELEASE ORAL at 08:58

## 2023-03-28 RX ADMIN — ROSUVASTATIN CALCIUM 20 MG: 20 TABLET, FILM COATED ORAL at 08:58

## 2023-03-28 RX ADMIN — TIOTROPIUM BROMIDE INHALATION SPRAY 2 PUFF: 3.12 SPRAY, METERED RESPIRATORY (INHALATION) at 08:59

## 2023-03-28 RX ADMIN — HYDRALAZINE HYDROCHLORIDE 50 MG: 50 TABLET, FILM COATED ORAL at 05:22

## 2023-03-28 RX ADMIN — FUROSEMIDE 10 MG/HR: 10 INJECTION, SOLUTION INTRAMUSCULAR; INTRAVENOUS at 00:24

## 2023-03-28 NOTE — TELEPHONE ENCOUNTER
Patient discharged this afternoon prior to rescheduling follow up appointment. Please call patient to make her aware of appointment this Friday with Dr. Osito Murrieta in [place].

## 2023-03-28 NOTE — PROGRESS NOTES
4 Eyes Skin Assessment     NAME:  Armando Hernandes  YOB: 1945  MEDICAL RECORD NUMBER:  [id number]    The patient is being assessed for  Admission    I agree that One RN has performed a thorough Head to Toe Skin Assessment on the patient. ALL assessment sites listed below have been assessed. Areas assessed by both nurses:    Head, Face, Ears, Shoulders, Back, Chest, Arms, Elbows, Hands, Sacrum. Buttock, Coccyx, Ischium, and Legs. Feet and Heels        Does the Patient have a Wound?  No noted wound(s)       Braydon Prevention initiated by RN: No   Wound Care Orders initiated by RN: No    Pressure Injury (Stage 3,4, Unstageable, DTI, NWPT, and Complex wounds) if present, place referral order by RN under : No    New and Established Ostomies, if present place, referral order under : No      Nurse 1 eSignature: Electronically signed by Charlie Albrecht RN on 3/27/23 at 2:04 AM EDT    **SHARE this note so that the co-signing nurse can place an eSignature**    Nurse 2 eSignature: Electronically signed by Jerry Bowles RN on 3/27/23 at 4:19 AM EDT
Insists on going home.   Does not want [facility]  Aware of risk of readmit  On a lot of meds but still does not want [facility]    Dc orders placed  TOMEKA done  Placed order for continued home oxgyen    Electronically signed by Jessica Duarte MD on 3/28/2023 at 1:57 PM
Judy   Daily Progress Note      Admit Date:  3/26/2023    CC: \" I feel better this morning  Renny Yo is a 69 y.o. patient very well-known to me, with history of CAD , hypertension, recurrent diastolic heart failure s/p CardioMEMS COPD paroxysmal atrial fibrillation s/p who presented to the hospital with complaints of increasing shortness of breath and lower extremity edema. She is experiencing the symptoms for last few weeks and being managed in outpatient setting with adjustment in her diuretic regiment. Her symptoms have slightly improved when I saw her on 3/24/2023. Over the weekend her symptoms shortness of breath and lower extremity edema got worse and she decided to come to to the emergency room. In emergency room she was diagnosed with congestive heart failure with elevated BNP. Her chest x-ray though showed no evidence of pulmonary congestion  I have been asked to provide consultation regarding further management and testing. Interval history 3/28/2023  Patient complains of less shortness of breath and lower extremity edema. She is -1 L in last 24 hours. Blood pressure remains elevated. No arrhythmia on the monitor. Appreciate evaluation by pulmonology    Objective:   BP (!) 168/65   Pulse 63   Temp 97.8 °F (36.6 °C)   Resp 18   Ht 5' 4\" (1.626 m)   Wt 235 lb 7.2 oz (106.8 kg)   SpO2 (!) 75%   BMI 40.42 kg/m²     Intake/Output Summary (Last 24 hours) at 3/28/2023 0853  Last data filed at 3/28/2023 9539  Gross per 24 hour   Intake 954.5 ml   Output 2000 ml   Net -1045.5 ml     Wt Readings from Last 3 Encounters:   03/28/23 235 lb 7.2 oz (106.8 kg)   03/24/23 233 lb (105.7 kg)   03/10/23 227 lb (103 kg)     Telemetry: NSR    Physical Exam:  General:  NAD, Awake, alert and oriented X4  Skin:  Warm and dry  Neck:  Supple, no JVP appreciated, no bruit  Chest:  Clear to auscultation, no wheezes/rhonchi/rales  Cardiovascular:  Regular rate.  S1S2  Abdomen:  Soft,
Order received for atarax for itching. Atarax Po given for itch per MD order. Call light in reach. Bed alarm on.
Patient on lasix drip. Pure wick in place. Educated regarding utilizing call light for needs and fall precautions.
Patient was evaluated today for the diagnosis of COPD. I entered a DME order for home oxygen because the diagnosis and testing requires the patient to have supplemental oxygen. Condition will improve or be benefited by oxygen use. The patient is  able to perform good mobility in a home setting and therefore does require the use of a portable oxygen system. The need for this equipment was discussed with the patient and she understands and is in agreement.  Electronically signed by Kofi Bruner MD on 3/28/2023 at 1:58 PM
Patient's 0400 BP was 192/74. RN gave 0600 scheduled Hydralazine. See MAR. Patient asymptomatic. Will continue to monitor.  Electronically signed by Gregoria Baumgarten, RN on 3/27/2023 at 4:37 AM
Pharmacy Medication Reconciliation Note     List of medications Roya Do is currently taking is complete. Source of information:   1. Conversation with spouse at bedside  2. EMR    Notes regarding home medications:   1. Patient has received morning medications today PTA in the ED  2.  Patient is anticoagulated with Xarelto 20 mg daily-- received dose this morning    Patient denies taking any OTC or herbal medications other than those listed    Karen Yadav, Pharmacy Intern  3/26/2023  10:00 PM
Pt arrived via stretcher from ED to room 6665. Heart monitor connected and verified with [facility]. Nasal cannula humidified on 4 liters. VS, assessment, and admission complete. 4 eyes assessment complete. Pt oriented to unit and room. Call light and bedside table in reach. Spouse at bedside All questions answered. Pt resting quietly in bed with no complaints or voiced needs at this time. Will continue to monitor.   Electronically signed by Jacky Walker RN on 3/27/2023 at 2:02 AM
Pt complaining of itching. Secure message sent to hospitalists regarding pt complaint and request for benadryl. Await orders. Call light in reach.
Pulmonary Progress Note    Date of Admission: 3/26/2023   LOS: 2 days       CC:  Chief Complaint   Patient presents with    Shortness of Breath     States began a few days ago and worsened tonight; hx of COPD and wears 4L NC baseline; EMS gave pt 2 breathing tx and PO solu-medrol; pt states she has been having bilateral leg edema; states she is \"full of water         Subjective:  Feeling better     ROS:   No nausea  No Vomiting  No chest pain       Assessment:     DENISE? Plan: This note may have been transcribed using [id number]. Please disregard any translational errors. Hospital Day: 2     Chronic hypercapnic respiratory failure  See below. Likely has sleep apnea     COPD  Trelegy at home. Dulera         Hx CHF  Cardiology following   She complains of increased lower extremity edema recently   No significant pulmonary edema        Obesity   Body mass index is 40.3 kg/m². Discuss contribution        DENISE? Discussed BiPAP further. She believes that she had a sleep study number of years ago and was not able to use the machine for more than 1 week and return. Therefore, she is not interested in using a BiPAP at this time. Chronic hypoxemia  Weaned to 2 L nasal cannula. Better than baseline of 4 L nasal cannula      From pulmonary standpoint, okay to discharge today. Defer to cardiology for her dry weight. We discussed that her dyspnea is multifactorial including COPD, sleep apnea, obesity, CHF, age and deconditioning. Data:        PHYSICAL EXAM:   Blood pressure (!) 118/46, pulse 53, temperature 97.8 °F (36.6 °C), temperature source Axillary, resp. rate 21, height 5' 4\" (1.626 m), weight 235 lb 7.2 oz (106.8 kg), SpO2 100 %, not currently breastfeeding.'  Body mass index is 40.42 kg/m². Gen: No distress. ENT:   Resp: No accessory muscle use. No crackles. No wheezes. No rhonchi. CV: Regular rate. Regular rhythm. No murmur or rub. No edema.    Skin: Warm,
[J96.20]     Persistent atrial fibrillation (HCC) [I48.19]     Acute on chronic diastolic congestive heart failure (HCC) [I50.33]        Acute hypoxic respiratory failure  Multifactorial due to underlying hypercapnia and CHF. Continue IV diuresis. BiPAP as needed  On Lasix drip  Cardiology pulmonary consult appreciated      DVT Prophylaxis:   Diet: ADULT DIET; Regular; 4 carb choices (60 gm/meal);  Low Sodium (2 gm); 1500 ml  Code Status: Full Code    Dispo - 2 d        Callum Del Cid MD

## 2023-03-28 NOTE — NURSE NAVIGATOR
Discharge order noted. Pt demanded to be discharged today. Pt will only see Dr. Jenifer Duncan at the [facility], and has an appointment in place 4/7/23@ 4pm. Attempted to reschedule for a time within the 7 day window, but there were no appointments available at that location, and pt/ refuse to be seen anywhere but at the [facility]. Instructed to please obtain cardiomem readings at home as directed, and call the office if she starts with worsening symptoms.

## 2023-03-28 NOTE — CONSULTS
Consult received. Pt seen on day of discharge. Pt insisting she be discharged today, or she will leave anyway. Pt well known to heart failure team.  She was dressed and and ready to go, irritated that her wheelchair and paperwork weren't ready. She said she felt better, and was anxious to go. I obtained her Cardiomems readings, and spend time talking to her about her acute on chronic heart failure. I mentioned that she had not kept a number of her appointments, and that Dr. Jenifer Duncan had offered to arrange for outpatient IV lasix for her since she hates being admitted to the hospital, but she felt so bad over the weekend she came in to the hospital for admission. She said every time she has an appointment it rains. Again, I emphasized the need for keeping her appointments so she can avoid being admitted. She said her  has taken over the cooking, and is doing a good job of cooking healthy dishes. I did mention to her, that since she is so adamant about staying home, that maybe it is time for her to consider advanced heart failure palliative care. She balked at the word Hospice, so I did not spend too much time, I just explained that if she has reached the point of just wanting to stay out of the hospital, and wants lasix /meds for symptom management to spend her days out of the hospital, that is a service she can look into. It's about living your remaining days--not so much about dying. She said that lasix is killing her kidneys . I explained that it is important to catch her heart failure early--so she only needs a small amount of lasix to get out of the hole--if she waits too long, it is hard on her body, it is hard on her heart and very hard on her kidneys. She declined a formal referral to the [facility]  She plans on keeping the appointment she has in place. She will not go to another location to be seen sooner.
Heart Failure Diet Education:    Per hospital protocol or consult, patient being seen for Heart Failure diet guidelines. Learners: [x]Patient []Family []Caregiver [] Other: ______________  Methods: [x]Verbal Education  [x]Handouts  []Teachback     Patient's Lifestyle Questions:   Is HF a new Diagnosis? []Yes [x]No    Has patient had prior education? [x]Yes []No    Who does Grocery shopping and cooking? Pt's     Frequency of eating out? Rarely    Typical Eating Habits:Pt's husbands shops and cooks. She says that he tries to choose low sodium options when possible. She doesn't use table salt. Instructed the Patient on:   [x] High/Low Sodium foods    [x] Moderation/portion control  [x] Eating out  [x] Fluid Restriction    [x] Label reading  [] Carb Counting   [] Other:    Educational Materials Provided:   [x] Nutrition Care Manual (NCM) Heart Failure Nutrition Therapy   [] NCM Sodium (Salt) Content of Foods  [] NCM Sodium Free Flavoring Tips    [] Heart Healthy Eating Label Reading Tips   [] Healthy Eating when Eating Out  [] Controlling Your Fluids   [] [facility] (from [employer])  [] NCM Carbohydrate Counting for People with Diabetes   [] Managing Your Diabetes Plate Method     -Diet Recommendations: 2000 mg Sodium/day, 64 oz Fluids/day     Monitoring/Evaluation:   -Barriers: None  -Evaluation of education: Indicates understanding.  -Expected compliance: good. Additional Comments: Pt's  was not in the room during visit. Asked patient to share the packet with her  since he does the cooking and shopping.     Total time involved in patient education: 5 minutes      Electronically signed by Alaina Hodgkin on 3/27/2023 at 1:13 PM
aortic aneurysm) without rupture 2/10/2015    Atrial fibrillation (HCC)     CAD (coronary artery disease)     CHF (congestive heart failure) (HCC)     COPD (chronic obstructive pulmonary disease) ([facility] [address]     History of blood clots     Hyperlipidemia     Hypertension        PAST SURGICAL HISTORY:  Past Surgical History:   Procedure Laterality Date    ABDOMINAL AORTIC ANEURYSM REPAIR      Endovascular abdominal AA    APPENDECTOMY  1990    incidental    BLADDER SUSPENSION      CARDIAC CATHETERIZATION Right 11/28/2022    Cardiomems PA sensor device implant Left PA    CARDIAC SURGERY      CATARACT REMOVAL      CHOLECYSTECTOMY  10/15/2013    COLONOSCOPY  09/02/2007    dr Donovan Frankel and check in 5 years. COLONOSCOPY  06/16/2017    ok dr arndt, repeat 5 years    HYSTERECTOMY ([facility])  1990    for benign tumor. just the uterus    JOINT REPLACEMENT  12/2013    right knee replacement    TONSILLECTOMY  as a child    TUMOR EXCISION      benign behind right ear about 2008       FAMILY HISTORY:  family history includes Heart Disease in her father; Hypertension in an other family member. SOCIAL HISTORY:   reports that she quit smoking about 9 years ago. Her smoking use included cigarettes. She started smoking about 46 years ago. She has a 37.00 pack-year smoking history. She has been exposed to tobacco smoke.  She has quit using smokeless tobacco.    Scheduled Meds:   clopidogrel  75 mg Oral Daily    dilTIAZem  120 mg Oral BID    sodium chloride flush  5-40 mL IntraVENous 2 times per day    isosorbide mononitrate  120 mg Oral Daily    hydrALAZINE  50 mg Oral 3 times per day    rivaroxaban  20 mg Oral Daily with breakfast    rosuvastatin  20 mg Oral Daily    mometasone-formoterol  2 puff Inhalation BID    tiotropium  2 puff Inhalation Daily    spironolactone  25 mg Oral Daily    potassium chloride  20 mEq Oral BID    dofetilide  250 mcg Oral BID    metoprolol succinate  25 mg Oral Daily       Continuous Infusions:
Vitals:    03/27/23 0846   BP:    Pulse: 91   Resp: 23   Temp:    SpO2: 97%    Weight: 234 lb 12.6 oz (106.5 kg)       Blood pressure 162/46  General Appearance:  Alert, cooperative, mild respiratory distress, appears stated age. Head:  Normocephalic, without obvious abnormality, atraumatic. Eyes:  Pupils equal and round. No scleral icterus. Slightly pale conjunctiva   Mouth: Moist mucosa, no pharyngeal erythema. Nose: Nares normal. No drainage or sinus tenderness. Neck: Supple, symmetrical, trachea midline. No adenopathy. No tenderness/mass/nodules. No carotid bruit could not appreciate jugular venous pulse       Lungs:   Diminished breath sounds i. Chest Wall:  No tenderness or deformity. Heart:  Regular rate, S1/ S2 normal. No murmur, rub, or gallop. Abdomen:   Soft, non-tender, bowel sounds active. Musculoskeletal: No muscle wasting or digital clubbing. Extremities: Extremities normal, atraumatic. No cyanosis 2+ bilateral edema. Pulses: 2+ radial and pedal pulses, symmetric. Skin: No rashes or lesions. Pysch: Normal mood and affect. Alert and oriented x 4.    Neurologic: Normal gross motor and sensory exam.         Labs     CBC:   Lab Results   Component Value Date/Time    WBC 9.0 03/27/2023 06:49 AM    RBC 3.16 03/27/2023 06:49 AM    RBC 4.19 04/13/2017 12:56 PM    HGB 8.5 03/27/2023 06:49 AM    HCT 26.5 03/27/2023 06:49 AM    MCV 83.7 03/27/2023 06:49 AM    RDW 18.8 03/27/2023 06:49 AM     03/27/2023 06:49 AM     CMP:  Lab Results   Component Value Date/Time     03/27/2023 06:49 AM    K 3.2 03/27/2023 06:49 AM    K 3.4 03/26/2023 08:41 PM    CL 90 03/27/2023 06:49 AM    CO2 38 03/27/2023 06:49 AM    BUN 39 03/27/2023 06:49 AM    CREATININE 1.5 03/27/2023 06:49 AM    GFRAA 53 10/13/2022 12:12 PM    GFRAA >60 06/25/2012 11:06 AM    AGRATIO 1.6 11/02/2022 07:29 PM    LABGLOM 36 03/27/2023 06:49 AM    GLUCOSE 159 03/27/2023 06:49 AM    GLUCOSE 102 07/14/2016 01:03 PM

## 2023-03-28 NOTE — PLAN OF CARE
Problem: Discharge Planning  Goal: Discharge to home or other facility with appropriate resources  Outcome: Progressing  Flowsheets (Taken 3/27/2023 2218)  Discharge to home or other facility with appropriate resources: Identify barriers to discharge with patient and caregiver     Problem: Safety - Adult  Goal: Free from fall injury  Outcome: Progressing     Problem: ABCDS Injury Assessment  Goal: Absence of physical injury  Outcome: Progressing     Problem: Respiratory - Adult  Goal: Achieves optimal ventilation and oxygenation  Outcome: Progressing     Problem: Cardiovascular - Adult  Goal: Maintains optimal cardiac output and hemodynamic stability  Outcome: Progressing  Goal: Absence of cardiac dysrhythmias or at baseline  Outcome: Progressing     Problem: Metabolic/Fluid and Electrolytes - Adult  Goal: Electrolytes maintained within normal limits  Outcome: Progressing  Goal: Hemodynamic stability and optimal renal function maintained  Outcome: Progressing     Problem: Pain  Goal: Verbalizes/displays adequate comfort level or baseline comfort level  Outcome: Progressing

## 2023-03-28 NOTE — PLAN OF CARE
Problem: Discharge Planning  Goal: Discharge to home or other facility with appropriate resources  Outcome: Completed     Problem: Safety - Adult  Goal: Free from fall injury  3/28/2023 1547 by Cuca Valdez RN  Outcome: Completed  3/28/2023 1546 by Cuca Valdez RN  Outcome: Progressing     Problem: ABCDS Injury Assessment  Goal: Absence of physical injury  Outcome: Completed     Problem: Respiratory - Adult  Goal: Achieves optimal ventilation and oxygenation  Outcome: Completed     Problem: Cardiovascular - Adult  Goal: Maintains optimal cardiac output and hemodynamic stability  Outcome: Completed  Goal: Absence of cardiac dysrhythmias or at baseline  Outcome: Completed     Problem: Metabolic/Fluid and Electrolytes - Adult  Goal: Electrolytes maintained within normal limits  Outcome: Completed  Goal: Hemodynamic stability and optimal renal function maintained  Outcome: Completed     Problem: Pain  Goal: Verbalizes/displays adequate comfort level or baseline comfort level  Outcome: Completed

## 2023-03-28 NOTE — DISCHARGE INSTR - COC
I48.91    LI (dyspnea on exertion) R06.09    Chronic systolic (congestive) heart failure I50.22    Pneumonia J18.9    Acute on chronic respiratory failure with hypoxia ([facility]) J96.21    Drug-induced insomnia ([facility]) F19.982    PSVT (paroxysmal supraventricular tachycardia) ([facility]) I47.1    Acute on chronic diastolic heart failure ([facility]) I50.33    Coronary artery disease involving native coronary artery of native heart without angina pectoris I25.10    Acute on chronic respiratory failure ([facility]) J96.20    Congestive heart failure ([facility]) I50.9    Hypokalemia E87.6    Chronic hypercapnic respiratory failure ([facility]) J96.12       Isolation/Infection:   Isolation            Contact          Patient Infection Status       Infection Onset Added Last Indicated Last Indicated By Review Planned Expiration Resolved Resolved By    [facility] 10/30/18 03/27/23 27/52/24 Cam Rain        Added from external infection.     Resolved    COVID-19 (Rule Out) 22 COVID-19, Rapid (Ordered)   22 Rule-Out Test Resulted    COVID-19 (Rule Out) 22 COVID-19, Rapid (Ordered)   22 Rule-Out Test Resulted    COVID-19 (Rule Out) 20 COVID-19 (Ordered)   20 Maritza López RN            Nurse Assessment:  Last Vital Signs: BP (!) 118/46   Pulse 53   Temp 97.8 °F (36.6 °C) (Axillary)   Resp 21   Ht 5' 4\" (1.626 m)   Wt 235 lb 7.2 oz (106.8 kg)   SpO2 100%   BMI 40.42 kg/m²     Last documented pain score (0-10 scale): Pain Level: 2  Last Weight:   Wt Readings from Last 1 Encounters:   23 235 lb 7.2 oz (106.8 kg)     Mental Status:  {IP PT MENTAL STATUS:}    IV Access:  {[facility] ACCESS:710272947}    Nursing Mobility/ADLs:  Walking   {[facility] DME DTQJ:290789749}  Transfer  {[facility] DME DYXF:593816576}  Bathing  {[facility] DME RTQ}  Dressing  {[facility] D[facility] LCKE:407847371}  Toileting  {[facility] D[facility] IOHO:885564383}  Feeding  {[facility] DME ADVP:421046541}  Med Admin  {[facility] DME

## 2023-03-28 NOTE — NURSE NAVIGATOR
Cardiomems readings obtained. Pt was sitting in the recliner at the time of the readings. She was dressed and anxious to leave, and would not get back in the bed. PA goal   20   PA 3/29   29  PA today  23-24. Several readings obtained. Waveforms not ideal  Pt instructed to obtain her reading tomorrow as usual.   Kimberley Hodges/ Dr. Grover Core updated.

## 2023-03-29 ENCOUNTER — CLINICAL DOCUMENTATION ONLY (OUTPATIENT)
Facility: CLINIC | Age: 78
End: 2023-03-29

## 2023-03-29 ENCOUNTER — FOLLOWUP TELEPHONE ENCOUNTER (OUTPATIENT)
Dept: INPATIENT UNIT | Age: 78
End: 2023-03-29

## 2023-03-29 NOTE — PROGRESS NOTES
Spoke with Delmy Lehman this afternoon for a follow up call. I left her know that I spoke with Dr. Verena Penny after she left and he was not happy that she was discharged, he wants to see her on  this Friday. The office was able to get her an appointment on Friday 3/31@ 230pm, at the [facility]. She appreciated the appointment. Her  was having trouble with his back--I stressed the importance of her keeping that appointment, so she knows to call for a Lyft, should she not be able to arrange for a ride. She was feeling better, she has all of her  meds.    Did her cardiomem reading this am.   She is doing her best with her diet/ fluid restriction

## 2023-03-31 ENCOUNTER — OFFICE VISIT (OUTPATIENT)
Dept: CARDIOLOGY CLINIC | Age: 78
End: 2023-03-31

## 2023-03-31 ENCOUNTER — TELEPHONE (OUTPATIENT)
Dept: FAMILY MEDICINE CLINIC | Age: 78
End: 2023-03-31

## 2023-03-31 VITALS
HEIGHT: 64 IN | WEIGHT: 234 LBS | DIASTOLIC BLOOD PRESSURE: 64 MMHG | HEART RATE: 60 BPM | OXYGEN SATURATION: 99 % | BODY MASS INDEX: 39.95 KG/M2 | SYSTOLIC BLOOD PRESSURE: 124 MMHG

## 2023-03-31 DIAGNOSIS — I10 ESSENTIAL HYPERTENSION: ICD-10-CM

## 2023-03-31 DIAGNOSIS — R06.09 DYSPNEA ON EXERTION: ICD-10-CM

## 2023-03-31 DIAGNOSIS — I50.33 ACUTE ON CHRONIC DIASTOLIC CONGESTIVE HEART FAILURE (HCC): Primary | ICD-10-CM

## 2023-03-31 DIAGNOSIS — I25.10 CORONARY ARTERY DISEASE INVOLVING NATIVE CORONARY ARTERY OF NATIVE HEART WITHOUT ANGINA PECTORIS: ICD-10-CM

## 2023-03-31 DIAGNOSIS — N28.9 RENAL INSUFFICIENCY: Primary | ICD-10-CM

## 2023-03-31 DIAGNOSIS — D64.9 CHRONIC ANEMIA: ICD-10-CM

## 2023-03-31 DIAGNOSIS — G47.33 SLEEP APNEA, OBSTRUCTIVE: ICD-10-CM

## 2023-03-31 DIAGNOSIS — J44.9 COPD, SEVERE (HCC): ICD-10-CM

## 2023-03-31 DIAGNOSIS — N18.9 CHRONIC KIDNEY DISEASE, UNSPECIFIED CKD STAGE: ICD-10-CM

## 2023-03-31 DIAGNOSIS — R60.9 SWELLING: ICD-10-CM

## 2023-03-31 DIAGNOSIS — R01.1 SYSTOLIC MURMUR: ICD-10-CM

## 2023-03-31 DIAGNOSIS — J96.10 CHRONIC RESPIRATORY FAILURE, UNSPECIFIED WHETHER WITH HYPOXIA OR HYPERCAPNIA (HCC): ICD-10-CM

## 2023-03-31 RX ORDER — METOLAZONE 5 MG/1
TABLET ORAL
Qty: 15 TABLET | Refills: 4 | Status: SHIPPED | OUTPATIENT
Start: 2023-03-31

## 2023-03-31 NOTE — PATIENT INSTRUCTIONS
1) Remain on torsemide/demadex 40 mg daily. 2) RESUME metolazone/Zaroxoylyn 5 mg on Mondays, Wednesdays and Fridays. TAKE 1st dose Saturday 4/1/2023, 30 minutes prior to torsemide dose. 3) LABS-BMP, BNP, CBC on Thursday 4/6/23. 4) 2 weeks follow up with Dr. Terence Bruner. 5) Call Dr. Sammie Sagastume office if symptoms worsen. 6) Move up Pulmonary appt with Dr. Dana Barker for a hospital follow up to discuss treatment options.

## 2023-03-31 NOTE — PROGRESS NOTES
CardioMEMS HF PAD readings 3/17/2023 to increase her Lasix 80 mg a.m. and 60 mg p.m. with repeat BMP, BNP 3/21/2023 with worsening renal function with improved proBNP from 6300 3/14 to 3299 3/21. She reports today that she is \"a tiny bit better. \" However her weight is up 5# today. She then presented to [facility] 2 days after our visit on 3/26/2023 reporting worsening SOB and BLE edema. CXR showed no  vidence of pulmonary congestion. CXR mild vascular congestion. Pulmonary and Cardiology consulted. Per Pulmonology chronic hypercapnic respiratory failure, COPD, suspected severe sleep apnea with discussions of BiPAP. Patient reported prior sleep study years ago with intolerance to CPAP therapy. She is not interested in BiPAP at this time, weaned to 2L (home 4L. )  Discussions that her dyspnea multifactorial including COPD, sleep apnea, obesity, CHF, deconditioning. CardioMEMS HF PAD daily readings: 3/24 29, admitted: 3/28 23/24, 3/29 29, 3/30 32, 3/31 35. Today, she is accompanied by her . They reports she is \"about the same, no better, no worse. \" She is on oxygen therapy at 3.5/4L. They are not weighing her daily but has been completing CardioMEMS HF PAD readings. They report decrease in appetite. We again discuss proceeding with recommended BiPAP therapy but has previously declined due to prior CPAP intolerance for sleep apnea. I will ask her to move up her pulmonary follow up with Dr. Tj Valera. She is now on torsemide 40 mg daily and off of metolazone. Patient denies any symptoms of chest pain, pressure, tightness, shortness of breath at rest, nausea, vomiting, near syncope, syncope, heart racing, palpitations, dizziness, lightheaded, PND, orthopnea, wheezing, diaphoresis, BLE edema, bilateral lower extremities pain, cramping or fatigue. Review of Systems:  Constitutional: Denies falls, night sweats or fever. Fatigue improved.    HEENT: Denies new visual changes, ringing in ears, nosebleeds, nasal

## 2023-03-31 NOTE — TELEPHONE ENCOUNTER
Nephrology Referral  Received: Today  Carol ELLINGTON [facility] Practice Staff  Good Afternoon,   Ms. Nanci Kan recently discharged from the hospital. Our RN completed a Transition of Care call yesterday with patient and patient expressed interest in seeing a nephrologist. Are you able to place a referral. Patient has no preferences in regards to a kidney doctor. Will defer to Dr. Chantell Ivey judgement.      Thank you,   Carol Dash     [facility] TCM Team

## 2023-04-03 ENCOUNTER — NURSE ONLY (OUTPATIENT)
Dept: CARDIOLOGY CLINIC | Age: 78
End: 2023-04-03
Payer: MEDICARE

## 2023-04-03 ENCOUNTER — TELEPHONE (OUTPATIENT)
Dept: CARDIOLOGY CLINIC | Age: 78
End: 2023-04-03

## 2023-04-03 DIAGNOSIS — I50.22 CHRONIC SYSTOLIC (CONGESTIVE) HEART FAILURE (HCC): Primary | ICD-10-CM

## 2023-04-03 PROCEDURE — 93264 REM MNTR WRLS P-ART PRS SNR: CPT | Performed by: NURSE PRACTITIONER

## 2023-04-03 NOTE — PROGRESS NOTES
I have reviewed, interpreted and responded to the remote patient monitoring date via telephone, [employer] benji and/or Direct Call messaging at least weekly for the last 30 days. Goal PAD : 20  Difficult to achieve goal- pt recently admitted for HF/ hypervoelmia/ acute hypoxic resp failure. See hospital encounter and most recent OV encounter with Dr. Lalito García 3/31.

## 2023-04-03 NOTE — TELEPHONE ENCOUNTER
No Cardiomems reading since 3/31. She was seen by Dr. Roldan Friends in office 3/31 and was started on metolazone. Called pt - reports SOB, edema and activity intolerance are improving. Her wt is down 234>219lbs. She agrees to do Cardiomems reading later today. She will FU with Dr. Roldan Friends 4/14.

## 2023-04-05 DIAGNOSIS — I50.33 ACUTE ON CHRONIC DIASTOLIC CONGESTIVE HEART FAILURE (HCC): ICD-10-CM

## 2023-04-05 DIAGNOSIS — D64.9 CHRONIC ANEMIA: ICD-10-CM

## 2023-04-05 DIAGNOSIS — R60.9 SWELLING: ICD-10-CM

## 2023-04-05 DIAGNOSIS — R06.09 DYSPNEA ON EXERTION: ICD-10-CM

## 2023-04-05 DIAGNOSIS — N18.9 CHRONIC KIDNEY DISEASE, UNSPECIFIED CKD STAGE: ICD-10-CM

## 2023-04-05 DIAGNOSIS — I10 ESSENTIAL HYPERTENSION: ICD-10-CM

## 2023-04-05 LAB
ANION GAP SERPL CALCULATED.3IONS-SCNC: 11 MMOL/L (ref 3–16)
BUN SERPL-MCNC: 51 MG/DL (ref 7–20)
CALCIUM SERPL-MCNC: 9.2 MG/DL (ref 8.3–10.6)
CHLORIDE SERPL-SCNC: 94 MMOL/L (ref 99–110)
CO2 SERPL-SCNC: 31 MMOL/L (ref 21–32)
CREAT SERPL-MCNC: 2.1 MG/DL (ref 0.6–1.2)
DEPRECATED RDW RBC AUTO: 18.9 % (ref 12.4–15.4)
GFR SERPLBLD CREATININE-BSD FMLA CKD-EPI: 24 ML/MIN/{1.73_M2}
GLUCOSE SERPL-MCNC: 76 MG/DL (ref 70–99)
HCT VFR BLD AUTO: 25.8 % (ref 36–48)
HGB BLD-MCNC: 8.4 G/DL (ref 12–16)
MCH RBC QN AUTO: 26.9 PG (ref 26–34)
MCHC RBC AUTO-ENTMCNC: 32.6 G/DL (ref 31–36)
MCV RBC AUTO: 82.5 FL (ref 80–100)
NT-PROBNP SERPL-MCNC: 2190 PG/ML (ref 0–449)
PLATELET # BLD AUTO: 197 K/UL (ref 135–450)
PMV BLD AUTO: 10.2 FL (ref 5–10.5)
POTASSIUM SERPL-SCNC: 5.2 MMOL/L (ref 3.5–5.1)
RBC # BLD AUTO: 3.13 M/UL (ref 4–5.2)
SODIUM SERPL-SCNC: 136 MMOL/L (ref 136–145)
WBC # BLD AUTO: 10.2 K/UL (ref 4–11)

## 2023-04-06 ENCOUNTER — OFFICE VISIT (OUTPATIENT)
Dept: FAMILY MEDICINE CLINIC | Age: 78
End: 2023-04-06
Payer: MEDICARE

## 2023-04-06 VITALS — DIASTOLIC BLOOD PRESSURE: 68 MMHG | TEMPERATURE: 97.2 F | HEART RATE: 64 BPM | SYSTOLIC BLOOD PRESSURE: 136 MMHG

## 2023-04-06 DIAGNOSIS — D64.9 CHRONIC ANEMIA: Primary | ICD-10-CM

## 2023-04-06 DIAGNOSIS — E66.01 OBESITY, CLASS III, BMI 40-49.9 (MORBID OBESITY) (HCC): ICD-10-CM

## 2023-04-06 DIAGNOSIS — I50.33 ACUTE ON CHRONIC DIASTOLIC CONGESTIVE HEART FAILURE (HCC): ICD-10-CM

## 2023-04-06 DIAGNOSIS — E78.2 MIXED HYPERLIPIDEMIA: Chronic | ICD-10-CM

## 2023-04-06 DIAGNOSIS — E87.5 HYPERKALEMIA: ICD-10-CM

## 2023-04-06 DIAGNOSIS — I10 PRIMARY HYPERTENSION: Primary | ICD-10-CM

## 2023-04-06 DIAGNOSIS — E87.5 SERUM POTASSIUM ELEVATED: ICD-10-CM

## 2023-04-06 PROCEDURE — 3078F DIAST BP <80 MM HG: CPT | Performed by: FAMILY MEDICINE

## 2023-04-06 PROCEDURE — G8427 DOCREV CUR MEDS BY ELIG CLIN: HCPCS | Performed by: FAMILY MEDICINE

## 2023-04-06 PROCEDURE — 1111F DSCHRG MED/CURRENT MED MERGE: CPT | Performed by: FAMILY MEDICINE

## 2023-04-06 PROCEDURE — 1123F ACP DISCUSS/DSCN MKR DOCD: CPT | Performed by: FAMILY MEDICINE

## 2023-04-06 PROCEDURE — 3075F SYST BP GE 130 - 139MM HG: CPT | Performed by: FAMILY MEDICINE

## 2023-04-06 PROCEDURE — 99214 OFFICE O/P EST MOD 30 MIN: CPT | Performed by: FAMILY MEDICINE

## 2023-04-06 PROCEDURE — G8399 PT W/DXA RESULTS DOCUMENT: HCPCS | Performed by: FAMILY MEDICINE

## 2023-04-06 PROCEDURE — 1090F PRES/ABSN URINE INCON ASSESS: CPT | Performed by: FAMILY MEDICINE

## 2023-04-06 PROCEDURE — G8417 CALC BMI ABV UP PARAM F/U: HCPCS | Performed by: FAMILY MEDICINE

## 2023-04-06 PROCEDURE — 1036F TOBACCO NON-USER: CPT | Performed by: FAMILY MEDICINE

## 2023-04-06 RX ORDER — POTASSIUM CHLORIDE 20 MEQ/1
TABLET, EXTENDED RELEASE ORAL
Qty: 120 TABLET | Refills: 3
Start: 2023-04-06

## 2023-04-06 RX ORDER — FERROUS SULFATE 325(65) MG
325 TABLET ORAL 2 TIMES DAILY
Qty: 60 TABLET | Refills: 5 | Status: SHIPPED | OUTPATIENT
Start: 2023-04-06

## 2023-04-06 SDOH — ECONOMIC STABILITY: HOUSING INSECURITY
IN THE LAST 12 MONTHS, WAS THERE A TIME WHEN YOU DID NOT HAVE A STEADY PLACE TO SLEEP OR SLEPT IN A SHELTER (INCLUDING NOW)?: NO

## 2023-04-06 SDOH — ECONOMIC STABILITY: FOOD INSECURITY: WITHIN THE PAST 12 MONTHS, YOU WORRIED THAT YOUR FOOD WOULD RUN OUT BEFORE YOU GOT MONEY TO BUY MORE.: NEVER TRUE

## 2023-04-06 SDOH — ECONOMIC STABILITY: INCOME INSECURITY: HOW HARD IS IT FOR YOU TO PAY FOR THE VERY BASICS LIKE FOOD, HOUSING, MEDICAL CARE, AND HEATING?: NOT HARD AT ALL

## 2023-04-06 SDOH — ECONOMIC STABILITY: FOOD INSECURITY: WITHIN THE PAST 12 MONTHS, THE FOOD YOU BOUGHT JUST DIDN'T LAST AND YOU DIDN'T HAVE MONEY TO GET MORE.: NEVER TRUE

## 2023-04-06 ASSESSMENT — ENCOUNTER SYMPTOMS
DIARRHEA: 0
CONSTIPATION: 0
BLOOD IN STOOL: 0
ABDOMINAL PAIN: 0

## 2023-04-06 ASSESSMENT — PATIENT HEALTH QUESTIONNAIRE - PHQ9
SUM OF ALL RESPONSES TO PHQ9 QUESTIONS 1 & 2: 0
SUM OF ALL RESPONSES TO PHQ QUESTIONS 1-9: 0
2. FEELING DOWN, DEPRESSED OR HOPELESS: 0
SUM OF ALL RESPONSES TO PHQ QUESTIONS 1-9: 0
1. LITTLE INTEREST OR PLEASURE IN DOING THINGS: 0

## 2023-04-06 NOTE — PATIENT INSTRUCTIONS
Stop the potassium until you see cardiology next Friday and then talk to them at that time   Repeat a blood test next Thursday on the 13th   Do start the iron tablets that was called in for you  See us back in 3 months

## 2023-04-06 NOTE — PROGRESS NOTES
1  hydrALAZINE (APRESOLINE) 50 MG tablet, TAKE ONE TABLET BY MOUTH EVERY 8 HOURS, Disp: 270 tablet, Rfl: 1  rivaroxaban (XARELTO) 20 MG TABS tablet, TAKE ONE TABLET BY MOUTH DAILY WITH BREAKFAST, Disp: 30 tablet, Rfl: 11  dofetilide (TIKOSYN) 250 MCG capsule, TAKE ONE CAPSULE BY MOUTH EVERY 12 HOURS, Disp: 180 capsule, Rfl: 3  albuterol (PROVENTIL) (2.5 MG/3ML) 0.083% nebulizer solution, Take 3 mLs by nebulization every 4 hours as needed for Wheezing, Disp: , Rfl:   vitamin C (ASCORBIC ACID) 500 MG tablet, Take 1 tablet by mouth daily, Disp: , Rfl:   Multiple Vitamins-Minerals (MULTI FOR HER 50+ PO), Take 1 tablet by mouth daily, Disp: , Rfl:     No current facility-administered medications for this visit.      ---------------------------               04/06/23                      1306         ---------------------------   BP:          136/68         Site:    Left Upper Arm     Position:     Sitting        Cuff Size:   Large Adult      Pulse:         64           Temp:   97.2 °F (36.2 °C)   TempSrc:    Temporal       ---------------------------  There is no height or weight on file to calculate BMI. Wt Readings from Last 3 Encounters:  03/31/23 : 234 lb (106.1 kg)  03/28/23 : 235 lb 7.2 oz (106.8 kg)  03/24/23 : 233 lb (105.7 kg)    BP Readings from Last 3 Encounters:  04/06/23 : 136/68  03/31/23 : 124/64  03/28/23 : (!) 118/46          Review of Systems   Constitutional:  Negative for chills and fever. Respiratory: On O 2 and feels ok when using and breathing is good    Cardiovascular:  Negative for chest pain and palpitations. Gastrointestinal:  Negative for abdominal pain, blood in stool, constipation and diarrhea. Genitourinary:  Negative for difficulty urinating. Objective:   Physical Exam  Constitutional:       General: She is not in acute distress. Appearance: She is well-developed. She is not diaphoretic.    Cardiovascular:      Rate and Rhythm:

## 2023-04-14 ENCOUNTER — OFFICE VISIT (OUTPATIENT)
Dept: CARDIOLOGY CLINIC | Age: 78
End: 2023-04-14
Payer: MEDICARE

## 2023-04-14 VITALS
HEART RATE: 52 BPM | OXYGEN SATURATION: 99 % | SYSTOLIC BLOOD PRESSURE: 118 MMHG | DIASTOLIC BLOOD PRESSURE: 60 MMHG | BODY MASS INDEX: 39.09 KG/M2 | WEIGHT: 229 LBS | HEIGHT: 64 IN

## 2023-04-14 DIAGNOSIS — I10 ESSENTIAL HYPERTENSION: ICD-10-CM

## 2023-04-14 DIAGNOSIS — R06.09 DYSPNEA ON EXERTION: ICD-10-CM

## 2023-04-14 DIAGNOSIS — D64.9 CHRONIC ANEMIA: ICD-10-CM

## 2023-04-14 DIAGNOSIS — I48.0 PAF (PAROXYSMAL ATRIAL FIBRILLATION) (HCC): ICD-10-CM

## 2023-04-14 DIAGNOSIS — I50.33 ACUTE ON CHRONIC DIASTOLIC CONGESTIVE HEART FAILURE (HCC): Primary | ICD-10-CM

## 2023-04-14 DIAGNOSIS — R60.9 SWELLING: ICD-10-CM

## 2023-04-14 DIAGNOSIS — R01.1 SYSTOLIC MURMUR: ICD-10-CM

## 2023-04-14 DIAGNOSIS — J44.9 COPD, SEVERE (HCC): ICD-10-CM

## 2023-04-14 DIAGNOSIS — I25.10 CORONARY ARTERY DISEASE INVOLVING NATIVE CORONARY ARTERY OF NATIVE HEART WITHOUT ANGINA PECTORIS: ICD-10-CM

## 2023-04-14 PROCEDURE — 1123F ACP DISCUSS/DSCN MKR DOCD: CPT | Performed by: INTERNAL MEDICINE

## 2023-04-14 PROCEDURE — 3023F SPIROM DOC REV: CPT | Performed by: INTERNAL MEDICINE

## 2023-04-14 PROCEDURE — 99214 OFFICE O/P EST MOD 30 MIN: CPT | Performed by: INTERNAL MEDICINE

## 2023-04-14 PROCEDURE — 1090F PRES/ABSN URINE INCON ASSESS: CPT | Performed by: INTERNAL MEDICINE

## 2023-04-14 PROCEDURE — 3074F SYST BP LT 130 MM HG: CPT | Performed by: INTERNAL MEDICINE

## 2023-04-14 PROCEDURE — G8427 DOCREV CUR MEDS BY ELIG CLIN: HCPCS | Performed by: INTERNAL MEDICINE

## 2023-04-14 PROCEDURE — G8417 CALC BMI ABV UP PARAM F/U: HCPCS | Performed by: INTERNAL MEDICINE

## 2023-04-14 PROCEDURE — 1036F TOBACCO NON-USER: CPT | Performed by: INTERNAL MEDICINE

## 2023-04-14 PROCEDURE — G8399 PT W/DXA RESULTS DOCUMENT: HCPCS | Performed by: INTERNAL MEDICINE

## 2023-04-14 PROCEDURE — 3078F DIAST BP <80 MM HG: CPT | Performed by: INTERNAL MEDICINE

## 2023-04-14 PROCEDURE — 1111F DSCHRG MED/CURRENT MED MERGE: CPT | Performed by: INTERNAL MEDICINE

## 2023-04-14 RX ORDER — METOLAZONE 5 MG/1
TABLET ORAL
Qty: 10 TABLET | Refills: 4 | Status: SHIPPED | OUTPATIENT
Start: 2023-04-14 | End: 2023-04-20

## 2023-04-14 RX ORDER — RANOLAZINE 500 MG/1
500 TABLET, EXTENDED RELEASE ORAL 2 TIMES DAILY
Qty: 60 TABLET | Refills: 11 | Status: SHIPPED | OUTPATIENT
Start: 2023-04-14

## 2023-04-17 ENCOUNTER — TELEPHONE (OUTPATIENT)
Dept: CARDIOLOGY CLINIC | Age: 78
End: 2023-04-17

## 2023-04-17 DIAGNOSIS — Z79.899 LONG TERM CURRENT USE OF DIURETIC: ICD-10-CM

## 2023-04-17 DIAGNOSIS — N18.9 ACUTE KIDNEY INJURY SUPERIMPOSED ON CKD (HCC): ICD-10-CM

## 2023-04-17 DIAGNOSIS — I50.33 ACUTE ON CHRONIC DIASTOLIC CONGESTIVE HEART FAILURE (HCC): Primary | ICD-10-CM

## 2023-04-17 DIAGNOSIS — N17.9 ACUTE KIDNEY INJURY SUPERIMPOSED ON CKD (HCC): ICD-10-CM

## 2023-04-17 NOTE — TELEPHONE ENCOUNTER
Kai Zhang is calling asking Dr. Merissa De Paz for any Kidney Specialist he could recommend for her    She can be reached at [phone]

## 2023-04-17 NOTE — TELEPHONE ENCOUNTER
Please call patient back. Dr. Didi Gonsales. Referral placed.          [facility], [address] [place] [address], [address] [place] [address] Rd[address] [address]  Phone: [phone]

## 2023-04-19 DIAGNOSIS — R60.9 SWELLING: ICD-10-CM

## 2023-04-19 DIAGNOSIS — D64.9 CHRONIC ANEMIA: ICD-10-CM

## 2023-04-19 DIAGNOSIS — R06.09 DYSPNEA ON EXERTION: ICD-10-CM

## 2023-04-19 DIAGNOSIS — I50.33 ACUTE ON CHRONIC DIASTOLIC CONGESTIVE HEART FAILURE (HCC): ICD-10-CM

## 2023-04-19 LAB
ANION GAP SERPL CALCULATED.3IONS-SCNC: 15 MMOL/L (ref 3–16)
BUN SERPL-MCNC: 102 MG/DL (ref 7–20)
CALCIUM SERPL-MCNC: 8.8 MG/DL (ref 8.3–10.6)
CHLORIDE SERPL-SCNC: 90 MMOL/L (ref 99–110)
CO2 SERPL-SCNC: 29 MMOL/L (ref 21–32)
CREAT SERPL-MCNC: 3.7 MG/DL (ref 0.6–1.2)
DEPRECATED RDW RBC AUTO: 19.6 % (ref 12.4–15.4)
GFR SERPLBLD CREATININE-BSD FMLA CKD-EPI: 12 ML/MIN/{1.73_M2}
GLUCOSE SERPL-MCNC: 127 MG/DL (ref 70–99)
HCT VFR BLD AUTO: 23.7 % (ref 36–48)
HGB BLD-MCNC: 7.6 G/DL (ref 12–16)
MCH RBC QN AUTO: 26.8 PG (ref 26–34)
MCHC RBC AUTO-ENTMCNC: 32.1 G/DL (ref 31–36)
MCV RBC AUTO: 83.5 FL (ref 80–100)
NT-PROBNP SERPL-MCNC: 4754 PG/ML (ref 0–449)
PLATELET # BLD AUTO: 329 K/UL (ref 135–450)
PMV BLD AUTO: 9.7 FL (ref 5–10.5)
POTASSIUM SERPL-SCNC: 4.4 MMOL/L (ref 3.5–5.1)
RBC # BLD AUTO: 2.84 M/UL (ref 4–5.2)
SODIUM SERPL-SCNC: 134 MMOL/L (ref 136–145)
WBC # BLD AUTO: 13.7 K/UL (ref 4–11)

## 2023-04-20 ENCOUNTER — TELEPHONE (OUTPATIENT)
Dept: CARDIOLOGY CLINIC | Age: 78
End: 2023-04-20

## 2023-04-20 DIAGNOSIS — I50.33 ACUTE ON CHRONIC DIASTOLIC CONGESTIVE HEART FAILURE (HCC): Primary | ICD-10-CM

## 2023-04-20 DIAGNOSIS — D64.9 ACUTE ON CHRONIC ANEMIA: ICD-10-CM

## 2023-04-20 DIAGNOSIS — R06.02 SOB (SHORTNESS OF BREATH): ICD-10-CM

## 2023-04-20 DIAGNOSIS — N28.9 WORSENING RENAL FUNCTION: ICD-10-CM

## 2023-04-20 RX ORDER — METOLAZONE 5 MG/1
TABLET ORAL
Qty: 10 TABLET | Refills: 4
Start: 2023-04-20

## 2023-04-20 RX ORDER — SPIRONOLACTONE 25 MG/1
25 TABLET ORAL DAILY
Qty: 30 TABLET | Refills: 3
Start: 2023-04-20

## 2023-04-20 NOTE — TELEPHONE ENCOUNTER
[phone] call attempt to see if patient is experiencing bleeding. She report nose bleeds daily since we saw her last visit. She denies any other symptoms. Instructions provided per lab result notes today per Dr. Yuly Rubio.

## 2023-04-20 NOTE — TELEPHONE ENCOUNTER
Dr. Buffy Ring and CHANDA Chu    [facility] called with critical BUN. /Cr 3.7, NA 1345, K 4.4, proBNP 4754. Prior labs 4/13/23, 4/5/23. Dr. Buffy Ring saw her on 4/14/23 in f/u at [facility]  She is not completing daily CardioMEMS HF implant readings          Goal 20          Range 15-25          PAD/PA mean   4/18 29/44  4/13 27/37  4/10 28/39  4/9 27/42    Spoke with patient and she feels both her breathing and swelling have improved. She is not completing daily weights-\"earlier this week,\" 220#, did not weigh self this morning. Requested PAD this morning. Also reminded again to move Dr. Liliya Menendez appt 523/23 up for a HFU and discuss BIPAP.

## 2023-04-24 ENCOUNTER — HOSPITAL ENCOUNTER (INPATIENT)
Age: 78
LOS: 4 days | Discharge: HOME HEALTH | End: 2023-04-28
Attending: EMERGENCY MEDICINE | Admitting: INTERNAL MEDICINE
Payer: MEDICARE

## 2023-04-24 ENCOUNTER — APPOINTMENT (OUTPATIENT)
Dept: GENERAL RADIOLOGY | Age: 78
End: 2023-04-24
Payer: MEDICARE

## 2023-04-24 DIAGNOSIS — K92.1 GASTROINTESTINAL HEMORRHAGE WITH MELENA: ICD-10-CM

## 2023-04-24 DIAGNOSIS — N18.9 CHRONIC KIDNEY DISEASE, UNSPECIFIED CKD STAGE: ICD-10-CM

## 2023-04-24 DIAGNOSIS — R06.02 SHORTNESS OF BREATH: ICD-10-CM

## 2023-04-24 DIAGNOSIS — D64.9 ANEMIA, UNSPECIFIED TYPE: Primary | ICD-10-CM

## 2023-04-24 PROBLEM — D62 ACUTE BLOOD LOSS ANEMIA: Status: ACTIVE | Noted: 2023-04-24

## 2023-04-24 LAB
ABO + RH BLD: NORMAL
ALBUMIN SERPL-MCNC: 3.6 G/DL (ref 3.4–5)
ALBUMIN/GLOB SERPL: 1.6 {RATIO} (ref 1.1–2.2)
ALP SERPL-CCNC: 54 U/L (ref 40–129)
ALT SERPL-CCNC: 10 U/L (ref 10–40)
ANION GAP SERPL CALCULATED.3IONS-SCNC: 7 MMOL/L (ref 3–16)
ANION GAP SERPL CALCULATED.3IONS-SCNC: 7 MMOL/L (ref 3–16)
AST SERPL-CCNC: 12 U/L (ref 15–37)
BASE EXCESS BLDV CALC-SCNC: 8 MMOL/L
BASOPHILS # BLD: 0 K/UL (ref 0–0.2)
BASOPHILS NFR BLD: 0.3 %
BILIRUB SERPL-MCNC: <0.2 MG/DL (ref 0–1)
BLD GP AB SCN SERPL QL: NORMAL
BUN SERPL-MCNC: 82 MG/DL (ref 7–20)
BUN SERPL-MCNC: 86 MG/DL (ref 7–20)
CALCIUM SERPL-MCNC: 9.4 MG/DL (ref 8.3–10.6)
CALCIUM SERPL-MCNC: 9.4 MG/DL (ref 8.3–10.6)
CHLORIDE SERPL-SCNC: 95 MMOL/L (ref 99–110)
CHLORIDE SERPL-SCNC: 95 MMOL/L (ref 99–110)
CO2 BLDV-SCNC: 37 MMOL/L
CO2 SERPL-SCNC: 35 MMOL/L (ref 21–32)
CO2 SERPL-SCNC: 36 MMOL/L (ref 21–32)
COHGB MFR BLDV: 1.7 %
CREAT SERPL-MCNC: 2.1 MG/DL (ref 0.6–1.2)
CREAT SERPL-MCNC: 2.2 MG/DL (ref 0.6–1.2)
DEPRECATED RDW RBC AUTO: 19.8 % (ref 12.4–15.4)
DEPRECATED RDW RBC AUTO: 20 % (ref 12.4–15.4)
EOSINOPHIL # BLD: 0.4 K/UL (ref 0–0.6)
EOSINOPHIL NFR BLD: 2.5 %
FERRITIN SERPL IA-MCNC: 87.9 NG/ML (ref 15–150)
GFR SERPLBLD CREATININE-BSD FMLA CKD-EPI: 22 ML/MIN/{1.73_M2}
GFR SERPLBLD CREATININE-BSD FMLA CKD-EPI: 24 ML/MIN/{1.73_M2}
GLUCOSE SERPL-MCNC: 105 MG/DL (ref 70–99)
GLUCOSE SERPL-MCNC: 122 MG/DL (ref 70–99)
HCO3 BLDV-SCNC: 35 MMOL/L (ref 23–29)
HCT VFR BLD AUTO: 19 % (ref 36–48)
HCT VFR BLD AUTO: 19 % (ref 36–48)
HCT VFR BLD AUTO: 19.3 % (ref 36–48)
HEMOCCULT STL QL: ABNORMAL
HGB BLD-MCNC: 6 G/DL (ref 12–16)
HGB BLD-MCNC: 6.2 G/DL (ref 12–16)
IMMATURE RETIC FRACT: 0.64 (ref 0.21–0.37)
INR PPP: 2.98 (ref 0.84–1.16)
IRON SATN MFR SERPL: 45 % (ref 15–50)
IRON SERPL-MCNC: 141 UG/DL (ref 37–145)
LYMPHOCYTES # BLD: 0.9 K/UL (ref 1–5.1)
LYMPHOCYTES NFR BLD: 6 %
MAGNESIUM SERPL-MCNC: 2.7 MG/DL (ref 1.8–2.4)
MCH RBC QN AUTO: 26.6 PG (ref 26–34)
MCH RBC QN AUTO: 27.1 PG (ref 26–34)
MCHC RBC AUTO-ENTMCNC: 31.7 G/DL (ref 31–36)
MCHC RBC AUTO-ENTMCNC: 31.8 G/DL (ref 31–36)
MCV RBC AUTO: 83.9 FL (ref 80–100)
MCV RBC AUTO: 84.9 FL (ref 80–100)
METHGB MFR BLDV: 1 %
MONOCYTES # BLD: 1.1 K/UL (ref 0–1.3)
MONOCYTES NFR BLD: 7.1 %
NEUTROPHILS # BLD: 12.8 K/UL (ref 1.7–7.7)
NEUTROPHILS NFR BLD: 84.1 %
NT-PROBNP SERPL-MCNC: 4397 PG/ML (ref 0–449)
NT-PROBNP SERPL-MCNC: 6800 PG/ML (ref 0–449)
O2 THERAPY: ABNORMAL
PCO2 BLDV: 67 MMHG (ref 40–50)
PH BLDV: 7.32 [PH] (ref 7.35–7.45)
PLATELET # BLD AUTO: 317 K/UL (ref 135–450)
PLATELET # BLD AUTO: 334 K/UL (ref 135–450)
PMV BLD AUTO: 9.1 FL (ref 5–10.5)
PMV BLD AUTO: 9.3 FL (ref 5–10.5)
PO2 BLDV: <30 MMHG
POTASSIUM SERPL-SCNC: 4.2 MMOL/L (ref 3.5–5.1)
POTASSIUM SERPL-SCNC: 4.5 MMOL/L (ref 3.5–5.1)
PROT SERPL-MCNC: 5.9 G/DL (ref 6.4–8.2)
PROTHROMBIN TIME: 30.8 SEC (ref 11.5–14.8)
RBC # BLD AUTO: 2.26 M/UL (ref 4–5.2)
RBC # BLD AUTO: 2.28 M/UL (ref 4–5.2)
RETICS # AUTO: 0.15 M/UL (ref 0.02–0.1)
RETICS/RBC NFR AUTO: 6.76 % (ref 0.5–2.18)
SAO2 % BLDV: 33 %
SODIUM SERPL-SCNC: 137 MMOL/L (ref 136–145)
SODIUM SERPL-SCNC: 138 MMOL/L (ref 136–145)
TIBC SERPL-MCNC: 316 UG/DL (ref 260–445)
WBC # BLD AUTO: 13.9 K/UL (ref 4–11)
WBC # BLD AUTO: 15.2 K/UL (ref 4–11)

## 2023-04-24 PROCEDURE — 80053 COMPREHEN METABOLIC PANEL: CPT

## 2023-04-24 PROCEDURE — 96365 THER/PROPH/DIAG IV INF INIT: CPT

## 2023-04-24 PROCEDURE — 83880 ASSAY OF NATRIURETIC PEPTIDE: CPT

## 2023-04-24 PROCEDURE — P9016 RBC LEUKOCYTES REDUCED: HCPCS

## 2023-04-24 PROCEDURE — 6360000002 HC RX W HCPCS: Performed by: EMERGENCY MEDICINE

## 2023-04-24 PROCEDURE — 93005 ELECTROCARDIOGRAM TRACING: CPT | Performed by: EMERGENCY MEDICINE

## 2023-04-24 PROCEDURE — 36415 COLL VENOUS BLD VENIPUNCTURE: CPT

## 2023-04-24 PROCEDURE — 0W3P8ZZ CONTROL BLEEDING IN GASTROINTESTINAL TRACT, VIA NATURAL OR ARTIFICIAL OPENING ENDOSCOPIC: ICD-10-PCS | Performed by: INTERNAL MEDICINE

## 2023-04-24 PROCEDURE — 82270 OCCULT BLOOD FECES: CPT

## 2023-04-24 PROCEDURE — C9113 INJ PANTOPRAZOLE SODIUM, VIA: HCPCS | Performed by: EMERGENCY MEDICINE

## 2023-04-24 PROCEDURE — 82728 ASSAY OF FERRITIN: CPT

## 2023-04-24 PROCEDURE — 96376 TX/PRO/DX INJ SAME DRUG ADON: CPT

## 2023-04-24 PROCEDURE — 82803 BLOOD GASES ANY COMBINATION: CPT

## 2023-04-24 PROCEDURE — 85610 PROTHROMBIN TIME: CPT

## 2023-04-24 PROCEDURE — 2580000003 HC RX 258: Performed by: EMERGENCY MEDICINE

## 2023-04-24 PROCEDURE — 86900 BLOOD TYPING SEROLOGIC ABO: CPT

## 2023-04-24 PROCEDURE — 86901 BLOOD TYPING SEROLOGIC RH(D): CPT

## 2023-04-24 PROCEDURE — 30233N1 TRANSFUSION OF NONAUTOLOGOUS RED BLOOD CELLS INTO PERIPHERAL VEIN, PERCUTANEOUS APPROACH: ICD-10-PCS | Performed by: INTERNAL MEDICINE

## 2023-04-24 PROCEDURE — 1200000000 HC SEMI PRIVATE

## 2023-04-24 PROCEDURE — 99285 EMERGENCY DEPT VISIT HI MDM: CPT

## 2023-04-24 PROCEDURE — 86850 RBC ANTIBODY SCREEN: CPT

## 2023-04-24 PROCEDURE — 83550 IRON BINDING TEST: CPT

## 2023-04-24 PROCEDURE — 86923 COMPATIBILITY TEST ELECTRIC: CPT

## 2023-04-24 PROCEDURE — 71045 X-RAY EXAM CHEST 1 VIEW: CPT

## 2023-04-24 PROCEDURE — 83735 ASSAY OF MAGNESIUM: CPT

## 2023-04-24 PROCEDURE — 85025 COMPLETE CBC W/AUTO DIFF WBC: CPT

## 2023-04-24 PROCEDURE — 83540 ASSAY OF IRON: CPT

## 2023-04-24 PROCEDURE — 85045 AUTOMATED RETICULOCYTE COUNT: CPT

## 2023-04-24 RX ORDER — SODIUM CHLORIDE 9 MG/ML
INJECTION, SOLUTION INTRAVENOUS PRN
Status: DISCONTINUED | OUTPATIENT
Start: 2023-04-24 | End: 2023-04-26

## 2023-04-24 RX ORDER — DILTIAZEM HYDROCHLORIDE 120 MG/1
120 CAPSULE, COATED, EXTENDED RELEASE ORAL 2 TIMES DAILY
Status: DISCONTINUED | OUTPATIENT
Start: 2023-04-25 | End: 2023-04-25

## 2023-04-24 RX ORDER — SODIUM CHLORIDE 0.9 % (FLUSH) 0.9 %
10 SYRINGE (ML) INJECTION PRN
Status: DISCONTINUED | OUTPATIENT
Start: 2023-04-24 | End: 2023-04-28 | Stop reason: HOSPADM

## 2023-04-24 RX ORDER — SODIUM CHLORIDE 0.9 % (FLUSH) 0.9 %
10 SYRINGE (ML) INJECTION EVERY 12 HOURS SCHEDULED
Status: DISCONTINUED | OUTPATIENT
Start: 2023-04-25 | End: 2023-04-28 | Stop reason: HOSPADM

## 2023-04-24 RX ORDER — DOFETILIDE 0.25 MG/1
250 CAPSULE ORAL EVERY 12 HOURS SCHEDULED
Status: DISCONTINUED | OUTPATIENT
Start: 2023-04-25 | End: 2023-04-28 | Stop reason: HOSPADM

## 2023-04-24 RX ORDER — CLOPIDOGREL BISULFATE 75 MG/1
75 TABLET ORAL DAILY
Status: DISCONTINUED | OUTPATIENT
Start: 2023-04-25 | End: 2023-04-27

## 2023-04-24 RX ORDER — POLYETHYLENE GLYCOL 3350 17 G/17G
17 POWDER, FOR SOLUTION ORAL DAILY PRN
Status: DISCONTINUED | OUTPATIENT
Start: 2023-04-24 | End: 2023-04-28 | Stop reason: HOSPADM

## 2023-04-24 RX ORDER — SODIUM CHLORIDE 9 MG/ML
INJECTION, SOLUTION INTRAVENOUS PRN
Status: DISCONTINUED | OUTPATIENT
Start: 2023-04-24 | End: 2023-04-28 | Stop reason: HOSPADM

## 2023-04-24 RX ORDER — ACETAMINOPHEN 650 MG/1
650 SUPPOSITORY RECTAL EVERY 4 HOURS PRN
Status: DISCONTINUED | OUTPATIENT
Start: 2023-04-24 | End: 2023-04-28 | Stop reason: HOSPADM

## 2023-04-24 RX ORDER — METOPROLOL SUCCINATE 25 MG/1
12.5 TABLET, EXTENDED RELEASE ORAL 2 TIMES DAILY
Status: DISCONTINUED | OUTPATIENT
Start: 2023-04-25 | End: 2023-04-25

## 2023-04-24 RX ORDER — ACETAMINOPHEN 325 MG/1
650 TABLET ORAL EVERY 4 HOURS PRN
Status: DISCONTINUED | OUTPATIENT
Start: 2023-04-24 | End: 2023-04-28 | Stop reason: HOSPADM

## 2023-04-24 RX ORDER — ROSUVASTATIN CALCIUM 20 MG/1
20 TABLET, COATED ORAL DAILY
Status: DISCONTINUED | OUTPATIENT
Start: 2023-04-25 | End: 2023-04-28 | Stop reason: HOSPADM

## 2023-04-24 RX ORDER — ONDANSETRON 2 MG/ML
4 INJECTION INTRAMUSCULAR; INTRAVENOUS EVERY 4 HOURS PRN
Status: DISCONTINUED | OUTPATIENT
Start: 2023-04-24 | End: 2023-04-28 | Stop reason: HOSPADM

## 2023-04-24 RX ORDER — ISOSORBIDE MONONITRATE 60 MG/1
120 TABLET, EXTENDED RELEASE ORAL DAILY
Status: DISCONTINUED | OUTPATIENT
Start: 2023-04-25 | End: 2023-04-28 | Stop reason: HOSPADM

## 2023-04-24 RX ORDER — SODIUM CHLORIDE 9 MG/ML
INJECTION, SOLUTION INTRAVENOUS CONTINUOUS
Status: DISCONTINUED | OUTPATIENT
Start: 2023-04-25 | End: 2023-04-26

## 2023-04-24 RX ORDER — HYDRALAZINE HYDROCHLORIDE 50 MG/1
50 TABLET, FILM COATED ORAL EVERY 8 HOURS SCHEDULED
Status: DISCONTINUED | OUTPATIENT
Start: 2023-04-25 | End: 2023-04-28 | Stop reason: HOSPADM

## 2023-04-24 RX ORDER — ALBUTEROL SULFATE 2.5 MG/3ML
2.5 SOLUTION RESPIRATORY (INHALATION) EVERY 4 HOURS PRN
Status: DISCONTINUED | OUTPATIENT
Start: 2023-04-24 | End: 2023-04-28 | Stop reason: HOSPADM

## 2023-04-24 RX ADMIN — SODIUM CHLORIDE 80 MG: 9 INJECTION, SOLUTION INTRAVENOUS at 19:39

## 2023-04-24 RX ADMIN — SODIUM CHLORIDE 8 MG/HR: 9 INJECTION, SOLUTION INTRAVENOUS at 20:18

## 2023-04-24 ASSESSMENT — ENCOUNTER SYMPTOMS
CONSTIPATION: 0
DIARRHEA: 0
SHORTNESS OF BREATH: 1
VOMITING: 0
SORE THROAT: 0
CHEST TIGHTNESS: 0
ABDOMINAL PAIN: 0
BLOOD IN STOOL: 1
COUGH: 0
NAUSEA: 0

## 2023-04-24 ASSESSMENT — PAIN SCALES - GENERAL
PAINLEVEL_OUTOF10: 0
PAINLEVEL_OUTOF10: 0

## 2023-04-24 ASSESSMENT — PAIN - FUNCTIONAL ASSESSMENT: PAIN_FUNCTIONAL_ASSESSMENT: NONE - DENIES PAIN

## 2023-04-24 NOTE — ED NOTES
Bedside report given to receiving CABRERA Henderson, all questions answered.       Evie López RN  04/24/23 1652

## 2023-04-24 NOTE — ED NOTES
Second IV obtained. Purewick in place. Patient provided new warm blanket. Patient declined any other needs at this time.       Edson Leon RN  04/24/23 1949

## 2023-04-24 NOTE — ED NOTES
Consent obtained for blood transfusion. Consent form signed and at bedside. ED MD and this RN discussed risks and benefits with pt and pt's , both verbalized understanding, deny any further questions and agree with blood transfusion.         Ed CABRERA Alvarez  04/24/23 6393

## 2023-04-24 NOTE — ED NOTES
Pt arrived to ED rm 15 from [place] [address] at this time. Pt is alert and oriented x4, bradycardic with hr in the 30's-40's, respirations easy and even, o2 sat 100% on 4L o2/nc which she wears continuous at home. Pt denies any chest pain, states she has had increased sob and fatigue for the \"past few days\". Pt placed on cardiac monitor, EKG obtained in AUGUST, IV in place and ordered labs obtained and sent to lab. ED MD Telles saw pt in [place] [address] and made aware of heart rate. Call light in reach, will continue to monitor closely.      Kaitlin Manuel, RN  04/24/23 [place], RN  04/24/23 3476

## 2023-04-24 NOTE — ED TRIAGE NOTES
Pt arrives via hospital wheelchair for eval of sob and fatigue, pt then states called to come to ed d/t low blood levels. Pt is on chronic O2 at 4L , Pt is a/ox4, rsp nonlabored and pwd.

## 2023-04-24 NOTE — CONSENT
Informed Consent for Blood Component Transfusion Note    I have discussed with the patient the rationale for blood component transfusion; its benefits in treating or preventing fatigue, organ damage, or death; and its risk which includes mild transfusion reactions, rare risk of blood borne infection, or more serious but rare reactions. I have discussed the alternatives to transfusion, including the risk and consequences of not receiving transfusion. The patient had an opportunity to ask questions and had agreed to proceed with transfusion of blood components.     Electronically signed by Hemant Joes MD on 4/24/23 at 6:19 PM EDT

## 2023-04-24 NOTE — ED NOTES
This RN remained at bedside for first 15 min of blood transfusion. No complications noted or reported, pt tolerating transfusion without difficulty. Will continue to monitor closely. Call light in reach.       Dina Wilson RN  04/24/23 1929

## 2023-04-24 NOTE — ED PROVIDER NOTES
[address]      Pt Name: Jorge Aleman  MRN: [id number]  [place] 1945  Date of evaluation: 4/24/2023  Provider: Liz Thomason MD    CHIEF COMPLAINT       Chief Complaint   Patient presents with    Shortness of Breath    Fatigue     Pt arrives via hospital wheelchair for eval of sob and fatigue, pt then states called to come to ed d/t low blood levels. Pt is on chronic O2 at 4L        HISTORY OF PRESENT ILLNESS    Jorge Aleman is a 69 y.o. female who  has a past medical history of AAA (abdominal aortic aneurysm) ([facility] [address] AAA (abdominal aortic aneurysm) without rupture (HCC), Atrial fibrillation ([facility] [address] CAD (coronary artery disease), CHF (congestive heart failure) ([facility] [address] COPD (chronic obstructive pulmonary disease) ([facility] [address] History of blood clots, Hyperlipidemia, and Hypertension. who presents to the emergency department by private vehicle for evaluation of abnormal blood work on outpatient blood testing done by her cardiologist 4 days ago. Patient states she was called and told that she needed to come for evaluation because of low blood count and worsening kidney function. Patient does have history of CHF and states that she has been taking \"water pills\". Patient states that she also does take Xarelto for blood thinner and has been taking it as prescribed. Patient has history of CHF and COPD and wears 4 L nasal cannula at baseline. States she does feel short of breath with exertion. Denies any increased use of oxygen at home. No chest pain. No abdominal pain. No nausea or vomiting. Patient is unsure what her dry weight is. Denies any fevers or chills. No cough or sputum production. Normal urinary habits. Patient states she was constipated last week and was able to pass a large hard stool which caused her to have some bleeding afterward.   Patient states she did bleed quite a bit after passing a large stool ball but thinks the

## 2023-04-24 NOTE — ED NOTES
1 unit of PRBC began infusing at this time. Blood and pt verified by this RN and RN Jh Christine. This RN to remain at bedside initial 15 min of transfusion per hospital policy.      Jamshid Gupta RN  04/24/23 Parke Rubinstein

## 2023-04-25 PROBLEM — K92.2 ACUTE GI BLEEDING: Status: ACTIVE | Noted: 2023-04-25

## 2023-04-25 PROBLEM — R19.5 OCCULT BLOOD POSITIVE STOOL: Status: ACTIVE | Noted: 2023-04-25

## 2023-04-25 PROBLEM — J96.11 CHRONIC RESPIRATORY FAILURE WITH HYPOXIA (HCC): Status: ACTIVE | Noted: 2023-04-25

## 2023-04-25 PROBLEM — N18.30 CHRONIC RENAL FAILURE, STAGE 3 (MODERATE) (HCC): Status: ACTIVE | Noted: 2023-04-25

## 2023-04-25 PROBLEM — J44.9 COPD (CHRONIC OBSTRUCTIVE PULMONARY DISEASE) (HCC): Status: ACTIVE | Noted: 2023-04-25

## 2023-04-25 PROBLEM — I25.10 CAD (CORONARY ARTERY DISEASE): Status: ACTIVE | Noted: 2023-04-25

## 2023-04-25 PROBLEM — R00.1 BRADYCARDIA: Status: ACTIVE | Noted: 2023-04-25

## 2023-04-25 LAB
ANION GAP SERPL CALCULATED.3IONS-SCNC: 9 MMOL/L (ref 3–16)
BASOPHILS # BLD: 0.1 K/UL (ref 0–0.2)
BASOPHILS NFR BLD: 0.4 %
BLOOD BANK DISPENSE STATUS: NORMAL
BLOOD BANK PRODUCT CODE: NORMAL
BPU ID: NORMAL
BUN SERPL-MCNC: 81 MG/DL (ref 7–20)
CALCIUM SERPL-MCNC: 8.9 MG/DL (ref 8.3–10.6)
CHLORIDE SERPL-SCNC: 99 MMOL/L (ref 99–110)
CK SERPL-CCNC: 22 U/L (ref 26–192)
CO2 SERPL-SCNC: 31 MMOL/L (ref 21–32)
CREAT SERPL-MCNC: 2 MG/DL (ref 0.6–1.2)
DEPRECATED RDW RBC AUTO: 18.9 % (ref 12.4–15.4)
DESCRIPTION BLOOD BANK: NORMAL
EKG ATRIAL RATE: 44 BPM
EKG DIAGNOSIS: NORMAL
EKG P AXIS: 19 DEGREES
EKG P-R INTERVAL: 172 MS
EKG Q-T INTERVAL: 480 MS
EKG QRS DURATION: 92 MS
EKG QTC CALCULATION (BAZETT): 410 MS
EKG R AXIS: -8 DEGREES
EKG T AXIS: 14 DEGREES
EKG VENTRICULAR RATE: 44 BPM
EOSINOPHIL # BLD: 0.5 K/UL (ref 0–0.6)
EOSINOPHIL NFR BLD: 2.9 %
FOLATE SERPL-MCNC: >20 NG/ML (ref 4.78–24.2)
GFR SERPLBLD CREATININE-BSD FMLA CKD-EPI: 25 ML/MIN/{1.73_M2}
GLUCOSE BLD-MCNC: 127 MG/DL (ref 70–99)
GLUCOSE SERPL-MCNC: 97 MG/DL (ref 70–99)
HCT VFR BLD AUTO: 20.6 % (ref 36–48)
HCT VFR BLD AUTO: 24 % (ref 36–48)
HGB BLD-MCNC: 6.8 G/DL (ref 12–16)
HGB BLD-MCNC: 7.4 G/DL (ref 12–16)
LYMPHOCYTES # BLD: 1.2 K/UL (ref 1–5.1)
LYMPHOCYTES NFR BLD: 7 %
MCH RBC QN AUTO: 27.7 PG (ref 26–34)
MCHC RBC AUTO-ENTMCNC: 32.9 G/DL (ref 31–36)
MCV RBC AUTO: 84.3 FL (ref 80–100)
MONOCYTES # BLD: 1 K/UL (ref 0–1.3)
MONOCYTES NFR BLD: 6.1 %
NEUTROPHILS # BLD: 14.2 K/UL (ref 1.7–7.7)
NEUTROPHILS NFR BLD: 83.6 %
PERFORMED ON: ABNORMAL
PLATELET # BLD AUTO: 282 K/UL (ref 135–450)
PMV BLD AUTO: 8.9 FL (ref 5–10.5)
POTASSIUM SERPL-SCNC: 4.2 MMOL/L (ref 3.5–5.1)
RBC # BLD AUTO: 2.45 M/UL (ref 4–5.2)
SODIUM SERPL-SCNC: 139 MMOL/L (ref 136–145)
URATE SERPL-MCNC: 10 MG/DL (ref 2.6–6)
VIT B12 SERPL-MCNC: 1663 PG/ML (ref 211–911)
WBC # BLD AUTO: 17 K/UL (ref 4–11)

## 2023-04-25 PROCEDURE — C9113 INJ PANTOPRAZOLE SODIUM, VIA: HCPCS | Performed by: INTERNAL MEDICINE

## 2023-04-25 PROCEDURE — 6370000000 HC RX 637 (ALT 250 FOR IP): Performed by: INTERNAL MEDICINE

## 2023-04-25 PROCEDURE — 85025 COMPLETE CBC W/AUTO DIFF WBC: CPT

## 2023-04-25 PROCEDURE — 36415 COLL VENOUS BLD VENIPUNCTURE: CPT

## 2023-04-25 PROCEDURE — 82607 VITAMIN B-12: CPT

## 2023-04-25 PROCEDURE — 94761 N-INVAS EAR/PLS OXIMETRY MLT: CPT

## 2023-04-25 PROCEDURE — 84550 ASSAY OF BLOOD/URIC ACID: CPT

## 2023-04-25 PROCEDURE — 2700000000 HC OXYGEN THERAPY PER DAY

## 2023-04-25 PROCEDURE — 85018 HEMOGLOBIN: CPT

## 2023-04-25 PROCEDURE — 80048 BASIC METABOLIC PNL TOTAL CA: CPT

## 2023-04-25 PROCEDURE — 97162 PT EVAL MOD COMPLEX 30 MIN: CPT

## 2023-04-25 PROCEDURE — 2060000000 HC ICU INTERMEDIATE R&B

## 2023-04-25 PROCEDURE — 85014 HEMATOCRIT: CPT

## 2023-04-25 PROCEDURE — 99222 1ST HOSP IP/OBS MODERATE 55: CPT | Performed by: INTERNAL MEDICINE

## 2023-04-25 PROCEDURE — 94640 AIRWAY INHALATION TREATMENT: CPT

## 2023-04-25 PROCEDURE — 2580000003 HC RX 258: Performed by: INTERNAL MEDICINE

## 2023-04-25 PROCEDURE — 82746 ASSAY OF FOLIC ACID SERUM: CPT

## 2023-04-25 PROCEDURE — 6360000002 HC RX W HCPCS: Performed by: INTERNAL MEDICINE

## 2023-04-25 PROCEDURE — 82550 ASSAY OF CK (CPK): CPT

## 2023-04-25 PROCEDURE — 36430 TRANSFUSION BLD/BLD COMPNT: CPT

## 2023-04-25 PROCEDURE — 97530 THERAPEUTIC ACTIVITIES: CPT

## 2023-04-25 PROCEDURE — 93010 ELECTROCARDIOGRAM REPORT: CPT | Performed by: INTERNAL MEDICINE

## 2023-04-25 PROCEDURE — 84238 ASSAY NONENDOCRINE RECEPTOR: CPT

## 2023-04-25 RX ORDER — SODIUM CHLORIDE 9 MG/ML
INJECTION, SOLUTION INTRAVENOUS PRN
Status: DISCONTINUED | OUTPATIENT
Start: 2023-04-25 | End: 2023-04-26

## 2023-04-25 RX ORDER — DIPHENHYDRAMINE HCL 25 MG
25 TABLET ORAL ONCE
Status: COMPLETED | OUTPATIENT
Start: 2023-04-25 | End: 2023-04-25

## 2023-04-25 RX ADMIN — SODIUM CHLORIDE 8 MG/HR: 9 INJECTION, SOLUTION INTRAVENOUS at 11:32

## 2023-04-25 RX ADMIN — SODIUM CHLORIDE 8 MG/HR: 9 INJECTION, SOLUTION INTRAVENOUS at 23:15

## 2023-04-25 RX ADMIN — Medication 10 ML: at 20:38

## 2023-04-25 RX ADMIN — MOMETASONE FUROATE AND FORMOTEROL FUMARATE DIHYDRATE 2 PUFF: 200; 5 AEROSOL RESPIRATORY (INHALATION) at 20:27

## 2023-04-25 RX ADMIN — HYDRALAZINE HYDROCHLORIDE 50 MG: 50 TABLET, FILM COATED ORAL at 20:39

## 2023-04-25 RX ADMIN — DIPHENHYDRAMINE HCL 25 MG: 25 TABLET ORAL at 20:38

## 2023-04-25 RX ADMIN — DOFETILIDE 250 MCG: 0.25 CAPSULE ORAL at 20:39

## 2023-04-25 RX ADMIN — HYDRALAZINE HYDROCHLORIDE 50 MG: 50 TABLET, FILM COATED ORAL at 01:18

## 2023-04-25 RX ADMIN — HYDRALAZINE HYDROCHLORIDE 50 MG: 50 TABLET, FILM COATED ORAL at 06:34

## 2023-04-25 RX ADMIN — DOFETILIDE 250 MCG: 0.25 CAPSULE ORAL at 08:20

## 2023-04-25 RX ADMIN — HYDRALAZINE HYDROCHLORIDE 50 MG: 50 TABLET, FILM COATED ORAL at 15:35

## 2023-04-25 RX ADMIN — MOMETASONE FUROATE AND FORMOTEROL FUMARATE DIHYDRATE 2 PUFF: 200; 5 AEROSOL RESPIRATORY (INHALATION) at 12:24

## 2023-04-25 RX ADMIN — ISOSORBIDE MONONITRATE 120 MG: 60 TABLET, EXTENDED RELEASE ORAL at 08:15

## 2023-04-25 RX ADMIN — TIOTROPIUM BROMIDE INHALATION SPRAY 2 PUFF: 3.12 SPRAY, METERED RESPIRATORY (INHALATION) at 12:25

## 2023-04-25 RX ADMIN — ROSUVASTATIN CALCIUM 20 MG: 20 TABLET, FILM COATED ORAL at 08:15

## 2023-04-25 RX ADMIN — CLOPIDOGREL BISULFATE 75 MG: 75 TABLET ORAL at 08:14

## 2023-04-25 RX ADMIN — SODIUM CHLORIDE: 9 INJECTION, SOLUTION INTRAVENOUS at 06:38

## 2023-04-25 RX ADMIN — SODIUM CHLORIDE: 9 INJECTION, SOLUTION INTRAVENOUS at 14:30

## 2023-04-25 NOTE — CONSENT
Informed Consent for Blood Component Transfusion Note    I have discussed with the patient the rationale for blood component transfusion; its benefits in treating or preventing fatigue, organ damage, or death; and its risk which includes mild transfusion reactions, rare risk of blood borne infection, or more serious but rare reactions. I have discussed the alternatives to transfusion, including the risk and consequences of not receiving transfusion. The patient had an opportunity to ask questions and had agreed to proceed with transfusion of blood components.     Electronically signed by Becca Bradford MD on 4/25/23 at 10:53 AM EDT

## 2023-04-25 NOTE — ACP (ADVANCE CARE PLANNING)
Advance Care Planning     Advance Care Planning Activator (Inpatient)  Conversation Note      Date of ACP Conversation: 4/25/2023     Conversation Conducted with: Patient with Decision Making Capacity    ACP Activator: Klever Abad RN    Health Care Decision Maker:     Current Designated Health Care Decision Maker:     Primary Decision Maker: Rinku Vivas Spouse - [phone]    Secondary Decision Maker: Mitali Killian Child - [phone]    Care Preferences    Ventilation: \"If you were in your present state of health and suddenly became very ill and were unable to breathe on your own, what would your preference be about the use of a ventilator (breathing machine) if it were available to you? \"      Would the patient desire the use of ventilator (breathing machine)?: yes    \"If your health worsens and it becomes clear that your chance of recovery is unlikely, what would your preference be about the use of a ventilator (breathing machine) if it were available to you? \"     Would the patient desire the use of ventilator (breathing machine)?: No      Resuscitation  \"CPR works best to restart the heart when there is a sudden event, like a heart attack, in someone who is otherwise healthy. Unfortunately, CPR does not typically restart the heart for people who have serious health conditions or who are very sick. \"    \"In the event your heart stopped as a result of an underlying serious health condition, would you want attempts to be made to restart your heart (answer \"yes\" for attempt to resuscitate) or would you prefer a natural death (answer \"no\" for do not attempt to resuscitate)? \" yes       [] Yes   [] No   Educated Patient / Jesse Monge regarding differences between Advance Directives and portable DNR orders.     Length of ACP Conversation in minutes:  5    Conversation Outcomes:  ACP discussion completed    Follow-up plan:    [] Schedule follow-up conversation to continue planning  [] Referred individual to

## 2023-04-25 NOTE — CARE COORDINATION
04/25/23 1805   Readmission Assessment   Number of Days since last admission? 8-30 days   Previous Disposition Home with Family   Who is being Interviewed Patient   What was the patient's/caregiver's perception as to why they think they needed to return back to the hospital? Other (Comment)  (SOB and fatigue)   Did you visit your Primary Care Physician after you left the hospital, before you returned this time? Yes   Did you see a specialist, such as Cardiac, Pulmonary, Orthopedic Physician, etc. after you left the hospital? Yes   Who advised the patient to return to the hospital? Self-referral   Does the patient report anything that got in the way of taking their medications? No   In our efforts to provide the best possible care to you and others like you, can you think of anything that we could have done to help you after you left the hospital the first time, so that you might not have needed to return so soon?  Other (Comment)  (\"Nothing that I can think of.\")     Electronically signed by Roscoe Alonso RN on 4/25/2023 at 6:07 PM

## 2023-04-25 NOTE — CARE COORDINATION
Case Management Assessment  Initial Evaluation    Date/Time of Evaluation: 4/25/2023 6:12 PM  Assessment Completed by: Klever Abad RN    If patient is discharged prior to next notation, then this note serves as note for discharge by case management. Patient Name: Ponce Segal                   YOB: 1945  Diagnosis: Shortness of breath [R06.02]  Acute blood loss anemia [D62]  Gastrointestinal hemorrhage with melena [K92.1]  Anemia, unspecified type [D64.9]  Chronic kidney disease, unspecified CKD stage [N18.9]                   Date / Time: 4/24/2023  5:19 PM    Patient Admission Status: Inpatient   Readmission Risk (Low < 19, Mod (19-27), High > 27): Readmission Risk Score: 27.5    Current PCP: Nancy Thomas MD  PCP verified by CM? Yes    Chart Reviewed: Yes      History Provided by: Spouse (patient drowsy at time of bedside assessment.)  Patient Orientation: Alert and Oriented    Patient Cognition: Alert    Hospitalization in the last 30 days (Readmission):  Yes    If yes, Readmission Assessment in  Navigator will be completed. Advance Directives:      Code Status: Full Code   Patient's Primary Decision Maker is: Named in Scanned ACP Document    Primary Decision Maker: Rinku Vivas Spouse - [phone]    Secondary Decision Maker: Mitali Lotts Child - [phone]    Discharge Planning:    Patient lives with: Spouse/Significant Other Type of Home: House  Primary Care Giver: Self  Patient Support Systems include: Spouse/Significant Other   Current Financial resources: Medicare  Current community resources: None  Current services prior to admission: Oxygen Therapy            Current DME: Roger Dakins, Wheelchair, Oxygen Therapy (Comment)            Type of Home Care services:  None    ADLS  Prior functional level:  Independent in ADLs/IADLs, Other (see comment) (PRN assist from )  Current functional level: Assistance with the following:, Bathing, Dressing, Toileting, Cooking,

## 2023-04-25 NOTE — DISCHARGE INSTRUCTIONS
Extra Heart Failure sites:     https://Barnes [employer] Noble.com/   --- this is American Heart Association interactive Healthier Living with Heart Failure guidebook. Please click hyperlink or copy / paste link into search bar. Use your mouse to scroll through the pages. Lots of information about weight monitoring, diet tips, activity, meds, etc    HF [place] maxwell  -- this is a free smart phone maxwell available for iPhone and Android download. Use your phone to track sodium / fluid intake, zone tool symptom tracking, weights, medications, etc. Click on this hyperlink  HF [place] Maxwell   for QR code for easy download. DASH (Dietary Approach to Stop Hypertension) diet --  SeekAlumni.no -- this diet is a flexible eating plan that promotes heart healthy eating style. Click on hyperlink or copy / paste link into search bar. Lots of low sodium recipes and tips.     CigarRepair.ca  -- more free recipes

## 2023-04-26 ENCOUNTER — APPOINTMENT (OUTPATIENT)
Dept: GENERAL RADIOLOGY | Age: 78
End: 2023-04-26
Payer: MEDICARE

## 2023-04-26 ENCOUNTER — ANESTHESIA (OUTPATIENT)
Dept: ENDOSCOPY | Age: 78
End: 2023-04-26
Payer: MEDICARE

## 2023-04-26 ENCOUNTER — ANESTHESIA EVENT (OUTPATIENT)
Dept: ENDOSCOPY | Age: 78
End: 2023-04-26
Payer: MEDICARE

## 2023-04-26 ENCOUNTER — TELEPHONE (OUTPATIENT)
Dept: NON INVASIVE DIAGNOSTICS | Age: 78
End: 2023-04-26

## 2023-04-26 PROBLEM — D64.9 ACUTE ON CHRONIC ANEMIA: Status: ACTIVE | Noted: 2023-04-26

## 2023-04-26 PROBLEM — I10 PRIMARY HYPERTENSION: Status: ACTIVE | Noted: 2023-04-26

## 2023-04-26 PROBLEM — N18.31 ACUTE RENAL FAILURE SUPERIMPOSED ON STAGE 3A CHRONIC KIDNEY DISEASE (HCC): Status: ACTIVE | Noted: 2023-04-26

## 2023-04-26 PROBLEM — N17.9 ACUTE RENAL FAILURE SUPERIMPOSED ON STAGE 3A CHRONIC KIDNEY DISEASE (HCC): Status: ACTIVE | Noted: 2023-04-26

## 2023-04-26 LAB
ALBUMIN SERPL-MCNC: 3.1 G/DL (ref 3.4–5)
ALBUMIN/GLOB SERPL: 1.3 {RATIO} (ref 1.1–2.2)
ALP SERPL-CCNC: 49 U/L (ref 40–129)
ALT SERPL-CCNC: 8 U/L (ref 10–40)
ANION GAP SERPL CALCULATED.3IONS-SCNC: 9 MMOL/L (ref 3–16)
AST SERPL-CCNC: 10 U/L (ref 15–37)
BACTERIA URNS QL MICRO: ABNORMAL /HPF
BASOPHILS # BLD: 0 K/UL (ref 0–0.2)
BASOPHILS NFR BLD: 0.3 %
BILIRUB SERPL-MCNC: <0.2 MG/DL (ref 0–1)
BILIRUB UR QL STRIP.AUTO: NEGATIVE
BLOOD BANK DISPENSE STATUS: NORMAL
BLOOD BANK DISPENSE STATUS: NORMAL
BLOOD BANK PRODUCT CODE: NORMAL
BLOOD BANK PRODUCT CODE: NORMAL
BPU ID: NORMAL
BPU ID: NORMAL
BUN SERPL-MCNC: 64 MG/DL (ref 7–20)
CALCIUM SERPL-MCNC: 8.4 MG/DL (ref 8.3–10.6)
CHLORIDE SERPL-SCNC: 103 MMOL/L (ref 99–110)
CLARITY UR: ABNORMAL
CO2 SERPL-SCNC: 28 MMOL/L (ref 21–32)
COLOR UR: YELLOW
CREAT SERPL-MCNC: 1.7 MG/DL (ref 0.6–1.2)
CREAT UR-MCNC: 46.3 MG/DL (ref 28–259)
DEPRECATED RDW RBC AUTO: 19.8 % (ref 12.4–15.4)
DESCRIPTION BLOOD BANK: NORMAL
DESCRIPTION BLOOD BANK: NORMAL
EKG ATRIAL RATE: 33 BPM
EKG DIAGNOSIS: NORMAL
EKG Q-T INTERVAL: 382 MS
EKG QRS DURATION: 84 MS
EKG QTC CALCULATION (BAZETT): 472 MS
EKG R AXIS: -19 DEGREES
EKG T AXIS: 133 DEGREES
EKG VENTRICULAR RATE: 92 BPM
EOSINOPHIL # BLD: 0.5 K/UL (ref 0–0.6)
EOSINOPHIL NFR BLD: 3.4 %
EPI CELLS #/AREA URNS AUTO: 0 /HPF (ref 0–5)
GFR SERPLBLD CREATININE-BSD FMLA CKD-EPI: 31 ML/MIN/{1.73_M2}
GLUCOSE BLD-MCNC: 103 MG/DL (ref 70–99)
GLUCOSE BLD-MCNC: 108 MG/DL (ref 70–99)
GLUCOSE BLD-MCNC: 114 MG/DL (ref 70–99)
GLUCOSE BLD-MCNC: 132 MG/DL (ref 70–99)
GLUCOSE BLD-MCNC: 139 MG/DL (ref 70–99)
GLUCOSE SERPL-MCNC: 105 MG/DL (ref 70–99)
GLUCOSE UR STRIP.AUTO-MCNC: NEGATIVE MG/DL
HCT VFR BLD AUTO: 21 % (ref 36–48)
HCT VFR BLD AUTO: 26.3 % (ref 36–48)
HGB BLD-MCNC: 6.8 G/DL (ref 12–16)
HGB BLD-MCNC: 8.3 G/DL (ref 12–16)
HGB UR QL STRIP.AUTO: NEGATIVE
HYALINE CASTS #/AREA URNS AUTO: 1 /LPF (ref 0–8)
KETONES UR STRIP.AUTO-MCNC: NEGATIVE MG/DL
LEUKOCYTE ESTERASE UR QL STRIP.AUTO: ABNORMAL
LYMPHOCYTES # BLD: 0.9 K/UL (ref 1–5.1)
LYMPHOCYTES NFR BLD: 6.4 %
MAGNESIUM SERPL-MCNC: 2.7 MG/DL (ref 1.8–2.4)
MCH RBC QN AUTO: 27.2 PG (ref 26–34)
MCHC RBC AUTO-ENTMCNC: 32.3 G/DL (ref 31–36)
MCV RBC AUTO: 84.1 FL (ref 80–100)
MONOCYTES # BLD: 0.9 K/UL (ref 0–1.3)
MONOCYTES NFR BLD: 6.1 %
NEUTROPHILS # BLD: 12 K/UL (ref 1.7–7.7)
NEUTROPHILS NFR BLD: 83.8 %
NITRITE UR QL STRIP.AUTO: NEGATIVE
PERFORMED ON: ABNORMAL
PH UR STRIP.AUTO: 5.5 [PH] (ref 5–8)
PHOSPHATE SERPL-MCNC: 3.6 MG/DL (ref 2.5–4.9)
PLATELET # BLD AUTO: 234 K/UL (ref 135–450)
PMV BLD AUTO: 8.5 FL (ref 5–10.5)
POTASSIUM SERPL-SCNC: 4 MMOL/L (ref 3.5–5.1)
PROT SERPL-MCNC: 5.4 G/DL (ref 6.4–8.2)
PROT UR STRIP.AUTO-MCNC: ABNORMAL MG/DL
PROT UR-MCNC: 13 MG/DL
PROT/CREAT UR-RTO: 0.3 MG/DL
RBC # BLD AUTO: 2.5 M/UL (ref 4–5.2)
RBC CLUMPS #/AREA URNS AUTO: 0 /HPF (ref 0–4)
SODIUM SERPL-SCNC: 140 MMOL/L (ref 136–145)
SODIUM UR-SCNC: 39 MMOL/L
SP GR UR STRIP.AUTO: 1.01 (ref 1–1.03)
STFR SERPL-SCNC: 4.3 MG/L (ref 1.9–4.4)
UA DIPSTICK W REFLEX MICRO PNL UR: YES
URN SPEC COLLECT METH UR: ABNORMAL
UROBILINOGEN UR STRIP-ACNC: 0.2 E.U./DL
WBC # BLD AUTO: 14.3 K/UL (ref 4–11)
WBC #/AREA URNS AUTO: 94 /HPF (ref 0–5)

## 2023-04-26 PROCEDURE — 6370000000 HC RX 637 (ALT 250 FOR IP): Performed by: NURSE PRACTITIONER

## 2023-04-26 PROCEDURE — C9113 INJ PANTOPRAZOLE SODIUM, VIA: HCPCS | Performed by: INTERNAL MEDICINE

## 2023-04-26 PROCEDURE — 36430 TRANSFUSION BLD/BLD COMPNT: CPT

## 2023-04-26 PROCEDURE — 83735 ASSAY OF MAGNESIUM: CPT

## 2023-04-26 PROCEDURE — 94760 N-INVAS EAR/PLS OXIMETRY 1: CPT

## 2023-04-26 PROCEDURE — 71045 X-RAY EXAM CHEST 1 VIEW: CPT

## 2023-04-26 PROCEDURE — 1200000000 HC SEMI PRIVATE

## 2023-04-26 PROCEDURE — 6370000000 HC RX 637 (ALT 250 FOR IP): Performed by: INTERNAL MEDICINE

## 2023-04-26 PROCEDURE — 6360000002 HC RX W HCPCS: Performed by: NURSE ANESTHETIST, CERTIFIED REGISTERED

## 2023-04-26 PROCEDURE — 97530 THERAPEUTIC ACTIVITIES: CPT

## 2023-04-26 PROCEDURE — 84156 ASSAY OF PROTEIN URINE: CPT

## 2023-04-26 PROCEDURE — 85025 COMPLETE CBC W/AUTO DIFF WBC: CPT

## 2023-04-26 PROCEDURE — 2580000003 HC RX 258: Performed by: INTERNAL MEDICINE

## 2023-04-26 PROCEDURE — 93005 ELECTROCARDIOGRAM TRACING: CPT | Performed by: NURSE PRACTITIONER

## 2023-04-26 PROCEDURE — 6360000002 HC RX W HCPCS: Performed by: INTERNAL MEDICINE

## 2023-04-26 PROCEDURE — 84100 ASSAY OF PHOSPHORUS: CPT

## 2023-04-26 PROCEDURE — 2700000000 HC OXYGEN THERAPY PER DAY

## 2023-04-26 PROCEDURE — 2709999900 HC NON-CHARGEABLE SUPPLY: Performed by: INTERNAL MEDICINE

## 2023-04-26 PROCEDURE — 2720000010 HC SURG SUPPLY STERILE: Performed by: INTERNAL MEDICINE

## 2023-04-26 PROCEDURE — 93010 ELECTROCARDIOGRAM REPORT: CPT | Performed by: INTERNAL MEDICINE

## 2023-04-26 PROCEDURE — 3700000001 HC ADD 15 MINUTES (ANESTHESIA): Performed by: INTERNAL MEDICINE

## 2023-04-26 PROCEDURE — 85018 HEMOGLOBIN: CPT

## 2023-04-26 PROCEDURE — 81001 URINALYSIS AUTO W/SCOPE: CPT

## 2023-04-26 PROCEDURE — 2060000000 HC ICU INTERMEDIATE R&B

## 2023-04-26 PROCEDURE — 7100000000 HC PACU RECOVERY - FIRST 15 MIN: Performed by: INTERNAL MEDICINE

## 2023-04-26 PROCEDURE — 94640 AIRWAY INHALATION TREATMENT: CPT

## 2023-04-26 PROCEDURE — 3609013000 HC EGD TRANSORAL CONTROL BLEEDING ANY METHOD: Performed by: INTERNAL MEDICINE

## 2023-04-26 PROCEDURE — 2580000003 HC RX 258: Performed by: NURSE ANESTHETIST, CERTIFIED REGISTERED

## 2023-04-26 PROCEDURE — 84300 ASSAY OF URINE SODIUM: CPT

## 2023-04-26 PROCEDURE — 3700000000 HC ANESTHESIA ATTENDED CARE: Performed by: INTERNAL MEDICINE

## 2023-04-26 PROCEDURE — 80053 COMPREHEN METABOLIC PANEL: CPT

## 2023-04-26 PROCEDURE — 82570 ASSAY OF URINE CREATININE: CPT

## 2023-04-26 PROCEDURE — 85014 HEMATOCRIT: CPT

## 2023-04-26 PROCEDURE — 7100000001 HC PACU RECOVERY - ADDTL 15 MIN: Performed by: INTERNAL MEDICINE

## 2023-04-26 PROCEDURE — 2500000003 HC RX 250 WO HCPCS: Performed by: INTERNAL MEDICINE

## 2023-04-26 PROCEDURE — 36415 COLL VENOUS BLD VENIPUNCTURE: CPT

## 2023-04-26 PROCEDURE — 99223 1ST HOSP IP/OBS HIGH 75: CPT | Performed by: NURSE PRACTITIONER

## 2023-04-26 PROCEDURE — 97166 OT EVAL MOD COMPLEX 45 MIN: CPT

## 2023-04-26 RX ORDER — DILTIAZEM HYDROCHLORIDE 120 MG/1
120 CAPSULE, COATED, EXTENDED RELEASE ORAL DAILY
Status: DISCONTINUED | OUTPATIENT
Start: 2023-04-26 | End: 2023-04-28 | Stop reason: HOSPADM

## 2023-04-26 RX ORDER — SODIUM CHLORIDE 9 MG/ML
INJECTION, SOLUTION INTRAVENOUS PRN
Status: DISCONTINUED | OUTPATIENT
Start: 2023-04-26 | End: 2023-04-28 | Stop reason: HOSPADM

## 2023-04-26 RX ORDER — RANOLAZINE 500 MG/1
500 TABLET, EXTENDED RELEASE ORAL 2 TIMES DAILY
Status: DISCONTINUED | OUTPATIENT
Start: 2023-04-26 | End: 2023-04-28 | Stop reason: HOSPADM

## 2023-04-26 RX ORDER — DIPHENHYDRAMINE HYDROCHLORIDE 50 MG/ML
12.5 INJECTION INTRAMUSCULAR; INTRAVENOUS
Status: DISCONTINUED | OUTPATIENT
Start: 2023-04-26 | End: 2023-04-26

## 2023-04-26 RX ORDER — SODIUM CHLORIDE 0.9 % (FLUSH) 0.9 %
5-40 SYRINGE (ML) INJECTION EVERY 12 HOURS SCHEDULED
Status: DISCONTINUED | OUTPATIENT
Start: 2023-04-26 | End: 2023-04-26

## 2023-04-26 RX ORDER — SODIUM CHLORIDE 0.9 % (FLUSH) 0.9 %
5-40 SYRINGE (ML) INJECTION PRN
Status: DISCONTINUED | OUTPATIENT
Start: 2023-04-26 | End: 2023-04-26

## 2023-04-26 RX ORDER — SODIUM CHLORIDE 9 MG/ML
INJECTION, SOLUTION INTRAVENOUS CONTINUOUS PRN
Status: DISCONTINUED | OUTPATIENT
Start: 2023-04-26 | End: 2023-04-26 | Stop reason: SDUPTHER

## 2023-04-26 RX ORDER — FUROSEMIDE 10 MG/ML
40 INJECTION INTRAMUSCULAR; INTRAVENOUS 2 TIMES DAILY
Status: DISCONTINUED | OUTPATIENT
Start: 2023-04-26 | End: 2023-04-27

## 2023-04-26 RX ORDER — SODIUM CHLORIDE 9 MG/ML
INJECTION, SOLUTION INTRAVENOUS PRN
Status: DISCONTINUED | OUTPATIENT
Start: 2023-04-26 | End: 2023-04-26

## 2023-04-26 RX ORDER — DIPHENHYDRAMINE HCL 25 MG
25 TABLET ORAL ONCE
Status: COMPLETED | OUTPATIENT
Start: 2023-04-26 | End: 2023-04-26

## 2023-04-26 RX ORDER — PROPOFOL 10 MG/ML
INJECTION, EMULSION INTRAVENOUS PRN
Status: DISCONTINUED | OUTPATIENT
Start: 2023-04-26 | End: 2023-04-26 | Stop reason: SDUPTHER

## 2023-04-26 RX ORDER — FUROSEMIDE 10 MG/ML
80 INJECTION INTRAMUSCULAR; INTRAVENOUS ONCE
Status: COMPLETED | OUTPATIENT
Start: 2023-04-26 | End: 2023-04-26

## 2023-04-26 RX ORDER — ONDANSETRON 2 MG/ML
4 INJECTION INTRAMUSCULAR; INTRAVENOUS
Status: DISCONTINUED | OUTPATIENT
Start: 2023-04-26 | End: 2023-04-26

## 2023-04-26 RX ADMIN — PROPOFOL 20 MG: 10 INJECTION, EMULSION INTRAVENOUS at 16:41

## 2023-04-26 RX ADMIN — MICONAZOLE NITRATE: 2 POWDER TOPICAL at 23:12

## 2023-04-26 RX ADMIN — TIOTROPIUM BROMIDE INHALATION SPRAY 2 PUFF: 3.12 SPRAY, METERED RESPIRATORY (INHALATION) at 08:55

## 2023-04-26 RX ADMIN — PROPOFOL 40 MG: 10 INJECTION, EMULSION INTRAVENOUS at 16:35

## 2023-04-26 RX ADMIN — IRON SUCROSE 200 MG: 20 INJECTION, SOLUTION INTRAVENOUS at 00:57

## 2023-04-26 RX ADMIN — RANOLAZINE 500 MG: 500 TABLET, FILM COATED, EXTENDED RELEASE ORAL at 08:34

## 2023-04-26 RX ADMIN — RANOLAZINE 500 MG: 500 TABLET, FILM COATED, EXTENDED RELEASE ORAL at 21:03

## 2023-04-26 RX ADMIN — DOFETILIDE 250 MCG: 0.25 CAPSULE ORAL at 21:03

## 2023-04-26 RX ADMIN — PROPOFOL 40 MG: 10 INJECTION, EMULSION INTRAVENOUS at 16:38

## 2023-04-26 RX ADMIN — SODIUM CHLORIDE: 9 INJECTION, SOLUTION INTRAVENOUS at 16:30

## 2023-04-26 RX ADMIN — ROSUVASTATIN CALCIUM 20 MG: 20 TABLET, FILM COATED ORAL at 08:35

## 2023-04-26 RX ADMIN — Medication 10 ML: at 08:39

## 2023-04-26 RX ADMIN — DOFETILIDE 250 MCG: 0.25 CAPSULE ORAL at 08:34

## 2023-04-26 RX ADMIN — SODIUM CHLORIDE: 9 INJECTION, SOLUTION INTRAVENOUS at 02:44

## 2023-04-26 RX ADMIN — Medication 10 ML: at 21:06

## 2023-04-26 RX ADMIN — FUROSEMIDE 80 MG: 10 INJECTION, SOLUTION INTRAMUSCULAR; INTRAVENOUS at 10:08

## 2023-04-26 RX ADMIN — HYDRALAZINE HYDROCHLORIDE 50 MG: 50 TABLET, FILM COATED ORAL at 21:05

## 2023-04-26 RX ADMIN — MOMETASONE FUROATE AND FORMOTEROL FUMARATE DIHYDRATE 2 PUFF: 200; 5 AEROSOL RESPIRATORY (INHALATION) at 08:55

## 2023-04-26 RX ADMIN — MOMETASONE FUROATE AND FORMOTEROL FUMARATE DIHYDRATE 2 PUFF: 200; 5 AEROSOL RESPIRATORY (INHALATION) at 19:09

## 2023-04-26 RX ADMIN — SODIUM CHLORIDE 8 MG/HR: 9 INJECTION, SOLUTION INTRAVENOUS at 08:39

## 2023-04-26 RX ADMIN — DIPHENHYDRAMINE HCL 25 MG: 25 TABLET ORAL at 05:13

## 2023-04-26 RX ADMIN — HYDRALAZINE HYDROCHLORIDE 50 MG: 50 TABLET, FILM COATED ORAL at 05:13

## 2023-04-26 RX ADMIN — SODIUM CHLORIDE 8 MG/HR: 9 INJECTION, SOLUTION INTRAVENOUS at 21:02

## 2023-04-26 RX ADMIN — ISOSORBIDE MONONITRATE 120 MG: 60 TABLET, EXTENDED RELEASE ORAL at 08:35

## 2023-04-26 RX ADMIN — DILTIAZEM HYDROCHLORIDE 120 MG: 120 CAPSULE, EXTENDED RELEASE ORAL at 17:26

## 2023-04-26 RX ADMIN — IRON SUCROSE 200 MG: 20 INJECTION, SOLUTION INTRAVENOUS at 21:05

## 2023-04-26 RX ADMIN — FUROSEMIDE 40 MG: 10 INJECTION, SOLUTION INTRAMUSCULAR; INTRAVENOUS at 17:26

## 2023-04-26 ASSESSMENT — PAIN SCALES - GENERAL
PAINLEVEL_OUTOF10: 0

## 2023-04-26 ASSESSMENT — ENCOUNTER SYMPTOMS
DIARRHEA: 0
SHORTNESS OF BREATH: 1
NAUSEA: 0
BLOOD IN STOOL: 0
SHORTNESS OF BREATH: 1

## 2023-04-26 ASSESSMENT — COPD QUESTIONNAIRES: CAT_SEVERITY: SEVERE

## 2023-04-26 NOTE — CONSULTS
[address] [place] [address] Nephrology   [facility]  [phone]  Nephrology Consult Note          Patient ID: Adeline Martinez  Referring/ Physician: Reilly Anna MD      HPI/Summary:   Adeline Martinez is being seen by nephrology for MARK on CKD. This is a 78-year-old lady with past medical history significant for COPD on chronic home oxygen of 4 L, atrial fibrillation, CAD, CHF, hypertension who presented the hospital with anemia found on her outpatient labs. Patient was seen and examined at bedside with her  present. The patient is very somnolent and lethargic but able to provide some history. She has been eating and drinking like her normal self. She has not vomited any blood. She has not had any melanotic stools but she did have some bright red blood in her stool a few days ago. She had been constipated for days leading up to that. She has been having epistaxis as well. She has dyspnea on exertion. She has not had any chest pain no vomiting no nausea no abdominal pain. According to her  her diuretics been changed multiple times as an outpatient lately. At 1 point she had been on metolazone 3 times a week which was then reduced to twice a week. She was also changed from Lasix to torsemide. She has not use any NSAIDs at home. She has not had any hematuria or foamy frothy urine. She does have some mild lower extremity edema but her  says it is actually better than what it has been. Of note she did not have elevated JVD. Blood pressure 125/52  Heart rate 63  Afebrile  She satting 99% on 4 L  Her chest x-ray did not show pulmonary edema overtly. There is no urine output recorded, she does not have a Linder    Sodium 139 potassium 4.2 bicarb 31 BUN 81 creatinine 2 calcium 8.9  Hemoglobin 6.8 leukocytosis with a white blood cell count of 17 and a platelet count of 754      Plan:   - seems this MARK is hemodynamic mediated.  She had been on higher doses of diuretics when her cr
Cardiac Electrophysiology Consultation     Date: 4/26/2023  Admit Date:  4/24/2023  Admission Diagnosis: Shortness of breath [R06.02]  Acute blood loss anemia [D62]  Gastrointestinal hemorrhage with melena [K92.1]  Anemia, unspecified type [D64.9]  Chronic kidney disease, unspecified CKD stage [N18.9]     Reason for Consultation: on dofetilide with CKD, persistent atrial fibrillation  Consult Requesting Physician: Talat Dufyf MD       History of Present Illness  Jerome Mendez is a 69y.o. year old female with past medical history significant for persistent atrial fibrillation, AAA, CAD, HTN, COPD, PE, DVT and diastolic HF s/p CardioMems. She underwent EP study with radiofrequency ablation of atrial fibrillation and pulmonary vein isolation, additional ablation with creation of a roof line (for L flutter) and anterior line from mitral annulus to the roof (for L flutter) on 10/7/20 with Dr. Rylie Guerrero. She had recurrent A fib when seen in the office for follow up in January 2021. She underwent successful DC cardioversion on 1/12/21. She then underwent EP study with RFA of left atrial flutter with pulmonary vein isolation, roof line ablation (for L flutter), anterior wall (L flutter), appendage ablation (for L flutter) and low anterior wall ablation (for L flutter) on 2/17/21 with Dr. Rylie Guerrero. She had recurrent atrial flutter when seen in the office on 1/25/22. She underwent atrial fibrillation (PVI, CAFE), left atrial flutter (roof line) and left atrial tachycardia ablation on 2/4/22 with Dr. Rylie Guerrero. She was resumed on flecaindie 50mg BID and Toprol was decreased to 25mg QD. She was back in atrial fibrillation when seen by Polo Phelan NP on 3/21/22. She was seen in the office last week and remained in atrial fibrillation. She did not want to schedule any procedures at that time and instead wanted to increase flecainide to 100mg BID. She remained in atrial fibrillation when seen in the office on 4/12/22.  She underwent
PALLIATIVE MEDICINE CONSULTATION     Patient name:Janae Sheppard   KFT:[id number]    :1945  Room/Bed:S2L-6915/5280-01   LOS: 2 days         Date of consult:2023    Consult Information  Palliative Medicine Consult performed by: CHRISTINE Marcus CNP     Inpatient consult to Palliative Care  Consult performed by: CHRISTINE Marcus CNP  Consult ordered by: Aleja Torres MD  Reason for consult: Recurrent admission, CHF. ASSESSMENT/RECOMMENDATIONS     69 y.o. female with acute GI bleed, chronic diastolic HF, and debility. Symptom Management:  Acute GI bleed - GI following, plan for EGD today. Blood products per internal medicine. Diastolic CHF - Continues to have admissions secondary to exacerbation. Cardiology adjusting medications due to side effects including bradycardia. Educated patient and  on disease process as well as medications and side effects. Unable to tolerate therapy at this time due to CKD. Discussed high risk for readmission which is going against patients goals of care. Debility - Patient is unable to complete ADL's, however SNF is not an option. PT/OT active, would benefit from SNF vs home care. Goals of Care - Discussed with patient and  at bedside. Patients goal remains to be in her home under the care of her , SNF/ECF is not an option. Mrs. Ady Sheppard has expressed throughout several admissions that she does not wish to return to the hospital.  She struggles with outpatient follow up, has missed several appointments. Her focus would be to remain in the home with her  and avoid outpatient visits as well as hospitalizations. She does not wish to pursue any aggressive medical management that would inhibit her from living in her home. She does not wish to remain on long term life support. Her and her  are open to acute measures, however only if they are temporary in nature.   We discussed code status at
The NP's [facility], EP NP) documentation has been prepared under my direction and personally reviewed by me in its entirety. I confirm that the consultation note created by the NP accurately reflects all work, physical examination, the discussion of treatments and procedures, and medical decision making by the NP. In addition, I have personally met with; performed a physical examination on; discussed the diagnosis (-es), treatment options including procedures, and formulated medical decisions for this patient. In brief, Kamilah Rice 69 y.o. female h/o persistent Afib for which she underwent multiple afib ablations and yet still recurring with atrial fibrillation presents with dyspnea and fatigue and is diagnosed with anemia from probable GIB. Incidentally found to be afib with v-rates mid-40's bpm asymptomatic. Will hold off on metoprolol but will re-start Diltiazem as afib now has v-rates of  bpm. Will continue Ranexa and Dofetilide, the latter attempting rhythm-control. May need outpatient AKYE/CV to restore sinus rhythm only after able to resume DOAC. Please refer to the NP's consult note for full details on the assessment and plan. Thank you for allowing us to participate in the care of your patient. If you have any questions, please do not hesitate to contact us.      Colonel Erich MD, MS, [facility]  Cardiac Electrophysiology  [address]  [address] [facility], [address] [facility] [place] [address]  [phone]
tobacco: Former    Tobacco comments:     E cigarette now and then   Vaping Use    Vaping Use: Never used   Substance Use Topics    Alcohol use: No    Drug use: No      Allergies   Allergen Reactions    Morphine Rash     rash      mometasone-formoterol  2 puff Inhalation BID    tiotropium  2 puff Inhalation Daily    rosuvastatin  20 mg Oral Daily    hydrALAZINE  50 mg Oral 3 times per day    isosorbide mononitrate  120 mg Oral Daily    dofetilide  250 mcg Oral 2 times per day    [Held by provider] clopidogrel  75 mg Oral Daily    sodium chloride flush  10 mL IntraVENous 2 times per day       Review of Systems -   Constitutional: Negative for weight gain/loss; malaise, fever  Respiratory: Negative for Asthma;  cough and hemoptysis  Cardiovascular: Negative for palpitations,dizziness   Gastrointestinal: Negative for abd.pain; constipation/diarrhea;    Genitourinary: Negative for stones; hematuria; frequency hesitancy  Integumentt: Negative for rash or pruritis  Hematologic/lymphatic: Negative for blood dyscrasia; leukemia/lymphoma  Musculoskeletal: Negative for Connective tissue disease  Neurological:  Negative for Seizure   Behavioral/Psych:Negative for Bipolar disorder, Schizophrenia; Dementia  Endocrine: negative for thyroid, parathyroid disease      Intake/Output Summary (Last 24 hours) at 4/25/2023 1656  Last data filed at 4/25/2023 1423  Gross per 24 hour   Intake 622.75 ml   Output --   Net 622.75 ml       Physical Examination:    BP (!) 144/65   Pulse 78   Temp 98.1 °F (36.7 °C)   Resp 16   Ht 5' 4\" (1.626 m)   Wt 237 lb 3.4 oz (107.6 kg)   SpO2 93%   BMI 40.72 kg/m²    HEENT:  Face: Atraumatic, Conjunctiva: Pink; non icteric,  Mucous Memb:  Moist, No thyromegaly or Lymphadenopathy  Respiratory:  Resp Assessment: normal, Resp Auscultation: clear   Cardiovascular: Auscultation: nl S1 & S2, Palpation:  Nl PMI;  No heaves or thrills, JVP:  normal  Abdomen: Soft, non-tender, Normal bowel sounds,  No
Transportation (Non-Medical): No   Housing Stability: Unknown    Unstable Housing in the Last Year: No       MEDICATIONS   SCHEDULED:  mometasone-formoterol, 2 puff, BID  tiotropium, 2 puff, Daily  rosuvastatin, 20 mg, Daily  hydrALAZINE, 50 mg, 3 times per day  isosorbide mononitrate, 120 mg, Daily  dofetilide, 250 mcg, 2 times per day  [Held by provider] clopidogrel, 75 mg, Daily  sodium chloride flush, 10 mL, 2 times per day      FLUIDS/DRIPS:     sodium chloride      sodium chloride      pantoprazole 8 mg/hr (04/24/23 2018)    sodium chloride      sodium chloride 75 mL/hr at 04/25/23 7156     PRNs: sodium chloride, , PRN  sodium chloride, , PRN  albuterol, 2.5 mg, Q4H PRN  sodium chloride flush, 10 mL, PRN  sodium chloride, , PRN  ondansetron, 4 mg, Q4H PRN  polyethylene glycol, 17 g, Daily PRN  acetaminophen, 650 mg, Q4H PRN   Or  acetaminophen, 650 mg, Q4H PRN      ALLERGIES:  She   Allergies   Allergen Reactions    Morphine Rash     rash       REVIEW OF SYSTEMS   Pertinent ROS noted in HPI    PHYSICAL EXAM     Vitals:    04/24/23 2210 04/24/23 2330 04/25/23 0455 04/25/23 0730   BP: (!) 158/45 (!) 155/64 (!) 150/67 (!) 148/73   Pulse: 59 62 74 74   Resp: 26 19 19 24   Temp: 98.5 °F (36.9 °C) 98.1 °F (36.7 °C) 98.3 °F (36.8 °C) 97.9 °F (36.6 °C)   TempSrc: Oral Oral Oral Oral   SpO2: 100% 100% 100% 100%   Weight:   237 lb 3.4 oz (107.6 kg)    Height:           I/O last 3 completed shifts: In: 358.8 [Blood:358.8]  Out: -       Physical Exam:  General appearance: alert, cooperative, no distress  Eyes: Anicteric  Head: Normocephalic, without obvious abnormality  Lungs: clear to auscultation bilaterally, Normal Effort  Heart: regular rate and rhythm  Abdomen: soft, non-distended, non-tender. Bowel sounds normal. No masses,  no organomegaly. Extremities: atraumatic, no cyanosis.   2+BLE edema  Skin: warm and dry, no jaundice  Neuro: Grossly intact, A&OX3      LABS AND IMAGING   Laboratory   Recent Labs

## 2023-04-26 NOTE — ANESTHESIA POSTPROCEDURE EVALUATION
Department of Anesthesiology  Postprocedure Note    Patient: Theopolis Osler  MRN: [id number]  YOB: 1945  Date of evaluation: 4/26/2023      Procedure Summary     Date: 04/26/23 Room / Location: [address] [place] [address]    Anesthesia Start: 1631 Anesthesia Stop: 6664    Procedure: EGD CONTROL HEMORRHAGE WITH APC TO AVM Diagnosis:       Anemia, unspecified type      (Anemia, unspecified type [D64.9])    Surgeons: Hannah Woods MD Responsible Provider: Kamron Perez MD    Anesthesia Type: General ASA Status: 4          Anesthesia Type: General    Mode Phase I: Mode Score: 9    Mode Phase II:        Anesthesia Post Evaluation    Patient location during evaluation: bedside  Patient participation: complete - patient participated  Level of consciousness: awake and alert  Pain score: 0  Nausea & Vomiting: no nausea  Complications: no  Cardiovascular status: hemodynamically stable  Respiratory status: acceptable  Hydration status: stable

## 2023-04-26 NOTE — H&P
Pre-operative History and Physical    Patient: Jasper Michaud  : 1945  Acct#:     Intended Procedure:  EGD    HISTORY OF PRESENT ILLNESS:  The patient is a 69 y.o. female  who presents for EGD due to anemia with episodic melena. Past Medical History:        Diagnosis Date    AAA (abdominal aortic aneurysm) ([facility]     pt states it is 4cm    AAA (abdominal aortic aneurysm) without rupture (HCC) 2/10/2015    Atrial fibrillation (HCC)     CAD (coronary artery disease)     CHF (congestive heart failure) (HCC)     COPD (chronic obstructive pulmonary disease) ([facility] [address]     History of blood clots     Hyperlipidemia     Hypertension      Past Surgical History:        Procedure Laterality Date    ABDOMINAL AORTIC ANEURYSM REPAIR      Endovascular abdominal AA    APPENDECTOMY      incidental    BLADDER SUSPENSION      CARDIAC CATHETERIZATION Right 2022    Cardiomems PA sensor device implant Left PA    CARDIAC SURGERY      CATARACT REMOVAL      CHOLECYSTECTOMY  10/15/2013    COLONOSCOPY  2007    dr Lopez Mock and check in 5 years. COLONOSCOPY  2017    ok dr arndt, repeat 5 years    HYSTERECTOMY ([address])      for benign tumor. just the uterus    JOINT REPLACEMENT  2013    right knee replacement    TONSILLECTOMY  as a child    TUMOR EXCISION      benign behind right ear about      Medications Prior to Admission:   No current facility-administered medications on file prior to encounter.      Current Outpatient Medications on File Prior to Encounter   Medication Sig Dispense Refill    metOLazone (ZAROXOLYN) 5 MG tablet 2.5 mg (1/2 tablet) on M,TH 30 minutes prior to torsemide dose---HOLD  (Patient not taking: Reported on 2023) 10 tablet 4    spironolactone (ALDACTONE) 25 MG tablet Take 1 tablet by mouth daily --HOLD  30 tablet 3    Torsemide 40 MG TABS Take 40 mg by mouth daily ---- TAKE as needed 23 30 tablet 3    ranolazine (RANEXA) 500 MG extended
tiotropium (SPIRIVA RESPIMAT) 2.5 MCG/ACT AERS inhaler Inhale 2 puffs into the lungs daily 3/29/23   Rosalba Saxena MD   metoprolol succinate (TOPROL XL) 25 MG extended release tablet Take 1 one tab in am and 1/2 tab in evening 3/28/23   Rosalba Saxena MD   clobetasol (TEMOVATE) 0.05 % cream Apply 1 Dose topically daily as needed 3/10/23   Historical Provider, MD   fluticasone-umeclidin-vilant (TRELEGY ELLIPTA) 200-62.5-25 MCG/ACT AEPB inhaler Inhale 1 puff into the lungs daily 2/21/23   Meet August DO   clopidogrel (PLAVIX) 75 MG tablet TAKE ONE TABLET BY MOUTH DAILY 12/19/22   Mame Moralez MD   rosuvastatin (CRESTOR) 20 MG tablet Take 1 tablet by mouth daily 12/12/22   CHRISTINE Christianson CNP   dilTIAZem (CARDIZEM CD) 120 MG extended release capsule TAKE ONE CAPSULE BY MOUTH EVERY MORNING AND TAKE ONE CAPSULE BY MOUTH EVERY NIGHT AT BEDTIME 12/12/22   CHRISTINE Christianson CNP   hydrALAZINE (APRESOLINE) 50 MG tablet TAKE ONE TABLET BY MOUTH EVERY 8 HOURS 12/12/22   CHRISTINE Christianson CNP   rivaroxaban (XARELTO) 20 MG TABS tablet TAKE ONE TABLET BY MOUTH DAILY WITH BREAKFAST 12/9/22   Jasmin Solano MD   dofetilide (TIKOSYN) 250 MCG capsule TAKE ONE CAPSULE BY MOUTH EVERY 12 HOURS 11/25/22   Wilfred Grady MD   albuterol (PROVENTIL) (2.5 MG/3ML) 0.083% nebulizer solution Take 3 mLs by nebulization every 4 hours as needed for Wheezing    Historical Provider, MD   vitamin C (ASCORBIC ACID) 500 MG tablet Take 1 tablet by mouth daily    Historical Provider, MD   Multiple Vitamins-Minerals (MULTI FOR HER 50+ PO) Take 1 tablet by mouth daily    Historical Provider, MD       Family History:       Problem Relation Age of Onset    Heart Disease Father     Hypertension Other      Social History:   TOBACCO:   reports that she quit smoking about 9 years ago. Her smoking use included cigarettes. She started smoking about 46 years ago. She has a 37.00 pack-year smoking history.  She has been exposed

## 2023-04-26 NOTE — ANESTHESIA PRE PROCEDURE
Department of Anesthesiology  Preprocedure Note       Name:  Abby Lei   Age:  69 y.o.  :  1945                                          MRN:  [id number]         Date:  2023      Surgeon: Reed Anthony):  Dee Aguila MD    Procedure: Procedure(s):  ESOPHAGOGASTRODUODENOSCOPY    Medications prior to admission:   Prior to Admission medications    Medication Sig Start Date End Date Taking?  Authorizing Provider   metOLazone (ZAROXOLYN) 5 MG tablet 2.5 mg (1/2 tablet) on M,TH 30 minutes prior to torsemide dose---HOLD   Patient not taking: Reported on 2023   Alexa Mcclure MD   spironolactone (ALDACTONE) 25 MG tablet Take 1 tablet by mouth daily --HOLD 23   Dejuan Wolf MD   Torsemide 40 MG TABS Take 40 mg by mouth daily ---- TAKE as needed 23   Alexa Mcclure MD   ranolazine (RANEXA) 500 MG extended release tablet Take 1 tablet by mouth 2 times daily 23   Alexa Mcclure MD   ferrous sulfate (IRON 325) 325 (65 Fe) MG tablet Take 1 tablet by mouth 2 times daily 23   Alexa Mcclure MD   isosorbide mononitrate (IMDUR) 120 MG extended release tablet Take 1 tablet by mouth daily 3/29/23   Belkys Robles MD   tiotropium (SPIRIVA RESPIMAT) 2.5 MCG/ACT AERS inhaler Inhale 2 puffs into the lungs daily 3/29/23   Belkys Robles MD   metoprolol succinate (TOPROL XL) 25 MG extended release tablet Take 1 one tab in am and 1/2 tab in evening 3/28/23   Belkys Robles MD   clobetasol (TEMOVATE) 0.05 % cream Apply 1 Dose topically daily as needed 3/10/23   Historical Provider, MD   fluticasone-umeclidin-vilant (TRELEGY ELLIPTA) 200-62.5-25 MCG/ACT AEPB inhaler Inhale 1 puff into the lungs daily 23   Jonna Nguyễn DO   clopidogrel (PLAVIX) 75 MG tablet TAKE ONE TABLET BY MOUTH DAILY 22   Radha Gilbert MD   rosuvastatin (CRESTOR) 20 MG tablet Take 1 tablet by mouth daily 22   CHRISTINE Stewart - CNP   dilTIAZem (CARDIZEM CD) 120 MG
8.4 04/26/2023 06:35 AM    BILITOT <0.2 04/26/2023 06:35 AM    ALKPHOS 49 04/26/2023 06:35 AM    AST 10 04/26/2023 06:35 AM    ALT 8 04/26/2023 06:35 AM       POC Tests:   Recent Labs     04/26/23  1509   POCGLU 108*       Coags:   Lab Results   Component Value Date/Time    PROTIME 30.8 04/24/2023 05:43 PM    INR 2.98 04/24/2023 05:43 PM    APTT 37.2 02/15/2020 06:28 PM       HCG (If Applicable): No results found for: PREGTESTUR, PREGSERUM, HCG, HCGQUANT     ABGs:   Lab Results   Component Value Date/Time    PHART 7.454 06/24/2022 03:45 PM    PO2ART 72.2 06/24/2022 03:45 PM    QYX6GWL 45.1 06/24/2022 03:45 PM    LOS7TEZ 31.6 06/24/2022 03:45 PM    BEART 6.9 06/24/2022 03:45 PM    N5BZLTYD 96.0 06/24/2022 03:45 PM        Type & Screen (If Applicable):  No results found for: LABABO, LABRH    Drug/Infectious Status (If Applicable):  No results found for: HIV, HEPCAB    COVID-19 Screening (If Applicable):   Lab Results   Component Value Date/Time    COVID19 Not Detected 11/02/2022 07:29 PM    COVID19 NOT DETECTED 10/05/2020 02:52 PM           Anesthesia Evaluation  Patient summary reviewed no history of anesthetic complications:   Airway: Mallampati: II  TM distance: >3 FB   Neck ROM: full  Mouth opening: > = 3 FB   Dental:          Pulmonary:   (+) COPD (4 L O2 baseline): severe,  shortness of breath:  sleep apnea:                             Cardiovascular:    (+) hypertension:, CAD:, dysrhythmias: atrial fibrillation, CHF: diastolic, LI:, hyperlipidemia      ECG reviewed      Echocardiogram reviewed  Stress test reviewed             ROS comment: [facility] 2022:  Cath: 9/20/22 [place]     Findings:  Left Main  Distal non obstructive 30% disease, evaluated by IVUS MLA > 7.5 mm2  LAD  Moderate diffuse disease  Circ  Proximal vessel 75-80% stenosis correlating with positive functional study   Mid vessel non-obstructive disease 50%  RCA   with L-R collaterals       Interventions/Vessels  PCI performed to the proximal Lcx
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
## 2023-04-27 ENCOUNTER — APPOINTMENT (OUTPATIENT)
Dept: GENERAL RADIOLOGY | Age: 78
End: 2023-04-27
Payer: MEDICARE

## 2023-04-27 LAB
ALBUMIN SERPL-MCNC: 3.2 G/DL (ref 3.4–5)
ALBUMIN/GLOB SERPL: 1.3 {RATIO} (ref 1.1–2.2)
ALP SERPL-CCNC: 47 U/L (ref 40–129)
ALT SERPL-CCNC: 9 U/L (ref 10–40)
ANION GAP SERPL CALCULATED.3IONS-SCNC: 9 MMOL/L (ref 3–16)
AST SERPL-CCNC: 14 U/L (ref 15–37)
BASOPHILS # BLD: 0 K/UL (ref 0–0.2)
BASOPHILS NFR BLD: 0.3 %
BILIRUB SERPL-MCNC: 0.4 MG/DL (ref 0–1)
BUN SERPL-MCNC: 51 MG/DL (ref 7–20)
CALCIUM SERPL-MCNC: 8.4 MG/DL (ref 8.3–10.6)
CHLORIDE SERPL-SCNC: 99 MMOL/L (ref 99–110)
CO2 SERPL-SCNC: 32 MMOL/L (ref 21–32)
CREAT SERPL-MCNC: 1.8 MG/DL (ref 0.6–1.2)
DEPRECATED RDW RBC AUTO: 19.7 % (ref 12.4–15.4)
EOSINOPHIL # BLD: 0.4 K/UL (ref 0–0.6)
EOSINOPHIL NFR BLD: 2.9 %
GFR SERPLBLD CREATININE-BSD FMLA CKD-EPI: 29 ML/MIN/{1.73_M2}
GLUCOSE BLD-MCNC: 185 MG/DL (ref 70–99)
GLUCOSE SERPL-MCNC: 96 MG/DL (ref 70–99)
HCT VFR BLD AUTO: 24.9 % (ref 36–48)
HGB BLD-MCNC: 8.1 G/DL (ref 12–16)
LYMPHOCYTES # BLD: 0.8 K/UL (ref 1–5.1)
LYMPHOCYTES NFR BLD: 5 %
MAGNESIUM SERPL-MCNC: 1.9 MG/DL (ref 1.8–2.4)
MCH RBC QN AUTO: 27.7 PG (ref 26–34)
MCHC RBC AUTO-ENTMCNC: 32.7 G/DL (ref 31–36)
MCV RBC AUTO: 84.5 FL (ref 80–100)
MONOCYTES # BLD: 0.9 K/UL (ref 0–1.3)
MONOCYTES NFR BLD: 6.1 %
NEUTROPHILS # BLD: 13 K/UL (ref 1.7–7.7)
NEUTROPHILS NFR BLD: 85.7 %
PERFORMED ON: ABNORMAL
PHOSPHATE SERPL-MCNC: 3.5 MG/DL (ref 2.5–4.9)
PLATELET # BLD AUTO: 227 K/UL (ref 135–450)
PMV BLD AUTO: 8.8 FL (ref 5–10.5)
POTASSIUM SERPL-SCNC: 3.7 MMOL/L (ref 3.5–5.1)
PROT SERPL-MCNC: 5.6 G/DL (ref 6.4–8.2)
RBC # BLD AUTO: 2.94 M/UL (ref 4–5.2)
SODIUM SERPL-SCNC: 140 MMOL/L (ref 136–145)
WBC # BLD AUTO: 15.2 K/UL (ref 4–11)

## 2023-04-27 PROCEDURE — 85025 COMPLETE CBC W/AUTO DIFF WBC: CPT

## 2023-04-27 PROCEDURE — 84100 ASSAY OF PHOSPHORUS: CPT

## 2023-04-27 PROCEDURE — 6370000000 HC RX 637 (ALT 250 FOR IP): Performed by: INTERNAL MEDICINE

## 2023-04-27 PROCEDURE — 2580000003 HC RX 258: Performed by: INTERNAL MEDICINE

## 2023-04-27 PROCEDURE — 36415 COLL VENOUS BLD VENIPUNCTURE: CPT

## 2023-04-27 PROCEDURE — 6360000002 HC RX W HCPCS: Performed by: INTERNAL MEDICINE

## 2023-04-27 PROCEDURE — 94760 N-INVAS EAR/PLS OXIMETRY 1: CPT

## 2023-04-27 PROCEDURE — 94640 AIRWAY INHALATION TREATMENT: CPT

## 2023-04-27 PROCEDURE — 97530 THERAPEUTIC ACTIVITIES: CPT

## 2023-04-27 PROCEDURE — 80053 COMPREHEN METABOLIC PANEL: CPT

## 2023-04-27 PROCEDURE — 2700000000 HC OXYGEN THERAPY PER DAY

## 2023-04-27 PROCEDURE — 97535 SELF CARE MNGMENT TRAINING: CPT

## 2023-04-27 PROCEDURE — 6370000000 HC RX 637 (ALT 250 FOR IP): Performed by: PHYSICIAN ASSISTANT

## 2023-04-27 PROCEDURE — 71045 X-RAY EXAM CHEST 1 VIEW: CPT

## 2023-04-27 PROCEDURE — 1200000000 HC SEMI PRIVATE

## 2023-04-27 PROCEDURE — 99232 SBSQ HOSP IP/OBS MODERATE 35: CPT | Performed by: NURSE PRACTITIONER

## 2023-04-27 PROCEDURE — 97116 GAIT TRAINING THERAPY: CPT

## 2023-04-27 PROCEDURE — C9113 INJ PANTOPRAZOLE SODIUM, VIA: HCPCS | Performed by: INTERNAL MEDICINE

## 2023-04-27 PROCEDURE — 83735 ASSAY OF MAGNESIUM: CPT

## 2023-04-27 RX ORDER — TORSEMIDE 20 MG/1
20 TABLET ORAL DAILY
Status: DISCONTINUED | OUTPATIENT
Start: 2023-04-28 | End: 2023-04-28 | Stop reason: HOSPADM

## 2023-04-27 RX ORDER — PANTOPRAZOLE SODIUM 40 MG/1
40 TABLET, DELAYED RELEASE ORAL
Status: DISCONTINUED | OUTPATIENT
Start: 2023-04-27 | End: 2023-04-28 | Stop reason: HOSPADM

## 2023-04-27 RX ORDER — DILTIAZEM HYDROCHLORIDE 120 MG/1
120 CAPSULE, COATED, EXTENDED RELEASE ORAL DAILY
Qty: 30 CAPSULE | Refills: 3 | Status: SHIPPED
Start: 2023-04-28

## 2023-04-27 RX ORDER — TORSEMIDE 20 MG/1
20 TABLET ORAL DAILY
Qty: 30 TABLET | Refills: 3 | Status: SHIPPED
Start: 2023-04-28 | End: 2023-05-05

## 2023-04-27 RX ORDER — DIPHENHYDRAMINE HCL 25 MG
25 TABLET ORAL EVERY 6 HOURS PRN
Status: DISCONTINUED | OUTPATIENT
Start: 2023-04-27 | End: 2023-04-28 | Stop reason: HOSPADM

## 2023-04-27 RX ADMIN — IRON SUCROSE 200 MG: 20 INJECTION, SOLUTION INTRAVENOUS at 22:40

## 2023-04-27 RX ADMIN — MOMETASONE FUROATE AND FORMOTEROL FUMARATE DIHYDRATE 2 PUFF: 200; 5 AEROSOL RESPIRATORY (INHALATION) at 19:58

## 2023-04-27 RX ADMIN — MICONAZOLE NITRATE: 2 POWDER TOPICAL at 10:19

## 2023-04-27 RX ADMIN — PANTOPRAZOLE SODIUM 40 MG: 40 TABLET, DELAYED RELEASE ORAL at 10:19

## 2023-04-27 RX ADMIN — HYDRALAZINE HYDROCHLORIDE 50 MG: 50 TABLET, FILM COATED ORAL at 06:24

## 2023-04-27 RX ADMIN — DILTIAZEM HYDROCHLORIDE 120 MG: 120 CAPSULE, EXTENDED RELEASE ORAL at 10:19

## 2023-04-27 RX ADMIN — RANOLAZINE 500 MG: 500 TABLET, FILM COATED, EXTENDED RELEASE ORAL at 20:13

## 2023-04-27 RX ADMIN — ROSUVASTATIN CALCIUM 20 MG: 20 TABLET, FILM COATED ORAL at 10:19

## 2023-04-27 RX ADMIN — DOFETILIDE 250 MCG: 0.25 CAPSULE ORAL at 10:19

## 2023-04-27 RX ADMIN — SODIUM CHLORIDE 8 MG/HR: 9 INJECTION, SOLUTION INTRAVENOUS at 06:28

## 2023-04-27 RX ADMIN — DOFETILIDE 250 MCG: 0.25 CAPSULE ORAL at 20:12

## 2023-04-27 RX ADMIN — RIVAROXABAN 20 MG: 20 TABLET, FILM COATED ORAL at 10:19

## 2023-04-27 RX ADMIN — HYDRALAZINE HYDROCHLORIDE 50 MG: 50 TABLET, FILM COATED ORAL at 21:46

## 2023-04-27 RX ADMIN — MICONAZOLE NITRATE: 2 POWDER TOPICAL at 20:12

## 2023-04-27 RX ADMIN — Medication 10 ML: at 10:21

## 2023-04-27 RX ADMIN — TIOTROPIUM BROMIDE INHALATION SPRAY 2 PUFF: 3.12 SPRAY, METERED RESPIRATORY (INHALATION) at 09:01

## 2023-04-27 RX ADMIN — MOMETASONE FUROATE AND FORMOTEROL FUMARATE DIHYDRATE 2 PUFF: 200; 5 AEROSOL RESPIRATORY (INHALATION) at 09:01

## 2023-04-27 RX ADMIN — ISOSORBIDE MONONITRATE 120 MG: 60 TABLET, EXTENDED RELEASE ORAL at 10:19

## 2023-04-27 RX ADMIN — DIPHENHYDRAMINE HCL 25 MG: 25 TABLET ORAL at 13:03

## 2023-04-27 RX ADMIN — RANOLAZINE 500 MG: 500 TABLET, FILM COATED, EXTENDED RELEASE ORAL at 10:19

## 2023-04-27 RX ADMIN — HYDRALAZINE HYDROCHLORIDE 50 MG: 50 TABLET, FILM COATED ORAL at 13:03

## 2023-04-27 RX ADMIN — Medication 10 ML: at 20:12

## 2023-04-27 RX ADMIN — FUROSEMIDE 40 MG: 10 INJECTION, SOLUTION INTRAMUSCULAR; INTRAVENOUS at 10:19

## 2023-04-27 ASSESSMENT — PAIN SCALES - GENERAL: PAINLEVEL_OUTOF10: 0

## 2023-04-27 ASSESSMENT — ENCOUNTER SYMPTOMS
SHORTNESS OF BREATH: 0
GASTROINTESTINAL NEGATIVE: 1

## 2023-04-27 NOTE — CARE COORDINATION
Discharge Planning: This RN CM received call from Luis, Palliative Care RN informing of patient's interest in [facility]'s ([address] [facility]) advanced cardiac program. [address] referral placed via Epic. I spoke with Eva Damon, [address] [place], [place] [address]ve [facility] RN Liaison, who is informed of the referral and will be following up with patient. Will continue to follow for updates.     Electronically signed by Shirley Torres RN on 4/27/2023 at 1:31 PM

## 2023-04-28 VITALS
HEIGHT: 64 IN | HEART RATE: 80 BPM | SYSTOLIC BLOOD PRESSURE: 165 MMHG | TEMPERATURE: 98.2 F | WEIGHT: 240.74 LBS | RESPIRATION RATE: 18 BRPM | OXYGEN SATURATION: 99 % | DIASTOLIC BLOOD PRESSURE: 56 MMHG | BODY MASS INDEX: 41.1 KG/M2

## 2023-04-28 LAB
ALBUMIN SERPL-MCNC: 3.2 G/DL (ref 3.4–5)
ALBUMIN/GLOB SERPL: 1.5 {RATIO} (ref 1.1–2.2)
ALP SERPL-CCNC: 45 U/L (ref 40–129)
ALT SERPL-CCNC: 9 U/L (ref 10–40)
ANION GAP SERPL CALCULATED.3IONS-SCNC: 10 MMOL/L (ref 3–16)
AST SERPL-CCNC: 13 U/L (ref 15–37)
BASOPHILS # BLD: 0 K/UL (ref 0–0.2)
BASOPHILS NFR BLD: 0.3 %
BILIRUB SERPL-MCNC: 0.3 MG/DL (ref 0–1)
BUN SERPL-MCNC: 50 MG/DL (ref 7–20)
CALCIUM SERPL-MCNC: 8.3 MG/DL (ref 8.3–10.6)
CHLORIDE SERPL-SCNC: 95 MMOL/L (ref 99–110)
CO2 SERPL-SCNC: 31 MMOL/L (ref 21–32)
CREAT SERPL-MCNC: 1.9 MG/DL (ref 0.6–1.2)
DEPRECATED RDW RBC AUTO: 20.9 % (ref 12.4–15.4)
EOSINOPHIL # BLD: 0.3 K/UL (ref 0–0.6)
EOSINOPHIL NFR BLD: 2.7 %
GFR SERPLBLD CREATININE-BSD FMLA CKD-EPI: 27 ML/MIN/{1.73_M2}
GLUCOSE SERPL-MCNC: 95 MG/DL (ref 70–99)
HCT VFR BLD AUTO: 22.6 % (ref 36–48)
HCT VFR BLD AUTO: 23.7 % (ref 36–48)
HGB BLD-MCNC: 7.5 G/DL (ref 12–16)
HGB BLD-MCNC: 7.7 G/DL (ref 12–16)
LYMPHOCYTES # BLD: 0.8 K/UL (ref 1–5.1)
LYMPHOCYTES NFR BLD: 6 %
MAGNESIUM SERPL-MCNC: 1.9 MG/DL (ref 1.8–2.4)
MCH RBC QN AUTO: 28.4 PG (ref 26–34)
MCHC RBC AUTO-ENTMCNC: 33 G/DL (ref 31–36)
MCV RBC AUTO: 86.1 FL (ref 80–100)
MONOCYTES # BLD: 1 K/UL (ref 0–1.3)
MONOCYTES NFR BLD: 7.7 %
NEUTROPHILS # BLD: 10.5 K/UL (ref 1.7–7.7)
NEUTROPHILS NFR BLD: 83.3 %
PHOSPHATE SERPL-MCNC: 3.6 MG/DL (ref 2.5–4.9)
PLATELET # BLD AUTO: 210 K/UL (ref 135–450)
PMV BLD AUTO: 9 FL (ref 5–10.5)
POTASSIUM SERPL-SCNC: 3.8 MMOL/L (ref 3.5–5.1)
PROT SERPL-MCNC: 5.4 G/DL (ref 6.4–8.2)
RBC # BLD AUTO: 2.63 M/UL (ref 4–5.2)
SODIUM SERPL-SCNC: 136 MMOL/L (ref 136–145)
WBC # BLD AUTO: 12.6 K/UL (ref 4–11)

## 2023-04-28 PROCEDURE — 6370000000 HC RX 637 (ALT 250 FOR IP): Performed by: INTERNAL MEDICINE

## 2023-04-28 PROCEDURE — 97530 THERAPEUTIC ACTIVITIES: CPT

## 2023-04-28 PROCEDURE — 2700000000 HC OXYGEN THERAPY PER DAY

## 2023-04-28 PROCEDURE — 94760 N-INVAS EAR/PLS OXIMETRY 1: CPT

## 2023-04-28 PROCEDURE — 6370000000 HC RX 637 (ALT 250 FOR IP): Performed by: NURSE PRACTITIONER

## 2023-04-28 PROCEDURE — 80053 COMPREHEN METABOLIC PANEL: CPT

## 2023-04-28 PROCEDURE — 6370000000 HC RX 637 (ALT 250 FOR IP): Performed by: PHYSICIAN ASSISTANT

## 2023-04-28 PROCEDURE — 85018 HEMOGLOBIN: CPT

## 2023-04-28 PROCEDURE — 94640 AIRWAY INHALATION TREATMENT: CPT

## 2023-04-28 PROCEDURE — 85014 HEMATOCRIT: CPT

## 2023-04-28 PROCEDURE — 97116 GAIT TRAINING THERAPY: CPT

## 2023-04-28 PROCEDURE — 97110 THERAPEUTIC EXERCISES: CPT

## 2023-04-28 PROCEDURE — 36415 COLL VENOUS BLD VENIPUNCTURE: CPT

## 2023-04-28 PROCEDURE — 83735 ASSAY OF MAGNESIUM: CPT

## 2023-04-28 PROCEDURE — 84100 ASSAY OF PHOSPHORUS: CPT

## 2023-04-28 PROCEDURE — 85025 COMPLETE CBC W/AUTO DIFF WBC: CPT

## 2023-04-28 PROCEDURE — 2580000003 HC RX 258: Performed by: INTERNAL MEDICINE

## 2023-04-28 RX ORDER — PANTOPRAZOLE SODIUM 40 MG/1
40 TABLET, DELAYED RELEASE ORAL
Qty: 30 TABLET | Refills: 3 | Status: SHIPPED | OUTPATIENT
Start: 2023-04-29

## 2023-04-28 RX ORDER — POLYETHYLENE GLYCOL 3350 17 G/17G
17 POWDER, FOR SOLUTION ORAL DAILY PRN
Qty: 527 G | Refills: 0 | Status: SHIPPED | OUTPATIENT
Start: 2023-04-28 | End: 2023-05-28

## 2023-04-28 RX ADMIN — ROSUVASTATIN CALCIUM 20 MG: 20 TABLET, FILM COATED ORAL at 08:46

## 2023-04-28 RX ADMIN — ISOSORBIDE MONONITRATE 120 MG: 60 TABLET, EXTENDED RELEASE ORAL at 08:46

## 2023-04-28 RX ADMIN — RIVAROXABAN 15 MG: 15 TABLET, FILM COATED ORAL at 08:46

## 2023-04-28 RX ADMIN — PANTOPRAZOLE SODIUM 40 MG: 40 TABLET, DELAYED RELEASE ORAL at 05:54

## 2023-04-28 RX ADMIN — TIOTROPIUM BROMIDE INHALATION SPRAY 2 PUFF: 3.12 SPRAY, METERED RESPIRATORY (INHALATION) at 08:32

## 2023-04-28 RX ADMIN — POLYETHYLENE GLYCOL 3350 17 G: 17 POWDER, FOR SOLUTION ORAL at 08:55

## 2023-04-28 RX ADMIN — MICONAZOLE NITRATE: 2 POWDER TOPICAL at 08:46

## 2023-04-28 RX ADMIN — DILTIAZEM HYDROCHLORIDE 120 MG: 120 CAPSULE, EXTENDED RELEASE ORAL at 08:46

## 2023-04-28 RX ADMIN — TORSEMIDE 20 MG: 20 TABLET ORAL at 08:46

## 2023-04-28 RX ADMIN — DOFETILIDE 250 MCG: 0.25 CAPSULE ORAL at 08:45

## 2023-04-28 RX ADMIN — MOMETASONE FUROATE AND FORMOTEROL FUMARATE DIHYDRATE 2 PUFF: 200; 5 AEROSOL RESPIRATORY (INHALATION) at 08:32

## 2023-04-28 RX ADMIN — RANOLAZINE 500 MG: 500 TABLET, FILM COATED, EXTENDED RELEASE ORAL at 08:46

## 2023-04-28 RX ADMIN — HYDRALAZINE HYDROCHLORIDE 50 MG: 50 TABLET, FILM COATED ORAL at 05:54

## 2023-04-28 RX ADMIN — Medication 10 ML: at 08:47

## 2023-04-28 ASSESSMENT — PAIN SCALES - GENERAL: PAINLEVEL_OUTOF10: 0

## 2023-04-28 ASSESSMENT — ENCOUNTER SYMPTOMS
GASTROINTESTINAL NEGATIVE: 1
SHORTNESS OF BREATH: 0

## 2023-04-28 NOTE — CARE COORDINATION
04/28/23 1252   IMM Letter   IMM Letter given to Patient/Family/Significant other/Guardian/POA/by: attempted to speak with patient re:IMM, but patient already discharged and escorted to family vehicle by unit staff     Electronically signed by Mavis Kaur RN on 4/28/2023 at 12:53 PM

## 2023-04-28 NOTE — DISCHARGE SUMMARY
Date: 4/26/2023  No dictation       Treatments: As above. Discharge Medications:     Medication List        START taking these medications      miconazole 2 % powder  Commonly known as: MICOTIN  Apply topically 2 times daily. Apply to abd folds.      pantoprazole 40 MG tablet  Commonly known as: PROTONIX  Take 1 tablet by mouth every morning (before breakfast)  Start taking on: April 29, 2023     polyethylene glycol 17 g packet  Commonly known as: GLYCOLAX  Take 17 g by mouth daily as needed for Constipation            CHANGE how you take these medications      dilTIAZem 120 MG extended release capsule  Commonly known as: CARDIZEM CD  Take 1 capsule by mouth daily  What changed:   how much to take  how to take this  when to take this  additional instructions     rivaroxaban 15 MG Tabs tablet  Commonly known as: XARELTO  Take 1 tablet by mouth daily (with breakfast)  What changed:   medication strength  how much to take  how to take this  when to take this  additional instructions     torsemide 20 MG tablet  Commonly known as: DEMADEX  Take 1 tablet by mouth daily  What changed:   medication strength  how much to take  additional instructions            CONTINUE taking these medications      albuterol (2.5 MG/3ML) 0.083% nebulizer solution  Commonly known as: PROVENTIL     clobetasol 0.05 % cream  Commonly known as: TEMOVATE     dofetilide 250 MCG capsule  Commonly known as: TIKOSYN  TAKE ONE CAPSULE BY MOUTH EVERY 12 HOURS     ferrous sulfate 325 (65 Fe) MG tablet  Commonly known as: IRON 325  Take 1 tablet by mouth 2 times daily     hydrALAZINE 50 MG tablet  Commonly known as: APRESOLINE  TAKE ONE TABLET BY MOUTH EVERY 8 HOURS     isosorbide mononitrate 120 MG extended release tablet  Commonly known as: IMDUR  Take 1 tablet by mouth daily     MULTI FOR HER 50+ PO     ranolazine 500 MG extended release tablet  Commonly known as: Ranexa  Take 1 tablet by mouth 2 times daily     rosuvastatin 20 MG tablet  Commonly

## 2023-04-28 NOTE — CONSENT
Informed Consent for Blood Component Transfusion Note    I have discussed with the patient the rationale for blood component transfusion; its benefits in treating or preventing fatigue, organ damage, or death; and its risk which includes mild transfusion reactions, rare risk of blood borne infection, or more serious but rare reactions. I have discussed the alternatives to transfusion, including the risk and consequences of not receiving transfusion. The patient had an opportunity to ask questions and had agreed to proceed with transfusion of blood components.     Electronically signed by Gt Bruno MD on 4/28/23 at 12:05 PM EDT

## 2023-04-28 NOTE — CARE COORDINATION
[facility]    DC order noted, all docs needed have been faxed to [facility] for home care services.     Home care to see patient by 4/30/23    Joe Waddell RN, BSN CTN  [facility] [phone]

## 2023-04-28 NOTE — CARE COORDINATION
[facility]    Referral received from  to follow for home care services. I will follow for needs, and speak with patient to verify demos.     Kelly Hamilton RN, BSN CTN  [facility] [phone]

## 2023-04-28 NOTE — PLAN OF CARE
Problem: Discharge Planning  Goal: Discharge to home or other facility with appropriate resources  4/25/2023 1036 by Regan Huerta RN  Outcome: Progressing  4/25/2023 1036 by Regan Huerta RN  Outcome: Progressing  4/25/2023 0537 by Maria Luz Monahan RN  Outcome: Progressing     Problem: ABCDS Injury Assessment  Goal: Absence of physical injury  4/25/2023 1036 by Regan Huerta RN  Outcome: Progressing  4/25/2023 1036 by Regan Huerta RN  Outcome: Progressing  4/25/2023 0537 by Maria Luz Monahan RN  Outcome: Progressing     Problem: Safety - Adult  Goal: Free from fall injury  Outcome: Progressing  Note: Fall precautions in place. Bed locked and in lowest position. Alarm on. Call light within reach.
Problem: Discharge Planning  Goal: Discharge to home or other facility with appropriate resources  4/26/2023 0026 by Renee Bullock RN  Outcome: Progressing  4/25/2023 1036 by Sameera Laws RN  Outcome: Progressing  4/25/2023 1036 by Sameera Laws RN  Outcome: Progressing     Problem: ABCDS Injury Assessment  Goal: Absence of physical injury  4/26/2023 0026 by Renee Bullock RN  Outcome: Progressing  4/25/2023 1036 by Sameera Laws RN  Outcome: Progressing  4/25/2023 1036 by Sameera Laws RN  Outcome: Progressing     Problem: Safety - Adult  Goal: Free from fall injury  4/26/2023 0026 by Renee Bullock RN  Outcome: Progressing  4/25/2023 1036 by Sameera Laws RN  Outcome: Progressing  Note: Fall precautions in place. Bed locked and in lowest position. Alarm on. Call light within reach. Problem: Respiratory - Adult  Goal: Achieves optimal ventilation and oxygenation  Outcome: Progressing     Problem: Cardiovascular - Adult  Goal: Maintains optimal cardiac output and hemodynamic stability  Outcome: Progressing  Goal: Absence of cardiac dysrhythmias or at baseline  Outcome: Progressing     Problem: Metabolic/Fluid and Electrolytes - Adult  Goal: Electrolytes maintained within normal limits  Outcome: Progressing  Goal: Hemodynamic stability and optimal renal function maintained  Outcome: Progressing     Problem: Hematologic - Adult  Goal: Maintains hematologic stability  Outcome: Progressing     Problem: Skin/Tissue Integrity  Goal: Absence of new skin breakdown  Description: 1. Monitor for areas of redness and/or skin breakdown  2. Assess vascular access sites hourly  3. Every 4-6 hours minimum:  Change oxygen saturation probe site  4. Every 4-6 hours:  If on nasal continuous positive airway pressure, respiratory therapy assess nares and determine need for appliance change or resting period.   Outcome: Progressing
Problem: Discharge Planning  Goal: Discharge to home or other facility with appropriate resources  4/26/2023 0945 by Walt Cisneros RN  Outcome: Progressing  4/26/2023 0027 by Jonatan Brock RN  Outcome: Progressing  4/26/2023 0026 by Jonatan Brock RN  Outcome: Progressing     Problem: ABCDS Injury Assessment  Goal: Absence of physical injury  4/26/2023 0945 by Walt Cisneros RN  Outcome: Progressing  4/26/2023 0027 by Jonatan Brock RN  Outcome: Progressing  4/26/2023 0026 by Jonatan Brock RN  Outcome: Progressing     Problem: Safety - Adult  Goal: Free from fall injury  4/26/2023 0945 by Walt Cisneros RN  Outcome: Progressing  Note: Fall precautions in place. Bed locked and in lowest position. Alarm on. Call light within reach.    4/26/2023 0027 by Jonatan Brock RN  Outcome: Progressing  4/26/2023 0026 by Jonatan Brock RN  Outcome: Progressing     Problem: Respiratory - Adult  Goal: Achieves optimal ventilation and oxygenation  4/26/2023 0945 by Walt Cisneros RN  Outcome: Progressing  4/26/2023 0027 by Jonatan Brock RN  Outcome: Progressing  4/26/2023 0026 by Jonatan Brock RN  Outcome: Progressing     Problem: Cardiovascular - Adult  Goal: Maintains optimal cardiac output and hemodynamic stability  4/26/2023 0945 by Walt Cisneros RN  Outcome: Progressing  4/26/2023 0027 by Jonatan Brock RN  Outcome: Progressing  4/26/2023 0026 by Jonatan Brock RN  Outcome: Progressing  Goal: Absence of cardiac dysrhythmias or at baseline  4/26/2023 0945 by Walt Cisneros RN  Outcome: Progressing  4/26/2023 0027 by Jonatan Brock RN  Outcome: Progressing  4/26/2023 0026 by Jonatan Brock RN  Outcome: Progressing     Problem: Metabolic/Fluid and Electrolytes - Adult  Goal: Electrolytes maintained within normal limits  4/26/2023 0945 by Walt Cisneros RN  Outcome: Progressing  4/26/2023 0027 by Jonatan Brock RN  Outcome: Progressing  4/26/2023 0026 by Jonatan Brock RN  Outcome: Progressing  Goal: Hemodynamic stability and
Problem: Discharge Planning  Goal: Discharge to home or other facility with appropriate resources  4/27/2023 1453 by Minnie Ramirez RN  Outcome: Progressing     Problem: ABCDS Injury Assessment  Goal: Absence of physical injury  4/27/2023 1453 by Minnie Ramirez RN  Outcome: Progressing     Problem: Safety - Adult  Goal: Free from fall injury  4/27/2023 1453 by Minnie Ramirez RN  Outcome: Progressing     Problem: Respiratory - Adult  Goal: Achieves optimal ventilation and oxygenation  4/27/2023 1453 by Minnie Ramirez RN  Outcome: Progressing     Problem: Cardiovascular - Adult  Goal: Maintains optimal cardiac output and hemodynamic stability  4/27/2023 1453 by Minnie Ramirez RN  Outcome: Progressing     Problem: Cardiovascular - Adult  Goal: Absence of cardiac dysrhythmias or at baseline  4/27/2023 1453 by Minnie Ramirez RN  Outcome: Progressing     Problem: Metabolic/Fluid and Electrolytes - Adult  Goal: Electrolytes maintained within normal limits  4/27/2023 1453 by Minnie Ramirez RN  Outcome: Progressing     Problem: Metabolic/Fluid and Electrolytes - Adult  Goal: Hemodynamic stability and optimal renal function maintained  4/27/2023 1453 by Minnie Ramirez RN  Outcome: Progressing     Problem: Hematologic - Adult  Goal: Maintains hematologic stability  4/27/2023 1453 by Minnie Ramirez RN  Outcome: Progressing     Problem: Skin/Tissue Integrity - Adult  Goal: Skin integrity remains intact  Outcome: Progressing  Flowsheets (Taken 4/27/2023 0436 by Heather Mata RN)  Skin Integrity Remains Intact: Monitor for areas of redness and/or skin breakdown     Problem: Gastrointestinal - Adult  Goal: Maintains or returns to baseline bowel function  Outcome: Progressing     Problem: Gastrointestinal - Adult  Goal: Maintains adequate nutritional intake  Outcome: Progressing     Problem: Infection - Adult  Goal: Absence of infection during hospitalization  Outcome: Progressing     Problem: Skin/Tissue Integrity  Goal:
Problem: Discharge Planning  Goal: Discharge to home or other facility with appropriate resources  4/28/2023 0027 by Angie Reynolds RN  Outcome: Progressing  4/27/2023 1453 by Teresa Vincent RN  Outcome: Progressing     Problem: ABCDS Injury Assessment  Goal: Absence of physical injury  4/28/2023 0027 by Angie Reynolds RN  Outcome: Progressing  4/27/2023 1453 by Teresa Vincent RN  Outcome: Progressing     Problem: Safety - Adult  Goal: Free from fall injury  4/28/2023 0027 by Angie Reynolds RN  Outcome: Progressing  4/27/2023 1453 by Teresa Vincent RN  Outcome: Progressing     Problem: Respiratory - Adult  Goal: Achieves optimal ventilation and oxygenation  4/28/2023 0027 by Angie Reynolds RN  Outcome: Progressing  4/27/2023 1453 by Teresa Vincent RN  Outcome: Progressing     Problem: Cardiovascular - Adult  Goal: Maintains optimal cardiac output and hemodynamic stability  4/28/2023 0027 by Angie Reynolds RN  Outcome: Progressing  4/27/2023 1453 by Teresa Vincent RN  Outcome: Progressing  Goal: Absence of cardiac dysrhythmias or at baseline  4/28/2023 0027 by Angie Reynolds RN  Outcome: Progressing  4/27/2023 1453 by Teresa Vincent RN  Outcome: Progressing     Problem: Metabolic/Fluid and Electrolytes - Adult  Goal: Electrolytes maintained within normal limits  4/28/2023 0027 by Angie Reynolds RN  Outcome: Progressing  4/27/2023 1453 by Teresa Vincent RN  Outcome: Progressing  Goal: Hemodynamic stability and optimal renal function maintained  4/28/2023 0027 by Angie Reynolds RN  Outcome: Progressing  4/27/2023 1453 by Teresa Vincent RN  Outcome: Progressing     Problem: Hematologic - Adult  Goal: Maintains hematologic stability  4/28/2023 0027 by Angie Reynolds RN  Outcome: Progressing  4/27/2023 1453 by Teresa Vincent RN  Outcome: Progressing     Problem: Skin/Tissue Integrity  Goal: Absence of new skin breakdown  Description: 1.   Monitor for areas of redness and/or skin
Problem: Discharge Planning  Goal: Discharge to home or other facility with appropriate resources  4/28/2023 1121 by Addison Sotomayor RN  Outcome: Progressing     Problem: ABCDS Injury Assessment  Goal: Absence of physical injury  4/28/2023 1121 by Addison Sotomayor RN  Outcome: Progressing     Problem: Safety - Adult  Goal: Free from fall injury  4/28/2023 1121 by Addison Sotomayor RN  Outcome: Progressing     Problem: Respiratory - Adult  Goal: Achieves optimal ventilation and oxygenation  4/28/2023 1121 by Addison Sotomayor RN  Outcome: Progressing     Problem: Cardiovascular - Adult  Goal: Maintains optimal cardiac output and hemodynamic stability  4/28/2023 1121 by Addison Sotomayor RN  Outcome: Progressing     Problem: Cardiovascular - Adult  Goal: Absence of cardiac dysrhythmias or at baseline  4/28/2023 1121 by Addison Sotomayor RN  Outcome: Progressing     Problem: Metabolic/Fluid and Electrolytes - Adult  Goal: Electrolytes maintained within normal limits  4/28/2023 1121 by Addison Sotomayor RN  Outcome: Progressing     Problem: Metabolic/Fluid and Electrolytes - Adult  Goal: Hemodynamic stability and optimal renal function maintained  4/28/2023 1121 by Addison Sotomayor RN  Outcome: Progressing     Problem: Hematologic - Adult  Goal: Maintains hematologic stability  4/28/2023 1121 by Addison Sotomayor RN  Outcome: Progressing     Problem: Skin/Tissue Integrity - Adult  Goal: Skin integrity remains intact  4/28/2023 1121 by Addison Sotomayor RN  Outcome: Progressing     Problem: Gastrointestinal - Adult  Goal: Maintains or returns to baseline bowel function  4/28/2023 1121 by Addison Sotomayor RN  Outcome: Progressing     Problem: Gastrointestinal - Adult  Goal: Maintains adequate nutritional intake  4/28/2023 1121 by Addison Sotomayor RN  Outcome: Progressing     Problem: Infection - Adult  Goal: Absence of infection during hospitalization  4/28/2023 1121 by Addison Sotomayor RN  Outcome: Progressing     Problem: Musculoskeletal -
Problem: Discharge Planning  Goal: Discharge to home or other facility with appropriate resources  4/28/2023 1311 by Sherry Oliveros RN  Outcome: Completed     Problem: ABCDS Injury Assessment  Goal: Absence of physical injury  4/28/2023 1311 by Sherry Oliveros RN  Outcome: Completed     Problem: Safety - Adult  Goal: Free from fall injury  4/28/2023 1311 by Sherry Oliveros RN  Outcome: Completed     Problem: Respiratory - Adult  Goal: Achieves optimal ventilation and oxygenation  4/28/2023 1311 by Sherry Oliveros RN  Outcome: Completed     Problem: Cardiovascular - Adult  Goal: Maintains optimal cardiac output and hemodynamic stability  4/28/2023 1311 by Sherry Oliveros RN  Outcome: Completed     Problem: Cardiovascular - Adult  Goal: Absence of cardiac dysrhythmias or at baseline  4/28/2023 1311 by Sherry Oliveros RN  Outcome: Completed     Problem: Metabolic/Fluid and Electrolytes - Adult  Goal: Electrolytes maintained within normal limits  4/28/2023 1311 by Sherry Oliveros RN  Outcome: Completed     Problem: Metabolic/Fluid and Electrolytes - Adult  Goal: Hemodynamic stability and optimal renal function maintained  4/28/2023 1311 by Sherry Oliveros RN  Outcome: Completed     Problem: Hematologic - Adult  Goal: Maintains hematologic stability  4/28/2023 1311 by Sherry Oliveros RN  Outcome: Completed     Problem: Skin/Tissue Integrity - Adult  Goal: Skin integrity remains intact  4/28/2023 1311 by Sherry Oliveros RN  Outcome: Completed     Problem: Gastrointestinal - Adult  Goal: Maintains or returns to baseline bowel function  4/28/2023 1311 by Sherry Oliveros RN  Outcome: Completed     Problem: Gastrointestinal - Adult  Goal: Maintains adequate nutritional intake  4/28/2023 1311 by Sherry Oliveros RN  Outcome: Completed     Problem: Infection - Adult  Goal: Absence of infection during hospitalization  4/28/2023 1311 by Sherry Oliveros RN  Outcome: Completed     Problem: Musculoskeletal - Adult  Goal: Return mobility to
Problem: Discharge Planning  Goal: Discharge to home or other facility with appropriate resources  Outcome: Progressing     Problem: ABCDS Injury Assessment  Goal: Absence of physical injury  Outcome: Progressing
Problem: Respiratory - Adult  Goal: Achieves optimal ventilation and oxygenation  Outcome: Progressing  Flowsheets (Taken 4/27/2023 0436)  Achieves optimal ventilation and oxygenation:   Assess for changes in respiratory status   Position to facilitate oxygenation and minimize respiratory effort     Problem: Cardiovascular - Adult  Goal: Maintains optimal cardiac output and hemodynamic stability  Outcome: Progressing  Flowsheets (Taken 4/27/2023 0436)  Maintains optimal cardiac output and hemodynamic stability:   Monitor blood pressure and heart rate   Monitor urine output and notify Licensed Independent Practitioner for values outside of normal range   Assess for signs of decreased cardiac output     Problem: Cardiovascular - Adult  Goal: Absence of cardiac dysrhythmias or at baseline  Outcome: Progressing  Flowsheets (Taken 4/27/2023 0436)  Absence of cardiac dysrhythmias or at baseline:   Monitor cardiac rate and rhythm   Assess for signs of decreased cardiac output   Administer antiarrhythmia medication and electrolyte replacement as ordered     Problem: Metabolic/Fluid and Electrolytes - Adult  Goal: Hemodynamic stability and optimal renal function maintained  Outcome: Progressing  Flowsheets (Taken 4/27/2023 0436)  Hemodynamic stability and optimal renal function maintained:   Monitor labs and assess for signs and symptoms of volume excess or deficit   Monitor intake, output and patient weight   Monitor response to interventions for patient's volume status, including labs, urine output, blood pressure (other measures as available)   Encourage oral intake as appropriate     Problem: Skin/Tissue Integrity  Goal: Absence of new skin breakdown  Description: 1. Monitor for areas of redness and/or skin breakdown  2. Assess vascular access sites hourly  3. Every 4-6 hours minimum:  Change oxygen saturation probe site  4.   Every 4-6 hours:  If on nasal continuous positive airway pressure, respiratory therapy
CABRERA Méndez  Outcome: Progressing     Problem: Hematologic - Adult  Goal: Maintains hematologic stability  4/26/2023 0027 by Bernadette Merchant RN  Outcome: Progressing  4/26/2023 0026 by Bernadette Merchant RN  Outcome: Progressing     Problem: Skin/Tissue Integrity  Goal: Absence of new skin breakdown  Description: 1. Monitor for areas of redness and/or skin breakdown  2. Assess vascular access sites hourly  3. Every 4-6 hours minimum:  Change oxygen saturation probe site  4. Every 4-6 hours:  If on nasal continuous positive airway pressure, respiratory therapy assess nares and determine need for appliance change or resting period.   4/26/2023 0027 by Bernadette Merchant RN  Outcome: Progressing  4/26/2023 0026 by Bernadette Merchant RN  Outcome: Progressing

## 2023-04-28 NOTE — PROGRESS NOTES
4 Eyes Skin Assessment     NAME:  Shun Crump  YOB: 1945  MEDICAL RECORD NUMBER:  [id number]    The patient is being assessed for  Admission    I agree that at lease one RN has performed a thorough Head to Toe Skin Assessment on the patient. ALL assessment sites listed below have been assessed. Areas assessed by both nurses:    Head, Face, Ears, Shoulders, Back, Chest, Arms, Elbows, Hands, Sacrum. Buttock, Coccyx, Ischium, Legs. Feet and Heels, and Under Medical Devices         Does the Patient have a Wound?  No noted wound(s)       Braydon Prevention initiated by RN: Yes  Wound Care Orders initiated by RN: No    Pressure Injury (Stage 3,4, Unstageable, DTI, NWPT, and Complex wounds) if present, place referral order by RN under : No    New Ostomies, if present place, referral order under : No     Nurse 1 eSignature: Electronically signed by Bernadette Merchant RN on 4/25/23 at 1:09 AM EDT    **SHARE this note so that the co-signing nurse can place an eSignature**    Nurse 2 eSignature: Electronically signed by Emery Thomas RN on 4/25/23 at 1:52 AM EDT
Cardiac Electrophysiology Progress Note     Admit Date: 4/24/2023     Reason for follow up: atrial fibrillation, renal failure on dofetilide    HPI and Interval History:   Gary Castaneda is a 69y.o. year old female with past medical history significant for persistent atrial fibrillation, AAA, CAD, HTN, COPD, PE, DVT and diastolic HF s/p CardioMems. She underwent EP study with radiofrequency ablation of atrial fibrillation and pulmonary vein isolation, additional ablation with creation of a roof line (for L flutter) and anterior line from mitral annulus to the roof (for L flutter) on 10/7/20 with Dr. Hoang Mcfarland. She had recurrent A fib when seen in the office for follow up in January 2021. She underwent successful DC cardioversion on 1/12/21. She then underwent EP study with RFA of left atrial flutter with pulmonary vein isolation, roof line ablation (for L flutter), anterior wall (L flutter), appendage ablation (for L flutter) and low anterior wall ablation (for L flutter) on 2/17/21 with Dr. Hoang Mcfarland. She had recurrent atrial flutter when seen in the office on 1/25/22. She underwent atrial fibrillation (PVI, CAFE), left atrial flutter (roof line) and left atrial tachycardia ablation on 2/4/22 with Dr. Hoang Mcfarland. She was resumed on flecaindie 50mg BID and Toprol was decreased to 25mg QD. She was back in atrial fibrillation when seen by Annie Hopkins NP on 3/21/22. She was seen in the office last week and remained in atrial fibrillation. She did not want to schedule any procedures at that time and instead wanted to increase flecainide to 100mg BID. She remained in atrial fibrillation when seen in the office on 4/12/22. She underwent successful cardioversion on 4/15/22 with Dr. Eugene Waldron. She She was eventually started on dofetilide in May 2022. In November 2022 she was hospitalized at [facility] for heart failure exacerbation. She had periods of atrial runs, PSVT and frequent PACs while admitted.  She seemed to respond better to
Contacted Dr. Maria Luz Shannon about patient having 7, 5 second pauses. He reviewed her meds and is putting in a consult for cardiology and holding her cardizem and metroprolol.
Discharge orders acknowledged by RN . Discharge teaching completed with pt and family. AVS reviewed and all questions answered. Medication regimen reviewed and pt understands schedule. Follow up appointments also reviewed with pt and resources given for discharge. Pt received meds to beds, and understands schedule. IV removed. Bedside monitor removed from pt. Pt vitals WDL. Pt discharged with all belongings to home with . Pt transported off of unit via wheelchair. No complications.      Electronically signed by Juan Leiva RN on 4/28/2023 at 1:12 PM
ED SBAR report provider to [facility]. Patient to be transported to Room 5280 via stretcher by ED tech. Patient transported with bedside cardiac monitor and with IV medications infusing. IV site clean, dry, and intact. MEWS score and pain assessed as 3/10 and documented. Updated patient and family on plan of care.
Hospital Medicine Progress Note     Date:  4/25/2023    PCP: Ghislaine Daniels MD (Tel: [phone])    Date of Admission: 4/24/2023    Chief complaint:   Chief Complaint   Patient presents with    Shortness of Breath    Fatigue     Pt arrives via hospital wheelchair for eval of sob and fatigue, pt then states called to come to ed d/t low blood levels. Pt is on chronic O2 at 4L        Brief admission history: 20-year-old female with history of AAA, atrial fibrillation, chronic diastolic heart failure (most recent echocardiogram from November 2022 with grade 2 diastolic dysfunction, ejection fraction of 70%), morbid obesity with BMI of 41 kg/m², COPD, CKD stage III (baseline creatinine around 2.1-2.2), history of thrombosis, essential hypertension, hyperlipidemia, CAD, chronic anticoagulation use (on Xarelto), chronic respiratory failure with hypoxia (on 4 L/min supplemental oxygen), who was referred to the emergency room by her physician for evaluation of shortness of breath, low hemoglobin around 6. In the emergency room, she had a positive stool guaiac. In the emergency room, she was bradycardic with heart rate in the 40s. Assessment/plan:  Acute upper GI bleed. Continue Protonix, intravenous fluid. GI has been consulted to assist with management. Hold/avoid anticoagulants and antiplatelets for now. Acute blood loss anemia secondary to GI bleed. Baseline hemoglobin around 8 to 9, presents with hemoglobin of 6. Transfuse to maintain hemoglobin greater than 7.0. Iron studies ordered on 4/24/2023 unremarkable, folate and B12 levels elevated. This is likely still iron deficiency anemia, will eventually need repeat iron studies to confirm. Sinus bradycardia. Patient was on beta-blocker and Cardizem prior to admission; will hold. Cardiology consulted to assist with evaluation. TSH is normal in March 2023. Leukocytosis. Likely reactive. No evidence of bleeding. Monitor closely.   Other
Hospital Medicine Progress Note     Date:  4/26/2023    PCP: Daniela Beaulieu MD (Tel: [phone])    Date of Admission: 4/24/2023    Chief complaint:   Chief Complaint   Patient presents with    Shortness of Breath    Fatigue     Pt arrives via hospital wheelchair for eval of sob and fatigue, pt then states called to come to ed d/t low blood levels. Pt is on chronic O2 at 4L        Brief admission history: 72-year-old female with history of AAA, atrial fibrillation, chronic diastolic heart failure (most recent echocardiogram from November 2022 with grade 2 diastolic dysfunction, ejection fraction of 70%), morbid obesity with BMI of 41 kg/m², COPD, CKD stage III (baseline creatinine around 2.1-2.2), history of thrombosis, essential hypertension, hyperlipidemia, CAD, chronic anticoagulation use (on Xarelto), chronic respiratory failure with hypoxia (on 4 L/min supplemental oxygen), who was referred to the emergency room by her physician for evaluation of shortness of breath, low hemoglobin around 6. In the emergency room, she had a positive stool guaiac. In the emergency room, she was bradycardic with heart rate in the 40s. Assessment/plan:  Acute upper GI bleed. Continue Protonix. GI on board and assisting with management. Hold/avoid anticoagulants and antiplatelets for now. Discontinued IVF due to fluid overload. Acute blood loss anemia, secondary to GI bleed. Baseline hemoglobin around 8 to 9, presents with hemoglobin of 6. Transfuse to maintain hemoglobin greater than 7.0. On iron replacement therapy. Getting another PRBC transfusion today. Acute on chronic diastolic heart failure. Secondary to fluid overload from transfusion, IVF. Discontinued IVF 4/26/2023. On IV lasix. Sinus bradycardia, resolved. Patient was on beta-blocker and Cardizem prior to admission; will hold. TSH is normal in March 2023. Cardiology on board. Leukocytosis. Likely reactive. No evidence of bleeding.  Monitor
Hospital Medicine Progress Note     Date:  4/27/2023    PCP: Claudia Jackson MD (Tel: [phone])    Date of Admission: 4/24/2023    Chief complaint:   Chief Complaint   Patient presents with    Shortness of Breath    Fatigue     Pt arrives via hospital wheelchair for eval of sob and fatigue, pt then states called to come to ed d/t low blood levels. Pt is on chronic O2 at 4L        Brief admission history: 21-year-old female with history of AAA, atrial fibrillation, chronic diastolic heart failure (most recent echocardiogram from November 2022 with grade 2 diastolic dysfunction, ejection fraction of 70%), morbid obesity with BMI of 41 kg/m², COPD, CKD stage III (baseline creatinine around 2.1-2.2), history of thrombosis, essential hypertension, hyperlipidemia, CAD, chronic anticoagulation use (on Xarelto), chronic respiratory failure with hypoxia (on 4 L/min supplemental oxygen), who was referred to the emergency room by her physician for evaluation of shortness of breath, low hemoglobin around 6. In the emergency room, she had a positive stool guaiac. In the emergency room, she was bradycardic with heart rate in the 40s. Assessment/plan:  Acute upper GI bleed. Continue protonix. S'p EGD with treatment as noted below. Recommend repeat H&H within 1 week of discharge. Outpatient follow up with GI.  EGD 4/26/2023: \"LA grade B esophagitis, 4 mm nonbleeding AVM in the gastric body, obliterated with APC, prominent mucosal fold adjacent to ampulla without evidence of adenoma or ulceration. \"  Acute blood loss anemia, secondary to GI bleed. Baseline hemoglobin around 8 to 9, presents with hemoglobin of 6. Status post transfusion of 3 units of PRBC since admission. Continue iron replacement therapy. Acute on chronic diastolic heart failure. Secondary to fluid overload from transfusion, IVF. Discontinued IVF 4/26/2023. Continue IV lasix. Repeat CXR today. Sinus bradycardia, resolved.   Patient was on
Hospital Medicine Progress Note     Date:  4/28/2023    PCP: Judi Gamez MD (Tel: [phone])    Date of Admission: 4/24/2023    Chief complaint:   Chief Complaint   Patient presents with    Shortness of Breath    Fatigue     Pt arrives via hospital wheelchair for eval of sob and fatigue, pt then states called to come to ed d/t low blood levels. Pt is on chronic O2 at 4L        Brief admission history: 50-year-old female with history of AAA, atrial fibrillation, chronic diastolic heart failure (most recent echocardiogram from November 2022 with grade 2 diastolic dysfunction, ejection fraction of 70%), morbid obesity with BMI of 41 kg/m², COPD, CKD stage III (baseline creatinine around 2.1-2.2), history of thrombosis, essential hypertension, hyperlipidemia, CAD, chronic anticoagulation use (on Xarelto), chronic respiratory failure with hypoxia (on 4 L/min supplemental oxygen), who was referred to the emergency room by her physician for evaluation of shortness of breath, low hemoglobin around 6. In the emergency room, she had a positive stool guaiac. In the emergency room, she was bradycardic with heart rate in the 40s. Assessment/plan:  Acute upper GI bleed. Continue protonix. S'p EGD with treatment as noted below. Recommend repeat H&H within 1 week of discharge. Outpatient follow up with GI.  EGD 4/26/2023: \"LA grade B esophagitis, 4 mm nonbleeding AVM in the gastric body, obliterated with APC, prominent mucosal fold adjacent to ampulla without evidence of adenoma or ulceration. \"  Acute blood loss anemia, secondary to GI bleed. Baseline hemoglobin around 8 to 9, presents with hemoglobin of 6. Status post transfusion of 3 units of PRBC since admission. Continue iron replacement therapy. Acute on chronic diastolic heart failure. Secondary to fluid overload from transfusion, IVF. Discontinued IVF 4/26/2023. She was on IV lasix which has since been discontinued. Sinus bradycardia, resolved.   Patient
INPATIENT CONSULTATION:    IDENTIFYING DATA/REASON FOR CONSULTATION   PATIENT:  Gonzales Faulkner  MRN:  [id number]  ADMIT DATE: 4/24/2023  TIME OF EVALUATION: 4/28/2023 10:46 AM  HOSPITAL STAY:   LOS: 4 days   CONSULTING PHYSICIAN: Jose Moreno MD   REASON FOR CONSULTATION: Melena    Subjective:    Patient seen and examined in follow-up. Patient reports feeling well. No BM since admission, but he notes he did have an episode of self-limited epistaxis yesterday evening. Denies any melena. EGD 4/26/23 notable for gastric AVM without bleeding or stigmata of bleeding. She reports she is not interested in colonoscopy colonoscopy as an inpatient or outpatient, which is reasonable given her medical comorbidities.     EGD 4/26/23:  LA grade B esophagitis  4 mm nonbleeding AVM in the gastric body, obliterated with APC  Prominent mucosal fold adjacent to ampulla without evidence of adenoma or ulceration    MEDICATIONS   SCHEDULED:  pantoprazole, 40 mg, QAM AC  torsemide, 20 mg, Daily  rivaroxaban, 15 mg, Daily with breakfast  ranolazine, 500 mg, BID  dilTIAZem, 120 mg, Daily  miconazole, , BID  mometasone-formoterol, 2 puff, BID  tiotropium, 2 puff, Daily  rosuvastatin, 20 mg, Daily  hydrALAZINE, 50 mg, 3 times per day  isosorbide mononitrate, 120 mg, Daily  dofetilide, 250 mcg, 2 times per day  sodium chloride flush, 10 mL, 2 times per day      FLUIDS/DRIPS:     sodium chloride      sodium chloride       PRNs: diphenhydrAMINE, 25 mg, Q6H PRN  sodium chloride, , PRN  albuterol, 2.5 mg, Q4H PRN  sodium chloride flush, 10 mL, PRN  sodium chloride, , PRN  ondansetron, 4 mg, Q4H PRN  polyethylene glycol, 17 g, Daily PRN  acetaminophen, 650 mg, Q4H PRN   Or  acetaminophen, 650 mg, Q4H PRN      ALLERGIES:    Allergies   Allergen Reactions    Morphine Rash     rash         PHYSICAL EXAM     Vitals:    04/28/23 0350 04/28/23 0554 04/28/23 0751 04/28/23 0832   BP: (!) 150/72 (!) 136/54 (!) 165/56    Pulse: 84  76 80   Resp: 15  21
[facility]                                               Progress note    Summary:   Marietta Murdock is being seen by nephrology for MARK. Admitted with anemia. Interval History  Seen at bedside. More awake and alert today   NPO for EGD  Urine output is pale yellow    /82  100% ON 4 l   UO not recorded. Na 140 k 4  Bicarb 28   BUN 64 Cr 1.7  Ca and phos wnl. Hgb 6.8    Plan:   - MARK has improved. - no acute electrolyte issues. - no overt uremic symptoms, making urine.   - off IVFS now because of SOB. - continue supportive care for MARK. - does not appear volume overloaded on exam, she is on her baseline O2 requirement. Neck veins flat. Diuretics per primary team/cardiology. Priti De La Torre MD  [facility]  Office: [phone]    Assessment:   MARK on CKD  Baseline creatinine appears to be around 1.4-1.6  Her creatinine peaked at 3.7 on 4/19/2023  She had had escalating doses of diuretics as an outpatient which have been stopped for the past week or so. Her creatinine is now trending down. Urine studies ordered as above  Checking bladder scan to ensure no urine retention   P/cr 0.3 g   UA 94 wbc , 4+ bacteremia no blood. Anemia  Positive Hemoccult in the stool  Undergoing evaluation  Receiving a unit of blood. COPD  Chronic respiratory failure on home oxygen of 4 L  She is not requiring any more oxygen above her baseline  She did not have significant pulmonary edema on her chest x-ray. CHF  Preserved EF 70%  G2DD        ROS:     Positives Listed Bold. All other remaining systems are negative. Constitutional:  fever, chills, weakness, weight change, fatigue,      Skin:  rash, pruritus, hair loss, bruising, dry skin, petechiae. Head, Face, Neck   headaches, swelling,  cervical adenopathy.      Respiratory: shortness of breath, cough, or wheezing  Cardiovascular: chest pain, palpitations, dizzy, edema  Gastrointestinal: nausea, vomiting, diarrhea,
[facility]                                               Progress note    Summary:   Nidia Daniels is being seen by nephrology for MARK. Admitted with anemia. Interval History  Seen at bedside. Awake and alert, no complaints   EGD showed esophagitis, AVM   No edema no SOB    /53  100% on 4 L  UO 1900 cc    Na 140 K 3.7  Bicarb 28 > 32  BUN 51 Cr 1.7 > 1.8  Ca and phos wnl. Hgb 6.8 > 8.1   > 235 > 231    Plan:   - CR overall stable. Expect some fluctuation with diuretics. - agree with diuretics. - Bun trending down and no uremic symptoms. - ok with discharge from renal aspect    Ok to resume aldactone and torsemide at discharge. Merry Rangel MD  [facility]  Office: [phone]    Assessment:   MARK on CKD  Baseline creatinine appears to be around 1.4-1.6  Her creatinine peaked at 3.7 on 4/19/2023  She had had escalating doses of diuretics as an outpatient which have been stopped for the past week or so. Her creatinine is now trending down. Urine studies ordered as above  Checking bladder scan to ensure no urine retention   P/cr 0.3 g   UA 94 wbc , 4+ bacteremia no blood. Anemia  Positive Hemoccult in the stool  Undergoing evaluation  Receiving a unit of blood. COPD  Chronic respiratory failure on home oxygen of 4 L  She is not requiring any more oxygen above her baseline  She did not have significant pulmonary edema on her chest x-ray. CHF  Preserved EF 70%  G2DD        ROS:     Positives Listed Bold. All other remaining systems are negative. Constitutional:  fever, chills, weakness, weight change, fatigue,      Skin:  rash, pruritus, hair loss, bruising, dry skin, petechiae. Head, Face, Neck   headaches, swelling,  cervical adenopathy.      Respiratory: shortness of breath, cough, or wheezing  Cardiovascular: chest pain, palpitations, dizzy, edema  Gastrointestinal: nausea, vomiting, diarrhea, constipation,belly pain    Yellow skin, blood in
Medications administered and monitored by CRNA, see anesthesia record.
Meds to beds (xarelto) delivered to pt room this shift.    Electronically signed by Teresa Vincent RN on 4/27/2023 at 5:41 PM
Occupational Therapy  Facility/Department: [address] PROGRESSIVE CARE  Occupational Therapy Daily Note    Name: Kamilah Rice  : 1945  MRN: [id number]  Date of Service: 2023    Discharge Recommendations:  24 hour supervision or assist, 2-3 sessions per week, Patient would benefit from continued therapy after discharge  OT Equipment Recommendations  ADL Assistive Devices: Toileting - 3-in-1 Commode   Kamilah Rice scored a 17/24 on the AM-PAC ADL Inpatient form. Current research shows that an AM-PAC score of 18 or greater is typically associated with a discharge to the patient's home setting. Based on the patient's AM-PAC score, and their current ADL deficits, it is recommended that the patient have 2-3 sessions per week of Occupational Therapy at d/c to increase the patient's independence. At this time, this patient demonstrates the endurance and safety to discharge home with [address] [place] [address] and a follow up treatment frequency of 2-3x/wk. Please see assessment section for further patient specific details. If patient discharges prior to next session this note will serve as a discharge summary. Please see below for the latest assessment towards goals. Patient Diagnosis(es): The primary encounter diagnosis was Anemia, unspecified type. Diagnoses of Shortness of breath, Chronic kidney disease, unspecified CKD stage, and Gastrointestinal hemorrhage with melena were also pertinent to this visit. Past Medical History:  has a past medical history of AAA (abdominal aortic aneurysm) ([address] AAA (abdominal aortic aneurysm) without rupture (HCC), Atrial fibrillation ([address] CAD (coronary artery disease), CHF (congestive heart failure) ([address] COPD (chronic obstructive pulmonary disease) ([facility] [address] History of blood clots, Hyperlipidemia, and Hypertension. Past Surgical History:  has a past surgical history that includes Colonoscopy (2007); Hysterectomy ();  Appendectomy (); bladder suspension; Cataract
Occupational Therapy  Facility/Department: [address] PROGRESSIVE CARE  Occupational Therapy Initial Assessment and Tentative D/C      Name: Marciano Philip  : 1945  MRN: [id number]  Date of Service: 2023    Discharge Recommendations: Marciano Philip scored a 16/24 on the AM-PAC ADL Inpatient form. Current research shows that an AM-PAC score of 17 or less is typically not associated with a discharge to the patient's home setting. If patient discharges prior to next session this note will serve as a discharge summary. Please see below for the latest assessment towards goals. Continue to assess pending progress, 24 hour supervision or assist, 2-3 sessions per week, Patient would benefit from continued therapy after discharge (home with 24hr HHOT and progression in therapy vs placement)  OT Equipment Recommendations  Equipment Needed: Yes  Mobility Devices: ADL Assistive Devices  ADL Assistive Devices: Toileting - 3-in-1 Commode       Patient Diagnosis(es): The primary encounter diagnosis was Anemia, unspecified type. Diagnoses of Shortness of breath, Chronic kidney disease, unspecified CKD stage, and Gastrointestinal hemorrhage with melena were also pertinent to this visit. Past Medical History:  has a past medical history of AAA (abdominal aortic aneurysm) ([facility] [address] AAA (abdominal aortic aneurysm) without rupture (HCC), Atrial fibrillation ([facility] [address] [facility] CAD (coronary artery disease), CHF (congestive heart failure) ([facility] [address] COPD (chronic obstructive pulmonary disease) ([facility] [address] History of blood clots, Hyperlipidemia, and Hypertension. Past Surgical History:  has a past surgical history that includes Colonoscopy (2007); Hysterectomy (); Appendectomy (); bladder suspension; Cataract removal; Tonsillectomy (as a child); tumor excision; Cholecystectomy (10/15/2013); Colonoscopy (2017); joint replacement (2013);  Abdominal aortic aneurysm repair; Cardiac surgery; and Cardiac catheterization (Right,
Order place for 25 mg benadryl PO telephone with read back. Dr. Diane Ricks will cosign.
PALLIATIVE MEDICINE PROGRESS NOTE     Patient name:Janae Bucio    QGE:[id number] :1945  Room/Bed:J9Y-4006/5280-01    LOS: 3 days        ASSESSMENT/RECOMMENDATIONS     69 y.o. female with acute bleeding, chronic diastolic heart failure, and debility. Life-threatening acute illness or unstable chronic illness addressed by Palliative Medicine:    Acute GI bleed -EGD on 2023, no significant bleeding identified. Hemoglobin stable today. Diastolic CHF - Continues to have admissions secondary to exacerbation. Reviewed disease process as well as medications and side effects. Discussed high risk for readmission which is going against patients goals of care. Will consult [facility] for discussion regarding admission to advanced cardiac program.  Debility -PT OT active, will return home per patient. Patient/Family Goals of Care :    23 - Discussed with patient and  at bedside. Patients goal remains to be in her home under the care of her , SNF/ECF is not an option. Mrs. Jung Bucio has expressed throughout several admissions that she does not wish to return to the hospital.  She struggles with outpatient follow up, has missed several appointments. Her focus would be to remain in the home with her  and avoid outpatient visits as well as hospitalizations. She does not wish to pursue any aggressive medical management that would inhibit her from living in her home. She does not wish to remain on long term life support. Her and her  are open to acute measures, however only if they are temporary in nature. We discussed code status at length, including but not limited to chest compressions, defibrillation, intubation, and ventilation. Patient does not wish to pursue chest compressions, however is okay with the additional options.   Information given on outpatient palliative care services as well as the advanced cardiac program.     23 -met with patient and  at bedside
PALLIATIVE MEDICINE PROGRESS NOTE     Patient name:Janae Jurado    WILLEM:[id number] :1945  Room/Bed:F0M-6318/5280-01    LOS: 4 days        ASSESSMENT/RECOMMENDATIONS     69 y.o. female with acute bleeding, chronic diastolic heart failure, and debility. Life-threatening acute illness or unstable chronic illness addressed by Palliative Medicine:    Acute GI bleed -EGD on 2023, no significant bleeding identified. Hemoglobin remains stable. Educated the patient on close f/u post discharge to monitor. Diastolic CHF - Increased lower ext edema today, likely secondary to increased volume throughout the hospitalization. Compression stocking ordered, educated on use at home. Patient and  decided against HOC, will refer to home based palliative care via [address] [place] [address] for in home support and symptom management. Will need to follow Hgb trends, optimization in HF is >8. Patient/Family Goals of Care :    23 - Discussed with patient and  at bedside. Patients goal remains to be in her home under the care of her , SNF/ECF is not an option. Mrs. Lionel Jurado has expressed throughout several admissions that she does not wish to return to the hospital.  She struggles with outpatient follow up, has missed several appointments. Her focus would be to remain in the home with her  and avoid outpatient visits as well as hospitalizations. She does not wish to pursue any aggressive medical management that would inhibit her from living in her home. She does not wish to remain on long term life support. Her and her  are open to acute measures, however only if they are temporary in nature. We discussed code status at length, including but not limited to chest compressions, defibrillation, intubation, and ventilation. Patient does not wish to pursue chest compressions, however is okay with the additional options.   Information given on outpatient palliative care services as well as
Patient bladder scanned twice. Once around midnight  - 0mL. Patient had just had a urinary incontinent episode in the chair.      Bladder scanned again at 4:30 - 93mL
Physical Therapy  Facility/Department: [address] PROGRESSIVE CARE  Physical Therapy treatment session    Name: Martin Galloway  : 1945  MRN: [id number]  Date of Service: 2023    Discharge Recommendations:  24 hour supervision or assist, Home with Home health PT, Patient would benefit from continued therapy after discharge (initial 24 hour assist)   PT Equipment Recommendations  Equipment Needed: No      Patient Diagnosis(es): The primary encounter diagnosis was Anemia, unspecified type. Diagnoses of Shortness of breath, Chronic kidney disease, unspecified CKD stage, and Gastrointestinal hemorrhage with melena were also pertinent to this visit. Past Medical History:  has a past medical history of AAA (abdominal aortic aneurysm) ([facility] [address] AAA (abdominal aortic aneurysm) without rupture (HCC), Atrial fibrillation ([facility] [address] CAD (coronary artery disease), CHF (congestive heart failure) ([facility] [address] COPD (chronic obstructive pulmonary disease) ([facility] [address] History of blood clots, Hyperlipidemia, and Hypertension. Past Surgical History:  has a past surgical history that includes Colonoscopy (2007); Hysterectomy (); Appendectomy (); bladder suspension; Cataract removal; Tonsillectomy (as a child); tumor excision; Cholecystectomy (10/15/2013); Colonoscopy (2017); joint replacement (2013); Abdominal aortic aneurysm repair; Cardiac surgery; Cardiac catheterization (Right, 2022); and Upper gastrointestinal endoscopy (N/A, 2023). Assessment   Body Structures, Functions, Activity Limitations Requiring Skilled Therapeutic Intervention: Decreased functional mobility ; Decreased strength;Decreased endurance;Decreased balance  Assessment: 68 y/o female admit 2023 with GI Bleed, Anemia,Sinus Bradycardia. PMH as noted including CAD, AAA Repair, A-Fib,CHF, COPD, Pleural Effusion.   PTA pt living with  in 1 story home (no basement) with 1 step to enter; pt reports independent self care and amb
Physical Therapy  Facility/Department: [address] PROGRESSIVE CARE  Physical Therapy Treatment Note    Name: Lluvia Galeas  : 1945  MRN: [id number]  Date of Service: 2023    Discharge Recommendations:  Continue to assess pending progress (Anticipate adequate assist/support for d/c home. )   PT Equipment Recommendations  Other: Will monitor for potential equipt needs. Current Am-Pac ; anticipate d/c home. Assessment   Body Structures, Functions, Activity Limitations Requiring Skilled Therapeutic Intervention: Decreased functional mobility ; Decreased strength;Decreased endurance;Decreased balance  Assessment: 68 y/o female admit 2023 with GI Bleed, Anemia,Sinus Bradycardia. PMH as noted including CAD, AAA Repair, A-Fib,CHF, COPD, Pleural Effusion. PTA pt living with  in 1 story home (no basement) with 1 step to enter; pt reports independent self care and amb only short distances within home. Pt currently requiring O2 3L (3-4L baseline). Pt saad oob, amb short distances within hospital room setting with [facility]; cues for safe transitional mvts. Endurance limited; sats remain at least 90%. At this time, anticipate adequate progress for d/c home. Will cont to monitor pt's progress. Therapy Prognosis: Fair  History: 68 y/o female admit 2023 with GI Bleed, Anemia,Sinus Bradycardia. PMH as noted including CAD, AAA Repair, A-Fib,CHF, COPD, Pleural Effusion. Exam: See above. Clinical Presentation: See above. Barriers to Learning: None. Requires PT Follow-Up: Yes  Activity Tolerance  Activity Tolerance: Patient limited by endurance  Activity Tolerance Comments: Pt currently requiring O2 3L (3-4L baseline). Pt saad oob, amb short distances within hospital room setting with [facility]; cues for safe transitional mvts. Endurance limited; sats remain at least 90%.      Plan   Physcial Therapy Plan  General Plan: 3-5 times per week  Current Treatment Recommendations:
Pt being discharged today. RN applied knee high EDUIN hose to pt prior to D/C as well as provided additional pair to send home with patient.  expressing gratitude.   Electronically signed by Manuel Berg RN on 4/28/2023 at 11:56 AM
Pt is alert and oriented and denies pain at this time, vital signs stable on 4l nasal canula.  Report called to Jesusita REES, will transfer Pt to room 5253
Pt to PACU from Endo, Pt is alert and oriented and denies pain at this time, vital signs stable on 4l nasal canula oxygen.
RN notified pt and  that EGD was delayed until this afternoon d/t an emergency situation. Pt began to yell at RN that \"Noooo, I'm hungry, I don't want to wait\". RN acknowledged that she is aware that pt is hungry and she has the right to refuse her test if she would prefer to eat but then we would have any answers to her bleeding situation. Pt also informed that it's not appropriate to yell at RN over a situation that RN has no control over. Pt grudgingly stated she would wait for her test this afternoon. Hgb 6.8 this morning. Dr. Raul Goel and GI notified. Order for PRBC 1 unit received and is currently infusing. Electronically signed by Regan Huerta RN on 4/26/23 at 11:46 AM EDT    IV Lasix given- purewick not working correctly. Pt incont x2 of very large amounts of urine. Electronically signed by Regan Huerta RN on 4/26/23 at 2:24 PM EDT    Pt returned from PACU s/p EGD. Pt alert, oriented and requesting food.     at bedside- stated he was updated by MD.  Electronically signed by Regan Huerta RN on 4/26/23 at 6:24 PM EDT
RN placed call at this time to Dr. Martita Curtis to determine if EGD is being done today and if not can a diet be ordered. Awaiting response. Electronically signed by Hiro Kumar RN on 4/25/23 at 3:19 PM EDT    Pt up in chair at this time. Refused to go back to bed. Nephro notified that bladder scan and urine samples ordered earlier in shift can not be obtained until pt returns to bed. Pt received 1u PRBC this shift- awaiting results of post H&H. No BM today and no other outward signs of bleeding. Pt noted to intermittently go in/out Afib. HR noted to be in 30s at times. Seen by Dr. Jose Conte who does not appear concerned with HR. Pt and  aware that pt will be NPO at midnight with possible EGD tomorrow.    Electronically signed by Hiro Kumar RN on 4/25/23 at 6:33 PM EDT
Sent a message via cVidya to Dr. Anjana Sosa. Patient is requesting another dose of benadryl for itching that is keeping her up.
Physcial Therapy Plan  General Plan: 3-5 times per week  Current Treatment Recommendations: Strengthening, Therapeutic activities, Functional mobility training, Transfer training, Gait training, Endurance training, Safety education & training, Patient/Caregiver education & training  Safety Devices  Type of Devices: Call light within reach, Left in chair, Nurse notified     Restrictions  Restrictions/Precautions  Restrictions/Precautions: Fall Risk  Position Activity Restriction  Other position/activity restrictions: O2 3L via NC (baseline 3-4L). NPO except Ice Chips. Currently NS flush following Blood Transfusion (per nurse, ok oob as saad). Contact Precautions : MRSA. Subjective   General  Chart Reviewed: Yes  Patient assessed for rehabilitation services?: Yes  Additional Pertinent Hx: 68 y/o female admit 4/24/2023 with GI Bleed, Anemia,Sinus Bradycardia. PMH as noted including CAD, AAA Repair, A-Fib,CHF, COPD, Pleural Effusion. Family / Caregiver Present: Yes ()  Referring Practitioner: Dr. Job Velez. Follows Commands: Within Functional Limits  Subjective  Subjective: Pt/ agreeable to PT Eval/Rx. Social/Functional History  Social/Functional History  Lives With: Spouse  Type of Home: House  Home Layout: One level (1 story, no basement.)  Home Access: Stairs to enter without rails (1 small step to enter.)  Bathroom Shower/Tub:  (Walk-in Tub.)  Bathroom Toilet: Standard  Bathroom Equipment: Built-in shower seat  Bathroom Accessibility: Accessible  Home Equipment: Nicholette Hopes, 4 wheeled, Oxygen, Wheelchair-manual (O2 3-4L pta.)  ADL Assistance: Independent  Homemaking Assistance:  (Sign other takes care of homemaking needs.)  Ambulation Assistance: Independent (Amb short distances within home only.   Use of Walker prn.)  Transfer Assistance: Independent  Active : No ( drives.)  Occupation: Retired  Additional Comments:  provides assist/support for d/c
description, consistent with outlet bleeding from hemorrhoids. No evidence of ongoing bleeding. Given significant medical comorbidities, will defer endoscopic evaluation unless patient demonstrates ongoing bleeding. NYHA class IV CHF: BNP elevated, with recent evaluation with Dr. Saurabh Quiroga, suggesting patient is decompensated. Cardiology consulted, will defer management to cardiology. Atrial fibrillation on Xarelto: Ok to resume Xarelto from GI standpoint. CAD s/p PCI on Plavix: Plavix currently on hold 2/2 #1. Ok to resume Plavix from GI standpoint. COPD: Severe, on chronic oxygen. Will defer management to primary team.  MARK on CKD: nephrology consulted, will defer management. RECOMMENDATIONS:    -Okay to resume antiplatelet therapy from GI standpoint  -Monitor stool output and H&H  -No plan for further GI evaluation endoscopically at this time. If you have any questions or need any further information, please feel free to contact anyone on our consult team.  Thank you for allowing us to participate in the care of Ponce Segal. Diego Padilla.  [address]

## 2023-04-28 NOTE — DISCHARGE INSTR - COC
Continuity of Care Form    Patient Name: Nidia Daniels   :  1945  MRN:  [id number]    [facility] date:  2023  Discharge date:  2023    Code Status Order: Limited   Advance Directives:   [facility] Documentation       Date/Time Healthcare Directive Type of Healthcare Directive Copy in [facility] [address] Agent's Name Healthcare Agent's Phone Number    [phone] No, patient does not have an advance directive for healthcare treatment -- -- -- -- --            Admitting Physician:  Eber Spurling, MD  PCP: Javier Fitzgerald MD    Discharging Nurse: [facility] Unit/Room#: O2K-4725/1648-91  Discharging Unit Phone Number: [phone]    Emergency Contact:   Extended Emergency Contact Information  Primary Emergency Contact: Linda Perez  Address: [address] [facility], [address] [place] [address] Phone: [phone]  Relation: Spouse  Secondary Emergency Contact: Esteban Miranda [address] Phone: [phone]  Relation: Child    Past Surgical History:  Past Surgical History:   Procedure Laterality Date    ABDOMINAL AORTIC ANEURYSM REPAIR      Endovascular abdominal AA    APPENDECTOMY      incidental    BLADDER SUSPENSION      CARDIAC CATHETERIZATION Right 2022    Cardiomems PA sensor device implant Left PA    CARDIAC SURGERY      CATARACT REMOVAL      CHOLECYSTECTOMY  10/15/2013    COLONOSCOPY  2007    dr Paco Long and check in 5 years. COLONOSCOPY  2017    ok dr arndt, repeat 5 years    HYSTERECTOMY ([facility]      for benign tumor.  just the uterus    JOINT REPLACEMENT  2013    right knee replacement    TONSILLECTOMY  as a child    TUMOR EXCISION      benign behind right ear about     UPPER GASTROINTESTINAL ENDOSCOPY N/A 2023    EGD CONTROL HEMORRHAGE WITH APC TO AVM performed by Charbel Hurtado MD at [facility] ENDOSCOPY       Immunization History:   Immunization

## 2023-04-28 NOTE — CARE COORDINATION
CASE MANAGEMENT DISCHARGE SUMMARY:    DISCHARGE DATE: 4/28/2023    DISCHARGED TO: home     Discharging to Facility/ Agency   Name:  [facility] care    Address: [address] [place] [address]., [facility] [place] [address]  Phone: [phone]  Fax: [phone]      TRANSPORTATION: via spouse             TIME: TBD by patient and bedside RN    COMMENTS: Patient, patient's spouse (Linda), bedside RN, and home healthcare company aware of discharge plan and timeline.     Electronically signed by Tae Begum RN on 4/28/2023 at 11:45 AM

## 2023-05-01 ENCOUNTER — CLINICAL DOCUMENTATION ONLY (OUTPATIENT)
Facility: CLINIC | Age: 78
End: 2023-05-01

## 2023-05-02 ENCOUNTER — CARE COORDINATION (OUTPATIENT)
Dept: CASE MANAGEMENT | Age: 78
End: 2023-05-02

## 2023-05-02 DIAGNOSIS — K92.2 ACUTE GI BLEEDING: Primary | ICD-10-CM

## 2023-05-02 PROCEDURE — 1111F DSCHRG MED/CURRENT MED MERGE: CPT

## 2023-05-02 NOTE — CARE COORDINATION
Yes  How are you going to get to your appointment?: Car - family or friend to transport  Post Acute Services: [address] (Comment: [facility])  Do you have support at home?: Partner/Spouse/SO  Do you feel like you have everything you need to keep you well at home?: Yes  Care Transitions Interventions         Discussed follow-up appointments. If no appointment was previously scheduled, appointment scheduling offered: Yes. Is follow up appointment scheduled within 7 days of discharge? Yes. Follow Up  Future Appointments   Date Time Provider Kiana Puri   5/5/2023  4:00 PM Talon Khanna MD [place] [facility]   5/18/2023  9:20 AM Dain Cabrera [facility]   6/21/2023  1:00 PM Michael Mccullough MD [facility]   7/11/2023  1:40 PM Aniyah Cleaning MD [address] Transition Nurse provided contact information.     Plan for next call:  SOB - bleeding - [address] RN BSN  Care Transition Nurse  [phone]

## 2023-05-03 ENCOUNTER — NURSE ONLY (OUTPATIENT)
Dept: CARDIOLOGY | Age: 78
End: 2023-05-03

## 2023-05-03 DIAGNOSIS — I50.22 CHRONIC SYSTOLIC (CONGESTIVE) HEART FAILURE (HCC): Primary | ICD-10-CM

## 2023-05-03 NOTE — PROGRESS NOTES
I have reviewed, interpreted and responded to the remote patient monitoring date via telephone, [employer] benji and/or Direct Call messaging at least weekly for the last 30 days. Goal PAD : 20  Patient has not maintained goal, she has been seen frequently by Dr. Juliette Burger with medication adjustments and attempts to keep euvolemic. See previous encounters.

## 2023-05-05 ENCOUNTER — CARE COORDINATION (OUTPATIENT)
Dept: CASE MANAGEMENT | Age: 78
End: 2023-05-05

## 2023-05-05 ENCOUNTER — OFFICE VISIT (OUTPATIENT)
Dept: CARDIOLOGY CLINIC | Age: 78
End: 2023-05-05
Payer: MEDICARE

## 2023-05-05 VITALS
WEIGHT: 243 LBS | DIASTOLIC BLOOD PRESSURE: 82 MMHG | SYSTOLIC BLOOD PRESSURE: 136 MMHG | BODY MASS INDEX: 41.48 KG/M2 | OXYGEN SATURATION: 98 % | HEIGHT: 64 IN | HEART RATE: 70 BPM

## 2023-05-05 DIAGNOSIS — J44.9 COPD, SEVERE (HCC): ICD-10-CM

## 2023-05-05 DIAGNOSIS — I71.40 ABDOMINAL AORTIC ANEURYSM (AAA) WITHOUT RUPTURE, UNSPECIFIED PART (HCC): ICD-10-CM

## 2023-05-05 DIAGNOSIS — G47.33 OSA (OBSTRUCTIVE SLEEP APNEA): ICD-10-CM

## 2023-05-05 DIAGNOSIS — I48.0 PAF (PAROXYSMAL ATRIAL FIBRILLATION) (HCC): ICD-10-CM

## 2023-05-05 DIAGNOSIS — I50.32 CHRONIC DIASTOLIC HEART FAILURE (HCC): Primary | ICD-10-CM

## 2023-05-05 DIAGNOSIS — I10 ESSENTIAL HYPERTENSION: ICD-10-CM

## 2023-05-05 DIAGNOSIS — Z87.19 HISTORY OF GI BLEED: ICD-10-CM

## 2023-05-05 DIAGNOSIS — D64.9 CHRONIC ANEMIA: ICD-10-CM

## 2023-05-05 DIAGNOSIS — I25.10 CORONARY ARTERY DISEASE INVOLVING NATIVE CORONARY ARTERY OF NATIVE HEART WITHOUT ANGINA PECTORIS: ICD-10-CM

## 2023-05-05 PROCEDURE — 99214 OFFICE O/P EST MOD 30 MIN: CPT | Performed by: INTERNAL MEDICINE

## 2023-05-05 PROCEDURE — 1111F DSCHRG MED/CURRENT MED MERGE: CPT | Performed by: INTERNAL MEDICINE

## 2023-05-05 PROCEDURE — 3023F SPIROM DOC REV: CPT | Performed by: INTERNAL MEDICINE

## 2023-05-05 PROCEDURE — 1090F PRES/ABSN URINE INCON ASSESS: CPT | Performed by: INTERNAL MEDICINE

## 2023-05-05 PROCEDURE — 93000 ELECTROCARDIOGRAM COMPLETE: CPT | Performed by: INTERNAL MEDICINE

## 2023-05-05 PROCEDURE — G8399 PT W/DXA RESULTS DOCUMENT: HCPCS | Performed by: INTERNAL MEDICINE

## 2023-05-05 PROCEDURE — 3078F DIAST BP <80 MM HG: CPT | Performed by: INTERNAL MEDICINE

## 2023-05-05 PROCEDURE — 3074F SYST BP LT 130 MM HG: CPT | Performed by: INTERNAL MEDICINE

## 2023-05-05 PROCEDURE — G8417 CALC BMI ABV UP PARAM F/U: HCPCS | Performed by: INTERNAL MEDICINE

## 2023-05-05 PROCEDURE — 1123F ACP DISCUSS/DSCN MKR DOCD: CPT | Performed by: INTERNAL MEDICINE

## 2023-05-05 PROCEDURE — 1036F TOBACCO NON-USER: CPT | Performed by: INTERNAL MEDICINE

## 2023-05-05 PROCEDURE — G8427 DOCREV CUR MEDS BY ELIG CLIN: HCPCS | Performed by: INTERNAL MEDICINE

## 2023-05-05 RX ORDER — TORSEMIDE 20 MG/1
40 TABLET ORAL DAILY
Qty: 90 TABLET | Refills: 3
Start: 2023-05-05

## 2023-05-05 NOTE — CARE COORDINATION
[facility] Care Transitions Follow Up Call    Patient Current Location:  Home: [address] [facility] Coordinator contacted the patient by telephone to follow up after admission on 2023. Verified name and  with patient as identifiers. Patient: Julia Boyce  Patient : 1945   MRN: [id number]  Reason for Admission: Anemia  Discharge Date: 23 RARS: Readmission Risk Score: 27.1      Needs to be reviewed by the provider   Additional needs identified to be addressed with provider: No  none             Method of communication with provider:   Patient reports she is doing good. Denies cp, cough, fever, chills, palpitations, bleeding or fatigue. Sob is at baseline and swelling in her feet. Patient isn't wearing compression hose. Home PT is active. O2 @ 4 lpm. Appetite and fluid intake is good. Patient didn't have any BP, Wt or O2 sat readings to give. She stated her  always writes them down. He wasn't available. No questions, needs or concerns. Will continue to follow. Addressed changes since last contact:  none  Discussed follow-up appointments. If no appointment was previously scheduled, appointment scheduling offered: na.   Is follow up appointment scheduled within 7 days of discharge? Yes. Follow Up  Future Appointments   Date Time Provider Kiana Puri   2023  4:00 PM Yaneth Kaplan MD [facility]   2023  3:40 PM Hemant Nagy MD [facility]   2023  9:20 AM Salima Ornelas  [facility]   2023  1:00 PM Nisha Rueda MD [facility]   2023  1:40 PM Hemant Nagy MD [facility]     [id number] Kiana Gayle follow up appointment(s):     LPN Care Coordinator reviewed medical action plan and red flags with patient and discussed any barriers to care and/or understanding of plan of care after discharge.  Discussed appropriate site of care based on symptoms and resources available to patient including:

## 2023-05-08 ENCOUNTER — HOSPITAL ENCOUNTER (INPATIENT)
Age: 78
LOS: 3 days | Discharge: HOME | DRG: 378 | End: 2023-05-11
Attending: EMERGENCY MEDICINE | Admitting: STUDENT IN AN ORGANIZED HEALTH CARE EDUCATION/TRAINING PROGRAM
Payer: MEDICARE

## 2023-05-08 ENCOUNTER — TELEPHONE (OUTPATIENT)
Dept: CARDIOLOGY CLINIC | Age: 78
End: 2023-05-08

## 2023-05-08 ENCOUNTER — APPOINTMENT (OUTPATIENT)
Dept: GENERAL RADIOLOGY | Age: 78
DRG: 378 | End: 2023-05-08
Payer: MEDICARE

## 2023-05-08 DIAGNOSIS — K92.2 UPPER GI BLEED: Primary | ICD-10-CM

## 2023-05-08 DIAGNOSIS — R19.5 STOOL GUAIAC POSITIVE: ICD-10-CM

## 2023-05-08 DIAGNOSIS — D64.9 SYMPTOMATIC ANEMIA: ICD-10-CM

## 2023-05-08 LAB
ABO + RH BLD: NORMAL
ALBUMIN SERPL-MCNC: 3.5 G/DL (ref 3.4–5)
ALBUMIN/GLOB SERPL: 1.5 {RATIO} (ref 1.1–2.2)
ALP SERPL-CCNC: 49 U/L (ref 40–129)
ALT SERPL-CCNC: 9 U/L (ref 10–40)
ANION GAP SERPL CALCULATED.3IONS-SCNC: 11 MMOL/L (ref 3–16)
APTT BLD: 35 SEC (ref 22.7–35.9)
AST SERPL-CCNC: 16 U/L (ref 15–37)
BASOPHILS # BLD: 0.1 K/UL (ref 0–0.2)
BASOPHILS NFR BLD: 0.6 %
BILIRUB SERPL-MCNC: 0.3 MG/DL (ref 0–1)
BLD GP AB SCN SERPL QL: NORMAL
BLOOD BANK DISPENSE STATUS: NORMAL
BLOOD BANK PRODUCT CODE: NORMAL
BPU ID: NORMAL
BUN SERPL-MCNC: 35 MG/DL (ref 7–20)
CALCIUM SERPL-MCNC: 9 MG/DL (ref 8.3–10.6)
CHLORIDE SERPL-SCNC: 98 MMOL/L (ref 99–110)
CO2 SERPL-SCNC: 30 MMOL/L (ref 21–32)
CREAT SERPL-MCNC: 1.7 MG/DL (ref 0.6–1.2)
DEPRECATED RDW RBC AUTO: 24.2 % (ref 12.4–15.4)
DESCRIPTION BLOOD BANK: NORMAL
EOSINOPHIL # BLD: 0.3 K/UL (ref 0–0.6)
EOSINOPHIL NFR BLD: 3.1 %
GFR SERPLBLD CREATININE-BSD FMLA CKD-EPI: 31 ML/MIN/{1.73_M2}
GLUCOSE SERPL-MCNC: 113 MG/DL (ref 70–99)
HCT VFR BLD AUTO: 19.1 % (ref 36–48)
HCT VFR BLD AUTO: 19.8 % (ref 36–48)
HEMOCCULT STL QL: ABNORMAL
HGB BLD-MCNC: 6.2 G/DL (ref 12–16)
IMMATURE RETIC FRACT: 0.69 (ref 0.21–0.37)
INR PPP: 2.86 (ref 0.84–1.16)
IRON SATN MFR SERPL: 24 % (ref 15–50)
IRON SERPL-MCNC: 71 UG/DL (ref 37–145)
LYMPHOCYTES # BLD: 0.7 K/UL (ref 1–5.1)
LYMPHOCYTES NFR BLD: 7.7 %
MCH RBC QN AUTO: 28.7 PG (ref 26–34)
MCHC RBC AUTO-ENTMCNC: 31.2 G/DL (ref 31–36)
MCV RBC AUTO: 92.1 FL (ref 80–100)
MONOCYTES # BLD: 0.8 K/UL (ref 0–1.3)
MONOCYTES NFR BLD: 8.1 %
NEUTROPHILS # BLD: 7.4 K/UL (ref 1.7–7.7)
NEUTROPHILS NFR BLD: 80.5 %
PLATELET # BLD AUTO: 215 K/UL (ref 135–450)
PMV BLD AUTO: 8.8 FL (ref 5–10.5)
POTASSIUM SERPL-SCNC: 3.7 MMOL/L (ref 3.5–5.1)
PROT SERPL-MCNC: 5.8 G/DL (ref 6.4–8.2)
PROTHROMBIN TIME: 29.8 SEC (ref 11.5–14.8)
RBC # BLD AUTO: 2.15 M/UL (ref 4–5.2)
RETICS # AUTO: 0.18 M/UL (ref 0.02–0.1)
RETICS/RBC NFR AUTO: 8.64 % (ref 0.5–2.18)
SODIUM SERPL-SCNC: 139 MMOL/L (ref 136–145)
TIBC SERPL-MCNC: 291 UG/DL (ref 260–445)
WBC # BLD AUTO: 9.2 K/UL (ref 4–11)

## 2023-05-08 PROCEDURE — 86923 COMPATIBILITY TEST ELECTRIC: CPT

## 2023-05-08 PROCEDURE — 1200000000 HC SEMI PRIVATE

## 2023-05-08 PROCEDURE — 82270 OCCULT BLOOD FECES: CPT

## 2023-05-08 PROCEDURE — 99285 EMERGENCY DEPT VISIT HI MDM: CPT

## 2023-05-08 PROCEDURE — 83540 ASSAY OF IRON: CPT

## 2023-05-08 PROCEDURE — 36569 INSJ PICC 5 YR+ W/O IMAGING: CPT

## 2023-05-08 PROCEDURE — 86920 COMPATIBILITY TEST SPIN: CPT

## 2023-05-08 PROCEDURE — 30233N1 TRANSFUSION OF NONAUTOLOGOUS RED BLOOD CELLS INTO PERIPHERAL VEIN, PERCUTANEOUS APPROACH: ICD-10-PCS | Performed by: STUDENT IN AN ORGANIZED HEALTH CARE EDUCATION/TRAINING PROGRAM

## 2023-05-08 PROCEDURE — 86850 RBC ANTIBODY SCREEN: CPT

## 2023-05-08 PROCEDURE — 71045 X-RAY EXAM CHEST 1 VIEW: CPT

## 2023-05-08 PROCEDURE — 85025 COMPLETE CBC W/AUTO DIFF WBC: CPT

## 2023-05-08 PROCEDURE — 83550 IRON BINDING TEST: CPT

## 2023-05-08 PROCEDURE — 80053 COMPREHEN METABOLIC PANEL: CPT

## 2023-05-08 PROCEDURE — 85045 AUTOMATED RETICULOCYTE COUNT: CPT

## 2023-05-08 PROCEDURE — 86900 BLOOD TYPING SEROLOGIC ABO: CPT

## 2023-05-08 PROCEDURE — 85610 PROTHROMBIN TIME: CPT

## 2023-05-08 PROCEDURE — P9016 RBC LEUKOCYTES REDUCED: HCPCS

## 2023-05-08 PROCEDURE — 86901 BLOOD TYPING SEROLOGIC RH(D): CPT

## 2023-05-08 PROCEDURE — 85730 THROMBOPLASTIN TIME PARTIAL: CPT

## 2023-05-08 RX ORDER — SODIUM CHLORIDE 9 MG/ML
INJECTION, SOLUTION INTRAVENOUS PRN
Status: DISCONTINUED | OUTPATIENT
Start: 2023-05-08 | End: 2023-05-11 | Stop reason: HOSPADM

## 2023-05-08 RX ORDER — SODIUM CHLORIDE 0.9 % (FLUSH) 0.9 %
5-40 SYRINGE (ML) INJECTION PRN
Status: DISCONTINUED | OUTPATIENT
Start: 2023-05-08 | End: 2023-05-10 | Stop reason: SDUPTHER

## 2023-05-08 RX ORDER — SODIUM CHLORIDE 9 MG/ML
25 INJECTION, SOLUTION INTRAVENOUS PRN
Status: DISCONTINUED | OUTPATIENT
Start: 2023-05-08 | End: 2023-05-11 | Stop reason: HOSPADM

## 2023-05-08 RX ORDER — SODIUM CHLORIDE 0.9 % (FLUSH) 0.9 %
5-40 SYRINGE (ML) INJECTION EVERY 12 HOURS SCHEDULED
Status: DISCONTINUED | OUTPATIENT
Start: 2023-05-08 | End: 2023-05-10 | Stop reason: SDUPTHER

## 2023-05-08 RX ORDER — LIDOCAINE HYDROCHLORIDE 10 MG/ML
5 INJECTION, SOLUTION EPIDURAL; INFILTRATION; INTRACAUDAL; PERINEURAL ONCE
Status: DISCONTINUED | OUTPATIENT
Start: 2023-05-08 | End: 2023-05-11 | Stop reason: HOSPADM

## 2023-05-08 NOTE — CONSENT
Informed Consent for Blood Component Transfusion Note    I have discussed with the patient the rationale for blood component transfusion; its benefits in treating or preventing fatigue, organ damage, or death; and its risk which includes mild transfusion reactions, rare risk of blood borne infection, or more serious but rare reactions. I have discussed the alternatives to transfusion, including the risk and consequences of not receiving transfusion. The patient had an opportunity to ask questions and had agreed to proceed with transfusion of blood components.     Electronically signed by Phyllis Sherman PA-C on 5/8/23 at 7:39 PM EDT

## 2023-05-08 NOTE — ED NOTES
Attempted to place an IV in the left [facility], but attempt unsuccessful. Notified ED charge RN for an attempt.       Criselda Jolly RN  05/08/23 5422

## 2023-05-08 NOTE — ED PROVIDER NOTES
[facility]        Pt Name: Rigo Kmible  MRN: [id number]  [place] 1945  Date of evaluation: 5/8/2023  Provider: Baldev Calabrese PA-C  PCP: Oswald Alvarez MD  Note Started: 7:39 PM EDT 5/8/23       I have seen and evaluated this patient with my supervising physician Nataliya Darby, [address] [facility]       Chief Complaint   Patient presents with    Other     Low h/h today. Sob, rectal bleed? Fatigued        HISTORY OF PRESENT ILLNESS: 1 or more Elements     History From: Patient and     Limitations to history : None    Rigo Kimble is a 69 y.o. female who presents to the emergency department for evaluation of symptomatic anemia. This patient was previously seen by healthcare provider. The patient had laboratory studies today. Hemoglobin 6.1. Patient states over the past several days she has had progressive weakness and fatigue. She has had previous transfusion on 4/24/2023. Patient states received 3 units. Patient underwent EGD on 4/26/2023 showing LA grade B esophagitis, 4 mm nonbleeding AVM in the gastric body near the inscisura which was obliterated with APC. I expect it was concluded that the patient had AVM bleed and was cauterized per the patient's and 's statement to me today. Patient without abdominal pain complaint. She continues on iron. States her stool is dark or black in color. She has seen no blood. She has had no nausea or emesis. Patient had routine labs as she will see cardiology Dr. Cindy Pastor this Friday. Those labs revealed low hemoglobin and referred to ED for further evaluation. Patient with known COPD and currently on 2 L nasal cannula, her baseline. No complaint shortness of breath or chest pain. Nursing Notes were all reviewed and agreed with or any disagreements were addressed in the HPI.     REVIEW OF SYSTEMS :      Review of Systems    Positives and Pertinent negatives

## 2023-05-08 NOTE — TELEPHONE ENCOUNTER
Received a call from lab that patients Hgb is 6.1 and Hct is 19.5. Please advise on further recommendations.

## 2023-05-08 NOTE — TELEPHONE ENCOUNTER
Labs reviewed. Hemoglobin has again dropped to 6.1 g    I talked to Dara and have advised her to go to emergency room.   She should get repeat CBC and if her hemoglobin is again less than 7, she will need blood transfusion and very likely readmission

## 2023-05-09 LAB
ANION GAP SERPL CALCULATED.3IONS-SCNC: 9 MMOL/L (ref 3–16)
BASOPHILS # BLD: 0 K/UL (ref 0–0.2)
BASOPHILS NFR BLD: 0.3 %
BLOOD BANK DISPENSE STATUS: NORMAL
BLOOD BANK DISPENSE STATUS: NORMAL
BLOOD BANK PRODUCT CODE: NORMAL
BLOOD BANK PRODUCT CODE: NORMAL
BPU ID: NORMAL
BPU ID: NORMAL
BUN SERPL-MCNC: 36 MG/DL (ref 7–20)
CALCIUM SERPL-MCNC: 8.6 MG/DL (ref 8.3–10.6)
CHLORIDE SERPL-SCNC: 100 MMOL/L (ref 99–110)
CO2 SERPL-SCNC: 32 MMOL/L (ref 21–32)
CREAT SERPL-MCNC: 1.6 MG/DL (ref 0.6–1.2)
DEPRECATED RDW RBC AUTO: 22.4 % (ref 12.4–15.4)
DESCRIPTION BLOOD BANK: NORMAL
DESCRIPTION BLOOD BANK: NORMAL
EOSINOPHIL # BLD: 0.4 K/UL (ref 0–0.6)
EOSINOPHIL NFR BLD: 3.9 %
FERRITIN SERPL IA-MCNC: 160.1 NG/ML (ref 15–150)
FOLATE SERPL-MCNC: >20 NG/ML (ref 4.78–24.2)
GFR SERPLBLD CREATININE-BSD FMLA CKD-EPI: 33 ML/MIN/{1.73_M2}
GLUCOSE SERPL-MCNC: 108 MG/DL (ref 70–99)
HCT VFR BLD AUTO: 20.6 % (ref 36–48)
HCT VFR BLD AUTO: 21.4 % (ref 36–48)
HGB BLD-MCNC: 6.3 G/DL (ref 12–16)
HGB BLD-MCNC: 6.9 G/DL (ref 12–16)
LYMPHOCYTES # BLD: 0.8 K/UL (ref 1–5.1)
LYMPHOCYTES NFR BLD: 6.7 %
MAGNESIUM SERPL-MCNC: 2.1 MG/DL (ref 1.8–2.4)
MCH RBC QN AUTO: 27.9 PG (ref 26–34)
MCHC RBC AUTO-ENTMCNC: 30.9 G/DL (ref 31–36)
MCV RBC AUTO: 90.4 FL (ref 80–100)
MONOCYTES # BLD: 0.8 K/UL (ref 0–1.3)
MONOCYTES NFR BLD: 6.7 %
NEUTROPHILS # BLD: 9.6 K/UL (ref 1.7–7.7)
NEUTROPHILS NFR BLD: 82.4 %
PLATELET # BLD AUTO: 193 K/UL (ref 135–450)
PMV BLD AUTO: 8.8 FL (ref 5–10.5)
POTASSIUM SERPL-SCNC: 3.4 MMOL/L (ref 3.5–5.1)
RBC # BLD AUTO: 2.27 M/UL (ref 4–5.2)
SODIUM SERPL-SCNC: 141 MMOL/L (ref 136–145)
VIT B12 SERPL-MCNC: 1467 PG/ML (ref 211–911)
WBC # BLD AUTO: 11.6 K/UL (ref 4–11)

## 2023-05-09 PROCEDURE — 2580000003 HC RX 258: Performed by: PHYSICIAN ASSISTANT

## 2023-05-09 PROCEDURE — 6370000000 HC RX 637 (ALT 250 FOR IP): Performed by: STUDENT IN AN ORGANIZED HEALTH CARE EDUCATION/TRAINING PROGRAM

## 2023-05-09 PROCEDURE — 36415 COLL VENOUS BLD VENIPUNCTURE: CPT

## 2023-05-09 PROCEDURE — 2580000003 HC RX 258: Performed by: STUDENT IN AN ORGANIZED HEALTH CARE EDUCATION/TRAINING PROGRAM

## 2023-05-09 PROCEDURE — 94760 N-INVAS EAR/PLS OXIMETRY 1: CPT

## 2023-05-09 PROCEDURE — 85018 HEMOGLOBIN: CPT

## 2023-05-09 PROCEDURE — 6370000000 HC RX 637 (ALT 250 FOR IP): Performed by: INTERNAL MEDICINE

## 2023-05-09 PROCEDURE — C9113 INJ PANTOPRAZOLE SODIUM, VIA: HCPCS | Performed by: STUDENT IN AN ORGANIZED HEALTH CARE EDUCATION/TRAINING PROGRAM

## 2023-05-09 PROCEDURE — 80048 BASIC METABOLIC PNL TOTAL CA: CPT

## 2023-05-09 PROCEDURE — 94640 AIRWAY INHALATION TREATMENT: CPT

## 2023-05-09 PROCEDURE — 85014 HEMATOCRIT: CPT

## 2023-05-09 PROCEDURE — 2700000000 HC OXYGEN THERAPY PER DAY

## 2023-05-09 PROCEDURE — 83735 ASSAY OF MAGNESIUM: CPT

## 2023-05-09 PROCEDURE — 2500000003 HC RX 250 WO HCPCS: Performed by: NURSE PRACTITIONER

## 2023-05-09 PROCEDURE — 82746 ASSAY OF FOLIC ACID SERUM: CPT

## 2023-05-09 PROCEDURE — 36430 TRANSFUSION BLD/BLD COMPNT: CPT

## 2023-05-09 PROCEDURE — 85025 COMPLETE CBC W/AUTO DIFF WBC: CPT

## 2023-05-09 PROCEDURE — 1200000000 HC SEMI PRIVATE

## 2023-05-09 PROCEDURE — 6360000002 HC RX W HCPCS: Performed by: STUDENT IN AN ORGANIZED HEALTH CARE EDUCATION/TRAINING PROGRAM

## 2023-05-09 PROCEDURE — 82607 VITAMIN B-12: CPT

## 2023-05-09 PROCEDURE — 82728 ASSAY OF FERRITIN: CPT

## 2023-05-09 RX ORDER — DILTIAZEM HYDROCHLORIDE 120 MG/1
120 CAPSULE, COATED, EXTENDED RELEASE ORAL DAILY
Status: DISCONTINUED | OUTPATIENT
Start: 2023-05-09 | End: 2023-05-11 | Stop reason: HOSPADM

## 2023-05-09 RX ORDER — TORSEMIDE 20 MG/1
40 TABLET ORAL DAILY
Status: DISCONTINUED | OUTPATIENT
Start: 2023-05-09 | End: 2023-05-11 | Stop reason: HOSPADM

## 2023-05-09 RX ORDER — DOFETILIDE 0.25 MG/1
250 CAPSULE ORAL EVERY 12 HOURS SCHEDULED
Status: DISCONTINUED | OUTPATIENT
Start: 2023-05-09 | End: 2023-05-11 | Stop reason: HOSPADM

## 2023-05-09 RX ORDER — LABETALOL HYDROCHLORIDE 5 MG/ML
10 INJECTION, SOLUTION INTRAVENOUS ONCE
Status: COMPLETED | OUTPATIENT
Start: 2023-05-09 | End: 2023-05-09

## 2023-05-09 RX ORDER — SODIUM CHLORIDE 0.9 % (FLUSH) 0.9 %
5-40 SYRINGE (ML) INJECTION EVERY 12 HOURS SCHEDULED
Status: DISCONTINUED | OUTPATIENT
Start: 2023-05-09 | End: 2023-05-10 | Stop reason: SDUPTHER

## 2023-05-09 RX ORDER — SODIUM CHLORIDE 9 MG/ML
INJECTION, SOLUTION INTRAVENOUS PRN
Status: DISCONTINUED | OUTPATIENT
Start: 2023-05-09 | End: 2023-05-11 | Stop reason: HOSPADM

## 2023-05-09 RX ORDER — ACETAMINOPHEN 325 MG/1
650 TABLET ORAL EVERY 6 HOURS PRN
Status: DISCONTINUED | OUTPATIENT
Start: 2023-05-09 | End: 2023-05-11 | Stop reason: HOSPADM

## 2023-05-09 RX ORDER — ROSUVASTATIN CALCIUM 20 MG/1
20 TABLET, COATED ORAL DAILY
Status: DISCONTINUED | OUTPATIENT
Start: 2023-05-09 | End: 2023-05-11 | Stop reason: HOSPADM

## 2023-05-09 RX ORDER — ALBUTEROL SULFATE 2.5 MG/3ML
2.5 SOLUTION RESPIRATORY (INHALATION) EVERY 4 HOURS PRN
Status: DISCONTINUED | OUTPATIENT
Start: 2023-05-09 | End: 2023-05-11 | Stop reason: HOSPADM

## 2023-05-09 RX ORDER — ACETAMINOPHEN 650 MG/1
650 SUPPOSITORY RECTAL EVERY 6 HOURS PRN
Status: DISCONTINUED | OUTPATIENT
Start: 2023-05-09 | End: 2023-05-11 | Stop reason: HOSPADM

## 2023-05-09 RX ORDER — SODIUM CHLORIDE 9 MG/ML
INJECTION, SOLUTION INTRAVENOUS PRN
Status: DISCONTINUED | OUTPATIENT
Start: 2023-05-09 | End: 2023-05-09

## 2023-05-09 RX ORDER — FERROUS SULFATE TAB EC 324 MG (65 MG FE EQUIVALENT) 324 (65 FE) MG
324 TABLET DELAYED RESPONSE ORAL 2 TIMES DAILY
Status: DISCONTINUED | OUTPATIENT
Start: 2023-05-09 | End: 2023-05-09

## 2023-05-09 RX ORDER — RANOLAZINE 500 MG/1
500 TABLET, EXTENDED RELEASE ORAL 2 TIMES DAILY
Status: DISCONTINUED | OUTPATIENT
Start: 2023-05-09 | End: 2023-05-11 | Stop reason: HOSPADM

## 2023-05-09 RX ORDER — ISOSORBIDE MONONITRATE 60 MG/1
120 TABLET, EXTENDED RELEASE ORAL DAILY
Status: DISCONTINUED | OUTPATIENT
Start: 2023-05-09 | End: 2023-05-11 | Stop reason: HOSPADM

## 2023-05-09 RX ORDER — HYDRALAZINE HYDROCHLORIDE 50 MG/1
50 TABLET, FILM COATED ORAL EVERY 8 HOURS SCHEDULED
Status: DISCONTINUED | OUTPATIENT
Start: 2023-05-09 | End: 2023-05-11 | Stop reason: HOSPADM

## 2023-05-09 RX ORDER — SODIUM CHLORIDE 9 MG/ML
INJECTION, SOLUTION INTRAVENOUS PRN
Status: DISCONTINUED | OUTPATIENT
Start: 2023-05-09 | End: 2023-05-10 | Stop reason: SDUPTHER

## 2023-05-09 RX ORDER — SODIUM CHLORIDE 0.9 % (FLUSH) 0.9 %
5-40 SYRINGE (ML) INJECTION PRN
Status: DISCONTINUED | OUTPATIENT
Start: 2023-05-09 | End: 2023-05-10 | Stop reason: SDUPTHER

## 2023-05-09 RX ADMIN — HYDRALAZINE HYDROCHLORIDE 50 MG: 50 TABLET, FILM COATED ORAL at 06:38

## 2023-05-09 RX ADMIN — MOMETASONE FUROATE AND FORMOTEROL FUMARATE DIHYDRATE 2 PUFF: 200; 5 AEROSOL RESPIRATORY (INHALATION) at 19:55

## 2023-05-09 RX ADMIN — Medication 10 ML: at 09:34

## 2023-05-09 RX ADMIN — PANTOPRAZOLE SODIUM 8 MG/HR: 40 INJECTION, POWDER, FOR SOLUTION INTRAVENOUS at 07:27

## 2023-05-09 RX ADMIN — TORSEMIDE 40 MG: 20 TABLET ORAL at 09:34

## 2023-05-09 RX ADMIN — ISOSORBIDE MONONITRATE 120 MG: 60 TABLET, EXTENDED RELEASE ORAL at 09:34

## 2023-05-09 RX ADMIN — DOFETILIDE 250 MCG: 0.25 CAPSULE ORAL at 09:34

## 2023-05-09 RX ADMIN — MOMETASONE FUROATE AND FORMOTEROL FUMARATE DIHYDRATE 2 PUFF: 200; 5 AEROSOL RESPIRATORY (INHALATION) at 09:04

## 2023-05-09 RX ADMIN — RANOLAZINE 500 MG: 500 TABLET, FILM COATED, EXTENDED RELEASE ORAL at 20:19

## 2023-05-09 RX ADMIN — RANOLAZINE 500 MG: 500 TABLET, FILM COATED, EXTENDED RELEASE ORAL at 09:34

## 2023-05-09 RX ADMIN — ROSUVASTATIN CALCIUM 20 MG: 20 TABLET, FILM COATED ORAL at 09:34

## 2023-05-09 RX ADMIN — ACETAMINOPHEN 650 MG: 325 TABLET ORAL at 20:42

## 2023-05-09 RX ADMIN — PANTOPRAZOLE SODIUM 80 MG: 40 INJECTION, POWDER, FOR SOLUTION INTRAVENOUS at 01:13

## 2023-05-09 RX ADMIN — HYDRALAZINE HYDROCHLORIDE 50 MG: 50 TABLET, FILM COATED ORAL at 15:15

## 2023-05-09 RX ADMIN — POLYETHYLENE GLYCOL-3350 AND ELECTROLYTES 4000 ML: 236; 6.74; 5.86; 2.97; 22.74 POWDER, FOR SOLUTION ORAL at 18:39

## 2023-05-09 RX ADMIN — LABETALOL HYDROCHLORIDE 10 MG: 5 INJECTION, SOLUTION INTRAVENOUS at 20:42

## 2023-05-09 RX ADMIN — TIOTROPIUM BROMIDE INHALATION SPRAY 2 PUFF: 3.12 SPRAY, METERED RESPIRATORY (INHALATION) at 09:04

## 2023-05-09 RX ADMIN — FERROUS SULFATE TAB EC 324 MG (65 MG FE EQUIVALENT) 324 MG: 324 (65 FE) TABLET DELAYED RESPONSE at 01:00

## 2023-05-09 RX ADMIN — DILTIAZEM HYDROCHLORIDE 120 MG: 120 CAPSULE, EXTENDED RELEASE ORAL at 09:34

## 2023-05-09 RX ADMIN — Medication 10 ML: at 20:50

## 2023-05-09 RX ADMIN — FERROUS SULFATE TAB EC 324 MG (65 MG FE EQUIVALENT) 324 MG: 324 (65 FE) TABLET DELAYED RESPONSE at 09:34

## 2023-05-09 RX ADMIN — PANTOPRAZOLE SODIUM 8 MG/HR: 40 INJECTION, POWDER, FOR SOLUTION INTRAVENOUS at 03:16

## 2023-05-09 RX ADMIN — DOFETILIDE 250 MCG: 0.25 CAPSULE ORAL at 21:56

## 2023-05-09 RX ADMIN — HYDRALAZINE HYDROCHLORIDE 50 MG: 50 TABLET, FILM COATED ORAL at 21:56

## 2023-05-09 RX ADMIN — SODIUM CHLORIDE, PRESERVATIVE FREE 10 ML: 5 INJECTION INTRAVENOUS at 09:37

## 2023-05-09 RX ADMIN — SODIUM CHLORIDE, PRESERVATIVE FREE 10 ML: 5 INJECTION INTRAVENOUS at 03:15

## 2023-05-09 ASSESSMENT — PAIN SCALES - GENERAL
PAINLEVEL_OUTOF10: 0
PAINLEVEL_OUTOF10: 7
PAINLEVEL_OUTOF10: 1
PAINLEVEL_OUTOF10: 0
PAINLEVEL_OUTOF10: 0

## 2023-05-09 ASSESSMENT — PAIN SCALES - WONG BAKER: WONGBAKER_NUMERICALRESPONSE: 0

## 2023-05-09 ASSESSMENT — PAIN DESCRIPTION - LOCATION: LOCATION: HEAD;BACK

## 2023-05-09 NOTE — CONSULTS
GASTROENTEROLOGY INPATIENT CONSULTATION        IDENTIFYING DATA/REASON FOR CONSULTATION   PATIENT:  Rodo Glaser  MRN:  [id number]  ADMIT DATE: 5/8/2023  TIME OF EVALUATION: 5/9/2023 6:38 AM  HOSPITAL STAY:   LOS: 1 day     REASON FOR CONSULTATION:  melena    HISTORY OF PRESENT ILLNESS   Rodo Glaser is a 69 y.o. female with a PMH of of COPD on chronic oxygen, CAD s/p PCI on Plavix, atrial fibrillation on Xarelto, NYHA IV CHF s/p CardioMEMS, DVT, PE, AAA with history of endovascular repair, hypertension, hyperlipidemia and obesity who presented on 5/8/2023 with anemia. She was sent to the hospital by her Cardiologist after output blood work showed an acute drop in hemoglobin. Pt was admitted to hospital last month with anemia. At that time she was having recurrent nose bleeds, melena, hematochezia. She underwent EGD that showed nonbleeding gastric AVM that was obliterated with APC. Given significant medical comorbidities and resolution of hematochezia, colonoscopy was not pursued. Her last colonoscopy in 2016 showed small sessile sigmoid colon polyp that was removed and left sided diverticulitis. Pt reports her stools have been dark since her last admission but is on oral iron supplements. She denies red blood in her stool. She denies any further nose bleeds. Hgb 6.1 (from 7.7 two weeks ago)  BUN 36, creatinine 1.6    Prior Endoscopic Evaluations:  EGD 4/26/23 with Dr. Cary DAVIS grade B esophagitis  4 mm nonbleeding AVM in the gastric body, obliterated with APC  Prominent mucosal fold adjacent to ampulla without evidence of adenoma or ulceration    Colonoscopy 2016 with Dr. Ibis Webb  1. Small sessile sigmoid colon polyp, cold snared and removed  2. Left-sided diverticulosis  Colon, sigmoid, biopsy:   -     Tubular adenoma. -     No high grade dysplasia or malignancy.       PAST MEDICAL, SURGICAL, FAMILY, and SOCIAL HISTORY     Past Medical History:   Diagnosis Date    AAA (abdominal aortic aneurysm)

## 2023-05-09 NOTE — CARE COORDINATION
05/09/23 1702   Readmission Assessment   Number of Days since last admission? 8-30 days   Previous Disposition Home with Home Health   Who is being Interviewed Patient   What was the patient's/caregiver's perception as to why they think they needed to return back to the hospital? Other (Comment)  (SOB bleeding)   Did you visit your Primary Care Physician after you left the hospital, before you returned this time? No   Why weren't you able to visit your PCP? Did not have an appointment   Did you see a specialist, such as Cardiac, Pulmonary, Orthopedic Physician, etc. after you left the hospital? Yes   Who advised the patient to return to the hospital? Self-referral   Does the patient report anything that got in the way of taking their medications? No   In our efforts to provide the best possible care to you and others like you, can you think of anything that we could have done to help you after you left the hospital the first time, so that you might not have needed to return so soon?  Other (Comment)  (denies)

## 2023-05-09 NOTE — ED PROVIDER NOTES
ED Attending Attestation Note    This patient was seen by the advanced practice provider. I personally saw the patient and performed a substantive portion of the visit including all aspects of the medical decision making. Briefly, 69 y.o. female who  has a past medical history of AAA (abdominal aortic aneurysm) ([facility] [address] AAA (abdominal aortic aneurysm) without rupture (HCC), Atrial fibrillation ([facility] [address] CAD (coronary artery disease), CHF (congestive heart failure) ([facility] [address] COPD (chronic obstructive pulmonary disease) ([facility] [address] History of blood clots, Hyperlipidemia, and Hypertension. presents with complaints of general fatigue and anemia on outpatient blood testing. Patient has history of upper GI bleed and has been having some dark stool. Patient also complaining of some shortness of breath. No chest pain. No cough or sputum production. No fevers or chills. Focused exam:   Gen: 69 y.o. female, NAD  HEENT: NCAT. PERRL. EOMI. CV: RRR w/o MRG  Lungs: CTAB. No incr WOB. Abdomen: Soft, nontender, nondistended. No rebound/guarding. Please see midlevel documentation for rectal exam      MDM:   Patient seen and evaluated. History and physical as above. Patient presents for abnormal blood testing outpatient where she had anemia. Patient's occult stool sample positive. Her hemoglobin here is 6.2. Hematocrit 19.8. WBC 9.2. MARK with creatinine 1.7 and BUN of 35. Electrolytes within normal limits. INR 2.86. Patient is O- and there is a shortage of O- blood at this time and blood bank states that I cannot transfuse patient with O- blood unless it is an absolute emergency. Hematology was consulted and states that the patient can be transfused with O+ blood. Plan for transfusion and admission for anemia and GI bleed. Patient agreeable with care plan.     CRITICAL CARE  I personally saw the patient and independently provided 20 minutes of non-concurrent critical care out of the total shared critical care

## 2023-05-09 NOTE — ED NOTES
Handoff report given to on coming RN to assume care. No further questions at this time. This RN will transport the pt to room 3116. If any questions arise, please call [phone].      Aurelio Burkitt, RN  05/08/23 1033

## 2023-05-09 NOTE — H&P
History and Physical      Name:  Isabela Crawley /Age/Sex: 1945  (69 y.o. female)   MRN & CSN:  [id number] & [id number] Encounter Date/Time: 2023 10:41 PM EDT   Location:  [address] [place] [address] PCP: Sylvia Sanford MD       Hospital Day: 1    Assessment and Plan:     Patient is a 78-year-old female who presented with abnormal labs. # Symptomatic anemia secondary to melena  - Endorsed having worsening SOB and LI for the past few weeks. Denied any NSAIDs, however, endorsed melenotic stools which she attributed to iron supplements. Currently on Xarelto for PAF. Hg 6.1 on outpatient labs for Cardiology. - Repeat Hg 6.2 in the ED. Transfused x1 u pRBC in the ED.  - Started on Protonix gtt. GI consulted, CLD. # PAF  - Hold Eliquis, continue Tikosyn and Diltiazem. # HFpEF  - TTE in 2020 with LVEF of 70% and G2DD.   - Evidence of mild volume overload, continue home Demadex. Continue Ranexa and Imdur with holding parameters. - Monitor volume status closely with blood transfusions. # COPD with chronic hypoxemic respiratory failure (on 2-3 L NC)  - Continue home inhalers. Checklist:  Advanced directive: full  Diet: CLD  DVT ppx: held    Disposition: admit to inpatient. Estimated discharge: 2-3 day(s). Current living situation: home. Expected disposition: home. Spoke with ED provider who recommended admission for the patient and I agree with that plan. Personally reviewed lab studies and imaging. EKG interpreted personally and results as stated above. Imaging that was interpreted personally and results as stated above. History of Present Illness:     Chief Complaint: abnormal labs    Patient is a 78-year-old female with a PMHx of HFpEF, PAF, COPD with chronic hypoxemic respiratory failure (on 2-3 L NC) and class III obesity who presented to the ED with Hg of 6.1 that was resulted on outpatient labs for Cardiology. Endorsed having worsening SOB and LI for the past few weeks.  Denied any NSAIDs,

## 2023-05-09 NOTE — CARE COORDINATION
[facility]  [facility] is [facility] for nurse, PT/OT. Will follow for discharge to home with orders to resume care.       Brittani Luciano RN, BSN CTN  [facility] [phone]

## 2023-05-10 ENCOUNTER — ANESTHESIA (OUTPATIENT)
Dept: ENDOSCOPY | Age: 78
DRG: 378 | End: 2023-05-10
Payer: MEDICARE

## 2023-05-10 ENCOUNTER — ANESTHESIA EVENT (OUTPATIENT)
Dept: ENDOSCOPY | Age: 78
DRG: 378 | End: 2023-05-10
Payer: MEDICARE

## 2023-05-10 LAB
BASOPHILS # BLD: 0 K/UL (ref 0–0.2)
BASOPHILS NFR BLD: 0.4 %
DEPRECATED RDW RBC AUTO: 20.3 % (ref 12.4–15.4)
EOSINOPHIL # BLD: 0.6 K/UL (ref 0–0.6)
EOSINOPHIL NFR BLD: 6.7 %
GLUCOSE BLD-MCNC: 109 MG/DL (ref 70–99)
GLUCOSE BLD-MCNC: 123 MG/DL (ref 70–99)
HCT VFR BLD AUTO: 23.1 % (ref 36–48)
HCT VFR BLD AUTO: 24.5 % (ref 36–48)
HGB BLD-MCNC: 7.3 G/DL (ref 12–16)
HGB BLD-MCNC: 8 G/DL (ref 12–16)
LYMPHOCYTES # BLD: 0.5 K/UL (ref 1–5.1)
LYMPHOCYTES NFR BLD: 6 %
MCH RBC QN AUTO: 29.3 PG (ref 26–34)
MCHC RBC AUTO-ENTMCNC: 32.7 G/DL (ref 31–36)
MCV RBC AUTO: 89.4 FL (ref 80–100)
MONOCYTES # BLD: 0.6 K/UL (ref 0–1.3)
MONOCYTES NFR BLD: 7.4 %
NEUTROPHILS # BLD: 6.9 K/UL (ref 1.7–7.7)
NEUTROPHILS NFR BLD: 79.5 %
PERFORMED ON: ABNORMAL
PERFORMED ON: ABNORMAL
PLATELET # BLD AUTO: 183 K/UL (ref 135–450)
PMV BLD AUTO: 8.7 FL (ref 5–10.5)
RBC # BLD AUTO: 2.73 M/UL (ref 4–5.2)
WBC # BLD AUTO: 8.6 K/UL (ref 4–11)

## 2023-05-10 PROCEDURE — 3700000000 HC ANESTHESIA ATTENDED CARE: Performed by: INTERNAL MEDICINE

## 2023-05-10 PROCEDURE — 6360000002 HC RX W HCPCS: Performed by: NURSE ANESTHETIST, CERTIFIED REGISTERED

## 2023-05-10 PROCEDURE — 2500000003 HC RX 250 WO HCPCS: Performed by: NURSE ANESTHETIST, CERTIFIED REGISTERED

## 2023-05-10 PROCEDURE — 94760 N-INVAS EAR/PLS OXIMETRY 1: CPT

## 2023-05-10 PROCEDURE — 2580000003 HC RX 258: Performed by: NURSE ANESTHETIST, CERTIFIED REGISTERED

## 2023-05-10 PROCEDURE — 85018 HEMOGLOBIN: CPT

## 2023-05-10 PROCEDURE — 2580000003 HC RX 258: Performed by: ANESTHESIOLOGY

## 2023-05-10 PROCEDURE — 1200000000 HC SEMI PRIVATE

## 2023-05-10 PROCEDURE — 7100000001 HC PACU RECOVERY - ADDTL 15 MIN: Performed by: INTERNAL MEDICINE

## 2023-05-10 PROCEDURE — 85014 HEMATOCRIT: CPT

## 2023-05-10 PROCEDURE — 6370000000 HC RX 637 (ALT 250 FOR IP): Performed by: STUDENT IN AN ORGANIZED HEALTH CARE EDUCATION/TRAINING PROGRAM

## 2023-05-10 PROCEDURE — 3609027000 HC COLONOSCOPY: Performed by: INTERNAL MEDICINE

## 2023-05-10 PROCEDURE — 85025 COMPLETE CBC W/AUTO DIFF WBC: CPT

## 2023-05-10 PROCEDURE — 2700000000 HC OXYGEN THERAPY PER DAY

## 2023-05-10 PROCEDURE — 0DJD8ZZ INSPECTION OF LOWER INTESTINAL TRACT, VIA NATURAL OR ARTIFICIAL OPENING ENDOSCOPIC: ICD-10-PCS | Performed by: INTERNAL MEDICINE

## 2023-05-10 PROCEDURE — 3700000001 HC ADD 15 MINUTES (ANESTHESIA): Performed by: INTERNAL MEDICINE

## 2023-05-10 PROCEDURE — 7100000000 HC PACU RECOVERY - FIRST 15 MIN: Performed by: INTERNAL MEDICINE

## 2023-05-10 PROCEDURE — 94640 AIRWAY INHALATION TREATMENT: CPT

## 2023-05-10 PROCEDURE — 2580000003 HC RX 258: Performed by: PHYSICIAN ASSISTANT

## 2023-05-10 PROCEDURE — 2709999900 HC NON-CHARGEABLE SUPPLY: Performed by: INTERNAL MEDICINE

## 2023-05-10 RX ORDER — PROPOFOL 10 MG/ML
INJECTION, EMULSION INTRAVENOUS PRN
Status: DISCONTINUED | OUTPATIENT
Start: 2023-05-10 | End: 2023-05-10 | Stop reason: SDUPTHER

## 2023-05-10 RX ORDER — SODIUM CHLORIDE 9 MG/ML
INJECTION, SOLUTION INTRAVENOUS PRN
Status: DISCONTINUED | OUTPATIENT
Start: 2023-05-10 | End: 2023-05-11 | Stop reason: HOSPADM

## 2023-05-10 RX ORDER — SODIUM CHLORIDE 9 MG/ML
INJECTION, SOLUTION INTRAVENOUS CONTINUOUS PRN
Status: DISCONTINUED | OUTPATIENT
Start: 2023-05-10 | End: 2023-05-10 | Stop reason: SDUPTHER

## 2023-05-10 RX ORDER — LIDOCAINE HYDROCHLORIDE 20 MG/ML
INJECTION, SOLUTION EPIDURAL; INFILTRATION; INTRACAUDAL; PERINEURAL PRN
Status: DISCONTINUED | OUTPATIENT
Start: 2023-05-10 | End: 2023-05-10 | Stop reason: SDUPTHER

## 2023-05-10 RX ORDER — SODIUM CHLORIDE 0.9 % (FLUSH) 0.9 %
5-40 SYRINGE (ML) INJECTION EVERY 12 HOURS SCHEDULED
Status: DISCONTINUED | OUTPATIENT
Start: 2023-05-10 | End: 2023-05-11 | Stop reason: HOSPADM

## 2023-05-10 RX ORDER — PROPOFOL 10 MG/ML
INJECTION, EMULSION INTRAVENOUS CONTINUOUS PRN
Status: DISCONTINUED | OUTPATIENT
Start: 2023-05-10 | End: 2023-05-10 | Stop reason: SDUPTHER

## 2023-05-10 RX ORDER — SODIUM CHLORIDE 0.9 % (FLUSH) 0.9 %
5-40 SYRINGE (ML) INJECTION PRN
Status: DISCONTINUED | OUTPATIENT
Start: 2023-05-10 | End: 2023-05-11 | Stop reason: HOSPADM

## 2023-05-10 RX ADMIN — SODIUM CHLORIDE, PRESERVATIVE FREE 10 ML: 5 INJECTION INTRAVENOUS at 11:11

## 2023-05-10 RX ADMIN — TIOTROPIUM BROMIDE INHALATION SPRAY 2 PUFF: 3.12 SPRAY, METERED RESPIRATORY (INHALATION) at 08:12

## 2023-05-10 RX ADMIN — PROPOFOL 40 MG: 10 INJECTION, EMULSION INTRAVENOUS at 12:47

## 2023-05-10 RX ADMIN — HYDRALAZINE HYDROCHLORIDE 50 MG: 50 TABLET, FILM COATED ORAL at 23:08

## 2023-05-10 RX ADMIN — DOFETILIDE 250 MCG: 0.25 CAPSULE ORAL at 23:08

## 2023-05-10 RX ADMIN — HYDRALAZINE HYDROCHLORIDE 50 MG: 50 TABLET, FILM COATED ORAL at 06:30

## 2023-05-10 RX ADMIN — HYDRALAZINE HYDROCHLORIDE 50 MG: 50 TABLET, FILM COATED ORAL at 16:26

## 2023-05-10 RX ADMIN — SODIUM CHLORIDE: 9 INJECTION, SOLUTION INTRAVENOUS at 12:27

## 2023-05-10 RX ADMIN — MOMETASONE FUROATE AND FORMOTEROL FUMARATE DIHYDRATE 2 PUFF: 200; 5 AEROSOL RESPIRATORY (INHALATION) at 08:14

## 2023-05-10 RX ADMIN — DILTIAZEM HYDROCHLORIDE 120 MG: 120 CAPSULE, EXTENDED RELEASE ORAL at 09:24

## 2023-05-10 RX ADMIN — PROPOFOL 140 MCG/KG/MIN: 10 INJECTION, EMULSION INTRAVENOUS at 12:40

## 2023-05-10 RX ADMIN — PROPOFOL 50 MG: 10 INJECTION, EMULSION INTRAVENOUS at 12:39

## 2023-05-10 RX ADMIN — Medication 10 ML: at 23:13

## 2023-05-10 RX ADMIN — MOMETASONE FUROATE AND FORMOTEROL FUMARATE DIHYDRATE 2 PUFF: 200; 5 AEROSOL RESPIRATORY (INHALATION) at 19:09

## 2023-05-10 RX ADMIN — RANOLAZINE 500 MG: 500 TABLET, FILM COATED, EXTENDED RELEASE ORAL at 23:08

## 2023-05-10 RX ADMIN — SODIUM CHLORIDE, PRESERVATIVE FREE 10 ML: 5 INJECTION INTRAVENOUS at 00:08

## 2023-05-10 RX ADMIN — DOFETILIDE 250 MCG: 0.25 CAPSULE ORAL at 09:23

## 2023-05-10 RX ADMIN — ISOSORBIDE MONONITRATE 120 MG: 60 TABLET, EXTENDED RELEASE ORAL at 08:59

## 2023-05-10 RX ADMIN — LIDOCAINE HYDROCHLORIDE 40 MG: 20 INJECTION, SOLUTION EPIDURAL; INFILTRATION; INTRACAUDAL; PERINEURAL at 12:39

## 2023-05-10 ASSESSMENT — PAIN - FUNCTIONAL ASSESSMENT: PAIN_FUNCTIONAL_ASSESSMENT: 0-10

## 2023-05-10 ASSESSMENT — ENCOUNTER SYMPTOMS: SHORTNESS OF BREATH: 1

## 2023-05-10 ASSESSMENT — COPD QUESTIONNAIRES: CAT_SEVERITY: SEVERE

## 2023-05-10 NOTE — ACP (ADVANCE CARE PLANNING)
Directives and portable DNR orders.     Length of ACP Conversation in minutes:      Conversation Outcomes:  ACP discussion completed    Follow-up plan:    [] Schedule follow-up conversation to continue planning  [] Referred individual to Provider for additional questions/concerns   [] Advised patient/agent/surrogate to review completed ACP document and update if needed with changes in condition, patient preferences or care setting    [] This note routed to one or more involved healthcare providers        Electronically signed by Paulo Louis on 5/10/2023 at 10:00 AM  #[phone]

## 2023-05-10 NOTE — ANESTHESIA POSTPROCEDURE EVALUATION
Department of Anesthesiology  Postprocedure Note    Patient: Yolis Lemon  MRN: [id number]  YOB: 1945  Date of evaluation: 5/10/2023      Procedure Summary     Date: 05/10/23 Room / Location: [address] [place] [address]    Anesthesia Start: 1227 Anesthesia Stop: 1568    Procedure: COLONOSCOPY Diagnosis:       Anemia, unspecified type      (anemia)    Surgeons: Ra Almanza MD Responsible Provider: Julio Cesar Bhakta MD    Anesthesia Type: MAC ASA Status: 4 - Emergent          Anesthesia Type: MAC    Mode Phase I: Mode Score: 8    Mode Phase II:        Anesthesia Post Evaluation    Patient location during evaluation: bedside  Patient participation: complete - patient participated  Level of consciousness: awake and alert  Pain score: 2  Airway patency: patent  Nausea & Vomiting: no vomiting  Complications: no  Cardiovascular status: blood pressure returned to baseline  Respiratory status: acceptable  Hydration status: euvolemic

## 2023-05-10 NOTE — CARE COORDINATION
Met with patient's  Shirrel at bedside ( pt sleeping)  He states plan is for pt to return home at [place]  She is active w/ [address] and plans to resume their services   states he assists pt with getting to the shower and helping her [place] dressed--Assists with cooking and cleaning as well  They have a safety step in shower         05/10/23 1003   Service Assessment   Patient Orientation Alert and Oriented; Other (see comment)  (pt sleeping-spoke w/  at bedside)   Cognition Other (see comment)  (pt sleeping)   History Provided By Significant Other   Primary Caregiver Spouse   Accompanied By/Relationship spouse Shirrel   Support Systems Spouse/Significant Other   Patient's Healthcare Decision Maker is: Legal Next of Kin   PCP Verified by CM Yes   Last Visit to PCP Within last 3 months   Prior Functional Level Assistance with the following:;Bathing;Dressing;Cooking;Housework; Mobility; Shopping; Toileting   Current Functional Level Assistance with the following:;Mobility; Shopping; Bathing;Dressing; Toileting;Cooking;Housework   Can patient return to prior living arrangement Unknown at present   Ability to make needs known: Fair   Family able to assist with home care needs: Yes   Would you like for me to discuss the discharge plan with any other family members/significant others, and if so, who? Yes  ( at bedside)   [employer] None   Discharge Planning   Type of Residence [facility]   Living Arrangements Spouse/Significant Other   Current Services Prior To Admission Home Care   Current DME Prior to [employer]; Wheelchair; Other (Comment)  ([employer] 1)   Potential Assistance Needed N/A   DME Ordered?  No   Potential Assistance Purchasing Medications No   Patient expects to be discharged to: House     Case Management Assessment  Initial Evaluation    Date/Time of Evaluation: 5/10/2023 10:09 AM  Assessment Completed by: Molly Busby    If patient is discharged prior to next

## 2023-05-10 NOTE — OP NOTE
Colonoscopy Procedure Note      Patient: Ramonita Lazo  : 1945  Acct#:     Procedure: Colonoscopy  Date:  5/10/2023    Surgeon:  Adi Vo MD    Referring Physician:  Shirley Menendez MD    Previous Colonoscopy: YES  Date: Unknown  Greater than 3 years: N/A    Preoperative Diagnosis:  1. Anemia     Postoperative Diagnosis:  1. Moderate Sigmoid Diverticulosis. Consent:  The patient or their legal guardian has signed a consent, and is aware of the potential risks, benefits, alternatives, and potential complications of this procedure. These include, but are not limited to hemorrhage, bleeding, post procedural pain, perforation, phlebitis, aspiration, hypotension, hypoxia, cardiovascular events such as arryhthmia, and possibly death. Additionally, the possibility of missed colonic polyps and interval colon cancer was discussed in the consent. Anesthesia:  The patient was administered IV propofol per anesthesiology team.  Please see their operative records for full details. Procedure: An informed consent was obtained from the patient after explanation of indications, benefits, possible risks and complications of the procedure. The patient was then taken to the endoscopy suite, placed in the left lateral decubitus position, and the above IV anesthesia was administered. A digital rectal examination was performed and revealed negative without mass, lesions or tenderness. The Olympus video colonoscope was placed in the patient's rectum under digital direction and advanced to the cecum. The cecum was identified by characteristic anatomy and ballottment. The preparation was fair. Extensive lavage performed. The ileocecal valve was identified. The scope was then withdrawn back through the cecum, ascending, transverse, descending, sigmoid colon, and rectum. Careful circumferential examination of the mucosa in these areas demonstrated:     The left colon had evidence of

## 2023-05-10 NOTE — H&P
Pre-operative History and Physical    Patient: Isabela Crawley  : 1945  Acct#:     Intended Procedure:  Colonoscopy    HISTORY OF PRESENT ILLNESS:  The patient is a 69 y.o. female  who presents for/due to Anemia     Past Medical History:        Diagnosis Date    AAA (abdominal aortic aneurysm) ([facility] [address]     pt states it is 4cm    AAA (abdominal aortic aneurysm) without rupture (HCC) 2/10/2015    Atrial fibrillation (HCC)     CAD (coronary artery disease)     CHF (congestive heart failure) (HCC)     COPD (chronic obstructive pulmonary disease) ([facility] [address]     History of blood clots     Hyperlipidemia     Hypertension      Past Surgical History:        Procedure Laterality Date    ABDOMINAL AORTIC ANEURYSM REPAIR      Endovascular abdominal AA    APPENDECTOMY      incidental    BLADDER SUSPENSION      CARDIAC CATHETERIZATION Right 2022    Cardiomems PA sensor device implant Left PA    CARDIAC SURGERY      CATARACT REMOVAL      CHOLECYSTECTOMY  10/15/2013    COLONOSCOPY  2007    dr Adilson Yanes and check in 5 years. COLONOSCOPY  2017    ok dr arndt, repeat 5 years    HYSTERECTOMY ([facility] [address])      for benign tumor. just the uterus    JOINT REPLACEMENT  2013    right knee replacement    TONSILLECTOMY  as a child    TUMOR EXCISION      benign behind right ear about     UPPER GASTROINTESTINAL ENDOSCOPY N/A 2023    EGD CONTROL HEMORRHAGE WITH APC TO AVM performed by Arely Hughes MD at [facility]     Medications Prior to Admission:   Prior to Admission medications    Medication Sig Start Date End Date Taking? Authorizing Provider   torsemide (DEMADEX) 20 MG tablet Take 2 tablets by mouth daily 23   Mustapha Romo MD   tiotropium (SPIRIVA RESPIMAT) 2.5 MCG/ACT AERS inhaler Inhale 2 puffs into the lungs daily 23   Sylvia Sanford MD   miconazole (MICOTIN) 2 % powder Apply topically 2 times daily. Apply to abd folds.  23   Melodie Nassar MD   polyethylene

## 2023-05-10 NOTE — ANESTHESIA PRE PROCEDURE
Department of Anesthesiology  Preprocedure Note       Name:  Minh Cr   Age:  69 y.o.  :  1945                                          MRN:  [id number]         Date:  5/10/2023      Surgeon: Medina Groves):  Vandana Naqvi MD    Procedure: Procedure(s):  COLONOSCOPY    Medications prior to admission:   Prior to Admission medications    Medication Sig Start Date End Date Taking? Authorizing Provider   torsemide (DEMADEX) 20 MG tablet Take 2 tablets by mouth daily 23   Joanne Villalobos MD   tiotropium (SPIRIVA RESPIMAT) 2.5 MCG/ACT AERS inhaler Inhale 2 puffs into the lungs daily 23   Renay Boland MD   miconazole (MICOTIN) 2 % powder Apply topically 2 times daily. Apply to abd folds.  23   Juan Lennon MD   polyethylene glycol (GLYCOLAX) 17 g packet Take 17 g by mouth daily as needed for Constipation 23  Juan Lennon MD   pantoprazole (PROTONIX) 40 MG tablet Take 1 tablet by mouth every morning (before breakfast) 23   Juan Lennon MD   dilTIAZem (CARDIZEM CD) 120 MG extended release capsule Take 1 capsule by mouth daily 23   CHRISTINE Ayala CNP   rivaroxaban (XARELTO) 15 MG TABS tablet Take 1 tablet by mouth daily (with breakfast) 23   CHRISTINE Ayala CNP   ranolazine (RANEXA) 500 MG extended release tablet Take 1 tablet by mouth 2 times daily 23   Joanne Villalobos MD   ferrous sulfate (IRON 325) 325 (65 Fe) MG tablet Take 1 tablet by mouth 2 times daily 23   Joanne Villalobos MD   isosorbide mononitrate (IMDUR) 120 MG extended release tablet Take 1 tablet by mouth daily 3/29/23   Yoon Echols MD   clobetasol (TEMOVATE) 0.05 % cream Apply 1 Dose topically daily as needed 3/10/23   Historical Provider, MD   fluticasone-umeclidin-vilant (TRELEGY ELLIPTA) 200-62.5-25 MCG/ACT AEPB inhaler Inhale 1 puff into the lungs daily 23   Elena Goldman DO   rosuvastatin (CRESTOR) 20 MG tablet Take 1 tablet by mouth

## 2023-05-11 VITALS
HEIGHT: 64 IN | RESPIRATION RATE: 18 BRPM | SYSTOLIC BLOOD PRESSURE: 154 MMHG | TEMPERATURE: 97.8 F | HEART RATE: 70 BPM | BODY MASS INDEX: 42.3 KG/M2 | OXYGEN SATURATION: 95 % | DIASTOLIC BLOOD PRESSURE: 68 MMHG | WEIGHT: 247.8 LBS

## 2023-05-11 LAB
ANION GAP SERPL CALCULATED.3IONS-SCNC: 8 MMOL/L (ref 3–16)
BASOPHILS # BLD: 0 K/UL (ref 0–0.2)
BASOPHILS NFR BLD: 0.3 %
BUN SERPL-MCNC: 27 MG/DL (ref 7–20)
CALCIUM SERPL-MCNC: 8.9 MG/DL (ref 8.3–10.6)
CHLORIDE SERPL-SCNC: 97 MMOL/L (ref 99–110)
CO2 SERPL-SCNC: 33 MMOL/L (ref 21–32)
CREAT SERPL-MCNC: 1.6 MG/DL (ref 0.6–1.2)
DEPRECATED RDW RBC AUTO: 20.9 % (ref 12.4–15.4)
EOSINOPHIL # BLD: 0.5 K/UL (ref 0–0.6)
EOSINOPHIL NFR BLD: 6.4 %
GFR SERPLBLD CREATININE-BSD FMLA CKD-EPI: 33 ML/MIN/{1.73_M2}
GLUCOSE SERPL-MCNC: 120 MG/DL (ref 70–99)
HCT VFR BLD AUTO: 25.9 % (ref 36–48)
HGB BLD-MCNC: 8.3 G/DL (ref 12–16)
LYMPHOCYTES # BLD: 0.6 K/UL (ref 1–5.1)
LYMPHOCYTES NFR BLD: 7.4 %
MCH RBC QN AUTO: 29.4 PG (ref 26–34)
MCHC RBC AUTO-ENTMCNC: 32.1 G/DL (ref 31–36)
MCV RBC AUTO: 91.4 FL (ref 80–100)
MONOCYTES # BLD: 0.6 K/UL (ref 0–1.3)
MONOCYTES NFR BLD: 6.9 %
NEUTROPHILS # BLD: 6.7 K/UL (ref 1.7–7.7)
NEUTROPHILS NFR BLD: 79 %
PLATELET # BLD AUTO: 200 K/UL (ref 135–450)
PMV BLD AUTO: 9.2 FL (ref 5–10.5)
POTASSIUM SERPL-SCNC: 3 MMOL/L (ref 3.5–5.1)
RBC # BLD AUTO: 2.83 M/UL (ref 4–5.2)
SODIUM SERPL-SCNC: 138 MMOL/L (ref 136–145)
WBC # BLD AUTO: 8.5 K/UL (ref 4–11)

## 2023-05-11 PROCEDURE — 94640 AIRWAY INHALATION TREATMENT: CPT

## 2023-05-11 PROCEDURE — 6370000000 HC RX 637 (ALT 250 FOR IP): Performed by: NURSE PRACTITIONER

## 2023-05-11 PROCEDURE — 85025 COMPLETE CBC W/AUTO DIFF WBC: CPT

## 2023-05-11 PROCEDURE — 94761 N-INVAS EAR/PLS OXIMETRY MLT: CPT

## 2023-05-11 PROCEDURE — 2580000003 HC RX 258: Performed by: ANESTHESIOLOGY

## 2023-05-11 PROCEDURE — 6370000000 HC RX 637 (ALT 250 FOR IP): Performed by: STUDENT IN AN ORGANIZED HEALTH CARE EDUCATION/TRAINING PROGRAM

## 2023-05-11 PROCEDURE — 2700000000 HC OXYGEN THERAPY PER DAY

## 2023-05-11 PROCEDURE — 80048 BASIC METABOLIC PNL TOTAL CA: CPT

## 2023-05-11 RX ORDER — HYDRALAZINE HYDROCHLORIDE 20 MG/ML
10 INJECTION INTRAMUSCULAR; INTRAVENOUS EVERY 6 HOURS PRN
Status: DISCONTINUED | OUTPATIENT
Start: 2023-05-11 | End: 2023-05-11 | Stop reason: HOSPADM

## 2023-05-11 RX ORDER — LANOLIN ALCOHOL/MO/W.PET/CERES
3 CREAM (GRAM) TOPICAL ONCE
Status: COMPLETED | OUTPATIENT
Start: 2023-05-11 | End: 2023-05-11

## 2023-05-11 RX ADMIN — ISOSORBIDE MONONITRATE 120 MG: 60 TABLET, EXTENDED RELEASE ORAL at 07:58

## 2023-05-11 RX ADMIN — Medication 10 ML: at 07:59

## 2023-05-11 RX ADMIN — DOFETILIDE 250 MCG: 0.25 CAPSULE ORAL at 07:58

## 2023-05-11 RX ADMIN — HYDRALAZINE HYDROCHLORIDE 50 MG: 50 TABLET, FILM COATED ORAL at 07:58

## 2023-05-11 RX ADMIN — MOMETASONE FUROATE AND FORMOTEROL FUMARATE DIHYDRATE 2 PUFF: 200; 5 AEROSOL RESPIRATORY (INHALATION) at 07:51

## 2023-05-11 RX ADMIN — Medication 3 MG: at 02:45

## 2023-05-11 RX ADMIN — TIOTROPIUM BROMIDE INHALATION SPRAY 2 PUFF: 3.12 SPRAY, METERED RESPIRATORY (INHALATION) at 07:51

## 2023-05-11 RX ADMIN — ROSUVASTATIN CALCIUM 20 MG: 20 TABLET, FILM COATED ORAL at 07:58

## 2023-05-11 RX ADMIN — RANOLAZINE 500 MG: 500 TABLET, FILM COATED, EXTENDED RELEASE ORAL at 07:58

## 2023-05-11 RX ADMIN — DILTIAZEM HYDROCHLORIDE 120 MG: 120 CAPSULE, EXTENDED RELEASE ORAL at 07:58

## 2023-05-11 RX ADMIN — TORSEMIDE 40 MG: 20 TABLET ORAL at 07:58

## 2023-05-11 NOTE — CARE COORDINATION
[facility]    DC order noted, all docs needed have been faxed to [facility] for home care services.     Home care to see patient within 24-48 hrs    Mary Gutiérrez RN, BSN CTN  [facility] [phone]

## 2023-05-11 NOTE — CARE COORDINATION
DISCHARGE SUMMARY     DATE OF DISCHARGE: 5/11/23    DISCHARGE DESTINATION: Home      HOME CARE AGENCY: [facility]             PHONE NUMBER: [phone]             FAX NUMBER: [phone]    DME ORDERED: none

## 2023-05-11 NOTE — PLAN OF CARE
Problem: Confusion  Goal: Confusion, delirium, dementia, or psychosis is improved or at baseline  Description: INTERVENTIONS:  1. Assess for possible contributors to thought disturbance, including medications, impaired vision or hearing, underlying metabolic abnormalities, dehydration, psychiatric diagnoses, and notify attending LIP  2. [place] high risk fall precautions, as indicated  3. Provide frequent short contacts to provide reality reorientation, refocusing and direction  4. Decrease environmental stimuli, including noise as appropriate  5. Monitor and intervene to maintain adequate nutrition, hydration, elimination, sleep and activity  6. If unable to ensure safety without constant attention obtain sitter and review sitter guidelines with assigned personnel  7.  Initiate Psychosocial CNS and Spiritual Care consult, as indicated  Outcome: Progressing  Flowsheets (Taken 5/9/2023 0020)  Effect of thought disturbance (confusion, delirium, dementia, or psychosis) are managed with adequate functional status: Monitor and intervene to maintain adequate nutrition, hydration, elimination, sleep and activity     Problem: Safety - Adult  Goal: Free from fall injury  Outcome: Progressing     Problem: Musculoskeletal - Adult  Goal: Return mobility to safest level of function  Outcome: Progressing  Flowsheets (Taken 5/9/2023 0020)  Return Mobility to Safest Level of Function:   Assess patient stability and activity tolerance for standing, transferring and ambulating with or without assistive devices   Assist with transfers and ambulation using safe patient handling equipment as needed  Goal: Maintain proper alignment of affected body part  Outcome: Progressing  Goal: Return ADL status to a safe level of function  Outcome: Progressing
Problem: Confusion  Goal: Confusion, delirium, dementia, or psychosis is improved or at baseline  Description: INTERVENTIONS:  1. Assess for possible contributors to thought disturbance, including medications, impaired vision or hearing, underlying metabolic abnormalities, dehydration, psychiatric diagnoses, and notify attending LIP  2. [place] high risk fall precautions, as indicated  3. Provide frequent short contacts to provide reality reorientation, refocusing and direction  4. Decrease environmental stimuli, including noise as appropriate  5. Monitor and intervene to maintain adequate nutrition, hydration, elimination, sleep and activity  6. If unable to ensure safety without constant attention obtain sitter and review sitter guidelines with assigned personnel  7.  Initiate Psychosocial CNS and Spiritual Care consult, as indicated  Outcome: Progressing     Problem: Neurosensory - Adult  Goal: Achieves stable or improved neurological status  Outcome: Progressing  Goal: Achieves maximal functionality and self care  Outcome: Progressing
Problem: Safety - Adult  Goal: Free from fall injury  Outcome: Progressing     Problem: Confusion  Goal: Confusion, delirium, dementia, or psychosis is improved or at baseline  Description: INTERVENTIONS:  1. Assess for possible contributors to thought disturbance, including medications, impaired vision or hearing, underlying metabolic abnormalities, dehydration, psychiatric diagnoses, and notify attending LIP  2. [place] high risk fall precautions, as indicated  3. Provide frequent short contacts to provide reality reorientation, refocusing and direction  4. Decrease environmental stimuli, including noise as appropriate  5. Monitor and intervene to maintain adequate nutrition, hydration, elimination, sleep and activity  6. If unable to ensure safety without constant attention obtain sitter and review sitter guidelines with assigned personnel  7.  Initiate Psychosocial CNS and Spiritual Care consult, as indicated  Outcome: Progressing     Problem: Neurosensory - Adult  Goal: Achieves stable or improved neurological status  Outcome: Progressing     Problem: Neurosensory - Adult  Goal: Achieves maximal functionality and self care  Outcome: Progressing     Problem: Musculoskeletal - Adult  Goal: Return mobility to safest level of function  Outcome: Progressing     Problem: Musculoskeletal - Adult  Goal: Maintain proper alignment of affected body part  Outcome: Progressing     Problem: Musculoskeletal - Adult  Goal: Return ADL status to a safe level of function  Outcome: Progressing
affected body part  5/9/2023 0753 by Maryla Bernheim, RN  Outcome: Progressing  Flowsheets (Taken 5/9/2023 0827)  Maintain proper alignment of affected body part: Support and protect limb and body alignment per provider's orders  5/9/2023 0402 by Julio Kitchen RN  Outcome: Progressing  Goal: Return ADL status to a safe level of function  5/9/2023 0753 by Maryla Bernheim, RN  Outcome: Progressing  Flowsheets (Taken 5/9/2023 0753)  Return ADL Status to a Safe Level of Function: Administer medication as ordered  5/9/2023 0402 by Julio Kitchen RN  Outcome: Progressing     Problem: Gastrointestinal - Adult  Goal: Minimal or absence of nausea and vomiting  Outcome: Progressing  Flowsheets (Taken 5/9/2023 0753)  Minimal or absence of nausea and vomiting: Administer IV fluids as ordered to ensure adequate hydration  Goal: Maintains or returns to baseline bowel function  Outcome: Progressing  Flowsheets (Taken 5/9/2023 0753)  Maintains or returns to baseline bowel function: Assess bowel function  Goal: Maintains adequate nutritional intake  Outcome: Progressing  Flowsheets (Taken 5/9/2023 0753)  Maintains adequate nutritional intake: Monitor percentage of each meal consumed  Goal: Establish and maintain optimal ostomy function  Outcome: Progressing  Flowsheets (Taken 5/9/2023 0753)  Establish and maintain optimal ostomy function: Monitor output from ostomies     Problem: Genitourinary - Adult  Goal: Absence of urinary retention  Outcome: Progressing  Flowsheets (Taken 5/9/2023 0753)  Absence of urinary retention: Assess patients ability to void and empty bladder     Problem: Infection - Adult  Goal: Absence of infection at discharge  Outcome: Progressing  Flowsheets (Taken 5/9/2023 0753)  Absence of infection at discharge:   Assess and monitor for signs and symptoms of infection   Monitor lab/diagnostic results  Goal: Absence of infection during hospitalization  Outcome: Progressing  Flowsheets (Taken 5/9/2023
2746)  Maintains hematologic stability: Assess for signs and symptoms of bleeding or hemorrhage     Problem: Pain  Goal: Verbalizes/displays adequate comfort level or baseline comfort level  Outcome: Progressing     Problem: Chronic Conditions and Co-morbidities  Goal: Patient's chronic conditions and co-morbidity symptoms are monitored and maintained or improved  Outcome: Progressing  Flowsheets (Taken 5/10/2023 6199)  Care Plan - Patient's Chronic Conditions and Co-Morbidity Symptoms are Monitored and Maintained or Improved: Monitor and assess patient's chronic conditions and comorbid symptoms for stability, deterioration, or improvement

## 2023-05-11 NOTE — DISCHARGE SUMMARY
Hospital Medicine Discharge Summary    Patient ID: Nena Delaney      Patient's PCP: Kathryn Butler MD    Admit Date: 5/8/2023     Discharge Date:   5/11/23    Admitting Physician: Jaylene Youngblood MD     Discharge Physician: Miah Charles MD     Discharge Diagnoses: Active Hospital Problems    Diagnosis     Symptomatic anemia [D64.9]        The patient was seen and examined on day of discharge and this discharge summary is in conjunction with any daily progress note from day of discharge. Hospital Course:   Patient is a 20-year-old female with a PMHx of HFpEF, PAF, COPD with chronic hypoxemic respiratory failure (on 2-3 L NC) and class III obesity who presented to the ED with Hg of 6.1 that was resulted on outpatient labs for Cardiology. Endorsed having worsening SOB and LI for the past few weeks. Denied any NSAIDs, however, endorsed melenotic stools which she attributed to iron supplements. Currently on Xarelto for PAF. Denied any other source of bleeding or other complaints. History obtained from: patient and spouse. Procedure: Colonoscopy  Date:  5/10/2023     Surgeon:  Augie Rai MD     Referring Physician:  Kathryn Butler MD     Previous Colonoscopy: YES  Date: Unknown  Greater than 3 years: N/A     Preoperative Diagnosis:  1. Anemia      Postoperative Diagnosis:  1. Moderate Sigmoid Diverticulosis. Recommendations:    No bleeding sources on the colonoscopy. EGD performed 4/26 with esophagitis and AVM ablated. Continue PPI. Continue IV iron while in the hospital and oral at [place]. Consider risk benefits of continuing on anticoagulation. Consider capsule if continues to remain anemic. OK for regular diet. # Symptomatic anemia secondary to melena  - Endorsed having worsening SOB and LI for the past few weeks. Denied any NSAIDs, however, endorsed melenotic stools which she attributed to iron supplements. Currently on Xarelto for PAF.  Hg 6.1 on outpatient labs

## 2023-05-11 NOTE — DISCHARGE INSTR - COC
Continuity of Care Form    Patient Name: Ye Massey   :  1945  MRN:  [id number]    6 [facility] date:  2023  Discharge date:  2023    Code Status Order: Full Code   Advance Directives:     Admitting Physician:  Alejandro Cannon MD  PCP: Marylen Skeens, MD    Discharging Nurse: [facility]/Room#: 482 [facility] Phone Number:     Emergency Contact:   Extended Emergency Contact Information  Primary Emergency Contact: Linda Perez  Address: [address] [facility], [address] [place] [address] Phone: [phone]  Relation: Spouse  Secondary Emergency Contact: Philippe [address] Phone: [phone]  Relation: Child    Past Surgical History:  Past Surgical History:   Procedure Laterality Date    ABDOMINAL AORTIC ANEURYSM REPAIR      Endovascular abdominal AA    APPENDECTOMY      incidental    BLADDER SUSPENSION      CARDIAC CATHETERIZATION Right 2022    Cardiomems PA sensor device implant Left PA    CARDIAC SURGERY      CATARACT REMOVAL      CHOLECYSTECTOMY  10/15/2013    COLONOSCOPY  2007    dr Jaime Rosa and check in 5 years. COLONOSCOPY  2017    ok dr arndt, repeat 5 years    COLONOSCOPY N/A 5/10/2023    COLONOSCOPY performed by Per Abreu MD at [address] [place] [address] ([address] [place] [address])      for benign tumor.  just the uterus    JOINT REPLACEMENT  2013    right knee replacement    TONSILLECTOMY  as a child    TUMOR EXCISION      benign behind right ear about     UPPER GASTROINTESTINAL ENDOSCOPY N/A 2023    EGD CONTROL HEMORRHAGE WITH APC TO AVM performed by Tami Nesbitt MD at [facility] History:   Immunization History   Administered Date(s) Administered    COVID-19, PFIZER Bivalent BOOSTER, DO NOT Dilute, (age 12y+), IM, 30 mcg/0.3 mL 2023    COVID-19, PFIZER GRAY top, DO NOT Dilute, (age 15 y+), IM, 30 mcg/0.3 mL

## 2023-05-12 ENCOUNTER — OFFICE VISIT (OUTPATIENT)
Dept: CARDIOLOGY CLINIC | Age: 78
End: 2023-05-12

## 2023-05-12 ENCOUNTER — CLINICAL DOCUMENTATION ONLY (OUTPATIENT)
Facility: CLINIC | Age: 78
End: 2023-05-12

## 2023-05-12 VITALS
HEIGHT: 64 IN | WEIGHT: 243 LBS | OXYGEN SATURATION: 98 % | BODY MASS INDEX: 41.48 KG/M2 | DIASTOLIC BLOOD PRESSURE: 78 MMHG | SYSTOLIC BLOOD PRESSURE: 134 MMHG | HEART RATE: 57 BPM

## 2023-05-12 DIAGNOSIS — G47.33 OSA (OBSTRUCTIVE SLEEP APNEA): ICD-10-CM

## 2023-05-12 DIAGNOSIS — R06.09 CHRONIC DYSPNEA: ICD-10-CM

## 2023-05-12 DIAGNOSIS — I25.10 CORONARY ARTERY DISEASE INVOLVING NATIVE CORONARY ARTERY OF NATIVE HEART WITHOUT ANGINA PECTORIS: ICD-10-CM

## 2023-05-12 DIAGNOSIS — I10 ESSENTIAL HYPERTENSION: ICD-10-CM

## 2023-05-12 DIAGNOSIS — Z87.19 HISTORY OF GI BLEED: ICD-10-CM

## 2023-05-12 DIAGNOSIS — I50.32 CHRONIC DIASTOLIC HEART FAILURE (HCC): Primary | ICD-10-CM

## 2023-05-12 DIAGNOSIS — J44.9 COPD, SEVERE (HCC): ICD-10-CM

## 2023-05-12 DIAGNOSIS — E78.5 HYPERLIPIDEMIA LDL GOAL <70: ICD-10-CM

## 2023-05-12 DIAGNOSIS — D64.9 CHRONIC ANEMIA: ICD-10-CM

## 2023-05-12 DIAGNOSIS — I71.40 ABDOMINAL AORTIC ANEURYSM (AAA) WITHOUT RUPTURE, UNSPECIFIED PART (HCC): ICD-10-CM

## 2023-05-12 DIAGNOSIS — I48.0 PAF (PAROXYSMAL ATRIAL FIBRILLATION) (HCC): ICD-10-CM

## 2023-05-12 NOTE — PROGRESS NOTES
[facility]  Office Visit Progress Note    Neo Flores  1945    May 12, 2023    CC: \"I'm tired of being in the hospital\"     HPI:  The patient is 69 y.o. female with a past medical history significant for CAD, heart failure, atrial fib, HTN, aortic aneurysm and COPD. Abnormal stress followed by [facility] 3/2/18 which revealed  of the RCA and nonobstructive CAD of the LAD and LCX. Endovascular AAA repair on 3/21/18 by Dr. Mary Huerta. Admitted 2/25/20 for atrial fibrillation along with signs and symptoms of heart failure. She converted to sinus rhythm after she was started on amiodarone therapy. S/p PVI ablation 10/2020, recurrence s/p DCCV 1/12/21, repeat AFIB ablation 2/17/21. Admitted 5/24-5/26/22 atrial fibrillation for dofetilide loading after having multiple ablations, cardioversions (last ablation 2/4/22 ,CV 4/2022) and failing flecainide therapy. She was started on 250mcg of dofetilide Q12H. he did convert to sinus rhythm on 5/25/22 with improvement in symptoms. She was discharged on 250mcg of dofetilide. Abnormal Lexiscan followed by [facility] on 9/20/22 per Dr. Silvestre Clark. Admitted 11/2-11/8/22 acute on chronic hypoxic respiratory failure. One episodes of rapid atrial fibrillation, symptomatic, started on cardizem and converted. [address] 11/4/22 revealed she was in acute on chronic diastolic heart failure. NYHA class II, stage C. Her oxygen demand improved  and oxygen was weaned to 2-2.5L. Her uncontrolled blood pressure, and recurrent atrial fibrillation playing a role in her CHF recurrences. 3/26/2023 reporting worsening SOB and BLE edema. CXR showed no  vidence of pulmonary congestion. CXR mild vascular congestion. Pulmonary and Cardiology consulted. Per Pulmonology chronic hypercapnic respiratory failure, COPD, suspected severe sleep apnea with discussions of BiPAP. Patient reported prior sleep study years ago with intolerance to CPAP therapy.  She is not interested in BiPAP at this

## 2023-05-16 ENCOUNTER — TELEPHONE (OUTPATIENT)
Dept: CARDIOLOGY CLINIC | Age: 78
End: 2023-05-16

## 2023-05-16 NOTE — PROGRESS NOTES
PICC PLACEMENT:    Upon arrival to place PICC line assessed chart for issues related to picc placement, check for consent, and did time out with Munira Pearson . Pt. Tolerated PICC placement well, no difficulty accessing right basilic vein and 3CG technology used to verify PICC tip placement. Positive P wave with no negative deflection. Printed wave form and placed In chart.  Reported off to Munira Pearson
Patient A&O. Pt able to get up this shift to stretcher for colonoscopy by walking with a contact guard assist of 2. Call light within reach, able to make needs known, fall precautions in place. Pt sitting up in bed watching television with  at bedside. Pt remains on 4L of O2 via nasal cannula.     Electronically signed by Salinas Dao RN on 5/10/2023 at 7:06 PM
Patient a&ox4. Awake and in bed. Patient took medications whole, no issues. Patient PICC infusing fluids as ordered and flushing well. Patient currently on 3 l NC, and sat in high 90s. Patient  at bedside. Patient hemo this morning still critical after 2 units of blood at 6.3, attending notified, new orders in for another unit. Patient currently infusing. Patient denies pain at this time. Patient denies n/v at this time as well. Patient able to make needs known, no other needs mentioned at this time. Call light and bedside table within reach. Will continue to monitor and reassess.
Patient admitted to PACU form ENDO. Patient opens eyes to name. Resp easy unlabored on 4L NC with SaO2 100%. Abdomen rounded soft. IV PICC line patent to right upper arm. Patient denies C/O pain or nausea. VSS.
Patient awake and alert. Resp easy unlabored on 4L NC with SaO2 100%. Abdomen rounded soft. Patient denies C/O pain or nausea. VSS. IV patent. Patient stable to transfer to room 3116.
Patient doing well this morning, sitting upright in the chair and states all needs are meet at this time. Patients morning BP was elevated at 182/65 - this was before morning medication. Patient morning meds administered, BP rechecked and is now 154/68. CBC and BMP lab was drawn and sent per order.      Electronically signed by Yohan Graves RN on 5/11/2023 at 9:42 AM
Patient is alert & oriented x4, standby assist, 2/4 bed rails up, bed in lowest position, fall precautions in place, call light within reach. Pt remains on 3L of O2 nasal cannula. Pt's  is at bedside. Pt remains NPO for colonoscopy today.      Electronically signed by Lynn Wilkerson RN on 5/10/2023 at 10:38 AM
Patient is now ready for discharge, patient is A+Ox4 and  is at bedside. Discharge information discussed with the patient and her  with permission. R upper PICC line removed without complications, catheter was intake, no signs of bleeding, this RN held pressure for the first 5 minutes and patient was instructed to lay flat for 30 minutes. Patient agreeable. All discharge information was discussed and all questions answered.  to transport the patient home. Will discharge when patient leaves the floor.      Electronically signed by Gigi Francois RN on 5/11/2023 at 11:42 AM
Physician Progress Note      PATIENT:               Omer Michael  [facility] #:                  [id number]  :                       1945  ADMIT DATE:       2023 6:14 PM  [address] [place] DATE:        2023 12:52 PM  RESPONDING  PROVIDER #:        [place]          QUERY TEXT:    Patient admitted with melena. Noted documentation of Acute Kidney Injury in   GI consult note on 23. In order to support the diagnosis of MARK, please   include additional clinical indicators in your documentation. ? Or please   document if the diagnosis of MARK has been ruled out after further study. The medical record reflects the following:  Risk Factors: PMH- HTN, CKD 3  Clinical Indicators: CRT 1.7, 1.6. [place]ule[place]ka [place] GFR 31, 33 (Creatinine was 1.9 and GFR was   27 on 23,  ). Zuleyka Aleta [place] Per DS on prior admission on 23- CKD stage III   (baseline creatinine around 2.1-2.2)  Treatment: Monitor    Defined by Kidney Disease Improving Global Outcomes (KDIGO) clinical practice   guideline for acute kidney injury:  -Increase in SCr by greater than or equal to 0.3 mg/dl within 48 hours; or  -Increase or decrease in SCr to greater than or equal to 1.5 times baseline,   which is known or presumed to have occurred within the prior 7 days; or  -Urine volume < 0.5ml/kg/h for 6 hours. Thank Julio Cesar Romero RN BSN CDS CRCR  [email or web address]  Options provided:  -- Acute kidney injury evidenced by, Please document evidence as well as a   numerical baseline creatinine, if known. -- Acute kidney injury ruled out after study  -- Acute kidney injury ruled out after study, patient has CKD 3  -- Other - I will add my own diagnosis  -- Disagree - Not applicable / Not valid  -- Disagree - Clinically unable to determine / Unknown  -- Refer to Clinical Documentation Reviewer    PROVIDER RESPONSE TEXT:    Provider is clinically unable to determine a response to this query.     Query created by: Andria Pearson on 5/10/2023 11:41
Pt alert and oriented x4 back in room from PACU. Pt's  is at bedside. VSS. Standard safety precautions in place. 4L of O2 via nasal cannula remain in place.      Electronically signed by Charli Santillan RN on 5/10/2023 at 1:49 PM
Pt came from home with  d/t low H&H 1 unit of blood given, pt tolerated good, s/p H&H was drawn after blood transfusion and pt had critical lab , h&h 6.3 and 20.6 md notified who in turned ordered another unit of blood. Pt denies bleeding no bm noted on this shift, pt did have dried dark black stool noted in rectal  area. Pt is on 3 lnc which is baseline, she sleeps in the chair with bilateral legs elevated. Pt has picc line to right upper arm, flushes are easy, and blood return noted, Pt has prtonix drip in which she tolerating good. Pt   at bedside. Denies pain and sob. Call light within reach will continue to monitor.
Pt is due for coloscopy this am,pt had and extra large  yesterday d.t her bowel prep, bm was dark black, slimy consistency,H&H checked went up to 7.3 md notified she states no new orders, call light within reach  at the bedside
Report given to [facility] RN
Damari Gil is a 69 y.o. female who presents with     S/p 2 U PRBC transfusion  No chest pain or sob  No fever      Review of Systems:      Pertinent positives and negatives discussed in HPI    Objective: Intake/Output Summary (Last 24 hours) at 5/9/2023 1209  Last data filed at 5/9/2023 0515  Gross per 24 hour   Intake 671.83 ml   Output --   Net 671.83 ml      Vitals:   Vitals:    05/09/23 0515 05/09/23 0615 05/09/23 0747 05/09/23 0904   BP: (!) 163/66 115/78 (!) 154/87    Pulse: 91 88 86    Resp: 17 17 16    Temp: 98.6 °F (37 °C) 98.4 °F (36.9 °C) 98.1 °F (36.7 °C)    TempSrc:  Oral Oral    SpO2: 97% 98% 96% 95%   Weight:       Height:             Physical Exam:    General: NAD  Eyes: EOMI  ENT: neck supple  Cardiovascular: Regular rate. Respiratory: Clear to auscultation  Gastrointestinal: Soft, non tender  Genitourinary: no suprapubic tenderness  Musculoskeletal: No edema  Skin: warm, dry  Neuro: Alert. Psych: Mood appropriate.          Medications:   Medications:    [START ON 5/12/2023] pantoprazole (PROTONIX) 40 mg injection  40 mg IntraVENous Q12H    dilTIAZem  120 mg Oral Daily    dofetilide  250 mcg Oral 2 times per day    ferrous sulfate  324 mg Oral BID    hydrALAZINE  50 mg Oral 3 times per day    isosorbide mononitrate  120 mg Oral Daily    rosuvastatin  20 mg Oral Daily    tiotropium  2 puff Inhalation Daily    torsemide  40 mg Oral Daily    ranolazine  500 mg Oral BID    sodium chloride flush  5-40 mL IntraVENous 2 times per day    mometasone-formoterol  2 puff Inhalation BID    lidocaine 1 % injection  5 mL IntraDERmal Once    sodium chloride flush  5-40 mL IntraVENous 2 times per day      Infusions:    sodium chloride      sodium chloride      sodium chloride      sodium chloride       PRN Meds: albuterol, 2.5 mg, Q4H PRN  sodium chloride flush, 5-40 mL, PRN  sodium chloride, , PRN  acetaminophen, 650 mg, Q6H PRN   Or  acetaminophen, 650 mg, Q6H PRN  sodium chloride, , PRN  sodium
atraumatic, no cyanosis or edema  Skin: warm and dry, no jaundice  Neuro: Grossly intact, A&OX3    LABS AND IMAGING   Laboratory   Recent Labs     05/08/23  1851 05/09/23  0240 05/09/23  1037 05/10/23  0301 05/10/23  1135   WBC 9.2 11.6*  --   --  8.6   HGB 6.2* 6.3* 6.9* 7.3* 8.0*   HCT 19.1*  19.8* 20.6* 21.4* 23.1* 24.5*   MCV 92.1 90.4  --   --  89.4    193  --   --  183     Recent Labs     05/08/23  1052 05/08/23  1851 05/09/23  0240    139 141   K 3.8 3.7 3.4*   CL 98* 98* 100   CO2 33* 30 32   BUN 34* 35* 36*   CREATININE 1.7* 1.7* 1.6*     Recent Labs     05/08/23  1851   AST 16   ALT 9*   BILITOT 0.3   ALKPHOS 49     No results for input(s): LIPASE, AMYLASE in the last 72 hours. Recent Labs     05/08/23  1851   PROTIME 29.8*   INR 2.86*         Imaging  XR CHEST PORTABLE   Final Result   Mild diffuse interstitial type opacities. This is nonspecific but can be   seen with interstitial edema as well as atypical/viral infection. Endoscopy  Colonoscopy 5/10/23   1. Moderate Sigmoid Diverticulosis    EGD 4/26/23 with Dr. Rambo Crabtree  LA grade B esophagitis  4 mm nonbleeding AVM in the gastric body, obliterated with APC  Prominent mucosal fold adjacent to ampulla without evidence of adenoma or ulceration    ASSESSMENT AND RECOMMENDATIONS   Magalie Murrieta is a 69 y.o. female with PMH of of COPD on chronic oxygen, CAD s/p PCI on Plavix, atrial fibrillation on Xarelto, NYHA IV CHF s/p CardioMEMS, DVT, PE, AAA with history of endovascular repair, hypertension, hyperlipidemia and obesity who presented on 5/8/2023 with anemia    Acute on chronic anemia.  colonoscopy yesterday showed diverticulosis, no bleeding source. EGD 4/26/23 showed nonbleeding gastric AVM treated with APC. Suspect she may have additional AVMs in the small bowel. Continue iron supplementation.     NYHA class IV CHF: BNP elevated  Atrial fibrillation on Xarelto  CAD s/p PCI on Plavix  COPD: Severe, on chronic 2-3L oxygen  MARK
04/26/2023 07:30 AM    UROBILINOGEN 0.2 04/26/2023 07:30 AM    BILIRUBINUR Negative 04/26/2023 07:30 AM    BILIRUBINUR neg 07/19/2014 08:36 AM    BLOODU Negative 04/26/2023 07:30 AM    GLUCOSEU Negative 04/26/2023 07:30 AM    KETUA Negative 04/26/2023 07:30 AM     Urine Cultures:   Lab Results   Component Value Date/Time    LABURIN  01/23/2023 02:07 PM     <10,000 CFU/ml mixed skin/urogenital jon. No further workup     Blood Cultures:   Lab Results   Component Value Date/Time    BC No Growth after 4 days of incubation. 07/08/2022 09:30 PM     Lab Results   Component Value Date/Time    BLOODCULT2 No Growth after 4 days of incubation.  07/08/2022 10:12 PM     Organism:   Lab Results   Component Value Date/Time    ORG Proteus mirabilis 12/11/2022 07:30 PM    ORG Escherichia coli 12/11/2022 07:30 PM         Electronically signed by Annye Cushing, MD on 5/10/2023 at 10:46 AM

## 2023-05-18 ENCOUNTER — TELEPHONE (OUTPATIENT)
Dept: FAMILY MEDICINE CLINIC | Age: 78
End: 2023-05-18
Payer: MEDICARE

## 2023-05-18 DIAGNOSIS — D50.0 IRON DEFICIENCY ANEMIA SECONDARY TO BLOOD LOSS (CHRONIC): Primary | ICD-10-CM

## 2023-05-18 PROCEDURE — G0180 MD CERTIFICATION HHA PATIENT: HCPCS | Performed by: FAMILY MEDICINE

## 2023-05-19 ENCOUNTER — HOSPITAL ENCOUNTER (INPATIENT)
Age: 78
LOS: 11 days | Discharge: HOME HEALTH | DRG: 335 | End: 2023-05-30
Attending: EMERGENCY MEDICINE | Admitting: STUDENT IN AN ORGANIZED HEALTH CARE EDUCATION/TRAINING PROGRAM
Payer: MEDICARE

## 2023-05-19 ENCOUNTER — APPOINTMENT (OUTPATIENT)
Dept: GENERAL RADIOLOGY | Age: 78
DRG: 335 | End: 2023-05-19
Payer: MEDICARE

## 2023-05-19 DIAGNOSIS — K92.2 GASTROINTESTINAL HEMORRHAGE, UNSPECIFIED GASTROINTESTINAL HEMORRHAGE TYPE: Primary | ICD-10-CM

## 2023-05-19 DIAGNOSIS — D64.9 SYMPTOMATIC ANEMIA: ICD-10-CM

## 2023-05-19 DIAGNOSIS — I50.9 CONGESTIVE HEART FAILURE, UNSPECIFIED HF CHRONICITY, UNSPECIFIED HEART FAILURE TYPE (HCC): ICD-10-CM

## 2023-05-19 LAB
ABO + RH BLD: NORMAL
ALBUMIN SERPL-MCNC: 3.2 G/DL (ref 3.4–5)
ALBUMIN/GLOB SERPL: 1.9 {RATIO} (ref 1.1–2.2)
ALP SERPL-CCNC: 41 U/L (ref 40–129)
ALT SERPL-CCNC: 8 U/L (ref 10–40)
ANION GAP SERPL CALCULATED.3IONS-SCNC: 11 MMOL/L (ref 3–16)
APTT BLD: 32.6 SEC (ref 22.7–35.9)
AST SERPL-CCNC: 17 U/L (ref 15–37)
BACTERIA URNS QL MICRO: ABNORMAL /HPF
BILIRUB SERPL-MCNC: 0.3 MG/DL (ref 0–1)
BILIRUB UR QL STRIP.AUTO: NEGATIVE
BLD GP AB SCN SERPL QL: NORMAL
BUDDING YEAST: PRESENT
BUN SERPL-MCNC: 58 MG/DL (ref 7–20)
CALCIUM SERPL-MCNC: 8.7 MG/DL (ref 8.3–10.6)
CHARACTER UR: ABNORMAL
CHLORIDE SERPL-SCNC: 90 MMOL/L (ref 99–110)
CLARITY UR: ABNORMAL
CO2 SERPL-SCNC: 34 MMOL/L (ref 21–32)
COLOR UR: ABNORMAL
CREAT SERPL-MCNC: 2.1 MG/DL (ref 0.6–1.2)
EPI CELLS #/AREA URNS AUTO: 58 /HPF (ref 0–5)
FLUAV RNA UPPER RESP QL NAA+PROBE: NEGATIVE
FLUBV AG NPH QL: NEGATIVE
GFR SERPLBLD CREATININE-BSD FMLA CKD-EPI: 24 ML/MIN/{1.73_M2}
GLUCOSE SERPL-MCNC: 126 MG/DL (ref 70–99)
GLUCOSE UR STRIP.AUTO-MCNC: NEGATIVE MG/DL
HEMOCCULT STL QL: ABNORMAL
HGB UR QL STRIP.AUTO: NEGATIVE
HYALINE CASTS #/AREA URNS LPF: ABNORMAL /LPF (ref 0–2)
INR PPP: 2.49 (ref 0.84–1.16)
KETONES UR STRIP.AUTO-MCNC: ABNORMAL MG/DL
LEUKOCYTE ESTERASE UR QL STRIP.AUTO: ABNORMAL
NITRITE UR QL STRIP.AUTO: NEGATIVE
NT-PROBNP SERPL-MCNC: ABNORMAL PG/ML (ref 0–449)
PH UR STRIP.AUTO: 5 [PH] (ref 5–8)
POTASSIUM SERPL-SCNC: 3 MMOL/L (ref 3.5–5.1)
PROT SERPL-MCNC: 4.9 G/DL (ref 6.4–8.2)
PROT UR STRIP.AUTO-MCNC: 100 MG/DL
PROTHROMBIN TIME: 26.8 SEC (ref 11.5–14.8)
RBC #/AREA URNS HPF: ABNORMAL /HPF (ref 0–4)
SARS-COV-2 RDRP RESP QL NAA+PROBE: NOT DETECTED
SODIUM SERPL-SCNC: 135 MMOL/L (ref 136–145)
SP GR UR STRIP.AUTO: 1.02 (ref 1–1.03)
TROPONIN, HIGH SENSITIVITY: 174 NG/L (ref 0–14)
UA COMPLETE W REFLEX CULTURE PNL UR: ABNORMAL
UA DIPSTICK W REFLEX MICRO PNL UR: YES
URN SPEC COLLECT METH UR: ABNORMAL
UROBILINOGEN UR STRIP-ACNC: 0.2 E.U./DL
WBC #/AREA URNS HPF: ABNORMAL /HPF (ref 0–5)

## 2023-05-19 PROCEDURE — 85730 THROMBOPLASTIN TIME PARTIAL: CPT

## 2023-05-19 PROCEDURE — 83880 ASSAY OF NATRIURETIC PEPTIDE: CPT

## 2023-05-19 PROCEDURE — 71045 X-RAY EXAM CHEST 1 VIEW: CPT

## 2023-05-19 PROCEDURE — 85025 COMPLETE CBC W/AUTO DIFF WBC: CPT

## 2023-05-19 PROCEDURE — 2060000000 HC ICU INTERMEDIATE R&B

## 2023-05-19 PROCEDURE — 96374 THER/PROPH/DIAG INJ IV PUSH: CPT

## 2023-05-19 PROCEDURE — 2580000003 HC RX 258: Performed by: PHYSICIAN ASSISTANT

## 2023-05-19 PROCEDURE — 86920 COMPATIBILITY TEST SPIN: CPT

## 2023-05-19 PROCEDURE — 86900 BLOOD TYPING SEROLOGIC ABO: CPT

## 2023-05-19 PROCEDURE — 6360000002 HC RX W HCPCS: Performed by: PHYSICIAN ASSISTANT

## 2023-05-19 PROCEDURE — 96375 TX/PRO/DX INJ NEW DRUG ADDON: CPT

## 2023-05-19 PROCEDURE — 30233N1 TRANSFUSION OF NONAUTOLOGOUS RED BLOOD CELLS INTO PERIPHERAL VEIN, PERCUTANEOUS APPROACH: ICD-10-PCS | Performed by: INTERNAL MEDICINE

## 2023-05-19 PROCEDURE — 81001 URINALYSIS AUTO W/SCOPE: CPT

## 2023-05-19 PROCEDURE — P9016 RBC LEUKOCYTES REDUCED: HCPCS

## 2023-05-19 PROCEDURE — C9113 INJ PANTOPRAZOLE SODIUM, VIA: HCPCS | Performed by: PHYSICIAN ASSISTANT

## 2023-05-19 PROCEDURE — 84484 ASSAY OF TROPONIN QUANT: CPT

## 2023-05-19 PROCEDURE — 99285 EMERGENCY DEPT VISIT HI MDM: CPT

## 2023-05-19 PROCEDURE — 86850 RBC ANTIBODY SCREEN: CPT

## 2023-05-19 PROCEDURE — 85610 PROTHROMBIN TIME: CPT

## 2023-05-19 PROCEDURE — 93005 ELECTROCARDIOGRAM TRACING: CPT | Performed by: PHYSICIAN ASSISTANT

## 2023-05-19 PROCEDURE — 36415 COLL VENOUS BLD VENIPUNCTURE: CPT

## 2023-05-19 PROCEDURE — 80053 COMPREHEN METABOLIC PANEL: CPT

## 2023-05-19 PROCEDURE — 86901 BLOOD TYPING SEROLOGIC RH(D): CPT

## 2023-05-19 PROCEDURE — 87635 SARS-COV-2 COVID-19 AMP PRB: CPT

## 2023-05-19 PROCEDURE — 82270 OCCULT BLOOD FECES: CPT

## 2023-05-19 PROCEDURE — 87804 INFLUENZA ASSAY W/OPTIC: CPT

## 2023-05-19 PROCEDURE — 86923 COMPATIBILITY TEST ELECTRIC: CPT

## 2023-05-19 RX ORDER — FUROSEMIDE 10 MG/ML
60 INJECTION INTRAMUSCULAR; INTRAVENOUS ONCE
Status: DISCONTINUED | OUTPATIENT
Start: 2023-05-19 | End: 2023-05-19

## 2023-05-19 RX ORDER — FUROSEMIDE 10 MG/ML
40 INJECTION INTRAMUSCULAR; INTRAVENOUS ONCE
Status: COMPLETED | OUTPATIENT
Start: 2023-05-19 | End: 2023-05-19

## 2023-05-19 RX ORDER — ONDANSETRON 2 MG/ML
4 INJECTION INTRAMUSCULAR; INTRAVENOUS EVERY 6 HOURS PRN
Status: CANCELLED | OUTPATIENT
Start: 2023-05-19

## 2023-05-19 RX ORDER — SODIUM CHLORIDE 9 MG/ML
INJECTION, SOLUTION INTRAVENOUS PRN
Status: DISCONTINUED | OUTPATIENT
Start: 2023-05-19 | End: 2023-05-22

## 2023-05-19 RX ORDER — POTASSIUM CHLORIDE 7.45 MG/ML
10 INJECTION INTRAVENOUS ONCE
Status: COMPLETED | OUTPATIENT
Start: 2023-05-19 | End: 2023-05-19

## 2023-05-19 RX ADMIN — FUROSEMIDE 40 MG: 10 INJECTION, SOLUTION INTRAMUSCULAR; INTRAVENOUS at 21:22

## 2023-05-19 RX ADMIN — PANTOPRAZOLE SODIUM 80 MG: 40 INJECTION, POWDER, FOR SOLUTION INTRAVENOUS at 22:36

## 2023-05-19 RX ADMIN — PANTOPRAZOLE SODIUM 8 MG/HR: 40 INJECTION, POWDER, FOR SOLUTION INTRAVENOUS at 23:09

## 2023-05-19 RX ADMIN — POTASSIUM CHLORIDE 10 MEQ: 7.46 INJECTION, SOLUTION INTRAVENOUS at 21:26

## 2023-05-19 ASSESSMENT — ENCOUNTER SYMPTOMS
ABDOMINAL PAIN: 0
VOMITING: 0
SHORTNESS OF BREATH: 1
COUGH: 0

## 2023-05-19 ASSESSMENT — PAIN SCALES - GENERAL: PAINLEVEL_OUTOF10: 5

## 2023-05-19 ASSESSMENT — LIFESTYLE VARIABLES
HOW MANY STANDARD DRINKS CONTAINING ALCOHOL DO YOU HAVE ON A TYPICAL DAY: PATIENT DOES NOT DRINK
HOW OFTEN DO YOU HAVE A DRINK CONTAINING ALCOHOL: NEVER

## 2023-05-19 ASSESSMENT — PAIN - FUNCTIONAL ASSESSMENT: PAIN_FUNCTIONAL_ASSESSMENT: 0-10

## 2023-05-20 PROBLEM — N17.9 AKI (ACUTE KIDNEY INJURY) (HCC): Status: ACTIVE | Noted: 2023-05-20

## 2023-05-20 LAB
ALBUMIN SERPL-MCNC: 3.4 G/DL (ref 3.4–5)
ANION GAP SERPL CALCULATED.3IONS-SCNC: 12 MMOL/L (ref 3–16)
ANISOCYTOSIS BLD QL SMEAR: ABNORMAL
BASOPHILS # BLD: 0 K/UL (ref 0–0.2)
BASOPHILS # BLD: 0 K/UL (ref 0–0.2)
BASOPHILS NFR BLD: 0 %
BASOPHILS NFR BLD: 0.1 %
BLOOD BANK DISPENSE STATUS: NORMAL
BLOOD BANK DISPENSE STATUS: NORMAL
BLOOD BANK PRODUCT CODE: NORMAL
BLOOD BANK PRODUCT CODE: NORMAL
BPU ID: NORMAL
BPU ID: NORMAL
BUN SERPL-MCNC: 63 MG/DL (ref 7–20)
CALCIUM SERPL-MCNC: 8.5 MG/DL (ref 8.3–10.6)
CHLORIDE SERPL-SCNC: 90 MMOL/L (ref 99–110)
CO2 SERPL-SCNC: 34 MMOL/L (ref 21–32)
CREAT SERPL-MCNC: 1.9 MG/DL (ref 0.6–1.2)
DEPRECATED RDW RBC AUTO: 18 % (ref 12.4–15.4)
DEPRECATED RDW RBC AUTO: 18.1 % (ref 12.4–15.4)
DESCRIPTION BLOOD BANK: NORMAL
DESCRIPTION BLOOD BANK: NORMAL
EOSINOPHIL # BLD: 0 K/UL (ref 0–0.6)
EOSINOPHIL # BLD: 0 K/UL (ref 0–0.6)
EOSINOPHIL NFR BLD: 0 %
EOSINOPHIL NFR BLD: 0 %
GFR SERPLBLD CREATININE-BSD FMLA CKD-EPI: 27 ML/MIN/{1.73_M2}
GLUCOSE SERPL-MCNC: 150 MG/DL (ref 70–99)
HCT VFR BLD AUTO: 18.8 % (ref 36–48)
HCT VFR BLD AUTO: 19.6 % (ref 36–48)
HCT VFR BLD AUTO: 20.4 % (ref 36–48)
HCT VFR BLD AUTO: 20.5 % (ref 36–48)
HGB BLD-MCNC: 6.4 G/DL (ref 12–16)
HGB BLD-MCNC: 6.5 G/DL (ref 12–16)
HGB BLD-MCNC: 6.7 G/DL (ref 12–16)
HGB BLD-MCNC: 6.8 G/DL (ref 12–16)
LYMPHOCYTES # BLD: 0.3 K/UL (ref 1–5.1)
LYMPHOCYTES # BLD: 0.4 K/UL (ref 1–5.1)
LYMPHOCYTES NFR BLD: 2 %
LYMPHOCYTES NFR BLD: 4.1 %
MAGNESIUM SERPL-MCNC: 1.9 MG/DL (ref 1.8–2.4)
MCH RBC QN AUTO: 30.5 PG (ref 26–34)
MCH RBC QN AUTO: 30.9 PG (ref 26–34)
MCHC RBC AUTO-ENTMCNC: 33.1 G/DL (ref 31–36)
MCHC RBC AUTO-ENTMCNC: 34.5 G/DL (ref 31–36)
MCV RBC AUTO: 89.5 FL (ref 80–100)
MCV RBC AUTO: 92.1 FL (ref 80–100)
MONOCYTES # BLD: 0.1 K/UL (ref 0–1.3)
MONOCYTES # BLD: 1.2 K/UL (ref 0–1.3)
MONOCYTES NFR BLD: 1.2 %
MONOCYTES NFR BLD: 7 %
NEUTROPHILS # BLD: 15.4 K/UL (ref 1.7–7.7)
NEUTROPHILS # BLD: 9.9 K/UL (ref 1.7–7.7)
NEUTROPHILS NFR BLD: 91 %
NEUTROPHILS NFR BLD: 94.6 %
PHOSPHATE SERPL-MCNC: 5.3 MG/DL (ref 2.5–4.9)
PLATELET # BLD AUTO: 207 K/UL (ref 135–450)
PLATELET # BLD AUTO: 217 K/UL (ref 135–450)
PMV BLD AUTO: 10 FL (ref 5–10.5)
PMV BLD AUTO: 9.8 FL (ref 5–10.5)
POTASSIUM SERPL-SCNC: 3.1 MMOL/L (ref 3.5–5.1)
RBC # BLD AUTO: 2.1 M/UL (ref 4–5.2)
RBC # BLD AUTO: 2.21 M/UL (ref 4–5.2)
SODIUM SERPL-SCNC: 136 MMOL/L (ref 136–145)
WBC # BLD AUTO: 10.5 K/UL (ref 4–11)
WBC # BLD AUTO: 16.9 K/UL (ref 4–11)

## 2023-05-20 PROCEDURE — 6370000000 HC RX 637 (ALT 250 FOR IP): Performed by: STUDENT IN AN ORGANIZED HEALTH CARE EDUCATION/TRAINING PROGRAM

## 2023-05-20 PROCEDURE — 85025 COMPLETE CBC W/AUTO DIFF WBC: CPT

## 2023-05-20 PROCEDURE — 94761 N-INVAS EAR/PLS OXIMETRY MLT: CPT

## 2023-05-20 PROCEDURE — 94640 AIRWAY INHALATION TREATMENT: CPT

## 2023-05-20 PROCEDURE — 85014 HEMATOCRIT: CPT

## 2023-05-20 PROCEDURE — 6360000002 HC RX W HCPCS: Performed by: NURSE PRACTITIONER

## 2023-05-20 PROCEDURE — 2060000000 HC ICU INTERMEDIATE R&B

## 2023-05-20 PROCEDURE — 36430 TRANSFUSION BLD/BLD COMPNT: CPT

## 2023-05-20 PROCEDURE — 6360000002 HC RX W HCPCS: Performed by: STUDENT IN AN ORGANIZED HEALTH CARE EDUCATION/TRAINING PROGRAM

## 2023-05-20 PROCEDURE — 80069 RENAL FUNCTION PANEL: CPT

## 2023-05-20 PROCEDURE — 2700000000 HC OXYGEN THERAPY PER DAY

## 2023-05-20 PROCEDURE — 85018 HEMOGLOBIN: CPT

## 2023-05-20 PROCEDURE — 36415 COLL VENOUS BLD VENIPUNCTURE: CPT

## 2023-05-20 PROCEDURE — 2580000003 HC RX 258: Performed by: STUDENT IN AN ORGANIZED HEALTH CARE EDUCATION/TRAINING PROGRAM

## 2023-05-20 PROCEDURE — 83735 ASSAY OF MAGNESIUM: CPT

## 2023-05-20 PROCEDURE — C9113 INJ PANTOPRAZOLE SODIUM, VIA: HCPCS | Performed by: STUDENT IN AN ORGANIZED HEALTH CARE EDUCATION/TRAINING PROGRAM

## 2023-05-20 RX ORDER — DILTIAZEM HYDROCHLORIDE 120 MG/1
120 CAPSULE, COATED, EXTENDED RELEASE ORAL DAILY
Status: DISCONTINUED | OUTPATIENT
Start: 2023-05-20 | End: 2023-05-30 | Stop reason: HOSPADM

## 2023-05-20 RX ORDER — DOFETILIDE 0.25 MG/1
250 CAPSULE ORAL EVERY 12 HOURS SCHEDULED
Status: DISCONTINUED | OUTPATIENT
Start: 2023-05-20 | End: 2023-05-29 | Stop reason: SDUPTHER

## 2023-05-20 RX ORDER — ACETAMINOPHEN 650 MG/1
650 SUPPOSITORY RECTAL EVERY 6 HOURS PRN
Status: DISCONTINUED | OUTPATIENT
Start: 2023-05-20 | End: 2023-05-30 | Stop reason: HOSPADM

## 2023-05-20 RX ORDER — ROSUVASTATIN CALCIUM 20 MG/1
20 TABLET, COATED ORAL DAILY
Status: DISCONTINUED | OUTPATIENT
Start: 2023-05-20 | End: 2023-05-30 | Stop reason: HOSPADM

## 2023-05-20 RX ORDER — SODIUM CHLORIDE 9 MG/ML
INJECTION, SOLUTION INTRAVENOUS PRN
Status: DISCONTINUED | OUTPATIENT
Start: 2023-05-20 | End: 2023-05-22

## 2023-05-20 RX ORDER — POTASSIUM CHLORIDE 7.45 MG/ML
10 INJECTION INTRAVENOUS
Status: DISPENSED | OUTPATIENT
Start: 2023-05-20 | End: 2023-05-20

## 2023-05-20 RX ORDER — ACETAMINOPHEN 325 MG/1
650 TABLET ORAL EVERY 6 HOURS PRN
Status: DISCONTINUED | OUTPATIENT
Start: 2023-05-20 | End: 2023-05-30 | Stop reason: HOSPADM

## 2023-05-20 RX ORDER — RANOLAZINE 500 MG/1
500 TABLET, EXTENDED RELEASE ORAL 2 TIMES DAILY
Status: DISCONTINUED | OUTPATIENT
Start: 2023-05-20 | End: 2023-05-30 | Stop reason: HOSPADM

## 2023-05-20 RX ORDER — SODIUM CHLORIDE 0.9 % (FLUSH) 0.9 %
5-40 SYRINGE (ML) INJECTION EVERY 12 HOURS SCHEDULED
Status: DISCONTINUED | OUTPATIENT
Start: 2023-05-20 | End: 2023-05-30 | Stop reason: HOSPADM

## 2023-05-20 RX ORDER — SODIUM CHLORIDE 0.9 % (FLUSH) 0.9 %
5-40 SYRINGE (ML) INJECTION PRN
Status: DISCONTINUED | OUTPATIENT
Start: 2023-05-20 | End: 2023-05-30 | Stop reason: HOSPADM

## 2023-05-20 RX ORDER — SODIUM CHLORIDE 9 MG/ML
INJECTION, SOLUTION INTRAVENOUS PRN
Status: DISCONTINUED | OUTPATIENT
Start: 2023-05-20 | End: 2023-05-30 | Stop reason: HOSPADM

## 2023-05-20 RX ORDER — FUROSEMIDE 10 MG/ML
20 INJECTION INTRAMUSCULAR; INTRAVENOUS 2 TIMES DAILY
Status: DISCONTINUED | OUTPATIENT
Start: 2023-05-20 | End: 2023-05-21

## 2023-05-20 RX ADMIN — FUROSEMIDE 20 MG: 10 INJECTION, SOLUTION INTRAMUSCULAR; INTRAVENOUS at 17:53

## 2023-05-20 RX ADMIN — DOFETILIDE 250 MCG: 0.25 CAPSULE ORAL at 10:33

## 2023-05-20 RX ADMIN — PANTOPRAZOLE SODIUM 8 MG/HR: 40 INJECTION, POWDER, FOR SOLUTION INTRAVENOUS at 22:23

## 2023-05-20 RX ADMIN — MOMETASONE FUROATE AND FORMOTEROL FUMARATE DIHYDRATE 2 PUFF: 200; 5 AEROSOL RESPIRATORY (INHALATION) at 08:27

## 2023-05-20 RX ADMIN — RANOLAZINE 500 MG: 500 TABLET, FILM COATED, EXTENDED RELEASE ORAL at 10:27

## 2023-05-20 RX ADMIN — DOFETILIDE 250 MCG: 0.25 CAPSULE ORAL at 22:17

## 2023-05-20 RX ADMIN — ROSUVASTATIN CALCIUM 20 MG: 20 TABLET, FILM COATED ORAL at 10:27

## 2023-05-20 RX ADMIN — PANTOPRAZOLE SODIUM 8 MG/HR: 40 INJECTION, POWDER, FOR SOLUTION INTRAVENOUS at 13:48

## 2023-05-20 RX ADMIN — Medication 10 MEQ: at 10:51

## 2023-05-20 RX ADMIN — DILTIAZEM HYDROCHLORIDE 120 MG: 120 CAPSULE, COATED, EXTENDED RELEASE ORAL at 10:27

## 2023-05-20 RX ADMIN — Medication 10 MEQ: at 14:40

## 2023-05-20 RX ADMIN — TIOTROPIUM BROMIDE INHALATION SPRAY 2 PUFF: 3.12 SPRAY, METERED RESPIRATORY (INHALATION) at 08:27

## 2023-05-20 RX ADMIN — SODIUM CHLORIDE, PRESERVATIVE FREE 10 ML: 5 INJECTION INTRAVENOUS at 11:44

## 2023-05-20 RX ADMIN — RANOLAZINE 500 MG: 500 TABLET, FILM COATED, EXTENDED RELEASE ORAL at 01:39

## 2023-05-20 RX ADMIN — MOMETASONE FUROATE AND FORMOTEROL FUMARATE DIHYDRATE 2 PUFF: 200; 5 AEROSOL RESPIRATORY (INHALATION) at 20:04

## 2023-05-20 RX ADMIN — FUROSEMIDE 20 MG: 10 INJECTION, SOLUTION INTRAMUSCULAR; INTRAVENOUS at 10:27

## 2023-05-20 RX ADMIN — Medication 10 MEQ: at 18:54

## 2023-05-20 RX ADMIN — RANOLAZINE 500 MG: 500 TABLET, FILM COATED, EXTENDED RELEASE ORAL at 22:17

## 2023-05-20 ASSESSMENT — PAIN SCALES - WONG BAKER
WONGBAKER_NUMERICALRESPONSE: 0

## 2023-05-20 ASSESSMENT — PAIN SCALES - GENERAL
PAINLEVEL_OUTOF10: 0
PAINLEVEL_OUTOF10: 0

## 2023-05-21 LAB
ALBUMIN SERPL-MCNC: 3 G/DL (ref 3.4–5)
ANION GAP SERPL CALCULATED.3IONS-SCNC: 9 MMOL/L (ref 3–16)
BASOPHILS # BLD: 0 K/UL (ref 0–0.2)
BASOPHILS # BLD: 0 K/UL (ref 0–0.2)
BASOPHILS # BLD: 0.1 K/UL (ref 0–0.2)
BASOPHILS NFR BLD: 0.1 %
BASOPHILS NFR BLD: 0.2 %
BASOPHILS NFR BLD: 0.4 %
BLOOD BANK DISPENSE STATUS: NORMAL
BLOOD BANK DISPENSE STATUS: NORMAL
BLOOD BANK PRODUCT CODE: NORMAL
BLOOD BANK PRODUCT CODE: NORMAL
BPU ID: NORMAL
BPU ID: NORMAL
BUN SERPL-MCNC: 91 MG/DL (ref 7–20)
CALCIUM SERPL-MCNC: 8.2 MG/DL (ref 8.3–10.6)
CHLORIDE SERPL-SCNC: 92 MMOL/L (ref 99–110)
CO2 SERPL-SCNC: 36 MMOL/L (ref 21–32)
CREAT SERPL-MCNC: 2.1 MG/DL (ref 0.6–1.2)
DEPRECATED RDW RBC AUTO: 16.1 % (ref 12.4–15.4)
DEPRECATED RDW RBC AUTO: 16.7 % (ref 12.4–15.4)
DEPRECATED RDW RBC AUTO: 17.4 % (ref 12.4–15.4)
DESCRIPTION BLOOD BANK: NORMAL
DESCRIPTION BLOOD BANK: NORMAL
EKG ATRIAL RATE: 107 BPM
EKG DIAGNOSIS: NORMAL
EKG P AXIS: 54 DEGREES
EKG P-R INTERVAL: 284 MS
EKG Q-T INTERVAL: 270 MS
EKG QRS DURATION: 86 MS
EKG QTC CALCULATION (BAZETT): 360 MS
EKG R AXIS: -5 DEGREES
EKG T AXIS: 191 DEGREES
EKG VENTRICULAR RATE: 107 BPM
EOSINOPHIL # BLD: 0 K/UL (ref 0–0.6)
EOSINOPHIL # BLD: 0.1 K/UL (ref 0–0.6)
EOSINOPHIL # BLD: 0.2 K/UL (ref 0–0.6)
EOSINOPHIL NFR BLD: 0.1 %
EOSINOPHIL NFR BLD: 0.5 %
EOSINOPHIL NFR BLD: 0.8 %
GFR SERPLBLD CREATININE-BSD FMLA CKD-EPI: 24 ML/MIN/{1.73_M2}
GLUCOSE SERPL-MCNC: 111 MG/DL (ref 70–99)
HCT VFR BLD AUTO: 18.3 % (ref 36–48)
HCT VFR BLD AUTO: 22.2 % (ref 36–48)
HCT VFR BLD AUTO: 24.5 % (ref 36–48)
HGB BLD-MCNC: 6.1 G/DL (ref 12–16)
HGB BLD-MCNC: 7.2 G/DL (ref 12–16)
HGB BLD-MCNC: 8.2 G/DL (ref 12–16)
LYMPHOCYTES # BLD: 0.8 K/UL (ref 1–5.1)
LYMPHOCYTES # BLD: 0.9 K/UL (ref 1–5.1)
LYMPHOCYTES # BLD: 1 K/UL (ref 1–5.1)
LYMPHOCYTES NFR BLD: 5.4 %
LYMPHOCYTES NFR BLD: 5.4 %
LYMPHOCYTES NFR BLD: 5.6 %
MAGNESIUM SERPL-MCNC: 1.9 MG/DL (ref 1.8–2.4)
MCH RBC QN AUTO: 29.4 PG (ref 26–34)
MCH RBC QN AUTO: 30 PG (ref 26–34)
MCH RBC QN AUTO: 30.2 PG (ref 26–34)
MCHC RBC AUTO-ENTMCNC: 32.6 G/DL (ref 31–36)
MCHC RBC AUTO-ENTMCNC: 33.3 G/DL (ref 31–36)
MCHC RBC AUTO-ENTMCNC: 33.5 G/DL (ref 31–36)
MCV RBC AUTO: 89.5 FL (ref 80–100)
MCV RBC AUTO: 90.2 FL (ref 80–100)
MCV RBC AUTO: 90.8 FL (ref 80–100)
MONOCYTES # BLD: 1.3 K/UL (ref 0–1.3)
MONOCYTES # BLD: 1.3 K/UL (ref 0–1.3)
MONOCYTES # BLD: 1.5 K/UL (ref 0–1.3)
MONOCYTES NFR BLD: 7.8 %
MONOCYTES NFR BLD: 7.8 %
MONOCYTES NFR BLD: 8.1 %
NEUTROPHILS # BLD: 13.3 K/UL (ref 1.7–7.7)
NEUTROPHILS # BLD: 14.4 K/UL (ref 1.7–7.7)
NEUTROPHILS # BLD: 16.5 K/UL (ref 1.7–7.7)
NEUTROPHILS NFR BLD: 85.6 %
NEUTROPHILS NFR BLD: 85.9 %
NEUTROPHILS NFR BLD: 86.3 %
PHOSPHATE SERPL-MCNC: 4.6 MG/DL (ref 2.5–4.9)
PLATELET # BLD AUTO: 195 K/UL (ref 135–450)
PLATELET # BLD AUTO: 198 K/UL (ref 135–450)
PLATELET # BLD AUTO: 203 K/UL (ref 135–450)
PMV BLD AUTO: 9.5 FL (ref 5–10.5)
PMV BLD AUTO: 9.8 FL (ref 5–10.5)
PMV BLD AUTO: 9.8 FL (ref 5–10.5)
POTASSIUM SERPL-SCNC: 3.4 MMOL/L (ref 3.5–5.1)
PROCALCITONIN SERPL IA-MCNC: 0.14 NG/ML (ref 0–0.15)
RBC # BLD AUTO: 2.02 M/UL (ref 4–5.2)
RBC # BLD AUTO: 2.46 M/UL (ref 4–5.2)
RBC # BLD AUTO: 2.74 M/UL (ref 4–5.2)
SODIUM SERPL-SCNC: 137 MMOL/L (ref 136–145)
WBC # BLD AUTO: 15.5 K/UL (ref 4–11)
WBC # BLD AUTO: 16.7 K/UL (ref 4–11)
WBC # BLD AUTO: 19.3 K/UL (ref 4–11)

## 2023-05-21 PROCEDURE — 83735 ASSAY OF MAGNESIUM: CPT

## 2023-05-21 PROCEDURE — 6370000000 HC RX 637 (ALT 250 FOR IP): Performed by: STUDENT IN AN ORGANIZED HEALTH CARE EDUCATION/TRAINING PROGRAM

## 2023-05-21 PROCEDURE — 6360000002 HC RX W HCPCS: Performed by: STUDENT IN AN ORGANIZED HEALTH CARE EDUCATION/TRAINING PROGRAM

## 2023-05-21 PROCEDURE — 6370000000 HC RX 637 (ALT 250 FOR IP): Performed by: NURSE PRACTITIONER

## 2023-05-21 PROCEDURE — 6360000002 HC RX W HCPCS: Performed by: NURSE PRACTITIONER

## 2023-05-21 PROCEDURE — 84145 PROCALCITONIN (PCT): CPT

## 2023-05-21 PROCEDURE — 93010 ELECTROCARDIOGRAM REPORT: CPT | Performed by: INTERNAL MEDICINE

## 2023-05-21 PROCEDURE — C9113 INJ PANTOPRAZOLE SODIUM, VIA: HCPCS | Performed by: STUDENT IN AN ORGANIZED HEALTH CARE EDUCATION/TRAINING PROGRAM

## 2023-05-21 PROCEDURE — 6370000000 HC RX 637 (ALT 250 FOR IP): Performed by: INTERNAL MEDICINE

## 2023-05-21 PROCEDURE — 2060000000 HC ICU INTERMEDIATE R&B

## 2023-05-21 PROCEDURE — 80069 RENAL FUNCTION PANEL: CPT

## 2023-05-21 PROCEDURE — 99222 1ST HOSP IP/OBS MODERATE 55: CPT | Performed by: NURSE PRACTITIONER

## 2023-05-21 PROCEDURE — 85025 COMPLETE CBC W/AUTO DIFF WBC: CPT

## 2023-05-21 PROCEDURE — 84165 PROTEIN E-PHORESIS SERUM: CPT

## 2023-05-21 PROCEDURE — 2580000003 HC RX 258: Performed by: STUDENT IN AN ORGANIZED HEALTH CARE EDUCATION/TRAINING PROGRAM

## 2023-05-21 PROCEDURE — 94640 AIRWAY INHALATION TREATMENT: CPT

## 2023-05-21 PROCEDURE — 2700000000 HC OXYGEN THERAPY PER DAY

## 2023-05-21 PROCEDURE — 84155 ASSAY OF PROTEIN SERUM: CPT

## 2023-05-21 PROCEDURE — 2580000003 HC RX 258: Performed by: NURSE PRACTITIONER

## 2023-05-21 PROCEDURE — 36430 TRANSFUSION BLD/BLD COMPNT: CPT

## 2023-05-21 PROCEDURE — 36415 COLL VENOUS BLD VENIPUNCTURE: CPT

## 2023-05-21 PROCEDURE — 94760 N-INVAS EAR/PLS OXIMETRY 1: CPT

## 2023-05-21 RX ORDER — POTASSIUM CHLORIDE 20 MEQ/1
40 TABLET, EXTENDED RELEASE ORAL PRN
Status: DISCONTINUED | OUTPATIENT
Start: 2023-05-21 | End: 2023-05-21

## 2023-05-21 RX ORDER — SODIUM CHLORIDE 9 MG/ML
INJECTION, SOLUTION INTRAVENOUS PRN
Status: DISCONTINUED | OUTPATIENT
Start: 2023-05-21 | End: 2023-05-22

## 2023-05-21 RX ORDER — POTASSIUM CHLORIDE 7.45 MG/ML
10 INJECTION INTRAVENOUS
Status: COMPLETED | OUTPATIENT
Start: 2023-05-21 | End: 2023-05-21

## 2023-05-21 RX ORDER — POTASSIUM CHLORIDE 7.45 MG/ML
10 INJECTION INTRAVENOUS PRN
Status: DISCONTINUED | OUTPATIENT
Start: 2023-05-21 | End: 2023-05-21

## 2023-05-21 RX ORDER — POTASSIUM CHLORIDE 20 MEQ/1
40 TABLET, EXTENDED RELEASE ORAL ONCE
Status: COMPLETED | OUTPATIENT
Start: 2023-05-21 | End: 2023-05-21

## 2023-05-21 RX ADMIN — PANTOPRAZOLE SODIUM 8 MG/HR: 40 INJECTION, POWDER, FOR SOLUTION INTRAVENOUS at 09:51

## 2023-05-21 RX ADMIN — MOMETASONE FUROATE AND FORMOTEROL FUMARATE DIHYDRATE 2 PUFF: 200; 5 AEROSOL RESPIRATORY (INHALATION) at 08:07

## 2023-05-21 RX ADMIN — RANOLAZINE 500 MG: 500 TABLET, FILM COATED, EXTENDED RELEASE ORAL at 20:52

## 2023-05-21 RX ADMIN — POTASSIUM CHLORIDE 10 MEQ: 7.46 INJECTION, SOLUTION INTRAVENOUS at 01:11

## 2023-05-21 RX ADMIN — DILTIAZEM HYDROCHLORIDE 120 MG: 120 CAPSULE, COATED, EXTENDED RELEASE ORAL at 08:36

## 2023-05-21 RX ADMIN — FUROSEMIDE 5 MG/HR: 10 INJECTION, SOLUTION INTRAMUSCULAR; INTRAVENOUS at 12:05

## 2023-05-21 RX ADMIN — SODIUM CHLORIDE, PRESERVATIVE FREE 10 ML: 5 INJECTION INTRAVENOUS at 08:36

## 2023-05-21 RX ADMIN — FUROSEMIDE 20 MG: 10 INJECTION, SOLUTION INTRAMUSCULAR; INTRAVENOUS at 08:36

## 2023-05-21 RX ADMIN — DOFETILIDE 250 MCG: 0.25 CAPSULE ORAL at 08:45

## 2023-05-21 RX ADMIN — ROSUVASTATIN CALCIUM 20 MG: 20 TABLET, FILM COATED ORAL at 08:36

## 2023-05-21 RX ADMIN — PANTOPRAZOLE SODIUM 8 MG/HR: 40 INJECTION, POWDER, FOR SOLUTION INTRAVENOUS at 23:34

## 2023-05-21 RX ADMIN — TIOTROPIUM BROMIDE INHALATION SPRAY 2 PUFF: 3.12 SPRAY, METERED RESPIRATORY (INHALATION) at 08:07

## 2023-05-21 RX ADMIN — MOMETASONE FUROATE AND FORMOTEROL FUMARATE DIHYDRATE 2 PUFF: 200; 5 AEROSOL RESPIRATORY (INHALATION) at 20:11

## 2023-05-21 RX ADMIN — RANOLAZINE 500 MG: 500 TABLET, FILM COATED, EXTENDED RELEASE ORAL at 08:36

## 2023-05-21 RX ADMIN — POTASSIUM CHLORIDE 40 MEQ: 1500 TABLET, EXTENDED RELEASE ORAL at 14:03

## 2023-05-21 RX ADMIN — DOFETILIDE 250 MCG: 0.25 CAPSULE ORAL at 20:52

## 2023-05-21 RX ADMIN — POLYETHYLENE GLYCOL-3350 AND ELECTROLYTES 2000 ML: 236; 6.74; 5.86; 2.97; 22.74 POWDER, FOR SOLUTION ORAL at 18:21

## 2023-05-21 ASSESSMENT — PAIN SCALES - WONG BAKER
WONGBAKER_NUMERICALRESPONSE: 0

## 2023-05-21 ASSESSMENT — PAIN SCALES - GENERAL: PAINLEVEL_OUTOF10: 0

## 2023-05-22 ENCOUNTER — APPOINTMENT (OUTPATIENT)
Dept: GENERAL RADIOLOGY | Age: 78
DRG: 335 | End: 2023-05-22
Payer: MEDICARE

## 2023-05-22 LAB
ALBUMIN SERPL-MCNC: 2.9 G/DL (ref 3.4–5)
ANION GAP SERPL CALCULATED.3IONS-SCNC: 11 MMOL/L (ref 3–16)
ANION GAP SERPL CALCULATED.3IONS-SCNC: 9 MMOL/L (ref 3–16)
BACTERIA URNS QL MICRO: ABNORMAL /HPF
BASOPHILS # BLD: 0 K/UL (ref 0–0.2)
BASOPHILS # BLD: 0.1 K/UL (ref 0–0.2)
BASOPHILS NFR BLD: 0.2 %
BASOPHILS NFR BLD: 0.2 %
BASOPHILS NFR BLD: 0.3 %
BASOPHILS NFR BLD: 0.5 %
BILIRUB UR QL STRIP.AUTO: NEGATIVE
BLOOD BANK DISPENSE STATUS: NORMAL
BLOOD BANK PRODUCT CODE: NORMAL
BPU ID: NORMAL
BUN SERPL-MCNC: 73 MG/DL (ref 7–20)
BUN SERPL-MCNC: 96 MG/DL (ref 7–20)
CALCIUM SERPL-MCNC: 7.8 MG/DL (ref 8.3–10.6)
CALCIUM SERPL-MCNC: 8 MG/DL (ref 8.3–10.6)
CHLORIDE SERPL-SCNC: 89 MMOL/L (ref 99–110)
CHLORIDE SERPL-SCNC: 91 MMOL/L (ref 99–110)
CLARITY UR: CLEAR
CO2 SERPL-SCNC: 38 MMOL/L (ref 21–32)
CO2 SERPL-SCNC: 39 MMOL/L (ref 21–32)
COLOR UR: YELLOW
CREAT SERPL-MCNC: 2 MG/DL (ref 0.6–1.2)
CREAT SERPL-MCNC: 2 MG/DL (ref 0.6–1.2)
DEPRECATED RDW RBC AUTO: 16.3 % (ref 12.4–15.4)
DEPRECATED RDW RBC AUTO: 16.4 % (ref 12.4–15.4)
DEPRECATED RDW RBC AUTO: 16.6 % (ref 12.4–15.4)
DEPRECATED RDW RBC AUTO: 20.4 % (ref 12.4–15.4)
DESCRIPTION BLOOD BANK: NORMAL
EOSINOPHIL # BLD: 0.1 K/UL (ref 0–0.6)
EOSINOPHIL # BLD: 0.5 K/UL (ref 0–0.6)
EOSINOPHIL # BLD: 0.7 K/UL (ref 0–0.6)
EOSINOPHIL # BLD: 0.8 K/UL (ref 0–0.6)
EOSINOPHIL NFR BLD: 0.5 %
EOSINOPHIL NFR BLD: 3.1 %
EOSINOPHIL NFR BLD: 4.5 %
EOSINOPHIL NFR BLD: 6.1 %
EPI CELLS #/AREA URNS AUTO: 0 /HPF (ref 0–5)
GFR SERPLBLD CREATININE-BSD FMLA CKD-EPI: 25 ML/MIN/{1.73_M2}
GFR SERPLBLD CREATININE-BSD FMLA CKD-EPI: 25 ML/MIN/{1.73_M2}
GLUCOSE SERPL-MCNC: 110 MG/DL (ref 70–99)
GLUCOSE SERPL-MCNC: 125 MG/DL (ref 70–99)
GLUCOSE UR STRIP.AUTO-MCNC: NEGATIVE MG/DL
HCT VFR BLD AUTO: 14 % (ref 36–48)
HCT VFR BLD AUTO: 18.8 % (ref 36–48)
HCT VFR BLD AUTO: 20.6 % (ref 36–48)
HCT VFR BLD AUTO: 22.6 % (ref 36–48)
HGB BLD-MCNC: 4.4 G/DL (ref 12–16)
HGB BLD-MCNC: 6.3 G/DL (ref 12–16)
HGB BLD-MCNC: 6.7 G/DL (ref 12–16)
HGB BLD-MCNC: 7.3 G/DL (ref 12–16)
HGB UR QL STRIP.AUTO: ABNORMAL
HYALINE CASTS #/AREA URNS AUTO: 0 /LPF (ref 0–8)
KETONES UR STRIP.AUTO-MCNC: NEGATIVE MG/DL
LEUKOCYTE ESTERASE UR QL STRIP.AUTO: ABNORMAL
LYMPHOCYTES # BLD: 0.5 K/UL (ref 1–5.1)
LYMPHOCYTES # BLD: 0.9 K/UL (ref 1–5.1)
LYMPHOCYTES # BLD: 0.9 K/UL (ref 1–5.1)
LYMPHOCYTES # BLD: 1 K/UL (ref 1–5.1)
LYMPHOCYTES NFR BLD: 3.9 %
LYMPHOCYTES NFR BLD: 5.2 %
LYMPHOCYTES NFR BLD: 6.1 %
LYMPHOCYTES NFR BLD: 6.9 %
MAGNESIUM SERPL-MCNC: 1.7 MG/DL (ref 1.8–2.4)
MAGNESIUM SERPL-MCNC: 2.1 MG/DL (ref 1.8–2.4)
MCH RBC QN AUTO: 29.5 PG (ref 26–34)
MCH RBC QN AUTO: 29.6 PG (ref 26–34)
MCH RBC QN AUTO: 30.1 PG (ref 26–34)
MCH RBC QN AUTO: 30.9 PG (ref 26–34)
MCHC RBC AUTO-ENTMCNC: 31.5 G/DL (ref 31–36)
MCHC RBC AUTO-ENTMCNC: 32.4 G/DL (ref 31–36)
MCHC RBC AUTO-ENTMCNC: 32.6 G/DL (ref 31–36)
MCHC RBC AUTO-ENTMCNC: 33.6 G/DL (ref 31–36)
MCV RBC AUTO: 90.6 FL (ref 80–100)
MCV RBC AUTO: 91 FL (ref 80–100)
MCV RBC AUTO: 92.1 FL (ref 80–100)
MCV RBC AUTO: 95.5 FL (ref 80–100)
MONOCYTES # BLD: 0.6 K/UL (ref 0–1.3)
MONOCYTES # BLD: 1.1 K/UL (ref 0–1.3)
MONOCYTES # BLD: 1.4 K/UL (ref 0–1.3)
MONOCYTES # BLD: 1.4 K/UL (ref 0–1.3)
MONOCYTES NFR BLD: 4.4 %
MONOCYTES NFR BLD: 8.1 %
MONOCYTES NFR BLD: 8.1 %
MONOCYTES NFR BLD: 8.6 %
NEUTROPHILS # BLD: 10.8 K/UL (ref 1.7–7.7)
NEUTROPHILS # BLD: 11.9 K/UL (ref 1.7–7.7)
NEUTROPHILS # BLD: 13.2 K/UL (ref 1.7–7.7)
NEUTROPHILS # BLD: 13.7 K/UL (ref 1.7–7.7)
NEUTROPHILS NFR BLD: 78.7 %
NEUTROPHILS NFR BLD: 81.7 %
NEUTROPHILS NFR BLD: 82 %
NEUTROPHILS NFR BLD: 90.9 %
NITRITE UR QL STRIP.AUTO: NEGATIVE
PATH INTERP BLD-IMP: NORMAL
PATH INTERP BLD-IMP: YES
PH UR STRIP.AUTO: 7 [PH] (ref 5–8)
PHOSPHATE SERPL-MCNC: 3.7 MG/DL (ref 2.5–4.9)
PLATELET # BLD AUTO: 147 K/UL (ref 135–450)
PLATELET # BLD AUTO: 160 K/UL (ref 135–450)
PLATELET # BLD AUTO: 163 K/UL (ref 135–450)
PLATELET # BLD AUTO: 235 K/UL (ref 135–450)
PMV BLD AUTO: 10 FL (ref 5–10.5)
PMV BLD AUTO: 9.5 FL (ref 5–10.5)
PMV BLD AUTO: 9.7 FL (ref 5–10.5)
PMV BLD AUTO: 9.9 FL (ref 5–10.5)
POTASSIUM SERPL-SCNC: 2.5 MMOL/L (ref 3.5–5.1)
POTASSIUM SERPL-SCNC: 2.6 MMOL/L (ref 3.5–5.1)
PROT UR STRIP.AUTO-MCNC: NEGATIVE MG/DL
RBC # BLD AUTO: 1.46 M/UL (ref 4–5.2)
RBC # BLD AUTO: 2.04 M/UL (ref 4–5.2)
RBC # BLD AUTO: 2.28 M/UL (ref 4–5.2)
RBC # BLD AUTO: 2.48 M/UL (ref 4–5.2)
RBC CLUMPS #/AREA URNS AUTO: 0 /HPF (ref 0–4)
SODIUM SERPL-SCNC: 138 MMOL/L (ref 136–145)
SODIUM SERPL-SCNC: 139 MMOL/L (ref 136–145)
SP GR UR STRIP.AUTO: 1.01 (ref 1–1.03)
UA COMPLETE W REFLEX CULTURE PNL UR: YES
UA DIPSTICK W REFLEX MICRO PNL UR: YES
URN SPEC COLLECT METH UR: ABNORMAL
UROBILINOGEN UR STRIP-ACNC: 0.2 E.U./DL
WBC # BLD AUTO: 13.1 K/UL (ref 4–11)
WBC # BLD AUTO: 13.7 K/UL (ref 4–11)
WBC # BLD AUTO: 16.1 K/UL (ref 4–11)
WBC # BLD AUTO: 16.8 K/UL (ref 4–11)
WBC #/AREA URNS AUTO: 18 /HPF (ref 0–5)

## 2023-05-22 PROCEDURE — 76937 US GUIDE VASCULAR ACCESS: CPT

## 2023-05-22 PROCEDURE — 71045 X-RAY EXAM CHEST 1 VIEW: CPT

## 2023-05-22 PROCEDURE — 6370000000 HC RX 637 (ALT 250 FOR IP): Performed by: INTERNAL MEDICINE

## 2023-05-22 PROCEDURE — C9113 INJ PANTOPRAZOLE SODIUM, VIA: HCPCS | Performed by: STUDENT IN AN ORGANIZED HEALTH CARE EDUCATION/TRAINING PROGRAM

## 2023-05-22 PROCEDURE — 83735 ASSAY OF MAGNESIUM: CPT

## 2023-05-22 PROCEDURE — C1751 CATH, INF, PER/CENT/MIDLINE: HCPCS

## 2023-05-22 PROCEDURE — 87077 CULTURE AEROBIC IDENTIFY: CPT

## 2023-05-22 PROCEDURE — 6370000000 HC RX 637 (ALT 250 FOR IP): Performed by: STUDENT IN AN ORGANIZED HEALTH CARE EDUCATION/TRAINING PROGRAM

## 2023-05-22 PROCEDURE — 2720000010 HC SURG SUPPLY STERILE: Performed by: INTERNAL MEDICINE

## 2023-05-22 PROCEDURE — 94640 AIRWAY INHALATION TREATMENT: CPT

## 2023-05-22 PROCEDURE — 2580000003 HC RX 258: Performed by: STUDENT IN AN ORGANIZED HEALTH CARE EDUCATION/TRAINING PROGRAM

## 2023-05-22 PROCEDURE — 87186 SC STD MICRODIL/AGAR DIL: CPT

## 2023-05-22 PROCEDURE — 87086 URINE CULTURE/COLONY COUNT: CPT

## 2023-05-22 PROCEDURE — 6360000002 HC RX W HCPCS: Performed by: STUDENT IN AN ORGANIZED HEALTH CARE EDUCATION/TRAINING PROGRAM

## 2023-05-22 PROCEDURE — 6370000000 HC RX 637 (ALT 250 FOR IP): Performed by: NURSE PRACTITIONER

## 2023-05-22 PROCEDURE — 85025 COMPLETE CBC W/AUTO DIFF WBC: CPT

## 2023-05-22 PROCEDURE — 36569 INSJ PICC 5 YR+ W/O IMAGING: CPT

## 2023-05-22 PROCEDURE — 81001 URINALYSIS AUTO W/SCOPE: CPT

## 2023-05-22 PROCEDURE — 02HV33Z INSERTION OF INFUSION DEVICE INTO SUPERIOR VENA CAVA, PERCUTANEOUS APPROACH: ICD-10-PCS | Performed by: INTERNAL MEDICINE

## 2023-05-22 PROCEDURE — 6360000002 HC RX W HCPCS: Performed by: INTERNAL MEDICINE

## 2023-05-22 PROCEDURE — 36430 TRANSFUSION BLD/BLD COMPNT: CPT

## 2023-05-22 PROCEDURE — 80069 RENAL FUNCTION PANEL: CPT

## 2023-05-22 PROCEDURE — 3609019000 HC EGD CAPSULE ENDOSCOPY: Performed by: INTERNAL MEDICINE

## 2023-05-22 PROCEDURE — 99233 SBSQ HOSP IP/OBS HIGH 50: CPT | Performed by: NURSE PRACTITIONER

## 2023-05-22 PROCEDURE — 2060000000 HC ICU INTERMEDIATE R&B

## 2023-05-22 PROCEDURE — 36415 COLL VENOUS BLD VENIPUNCTURE: CPT

## 2023-05-22 PROCEDURE — 94760 N-INVAS EAR/PLS OXIMETRY 1: CPT

## 2023-05-22 PROCEDURE — 6360000002 HC RX W HCPCS: Performed by: NURSE PRACTITIONER

## 2023-05-22 PROCEDURE — 2700000000 HC OXYGEN THERAPY PER DAY

## 2023-05-22 RX ORDER — LIDOCAINE HYDROCHLORIDE 10 MG/ML
5 INJECTION, SOLUTION EPIDURAL; INFILTRATION; INTRACAUDAL; PERINEURAL ONCE
Status: DISCONTINUED | OUTPATIENT
Start: 2023-05-22 | End: 2023-05-30 | Stop reason: HOSPADM

## 2023-05-22 RX ORDER — SODIUM CHLORIDE 0.9 % (FLUSH) 0.9 %
5-40 SYRINGE (ML) INJECTION PRN
Status: DISCONTINUED | OUTPATIENT
Start: 2023-05-22 | End: 2023-05-22

## 2023-05-22 RX ORDER — SODIUM CHLORIDE 9 MG/ML
INJECTION, SOLUTION INTRAVENOUS PRN
Status: DISCONTINUED | OUTPATIENT
Start: 2023-05-22 | End: 2023-05-22

## 2023-05-22 RX ORDER — POTASSIUM CHLORIDE 750 MG/1
10 TABLET, FILM COATED, EXTENDED RELEASE ORAL
Status: DISCONTINUED | OUTPATIENT
Start: 2023-05-22 | End: 2023-05-22

## 2023-05-22 RX ORDER — POTASSIUM CHLORIDE 7.45 MG/ML
10 INJECTION INTRAVENOUS PRN
Status: DISCONTINUED | OUTPATIENT
Start: 2023-05-22 | End: 2023-05-22

## 2023-05-22 RX ORDER — POTASSIUM CHLORIDE 7.45 MG/ML
10 INJECTION INTRAVENOUS
Status: COMPLETED | OUTPATIENT
Start: 2023-05-22 | End: 2023-05-22

## 2023-05-22 RX ORDER — SODIUM CHLORIDE 9 MG/ML
25 INJECTION, SOLUTION INTRAVENOUS PRN
Status: DISCONTINUED | OUTPATIENT
Start: 2023-05-22 | End: 2023-05-22

## 2023-05-22 RX ORDER — SODIUM CHLORIDE 0.9 % (FLUSH) 0.9 %
5-40 SYRINGE (ML) INJECTION EVERY 12 HOURS SCHEDULED
Status: DISCONTINUED | OUTPATIENT
Start: 2023-05-22 | End: 2023-05-22

## 2023-05-22 RX ORDER — MAGNESIUM SULFATE IN WATER 40 MG/ML
2000 INJECTION, SOLUTION INTRAVENOUS PRN
Status: DISCONTINUED | OUTPATIENT
Start: 2023-05-22 | End: 2023-05-22

## 2023-05-22 RX ORDER — DIPHENHYDRAMINE HYDROCHLORIDE, ZINC ACETATE 2; .1 G/100G; G/100G
CREAM TOPICAL 3 TIMES DAILY PRN
Status: DISCONTINUED | OUTPATIENT
Start: 2023-05-22 | End: 2023-05-30 | Stop reason: HOSPADM

## 2023-05-22 RX ORDER — SODIUM CHLORIDE 9 MG/ML
INJECTION, SOLUTION INTRAVENOUS PRN
Status: DISCONTINUED | OUTPATIENT
Start: 2023-05-22 | End: 2023-05-30 | Stop reason: HOSPADM

## 2023-05-22 RX ORDER — SODIUM CHLORIDE 9 MG/ML
INJECTION, SOLUTION INTRAVENOUS PRN
Status: DISCONTINUED | OUTPATIENT
Start: 2023-05-22 | End: 2023-05-27

## 2023-05-22 RX ORDER — POTASSIUM CHLORIDE 20 MEQ/1
40 TABLET, EXTENDED RELEASE ORAL PRN
Status: DISCONTINUED | OUTPATIENT
Start: 2023-05-22 | End: 2023-05-22

## 2023-05-22 RX ORDER — HYDROXYZINE PAMOATE 25 MG/1
25 CAPSULE ORAL ONCE
Status: COMPLETED | OUTPATIENT
Start: 2023-05-22 | End: 2023-05-22

## 2023-05-22 RX ORDER — PANTOPRAZOLE SODIUM 40 MG/10ML
40 INJECTION, POWDER, LYOPHILIZED, FOR SOLUTION INTRAVENOUS 2 TIMES DAILY
Status: DISCONTINUED | OUTPATIENT
Start: 2023-05-23 | End: 2023-05-23

## 2023-05-22 RX ORDER — MAGNESIUM SULFATE IN WATER 40 MG/ML
2000 INJECTION, SOLUTION INTRAVENOUS ONCE
Status: COMPLETED | OUTPATIENT
Start: 2023-05-22 | End: 2023-05-22

## 2023-05-22 RX ORDER — POTASSIUM CHLORIDE 20 MEQ/1
40 TABLET, EXTENDED RELEASE ORAL ONCE
Status: COMPLETED | OUTPATIENT
Start: 2023-05-22 | End: 2023-05-22

## 2023-05-22 RX ORDER — SPIRONOLACTONE 25 MG/1
25 TABLET ORAL DAILY
Status: DISCONTINUED | OUTPATIENT
Start: 2023-05-22 | End: 2023-05-30 | Stop reason: HOSPADM

## 2023-05-22 RX ORDER — SODIUM CHLORIDE 9 MG/ML
INJECTION, SOLUTION INTRAVENOUS PRN
Status: DISCONTINUED | OUTPATIENT
Start: 2023-05-22 | End: 2023-05-23

## 2023-05-22 RX ADMIN — MAGNESIUM SULFATE HEPTAHYDRATE 2000 MG: 40 INJECTION, SOLUTION INTRAVENOUS at 11:12

## 2023-05-22 RX ADMIN — MOMETASONE FUROATE AND FORMOTEROL FUMARATE DIHYDRATE 2 PUFF: 200; 5 AEROSOL RESPIRATORY (INHALATION) at 08:42

## 2023-05-22 RX ADMIN — Medication 10 MEQ: at 18:53

## 2023-05-22 RX ADMIN — HYDROXYZINE PAMOATE 25 MG: 25 CAPSULE ORAL at 23:30

## 2023-05-22 RX ADMIN — MOMETASONE FUROATE AND FORMOTEROL FUMARATE DIHYDRATE 2 PUFF: 200; 5 AEROSOL RESPIRATORY (INHALATION) at 20:22

## 2023-05-22 RX ADMIN — DOFETILIDE 250 MCG: 0.25 CAPSULE ORAL at 10:51

## 2023-05-22 RX ADMIN — PANTOPRAZOLE SODIUM 8 MG/HR: 40 INJECTION, POWDER, FOR SOLUTION INTRAVENOUS at 17:59

## 2023-05-22 RX ADMIN — POTASSIUM CHLORIDE 40 MEQ: 1500 TABLET, EXTENDED RELEASE ORAL at 13:01

## 2023-05-22 RX ADMIN — Medication 10 MEQ: at 19:58

## 2023-05-22 RX ADMIN — ROSUVASTATIN CALCIUM 20 MG: 20 TABLET, FILM COATED ORAL at 10:51

## 2023-05-22 RX ADMIN — POTASSIUM CHLORIDE 40 MEQ: 1500 TABLET, EXTENDED RELEASE ORAL at 10:51

## 2023-05-22 RX ADMIN — TIOTROPIUM BROMIDE INHALATION SPRAY 2 PUFF: 3.12 SPRAY, METERED RESPIRATORY (INHALATION) at 08:42

## 2023-05-22 RX ADMIN — Medication 10 MEQ: at 17:56

## 2023-05-22 RX ADMIN — RANOLAZINE 500 MG: 500 TABLET, FILM COATED, EXTENDED RELEASE ORAL at 10:51

## 2023-05-22 RX ADMIN — SPIRONOLACTONE 25 MG: 25 TABLET ORAL at 18:00

## 2023-05-22 RX ADMIN — RANOLAZINE 500 MG: 500 TABLET, FILM COATED, EXTENDED RELEASE ORAL at 22:01

## 2023-05-22 RX ADMIN — Medication 10 MEQ: at 20:49

## 2023-05-22 RX ADMIN — DOFETILIDE 250 MCG: 0.25 CAPSULE ORAL at 22:01

## 2023-05-22 RX ADMIN — DILTIAZEM HYDROCHLORIDE 120 MG: 120 CAPSULE, COATED, EXTENDED RELEASE ORAL at 10:51

## 2023-05-22 ASSESSMENT — PAIN SCALES - WONG BAKER
WONGBAKER_NUMERICALRESPONSE: 0

## 2023-05-22 ASSESSMENT — ENCOUNTER SYMPTOMS
GASTROINTESTINAL NEGATIVE: 1
COUGH: 1
SHORTNESS OF BREATH: 0

## 2023-05-23 LAB
ABO + RH BLD: NORMAL
ABO + RH BLD: NORMAL
ALBUMIN SERPL-MCNC: 3 G/DL (ref 3.4–5)
ANION GAP SERPL CALCULATED.3IONS-SCNC: 7 MMOL/L (ref 3–16)
BASOPHILS # BLD: 0 K/UL (ref 0–0.2)
BASOPHILS # BLD: 0 K/UL (ref 0–0.2)
BASOPHILS # BLD: 0.1 K/UL (ref 0–0.2)
BASOPHILS # BLD: 0.1 K/UL (ref 0–0.2)
BASOPHILS NFR BLD: 0.2 %
BASOPHILS NFR BLD: 0.3 %
BASOPHILS NFR BLD: 0.4 %
BASOPHILS NFR BLD: 0.5 %
BLD GP AB SCN SERPL QL: NORMAL
BUN SERPL-MCNC: 79 MG/DL (ref 7–20)
CALCIUM SERPL-MCNC: 7.5 MG/DL (ref 8.3–10.6)
CHLORIDE SERPL-SCNC: 92 MMOL/L (ref 99–110)
CO2 SERPL-SCNC: 41 MMOL/L (ref 21–32)
CREAT SERPL-MCNC: 1.8 MG/DL (ref 0.6–1.2)
DEPRECATED RDW RBC AUTO: 15.4 % (ref 12.4–15.4)
DEPRECATED RDW RBC AUTO: 15.6 % (ref 12.4–15.4)
DEPRECATED RDW RBC AUTO: 15.7 % (ref 12.4–15.4)
DEPRECATED RDW RBC AUTO: 16 % (ref 12.4–15.4)
EOSINOPHIL # BLD: 0.7 K/UL (ref 0–0.6)
EOSINOPHIL # BLD: 0.9 K/UL (ref 0–0.6)
EOSINOPHIL # BLD: 0.9 K/UL (ref 0–0.6)
EOSINOPHIL # BLD: 1.1 K/UL (ref 0–0.6)
EOSINOPHIL NFR BLD: 5.1 %
EOSINOPHIL NFR BLD: 6.1 %
EOSINOPHIL NFR BLD: 6.9 %
EOSINOPHIL NFR BLD: 7.3 %
GFR SERPLBLD CREATININE-BSD FMLA CKD-EPI: 29 ML/MIN/{1.73_M2}
GLUCOSE SERPL-MCNC: 120 MG/DL (ref 70–99)
HCT VFR BLD AUTO: 22.5 % (ref 36–48)
HCT VFR BLD AUTO: 22.7 % (ref 36–48)
HCT VFR BLD AUTO: 23.5 % (ref 36–48)
HCT VFR BLD AUTO: 24.4 % (ref 36–48)
HGB BLD-MCNC: 7.5 G/DL (ref 12–16)
HGB BLD-MCNC: 7.7 G/DL (ref 12–16)
HGB BLD-MCNC: 7.9 G/DL (ref 12–16)
HGB BLD-MCNC: 8.2 G/DL (ref 12–16)
LYMPHOCYTES # BLD: 0.8 K/UL (ref 1–5.1)
LYMPHOCYTES # BLD: 1 K/UL (ref 1–5.1)
LYMPHOCYTES NFR BLD: 5.3 %
LYMPHOCYTES NFR BLD: 5.5 %
LYMPHOCYTES NFR BLD: 6.1 %
LYMPHOCYTES NFR BLD: 6.2 %
MAGNESIUM SERPL-MCNC: 1.9 MG/DL (ref 1.8–2.4)
MCH RBC QN AUTO: 30 PG (ref 26–34)
MCH RBC QN AUTO: 30.7 PG (ref 26–34)
MCH RBC QN AUTO: 30.8 PG (ref 26–34)
MCH RBC QN AUTO: 31 PG (ref 26–34)
MCHC RBC AUTO-ENTMCNC: 33.3 G/DL (ref 31–36)
MCHC RBC AUTO-ENTMCNC: 33.6 G/DL (ref 31–36)
MCHC RBC AUTO-ENTMCNC: 33.7 G/DL (ref 31–36)
MCHC RBC AUTO-ENTMCNC: 33.9 G/DL (ref 31–36)
MCV RBC AUTO: 90.1 FL (ref 80–100)
MCV RBC AUTO: 91.2 FL (ref 80–100)
MCV RBC AUTO: 91.6 FL (ref 80–100)
MCV RBC AUTO: 91.6 FL (ref 80–100)
MONOCYTES # BLD: 0.9 K/UL (ref 0–1.3)
MONOCYTES # BLD: 1.1 K/UL (ref 0–1.3)
MONOCYTES # BLD: 1.1 K/UL (ref 0–1.3)
MONOCYTES # BLD: 1.3 K/UL (ref 0–1.3)
MONOCYTES NFR BLD: 6.3 %
MONOCYTES NFR BLD: 7.7 %
MONOCYTES NFR BLD: 7.7 %
MONOCYTES NFR BLD: 8.5 %
NEUTROPHILS # BLD: 11.6 K/UL (ref 1.7–7.7)
NEUTROPHILS # BLD: 11.8 K/UL (ref 1.7–7.7)
NEUTROPHILS # BLD: 12.8 K/UL (ref 1.7–7.7)
NEUTROPHILS # BLD: 9.7 K/UL (ref 1.7–7.7)
NEUTROPHILS NFR BLD: 77.8 %
NEUTROPHILS NFR BLD: 78.7 %
NEUTROPHILS NFR BLD: 80.7 %
NEUTROPHILS NFR BLD: 82.7 %
PHOSPHATE SERPL-MCNC: 3 MG/DL (ref 2.5–4.9)
PLATELET # BLD AUTO: 128 K/UL (ref 135–450)
PLATELET # BLD AUTO: 137 K/UL (ref 135–450)
PLATELET # BLD AUTO: 142 K/UL (ref 135–450)
PLATELET # BLD AUTO: 145 K/UL (ref 135–450)
PMV BLD AUTO: 10.1 FL (ref 5–10.5)
PMV BLD AUTO: 10.2 FL (ref 5–10.5)
PMV BLD AUTO: 9.6 FL (ref 5–10.5)
PMV BLD AUTO: 9.8 FL (ref 5–10.5)
POTASSIUM SERPL-SCNC: 2.8 MMOL/L (ref 3.5–5.1)
RBC # BLD AUTO: 2.48 M/UL (ref 4–5.2)
RBC # BLD AUTO: 2.5 M/UL (ref 4–5.2)
RBC # BLD AUTO: 2.58 M/UL (ref 4–5.2)
RBC # BLD AUTO: 2.67 M/UL (ref 4–5.2)
SODIUM SERPL-SCNC: 140 MMOL/L (ref 136–145)
WBC # BLD AUTO: 12.5 K/UL (ref 4–11)
WBC # BLD AUTO: 14.2 K/UL (ref 4–11)
WBC # BLD AUTO: 14.4 K/UL (ref 4–11)
WBC # BLD AUTO: 16.2 K/UL (ref 4–11)

## 2023-05-23 PROCEDURE — 2580000003 HC RX 258: Performed by: INTERNAL MEDICINE

## 2023-05-23 PROCEDURE — 6360000002 HC RX W HCPCS: Performed by: NURSE PRACTITIONER

## 2023-05-23 PROCEDURE — C9113 INJ PANTOPRAZOLE SODIUM, VIA: HCPCS | Performed by: INTERNAL MEDICINE

## 2023-05-23 PROCEDURE — 6370000000 HC RX 637 (ALT 250 FOR IP): Performed by: NURSE PRACTITIONER

## 2023-05-23 PROCEDURE — 83735 ASSAY OF MAGNESIUM: CPT

## 2023-05-23 PROCEDURE — 94760 N-INVAS EAR/PLS OXIMETRY 1: CPT

## 2023-05-23 PROCEDURE — 2700000000 HC OXYGEN THERAPY PER DAY

## 2023-05-23 PROCEDURE — 2580000003 HC RX 258: Performed by: STUDENT IN AN ORGANIZED HEALTH CARE EDUCATION/TRAINING PROGRAM

## 2023-05-23 PROCEDURE — 97162 PT EVAL MOD COMPLEX 30 MIN: CPT | Performed by: PHYSICAL THERAPIST

## 2023-05-23 PROCEDURE — 97166 OT EVAL MOD COMPLEX 45 MIN: CPT

## 2023-05-23 PROCEDURE — 85025 COMPLETE CBC W/AUTO DIFF WBC: CPT

## 2023-05-23 PROCEDURE — 6370000000 HC RX 637 (ALT 250 FOR IP): Performed by: INTERNAL MEDICINE

## 2023-05-23 PROCEDURE — 6370000000 HC RX 637 (ALT 250 FOR IP): Performed by: STUDENT IN AN ORGANIZED HEALTH CARE EDUCATION/TRAINING PROGRAM

## 2023-05-23 PROCEDURE — 6360000002 HC RX W HCPCS: Performed by: INTERNAL MEDICINE

## 2023-05-23 PROCEDURE — 86850 RBC ANTIBODY SCREEN: CPT

## 2023-05-23 PROCEDURE — 2500000003 HC RX 250 WO HCPCS: Performed by: NURSE PRACTITIONER

## 2023-05-23 PROCEDURE — 99232 SBSQ HOSP IP/OBS MODERATE 35: CPT | Performed by: NURSE PRACTITIONER

## 2023-05-23 PROCEDURE — 86923 COMPATIBILITY TEST ELECTRIC: CPT

## 2023-05-23 PROCEDURE — P9016 RBC LEUKOCYTES REDUCED: HCPCS

## 2023-05-23 PROCEDURE — 80069 RENAL FUNCTION PANEL: CPT

## 2023-05-23 PROCEDURE — 97535 SELF CARE MNGMENT TRAINING: CPT

## 2023-05-23 PROCEDURE — 97530 THERAPEUTIC ACTIVITIES: CPT

## 2023-05-23 PROCEDURE — 2580000003 HC RX 258: Performed by: NURSE PRACTITIONER

## 2023-05-23 PROCEDURE — 2060000000 HC ICU INTERMEDIATE R&B

## 2023-05-23 PROCEDURE — 86901 BLOOD TYPING SEROLOGIC RH(D): CPT

## 2023-05-23 PROCEDURE — 94640 AIRWAY INHALATION TREATMENT: CPT

## 2023-05-23 PROCEDURE — 97530 THERAPEUTIC ACTIVITIES: CPT | Performed by: PHYSICAL THERAPIST

## 2023-05-23 PROCEDURE — 86900 BLOOD TYPING SEROLOGIC ABO: CPT

## 2023-05-23 RX ORDER — POTASSIUM CHLORIDE 7.45 MG/ML
10 INJECTION INTRAVENOUS PRN
Status: DISCONTINUED | OUTPATIENT
Start: 2023-05-23 | End: 2023-05-30 | Stop reason: HOSPADM

## 2023-05-23 RX ORDER — ISOSORBIDE MONONITRATE 30 MG/1
15 TABLET, EXTENDED RELEASE ORAL DAILY
Status: DISCONTINUED | OUTPATIENT
Start: 2023-05-24 | End: 2023-05-28

## 2023-05-23 RX ORDER — POTASSIUM CHLORIDE 20 MEQ/1
40 TABLET, EXTENDED RELEASE ORAL PRN
Status: DISCONTINUED | OUTPATIENT
Start: 2023-05-23 | End: 2023-05-30 | Stop reason: HOSPADM

## 2023-05-23 RX ORDER — HYDRALAZINE HYDROCHLORIDE 25 MG/1
12.5 TABLET, FILM COATED ORAL EVERY 12 HOURS SCHEDULED
Status: DISCONTINUED | OUTPATIENT
Start: 2023-05-23 | End: 2023-05-27

## 2023-05-23 RX ORDER — POTASSIUM CHLORIDE 20 MEQ/1
40 TABLET, EXTENDED RELEASE ORAL ONCE
Status: COMPLETED | OUTPATIENT
Start: 2023-05-23 | End: 2023-05-23

## 2023-05-23 RX ORDER — POTASSIUM CHLORIDE 7.45 MG/ML
10 INJECTION INTRAVENOUS
Status: DISPENSED | OUTPATIENT
Start: 2023-05-23 | End: 2023-05-23

## 2023-05-23 RX ORDER — DIPHENHYDRAMINE HYDROCHLORIDE 50 MG/ML
25 INJECTION INTRAMUSCULAR; INTRAVENOUS EVERY 6 HOURS PRN
Status: DISPENSED | OUTPATIENT
Start: 2023-05-23 | End: 2023-05-24

## 2023-05-23 RX ORDER — NITROFURANTOIN 25; 75 MG/1; MG/1
100 CAPSULE ORAL EVERY 12 HOURS SCHEDULED
Status: DISCONTINUED | OUTPATIENT
Start: 2023-05-23 | End: 2023-05-23

## 2023-05-23 RX ADMIN — RANOLAZINE 500 MG: 500 TABLET, FILM COATED, EXTENDED RELEASE ORAL at 09:47

## 2023-05-23 RX ADMIN — MOMETASONE FUROATE AND FORMOTEROL FUMARATE DIHYDRATE 2 PUFF: 200; 5 AEROSOL RESPIRATORY (INHALATION) at 08:49

## 2023-05-23 RX ADMIN — DOFETILIDE 250 MCG: 0.25 CAPSULE ORAL at 20:44

## 2023-05-23 RX ADMIN — POTASSIUM CHLORIDE 10 MEQ: 7.46 INJECTION, SOLUTION INTRAVENOUS at 15:30

## 2023-05-23 RX ADMIN — HYDRALAZINE HYDROCHLORIDE 12.5 MG: 25 TABLET, FILM COATED ORAL at 20:43

## 2023-05-23 RX ADMIN — DIPHENHYDRAMINE HYDROCHLORIDE 25 MG: 50 INJECTION, SOLUTION INTRAMUSCULAR; INTRAVENOUS at 13:32

## 2023-05-23 RX ADMIN — POTASSIUM CHLORIDE 40 MEQ: 1500 TABLET, EXTENDED RELEASE ORAL at 09:47

## 2023-05-23 RX ADMIN — PANTOPRAZOLE SODIUM 8 MG/HR: 40 INJECTION, POWDER, FOR SOLUTION INTRAVENOUS at 15:31

## 2023-05-23 RX ADMIN — MICONAZOLE NITRATE: 2 POWDER TOPICAL at 20:44

## 2023-05-23 RX ADMIN — ROSUVASTATIN CALCIUM 20 MG: 20 TABLET, FILM COATED ORAL at 09:47

## 2023-05-23 RX ADMIN — DOFETILIDE 250 MCG: 0.25 CAPSULE ORAL at 09:47

## 2023-05-23 RX ADMIN — SODIUM CHLORIDE, PRESERVATIVE FREE 10 ML: 5 INJECTION INTRAVENOUS at 20:45

## 2023-05-23 RX ADMIN — RANOLAZINE 500 MG: 500 TABLET, FILM COATED, EXTENDED RELEASE ORAL at 20:43

## 2023-05-23 RX ADMIN — POTASSIUM CHLORIDE 10 MEQ: 7.46 INJECTION, SOLUTION INTRAVENOUS at 13:42

## 2023-05-23 RX ADMIN — MOMETASONE FUROATE AND FORMOTEROL FUMARATE DIHYDRATE 2 PUFF: 200; 5 AEROSOL RESPIRATORY (INHALATION) at 20:26

## 2023-05-23 RX ADMIN — FUROSEMIDE 5 MG/HR: 10 INJECTION, SOLUTION INTRAMUSCULAR; INTRAVENOUS at 19:14

## 2023-05-23 RX ADMIN — SPIRONOLACTONE 25 MG: 25 TABLET ORAL at 09:47

## 2023-05-23 RX ADMIN — FUROSEMIDE 5 MG/HR: 10 INJECTION, SOLUTION INTRAMUSCULAR; INTRAVENOUS at 19:54

## 2023-05-23 RX ADMIN — TIOTROPIUM BROMIDE INHALATION SPRAY 2 PUFF: 3.12 SPRAY, METERED RESPIRATORY (INHALATION) at 08:49

## 2023-05-23 RX ADMIN — SODIUM CHLORIDE, PRESERVATIVE FREE 10 ML: 5 INJECTION INTRAVENOUS at 13:43

## 2023-05-23 RX ADMIN — DILTIAZEM HYDROCHLORIDE 120 MG: 120 CAPSULE, COATED, EXTENDED RELEASE ORAL at 09:47

## 2023-05-23 RX ADMIN — POTASSIUM CHLORIDE 10 MEQ: 7.46 INJECTION, SOLUTION INTRAVENOUS at 16:39

## 2023-05-23 RX ADMIN — PANTOPRAZOLE SODIUM 8 MG/HR: 40 INJECTION, POWDER, FOR SOLUTION INTRAVENOUS at 05:10

## 2023-05-23 RX ADMIN — DIPHENHYDRAMINE HYDROCHLORIDE 25 MG: 50 INJECTION, SOLUTION INTRAMUSCULAR; INTRAVENOUS at 23:19

## 2023-05-23 RX ADMIN — FUROSEMIDE 5 MG/HR: 10 INJECTION, SOLUTION INTRAMUSCULAR; INTRAVENOUS at 03:06

## 2023-05-23 RX ADMIN — CEFTRIAXONE SODIUM 2000 MG: 2 INJECTION, POWDER, FOR SOLUTION INTRAMUSCULAR; INTRAVENOUS at 20:01

## 2023-05-23 RX ADMIN — POTASSIUM CHLORIDE 10 MEQ: 7.46 INJECTION, SOLUTION INTRAVENOUS at 18:04

## 2023-05-23 ASSESSMENT — PAIN SCALES - WONG BAKER
WONGBAKER_NUMERICALRESPONSE: 0

## 2023-05-23 ASSESSMENT — ENCOUNTER SYMPTOMS
ROS SKIN COMMENTS: ITCHING
ABDOMINAL PAIN: 0
NAUSEA: 0
SHORTNESS OF BREATH: 1

## 2023-05-24 ENCOUNTER — ANESTHESIA EVENT (OUTPATIENT)
Dept: ENDOSCOPY | Age: 78
End: 2023-05-24
Payer: MEDICARE

## 2023-05-24 ENCOUNTER — ANESTHESIA (OUTPATIENT)
Dept: ENDOSCOPY | Age: 78
End: 2023-05-24
Payer: MEDICARE

## 2023-05-24 ENCOUNTER — APPOINTMENT (OUTPATIENT)
Dept: CT IMAGING | Age: 78
DRG: 335 | End: 2023-05-24
Payer: MEDICARE

## 2023-05-24 LAB
ALBUMIN SERPL-MCNC: 3.2 G/DL (ref 3.4–5)
ANION GAP SERPL CALCULATED.3IONS-SCNC: 8 MMOL/L (ref 3–16)
BACTERIA UR CULT: ABNORMAL
BASOPHILS # BLD: 0 K/UL (ref 0–0.2)
BASOPHILS NFR BLD: 0.1 %
BASOPHILS NFR BLD: 0.2 %
BASOPHILS NFR BLD: 0.2 %
BUN SERPL-MCNC: 70 MG/DL (ref 7–20)
CALCIUM SERPL-MCNC: 8.1 MG/DL (ref 8.3–10.6)
CHLORIDE SERPL-SCNC: 91 MMOL/L (ref 99–110)
CO2 SERPL-SCNC: 40 MMOL/L (ref 21–32)
CREAT SERPL-MCNC: 1.7 MG/DL (ref 0.6–1.2)
DEPRECATED RDW RBC AUTO: 15.1 % (ref 12.4–15.4)
DEPRECATED RDW RBC AUTO: 15.4 % (ref 12.4–15.4)
DEPRECATED RDW RBC AUTO: 15.8 % (ref 12.4–15.4)
EOSINOPHIL # BLD: 0.6 K/UL (ref 0–0.6)
EOSINOPHIL # BLD: 0.7 K/UL (ref 0–0.6)
EOSINOPHIL # BLD: 1 K/UL (ref 0–0.6)
EOSINOPHIL NFR BLD: 3.3 %
EOSINOPHIL NFR BLD: 5.1 %
EOSINOPHIL NFR BLD: 6.4 %
GFR SERPLBLD CREATININE-BSD FMLA CKD-EPI: 31 ML/MIN/{1.73_M2}
GLUCOSE SERPL-MCNC: 121 MG/DL (ref 70–99)
HCT VFR BLD AUTO: 24.4 % (ref 36–48)
HCT VFR BLD AUTO: 24.4 % (ref 36–48)
HCT VFR BLD AUTO: 25.7 % (ref 36–48)
HGB BLD-MCNC: 8 G/DL (ref 12–16)
HGB BLD-MCNC: 8.1 G/DL (ref 12–16)
HGB BLD-MCNC: 8.4 G/DL (ref 12–16)
LYMPHOCYTES # BLD: 0.7 K/UL (ref 1–5.1)
LYMPHOCYTES # BLD: 0.7 K/UL (ref 1–5.1)
LYMPHOCYTES # BLD: 0.8 K/UL (ref 1–5.1)
LYMPHOCYTES NFR BLD: 3.7 %
LYMPHOCYTES NFR BLD: 4.9 %
LYMPHOCYTES NFR BLD: 5 %
MCH RBC QN AUTO: 29.4 PG (ref 26–34)
MCH RBC QN AUTO: 30 PG (ref 26–34)
MCH RBC QN AUTO: 30.2 PG (ref 26–34)
MCHC RBC AUTO-ENTMCNC: 32.7 G/DL (ref 31–36)
MCHC RBC AUTO-ENTMCNC: 32.9 G/DL (ref 31–36)
MCHC RBC AUTO-ENTMCNC: 33 G/DL (ref 31–36)
MCV RBC AUTO: 89.8 FL (ref 80–100)
MCV RBC AUTO: 90.9 FL (ref 80–100)
MCV RBC AUTO: 91.8 FL (ref 80–100)
MONOCYTES # BLD: 0.8 K/UL (ref 0–1.3)
MONOCYTES # BLD: 1.1 K/UL (ref 0–1.3)
MONOCYTES # BLD: 1.2 K/UL (ref 0–1.3)
MONOCYTES NFR BLD: 5.8 %
MONOCYTES NFR BLD: 6.2 %
MONOCYTES NFR BLD: 7.6 %
NEUTROPHILS # BLD: 12.2 K/UL (ref 1.7–7.7)
NEUTROPHILS # BLD: 12.5 K/UL (ref 1.7–7.7)
NEUTROPHILS # BLD: 16 K/UL (ref 1.7–7.7)
NEUTROPHILS NFR BLD: 81 %
NEUTROPHILS NFR BLD: 83.9 %
NEUTROPHILS NFR BLD: 86.6 %
ORGANISM: ABNORMAL
PHOSPHATE SERPL-MCNC: 3.1 MG/DL (ref 2.5–4.9)
PLATELET # BLD AUTO: 135 K/UL (ref 135–450)
PLATELET # BLD AUTO: 141 K/UL (ref 135–450)
PLATELET # BLD AUTO: 147 K/UL (ref 135–450)
PMV BLD AUTO: 10.4 FL (ref 5–10.5)
PMV BLD AUTO: 9.9 FL (ref 5–10.5)
PMV BLD AUTO: 9.9 FL (ref 5–10.5)
POTASSIUM SERPL-SCNC: 2.8 MMOL/L (ref 3.5–5.1)
POTASSIUM SERPL-SCNC: 3.6 MMOL/L (ref 3.5–5.1)
RBC # BLD AUTO: 2.66 M/UL (ref 4–5.2)
RBC # BLD AUTO: 2.68 M/UL (ref 4–5.2)
RBC # BLD AUTO: 2.86 M/UL (ref 4–5.2)
SODIUM SERPL-SCNC: 139 MMOL/L (ref 136–145)
WBC # BLD AUTO: 14.5 K/UL (ref 4–11)
WBC # BLD AUTO: 15.4 K/UL (ref 4–11)
WBC # BLD AUTO: 18.5 K/UL (ref 4–11)

## 2023-05-24 PROCEDURE — 2709999900 HC NON-CHARGEABLE SUPPLY: Performed by: INTERNAL MEDICINE

## 2023-05-24 PROCEDURE — 3609013900 HC ENTEROSCOPY: Performed by: INTERNAL MEDICINE

## 2023-05-24 PROCEDURE — 6370000000 HC RX 637 (ALT 250 FOR IP): Performed by: INTERNAL MEDICINE

## 2023-05-24 PROCEDURE — 74174 CTA ABD&PLVS W/CONTRAST: CPT

## 2023-05-24 PROCEDURE — 36430 TRANSFUSION BLD/BLD COMPNT: CPT

## 2023-05-24 PROCEDURE — 6360000004 HC RX CONTRAST MEDICATION: Performed by: INTERNAL MEDICINE

## 2023-05-24 PROCEDURE — 2500000003 HC RX 250 WO HCPCS: Performed by: NURSE ANESTHETIST, CERTIFIED REGISTERED

## 2023-05-24 PROCEDURE — 6360000002 HC RX W HCPCS: Performed by: INTERNAL MEDICINE

## 2023-05-24 PROCEDURE — 3700000001 HC ADD 15 MINUTES (ANESTHESIA): Performed by: INTERNAL MEDICINE

## 2023-05-24 PROCEDURE — 94761 N-INVAS EAR/PLS OXIMETRY MLT: CPT

## 2023-05-24 PROCEDURE — 0DJ08ZZ INSPECTION OF UPPER INTESTINAL TRACT, VIA NATURAL OR ARTIFICIAL OPENING ENDOSCOPIC: ICD-10-PCS | Performed by: INTERNAL MEDICINE

## 2023-05-24 PROCEDURE — 80069 RENAL FUNCTION PANEL: CPT

## 2023-05-24 PROCEDURE — 99232 SBSQ HOSP IP/OBS MODERATE 35: CPT | Performed by: NURSE PRACTITIONER

## 2023-05-24 PROCEDURE — 85025 COMPLETE CBC W/AUTO DIFF WBC: CPT

## 2023-05-24 PROCEDURE — 9990000010 HC NO CHARGE VISIT: Performed by: PHYSICAL THERAPIST

## 2023-05-24 PROCEDURE — 2580000003 HC RX 258: Performed by: INTERNAL MEDICINE

## 2023-05-24 PROCEDURE — 7100000001 HC PACU RECOVERY - ADDTL 15 MIN: Performed by: INTERNAL MEDICINE

## 2023-05-24 PROCEDURE — 6370000000 HC RX 637 (ALT 250 FOR IP): Performed by: STUDENT IN AN ORGANIZED HEALTH CARE EDUCATION/TRAINING PROGRAM

## 2023-05-24 PROCEDURE — C9113 INJ PANTOPRAZOLE SODIUM, VIA: HCPCS | Performed by: INTERNAL MEDICINE

## 2023-05-24 PROCEDURE — 6370000000 HC RX 637 (ALT 250 FOR IP): Performed by: NURSE PRACTITIONER

## 2023-05-24 PROCEDURE — 6360000002 HC RX W HCPCS: Performed by: NURSE PRACTITIONER

## 2023-05-24 PROCEDURE — 2580000003 HC RX 258: Performed by: NURSE PRACTITIONER

## 2023-05-24 PROCEDURE — 94640 AIRWAY INHALATION TREATMENT: CPT

## 2023-05-24 PROCEDURE — 84132 ASSAY OF SERUM POTASSIUM: CPT

## 2023-05-24 PROCEDURE — 2700000000 HC OXYGEN THERAPY PER DAY

## 2023-05-24 PROCEDURE — 3700000000 HC ANESTHESIA ATTENDED CARE: Performed by: INTERNAL MEDICINE

## 2023-05-24 PROCEDURE — 1200000000 HC SEMI PRIVATE

## 2023-05-24 PROCEDURE — 6360000002 HC RX W HCPCS: Performed by: NURSE ANESTHETIST, CERTIFIED REGISTERED

## 2023-05-24 PROCEDURE — 7100000000 HC PACU RECOVERY - FIRST 15 MIN: Performed by: INTERNAL MEDICINE

## 2023-05-24 RX ORDER — PHENYLEPHRINE HCL IN 0.9% NACL 1 MG/10 ML
SYRINGE (ML) INTRAVENOUS PRN
Status: DISCONTINUED | OUTPATIENT
Start: 2023-05-24 | End: 2023-05-24 | Stop reason: SDUPTHER

## 2023-05-24 RX ORDER — FUROSEMIDE 10 MG/ML
40 INJECTION INTRAMUSCULAR; INTRAVENOUS 2 TIMES DAILY
Status: DISCONTINUED | OUTPATIENT
Start: 2023-05-24 | End: 2023-05-28

## 2023-05-24 RX ORDER — PROPOFOL 10 MG/ML
INJECTION, EMULSION INTRAVENOUS PRN
Status: DISCONTINUED | OUTPATIENT
Start: 2023-05-24 | End: 2023-05-24 | Stop reason: SDUPTHER

## 2023-05-24 RX ORDER — SODIUM CHLORIDE 9 MG/ML
INJECTION, SOLUTION INTRAVENOUS PRN
Status: COMPLETED | OUTPATIENT
Start: 2023-05-24 | End: 2023-05-24

## 2023-05-24 RX ORDER — ONDANSETRON 2 MG/ML
4 INJECTION INTRAMUSCULAR; INTRAVENOUS
Status: DISCONTINUED | OUTPATIENT
Start: 2023-05-24 | End: 2023-05-24

## 2023-05-24 RX ORDER — POTASSIUM CHLORIDE 20 MEQ/1
40 TABLET, EXTENDED RELEASE ORAL ONCE
Status: COMPLETED | OUTPATIENT
Start: 2023-05-24 | End: 2023-05-24

## 2023-05-24 RX ORDER — LIDOCAINE HYDROCHLORIDE 20 MG/ML
INJECTION, SOLUTION EPIDURAL; INFILTRATION; INTRACAUDAL; PERINEURAL PRN
Status: DISCONTINUED | OUTPATIENT
Start: 2023-05-24 | End: 2023-05-24 | Stop reason: SDUPTHER

## 2023-05-24 RX ADMIN — MOMETASONE FUROATE AND FORMOTEROL FUMARATE DIHYDRATE 2 PUFF: 200; 5 AEROSOL RESPIRATORY (INHALATION) at 08:36

## 2023-05-24 RX ADMIN — ISOSORBIDE MONONITRATE 15 MG: 30 TABLET, EXTENDED RELEASE ORAL at 08:52

## 2023-05-24 RX ADMIN — MICONAZOLE NITRATE: 2 POWDER TOPICAL at 12:39

## 2023-05-24 RX ADMIN — SPIRONOLACTONE 25 MG: 25 TABLET ORAL at 08:52

## 2023-05-24 RX ADMIN — GLUCAGON HYDROCHLORIDE 0.5 MG: KIT at 16:17

## 2023-05-24 RX ADMIN — POTASSIUM CHLORIDE 10 MEQ: 7.46 INJECTION, SOLUTION INTRAVENOUS at 08:24

## 2023-05-24 RX ADMIN — LIDOCAINE HYDROCHLORIDE 80 MG: 20 INJECTION, SOLUTION EPIDURAL; INFILTRATION; INTRACAUDAL; PERINEURAL at 16:08

## 2023-05-24 RX ADMIN — FUROSEMIDE 40 MG: 10 INJECTION, SOLUTION INTRAMUSCULAR; INTRAVENOUS at 19:40

## 2023-05-24 RX ADMIN — Medication 100 MCG: at 16:17

## 2023-05-24 RX ADMIN — CEFTRIAXONE SODIUM 2000 MG: 2 INJECTION, POWDER, FOR SOLUTION INTRAMUSCULAR; INTRAVENOUS at 19:46

## 2023-05-24 RX ADMIN — IOPAMIDOL 75 ML: 755 INJECTION, SOLUTION INTRAVENOUS at 18:47

## 2023-05-24 RX ADMIN — MOMETASONE FUROATE AND FORMOTEROL FUMARATE DIHYDRATE 2 PUFF: 200; 5 AEROSOL RESPIRATORY (INHALATION) at 20:10

## 2023-05-24 RX ADMIN — SODIUM CHLORIDE: 9 INJECTION, SOLUTION INTRAVENOUS at 16:01

## 2023-05-24 RX ADMIN — HYDRALAZINE HYDROCHLORIDE 12.5 MG: 25 TABLET, FILM COATED ORAL at 08:52

## 2023-05-24 RX ADMIN — PROPOFOL 50 MG: 10 INJECTION, EMULSION INTRAVENOUS at 16:08

## 2023-05-24 RX ADMIN — POTASSIUM CHLORIDE 10 MEQ: 7.46 INJECTION, SOLUTION INTRAVENOUS at 12:37

## 2023-05-24 RX ADMIN — SODIUM CHLORIDE, PRESERVATIVE FREE 10 ML: 5 INJECTION INTRAVENOUS at 21:37

## 2023-05-24 RX ADMIN — POTASSIUM CHLORIDE 10 MEQ: 7.46 INJECTION, SOLUTION INTRAVENOUS at 19:44

## 2023-05-24 RX ADMIN — DOFETILIDE 250 MCG: 0.25 CAPSULE ORAL at 21:36

## 2023-05-24 RX ADMIN — RANOLAZINE 500 MG: 500 TABLET, FILM COATED, EXTENDED RELEASE ORAL at 08:52

## 2023-05-24 RX ADMIN — DILTIAZEM HYDROCHLORIDE 120 MG: 120 CAPSULE, COATED, EXTENDED RELEASE ORAL at 08:52

## 2023-05-24 RX ADMIN — Medication 100 MCG: at 16:14

## 2023-05-24 RX ADMIN — ROSUVASTATIN CALCIUM 20 MG: 20 TABLET, FILM COATED ORAL at 08:52

## 2023-05-24 RX ADMIN — RANOLAZINE 500 MG: 500 TABLET, FILM COATED, EXTENDED RELEASE ORAL at 21:36

## 2023-05-24 RX ADMIN — PANTOPRAZOLE SODIUM 8 MG/HR: 40 INJECTION, POWDER, FOR SOLUTION INTRAVENOUS at 03:01

## 2023-05-24 RX ADMIN — POTASSIUM CHLORIDE 10 MEQ: 7.46 INJECTION, SOLUTION INTRAVENOUS at 11:07

## 2023-05-24 RX ADMIN — PANTOPRAZOLE SODIUM 8 MG/HR: 40 INJECTION, POWDER, FOR SOLUTION INTRAVENOUS at 12:38

## 2023-05-24 RX ADMIN — MICONAZOLE NITRATE: 2 POWDER TOPICAL at 21:37

## 2023-05-24 RX ADMIN — DOFETILIDE 250 MCG: 0.25 CAPSULE ORAL at 08:51

## 2023-05-24 RX ADMIN — HYDRALAZINE HYDROCHLORIDE 12.5 MG: 25 TABLET, FILM COATED ORAL at 21:35

## 2023-05-24 RX ADMIN — PROPOFOL 75 MCG/KG/MIN: 10 INJECTION, EMULSION INTRAVENOUS at 16:11

## 2023-05-24 RX ADMIN — POTASSIUM CHLORIDE 40 MEQ: 1500 TABLET, EXTENDED RELEASE ORAL at 12:03

## 2023-05-24 RX ADMIN — TIOTROPIUM BROMIDE INHALATION SPRAY 2 PUFF: 3.12 SPRAY, METERED RESPIRATORY (INHALATION) at 08:36

## 2023-05-24 RX ADMIN — DIPHENHYDRAMINE HYDROCHLORIDE, ZINC ACETATE: 2; .1 CREAM TOPICAL at 19:46

## 2023-05-24 ASSESSMENT — COPD QUESTIONNAIRES: CAT_SEVERITY: SEVERE

## 2023-05-24 ASSESSMENT — PAIN SCALES - WONG BAKER: WONGBAKER_NUMERICALRESPONSE: 0

## 2023-05-24 ASSESSMENT — PAIN - FUNCTIONAL ASSESSMENT: PAIN_FUNCTIONAL_ASSESSMENT: 0-10

## 2023-05-24 ASSESSMENT — ENCOUNTER SYMPTOMS: SHORTNESS OF BREATH: 1

## 2023-05-24 ASSESSMENT — PAIN SCALES - GENERAL: PAINLEVEL_OUTOF10: 0

## 2023-05-24 NOTE — ANESTHESIA POSTPROCEDURE EVALUATION
Department of Anesthesiology  Postprocedure Note    Patient: Alicia Rios  MRN: [id number]  YOB: 1945  Date of evaluation: 5/24/2023      Procedure Summary     Date: 05/24/23 Room / Location: [address] [place] [address]    Anesthesia Start: 4295 Anesthesia Stop: 1627    Procedure: ENTEROSCOPY PUSH DIAGNOSTIC Diagnosis:       Anemia, unspecified type      (Anemia, unspecified type [D64.9])    Surgeons: Mikel Sims MD Responsible Provider: Waqar Shaw MD    Anesthesia Type: MAC ASA Status: 4          Anesthesia Type: No value filed.     Mode Phase I: Mode Score: 10    Mode Phase II:        Anesthesia Post Evaluation    Patient location during evaluation: PACU  Level of consciousness: awake and alert  Airway patency: patent  Nausea & Vomiting: no nausea and no vomiting  Complications: no  Cardiovascular status: blood pressure returned to baseline  Respiratory status: acceptable  Hydration status: euvolemic  Comments: Postoperative Anesthesia Note    Name:    Alicia Rios  MRN:      [id number]    Patient Vitals in the past 12 hrs:  05/24/23 1649, BP:(!) 126/52, Pulse:75, SpO2:98 %  05/24/23 1647, Temp:97.9 °F (36.6 °C), Temp src:Temporal  05/24/23 1644, Pulse:78, Resp:15, SpO2:97 %  05/24/23 1643, Pulse:74, Resp:21, SpO2:98 %  05/24/23 1640, BP:115/70, Pulse:70, Resp:17, SpO2:96 %  05/24/23 1635, BP:(!) 122/58, Pulse:69, Resp:27, SpO2:97 %  05/24/23 1632, BP:(!) 116/53, Temp:97.5 °F (36.4 °C), Temp src:Temporal, Pulse:74, Resp:17, SpO2:(!) 89 %  05/24/23 1630, BP:(!) 125/35, Pulse:72, Resp:13, SpO2:91 %  05/24/23 1421, BP:(!) 139/48, Temp:97.8 °F (36.6 °C), Temp src:Temporal, Pulse:67, Resp:20, SpO2:100 %  05/24/23 1120, BP:128/75, Temp:97.7 °F (36.5 °C), Temp src:Oral, Pulse:76, Resp:19, SpO2:98 %  05/24/23 0818, BP:(!) 145/64, Temp:97.9 °F (36.6 °C), Temp src:Oral, Pulse:77, Resp:23, SpO2:97 %     LABS:    CBC  Lab Results       Component                Value

## 2023-05-24 NOTE — ANESTHESIA PRE PROCEDURE
Anesthesia Evaluation  Patient summary reviewed and Nursing notes reviewed no history of anesthetic complications:   Airway: Mallampati: II  TM distance: >3 FB   Neck ROM: full  Mouth opening: > = 3 FB   Dental:          Pulmonary:   (+) COPD (4 L O2 baseline): severe,  shortness of breath:  sleep apnea:      (-) asthma                          ROS comment: COPD with chronic hypoxemic respiratory failure (on 2-3 L NC)  - Continue home inhalers   Cardiovascular:    (+) hypertension:, CAD:, dysrhythmias: atrial fibrillation, CHF: diastolic, LI:, hyperlipidemia    (-) valvular problems/murmurs, past MI, CABG/stent and  angina    NYHA Classification: III  ECG reviewed      Echocardiogram reviewed  Stress test reviewed             ROS comment: [facility] 2022:  Cath: 9/20/22 Mah[place]     Findings:  Left Main  Distal non obstructive 30% disease, evaluated by IVUS MLA > 7.5 mm2  LAD  Moderate diffuse disease  Circ  Proximal vessel 75-80% stenosis correlating with positive functional study   Mid vessel non-obstructive disease 50%  RCA   with L-R collaterals       Interventions/Vessels  PCI performed to the proximal Lcx with DESx2   Resolute Mckay 3.5x22 mm   Resolute Mckay 3. 5x8 mm   Excellent post angiographic result with BALWINDER 3 flow  Guides/Wires  XB 3.5 guide catheter   Terumo RunThrough   Balanced Heavy Weight  Devices  Opticross boston IVUS  Post % Stenosis  0  Closure Device  Perclose R CFA  Complications  None    TTE 2022:  Conclusions      Summary   Borderline conc. LVH with normal wall motion; EF   70%. Grade II diastolic   dysfunction with elevated LH filling pressures. The left atrium is severely dilated. The right ventricle is normal in size and function. Mild mitral, aortic and tricuspid regurgitation.      Neuro/Psych:      (-) seizures, TIA and CVA           GI/Hepatic/Renal:   (+) renal disease: CRI, morbid obesity     (-) GERD, PUD and liver disease      ROS comment: Symptomatic anemia secondary to

## 2023-05-25 ENCOUNTER — APPOINTMENT (OUTPATIENT)
Dept: GENERAL RADIOLOGY | Age: 78
DRG: 335 | End: 2023-05-25
Payer: MEDICARE

## 2023-05-25 ENCOUNTER — ANESTHESIA EVENT (OUTPATIENT)
Dept: OPERATING ROOM | Age: 78
End: 2023-05-25
Payer: MEDICARE

## 2023-05-25 PROBLEM — K63.89 SMALL BOWEL MASS: Status: ACTIVE | Noted: 2023-05-25

## 2023-05-25 PROBLEM — Z79.01 ON CONTINUOUS ORAL ANTICOAGULATION: Status: ACTIVE | Noted: 2023-05-25

## 2023-05-25 PROBLEM — Z99.81 DEPENDENCE ON CONTINUOUS SUPPLEMENTAL OXYGEN: Status: ACTIVE | Noted: 2023-05-25

## 2023-05-25 LAB
ALBUMIN SERPL-MCNC: 2.8 G/DL (ref 3.4–5)
ANION GAP SERPL CALCULATED.3IONS-SCNC: 9 MMOL/L (ref 3–16)
BASOPHILS # BLD: 0 K/UL (ref 0–0.2)
BASOPHILS # BLD: 0.1 K/UL (ref 0–0.2)
BASOPHILS NFR BLD: 0.1 %
BASOPHILS NFR BLD: 0.3 %
BASOPHILS NFR BLD: 0.3 %
BASOPHILS NFR BLD: 0.4 %
BLOOD BANK DISPENSE STATUS: NORMAL
BLOOD BANK PRODUCT CODE: NORMAL
BPU ID: NORMAL
BUN SERPL-MCNC: 62 MG/DL (ref 7–20)
CALCIUM SERPL-MCNC: 7.8 MG/DL (ref 8.3–10.6)
CHLORIDE SERPL-SCNC: 90 MMOL/L (ref 99–110)
CO2 SERPL-SCNC: 38 MMOL/L (ref 21–32)
CREAT SERPL-MCNC: 1.7 MG/DL (ref 0.6–1.2)
DEPRECATED RDW RBC AUTO: 15.3 % (ref 12.4–15.4)
DEPRECATED RDW RBC AUTO: 15.6 % (ref 12.4–15.4)
DEPRECATED RDW RBC AUTO: 15.6 % (ref 12.4–15.4)
DEPRECATED RDW RBC AUTO: 15.8 % (ref 12.4–15.4)
DESCRIPTION BLOOD BANK: NORMAL
EOSINOPHIL # BLD: 0.7 K/UL (ref 0–0.6)
EOSINOPHIL # BLD: 0.8 K/UL (ref 0–0.6)
EOSINOPHIL # BLD: 0.9 K/UL (ref 0–0.6)
EOSINOPHIL # BLD: 1.1 K/UL (ref 0–0.6)
EOSINOPHIL NFR BLD: 5.5 %
EOSINOPHIL NFR BLD: 5.8 %
EOSINOPHIL NFR BLD: 5.9 %
EOSINOPHIL NFR BLD: 7.5 %
GFR SERPLBLD CREATININE-BSD FMLA CKD-EPI: 31 ML/MIN/{1.73_M2}
GLUCOSE SERPL-MCNC: 98 MG/DL (ref 70–99)
HCT VFR BLD AUTO: 22.4 % (ref 36–48)
HCT VFR BLD AUTO: 24.5 % (ref 36–48)
HCT VFR BLD AUTO: 24.7 % (ref 36–48)
HCT VFR BLD AUTO: 24.8 % (ref 36–48)
HGB BLD-MCNC: 7.3 G/DL (ref 12–16)
HGB BLD-MCNC: 7.9 G/DL (ref 12–16)
HGB BLD-MCNC: 8.1 G/DL (ref 12–16)
HGB BLD-MCNC: 8.3 G/DL (ref 12–16)
LYMPHOCYTES # BLD: 0.6 K/UL (ref 1–5.1)
LYMPHOCYTES # BLD: 0.7 K/UL (ref 1–5.1)
LYMPHOCYTES # BLD: 0.8 K/UL (ref 1–5.1)
LYMPHOCYTES # BLD: 0.9 K/UL (ref 1–5.1)
LYMPHOCYTES NFR BLD: 4.8 %
LYMPHOCYTES NFR BLD: 5.1 %
LYMPHOCYTES NFR BLD: 5.7 %
LYMPHOCYTES NFR BLD: 6.4 %
MCH RBC QN AUTO: 29.3 PG (ref 26–34)
MCH RBC QN AUTO: 29.4 PG (ref 26–34)
MCH RBC QN AUTO: 29.4 PG (ref 26–34)
MCH RBC QN AUTO: 30.8 PG (ref 26–34)
MCHC RBC AUTO-ENTMCNC: 32.4 G/DL (ref 31–36)
MCHC RBC AUTO-ENTMCNC: 32.5 G/DL (ref 31–36)
MCHC RBC AUTO-ENTMCNC: 32.6 G/DL (ref 31–36)
MCHC RBC AUTO-ENTMCNC: 33.7 G/DL (ref 31–36)
MCV RBC AUTO: 90.2 FL (ref 80–100)
MCV RBC AUTO: 90.4 FL (ref 80–100)
MCV RBC AUTO: 90.6 FL (ref 80–100)
MCV RBC AUTO: 91.5 FL (ref 80–100)
MONOCYTES # BLD: 0.8 K/UL (ref 0–1.3)
MONOCYTES # BLD: 0.9 K/UL (ref 0–1.3)
MONOCYTES # BLD: 1 K/UL (ref 0–1.3)
MONOCYTES # BLD: 1.1 K/UL (ref 0–1.3)
MONOCYTES NFR BLD: 6.2 %
MONOCYTES NFR BLD: 6.5 %
MONOCYTES NFR BLD: 7.4 %
MONOCYTES NFR BLD: 7.5 %
NEUTROPHILS # BLD: 10.6 K/UL (ref 1.7–7.7)
NEUTROPHILS # BLD: 10.9 K/UL (ref 1.7–7.7)
NEUTROPHILS # BLD: 11.2 K/UL (ref 1.7–7.7)
NEUTROPHILS # BLD: 12 K/UL (ref 1.7–7.7)
NEUTROPHILS NFR BLD: 78.3 %
NEUTROPHILS NFR BLD: 80.7 %
NEUTROPHILS NFR BLD: 82.7 %
NEUTROPHILS NFR BLD: 82.9 %
PHOSPHATE SERPL-MCNC: 3.4 MG/DL (ref 2.5–4.9)
PLATELET # BLD AUTO: 133 K/UL (ref 135–450)
PLATELET # BLD AUTO: 140 K/UL (ref 135–450)
PLATELET # BLD AUTO: 141 K/UL (ref 135–450)
PLATELET # BLD AUTO: 150 K/UL (ref 135–450)
PMV BLD AUTO: 9.5 FL (ref 5–10.5)
PMV BLD AUTO: 9.6 FL (ref 5–10.5)
PMV BLD AUTO: 9.7 FL (ref 5–10.5)
PMV BLD AUTO: 9.8 FL (ref 5–10.5)
POTASSIUM SERPL-SCNC: 3.6 MMOL/L (ref 3.5–5.1)
RBC # BLD AUTO: 2.48 M/UL (ref 4–5.2)
RBC # BLD AUTO: 2.7 M/UL (ref 4–5.2)
RBC # BLD AUTO: 2.7 M/UL (ref 4–5.2)
RBC # BLD AUTO: 2.75 M/UL (ref 4–5.2)
SODIUM SERPL-SCNC: 137 MMOL/L (ref 136–145)
WBC # BLD AUTO: 13.1 K/UL (ref 4–11)
WBC # BLD AUTO: 13.2 K/UL (ref 4–11)
WBC # BLD AUTO: 14.2 K/UL (ref 4–11)
WBC # BLD AUTO: 14.5 K/UL (ref 4–11)

## 2023-05-25 PROCEDURE — 6370000000 HC RX 637 (ALT 250 FOR IP): Performed by: INTERNAL MEDICINE

## 2023-05-25 PROCEDURE — 97530 THERAPEUTIC ACTIVITIES: CPT | Performed by: PHYSICAL THERAPIST

## 2023-05-25 PROCEDURE — 6360000002 HC RX W HCPCS: Performed by: INTERNAL MEDICINE

## 2023-05-25 PROCEDURE — 2700000000 HC OXYGEN THERAPY PER DAY

## 2023-05-25 PROCEDURE — 99232 SBSQ HOSP IP/OBS MODERATE 35: CPT | Performed by: NURSE PRACTITIONER

## 2023-05-25 PROCEDURE — 1200000000 HC SEMI PRIVATE

## 2023-05-25 PROCEDURE — 99223 1ST HOSP IP/OBS HIGH 75: CPT | Performed by: SURGERY

## 2023-05-25 PROCEDURE — 74240 X-RAY XM UPR GI TRC 1CNTRST: CPT

## 2023-05-25 PROCEDURE — 97535 SELF CARE MNGMENT TRAINING: CPT

## 2023-05-25 PROCEDURE — 94640 AIRWAY INHALATION TREATMENT: CPT

## 2023-05-25 PROCEDURE — 36430 TRANSFUSION BLD/BLD COMPNT: CPT

## 2023-05-25 PROCEDURE — 2580000003 HC RX 258: Performed by: INTERNAL MEDICINE

## 2023-05-25 PROCEDURE — C9113 INJ PANTOPRAZOLE SODIUM, VIA: HCPCS | Performed by: INTERNAL MEDICINE

## 2023-05-25 PROCEDURE — 80069 RENAL FUNCTION PANEL: CPT

## 2023-05-25 PROCEDURE — 85025 COMPLETE CBC W/AUTO DIFF WBC: CPT

## 2023-05-25 PROCEDURE — 94760 N-INVAS EAR/PLS OXIMETRY 1: CPT

## 2023-05-25 PROCEDURE — 97530 THERAPEUTIC ACTIVITIES: CPT

## 2023-05-25 RX ORDER — SODIUM CHLORIDE 9 MG/ML
INJECTION, SOLUTION INTRAVENOUS PRN
Status: DISCONTINUED | OUTPATIENT
Start: 2023-05-25 | End: 2023-05-30 | Stop reason: HOSPADM

## 2023-05-25 RX ORDER — METRONIDAZOLE 500 MG/100ML
500 INJECTION, SOLUTION INTRAVENOUS
Status: DISCONTINUED | OUTPATIENT
Start: 2023-05-26 | End: 2023-05-25

## 2023-05-25 RX ADMIN — PANTOPRAZOLE SODIUM 8 MG/HR: 40 INJECTION, POWDER, FOR SOLUTION INTRAVENOUS at 00:54

## 2023-05-25 RX ADMIN — MOMETASONE FUROATE AND FORMOTEROL FUMARATE DIHYDRATE 2 PUFF: 200; 5 AEROSOL RESPIRATORY (INHALATION) at 20:07

## 2023-05-25 RX ADMIN — FUROSEMIDE 40 MG: 10 INJECTION, SOLUTION INTRAMUSCULAR; INTRAVENOUS at 18:34

## 2023-05-25 RX ADMIN — MOMETASONE FUROATE AND FORMOTEROL FUMARATE DIHYDRATE 2 PUFF: 200; 5 AEROSOL RESPIRATORY (INHALATION) at 08:03

## 2023-05-25 RX ADMIN — ISOSORBIDE MONONITRATE 15 MG: 30 TABLET, EXTENDED RELEASE ORAL at 13:33

## 2023-05-25 RX ADMIN — DIPHENHYDRAMINE HYDROCHLORIDE, ZINC ACETATE: 2; .1 CREAM TOPICAL at 06:49

## 2023-05-25 RX ADMIN — TIOTROPIUM BROMIDE INHALATION SPRAY 2 PUFF: 3.12 SPRAY, METERED RESPIRATORY (INHALATION) at 08:03

## 2023-05-25 RX ADMIN — RANOLAZINE 500 MG: 500 TABLET, FILM COATED, EXTENDED RELEASE ORAL at 20:19

## 2023-05-25 RX ADMIN — DOFETILIDE 250 MCG: 0.25 CAPSULE ORAL at 20:20

## 2023-05-25 RX ADMIN — ROSUVASTATIN CALCIUM 20 MG: 20 TABLET, FILM COATED ORAL at 13:33

## 2023-05-25 RX ADMIN — CEFTRIAXONE SODIUM 2000 MG: 2 INJECTION, POWDER, FOR SOLUTION INTRAMUSCULAR; INTRAVENOUS at 18:40

## 2023-05-25 RX ADMIN — PANTOPRAZOLE SODIUM 8 MG/HR: 40 INJECTION, POWDER, FOR SOLUTION INTRAVENOUS at 11:30

## 2023-05-25 RX ADMIN — PANTOPRAZOLE SODIUM 8 MG/HR: 40 INJECTION, POWDER, FOR SOLUTION INTRAVENOUS at 21:56

## 2023-05-25 RX ADMIN — SODIUM CHLORIDE, PRESERVATIVE FREE 10 ML: 5 INJECTION INTRAVENOUS at 13:35

## 2023-05-25 RX ADMIN — MICONAZOLE NITRATE: 2 POWDER TOPICAL at 20:21

## 2023-05-25 RX ADMIN — HYDRALAZINE HYDROCHLORIDE 12.5 MG: 25 TABLET, FILM COATED ORAL at 20:19

## 2023-05-25 RX ADMIN — SODIUM CHLORIDE, PRESERVATIVE FREE 10 ML: 5 INJECTION INTRAVENOUS at 20:21

## 2023-05-25 RX ADMIN — DILTIAZEM HYDROCHLORIDE 120 MG: 120 CAPSULE, COATED, EXTENDED RELEASE ORAL at 13:34

## 2023-05-25 RX ADMIN — MICONAZOLE NITRATE: 2 POWDER TOPICAL at 13:35

## 2023-05-25 RX ADMIN — FUROSEMIDE 40 MG: 10 INJECTION, SOLUTION INTRAMUSCULAR; INTRAVENOUS at 12:10

## 2023-05-25 ASSESSMENT — ENCOUNTER SYMPTOMS
ROS SKIN COMMENTS: ITCHING
NAUSEA: 0
ABDOMINAL PAIN: 0
SHORTNESS OF BREATH: 1

## 2023-05-26 ENCOUNTER — ANESTHESIA (OUTPATIENT)
Dept: OPERATING ROOM | Age: 78
End: 2023-05-26
Payer: MEDICARE

## 2023-05-26 LAB
ANION GAP SERPL CALCULATED.3IONS-SCNC: 5 MMOL/L (ref 3–16)
BASOPHILS # BLD: 0 K/UL (ref 0–0.2)
BASOPHILS # BLD: 0 K/UL (ref 0–0.2)
BASOPHILS NFR BLD: 0.3 %
BASOPHILS NFR BLD: 0.3 %
BUN SERPL-MCNC: 50 MG/DL (ref 7–20)
CALCIUM SERPL-MCNC: 8 MG/DL (ref 8.3–10.6)
CHLORIDE SERPL-SCNC: 96 MMOL/L (ref 99–110)
CO2 SERPL-SCNC: 40 MMOL/L (ref 21–32)
CREAT SERPL-MCNC: 1.6 MG/DL (ref 0.6–1.2)
DEPRECATED RDW RBC AUTO: 15.4 % (ref 12.4–15.4)
DEPRECATED RDW RBC AUTO: 15.6 % (ref 12.4–15.4)
EOSINOPHIL # BLD: 1.1 K/UL (ref 0–0.6)
EOSINOPHIL # BLD: 1.1 K/UL (ref 0–0.6)
EOSINOPHIL NFR BLD: 8.5 %
EOSINOPHIL NFR BLD: 8.8 %
GFR SERPLBLD CREATININE-BSD FMLA CKD-EPI: 33 ML/MIN/{1.73_M2}
GLUCOSE SERPL-MCNC: 107 MG/DL (ref 70–99)
HCT VFR BLD AUTO: 24 % (ref 36–48)
HCT VFR BLD AUTO: 24.6 % (ref 36–48)
HGB BLD-MCNC: 8 G/DL (ref 12–16)
HGB BLD-MCNC: 8.1 G/DL (ref 12–16)
LYMPHOCYTES # BLD: 0.7 K/UL (ref 1–5.1)
LYMPHOCYTES # BLD: 0.9 K/UL (ref 1–5.1)
LYMPHOCYTES NFR BLD: 6.3 %
LYMPHOCYTES NFR BLD: 6.9 %
MCH RBC QN AUTO: 29.6 PG (ref 26–34)
MCH RBC QN AUTO: 30.7 PG (ref 26–34)
MCHC RBC AUTO-ENTMCNC: 32.5 G/DL (ref 31–36)
MCHC RBC AUTO-ENTMCNC: 33.6 G/DL (ref 31–36)
MCV RBC AUTO: 91.1 FL (ref 80–100)
MCV RBC AUTO: 91.4 FL (ref 80–100)
MONOCYTES # BLD: 0.9 K/UL (ref 0–1.3)
MONOCYTES # BLD: 1 K/UL (ref 0–1.3)
MONOCYTES NFR BLD: 7.6 %
MONOCYTES NFR BLD: 8 %
NEUTROPHILS # BLD: 9.2 K/UL (ref 1.7–7.7)
NEUTROPHILS # BLD: 9.4 K/UL (ref 1.7–7.7)
NEUTROPHILS NFR BLD: 76.3 %
NEUTROPHILS NFR BLD: 77 %
PLATELET # BLD AUTO: 138 K/UL (ref 135–450)
PLATELET # BLD AUTO: 141 K/UL (ref 135–450)
PMV BLD AUTO: 9.8 FL (ref 5–10.5)
PMV BLD AUTO: 9.9 FL (ref 5–10.5)
POTASSIUM SERPL-SCNC: 3.3 MMOL/L (ref 3.5–5.1)
RBC # BLD AUTO: 2.63 M/UL (ref 4–5.2)
RBC # BLD AUTO: 2.7 M/UL (ref 4–5.2)
SODIUM SERPL-SCNC: 141 MMOL/L (ref 136–145)
WBC # BLD AUTO: 11.9 K/UL (ref 4–11)
WBC # BLD AUTO: 12.4 K/UL (ref 4–11)

## 2023-05-26 PROCEDURE — 6360000002 HC RX W HCPCS: Performed by: INTERNAL MEDICINE

## 2023-05-26 PROCEDURE — 2580000003 HC RX 258: Performed by: INTERNAL MEDICINE

## 2023-05-26 PROCEDURE — 2580000003 HC RX 258: Performed by: SURGERY

## 2023-05-26 PROCEDURE — 6370000000 HC RX 637 (ALT 250 FOR IP): Performed by: INTERNAL MEDICINE

## 2023-05-26 PROCEDURE — 1200000000 HC SEMI PRIVATE

## 2023-05-26 PROCEDURE — C9113 INJ PANTOPRAZOLE SODIUM, VIA: HCPCS | Performed by: SURGERY

## 2023-05-26 PROCEDURE — 2700000000 HC OXYGEN THERAPY PER DAY

## 2023-05-26 PROCEDURE — 2500000003 HC RX 250 WO HCPCS

## 2023-05-26 PROCEDURE — 58660 LAPAROSCOPY LYSIS: CPT | Performed by: SURGERY

## 2023-05-26 PROCEDURE — 7100000000 HC PACU RECOVERY - FIRST 15 MIN: Performed by: SURGERY

## 2023-05-26 PROCEDURE — 6360000002 HC RX W HCPCS: Performed by: ANESTHESIOLOGY

## 2023-05-26 PROCEDURE — 2580000003 HC RX 258: Performed by: ANESTHESIOLOGY

## 2023-05-26 PROCEDURE — 94640 AIRWAY INHALATION TREATMENT: CPT

## 2023-05-26 PROCEDURE — 6370000000 HC RX 637 (ALT 250 FOR IP): Performed by: SURGERY

## 2023-05-26 PROCEDURE — C9113 INJ PANTOPRAZOLE SODIUM, VIA: HCPCS | Performed by: INTERNAL MEDICINE

## 2023-05-26 PROCEDURE — 9990000010 HC NO CHARGE VISIT

## 2023-05-26 PROCEDURE — 2709999900 HC NON-CHARGEABLE SUPPLY: Performed by: SURGERY

## 2023-05-26 PROCEDURE — 3600000004 HC SURGERY LEVEL 4 BASE: Performed by: SURGERY

## 2023-05-26 PROCEDURE — 7100000001 HC PACU RECOVERY - ADDTL 15 MIN: Performed by: SURGERY

## 2023-05-26 PROCEDURE — 2500000003 HC RX 250 WO HCPCS: Performed by: SURGERY

## 2023-05-26 PROCEDURE — 6360000002 HC RX W HCPCS

## 2023-05-26 PROCEDURE — 0DJ07ZZ INSPECTION OF UPPER INTESTINAL TRACT, VIA NATURAL OR ARTIFICIAL OPENING: ICD-10-PCS | Performed by: INTERNAL MEDICINE

## 2023-05-26 PROCEDURE — 6360000002 HC RX W HCPCS: Performed by: SURGERY

## 2023-05-26 PROCEDURE — 99233 SBSQ HOSP IP/OBS HIGH 50: CPT | Performed by: INTERNAL MEDICINE

## 2023-05-26 PROCEDURE — 80048 BASIC METABOLIC PNL TOTAL CA: CPT

## 2023-05-26 PROCEDURE — 2720000010 HC SURG SUPPLY STERILE: Performed by: SURGERY

## 2023-05-26 PROCEDURE — 44376 SMALL BOWEL ENDOSCOPY: CPT | Performed by: SURGERY

## 2023-05-26 PROCEDURE — A4217 STERILE WATER/SALINE, 500 ML: HCPCS | Performed by: SURGERY

## 2023-05-26 PROCEDURE — 3600000014 HC SURGERY LEVEL 4 ADDTL 15MIN: Performed by: SURGERY

## 2023-05-26 PROCEDURE — 85025 COMPLETE CBC W/AUTO DIFF WBC: CPT

## 2023-05-26 PROCEDURE — 3700000001 HC ADD 15 MINUTES (ANESTHESIA): Performed by: SURGERY

## 2023-05-26 PROCEDURE — 94760 N-INVAS EAR/PLS OXIMETRY 1: CPT

## 2023-05-26 PROCEDURE — 0DNU0ZZ RELEASE OMENTUM, OPEN APPROACH: ICD-10-PCS | Performed by: INTERNAL MEDICINE

## 2023-05-26 PROCEDURE — 3700000000 HC ANESTHESIA ATTENDED CARE: Performed by: SURGERY

## 2023-05-26 PROCEDURE — 0W3P8ZZ CONTROL BLEEDING IN GASTROINTESTINAL TRACT, VIA NATURAL OR ARTIFICIAL OPENING ENDOSCOPIC: ICD-10-PCS | Performed by: INTERNAL MEDICINE

## 2023-05-26 PROCEDURE — 9990000010 HC NO CHARGE VISIT: Performed by: PHYSICAL THERAPIST

## 2023-05-26 RX ORDER — SODIUM CHLORIDE 0.9 % (FLUSH) 0.9 %
5-40 SYRINGE (ML) INJECTION PRN
Status: DISCONTINUED | OUTPATIENT
Start: 2023-05-26 | End: 2023-05-26 | Stop reason: HOSPADM

## 2023-05-26 RX ORDER — SODIUM CHLORIDE 9 MG/ML
INJECTION, SOLUTION INTRAVENOUS PRN
Status: DISCONTINUED | OUTPATIENT
Start: 2023-05-26 | End: 2023-05-26

## 2023-05-26 RX ORDER — DEXAMETHASONE SODIUM PHOSPHATE 4 MG/ML
INJECTION, SOLUTION INTRA-ARTICULAR; INTRALESIONAL; INTRAMUSCULAR; INTRAVENOUS; SOFT TISSUE PRN
Status: DISCONTINUED | OUTPATIENT
Start: 2023-05-26 | End: 2023-05-26 | Stop reason: SDUPTHER

## 2023-05-26 RX ORDER — SODIUM CHLORIDE 0.9 % (FLUSH) 0.9 %
5-40 SYRINGE (ML) INJECTION EVERY 12 HOURS SCHEDULED
Status: DISCONTINUED | OUTPATIENT
Start: 2023-05-26 | End: 2023-05-26

## 2023-05-26 RX ORDER — ROCURONIUM BROMIDE 10 MG/ML
INJECTION, SOLUTION INTRAVENOUS PRN
Status: DISCONTINUED | OUTPATIENT
Start: 2023-05-26 | End: 2023-05-26 | Stop reason: SDUPTHER

## 2023-05-26 RX ORDER — SUCCINYLCHOLINE/SOD CL,ISO/PF 200MG/10ML
SYRINGE (ML) INTRAVENOUS PRN
Status: DISCONTINUED | OUTPATIENT
Start: 2023-05-26 | End: 2023-05-26 | Stop reason: SDUPTHER

## 2023-05-26 RX ORDER — TRAMADOL HYDROCHLORIDE 50 MG/1
50 TABLET ORAL EVERY 6 HOURS PRN
Status: DISCONTINUED | OUTPATIENT
Start: 2023-05-26 | End: 2023-05-30 | Stop reason: HOSPADM

## 2023-05-26 RX ORDER — SODIUM CHLORIDE 9 MG/ML
INJECTION, SOLUTION INTRAVENOUS PRN
Status: DISCONTINUED | OUTPATIENT
Start: 2023-05-26 | End: 2023-05-26 | Stop reason: HOSPADM

## 2023-05-26 RX ORDER — PROPOFOL 10 MG/ML
INJECTION, EMULSION INTRAVENOUS PRN
Status: DISCONTINUED | OUTPATIENT
Start: 2023-05-26 | End: 2023-05-26 | Stop reason: SDUPTHER

## 2023-05-26 RX ORDER — LIDOCAINE HYDROCHLORIDE 20 MG/ML
INJECTION, SOLUTION EPIDURAL; INFILTRATION; INTRACAUDAL; PERINEURAL PRN
Status: DISCONTINUED | OUTPATIENT
Start: 2023-05-26 | End: 2023-05-26 | Stop reason: SDUPTHER

## 2023-05-26 RX ORDER — FENTANYL CITRATE 50 UG/ML
INJECTION, SOLUTION INTRAMUSCULAR; INTRAVENOUS PRN
Status: DISCONTINUED | OUTPATIENT
Start: 2023-05-26 | End: 2023-05-26 | Stop reason: SDUPTHER

## 2023-05-26 RX ORDER — ONDANSETRON 2 MG/ML
INJECTION INTRAMUSCULAR; INTRAVENOUS PRN
Status: DISCONTINUED | OUTPATIENT
Start: 2023-05-26 | End: 2023-05-26 | Stop reason: SDUPTHER

## 2023-05-26 RX ORDER — ONDANSETRON 2 MG/ML
4 INJECTION INTRAMUSCULAR; INTRAVENOUS
Status: COMPLETED | OUTPATIENT
Start: 2023-05-26 | End: 2023-05-26

## 2023-05-26 RX ORDER — MAGNESIUM HYDROXIDE 1200 MG/15ML
LIQUID ORAL CONTINUOUS PRN
Status: COMPLETED | OUTPATIENT
Start: 2023-05-26 | End: 2023-05-26

## 2023-05-26 RX ORDER — TRAMADOL HYDROCHLORIDE 50 MG/1
100 TABLET ORAL EVERY 6 HOURS PRN
Status: DISCONTINUED | OUTPATIENT
Start: 2023-05-26 | End: 2023-05-30 | Stop reason: HOSPADM

## 2023-05-26 RX ORDER — METRONIDAZOLE 500 MG/100ML
500 INJECTION, SOLUTION INTRAVENOUS
Status: COMPLETED | OUTPATIENT
Start: 2023-05-26 | End: 2023-05-26

## 2023-05-26 RX ORDER — BUPIVACAINE HYDROCHLORIDE 5 MG/ML
INJECTION, SOLUTION EPIDURAL; INTRACAUDAL
Status: COMPLETED | OUTPATIENT
Start: 2023-05-26 | End: 2023-05-26

## 2023-05-26 RX ORDER — EPHEDRINE SULFATE/0.9% NACL/PF 50 MG/5 ML
SYRINGE (ML) INTRAVENOUS PRN
Status: DISCONTINUED | OUTPATIENT
Start: 2023-05-26 | End: 2023-05-26 | Stop reason: SDUPTHER

## 2023-05-26 RX ORDER — ACETAMINOPHEN 500 MG
1000 TABLET ORAL EVERY 8 HOURS SCHEDULED
Status: DISCONTINUED | OUTPATIENT
Start: 2023-05-26 | End: 2023-05-30 | Stop reason: HOSPADM

## 2023-05-26 RX ORDER — OXYCODONE HYDROCHLORIDE 10 MG/1
10 TABLET ORAL PRN
Status: DISCONTINUED | OUTPATIENT
Start: 2023-05-26 | End: 2023-05-26

## 2023-05-26 RX ORDER — SODIUM CHLORIDE 0.9 % (FLUSH) 0.9 %
5-40 SYRINGE (ML) INJECTION PRN
Status: DISCONTINUED | OUTPATIENT
Start: 2023-05-26 | End: 2023-05-26

## 2023-05-26 RX ORDER — PHENYLEPHRINE HCL IN 0.9% NACL 1 MG/10 ML
SYRINGE (ML) INTRAVENOUS PRN
Status: DISCONTINUED | OUTPATIENT
Start: 2023-05-26 | End: 2023-05-26 | Stop reason: SDUPTHER

## 2023-05-26 RX ORDER — SODIUM CHLORIDE 0.9 % (FLUSH) 0.9 %
5-40 SYRINGE (ML) INJECTION EVERY 12 HOURS SCHEDULED
Status: DISCONTINUED | OUTPATIENT
Start: 2023-05-26 | End: 2023-05-26 | Stop reason: HOSPADM

## 2023-05-26 RX ORDER — OXYCODONE HYDROCHLORIDE 5 MG/1
5 TABLET ORAL PRN
Status: DISCONTINUED | OUTPATIENT
Start: 2023-05-26 | End: 2023-05-26

## 2023-05-26 RX ADMIN — Medication 50 MCG: at 12:27

## 2023-05-26 RX ADMIN — Medication 10 MG: at 12:02

## 2023-05-26 RX ADMIN — SODIUM CHLORIDE, PRESERVATIVE FREE 10 ML: 5 INJECTION INTRAVENOUS at 21:20

## 2023-05-26 RX ADMIN — Medication 100 MCG: at 12:56

## 2023-05-26 RX ADMIN — ISOSORBIDE MONONITRATE 15 MG: 30 TABLET, EXTENDED RELEASE ORAL at 08:52

## 2023-05-26 RX ADMIN — SODIUM CHLORIDE: 9 INJECTION, SOLUTION INTRAVENOUS at 10:56

## 2023-05-26 RX ADMIN — Medication 50 MCG: at 12:43

## 2023-05-26 RX ADMIN — SODIUM CHLORIDE, PRESERVATIVE FREE 10 ML: 5 INJECTION INTRAVENOUS at 08:53

## 2023-05-26 RX ADMIN — ACETAMINOPHEN 1000 MG: 500 TABLET ORAL at 21:06

## 2023-05-26 RX ADMIN — FENTANYL CITRATE 25 MCG: 50 INJECTION INTRAMUSCULAR; INTRAVENOUS at 11:32

## 2023-05-26 RX ADMIN — RANOLAZINE 500 MG: 500 TABLET, FILM COATED, EXTENDED RELEASE ORAL at 21:07

## 2023-05-26 RX ADMIN — Medication 120 MG: at 11:32

## 2023-05-26 RX ADMIN — Medication 100 MCG: at 13:11

## 2023-05-26 RX ADMIN — PANTOPRAZOLE SODIUM 8 MG/HR: 40 INJECTION, POWDER, FOR SOLUTION INTRAVENOUS at 21:20

## 2023-05-26 RX ADMIN — Medication 10 MG: at 12:22

## 2023-05-26 RX ADMIN — FUROSEMIDE 40 MG: 10 INJECTION, SOLUTION INTRAMUSCULAR; INTRAVENOUS at 18:18

## 2023-05-26 RX ADMIN — SPIRONOLACTONE 25 MG: 25 TABLET ORAL at 08:52

## 2023-05-26 RX ADMIN — SUGAMMADEX 100 MG: 100 INJECTION, SOLUTION INTRAVENOUS at 13:21

## 2023-05-26 RX ADMIN — Medication 5 MG: at 12:01

## 2023-05-26 RX ADMIN — DEXAMETHASONE SODIUM PHOSPHATE 8 MG: 4 INJECTION, SOLUTION INTRAMUSCULAR; INTRAVENOUS at 11:45

## 2023-05-26 RX ADMIN — ROCURONIUM BROMIDE 5 MG: 10 INJECTION INTRAVENOUS at 11:32

## 2023-05-26 RX ADMIN — DOFETILIDE 250 MCG: 0.25 CAPSULE ORAL at 08:52

## 2023-05-26 RX ADMIN — POTASSIUM CHLORIDE 40 MEQ: 1500 TABLET, EXTENDED RELEASE ORAL at 08:52

## 2023-05-26 RX ADMIN — HYDRALAZINE HYDROCHLORIDE 12.5 MG: 25 TABLET, FILM COATED ORAL at 21:07

## 2023-05-26 RX ADMIN — Medication 5 MG: at 11:59

## 2023-05-26 RX ADMIN — ROSUVASTATIN CALCIUM 20 MG: 20 TABLET, FILM COATED ORAL at 08:51

## 2023-05-26 RX ADMIN — FUROSEMIDE 40 MG: 10 INJECTION, SOLUTION INTRAMUSCULAR; INTRAVENOUS at 08:52

## 2023-05-26 RX ADMIN — DOFETILIDE 250 MCG: 0.25 CAPSULE ORAL at 21:07

## 2023-05-26 RX ADMIN — DILTIAZEM HYDROCHLORIDE 120 MG: 120 CAPSULE, COATED, EXTENDED RELEASE ORAL at 08:52

## 2023-05-26 RX ADMIN — ROCURONIUM BROMIDE 45 MG: 10 INJECTION INTRAVENOUS at 11:36

## 2023-05-26 RX ADMIN — ONDANSETRON 4 MG: 2 INJECTION INTRAMUSCULAR; INTRAVENOUS at 14:03

## 2023-05-26 RX ADMIN — CEFTRIAXONE SODIUM 2000 MG: 2 INJECTION, POWDER, FOR SOLUTION INTRAMUSCULAR; INTRAVENOUS at 18:24

## 2023-05-26 RX ADMIN — HYDRALAZINE HYDROCHLORIDE 12.5 MG: 25 TABLET, FILM COATED ORAL at 08:52

## 2023-05-26 RX ADMIN — METRONIDAZOLE 500 MG: 500 INJECTION, SOLUTION INTRAVENOUS at 11:40

## 2023-05-26 RX ADMIN — MOMETASONE FUROATE AND FORMOTEROL FUMARATE DIHYDRATE 2 PUFF: 200; 5 AEROSOL RESPIRATORY (INHALATION) at 19:28

## 2023-05-26 RX ADMIN — RANOLAZINE 500 MG: 500 TABLET, FILM COATED, EXTENDED RELEASE ORAL at 08:52

## 2023-05-26 RX ADMIN — TIOTROPIUM BROMIDE INHALATION SPRAY 2 PUFF: 3.12 SPRAY, METERED RESPIRATORY (INHALATION) at 07:38

## 2023-05-26 RX ADMIN — ONDANSETRON 4 MG: 2 INJECTION INTRAMUSCULAR; INTRAVENOUS at 13:24

## 2023-05-26 RX ADMIN — Medication 100 MCG: at 13:02

## 2023-05-26 RX ADMIN — MICONAZOLE NITRATE: 2 POWDER TOPICAL at 21:21

## 2023-05-26 RX ADMIN — SUGAMMADEX 200 MG: 100 INJECTION, SOLUTION INTRAVENOUS at 13:15

## 2023-05-26 RX ADMIN — PROPOFOL 120 MG: 10 INJECTION, EMULSION INTRAVENOUS at 11:32

## 2023-05-26 RX ADMIN — Medication 10 MG: at 12:07

## 2023-05-26 RX ADMIN — PANTOPRAZOLE SODIUM 8 MG/HR: 40 INJECTION, POWDER, FOR SOLUTION INTRAVENOUS at 09:07

## 2023-05-26 RX ADMIN — LIDOCAINE HYDROCHLORIDE 80 MG: 20 INJECTION, SOLUTION EPIDURAL; INFILTRATION; INTRACAUDAL; PERINEURAL at 11:32

## 2023-05-26 RX ADMIN — MOMETASONE FUROATE AND FORMOTEROL FUMARATE DIHYDRATE 2 PUFF: 200; 5 AEROSOL RESPIRATORY (INHALATION) at 07:40

## 2023-05-26 RX ADMIN — Medication 10 MG: at 12:23

## 2023-05-26 ASSESSMENT — PAIN SCALES - GENERAL
PAINLEVEL_OUTOF10: 0

## 2023-05-26 ASSESSMENT — PAIN - FUNCTIONAL ASSESSMENT: PAIN_FUNCTIONAL_ASSESSMENT: 0-10

## 2023-05-26 ASSESSMENT — COPD QUESTIONNAIRES: CAT_SEVERITY: SEVERE

## 2023-05-26 ASSESSMENT — ENCOUNTER SYMPTOMS: SHORTNESS OF BREATH: 1

## 2023-05-26 NOTE — ANESTHESIA POSTPROCEDURE EVALUATION
Department of Anesthesiology  Postprocedure Note    Patient: Jacoby Ballard  MRN: [id number]  YOB: 1945  Date of evaluation: 5/26/2023      Procedure Summary     Date: 05/26/23 Room / Location: [address]    Anesthesia Start: 1125 Anesthesia Stop: 0011    Procedure: LAPAROSCOPIC ASSISTED LYSIS OF ADHESIONS AND PUSH ENTEROSCOPY (Abdomen) Diagnosis:       Small bowel mass      (SMALL BOWEL MASS)    Surgeons: Pravin Méndez MD Responsible Provider: Tj Long MD    Anesthesia Type: general ASA Status: 4          Anesthesia Type: No value filed.     Mode Phase I: Mode Score: 8    Mode Phase II:        Anesthesia Post Evaluation    Patient location during evaluation: PACU  Patient participation: complete - patient participated  Level of consciousness: awake and alert  Airway patency: patent  Nausea & Vomiting: no nausea and no vomiting  Complications: no  Cardiovascular status: hemodynamically stable  Respiratory status: acceptable  Hydration status: stable

## 2023-05-26 NOTE — ANESTHESIA PRE PROCEDURE
DESx2   Resolute Bigelow 3.5x22 mm   Resolute Bigelow 3. 5x8 mm   Excellent post angiographic result with BALWINDER 3 flow  Guides/Wires  XB 3.5 guide catheter   Terumo RunThrough   Balanced Heavy Weight  Devices  Opticross boston IVUS  Post % Stenosis  0  Closure Device  Perclose R CFA  Complications  None    TTE 2022:  Conclusions      Summary   Borderline conc. LVH with normal wall motion; EF   70%. Grade II diastolic   dysfunction with elevated LH filling pressures. The left atrium is severely dilated. The right ventricle is normal in size and function. Mild mitral, aortic and tricuspid regurgitation. Neuro/Psych:      (-) seizures, TIA and CVA           GI/Hepatic/Renal:   (+) renal disease: CRI, morbid obesity     (-) GERD, PUD and liver disease      ROS comment: Symptomatic anemia secondary to melena  - Endorsed having worsening SOB and LI for the past few weeks. Denied any NSAIDs, however, endorsed melenotic stools which she attributed to iron supplements. Currently on Xarelto for PAF. Hg 6.1 on outpatient labs for Cardiology. - Repeat Hg 6.2 in the ED. Transfused x1 u pRBC in the ED.  - Started on Protonix gtt. GI consulted, CLD. .   Endo/Other:    (+) blood dyscrasia (Hgb 6.8, given 1 u PRBC today): anticoagulation therapy and anemia:., .    (-) diabetes mellitus               Abdominal:             Vascular:   + PVD, aortic or cerebral, . Other Findings:             Anesthesia Plan      general     ASA 4     (I discussed with the patient the risks and benefits of PIV, general anesthesia, IV Narcotics, PACU. All questions were answered the patient agrees with the plan.)  Induction: intravenous. MIPS: Postoperative opioids intended, Prophylactic antiemetics administered and Postoperative trial extubation. Anesthetic plan and risks discussed with patient. Plan discussed with CRNA.                 This pre-anesthesia assessment may be used as a history and physical.    DOS STAFF ADDENDUM:    Pt

## 2023-05-27 LAB
ABO + RH BLD: NORMAL
ANION GAP SERPL CALCULATED.3IONS-SCNC: 6 MMOL/L (ref 3–16)
BASOPHILS # BLD: 0 K/UL (ref 0–0.2)
BASOPHILS NFR BLD: 0.1 %
BLD GP AB SCN SERPL QL: NORMAL
BLOOD BANK DISPENSE STATUS: NORMAL
BLOOD BANK PRODUCT CODE: NORMAL
BPU ID: NORMAL
BUN SERPL-MCNC: 43 MG/DL (ref 7–20)
CALCIUM SERPL-MCNC: 7.8 MG/DL (ref 8.3–10.6)
CHLORIDE SERPL-SCNC: 94 MMOL/L (ref 99–110)
CO2 SERPL-SCNC: 36 MMOL/L (ref 21–32)
CREAT SERPL-MCNC: 1.7 MG/DL (ref 0.6–1.2)
DEPRECATED RDW RBC AUTO: 15.6 % (ref 12.4–15.4)
DESCRIPTION BLOOD BANK: NORMAL
EOSINOPHIL # BLD: 0 K/UL (ref 0–0.6)
EOSINOPHIL NFR BLD: 0 %
GFR SERPLBLD CREATININE-BSD FMLA CKD-EPI: 31 ML/MIN/{1.73_M2}
GLUCOSE SERPL-MCNC: 119 MG/DL (ref 70–99)
HCT VFR BLD AUTO: 23.3 % (ref 36–48)
HGB BLD-MCNC: 7.7 G/DL (ref 12–16)
LYMPHOCYTES # BLD: 0.4 K/UL (ref 1–5.1)
LYMPHOCYTES NFR BLD: 2.4 %
MAGNESIUM SERPL-MCNC: 1.5 MG/DL (ref 1.8–2.4)
MCH RBC QN AUTO: 30.6 PG (ref 26–34)
MCHC RBC AUTO-ENTMCNC: 33.3 G/DL (ref 31–36)
MCV RBC AUTO: 92 FL (ref 80–100)
MONOCYTES # BLD: 0.6 K/UL (ref 0–1.3)
MONOCYTES NFR BLD: 4 %
NEUTROPHILS # BLD: 14.9 K/UL (ref 1.7–7.7)
NEUTROPHILS NFR BLD: 93.5 %
PHOSPHATE SERPL-MCNC: 3.4 MG/DL (ref 2.5–4.9)
PLATELET # BLD AUTO: 169 K/UL (ref 135–450)
PMV BLD AUTO: 9.5 FL (ref 5–10.5)
POTASSIUM SERPL-SCNC: 3.7 MMOL/L (ref 3.5–5.1)
RBC # BLD AUTO: 2.53 M/UL (ref 4–5.2)
SODIUM SERPL-SCNC: 136 MMOL/L (ref 136–145)
WBC # BLD AUTO: 16 K/UL (ref 4–11)

## 2023-05-27 PROCEDURE — 83735 ASSAY OF MAGNESIUM: CPT

## 2023-05-27 PROCEDURE — 86850 RBC ANTIBODY SCREEN: CPT

## 2023-05-27 PROCEDURE — C9113 INJ PANTOPRAZOLE SODIUM, VIA: HCPCS | Performed by: SURGERY

## 2023-05-27 PROCEDURE — 6360000002 HC RX W HCPCS: Performed by: SURGERY

## 2023-05-27 PROCEDURE — 6370000000 HC RX 637 (ALT 250 FOR IP): Performed by: SURGERY

## 2023-05-27 PROCEDURE — P9016 RBC LEUKOCYTES REDUCED: HCPCS

## 2023-05-27 PROCEDURE — 1200000000 HC SEMI PRIVATE

## 2023-05-27 PROCEDURE — 2700000000 HC OXYGEN THERAPY PER DAY

## 2023-05-27 PROCEDURE — 36415 COLL VENOUS BLD VENIPUNCTURE: CPT

## 2023-05-27 PROCEDURE — 2580000003 HC RX 258: Performed by: SURGERY

## 2023-05-27 PROCEDURE — 85025 COMPLETE CBC W/AUTO DIFF WBC: CPT

## 2023-05-27 PROCEDURE — 86900 BLOOD TYPING SEROLOGIC ABO: CPT

## 2023-05-27 PROCEDURE — 80048 BASIC METABOLIC PNL TOTAL CA: CPT

## 2023-05-27 PROCEDURE — 97530 THERAPEUTIC ACTIVITIES: CPT

## 2023-05-27 PROCEDURE — 99024 POSTOP FOLLOW-UP VISIT: CPT | Performed by: SURGERY

## 2023-05-27 PROCEDURE — 97530 THERAPEUTIC ACTIVITIES: CPT | Performed by: PHYSICAL THERAPIST

## 2023-05-27 PROCEDURE — 6360000002 HC RX W HCPCS: Performed by: NURSE PRACTITIONER

## 2023-05-27 PROCEDURE — 94760 N-INVAS EAR/PLS OXIMETRY 1: CPT

## 2023-05-27 PROCEDURE — 36430 TRANSFUSION BLD/BLD COMPNT: CPT

## 2023-05-27 PROCEDURE — 86923 COMPATIBILITY TEST ELECTRIC: CPT

## 2023-05-27 PROCEDURE — 94150 VITAL CAPACITY TEST: CPT

## 2023-05-27 PROCEDURE — 6370000000 HC RX 637 (ALT 250 FOR IP): Performed by: NURSE PRACTITIONER

## 2023-05-27 PROCEDURE — 84100 ASSAY OF PHOSPHORUS: CPT

## 2023-05-27 PROCEDURE — 86901 BLOOD TYPING SEROLOGIC RH(D): CPT

## 2023-05-27 PROCEDURE — 94640 AIRWAY INHALATION TREATMENT: CPT

## 2023-05-27 PROCEDURE — 99232 SBSQ HOSP IP/OBS MODERATE 35: CPT | Performed by: NURSE PRACTITIONER

## 2023-05-27 RX ORDER — PANTOPRAZOLE SODIUM 40 MG/1
40 TABLET, DELAYED RELEASE ORAL
Status: DISCONTINUED | OUTPATIENT
Start: 2023-05-28 | End: 2023-05-30 | Stop reason: HOSPADM

## 2023-05-27 RX ORDER — CARVEDILOL 6.25 MG/1
6.25 TABLET ORAL 2 TIMES DAILY WITH MEALS
Status: DISCONTINUED | OUTPATIENT
Start: 2023-05-27 | End: 2023-05-30 | Stop reason: HOSPADM

## 2023-05-27 RX ORDER — HYDROXYZINE PAMOATE 25 MG/1
25 CAPSULE ORAL ONCE
Status: COMPLETED | OUTPATIENT
Start: 2023-05-27 | End: 2023-05-27

## 2023-05-27 RX ORDER — HYDRALAZINE HYDROCHLORIDE 25 MG/1
25 TABLET, FILM COATED ORAL EVERY 12 HOURS SCHEDULED
Status: DISCONTINUED | OUTPATIENT
Start: 2023-05-27 | End: 2023-05-30 | Stop reason: HOSPADM

## 2023-05-27 RX ORDER — MAGNESIUM SULFATE IN WATER 40 MG/ML
2000 INJECTION, SOLUTION INTRAVENOUS ONCE
Status: COMPLETED | OUTPATIENT
Start: 2023-05-27 | End: 2023-05-27

## 2023-05-27 RX ORDER — SODIUM CHLORIDE 9 MG/ML
INJECTION, SOLUTION INTRAVENOUS PRN
Status: DISCONTINUED | OUTPATIENT
Start: 2023-05-27 | End: 2023-05-30 | Stop reason: HOSPADM

## 2023-05-27 RX ADMIN — ACETAMINOPHEN 1000 MG: 500 TABLET ORAL at 15:04

## 2023-05-27 RX ADMIN — FUROSEMIDE 40 MG: 10 INJECTION, SOLUTION INTRAMUSCULAR; INTRAVENOUS at 17:52

## 2023-05-27 RX ADMIN — CARVEDILOL 6.25 MG: 6.25 TABLET, FILM COATED ORAL at 15:04

## 2023-05-27 RX ADMIN — SPIRONOLACTONE 25 MG: 25 TABLET ORAL at 08:14

## 2023-05-27 RX ADMIN — SODIUM CHLORIDE, PRESERVATIVE FREE 10 ML: 5 INJECTION INTRAVENOUS at 08:14

## 2023-05-27 RX ADMIN — CEFTRIAXONE SODIUM 2000 MG: 2 INJECTION, POWDER, FOR SOLUTION INTRAMUSCULAR; INTRAVENOUS at 18:38

## 2023-05-27 RX ADMIN — ROSUVASTATIN CALCIUM 20 MG: 20 TABLET, FILM COATED ORAL at 08:14

## 2023-05-27 RX ADMIN — FUROSEMIDE 40 MG: 10 INJECTION, SOLUTION INTRAMUSCULAR; INTRAVENOUS at 08:14

## 2023-05-27 RX ADMIN — TIOTROPIUM BROMIDE INHALATION SPRAY 2 PUFF: 3.12 SPRAY, METERED RESPIRATORY (INHALATION) at 07:43

## 2023-05-27 RX ADMIN — MICONAZOLE NITRATE: 2 POWDER TOPICAL at 08:14

## 2023-05-27 RX ADMIN — MOMETASONE FUROATE AND FORMOTEROL FUMARATE DIHYDRATE 2 PUFF: 200; 5 AEROSOL RESPIRATORY (INHALATION) at 20:15

## 2023-05-27 RX ADMIN — HYDRALAZINE HYDROCHLORIDE 25 MG: 25 TABLET, FILM COATED ORAL at 21:53

## 2023-05-27 RX ADMIN — MICONAZOLE NITRATE: 2 POWDER TOPICAL at 21:54

## 2023-05-27 RX ADMIN — RANOLAZINE 500 MG: 500 TABLET, FILM COATED, EXTENDED RELEASE ORAL at 08:14

## 2023-05-27 RX ADMIN — HYDRALAZINE HYDROCHLORIDE 12.5 MG: 25 TABLET, FILM COATED ORAL at 08:14

## 2023-05-27 RX ADMIN — ACETAMINOPHEN 1000 MG: 500 TABLET ORAL at 21:53

## 2023-05-27 RX ADMIN — PANTOPRAZOLE SODIUM 8 MG/HR: 40 INJECTION, POWDER, FOR SOLUTION INTRAVENOUS at 07:45

## 2023-05-27 RX ADMIN — MICONAZOLE NITRATE: 2 POWDER TOPICAL at 08:16

## 2023-05-27 RX ADMIN — DIPHENHYDRAMINE HYDROCHLORIDE, ZINC ACETATE: 2; .1 CREAM TOPICAL at 03:46

## 2023-05-27 RX ADMIN — MOMETASONE FUROATE AND FORMOTEROL FUMARATE DIHYDRATE 2 PUFF: 200; 5 AEROSOL RESPIRATORY (INHALATION) at 07:45

## 2023-05-27 RX ADMIN — ACETAMINOPHEN 1000 MG: 500 TABLET ORAL at 05:37

## 2023-05-27 RX ADMIN — MAGNESIUM SULFATE HEPTAHYDRATE 2000 MG: 40 INJECTION, SOLUTION INTRAVENOUS at 08:23

## 2023-05-27 RX ADMIN — ISOSORBIDE MONONITRATE 15 MG: 30 TABLET, EXTENDED RELEASE ORAL at 08:14

## 2023-05-27 RX ADMIN — RANOLAZINE 500 MG: 500 TABLET, FILM COATED, EXTENDED RELEASE ORAL at 21:53

## 2023-05-27 RX ADMIN — DOFETILIDE 250 MCG: 0.25 CAPSULE ORAL at 21:53

## 2023-05-27 RX ADMIN — SODIUM CHLORIDE, PRESERVATIVE FREE 10 ML: 5 INJECTION INTRAVENOUS at 21:54

## 2023-05-27 RX ADMIN — CARVEDILOL 6.25 MG: 6.25 TABLET, FILM COATED ORAL at 17:52

## 2023-05-27 RX ADMIN — DILTIAZEM HYDROCHLORIDE 120 MG: 120 CAPSULE, COATED, EXTENDED RELEASE ORAL at 08:14

## 2023-05-27 RX ADMIN — HYDROXYZINE PAMOATE 25 MG: 25 CAPSULE ORAL at 04:06

## 2023-05-27 RX ADMIN — DOFETILIDE 250 MCG: 0.25 CAPSULE ORAL at 08:13

## 2023-05-28 LAB
ALBUMIN SERPL-MCNC: 3.1 G/DL (ref 3.4–5)
ANION GAP SERPL CALCULATED.3IONS-SCNC: 6 MMOL/L (ref 3–16)
BASOPHILS # BLD: 0 K/UL (ref 0–0.2)
BASOPHILS NFR BLD: 0.2 %
BUN SERPL-MCNC: 38 MG/DL (ref 7–20)
CALCIUM SERPL-MCNC: 7.9 MG/DL (ref 8.3–10.6)
CHLORIDE SERPL-SCNC: 93 MMOL/L (ref 99–110)
CO2 SERPL-SCNC: 37 MMOL/L (ref 21–32)
CREAT SERPL-MCNC: 1.8 MG/DL (ref 0.6–1.2)
DEPRECATED RDW RBC AUTO: 16.1 % (ref 12.4–15.4)
EOSINOPHIL # BLD: 0.4 K/UL (ref 0–0.6)
EOSINOPHIL NFR BLD: 3.1 %
GFR SERPLBLD CREATININE-BSD FMLA CKD-EPI: 29 ML/MIN/{1.73_M2}
GLUCOSE SERPL-MCNC: 108 MG/DL (ref 70–99)
HCT VFR BLD AUTO: 25.1 % (ref 36–48)
HGB BLD-MCNC: 8.4 G/DL (ref 12–16)
LYMPHOCYTES # BLD: 0.7 K/UL (ref 1–5.1)
LYMPHOCYTES NFR BLD: 5.3 %
MAGNESIUM SERPL-MCNC: 1.9 MG/DL (ref 1.8–2.4)
MCH RBC QN AUTO: 30.1 PG (ref 26–34)
MCHC RBC AUTO-ENTMCNC: 33.4 G/DL (ref 31–36)
MCV RBC AUTO: 90.3 FL (ref 80–100)
MONOCYTES # BLD: 0.9 K/UL (ref 0–1.3)
MONOCYTES NFR BLD: 6.6 %
NEUTROPHILS # BLD: 11.8 K/UL (ref 1.7–7.7)
NEUTROPHILS NFR BLD: 84.8 %
NT-PROBNP SERPL-MCNC: 3958 PG/ML (ref 0–449)
PHOSPHATE SERPL-MCNC: 3.8 MG/DL (ref 2.5–4.9)
PLATELET # BLD AUTO: 176 K/UL (ref 135–450)
PMV BLD AUTO: 9.9 FL (ref 5–10.5)
POTASSIUM SERPL-SCNC: 3.4 MMOL/L (ref 3.5–5.1)
RBC # BLD AUTO: 2.77 M/UL (ref 4–5.2)
SODIUM SERPL-SCNC: 136 MMOL/L (ref 136–145)
WBC # BLD AUTO: 14 K/UL (ref 4–11)

## 2023-05-28 PROCEDURE — 94760 N-INVAS EAR/PLS OXIMETRY 1: CPT

## 2023-05-28 PROCEDURE — 83735 ASSAY OF MAGNESIUM: CPT

## 2023-05-28 PROCEDURE — 6370000000 HC RX 637 (ALT 250 FOR IP): Performed by: NURSE PRACTITIONER

## 2023-05-28 PROCEDURE — 6360000002 HC RX W HCPCS: Performed by: SURGERY

## 2023-05-28 PROCEDURE — 80069 RENAL FUNCTION PANEL: CPT

## 2023-05-28 PROCEDURE — 99024 POSTOP FOLLOW-UP VISIT: CPT | Performed by: SURGERY

## 2023-05-28 PROCEDURE — 1200000000 HC SEMI PRIVATE

## 2023-05-28 PROCEDURE — 94640 AIRWAY INHALATION TREATMENT: CPT

## 2023-05-28 PROCEDURE — 6360000002 HC RX W HCPCS: Performed by: NURSE PRACTITIONER

## 2023-05-28 PROCEDURE — 6370000000 HC RX 637 (ALT 250 FOR IP): Performed by: SURGERY

## 2023-05-28 PROCEDURE — 85025 COMPLETE CBC W/AUTO DIFF WBC: CPT

## 2023-05-28 PROCEDURE — 99232 SBSQ HOSP IP/OBS MODERATE 35: CPT | Performed by: NURSE PRACTITIONER

## 2023-05-28 PROCEDURE — 2580000003 HC RX 258: Performed by: INTERNAL MEDICINE

## 2023-05-28 PROCEDURE — 6360000002 HC RX W HCPCS: Performed by: INTERNAL MEDICINE

## 2023-05-28 PROCEDURE — 2700000000 HC OXYGEN THERAPY PER DAY

## 2023-05-28 PROCEDURE — 83880 ASSAY OF NATRIURETIC PEPTIDE: CPT

## 2023-05-28 PROCEDURE — 2580000003 HC RX 258: Performed by: SURGERY

## 2023-05-28 RX ORDER — ISOSORBIDE MONONITRATE 30 MG/1
30 TABLET, EXTENDED RELEASE ORAL DAILY
Status: DISCONTINUED | OUTPATIENT
Start: 2023-05-29 | End: 2023-05-30 | Stop reason: HOSPADM

## 2023-05-28 RX ORDER — ISOSORBIDE MONONITRATE 30 MG/1
15 TABLET, EXTENDED RELEASE ORAL ONCE
Status: COMPLETED | OUTPATIENT
Start: 2023-05-28 | End: 2023-05-28

## 2023-05-28 RX ORDER — TORSEMIDE 20 MG/1
20 TABLET ORAL 2 TIMES DAILY
Status: DISCONTINUED | OUTPATIENT
Start: 2023-05-28 | End: 2023-05-30 | Stop reason: HOSPADM

## 2023-05-28 RX ORDER — POTASSIUM CHLORIDE 20 MEQ/1
20 TABLET, EXTENDED RELEASE ORAL ONCE
Status: DISCONTINUED | OUTPATIENT
Start: 2023-05-28 | End: 2023-05-29

## 2023-05-28 RX ORDER — MAGNESIUM SULFATE 1 G/100ML
1000 INJECTION INTRAVENOUS ONCE
Status: COMPLETED | OUTPATIENT
Start: 2023-05-28 | End: 2023-05-28

## 2023-05-28 RX ADMIN — TORSEMIDE 20 MG: 20 TABLET ORAL at 16:17

## 2023-05-28 RX ADMIN — CARVEDILOL 6.25 MG: 6.25 TABLET, FILM COATED ORAL at 16:17

## 2023-05-28 RX ADMIN — MICONAZOLE NITRATE: 2 POWDER TOPICAL at 08:22

## 2023-05-28 RX ADMIN — SODIUM CHLORIDE, PRESERVATIVE FREE 10 ML: 5 INJECTION INTRAVENOUS at 08:24

## 2023-05-28 RX ADMIN — ACETAMINOPHEN 1000 MG: 500 TABLET ORAL at 05:59

## 2023-05-28 RX ADMIN — PANTOPRAZOLE SODIUM 40 MG: 40 TABLET, DELAYED RELEASE ORAL at 05:59

## 2023-05-28 RX ADMIN — DOFETILIDE 250 MCG: 0.25 CAPSULE ORAL at 20:51

## 2023-05-28 RX ADMIN — MICONAZOLE NITRATE: 2 POWDER TOPICAL at 20:46

## 2023-05-28 RX ADMIN — ISOSORBIDE MONONITRATE 15 MG: 30 TABLET, EXTENDED RELEASE ORAL at 08:22

## 2023-05-28 RX ADMIN — CARVEDILOL 6.25 MG: 6.25 TABLET, FILM COATED ORAL at 08:21

## 2023-05-28 RX ADMIN — HYDRALAZINE HYDROCHLORIDE 25 MG: 25 TABLET, FILM COATED ORAL at 08:22

## 2023-05-28 RX ADMIN — RANOLAZINE 500 MG: 500 TABLET, FILM COATED, EXTENDED RELEASE ORAL at 08:22

## 2023-05-28 RX ADMIN — DOFETILIDE 250 MCG: 0.25 CAPSULE ORAL at 08:23

## 2023-05-28 RX ADMIN — DILTIAZEM HYDROCHLORIDE 120 MG: 120 CAPSULE, COATED, EXTENDED RELEASE ORAL at 08:22

## 2023-05-28 RX ADMIN — HYDRALAZINE HYDROCHLORIDE 25 MG: 25 TABLET, FILM COATED ORAL at 20:45

## 2023-05-28 RX ADMIN — SODIUM CHLORIDE, PRESERVATIVE FREE 10 ML: 5 INJECTION INTRAVENOUS at 20:47

## 2023-05-28 RX ADMIN — IRON SUCROSE 300 MG: 20 INJECTION, SOLUTION INTRAVENOUS at 10:01

## 2023-05-28 RX ADMIN — ACETAMINOPHEN 1000 MG: 500 TABLET ORAL at 13:15

## 2023-05-28 RX ADMIN — ROSUVASTATIN CALCIUM 20 MG: 20 TABLET, FILM COATED ORAL at 08:22

## 2023-05-28 RX ADMIN — MOMETASONE FUROATE AND FORMOTEROL FUMARATE DIHYDRATE 2 PUFF: 200; 5 AEROSOL RESPIRATORY (INHALATION) at 07:59

## 2023-05-28 RX ADMIN — TIOTROPIUM BROMIDE INHALATION SPRAY 2 PUFF: 3.12 SPRAY, METERED RESPIRATORY (INHALATION) at 07:57

## 2023-05-28 RX ADMIN — MAGNESIUM SULFATE HEPTAHYDRATE 1000 MG: 1 INJECTION, SOLUTION INTRAVENOUS at 13:18

## 2023-05-28 RX ADMIN — SPIRONOLACTONE 25 MG: 25 TABLET ORAL at 08:21

## 2023-05-28 RX ADMIN — POTASSIUM CHLORIDE 40 MEQ: 1500 TABLET, EXTENDED RELEASE ORAL at 08:22

## 2023-05-28 RX ADMIN — ACETAMINOPHEN 1000 MG: 500 TABLET ORAL at 20:46

## 2023-05-28 RX ADMIN — RANOLAZINE 500 MG: 500 TABLET, FILM COATED, EXTENDED RELEASE ORAL at 20:46

## 2023-05-28 RX ADMIN — ISOSORBIDE MONONITRATE 15 MG: 30 TABLET, EXTENDED RELEASE ORAL at 09:58

## 2023-05-28 ASSESSMENT — PAIN SCALES - GENERAL: PAINLEVEL_OUTOF10: 0

## 2023-05-29 LAB
ALBUMIN SERPL-MCNC: 3.3 G/DL (ref 3.4–5)
ANION GAP SERPL CALCULATED.3IONS-SCNC: 8 MMOL/L (ref 3–16)
BASOPHILS # BLD: 0 K/UL (ref 0–0.2)
BASOPHILS NFR BLD: 0.3 %
BUN SERPL-MCNC: 37 MG/DL (ref 7–20)
CALCIUM SERPL-MCNC: 8.4 MG/DL (ref 8.3–10.6)
CHLORIDE SERPL-SCNC: 94 MMOL/L (ref 99–110)
CO2 SERPL-SCNC: 34 MMOL/L (ref 21–32)
CREAT SERPL-MCNC: 1.7 MG/DL (ref 0.6–1.2)
DEPRECATED RDW RBC AUTO: 16.2 % (ref 12.4–15.4)
EOSINOPHIL # BLD: 0.7 K/UL (ref 0–0.6)
EOSINOPHIL NFR BLD: 4.6 %
GFR SERPLBLD CREATININE-BSD FMLA CKD-EPI: 31 ML/MIN/{1.73_M2}
GLUCOSE SERPL-MCNC: 91 MG/DL (ref 70–99)
HCT VFR BLD AUTO: 29.7 % (ref 36–48)
HGB BLD-MCNC: 9.8 G/DL (ref 12–16)
LYMPHOCYTES # BLD: 0.8 K/UL (ref 1–5.1)
LYMPHOCYTES NFR BLD: 5.3 %
MAGNESIUM SERPL-MCNC: 2 MG/DL (ref 1.8–2.4)
MCH RBC QN AUTO: 30 PG (ref 26–34)
MCHC RBC AUTO-ENTMCNC: 33 G/DL (ref 31–36)
MCV RBC AUTO: 90.9 FL (ref 80–100)
MONOCYTES # BLD: 1.3 K/UL (ref 0–1.3)
MONOCYTES NFR BLD: 8.7 %
NEUTROPHILS # BLD: 12 K/UL (ref 1.7–7.7)
NEUTROPHILS NFR BLD: 81.1 %
PHOSPHATE SERPL-MCNC: 3.5 MG/DL (ref 2.5–4.9)
PLATELET # BLD AUTO: 214 K/UL (ref 135–450)
PMV BLD AUTO: 9.4 FL (ref 5–10.5)
POTASSIUM SERPL-SCNC: 3.7 MMOL/L (ref 3.5–5.1)
PROCALCITONIN SERPL IA-MCNC: 0.16 NG/ML (ref 0–0.15)
RBC # BLD AUTO: 3.27 M/UL (ref 4–5.2)
SODIUM SERPL-SCNC: 136 MMOL/L (ref 136–145)
WBC # BLD AUTO: 14.8 K/UL (ref 4–11)

## 2023-05-29 PROCEDURE — 99024 POSTOP FOLLOW-UP VISIT: CPT | Performed by: SURGERY

## 2023-05-29 PROCEDURE — 6370000000 HC RX 637 (ALT 250 FOR IP): Performed by: SURGERY

## 2023-05-29 PROCEDURE — 85025 COMPLETE CBC W/AUTO DIFF WBC: CPT

## 2023-05-29 PROCEDURE — 1200000000 HC SEMI PRIVATE

## 2023-05-29 PROCEDURE — 94640 AIRWAY INHALATION TREATMENT: CPT

## 2023-05-29 PROCEDURE — 6370000000 HC RX 637 (ALT 250 FOR IP): Performed by: NURSE PRACTITIONER

## 2023-05-29 PROCEDURE — 6370000000 HC RX 637 (ALT 250 FOR IP): Performed by: INTERNAL MEDICINE

## 2023-05-29 PROCEDURE — 2580000003 HC RX 258: Performed by: SURGERY

## 2023-05-29 PROCEDURE — 36415 COLL VENOUS BLD VENIPUNCTURE: CPT

## 2023-05-29 PROCEDURE — 94760 N-INVAS EAR/PLS OXIMETRY 1: CPT

## 2023-05-29 PROCEDURE — 83735 ASSAY OF MAGNESIUM: CPT

## 2023-05-29 PROCEDURE — 2580000003 HC RX 258: Performed by: INTERNAL MEDICINE

## 2023-05-29 PROCEDURE — 84145 PROCALCITONIN (PCT): CPT

## 2023-05-29 PROCEDURE — 36592 COLLECT BLOOD FROM PICC: CPT

## 2023-05-29 PROCEDURE — 2700000000 HC OXYGEN THERAPY PER DAY

## 2023-05-29 PROCEDURE — 6360000002 HC RX W HCPCS: Performed by: INTERNAL MEDICINE

## 2023-05-29 PROCEDURE — 80069 RENAL FUNCTION PANEL: CPT

## 2023-05-29 RX ORDER — POTASSIUM CHLORIDE 20 MEQ/1
40 TABLET, EXTENDED RELEASE ORAL ONCE
Status: COMPLETED | OUTPATIENT
Start: 2023-05-29 | End: 2023-05-29

## 2023-05-29 RX ORDER — SODIUM PHOSPHATE, DIBASIC AND SODIUM PHOSPHATE, MONOBASIC 7; 19 G/133ML; G/133ML
1 ENEMA RECTAL ONCE
Status: COMPLETED | OUTPATIENT
Start: 2023-05-29 | End: 2023-05-29

## 2023-05-29 RX ORDER — DOFETILIDE 0.12 MG/1
250 CAPSULE ORAL EVERY 12 HOURS SCHEDULED
Status: DISCONTINUED | OUTPATIENT
Start: 2023-05-29 | End: 2023-05-30 | Stop reason: HOSPADM

## 2023-05-29 RX ADMIN — MICONAZOLE NITRATE: 2 POWDER TOPICAL at 08:35

## 2023-05-29 RX ADMIN — IRON SUCROSE 300 MG: 20 INJECTION, SOLUTION INTRAVENOUS at 10:55

## 2023-05-29 RX ADMIN — PANTOPRAZOLE SODIUM 40 MG: 40 TABLET, DELAYED RELEASE ORAL at 06:11

## 2023-05-29 RX ADMIN — ISOSORBIDE MONONITRATE 30 MG: 30 TABLET, EXTENDED RELEASE ORAL at 08:34

## 2023-05-29 RX ADMIN — SPIRONOLACTONE 25 MG: 25 TABLET ORAL at 08:34

## 2023-05-29 RX ADMIN — MOMETASONE FUROATE AND FORMOTEROL FUMARATE DIHYDRATE 2 PUFF: 200; 5 AEROSOL RESPIRATORY (INHALATION) at 21:10

## 2023-05-29 RX ADMIN — DOFETILIDE 250 MCG: 0.25 CAPSULE ORAL at 08:34

## 2023-05-29 RX ADMIN — CARVEDILOL 6.25 MG: 6.25 TABLET, FILM COATED ORAL at 08:34

## 2023-05-29 RX ADMIN — RANOLAZINE 500 MG: 500 TABLET, FILM COATED, EXTENDED RELEASE ORAL at 08:34

## 2023-05-29 RX ADMIN — MICONAZOLE NITRATE: 2 POWDER TOPICAL at 20:45

## 2023-05-29 RX ADMIN — HYDRALAZINE HYDROCHLORIDE 25 MG: 25 TABLET, FILM COATED ORAL at 08:34

## 2023-05-29 RX ADMIN — RANOLAZINE 500 MG: 500 TABLET, FILM COATED, EXTENDED RELEASE ORAL at 20:44

## 2023-05-29 RX ADMIN — CARVEDILOL 6.25 MG: 6.25 TABLET, FILM COATED ORAL at 17:57

## 2023-05-29 RX ADMIN — TIOTROPIUM BROMIDE INHALATION SPRAY 2 PUFF: 3.12 SPRAY, METERED RESPIRATORY (INHALATION) at 09:17

## 2023-05-29 RX ADMIN — SODIUM CHLORIDE, PRESERVATIVE FREE 10 ML: 5 INJECTION INTRAVENOUS at 08:35

## 2023-05-29 RX ADMIN — TORSEMIDE 20 MG: 20 TABLET ORAL at 20:44

## 2023-05-29 RX ADMIN — POTASSIUM CHLORIDE 40 MEQ: 1500 TABLET, EXTENDED RELEASE ORAL at 13:48

## 2023-05-29 RX ADMIN — MOMETASONE FUROATE AND FORMOTEROL FUMARATE DIHYDRATE 2 PUFF: 200; 5 AEROSOL RESPIRATORY (INHALATION) at 09:19

## 2023-05-29 RX ADMIN — ROSUVASTATIN CALCIUM 20 MG: 20 TABLET, FILM COATED ORAL at 08:34

## 2023-05-29 RX ADMIN — SODIUM CHLORIDE, PRESERVATIVE FREE 10 ML: 5 INJECTION INTRAVENOUS at 20:45

## 2023-05-29 RX ADMIN — DILTIAZEM HYDROCHLORIDE 120 MG: 120 CAPSULE, COATED, EXTENDED RELEASE ORAL at 08:34

## 2023-05-29 RX ADMIN — HYDRALAZINE HYDROCHLORIDE 25 MG: 25 TABLET, FILM COATED ORAL at 20:45

## 2023-05-29 RX ADMIN — ACETAMINOPHEN 1000 MG: 500 TABLET ORAL at 06:11

## 2023-05-29 RX ADMIN — TORSEMIDE 20 MG: 20 TABLET ORAL at 08:34

## 2023-05-29 RX ADMIN — DOFETILIDE 250 MCG: 0.25 CAPSULE ORAL at 20:44

## 2023-05-29 RX ADMIN — SODIUM PHOSPHATE, DIBASIC AND SODIUM PHOSPHATE, MONOBASIC 1 ENEMA: 7; 19 ENEMA RECTAL at 15:24

## 2023-05-29 RX ADMIN — RIVAROXABAN 15 MG: 15 TABLET, FILM COATED ORAL at 08:37

## 2023-05-29 NOTE — PLAN OF CARE
Problem: Discharge Planning  Goal: Discharge to home or other facility with appropriate resources  5/29/2023 0501 by Abel Dance, RN  Outcome: Progressing  Flowsheets (Taken 5/29/2023 0003)  Discharge to home or other facility with appropriate resources: Identify barriers to discharge with patient and caregiver     Problem: Pain  Goal: Verbalizes/displays adequate comfort level or baseline comfort level  5/29/2023 0501 by Abel Dance, RN  Outcome: Progressing  Flowsheets (Taken 5/29/2023 0501)  Verbalizes/displays adequate comfort level or baseline comfort level:   Encourage patient to monitor pain and request assistance   Assess pain using appropriate pain scale   Administer analgesics based on type and severity of pain and evaluate response   Implement non-pharmacological measures as appropriate and evaluate response     Problem: Safety - Adult  Goal: Free from fall injury  5/29/2023 0501 by Abel Dance, RN  Outcome: Progressing  Note: Patient assessed for fall risk; fall precautions initiated. Patient and family instructed about safety devices. Environment kept free of clutter and adequate lighting provided. Bed locked and in lowest position. Call light within reach. Will continue to monitor. Problem: ABCDS Injury Assessment  Goal: Absence of physical injury  5/29/2023 0501 by Abel Dance, RN  Outcome: Progressing  Flowsheets (Taken 5/29/2023 0501)  Absence of Physical Injury: Implement safety measures based on patient assessment     Problem: Skin/Tissue Integrity  Goal: Absence of new skin breakdown  Description: 1. Monitor for areas of redness and/or skin breakdown  2. Assess vascular access sites hourly  3. Every 4-6 hours minimum:  Change oxygen saturation probe site  4. Every 4-6 hours:  If on nasal continuous positive airway pressure, respiratory therapy assess nares and determine need for appliance change or resting period.   5/29/2023 0501 by Abel Dance, RN  Outcome:

## 2023-05-29 NOTE — PLAN OF CARE
Problem: Discharge Planning  Goal: Discharge to home or other facility with appropriate resources  Outcome: Progressing     Problem: Pain  Goal: Verbalizes/displays adequate comfort level or baseline comfort level  Outcome: Progressing     Problem: Safety - Adult  Goal: Free from fall injury  Outcome: Progressing     Problem: ABCDS Injury Assessment  Goal: Absence of physical injury  Outcome: Progressing     Problem: Skin/Tissue Integrity  Goal: Absence of new skin breakdown  Description: 1. Monitor for areas of redness and/or skin breakdown  2. Assess vascular access sites hourly  3. Every 4-6 hours minimum:  Change oxygen saturation probe site  4. Every 4-6 hours:  If on nasal continuous positive airway pressure, respiratory therapy assess nares and determine need for appliance change or resting period.   Outcome: Progressing     Problem: Respiratory - Adult  Goal: Achieves optimal ventilation and oxygenation  Outcome: Progressing     Problem: Musculoskeletal - Adult  Goal: Return mobility to safest level of function  Outcome: Progressing  Goal: Return ADL status to a safe level of function  Outcome: Progressing     Problem: Hematologic - Adult  Goal: Maintains hematologic stability  Outcome: Progressing     Problem: Chronic Conditions and Co-morbidities  Goal: Patient's chronic conditions and co-morbidity symptoms are monitored and maintained or improved  Outcome: Progressing     Problem: Nutrition Deficit:  Goal: Optimize nutritional status  Outcome: Progressing

## 2023-05-30 VITALS
RESPIRATION RATE: 22 BRPM | SYSTOLIC BLOOD PRESSURE: 122 MMHG | HEIGHT: 64 IN | BODY MASS INDEX: 42.53 KG/M2 | DIASTOLIC BLOOD PRESSURE: 44 MMHG | OXYGEN SATURATION: 94 % | WEIGHT: 249.12 LBS | TEMPERATURE: 98.3 F | HEART RATE: 56 BPM

## 2023-05-30 LAB
ANION GAP SERPL CALCULATED.3IONS-SCNC: 7 MMOL/L (ref 3–16)
BASOPHILS # BLD: 0.1 K/UL (ref 0–0.2)
BASOPHILS NFR BLD: 0.5 %
BUN SERPL-MCNC: 33 MG/DL (ref 7–20)
CALCIUM SERPL-MCNC: 8.4 MG/DL (ref 8.3–10.6)
CHLORIDE SERPL-SCNC: 98 MMOL/L (ref 99–110)
CO2 SERPL-SCNC: 34 MMOL/L (ref 21–32)
CREAT SERPL-MCNC: 1.7 MG/DL (ref 0.6–1.2)
DEPRECATED RDW RBC AUTO: 16.4 % (ref 12.4–15.4)
EOSINOPHIL # BLD: 0.7 K/UL (ref 0–0.6)
EOSINOPHIL NFR BLD: 5.6 %
GFR SERPLBLD CREATININE-BSD FMLA CKD-EPI: 31 ML/MIN/{1.73_M2}
GLUCOSE SERPL-MCNC: 87 MG/DL (ref 70–99)
HCT VFR BLD AUTO: 27.8 % (ref 36–48)
HGB BLD-MCNC: 9.2 G/DL (ref 12–16)
LYMPHOCYTES # BLD: 0.7 K/UL (ref 1–5.1)
LYMPHOCYTES NFR BLD: 5.2 %
MAGNESIUM SERPL-MCNC: 1.8 MG/DL (ref 1.8–2.4)
MCH RBC QN AUTO: 30.5 PG (ref 26–34)
MCHC RBC AUTO-ENTMCNC: 33 G/DL (ref 31–36)
MCV RBC AUTO: 92.5 FL (ref 80–100)
MONOCYTES # BLD: 1.1 K/UL (ref 0–1.3)
MONOCYTES NFR BLD: 8.9 %
NEUTROPHILS # BLD: 10.2 K/UL (ref 1.7–7.7)
NEUTROPHILS NFR BLD: 79.8 %
PLATELET # BLD AUTO: 205 K/UL (ref 135–450)
PMV BLD AUTO: 9.7 FL (ref 5–10.5)
POTASSIUM SERPL-SCNC: 4.1 MMOL/L (ref 3.5–5.1)
RBC # BLD AUTO: 3.01 M/UL (ref 4–5.2)
SODIUM SERPL-SCNC: 139 MMOL/L (ref 136–145)
WBC # BLD AUTO: 12.8 K/UL (ref 4–11)

## 2023-05-30 PROCEDURE — 97530 THERAPEUTIC ACTIVITIES: CPT | Performed by: PHYSICAL THERAPIST

## 2023-05-30 PROCEDURE — 2700000000 HC OXYGEN THERAPY PER DAY

## 2023-05-30 PROCEDURE — 2580000003 HC RX 258: Performed by: SURGERY

## 2023-05-30 PROCEDURE — 99024 POSTOP FOLLOW-UP VISIT: CPT | Performed by: SURGERY

## 2023-05-30 PROCEDURE — 6370000000 HC RX 637 (ALT 250 FOR IP): Performed by: SURGERY

## 2023-05-30 PROCEDURE — 85025 COMPLETE CBC W/AUTO DIFF WBC: CPT

## 2023-05-30 PROCEDURE — 80048 BASIC METABOLIC PNL TOTAL CA: CPT

## 2023-05-30 PROCEDURE — APPNB15 APP NON BILLABLE TIME 0-15 MINS: Performed by: PHYSICIAN ASSISTANT

## 2023-05-30 PROCEDURE — 6370000000 HC RX 637 (ALT 250 FOR IP): Performed by: INTERNAL MEDICINE

## 2023-05-30 PROCEDURE — 83735 ASSAY OF MAGNESIUM: CPT

## 2023-05-30 PROCEDURE — 36415 COLL VENOUS BLD VENIPUNCTURE: CPT

## 2023-05-30 PROCEDURE — 6370000000 HC RX 637 (ALT 250 FOR IP): Performed by: NURSE PRACTITIONER

## 2023-05-30 PROCEDURE — 94640 AIRWAY INHALATION TREATMENT: CPT

## 2023-05-30 PROCEDURE — 97116 GAIT TRAINING THERAPY: CPT | Performed by: PHYSICAL THERAPIST

## 2023-05-30 RX ORDER — LANOLIN ALCOHOL/MO/W.PET/CERES
400 CREAM (GRAM) TOPICAL DAILY
Status: DISCONTINUED | OUTPATIENT
Start: 2023-05-30 | End: 2023-05-30 | Stop reason: HOSPADM

## 2023-05-30 RX ORDER — PANTOPRAZOLE SODIUM 40 MG/1
40 TABLET, DELAYED RELEASE ORAL
Qty: 30 TABLET | Refills: 3 | Status: SHIPPED | OUTPATIENT
Start: 2023-05-30

## 2023-05-30 RX ADMIN — DOFETILIDE 250 MCG: 0.25 CAPSULE ORAL at 08:51

## 2023-05-30 RX ADMIN — TIOTROPIUM BROMIDE INHALATION SPRAY 2 PUFF: 3.12 SPRAY, METERED RESPIRATORY (INHALATION) at 08:30

## 2023-05-30 RX ADMIN — RANOLAZINE 500 MG: 500 TABLET, FILM COATED, EXTENDED RELEASE ORAL at 08:52

## 2023-05-30 RX ADMIN — PANTOPRAZOLE SODIUM 40 MG: 40 TABLET, DELAYED RELEASE ORAL at 06:08

## 2023-05-30 RX ADMIN — SPIRONOLACTONE 25 MG: 25 TABLET ORAL at 08:53

## 2023-05-30 RX ADMIN — TORSEMIDE 20 MG: 20 TABLET ORAL at 08:52

## 2023-05-30 RX ADMIN — HYDRALAZINE HYDROCHLORIDE 25 MG: 25 TABLET, FILM COATED ORAL at 08:53

## 2023-05-30 RX ADMIN — MICONAZOLE NITRATE: 2 POWDER TOPICAL at 08:50

## 2023-05-30 RX ADMIN — Medication 400 MG: at 13:06

## 2023-05-30 RX ADMIN — SODIUM CHLORIDE, PRESERVATIVE FREE 10 ML: 5 INJECTION INTRAVENOUS at 08:50

## 2023-05-30 RX ADMIN — ROSUVASTATIN CALCIUM 20 MG: 20 TABLET, FILM COATED ORAL at 08:52

## 2023-05-30 RX ADMIN — MOMETASONE FUROATE AND FORMOTEROL FUMARATE DIHYDRATE 2 PUFF: 200; 5 AEROSOL RESPIRATORY (INHALATION) at 08:30

## 2023-05-30 RX ADMIN — DILTIAZEM HYDROCHLORIDE 120 MG: 120 CAPSULE, COATED, EXTENDED RELEASE ORAL at 08:52

## 2023-05-30 RX ADMIN — ISOSORBIDE MONONITRATE 30 MG: 30 TABLET, EXTENDED RELEASE ORAL at 08:52

## 2023-05-30 RX ADMIN — CARVEDILOL 6.25 MG: 6.25 TABLET, FILM COATED ORAL at 08:52

## 2023-05-30 ASSESSMENT — ENCOUNTER SYMPTOMS
NAUSEA: 0
ABDOMINAL PAIN: 0
SHORTNESS OF BREATH: 1
ROS SKIN COMMENTS: ITCHING

## 2023-05-30 ASSESSMENT — PAIN SCALES - GENERAL: PAINLEVEL_OUTOF10: 0

## 2023-05-30 NOTE — DISCHARGE SUMMARY
V2.0  Discharge Summary    Name:  Damari Gil /Age/Sex: 1945 (78 y.o. female)   Admit Date: 2023  Discharge Date: 23    MRN & CSN:  [id number] & [id number] Encounter Date and Time 23 12:36 PM EDT    Attending:  Adelfo Daniels MD Discharging Provider: CHRISTINE Tucker - CNP       Hospital Course:     Brief HPI: Damari Gil is a 69 y.o. female GI bleed, AAA, atrial fibrillation, CAD, CHF, COPD, hyperlipidemia, hypertension was admitted to acute on chronic anemia due to GI bleeding. GI consulted. Pt underwent EGD with clipping on May 26. Pt's hemoglobin has been stable at 9.2. PT/OT evaluated and recommended SNF. However patient and her family declined. Pt will be discharged home today with home health care (skilled nurse, PT and OT). Pt will continue PPI, resume other home medications including Xarelto. Follow up with GI, cardiology and pcp. Brief Problem Based Course:   Dieulafoy lesion  -lesion was actively bleeding  -23 push enterscopy with clipping  -Surgery consulted  -PPI p.o. Acute blood loss anemia secondary to GI bleed  Likely upper GI bleed  -Hemoglobin 4.4 on admission, today 9.8  -Xarelto resumed 2023, monitor for bleeding  -Transfuse packed red blood cells, goal hemoglobin greater than 8 given CAD history  -Has had 13 units PRBC this admission  -Monitor closely  -PPI p.o. Acute on chronic systolic congestive heart failure  -BNP elevated  -Chest x-ray clear  -On review of outpatient cardiology notes, she recently saw cardiologist 2023 who adjusted her torsemide dose for fluid overload. -Cardiology consulted. Placed on a Lasix drip but then discontinued. Transition back to oral diuretic  -Continue daily weights, fluid restriction  -No ACE or ARB secondary to renal failure.   No beta-blocker secondary to hypotension  -Continue telemetry     Past medical history A-fib  -Xarelto resumed 2023  -Continue Cardizem CD, Tikosyn     MARK in CKD stage

## 2023-05-30 NOTE — NURSE NAVIGATOR
Dc order noted. Pt has received >60 mins of HF education at bedside. HF dc instructions are on AVS as well as on TOMEKA as pt going home with [facility].  Pt has follow up appts in place:

## 2023-05-30 NOTE — PROGRESS NOTES
Arm is red and painful to touch at site. Site leaking and there is no blood return out of any lumen. Picc removed.
Gastroenterology Progress Note    No acute events overnight  No blood per rectum. Tolerating regular diet   Hemoglobin 9.8. BP (!) 154/54   Pulse 69   Temp 97.9 °F (36.6 °C) (Oral)   Resp 20   Ht 5' 4\" (1.626 m)   Wt 247 lb 9.2 oz (112.3 kg)   SpO2 98%   BMI 42.50 kg/m²     Gen: NAD, WDWN    Abdomen:  Soft, non-distended, non-tender, small abdominal incisions noted. HEENT: MMM   Ext: non tender, no edema     Labs:  Recent Labs     05/27/23  0547 05/28/23  0602 05/29/23  0725   WBC 16.0* 14.0* 14.8*   HGB 7.7* 8.4* 9.8*   HCT 23.3* 25.1* 29.7*    176 214       Recent Labs     05/27/23  0547 05/28/23  0602 05/29/23  0725    136 136   K 3.7 3.4* 3.7   CL 94* 93* 94*   CO2 36* 37* 34*   BUN 43* 38* 37*       No results for input(s): ALB, ALT in the last 72 hours. Invalid input(s): CBILI, TBILI, SGOT, ASTPC, ALTPC, GPT, AP      Invalid input(s): AMYL, LIP    FL UGI W SMALL BOWEL   Final Result   Upper GI:      Increased tertiary contractions in the distal esophagus, mild moderate in   degree, with delayed esophageal emptying. Small hiatal hernia is seen. Small-bowel follow-through:      No focal small bowel filling defect. Contrast reaches colon by 6 hours         CTA ABDOMEN PELVIS W WO CONTRAST   Final Result   1. Mild gastric and proximal duodenal distension with caliber change at the   level of the 2nd portion of the duodenum. While this exam does not define a   discrete mass, consider follow-up with upper GI fluoroscopy if endoscopy   could not evaluate this area. 2.  No CT evidence for active GI bleed. 3.  Status post endovascular repair of infrarenal aortic aneurysm without   endoleak. XR CHEST PORTABLE   Final Result   1. No acute cardiopulmonary findings. 2.  Right-sided PICC line catheter with the tip in the superior vena cava         XR CHEST PORTABLE   Final Result   No acute abnormality.               Assessment:     Fernanda Lopez is a 69
General Surgery  Daily Progress Note    Pt Name: Bhaskar Barger [place] Record Number: [id number]  Date of Birth 1945   Today's Date: 5/30/2023    Chief Complaint   Patient presents with    Shortness of Breath       ASSESSMENT/PLAN  GI bleed secondary to dieulafoy lesion s/p lap assisted push enteroscopy with clipping POD 4    --H/H stable. Back on xarelto  -continue daily PPI  -tolerating regular PO. Having GI function  Continue PT/OT  -pulm toilet, IS use  -OK for discharge home per surgery. Follow up in 2 weeks       Yamila Chavez is doing well. Tolerating diet. Passing flatus.  + BM. OBJECTIVE  VITALS:  height is 5' 4\" (1.626 m) and weight is 249 lb 1.9 oz (113 kg). Her oral temperature is 98.3 °F (36.8 °C). Her blood pressure is 122/44 (abnormal) and her pulse is 56. Her respiration is 22 and oxygen saturation is 94%. GENERAL: alert, no distress  LUNGS: normal respiratory effort, no accessory muscle use  HEART: normal rate and regular rhythm  ABDOMEN: soft, ND, appropriately tender, incisions c/d/I, no erythema or indruation  EXTREMITY: no cyanosis and no clubbing  I/O last 3 completed shifts: In: 1140 [P.O.:1140]  Out: 0207 [Urine:2850; Stool:1]  No intake/output data recorded.     LABS  Recent Labs     05/29/23  0725 05/30/23  0600 05/30/23  0859   WBC 14.8*  --  12.8*   HGB 9.8*  --  9.2*   HCT 29.7*  --  27.8*     --  205    139  --    K 3.7 4.1  --    CL 94* 98*  --    CO2 34* 34*  --    BUN 37* 33*  --    CREATININE 1.7* 1.7*  --    MG 2.00 1.80  --    PHOS 3.5  --   --    CALCIUM 8.4 8.4  --      CBC with Differential:    Lab Results   Component Value Date/Time    WBC 12.8 05/30/2023 08:59 AM    RBC 3.01 05/30/2023 08:59 AM    RBC 4.19 04/13/2017 12:56 PM    HGB 9.2 05/30/2023 08:59 AM    HCT 27.8 05/30/2023 08:59 AM     05/30/2023 08:59 AM    MCV 92.5 05/30/2023 08:59 AM    MCH 30.5 05/30/2023 08:59 AM    MCHC 33.0 05/30/2023 08:59 AM    RDW 16.4
General Surgery  Daily Progress Note    Pt Name: Bhaskar Barger [place] Record Number: [id number]  Date of Birth 1945   Today's Date: 5/29/2023    Chief Complaint   Patient presents with    Shortness of Breath       ASSESSMENT/PLAN  GI bleed secondary to dieulafoy lesion s/p lap assisted push enteroscopy with clipping    --H/H stable. Continue to trend. OK to resume DOAC today  -continue daily PPI  -tolerating regular PO  -recommend D/C [place]  -pulm toilet, IS use  -home in next day or 2      SUBJECTIVE  Kourtney Horowitz is doing well postop. Reports pain adequately controlled. Passing flatus. Denies BM. Tolerating regular diet    OBJECTIVE  VITALS:  height is 5' 4\" (1.626 m) and weight is 247 lb 9.2 oz (112.3 kg). Her axillary temperature is 97.8 °F (36.6 °C). Her blood pressure is 154/54 (abnormal) and her pulse is 69. Her respiration is 20 and oxygen saturation is 98%. GENERAL: alert, no distress  LUNGS: normal respiratory effort, no accessory muscle use  HEART: normal rate and regular rhythm  ABDOMEN: soft, ND, appropriately tender. Wounds clean  EXTREMITY: no cyanosis and no clubbing  I/O last 3 completed shifts: In: 240 [P.O.:240]  Out: 9093 [Urine:4050]  No intake/output data recorded.     LABS  Recent Labs     05/29/23  0725   WBC 14.8*   HGB 9.8*   HCT 29.7*         K 3.7   CL 94*   CO2 34*   BUN 37*   CREATININE 1.7*   MG 2.00   PHOS 3.5   CALCIUM 8.4     CBC with Differential:    Lab Results   Component Value Date/Time    WBC 14.8 05/29/2023 07:25 AM    RBC 3.27 05/29/2023 07:25 AM    RBC 4.19 04/13/2017 12:56 PM    HGB 9.8 05/29/2023 07:25 AM    HCT 29.7 05/29/2023 07:25 AM     05/29/2023 07:25 AM    MCV 90.9 05/29/2023 07:25 AM    MCH 30.0 05/29/2023 07:25 AM    MCHC 33.0 05/29/2023 07:25 AM    RDW 16.2 05/29/2023 07:25 AM    SEGSPCT 75.6 06/27/2012 08:37 AM    BANDSPCT 1 06/30/2015 06:02 AM    LYMPHOPCT 5.3 05/29/2023 07:25 AM    LYMPHOPCT 21.9 04/13/2017 12:56 PM
Hospitalist Progress Note      PCP: Seymour Penny MD    Date of Admission: 5/19/2023    Chief Complaint: GI bleed and shortness of breath. Hospital Course: Juno Natarajan is a 69 y.o. female with pmh of GI bleed, AAA, atrial fibrillation, CAD, CHF, COPD, hyperlipidemia, hypertension who presents with persistent and worsening shortness of breath although patient is chronically on 3 to 4 L oxygen at home. He was worsening over the course of the day today but patient's  notes that it is never truly improved even since her most recent admission. She had an EGD and colonoscopy more recently last month or so both to resolve areas of bleeding. Patient not had any new bloody stools over the last week prior to today's episode of shortness of breath. She has possible bloody stools currently. She has been taking her home medication including her iron tablets and her blood thinners have been adjusted to decrease her Xarelto and stop her Plavix. Apart from shortness breath however, patient's  denies that she has any other recent symptoms of dizziness, syncope, chest pain, dysuria, cough or sputum production, fever or chills, blood in urine or sputum, leg swelling or rashes from baseline,    Subjective: Pt laying in bed feels improved. C/o of itching. No family at bedside. Denies cp, worsening sob, palpitations, abd pain. Reviewed POC,   Denies further needs or questions. Assessment/Plan:    Dieulafoy lesion  -lesion was actively bleeding  -5/26/23 push enterscopy with clipping  -Surgery consulted  -PPI p.o. Acute blood loss anemia secondary to GI bleed  Likely upper GI bleed  -Hemoglobin 4.4 on admission, today 9.8  -Xarelto resumed 5/29/2023, monitor for bleeding  -Transfuse packed red blood cells, goal hemoglobin greater than 8 given CAD history  -Has had 13 units PRBC this admission  -Monitor closely  -PPI p.o.      Acute on chronic systolic congestive heart failure  -BNP elevated  -Chest
Lab attempted to draw pt labs x1 and was only successful at getting BMP. Pt refused to let lab try again for her CBC. Pt educated on importance by this RN and still refusing. Re-timed to try again for 0900 per lab.
PALLIATIVE MEDICINE PROGRESS NOTE     Patient name:Janae Briggs    [facility]:[id number] :1945  Room/Bed:L9P-1595/5122-01    LOS: 11 days        ASSESSMENT/RECOMMENDATIONS     69 y.o. female with acute GI bleed, chronic diastolic HF, debility and pruritis. Acute GI bleed - Resolved, Hgb stable. Dieulafoy lesion clipping complete on 23. Xarelto resumed. Diastolic HF - Management per cardiology. Now on oral diuretics, continue fluid restriction and heart healthy diet. Debility - Patient and family continue to decline SNF. Encouraged  to pursue in home assistance. Plans to discharge with skilled services in the home. Pruritus - Improved. Continue miconazole to affected areas, ensure skin is clean and dry. Patient/Family Goals of Care :    23 - Met with the patient and her  in the room. Patient has had several admissions due to CHF and GI bleed. Patients goal remains to be in her home under the care of her , SNF/ECF is not an option. Although the patient continues to state she does not wish to return to the hospital, her and her  do not wish to proceed with comfort care services.  admits it was more difficult to care for the patient prior to this admission, however he does not wish to pursue placement. Her focus would be to remain in the home with her  and avoid outpatient visits as well as hospitalizations. She does not wish to pursue any aggressive medical management that would inhibit her from living in her home. We discussed patients overall prognosis and plan of care. Code status changed to reflect discussion. Will continue conversation when endoscopy results are available. 23 -discussed goals of care with patient's . Possible mass found in patient's bowel, continues to undergo work-up. We discussed worst-case scenario of cancer diagnosis.   We reviewed risk versus benefits of possible surgery and/or oncologic
Patient's RUE noted to be ecchymotic, red but blanchable and swollen/weeping. Painful to the touch per patient. Patient and  told this RN they were unsure if arm was like that prior to PICC line placement on 5/22. All 3 lumens flush easily but do not give blood return. Perfect serve sent to [facility] NP to notify. NP to bedside to see patient. No new orders. Patient changed to lab draw d/t no blood return in line.
Pt d/c home with  at 12. IV removed with no complications. Tele removed and [facility] called. D/C paperwork went over with  and pt; all questions answered. Prescriptions picked up from inpatient pharmacy. PCA took pt to main lobby with belongings including; clothing and shoes.  Electronically signed by Audrey Castro RN on 5/30/23 at 3:21 PM EDT
[facility]  Phone: [phone]  Fax: [phone]  [facility]. com       We are following the patient for MARK/CKD   69y.o. year old female with past medical history significant for CAD, HF, AF, HTN, aortic aneurysm s/p AAA repair 2018, COPD, DENISE refuses CPAP, GIB/gastric AVM. Recurrent AF Admitted with LE weakness and SOB   Has CKD with baseline Cr 1.6-1.8 mg   Recently seen by Dr Gavin Walker noted to have increased from 1.6 mg on admission to 2 mg Also BUN elevated to 96 mg   Has been on diuretics per cardiology    SUBJECTIVE:  Over all feels better . No BM for 3 days  Received an enema today    ROS Feels weak/tired , no SOB/LI     mall bowel dieulafoy lesion with active bleeding , S/p clipping     OBJECTIVE:     PHYSICAL:    TEMPERATURE:  Current - Temp: 98.2 °F (36.8 °C);  Max - Temp  Av °F (36.7 °C)  Min: 97.8 °F (36.6 °C)  Max: 98.2 °F (36.8 °C)  RESPIRATIONS RANGE: Resp  Av.8  Min: 19  Max: 25  PULSE RANGE: Pulse  Av.9  Min: 56  Max: 86  BLOOD PRESSURE RANGE:  Systolic (76VKI), BCY:273 , Min:117 , KFM:720   ; Diastolic (22TKL), DZY:29, Min:46, Max:85    PULSE OXIMETRY RANGE: SpO2  Av.8 %  Min: 94 %  Max: 98 %  24HR INTAKE/OUTPUT:    Intake/Output Summary (Last 24 hours) at 2023 1610  Last data filed at 2023 1410  Gross per 24 hour   Intake 600 ml   Output 2450 ml   Net -1850 ml       CONSTITUTIONAL:  awake, alert, cooperative, no apparent distress  HEENT:  Lids and lashes normal, pupils equal, round and reactive to light  NECK:  Supple, symmetrical, trachea midline, no adenopathy  LUNGS:  No increased work of breathing, good air exchange, clear to auscultation bilaterally, no crackles or wheezing  CARDIOVASCULAR:  Normal apical impulse, regular rate and rhythm, normal S1 and S2  ABDOMEN:  No scars, normal bowel sounds, soft, non-distended  NEUROLOGIC:  alert, oriented, normal speech, no focal findings or movement disorder noted  SKIN: no bruising or
[facility]  Phone: [phone]  Fax: [phone]  [facility]. com       We are following the patient for MARK/CKD   69y.o. year old female with past medical history significant for CAD, HF, AF, HTN, aortic aneurysm s/p AAA repair 2018, COPD, DENISE refuses CPAP, GIB/gastric AVM. Recurrent AF Admitted with LE weakness and SOB   Has CKD with baseline Cr 1.6-1.8 mg   Recently seen by Dr Gavin Walker noted to have increased from 1.6 mg on admission to 2 mg Also BUN elevated to 96 mg   Has been on diuretics per cardiology    SUBJECTIVE:    HPI:  Breathing comfortably. No CP.  ROS:  In bed. [address]:  medications reviewed.     OBJECTIVE:     PHYSICAL:    BP (!) 161/76   Pulse 85   Temp 97.5 °F (36.4 °C) (Axillary)   Resp 22   Ht 5' 4\" (1.626 m)   Wt 249 lb 1.9 oz (113 kg)   SpO2 92%   BMI 42.76 kg/m²     24HR INTAKE/OUTPUT:    Intake/Output Summary (Last 24 hours) at 5/30/2023 1134  Last data filed at 5/30/2023 0608  Gross per 24 hour   Intake 780 ml   Output 1301 ml   Net -521 ml         CONSTITUTIONAL:  awake, alert, cooperative, no apparent distress  LUNGS:  No increased work of breathing, good air exchange, clear to auscultation bilaterally, no crackles or wheezing  CARDIOVASCULAR:  Normal apical impulse, regular rate and rhythm, normal S1 and S2  ABDOMEN:  No scars, normal bowel sounds, soft, non-distended  SKIN: no bruising or bleeding  EXTREMITIES: 1+ bilateral pedal edema      Data      CBC:   Recent Labs     05/28/23  0602 05/29/23  0725 05/30/23  0859   WBC 14.0* 14.8* 12.8*   RBC 2.77* 3.27* 3.01*   HGB 8.4* 9.8* 9.2*   HCT 25.1* 29.7* 27.8*    214 205       BMP:    Recent Labs     05/28/23  0602 05/29/23  0725 05/30/23  0600    136 139   K 3.4* 3.7 4.1   CL 93* 94* 98*   CO2 37* 34* 34*   BUN 38* 37* 33*   CREATININE 1.8* 1.7* 1.7*   CALCIUM 7.9* 8.4 8.4   GLUCOSE 108* 91 87       Phosphorus:    Recent Labs     05/28/23  0602 05/29/23  0725   PHOS 3.8 3.5
This RN unable to get sufficient blood return from any of the three lumens of the PICC for morning labs. All three lumens flush easily. RN notified lab to collect this mornings labs peripherally.  Electronically signed by Clem Thapa RN on 5/29/2023 at 6:42 AM
in urine or sputum, leg swelling or rashes from baseline. \" 5/26 Laparoscopic lysis of adhesions and  laparoscopic-assisted push enteroscopy due to Small bowel Dieulafoy lesion causing GI bleed. Response To Previous Treatment: Patient with no complaints from previous session. Family / Caregiver Present: Yes ( in room)  Referring Practitioner: Dr Jake Martinez  Referral Date : 05/22/23  Follows Commands: Within Functional Limits  Subjective  Subjective: pt pleasant and agreeable to working with therapy;  very supportive         Social/Functional History  Social/Functional History  Lives With: Spouse  Type of Home: House  Home Layout: One level  Home Access: Stairs to enter without rails  Entrance Stairs - Number of Steps: 1 small step  Bathroom Shower/Tub:  (walk in tub)  Bathroom Toilet: Standard (comfort height)  Bathroom Equipment: Built-in shower seat, Hand-held shower, Commode (uses bedside commode close to recline)  Bathroom Accessibility: Accessible  Home Equipment: Walker, 4 wheeled, Oxygen, Wheelchair-manual (3-4 L pta. Uses transport  wheelchair inside the home,  assists)  ADL Assistance: Needs assistance (assist from  PRN; pt reports w/c does not fit down b[facility] to [facility] or into )  Homemaking Responsibilities:  (Sign other takes care of homemaking needs.)  Ambulation Assistance: Needs assistance (spouse holds her hands to amb, very little. does not use walker anymore)  Transfer Assistance: Needs assistance  Active : No  Patient's  Info:  usually drives. Occupation: Retired  Additional Comments: sleeps in recliner.   Spouse brings her food to recliner  Vision/Hearing  Vision  Vision: Within Functional Limits  Hearing  H[facility]: [facility]  Hearing Exceptions: Hard of hearing/hearing concerns    Cognition   Orientation  Overall Orientation Status: Within Functional Limits  Cognition  Overall Cognitive Status: WFL     Objective

## 2023-05-30 NOTE — PLAN OF CARE
Problem: Discharge Planning  Goal: Discharge to home or other facility with appropriate resources  Outcome: Progressing     Problem: Pain  Goal: Verbalizes/displays adequate comfort level or baseline comfort level  Outcome: Progressing     Problem: Safety - Adult  Goal: Free from fall injury  Outcome: Progressing     Problem: ABCDS Injury Assessment  Goal: Absence of physical injury  Outcome: Progressing     Problem: Skin/Tissue Integrity  Goal: Absence of new skin breakdown  Description: 1. Monitor for areas of redness and/or skin breakdown  2. Assess vascular access sites hourly  3. Every 4-6 hours minimum:  Change oxygen saturation probe site  4. Every 4-6 hours:  If on nasal continuous positive airway pressure, respiratory therapy assess nares and determine need for appliance change or resting period.   Outcome: Progressing     Problem: Respiratory - Adult  Goal: Achieves optimal ventilation and oxygenation  Outcome: Progressing     Problem: Musculoskeletal - Adult  Goal: Return mobility to safest level of function  Outcome: Progressing  Goal: Return ADL status to a safe level of function  Outcome: Progressing     Problem: Hematologic - Adult  Goal: Maintains hematologic stability  Outcome: Progressing     Problem: Chronic Conditions and Co-morbidities  Goal: Patient's chronic conditions and co-morbidity symptoms are monitored and maintained or improved  Outcome: Progressing     Problem: Nutrition Deficit:  Goal: Optimize nutritional status  Outcome: Progressing     Problem: Cardiovascular - Adult  Goal: Maintains optimal cardiac output and hemodynamic stability  Outcome: Progressing     Problem: Skin/Tissue Integrity - Adult  Goal: Skin integrity remains intact  Outcome: Progressing     Problem: Gastrointestinal - Adult  Goal: Maintains or returns to baseline bowel function  Outcome: Progressing  Goal: Maintains adequate nutritional intake  Outcome: Progressing     Problem: Genitourinary - Adult  Goal:

## 2023-05-30 NOTE — CARE COORDINATION
05/30/23 1416   IMM Letter   IMM Letter given to Patient/Family/Significant other/Guardian/POA/by: Provided to patient at bedside by Court Morgan RN. Education provided to patient, patient reported no questions and verbalized understanding. Patient aware of 4 hours allotted time to determine if they choose to pursue Medicare appeal process.    IMM Letter date given: 05/30/23   IMM Letter time given: [id number]  Electronically signed by Court Morgan RN on 5/30/2023 at 2:17 PM
[facility]    DC order noted, all docs needed have been faxed to [facility] for home care services.     Home care to see patient by 6/1/23    Clay Godinez RN, BSN CTN  [facility] [phone]
Patient and/or family/POA has signed, initialed and placed the date and time on IMM letter #2 on the the appropriate lines. Copy of letter offered and they are aware that the original copy of IMM letter #2 is available prior to discharge from the paper chart on the unit. Electronic documentation has been entered into epic for IMM letter #2 and original paper copy has been added to the paper chart at the nurses station. Additional CM Notes:   Discharge order noted.  at bedside to transport. Patient has portable O2 at bedside. Denied any other needs. The Plan for Transition of Care is related to the following treatment goals of GI bleed [K92.2]  Symptomatic anemia [D64.9]  Gastrointestinal hemorrhage, unspecified gastrointestinal hemorrhage type [K92.2]  Congestive heart failure, unspecified HF chronicity, unspecified heart failure type ([address] [I50.9]    The Patient and/or patient representative Óscar Guillaume and her family were provided with a choice of provider and agrees with the discharge plan Yes    Freedom of choice list was provided with basic dialogue that supports the patient's individualized plan of care/goals and shares the quality data associated with the providers.  Yes    Jamar Harris, RN  [facility]   Case Management Department  Ph: [phone]

## 2023-05-31 ENCOUNTER — CLINICAL DOCUMENTATION ONLY (OUTPATIENT)
Facility: CLINIC | Age: 78
End: 2023-05-31

## 2023-06-01 ENCOUNTER — HOSPITAL ENCOUNTER (EMERGENCY)
Age: 78
Discharge: NURSING, REHAB OR LONG-TERM CARE | End: 2023-06-01
Payer: MEDICARE

## 2023-06-01 VITALS
OXYGEN SATURATION: 100 % | TEMPERATURE: 98.6 F | DIASTOLIC BLOOD PRESSURE: 39 MMHG | HEART RATE: 66 BPM | RESPIRATION RATE: 27 BRPM | WEIGHT: 242.73 LBS | HEIGHT: 64 IN | BODY MASS INDEX: 41.44 KG/M2 | SYSTOLIC BLOOD PRESSURE: 112 MMHG

## 2023-06-01 DIAGNOSIS — Z53.20 REFUSAL OF TREATMENT: ICD-10-CM

## 2023-06-01 DIAGNOSIS — R60.0 BILATERAL LOWER EXTREMITY EDEMA: Primary | ICD-10-CM

## 2023-06-01 DIAGNOSIS — R79.89 ELEVATED BRAIN NATRIURETIC PEPTIDE (BNP) LEVEL: ICD-10-CM

## 2023-06-01 DIAGNOSIS — Z99.81 SUPPLEMENTAL OXYGEN DEPENDENT: ICD-10-CM

## 2023-06-01 LAB
ALBUMIN SERPL-MCNC: 2.7 G/DL (ref 3.4–5)
ALBUMIN/GLOB SERPL: 1.2 {RATIO} (ref 1.1–2.2)
ALP SERPL-CCNC: 49 U/L (ref 40–129)
ALT SERPL-CCNC: 9 U/L (ref 10–40)
ANION GAP SERPL CALCULATED.3IONS-SCNC: 8 MMOL/L (ref 3–16)
AST SERPL-CCNC: 18 U/L (ref 15–37)
BASOPHILS # BLD: 0.1 K/UL (ref 0–0.2)
BASOPHILS NFR BLD: 0.5 %
BILIRUB SERPL-MCNC: 0.5 MG/DL (ref 0–1)
BUN SERPL-MCNC: 30 MG/DL (ref 7–20)
CALCIUM SERPL-MCNC: 8.7 MG/DL (ref 8.3–10.6)
CHLORIDE SERPL-SCNC: 95 MMOL/L (ref 99–110)
CO2 SERPL-SCNC: 30 MMOL/L (ref 21–32)
CREAT SERPL-MCNC: 1.7 MG/DL (ref 0.6–1.2)
DEPRECATED RDW RBC AUTO: 17.6 % (ref 12.4–15.4)
EOSINOPHIL # BLD: 0.6 K/UL (ref 0–0.6)
EOSINOPHIL NFR BLD: 4.1 %
GFR SERPLBLD CREATININE-BSD FMLA CKD-EPI: 31 ML/MIN/{1.73_M2}
GLUCOSE SERPL-MCNC: 89 MG/DL (ref 70–99)
HCT VFR BLD AUTO: 28.4 % (ref 36–48)
HGB BLD-MCNC: 9.3 G/DL (ref 12–16)
LYMPHOCYTES # BLD: 0.6 K/UL (ref 1–5.1)
LYMPHOCYTES NFR BLD: 4.4 %
MCH RBC QN AUTO: 31.2 PG (ref 26–34)
MCHC RBC AUTO-ENTMCNC: 32.7 G/DL (ref 31–36)
MCV RBC AUTO: 95.5 FL (ref 80–100)
MONOCYTES # BLD: 1.1 K/UL (ref 0–1.3)
MONOCYTES NFR BLD: 8.1 %
NEUTROPHILS # BLD: 11.4 K/UL (ref 1.7–7.7)
NEUTROPHILS NFR BLD: 82.9 %
NT-PROBNP SERPL-MCNC: 6490 PG/ML (ref 0–449)
PLATELET # BLD AUTO: 248 K/UL (ref 135–450)
PMV BLD AUTO: 9.1 FL (ref 5–10.5)
POTASSIUM SERPL-SCNC: 4.1 MMOL/L (ref 3.5–5.1)
PROT SERPL-MCNC: 4.9 G/DL (ref 6.4–8.2)
RBC # BLD AUTO: 2.97 M/UL (ref 4–5.2)
SODIUM SERPL-SCNC: 133 MMOL/L (ref 136–145)
WBC # BLD AUTO: 13.7 K/UL (ref 4–11)

## 2023-06-01 PROCEDURE — 80053 COMPREHEN METABOLIC PANEL: CPT

## 2023-06-01 PROCEDURE — 96374 THER/PROPH/DIAG INJ IV PUSH: CPT

## 2023-06-01 PROCEDURE — 85025 COMPLETE CBC W/AUTO DIFF WBC: CPT

## 2023-06-01 PROCEDURE — 6360000002 HC RX W HCPCS

## 2023-06-01 PROCEDURE — 83880 ASSAY OF NATRIURETIC PEPTIDE: CPT

## 2023-06-01 PROCEDURE — 99284 EMERGENCY DEPT VISIT MOD MDM: CPT

## 2023-06-01 RX ORDER — TORSEMIDE 20 MG/1
20 TABLET ORAL 2 TIMES DAILY WITH MEALS
COMMUNITY

## 2023-06-01 RX ORDER — FUROSEMIDE 10 MG/ML
40 INJECTION INTRAMUSCULAR; INTRAVENOUS ONCE
Status: COMPLETED | OUTPATIENT
Start: 2023-06-01 | End: 2023-06-01

## 2023-06-01 RX ADMIN — FUROSEMIDE 40 MG: 10 INJECTION, SOLUTION INTRAMUSCULAR; INTRAVENOUS at 18:04

## 2023-06-01 ASSESSMENT — ENCOUNTER SYMPTOMS
SORE THROAT: 0
VOMITING: 0
DIARRHEA: 0
COUGH: 0
ABDOMINAL PAIN: 0
EYE PAIN: 0
CONSTIPATION: 0
SHORTNESS OF BREATH: 0
NAUSEA: 0
RHINORRHEA: 0
BACK PAIN: 0

## 2023-06-01 ASSESSMENT — PAIN - FUNCTIONAL ASSESSMENT: PAIN_FUNCTIONAL_ASSESSMENT: 0-10

## 2023-06-01 ASSESSMENT — PAIN SCALES - GENERAL: PAINLEVEL_OUTOF10: 6

## 2023-06-01 NOTE — PROGRESS NOTES
Occupational Therapy   Omar Rios    Received orders for OT eval and treat. Per social work, pt does not need OT eval to d/c to SNF. Will sign off.     Electronically signed by Sherif Mccullough OT on 6/1/23 at 3:36 PM EDT

## 2023-06-01 NOTE — ED NOTES
Discharge and education instructions reviewed. Patient verbalized understanding, teach-back successful. Patient denied questions at this time. No acute distress noted. Patient instructed to follow-up as noted - return to emergency department if symptoms worsen. Patient verbalized understanding. Discharged per EDMD with discharge instructions.         Shantel Pennington RN  06/01/23 0811

## 2023-06-01 NOTE — DISCHARGE INSTRUCTIONS
Please take the increased diuretic dose as discussed by your nephrologist.  If you change your mind about admission for the leg swelling and elevated BNP please return to the ED at any time

## 2023-06-01 NOTE — ED NOTES
Family at bedside requesting pt be placed in ECF. No more work up needed per family.       Rolo Flores RN  06/01/23 4694

## 2023-06-01 NOTE — CARE COORDINATION
Case Management Discharge Note          Date / Time of Note: 6/1/2023 4:34 PM                  Patient Name: Leila Raphael   YOB: 1945  Diagnosis: No admission diagnoses are documented for this encounter. Date / Time: 6/1/2023 12:40 PM    Financial:  Payor: MEDICARE / Plan: MEDICARE PART A AND B / Product Type: *No Product type* /      Pharmacy:    Ronak Webb [id number] - [facility], [address] [place] [address] [phone] Marty Guardado [phone]  [address] [place] [address]  Phone: [phone] Fax: [phone]      Assistance purchasing medications?:    Assistance provided by Case Management: None at this time    DISCHARGE Disposition: [facility] ([facility])    Nursing Facility:   Discharging to Facility/ Agency   Name: [facility]  Address:  [address] [place] [address]   Phone:  [phone]  Fax:  [phone]     LOC at discharge: Skilled Nursing  TOMEKA Completed: Yes             NURSING REPORT NUMBER: [phone]               NURSING FAX NUMBER: [phone]    Notification completed in [facility]/PAS?:PASRR completed      Transportation:  Transportation PLAN for discharge: EMS transportation   Mode of Transport: Ambulance stretcher - S  Reason for medical transport: Bed confined: Meets the following criteria 1) unable to get out of bed without assistance or ambulate, 2) unable to safely sit up in a wheelchair, 3) unable to maintain erect seating position in a chair for time needed for transport  Name of [facility] [address] [place] [address]: [facility]  Phone: [phone]  Time of Transport: 7:30pm    Transport form completed: Yes    IMM Completed:   Not Indicated    Additional CM Notes: Patient to go to [place] [address] for Rehab stay, patient and family requested placement. The Plan for Transition of Care is related to the following treatment goals of No admission diagnoses are documented for this encounter.     The Patient

## 2023-06-01 NOTE — CARE COORDINATION
ANAYA consult: Rehab Placement  ANAYA spoke with patient , she is agreeable to rehab placement  1- [facility]  Referrals made to both. No Bed available in [address] [place] [address]. [facility] able to accept patient, without therapy evals due to recent discharge from Hospital.    Discussed with patient and her . They are agreeable to go to [facility].     ANAYA ordered dinner for patient OK'd By NP.

## 2023-06-01 NOTE — PROGRESS NOTES
Physical Therapy  Teresita Trujillo      Received orders for PT eval and treat. Per social work, pt does not need PT eval to d/c to SNF. Will sign off.     Electronically signed by Madaline Ahumada, PT [id number] on 6/1/2023 at 3:29 PM

## 2023-06-01 NOTE — DISCHARGE INSTR - COC
Concerns:     [address] [place] Safety Concerns:308489066}    Impairments/Disabilities:      [address] [facility] Impairments/Disabilities:658459236}    Nutrition Therapy:  Current Nutrition Therapy:   [address] [facility] Diet List:453526449}    Routes of Feeding: {CHP DME Other Feedings:900029644}  Liquids: {Slp liquid thickness:38219}  Daily Fluid Restriction: {CHP DME Yes amt example:184981418}  Last Modified Barium Swallow with Video (Video Swallowing Test): {Done Not Done WBCX:919727669}    Treatments at the Time of Hospital Discharge:   Respiratory Treatments: ***  Oxygen Therapy:  {Therapy; copd oxygen:57620}  Ventilator:    { CC Vent BNII:235764367}    Rehab Therapies: {THERAPEUTIC INTERVENTION:9946957987}  Weight Bearing Status/Restrictions: [address]  Weight Bearin}  Other Medical Equipment (for information only, NOT a DME order):  {EQUIPMENT:043859288}  Other Treatments: ***    Patient's personal belongings (please select all that are sent with patient):  {CHP DME Belongings:407582143}    RN SIGNATURE:  {Esignature:708478802}    CASE MANAGEMENT/SOCIAL WORK SECTION    Inpatient Status Date: N/A    Readmission Risk Assessment Score:  Readmission Risk              Risk of Unplanned Readmission:  0           Discharging to Facility/ Agency   Name: [facility]  Address:  [address] [place] [address]   Phone:  [phone]  Fax:  [phone]       / signature: Electronically signed by SEB Lara on 23 at 4:24 PM EDT    PHYSICIAN SECTION    Prognosis: Good    Condition at Discharge: Stable    Rehab Potential (if transferring to Rehab): Good    Recommended Labs or Other Treatments After Discharge: PT and OT    Physician Certification: I certify the above information and transfer of Wilhelminia Skiff  is necessary for the continuing treatment of the diagnosis listed and that she requires [place] for greater 30 days.      Update Admission

## 2023-06-01 NOTE — ED PROVIDER NOTES
Pt Expectation of Test or Tx.): above       I am the Primary Clinician of Record. FINAL IMPRESSION      1. Bilateral lower extremity edema    2. Elevated brain natriuretic peptide (BNP) level    3. Supplemental oxygen dependent    4. Refusal of treatment          DISPOSITION/PLAN     DISPOSITION Decision To Discharge 06/01/2023 05:39:48 PM      PATIENT REFERRED TO:  Jarrett Karimi  [address] [place] [address]  [address] [place] [address]  [phone]    Call in 1 day        DISCHARGE MEDICATIONS:  Discharge Medication List as of 6/1/2023  7:00 PM          DISCONTINUED MEDICATIONS:  Discharge Medication List as of 6/1/2023  7:00 PM                 (Please note that portions of this note were completed with a voice recognition program.  Efforts were made to edit the dictations but occasionally words are mis-transcribed.)    CHRISTINE Segura CNP (electronically signed)        CHRISTINE Segura CNP  06/01/23 5270

## 2023-06-01 NOTE — CARE COORDINATION
[facility]  Patient is active with [facility] for nurse, PT, OT. Will follow for discharge to home with orders to resume care.     Veronica Whiteside RN, BSN CTN  [facility] [phone]

## 2023-06-08 NOTE — TELEPHONE ENCOUNTER
Requesting husbands Noah Paulino  gallbladder U/S to be faxed to [facility] GI report faxed to [phone] N/A

## 2023-06-21 NOTE — PROGRESS NOTES
@1867 did receive from PACU and report from [facility]. Does have figure 8 to the right groin which is tender to touch. Does have small amount of bloody drainage on the dressing. Does have 2-L nc on. Have 20 G iv to the left forearm which was flushed and saline locked. Was incont of urine in pacu. Did place purwick. Currently on bedrest did place in supine and reverse trend. States pain 5/10 to the right groin and the back. @7131 Dr Violet Mariano at bedside. Would like her to have a full meal and then take the xarelto. Did release orders. Did call for tray. He did go out and speak with .    @9733 handoff report with CABRERA Baldwin I reviewed the note and agree with the resident’s findings and plan as documented in the current encounter.     Eladia Hawkins MD

## 2023-06-25 ENCOUNTER — APPOINTMENT (OUTPATIENT)
Dept: CT IMAGING | Age: 78
DRG: 689 | End: 2023-06-25
Payer: MEDICARE

## 2023-06-25 ENCOUNTER — APPOINTMENT (OUTPATIENT)
Dept: GENERAL RADIOLOGY | Age: 78
DRG: 689 | End: 2023-06-25
Payer: MEDICARE

## 2023-06-25 ENCOUNTER — HOSPITAL ENCOUNTER (INPATIENT)
Age: 78
LOS: 7 days | Discharge: HOME HEALTH | DRG: 689 | End: 2023-07-02
Attending: STUDENT IN AN ORGANIZED HEALTH CARE EDUCATION/TRAINING PROGRAM | Admitting: STUDENT IN AN ORGANIZED HEALTH CARE EDUCATION/TRAINING PROGRAM
Payer: MEDICARE

## 2023-06-25 DIAGNOSIS — N30.00 ACUTE CYSTITIS WITHOUT HEMATURIA: ICD-10-CM

## 2023-06-25 DIAGNOSIS — R41.82 ALTERED MENTAL STATUS, UNSPECIFIED ALTERED MENTAL STATUS TYPE: Primary | ICD-10-CM

## 2023-06-25 DIAGNOSIS — R53.83 LETHARGY: ICD-10-CM

## 2023-06-25 DIAGNOSIS — R53.1 GENERALIZED WEAKNESS: ICD-10-CM

## 2023-06-25 PROBLEM — G93.41 ACUTE METABOLIC ENCEPHALOPATHY: Status: ACTIVE | Noted: 2023-06-25

## 2023-06-25 LAB
ALBUMIN SERPL-MCNC: 3.7 G/DL (ref 3.4–5)
ALBUMIN/GLOB SERPL: 1.8 {RATIO} (ref 1.1–2.2)
ALP SERPL-CCNC: 72 U/L (ref 40–129)
ALT SERPL-CCNC: 9 U/L (ref 10–40)
ANION GAP SERPL CALCULATED.3IONS-SCNC: 7 MMOL/L (ref 3–16)
AST SERPL-CCNC: 13 U/L (ref 15–37)
BACTERIA URNS QL MICRO: ABNORMAL /HPF
BASE EXCESS BLDA CALC-SCNC: 8.3 MMOL/L (ref -3–3)
BASOPHILS # BLD: 0 K/UL (ref 0–0.2)
BASOPHILS NFR BLD: 0.5 %
BILIRUB SERPL-MCNC: 0.5 MG/DL (ref 0–1)
BILIRUB UR QL STRIP.AUTO: NEGATIVE
BUN SERPL-MCNC: 25 MG/DL (ref 7–20)
CALCIUM SERPL-MCNC: 8.5 MG/DL (ref 8.3–10.6)
CHLORIDE SERPL-SCNC: 99 MMOL/L (ref 99–110)
CLARITY UR: CLEAR
CO2 BLDA-SCNC: 36.9 MMOL/L
CO2 SERPL-SCNC: 34 MMOL/L (ref 21–32)
COHGB MFR BLDA: 1.7 % (ref 0–1.5)
COLOR UR: YELLOW
CREAT SERPL-MCNC: 1.7 MG/DL (ref 0.6–1.2)
DEPRECATED RDW RBC AUTO: 17.3 % (ref 12.4–15.4)
EOSINOPHIL # BLD: 0.8 K/UL (ref 0–0.6)
EOSINOPHIL NFR BLD: 7.4 %
EPI CELLS #/AREA URNS AUTO: 0 /HPF (ref 0–5)
GFR SERPLBLD CREATININE-BSD FMLA CKD-EPI: 31 ML/MIN/{1.73_M2}
GLUCOSE SERPL-MCNC: 81 MG/DL (ref 70–99)
GLUCOSE UR STRIP.AUTO-MCNC: NEGATIVE MG/DL
HCO3 BLDA-SCNC: 35 MMOL/L (ref 21–29)
HCT VFR BLD AUTO: 29.9 % (ref 36–48)
HGB BLD-MCNC: 10 G/DL (ref 12–16)
HGB BLDA-MCNC: 10.7 G/DL (ref 12–16)
HGB UR QL STRIP.AUTO: NEGATIVE
HYALINE CASTS #/AREA URNS AUTO: 0 /LPF (ref 0–8)
KETONES UR STRIP.AUTO-MCNC: NEGATIVE MG/DL
LACTATE BLDV-SCNC: 0.8 MMOL/L (ref 0.4–1.9)
LACTATE BLDV-SCNC: 0.9 MMOL/L (ref 0.4–1.9)
LEUKOCYTE ESTERASE UR QL STRIP.AUTO: ABNORMAL
LYMPHOCYTES # BLD: 0.7 K/UL (ref 1–5.1)
LYMPHOCYTES NFR BLD: 6.9 %
MCH RBC QN AUTO: 31.2 PG (ref 26–34)
MCHC RBC AUTO-ENTMCNC: 33.6 G/DL (ref 31–36)
MCV RBC AUTO: 92.8 FL (ref 80–100)
METHGB MFR BLDA: 0.4 %
MONOCYTES # BLD: 0.9 K/UL (ref 0–1.3)
MONOCYTES NFR BLD: 8.8 %
NEUTROPHILS # BLD: 8.1 K/UL (ref 1.7–7.7)
NEUTROPHILS NFR BLD: 76.4 %
NITRITE UR QL STRIP.AUTO: POSITIVE
O2 THERAPY: ABNORMAL
PCO2 BLDA: 59.7 MMHG (ref 35–45)
PH BLDA: 7.38 [PH] (ref 7.35–7.45)
PH UR STRIP.AUTO: 7.5 [PH] (ref 5–8)
PLATELET # BLD AUTO: 203 K/UL (ref 135–450)
PMV BLD AUTO: 9.5 FL (ref 5–10.5)
PO2 BLDA: 115 MMHG (ref 75–108)
POTASSIUM SERPL-SCNC: 3.9 MMOL/L (ref 3.5–5.1)
PROT SERPL-MCNC: 5.8 G/DL (ref 6.4–8.2)
PROT UR STRIP.AUTO-MCNC: 30 MG/DL
RBC # BLD AUTO: 3.22 M/UL (ref 4–5.2)
RBC CLUMPS #/AREA URNS AUTO: 2 /HPF (ref 0–4)
SAO2 % BLDA: 99.2 %
SODIUM SERPL-SCNC: 140 MMOL/L (ref 136–145)
SP GR UR STRIP.AUTO: 1.01 (ref 1–1.03)
TROPONIN, HIGH SENSITIVITY: 87 NG/L (ref 0–14)
TROPONIN, HIGH SENSITIVITY: 92 NG/L (ref 0–14)
UA COMPLETE W REFLEX CULTURE PNL UR: YES
UA DIPSTICK W REFLEX MICRO PNL UR: YES
URN SPEC COLLECT METH UR: ABNORMAL
UROBILINOGEN UR STRIP-ACNC: 1 E.U./DL
WBC # BLD AUTO: 10.5 K/UL (ref 4–11)
WBC #/AREA URNS AUTO: 14 /HPF (ref 0–5)

## 2023-06-25 PROCEDURE — 87086 URINE CULTURE/COLONY COUNT: CPT

## 2023-06-25 PROCEDURE — 1200000000 HC SEMI PRIVATE

## 2023-06-25 PROCEDURE — 87077 CULTURE AEROBIC IDENTIFY: CPT

## 2023-06-25 PROCEDURE — 2580000003 HC RX 258: Performed by: STUDENT IN AN ORGANIZED HEALTH CARE EDUCATION/TRAINING PROGRAM

## 2023-06-25 PROCEDURE — 84484 ASSAY OF TROPONIN QUANT: CPT

## 2023-06-25 PROCEDURE — 96365 THER/PROPH/DIAG IV INF INIT: CPT

## 2023-06-25 PROCEDURE — 94761 N-INVAS EAR/PLS OXIMETRY MLT: CPT

## 2023-06-25 PROCEDURE — 6370000000 HC RX 637 (ALT 250 FOR IP): Performed by: STUDENT IN AN ORGANIZED HEALTH CARE EDUCATION/TRAINING PROGRAM

## 2023-06-25 PROCEDURE — 36415 COLL VENOUS BLD VENIPUNCTURE: CPT

## 2023-06-25 PROCEDURE — 70450 CT HEAD/BRAIN W/O DYE: CPT

## 2023-06-25 PROCEDURE — 80053 COMPREHEN METABOLIC PANEL: CPT

## 2023-06-25 PROCEDURE — 85025 COMPLETE CBC W/AUTO DIFF WBC: CPT

## 2023-06-25 PROCEDURE — 81001 URINALYSIS AUTO W/SCOPE: CPT

## 2023-06-25 PROCEDURE — 83605 ASSAY OF LACTIC ACID: CPT

## 2023-06-25 PROCEDURE — 82803 BLOOD GASES ANY COMBINATION: CPT

## 2023-06-25 PROCEDURE — 87040 BLOOD CULTURE FOR BACTERIA: CPT

## 2023-06-25 PROCEDURE — 93005 ELECTROCARDIOGRAM TRACING: CPT | Performed by: PHYSICIAN ASSISTANT

## 2023-06-25 PROCEDURE — 36600 WITHDRAWAL OF ARTERIAL BLOOD: CPT

## 2023-06-25 PROCEDURE — 6360000002 HC RX W HCPCS: Performed by: PHYSICIAN ASSISTANT

## 2023-06-25 PROCEDURE — 2700000000 HC OXYGEN THERAPY PER DAY

## 2023-06-25 PROCEDURE — 99285 EMERGENCY DEPT VISIT HI MDM: CPT

## 2023-06-25 PROCEDURE — 2580000003 HC RX 258: Performed by: PHYSICIAN ASSISTANT

## 2023-06-25 PROCEDURE — 71046 X-RAY EXAM CHEST 2 VIEWS: CPT

## 2023-06-25 PROCEDURE — 87186 SC STD MICRODIL/AGAR DIL: CPT

## 2023-06-25 RX ORDER — SODIUM CHLORIDE 0.9 % (FLUSH) 0.9 %
5-40 SYRINGE (ML) INJECTION PRN
Status: DISCONTINUED | OUTPATIENT
Start: 2023-06-25 | End: 2023-07-02 | Stop reason: HOSPADM

## 2023-06-25 RX ORDER — DILTIAZEM HYDROCHLORIDE 120 MG/1
120 CAPSULE, COATED, EXTENDED RELEASE ORAL DAILY
Status: DISCONTINUED | OUTPATIENT
Start: 2023-06-26 | End: 2023-07-02 | Stop reason: HOSPADM

## 2023-06-25 RX ORDER — POLYETHYLENE GLYCOL 3350 17 G/17G
17 POWDER, FOR SOLUTION ORAL DAILY PRN
Status: DISCONTINUED | OUTPATIENT
Start: 2023-06-25 | End: 2023-07-02 | Stop reason: HOSPADM

## 2023-06-25 RX ORDER — SODIUM CHLORIDE 0.9 % (FLUSH) 0.9 %
5-40 SYRINGE (ML) INJECTION EVERY 12 HOURS SCHEDULED
Status: DISCONTINUED | OUTPATIENT
Start: 2023-06-25 | End: 2023-07-02 | Stop reason: HOSPADM

## 2023-06-25 RX ORDER — ACETAMINOPHEN 650 MG/1
650 SUPPOSITORY RECTAL EVERY 6 HOURS PRN
Status: DISCONTINUED | OUTPATIENT
Start: 2023-06-25 | End: 2023-07-02 | Stop reason: HOSPADM

## 2023-06-25 RX ORDER — SODIUM CHLORIDE 9 MG/ML
INJECTION, SOLUTION INTRAVENOUS PRN
Status: DISCONTINUED | OUTPATIENT
Start: 2023-06-25 | End: 2023-07-02 | Stop reason: HOSPADM

## 2023-06-25 RX ORDER — ACETAMINOPHEN 325 MG/1
650 TABLET ORAL EVERY 6 HOURS PRN
Status: DISCONTINUED | OUTPATIENT
Start: 2023-06-25 | End: 2023-07-02 | Stop reason: HOSPADM

## 2023-06-25 RX ORDER — ONDANSETRON 4 MG/1
4 TABLET, ORALLY DISINTEGRATING ORAL EVERY 8 HOURS PRN
Status: DISCONTINUED | OUTPATIENT
Start: 2023-06-25 | End: 2023-07-02 | Stop reason: HOSPADM

## 2023-06-25 RX ORDER — ONDANSETRON 2 MG/ML
4 INJECTION INTRAMUSCULAR; INTRAVENOUS EVERY 6 HOURS PRN
Status: DISCONTINUED | OUTPATIENT
Start: 2023-06-25 | End: 2023-07-02 | Stop reason: HOSPADM

## 2023-06-25 RX ORDER — ATORVASTATIN CALCIUM 40 MG/1
40 TABLET, FILM COATED ORAL DAILY
Status: DISCONTINUED | OUTPATIENT
Start: 2023-06-25 | End: 2023-06-25

## 2023-06-25 RX ORDER — ISOSORBIDE MONONITRATE 60 MG/1
120 TABLET, EXTENDED RELEASE ORAL DAILY
Status: DISCONTINUED | OUTPATIENT
Start: 2023-06-26 | End: 2023-07-02 | Stop reason: HOSPADM

## 2023-06-25 RX ORDER — ROSUVASTATIN CALCIUM 20 MG/1
20 TABLET, COATED ORAL DAILY
Status: DISCONTINUED | OUTPATIENT
Start: 2023-06-26 | End: 2023-07-02 | Stop reason: HOSPADM

## 2023-06-25 RX ORDER — 0.9 % SODIUM CHLORIDE 0.9 %
500 INTRAVENOUS SOLUTION INTRAVENOUS ONCE
Status: COMPLETED | OUTPATIENT
Start: 2023-06-25 | End: 2023-06-25

## 2023-06-25 RX ORDER — RANOLAZINE 500 MG/1
500 TABLET, EXTENDED RELEASE ORAL 2 TIMES DAILY
Status: DISCONTINUED | OUTPATIENT
Start: 2023-06-25 | End: 2023-07-02 | Stop reason: HOSPADM

## 2023-06-25 RX ORDER — ALBUTEROL SULFATE 2.5 MG/3ML
2.5 SOLUTION RESPIRATORY (INHALATION) EVERY 4 HOURS PRN
Status: DISCONTINUED | OUTPATIENT
Start: 2023-06-25 | End: 2023-07-02 | Stop reason: HOSPADM

## 2023-06-25 RX ORDER — PANTOPRAZOLE SODIUM 40 MG/1
40 TABLET, DELAYED RELEASE ORAL
Status: DISCONTINUED | OUTPATIENT
Start: 2023-06-26 | End: 2023-07-02 | Stop reason: HOSPADM

## 2023-06-25 RX ORDER — DOFETILIDE 0.25 MG/1
250 CAPSULE ORAL EVERY 12 HOURS SCHEDULED
Status: DISCONTINUED | OUTPATIENT
Start: 2023-06-25 | End: 2023-06-26 | Stop reason: SDUPTHER

## 2023-06-25 RX ORDER — ASCORBIC ACID 500 MG
500 TABLET ORAL DAILY
Status: DISCONTINUED | OUTPATIENT
Start: 2023-06-26 | End: 2023-07-02 | Stop reason: HOSPADM

## 2023-06-25 RX ADMIN — SODIUM CHLORIDE 500 ML: 9 INJECTION, SOLUTION INTRAVENOUS at 15:39

## 2023-06-25 RX ADMIN — CEFTRIAXONE 1000 MG: 1 INJECTION, POWDER, FOR SOLUTION INTRAMUSCULAR; INTRAVENOUS at 15:42

## 2023-06-25 RX ADMIN — DOFETILIDE 250 MCG: 0.25 CAPSULE ORAL at 22:43

## 2023-06-25 RX ADMIN — RANOLAZINE 500 MG: 500 TABLET, FILM COATED, EXTENDED RELEASE ORAL at 22:43

## 2023-06-25 RX ADMIN — SODIUM CHLORIDE, PRESERVATIVE FREE 10 ML: 5 INJECTION INTRAVENOUS at 22:52

## 2023-06-25 ASSESSMENT — PAIN SCALES - GENERAL
PAINLEVEL_OUTOF10: 0
PAINLEVEL_OUTOF10: 0

## 2023-06-25 ASSESSMENT — PAIN - FUNCTIONAL ASSESSMENT: PAIN_FUNCTIONAL_ASSESSMENT: 0-10

## 2023-06-26 LAB
ANION GAP SERPL CALCULATED.3IONS-SCNC: 8 MMOL/L (ref 3–16)
BASOPHILS # BLD: 0 K/UL (ref 0–0.2)
BASOPHILS NFR BLD: 0.4 %
BUN SERPL-MCNC: 22 MG/DL (ref 7–20)
CALCIUM SERPL-MCNC: 8.7 MG/DL (ref 8.3–10.6)
CHLORIDE SERPL-SCNC: 102 MMOL/L (ref 99–110)
CO2 SERPL-SCNC: 33 MMOL/L (ref 21–32)
CREAT SERPL-MCNC: 1.5 MG/DL (ref 0.6–1.2)
DEPRECATED RDW RBC AUTO: 17.1 % (ref 12.4–15.4)
EKG ATRIAL RATE: 138 BPM
EKG DIAGNOSIS: NORMAL
EKG Q-T INTERVAL: 424 MS
EKG QRS DURATION: 82 MS
EKG QTC CALCULATION (BAZETT): 467 MS
EKG R AXIS: -12 DEGREES
EKG T AXIS: -18 DEGREES
EKG VENTRICULAR RATE: 73 BPM
EOSINOPHIL # BLD: 0.7 K/UL (ref 0–0.6)
EOSINOPHIL NFR BLD: 7.7 %
GFR SERPLBLD CREATININE-BSD FMLA CKD-EPI: 36 ML/MIN/{1.73_M2}
GLUCOSE SERPL-MCNC: 94 MG/DL (ref 70–99)
HCT VFR BLD AUTO: 29 % (ref 36–48)
HGB BLD-MCNC: 9.6 G/DL (ref 12–16)
LYMPHOCYTES # BLD: 0.6 K/UL (ref 1–5.1)
LYMPHOCYTES NFR BLD: 6.1 %
MCH RBC QN AUTO: 31.1 PG (ref 26–34)
MCHC RBC AUTO-ENTMCNC: 33.2 G/DL (ref 31–36)
MCV RBC AUTO: 93.8 FL (ref 80–100)
MONOCYTES # BLD: 0.7 K/UL (ref 0–1.3)
MONOCYTES NFR BLD: 7.9 %
NEUTROPHILS # BLD: 7.3 K/UL (ref 1.7–7.7)
NEUTROPHILS NFR BLD: 77.9 %
PLATELET # BLD AUTO: 192 K/UL (ref 135–450)
PMV BLD AUTO: 9.6 FL (ref 5–10.5)
POTASSIUM SERPL-SCNC: 3.9 MMOL/L (ref 3.5–5.1)
RBC # BLD AUTO: 3.09 M/UL (ref 4–5.2)
SODIUM SERPL-SCNC: 143 MMOL/L (ref 136–145)
WBC # BLD AUTO: 9.3 K/UL (ref 4–11)

## 2023-06-26 PROCEDURE — 6370000000 HC RX 637 (ALT 250 FOR IP): Performed by: STUDENT IN AN ORGANIZED HEALTH CARE EDUCATION/TRAINING PROGRAM

## 2023-06-26 PROCEDURE — 97530 THERAPEUTIC ACTIVITIES: CPT

## 2023-06-26 PROCEDURE — 6360000002 HC RX W HCPCS: Performed by: HOSPITALIST

## 2023-06-26 PROCEDURE — 97162 PT EVAL MOD COMPLEX 30 MIN: CPT

## 2023-06-26 PROCEDURE — 2580000003 HC RX 258: Performed by: HOSPITALIST

## 2023-06-26 PROCEDURE — 36415 COLL VENOUS BLD VENIPUNCTURE: CPT

## 2023-06-26 PROCEDURE — 80048 BASIC METABOLIC PNL TOTAL CA: CPT

## 2023-06-26 PROCEDURE — 6360000002 HC RX W HCPCS: Performed by: STUDENT IN AN ORGANIZED HEALTH CARE EDUCATION/TRAINING PROGRAM

## 2023-06-26 PROCEDURE — 94640 AIRWAY INHALATION TREATMENT: CPT

## 2023-06-26 PROCEDURE — 2700000000 HC OXYGEN THERAPY PER DAY

## 2023-06-26 PROCEDURE — 97166 OT EVAL MOD COMPLEX 45 MIN: CPT

## 2023-06-26 PROCEDURE — 85025 COMPLETE CBC W/AUTO DIFF WBC: CPT

## 2023-06-26 PROCEDURE — 1200000000 HC SEMI PRIVATE

## 2023-06-26 PROCEDURE — 2580000003 HC RX 258: Performed by: STUDENT IN AN ORGANIZED HEALTH CARE EDUCATION/TRAINING PROGRAM

## 2023-06-26 PROCEDURE — 94761 N-INVAS EAR/PLS OXIMETRY MLT: CPT

## 2023-06-26 PROCEDURE — 97535 SELF CARE MNGMENT TRAINING: CPT

## 2023-06-26 PROCEDURE — 93010 ELECTROCARDIOGRAM REPORT: CPT | Performed by: INTERNAL MEDICINE

## 2023-06-26 RX ORDER — DOFETILIDE 0.12 MG/1
250 CAPSULE ORAL EVERY 12 HOURS SCHEDULED
Status: DISCONTINUED | OUTPATIENT
Start: 2023-06-26 | End: 2023-07-02 | Stop reason: HOSPADM

## 2023-06-26 RX ADMIN — ISOSORBIDE MONONITRATE 120 MG: 60 TABLET, EXTENDED RELEASE ORAL at 08:47

## 2023-06-26 RX ADMIN — TIOTROPIUM BROMIDE INHALATION SPRAY 2 PUFF: 3.12 SPRAY, METERED RESPIRATORY (INHALATION) at 07:29

## 2023-06-26 RX ADMIN — DILTIAZEM HYDROCHLORIDE 120 MG: 120 CAPSULE, EXTENDED RELEASE ORAL at 08:47

## 2023-06-26 RX ADMIN — ROSUVASTATIN CALCIUM 20 MG: 20 TABLET, FILM COATED ORAL at 08:47

## 2023-06-26 RX ADMIN — SODIUM CHLORIDE, PRESERVATIVE FREE 5 ML: 5 INJECTION INTRAVENOUS at 09:22

## 2023-06-26 RX ADMIN — RIVAROXABAN 15 MG: 15 TABLET, FILM COATED ORAL at 08:48

## 2023-06-26 RX ADMIN — OXYCODONE HYDROCHLORIDE AND ACETAMINOPHEN 500 MG: 500 TABLET ORAL at 08:48

## 2023-06-26 RX ADMIN — PANTOPRAZOLE SODIUM 40 MG: 40 TABLET, DELAYED RELEASE ORAL at 06:16

## 2023-06-26 RX ADMIN — MOMETASONE FUROATE AND FORMOTEROL FUMARATE DIHYDRATE 2 PUFF: 200; 5 AEROSOL RESPIRATORY (INHALATION) at 07:29

## 2023-06-26 RX ADMIN — DOFETILIDE 250 MCG: 0.12 CAPSULE ORAL at 08:48

## 2023-06-26 RX ADMIN — SODIUM CHLORIDE, PRESERVATIVE FREE 10 ML: 5 INJECTION INTRAVENOUS at 20:53

## 2023-06-26 RX ADMIN — AMPICILLIN SODIUM AND SULBACTAM SODIUM 3000 MG: 2; 1 INJECTION, POWDER, FOR SOLUTION INTRAMUSCULAR; INTRAVENOUS at 20:58

## 2023-06-26 RX ADMIN — RANOLAZINE 500 MG: 500 TABLET, FILM COATED, EXTENDED RELEASE ORAL at 08:47

## 2023-06-26 RX ADMIN — DOFETILIDE 250 MCG: 0.12 CAPSULE ORAL at 20:51

## 2023-06-26 RX ADMIN — ALBUTEROL SULFATE 2.5 MG: 2.5 SOLUTION RESPIRATORY (INHALATION) at 07:29

## 2023-06-26 RX ADMIN — ALBUTEROL SULFATE 2.5 MG: 2.5 SOLUTION RESPIRATORY (INHALATION) at 20:03

## 2023-06-26 RX ADMIN — RANOLAZINE 500 MG: 500 TABLET, FILM COATED, EXTENDED RELEASE ORAL at 20:51

## 2023-06-26 RX ADMIN — MOMETASONE FUROATE AND FORMOTEROL FUMARATE DIHYDRATE 2 PUFF: 200; 5 AEROSOL RESPIRATORY (INHALATION) at 20:07

## 2023-06-26 RX ADMIN — AMPICILLIN SODIUM AND SULBACTAM SODIUM 3000 MG: 2; 1 INJECTION, POWDER, FOR SOLUTION INTRAMUSCULAR; INTRAVENOUS at 14:22

## 2023-06-26 ASSESSMENT — PAIN SCALES - WONG BAKER
WONGBAKER_NUMERICALRESPONSE: 0

## 2023-06-26 ASSESSMENT — PAIN SCALES - GENERAL: PAINLEVEL_OUTOF10: 0

## 2023-06-26 ASSESSMENT — PULMONARY FUNCTION TESTS: PEFR_L/MIN: 0

## 2023-06-27 LAB — NT-PROBNP SERPL-MCNC: ABNORMAL PG/ML (ref 0–449)

## 2023-06-27 PROCEDURE — 94761 N-INVAS EAR/PLS OXIMETRY MLT: CPT

## 2023-06-27 PROCEDURE — 97530 THERAPEUTIC ACTIVITIES: CPT

## 2023-06-27 PROCEDURE — 6360000002 HC RX W HCPCS: Performed by: STUDENT IN AN ORGANIZED HEALTH CARE EDUCATION/TRAINING PROGRAM

## 2023-06-27 PROCEDURE — 1200000000 HC SEMI PRIVATE

## 2023-06-27 PROCEDURE — 83880 ASSAY OF NATRIURETIC PEPTIDE: CPT

## 2023-06-27 PROCEDURE — 2580000003 HC RX 258: Performed by: STUDENT IN AN ORGANIZED HEALTH CARE EDUCATION/TRAINING PROGRAM

## 2023-06-27 PROCEDURE — 99223 1ST HOSP IP/OBS HIGH 75: CPT | Performed by: INTERNAL MEDICINE

## 2023-06-27 PROCEDURE — 2700000000 HC OXYGEN THERAPY PER DAY

## 2023-06-27 PROCEDURE — 6360000002 HC RX W HCPCS: Performed by: HOSPITALIST

## 2023-06-27 PROCEDURE — 36415 COLL VENOUS BLD VENIPUNCTURE: CPT

## 2023-06-27 PROCEDURE — 6360000002 HC RX W HCPCS: Performed by: INTERNAL MEDICINE

## 2023-06-27 PROCEDURE — 2580000003 HC RX 258: Performed by: HOSPITALIST

## 2023-06-27 PROCEDURE — 6370000000 HC RX 637 (ALT 250 FOR IP): Performed by: HOSPITALIST

## 2023-06-27 PROCEDURE — 6360000002 HC RX W HCPCS: Performed by: FAMILY MEDICINE

## 2023-06-27 PROCEDURE — 6370000000 HC RX 637 (ALT 250 FOR IP): Performed by: STUDENT IN AN ORGANIZED HEALTH CARE EDUCATION/TRAINING PROGRAM

## 2023-06-27 PROCEDURE — 94640 AIRWAY INHALATION TREATMENT: CPT

## 2023-06-27 RX ORDER — ACETAMINOPHEN 325 MG/1
650 TABLET ORAL EVERY 6 HOURS PRN
COMMUNITY

## 2023-06-27 RX ORDER — FUROSEMIDE 10 MG/ML
40 INJECTION INTRAMUSCULAR; INTRAVENOUS 2 TIMES DAILY
Status: DISCONTINUED | OUTPATIENT
Start: 2023-06-27 | End: 2023-06-27

## 2023-06-27 RX ORDER — FUROSEMIDE 10 MG/ML
60 INJECTION INTRAMUSCULAR; INTRAVENOUS 2 TIMES DAILY
Status: DISCONTINUED | OUTPATIENT
Start: 2023-06-27 | End: 2023-06-29

## 2023-06-27 RX ADMIN — ISOSORBIDE MONONITRATE 120 MG: 60 TABLET, EXTENDED RELEASE ORAL at 08:43

## 2023-06-27 RX ADMIN — AMPICILLIN SODIUM AND SULBACTAM SODIUM 3000 MG: 2; 1 INJECTION, POWDER, FOR SOLUTION INTRAMUSCULAR; INTRAVENOUS at 05:59

## 2023-06-27 RX ADMIN — AMPICILLIN SODIUM AND SULBACTAM SODIUM 3000 MG: 2; 1 INJECTION, POWDER, FOR SOLUTION INTRAMUSCULAR; INTRAVENOUS at 17:30

## 2023-06-27 RX ADMIN — MOMETASONE FUROATE AND FORMOTEROL FUMARATE DIHYDRATE 2 PUFF: 200; 5 AEROSOL RESPIRATORY (INHALATION) at 07:31

## 2023-06-27 RX ADMIN — RIVAROXABAN 15 MG: 15 TABLET, FILM COATED ORAL at 08:44

## 2023-06-27 RX ADMIN — ALBUTEROL SULFATE 2.5 MG: 2.5 SOLUTION RESPIRATORY (INHALATION) at 07:31

## 2023-06-27 RX ADMIN — AMPICILLIN SODIUM AND SULBACTAM SODIUM 3000 MG: 2; 1 INJECTION, POWDER, FOR SOLUTION INTRAMUSCULAR; INTRAVENOUS at 22:07

## 2023-06-27 RX ADMIN — SODIUM CHLORIDE, PRESERVATIVE FREE 10 ML: 5 INJECTION INTRAVENOUS at 08:42

## 2023-06-27 RX ADMIN — TIOTROPIUM BROMIDE INHALATION SPRAY 2 PUFF: 3.12 SPRAY, METERED RESPIRATORY (INHALATION) at 07:31

## 2023-06-27 RX ADMIN — DILTIAZEM HYDROCHLORIDE 120 MG: 120 CAPSULE, EXTENDED RELEASE ORAL at 08:43

## 2023-06-27 RX ADMIN — DOFETILIDE 250 MCG: 0.12 CAPSULE ORAL at 08:43

## 2023-06-27 RX ADMIN — RANOLAZINE 500 MG: 500 TABLET, FILM COATED, EXTENDED RELEASE ORAL at 08:43

## 2023-06-27 RX ADMIN — PANTOPRAZOLE SODIUM 40 MG: 40 TABLET, DELAYED RELEASE ORAL at 05:52

## 2023-06-27 RX ADMIN — FUROSEMIDE 40 MG: 10 INJECTION, SOLUTION INTRAMUSCULAR; INTRAVENOUS at 12:16

## 2023-06-27 RX ADMIN — MICONAZOLE NITRATE: 20 CREAM TOPICAL at 05:53

## 2023-06-27 RX ADMIN — FUROSEMIDE 60 MG: 10 INJECTION, SOLUTION INTRAMUSCULAR; INTRAVENOUS at 17:31

## 2023-06-27 RX ADMIN — AMPICILLIN SODIUM AND SULBACTAM SODIUM 3000 MG: 2; 1 INJECTION, POWDER, FOR SOLUTION INTRAMUSCULAR; INTRAVENOUS at 12:10

## 2023-06-27 RX ADMIN — OXYCODONE HYDROCHLORIDE AND ACETAMINOPHEN 500 MG: 500 TABLET ORAL at 08:44

## 2023-06-27 RX ADMIN — DOFETILIDE 250 MCG: 0.12 CAPSULE ORAL at 22:05

## 2023-06-27 RX ADMIN — RANOLAZINE 500 MG: 500 TABLET, FILM COATED, EXTENDED RELEASE ORAL at 22:05

## 2023-06-27 RX ADMIN — ROSUVASTATIN CALCIUM 20 MG: 20 TABLET, FILM COATED ORAL at 08:43

## 2023-06-27 RX ADMIN — MOMETASONE FUROATE AND FORMOTEROL FUMARATE DIHYDRATE 2 PUFF: 200; 5 AEROSOL RESPIRATORY (INHALATION) at 20:37

## 2023-06-27 ASSESSMENT — PAIN SCALES - WONG BAKER
WONGBAKER_NUMERICALRESPONSE: 0

## 2023-06-28 LAB
ANION GAP SERPL CALCULATED.3IONS-SCNC: 10 MMOL/L (ref 3–16)
BACTERIA UR CULT: ABNORMAL
BASOPHILS # BLD: 0 K/UL (ref 0–0.2)
BASOPHILS NFR BLD: 0.4 %
BUN SERPL-MCNC: 19 MG/DL (ref 7–20)
CALCIUM SERPL-MCNC: 8.7 MG/DL (ref 8.3–10.6)
CHLORIDE SERPL-SCNC: 98 MMOL/L (ref 99–110)
CO2 SERPL-SCNC: 36 MMOL/L (ref 21–32)
CREAT SERPL-MCNC: 1.7 MG/DL (ref 0.6–1.2)
DEPRECATED RDW RBC AUTO: 16.9 % (ref 12.4–15.4)
EKG ATRIAL RATE: 52 BPM
EKG DIAGNOSIS: NORMAL
EKG P AXIS: 67 DEGREES
EKG P-R INTERVAL: 210 MS
EKG Q-T INTERVAL: 482 MS
EKG QRS DURATION: 78 MS
EKG QTC CALCULATION (BAZETT): 448 MS
EKG R AXIS: -7 DEGREES
EKG T AXIS: 57 DEGREES
EKG VENTRICULAR RATE: 52 BPM
EOSINOPHIL # BLD: 0.3 K/UL (ref 0–0.6)
EOSINOPHIL NFR BLD: 3.2 %
GFR SERPLBLD CREATININE-BSD FMLA CKD-EPI: 31 ML/MIN/{1.73_M2}
GLUCOSE SERPL-MCNC: 109 MG/DL (ref 70–99)
HCT VFR BLD AUTO: 30.9 % (ref 36–48)
HGB BLD-MCNC: 10.1 G/DL (ref 12–16)
LYMPHOCYTES # BLD: 0.5 K/UL (ref 1–5.1)
LYMPHOCYTES NFR BLD: 4.9 %
MCH RBC QN AUTO: 30.7 PG (ref 26–34)
MCHC RBC AUTO-ENTMCNC: 32.5 G/DL (ref 31–36)
MCV RBC AUTO: 94.5 FL (ref 80–100)
MONOCYTES # BLD: 0.8 K/UL (ref 0–1.3)
MONOCYTES NFR BLD: 7.7 %
NEUTROPHILS # BLD: 9.1 K/UL (ref 1.7–7.7)
NEUTROPHILS NFR BLD: 83.8 %
NT-PROBNP SERPL-MCNC: ABNORMAL PG/ML (ref 0–449)
ORGANISM: ABNORMAL
PLATELET # BLD AUTO: 161 K/UL (ref 135–450)
PMV BLD AUTO: 10.2 FL (ref 5–10.5)
POTASSIUM SERPL-SCNC: 3.8 MMOL/L (ref 3.5–5.1)
RBC # BLD AUTO: 3.27 M/UL (ref 4–5.2)
SODIUM SERPL-SCNC: 144 MMOL/L (ref 136–145)
WBC # BLD AUTO: 10.8 K/UL (ref 4–11)

## 2023-06-28 PROCEDURE — 99232 SBSQ HOSP IP/OBS MODERATE 35: CPT | Performed by: NURSE PRACTITIONER

## 2023-06-28 PROCEDURE — 97116 GAIT TRAINING THERAPY: CPT | Performed by: PHYSICAL THERAPIST

## 2023-06-28 PROCEDURE — 97535 SELF CARE MNGMENT TRAINING: CPT

## 2023-06-28 PROCEDURE — 6360000002 HC RX W HCPCS: Performed by: INTERNAL MEDICINE

## 2023-06-28 PROCEDURE — 1200000000 HC SEMI PRIVATE

## 2023-06-28 PROCEDURE — 83880 ASSAY OF NATRIURETIC PEPTIDE: CPT

## 2023-06-28 PROCEDURE — 94760 N-INVAS EAR/PLS OXIMETRY 1: CPT

## 2023-06-28 PROCEDURE — 85025 COMPLETE CBC W/AUTO DIFF WBC: CPT

## 2023-06-28 PROCEDURE — 36415 COLL VENOUS BLD VENIPUNCTURE: CPT

## 2023-06-28 PROCEDURE — 6360000002 HC RX W HCPCS: Performed by: HOSPITALIST

## 2023-06-28 PROCEDURE — 6360000002 HC RX W HCPCS: Performed by: STUDENT IN AN ORGANIZED HEALTH CARE EDUCATION/TRAINING PROGRAM

## 2023-06-28 PROCEDURE — 97530 THERAPEUTIC ACTIVITIES: CPT

## 2023-06-28 PROCEDURE — 94640 AIRWAY INHALATION TREATMENT: CPT

## 2023-06-28 PROCEDURE — 97110 THERAPEUTIC EXERCISES: CPT | Performed by: PHYSICAL THERAPIST

## 2023-06-28 PROCEDURE — 2580000003 HC RX 258: Performed by: STUDENT IN AN ORGANIZED HEALTH CARE EDUCATION/TRAINING PROGRAM

## 2023-06-28 PROCEDURE — 6370000000 HC RX 637 (ALT 250 FOR IP): Performed by: STUDENT IN AN ORGANIZED HEALTH CARE EDUCATION/TRAINING PROGRAM

## 2023-06-28 PROCEDURE — 6370000000 HC RX 637 (ALT 250 FOR IP): Performed by: NURSE PRACTITIONER

## 2023-06-28 PROCEDURE — 2580000003 HC RX 258: Performed by: HOSPITALIST

## 2023-06-28 PROCEDURE — 97530 THERAPEUTIC ACTIVITIES: CPT | Performed by: PHYSICAL THERAPIST

## 2023-06-28 PROCEDURE — 80048 BASIC METABOLIC PNL TOTAL CA: CPT

## 2023-06-28 PROCEDURE — 2700000000 HC OXYGEN THERAPY PER DAY

## 2023-06-28 RX ORDER — HYDRALAZINE HYDROCHLORIDE 25 MG/1
25 TABLET, FILM COATED ORAL EVERY 8 HOURS SCHEDULED
Status: DISCONTINUED | OUTPATIENT
Start: 2023-06-28 | End: 2023-06-29

## 2023-06-28 RX ADMIN — AMPICILLIN SODIUM AND SULBACTAM SODIUM 3000 MG: 2; 1 INJECTION, POWDER, FOR SOLUTION INTRAMUSCULAR; INTRAVENOUS at 12:36

## 2023-06-28 RX ADMIN — FUROSEMIDE 60 MG: 10 INJECTION, SOLUTION INTRAMUSCULAR; INTRAVENOUS at 08:22

## 2023-06-28 RX ADMIN — MOMETASONE FUROATE AND FORMOTEROL FUMARATE DIHYDRATE 2 PUFF: 200; 5 AEROSOL RESPIRATORY (INHALATION) at 08:10

## 2023-06-28 RX ADMIN — TIOTROPIUM BROMIDE INHALATION SPRAY 2 PUFF: 3.12 SPRAY, METERED RESPIRATORY (INHALATION) at 08:10

## 2023-06-28 RX ADMIN — ROSUVASTATIN CALCIUM 20 MG: 20 TABLET, FILM COATED ORAL at 08:21

## 2023-06-28 RX ADMIN — ISOSORBIDE MONONITRATE 120 MG: 60 TABLET, EXTENDED RELEASE ORAL at 08:21

## 2023-06-28 RX ADMIN — RANOLAZINE 500 MG: 500 TABLET, FILM COATED, EXTENDED RELEASE ORAL at 21:12

## 2023-06-28 RX ADMIN — ALBUTEROL SULFATE 2.5 MG: 2.5 SOLUTION RESPIRATORY (INHALATION) at 15:26

## 2023-06-28 RX ADMIN — DOFETILIDE 250 MCG: 0.12 CAPSULE ORAL at 10:01

## 2023-06-28 RX ADMIN — RANOLAZINE 500 MG: 500 TABLET, FILM COATED, EXTENDED RELEASE ORAL at 08:21

## 2023-06-28 RX ADMIN — DILTIAZEM HYDROCHLORIDE 120 MG: 120 CAPSULE, EXTENDED RELEASE ORAL at 08:21

## 2023-06-28 RX ADMIN — MOMETASONE FUROATE AND FORMOTEROL FUMARATE DIHYDRATE 2 PUFF: 200; 5 AEROSOL RESPIRATORY (INHALATION) at 19:41

## 2023-06-28 RX ADMIN — SODIUM CHLORIDE, PRESERVATIVE FREE 10 ML: 5 INJECTION INTRAVENOUS at 08:22

## 2023-06-28 RX ADMIN — DOFETILIDE 250 MCG: 0.12 CAPSULE ORAL at 21:23

## 2023-06-28 RX ADMIN — HYDRALAZINE HYDROCHLORIDE 25 MG: 25 TABLET, FILM COATED ORAL at 21:12

## 2023-06-28 RX ADMIN — AMPICILLIN SODIUM AND SULBACTAM SODIUM 3000 MG: 2; 1 INJECTION, POWDER, FOR SOLUTION INTRAMUSCULAR; INTRAVENOUS at 18:28

## 2023-06-28 RX ADMIN — AMPICILLIN SODIUM AND SULBACTAM SODIUM 3000 MG: 2; 1 INJECTION, POWDER, FOR SOLUTION INTRAMUSCULAR; INTRAVENOUS at 05:49

## 2023-06-28 RX ADMIN — HYDRALAZINE HYDROCHLORIDE 25 MG: 25 TABLET, FILM COATED ORAL at 16:00

## 2023-06-28 RX ADMIN — RIVAROXABAN 15 MG: 15 TABLET, FILM COATED ORAL at 08:22

## 2023-06-28 RX ADMIN — FUROSEMIDE 60 MG: 10 INJECTION, SOLUTION INTRAMUSCULAR; INTRAVENOUS at 18:28

## 2023-06-28 RX ADMIN — OXYCODONE HYDROCHLORIDE AND ACETAMINOPHEN 500 MG: 500 TABLET ORAL at 08:21

## 2023-06-28 ASSESSMENT — PAIN SCALES - WONG BAKER
WONGBAKER_NUMERICALRESPONSE: 0

## 2023-06-29 LAB
ANION GAP SERPL CALCULATED.3IONS-SCNC: 10 MMOL/L (ref 3–16)
ANISOCYTOSIS BLD QL SMEAR: ABNORMAL
BACTERIA BLD CULT ORG #2: NORMAL
BACTERIA BLD CULT: NORMAL
BASOPHILS # BLD: 0 K/UL (ref 0–0.2)
BASOPHILS NFR BLD: 0.2 %
BUN SERPL-MCNC: 18 MG/DL (ref 7–20)
CALCIUM SERPL-MCNC: 8.6 MG/DL (ref 8.3–10.6)
CHLORIDE SERPL-SCNC: 95 MMOL/L (ref 99–110)
CO2 SERPL-SCNC: 34 MMOL/L (ref 21–32)
CREAT SERPL-MCNC: 1.5 MG/DL (ref 0.6–1.2)
DACRYOCYTES BLD QL SMEAR: ABNORMAL
DEPRECATED RDW RBC AUTO: 17.1 % (ref 12.4–15.4)
EOSINOPHIL # BLD: 0.5 K/UL (ref 0–0.6)
EOSINOPHIL NFR BLD: 4.3 %
GFR SERPLBLD CREATININE-BSD FMLA CKD-EPI: 36 ML/MIN/{1.73_M2}
GLUCOSE SERPL-MCNC: 108 MG/DL (ref 70–99)
HCT VFR BLD AUTO: 30.5 % (ref 36–48)
HGB BLD-MCNC: 10.1 G/DL (ref 12–16)
LYMPHOCYTES # BLD: 0.5 K/UL (ref 1–5.1)
LYMPHOCYTES NFR BLD: 4.9 %
MACROCYTES BLD QL SMEAR: ABNORMAL
MAGNESIUM SERPL-MCNC: 1.9 MG/DL (ref 1.8–2.4)
MCH RBC QN AUTO: 31.2 PG (ref 26–34)
MCHC RBC AUTO-ENTMCNC: 33.1 G/DL (ref 31–36)
MCV RBC AUTO: 94.5 FL (ref 80–100)
MONOCYTES # BLD: 0.8 K/UL (ref 0–1.3)
MONOCYTES NFR BLD: 7.3 %
NEUTROPHILS # BLD: 8.7 K/UL (ref 1.7–7.7)
NEUTROPHILS NFR BLD: 83.3 %
NT-PROBNP SERPL-MCNC: ABNORMAL PG/ML (ref 0–449)
OVALOCYTES BLD QL SMEAR: ABNORMAL
PLATELET # BLD AUTO: 145 K/UL (ref 135–450)
PLATELET BLD QL SMEAR: ADEQUATE
PMV BLD AUTO: 9.8 FL (ref 5–10.5)
POIKILOCYTOSIS BLD QL SMEAR: ABNORMAL
POTASSIUM SERPL-SCNC: 3.4 MMOL/L (ref 3.5–5.1)
RBC # BLD AUTO: 3.23 M/UL (ref 4–5.2)
SLIDE REVIEW: ABNORMAL
SODIUM SERPL-SCNC: 139 MMOL/L (ref 136–145)
WBC # BLD AUTO: 10.5 K/UL (ref 4–11)

## 2023-06-29 PROCEDURE — 6370000000 HC RX 637 (ALT 250 FOR IP): Performed by: NURSE PRACTITIONER

## 2023-06-29 PROCEDURE — 83880 ASSAY OF NATRIURETIC PEPTIDE: CPT

## 2023-06-29 PROCEDURE — 83735 ASSAY OF MAGNESIUM: CPT

## 2023-06-29 PROCEDURE — 2700000000 HC OXYGEN THERAPY PER DAY

## 2023-06-29 PROCEDURE — 97535 SELF CARE MNGMENT TRAINING: CPT

## 2023-06-29 PROCEDURE — 2580000003 HC RX 258: Performed by: STUDENT IN AN ORGANIZED HEALTH CARE EDUCATION/TRAINING PROGRAM

## 2023-06-29 PROCEDURE — 97530 THERAPEUTIC ACTIVITIES: CPT

## 2023-06-29 PROCEDURE — 6370000000 HC RX 637 (ALT 250 FOR IP): Performed by: STUDENT IN AN ORGANIZED HEALTH CARE EDUCATION/TRAINING PROGRAM

## 2023-06-29 PROCEDURE — 99232 SBSQ HOSP IP/OBS MODERATE 35: CPT | Performed by: NURSE PRACTITIONER

## 2023-06-29 PROCEDURE — 80048 BASIC METABOLIC PNL TOTAL CA: CPT

## 2023-06-29 PROCEDURE — 94640 AIRWAY INHALATION TREATMENT: CPT

## 2023-06-29 PROCEDURE — 97116 GAIT TRAINING THERAPY: CPT | Performed by: PHYSICAL THERAPIST

## 2023-06-29 PROCEDURE — 85025 COMPLETE CBC W/AUTO DIFF WBC: CPT

## 2023-06-29 PROCEDURE — 6360000002 HC RX W HCPCS: Performed by: HOSPITALIST

## 2023-06-29 PROCEDURE — 97530 THERAPEUTIC ACTIVITIES: CPT | Performed by: PHYSICAL THERAPIST

## 2023-06-29 PROCEDURE — 94760 N-INVAS EAR/PLS OXIMETRY 1: CPT

## 2023-06-29 PROCEDURE — 6360000002 HC RX W HCPCS: Performed by: NURSE PRACTITIONER

## 2023-06-29 PROCEDURE — 2580000003 HC RX 258: Performed by: HOSPITALIST

## 2023-06-29 PROCEDURE — 36415 COLL VENOUS BLD VENIPUNCTURE: CPT

## 2023-06-29 PROCEDURE — 6360000002 HC RX W HCPCS: Performed by: INTERNAL MEDICINE

## 2023-06-29 PROCEDURE — 1200000000 HC SEMI PRIVATE

## 2023-06-29 RX ORDER — FUROSEMIDE 10 MG/ML
60 INJECTION INTRAMUSCULAR; INTRAVENOUS 3 TIMES DAILY
Status: DISCONTINUED | OUTPATIENT
Start: 2023-06-29 | End: 2023-07-02 | Stop reason: HOSPADM

## 2023-06-29 RX ORDER — HYDRALAZINE HYDROCHLORIDE 50 MG/1
50 TABLET, FILM COATED ORAL EVERY 8 HOURS SCHEDULED
Status: DISCONTINUED | OUTPATIENT
Start: 2023-06-29 | End: 2023-07-02 | Stop reason: HOSPADM

## 2023-06-29 RX ADMIN — DOFETILIDE 250 MCG: 0.12 CAPSULE ORAL at 20:55

## 2023-06-29 RX ADMIN — HYDRALAZINE HYDROCHLORIDE 50 MG: 50 TABLET, FILM COATED ORAL at 15:01

## 2023-06-29 RX ADMIN — RANOLAZINE 500 MG: 500 TABLET, FILM COATED, EXTENDED RELEASE ORAL at 09:49

## 2023-06-29 RX ADMIN — FUROSEMIDE 60 MG: 10 INJECTION, SOLUTION INTRAMUSCULAR; INTRAVENOUS at 09:50

## 2023-06-29 RX ADMIN — SODIUM CHLORIDE, PRESERVATIVE FREE 10 ML: 5 INJECTION INTRAVENOUS at 09:50

## 2023-06-29 RX ADMIN — DILTIAZEM HYDROCHLORIDE 120 MG: 120 CAPSULE, EXTENDED RELEASE ORAL at 09:50

## 2023-06-29 RX ADMIN — DOFETILIDE 250 MCG: 0.12 CAPSULE ORAL at 10:02

## 2023-06-29 RX ADMIN — AMPICILLIN SODIUM AND SULBACTAM SODIUM 3000 MG: 2; 1 INJECTION, POWDER, FOR SOLUTION INTRAMUSCULAR; INTRAVENOUS at 05:49

## 2023-06-29 RX ADMIN — MOMETASONE FUROATE AND FORMOTEROL FUMARATE DIHYDRATE 2 PUFF: 200; 5 AEROSOL RESPIRATORY (INHALATION) at 20:02

## 2023-06-29 RX ADMIN — ROSUVASTATIN CALCIUM 20 MG: 20 TABLET, FILM COATED ORAL at 09:49

## 2023-06-29 RX ADMIN — TIOTROPIUM BROMIDE INHALATION SPRAY 2 PUFF: 3.12 SPRAY, METERED RESPIRATORY (INHALATION) at 09:10

## 2023-06-29 RX ADMIN — OXYCODONE HYDROCHLORIDE AND ACETAMINOPHEN 500 MG: 500 TABLET ORAL at 09:49

## 2023-06-29 RX ADMIN — HYDRALAZINE HYDROCHLORIDE 25 MG: 25 TABLET, FILM COATED ORAL at 05:48

## 2023-06-29 RX ADMIN — FUROSEMIDE 60 MG: 10 INJECTION, SOLUTION INTRAMUSCULAR; INTRAVENOUS at 15:01

## 2023-06-29 RX ADMIN — RANOLAZINE 500 MG: 500 TABLET, FILM COATED, EXTENDED RELEASE ORAL at 20:55

## 2023-06-29 RX ADMIN — ISOSORBIDE MONONITRATE 120 MG: 60 TABLET, EXTENDED RELEASE ORAL at 09:49

## 2023-06-29 RX ADMIN — FUROSEMIDE 60 MG: 10 INJECTION, SOLUTION INTRAMUSCULAR; INTRAVENOUS at 20:55

## 2023-06-29 RX ADMIN — RIVAROXABAN 15 MG: 15 TABLET, FILM COATED ORAL at 09:50

## 2023-06-29 RX ADMIN — AMPICILLIN SODIUM AND SULBACTAM SODIUM 3000 MG: 2; 1 INJECTION, POWDER, FOR SOLUTION INTRAMUSCULAR; INTRAVENOUS at 00:37

## 2023-06-29 RX ADMIN — MOMETASONE FUROATE AND FORMOTEROL FUMARATE DIHYDRATE 2 PUFF: 200; 5 AEROSOL RESPIRATORY (INHALATION) at 09:10

## 2023-06-29 RX ADMIN — PANTOPRAZOLE SODIUM 40 MG: 40 TABLET, DELAYED RELEASE ORAL at 05:48

## 2023-06-29 ASSESSMENT — PAIN SCALES - WONG BAKER
WONGBAKER_NUMERICALRESPONSE: 0

## 2023-06-30 LAB
ANION GAP SERPL CALCULATED.3IONS-SCNC: 14 MMOL/L (ref 3–16)
BASOPHILS # BLD: 0 K/UL (ref 0–0.2)
BASOPHILS NFR BLD: 0.4 %
BUN SERPL-MCNC: 18 MG/DL (ref 7–20)
CALCIUM SERPL-MCNC: 8.8 MG/DL (ref 8.3–10.6)
CHLORIDE SERPL-SCNC: 92 MMOL/L (ref 99–110)
CO2 SERPL-SCNC: 32 MMOL/L (ref 21–32)
CREAT SERPL-MCNC: 1.4 MG/DL (ref 0.6–1.2)
DEPRECATED RDW RBC AUTO: 16.7 % (ref 12.4–15.4)
EOSINOPHIL # BLD: 0.5 K/UL (ref 0–0.6)
EOSINOPHIL NFR BLD: 5.5 %
GFR SERPLBLD CREATININE-BSD FMLA CKD-EPI: 39 ML/MIN/{1.73_M2}
GLUCOSE BLD-MCNC: 116 MG/DL (ref 70–99)
GLUCOSE SERPL-MCNC: 83 MG/DL (ref 70–99)
HCT VFR BLD AUTO: 32.8 % (ref 36–48)
HGB BLD-MCNC: 10.8 G/DL (ref 12–16)
LYMPHOCYTES # BLD: 0.5 K/UL (ref 1–5.1)
LYMPHOCYTES NFR BLD: 4.9 %
MAGNESIUM SERPL-MCNC: 2 MG/DL (ref 1.8–2.4)
MCH RBC QN AUTO: 30.5 PG (ref 26–34)
MCHC RBC AUTO-ENTMCNC: 32.8 G/DL (ref 31–36)
MCV RBC AUTO: 93.2 FL (ref 80–100)
MONOCYTES # BLD: 0.8 K/UL (ref 0–1.3)
MONOCYTES NFR BLD: 7.9 %
NEUTROPHILS # BLD: 8 K/UL (ref 1.7–7.7)
NEUTROPHILS NFR BLD: 81.3 %
PERFORMED ON: ABNORMAL
PLATELET # BLD AUTO: 154 K/UL (ref 135–450)
PMV BLD AUTO: 9.6 FL (ref 5–10.5)
POTASSIUM SERPL-SCNC: 3 MMOL/L (ref 3.5–5.1)
RBC # BLD AUTO: 3.52 M/UL (ref 4–5.2)
REASON FOR REJECTION: NORMAL
REJECTED TEST: NORMAL
SODIUM SERPL-SCNC: 138 MMOL/L (ref 136–145)
WBC # BLD AUTO: 9.8 K/UL (ref 4–11)

## 2023-06-30 PROCEDURE — 2700000000 HC OXYGEN THERAPY PER DAY

## 2023-06-30 PROCEDURE — 99232 SBSQ HOSP IP/OBS MODERATE 35: CPT | Performed by: NURSE PRACTITIONER

## 2023-06-30 PROCEDURE — 6370000000 HC RX 637 (ALT 250 FOR IP): Performed by: STUDENT IN AN ORGANIZED HEALTH CARE EDUCATION/TRAINING PROGRAM

## 2023-06-30 PROCEDURE — 85025 COMPLETE CBC W/AUTO DIFF WBC: CPT

## 2023-06-30 PROCEDURE — 6360000002 HC RX W HCPCS: Performed by: NURSE PRACTITIONER

## 2023-06-30 PROCEDURE — 36415 COLL VENOUS BLD VENIPUNCTURE: CPT

## 2023-06-30 PROCEDURE — 80048 BASIC METABOLIC PNL TOTAL CA: CPT

## 2023-06-30 PROCEDURE — 97110 THERAPEUTIC EXERCISES: CPT

## 2023-06-30 PROCEDURE — 97530 THERAPEUTIC ACTIVITIES: CPT

## 2023-06-30 PROCEDURE — 94760 N-INVAS EAR/PLS OXIMETRY 1: CPT

## 2023-06-30 PROCEDURE — 1200000000 HC SEMI PRIVATE

## 2023-06-30 PROCEDURE — 6370000000 HC RX 637 (ALT 250 FOR IP): Performed by: NURSE PRACTITIONER

## 2023-06-30 PROCEDURE — 2580000003 HC RX 258: Performed by: STUDENT IN AN ORGANIZED HEALTH CARE EDUCATION/TRAINING PROGRAM

## 2023-06-30 PROCEDURE — 83735 ASSAY OF MAGNESIUM: CPT

## 2023-06-30 PROCEDURE — 94640 AIRWAY INHALATION TREATMENT: CPT

## 2023-06-30 RX ORDER — POTASSIUM CHLORIDE 7.45 MG/ML
10 INJECTION INTRAVENOUS PRN
Status: DISCONTINUED | OUTPATIENT
Start: 2023-06-30 | End: 2023-07-02 | Stop reason: HOSPADM

## 2023-06-30 RX ORDER — POTASSIUM CHLORIDE 20 MEQ/1
40 TABLET, EXTENDED RELEASE ORAL PRN
Status: DISCONTINUED | OUTPATIENT
Start: 2023-06-30 | End: 2023-07-02 | Stop reason: HOSPADM

## 2023-06-30 RX ADMIN — POTASSIUM CHLORIDE 10 MEQ: 7.46 INJECTION, SOLUTION INTRAVENOUS at 22:25

## 2023-06-30 RX ADMIN — HYDRALAZINE HYDROCHLORIDE 50 MG: 50 TABLET, FILM COATED ORAL at 14:22

## 2023-06-30 RX ADMIN — HYDRALAZINE HYDROCHLORIDE 50 MG: 50 TABLET, FILM COATED ORAL at 06:01

## 2023-06-30 RX ADMIN — ISOSORBIDE MONONITRATE 120 MG: 60 TABLET, EXTENDED RELEASE ORAL at 10:21

## 2023-06-30 RX ADMIN — FUROSEMIDE 60 MG: 10 INJECTION, SOLUTION INTRAMUSCULAR; INTRAVENOUS at 19:51

## 2023-06-30 RX ADMIN — PANTOPRAZOLE SODIUM 40 MG: 40 TABLET, DELAYED RELEASE ORAL at 06:01

## 2023-06-30 RX ADMIN — HYDRALAZINE HYDROCHLORIDE 50 MG: 50 TABLET, FILM COATED ORAL at 22:21

## 2023-06-30 RX ADMIN — DOFETILIDE 250 MCG: 0.12 CAPSULE ORAL at 22:22

## 2023-06-30 RX ADMIN — FUROSEMIDE 60 MG: 10 INJECTION, SOLUTION INTRAMUSCULAR; INTRAVENOUS at 10:22

## 2023-06-30 RX ADMIN — OXYCODONE HYDROCHLORIDE AND ACETAMINOPHEN 500 MG: 500 TABLET ORAL at 10:22

## 2023-06-30 RX ADMIN — MOMETASONE FUROATE AND FORMOTEROL FUMARATE DIHYDRATE 2 PUFF: 200; 5 AEROSOL RESPIRATORY (INHALATION) at 19:20

## 2023-06-30 RX ADMIN — SODIUM CHLORIDE, PRESERVATIVE FREE 10 ML: 5 INJECTION INTRAVENOUS at 19:54

## 2023-06-30 RX ADMIN — RIVAROXABAN 15 MG: 15 TABLET, FILM COATED ORAL at 10:22

## 2023-06-30 RX ADMIN — POTASSIUM CHLORIDE 10 MEQ: 7.46 INJECTION, SOLUTION INTRAVENOUS at 18:00

## 2023-06-30 RX ADMIN — MOMETASONE FUROATE AND FORMOTEROL FUMARATE DIHYDRATE 2 PUFF: 200; 5 AEROSOL RESPIRATORY (INHALATION) at 09:06

## 2023-06-30 RX ADMIN — DOFETILIDE 250 MCG: 0.12 CAPSULE ORAL at 10:32

## 2023-06-30 RX ADMIN — DILTIAZEM HYDROCHLORIDE 120 MG: 120 CAPSULE, EXTENDED RELEASE ORAL at 10:22

## 2023-06-30 RX ADMIN — ROSUVASTATIN CALCIUM 20 MG: 20 TABLET, FILM COATED ORAL at 10:21

## 2023-06-30 RX ADMIN — FUROSEMIDE 60 MG: 10 INJECTION, SOLUTION INTRAMUSCULAR; INTRAVENOUS at 14:22

## 2023-06-30 RX ADMIN — SODIUM CHLORIDE, PRESERVATIVE FREE 10 ML: 5 INJECTION INTRAVENOUS at 10:22

## 2023-06-30 RX ADMIN — POTASSIUM CHLORIDE 10 MEQ: 7.46 INJECTION, SOLUTION INTRAVENOUS at 19:55

## 2023-06-30 RX ADMIN — RANOLAZINE 500 MG: 500 TABLET, FILM COATED, EXTENDED RELEASE ORAL at 10:21

## 2023-06-30 RX ADMIN — TIOTROPIUM BROMIDE INHALATION SPRAY 2 PUFF: 3.12 SPRAY, METERED RESPIRATORY (INHALATION) at 09:06

## 2023-06-30 RX ADMIN — RANOLAZINE 500 MG: 500 TABLET, FILM COATED, EXTENDED RELEASE ORAL at 19:56

## 2023-06-30 ASSESSMENT — PAIN SCALES - WONG BAKER
WONGBAKER_NUMERICALRESPONSE: 0

## 2023-07-01 LAB
ANION GAP SERPL CALCULATED.3IONS-SCNC: 10 MMOL/L (ref 3–16)
BASOPHILS # BLD: 0 K/UL (ref 0–0.2)
BASOPHILS NFR BLD: 0.4 %
BUN SERPL-MCNC: 21 MG/DL (ref 7–20)
CALCIUM SERPL-MCNC: 8.6 MG/DL (ref 8.3–10.6)
CHLORIDE SERPL-SCNC: 94 MMOL/L (ref 99–110)
CO2 SERPL-SCNC: 37 MMOL/L (ref 21–32)
CREAT SERPL-MCNC: 1.7 MG/DL (ref 0.6–1.2)
DEPRECATED RDW RBC AUTO: 16.4 % (ref 12.4–15.4)
EOSINOPHIL # BLD: 0.6 K/UL (ref 0–0.6)
EOSINOPHIL NFR BLD: 5.8 %
GFR SERPLBLD CREATININE-BSD FMLA CKD-EPI: 31 ML/MIN/{1.73_M2}
GLUCOSE SERPL-MCNC: 98 MG/DL (ref 70–99)
HCT VFR BLD AUTO: 31.2 % (ref 36–48)
HGB BLD-MCNC: 10.4 G/DL (ref 12–16)
LYMPHOCYTES # BLD: 0.7 K/UL (ref 1–5.1)
LYMPHOCYTES NFR BLD: 7.3 %
MCH RBC QN AUTO: 30.3 PG (ref 26–34)
MCHC RBC AUTO-ENTMCNC: 33.2 G/DL (ref 31–36)
MCV RBC AUTO: 91.4 FL (ref 80–100)
MONOCYTES # BLD: 0.8 K/UL (ref 0–1.3)
MONOCYTES NFR BLD: 7.7 %
NEUTROPHILS # BLD: 7.9 K/UL (ref 1.7–7.7)
NEUTROPHILS NFR BLD: 78.8 %
PLATELET # BLD AUTO: 172 K/UL (ref 135–450)
PMV BLD AUTO: 10 FL (ref 5–10.5)
POTASSIUM SERPL-SCNC: 3.6 MMOL/L (ref 3.5–5.1)
POTASSIUM SERPL-SCNC: 3.6 MMOL/L (ref 3.5–5.1)
RBC # BLD AUTO: 3.41 M/UL (ref 4–5.2)
SODIUM SERPL-SCNC: 141 MMOL/L (ref 136–145)
WBC # BLD AUTO: 10 K/UL (ref 4–11)

## 2023-07-01 PROCEDURE — 2700000000 HC OXYGEN THERAPY PER DAY

## 2023-07-01 PROCEDURE — 6360000002 HC RX W HCPCS: Performed by: NURSE PRACTITIONER

## 2023-07-01 PROCEDURE — 94760 N-INVAS EAR/PLS OXIMETRY 1: CPT

## 2023-07-01 PROCEDURE — 36415 COLL VENOUS BLD VENIPUNCTURE: CPT

## 2023-07-01 PROCEDURE — 80048 BASIC METABOLIC PNL TOTAL CA: CPT

## 2023-07-01 PROCEDURE — 84132 ASSAY OF SERUM POTASSIUM: CPT

## 2023-07-01 PROCEDURE — 94640 AIRWAY INHALATION TREATMENT: CPT

## 2023-07-01 PROCEDURE — 1200000000 HC SEMI PRIVATE

## 2023-07-01 PROCEDURE — 2580000003 HC RX 258: Performed by: STUDENT IN AN ORGANIZED HEALTH CARE EDUCATION/TRAINING PROGRAM

## 2023-07-01 PROCEDURE — 85025 COMPLETE CBC W/AUTO DIFF WBC: CPT

## 2023-07-01 PROCEDURE — 6370000000 HC RX 637 (ALT 250 FOR IP): Performed by: NURSE PRACTITIONER

## 2023-07-01 PROCEDURE — 99232 SBSQ HOSP IP/OBS MODERATE 35: CPT | Performed by: NURSE PRACTITIONER

## 2023-07-01 PROCEDURE — 6370000000 HC RX 637 (ALT 250 FOR IP): Performed by: STUDENT IN AN ORGANIZED HEALTH CARE EDUCATION/TRAINING PROGRAM

## 2023-07-01 RX ADMIN — ROSUVASTATIN CALCIUM 20 MG: 20 TABLET, FILM COATED ORAL at 08:36

## 2023-07-01 RX ADMIN — MOMETASONE FUROATE AND FORMOTEROL FUMARATE DIHYDRATE 2 PUFF: 200; 5 AEROSOL RESPIRATORY (INHALATION) at 07:49

## 2023-07-01 RX ADMIN — HYDRALAZINE HYDROCHLORIDE 50 MG: 50 TABLET, FILM COATED ORAL at 20:23

## 2023-07-01 RX ADMIN — DILTIAZEM HYDROCHLORIDE 120 MG: 120 CAPSULE, EXTENDED RELEASE ORAL at 08:36

## 2023-07-01 RX ADMIN — OXYCODONE HYDROCHLORIDE AND ACETAMINOPHEN 500 MG: 500 TABLET ORAL at 08:36

## 2023-07-01 RX ADMIN — TIOTROPIUM BROMIDE INHALATION SPRAY 2 PUFF: 3.12 SPRAY, METERED RESPIRATORY (INHALATION) at 07:49

## 2023-07-01 RX ADMIN — RANOLAZINE 500 MG: 500 TABLET, FILM COATED, EXTENDED RELEASE ORAL at 08:36

## 2023-07-01 RX ADMIN — HYDRALAZINE HYDROCHLORIDE 50 MG: 50 TABLET, FILM COATED ORAL at 05:01

## 2023-07-01 RX ADMIN — MOMETASONE FUROATE AND FORMOTEROL FUMARATE DIHYDRATE 2 PUFF: 200; 5 AEROSOL RESPIRATORY (INHALATION) at 20:28

## 2023-07-01 RX ADMIN — RIVAROXABAN 15 MG: 15 TABLET, FILM COATED ORAL at 08:36

## 2023-07-01 RX ADMIN — SODIUM CHLORIDE, PRESERVATIVE FREE 10 ML: 5 INJECTION INTRAVENOUS at 20:23

## 2023-07-01 RX ADMIN — POTASSIUM CHLORIDE 10 MEQ: 7.46 INJECTION, SOLUTION INTRAVENOUS at 04:25

## 2023-07-01 RX ADMIN — FUROSEMIDE 60 MG: 10 INJECTION, SOLUTION INTRAMUSCULAR; INTRAVENOUS at 13:15

## 2023-07-01 RX ADMIN — SODIUM CHLORIDE, PRESERVATIVE FREE 10 ML: 5 INJECTION INTRAVENOUS at 08:38

## 2023-07-01 RX ADMIN — FUROSEMIDE 60 MG: 10 INJECTION, SOLUTION INTRAMUSCULAR; INTRAVENOUS at 08:36

## 2023-07-01 RX ADMIN — DOFETILIDE 250 MCG: 0.12 CAPSULE ORAL at 20:22

## 2023-07-01 RX ADMIN — ISOSORBIDE MONONITRATE 120 MG: 60 TABLET, EXTENDED RELEASE ORAL at 08:36

## 2023-07-01 RX ADMIN — HYDRALAZINE HYDROCHLORIDE 50 MG: 50 TABLET, FILM COATED ORAL at 13:15

## 2023-07-01 RX ADMIN — POTASSIUM CHLORIDE 10 MEQ: 7.46 INJECTION, SOLUTION INTRAVENOUS at 03:15

## 2023-07-01 RX ADMIN — PANTOPRAZOLE SODIUM 40 MG: 40 TABLET, DELAYED RELEASE ORAL at 05:01

## 2023-07-01 RX ADMIN — RANOLAZINE 500 MG: 500 TABLET, FILM COATED, EXTENDED RELEASE ORAL at 20:23

## 2023-07-01 RX ADMIN — DOFETILIDE 250 MCG: 0.12 CAPSULE ORAL at 08:36

## 2023-07-01 RX ADMIN — POTASSIUM CHLORIDE 10 MEQ: 7.46 INJECTION, SOLUTION INTRAVENOUS at 00:53

## 2023-07-01 RX ADMIN — FUROSEMIDE 60 MG: 10 INJECTION, SOLUTION INTRAMUSCULAR; INTRAVENOUS at 20:23

## 2023-07-02 VITALS
DIASTOLIC BLOOD PRESSURE: 69 MMHG | HEIGHT: 63 IN | BODY MASS INDEX: 41.8 KG/M2 | SYSTOLIC BLOOD PRESSURE: 160 MMHG | TEMPERATURE: 98.2 F | HEART RATE: 54 BPM | RESPIRATION RATE: 18 BRPM | WEIGHT: 235.89 LBS | OXYGEN SATURATION: 97 %

## 2023-07-02 LAB
ANION GAP SERPL CALCULATED.3IONS-SCNC: 10 MMOL/L (ref 3–16)
BASOPHILS # BLD: 0 K/UL (ref 0–0.2)
BASOPHILS NFR BLD: 0.4 %
BUN SERPL-MCNC: 21 MG/DL (ref 7–20)
CALCIUM SERPL-MCNC: 8.8 MG/DL (ref 8.3–10.6)
CHLORIDE SERPL-SCNC: 93 MMOL/L (ref 99–110)
CO2 SERPL-SCNC: 37 MMOL/L (ref 21–32)
CREAT SERPL-MCNC: 1.8 MG/DL (ref 0.6–1.2)
DEPRECATED RDW RBC AUTO: 16.7 % (ref 12.4–15.4)
EOSINOPHIL # BLD: 0.5 K/UL (ref 0–0.6)
EOSINOPHIL NFR BLD: 5.4 %
GFR SERPLBLD CREATININE-BSD FMLA CKD-EPI: 29 ML/MIN/{1.73_M2}
GLUCOSE SERPL-MCNC: 98 MG/DL (ref 70–99)
HCT VFR BLD AUTO: 31.6 % (ref 36–48)
HGB BLD-MCNC: 10.4 G/DL (ref 12–16)
LYMPHOCYTES # BLD: 0.7 K/UL (ref 1–5.1)
LYMPHOCYTES NFR BLD: 7.3 %
MAGNESIUM SERPL-MCNC: 1.8 MG/DL (ref 1.8–2.4)
MCH RBC QN AUTO: 30 PG (ref 26–34)
MCHC RBC AUTO-ENTMCNC: 32.8 G/DL (ref 31–36)
MCV RBC AUTO: 91.6 FL (ref 80–100)
MONOCYTES # BLD: 0.7 K/UL (ref 0–1.3)
MONOCYTES NFR BLD: 6.8 %
NEUTROPHILS # BLD: 7.9 K/UL (ref 1.7–7.7)
NEUTROPHILS NFR BLD: 80.1 %
PLATELET # BLD AUTO: 172 K/UL (ref 135–450)
PMV BLD AUTO: 9.9 FL (ref 5–10.5)
POTASSIUM SERPL-SCNC: 3.2 MMOL/L (ref 3.5–5.1)
RBC # BLD AUTO: 3.45 M/UL (ref 4–5.2)
SODIUM SERPL-SCNC: 140 MMOL/L (ref 136–145)
WBC # BLD AUTO: 9.9 K/UL (ref 4–11)

## 2023-07-02 PROCEDURE — 85025 COMPLETE CBC W/AUTO DIFF WBC: CPT

## 2023-07-02 PROCEDURE — 2700000000 HC OXYGEN THERAPY PER DAY

## 2023-07-02 PROCEDURE — 6370000000 HC RX 637 (ALT 250 FOR IP): Performed by: STUDENT IN AN ORGANIZED HEALTH CARE EDUCATION/TRAINING PROGRAM

## 2023-07-02 PROCEDURE — 6370000000 HC RX 637 (ALT 250 FOR IP): Performed by: NURSE PRACTITIONER

## 2023-07-02 PROCEDURE — 80048 BASIC METABOLIC PNL TOTAL CA: CPT

## 2023-07-02 PROCEDURE — 6360000002 HC RX W HCPCS: Performed by: STUDENT IN AN ORGANIZED HEALTH CARE EDUCATION/TRAINING PROGRAM

## 2023-07-02 PROCEDURE — 94640 AIRWAY INHALATION TREATMENT: CPT

## 2023-07-02 PROCEDURE — 2580000003 HC RX 258: Performed by: STUDENT IN AN ORGANIZED HEALTH CARE EDUCATION/TRAINING PROGRAM

## 2023-07-02 PROCEDURE — 83735 ASSAY OF MAGNESIUM: CPT

## 2023-07-02 PROCEDURE — 94761 N-INVAS EAR/PLS OXIMETRY MLT: CPT

## 2023-07-02 PROCEDURE — 36415 COLL VENOUS BLD VENIPUNCTURE: CPT

## 2023-07-02 RX ADMIN — HYDRALAZINE HYDROCHLORIDE 50 MG: 50 TABLET, FILM COATED ORAL at 15:22

## 2023-07-02 RX ADMIN — DOFETILIDE 250 MCG: 0.12 CAPSULE ORAL at 07:40

## 2023-07-02 RX ADMIN — ISOSORBIDE MONONITRATE 120 MG: 60 TABLET, EXTENDED RELEASE ORAL at 07:35

## 2023-07-02 RX ADMIN — MOMETASONE FUROATE AND FORMOTEROL FUMARATE DIHYDRATE 2 PUFF: 200; 5 AEROSOL RESPIRATORY (INHALATION) at 08:43

## 2023-07-02 RX ADMIN — RANOLAZINE 500 MG: 500 TABLET, FILM COATED, EXTENDED RELEASE ORAL at 07:35

## 2023-07-02 RX ADMIN — HYDRALAZINE HYDROCHLORIDE 50 MG: 50 TABLET, FILM COATED ORAL at 06:19

## 2023-07-02 RX ADMIN — OXYCODONE HYDROCHLORIDE AND ACETAMINOPHEN 500 MG: 500 TABLET ORAL at 07:35

## 2023-07-02 RX ADMIN — ALBUTEROL SULFATE 2.5 MG: 2.5 SOLUTION RESPIRATORY (INHALATION) at 08:43

## 2023-07-02 RX ADMIN — ROSUVASTATIN CALCIUM 20 MG: 20 TABLET, FILM COATED ORAL at 07:35

## 2023-07-02 RX ADMIN — TIOTROPIUM BROMIDE INHALATION SPRAY 2 PUFF: 3.12 SPRAY, METERED RESPIRATORY (INHALATION) at 08:43

## 2023-07-02 RX ADMIN — RIVAROXABAN 15 MG: 15 TABLET, FILM COATED ORAL at 07:35

## 2023-07-02 RX ADMIN — SODIUM CHLORIDE, PRESERVATIVE FREE 10 ML: 5 INJECTION INTRAVENOUS at 07:36

## 2023-07-02 RX ADMIN — DILTIAZEM HYDROCHLORIDE 120 MG: 120 CAPSULE, EXTENDED RELEASE ORAL at 07:35

## 2023-07-02 RX ADMIN — POTASSIUM CHLORIDE 40 MEQ: 1500 TABLET, EXTENDED RELEASE ORAL at 11:38

## 2023-07-02 RX ADMIN — PANTOPRAZOLE SODIUM 40 MG: 40 TABLET, DELAYED RELEASE ORAL at 06:19

## 2023-07-03 NOTE — CARE COORDINATION
[facility]    DC order noted, all docs needed have been faxed to [facility] for home care services.     Home care to see patient by 7/4/23    Todd Arriola RN, BSN CTN  [facility] [phone]

## 2023-07-05 ENCOUNTER — TELEPHONE (OUTPATIENT)
Dept: FAMILY MEDICINE CLINIC | Age: 78
End: 2023-07-05

## 2023-07-05 NOTE — TELEPHONE ENCOUNTER
Nurse needs a verball ok for homecare/pt and ot. Please call Antoinna Bustos at [phone]. Also while in hospital she was given lipitor but when she was sent home they did not give her a script for Lipitor and since she had crestor at home, she started taking that again. Please advise if she needs to take Lipitor now and if so she will need a script.

## 2023-07-07 ENCOUNTER — OFFICE VISIT (OUTPATIENT)
Dept: CARDIOLOGY CLINIC | Age: 78
End: 2023-07-07
Payer: MEDICARE

## 2023-07-07 VITALS
DIASTOLIC BLOOD PRESSURE: 70 MMHG | SYSTOLIC BLOOD PRESSURE: 128 MMHG | BODY MASS INDEX: 41.29 KG/M2 | OXYGEN SATURATION: 96 % | HEIGHT: 63 IN | HEART RATE: 58 BPM | WEIGHT: 233 LBS

## 2023-07-07 DIAGNOSIS — I50.33 ACUTE ON CHRONIC DIASTOLIC HEART FAILURE (HCC): Primary | ICD-10-CM

## 2023-07-07 DIAGNOSIS — D64.9 CHRONIC ANEMIA: ICD-10-CM

## 2023-07-07 DIAGNOSIS — I10 ESSENTIAL HYPERTENSION: ICD-10-CM

## 2023-07-07 DIAGNOSIS — I48.0 PAF (PAROXYSMAL ATRIAL FIBRILLATION) (HCC): ICD-10-CM

## 2023-07-07 DIAGNOSIS — I25.10 CAD IN NATIVE ARTERY: ICD-10-CM

## 2023-07-07 DIAGNOSIS — E78.5 HYPERLIPIDEMIA LDL GOAL <70: ICD-10-CM

## 2023-07-07 DIAGNOSIS — E78.2 MIXED HYPERLIPIDEMIA: ICD-10-CM

## 2023-07-07 PROCEDURE — 99214 OFFICE O/P EST MOD 30 MIN: CPT | Performed by: INTERNAL MEDICINE

## 2023-07-07 PROCEDURE — G8427 DOCREV CUR MEDS BY ELIG CLIN: HCPCS | Performed by: INTERNAL MEDICINE

## 2023-07-07 PROCEDURE — 3074F SYST BP LT 130 MM HG: CPT | Performed by: INTERNAL MEDICINE

## 2023-07-07 PROCEDURE — 1111F DSCHRG MED/CURRENT MED MERGE: CPT | Performed by: INTERNAL MEDICINE

## 2023-07-07 PROCEDURE — 1123F ACP DISCUSS/DSCN MKR DOCD: CPT | Performed by: INTERNAL MEDICINE

## 2023-07-07 PROCEDURE — G8417 CALC BMI ABV UP PARAM F/U: HCPCS | Performed by: INTERNAL MEDICINE

## 2023-07-07 PROCEDURE — 1036F TOBACCO NON-USER: CPT | Performed by: INTERNAL MEDICINE

## 2023-07-07 PROCEDURE — G8399 PT W/DXA RESULTS DOCUMENT: HCPCS | Performed by: INTERNAL MEDICINE

## 2023-07-07 PROCEDURE — 3078F DIAST BP <80 MM HG: CPT | Performed by: INTERNAL MEDICINE

## 2023-07-07 PROCEDURE — 1090F PRES/ABSN URINE INCON ASSESS: CPT | Performed by: INTERNAL MEDICINE

## 2023-07-07 RX ORDER — ROSUVASTATIN CALCIUM 20 MG/1
20 TABLET, COATED ORAL DAILY
COMMUNITY

## 2023-07-07 RX ORDER — TORSEMIDE 20 MG/1
40 TABLET ORAL DAILY
Qty: 60 TABLET | Refills: 11 | Status: SHIPPED | OUTPATIENT
Start: 2023-07-07

## 2023-07-07 NOTE — PATIENT INSTRUCTIONS
1) Increase torsemide from 20 mg daily to 40 mg daily  2) Return to Cardi[facility]ems pillow downloads daily as soon as possible. 3) Work on a low sodium diet daily   4) Labs 1-2 days prior to next f/u with CHANDA Chu in 2 weeks.

## 2023-07-11 ENCOUNTER — TELEPHONE (OUTPATIENT)
Dept: FAMILY MEDICINE CLINIC | Age: 78
End: 2023-07-11

## 2023-07-11 DIAGNOSIS — R30.0 DYSURIA: Primary | ICD-10-CM

## 2023-07-11 RX ORDER — NITROFURANTOIN 25; 75 MG/1; MG/1
100 CAPSULE ORAL 2 TIMES DAILY
Qty: 20 CAPSULE | Refills: 0 | Status: SHIPPED | OUTPATIENT
Start: 2023-07-11 | End: 2023-07-21

## 2023-07-13 ENCOUNTER — TELEPHONE (OUTPATIENT)
Dept: CARDIOLOGY CLINIC | Age: 78
End: 2023-07-13

## 2023-07-13 NOTE — TELEPHONE ENCOUNTER
Hilary Busch called in from [address] , he states Janet Redman declined her Xarelto 20 mg, she stated that was the wrong dosage and it should be 15  mg

## 2023-07-13 NOTE — TELEPHONE ENCOUNTER
Refill sent to [facility] in [place] for the Xarelto 15 mg daily as prescribed.

## 2023-07-19 ENCOUNTER — TELEPHONE (OUTPATIENT)
Dept: FAMILY MEDICINE CLINIC | Age: 78
End: 2023-07-19
Payer: MEDICARE

## 2023-07-19 DIAGNOSIS — I13.0 HYPERTENSIVE HEART AND RENAL DISEASE WITH CONGESTIVE HEART FAILURE (HCC): Primary | ICD-10-CM

## 2023-07-19 PROCEDURE — G0180 MD CERTIFICATION HHA PATIENT: HCPCS | Performed by: FAMILY MEDICINE

## 2023-07-28 DIAGNOSIS — I50.33 ACUTE ON CHRONIC DIASTOLIC HEART FAILURE (HCC): ICD-10-CM

## 2023-07-28 DIAGNOSIS — I10 ESSENTIAL HYPERTENSION: ICD-10-CM

## 2023-07-28 DIAGNOSIS — D64.9 CHRONIC ANEMIA: ICD-10-CM

## 2023-07-28 DIAGNOSIS — I25.10 CAD IN NATIVE ARTERY: ICD-10-CM

## 2023-07-28 LAB
ANION GAP SERPL CALCULATED.3IONS-SCNC: 10 MMOL/L (ref 3–16)
BUN SERPL-MCNC: 22 MG/DL (ref 7–20)
CALCIUM SERPL-MCNC: 9 MG/DL (ref 8.3–10.6)
CHLORIDE SERPL-SCNC: 95 MMOL/L (ref 99–110)
CO2 SERPL-SCNC: 36 MMOL/L (ref 21–32)
CREAT SERPL-MCNC: 1.5 MG/DL (ref 0.6–1.2)
DEPRECATED RDW RBC AUTO: 17.3 % (ref 12.4–15.4)
GFR SERPLBLD CREATININE-BSD FMLA CKD-EPI: 36 ML/MIN/{1.73_M2}
GLUCOSE SERPL-MCNC: 96 MG/DL (ref 70–99)
HCT VFR BLD AUTO: 30.2 % (ref 36–48)
HGB BLD-MCNC: 10 G/DL (ref 12–16)
MCH RBC QN AUTO: 30 PG (ref 26–34)
MCHC RBC AUTO-ENTMCNC: 33 G/DL (ref 31–36)
MCV RBC AUTO: 90.9 FL (ref 80–100)
NT-PROBNP SERPL-MCNC: 6865 PG/ML (ref 0–449)
PLATELET # BLD AUTO: 173 K/UL (ref 135–450)
PMV BLD AUTO: 10.6 FL (ref 5–10.5)
POTASSIUM SERPL-SCNC: 3.8 MMOL/L (ref 3.5–5.1)
RBC # BLD AUTO: 3.32 M/UL (ref 4–5.2)
SODIUM SERPL-SCNC: 141 MMOL/L (ref 136–145)
WBC # BLD AUTO: 8.1 K/UL (ref 4–11)

## 2023-07-31 ENCOUNTER — APPOINTMENT (OUTPATIENT)
Dept: GENERAL RADIOLOGY | Age: 78
DRG: 291 | End: 2023-07-31
Payer: MEDICARE

## 2023-07-31 ENCOUNTER — HOSPITAL ENCOUNTER (INPATIENT)
Age: 78
LOS: 9 days | Discharge: HOME | DRG: 291 | End: 2023-08-09
Attending: EMERGENCY MEDICINE | Admitting: FAMILY MEDICINE
Payer: MEDICARE

## 2023-07-31 DIAGNOSIS — R60.0 BILATERAL LOWER EXTREMITY EDEMA: ICD-10-CM

## 2023-07-31 DIAGNOSIS — R06.02 SHORTNESS OF BREATH: ICD-10-CM

## 2023-07-31 DIAGNOSIS — R79.89 ELEVATED BRAIN NATRIURETIC PEPTIDE (BNP) LEVEL: ICD-10-CM

## 2023-07-31 DIAGNOSIS — R09.02 HYPOXIA: ICD-10-CM

## 2023-07-31 DIAGNOSIS — I16.0 HYPERTENSIVE URGENCY: ICD-10-CM

## 2023-07-31 DIAGNOSIS — J44.1 COPD EXACERBATION (HCC): Primary | ICD-10-CM

## 2023-07-31 LAB
ALBUMIN SERPL-MCNC: 3.6 G/DL (ref 3.4–5)
ALBUMIN/GLOB SERPL: 1.3 {RATIO} (ref 1.1–2.2)
ALP SERPL-CCNC: 82 U/L (ref 40–129)
ALT SERPL-CCNC: 9 U/L (ref 10–40)
ANION GAP SERPL CALCULATED.3IONS-SCNC: 9 MMOL/L (ref 3–16)
AST SERPL-CCNC: 17 U/L (ref 15–37)
BACTERIA URNS QL MICRO: NORMAL /HPF
BASE EXCESS BLDV CALC-SCNC: 12.7 MMOL/L
BASOPHILS # BLD: 0 K/UL (ref 0–0.2)
BASOPHILS NFR BLD: 0.3 %
BILIRUB SERPL-MCNC: 0.5 MG/DL (ref 0–1)
BILIRUB UR QL STRIP.AUTO: NEGATIVE
BUN SERPL-MCNC: 19 MG/DL (ref 7–20)
CALCIUM SERPL-MCNC: 9.2 MG/DL (ref 8.3–10.6)
CHLORIDE SERPL-SCNC: 94 MMOL/L (ref 99–110)
CLARITY UR: CLEAR
CO2 BLDV-SCNC: 41 MMOL/L
CO2 SERPL-SCNC: 35 MMOL/L (ref 21–32)
COHGB MFR BLDV: 2.1 %
COLOR UR: YELLOW
CREAT SERPL-MCNC: 1.4 MG/DL (ref 0.6–1.2)
DEPRECATED RDW RBC AUTO: 17 % (ref 12.4–15.4)
EKG DIAGNOSIS: NORMAL
EKG Q-T INTERVAL: 372 MS
EKG QRS DURATION: 78 MS
EKG QTC CALCULATION (BAZETT): 487 MS
EKG R AXIS: -10 DEGREES
EKG T AXIS: 33 DEGREES
EKG VENTRICULAR RATE: 103 BPM
EOSINOPHIL # BLD: 0.2 K/UL (ref 0–0.6)
EOSINOPHIL NFR BLD: 2 %
EPI CELLS #/AREA URNS AUTO: 1 /HPF (ref 0–5)
GFR SERPLBLD CREATININE-BSD FMLA CKD-EPI: 39 ML/MIN/{1.73_M2}
GLUCOSE SERPL-MCNC: 122 MG/DL (ref 70–99)
GLUCOSE UR STRIP.AUTO-MCNC: 250 MG/DL
HCO3 BLDV-SCNC: 39 MMOL/L (ref 23–29)
HCT VFR BLD AUTO: 30.3 % (ref 36–48)
HGB BLD-MCNC: 9.7 G/DL (ref 12–16)
HGB UR QL STRIP.AUTO: NEGATIVE
HYALINE CASTS #/AREA URNS AUTO: 0 /LPF (ref 0–8)
KETONES UR STRIP.AUTO-MCNC: NEGATIVE MG/DL
LACTATE BLDV-SCNC: 1.4 MMOL/L (ref 0.4–1.9)
LEUKOCYTE ESTERASE UR QL STRIP.AUTO: NEGATIVE
LIPASE SERPL-CCNC: 24 U/L (ref 13–60)
LYMPHOCYTES # BLD: 0.4 K/UL (ref 1–5.1)
LYMPHOCYTES NFR BLD: 3.9 %
MCH RBC QN AUTO: 28.9 PG (ref 26–34)
MCHC RBC AUTO-ENTMCNC: 32 G/DL (ref 31–36)
MCV RBC AUTO: 90.1 FL (ref 80–100)
METHGB MFR BLDV: 0.5 %
MONOCYTES # BLD: 0.9 K/UL (ref 0–1.3)
MONOCYTES NFR BLD: 7.5 %
NEUTROPHILS # BLD: 10 K/UL (ref 1.7–7.7)
NEUTROPHILS NFR BLD: 86.3 %
NITRITE UR QL STRIP.AUTO: NEGATIVE
NT-PROBNP SERPL-MCNC: 5106 PG/ML (ref 0–449)
O2 THERAPY: ABNORMAL
PCO2 BLDV: 59.6 MMHG (ref 40–50)
PH BLDV: 7.42 [PH] (ref 7.35–7.45)
PH UR STRIP.AUTO: >=9 [PH] (ref 5–8)
PLATELET # BLD AUTO: 179 K/UL (ref 135–450)
PMV BLD AUTO: 10.1 FL (ref 5–10.5)
PO2 BLDV: 49 MMHG
POTASSIUM SERPL-SCNC: 4.2 MMOL/L (ref 3.5–5.1)
PROT SERPL-MCNC: 6.3 G/DL (ref 6.4–8.2)
PROT UR STRIP.AUTO-MCNC: 30 MG/DL
RBC # BLD AUTO: 3.36 M/UL (ref 4–5.2)
RBC CLUMPS #/AREA URNS AUTO: 1 /HPF (ref 0–4)
SAO2 % BLDV: 82 %
SODIUM SERPL-SCNC: 138 MMOL/L (ref 136–145)
SP GR UR STRIP.AUTO: 1.01 (ref 1–1.03)
TROPONIN, HIGH SENSITIVITY: 101 NG/L (ref 0–14)
TROPONIN, HIGH SENSITIVITY: 98 NG/L (ref 0–14)
TROPONIN, HIGH SENSITIVITY: 99 NG/L (ref 0–14)
UA COMPLETE W REFLEX CULTURE PNL UR: ABNORMAL
UA DIPSTICK W REFLEX MICRO PNL UR: YES
URN SPEC COLLECT METH UR: ABNORMAL
UROBILINOGEN UR STRIP-ACNC: 0.2 E.U./DL
WBC # BLD AUTO: 11.5 K/UL (ref 4–11)
WBC #/AREA URNS AUTO: 0 /HPF (ref 0–5)

## 2023-07-31 PROCEDURE — 6360000002 HC RX W HCPCS: Performed by: FAMILY MEDICINE

## 2023-07-31 PROCEDURE — 96374 THER/PROPH/DIAG INJ IV PUSH: CPT

## 2023-07-31 PROCEDURE — 80053 COMPREHEN METABOLIC PANEL: CPT

## 2023-07-31 PROCEDURE — 2700000000 HC OXYGEN THERAPY PER DAY

## 2023-07-31 PROCEDURE — 71045 X-RAY EXAM CHEST 1 VIEW: CPT

## 2023-07-31 PROCEDURE — 36415 COLL VENOUS BLD VENIPUNCTURE: CPT

## 2023-07-31 PROCEDURE — 83690 ASSAY OF LIPASE: CPT

## 2023-07-31 PROCEDURE — 1200000000 HC SEMI PRIVATE

## 2023-07-31 PROCEDURE — 94761 N-INVAS EAR/PLS OXIMETRY MLT: CPT

## 2023-07-31 PROCEDURE — 6370000000 HC RX 637 (ALT 250 FOR IP): Performed by: FAMILY MEDICINE

## 2023-07-31 PROCEDURE — 94640 AIRWAY INHALATION TREATMENT: CPT

## 2023-07-31 PROCEDURE — 93005 ELECTROCARDIOGRAM TRACING: CPT | Performed by: EMERGENCY MEDICINE

## 2023-07-31 PROCEDURE — 99285 EMERGENCY DEPT VISIT HI MDM: CPT

## 2023-07-31 PROCEDURE — 87040 BLOOD CULTURE FOR BACTERIA: CPT

## 2023-07-31 PROCEDURE — 83605 ASSAY OF LACTIC ACID: CPT

## 2023-07-31 PROCEDURE — 84484 ASSAY OF TROPONIN QUANT: CPT

## 2023-07-31 PROCEDURE — 6360000002 HC RX W HCPCS: Performed by: EMERGENCY MEDICINE

## 2023-07-31 PROCEDURE — 82803 BLOOD GASES ANY COMBINATION: CPT

## 2023-07-31 PROCEDURE — 2580000003 HC RX 258: Performed by: FAMILY MEDICINE

## 2023-07-31 PROCEDURE — 81001 URINALYSIS AUTO W/SCOPE: CPT

## 2023-07-31 PROCEDURE — 85025 COMPLETE CBC W/AUTO DIFF WBC: CPT

## 2023-07-31 PROCEDURE — 96375 TX/PRO/DX INJ NEW DRUG ADDON: CPT

## 2023-07-31 PROCEDURE — 6370000000 HC RX 637 (ALT 250 FOR IP): Performed by: EMERGENCY MEDICINE

## 2023-07-31 PROCEDURE — 93010 ELECTROCARDIOGRAM REPORT: CPT | Performed by: INTERNAL MEDICINE

## 2023-07-31 PROCEDURE — 83880 ASSAY OF NATRIURETIC PEPTIDE: CPT

## 2023-07-31 RX ORDER — FUROSEMIDE 10 MG/ML
20 INJECTION INTRAMUSCULAR; INTRAVENOUS ONCE
Status: COMPLETED | OUTPATIENT
Start: 2023-07-31 | End: 2023-07-31

## 2023-07-31 RX ORDER — HYDRALAZINE HYDROCHLORIDE 20 MG/ML
5 INJECTION INTRAMUSCULAR; INTRAVENOUS EVERY 6 HOURS PRN
Status: DISCONTINUED | OUTPATIENT
Start: 2023-07-31 | End: 2023-08-01

## 2023-07-31 RX ORDER — ROSUVASTATIN CALCIUM 20 MG/1
20 TABLET, COATED ORAL DAILY
Status: DISCONTINUED | OUTPATIENT
Start: 2023-07-31 | End: 2023-08-09 | Stop reason: HOSPADM

## 2023-07-31 RX ORDER — SODIUM CHLORIDE 0.9 % (FLUSH) 0.9 %
5-40 SYRINGE (ML) INJECTION PRN
Status: DISCONTINUED | OUTPATIENT
Start: 2023-07-31 | End: 2023-08-09 | Stop reason: HOSPADM

## 2023-07-31 RX ORDER — RANOLAZINE 500 MG/1
500 TABLET, EXTENDED RELEASE ORAL 2 TIMES DAILY
Status: DISCONTINUED | OUTPATIENT
Start: 2023-07-31 | End: 2023-08-09 | Stop reason: HOSPADM

## 2023-07-31 RX ORDER — FUROSEMIDE 10 MG/ML
40 INJECTION INTRAMUSCULAR; INTRAVENOUS 2 TIMES DAILY
Status: DISCONTINUED | OUTPATIENT
Start: 2023-07-31 | End: 2023-08-01

## 2023-07-31 RX ORDER — HYDRALAZINE HYDROCHLORIDE 50 MG/1
50 TABLET, FILM COATED ORAL EVERY 8 HOURS SCHEDULED
Status: DISCONTINUED | OUTPATIENT
Start: 2023-07-31 | End: 2023-08-09 | Stop reason: HOSPADM

## 2023-07-31 RX ORDER — POLYETHYLENE GLYCOL 3350 17 G/17G
17 POWDER, FOR SOLUTION ORAL DAILY PRN
Status: DISCONTINUED | OUTPATIENT
Start: 2023-07-31 | End: 2023-08-09 | Stop reason: HOSPADM

## 2023-07-31 RX ORDER — DOFETILIDE 0.25 MG/1
250 CAPSULE ORAL EVERY 12 HOURS SCHEDULED
Status: DISCONTINUED | OUTPATIENT
Start: 2023-07-31 | End: 2023-08-09 | Stop reason: HOSPADM

## 2023-07-31 RX ORDER — PANTOPRAZOLE SODIUM 40 MG/1
40 TABLET, DELAYED RELEASE ORAL
Status: DISCONTINUED | OUTPATIENT
Start: 2023-08-01 | End: 2023-08-09 | Stop reason: HOSPADM

## 2023-07-31 RX ORDER — SODIUM CHLORIDE 0.9 % (FLUSH) 0.9 %
5-40 SYRINGE (ML) INJECTION EVERY 12 HOURS SCHEDULED
Status: DISCONTINUED | OUTPATIENT
Start: 2023-07-31 | End: 2023-08-09 | Stop reason: HOSPADM

## 2023-07-31 RX ORDER — CLONIDINE HYDROCHLORIDE 0.1 MG/1
0.1 TABLET ORAL ONCE
Status: COMPLETED | OUTPATIENT
Start: 2023-07-31 | End: 2023-07-31

## 2023-07-31 RX ORDER — PREDNISONE 20 MG/1
40 TABLET ORAL DAILY
Status: DISCONTINUED | OUTPATIENT
Start: 2023-08-01 | End: 2023-08-04

## 2023-07-31 RX ORDER — SODIUM CHLORIDE 9 MG/ML
INJECTION, SOLUTION INTRAVENOUS PRN
Status: DISCONTINUED | OUTPATIENT
Start: 2023-07-31 | End: 2023-08-09 | Stop reason: HOSPADM

## 2023-07-31 RX ORDER — ISOSORBIDE MONONITRATE 60 MG/1
120 TABLET, EXTENDED RELEASE ORAL DAILY
Status: DISCONTINUED | OUTPATIENT
Start: 2023-07-31 | End: 2023-08-09 | Stop reason: HOSPADM

## 2023-07-31 RX ORDER — DEXAMETHASONE SODIUM PHOSPHATE 4 MG/ML
4 INJECTION, SOLUTION INTRA-ARTICULAR; INTRALESIONAL; INTRAMUSCULAR; INTRAVENOUS; SOFT TISSUE ONCE
Status: COMPLETED | OUTPATIENT
Start: 2023-07-31 | End: 2023-07-31

## 2023-07-31 RX ORDER — IPRATROPIUM BROMIDE AND ALBUTEROL SULFATE 2.5; .5 MG/3ML; MG/3ML
3 SOLUTION RESPIRATORY (INHALATION) ONCE
Status: COMPLETED | OUTPATIENT
Start: 2023-07-31 | End: 2023-07-31

## 2023-07-31 RX ORDER — FERROUS SULFATE TAB EC 324 MG (65 MG FE EQUIVALENT) 324 (65 FE) MG
324 TABLET DELAYED RESPONSE ORAL 2 TIMES DAILY
Status: DISCONTINUED | OUTPATIENT
Start: 2023-07-31 | End: 2023-08-09 | Stop reason: HOSPADM

## 2023-07-31 RX ORDER — ACETAMINOPHEN 650 MG/1
650 SUPPOSITORY RECTAL EVERY 6 HOURS PRN
Status: DISCONTINUED | OUTPATIENT
Start: 2023-07-31 | End: 2023-08-09 | Stop reason: HOSPADM

## 2023-07-31 RX ORDER — ALBUTEROL SULFATE 2.5 MG/3ML
2.5 SOLUTION RESPIRATORY (INHALATION) EVERY 6 HOURS PRN
Status: DISCONTINUED | OUTPATIENT
Start: 2023-07-31 | End: 2023-08-09 | Stop reason: HOSPADM

## 2023-07-31 RX ORDER — ACETAMINOPHEN 325 MG/1
650 TABLET ORAL EVERY 6 HOURS PRN
Status: DISCONTINUED | OUTPATIENT
Start: 2023-07-31 | End: 2023-08-09 | Stop reason: HOSPADM

## 2023-07-31 RX ADMIN — DOFETILIDE 250 MCG: 0.25 CAPSULE ORAL at 21:02

## 2023-07-31 RX ADMIN — CLONIDINE HYDROCHLORIDE 0.1 MG: 0.1 TABLET ORAL at 11:25

## 2023-07-31 RX ADMIN — SODIUM CHLORIDE, PRESERVATIVE FREE 10 ML: 5 INJECTION INTRAVENOUS at 21:04

## 2023-07-31 RX ADMIN — RANOLAZINE 500 MG: 500 TABLET, FILM COATED, EXTENDED RELEASE ORAL at 21:02

## 2023-07-31 RX ADMIN — RIVAROXABAN 15 MG: 15 TABLET, FILM COATED ORAL at 15:26

## 2023-07-31 RX ADMIN — HYDRALAZINE HYDROCHLORIDE 50 MG: 50 TABLET, FILM COATED ORAL at 15:26

## 2023-07-31 RX ADMIN — MOMETASONE FUROATE AND FORMOTEROL FUMARATE DIHYDRATE 2 PUFF: 200; 5 AEROSOL RESPIRATORY (INHALATION) at 20:10

## 2023-07-31 RX ADMIN — Medication 10 ML: at 17:34

## 2023-07-31 RX ADMIN — RANOLAZINE 500 MG: 500 TABLET, FILM COATED, EXTENDED RELEASE ORAL at 15:26

## 2023-07-31 RX ADMIN — FUROSEMIDE 20 MG: 10 INJECTION, SOLUTION INTRAMUSCULAR; INTRAVENOUS at 09:57

## 2023-07-31 RX ADMIN — ISOSORBIDE MONONITRATE 120 MG: 60 TABLET, EXTENDED RELEASE ORAL at 15:26

## 2023-07-31 RX ADMIN — FERROUS SULFATE TAB EC 324 MG (65 MG FE EQUIVALENT) 324 MG: 324 (65 FE) TABLET DELAYED RESPONSE at 21:02

## 2023-07-31 RX ADMIN — ROSUVASTATIN CALCIUM 20 MG: 20 TABLET, FILM COATED ORAL at 15:26

## 2023-07-31 RX ADMIN — FUROSEMIDE 40 MG: 10 INJECTION, SOLUTION INTRAMUSCULAR; INTRAVENOUS at 17:34

## 2023-07-31 RX ADMIN — IPRATROPIUM BROMIDE AND ALBUTEROL SULFATE 3 DOSE: .5; 3 SOLUTION RESPIRATORY (INHALATION) at 10:17

## 2023-07-31 RX ADMIN — DEXAMETHASONE SODIUM PHOSPHATE 4 MG: 4 INJECTION, SOLUTION INTRAMUSCULAR; INTRAVENOUS at 11:21

## 2023-07-31 RX ADMIN — NITROGLYCERIN 1 INCH: 20 OINTMENT TOPICAL at 09:36

## 2023-07-31 RX ADMIN — HYDRALAZINE HYDROCHLORIDE 50 MG: 50 TABLET, FILM COATED ORAL at 21:02

## 2023-07-31 ASSESSMENT — PAIN SCALES - GENERAL
PAINLEVEL_OUTOF10: 0
PAINLEVEL_OUTOF10: 0

## 2023-07-31 ASSESSMENT — PAIN - FUNCTIONAL ASSESSMENT: PAIN_FUNCTIONAL_ASSESSMENT: NONE - DENIES PAIN

## 2023-07-31 NOTE — ED NOTES
Dr. Llanes Apt at bedside at this time. Pt's spouse remains at bedside.      Raul Iniguez [place]  07/31/23 1114

## 2023-07-31 NOTE — ED NOTES
Pt provided a turkey sandwich, madhu crackers and a pop at this time.  [facility] per EDMD.      Migdalia Vasquez RN  07/31/23 6096

## 2023-07-31 NOTE — ED PROVIDER NOTES
[facility] [address] [address]      Pt Name: Garland Escobar  MRN: [id number]  [facility] 1945  Date of evaluation: 7/31/2023  Provider: Lonny Douglas  Chief Complaint   Patient presents with    Shortness of Breath     Pt to ED via [facility] from home c/o SOB, chest tightness, tachycardia, and wheezing. Pt received albuterol and duoneb treatment prior to arrival by EMS. Pt RR 18, , SpO2 98% on 4L nc, and /106 +4 pitting edema present in lower extremities upon arrival. Pt wears 4L nc at baseline at home. This patient is at risk for communicable infection. Therefore, personal protection equipment consisting of a mask and gloves was worn for the exam.    HPI  Garland Escobar is a 69 y.o. female who presents with shortness of breath and wheezing has been present for 2 days. She has been using nebulizer treatments at home without relief. She is on 4 L of oxygen at home but has increased her oxygen requirements. She has had a nonproductive cough. She denies any fevers. She denies any chest pain. She denies any previous history of congestive heart failure. She has a history of hypertension and COPD. She has been wheezing. REVIEW OF SYSTEMS  All systems negative except as noted in the HPI. Reviewed Nurses' notes and concur. No LMP recorded. Patient has had a hysterectomy.     PAST MEDICAL HISTORY  Past Medical History:   Diagnosis Date    AAA (abdominal aortic aneurysm) ([address])     pt states it is 4cm    AAA (abdominal aortic aneurysm) without rupture (HCC) 2/10/2015    Atrial fibrillation (HCC)     CAD (coronary artery disease)     CHF (congestive heart failure) (HCC)     COPD (chronic obstructive pulmonary disease) (HCC)     History of blood clots     Hyperlipidemia     Hypertension        FAMILY HISTORY  Family History   Problem Relation Age of Onset    Heart Disease Father     Hypertension Other

## 2023-07-31 NOTE — ED TRIAGE NOTES
Pt to ED via [facility] from home c/o SOB, chest tightness, tachycardia, and wheezing. Pt received albuterol and duoneb treatment prior to arrival by EMS. Pt RR 18, , SpO2 98% on 4L nc, and /106 +4 pitting edema present in lower extremities upon arrival. Pt wears 4L nc at baseline at home. Dr. Jacki Cowden at bedside at this time. EKG completed at bedside at this time.

## 2023-07-31 NOTE — CARE COORDINATION
Case Management Assessment  Initial Evaluation    Date/Time of Evaluation: 7/31/2023 6:23 PM  Assessment Completed by: SEB Wood    If patient is discharged prior to next notation, then this note serves as note for discharge by case management. Patient Name: Chandu Mullen                   YOB: 1945  Diagnosis: Shortness of breath [R06.02]  Hypoxia [R09.02]  COPD exacerbation ([address]) [J44.1]  Hypertensive urgency [I16.0]  Elevated brain natriuretic peptide (BNP) level [R79.89]  Bilateral lower extremity edema [R60.0]  Acute on chronic respiratory failure ([address]) [J96.20]                   Date / Time: 7/31/2023  9:12 AM    Patient Admission Status: Inpatient   Readmission Risk (Low < 19, Mod (19-27), High > 27): Readmission Risk Score: 35.5    Current PCP: Cara Hawkins MD  PCP verified by CM? Yes    Chart Reviewed: Yes      History Provided by: Patient  Patient Orientation: Alert and Oriented    Patient Cognition: Alert    Hospitalization in the last 30 days (Readmission):  Yes    If yes, Readmission Assessment in CM Navigator will be completed. Advance Directives:      Code Status: Full Code   Patient's Primary Decision Maker is: Legal Next of Kin    Primary Decision Maker: Hopepaige Staley Spouse - [phone]    Secondary Decision Maker: Felibertoayla Teresa Child - [phone]    Discharge Planning:    Patient lives with: Spouse/Significant Other Type of Home: House  Primary Care Giver: Self  Patient Support Systems include: Spouse/Significant Other, Children   Current Financial resources:    Current community resources:    Current services prior to admission: Oxygen Therapy, C-pap, Home Care            Current DME:              Type of Home Care services:  Skilled Therapy    ADLS  Prior functional level: Independent in ADLs/IADLs  Current functional level: Independent in ADLs/IADLs    PT AM-PAC:   /24  OT AM-PAC:   /24    Family can provide assistance at DC:  Yes  Would you like Case Management

## 2023-07-31 NOTE — DISCHARGE INSTRUCTIONS
Extra Heart Failure sites:     https://[employer]. com/publication/?l=550612   --- this is American Heart Association interactive Healthier Living with Heart Failure guidebook. Please click hyperlink or copy / paste link into search bar. Use your mouse to scroll through the pages. Lots of information about weight monitoring, diet tips, activity, meds, etc    HF [place] maxwell  -- this is a free smart phone maxwell available for iPhone and Android download. Use your phone to track sodium / fluid intake, zone tool symptom tracking, weights, medications, etc. Click on this hyperlink  HF [place] [employer]   for QR code for easy download. DASH (Dietary Approach to Stop Hypertension) diet --  SeekAlumni.no -- this diet is a flexible eating plan that promotes heart healthy eating style. Click on hyperlink or copy / paste link into search bar. Lots of low sodium recipes and tips.     CigarRepair.ca  -- more free recipes

## 2023-07-31 NOTE — CARE COORDINATION
07/31/23 1818   Readmission Assessment   Number of Days since last admission? 8-30 days   Previous Disposition Home with Home Health   Who is being Interviewed Patient;Caregiver   What was the patient's/caregiver's perception as to why they think they needed to return back to the hospital? Other (Comment)  (New on set CHF)   Did you visit your Primary Care Physician after you left the hospital, before you returned this time? No   Why weren't you able to visit your PCP? Did not have an appointment   Did you see a specialist, such as Cardiac, Pulmonary, Orthopedic Physician, etc. after you left the hospital? Yes  (Cardiac)   Who advised the patient to return to the hospital? Self-referral   Does the patient report anything that got in the way of taking their medications? No   In our efforts to provide the best possible care to you and others like you, can you think of anything that we could have done to help you after you left the hospital the first time, so that you might not have needed to return so soon?  Other (Comment)  (Nothing)

## 2023-07-31 NOTE — ACP (ADVANCE CARE PLANNING)
Advance Care Planning     Advance Care Planning Activator (Inpatient)  Conversation Note      Date of ACP Conversation: 7/31/2023     Conversation Conducted with: Patient with Decision Making Capacity    ACP Activator: [address] Decision Maker:     Current Designated Health Care Decision Maker:     Primary Decision Maker: Dale Castillo Spouse - [phone]    Secondary Decision Maker: Lisseth Bain - Child - [phone]    Today we documented Decision Maker(s) consistent with Legal Next of Kin hierarchy. Care Preferences    Ventilation: \"If you were in your present state of health and suddenly became very ill and were unable to breathe on your own, what would your preference be about the use of a ventilator (breathing machine) if it were available to you? \"      Would the patient desire the use of ventilator (breathing machine)?: yes    \"If your health worsens and it becomes clear that your chance of recovery is unlikely, what would your preference be about the use of a ventilator (breathing machine) if it were available to you? \"     Would the patient desire the use of ventilator (breathing machine)?: No      Resuscitation  \"CPR works best to restart the heart when there is a sudden event, like a heart attack, in someone who is otherwise healthy. Unfortunately, CPR does not typically restart the heart for people who have serious health conditions or who are very sick. \"    \"In the event your heart stopped as a result of an underlying serious health condition, would you want attempts to be made to restart your heart (answer \"yes\" for attempt to resuscitate) or would you prefer a natural death (answer \"no\" for do not attempt to resuscitate)? \" yes       [x] Yes   [] No   Educated Patient / Nancy Bartlettr regarding differences between Advance Directives and portable DNR orders.     Length of ACP Conversation in minutes:      Conversation Outcomes:  ACP discussion completed    Follow-up

## 2023-07-31 NOTE — ED NOTES
Report called to Joanie Jasso RN   To Room 5838  Cardiac monitor on during transfer  Pt's pain level denies  VSS, Afebrile   IV site is clean, dry and intact, Normal saline locked   Family updated on transfer per patient         Mell Alas RN  07/31/23 6601

## 2023-07-31 NOTE — H&P
V2.0  History and Physical      Name:  Rani Pickens /Age/Sex: 1945  (69 y.o. female)   MRN & CSN:  [id number] & [id number] Encounter Date/Time: 2023 12:41 PM EDT   Location:  [facility]/Y[facility]38 PCP: Mer Busch MD       Hospital Day: 1    Assessment and Plan:   Rani Pickens is a 69 y.o. female with a pmh of COPD on 2-3 L oxygen at baseline who presents with Acute on chronic respiratory failure [facility]    Hospital Problems             Last Modified POA    * (Principal) Acute on chronic respiratory failure ([address]) 2023 Yes       Plan:    Acute on chronic hypercapnic respiratory failure  -On recent discharge within the last month, she was on 2 to 3 L nasal cannula. Patient states she has been on 4 L  -On report, in the ED on presentation she was barely able to speak words.   After DuoNebs she was able to speak sentences  -Tachypnea and hypercapnia in the ED    COPD exacerbation  - steroids  - hold on antibiotics as not having significant sputum production  -Consult pulmonology    Acute on chronic diastolic CHF:  -Per , torsemide was decreased from twice a day to once a day and patient having lower extremity edema and weight gain  -Consult cardiology    Atrial fibrillation: Continue Xarelto and diltiazem  CAD and hypertension: Continue Ranexa, Imdur, hydralazine      Disposition:   Current Living situation: Home  Expected Disposition: Home  Estimated D/C: 3 to 5 days    Diet No diet orders on file   DVT Prophylaxis [] Lovenox, []  Heparin, [] SCDs, [] Ambulation,  [] Eliquis, [x] Xarelto, [] Coumadin   Code Status Prior   Surrogate Decision Maker/ POA      Personally reviewed Lab Studies and Imaging     Discussed management of the case with ED provider who recommended admission to [facility]        Drugs that require monitoring for toxicity include IV diuresis and breathing treatments and the method of monitoring was labs        History from:     patient    History of Present Illness:     Chief

## 2023-08-01 PROBLEM — J81.0 ACUTE PULMONARY EDEMA (HCC): Status: ACTIVE | Noted: 2023-08-01

## 2023-08-01 PROBLEM — R09.02 HYPOXIA: Status: ACTIVE | Noted: 2023-08-01

## 2023-08-01 PROBLEM — R79.89 ELEVATED BRAIN NATRIURETIC PEPTIDE (BNP) LEVEL: Status: ACTIVE | Noted: 2023-08-01

## 2023-08-01 LAB
ANION GAP SERPL CALCULATED.3IONS-SCNC: 7 MMOL/L (ref 3–16)
BASOPHILS # BLD: 0 K/UL (ref 0–0.2)
BASOPHILS NFR BLD: 0.2 %
BUN SERPL-MCNC: 23 MG/DL (ref 7–20)
CALCIUM SERPL-MCNC: 9.1 MG/DL (ref 8.3–10.6)
CHLORIDE SERPL-SCNC: 94 MMOL/L (ref 99–110)
CO2 SERPL-SCNC: 39 MMOL/L (ref 21–32)
CREAT SERPL-MCNC: 1.5 MG/DL (ref 0.6–1.2)
DEPRECATED RDW RBC AUTO: 16.6 % (ref 12.4–15.4)
EOSINOPHIL # BLD: 0 K/UL (ref 0–0.6)
EOSINOPHIL NFR BLD: 0.2 %
GFR SERPLBLD CREATININE-BSD FMLA CKD-EPI: 36 ML/MIN/{1.73_M2}
GLUCOSE SERPL-MCNC: 116 MG/DL (ref 70–99)
HCT VFR BLD AUTO: 28.1 % (ref 36–48)
HGB BLD-MCNC: 9.5 G/DL (ref 12–16)
LYMPHOCYTES # BLD: 0.5 K/UL (ref 1–5.1)
LYMPHOCYTES NFR BLD: 5.4 %
MCH RBC QN AUTO: 30.1 PG (ref 26–34)
MCHC RBC AUTO-ENTMCNC: 33.9 G/DL (ref 31–36)
MCV RBC AUTO: 88.9 FL (ref 80–100)
MONOCYTES # BLD: 0.7 K/UL (ref 0–1.3)
MONOCYTES NFR BLD: 7.7 %
NEUTROPHILS # BLD: 8 K/UL (ref 1.7–7.7)
NEUTROPHILS NFR BLD: 86.5 %
PLATELET # BLD AUTO: 161 K/UL (ref 135–450)
PMV BLD AUTO: 10.2 FL (ref 5–10.5)
POTASSIUM SERPL-SCNC: 4.2 MMOL/L (ref 3.5–5.1)
RBC # BLD AUTO: 3.16 M/UL (ref 4–5.2)
SODIUM SERPL-SCNC: 140 MMOL/L (ref 136–145)
WBC # BLD AUTO: 9.3 K/UL (ref 4–11)

## 2023-08-01 PROCEDURE — 97530 THERAPEUTIC ACTIVITIES: CPT

## 2023-08-01 PROCEDURE — 94760 N-INVAS EAR/PLS OXIMETRY 1: CPT

## 2023-08-01 PROCEDURE — 6360000002 HC RX W HCPCS: Performed by: INTERNAL MEDICINE

## 2023-08-01 PROCEDURE — 2580000003 HC RX 258: Performed by: INTERNAL MEDICINE

## 2023-08-01 PROCEDURE — 2580000003 HC RX 258: Performed by: FAMILY MEDICINE

## 2023-08-01 PROCEDURE — 1200000000 HC SEMI PRIVATE

## 2023-08-01 PROCEDURE — 80048 BASIC METABOLIC PNL TOTAL CA: CPT

## 2023-08-01 PROCEDURE — 97166 OT EVAL MOD COMPLEX 45 MIN: CPT

## 2023-08-01 PROCEDURE — 99223 1ST HOSP IP/OBS HIGH 75: CPT | Performed by: INTERNAL MEDICINE

## 2023-08-01 PROCEDURE — 97162 PT EVAL MOD COMPLEX 30 MIN: CPT

## 2023-08-01 PROCEDURE — 6370000000 HC RX 637 (ALT 250 FOR IP): Performed by: FAMILY MEDICINE

## 2023-08-01 PROCEDURE — 85025 COMPLETE CBC W/AUTO DIFF WBC: CPT

## 2023-08-01 PROCEDURE — 6360000002 HC RX W HCPCS: Performed by: FAMILY MEDICINE

## 2023-08-01 PROCEDURE — 36415 COLL VENOUS BLD VENIPUNCTURE: CPT

## 2023-08-01 PROCEDURE — 6370000000 HC RX 637 (ALT 250 FOR IP): Performed by: INTERNAL MEDICINE

## 2023-08-01 PROCEDURE — 97535 SELF CARE MNGMENT TRAINING: CPT

## 2023-08-01 PROCEDURE — 97116 GAIT TRAINING THERAPY: CPT

## 2023-08-01 PROCEDURE — 2700000000 HC OXYGEN THERAPY PER DAY

## 2023-08-01 PROCEDURE — 94640 AIRWAY INHALATION TREATMENT: CPT

## 2023-08-01 RX ORDER — HYDRALAZINE HYDROCHLORIDE 20 MG/ML
10 INJECTION INTRAMUSCULAR; INTRAVENOUS EVERY 4 HOURS PRN
Status: DISCONTINUED | OUTPATIENT
Start: 2023-08-01 | End: 2023-08-09 | Stop reason: HOSPADM

## 2023-08-01 RX ORDER — FUROSEMIDE 10 MG/ML
80 INJECTION INTRAMUSCULAR; INTRAVENOUS 2 TIMES DAILY
Status: DISCONTINUED | OUTPATIENT
Start: 2023-08-01 | End: 2023-08-01

## 2023-08-01 RX ORDER — AMLODIPINE BESYLATE 10 MG/1
10 TABLET ORAL DAILY
Status: DISCONTINUED | OUTPATIENT
Start: 2023-08-01 | End: 2023-08-09 | Stop reason: HOSPADM

## 2023-08-01 RX ADMIN — MOMETASONE FUROATE AND FORMOTEROL FUMARATE DIHYDRATE 2 PUFF: 200; 5 AEROSOL RESPIRATORY (INHALATION) at 08:19

## 2023-08-01 RX ADMIN — PREDNISONE 40 MG: 20 TABLET ORAL at 09:22

## 2023-08-01 RX ADMIN — SODIUM CHLORIDE, PRESERVATIVE FREE 10 ML: 5 INJECTION INTRAVENOUS at 09:24

## 2023-08-01 RX ADMIN — AMLODIPINE BESYLATE 10 MG: 10 TABLET ORAL at 11:17

## 2023-08-01 RX ADMIN — FERROUS SULFATE TAB EC 324 MG (65 MG FE EQUIVALENT) 324 MG: 324 (65 FE) TABLET DELAYED RESPONSE at 09:23

## 2023-08-01 RX ADMIN — ISOSORBIDE MONONITRATE 120 MG: 60 TABLET, EXTENDED RELEASE ORAL at 09:22

## 2023-08-01 RX ADMIN — RIVAROXABAN 15 MG: 15 TABLET, FILM COATED ORAL at 08:07

## 2023-08-01 RX ADMIN — HYDRALAZINE HYDROCHLORIDE 50 MG: 50 TABLET, FILM COATED ORAL at 20:36

## 2023-08-01 RX ADMIN — ROSUVASTATIN CALCIUM 20 MG: 20 TABLET, FILM COATED ORAL at 09:23

## 2023-08-01 RX ADMIN — PANTOPRAZOLE SODIUM 40 MG: 40 TABLET, DELAYED RELEASE ORAL at 06:34

## 2023-08-01 RX ADMIN — HYDRALAZINE HYDROCHLORIDE 50 MG: 50 TABLET, FILM COATED ORAL at 07:41

## 2023-08-01 RX ADMIN — DOFETILIDE 250 MCG: 0.25 CAPSULE ORAL at 09:21

## 2023-08-01 RX ADMIN — DOFETILIDE 250 MCG: 0.25 CAPSULE ORAL at 20:36

## 2023-08-01 RX ADMIN — RANOLAZINE 500 MG: 500 TABLET, FILM COATED, EXTENDED RELEASE ORAL at 20:36

## 2023-08-01 RX ADMIN — MOMETASONE FUROATE AND FORMOTEROL FUMARATE DIHYDRATE 2 PUFF: 200; 5 AEROSOL RESPIRATORY (INHALATION) at 20:37

## 2023-08-01 RX ADMIN — RANOLAZINE 500 MG: 500 TABLET, FILM COATED, EXTENDED RELEASE ORAL at 09:22

## 2023-08-01 RX ADMIN — HYDRALAZINE HYDROCHLORIDE 50 MG: 50 TABLET, FILM COATED ORAL at 14:37

## 2023-08-01 RX ADMIN — FUROSEMIDE 40 MG: 10 INJECTION, SOLUTION INTRAMUSCULAR; INTRAVENOUS at 09:23

## 2023-08-01 RX ADMIN — FUROSEMIDE 10 MG/HR: 10 INJECTION, SOLUTION INTRAMUSCULAR; INTRAVENOUS at 18:50

## 2023-08-01 RX ADMIN — FERROUS SULFATE TAB EC 324 MG (65 MG FE EQUIVALENT) 324 MG: 324 (65 FE) TABLET DELAYED RESPONSE at 20:36

## 2023-08-01 RX ADMIN — TIOTROPIUM BROMIDE INHALATION SPRAY 2 PUFF: 3.12 SPRAY, METERED RESPIRATORY (INHALATION) at 08:19

## 2023-08-01 ASSESSMENT — PAIN SCALES - GENERAL
PAINLEVEL_OUTOF10: 0

## 2023-08-01 ASSESSMENT — PAIN SCALES - WONG BAKER
WONGBAKER_NUMERICALRESPONSE: 0
WONGBAKER_NUMERICALRESPONSE: 0

## 2023-08-01 NOTE — CARE COORDINATION
SW reached out to MD regarding the need for therapy recommendations. She is active with [facility] and had a recent stay at [facility] in June. We will continue to follow along. Respectfully submitted,    Carla RYAN, [facility]   [phone]    Electronically signed by CONNOR Wallace, LSW on 8/1/2023 at 9:14 AM

## 2023-08-02 PROBLEM — N18.32 STAGE 3B CHRONIC KIDNEY DISEASE (HCC): Status: ACTIVE | Noted: 2023-08-02

## 2023-08-02 PROBLEM — I47.19 ATRIAL TACHYCARDIA: Status: ACTIVE | Noted: 2022-11-07

## 2023-08-02 LAB
ANION GAP SERPL CALCULATED.3IONS-SCNC: 9 MMOL/L (ref 3–16)
BASOPHILS # BLD: 0 K/UL (ref 0–0.2)
BASOPHILS NFR BLD: 0.2 %
BUN SERPL-MCNC: 27 MG/DL (ref 7–20)
CALCIUM SERPL-MCNC: 9 MG/DL (ref 8.3–10.6)
CHLORIDE SERPL-SCNC: 96 MMOL/L (ref 99–110)
CO2 SERPL-SCNC: 38 MMOL/L (ref 21–32)
CREAT SERPL-MCNC: 1.6 MG/DL (ref 0.6–1.2)
DEPRECATED RDW RBC AUTO: 16.9 % (ref 12.4–15.4)
EOSINOPHIL # BLD: 0.1 K/UL (ref 0–0.6)
EOSINOPHIL NFR BLD: 0.8 %
GFR SERPLBLD CREATININE-BSD FMLA CKD-EPI: 33 ML/MIN/{1.73_M2}
GLUCOSE SERPL-MCNC: 104 MG/DL (ref 70–99)
HCT VFR BLD AUTO: 29.1 % (ref 36–48)
HGB BLD-MCNC: 9.5 G/DL (ref 12–16)
LYMPHOCYTES # BLD: 0.9 K/UL (ref 1–5.1)
LYMPHOCYTES NFR BLD: 8.9 %
MAGNESIUM SERPL-MCNC: 2.3 MG/DL (ref 1.8–2.4)
MCH RBC QN AUTO: 29.6 PG (ref 26–34)
MCHC RBC AUTO-ENTMCNC: 32.8 G/DL (ref 31–36)
MCV RBC AUTO: 90.4 FL (ref 80–100)
MONOCYTES # BLD: 1 K/UL (ref 0–1.3)
MONOCYTES NFR BLD: 9.7 %
NEUTROPHILS # BLD: 8.3 K/UL (ref 1.7–7.7)
NEUTROPHILS NFR BLD: 80.4 %
PLATELET # BLD AUTO: 185 K/UL (ref 135–450)
PMV BLD AUTO: 10 FL (ref 5–10.5)
POTASSIUM SERPL-SCNC: 3.6 MMOL/L (ref 3.5–5.1)
RBC # BLD AUTO: 3.22 M/UL (ref 4–5.2)
SODIUM SERPL-SCNC: 143 MMOL/L (ref 136–145)
WBC # BLD AUTO: 10.3 K/UL (ref 4–11)

## 2023-08-02 PROCEDURE — 1200000000 HC SEMI PRIVATE

## 2023-08-02 PROCEDURE — 97530 THERAPEUTIC ACTIVITIES: CPT

## 2023-08-02 PROCEDURE — 99233 SBSQ HOSP IP/OBS HIGH 50: CPT | Performed by: NURSE PRACTITIONER

## 2023-08-02 PROCEDURE — 2580000003 HC RX 258: Performed by: INTERNAL MEDICINE

## 2023-08-02 PROCEDURE — 99233 SBSQ HOSP IP/OBS HIGH 50: CPT | Performed by: INTERNAL MEDICINE

## 2023-08-02 PROCEDURE — 6360000002 HC RX W HCPCS: Performed by: INTERNAL MEDICINE

## 2023-08-02 PROCEDURE — 6370000000 HC RX 637 (ALT 250 FOR IP): Performed by: FAMILY MEDICINE

## 2023-08-02 PROCEDURE — 85025 COMPLETE CBC W/AUTO DIFF WBC: CPT

## 2023-08-02 PROCEDURE — 94640 AIRWAY INHALATION TREATMENT: CPT

## 2023-08-02 PROCEDURE — 36415 COLL VENOUS BLD VENIPUNCTURE: CPT

## 2023-08-02 PROCEDURE — 97535 SELF CARE MNGMENT TRAINING: CPT

## 2023-08-02 PROCEDURE — 83735 ASSAY OF MAGNESIUM: CPT

## 2023-08-02 PROCEDURE — 6370000000 HC RX 637 (ALT 250 FOR IP): Performed by: INTERNAL MEDICINE

## 2023-08-02 PROCEDURE — 80048 BASIC METABOLIC PNL TOTAL CA: CPT

## 2023-08-02 RX ADMIN — FERROUS SULFATE TAB EC 324 MG (65 MG FE EQUIVALENT) 324 MG: 324 (65 FE) TABLET DELAYED RESPONSE at 19:52

## 2023-08-02 RX ADMIN — RANOLAZINE 500 MG: 500 TABLET, FILM COATED, EXTENDED RELEASE ORAL at 08:30

## 2023-08-02 RX ADMIN — MOMETASONE FUROATE AND FORMOTEROL FUMARATE DIHYDRATE 2 PUFF: 200; 5 AEROSOL RESPIRATORY (INHALATION) at 08:08

## 2023-08-02 RX ADMIN — ISOSORBIDE MONONITRATE 120 MG: 60 TABLET, EXTENDED RELEASE ORAL at 08:30

## 2023-08-02 RX ADMIN — FUROSEMIDE 10 MG/HR: 10 INJECTION, SOLUTION INTRAMUSCULAR; INTRAVENOUS at 04:43

## 2023-08-02 RX ADMIN — PANTOPRAZOLE SODIUM 40 MG: 40 TABLET, DELAYED RELEASE ORAL at 06:53

## 2023-08-02 RX ADMIN — FERROUS SULFATE TAB EC 324 MG (65 MG FE EQUIVALENT) 324 MG: 324 (65 FE) TABLET DELAYED RESPONSE at 08:30

## 2023-08-02 RX ADMIN — RIVAROXABAN 15 MG: 15 TABLET, FILM COATED ORAL at 08:30

## 2023-08-02 RX ADMIN — MOMETASONE FUROATE AND FORMOTEROL FUMARATE DIHYDRATE 2 PUFF: 200; 5 AEROSOL RESPIRATORY (INHALATION) at 19:38

## 2023-08-02 RX ADMIN — PREDNISONE 40 MG: 20 TABLET ORAL at 08:30

## 2023-08-02 RX ADMIN — HYDRALAZINE HYDROCHLORIDE 50 MG: 50 TABLET, FILM COATED ORAL at 06:53

## 2023-08-02 RX ADMIN — FUROSEMIDE 10 MG/HR: 10 INJECTION, SOLUTION INTRAMUSCULAR; INTRAVENOUS at 14:15

## 2023-08-02 RX ADMIN — DOFETILIDE 250 MCG: 0.25 CAPSULE ORAL at 08:31

## 2023-08-02 RX ADMIN — ROSUVASTATIN CALCIUM 20 MG: 20 TABLET, FILM COATED ORAL at 08:30

## 2023-08-02 RX ADMIN — DOFETILIDE 250 MCG: 0.25 CAPSULE ORAL at 21:27

## 2023-08-02 RX ADMIN — RANOLAZINE 500 MG: 500 TABLET, FILM COATED, EXTENDED RELEASE ORAL at 19:52

## 2023-08-02 RX ADMIN — TIOTROPIUM BROMIDE INHALATION SPRAY 2 PUFF: 3.12 SPRAY, METERED RESPIRATORY (INHALATION) at 08:08

## 2023-08-02 RX ADMIN — AMLODIPINE BESYLATE 10 MG: 10 TABLET ORAL at 08:31

## 2023-08-02 RX ADMIN — HYDRALAZINE HYDROCHLORIDE 50 MG: 50 TABLET, FILM COATED ORAL at 21:27

## 2023-08-02 RX ADMIN — HYDRALAZINE HYDROCHLORIDE 50 MG: 50 TABLET, FILM COATED ORAL at 14:16

## 2023-08-02 ASSESSMENT — PAIN SCALES - WONG BAKER
WONGBAKER_NUMERICALRESPONSE: 0

## 2023-08-02 ASSESSMENT — PAIN SCALES - GENERAL: PAINLEVEL_OUTOF10: 0

## 2023-08-02 NOTE — NURSE NAVIGATOR
Cardiomems Reading     Unable to transmit reading to [place]  PAD= 33  . Goal = 20  Will obtain reading again tomorrow.

## 2023-08-02 NOTE — CARE COORDINATION
[facility]  Patient is active with [facility] for PT/OT. Will follow for discharge to home with orders to resume care.       Adrianna Dacosta RN, BSN CTN  [facility] [phone]

## 2023-08-03 LAB
ANION GAP SERPL CALCULATED.3IONS-SCNC: 8 MMOL/L (ref 3–16)
BASOPHILS # BLD: 0 K/UL (ref 0–0.2)
BASOPHILS NFR BLD: 0.2 %
BUN SERPL-MCNC: 36 MG/DL (ref 7–20)
CALCIUM SERPL-MCNC: 9.1 MG/DL (ref 8.3–10.6)
CHLORIDE SERPL-SCNC: 94 MMOL/L (ref 99–110)
CO2 SERPL-SCNC: 39 MMOL/L (ref 21–32)
CREAT SERPL-MCNC: 1.6 MG/DL (ref 0.6–1.2)
DEPRECATED RDW RBC AUTO: 16.8 % (ref 12.4–15.4)
EOSINOPHIL # BLD: 0 K/UL (ref 0–0.6)
EOSINOPHIL NFR BLD: 0.4 %
GFR SERPLBLD CREATININE-BSD FMLA CKD-EPI: 33 ML/MIN/{1.73_M2}
GLUCOSE SERPL-MCNC: 112 MG/DL (ref 70–99)
HCT VFR BLD AUTO: 30.3 % (ref 36–48)
HGB BLD-MCNC: 9.9 G/DL (ref 12–16)
LV EF: 58 %
LVEF MODALITY: NORMAL
LYMPHOCYTES # BLD: 0.9 K/UL (ref 1–5.1)
LYMPHOCYTES NFR BLD: 6.5 %
MCH RBC QN AUTO: 29.5 PG (ref 26–34)
MCHC RBC AUTO-ENTMCNC: 32.7 G/DL (ref 31–36)
MCV RBC AUTO: 90.1 FL (ref 80–100)
MONOCYTES # BLD: 1.1 K/UL (ref 0–1.3)
MONOCYTES NFR BLD: 8.2 %
NEUTROPHILS # BLD: 11.1 K/UL (ref 1.7–7.7)
NEUTROPHILS NFR BLD: 84.7 %
PLATELET # BLD AUTO: 203 K/UL (ref 135–450)
PMV BLD AUTO: 9.7 FL (ref 5–10.5)
POTASSIUM SERPL-SCNC: 4.2 MMOL/L (ref 3.5–5.1)
RBC # BLD AUTO: 3.36 M/UL (ref 4–5.2)
SODIUM SERPL-SCNC: 141 MMOL/L (ref 136–145)
WBC # BLD AUTO: 13.1 K/UL (ref 4–11)

## 2023-08-03 PROCEDURE — 97530 THERAPEUTIC ACTIVITIES: CPT

## 2023-08-03 PROCEDURE — 2580000003 HC RX 258: Performed by: INTERNAL MEDICINE

## 2023-08-03 PROCEDURE — 94761 N-INVAS EAR/PLS OXIMETRY MLT: CPT

## 2023-08-03 PROCEDURE — 36415 COLL VENOUS BLD VENIPUNCTURE: CPT

## 2023-08-03 PROCEDURE — 6370000000 HC RX 637 (ALT 250 FOR IP): Performed by: FAMILY MEDICINE

## 2023-08-03 PROCEDURE — 1200000000 HC SEMI PRIVATE

## 2023-08-03 PROCEDURE — 99233 SBSQ HOSP IP/OBS HIGH 50: CPT | Performed by: INTERNAL MEDICINE

## 2023-08-03 PROCEDURE — 6370000000 HC RX 637 (ALT 250 FOR IP): Performed by: INTERNAL MEDICINE

## 2023-08-03 PROCEDURE — 99233 SBSQ HOSP IP/OBS HIGH 50: CPT | Performed by: NURSE PRACTITIONER

## 2023-08-03 PROCEDURE — 93306 TTE W/DOPPLER COMPLETE: CPT

## 2023-08-03 PROCEDURE — 85025 COMPLETE CBC W/AUTO DIFF WBC: CPT

## 2023-08-03 PROCEDURE — 6360000002 HC RX W HCPCS: Performed by: INTERNAL MEDICINE

## 2023-08-03 PROCEDURE — 80048 BASIC METABOLIC PNL TOTAL CA: CPT

## 2023-08-03 PROCEDURE — 2700000000 HC OXYGEN THERAPY PER DAY

## 2023-08-03 PROCEDURE — 94640 AIRWAY INHALATION TREATMENT: CPT

## 2023-08-03 PROCEDURE — 97116 GAIT TRAINING THERAPY: CPT

## 2023-08-03 PROCEDURE — 92610 EVALUATE SWALLOWING FUNCTION: CPT

## 2023-08-03 PROCEDURE — 6360000002 HC RX W HCPCS: Performed by: FAMILY MEDICINE

## 2023-08-03 RX ORDER — SODIUM CHLORIDE FOR INHALATION 0.9 %
3 VIAL, NEBULIZER (ML) INHALATION
Status: DISCONTINUED | OUTPATIENT
Start: 2023-08-03 | End: 2023-08-09 | Stop reason: HOSPADM

## 2023-08-03 RX ORDER — LEVALBUTEROL 1.25 MG/.5ML
1.25 SOLUTION, CONCENTRATE RESPIRATORY (INHALATION)
Status: DISCONTINUED | OUTPATIENT
Start: 2023-08-03 | End: 2023-08-09 | Stop reason: HOSPADM

## 2023-08-03 RX ADMIN — TIOTROPIUM BROMIDE INHALATION SPRAY 2 PUFF: 3.12 SPRAY, METERED RESPIRATORY (INHALATION) at 07:32

## 2023-08-03 RX ADMIN — HYDRALAZINE HYDROCHLORIDE 50 MG: 50 TABLET, FILM COATED ORAL at 15:20

## 2023-08-03 RX ADMIN — FERROUS SULFATE TAB EC 324 MG (65 MG FE EQUIVALENT) 324 MG: 324 (65 FE) TABLET DELAYED RESPONSE at 20:28

## 2023-08-03 RX ADMIN — FUROSEMIDE 10 MG/HR: 10 INJECTION, SOLUTION INTRAMUSCULAR; INTRAVENOUS at 09:51

## 2023-08-03 RX ADMIN — FERROUS SULFATE TAB EC 324 MG (65 MG FE EQUIVALENT) 324 MG: 324 (65 FE) TABLET DELAYED RESPONSE at 08:21

## 2023-08-03 RX ADMIN — ISOSORBIDE MONONITRATE 120 MG: 60 TABLET, EXTENDED RELEASE ORAL at 08:21

## 2023-08-03 RX ADMIN — ISODIUM CHLORIDE 3 ML: 0.03 SOLUTION RESPIRATORY (INHALATION) at 16:06

## 2023-08-03 RX ADMIN — ALBUTEROL SULFATE 2.5 MG: 2.5 SOLUTION RESPIRATORY (INHALATION) at 06:28

## 2023-08-03 RX ADMIN — LEVALBUTEROL 1.25 MG: 1.25 SOLUTION, CONCENTRATE RESPIRATORY (INHALATION) at 19:15

## 2023-08-03 RX ADMIN — PANTOPRAZOLE SODIUM 40 MG: 40 TABLET, DELAYED RELEASE ORAL at 05:45

## 2023-08-03 RX ADMIN — RANOLAZINE 500 MG: 500 TABLET, FILM COATED, EXTENDED RELEASE ORAL at 20:28

## 2023-08-03 RX ADMIN — ROSUVASTATIN CALCIUM 20 MG: 20 TABLET, FILM COATED ORAL at 08:21

## 2023-08-03 RX ADMIN — LEVALBUTEROL 1.25 MG: 1.25 SOLUTION, CONCENTRATE RESPIRATORY (INHALATION) at 11:44

## 2023-08-03 RX ADMIN — FUROSEMIDE 10 MG/HR: 10 INJECTION, SOLUTION INTRAMUSCULAR; INTRAVENOUS at 20:30

## 2023-08-03 RX ADMIN — DOFETILIDE 250 MCG: 0.25 CAPSULE ORAL at 21:06

## 2023-08-03 RX ADMIN — MOMETASONE FUROATE AND FORMOTEROL FUMARATE DIHYDRATE 2 PUFF: 200; 5 AEROSOL RESPIRATORY (INHALATION) at 19:15

## 2023-08-03 RX ADMIN — HYDRALAZINE HYDROCHLORIDE 50 MG: 50 TABLET, FILM COATED ORAL at 20:28

## 2023-08-03 RX ADMIN — RANOLAZINE 500 MG: 500 TABLET, FILM COATED, EXTENDED RELEASE ORAL at 08:21

## 2023-08-03 RX ADMIN — AMLODIPINE BESYLATE 10 MG: 10 TABLET ORAL at 08:21

## 2023-08-03 RX ADMIN — ISODIUM CHLORIDE 3 ML: 0.03 SOLUTION RESPIRATORY (INHALATION) at 11:44

## 2023-08-03 RX ADMIN — LEVALBUTEROL 1.25 MG: 1.25 SOLUTION, CONCENTRATE RESPIRATORY (INHALATION) at 16:05

## 2023-08-03 RX ADMIN — HYDRALAZINE HYDROCHLORIDE 50 MG: 50 TABLET, FILM COATED ORAL at 05:45

## 2023-08-03 RX ADMIN — ALBUTEROL SULFATE 2.5 MG: 2.5 SOLUTION RESPIRATORY (INHALATION) at 07:32

## 2023-08-03 RX ADMIN — PREDNISONE 40 MG: 20 TABLET ORAL at 08:21

## 2023-08-03 RX ADMIN — DOFETILIDE 250 MCG: 0.25 CAPSULE ORAL at 08:21

## 2023-08-03 RX ADMIN — RIVAROXABAN 15 MG: 15 TABLET, FILM COATED ORAL at 08:21

## 2023-08-03 RX ADMIN — FUROSEMIDE 10 MG/HR: 10 INJECTION, SOLUTION INTRAMUSCULAR; INTRAVENOUS at 00:16

## 2023-08-03 RX ADMIN — ISODIUM CHLORIDE 3 ML: 0.03 SOLUTION RESPIRATORY (INHALATION) at 19:16

## 2023-08-03 RX ADMIN — MOMETASONE FUROATE AND FORMOTEROL FUMARATE DIHYDRATE 2 PUFF: 200; 5 AEROSOL RESPIRATORY (INHALATION) at 07:32

## 2023-08-03 ASSESSMENT — PAIN SCALES - WONG BAKER
WONGBAKER_NUMERICALRESPONSE: 0

## 2023-08-03 ASSESSMENT — PAIN SCALES - GENERAL: PAINLEVEL_OUTOF10: 0

## 2023-08-04 ENCOUNTER — APPOINTMENT (OUTPATIENT)
Dept: GENERAL RADIOLOGY | Age: 78
DRG: 291 | End: 2023-08-04
Payer: MEDICARE

## 2023-08-04 LAB
ALBUMIN SERPL-MCNC: 4 G/DL (ref 3.4–5)
ANION GAP SERPL CALCULATED.3IONS-SCNC: 10 MMOL/L (ref 3–16)
ANION GAP SERPL CALCULATED.3IONS-SCNC: 13 MMOL/L (ref 3–16)
BACTERIA BLD CULT ORG #2: NORMAL
BACTERIA BLD CULT: NORMAL
BASE EXCESS BLDA CALC-SCNC: 17.2 MMOL/L (ref -3–3)
BASOPHILS # BLD: 0 K/UL (ref 0–0.2)
BASOPHILS NFR BLD: 0.2 %
BUN SERPL-MCNC: 36 MG/DL (ref 7–20)
BUN SERPL-MCNC: 37 MG/DL (ref 7–20)
CALCIUM SERPL-MCNC: 9.1 MG/DL (ref 8.3–10.6)
CALCIUM SERPL-MCNC: 9.3 MG/DL (ref 8.3–10.6)
CHLORIDE SERPL-SCNC: 91 MMOL/L (ref 99–110)
CHLORIDE SERPL-SCNC: 91 MMOL/L (ref 99–110)
CO2 BLDA-SCNC: 45.5 MMOL/L
CO2 SERPL-SCNC: 39 MMOL/L (ref 21–32)
CO2 SERPL-SCNC: 42 MMOL/L (ref 21–32)
COHGB MFR BLDA: 1.9 % (ref 0–1.5)
CREAT SERPL-MCNC: 1.5 MG/DL (ref 0.6–1.2)
CREAT SERPL-MCNC: 1.5 MG/DL (ref 0.6–1.2)
DEPRECATED RDW RBC AUTO: 16.5 % (ref 12.4–15.4)
EOSINOPHIL # BLD: 0.1 K/UL (ref 0–0.6)
EOSINOPHIL NFR BLD: 0.7 %
GFR SERPLBLD CREATININE-BSD FMLA CKD-EPI: 36 ML/MIN/{1.73_M2}
GFR SERPLBLD CREATININE-BSD FMLA CKD-EPI: 36 ML/MIN/{1.73_M2}
GLUCOSE SERPL-MCNC: 115 MG/DL (ref 70–99)
GLUCOSE SERPL-MCNC: 116 MG/DL (ref 70–99)
HCO3 BLDA-SCNC: 43.6 MMOL/L (ref 21–29)
HCT VFR BLD AUTO: 32.7 % (ref 36–48)
HGB BLD-MCNC: 10.3 G/DL (ref 12–16)
HGB BLDA-MCNC: 10.3 G/DL (ref 12–16)
LYMPHOCYTES # BLD: 1.2 K/UL (ref 1–5.1)
LYMPHOCYTES NFR BLD: 7.3 %
MAGNESIUM SERPL-MCNC: 2.1 MG/DL (ref 1.8–2.4)
MCH RBC QN AUTO: 28.4 PG (ref 26–34)
MCHC RBC AUTO-ENTMCNC: 31.5 G/DL (ref 31–36)
MCV RBC AUTO: 90 FL (ref 80–100)
METHGB MFR BLDA: 0.1 %
MONOCYTES # BLD: 1.4 K/UL (ref 0–1.3)
MONOCYTES NFR BLD: 8.7 %
NEUTROPHILS # BLD: 13.7 K/UL (ref 1.7–7.7)
NEUTROPHILS NFR BLD: 83.1 %
O2 THERAPY: ABNORMAL
PCO2 BLDA: 61.6 MMHG (ref 35–45)
PH BLDA: 7.46 [PH] (ref 7.35–7.45)
PHOSPHATE SERPL-MCNC: 3.7 MG/DL (ref 2.5–4.9)
PLATELET # BLD AUTO: 239 K/UL (ref 135–450)
PMV BLD AUTO: 10 FL (ref 5–10.5)
PO2 BLDA: 151 MMHG (ref 75–108)
POTASSIUM SERPL-SCNC: 2.9 MMOL/L (ref 3.5–5.1)
POTASSIUM SERPL-SCNC: 2.9 MMOL/L (ref 3.5–5.1)
RBC # BLD AUTO: 3.63 M/UL (ref 4–5.2)
SAO2 % BLDA: 99.9 %
SODIUM SERPL-SCNC: 143 MMOL/L (ref 136–145)
SODIUM SERPL-SCNC: 143 MMOL/L (ref 136–145)
WBC # BLD AUTO: 16.4 K/UL (ref 4–11)

## 2023-08-04 PROCEDURE — 94660 CPAP INITIATION&MGMT: CPT

## 2023-08-04 PROCEDURE — 94640 AIRWAY INHALATION TREATMENT: CPT

## 2023-08-04 PROCEDURE — 6360000002 HC RX W HCPCS: Performed by: INTERNAL MEDICINE

## 2023-08-04 PROCEDURE — 6370000000 HC RX 637 (ALT 250 FOR IP): Performed by: FAMILY MEDICINE

## 2023-08-04 PROCEDURE — 99233 SBSQ HOSP IP/OBS HIGH 50: CPT | Performed by: INTERNAL MEDICINE

## 2023-08-04 PROCEDURE — 6370000000 HC RX 637 (ALT 250 FOR IP): Performed by: INTERNAL MEDICINE

## 2023-08-04 PROCEDURE — 36415 COLL VENOUS BLD VENIPUNCTURE: CPT

## 2023-08-04 PROCEDURE — 2700000000 HC OXYGEN THERAPY PER DAY

## 2023-08-04 PROCEDURE — 6370000000 HC RX 637 (ALT 250 FOR IP): Performed by: NURSE PRACTITIONER

## 2023-08-04 PROCEDURE — 5A09357 ASSISTANCE WITH RESPIRATORY VENTILATION, LESS THAN 24 CONSECUTIVE HOURS, CONTINUOUS POSITIVE AIRWAY PRESSURE: ICD-10-PCS | Performed by: HOSPITALIST

## 2023-08-04 PROCEDURE — 36600 WITHDRAWAL OF ARTERIAL BLOOD: CPT

## 2023-08-04 PROCEDURE — 85025 COMPLETE CBC W/AUTO DIFF WBC: CPT

## 2023-08-04 PROCEDURE — 83735 ASSAY OF MAGNESIUM: CPT

## 2023-08-04 PROCEDURE — 1200000000 HC SEMI PRIVATE

## 2023-08-04 PROCEDURE — 80069 RENAL FUNCTION PANEL: CPT

## 2023-08-04 PROCEDURE — 82803 BLOOD GASES ANY COMBINATION: CPT

## 2023-08-04 PROCEDURE — 71045 X-RAY EXAM CHEST 1 VIEW: CPT

## 2023-08-04 PROCEDURE — 2580000003 HC RX 258: Performed by: INTERNAL MEDICINE

## 2023-08-04 PROCEDURE — 94760 N-INVAS EAR/PLS OXIMETRY 1: CPT

## 2023-08-04 PROCEDURE — 92526 ORAL FUNCTION THERAPY: CPT

## 2023-08-04 RX ORDER — POTASSIUM CHLORIDE 7.45 MG/ML
10 INJECTION INTRAVENOUS PRN
Status: DISCONTINUED | OUTPATIENT
Start: 2023-08-04 | End: 2023-08-09 | Stop reason: HOSPADM

## 2023-08-04 RX ORDER — ARFORMOTEROL TARTRATE 15 UG/2ML
15 SOLUTION RESPIRATORY (INHALATION)
Status: DISCONTINUED | OUTPATIENT
Start: 2023-08-04 | End: 2023-08-09 | Stop reason: HOSPADM

## 2023-08-04 RX ORDER — BUDESONIDE 0.5 MG/2ML
0.5 INHALANT ORAL
Status: DISCONTINUED | OUTPATIENT
Start: 2023-08-04 | End: 2023-08-09 | Stop reason: HOSPADM

## 2023-08-04 RX ORDER — CARVEDILOL 6.25 MG/1
6.25 TABLET ORAL 2 TIMES DAILY WITH MEALS
Status: DISCONTINUED | OUTPATIENT
Start: 2023-08-04 | End: 2023-08-09 | Stop reason: HOSPADM

## 2023-08-04 RX ORDER — POTASSIUM CHLORIDE 750 MG/1
40 TABLET, FILM COATED, EXTENDED RELEASE ORAL EVERY 4 HOURS
Status: COMPLETED | OUTPATIENT
Start: 2023-08-04 | End: 2023-08-04

## 2023-08-04 RX ORDER — POTASSIUM CHLORIDE 20 MEQ/1
40 TABLET, EXTENDED RELEASE ORAL PRN
Status: DISCONTINUED | OUTPATIENT
Start: 2023-08-04 | End: 2023-08-09 | Stop reason: HOSPADM

## 2023-08-04 RX ADMIN — LEVALBUTEROL 1.25 MG: 1.25 SOLUTION, CONCENTRATE RESPIRATORY (INHALATION) at 11:35

## 2023-08-04 RX ADMIN — FUROSEMIDE 10 MG/HR: 10 INJECTION, SOLUTION INTRAMUSCULAR; INTRAVENOUS at 19:02

## 2023-08-04 RX ADMIN — FERROUS SULFATE TAB EC 324 MG (65 MG FE EQUIVALENT) 324 MG: 324 (65 FE) TABLET DELAYED RESPONSE at 20:59

## 2023-08-04 RX ADMIN — RIVAROXABAN 15 MG: 15 TABLET, FILM COATED ORAL at 09:04

## 2023-08-04 RX ADMIN — POTASSIUM CHLORIDE 40 MEQ: 750 TABLET, FILM COATED, EXTENDED RELEASE ORAL at 15:15

## 2023-08-04 RX ADMIN — RANOLAZINE 500 MG: 500 TABLET, FILM COATED, EXTENDED RELEASE ORAL at 09:04

## 2023-08-04 RX ADMIN — POTASSIUM CHLORIDE 40 MEQ: 750 TABLET, FILM COATED, EXTENDED RELEASE ORAL at 18:04

## 2023-08-04 RX ADMIN — CARVEDILOL 6.25 MG: 6.25 TABLET, FILM COATED ORAL at 18:04

## 2023-08-04 RX ADMIN — AMLODIPINE BESYLATE 10 MG: 10 TABLET ORAL at 09:04

## 2023-08-04 RX ADMIN — HYDRALAZINE HYDROCHLORIDE 50 MG: 50 TABLET, FILM COATED ORAL at 20:59

## 2023-08-04 RX ADMIN — BUDESONIDE 500 MCG: 0.5 SUSPENSION RESPIRATORY (INHALATION) at 19:33

## 2023-08-04 RX ADMIN — LEVALBUTEROL 1.25 MG: 1.25 SOLUTION, CONCENTRATE RESPIRATORY (INHALATION) at 08:14

## 2023-08-04 RX ADMIN — POTASSIUM CHLORIDE 40 MEQ: 750 TABLET, FILM COATED, EXTENDED RELEASE ORAL at 10:56

## 2023-08-04 RX ADMIN — MOMETASONE FUROATE AND FORMOTEROL FUMARATE DIHYDRATE 2 PUFF: 200; 5 AEROSOL RESPIRATORY (INHALATION) at 08:14

## 2023-08-04 RX ADMIN — TIOTROPIUM BROMIDE INHALATION SPRAY 2 PUFF: 3.12 SPRAY, METERED RESPIRATORY (INHALATION) at 08:14

## 2023-08-04 RX ADMIN — FUROSEMIDE 10 MG/HR: 10 INJECTION, SOLUTION INTRAMUSCULAR; INTRAVENOUS at 09:04

## 2023-08-04 RX ADMIN — LEVALBUTEROL 1.25 MG: 1.25 SOLUTION, CONCENTRATE RESPIRATORY (INHALATION) at 19:33

## 2023-08-04 RX ADMIN — FERROUS SULFATE TAB EC 324 MG (65 MG FE EQUIVALENT) 324 MG: 324 (65 FE) TABLET DELAYED RESPONSE at 09:04

## 2023-08-04 RX ADMIN — DOFETILIDE 250 MCG: 0.25 CAPSULE ORAL at 20:59

## 2023-08-04 RX ADMIN — METHYLPREDNISOLONE SODIUM SUCCINATE 40 MG: 40 INJECTION, POWDER, FOR SOLUTION INTRAMUSCULAR; INTRAVENOUS at 20:59

## 2023-08-04 RX ADMIN — PANTOPRAZOLE SODIUM 40 MG: 40 TABLET, DELAYED RELEASE ORAL at 05:54

## 2023-08-04 RX ADMIN — ISODIUM CHLORIDE 3 ML: 0.03 SOLUTION RESPIRATORY (INHALATION) at 16:59

## 2023-08-04 RX ADMIN — ISODIUM CHLORIDE 3 ML: 0.03 SOLUTION RESPIRATORY (INHALATION) at 08:14

## 2023-08-04 RX ADMIN — ISODIUM CHLORIDE 3 ML: 0.03 SOLUTION RESPIRATORY (INHALATION) at 11:35

## 2023-08-04 RX ADMIN — METHYLPREDNISOLONE SODIUM SUCCINATE 40 MG: 40 INJECTION, POWDER, FOR SOLUTION INTRAMUSCULAR; INTRAVENOUS at 10:55

## 2023-08-04 RX ADMIN — DOFETILIDE 250 MCG: 0.25 CAPSULE ORAL at 09:04

## 2023-08-04 RX ADMIN — ROSUVASTATIN CALCIUM 20 MG: 20 TABLET, FILM COATED ORAL at 09:04

## 2023-08-04 RX ADMIN — RANOLAZINE 500 MG: 500 TABLET, FILM COATED, EXTENDED RELEASE ORAL at 20:59

## 2023-08-04 RX ADMIN — HYDRALAZINE HYDROCHLORIDE 50 MG: 50 TABLET, FILM COATED ORAL at 15:15

## 2023-08-04 RX ADMIN — ISOSORBIDE MONONITRATE 120 MG: 60 TABLET, EXTENDED RELEASE ORAL at 09:04

## 2023-08-04 RX ADMIN — ISODIUM CHLORIDE 3 ML: 0.03 SOLUTION RESPIRATORY (INHALATION) at 19:33

## 2023-08-04 RX ADMIN — HYDRALAZINE HYDROCHLORIDE 50 MG: 50 TABLET, FILM COATED ORAL at 05:54

## 2023-08-04 RX ADMIN — LEVALBUTEROL 1.25 MG: 1.25 SOLUTION, CONCENTRATE RESPIRATORY (INHALATION) at 16:59

## 2023-08-04 RX ADMIN — ARFORMOTEROL TARTRATE 15 MCG: 15 SOLUTION RESPIRATORY (INHALATION) at 19:33

## 2023-08-04 ASSESSMENT — PAIN SCALES - WONG BAKER
WONGBAKER_NUMERICALRESPONSE: 0

## 2023-08-04 ASSESSMENT — PAIN SCALES - GENERAL: PAINLEVEL_OUTOF10: 0

## 2023-08-05 LAB
ANION GAP SERPL CALCULATED.3IONS-SCNC: 11 MMOL/L (ref 3–16)
BASOPHILS # BLD: 0 K/UL (ref 0–0.2)
BASOPHILS NFR BLD: 0.1 %
BUN SERPL-MCNC: 39 MG/DL (ref 7–20)
CALCIUM SERPL-MCNC: 9 MG/DL (ref 8.3–10.6)
CHLORIDE SERPL-SCNC: 92 MMOL/L (ref 99–110)
CO2 SERPL-SCNC: 40 MMOL/L (ref 21–32)
CREAT SERPL-MCNC: 1.3 MG/DL (ref 0.6–1.2)
DEPRECATED RDW RBC AUTO: 16.9 % (ref 12.4–15.4)
EOSINOPHIL # BLD: 0 K/UL (ref 0–0.6)
EOSINOPHIL NFR BLD: 0 %
GFR SERPLBLD CREATININE-BSD FMLA CKD-EPI: 42 ML/MIN/{1.73_M2}
GLUCOSE SERPL-MCNC: 157 MG/DL (ref 70–99)
HCT VFR BLD AUTO: 29.1 % (ref 36–48)
HGB BLD-MCNC: 9.7 G/DL (ref 12–16)
LYMPHOCYTES # BLD: 0.4 K/UL (ref 1–5.1)
LYMPHOCYTES NFR BLD: 4.3 %
MCH RBC QN AUTO: 29.8 PG (ref 26–34)
MCHC RBC AUTO-ENTMCNC: 33.3 G/DL (ref 31–36)
MCV RBC AUTO: 89.6 FL (ref 80–100)
MONOCYTES # BLD: 0.6 K/UL (ref 0–1.3)
MONOCYTES NFR BLD: 6 %
NEUTROPHILS # BLD: 8.4 K/UL (ref 1.7–7.7)
NEUTROPHILS NFR BLD: 89.6 %
PLATELET # BLD AUTO: 191 K/UL (ref 135–450)
PMV BLD AUTO: 10 FL (ref 5–10.5)
POTASSIUM SERPL-SCNC: 4.1 MMOL/L (ref 3.5–5.1)
RBC # BLD AUTO: 3.25 M/UL (ref 4–5.2)
SODIUM SERPL-SCNC: 143 MMOL/L (ref 136–145)
WBC # BLD AUTO: 9.4 K/UL (ref 4–11)

## 2023-08-05 PROCEDURE — 2580000003 HC RX 258: Performed by: INTERNAL MEDICINE

## 2023-08-05 PROCEDURE — 6370000000 HC RX 637 (ALT 250 FOR IP): Performed by: NURSE PRACTITIONER

## 2023-08-05 PROCEDURE — 6370000000 HC RX 637 (ALT 250 FOR IP): Performed by: FAMILY MEDICINE

## 2023-08-05 PROCEDURE — 6370000000 HC RX 637 (ALT 250 FOR IP): Performed by: INTERNAL MEDICINE

## 2023-08-05 PROCEDURE — 6360000002 HC RX W HCPCS: Performed by: INTERNAL MEDICINE

## 2023-08-05 PROCEDURE — 1200000000 HC SEMI PRIVATE

## 2023-08-05 PROCEDURE — 99232 SBSQ HOSP IP/OBS MODERATE 35: CPT | Performed by: INTERNAL MEDICINE

## 2023-08-05 PROCEDURE — 94640 AIRWAY INHALATION TREATMENT: CPT

## 2023-08-05 PROCEDURE — 36415 COLL VENOUS BLD VENIPUNCTURE: CPT

## 2023-08-05 PROCEDURE — 94761 N-INVAS EAR/PLS OXIMETRY MLT: CPT

## 2023-08-05 PROCEDURE — 99233 SBSQ HOSP IP/OBS HIGH 50: CPT | Performed by: NURSE PRACTITIONER

## 2023-08-05 PROCEDURE — 2700000000 HC OXYGEN THERAPY PER DAY

## 2023-08-05 PROCEDURE — 2580000003 HC RX 258: Performed by: FAMILY MEDICINE

## 2023-08-05 PROCEDURE — 80048 BASIC METABOLIC PNL TOTAL CA: CPT

## 2023-08-05 PROCEDURE — 85025 COMPLETE CBC W/AUTO DIFF WBC: CPT

## 2023-08-05 PROCEDURE — 94660 CPAP INITIATION&MGMT: CPT

## 2023-08-05 RX ORDER — METOLAZONE 5 MG/1
2.5 TABLET ORAL ONCE
Status: COMPLETED | OUTPATIENT
Start: 2023-08-05 | End: 2023-08-05

## 2023-08-05 RX ORDER — ACETAZOLAMIDE 500 MG/5ML
250 INJECTION, POWDER, LYOPHILIZED, FOR SOLUTION INTRAVENOUS EVERY 12 HOURS
Status: COMPLETED | OUTPATIENT
Start: 2023-08-05 | End: 2023-08-06

## 2023-08-05 RX ADMIN — CARVEDILOL 6.25 MG: 6.25 TABLET, FILM COATED ORAL at 18:07

## 2023-08-05 RX ADMIN — LEVALBUTEROL 1.25 MG: 1.25 SOLUTION, CONCENTRATE RESPIRATORY (INHALATION) at 15:53

## 2023-08-05 RX ADMIN — AMLODIPINE BESYLATE 10 MG: 10 TABLET ORAL at 09:38

## 2023-08-05 RX ADMIN — LEVALBUTEROL 1.25 MG: 1.25 SOLUTION, CONCENTRATE RESPIRATORY (INHALATION) at 08:09

## 2023-08-05 RX ADMIN — ARFORMOTEROL TARTRATE 15 MCG: 15 SOLUTION RESPIRATORY (INHALATION) at 19:56

## 2023-08-05 RX ADMIN — PANTOPRAZOLE SODIUM 40 MG: 40 TABLET, DELAYED RELEASE ORAL at 06:44

## 2023-08-05 RX ADMIN — HYDRALAZINE HYDROCHLORIDE 50 MG: 50 TABLET, FILM COATED ORAL at 06:44

## 2023-08-05 RX ADMIN — METOLAZONE 2.5 MG: 5 TABLET ORAL at 11:53

## 2023-08-05 RX ADMIN — ISODIUM CHLORIDE 3 ML: 0.03 SOLUTION RESPIRATORY (INHALATION) at 19:56

## 2023-08-05 RX ADMIN — FUROSEMIDE 10 MG/HR: 10 INJECTION, SOLUTION INTRAMUSCULAR; INTRAVENOUS at 17:09

## 2023-08-05 RX ADMIN — ROSUVASTATIN CALCIUM 20 MG: 20 TABLET, FILM COATED ORAL at 09:38

## 2023-08-05 RX ADMIN — ISODIUM CHLORIDE 3 ML: 0.03 SOLUTION RESPIRATORY (INHALATION) at 08:09

## 2023-08-05 RX ADMIN — SODIUM CHLORIDE, PRESERVATIVE FREE 10 ML: 5 INJECTION INTRAVENOUS at 21:20

## 2023-08-05 RX ADMIN — HYDRALAZINE HYDROCHLORIDE 50 MG: 50 TABLET, FILM COATED ORAL at 15:31

## 2023-08-05 RX ADMIN — DOFETILIDE 250 MCG: 0.25 CAPSULE ORAL at 21:16

## 2023-08-05 RX ADMIN — METHYLPREDNISOLONE SODIUM SUCCINATE 40 MG: 40 INJECTION, POWDER, FOR SOLUTION INTRAMUSCULAR; INTRAVENOUS at 09:41

## 2023-08-05 RX ADMIN — LEVALBUTEROL 1.25 MG: 1.25 SOLUTION, CONCENTRATE RESPIRATORY (INHALATION) at 19:56

## 2023-08-05 RX ADMIN — CARVEDILOL 6.25 MG: 6.25 TABLET, FILM COATED ORAL at 09:38

## 2023-08-05 RX ADMIN — HYDRALAZINE HYDROCHLORIDE 50 MG: 50 TABLET, FILM COATED ORAL at 21:16

## 2023-08-05 RX ADMIN — ACETAZOLAMIDE 250 MG: 500 INJECTION, POWDER, LYOPHILIZED, FOR SOLUTION INTRAVENOUS at 16:20

## 2023-08-05 RX ADMIN — FERROUS SULFATE TAB EC 324 MG (65 MG FE EQUIVALENT) 324 MG: 324 (65 FE) TABLET DELAYED RESPONSE at 21:16

## 2023-08-05 RX ADMIN — RANOLAZINE 500 MG: 500 TABLET, FILM COATED, EXTENDED RELEASE ORAL at 09:38

## 2023-08-05 RX ADMIN — RIVAROXABAN 15 MG: 15 TABLET, FILM COATED ORAL at 09:38

## 2023-08-05 RX ADMIN — DOFETILIDE 250 MCG: 0.25 CAPSULE ORAL at 09:39

## 2023-08-05 RX ADMIN — SODIUM CHLORIDE, PRESERVATIVE FREE 10 ML: 5 INJECTION INTRAVENOUS at 09:41

## 2023-08-05 RX ADMIN — FERROUS SULFATE TAB EC 324 MG (65 MG FE EQUIVALENT) 324 MG: 324 (65 FE) TABLET DELAYED RESPONSE at 09:38

## 2023-08-05 RX ADMIN — BUDESONIDE 500 MCG: 0.5 SUSPENSION RESPIRATORY (INHALATION) at 19:56

## 2023-08-05 RX ADMIN — ARFORMOTEROL TARTRATE 15 MCG: 15 SOLUTION RESPIRATORY (INHALATION) at 08:09

## 2023-08-05 RX ADMIN — RANOLAZINE 500 MG: 500 TABLET, FILM COATED, EXTENDED RELEASE ORAL at 21:17

## 2023-08-05 RX ADMIN — ACETAMINOPHEN 650 MG: 325 TABLET ORAL at 21:17

## 2023-08-05 RX ADMIN — ISOSORBIDE MONONITRATE 120 MG: 60 TABLET, EXTENDED RELEASE ORAL at 09:39

## 2023-08-05 RX ADMIN — BUDESONIDE 500 MCG: 0.5 SUSPENSION RESPIRATORY (INHALATION) at 08:09

## 2023-08-05 RX ADMIN — ISODIUM CHLORIDE 3 ML: 0.03 SOLUTION RESPIRATORY (INHALATION) at 15:53

## 2023-08-05 ASSESSMENT — PAIN SCALES - GENERAL: PAINLEVEL_OUTOF10: 0

## 2023-08-06 LAB
ANION GAP SERPL CALCULATED.3IONS-SCNC: 11 MMOL/L (ref 3–16)
BASOPHILS # BLD: 0 K/UL (ref 0–0.2)
BASOPHILS NFR BLD: 0.2 %
BUN SERPL-MCNC: 51 MG/DL (ref 7–20)
CALCIUM SERPL-MCNC: 9.3 MG/DL (ref 8.3–10.6)
CHLORIDE SERPL-SCNC: 89 MMOL/L (ref 99–110)
CO2 SERPL-SCNC: 38 MMOL/L (ref 21–32)
CREAT SERPL-MCNC: 1.8 MG/DL (ref 0.6–1.2)
DEPRECATED RDW RBC AUTO: 16.5 % (ref 12.4–15.4)
EOSINOPHIL # BLD: 0.1 K/UL (ref 0–0.6)
EOSINOPHIL NFR BLD: 0.6 %
GFR SERPLBLD CREATININE-BSD FMLA CKD-EPI: 29 ML/MIN/{1.73_M2}
GLUCOSE SERPL-MCNC: 140 MG/DL (ref 70–99)
HCT VFR BLD AUTO: 32.8 % (ref 36–48)
HGB BLD-MCNC: 10.5 G/DL (ref 12–16)
LYMPHOCYTES # BLD: 1.1 K/UL (ref 1–5.1)
LYMPHOCYTES NFR BLD: 9 %
MAGNESIUM SERPL-MCNC: 2.4 MG/DL (ref 1.8–2.4)
MCH RBC QN AUTO: 28.9 PG (ref 26–34)
MCHC RBC AUTO-ENTMCNC: 32.1 G/DL (ref 31–36)
MCV RBC AUTO: 90 FL (ref 80–100)
MONOCYTES # BLD: 1.2 K/UL (ref 0–1.3)
MONOCYTES NFR BLD: 9.1 %
NEUTROPHILS # BLD: 10.3 K/UL (ref 1.7–7.7)
NEUTROPHILS NFR BLD: 81.1 %
PLATELET # BLD AUTO: 229 K/UL (ref 135–450)
PMV BLD AUTO: 10 FL (ref 5–10.5)
POTASSIUM SERPL-SCNC: 3.2 MMOL/L (ref 3.5–5.1)
RBC # BLD AUTO: 3.64 M/UL (ref 4–5.2)
SODIUM SERPL-SCNC: 138 MMOL/L (ref 136–145)
WBC # BLD AUTO: 12.7 K/UL (ref 4–11)

## 2023-08-06 PROCEDURE — 2580000003 HC RX 258: Performed by: FAMILY MEDICINE

## 2023-08-06 PROCEDURE — 36415 COLL VENOUS BLD VENIPUNCTURE: CPT

## 2023-08-06 PROCEDURE — 94761 N-INVAS EAR/PLS OXIMETRY MLT: CPT

## 2023-08-06 PROCEDURE — 6370000000 HC RX 637 (ALT 250 FOR IP): Performed by: NURSE PRACTITIONER

## 2023-08-06 PROCEDURE — 6360000002 HC RX W HCPCS: Performed by: INTERNAL MEDICINE

## 2023-08-06 PROCEDURE — 80048 BASIC METABOLIC PNL TOTAL CA: CPT

## 2023-08-06 PROCEDURE — 2580000003 HC RX 258: Performed by: INTERNAL MEDICINE

## 2023-08-06 PROCEDURE — 94660 CPAP INITIATION&MGMT: CPT

## 2023-08-06 PROCEDURE — 85025 COMPLETE CBC W/AUTO DIFF WBC: CPT

## 2023-08-06 PROCEDURE — 99232 SBSQ HOSP IP/OBS MODERATE 35: CPT | Performed by: INTERNAL MEDICINE

## 2023-08-06 PROCEDURE — 83735 ASSAY OF MAGNESIUM: CPT

## 2023-08-06 PROCEDURE — 6370000000 HC RX 637 (ALT 250 FOR IP): Performed by: FAMILY MEDICINE

## 2023-08-06 PROCEDURE — 6370000000 HC RX 637 (ALT 250 FOR IP): Performed by: INTERNAL MEDICINE

## 2023-08-06 PROCEDURE — 2700000000 HC OXYGEN THERAPY PER DAY

## 2023-08-06 PROCEDURE — 1200000000 HC SEMI PRIVATE

## 2023-08-06 PROCEDURE — 94640 AIRWAY INHALATION TREATMENT: CPT

## 2023-08-06 PROCEDURE — 99232 SBSQ HOSP IP/OBS MODERATE 35: CPT | Performed by: NURSE PRACTITIONER

## 2023-08-06 RX ADMIN — LEVALBUTEROL 1.25 MG: 1.25 SOLUTION, CONCENTRATE RESPIRATORY (INHALATION) at 11:42

## 2023-08-06 RX ADMIN — ISODIUM CHLORIDE 3 ML: 0.03 SOLUTION RESPIRATORY (INHALATION) at 11:42

## 2023-08-06 RX ADMIN — AMLODIPINE BESYLATE 10 MG: 10 TABLET ORAL at 08:29

## 2023-08-06 RX ADMIN — FERROUS SULFATE TAB EC 324 MG (65 MG FE EQUIVALENT) 324 MG: 324 (65 FE) TABLET DELAYED RESPONSE at 20:38

## 2023-08-06 RX ADMIN — METHYLPREDNISOLONE SODIUM SUCCINATE 40 MG: 40 INJECTION, POWDER, FOR SOLUTION INTRAMUSCULAR; INTRAVENOUS at 08:29

## 2023-08-06 RX ADMIN — ARFORMOTEROL TARTRATE 15 MCG: 15 SOLUTION RESPIRATORY (INHALATION) at 19:55

## 2023-08-06 RX ADMIN — ROSUVASTATIN CALCIUM 20 MG: 20 TABLET, FILM COATED ORAL at 08:29

## 2023-08-06 RX ADMIN — PANTOPRAZOLE SODIUM 40 MG: 40 TABLET, DELAYED RELEASE ORAL at 06:57

## 2023-08-06 RX ADMIN — RANOLAZINE 500 MG: 500 TABLET, FILM COATED, EXTENDED RELEASE ORAL at 08:29

## 2023-08-06 RX ADMIN — BUDESONIDE 500 MCG: 0.5 SUSPENSION RESPIRATORY (INHALATION) at 07:42

## 2023-08-06 RX ADMIN — HYDRALAZINE HYDROCHLORIDE 50 MG: 50 TABLET, FILM COATED ORAL at 20:38

## 2023-08-06 RX ADMIN — ACETAZOLAMIDE 250 MG: 500 INJECTION, POWDER, LYOPHILIZED, FOR SOLUTION INTRAVENOUS at 04:53

## 2023-08-06 RX ADMIN — SODIUM CHLORIDE, PRESERVATIVE FREE 10 ML: 5 INJECTION INTRAVENOUS at 08:32

## 2023-08-06 RX ADMIN — ISODIUM CHLORIDE 3 ML: 0.03 SOLUTION RESPIRATORY (INHALATION) at 07:42

## 2023-08-06 RX ADMIN — FUROSEMIDE 10 MG/HR: 10 INJECTION, SOLUTION INTRAMUSCULAR; INTRAVENOUS at 04:53

## 2023-08-06 RX ADMIN — BUDESONIDE 500 MCG: 0.5 SUSPENSION RESPIRATORY (INHALATION) at 19:55

## 2023-08-06 RX ADMIN — POTASSIUM CHLORIDE 40 MEQ: 1500 TABLET, EXTENDED RELEASE ORAL at 19:30

## 2023-08-06 RX ADMIN — HYDRALAZINE HYDROCHLORIDE 50 MG: 50 TABLET, FILM COATED ORAL at 06:57

## 2023-08-06 RX ADMIN — FERROUS SULFATE TAB EC 324 MG (65 MG FE EQUIVALENT) 324 MG: 324 (65 FE) TABLET DELAYED RESPONSE at 08:29

## 2023-08-06 RX ADMIN — LEVALBUTEROL 1.25 MG: 1.25 SOLUTION, CONCENTRATE RESPIRATORY (INHALATION) at 19:55

## 2023-08-06 RX ADMIN — CARVEDILOL 6.25 MG: 6.25 TABLET, FILM COATED ORAL at 18:53

## 2023-08-06 RX ADMIN — DOFETILIDE 250 MCG: 0.25 CAPSULE ORAL at 08:29

## 2023-08-06 RX ADMIN — RIVAROXABAN 15 MG: 15 TABLET, FILM COATED ORAL at 08:29

## 2023-08-06 RX ADMIN — ISODIUM CHLORIDE 3 ML: 0.03 SOLUTION RESPIRATORY (INHALATION) at 19:55

## 2023-08-06 RX ADMIN — ISODIUM CHLORIDE 3 ML: 0.03 SOLUTION RESPIRATORY (INHALATION) at 15:29

## 2023-08-06 RX ADMIN — FUROSEMIDE 10 MG/HR: 10 INJECTION, SOLUTION INTRAMUSCULAR; INTRAVENOUS at 17:38

## 2023-08-06 RX ADMIN — DOFETILIDE 250 MCG: 0.25 CAPSULE ORAL at 20:38

## 2023-08-06 RX ADMIN — RANOLAZINE 500 MG: 500 TABLET, FILM COATED, EXTENDED RELEASE ORAL at 20:38

## 2023-08-06 RX ADMIN — LEVALBUTEROL 1.25 MG: 1.25 SOLUTION, CONCENTRATE RESPIRATORY (INHALATION) at 07:42

## 2023-08-06 RX ADMIN — ARFORMOTEROL TARTRATE 15 MCG: 15 SOLUTION RESPIRATORY (INHALATION) at 07:42

## 2023-08-06 RX ADMIN — ISOSORBIDE MONONITRATE 120 MG: 60 TABLET, EXTENDED RELEASE ORAL at 08:29

## 2023-08-06 RX ADMIN — LEVALBUTEROL 1.25 MG: 1.25 SOLUTION, CONCENTRATE RESPIRATORY (INHALATION) at 15:29

## 2023-08-06 RX ADMIN — SODIUM CHLORIDE, PRESERVATIVE FREE 10 ML: 5 INJECTION INTRAVENOUS at 20:42

## 2023-08-06 RX ADMIN — CARVEDILOL 6.25 MG: 6.25 TABLET, FILM COATED ORAL at 08:29

## 2023-08-07 LAB
ANION GAP SERPL CALCULATED.3IONS-SCNC: 12 MMOL/L (ref 3–16)
BASOPHILS # BLD: 0 K/UL (ref 0–0.2)
BASOPHILS NFR BLD: 0.3 %
BUN SERPL-MCNC: 62 MG/DL (ref 7–20)
CALCIUM SERPL-MCNC: 9.5 MG/DL (ref 8.3–10.6)
CHLORIDE SERPL-SCNC: 89 MMOL/L (ref 99–110)
CO2 SERPL-SCNC: 41 MMOL/L (ref 21–32)
CREAT SERPL-MCNC: 2.1 MG/DL (ref 0.6–1.2)
DEPRECATED RDW RBC AUTO: 16.7 % (ref 12.4–15.4)
EOSINOPHIL # BLD: 0.2 K/UL (ref 0–0.6)
EOSINOPHIL NFR BLD: 1.5 %
GFR SERPLBLD CREATININE-BSD FMLA CKD-EPI: 24 ML/MIN/{1.73_M2}
GLUCOSE SERPL-MCNC: 127 MG/DL (ref 70–99)
HCT VFR BLD AUTO: 33.1 % (ref 36–48)
HGB BLD-MCNC: 10.7 G/DL (ref 12–16)
LYMPHOCYTES # BLD: 1.1 K/UL (ref 1–5.1)
LYMPHOCYTES NFR BLD: 8.8 %
MAGNESIUM SERPL-MCNC: 2.4 MG/DL (ref 1.8–2.4)
MCH RBC QN AUTO: 29 PG (ref 26–34)
MCHC RBC AUTO-ENTMCNC: 32.5 G/DL (ref 31–36)
MCV RBC AUTO: 89.3 FL (ref 80–100)
MONOCYTES # BLD: 1.1 K/UL (ref 0–1.3)
MONOCYTES NFR BLD: 8.5 %
NEUTROPHILS # BLD: 10.4 K/UL (ref 1.7–7.7)
NEUTROPHILS NFR BLD: 80.9 %
PLATELET # BLD AUTO: 231 K/UL (ref 135–450)
PMV BLD AUTO: 10.2 FL (ref 5–10.5)
POTASSIUM SERPL-SCNC: 3.1 MMOL/L (ref 3.5–5.1)
RBC # BLD AUTO: 3.7 M/UL (ref 4–5.2)
SODIUM SERPL-SCNC: 142 MMOL/L (ref 136–145)
WBC # BLD AUTO: 12.9 K/UL (ref 4–11)

## 2023-08-07 PROCEDURE — 6370000000 HC RX 637 (ALT 250 FOR IP): Performed by: INTERNAL MEDICINE

## 2023-08-07 PROCEDURE — 97535 SELF CARE MNGMENT TRAINING: CPT

## 2023-08-07 PROCEDURE — 97530 THERAPEUTIC ACTIVITIES: CPT

## 2023-08-07 PROCEDURE — 94660 CPAP INITIATION&MGMT: CPT

## 2023-08-07 PROCEDURE — 1200000000 HC SEMI PRIVATE

## 2023-08-07 PROCEDURE — 2700000000 HC OXYGEN THERAPY PER DAY

## 2023-08-07 PROCEDURE — 6360000002 HC RX W HCPCS: Performed by: INTERNAL MEDICINE

## 2023-08-07 PROCEDURE — 6370000000 HC RX 637 (ALT 250 FOR IP): Performed by: NURSE PRACTITIONER

## 2023-08-07 PROCEDURE — 83735 ASSAY OF MAGNESIUM: CPT

## 2023-08-07 PROCEDURE — 99233 SBSQ HOSP IP/OBS HIGH 50: CPT | Performed by: INTERNAL MEDICINE

## 2023-08-07 PROCEDURE — 92526 ORAL FUNCTION THERAPY: CPT

## 2023-08-07 PROCEDURE — 6370000000 HC RX 637 (ALT 250 FOR IP): Performed by: FAMILY MEDICINE

## 2023-08-07 PROCEDURE — 99232 SBSQ HOSP IP/OBS MODERATE 35: CPT | Performed by: NURSE PRACTITIONER

## 2023-08-07 PROCEDURE — 97110 THERAPEUTIC EXERCISES: CPT

## 2023-08-07 PROCEDURE — 85025 COMPLETE CBC W/AUTO DIFF WBC: CPT

## 2023-08-07 PROCEDURE — 2580000003 HC RX 258: Performed by: FAMILY MEDICINE

## 2023-08-07 PROCEDURE — 94760 N-INVAS EAR/PLS OXIMETRY 1: CPT

## 2023-08-07 PROCEDURE — 94640 AIRWAY INHALATION TREATMENT: CPT

## 2023-08-07 PROCEDURE — 2580000003 HC RX 258: Performed by: INTERNAL MEDICINE

## 2023-08-07 PROCEDURE — 36415 COLL VENOUS BLD VENIPUNCTURE: CPT

## 2023-08-07 PROCEDURE — 80048 BASIC METABOLIC PNL TOTAL CA: CPT

## 2023-08-07 RX ADMIN — ISOSORBIDE MONONITRATE 120 MG: 60 TABLET, EXTENDED RELEASE ORAL at 08:28

## 2023-08-07 RX ADMIN — HYDRALAZINE HYDROCHLORIDE 50 MG: 50 TABLET, FILM COATED ORAL at 07:11

## 2023-08-07 RX ADMIN — BUDESONIDE 500 MCG: 0.5 SUSPENSION RESPIRATORY (INHALATION) at 19:45

## 2023-08-07 RX ADMIN — LEVALBUTEROL 1.25 MG: 1.25 SOLUTION, CONCENTRATE RESPIRATORY (INHALATION) at 11:43

## 2023-08-07 RX ADMIN — METHYLPREDNISOLONE SODIUM SUCCINATE 40 MG: 40 INJECTION, POWDER, FOR SOLUTION INTRAMUSCULAR; INTRAVENOUS at 08:28

## 2023-08-07 RX ADMIN — SODIUM CHLORIDE, PRESERVATIVE FREE 5 ML: 5 INJECTION INTRAVENOUS at 22:09

## 2023-08-07 RX ADMIN — CARVEDILOL 6.25 MG: 6.25 TABLET, FILM COATED ORAL at 17:10

## 2023-08-07 RX ADMIN — HYDRALAZINE HYDROCHLORIDE 50 MG: 50 TABLET, FILM COATED ORAL at 22:07

## 2023-08-07 RX ADMIN — POTASSIUM CHLORIDE 40 MEQ: 1500 TABLET, EXTENDED RELEASE ORAL at 08:28

## 2023-08-07 RX ADMIN — LEVALBUTEROL 1.25 MG: 1.25 SOLUTION, CONCENTRATE RESPIRATORY (INHALATION) at 07:44

## 2023-08-07 RX ADMIN — RANOLAZINE 500 MG: 500 TABLET, FILM COATED, EXTENDED RELEASE ORAL at 08:28

## 2023-08-07 RX ADMIN — ROSUVASTATIN CALCIUM 20 MG: 20 TABLET, FILM COATED ORAL at 08:28

## 2023-08-07 RX ADMIN — FUROSEMIDE 10 MG/HR: 10 INJECTION, SOLUTION INTRAMUSCULAR; INTRAVENOUS at 02:01

## 2023-08-07 RX ADMIN — SODIUM CHLORIDE, PRESERVATIVE FREE 10 ML: 5 INJECTION INTRAVENOUS at 08:29

## 2023-08-07 RX ADMIN — ARFORMOTEROL TARTRATE 15 MCG: 15 SOLUTION RESPIRATORY (INHALATION) at 08:02

## 2023-08-07 RX ADMIN — ISODIUM CHLORIDE 3 ML: 0.03 SOLUTION RESPIRATORY (INHALATION) at 07:44

## 2023-08-07 RX ADMIN — PANTOPRAZOLE SODIUM 40 MG: 40 TABLET, DELAYED RELEASE ORAL at 07:11

## 2023-08-07 RX ADMIN — FERROUS SULFATE TAB EC 324 MG (65 MG FE EQUIVALENT) 324 MG: 324 (65 FE) TABLET DELAYED RESPONSE at 22:07

## 2023-08-07 RX ADMIN — HYDRALAZINE HYDROCHLORIDE 50 MG: 50 TABLET, FILM COATED ORAL at 13:53

## 2023-08-07 RX ADMIN — CARVEDILOL 6.25 MG: 6.25 TABLET, FILM COATED ORAL at 08:28

## 2023-08-07 RX ADMIN — ARFORMOTEROL TARTRATE 15 MCG: 15 SOLUTION RESPIRATORY (INHALATION) at 19:45

## 2023-08-07 RX ADMIN — DOFETILIDE 250 MCG: 0.25 CAPSULE ORAL at 22:08

## 2023-08-07 RX ADMIN — DOFETILIDE 250 MCG: 0.25 CAPSULE ORAL at 08:28

## 2023-08-07 RX ADMIN — AMLODIPINE BESYLATE 10 MG: 10 TABLET ORAL at 08:28

## 2023-08-07 RX ADMIN — LEVALBUTEROL 1.25 MG: 1.25 SOLUTION, CONCENTRATE RESPIRATORY (INHALATION) at 15:58

## 2023-08-07 RX ADMIN — POTASSIUM CHLORIDE 40 MEQ: 1500 TABLET, EXTENDED RELEASE ORAL at 11:21

## 2023-08-07 RX ADMIN — LEVALBUTEROL 1.25 MG: 1.25 SOLUTION, CONCENTRATE RESPIRATORY (INHALATION) at 19:45

## 2023-08-07 RX ADMIN — FERROUS SULFATE TAB EC 324 MG (65 MG FE EQUIVALENT) 324 MG: 324 (65 FE) TABLET DELAYED RESPONSE at 08:28

## 2023-08-07 RX ADMIN — ISODIUM CHLORIDE 3 ML: 0.03 SOLUTION RESPIRATORY (INHALATION) at 15:58

## 2023-08-07 RX ADMIN — RIVAROXABAN 15 MG: 15 TABLET, FILM COATED ORAL at 08:28

## 2023-08-07 RX ADMIN — RANOLAZINE 500 MG: 500 TABLET, FILM COATED, EXTENDED RELEASE ORAL at 22:07

## 2023-08-07 RX ADMIN — ISODIUM CHLORIDE 3 ML: 0.03 SOLUTION RESPIRATORY (INHALATION) at 11:43

## 2023-08-07 RX ADMIN — BUDESONIDE 500 MCG: 0.5 SUSPENSION RESPIRATORY (INHALATION) at 07:54

## 2023-08-07 RX ADMIN — ISODIUM CHLORIDE 3 ML: 0.03 SOLUTION RESPIRATORY (INHALATION) at 19:45

## 2023-08-07 NOTE — NURSE NAVIGATOR
PAD 26 per CardioMEMs reading  Pt's goal PAD is 20. Per Cardiology NP America Funes, it was requested I notify Nephrology to let them know the PAD results. Dr Leslie Ding (spelling?) returned my call. Updates given. No new orders at this time. Lasix gtt remains on hold. Bedside RN, Hira Ya, updated.

## 2023-08-08 LAB
ANION GAP SERPL CALCULATED.3IONS-SCNC: 9 MMOL/L (ref 3–16)
BASE EXCESS BLDA CALC-SCNC: 12.2 MMOL/L (ref -3–3)
BASOPHILS # BLD: 0 K/UL (ref 0–0.2)
BASOPHILS NFR BLD: 0.1 %
BUN SERPL-MCNC: 60 MG/DL (ref 7–20)
CALCIUM SERPL-MCNC: 9.3 MG/DL (ref 8.3–10.6)
CHLORIDE SERPL-SCNC: 92 MMOL/L (ref 99–110)
CO2 BLDA-SCNC: 41.2 MMOL/L
CO2 SERPL-SCNC: 39 MMOL/L (ref 21–32)
COHGB MFR BLDA: 1.6 % (ref 0–1.5)
CREAT SERPL-MCNC: 2.1 MG/DL (ref 0.6–1.2)
DEPRECATED RDW RBC AUTO: 16.7 % (ref 12.4–15.4)
EOSINOPHIL # BLD: 0.1 K/UL (ref 0–0.6)
EOSINOPHIL NFR BLD: 0.9 %
GFR SERPLBLD CREATININE-BSD FMLA CKD-EPI: 24 ML/MIN/{1.73_M2}
GLUCOSE SERPL-MCNC: 106 MG/DL (ref 70–99)
HCO3 BLDA-SCNC: 39.2 MMOL/L (ref 21–29)
HCT VFR BLD AUTO: 32.2 % (ref 36–48)
HGB BLD-MCNC: 10.6 G/DL (ref 12–16)
HGB BLDA-MCNC: 10.3 G/DL (ref 12–16)
LYMPHOCYTES # BLD: 1 K/UL (ref 1–5.1)
LYMPHOCYTES NFR BLD: 7.5 %
MAGNESIUM SERPL-MCNC: 2.7 MG/DL (ref 1.8–2.4)
MCH RBC QN AUTO: 29.2 PG (ref 26–34)
MCHC RBC AUTO-ENTMCNC: 32.8 G/DL (ref 31–36)
MCV RBC AUTO: 89 FL (ref 80–100)
METHGB MFR BLDA: 0.4 %
MONOCYTES # BLD: 1.4 K/UL (ref 0–1.3)
MONOCYTES NFR BLD: 10.5 %
NEUTROPHILS # BLD: 10.5 K/UL (ref 1.7–7.7)
NEUTROPHILS NFR BLD: 81 %
O2 THERAPY: ABNORMAL
PCO2 BLDA: 63.9 MMHG (ref 35–45)
PH BLDA: 7.4 [PH] (ref 7.35–7.45)
PLATELET # BLD AUTO: 227 K/UL (ref 135–450)
PMV BLD AUTO: 10.2 FL (ref 5–10.5)
PO2 BLDA: 109 MMHG (ref 75–108)
POTASSIUM SERPL-SCNC: 3.4 MMOL/L (ref 3.5–5.1)
RBC # BLD AUTO: 3.62 M/UL (ref 4–5.2)
SAO2 % BLDA: 99.1 %
SODIUM SERPL-SCNC: 140 MMOL/L (ref 136–145)
WBC # BLD AUTO: 13 K/UL (ref 4–11)

## 2023-08-08 PROCEDURE — 6370000000 HC RX 637 (ALT 250 FOR IP): Performed by: FAMILY MEDICINE

## 2023-08-08 PROCEDURE — 6360000002 HC RX W HCPCS: Performed by: INTERNAL MEDICINE

## 2023-08-08 PROCEDURE — 6370000000 HC RX 637 (ALT 250 FOR IP): Performed by: NURSE PRACTITIONER

## 2023-08-08 PROCEDURE — 94640 AIRWAY INHALATION TREATMENT: CPT

## 2023-08-08 PROCEDURE — 6370000000 HC RX 637 (ALT 250 FOR IP): Performed by: INTERNAL MEDICINE

## 2023-08-08 PROCEDURE — 97530 THERAPEUTIC ACTIVITIES: CPT

## 2023-08-08 PROCEDURE — 85025 COMPLETE CBC W/AUTO DIFF WBC: CPT

## 2023-08-08 PROCEDURE — 94760 N-INVAS EAR/PLS OXIMETRY 1: CPT

## 2023-08-08 PROCEDURE — 2700000000 HC OXYGEN THERAPY PER DAY

## 2023-08-08 PROCEDURE — 36415 COLL VENOUS BLD VENIPUNCTURE: CPT

## 2023-08-08 PROCEDURE — 99233 SBSQ HOSP IP/OBS HIGH 50: CPT | Performed by: INTERNAL MEDICINE

## 2023-08-08 PROCEDURE — 36600 WITHDRAWAL OF ARTERIAL BLOOD: CPT

## 2023-08-08 PROCEDURE — 82803 BLOOD GASES ANY COMBINATION: CPT

## 2023-08-08 PROCEDURE — 2580000003 HC RX 258: Performed by: FAMILY MEDICINE

## 2023-08-08 PROCEDURE — 94660 CPAP INITIATION&MGMT: CPT

## 2023-08-08 PROCEDURE — 99232 SBSQ HOSP IP/OBS MODERATE 35: CPT | Performed by: INTERNAL MEDICINE

## 2023-08-08 PROCEDURE — 97110 THERAPEUTIC EXERCISES: CPT

## 2023-08-08 PROCEDURE — 1200000000 HC SEMI PRIVATE

## 2023-08-08 PROCEDURE — 80048 BASIC METABOLIC PNL TOTAL CA: CPT

## 2023-08-08 PROCEDURE — 83735 ASSAY OF MAGNESIUM: CPT

## 2023-08-08 RX ORDER — PREDNISONE 20 MG/1
40 TABLET ORAL DAILY
Status: DISCONTINUED | OUTPATIENT
Start: 2023-08-09 | End: 2023-08-09 | Stop reason: HOSPADM

## 2023-08-08 RX ORDER — TORSEMIDE 20 MG/1
40 TABLET ORAL DAILY
Status: DISCONTINUED | OUTPATIENT
Start: 2023-08-08 | End: 2023-08-09 | Stop reason: HOSPADM

## 2023-08-08 RX ADMIN — ARFORMOTEROL TARTRATE 15 MCG: 15 SOLUTION RESPIRATORY (INHALATION) at 20:29

## 2023-08-08 RX ADMIN — SODIUM CHLORIDE, PRESERVATIVE FREE 10 ML: 5 INJECTION INTRAVENOUS at 21:20

## 2023-08-08 RX ADMIN — LEVALBUTEROL 1.25 MG: 1.25 SOLUTION, CONCENTRATE RESPIRATORY (INHALATION) at 11:49

## 2023-08-08 RX ADMIN — METHYLPREDNISOLONE SODIUM SUCCINATE 40 MG: 40 INJECTION, POWDER, FOR SOLUTION INTRAMUSCULAR; INTRAVENOUS at 09:26

## 2023-08-08 RX ADMIN — AMLODIPINE BESYLATE 10 MG: 10 TABLET ORAL at 09:26

## 2023-08-08 RX ADMIN — FERROUS SULFATE TAB EC 324 MG (65 MG FE EQUIVALENT) 324 MG: 324 (65 FE) TABLET DELAYED RESPONSE at 21:19

## 2023-08-08 RX ADMIN — DOFETILIDE 250 MCG: 0.25 CAPSULE ORAL at 21:19

## 2023-08-08 RX ADMIN — TORSEMIDE 40 MG: 20 TABLET ORAL at 12:25

## 2023-08-08 RX ADMIN — HYDRALAZINE HYDROCHLORIDE 50 MG: 50 TABLET, FILM COATED ORAL at 15:59

## 2023-08-08 RX ADMIN — LEVALBUTEROL 1.25 MG: 1.25 SOLUTION, CONCENTRATE RESPIRATORY (INHALATION) at 20:29

## 2023-08-08 RX ADMIN — ROSUVASTATIN CALCIUM 20 MG: 20 TABLET, FILM COATED ORAL at 09:27

## 2023-08-08 RX ADMIN — ISOSORBIDE MONONITRATE 120 MG: 60 TABLET, EXTENDED RELEASE ORAL at 09:26

## 2023-08-08 RX ADMIN — ISODIUM CHLORIDE 3 ML: 0.03 SOLUTION RESPIRATORY (INHALATION) at 11:49

## 2023-08-08 RX ADMIN — LEVALBUTEROL 1.25 MG: 1.25 SOLUTION, CONCENTRATE RESPIRATORY (INHALATION) at 07:55

## 2023-08-08 RX ADMIN — CARVEDILOL 6.25 MG: 6.25 TABLET, FILM COATED ORAL at 18:48

## 2023-08-08 RX ADMIN — POTASSIUM CHLORIDE 40 MEQ: 1500 TABLET, EXTENDED RELEASE ORAL at 12:25

## 2023-08-08 RX ADMIN — SODIUM CHLORIDE, PRESERVATIVE FREE 10 ML: 5 INJECTION INTRAVENOUS at 09:29

## 2023-08-08 RX ADMIN — CARVEDILOL 6.25 MG: 6.25 TABLET, FILM COATED ORAL at 09:26

## 2023-08-08 RX ADMIN — RIVAROXABAN 15 MG: 15 TABLET, FILM COATED ORAL at 09:26

## 2023-08-08 RX ADMIN — RANOLAZINE 500 MG: 500 TABLET, FILM COATED, EXTENDED RELEASE ORAL at 21:19

## 2023-08-08 RX ADMIN — FERROUS SULFATE TAB EC 324 MG (65 MG FE EQUIVALENT) 324 MG: 324 (65 FE) TABLET DELAYED RESPONSE at 09:26

## 2023-08-08 RX ADMIN — RANOLAZINE 500 MG: 500 TABLET, FILM COATED, EXTENDED RELEASE ORAL at 09:26

## 2023-08-08 RX ADMIN — ARFORMOTEROL TARTRATE 15 MCG: 15 SOLUTION RESPIRATORY (INHALATION) at 08:05

## 2023-08-08 RX ADMIN — ISODIUM CHLORIDE 3 ML: 0.03 SOLUTION RESPIRATORY (INHALATION) at 07:55

## 2023-08-08 RX ADMIN — PANTOPRAZOLE SODIUM 40 MG: 40 TABLET, DELAYED RELEASE ORAL at 06:40

## 2023-08-08 RX ADMIN — DOFETILIDE 250 MCG: 0.25 CAPSULE ORAL at 09:28

## 2023-08-08 RX ADMIN — HYDRALAZINE HYDROCHLORIDE 50 MG: 50 TABLET, FILM COATED ORAL at 21:19

## 2023-08-08 RX ADMIN — HYDRALAZINE HYDROCHLORIDE 50 MG: 50 TABLET, FILM COATED ORAL at 06:40

## 2023-08-08 RX ADMIN — ISODIUM CHLORIDE 3 ML: 0.03 SOLUTION RESPIRATORY (INHALATION) at 20:29

## 2023-08-08 ASSESSMENT — PAIN SCALES - GENERAL
PAINLEVEL_OUTOF10: 0
PAINLEVEL_OUTOF10: 0

## 2023-08-08 NOTE — DISCHARGE INSTR - COC
MD at [address] [place] [address] N/A 5/24/2023    ENTEROSCOPY PUSH DIAGNOSTIC performed by Eron Ernst MD at [facility] ENDOSCOPY       Immunization History:   Immunization History   Administered Date(s) Administered    COVID-19, PFIZER Bivalent, DO NOT Dilute, (age 12y+), IM, 30 mcg/0.3 mL 03/03/2023    COVID-19, PFIZER PURPLE top, DILUTE for use, (age 15 y+), 30mcg/0.3mL 02/25/2021, 03/18/2021    Influenza Vaccine, unspecified formulation 01/31/2018    Influenza, FLUAD, (age 72 y+), Adjuvanted, 0.5mL 11/22/2022    Influenza, FLUARIX, FLULAVAL, FLUZONE (age 10 mo+) AND AFLURIA, (age 1 y+), PF, 0.5mL 10/08/2020    Pneumococcal, PCV-13, PREVNAR 15, (age 6w+), IM, 0.5mL 02/16/2017    Pneumococcal, PPSV23, PNEUMOVAX 21, (age 2y+), SC/IM, 0.5mL 06/30/2015       Active Problems:  Patient Active Problem List   Diagnosis Code    Essential hypertension I10    Hyperlipidemia E78.5    Coronary atherosclerosis due to calcified coronary lesion of native artery I25.10, I25.84    DENISE (obstructive sleep apnea) G47.33    History of DVT (deep vein thrombosis) Z86.718    Family history of DVT Z82.49    AAA (abdominal aortic aneurysm) without rupture ([facility]) I71.40    Pleural effusion, left J90    Pleural effusion J90    COPD exacerbation ([address]) J44.1    Pelvic pain in female R10.2    Vitamin D deficiency E55.9    Other emphysema ([address]) J43.8    Acute bronchitis J20.9    Acute respiratory failure with hypoxia ([facility]) J96.01    Abnormal CXR R93.89    Atypical pneumonia J18.9    Atrial fibrillation with RVR ([facility]) I48.91    Chronic diastolic (congestive) heart failure ([facility]) I50.32    PVD (peripheral vascular disease) ([facility]) I73.9    Abnormal nuclear stress test R94.39    AAA (abdominal aortic aneurysm) ([facility]) I71.40    PAF (paroxysmal atrial fibrillation) ([facility]) I48.0    Endoleak post (EVAR) endovascular aneurysm repair, 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

## 2023-08-08 NOTE — CARE COORDINATION
08/08/23 1337   IMM Letter   IMM Letter given to Patient/Family/Significant other/Guardian/POA/by: Education provided to patient, patient reported no questions and verbalized understanding. Patient aware of 4 hours allotted time to determine if they choose to pursue Medicare appeal process.    IMM Letter date given: 08/08/23   IMM Letter time given: 1250

## 2023-08-08 NOTE — CARE COORDINATION
Discharge Planning:  Current discharge plan is home with [facility] and [address] [place] [address]. SW following.

## 2023-08-08 NOTE — CARE COORDINATION
the appropriate lines. Copy of letter offered and they are aware that the original copy of IMM letter #2 is available prior to discharge from the paper chart on the unit. Electronic documentation has been entered into epic for IMM letter #2 and original paper copy has been added to the paper chart at the nurses station. Additional CM Notes: Patient discharge home with  and [facility]. The Plan for Transition of Care is related to the following treatment goals of Shortness of breath [R06.02]  Hypoxia [R09.02]  COPD exacerbation (HCC) [J44.1]  Hypertensive urgency [I16.0]  Elevated brain natriuretic peptide (BNP) level [R79.89]  Bilateral lower extremity edema [R60.0]  Acute on chronic respiratory failure ([address]) [J96.20]    The Patient and/or patient representative Luis Bae and her family were provided with a choice of provider and agrees with the discharge plan Yes    Freedom of choice list was provided with basic dialogue that supports the patient's individualized plan of care/goals and shares the quality data associated with the providers.  Yes    [address]   Case Management Department  Ph: [phone]  Fax: [phone]

## 2023-08-09 VITALS
SYSTOLIC BLOOD PRESSURE: 158 MMHG | WEIGHT: 230.6 LBS | RESPIRATION RATE: 16 BRPM | OXYGEN SATURATION: 99 % | TEMPERATURE: 97.6 F | BODY MASS INDEX: 40.86 KG/M2 | DIASTOLIC BLOOD PRESSURE: 67 MMHG | HEIGHT: 63 IN | HEART RATE: 52 BPM

## 2023-08-09 LAB
ANION GAP SERPL CALCULATED.3IONS-SCNC: 6 MMOL/L (ref 3–16)
BUN SERPL-MCNC: 56 MG/DL (ref 7–20)
CALCIUM SERPL-MCNC: 9 MG/DL (ref 8.3–10.6)
CHLORIDE SERPL-SCNC: 95 MMOL/L (ref 99–110)
CO2 SERPL-SCNC: 39 MMOL/L (ref 21–32)
CREAT SERPL-MCNC: 1.7 MG/DL (ref 0.6–1.2)
GFR SERPLBLD CREATININE-BSD FMLA CKD-EPI: 31 ML/MIN/{1.73_M2}
GLUCOSE SERPL-MCNC: 151 MG/DL (ref 70–99)
POTASSIUM SERPL-SCNC: 4 MMOL/L (ref 3.5–5.1)
SODIUM SERPL-SCNC: 140 MMOL/L (ref 136–145)

## 2023-08-09 PROCEDURE — 80048 BASIC METABOLIC PNL TOTAL CA: CPT

## 2023-08-09 PROCEDURE — 2580000003 HC RX 258: Performed by: FAMILY MEDICINE

## 2023-08-09 PROCEDURE — 94760 N-INVAS EAR/PLS OXIMETRY 1: CPT

## 2023-08-09 PROCEDURE — 6370000000 HC RX 637 (ALT 250 FOR IP): Performed by: NURSE PRACTITIONER

## 2023-08-09 PROCEDURE — 94660 CPAP INITIATION&MGMT: CPT

## 2023-08-09 PROCEDURE — 99232 SBSQ HOSP IP/OBS MODERATE 35: CPT | Performed by: INTERNAL MEDICINE

## 2023-08-09 PROCEDURE — 6370000000 HC RX 637 (ALT 250 FOR IP): Performed by: INTERNAL MEDICINE

## 2023-08-09 PROCEDURE — 6360000002 HC RX W HCPCS: Performed by: INTERNAL MEDICINE

## 2023-08-09 PROCEDURE — 36415 COLL VENOUS BLD VENIPUNCTURE: CPT

## 2023-08-09 PROCEDURE — 94640 AIRWAY INHALATION TREATMENT: CPT

## 2023-08-09 PROCEDURE — 2700000000 HC OXYGEN THERAPY PER DAY

## 2023-08-09 PROCEDURE — 6370000000 HC RX 637 (ALT 250 FOR IP): Performed by: FAMILY MEDICINE

## 2023-08-09 RX ORDER — PREDNISONE 20 MG/1
40 TABLET ORAL DAILY
Qty: 6 TABLET | Refills: 0 | Status: SHIPPED | OUTPATIENT
Start: 2023-08-10 | End: 2023-08-13

## 2023-08-09 RX ORDER — CARVEDILOL 6.25 MG/1
6.25 TABLET ORAL 2 TIMES DAILY WITH MEALS
Qty: 60 TABLET | Refills: 3 | Status: SHIPPED | OUTPATIENT
Start: 2023-08-09

## 2023-08-09 RX ADMIN — LEVALBUTEROL 1.25 MG: 1.25 SOLUTION, CONCENTRATE RESPIRATORY (INHALATION) at 08:31

## 2023-08-09 RX ADMIN — HYDRALAZINE HYDROCHLORIDE 50 MG: 50 TABLET, FILM COATED ORAL at 14:23

## 2023-08-09 RX ADMIN — ROSUVASTATIN CALCIUM 20 MG: 20 TABLET, FILM COATED ORAL at 08:05

## 2023-08-09 RX ADMIN — DOFETILIDE 250 MCG: 0.25 CAPSULE ORAL at 08:08

## 2023-08-09 RX ADMIN — ARFORMOTEROL TARTRATE 15 MCG: 15 SOLUTION RESPIRATORY (INHALATION) at 08:30

## 2023-08-09 RX ADMIN — ISODIUM CHLORIDE 3 ML: 0.03 SOLUTION RESPIRATORY (INHALATION) at 12:01

## 2023-08-09 RX ADMIN — PANTOPRAZOLE SODIUM 40 MG: 40 TABLET, DELAYED RELEASE ORAL at 05:00

## 2023-08-09 RX ADMIN — ISOSORBIDE MONONITRATE 120 MG: 60 TABLET, EXTENDED RELEASE ORAL at 08:05

## 2023-08-09 RX ADMIN — TORSEMIDE 40 MG: 20 TABLET ORAL at 08:05

## 2023-08-09 RX ADMIN — RIVAROXABAN 15 MG: 15 TABLET, FILM COATED ORAL at 08:06

## 2023-08-09 RX ADMIN — HYDRALAZINE HYDROCHLORIDE 50 MG: 50 TABLET, FILM COATED ORAL at 04:53

## 2023-08-09 RX ADMIN — PREDNISONE 40 MG: 20 TABLET ORAL at 08:05

## 2023-08-09 RX ADMIN — SODIUM CHLORIDE, PRESERVATIVE FREE 10 ML: 5 INJECTION INTRAVENOUS at 08:06

## 2023-08-09 RX ADMIN — FERROUS SULFATE TAB EC 324 MG (65 MG FE EQUIVALENT) 324 MG: 324 (65 FE) TABLET DELAYED RESPONSE at 08:06

## 2023-08-09 RX ADMIN — ISODIUM CHLORIDE 3 ML: 0.03 SOLUTION RESPIRATORY (INHALATION) at 08:31

## 2023-08-09 RX ADMIN — RANOLAZINE 500 MG: 500 TABLET, FILM COATED, EXTENDED RELEASE ORAL at 08:04

## 2023-08-09 RX ADMIN — CARVEDILOL 6.25 MG: 6.25 TABLET, FILM COATED ORAL at 08:06

## 2023-08-09 RX ADMIN — AMLODIPINE BESYLATE 10 MG: 10 TABLET ORAL at 08:05

## 2023-08-09 RX ADMIN — LEVALBUTEROL 1.25 MG: 1.25 SOLUTION, CONCENTRATE RESPIRATORY (INHALATION) at 12:01

## 2023-08-09 NOTE — DISCHARGE INSTR - DIET

## 2023-08-09 NOTE — CARE COORDINATION
[facility]    DC order noted, all docs needed have been faxed to [facility] for home care services.     Home care to see patient by 8/11/23    Kristy Hoskins RN, BSN CTN  [facility] [phone]

## 2023-08-09 NOTE — DISCHARGE SUMMARY
Diagnoses: COPD, severe ([address])      hydrALAZINE (APRESOLINE) 50 MG tablet TAKE ONE TABLET BY MOUTH EVERY 8 HOURS  Qty: 270 tablet, Refills: 1      dofetilide (TIKOSYN) 250 MCG capsule TAKE ONE CAPSULE BY MOUTH EVERY 12 HOURS  Qty: 180 capsule, Refills: 3      albuterol (ACCUNEB) 0.63 MG/3ML nebulizer solution Take 3 mLs by nebulization every 4 hours as needed for Wheezing      vitamin C (ASCORBIC ACID) 500 MG tablet Take 1 tablet by mouth daily      Multiple Vitamins-Minerals (MULTI FOR HER 50+ PO) Take 1 tablet by mouth daily             Time Spent on discharge is more than 45 minutes in the examination, evaluation, counseling and review of medications and discharge plan. Signed:    Alyssa Sauer MD   8/9/2023      Thank you Dennis James MD for the opportunity to be involved in this patient's care. If you have any questions or concerns please feel free to contact me at [phone].

## 2023-08-09 NOTE — PLAN OF CARE
Problem: Discharge Planning  Goal: Discharge to home or other facility with appropriate resources  8/1/2023 1200 by Yanet Guevara RN  Outcome: Progressing  8/1/2023 0654 by Nedra Sher RN  Outcome: Progressing     Problem: Respiratory - Adult  Goal: Achieves optimal ventilation and oxygenation  8/1/2023 1200 by Yanet Guevara RN  Outcome: Progressing  8/1/2023 0654 by Nedra Sher RN  Outcome: Progressing     Problem: Cardiovascular - Adult  Goal: Maintains optimal cardiac output and hemodynamic stability  8/1/2023 1200 by Yanet Guevara RN  Outcome: Progressing  8/1/2023 0654 by Nedra Sher RN  Outcome: Progressing  Goal: Absence of cardiac dysrhythmias or at baseline  8/1/2023 1200 by Yanet Guevara RN  Outcome: Progressing  8/1/2023 0654 by Nedra Sher RN  Outcome: Progressing     Problem: Musculoskeletal - Adult  Goal: Return mobility to safest level of function  8/1/2023 1200 by Yanet Guevara RN  Outcome: Progressing  8/1/2023 0654 by Nedra Sher RN  Outcome: Progressing  Goal: Maintain proper alignment of affected body part  8/1/2023 1200 by Yanet Guevara RN  Outcome: Progressing  8/1/2023 0654 by Nedra Sher RN  Outcome: Progressing  Goal: Return ADL status to a safe level of function  8/1/2023 1200 by Yanet Guevara RN  Outcome: Progressing  8/1/2023 0654 by Nedra Sher RN  Outcome: Progressing     Problem: Metabolic/Fluid and Electrolytes - Adult  Goal: Electrolytes maintained within normal limits  8/1/2023 1200 by Yanet Guevara RN  Outcome: Progressing  8/1/2023 0654 by Nedra Sher RN  Outcome: Progressing  Goal: Hemodynamic stability and optimal renal function maintained  8/1/2023 1200 by Yanet Guevara RN  Outcome: Progressing  8/1/2023 0654 by Nedra Sher RN  Outcome: Progressing  Goal: Glucose maintained within prescribed range  8/1/2023 1200 by Yanet Guevara RN  Outcome: Progressing  8/1/2023
Problem: Discharge Planning  Goal: Discharge to home or other facility with appropriate resources  Outcome: Progressing     Problem: Respiratory - Adult  Goal: Achieves optimal ventilation and oxygenation  Outcome: Progressing     Problem: Cardiovascular - Adult  Goal: Maintains optimal cardiac output and hemodynamic stability  Outcome: Progressing     Problem: Cardiovascular - Adult  Goal: Absence of cardiac dysrhythmias or at baseline  Outcome: Progressing     Problem: Musculoskeletal - Adult  Goal: Return mobility to safest level of function  Outcome: Progressing     Problem: Musculoskeletal - Adult  Goal: Maintain proper alignment of affected body part  Outcome: Progressing     Problem: Musculoskeletal - Adult  Goal: Return ADL status to a safe level of function  Outcome: Progressing     Problem: Metabolic/Fluid and Electrolytes - Adult  Goal: Electrolytes maintained within normal limits  Outcome: Progressing     Problem: Metabolic/Fluid and Electrolytes - Adult  Goal: Hemodynamic stability and optimal renal function maintained  Outcome: Progressing     Problem: Metabolic/Fluid and Electrolytes - Adult  Goal: Glucose maintained within prescribed range  Outcome: Progressing     Problem: Hematologic - Adult  Goal: Maintains hematologic stability  Outcome: Progressing     Problem: Safety - Adult  Goal: Free from fall injury  8/3/2023 2201 by Maria Del Carmen Sultana RN  Outcome: Progressing     Problem: ABCDS Injury Assessment  Goal: Absence of physical injury  Outcome: Progressing     Problem: Skin/Tissue Integrity  Goal: Absence of new skin breakdown  Description: 1. Monitor for areas of redness and/or skin breakdown  2. Assess vascular access sites hourly  3. Every 4-6 hours minimum:  Change oxygen saturation probe site  4. Every 4-6 hours:  If on nasal continuous positive airway pressure, respiratory therapy assess nares and determine need for appliance change or resting period.   Outcome: Progressing
Problem: Discharge Planning  Goal: Discharge to home or other facility with appropriate resources  Outcome: Progressing     Problem: Respiratory - Adult  Goal: Achieves optimal ventilation and oxygenation  Outcome: Progressing     Problem: Cardiovascular - Adult  Goal: Maintains optimal cardiac output and hemodynamic stability  Outcome: Progressing     Problem: Cardiovascular - Adult  Goal: Absence of cardiac dysrhythmias or at baseline  Outcome: Progressing     Problem: Musculoskeletal - Adult  Goal: Return mobility to safest level of function  Outcome: Progressing     Problem: Musculoskeletal - Adult  Goal: Maintain proper alignment of affected body part  Outcome: Progressing     Problem: Musculoskeletal - Adult  Goal: Return ADL status to a safe level of function  Outcome: Progressing     Problem: Metabolic/Fluid and Electrolytes - Adult  Goal: Electrolytes maintained within normal limits  Outcome: Progressing     Problem: Metabolic/Fluid and Electrolytes - Adult  Goal: Hemodynamic stability and optimal renal function maintained  Outcome: Progressing     Problem: Metabolic/Fluid and Electrolytes - Adult  Goal: Glucose maintained within prescribed range  Outcome: Progressing     Problem: Hematologic - Adult  Goal: Maintains hematologic stability  Outcome: Progressing     Problem: Safety - Adult  Goal: Free from fall injury  Outcome: Progressing     Problem: ABCDS Injury Assessment  Goal: Absence of physical injury  Outcome: Progressing     Problem: Skin/Tissue Integrity  Goal: Absence of new skin breakdown  Description: 1. Monitor for areas of redness and/or skin breakdown  2. Assess vascular access sites hourly  3. Every 4-6 hours minimum:  Change oxygen saturation probe site  4. Every 4-6 hours:  If on nasal continuous positive airway pressure, respiratory therapy assess nares and determine need for appliance change or resting period.   Outcome: Progressing
Problem: Discharge Planning  Goal: Discharge to home or other facility with appropriate resources  Outcome: Progressing     Problem: Respiratory - Adult  Goal: Achieves optimal ventilation and oxygenation  Outcome: Progressing     Problem: Cardiovascular - Adult  Goal: Maintains optimal cardiac output and hemodynamic stability  Outcome: Progressing     Problem: Cardiovascular - Adult  Goal: Absence of cardiac dysrhythmias or at baseline  Outcome: Progressing     Problem: Musculoskeletal - Adult  Goal: Return mobility to safest level of function  Outcome: Progressing     Problem: Musculoskeletal - Adult  Goal: Maintain proper alignment of affected body part  Outcome: Progressing     Problem: Musculoskeletal - Adult  Goal: Return ADL status to a safe level of function  Outcome: Progressing     Problem: Metabolic/Fluid and Electrolytes - Adult  Goal: Electrolytes maintained within normal limits  Outcome: Progressing     Problem: Metabolic/Fluid and Electrolytes - Adult  Goal: Hemodynamic stability and optimal renal function maintained  Outcome: Progressing     Problem: Metabolic/Fluid and Electrolytes - Adult  Goal: Glucose maintained within prescribed range  Outcome: Progressing     Problem: Hematologic - Adult  Goal: Maintains hematologic stability  Outcome: Progressing     Problem: Safety - Adult  Goal: Free from fall injury  Outcome: Progressing     Problem: ABCDS Injury Assessment  Goal: Absence of physical injury  Outcome: Progressing     Problem: Skin/Tissue Integrity  Goal: Absence of new skin breakdown  Description: 1. Monitor for areas of redness and/or skin breakdown  2. Assess vascular access sites hourly  3. Every 4-6 hours minimum:  Change oxygen saturation probe site  4. Every 4-6 hours:  If on nasal continuous positive airway pressure, respiratory therapy assess nares and determine need for appliance change or resting period.   Outcome: Progressing     Problem: Pain  Goal: Verbalizes/displays
Problem: Discharge Planning  Goal: Discharge to home or other facility with appropriate resources  Outcome: Progressing     Problem: Respiratory - Adult  Goal: Achieves optimal ventilation and oxygenation  Outcome: Progressing     Problem: Cardiovascular - Adult  Goal: Maintains optimal cardiac output and hemodynamic stability  Outcome: Progressing  Goal: Absence of cardiac dysrhythmias or at baseline  Outcome: Progressing     Problem: Musculoskeletal - Adult  Goal: Return mobility to safest level of function  Outcome: Progressing  Goal: Maintain proper alignment of affected body part  Outcome: Progressing  Goal: Return ADL status to a safe level of function  Outcome: Progressing     Problem: Metabolic/Fluid and Electrolytes - Adult  Goal: Electrolytes maintained within normal limits  Outcome: Progressing  Goal: Hemodynamic stability and optimal renal function maintained  Outcome: Progressing  Goal: Glucose maintained within prescribed range  Outcome: Progressing     Problem: Hematologic - Adult  Goal: Maintains hematologic stability  Outcome: Progressing     Problem: Safety - Adult  Goal: Free from fall injury  Outcome: Progressing     Problem: ABCDS Injury Assessment  Goal: Absence of physical injury  Outcome: Progressing     Problem: Skin/Tissue Integrity  Goal: Absence of new skin breakdown  Description: 1. Monitor for areas of redness and/or skin breakdown  2. Assess vascular access sites hourly  3. Every 4-6 hours minimum:  Change oxygen saturation probe site  4. Every 4-6 hours:  If on nasal continuous positive airway pressure, respiratory therapy assess nares and determine need for appliance change or resting period.   Outcome: Progressing
Problem: Discharge Planning  Goal: Discharge to home or other facility with appropriate resources  Outcome: Progressing     Problem: Respiratory - Adult  Goal: Achieves optimal ventilation and oxygenation  Outcome: Progressing     Problem: Cardiovascular - Adult  Goal: Maintains optimal cardiac output and hemodynamic stability  Outcome: Progressing  Goal: Absence of cardiac dysrhythmias or at baseline  Outcome: Progressing     Problem: Musculoskeletal - Adult  Goal: Return mobility to safest level of function  Outcome: Progressing  Goal: Maintain proper alignment of affected body part  Outcome: Progressing  Goal: Return ADL status to a safe level of function  Outcome: Progressing     Problem: Metabolic/Fluid and Electrolytes - Adult  Goal: Electrolytes maintained within normal limits  Outcome: Progressing  Goal: Hemodynamic stability and optimal renal function maintained  Outcome: Progressing  Goal: Glucose maintained within prescribed range  Outcome: Progressing     Problem: Hematologic - Adult  Goal: Maintains hematologic stability  Outcome: Progressing     Problem: Safety - Adult  Goal: Free from fall injury  Outcome: Progressing     Problem: ABCDS Injury Assessment  Goal: Absence of physical injury  Outcome: Progressing     Problem: Skin/Tissue Integrity  Goal: Absence of new skin breakdown  Description: 1. Monitor for areas of redness and/or skin breakdown  2. Assess vascular access sites hourly  3. Every 4-6 hours minimum:  Change oxygen saturation probe site  4. Every 4-6 hours:  If on nasal continuous positive airway pressure, respiratory therapy assess nares and determine need for appliance change or resting period.   Outcome: Progressing     Problem: Pain  Goal: Verbalizes/displays adequate comfort level or baseline comfort level  Outcome: Progressing
Problem: Discharge Planning  Goal: Discharge to home or other facility with appropriate resources  Outcome: Progressing  Flowsheets (Taken 8/5/2023 2100)  Discharge to home or other facility with appropriate resources:   Identify barriers to discharge with patient and caregiver   Arrange for needed discharge resources and transportation as appropriate   Identify discharge learning needs (meds, wound care, etc)   Refer to discharge planning if patient needs post-hospital services based on physician order or complex needs related to functional status, cognitive ability or social support system     Problem: Respiratory - Adult  Goal: Achieves optimal ventilation and oxygenation  Outcome: Progressing  Flowsheets (Taken 8/5/2023 2100)  Achieves optimal ventilation and oxygenation:   Assess for changes in respiratory status   Assess for changes in mentation and behavior   Position to facilitate oxygenation and minimize respiratory effort   Oxygen supplementation based on oxygen saturation or arterial blood gases   Encourage broncho-pulmonary hygiene including cough, deep breathe, incentive spirometry   Assess and instruct to report shortness of breath or any respiratory difficulty   Respiratory therapy support as indicated     Problem: Cardiovascular - Adult  Goal: Maintains optimal cardiac output and hemodynamic stability  Outcome: Progressing  Goal: Absence of cardiac dysrhythmias or at baseline  Outcome: Progressing  Flowsheets (Taken 8/5/2023 2100)  Absence of cardiac dysrhythmias or at baseline: Monitor cardiac rate and rhythm     Problem: Musculoskeletal - Adult  Goal: Return mobility to safest level of function  Outcome: Progressing  Flowsheets (Taken 8/5/2023 2100)  Return Mobility to Safest Level of Function:   Assess patient stability and activity tolerance for standing, transferring and ambulating with or without assistive devices   Assist with transfers and ambulation using safe patient handling equipment as
Problem: Pain  Goal: Verbalizes/displays adequate comfort level or baseline comfort level  8/8/2023 1210 by Cata Smith RN  Outcome: Progressing     Problem: Skin/Tissue Integrity  Goal: Absence of new skin breakdown  Description: 1. Monitor for areas of redness and/or skin breakdown  2. Assess vascular access sites hourly  3. Every 4-6 hours minimum:  Change oxygen saturation probe site  4. Every 4-6 hours:  If on nasal continuous positive airway pressure, respiratory therapy assess nares and determine need for appliance change or resting period.   8/8/2023 1210 by Cata Smith RN  Outcome: Progressing     Problem: ABCDS Injury Assessment  Goal: Absence of physical injury  8/8/2023 1210 by Cata Smith RN  Outcome: Progressing     Problem: Safety - Adult  Goal: Free from fall injury  8/8/2023 1210 by Cata Smith RN  Outcome: Progressing     Problem: Hematologic - Adult  Goal: Maintains hematologic stability  8/8/2023 1210 by Cata Smith RN  Outcome: Progressing     Problem: Metabolic/Fluid and Electrolytes - Adult  Goal: Electrolytes maintained within normal limits  8/8/2023 1210 by Cata Smith RN  Outcome: Progressing     Problem: Metabolic/Fluid and Electrolytes - Adult  Goal: Hemodynamic stability and optimal renal function maintained  8/8/2023 1210 by Cata Smith RN  Outcome: Progressing     Problem: Metabolic/Fluid and Electrolytes - Adult  Goal: Glucose maintained within prescribed range  8/8/2023 1210 by Cata Smith RN  Outcome: Progressing     Problem: Musculoskeletal - Adult  Goal: Return mobility to safest level of function  8/8/2023 1210 by Cata Smith RN  Outcome: Progressing     Problem: Musculoskeletal - Adult  Goal: Maintain proper alignment of affected body part  8/8/2023 1210 by Cata Smith RN  Outcome: Progressing     Problem: Musculoskeletal - Adult  Goal: Return ADL status to a safe level of
Problem: Safety - Adult  Goal: Free from fall injury  8/3/2023 1549 by Yusuf Wharton RN  Outcome: Progressing  8/3/2023 0625 by Erick Connelly RN  Outcome: Progressing
injury  Outcome: Progressing     Problem: ABCDS Injury Assessment  Goal: Absence of physical injury  Outcome: Progressing     Problem: Skin/Tissue Integrity  Goal: Absence of new skin breakdown  Description: 1. Monitor for areas of redness and/or skin breakdown  2. Assess vascular access sites hourly  3. Every 4-6 hours minimum:  Change oxygen saturation probe site  4. Every 4-6 hours:  If on nasal continuous positive airway pressure, respiratory therapy assess nares and determine need for appliance change or resting period.   Outcome: Progressing     Problem: Pain  Goal: Verbalizes/displays adequate comfort level or baseline comfort level  Outcome: Progressing
Progressing     Problem: Pain  Goal: Verbalizes/displays adequate comfort level or baseline comfort level  8/7/2023 1112 by Fauzia Jaramillo RN  Outcome: Progressing  8/7/2023 0306 by Zuleika Merchant RN  Outcome: Progressing

## 2023-08-09 NOTE — NURSE NAVIGATOR
Discharge order noted. Pt has had 60 minutes of education during this admission for her acute on chronic heart failure. She has a follow up appointment scheduled with in 7 days     8/14/2023 2:00 PM Kimberley Carlson, [address] [place] [address]   Her bedside RN will review this at the time of discharge. She has appropriate CHF instructions on her AVS, as well as the TOMEKA.

## 2023-08-09 NOTE — CARE COORDINATION
Case Management Discharge Note                                                                             Date / Time of Note: 8/892023 12:30PM                                                                                    Patient Name: Chandu Mullen   YOB: 1945  Diagnosis: Shortness of breath [R06.02]  Hypoxia [R09.02]  COPD exacerbation ([address]) [J44.1]  Hypertensive urgency [I16.0]  Elevated brain natriuretic peptide (BNP) level [R79.89]  Bilateral lower extremity edema [R60.0]  Acute on chronic respiratory failure ([address]) [J96.20]   Date / Time: 7/31/2023  9:12 AM     Financial:  Payor: Laura Postal / Plan: MEDICARE PART A AND B / Product Type: *No Product type* /       Pharmacy:     [facility] [id number] - [address] [facility] [address], [address] [place] [address] [phone] - F [phone]  [facility]  [address]  Phone: [phone] Fax: [phone]        Assistance purchasing medications?: Potential Assistance Purchasing Medications: No  Assistance provided by Case Management: None at this time     DISCHARGE Disposition: Home with [address]:  [address] [place] [address] ordered at discharge: Yes  [address]: [address] [place] [address]  Phone: [phone]  Fax: [phone]  Orders faxed: Yes        Home Oxygen and Respiratory Equipment:     Portable tank available for discharge?: Yes  bringing from home. Transportation:  Transportation PLAN for discharge: family   Mode of Transport: Private Car     IMM Completed:   Yes, Case management has presented and reviewed IMM letter #2. IMM Letter given to Patient/Family/Significant other/Guardian/POA/by[de-identified] Education provided to patient, patient reported no questions and verbalized understanding. Patient aware of 4 hours allotted time to determine if they choose to pursue Medicare appeal process. IMM Letter date given[de-identified] 08/08/23  IMM Letter time given[de-identified] 1250.    Patient and/or family/POA verbalized understanding of their medicare

## 2023-08-11 ENCOUNTER — TELEPHONE (OUTPATIENT)
Dept: FAMILY MEDICINE CLINIC | Age: 78
End: 2023-08-11

## 2023-08-15 ENCOUNTER — TELEPHONE (OUTPATIENT)
Dept: CARDIOLOGY CLINIC | Age: 78
End: 2023-08-15

## 2023-08-15 NOTE — TELEPHONE ENCOUNTER
Juanjo Cruz with [facility] called in this afternoon, she states patient is having shortness of breath, legs swollen really bad, her weight is 241.4 pounds when she came home from hospital on 8/9 she was 230 pounds the week before she was 227 pounds. She states just walking to get on the scale patient was winded. She can be reached at [phone].

## 2023-08-16 RX ORDER — METOLAZONE 2.5 MG/1
2.5 TABLET ORAL DAILY PRN
Qty: 30 TABLET | Refills: 3 | COMMUNITY
Start: 2023-08-16

## 2023-08-16 NOTE — TELEPHONE ENCOUNTER
Dr. Eder River NP is OOT but patient cancelled her HFU with CHANDA Chu on 8/14/2023 and has not rescheduled. However, please see below nursing update from [facility]. The patient's most recent CardioMEMS HF readings are from the day of discharge 8/9/2023: PAD 21, PA mean 33 but nothing since that time. Spoke with  Linda and states that the pillow is not working at home as to why they have not resumed daily PAD downloads. Will reach out to the Rep.

## 2023-08-16 NOTE — TELEPHONE ENCOUNTER
Looks like she is getting more fluid overloaded. Have her take Zaroxolyn 2.5 mg 30 minutes before a.m. dose of Demadex tomorrow and then Monday Wednesday and Friday.   Repeat BMP and BNP in 1 week

## 2023-08-16 NOTE — TELEPHONE ENCOUNTER
[facility] Nurse notified of Dr. Francisco Javier Roque recommendations. Contacted patient to inform of recommendations as well. Spoke with her  to notify (after further discussing with Dr. Evelyn Long) to take Metolazone 2.5 mg on Thursday, Friday of this week and MWF of next week, 30 minutes prior to am dose of Demadex. F/u on 8/18/23 rescheduled for Friday 8/25/23 at the [place] office. Mr. Karen Gómez verbalized and confirmed understanding. Medication list updated.

## 2023-08-18 ENCOUNTER — TELEPHONE (OUTPATIENT)
Dept: PULMONOLOGY | Age: 78
End: 2023-08-18

## 2023-08-18 ENCOUNTER — CLINICAL DOCUMENTATION ONLY (OUTPATIENT)
Facility: CLINIC | Age: 78
End: 2023-08-18

## 2023-08-18 NOTE — TELEPHONE ENCOUNTER
Pt states while she was in the hospital she was on a BiPAP  She states she is at home now and wants to know if you have ordered her machine to use at home    Please advise?

## 2023-08-22 NOTE — TELEPHONE ENCOUNTER
Phone call to patient who stated she has visiting RN's coming to her home on Thursday. She will see what she can work out.

## 2023-08-22 NOTE — TELEPHONE ENCOUNTER
Called pt  she sates she thought it was Dr Lasha Munguia through d/c notes Dr Shiv Gerber:  Change steroid to PO  Bronchodilators  On anticoagulation  Titrate oxygen to keep sats more than 90%  Encourage bipap use Qhs, if compliant will arrange as out pt.

## 2023-08-24 ENCOUNTER — TELEPHONE (OUTPATIENT)
Dept: CARDIOLOGY CLINIC | Age: 78
End: 2023-08-24

## 2023-08-24 LAB
ANION GAP SERPL CALCULATED.3IONS-SCNC: 8 MMOL/L (ref 3–16)
BUN SERPL-MCNC: 30 MG/DL (ref 7–20)
CALCIUM SERPL-MCNC: 9 MG/DL (ref 8.3–10.6)
CHLORIDE SERPL-SCNC: 88 MMOL/L (ref 99–110)
CO2 SERPL-SCNC: 46 MMOL/L (ref 21–32)
CREAT SERPL-MCNC: 2 MG/DL (ref 0.6–1.2)
GFR SERPLBLD CREATININE-BSD FMLA CKD-EPI: 25 ML/MIN/{1.73_M2}
GLUCOSE SERPL-MCNC: 151 MG/DL (ref 70–99)
NT-PROBNP SERPL-MCNC: 4171 PG/ML (ref 0–449)
POTASSIUM SERPL-SCNC: 3.1 MMOL/L (ref 3.5–5.1)
SODIUM SERPL-SCNC: 142 MMOL/L (ref 136–145)

## 2023-08-24 RX ORDER — POTASSIUM CHLORIDE 20 MEQ/1
TABLET, EXTENDED RELEASE ORAL
Qty: 30 TABLET | Refills: 5 | Status: SHIPPED | OUTPATIENT
Start: 2023-08-24 | End: 2023-08-25

## 2023-08-24 NOTE — TELEPHONE ENCOUNTER
Reviewed labs with CHRISTINE Frazier CNP. She instructed to call patient to see if she had been taking PRN metolazone and if so instruct to take Potassium 20 meq when taking Metolazone. Called spoke with patient and  she has been taking Metolazone PRN and had it his AM.  Advised of lab results and need for potassium supplement when taking PRN Metolazone. Verbalized understanding. RX sent to [id number].

## 2023-08-25 DIAGNOSIS — E87.3 METABOLIC ALKALOSIS: Primary | ICD-10-CM

## 2023-08-25 DIAGNOSIS — E86.1 INTRAVASCULAR VOLUME DEPLETION: ICD-10-CM

## 2023-08-25 RX ORDER — ACETAZOLAMIDE 125 MG/1
125 TABLET ORAL 2 TIMES DAILY
Qty: 60 TABLET | Refills: 6 | Status: SHIPPED | OUTPATIENT
Start: 2023-08-25

## 2023-08-25 RX ORDER — POTASSIUM CHLORIDE 20 MEQ/1
40 TABLET, EXTENDED RELEASE ORAL DAILY
Qty: 60 TABLET | Refills: 5 | Status: SHIPPED | OUTPATIENT
Start: 2023-08-25

## 2023-08-25 RX ORDER — TORSEMIDE 20 MG/1
20 TABLET ORAL DAILY
Qty: 30 TABLET | Refills: 11
Start: 2023-08-25

## 2023-08-31 DIAGNOSIS — I50.33 ACUTE ON CHRONIC DIASTOLIC HEART FAILURE (HCC): ICD-10-CM

## 2023-08-31 DIAGNOSIS — E87.8 ELECTROLYTE DISTURBANCE: Primary | ICD-10-CM

## 2023-08-31 DIAGNOSIS — N18.9 ACUTE KIDNEY INJURY SUPERIMPOSED ON CKD (HCC): ICD-10-CM

## 2023-08-31 DIAGNOSIS — N17.9 ACUTE KIDNEY INJURY SUPERIMPOSED ON CKD (HCC): ICD-10-CM

## 2023-08-31 LAB
ANION GAP SERPL CALCULATED.3IONS-SCNC: 6 MMOL/L (ref 4–16)
B-TYPE NATRIURETIC PEPTIDE: 226 PG/ML (ref 0–95)
BUN BLDV-MCNC: 32 MG/DL (ref 8–26)
CALCIUM SERPL-MCNC: 9.3 MG/DL (ref 8.5–10.4)
CHLORIDE BLD-SCNC: 89 MEQ/L (ref 98–111)
CO2: 43 MMOL/L (ref 21–31)
CREAT SERPL-MCNC: 1.77 MG/DL (ref 0.6–1.2)
EGFR (CKD-EPI): 29 ML/MIN/1.73 M2
GLUCOSE BLD-MCNC: 109 MG/DL (ref 70–99)
POTASSIUM SERPL-SCNC: 4.4 MEQ/L (ref 3.6–5.1)
SODIUM BLD-SCNC: 138 MEQ/L (ref 135–145)

## 2023-09-01 ENCOUNTER — TELEPHONE (OUTPATIENT)
Dept: OTHER | Facility: CLINIC | Age: 78
End: 2023-09-01

## 2023-09-01 ENCOUNTER — HOSPITAL ENCOUNTER (INPATIENT)
Age: 78
LOS: 16 days | Discharge: HOME | DRG: 291 | End: 2023-09-17
Attending: EMERGENCY MEDICINE | Admitting: HOSPITALIST
Payer: MEDICARE

## 2023-09-01 ENCOUNTER — APPOINTMENT (OUTPATIENT)
Dept: GENERAL RADIOLOGY | Age: 78
DRG: 291 | End: 2023-09-01
Payer: MEDICARE

## 2023-09-01 ENCOUNTER — OFFICE VISIT (OUTPATIENT)
Dept: CARDIOLOGY CLINIC | Age: 78
End: 2023-09-01
Payer: MEDICARE

## 2023-09-01 VITALS
OXYGEN SATURATION: 96 % | BODY MASS INDEX: 40.04 KG/M2 | HEART RATE: 56 BPM | HEIGHT: 63 IN | WEIGHT: 226 LBS | SYSTOLIC BLOOD PRESSURE: 136 MMHG | DIASTOLIC BLOOD PRESSURE: 60 MMHG

## 2023-09-01 DIAGNOSIS — E11.29 TYPE 2 DIABETES MELLITUS WITH OTHER DIABETIC KIDNEY COMPLICATION, WITH LONG-TERM CURRENT USE OF INSULIN (HCC): ICD-10-CM

## 2023-09-01 DIAGNOSIS — Z79.4 TYPE 2 DIABETES MELLITUS WITH OTHER DIABETIC KIDNEY COMPLICATION, WITH LONG-TERM CURRENT USE OF INSULIN (HCC): ICD-10-CM

## 2023-09-01 DIAGNOSIS — I50.33 ACUTE ON CHRONIC DIASTOLIC HEART FAILURE (HCC): Primary | ICD-10-CM

## 2023-09-01 DIAGNOSIS — I50.9 ACUTE ON CHRONIC CONGESTIVE HEART FAILURE, UNSPECIFIED HEART FAILURE TYPE (HCC): ICD-10-CM

## 2023-09-01 DIAGNOSIS — N18.32 CHRONIC RENAL FAILURE, STAGE 3B (HCC): ICD-10-CM

## 2023-09-01 DIAGNOSIS — I21.4 NON-STEMI (NON-ST ELEVATED MYOCARDIAL INFARCTION) (HCC): Primary | ICD-10-CM

## 2023-09-01 PROBLEM — E11.9 TYPE 2 DIABETES MELLITUS (HCC): Status: ACTIVE | Noted: 2023-09-01

## 2023-09-01 PROBLEM — R06.02 SOB (SHORTNESS OF BREATH): Status: ACTIVE | Noted: 2023-09-01

## 2023-09-01 LAB
ALBUMIN SERPL-MCNC: 3.8 G/DL (ref 3.4–5)
ALBUMIN/GLOB SERPL: 1.4 {RATIO} (ref 1.1–2.2)
ALP SERPL-CCNC: 86 U/L (ref 40–129)
ALT SERPL-CCNC: 7 U/L (ref 10–40)
ANION GAP SERPL CALCULATED.3IONS-SCNC: 8 MMOL/L (ref 3–16)
AST SERPL-CCNC: 20 U/L (ref 15–37)
BASE EXCESS BLDV CALC-SCNC: 10.9 MMOL/L (ref -3–3)
BASOPHILS # BLD: 0 K/UL (ref 0–0.2)
BASOPHILS NFR BLD: 0.4 %
BILIRUB SERPL-MCNC: 0.5 MG/DL (ref 0–1)
BUN SERPL-MCNC: 29 MG/DL (ref 7–20)
CALCIUM SERPL-MCNC: 10 MG/DL (ref 8.3–10.6)
CHLORIDE SERPL-SCNC: 91 MMOL/L (ref 99–110)
CO2 BLDV-SCNC: 36 MMOL/L
CO2 SERPL-SCNC: 36 MMOL/L (ref 21–32)
CREAT SERPL-MCNC: 1.7 MG/DL (ref 0.6–1.2)
DEPRECATED RDW RBC AUTO: 16 % (ref 12.4–15.4)
EOSINOPHIL # BLD: 0.4 K/UL (ref 0–0.6)
EOSINOPHIL NFR BLD: 3.6 %
GFR SERPLBLD CREATININE-BSD FMLA CKD-EPI: 31 ML/MIN/{1.73_M2}
GLUCOSE SERPL-MCNC: 112 MG/DL (ref 70–99)
HCO3 BLDV-SCNC: 36.3 MMOL/L (ref 23–29)
HCT VFR BLD AUTO: 33.3 % (ref 36–48)
HGB BLD-MCNC: 9.8 G/DL (ref 12–16)
IMM GRANULOCYTES # BLD: 0 K/UL (ref 0–0.2)
IMM GRANULOCYTES NFR BLD: 0.4 %
LACTATE BLDV-SCNC: 0.7 MMOL/L (ref 0.4–1.9)
LYMPHOCYTES # BLD: 0.7 K/UL (ref 1–5.1)
LYMPHOCYTES NFR BLD: 7.2 %
MCH RBC QN AUTO: 28.5 PG (ref 26–34)
MCHC RBC AUTO-ENTMCNC: 29.4 G/DL (ref 32–36.4)
MCV RBC AUTO: 96.8 FL (ref 80–100)
MONOCYTES # BLD: 0.7 K/UL (ref 0–1.3)
MONOCYTES NFR BLD: 6.7 %
NEUTROPHILS # BLD: 8.1 K/UL (ref 1.7–7.7)
NEUTROPHILS NFR BLD: 81.7 %
NT-PROBNP SERPL-MCNC: 4012 PG/ML (ref 0–449)
O2 THERAPY: ABNORMAL
PCO2 BLDV: 64.4 MMHG (ref 40–50)
PH BLDV: 7.36 [PH] (ref 7.35–7.45)
PLATELET # BLD AUTO: 271 K/UL (ref 135–450)
PMV BLD AUTO: 11 FL (ref 5–10.5)
PO2 BLDV: 53 MMHG (ref 25–40)
POTASSIUM SERPL-SCNC: 4.2 MMOL/L (ref 3.5–5.1)
PROT SERPL-MCNC: 6.6 G/DL (ref 6.4–8.2)
RBC # BLD AUTO: 3.44 M/UL (ref 4–5.2)
SAO2 % BLDV: 84 %
SODIUM SERPL-SCNC: 135 MMOL/L (ref 136–145)
TROPONIN, HIGH SENSITIVITY: 100 NG/L (ref 0–14)
TROPONIN, HIGH SENSITIVITY: 99 NG/L (ref 0–14)
WBC # BLD AUTO: 9.9 K/UL (ref 4–11)

## 2023-09-01 PROCEDURE — 1111F DSCHRG MED/CURRENT MED MERGE: CPT | Performed by: INTERNAL MEDICINE

## 2023-09-01 PROCEDURE — 6370000000 HC RX 637 (ALT 250 FOR IP): Performed by: EMERGENCY MEDICINE

## 2023-09-01 PROCEDURE — 36415 COLL VENOUS BLD VENIPUNCTURE: CPT

## 2023-09-01 PROCEDURE — 87040 BLOOD CULTURE FOR BACTERIA: CPT

## 2023-09-01 PROCEDURE — 96374 THER/PROPH/DIAG INJ IV PUSH: CPT

## 2023-09-01 PROCEDURE — G8427 DOCREV CUR MEDS BY ELIG CLIN: HCPCS | Performed by: INTERNAL MEDICINE

## 2023-09-01 PROCEDURE — 84484 ASSAY OF TROPONIN QUANT: CPT

## 2023-09-01 PROCEDURE — 99285 EMERGENCY DEPT VISIT HI MDM: CPT

## 2023-09-01 PROCEDURE — 6360000002 HC RX W HCPCS: Performed by: EMERGENCY MEDICINE

## 2023-09-01 PROCEDURE — 3075F SYST BP GE 130 - 139MM HG: CPT | Performed by: INTERNAL MEDICINE

## 2023-09-01 PROCEDURE — 1090F PRES/ABSN URINE INCON ASSESS: CPT | Performed by: INTERNAL MEDICINE

## 2023-09-01 PROCEDURE — G8399 PT W/DXA RESULTS DOCUMENT: HCPCS | Performed by: INTERNAL MEDICINE

## 2023-09-01 PROCEDURE — 3078F DIAST BP <80 MM HG: CPT | Performed by: INTERNAL MEDICINE

## 2023-09-01 PROCEDURE — 1123F ACP DISCUSS/DSCN MKR DOCD: CPT | Performed by: INTERNAL MEDICINE

## 2023-09-01 PROCEDURE — 83880 ASSAY OF NATRIURETIC PEPTIDE: CPT

## 2023-09-01 PROCEDURE — 80053 COMPREHEN METABOLIC PANEL: CPT

## 2023-09-01 PROCEDURE — 82803 BLOOD GASES ANY COMBINATION: CPT

## 2023-09-01 PROCEDURE — G8417 CALC BMI ABV UP PARAM F/U: HCPCS | Performed by: INTERNAL MEDICINE

## 2023-09-01 PROCEDURE — 1036F TOBACCO NON-USER: CPT | Performed by: INTERNAL MEDICINE

## 2023-09-01 PROCEDURE — 71045 X-RAY EXAM CHEST 1 VIEW: CPT

## 2023-09-01 PROCEDURE — 99215 OFFICE O/P EST HI 40 MIN: CPT | Performed by: INTERNAL MEDICINE

## 2023-09-01 PROCEDURE — 85025 COMPLETE CBC W/AUTO DIFF WBC: CPT

## 2023-09-01 PROCEDURE — 2060000000 HC ICU INTERMEDIATE R&B

## 2023-09-01 PROCEDURE — 93005 ELECTROCARDIOGRAM TRACING: CPT | Performed by: EMERGENCY MEDICINE

## 2023-09-01 PROCEDURE — 83605 ASSAY OF LACTIC ACID: CPT

## 2023-09-01 RX ORDER — FAMOTIDINE 20 MG/1
20 TABLET, FILM COATED ORAL 2 TIMES DAILY
Status: CANCELLED | OUTPATIENT
Start: 2023-09-01

## 2023-09-01 RX ORDER — FUROSEMIDE 10 MG/ML
40 INJECTION INTRAMUSCULAR; INTRAVENOUS ONCE
Status: COMPLETED | OUTPATIENT
Start: 2023-09-01 | End: 2023-09-01

## 2023-09-01 RX ORDER — ASPIRIN 81 MG/1
324 TABLET, CHEWABLE ORAL ONCE
Status: COMPLETED | OUTPATIENT
Start: 2023-09-01 | End: 2023-09-01

## 2023-09-01 RX ORDER — METOPROLOL SUCCINATE 25 MG/1
25 TABLET, EXTENDED RELEASE ORAL DAILY
Status: CANCELLED | OUTPATIENT
Start: 2023-09-01

## 2023-09-01 RX ORDER — ENOXAPARIN SODIUM 100 MG/ML
30 INJECTION SUBCUTANEOUS 2 TIMES DAILY
Status: CANCELLED | OUTPATIENT
Start: 2023-09-01

## 2023-09-01 RX ADMIN — FUROSEMIDE 40 MG: 10 INJECTION, SOLUTION INTRAMUSCULAR; INTRAVENOUS at 18:17

## 2023-09-01 RX ADMIN — ASPIRIN 324 MG: 81 TABLET, CHEWABLE ORAL at 18:07

## 2023-09-01 RX ADMIN — NITROGLYCERIN 1 INCH: 20 OINTMENT TOPICAL at 19:13

## 2023-09-01 ASSESSMENT — PAIN - FUNCTIONAL ASSESSMENT
PAIN_FUNCTIONAL_ASSESSMENT: 0-10
PAIN_FUNCTIONAL_ASSESSMENT: NONE - DENIES PAIN

## 2023-09-01 ASSESSMENT — PAIN SCALES - GENERAL
PAINLEVEL_OUTOF10: 0

## 2023-09-01 ASSESSMENT — HEART SCORE: ECG: 0

## 2023-09-01 NOTE — ED NOTES
Pt.'s  notified of room number at [facility] and ETA of squad at , verbalized understanding.      Darell Orellana RN  09/01/23 1925

## 2023-09-01 NOTE — TELEPHONE ENCOUNTER
Writer contacted Dr Vitor Rossi to inform of 30 day readmission risk. Writer spoke with Dr Vitor Rossi who stated no decision on disposition at this time.       Call Back: If you need to call back to inform of disposition you can contact me at [phone]

## 2023-09-01 NOTE — PROGRESS NOTES
She did convert to sinus rhythm on 5/25/22 with improvement in symptoms. QTc remains acceptable today. She will be discharged on 250mcg of dofetilide Q12H with a BMP in 1 week.   -11/2022 admitted for acute on chronic respiratory failure, acute on chronic diastolic heart failure went into symptomatic rapid atrial fibrillation.   -physical exam as above with regular rate and rhythm. -remains oxygen therapy per NC 3-4L   -Was recently admitted again with GI bleed. She received blood transfusions and her Xarelto was put on hold. She is back on her Xarelto. -She appears too frail to be considered for watchman therapy    Hyperlipidemia  -Continue Crestor 20 mg daily     Severe COPD/Asthma/chronic respiratory failure    -Managed per Dr. Meenu Moulton.  -Following with pulmonology. AAA (abdominal aortic aneurysm) without rupture   -Underwent endovascular AAA repair on 3/21/18 by Dr. Winsome Baird vascular surgery    Sleep apnea  -Intolerant to CPAP. Chronic anemia/GI bleed  Recently admitted for GI bleed and required 3 units of blood  -She was diagnosed with grade B esophagitis as well as AVM    6/16/23: Recent hospitalization for GI bleed. She denies any recurrent bleeding issues. Continue Xarelto 15 mg daily. No longer taking Plavix. Repeat CBC prior to follow up in 2 weeks. Follow up after her ER evaluation and possible admission to the hospital    Thank you very much for allowing me to participate in the care of your patient. Please do not hesitate to contact me if you have any questions. Sincerely,  Heather Wilder MD      [address] [place] [address]. , [address] [facility], [address] [place] [address]  Ph: [phone]  Fax: [phone]  Physician Attestation:  The scribes documentation has been prepared under my direction and personally reviewed by me in its entirety.      I confirm that the note above accurately reflects all work, treatment, procedures, and medical decision

## 2023-09-01 NOTE — ED PROVIDER NOTES
CHIEF COMPLAINT  Chief Complaint   Patient presents with    Shortness of Breath     Pt. Was at Dr. Grady Swann office for LIFESCAPE, sent to ED       HISTORY OF PRESENT ILLNESS  Felisa Guzman is a 69 y.o. female who presents to the ED complaining of 2 to 3-day history of increasing shortness of breath, weight gain and lower extremity edema. Patient's on continuous home oxygen. Patient reports that she quit smoking several years ago. Patient denies chest pain, jaw or arm pain. No arm paresthesias. Patient was sent over to the emergency room by Dr. Grady Swann office with the intention that the patient needed to be admitted for CHF exacerbation. No palpitations. No abdominal pain. No diaphoresis. No nausea or vomiting. No diarrhea. No fevers or chills. Patient is currently on rivaroxaban and does have a history of DVT previously as well as a history of atrial fibrillation. No other complaints, modifying factors or associated symptoms. Nursing notes reviewed.    Past Medical History:   Diagnosis Date    AAA (abdominal aortic aneurysm) ([address])     pt states it is 4cm    AAA (abdominal aortic aneurysm) without rupture (HCC) 2/10/2015    Atrial fibrillation (HCC)     CAD (coronary artery disease)     CHF (congestive heart failure) (HCC)     COPD (chronic obstructive pulmonary disease) ([address])     History of blood clots     Hyperlipidemia     Hypertension      Past Surgical History:   Procedure Laterality Date    ABDOMINAL AORTIC ANEURYSM REPAIR      Endovascular abdominal AA    APPENDECTOMY  1990    incidental    BLADDER SUSPENSION      CAPSULE ENDOSCOPY N/A 5/22/2023    ESOPHAGEAL CAPSULE ENDOSCOPY performed by Nori Bui MD at [address] [place] [address] Right 11/28/2022    Cardiomems PA sensor device implant Left PA    CARDIAC SURGERY      CATARACT REMOVAL      CHOLECYSTECTOMY  10/15/2013    COLECTOMY N/A 5/26/2023    LAPAROSCOPIC ASSISTED LYSIS OF ADHESIONS AND PUSH ENTEROSCOPY

## 2023-09-02 PROBLEM — I21.4 NON-STEMI (NON-ST ELEVATED MYOCARDIAL INFARCTION) (HCC): Status: ACTIVE | Noted: 2023-09-02

## 2023-09-02 PROBLEM — I50.9 ACUTE CONGESTIVE HEART FAILURE, UNSPECIFIED HEART FAILURE TYPE (HCC): Status: ACTIVE | Noted: 2023-09-02

## 2023-09-02 LAB
ANION GAP SERPL CALCULATED.3IONS-SCNC: 9 MMOL/L (ref 3–16)
BASE EXCESS BLDA CALC-SCNC: 12.9 MMOL/L (ref -3–3)
BASE EXCESS BLDV CALC-SCNC: 12 MMOL/L
BASE EXCESS BLDV CALC-SCNC: 13.2 MMOL/L
BASOPHILS # BLD: 0 K/UL (ref 0–0.2)
BASOPHILS NFR BLD: 0.6 %
BUN SERPL-MCNC: 26 MG/DL (ref 7–20)
CALCIUM SERPL-MCNC: 9.5 MG/DL (ref 8.3–10.6)
CHLORIDE SERPL-SCNC: 92 MMOL/L (ref 99–110)
CHOLEST SERPL-MCNC: 120 MG/DL (ref 0–199)
CO2 BLDA-SCNC: 42.3 MMOL/L
CO2 BLDV-SCNC: 40 MMOL/L
CO2 BLDV-SCNC: 41 MMOL/L
CO2 SERPL-SCNC: 38 MMOL/L (ref 21–32)
COHGB MFR BLDA: 1.8 % (ref 0–1.5)
COHGB MFR BLDV: 1.9 %
COHGB MFR BLDV: 2.1 %
CREAT SERPL-MCNC: 1.6 MG/DL (ref 0.6–1.2)
DEPRECATED RDW RBC AUTO: 17 % (ref 12.4–15.4)
EKG ATRIAL RATE: 53 BPM
EKG DIAGNOSIS: NORMAL
EKG P AXIS: 50 DEGREES
EKG P-R INTERVAL: 208 MS
EKG Q-T INTERVAL: 492 MS
EKG QRS DURATION: 80 MS
EKG QTC CALCULATION (BAZETT): 461 MS
EKG R AXIS: -14 DEGREES
EKG T AXIS: 25 DEGREES
EKG VENTRICULAR RATE: 53 BPM
EOSINOPHIL # BLD: 0.3 K/UL (ref 0–0.6)
EOSINOPHIL NFR BLD: 4 %
GFR SERPLBLD CREATININE-BSD FMLA CKD-EPI: 33 ML/MIN/{1.73_M2}
GLUCOSE BLD-MCNC: 142 MG/DL (ref 70–99)
GLUCOSE SERPL-MCNC: 87 MG/DL (ref 70–99)
HCO3 BLDA-SCNC: 40.2 MMOL/L (ref 21–29)
HCO3 BLDV-SCNC: 39 MMOL/L (ref 23–29)
HCO3 BLDV-SCNC: 39 MMOL/L (ref 23–29)
HCT VFR BLD AUTO: 29.8 % (ref 36–48)
HDLC SERPL-MCNC: 57 MG/DL (ref 40–60)
HGB BLD-MCNC: 9.8 G/DL (ref 12–16)
HGB BLDA-MCNC: 8.7 G/DL (ref 12–16)
INR PPP: 1.55 (ref 0.84–1.16)
IRON SATN MFR SERPL: 25 % (ref 15–50)
IRON SERPL-MCNC: 55 UG/DL (ref 37–145)
LDLC SERPL CALC-MCNC: 43 MG/DL
LYMPHOCYTES # BLD: 0.7 K/UL (ref 1–5.1)
LYMPHOCYTES NFR BLD: 8.2 %
MAGNESIUM SERPL-MCNC: 2.4 MG/DL (ref 1.8–2.4)
MCH RBC QN AUTO: 28.9 PG (ref 26–34)
MCHC RBC AUTO-ENTMCNC: 32.8 G/DL (ref 31–36)
MCV RBC AUTO: 88 FL (ref 80–100)
METHGB MFR BLDA: 0.6 %
METHGB MFR BLDV: 0.4 %
METHGB MFR BLDV: 0.6 %
MONOCYTES # BLD: 0.5 K/UL (ref 0–1.3)
MONOCYTES NFR BLD: 6.5 %
NEUTROPHILS # BLD: 6.6 K/UL (ref 1.7–7.7)
NEUTROPHILS NFR BLD: 80.7 %
O2 THERAPY: ABNORMAL
PCO2 BLDA: 69.7 MMHG (ref 35–45)
PCO2 BLDV: 53.3 MMHG (ref 40–50)
PCO2 BLDV: 63.4 MMHG (ref 40–50)
PERFORMED ON: ABNORMAL
PH BLDA: 7.37 [PH] (ref 7.35–7.45)
PH BLDV: 7.39 [PH] (ref 7.35–7.45)
PH BLDV: 7.47 [PH] (ref 7.35–7.45)
PLATELET # BLD AUTO: 238 K/UL (ref 135–450)
PMV BLD AUTO: 9.5 FL (ref 5–10.5)
PO2 BLDA: 113 MMHG (ref 75–108)
PO2 BLDV: 105 MMHG
PO2 BLDV: 70 MMHG
POTASSIUM SERPL-SCNC: 3.7 MMOL/L (ref 3.5–5.1)
PROTHROMBIN TIME: 18.5 SEC (ref 11.5–14.8)
RBC # BLD AUTO: 3.39 M/UL (ref 4–5.2)
SAO2 % BLDA: 99 %
SAO2 % BLDV: 95 %
SAO2 % BLDV: 99 %
SODIUM SERPL-SCNC: 139 MMOL/L (ref 136–145)
TIBC SERPL-MCNC: 222 UG/DL (ref 260–445)
TRIGL SERPL-MCNC: 98 MG/DL (ref 0–150)
TROPONIN, HIGH SENSITIVITY: 104 NG/L (ref 0–14)
TROPONIN, HIGH SENSITIVITY: 107 NG/L (ref 0–14)
TROPONIN, HIGH SENSITIVITY: 96 NG/L (ref 0–14)
TROPONIN, HIGH SENSITIVITY: 99 NG/L (ref 0–14)
TSH SERPL DL<=0.005 MIU/L-ACNC: 1.71 UIU/ML (ref 0.27–4.2)
VLDLC SERPL CALC-MCNC: 20 MG/DL
WBC # BLD AUTO: 8.2 K/UL (ref 4–11)

## 2023-09-02 PROCEDURE — 82803 BLOOD GASES ANY COMBINATION: CPT

## 2023-09-02 PROCEDURE — 85025 COMPLETE CBC W/AUTO DIFF WBC: CPT

## 2023-09-02 PROCEDURE — 80048 BASIC METABOLIC PNL TOTAL CA: CPT

## 2023-09-02 PROCEDURE — 2580000003 HC RX 258: Performed by: HOSPITALIST

## 2023-09-02 PROCEDURE — 6360000002 HC RX W HCPCS: Performed by: HOSPITALIST

## 2023-09-02 PROCEDURE — 83735 ASSAY OF MAGNESIUM: CPT

## 2023-09-02 PROCEDURE — 5A09457 ASSISTANCE WITH RESPIRATORY VENTILATION, 24-96 CONSECUTIVE HOURS, CONTINUOUS POSITIVE AIRWAY PRESSURE: ICD-10-PCS | Performed by: HOSPITALIST

## 2023-09-02 PROCEDURE — 83540 ASSAY OF IRON: CPT

## 2023-09-02 PROCEDURE — 80061 LIPID PANEL: CPT

## 2023-09-02 PROCEDURE — 94640 AIRWAY INHALATION TREATMENT: CPT

## 2023-09-02 PROCEDURE — 94660 CPAP INITIATION&MGMT: CPT

## 2023-09-02 PROCEDURE — 36600 WITHDRAWAL OF ARTERIAL BLOOD: CPT

## 2023-09-02 PROCEDURE — 94761 N-INVAS EAR/PLS OXIMETRY MLT: CPT

## 2023-09-02 PROCEDURE — 84484 ASSAY OF TROPONIN QUANT: CPT

## 2023-09-02 PROCEDURE — 83550 IRON BINDING TEST: CPT

## 2023-09-02 PROCEDURE — 36415 COLL VENOUS BLD VENIPUNCTURE: CPT

## 2023-09-02 PROCEDURE — 85610 PROTHROMBIN TIME: CPT

## 2023-09-02 PROCEDURE — 93010 ELECTROCARDIOGRAM REPORT: CPT | Performed by: INTERNAL MEDICINE

## 2023-09-02 PROCEDURE — 2060000000 HC ICU INTERMEDIATE R&B

## 2023-09-02 PROCEDURE — 6370000000 HC RX 637 (ALT 250 FOR IP): Performed by: HOSPITALIST

## 2023-09-02 PROCEDURE — 84443 ASSAY THYROID STIM HORMONE: CPT

## 2023-09-02 PROCEDURE — 2700000000 HC OXYGEN THERAPY PER DAY

## 2023-09-02 PROCEDURE — 99223 1ST HOSP IP/OBS HIGH 75: CPT | Performed by: INTERNAL MEDICINE

## 2023-09-02 RX ORDER — POTASSIUM CHLORIDE 7.45 MG/ML
10 INJECTION INTRAVENOUS PRN
Status: DISCONTINUED | OUTPATIENT
Start: 2023-09-02 | End: 2023-09-07

## 2023-09-02 RX ORDER — POTASSIUM CHLORIDE 20 MEQ/1
40 TABLET, EXTENDED RELEASE ORAL DAILY
Status: DISCONTINUED | OUTPATIENT
Start: 2023-09-02 | End: 2023-09-16

## 2023-09-02 RX ORDER — IPRATROPIUM BROMIDE AND ALBUTEROL SULFATE 2.5; .5 MG/3ML; MG/3ML
1 SOLUTION RESPIRATORY (INHALATION)
Status: DISCONTINUED | OUTPATIENT
Start: 2023-09-02 | End: 2023-09-12

## 2023-09-02 RX ORDER — FUROSEMIDE 10 MG/ML
40 INJECTION INTRAMUSCULAR; INTRAVENOUS 2 TIMES DAILY
Status: DISCONTINUED | OUTPATIENT
Start: 2023-09-02 | End: 2023-09-03

## 2023-09-02 RX ORDER — CARVEDILOL 6.25 MG/1
6.25 TABLET ORAL 2 TIMES DAILY WITH MEALS
Status: DISCONTINUED | OUTPATIENT
Start: 2023-09-02 | End: 2023-09-17 | Stop reason: HOSPADM

## 2023-09-02 RX ORDER — METOLAZONE 5 MG/1
2.5 TABLET ORAL DAILY PRN
Status: DISCONTINUED | OUTPATIENT
Start: 2023-09-02 | End: 2023-09-17 | Stop reason: HOSPADM

## 2023-09-02 RX ORDER — DILTIAZEM HYDROCHLORIDE 120 MG/1
120 CAPSULE, COATED, EXTENDED RELEASE ORAL DAILY
Status: DISCONTINUED | OUTPATIENT
Start: 2023-09-02 | End: 2023-09-08

## 2023-09-02 RX ORDER — FERROUS SULFATE TAB EC 324 MG (65 MG FE EQUIVALENT) 324 (65 FE) MG
324 TABLET DELAYED RESPONSE ORAL 2 TIMES DAILY WITH MEALS
Status: DISCONTINUED | OUTPATIENT
Start: 2023-09-02 | End: 2023-09-17 | Stop reason: HOSPADM

## 2023-09-02 RX ORDER — ONDANSETRON 4 MG/1
4 TABLET, ORALLY DISINTEGRATING ORAL EVERY 8 HOURS PRN
Status: DISCONTINUED | OUTPATIENT
Start: 2023-09-02 | End: 2023-09-17 | Stop reason: HOSPADM

## 2023-09-02 RX ORDER — ACETAMINOPHEN 650 MG/1
650 SUPPOSITORY RECTAL EVERY 6 HOURS PRN
Status: DISCONTINUED | OUTPATIENT
Start: 2023-09-02 | End: 2023-09-17 | Stop reason: HOSPADM

## 2023-09-02 RX ORDER — ACETAMINOPHEN 325 MG/1
650 TABLET ORAL EVERY 6 HOURS PRN
Status: DISCONTINUED | OUTPATIENT
Start: 2023-09-02 | End: 2023-09-02 | Stop reason: SDUPTHER

## 2023-09-02 RX ORDER — ALBUTEROL SULFATE 2.5 MG/3ML
0.63 SOLUTION RESPIRATORY (INHALATION) EVERY 4 HOURS PRN
Status: DISCONTINUED | OUTPATIENT
Start: 2023-09-02 | End: 2023-09-17 | Stop reason: HOSPADM

## 2023-09-02 RX ORDER — POLYETHYLENE GLYCOL 3350 17 G/17G
17 POWDER, FOR SOLUTION ORAL DAILY PRN
Status: DISCONTINUED | OUTPATIENT
Start: 2023-09-02 | End: 2023-09-17 | Stop reason: HOSPADM

## 2023-09-02 RX ORDER — ACETAMINOPHEN 325 MG/1
650 TABLET ORAL EVERY 6 HOURS PRN
Status: DISCONTINUED | OUTPATIENT
Start: 2023-09-02 | End: 2023-09-17 | Stop reason: HOSPADM

## 2023-09-02 RX ORDER — ACETAZOLAMIDE 250 MG/1
125 TABLET ORAL 2 TIMES DAILY
Status: DISCONTINUED | OUTPATIENT
Start: 2023-09-02 | End: 2023-09-07

## 2023-09-02 RX ORDER — MAGNESIUM SULFATE IN WATER 40 MG/ML
2000 INJECTION, SOLUTION INTRAVENOUS PRN
Status: DISCONTINUED | OUTPATIENT
Start: 2023-09-02 | End: 2023-09-07

## 2023-09-02 RX ORDER — SODIUM CHLORIDE 0.9 % (FLUSH) 0.9 %
5-40 SYRINGE (ML) INJECTION PRN
Status: DISCONTINUED | OUTPATIENT
Start: 2023-09-02 | End: 2023-09-17 | Stop reason: HOSPADM

## 2023-09-02 RX ORDER — ASCORBIC ACID 500 MG
500 TABLET ORAL DAILY
Status: DISCONTINUED | OUTPATIENT
Start: 2023-09-02 | End: 2023-09-17 | Stop reason: HOSPADM

## 2023-09-02 RX ORDER — PANTOPRAZOLE SODIUM 40 MG/1
40 TABLET, DELAYED RELEASE ORAL
Status: DISCONTINUED | OUTPATIENT
Start: 2023-09-02 | End: 2023-09-17 | Stop reason: HOSPADM

## 2023-09-02 RX ORDER — ROSUVASTATIN CALCIUM 20 MG/1
20 TABLET, COATED ORAL DAILY
Status: DISCONTINUED | OUTPATIENT
Start: 2023-09-02 | End: 2023-09-06

## 2023-09-02 RX ORDER — SODIUM CHLORIDE 9 MG/ML
INJECTION, SOLUTION INTRAVENOUS PRN
Status: DISCONTINUED | OUTPATIENT
Start: 2023-09-02 | End: 2023-09-17 | Stop reason: HOSPADM

## 2023-09-02 RX ORDER — ISOSORBIDE MONONITRATE 60 MG/1
120 TABLET, EXTENDED RELEASE ORAL DAILY
Status: DISCONTINUED | OUTPATIENT
Start: 2023-09-02 | End: 2023-09-17 | Stop reason: HOSPADM

## 2023-09-02 RX ORDER — RANOLAZINE 500 MG/1
500 TABLET, EXTENDED RELEASE ORAL 2 TIMES DAILY
Status: DISCONTINUED | OUTPATIENT
Start: 2023-09-02 | End: 2023-09-17 | Stop reason: HOSPADM

## 2023-09-02 RX ORDER — ONDANSETRON 2 MG/ML
4 INJECTION INTRAMUSCULAR; INTRAVENOUS EVERY 6 HOURS PRN
Status: DISCONTINUED | OUTPATIENT
Start: 2023-09-02 | End: 2023-09-17 | Stop reason: HOSPADM

## 2023-09-02 RX ORDER — LISINOPRIL 5 MG/1
5 TABLET ORAL DAILY
Status: DISCONTINUED | OUTPATIENT
Start: 2023-09-02 | End: 2023-09-02

## 2023-09-02 RX ORDER — FERROUS SULFATE TAB EC 324 MG (65 MG FE EQUIVALENT) 324 (65 FE) MG
325 TABLET DELAYED RESPONSE ORAL 2 TIMES DAILY WITH MEALS
Status: DISCONTINUED | OUTPATIENT
Start: 2023-09-02 | End: 2023-09-02

## 2023-09-02 RX ORDER — POTASSIUM CHLORIDE 20 MEQ/1
40 TABLET, EXTENDED RELEASE ORAL PRN
Status: DISCONTINUED | OUTPATIENT
Start: 2023-09-02 | End: 2023-09-07

## 2023-09-02 RX ORDER — SODIUM CHLORIDE 0.9 % (FLUSH) 0.9 %
5-40 SYRINGE (ML) INJECTION EVERY 12 HOURS SCHEDULED
Status: DISCONTINUED | OUTPATIENT
Start: 2023-09-02 | End: 2023-09-17 | Stop reason: HOSPADM

## 2023-09-02 RX ADMIN — FERROUS SULFATE TAB EC 324 MG (65 MG FE EQUIVALENT) 324 MG: 324 (65 FE) TABLET DELAYED RESPONSE at 07:47

## 2023-09-02 RX ADMIN — RIVAROXABAN 15 MG: 15 TABLET, FILM COATED ORAL at 07:43

## 2023-09-02 RX ADMIN — Medication 10 ML: at 09:23

## 2023-09-02 RX ADMIN — FERROUS SULFATE TAB EC 324 MG (65 MG FE EQUIVALENT) 324 MG: 324 (65 FE) TABLET DELAYED RESPONSE at 16:56

## 2023-09-02 RX ADMIN — ISOSORBIDE MONONITRATE 120 MG: 60 TABLET, EXTENDED RELEASE ORAL at 09:23

## 2023-09-02 RX ADMIN — EMPAGLIFLOZIN 10 MG: 10 TABLET, FILM COATED ORAL at 09:23

## 2023-09-02 RX ADMIN — METHYLPREDNISOLONE SODIUM SUCCINATE 40 MG: 40 INJECTION, POWDER, FOR SOLUTION INTRAMUSCULAR; INTRAVENOUS at 17:00

## 2023-09-02 RX ADMIN — FUROSEMIDE 40 MG: 10 INJECTION, SOLUTION INTRAMUSCULAR; INTRAVENOUS at 09:23

## 2023-09-02 RX ADMIN — TIOTROPIUM BROMIDE INHALATION SPRAY 2 PUFF: 3.12 SPRAY, METERED RESPIRATORY (INHALATION) at 08:09

## 2023-09-02 RX ADMIN — CARVEDILOL 6.25 MG: 6.25 TABLET, FILM COATED ORAL at 16:56

## 2023-09-02 RX ADMIN — MOMETASONE FUROATE AND FORMOTEROL FUMARATE DIHYDRATE 2 PUFF: 200; 5 AEROSOL RESPIRATORY (INHALATION) at 08:09

## 2023-09-02 RX ADMIN — DILTIAZEM HYDROCHLORIDE 120 MG: 120 CAPSULE, COATED, EXTENDED RELEASE ORAL at 09:24

## 2023-09-02 RX ADMIN — IPRATROPIUM BROMIDE AND ALBUTEROL SULFATE 1 DOSE: 2.5; .5 SOLUTION RESPIRATORY (INHALATION) at 20:15

## 2023-09-02 RX ADMIN — ACETAZOLAMIDE 125 MG: 250 TABLET ORAL at 09:25

## 2023-09-02 RX ADMIN — OXYCODONE HYDROCHLORIDE AND ACETAMINOPHEN 500 MG: 500 TABLET ORAL at 09:24

## 2023-09-02 RX ADMIN — SODIUM CHLORIDE, PRESERVATIVE FREE 10 ML: 5 INJECTION INTRAVENOUS at 23:58

## 2023-09-02 RX ADMIN — RANOLAZINE 500 MG: 500 TABLET, FILM COATED, EXTENDED RELEASE ORAL at 09:24

## 2023-09-02 RX ADMIN — PANTOPRAZOLE SODIUM 40 MG: 40 TABLET, DELAYED RELEASE ORAL at 07:43

## 2023-09-02 RX ADMIN — SODIUM CHLORIDE, PRESERVATIVE FREE 10 ML: 5 INJECTION INTRAVENOUS at 07:47

## 2023-09-02 RX ADMIN — POTASSIUM CHLORIDE 40 MEQ: 1500 TABLET, EXTENDED RELEASE ORAL at 09:24

## 2023-09-02 RX ADMIN — FUROSEMIDE 40 MG: 10 INJECTION, SOLUTION INTRAMUSCULAR; INTRAVENOUS at 17:00

## 2023-09-02 RX ADMIN — ROSUVASTATIN CALCIUM 20 MG: 20 TABLET, FILM COATED ORAL at 09:23

## 2023-09-02 RX ADMIN — IPRATROPIUM BROMIDE AND ALBUTEROL SULFATE 1 DOSE: 2.5; .5 SOLUTION RESPIRATORY (INHALATION) at 23:32

## 2023-09-02 RX ADMIN — CARVEDILOL 6.25 MG: 6.25 TABLET, FILM COATED ORAL at 07:43

## 2023-09-02 ASSESSMENT — PAIN SCALES - WONG BAKER
WONGBAKER_NUMERICALRESPONSE: 0

## 2023-09-02 ASSESSMENT — PAIN SCALES - GENERAL
PAINLEVEL_OUTOF10: 0

## 2023-09-02 NOTE — PROGRESS NOTES
Pt arrived to [address] [place] [address] from [facility] via transport on stretcher. VS stable 4L O2 applied pt calm and cooperative. Pt oriented to room and has no further questions or requests at this time. Will continue to monitor.

## 2023-09-02 NOTE — CONSULTS
HPI:  The patient is 69 y.o. female with a past medical history significant for CAD, heart failure, atrial fib, HTN, aortic aneurysm and COPD. Abnormal stress followed by [facility] 3/2/18 which revealed  of the RCA and nonobstructive CAD of the LAD and LCX. Endovascular AAA repair on 3/21/18 by Dr. Kenyon Hernandez. Admitted 2/25/20 for atrial fibrillation along with signs and symptoms of heart failure. She converted to sinus rhythm after she was started on amiodarone therapy. S/p PVI ablation 10/2020, recurrence s/p DCCV 1/12/21, repeat AFIB ablation 2/17/21. Admitted 5/24-5/26/22 atrial fibrillation for dofetilide loading after having multiple ablations, cardioversions (last ablation 2/4/22 ,CV 4/2022) and failing flecainide therapy. She was started on 250mcg of dofetilide Q12H. he did convert to sinus rhythm on 5/25/22 with improvement in symptoms. She was discharged on 250mcg of dofetilide. Abnormal Lexiscan followed by [facility] on 9/20/22 per Dr. Renaldo Mckee. Admitted 11/2-11/8/22 acute on chronic hypoxic respiratory failure. One episodes of rapid atrial fibrillation, symptomatic, started on cardizem and converted. [facility] 11/4/22 revealed she was in acute on chronic diastolic heart failure. NYHA class II, stage C. Her oxygen demand improved  and oxygen was weaned to 2-2.5L. Her uncontrolled blood pressure, and recurrent atrial fibrillation playing a role in her CHF recurrences. 3/26/2023 reporting worsening SOB and BLE edema. CXR showed no  vidence of pulmonary congestion. CXR mild vascular congestion. Pulmonary and Cardiology consulted. Per Pulmonology chronic hypercapnic respiratory failure, COPD, suspected severe sleep apnea with discussions of BiPAP. Patient reported prior sleep study years ago with intolerance to CPAP therapy. She is not interested in BiPAP at this time, weaned to 2L (home 4L. )  Discussions that her dyspnea multifactorial including COPD, sleep apnea, obesity, CHF, deconditioning.  She was admitted proximal circumflex artery with some mild disease of the obtuse marginal branch, not hemodynamically significant. 7.  Left-to-right collaterals. 8.  100% occlusion of mid RCA. 9.  Normal left ventricular systolic function with preserved EF of 65% to 70%. Cath: 9/20/22 [place]     Findings:  Left Main  Distal non obstructive 30% disease, evaluated by IVUS MLA > 7.5 mm2   LAD  Moderate diffuse disease   Circ  Proximal vessel 75-80% stenosis correlating with positive functional study   Mid vessel non-obstructive disease 50%   RCA   with L-R collaterals         Interventions/Vessels  PCI performed to the proximal Lcx with DESx2   Resolute Mckay 3.5x22 mm   Resolute Mckay 3. 5x8 mm   Excellent post angiographic result with BALWINDER 3 flow   Guides/Wires  XB 3.5 guide catheter   [employer]   Balanced Heavy Weight   Devices  Opticross boston IVUS   Post % Stenosis  0   Closure Device  Perclose R CFA   Complications  None      Conclusion:   Successful PCI of proximal Lcx with DESx2  Plavix loaded in cath lab  Femoral artery perclosed with excellent hemostasis  3H bedrest  Home this evening, follow up in 1-2 weeks     Echo: 11/3/22   Borderline conc. LVH with normal wall motion; EF 70%. Grade II diastolic dysfunction with elevated LH filling pressures. The left atrium is severely dilated. The right ventricle is normal in size and function. Mild mitral, aortic and tricuspid regurgitation. [facility] 11/4/22  Findings:  RA  5   RV  53/9   PA  49/20 (28)   PCWP  25   FPC  3.8    Conclusion: Post capillary pulmonary hypertension, Recommend continued diuresis     CXR: 3/26/23      FINDINGS:   Mediastinum: Thyroid is prominent. Small nodule on the left which measures   approximately 9.5 mm x 8.2 mm. Atherosclerotic changes of the aorta and   great vessels. No aneurysm the the descending thoracic aorta is upper limits   of normal 3.4 cm. Heart size is enlarged. Dense coronary artery   calcifications.   Multiple

## 2023-09-02 NOTE — ED NOTES
Report called @ [id number] to [facility] at [facility] on [address] for room 8498. RN states understanding and had no further questions.       Samson Alfonso RN  09/01/23 2023

## 2023-09-02 NOTE — PROGRESS NOTES
Dr. Demarco Bailey at bedside. RN received new orders for pulmonology consult for COPD, BiPap, ABG check one hour after initiating BiPap, and Duonebs Q4H. RN called RT, Rafa De Souza, to notify of new order for BiPap, ABG, and Duonebs. Rafa De Souza advised she will enter order for BiPap. Patient and family aware of order for BiPap at this time.

## 2023-09-02 NOTE — ED NOTES
[facility] At bedside. Report given to Maria Antonia Friedman Paramedic who states understanding and had no further questions. Face sheet, ED synopsis, Copy of EKG, Ambulance Certification provided to Medic. Crew assumes Pt care at this time.       Dyana Triana RN  09/01/23 1714

## 2023-09-02 NOTE — PROGRESS NOTES
Patient pulling BiPap mask off when lab attempting to obtain VBG. Patient refusing to place BiPap mask back on despite multiple attempts and repeated education. RN messaged Dr. Santiago Pal via [employer]. Awaiting response. RN notified patient's , Neyda Medrano, per patient request. [place] Organ advised he will come back to speak with patient. Patient placed on 3 liters nasal canula. RT notified. Bed alarm on and call light within reach.

## 2023-09-02 NOTE — PLAN OF CARE
Problem: Discharge Planning  Goal: Discharge to home or other facility with appropriate resources  Outcome: Progressing     Problem: Safety - Adult  Goal: Free from fall injury  Outcome: Progressing     Problem: ABCDS Injury Assessment  Goal: Absence of physical injury  Outcome: Progressing     Problem: Skin/Tissue Integrity  Goal: Absence of new skin breakdown  Description: 1. Monitor for areas of redness and/or skin breakdown  2. Assess vascular access sites hourly  3. Every 4-6 hours minimum:  Change oxygen saturation probe site  4. Every 4-6 hours:  If on nasal continuous positive airway pressure, respiratory therapy assess nares and determine need for appliance change or resting period.   Outcome: Progressing     Problem: Respiratory - Adult  Goal: Achieves optimal ventilation and oxygenation  Outcome: Progressing     Problem: Cardiovascular - Adult  Goal: Maintains optimal cardiac output and hemodynamic stability  Outcome: Progressing  Goal: Absence of cardiac dysrhythmias or at baseline  Outcome: Progressing     Problem: Skin/Tissue Integrity - Adult  Goal: Skin integrity remains intact  Outcome: Progressing  Goal: Oral mucous membranes remain intact  Outcome: Progressing     Problem: Musculoskeletal - Adult  Goal: Return mobility to safest level of function  Outcome: Progressing  Goal: Return ADL status to a safe level of function  Outcome: Progressing     Problem: Gastrointestinal - Adult  Goal: Maintains or returns to baseline bowel function  Outcome: Progressing  Goal: Maintains adequate nutritional intake  Outcome: Progressing     Problem: Genitourinary - Adult  Goal: Absence of urinary retention  Outcome: Progressing     Problem: Infection - Adult  Goal: Absence of infection at discharge  Outcome: Progressing     Problem: Metabolic/Fluid and Electrolytes - Adult  Goal: Electrolytes maintained within normal limits  Outcome: Progressing  Goal: Hemodynamic stability and optimal renal function

## 2023-09-02 NOTE — PROGRESS NOTES
Dr. Dolly Javier returned this RN's phone call. Reviewed ABG results with Dr. Dolly Javier and received order to decrease patient's O2 to 3 liters nasal canula. O2 decreased to 3 liters nasal canula at this time.

## 2023-09-02 NOTE — ED NOTES
Pt rounded on, found to be in bed high fowlers, A&0x4, 4L NC at baseline per handoff report. Pt states at home she uses a walker. At this time she denies any needs or pain. Purwick in place. Pt in a hospital gown, non skid socks with ID, Allergy and Fall risk bracelets on her wrist. The nights plan of care, goals, fall prevention and safety have been reviewed with the pt who acknowledges understanding. Bed locked. Lowered. Rails x2. Call light in reach. Bedside table next to bed. Opportunity for questions, concerns, medication review offered. No further questions or needs made known at this time.        Denise Dodge RN  09/01/23 1611

## 2023-09-02 NOTE — ED NOTES
Pt Urine output was 600 ml via Purwick per wall suction canister. Urine was dark yellow, cloudy strong malodorous.       Bren Samuel RN  09/01/23 7500

## 2023-09-02 NOTE — PLAN OF CARE
Problem: Discharge Planning  Goal: Discharge to home or other facility with appropriate resources  9/2/2023 1115 by Radha Santamaria RN  Outcome: Progressing     Problem: Safety - Adult  Goal: Free from fall injury  9/2/2023 1115 by Radha Santamaria RN  Outcome: Progressing     Problem: ABCDS Injury Assessment  Goal: Absence of physical injury  9/2/2023 1115 by Radha Santamaria RN  Outcome: Progressing     Problem: Skin/Tissue Integrity  Goal: Absence of new skin breakdown  Description: 1. Monitor for areas of redness and/or skin breakdown  2. Assess vascular access sites hourly  3. Every 4-6 hours minimum:  Change oxygen saturation probe site  4. Every 4-6 hours:  If on nasal continuous positive airway pressure, respiratory therapy assess nares and determine need for appliance change or resting period.   9/2/2023 1115 by Radha Santamaria RN  Outcome: Progressing     Problem: Respiratory - Adult  Goal: Achieves optimal ventilation and oxygenation  9/2/2023 1115 by Radha Santamaria RN  Outcome: Progressing     Problem: Cardiovascular - Adult  Goal: Maintains optimal cardiac output and hemodynamic stability  9/2/2023 1115 by Radha Santamaria RN  Outcome: Progressing     Problem: Cardiovascular - Adult  Goal: Absence of cardiac dysrhythmias or at baseline  9/2/2023 1115 by Radha Santamaria RN  Outcome: Progressing     Problem: Skin/Tissue Integrity - Adult  Goal: Skin integrity remains intact  9/2/2023 1115 by Radha Santamaria RN  Outcome: Progressing  Flowsheets (Taken 9/2/2023 1115)  Skin Integrity Remains Intact: Monitor for areas of redness and/or skin breakdown     Problem: Skin/Tissue Integrity - Adult  Goal: Oral mucous membranes remain intact  9/2/2023 1115 by Radha Santamaria RN  Outcome: Progressing     Problem: Musculoskeletal - Adult  Goal: Return mobility to safest level of function  9/2/2023 1115 by Radha Santamaria RN  Outcome: Progressing     Problem: Musculoskeletal - Adult  Goal: Return ADL status to a safe level of function  9/2/2023 1115 by Agapito Burger RN  Outcome: Progressing     Problem: Gastrointestinal - Adult  Goal: Maintains or returns to baseline bowel function  9/2/2023 1115 by Agapito Burger RN  Outcome: Progressing     Problem: Gastrointestinal - Adult  Goal: Maintains adequate nutritional intake  9/2/2023 1115 by Agapito Burger RN  Outcome: Progressing     Problem: Genitourinary - Adult  Goal: Absence of urinary retention  9/2/2023 1115 by Agapito Burger RN  Outcome: Progressing     Problem: Infection - Adult  Goal: Absence of infection at discharge  9/2/2023 1115 by Agapito Burger RN  Outcome: Progressing     Problem: Metabolic/Fluid and Electrolytes - Adult  Goal: Electrolytes maintained within normal limits  9/2/2023 1115 by Agapito Burger RN  Outcome: Progressing     Problem: Metabolic/Fluid and Electrolytes - Adult  Goal: Hemodynamic stability and optimal renal function maintained  9/2/2023 1115 by Agapito Burger RN  Outcome: Progressing     Problem: Metabolic/Fluid and Electrolytes - Adult  Goal: Glucose maintained within prescribed range  9/2/2023 1115 by Agapito Burger RN  Outcome: Progressing     Problem: Pain  Goal: Verbalizes/displays adequate comfort level or baseline comfort level  Outcome: Progressing

## 2023-09-02 NOTE — PROGRESS NOTES
4 Eyes Skin Assessment     NAME:  Marti Fry  YOB: 1945  MEDICAL RECORD NUMBER:  [id number]    The patient is being assessed for  Admission    I agree that at least one RN has performed a thorough Head to Toe Skin Assessment on the patient. ALL assessment sites listed below have been assessed. Areas assessed by both nurses:    Head, Face, Ears, Shoulders, Back, Chest, Arms, Elbows, Hands, Sacrum. Buttock, Coccyx, Ischium, Legs. Feet and Heels, and Under Medical Devices         Does the Patient have a Wound? Yes wound(s) were present on assessment.  LDA wound assessment was Initiated and completed by RN       Brayodn Prevention initiated by RN: Yes  Wound Care Orders initiated by RN: Yes    Pressure Injury (Stage 3,4, Unstageable, DTI, NWPT, and Complex wounds) if present, place Wound referral order by RN under : No    New Ostomies, if present place, Ostomy referral order under : No     Nurse 1 eSignature: Electronically signed by Micaela Eisenberg RN on 9/2/23 at 2:19 AM EDT    **SHARE this note so that the co-signing nurse can place an eSignature**    Nurse 2 eSignature: {Esignature:[id number]}

## 2023-09-02 NOTE — PROGRESS NOTES
RN received verbal order from Dr. Jesus Vega  to D/C order for ABG one hour after initiating BiPap and change order to VBG one hour after initiating BiPap. See orders.

## 2023-09-02 NOTE — ED NOTES
Pt's  left the bedside. Pt's  provided with pts room number at [facility] and updated awaiting Transportation, ETA given from [facility] was [phone]. Pt states no needs at this time. Safety precautions remain in place, Bed locked. Lowered. Rails x2. Call light in reach. Bedside table next to bed.       Donell Perez RN  09/01/23 9893

## 2023-09-02 NOTE — ED NOTES
Pts  returned to Bedside. Pt states she has no needs at this time but asked for a cup of \" hot coffee with creamer\" for her . RN provided Pt's  with a cup of coffee and creamer packets.  thankful. All known needs met. Bed remains locked. Lowered. Rails x2. Call light in reach. Bedside table next to bed.       Daisy Bowles RN  09/01/23 6254

## 2023-09-02 NOTE — PROGRESS NOTES
Hospitalist Progress Note  9/2/2023 4:03 PM    PCP: Gaudencio Cade MD    [phone]     Date of Admission: 9/1/2023                                                                                                                     HOSPITAL COURSE    Patient demographics:  The patient  Moshe Funes is a 69 y.o. female     Significant past medical history:   Patient Active Problem List   Diagnosis    Essential hypertension    Hyperlipidemia    Coronary atherosclerosis due to calcified coronary lesion of native artery    DENISE (obstructive sleep apnea)    History of DVT (deep vein thrombosis)    Family history of DVT    AAA (abdominal aortic aneurysm) without rupture ([facility])    Pleural effusion, left    Pleural effusion    COPD exacerbation ([facility])    Pelvic pain in female    Vitamin D deficiency    Other emphysema ([facility])    Acute bronchitis    Acute respiratory failure with hypoxia ([facility])    Abnormal CXR    Atypical pneumonia    Atrial fibrillation with RVR ([facility])    Chronic diastolic (congestive) heart failure ([facility])    PVD (peripheral vascular disease) ([facility])    Abnormal nuclear stress test    AAA (abdominal aortic aneurysm) ([facility])    PAF (paroxysmal atrial fibrillation) ([facility])    Endoleak post (EVAR) endovascular aneurysm repair, sequela    Carotid artery stenosis without cerebral infarction, bilateral    History of repair of aneurysm of abdominal aorta using endovascular stent graft    Atherosclerotic heart disease of native coronary artery with other forms of angina pectoris ([address])    Morbid obesity due to excess calories ([facility])    COPD, severe ([address])    Hypertensive crisis    Acute on chronic diastolic congestive heart failure (HCC)    Respiratory failure ([address])    Left atrial flutter by electrocardiogram ([address])    Persistent atrial fibrillation (HCC)    A-fib ([facility])    Obesity (BMI 30-39. 9)    Atrial fibrillation ([facility])    LI (dyspnea on exertion)    Chronic systolic (congestive) heart failure    Pneumonia    Acute on chronic

## 2023-09-02 NOTE — ED NOTES
This RN called [employer] at [phone] and spoke with Angelica Morgan who states that the crew is on their way. Dispatch apologized for the delay.      Swati Torres RN  09/01/23 6827

## 2023-09-03 LAB
ANION GAP SERPL CALCULATED.3IONS-SCNC: 8 MMOL/L (ref 3–16)
BASE EXCESS BLDV CALC-SCNC: 11.4 MMOL/L
BASE EXCESS BLDV CALC-SCNC: 11.8 MMOL/L
BASE EXCESS BLDV CALC-SCNC: 12.2 MMOL/L
BUN SERPL-MCNC: 31 MG/DL (ref 7–20)
CALCIUM SERPL-MCNC: 8.8 MG/DL (ref 8.3–10.6)
CHLORIDE SERPL-SCNC: 92 MMOL/L (ref 99–110)
CO2 BLDV-SCNC: 40 MMOL/L
CO2 BLDV-SCNC: 40 MMOL/L
CO2 BLDV-SCNC: 41 MMOL/L
CO2 SERPL-SCNC: 38 MMOL/L (ref 21–32)
COHGB MFR BLDV: 1.8 %
COHGB MFR BLDV: 1.9 %
COHGB MFR BLDV: 2 %
CREAT SERPL-MCNC: 1.8 MG/DL (ref 0.6–1.2)
DEPRECATED RDW RBC AUTO: 17.2 % (ref 12.4–15.4)
GFR SERPLBLD CREATININE-BSD FMLA CKD-EPI: 29 ML/MIN/{1.73_M2}
GLUCOSE SERPL-MCNC: 129 MG/DL (ref 70–99)
HCO3 BLDV-SCNC: 38 MMOL/L (ref 23–29)
HCO3 BLDV-SCNC: 38 MMOL/L (ref 23–29)
HCO3 BLDV-SCNC: 39 MMOL/L (ref 23–29)
HCT VFR BLD AUTO: 27.1 % (ref 36–48)
HGB BLD-MCNC: 9.1 G/DL (ref 12–16)
MAGNESIUM SERPL-MCNC: 2.1 MG/DL (ref 1.8–2.4)
MCH RBC QN AUTO: 29.2 PG (ref 26–34)
MCHC RBC AUTO-ENTMCNC: 33.5 G/DL (ref 31–36)
MCV RBC AUTO: 87 FL (ref 80–100)
METHGB MFR BLDV: 0.1 %
METHGB MFR BLDV: 0.5 %
METHGB MFR BLDV: 0.7 %
O2 THERAPY: ABNORMAL
PCO2 BLDV: 58 MMHG (ref 40–50)
PCO2 BLDV: 60.7 MMHG (ref 40–50)
PCO2 BLDV: 60.9 MMHG (ref 40–50)
PH BLDV: 7.41 [PH] (ref 7.35–7.45)
PH BLDV: 7.41 [PH] (ref 7.35–7.45)
PH BLDV: 7.42 [PH] (ref 7.35–7.45)
PLATELET # BLD AUTO: 224 K/UL (ref 135–450)
PMV BLD AUTO: 9.7 FL (ref 5–10.5)
PO2 BLDV: 33 MMHG
PO2 BLDV: 70 MMHG
PO2 BLDV: 74 MMHG
POTASSIUM SERPL-SCNC: 3.3 MMOL/L (ref 3.5–5.1)
RBC # BLD AUTO: 3.11 M/UL (ref 4–5.2)
SAO2 % BLDV: 57 %
SAO2 % BLDV: 94 %
SAO2 % BLDV: 96 %
SODIUM SERPL-SCNC: 138 MMOL/L (ref 136–145)
TROPONIN, HIGH SENSITIVITY: 100 NG/L (ref 0–14)
TROPONIN, HIGH SENSITIVITY: 91 NG/L (ref 0–14)
TROPONIN, HIGH SENSITIVITY: 93 NG/L (ref 0–14)
TROPONIN, HIGH SENSITIVITY: 98 NG/L (ref 0–14)
WBC # BLD AUTO: 7.5 K/UL (ref 4–11)

## 2023-09-03 PROCEDURE — 36415 COLL VENOUS BLD VENIPUNCTURE: CPT

## 2023-09-03 PROCEDURE — 6360000002 HC RX W HCPCS: Performed by: HOSPITALIST

## 2023-09-03 PROCEDURE — 6370000000 HC RX 637 (ALT 250 FOR IP): Performed by: HOSPITALIST

## 2023-09-03 PROCEDURE — 82803 BLOOD GASES ANY COMBINATION: CPT

## 2023-09-03 PROCEDURE — 83735 ASSAY OF MAGNESIUM: CPT

## 2023-09-03 PROCEDURE — 6370000000 HC RX 637 (ALT 250 FOR IP): Performed by: INTERNAL MEDICINE

## 2023-09-03 PROCEDURE — 94640 AIRWAY INHALATION TREATMENT: CPT

## 2023-09-03 PROCEDURE — 85027 COMPLETE CBC AUTOMATED: CPT

## 2023-09-03 PROCEDURE — 99223 1ST HOSP IP/OBS HIGH 75: CPT | Performed by: INTERNAL MEDICINE

## 2023-09-03 PROCEDURE — 94660 CPAP INITIATION&MGMT: CPT

## 2023-09-03 PROCEDURE — 2580000003 HC RX 258: Performed by: HOSPITALIST

## 2023-09-03 PROCEDURE — 2060000000 HC ICU INTERMEDIATE R&B

## 2023-09-03 PROCEDURE — 84484 ASSAY OF TROPONIN QUANT: CPT

## 2023-09-03 PROCEDURE — 6360000002 HC RX W HCPCS: Performed by: INTERNAL MEDICINE

## 2023-09-03 PROCEDURE — 80048 BASIC METABOLIC PNL TOTAL CA: CPT

## 2023-09-03 PROCEDURE — 94761 N-INVAS EAR/PLS OXIMETRY MLT: CPT

## 2023-09-03 PROCEDURE — 2700000000 HC OXYGEN THERAPY PER DAY

## 2023-09-03 PROCEDURE — 99233 SBSQ HOSP IP/OBS HIGH 50: CPT | Performed by: INTERNAL MEDICINE

## 2023-09-03 RX ORDER — METOLAZONE 5 MG/1
2.5 TABLET ORAL ONCE
Status: COMPLETED | OUTPATIENT
Start: 2023-09-03 | End: 2023-09-03

## 2023-09-03 RX ORDER — FUROSEMIDE 10 MG/ML
60 INJECTION INTRAMUSCULAR; INTRAVENOUS 2 TIMES DAILY
Status: DISCONTINUED | OUTPATIENT
Start: 2023-09-03 | End: 2023-09-07

## 2023-09-03 RX ORDER — DOFETILIDE 0.25 MG/1
250 CAPSULE ORAL EVERY 12 HOURS SCHEDULED
Status: DISCONTINUED | OUTPATIENT
Start: 2023-09-03 | End: 2023-09-06

## 2023-09-03 RX ORDER — HYDRALAZINE HYDROCHLORIDE 50 MG/1
50 TABLET, FILM COATED ORAL EVERY 8 HOURS SCHEDULED
Status: DISCONTINUED | OUTPATIENT
Start: 2023-09-03 | End: 2023-09-13

## 2023-09-03 RX ADMIN — IPRATROPIUM BROMIDE AND ALBUTEROL SULFATE 1 DOSE: 2.5; .5 SOLUTION RESPIRATORY (INHALATION) at 04:34

## 2023-09-03 RX ADMIN — IPRATROPIUM BROMIDE AND ALBUTEROL SULFATE 1 DOSE: 2.5; .5 SOLUTION RESPIRATORY (INHALATION) at 11:34

## 2023-09-03 RX ADMIN — TIOTROPIUM BROMIDE INHALATION SPRAY 2 PUFF: 3.12 SPRAY, METERED RESPIRATORY (INHALATION) at 09:08

## 2023-09-03 RX ADMIN — ACETAZOLAMIDE 125 MG: 250 TABLET ORAL at 08:59

## 2023-09-03 RX ADMIN — HYDRALAZINE HYDROCHLORIDE 50 MG: 50 TABLET, FILM COATED ORAL at 14:29

## 2023-09-03 RX ADMIN — CARVEDILOL 6.25 MG: 6.25 TABLET, FILM COATED ORAL at 17:10

## 2023-09-03 RX ADMIN — ROSUVASTATIN CALCIUM 20 MG: 20 TABLET, FILM COATED ORAL at 08:59

## 2023-09-03 RX ADMIN — DILTIAZEM HYDROCHLORIDE 120 MG: 120 CAPSULE, COATED, EXTENDED RELEASE ORAL at 08:58

## 2023-09-03 RX ADMIN — FUROSEMIDE 40 MG: 10 INJECTION, SOLUTION INTRAMUSCULAR; INTRAVENOUS at 09:01

## 2023-09-03 RX ADMIN — DOFETILIDE 250 MCG: 0.25 CAPSULE ORAL at 20:56

## 2023-09-03 RX ADMIN — MOMETASONE FUROATE AND FORMOTEROL FUMARATE DIHYDRATE 2 PUFF: 200; 5 AEROSOL RESPIRATORY (INHALATION) at 09:07

## 2023-09-03 RX ADMIN — RANOLAZINE 500 MG: 500 TABLET, FILM COATED, EXTENDED RELEASE ORAL at 20:56

## 2023-09-03 RX ADMIN — MOMETASONE FUROATE AND FORMOTEROL FUMARATE DIHYDRATE 2 PUFF: 200; 5 AEROSOL RESPIRATORY (INHALATION) at 19:41

## 2023-09-03 RX ADMIN — EMPAGLIFLOZIN 10 MG: 10 TABLET, FILM COATED ORAL at 08:59

## 2023-09-03 RX ADMIN — Medication 10 ML: at 18:07

## 2023-09-03 RX ADMIN — METOLAZONE 2.5 MG: 5 TABLET ORAL at 17:10

## 2023-09-03 RX ADMIN — IPRATROPIUM BROMIDE AND ALBUTEROL SULFATE 1 DOSE: 2.5; .5 SOLUTION RESPIRATORY (INHALATION) at 15:56

## 2023-09-03 RX ADMIN — ISOSORBIDE MONONITRATE 120 MG: 60 TABLET, EXTENDED RELEASE ORAL at 09:00

## 2023-09-03 RX ADMIN — FERROUS SULFATE TAB EC 324 MG (65 MG FE EQUIVALENT) 324 MG: 324 (65 FE) TABLET DELAYED RESPONSE at 17:10

## 2023-09-03 RX ADMIN — SODIUM CHLORIDE, PRESERVATIVE FREE 10 ML: 5 INJECTION INTRAVENOUS at 21:00

## 2023-09-03 RX ADMIN — FERROUS SULFATE TAB EC 324 MG (65 MG FE EQUIVALENT) 324 MG: 324 (65 FE) TABLET DELAYED RESPONSE at 09:00

## 2023-09-03 RX ADMIN — RANOLAZINE 500 MG: 500 TABLET, FILM COATED, EXTENDED RELEASE ORAL at 08:59

## 2023-09-03 RX ADMIN — ACETAZOLAMIDE 125 MG: 250 TABLET ORAL at 20:56

## 2023-09-03 RX ADMIN — POTASSIUM CHLORIDE 40 MEQ: 1500 TABLET, EXTENDED RELEASE ORAL at 09:04

## 2023-09-03 RX ADMIN — HYDRALAZINE HYDROCHLORIDE 50 MG: 50 TABLET, FILM COATED ORAL at 20:56

## 2023-09-03 RX ADMIN — CARVEDILOL 6.25 MG: 6.25 TABLET, FILM COATED ORAL at 09:00

## 2023-09-03 RX ADMIN — IPRATROPIUM BROMIDE AND ALBUTEROL SULFATE 1 DOSE: 2.5; .5 SOLUTION RESPIRATORY (INHALATION) at 08:20

## 2023-09-03 RX ADMIN — RIVAROXABAN 15 MG: 15 TABLET, FILM COATED ORAL at 08:59

## 2023-09-03 RX ADMIN — IPRATROPIUM BROMIDE AND ALBUTEROL SULFATE 1 DOSE: 2.5; .5 SOLUTION RESPIRATORY (INHALATION) at 19:40

## 2023-09-03 RX ADMIN — DOFETILIDE 250 MCG: 0.25 CAPSULE ORAL at 12:08

## 2023-09-03 RX ADMIN — METHYLPREDNISOLONE SODIUM SUCCINATE 40 MG: 40 INJECTION, POWDER, FOR SOLUTION INTRAMUSCULAR; INTRAVENOUS at 00:47

## 2023-09-03 RX ADMIN — METHYLPREDNISOLONE SODIUM SUCCINATE 40 MG: 40 INJECTION, POWDER, FOR SOLUTION INTRAMUSCULAR; INTRAVENOUS at 09:01

## 2023-09-03 RX ADMIN — FUROSEMIDE 60 MG: 10 INJECTION, SOLUTION INTRAMUSCULAR; INTRAVENOUS at 18:07

## 2023-09-03 RX ADMIN — OXYCODONE HYDROCHLORIDE AND ACETAMINOPHEN 500 MG: 500 TABLET ORAL at 09:00

## 2023-09-03 RX ADMIN — SODIUM CHLORIDE, PRESERVATIVE FREE 10 ML: 5 INJECTION INTRAVENOUS at 09:01

## 2023-09-03 ASSESSMENT — PAIN SCALES - WONG BAKER
WONGBAKER_NUMERICALRESPONSE: 0

## 2023-09-03 ASSESSMENT — PAIN SCALES - GENERAL
PAINLEVEL_OUTOF10: 0

## 2023-09-03 NOTE — PROGRESS NOTES
[address] [employer]   Daily Progress Note      Admit Date:  9/1/2023    CC: \"  The patient is 69 y.o. female with a past medical history significant for CAD, heart failure, atrial fib, HTN, aortic aneurysm and COPD. Abnormal stress followed by [facility] 3/2/18 which revealed  of the RCA and nonobstructive CAD of the LAD and LCX. Endovascular AAA repair on 3/21/18 by Dr. Shahab Birch. Admitted 2/25/20 for atrial fibrillation along with signs and symptoms of heart failure. She converted to sinus rhythm after she was started on amiodarone therapy. S/p PVI ablation 10/2020, recurrence s/p DCCV 1/12/21, repeat AFIB ablation 2/17/21. Admitted 5/24-5/26/22 atrial fibrillation for dofetilide loading after having multiple ablations, cardioversions (last ablation 2/4/22 ,CV 4/2022) and failing flecainide therapy. She was started on 250mcg of dofetilide Q12H. he did convert to sinus rhythm on 5/25/22 with improvement in symptoms. She was discharged on 250mcg of dofetilide. Abnormal Lexiscan followed by [facility] on 9/20/22 per Dr. Logan Gomez. Admitted 11/2-11/8/22 acute on chronic hypoxic respiratory failure. One episodes of rapid atrial fibrillation, symptomatic, started on cardizem and converted. [facility] 11/4/22 revealed she was in acute on chronic diastolic heart failure. NYHA class II, stage C. Her oxygen demand improved  and oxygen was weaned to 2-2.5L. Her uncontrolled blood pressure, and recurrent atrial fibrillation playing a role in her CHF recurrences. 3/26/2023 reporting worsening SOB and BLE edema. CXR showed no  vidence of pulmonary congestion. CXR mild vascular congestion. Pulmonary and Cardiology consulted. Per Pulmonology chronic hypercapnic respiratory failure, COPD, suspected severe sleep apnea with discussions of BiPAP. Patient reported prior sleep study years ago with intolerance to CPAP therapy. She is not interested in BiPAP at this time, weaned to 2L (home 4L. )  Discussions that her dyspnea multifactorial

## 2023-09-03 NOTE — PROGRESS NOTES
Hospitalist Progress Note  9/3/2023 10:33 AM    PCP: Kate Mccormack MD    [phone]     Date of Admission: 9/1/2023                                                                                                                     HOSPITAL COURSE    Patient demographics:  The patient  Garland Escobar is a 69 y.o. female     Significant past medical history:   Patient Active Problem List   Diagnosis    Essential hypertension    Hyperlipidemia    Coronary atherosclerosis due to calcified coronary lesion of native artery    DENISE (obstructive sleep apnea)    History of DVT (deep vein thrombosis)    Family history of DVT    AAA (abdominal aortic aneurysm) without rupture ([facility])    Pleural effusion, left    Pleural effusion    COPD exacerbation ([facility])    Pelvic pain in female    Vitamin D deficiency    Other emphysema ([facility])    Acute bronchitis    Acute respiratory failure with hypoxia ([facility])    Abnormal CXR    Atypical pneumonia    Atrial fibrillation with RVR ([facility])    Chronic diastolic (congestive) heart failure ([facility])    PVD (peripheral vascular disease) ([facility])    Abnormal nuclear stress test    AAA (abdominal aortic aneurysm) ([facility])    PAF (paroxysmal atrial fibrillation) ([facility])    Endoleak post (EVAR) endovascular aneurysm repair, sequela    Carotid artery stenosis without cerebral infarction, bilateral    History of repair of aneurysm of abdominal aorta using endovascular stent graft    Atherosclerotic heart disease of native coronary artery with other forms of angina pectoris ([address])    Morbid obesity due to excess calories ([facility])    COPD, severe ([address])    Hypertensive crisis    Acute on chronic diastolic congestive heart failure (HCC)    Respiratory failure ([address])    Left atrial flutter by electrocardiogram ([address])    Persistent atrial fibrillation ([facility])    A-fib ([facility])    Obesity (BMI 30-39. 9)    Atrial fibrillation ([facility])    LI (dyspnea on exertion)    Chronic systolic (congestive) heart failure    Pneumonia    Acute on

## 2023-09-03 NOTE — PLAN OF CARE
Problem: Discharge Planning  Goal: Discharge to home or other facility with appropriate resources  9/3/2023 1035 by Gerardo Stratton RN  Outcome: Progressing     Problem: Safety - Adult  Goal: Free from fall injury  9/3/2023 1035 by Gerardo Stratton RN  Outcome: Progressing     Problem: ABCDS Injury Assessment  Goal: Absence of physical injury  9/3/2023 1035 by Gerardo Stratton RN  Outcome: Progressing     Problem: Skin/Tissue Integrity  Goal: Absence of new skin breakdown  Description: 1. Monitor for areas of redness and/or skin breakdown  2. Assess vascular access sites hourly  3. Every 4-6 hours minimum:  Change oxygen saturation probe site  4. Every 4-6 hours:  If on nasal continuous positive airway pressure, respiratory therapy assess nares and determine need for appliance change or resting period.   9/3/2023 1035 by Gerardo Stratton RN  Outcome: Progressing     Problem: Respiratory - Adult  Goal: Achieves optimal ventilation and oxygenation  9/3/2023 1035 by Gerardo Stratton RN  Outcome: Progressing     Problem: Cardiovascular - Adult  Goal: Maintains optimal cardiac output and hemodynamic stability  9/3/2023 1035 by Gerardo Stratton RN  Outcome: Progressing     Problem: Cardiovascular - Adult  Goal: Absence of cardiac dysrhythmias or at baseline  9/3/2023 1035 by Gerardo Stratton RN  Outcome: Progressing     Problem: Skin/Tissue Integrity - Adult  Goal: Skin integrity remains intact  9/3/2023 1035 by Gerardo Stratton RN  Outcome: Progressing  Flowsheets (Taken 9/3/2023 1035)  Skin Integrity Remains Intact: Monitor for areas of redness and/or skin breakdown     Problem: Skin/Tissue Integrity - Adult  Goal: Oral mucous membranes remain intact  9/3/2023 1035 by Gerardo Stratton RN  Outcome: Progressing  Flowsheets (Taken 9/3/2023 1035)  Oral Mucous Membranes Remain Intact: Assess oral mucosa and hygiene practices     Problem: Musculoskeletal - Adult  Goal: Return mobility to safest level of function  9/3/2023 1035 by Tamiko Prather

## 2023-09-03 NOTE — PROGRESS NOTES
Case management at bedside discussing patient code status with patient and . Patient stating he would want CPR and intubation for a short time. RN messaged Dr. Tavo Oquendo per case management request and received order to change code status to full code. Dr. Simi Miranda notified during rounds and he discussed code status with patient and  who then advised they do not want CPR/intubation. Code status changed back to [facility] at this time per Dr. Simi Miranda verbal order.

## 2023-09-03 NOTE — CARE COORDINATION
Case Management Assessment  Initial Evaluation    Date/Time of Evaluation: 9/3/2023 12:38 PM  Assessment Completed by: Luna Zapata RN    If patient is discharged prior to next notation, then this note serves as note for discharge by case management. Patient Name: Tamir Flynn                   YOB: 1945  Diagnosis: SOB (shortness of breath) [R06.02]  Non-STEMI (non-ST elevated myocardial infarction) ([address]) [I21.4]  Acute on chronic congestive heart failure, unspecified heart failure type ([address]) [I50.9]  Chronic renal failure, stage 3b ([address]) [N18.32]  Acute congestive heart failure, unspecified heart failure type ([address]) [I50.9]                   Date / Time: 9/1/2023  3:59 PM    Patient Admission Status: Inpatient   Readmission Risk (Low < 19, Mod (19-27), High > 27): Readmission Risk Score: 35.5    Current PCP: Willie Mejia MD  PCP verified by CM? Yes    Chart Reviewed: Yes      History Provided by: Patient, Spouse  Patient Orientation: Alert and Oriented    Patient Cognition: Alert    Hospitalization in the last 30 days (Readmission):  Yes    If yes, Readmission Assessment in  Navigator will be completed. Advance Directives:      Code Status: DNR-CCA   Patient's Primary Decision Maker is: Legal Next of Kin    Primary Decision Maker: Anna Guzman Spouse - [phone]    Secondary Decision Maker:  Tae Jernigan Child - [phone]    Discharge Planning:    Patient lives with: Spouse/Significant Other Type of Home: Trailer/Mobile Home  Primary Care Giver: Self  Patient Support Systems include: Spouse/Significant Other, Children   Current Financial resources: Medicare  Current community resources: ECF/Home Care  Current services prior to admission: Durable Medical Equipment, Oxygen Therapy, [facility] Care ([facility]            Current DME: Reddeyvi Campos, Wheelchair, Home Aerosol, Oxygen Therapy (Comment) (4L O2 baseline throught [employer])            Type of Home Care & inform her of need for BIPAP. The Plan for Transition of Care is related to the following treatment goals of SOB (shortness of breath) [R06.02]  Non-STEMI (non-ST elevated myocardial infarction) ([address]) [I21.4]  Acute on chronic congestive heart failure, unspecified heart failure type ([address]) [I50.9]  Chronic renal failure, stage 3b (HCC) [N18.32]  Acute congestive heart failure, unspecified heart failure type ([address]) [D84.9]    IF APPLICABLE: The Patient and/or patient representative Nils Harper and her family were provided with a choice of provider and agrees with the discharge plan. Freedom of choice list with basic dialogue that supports the patient's individualized plan of care/goals and shares the quality data associated with the providers was provided to: Patient Representative   Patient Representative Name: spouse Jackie     The Patient and/or Patient Representative Agree with the Discharge Plan?  Yes (plans to resume [facility])    Luiz Baugh RN  Case Management Department  Ph: [phone]

## 2023-09-03 NOTE — PROGRESS NOTES
Hospitalist Progress Note  9/3/2023 10:37 AM    PCP: Christine Kocher, MD    [phone]     Date of Admission: 9/1/2023                                                                                                                     HOSPITAL COURSE    Patient demographics:  The patient  Dyana Daley is a 69 y.o. female     Significant past medical history:   Patient Active Problem List   Diagnosis    Essential hypertension    Hyperlipidemia    Coronary atherosclerosis due to calcified coronary lesion of native artery    DENISE (obstructive sleep apnea)    History of DVT (deep vein thrombosis)    Family history of DVT    AAA (abdominal aortic aneurysm) without rupture ([facility])    Pleural effusion, left    Pleural effusion    COPD exacerbation ([facility])    Pelvic pain in female    Vitamin D deficiency    Other emphysema ([facility])    Acute bronchitis    Acute respiratory failure with hypoxia ([facility])    Abnormal CXR    Atypical pneumonia    Atrial fibrillation with RVR ([facility])    Chronic diastolic (congestive) heart failure ([facility])    PVD (peripheral vascular disease) ([facility])    Abnormal nuclear stress test    AAA (abdominal aortic aneurysm) ([facility])    PAF (paroxysmal atrial fibrillation) ([facility])    Endoleak post (EVAR) endovascular aneurysm repair, sequela    Carotid artery stenosis without cerebral infarction, bilateral    History of repair of aneurysm of abdominal aorta using endovascular stent graft    Atherosclerotic heart disease of native coronary artery with other forms of angina pectoris ([address])    Morbid obesity due to excess calories ([facility])    COPD, severe ([address])    Hypertensive crisis    Acute on chronic diastolic congestive heart failure (HCC)    Respiratory failure ([address])    Left atrial flutter by electrocardiogram ([address])    Persistent atrial fibrillation ([facility])    A-fib ([facility])    Obesity (BMI 30-39. 9)    Atrial fibrillation ([facility])    LI (dyspnea on exertion)    Chronic systolic (congestive) heart failure    Pneumonia    Acute on chronic respiratory failure with hypoxia (HCC)    Drug-induced insomnia (HCC)    Atrial tachycardia (HCC)    Acute on chronic diastolic heart failure (HCC)    Coronary artery disease involving native coronary artery of native heart without angina pectoris    Acute on chronic respiratory failure (HCC)    Congestive heart failure (HCC)    Hypokalemia    Chronic hypercapnic respiratory failure (HCC)    Anemia associated with acute blood loss    Bradycardia    Chronic respiratory failure with hypoxia (HCC) - uses continuous O2 at 4 lpm nasal canula    Chronic renal failure, stage 3 (moderate) (HCC)    Occult blood positive stool    CAD (coronary artery disease)    Acute GI bleeding    Acute on chronic anemia    Primary hypertension    Acute renal failure superimposed on stage 3a chronic kidney disease (HCC)    Symptomatic anemia    GI bleed    MARK (acute kidney injury) ([address])    Dependence on continuous supplemental oxygen    On continuous oral anticoagulation    Small bowel mass    Acute metabolic encephalopathy    Acute pulmonary edema (HCC)    Elevated brain natriuretic peptide (BNP) level    Hypoxia    Stage 3b chronic kidney disease (HCC)    Type 2 diabetes mellitus    SOB (shortness of breath)    Acute congestive heart failure, unspecified heart failure type (HCC)    Non-STEMI (non-ST elevated myocardial infarction) ([address])         Presenting symptoms:  Shortness of breath    Diagnostic workup:      CONSULTS DURING ADMISSION :   IP CONSULT TO HEART FAILURE NURSE/COORDINATOR  IP CONSULT TO DIETITIAN  IP CONSULT TO PULMONOLOGY      Patient was diagnosed with:  Acute on chronic hypercapnic respiratory failure  COPD exacerbation  Acute on chronic diastolic CHF:        Treatment while inpatient:  Carlos Luna is a 69 y.o. female with past medical history significant for COPD on 2-3 liters oxygen at baseline  Patient  presented to ER with  progressively worsening shortness of breath for roughly a week.

## 2023-09-03 NOTE — ACP (ADVANCE CARE PLANNING)
Advance Care Planning     Advance Care Planning Activator (Inpatient)  Conversation Note      Date of ACP Conversation: 9/3/2023     Conversation Conducted with: Patient with Decision Making Capacity    ACP Activator: Libertad Clarke RN    Health Care Decision Maker:     Current Designated Health Care Decision Maker:     Primary Decision Maker: Ruthann Driver Spouse - [phone]    Secondary Decision Maker: Harsh Arellano Child - [phone]    Care Preferences    Ventilation: \"If you were in your present state of health and suddenly became very ill and were unable to breathe on your own, what would your preference be about the use of a ventilator (breathing machine) if it were available to you? \"      Would the patient desire the use of ventilator (breathing machine)?: yes    \"If your health worsens and it becomes clear that your chance of recovery is unlikely, what would your preference be about the use of a ventilator (breathing machine) if it were available to you? \"     Would the patient desire the use of ventilator (breathing machine)?: No      Resuscitation  \"CPR works best to restart the heart when there is a sudden event, like a heart attack, in someone who is otherwise healthy. Unfortunately, CPR does not typically restart the heart for people who have serious health conditions or who are very sick. \"    \"In the event your heart stopped as a result of an underlying serious health condition, would you want attempts to be made to restart your heart (answer \"yes\" for attempt to resuscitate) or would you prefer a natural death (answer \"no\" for do not attempt to resuscitate)? \" yes       [] Yes   [x] No   Educated Patient / Connie Figueredo regarding differences between Advance Directives and portable DNR orders. Length of ACP Conversation in minutes:  5 minutes    Conversation Outcomes:  ACP discussion completed    *Patient currently listed at [facility].  Messaged Dr Lily Carlson to discuss and update code status accordingly. CABRERA Carvajal made aware as well.     Electronically signed by Dennie Keepers, RN Case Management on 9/3/2023 at 12:50 PM

## 2023-09-03 NOTE — H&P
Hospitalist  History and Physical    Late completion on the note. Pt was seen on 9/2/2023    Patient:  Bishop Dakins  MRN: [id number]  PCP: Radha Peace MD    CHIEF COMPLAINT:  sob      HISTORY OF PRESENT ILLNESS:   The patient Bishop Dakins is a 69 y. o.female with medical history significant for COPD, severe obstructive sleep apnea chronic hypercapnic respiratory failure CHF atrial fibrillation status post ablation coronary artery disease history of blood clots hypertension hyperlipidemia and abdominal aortic aneurysm status post endovascular repair. Patient also has history of GI bleed recently she is status post ablation of AVM    Patient presented again with gradually worsening lower extremity edema  And gradually worsening shortness of breath. Patient's  reported that patient has been very sleepy and lethargic. Been using 4 L nasal cannula oxygen. History of fever chills no cough or chest pain.       Past Medical History:        Diagnosis Date    AAA (abdominal aortic aneurysm) ([address])     pt states it is 4cm    AAA (abdominal aortic aneurysm) without rupture (HCC) 2/10/2015    Atrial fibrillation (HCC)     CAD (coronary artery disease)     CHF (congestive heart failure) (HCC)     COPD (chronic obstructive pulmonary disease) ([address])     History of blood clots     Hyperlipidemia     Hypertension        Past Surgical History:        Procedure Laterality Date    ABDOMINAL AORTIC ANEURYSM REPAIR      Endovascular abdominal AA    APPENDECTOMY  1990    incidental    BLADDER SUSPENSION      CAPSULE ENDOSCOPY N/A 5/22/2023    ESOPHAGEAL CAPSULE ENDOSCOPY performed by Robson Cano MD at [address] [place] [address] Right 11/28/2022    Cardiomems PA sensor device implant Left PA    [id number] [facility]  10/15/2013    COLECTOMY N/A 5/26/2023    LAPAROSCOPIC ASSISTED LYSIS OF ADHESIONS AND PUSH ENTEROSCOPY performed by Franco Delaney MD at [facility] atrial fibrillation  Patient in sinus rhythm  Continue with Cardizem and anticoagulation with Xarelto  Restarted on Tikosyn    Coronary artery disease  Suspected for non-ST elevation MI  Cardiology plans no further intervention      Chronic respiratory failure  Pulmonary is following  Plan is to continue with BiPAP  IV diuretics bronchodilators Dulera steroids      DVT and GI prophylaxis      DNR-CCA      Lisa Lovelace MD M.D    This note was transcribed using [employer]. Please disregard any translational errors.

## 2023-09-03 NOTE — CONSULTS
ENDOSCOPY    CARDIAC CATHETERIZATION Right 11/28/2022    Cardiomems PA sensor device implant Left PA    CARDIAC SURGERY      CATARACT REMOVAL      CHOLECYSTECTOMY  10/15/2013    COLECTOMY N/A 5/26/2023    LAPAROSCOPIC ASSISTED LYSIS OF ADHESIONS AND PUSH ENTEROSCOPY performed by Darin Guillermo MD at [address] [place] [address]  09/02/2007    dr Estephanie Birch and check in 5 years. COLONOSCOPY  06/16/2017    ok dr arndt, repeat 5 years    COLONOSCOPY N/A 5/10/2023    COLONOSCOPY performed by Sissy Cortes MD at [address] [place] [address] ([address] [place] [address])  1990    for benign tumor. just the uterus    JOINT REPLACEMENT  12/2013    right knee replacement    TONSILLECTOMY  as a child    TUMOR EXCISION      benign behind right ear about 2008    UPPER GASTROINTESTINAL ENDOSCOPY N/A 4/26/2023    EGD CONTROL HEMORRHAGE WITH APC TO AVM performed by Angel Mckeon MD at [address] [place] [address] N/A 5/24/2023    ENTEROSCOPY PUSH DIAGNOSTIC performed by Waylon De Jesus MD at [facility]: Allergies   Allergen Reactions    Morphine Rash     rash         Home Meds:   Prior to Admission medications    Medication Sig Start Date End Date Taking? Authorizing Provider   torsemide (DEMADEX) 20 MG tablet Take 1 tablet by mouth daily 8/25/23   Krys Pastrana MD   potassium chloride (KLOR-CON M) 20 MEQ extended release tablet Take 2 tablets by mouth daily  Patient taking differently: Take 2 tablets by mouth daily Patient only taking once a day. 8/25/23   Krys Pastrana MD   acetaZOLAMIDE (DIAMOX) 125 MG tablet Take 1 tablet by mouth 2 times daily 8/25/23   Krys Pastrana MD   metOLazone (ZAROXOLYN) 2.5 MG tablet Take 1 tablet by mouth daily as needed (shortness of breath and edema) Take 30 minutes prior to AM dose of Demadex.  8/16/23   Krys Pastrana MD   carvedilol (COREG) 6.25 MG tablet Take 1 tablet by mouth 2 times daily (with meals) 8/9/23   Jennifer James 09/01/23    XR CHEST PORTABLE    Narrative  EXAMINATION:  ONE XRAY VIEW OF THE CHEST    9/1/2023 4:42 pm    COMPARISON:  None. HISTORY:  ORDERING SYSTEM PROVIDED HISTORY: cp  TECHNOLOGIST PROVIDED HISTORY:  Reason for exam:->cp  Reason for Exam: Pt. Was at Dr. Margie Lisa office for LIFESCAPE, sent  to ED    FINDINGS:  Normal cardiomediastinal silhouette. No acute airspace infiltrate.   No  pneumothorax or pleural effusion    Impression  No acute cardiopulmonary findings        Hua Denney MD, MGaetanoD.            9/3/2023, 10:13 AM

## 2023-09-03 NOTE — PROGRESS NOTES
Pt lethargic and only opening eyes to stimulus closing eyes shortly after. Pt on bipap since 1700 at 30% FiO2. Most recent VBGs pH: 7.46 pCO2: 53.3 O2 sats 97. MD made aware ordered stat VBG. RT notified. Will continue to monitor.

## 2023-09-03 NOTE — CARE COORDINATION
09/03/23 1229   Readmission Assessment   Number of Days since last admission? 8-30 days   Previous Disposition Home with Home Health   Who is being Interviewed Patient;Caregiver   What was the patient's/caregiver's perception as to why they think they needed to return back to the hospital? Other (Comment)  (shortness of breath)   Did you visit your Primary Care Physician after you left the hospital, before you returned this time? No   Why weren't you able to visit your PCP? Did not have an appointment   Did you see a specialist, such as Cardiac, Pulmonary, Orthopedic Physician, etc. after you left the hospital? Yes  (saw Cardiologist)   Who advised the patient to return to the hospital? Physician   Does the patient report anything that got in the way of taking their medications? No   In our efforts to provide the best possible care to you and others like you, can you think of anything that we could have done to help you after you left the hospital the first time, so that you might not have needed to return so soon? Other (Comment)  (Patient's  states \"she needs a BIPAP. \")

## 2023-09-03 NOTE — PROGRESS NOTES
Patient wearing BiPap this shift. Removing for meals and PO meds. Patient tolerating well. Per Dr. Eloise Gilman, patient to wear during day with breaks and continuously at night. RT and patient/family updated. Patient A/O, interactive. Tolerating PO. Bed alarm on and call light within reach. Care ongoing.

## 2023-09-03 NOTE — PROGRESS NOTES
Pt more alert. RT adjusted bipap settings. New VBGs pH: 7.39 pCO2: 63.4. MD in pt room ordered to repeat VBGs in 1 hour if pt condition worsens. Otherwise repeat VBGs at 0600 today. Will continue to monitor.

## 2023-09-03 NOTE — CONSULTS
Nutrition Note    Consult fro heart failure diet education. Pt previously seen by RD at this facility for heart failure diet education. On recent admission, pt expressed good understanding of low sodium diet to RD. No further nutrition education needed at this time. The patient will still be monitored per nutrition standards of care. Consult dietitian if nutrition interventions essential to patient care is needed.      Edenilson Walker, [place]  [phone]

## 2023-09-04 LAB
ANION GAP SERPL CALCULATED.3IONS-SCNC: 8 MMOL/L (ref 3–16)
BASE EXCESS BLDV CALC-SCNC: 12.8 MMOL/L
BUN SERPL-MCNC: 39 MG/DL (ref 7–20)
CALCIUM SERPL-MCNC: 9.2 MG/DL (ref 8.3–10.6)
CHLORIDE SERPL-SCNC: 91 MMOL/L (ref 99–110)
CO2 BLDV-SCNC: 42 MMOL/L
CO2 SERPL-SCNC: 38 MMOL/L (ref 21–32)
COHGB MFR BLDV: 1.4 %
CREAT SERPL-MCNC: 1.9 MG/DL (ref 0.6–1.2)
DEPRECATED RDW RBC AUTO: 17.3 % (ref 12.4–15.4)
GFR SERPLBLD CREATININE-BSD FMLA CKD-EPI: 27 ML/MIN/{1.73_M2}
GLUCOSE SERPL-MCNC: 137 MG/DL (ref 70–99)
HCO3 BLDV-SCNC: 40 MMOL/L (ref 23–29)
HCT VFR BLD AUTO: 28.6 % (ref 36–48)
HGB BLD-MCNC: 9.3 G/DL (ref 12–16)
MAGNESIUM SERPL-MCNC: 2 MG/DL (ref 1.8–2.4)
MCH RBC QN AUTO: 28.3 PG (ref 26–34)
MCHC RBC AUTO-ENTMCNC: 32.5 G/DL (ref 31–36)
MCV RBC AUTO: 87.1 FL (ref 80–100)
METHGB MFR BLDV: 0.6 %
NT-PROBNP SERPL-MCNC: 5671 PG/ML (ref 0–449)
O2 THERAPY: ABNORMAL
PCO2 BLDV: 67.1 MMHG (ref 40–50)
PH BLDV: 7.38 [PH] (ref 7.35–7.45)
PLATELET # BLD AUTO: 266 K/UL (ref 135–450)
PMV BLD AUTO: 9.8 FL (ref 5–10.5)
PO2 BLDV: 31 MMHG
POTASSIUM SERPL-SCNC: 3.5 MMOL/L (ref 3.5–5.1)
RBC # BLD AUTO: 3.28 M/UL (ref 4–5.2)
SAO2 % BLDV: 51 %
SODIUM SERPL-SCNC: 137 MMOL/L (ref 136–145)
WBC # BLD AUTO: 20.1 K/UL (ref 4–11)

## 2023-09-04 PROCEDURE — 2700000000 HC OXYGEN THERAPY PER DAY

## 2023-09-04 PROCEDURE — 6360000002 HC RX W HCPCS: Performed by: HOSPITALIST

## 2023-09-04 PROCEDURE — 2580000003 HC RX 258: Performed by: INTERNAL MEDICINE

## 2023-09-04 PROCEDURE — 99233 SBSQ HOSP IP/OBS HIGH 50: CPT | Performed by: INTERNAL MEDICINE

## 2023-09-04 PROCEDURE — 6370000000 HC RX 637 (ALT 250 FOR IP): Performed by: HOSPITALIST

## 2023-09-04 PROCEDURE — 6360000002 HC RX W HCPCS: Performed by: INTERNAL MEDICINE

## 2023-09-04 PROCEDURE — 85027 COMPLETE CBC AUTOMATED: CPT

## 2023-09-04 PROCEDURE — 94640 AIRWAY INHALATION TREATMENT: CPT

## 2023-09-04 PROCEDURE — 6370000000 HC RX 637 (ALT 250 FOR IP): Performed by: INTERNAL MEDICINE

## 2023-09-04 PROCEDURE — 36415 COLL VENOUS BLD VENIPUNCTURE: CPT

## 2023-09-04 PROCEDURE — 94660 CPAP INITIATION&MGMT: CPT

## 2023-09-04 PROCEDURE — 83880 ASSAY OF NATRIURETIC PEPTIDE: CPT

## 2023-09-04 PROCEDURE — 94761 N-INVAS EAR/PLS OXIMETRY MLT: CPT

## 2023-09-04 PROCEDURE — 2580000003 HC RX 258: Performed by: HOSPITALIST

## 2023-09-04 PROCEDURE — 2060000000 HC ICU INTERMEDIATE R&B

## 2023-09-04 PROCEDURE — 80048 BASIC METABOLIC PNL TOTAL CA: CPT

## 2023-09-04 PROCEDURE — 83735 ASSAY OF MAGNESIUM: CPT

## 2023-09-04 PROCEDURE — 82803 BLOOD GASES ANY COMBINATION: CPT

## 2023-09-04 RX ORDER — CLONIDINE HYDROCHLORIDE 0.1 MG/1
0.1 TABLET ORAL 2 TIMES DAILY
Status: DISCONTINUED | OUTPATIENT
Start: 2023-09-04 | End: 2023-09-09

## 2023-09-04 RX ADMIN — PANTOPRAZOLE SODIUM 40 MG: 40 TABLET, DELAYED RELEASE ORAL at 06:45

## 2023-09-04 RX ADMIN — SODIUM CHLORIDE, PRESERVATIVE FREE 10 ML: 5 INJECTION INTRAVENOUS at 15:13

## 2023-09-04 RX ADMIN — ACETAZOLAMIDE 125 MG: 250 TABLET ORAL at 21:36

## 2023-09-04 RX ADMIN — HYDRALAZINE HYDROCHLORIDE 50 MG: 50 TABLET, FILM COATED ORAL at 15:13

## 2023-09-04 RX ADMIN — CEFAZOLIN 2000 MG: 2 INJECTION, POWDER, FOR SOLUTION INTRAMUSCULAR; INTRAVENOUS at 23:25

## 2023-09-04 RX ADMIN — SODIUM CHLORIDE, PRESERVATIVE FREE 10 ML: 5 INJECTION INTRAVENOUS at 21:37

## 2023-09-04 RX ADMIN — IPRATROPIUM BROMIDE AND ALBUTEROL SULFATE 1 DOSE: 2.5; .5 SOLUTION RESPIRATORY (INHALATION) at 01:06

## 2023-09-04 RX ADMIN — MOMETASONE FUROATE AND FORMOTEROL FUMARATE DIHYDRATE 2 PUFF: 200; 5 AEROSOL RESPIRATORY (INHALATION) at 07:39

## 2023-09-04 RX ADMIN — POTASSIUM CHLORIDE 40 MEQ: 1500 TABLET, EXTENDED RELEASE ORAL at 08:32

## 2023-09-04 RX ADMIN — CARVEDILOL 6.25 MG: 6.25 TABLET, FILM COATED ORAL at 17:14

## 2023-09-04 RX ADMIN — IPRATROPIUM BROMIDE AND ALBUTEROL SULFATE 1 DOSE: 2.5; .5 SOLUTION RESPIRATORY (INHALATION) at 07:39

## 2023-09-04 RX ADMIN — IPRATROPIUM BROMIDE AND ALBUTEROL SULFATE 1 DOSE: 2.5; .5 SOLUTION RESPIRATORY (INHALATION) at 11:32

## 2023-09-04 RX ADMIN — HYDRALAZINE HYDROCHLORIDE 50 MG: 50 TABLET, FILM COATED ORAL at 21:36

## 2023-09-04 RX ADMIN — IPRATROPIUM BROMIDE AND ALBUTEROL SULFATE 1 DOSE: 2.5; .5 SOLUTION RESPIRATORY (INHALATION) at 19:58

## 2023-09-04 RX ADMIN — FUROSEMIDE 60 MG: 10 INJECTION, SOLUTION INTRAMUSCULAR; INTRAVENOUS at 08:30

## 2023-09-04 RX ADMIN — ACETAZOLAMIDE 125 MG: 250 TABLET ORAL at 08:33

## 2023-09-04 RX ADMIN — METHYLPREDNISOLONE SODIUM SUCCINATE 40 MG: 40 INJECTION, POWDER, FOR SOLUTION INTRAMUSCULAR; INTRAVENOUS at 08:32

## 2023-09-04 RX ADMIN — ACETAMINOPHEN 650 MG: 325 TABLET ORAL at 17:09

## 2023-09-04 RX ADMIN — DILTIAZEM HYDROCHLORIDE 120 MG: 120 CAPSULE, COATED, EXTENDED RELEASE ORAL at 08:31

## 2023-09-04 RX ADMIN — IPRATROPIUM BROMIDE AND ALBUTEROL SULFATE 1 DOSE: 2.5; .5 SOLUTION RESPIRATORY (INHALATION) at 04:19

## 2023-09-04 RX ADMIN — DOFETILIDE 250 MCG: 0.25 CAPSULE ORAL at 08:33

## 2023-09-04 RX ADMIN — CEFAZOLIN 2000 MG: 2 INJECTION, POWDER, FOR SOLUTION INTRAMUSCULAR; INTRAVENOUS at 15:38

## 2023-09-04 RX ADMIN — MOMETASONE FUROATE AND FORMOTEROL FUMARATE DIHYDRATE 2 PUFF: 200; 5 AEROSOL RESPIRATORY (INHALATION) at 19:58

## 2023-09-04 RX ADMIN — FERROUS SULFATE TAB EC 324 MG (65 MG FE EQUIVALENT) 324 MG: 324 (65 FE) TABLET DELAYED RESPONSE at 08:31

## 2023-09-04 RX ADMIN — RANOLAZINE 500 MG: 500 TABLET, FILM COATED, EXTENDED RELEASE ORAL at 08:31

## 2023-09-04 RX ADMIN — FUROSEMIDE 60 MG: 10 INJECTION, SOLUTION INTRAMUSCULAR; INTRAVENOUS at 17:18

## 2023-09-04 RX ADMIN — OXYCODONE HYDROCHLORIDE AND ACETAMINOPHEN 500 MG: 500 TABLET ORAL at 08:32

## 2023-09-04 RX ADMIN — CARVEDILOL 6.25 MG: 6.25 TABLET, FILM COATED ORAL at 08:31

## 2023-09-04 RX ADMIN — DOFETILIDE 250 MCG: 0.25 CAPSULE ORAL at 21:44

## 2023-09-04 RX ADMIN — AMPICILLIN SODIUM AND SULBACTAM SODIUM 1500 MG: 1; .5 INJECTION, POWDER, FOR SOLUTION INTRAMUSCULAR; INTRAVENOUS at 21:41

## 2023-09-04 RX ADMIN — FERROUS SULFATE TAB EC 324 MG (65 MG FE EQUIVALENT) 324 MG: 324 (65 FE) TABLET DELAYED RESPONSE at 17:14

## 2023-09-04 RX ADMIN — EMPAGLIFLOZIN 10 MG: 10 TABLET, FILM COATED ORAL at 08:32

## 2023-09-04 RX ADMIN — HYDRALAZINE HYDROCHLORIDE 50 MG: 50 TABLET, FILM COATED ORAL at 06:45

## 2023-09-04 RX ADMIN — ACETAMINOPHEN 650 MG: 325 TABLET ORAL at 08:32

## 2023-09-04 RX ADMIN — CLONIDINE HYDROCHLORIDE 0.1 MG: 0.1 TABLET ORAL at 20:09

## 2023-09-04 RX ADMIN — RIVAROXABAN 15 MG: 15 TABLET, FILM COATED ORAL at 08:32

## 2023-09-04 RX ADMIN — ISOSORBIDE MONONITRATE 120 MG: 60 TABLET, EXTENDED RELEASE ORAL at 08:31

## 2023-09-04 RX ADMIN — RANOLAZINE 500 MG: 500 TABLET, FILM COATED, EXTENDED RELEASE ORAL at 21:36

## 2023-09-04 RX ADMIN — ROSUVASTATIN CALCIUM 20 MG: 20 TABLET, FILM COATED ORAL at 15:14

## 2023-09-04 RX ADMIN — AMPICILLIN SODIUM AND SULBACTAM SODIUM 1500 MG: 1; .5 INJECTION, POWDER, FOR SOLUTION INTRAMUSCULAR; INTRAVENOUS at 17:12

## 2023-09-04 ASSESSMENT — PAIN DESCRIPTION - FREQUENCY
FREQUENCY: CONTINUOUS

## 2023-09-04 ASSESSMENT — PAIN DESCRIPTION - PAIN TYPE
TYPE: ACUTE PAIN

## 2023-09-04 ASSESSMENT — PAIN - FUNCTIONAL ASSESSMENT
PAIN_FUNCTIONAL_ASSESSMENT: PREVENTS OR INTERFERES SOME ACTIVE ACTIVITIES AND ADLS

## 2023-09-04 ASSESSMENT — PAIN SCALES - GENERAL
PAINLEVEL_OUTOF10: 6
PAINLEVEL_OUTOF10: 7
PAINLEVEL_OUTOF10: 9
PAINLEVEL_OUTOF10: 5
PAINLEVEL_OUTOF10: 7
PAINLEVEL_OUTOF10: 6

## 2023-09-04 ASSESSMENT — PAIN DESCRIPTION - LOCATION
LOCATION: NECK

## 2023-09-04 ASSESSMENT — PAIN SCALES - WONG BAKER
WONGBAKER_NUMERICALRESPONSE: 0

## 2023-09-04 ASSESSMENT — PAIN DESCRIPTION - ORIENTATION
ORIENTATION: LEFT

## 2023-09-04 ASSESSMENT — PAIN DESCRIPTION - DESCRIPTORS
DESCRIPTORS: ACHING
DESCRIPTORS: ACHING
DESCRIPTORS: THROBBING
DESCRIPTORS: THROBBING

## 2023-09-04 ASSESSMENT — PAIN DESCRIPTION - ONSET
ONSET: ON-GOING

## 2023-09-04 NOTE — PROGRESS NOTES
Hospitalist Progress Note  9/4/2023 9:21 AM    PCP: Yuri Shen MD    [phone]     Date of Admission: 9/1/2023                                                                                                                     HOSPITAL COURSE    Patient demographics:  The patient  Marti Fry is a 69 y.o. female     Significant past medical history:   Patient Active Problem List   Diagnosis    Essential hypertension    Hyperlipidemia    Coronary atherosclerosis due to calcified coronary lesion of native artery    DENISE (obstructive sleep apnea)    History of DVT (deep vein thrombosis)    Family history of DVT    AAA (abdominal aortic aneurysm) without rupture ([facility])    Pleural effusion, left    Pleural effusion    COPD exacerbation ([facility])    Pelvic pain in female    Vitamin D deficiency    Other emphysema ([facility])    Acute bronchitis    Acute respiratory failure with hypoxia ([facility])    Abnormal CXR    Atypical pneumonia    Atrial fibrillation with RVR ([facility])    Chronic diastolic (congestive) heart failure ([facility])    PVD (peripheral vascular disease) ([facility])    Abnormal nuclear stress test    AAA (abdominal aortic aneurysm) ([facility])    PAF (paroxysmal atrial fibrillation) ([facility])    Endoleak post (EVAR) endovascular aneurysm repair, sequela    Carotid artery stenosis without cerebral infarction, bilateral    History of repair of aneurysm of abdominal aorta using endovascular stent graft    Atherosclerotic heart disease of native coronary artery with other forms of angina pectoris ([address])    Morbid obesity due to excess calories ([facility])    COPD, severe ([address])    Hypertensive crisis    Acute on chronic diastolic congestive heart failure (HCC)    Respiratory failure ([address])    Left atrial flutter by electrocardiogram ([address])    Persistent atrial fibrillation (HCC)    A-fib ([facility])    Obesity (BMI 30-39. 9)    Atrial fibrillation ([facility])    LI (dyspnea on exertion)    Chronic systolic (congestive) heart failure    Pneumonia    Acute on chronic

## 2023-09-04 NOTE — PROGRESS NOTES
with an ejection   fraction of 55-60%. Diastolic filling parameters suggest grade III diastolic dysfunction. The left atrium is mildly dilated. Estimated pulmonary artery systolic pressure is moderately elevated at 51   mmHg assuming a right atrial pressure of 15 mmHg. Acute on chronic diastolic heart failure     Patient was sent to emergency room from my office with worsening shortness of breath and diminished level of consciousness  -Data reviewed   -proBNP is elevated but chest x-ray surprisingly shows no obvious pulmonary vascular congestion but clinically she appears to have a decompensated congestive heart failure  CardioMEMS reading on 9/3/2023 show soed PA mean at 35 which is significantly elevated  -She seems to have responded well to increased dose of Lasix and p.o. Zaroxolyn therapy  -We will continue Lasix 60 mg IV twice daily  -We will continue to follow renal function weight I's and O's closely  -Continue beta-blocker and Jardiance therapy  -Not an ideal candidate for ACE inhibitor or ARB or Arni therapy due to her EF greater than 40% and renal insufficiency  -We will have her CardioMEMS reading to help us with her heart failure therapy  -NYHA class III-IV   Patient and her  has agreed to DNR CCA     Diminished level of consciousness  --Much better after she was placed on BiPAP therapy  -Appreciate pulmonary recommendations     PAF  -Patient stays in sinus rhythm on Cardizem and Tikosyn therapy   -Also on chronic Xarelto therapy  -We will restart Tikosyn.       Hypertension  -Blood pressure is significantly elevated partly due to the pain in her left mandible region  -Currently on carvedilol diltiazem and hydralazine  Coronary artery disease involving native coronary artery of native heart without angina pectoris  Troponin is elevated suspicious for NSTEMI  -[facility] 9/20/22 s/p PCI of proximal Lcx with DESx2 by Dr. Terence Jones  [facility] 11/4/22 acute on chronic diastolic heart failure

## 2023-09-04 NOTE — PROGRESS NOTES
Patient called out several times before the hour was up to have her BiPAP removed. I did make it in there and did remove her mask and reminded her as to why we need to use this mask. She states that she understands that but she says \"it's not working. \" I told her we will try again later.  She is now on 5L O2 nasal cannula

## 2023-09-04 NOTE — PROGRESS NOTES
Perfect served dr Gasper Mcconnell  =pt c/o of swelling and very painful left neck Daksha Irizarry ,, she stated this happened to her on the right side in the past and she had surgery ,,

## 2023-09-04 NOTE — PROGRESS NOTES
Patient put back on BiPAP after nighttime meds @ 2056. Patient then called out @ 2115 to have the mask taken off. I told her I would remove it in 1 hour.

## 2023-09-05 ENCOUNTER — APPOINTMENT (OUTPATIENT)
Dept: ULTRASOUND IMAGING | Age: 78
DRG: 291 | End: 2023-09-05
Payer: MEDICARE

## 2023-09-05 ENCOUNTER — APPOINTMENT (OUTPATIENT)
Dept: CT IMAGING | Age: 78
DRG: 291 | End: 2023-09-05
Payer: MEDICARE

## 2023-09-05 LAB
ANION GAP SERPL CALCULATED.3IONS-SCNC: 10 MMOL/L (ref 3–16)
ANION GAP SERPL CALCULATED.3IONS-SCNC: 11 MMOL/L (ref 3–16)
BACTERIA BLD CULT ORG #2: NORMAL
BACTERIA BLD CULT: NORMAL
BACTERIA URNS QL MICRO: ABNORMAL /HPF
BILIRUB UR QL STRIP.AUTO: NEGATIVE
BUN SERPL-MCNC: 52 MG/DL (ref 7–20)
BUN SERPL-MCNC: 53 MG/DL (ref 7–20)
CALCIUM SERPL-MCNC: 8.5 MG/DL (ref 8.3–10.6)
CALCIUM SERPL-MCNC: 9 MG/DL (ref 8.3–10.6)
CHLORIDE SERPL-SCNC: 91 MMOL/L (ref 99–110)
CHLORIDE SERPL-SCNC: 93 MMOL/L (ref 99–110)
CLARITY UR: ABNORMAL
CO2 SERPL-SCNC: 35 MMOL/L (ref 21–32)
CO2 SERPL-SCNC: 38 MMOL/L (ref 21–32)
COLOR UR: YELLOW
CREAT SERPL-MCNC: 2.4 MG/DL (ref 0.6–1.2)
CREAT SERPL-MCNC: 2.5 MG/DL (ref 0.6–1.2)
DEPRECATED RDW RBC AUTO: 17.8 % (ref 12.4–15.4)
EPI CELLS #/AREA URNS AUTO: 1 /HPF (ref 0–5)
GFR SERPLBLD CREATININE-BSD FMLA CKD-EPI: 19 ML/MIN/{1.73_M2}
GFR SERPLBLD CREATININE-BSD FMLA CKD-EPI: 20 ML/MIN/{1.73_M2}
GLUCOSE SERPL-MCNC: 135 MG/DL (ref 70–99)
GLUCOSE SERPL-MCNC: 140 MG/DL (ref 70–99)
GLUCOSE UR STRIP.AUTO-MCNC: 100 MG/DL
HCT VFR BLD AUTO: 34.3 % (ref 36–48)
HGB BLD-MCNC: 11.4 G/DL (ref 12–16)
HGB UR QL STRIP.AUTO: ABNORMAL
HYALINE CASTS #/AREA URNS AUTO: 1 /LPF (ref 0–8)
KETONES UR STRIP.AUTO-MCNC: NEGATIVE MG/DL
LEUKOCYTE ESTERASE UR QL STRIP.AUTO: ABNORMAL
MAGNESIUM SERPL-MCNC: 2 MG/DL (ref 1.8–2.4)
MCH RBC QN AUTO: 29.1 PG (ref 26–34)
MCHC RBC AUTO-ENTMCNC: 33.1 G/DL (ref 31–36)
MCV RBC AUTO: 87.8 FL (ref 80–100)
NITRITE UR QL STRIP.AUTO: NEGATIVE
PH UR STRIP.AUTO: 7 [PH] (ref 5–8)
PLATELET # BLD AUTO: 319 K/UL (ref 135–450)
PMV BLD AUTO: 9.9 FL (ref 5–10.5)
POTASSIUM SERPL-SCNC: 3.4 MMOL/L (ref 3.5–5.1)
POTASSIUM SERPL-SCNC: 4.2 MMOL/L (ref 3.5–5.1)
PROT UR STRIP.AUTO-MCNC: 30 MG/DL
RBC # BLD AUTO: 3.91 M/UL (ref 4–5.2)
RBC CLUMPS #/AREA URNS AUTO: 1217 /HPF (ref 0–4)
SODIUM SERPL-SCNC: 138 MMOL/L (ref 136–145)
SODIUM SERPL-SCNC: 140 MMOL/L (ref 136–145)
SP GR UR STRIP.AUTO: 1.01 (ref 1–1.03)
UA DIPSTICK W REFLEX MICRO PNL UR: YES
URN SPEC COLLECT METH UR: ABNORMAL
UROBILINOGEN UR STRIP-ACNC: 1 E.U./DL
WBC # BLD AUTO: 31 K/UL (ref 4–11)
WBC #/AREA URNS AUTO: 23 /HPF (ref 0–5)

## 2023-09-05 PROCEDURE — 6360000002 HC RX W HCPCS: Performed by: INTERNAL MEDICINE

## 2023-09-05 PROCEDURE — 94640 AIRWAY INHALATION TREATMENT: CPT

## 2023-09-05 PROCEDURE — 6370000000 HC RX 637 (ALT 250 FOR IP): Performed by: HOSPITALIST

## 2023-09-05 PROCEDURE — 2060000000 HC ICU INTERMEDIATE R&B

## 2023-09-05 PROCEDURE — 99222 1ST HOSP IP/OBS MODERATE 55: CPT | Performed by: STUDENT IN AN ORGANIZED HEALTH CARE EDUCATION/TRAINING PROGRAM

## 2023-09-05 PROCEDURE — 6370000000 HC RX 637 (ALT 250 FOR IP): Performed by: REGISTERED NURSE

## 2023-09-05 PROCEDURE — 6370000000 HC RX 637 (ALT 250 FOR IP): Performed by: INTERNAL MEDICINE

## 2023-09-05 PROCEDURE — 87086 URINE CULTURE/COLONY COUNT: CPT

## 2023-09-05 PROCEDURE — 83735 ASSAY OF MAGNESIUM: CPT

## 2023-09-05 PROCEDURE — 85027 COMPLETE CBC AUTOMATED: CPT

## 2023-09-05 PROCEDURE — 80048 BASIC METABOLIC PNL TOTAL CA: CPT

## 2023-09-05 PROCEDURE — 2580000003 HC RX 258: Performed by: INTERNAL MEDICINE

## 2023-09-05 PROCEDURE — 2700000000 HC OXYGEN THERAPY PER DAY

## 2023-09-05 PROCEDURE — 6360000002 HC RX W HCPCS: Performed by: REGISTERED NURSE

## 2023-09-05 PROCEDURE — 81001 URINALYSIS AUTO W/SCOPE: CPT

## 2023-09-05 PROCEDURE — 2580000003 HC RX 258: Performed by: HOSPITALIST

## 2023-09-05 PROCEDURE — 94761 N-INVAS EAR/PLS OXIMETRY MLT: CPT

## 2023-09-05 PROCEDURE — 99233 SBSQ HOSP IP/OBS HIGH 50: CPT | Performed by: INTERNAL MEDICINE

## 2023-09-05 PROCEDURE — 70490 CT SOFT TISSUE NECK W/O DYE: CPT

## 2023-09-05 PROCEDURE — 76536 US EXAM OF HEAD AND NECK: CPT

## 2023-09-05 PROCEDURE — 36415 COLL VENOUS BLD VENIPUNCTURE: CPT

## 2023-09-05 PROCEDURE — 99232 SBSQ HOSP IP/OBS MODERATE 35: CPT | Performed by: NURSE PRACTITIONER

## 2023-09-05 PROCEDURE — 6360000002 HC RX W HCPCS: Performed by: HOSPITALIST

## 2023-09-05 RX ORDER — LABETALOL HYDROCHLORIDE 5 MG/ML
10 INJECTION, SOLUTION INTRAVENOUS ONCE
Status: COMPLETED | OUTPATIENT
Start: 2023-09-05 | End: 2023-09-05

## 2023-09-05 RX ORDER — TRAMADOL HYDROCHLORIDE 50 MG/1
50 TABLET ORAL EVERY 12 HOURS PRN
Status: DISCONTINUED | OUTPATIENT
Start: 2023-09-05 | End: 2023-09-17 | Stop reason: HOSPADM

## 2023-09-05 RX ORDER — LABETALOL HYDROCHLORIDE 5 MG/ML
5 INJECTION, SOLUTION INTRAVENOUS EVERY 4 HOURS PRN
Status: DISCONTINUED | OUTPATIENT
Start: 2023-09-05 | End: 2023-09-17 | Stop reason: HOSPADM

## 2023-09-05 RX ORDER — TRAMADOL HYDROCHLORIDE 50 MG/1
50 TABLET ORAL EVERY 6 HOURS PRN
Status: DISCONTINUED | OUTPATIENT
Start: 2023-09-05 | End: 2023-09-05

## 2023-09-05 RX ORDER — VANCOMYCIN 1.75 G/350ML
1250 INJECTION, SOLUTION INTRAVENOUS EVERY 12 HOURS
Status: DISCONTINUED | OUTPATIENT
Start: 2023-09-05 | End: 2023-09-05

## 2023-09-05 RX ADMIN — RANOLAZINE 500 MG: 500 TABLET, FILM COATED, EXTENDED RELEASE ORAL at 09:47

## 2023-09-05 RX ADMIN — AMPICILLIN SODIUM AND SULBACTAM SODIUM 1500 MG: 1; .5 INJECTION, POWDER, FOR SOLUTION INTRAMUSCULAR; INTRAVENOUS at 02:07

## 2023-09-05 RX ADMIN — DOFETILIDE 250 MCG: 0.25 CAPSULE ORAL at 22:15

## 2023-09-05 RX ADMIN — AMPICILLIN SODIUM AND SULBACTAM SODIUM 1500 MG: 1; .5 INJECTION, POWDER, FOR SOLUTION INTRAMUSCULAR; INTRAVENOUS at 10:03

## 2023-09-05 RX ADMIN — PANTOPRAZOLE SODIUM 40 MG: 40 TABLET, DELAYED RELEASE ORAL at 06:33

## 2023-09-05 RX ADMIN — CEFAZOLIN 2000 MG: 2 INJECTION, POWDER, FOR SOLUTION INTRAMUSCULAR; INTRAVENOUS at 06:32

## 2023-09-05 RX ADMIN — HYDRALAZINE HYDROCHLORIDE 50 MG: 50 TABLET, FILM COATED ORAL at 06:33

## 2023-09-05 RX ADMIN — CARVEDILOL 6.25 MG: 6.25 TABLET, FILM COATED ORAL at 17:56

## 2023-09-05 RX ADMIN — IPRATROPIUM BROMIDE AND ALBUTEROL SULFATE 1 DOSE: 2.5; .5 SOLUTION RESPIRATORY (INHALATION) at 08:06

## 2023-09-05 RX ADMIN — HYDRALAZINE HYDROCHLORIDE 50 MG: 50 TABLET, FILM COATED ORAL at 22:15

## 2023-09-05 RX ADMIN — FERROUS SULFATE TAB EC 324 MG (65 MG FE EQUIVALENT) 324 MG: 324 (65 FE) TABLET DELAYED RESPONSE at 17:56

## 2023-09-05 RX ADMIN — DOFETILIDE 250 MCG: 0.25 CAPSULE ORAL at 09:47

## 2023-09-05 RX ADMIN — ACETAZOLAMIDE 125 MG: 250 TABLET ORAL at 22:15

## 2023-09-05 RX ADMIN — MOMETASONE FUROATE AND FORMOTEROL FUMARATE DIHYDRATE 2 PUFF: 200; 5 AEROSOL RESPIRATORY (INHALATION) at 08:07

## 2023-09-05 RX ADMIN — METHYLPREDNISOLONE SODIUM SUCCINATE 40 MG: 40 INJECTION, POWDER, FOR SOLUTION INTRAMUSCULAR; INTRAVENOUS at 09:48

## 2023-09-05 RX ADMIN — MOMETASONE FUROATE AND FORMOTEROL FUMARATE DIHYDRATE 2 PUFF: 200; 5 AEROSOL RESPIRATORY (INHALATION) at 20:28

## 2023-09-05 RX ADMIN — DILTIAZEM HYDROCHLORIDE 120 MG: 120 CAPSULE, COATED, EXTENDED RELEASE ORAL at 09:47

## 2023-09-05 RX ADMIN — RANOLAZINE 500 MG: 500 TABLET, FILM COATED, EXTENDED RELEASE ORAL at 22:15

## 2023-09-05 RX ADMIN — ISOSORBIDE MONONITRATE 120 MG: 60 TABLET, EXTENDED RELEASE ORAL at 09:47

## 2023-09-05 RX ADMIN — IPRATROPIUM BROMIDE AND ALBUTEROL SULFATE 1 DOSE: 2.5; .5 SOLUTION RESPIRATORY (INHALATION) at 20:28

## 2023-09-05 RX ADMIN — RIVAROXABAN 15 MG: 15 TABLET, FILM COATED ORAL at 09:47

## 2023-09-05 RX ADMIN — IPRATROPIUM BROMIDE AND ALBUTEROL SULFATE 1 DOSE: 2.5; .5 SOLUTION RESPIRATORY (INHALATION) at 16:16

## 2023-09-05 RX ADMIN — SODIUM CHLORIDE, PRESERVATIVE FREE 10 ML: 5 INJECTION INTRAVENOUS at 22:17

## 2023-09-05 RX ADMIN — IPRATROPIUM BROMIDE AND ALBUTEROL SULFATE 1 DOSE: 2.5; .5 SOLUTION RESPIRATORY (INHALATION) at 12:07

## 2023-09-05 RX ADMIN — CEFEPIME 2000 MG: 2 INJECTION, POWDER, FOR SOLUTION INTRAVENOUS at 15:34

## 2023-09-05 RX ADMIN — FERROUS SULFATE TAB EC 324 MG (65 MG FE EQUIVALENT) 324 MG: 324 (65 FE) TABLET DELAYED RESPONSE at 09:47

## 2023-09-05 RX ADMIN — OXYCODONE HYDROCHLORIDE AND ACETAMINOPHEN 500 MG: 500 TABLET ORAL at 09:47

## 2023-09-05 RX ADMIN — POTASSIUM CHLORIDE 40 MEQ: 1500 TABLET, EXTENDED RELEASE ORAL at 09:47

## 2023-09-05 RX ADMIN — FUROSEMIDE 60 MG: 10 INJECTION, SOLUTION INTRAMUSCULAR; INTRAVENOUS at 09:48

## 2023-09-05 RX ADMIN — ACETAZOLAMIDE 125 MG: 250 TABLET ORAL at 09:47

## 2023-09-05 RX ADMIN — TRAMADOL HYDROCHLORIDE 50 MG: 50 TABLET ORAL at 01:26

## 2023-09-05 RX ADMIN — LABETALOL HYDROCHLORIDE 10 MG: 5 INJECTION, SOLUTION INTRAVENOUS at 04:34

## 2023-09-05 RX ADMIN — HYDRALAZINE HYDROCHLORIDE 50 MG: 50 TABLET, FILM COATED ORAL at 15:35

## 2023-09-05 RX ADMIN — EMPAGLIFLOZIN 10 MG: 10 TABLET, FILM COATED ORAL at 09:47

## 2023-09-05 RX ADMIN — SODIUM CHLORIDE, PRESERVATIVE FREE 10 ML: 5 INJECTION INTRAVENOUS at 09:49

## 2023-09-05 RX ADMIN — ROSUVASTATIN CALCIUM 20 MG: 20 TABLET, FILM COATED ORAL at 09:47

## 2023-09-05 RX ADMIN — VANCOMYCIN HYDROCHLORIDE 1500 MG: 1.5 INJECTION, POWDER, LYOPHILIZED, FOR SOLUTION INTRAVENOUS at 16:06

## 2023-09-05 RX ADMIN — CARVEDILOL 6.25 MG: 6.25 TABLET, FILM COATED ORAL at 09:47

## 2023-09-05 RX ADMIN — CLONIDINE HYDROCHLORIDE 0.1 MG: 0.1 TABLET ORAL at 09:47

## 2023-09-05 ASSESSMENT — PAIN DESCRIPTION - FREQUENCY
FREQUENCY: CONTINUOUS
FREQUENCY: CONTINUOUS

## 2023-09-05 ASSESSMENT — PAIN DESCRIPTION - DESCRIPTORS
DESCRIPTORS: ACHING;SORE

## 2023-09-05 ASSESSMENT — PAIN - FUNCTIONAL ASSESSMENT: PAIN_FUNCTIONAL_ASSESSMENT: PREVENTS OR INTERFERES SOME ACTIVE ACTIVITIES AND ADLS

## 2023-09-05 ASSESSMENT — PAIN SCALES - GENERAL
PAINLEVEL_OUTOF10: 5
PAINLEVEL_OUTOF10: 5
PAINLEVEL_OUTOF10: 0
PAINLEVEL_OUTOF10: 0

## 2023-09-05 ASSESSMENT — PAIN DESCRIPTION - LOCATION
LOCATION: NECK

## 2023-09-05 ASSESSMENT — PAIN DESCRIPTION - PAIN TYPE: TYPE: ACUTE PAIN

## 2023-09-05 ASSESSMENT — PAIN DESCRIPTION - ORIENTATION: ORIENTATION: LEFT

## 2023-09-05 ASSESSMENT — PAIN DESCRIPTION - ONSET
ONSET: ON-GOING
ONSET: ON-GOING

## 2023-09-05 NOTE — CARE COORDINATION
[facility]  Patient is active with [facility] for nurse and PT. Will follow for discharge to home with orders to resume care.       Shruthi Pacheco RN, BSN CTN  [facility] [phone]

## 2023-09-05 NOTE — PLAN OF CARE
Problem: Discharge Planning  Goal: Discharge to home or other facility with appropriate resources  Outcome: Progressing  Flowsheets (Taken 9/4/2023 2130)  Discharge to home or other facility with appropriate resources: Identify barriers to discharge with patient and caregiver     Problem: Safety - Adult  Goal: Free from fall injury  Outcome: Progressing     Problem: ABCDS Injury Assessment  Goal: Absence of physical injury  Outcome: Progressing     Problem: Skin/Tissue Integrity  Goal: Absence of new skin breakdown  Description: 1. Monitor for areas of redness and/or skin breakdown  2. Assess vascular access sites hourly  3. Every 4-6 hours minimum:  Change oxygen saturation probe site  4. Every 4-6 hours:  If on nasal continuous positive airway pressure, respiratory therapy assess nares and determine need for appliance change or resting period.   Outcome: Progressing     Problem: Respiratory - Adult  Goal: Achieves optimal ventilation and oxygenation  Outcome: Progressing  Flowsheets (Taken 9/4/2023 2130)  Achieves optimal ventilation and oxygenation:   Assess for changes in respiratory status   Assess for changes in mentation and behavior   Position to facilitate oxygenation and minimize respiratory effort     Problem: Cardiovascular - Adult  Goal: Maintains optimal cardiac output and hemodynamic stability  Outcome: Progressing  Flowsheets (Taken 9/4/2023 2130)  Maintains optimal cardiac output and hemodynamic stability:   Monitor blood pressure and heart rate   Monitor urine output and notify Licensed Independent Practitioner for values outside of normal range  Goal: Absence of cardiac dysrhythmias or at baseline  Outcome: Progressing  Flowsheets (Taken 9/4/2023 2130)  Absence of cardiac dysrhythmias or at baseline: Monitor cardiac rate and rhythm     Problem: Skin/Tissue Integrity - Adult  Goal: Skin integrity remains intact  Outcome: Progressing  Flowsheets (Taken 9/4/2023 2130)  Skin Integrity Remains for signs and symptoms of electrolyte imbalances  Goal: Hemodynamic stability and optimal renal function maintained  Outcome: Progressing  Flowsheets (Taken 9/4/2023 2130)  Hemodynamic stability and optimal renal function maintained: Monitor labs and assess for signs and symptoms of volume excess or deficit  Goal: Glucose maintained within prescribed range  Outcome: Progressing  Flowsheets (Taken 9/4/2023 2130)  Glucose maintained within prescribed range: Monitor blood glucose as ordered     Problem: Pain  Goal: Verbalizes/displays adequate comfort level or baseline comfort level  Outcome: Progressing     Problem: Neurosensory - Adult  Goal: Achieves stable or improved neurological status  Outcome: Progressing  Flowsheets (Taken 9/4/2023 2130)  Achieves stable or improved neurological status: Assess for and report changes in neurological status  Goal: Achieves maximal functionality and self care  Outcome: Progressing  Flowsheets (Taken 9/4/2023 2130)  Achieves maximal functionality and self care: Monitor swallowing and airway patency with patient fatigue and changes in neurological status Cephalexin Pregnancy And Lactation Text: This medication is Pregnancy Category B and considered safe during pregnancy.  It is also excreted in breast milk but can be used safely for shorter doses.

## 2023-09-05 NOTE — CONSULTS
PALLIATIVE MEDICINE CONSULTATION     Patient name:Janae Quan   MFV:[id number]    :1945  Room/Bed:F7H-7122/5122-01   LOS: 4 days         Date of consult:2023    Consult Information  Palliative Medicine Consult performed by: CHRISTINE Newberry CNP    Inpatient consult to Palliative Care  Consult performed by: CHRISTINE Newberry CNP  Consult ordered by: Anita Bell MD  Reason for consult: [address] [place], family support, symtpom management. ASSESSMENT/RECOMMENDATIONS     69 y.o. female with worsening hypoxia, combined heart failure, COPD, MARK on CKD, and swelling of the neck. Overall prognosis is poor, patient becoming more debilitated. Symptom Management:  Acute on chronic hypoxic respiratory failure -currently on oxygen per nasal cannula at 4 L. BiPAP at night and as needed throughout the day. Pulmonary following. Exacerbation of heart failure -grade 3 diastolic. Cardiology following. Patient has diuresed approximately 10 pounds. Diuretics on hold due to MARK. MARK on CKD -nephrology following, diuretics on hold. Parotitis -continue IV antibiotics as directed. Ultrasound of the neck and CT of the neck ordered. ENT consulted. Goals of Care - See below. Patient/Family Goals of Care :    Goals of care conversation at the bedside with the patient has been, patient is more lethargic, unable to participate at this time. We reviewed that the patient's care is becoming more difficult at home for the patient's , the plan is for her to remain at home until the time of her death. They are not open to nursing home placement for any reason. Discussed the patient's overall health and prognosis. Hospice education was provided to the patient/family. Hospice is a patient-centered, family-focused approach to end of life for those with a life expectancy of 6 months or less if their disease takes its natural/usual course.  Hospice focuses on pain and symptom

## 2023-09-05 NOTE — DISCHARGE INSTRUCTIONS
Extra Heart Failure sites:     https://SquareMarket. com/publication/?x=286477   --- this is American Heart Association interactive Healthier Living with Heart Failure guidebook. Please click hyperlink or copy / paste link into search bar. Use your mouse to scroll through the pages. Lots of information about weight monitoring, diet tips, activity, meds, etc    HF [place] maxwell  -- this is a free smart phone maxwell available for iPhone and Android download. Use your phone to track sodium / fluid intake, zone tool symptom tracking, weights, medications, etc. Click on this hyperlink  HF [place] Maxwell   for QR code for easy download. DASH (Dietary Approach to Stop Hypertension) diet --  SeekAlumni.no -- this diet is a flexible eating plan that promotes heart healthy eating style. Click on hyperlink or copy / paste link into search bar. Lots of low sodium recipes and tips.     CigarRepair.ca  -- more free recipes

## 2023-09-05 NOTE — PROGRESS NOTES
Patient refusing the use of overnight HHN's and BiPAP x2. Patient stating it's due to pain and swelling on the left side of her face.   Electronically signed by Jaida Rashid RCP on 9/5/2023 at 4:41 AM

## 2023-09-05 NOTE — CARE COORDINATION
DISCHARGE PLANNING:    Per chart review and IDR rounds, patient requiring O2 and a NIV at discharge. Also watching patient for possible IV atb therapy at dc. Will need DME orders. DC plan remains home with active services. [facility] is [facility] for [facility] and [facility].       #[phone]  Electronically signed by Jennifer Caraballo RN on 9/5/2023 at 12:19 PM

## 2023-09-05 NOTE — CONSULTS
Consult received. Pt and her  well known to heart failure RNs. Will continue to follow along, but will defer formal consult at this time. We have met many times, and they are both knowledgeable about Janae's many health problems, including her CHF.

## 2023-09-05 NOTE — PROGRESS NOTES
result with BALWINDER 3 flow   Findings:     Left Main  Distal non obstructive 30% disease, evaluated by IVUS MLA > 7.5 mm2   LAD  Moderate diffuse disease   Circ  Proximal vessel 75-80% stenosis correlating with positive functional study   Mid vessel non-obstructive disease 50%   RCA   with L-R collaterals         Interventions/Vessels  PCI performed to the proximal Lcx with DESx2   Resolute Mckay 3.5x22 mm   Resolute Stockwell 3. 5x8 mm   Excellent post angiographic result with BALWINDER 3 flow   Guides/Wires  XB 3.5 guide catheter   Terumo RunThrough   Balanced Heavy Weight   Devices  Opticross boston IVUS   Post % Stenosis  0   Closure Device  Perclose R CFA   Complications  None      Conclusion:   Successful PCI of proximal Lcx with DESx2  Plavix loaded in cath lab  Femoral artery perclosed with excellent hemostasis  3H bedrest  Home this evening, follow up in 1-2 weeks     ECHO 11/3/2022  Summary  Borderline conc. LVH with normal wall motion; EF ~70%. Grade II diastolic dysfunction with elevated LH filling pressures. The left atrium is severely dilated. The right ventricle is normal in size and function. Mild mitral, aortic and tricuspid regurgitation. Impression   Mild pulmonary vascular congestion. Stable mild cardiomegaly. RHC 11/4/2022  RA 5  RV 53/9  PA 49/20 (28)  PCWP25  CI 3.8     ECHO 8/3/2023  Summary  Left ventricular cavity size is normal. There is mild concentric left ventricular hypertrophy  Overall left ventricular systolic function appears normal with an ejection fraction of 55-60%. Diastolic filling parameters suggest grade III diastolic dysfunction. The left atrium is mildly dilated. Estimated pulmonary artery systolic pressure is moderately elevated at 51 mmHg assuming a right atrial pressure of 15 mmHg. CXR:  FINDINGS:  Normal cardiomediastinal silhouette. No acute airspace infiltrate.   No  pneumothorax or pleural effusion     IMPRESSION:  No acute cardiopulmonary by CHRISTINE Ortega - CNP on 9/5/2023 at 10:19 AM

## 2023-09-05 NOTE — PROGRESS NOTES
2330 patients urine became bright red and clear and had visible blood on purewick. Purewick changed. Patient is also complaining of pain in left jaw, area is swollen. [address], NP via secure message. She is aware. Awaiting response. 2350 see new orders. 0009 patient bp was 195/83, automatically and manually. NP made aware. No knew orders at this time. 7610 Patients manual /104 and urine still red in color. 1125 Rhona orders placed for BP , no new orders for urine. Patient is resting in bed with eyes closed, breathing unlabored and even. No distress noted upon exam. Plan of care ongoing. Truman Garcia RN.

## 2023-09-05 NOTE — DISCHARGE INSTR - COC
Continuity of Care Form    Patient Name: Carole Valerio   :  1945  MRN:  [id number]    [address] date:  2023  Discharge date:  23    Code Status Order: DNR-CCA   Advance Directives:     Admitting Physician:  Shaq Wang MD  PCP: Todd Munguia MD    Discharging Nurse: [facility] Unit/Room#: S8V-9699/5122-01  Discharging Unit Phone Number: [phone]    Emergency Contact:   Extended Emergency Contact Information  Primary Emergency Contact: Linda Perez HUAN  Address: [address] [place] [address] [place] [facility] [place] of [address] [place] Phone: [phone]  Relation: Spouse  Secondary Emergency Contact: Parth Ervin of [address] [place] Phone: [phone]  Mobile Phone: [phone]  Relation: Child    Past Surgical History:  Past Surgical History:   Procedure Laterality Date    ABDOMINAL AORTIC ANEURYSM REPAIR      Endovascular abdominal AA    APPENDECTOMY      incidental    BLADDER SUSPENSION      CAPSULE ENDOSCOPY N/A 2023    ESOPHAGEAL CAPSULE ENDOSCOPY performed by Anuel Dawn MD at [facility] Right 2022    Cardiomems PA sensor device implant Left PA    [id number] [facility] [place]  10/15/2013    COLECTOMY N/A 2023    LAPAROSCOPIC ASSISTED LYSIS OF ADHESIONS AND PUSH ENTEROSCOPY performed by Elliott Baird MD at [address] [place] [address]  2007    dr Edgar Fierro and check in 5 years. COLONOSCOPY  2017    ok dr arndt, repeat 5 years    COLONOSCOPY N/A 5/10/2023    COLONOSCOPY performed by Tere Rojas MD at [address] [place] [address] ([address] [place] [address])      for benign tumor.  just the uterus    JOINT REPLACEMENT  2013    right knee replacement    TONSILLECTOMY  as a child    TUMOR EXCISION      benign behind right ear about     UPPER GASTROINTESTINAL ENDOSCOPY N/A 2023    EGD CONTROL HEMORRHAGE WITH APC TO AVM performed by Cony Donohue

## 2023-09-06 LAB
ANION GAP SERPL CALCULATED.3IONS-SCNC: 10 MMOL/L (ref 3–16)
BASOPHILS # BLD: 0 K/UL (ref 0–0.2)
BASOPHILS # BLD: 0.1 K/UL (ref 0–0.2)
BASOPHILS NFR BLD: 0.1 %
BASOPHILS NFR BLD: 0.4 %
BUN SERPL-MCNC: 52 MG/DL (ref 7–20)
CALCIUM SERPL-MCNC: 8.7 MG/DL (ref 8.3–10.6)
CHLORIDE SERPL-SCNC: 93 MMOL/L (ref 99–110)
CO2 SERPL-SCNC: 34 MMOL/L (ref 21–32)
CREAT SERPL-MCNC: 2.2 MG/DL (ref 0.6–1.2)
DEPRECATED RDW RBC AUTO: 17.9 % (ref 12.4–15.4)
DEPRECATED RDW RBC AUTO: 18.1 % (ref 12.4–15.4)
EOSINOPHIL # BLD: 0 K/UL (ref 0–0.6)
EOSINOPHIL # BLD: 0.3 K/UL (ref 0–0.6)
EOSINOPHIL NFR BLD: 0.2 %
EOSINOPHIL NFR BLD: 1.7 %
GFR SERPLBLD CREATININE-BSD FMLA CKD-EPI: 22 ML/MIN/{1.73_M2}
GLUCOSE SERPL-MCNC: 125 MG/DL (ref 70–99)
HCT VFR BLD AUTO: 29.7 % (ref 36–48)
HCT VFR BLD AUTO: 29.7 % (ref 36–48)
HGB BLD-MCNC: 9.5 G/DL (ref 12–16)
HGB BLD-MCNC: 9.6 G/DL (ref 12–16)
LYMPHOCYTES # BLD: 0.8 K/UL (ref 1–5.1)
LYMPHOCYTES # BLD: 0.9 K/UL (ref 1–5.1)
LYMPHOCYTES NFR BLD: 3.1 %
LYMPHOCYTES NFR BLD: 4.6 %
MAGNESIUM SERPL-MCNC: 2.1 MG/DL (ref 1.8–2.4)
MCH RBC QN AUTO: 28.4 PG (ref 26–34)
MCH RBC QN AUTO: 28.5 PG (ref 26–34)
MCHC RBC AUTO-ENTMCNC: 31.9 G/DL (ref 31–36)
MCHC RBC AUTO-ENTMCNC: 32.5 G/DL (ref 31–36)
MCV RBC AUTO: 87.8 FL (ref 80–100)
MCV RBC AUTO: 89 FL (ref 80–100)
MONOCYTES # BLD: 1.3 K/UL (ref 0–1.3)
MONOCYTES # BLD: 1.4 K/UL (ref 0–1.3)
MONOCYTES NFR BLD: 5.8 %
MONOCYTES NFR BLD: 6.6 %
NEUTROPHILS # BLD: 17.5 K/UL (ref 1.7–7.7)
NEUTROPHILS # BLD: 21.7 K/UL (ref 1.7–7.7)
NEUTROPHILS NFR BLD: 87 %
NEUTROPHILS NFR BLD: 90.5 %
PLATELET # BLD AUTO: 237 K/UL (ref 135–450)
PLATELET # BLD AUTO: 262 K/UL (ref 135–450)
PMV BLD AUTO: 10.2 FL (ref 5–10.5)
PMV BLD AUTO: 9.5 FL (ref 5–10.5)
POTASSIUM SERPL-SCNC: 3.6 MMOL/L (ref 3.5–5.1)
RBC # BLD AUTO: 3.34 M/UL (ref 4–5.2)
RBC # BLD AUTO: 3.38 M/UL (ref 4–5.2)
SODIUM SERPL-SCNC: 137 MMOL/L (ref 136–145)
VANCOMYCIN SERPL-MCNC: 14.2 UG/ML
WBC # BLD AUTO: 20.2 K/UL (ref 4–11)
WBC # BLD AUTO: 24 K/UL (ref 4–11)

## 2023-09-06 PROCEDURE — 80048 BASIC METABOLIC PNL TOTAL CA: CPT

## 2023-09-06 PROCEDURE — 6370000000 HC RX 637 (ALT 250 FOR IP): Performed by: HOSPITALIST

## 2023-09-06 PROCEDURE — 87641 MR-STAPH DNA AMP PROBE: CPT

## 2023-09-06 PROCEDURE — 94761 N-INVAS EAR/PLS OXIMETRY MLT: CPT

## 2023-09-06 PROCEDURE — 2700000000 HC OXYGEN THERAPY PER DAY

## 2023-09-06 PROCEDURE — 99223 1ST HOSP IP/OBS HIGH 75: CPT | Performed by: INTERNAL MEDICINE

## 2023-09-06 PROCEDURE — 6370000000 HC RX 637 (ALT 250 FOR IP): Performed by: INTERNAL MEDICINE

## 2023-09-06 PROCEDURE — 2580000003 HC RX 258: Performed by: HOSPITALIST

## 2023-09-06 PROCEDURE — 2060000000 HC ICU INTERMEDIATE R&B

## 2023-09-06 PROCEDURE — 6370000000 HC RX 637 (ALT 250 FOR IP)

## 2023-09-06 PROCEDURE — 99232 SBSQ HOSP IP/OBS MODERATE 35: CPT | Performed by: STUDENT IN AN ORGANIZED HEALTH CARE EDUCATION/TRAINING PROGRAM

## 2023-09-06 PROCEDURE — 99232 SBSQ HOSP IP/OBS MODERATE 35: CPT | Performed by: INTERNAL MEDICINE

## 2023-09-06 PROCEDURE — 83735 ASSAY OF MAGNESIUM: CPT

## 2023-09-06 PROCEDURE — 2580000003 HC RX 258: Performed by: INTERNAL MEDICINE

## 2023-09-06 PROCEDURE — 94640 AIRWAY INHALATION TREATMENT: CPT

## 2023-09-06 PROCEDURE — 6360000002 HC RX W HCPCS: Performed by: INTERNAL MEDICINE

## 2023-09-06 PROCEDURE — 6370000000 HC RX 637 (ALT 250 FOR IP): Performed by: NURSE PRACTITIONER

## 2023-09-06 PROCEDURE — 85025 COMPLETE CBC W/AUTO DIFF WBC: CPT

## 2023-09-06 PROCEDURE — 80202 ASSAY OF VANCOMYCIN: CPT

## 2023-09-06 PROCEDURE — 36415 COLL VENOUS BLD VENIPUNCTURE: CPT

## 2023-09-06 RX ORDER — VANCOMYCIN 1.75 G/350ML
1250 INJECTION, SOLUTION INTRAVENOUS ONCE
Status: COMPLETED | OUTPATIENT
Start: 2023-09-06 | End: 2023-09-06

## 2023-09-06 RX ORDER — IPRATROPIUM BROMIDE AND ALBUTEROL SULFATE 2.5; .5 MG/3ML; MG/3ML
SOLUTION RESPIRATORY (INHALATION)
Status: COMPLETED
Start: 2023-09-06 | End: 2023-09-06

## 2023-09-06 RX ORDER — DOFETILIDE 0.12 MG/1
125 CAPSULE ORAL EVERY 12 HOURS SCHEDULED
Status: DISCONTINUED | OUTPATIENT
Start: 2023-09-06 | End: 2023-09-08

## 2023-09-06 RX ORDER — ROSUVASTATIN CALCIUM 10 MG/1
10 TABLET, COATED ORAL DAILY
Status: DISCONTINUED | OUTPATIENT
Start: 2023-09-06 | End: 2023-09-17 | Stop reason: HOSPADM

## 2023-09-06 RX ADMIN — MOMETASONE FUROATE AND FORMOTEROL FUMARATE DIHYDRATE 2 PUFF: 200; 5 AEROSOL RESPIRATORY (INHALATION) at 20:12

## 2023-09-06 RX ADMIN — SODIUM CHLORIDE, PRESERVATIVE FREE 10 ML: 5 INJECTION INTRAVENOUS at 21:02

## 2023-09-06 RX ADMIN — CARVEDILOL 6.25 MG: 6.25 TABLET, FILM COATED ORAL at 17:43

## 2023-09-06 RX ADMIN — SODIUM CHLORIDE, PRESERVATIVE FREE 10 ML: 5 INJECTION INTRAVENOUS at 09:56

## 2023-09-06 RX ADMIN — TRAMADOL HYDROCHLORIDE 50 MG: 50 TABLET ORAL at 02:08

## 2023-09-06 RX ADMIN — RANOLAZINE 500 MG: 500 TABLET, FILM COATED, EXTENDED RELEASE ORAL at 09:55

## 2023-09-06 RX ADMIN — IPRATROPIUM BROMIDE AND ALBUTEROL SULFATE 1 DOSE: .5; 3 SOLUTION RESPIRATORY (INHALATION) at 11:53

## 2023-09-06 RX ADMIN — DOFETILIDE 125 MCG: 0.12 CAPSULE ORAL at 21:00

## 2023-09-06 RX ADMIN — ACETAZOLAMIDE 125 MG: 250 TABLET ORAL at 09:55

## 2023-09-06 RX ADMIN — FERROUS SULFATE TAB EC 324 MG (65 MG FE EQUIVALENT) 324 MG: 324 (65 FE) TABLET DELAYED RESPONSE at 17:44

## 2023-09-06 RX ADMIN — RIVAROXABAN 15 MG: 15 TABLET, FILM COATED ORAL at 13:04

## 2023-09-06 RX ADMIN — ROSUVASTATIN CALCIUM 10 MG: 10 TABLET, FILM COATED ORAL at 17:21

## 2023-09-06 RX ADMIN — CEFEPIME 1000 MG: 1 INJECTION, POWDER, FOR SOLUTION INTRAMUSCULAR; INTRAVENOUS at 13:08

## 2023-09-06 RX ADMIN — ACETAZOLAMIDE 125 MG: 250 TABLET ORAL at 21:00

## 2023-09-06 RX ADMIN — IPRATROPIUM BROMIDE AND ALBUTEROL SULFATE 1 DOSE: 2.5; .5 SOLUTION RESPIRATORY (INHALATION) at 08:13

## 2023-09-06 RX ADMIN — IPRATROPIUM BROMIDE AND ALBUTEROL SULFATE 1 DOSE: 2.5; .5 SOLUTION RESPIRATORY (INHALATION) at 20:12

## 2023-09-06 RX ADMIN — VANCOMYCIN 1250 MG: 1.75 INJECTION, SOLUTION INTRAVENOUS at 11:26

## 2023-09-06 RX ADMIN — IPRATROPIUM BROMIDE AND ALBUTEROL SULFATE 1 DOSE: 2.5; .5 SOLUTION RESPIRATORY (INHALATION) at 16:31

## 2023-09-06 RX ADMIN — METHYLPREDNISOLONE SODIUM SUCCINATE 40 MG: 40 INJECTION, POWDER, FOR SOLUTION INTRAMUSCULAR; INTRAVENOUS at 09:54

## 2023-09-06 RX ADMIN — DILTIAZEM HYDROCHLORIDE 120 MG: 120 CAPSULE, COATED, EXTENDED RELEASE ORAL at 09:55

## 2023-09-06 RX ADMIN — DOFETILIDE 250 MCG: 0.25 CAPSULE ORAL at 09:55

## 2023-09-06 RX ADMIN — CLONIDINE HYDROCHLORIDE 0.1 MG: 0.1 TABLET ORAL at 02:06

## 2023-09-06 RX ADMIN — MOMETASONE FUROATE AND FORMOTEROL FUMARATE DIHYDRATE 2 PUFF: 200; 5 AEROSOL RESPIRATORY (INHALATION) at 08:13

## 2023-09-06 RX ADMIN — HYDRALAZINE HYDROCHLORIDE 50 MG: 50 TABLET, FILM COATED ORAL at 05:29

## 2023-09-06 RX ADMIN — IPRATROPIUM BROMIDE AND ALBUTEROL SULFATE 1 DOSE: 2.5; .5 SOLUTION RESPIRATORY (INHALATION) at 11:53

## 2023-09-06 RX ADMIN — POTASSIUM CHLORIDE 40 MEQ: 1500 TABLET, EXTENDED RELEASE ORAL at 09:55

## 2023-09-06 RX ADMIN — EMPAGLIFLOZIN 10 MG: 10 TABLET, FILM COATED ORAL at 09:54

## 2023-09-06 RX ADMIN — PANTOPRAZOLE SODIUM 40 MG: 40 TABLET, DELAYED RELEASE ORAL at 05:29

## 2023-09-06 RX ADMIN — OXYCODONE HYDROCHLORIDE AND ACETAMINOPHEN 500 MG: 500 TABLET ORAL at 09:55

## 2023-09-06 RX ADMIN — ISOSORBIDE MONONITRATE 120 MG: 60 TABLET, EXTENDED RELEASE ORAL at 13:05

## 2023-09-06 RX ADMIN — FERROUS SULFATE TAB EC 324 MG (65 MG FE EQUIVALENT) 324 MG: 324 (65 FE) TABLET DELAYED RESPONSE at 09:55

## 2023-09-06 RX ADMIN — RANOLAZINE 500 MG: 500 TABLET, FILM COATED, EXTENDED RELEASE ORAL at 21:00

## 2023-09-06 RX ADMIN — CEFEPIME 1000 MG: 1 INJECTION, POWDER, FOR SOLUTION INTRAMUSCULAR; INTRAVENOUS at 02:14

## 2023-09-06 ASSESSMENT — PAIN DESCRIPTION - LOCATION: LOCATION: NECK

## 2023-09-06 ASSESSMENT — PAIN DESCRIPTION - DESCRIPTORS: DESCRIPTORS: ACHING;SORE;DISCOMFORT

## 2023-09-06 ASSESSMENT — PAIN SCALES - WONG BAKER: WONGBAKER_NUMERICALRESPONSE: 0

## 2023-09-06 ASSESSMENT — PAIN SCALES - GENERAL
PAINLEVEL_OUTOF10: 0
PAINLEVEL_OUTOF10: 6
PAINLEVEL_OUTOF10: 0
PAINLEVEL_OUTOF10: 0

## 2023-09-06 ASSESSMENT — PAIN DESCRIPTION - ORIENTATION: ORIENTATION: LEFT

## 2023-09-06 NOTE — PROGRESS NOTES
[facility]   Daily Progress Note      Admit Date:  9/1/2023    CC: SOB    HPI:   Catalino Pretty is a 69 y.o. female with PMH  CAD, AF s/p PVI ablation 10/2020- recurrent AF with DCCV 1/2021 and repeat AF ablation 2/2021 (failed flecaininde), HTN, aortic aneurysm, AAA s/p repair 3/2018 (Dr. Begum Din, DENISE (intolerant to CPAP). Carol Dk stress>LHC 3/2018 revealed  of RCA and non obstructive dz of LAD and LCX. Adm to [facility] 5/2022 for dofetilide loading, converted to NSR. Abn Stress followed by [facility] 9/20/2022 showed CAD. Multiple HF/hypoxic resp failure hospitalizations/ re admissions over last year. S/P Cardiomems PA sensor implant 11/2022. Non complaint with readings. GIB 4/2023 & 5/2023 requiring multiple units of PRBC>EGD with gastric AVMs. Admitted 6/25-7/2/2023 AMS, found to have UTI. Mentation improved with treatment of UTI. Cardiology consulted and hypervolemic. IV Lasix was started with improvement in SOB, bloating and edema. Orthopnea. 4L NC     Presented to Dr. Michelle Phan office with SOB and edema. Sent to ED for decompensated HF. Initial Cardiomems PAD elevated 35. Started on Lasix IV and metolazone- diuresing. Now with Left neck swelling and pain. She is lethargic but arouses and says he r breathing and swelling slightly improved. On 4L NC.  BP has been elevated. Denies CP, LH, dizziness, palpitations, syncope    Review of Systems:   General: Denies fever, chills  Skin: Denies skin changes, rash, itching, lesions.   HEENT: Denies headache, dizziness, vision changes, nosebleeds, sore throat, nasal drainage  RESP: Denies cough, sputum,  wheeze, snoring  CARD: Denies palpitations,  murmur  GI:Denies nausea, vomiting, heartburn, loss of appetite, change in bowels  : Denies frequency, pain, incontinence, polyuria  VASC: Denies claudication, leg cramps, clots  MUSC/SKEL: Denies pain, stiffness, arthritis  PSYCH: Denies anxiety, depression, stress  NEURO: Denies numbness, tingling,

## 2023-09-06 NOTE — CONSULTS
Infectious Diseases Inpatient Consult Note      Reason for Consult:  Acute left parotitis and WBC elevation     Requesting Physician:  Bárbara Medina      Primary Care Physician:  Rico Potter MD    History Obtained From:  Epic and pt     CHIEF COMPLAINT:     Chief Complaint   Patient presents with    Shortness of Breath     Pt. Was at Dr. Dolly Polk office for LIFESCAPE, sent to ED         HISTORY OF PRESENT ILLNESS:  69 y.o. woman with a significant history for abdominal aortic aneurysm, atrial fibrillation, coronary artery disease, congestive heart failure, COPD, hypertension. Status post endovascular repair of the aneurysm admitted to the  hospital secondary to worsening edema shortness of breath. She is currently using 4l  nasal cannula. In s  hospital developed a left Parotid gland swelling pain redness concern pancreatitis. She underwent CT soft tissue of the neck indicated Cellulitis no abscess formation. Labs indicate WBC 31.  Hb at  9.6,  Creat  2.5.,  We are consulted for IV abx  recommendations      Past Medical History:    Past Medical History:   Diagnosis Date    AAA (abdominal aortic aneurysm) ([address])     pt states it is 4cm    AAA (abdominal aortic aneurysm) without rupture (HCC) 2/10/2015    Atrial fibrillation (HCC)     CAD (coronary artery disease)     CHF (congestive heart failure) (HCC)     COPD (chronic obstructive pulmonary disease) ([address])     History of blood clots     Hyperlipidemia     Hypertension        Past Surgical History:    Past Surgical History:   Procedure Laterality Date    ABDOMINAL AORTIC ANEURYSM REPAIR      Endovascular abdominal AA    APPENDECTOMY  1990    incidental    BLADDER SUSPENSION      CAPSULE ENDOSCOPY N/A 5/22/2023    ESOPHAGEAL CAPSULE ENDOSCOPY performed by Riley Barney MD at [address] [place] [address] Right 11/28/2022    Cardiomems PA sensor device implant Left PA    [id number] [facility]

## 2023-09-06 NOTE — PLAN OF CARE
Problem: Discharge Planning  Goal: Discharge to home or other facility with appropriate resources  Outcome: Progressing  Flowsheets (Taken 9/5/2023 2231)  Discharge to home or other facility with appropriate resources: Identify barriers to discharge with patient and caregiver     Problem: Safety - Adult  Goal: Free from fall injury  Outcome: Progressing     Problem: ABCDS Injury Assessment  Goal: Absence of physical injury  Outcome: Progressing     Problem: Skin/Tissue Integrity  Goal: Absence of new skin breakdown  Description: 1. Monitor for areas of redness and/or skin breakdown  2. Assess vascular access sites hourly  3. Every 4-6 hours minimum:  Change oxygen saturation probe site  4. Every 4-6 hours:  If on nasal continuous positive airway pressure, respiratory therapy assess nares and determine need for appliance change or resting period.   Outcome: Progressing     Problem: Respiratory - Adult  Goal: Achieves optimal ventilation and oxygenation  Outcome: Progressing  Flowsheets (Taken 9/5/2023 2231)  Achieves optimal ventilation and oxygenation: Assess for changes in respiratory status     Problem: Cardiovascular - Adult  Goal: Maintains optimal cardiac output and hemodynamic stability  Outcome: Progressing  Flowsheets (Taken 9/5/2023 2231)  Maintains optimal cardiac output and hemodynamic stability: Monitor blood pressure and heart rate  Goal: Absence of cardiac dysrhythmias or at baseline  Outcome: Progressing  Flowsheets (Taken 9/5/2023 2231)  Absence of cardiac dysrhythmias or at baseline: Monitor cardiac rate and rhythm     Problem: Skin/Tissue Integrity - Adult  Goal: Skin integrity remains intact  Outcome: Progressing  Flowsheets (Taken 9/5/2023 2231)  Skin Integrity Remains Intact: Monitor for areas of redness and/or skin breakdown  Goal: Oral mucous membranes remain intact  Outcome: Progressing  Flowsheets (Taken 9/5/2023 2231)  Oral Mucous Membranes Remain Intact: Assess oral mucosa and hygiene range  Outcome: Progressing  Flowsheets (Taken 9/5/2023 2231)  Glucose maintained within prescribed range: Monitor blood glucose as ordered     Problem: Pain  Goal: Verbalizes/displays adequate comfort level or baseline comfort level  Outcome: Progressing  Flowsheets (Taken 9/5/2023 2130)  Verbalizes/displays adequate comfort level or baseline comfort level:   Encourage patient to monitor pain and request assistance   Assess pain using appropriate pain scale   Administer analgesics based on type and severity of pain and evaluate response   Implement non-pharmacological measures as appropriate and evaluate response     Problem: Neurosensory - Adult  Goal: Achieves stable or improved neurological status  Outcome: Progressing  Flowsheets (Taken 9/5/2023 2231)  Achieves stable or improved neurological status: Assess for and report changes in neurological status  Goal: Achieves maximal functionality and self care  Outcome: Progressing  Flowsheets (Taken 9/5/2023 2231)  Achieves maximal functionality and self care: Monitor swallowing and airway patency with patient fatigue and changes in neurological status

## 2023-09-07 PROBLEM — D72.9 NEUTROPHILIA: Status: ACTIVE | Noted: 2023-09-07

## 2023-09-07 PROBLEM — K11.21 ACUTE SUPPURATIVE PAROTITIS: Status: ACTIVE | Noted: 2023-09-07

## 2023-09-07 PROBLEM — N18.31 STAGE 3A CHRONIC KIDNEY DISEASE (HCC): Status: ACTIVE | Noted: 2023-09-07

## 2023-09-07 PROBLEM — E66.01 CLASS 3 SEVERE OBESITY DUE TO EXCESS CALORIES WITH SERIOUS COMORBIDITY AND BODY MASS INDEX (BMI) OF 40.0 TO 44.9 IN ADULT (HCC): Status: ACTIVE | Noted: 2023-09-07

## 2023-09-07 LAB
ANION GAP SERPL CALCULATED.3IONS-SCNC: 15 MMOL/L (ref 3–16)
BUN SERPL-MCNC: 57 MG/DL (ref 7–20)
CALCIUM SERPL-MCNC: 8.6 MG/DL (ref 8.3–10.6)
CHLORIDE SERPL-SCNC: 95 MMOL/L (ref 99–110)
CO2 SERPL-SCNC: 29 MMOL/L (ref 21–32)
CREAT SERPL-MCNC: 2.2 MG/DL (ref 0.6–1.2)
EKG ATRIAL RATE: 55 BPM
EKG DIAGNOSIS: NORMAL
EKG P AXIS: 66 DEGREES
EKG P-R INTERVAL: 206 MS
EKG Q-T INTERVAL: 458 MS
EKG QRS DURATION: 98 MS
EKG QTC CALCULATION (BAZETT): 438 MS
EKG R AXIS: -11 DEGREES
EKG T AXIS: 15 DEGREES
EKG VENTRICULAR RATE: 55 BPM
GFR SERPLBLD CREATININE-BSD FMLA CKD-EPI: 22 ML/MIN/{1.73_M2}
GLUCOSE SERPL-MCNC: 101 MG/DL (ref 70–99)
MRSA DNA SPEC QL NAA+PROBE: ABNORMAL
NT-PROBNP SERPL-MCNC: 5771 PG/ML (ref 0–449)
ORGANISM: ABNORMAL
POTASSIUM SERPL-SCNC: 3.7 MMOL/L (ref 3.5–5.1)
SODIUM SERPL-SCNC: 139 MMOL/L (ref 136–145)
VANCOMYCIN SERPL-MCNC: 16.4 UG/ML
VANCOMYCIN SERPL-MCNC: 20.3 UG/ML

## 2023-09-07 PROCEDURE — 94660 CPAP INITIATION&MGMT: CPT

## 2023-09-07 PROCEDURE — 2060000000 HC ICU INTERMEDIATE R&B

## 2023-09-07 PROCEDURE — 80048 BASIC METABOLIC PNL TOTAL CA: CPT

## 2023-09-07 PROCEDURE — 80202 ASSAY OF VANCOMYCIN: CPT

## 2023-09-07 PROCEDURE — 93005 ELECTROCARDIOGRAM TRACING: CPT | Performed by: NURSE PRACTITIONER

## 2023-09-07 PROCEDURE — 99232 SBSQ HOSP IP/OBS MODERATE 35: CPT | Performed by: INTERNAL MEDICINE

## 2023-09-07 PROCEDURE — 93010 ELECTROCARDIOGRAM REPORT: CPT | Performed by: INTERNAL MEDICINE

## 2023-09-07 PROCEDURE — 6360000002 HC RX W HCPCS: Performed by: INTERNAL MEDICINE

## 2023-09-07 PROCEDURE — 6360000002 HC RX W HCPCS: Performed by: NURSE PRACTITIONER

## 2023-09-07 PROCEDURE — 2580000003 HC RX 258: Performed by: INTERNAL MEDICINE

## 2023-09-07 PROCEDURE — 94640 AIRWAY INHALATION TREATMENT: CPT

## 2023-09-07 PROCEDURE — 94761 N-INVAS EAR/PLS OXIMETRY MLT: CPT

## 2023-09-07 PROCEDURE — 99232 SBSQ HOSP IP/OBS MODERATE 35: CPT | Performed by: NURSE PRACTITIONER

## 2023-09-07 PROCEDURE — 97530 THERAPEUTIC ACTIVITIES: CPT

## 2023-09-07 PROCEDURE — 97166 OT EVAL MOD COMPLEX 45 MIN: CPT

## 2023-09-07 PROCEDURE — 99233 SBSQ HOSP IP/OBS HIGH 50: CPT | Performed by: INTERNAL MEDICINE

## 2023-09-07 PROCEDURE — 36415 COLL VENOUS BLD VENIPUNCTURE: CPT

## 2023-09-07 PROCEDURE — 2700000000 HC OXYGEN THERAPY PER DAY

## 2023-09-07 PROCEDURE — 6370000000 HC RX 637 (ALT 250 FOR IP): Performed by: HOSPITALIST

## 2023-09-07 PROCEDURE — 6370000000 HC RX 637 (ALT 250 FOR IP): Performed by: NURSE PRACTITIONER

## 2023-09-07 PROCEDURE — 83880 ASSAY OF NATRIURETIC PEPTIDE: CPT

## 2023-09-07 PROCEDURE — 97162 PT EVAL MOD COMPLEX 30 MIN: CPT | Performed by: PHYSICAL THERAPIST

## 2023-09-07 PROCEDURE — 2580000003 HC RX 258: Performed by: HOSPITALIST

## 2023-09-07 PROCEDURE — 97530 THERAPEUTIC ACTIVITIES: CPT | Performed by: PHYSICAL THERAPIST

## 2023-09-07 PROCEDURE — 97116 GAIT TRAINING THERAPY: CPT | Performed by: PHYSICAL THERAPIST

## 2023-09-07 PROCEDURE — 6370000000 HC RX 637 (ALT 250 FOR IP): Performed by: INTERNAL MEDICINE

## 2023-09-07 RX ORDER — FUROSEMIDE 10 MG/ML
40 INJECTION INTRAMUSCULAR; INTRAVENOUS 2 TIMES DAILY
Status: DISCONTINUED | OUTPATIENT
Start: 2023-09-07 | End: 2023-09-08

## 2023-09-07 RX ORDER — CLINDAMYCIN PHOSPHATE 600 MG/50ML
600 INJECTION INTRAVENOUS EVERY 8 HOURS
Status: DISCONTINUED | OUTPATIENT
Start: 2023-09-07 | End: 2023-09-11

## 2023-09-07 RX ADMIN — CLINDAMYCIN IN 5 PERCENT DEXTROSE 600 MG: 12 INJECTION, SOLUTION INTRAVENOUS at 00:57

## 2023-09-07 RX ADMIN — IPRATROPIUM BROMIDE AND ALBUTEROL SULFATE 1 DOSE: 2.5; .5 SOLUTION RESPIRATORY (INHALATION) at 00:11

## 2023-09-07 RX ADMIN — EMPAGLIFLOZIN 10 MG: 10 TABLET, FILM COATED ORAL at 09:57

## 2023-09-07 RX ADMIN — MOMETASONE FUROATE AND FORMOTEROL FUMARATE DIHYDRATE 2 PUFF: 200; 5 AEROSOL RESPIRATORY (INHALATION) at 20:06

## 2023-09-07 RX ADMIN — DOFETILIDE 125 MCG: 0.12 CAPSULE ORAL at 10:17

## 2023-09-07 RX ADMIN — IPRATROPIUM BROMIDE AND ALBUTEROL SULFATE 1 DOSE: 2.5; .5 SOLUTION RESPIRATORY (INHALATION) at 11:55

## 2023-09-07 RX ADMIN — SODIUM CHLORIDE, PRESERVATIVE FREE 10 ML: 5 INJECTION INTRAVENOUS at 20:23

## 2023-09-07 RX ADMIN — IPRATROPIUM BROMIDE AND ALBUTEROL SULFATE 1 DOSE: 2.5; .5 SOLUTION RESPIRATORY (INHALATION) at 20:06

## 2023-09-07 RX ADMIN — FERROUS SULFATE TAB EC 324 MG (65 MG FE EQUIVALENT) 324 MG: 324 (65 FE) TABLET DELAYED RESPONSE at 18:11

## 2023-09-07 RX ADMIN — IPRATROPIUM BROMIDE AND ALBUTEROL SULFATE 1 DOSE: 2.5; .5 SOLUTION RESPIRATORY (INHALATION) at 07:43

## 2023-09-07 RX ADMIN — ROSUVASTATIN CALCIUM 10 MG: 10 TABLET, FILM COATED ORAL at 09:56

## 2023-09-07 RX ADMIN — METHYLPREDNISOLONE SODIUM SUCCINATE 40 MG: 40 INJECTION, POWDER, FOR SOLUTION INTRAMUSCULAR; INTRAVENOUS at 09:55

## 2023-09-07 RX ADMIN — FUROSEMIDE 40 MG: 10 INJECTION, SOLUTION INTRAMUSCULAR; INTRAVENOUS at 15:18

## 2023-09-07 RX ADMIN — FUROSEMIDE 40 MG: 10 INJECTION, SOLUTION INTRAMUSCULAR; INTRAVENOUS at 20:24

## 2023-09-07 RX ADMIN — CARVEDILOL 6.25 MG: 6.25 TABLET, FILM COATED ORAL at 18:11

## 2023-09-07 RX ADMIN — OXYCODONE HYDROCHLORIDE AND ACETAMINOPHEN 500 MG: 500 TABLET ORAL at 15:35

## 2023-09-07 RX ADMIN — DOFETILIDE 125 MCG: 0.12 CAPSULE ORAL at 20:23

## 2023-09-07 RX ADMIN — DILTIAZEM HYDROCHLORIDE 120 MG: 120 CAPSULE, COATED, EXTENDED RELEASE ORAL at 09:57

## 2023-09-07 RX ADMIN — CARVEDILOL 6.25 MG: 6.25 TABLET, FILM COATED ORAL at 09:57

## 2023-09-07 RX ADMIN — ISOSORBIDE MONONITRATE 120 MG: 60 TABLET, EXTENDED RELEASE ORAL at 09:57

## 2023-09-07 RX ADMIN — VANCOMYCIN HYDROCHLORIDE 750 MG: 750 INJECTION, POWDER, LYOPHILIZED, FOR SOLUTION INTRAVENOUS at 21:42

## 2023-09-07 RX ADMIN — RANOLAZINE 500 MG: 500 TABLET, FILM COATED, EXTENDED RELEASE ORAL at 09:58

## 2023-09-07 RX ADMIN — POTASSIUM CHLORIDE 40 MEQ: 1500 TABLET, EXTENDED RELEASE ORAL at 09:56

## 2023-09-07 RX ADMIN — RANOLAZINE 500 MG: 500 TABLET, FILM COATED, EXTENDED RELEASE ORAL at 20:23

## 2023-09-07 RX ADMIN — RIVAROXABAN 15 MG: 15 TABLET, FILM COATED ORAL at 09:57

## 2023-09-07 RX ADMIN — IPRATROPIUM BROMIDE AND ALBUTEROL SULFATE 1 DOSE: 2.5; .5 SOLUTION RESPIRATORY (INHALATION) at 15:41

## 2023-09-07 RX ADMIN — PANTOPRAZOLE SODIUM 40 MG: 40 TABLET, DELAYED RELEASE ORAL at 06:17

## 2023-09-07 RX ADMIN — CLINDAMYCIN IN 5 PERCENT DEXTROSE 600 MG: 12 INJECTION, SOLUTION INTRAVENOUS at 14:43

## 2023-09-07 RX ADMIN — FERROUS SULFATE TAB EC 324 MG (65 MG FE EQUIVALENT) 324 MG: 324 (65 FE) TABLET DELAYED RESPONSE at 09:57

## 2023-09-07 RX ADMIN — MOMETASONE FUROATE AND FORMOTEROL FUMARATE DIHYDRATE 2 PUFF: 200; 5 AEROSOL RESPIRATORY (INHALATION) at 07:42

## 2023-09-07 ASSESSMENT — ENCOUNTER SYMPTOMS
SHORTNESS OF BREATH: 0
GASTROINTESTINAL NEGATIVE: 1
COUGH: 0

## 2023-09-07 NOTE — PROGRESS NOTES
[address] [employer]   Daily Progress Note      Admit Date:  9/1/2023    CC: SOB    HPI:   Austin Roche is a 69 y.o. female with PMH  CAD, AF s/p PVI ablation 10/2020- recurrent AF with DCCV 1/2021 and repeat AF ablation 2/2021 (failed flecaininde), HTN, aortic aneurysm, AAA s/p repair 3/2018 (Dr. Bacilio Donohue, DENISE (intolerant to CPAP). Bettyann Rough stress>LHC 3/2018 revealed  of RCA and non obstructive dz of LAD and LCX. Adm to [facility] 5/2022 for dofetilide loading, converted to NSR. Abn Stress followed by [facility] 9/20/2022 showed CAD. Multiple HF/hypoxic resp failure hospitalizations/ re admissions over last year. S/P Cardiomems PA sensor implant 11/2022. Non complaint with readings. GIB 4/2023 & 5/2023 requiring multiple units of PRBC>EGD with gastric AVMs. Admitted 6/25-7/2/2023 AMS, found to have UTI. Mentation improved with treatment of UTI. Cardiology consulted and hypervolemic. IV Lasix was started with improvement in SOB, bloating and edema. Orthopnea. 4L NC     Presented to Dr. Ana Dejesus office with SOB and edema. Sent to ED for decompensated HF. Initial Cardiomems PAD elevated 35. Started on Lasix IV and metolazone- diuresing. Now with Left neck swelling and pain-CT scan revealed parotiditis- improving  She is lethargic but arouses and says he r breathing and swelling slightly improved. However, now on 4 L of nasal cannula with increased bilateral lower extremity swelling  BP has been elevated. Denies CP, LH, dizziness, palpitations, syncope    Review of Systems:   General: Denies fever, chills  Skin: Denies skin changes, rash, itching, lesions.   HEENT: Denies headache, dizziness, vision changes, nosebleeds, sore throat, nasal drainage  RESP: Denies cough, sputum,  wheeze, snoring  CARD: Denies palpitations,  murmur  GI:Denies nausea, vomiting, heartburn, loss of appetite, change in bowels  : Denies frequency, pain, incontinence, polyuria  VASC: Denies claudication, leg cramps,

## 2023-09-07 NOTE — PROGRESS NOTES
this consult done in this chart today 9/7/2023    Data Reviewed related to this consultation:    Review of prior note(s) from each unique source relevant to today's visit: Hospitalist, Case management, Cardiology, ENT. Unique test results reviewed: CBC and BNP. Time Spent:    Counseling and educating the patient/family/caregiver  Preparing to see the patient (e.g., review of tests)  Referring / communicating with other healthcare professionals including care coordination (not separately reported):  Hospitalist, Case management  Documenting clinical information in the electronic health record     Signed By: Electronically signed by CHRISTINE Collins CNP on 9/7/2023 at 11:12 AM   Palliative Medicine     September 7, 2023

## 2023-09-07 NOTE — PROGRESS NOTES
Occupational Therapy  Facility/Department: [facility]  Occupational Therapy Initial Assessment    Name: Brittany Burton  : 1945  MRN: [id number]  Date of Service: 2023    Discharge Recommendations:  24 hour supervision or assist  OT Equipment Recommendations  Equipment Needed: No     Brittany Burton scored a 15/24 on the AM-[facility] ADL Inpatient form. At this time, Pt would benefit from skilled therapy, however pt and spouse feel comfortable returning home at discharge. Patient Diagnosis(es): The primary encounter diagnosis was Non-STEMI (non-ST elevated myocardial infarction) ([address]). Diagnoses of Chronic renal failure, stage 3b ([address]) and Acute on chronic congestive heart failure, unspecified heart failure type [facility]) were also pertinent to this visit. Past Medical History:  has a past medical history of AAA (abdominal aortic aneurysm) ([address]), AAA (abdominal aortic aneurysm) without rupture (HCC), Atrial fibrillation ([address]), CAD (coronary artery disease), CHF (congestive heart failure) ([address]), COPD (chronic obstructive pulmonary disease) ([address]), History of blood clots, Hyperlipidemia, and Hypertension. Past Surgical History:  has a past surgical history that includes Colonoscopy (2007); Hysterectomy (); Appendectomy (); bladder suspension; Cataract removal; Tonsillectomy (as a child); tumor excision; Cholecystectomy (10/15/2013); Colonoscopy (2017); joint replacement (2013); Abdominal aortic aneurysm repair; Cardiac surgery; Cardiac catheterization (Right, 2022); Upper gastrointestinal endoscopy (N/A, 2023); Colonoscopy (N/A, 5/10/2023); Capsule endoscopy (N/A, 2023); Upper gastrointestinal endoscopy (N/A, 2023); and colectomy (N/A, 2023). Assessment   Performance deficits / Impairments: Decreased functional mobility ; Decreased safe awareness;Decreased balance;Decreased ADL status; Decreased endurance;Decreased high-level IADLs;Decreased RW and min A. Pt anxious with all activity. Pt stood again from chair briefly    Vision  Vision: Within Functional Limits  Hearing  Hearing: Within functional limits (slightly [place])    Cognition  Overall Cognitive Status: WFL  Cognition Comment: very anxious  Orientation  Overall Orientation Status: Within Functional Limits                  Education Given To: Patient  Education Provided: Role of Therapy;Plan of Care;Transfer Training  Education Method: Demonstration;Verbal  Barriers to Learning: None  Education Outcome: Verbalized understanding;Demonstrated understanding    LUE AROM (degrees)  LUE AROM : WFL  Left Hand AROM (degrees)  Left Hand AROM: WFL  RUE AROM (degrees)  RUE AROM : WFL  Right Hand AROM (degrees)  Right Hand AROM: [facility]      AM-PAC Score  AM-PAC Inpatient Daily Activity Raw Score: 15 (09/07/23 1545)  AM-PAC Inpatient ADL T-Scale Score : 34.69 (09/07/23 1545)  ADL Inpatient CMS 0-100% Score: 56.46 (09/07/23 1545)  ADL Inpatient CMS G-Code Modifier : CK (09/07/23 1545)    Goals  Short Term Goals  Time Frame for Short Term Goals: Prior to DC: Short Term Goal 1: Pt will complete ADL transfers/mobility with CGA  Short Term Goal 2: Pt will tolerate standing > 2 min for functional task with CGA  Short Term Goal 3: Pt will complete bed mobility with min A in prep for transfer  Short Term Goal 4: Pt will complete toileting with min A  Short Term Goal 5: Pt will complete UE exercises in all planes to inc strength/endurance for ADLs. Patient Goals   Patient goals : to return home       Therapy Time   Individual Concurrent Group Co-treatment   Time In 1340         Time Out 1420         Minutes 40         Timed Code Treatment Minutes: 25 Minutes     This note to serve as OT d/c summary if pt is d/c-ed prior to next therapy session.     Jeniffer Connell, OTR/L

## 2023-09-07 NOTE — PROGRESS NOTES
Infectious Diseases Inpatient Progress  Note        CHIEF COMPLAINT:     WBC elevation  Left acute parotitis  COPD  CHF  MARK on CKD  MRSA colonization              HISTORY OF PRESENT ILLNESS:  69 y.o. woman with a significant history for abdominal aortic aneurysm, atrial fibrillation, coronary artery disease, congestive heart failure, COPD, hypertension. Status post endovascular repair of the aneurysm admitted to the  hospital secondary to worsening edema shortness of breath. She is currently using 4l  nasal cannula. In [facility]  hospital developed a left Parotid gland swelling pain redness concern pancreatitis. She underwent CT soft tissue of the neck indicated Cellulitis no abscess formation. Labs indicate WBC 31.  Hb at  9.6,  Creat  2.5.,  We are consulted for IV abx  recommendations      Interval History : remains sob and using nasal cannula and Left parotid gland swelling on going and local  pain and redness+ , WBC remains elevated and creat at  2.2     Past Medical History:    Past Medical History:   Diagnosis Date    AAA (abdominal aortic aneurysm) ([address])     pt states it is 4cm    AAA (abdominal aortic aneurysm) without rupture ([address]) 2/10/2015    Atrial fibrillation (HCC)     CAD (coronary artery disease)     CHF (congestive heart failure) (HCC)     COPD (chronic obstructive pulmonary disease) ([address])     History of blood clots     Hyperlipidemia     Hypertension        Past Surgical History:    Past Surgical History:   Procedure Laterality Date    ABDOMINAL AORTIC ANEURYSM REPAIR      Endovascular abdominal AA    APPENDECTOMY  1990    incidental    BLADDER SUSPENSION      CAPSULE ENDOSCOPY N/A 5/22/2023    ESOPHAGEAL CAPSULE ENDOSCOPY performed by Vel Alonzo MD at [address] [place] [address] Right 11/28/2022    Cardiomems PA sensor device implant Left PA    CARDIAC SURGERY      CATARACT REMOVAL      CHOLECYSTECTOMY  10/15/2013    COLECTOMY N/A 5/26/2023    LAPAROSCOPIC ASSISTED LYSIS OF ADHESIONS AND PUSH ENTEROSCOPY performed by Bernie Barrera MD at [address]  2007    dr Nkechi Garcia and check in 5 years. COLONOSCOPY  2017    ok dr arndt, repeat 5 years    COLONOSCOPY N/A 5/10/2023    COLONOSCOPY performed by Tal Vargas MD at [address] ([address])      for benign tumor. just the uterus    JOINT REPLACEMENT  2013    right knee replacement    TONSILLECTOMY  as a child    TUMOR EXCISION      benign behind right ear about     UPPER GASTROINTESTINAL ENDOSCOPY N/A 2023    EGD CONTROL HEMORRHAGE WITH APC TO AVM performed by Antwon Butt MD at [address] N/A 2023    ENTEROSCOPY PUSH DIAGNOSTIC performed by Britton Figueroa MD at [address] [facility]       Current Medications:    No outpatient medications have been marked as taking for the 23 encounter [facility] Encounter).        Allergies:  Morphine    Immunizations :   Immunization History   Administered Date(s) Administered    COVID-19, PFIZER Bivalent, DO NOT Dilute, (age 12y+), IM, 30 mcg/0.3 mL 2023    COVID-19, PFIZER PURPLE top, DILUTE for use, (age 15 y+), 30mcg/0.3mL 2021, 2021    Influenza Vaccine, unspecified formulation 2018    Influenza, FLUAD, (age 72 y+), Adjuvanted, 0.5mL 2022    Influenza, FLUARIX, FLULAVAL, FLUZONE (age 10 mo+) AND AFLURIA, (age 1 y+), PF, 0.5mL 10/08/2020    Pneumococcal, PCV-13, PREVNAR 15, (age 6w+), IM, 0.5mL 2017    Pneumococcal, PPSV23, PNEUMOVAX 21, (age 2y+), SC/IM, 0.5mL 2015         Social History:     Social History     Tobacco Use    Smoking status: Former     Packs/day: 1.00     Years: 37.00     Pack years: 37.00     Types: Cigarettes     Start date: 1976     Quit date: 2013     Years since quittin.9     Passive exposure: Past    Smokeless tobacco: Former    Tobacco comments:     E cigarette now and then   Vaping Use

## 2023-09-07 NOTE — PROGRESS NOTES
Physical Therapy  Facility/Department: [facility] PROGRESSIVE CARE  Physical Therapy Initial Assessment    Name: Aleksandra Valentin  : 1945  MRN: [id number]  Date of Service: 2023    Discharge Recommendations:  24 hour supervision or assist, Home with Home health PT   PT Equipment Recommendations  Equipment Needed: No      Aleksandra Valentin scored a 15/24 on the AM-PAC short mobility form. Current research shows that an AM-PAC score of 18 or greater is typically associated with a discharge to the patient's home setting. Despite the patient's AM-PAC score and their current functional mobility deficits, it is recommended that the patient have 2-3 sessions per week of Physical Therapy at d/c to increase the patient's independence. At this time, this patient demonstrates the endurance and safety to discharge home with Home PT and a follow up treatment frequency of 2-3x/wk. Please see assessment section for further patient specific details. If patient discharges prior to next session this note will serve as a discharge summary. Please see below for the latest assessment towards goals. Patient Diagnosis(es): The primary encounter diagnosis was Non-STEMI (non-ST elevated myocardial infarction) ([address] Diagnoses of Chronic renal failure, stage 3b ([address]) and Acute on chronic congestive heart failure, unspecified heart failure type [facility]) were also pertinent to this visit. Past Medical History:  has a past medical history of AAA (abdominal aortic aneurysm) ([address]), AAA (abdominal aortic aneurysm) without rupture (HCC), Atrial fibrillation ([address]), CAD (coronary artery disease), CHF (congestive heart failure) ([address]), COPD (chronic obstructive pulmonary disease) ([address]), History of blood clots, Hyperlipidemia, and Hypertension. Past Surgical History:  has a past surgical history that includes Colonoscopy (2007); Hysterectomy ();  Appendectomy (); bladder suspension; Cataract removal; Tonsillectomy (as a (short distances with 1BX)  Transfer Assistance: Independent  Active : No  IADL Comments: Primarily sleeps in recliner  Additional Comments: Pt with multiple hospital admissions this year- most recently 7/31-8/9 and discharged home. Vision/Hearing  Vision  Vision: Within Functional Limits  Hearing  Hearing: Within functional limits (slightly [place])    Cognition   Orientation  Overall Orientation Status: Within Functional Limits  Cognition  Overall Cognitive Status: WFL  Cognition Comment: very anxious     Objective     AROM RLE (degrees)  RLE AROM: WFL  AROM LLE (degrees)  LLE AROM : WFL  Strength RLE  Strength RLE: WFL  Comment: early fatigue  Strength LLE  Strength LLE: WFL  Comment: early fatigue           Bed mobility  Supine to Sit: Moderate assistance (HOB fully elevated)  Scooting:  Moderate assistance  Transfers  Sit to Stand: Minimal Assistance  Stand to Sit: Minimal Assistance  Ambulation  Surface: Level tile  Device: Rolling Walker  Assistance:  (with assist of another for lines)  Quality of Gait: very anxious and moaning while walking; no overt LOB but unsteady; spO2 remained in 90s throughout but pt felt SOB; pt denies pain reports she is just scared  Distance: 20'  Comments: positioned in chair for comfort with LEs elevated and all needs in reach;     Balance  Comments: CGA/SBA for sitting EOB; CGA for static standing with RW           OutComes Score                                                  AM-PAC Score  AM-PAC Inpatient Mobility Raw Score : 15 (09/07/23 1423)  AM-PAC Inpatient T-Scale Score : 39.45 (09/07/23 1423)  Mobility Inpatient CMS 0-100% Score: 57.7 (09/07/23 1423)  Mobility Inpatient CMS G-Code Modifier : CK (09/07/23 1423)          Tinneti Score       Goals  Short Term Goals  Time Frame for Short Term Goals: by discharge  Short Term Goal 1: bed mob SBA  Short Term Goal 2: transfers SBA  Short Term Goal 3: amb 22' with RW SBA  Patient Goals   Patient Goals : to return home

## 2023-09-08 PROBLEM — Z22.322 MRSA (METHICILLIN RESISTANT STAPHYLOCOCCUS AUREUS) COLONIZATION: Status: ACTIVE | Noted: 2023-09-08

## 2023-09-08 PROBLEM — B37.0 THRUSH, ORAL: Status: ACTIVE | Noted: 2023-09-08

## 2023-09-08 LAB
ANION GAP SERPL CALCULATED.3IONS-SCNC: 12 MMOL/L (ref 3–16)
BASOPHILS # BLD: 0.1 K/UL (ref 0–0.2)
BASOPHILS NFR BLD: 0.3 %
BUN SERPL-MCNC: 58 MG/DL (ref 7–20)
CALCIUM SERPL-MCNC: 8.8 MG/DL (ref 8.3–10.6)
CHLORIDE SERPL-SCNC: 95 MMOL/L (ref 99–110)
CO2 SERPL-SCNC: 29 MMOL/L (ref 21–32)
CREAT SERPL-MCNC: 2.1 MG/DL (ref 0.6–1.2)
DEPRECATED RDW RBC AUTO: 17.6 % (ref 12.4–15.4)
EOSINOPHIL # BLD: 0 K/UL (ref 0–0.6)
EOSINOPHIL NFR BLD: 0.2 %
EST. AVERAGE GLUCOSE BLD GHB EST-MCNC: 119.8 MG/DL
GFR SERPLBLD CREATININE-BSD FMLA CKD-EPI: 24 ML/MIN/{1.73_M2}
GLUCOSE SERPL-MCNC: 153 MG/DL (ref 70–99)
HBA1C MFR BLD: 5.8 %
HCT VFR BLD AUTO: 28.6 % (ref 36–48)
HGB BLD-MCNC: 9.4 G/DL (ref 12–16)
LYMPHOCYTES # BLD: 0.5 K/UL (ref 1–5.1)
LYMPHOCYTES NFR BLD: 3.1 %
MCH RBC QN AUTO: 28.7 PG (ref 26–34)
MCHC RBC AUTO-ENTMCNC: 33 G/DL (ref 31–36)
MCV RBC AUTO: 86.9 FL (ref 80–100)
MONOCYTES # BLD: 0.8 K/UL (ref 0–1.3)
MONOCYTES NFR BLD: 5.2 %
NEUTROPHILS # BLD: 14.8 K/UL (ref 1.7–7.7)
NEUTROPHILS NFR BLD: 91.2 %
PLATELET # BLD AUTO: 251 K/UL (ref 135–450)
PMV BLD AUTO: 9.8 FL (ref 5–10.5)
POTASSIUM SERPL-SCNC: 3.8 MMOL/L (ref 3.5–5.1)
RBC # BLD AUTO: 3.29 M/UL (ref 4–5.2)
SODIUM SERPL-SCNC: 136 MMOL/L (ref 136–145)
VANCOMYCIN SERPL-MCNC: 18.5 UG/ML
WBC # BLD AUTO: 16.2 K/UL (ref 4–11)

## 2023-09-08 PROCEDURE — 99233 SBSQ HOSP IP/OBS HIGH 50: CPT | Performed by: INTERNAL MEDICINE

## 2023-09-08 PROCEDURE — 94760 N-INVAS EAR/PLS OXIMETRY 1: CPT

## 2023-09-08 PROCEDURE — 85025 COMPLETE CBC W/AUTO DIFF WBC: CPT

## 2023-09-08 PROCEDURE — 6370000000 HC RX 637 (ALT 250 FOR IP): Performed by: HOSPITALIST

## 2023-09-08 PROCEDURE — 80048 BASIC METABOLIC PNL TOTAL CA: CPT

## 2023-09-08 PROCEDURE — 94640 AIRWAY INHALATION TREATMENT: CPT

## 2023-09-08 PROCEDURE — 6360000002 HC RX W HCPCS: Performed by: INTERNAL MEDICINE

## 2023-09-08 PROCEDURE — 6370000000 HC RX 637 (ALT 250 FOR IP): Performed by: NURSE PRACTITIONER

## 2023-09-08 PROCEDURE — 99232 SBSQ HOSP IP/OBS MODERATE 35: CPT | Performed by: INTERNAL MEDICINE

## 2023-09-08 PROCEDURE — 6370000000 HC RX 637 (ALT 250 FOR IP): Performed by: INTERNAL MEDICINE

## 2023-09-08 PROCEDURE — 2700000000 HC OXYGEN THERAPY PER DAY

## 2023-09-08 PROCEDURE — 97530 THERAPEUTIC ACTIVITIES: CPT

## 2023-09-08 PROCEDURE — 80202 ASSAY OF VANCOMYCIN: CPT

## 2023-09-08 PROCEDURE — 6360000002 HC RX W HCPCS: Performed by: NURSE PRACTITIONER

## 2023-09-08 PROCEDURE — 2060000000 HC ICU INTERMEDIATE R&B

## 2023-09-08 PROCEDURE — 2580000003 HC RX 258: Performed by: HOSPITALIST

## 2023-09-08 PROCEDURE — 83036 HEMOGLOBIN GLYCOSYLATED A1C: CPT

## 2023-09-08 PROCEDURE — 36415 COLL VENOUS BLD VENIPUNCTURE: CPT

## 2023-09-08 RX ORDER — FUROSEMIDE 10 MG/ML
60 INJECTION INTRAMUSCULAR; INTRAVENOUS 2 TIMES DAILY
Status: DISCONTINUED | OUTPATIENT
Start: 2023-09-08 | End: 2023-09-10

## 2023-09-08 RX ORDER — FUROSEMIDE 10 MG/ML
20 INJECTION INTRAMUSCULAR; INTRAVENOUS ONCE
Status: COMPLETED | OUTPATIENT
Start: 2023-09-08 | End: 2023-09-08

## 2023-09-08 RX ORDER — DOFETILIDE 0.12 MG/1
125 CAPSULE ORAL EVERY 12 HOURS SCHEDULED
Status: DISCONTINUED | OUTPATIENT
Start: 2023-09-08 | End: 2023-09-17 | Stop reason: HOSPADM

## 2023-09-08 RX ADMIN — IPRATROPIUM BROMIDE AND ALBUTEROL SULFATE 1 DOSE: 2.5; .5 SOLUTION RESPIRATORY (INHALATION) at 08:27

## 2023-09-08 RX ADMIN — IPRATROPIUM BROMIDE AND ALBUTEROL SULFATE 1 DOSE: 2.5; .5 SOLUTION RESPIRATORY (INHALATION) at 20:04

## 2023-09-08 RX ADMIN — NYSTATIN 500000 UNITS: 100000 SUSPENSION ORAL at 17:45

## 2023-09-08 RX ADMIN — RANOLAZINE 500 MG: 500 TABLET, FILM COATED, EXTENDED RELEASE ORAL at 08:10

## 2023-09-08 RX ADMIN — METHYLPREDNISOLONE SODIUM SUCCINATE 40 MG: 40 INJECTION, POWDER, FOR SOLUTION INTRAMUSCULAR; INTRAVENOUS at 08:11

## 2023-09-08 RX ADMIN — FERROUS SULFATE TAB EC 324 MG (65 MG FE EQUIVALENT) 324 MG: 324 (65 FE) TABLET DELAYED RESPONSE at 08:10

## 2023-09-08 RX ADMIN — IPRATROPIUM BROMIDE AND ALBUTEROL SULFATE 1 DOSE: 2.5; .5 SOLUTION RESPIRATORY (INHALATION) at 16:19

## 2023-09-08 RX ADMIN — DOFETILIDE 125 MCG: 0.12 CAPSULE ORAL at 08:10

## 2023-09-08 RX ADMIN — MOMETASONE FUROATE AND FORMOTEROL FUMARATE DIHYDRATE 2 PUFF: 200; 5 AEROSOL RESPIRATORY (INHALATION) at 08:27

## 2023-09-08 RX ADMIN — FUROSEMIDE 20 MG: 10 INJECTION, SOLUTION INTRAMUSCULAR; INTRAVENOUS at 11:56

## 2023-09-08 RX ADMIN — SODIUM CHLORIDE, PRESERVATIVE FREE 10 ML: 5 INJECTION INTRAVENOUS at 08:11

## 2023-09-08 RX ADMIN — SODIUM CHLORIDE, PRESERVATIVE FREE 10 ML: 5 INJECTION INTRAVENOUS at 21:34

## 2023-09-08 RX ADMIN — FUROSEMIDE 40 MG: 10 INJECTION, SOLUTION INTRAMUSCULAR; INTRAVENOUS at 08:10

## 2023-09-08 RX ADMIN — POTASSIUM CHLORIDE 40 MEQ: 1500 TABLET, EXTENDED RELEASE ORAL at 08:09

## 2023-09-08 RX ADMIN — PANTOPRAZOLE SODIUM 40 MG: 40 TABLET, DELAYED RELEASE ORAL at 06:25

## 2023-09-08 RX ADMIN — CLINDAMYCIN IN 5 PERCENT DEXTROSE 600 MG: 12 INJECTION, SOLUTION INTRAVENOUS at 17:42

## 2023-09-08 RX ADMIN — FERROUS SULFATE TAB EC 324 MG (65 MG FE EQUIVALENT) 324 MG: 324 (65 FE) TABLET DELAYED RESPONSE at 17:45

## 2023-09-08 RX ADMIN — HYDRALAZINE HYDROCHLORIDE 50 MG: 50 TABLET, FILM COATED ORAL at 14:23

## 2023-09-08 RX ADMIN — DILTIAZEM HYDROCHLORIDE 120 MG: 120 CAPSULE, COATED, EXTENDED RELEASE ORAL at 08:10

## 2023-09-08 RX ADMIN — HYDRALAZINE HYDROCHLORIDE 50 MG: 50 TABLET, FILM COATED ORAL at 21:34

## 2023-09-08 RX ADMIN — DOFETILIDE 125 MCG: 0.12 CAPSULE ORAL at 21:34

## 2023-09-08 RX ADMIN — OXYCODONE HYDROCHLORIDE AND ACETAMINOPHEN 500 MG: 500 TABLET ORAL at 08:10

## 2023-09-08 RX ADMIN — CARVEDILOL 6.25 MG: 6.25 TABLET, FILM COATED ORAL at 17:45

## 2023-09-08 RX ADMIN — EMPAGLIFLOZIN 10 MG: 10 TABLET, FILM COATED ORAL at 08:10

## 2023-09-08 RX ADMIN — MOMETASONE FUROATE AND FORMOTEROL FUMARATE DIHYDRATE 2 PUFF: 200; 5 AEROSOL RESPIRATORY (INHALATION) at 20:04

## 2023-09-08 RX ADMIN — CLINDAMYCIN IN 5 PERCENT DEXTROSE 600 MG: 12 INJECTION, SOLUTION INTRAVENOUS at 22:35

## 2023-09-08 RX ADMIN — IPRATROPIUM BROMIDE AND ALBUTEROL SULFATE 1 DOSE: 2.5; .5 SOLUTION RESPIRATORY (INHALATION) at 01:05

## 2023-09-08 RX ADMIN — CLINDAMYCIN IN 5 PERCENT DEXTROSE 600 MG: 12 INJECTION, SOLUTION INTRAVENOUS at 06:28

## 2023-09-08 RX ADMIN — CLINDAMYCIN IN 5 PERCENT DEXTROSE 600 MG: 12 INJECTION, SOLUTION INTRAVENOUS at 01:23

## 2023-09-08 RX ADMIN — RIVAROXABAN 15 MG: 15 TABLET, FILM COATED ORAL at 08:10

## 2023-09-08 RX ADMIN — IPRATROPIUM BROMIDE AND ALBUTEROL SULFATE 1 DOSE: 2.5; .5 SOLUTION RESPIRATORY (INHALATION) at 12:32

## 2023-09-08 RX ADMIN — ISOSORBIDE MONONITRATE 120 MG: 60 TABLET, EXTENDED RELEASE ORAL at 08:09

## 2023-09-08 RX ADMIN — CARVEDILOL 6.25 MG: 6.25 TABLET, FILM COATED ORAL at 08:09

## 2023-09-08 RX ADMIN — ROSUVASTATIN CALCIUM 10 MG: 10 TABLET, FILM COATED ORAL at 08:09

## 2023-09-08 RX ADMIN — FUROSEMIDE 60 MG: 10 INJECTION, SOLUTION INTRAMUSCULAR; INTRAVENOUS at 17:45

## 2023-09-08 RX ADMIN — NYSTATIN 500000 UNITS: 100000 SUSPENSION ORAL at 21:35

## 2023-09-08 RX ADMIN — RANOLAZINE 500 MG: 500 TABLET, FILM COATED, EXTENDED RELEASE ORAL at 21:34

## 2023-09-08 RX ADMIN — IPRATROPIUM BROMIDE AND ALBUTEROL SULFATE 1 DOSE: 2.5; .5 SOLUTION RESPIRATORY (INHALATION) at 23:27

## 2023-09-08 NOTE — PROGRESS NOTES
ears and nose normal.  Neck: Trachea midline. No obvious mass. Resp: Decreased breath sounds bilaterally  CV: Regular rate. Regular rhythm. No murmur or rub. No edema. GI: Non-tender. Non-distended. No hernia. Skin: Warm, dry, normal texture and turgor. Lymph: No cervical LAD. No supraclavicular LAD. M/S: / Ext. No cyanosis. No clubbing. No joint deformity. Neuro: CN 2-12 are intact,  no neurologic deficits noted. Labs:   Recent Labs     09/05/23  0441 09/06/23  0559 09/06/23  1833   WBC 31.0* 24.0* 20.2*   HGB 11.4* 9.5* 9.6*   HCT 34.3* 29.7* 29.7*    262 237     Recent Labs     09/05/23  1638 09/06/23  0559 09/07/23  0455    137 139   K 4.2 3.6 3.7   CL 93* 93* 95*   CO2 35* 34* 29   BUN 53* 52* 57*   CREATININE 2.4* 2.2* 2.2*   CALCIUM 8.5 8.7 8.6     No results for input(s): AST, ALT, BILIDIR, BILITOT, ALKPHOS in the last 72 hours. No results for input(s): INR in the last 72 hours. No results for input(s): Munson Reza in the last 72 hours. Urinalysis:      Lab Results   Component Value Date/Time    NITRU Negative 09/05/2023 07:04 PM    WBCUA 23 09/05/2023 07:04 PM    BACTERIA None Seen 09/05/2023 07:04 PM    RBCUA 1217 09/05/2023 07:04 PM    BLOODU LARGE 09/05/2023 07:04 PM    SPECGRAV 1.011 09/05/2023 07:04 PM    GLUCOSEU 100 09/05/2023 07:04 PM       Radiology:  CT SOFT TISSUE NECK WO CONTRAST   Final Result   1. Findings consistent with an acute left parotiditis. No abscess or   sialolith is identified. 2. Edematous appearance of the epiglottis and aryepiglottic folds with   associated retropharyngeal edema and moderate supraglottic airway narrowing. The findings are suggestive of acute epiglottitis. The findings were sent to the Radiology Results [address] at 2:23   pm on 9/5/2023 to be communicated to a licensed caregiver. US PAROTID   Preliminary Result   Complex collection adjacent to the right parotid gland may represent an   abscess.

## 2023-09-08 NOTE — PROGRESS NOTES
Occupational Therapy  Facility/Department: [facility] PROGRESSIVE CARE  Occupational Therapy Daily Assessment    Name: Velma Saleem  : 1945  MRN: [id number]  Date of Service: 2023    Discharge Recommendations:  24 hour supervision or assist  OT Equipment Recommendations  Equipment Needed: No     Velma Saleem scored a 15/24 on the AM-PAC ADL Inpatient form. At this time, no further OT is recommended upon discharge due to family support and pt declining home OT. Recommend patient returns to prior setting with prior services. Patient Diagnosis(es): The primary encounter diagnosis was Non-STEMI (non-ST elevated myocardial infarction) ([address] Diagnoses of Chronic renal failure, stage 3b ([address]) and Acute on chronic congestive heart failure, unspecified heart failure type [facility]) were also pertinent to this visit. Past Medical History:  has a past medical history of AAA (abdominal aortic aneurysm) ([address]), AAA (abdominal aortic aneurysm) without rupture (HCC), Atrial fibrillation ([address]), CAD (coronary artery disease), CHF (congestive heart failure) ([address]), COPD (chronic obstructive pulmonary disease) ([address]), History of blood clots, Hyperlipidemia, and Hypertension. Past Surgical History:  has a past surgical history that includes Colonoscopy (2007); Hysterectomy (); Appendectomy (); bladder suspension; Cataract removal; Tonsillectomy (as a child); tumor excision; Cholecystectomy (10/15/2013); Colonoscopy (2017); joint replacement (2013); Abdominal aortic aneurysm repair; Cardiac surgery; Cardiac catheterization (Right, 2022); Upper gastrointestinal endoscopy (N/A, 2023); Colonoscopy (N/A, 5/10/2023); Capsule endoscopy (N/A, 2023); Upper gastrointestinal endoscopy (N/A, 2023); and colectomy (N/A, 2023). Assessment   Performance deficits / Impairments: Decreased functional mobility ; Decreased safe awareness;Decreased balance;Decreased ADL  reported that patient has been very sleepy and lethargic. Been using 4 L nasal cannula oxygen. History of fever chills no cough or chest pain. \"  Family / Caregiver Present: No  Referring Practitioner: Franklin Villalobos MD  Subjective  Subjective: Pt seen bedside and agreeable to therapy with encouragement. General Comment  Comments: Per RN ok for therapy     Social/Functional History  Social/Functional History  Lives With: Spouse  Type of Home: Mobile home  Home Layout: One level  Home Access: Stairs to enter without rails  Entrance Stairs - Number of Steps: 1 small step  Bathroom Shower/Tub: Tub/Shower unit (walk in tub)  Bathroom Toilet: Bedside commode  Bathroom Equipment: Built-in shower seat, Hand-held shower, 3-in-1 commode, Grab bars in shower  Home Equipment: Marcelina Sis, 4 wheeled, Oxygen, Wheelchair-manual, Lift chair  ADL Assistance: Needs assistance  Homemaking Assistance:  (spouse completes all IADLs)  Ambulation Assistance: Independent (short distances with 1DR)  Transfer Assistance: Independent  Active : No  IADL Comments: Primarily sleeps in recliner  Additional Comments: Pt with multiple hospital admissions this year- most recently 7/31-8/9 and discharged home. Objective           Observation/Palpation  Posture: Fair  Safety Devices  Type of Devices: Call light within reach; Chair alarm in place; Left in chair;Nurse notified;Gait belt;Patient at risk for falls; All fall risk precautions in place  Restraints  Restraints Initially in Place: No           ADL  LE Dressing: Maximum assistance (assist to change soiled depends)  Additional Comments: Anticipate pt will be max A for LB bathing/dressing and toileting, min A for UB bathing/dressing and grooming when seated based on balance and endurance observed     Activity Tolerance  Activity Tolerance Comments: limited by anxiety and breathing status     Transfers  Sit to stand: Contact guard assistance  Stand to sit: Contact guard

## 2023-09-08 NOTE — PROGRESS NOTES
Nutrition Assessment     Type and Reason for Visit: RD Nutrition Re-Screen/LOS    Nutrition Recommendations/Plan:   Contineu 3 carb low sodium diet w/ 1500 ml fluid restriction, monitor intakes     Malnutrition Assessment:  Malnutrition Status: No malnutrition    Nutrition Assessment:  Length of stay. Pt w/ hx of severe COPD, coronary artery disease, chronic CHF, atrial fibrillation, obesity, presents w/ SOB. Pt w/ no recent hx of significant wt loss. Currently on 3 carb low sodium diet. Pt w/ good intakes, mostly greater than 50% of meals eaten. Nutrition related labs reviewed, current diet order appropriate for pt. No nutrition intervention needed at this time. Nutrition Related Findings:   153 glucose, 9/7 BM Wound Type: None    Current Nutrition Therapies:    ADULT DIET; Regular; 3 carb choices (45 gm/meal);  Low Sodium (2 gm); 1500 ml    Anthropometric Measures:  Height: 5' 3\" (160 cm)  Current Body Wt: 229 lb 0.9 oz (103.9 kg)   BMI: 40.6    Nutrition Diagnosis:   No nutrition diagnosis at this time     Nutrition Interventions:   Food and/or Nutrient Delivery: Continue Current Diet  Nutrition Education/Counseling: Education not indicated  Coordination of Nutrition Care: Continue to monitor while inpatient       Goals:     Goals: Meet at least 75% of estimated needs, by next RD assessment       Nutrition Monitoring and Evaluation:   Behavioral-Environmental Outcomes: None Identified  Food/Nutrient Intake Outcomes: Food and Nutrient Intake  Physical Signs/Symptoms Outcomes: Biochemical Data, Weight, Nutrition Focused Physical Findings    Discharge Planning:    No discharge needs at this time     Gabriel Bosworth, [address]: [phone]

## 2023-09-08 NOTE — PROGRESS NOTES
Physical Therapy  Facility/Department: [facility] 2D PROGRESSIVE CARE  Physical Therapy Initial Assessment    Name: Martínez Vivar  : 1945  MRN: [id number]  Date of Service: 2023    Discharge Recommendations:  24 hour supervision or assist, Home with Home health PT   PT Equipment Recommendations  Equipment Needed: No    Martínez Vivar scored a 15/24 on the AM-PAC short mobility form. Current research shows that an AM-PAC score of 18 or greater is typically associated with a discharge to the patient's home setting. Based on the patient's AM-PAC score and their current functional mobility deficits, it is recommended that the patient have 2-3 sessions per week of Physical Therapy at d/c to increase the patient's independence. At this time, this patient demonstrates the endurance and safety to discharge home with home health PT (home vs OP services) and a follow up treatment frequency of 2-3x/wk. Please see assessment section for further patient specific details. If patient discharges prior to next session this note will serve as a discharge summary. Please see below for the latest assessment towards goals. Patient Diagnosis(es): The primary encounter diagnosis was Non-STEMI (non-ST elevated myocardial infarction) ([address]). Diagnoses of Chronic renal failure, stage 3b ([address]) and Acute on chronic congestive heart failure, unspecified heart failure type [facility]) were also pertinent to this visit. Past Medical History:  has a past medical history of AAA (abdominal aortic aneurysm) ([address]), AAA (abdominal aortic aneurysm) without rupture (HCC), Atrial fibrillation ([address]), CAD (coronary artery disease), CHF (congestive heart failure) ([address]), COPD (chronic obstructive pulmonary disease) ([address]), History of blood clots, Hyperlipidemia, and Hypertension. Past Surgical History:  has a past surgical history that includes Colonoscopy (2007); Hysterectomy ();  Appendectomy (); bladder suspension; Cataract removal;

## 2023-09-08 NOTE — PROGRESS NOTES
[address] [facility]   Daily Progress Note      Admit Date:  9/1/2023    CC: SOB    HPI:   Cindy Gilliland is a 69 y.o. female with PMH  CAD, AF s/p PVI ablation 10/2020- recurrent AF with DCCV 1/2021 and repeat AF ablation 2/2021 (failed flecaininde), HTN, aortic aneurysm, AAA s/p repair 3/2018 (Dr. Cristina Mar, DENISE (intolerant to CPAP). Nick Rafter stress>[facility] 3/2018 revealed  of RCA and non obstructive dz of LAD and LCX. Adm to [facility] 5/2022 for dofetilide loading, converted to NSR. Abn Stress followed by [facility] 9/20/2022 showed CAD. Multiple HF/hypoxic resp failure hospitalizations/ re admissions over last year. S/P Cardiomems PA sensor implant 11/2022. Non complaint with readings. GIB 4/2023 & 5/2023 requiring multiple units of PRBC>EGD with gastric AVMs. Admitted 6/25-7/2/2023 AMS, found to have UTI. Mentation improved with treatment of UTI. Cardiology consulted and hypervolemic. IV Lasix was started with improvement in SOB, bloating and edema. Orthopnea. 4L NC     Presented to Dr. Turner Newer office with SOB and edema. Sent to ED for decompensated HF. Initial Cardiomems PAD elevated 35. Started on Lasix IV and metolazone- diuresing. Now with Left neck swelling and pain-CT scan revealed parotiditis- improving  She is lethargic but arouses and says he r breathing and swelling slightly improved. However, now on 4 L of nasal cannula with increased bilateral lower extremity swelling  BP has been elevated. Interval history 9/8/2023  -Had modest diuresis with IV Lasix. Weight remains unchanged  -Tenderness on the right side of her face is improving  -Remains on 4 L of oxygen which is her home requirement  -Lower extremity edema persist  -Heart rate remains in mid 50s and start of reducing her Tikosyn    Review of Systems:   General: Denies fever, chills  Skin: Denies skin changes, rash, itching, lesions.   HEENT: Denies headache, dizziness, vision changes, nosebleeds, sore throat, nasal drainage  RESP:

## 2023-09-08 NOTE — PROGRESS NOTES
Infectious Diseases Inpatient Progress  Note        CHIEF COMPLAINT:     WBC elevation  Left acute parotitis  COPD  CHF  MARK on CKD  MRSA colonization              HISTORY OF PRESENT ILLNESS:  69 y.o. woman with a significant history for abdominal aortic aneurysm, atrial fibrillation, coronary artery disease, congestive heart failure, COPD, hypertension. Status post endovascular repair of the aneurysm admitted to the  hospital secondary to worsening edema shortness of breath. She is currently using 4l  nasal cannula. In [facility]  hospital developed a left Parotid gland swelling pain redness concern pancreatitis. She underwent CT soft tissue of the neck indicated Cellulitis no abscess formation. Labs indicate WBC 31.  Hb at  9.6,  Creat  2.5.,  We are consulted for IV abx  recommendations      Interval History : remains sob and using nasal cannula sitting in chair and Left parotid area swelling improving and no vomiting and no chills and tolerating IV abx ok        Past Medical History:    Past Medical History:   Diagnosis Date    AAA (abdominal aortic aneurysm) (HCC)     pt states it is 4cm    AAA (abdominal aortic aneurysm) without rupture (HCC) 2/10/2015    Atrial fibrillation (HCC)     CAD (coronary artery disease)     CHF (congestive heart failure) (HCC)     COPD (chronic obstructive pulmonary disease) ([address])     History of blood clots     Hyperlipidemia     Hypertension        Past Surgical History:    Past Surgical History:   Procedure Laterality Date    ABDOMINAL AORTIC ANEURYSM REPAIR      Endovascular abdominal AA    APPENDECTOMY  1990    incidental    BLADDER SUSPENSION      CAPSULE ENDOSCOPY N/A 5/22/2023    ESOPHAGEAL CAPSULE ENDOSCOPY performed by Lorice Opitz, MD at [address] [place] [address] Right 11/28/2022    Cardiomems PA sensor device implant Left PA    CARDIAC SURGERY      CATARACT REMOVAL      CHOLECYSTECTOMY  10/15/2013    COLECTOMY N/A 5/26/2023    LAPAROSCOPIC ASSISTED acetaminophen    Imaging:   CT SOFT TISSUE NECK WO CONTRAST   Final Result   1. Findings consistent with an acute left parotiditis. No abscess or   sialolith is identified. 2. Edematous appearance of the epiglottis and aryepiglottic folds with   associated retropharyngeal edema and moderate supraglottic airway narrowing. The findings are suggestive of acute epiglottitis. The findings were sent to the Radiology [facility] [facility] at 2:23   pm on 9/5/2023 to be communicated to a licensed caregiver. US PAROTID   Preliminary Result   Complex collection adjacent to the right parotid gland may represent an   abscess. Cross-sectional imaging with C2 neck with contrast may be helpful   for further evaluation. XR CHEST PORTABLE   Final Result   No acute cardiopulmonary findings             All pertinent images and reports for the current Hospitalization were reviewed by me.     IMPRESSION:    Patient Active Problem List   Diagnosis Code    Essential hypertension I10    Hyperlipidemia E78.5    Coronary atherosclerosis due to calcified coronary lesion of native artery I25.10, I25.84    DENISE (obstructive sleep apnea) G47.33    History of DVT (deep vein thrombosis) Z86.718    Family history of DVT Z82.49    AAA (abdominal aortic aneurysm) without rupture ([facility]) I71.40    Pleural effusion, left J90    Pleural effusion J90    COPD exacerbation ([facility]) J44.1    Pelvic pain in female R10.2    Vitamin D deficiency E55.9    Other emphysema ([facility]) J43.8    Acute bronchitis J20.9    Acute respiratory failure with hypoxia ([facility]) J96.01    Abnormal CXR R93.89    Atypical pneumonia J18.9    Atrial fibrillation with RVR ([facility]) I48.91    Chronic diastolic (congestive) heart failure ([facility]) I50.32    PVD (peripheral vascular disease) ([facility]) I73.9    Abnormal nuclear stress test R94.39    AAA (abdominal aortic aneurysm) ([facility]) I71.40    PAF (paroxysmal atrial fibrillation) ([facility]) I48.0    Endoleak post (EVAR) endovascular

## 2023-09-08 NOTE — PROGRESS NOTES
Patient did not want to wear the Bipap tonight. Wore it for about 15 minutes and took it off per RN.

## 2023-09-09 PROBLEM — J96.21 ACUTE ON CHRONIC RESPIRATORY FAILURE WITH HYPOXEMIA (HCC): Status: ACTIVE | Noted: 2023-04-25

## 2023-09-09 LAB
ANION GAP SERPL CALCULATED.3IONS-SCNC: 14 MMOL/L (ref 3–16)
BACTERIA UR CULT: NORMAL
BUN SERPL-MCNC: 65 MG/DL (ref 7–20)
CALCIUM SERPL-MCNC: 9.3 MG/DL (ref 8.3–10.6)
CHLORIDE SERPL-SCNC: 95 MMOL/L (ref 99–110)
CO2 SERPL-SCNC: 29 MMOL/L (ref 21–32)
CREAT SERPL-MCNC: 2.2 MG/DL (ref 0.6–1.2)
GFR SERPLBLD CREATININE-BSD FMLA CKD-EPI: 22 ML/MIN/{1.73_M2}
GLUCOSE SERPL-MCNC: 170 MG/DL (ref 70–99)
POTASSIUM SERPL-SCNC: 3.2 MMOL/L (ref 3.5–5.1)
SODIUM SERPL-SCNC: 138 MMOL/L (ref 136–145)

## 2023-09-09 PROCEDURE — 80048 BASIC METABOLIC PNL TOTAL CA: CPT

## 2023-09-09 PROCEDURE — 2580000003 HC RX 258: Performed by: INTERNAL MEDICINE

## 2023-09-09 PROCEDURE — 6360000002 HC RX W HCPCS: Performed by: INTERNAL MEDICINE

## 2023-09-09 PROCEDURE — 2580000003 HC RX 258: Performed by: HOSPITALIST

## 2023-09-09 PROCEDURE — 6370000000 HC RX 637 (ALT 250 FOR IP): Performed by: HOSPITALIST

## 2023-09-09 PROCEDURE — 94640 AIRWAY INHALATION TREATMENT: CPT

## 2023-09-09 PROCEDURE — 36415 COLL VENOUS BLD VENIPUNCTURE: CPT

## 2023-09-09 PROCEDURE — 6370000000 HC RX 637 (ALT 250 FOR IP): Performed by: INTERNAL MEDICINE

## 2023-09-09 PROCEDURE — 99233 SBSQ HOSP IP/OBS HIGH 50: CPT | Performed by: NURSE PRACTITIONER

## 2023-09-09 PROCEDURE — 99231 SBSQ HOSP IP/OBS SF/LOW 25: CPT | Performed by: NURSE PRACTITIONER

## 2023-09-09 PROCEDURE — 94760 N-INVAS EAR/PLS OXIMETRY 1: CPT

## 2023-09-09 PROCEDURE — 6360000002 HC RX W HCPCS: Performed by: REGISTERED NURSE

## 2023-09-09 PROCEDURE — 2700000000 HC OXYGEN THERAPY PER DAY

## 2023-09-09 PROCEDURE — 2060000000 HC ICU INTERMEDIATE R&B

## 2023-09-09 RX ORDER — CLONIDINE HYDROCHLORIDE 0.1 MG/1
0.1 TABLET ORAL 2 TIMES DAILY
Status: DISCONTINUED | OUTPATIENT
Start: 2023-09-09 | End: 2023-09-17 | Stop reason: HOSPADM

## 2023-09-09 RX ADMIN — MOMETASONE FUROATE AND FORMOTEROL FUMARATE DIHYDRATE 2 PUFF: 200; 5 AEROSOL RESPIRATORY (INHALATION) at 20:28

## 2023-09-09 RX ADMIN — VANCOMYCIN HYDROCHLORIDE 750 MG: 750 INJECTION, POWDER, LYOPHILIZED, FOR SOLUTION INTRAVENOUS at 00:11

## 2023-09-09 RX ADMIN — HYDRALAZINE HYDROCHLORIDE 50 MG: 50 TABLET, FILM COATED ORAL at 20:34

## 2023-09-09 RX ADMIN — METHYLPREDNISOLONE SODIUM SUCCINATE 40 MG: 40 INJECTION, POWDER, FOR SOLUTION INTRAMUSCULAR; INTRAVENOUS at 09:36

## 2023-09-09 RX ADMIN — ROSUVASTATIN CALCIUM 10 MG: 10 TABLET, FILM COATED ORAL at 09:36

## 2023-09-09 RX ADMIN — CLINDAMYCIN IN 5 PERCENT DEXTROSE 600 MG: 12 INJECTION, SOLUTION INTRAVENOUS at 22:42

## 2023-09-09 RX ADMIN — CLINDAMYCIN IN 5 PERCENT DEXTROSE 600 MG: 12 INJECTION, SOLUTION INTRAVENOUS at 16:16

## 2023-09-09 RX ADMIN — DOFETILIDE 125 MCG: 0.12 CAPSULE ORAL at 09:36

## 2023-09-09 RX ADMIN — IPRATROPIUM BROMIDE AND ALBUTEROL SULFATE 1 DOSE: 2.5; .5 SOLUTION RESPIRATORY (INHALATION) at 20:27

## 2023-09-09 RX ADMIN — FERROUS SULFATE TAB EC 324 MG (65 MG FE EQUIVALENT) 324 MG: 324 (65 FE) TABLET DELAYED RESPONSE at 17:47

## 2023-09-09 RX ADMIN — CARVEDILOL 6.25 MG: 6.25 TABLET, FILM COATED ORAL at 09:35

## 2023-09-09 RX ADMIN — ISOSORBIDE MONONITRATE 120 MG: 60 TABLET, EXTENDED RELEASE ORAL at 09:36

## 2023-09-09 RX ADMIN — FUROSEMIDE 60 MG: 10 INJECTION, SOLUTION INTRAMUSCULAR; INTRAVENOUS at 17:47

## 2023-09-09 RX ADMIN — SODIUM CHLORIDE, PRESERVATIVE FREE 10 ML: 5 INJECTION INTRAVENOUS at 20:37

## 2023-09-09 RX ADMIN — NYSTATIN 500000 UNITS: 100000 SUSPENSION ORAL at 17:47

## 2023-09-09 RX ADMIN — RIVAROXABAN 15 MG: 15 TABLET, FILM COATED ORAL at 09:35

## 2023-09-09 RX ADMIN — MOMETASONE FUROATE AND FORMOTEROL FUMARATE DIHYDRATE 2 PUFF: 200; 5 AEROSOL RESPIRATORY (INHALATION) at 07:39

## 2023-09-09 RX ADMIN — POLYETHYLENE GLYCOL 3350 17 G: 17 POWDER, FOR SOLUTION ORAL at 12:27

## 2023-09-09 RX ADMIN — FUROSEMIDE 60 MG: 10 INJECTION, SOLUTION INTRAMUSCULAR; INTRAVENOUS at 09:36

## 2023-09-09 RX ADMIN — HYDRALAZINE HYDROCHLORIDE 50 MG: 50 TABLET, FILM COATED ORAL at 12:27

## 2023-09-09 RX ADMIN — CLONIDINE HYDROCHLORIDE 0.1 MG: 0.1 TABLET ORAL at 20:34

## 2023-09-09 RX ADMIN — LABETALOL HYDROCHLORIDE 5 MG: 5 INJECTION, SOLUTION INTRAVENOUS at 04:27

## 2023-09-09 RX ADMIN — NYSTATIN 500000 UNITS: 100000 SUSPENSION ORAL at 09:36

## 2023-09-09 RX ADMIN — RANOLAZINE 500 MG: 500 TABLET, FILM COATED, EXTENDED RELEASE ORAL at 09:35

## 2023-09-09 RX ADMIN — DOFETILIDE 125 MCG: 0.12 CAPSULE ORAL at 20:34

## 2023-09-09 RX ADMIN — HYDRALAZINE HYDROCHLORIDE 50 MG: 50 TABLET, FILM COATED ORAL at 06:00

## 2023-09-09 RX ADMIN — RANOLAZINE 500 MG: 500 TABLET, FILM COATED, EXTENDED RELEASE ORAL at 20:34

## 2023-09-09 RX ADMIN — PANTOPRAZOLE SODIUM 40 MG: 40 TABLET, DELAYED RELEASE ORAL at 06:00

## 2023-09-09 RX ADMIN — NYSTATIN 500000 UNITS: 100000 SUSPENSION ORAL at 20:34

## 2023-09-09 RX ADMIN — EMPAGLIFLOZIN 10 MG: 10 TABLET, FILM COATED ORAL at 09:36

## 2023-09-09 RX ADMIN — CLINDAMYCIN IN 5 PERCENT DEXTROSE 600 MG: 12 INJECTION, SOLUTION INTRAVENOUS at 06:02

## 2023-09-09 RX ADMIN — NYSTATIN 500000 UNITS: 100000 SUSPENSION ORAL at 12:24

## 2023-09-09 RX ADMIN — OXYCODONE HYDROCHLORIDE AND ACETAMINOPHEN 500 MG: 500 TABLET ORAL at 09:35

## 2023-09-09 RX ADMIN — POTASSIUM CHLORIDE 40 MEQ: 1500 TABLET, EXTENDED RELEASE ORAL at 09:36

## 2023-09-09 RX ADMIN — CARVEDILOL 6.25 MG: 6.25 TABLET, FILM COATED ORAL at 17:47

## 2023-09-09 RX ADMIN — FERROUS SULFATE TAB EC 324 MG (65 MG FE EQUIVALENT) 324 MG: 324 (65 FE) TABLET DELAYED RESPONSE at 09:35

## 2023-09-09 NOTE — PLAN OF CARE
Problem: Discharge Planning  Goal: Discharge to home or other facility with appropriate resources  Outcome: Progressing  Flowsheets (Taken 9/8/2023 2328)  Discharge to home or other facility with appropriate resources:   Identify barriers to discharge with patient and caregiver   Arrange for needed discharge resources and transportation as appropriate   Identify discharge learning needs (meds, wound care, etc)     Problem: Safety - Adult  Goal: Free from fall injury  Outcome: Progressing  Flowsheets (Taken 9/8/2023 2328)  Free From Fall Injury: Instruct family/caregiver on patient safety     Problem: ABCDS Injury Assessment  Goal: Absence of physical injury  Outcome: Progressing  Flowsheets (Taken 9/8/2023 2328)  Absence of Physical Injury: Implement safety measures based on patient assessment     Problem: Skin/Tissue Integrity  Goal: Absence of new skin breakdown  Description: 1. Monitor for areas of redness and/or skin breakdown  2. Assess vascular access sites hourly  3. Every 4-6 hours minimum:  Change oxygen saturation probe site  4. Every 4-6 hours:  If on nasal continuous positive airway pressure, respiratory therapy assess nares and determine need for appliance change or resting period.   Outcome: Progressing     Problem: Respiratory - Adult  Goal: Achieves optimal ventilation and oxygenation  Outcome: Progressing  Flowsheets (Taken 9/8/2023 2328)  Achieves optimal ventilation and oxygenation:   Assess for changes in respiratory status   Assess for changes in mentation and behavior   Position to facilitate oxygenation and minimize respiratory effort     Problem: Cardiovascular - Adult  Goal: Maintains optimal cardiac output and hemodynamic stability  Outcome: Progressing  Flowsheets (Taken 9/8/2023 2328)  Maintains optimal cardiac output and hemodynamic stability:   Monitor urine output and notify Licensed Independent Practitioner for values outside of normal range   Monitor blood pressure and heart rate   Assess for signs of decreased cardiac output       Problem: Genitourinary - Adult  Goal: Absence of urinary retention  Outcome: Progressing

## 2023-09-09 NOTE — PROGRESS NOTES
Pt resting in bed comfortably. VSS. Call light within reach. Fall precautions in place. No further needs expressed at this time.

## 2023-09-10 LAB
ANION GAP SERPL CALCULATED.3IONS-SCNC: 10 MMOL/L (ref 3–16)
BUN SERPL-MCNC: 64 MG/DL (ref 7–20)
CALCIUM SERPL-MCNC: 9.1 MG/DL (ref 8.3–10.6)
CHLORIDE SERPL-SCNC: 99 MMOL/L (ref 99–110)
CO2 SERPL-SCNC: 32 MMOL/L (ref 21–32)
CREAT SERPL-MCNC: 2 MG/DL (ref 0.6–1.2)
GFR SERPLBLD CREATININE-BSD FMLA CKD-EPI: 25 ML/MIN/{1.73_M2}
GLUCOSE SERPL-MCNC: 109 MG/DL (ref 70–99)
NT-PROBNP SERPL-MCNC: 6342 PG/ML (ref 0–449)
POTASSIUM SERPL-SCNC: 3.9 MMOL/L (ref 3.5–5.1)
SODIUM SERPL-SCNC: 141 MMOL/L (ref 136–145)
VANCOMYCIN SERPL-MCNC: 15.7 UG/ML

## 2023-09-10 PROCEDURE — 2580000003 HC RX 258: Performed by: INTERNAL MEDICINE

## 2023-09-10 PROCEDURE — 6360000002 HC RX W HCPCS: Performed by: INTERNAL MEDICINE

## 2023-09-10 PROCEDURE — 6370000000 HC RX 637 (ALT 250 FOR IP): Performed by: HOSPITALIST

## 2023-09-10 PROCEDURE — 6370000000 HC RX 637 (ALT 250 FOR IP): Performed by: INTERNAL MEDICINE

## 2023-09-10 PROCEDURE — 36415 COLL VENOUS BLD VENIPUNCTURE: CPT

## 2023-09-10 PROCEDURE — 99233 SBSQ HOSP IP/OBS HIGH 50: CPT | Performed by: NURSE PRACTITIONER

## 2023-09-10 PROCEDURE — 2060000000 HC ICU INTERMEDIATE R&B

## 2023-09-10 PROCEDURE — 6360000002 HC RX W HCPCS: Performed by: NURSE PRACTITIONER

## 2023-09-10 PROCEDURE — 80202 ASSAY OF VANCOMYCIN: CPT

## 2023-09-10 PROCEDURE — 2700000000 HC OXYGEN THERAPY PER DAY

## 2023-09-10 PROCEDURE — 99231 SBSQ HOSP IP/OBS SF/LOW 25: CPT | Performed by: NURSE PRACTITIONER

## 2023-09-10 PROCEDURE — 94760 N-INVAS EAR/PLS OXIMETRY 1: CPT

## 2023-09-10 PROCEDURE — 80048 BASIC METABOLIC PNL TOTAL CA: CPT

## 2023-09-10 PROCEDURE — 83880 ASSAY OF NATRIURETIC PEPTIDE: CPT

## 2023-09-10 PROCEDURE — 94640 AIRWAY INHALATION TREATMENT: CPT

## 2023-09-10 RX ORDER — FUROSEMIDE 10 MG/ML
20 INJECTION INTRAMUSCULAR; INTRAVENOUS ONCE
Status: COMPLETED | OUTPATIENT
Start: 2023-09-10 | End: 2023-09-10

## 2023-09-10 RX ORDER — FUROSEMIDE 10 MG/ML
80 INJECTION INTRAMUSCULAR; INTRAVENOUS 2 TIMES DAILY
Status: DISCONTINUED | OUTPATIENT
Start: 2023-09-10 | End: 2023-09-10

## 2023-09-10 RX ADMIN — CLINDAMYCIN IN 5 PERCENT DEXTROSE 600 MG: 12 INJECTION, SOLUTION INTRAVENOUS at 15:31

## 2023-09-10 RX ADMIN — NYSTATIN 500000 UNITS: 100000 SUSPENSION ORAL at 15:22

## 2023-09-10 RX ADMIN — DOFETILIDE 125 MCG: 0.12 CAPSULE ORAL at 19:37

## 2023-09-10 RX ADMIN — MOMETASONE FUROATE AND FORMOTEROL FUMARATE DIHYDRATE 2 PUFF: 200; 5 AEROSOL RESPIRATORY (INHALATION) at 08:45

## 2023-09-10 RX ADMIN — PANTOPRAZOLE SODIUM 40 MG: 40 TABLET, DELAYED RELEASE ORAL at 05:31

## 2023-09-10 RX ADMIN — IPRATROPIUM BROMIDE AND ALBUTEROL SULFATE 1 DOSE: 2.5; .5 SOLUTION RESPIRATORY (INHALATION) at 20:14

## 2023-09-10 RX ADMIN — FERROUS SULFATE TAB EC 324 MG (65 MG FE EQUIVALENT) 324 MG: 324 (65 FE) TABLET DELAYED RESPONSE at 09:03

## 2023-09-10 RX ADMIN — EMPAGLIFLOZIN 10 MG: 10 TABLET, FILM COATED ORAL at 09:04

## 2023-09-10 RX ADMIN — DOFETILIDE 125 MCG: 0.12 CAPSULE ORAL at 09:04

## 2023-09-10 RX ADMIN — VANCOMYCIN HYDROCHLORIDE 1000 MG: 1 INJECTION, POWDER, LYOPHILIZED, FOR SOLUTION INTRAVENOUS at 09:12

## 2023-09-10 RX ADMIN — NYSTATIN 500000 UNITS: 100000 SUSPENSION ORAL at 09:04

## 2023-09-10 RX ADMIN — FUROSEMIDE 60 MG: 10 INJECTION, SOLUTION INTRAMUSCULAR; INTRAVENOUS at 09:04

## 2023-09-10 RX ADMIN — CARVEDILOL 6.25 MG: 6.25 TABLET, FILM COATED ORAL at 09:04

## 2023-09-10 RX ADMIN — CARVEDILOL 6.25 MG: 6.25 TABLET, FILM COATED ORAL at 18:06

## 2023-09-10 RX ADMIN — HYDRALAZINE HYDROCHLORIDE 50 MG: 50 TABLET, FILM COATED ORAL at 21:33

## 2023-09-10 RX ADMIN — CLINDAMYCIN IN 5 PERCENT DEXTROSE 600 MG: 12 INJECTION, SOLUTION INTRAVENOUS at 22:33

## 2023-09-10 RX ADMIN — RANOLAZINE 500 MG: 500 TABLET, FILM COATED, EXTENDED RELEASE ORAL at 19:37

## 2023-09-10 RX ADMIN — IPRATROPIUM BROMIDE AND ALBUTEROL SULFATE 1 DOSE: 2.5; .5 SOLUTION RESPIRATORY (INHALATION) at 08:44

## 2023-09-10 RX ADMIN — ISOSORBIDE MONONITRATE 120 MG: 60 TABLET, EXTENDED RELEASE ORAL at 09:03

## 2023-09-10 RX ADMIN — IPRATROPIUM BROMIDE AND ALBUTEROL SULFATE 1 DOSE: 2.5; .5 SOLUTION RESPIRATORY (INHALATION) at 12:11

## 2023-09-10 RX ADMIN — POTASSIUM CHLORIDE 40 MEQ: 1500 TABLET, EXTENDED RELEASE ORAL at 09:03

## 2023-09-10 RX ADMIN — MOMETASONE FUROATE AND FORMOTEROL FUMARATE DIHYDRATE 2 PUFF: 200; 5 AEROSOL RESPIRATORY (INHALATION) at 20:14

## 2023-09-10 RX ADMIN — CLONIDINE HYDROCHLORIDE 0.1 MG: 0.1 TABLET ORAL at 19:38

## 2023-09-10 RX ADMIN — FERROUS SULFATE TAB EC 324 MG (65 MG FE EQUIVALENT) 324 MG: 324 (65 FE) TABLET DELAYED RESPONSE at 18:06

## 2023-09-10 RX ADMIN — NYSTATIN 500000 UNITS: 100000 SUSPENSION ORAL at 19:38

## 2023-09-10 RX ADMIN — METHYLPREDNISOLONE SODIUM SUCCINATE 40 MG: 40 INJECTION, POWDER, FOR SOLUTION INTRAMUSCULAR; INTRAVENOUS at 11:52

## 2023-09-10 RX ADMIN — FUROSEMIDE 20 MG: 10 INJECTION, SOLUTION INTRAMUSCULAR; INTRAVENOUS at 11:52

## 2023-09-10 RX ADMIN — ROSUVASTATIN CALCIUM 10 MG: 10 TABLET, FILM COATED ORAL at 09:04

## 2023-09-10 RX ADMIN — OXYCODONE HYDROCHLORIDE AND ACETAMINOPHEN 500 MG: 500 TABLET ORAL at 09:03

## 2023-09-10 RX ADMIN — CLONIDINE HYDROCHLORIDE 0.1 MG: 0.1 TABLET ORAL at 09:04

## 2023-09-10 RX ADMIN — CLINDAMYCIN IN 5 PERCENT DEXTROSE 600 MG: 12 INJECTION, SOLUTION INTRAVENOUS at 05:47

## 2023-09-10 RX ADMIN — HYDRALAZINE HYDROCHLORIDE 50 MG: 50 TABLET, FILM COATED ORAL at 05:31

## 2023-09-10 RX ADMIN — RANOLAZINE 500 MG: 500 TABLET, FILM COATED, EXTENDED RELEASE ORAL at 09:04

## 2023-09-10 RX ADMIN — IPRATROPIUM BROMIDE AND ALBUTEROL SULFATE 1 DOSE: 2.5; .5 SOLUTION RESPIRATORY (INHALATION) at 15:38

## 2023-09-10 RX ADMIN — HYDRALAZINE HYDROCHLORIDE 50 MG: 50 TABLET, FILM COATED ORAL at 15:22

## 2023-09-10 RX ADMIN — RIVAROXABAN 15 MG: 15 TABLET, FILM COATED ORAL at 09:03

## 2023-09-10 RX ADMIN — FUROSEMIDE 5 MG/HR: 10 INJECTION, SOLUTION INTRAMUSCULAR; INTRAVENOUS at 15:32

## 2023-09-10 RX ADMIN — NYSTATIN 500000 UNITS: 100000 SUSPENSION ORAL at 18:06

## 2023-09-10 NOTE — PLAN OF CARE
Problem: Discharge Planning  Goal: Discharge to home or other facility with appropriate resources  9/10/2023 1221 by Shruthi Sewell RN  Outcome: Progressing  Flowsheets (Taken 9/10/2023 0901)  Discharge to home or other facility with appropriate resources: Identify barriers to discharge with patient and caregiver     Problem: Safety - Adult  Goal: Free from fall injury  9/10/2023 1221 by Shruthi Sewell RN  Outcome: Progressing     Problem: ABCDS Injury Assessment  Goal: Absence of physical injury  9/10/2023 1221 by Shruthi Sewell RN  Outcome: Progressing     Problem: Skin/Tissue Integrity  Goal: Absence of new skin breakdown  Description: 1. Monitor for areas of redness and/or skin breakdown  2. Assess vascular access sites hourly  3. Every 4-6 hours minimum:  Change oxygen saturation probe site  4. Every 4-6 hours:  If on nasal continuous positive airway pressure, respiratory therapy assess nares and determine need for appliance change or resting period.   9/10/2023 1221 by Shruthi Sewell RN  Outcome: Progressing     Problem: Respiratory - Adult  Goal: Achieves optimal ventilation and oxygenation  Outcome: Progressing     Problem: Cardiovascular - Adult  Goal: Maintains optimal cardiac output and hemodynamic stability  Outcome: Progressing     Problem: Musculoskeletal - Adult  Goal: Return mobility to safest level of function  Outcome: Progressing     Problem: Skin/Tissue Integrity - Adult  Goal: Oral mucous membranes remain intact  Outcome: Progressing     Problem: Skin/Tissue Integrity - Adult  Goal: Skin integrity remains intact  Outcome: Progressing     Problem: Gastrointestinal - Adult  Goal: Maintains or returns to baseline bowel function  Outcome: Progressing

## 2023-09-10 NOTE — CONSULTS
Visit us at [facility]. com or call us at [address] NOTE    Patient: Janes Rain MRN: [id number]     YOB: 1945  Age: 69 y.o. Sex: female    Unit: [facility] Room/Bed: W0P-8586/5122-01 Location: [address] [place] [address]     Admitting Physician: Julianna Sierra    Primary Care Physician: Aishwarya Pollock MD          LOS: 8 days       DATE OF CONSULTATION:   September 9, 2023      SOURCE:   History obtained from patient and EHR      REASON FOR CONSULTATION:   I was asked to see Ms Мария Reeves in consultation by Dr Lizbeth Ayoub for the evaluation and management of mild MARK on CKD4. HISTORY OF PRESENT ILLNESS:   This is a 69 y.o. WF with CKD3b/4 and chronic respiratory failure on home O2, who presented on 9/1/23 with worsening LE edema and increasing SOB. She was also noted by her  to be lethargic. She was treated with IV lasix for ADHF. She is in -10.7L fluid balance, yet her recorded weight is almost unchanged. She is on Jardiance and IV lasix at 60mg bid. Her recent baseline SCr has been in 1.7-2 range. She is currently followed by Dr Juan Carlos Chacon. Her SCr was 1.7 on admission and increased to 2.5 then decreased and leveled at 2.1-2.2. She is sitting comfortably in bed, states that her SOB is at baseline. PAST MEDICAL HISTORY:   CKD3b/4  Severe COPD/chronic hypoxic-hypercapnic resp failure  CAD  HTN  Afib  Pulmonary HTN  cLVH  F5TZ/IVTMDEY diastolic HF s/p cardiomems implant in 11/22  Anemia  MBD of CKD  Hyperlipidemia  AAA      SOCIAL HISTORY:   Denies any tobacco or ETOH use. FAMILY HISTORY:   No h/o kidney disease in the family.       REVIEW OF SYSTEMS:   CONSTITUTIONAL: Fever (-), Chills (-), Weakness (+), Change in appetite (+; decreased), Wt changes (-)  CARDIOVASCULAR: Chest pain (-), Palpitation (-), Edema (+)  RESPIRATORY: LI (++), Orthopnea (+/-), Cough (-)  GASTROINTESTINAL: Nausea (-), Vomiting (-), Diarrhea (-), Constipation (-),

## 2023-09-10 NOTE — PROGRESS NOTES
No acute changes overnight. Pt refused to be turned. Pt educated on importance of turning q2h to prevent skin breakdown. Pt verbalized understanding but, continues to r/f to be repositioned.     Electronically signed by Marta Whatley RN on 9/10/2023 at 7:04 AM

## 2023-09-11 LAB
ANION GAP SERPL CALCULATED.3IONS-SCNC: 13 MMOL/L (ref 3–16)
ANISOCYTOSIS BLD QL SMEAR: ABNORMAL
BASOPHILS # BLD: 0 K/UL (ref 0–0.2)
BASOPHILS NFR BLD: 0 %
BUN SERPL-MCNC: 66 MG/DL (ref 7–20)
CALCIUM SERPL-MCNC: 8.6 MG/DL (ref 8.3–10.6)
CHLORIDE SERPL-SCNC: 94 MMOL/L (ref 99–110)
CO2 SERPL-SCNC: 30 MMOL/L (ref 21–32)
CREAT SERPL-MCNC: 1.9 MG/DL (ref 0.6–1.2)
DEPRECATED RDW RBC AUTO: 17.4 % (ref 12.4–15.4)
EOSINOPHIL # BLD: 0 K/UL (ref 0–0.6)
EOSINOPHIL NFR BLD: 0 %
GFR SERPLBLD CREATININE-BSD FMLA CKD-EPI: 27 ML/MIN/{1.73_M2}
GLUCOSE SERPL-MCNC: 87 MG/DL (ref 70–99)
HCT VFR BLD AUTO: 28.5 % (ref 36–48)
HGB BLD-MCNC: 9.2 G/DL (ref 12–16)
LYMPHOCYTES # BLD: 1.6 K/UL (ref 1–5.1)
LYMPHOCYTES NFR BLD: 9 %
MCH RBC QN AUTO: 28.3 PG (ref 26–34)
MCHC RBC AUTO-ENTMCNC: 32.4 G/DL (ref 31–36)
MCV RBC AUTO: 87.4 FL (ref 80–100)
MONOCYTES # BLD: 1.4 K/UL (ref 0–1.3)
MONOCYTES NFR BLD: 8 %
MYELOCYTES NFR BLD MANUAL: 1 %
NEUTROPHILS # BLD: 14.9 K/UL (ref 1.7–7.7)
NEUTROPHILS NFR BLD: 82 %
PLATELET # BLD AUTO: 239 K/UL (ref 135–450)
PLATELET BLD QL SMEAR: ADEQUATE
PMV BLD AUTO: 9.2 FL (ref 5–10.5)
POIKILOCYTOSIS BLD QL SMEAR: ABNORMAL
POTASSIUM SERPL-SCNC: 3.8 MMOL/L (ref 3.5–5.1)
RBC # BLD AUTO: 3.26 M/UL (ref 4–5.2)
SLIDE REVIEW: ABNORMAL
SODIUM SERPL-SCNC: 137 MMOL/L (ref 136–145)
VANCOMYCIN SERPL-MCNC: 16.8 UG/ML
WBC # BLD AUTO: 18 K/UL (ref 4–11)

## 2023-09-11 PROCEDURE — 99232 SBSQ HOSP IP/OBS MODERATE 35: CPT | Performed by: INTERNAL MEDICINE

## 2023-09-11 PROCEDURE — 2700000000 HC OXYGEN THERAPY PER DAY

## 2023-09-11 PROCEDURE — 6360000002 HC RX W HCPCS: Performed by: REGISTERED NURSE

## 2023-09-11 PROCEDURE — 36415 COLL VENOUS BLD VENIPUNCTURE: CPT

## 2023-09-11 PROCEDURE — 2060000000 HC ICU INTERMEDIATE R&B

## 2023-09-11 PROCEDURE — 6360000002 HC RX W HCPCS: Performed by: INTERNAL MEDICINE

## 2023-09-11 PROCEDURE — 6370000000 HC RX 637 (ALT 250 FOR IP): Performed by: INTERNAL MEDICINE

## 2023-09-11 PROCEDURE — 80048 BASIC METABOLIC PNL TOTAL CA: CPT

## 2023-09-11 PROCEDURE — 99233 SBSQ HOSP IP/OBS HIGH 50: CPT | Performed by: INTERNAL MEDICINE

## 2023-09-11 PROCEDURE — 94761 N-INVAS EAR/PLS OXIMETRY MLT: CPT

## 2023-09-11 PROCEDURE — 6370000000 HC RX 637 (ALT 250 FOR IP): Performed by: HOSPITALIST

## 2023-09-11 PROCEDURE — 80202 ASSAY OF VANCOMYCIN: CPT

## 2023-09-11 PROCEDURE — 97530 THERAPEUTIC ACTIVITIES: CPT | Performed by: PHYSICAL THERAPIST

## 2023-09-11 PROCEDURE — 85025 COMPLETE CBC W/AUTO DIFF WBC: CPT

## 2023-09-11 PROCEDURE — 2580000003 HC RX 258: Performed by: HOSPITALIST

## 2023-09-11 PROCEDURE — 94640 AIRWAY INHALATION TREATMENT: CPT

## 2023-09-11 PROCEDURE — 2580000003 HC RX 258: Performed by: INTERNAL MEDICINE

## 2023-09-11 RX ORDER — PREDNISONE 20 MG/1
20 TABLET ORAL DAILY
Status: DISCONTINUED | OUTPATIENT
Start: 2023-09-11 | End: 2023-09-12

## 2023-09-11 RX ORDER — CLINDAMYCIN HYDROCHLORIDE 150 MG/1
300 CAPSULE ORAL EVERY 8 HOURS SCHEDULED
Status: DISCONTINUED | OUTPATIENT
Start: 2023-09-11 | End: 2023-09-17 | Stop reason: HOSPADM

## 2023-09-11 RX ADMIN — NYSTATIN 500000 UNITS: 100000 SUSPENSION ORAL at 14:07

## 2023-09-11 RX ADMIN — LABETALOL HYDROCHLORIDE 5 MG: 5 INJECTION, SOLUTION INTRAVENOUS at 01:22

## 2023-09-11 RX ADMIN — OXYCODONE HYDROCHLORIDE AND ACETAMINOPHEN 500 MG: 500 TABLET ORAL at 08:51

## 2023-09-11 RX ADMIN — MOMETASONE FUROATE AND FORMOTEROL FUMARATE DIHYDRATE 2 PUFF: 200; 5 AEROSOL RESPIRATORY (INHALATION) at 08:02

## 2023-09-11 RX ADMIN — DOFETILIDE 125 MCG: 0.12 CAPSULE ORAL at 21:37

## 2023-09-11 RX ADMIN — EMPAGLIFLOZIN 10 MG: 10 TABLET, FILM COATED ORAL at 08:51

## 2023-09-11 RX ADMIN — CLONIDINE HYDROCHLORIDE 0.1 MG: 0.1 TABLET ORAL at 15:00

## 2023-09-11 RX ADMIN — PANTOPRAZOLE SODIUM 40 MG: 40 TABLET, DELAYED RELEASE ORAL at 06:22

## 2023-09-11 RX ADMIN — NYSTATIN 500000 UNITS: 100000 SUSPENSION ORAL at 18:22

## 2023-09-11 RX ADMIN — RANOLAZINE 500 MG: 500 TABLET, FILM COATED, EXTENDED RELEASE ORAL at 08:51

## 2023-09-11 RX ADMIN — POTASSIUM CHLORIDE 40 MEQ: 1500 TABLET, EXTENDED RELEASE ORAL at 08:51

## 2023-09-11 RX ADMIN — CLINDAMYCIN IN 5 PERCENT DEXTROSE 600 MG: 12 INJECTION, SOLUTION INTRAVENOUS at 14:12

## 2023-09-11 RX ADMIN — PREDNISONE 20 MG: 20 TABLET ORAL at 08:55

## 2023-09-11 RX ADMIN — IPRATROPIUM BROMIDE AND ALBUTEROL SULFATE 1 DOSE: 2.5; .5 SOLUTION RESPIRATORY (INHALATION) at 12:12

## 2023-09-11 RX ADMIN — ISOSORBIDE MONONITRATE 120 MG: 60 TABLET, EXTENDED RELEASE ORAL at 08:51

## 2023-09-11 RX ADMIN — VANCOMYCIN HYDROCHLORIDE 750 MG: 750 INJECTION, POWDER, LYOPHILIZED, FOR SOLUTION INTRAVENOUS at 15:04

## 2023-09-11 RX ADMIN — FERROUS SULFATE TAB EC 324 MG (65 MG FE EQUIVALENT) 324 MG: 324 (65 FE) TABLET DELAYED RESPONSE at 18:22

## 2023-09-11 RX ADMIN — ROSUVASTATIN CALCIUM 10 MG: 10 TABLET, FILM COATED ORAL at 08:51

## 2023-09-11 RX ADMIN — CLINDAMYCIN IN 5 PERCENT DEXTROSE 600 MG: 12 INJECTION, SOLUTION INTRAVENOUS at 06:22

## 2023-09-11 RX ADMIN — RIVAROXABAN 15 MG: 15 TABLET, FILM COATED ORAL at 08:51

## 2023-09-11 RX ADMIN — FERROUS SULFATE TAB EC 324 MG (65 MG FE EQUIVALENT) 324 MG: 324 (65 FE) TABLET DELAYED RESPONSE at 08:51

## 2023-09-11 RX ADMIN — CARVEDILOL 6.25 MG: 6.25 TABLET, FILM COATED ORAL at 08:51

## 2023-09-11 RX ADMIN — MOMETASONE FUROATE AND FORMOTEROL FUMARATE DIHYDRATE 2 PUFF: 200; 5 AEROSOL RESPIRATORY (INHALATION) at 20:28

## 2023-09-11 RX ADMIN — RANOLAZINE 500 MG: 500 TABLET, FILM COATED, EXTENDED RELEASE ORAL at 21:36

## 2023-09-11 RX ADMIN — CARVEDILOL 6.25 MG: 6.25 TABLET, FILM COATED ORAL at 18:22

## 2023-09-11 RX ADMIN — HYDRALAZINE HYDROCHLORIDE 50 MG: 50 TABLET, FILM COATED ORAL at 06:22

## 2023-09-11 RX ADMIN — IPRATROPIUM BROMIDE AND ALBUTEROL SULFATE 1 DOSE: 2.5; .5 SOLUTION RESPIRATORY (INHALATION) at 08:02

## 2023-09-11 RX ADMIN — NYSTATIN 500000 UNITS: 100000 SUSPENSION ORAL at 08:51

## 2023-09-11 RX ADMIN — HYDRALAZINE HYDROCHLORIDE 50 MG: 50 TABLET, FILM COATED ORAL at 21:36

## 2023-09-11 RX ADMIN — CLONIDINE HYDROCHLORIDE 0.1 MG: 0.1 TABLET ORAL at 21:36

## 2023-09-11 RX ADMIN — IPRATROPIUM BROMIDE AND ALBUTEROL SULFATE 1 DOSE: 2.5; .5 SOLUTION RESPIRATORY (INHALATION) at 17:15

## 2023-09-11 RX ADMIN — HYDRALAZINE HYDROCHLORIDE 50 MG: 50 TABLET, FILM COATED ORAL at 14:07

## 2023-09-11 RX ADMIN — DOFETILIDE 125 MCG: 0.12 CAPSULE ORAL at 08:51

## 2023-09-11 RX ADMIN — CLINDAMYCIN HYDROCHLORIDE 300 MG: 150 CAPSULE ORAL at 21:36

## 2023-09-11 RX ADMIN — SODIUM CHLORIDE 5 ML: 9 INJECTION, SOLUTION INTRAVENOUS at 14:11

## 2023-09-11 RX ADMIN — POLYETHYLENE GLYCOL 3350 17 G: 17 POWDER, FOR SOLUTION ORAL at 10:54

## 2023-09-11 NOTE — PROGRESS NOTES
Pt has had good UOP this shift with lasix gtt. Pt wt has not decreased from previous wt. Pt compliant with fluid restriction.     Electronically signed by Ramonita Lazo RN on 9/11/2023 at 3:57 AM

## 2023-09-11 NOTE — PROGRESS NOTES
[address] [facility]   Daily Progress Note      Admit Date:  9/1/2023    CC: SOB    HPI:   [place] is a 69 y.o. female with PMH  CAD, AF s/p PVI ablation 10/2020- recurrent AF with DCCV 1/2021 and repeat AF ablation 2/2021 (failed flecaininde), HTN, aortic aneurysm, AAA s/p repair 3/2018 (Dr. Frances Silvestre, DENISE (intolerant to CPAP). Early Mode stress>[facility] 3/2018 revealed  of RCA and non obstructive dz of LAD and LCX. Adm to [facility] 5/2022 for dofetilide loading, converted to NSR. Abn Stress followed by [facility] 9/20/2022 showed CAD. Multiple HF/hypoxic resp failure hospitalizations/ re admissions over last year. S/P Cardiomems PA sensor implant 11/2022. Non complaint with readings. GIB 4/2023 & 5/2023 requiring multiple units of PRBC>EGD with gastric AVMs. Admitted 6/25-7/2/2023 AMS, found to have UTI. Mentation improved with treatment of UTI. Cardiology consulted and hypervolemic. IV Lasix was started with improvement in SOB, bloating and edema. Orthopnea. 4L NC     Presented to Dr. Ponce Smith office with SOB and edema. Sent to ED for decompensated HF. Initial Cardiomems PAD elevated 35. Started on Lasix IV and metolazone- diuresing. Now with Left neck swelling and pain-CT scan revealed parotiditis- improving  She is lethargic but arouses and says he r breathing and swelling slightly improved. However, now on 4 L of nasal cannula with increased bilateral lower extremity swelling  BP has been elevated. Interval history 9/8/2023  -Had modest diuresis with IV Lasix. Weight remains unchanged  -Tenderness on the right side of her face is improving  -Remains on 4 L of oxygen which is her home requirement  -Lower extremity edema persist  -Heart rate remains in mid 50s and start of reducing her Tikosyn    Interval history 9/11/2023  -Patient continues to show improvement. Denies any significant shortness of breath. She was started on Lasix drip by nephrology over the weekend.   She is diuresing well though

## 2023-09-11 NOTE — PLAN OF CARE
Problem: Discharge Planning  Goal: Discharge to home or other facility with appropriate resources  9/11/2023 1017 by Moody Herzog RN  Outcome: Progressing  Flowsheets (Taken 9/11/2023 0847)  Discharge to home or other facility with appropriate resources: Identify barriers to discharge with patient and caregiver     Problem: Safety - Adult  Goal: Free from fall injury  9/11/2023 1017 by Moody Herzog RN  Outcome: Progressing     Problem: ABCDS Injury Assessment  Goal: Absence of physical injury  9/11/2023 1017 by Moody Herzog RN  Outcome: Progressing     Problem: Skin/Tissue Integrity  Goal: Absence of new skin breakdown  Description: 1. Monitor for areas of redness and/or skin breakdown  2. Assess vascular access sites hourly  3. Every 4-6 hours minimum:  Change oxygen saturation probe site  4. Every 4-6 hours:  If on nasal continuous positive airway pressure, respiratory therapy assess nares and determine need for appliance change or resting period.   9/11/2023 1017 by Moody Herzog RN  Outcome: Progressing     Problem: Respiratory - Adult  Goal: Achieves optimal ventilation and oxygenation  Outcome: Progressing  Flowsheets (Taken 9/11/2023 0847)  Achieves optimal ventilation and oxygenation: Assess for changes in respiratory status     Problem: Cardiovascular - Adult  Goal: Maintains optimal cardiac output and hemodynamic stability  Outcome: Progressing     Problem: Cardiovascular - Adult  Goal: Absence of cardiac dysrhythmias or at baseline  Outcome: Progressing     Problem: Skin/Tissue Integrity - Adult  Goal: Skin integrity remains intact  Outcome: Progressing     Problem: Skin/Tissue Integrity - Adult  Goal: Oral mucous membranes remain intact  Outcome: Progressing     Problem: Musculoskeletal - Adult  Goal: Return mobility to safest level of function  Outcome: Progressing     Problem: Musculoskeletal - Adult  Goal: Return ADL status to a safe level of function  Outcome: Progressing     Problem:

## 2023-09-11 NOTE — CARE COORDINATION
DISCHARGE PLANNING:    Per chart review and rounds, patient still requiring diuresing. On baseline amount of oxygen at 4 lpm.  DC plan remian for patient to return home with her . [address] [place] [address] is active. Oxygen through [employer] at home.     [phone]  Electronically signed by Christopher Puentes RN on 9/11/2023 at 3:02 PM

## 2023-09-11 NOTE — PROGRESS NOTES
CONTRAST    Narrative  EXAMINATION:  CT OF THE CHEST WITHOUT CONTRAST 3/26/2023 10:34 pm    TECHNIQUE:  CT of the chest was performed without the administration of intravenous  contrast. Multiplanar reformatted images are provided for review. Automated  exposure control, iterative reconstruction, and/or weight based adjustment of  the mA/kV was utilized to reduce the radiation dose to as low as reasonably  achievable. COMPARISON:  None. HISTORY:  ORDERING SYSTEM PROVIDED HISTORY: SOB  TECHNOLOGIST PROVIDED HISTORY:  Reason for exam:->SOB  Decision Support Exception - unselect if not a suspected or confirmed  emergency medical condition->Emergency Medical Condition (MA)  Reason for Exam: SOB    FINDINGS:  Mediastinum: Thyroid is prominent. Small nodule on the left which measures  approximately 9.5 mm x 8.2 mm. Atherosclerotic changes of the aorta and  great vessels. No aneurysm the the descending thoracic aorta is upper limits  of normal 3.4 cm. Heart size is enlarged. Dense coronary artery  calcifications. Multiple small mediastinal lymph nodes. Lungs/pleura: Mild interseptal thickening. No consolidation. Tiny right  pleural effusion. No consolidation. Minimal atelectasis in the lingula    Upper Abdomen: Incompletely evaluated probable abdominal aortic stent. Mild  thickening of the distal esophagus. Soft Tissues/Bones: Spondylosis. Impression  1. Mild vascular congestion. CTPA: No results found for this or any previous visit. CXR PA/LAT: Results for orders placed during the hospital encounter of 06/25/23    XR CHEST (2 VW)    Narrative  EXAMINATION:  TWO XRAY VIEWS OF THE CHEST    6/25/2023 2:44 pm    COMPARISON:  02/20/2023    HISTORY:  Acute shortness of breath and altered mental status. FINDINGS:  Patient is mildly rotated. Stable cardiomegaly. Small right pleural  effusion. No acute airspace disease. No pneumothorax. Osteopenia.     Impression  Small right pleural effusion. CXR portable: Results for orders placed during the hospital encounter of 09/01/23    XR CHEST PORTABLE    Narrative  EXAMINATION:  ONE XRAY VIEW OF THE CHEST    9/1/2023 4:42 pm    COMPARISON:  None. HISTORY:  ORDERING SYSTEM PROVIDED HISTORY: cp  TECHNOLOGIST PROVIDED HISTORY:  Reason for exam:->cp  Reason for Exam: Pt. Was at Dr. Kathy Pastor office for LIFESCAPE, sent  to ED    FINDINGS:  Normal cardiomediastinal silhouette. No acute airspace infiltrate. No  pneumothorax or pleural effusion    Impression  No acute cardiopulmonary findings             This note was transcribed using [employer]. Please disregard any translational errors.       [facility], [facility] and [facility]

## 2023-09-11 NOTE — PROGRESS NOTES
pallor, no icterus  on nasal cannula, chronic ill appearing woman ++   Skin: warm and dry, no rash or erythema  Head: normocephalic and atraumatic  Eyes: pupils equal, round, and reactive to light, conjunctivae normal  ENT: Left parotid gland swollen  less tender ++  improving    Neck: supple and non-tender without mass, no thyromegaly  no cervical lymphadenopathy  Pulmonary/Chest:  Bi basal crepts++ - no wheezes, rales or rhonchi, normal air movement, in some  respiratory distress  Cardiovascular: normal rate, regular rhythm, normal S1 and S2, no murmurs, rubs, clicks, or gallops, no carotid bruits  Abdomen: soft, non-tender, non-distended, normal bowel sounds, no masses or organomegaly  Extremities: no cyanosis, clubbing or edema  Musculoskeletal: normal range of motion, no joint swelling, deformity or tenderness  Integumentary: No rashes, no abnormal skin lesions, no petechiae  Neurologic: reflexes normal and symmetric, no cranial nerve deficit  Psych:  Orientation, sensorium, mood normal   Lines: IV    DATA:    CBC:   Lab Results   Component Value Date    WBC 16.2 (H) 09/08/2023    HGB 9.4 (L) 09/08/2023    HCT 28.6 (L) 09/08/2023    MCV 86.9 09/08/2023     09/08/2023     RENAL:   Lab Results   Component Value Date    CREATININE 2.0 (H) 09/10/2023    BUN 64 (H) 09/10/2023     09/10/2023    K 3.9 09/10/2023    CL 99 09/10/2023    CO2 32 09/10/2023     SED RATE: No results found for: \"SEDRATE\"  CK:   Lab Results   Component Value Date/Time    CKTOTAL 22 04/25/2023 07:23 AM     CRP: No results found for: \"CRP\"  Hepatic Function Panel:   Lab Results   Component Value Date/Time    ALKPHOS 86 09/01/2023 04:45 PM    ALT 7 09/01/2023 04:45 PM    AST 20 09/01/2023 04:45 PM    PROT 6.6 09/01/2023 04:45 PM    PROT 7.9 07/14/2016 01:03 PM    BILITOT 0.5 09/01/2023 04:45 PM    BILIDIR <0.2 07/14/2015 07:24 AM    IBILI see below 07/14/2015 07:24 AM    LABALBU 3.8 09/01/2023 04:45 PM     UA:  Lab Results flush, sodium chloride, ondansetron **OR** ondansetron, polyethylene glycol, acetaminophen **OR** acetaminophen    Imaging:   CT SOFT TISSUE NECK WO CONTRAST   Final Result   1. Findings consistent with an acute left parotiditis. No abscess or   sialolith is identified. 2. Edematous appearance of the epiglottis and aryepiglottic folds with   associated retropharyngeal edema and moderate supraglottic airway narrowing. The findings are suggestive of acute epiglottitis. The findings were sent to the Radiology Results [address] at 2:23   pm on 9/5/2023 to be communicated to a licensed caregiver. US PAROTID   Preliminary Result   Complex collection adjacent to the right parotid gland may represent an   abscess. Cross-sectional imaging with C2 neck with contrast may be helpful   for further evaluation. XR CHEST PORTABLE   Final Result   No acute cardiopulmonary findings             All pertinent images and reports for the current Hospitalization were reviewed by me.     IMPRESSION:    Patient Active Problem List   Diagnosis Code    Essential hypertension I10    Hyperlipidemia E78.5    Coronary atherosclerosis due to calcified coronary lesion of native artery I25.10, I25.84    DENISE (obstructive sleep apnea) G47.33    History of DVT (deep vein thrombosis) Z86.718    Family history of DVT Z82.49    AAA (abdominal aortic aneurysm) without rupture ([facility]) I71.40    Pleural effusion, left J90    Pleural effusion J90    COPD exacerbation ([facility]) J44.1    Pelvic pain in female R10.2    Vitamin D deficiency E55.9    Other emphysema ([facility]) J43.8    Acute bronchitis J20.9    Acute respiratory failure with hypoxia ([facility]) J96.01    Abnormal CXR R93.89    Atypical pneumonia J18.9    Atrial fibrillation with RVR ([facility]) I48.91    Chronic diastolic (congestive) heart failure ([facility]) I50.32    PVD (peripheral vascular disease) ([facility]) I73.9    Abnormal nuclear stress test R94.39    AAA (abdominal aortic aneurysm)

## 2023-09-11 NOTE — PROGRESS NOTES
Impression: Age-related nuclear cataract, bilateral Plan: Not visually significant. RTC if vision changes. agreeable to working with therapy         Social/Functional History  Social/Functional History  Lives With: Spouse  Type of Home: Mobile home  Home Layout: One level  Home Access: Stairs to enter without rails  Entrance Stairs - Number of Steps: 1 small step  Bathroom Shower/Tub: Tub/Shower unit (walk in tub)  Bathroom Toilet: Bedside commode  Bathroom Equipment: Built-in shower seat, Hand-held shower, 3-in-1 commode, Grab bars in shower  Home Equipment: Devan Bethany, 4 wheeled, Oxygen, Wheelchair-manual, Lift chair  ADL Assistance: Needs assistance  Homemaking Assistance:  (spouse completes all IADLs)  Ambulation Assistance: Independent (short distances with 7KA)  Transfer Assistance: Independent  Active : No  IADL Comments: Primarily sleeps in recliner  Additional Comments: Pt with multiple hospital admissions this year- most recently 7/31-8/9 and discharged home. Vision/Hearing  Vision  Vision: Within Functional Limits  Hearing  Hearing: Within functional limits (slightly [place])    Cognition   Orientation  Overall Orientation Status: Within Functional Limits  Cognition  Overall Cognitive Status: WFL  Cognition Comment: very anxious and self limiting at times     Objective      Bed mobility  Supine to Sit: Moderate assistance  Bed Mobility Comments: pt up in chair at end of session  Transfers  Sit to Stand: Minimal Assistance; Moderate Assistance  Stand to Sit: Minimal Assistance; Moderate Assistance  Stand Pivot Transfers: Minimal Assistance (with RW)  Comment: transferred EOB to [facility]; transferred [facility] to recliner chair        Balance  Comments: CGA/SBA for sitting EOB; CGA/min A for static standing with RW, Pt only able to stand for approx 20-30 sec each stand; needed to stand several times as initially stood at EOB and gonzalez pad slightly soiled; began cleansing pt but getting a lot of stool despite pt reported she didn't have to have a BM; after several stands and stool still abundant, transferred to [facility] where pt had a large BM; needed again to stand several times to clean pt with barrier wipes and mayank adult brief; positioned in chair at end of session with LEs elevated and all needs in reach with pillows placed for support           OutComes Score                                                  AM-PAC Score  AM-PAC Inpatient Mobility Raw Score : 15 (09/07/23 1423)  AM-PAC Inpatient T-Scale Score : 39.45 (09/07/23 1423)  Mobility Inpatient CMS 0-100% Score: 57.7 (09/07/23 1423)  Mobility Inpatient CMS G-Code Modifier : CK (09/07/23 1423)          Tinneti Score       Goals  Short Term Goals  Time Frame for Short Term Goals: by discharge (all ongoing)  Short Term Goal 1: bed mob SBA  Short Term Goal 2: transfers SBA  Short Term Goal 3: amb 22' with RW SBA  Patient Goals   Patient Goals : to return home       Education  Patient Education  Education Given To: Patient  Education Provided Comments: reviewed call light and not getting up without assist  Education Method: Verbal  Education Outcome: Verbalized understanding;Continued education needed      Therapy Time   Individual Concurrent Group Co-treatment   Time In 1420         Time Out 1530         Minutes 70                 SHARYN HILLMAN, PT     Electronically signed by SHARYN HILLMAN PT on 9/11/2023 at 3:32 PM

## 2023-09-12 LAB
ANION GAP SERPL CALCULATED.3IONS-SCNC: 17 MMOL/L (ref 3–16)
ANION GAP SERPL CALCULATED.3IONS-SCNC: 9 MMOL/L (ref 3–16)
ANISOCYTOSIS BLD QL SMEAR: ABNORMAL
BASOPHILS # BLD: 0 K/UL (ref 0–0.2)
BASOPHILS NFR BLD: 0 %
BUN SERPL-MCNC: 62 MG/DL (ref 7–20)
BUN SERPL-MCNC: 67 MG/DL (ref 7–20)
CALCIUM SERPL-MCNC: 9.1 MG/DL (ref 8.3–10.6)
CALCIUM SERPL-MCNC: 9.2 MG/DL (ref 8.3–10.6)
CHLORIDE SERPL-SCNC: 92 MMOL/L (ref 99–110)
CHLORIDE SERPL-SCNC: 97 MMOL/L (ref 99–110)
CO2 SERPL-SCNC: 29 MMOL/L (ref 21–32)
CO2 SERPL-SCNC: 34 MMOL/L (ref 21–32)
CREAT SERPL-MCNC: 2 MG/DL (ref 0.6–1.2)
CREAT SERPL-MCNC: 2.1 MG/DL (ref 0.6–1.2)
CRP SERPL-MCNC: 3.7 MG/L (ref 0–5.1)
DACRYOCYTES BLD QL SMEAR: ABNORMAL
DEPRECATED RDW RBC AUTO: 17.7 % (ref 12.4–15.4)
EOSINOPHIL # BLD: 0.2 K/UL (ref 0–0.6)
EOSINOPHIL NFR BLD: 1 %
ERYTHROCYTE [SEDIMENTATION RATE] IN BLOOD BY WESTERGREN METHOD: 37 MM/HR (ref 0–30)
GFR SERPLBLD CREATININE-BSD FMLA CKD-EPI: 24 ML/MIN/{1.73_M2}
GFR SERPLBLD CREATININE-BSD FMLA CKD-EPI: 25 ML/MIN/{1.73_M2}
GLUCOSE SERPL-MCNC: 112 MG/DL (ref 70–99)
GLUCOSE SERPL-MCNC: 129 MG/DL (ref 70–99)
HCT VFR BLD AUTO: 30.7 % (ref 36–48)
HGB BLD-MCNC: 10.3 G/DL (ref 12–16)
HYPOCHROMIA BLD QL SMEAR: ABNORMAL
LYMPHOCYTES # BLD: 2.4 K/UL (ref 1–5.1)
LYMPHOCYTES NFR BLD: 13 %
MCH RBC QN AUTO: 28.9 PG (ref 26–34)
MCHC RBC AUTO-ENTMCNC: 33.4 G/DL (ref 31–36)
MCV RBC AUTO: 86.4 FL (ref 80–100)
MONOCYTES # BLD: 1.1 K/UL (ref 0–1.3)
MONOCYTES NFR BLD: 6 %
NEUTROPHILS # BLD: 14.6 K/UL (ref 1.7–7.7)
NEUTROPHILS NFR BLD: 74 %
NEUTS BAND NFR BLD MANUAL: 6 % (ref 0–7)
OVALOCYTES BLD QL SMEAR: ABNORMAL
PLATELET # BLD AUTO: 242 K/UL (ref 135–450)
PLATELET BLD QL SMEAR: ADEQUATE
PMV BLD AUTO: 9 FL (ref 5–10.5)
POIKILOCYTOSIS BLD QL SMEAR: ABNORMAL
POLYCHROMASIA BLD QL SMEAR: ABNORMAL
POTASSIUM SERPL-SCNC: 3.7 MMOL/L (ref 3.5–5.1)
POTASSIUM SERPL-SCNC: 4 MMOL/L (ref 3.5–5.1)
PROCALCITONIN SERPL IA-MCNC: 0.14 NG/ML (ref 0–0.15)
RBC # BLD AUTO: 3.55 M/UL (ref 4–5.2)
SLIDE REVIEW: ABNORMAL
SODIUM SERPL-SCNC: 138 MMOL/L (ref 136–145)
SODIUM SERPL-SCNC: 140 MMOL/L (ref 136–145)
VANCOMYCIN SERPL-MCNC: 17.6 UG/ML
WBC # BLD AUTO: 18.3 K/UL (ref 4–11)

## 2023-09-12 PROCEDURE — 85652 RBC SED RATE AUTOMATED: CPT

## 2023-09-12 PROCEDURE — 84145 PROCALCITONIN (PCT): CPT

## 2023-09-12 PROCEDURE — 94760 N-INVAS EAR/PLS OXIMETRY 1: CPT

## 2023-09-12 PROCEDURE — 6360000002 HC RX W HCPCS: Performed by: INTERNAL MEDICINE

## 2023-09-12 PROCEDURE — 6370000000 HC RX 637 (ALT 250 FOR IP): Performed by: HOSPITALIST

## 2023-09-12 PROCEDURE — 99232 SBSQ HOSP IP/OBS MODERATE 35: CPT | Performed by: INTERNAL MEDICINE

## 2023-09-12 PROCEDURE — 80202 ASSAY OF VANCOMYCIN: CPT

## 2023-09-12 PROCEDURE — 2060000000 HC ICU INTERMEDIATE R&B

## 2023-09-12 PROCEDURE — 86140 C-REACTIVE PROTEIN: CPT

## 2023-09-12 PROCEDURE — 2700000000 HC OXYGEN THERAPY PER DAY

## 2023-09-12 PROCEDURE — 2580000003 HC RX 258: Performed by: HOSPITALIST

## 2023-09-12 PROCEDURE — 80048 BASIC METABOLIC PNL TOTAL CA: CPT

## 2023-09-12 PROCEDURE — 2580000003 HC RX 258: Performed by: INTERNAL MEDICINE

## 2023-09-12 PROCEDURE — 94640 AIRWAY INHALATION TREATMENT: CPT

## 2023-09-12 PROCEDURE — 6370000000 HC RX 637 (ALT 250 FOR IP): Performed by: FAMILY MEDICINE

## 2023-09-12 PROCEDURE — 36415 COLL VENOUS BLD VENIPUNCTURE: CPT

## 2023-09-12 PROCEDURE — 6370000000 HC RX 637 (ALT 250 FOR IP): Performed by: INTERNAL MEDICINE

## 2023-09-12 PROCEDURE — 2580000003 HC RX 258

## 2023-09-12 PROCEDURE — 97535 SELF CARE MNGMENT TRAINING: CPT

## 2023-09-12 PROCEDURE — 99233 SBSQ HOSP IP/OBS HIGH 50: CPT | Performed by: INTERNAL MEDICINE

## 2023-09-12 PROCEDURE — 85025 COMPLETE CBC W/AUTO DIFF WBC: CPT

## 2023-09-12 PROCEDURE — 99233 SBSQ HOSP IP/OBS HIGH 50: CPT | Performed by: NURSE PRACTITIONER

## 2023-09-12 PROCEDURE — 97530 THERAPEUTIC ACTIVITIES: CPT

## 2023-09-12 RX ORDER — WATER 1000 ML/1000ML
INJECTION, SOLUTION INTRAVENOUS
Status: COMPLETED
Start: 2023-09-12 | End: 2023-09-12

## 2023-09-12 RX ORDER — IPRATROPIUM BROMIDE AND ALBUTEROL SULFATE 2.5; .5 MG/3ML; MG/3ML
1 SOLUTION RESPIRATORY (INHALATION) EVERY 4 HOURS PRN
Status: DISCONTINUED | OUTPATIENT
Start: 2023-09-12 | End: 2023-09-16

## 2023-09-12 RX ORDER — DIPHENHYDRAMINE HCL 25 MG
25 TABLET ORAL EVERY 6 HOURS PRN
Status: DISCONTINUED | OUTPATIENT
Start: 2023-09-12 | End: 2023-09-17 | Stop reason: HOSPADM

## 2023-09-12 RX ORDER — ACETAZOLAMIDE 500 MG/5ML
250 INJECTION, POWDER, LYOPHILIZED, FOR SOLUTION INTRAVENOUS ONCE
Status: COMPLETED | OUTPATIENT
Start: 2023-09-12 | End: 2023-09-12

## 2023-09-12 RX ADMIN — CARVEDILOL 6.25 MG: 6.25 TABLET, FILM COATED ORAL at 18:13

## 2023-09-12 RX ADMIN — RANOLAZINE 500 MG: 500 TABLET, FILM COATED, EXTENDED RELEASE ORAL at 08:56

## 2023-09-12 RX ADMIN — POTASSIUM CHLORIDE 40 MEQ: 1500 TABLET, EXTENDED RELEASE ORAL at 08:56

## 2023-09-12 RX ADMIN — DIPHENHYDRAMINE HCL 25 MG: 25 TABLET ORAL at 19:07

## 2023-09-12 RX ADMIN — HYDRALAZINE HYDROCHLORIDE 50 MG: 50 TABLET, FILM COATED ORAL at 05:54

## 2023-09-12 RX ADMIN — MOMETASONE FUROATE AND FORMOTEROL FUMARATE DIHYDRATE 2 PUFF: 200; 5 AEROSOL RESPIRATORY (INHALATION) at 20:25

## 2023-09-12 RX ADMIN — CLINDAMYCIN HYDROCHLORIDE 300 MG: 150 CAPSULE ORAL at 21:44

## 2023-09-12 RX ADMIN — RIVAROXABAN 15 MG: 15 TABLET, FILM COATED ORAL at 08:57

## 2023-09-12 RX ADMIN — FUROSEMIDE 5 MG/HR: 10 INJECTION, SOLUTION INTRAMUSCULAR; INTRAVENOUS at 10:34

## 2023-09-12 RX ADMIN — DOFETILIDE 125 MCG: 0.12 CAPSULE ORAL at 08:56

## 2023-09-12 RX ADMIN — PANTOPRAZOLE SODIUM 40 MG: 40 TABLET, DELAYED RELEASE ORAL at 05:54

## 2023-09-12 RX ADMIN — CLINDAMYCIN HYDROCHLORIDE 300 MG: 150 CAPSULE ORAL at 05:54

## 2023-09-12 RX ADMIN — ACETAZOLAMIDE 250 MG: 500 INJECTION, POWDER, LYOPHILIZED, FOR SOLUTION INTRAVENOUS at 12:02

## 2023-09-12 RX ADMIN — DOFETILIDE 125 MCG: 0.12 CAPSULE ORAL at 21:45

## 2023-09-12 RX ADMIN — MOMETASONE FUROATE AND FORMOTEROL FUMARATE DIHYDRATE 2 PUFF: 200; 5 AEROSOL RESPIRATORY (INHALATION) at 07:56

## 2023-09-12 RX ADMIN — ISOSORBIDE MONONITRATE 120 MG: 60 TABLET, EXTENDED RELEASE ORAL at 08:56

## 2023-09-12 RX ADMIN — IPRATROPIUM BROMIDE AND ALBUTEROL SULFATE 1 DOSE: 2.5; .5 SOLUTION RESPIRATORY (INHALATION) at 07:46

## 2023-09-12 RX ADMIN — HYDRALAZINE HYDROCHLORIDE 50 MG: 50 TABLET, FILM COATED ORAL at 21:45

## 2023-09-12 RX ADMIN — CARVEDILOL 6.25 MG: 6.25 TABLET, FILM COATED ORAL at 08:57

## 2023-09-12 RX ADMIN — RANOLAZINE 500 MG: 500 TABLET, FILM COATED, EXTENDED RELEASE ORAL at 21:45

## 2023-09-12 RX ADMIN — ROSUVASTATIN CALCIUM 10 MG: 10 TABLET, FILM COATED ORAL at 08:57

## 2023-09-12 RX ADMIN — FERROUS SULFATE TAB EC 324 MG (65 MG FE EQUIVALENT) 324 MG: 324 (65 FE) TABLET DELAYED RESPONSE at 08:57

## 2023-09-12 RX ADMIN — FERROUS SULFATE TAB EC 324 MG (65 MG FE EQUIVALENT) 324 MG: 324 (65 FE) TABLET DELAYED RESPONSE at 18:13

## 2023-09-12 RX ADMIN — SODIUM CHLORIDE, PRESERVATIVE FREE 10 ML: 5 INJECTION INTRAVENOUS at 21:45

## 2023-09-12 RX ADMIN — CLONIDINE HYDROCHLORIDE 0.1 MG: 0.1 TABLET ORAL at 08:57

## 2023-09-12 RX ADMIN — HYDRALAZINE HYDROCHLORIDE 50 MG: 50 TABLET, FILM COATED ORAL at 15:39

## 2023-09-12 RX ADMIN — EMPAGLIFLOZIN 10 MG: 10 TABLET, FILM COATED ORAL at 08:57

## 2023-09-12 RX ADMIN — CLINDAMYCIN HYDROCHLORIDE 300 MG: 150 CAPSULE ORAL at 15:39

## 2023-09-12 RX ADMIN — WATER 10 ML: 1 INJECTION INTRAMUSCULAR; INTRAVENOUS; SUBCUTANEOUS at 12:09

## 2023-09-12 RX ADMIN — CLONIDINE HYDROCHLORIDE 0.1 MG: 0.1 TABLET ORAL at 21:45

## 2023-09-12 RX ADMIN — OXYCODONE HYDROCHLORIDE AND ACETAMINOPHEN 500 MG: 500 TABLET ORAL at 08:57

## 2023-09-12 NOTE — PROGRESS NOTES
Non-tender. Non-distended. No hernia. Skin: Warm, dry, normal texture and turgor. Lymph: No cervical LAD. No supraclavicular LAD. M/S: / Ext. No cyanosis. No clubbing. No joint deformity. Neuro: CN 2-12 are intact,  no neurologic deficits noted. Labs:   Recent Labs     09/11/23  0822   WBC 18.0*   HGB 9.2*   HCT 28.5*        Recent Labs     09/09/23  1029 09/10/23  0648 09/11/23  0822    141 137   K 3.2* 3.9 3.8   CL 95* 99 94*   CO2 29 32 30   BUN 65* 64* 66*   CREATININE 2.2* 2.0* 1.9*   CALCIUM 9.3 9.1 8.6     No results for input(s): \"AST\", \"ALT\", \"BILIDIR\", \"BILITOT\", \"ALKPHOS\" in the last 72 hours. No results for input(s): \"INR\" in the last 72 hours. No results for input(s): \"CKTOTAL\", \"TROPHS\" in the last 72 hours. Urinalysis:      Lab Results   Component Value Date/Time    NITRU Negative 09/05/2023 07:04 PM    WBCUA 23 09/05/2023 07:04 PM    BACTERIA None Seen 09/05/2023 07:04 PM    RBCUA 1217 09/05/2023 07:04 PM    BLOODU LARGE 09/05/2023 07:04 PM    SPECGRAV 1.011 09/05/2023 07:04 PM    GLUCOSEU 100 09/05/2023 07:04 PM       Radiology:  CT SOFT TISSUE NECK WO CONTRAST   Final Result   1. Findings consistent with an acute left parotiditis. No abscess or   sialolith is identified. 2. Edematous appearance of the epiglottis and aryepiglottic folds with   associated retropharyngeal edema and moderate supraglottic airway narrowing. The findings are suggestive of acute epiglottitis. The findings were sent to the Radiology Results [employer] [facility] at 2:23   pm on 9/5/2023 to be communicated to a licensed caregiver. US PAROTID   Preliminary Result   Complex collection adjacent to the right parotid gland may represent an   abscess. Cross-sectional imaging with C2 neck with contrast may be helpful   for further evaluation.          XR CHEST PORTABLE   Final Result   No acute cardiopulmonary findings             IP CONSULT TO HEART FAILURE NURSE/COORDINATOR  IP CONSULT TO DIETITIAN  IP CONSULT TO PULMONOLOGY  IP CONSULT TO PALLIATIVE CARE  IP CONSULT TO OTOLARYNGOLOGY  PHARMACY TO DOSE VANCOMYCIN  IP CONSULT TO INFECTIOUS DISEASES  IP CONSULT TO NEPHROLOGY    Assessment/Plan:    Active Hospital Problems    Diagnosis     Thrush, oral [B37.0]     MRSA (methicillin resistant Staphylococcus aureus) colonization [Z22.322]     Acute suppurative parotitis [K11.21]     Neutrophilia [D72.9]     Stage 3a chronic kidney disease ([address]) [N18.31]     Class 3 severe obesity due to excess calories with serious comorbidity and body mass index (BMI) of 40.0 to 44.9 in adult ([address]) [E66.01, Z68.41]     Acute congestive heart failure, unspecified heart failure type ([address]) [I50.9]     Non-STEMI (non-ST elevated myocardial infarction) ([address]) [I21.4]     SOB (shortness of breath) [R06.02]     Acute on chronic respiratory failure with hypoxemia ([facility]) [J96.21]        Parotitis  antibiotics  Consult to ENT  Check CT neck, switch abx to cefepime and added vanco, consulted ID, appears improved, await ID recs for duration of abx     Acute on chronic hypercapnic respiratory failure  -On recent discharge within the last month,   she was on 2 to 3 L nasal cannula. Patient is on 4L nasal [place]   Respiratory distress in emergency room  -Tachypnea and hypercapnia      Acute COPD exacerbation  - on duoneb/neg, steroids  - pulmonology is following     Acute on chronic diastolic CHF:  -Per , torsemide was decreased from twice a day to once a day   and patient having lower extremity edema and weight gain  continue Lasix to 60 mg IV twice daily.   Fluid balance -9 L  Cr increasing 1.7 to 1.9-->2.5 - held lasix 2/2 increase in creat  Cardiology is following  Resumed lasix at 40-->60mg IV BID, switched to lasix gtt   - monitor creat improved from 2.2-->1.9     ARF on CKD - consulted nephrology per cardio to help with diuresis, started on lasix gtt     Hypotension - resolved, BP better, resume hydralazine/clonidine,

## 2023-09-12 NOTE — PROGRESS NOTES
close by during ambulation. Discussed with OTR: Pt tolerated tx fairly well. Pt required CG/Min A cues for sit><stands at recline, BSC and for stand-step transfers, using RW. Pt able to stand with CGA for ADL's, for 2 min. Pt anxious with all activity. Pt required Max/dep for ADL, toileting task. Pt with functional UE ROM for self care and anticipate will require up to max A for ADLs. Pt is functioning below baseline and benefit from skilled therapy. Pt reports she plans on returning home with family support at discharge. Prognosis: Fair  REQUIRES OT FOLLOW-UP: Yes  Activity Tolerance  Activity Tolerance: Patient limited by fatigue        Plan   Occupational Therapy Plan  Times Per Week: 3-5  Current Treatment Recommendations: Strengthening, Balance training, Functional mobility training, Endurance training, Self-Care / ADL, Safety education & training, Gait training     Restrictions  Restrictions/Precautions  Restrictions/Precautions: Fall Risk, Contact Precautions  Position Activity Restriction  Other position/activity restrictions: 4 liters O2 via NC (pt baseline) MRSA in nares 9-6-23; IV    Subjective   General  Chart Reviewed: Yes, Orders, Progress Notes, Labs  Patient assessed for rehabilitation services?: Yes  Additional Pertinent Hx: Per H&P: \"72 y. o.female with medical history significant for COPD, severe obstructive sleep apnea chronic hypercapnic respiratory failure CHF atrial fibrillation status post ablation coronary artery disease history of blood clots hypertension hyperlipidemia and abdominal aortic aneurysm status post endovascular repair. Patient also has history of GI bleed recently she is status post ablation of AVM     Patient presented again with gradually worsening lower extremity edema  And gradually worsening shortness of breath. Patient's  reported that patient has been very sleepy and lethargic. Been using 4 L nasal cannula oxygen.   History of fever chills no cough or chest pain.\"  Family / Caregiver Present: Yes (spouse)  Referring Practitioner: Nic Barron MD  Subjective  Subjective: Pt met Bs, in recliner. Pt agreeable to therapy with min encouragement. Pt denied pain. General Comment  Comments: Per RN ok for therapy     Social/Functional History  Social/Functional History  Lives With: Spouse  Type of Home: Mobile home  Home Layout: One level  Home Access: Stairs to enter without rails  Entrance Stairs - Number of Steps: 1 small step  Bathroom Shower/Tub: Tub/Shower unit (walk in tub)  Bathroom Toilet: Bedside commode  Bathroom Equipment: Built-in shower seat, Hand-held shower, 3-in-1 commode, Grab bars in shower  Home Equipment: Papa Fallen, 4 wheeled, Oxygen, Wheelchair-manual, Lift chair  ADL Assistance: Needs assistance  Homemaking Assistance:  (spouse completes all IADLs)  Ambulation Assistance: Independent (short distances with 4XY)  Transfer Assistance: Independent  Active : No  IADL Comments: Primarily sleeps in recliner  Additional Comments: Pt with multiple hospital admissions this year- most recently 7/31-8/9 and discharged home. Objective       Safety Devices  Type of Devices: Call light within reach; Chair alarm in place; Left in chair;Nurse notified;Gait belt;Patient at risk for falls; All fall risk precautions in place     Toilet Transfers  Toilet - Technique: Stand step  Equipment Used: Standard bedside commode  Toilet Transfer: Contact guard assistance;Minimal assistance  Toilet Transfers Comments: RW, ><recliner, BSC     ADL  Grooming: Setup  Grooming Skilled Clinical Factors: prepared wash cloth to wash hands after toileting. Toileting: Maximum assistance;Dependent/Total  Toileting Skilled Clinical Factors: Pt using purewick in chair. Pt incontinent of  urine, BM and voided further on BSC. Pt assisting minimally with hygiene and dependent for depends changed. Pt c/o pain with hygiene and noted blood on wipes-RN notified.   Additional Comments: Anticipate

## 2023-09-12 NOTE — PROGRESS NOTES
Cardiology - PROGRESS NOTE    Admit Date: 9/1/2023     Reason for follow up:   Claudette Fujisawa is a 69 y.o. female with PMH  CAD, AF s/p PVI ablation 10/2020- recurrent AF with DCCV 1/2021 and repeat AF ablation 2/2021 (failed flecaininde), HTN, aortic aneurysm, AAA s/p repair 3/2018 (Dr. Mikel Leavitt, DENISE (intolerant to CPAP). Kathryn Passer stress>[facility] 3/2018 revealed  of RCA and non obstructive dz of LAD and LCX. Adm to [facility] 5/2022 for dofetilide loading, converted to NSR. Abn Stress followed by [address] [place] [address] 9/20/2022 showed CAD. Multiple HF/hypoxic resp failure hospitalizations/ re admissions over last year. S/P Cardiomems PA sensor implant 11/2022. Non complaint with readings. GIB 4/2023 & 5/2023 requiring multiple units of PRBC>EGD with gastric AVMs. Admitted 6/25-7/2/2023 AMS, found to have UTI. Mentation improved with treatment of UTI. Cardiology consulted and hypervolemic. IV Lasix was started with improvement in SOB, bloating and edema. Orthopnea. 4L NC     Presented to Dr. Michelle Roles office with SOB and edema. Sent to ED for decompensated HF. Initial Cardiomems PAD elevated 35. Started on Lasix IV and metolazone- diuresing. Now with Left neck swelling and pain-CT scan revealed parotiditis- improving    Initial CardioMems reading of 35    -14 L     Social History:   reports that she quit smoking about 9 years ago. Her smoking use included cigarettes. She started smoking about 46 years ago. She has a 37.00 pack-year smoking history. She has been exposed to tobacco smoke. She has quit using smokeless tobacco. She reports that she does not drink alcohol and does not use drugs. Family History: family history includes Heart Disease in her father; Hypertension in an other family member.   Living Situation: with spouse       Interval History:   Patient seen and examined and notes reviewed   Wt 231-no wt today   -14.9 L   All other systems reviewed and negative

## 2023-09-12 NOTE — PROGRESS NOTES
Infectious Diseases Inpatient Progress  Note        CHIEF COMPLAINT:     WBC elevation  Left acute parotitis  COPD  CHF  MARK on CKD  MRSA colonization              HISTORY OF PRESENT ILLNESS:  69 y.o. woman with a significant history for abdominal aortic aneurysm, atrial fibrillation, coronary artery disease, congestive heart failure, COPD, hypertension. Status post endovascular repair of the aneurysm admitted to the  hospital secondary to worsening edema shortness of breath. She is currently using 4l  nasal cannula. In [facility]  hospital developed a left Parotid gland swelling pain redness concern pancreatitis. She underwent CT soft tissue of the neck indicated Cellulitis no abscess formation. Labs indicate WBC 31.  Hb at  9.6,  Creat  2.5.,  We are consulted for IV abx  recommendations      Interval History : remains sob and using nasal cannula slowly improving and no chills sitting in the chair and Left parotid gland swelling improving  at bed side      Past Medical History:    Past Medical History:   Diagnosis Date    AAA (abdominal aortic aneurysm) ([address])     pt states it is 4cm    AAA (abdominal aortic aneurysm) without rupture ([facility]) 2/10/2015    Atrial fibrillation ([facility])     CAD (coronary artery disease)     CHF (congestive heart failure) ([facility])     COPD (chronic obstructive pulmonary disease) ([address])     History of blood clots     Hyperlipidemia     Hypertension        Past Surgical History:    Past Surgical History:   Procedure Laterality Date    ABDOMINAL AORTIC ANEURYSM REPAIR      Endovascular abdominal AA    APPENDECTOMY  1990    incidental    BLADDER SUSPENSION      CAPSULE ENDOSCOPY N/A 5/22/2023    ESOPHAGEAL CAPSULE ENDOSCOPY performed by Sara Carrion MD at [address] [place] [address] Right 11/28/2022    Cardiomems PA sensor device implant Left PA    CARDIAC SURGERY      CATARACT REMOVAL      CHOLECYSTECTOMY  10/15/2013    COLECTOMY N/A 5/26/2023    LAPAROSCOPIC ASSISTED (paroxysmal atrial fibrillation) ([facility]) I48.0    Endoleak post (EVAR) endovascular aneurysm repair, sequela [id number]    Carotid artery stenosis without cerebral infarction, bilateral I65.23    History of repair of aneurysm of abdominal aorta using endovascular stent graft Z95.828    Atherosclerotic heart disease of native coronary artery with other forms of angina pectoris ([facility]) I25.118    Morbid obesity due to excess calories ([facility]) E66.01    COPD, severe ([facility]) J44.9    Hypertensive crisis I16.9    Acute on chronic diastolic congestive heart failure ([address]) I50.33    Respiratory failure ([address]) J96.90    Left atrial flutter by electrocardiogram ([address]) I48.92    Persistent atrial fibrillation ([facility]) I48.19    A-fib ([facility]) I48.91    Obesity (BMI 30-39. 9) E66.9    Atrial fibrillation ([facility]) I48.91    LI (dyspnea on exertion) R06.09    Chronic systolic (congestive) heart failure I50.22    Pneumonia J18.9    Acute on chronic respiratory failure with hypoxia ([facility]) J96.21    Drug-induced insomnia ([facility]) F19.982    Atrial tachycardia ([facility]) I47.1    Acute on chronic diastolic heart failure ([facility]) I50.33    Coronary artery disease involving native coronary artery of native heart without angina pectoris I25.10    Acute on chronic respiratory failure ([facility]) J96.20    Congestive heart failure ([facility]) I50.9    Hypokalemia E87.6    Chronic hypercapnic respiratory failure ([facility]) J96.12    Anemia associated with acute blood loss D62    Bradycardia R00.1    Acute on chronic respiratory failure with hypoxemia ([facility]) J96.21    Chronic renal failure, stage 3 (moderate) ([facility]) N18.30    Occult blood positive stool R19.5    CAD (coronary artery disease) I25.10    Acute GI bleeding K92.2    Acute on chronic anemia D64.9    Primary hypertension I10    Acute renal failure superimposed on stage 3a chronic kidney disease ([facility]) N17.9, N18.31    Symptomatic anemia D64.9    GI bleed K92.2    MARK (acute kidney injury) ([address]) N17.9    Dependence on continuous

## 2023-09-12 NOTE — PLAN OF CARE
Problem: Discharge Planning  Goal: Discharge to home or other facility with appropriate resources  Outcome: Progressing     Problem: Safety - Adult  Goal: Free from fall injury  Outcome: Progressing     Problem: ABCDS Injury Assessment  Goal: Absence of physical injury  Outcome: Progressing     Problem: Skin/Tissue Integrity  Goal: Absence of new skin breakdown  Description: 1. Monitor for areas of redness and/or skin breakdown  2. Assess vascular access sites hourly  3. Every 4-6 hours minimum:  Change oxygen saturation probe site  4. Every 4-6 hours:  If on nasal continuous positive airway pressure, respiratory therapy assess nares and determine need for appliance change or resting period.   Outcome: Progressing     Problem: Respiratory - Adult  Goal: Achieves optimal ventilation and oxygenation  Outcome: Progressing     Problem: Cardiovascular - Adult  Goal: Maintains optimal cardiac output and hemodynamic stability  Outcome: Progressing     Problem: Skin/Tissue Integrity - Adult  Goal: Skin integrity remains intact  Outcome: Progressing  Flowsheets (Taken 9/12/2023 0155)  Skin Integrity Remains Intact: Monitor for areas of redness and/or skin breakdown     Problem: Skin/Tissue Integrity - Adult  Goal: Oral mucous membranes remain intact  Outcome: Progressing     Problem: Musculoskeletal - Adult  Goal: Return mobility to safest level of function  Outcome: Progressing     Problem: Musculoskeletal - Adult  Goal: Return ADL status to a safe level of function  Outcome: Progressing     Problem: Gastrointestinal - Adult  Goal: Maintains or returns to baseline bowel function  Outcome: Progressing     Problem: Gastrointestinal - Adult  Goal: Maintains adequate nutritional intake  Outcome: Progressing     Problem: Genitourinary - Adult  Goal: Absence of urinary retention  Outcome: Progressing     Problem: Infection - Adult  Goal: Absence of infection at discharge  Outcome: Progressing     Problem: Metabolic/Fluid and

## 2023-09-13 LAB
ANION GAP SERPL CALCULATED.3IONS-SCNC: 9 MMOL/L (ref 3–16)
BASOPHILS # BLD: 0.1 K/UL (ref 0–0.2)
BASOPHILS NFR BLD: 0.3 %
BUN SERPL-MCNC: 70 MG/DL (ref 7–20)
CALCIUM SERPL-MCNC: 8.9 MG/DL (ref 8.3–10.6)
CHLORIDE SERPL-SCNC: 94 MMOL/L (ref 99–110)
CO2 SERPL-SCNC: 31 MMOL/L (ref 21–32)
CREAT SERPL-MCNC: 2 MG/DL (ref 0.6–1.2)
DEPRECATED RDW RBC AUTO: 17.7 % (ref 12.4–15.4)
EOSINOPHIL # BLD: 0.5 K/UL (ref 0–0.6)
EOSINOPHIL NFR BLD: 2.5 %
GFR SERPLBLD CREATININE-BSD FMLA CKD-EPI: 25 ML/MIN/{1.73_M2}
GLUCOSE SERPL-MCNC: 87 MG/DL (ref 70–99)
HCT VFR BLD AUTO: 30.3 % (ref 36–48)
HGB BLD-MCNC: 10.1 G/DL (ref 12–16)
LYMPHOCYTES # BLD: 1.2 K/UL (ref 1–5.1)
LYMPHOCYTES NFR BLD: 6.7 %
MCH RBC QN AUTO: 29.2 PG (ref 26–34)
MCHC RBC AUTO-ENTMCNC: 33.4 G/DL (ref 31–36)
MCV RBC AUTO: 87.4 FL (ref 80–100)
MONOCYTES # BLD: 1.5 K/UL (ref 0–1.3)
MONOCYTES NFR BLD: 8 %
NEUTROPHILS # BLD: 15.2 K/UL (ref 1.7–7.7)
NEUTROPHILS NFR BLD: 82.5 %
PLATELET # BLD AUTO: 218 K/UL (ref 135–450)
PMV BLD AUTO: 9.3 FL (ref 5–10.5)
POTASSIUM SERPL-SCNC: 3.9 MMOL/L (ref 3.5–5.1)
RBC # BLD AUTO: 3.47 M/UL (ref 4–5.2)
SODIUM SERPL-SCNC: 134 MMOL/L (ref 136–145)
WBC # BLD AUTO: 18.5 K/UL (ref 4–11)

## 2023-09-13 PROCEDURE — 94640 AIRWAY INHALATION TREATMENT: CPT

## 2023-09-13 PROCEDURE — 36415 COLL VENOUS BLD VENIPUNCTURE: CPT

## 2023-09-13 PROCEDURE — 6370000000 HC RX 637 (ALT 250 FOR IP): Performed by: INTERNAL MEDICINE

## 2023-09-13 PROCEDURE — 97530 THERAPEUTIC ACTIVITIES: CPT

## 2023-09-13 PROCEDURE — 97116 GAIT TRAINING THERAPY: CPT | Performed by: PHYSICAL THERAPIST

## 2023-09-13 PROCEDURE — 6370000000 HC RX 637 (ALT 250 FOR IP): Performed by: HOSPITALIST

## 2023-09-13 PROCEDURE — 97530 THERAPEUTIC ACTIVITIES: CPT | Performed by: PHYSICAL THERAPIST

## 2023-09-13 PROCEDURE — 99233 SBSQ HOSP IP/OBS HIGH 50: CPT | Performed by: INTERNAL MEDICINE

## 2023-09-13 PROCEDURE — 2060000000 HC ICU INTERMEDIATE R&B

## 2023-09-13 PROCEDURE — 80048 BASIC METABOLIC PNL TOTAL CA: CPT

## 2023-09-13 PROCEDURE — 94760 N-INVAS EAR/PLS OXIMETRY 1: CPT

## 2023-09-13 PROCEDURE — 2700000000 HC OXYGEN THERAPY PER DAY

## 2023-09-13 PROCEDURE — 85025 COMPLETE CBC W/AUTO DIFF WBC: CPT

## 2023-09-13 PROCEDURE — 2580000003 HC RX 258: Performed by: HOSPITALIST

## 2023-09-13 RX ORDER — FUROSEMIDE 40 MG/1
40 TABLET ORAL 2 TIMES DAILY
Status: DISCONTINUED | OUTPATIENT
Start: 2023-09-13 | End: 2023-09-17 | Stop reason: HOSPADM

## 2023-09-13 RX ORDER — TORSEMIDE 20 MG/1
40 TABLET ORAL DAILY
Status: DISCONTINUED | OUTPATIENT
Start: 2023-09-13 | End: 2023-09-13

## 2023-09-13 RX ADMIN — PANTOPRAZOLE SODIUM 40 MG: 40 TABLET, DELAYED RELEASE ORAL at 06:25

## 2023-09-13 RX ADMIN — FUROSEMIDE 40 MG: 40 TABLET ORAL at 16:23

## 2023-09-13 RX ADMIN — TRAMADOL HYDROCHLORIDE 50 MG: 50 TABLET ORAL at 08:33

## 2023-09-13 RX ADMIN — FERROUS SULFATE TAB EC 324 MG (65 MG FE EQUIVALENT) 324 MG: 324 (65 FE) TABLET DELAYED RESPONSE at 08:38

## 2023-09-13 RX ADMIN — DOFETILIDE 125 MCG: 0.12 CAPSULE ORAL at 16:24

## 2023-09-13 RX ADMIN — ISOSORBIDE MONONITRATE 120 MG: 60 TABLET, EXTENDED RELEASE ORAL at 08:36

## 2023-09-13 RX ADMIN — TIOTROPIUM BROMIDE INHALATION SPRAY 2 PUFF: 3.12 SPRAY, METERED RESPIRATORY (INHALATION) at 08:01

## 2023-09-13 RX ADMIN — SODIUM CHLORIDE, PRESERVATIVE FREE 10 ML: 5 INJECTION INTRAVENOUS at 21:01

## 2023-09-13 RX ADMIN — HYDRALAZINE HYDROCHLORIDE 75 MG: 50 TABLET, FILM COATED ORAL at 21:02

## 2023-09-13 RX ADMIN — SODIUM CHLORIDE, PRESERVATIVE FREE 10 ML: 5 INJECTION INTRAVENOUS at 16:24

## 2023-09-13 RX ADMIN — RANOLAZINE 500 MG: 500 TABLET, FILM COATED, EXTENDED RELEASE ORAL at 21:02

## 2023-09-13 RX ADMIN — RIVAROXABAN 15 MG: 15 TABLET, FILM COATED ORAL at 08:37

## 2023-09-13 RX ADMIN — DOFETILIDE 125 MCG: 0.12 CAPSULE ORAL at 21:01

## 2023-09-13 RX ADMIN — MOMETASONE FUROATE AND FORMOTEROL FUMARATE DIHYDRATE 2 PUFF: 200; 5 AEROSOL RESPIRATORY (INHALATION) at 08:01

## 2023-09-13 RX ADMIN — EMPAGLIFLOZIN 10 MG: 10 TABLET, FILM COATED ORAL at 08:37

## 2023-09-13 RX ADMIN — FERROUS SULFATE TAB EC 324 MG (65 MG FE EQUIVALENT) 324 MG: 324 (65 FE) TABLET DELAYED RESPONSE at 16:23

## 2023-09-13 RX ADMIN — CLINDAMYCIN HYDROCHLORIDE 300 MG: 150 CAPSULE ORAL at 16:22

## 2023-09-13 RX ADMIN — CLONIDINE HYDROCHLORIDE 0.1 MG: 0.1 TABLET ORAL at 08:38

## 2023-09-13 RX ADMIN — POTASSIUM CHLORIDE 40 MEQ: 1500 TABLET, EXTENDED RELEASE ORAL at 08:36

## 2023-09-13 RX ADMIN — ROSUVASTATIN CALCIUM 10 MG: 10 TABLET, FILM COATED ORAL at 08:37

## 2023-09-13 RX ADMIN — FUROSEMIDE 40 MG: 40 TABLET ORAL at 19:00

## 2023-09-13 RX ADMIN — RANOLAZINE 500 MG: 500 TABLET, FILM COATED, EXTENDED RELEASE ORAL at 08:36

## 2023-09-13 RX ADMIN — CLONIDINE HYDROCHLORIDE 0.1 MG: 0.1 TABLET ORAL at 21:02

## 2023-09-13 RX ADMIN — MOMETASONE FUROATE AND FORMOTEROL FUMARATE DIHYDRATE 2 PUFF: 200; 5 AEROSOL RESPIRATORY (INHALATION) at 19:40

## 2023-09-13 RX ADMIN — CARVEDILOL 6.25 MG: 6.25 TABLET, FILM COATED ORAL at 08:37

## 2023-09-13 RX ADMIN — CLINDAMYCIN HYDROCHLORIDE 300 MG: 150 CAPSULE ORAL at 21:02

## 2023-09-13 RX ADMIN — HYDRALAZINE HYDROCHLORIDE 75 MG: 50 TABLET, FILM COATED ORAL at 16:22

## 2023-09-13 RX ADMIN — CARVEDILOL 6.25 MG: 6.25 TABLET, FILM COATED ORAL at 16:24

## 2023-09-13 RX ADMIN — OXYCODONE HYDROCHLORIDE AND ACETAMINOPHEN 500 MG: 500 TABLET ORAL at 08:38

## 2023-09-13 RX ADMIN — CLINDAMYCIN HYDROCHLORIDE 300 MG: 150 CAPSULE ORAL at 06:25

## 2023-09-13 RX ADMIN — HYDRALAZINE HYDROCHLORIDE 50 MG: 50 TABLET, FILM COATED ORAL at 06:25

## 2023-09-13 ASSESSMENT — PAIN SCALES - GENERAL
PAINLEVEL_OUTOF10: 0
PAINLEVEL_OUTOF10: 4
PAINLEVEL_OUTOF10: 0
PAINLEVEL_OUTOF10: 0

## 2023-09-13 ASSESSMENT — PAIN DESCRIPTION - ORIENTATION: ORIENTATION: MID

## 2023-09-13 ASSESSMENT — PAIN SCALES - WONG BAKER: WONGBAKER_NUMERICALRESPONSE: 0

## 2023-09-13 ASSESSMENT — PAIN - FUNCTIONAL ASSESSMENT: PAIN_FUNCTIONAL_ASSESSMENT: PREVENTS OR INTERFERES SOME ACTIVE ACTIVITIES AND ADLS

## 2023-09-13 ASSESSMENT — PAIN DESCRIPTION - DESCRIPTORS: DESCRIPTORS: DISCOMFORT

## 2023-09-13 ASSESSMENT — PAIN DESCRIPTION - LOCATION: LOCATION: NECK

## 2023-09-13 NOTE — CONSULTS
Consult received. Park Harrington and her  are well known  to heart failure RNs, . Have been meeting with them throughout this stay. Will see again today, and provide heart failure measuring cup, to help reinforce her fluid restriction.  And to review importance of keeping up with her Cardiomems monitoring at home

## 2023-09-13 NOTE — PROGRESS NOTES
Occupational Therapy  Facility/Department: [facility] 5W PROGRESSIVE CARE  Occupational Therapy Daily Note  This note to serve as discharge summary if pt d/c'd prior to next session    Name: Martínez Vivar  : 1945  MRN: [id number]  Date of Service: 2023    Discharge Recommendations:  24 hour supervision or assist          Patient Diagnosis(es): The primary encounter diagnosis was Non-STEMI (non-ST elevated myocardial infarction) ([address]). Diagnoses of Chronic renal failure, stage 3b ([address]) and Acute on chronic congestive heart failure, unspecified heart failure type [facility]) were also pertinent to this visit. Past Medical History:  has a past medical history of AAA (abdominal aortic aneurysm) ([address]), AAA (abdominal aortic aneurysm) without rupture (HCC), Atrial fibrillation ([address]), CAD (coronary artery disease), CHF (congestive heart failure) ([address]), COPD (chronic obstructive pulmonary disease) ([address]), History of blood clots, Hyperlipidemia, and Hypertension. Past Surgical History:  has a past surgical history that includes Colonoscopy (2007); Hysterectomy (); Appendectomy (); bladder suspension; Cataract removal; Tonsillectomy (as a child); tumor excision; Cholecystectomy (10/15/2013); Colonoscopy (2017); joint replacement (2013); Abdominal aortic aneurysm repair; Cardiac surgery; Cardiac catheterization (Right, 2022); Upper gastrointestinal endoscopy (N/A, 2023); Colonoscopy (N/A, 5/10/2023); Capsule endoscopy (N/A, 2023); Upper gastrointestinal endoscopy (N/A, 2023); and colectomy (N/A, 2023). Assessment   Performance deficits / Impairments: Decreased functional mobility ; Decreased safe awareness;Decreased balance;Decreased ADL status; Decreased endurance;Decreased high-level IADLs;Decreased strength  Assessment: 68 y/o female admitted 2023 with SOB. PTA Pt lives at home with spouse who assists with all ADLs and ambulates with walker.  Pt reports spouse

## 2023-09-13 NOTE — PROGRESS NOTES
Physical Therapy  Facility/Department: [facility] 1B PROGRESSIVE CARE  Physical Therapy Treatment Note    Name: Jourdan Garner  : 1945  MRN: [id number]  Date of Service: 2023    Discharge Recommendations:  24 hour supervision or assist, Home with Home health PT, [address] (pt would benefit from SNF however  refusing and adamant he is taking pt home at discharge)   PT Equipment Recommendations  Equipment Needed: No      Jourdan Garner scored a 15/24 on the AM-PAC short mobility form. At this time, pt would benefit from SNF however  adamant he is taking pt home at discharge; if pt return home then recommend home PT    Patient Diagnosis(es): The primary encounter diagnosis was Non-STEMI (non-ST elevated myocardial infarction) ([address]). Diagnoses of Chronic renal failure, stage 3b ([address]) and Acute on chronic congestive heart failure, unspecified heart failure type [facility]) were also pertinent to this visit. Past Medical History:  has a past medical history of AAA (abdominal aortic aneurysm) ([address]), AAA (abdominal aortic aneurysm) without rupture (HCC), Atrial fibrillation ([address]), CAD (coronary artery disease), CHF (congestive heart failure) ([address]), COPD (chronic obstructive pulmonary disease) ([address]), History of blood clots, Hyperlipidemia, and Hypertension. Past Surgical History:  has a past surgical history that includes Colonoscopy (2007); Hysterectomy (); Appendectomy (); bladder suspension; Cataract removal; Tonsillectomy (as a child); tumor excision; Cholecystectomy (10/15/2013); Colonoscopy (2017); joint replacement (2013); Abdominal aortic aneurysm repair; Cardiac surgery; Cardiac catheterization (Right, 2022); Upper gastrointestinal endoscopy (N/A, 2023); Colonoscopy (N/A, 5/10/2023); Capsule endoscopy (N/A, 2023); Upper gastrointestinal endoscopy (N/A, 2023); and colectomy (N/A, 2023).     Assessment   Body Structures, limiting at times     Objective      Bed mobility  Bed Mobility Comments: up in chair at beginning and end of session  Transfers  Sit to Stand: Minimal Assistance; Moderate Assistance  Stand to Sit: Minimal Assistance; Moderate Assistance  Ambulation  Surface: Level tile  Device: Rolling Walker  Assistance: Minimal assistance  Quality of Gait: very anxious and moaning while walking, belching throughout; Kept saying \"My knees are giving out I'm going to fall\" therefore therapist pulled a chair up under pt  Distance: 10'  Comments: upon sitting in chair therapist noticed that pt despite having an adult brief in place urinated all over the floor; completed SPT from arm chair to [facility] with mod A to get pt cleaned up; upon taking pt's brief off, pt was also having a BM; pt had a large BM in [facility]; pt stood with steady after finishing and therapist cleansed pt; pt needed min A to maintain standing; new brief re-applied and pt transferred to recliner chair via stedy; new pure wick placed and pt positioned with LEs elevated and pillow placed for support     Balance  Comments: CGA/SBA for sitting balance; min A for static standing with RW and on stedy,           OutComes Score                                                  AM-PAC Score  AM-PAC Inpatient Mobility Raw Score : 15 (09/07/23 1423)  AM-PAC Inpatient T-Scale Score : 39.45 (09/07/23 1423)  Mobility Inpatient CMS 0-100% Score: 57.7 (09/07/23 1423)  Mobility Inpatient CMS G-Code Modifier : CK (09/07/23 1423)          Tinneti Score       Goals  Short Term Goals  Time Frame for Short Term Goals: by discharge (all ongoing)  Short Term Goal 1: bed mob SBA  Short Term Goal 2: transfers SBA  Short Term Goal 3: amb 22' with RW SBA  Patient Goals   Patient Goals : to return home       Education  Patient Education  Education Given To: Patient  Education Provided Comments: reviewed call light and not getting up without assist; discussed safety concerns with pt and  regarding

## 2023-09-13 NOTE — PROGRESS NOTES
[facility]   Daily Progress Note      Admit Date:  9/1/2023    CC: SOB    HPI:   Emely Ballard is a 69 y.o. female with PMH  CAD, AF s/p PVI ablation 10/2020- recurrent AF with DCCV 1/2021 and repeat AF ablation 2/2021 (failed flecaininde), HTN, aortic aneurysm, AAA s/p repair 3/2018 (Dr. Adam Ramirez, DENISE (intolerant to CPAP). [place] stress>[facility] 3/2018 revealed  of RCA and non obstructive dz of LAD and LCX. Adm to [facility] 5/2022 for dofetilide loading, converted to NSR. Abn Stress followed by [facility] 9/20/2022 showed CAD. Multiple HF/hypoxic resp failure hospitalizations/ re admissions over last year. S/P Cardiomems PA sensor implant 11/2022. Non complaint with readings. GIB 4/2023 & 5/2023 requiring multiple units of PRBC>EGD with gastric AVMs. Admitted 6/25-7/2/2023 AMS, found to have UTI. Mentation improved with treatment of UTI. Cardiology consulted and hypervolemic. IV Lasix was started with improvement in SOB, bloating and edema. Orthopnea. 4L NC     Presented to Dr. Vadim Gallardo office with SOB and edema. Sent to ED for decompensated HF. Initial Cardiomems PAD elevated 35. Started on Lasix IV and metolazone- diuresing. Now with Left neck swelling and pain-CT scan revealed parotiditis- improving  She is lethargic but arouses and says he r breathing and swelling slightly improved. However, now on 4 L of nasal cannula with increased bilateral lower extremity swelling  BP has been elevated. Interval history 9/8/2023  -Had modest diuresis with IV Lasix. Weight remains unchanged  -Tenderness on the right side of her face is improving  -Remains on 4 L of oxygen which is her home requirement  -Lower extremity edema persist  -Heart rate remains in mid 50s and start of reducing her Tikosyn    Interval history 9/11/2023  -Patient continues to show improvement. Denies any significant shortness of breath. She was started on Lasix drip by nephrology over the weekend.   She is diuresing well though

## 2023-09-13 NOTE — PROGRESS NOTES
V2.0    [facility] Progress Note      Name:  Garland Escobar /Age/Sex: 1945  (69 y.o. female)   MRN & CSN:  [id number] & [id number] Encounter Date/Time: 2023 6:33 PM EDT   Location:  [id number] PCP: Kate Mccormack MD     Attending: Fernandez Yepez MD       Hospital Day: 13    Assessment and Recommendations   Garland Escobar is a 69 y.o. female with pmh of CHF who presents with SOB (shortness of breath)      Plan:     Parotitis  antibiotics  Consult to ENT  Check CT neck, switch abx to cefepime and added vanco, consulted ID, appears improved, await ID recs for duration of abx     Acute on chronic hypercapnic respiratory failure  -On recent discharge within the last month,   she was on 2 to 3 L nasal cannula. Patient is on 4L nasal canola   Respiratory distress in emergency room  -Tachypnea and hypercapnia      Acute COPD exacerbation  - on duoneb/neg, steroids  - pulmonology is following     Acute on chronic diastolic CHF:  -Per , torsemide was decreased from twice a day to once a day   and patient having lower extremity edema and weight gain  continue Lasix to 60 mg IV twice daily. Fluid balance -9 L  Cr increasing 1.7 to 1.9-->2.5 - held lasix 2/2 increase in creat  Cardiology is following  Resumed lasix at 40-->60mg IV BID, switched to lasix gtt   - monitor creat improved from 2.2-->1.9  -Deescalating lasix drip to oral therapy      ARF on CKD - consulted nephrology per cardio to help with diuresis, started on lasix gtt, not on oral diuresis     Hypotension - resolved, BP better, resume hydralazine/clonidine, cont coreg/cardizem     Hematuria - held xarelto yesterday, improved - resumed xarelto     Atrial fibrillation:   Continue diltiazem, resume xarelto     CAD   Continue Ranexa, Imdur      Diet ADULT DIET; Regular; 3 carb choices (45 gm/meal);  Low Sodium (2 gm); 1500 ml  ADULT ORAL NUTRITION SUPPLEMENT; Lunch, Dinner; Diabetic Oral Supplement   DVT Prophylaxis [x] Lovenox, []  Heparin, []

## 2023-09-14 LAB
ANION GAP SERPL CALCULATED.3IONS-SCNC: 9 MMOL/L (ref 3–16)
BUN SERPL-MCNC: 59 MG/DL (ref 7–20)
CALCIUM SERPL-MCNC: 8.8 MG/DL (ref 8.3–10.6)
CHLORIDE SERPL-SCNC: 98 MMOL/L (ref 99–110)
CO2 SERPL-SCNC: 31 MMOL/L (ref 21–32)
CREAT SERPL-MCNC: 1.9 MG/DL (ref 0.6–1.2)
GFR SERPLBLD CREATININE-BSD FMLA CKD-EPI: 27 ML/MIN/{1.73_M2}
GLUCOSE SERPL-MCNC: 116 MG/DL (ref 70–99)
POTASSIUM SERPL-SCNC: 4 MMOL/L (ref 3.5–5.1)
SODIUM SERPL-SCNC: 138 MMOL/L (ref 136–145)

## 2023-09-14 PROCEDURE — 36415 COLL VENOUS BLD VENIPUNCTURE: CPT

## 2023-09-14 PROCEDURE — 80048 BASIC METABOLIC PNL TOTAL CA: CPT

## 2023-09-14 PROCEDURE — 97116 GAIT TRAINING THERAPY: CPT | Performed by: PHYSICAL THERAPIST

## 2023-09-14 PROCEDURE — 97535 SELF CARE MNGMENT TRAINING: CPT

## 2023-09-14 PROCEDURE — 6370000000 HC RX 637 (ALT 250 FOR IP): Performed by: INTERNAL MEDICINE

## 2023-09-14 PROCEDURE — 99233 SBSQ HOSP IP/OBS HIGH 50: CPT | Performed by: INTERNAL MEDICINE

## 2023-09-14 PROCEDURE — 94760 N-INVAS EAR/PLS OXIMETRY 1: CPT

## 2023-09-14 PROCEDURE — 97530 THERAPEUTIC ACTIVITIES: CPT | Performed by: PHYSICAL THERAPIST

## 2023-09-14 PROCEDURE — 94640 AIRWAY INHALATION TREATMENT: CPT

## 2023-09-14 PROCEDURE — 6370000000 HC RX 637 (ALT 250 FOR IP): Performed by: HOSPITALIST

## 2023-09-14 PROCEDURE — 2700000000 HC OXYGEN THERAPY PER DAY

## 2023-09-14 PROCEDURE — 2580000003 HC RX 258: Performed by: HOSPITALIST

## 2023-09-14 PROCEDURE — 2060000000 HC ICU INTERMEDIATE R&B

## 2023-09-14 RX ORDER — METOLAZONE 5 MG/1
5 TABLET ORAL ONCE
Status: COMPLETED | OUTPATIENT
Start: 2023-09-14 | End: 2023-09-14

## 2023-09-14 RX ADMIN — HYDRALAZINE HYDROCHLORIDE 75 MG: 50 TABLET, FILM COATED ORAL at 21:38

## 2023-09-14 RX ADMIN — CLONIDINE HYDROCHLORIDE 0.1 MG: 0.1 TABLET ORAL at 10:54

## 2023-09-14 RX ADMIN — FERROUS SULFATE TAB EC 324 MG (65 MG FE EQUIVALENT) 324 MG: 324 (65 FE) TABLET DELAYED RESPONSE at 10:54

## 2023-09-14 RX ADMIN — METOLAZONE 5 MG: 5 TABLET ORAL at 17:25

## 2023-09-14 RX ADMIN — MOMETASONE FUROATE AND FORMOTEROL FUMARATE DIHYDRATE 2 PUFF: 200; 5 AEROSOL RESPIRATORY (INHALATION) at 19:46

## 2023-09-14 RX ADMIN — CARVEDILOL 6.25 MG: 6.25 TABLET, FILM COATED ORAL at 10:54

## 2023-09-14 RX ADMIN — RANOLAZINE 500 MG: 500 TABLET, FILM COATED, EXTENDED RELEASE ORAL at 21:38

## 2023-09-14 RX ADMIN — CLONIDINE HYDROCHLORIDE 0.1 MG: 0.1 TABLET ORAL at 21:38

## 2023-09-14 RX ADMIN — HYDRALAZINE HYDROCHLORIDE 75 MG: 50 TABLET, FILM COATED ORAL at 15:48

## 2023-09-14 RX ADMIN — DOFETILIDE 125 MCG: 0.12 CAPSULE ORAL at 10:55

## 2023-09-14 RX ADMIN — RIVAROXABAN 15 MG: 15 TABLET, FILM COATED ORAL at 10:55

## 2023-09-14 RX ADMIN — OXYCODONE HYDROCHLORIDE AND ACETAMINOPHEN 500 MG: 500 TABLET ORAL at 10:54

## 2023-09-14 RX ADMIN — RANOLAZINE 500 MG: 500 TABLET, FILM COATED, EXTENDED RELEASE ORAL at 10:53

## 2023-09-14 RX ADMIN — PANTOPRAZOLE SODIUM 40 MG: 40 TABLET, DELAYED RELEASE ORAL at 05:15

## 2023-09-14 RX ADMIN — EMPAGLIFLOZIN 10 MG: 10 TABLET, FILM COATED ORAL at 10:53

## 2023-09-14 RX ADMIN — CLINDAMYCIN HYDROCHLORIDE 300 MG: 150 CAPSULE ORAL at 15:48

## 2023-09-14 RX ADMIN — POTASSIUM CHLORIDE 40 MEQ: 1500 TABLET, EXTENDED RELEASE ORAL at 10:51

## 2023-09-14 RX ADMIN — MOMETASONE FUROATE AND FORMOTEROL FUMARATE DIHYDRATE 2 PUFF: 200; 5 AEROSOL RESPIRATORY (INHALATION) at 07:56

## 2023-09-14 RX ADMIN — HYDRALAZINE HYDROCHLORIDE 75 MG: 50 TABLET, FILM COATED ORAL at 05:15

## 2023-09-14 RX ADMIN — ISOSORBIDE MONONITRATE 120 MG: 60 TABLET, EXTENDED RELEASE ORAL at 10:54

## 2023-09-14 RX ADMIN — SODIUM CHLORIDE, PRESERVATIVE FREE 10 ML: 5 INJECTION INTRAVENOUS at 21:38

## 2023-09-14 RX ADMIN — ROSUVASTATIN CALCIUM 10 MG: 10 TABLET, FILM COATED ORAL at 10:51

## 2023-09-14 RX ADMIN — DOFETILIDE 125 MCG: 0.12 CAPSULE ORAL at 21:38

## 2023-09-14 RX ADMIN — CLINDAMYCIN HYDROCHLORIDE 300 MG: 150 CAPSULE ORAL at 21:38

## 2023-09-14 RX ADMIN — CARVEDILOL 6.25 MG: 6.25 TABLET, FILM COATED ORAL at 17:26

## 2023-09-14 RX ADMIN — SODIUM CHLORIDE, PRESERVATIVE FREE 10 ML: 5 INJECTION INTRAVENOUS at 10:56

## 2023-09-14 RX ADMIN — FUROSEMIDE 40 MG: 40 TABLET ORAL at 18:47

## 2023-09-14 RX ADMIN — CLINDAMYCIN HYDROCHLORIDE 300 MG: 150 CAPSULE ORAL at 05:15

## 2023-09-14 RX ADMIN — FUROSEMIDE 40 MG: 40 TABLET ORAL at 10:54

## 2023-09-14 RX ADMIN — TIOTROPIUM BROMIDE INHALATION SPRAY 2 PUFF: 3.12 SPRAY, METERED RESPIRATORY (INHALATION) at 07:56

## 2023-09-14 RX ADMIN — FERROUS SULFATE TAB EC 324 MG (65 MG FE EQUIVALENT) 324 MG: 324 (65 FE) TABLET DELAYED RESPONSE at 17:26

## 2023-09-14 ASSESSMENT — PAIN SCALES - GENERAL: PAINLEVEL_OUTOF10: 0

## 2023-09-14 NOTE — PROGRESS NOTES
[address]   Daily Progress Note      Admit Date:  9/1/2023    CC: SOB    HPI:   Emely Ballard is a 69 y.o. female with PMH  CAD, AF s/p PVI ablation 10/2020- recurrent AF with DCCV 1/2021 and repeat AF ablation 2/2021 (failed flecaininde), HTN, aortic aneurysm, AAA s/p repair 3/2018 (Dr. Adam Ramirez, DENISE (intolerant to CPAP). [employer] [place] stress>C 3/2018 revealed  of RCA and non obstructive dz of LAD and LCX. Adm to [facility] 5/2022 for dofetilide loading, converted to NSR. Abn Stress followed by [facility] 9/20/2022 showed CAD. Multiple HF/hypoxic resp failure hospitalizations/ re admissions over last year. S/P Cardiomems PA sensor implant 11/2022. Non complaint with readings. GIB 4/2023 & 5/2023 requiring multiple units of PRBC>EGD with gastric AVMs. Admitted 6/25-7/2/2023 AMS, found to have UTI. Mentation improved with treatment of UTI. Cardiology consulted and hypervolemic. IV Lasix was started with improvement in SOB, bloating and edema. Orthopnea. 4L NC     Presented to Dr. Vadim Gallardo office with SOB and edema. Sent to ED for decompensated HF. Initial Cardiomems PAD elevated 35. Started on Lasix IV and metolazone- diuresing. Now with Left neck swelling and pain-CT scan revealed parotiditis- improving  She is lethargic but arouses and says he r breathing and swelling slightly improved. However, now on 4 L of nasal cannula with increased bilateral lower extremity swelling  BP has been elevated. I  Interval history 9/13/2023  Patient currently denies any significant shortness of breath. Her PAD reading from 9/12/2023 was 18 mmHg. Lasix drip is now discontinued. Blood pressure still remains elevated     Interval history 9/14/2023  Patient continues to improve. She is now switched to oral Lasix and renal function this morning shows further improvement. Blood pressure is elevated but better than before.   Her weight has increased slightly from yesterday  -Still has significant bilateral lower low fat diet that consist of mostly fruits, vegetables and grains (Dash diet)  -limited amount of alcohol (no more than 1 drink/day for women, 2 drinks/day for men)  -regular physical activity  -no smoking  -stress reduction     5.) CAD: Hx  RCA, LHC 9/20/2022 with DESX2 to LCX. Stable. Cont GDMT. DAPT: xarelto only (hx GIB)  No BB - low HR  Cont ranexa- monitor HR and BP and QTC D/T renal fx  Lipid management/high intensity statin: crestor  Risk factor management: high blood pressure, high cholesterol, Diabetes, smoking, obesity, family hx  Lifestyle modification: Heart healthy diet, regular exercise, weight loss, smoking cessation, stress reduction    6.) GIB: no evidence of GIB this adm- H/H stable    7.) Sinus Bradycardia:   Likely from multiple meds and decreased renal fx   decreased tikosyn  -will hold cardizem  E    8.) MARK on CKD:    (Dr. Knowles Board as OP)  Baseline creat 1.6-1.8  -Creatinine 1.9today  On p.o. Lasix. Nephrology following    9.) Acute perotiditis  ID/ amtibiotics    Complexity of medical decision making-high.   Hopefully home tomorrow    Electronically signed by Ector Szymanski MD on 9/14/2023 at 10:38 AM

## 2023-09-14 NOTE — PROGRESS NOTES
V2.0    [facility] Progress Note      Name:  Dyana Daley /Age/Sex: 1945  (69 y.o. female)   MRN & CSN:  [id number] & [id number] Encounter Date/Time: 2023 6:33 PM EDT   Location:  [id number] PCP: Christine Kocher, MD     Attending: Judith Hernández MD       Hospital Day: 14    Assessment and Recommendations   Dyana Daley is a 69 y.o. female with pmh of CHF who presents with SOB (shortness of breath)      Plan:     Parotitis  antibiotics  Consult to ENT  Check CT neck, switch abx to cefepime and added vanco, consulted ID, appears improved, await ID recs for duration of abx     Acute on chronic hypercapnic respiratory failure  -On recent discharge within the last month,   she was on 2 to 3 L nasal cannula. Patient is on 4L nasal canola   Respiratory distress in emergency room  -Tachypnea and hypercapnia now resolved     Acute COPD exacerbation  - on duoneb/neg, steroids  - pulmonology is following     Acute on chronic diastolic CHF:  -Per , torsemide was decreased from twice a day to once a day   and patient having lower extremity edema and weight gain  continue Lasix to 60 mg IV twice daily. Fluid balance -9 L  Cr increasing 1.7 to 1.9-->2.5 - held lasix 2/2 increase in creat  Cardiology is following  Resumed lasix at 40-->60mg IV BID, switched to lasix gtt   - monitor creat improved from 2.2-->1.9  -Deescalating lasix drip to oral therapy   -Cardiology continuing to adjust     ARF on CKD - consulted nephrology per cardio to help with diuresis, started on lasix gtt, not on oral diuresis     Hypotension - resolved, BP better, resume hydralazine/clonidine, cont coreg/cardizem     Hematuria - held xarelto yesterday, improved - resumed xarelto     Atrial fibrillation:   Continue diltiazem, resume xarelto     CAD   Continue Ranexa, Imdur      Diet ADULT DIET; Regular; 3 carb choices (45 gm/meal);  Low Sodium (2 gm); 1500 ml  ADULT ORAL NUTRITION SUPPLEMENT; Lunch, Dinner; Diabetic Oral Supplement

## 2023-09-14 NOTE — PROGRESS NOTES
Physical Therapy  Facility/Department: [facility] 1B PROGRESSIVE CARE  Physical Therapy Treatment Note    Name: Emely Ballard  : 1945  MRN: [id number]  Date of Service: 2023    Discharge Recommendations:  [address], 24 hour supervision or assist, Home with Home health PT   PT Equipment Recommendations  Equipment Needed: No      Emely Ballard scored a 15/24 on the AM-PAC short mobility form. At this time, pt would benefit from SNF however  adamant he is taking pt home at discharge; if pt return home then recommend home PT    Patient Diagnosis(es): The primary encounter diagnosis was Non-STEMI (non-ST elevated myocardial infarction) ([address]). Diagnoses of Chronic renal failure, stage 3b ([address]) and Acute on chronic congestive heart failure, unspecified heart failure type [facility]) were also pertinent to this visit. Past Medical History:  has a past medical history of AAA (abdominal aortic aneurysm) ([address]), AAA (abdominal aortic aneurysm) without rupture (HCC), Atrial fibrillation ([address]), CAD (coronary artery disease), CHF (congestive heart failure) ([address]), COPD (chronic obstructive pulmonary disease) ([address]), History of blood clots, Hyperlipidemia, and Hypertension. Past Surgical History:  has a past surgical history that includes Colonoscopy (2007); Hysterectomy (); Appendectomy (); bladder suspension; Cataract removal; Tonsillectomy (as a child); tumor excision; Cholecystectomy (10/15/2013); Colonoscopy (2017); joint replacement (2013); Abdominal aortic aneurysm repair; Cardiac surgery; Cardiac catheterization (Right, 2022); Upper gastrointestinal endoscopy (N/A, 2023); Colonoscopy (N/A, 5/10/2023); Capsule endoscopy (N/A, 2023); Upper gastrointestinal endoscopy (N/A, 2023); and colectomy (N/A, 2023).     Assessment   Body Structures, Functions, Activity Limitations Requiring Skilled Therapeutic Intervention: Decreased functional mobility while walking, belching throughout; Kept saying \"My knees are giving out I'm going to fall\" therefore therapist pulled a chair up under pt  Distance: 15'  Comments: pt was having a BM upon sitting up OOB therefore completed SPT to [facility] with pt urinating on floor during transfer; stood when pt thought she was finished but then started to urinate and deficate again therefore sat back again on [facility]; therapist cleansed pt and new adult brief donned; positioned in chair at end of session with LEs elevated and all needs in reach     Balance  Comments: CGA/SBA for sitting balance; min A for static standing with RW           OutComes Score                                                  AM-PAC Score  AM-PAC Inpatient Mobility Raw Score : 15 (09/07/23 1423)  AM-PAC Inpatient T-Scale Score : 39.45 (09/07/23 1423)  Mobility Inpatient CMS 0-100% Score: 57.7 (09/07/23 1423)  Mobility Inpatient CMS G-Code Modifier : CK (09/07/23 1423)          Tinneti Score       Goals  Short Term Goals  Time Frame for Short Term Goals: by discharge (all ongoing)  Short Term Goal 1: bed mob SBA  Short Term Goal 2: transfers SBA  Short Term Goal 3: amb 22' with RW SBA  Patient Goals   Patient Goals : to return home       Education  Patient Education  Education Provided Comments: reviewed call light and not getting up without assist  Education Method: Verbal  Education Outcome: Verbalized understanding;Continued education needed      Therapy Time   Individual Concurrent Group Co-treatment   Time In 1420         Time Out 1510         Minutes 50                 SHARYN HILLMAN PT     Electronically signed by SHARYN HILLMAN PT on 9/14/2023 at 3:11 PM

## 2023-09-14 NOTE — NURSE NAVIGATOR
Met again with Nils Harper and Linda to review the importance of using the Cardiomems pillow . Nils Harper was resting, so I spoke with Linda. We talked about how the pillow is meant to detect the beginning of heart failure--days before he would notice any changes in Nils Harper. He tried to do it the last time they went home, but he thought the fuse had blown. As it turned out, it was probably a computer reboot. I urged him to get the reading on Saturday or Sunday once they are home so he gets the reading when she is \"dry\", and then keep up with it every few days. I told them we all know how badly they want to stay home--so if he can  a change early--hopefully it can be handled with a phone call. Also reviewed the importance of the 64 ounce fluid restriction, and provided the Heart Failure Measuring Cup. He seemed to understand. They are planning on discharge tomorrow. He is aware that an appointment will be in place with in 7 days.

## 2023-09-14 NOTE — PROGRESS NOTES
Occupational Therapy  Facility/Department: [facility] PROGRESSIVE CARE  Occupational Therapy Daily Assessment    Name: Alejandra Goldman  : 1945  MRN: [id number]  Date of Service: 2023    Discharge Recommendations:  Patient would benefit from continued therapy after discharge, 3-5 sessions per week  OT Equipment Recommendations  Equipment Needed: No     Alejandra Goldman scored a 15/24 on the AM-PAC ADL Inpatient form. At this time, Pt will benefit from skilled placement prior to returning home. Pt states she will discuss with spouse. If return home, recommend 24 hour assist and home OT. Patient Diagnosis(es): The primary encounter diagnosis was Non-STEMI (non-ST elevated myocardial infarction) ([address]). Diagnoses of Chronic renal failure, stage 3b ([address]) and Acute on chronic congestive heart failure, unspecified heart failure type [facility]) were also pertinent to this visit. Past Medical History:  has a past medical history of AAA (abdominal aortic aneurysm) ([address]), AAA (abdominal aortic aneurysm) without rupture (HCC), Atrial fibrillation ([address]), CAD (coronary artery disease), CHF (congestive heart failure) ([address]), COPD (chronic obstructive pulmonary disease) ([address]), History of blood clots, Hyperlipidemia, and Hypertension. Past Surgical History:  has a past surgical history that includes Colonoscopy (2007); Hysterectomy (); Appendectomy (); bladder suspension; Cataract removal; Tonsillectomy (as a child); tumor excision; Cholecystectomy (10/15/2013); Colonoscopy (2017); joint replacement (2013); Abdominal aortic aneurysm repair; Cardiac surgery; Cardiac catheterization (Right, 2022); Upper gastrointestinal endoscopy (N/A, 2023); Colonoscopy (N/A, 5/10/2023); Capsule endoscopy (N/A, 2023); Upper gastrointestinal endoscopy (N/A, 2023); and colectomy (N/A, 2023). Assessment   Performance deficits / Impairments: Decreased functional mobility ; Decreased safe hyperlipidemia and abdominal aortic aneurysm status post endovascular repair. Patient also has history of GI bleed recently she is status post ablation of AVM     Patient presented again with gradually worsening lower extremity edema  And gradually worsening shortness of breath. Patient's  reported that patient has been very sleepy and lethargic. Been using 4 L nasal cannula oxygen. History of fever chills no cough or chest pain. \"  Family / Caregiver Present: Yes (spouse)  Referring Practitioner: Duke Camara MD  Subjective  Subjective: Pt in bed  upon arrival to room. Pt agreeable to OT for OOB to chair with encouragement. Pt denied pain, and c/o fatigue. General Comment  Comments: Per RN ok for therapy     Social/Functional History  Social/Functional History  Lives With: Spouse  Type of Home: Mobile home  Home Layout: One level  Home Access: Stairs to enter without rails  Entrance Stairs - Number of Steps: 1 small step  Bathroom Shower/Tub: Tub/Shower unit (walk in tub)  Bathroom Toilet: Bedside commode  Bathroom Equipment: Built-in shower seat, Hand-held shower, 3-in-1 commode, Grab bars in shower  Home Equipment: Devan Tiagoi, 4 wheeled, Oxygen, Wheelchair-manual, Lift chair (sleeps in a recliner)  ADL Assistance: Needs assistance  Homemaking Assistance:  (spouse completes all IADLs)  Ambulation Assistance: Independent (short distances with 1FD)  Transfer Assistance: Independent  Active : No  IADL Comments: Primarily sleeps in recliner  Additional Comments: Pt with multiple hospital admissions this year- most recently 7/31-8/9 and discharged home. Objective   Safety Devices  Type of Devices: Call light within reach; Chair alarm in place; Left in chair;Nurse notified;Gait belt;Patient at risk for falls; All fall risk precautions in place     Toilet Transfers  Equipment Used: Standard bedside commode  Toilet Transfer: Minimal assistance  Toilet Transfers Comments: stood twice from [facility] to [address] [place] [address] with

## 2023-09-15 LAB
ANION GAP SERPL CALCULATED.3IONS-SCNC: 9 MMOL/L (ref 3–16)
BUN SERPL-MCNC: 63 MG/DL (ref 7–20)
CALCIUM SERPL-MCNC: 8.7 MG/DL (ref 8.3–10.6)
CHLORIDE SERPL-SCNC: 95 MMOL/L (ref 99–110)
CO2 SERPL-SCNC: 32 MMOL/L (ref 21–32)
CREAT SERPL-MCNC: 1.9 MG/DL (ref 0.6–1.2)
GFR SERPLBLD CREATININE-BSD FMLA CKD-EPI: 27 ML/MIN/{1.73_M2}
GLUCOSE SERPL-MCNC: 112 MG/DL (ref 70–99)
POTASSIUM SERPL-SCNC: 3.5 MMOL/L (ref 3.5–5.1)
SODIUM SERPL-SCNC: 136 MMOL/L (ref 136–145)

## 2023-09-15 PROCEDURE — 94760 N-INVAS EAR/PLS OXIMETRY 1: CPT

## 2023-09-15 PROCEDURE — 36415 COLL VENOUS BLD VENIPUNCTURE: CPT

## 2023-09-15 PROCEDURE — 2060000000 HC ICU INTERMEDIATE R&B

## 2023-09-15 PROCEDURE — 6370000000 HC RX 637 (ALT 250 FOR IP): Performed by: HOSPITALIST

## 2023-09-15 PROCEDURE — 6370000000 HC RX 637 (ALT 250 FOR IP): Performed by: NURSE PRACTITIONER

## 2023-09-15 PROCEDURE — 6370000000 HC RX 637 (ALT 250 FOR IP): Performed by: INTERNAL MEDICINE

## 2023-09-15 PROCEDURE — 97530 THERAPEUTIC ACTIVITIES: CPT | Performed by: PHYSICAL THERAPIST

## 2023-09-15 PROCEDURE — 2580000003 HC RX 258: Performed by: HOSPITALIST

## 2023-09-15 PROCEDURE — 99233 SBSQ HOSP IP/OBS HIGH 50: CPT | Performed by: NURSE PRACTITIONER

## 2023-09-15 PROCEDURE — 2700000000 HC OXYGEN THERAPY PER DAY

## 2023-09-15 PROCEDURE — 94640 AIRWAY INHALATION TREATMENT: CPT

## 2023-09-15 PROCEDURE — 80048 BASIC METABOLIC PNL TOTAL CA: CPT

## 2023-09-15 PROCEDURE — 97530 THERAPEUTIC ACTIVITIES: CPT

## 2023-09-15 RX ORDER — METOLAZONE 5 MG/1
2.5 TABLET ORAL ONCE
Status: COMPLETED | OUTPATIENT
Start: 2023-09-15 | End: 2023-09-15

## 2023-09-15 RX ADMIN — EMPAGLIFLOZIN 10 MG: 10 TABLET, FILM COATED ORAL at 09:29

## 2023-09-15 RX ADMIN — ACETAMINOPHEN 650 MG: 325 TABLET ORAL at 05:18

## 2023-09-15 RX ADMIN — OXYCODONE HYDROCHLORIDE AND ACETAMINOPHEN 500 MG: 500 TABLET ORAL at 09:28

## 2023-09-15 RX ADMIN — ISOSORBIDE MONONITRATE 120 MG: 60 TABLET, EXTENDED RELEASE ORAL at 09:28

## 2023-09-15 RX ADMIN — RIVAROXABAN 15 MG: 15 TABLET, FILM COATED ORAL at 09:29

## 2023-09-15 RX ADMIN — CLINDAMYCIN HYDROCHLORIDE 300 MG: 150 CAPSULE ORAL at 05:18

## 2023-09-15 RX ADMIN — FERROUS SULFATE TAB EC 324 MG (65 MG FE EQUIVALENT) 324 MG: 324 (65 FE) TABLET DELAYED RESPONSE at 18:18

## 2023-09-15 RX ADMIN — HYDRALAZINE HYDROCHLORIDE 75 MG: 50 TABLET, FILM COATED ORAL at 05:18

## 2023-09-15 RX ADMIN — MOMETASONE FUROATE AND FORMOTEROL FUMARATE DIHYDRATE 2 PUFF: 200; 5 AEROSOL RESPIRATORY (INHALATION) at 20:21

## 2023-09-15 RX ADMIN — FUROSEMIDE 40 MG: 40 TABLET ORAL at 09:28

## 2023-09-15 RX ADMIN — PANTOPRAZOLE SODIUM 40 MG: 40 TABLET, DELAYED RELEASE ORAL at 05:18

## 2023-09-15 RX ADMIN — DOFETILIDE 125 MCG: 0.12 CAPSULE ORAL at 22:28

## 2023-09-15 RX ADMIN — DOFETILIDE 125 MCG: 0.12 CAPSULE ORAL at 09:29

## 2023-09-15 RX ADMIN — FUROSEMIDE 40 MG: 40 TABLET ORAL at 18:18

## 2023-09-15 RX ADMIN — CLINDAMYCIN HYDROCHLORIDE 300 MG: 150 CAPSULE ORAL at 22:27

## 2023-09-15 RX ADMIN — MOMETASONE FUROATE AND FORMOTEROL FUMARATE DIHYDRATE 2 PUFF: 200; 5 AEROSOL RESPIRATORY (INHALATION) at 07:46

## 2023-09-15 RX ADMIN — RANOLAZINE 500 MG: 500 TABLET, FILM COATED, EXTENDED RELEASE ORAL at 22:27

## 2023-09-15 RX ADMIN — HYDRALAZINE HYDROCHLORIDE 75 MG: 50 TABLET, FILM COATED ORAL at 14:05

## 2023-09-15 RX ADMIN — HYDRALAZINE HYDROCHLORIDE 75 MG: 50 TABLET, FILM COATED ORAL at 22:27

## 2023-09-15 RX ADMIN — RANOLAZINE 500 MG: 500 TABLET, FILM COATED, EXTENDED RELEASE ORAL at 09:29

## 2023-09-15 RX ADMIN — CLONIDINE HYDROCHLORIDE 0.1 MG: 0.1 TABLET ORAL at 09:28

## 2023-09-15 RX ADMIN — POTASSIUM CHLORIDE 40 MEQ: 1500 TABLET, EXTENDED RELEASE ORAL at 09:29

## 2023-09-15 RX ADMIN — IPRATROPIUM BROMIDE AND ALBUTEROL SULFATE 1 DOSE: 2.5; .5 SOLUTION RESPIRATORY (INHALATION) at 20:22

## 2023-09-15 RX ADMIN — TIOTROPIUM BROMIDE INHALATION SPRAY 2 PUFF: 3.12 SPRAY, METERED RESPIRATORY (INHALATION) at 07:46

## 2023-09-15 RX ADMIN — SODIUM CHLORIDE, PRESERVATIVE FREE 10 ML: 5 INJECTION INTRAVENOUS at 09:29

## 2023-09-15 RX ADMIN — METOLAZONE 2.5 MG: 5 TABLET ORAL at 18:18

## 2023-09-15 RX ADMIN — CARVEDILOL 6.25 MG: 6.25 TABLET, FILM COATED ORAL at 18:18

## 2023-09-15 RX ADMIN — ROSUVASTATIN CALCIUM 10 MG: 10 TABLET, FILM COATED ORAL at 09:28

## 2023-09-15 RX ADMIN — CLINDAMYCIN HYDROCHLORIDE 300 MG: 150 CAPSULE ORAL at 14:05

## 2023-09-15 RX ADMIN — CARVEDILOL 6.25 MG: 6.25 TABLET, FILM COATED ORAL at 09:28

## 2023-09-15 RX ADMIN — CLONIDINE HYDROCHLORIDE 0.1 MG: 0.1 TABLET ORAL at 22:27

## 2023-09-15 RX ADMIN — FERROUS SULFATE TAB EC 324 MG (65 MG FE EQUIVALENT) 324 MG: 324 (65 FE) TABLET DELAYED RESPONSE at 09:28

## 2023-09-15 ASSESSMENT — PAIN SCALES - GENERAL: PAINLEVEL_OUTOF10: 0

## 2023-09-15 ASSESSMENT — PAIN SCALES - WONG BAKER: WONGBAKER_NUMERICALRESPONSE: 0

## 2023-09-15 NOTE — PROGRESS NOTES
Cardiology - PROGRESS NOTE    Admit Date: 9/1/2023     Reason for follow up:   Andrez Smith is a 69 y.o. female with PMH  CAD, AF s/p PVI ablation 10/2020- recurrent AF with DCCV 1/2021 and repeat AF ablation 2/2021 (failed flecaininde), HTN, aortic aneurysm, AAA s/p repair 3/2018 (Dr. Gail Leeon, DENISE (intolerant to CPAP). Erick Velasquez stress>[facility] 3/2018 revealed  of RCA and non obstructive dz of LAD and LCX. Adm to [facility] 5/2022 for dofetilide loading, converted to NSR. Abn Stress followed by [facility] 9/20/2022 showed CAD. Multiple HF/hypoxic resp failure hospitalizations/ re admissions over last year. S/P Cardiomems PA sensor implant 11/2022. Non complaint with readings. GIB 4/2023 & 5/2023 requiring multiple units of PRBC>EGD with gastric AVMs. Admitted 6/25-7/2/2023 AMS, found to have UTI. Mentation improved with treatment of UTI. Cardiology consulted and hypervolemic. IV Lasix was started with improvement in SOB, bloating and edema. Orthopnea. 4L NC     Presented to Dr. Chris Huntley office with SOB and edema. Sent to ED for decompensated HF. Initial Cardiomems PAD elevated 35. Started on Lasix IV and metolazone- diuresing. Now with Left neck swelling and pain-CT scan revealed parotiditis- improving    Initial CardioMems reading of 35    -14 L     Social History:   reports that she quit smoking about 10 years ago. Her smoking use included cigarettes. She started smoking about 46 years ago. She has a 37.00 pack-year smoking history. She has been exposed to tobacco smoke. She has quit using smokeless tobacco. She reports that she does not drink alcohol and does not use drugs. Family History: family history includes Heart Disease in her father; Hypertension in an other family member.   Living Situation: with spouse       Interval History:   Patient seen and examined and notes reviewed   Wt 234 lb   -15.6 L   Worked with PT/OT this AM   Tired  PT/OT

## 2023-09-15 NOTE — PLAN OF CARE
Problem: Discharge Planning  Goal: Discharge to home or other facility with appropriate resources  Outcome: Progressing     Problem: Safety - Adult  Goal: Free from fall injury  Outcome: Progressing     Problem: ABCDS Injury Assessment  Goal: Absence of physical injury  Outcome: Progressing     Problem: Skin/Tissue Integrity  Goal: Absence of new skin breakdown  Description: 1. Monitor for areas of redness and/or skin breakdown  2. Assess vascular access sites hourly  3. Every 4-6 hours minimum:  Change oxygen saturation probe site  4. Every 4-6 hours:  If on nasal continuous positive airway pressure, respiratory therapy assess nares and determine need for appliance change or resting period.   Outcome: Progressing     Problem: Respiratory - Adult  Goal: Achieves optimal ventilation and oxygenation  Outcome: Progressing     Problem: Cardiovascular - Adult  Goal: Maintains optimal cardiac output and hemodynamic stability  Outcome: Progressing     Problem: Cardiovascular - Adult  Goal: Absence of cardiac dysrhythmias or at baseline  Outcome: Progressing     Problem: Skin/Tissue Integrity - Adult  Goal: Skin integrity remains intact  Outcome: Progressing     Problem: Skin/Tissue Integrity - Adult  Goal: Oral mucous membranes remain intact  Outcome: Progressing     Problem: Musculoskeletal - Adult  Goal: Return mobility to safest level of function  Outcome: Progressing     Problem: Musculoskeletal - Adult  Goal: Return ADL status to a safe level of function  Outcome: Progressing     Problem: Gastrointestinal - Adult  Goal: Maintains or returns to baseline bowel function  Outcome: Progressing     Problem: Gastrointestinal - Adult  Goal: Maintains adequate nutritional intake  Outcome: Progressing     Problem: Genitourinary - Adult  Goal: Absence of urinary retention  Outcome: Progressing     Problem: Infection - Adult  Goal: Absence of infection at discharge  Outcome: Progressing     Problem: Metabolic/Fluid and Electrolytes - Adult  Goal: Electrolytes maintained within normal limits  Outcome: Progressing  Goal: Hemodynamic stability and optimal renal function maintained  Outcome: Progressing  Goal: Glucose maintained within prescribed range  Outcome: Progressing     Problem: Neurosensory - Adult  Goal: Achieves stable or improved neurological status  Outcome: Progressing  Goal: Achieves maximal functionality and self care  Outcome: Progressing     Problem: Pain  Goal: Verbalizes/displays adequate comfort level or baseline comfort level  Outcome: Progressing

## 2023-09-15 NOTE — PROGRESS NOTES
Occupational Therapy    Pt having increased LE edema today; opted not to transfer OOB to allow pt to keep her LEs elevated; performed LE ex in bed; gently performed some edema massage and wrapped LEs with ace wrap in an effort to decrease swelling; foot of bed elevated and pillows placed for support; all needs in reach and pt encouraged to complete ankle pumps throughout day/evening; will continue to follow.     Electronically signed by Randall Wilkins OT on 9/15/2023 at 2:47 PM

## 2023-09-15 NOTE — PROGRESS NOTES
Physical Therapy    [facility]  [id number]  [id number]    Pt having increased LE edema today; opted not to transfer OOB to allow pt to keep her LEs elevated; performed LE ex in bed; gently performed some edema massage and wrapped LEs with ace wrap in an effort to decrease swelling; foot of bed elevated and pillows placed for support; all needs in reach and pt encouraged to complete ankle pumps throughout day/evening; will continue to follow  Electronically signed by SHARYN HILLMAN, PT on 9/15/2023 at 2:38 PM

## 2023-09-15 NOTE — PROGRESS NOTES
Nutrition Assessment     Type and Reason for Visit: Reassess    Nutrition Recommendations/Plan:   Continue regular 3 carb low sodium diet w/ 1500 ml fluid restriction, dc ONS     Malnutrition Assessment:  Malnutrition Status: No malnutrition    Nutrition Assessment:  Follow up. Pt seen in room, reports good intake, good appetite. She is finishing most meals. SHe has glucerna ONS ordered BID. Pt reports she sometimes drinks them, given intake and fluid restriction, pt likely not needing them at this time. Will discontinue. Nutrition Related Findings:   9/14 BM, 112 glucose, Wound Type: None    Current Nutrition Therapies:    ADULT DIET; Regular; 3 carb choices (45 gm/meal);  Low Sodium (2 gm); 1500 ml    Anthropometric Measures:  Height: 5' 3\" (160 cm)  Current Body Wt: 229 lb 0.9 oz (103.9 kg)   BMI: 40.6    Nutrition Diagnosis:   No nutrition diagnosis at this time     Nutrition Interventions:   Food and/or Nutrient Delivery: Continue Current Diet, Discontinue Oral Nutrition Supplement  Nutrition Education/Counseling: Education not indicated  Coordination of Nutrition Care: Continue to monitor while inpatient       Goals:     Goals: Meet at least 75% of estimated needs, by next RD assessment       Nutrition Monitoring and Evaluation:   Behavioral-Environmental Outcomes: None Identified  Food/Nutrient Intake Outcomes: Food and Nutrient Intake  Physical Signs/Symptoms Outcomes: Biochemical Data, Weight, Nutrition Focused Physical Findings    Discharge Planning:    No discharge needs at this time     Cyrus Mosley, [address]: [id number]

## 2023-09-15 NOTE — CARE COORDINATION
DISCHARGE PLANNING:    [facility] referral placed vis portal.  Explained to patient and spouse, resources given at bedside. Open to more information. DC plan remains home, declining SNF. Oxygen through [employer].       #[phone]  Electronically signed by Zurdo Moya RN on 9/15/2023 at 1:21 PM

## 2023-09-16 LAB
ALBUMIN SERPL-MCNC: 3.1 G/DL (ref 3.4–5)
ANION GAP SERPL CALCULATED.3IONS-SCNC: 12 MMOL/L (ref 3–16)
BUN SERPL-MCNC: 62 MG/DL (ref 7–20)
CALCIUM SERPL-MCNC: 8.8 MG/DL (ref 8.3–10.6)
CHLORIDE SERPL-SCNC: 93 MMOL/L (ref 99–110)
CO2 SERPL-SCNC: 30 MMOL/L (ref 21–32)
CREAT SERPL-MCNC: 2.4 MG/DL (ref 0.6–1.2)
GFR SERPLBLD CREATININE-BSD FMLA CKD-EPI: 20 ML/MIN/{1.73_M2}
GLUCOSE SERPL-MCNC: 142 MG/DL (ref 70–99)
PHOSPHATE SERPL-MCNC: 4.6 MG/DL (ref 2.5–4.9)
POTASSIUM SERPL-SCNC: 3.1 MMOL/L (ref 3.5–5.1)
SODIUM SERPL-SCNC: 135 MMOL/L (ref 136–145)

## 2023-09-16 PROCEDURE — 6370000000 HC RX 637 (ALT 250 FOR IP): Performed by: HOSPITALIST

## 2023-09-16 PROCEDURE — 6360000002 HC RX W HCPCS: Performed by: INTERNAL MEDICINE

## 2023-09-16 PROCEDURE — 6370000000 HC RX 637 (ALT 250 FOR IP): Performed by: INTERNAL MEDICINE

## 2023-09-16 PROCEDURE — 94640 AIRWAY INHALATION TREATMENT: CPT

## 2023-09-16 PROCEDURE — 2700000000 HC OXYGEN THERAPY PER DAY

## 2023-09-16 PROCEDURE — 36415 COLL VENOUS BLD VENIPUNCTURE: CPT

## 2023-09-16 PROCEDURE — 80069 RENAL FUNCTION PANEL: CPT

## 2023-09-16 PROCEDURE — 2060000000 HC ICU INTERMEDIATE R&B

## 2023-09-16 PROCEDURE — 2580000003 HC RX 258: Performed by: HOSPITALIST

## 2023-09-16 PROCEDURE — 94760 N-INVAS EAR/PLS OXIMETRY 1: CPT

## 2023-09-16 PROCEDURE — 99233 SBSQ HOSP IP/OBS HIGH 50: CPT | Performed by: NURSE PRACTITIONER

## 2023-09-16 RX ORDER — POTASSIUM CHLORIDE 7.45 MG/ML
10 INJECTION INTRAVENOUS
Status: DISCONTINUED | OUTPATIENT
Start: 2023-09-16 | End: 2023-09-16

## 2023-09-16 RX ORDER — POTASSIUM CHLORIDE 20 MEQ/1
40 TABLET, EXTENDED RELEASE ORAL 2 TIMES DAILY WITH MEALS
Status: DISCONTINUED | OUTPATIENT
Start: 2023-09-16 | End: 2023-09-17 | Stop reason: HOSPADM

## 2023-09-16 RX ORDER — IPRATROPIUM BROMIDE AND ALBUTEROL SULFATE 2.5; .5 MG/3ML; MG/3ML
1 SOLUTION RESPIRATORY (INHALATION)
Status: DISCONTINUED | OUTPATIENT
Start: 2023-09-17 | End: 2023-09-17 | Stop reason: HOSPADM

## 2023-09-16 RX ADMIN — HYDRALAZINE HYDROCHLORIDE 75 MG: 50 TABLET, FILM COATED ORAL at 13:49

## 2023-09-16 RX ADMIN — HYDRALAZINE HYDROCHLORIDE 75 MG: 50 TABLET, FILM COATED ORAL at 21:06

## 2023-09-16 RX ADMIN — DOFETILIDE 125 MCG: 0.12 CAPSULE ORAL at 08:22

## 2023-09-16 RX ADMIN — CLINDAMYCIN HYDROCHLORIDE 300 MG: 150 CAPSULE ORAL at 21:06

## 2023-09-16 RX ADMIN — RANOLAZINE 500 MG: 500 TABLET, FILM COATED, EXTENDED RELEASE ORAL at 21:05

## 2023-09-16 RX ADMIN — RIVAROXABAN 15 MG: 15 TABLET, FILM COATED ORAL at 08:22

## 2023-09-16 RX ADMIN — FERROUS SULFATE TAB EC 324 MG (65 MG FE EQUIVALENT) 324 MG: 324 (65 FE) TABLET DELAYED RESPONSE at 16:59

## 2023-09-16 RX ADMIN — OXYCODONE HYDROCHLORIDE AND ACETAMINOPHEN 500 MG: 500 TABLET ORAL at 08:22

## 2023-09-16 RX ADMIN — TIOTROPIUM BROMIDE INHALATION SPRAY 2 PUFF: 3.12 SPRAY, METERED RESPIRATORY (INHALATION) at 08:36

## 2023-09-16 RX ADMIN — POTASSIUM CHLORIDE 10 MEQ: 7.46 INJECTION, SOLUTION INTRAVENOUS at 13:47

## 2023-09-16 RX ADMIN — FUROSEMIDE 40 MG: 40 TABLET ORAL at 08:22

## 2023-09-16 RX ADMIN — SODIUM CHLORIDE, PRESERVATIVE FREE 10 ML: 5 INJECTION INTRAVENOUS at 08:22

## 2023-09-16 RX ADMIN — RANOLAZINE 500 MG: 500 TABLET, FILM COATED, EXTENDED RELEASE ORAL at 08:22

## 2023-09-16 RX ADMIN — HYDRALAZINE HYDROCHLORIDE 75 MG: 50 TABLET, FILM COATED ORAL at 06:53

## 2023-09-16 RX ADMIN — ROSUVASTATIN CALCIUM 10 MG: 10 TABLET, FILM COATED ORAL at 08:22

## 2023-09-16 RX ADMIN — MOMETASONE FUROATE AND FORMOTEROL FUMARATE DIHYDRATE 2 PUFF: 200; 5 AEROSOL RESPIRATORY (INHALATION) at 08:37

## 2023-09-16 RX ADMIN — CLINDAMYCIN HYDROCHLORIDE 300 MG: 150 CAPSULE ORAL at 13:49

## 2023-09-16 RX ADMIN — SODIUM CHLORIDE, PRESERVATIVE FREE 10 ML: 5 INJECTION INTRAVENOUS at 21:09

## 2023-09-16 RX ADMIN — MOMETASONE FUROATE AND FORMOTEROL FUMARATE DIHYDRATE 2 PUFF: 200; 5 AEROSOL RESPIRATORY (INHALATION) at 20:45

## 2023-09-16 RX ADMIN — CLONIDINE HYDROCHLORIDE 0.1 MG: 0.1 TABLET ORAL at 08:21

## 2023-09-16 RX ADMIN — CARVEDILOL 6.25 MG: 6.25 TABLET, FILM COATED ORAL at 16:59

## 2023-09-16 RX ADMIN — CARVEDILOL 6.25 MG: 6.25 TABLET, FILM COATED ORAL at 08:22

## 2023-09-16 RX ADMIN — ISOSORBIDE MONONITRATE 120 MG: 60 TABLET, EXTENDED RELEASE ORAL at 08:22

## 2023-09-16 RX ADMIN — IPRATROPIUM BROMIDE AND ALBUTEROL SULFATE 1 DOSE: 2.5; .5 SOLUTION RESPIRATORY (INHALATION) at 20:48

## 2023-09-16 RX ADMIN — FERROUS SULFATE TAB EC 324 MG (65 MG FE EQUIVALENT) 324 MG: 324 (65 FE) TABLET DELAYED RESPONSE at 08:22

## 2023-09-16 RX ADMIN — POTASSIUM CHLORIDE 40 MEQ: 1500 TABLET, EXTENDED RELEASE ORAL at 08:22

## 2023-09-16 RX ADMIN — CLONIDINE HYDROCHLORIDE 0.1 MG: 0.1 TABLET ORAL at 21:05

## 2023-09-16 RX ADMIN — POTASSIUM CHLORIDE 40 MEQ: 1500 TABLET, EXTENDED RELEASE ORAL at 16:59

## 2023-09-16 RX ADMIN — DOFETILIDE 125 MCG: 0.12 CAPSULE ORAL at 21:06

## 2023-09-16 RX ADMIN — PANTOPRAZOLE SODIUM 40 MG: 40 TABLET, DELAYED RELEASE ORAL at 06:53

## 2023-09-16 RX ADMIN — CLINDAMYCIN HYDROCHLORIDE 300 MG: 150 CAPSULE ORAL at 06:53

## 2023-09-16 RX ADMIN — EMPAGLIFLOZIN 10 MG: 10 TABLET, FILM COATED ORAL at 08:22

## 2023-09-16 NOTE — PLAN OF CARE
Problem: Discharge Planning  Goal: Discharge to home or other facility with appropriate resources  Outcome: Progressing     Problem: Safety - Adult  Goal: Free from fall injury  Outcome: Progressing     Problem: ABCDS Injury Assessment  Goal: Absence of physical injury  Outcome: Progressing     Problem: Skin/Tissue Integrity  Goal: Absence of new skin breakdown  Description: 1. Monitor for areas of redness and/or skin breakdown  2. Assess vascular access sites hourly  3. Every 4-6 hours minimum:  Change oxygen saturation probe site  4. Every 4-6 hours:  If on nasal continuous positive airway pressure, respiratory therapy assess nares and determine need for appliance change or resting period.   Outcome: Progressing     Problem: Respiratory - Adult  Goal: Achieves optimal ventilation and oxygenation  Outcome: Progressing

## 2023-09-16 NOTE — PROGRESS NOTES
Hospitalist Progress Note  9/16/2023 11:57 AM  Subjective:   Admit Date: 9/1/2023  PCP: Aishwarya Pollock MD Status: Inpatient   Interval History: Hospital Day: 16, readmitted with  acute on chronic hypercapnic respiratory failure with COPD exacerbation and acute on chronic diastolic heart failure. Progressive dyspnea, and lower extremity edema. Requiring BiPAP 40% FiO2 and nasal cannula oxygen 4 LPM.  Initiated on furosemide infusion with subsequent acute kidney injury, now held. Recurrent admissions for grade II diastolic heart failure (EF 55-60%), atrial fibrillation s/p ablation / cardioversion, and COPD exacerbations. Diet: controlled carbohydrate, 2 gram sodium, 1500 ml fluid restriction  Right forearm peripheral IV (9/7, #10)  External urinary catheter (9/2, day #15)  Medications:     hydralazine  75 mg Oral TID   furosemide  40 mg Oral BID [held]   clindamycin  300 mg Oral TID    clonidine  0.1 mg Oral BID   dofetilide  125 mcg Oral BID   rosuvastatin  10 mg Oral Daily   rivaroxaban  15 mg Oral Daily   carvedilol  6.25 mg Oral BID WC   empagliflozin  10 mg Oral Daily   isosorbide mononitrate  120 mg Oral Daily   pantoprazole  40 mg Oral QAM AC   potassium chloride  40 mEq Oral Daily   ranolazine  500 mg Oral BID   vitamin C  500 mg Oral Daily   mometasone-formoterol  2 puff Inhalation BID RT   tiotropium  2 puff Inhalation Daily RT   ferrous sulfate  324 mg Oral BID WC     Recent Labs     09/14/23  0926 09/15/23  0433 09/16/23  0955    136 135*   K 4.0 3.5 3.1*   CL 98* 95* 93*   CO2 31 32 30   BUN 59* 63* 62*   CREATININE 1.9* 1.9* 2.4*   GLUCOSE 116* 112* 142*     CRP (9/12) 3.7 mg/L  Procalcitonin (9/12) 0.14 ng/mL    CT soft tissue neck (9/5) Findings consistent with an acute left parotiditis. No abscess or  sialolith is identified. Edematous appearance of the epiglottis and aryepiglottic folds with associated retropharyngeal edema and moderate supraglottic airway narrowing.   The findings are with rivaroxaban 20 mg daily. Discharge planning:  DC plan remains home, declining SNF.        Chaparrita Dao MD  [facility]

## 2023-09-16 NOTE — PROGRESS NOTES
Cardiology - PROGRESS NOTE    Admit Date: 9/1/2023     Reason for follow up:   Nikkie Coelho is a 69 y.o. female with PMH  CAD, AF s/p PVI ablation 10/2020- recurrent AF with DCCV 1/2021 and repeat AF ablation 2/2021 (failed flecaininde), HTN, aortic aneurysm, AAA s/p repair 3/2018 (Dr. John Garcia, DENISE (intolerant to CPAP). Reino Butler stress>C 3/2018 revealed  of RCA and non obstructive dz of LAD and LCX. Adm to [facility] 5/2022 for dofetilide loading, converted to NSR. Abn Stress followed by [facility] 9/20/2022 showed CAD. Multiple HF/hypoxic resp failure hospitalizations/ re admissions over last year. S/P Cardiomems PA sensor implant 11/2022. Non complaint with readings. GIB 4/2023 & 5/2023 requiring multiple units of PRBC>EGD with gastric AVMs. Admitted 6/25-7/2/2023 AMS, found to have UTI. Mentation improved with treatment of UTI. Cardiology consulted and hypervolemic. IV Lasix was started with improvement in SOB, bloating and edema. Orthopnea. 4L NC     Presented to Dr. Camila Rocha office with SOB and edema. Sent to ED for decompensated HF. Initial Cardiomems PAD elevated 35. Started on Lasix IV and metolazone- diuresing. Now with Left neck swelling and pain-CT scan revealed parotiditis- improving    Initial CardioMems reading of 35    -14 L     Social History:   reports that she quit smoking about 10 years ago. Her smoking use included cigarettes. She started smoking about 46 years ago. She has a 37.00 pack-year smoking history. She has been exposed to tobacco smoke. She has quit using smokeless tobacco. She reports that she does not drink alcohol and does not use drugs. Family History: family history includes Heart Disease in her father; Hypertension in an other family member.   Living Situation: with spouse       Interval History:   Patient seen and examined and notes reviewed  Wt 234 lb   -14.0 L    I/O do not correlate   PT/OT recommending

## 2023-09-17 ENCOUNTER — HOSPITAL ENCOUNTER (INPATIENT)
Age: 78
LOS: 2 days | Discharge: NURSING, REHAB OR LONG-TERM CARE | DRG: 291 | End: 2023-09-19
Attending: EMERGENCY MEDICINE | Admitting: INTERNAL MEDICINE
Payer: MEDICARE

## 2023-09-17 ENCOUNTER — APPOINTMENT (OUTPATIENT)
Dept: GENERAL RADIOLOGY | Age: 78
DRG: 291 | End: 2023-09-17
Payer: MEDICARE

## 2023-09-17 VITALS
OXYGEN SATURATION: 99 % | DIASTOLIC BLOOD PRESSURE: 40 MMHG | TEMPERATURE: 98 F | HEART RATE: 62 BPM | HEIGHT: 63 IN | BODY MASS INDEX: 42.46 KG/M2 | WEIGHT: 239.64 LBS | SYSTOLIC BLOOD PRESSURE: 125 MMHG | RESPIRATION RATE: 19 BRPM

## 2023-09-17 DIAGNOSIS — J44.1 COPD EXACERBATION (HCC): Primary | ICD-10-CM

## 2023-09-17 DIAGNOSIS — E87.3 METABOLIC ALKALOSIS: ICD-10-CM

## 2023-09-17 DIAGNOSIS — E86.1 INTRAVASCULAR VOLUME DEPLETION: ICD-10-CM

## 2023-09-17 PROBLEM — J96.12 CHRONIC RESPIRATORY FAILURE WITH HYPERCAPNIA (HCC): Status: ACTIVE | Noted: 2023-09-17

## 2023-09-17 LAB
ALBUMIN SERPL-MCNC: 3 G/DL (ref 3.4–5)
ANION GAP SERPL CALCULATED.3IONS-SCNC: 10 MMOL/L (ref 3–16)
ANION GAP SERPL CALCULATED.3IONS-SCNC: 9 MMOL/L (ref 3–16)
BASE EXCESS BLDV CALC-SCNC: 7.6 MMOL/L
BASOPHILS # BLD: 0.2 K/UL (ref 0–0.2)
BASOPHILS NFR BLD: 1 %
BUN SERPL-MCNC: 61 MG/DL (ref 7–20)
BUN SERPL-MCNC: 68 MG/DL (ref 7–20)
CALCIUM SERPL-MCNC: 8.7 MG/DL (ref 8.3–10.6)
CALCIUM SERPL-MCNC: 9 MG/DL (ref 8.3–10.6)
CHLORIDE SERPL-SCNC: 96 MMOL/L (ref 99–110)
CHLORIDE SERPL-SCNC: 97 MMOL/L (ref 99–110)
CO2 BLDV-SCNC: 37 MMOL/L
CO2 SERPL-SCNC: 32 MMOL/L (ref 21–32)
CO2 SERPL-SCNC: 33 MMOL/L (ref 21–32)
COHGB MFR BLDV: 1.4 %
CREAT SERPL-MCNC: 2.2 MG/DL (ref 0.6–1.2)
CREAT SERPL-MCNC: 2.2 MG/DL (ref 0.6–1.2)
DEPRECATED RDW RBC AUTO: 18.2 % (ref 12.4–15.4)
DEPRECATED RDW RBC AUTO: 18.5 % (ref 12.4–15.4)
EOSINOPHIL # BLD: 0.3 K/UL (ref 0–0.6)
EOSINOPHIL NFR BLD: 2.1 %
GFR SERPLBLD CREATININE-BSD FMLA CKD-EPI: 22 ML/MIN/{1.73_M2}
GFR SERPLBLD CREATININE-BSD FMLA CKD-EPI: 22 ML/MIN/{1.73_M2}
GLUCOSE SERPL-MCNC: 126 MG/DL (ref 70–99)
GLUCOSE SERPL-MCNC: 147 MG/DL (ref 70–99)
HCO3 BLDV-SCNC: 35 MMOL/L (ref 23–29)
HCT VFR BLD AUTO: 25.6 % (ref 36–48)
HCT VFR BLD AUTO: 30.4 % (ref 36–48)
HGB BLD-MCNC: 8.5 G/DL (ref 12–16)
HGB BLD-MCNC: 9.8 G/DL (ref 12–16)
LYMPHOCYTES # BLD: 0.4 K/UL (ref 1–5.1)
LYMPHOCYTES NFR BLD: 2.4 %
MAGNESIUM SERPL-MCNC: 2.1 MG/DL (ref 1.8–2.4)
MCH RBC QN AUTO: 28.2 PG (ref 26–34)
MCH RBC QN AUTO: 29.1 PG (ref 26–34)
MCHC RBC AUTO-ENTMCNC: 32.1 G/DL (ref 31–36)
MCHC RBC AUTO-ENTMCNC: 33.2 G/DL (ref 31–36)
MCV RBC AUTO: 87.7 FL (ref 80–100)
MCV RBC AUTO: 87.9 FL (ref 80–100)
METHGB MFR BLDV: 0.6 %
MONOCYTES # BLD: 0.9 K/UL (ref 0–1.3)
MONOCYTES NFR BLD: 5.3 %
NEUTROPHILS # BLD: 14.2 K/UL (ref 1.7–7.7)
NEUTROPHILS NFR BLD: 89.2 %
NT-PROBNP SERPL-MCNC: 5713 PG/ML (ref 0–449)
NT-PROBNP SERPL-MCNC: 7510 PG/ML (ref 0–449)
O2 THERAPY: ABNORMAL
PCO2 BLDV: 65.1 MMHG (ref 40–50)
PH BLDV: 7.34 [PH] (ref 7.35–7.45)
PHOSPHATE SERPL-MCNC: 4.3 MG/DL (ref 2.5–4.9)
PLATELET # BLD AUTO: 149 K/UL (ref 135–450)
PLATELET # BLD AUTO: 153 K/UL (ref 135–450)
PMV BLD AUTO: 9.7 FL (ref 5–10.5)
PMV BLD AUTO: 9.8 FL (ref 5–10.5)
PO2 BLDV: <30 MMHG
POTASSIUM SERPL-SCNC: 3.4 MMOL/L (ref 3.5–5.1)
POTASSIUM SERPL-SCNC: 3.7 MMOL/L (ref 3.5–5.1)
RBC # BLD AUTO: 2.92 M/UL (ref 4–5.2)
RBC # BLD AUTO: 3.46 M/UL (ref 4–5.2)
SAO2 % BLDV: 31 %
SODIUM SERPL-SCNC: 138 MMOL/L (ref 136–145)
SODIUM SERPL-SCNC: 139 MMOL/L (ref 136–145)
TROPONIN, HIGH SENSITIVITY: 90 NG/L (ref 0–14)
WBC # BLD AUTO: 14.5 K/UL (ref 4–11)
WBC # BLD AUTO: 15.9 K/UL (ref 4–11)

## 2023-09-17 PROCEDURE — 6370000000 HC RX 637 (ALT 250 FOR IP): Performed by: INTERNAL MEDICINE

## 2023-09-17 PROCEDURE — 85027 COMPLETE CBC AUTOMATED: CPT

## 2023-09-17 PROCEDURE — 2580000003 HC RX 258: Performed by: HOSPITALIST

## 2023-09-17 PROCEDURE — 96374 THER/PROPH/DIAG INJ IV PUSH: CPT

## 2023-09-17 PROCEDURE — 84484 ASSAY OF TROPONIN QUANT: CPT

## 2023-09-17 PROCEDURE — 94760 N-INVAS EAR/PLS OXIMETRY 1: CPT

## 2023-09-17 PROCEDURE — 1200000000 HC SEMI PRIVATE

## 2023-09-17 PROCEDURE — 94640 AIRWAY INHALATION TREATMENT: CPT

## 2023-09-17 PROCEDURE — 6370000000 HC RX 637 (ALT 250 FOR IP): Performed by: HOSPITALIST

## 2023-09-17 PROCEDURE — 83735 ASSAY OF MAGNESIUM: CPT

## 2023-09-17 PROCEDURE — 93005 ELECTROCARDIOGRAM TRACING: CPT | Performed by: EMERGENCY MEDICINE

## 2023-09-17 PROCEDURE — 2700000000 HC OXYGEN THERAPY PER DAY

## 2023-09-17 PROCEDURE — 82803 BLOOD GASES ANY COMBINATION: CPT

## 2023-09-17 PROCEDURE — 6360000002 HC RX W HCPCS: Performed by: EMERGENCY MEDICINE

## 2023-09-17 PROCEDURE — 99285 EMERGENCY DEPT VISIT HI MDM: CPT

## 2023-09-17 PROCEDURE — 71045 X-RAY EXAM CHEST 1 VIEW: CPT

## 2023-09-17 PROCEDURE — 96375 TX/PRO/DX INJ NEW DRUG ADDON: CPT

## 2023-09-17 PROCEDURE — 6370000000 HC RX 637 (ALT 250 FOR IP): Performed by: EMERGENCY MEDICINE

## 2023-09-17 PROCEDURE — 83880 ASSAY OF NATRIURETIC PEPTIDE: CPT

## 2023-09-17 PROCEDURE — 2060000000 HC ICU INTERMEDIATE R&B

## 2023-09-17 PROCEDURE — 80069 RENAL FUNCTION PANEL: CPT

## 2023-09-17 PROCEDURE — 85025 COMPLETE CBC W/AUTO DIFF WBC: CPT

## 2023-09-17 PROCEDURE — 99233 SBSQ HOSP IP/OBS HIGH 50: CPT | Performed by: NURSE PRACTITIONER

## 2023-09-17 RX ORDER — WATER 1000 ML/1000ML
INJECTION, SOLUTION INTRAVENOUS
Status: DISPENSED
Start: 2023-09-17 | End: 2023-09-18

## 2023-09-17 RX ORDER — IPRATROPIUM BROMIDE AND ALBUTEROL SULFATE 2.5; .5 MG/3ML; MG/3ML
1 SOLUTION RESPIRATORY (INHALATION)
Status: DISCONTINUED | OUTPATIENT
Start: 2023-09-18 | End: 2023-09-17

## 2023-09-17 RX ORDER — IPRATROPIUM BROMIDE AND ALBUTEROL SULFATE 2.5; .5 MG/3ML; MG/3ML
1 SOLUTION RESPIRATORY (INHALATION) ONCE
Status: COMPLETED | OUTPATIENT
Start: 2023-09-17 | End: 2023-09-17

## 2023-09-17 RX ORDER — CLONIDINE HYDROCHLORIDE 0.1 MG/1
0.1 TABLET ORAL 2 TIMES DAILY
Qty: 60 TABLET | Refills: 3 | Status: SHIPPED | OUTPATIENT
Start: 2023-09-17

## 2023-09-17 RX ORDER — CLINDAMYCIN HYDROCHLORIDE 300 MG/1
300 CAPSULE ORAL EVERY 8 HOURS SCHEDULED
Qty: 5 CAPSULE | Refills: 0 | Status: SHIPPED | OUTPATIENT
Start: 2023-09-17 | End: 2023-09-19

## 2023-09-17 RX ORDER — FUROSEMIDE 10 MG/ML
40 INJECTION INTRAMUSCULAR; INTRAVENOUS ONCE
Status: COMPLETED | OUTPATIENT
Start: 2023-09-17 | End: 2023-09-17

## 2023-09-17 RX ORDER — SIMETHICONE 80 MG
80 TABLET,CHEWABLE ORAL EVERY 6 HOURS PRN
Status: DISCONTINUED | OUTPATIENT
Start: 2023-09-17 | End: 2023-09-17 | Stop reason: HOSPADM

## 2023-09-17 RX ORDER — DOFETILIDE 0.12 MG/1
125 CAPSULE ORAL EVERY 12 HOURS SCHEDULED
Qty: 60 CAPSULE | Refills: 3 | Status: SHIPPED | OUTPATIENT
Start: 2023-09-17

## 2023-09-17 RX ORDER — METHYLPREDNISOLONE SODIUM SUCCINATE 125 MG/2ML
125 INJECTION, POWDER, LYOPHILIZED, FOR SOLUTION INTRAMUSCULAR; INTRAVENOUS ONCE
Status: COMPLETED | OUTPATIENT
Start: 2023-09-17 | End: 2023-09-17

## 2023-09-17 RX ORDER — SIMETHICONE 80 MG
80 TABLET,CHEWABLE ORAL EVERY 6 HOURS PRN
Qty: 60 TABLET | Refills: 2 | Status: SHIPPED | OUTPATIENT
Start: 2023-09-17

## 2023-09-17 RX ADMIN — FERROUS SULFATE TAB EC 324 MG (65 MG FE EQUIVALENT) 324 MG: 324 (65 FE) TABLET DELAYED RESPONSE at 08:18

## 2023-09-17 RX ADMIN — ISOSORBIDE MONONITRATE 120 MG: 60 TABLET, EXTENDED RELEASE ORAL at 08:17

## 2023-09-17 RX ADMIN — METHYLPREDNISOLONE SODIUM SUCCINATE 125 MG: 125 INJECTION INTRAMUSCULAR; INTRAVENOUS at 22:53

## 2023-09-17 RX ADMIN — TIOTROPIUM BROMIDE INHALATION SPRAY 2 PUFF: 3.12 SPRAY, METERED RESPIRATORY (INHALATION) at 08:25

## 2023-09-17 RX ADMIN — MOMETASONE FUROATE AND FORMOTEROL FUMARATE DIHYDRATE 2 PUFF: 200; 5 AEROSOL RESPIRATORY (INHALATION) at 08:25

## 2023-09-17 RX ADMIN — POTASSIUM CHLORIDE 40 MEQ: 1500 TABLET, EXTENDED RELEASE ORAL at 08:17

## 2023-09-17 RX ADMIN — OXYCODONE HYDROCHLORIDE AND ACETAMINOPHEN 500 MG: 500 TABLET ORAL at 08:17

## 2023-09-17 RX ADMIN — FUROSEMIDE 40 MG: 10 INJECTION, SOLUTION INTRAMUSCULAR; INTRAVENOUS at 23:01

## 2023-09-17 RX ADMIN — CARVEDILOL 6.25 MG: 6.25 TABLET, FILM COATED ORAL at 08:18

## 2023-09-17 RX ADMIN — SIMETHICONE 80 MG: 80 TABLET, CHEWABLE ORAL at 08:27

## 2023-09-17 RX ADMIN — RIVAROXABAN 15 MG: 15 TABLET, FILM COATED ORAL at 08:17

## 2023-09-17 RX ADMIN — PANTOPRAZOLE SODIUM 40 MG: 40 TABLET, DELAYED RELEASE ORAL at 07:10

## 2023-09-17 RX ADMIN — SODIUM CHLORIDE, PRESERVATIVE FREE 10 ML: 5 INJECTION INTRAVENOUS at 08:18

## 2023-09-17 RX ADMIN — ROSUVASTATIN CALCIUM 10 MG: 10 TABLET, FILM COATED ORAL at 08:17

## 2023-09-17 RX ADMIN — HYDRALAZINE HYDROCHLORIDE 75 MG: 50 TABLET, FILM COATED ORAL at 07:10

## 2023-09-17 RX ADMIN — DOFETILIDE 125 MCG: 0.12 CAPSULE ORAL at 08:17

## 2023-09-17 RX ADMIN — RANOLAZINE 500 MG: 500 TABLET, FILM COATED, EXTENDED RELEASE ORAL at 08:17

## 2023-09-17 RX ADMIN — IPRATROPIUM BROMIDE AND ALBUTEROL SULFATE 1 DOSE: .5; 2.5 SOLUTION RESPIRATORY (INHALATION) at 08:25

## 2023-09-17 RX ADMIN — EMPAGLIFLOZIN 10 MG: 10 TABLET, FILM COATED ORAL at 08:17

## 2023-09-17 RX ADMIN — CLINDAMYCIN HYDROCHLORIDE 300 MG: 150 CAPSULE ORAL at 07:09

## 2023-09-17 RX ADMIN — CLONIDINE HYDROCHLORIDE 0.1 MG: 0.1 TABLET ORAL at 08:17

## 2023-09-17 RX ADMIN — IPRATROPIUM BROMIDE AND ALBUTEROL SULFATE 1 DOSE: 2.5; .5 SOLUTION RESPIRATORY (INHALATION) at 22:25

## 2023-09-17 ASSESSMENT — PAIN SCALES - GENERAL: PAINLEVEL_OUTOF10: 8

## 2023-09-17 ASSESSMENT — PAIN - FUNCTIONAL ASSESSMENT: PAIN_FUNCTIONAL_ASSESSMENT: 0-10

## 2023-09-17 NOTE — PROGRESS NOTES
Hospitalist Progress Note  9/17/2023 8:13 AM  Subjective:   Admit Date: 9/1/2023  PCP: Amanda Godwin MD Status: Inpatient   Interval History: Hospital Day: 17, simethicone for abdominal bloating. History of present illness:  Readmitted with  acute on chronic hypercapnic respiratory failure with COPD exacerbation and acute on chronic diastolic heart failure. Progressive dyspnea, and lower extremity edema. Requiring BiPAP 40% FiO2 and nasal cannula oxygen 4 LPM.  Initiated on furosemide infusion with subsequent acute kidney injury, now held. Recurrent admissions for grade II diastolic heart failure (EF 55-60%), atrial fibrillation s/p ablation / cardioversion, and COPD exacerbations.        Diet: controlled carbohydrate, 2 gram sodium, 1500 ml fluid restriction  Right forearm peripheral IV (9/7, #11)  External urinary catheter (9/2, day #16)  Medications:     potassium chloride  40 mEq Oral BID WC   Ipratropium- albuterol  0.5 / 2.5 mg Inhalation TID    hydralazine  75 mg Oral TID   furosemide  40 mg Oral BID [held]   clindamycin  300 mg Oral TID   clonidine  0.1 mg Oral BID   dofetilide  125 mcg Oral 2 times per day   rosuvastatin  10 mg Oral Daily   rivaroxaban  15 mg Oral Daily with breakfast   carvedilol  6.25 mg Oral BID WC   empagliflozin  10 mg Oral Daily   isosorbide mononitrate  120 mg Oral Daily   pantoprazole  40 mg Oral QAM AC   ranolazine  500 mg Oral BID   vitamin C  500 mg Oral Daily   mometasone-formoterol  2 puff Inhalation BID RT   tiotropium  2 puff Inhalation Daily RT   ferrous sulfate  324 mg Oral BID WC     Recent Labs     09/17/23  0518   WBC 14.5*   HGB 8.5*      MCV 87.7     Recent Labs     09/15/23  0433 09/16/23  0955 09/17/23  0518    135* 138   K 3.5 3.1* 3.4*   CL 95* 93* 96*   CO2 32 30 33*   BUN 63* 62* 68*   CREATININE 1.9* 2.4* 2.2*   GLUCOSE 112* 142* 126*     CRP (9/12) 3.7 mg/L  Procalcitonin (9/12) 0.14 ng/mL  Magnesium (9/17) 2.10 mg/dL  proBNP (9/10) 6,342,

## 2023-09-17 NOTE — DISCHARGE SUMMARY
Hospital Medicine Discharge Summary    Jose Arrieta  :  1945  MRN:  [id number]    Admit date:  2023  Discharge date:  2023    Admitting Physician:  Dina Polanco MD  Primary Care Physician:  Reggie Randall MD  Code Status:   Limited  Status: Inpatient  Discharged Condition: Stable  Activity: activity as tolerated  Diet:  ADULT DIET; Regular; 3 carb choices (45 gm/meal); Low Sodium (2 gm); 1500 ml      Discharge Diagnoses:      COPD exacerbation    Acute on chronic hypercapnic respiratory failure    Hypoxia     Acute suppurative parotitis    Atrial fibrillation    Acute kidney injury on CKD stage 3a    CAD     Hematuria     Labile hypertension     Class III obesity (BMI 43.1)    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 female admitted with acute on chronic hypercapnic respiratory failure with COPD exacerbation and acute on chronic diastolic heart failure. Progressive dyspnea, and lower extremity edema. Requiring BiPAP 40% FiO2 and nasal cannula oxygen 4 LPM.  Initiated on furosemide infusion with subsequent acute kidney injury, now held. Recurrent admissions for grade II diastolic heart failure (EF 55-60%), atrial fibrillation s/p ablation / cardioversion, and COPD exacerbations. Acute on chronic hypercapnic respiratory failure BiPAP 40% FiO2 at 14/8 and nasal cannula oxygen 4 LPM  Acute on chronic diastolic heart failure on furosemide, held for increased creatinine and subsequent acute kidney injury. Parotitis treated with clindamycin. COPD exacerbation:  Systemic steroids with methylprednisolone 40 mg BID, nebulized budesonide and arformoterol tartrate. Nebulized saline. Atrial fibrillation:  Anticoagulation with rivaroxaban 15 mg daily. Rate control with diltiazem and carvedilol. Antiarrhythmic with dofetilide 250 mg BID. Acute kidney injury on CKD stage 3b (eGFR 20).   Renal function creatine increased on IV capsule, Refills: 3      albuterol (ACCUNEB) 0.63 MG/3ML nebulizer solution Take 3 mLs by nebulization every 4 hours as needed for Wheezing      vitamin C (ASCORBIC ACID) 500 MG tablet Take 1 tablet by mouth daily      Multiple Vitamins-Minerals (MULTI FOR HER 50+ PO) Take 1 tablet by mouth daily       ! ! - Potential duplicate medications found. Please discuss with provider. Current Discharge Medication List            Consults:  Pulmonary Critical Care, Infectious Disease, Nephrology, Otolaryngology, Infectious Disease, Heart Failure nurse coordinator, Pharmacy (vancomycin dosing), Dietiician    Significant Diagnostic Studies:    CRP (9/12) 3.7 mg/L  Procalcitonin (9/12) 0.14 ng/mL  Magnesium (9/17) 2.10 mg/dL  proBNP (9/10) 6,342, (9/17) 5,713 pg/mL  Albumin (9/17) 3.0 g/dL  CT soft tissue neck (9/5) Findings consistent with an acute left parotiditis. No abscess or  sialolith is identified. Edematous appearance of the epiglottis and aryepiglottic folds with associated retropharyngeal edema and moderate supraglottic airway narrowing. The findings are suggestive of acute epiglottitis. Parotid ultrasound (9/4) Complex collection adjacent to the right parotid gland may represent an abscess. Cross-sectional imaging with C2 neck with contrast may be helpful for further evaluation. Portable CXR (9/1) No acute cardiopulmonary findings    Treatments:   BiPAP non-invasive ventilation, supplemental oxygen, medication adjustment including diuretic    Disposition:   home with home health, [facility]  Follow up with Ralph Molina MD in 1-2 weeks.       Signed:  Nyasia Freeman MD  9/17/2023, 12:12 PM

## 2023-09-17 NOTE — PROGRESS NOTES
Cardiology - PROGRESS NOTE    Admit Date: 9/1/2023     Reason for follow up:   Adilene Dang is a 69 y.o. female with PMH  CAD, AF s/p PVI ablation 10/2020- recurrent AF with DCCV 1/2021 and repeat AF ablation 2/2021 (failed flecaininde), HTN, aortic aneurysm, AAA s/p repair 3/2018 (Dr. Shannen Tapiaf, DENISE (intolerant to CPAP). Jenita Macon stress>[facility] 3/2018 revealed  of RCA and non obstructive dz of LAD and LCX. Adm to [facility] 5/2022 for dofetilide loading, converted to NSR. Abn Stress followed by [facility] 9/20/2022 showed CAD. Multiple HF/hypoxic resp failure hospitalizations/ re admissions over last year. S/P Cardiomems PA sensor implant 11/2022. Non complaint with readings. GIB 4/2023 & 5/2023 requiring multiple units of PRBC>EGD with gastric AVMs. Admitted 6/25-7/2/2023 AMS, found to have UTI. Mentation improved with treatment of UTI. Cardiology consulted and hypervolemic. IV Lasix was started with improvement in SOB, bloating and edema. Orthopnea. 4L NC     Presented to Dr. Ovidio Baird office with SOB and edema. Sent to ED for decompensated HF. Initial Cardiomems PAD elevated 35. Started on Lasix IV and metolazone- diuresing. Now with Left neck swelling and pain-CT scan revealed parotiditis- improving    Initial CardioMems reading of 35    -14 L     Social History:   reports that she quit smoking about 10 years ago. Her smoking use included cigarettes. She started smoking about 46 years ago. She has a 37.00 pack-year smoking history. She has been exposed to tobacco smoke. She has quit using smokeless tobacco. She reports that she does not drink alcohol and does not use drugs. Family History: family history includes Heart Disease in her father; Hypertension in an other family member.   Living Situation: with spouse       Interval History:   Patient seen and examined and notes reviewed  Wt 239 lb   -14.0 L    I/O do not correlate   PT/OT recommending

## 2023-09-17 NOTE — PROGRESS NOTES
Patient discharged home per doctor order. Discharge medications and instructions were reviewed with patient and family. Patient to follow up with cardiac and pulmonary doctors as outpatient.    Madalyn Spencer

## 2023-09-17 NOTE — CARE COORDINATION
Patient is agreeable to follow up with her outpatient pulmonologist for bi-pap order, patient had initially refused bi-pap when pulmonology was following inpatient. Patient is agreeable to use [address] [place] [address], referral made. Patient will discharge home with home care and follow up with pulmonologist for bi-pap.    Electronically signed by CONNOR Martinez on 9/17/2023 at 2:54 PM

## 2023-09-18 ENCOUNTER — TELEPHONE (OUTPATIENT)
Dept: CARDIOLOGY CLINIC | Age: 78
End: 2023-09-18
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
## 2023-09-18 PROBLEM — R53.81 DEBILITY: Status: ACTIVE | Noted: 2023-09-18

## 2023-09-18 LAB
ANION GAP SERPL CALCULATED.3IONS-SCNC: 12 MMOL/L (ref 3–16)
BASOPHILS # BLD: 0 K/UL (ref 0–0.2)
BASOPHILS NFR BLD: 0.1 %
BUN SERPL-MCNC: 61 MG/DL (ref 7–20)
CALCIUM SERPL-MCNC: 8.8 MG/DL (ref 8.3–10.6)
CHLORIDE SERPL-SCNC: 95 MMOL/L (ref 99–110)
CO2 SERPL-SCNC: 30 MMOL/L (ref 21–32)
CREAT SERPL-MCNC: 1.9 MG/DL (ref 0.6–1.2)
DEPRECATED RDW RBC AUTO: 19.2 % (ref 12.4–15.4)
EKG ATRIAL RATE: 64 BPM
EKG DIAGNOSIS: NORMAL
EKG P AXIS: 98 DEGREES
EKG P-R INTERVAL: 202 MS
EKG Q-T INTERVAL: 440 MS
EKG QRS DURATION: 92 MS
EKG QTC CALCULATION (BAZETT): 453 MS
EKG R AXIS: -14 DEGREES
EKG T AXIS: 29 DEGREES
EKG VENTRICULAR RATE: 64 BPM
EOSINOPHIL # BLD: 0 K/UL (ref 0–0.6)
EOSINOPHIL NFR BLD: 0 %
GFR SERPLBLD CREATININE-BSD FMLA CKD-EPI: 27 ML/MIN/{1.73_M2}
GLUCOSE BLD-MCNC: 135 MG/DL (ref 70–99)
GLUCOSE BLD-MCNC: 146 MG/DL (ref 70–99)
GLUCOSE BLD-MCNC: 170 MG/DL (ref 70–99)
GLUCOSE BLD-MCNC: 181 MG/DL (ref 70–99)
GLUCOSE SERPL-MCNC: 162 MG/DL (ref 70–99)
HCT VFR BLD AUTO: 28.1 % (ref 36–48)
HGB BLD-MCNC: 8.6 G/DL (ref 12–16)
LYMPHOCYTES # BLD: 0.2 K/UL (ref 1–5.1)
LYMPHOCYTES NFR BLD: 1.5 %
MAGNESIUM SERPL-MCNC: 2 MG/DL (ref 1.8–2.4)
MCH RBC QN AUTO: 28.9 PG (ref 26–34)
MCHC RBC AUTO-ENTMCNC: 30.5 G/DL (ref 31–36)
MCV RBC AUTO: 94.6 FL (ref 80–100)
MONOCYTES # BLD: 0.2 K/UL (ref 0–1.3)
MONOCYTES NFR BLD: 1.3 %
NEUTROPHILS # BLD: 16.4 K/UL (ref 1.7–7.7)
NEUTROPHILS NFR BLD: 97.1 %
PERFORMED ON: ABNORMAL
PLATELET # BLD AUTO: 147 K/UL (ref 135–450)
PMV BLD AUTO: 9.7 FL (ref 5–10.5)
POTASSIUM SERPL-SCNC: 3.5 MMOL/L (ref 3.5–5.1)
RBC # BLD AUTO: 2.97 M/UL (ref 4–5.2)
SODIUM SERPL-SCNC: 137 MMOL/L (ref 136–145)
TROPONIN, HIGH SENSITIVITY: 80 NG/L (ref 0–14)
WBC # BLD AUTO: 16.9 K/UL (ref 4–11)

## 2023-09-18 PROCEDURE — 6370000000 HC RX 637 (ALT 250 FOR IP): Performed by: HOSPITALIST

## 2023-09-18 PROCEDURE — 2500000003 HC RX 250 WO HCPCS: Performed by: NURSE PRACTITIONER

## 2023-09-18 PROCEDURE — 83735 ASSAY OF MAGNESIUM: CPT

## 2023-09-18 PROCEDURE — 2580000003 HC RX 258: Performed by: NURSE PRACTITIONER

## 2023-09-18 PROCEDURE — 6370000000 HC RX 637 (ALT 250 FOR IP): Performed by: INTERNAL MEDICINE

## 2023-09-18 PROCEDURE — 97530 THERAPEUTIC ACTIVITIES: CPT | Performed by: PHYSICAL THERAPIST

## 2023-09-18 PROCEDURE — 94640 AIRWAY INHALATION TREATMENT: CPT

## 2023-09-18 PROCEDURE — 1200000000 HC SEMI PRIVATE

## 2023-09-18 PROCEDURE — 97162 PT EVAL MOD COMPLEX 30 MIN: CPT | Performed by: PHYSICAL THERAPIST

## 2023-09-18 PROCEDURE — 80048 BASIC METABOLIC PNL TOTAL CA: CPT

## 2023-09-18 PROCEDURE — 6370000000 HC RX 637 (ALT 250 FOR IP): Performed by: NURSE PRACTITIONER

## 2023-09-18 PROCEDURE — 84484 ASSAY OF TROPONIN QUANT: CPT

## 2023-09-18 PROCEDURE — 97530 THERAPEUTIC ACTIVITIES: CPT

## 2023-09-18 PROCEDURE — 93010 ELECTROCARDIOGRAM REPORT: CPT | Performed by: INTERNAL MEDICINE

## 2023-09-18 PROCEDURE — 97166 OT EVAL MOD COMPLEX 45 MIN: CPT

## 2023-09-18 PROCEDURE — 36415 COLL VENOUS BLD VENIPUNCTURE: CPT

## 2023-09-18 PROCEDURE — 2700000000 HC OXYGEN THERAPY PER DAY

## 2023-09-18 PROCEDURE — 85025 COMPLETE CBC W/AUTO DIFF WBC: CPT

## 2023-09-18 PROCEDURE — 94660 CPAP INITIATION&MGMT: CPT

## 2023-09-18 PROCEDURE — 94761 N-INVAS EAR/PLS OXIMETRY MLT: CPT

## 2023-09-18 RX ORDER — ACETAZOLAMIDE 250 MG/1
125 TABLET ORAL 2 TIMES DAILY
Status: DISCONTINUED | OUTPATIENT
Start: 2023-09-18 | End: 2023-09-19 | Stop reason: HOSPADM

## 2023-09-18 RX ORDER — ACETAMINOPHEN 325 MG/1
650 TABLET ORAL EVERY 6 HOURS PRN
Status: DISCONTINUED | OUTPATIENT
Start: 2023-09-18 | End: 2023-09-19 | Stop reason: HOSPADM

## 2023-09-18 RX ORDER — CLONIDINE HYDROCHLORIDE 0.1 MG/1
0.1 TABLET ORAL 2 TIMES DAILY
Status: DISCONTINUED | OUTPATIENT
Start: 2023-09-18 | End: 2023-09-19 | Stop reason: HOSPADM

## 2023-09-18 RX ORDER — ROSUVASTATIN CALCIUM 10 MG/1
10 TABLET, COATED ORAL DAILY
Status: DISCONTINUED | OUTPATIENT
Start: 2023-09-18 | End: 2023-09-19 | Stop reason: HOSPADM

## 2023-09-18 RX ORDER — PREDNISONE 20 MG/1
20 TABLET ORAL DAILY
Status: DISCONTINUED | OUTPATIENT
Start: 2023-09-18 | End: 2023-09-19 | Stop reason: HOSPADM

## 2023-09-18 RX ORDER — SODIUM CHLORIDE 0.9 % (FLUSH) 0.9 %
5-40 SYRINGE (ML) INJECTION EVERY 12 HOURS SCHEDULED
Status: DISCONTINUED | OUTPATIENT
Start: 2023-09-18 | End: 2023-09-19 | Stop reason: HOSPADM

## 2023-09-18 RX ORDER — PANTOPRAZOLE SODIUM 40 MG/1
40 TABLET, DELAYED RELEASE ORAL
Status: DISCONTINUED | OUTPATIENT
Start: 2023-09-18 | End: 2023-09-19 | Stop reason: HOSPADM

## 2023-09-18 RX ORDER — METOLAZONE 5 MG/1
2.5 TABLET ORAL DAILY PRN
Status: DISCONTINUED | OUTPATIENT
Start: 2023-09-18 | End: 2023-09-19 | Stop reason: HOSPADM

## 2023-09-18 RX ORDER — ACETAMINOPHEN 650 MG/1
650 SUPPOSITORY RECTAL EVERY 6 HOURS PRN
Status: DISCONTINUED | OUTPATIENT
Start: 2023-09-18 | End: 2023-09-19 | Stop reason: HOSPADM

## 2023-09-18 RX ORDER — DILTIAZEM HYDROCHLORIDE 120 MG/1
120 CAPSULE, COATED, EXTENDED RELEASE ORAL DAILY
Status: DISCONTINUED | OUTPATIENT
Start: 2023-09-18 | End: 2023-09-19 | Stop reason: HOSPADM

## 2023-09-18 RX ORDER — INSULIN LISPRO 100 [IU]/ML
0-8 INJECTION, SOLUTION INTRAVENOUS; SUBCUTANEOUS
Status: DISCONTINUED | OUTPATIENT
Start: 2023-09-18 | End: 2023-09-19 | Stop reason: HOSPADM

## 2023-09-18 RX ORDER — SODIUM CHLORIDE 9 MG/ML
INJECTION, SOLUTION INTRAVENOUS PRN
Status: DISCONTINUED | OUTPATIENT
Start: 2023-09-18 | End: 2023-09-19 | Stop reason: HOSPADM

## 2023-09-18 RX ORDER — RANOLAZINE 500 MG/1
500 TABLET, EXTENDED RELEASE ORAL 2 TIMES DAILY
Status: DISCONTINUED | OUTPATIENT
Start: 2023-09-18 | End: 2023-09-19 | Stop reason: HOSPADM

## 2023-09-18 RX ORDER — DEXTROSE MONOHYDRATE 100 MG/ML
INJECTION, SOLUTION INTRAVENOUS CONTINUOUS PRN
Status: DISCONTINUED | OUTPATIENT
Start: 2023-09-18 | End: 2023-09-19 | Stop reason: HOSPADM

## 2023-09-18 RX ORDER — ONDANSETRON 2 MG/ML
4 INJECTION INTRAMUSCULAR; INTRAVENOUS EVERY 6 HOURS PRN
Status: DISCONTINUED | OUTPATIENT
Start: 2023-09-18 | End: 2023-09-18

## 2023-09-18 RX ORDER — ISOSORBIDE MONONITRATE 60 MG/1
120 TABLET, EXTENDED RELEASE ORAL DAILY
Status: DISCONTINUED | OUTPATIENT
Start: 2023-09-18 | End: 2023-09-19 | Stop reason: HOSPADM

## 2023-09-18 RX ORDER — PREDNISONE 10 MG/1
10 TABLET ORAL DAILY
Status: DISCONTINUED | OUTPATIENT
Start: 2023-09-24 | End: 2023-09-18

## 2023-09-18 RX ORDER — ASCORBIC ACID 500 MG
500 TABLET ORAL DAILY
Status: DISCONTINUED | OUTPATIENT
Start: 2023-09-18 | End: 2023-09-19 | Stop reason: HOSPADM

## 2023-09-18 RX ORDER — CARVEDILOL 6.25 MG/1
6.25 TABLET ORAL 2 TIMES DAILY WITH MEALS
Status: DISCONTINUED | OUTPATIENT
Start: 2023-09-18 | End: 2023-09-19 | Stop reason: HOSPADM

## 2023-09-18 RX ORDER — INSULIN LISPRO 100 [IU]/ML
0-4 INJECTION, SOLUTION INTRAVENOUS; SUBCUTANEOUS NIGHTLY
Status: DISCONTINUED | OUTPATIENT
Start: 2023-09-18 | End: 2023-09-19 | Stop reason: HOSPADM

## 2023-09-18 RX ORDER — PREDNISONE 5 MG/1
5 TABLET ORAL DAILY
Status: DISCONTINUED | OUTPATIENT
Start: 2023-09-27 | End: 2023-09-18

## 2023-09-18 RX ORDER — TORSEMIDE 20 MG/1
20 TABLET ORAL DAILY
Status: DISCONTINUED | OUTPATIENT
Start: 2023-09-18 | End: 2023-09-19 | Stop reason: HOSPADM

## 2023-09-18 RX ORDER — FERROUS SULFATE TAB EC 324 MG (65 MG FE EQUIVALENT) 324 (65 FE) MG
325 TABLET DELAYED RESPONSE ORAL 2 TIMES DAILY WITH MEALS
Status: DISCONTINUED | OUTPATIENT
Start: 2023-09-18 | End: 2023-09-19 | Stop reason: HOSPADM

## 2023-09-18 RX ORDER — PREDNISONE 20 MG/1
20 TABLET ORAL DAILY
Status: DISCONTINUED | OUTPATIENT
Start: 2023-09-18 | End: 2023-09-18

## 2023-09-18 RX ORDER — SIMETHICONE 80 MG
80 TABLET,CHEWABLE ORAL EVERY 6 HOURS PRN
Status: DISCONTINUED | OUTPATIENT
Start: 2023-09-18 | End: 2023-09-19 | Stop reason: HOSPADM

## 2023-09-18 RX ORDER — ROSUVASTATIN CALCIUM 20 MG/1
20 TABLET, COATED ORAL DAILY
Status: DISCONTINUED | OUTPATIENT
Start: 2023-09-18 | End: 2023-09-18 | Stop reason: DRUGHIGH

## 2023-09-18 RX ORDER — ONDANSETRON 4 MG/1
4 TABLET, ORALLY DISINTEGRATING ORAL EVERY 8 HOURS PRN
Status: DISCONTINUED | OUTPATIENT
Start: 2023-09-18 | End: 2023-09-19 | Stop reason: HOSPADM

## 2023-09-18 RX ORDER — POLYETHYLENE GLYCOL 3350 17 G/17G
17 POWDER, FOR SOLUTION ORAL DAILY PRN
Status: DISCONTINUED | OUTPATIENT
Start: 2023-09-18 | End: 2023-09-19 | Stop reason: HOSPADM

## 2023-09-18 RX ORDER — DOFETILIDE 0.12 MG/1
125 CAPSULE ORAL EVERY 12 HOURS SCHEDULED
Status: DISCONTINUED | OUTPATIENT
Start: 2023-09-18 | End: 2023-09-19 | Stop reason: HOSPADM

## 2023-09-18 RX ORDER — ALBUTEROL SULFATE 2.5 MG/3ML
0.63 SOLUTION RESPIRATORY (INHALATION) 4 TIMES DAILY PRN
Status: DISCONTINUED | OUTPATIENT
Start: 2023-09-18 | End: 2023-09-19 | Stop reason: HOSPADM

## 2023-09-18 RX ORDER — CLINDAMYCIN HYDROCHLORIDE 150 MG/1
300 CAPSULE ORAL EVERY 8 HOURS SCHEDULED
Status: DISCONTINUED | OUTPATIENT
Start: 2023-09-18 | End: 2023-09-19 | Stop reason: HOSPADM

## 2023-09-18 RX ORDER — HYDRALAZINE HYDROCHLORIDE 50 MG/1
50 TABLET, FILM COATED ORAL EVERY 8 HOURS SCHEDULED
Status: DISCONTINUED | OUTPATIENT
Start: 2023-09-18 | End: 2023-09-19 | Stop reason: HOSPADM

## 2023-09-18 RX ORDER — PREDNISONE 20 MG/1
20 TABLET ORAL DAILY
Qty: 5 TABLET | Refills: 0 | DISCHARGE
Start: 2023-09-19 | End: 2023-09-24

## 2023-09-18 RX ORDER — SODIUM CHLORIDE 0.9 % (FLUSH) 0.9 %
5-40 SYRINGE (ML) INJECTION PRN
Status: DISCONTINUED | OUTPATIENT
Start: 2023-09-18 | End: 2023-09-19 | Stop reason: HOSPADM

## 2023-09-18 RX ADMIN — ACETAZOLAMIDE 125 MG: 250 TABLET ORAL at 09:54

## 2023-09-18 RX ADMIN — HYDRALAZINE HYDROCHLORIDE 50 MG: 50 TABLET, FILM COATED ORAL at 06:23

## 2023-09-18 RX ADMIN — FERROUS SULFATE TAB EC 324 MG (65 MG FE EQUIVALENT) 325 MG: 324 (65 FE) TABLET DELAYED RESPONSE at 17:30

## 2023-09-18 RX ADMIN — DOFETILIDE 125 MCG: 0.12 CAPSULE ORAL at 12:38

## 2023-09-18 RX ADMIN — CLONIDINE HYDROCHLORIDE 0.1 MG: 0.1 TABLET ORAL at 20:53

## 2023-09-18 RX ADMIN — MICONAZOLE NITRATE: 2 POWDER TOPICAL at 09:53

## 2023-09-18 RX ADMIN — TORSEMIDE 20 MG: 20 TABLET ORAL at 09:40

## 2023-09-18 RX ADMIN — MOMETASONE FUROATE AND FORMOTEROL FUMARATE DIHYDRATE 2 PUFF: 200; 5 AEROSOL RESPIRATORY (INHALATION) at 20:38

## 2023-09-18 RX ADMIN — ROSUVASTATIN CALCIUM 10 MG: 10 TABLET, FILM COATED ORAL at 09:39

## 2023-09-18 RX ADMIN — CLONIDINE HYDROCHLORIDE 0.1 MG: 0.1 TABLET ORAL at 09:39

## 2023-09-18 RX ADMIN — CLINDAMYCIN HYDROCHLORIDE 300 MG: 150 CAPSULE ORAL at 06:23

## 2023-09-18 RX ADMIN — HYDRALAZINE HYDROCHLORIDE 50 MG: 50 TABLET, FILM COATED ORAL at 13:24

## 2023-09-18 RX ADMIN — CARVEDILOL 6.25 MG: 6.25 TABLET, FILM COATED ORAL at 09:40

## 2023-09-18 RX ADMIN — FERROUS SULFATE TAB EC 324 MG (65 MG FE EQUIVALENT) 325 MG: 324 (65 FE) TABLET DELAYED RESPONSE at 09:41

## 2023-09-18 RX ADMIN — PANTOPRAZOLE SODIUM 40 MG: 40 TABLET, DELAYED RELEASE ORAL at 06:23

## 2023-09-18 RX ADMIN — CLINDAMYCIN HYDROCHLORIDE 300 MG: 150 CAPSULE ORAL at 13:23

## 2023-09-18 RX ADMIN — EMPAGLIFLOZIN 10 MG: 10 TABLET, FILM COATED ORAL at 09:39

## 2023-09-18 RX ADMIN — ISOSORBIDE MONONITRATE 120 MG: 60 TABLET, EXTENDED RELEASE ORAL at 09:39

## 2023-09-18 RX ADMIN — RIVAROXABAN 15 MG: 15 TABLET, FILM COATED ORAL at 09:40

## 2023-09-18 RX ADMIN — MOMETASONE FUROATE AND FORMOTEROL FUMARATE DIHYDRATE 2 PUFF: 200; 5 AEROSOL RESPIRATORY (INHALATION) at 08:40

## 2023-09-18 RX ADMIN — MICONAZOLE NITRATE: 2 POWDER TOPICAL at 20:53

## 2023-09-18 RX ADMIN — OXYCODONE HYDROCHLORIDE AND ACETAMINOPHEN 500 MG: 500 TABLET ORAL at 09:39

## 2023-09-18 RX ADMIN — TIOTROPIUM BROMIDE INHALATION SPRAY 2 PUFF: 3.12 SPRAY, METERED RESPIRATORY (INHALATION) at 08:41

## 2023-09-18 RX ADMIN — DOFETILIDE 125 MCG: 0.12 CAPSULE ORAL at 22:07

## 2023-09-18 RX ADMIN — PREDNISONE 20 MG: 20 TABLET ORAL at 09:40

## 2023-09-18 RX ADMIN — DILTIAZEM HYDROCHLORIDE 120 MG: 120 CAPSULE, EXTENDED RELEASE ORAL at 09:40

## 2023-09-18 RX ADMIN — HYDRALAZINE HYDROCHLORIDE 50 MG: 50 TABLET, FILM COATED ORAL at 22:07

## 2023-09-18 RX ADMIN — CLONIDINE HYDROCHLORIDE 0.1 MG: 0.1 TABLET ORAL at 03:21

## 2023-09-18 RX ADMIN — ACETAZOLAMIDE 125 MG: 250 TABLET ORAL at 20:54

## 2023-09-18 RX ADMIN — CLINDAMYCIN HYDROCHLORIDE 300 MG: 150 CAPSULE ORAL at 22:07

## 2023-09-18 RX ADMIN — RANOLAZINE 500 MG: 500 TABLET, FILM COATED, EXTENDED RELEASE ORAL at 20:54

## 2023-09-18 RX ADMIN — CARVEDILOL 6.25 MG: 6.25 TABLET, FILM COATED ORAL at 17:30

## 2023-09-18 RX ADMIN — Medication 10 ML: at 09:42

## 2023-09-18 RX ADMIN — RANOLAZINE 500 MG: 500 TABLET, FILM COATED, EXTENDED RELEASE ORAL at 09:39

## 2023-09-18 ASSESSMENT — ENCOUNTER SYMPTOMS: SHORTNESS OF BREATH: 0

## 2023-09-18 NOTE — PROGRESS NOTES
PT placed on the ordered BIPAP settings at this time   09/18/23 0105   NIV Type   $NIV $Daily Charge   Suction Setup and Functional Yes   NIV Started/Stopped On   Equipment Type V60   Mode Bilevel   Mask Type Full face mask   Mask Size Medium   Assessment   Pulse 69   Respirations 14   SpO2 100 %   Level of Consciousness 0   Comfort Level Good   Using Accessory Muscles No   Mask Compliance Good   Skin Assessment Clean, dry, & intact   Skin Protection for O2 Device Yes   Orientation Middle   Location Nose   Intervention(s) Skin Barrier   Breath Sounds   Breath Sounds Bilateral Diminished   Right Upper Lobe Diminished   Right Middle Lobe Diminished   Right Lower Lobe Diminished   Left Upper Lobe Diminished   Left Lower Lobe Diminished   Settings/Measurements   PIP Observed 12 cm H20   IPAP 12 cmH20   CPAP/EPAP 8 cmH2O   Vt (Measured) 447 mL   Rate Ordered 14   Insp Rise Time (%) 3 %   FiO2  30 %   I Time/ I Time % 1 s   Minute Volume (L/min) 10.5 Liters   Mask Leak (lpm) 28 lpm   Patient's Home Machine No   Alarm Settings   Alarms On Y   Low Pressure (cmH2O) 4 cmH2O   High Pressure (cmH2O) 30 cmH2O   Delay Alarm 20 sec(s)   Apnea (secs) 20 secs   RR Low (bpm) 10   RR High (bpm) 40 br/min   Oxygen Therapy/Pulse Ox   O2 Therapy Oxygen   Oxygen Therapy   O2 Device PAP (positive airway pressure)   Pulse Oximeter Device Mode Continuous   OTHER   Pulse Oximeter Device Location Finger

## 2023-09-18 NOTE — ED NOTES
Report given to [place], [place]. SBAR discussed. All questions answered. RN verbalized understanding.      Lucero Villalobos RN  09/18/23 0003

## 2023-09-18 NOTE — DISCHARGE INSTRUCTIONS
Extra Heart Failure sites:     https://[employer]. RegistryLove/publication/?w=107211  --- this is American Heart Association Interactive Healthier Living with Heart Failure guidebook. Please click hyperlink or copy / paste link into search bar. Use your mouse to scroll through the pages. Lots of information about weight monitoring, diet tips, activity, meds, etc    HF [place] maxwell -- this is a free smart phone maxwell available for iPhone and Android download. Use your phone to track sodium / fluid intake, zone tool symptom tracking, weights, medications, etc. Click on this hyperlink  HF [place]   for QR code for easy download--.look for this in your maxwell store                                          DASH (Dietary Approach to Stop Hypertension) diet --  SeekAl[employer].no -- this diet is a flexible eating plan that promotes heart healthy eating style. Click on hyperlink or copy / paste link into search bar. Lots of low sodium recipes and tips.     CigarRepair.ca  -- more free recipes

## 2023-09-18 NOTE — PLAN OF CARE
Problem: Discharge Planning  Goal: Discharge to home or other facility with appropriate resources  Outcome: Progressing     Problem: Safety - Adult  Goal: Free from fall injury  Outcome: Progressing     Problem: Pain  Goal: Verbalizes/displays adequate comfort level or baseline comfort level  Outcome: Progressing     Problem: Respiratory - Adult  Goal: Achieves optimal ventilation and oxygenation  Outcome: Progressing     Problem: Cardiovascular - Adult  Goal: Maintains optimal cardiac output and hemodynamic stability  Outcome: Progressing     Problem: Skin/Tissue Integrity - Adult  Goal: Skin integrity remains intact  Outcome: Progressing     Problem: Musculoskeletal - Adult  Goal: Return mobility to safest level of function  Outcome: Progressing     Problem: Metabolic/Fluid and Electrolytes - Adult  Goal: Electrolytes maintained within normal limits  Outcome: Progressing  Goal: Hemodynamic stability and optimal renal function maintained  Outcome: Progressing  Goal: Glucose maintained within prescribed range  Outcome: Progressing     Problem: Chronic Conditions and Co-morbidities  Goal: Patient's chronic conditions and co-morbidity symptoms are monitored and maintained or improved  Outcome: Progressing     Problem: Skin/Tissue Integrity  Goal: Absence of new skin breakdown  Description: 1. Monitor for areas of redness and/or skin breakdown  2. Assess vascular access sites hourly  3. Every 4-6 hours minimum:  Change oxygen saturation probe site  4. Every 4-6 hours:  If on nasal continuous positive airway pressure, respiratory therapy assess nares and determine need for appliance change or resting period.   Outcome: Progressing

## 2023-09-18 NOTE — ED TRIAGE NOTES
Pt arrived from home via ems for a c/c of falls. Pt from home. Pt was recently d/c from hospital for intestinal bleed for which she had blood transfusions. Pt has had increased weakness and has been having multiple falls at home. No known injuries and on xarelto Only chief complaint is for sore and swollen legs. Pt wears 4L O2 at baseline. VS noted and stable except bp (112/98). Patient A&Ox4. Respirations easy and unlabored. Skin warm and dry and appropriate for ethnicity. No acute distress noted at this time. Patient placed on continuous telemetry and pulse ox upon arrival to room.

## 2023-09-18 NOTE — CARE COORDINATION
DISCHARGE PLANNING:    Spoke with Stephanie Salinas at [facility], they are able to accept the patient. Patient can transport 9/19/23. Will notify family at bedside. No precert required. Will need transport.     #[phone]  Electronically signed by Rani Allen RN on 9/18/2023 at 2:21 PM

## 2023-09-18 NOTE — TELEPHONE ENCOUNTER
----- Message from CHRISTINE Singh CNP sent at 9/17/2023  2:27 PM EDT -----  Below instructions given to pt :  Please call and review instructions with      Please confirm he is able to get her to the appt at [place] [facility]        Patient is scheduled to see Cardiology on 9/21/23  Continue lasix BID  Would give metolazone 2.5 mg on Wed 9/20/23 30 min prior to lasix  Check renal fx at OV

## 2023-09-18 NOTE — CARE COORDINATION
Case Management Assessment  Initial Evaluation    Date/Time of Evaluation: 9/18/2023 11:27 AM  Assessment Completed by: Jesus Hernandez RN    If patient is discharged prior to next notation, then this note serves as note for discharge by case management. Patient Name: Melida Salinas                   YOB: 1945  Diagnosis: COPD exacerbation [facility]) [J44.1]                   Date / Time: 9/17/2023  9:21 PM    Patient Admission Status: Inpatient   Readmission Risk (Low < 19, Mod (19-27), High > 27): Readmission Risk Score: 39    Current PCP: Sue Melendez MD  PCP verified by CM? Yes    Chart Reviewed: Yes      History Provided by: Patient, Medical Record  Patient Orientation: Alert and Oriented    Patient Cognition: Alert    Hospitalization in the last 30 days (Readmission):  Yes    If yes, Readmission Assessment in CM Navigator will be completed. Advance Directives:      Code Status: Limited   Patient's Primary Decision Maker is: Named in [facility]    Primary Decision Maker: Stephanie Murphy Spouse - [phone]    Secondary Decision Maker:  Madelin Robertson Child - [phone]    Discharge Planning:    Patient lives with: Spouse/Significant Other Type of Home: Trailer/Mobile Home  Primary Care Giver: Spouse  Patient Support Systems include: Spouse/Significant Other, Children   Current Financial resources: Medicare  Current community resources: ECF/Home Care  Current services prior to admission: Durable Medical Equipment, Oxygen Therapy, Home Care ([address], O2 through [place])            Current DME: Devan Sims, Wheelchair, Oxygen Therapy (Comment), Home Aerosol            Type of Home Care services:  OT, PT, Nursing Services    ADLS  Prior functional level: Assistance with the following:, Bathing, Dressing, Toileting, Feeding, Cooking, Housework, Shopping, Mobility  Current functional level: Assistance with the following:, Bathing, Dressing, Toileting, Feeding, Cooking, Housework, to: Patient          The Patient and/or Patient Representative Agree with the Discharge Plan?  Yes    Cici Kingston RN  Case Management Department  Ph: [phone]

## 2023-09-18 NOTE — PROGRESS NOTES
- CNP  Subjective  Subjective: Pt seen bedside and agreeable to therapy. General Comment  Comments: Per RN ok for therapy     Social/Functional History  Social/Functional History  Lives With: Spouse  Type of Home: Mobile home  Home Layout: One level  Home Access: Stairs to enter without rails  Entrance Stairs - Number of Steps: 1 small step  Bathroom Shower/Tub: Tub/Shower unit (walk in tub)  Bathroom Toilet: Bedside commode  Bathroom Equipment: Built-in shower seat, Hand-held shower, 3-in-1 commode, Grab bars in shower  Home Equipment: Redell Kelp, 4 wheeled, Oxygen, Wheelchair-manual, Lift chair  Has the patient had two or more falls in the past year or any fall with injury in the past year?: Yes  ADL Assistance: Needs assistance (spouse assists with all ADLs)  Ambulation Assistance: Independent (short distances with 9QC)  Transfer Assistance: Independent  Active : No  Additional Comments: Pt with multiple hospital admissions this year- most recently 7/31-8/9, and 9/1-9/17 and discharged home despite therapy recs for SNF. Objective        Safety Devices  Type of Devices: Call light within reach; Chair alarm in place; Left in chair;Nurse notified;Gait belt;Patient at risk for falls; All fall risk precautions in place           ADL  Grooming:  (assisted with combing hair)  Toileting:  (Ephriam Silk in place)  Additional Comments: Anticipate pt will be max A for LB bathing/dressing; min A for UB bathing/dressing and grooming when seated based on balance and endurance observed        Bed mobility  Supine to Sit: Moderate assistance (HOB elevated)  Sit to Supine:  (pt in chair at end of session)    Transfers  Sit to stand: Moderate assistance  Stand to sit: Moderate assistance  Transfer Comments: Pt stood from bed > STEDY to transfer to chair.  Pt with significant fear of falling and anxious with all movement    Vision  Vision: Within Functional Limits  Hearing  Hearing: Within functional limits    Cognition  Overall Cognitive Status: WFL  Cognition Comment: very anxious and self limiting at times  Orientation  Overall Orientation Status: Within Functional Limits        Education Given To: Patient  Education Provided: Role of Therapy;Plan of Care;Transfer Training  Education Method: Demonstration;Verbal  Barriers to Learning: None  Education Outcome: Verbalized understanding;Demonstrated understanding;Continued education needed    LUE AROM (degrees)  LUE AROM : WFL  Left Hand AROM (degrees)  Left Hand AROM: WFL  RUE AROM (degrees)  RUE AROM : WFL  Right Hand AROM (degrees)  Right Hand AROM: [facility]    AM-PAC Score  AM-PAC Inpatient Daily Activity Raw Score: 15 (09/18/23 0809)  AM-PAC Inpatient ADL T-Scale Score : 34.69 (09/18/23 0809)  ADL Inpatient CMS 0-100% Score: 56.46 (09/18/23 0809)  ADL Inpatient CMS G-Code Modifier : CK (09/18/23 0809)    Goals  Short Term Goals  Time Frame for Short Term Goals: Prior to DC: Short Term Goal 1: Pt will complete ADL transfers/mobility with CGA  Short Term Goal 2: Pt will tolerate standing > 2 min for functional task with CGA-goal met  Short Term Goal 3: Pt will complete LB Dressing with min A. Short Term Goal 4: Pt will complete toileting with min A  Short Term Goal 5: Pt will complete UE exercises in all planes to inc strength/endurance for ADLs. Patient Goals   Patient goals : to return home       Therapy Time   Individual Concurrent Group Co-treatment   Time In 0730         Time Out 0808         Minutes 38         Timed Code Treatment Minutes: 23 Minutes     This note to serve as OT d/c summary if pt is d/c-ed prior to next therapy session.     Margie Zamora OTR/L

## 2023-09-18 NOTE — H&P
member.   Soc HX:   Social History     Socioeconomic History    Marital status:      Spouse name: Linda    Number of children: 1    Years of education: None    Highest education level: None   Occupational History    Occupation: housewife   Tobacco Use    Smoking status: Former     Packs/day: 1.00     Years: 37.00     Additional pack years: 0.00     Total pack years: 37.00     Types: Cigarettes     Start date: 12/2/1976     Quit date: 9/17/2013     Years since quitting: 10.0     Passive exposure: Past    Smokeless tobacco: Former    Tobacco comments:     E cigarette now and then   Vaping Use    Vaping Use: Never used   Substance and Sexual Activity    Alcohol use: No    Drug use: No    Sexual activity: Yes     Partners: Male     Social Determinants of Health     Financial Resource Strain: Low Risk  (4/6/2023)    Overall Financial Resource Strain (CARDIA)     Difficulty of Paying Living Expenses: Not hard at all   Food Insecurity: No Food Insecurity (4/6/2023)    Hunger Vital Sign     Worried About Running Out of Food in the Last Year: Never true     Ran Out of Food in the Last Year: Never true   Transportation Needs: Unknown (4/6/2023)    PRAPARE - Transportation     Lack of Transportation (Non-Medical): No   Housing Stability: Unknown (4/6/2023)    Housing Stability Vital Sign     Unstable Housing in the Last Year: No       Medications:   Medications:    acetaZOLAMIDE  125 mg Oral BID    carvedilol  6.25 mg Oral BID WC    clindamycin  300 mg Oral 3 times per day    cloNIDine  0.1 mg Oral BID    dilTIAZem  120 mg Oral Daily    empagliflozin  10 mg Oral Daily    ferrous sulfate  325 mg Oral BID    fluticasone-umeclidin-vilant  1 puff Inhalation Daily    isosorbide mononitrate  120 mg Oral Daily    miconazole   Topical BID    rivaroxaban  15 mg Oral Daily with breakfast    rosuvastatin  20 mg Oral Daily    tiotropium  2 puff Inhalation Daily RT    torsemide  20 mg Oral Daily    vitamin C  500 mg Oral Daily

## 2023-09-18 NOTE — PROGRESS NOTES
Clinical Pharmacy Note  Renal Dose Adjustment    Love Thuy is receiving rosuvastatin. This medication is renally eliminated. Based on the patient's Estimated Creatinine Clearance: 26 mL/min (A) (based on SCr of 2.2 mg/dL (H)). and urine output, the dose has been adjusted to rosuvastatin 10mg PO Q24H per protocol. Pharmacy will continue to monitor and adjust dose as needed for changes in renal function.       Arden Bauer, PharmD, BCPS  9/18/2023 1:33 AM

## 2023-09-18 NOTE — DISCHARGE INSTR - COC
Continuity of Care Form    Patient Name: Сергей Crao   :  1945  MRN:  [id number]    Admit date:  2023  Discharge date:  2023    Code Status Order: Limited   Advance Directives:     Admitting Physician:  Lady Kawasaki, MD  PCP: Carlos Vincent MD    [facility] T[facility]M-2916/5122-01  Discharging Unit Phone Number: [phone]    Emergency Contact:   Extended Emergency Contact Information  Primary Emergency Contact: Linda Perez  Address: [address] [place] [address] [employer] [place] [address] [address] [place] Phone: [phone]  Relation: Spouse  Secondary Emergency Contact: Katie [place] [address] [address] [place] Phone: [phone]  Mobile Phone: [phone]  Relation: Child    Past Surgical History:  Past Surgical History:   Procedure Laterality Date    ABDOMINAL AORTIC ANEURYSM REPAIR      Endovascular abdominal AA    APPENDECTOMY      incidental    BLADDER SUSPENSION      CAPSULE ENDOSCOPY N/A 2023    ESOPHAGEAL CAPSULE ENDOSCOPY performed by Soham Parrish MD at [facility] Right 2022    Cardiomems PA sensor device implant Left PA    [address] [facility] [place]  10/15/2013    COLECTOMY N/A 2023    LAPAROSCOPIC ASSISTED LYSIS OF ADHESIONS AND PUSH ENTEROSCOPY performed by Yanique Olivia MD at [address] [place] [address]  2007    dr Betty Landon and check in 5 years. COLONOSCOPY  2017    ok dr arndt, repeat 5 years    COLONOSCOPY N/A 5/10/2023    COLONOSCOPY performed by Malcolm Ramos MD at [address] [place] [address] ([address] [place] [address])      for benign tumor.  just the uterus    JOINT REPLACEMENT  2013    right knee replacement    TONSILLECTOMY  as a child    TUMOR EXCISION      benign behind right ear about     UPPER GASTROINTESTINAL ENDOSCOPY N/A 2023    EGD CONTROL HEMORRHAGE WITH APC TO AVM performed by

## 2023-09-18 NOTE — PROGRESS NOTES
4 Eyes Skin Assessment     NAME:  Bishop Dakins  YOB: 1945  MEDICAL RECORD NUMBER:  [id number]    The patient is being assessed for  Admission    I agree that at least one RN has performed a thorough Head to Toe Skin Assessment on the patient. ALL assessment sites listed below have been assessed. Areas assessed by both nurses:    Head, Face, Ears, Shoulders, Back, Chest, Arms, Elbows, Hands, Sacrum. Buttock, Coccyx, Ischium, Legs. Feet and Heels, and Under Medical Devices         Does the Patient have a Wound? Yes wound(s) were present on assessment.  LDA wound assessment was Initiated and completed by RN       Braydon Prevention initiated by RN: No  Wound Care Orders initiated by RN: No    Pressure Injury (Stage 3,4, Unstageable, DTI, NWPT, and Complex wounds) if present, place Wound referral order by RN under : No    New Ostomies, if present place, Ostomy referral order under : No     Nurse 1 eSignature: Electronically signed by Barrett Stratton RN on 9/18/23 at 5:47 AM EDT    **SHARE this note so that the co-signing nurse can place an eSignature**    Nurse 2 eSignature: Electronically signed by Perry Stein RN on 9/18/23 at 5:52 AM EDT

## 2023-09-18 NOTE — CARE COORDINATION
09/18/23 1119   Readmission Assessment   Number of Days since last admission? 1-7 days   Previous Disposition Home with Home Health   Who is being Interviewed Patient   What was the patient's/caregiver's perception as to why they think they needed to return back to the hospital? Other (Comment); Did not realize care needs would be so extensive  (SOB and falls)   Did you visit your Primary Care Physician after you left the hospital, before you returned this time? No   Why weren't you able to visit your PCP? Did not have an appointment   Did you see a specialist, such as Cardiac, Pulmonary, Orthopedic Physician, etc. after you left the hospital? No   Who advised the patient to return to the hospital? Self-referral   Does the patient report anything that got in the way of taking their medications? No   In our efforts to provide the best possible care to you and others like you, can you think of anything that we could have done to help you after you left the hospital the first time, so that you might not have needed to return so soon?  Other (Comment)  (i don't know)     #[phone]  Electronically signed by Sherley Claude, RN on 9/18/2023 at 11:20 AM

## 2023-09-18 NOTE — ED PROVIDER NOTES
I PERSONALLY SAW THE PATIENT AND PERFORMED A SUBSTANTIVE PORTION OF THE VISIT INCLUDING ALL ASPECTS OF THE MEDICAL DECISION MAKING PROCESS. [address] [facility] [address] [address]      Pt Name: Margarita Claudio  MRN: [id number]  [address] [facility] 1945  Date of evaluation: 9/17/2023  Provider: Paloma Santoro MD    CHIEF COMPLAINT       Chief Complaint   Patient presents with    Fall     Pt from home. Pt was recently d/c from hospital for intestinal bleed for which she had blood transfusions. Pt has had increased weakness and has been having multiple falls at home. No known injuries and on xarelto Only chief complaint is for sore and swollen legs. Pt wears 4L O2 at baseline. HISTORY OF PRESENT ILLNESS    Margarita Claudio is a 69 y.o. female who presents to the emergency department with shortness of breath. Patient presents with shortness of breath from home. Patient was just discharged in the hospital today. Patient has COPD and CHF. Was admitted and diuresed here. Patient states she got home had multiple falls secondary to shortness of breath. Does not feel that she can take care of her self. No chest pain. No abdominal pain. No other associated symptoms. No back pain. No fevers or chills. Patient is on 4 L nasal cannula at baseline. Patient does not endorse any worsening leg swelling. No joint pain. No muscle aches. Patient denies constipation. Patient denies sore throat. Patient denies difficulty swallowing. Patient denies tooth pain. Patient denies heat intolerance. Patient denies blood in stool. Nursing Notes were reviewed. Including nursing noted for FM, Surgical History, Past Medical History, Social History, vitals, and allergies; agree with all. REVIEW OF SYSTEMS       Review of Systems    Except as noted above the remainder of the review of systems was reviewed and negative.      PAST MEDICAL HISTORY     Past Medical History:   Diagnosis Date mg (DUONEB) nebulizer solution 1 Dose     Order Specific Question:   Initiate RT Bronchodilator Protocol     Answer:   Yes - Inpatient Protocol    methylPREDNISolone sodium succ (SOLU-MEDROL) injection 125 mg    sterile water injection     Keyshawn Maya: cabinet override    ipratropium 0.5 mg-albuterol 2.5 mg (DUONEB) nebulizer solution 1 Dose     Order Specific Question:   Initiate RT Bronchodilator Protocol     Answer:   Yes - Inpatient Protocol    furosemide (LASIX) injection 40 mg       I PERSONALLY SAW THE PATIENT AND PERFORMED A SUBSTANTIVE PORTION OF THE VISIT INCLUDING ALL ASPECTS OF THE MEDICAL DECISION MAKING PROCESS. The primary clinician of record [id number]   Total Critical Caretime was 49 minutes, excluding separately reportable procedures. There was a high probability of clinically significant/life threatening deterioration in the patient's condition which required my urgent intervention. CRITICAL CARE  I personally saw the patient and independently provided 49 minutes of non-concurrent critical care out of the total shared critical care time provided. This excludes seperately billable procedures. Critical care time was provided for patient as above that required close evaluation and/or intervention with concern for potential patient decompensation. PROCEDURES:  Unlessotherwise noted below, none    FINAL IMPRESSION      1. COPD exacerbation ([address])          DISPOSITION/PLAN   DISPOSITION Decision To Admit 09/17/2023 10:55:13 PM    PATIENT REFERRED TO:  No follow-up provider specified.     DISCHARGE MEDICATIONS:  New Prescriptions    No medications on file          (Please note that portions ofthis note were completed with a voice recognition program.  Efforts were made to edit the dictations but occasionally words are mis-transcribed.)    Jose Winters MD(electronically signed)  Attending Emergency Physician            Jose Winters MD  09/17/23 7165

## 2023-09-18 NOTE — ED NOTES
Patient has had a large bowel movement that she was unaware of. Patient cleaned and fresh linen placed under patient. Patient has also been given Lasix and Pure-wick placed after patient clean.      Marcio Larose RN  09/17/23 [id number]

## 2023-09-18 NOTE — PROGRESS NOTES
Physical Therapy  Facility/Department: [facility] PROGRESSIVE CARE  Physical Therapy Initial Assessment    Name: Rohan Akbar  : 1945  MRN: [id number]  Date of Service: 2023    Discharge Recommendations:  Subacute/Skilled Nursing Facility   PT Equipment Recommendations  Equipment Needed: No      Rohan Akbar scored a 10/24 on the AM-PAC short mobility form. Current research shows that an AM-PAC score of 17 or less is typically not associated with a discharge to the patient's home setting. Based on the patient's AM-PAC score and their current functional mobility deficits, it is recommended that the patient have 3-5 sessions per week of Physical Therapy at d/c to increase the patient's independence. Please see assessment section for further patient specific details. If patient discharges prior to next session this note will serve as a discharge summary. Please see below for the latest assessment towards goals. Patient Diagnosis(es): The encounter diagnosis was COPD exacerbation ([address]). Past Medical History:  has a past medical history of AAA (abdominal aortic aneurysm) ([address]), AAA (abdominal aortic aneurysm) without rupture (HCC), Atrial fibrillation ([address]), CAD (coronary artery disease), CHF (congestive heart failure) ([address]), COPD (chronic obstructive pulmonary disease) ([address]), History of blood clots, Hyperlipidemia, and Hypertension. Past Surgical History:  has a past surgical history that includes Colonoscopy (2007); Hysterectomy (); Appendectomy (); bladder suspension; Cataract removal; Tonsillectomy (as a child); tumor excision; Cholecystectomy (10/15/2013); Colonoscopy (2017); joint replacement (2013); Abdominal aortic aneurysm repair; Cardiac surgery; Cardiac catheterization (Right, 2022); Upper gastrointestinal endoscopy (N/A, 2023); Colonoscopy (N/A, 5/10/2023); Capsule endoscopy (N/A, 2023);  Upper gastrointestinal endoscopy (N/A, 2023); and

## 2023-09-18 NOTE — ACP (ADVANCE CARE PLANNING)
Advance Care Planning     Advance Care Planning Activator (Inpatient)  Conversation Note      Date of ACP Conversation: 9/18/2023     Conversation Conducted with: Patient with Decision Making Capacity    ACP Activator: Odette Mae RN    Health Care Decision Maker:     Current Designated Health Care Decision Maker:     Primary Decision Maker: Chrissy Henriquez Spouse - [phone]    Secondary Decision Maker: Shauna Lantigua - Child - [phone]    Today we documented Decision Maker(s) consistent with ACP documents on file. Care Preferences    Ventilation: \"If you were in your present state of health and suddenly became very ill and were unable to breathe on your own, what would your preference be about the use of a ventilator (breathing machine) if it were available to you? \"      Would the patient desire the use of ventilator (breathing machine)?: no    \"If your health worsens and it becomes clear that your chance of recovery is unlikely, what would your preference be about the use of a ventilator (breathing machine) if it were available to you? \"     Would the patient desire the use of ventilator (breathing machine)?: No      Resuscitation  \"CPR works best to restart the heart when there is a sudden event, like a heart attack, in someone who is otherwise healthy. Unfortunately, CPR does not typically restart the heart for people who have serious health conditions or who are very sick. \"    \"In the event your heart stopped as a result of an underlying serious health condition, would you want attempts to be made to restart your heart (answer \"yes\" for attempt to resuscitate) or would you prefer a natural death (answer \"no\" for do not attempt to resuscitate)? \" no       [] Yes   [] No   Educated Patient / Justice Johnstonhe regarding differences between Advance Directives and portable DNR orders.     Length of ACP Conversation in minutes:      Conversation Outcomes:  ACP discussion completed    Follow-up plan:    []

## 2023-09-18 NOTE — CONSULTS
PALLIATIVE MEDICINE CONSULTATION     Patient name:Janae Mccormick   HFF:[id number]    :1945  Room/Bed:S2O-0792/5122-01   LOS: 1 day         Date of consult:2023    Inpatient consult to Palliative Care  Consult performed by: CHRISTINE Freire - CNP  Consult ordered by: Levon Simmonds, MD  Reason for consult: Goals of care        ASSESSMENT/RECOMMENDATIONS     69 y.o. female with combined heart failure, COPD, MARK on CKD, and increased weakness with frequent hospitalizations. Symptom Management:  Chronic respiratory failure with hypoxia - Oxygen dependent, on 4 liters. BiPAP in the room, will continue to utilize as needed. Has been recommended for home use in the past. COPD and CHF likely contributing. Management per attending. Debility - PT/OT ordered. Patient and  have refused SNF in the past, now agreeable. Discussed likely need to transition to LTC. Goals of Care - See below. Patient/Family Goals of Care :    Patient and  are very well known to this provider. We have had several goals of care conversations throughout her frequent hospitalizations. They have declined in home assistance and placement in the past. During the prior admission, they were agreeable to outpatient [address] [place] [address] for assistance.  is very frustrated with the situation, feels as though the patient wasn't \"trying\". Mr. Demario Mccormick has made several adjustments to the home in order for the patient to remain there. They are now both agreeable to placement. We did discuss the patient returning to her home under the care of hospice services. Mr. Demario Mccormick states, \"I'm the closest to that than I've ever been\". He would like to attempt rehab prior to making the decision. The goal is for her to return home, however understand that long term placement is a high potential. Code status verified. Disposition/Discharge Plan:   Agreeable to SNF.    An outpatient [facility] referral was previously

## 2023-09-19 VITALS
BODY MASS INDEX: 42.54 KG/M2 | HEART RATE: 54 BPM | DIASTOLIC BLOOD PRESSURE: 55 MMHG | WEIGHT: 240.08 LBS | HEIGHT: 63 IN | OXYGEN SATURATION: 91 % | RESPIRATION RATE: 16 BRPM | TEMPERATURE: 98.4 F | SYSTOLIC BLOOD PRESSURE: 143 MMHG

## 2023-09-19 LAB
GLUCOSE BLD-MCNC: 111 MG/DL (ref 70–99)
PERFORMED ON: ABNORMAL

## 2023-09-19 PROCEDURE — 6370000000 HC RX 637 (ALT 250 FOR IP): Performed by: INTERNAL MEDICINE

## 2023-09-19 PROCEDURE — 2700000000 HC OXYGEN THERAPY PER DAY

## 2023-09-19 PROCEDURE — 6370000000 HC RX 637 (ALT 250 FOR IP): Performed by: HOSPITALIST

## 2023-09-19 PROCEDURE — 94760 N-INVAS EAR/PLS OXIMETRY 1: CPT

## 2023-09-19 PROCEDURE — 94640 AIRWAY INHALATION TREATMENT: CPT

## 2023-09-19 PROCEDURE — 6370000000 HC RX 637 (ALT 250 FOR IP): Performed by: NURSE PRACTITIONER

## 2023-09-19 RX ADMIN — MOMETASONE FUROATE AND FORMOTEROL FUMARATE DIHYDRATE 2 PUFF: 200; 5 AEROSOL RESPIRATORY (INHALATION) at 08:04

## 2023-09-19 RX ADMIN — PREDNISONE 20 MG: 20 TABLET ORAL at 08:14

## 2023-09-19 RX ADMIN — CLINDAMYCIN HYDROCHLORIDE 300 MG: 150 CAPSULE ORAL at 05:18

## 2023-09-19 RX ADMIN — MICONAZOLE NITRATE: 2 POWDER TOPICAL at 08:15

## 2023-09-19 RX ADMIN — DOFETILIDE 125 MCG: 0.12 CAPSULE ORAL at 08:19

## 2023-09-19 RX ADMIN — DILTIAZEM HYDROCHLORIDE 120 MG: 120 CAPSULE, EXTENDED RELEASE ORAL at 08:14

## 2023-09-19 RX ADMIN — CARVEDILOL 6.25 MG: 6.25 TABLET, FILM COATED ORAL at 08:14

## 2023-09-19 RX ADMIN — TIOTROPIUM BROMIDE INHALATION SPRAY 2 PUFF: 3.12 SPRAY, METERED RESPIRATORY (INHALATION) at 08:02

## 2023-09-19 RX ADMIN — TORSEMIDE 20 MG: 20 TABLET ORAL at 08:14

## 2023-09-19 RX ADMIN — ACETAZOLAMIDE 125 MG: 250 TABLET ORAL at 08:15

## 2023-09-19 RX ADMIN — OXYCODONE HYDROCHLORIDE AND ACETAMINOPHEN 500 MG: 500 TABLET ORAL at 08:14

## 2023-09-19 RX ADMIN — RANOLAZINE 500 MG: 500 TABLET, FILM COATED, EXTENDED RELEASE ORAL at 08:14

## 2023-09-19 RX ADMIN — PANTOPRAZOLE SODIUM 40 MG: 40 TABLET, DELAYED RELEASE ORAL at 05:18

## 2023-09-19 RX ADMIN — FERROUS SULFATE TAB EC 324 MG (65 MG FE EQUIVALENT) 325 MG: 324 (65 FE) TABLET DELAYED RESPONSE at 08:14

## 2023-09-19 RX ADMIN — ISOSORBIDE MONONITRATE 120 MG: 60 TABLET, EXTENDED RELEASE ORAL at 08:14

## 2023-09-19 RX ADMIN — HYDRALAZINE HYDROCHLORIDE 50 MG: 50 TABLET, FILM COATED ORAL at 05:18

## 2023-09-19 RX ADMIN — CLONIDINE HYDROCHLORIDE 0.1 MG: 0.1 TABLET ORAL at 08:14

## 2023-09-19 RX ADMIN — ROSUVASTATIN CALCIUM 10 MG: 10 TABLET, FILM COATED ORAL at 08:14

## 2023-09-19 RX ADMIN — RIVAROXABAN 15 MG: 15 TABLET, FILM COATED ORAL at 08:14

## 2023-09-19 RX ADMIN — EMPAGLIFLOZIN 10 MG: 10 TABLET, FILM COATED ORAL at 08:14

## 2023-09-19 ASSESSMENT — PAIN SCALES - WONG BAKER
WONGBAKER_NUMERICALRESPONSE: 0
WONGBAKER_NUMERICALRESPONSE: 0

## 2023-09-19 ASSESSMENT — PAIN SCALES - GENERAL: PAINLEVEL_OUTOF10: 0

## 2023-09-19 NOTE — PROGRESS NOTES
RN called Nolan Munguia to give report to [employer], all questions answered. Transport is scheduled between 12p-1230p today.     Electronically signed by Maria G Irizarry RN on 9/19/2023 at 11:00 AM

## 2023-09-19 NOTE — PROGRESS NOTES
Discharge orders acknowledged by RN . Pt discharging to SNF. Follow up appointments reviewed with pt and resources given for discharge. Purewick removed, Bedside monitor removed from pt. Pt vitals WNL. Pt discharged with all belongings to SNF. Pt transported off of unit via stretcher and CMT. No complications.       Electronically signed by Jorgito Carrasco RN on 9/19/2023 at 12:56 PM

## 2023-09-19 NOTE — DISCHARGE SUMMARY
Hospital Medicine Discharge Summary    Patient ID: Rafaela Avila      Patient's PCP: Stacey Garrett MD    Admit Date: 9/17/2023     Discharge Date: 9/19/2023      Admitting Physician: Naomi Ulrich MD     Discharge Physician: Gerson Anderson MD     Discharge Diagnoses: Active Hospital Problems    Diagnosis     Fall [W19. XXXA]     Debility [R53.81]     Chronic respiratory failure with hypercapnia (HCC) [J96.12]     Chronic diastolic (congestive) heart failure (HCC) [I50.32]     COPD exacerbation ([address]) [J44.1]        The patient was seen and examined on day of discharge and this discharge summary is in conjunction with any daily progress note from day of discharge. Hospital Course:   81-year-old female with hypertension, diabetes mellitus type 2, chronic A-fib, on anticoagulation, congestive congestive heart failure, CKD stage III, DENISE, COPD, chronic respiratory presented to ED after having a fall at home. Patient was recently admitted and discharged from the hospital.  Patient reported that her  could not help her get up and had called EMS. Patient was able to ambulate with a walker prior to hospitalization. Patient was noted to have wheezing on examination on arrival.  Continue treatment for COPD exacerbation, PT/OT evaluation. Case management consult for SNF placement. COPD with chronic respiratory failure: Hypoxia to hypercapnia: Oxygen dependent 4 L-> currently saturating at 100%: Continue oxygen therapy. Prednisone 20 mg daily. Continue DuoNebs. Continue home inhaler regimen. Chronic respiratory failure: ABG pH 7.3/65.1/<30/35. Continue oxygen therapy, inhalers, patient should be up in chair at least 2-3 times a day. DENISE with morbid obesity: NIPPV nightly, daytime during naps and or as needed  Debility: Caused 2 falls once arriving at home today. \"Legs just gave out. \"  This is secondary to advanced age with significant advanced comorbid conditions, as well as plan.      Signed:    Ronak Espinoza MD   9/19/2023      Thank you Kehinde Weiss MD for the opportunity to be involved in this patient's care. If you have any questions or concerns please feel free to contact me at [phone].

## 2023-09-19 NOTE — PROGRESS NOTES
Infusions:    sodium chloride      dextrose       PRN Meds: albuterol, 0.63 mg, 4x Daily PRN  metOLazone, 2.5 mg, Daily PRN  simethicone, 80 mg, Q6H PRN  sodium chloride flush, 5-40 mL, PRN  sodium chloride, , PRN  ondansetron, 4 mg, Q8H PRN  polyethylene glycol, 17 g, Daily PRN  acetaminophen, 650 mg, Q6H PRN   Or  acetaminophen, 650 mg, Q6H PRN  glucose, 4 tablet, PRN  dextrose bolus, 125 mL, PRN   Or  dextrose bolus, 250 mL, PRN  glucagon (rDNA), 1 mg, PRN  dextrose, , Continuous PRN        Labs and Imaging   XR CHEST PORTABLE    Result Date: 9/17/2023  EXAMINATION: ONE XRAY VIEW OF THE CHEST 9/17/2023 10:11 pm COMPARISON: 09/01/2023 HISTORY: ORDERING SYSTEM PROVIDED HISTORY: SOB TECHNOLOGIST PROVIDED HISTORY: Reason for exam:->SOB Reason for Exam: fall, sob FINDINGS: The lungs are without acute focal process. There is no effusion or pneumothorax. The cardiomediastinal silhouette is stable. The osseous structures are stable. Stable cardiomegaly. CBC:   Recent Labs     09/17/23 0518 09/17/23 2217 09/18/23  0443   WBC 14.5* 15.9* 16.9*   HGB 8.5* 9.8* 8.6*    153 147       BMP:    Recent Labs     09/17/23  0518 09/17/23 2217 09/18/23  0443    139 137   K 3.4* 3.7 3.5   CL 96* 97* 95*   CO2 33* 32 30   BUN 68* 61* 61*   CREATININE 2.2* 2.2* 1.9*   GLUCOSE 126* 147* 162*       Hepatic: No results for input(s): \"AST\", \"ALT\", \"ALB\", \"BILITOT\", \"ALKPHOS\" in the last 72 hours. Lipids:   Lab Results   Component Value Date/Time    CHOL 120 09/02/2023 05:48 AM    HDL 57 09/02/2023 05:48 AM    HDL 38 09/09/2011 03:35 PM    TRIG 98 09/02/2023 05:48 AM     Hemoglobin A1C:   Lab Results   Component Value Date/Time    LABA1C 5.8 09/08/2023 05:09 AM     TSH:   Lab Results   Component Value Date/Time    TSH 1.71 09/02/2023 01:16 AM     Troponin: No results found for: \"TROPONINT\"  Lactic Acid: No results for input(s): \"LACTA\" in the last 72 hours.   BNP:   Recent Labs     09/17/23  0518 09/17/23  2213 PROBNP 5,713* 7,510*       UA:  Lab Results   Component Value Date/Time    NITRU Negative 09/05/2023 07:04 PM    COLORU Yellow 09/05/2023 07:04 PM    PHUR 7.0 09/05/2023 07:04 PM    WBCUA 23 09/05/2023 07:04 PM    RBCUA 1217 09/05/2023 07:04 PM    BACTERIA None Seen 09/05/2023 07:04 PM    CLARITYU CLOUDY 09/05/2023 07:04 PM    SPECGRAV 1.011 09/05/2023 07:04 PM    LEUKOCYTESUR MODERATE 09/05/2023 07:04 PM    UROBILINOGEN 1.0 09/05/2023 07:04 PM    BILIRUBINUR Negative 09/05/2023 07:04 PM    BILIRUBINUR neg 07/19/2014 08:36 AM    BLOODU LARGE 09/05/2023 07:04 PM    GLUCOSEU 100 09/05/2023 07:04 PM    KETUA Negative 09/05/2023 07:04 PM     Urine Cultures:   Lab Results   Component Value Date/Time    LABURIN No growth at 18 to 36 hours 09/05/2023 05:55 PM     Blood Cultures:   Lab Results   Component Value Date/Time    BC No Growth after 4 days of incubation. 09/01/2023 04:45 PM     Lab Results   Component Value Date/Time    BLOODCULT2 No Growth after 4 days of incubation.  09/01/2023 05:30 PM     Organism:   Lab Results   Component Value Date/Time    ORG Staph aureus MRSA 09/06/2023 04:53 PM         Electronically signed by Anthony Cage MD on 9/19/2023 at 11:41 AM

## 2023-09-19 NOTE — CARE COORDINATION
Case Management Discharge Note          Date / Time of Note: 9/19/2023 8:35 AM                  Patient Name: Felisa Guzman   YOB: 1945  Diagnosis: COPD exacerbation ([facility]) [J44.1]   Date / Time: 9/17/2023  9:21 PM    Financial:  Payor: MEDICARE / Plan: MEDICARE PART A AND B / Product Type: *No Product type* /      Pharmacy:    [facility] [id number] - [address], [address] [facility] [phone] - F [phone]  [address] [facility] [address]  Phone: [phone] Fax: [phone]      Assistance purchasing medications?: Potential Assistance Purchasing Medications: No  Assistance provided by Case Management: None at this time    DISCHARGE Disposition: [facility] (SNF)    Nursing Facility:   Discharging to Facility/ Agency   Name: [facility]  Address:  [address] [place] [address], [address]  Phone:  [phone]  Fax:  [phone]     LOC at discharge: [address] Completed: Yes              Notification completed in HENS/PAS?:  Yes : CM has completed HENS online through secure website for SNF admission at [facility]. Document ID #: [id number]    Transportation:  Transportation PLAN for discharge: EMS transportation   Mode of Transport: Ambulance stretcher - BLS  Reason for medical transport: Bed confined: Meets the following criteria 1) unable to get out of bed without assistance or ambulate, 2) unable to safely sit up in a wheelchair, 3) unable to maintain erect seating position in a chair for time needed for transport  Name of [address] [place] [address] Road: [facility]  Phone: [phone]  Time of Transport: 12:15 pm    Transport form completed: Yes    IMM Completed:   Not Indicated    Additional CM Notes:   Discharge order noted. Transportation arranged with [facility] at 12:15 pm.  Patient notified at bedside. Facility and bedside nurse made aware.       The Plan for Transition of Care is related to the

## 2023-09-19 NOTE — PLAN OF CARE
Problem: Discharge Planning  Goal: Discharge to home or other facility with appropriate resources  Outcome: Completed     Problem: Safety - Adult  Goal: Free from fall injury  Outcome: Completed     Problem: Pain  Goal: Verbalizes/displays adequate comfort level or baseline comfort level  Outcome: Completed     Problem: Respiratory - Adult  Goal: Achieves optimal ventilation and oxygenation  Outcome: Completed     Problem: Cardiovascular - Adult  Goal: Maintains optimal cardiac output and hemodynamic stability  Outcome: Completed     Problem: Skin/Tissue Integrity - Adult  Goal: Skin integrity remains intact  Outcome: Completed     Problem: Musculoskeletal - Adult  Goal: Return mobility to safest level of function  Outcome: Completed     Problem: Metabolic/Fluid and Electrolytes - Adult  Goal: Electrolytes maintained within normal limits  Outcome: Completed  Goal: Hemodynamic stability and optimal renal function maintained  Outcome: Completed  Goal: Glucose maintained within prescribed range  Outcome: Completed     Problem: Chronic Conditions and Co-morbidities  Goal: Patient's chronic conditions and co-morbidity symptoms are monitored and maintained or improved  Outcome: Completed     Problem: Skin/Tissue Integrity  Goal: Absence of new skin breakdown  Description: 1. Monitor for areas of redness and/or skin breakdown  2. Assess vascular access sites hourly  3. Every 4-6 hours minimum:  Change oxygen saturation probe site  4. Every 4-6 hours:  If on nasal continuous positive airway pressure, respiratory therapy assess nares and determine need for appliance change or resting period.   Outcome: Completed

## 2023-09-20 NOTE — TELEPHONE ENCOUNTER
Contacted patient/ and was able to leave a detailed vm with instructions and instructed to contact office to confirm and any q/c.

## 2023-09-26 NOTE — PROGRESS NOTES
Physician Progress Note      PATIENT:               Yaneth Botello  CSN #:                  [id number]  :                       1945  ADMIT DATE:       2023 3:59 PM  [facility] DATE:        2023 3:22 PM  RESPONDING  PROVIDER #:        Gaetano Sullivan MD          QUERY TEXT:    Patient admitted with CHF. Documentation reflects suspicious for NSTEMI in   Cardiology consult on 23. If possible, please document in the progress   notes and discharge summary if NSTEMI  was: The medical record reflects the following:  Risk Factors: Age, Hx- AF on Xarelto, CAD, heart failure, atrial fib, HTN,   aortic aneurysm and COPD. Clinical Indicators: Trop 100, 99, 96, 104, 107, 99. Cristy Salk Cristy Salk Cristy Salk Per H&P on 9/3/23-   Suspected for non-ST elevation MI. Cristy Salk Cristy Salk Cristy Salk Per Cardiology consult on 23-   Troponin is elevated suspicious for NSTEMI  Treatment: Ser Trop, Cardiology consult    Thank Christa Foster RN BSN CDS CRCR  [email or web address]  Options provided:  -- NSTEMI confirmed after study  -- NSTEMI treated and resolved  -- NSTEMI ruled out after study  -- Other - I will add my own diagnosis  -- Disagree - Not applicable / Not valid  -- Disagree - Clinically unable to determine / Unknown  -- Refer to Clinical Documentation Reviewer    PROVIDER RESPONSE TEXT:    NSTEMI confirmed after study.     Query created by: Jeff Walker on 2023 6:45 AM      Electronically signed by:  Gaetano Sullivan MD 2023 2:12 PM

## 2023-09-27 ENCOUNTER — TELEPHONE (OUTPATIENT)
Dept: FAMILY MEDICINE CLINIC | Age: 78
End: 2023-09-27
Payer: MEDICARE

## 2023-09-27 DIAGNOSIS — I13.0 HYPERTENSIVE HEART AND RENAL DISEASE WITH CONGESTIVE HEART FAILURE (HCC): Primary | ICD-10-CM

## 2023-09-27 PROCEDURE — G0179 MD RECERTIFICATION HHA PT: HCPCS | Performed by: FAMILY MEDICINE
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
## 2023-11-02 ENCOUNTER — APPOINTMENT (OUTPATIENT)
Dept: GENERAL RADIOLOGY | Age: 78
DRG: 872 | End: 2023-11-02
Payer: MEDICARE

## 2023-11-02 ENCOUNTER — HOSPITAL ENCOUNTER (INPATIENT)
Age: 78
LOS: 5 days | Discharge: ANOTHER HOSPITAL | DRG: 872 | End: 2023-11-07
Attending: EMERGENCY MEDICINE | Admitting: INTERNAL MEDICINE
Payer: MEDICARE

## 2023-11-02 ENCOUNTER — APPOINTMENT (OUTPATIENT)
Dept: CT IMAGING | Age: 78
DRG: 872 | End: 2023-11-02
Payer: MEDICARE

## 2023-11-02 DIAGNOSIS — R33.9 URINARY RETENTION: ICD-10-CM

## 2023-11-02 DIAGNOSIS — R06.00 DYSPNEA, UNSPECIFIED TYPE: ICD-10-CM

## 2023-11-02 DIAGNOSIS — R10.9 ABDOMINAL PAIN, UNSPECIFIED ABDOMINAL LOCATION: ICD-10-CM

## 2023-11-02 DIAGNOSIS — D72.829 LEUKOCYTOSIS, UNSPECIFIED TYPE: ICD-10-CM

## 2023-11-02 DIAGNOSIS — K52.9 COLITIS: Primary | ICD-10-CM

## 2023-11-02 DIAGNOSIS — N17.9 AKI (ACUTE KIDNEY INJURY) (HCC): ICD-10-CM

## 2023-11-02 DIAGNOSIS — N39.0 URINARY TRACT INFECTION WITHOUT HEMATURIA, SITE UNSPECIFIED: ICD-10-CM

## 2023-11-02 LAB
ALBUMIN SERPL-MCNC: 2.7 G/DL (ref 3.4–5)
ALBUMIN/GLOB SERPL: 0.9 {RATIO} (ref 1.1–2.2)
ALP SERPL-CCNC: 156 U/L (ref 40–129)
ALT SERPL-CCNC: 7 U/L (ref 10–40)
ANION GAP SERPL CALCULATED.3IONS-SCNC: 14 MMOL/L (ref 3–16)
AST SERPL-CCNC: 13 U/L (ref 15–37)
BACTERIA URNS QL MICRO: ABNORMAL /HPF
BASE EXCESS BLDV CALC-SCNC: -5.6 MMOL/L
BASOPHILS # BLD: 0 K/UL (ref 0–0.2)
BASOPHILS NFR BLD: 0 %
BILIRUB SERPL-MCNC: 0.4 MG/DL (ref 0–1)
BILIRUB UR QL STRIP.AUTO: NEGATIVE
BUN SERPL-MCNC: 56 MG/DL (ref 7–20)
CALCIUM SERPL-MCNC: 8.7 MG/DL (ref 8.3–10.6)
CHARACTER UR: ABNORMAL
CHLORIDE SERPL-SCNC: 96 MMOL/L (ref 99–110)
CLARITY UR: ABNORMAL
CO2 BLDV-SCNC: 21 MMOL/L
CO2 SERPL-SCNC: 21 MMOL/L (ref 21–32)
COHGB MFR BLDV: 1.7 %
COLOR UR: YELLOW
CREAT SERPL-MCNC: 2.9 MG/DL (ref 0.6–1.2)
DEPRECATED RDW RBC AUTO: 19.2 % (ref 12.4–15.4)
EKG ATRIAL RATE: 79 BPM
EKG DIAGNOSIS: NORMAL
EKG P AXIS: 79 DEGREES
EKG P-R INTERVAL: 200 MS
EKG Q-T INTERVAL: 388 MS
EKG QRS DURATION: 92 MS
EKG QTC CALCULATION (BAZETT): 444 MS
EKG R AXIS: -30 DEGREES
EKG T AXIS: 59 DEGREES
EKG VENTRICULAR RATE: 79 BPM
EOSINOPHIL # BLD: 0.3 K/UL (ref 0–0.6)
EOSINOPHIL NFR BLD: 1 %
EPI CELLS #/AREA URNS AUTO: 0 /HPF (ref 0–5)
GFR SERPLBLD CREATININE-BSD FMLA CKD-EPI: 16 ML/MIN/{1.73_M2}
GLUCOSE SERPL-MCNC: 105 MG/DL (ref 70–99)
GLUCOSE UR STRIP.AUTO-MCNC: NEGATIVE MG/DL
HCO3 BLDV-SCNC: 20 MMOL/L (ref 23–29)
HCT VFR BLD AUTO: 36.5 % (ref 36–48)
HGB BLD-MCNC: 11.6 G/DL (ref 12–16)
HGB UR QL STRIP.AUTO: NEGATIVE
HYALINE CASTS #/AREA URNS AUTO: 0 /LPF (ref 0–8)
KETONES UR STRIP.AUTO-MCNC: NEGATIVE MG/DL
LACTATE BLDV-SCNC: 0.9 MMOL/L (ref 0.4–2)
LEUKOCYTE ESTERASE UR QL STRIP.AUTO: ABNORMAL
LIPASE SERPL-CCNC: 7 U/L (ref 13–60)
LYMPHOCYTES # BLD: 1.8 K/UL (ref 1–5.1)
LYMPHOCYTES NFR BLD: 5 %
MCH RBC QN AUTO: 28.8 PG (ref 26–34)
MCHC RBC AUTO-ENTMCNC: 31.7 G/DL (ref 31–36)
MCV RBC AUTO: 90.9 FL (ref 80–100)
METHGB MFR BLDV: 0.5 %
MONOCYTES # BLD: 0.6 K/UL (ref 0–1.3)
MONOCYTES NFR BLD: 2 %
NEUTROPHILS # BLD: 27.1 K/UL (ref 1.7–7.7)
NEUTROPHILS NFR BLD: 84 %
NEUTS BAND NFR BLD MANUAL: 7 % (ref 0–7)
NITRITE UR QL STRIP.AUTO: POSITIVE
NT-PROBNP SERPL-MCNC: 5483 PG/ML (ref 0–449)
O2 THERAPY: ABNORMAL
PCO2 BLDV: 38.5 MMHG (ref 40–50)
PH BLDV: 7.32 [PH] (ref 7.35–7.45)
PH UR STRIP.AUTO: 6.5 [PH] (ref 5–8)
PLATELET # BLD AUTO: 335 K/UL (ref 135–450)
PMV BLD AUTO: 9.1 FL (ref 5–10.5)
PO2 BLDV: 39 MMHG
POTASSIUM SERPL-SCNC: 4.9 MMOL/L (ref 3.5–5.1)
PROT SERPL-MCNC: 5.7 G/DL (ref 6.4–8.2)
PROT UR STRIP.AUTO-MCNC: 100 MG/DL
RBC # BLD AUTO: 4.02 M/UL (ref 4–5.2)
RBC CLUMPS #/AREA URNS AUTO: 0 /HPF (ref 0–4)
RBC MORPH BLD: NORMAL
SAO2 % BLDV: 67 %
SODIUM SERPL-SCNC: 131 MMOL/L (ref 136–145)
SP GR UR STRIP.AUTO: 1.01 (ref 1–1.03)
TROPONIN, HIGH SENSITIVITY: 88 NG/L (ref 0–14)
TROPONIN, HIGH SENSITIVITY: 91 NG/L (ref 0–14)
UA COMPLETE W REFLEX CULTURE PNL UR: YES
UA DIPSTICK W REFLEX MICRO PNL UR: YES
URN SPEC COLLECT METH UR: ABNORMAL
UROBILINOGEN UR STRIP-ACNC: 0.2 E.U./DL
VARIANT LYMPHS NFR BLD MANUAL: 1 % (ref 0–6)
WBC # BLD AUTO: 29.8 K/UL (ref 4–11)
WBC #/AREA URNS AUTO: 150 /HPF (ref 0–5)

## 2023-11-02 PROCEDURE — 87040 BLOOD CULTURE FOR BACTERIA: CPT

## 2023-11-02 PROCEDURE — 93010 ELECTROCARDIOGRAM REPORT: CPT | Performed by: INTERNAL MEDICINE

## 2023-11-02 PROCEDURE — 87086 URINE CULTURE/COLONY COUNT: CPT

## 2023-11-02 PROCEDURE — 2580000003 HC RX 258: Performed by: INTERNAL MEDICINE

## 2023-11-02 PROCEDURE — 81001 URINALYSIS AUTO W/SCOPE: CPT

## 2023-11-02 PROCEDURE — 2580000003 HC RX 258: Performed by: EMERGENCY MEDICINE

## 2023-11-02 PROCEDURE — 85025 COMPLETE CBC W/AUTO DIFF WBC: CPT

## 2023-11-02 PROCEDURE — 83690 ASSAY OF LIPASE: CPT

## 2023-11-02 PROCEDURE — 6370000000 HC RX 637 (ALT 250 FOR IP): Performed by: INTERNAL MEDICINE

## 2023-11-02 PROCEDURE — 6370000000 HC RX 637 (ALT 250 FOR IP): Performed by: EMERGENCY MEDICINE

## 2023-11-02 PROCEDURE — 36415 COLL VENOUS BLD VENIPUNCTURE: CPT

## 2023-11-02 PROCEDURE — 94760 N-INVAS EAR/PLS OXIMETRY 1: CPT

## 2023-11-02 PROCEDURE — 83605 ASSAY OF LACTIC ACID: CPT

## 2023-11-02 PROCEDURE — 74176 CT ABD & PELVIS W/O CONTRAST: CPT

## 2023-11-02 PROCEDURE — 6360000002 HC RX W HCPCS: Performed by: EMERGENCY MEDICINE

## 2023-11-02 PROCEDURE — 99285 EMERGENCY DEPT VISIT HI MDM: CPT

## 2023-11-02 PROCEDURE — 82803 BLOOD GASES ANY COMBINATION: CPT

## 2023-11-02 PROCEDURE — 93005 ELECTROCARDIOGRAM TRACING: CPT | Performed by: EMERGENCY MEDICINE

## 2023-11-02 PROCEDURE — 83880 ASSAY OF NATRIURETIC PEPTIDE: CPT

## 2023-11-02 PROCEDURE — 80053 COMPREHEN METABOLIC PANEL: CPT

## 2023-11-02 PROCEDURE — 71045 X-RAY EXAM CHEST 1 VIEW: CPT

## 2023-11-02 PROCEDURE — 1200000000 HC SEMI PRIVATE

## 2023-11-02 PROCEDURE — 6360000002 HC RX W HCPCS: Performed by: INTERNAL MEDICINE

## 2023-11-02 PROCEDURE — 84484 ASSAY OF TROPONIN QUANT: CPT

## 2023-11-02 PROCEDURE — 94640 AIRWAY INHALATION TREATMENT: CPT

## 2023-11-02 PROCEDURE — 51702 INSERT TEMP BLADDER CATH: CPT

## 2023-11-02 RX ORDER — ONDANSETRON 2 MG/ML
4 INJECTION INTRAMUSCULAR; INTRAVENOUS EVERY 6 HOURS PRN
Status: DISCONTINUED | OUTPATIENT
Start: 2023-11-02 | End: 2023-11-08 | Stop reason: HOSPADM

## 2023-11-02 RX ORDER — IPRATROPIUM BROMIDE AND ALBUTEROL SULFATE 2.5; .5 MG/3ML; MG/3ML
1 SOLUTION RESPIRATORY (INHALATION) ONCE
Status: COMPLETED | OUTPATIENT
Start: 2023-11-02 | End: 2023-11-02

## 2023-11-02 RX ORDER — HYDRALAZINE HYDROCHLORIDE 50 MG/1
50 TABLET, FILM COATED ORAL EVERY 8 HOURS SCHEDULED
Status: DISCONTINUED | OUTPATIENT
Start: 2023-11-02 | End: 2023-11-08 | Stop reason: HOSPADM

## 2023-11-02 RX ORDER — ACETAMINOPHEN 650 MG/1
650 SUPPOSITORY RECTAL EVERY 6 HOURS PRN
Status: DISCONTINUED | OUTPATIENT
Start: 2023-11-02 | End: 2023-11-08 | Stop reason: HOSPADM

## 2023-11-02 RX ORDER — SODIUM CHLORIDE 0.9 % (FLUSH) 0.9 %
5-40 SYRINGE (ML) INJECTION PRN
Status: DISCONTINUED | OUTPATIENT
Start: 2023-11-02 | End: 2023-11-08 | Stop reason: HOSPADM

## 2023-11-02 RX ORDER — ONDANSETRON 4 MG/1
4 TABLET, ORALLY DISINTEGRATING ORAL EVERY 8 HOURS PRN
Status: DISCONTINUED | OUTPATIENT
Start: 2023-11-02 | End: 2023-11-08 | Stop reason: HOSPADM

## 2023-11-02 RX ORDER — POLYETHYLENE GLYCOL 3350 17 G/17G
17 POWDER, FOR SOLUTION ORAL DAILY PRN
Status: DISCONTINUED | OUTPATIENT
Start: 2023-11-02 | End: 2023-11-08 | Stop reason: HOSPADM

## 2023-11-02 RX ORDER — PANTOPRAZOLE SODIUM 40 MG/1
40 TABLET, DELAYED RELEASE ORAL
Status: DISCONTINUED | OUTPATIENT
Start: 2023-11-03 | End: 2023-11-08 | Stop reason: HOSPADM

## 2023-11-02 RX ORDER — CARVEDILOL 6.25 MG/1
6.25 TABLET ORAL 2 TIMES DAILY WITH MEALS
Status: DISCONTINUED | OUTPATIENT
Start: 2023-11-02 | End: 2023-11-08 | Stop reason: HOSPADM

## 2023-11-02 RX ORDER — SODIUM CHLORIDE 9 MG/ML
INJECTION, SOLUTION INTRAVENOUS CONTINUOUS
Status: DISCONTINUED | OUTPATIENT
Start: 2023-11-02 | End: 2023-11-03

## 2023-11-02 RX ORDER — ISOSORBIDE MONONITRATE 60 MG/1
120 TABLET, EXTENDED RELEASE ORAL DAILY
Status: DISCONTINUED | OUTPATIENT
Start: 2023-11-03 | End: 2023-11-08 | Stop reason: HOSPADM

## 2023-11-02 RX ORDER — RANOLAZINE 500 MG/1
500 TABLET, EXTENDED RELEASE ORAL 2 TIMES DAILY
Status: DISCONTINUED | OUTPATIENT
Start: 2023-11-02 | End: 2023-11-08 | Stop reason: HOSPADM

## 2023-11-02 RX ORDER — ACETAMINOPHEN 325 MG/1
650 TABLET ORAL EVERY 6 HOURS PRN
Status: DISCONTINUED | OUTPATIENT
Start: 2023-11-02 | End: 2023-11-08 | Stop reason: HOSPADM

## 2023-11-02 RX ORDER — METRONIDAZOLE 500 MG/100ML
500 INJECTION, SOLUTION INTRAVENOUS EVERY 8 HOURS
Status: DISCONTINUED | OUTPATIENT
Start: 2023-11-03 | End: 2023-11-08 | Stop reason: HOSPADM

## 2023-11-02 RX ORDER — METRONIDAZOLE 500 MG/100ML
500 INJECTION, SOLUTION INTRAVENOUS ONCE
Status: COMPLETED | OUTPATIENT
Start: 2023-11-02 | End: 2023-11-02

## 2023-11-02 RX ORDER — SODIUM CHLORIDE 0.9 % (FLUSH) 0.9 %
5-40 SYRINGE (ML) INJECTION EVERY 12 HOURS SCHEDULED
Status: DISCONTINUED | OUTPATIENT
Start: 2023-11-02 | End: 2023-11-08 | Stop reason: HOSPADM

## 2023-11-02 RX ORDER — CLONIDINE HYDROCHLORIDE 0.1 MG/1
0.1 TABLET ORAL 2 TIMES DAILY
Status: DISCONTINUED | OUTPATIENT
Start: 2023-11-02 | End: 2023-11-08 | Stop reason: HOSPADM

## 2023-11-02 RX ORDER — ALBUTEROL SULFATE 2.5 MG/3ML
0.63 SOLUTION RESPIRATORY (INHALATION) EVERY 4 HOURS PRN
Status: DISCONTINUED | OUTPATIENT
Start: 2023-11-02 | End: 2023-11-08 | Stop reason: HOSPADM

## 2023-11-02 RX ORDER — DILTIAZEM HYDROCHLORIDE 120 MG/1
120 CAPSULE, COATED, EXTENDED RELEASE ORAL DAILY
Status: DISCONTINUED | OUTPATIENT
Start: 2023-11-03 | End: 2023-11-08 | Stop reason: HOSPADM

## 2023-11-02 RX ORDER — ROSUVASTATIN CALCIUM 20 MG/1
20 TABLET, COATED ORAL DAILY
Status: DISCONTINUED | OUTPATIENT
Start: 2023-11-03 | End: 2023-11-08 | Stop reason: HOSPADM

## 2023-11-02 RX ORDER — SODIUM CHLORIDE 9 MG/ML
INJECTION, SOLUTION INTRAVENOUS PRN
Status: DISCONTINUED | OUTPATIENT
Start: 2023-11-02 | End: 2023-11-08 | Stop reason: HOSPADM

## 2023-11-02 RX ADMIN — CEFTRIAXONE 1000 MG: 1 INJECTION, POWDER, FOR SOLUTION INTRAMUSCULAR; INTRAVENOUS at 21:22

## 2023-11-02 RX ADMIN — CLONIDINE HYDROCHLORIDE 0.1 MG: 0.1 TABLET ORAL at 21:14

## 2023-11-02 RX ADMIN — SODIUM CHLORIDE: 9 INJECTION, SOLUTION INTRAVENOUS at 21:18

## 2023-11-02 RX ADMIN — MOMETASONE FUROATE AND FORMOTEROL FUMARATE DIHYDRATE 2 PUFF: 200; 5 AEROSOL RESPIRATORY (INHALATION) at 19:33

## 2023-11-02 RX ADMIN — CARVEDILOL 6.25 MG: 6.25 TABLET, FILM COATED ORAL at 21:14

## 2023-11-02 RX ADMIN — IPRATROPIUM BROMIDE AND ALBUTEROL SULFATE 1 DOSE: .5; 3 SOLUTION RESPIRATORY (INHALATION) at 13:29

## 2023-11-02 RX ADMIN — METRONIDAZOLE 500 MG: 500 INJECTION, SOLUTION INTRAVENOUS at 17:58

## 2023-11-02 RX ADMIN — RANOLAZINE 500 MG: 500 TABLET, FILM COATED, EXTENDED RELEASE ORAL at 21:14

## 2023-11-02 RX ADMIN — CEFTRIAXONE 1000 MG: 1 INJECTION, POWDER, FOR SOLUTION INTRAMUSCULAR; INTRAVENOUS at 16:34

## 2023-11-02 RX ADMIN — HYDRALAZINE HYDROCHLORIDE 50 MG: 50 TABLET, FILM COATED ORAL at 21:14

## 2023-11-02 ASSESSMENT — PAIN SCALES - GENERAL: PAINLEVEL_OUTOF10: 6

## 2023-11-02 ASSESSMENT — PATIENT HEALTH QUESTIONNAIRE - PHQ9
1. LITTLE INTEREST OR PLEASURE IN DOING THINGS: 0
SUM OF ALL RESPONSES TO PHQ QUESTIONS 1-9: 0
SUM OF ALL RESPONSES TO PHQ QUESTIONS 1-9: 0
SUM OF ALL RESPONSES TO PHQ9 QUESTIONS 1 & 2: 0
SUM OF ALL RESPONSES TO PHQ QUESTIONS 1-9: 0
2. FEELING DOWN, DEPRESSED OR HOPELESS: 0
SUM OF ALL RESPONSES TO PHQ QUESTIONS 1-9: 0

## 2023-11-02 ASSESSMENT — PAIN DESCRIPTION - LOCATION: LOCATION: ABDOMEN

## 2023-11-02 NOTE — ED PROVIDER NOTES
[address] [place] [address] EMERGENCY DEPARTMENT    CHIEF COMPLAINT  Abdominal Pain (Right upper quad abd pain for an unknown amount of time. Facility sent patient in for low wbc count ) and Shortness of Breath (Shortness of breath on going. Denies chest pain at this time. )       HISTORY OF PRESENT ILLNESS  Julius Hernadez is a 69 y.o. female with history of aortic aneurysm status postrepair, atrial fibrillation on anticoagulation, CAD, CHF, COPD who presents to the ED from her nursing home with concerns of low white blood cell count. Patient states that she is here \"blood work\". Upon further questioning, she states that she is having abdominal pain and that she has been feeling somewhat short of breath lately. She does not provide any additional history. She does not believe she has been febrile. Denies nausea, vomiting, diarrhea, constipation.     I have reviewed the following from the nursing documentation:    Past Medical History:   Diagnosis Date    AAA (abdominal aortic aneurysm) ([address])     pt states it is 4cm    AAA (abdominal aortic aneurysm) without rupture (HCC) 2/10/2015    Atrial fibrillation (HCC)     CAD (coronary artery disease)     CHF (congestive heart failure) (HCC)     COPD (chronic obstructive pulmonary disease) (HCC)     History of blood clots     Hyperlipidemia     Hypertension      Past Surgical History:   Procedure Laterality Date    ABDOMINAL AORTIC ANEURYSM REPAIR      Endovascular abdominal AA    APPENDECTOMY  1990    incidental    BLADDER SUSPENSION      CAPSULE ENDOSCOPY N/A 5/22/2023    ESOPHAGEAL CAPSULE ENDOSCOPY performed by Britton Figueroa MD at [facility] Right 11/28/2022    Cardiomems PA sensor device implant Left PA    [facility]  10/15/2013    COLECTOMY N/A 5/26/2023    LAPAROSCOPIC ASSISTED LYSIS OF ADHESIONS AND PUSH ENTEROSCOPY performed by Bernie Barrera MD at [facility]

## 2023-11-02 NOTE — ED NOTES
Patient remains alert and oreinted. Pain score and patient condition reviewed. No acute distress noted. Report given to RN recieving patient.           Perfecto Dandy, RN  11/02/23 0087

## 2023-11-02 NOTE — H&P
11.6*   HCT 36.5        Recent Labs     11/02/23  1313   *   K 4.9   CL 96*   CO2 21   BUN 56*   CREATININE 2.9*   CALCIUM 8.7     Recent Labs     11/02/23  1313   AST 13*   ALT 7*   BILITOT 0.4   ALKPHOS 156*     No results for input(s): \"INR\" in the last 72 hours. Recent Labs     11/02/23  1313 11/02/23  1414   TROPHS 91* 88*       Urinalysis:      Lab Results   Component Value Date/Time    NITRU POSITIVE 11/02/2023 01:13 PM    WBCUA 150 11/02/2023 01:13 PM    BACTERIA 2+ 11/02/2023 01:13 PM    RBCUA 0 11/02/2023 01:13 PM    BLOODU Negative 11/02/2023 01:13 PM    SPECGRAV 1.013 11/02/2023 01:13 PM    GLUCOSEU Negative 11/02/2023 01:13 PM       Radiology:     CXR: I have reviewed the CXR with the following interpretation: No infiltrate  EKG:  I have reviewed the EKG with the following interpretation: No ST elevation    CT ABDOMEN PELVIS WO CONTRAST Additional Contrast? None   Final Result   1. Long segment of mucosal thickening of the proximal colon. Colitis may be   suspected clinically. 2. Air-fluid levels in prominent loops of distal small bowel that may   represent ileus or enteritis in association with above.    3. Coronary artery disease and mild interstitial changes in the visualized   chest.         XR CHEST PORTABLE   Final Result   No acute cardiopulmonary findings             Consults:    IP CONSULT TO GENERAL SURGERY  IP CONSULT TO GI  IP CONSULT TO CARDIOLOGY    ASSESSMENT:    Active Hospital Problems    Diagnosis Date Noted    Colitis [K52.9] 11/02/2023         PLAN:    Colitis was normal lactic acid, admitted to the hospital, start on ceftriaxone and Flagyl for now, GI consulted repeat CBC CMP in a.m., will start IV fluid  Possible ileus likely due to above n.p.o. for now General surgery consulted  Coronary artery disease with increased troponin no chest pain at this time patient almost maxed on cardiac medications, will consult cardiology for further recommendations  Generalized

## 2023-11-02 NOTE — ED NOTES
Pt arrived to dept via ems. Pt c/o abd pain in upper right quadrant, patient does not know when pain began. Nursing facility sent patient in for low wbc count. Pt complains of chronic SOB at this time. Denies chest pain. Placed on 2l o2 for comfort measures per request at this time. Pt awake, alert and oriented x 3. Skin warm and dry/normal color for ethnicity. Resp regular but labored. Pt placed in gown and on cardiac monitor. Call light in reach.       Kory Bush RN  11/02/23 0885

## 2023-11-03 PROBLEM — N18.9 ACUTE KIDNEY INJURY SUPERIMPOSED ON CKD (HCC): Status: ACTIVE | Noted: 2023-04-26

## 2023-11-03 PROBLEM — D72.829 LEUKOCYTOSIS: Status: ACTIVE | Noted: 2023-09-07

## 2023-11-03 LAB
ALBUMIN SERPL-MCNC: 2.4 G/DL (ref 3.4–5)
ALBUMIN/GLOB SERPL: 0.8 {RATIO} (ref 1.1–2.2)
ALP SERPL-CCNC: 130 U/L (ref 40–129)
ALT SERPL-CCNC: 7 U/L (ref 10–40)
ANION GAP SERPL CALCULATED.3IONS-SCNC: 16 MMOL/L (ref 3–16)
ANISOCYTOSIS BLD QL SMEAR: ABNORMAL
AST SERPL-CCNC: 13 U/L (ref 15–37)
BACTERIA UR CULT: ABNORMAL
BASOPHILS # BLD: 0 K/UL (ref 0–0.2)
BASOPHILS NFR BLD: 0 %
BILIRUB SERPL-MCNC: 0.4 MG/DL (ref 0–1)
BUN SERPL-MCNC: 62 MG/DL (ref 7–20)
CALCIUM SERPL-MCNC: 8.6 MG/DL (ref 8.3–10.6)
CHLORIDE SERPL-SCNC: 98 MMOL/L (ref 99–110)
CO2 SERPL-SCNC: 17 MMOL/L (ref 21–32)
CREAT SERPL-MCNC: 2.9 MG/DL (ref 0.6–1.2)
DEPRECATED RDW RBC AUTO: 19.3 % (ref 12.4–15.4)
EOSINOPHIL # BLD: 0 K/UL (ref 0–0.6)
EOSINOPHIL NFR BLD: 0 %
GFR SERPLBLD CREATININE-BSD FMLA CKD-EPI: 16 ML/MIN/{1.73_M2}
GLUCOSE SERPL-MCNC: 86 MG/DL (ref 70–99)
HCT VFR BLD AUTO: 33.3 % (ref 36–48)
HGB BLD-MCNC: 10.5 G/DL (ref 12–16)
LYMPHOCYTES # BLD: 0.3 K/UL (ref 1–5.1)
LYMPHOCYTES NFR BLD: 1 %
MCH RBC QN AUTO: 28.6 PG (ref 26–34)
MCHC RBC AUTO-ENTMCNC: 31.5 G/DL (ref 31–36)
MCV RBC AUTO: 90.9 FL (ref 80–100)
MONOCYTES # BLD: 0.3 K/UL (ref 0–1.3)
MONOCYTES NFR BLD: 1 %
NEUTROPHILS # BLD: 30.4 K/UL (ref 1.7–7.7)
NEUTROPHILS NFR BLD: 88 %
NEUTS BAND NFR BLD MANUAL: 10 % (ref 0–7)
ORGANISM: ABNORMAL
PLATELET # BLD AUTO: 327 K/UL (ref 135–450)
PLATELET BLD QL SMEAR: ADEQUATE
PMV BLD AUTO: 9 FL (ref 5–10.5)
POIKILOCYTOSIS BLD QL SMEAR: ABNORMAL
POLYCHROMASIA BLD QL SMEAR: ABNORMAL
POTASSIUM SERPL-SCNC: 5.2 MMOL/L (ref 3.5–5.1)
PROT SERPL-MCNC: 5.4 G/DL (ref 6.4–8.2)
RBC # BLD AUTO: 3.66 M/UL (ref 4–5.2)
SODIUM SERPL-SCNC: 131 MMOL/L (ref 136–145)
WBC # BLD AUTO: 31 K/UL (ref 4–11)

## 2023-11-03 PROCEDURE — 2580000003 HC RX 258: Performed by: INTERNAL MEDICINE

## 2023-11-03 PROCEDURE — 80053 COMPREHEN METABOLIC PANEL: CPT

## 2023-11-03 PROCEDURE — 97530 THERAPEUTIC ACTIVITIES: CPT

## 2023-11-03 PROCEDURE — 99222 1ST HOSP IP/OBS MODERATE 55: CPT | Performed by: SURGERY

## 2023-11-03 PROCEDURE — 6370000000 HC RX 637 (ALT 250 FOR IP): Performed by: INTERNAL MEDICINE

## 2023-11-03 PROCEDURE — 97162 PT EVAL MOD COMPLEX 30 MIN: CPT

## 2023-11-03 PROCEDURE — 36415 COLL VENOUS BLD VENIPUNCTURE: CPT

## 2023-11-03 PROCEDURE — 94640 AIRWAY INHALATION TREATMENT: CPT

## 2023-11-03 PROCEDURE — 6360000002 HC RX W HCPCS: Performed by: INTERNAL MEDICINE

## 2023-11-03 PROCEDURE — APPNB15 APP NON BILLABLE TIME 0-15 MINS: Performed by: PHYSICIAN ASSISTANT

## 2023-11-03 PROCEDURE — 1200000000 HC SEMI PRIVATE

## 2023-11-03 PROCEDURE — APPSS15 APP SPLIT SHARED TIME 0-15 MINUTES: Performed by: PHYSICIAN ASSISTANT

## 2023-11-03 PROCEDURE — 94760 N-INVAS EAR/PLS OXIMETRY 1: CPT

## 2023-11-03 PROCEDURE — 97166 OT EVAL MOD COMPLEX 45 MIN: CPT

## 2023-11-03 PROCEDURE — 97535 SELF CARE MNGMENT TRAINING: CPT

## 2023-11-03 PROCEDURE — 85025 COMPLETE CBC W/AUTO DIFF WBC: CPT

## 2023-11-03 PROCEDURE — 99222 1ST HOSP IP/OBS MODERATE 55: CPT | Performed by: INTERNAL MEDICINE

## 2023-11-03 RX ORDER — SODIUM CHLORIDE 9 MG/ML
INJECTION, SOLUTION INTRAVENOUS CONTINUOUS
Status: DISCONTINUED | OUTPATIENT
Start: 2023-11-03 | End: 2023-11-08 | Stop reason: HOSPADM

## 2023-11-03 RX ADMIN — CARVEDILOL 6.25 MG: 6.25 TABLET, FILM COATED ORAL at 18:23

## 2023-11-03 RX ADMIN — CLONIDINE HYDROCHLORIDE 0.1 MG: 0.1 TABLET ORAL at 09:42

## 2023-11-03 RX ADMIN — DILTIAZEM HYDROCHLORIDE 120 MG: 120 CAPSULE, COATED, EXTENDED RELEASE ORAL at 09:42

## 2023-11-03 RX ADMIN — MOMETASONE FUROATE AND FORMOTEROL FUMARATE DIHYDRATE 2 PUFF: 200; 5 AEROSOL RESPIRATORY (INHALATION) at 07:40

## 2023-11-03 RX ADMIN — METRONIDAZOLE 500 MG: 500 INJECTION, SOLUTION INTRAVENOUS at 09:47

## 2023-11-03 RX ADMIN — HYDRALAZINE HYDROCHLORIDE 50 MG: 50 TABLET, FILM COATED ORAL at 14:45

## 2023-11-03 RX ADMIN — MOMETASONE FUROATE AND FORMOTEROL FUMARATE DIHYDRATE 2 PUFF: 200; 5 AEROSOL RESPIRATORY (INHALATION) at 19:25

## 2023-11-03 RX ADMIN — SODIUM CHLORIDE, PRESERVATIVE FREE 10 ML: 5 INJECTION INTRAVENOUS at 20:41

## 2023-11-03 RX ADMIN — CARVEDILOL 6.25 MG: 6.25 TABLET, FILM COATED ORAL at 09:42

## 2023-11-03 RX ADMIN — ONDANSETRON 4 MG: 2 INJECTION INTRAMUSCULAR; INTRAVENOUS at 20:47

## 2023-11-03 RX ADMIN — HYDRALAZINE HYDROCHLORIDE 50 MG: 50 TABLET, FILM COATED ORAL at 06:35

## 2023-11-03 RX ADMIN — CLONIDINE HYDROCHLORIDE 0.1 MG: 0.1 TABLET ORAL at 20:30

## 2023-11-03 RX ADMIN — RIVAROXABAN 15 MG: 15 TABLET, FILM COATED ORAL at 09:41

## 2023-11-03 RX ADMIN — PANTOPRAZOLE SODIUM 40 MG: 40 TABLET, DELAYED RELEASE ORAL at 06:34

## 2023-11-03 RX ADMIN — METRONIDAZOLE 500 MG: 500 INJECTION, SOLUTION INTRAVENOUS at 18:16

## 2023-11-03 RX ADMIN — RANOLAZINE 500 MG: 500 TABLET, FILM COATED, EXTENDED RELEASE ORAL at 09:42

## 2023-11-03 RX ADMIN — SODIUM CHLORIDE: 9 INJECTION, SOLUTION INTRAVENOUS at 14:48

## 2023-11-03 RX ADMIN — ISOSORBIDE MONONITRATE 120 MG: 60 TABLET, EXTENDED RELEASE ORAL at 09:42

## 2023-11-03 RX ADMIN — ROSUVASTATIN CALCIUM 20 MG: 20 TABLET, FILM COATED ORAL at 09:42

## 2023-11-03 RX ADMIN — CEFTRIAXONE SODIUM 2000 MG: 2 INJECTION, POWDER, FOR SOLUTION INTRAMUSCULAR; INTRAVENOUS at 20:40

## 2023-11-03 RX ADMIN — METRONIDAZOLE 500 MG: 500 INJECTION, SOLUTION INTRAVENOUS at 02:08

## 2023-11-03 RX ADMIN — TIOTROPIUM BROMIDE INHALATION SPRAY 2 PUFF: 3.12 SPRAY, METERED RESPIRATORY (INHALATION) at 07:40

## 2023-11-03 RX ADMIN — RANOLAZINE 500 MG: 500 TABLET, FILM COATED, EXTENDED RELEASE ORAL at 20:30

## 2023-11-03 RX ADMIN — ONDANSETRON 4 MG: 2 INJECTION INTRAMUSCULAR; INTRAVENOUS at 02:08

## 2023-11-03 RX ADMIN — SODIUM CHLORIDE: 9 INJECTION, SOLUTION INTRAVENOUS at 20:40

## 2023-11-03 RX ADMIN — HYDRALAZINE HYDROCHLORIDE 50 MG: 50 TABLET, FILM COATED ORAL at 20:30

## 2023-11-03 ASSESSMENT — PAIN SCALES - GENERAL
PAINLEVEL_OUTOF10: 0

## 2023-11-03 NOTE — PLAN OF CARE
Problem: Discharge Planning  Goal: Discharge to home or other facility with appropriate resources  11/3/2023 1331 by Franklyn Samano RN  Outcome: Progressing  Flowsheets (Taken 11/3/2023 1000)  Discharge to home or other facility with appropriate resources: Identify barriers to discharge with patient and caregiver  11/3/2023 0053 by Emiliano La RN  Outcome: Progressing     Problem: Pain  Goal: Verbalizes/displays adequate comfort level or baseline comfort level  11/3/2023 1331 by Franklyn Samano RN  Outcome: Progressing  11/3/2023 0053 by Emiliano La RN  Outcome: Progressing     Problem: ABCDS Injury Assessment  Goal: Absence of physical injury  11/3/2023 1331 by Franklyn Samano RN  Outcome: Progressing  11/3/2023 0053 by Emiliano La RN  Outcome: Progressing     Problem: Safety - Adult  Goal: Free from fall injury  11/3/2023 1331 by Franklyn Samano RN  Outcome: Progressing  11/3/2023 0053 by Emiliano La RN  Outcome: Progressing     Problem: Chronic Conditions and Co-morbidities  Goal: Patient's chronic conditions and co-morbidity symptoms are monitored and maintained or improved  Outcome: Progressing  Flowsheets (Taken 11/3/2023 1000)  Care Plan - Patient's Chronic Conditions and Co-Morbidity Symptoms are Monitored and Maintained or Improved: Monitor and assess patient's chronic conditions and comorbid symptoms for stability, deterioration, or improvement

## 2023-11-03 NOTE — DISCHARGE INSTR - COC
Continuity of Care Form    Patient Name: Carlos Luna   :  1945  MRN:  [id number]    Admit date:  2023  Discharge date:  ***    Code Status Order: Full Code   Advance Directives:     Admitting Physician:  Sha Redmond MD  PCP: Amanda Godwin MD    Discharging Nurse: [facility] Unit/Room#: S9M-2381/7920-20  Discharging Unit Phone Number: ***    Emergency Contact:   Extended Emergency Contact Information  Primary Emergency Contact: Linda Perez  Address: [address] [place] [address] [facility] of [address] [address] [place] Phone: [phone]  Mobile Phone: [phone]  Relation: Spouse   needed? No  Secondary Emergency Contact: [facility] of [address] [address] [place] Phone: [phone]  Mobile Phone: [phone]  Relation: Child    Past Surgical History:  Past Surgical History:   Procedure Laterality Date    ABDOMINAL AORTIC ANEURYSM REPAIR      Endovascular abdominal AA    APPENDECTOMY      incidental    BLADDER SUSPENSION      CAPSULE ENDOSCOPY N/A 2023    ESOPHAGEAL CAPSULE ENDOSCOPY performed by Andrade Nixon MD at [address] Right 2022    Cardiomems PA sensor device implant Left PA    [id number] [facility]  10/15/2013    COLECTOMY N/A 2023    LAPAROSCOPIC ASSISTED LYSIS OF ADHESIONS AND PUSH ENTEROSCOPY performed by Milad Suazo MD at [address] [place] [address]  2007    dr Lluvia Chavez and check in 5 years. COLONOSCOPY  2017    ok dr arndt, repeat 5 years    COLONOSCOPY N/A 5/10/2023    COLONOSCOPY performed by Precious Rich MD at [address] ([address] [place] [address])      for benign tumor.  just the uterus    JOINT REPLACEMENT  2013    right knee replacement    TONSILLECTOMY  as a child    TUMOR EXCISION      benign behind right ear about     UPPER GASTROINTESTINAL ENDOSCOPY N/A 2023    EGD CONTROL HEMORRHAGE

## 2023-11-03 NOTE — ACP (ADVANCE CARE PLANNING)
Advance Care Planning     Advance Care Planning Activator (Inpatient)  Conversation Note      Date of ACP Conversation: 11/3/2023     Conversation Conducted with: Patient with Decision Making Capacity    ACP Activator: Fabrizio Cantor RN    Health Care Decision Maker:     Current Designated Health Care Decision Maker:     Primary Decision Maker: Maykel Vickers Spouse - [phone]    Secondary Decision Maker: Tawana Hernandez - [phone]    Care Preferences    Ventilation: \"If you were in your present state of health and suddenly became very ill and were unable to breathe on your own, what would your preference be about the use of a ventilator (breathing machine) if it were available to you? \"      Would the patient desire the use of ventilator (breathing machine)?: no    \"If your health worsens and it becomes clear that your chance of recovery is unlikely, what would your preference be about the use of a ventilator (breathing machine) if it were available to you? \"     Would the patient desire the use of ventilator (breathing machine)?: No      Resuscitation  \"CPR works best to restart the heart when there is a sudden event, like a heart attack, in someone who is otherwise healthy. Unfortunately, CPR does not typically restart the heart for people who have serious health conditions or who are very sick. \"    \"In the event your heart stopped as a result of an underlying serious health condition, would you want attempts to be made to restart your heart (answer \"yes\" for attempt to resuscitate) or would you prefer a natural death (answer \"no\" for do not attempt to resuscitate)? \" no       [] Yes   [] No   Educated Patient / Sebastián Nilesh regarding differences between Advance Directives and portable DNR orders.     Length of ACP Conversation in minutes:  3    Conversation Outcomes:  ACP discussion completed    Follow-up plan:    [] Schedule follow-up conversation to continue planning  [] Referred individual to

## 2023-11-04 LAB
ALBUMIN SERPL-MCNC: 2.9 G/DL (ref 3.4–5)
ALBUMIN/GLOB SERPL: 1 {RATIO} (ref 1.1–2.2)
ALP SERPL-CCNC: 70 U/L (ref 40–129)
ALT SERPL-CCNC: 149 U/L (ref 10–40)
ANION GAP SERPL CALCULATED.3IONS-SCNC: 10 MMOL/L (ref 3–16)
AST SERPL-CCNC: 32 U/L (ref 15–37)
BASOPHILS # BLD: 0 K/UL (ref 0–0.2)
BASOPHILS NFR BLD: 0 %
BILIRUB SERPL-MCNC: 0.8 MG/DL (ref 0–1)
BUN SERPL-MCNC: 13 MG/DL (ref 7–20)
C DIFF TOX A+B STL QL IA: NORMAL
CALCIUM SERPL-MCNC: 8.3 MG/DL (ref 8.3–10.6)
CHLORIDE SERPL-SCNC: 106 MMOL/L (ref 99–110)
CO2 SERPL-SCNC: 24 MMOL/L (ref 21–32)
CREAT SERPL-MCNC: 0.9 MG/DL (ref 0.6–1.2)
DEPRECATED RDW RBC AUTO: 19.2 % (ref 12.4–15.4)
EOSINOPHIL # BLD: 0 K/UL (ref 0–0.6)
EOSINOPHIL NFR BLD: 0 %
GFR SERPLBLD CREATININE-BSD FMLA CKD-EPI: >60 ML/MIN/{1.73_M2}
GLUCOSE SERPL-MCNC: 99 MG/DL (ref 70–99)
HCT VFR BLD AUTO: 33.8 % (ref 36–48)
HGB BLD-MCNC: 10.9 G/DL (ref 12–16)
LACTOFERRIN STL QL IA: ABNORMAL
LYMPHOCYTES # BLD: 1.1 K/UL (ref 1–5.1)
LYMPHOCYTES NFR BLD: 3 %
MCH RBC QN AUTO: 28.6 PG (ref 26–34)
MCHC RBC AUTO-ENTMCNC: 32.1 G/DL (ref 31–36)
MCV RBC AUTO: 89.1 FL (ref 80–100)
METAMYELOCYTES NFR BLD MANUAL: 2 %
MONOCYTES # BLD: 0.8 K/UL (ref 0–1.3)
MONOCYTES NFR BLD: 2 %
MYELOCYTES NFR BLD MANUAL: 3 %
NEUTROPHILS # BLD: 35.7 K/UL (ref 1.7–7.7)
NEUTROPHILS NFR BLD: 80 %
NEUTS BAND NFR BLD MANUAL: 10 % (ref 0–7)
PLATELET # BLD AUTO: 343 K/UL (ref 135–450)
PLATELET BLD QL SMEAR: ADEQUATE
PMV BLD AUTO: 9 FL (ref 5–10.5)
POLYCHROMASIA BLD QL SMEAR: ABNORMAL
POTASSIUM SERPL-SCNC: 3.1 MMOL/L (ref 3.5–5.1)
PROT SERPL-MCNC: 5.9 G/DL (ref 6.4–8.2)
RBC # BLD AUTO: 3.8 M/UL (ref 4–5.2)
SLIDE REVIEW: ABNORMAL
SODIUM SERPL-SCNC: 140 MMOL/L (ref 136–145)
TOXIC GRANULES BLD QL SMEAR: PRESENT
WBC # BLD AUTO: 37.6 K/UL (ref 4–11)

## 2023-11-04 PROCEDURE — 2580000003 HC RX 258: Performed by: INTERNAL MEDICINE

## 2023-11-04 PROCEDURE — 87336 ENTAMOEB HIST DISPR AG IA: CPT

## 2023-11-04 PROCEDURE — 6370000000 HC RX 637 (ALT 250 FOR IP): Performed by: SURGERY

## 2023-11-04 PROCEDURE — 87328 CRYPTOSPORIDIUM AG IA: CPT

## 2023-11-04 PROCEDURE — 87324 CLOSTRIDIUM AG IA: CPT

## 2023-11-04 PROCEDURE — 87506 IADNA-DNA/RNA PROBE TQ 6-11: CPT

## 2023-11-04 PROCEDURE — 6360000002 HC RX W HCPCS: Performed by: INTERNAL MEDICINE

## 2023-11-04 PROCEDURE — 87449 NOS EACH ORGANISM AG IA: CPT

## 2023-11-04 PROCEDURE — 6370000000 HC RX 637 (ALT 250 FOR IP): Performed by: INTERNAL MEDICINE

## 2023-11-04 PROCEDURE — 83630 LACTOFERRIN FECAL (QUAL): CPT

## 2023-11-04 PROCEDURE — 94760 N-INVAS EAR/PLS OXIMETRY 1: CPT

## 2023-11-04 PROCEDURE — 36415 COLL VENOUS BLD VENIPUNCTURE: CPT

## 2023-11-04 PROCEDURE — 94640 AIRWAY INHALATION TREATMENT: CPT

## 2023-11-04 PROCEDURE — 85025 COMPLETE CBC W/AUTO DIFF WBC: CPT

## 2023-11-04 PROCEDURE — 80053 COMPREHEN METABOLIC PANEL: CPT

## 2023-11-04 PROCEDURE — 87493 C DIFF AMPLIFIED PROBE: CPT

## 2023-11-04 PROCEDURE — 99232 SBSQ HOSP IP/OBS MODERATE 35: CPT | Performed by: SURGERY

## 2023-11-04 PROCEDURE — 1200000000 HC SEMI PRIVATE

## 2023-11-04 RX ORDER — VANCOMYCIN HYDROCHLORIDE 125 MG/1
125 CAPSULE ORAL 4 TIMES DAILY
Status: DISCONTINUED | OUTPATIENT
Start: 2023-11-04 | End: 2023-11-04

## 2023-11-04 RX ORDER — POTASSIUM CHLORIDE 20 MEQ/1
40 TABLET, EXTENDED RELEASE ORAL ONCE
Status: COMPLETED | OUTPATIENT
Start: 2023-11-04 | End: 2023-11-04

## 2023-11-04 RX ADMIN — METRONIDAZOLE 500 MG: 500 INJECTION, SOLUTION INTRAVENOUS at 18:37

## 2023-11-04 RX ADMIN — FIDAXOMICIN 200 MG: 200 TABLET, FILM COATED ORAL at 20:55

## 2023-11-04 RX ADMIN — CLONIDINE HYDROCHLORIDE 0.1 MG: 0.1 TABLET ORAL at 20:54

## 2023-11-04 RX ADMIN — MOMETASONE FUROATE AND FORMOTEROL FUMARATE DIHYDRATE 2 PUFF: 200; 5 AEROSOL RESPIRATORY (INHALATION) at 20:05

## 2023-11-04 RX ADMIN — RANOLAZINE 500 MG: 500 TABLET, FILM COATED, EXTENDED RELEASE ORAL at 09:34

## 2023-11-04 RX ADMIN — FIDAXOMICIN 200 MG: 200 TABLET, FILM COATED ORAL at 11:56

## 2023-11-04 RX ADMIN — RANOLAZINE 500 MG: 500 TABLET, FILM COATED, EXTENDED RELEASE ORAL at 20:54

## 2023-11-04 RX ADMIN — HYDRALAZINE HYDROCHLORIDE 50 MG: 50 TABLET, FILM COATED ORAL at 14:41

## 2023-11-04 RX ADMIN — CEFTRIAXONE SODIUM 2000 MG: 2 INJECTION, POWDER, FOR SOLUTION INTRAMUSCULAR; INTRAVENOUS at 18:01

## 2023-11-04 RX ADMIN — HYDRALAZINE HYDROCHLORIDE 50 MG: 50 TABLET, FILM COATED ORAL at 20:54

## 2023-11-04 RX ADMIN — METRONIDAZOLE 500 MG: 500 INJECTION, SOLUTION INTRAVENOUS at 09:45

## 2023-11-04 RX ADMIN — TIOTROPIUM BROMIDE INHALATION SPRAY 2 PUFF: 3.12 SPRAY, METERED RESPIRATORY (INHALATION) at 09:09

## 2023-11-04 RX ADMIN — POTASSIUM CHLORIDE 40 MEQ: 1500 TABLET, EXTENDED RELEASE ORAL at 11:56

## 2023-11-04 RX ADMIN — DILTIAZEM HYDROCHLORIDE 120 MG: 120 CAPSULE, COATED, EXTENDED RELEASE ORAL at 09:34

## 2023-11-04 RX ADMIN — ISOSORBIDE MONONITRATE 120 MG: 60 TABLET, EXTENDED RELEASE ORAL at 09:34

## 2023-11-04 RX ADMIN — PANTOPRAZOLE SODIUM 40 MG: 40 TABLET, DELAYED RELEASE ORAL at 06:22

## 2023-11-04 RX ADMIN — METRONIDAZOLE 500 MG: 500 INJECTION, SOLUTION INTRAVENOUS at 02:10

## 2023-11-04 RX ADMIN — CLONIDINE HYDROCHLORIDE 0.1 MG: 0.1 TABLET ORAL at 09:34

## 2023-11-04 RX ADMIN — CARVEDILOL 6.25 MG: 6.25 TABLET, FILM COATED ORAL at 17:57

## 2023-11-04 RX ADMIN — ONDANSETRON 4 MG: 2 INJECTION INTRAMUSCULAR; INTRAVENOUS at 09:38

## 2023-11-04 RX ADMIN — MOMETASONE FUROATE AND FORMOTEROL FUMARATE DIHYDRATE 2 PUFF: 200; 5 AEROSOL RESPIRATORY (INHALATION) at 09:07

## 2023-11-04 RX ADMIN — CARVEDILOL 6.25 MG: 6.25 TABLET, FILM COATED ORAL at 09:34

## 2023-11-04 RX ADMIN — HYDRALAZINE HYDROCHLORIDE 50 MG: 50 TABLET, FILM COATED ORAL at 06:22

## 2023-11-04 RX ADMIN — ROSUVASTATIN CALCIUM 20 MG: 20 TABLET, FILM COATED ORAL at 09:34

## 2023-11-04 ASSESSMENT — PAIN SCALES - GENERAL: PAINLEVEL_OUTOF10: 0

## 2023-11-04 NOTE — PLAN OF CARE
Problem: Discharge Planning  Goal: Discharge to home or other facility with appropriate resources  11/4/2023 0308 by Aubrey Mckinnon RN  Outcome: Progressing  11/3/2023 1331 by Sherry Wharton RN  Outcome: Progressing  Flowsheets (Taken 11/3/2023 1000)  Discharge to home or other facility with appropriate resources: Identify barriers to discharge with patient and caregiver     Problem: Chronic Conditions and Co-morbidities  Goal: Patient's chronic conditions and co-morbidity symptoms are monitored and maintained or improved  11/4/2023 0308 by Aubrey Mckinnon RN  Outcome: Progressing  11/3/2023 1331 by Sherry Wharton RN  Outcome: Progressing  Flowsheets (Taken 11/3/2023 1000)  Care Plan - Patient's Chronic Conditions and Co-Morbidity Symptoms are Monitored and Maintained or Improved: Monitor and assess patient's chronic conditions and comorbid symptoms for stability, deterioration, or improvement

## 2023-11-04 NOTE — PLAN OF CARE
Problem: Discharge Planning  Goal: Discharge to home or other facility with appropriate resources  11/4/2023 1239 by Daisy Rojas RN  Outcome: Progressing  11/4/2023 0308 by Chivo Marr RN  Outcome: Progressing     Problem: Pain  Goal: Verbalizes/displays adequate comfort level or baseline comfort level  11/4/2023 1239 by Daisy Rojas RN  Outcome: Progressing  11/4/2023 0308 by Chivo Marr RN  Outcome: Progressing     Problem: ABCDS Injury Assessment  Goal: Absence of physical injury  11/4/2023 1239 by Daisy Rojas RN  Outcome: Progressing  11/4/2023 0308 by Chivo Marr RN  Outcome: Progressing     Problem: Safety - Adult  Goal: Free from fall injury  11/4/2023 1239 by Daisy Rojas RN  Outcome: Progressing  11/4/2023 0308 by Chivo Marr RN  Outcome: Progressing     Problem: Chronic Conditions and Co-morbidities  Goal: Patient's chronic conditions and co-morbidity symptoms are monitored and maintained or improved  11/4/2023 1239 by Daisy Rojas RN  Outcome: Progressing  11/4/2023 0308 by Chivo Marr RN  Outcome: Progressing

## 2023-11-05 PROBLEM — A04.72 C. DIFFICILE COLITIS: Status: ACTIVE | Noted: 2023-11-05

## 2023-11-05 PROBLEM — R10.9 ABDOMINAL PAIN: Status: ACTIVE | Noted: 2023-11-05

## 2023-11-05 PROBLEM — R06.00 DYSPNEA: Status: ACTIVE | Noted: 2023-09-01

## 2023-11-05 PROBLEM — R33.9 URINARY RETENTION: Status: ACTIVE | Noted: 2023-11-05

## 2023-11-05 LAB
ALBUMIN SERPL-MCNC: 2.5 G/DL (ref 3.4–5)
ALBUMIN/GLOB SERPL: 1 {RATIO} (ref 1.1–2.2)
ALP SERPL-CCNC: 162 U/L (ref 40–129)
ALT SERPL-CCNC: 7 U/L (ref 10–40)
ANION GAP SERPL CALCULATED.3IONS-SCNC: 15 MMOL/L (ref 3–16)
AST SERPL-CCNC: 16 U/L (ref 15–37)
BASOPHILS # BLD: 0 K/UL (ref 0–0.2)
BASOPHILS NFR BLD: 0 %
BILIRUB SERPL-MCNC: 0.3 MG/DL (ref 0–1)
BUN SERPL-MCNC: 55 MG/DL (ref 7–20)
C DIFF TOX GENS STL QL NAA+PROBE: ABNORMAL
CALCIUM SERPL-MCNC: 8.2 MG/DL (ref 8.3–10.6)
CHLORIDE SERPL-SCNC: 98 MMOL/L (ref 99–110)
CO2 SERPL-SCNC: 16 MMOL/L (ref 21–32)
CREAT SERPL-MCNC: 2.7 MG/DL (ref 0.6–1.2)
CRYPTOSP AG STL QL IA: NORMAL
DEPRECATED RDW RBC AUTO: 19 % (ref 12.4–15.4)
E HISTOLYT AG STL QL IA: NORMAL
EOSINOPHIL # BLD: 1.2 K/UL (ref 0–0.6)
EOSINOPHIL NFR BLD: 3 %
G LAMBLIA AG STL QL IA: NORMAL
GFR SERPLBLD CREATININE-BSD FMLA CKD-EPI: 18 ML/MIN/{1.73_M2}
GI PATHOGENS PNL STL NAA+PROBE: NORMAL
GLUCOSE SERPL-MCNC: 92 MG/DL (ref 70–99)
HCT VFR BLD AUTO: 34.4 % (ref 36–48)
HGB BLD-MCNC: 11 G/DL (ref 12–16)
LYMPHOCYTES # BLD: 1.9 K/UL (ref 1–5.1)
LYMPHOCYTES NFR BLD: 5 %
MAGNESIUM SERPL-MCNC: 1.9 MG/DL (ref 1.8–2.4)
MCH RBC QN AUTO: 28.6 PG (ref 26–34)
MCHC RBC AUTO-ENTMCNC: 32 G/DL (ref 31–36)
MCV RBC AUTO: 89.4 FL (ref 80–100)
METAMYELOCYTES NFR BLD MANUAL: 1 %
MONOCYTES # BLD: 1.9 K/UL (ref 0–1.3)
MONOCYTES NFR BLD: 5 %
NEUTROPHILS # BLD: 33.4 K/UL (ref 1.7–7.7)
NEUTROPHILS NFR BLD: 71 %
NEUTS BAND NFR BLD MANUAL: 15 % (ref 0–7)
ORGANISM: ABNORMAL
PLATELET # BLD AUTO: 348 K/UL (ref 135–450)
PMV BLD AUTO: 8.9 FL (ref 5–10.5)
POTASSIUM SERPL-SCNC: 4.3 MMOL/L (ref 3.5–5.1)
PROT SERPL-MCNC: 5 G/DL (ref 6.4–8.2)
RBC # BLD AUTO: 3.85 M/UL (ref 4–5.2)
SODIUM SERPL-SCNC: 129 MMOL/L (ref 136–145)
TOXIC GRANULES BLD QL SMEAR: PRESENT
WBC # BLD AUTO: 38.4 K/UL (ref 4–11)

## 2023-11-05 PROCEDURE — 6360000002 HC RX W HCPCS: Performed by: INTERNAL MEDICINE

## 2023-11-05 PROCEDURE — 2580000003 HC RX 258: Performed by: SURGERY

## 2023-11-05 PROCEDURE — 94640 AIRWAY INHALATION TREATMENT: CPT

## 2023-11-05 PROCEDURE — 36415 COLL VENOUS BLD VENIPUNCTURE: CPT

## 2023-11-05 PROCEDURE — 85025 COMPLETE CBC W/AUTO DIFF WBC: CPT

## 2023-11-05 PROCEDURE — 6370000000 HC RX 637 (ALT 250 FOR IP): Performed by: SURGERY

## 2023-11-05 PROCEDURE — 99223 1ST HOSP IP/OBS HIGH 75: CPT | Performed by: INTERNAL MEDICINE

## 2023-11-05 PROCEDURE — 99232 SBSQ HOSP IP/OBS MODERATE 35: CPT | Performed by: SURGERY

## 2023-11-05 PROCEDURE — 1200000000 HC SEMI PRIVATE

## 2023-11-05 PROCEDURE — 83735 ASSAY OF MAGNESIUM: CPT

## 2023-11-05 PROCEDURE — 80053 COMPREHEN METABOLIC PANEL: CPT

## 2023-11-05 PROCEDURE — 6370000000 HC RX 637 (ALT 250 FOR IP): Performed by: INTERNAL MEDICINE

## 2023-11-05 RX ORDER — VANCOMYCIN HYDROCHLORIDE 125 MG/1
125 CAPSULE ORAL 4 TIMES DAILY
Status: DISCONTINUED | OUTPATIENT
Start: 2023-11-05 | End: 2023-11-06

## 2023-11-05 RX ORDER — HEPARIN SODIUM 5000 [USP'U]/ML
5000 INJECTION, SOLUTION INTRAVENOUS; SUBCUTANEOUS EVERY 8 HOURS SCHEDULED
Status: DISCONTINUED | OUTPATIENT
Start: 2023-11-05 | End: 2023-11-08 | Stop reason: HOSPADM

## 2023-11-05 RX ORDER — 0.9 % SODIUM CHLORIDE 0.9 %
500 INTRAVENOUS SOLUTION INTRAVENOUS ONCE
Status: COMPLETED | OUTPATIENT
Start: 2023-11-05 | End: 2023-11-05

## 2023-11-05 RX ADMIN — VANCOMYCIN HYDROCHLORIDE 125 MG: 125 CAPSULE ORAL at 17:28

## 2023-11-05 RX ADMIN — MOMETASONE FUROATE AND FORMOTEROL FUMARATE DIHYDRATE 2 PUFF: 200; 5 AEROSOL RESPIRATORY (INHALATION) at 19:46

## 2023-11-05 RX ADMIN — TIOTROPIUM BROMIDE INHALATION SPRAY 2 PUFF: 3.12 SPRAY, METERED RESPIRATORY (INHALATION) at 09:05

## 2023-11-05 RX ADMIN — FIDAXOMICIN 200 MG: 200 TABLET, FILM COATED ORAL at 09:49

## 2023-11-05 RX ADMIN — CLONIDINE HYDROCHLORIDE 0.1 MG: 0.1 TABLET ORAL at 09:49

## 2023-11-05 RX ADMIN — DILTIAZEM HYDROCHLORIDE 120 MG: 120 CAPSULE, COATED, EXTENDED RELEASE ORAL at 09:49

## 2023-11-05 RX ADMIN — SODIUM CHLORIDE 500 ML: 9 INJECTION, SOLUTION INTRAVENOUS at 12:41

## 2023-11-05 RX ADMIN — MOMETASONE FUROATE AND FORMOTEROL FUMARATE DIHYDRATE 2 PUFF: 200; 5 AEROSOL RESPIRATORY (INHALATION) at 09:05

## 2023-11-05 RX ADMIN — RANOLAZINE 500 MG: 500 TABLET, FILM COATED, EXTENDED RELEASE ORAL at 21:33

## 2023-11-05 RX ADMIN — HEPARIN SODIUM 5000 UNITS: 5000 INJECTION INTRAVENOUS; SUBCUTANEOUS at 21:33

## 2023-11-05 RX ADMIN — CARVEDILOL 6.25 MG: 6.25 TABLET, FILM COATED ORAL at 09:49

## 2023-11-05 RX ADMIN — FIDAXOMICIN 200 MG: 200 TABLET, FILM COATED ORAL at 21:33

## 2023-11-05 RX ADMIN — HYDRALAZINE HYDROCHLORIDE 50 MG: 50 TABLET, FILM COATED ORAL at 14:22

## 2023-11-05 RX ADMIN — RANOLAZINE 500 MG: 500 TABLET, FILM COATED, EXTENDED RELEASE ORAL at 09:49

## 2023-11-05 RX ADMIN — CARVEDILOL 6.25 MG: 6.25 TABLET, FILM COATED ORAL at 17:28

## 2023-11-05 RX ADMIN — VANCOMYCIN HYDROCHLORIDE 125 MG: 125 CAPSULE ORAL at 14:22

## 2023-11-05 RX ADMIN — HYDRALAZINE HYDROCHLORIDE 50 MG: 50 TABLET, FILM COATED ORAL at 21:33

## 2023-11-05 RX ADMIN — METRONIDAZOLE 500 MG: 500 INJECTION, SOLUTION INTRAVENOUS at 10:13

## 2023-11-05 RX ADMIN — VANCOMYCIN HYDROCHLORIDE 125 MG: 125 CAPSULE ORAL at 21:33

## 2023-11-05 RX ADMIN — ROSUVASTATIN CALCIUM 20 MG: 20 TABLET, FILM COATED ORAL at 09:49

## 2023-11-05 RX ADMIN — METRONIDAZOLE 500 MG: 500 INJECTION, SOLUTION INTRAVENOUS at 02:49

## 2023-11-05 RX ADMIN — METRONIDAZOLE 500 MG: 500 INJECTION, SOLUTION INTRAVENOUS at 17:31

## 2023-11-05 RX ADMIN — HEPARIN SODIUM 5000 UNITS: 5000 INJECTION INTRAVENOUS; SUBCUTANEOUS at 14:22

## 2023-11-05 RX ADMIN — HYDRALAZINE HYDROCHLORIDE 50 MG: 50 TABLET, FILM COATED ORAL at 05:13

## 2023-11-05 RX ADMIN — PANTOPRAZOLE SODIUM 40 MG: 40 TABLET, DELAYED RELEASE ORAL at 05:13

## 2023-11-05 RX ADMIN — ISOSORBIDE MONONITRATE 120 MG: 60 TABLET, EXTENDED RELEASE ORAL at 09:49

## 2023-11-05 RX ADMIN — CLONIDINE HYDROCHLORIDE 0.1 MG: 0.1 TABLET ORAL at 21:33

## 2023-11-05 ASSESSMENT — ENCOUNTER SYMPTOMS
BACK PAIN: 0
EYE DISCHARGE: 0
EYE REDNESS: 0
RHINORRHEA: 0
DIARRHEA: 1
SINUS PAIN: 0
COUGH: 0
NAUSEA: 0
SORE THROAT: 0
ABDOMINAL PAIN: 0
CONSTIPATION: 0
SINUS PRESSURE: 0
SHORTNESS OF BREATH: 0
WHEEZING: 0

## 2023-11-05 ASSESSMENT — PAIN SCALES - GENERAL: PAINLEVEL_OUTOF10: 0

## 2023-11-05 NOTE — PLAN OF CARE
Problem: Discharge Planning  Goal: Discharge to home or other facility with appropriate resources  11/4/2023 2055 by Lorena Hood RN  Outcome: Progressing  11/4/2023 1239 by Devan Cruz RN  Outcome: Progressing     Problem: Pain  Goal: Verbalizes/displays adequate comfort level or baseline comfort level  11/4/2023 2055 by Lorena Hood RN  Outcome: Progressing  11/4/2023 1239 by Devan Cruz RN  Outcome: Progressing     Problem: ABCDS Injury Assessment  Goal: Absence of physical injury  11/4/2023 2055 by Lorena Hood RN  Outcome: Progressing  11/4/2023 1239 by Devan Cruz RN  Outcome: Progressing     Problem: Safety - Adult  Goal: Free from fall injury  11/4/2023 2055 by Lorena Hood RN  Outcome: Progressing  11/4/2023 1239 by Devan Cruz RN  Outcome: Progressing     Problem: Chronic Conditions and Co-morbidities  Goal: Patient's chronic conditions and co-morbidity symptoms are monitored and maintained or improved  11/4/2023 2055 by Lorena Hood RN  Outcome: Progressing  11/4/2023 1239 by Devan Cruz RN  Outcome: Progressing

## 2023-11-06 PROBLEM — D72.9 NEUTROPHILIA: Status: ACTIVE | Noted: 2023-11-06

## 2023-11-06 LAB
ALBUMIN SERPL-MCNC: 2.4 G/DL (ref 3.4–5)
ANION GAP SERPL CALCULATED.3IONS-SCNC: 13 MMOL/L (ref 3–16)
ANISOCYTOSIS BLD QL SMEAR: ABNORMAL
BACTERIA BLD CULT ORG #2: NORMAL
BACTERIA BLD CULT: NORMAL
BASOPHILS # BLD: 0 K/UL (ref 0–0.2)
BASOPHILS NFR BLD: 0 %
BUN SERPL-MCNC: 55 MG/DL (ref 7–20)
CALCIUM SERPL-MCNC: 7.7 MG/DL (ref 8.3–10.6)
CHLORIDE SERPL-SCNC: 102 MMOL/L (ref 99–110)
CO2 SERPL-SCNC: 16 MMOL/L (ref 21–32)
CREAT SERPL-MCNC: 2.5 MG/DL (ref 0.6–1.2)
DACRYOCYTES BLD QL SMEAR: ABNORMAL
DEPRECATED RDW RBC AUTO: 19.3 % (ref 12.4–15.4)
EOSINOPHIL # BLD: 0.8 K/UL (ref 0–0.6)
EOSINOPHIL NFR BLD: 2 %
GFR SERPLBLD CREATININE-BSD FMLA CKD-EPI: 19 ML/MIN/{1.73_M2}
GLUCOSE SERPL-MCNC: 89 MG/DL (ref 70–99)
HCT VFR BLD AUTO: 32 % (ref 36–48)
HGB BLD-MCNC: 10 G/DL (ref 12–16)
LYMPHOCYTES # BLD: 1.5 K/UL (ref 1–5.1)
LYMPHOCYTES NFR BLD: 4 %
MCH RBC QN AUTO: 28.4 PG (ref 26–34)
MCHC RBC AUTO-ENTMCNC: 31.1 G/DL (ref 31–36)
MCV RBC AUTO: 91.3 FL (ref 80–100)
METAMYELOCYTES NFR BLD MANUAL: 2 %
MONOCYTES # BLD: 0.4 K/UL (ref 0–1.3)
MONOCYTES NFR BLD: 1 %
NEUTROPHILS # BLD: 35.7 K/UL (ref 1.7–7.7)
NEUTROPHILS NFR BLD: 85 %
NEUTS BAND NFR BLD MANUAL: 6 % (ref 0–7)
OVALOCYTES BLD QL SMEAR: ABNORMAL
PHOSPHATE SERPL-MCNC: 3.3 MG/DL (ref 2.5–4.9)
PLATELET # BLD AUTO: 316 K/UL (ref 135–450)
PMV BLD AUTO: 8.7 FL (ref 5–10.5)
POLYCHROMASIA BLD QL SMEAR: ABNORMAL
POTASSIUM SERPL-SCNC: 3.9 MMOL/L (ref 3.5–5.1)
RBC # BLD AUTO: 3.51 M/UL (ref 4–5.2)
SODIUM SERPL-SCNC: 131 MMOL/L (ref 136–145)
TOXIC GRANULES BLD QL SMEAR: PRESENT
WBC # BLD AUTO: 38.4 K/UL (ref 4–11)

## 2023-11-06 PROCEDURE — 97530 THERAPEUTIC ACTIVITIES: CPT

## 2023-11-06 PROCEDURE — 94760 N-INVAS EAR/PLS OXIMETRY 1: CPT

## 2023-11-06 PROCEDURE — 80069 RENAL FUNCTION PANEL: CPT

## 2023-11-06 PROCEDURE — 6370000000 HC RX 637 (ALT 250 FOR IP): Performed by: SURGERY

## 2023-11-06 PROCEDURE — 6360000002 HC RX W HCPCS: Performed by: INTERNAL MEDICINE

## 2023-11-06 PROCEDURE — APPNB15 APP NON BILLABLE TIME 0-15 MINS: Performed by: PHYSICIAN ASSISTANT

## 2023-11-06 PROCEDURE — 6370000000 HC RX 637 (ALT 250 FOR IP): Performed by: PHYSICIAN ASSISTANT

## 2023-11-06 PROCEDURE — 85025 COMPLETE CBC W/AUTO DIFF WBC: CPT

## 2023-11-06 PROCEDURE — 99233 SBSQ HOSP IP/OBS HIGH 50: CPT | Performed by: INTERNAL MEDICINE

## 2023-11-06 PROCEDURE — 6370000000 HC RX 637 (ALT 250 FOR IP): Performed by: INTERNAL MEDICINE

## 2023-11-06 PROCEDURE — 36415 COLL VENOUS BLD VENIPUNCTURE: CPT

## 2023-11-06 PROCEDURE — 99232 SBSQ HOSP IP/OBS MODERATE 35: CPT | Performed by: SURGERY

## 2023-11-06 PROCEDURE — 94640 AIRWAY INHALATION TREATMENT: CPT

## 2023-11-06 PROCEDURE — 1200000000 HC SEMI PRIVATE

## 2023-11-06 RX ORDER — VANCOMYCIN HYDROCHLORIDE 250 MG/1
500 CAPSULE ORAL 4 TIMES DAILY
Status: DISCONTINUED | OUTPATIENT
Start: 2023-11-06 | End: 2023-11-08 | Stop reason: HOSPADM

## 2023-11-06 RX ADMIN — CLONIDINE HYDROCHLORIDE 0.1 MG: 0.1 TABLET ORAL at 22:05

## 2023-11-06 RX ADMIN — METRONIDAZOLE 500 MG: 500 INJECTION, SOLUTION INTRAVENOUS at 17:40

## 2023-11-06 RX ADMIN — HYDRALAZINE HYDROCHLORIDE 50 MG: 50 TABLET, FILM COATED ORAL at 13:16

## 2023-11-06 RX ADMIN — TIOTROPIUM BROMIDE INHALATION SPRAY 2 PUFF: 3.12 SPRAY, METERED RESPIRATORY (INHALATION) at 08:52

## 2023-11-06 RX ADMIN — ROSUVASTATIN CALCIUM 20 MG: 20 TABLET, FILM COATED ORAL at 08:27

## 2023-11-06 RX ADMIN — CLONIDINE HYDROCHLORIDE 0.1 MG: 0.1 TABLET ORAL at 08:27

## 2023-11-06 RX ADMIN — RANOLAZINE 500 MG: 500 TABLET, FILM COATED, EXTENDED RELEASE ORAL at 22:05

## 2023-11-06 RX ADMIN — NYSTATIN 500000 UNITS: 100000 SUSPENSION ORAL at 22:14

## 2023-11-06 RX ADMIN — RANOLAZINE 500 MG: 500 TABLET, FILM COATED, EXTENDED RELEASE ORAL at 08:26

## 2023-11-06 RX ADMIN — FIDAXOMICIN 200 MG: 200 TABLET, FILM COATED ORAL at 08:26

## 2023-11-06 RX ADMIN — VANCOMYCIN HYDROCHLORIDE 500 MG: 250 CAPSULE ORAL at 22:06

## 2023-11-06 RX ADMIN — NYSTATIN 500000 UNITS: 100000 SUSPENSION ORAL at 17:38

## 2023-11-06 RX ADMIN — HYDRALAZINE HYDROCHLORIDE 50 MG: 50 TABLET, FILM COATED ORAL at 05:48

## 2023-11-06 RX ADMIN — MOMETASONE FUROATE AND FORMOTEROL FUMARATE DIHYDRATE 2 PUFF: 200; 5 AEROSOL RESPIRATORY (INHALATION) at 08:52

## 2023-11-06 RX ADMIN — METRONIDAZOLE 500 MG: 500 INJECTION, SOLUTION INTRAVENOUS at 09:55

## 2023-11-06 RX ADMIN — VANCOMYCIN HYDROCHLORIDE 500 MG: 250 CAPSULE ORAL at 13:16

## 2023-11-06 RX ADMIN — DILTIAZEM HYDROCHLORIDE 120 MG: 120 CAPSULE, COATED, EXTENDED RELEASE ORAL at 08:27

## 2023-11-06 RX ADMIN — CARVEDILOL 6.25 MG: 6.25 TABLET, FILM COATED ORAL at 17:30

## 2023-11-06 RX ADMIN — MOMETASONE FUROATE AND FORMOTEROL FUMARATE DIHYDRATE 2 PUFF: 200; 5 AEROSOL RESPIRATORY (INHALATION) at 19:25

## 2023-11-06 RX ADMIN — HEPARIN SODIUM 5000 UNITS: 5000 INJECTION INTRAVENOUS; SUBCUTANEOUS at 22:14

## 2023-11-06 RX ADMIN — CARVEDILOL 6.25 MG: 6.25 TABLET, FILM COATED ORAL at 08:27

## 2023-11-06 RX ADMIN — ISOSORBIDE MONONITRATE 120 MG: 60 TABLET, EXTENDED RELEASE ORAL at 08:26

## 2023-11-06 RX ADMIN — HEPARIN SODIUM 5000 UNITS: 5000 INJECTION INTRAVENOUS; SUBCUTANEOUS at 13:16

## 2023-11-06 RX ADMIN — METRONIDAZOLE 500 MG: 500 INJECTION, SOLUTION INTRAVENOUS at 02:38

## 2023-11-06 RX ADMIN — FIDAXOMICIN 200 MG: 200 TABLET, FILM COATED ORAL at 22:20

## 2023-11-06 RX ADMIN — NYSTATIN 500000 UNITS: 100000 SUSPENSION ORAL at 13:15

## 2023-11-06 RX ADMIN — VANCOMYCIN HYDROCHLORIDE 125 MG: 125 CAPSULE ORAL at 08:27

## 2023-11-06 RX ADMIN — VANCOMYCIN HYDROCHLORIDE 500 MG: 250 CAPSULE ORAL at 17:30

## 2023-11-06 RX ADMIN — HEPARIN SODIUM 5000 UNITS: 5000 INJECTION INTRAVENOUS; SUBCUTANEOUS at 05:51

## 2023-11-06 RX ADMIN — PANTOPRAZOLE SODIUM 40 MG: 40 TABLET, DELAYED RELEASE ORAL at 05:48

## 2023-11-06 RX ADMIN — HYDRALAZINE HYDROCHLORIDE 50 MG: 50 TABLET, FILM COATED ORAL at 22:05

## 2023-11-06 NOTE — CARE COORDINATION
11/6 Chart reviewed. Plan: Return to [facility]. No precert required. New ID consult 11/5-Elev. WBC and Cr. C-dif +. Need BLS transport.  Electronically signed by Fabrizio Cantor RN on 11/6/2023 at 8:28 AM
retention [R33.9]  Colitis [K52.9]  MARK (acute kidney injury) ([address]) [N17.9]  Abdominal pain, unspecified abdominal location [R10.9]  Urinary tract infection without hematuria, site unspecified [N39.0]  Leukocytosis, unspecified type [D72.829]  Dyspnea, unspecified type [Z49.13]    IF APPLICABLE: The Patient and/or patient representative Magdi Andrews and her family were provided with a choice of provider and agrees with the discharge plan. Freedom of choice list with basic dialogue that supports the patient's individualized plan of care/goals and shares the quality data associated with the providers was provided to: (P) Patient   Patient Representative Name:       The Patient and/or Patient Representative Agree with the Discharge Plan? (P) Yes    Patient wants to return to [address] [place] [address], RN  Case Management Department  Ph: [phone]      11/03/23 1350   Service Assessment   Patient Orientation Alert and Oriented   Cognition Alert   History Provided By Patient   Primary Caregiver Self   Accompanied By/Relationship self   Support Systems Spouse/Significant Other;Children;Family Members   Patient's Healthcare Decision Maker is: Legal Next of [address] [place] [address]   PCP Verified by CM Yes   Last Visit to PCP Within last year   Prior Functional Level Assistance with the following:;Bathing;Dressing; Toileting;Cooking;Housework; Shopping;Mobility   Current Functional Level Assistance with the following:;Bathing;Dressing; Toileting;Cooking;Housework; Shopping;Mobility   Can patient return to prior living arrangement Other (see comment)  (snf skilled)   Ability to make needs known: Poor   Family able to assist with home care needs: No   Would you like for me to discuss the discharge plan with any other family members/significant others, and if so, who?  Yes  ()   Financial Resources Medicare   Community Resources ECF/Home Care   CM/SW Referral Other (see comment)  (snf)   Discharge Planning   Type of Residence

## 2023-11-06 NOTE — PLAN OF CARE
Problem: Discharge Planning  Goal: Discharge to home or other facility with appropriate resources  11/6/2023 0928 by Pia Leroy RN  Outcome: Progressing  11/5/2023 2211 by Rocael Cross RN  Outcome: Progressing     Problem: Pain  Goal: Verbalizes/displays adequate comfort level or baseline comfort level  11/6/2023 0928 by Pia Leroy RN  Outcome: Progressing  11/5/2023 2211 by Rocael Cross RN  Outcome: Progressing     Problem: ABCDS Injury Assessment  Goal: Absence of physical injury  11/6/2023 0928 by Pia Leroy RN  Outcome: Progressing  11/5/2023 2211 by Rocael Cross RN  Outcome: Progressing     Problem: Safety - Adult  Goal: Free from fall injury  11/6/2023 0928 by Pia Leroy RN  Outcome: Progressing  11/5/2023 2211 by Rocael Cross RN  Outcome: Progressing     Problem: Chronic Conditions and Co-morbidities  Goal: Patient's chronic conditions and co-morbidity symptoms are monitored and maintained or improved  11/6/2023 0928 by Pia Leroy RN  Outcome: Progressing  11/5/2023 2211 by Rocael Cross RN  Outcome: Progressing     Problem: Skin/Tissue Integrity  Goal: Absence of new skin breakdown  Description: 1. Monitor for areas of redness and/or skin breakdown  2. Assess vascular access sites hourly  3. Every 4-6 hours minimum:  Change oxygen saturation probe site  4. Every 4-6 hours:  If on nasal continuous positive airway pressure, respiratory therapy assess nares and determine need for appliance change or resting period.   11/6/2023 0928 by Pia Leroy RN  Outcome: Progressing  11/5/2023 2211 by Rocael Cross RN  Outcome: Progressing

## 2023-11-06 NOTE — PLAN OF CARE
Problem: Discharge Planning  Goal: Discharge to home or other facility with appropriate resources  11/5/2023 2211 by Diana Carr RN  Outcome: Progressing  11/5/2023 1434 by Kole Segura RN  Outcome: Progressing     Problem: Pain  Goal: Verbalizes/displays adequate comfort level or baseline comfort level  11/5/2023 2211 by Diana Carr RN  Outcome: Progressing  11/5/2023 1434 by Kole Segura RN  Outcome: Progressing     Problem: ABCDS Injury Assessment  Goal: Absence of physical injury  11/5/2023 2211 by Diana Carr RN  Outcome: Progressing  11/5/2023 1434 by Kole Segura RN  Outcome: Progressing     Problem: Safety - Adult  Goal: Free from fall injury  11/5/2023 2211 by Diana Carr RN  Outcome: Progressing  11/5/2023 1434 by Kole Segura RN  Outcome: Progressing     Problem: Chronic Conditions and Co-morbidities  Goal: Patient's chronic conditions and co-morbidity symptoms are monitored and maintained or improved  11/5/2023 2211 by Diana Carr RN  Outcome: Progressing  11/5/2023 1434 by Kole Segura RN  Outcome: Progressing     Problem: Skin/Tissue Integrity  Goal: Absence of new skin breakdown  Description: 1. Monitor for areas of redness and/or skin breakdown  2. Assess vascular access sites hourly  3. Every 4-6 hours minimum:  Change oxygen saturation probe site  4. Every 4-6 hours:  If on nasal continuous positive airway pressure, respiratory therapy assess nares and determine need for appliance change or resting period.   11/5/2023 2211 by Natalie Hart RN  Outcome: Progressing  11/5/2023 1434 by Kole Segura RN  Outcome: Progressing

## 2023-11-07 ENCOUNTER — APPOINTMENT (OUTPATIENT)
Dept: GENERAL RADIOLOGY | Age: 78
DRG: 872 | End: 2023-11-07
Payer: MEDICARE

## 2023-11-07 VITALS
SYSTOLIC BLOOD PRESSURE: 147 MMHG | BODY MASS INDEX: 39.83 KG/M2 | TEMPERATURE: 97.4 F | OXYGEN SATURATION: 98 % | WEIGHT: 224.87 LBS | RESPIRATION RATE: 17 BRPM | HEART RATE: 75 BPM | DIASTOLIC BLOOD PRESSURE: 76 MMHG

## 2023-11-07 LAB
ACANTHOCYTES BLD QL SMEAR: ABNORMAL
ANION GAP SERPL CALCULATED.3IONS-SCNC: 11 MMOL/L (ref 3–16)
ANISOCYTOSIS BLD QL SMEAR: ABNORMAL
BASOPHILS # BLD: 0 K/UL (ref 0–0.2)
BASOPHILS NFR BLD: 0 %
BUN SERPL-MCNC: 51 MG/DL (ref 7–20)
BURR CELLS BLD QL SMEAR: ABNORMAL
CALCIUM SERPL-MCNC: 7.9 MG/DL (ref 8.3–10.6)
CHLORIDE SERPL-SCNC: 103 MMOL/L (ref 99–110)
CO2 SERPL-SCNC: 17 MMOL/L (ref 21–32)
CREAT SERPL-MCNC: 2.3 MG/DL (ref 0.6–1.2)
DEPRECATED RDW RBC AUTO: 19 % (ref 12.4–15.4)
EOSINOPHIL # BLD: 0.4 K/UL (ref 0–0.6)
EOSINOPHIL NFR BLD: 1 %
GFR SERPLBLD CREATININE-BSD FMLA CKD-EPI: 21 ML/MIN/{1.73_M2}
GLUCOSE SERPL-MCNC: 97 MG/DL (ref 70–99)
HCT VFR BLD AUTO: 35.1 % (ref 36–48)
HGB BLD-MCNC: 11.2 G/DL (ref 12–16)
HYPOCHROMIA BLD QL SMEAR: ABNORMAL
LACTATE BLDV-SCNC: 0.7 MMOL/L (ref 0.4–2)
LYMPHOCYTES # BLD: 0 K/UL (ref 1–5.1)
LYMPHOCYTES NFR BLD: 0 %
MCH RBC QN AUTO: 28.5 PG (ref 26–34)
MCHC RBC AUTO-ENTMCNC: 31.8 G/DL (ref 31–36)
MCV RBC AUTO: 89.8 FL (ref 80–100)
METAMYELOCYTES NFR BLD MANUAL: 4 %
MONOCYTES # BLD: 0.4 K/UL (ref 0–1.3)
MONOCYTES NFR BLD: 1 %
MYELOCYTES NFR BLD MANUAL: 1 %
NEUTROPHILS # BLD: 39.1 K/UL (ref 1.7–7.7)
NEUTROPHILS NFR BLD: 78 %
NEUTS BAND NFR BLD MANUAL: 15 % (ref 0–7)
OVALOCYTES BLD QL SMEAR: ABNORMAL
PLATELET # BLD AUTO: 313 K/UL (ref 135–450)
PLATELET BLD QL SMEAR: ADEQUATE
PMV BLD AUTO: 8.7 FL (ref 5–10.5)
POIKILOCYTOSIS BLD QL SMEAR: ABNORMAL
POLYCHROMASIA BLD QL SMEAR: ABNORMAL
POTASSIUM SERPL-SCNC: 5.1 MMOL/L (ref 3.5–5.1)
RBC # BLD AUTO: 3.91 M/UL (ref 4–5.2)
SLIDE REVIEW: ABNORMAL
SMUDGE CELLS BLD QL SMEAR: PRESENT
SODIUM SERPL-SCNC: 131 MMOL/L (ref 136–145)
TOXIC GRANULES BLD QL SMEAR: PRESENT
WBC # BLD AUTO: 39.9 K/UL (ref 4–11)

## 2023-11-07 PROCEDURE — 6360000002 HC RX W HCPCS: Performed by: INTERNAL MEDICINE

## 2023-11-07 PROCEDURE — 6370000000 HC RX 637 (ALT 250 FOR IP): Performed by: PHYSICIAN ASSISTANT

## 2023-11-07 PROCEDURE — 51702 INSERT TEMP BLADDER CATH: CPT

## 2023-11-07 PROCEDURE — 85025 COMPLETE CBC W/AUTO DIFF WBC: CPT

## 2023-11-07 PROCEDURE — 94760 N-INVAS EAR/PLS OXIMETRY 1: CPT

## 2023-11-07 PROCEDURE — 6370000000 HC RX 637 (ALT 250 FOR IP): Performed by: INTERNAL MEDICINE

## 2023-11-07 PROCEDURE — 6370000000 HC RX 637 (ALT 250 FOR IP): Performed by: SURGERY

## 2023-11-07 PROCEDURE — 99232 SBSQ HOSP IP/OBS MODERATE 35: CPT | Performed by: SURGERY

## 2023-11-07 PROCEDURE — 99233 SBSQ HOSP IP/OBS HIGH 50: CPT | Performed by: INTERNAL MEDICINE

## 2023-11-07 PROCEDURE — 97530 THERAPEUTIC ACTIVITIES: CPT

## 2023-11-07 PROCEDURE — APPNB15 APP NON BILLABLE TIME 0-15 MINS: Performed by: PHYSICIAN ASSISTANT

## 2023-11-07 PROCEDURE — 97535 SELF CARE MNGMENT TRAINING: CPT

## 2023-11-07 PROCEDURE — 2580000003 HC RX 258: Performed by: SURGERY

## 2023-11-07 PROCEDURE — 1200000000 HC SEMI PRIVATE

## 2023-11-07 PROCEDURE — 80048 BASIC METABOLIC PNL TOTAL CA: CPT

## 2023-11-07 PROCEDURE — 83605 ASSAY OF LACTIC ACID: CPT

## 2023-11-07 PROCEDURE — APPSS15 APP SPLIT SHARED TIME 0-15 MINUTES: Performed by: PHYSICIAN ASSISTANT

## 2023-11-07 PROCEDURE — 94640 AIRWAY INHALATION TREATMENT: CPT

## 2023-11-07 PROCEDURE — 74018 RADEX ABDOMEN 1 VIEW: CPT

## 2023-11-07 PROCEDURE — 36415 COLL VENOUS BLD VENIPUNCTURE: CPT

## 2023-11-07 RX ORDER — METRONIDAZOLE 500 MG/100ML
500 INJECTION, SOLUTION INTRAVENOUS EVERY 8 HOURS
Qty: 1 ML | Refills: 0
Start: 2023-11-07

## 2023-11-07 RX ORDER — VANCOMYCIN HYDROCHLORIDE 250 MG/1
500 CAPSULE ORAL 4 TIMES DAILY
Qty: 80 CAPSULE | Refills: 0
Start: 2023-11-07 | End: 2023-11-17

## 2023-11-07 RX ADMIN — NYSTATIN 500000 UNITS: 100000 SUSPENSION ORAL at 18:37

## 2023-11-07 RX ADMIN — CARVEDILOL 6.25 MG: 6.25 TABLET, FILM COATED ORAL at 09:32

## 2023-11-07 RX ADMIN — VANCOMYCIN HYDROCHLORIDE 500 MG: 250 CAPSULE ORAL at 09:31

## 2023-11-07 RX ADMIN — FIDAXOMICIN 200 MG: 200 TABLET, FILM COATED ORAL at 20:35

## 2023-11-07 RX ADMIN — RANOLAZINE 500 MG: 500 TABLET, FILM COATED, EXTENDED RELEASE ORAL at 09:32

## 2023-11-07 RX ADMIN — ROSUVASTATIN CALCIUM 20 MG: 20 TABLET, FILM COATED ORAL at 09:32

## 2023-11-07 RX ADMIN — METRONIDAZOLE 500 MG: 500 INJECTION, SOLUTION INTRAVENOUS at 02:05

## 2023-11-07 RX ADMIN — METRONIDAZOLE 500 MG: 500 INJECTION, SOLUTION INTRAVENOUS at 09:42

## 2023-11-07 RX ADMIN — NYSTATIN 500000 UNITS: 100000 SUSPENSION ORAL at 14:12

## 2023-11-07 RX ADMIN — HEPARIN SODIUM 5000 UNITS: 5000 INJECTION INTRAVENOUS; SUBCUTANEOUS at 05:58

## 2023-11-07 RX ADMIN — NYSTATIN 500000 UNITS: 100000 SUSPENSION ORAL at 20:36

## 2023-11-07 RX ADMIN — MOMETASONE FUROATE AND FORMOTEROL FUMARATE DIHYDRATE 2 PUFF: 200; 5 AEROSOL RESPIRATORY (INHALATION) at 07:24

## 2023-11-07 RX ADMIN — HYDRALAZINE HYDROCHLORIDE 50 MG: 50 TABLET, FILM COATED ORAL at 20:28

## 2023-11-07 RX ADMIN — CLONIDINE HYDROCHLORIDE 0.1 MG: 0.1 TABLET ORAL at 09:31

## 2023-11-07 RX ADMIN — HEPARIN SODIUM 5000 UNITS: 5000 INJECTION INTRAVENOUS; SUBCUTANEOUS at 14:12

## 2023-11-07 RX ADMIN — ISOSORBIDE MONONITRATE 120 MG: 60 TABLET, EXTENDED RELEASE ORAL at 09:31

## 2023-11-07 RX ADMIN — DILTIAZEM HYDROCHLORIDE 120 MG: 120 CAPSULE, COATED, EXTENDED RELEASE ORAL at 09:31

## 2023-11-07 RX ADMIN — HYDRALAZINE HYDROCHLORIDE 50 MG: 50 TABLET, FILM COATED ORAL at 14:41

## 2023-11-07 RX ADMIN — RANOLAZINE 500 MG: 500 TABLET, FILM COATED, EXTENDED RELEASE ORAL at 20:31

## 2023-11-07 RX ADMIN — NYSTATIN 500000 UNITS: 100000 SUSPENSION ORAL at 09:32

## 2023-11-07 RX ADMIN — VANCOMYCIN HYDROCHLORIDE 500 MG: 250 CAPSULE ORAL at 20:31

## 2023-11-07 RX ADMIN — SODIUM CHLORIDE: 9 INJECTION, SOLUTION INTRAVENOUS at 06:01

## 2023-11-07 RX ADMIN — VANCOMYCIN HYDROCHLORIDE 500 MG: 250 CAPSULE ORAL at 18:37

## 2023-11-07 RX ADMIN — METRONIDAZOLE 500 MG: 500 INJECTION, SOLUTION INTRAVENOUS at 18:38

## 2023-11-07 RX ADMIN — HYDRALAZINE HYDROCHLORIDE 50 MG: 50 TABLET, FILM COATED ORAL at 05:58

## 2023-11-07 RX ADMIN — HEPARIN SODIUM 5000 UNITS: 5000 INJECTION INTRAVENOUS; SUBCUTANEOUS at 20:35

## 2023-11-07 RX ADMIN — FIDAXOMICIN 200 MG: 200 TABLET, FILM COATED ORAL at 10:15

## 2023-11-07 RX ADMIN — CLONIDINE HYDROCHLORIDE 0.1 MG: 0.1 TABLET ORAL at 20:30

## 2023-11-07 RX ADMIN — CARVEDILOL 6.25 MG: 6.25 TABLET, FILM COATED ORAL at 18:37

## 2023-11-07 RX ADMIN — MOMETASONE FUROATE AND FORMOTEROL FUMARATE DIHYDRATE 2 PUFF: 200; 5 AEROSOL RESPIRATORY (INHALATION) at 20:20

## 2023-11-07 RX ADMIN — VANCOMYCIN HYDROCHLORIDE 500 MG: 250 CAPSULE ORAL at 14:12

## 2023-11-07 RX ADMIN — PANTOPRAZOLE SODIUM 40 MG: 40 TABLET, DELAYED RELEASE ORAL at 05:58

## 2023-11-07 RX ADMIN — TIOTROPIUM BROMIDE INHALATION SPRAY 2 PUFF: 3.12 SPRAY, METERED RESPIRATORY (INHALATION) at 07:22

## 2023-11-07 ASSESSMENT — ENCOUNTER SYMPTOMS
GASTROINTESTINAL NEGATIVE: 1
SHORTNESS OF BREATH: 0

## 2023-11-07 NOTE — DISCHARGE SUMMARY
Hospital Medicine Discharge Summary    Patient ID: Margarita Claudio      Patient's PCP: Daria Spencer MD    Admit Date: 11/2/2023     Discharge Date:   11/07/2023    Admitting Provider: Moise Dailey MD     Discharge Provider: Moise Dailey MD     Discharge Diagnoses: Active Hospital Problems    Diagnosis     Neutrophilia [D72.9]     C. difficile colitis [A04.72]     Abdominal pain [R10.9]     Urinary retention [R33.9]     Colitis [K52.9]     Leukocytosis [D72.829]     Dyspnea [R06.00]     Anticoagulated [Z79.01]     Acute kidney injury superimposed on CKD (HCC) [N17.9, N18.9]     Elevated troponin [R79.89]     Chronic diastolic heart failure (HCC) [I50.32]        The patient was seen and examined on day of discharge and this discharge summary is in conjunction with any daily progress note from day of discharge.     Hospital Course:   From HPI:\"78 y.o. female who presented to [facility] with nominal pain and shortness of breath along with abnormal labs, to note that patient very poor historian  at bedside helps with history taking, apparently patient is having abdominal pain up to 7 out of 10 intensity for the last 3 to 4 days associated with nausea and multiple episodes of vomiting without blood, poor appetite, generalized weakness, no obvious trigger no obvious context worsening also associated with generalized weakness and minimal encephalopathy, in ED found to have elevated white count elevated creatinine and a CT scan showed colitis patient being admitted for further management and treatment discussed with EDMD agree with plan\"      Colitis with normal lactic acid on admission, C. difficile tested positive, at this time patient was on Dificid Flagyl, and p.o. vancomycin, GI, ID, general surgery consulted white count still elevated at 39.9 discussed multiple times during the day with GI ID, GI consulted GI at [facility] and plan to transfer patient for fecal transplantation called

## 2023-11-07 NOTE — CONSULTS
Infectious Diseases   Consult Note        Admission Date: 11/2/2023  Hospital Day: Hospital Day: 4   Attending: Aaliyah Lopez MD  Date of service: 11/5/23     Reason for admission: Urinary retention [R33.9]  Colitis [K52.9]  MARK (acute kidney injury) ([address]) [N17.9]  Abdominal pain, unspecified abdominal location [R10.9]  Urinary tract infection without hematuria, site unspecified [N39.0]  Leukocytosis, unspecified type [D72.829]  Dyspnea, unspecified type [R06.00]    Chief complaint/ Reason for consult: Severe C. difficile diarrhea, acute kidney injury    Microbiology:        I have reviewed allavailable micro lab data and cultures    Blood culture (2/2) - collected on 11/2/2023: Negative  Stool C. difficile: Collected on 11/4/2023: Positive  Urine culture  - collected on 11/2/2023: Less than 10,000 CFU per mL of gram-negative rods      Antibiotics and immunizations:       Current antibiotics: All antibiotics and their doses were reviewed by me    Recent Abx Admin                     metronidazole (FLAGYL) 500 mg in 0.9% NaCl 100 mL IVPB premix (mg) 500 mg New Bag 11/05/23 1013     500 mg New Bag  0249     500 mg New Bag 11/04/23 1837    Fidaxomicin (DIFICID) tablet 200 mg (mg) 200 mg Given 11/05/23 0949     200 mg Given 11/04/23 2055    cefTRIAXone (ROCEPHIN) 2,000 mg in sodium chloride 0.9 % 50 mL IVPB (mini-bag) (mg) 2,000 mg New Bag 11/04/23 1801                      Immunization History: All immunization history was reviewed by me today.     Immunization History   Administered Date(s) Administered    COVID-19, PFIZER Bivalent, DO NOT Dilute, (age 12y+), IM, 30 mcg/0.3 mL 03/03/2023    COVID-19, PFIZER GRAY top, DO NOT Dilute, (age 15 y+), IM, 30 mcg/0.3 mL 05/18/2022    COVID-19, PFIZER PURPLE top, DILUTE for use, (age 15 y+), 30mcg/0.3mL 02/25/2021, 03/18/2021, 10/20/2021    Influenza Vaccine, unspecified formulation 01/31/2018    Influenza, FLUAD, (age 72 y+), Adjuvanted, 0.5mL 11/22/2022
PALLIATIVE MEDICINE CONSULTATION     Patient name:Janae Simmons   [facility]:[id number]    :1945  Room/Bed:J6I-5083/4116-01   LOS: 5 days         Date of consult:2023    Inpatient consult to Palliative Care  Consult performed by: CHRISTINE Rawls Do - FRANNY  Consult ordered by: Daniel Coy MD  Reason for consult: Goals of care        ASSESSMENT/RECOMMENDATIONS     69 y.o. female admitted with abdominal pain, nausea, and vomiting. Symptoms continue to improve. Symptom Management:  Abdominal pain -positive for C. Difficile and possible ileus. GI and general surgery following. Symptoms are improving, denies pain or diarrhea. Debility -PT and OT following, recommending return to SNF. Encouraged patient and  to continue therapy at this time. Goals of Care- See below. Patient/Family Goals of Care :    Patient is well-known to palliative care. Patient and  previously apprehensive to SNF, however have now had a good experience. Patient has been acknowledges the benefit, is unsure if patient will return. He feels as though patient may benefit from another week of therapy prior to returning home, but the end goal is again to return home. Question patient's benefit of long-term support, patient and  declined. Reviewed CODE STATUS, updated according to patient's wishes. Patient previously referred to outpatient palliative care, would benefit to resume services once home from SNF given her frequent hospitalizations. Disposition/Discharge Plan:   SNF    Advance Directives: The patient has appointed the following active healthcare agents:    Primary Decision Maker: Angela Padilla Spouse - [phone]    Secondary Decision Maker: Ghislaine Pradhan Child - [phone]    The Patient has the following current code status:    Code Status: Limited      Interactive exchange regarding medications,tests and procedures with: patient, floor RN.    Thank you for
Sarah:  I interviewed and examined the patient and reviewed the relevant lab and radiologic studies. Please see below note. I agree with Dianna Wynne's findings. Objective: In NAD. Abdomen obese soft nondistended and no focal tenderness. Impression:   Subacute abdominal pain nausea vomiting and diarrhea  Leukocytosis  Pyuria  COPD, oxygen requiring  CAD  Atrial fibrillation  Vascular disease/abdominal aortic aneurysm endovascular repair  Hypercoagulable state  Chronic renal insufficiency    Discussion: The findings on the noncontrast CT of the abdomen are underwhelming. To evaluate, we have ordered stool studies    Plan:  C. difficile toxin, fecal leukocytes      GASTROENTEROLOGY INPATIENT CONSULTATION        IDENTIFYING DATA/REASON FOR CONSULTATION   PATIENT:  Dyana Daley  MRN:  [id number]  ADMIT DATE: 11/2/2023  TIME OF EVALUATION: 11/3/2023 7:47 AM  HOSPITAL STAY:   LOS: 1 day     REASON FOR CONSULTATION:  Colitis    HISTORY OF PRESENT ILLNESS   Dyana Daley is a 69 y.o. female with a PMH of COPD on chronic oxygen, CAD s/p PCI, atrial fibrillation on Xarelto, DVT, PE, AAA with history of endovascular repair, CHF, hypertension, hyperlipidemia, CKD and obesity who presented on 11/2/2023 with abdominal pain, nausea, vomiting, weakness. CT scan showed long segment of mucosal thickening of the proximal colon suggestive of colitis and air-fluid levels and prominent loops of distal small bowel possibly representing ileus or enteritis. Blood work was notable for significantly elevated white count of 30 and acute on CKD. UA concerning for infection, cultures sent/pending. She has been started on rocephin and flagyl. We have been consulted regarding colitis. Patient is a poor historian. No family present at time of visit. Patient denies abdominal pain however abdomen tender with palpitation.   She was recently admitted to the hospital last month with COPD exacerbation and parotitis   she was
Surgery Consult Note     Warren Deshpande PA-C  Pt Name: Ondina Otto  MRN: [id number]  [facility] 1945  Date of evaluation: 11/3/2023  Primary Care Physician: Suraj Escamilla MD  Referred By: Wing Garcia  Reason for Consultation: ileus and colitis  Chief Complaint:abdominal pain  IMPRESSIONS:   Colitis  UTI  +nausea, emesis, and diarrhea   Leukocytosis: WBC count 29.8  --> 31.0  A. Fib on Xarelto  PLANS:   Monitor and control pain  IVF  Antibiotics  No general surgery needs at this time  Will follow along and give further recommendations as needed  SUBJECTIVE:   History of Chief Complaint:    Ondina Otto is a 69 y.o. female who presents with abdominal pain. The pain started 4 days ago. She admits to having associated nausea, emesis, and diarrhea. A CT scan was performed and that showed her to have a long segment of mucosal thickening of the proximal colon, air-fluid levels in prominent loops of distal small bowel. She thong ahving any other pain    Past Medical History  Reviewed  has a past medical history of AAA (abdominal aortic aneurysm) ([address]), AAA (abdominal aortic aneurysm) without rupture (HCC), Atrial fibrillation ([address]), CAD (coronary artery disease), CHF (congestive heart failure) ([address]), COPD (chronic obstructive pulmonary disease) ([address]), History of blood clots, Hyperlipidemia, and Hypertension. Past Surgical History  Reviewed has a past surgical history that includes Colonoscopy (09/02/2007); Hysterectomy (1990); Appendectomy (1990); bladder suspension; Cataract removal; Tonsillectomy (as a child); tumor excision; Cholecystectomy (10/15/2013); Colonoscopy (06/16/2017); joint replacement (12/2013); Abdominal aortic aneurysm repair; Cardiac surgery; Cardiac catheterization (Right, 11/28/2022); Upper gastrointestinal endoscopy (N/A, 4/26/2023); Colonoscopy (N/A, 5/10/2023); Capsule endoscopy (N/A, 5/22/2023);  Upper gastrointestinal endoscopy (N/A, 5/24/2023); and colectomy (N/A,
known CAD and multiple comorbidities including MARK on CKD who presents with probable colitis. No plans for stress testing or angiography. 2) Chronic diastolic heart failure. EF 60%. NYHA III at baseline. Clinically may be volume depleted with MARK on CKD. Continue attempts at blood pressure control. No SGLT2i with severity of kidney dysfunction. Currently on IV fluids but used judiciously. 3) MARK on CKD. Will defer management to primary team.    4) Paroxysmal atrial fibrillation. Currently in sinus rhythm. Chronic management per EP on an outpatient basis. Continue DOAC. 5) CAD. Continue medical management risk factor modification including the use of statin. Not on aspirin secondary to prior GI bleed and use of DOAC. Overall, the problems requiring hospitalization are high in severity. Will sign off. Call with questions. Will arrange outpatient follow-up. Thank you for allowing us to participate in the care of Yusuf Scotts. Chavez Bansal.  Dot Radha, [address]  11/3/2023 8:11 AM

## 2023-12-27 ENCOUNTER — TELEPHONE (OUTPATIENT)
Dept: FAMILY MEDICINE CLINIC | Age: 78
End: 2023-12-27

## 2023-12-27 NOTE — TELEPHONE ENCOUNTER
Nurse called and is with pt now and her blood pressure is 176/108 but no other symptoms. Please advise. Please call Zeina at [phone].

## 2024-01-04 ENCOUNTER — OFFICE VISIT (OUTPATIENT)
Dept: FAMILY MEDICINE CLINIC | Age: 79
End: 2024-01-04
Payer: MEDICARE

## 2024-01-04 VITALS
DIASTOLIC BLOOD PRESSURE: 62 MMHG | SYSTOLIC BLOOD PRESSURE: 130 MMHG | HEART RATE: 88 BPM | BODY MASS INDEX: 39.83 KG/M2 | TEMPERATURE: 97.5 F | HEIGHT: 63 IN

## 2024-01-04 DIAGNOSIS — I10 ESSENTIAL HYPERTENSION: Primary | ICD-10-CM

## 2024-01-04 DIAGNOSIS — Z79.4 TYPE 2 DIABETES MELLITUS WITH OTHER DIABETIC KIDNEY COMPLICATION, WITH LONG-TERM CURRENT USE OF INSULIN (HCC): ICD-10-CM

## 2024-01-04 DIAGNOSIS — I48.11 LONGSTANDING PERSISTENT ATRIAL FIBRILLATION (HCC): ICD-10-CM

## 2024-01-04 DIAGNOSIS — E11.29 TYPE 2 DIABETES MELLITUS WITH OTHER DIABETIC KIDNEY COMPLICATION, WITH LONG-TERM CURRENT USE OF INSULIN (HCC): ICD-10-CM

## 2024-01-04 PROCEDURE — G8399 PT W/DXA RESULTS DOCUMENT: HCPCS | Performed by: FAMILY MEDICINE

## 2024-01-04 PROCEDURE — G8417 CALC BMI ABV UP PARAM F/U: HCPCS | Performed by: FAMILY MEDICINE

## 2024-01-04 PROCEDURE — 1090F PRES/ABSN URINE INCON ASSESS: CPT | Performed by: FAMILY MEDICINE

## 2024-01-04 PROCEDURE — 1123F ACP DISCUSS/DSCN MKR DOCD: CPT | Performed by: FAMILY MEDICINE

## 2024-01-04 PROCEDURE — 1036F TOBACCO NON-USER: CPT | Performed by: FAMILY MEDICINE

## 2024-01-04 PROCEDURE — G8484 FLU IMMUNIZE NO ADMIN: HCPCS | Performed by: FAMILY MEDICINE

## 2024-01-04 PROCEDURE — 99214 OFFICE O/P EST MOD 30 MIN: CPT | Performed by: FAMILY MEDICINE

## 2024-01-04 PROCEDURE — 3078F DIAST BP <80 MM HG: CPT | Performed by: FAMILY MEDICINE

## 2024-01-04 PROCEDURE — G8427 DOCREV CUR MEDS BY ELIG CLIN: HCPCS | Performed by: FAMILY MEDICINE

## 2024-01-04 PROCEDURE — 3075F SYST BP GE 130 - 139MM HG: CPT | Performed by: FAMILY MEDICINE

## 2024-01-04 ASSESSMENT — ENCOUNTER SYMPTOMS
CONSTIPATION: 0
NAUSEA: 0
SHORTNESS OF BREATH: 0
VOMITING: 0
ABDOMINAL PAIN: 0
BLOOD IN STOOL: 0
DIARRHEA: 0

## 2024-01-04 ASSESSMENT — PATIENT HEALTH QUESTIONNAIRE - PHQ9
1. LITTLE INTEREST OR PLEASURE IN DOING THINGS: 0
SUM OF ALL RESPONSES TO PHQ QUESTIONS 1-9: 0
SUM OF ALL RESPONSES TO PHQ QUESTIONS 1-9: 0
SUM OF ALL RESPONSES TO PHQ9 QUESTIONS 1 & 2: 0
2. FEELING DOWN, DEPRESSED OR HOPELESS: 0
SUM OF ALL RESPONSES TO PHQ QUESTIONS 1-9: 0
SUM OF ALL RESPONSES TO PHQ QUESTIONS 1-9: 0

## 2024-01-04 NOTE — PROGRESS NOTES
RESPIMAT) 2.5 MCG/ACT AERS inhaler, Inhale 2 puffs into the lungs daily, Disp: 1 each, Rfl: 3  miconazole (MICOTIN) 2 % powder, Apply topically 2 times daily.Apply to abd folds., Disp: 45 g, Rfl: 1  dilTIAZem (CARDIZEM CD) 120 MG extended release capsule, Take 1 capsule by mouth daily, Disp: 30 capsule, Rfl: 3  ranolazine (RANEXA) 500 MG extended release tablet, Take 1 tablet by mouth 2 times daily, Disp: 60 tablet, Rfl: 11  ferrous sulfate (IRON 325) 325 (65 Fe) MG tablet, Take 1 tablet by mouth 2 times daily, Disp: 60 tablet, Rfl: 5  isosorbide mononitrate (IMDUR) 120 MG extended release tablet, Take 1 tablet by mouth daily, Disp: 30 tablet, Rfl: 3  clobetasol (TEMOVATE) 0.05 % cream, Apply topically daily as needed (rash), Disp: , Rfl:   fluticasone-umeclidin-vilant (TRELEGY ELLIPTA) 200-62.5-25 MCG/ACT AEPB inhaler, Inhale 1 puff into the lungs daily, Disp: 1 each, Rfl: 11  hydrALAZINE (APRESOLINE) 50 MG tablet, TAKE ONE TABLET BY MOUTH EVERY 8 HOURS, Disp: 270 tablet, Rfl: 1  albuterol (ACCUNEB) 0.63 MG/3ML nebulizer solution, Take 3 mLs by nebulization every 4 hours as needed for Wheezing, Disp: , Rfl:   vitamin C (ASCORBIC ACID) 500 MG tablet, Take 1 tablet by mouth daily, Disp: , Rfl:     No current facility-administered medications for this visit.      ---------------------------               01/04/24                      1434         ---------------------------   BP:          130/62         Site:    Left Upper Arm     Position:     Sitting        Cuff Size:   Large Adult      Pulse:         88           Temp:   97.5 °F (36.4 °C)   TempSrc:    Temporal        Height:   1.6 m (5' 3\")    ---------------------------  Body mass index is 39.83 kg/m².     Wt Readings from Last 3 Encounters:  11/06/23 : 102 kg (224 lb 13.9 oz)  09/19/23 : 108.9 kg (240 lb 1.3 oz)  09/17/23 : 108.7 kg (239 lb 10.2 oz)    BP Readings from Last 3 Encounters:  01/04/24 : 130/62  11/07/23 : (!)

## 2024-01-05 ENCOUNTER — TELEPHONE (OUTPATIENT)
Dept: FAMILY MEDICINE CLINIC | Age: 79
End: 2024-01-05

## 2024-01-05 RX ORDER — MAGNESIUM OXIDE 400 MG/1
400 TABLET ORAL
COMMUNITY
Start: 2023-11-15

## 2024-01-05 RX ORDER — POTASSIUM CHLORIDE 20 MEQ/1
40 TABLET, EXTENDED RELEASE ORAL DAILY
COMMUNITY
Start: 2023-11-16 | End: 2024-01-12

## 2024-01-05 RX ORDER — ATORVASTATIN CALCIUM 40 MG/1
40 TABLET, FILM COATED ORAL DAILY
COMMUNITY
Start: 2023-12-14 | End: 2024-01-22 | Stop reason: SDUPTHER

## 2024-01-05 RX ORDER — SACCHAROMYCES BOULARDII 250 MG
250 CAPSULE ORAL 2 TIMES DAILY
COMMUNITY
Start: 2023-11-15 | End: 2024-01-22 | Stop reason: SDUPTHER

## 2024-01-05 RX ORDER — RIVAROXABAN 15 MG/1
15 TABLET, FILM COATED ORAL
COMMUNITY
Start: 2023-12-14

## 2024-01-05 NOTE — TELEPHONE ENCOUNTER
Linda tried to have [facility] give him an updated list of the patients meds but they wouldn't do this. Linda wants to know if our office can call [facility] and have them fax it over. Linda wants this b/c that way Dr MACIAS has an accurate up to date list of the pt's meds.

## 2024-01-05 NOTE — TELEPHONE ENCOUNTER
Please review medications under medication review that are listed as taking.   Those medications she has prepackaged from pharmacy that she is taking everyday.

## 2024-01-05 NOTE — TELEPHONE ENCOUNTER
Spoke with [facility]- patient hasn't received anything from them in the past 5 months so they do not have an updated med list.

## 2024-01-06 DIAGNOSIS — E11.29 TYPE 2 DIABETES MELLITUS WITH OTHER DIABETIC KIDNEY COMPLICATION, WITH LONG-TERM CURRENT USE OF INSULIN (HCC): ICD-10-CM

## 2024-01-06 DIAGNOSIS — Z79.4 TYPE 2 DIABETES MELLITUS WITH OTHER DIABETIC KIDNEY COMPLICATION, WITH LONG-TERM CURRENT USE OF INSULIN (HCC): ICD-10-CM

## 2024-01-06 DIAGNOSIS — I10 ESSENTIAL HYPERTENSION: ICD-10-CM

## 2024-01-06 LAB
BACTERIA URNS QL MICRO: ABNORMAL /HPF
BILIRUB UR QL STRIP.AUTO: NEGATIVE
CLARITY UR: CLEAR
COLOR UR: YELLOW
EPI CELLS #/AREA URNS AUTO: 4 /HPF (ref 0–5)
GLUCOSE UR STRIP.AUTO-MCNC: NEGATIVE MG/DL
HGB UR QL STRIP.AUTO: NEGATIVE
HYALINE CASTS #/AREA URNS AUTO: 1 /LPF (ref 0–8)
KETONES UR STRIP.AUTO-MCNC: NEGATIVE MG/DL
LEUKOCYTE ESTERASE UR QL STRIP.AUTO: ABNORMAL
NITRITE UR QL STRIP.AUTO: NEGATIVE
PH UR STRIP.AUTO: 7 [PH] (ref 5–8)
PROT UR STRIP.AUTO-MCNC: NEGATIVE MG/DL
RBC CLUMPS #/AREA URNS AUTO: 1 /HPF (ref 0–4)
SP GR UR STRIP.AUTO: 1.01 (ref 1–1.03)
UA DIPSTICK W REFLEX MICRO PNL UR: YES
URN SPEC COLLECT METH UR: ABNORMAL
UROBILINOGEN UR STRIP-ACNC: 0.2 E.U./DL
WBC #/AREA URNS AUTO: 1 /HPF (ref 0–5)

## 2024-01-11 ENCOUNTER — TELEPHONE (OUTPATIENT)
Dept: FAMILY MEDICINE CLINIC | Age: 79
End: 2024-01-11

## 2024-01-11 DIAGNOSIS — I48.11 LONGSTANDING PERSISTENT ATRIAL FIBRILLATION (HCC): ICD-10-CM

## 2024-01-11 DIAGNOSIS — I10 ESSENTIAL HYPERTENSION: ICD-10-CM

## 2024-01-11 DIAGNOSIS — E11.29 TYPE 2 DIABETES MELLITUS WITH OTHER DIABETIC KIDNEY COMPLICATION, WITH LONG-TERM CURRENT USE OF INSULIN (HCC): ICD-10-CM

## 2024-01-11 DIAGNOSIS — Z79.4 TYPE 2 DIABETES MELLITUS WITH OTHER DIABETIC KIDNEY COMPLICATION, WITH LONG-TERM CURRENT USE OF INSULIN (HCC): ICD-10-CM

## 2024-01-11 DIAGNOSIS — I13.11 HYP HRT AND CHR KDNY DIS W/O HRT FAIL, W STG 5 CHR KDNY/ESRD (HCC): Primary | ICD-10-CM

## 2024-01-11 LAB
ANION GAP SERPL CALCULATED.3IONS-SCNC: 12 MMOL/L (ref 3–16)
BASOPHILS # BLD: 0.1 K/UL (ref 0–0.2)
BASOPHILS NFR BLD: 0.6 %
BUN SERPL-MCNC: 41 MG/DL (ref 7–20)
CALCIUM SERPL-MCNC: 9.2 MG/DL (ref 8.3–10.6)
CHLORIDE SERPL-SCNC: 86 MMOL/L (ref 99–110)
CO2 SERPL-SCNC: 37 MMOL/L (ref 21–32)
CREAT SERPL-MCNC: 1.4 MG/DL (ref 0.6–1.2)
DEPRECATED RDW RBC AUTO: 18.7 % (ref 12.4–15.4)
EOSINOPHIL # BLD: 0.4 K/UL (ref 0–0.6)
EOSINOPHIL NFR BLD: 2.7 %
GFR SERPLBLD CREATININE-BSD FMLA CKD-EPI: 39 ML/MIN/{1.73_M2}
GLUCOSE SERPL-MCNC: 109 MG/DL (ref 70–99)
HCT VFR BLD AUTO: 31.2 % (ref 36–48)
HGB BLD-MCNC: 10 G/DL (ref 12–16)
LYMPHOCYTES # BLD: 1.2 K/UL (ref 1–5.1)
LYMPHOCYTES NFR BLD: 8.6 %
MCH RBC QN AUTO: 29.4 PG (ref 26–34)
MCHC RBC AUTO-ENTMCNC: 32 G/DL (ref 31–36)
MCV RBC AUTO: 91.8 FL (ref 80–100)
MONOCYTES # BLD: 1 K/UL (ref 0–1.3)
MONOCYTES NFR BLD: 7.1 %
NEUTROPHILS # BLD: 11.1 K/UL (ref 1.7–7.7)
NEUTROPHILS NFR BLD: 81 %
PLATELET # BLD AUTO: 272 K/UL (ref 135–450)
PMV BLD AUTO: 9.8 FL (ref 5–10.5)
POTASSIUM SERPL-SCNC: 3.2 MMOL/L (ref 3.5–5.1)
RBC # BLD AUTO: 3.4 M/UL (ref 4–5.2)
SODIUM SERPL-SCNC: 135 MMOL/L (ref 136–145)
WBC # BLD AUTO: 13.7 K/UL (ref 4–11)

## 2024-01-11 RX ORDER — BUDESONIDE AND FORMOTEROL FUMARATE DIHYDRATE 80; 4.5 UG/1; UG/1
2 AEROSOL RESPIRATORY (INHALATION) 2 TIMES DAILY
Qty: 10.2 G | Refills: 3 | Status: SHIPPED | OUTPATIENT
Start: 2024-01-11

## 2024-01-11 NOTE — PROGRESS NOTES
in today for his visit and brought in med symbicort she was using at the NH and wanted to get refilled  We sent to [facility]    Orders Placed This Encounter   Medications    budesonide-formoterol (SYMBICORT) 80-4.5 MCG/ACT AERO     Sig: Inhale 2 puffs into the lungs 2 times daily     Dispense:  10.2 g     Refill:  3

## 2024-01-12 ENCOUNTER — TELEPHONE (OUTPATIENT)
Dept: FAMILY MEDICINE CLINIC | Age: 79
End: 2024-01-12

## 2024-01-12 DIAGNOSIS — E87.6 LOW BLOOD POTASSIUM: Primary | ICD-10-CM

## 2024-01-12 DIAGNOSIS — D64.9 ANEMIA, UNSPECIFIED TYPE: ICD-10-CM

## 2024-01-12 LAB
EST. AVERAGE GLUCOSE BLD GHB EST-MCNC: 108.3 MG/DL
HBA1C MFR BLD: 5.4 %

## 2024-01-12 RX ORDER — POTASSIUM CHLORIDE 20 MEQ/1
40 TABLET, EXTENDED RELEASE ORAL DAILY
Qty: 60 TABLET | Refills: 0
Start: 2024-01-12

## 2024-01-12 NOTE — TELEPHONE ENCOUNTER
----- Message from Quinn Villanueva sent at 1/12/2024  1:17 PM EST -----  Subject: Results Request    QUESTIONS  Results: LABS; Ordered by:   Date Performed: 2024-01-10  ---------------------------------------------------------------------------  --------------  CALL BACK INFO    [phone]; OK to leave message on voicemail  ---------------------------------------------------------------------------  --------------

## 2024-01-12 NOTE — TELEPHONE ENCOUNTER
Increase the potassium to three a day   Recheck in 10 days the labs    (We gave results to them already )     Diagnosis Orders   1. Low blood potassium  Basic Metabolic Panel      2. Anemia, unspecified type  Hemoglobin and Hematocrit

## 2024-01-18 ENCOUNTER — TELEPHONE (OUTPATIENT)
Dept: FAMILY MEDICINE CLINIC | Age: 79
End: 2024-01-18

## 2024-01-18 NOTE — TELEPHONE ENCOUNTER
Physical Therapist is with patient and the patient is not feeling well and she wants to make certain the medication list her and patient's family has is correct since patient just left a nursing home.    Please call [phone].

## 2024-01-19 DIAGNOSIS — E87.3 METABOLIC ALKALOSIS: ICD-10-CM

## 2024-01-19 DIAGNOSIS — E86.1 INTRAVASCULAR VOLUME DEPLETION: ICD-10-CM

## 2024-01-19 RX ORDER — ISOSORBIDE MONONITRATE 120 MG/1
120 TABLET, EXTENDED RELEASE ORAL DAILY
Qty: 30 TABLET | Refills: 3 | Status: SHIPPED | OUTPATIENT
Start: 2024-01-19

## 2024-01-19 RX ORDER — TORSEMIDE 20 MG/1
40 TABLET ORAL DAILY
Qty: 60 TABLET | Refills: 1
Start: 2024-01-19

## 2024-01-22 RX ORDER — ATORVASTATIN CALCIUM 40 MG/1
40 TABLET, FILM COATED ORAL DAILY
Qty: 30 TABLET | Refills: 3 | Status: SHIPPED | OUTPATIENT
Start: 2024-01-22

## 2024-01-22 RX ORDER — SACCHAROMYCES BOULARDII 250 MG
250 CAPSULE ORAL 2 TIMES DAILY
Qty: 60 CAPSULE | Refills: 2 | Status: SHIPPED | OUTPATIENT
Start: 2024-01-22

## 2024-01-22 RX ORDER — ISOSORBIDE MONONITRATE 120 MG/1
120 TABLET, EXTENDED RELEASE ORAL DAILY
Qty: 30 TABLET | Refills: 3 | Status: SHIPPED | OUTPATIENT
Start: 2024-01-22

## 2024-02-13 ENCOUNTER — APPOINTMENT (OUTPATIENT)
Dept: GENERAL RADIOLOGY | Age: 79
DRG: 291 | End: 2024-02-13
Payer: MEDICARE

## 2024-02-13 ENCOUNTER — HOSPITAL ENCOUNTER (INPATIENT)
Age: 79
LOS: 7 days | Discharge: HOME HEALTH | DRG: 291 | End: 2024-02-20
Attending: EMERGENCY MEDICINE | Admitting: INTERNAL MEDICINE
Payer: MEDICARE

## 2024-02-13 ENCOUNTER — TELEPHONE (OUTPATIENT)
Dept: FAMILY MEDICINE CLINIC | Age: 79
End: 2024-02-13

## 2024-02-13 DIAGNOSIS — E87.6 HYPOKALEMIA: ICD-10-CM

## 2024-02-13 DIAGNOSIS — J44.9 CHRONIC OBSTRUCTIVE PULMONARY DISEASE, UNSPECIFIED COPD TYPE (HCC): ICD-10-CM

## 2024-02-13 DIAGNOSIS — R94.31 ABNORMAL EKG: Primary | ICD-10-CM

## 2024-02-13 PROBLEM — I50.43 ACUTE ON CHRONIC COMBINED SYSTOLIC (CONGESTIVE) AND DIASTOLIC (CONGESTIVE) HEART FAILURE (HCC): Status: ACTIVE | Noted: 2024-02-13

## 2024-02-13 LAB
ALBUMIN SERPL-MCNC: 3.6 G/DL (ref 3.4–5)
ALBUMIN/GLOB SERPL: 1.1 {RATIO} (ref 1.1–2.2)
ALP SERPL-CCNC: 86 U/L (ref 40–129)
ALT SERPL-CCNC: 12 U/L (ref 10–40)
ANION GAP SERPL CALCULATED.3IONS-SCNC: 9 MMOL/L (ref 3–16)
AST SERPL-CCNC: 18 U/L (ref 15–37)
BASE EXCESS BLDV CALC-SCNC: 17.9 MMOL/L
BASOPHILS # BLD: 0 K/UL (ref 0–0.2)
BASOPHILS NFR BLD: 0.4 %
BILIRUB SERPL-MCNC: 0.4 MG/DL (ref 0–1)
BUN SERPL-MCNC: 35 MG/DL (ref 7–20)
CALCIUM SERPL-MCNC: 9.6 MG/DL (ref 8.3–10.6)
CHLORIDE SERPL-SCNC: 89 MMOL/L (ref 99–110)
CO2 BLDV-SCNC: 47 MMOL/L
CO2 SERPL-SCNC: 40 MMOL/L (ref 21–32)
COHGB MFR BLDV: 1.9 %
CREAT SERPL-MCNC: 1.3 MG/DL (ref 0.6–1.2)
DEPRECATED RDW RBC AUTO: 17.3 % (ref 12.4–15.4)
EKG ATRIAL RATE: 97 BPM
EKG DIAGNOSIS: NORMAL
EKG P AXIS: 79 DEGREES
EKG Q-T INTERVAL: 386 MS
EKG QRS DURATION: 92 MS
EKG QTC CALCULATION (BAZETT): 464 MS
EKG R AXIS: -23 DEGREES
EKG T AXIS: 18 DEGREES
EKG VENTRICULAR RATE: 87 BPM
EOSINOPHIL # BLD: 0.3 K/UL (ref 0–0.6)
EOSINOPHIL NFR BLD: 2.9 %
FLUAV RNA UPPER RESP QL NAA+PROBE: NEGATIVE
FLUBV AG NPH QL: NEGATIVE
GFR SERPLBLD CREATININE-BSD FMLA CKD-EPI: 42 ML/MIN/{1.73_M2}
GLUCOSE SERPL-MCNC: 122 MG/DL (ref 70–99)
HCO3 BLDV-SCNC: 45 MMOL/L (ref 23–29)
HCT VFR BLD AUTO: 29.6 % (ref 36–48)
HGB BLD-MCNC: 9.8 G/DL (ref 12–16)
IRON SATN MFR SERPL: 15 % (ref 15–50)
IRON SERPL-MCNC: 38 UG/DL (ref 37–145)
LYMPHOCYTES # BLD: 0.8 K/UL (ref 1–5.1)
LYMPHOCYTES NFR BLD: 7.1 %
MAGNESIUM SERPL-MCNC: 2.1 MG/DL (ref 1.8–2.4)
MCH RBC QN AUTO: 29.6 PG (ref 26–34)
MCHC RBC AUTO-ENTMCNC: 32.9 G/DL (ref 31–36)
MCV RBC AUTO: 90 FL (ref 80–100)
METHGB MFR BLDV: 0.8 %
MONOCYTES # BLD: 0.8 K/UL (ref 0–1.3)
MONOCYTES NFR BLD: 7.5 %
NEUTROPHILS # BLD: 9.1 K/UL (ref 1.7–7.7)
NEUTROPHILS NFR BLD: 82.1 %
NT-PROBNP SERPL-MCNC: 7269 PG/ML (ref 0–449)
O2 THERAPY: ABNORMAL
PCO2 BLDV: 66.2 MMHG (ref 40–50)
PH BLDV: 7.44 [PH] (ref 7.35–7.45)
PLATELET # BLD AUTO: 280 K/UL (ref 135–450)
PMV BLD AUTO: 8.9 FL (ref 5–10.5)
PO2 BLDV: 31 MMHG
POTASSIUM SERPL-SCNC: 3.2 MMOL/L (ref 3.5–5.1)
PROT SERPL-MCNC: 6.8 G/DL (ref 6.4–8.2)
RBC # BLD AUTO: 3.29 M/UL (ref 4–5.2)
SAO2 % BLDV: 52 %
SARS-COV-2 RDRP RESP QL NAA+PROBE: NOT DETECTED
SODIUM SERPL-SCNC: 138 MMOL/L (ref 136–145)
TIBC SERPL-MCNC: 252 UG/DL (ref 260–445)
TROPONIN, HIGH SENSITIVITY: 81 NG/L (ref 0–14)
TROPONIN, HIGH SENSITIVITY: 82 NG/L (ref 0–14)
WBC # BLD AUTO: 11.1 K/UL (ref 4–11)

## 2024-02-13 PROCEDURE — 2700000000 HC OXYGEN THERAPY PER DAY

## 2024-02-13 PROCEDURE — 82803 BLOOD GASES ANY COMBINATION: CPT

## 2024-02-13 PROCEDURE — 87635 SARS-COV-2 COVID-19 AMP PRB: CPT

## 2024-02-13 PROCEDURE — 96374 THER/PROPH/DIAG INJ IV PUSH: CPT

## 2024-02-13 PROCEDURE — 84484 ASSAY OF TROPONIN QUANT: CPT

## 2024-02-13 PROCEDURE — 6370000000 HC RX 637 (ALT 250 FOR IP): Performed by: INTERNAL MEDICINE

## 2024-02-13 PROCEDURE — 93005 ELECTROCARDIOGRAM TRACING: CPT | Performed by: EMERGENCY MEDICINE

## 2024-02-13 PROCEDURE — 2580000003 HC RX 258

## 2024-02-13 PROCEDURE — 83880 ASSAY OF NATRIURETIC PEPTIDE: CPT

## 2024-02-13 PROCEDURE — 96375 TX/PRO/DX INJ NEW DRUG ADDON: CPT

## 2024-02-13 PROCEDURE — 99285 EMERGENCY DEPT VISIT HI MDM: CPT

## 2024-02-13 PROCEDURE — 93010 ELECTROCARDIOGRAM REPORT: CPT | Performed by: INTERNAL MEDICINE

## 2024-02-13 PROCEDURE — 94761 N-INVAS EAR/PLS OXIMETRY MLT: CPT

## 2024-02-13 PROCEDURE — 85025 COMPLETE CBC W/AUTO DIFF WBC: CPT

## 2024-02-13 PROCEDURE — 83540 ASSAY OF IRON: CPT

## 2024-02-13 PROCEDURE — 6360000002 HC RX W HCPCS: Performed by: INTERNAL MEDICINE

## 2024-02-13 PROCEDURE — 2580000003 HC RX 258: Performed by: INTERNAL MEDICINE

## 2024-02-13 PROCEDURE — 6360000002 HC RX W HCPCS: Performed by: PHYSICIAN ASSISTANT

## 2024-02-13 PROCEDURE — 83550 IRON BINDING TEST: CPT

## 2024-02-13 PROCEDURE — 6370000000 HC RX 637 (ALT 250 FOR IP): Performed by: PHYSICIAN ASSISTANT

## 2024-02-13 PROCEDURE — 71045 X-RAY EXAM CHEST 1 VIEW: CPT

## 2024-02-13 PROCEDURE — 94640 AIRWAY INHALATION TREATMENT: CPT

## 2024-02-13 PROCEDURE — 87804 INFLUENZA ASSAY W/OPTIC: CPT

## 2024-02-13 PROCEDURE — 80053 COMPREHEN METABOLIC PANEL: CPT

## 2024-02-13 PROCEDURE — 2060000000 HC ICU INTERMEDIATE R&B

## 2024-02-13 PROCEDURE — 83735 ASSAY OF MAGNESIUM: CPT

## 2024-02-13 RX ORDER — MAGNESIUM SULFATE IN WATER 40 MG/ML
2000 INJECTION, SOLUTION INTRAVENOUS PRN
Status: DISCONTINUED | OUTPATIENT
Start: 2024-02-13 | End: 2024-02-20 | Stop reason: HOSPADM

## 2024-02-13 RX ORDER — ACETAMINOPHEN 325 MG/1
650 TABLET ORAL EVERY 6 HOURS PRN
Status: DISCONTINUED | OUTPATIENT
Start: 2024-02-13 | End: 2024-02-20 | Stop reason: HOSPADM

## 2024-02-13 RX ORDER — PREDNISONE 20 MG/1
40 TABLET ORAL DAILY
Status: COMPLETED | OUTPATIENT
Start: 2024-02-14 | End: 2024-02-18

## 2024-02-13 RX ORDER — IPRATROPIUM BROMIDE AND ALBUTEROL SULFATE 2.5; .5 MG/3ML; MG/3ML
1 SOLUTION RESPIRATORY (INHALATION)
Status: DISCONTINUED | OUTPATIENT
Start: 2024-02-13 | End: 2024-02-15

## 2024-02-13 RX ORDER — LANOLIN ALCOHOL/MO/W.PET/CERES
400 CREAM (GRAM) TOPICAL
Status: DISCONTINUED | OUTPATIENT
Start: 2024-02-14 | End: 2024-02-20 | Stop reason: HOSPADM

## 2024-02-13 RX ORDER — LACTOBACILLUS RHAMNOSUS GG 10B CELL
1 CAPSULE ORAL 2 TIMES DAILY
Status: DISCONTINUED | OUTPATIENT
Start: 2024-02-13 | End: 2024-02-20 | Stop reason: HOSPADM

## 2024-02-13 RX ORDER — ATORVASTATIN CALCIUM 40 MG/1
40 TABLET, FILM COATED ORAL DAILY
Status: DISCONTINUED | OUTPATIENT
Start: 2024-02-14 | End: 2024-02-20 | Stop reason: HOSPADM

## 2024-02-13 RX ORDER — LISINOPRIL 5 MG/1
5 TABLET ORAL DAILY
Status: DISCONTINUED | OUTPATIENT
Start: 2024-02-13 | End: 2024-02-20 | Stop reason: HOSPADM

## 2024-02-13 RX ORDER — ACETAMINOPHEN 650 MG/1
650 SUPPOSITORY RECTAL EVERY 6 HOURS PRN
Status: DISCONTINUED | OUTPATIENT
Start: 2024-02-13 | End: 2024-02-20 | Stop reason: HOSPADM

## 2024-02-13 RX ORDER — IPRATROPIUM BROMIDE AND ALBUTEROL SULFATE 2.5; .5 MG/3ML; MG/3ML
2 SOLUTION RESPIRATORY (INHALATION) ONCE
Status: COMPLETED | OUTPATIENT
Start: 2024-02-13 | End: 2024-02-13

## 2024-02-13 RX ORDER — ONDANSETRON 4 MG/1
4 TABLET, ORALLY DISINTEGRATING ORAL EVERY 8 HOURS PRN
Status: DISCONTINUED | OUTPATIENT
Start: 2024-02-13 | End: 2024-02-20 | Stop reason: HOSPADM

## 2024-02-13 RX ORDER — WATER 10 ML/10ML
INJECTION INTRAMUSCULAR; INTRAVENOUS; SUBCUTANEOUS
Status: COMPLETED
Start: 2024-02-13 | End: 2024-02-13

## 2024-02-13 RX ORDER — POTASSIUM CHLORIDE 7.45 MG/ML
10 INJECTION INTRAVENOUS PRN
Status: DISCONTINUED | OUTPATIENT
Start: 2024-02-13 | End: 2024-02-20 | Stop reason: HOSPADM

## 2024-02-13 RX ORDER — ONDANSETRON 2 MG/ML
4 INJECTION INTRAMUSCULAR; INTRAVENOUS EVERY 6 HOURS PRN
Status: DISCONTINUED | OUTPATIENT
Start: 2024-02-13 | End: 2024-02-20 | Stop reason: HOSPADM

## 2024-02-13 RX ORDER — SODIUM CHLORIDE 9 MG/ML
INJECTION, SOLUTION INTRAVENOUS PRN
Status: DISCONTINUED | OUTPATIENT
Start: 2024-02-13 | End: 2024-02-20 | Stop reason: HOSPADM

## 2024-02-13 RX ORDER — SODIUM CHLORIDE 0.9 % (FLUSH) 0.9 %
5-40 SYRINGE (ML) INJECTION PRN
Status: DISCONTINUED | OUTPATIENT
Start: 2024-02-13 | End: 2024-02-20 | Stop reason: HOSPADM

## 2024-02-13 RX ORDER — ISOSORBIDE MONONITRATE 60 MG/1
120 TABLET, EXTENDED RELEASE ORAL DAILY
Status: DISCONTINUED | OUTPATIENT
Start: 2024-02-14 | End: 2024-02-20 | Stop reason: HOSPADM

## 2024-02-13 RX ORDER — DILTIAZEM HYDROCHLORIDE 120 MG/1
120 CAPSULE, COATED, EXTENDED RELEASE ORAL DAILY
Status: DISCONTINUED | OUTPATIENT
Start: 2024-02-14 | End: 2024-02-20 | Stop reason: HOSPADM

## 2024-02-13 RX ORDER — CLONIDINE HYDROCHLORIDE 0.1 MG/1
0.1 TABLET ORAL 2 TIMES DAILY
Status: DISCONTINUED | OUTPATIENT
Start: 2024-02-13 | End: 2024-02-20

## 2024-02-13 RX ORDER — SODIUM CHLORIDE 0.9 % (FLUSH) 0.9 %
5-40 SYRINGE (ML) INJECTION EVERY 12 HOURS SCHEDULED
Status: DISCONTINUED | OUTPATIENT
Start: 2024-02-13 | End: 2024-02-20 | Stop reason: HOSPADM

## 2024-02-13 RX ORDER — FUROSEMIDE 10 MG/ML
40 INJECTION INTRAMUSCULAR; INTRAVENOUS 2 TIMES DAILY
Status: DISCONTINUED | OUTPATIENT
Start: 2024-02-13 | End: 2024-02-14

## 2024-02-13 RX ORDER — POTASSIUM CHLORIDE 20 MEQ/1
40 TABLET, EXTENDED RELEASE ORAL DAILY
Status: DISCONTINUED | OUTPATIENT
Start: 2024-02-13 | End: 2024-02-19

## 2024-02-13 RX ORDER — POTASSIUM CHLORIDE 7.45 MG/ML
10 INJECTION INTRAVENOUS ONCE
Status: COMPLETED | OUTPATIENT
Start: 2024-02-13 | End: 2024-02-13

## 2024-02-13 RX ORDER — IPRATROPIUM BROMIDE AND ALBUTEROL SULFATE 2.5; .5 MG/3ML; MG/3ML
1 SOLUTION RESPIRATORY (INHALATION) ONCE
Status: COMPLETED | OUTPATIENT
Start: 2024-02-13 | End: 2024-02-13

## 2024-02-13 RX ORDER — POLYETHYLENE GLYCOL 3350 17 G/17G
17 POWDER, FOR SOLUTION ORAL DAILY PRN
Status: DISCONTINUED | OUTPATIENT
Start: 2024-02-13 | End: 2024-02-20 | Stop reason: HOSPADM

## 2024-02-13 RX ORDER — POTASSIUM CHLORIDE 20 MEQ/1
40 TABLET, EXTENDED RELEASE ORAL PRN
Status: DISCONTINUED | OUTPATIENT
Start: 2024-02-13 | End: 2024-02-20 | Stop reason: HOSPADM

## 2024-02-13 RX ORDER — METHYLPREDNISOLONE SODIUM SUCCINATE 125 MG/2ML
125 INJECTION, POWDER, LYOPHILIZED, FOR SOLUTION INTRAMUSCULAR; INTRAVENOUS ONCE
Status: COMPLETED | OUTPATIENT
Start: 2024-02-13 | End: 2024-02-13

## 2024-02-13 RX ADMIN — MOMETASONE FUROATE AND FORMOTEROL FUMARATE DIHYDRATE 2 PUFF: 200; 5 AEROSOL RESPIRATORY (INHALATION) at 20:59

## 2024-02-13 RX ADMIN — CLONIDINE HYDROCHLORIDE 0.1 MG: 0.1 TABLET ORAL at 23:15

## 2024-02-13 RX ADMIN — Medication 10 ML: at 23:14

## 2024-02-13 RX ADMIN — POTASSIUM CHLORIDE 10 MEQ: 7.46 INJECTION, SOLUTION INTRAVENOUS at 16:38

## 2024-02-13 RX ADMIN — FUROSEMIDE 40 MG: 10 INJECTION, SOLUTION INTRAMUSCULAR; INTRAVENOUS at 23:17

## 2024-02-13 RX ADMIN — IPRATROPIUM BROMIDE AND ALBUTEROL SULFATE 1 DOSE: 2.5; .5 SOLUTION RESPIRATORY (INHALATION) at 20:59

## 2024-02-13 RX ADMIN — LISINOPRIL 5 MG: 5 TABLET ORAL at 23:30

## 2024-02-13 RX ADMIN — POTASSIUM CHLORIDE 40 MEQ: 1500 TABLET, EXTENDED RELEASE ORAL at 23:15

## 2024-02-13 RX ADMIN — Medication 1 CAPSULE: at 23:15

## 2024-02-13 RX ADMIN — METHYLPREDNISOLONE SODIUM SUCCINATE 125 MG: 125 INJECTION INTRAMUSCULAR; INTRAVENOUS at 16:33

## 2024-02-13 RX ADMIN — IPRATROPIUM BROMIDE AND ALBUTEROL SULFATE 1 DOSE: 2.5; .5 SOLUTION RESPIRATORY (INHALATION) at 16:50

## 2024-02-13 RX ADMIN — IPRATROPIUM BROMIDE AND ALBUTEROL SULFATE 2 DOSE: 2.5; .5 SOLUTION RESPIRATORY (INHALATION) at 12:34

## 2024-02-13 RX ADMIN — WATER 2 ML: 1 INJECTION INTRAMUSCULAR; INTRAVENOUS; SUBCUTANEOUS at 16:33

## 2024-02-13 ASSESSMENT — PAIN - FUNCTIONAL ASSESSMENT: PAIN_FUNCTIONAL_ASSESSMENT: 0-10

## 2024-02-13 ASSESSMENT — PAIN SCALES - GENERAL
PAINLEVEL_OUTOF10: 0
PAINLEVEL_OUTOF10: 0

## 2024-02-13 NOTE — ED NOTES
Pt picks at her skin creating bleeding wounds.  Left shoulder was picked and bleeding, bandaid applied.  Pt also seen picking at her face.

## 2024-02-13 NOTE — TELEPHONE ENCOUNTER
Irasema with [facility] calling to let Dr Dhillon know pt has 4+ Edema bilaterally has wheezing and crackling complains of SOB and has gained 6 pounds in a week. They are taking her to the hospital    Fwafq-[phone]

## 2024-02-13 NOTE — ED PROVIDER NOTES
[facility] EMERGENCY DEPARTMENT     EMERGENCY DEPARTMENT ENCOUNTER     Location: [facility] EMERGENCY DEPARTMENT  2/13/2024  Note Started: 1:24 PM EST 2/13/24      Patient Identification  Janae Perez is a 79 y.o. female      HPI:Janae Perez was evaluated in the Emergency Department for increased difficulty breathing over the last week.  She does have a cough.  She is not having chest pain.  She is currently wearing 3 L of oxygen.  She has some leg swelling which is chronic, she and her  are not really sure if it is worse than usual.. Although initial history and physical exam information was obtained by LIS/NPP/MD/ (who also dictated a record of this visit), I personally saw the patient and performed and made/approved the management plan and take responsibility for the patient management.      PHYSICAL EXAM:  General: Alert morbidly obese white female who appears dyspneic.  Heart: Regular rate and rhythm without murmur or gallop.  Breath sounds are diffusely decreased.  She has mild expiratory wheezing.  No rales or rhonchi.  Abdomen: Obese, soft, nontender.  Musculoskeletal: 4+ pitting edema bilateral lower extremities below the knees.  The edema includes the ankle and feet.  Skin: Warm and dry, good turgor.    EKG Interpretation  Sinus rhythm with A-V dissociation, accelerated junctional rhythm.  Age-indeterminate septal infarct.  Rhythm strip shows a sinus rhythm with A-V dissociation and accelerated junctional rhythm.  QRS 92 ms with no other ectopy.  Compared to 11/2/2023 the A-V dissociation/accelerated junctional rhythm is new.  No ST segment depression and leads III aVL and lead I    Labs Reviewed   TROPONIN - Abnormal; Notable for the following components:       Result Value    Troponin, High Sensitivity 81 (*)     All other components within normal limits   TROPONIN - Abnormal; Notable for the following components:    Troponin, High Sensitivity 82 (*)     
is NOT to be included for SEP-1 Core Measure due to:  Infection is not suspected    Patient oxygenating adequately on 3 L of oxygen by nasal cannula, but her breathing required a lot of effort and she was tachypneic.  No tachycardia, and her blood pressure was slightly above normal range.  She was given breathing treatments in the ED, reported only a little improvement.  Chest x-ray showed no acute findings.  EKG was notable for likely A-V dissociation that had not been seen previously, and some ST depressions in the inferior leads.  Creatinine baseline, troponin baseline, BNP somewhat elevated at 7269.  Potassium was somewhat low at 3.2, so she was given potassium replacement before any diuresis.  She would benefit from hospital admission, likely for CHF and COPD exacerbation, and for her abnormal EKG.  I consulted the hospitalist, who agreed to accept and admit the patient.  The patient verbalized understanding and agreement with this plan of care.     CRITICAL CARE TIME   There was a high probability of life-threatening clinical deterioration in the patient's condition requiring my urgent intervention.  I personally saw the patient and independently provided 35 minutes of non-concurrent critical care out of the total shared critical care time provided, excluding separately reportable procedures.       FINAL IMPRESSION      1. Abnormal EKG    2. Chronic obstructive pulmonary disease, unspecified COPD type (HCC)    3. Hypokalemia          DISPOSITION/PLAN   DISPOSITION Admitted 02/13/2024 05:21:54 PM      PATIENT REFERRED TO:  No follow-up provider specified.    DISCHARGE MEDICATIONS:  New Prescriptions    No medications on file       DISCONTINUED MEDICATIONS:  Discontinued Medications    No medications on file            (Please note that portions of this note were completed with a voice recognition program.  Efforts were made to edit the dictations but occasionally words are mis-transcribed.)    GITA Al

## 2024-02-13 NOTE — CARE COORDINATION
[facility]  Patient is active with [facility] for nurse, PT.    Will follow for discharge to home with orders to resume care.      Ray Duggan RN, BSN CTN  [facility] [phone]

## 2024-02-14 LAB
ANION GAP SERPL CALCULATED.3IONS-SCNC: 10 MMOL/L (ref 3–16)
BASOPHILS # BLD: 0 K/UL (ref 0–0.2)
BASOPHILS NFR BLD: 0.3 %
BUN SERPL-MCNC: 34 MG/DL (ref 7–20)
CALCIUM SERPL-MCNC: 9.1 MG/DL (ref 8.3–10.6)
CHLORIDE SERPL-SCNC: 91 MMOL/L (ref 99–110)
CHOLEST SERPL-MCNC: 137 MG/DL (ref 0–199)
CO2 SERPL-SCNC: 37 MMOL/L (ref 21–32)
CREAT SERPL-MCNC: 1.6 MG/DL (ref 0.6–1.2)
CREAT UR-MCNC: 42.1 MG/DL (ref 28–259)
DEPRECATED RDW RBC AUTO: 17.3 % (ref 12.4–15.4)
EOSINOPHIL # BLD: 0 K/UL (ref 0–0.6)
EOSINOPHIL NFR BLD: 0 %
GFR SERPLBLD CREATININE-BSD FMLA CKD-EPI: 33 ML/MIN/{1.73_M2}
GLUCOSE SERPL-MCNC: 151 MG/DL (ref 70–99)
HCT VFR BLD AUTO: 27.6 % (ref 36–48)
HDLC SERPL-MCNC: 62 MG/DL (ref 40–60)
HGB BLD-MCNC: 9.2 G/DL (ref 12–16)
LDLC SERPL CALC-MCNC: 65 MG/DL
LYMPHOCYTES # BLD: 0.5 K/UL (ref 1–5.1)
LYMPHOCYTES NFR BLD: 5.2 %
MAGNESIUM SERPL-MCNC: 1.9 MG/DL (ref 1.8–2.4)
MCH RBC QN AUTO: 29.8 PG (ref 26–34)
MCHC RBC AUTO-ENTMCNC: 33.5 G/DL (ref 31–36)
MCV RBC AUTO: 89.1 FL (ref 80–100)
MICROALBUMIN UR DL<=1MG/L-MCNC: 6.6 MG/DL
MICROALBUMIN/CREAT UR: 156.8 MG/G (ref 0–30)
MONOCYTES # BLD: 0.2 K/UL (ref 0–1.3)
MONOCYTES NFR BLD: 2.4 %
NEUTROPHILS # BLD: 9.1 K/UL (ref 1.7–7.7)
NEUTROPHILS NFR BLD: 92.1 %
PLATELET # BLD AUTO: 262 K/UL (ref 135–450)
PMV BLD AUTO: 9.2 FL (ref 5–10.5)
POTASSIUM SERPL-SCNC: 3.8 MMOL/L (ref 3.5–5.1)
RBC # BLD AUTO: 3.1 M/UL (ref 4–5.2)
SODIUM SERPL-SCNC: 138 MMOL/L (ref 136–145)
TRIGL SERPL-MCNC: 48 MG/DL (ref 0–150)
VLDLC SERPL CALC-MCNC: 10 MG/DL
WBC # BLD AUTO: 9.8 K/UL (ref 4–11)

## 2024-02-14 PROCEDURE — 2580000003 HC RX 258: Performed by: INTERNAL MEDICINE

## 2024-02-14 PROCEDURE — 6370000000 HC RX 637 (ALT 250 FOR IP): Performed by: INTERNAL MEDICINE

## 2024-02-14 PROCEDURE — 94760 N-INVAS EAR/PLS OXIMETRY 1: CPT

## 2024-02-14 PROCEDURE — 87040 BLOOD CULTURE FOR BACTERIA: CPT

## 2024-02-14 PROCEDURE — 82043 UR ALBUMIN QUANTITATIVE: CPT

## 2024-02-14 PROCEDURE — 82570 ASSAY OF URINE CREATININE: CPT

## 2024-02-14 PROCEDURE — 94640 AIRWAY INHALATION TREATMENT: CPT

## 2024-02-14 PROCEDURE — 36415 COLL VENOUS BLD VENIPUNCTURE: CPT

## 2024-02-14 PROCEDURE — 6360000002 HC RX W HCPCS: Performed by: INTERNAL MEDICINE

## 2024-02-14 PROCEDURE — 80061 LIPID PANEL: CPT

## 2024-02-14 PROCEDURE — 2700000000 HC OXYGEN THERAPY PER DAY

## 2024-02-14 PROCEDURE — 83735 ASSAY OF MAGNESIUM: CPT

## 2024-02-14 PROCEDURE — 2060000000 HC ICU INTERMEDIATE R&B

## 2024-02-14 PROCEDURE — 93308 TTE F-UP OR LMTD: CPT

## 2024-02-14 PROCEDURE — 85025 COMPLETE CBC W/AUTO DIFF WBC: CPT

## 2024-02-14 PROCEDURE — 80048 BASIC METABOLIC PNL TOTAL CA: CPT

## 2024-02-14 RX ORDER — FUROSEMIDE 10 MG/ML
40 INJECTION INTRAMUSCULAR; INTRAVENOUS DAILY
Status: DISCONTINUED | OUTPATIENT
Start: 2024-02-14 | End: 2024-02-14

## 2024-02-14 RX ORDER — FUROSEMIDE 10 MG/ML
60 INJECTION INTRAMUSCULAR; INTRAVENOUS DAILY
Status: DISCONTINUED | OUTPATIENT
Start: 2024-02-14 | End: 2024-02-14

## 2024-02-14 RX ORDER — LORAZEPAM 0.5 MG/1
0.25 TABLET ORAL 2 TIMES DAILY PRN
Status: DISPENSED | OUTPATIENT
Start: 2024-02-14 | End: 2024-02-15

## 2024-02-14 RX ADMIN — IPRATROPIUM BROMIDE AND ALBUTEROL SULFATE 1 DOSE: 2.5; .5 SOLUTION RESPIRATORY (INHALATION) at 20:24

## 2024-02-14 RX ADMIN — FUROSEMIDE 60 MG: 10 INJECTION, SOLUTION INTRAMUSCULAR; INTRAVENOUS at 10:34

## 2024-02-14 RX ADMIN — LORAZEPAM 0.25 MG: 0.5 TABLET ORAL at 17:40

## 2024-02-14 RX ADMIN — PREDNISONE 40 MG: 20 TABLET ORAL at 10:27

## 2024-02-14 RX ADMIN — RIVAROXABAN 15 MG: 15 TABLET, FILM COATED ORAL at 10:27

## 2024-02-14 RX ADMIN — POTASSIUM CHLORIDE 40 MEQ: 1500 TABLET, EXTENDED RELEASE ORAL at 10:27

## 2024-02-14 RX ADMIN — Medication 1 CAPSULE: at 21:16

## 2024-02-14 RX ADMIN — MOMETASONE FUROATE AND FORMOTEROL FUMARATE DIHYDRATE 2 PUFF: 200; 5 AEROSOL RESPIRATORY (INHALATION) at 07:14

## 2024-02-14 RX ADMIN — ATORVASTATIN CALCIUM 40 MG: 40 TABLET, FILM COATED ORAL at 10:27

## 2024-02-14 RX ADMIN — FUROSEMIDE 4 MG/HR: 10 INJECTION, SOLUTION INTRAMUSCULAR; INTRAVENOUS at 15:04

## 2024-02-14 RX ADMIN — IPRATROPIUM BROMIDE AND ALBUTEROL SULFATE 1 DOSE: 2.5; .5 SOLUTION RESPIRATORY (INHALATION) at 15:55

## 2024-02-14 RX ADMIN — Medication 10 ML: at 10:34

## 2024-02-14 RX ADMIN — MOMETASONE FUROATE AND FORMOTEROL FUMARATE DIHYDRATE 2 PUFF: 200; 5 AEROSOL RESPIRATORY (INHALATION) at 20:24

## 2024-02-14 RX ADMIN — ISOSORBIDE MONONITRATE 120 MG: 60 TABLET, EXTENDED RELEASE ORAL at 10:26

## 2024-02-14 RX ADMIN — Medication 10 ML: at 21:18

## 2024-02-14 RX ADMIN — IPRATROPIUM BROMIDE AND ALBUTEROL SULFATE 1 DOSE: 2.5; .5 SOLUTION RESPIRATORY (INHALATION) at 07:14

## 2024-02-14 RX ADMIN — CLONIDINE HYDROCHLORIDE 0.1 MG: 0.1 TABLET ORAL at 21:16

## 2024-02-14 RX ADMIN — Medication 1 CAPSULE: at 10:27

## 2024-02-14 RX ADMIN — IPRATROPIUM BROMIDE AND ALBUTEROL SULFATE 1 DOSE: 2.5; .5 SOLUTION RESPIRATORY (INHALATION) at 11:42

## 2024-02-14 RX ADMIN — DILTIAZEM HYDROCHLORIDE 120 MG: 120 CAPSULE, EXTENDED RELEASE ORAL at 10:26

## 2024-02-14 RX ADMIN — CLONIDINE HYDROCHLORIDE 0.1 MG: 0.1 TABLET ORAL at 10:31

## 2024-02-14 RX ADMIN — Medication 400 MG: at 12:51

## 2024-02-14 ASSESSMENT — PAIN SCALES - GENERAL
PAINLEVEL_OUTOF10: 0

## 2024-02-14 NOTE — DISCHARGE INSTR - COC
Continuity of Care Form    Patient Name: Janae Perez   :  1945  MRN:  [id number]    Admit date:  2024  Discharge date:  ***    Code Status Order: DNR-CCA   Advance Directives:     Admitting Physician:  Ward Davey MD  PCP: Boubacar Dhillon MD    Discharging Nurse: ***  Discharging Hospital Unit/Room#: W8A-0983/5119-01  Discharging Unit Phone Number: ***    Emergency Contact:   Extended Emergency Contact Information  Primary Emergency Contact: Linda Perez  Address: [address] [place] [address]  Home Phone: [phone]  Mobile Phone: [phone]  Relation: Spouse   needed? No  Secondary Emergency Contact: Anastacio Perez   [facility]  Home Phone: [phone]  Mobile Phone: [phone]  Relation: Child    Past Surgical History:  Past Surgical History:   Procedure Laterality Date    ABDOMINAL AORTIC ANEURYSM REPAIR      Endovascular abdominal AA    APPENDECTOMY      incidental    BLADDER SUSPENSION      CAPSULE ENDOSCOPY N/A 2023    ESOPHAGEAL CAPSULE ENDOSCOPY performed by Mishel De Anda MD at [facility] ENDOSCOPY    CARDIAC CATHETERIZATION Right 2022    Cardiomems PA sensor device implant Left PA    CARDIAC SURGERY      CATARACT REMOVAL      CHOLECYSTECTOMY  10/15/2013    COLECTOMY N/A 2023    LAPAROSCOPIC ASSISTED LYSIS OF ADHESIONS AND PUSH ENTEROSCOPY performed by Lokesh Fraser MD at [facility] OR    COLONOSCOPY  2007    dr park and check in 5 years.     COLONOSCOPY  2017    ok dr arndt, repeat 5 years    COLONOSCOPY N/A 5/10/2023    COLONOSCOPY performed by Shade Daily MD at [facility] ENDOSCOPY    HYSTERECTOMY (CERVIX STATUS UNKNOWN)      for benign tumor. just the uterus    JOINT REPLACEMENT  2013    right knee replacement    TONSILLECTOMY  as a child    TUMOR EXCISION      benign behind right ear about     UPPER GASTROINTESTINAL ENDOSCOPY N/A 2023    EGD CONTROL HEMORRHAGE WITH

## 2024-02-14 NOTE — ACP (ADVANCE CARE PLANNING)
Advance Care Planning     Advance Care Planning Activator (Inpatient)  Conversation Note      Date of ACP Conversation: 2/14/2024     Conversation Conducted with: Patient with Decision Making Capacity    ACP Activator: Belia Santana RN    Health Care Decision Maker:     Current Designated Health Care Decision Maker:     Primary Decision Maker: Linda Perez D - Spouse - [phone]    Secondary Decision Maker: Anastacio Perez - [phone]    Today we documented Decision Maker(s) consistent with ACP documents on file.    Care Preferences    Ventilation:  \"If you were in your present state of health and suddenly became very ill and were unable to breathe on your own, what would your preference be about the use of a ventilator (breathing machine) if it were available to you?\"      Would the patient desire the use of ventilator (breathing machine)?: no    \"If your health worsens and it becomes clear that your chance of recovery is unlikely, what would your preference be about the use of a ventilator (breathing machine) if it were available to you?\"     Would the patient desire the use of ventilator (breathing machine)?: No      Resuscitation  \"CPR works best to restart the heart when there is a sudden event, like a heart attack, in someone who is otherwise healthy. Unfortunately, CPR does not typically restart the heart for people who have serious health conditions or who are very sick.\"    \"In the event your heart stopped as a result of an underlying serious health condition, would you want attempts to be made to restart your heart (answer \"yes\" for attempt to resuscitate) or would you prefer a natural death (answer \"no\" for do not attempt to resuscitate)?\" no       [] Yes   [] No   Educated Patient / Decision Maker regarding differences between Advance Directives and portable DNR orders.    Length of ACP Conversation in minutes:      Conversation Outcomes:  Existing advance directive reviewed with patient; no

## 2024-02-14 NOTE — CARE COORDINATION
Case Management Assessment  Initial Evaluation    Date/Time of Evaluation: 2/14/2024 10:21 AM  Assessment Completed by: Belia Santana RN    If patient is discharged prior to next notation, then this note serves as note for discharge by case management.    Patient Name: Janae Perez                   YOB: 1945  Diagnosis: Hypokalemia [E87.6]  Abnormal EKG [R94.31]  Acute on chronic combined systolic (congestive) and diastolic (congestive) heart failure (HCC) [I50.43]  Chronic obstructive pulmonary disease, unspecified COPD type (HCC) [J44.9]                   Date / Time: 2/13/2024 11:36 AM    Patient Admission Status: Inpatient   Readmission Risk (Low < 19, Mod (19-27), High > 27): Readmission Risk Score: 28.8    Current PCP: Boubacar Dhillon MD  PCP verified by ? Yes    Chart Reviewed: Yes      History Provided by: Patient  Patient Orientation: Alert and Oriented    Patient Cognition: Alert    Hospitalization in the last 30 days (Readmission):  No    If yes, Readmission Assessment in  Navigator will be completed.    Advance Directives:      Code Status: DNR-CCA   Patient's Primary Decision Maker is: Legal Next of Kin    Primary Decision Maker: Linda Perez D - Spouse - [phone]    Secondary Decision Maker: Anastacio Perez Child - [phone]    Discharge Planning:    Patient lives with: Spouse/Significant Other Type of Home: Trailer/Mobile Home  Primary Care Giver: Family  Patient Support Systems include: Spouse/Significant Other, Family Members   Current Financial resources: Medicare  Current community resources: ECF/Home Care  Current services prior to admission: Durable Medical Equipment, Oxygen Therapy, Home Care            Current DME: Wheelchair, Walker, Oxygen Therapy (Comment), Home Aerosol            Type of Home Care services:  PT, OT, Nursing Services    ADLS  Prior functional level: Assistance with the following:, Bathing, Dressing, Toileting, Cooking, Housework, Shopping,

## 2024-02-14 NOTE — DISCHARGE INSTRUCTIONS
Extra Heart Failure sites:     https://Lottay.com/publication/?d=436322   --- this is American Heart Association interactive Healthier Living with Heart Failure guidebook.  Please click hyperlink or copy / paste link into search bar. Use your mouse to scroll through the pages.  Lots of information about weight monitoring, diet tips, activity, meds, etc    HF Baylis maxwell  -- this is a free smart phone maxwell available for iPhone and Android download.  Use your phone to track sodium / fluid intake, zone tool symptom tracking, weights, medications, etc. Click on this hyperlink  HF Baylis Maxwell   for QR code for easy download.      DASH (Dietary Approach to Stop Hypertension) diet --  https://www.nhlbi.nih.gov/education/dash-eating-plan -- this diet is a flexible eating plan that promotes heart healthy eating style.  Click on hyperlink or copy / paste link into search bar.  Lots of low sodium recipes and tips.    https://www.[employer].LabNow/recipes  -- more free recipes

## 2024-02-15 LAB
ALBUMIN SERPL-MCNC: 3.7 G/DL (ref 3.4–5)
ANION GAP SERPL CALCULATED.3IONS-SCNC: 5 MMOL/L (ref 3–16)
BUN SERPL-MCNC: 46 MG/DL (ref 7–20)
CALCIUM SERPL-MCNC: 9.1 MG/DL (ref 8.3–10.6)
CHLORIDE SERPL-SCNC: 95 MMOL/L (ref 99–110)
CO2 SERPL-SCNC: 41 MMOL/L (ref 21–32)
CREAT SERPL-MCNC: 1.8 MG/DL (ref 0.6–1.2)
DEPRECATED RDW RBC AUTO: 17.9 % (ref 12.4–15.4)
FERRITIN SERPL IA-MCNC: 102.8 NG/ML (ref 15–150)
FOLATE SERPL-MCNC: >20 NG/ML (ref 4.78–24.2)
GFR SERPLBLD CREATININE-BSD FMLA CKD-EPI: 28 ML/MIN/{1.73_M2}
GLUCOSE SERPL-MCNC: 126 MG/DL (ref 70–99)
HCT VFR BLD AUTO: 26.7 % (ref 36–48)
HGB BLD-MCNC: 8.9 G/DL (ref 12–16)
LACTATE BLDV-SCNC: 1.4 MMOL/L (ref 0.4–1.9)
MAGNESIUM SERPL-MCNC: 2.1 MG/DL (ref 1.8–2.4)
MCH RBC QN AUTO: 29.7 PG (ref 26–34)
MCHC RBC AUTO-ENTMCNC: 33.3 G/DL (ref 31–36)
MCV RBC AUTO: 89.4 FL (ref 80–100)
PHOSPHATE SERPL-MCNC: 3.7 MG/DL (ref 2.5–4.9)
PLATELET # BLD AUTO: 251 K/UL (ref 135–450)
PMV BLD AUTO: 8.6 FL (ref 5–10.5)
POTASSIUM SERPL-SCNC: 3.8 MMOL/L (ref 3.5–5.1)
RBC # BLD AUTO: 2.99 M/UL (ref 4–5.2)
SODIUM SERPL-SCNC: 141 MMOL/L (ref 136–145)
TRANSFERRIN SERPL-MCNC: 200 MG/DL (ref 200–360)
URATE SERPL-MCNC: 13.3 MG/DL (ref 2.6–6)
VIT B12 SERPL-MCNC: 1125 PG/ML (ref 211–911)
WBC # BLD AUTO: 13.3 K/UL (ref 4–11)

## 2024-02-15 PROCEDURE — 80069 RENAL FUNCTION PANEL: CPT

## 2024-02-15 PROCEDURE — 6370000000 HC RX 637 (ALT 250 FOR IP): Performed by: INTERNAL MEDICINE

## 2024-02-15 PROCEDURE — 84550 ASSAY OF BLOOD/URIC ACID: CPT

## 2024-02-15 PROCEDURE — 94760 N-INVAS EAR/PLS OXIMETRY 1: CPT

## 2024-02-15 PROCEDURE — 99233 SBSQ HOSP IP/OBS HIGH 50: CPT | Performed by: NURSE PRACTITIONER

## 2024-02-15 PROCEDURE — 2060000000 HC ICU INTERMEDIATE R&B

## 2024-02-15 PROCEDURE — 2580000003 HC RX 258: Performed by: INTERNAL MEDICINE

## 2024-02-15 PROCEDURE — 2700000000 HC OXYGEN THERAPY PER DAY

## 2024-02-15 PROCEDURE — 94640 AIRWAY INHALATION TREATMENT: CPT

## 2024-02-15 PROCEDURE — 82607 VITAMIN B-12: CPT

## 2024-02-15 PROCEDURE — 85027 COMPLETE CBC AUTOMATED: CPT

## 2024-02-15 PROCEDURE — 6360000002 HC RX W HCPCS

## 2024-02-15 PROCEDURE — 36415 COLL VENOUS BLD VENIPUNCTURE: CPT

## 2024-02-15 PROCEDURE — 97530 THERAPEUTIC ACTIVITIES: CPT | Performed by: PHYSICAL THERAPIST

## 2024-02-15 PROCEDURE — 83605 ASSAY OF LACTIC ACID: CPT

## 2024-02-15 PROCEDURE — 97116 GAIT TRAINING THERAPY: CPT | Performed by: PHYSICAL THERAPIST

## 2024-02-15 PROCEDURE — 6360000002 HC RX W HCPCS: Performed by: INTERNAL MEDICINE

## 2024-02-15 PROCEDURE — 97162 PT EVAL MOD COMPLEX 30 MIN: CPT | Performed by: PHYSICAL THERAPIST

## 2024-02-15 PROCEDURE — 97530 THERAPEUTIC ACTIVITIES: CPT

## 2024-02-15 PROCEDURE — 84466 ASSAY OF TRANSFERRIN: CPT

## 2024-02-15 PROCEDURE — 82746 ASSAY OF FOLIC ACID SERUM: CPT

## 2024-02-15 PROCEDURE — 97165 OT EVAL LOW COMPLEX 30 MIN: CPT

## 2024-02-15 PROCEDURE — 82728 ASSAY OF FERRITIN: CPT

## 2024-02-15 PROCEDURE — 83735 ASSAY OF MAGNESIUM: CPT

## 2024-02-15 RX ORDER — LORAZEPAM 0.5 MG/1
0.25 TABLET ORAL 2 TIMES DAILY PRN
Status: DISPENSED | OUTPATIENT
Start: 2024-02-15 | End: 2024-02-16

## 2024-02-15 RX ORDER — IPRATROPIUM BROMIDE AND ALBUTEROL SULFATE 2.5; .5 MG/3ML; MG/3ML
1 SOLUTION RESPIRATORY (INHALATION)
Status: DISCONTINUED | OUTPATIENT
Start: 2024-02-15 | End: 2024-02-20 | Stop reason: HOSPADM

## 2024-02-15 RX ADMIN — DILTIAZEM HYDROCHLORIDE 120 MG: 120 CAPSULE, EXTENDED RELEASE ORAL at 09:58

## 2024-02-15 RX ADMIN — MOMETASONE FUROATE AND FORMOTEROL FUMARATE DIHYDRATE 2 PUFF: 200; 5 AEROSOL RESPIRATORY (INHALATION) at 20:01

## 2024-02-15 RX ADMIN — RIVAROXABAN 15 MG: 15 TABLET, FILM COATED ORAL at 09:58

## 2024-02-15 RX ADMIN — MOMETASONE FUROATE AND FORMOTEROL FUMARATE DIHYDRATE 2 PUFF: 200; 5 AEROSOL RESPIRATORY (INHALATION) at 08:27

## 2024-02-15 RX ADMIN — ISOSORBIDE MONONITRATE 120 MG: 60 TABLET, EXTENDED RELEASE ORAL at 09:58

## 2024-02-15 RX ADMIN — CLONIDINE HYDROCHLORIDE 0.1 MG: 0.1 TABLET ORAL at 09:58

## 2024-02-15 RX ADMIN — POTASSIUM CHLORIDE 40 MEQ: 1500 TABLET, EXTENDED RELEASE ORAL at 09:58

## 2024-02-15 RX ADMIN — LORAZEPAM 0.25 MG: 0.5 TABLET ORAL at 17:08

## 2024-02-15 RX ADMIN — CLONIDINE HYDROCHLORIDE 0.1 MG: 0.1 TABLET ORAL at 21:59

## 2024-02-15 RX ADMIN — Medication 10 ML: at 22:05

## 2024-02-15 RX ADMIN — FUROSEMIDE 4 MG/HR: 10 INJECTION, SOLUTION INTRAMUSCULAR; INTRAVENOUS at 12:40

## 2024-02-15 RX ADMIN — Medication 400 MG: at 12:39

## 2024-02-15 RX ADMIN — IPRATROPIUM BROMIDE AND ALBUTEROL SULFATE 1 DOSE: 2.5; .5 SOLUTION RESPIRATORY (INHALATION) at 08:27

## 2024-02-15 RX ADMIN — Medication 1 CAPSULE: at 21:59

## 2024-02-15 RX ADMIN — Medication 1 CAPSULE: at 09:58

## 2024-02-15 RX ADMIN — ATORVASTATIN CALCIUM 40 MG: 40 TABLET, FILM COATED ORAL at 09:58

## 2024-02-15 RX ADMIN — IPRATROPIUM BROMIDE AND ALBUTEROL SULFATE 1 DOSE: 2.5; .5 SOLUTION RESPIRATORY (INHALATION) at 20:01

## 2024-02-15 RX ADMIN — PREDNISONE 40 MG: 20 TABLET ORAL at 09:58

## 2024-02-15 RX ADMIN — LORAZEPAM 0.25 MG: 0.5 TABLET ORAL at 12:55

## 2024-02-15 RX ADMIN — IPRATROPIUM BROMIDE AND ALBUTEROL SULFATE 1 DOSE: 2.5; .5 SOLUTION RESPIRATORY (INHALATION) at 12:05

## 2024-02-15 RX ADMIN — IRON SUCROSE 200 MG: 20 INJECTION, SOLUTION INTRAVENOUS at 12:39

## 2024-02-15 ASSESSMENT — PAIN SCALES - GENERAL
PAINLEVEL_OUTOF10: 0
PAINLEVEL_OUTOF10: 0

## 2024-02-15 NOTE — RT PROTOCOL NOTE
RT Nebulizer Bronchodilator Protocol Note    There is a bronchodilator order in the chart from a provider indicating to follow the RT Bronchodilator Protocol and there is an “Initiate RT Bronchodilator Protocol” order as well (see protocol at bottom of note).    CXR Findings:  No results found.    The findings from the last RT Protocol Assessment were as follows:  Smoking: Smoker 15 pack years or more  Respiratory Pattern: Dyspnea on exertion or RR 21-25 bpm  Breath Sounds: Slightly diminished and/or crackles  Cough: Strong, spontaneous, non-productive  Indication for Bronchodilator Therapy: None  Bronchodilator Assessment Score: 5    Aerosolized bronchodilator medication orders have been revised according to the RT Nebulizer Bronchodilator Protocol below.    Respiratory Therapist to perform RT Therapy Protocol Assessment initially then follow the protocol.  Repeat RT Therapy Protocol Assessment PRN for score 0-3 or on second treatment, BID, and PRN for scores above 3.    No Indications - adjust the frequency to every 6 hours PRN wheezing or bronchospasm, if no treatments needed after 48 hours then discontinue using Per Protocol order mode.     If indication present, adjust the RT bronchodilator orders based on the Bronchodilator Assessment Score as indicated below.  If a patient is on this medication at home then do not decrease Frequency below that used at home.    0-3 - enter or revise RT bronchodilator order(s) to equivalent RT Bronchodilator order with Frequency of every 4 hours PRN for wheezing or increased work of breathing using Per Protocol order mode.       4-6 - enter or revise RT Bronchodilator order(s) to two equivalent RT bronchodilator orders with one order with BID Frequency and one order with Frequency of every 4 hours PRN wheezing or increased work of breathing using Per Protocol order mode.         7-10 - enter or revise RT Bronchodilator order(s) to two equivalent RT bronchodilator orders with

## 2024-02-15 NOTE — CONSULTS
HF RN consult noted per order set. Chart reviewed. Pt is being treated for acute on chronic diastolic HF and is on a Lasix gtt. This is not a new diagnosis for pt. Her Dry Wt has been noted around 225-230 lbs unless this has changed. Admit wt 238 lbs. Pt has a complex cardiac history and follows with Dr Lopez (last OV 9/1/23 where she was sent to ER for admission). Limited echo pending. I have met with pt and her spouse, Linda, in the recent past and multiple visits prior to that. Fellow RN has as well. They are always receptive to education and have a pretty good understanding of dx and HF management however pt has had 5 admissions in 6 months for multiple chronic conditions (COPD exacerbations,urinary retention, colitis, GIBs, HF, etc). Last admission being in November at [facility]. Pt is active with [facility] who is aware of her admission.     Appropriate HF orders are in place. HF dc instructions added to the AVS as well as to the TOMEKA. Pt has a functioning scale at home (has a nice one from [employer]). She has also been given HF Measuring Cup with marked lines to assist keeping her under her fluid restriction. Pt does have a CardioMEMs device in place however the last reading was over 3 months ago. Will reach out to see if Cardiology wants me to try and obtain a PAD reading (pt's PAD goal is set at 20). Will continue to follow t/o Janae's stay and assist as needed and to help ensure HF GDMT and metrics are met as well as a f/u appt in place prior to dc.   
Nutrition Education    Patient reported not having any questions about her heart healthy diet regimen.  Appetite good, po intake 75% greater of meals.  Will continue to monitor.      EJ DE LA FUENTE RD, LD  Contact Number: Office: [phone]; [place]: [phone]      
CALCIUM 9.1 02/14/2024 04:34 AM    BILITOT 0.4 02/13/2024 11:53 AM    ALKPHOS 86 02/13/2024 11:53 AM    AST 18 02/13/2024 11:53 AM    ALT 12 02/13/2024 11:53 AM     PT/INR:  No results found for: \"PTINR\"  HgBA1c:  Lab Results   Component Value Date    LABA1C 5.4 01/11/2024     Lab Results   Component Value Date    CKTOTAL 22 (L) 04/25/2023    TROPONINI 0.04 (H) 03/26/2023         Cardiac Data     Last EKG:Sinus rhythm with A-V dissociation and Accelerated Junctional rhythm  Septal infarct (cited on or before 27-JUN-2023)  Abnormal ECG  When compared with ECG of 02-NOV-2023 13:41,  AV dissociation is seen in this ECG  ST now depressed in Inferior leads    Echo: 8/3/2023 left ventricular cavity size is normal. There is mild concentric left   ventricular hypertrophy   Overall left ventricular systolic function appears normal with an ejection   fraction of 55-60%.   Diastolic filling parameters suggest grade III diastolic dysfunction.   The left atrium is mildly dilated.   Estimated pulmonary artery systolic pressure is moderately elevated at 51   mmHg assuming a right atrial pressure of 15 mmHg    Stress Test:    Cath:Cath: 9/20/22 [place]     Findings:  Left Main  Distal non obstructive 30% disease, evaluated by IVUS MLA > 7.5 mm2   LAD  Moderate diffuse disease   Circ  Proximal vessel 75-80% stenosis correlating with positive functional study   Mid vessel non-obstructive disease 50%   RCA   with L-R collaterals         Interventions/Vessels  PCI performed to the proximal Lcx with DESx2   Resolute [place] 3.5x22 mm   Resolute [place] 3.5x8 mm   Excellent post angiographic result with BALWINDER 3 flow   Guides/Wires  XB 3.5 guide catheter   Terumo RunThrough   Balanced Heavy Weight   Devices  Opticross boston IVUS   Post % Stenosis  0   Closure Device  Perclose R CFA   Complications  None      Conclusion:   Successful PCI of proximal Lcx with DESx2  Plavix loaded in cath lab  Femoral artery perclosed with excellent 
SPECGRAV 1.008 01/06/2024 10:42 AM    LEUKOCYTESUR MODERATE 01/06/2024 10:42 AM    UROBILINOGEN 0.2 01/06/2024 10:42 AM    BILIRUBINUR Negative 01/06/2024 10:42 AM    BILIRUBINUR neg 07/19/2014 08:36 AM    BLOODU Negative 01/06/2024 10:42 AM    GLUCOSEU Negative 01/06/2024 10:42 AM         IMPRESSION/RECOMMENDATIONS:      CKD 3b/4: Cr noted to be 1.3 mg/dL upon admission. Today, Cr is 1.6 mg/dL. Last known PTA Cr was 1.4 mg/dL. Previously determined baseline was noted to be 1.7-2.0 mg/dL in 9/2023. Based on past couple values, may actually be ~ 1.4 mg/dL.    - Cr 1.3>1.6 mg/dL. Trending up.   - Continue to hold lisinopril. No NSAIDs.    - Continue IV lasix   - At risk for MARK with diuresis   - Maintain normotension. Avoid nephrotoxic agents when possible.    - Urine studies not indicated at this time.   - Daily RFTs.    HFpEF   - TTE (7/2023): LVEF 55-60%. Grade 3 diastolic dysfx. Estimated PAP 51 mmHg.   - Repeat TTE pending   - Continue IV lasix   - FR 1.5 L and low Na+ diet   - Strict I/Os. Daily weights   - Cardiology following    ?COPD exacerbation: on 3 L NC @ baseline   - Management per pulmonology    Afib   - On AC    Hypertension   - -170s   - Continue current antihypertensives   - Should improve with diuresis   - Avoid dropping BP too quickly    CKD-BMD   - PO4 ordered    Anemia of CKD   - Hgb 9.2    - Iron studies ordered   - B12 and folate ordered    Will discuss with nephrology attending physician, Dr. Mar.  See attestation for additional recommendations.    CHRISTINE Beckham - CNP

## 2024-02-15 NOTE — NURSE NAVIGATOR
CardioMEMs reading obtained by Ellyn Patel RN  PAD goal 20  Pt's present PAD wsa 30 & 31. Cardiology (Amanda Weeks) notified.

## 2024-02-16 LAB
ALBUMIN SERPL-MCNC: 3.4 G/DL (ref 3.4–5)
ANION GAP SERPL CALCULATED.3IONS-SCNC: 10 MMOL/L (ref 3–16)
BUN SERPL-MCNC: 48 MG/DL (ref 7–20)
CALCIUM SERPL-MCNC: 9.1 MG/DL (ref 8.3–10.6)
CHLORIDE SERPL-SCNC: 94 MMOL/L (ref 99–110)
CO2 SERPL-SCNC: 38 MMOL/L (ref 21–32)
CREAT SERPL-MCNC: 1.4 MG/DL (ref 0.6–1.2)
DEPRECATED RDW RBC AUTO: 17.7 % (ref 12.4–15.4)
GFR SERPLBLD CREATININE-BSD FMLA CKD-EPI: 39 ML/MIN/{1.73_M2}
GLUCOSE SERPL-MCNC: 95 MG/DL (ref 70–99)
HCT VFR BLD AUTO: 31 % (ref 36–48)
HGB BLD-MCNC: 9.9 G/DL (ref 12–16)
MAGNESIUM SERPL-MCNC: 2.2 MG/DL (ref 1.8–2.4)
MCH RBC QN AUTO: 28.9 PG (ref 26–34)
MCHC RBC AUTO-ENTMCNC: 31.8 G/DL (ref 31–36)
MCV RBC AUTO: 91.1 FL (ref 80–100)
NT-PROBNP SERPL-MCNC: ABNORMAL PG/ML (ref 0–449)
PHOSPHATE SERPL-MCNC: 4 MG/DL (ref 2.5–4.9)
PLATELET # BLD AUTO: 251 K/UL (ref 135–450)
PMV BLD AUTO: 8.8 FL (ref 5–10.5)
POTASSIUM SERPL-SCNC: 3.8 MMOL/L (ref 3.5–5.1)
RBC # BLD AUTO: 3.41 M/UL (ref 4–5.2)
SODIUM SERPL-SCNC: 142 MMOL/L (ref 136–145)
URATE SERPL-MCNC: 14.2 MG/DL (ref 2.6–6)
WBC # BLD AUTO: 12.5 K/UL (ref 4–11)

## 2024-02-16 PROCEDURE — 6370000000 HC RX 637 (ALT 250 FOR IP): Performed by: INTERNAL MEDICINE

## 2024-02-16 PROCEDURE — 80069 RENAL FUNCTION PANEL: CPT

## 2024-02-16 PROCEDURE — 99233 SBSQ HOSP IP/OBS HIGH 50: CPT | Performed by: NURSE PRACTITIONER

## 2024-02-16 PROCEDURE — 2700000000 HC OXYGEN THERAPY PER DAY

## 2024-02-16 PROCEDURE — 6370000000 HC RX 637 (ALT 250 FOR IP): Performed by: NURSE PRACTITIONER

## 2024-02-16 PROCEDURE — 2580000003 HC RX 258: Performed by: INTERNAL MEDICINE

## 2024-02-16 PROCEDURE — 6360000002 HC RX W HCPCS: Performed by: INTERNAL MEDICINE

## 2024-02-16 PROCEDURE — 94760 N-INVAS EAR/PLS OXIMETRY 1: CPT

## 2024-02-16 PROCEDURE — 2060000000 HC ICU INTERMEDIATE R&B

## 2024-02-16 PROCEDURE — 36415 COLL VENOUS BLD VENIPUNCTURE: CPT

## 2024-02-16 PROCEDURE — 94640 AIRWAY INHALATION TREATMENT: CPT

## 2024-02-16 PROCEDURE — 85027 COMPLETE CBC AUTOMATED: CPT

## 2024-02-16 PROCEDURE — 83880 ASSAY OF NATRIURETIC PEPTIDE: CPT

## 2024-02-16 PROCEDURE — 84550 ASSAY OF BLOOD/URIC ACID: CPT

## 2024-02-16 PROCEDURE — 83735 ASSAY OF MAGNESIUM: CPT

## 2024-02-16 PROCEDURE — 6360000002 HC RX W HCPCS

## 2024-02-16 RX ORDER — LORAZEPAM 0.5 MG/1
0.25 TABLET ORAL 2 TIMES DAILY PRN
Status: DISCONTINUED | OUTPATIENT
Start: 2024-02-16 | End: 2024-02-20 | Stop reason: HOSPADM

## 2024-02-16 RX ORDER — HYDRALAZINE HYDROCHLORIDE 25 MG/1
25 TABLET, FILM COATED ORAL EVERY 12 HOURS SCHEDULED
Status: DISCONTINUED | OUTPATIENT
Start: 2024-02-16 | End: 2024-02-18

## 2024-02-16 RX ORDER — ALLOPURINOL 100 MG/1
100 TABLET ORAL DAILY
Status: DISCONTINUED | OUTPATIENT
Start: 2024-02-16 | End: 2024-02-20 | Stop reason: HOSPADM

## 2024-02-16 RX ADMIN — Medication 400 MG: at 13:36

## 2024-02-16 RX ADMIN — FUROSEMIDE 4 MG/HR: 10 INJECTION, SOLUTION INTRAMUSCULAR; INTRAVENOUS at 15:29

## 2024-02-16 RX ADMIN — HYDRALAZINE HYDROCHLORIDE 25 MG: 25 TABLET ORAL at 13:37

## 2024-02-16 RX ADMIN — ATORVASTATIN CALCIUM 40 MG: 40 TABLET, FILM COATED ORAL at 08:19

## 2024-02-16 RX ADMIN — PREDNISONE 40 MG: 20 TABLET ORAL at 08:19

## 2024-02-16 RX ADMIN — ALLOPURINOL 100 MG: 100 TABLET ORAL at 13:37

## 2024-02-16 RX ADMIN — IPRATROPIUM BROMIDE AND ALBUTEROL SULFATE 1 DOSE: 2.5; .5 SOLUTION RESPIRATORY (INHALATION) at 20:08

## 2024-02-16 RX ADMIN — RIVAROXABAN 15 MG: 15 TABLET, FILM COATED ORAL at 08:19

## 2024-02-16 RX ADMIN — IRON SUCROSE 200 MG: 20 INJECTION, SOLUTION INTRAVENOUS at 13:37

## 2024-02-16 RX ADMIN — Medication 1 CAPSULE: at 19:52

## 2024-02-16 RX ADMIN — LORAZEPAM 0.25 MG: 0.5 TABLET ORAL at 17:13

## 2024-02-16 RX ADMIN — POTASSIUM CHLORIDE 40 MEQ: 1500 TABLET, EXTENDED RELEASE ORAL at 08:19

## 2024-02-16 RX ADMIN — ISOSORBIDE MONONITRATE 120 MG: 60 TABLET, EXTENDED RELEASE ORAL at 08:18

## 2024-02-16 RX ADMIN — CLONIDINE HYDROCHLORIDE 0.1 MG: 0.1 TABLET ORAL at 19:51

## 2024-02-16 RX ADMIN — IPRATROPIUM BROMIDE AND ALBUTEROL SULFATE 1 DOSE: 2.5; .5 SOLUTION RESPIRATORY (INHALATION) at 07:24

## 2024-02-16 RX ADMIN — HYDRALAZINE HYDROCHLORIDE 25 MG: 25 TABLET ORAL at 19:51

## 2024-02-16 RX ADMIN — MOMETASONE FUROATE AND FORMOTEROL FUMARATE DIHYDRATE 2 PUFF: 200; 5 AEROSOL RESPIRATORY (INHALATION) at 07:24

## 2024-02-16 RX ADMIN — Medication 1 CAPSULE: at 08:19

## 2024-02-16 RX ADMIN — DILTIAZEM HYDROCHLORIDE 120 MG: 120 CAPSULE, EXTENDED RELEASE ORAL at 08:19

## 2024-02-16 RX ADMIN — CLONIDINE HYDROCHLORIDE 0.1 MG: 0.1 TABLET ORAL at 08:19

## 2024-02-16 RX ADMIN — MOMETASONE FUROATE AND FORMOTEROL FUMARATE DIHYDRATE 2 PUFF: 200; 5 AEROSOL RESPIRATORY (INHALATION) at 20:08

## 2024-02-16 ASSESSMENT — PAIN SCALES - GENERAL: PAINLEVEL_OUTOF10: 0

## 2024-02-17 LAB
ALBUMIN SERPL-MCNC: 3.5 G/DL (ref 3.4–5)
ANION GAP SERPL CALCULATED.3IONS-SCNC: 7 MMOL/L (ref 3–16)
BUN SERPL-MCNC: 47 MG/DL (ref 7–20)
CALCIUM SERPL-MCNC: 8.3 MG/DL (ref 8.3–10.6)
CHLORIDE SERPL-SCNC: 91 MMOL/L (ref 99–110)
CO2 SERPL-SCNC: 40 MMOL/L (ref 21–32)
CREAT SERPL-MCNC: 1.5 MG/DL (ref 0.6–1.2)
DEPRECATED RDW RBC AUTO: 17.6 % (ref 12.4–15.4)
GFR SERPLBLD CREATININE-BSD FMLA CKD-EPI: 35 ML/MIN/{1.73_M2}
GLUCOSE SERPL-MCNC: 121 MG/DL (ref 70–99)
HCT VFR BLD AUTO: 27.4 % (ref 36–48)
HGB BLD-MCNC: 9.2 G/DL (ref 12–16)
LACTATE BLDV-SCNC: 2.6 MMOL/L (ref 0.4–1.9)
MAGNESIUM SERPL-MCNC: 2 MG/DL (ref 1.8–2.4)
MCH RBC QN AUTO: 30.2 PG (ref 26–34)
MCHC RBC AUTO-ENTMCNC: 33.7 G/DL (ref 31–36)
MCV RBC AUTO: 89.5 FL (ref 80–100)
PHOSPHATE SERPL-MCNC: 3.4 MG/DL (ref 2.5–4.9)
PLATELET # BLD AUTO: 234 K/UL (ref 135–450)
PMV BLD AUTO: 9 FL (ref 5–10.5)
POTASSIUM SERPL-SCNC: 3.5 MMOL/L (ref 3.5–5.1)
RBC # BLD AUTO: 3.06 M/UL (ref 4–5.2)
SODIUM SERPL-SCNC: 138 MMOL/L (ref 136–145)
URATE SERPL-MCNC: 12.8 MG/DL (ref 2.6–6)
WBC # BLD AUTO: 14.6 K/UL (ref 4–11)

## 2024-02-17 PROCEDURE — 99233 SBSQ HOSP IP/OBS HIGH 50: CPT | Performed by: NURSE PRACTITIONER

## 2024-02-17 PROCEDURE — 2700000000 HC OXYGEN THERAPY PER DAY

## 2024-02-17 PROCEDURE — 85027 COMPLETE CBC AUTOMATED: CPT

## 2024-02-17 PROCEDURE — 6370000000 HC RX 637 (ALT 250 FOR IP): Performed by: INTERNAL MEDICINE

## 2024-02-17 PROCEDURE — 36415 COLL VENOUS BLD VENIPUNCTURE: CPT

## 2024-02-17 PROCEDURE — 83735 ASSAY OF MAGNESIUM: CPT

## 2024-02-17 PROCEDURE — 80069 RENAL FUNCTION PANEL: CPT

## 2024-02-17 PROCEDURE — 6370000000 HC RX 637 (ALT 250 FOR IP): Performed by: NURSE PRACTITIONER

## 2024-02-17 PROCEDURE — 6360000002 HC RX W HCPCS: Performed by: INTERNAL MEDICINE

## 2024-02-17 PROCEDURE — 2580000003 HC RX 258: Performed by: INTERNAL MEDICINE

## 2024-02-17 PROCEDURE — 84550 ASSAY OF BLOOD/URIC ACID: CPT

## 2024-02-17 PROCEDURE — 83605 ASSAY OF LACTIC ACID: CPT

## 2024-02-17 PROCEDURE — 2060000000 HC ICU INTERMEDIATE R&B

## 2024-02-17 PROCEDURE — 94640 AIRWAY INHALATION TREATMENT: CPT

## 2024-02-17 PROCEDURE — 94760 N-INVAS EAR/PLS OXIMETRY 1: CPT

## 2024-02-17 PROCEDURE — 6360000002 HC RX W HCPCS

## 2024-02-17 RX ADMIN — Medication 1 CAPSULE: at 09:05

## 2024-02-17 RX ADMIN — PREDNISONE 40 MG: 20 TABLET ORAL at 09:05

## 2024-02-17 RX ADMIN — FUROSEMIDE 4 MG/HR: 10 INJECTION, SOLUTION INTRAMUSCULAR; INTRAVENOUS at 14:44

## 2024-02-17 RX ADMIN — IPRATROPIUM BROMIDE AND ALBUTEROL SULFATE 1 DOSE: 2.5; .5 SOLUTION RESPIRATORY (INHALATION) at 20:55

## 2024-02-17 RX ADMIN — POTASSIUM CHLORIDE 40 MEQ: 1500 TABLET, EXTENDED RELEASE ORAL at 09:05

## 2024-02-17 RX ADMIN — Medication 1 CAPSULE: at 19:59

## 2024-02-17 RX ADMIN — MOMETASONE FUROATE AND FORMOTEROL FUMARATE DIHYDRATE 2 PUFF: 200; 5 AEROSOL RESPIRATORY (INHALATION) at 07:54

## 2024-02-17 RX ADMIN — ALLOPURINOL 100 MG: 100 TABLET ORAL at 09:05

## 2024-02-17 RX ADMIN — RIVAROXABAN 15 MG: 15 TABLET, FILM COATED ORAL at 09:05

## 2024-02-17 RX ADMIN — LORAZEPAM 0.25 MG: 0.5 TABLET ORAL at 19:59

## 2024-02-17 RX ADMIN — HYDRALAZINE HYDROCHLORIDE 25 MG: 25 TABLET ORAL at 09:05

## 2024-02-17 RX ADMIN — CLONIDINE HYDROCHLORIDE 0.1 MG: 0.1 TABLET ORAL at 09:05

## 2024-02-17 RX ADMIN — CLONIDINE HYDROCHLORIDE 0.1 MG: 0.1 TABLET ORAL at 19:59

## 2024-02-17 RX ADMIN — ACETAMINOPHEN 650 MG: 325 TABLET ORAL at 19:59

## 2024-02-17 RX ADMIN — MOMETASONE FUROATE AND FORMOTEROL FUMARATE DIHYDRATE 2 PUFF: 200; 5 AEROSOL RESPIRATORY (INHALATION) at 20:55

## 2024-02-17 RX ADMIN — Medication 400 MG: at 14:37

## 2024-02-17 RX ADMIN — IPRATROPIUM BROMIDE AND ALBUTEROL SULFATE 1 DOSE: 2.5; .5 SOLUTION RESPIRATORY (INHALATION) at 07:52

## 2024-02-17 RX ADMIN — IRON SUCROSE 200 MG: 20 INJECTION, SOLUTION INTRAVENOUS at 14:37

## 2024-02-17 RX ADMIN — ATORVASTATIN CALCIUM 40 MG: 40 TABLET, FILM COATED ORAL at 09:05

## 2024-02-17 RX ADMIN — DILTIAZEM HYDROCHLORIDE 120 MG: 120 CAPSULE, EXTENDED RELEASE ORAL at 09:05

## 2024-02-17 RX ADMIN — ISOSORBIDE MONONITRATE 120 MG: 60 TABLET, EXTENDED RELEASE ORAL at 09:05

## 2024-02-17 RX ADMIN — HYDRALAZINE HYDROCHLORIDE 25 MG: 25 TABLET ORAL at 19:59

## 2024-02-17 ASSESSMENT — PAIN SCALES - GENERAL
PAINLEVEL_OUTOF10: 3
PAINLEVEL_OUTOF10: 3

## 2024-02-17 ASSESSMENT — PAIN DESCRIPTION - LOCATION
LOCATION: HEAD
LOCATION: HEAD

## 2024-02-17 ASSESSMENT — PAIN DESCRIPTION - ORIENTATION
ORIENTATION: MID
ORIENTATION: MID

## 2024-02-17 ASSESSMENT — PAIN DESCRIPTION - DESCRIPTORS: DESCRIPTORS: ACHING

## 2024-02-18 ENCOUNTER — APPOINTMENT (OUTPATIENT)
Dept: GENERAL RADIOLOGY | Age: 79
DRG: 291 | End: 2024-02-18
Payer: MEDICARE

## 2024-02-18 LAB
ALBUMIN SERPL-MCNC: 3.6 G/DL (ref 3.4–5)
ANION GAP SERPL CALCULATED.3IONS-SCNC: 10 MMOL/L (ref 3–16)
BACTERIA BLD CULT ORG #2: NORMAL
BACTERIA BLD CULT: NORMAL
BACTERIA URNS QL MICRO: ABNORMAL /HPF
BILIRUB UR QL STRIP.AUTO: NEGATIVE
BUN SERPL-MCNC: 56 MG/DL (ref 7–20)
CALCIUM SERPL-MCNC: 8.3 MG/DL (ref 8.3–10.6)
CHLORIDE SERPL-SCNC: 90 MMOL/L (ref 99–110)
CLARITY UR: ABNORMAL
CO2 SERPL-SCNC: 35 MMOL/L (ref 21–32)
COLOR UR: YELLOW
CREAT SERPL-MCNC: 1.9 MG/DL (ref 0.6–1.2)
DEPRECATED RDW RBC AUTO: 17.3 % (ref 12.4–15.4)
EPI CELLS #/AREA URNS AUTO: 9 /HPF (ref 0–5)
GFR SERPLBLD CREATININE-BSD FMLA CKD-EPI: 27 ML/MIN/{1.73_M2}
GLUCOSE SERPL-MCNC: 133 MG/DL (ref 70–99)
GLUCOSE UR STRIP.AUTO-MCNC: NEGATIVE MG/DL
HCT VFR BLD AUTO: 29.1 % (ref 36–48)
HGB BLD-MCNC: 9.7 G/DL (ref 12–16)
HGB UR QL STRIP.AUTO: NEGATIVE
HYALINE CASTS #/AREA URNS AUTO: 2 /LPF (ref 0–8)
KETONES UR STRIP.AUTO-MCNC: NEGATIVE MG/DL
LACTATE BLDV-SCNC: 1.2 MMOL/L (ref 0.4–1.9)
LEUKOCYTE ESTERASE UR QL STRIP.AUTO: ABNORMAL
MAGNESIUM SERPL-MCNC: 1.9 MG/DL (ref 1.8–2.4)
MCH RBC QN AUTO: 29.9 PG (ref 26–34)
MCHC RBC AUTO-ENTMCNC: 33.3 G/DL (ref 31–36)
MCV RBC AUTO: 89.6 FL (ref 80–100)
NITRITE UR QL STRIP.AUTO: NEGATIVE
PH UR STRIP.AUTO: 7 [PH] (ref 5–8)
PHOSPHATE SERPL-MCNC: 3.5 MG/DL (ref 2.5–4.9)
PLATELET # BLD AUTO: 248 K/UL (ref 135–450)
PMV BLD AUTO: 8.9 FL (ref 5–10.5)
POTASSIUM SERPL-SCNC: 3.6 MMOL/L (ref 3.5–5.1)
PROT UR STRIP.AUTO-MCNC: NEGATIVE MG/DL
RBC # BLD AUTO: 3.25 M/UL (ref 4–5.2)
RBC CLUMPS #/AREA URNS AUTO: 3 /HPF (ref 0–4)
SODIUM SERPL-SCNC: 135 MMOL/L (ref 136–145)
SP GR UR STRIP.AUTO: 1.01 (ref 1–1.03)
UA DIPSTICK W REFLEX MICRO PNL UR: YES
URATE SERPL-MCNC: 11.9 MG/DL (ref 2.6–6)
URN SPEC COLLECT METH UR: ABNORMAL
UROBILINOGEN UR STRIP-ACNC: 0.2 E.U./DL
WBC # BLD AUTO: 17.2 K/UL (ref 4–11)
WBC #/AREA URNS AUTO: 71 /HPF (ref 0–5)

## 2024-02-18 PROCEDURE — 71046 X-RAY EXAM CHEST 2 VIEWS: CPT

## 2024-02-18 PROCEDURE — 94640 AIRWAY INHALATION TREATMENT: CPT

## 2024-02-18 PROCEDURE — 87077 CULTURE AEROBIC IDENTIFY: CPT

## 2024-02-18 PROCEDURE — 83735 ASSAY OF MAGNESIUM: CPT

## 2024-02-18 PROCEDURE — 85027 COMPLETE CBC AUTOMATED: CPT

## 2024-02-18 PROCEDURE — 81001 URINALYSIS AUTO W/SCOPE: CPT

## 2024-02-18 PROCEDURE — 6370000000 HC RX 637 (ALT 250 FOR IP): Performed by: INTERNAL MEDICINE

## 2024-02-18 PROCEDURE — 94760 N-INVAS EAR/PLS OXIMETRY 1: CPT

## 2024-02-18 PROCEDURE — 87086 URINE CULTURE/COLONY COUNT: CPT

## 2024-02-18 PROCEDURE — 2580000003 HC RX 258: Performed by: INTERNAL MEDICINE

## 2024-02-18 PROCEDURE — 6370000000 HC RX 637 (ALT 250 FOR IP): Performed by: NURSE PRACTITIONER

## 2024-02-18 PROCEDURE — 80069 RENAL FUNCTION PANEL: CPT

## 2024-02-18 PROCEDURE — 36415 COLL VENOUS BLD VENIPUNCTURE: CPT

## 2024-02-18 PROCEDURE — 84550 ASSAY OF BLOOD/URIC ACID: CPT

## 2024-02-18 PROCEDURE — 6360000002 HC RX W HCPCS: Performed by: INTERNAL MEDICINE

## 2024-02-18 PROCEDURE — 2060000000 HC ICU INTERMEDIATE R&B

## 2024-02-18 PROCEDURE — 83605 ASSAY OF LACTIC ACID: CPT

## 2024-02-18 PROCEDURE — 99233 SBSQ HOSP IP/OBS HIGH 50: CPT | Performed by: NURSE PRACTITIONER

## 2024-02-18 PROCEDURE — 87186 SC STD MICRODIL/AGAR DIL: CPT

## 2024-02-18 PROCEDURE — 2700000000 HC OXYGEN THERAPY PER DAY

## 2024-02-18 RX ORDER — HYDRALAZINE HYDROCHLORIDE 25 MG/1
25 TABLET, FILM COATED ORAL EVERY 8 HOURS SCHEDULED
Status: DISCONTINUED | OUTPATIENT
Start: 2024-02-18 | End: 2024-02-19

## 2024-02-18 RX ORDER — TORSEMIDE 20 MG/1
40 TABLET ORAL DAILY
Status: DISCONTINUED | OUTPATIENT
Start: 2024-02-19 | End: 2024-02-20

## 2024-02-18 RX ADMIN — CLONIDINE HYDROCHLORIDE 0.1 MG: 0.1 TABLET ORAL at 08:06

## 2024-02-18 RX ADMIN — PREDNISONE 40 MG: 20 TABLET ORAL at 08:07

## 2024-02-18 RX ADMIN — MOMETASONE FUROATE AND FORMOTEROL FUMARATE DIHYDRATE 2 PUFF: 200; 5 AEROSOL RESPIRATORY (INHALATION) at 20:05

## 2024-02-18 RX ADMIN — Medication 10 ML: at 20:16

## 2024-02-18 RX ADMIN — MOMETASONE FUROATE AND FORMOTEROL FUMARATE DIHYDRATE 2 PUFF: 200; 5 AEROSOL RESPIRATORY (INHALATION) at 08:27

## 2024-02-18 RX ADMIN — ALLOPURINOL 100 MG: 100 TABLET ORAL at 08:06

## 2024-02-18 RX ADMIN — Medication 1 CAPSULE: at 20:16

## 2024-02-18 RX ADMIN — ACETAMINOPHEN 650 MG: 325 TABLET ORAL at 20:16

## 2024-02-18 RX ADMIN — LORAZEPAM 0.25 MG: 0.5 TABLET ORAL at 20:16

## 2024-02-18 RX ADMIN — ATORVASTATIN CALCIUM 40 MG: 40 TABLET, FILM COATED ORAL at 08:06

## 2024-02-18 RX ADMIN — POTASSIUM CHLORIDE 40 MEQ: 1500 TABLET, EXTENDED RELEASE ORAL at 08:07

## 2024-02-18 RX ADMIN — DILTIAZEM HYDROCHLORIDE 120 MG: 120 CAPSULE, EXTENDED RELEASE ORAL at 08:06

## 2024-02-18 RX ADMIN — CEFEPIME 2000 MG: 2 INJECTION, POWDER, FOR SOLUTION INTRAVENOUS at 14:54

## 2024-02-18 RX ADMIN — RIVAROXABAN 15 MG: 15 TABLET, FILM COATED ORAL at 08:07

## 2024-02-18 RX ADMIN — HYDRALAZINE HYDROCHLORIDE 25 MG: 25 TABLET ORAL at 21:23

## 2024-02-18 RX ADMIN — CLONIDINE HYDROCHLORIDE 0.1 MG: 0.1 TABLET ORAL at 20:16

## 2024-02-18 RX ADMIN — ISOSORBIDE MONONITRATE 120 MG: 60 TABLET, EXTENDED RELEASE ORAL at 08:07

## 2024-02-18 RX ADMIN — HYDRALAZINE HYDROCHLORIDE 25 MG: 25 TABLET ORAL at 08:07

## 2024-02-18 RX ADMIN — IPRATROPIUM BROMIDE AND ALBUTEROL SULFATE 1 DOSE: 2.5; .5 SOLUTION RESPIRATORY (INHALATION) at 20:05

## 2024-02-18 RX ADMIN — Medication 400 MG: at 14:46

## 2024-02-18 RX ADMIN — Medication 1 CAPSULE: at 14:46

## 2024-02-18 RX ADMIN — IPRATROPIUM BROMIDE AND ALBUTEROL SULFATE 1 DOSE: 2.5; .5 SOLUTION RESPIRATORY (INHALATION) at 08:27

## 2024-02-18 RX ADMIN — LORAZEPAM 0.25 MG: 0.5 TABLET ORAL at 08:06

## 2024-02-18 ASSESSMENT — PAIN DESCRIPTION - DESCRIPTORS: DESCRIPTORS: THROBBING

## 2024-02-18 ASSESSMENT — PAIN DESCRIPTION - LOCATION: LOCATION: HEAD

## 2024-02-18 ASSESSMENT — PAIN DESCRIPTION - ORIENTATION: ORIENTATION: MID

## 2024-02-18 ASSESSMENT — PAIN SCALES - GENERAL
PAINLEVEL_OUTOF10: 0
PAINLEVEL_OUTOF10: 3

## 2024-02-19 LAB
ALBUMIN SERPL-MCNC: 3.7 G/DL (ref 3.4–5)
ANION GAP SERPL CALCULATED.3IONS-SCNC: 8 MMOL/L (ref 3–16)
BUN SERPL-MCNC: 48 MG/DL (ref 7–20)
CALCIUM SERPL-MCNC: 9.3 MG/DL (ref 8.3–10.6)
CHLORIDE SERPL-SCNC: 95 MMOL/L (ref 99–110)
CO2 SERPL-SCNC: 36 MMOL/L (ref 21–32)
CREAT SERPL-MCNC: 1.4 MG/DL (ref 0.6–1.2)
DEPRECATED RDW RBC AUTO: 18.3 % (ref 12.4–15.4)
GFR SERPLBLD CREATININE-BSD FMLA CKD-EPI: 38 ML/MIN/{1.73_M2}
GLUCOSE SERPL-MCNC: 121 MG/DL (ref 70–99)
HCT VFR BLD AUTO: 31.3 % (ref 36–48)
HGB BLD-MCNC: 10.1 G/DL (ref 12–16)
MCH RBC QN AUTO: 29.5 PG (ref 26–34)
MCHC RBC AUTO-ENTMCNC: 32.2 G/DL (ref 31–36)
MCV RBC AUTO: 91.5 FL (ref 80–100)
NT-PROBNP SERPL-MCNC: 6732 PG/ML (ref 0–449)
PHOSPHATE SERPL-MCNC: 3 MG/DL (ref 2.5–4.9)
PLATELET # BLD AUTO: 275 K/UL (ref 135–450)
PMV BLD AUTO: 9 FL (ref 5–10.5)
POTASSIUM SERPL-SCNC: 4.7 MMOL/L (ref 3.5–5.1)
RBC # BLD AUTO: 3.42 M/UL (ref 4–5.2)
SODIUM SERPL-SCNC: 139 MMOL/L (ref 136–145)
URATE SERPL-MCNC: 10 MG/DL (ref 2.6–6)
WBC # BLD AUTO: 15.8 K/UL (ref 4–11)

## 2024-02-19 PROCEDURE — 80069 RENAL FUNCTION PANEL: CPT

## 2024-02-19 PROCEDURE — 2580000003 HC RX 258: Performed by: INTERNAL MEDICINE

## 2024-02-19 PROCEDURE — 6370000000 HC RX 637 (ALT 250 FOR IP): Performed by: INTERNAL MEDICINE

## 2024-02-19 PROCEDURE — 2700000000 HC OXYGEN THERAPY PER DAY

## 2024-02-19 PROCEDURE — 94760 N-INVAS EAR/PLS OXIMETRY 1: CPT

## 2024-02-19 PROCEDURE — 85027 COMPLETE CBC AUTOMATED: CPT

## 2024-02-19 PROCEDURE — 2060000000 HC ICU INTERMEDIATE R&B

## 2024-02-19 PROCEDURE — 6360000002 HC RX W HCPCS: Performed by: INTERNAL MEDICINE

## 2024-02-19 PROCEDURE — 36415 COLL VENOUS BLD VENIPUNCTURE: CPT

## 2024-02-19 PROCEDURE — 99232 SBSQ HOSP IP/OBS MODERATE 35: CPT | Performed by: INTERNAL MEDICINE

## 2024-02-19 PROCEDURE — 83880 ASSAY OF NATRIURETIC PEPTIDE: CPT

## 2024-02-19 PROCEDURE — 94640 AIRWAY INHALATION TREATMENT: CPT

## 2024-02-19 PROCEDURE — 84550 ASSAY OF BLOOD/URIC ACID: CPT

## 2024-02-19 PROCEDURE — 97110 THERAPEUTIC EXERCISES: CPT

## 2024-02-19 RX ORDER — HYDRALAZINE HYDROCHLORIDE 50 MG/1
50 TABLET, FILM COATED ORAL EVERY 8 HOURS SCHEDULED
Status: DISCONTINUED | OUTPATIENT
Start: 2024-02-19 | End: 2024-02-20 | Stop reason: HOSPADM

## 2024-02-19 RX ADMIN — HYDRALAZINE HYDROCHLORIDE 50 MG: 50 TABLET ORAL at 12:46

## 2024-02-19 RX ADMIN — LORAZEPAM 0.25 MG: 0.5 TABLET ORAL at 14:45

## 2024-02-19 RX ADMIN — Medication 400 MG: at 12:46

## 2024-02-19 RX ADMIN — CEFEPIME 1000 MG: 1 INJECTION, POWDER, FOR SOLUTION INTRAMUSCULAR; INTRAVENOUS at 12:52

## 2024-02-19 RX ADMIN — CLONIDINE HYDROCHLORIDE 0.1 MG: 0.1 TABLET ORAL at 20:01

## 2024-02-19 RX ADMIN — Medication 10 ML: at 08:22

## 2024-02-19 RX ADMIN — ISOSORBIDE MONONITRATE 120 MG: 60 TABLET, EXTENDED RELEASE ORAL at 08:21

## 2024-02-19 RX ADMIN — Medication 1 CAPSULE: at 08:21

## 2024-02-19 RX ADMIN — DILTIAZEM HYDROCHLORIDE 120 MG: 120 CAPSULE, EXTENDED RELEASE ORAL at 08:21

## 2024-02-19 RX ADMIN — ALLOPURINOL 100 MG: 100 TABLET ORAL at 08:21

## 2024-02-19 RX ADMIN — Medication 10 ML: at 20:02

## 2024-02-19 RX ADMIN — HYDRALAZINE HYDROCHLORIDE 25 MG: 25 TABLET ORAL at 05:34

## 2024-02-19 RX ADMIN — RIVAROXABAN 15 MG: 15 TABLET, FILM COATED ORAL at 08:21

## 2024-02-19 RX ADMIN — LORAZEPAM 0.25 MG: 0.5 TABLET ORAL at 21:20

## 2024-02-19 RX ADMIN — POTASSIUM CHLORIDE 40 MEQ: 1500 TABLET, EXTENDED RELEASE ORAL at 08:28

## 2024-02-19 RX ADMIN — HYDRALAZINE HYDROCHLORIDE 50 MG: 50 TABLET ORAL at 20:01

## 2024-02-19 RX ADMIN — Medication 1 CAPSULE: at 20:01

## 2024-02-19 RX ADMIN — IPRATROPIUM BROMIDE AND ALBUTEROL SULFATE 1 DOSE: 2.5; .5 SOLUTION RESPIRATORY (INHALATION) at 08:13

## 2024-02-19 RX ADMIN — TORSEMIDE 40 MG: 20 TABLET ORAL at 08:21

## 2024-02-19 RX ADMIN — CLONIDINE HYDROCHLORIDE 0.1 MG: 0.1 TABLET ORAL at 08:21

## 2024-02-19 RX ADMIN — MOMETASONE FUROATE AND FORMOTEROL FUMARATE DIHYDRATE 2 PUFF: 200; 5 AEROSOL RESPIRATORY (INHALATION) at 08:23

## 2024-02-19 RX ADMIN — ATORVASTATIN CALCIUM 40 MG: 40 TABLET, FILM COATED ORAL at 08:21

## 2024-02-19 ASSESSMENT — PAIN SCALES - GENERAL: PAINLEVEL_OUTOF10: 0

## 2024-02-20 VITALS
TEMPERATURE: 98 F | SYSTOLIC BLOOD PRESSURE: 187 MMHG | DIASTOLIC BLOOD PRESSURE: 94 MMHG | BODY MASS INDEX: 40.12 KG/M2 | OXYGEN SATURATION: 100 % | RESPIRATION RATE: 18 BRPM | HEART RATE: 103 BPM | WEIGHT: 226.41 LBS | HEIGHT: 63 IN

## 2024-02-20 LAB
ALBUMIN SERPL-MCNC: 3.8 G/DL (ref 3.4–5)
ANION GAP SERPL CALCULATED.3IONS-SCNC: 12 MMOL/L (ref 3–16)
BUN SERPL-MCNC: 58 MG/DL (ref 7–20)
CALCIUM SERPL-MCNC: 8.9 MG/DL (ref 8.3–10.6)
CHLORIDE SERPL-SCNC: 96 MMOL/L (ref 99–110)
CO2 SERPL-SCNC: 31 MMOL/L (ref 21–32)
CREAT SERPL-MCNC: 1.5 MG/DL (ref 0.6–1.2)
DEPRECATED RDW RBC AUTO: 18.3 % (ref 12.4–15.4)
GFR SERPLBLD CREATININE-BSD FMLA CKD-EPI: 35 ML/MIN/{1.73_M2}
GLUCOSE SERPL-MCNC: 97 MG/DL (ref 70–99)
HCT VFR BLD AUTO: 31.6 % (ref 36–48)
HGB BLD-MCNC: 10.5 G/DL (ref 12–16)
MCH RBC QN AUTO: 30.1 PG (ref 26–34)
MCHC RBC AUTO-ENTMCNC: 33.2 G/DL (ref 31–36)
MCV RBC AUTO: 90.9 FL (ref 80–100)
PHOSPHATE SERPL-MCNC: 4.1 MG/DL (ref 2.5–4.9)
PLATELET # BLD AUTO: 263 K/UL (ref 135–450)
PMV BLD AUTO: 8.9 FL (ref 5–10.5)
POTASSIUM SERPL-SCNC: 3.6 MMOL/L (ref 3.5–5.1)
RBC # BLD AUTO: 3.48 M/UL (ref 4–5.2)
SODIUM SERPL-SCNC: 139 MMOL/L (ref 136–145)
URATE SERPL-MCNC: 9.8 MG/DL (ref 2.6–6)
WBC # BLD AUTO: 14.6 K/UL (ref 4–11)

## 2024-02-20 PROCEDURE — 84550 ASSAY OF BLOOD/URIC ACID: CPT

## 2024-02-20 PROCEDURE — 2700000000 HC OXYGEN THERAPY PER DAY

## 2024-02-20 PROCEDURE — 6360000002 HC RX W HCPCS: Performed by: INTERNAL MEDICINE

## 2024-02-20 PROCEDURE — 9990000010 HC NO CHARGE VISIT

## 2024-02-20 PROCEDURE — 80069 RENAL FUNCTION PANEL: CPT

## 2024-02-20 PROCEDURE — 94640 AIRWAY INHALATION TREATMENT: CPT

## 2024-02-20 PROCEDURE — 6360000002 HC RX W HCPCS: Performed by: REGISTERED NURSE

## 2024-02-20 PROCEDURE — 85027 COMPLETE CBC AUTOMATED: CPT

## 2024-02-20 PROCEDURE — 2580000003 HC RX 258: Performed by: INTERNAL MEDICINE

## 2024-02-20 PROCEDURE — 94760 N-INVAS EAR/PLS OXIMETRY 1: CPT

## 2024-02-20 PROCEDURE — 6370000000 HC RX 637 (ALT 250 FOR IP): Performed by: INTERNAL MEDICINE

## 2024-02-20 PROCEDURE — 6370000000 HC RX 637 (ALT 250 FOR IP)

## 2024-02-20 PROCEDURE — 36415 COLL VENOUS BLD VENIPUNCTURE: CPT

## 2024-02-20 RX ORDER — TORSEMIDE 20 MG/1
20 TABLET ORAL DAILY
Qty: 30 TABLET | Refills: 0 | Status: SHIPPED | OUTPATIENT
Start: 2024-02-21 | End: 2024-02-23 | Stop reason: SDUPTHER

## 2024-02-20 RX ORDER — HYDRALAZINE HYDROCHLORIDE 50 MG/1
50 TABLET, FILM COATED ORAL EVERY 8 HOURS SCHEDULED
Qty: 90 TABLET | Refills: 0 | Status: SHIPPED | OUTPATIENT
Start: 2024-02-20

## 2024-02-20 RX ORDER — ALLOPURINOL 100 MG/1
100 TABLET ORAL DAILY
Qty: 30 TABLET | Refills: 0 | Status: SHIPPED | OUTPATIENT
Start: 2024-02-21

## 2024-02-20 RX ORDER — TORSEMIDE 20 MG/1
20 TABLET ORAL DAILY
Status: DISCONTINUED | OUTPATIENT
Start: 2024-02-21 | End: 2024-02-20 | Stop reason: HOSPADM

## 2024-02-20 RX ORDER — HYDRALAZINE HYDROCHLORIDE 20 MG/ML
10 INJECTION INTRAMUSCULAR; INTRAVENOUS EVERY 8 HOURS PRN
Status: DISCONTINUED | OUTPATIENT
Start: 2024-02-20 | End: 2024-02-20

## 2024-02-20 RX ORDER — CLONIDINE HYDROCHLORIDE 0.1 MG/1
0.1 TABLET ORAL 3 TIMES DAILY
Qty: 60 TABLET | Refills: 0 | Status: SHIPPED | OUTPATIENT
Start: 2024-02-20

## 2024-02-20 RX ORDER — POTASSIUM CHLORIDE 20 MEQ/1
20 TABLET, EXTENDED RELEASE ORAL ONCE
Status: COMPLETED | OUTPATIENT
Start: 2024-02-20 | End: 2024-02-20

## 2024-02-20 RX ORDER — CLONIDINE HYDROCHLORIDE 0.1 MG/1
0.1 TABLET ORAL 3 TIMES DAILY
Status: DISCONTINUED | OUTPATIENT
Start: 2024-02-20 | End: 2024-02-20 | Stop reason: HOSPADM

## 2024-02-20 RX ORDER — AMOXICILLIN AND CLAVULANATE POTASSIUM 875; 125 MG/1; MG/1
1 TABLET, FILM COATED ORAL 2 TIMES DAILY
Qty: 6 TABLET | Refills: 0 | Status: SHIPPED | OUTPATIENT
Start: 2024-02-20 | End: 2024-02-23

## 2024-02-20 RX ORDER — HYDRALAZINE HYDROCHLORIDE 20 MG/ML
5 INJECTION INTRAMUSCULAR; INTRAVENOUS EVERY 8 HOURS PRN
Status: DISCONTINUED | OUTPATIENT
Start: 2024-02-20 | End: 2024-02-20 | Stop reason: HOSPADM

## 2024-02-20 RX ADMIN — ISOSORBIDE MONONITRATE 120 MG: 60 TABLET, EXTENDED RELEASE ORAL at 07:41

## 2024-02-20 RX ADMIN — HYDRALAZINE HYDROCHLORIDE 10 MG: 20 INJECTION, SOLUTION INTRAMUSCULAR; INTRAVENOUS at 04:11

## 2024-02-20 RX ADMIN — DILTIAZEM HYDROCHLORIDE 120 MG: 120 CAPSULE, EXTENDED RELEASE ORAL at 07:41

## 2024-02-20 RX ADMIN — ALLOPURINOL 100 MG: 100 TABLET ORAL at 07:41

## 2024-02-20 RX ADMIN — Medication 1 CAPSULE: at 10:07

## 2024-02-20 RX ADMIN — TORSEMIDE 40 MG: 20 TABLET ORAL at 07:41

## 2024-02-20 RX ADMIN — POTASSIUM CHLORIDE 20 MEQ: 1500 TABLET, EXTENDED RELEASE ORAL at 10:07

## 2024-02-20 RX ADMIN — CLONIDINE HYDROCHLORIDE 0.1 MG: 0.1 TABLET ORAL at 07:41

## 2024-02-20 RX ADMIN — MOMETASONE FUROATE AND FORMOTEROL FUMARATE DIHYDRATE 2 PUFF: 200; 5 AEROSOL RESPIRATORY (INHALATION) at 08:15

## 2024-02-20 RX ADMIN — Medication 10 ML: at 07:41

## 2024-02-20 RX ADMIN — RIVAROXABAN 15 MG: 15 TABLET, FILM COATED ORAL at 07:41

## 2024-02-20 RX ADMIN — Medication 400 MG: at 10:07

## 2024-02-20 RX ADMIN — ATORVASTATIN CALCIUM 40 MG: 40 TABLET, FILM COATED ORAL at 07:41

## 2024-02-20 RX ADMIN — HYDRALAZINE HYDROCHLORIDE 50 MG: 50 TABLET ORAL at 13:07

## 2024-02-20 RX ADMIN — CLONIDINE HYDROCHLORIDE 0.1 MG: 0.1 TABLET ORAL at 13:07

## 2024-02-20 RX ADMIN — HYDRALAZINE HYDROCHLORIDE 50 MG: 50 TABLET ORAL at 05:46

## 2024-02-20 RX ADMIN — CEFEPIME 1000 MG: 1 INJECTION, POWDER, FOR SOLUTION INTRAMUSCULAR; INTRAVENOUS at 13:13

## 2024-02-20 RX ADMIN — IPRATROPIUM BROMIDE AND ALBUTEROL SULFATE 1 DOSE: 2.5; .5 SOLUTION RESPIRATORY (INHALATION) at 08:05

## 2024-02-20 ASSESSMENT — PAIN SCALES - GENERAL
PAINLEVEL_OUTOF10: 0

## 2024-02-20 NOTE — PROGRESS NOTES
Cardiology - PROGRESS NOTE    Admit Date: 2/13/2024     Reason for follow up: CHF   9 y.o. patient who is very well-known to me with very complex cardiac history of coronary artery disease s/p PCI, recurrent admissions for diastolic heart failure decompensation, atrial fibrillation with multiple ablation and dofetilide therapy, COPD, chronic kidney disease s/p CardioMEMS presented to the hospital with complaints of worsening shortness of breath and lower extremity edema she has had multiple admissions for this last year but has not been back to see me in the office for last 4 to 5 months.  She says she started having swelling in her lower extremity and increasing shortness of breath but denies any chest pain     She presented to emergency room and she was found to have CHF decompensation leading to this cardiac consultatio        Social History:   reports that she quit smoking about 10 years ago. Her smoking use included cigarettes. She started smoking about 47 years ago. She has a 37.0 pack-year smoking history. She has been exposed to tobacco smoke. She has quit using smokeless tobacco. She reports that she does not drink alcohol and does not use drugs.   Family History: family history includes Heart Disease in her father; Hypertension in an other family member.  Living Situation: with spouse       Interval History:   Patient seen and examined and notes reviewed   -4.3 L   Cr 1.4   Sleepy today   Remains on O2   Good diuresis    Hypertensive   All other systems reviewed and negative except as above.        Diet: ADULT DIET; Regular; Low Sodium (2 gm); 2000 ml  Pain is:None  Nausea:None    In: 871.3 [P.O.:770; I.V.:101.3]  Out: 3900    Patient Vitals for the past 96 hrs (Last 3 readings):   Weight   02/16/24 0551 101.9 kg (224 lb 10.4 oz)   02/15/24 0304 105.2 kg (231 lb 14.8 oz)   02/14/24 0449 104.5 kg (230 lb 6.1 oz)           Data:   Scheduled Meds: 
                                                        Cardiology - PROGRESS NOTE    Admit Date: 2/13/2024     Reason for follow up: CHF   9 y.o. patient who is very well-known to me with very complex cardiac history of coronary artery disease s/p PCI, recurrent admissions for diastolic heart failure decompensation, atrial fibrillation with multiple ablation and dofetilide therapy, COPD, chronic kidney disease s/p CardioMEMS presented to the hospital with complaints of worsening shortness of breath and lower extremity edema she has had multiple admissions for this last year but has not been back to see me in the office for last 4 to 5 months.  She says she started having swelling in her lower extremity and increasing shortness of breath but denies any chest pain     She presented to emergency room and she was found to have CHF decompensation leading to this cardiac consultatio        Social History:   reports that she quit smoking about 10 years ago. Her smoking use included cigarettes. She started smoking about 47 years ago. She has a 37.0 pack-year smoking history. She has been exposed to tobacco smoke. She has quit using smokeless tobacco. She reports that she does not drink alcohol and does not use drugs.   Family History: family history includes Heart Disease in her father; Hypertension in an other family member.  Living Situation: with spouse       Interval History:   Patient seen and examined and notes reviewed   -6.3 L   Cr 1.5  Remains on lasix gtt   Sitting in chair   Feels better today   All other systems reviewed and negative except as above.        Diet: ADULT DIET; Regular; Low Sodium (2 gm); 2000 ml  Pain is:None  Nausea:None    In: 1526.1 [P.O.:1440; I.V.:86.1]  Out: 3500    Patient Vitals for the past 96 hrs (Last 3 readings):   Weight   02/17/24 0346 102.5 kg (225 lb 15.5 oz)   02/16/24 0551 101.9 kg (224 lb 10.4 oz)   02/15/24 0304 105.2 kg (231 lb 14.8 oz)           Data:   Scheduled Meds: 
                                                        Cardiology - PROGRESS NOTE    Admit Date: 2/13/2024     Reason for follow up: CHF   9 y.o. patient who is very well-known to me with very complex cardiac history of coronary artery disease s/p PCI, recurrent admissions for diastolic heart failure decompensation, atrial fibrillation with multiple ablation and dofetilide therapy, COPD, chronic kidney disease s/p CardioMEMS presented to the hospital with complaints of worsening shortness of breath and lower extremity edema she has had multiple admissions for this last year but has not been back to see me in the office for last 4 to 5 months.  She says she started having swelling in her lower extremity and increasing shortness of breath but denies any chest pain     She presented to emergency room and she was found to have CHF decompensation leading to this cardiac consultatio        Social History:   reports that she quit smoking about 10 years ago. Her smoking use included cigarettes. She started smoking about 47 years ago. She has a 37.0 pack-year smoking history. She has been exposed to tobacco smoke. She has quit using smokeless tobacco. She reports that she does not drink alcohol and does not use drugs.   Family History: family history includes Heart Disease in her father; Hypertension in an other family member.  Living Situation: with spouse       Interval History:   Patient seen and examined and notes reviewed   -8.0 L   Cr 1.9  Remains on lasix gtt   I/O do not correlate   Remains hypertensive    Sitting in chair   All other systems reviewed and negative except as above.        Diet: ADULT DIET; Regular; Low Sodium (2 gm); 2000 ml  Pain is:None  Nausea:None    In: 1210 [P.O.:1210]  Out: 3200    Patient Vitals for the past 96 hrs (Last 3 readings):   Weight   02/18/24 0617 102.2 kg (225 lb 5 oz)   02/17/24 0346 102.5 kg (225 lb 15.5 oz)   02/16/24 0551 101.9 kg (224 lb 10.4 oz)           Data:   Scheduled 
      [facility]   Daily Progress Note      Admit Date:  2/13/2024    Reason for initial consultation: CHF    CC: \"Short of breath\"    Interval History:  Pt with no acute overnight cardiac events.  Patient with CardioMEMS device which was reinterrogated and at her baseline reading.  She endorses chronic shortness of breath.  Her  is present bedside.  She denies associated chest pains.      Past surgical history/social history/family history:   has a past surgical history that includes Colonoscopy (09/02/2007); Hysterectomy (1990); Appendectomy (1990); bladder suspension; Cataract removal; Tonsillectomy (as a child); tumor excision; Cholecystectomy (10/15/2013); Colonoscopy (06/16/2017); joint replacement (12/2013); Abdominal aortic aneurysm repair; Cardiac surgery; Cardiac catheterization (Right, 11/28/2022); Upper gastrointestinal endoscopy (N/A, 4/26/2023); Colonoscopy (N/A, 5/10/2023); Capsule endoscopy (N/A, 5/22/2023); Upper gastrointestinal endoscopy (N/A, 5/24/2023); and colectomy (N/A, 5/26/2023).   reports that she quit smoking about 10 years ago. Her smoking use included cigarettes. She started smoking about 47 years ago. She has a 37.0 pack-year smoking history. She has been exposed to tobacco smoke. She has quit using smokeless tobacco. She reports that she does not drink alcohol and does not use drugs.  family history includes Heart Disease in her father; Hypertension in an other family member.    Review of Systems:   Constitutional: No unanticipated weight loss. There's been no change in energy level, sleep pattern, or activity level. No fevers, chills.   Eyes: No visual changes or diplopia. No scleral icterus.  ENT: No Headaches, hearing loss or vertigo. No mouth sores or sore throat.  Cardiovascular: as reviewed in HPI  Respiratory: No cough or wheezing, no sputum production. No hemoptysis.   Gastrointestinal: No abdominal pain, appetite loss, blood in stools. No change in bowel or 
    V2.0    [facility] [place], [place] Progress Note      Name:  Janae Perez /Age/Sex: 1945  (79 y.o. female)   MRN & CSN:  [id number] & [id number] Encounter Date/Time: 2024 12:50 PM EST   Location:  [id number] PCP: Boubacar Dhillon MD     Attending:Ward Davey MD       Hospital Day: 5    Assessment and Recommendations   Janae Perez is a 79 y.o. female who presents with Acute on chronic combined systolic (congestive) and diastolic (congestive) heart failure (HCC)      Plan:     Acute on chronic likely diastolic heart failure, started on Lasix 40 mg IV twice daily, creatinine improved to 1.4 on Lasix drip was started, creatinine 1.5 relatively stable from yesterday 1.4.  Nephrology and cardiology following  Possible COPD exacerbation, p.o. prednisone DuoNeb, patient is not having productive cough   Chronic respiratory failure with hypoxia, uses oxygen at home with some worsening shortness of breath and increased need of oxygen due to above.  Generalized weakness PT OT  A-fib continue on Xarelto  Essential hypertension, continue p.o. medications uncontrolled on admission, better controlled.  Obesity, complicating current presentation increasing risk for morbidity mortality, counseled for weight loss  Minimally elevated troponin, flat due to CKD likely and congestive heart failure no chest pain cardiology consulted  Abnormal EKG without chest pain cardiology consulted      Diet ADULT DIET; Regular; Low Sodium (2 gm); 2000 ml   DVT Prophylaxis [] Lovenox, []  Heparin, [] SCDs, [] Ambulation,  [] Eliquis, [] Xarelto  [] Coumadin   Code Status DNR-CCA             Personally reviewed Lab Studies and Imaging     Discussed management of the case with nephrology    Telemetry strip reviewed no ST elevation      Drugs that require monitoring for toxicity include furosemide and the method of monitoring was creatinine    Medical Decision Making:  The following items were considered in medical decision making:  Discussion of 
    V2.0    [facility] [place]      Name:  Janae Perez /Age/Sex: 1945  (79 y.o. female)   MRN & CSN:  [id number] & [id number] Encounter Date/Time: 2024 8:02 AM EST   Location:  [id number]/5119-01 PCP: Boubacar Dhillon MD     Attending:Ward Davey MD       Hospital Day: 2    Assessment and Recommendations   Janae Perez is a 79 y.o. female who presents with Acute on chronic combined systolic (congestive) and diastolic (congestive) heart failure (HCC)      Plan:     Acute on chronic likely combined systolic and diastolic heart failure, admitted to the hospital, started on Lasix 40 mg IV twice daily, creatinine increased to 1.6 this morning will decrease Lasix to 40 daily, nephrology consulted, will hold lisinopril.  Echo pending   Possible COPD exacerbation, p.o. prednisone DuoNeb, patient is not having productive cough we will keep holding on starting antibiotics at this time  Chronic respiratory failure with hypoxia, uses oxygen at home with some worsening shortness of breath and increased need of oxygen due to above  Generalized weakness PT OT  A-fib continue on Xarelto  Essential hypertension, continue p.o. medications uncontrolled on admission, better controlled telemetry monitoring.  Obesity, complicating current presentation increasing risk for morbidity mortality, counseled for weight loss  Hypokalemia replaced in ED on admission  Minimally elevated troponin, flat due to CKD likely and congestive heart failure no chest pain cardiology consulted  Abnormal EKG without chest pain cardiology consulted      Diet ADULT DIET; Regular; No Added Salt (3-4 gm); 2000 ml   DVT Prophylaxis [] Lovenox, []  Heparin, [] SCDs, [] Ambulation,  [] Eliquis, [] Xarelto  [] Coumadin   Code Status DNR-CCA             Personally reviewed Lab Studies and Imaging     Discussed management of the case with cardiology    Telemetry strip reviewed by myself A-fib    Imaging that was interpreted personally includes chest 
    V2.0    [facility] [place] Progress Note      Name:  Janae Perez /Age/Sex: 1945  (79 y.o. female)   MRN & CSN:  [id number] & [id number] Encounter Date/Time: 2024 12:00 PM EST   Location:  [id number]/5119-01 PCP: Boubacar Dhillon MD     Attending:Ward Davey MD       Hospital Day: 7    Assessment and Recommendations   Janae Perez is a 79 y.o. female who presents with Acute on chronic combined systolic (congestive) and diastolic (congestive) heart failure (HCC)      Plan:     Acute on chronic likely diastolic heart failure, started on Lasix 40 mg IV twice daily, creatinine improved to 1.4 on Lasix drip was started, creatinine improved this morning off Lasix drip no muscle cramps  Possible COPD exacerbation, p.o. prednisone DuoNeb, patient is not having productive cough   Chronic respiratory failure with hypoxia, uses oxygen at home with some worsening shortness of breath and increased need of oxygen due to above.  Generalized weakness PT OT continue to improve  A-fib continue on Xarelto.  Essential hypertension, continue p.o. medications uncontrolled on admission, continue to improve  Obesity, complicating current presentation increasing risk for morbidity mortality, counseled for weight loss  Minimally elevated troponin, flat due to CKD likely and congestive heart failure no chest pain cardiology consulted  Abnormal EKG without chest pain cardiology consulted  Possible UTI, started on cefepime yesterday urine culture pending      Diet ADULT DIET; Regular; Low Sodium (2 gm); 2000 ml   DVT Prophylaxis [] Lovenox, []  Heparin, [] SCDs, [] Ambulation,  [] Eliquis, [] Xarelto  [] Coumadin   Code Status DNR-CCA             Personally reviewed Lab Studies and Imaging     Discussed management of the case with case management over interdisciplinary wound          Drugs that require monitoring for toxicity include Lasix and the method of monitoring was creatinine    Medical Decision Making:  The following items were 
    V2.0    [facility] [place]      Name:  Janae Perez /Age/Sex: 1945  (79 y.o. female)   MRN & CSN:  [id number] & [id number] Encounter Date/Time: 2/15/2024 10:49 AM EST   Location:  [id number] PCP: Boubacar Dhillon MD     Attending:Ward Davey MD       Hospital Day: 3    Assessment and Recommendations   Janae Perez is a 79 y.o. female who presents with Acute on chronic combined systolic (congestive) and diastolic (congestive) heart failure (HCC)      Plan:     Acute on chronic likely combined systolic and diastolic heart failure, admitted to the hospital, started on Lasix 40 mg IV twice daily, creatinine increased to 1.8 this morning to note that Lasix was changed initially to 40 daily and currently changed to Lasix drip  Possible COPD exacerbation, p.o. prednisone DuoNeb, patient is not having productive cough we will keep holding on starting antibiotics at this time, to note that leukocytosis of 13.3 likely due to steroids  Chronic respiratory failure with hypoxia, uses oxygen at home with some worsening shortness of breath and increased need of oxygen due to above  Generalized weakness PT OT  A-fib continue on Xarelto  Essential hypertension, continue p.o. medications uncontrolled on admission, better controlled telemetry monitoring.  Obesity, complicating current presentation increasing risk for morbidity mortality, counseled for weight loss  Hypokalemia replaced in ED on admission  Minimally elevated troponin, flat due to CKD likely and congestive heart failure no chest pain cardiology consulted  Abnormal EKG without chest pain cardiology consulted      Diet ADULT DIET; Regular; Low Sodium (2 gm); 2000 ml   DVT Prophylaxis [] Lovenox, []  Heparin, [] SCDs, [] Ambulation,  [] Eliquis, [] Xarelto  [] Coumadin   Code Status DNR-CCA             Personally reviewed Lab Studies and Imaging     Discussed management of the case with cardiology    Telemetry strip reviewed by myself A-flushayer at 
  Nephrology Progress Note   [facility].[employer]      Reason for consultation: CKD 3b -- baseline 1.4-1.7 mg/dl. Follows Dr Obregon in office -- last seen in the office 23    Subjective:    The patient has been seen and examined. Labs and chart reviewed. Resting in recliner. Peripheral edema improving. On 2 L NC. There were not complications overnight.    Patient review of systems: Feeling better. Baseline SOB.       Allergies:  Allergies   Allergen Reactions    Morphine Rash     rash        Scheduled Meds:   cefepime  2,000 mg IntraVENous Once    Followed by    [START ON 2024] cefepime  1,000 mg IntraVENous Q24H    [START ON 2024] torsemide  40 mg Oral Daily    hydrALAZINE  25 mg Oral 2 times per day    allopurinol  100 mg Oral Daily    ipratropium 0.5 mg-albuterol 2.5 mg  1 Dose Inhalation BID RT    sodium chloride flush  5-40 mL IntraVENous 2 times per day    [Held by provider] lisinopril  5 mg Oral Daily    atorvastatin  40 mg Oral Daily    mometasone-formoterol  2 puff Inhalation BID RT    cloNIDine  0.1 mg Oral BID    dilTIAZem  120 mg Oral Daily    isosorbide mononitrate  120 mg Oral Daily    magnesium oxide  400 mg Oral Lunch    potassium chloride  40 mEq Oral Daily    lactobacillus  1 capsule Oral BID    rivaroxaban  15 mg Oral Daily with breakfast        sodium chloride         PRN Meds:LORazepam, sodium chloride flush, sodium chloride, ondansetron **OR** ondansetron, polyethylene glycol, acetaminophen **OR** acetaminophen, potassium chloride **OR** potassium alternative oral replacement **OR** potassium chloride, magnesium sulfate, perflutren lipid microspheres    Physical Exam:    TEMPERATURE:  Current - Temp: 97.5 °F (36.4 °C); Max - Temp  Av.5 °F (36.4 °C)  Min: 97.2 °F (36.2 °C)  Max: 97.8 °F (36.6 °C)  RESPIRATIONS RANGE: Resp  Av.3  Min: 18  Max: 26  PULSE RANGE: Pulse  Av.9  Min: 83  Max: 102  BLOOD PRESSURE RANGE:  Systolic (24hrs), Av , Min:146 , Max:182   ; Diastolic 
  Nephrology Progress Note   [employer]      Reason for consultation: CKD 3b -- baseline 1.4-1.7 mg/dl. Follows Dr Obregon in office -- last seen in the office 23    Subjective:    The patient has been seen and examined. Labs and chart reviewed.   In bed , not much change clinically .     Patient review of systems: Feels weak .   Stable LI/  SOB.       Allergies:  Allergies   Allergen Reactions    Morphine Rash     rash        Scheduled Meds:   cloNIDine  0.1 mg Oral TID    [START ON 2024] torsemide  20 mg Oral Daily    hydrALAZINE  50 mg Oral 3 times per day    cefepime  1,000 mg IntraVENous Q24H    allopurinol  100 mg Oral Daily    ipratropium 0.5 mg-albuterol 2.5 mg  1 Dose Inhalation BID RT    sodium chloride flush  5-40 mL IntraVENous 2 times per day    [Held by provider] lisinopril  5 mg Oral Daily    atorvastatin  40 mg Oral Daily    mometasone-formoterol  2 puff Inhalation BID RT    dilTIAZem  120 mg Oral Daily    isosorbide mononitrate  120 mg Oral Daily    magnesium oxide  400 mg Oral Lunch    lactobacillus  1 capsule Oral BID    rivaroxaban  15 mg Oral Daily with breakfast        sodium chloride         PRN Meds:hydrALAZINE, LORazepam, sodium chloride flush, sodium chloride, ondansetron **OR** ondansetron, polyethylene glycol, acetaminophen **OR** acetaminophen, potassium chloride **OR** potassium alternative oral replacement **OR** potassium chloride, magnesium sulfate, perflutren lipid microspheres    Physical Exam:    TEMPERATURE:  Current - Temp: 98 °F (36.7 °C); Max - Temp  Av.7 °F (36.5 °C)  Min: 97.4 °F (36.3 °C)  Max: 98 °F (36.7 °C)  RESPIRATIONS RANGE: Resp  Av.8  Min: 18  Max: 30  PULSE RANGE: Pulse  Av.8  Min: 100  Max: 109  BLOOD PRESSURE RANGE:  Systolic (24hrs), Av , Min:115 , Max:187   ; Diastolic (24hrs), Av, Min:62, Max:95    24HR INTAKE/OUTPUT:    Intake/Output Summary (Last 24 hours) at 2024 1327  Last data filed at 2024 4952  Gross per 
  Nephrology Progress Note   [email or web address]      Reason for consultation: CKD 3b -- baseline 1.4-1.7 mg/dl. Follows Dr Obregon in office -- last seen in the office 23    Subjective:    The patient has been seen and examined. Labs and chart reviewed. Resting in recliner. Peripheral edema improving. On 2 L NC. There were not complications overnight.    Patient review of systems: Feeling better. Baseline SOB.       Allergies:  Allergies   Allergen Reactions    Morphine Rash     rash        Scheduled Meds:   hydrALAZINE  25 mg Oral 2 times per day    allopurinol  100 mg Oral Daily    iron sucrose  200 mg IntraVENous Q24H    ipratropium 0.5 mg-albuterol 2.5 mg  1 Dose Inhalation BID RT    sodium chloride flush  5-40 mL IntraVENous 2 times per day    predniSONE  40 mg Oral Daily    [Held by provider] lisinopril  5 mg Oral Daily    atorvastatin  40 mg Oral Daily    mometasone-formoterol  2 puff Inhalation BID RT    cloNIDine  0.1 mg Oral BID    dilTIAZem  120 mg Oral Daily    isosorbide mononitrate  120 mg Oral Daily    magnesium oxide  400 mg Oral Lunch    potassium chloride  40 mEq Oral Daily    lactobacillus  1 capsule Oral BID    rivaroxaban  15 mg Oral Daily with breakfast        furosemide (LASIX) 100 mg in sodium chloride 0.9 % 100 mL infusion 4 mg/hr (24 0643)    sodium chloride         PRN Meds:LORazepam, sodium chloride flush, sodium chloride, ondansetron **OR** ondansetron, polyethylene glycol, acetaminophen **OR** acetaminophen, potassium chloride **OR** potassium alternative oral replacement **OR** potassium chloride, magnesium sulfate, perflutren lipid microspheres    Physical Exam:    TEMPERATURE:  Current - Temp: 98.2 °F (36.8 °C); Max - Temp  Av.9 °F (36.6 °C)  Min: 97.6 °F (36.4 °C)  Max: 98.2 °F (36.8 °C)  RESPIRATIONS RANGE: Resp  Av  Min: 16  Max: 30  PULSE RANGE: Pulse  Av  Min: 84  Max: 96  BLOOD PRESSURE RANGE:  Systolic (24hrs), Av , Min:130 , Max:158   ; Diastolic 
  Nephrology Progress Note   [email or web address]      Reason for consultation: CKD 3b -- baseline 1.4-1.7 mg/dl. Follows Dr Obregon in office -- last seen in the office 23    Subjective:    The patient has been seen and examined. Labs and chart reviewed. Resting in recliner. Peripheral edema improving. On 2 L NC. There were not complications overnight.    Patient review of systems: Feeling ok.  Stable LI/  SOB.       Allergies:  Allergies   Allergen Reactions    Morphine Rash     rash        Scheduled Meds:   cefepime  1,000 mg IntraVENous Q24H    torsemide  40 mg Oral Daily    hydrALAZINE  25 mg Oral 3 times per day    allopurinol  100 mg Oral Daily    ipratropium 0.5 mg-albuterol 2.5 mg  1 Dose Inhalation BID RT    sodium chloride flush  5-40 mL IntraVENous 2 times per day    [Held by provider] lisinopril  5 mg Oral Daily    atorvastatin  40 mg Oral Daily    mometasone-formoterol  2 puff Inhalation BID RT    cloNIDine  0.1 mg Oral BID    dilTIAZem  120 mg Oral Daily    isosorbide mononitrate  120 mg Oral Daily    magnesium oxide  400 mg Oral Lunch    potassium chloride  40 mEq Oral Daily    lactobacillus  1 capsule Oral BID    rivaroxaban  15 mg Oral Daily with breakfast        sodium chloride         PRN Meds:LORazepam, sodium chloride flush, sodium chloride, ondansetron **OR** ondansetron, polyethylene glycol, acetaminophen **OR** acetaminophen, potassium chloride **OR** potassium alternative oral replacement **OR** potassium chloride, magnesium sulfate, perflutren lipid microspheres    Physical Exam:    TEMPERATURE:  Current - Temp: 97.5 °F (36.4 °C); Max - Temp  Av.8 °F (36.6 °C)  Min: 97.4 °F (36.3 °C)  Max: 98.2 °F (36.8 °C)  RESPIRATIONS RANGE: Resp  Av.7  Min: 17  Max: 28  PULSE RANGE: Pulse  Av.8  Min: 94  Max: 110  BLOOD PRESSURE RANGE:  Systolic (24hrs), Av , Min:151 , Max:170   ; Diastolic (24hrs), Av, Min:70, Max:99    24HR INTAKE/OUTPUT:    Intake/Output Summary (Last 24 
  Nephrology Progress Note   [facility] [employer]      Reason for consultation: CKD 3b -- baseline 1.4-1.7 mg/dl. Follows Dr Obregon in office -- last seen in the office 23    Subjective:    The patient has been seen and examined. Labs and chart reviewed. Resting in bed. Peripheral edema improving but still present. On 2 L NC. Wt down ~ 3 kg overnight. Net negative 4.3 L since admission. Cr down to 1.4 mg/dL -- within baseline.    There were not complications overnight.    Patient review of systems: Feeling better. Baseline SOB.       Allergies:  Allergies   Allergen Reactions    Morphine Rash     rash        Scheduled Meds:   iron sucrose  200 mg IntraVENous Q24H    ipratropium 0.5 mg-albuterol 2.5 mg  1 Dose Inhalation BID RT    sodium chloride flush  5-40 mL IntraVENous 2 times per day    predniSONE  40 mg Oral Daily    [Held by provider] lisinopril  5 mg Oral Daily    atorvastatin  40 mg Oral Daily    mometasone-formoterol  2 puff Inhalation BID RT    cloNIDine  0.1 mg Oral BID    dilTIAZem  120 mg Oral Daily    isosorbide mononitrate  120 mg Oral Daily    magnesium oxide  400 mg Oral Lunch    potassium chloride  40 mEq Oral Daily    lactobacillus  1 capsule Oral BID    rivaroxaban  15 mg Oral Daily with breakfast        furosemide (LASIX) 100 mg in sodium chloride 0.9 % 100 mL infusion 4 mg/hr (24 0633)    sodium chloride         PRN Meds:LORazepam, sodium chloride flush, sodium chloride, ondansetron **OR** ondansetron, polyethylene glycol, acetaminophen **OR** acetaminophen, potassium chloride **OR** potassium alternative oral replacement **OR** potassium chloride, magnesium sulfate, perflutren lipid microspheres    Physical Exam:    TEMPERATURE:  Current - Temp: 97.8 °F (36.6 °C); Max - Temp  Av.1 °F (36.7 °C)  Min: 97.6 °F (36.4 °C)  Max: 98.6 °F (37 °C)  RESPIRATIONS RANGE: Resp  Av.3  Min: 18  Max: 30  PULSE RANGE: Pulse  Av.3  Min: 80  Max: 105  BLOOD PRESSURE RANGE:  Systolic (24hrs), 
4 Eyes Skin Assessment     NAME:  Janae Perez  YOB: 1945  MEDICAL RECORD NUMBER:  [id number]    The patient is being assessed for  Admission    I agree that at least one RN has performed a thorough Head to Toe Skin Assessment on the patient. ALL assessment sites listed below have been assessed.      Areas assessed by both nurses:    Head, Face, Ears, Shoulders, Back, Chest, Arms, Elbows, Hands, Sacrum. Buttock, Coccyx, Ischium, Legs. Feet and Heels, and Under Medical Devices         Does the Patient have a Wound? No noted wound(s)       Braydon Prevention initiated by RN: Yes  Wound Care Orders initiated by RN: No    Pressure Injury (Stage 3,4, Unstageable, DTI, NWPT, and Complex wounds) if present, place Wound referral order by RN under : No    New Ostomies, if present place, Ostomy referral order under : No     Nurse 1 eSignature: Electronically signed by Merissa Yepez RN on 2/14/24 at 5:02 AM EST    **SHARE this note so that the co-signing nurse can place an eSignature**    Nurse 2 eSignature: Electronically signed by Mary Barton RN on 2/14/24 at 5:38 AM EST     
Clinical Pharmacy Note  Dose Adjustment    Janae Perez is receiving cefepime for UTI. Based on the patient's Estimated Creatinine Clearance: Estimated Creatinine Clearance: 28 mL/min (A) (based on SCr of 1.9 mg/dL (H)). urine output and indication, the dose has been adjusted to 2000mg x 1 dose then 1000mg q24h per protocol.    Pharmacy will continue to monitor and adjust dose as needed for changes in renal function.    Charan Reddy ANJUM, 2/18/2024 1:40 PM    
Discharge orders acknowledged by RN . Discharge teaching completed with pt and family. AVS reviewed and all questions answered. Medication regimen reviewed and pt understands schedule. Follow up appointments also reviewed with pt and resources given for discharge. Pt was sent electronic to be filled and understands schedule. IV removed. Bedside monitor removed from pt. 60+ minutes of education completed. Required core measures completed. Pt vitals WDL. Pt discharged with all belongings to home with home care and . Pt transported off of unit via wheelchair. No complications.       Electronically signed by Dagmar Barton RN on 2/20/2024 at 6:22 PM    
Occupational Therapy      Janae Perez  [id number]  [id number]    Patient seated in recliner chair upon arrival to room. Declined completing mobility or transfers. Agreeable to therapeutic exercise. Completed 7 AROM ex with B UE's 10 reps to increase IND with functional mobility and transfers. HR 97 o2 sats 93% on 2L throughout AROM. Education provided on PLB during session. Patient declined further OT. If discharged prior to next OT session please see last daily note for discharge status.    Electronically signed by CANELO Cornejo on 2/19/2024 at 11:51 AM    OTR was consulted with this patients treatment/intervention plan.     Therapy Time     Individual Co-treatment   Time In 1135     Time Out 1150     Minutes 15         
Physical Therapy    Janae PRINCE Dukeelisa  1945  [id number]    PT attempted to see pt., but pt's  stated that pt. was hooked up to the Purewick and an antibiotic. He stated that they should be leaving today after the antibiotic to go home. Pt. and pt's  denied any concerns about mobility upon return home; pt's  stated that pt. has practiced using the RW and they have a ramp to get up the few steps into the home.    Will continue to follow up with pt. while she is in the hospital--she does not have a current D/C order--but if pt. discharges today, consider this the D/C summary and refer to the last progress note for updated progress towards goals.    Electronically signed by Melissa Sal PT on 2/20/2024 at 3:33 PM    
Physical Therapy  Facility/Department: [address] PROGRESSIVE CARE  Physical Therapy Initial Assessment    Name: Janae Perez  : 1945  MRN: [id number]  Date of Service: 2/15/2024    Discharge Recommendations:  Home with assist PRN, Patient would benefit from continued therapy after discharge, Home with Home health PT   PT Equipment Recommendations  Other: owns wh walker, WC and using home O2 at 3L prior to admission      Janae Perez scored a 18/24 on the AM-PAC short mobility form. Current research shows that an AM-PAC score of 18 or greater is typically associated with a discharge to the patient's home setting. Based on the patient's AM-PAC score and their current functional mobility deficits, it is recommended that the patient have 2-3 sessions per week of Physical Therapy at d/c to increase the patient's independence.  At this time, this patient demonstrates the endurance and safety to discharge home with home PT services and a follow up treatment frequency of 2-3x/wk.  Please see assessment section for further patient specific details.    If patient discharges prior to next session this note will serve as a discharge summary.  Please see below for the latest assessment towards goals.      Patient Diagnosis(es): The primary encounter diagnosis was Abnormal EKG. Diagnoses of Chronic obstructive pulmonary disease, unspecified COPD type (HCC) and Hypokalemia were also pertinent to this visit.  Past Medical History:  has a past medical history of AAA (abdominal aortic aneurysm) (HCC), AAA (abdominal aortic aneurysm) without rupture (HCC), Atrial fibrillation (HCC), CAD (coronary artery disease), CHF (congestive heart failure) (HCC), COPD (chronic obstructive pulmonary disease) (HCC), History of blood clots, Hyperlipidemia, and Hypertension.  Past Surgical History:  has a past surgical history that includes Colonoscopy (2007); Hysterectomy (); Appendectomy (); bladder suspension; Cataract 
Pt arrived to floor via stretcher from ED and transferred to bed.Bedside monitor activated. Patient oriented to room and use of call light. Call light and personal items within reach. Admission and assessment initiated. Education initiated and reviewed with patient.Denied further needs or questions at this time.    
Pt awakened for VS check.  BP noted 170's/90's-100's.   At Hs clonidine and hydralazine given with other scheduled medications.  BP remains high.  174/95.  Pt denies pain or discomfort.  Pt states she has been sleeping.  Secure message sent to hospitalist regarding elevated BP.  Await return message or orders.  Will continue to monitor.  Call light in reach and chair alarm on.    
Pt did not want to do respiratory treatments at this time. Pt wanted to sleep  
Pt. Lactic came back 2.6. prefect served on call hopitalist re-draw at 2pm ordered. No  further orders at this time.   
RN notified Nephrology at bedside of increase in Creatinine. Nephrology aware. RN also notified MD Petar via [employer] of patient's increased WBC count.  
2/20/2024 0952  Gross per 24 hour   Intake 630 ml   Output 2550 ml   Net -1920 ml         Patient Vitals for the past 96 hrs (Last 3 readings):   Weight   02/20/24 0328 102.7 kg (226 lb 6.6 oz)   02/19/24 0328 101.8 kg (224 lb 6.9 oz)   02/18/24 0617 102.2 kg (225 lb 5 oz)         GEN: Awake, Alert, requiring supplemental oxygen, obese  HEENT: Normocephalic, atraumatic  CHEST: distant breath sounds  CVS: RRR, no rubs, 1+ edema  ABD: soft, non tender, non distended  NEURO: No focal neurological deficit  PSYCH: O x 3    LAB DATA:    CBC:   Lab Results   Component Value Date/Time    WBC 14.6 02/20/2024 04:18 AM    RBC 3.48 02/20/2024 04:18 AM    RBC 4.19 04/13/2017 12:56 PM    HGB 10.5 02/20/2024 04:18 AM    HCT 31.6 02/20/2024 04:18 AM    MCV 90.9 02/20/2024 04:18 AM    MCH 30.1 02/20/2024 04:18 AM    MCHC 33.2 02/20/2024 04:18 AM    RDW 18.3 02/20/2024 04:18 AM     02/20/2024 04:18 AM    MPV 8.9 02/20/2024 04:18 AM     BMP:    Lab Results   Component Value Date/Time     02/20/2024 04:18 AM    K 3.6 02/20/2024 04:18 AM    K 3.2 02/13/2024 11:53 AM    CL 96 02/20/2024 04:18 AM    CO2 31 02/20/2024 04:18 AM    BUN 58 02/20/2024 04:18 AM    CREATININE 1.5 02/20/2024 04:18 AM    CALCIUM 8.9 02/20/2024 04:18 AM    GFRAA 53 10/13/2022 12:12 PM    GFRAA >60 06/25/2012 11:06 AM    LABGLOM 35 02/20/2024 04:18 AM    GLUCOSE 97 02/20/2024 04:18 AM    GLUCOSE 102 07/14/2016 01:03 PM     Ionized Calcium:  No results found for: \"IONCA\"  Magnesium:    Lab Results   Component Value Date/Time    MG 1.90 02/18/2024 04:24 AM     Phosphorus:    Lab Results   Component Value Date/Time    PHOS 4.1 02/20/2024 04:18 AM     U/A:    Lab Results   Component Value Date/Time    NITRITE neg 07/19/2014 08:36 AM    COLORU Yellow 02/18/2024 12:30 PM    PHUR 7.0 02/18/2024 12:30 PM    WBCUA 71 02/18/2024 12:30 PM    RBCUA 3 02/18/2024 12:30 PM    BACTERIA 4+ 02/18/2024 12:30 PM    CLARITYU CLOUDY 02/18/2024 12:30 PM    SPECGRAV 1.012 
but her endurance is lower than her norm.  She would benefit from cont OT to increase her independence with self care and functional mobility for d/c home with spouse.  Do not anticipate need for HHOT at d/c but will cont to assess.  Prognosis: Good  Decision Making: Medium Complexity  History: See above  Exam: mobility, self care  Assistance / Modification: RW  REQUIRES OT FOLLOW-UP: Yes  Activity Tolerance  Activity Tolerance: Patient limited by fatigue        Plan   Occupational Therapy Plan  Times Per Week: 3-5  Times Per Day: Once a day  Current Treatment Recommendations: Strengthening, Balance training, Functional mobility training, Safety education & training, Self-Care / ADL, Endurance training, Patient/Caregiver education & training, Home management training, Equipment evaluation, education, & procurement     Restrictions  Restrictions/Precautions  Restrictions/Precautions: Fall Risk    Subjective   General  Chart Reviewed: Yes, Orders, Progress Notes, History and Physical  Patient assessed for rehabilitation services?: Yes  Additional Pertinent Hx: Per Dr. Davey, \"79 y.o. female who presented to [facility] with worsening shortness of breath, patient stated that her worsening shortness of breath happening for couple or 3 months's but in the last few days got significantly worse, had to increase her oxygen at home associated with generalized weakness dry cough denies fever chills chest pain nausea vomiting or abdominal pain, also noted increased in lower extremity edema which is chronic but apparently getting worse in ED started on oxygen, given IV steroids, being admitted for further management and treatment, at this time no obvious aggravating or elevating factors of her shortness of breath ROM ambulation which makes it worse, no obvious trigger no change in medication does not smoke\"  Family / Caregiver Present: Yes ()  Referring Practitioner: Dr. Davey  Diagnosis: CHF and COPD 
   RBCUA 3 02/18/2024 12:30 PM    BACTERIA 4+ 02/18/2024 12:30 PM    CLARITYU CLOUDY 02/18/2024 12:30 PM    SPECGRAV 1.012 02/18/2024 12:30 PM    LEUKOCYTESUR LARGE 02/18/2024 12:30 PM    UROBILINOGEN 0.2 02/18/2024 12:30 PM    BILIRUBINUR Negative 02/18/2024 12:30 PM    BILIRUBINUR neg 07/19/2014 08:36 AM    BLOODU Negative 02/18/2024 12:30 PM    GLUCOSEU Negative 02/18/2024 12:30 PM         IMPRESSION/RECOMMENDATIONS:      CKD III b - baseline 1.4-1.7 mg/dl. Follows with Dr Obregon in office -- last seen 5/18/23  - Avoid Hypotension and exposure to Nephrotoxic agents  - No further work up warranted at this point in time  - Cr back within baseline @ 1.4 mg/dL s/p stopping lasix gtt and starting lower dose oral Torsemide (2/19)   - UMCR 156 mg     Volume overload   - In negative fluid balance   - Oral Torsemide     HFpEF              - TTE (7/2023): LVEF 55-60%. Grade 3 diastolic dysfx. Estimated PAP 51 mmHg.              - TTE 2/14/24 EF 65-70%; normal RV function; moderately increased left ventricular wall thickness               - FR 1.5 L and low Na+ diet              - Strict I/Os. Daily weights              - Agree with holding ACE until volume overload addressed                 ? COPD exacerbation: on 3 L NC @ baseline now on 2 L              - Management per pulmonology     Afib              - On AC     Hypertension              - BP trending down - increased frequency of Hydralazine 2/18/24   - Can increase dose of hydralazine if needed              - Avoid dropping BP too quickly     CKD-MBD              - Phosphorus at target     Anemia of CKD              - Hgb within goal              - B12/Folate adequate              - Iron sat 15 - s/p continue Venofer x3    Hyperuricemia   - Improving with Allopurinol     Will discuss with nephrology attending physician, Dr. Abbott.  See attestation for additional recommendations.      Khloe Dos Santos, CHRISTINE - CNP        
Date    TRIG 48 02/14/2024    TRIG 98 09/02/2023    TRIG 59 09/10/2021     Lab Results   Component Value Date    HDL 62 (H) 02/14/2024    HDL 57 09/02/2023    HDL 56 09/10/2021     Lab Results   Component Value Date    LDLCALC 65 02/14/2024    LDLCALC 43 09/02/2023    LDLCALC 39 09/10/2021     No components found for: \"LABVLDL\"      -----------------------------------------------------------------  Telemetry: personally reviewed     Echo: 8/3/2023 left ventricular cavity size is normal. There is mild concentric left   ventricular hypertrophy   Overall left ventricular systolic function appears normal with an ejection   fraction of 55-60%.   Diastolic filling parameters suggest grade III diastolic dysfunction.   The left atrium is mildly dilated.   Estimated pulmonary artery systolic pressure is moderately elevated at 51   mmHg assuming a right atrial pressure of 15 mmHg          Cath:Cath: 9/20/22 [place]     Findings:  Left Main  Distal non obstructive 30% disease, evaluated by IVUS MLA > 7.5 mm2   LAD  Moderate diffuse disease   Circ  Proximal vessel 75-80% stenosis correlating with positive functional study   Mid vessel non-obstructive disease 50%   RCA   with L-R collaterals         Interventions/Vessels  PCI performed to the proximal Lcx with DESx2   Resolute Mckay 3.5x22 mm   Resolute Mckay 3.5x8 mm   Excellent post angiographic result with BALWINDER 3 flow   Guides/Wires  XB 3.5 guide catheter   Terumo RunThrough   Balanced Heavy Weight   Devices  Opticross boston IVUS   Post % Stenosis  0   Closure Device  Perclose R CFA   Complications  None      Conclusion:   Successful PCI of proximal Lcx with DESx2  Plavix loaded in cath lab  Femoral artery perclosed with excellent hemostasis    Objective:   Vitals: BP (!) 164/100   Pulse 94   Temp 98 °F (36.7 °C) (Oral)   Resp 27   Ht 1.6 m (5' 2.99\")   Wt 105.2 kg (231 lb 14.8 oz)   SpO2 100%   BMI 41.09 kg/m²   General appearance: alert, appears stated age and 
38*   BUN 34* 46* 48*   CREATININE 1.6* 1.8* 1.4*   GLUCOSE 151* 126* 95     Hepatic: No results for input(s): \"AST\", \"ALT\", \"ALB\", \"BILITOT\", \"ALKPHOS\" in the last 72 hours.  Lipids:   Lab Results   Component Value Date/Time    CHOL 137 02/14/2024 04:34 AM    HDL 62 02/14/2024 04:34 AM    HDL 38 09/09/2011 03:35 PM    TRIG 48 02/14/2024 04:34 AM     Hemoglobin A1C:   Lab Results   Component Value Date/Time    LABA1C 5.4 01/11/2024 01:36 PM     TSH:   Lab Results   Component Value Date/Time    TSH 1.71 09/02/2023 01:16 AM     Troponin: No results found for: \"TROPONINT\"  Lactic Acid: No results for input(s): \"LACTA\" in the last 72 hours.  BNP:   Recent Labs     02/16/24  0644   PROBNP 11,814*     UA:  Lab Results   Component Value Date/Time    NITRU Negative 01/06/2024 10:42 AM    COLORU Yellow 01/06/2024 10:42 AM    PHUR 7.0 01/06/2024 10:42 AM    WBCUA 1 01/06/2024 10:42 AM    RBCUA 1 01/06/2024 10:42 AM    BACTERIA None Seen 01/06/2024 10:42 AM    CLARITYU Clear 01/06/2024 10:42 AM    SPECGRAV 1.008 01/06/2024 10:42 AM    LEUKOCYTESUR MODERATE 01/06/2024 10:42 AM    UROBILINOGEN 0.2 01/06/2024 10:42 AM    BILIRUBINUR Negative 01/06/2024 10:42 AM    BILIRUBINUR neg 07/19/2014 08:36 AM    BLOODU Negative 01/06/2024 10:42 AM    GLUCOSEU Negative 01/06/2024 10:42 AM    KETUA Negative 01/06/2024 10:42 AM     Urine Cultures:   Lab Results   Component Value Date/Time    LABURIN <10,000 CFU/ml  No further workup   11/02/2023 01:13 PM     Blood Cultures:   Lab Results   Component Value Date/Time    BC  02/14/2024 07:46 AM     No Growth to date.  Any change in status will be called.     Lab Results   Component Value Date/Time    BLOODCULT2  02/14/2024 07:31 AM     No Growth to date.  Any change in status will be called.     Organism:   Lab Results   Component Value Date/Time    ORG C. difficile toxin B gene detected 11/04/2023 08:34 AM         Electronically signed by Ward Davey MD on 2/16/2024 at 12:08 PM  Comment: 
Labs     02/16/24  0644 02/17/24  0435 02/18/24  0424    138 135*   K 3.8 3.5 3.6   CL 94* 91* 90*   CO2 38* 40* 35*   BUN 48* 47* 56*   CREATININE 1.4* 1.5* 1.9*   GLUCOSE 95 121* 133*     Hepatic: No results for input(s): \"AST\", \"ALT\", \"ALB\", \"BILITOT\", \"ALKPHOS\" in the last 72 hours.  Lipids:   Lab Results   Component Value Date/Time    CHOL 137 02/14/2024 04:34 AM    HDL 62 02/14/2024 04:34 AM    HDL 38 09/09/2011 03:35 PM    TRIG 48 02/14/2024 04:34 AM     Hemoglobin A1C:   Lab Results   Component Value Date/Time    LABA1C 5.4 01/11/2024 01:36 PM     TSH:   Lab Results   Component Value Date/Time    TSH 1.71 09/02/2023 01:16 AM     Troponin: No results found for: \"TROPONINT\"  Lactic Acid: No results for input(s): \"LACTA\" in the last 72 hours.  BNP:   Recent Labs     02/16/24  0644   PROBNP 11,814*     UA:  Lab Results   Component Value Date/Time    NITRU Negative 01/06/2024 10:42 AM    COLORU Yellow 01/06/2024 10:42 AM    PHUR 7.0 01/06/2024 10:42 AM    WBCUA 1 01/06/2024 10:42 AM    RBCUA 1 01/06/2024 10:42 AM    BACTERIA None Seen 01/06/2024 10:42 AM    CLARITYU Clear 01/06/2024 10:42 AM    SPECGRAV 1.008 01/06/2024 10:42 AM    LEUKOCYTESUR MODERATE 01/06/2024 10:42 AM    UROBILINOGEN 0.2 01/06/2024 10:42 AM    BILIRUBINUR Negative 01/06/2024 10:42 AM    BILIRUBINUR neg 07/19/2014 08:36 AM    BLOODU Negative 01/06/2024 10:42 AM    GLUCOSEU Negative 01/06/2024 10:42 AM    KETUA Negative 01/06/2024 10:42 AM     Urine Cultures:   Lab Results   Component Value Date/Time    LABURIN <10,000 CFU/ml  No further workup   11/02/2023 01:13 PM     Blood Cultures:   Lab Results   Component Value Date/Time    BC No Growth after 4 days of incubation. 02/14/2024 07:46 AM     Lab Results   Component Value Date/Time    BLOODCULT2 No Growth after 4 days of incubation. 02/14/2024 07:31 AM     Organism:   Lab Results   Component Value Date/Time    ORG C. difficile toxin B gene detected 11/04/2023 08:34 AM 
folate adequate     Will discuss with nephrology attending physician, Dr. Mar.  See attestation for additional recommendations.      Khloe Dos Santos, APRN - CNP

## 2024-02-20 NOTE — CARE COORDINATION
[facility]    DC order noted, all docs needed have been faxed to [facility] for home care services.    Home care to see patient by 2/22/24    Ray Duggan RN, BSN CTN  [facility] [phone]

## 2024-02-20 NOTE — PLAN OF CARE
Problem: Discharge Planning  Goal: Discharge to home or other facility with appropriate resources  2/14/2024 1213 by Jenn Suarez RN  Outcome: Progressing     Problem: Pain  Goal: Verbalizes/displays adequate comfort level or baseline comfort level  2/14/2024 1213 by Jenn Suarez RN  Outcome: Progressing     Problem: Respiratory - Adult  Goal: Achieves optimal ventilation and oxygenation  2/14/2024 1213 by Jenn Suarez RN  Outcome: Progressing     Problem: Cardiovascular - Adult  Goal: Maintains optimal cardiac output and hemodynamic stability  2/14/2024 1213 by Jenn Suarez RN  Outcome: Progressing     Problem: Cardiovascular - Adult  Goal: Absence of cardiac dysrhythmias or at baseline  2/14/2024 1213 by Jenn Suarez RN  Outcome: Progressing     Problem: Musculoskeletal - Adult  Goal: Return mobility to safest level of function  2/14/2024 1213 by Jenn Suarez RN  Outcome: Progressing     Problem: Musculoskeletal - Adult  Goal: Maintain proper alignment of affected body part  2/14/2024 1213 by Jenn Suarez RN  Outcome: Progressing     Problem: Musculoskeletal - Adult  Goal: Return ADL status to a safe level of function  2/14/2024 1213 by Jenn Suarez RN  Outcome: Progressing     Problem: Metabolic/Fluid and Electrolytes - Adult  Goal: Electrolytes maintained within normal limits  2/14/2024 1213 by Jenn Suarez RN  Outcome: Progressing     Problem: Metabolic/Fluid and Electrolytes - Adult  Goal: Hemodynamic stability and optimal renal function maintained  2/14/2024 1213 by Jenn Suarez RN  Outcome: Progressing     Problem: Metabolic/Fluid and Electrolytes - Adult  Goal: Glucose maintained within prescribed range  2/14/2024 1213 by Jenn Suarez RN  Outcome: Progressing     Problem: Hematologic - Adult  Goal: Maintains hematologic stability  2/14/2024 1213 by Jenn Suarez RN  Outcome: Progressing     Problem: Skin/Tissue Integrity  Goal: Absence of new skin 
  Problem: Discharge Planning  Goal: Discharge to home or other facility with appropriate resources  2/14/2024 2244 by Urszula Frazier RN  Outcome: Progressing  Flowsheets (Taken 2/14/2024 2120)  Discharge to home or other facility with appropriate resources: Identify barriers to discharge with patient and caregiver     Problem: Pain  Goal: Verbalizes/displays adequate comfort level or baseline comfort level  2/14/2024 2244 by Urszula Frazier RN  Outcome: Progressing  Flowsheets (Taken 2/14/2024 2244)  Verbalizes/displays adequate comfort level or baseline comfort level:   Encourage patient to monitor pain and request assistance   Assess pain using appropriate pain scale   Administer analgesics based on type and severity of pain and evaluate response   Implement non-pharmacological measures as appropriate and evaluate response     Problem: Respiratory - Adult  Goal: Achieves optimal ventilation and oxygenation  2/14/2024 2244 by Urszula Frazier RN  Outcome: Progressing  Flowsheets  Taken 2/14/2024 2244  Achieves optimal ventilation and oxygenation:   Assess for changes in respiratory status   Position to facilitate oxygenation and minimize respiratory effort     Problem: Cardiovascular - Adult  Goal: Maintains optimal cardiac output and hemodynamic stability  2/14/2024 2244 by Urszula Frazier RN  Outcome: Progressing  Flowsheets (Taken 2/14/2024 2120)  Maintains optimal cardiac output and hemodynamic stability: Monitor blood pressure and heart rate     Problem: Skin/Tissue Integrity  Goal: Absence of new skin breakdown  Description: 1.  Monitor for areas of redness and/or skin breakdown  2.  Assess vascular access sites hourly  3.  Every 4-6 hours minimum:  Change oxygen saturation probe site  4.  Every 4-6 hours:  If on nasal continuous positive airway pressure, respiratory therapy assess nares and determine need for appliance change or resting period.     Problem: Chronic Conditions and Co-morbidities  Goal: Patient's 
  Problem: Discharge Planning  Goal: Discharge to home or other facility with appropriate resources  2/15/2024 2336 by Urszula Frazier RN  Outcome: Progressing  Flowsheets (Taken 2/15/2024 2159)  Discharge to home or other facility with appropriate resources: Identify barriers to discharge with patient and caregiver     Problem: Pain  Goal: Verbalizes/displays adequate comfort level or baseline comfort level  2/15/2024 2336 by Urszula Frazier RN  Outcome: Progressing  Flowsheets (Taken 2/15/2024 2336)  Verbalizes/displays adequate comfort level or baseline comfort level:   Encourage patient to monitor pain and request assistance   Assess pain using appropriate pain scale   Implement non-pharmacological measures as appropriate and evaluate response   Administer analgesics based on type and severity of pain and evaluate response     Problem: Respiratory - Adult  Goal: Achieves optimal ventilation and oxygenation  2/15/2024 2336 by Urszula Frazier RN  Outcome: Progressing  Flowsheets  Taken 2/15/2024 2336  Achieves optimal ventilation and oxygenation:   Assess for changes in respiratory status   Position to facilitate oxygenation and minimize respiratory effort     Problem: Cardiovascular - Adult  Goal: Maintains optimal cardiac output and hemodynamic stability  2/15/2024 2336 by Urszula Frazier RN  Outcome: Progressing  Flowsheets  Taken 2/15/2024 2336  Maintains optimal cardiac output and hemodynamic stability: Monitor blood pressure and heart rate  Goal: Absence of cardiac dysrhythmias or at baseline  2/15/2024 2336 by Urszula Frazier RN  Outcome: Progressing  Flowsheets (Taken 2/15/2024 2159)  Absence of cardiac dysrhythmias or at baseline: Monitor cardiac rate and rhythm     
  Problem: Discharge Planning  Goal: Discharge to home or other facility with appropriate resources  2/17/2024 0030 by Urszula Frazier RN  Outcome: Progressing  Flowsheets  Taken 2/17/2024 0030  Discharge to home or other facility with appropriate resources: Identify barriers to discharge with patient and caregiver     Problem: Pain  Goal: Verbalizes/displays adequate comfort level or baseline comfort level  2/17/2024 0030 by Urszula Frazier RN  Outcome: Progressing  Flowsheets (Taken 2/17/2024 0030)  Verbalizes/displays adequate comfort level or baseline comfort level:   Encourage patient to monitor pain and request assistance   Assess pain using appropriate pain scale   Administer analgesics based on type and severity of pain and evaluate response   Implement non-pharmacological measures as appropriate and evaluate response    Problem: Respiratory - Adult  Goal: Achieves optimal ventilation and oxygenation  2/17/2024 0030 by Urszula Frazier RN  Outcome: Progressing  Flowsheets  Taken 2/17/2024 0030  Achieves optimal ventilation and oxygenation:   Assess for changes in respiratory status   Assess for changes in mentation and behavior   Position to facilitate oxygenation and minimize respiratory effort     Problem: Cardiovascular - Adult  Goal: Maintains optimal cardiac output and hemodynamic stability  2/17/2024 0030 by Urszula Frazier RN  Outcome: Progressing  Flowsheets (Taken 2/16/2024 2002)  Maintains optimal cardiac output and hemodynamic stability: Monitor blood pressure and heart rate     Problem: Musculoskeletal - Adult  Goal: Return mobility to safest level of function  Recent Flowsheet Documentation  Taken 2/16/2024 2002 by Urszula Frazier, RN  Return Mobility to Safest Level of Function: Assess patient stability and activity tolerance for standing, transferring and ambulating with or without assistive devices  
  Problem: Discharge Planning  Goal: Discharge to home or other facility with appropriate resources  Outcome: Progressing     Problem: Pain  Goal: Verbalizes/displays adequate comfort level or baseline comfort level  Outcome: Progressing     Problem: Respiratory - Adult  Goal: Achieves optimal ventilation and oxygenation  Outcome: Progressing  Flowsheets (Taken 2/18/2024 0835 by Amarilys Flores, RN)  Achieves optimal ventilation and oxygenation: Assess for changes in respiratory status     Problem: Cardiovascular - Adult  Goal: Maintains optimal cardiac output and hemodynamic stability  Outcome: Progressing  Flowsheets (Taken 2/18/2024 0835 by Amarilys Flores RN)  Maintains optimal cardiac output and hemodynamic stability: Monitor blood pressure and heart rate  Goal: Absence of cardiac dysrhythmias or at baseline  Outcome: Progressing  Flowsheets (Taken 2/18/2024 0835 by Amarilys Flores RN)  Absence of cardiac dysrhythmias or at baseline: Monitor cardiac rate and rhythm     Problem: Musculoskeletal - Adult  Goal: Return mobility to safest level of function  Outcome: Progressing  Goal: Maintain proper alignment of affected body part  Outcome: Progressing  Goal: Return ADL status to a safe level of function  Outcome: Progressing     Problem: Metabolic/Fluid and Electrolytes - Adult  Goal: Electrolytes maintained within normal limits  Outcome: Progressing  Goal: Hemodynamic stability and optimal renal function maintained  Outcome: Progressing  Goal: Glucose maintained within prescribed range  Outcome: Progressing     Problem: Hematologic - Adult  Goal: Maintains hematologic stability  Outcome: Progressing     Problem: Skin/Tissue Integrity  Goal: Absence of new skin breakdown  Description: 1.  Monitor for areas of redness and/or skin breakdown  2.  Assess vascular access sites hourly  3.  Every 4-6 hours minimum:  Change oxygen saturation probe site  4.  Every 4-6 hours:  If on nasal continuous positive 
  Problem: Discharge Planning  Goal: Discharge to home or other facility with appropriate resources  Outcome: Progressing  Flowsheets (Taken 2/15/2024 1000)  Discharge to home or other facility with appropriate resources:   Identify barriers to discharge with patient and caregiver   Arrange for needed discharge resources and transportation as appropriate   Identify discharge learning needs (meds, wound care, etc)     Problem: Pain  Goal: Verbalizes/displays adequate comfort level or baseline comfort level  Outcome: Progressing  Flowsheets (Taken 2/15/2024 1422)  Verbalizes/displays adequate comfort level or baseline comfort level:   Encourage patient to monitor pain and request assistance   Administer analgesics based on type and severity of pain and evaluate response   Assess pain using appropriate pain scale     Problem: Respiratory - Adult  Goal: Achieves optimal ventilation and oxygenation  Outcome: Progressing  Flowsheets  Taken 2/15/2024 1422  Achieves optimal ventilation and oxygenation:   Assess for changes in respiratory status   Position to facilitate oxygenation and minimize respiratory effort   Assess for changes in mentation and behavior   Oxygen supplementation based on oxygen saturation or arterial blood gases  Taken 2/15/2024 1000  Achieves optimal ventilation and oxygenation:   Assess for changes in respiratory status   Assess for changes in mentation and behavior   Position to facilitate oxygenation and minimize respiratory effort   Oxygen supplementation based on oxygen saturation or arterial blood gases     Problem: Cardiovascular - Adult  Goal: Maintains optimal cardiac output and hemodynamic stability  Outcome: Progressing  Flowsheets  Taken 2/15/2024 1422  Maintains optimal cardiac output and hemodynamic stability: Monitor blood pressure and heart rate  Taken 2/15/2024 1000  Maintains optimal cardiac output and hemodynamic stability: Monitor blood pressure and heart rate  Goal: Absence of 
  Problem: Discharge Planning  Goal: Discharge to home or other facility with appropriate resources  Outcome: Progressing  Flowsheets (Taken 2/19/2024 1937)  Discharge to home or other facility with appropriate resources: Identify barriers to discharge with patient and caregiver     Problem: Pain  Goal: Verbalizes/displays adequate comfort level or baseline comfort level  Outcome: Progressing     Problem: Respiratory - Adult  Goal: Achieves optimal ventilation and oxygenation  Outcome: Progressing     Problem: Cardiovascular - Adult  Goal: Maintains optimal cardiac output and hemodynamic stability  Outcome: Progressing  Flowsheets (Taken 2/19/2024 1937)  Maintains optimal cardiac output and hemodynamic stability: Monitor blood pressure and heart rate     Problem: Musculoskeletal - Adult  Goal: Return mobility to safest level of function  Outcome: Progressing  Flowsheets (Taken 2/19/2024 1937)  Return Mobility to Safest Level of Function: Assess patient stability and activity tolerance for standing, transferring and ambulating with or without assistive devices  Goal: Maintain proper alignment of affected body part  Outcome: Progressing  Flowsheets (Taken 2/19/2024 1937)  Maintain proper alignment of affected body part: Support and protect limb and body alignment per provider's orders  Goal: Return ADL status to a safe level of function  Outcome: Progressing  Flowsheets (Taken 2/19/2024 1937)  Return ADL Status to a Safe Level of Function: Administer medication as ordered     Problem: Metabolic/Fluid and Electrolytes - Adult  Goal: Electrolytes maintained within normal limits  Outcome: Progressing  Flowsheets (Taken 2/19/2024 1937)  Electrolytes maintained within normal limits: Monitor labs and assess patient for signs and symptoms of electrolyte imbalances  Goal: Hemodynamic stability and optimal renal function maintained  Outcome: Progressing  Flowsheets (Taken 2/19/2024 1937)  Hemodynamic stability and optimal 
nasal continuous positive airway pressure, respiratory therapy assess nares and determine need for appliance change or resting period.  Outcome: Progressing     Problem: Safety - Adult  Goal: Free from fall injury  Outcome: Progressing  Flowsheets (Taken 2/14/2024 0134)  Free From Fall Injury:   Instruct family/caregiver on patient safety   Based on caregiver fall risk screen, instruct family/caregiver to ask for assistance with transferring infant if caregiver noted to have fall risk factors     Problem: ABCDS Injury Assessment  Goal: Absence of physical injury  Outcome: Progressing  Flowsheets (Taken 2/14/2024 0134)  Absence of Physical Injury: Implement safety measures based on patient assessment     
demonstrate effective coping: Assist patient/family to identify coping skills, available support systems and cultural and spiritual values     Problem: Decision Making  Goal: Pt/Family able to effectively weigh alternatives and participate in decision making related to treatment and care  Description: INTERVENTIONS:  1. Determine when there are differences between patient's view, family's view, and healthcare provider's view of condition  2. Facilitate patient and family articulation of goals for care  3. Help patient and family identify pros/cons of alternative solutions  4. Provide information as requested by patient/family  5. Respect patient/family right to receive or not to receive information  6. Serve as a liaison between patient and family and health care team  7. Initiate Consults from Ethics, Palliative Care or initiate Family Care Conference as is appropriate  Outcome: Adequate for Discharge  Flowsheets (Taken 2/20/2024 0305 by Lesley Dhaliwal RN)  Patient/family able to effectively weigh alternatives and participate in decision making related to treatment and care: Determine when there are differences between patient's view, family's view, and healthcare provider's view of condition     Problem: Confusion  Goal: Confusion, delirium, dementia, or psychosis is improved or at baseline  Description: INTERVENTIONS:  1. Assess for possible contributors to thought disturbance, including medications, impaired vision or hearing, underlying metabolic abnormalities, dehydration, psychiatric diagnoses, and notify attending LIP  2. [place] high risk fall precautions, as indicated  3. Provide frequent short contacts to provide reality reorientation, refocusing and direction  4. Decrease environmental stimuli, including noise as appropriate  5. Monitor and intervene to maintain adequate nutrition, hydration, elimination, sleep and activity  6. If unable to ensure safety without constant attention obtain sitter and 
condition     Problem: Confusion  Goal: Confusion, delirium, dementia, or psychosis is improved or at baseline  Description: INTERVENTIONS:  1. Assess for possible contributors to thought disturbance, including medications, impaired vision or hearing, underlying metabolic abnormalities, dehydration, psychiatric diagnoses, and notify attending LIP  2. [place] high risk fall precautions, as indicated  3. Provide frequent short contacts to provide reality reorientation, refocusing and direction  4. Decrease environmental stimuli, including noise as appropriate  5. Monitor and intervene to maintain adequate nutrition, hydration, elimination, sleep and activity  6. If unable to ensure safety without constant attention obtain sitter and review sitter guidelines with assigned personnel  7. Initiate Psychosocial CNS and Spiritual Care consult, as indicated  Outcome: Progressing  Flowsheets (Taken 2/17/2024 0820 by Amarilys Flores RN)  Effect of thought disturbance (confusion, delirium, dementia, or psychosis) are managed with adequate functional status: Assess for contributors to thought disturbance, including medications, impaired vision or hearing, underlying metabolic abnormalities, dehydration, psychiatric diagnoses, notify LIP     Problem: Behavior  Goal: Pt/Family maintain appropriate behavior and adhere to behavioral management agreement, if implemented  Description: INTERVENTIONS:  1. Assess patient/family's coping skills and  non-compliant behavior (including use of illegal substances)  2. Notify security of behavior or suspected illegal substances which indicate the need for search of the family and/or belongings  3. Encourage verbalization of thoughts and concerns in a socially appropriate manner  4. Utilize positive, consistent limit setting strategies supporting safety of patient, staff and others  5. Encourage participation in the decision making process about the behavioral management agreement  6. If 
patient/family's coping skills and  non-compliant behavior (including use of illegal substances)  2. Notify security of behavior or suspected illegal substances which indicate the need for search of the family and/or belongings  3. Encourage verbalization of thoughts and concerns in a socially appropriate manner  4. Utilize positive, consistent limit setting strategies supporting safety of patient, staff and others  5. Encourage participation in the decision making process about the behavioral management agreement  6. If a visitor's behavior poses a threat to safety call refer to organization policy.  7. Initiate consult with , Psychosocial CNS, Spiritual Care as appropriate  Outcome: Progressing  Flowsheets (Taken 2/16/2024 6061)  Patient/family maintains appropriate behavior and adheres to behavioral management agreement, if implemented: Assess patient/family’s coping skills and  non-compliant behavior (including use of illegal substances)

## 2024-02-20 NOTE — DISCHARGE SUMMARY
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    Hospital Course:     From HPI:\"   79 y.o. female who presented to [facility] with worsening shortness of breath, patient stated that her worsening shortness of breath happening for couple or 3 months's but in the last few days got significantly worse, had to increase her oxygen at home associated with generalized weakness dry cough denies fever chills chest pain nausea vomiting or abdominal pain, also noted increased in lower extremity edema which is chronic but apparently getting worse in ED started on oxygen, given IV steroids, being admitted for further management and treatment, at this time no obvious aggravating or elevating factors of her shortness of breath ROM ambulation which makes it worse, no obvious trigger no change in medication does not smoke\"         Acute on chronic likely diastolic heart failure, started on Lasix 40 mg IV twice daily, creatinine improved to 1.4 on Lasix drip was started, creatinine improved this morning off Lasix drip no muscle cramps changing to torsemide on discharge follow-up with cardiology follow-up with nephrology  Possible COPD exacerbation, p.o. prednisone DuoNeb, patient is not having productive cough   Chronic respiratory failure with hypoxia, uses oxygen at home with some worsening shortness of breath and

## 2024-02-20 NOTE — CARE COORDINATION
DISCHARGE PLANNING:    Per chart review and rounds, currently awaiting urine culture to decide on antibiotic therapy.  Dc plan to return home with .  Active with [facility].  Oxygen through [facility].      #[phone]  Electronically signed by Belia Santana RN on 2/20/2024 at 3:14 PM

## 2024-02-20 NOTE — NURSE NAVIGATOR
Discharge order noted. Pt has a follow up appointment in place with   Amanda Weeks NP 2/23 @ 2pm  She has discharge instructions in place on her AVS and TOMEKA.  D/c weight 226 lbs standing

## 2024-02-21 ENCOUNTER — CARE COORDINATION (OUTPATIENT)
Dept: CASE MANAGEMENT | Age: 79
End: 2024-02-21

## 2024-02-21 LAB
BACTERIA UR CULT: ABNORMAL
BACTERIA UR CULT: ABNORMAL
ORGANISM: ABNORMAL

## 2024-02-21 NOTE — CARE COORDINATION
Care Transitions Initial Follow Up Call    Call within 2 business days of discharge: Yes        Patient: Janae Perez Patient : 1945   MRN: [id number]  Reason for Admission: SOB  Discharge Date: 24 RARS: Readmission Risk Score: 27.8      Last Discharge Facility       Date Complaint Diagnosis Description Type Department Provider    24 Shortness of Breath Abnormal EKG ... ED to Hosp-Admission (Discharged) (ADMITTED) [facility] Ward Davey MD; Charles Luciano...            Was this an external facility discharge? No Discharge Facility: [facility]    Challenges to be reviewed by the provider   Additional needs identified to be addressed with provider: No                 Method of communication with provider: none.    Initial attempt at CT discharge phone call. Unable to reach patient. Left message. Contact info provided. Requested return call to CTN.    Call placed to [facility]. Spoke with the intake team. They state they have the patient referral.                 Follow Up  Future Appointments   Date Time Provider Department Center   2024  2:00 PM Tita Weeks APRN - CNP [facility]     Sera Shankar RN BSN  Care Transition Nurse  [phone]

## 2024-02-22 ENCOUNTER — CARE COORDINATION (OUTPATIENT)
Dept: CASE MANAGEMENT | Age: 79
End: 2024-02-22

## 2024-02-22 ENCOUNTER — CARE COORDINATION (OUTPATIENT)
Dept: PRIMARY CARE CLINIC | Age: 79
End: 2024-02-22

## 2024-02-22 ENCOUNTER — TELEPHONE (OUTPATIENT)
Dept: CARDIOLOGY CLINIC | Age: 79
End: 2024-02-22

## 2024-02-22 ENCOUNTER — TELEPHONE (OUTPATIENT)
Dept: FAMILY MEDICINE CLINIC | Age: 79
End: 2024-02-22

## 2024-02-22 DIAGNOSIS — J44.9 COPD, SEVERE (HCC): ICD-10-CM

## 2024-02-22 DIAGNOSIS — I50.22 CHRONIC SYSTOLIC (CONGESTIVE) HEART FAILURE (HCC): Primary | ICD-10-CM

## 2024-02-22 RX ORDER — HYDROXYZINE PAMOATE 25 MG/1
25 CAPSULE ORAL DAILY PRN
Qty: 30 CAPSULE | Refills: 0 | Status: SHIPPED | OUTPATIENT
Start: 2024-02-22

## 2024-02-22 NOTE — TELEPHONE ENCOUNTER
Called patient.  She does have Cardio mems  pillow at home.  She and her  will do them again.  I offered to talk to her  and relay the same message.  Patient said \"No that is OK, I will tell him.\"

## 2024-02-22 NOTE — TELEPHONE ENCOUNTER
Patient hospitalized for GI bleed. Taking Xarelto 15 mg daily. Patient has received blood transfusions.    Patient is too frail to be considered for watchman therapy    Patient taking torsemide 20 mg daily.    Please advise.

## 2024-02-22 NOTE — PROGRESS NOTES
Remote Patient Monitoring Treatment Plan    Received request from ACM/CTSera Liu RN  to order remote patient monitoring for in home monitoring of CHF; Condition managed by Dr. Dejuan Lopez ([facility]).  COPD; Condition managed by Dr. Leland Blake DO ([facility]).  and order completed.     Patient will be monitoring blood pressure   pulse ox   weight  survey questions.      Patient will engage in Remote Patient Monitoring each day to develop the skills necessary for self management.       RPM Care Team Responsibilities:   Alerts will be reviewed daily and addressed within 2-4 hours during operational hours (Monday -Friday 9 am-4 pm)  Alert response and intervention documented in patient medical record  Alert response escalated to PCP per protocol and documented in patient medical record  Patient monitored over approximately  days  Discharge from program based on self-management readiness    See care coordination encounters for additional details.

## 2024-02-22 NOTE — CARE COORDINATION
Received staff message from the RPM team that patient was c/o increased SOB. Writer placed call to Janae. Spoke with Janae. She is wearing her home oxygen. She is able to speak in complete sentences. Her O2 sat while writer was on the phone = 99%.    Spoke with Linda. He states Janae is very anxious. He states she has the cardiomems monitoring but has not used it in some time due to not being able to get to and lie down on the pillow.    Writer asked about calling 's office or EMS. Janae in the background told her  she will wait to see the staff at [facility] tomorrow. She does not want to return to the ED,    Writer encouraged Linda to call EMS if her condition changes or worsens. He verbalized understanding.    Sera Shankar RN BSN  Care Transition Nurse  [phone]

## 2024-02-22 NOTE — CARE COORDINATION
Care Transitions Initial Follow Up Call    Call within 2 business days of discharge: Yes    Patient Current Location:  Home: [address] [place] [address]    Care Transition Nurse contacted the patient by telephone to perform post hospital discharge assessment. Verified name and  with patient as identifiers. Provided introduction to self, and explanation of the Care Transition Nurse role.     Patient: Janae Perze Patient : 1945   MRN: [id number]  Reason for Admission: SOB  Discharge Date: 24 RARS: Readmission Risk Score: 27.8      Last Discharge Facility       Date Complaint Diagnosis Description Type Department Provider    24 Shortness of Breath Abnormal EKG ... ED to Hosp-Admission (Discharged) (ADMITTED) [facility] 5W Ward Davey MD; Charles Luciano...            Was this an external facility discharge? No Discharge Facility: [facility]    Challenges to be reviewed by the provider   Additional needs identified to be addressed with provider: No                 Method of communication with provider: none.    2nd attempt at CT discharge phone call. Janae states she is doing \"alright.\" She states [facility] nurse was at the home this morning. Janae states she is using home oxygen therapy continuously at 2lpm. She states she does have a pulse oximeter at home. She states her most recent O2 sat = 100%.  She states her SOB is at baseline. She denies any cough at this time. She states she does have a cpap machine at home and uses it on a regular basis. Janae confirmed her appt with [facility] tomorrow and states her  will provide her transportation. Janae states she did check her B/P this morning but does not remember the results. She states her weight today = 230.0lbs. She states she did not weigh herself yesterday. Janae denies any chest pain at this time. She states she does have edema in her feet - she is keeping them elevated - she states this is about the same as when she

## 2024-02-22 NOTE — TELEPHONE ENCOUNTER
Aure mckeon/ [facility]  wants to know if Dr MACIAS would sign off on home health orders for PT/OT/nursing? Pt is asking for a rx for a anxiety med. Pt was given an anxiety med while in the hospital. Pt has edema but Aure will be contacting Dr Lopez's office about this.

## 2024-02-22 NOTE — CARE COORDINATION
RPM Kit Order    Remote Patient Kit Ordering Note      Date/Time:  2/22/2024 1:46 PM      CCSS placed phone call to patient/family today to notify of RPM kit order; patient/family was available; discussed the following topics below and all questions answered.    [x] CCSS confirmed patient shipping address  [x] Patient will receive package over the next 1-3 business days. Someone 21 years or older must be present to sign for UPS delivery.  [x] HRS will contact patient within 24 hours, an HRS  will call the patient directly: If the patient does not answer, HRS will follow up with the clinical team notifying them about the unsuccessful attempt to contact the patient. HRS will make three call attempts to the patient.Provide patient with [facility] Virtual install number is: [phone].  [x] CTN will contact patient once equipment is active to welcome them to the program.                                                         [x] Hours of RPM monitoring - Monday-Friday 8301-2181; encourage patient to get vitals entered by Noon each day to have the alert addressed same day.  [x][facility] patient.                      CTN made aware the RPM kit has been ordered.     
102

## 2024-02-22 NOTE — TELEPHONE ENCOUNTER
Aure, Janae's home health nurse, called into the office to inform that Janae has a lot of edema. Janae's  also has been giving Janae 40MG of torsemide but per discharge from hospital, Janae was prescribed 20MG, Aure would like to know if that is correct?    Aure also shares that at hospital discharge, Janae weighed 226 LBS. Now at home, she is weighing at 230 LBS.     Please assist.    Aure's callback: [phone].

## 2024-02-22 NOTE — TELEPHONE ENCOUNTER
Lat measurement 2/19.   Last I knew, Janae reported that the ECF lost her pillow.   If willing to start doing them and the ECF agrees to help then we can check about getting a new pillow.

## 2024-02-22 NOTE — TELEPHONE ENCOUNTER
CHANDA Patel & Kimberley NP     See below messages and advise any cardiac recs prior to tomorrows HFU? Thanks.     Side note: they are comparing hospital scale to home scale.

## 2024-02-22 NOTE — TELEPHONE ENCOUNTER
Linda stopped in to see if you would give her something for anxiety , she was up all night last night  Theresa Chaves

## 2024-02-22 NOTE — TELEPHONE ENCOUNTER
I sent in vistaril   Orders Placed This Encounter   Medications    hydrOXYzine pamoate (VISTARIL) 25 MG capsule     Sig: Take 1 capsule by mouth daily as needed for Anxiety     Dispense:  30 capsule     Refill:  0

## 2024-02-22 NOTE — TELEPHONE ENCOUNTER
Crystal,   Please call HHN back to get a normal CHF assessment, vitals, daily weight for CHANDA Chu/CHANDA Patel further review as she was just discharged 2 days ago 2/20/24.    Also, she is a Cardiomems heart failure device patient. She has not completed a download on her wedge pillow since she was discharged home. The HHN and  should be assisting Janae in completing those daily.     See here discharge med list torsemide 20 mg daily.

## 2024-02-22 NOTE — TELEPHONE ENCOUNTER
Called spoke home nurse Aure     Weight 230 lb this morning  4 lb increase since discharge on 2/20/24.    Edema in feet and ankle and up to her calves, little worse since hospital  Aure states patient feet are really puffy.    B/P 122/70's      Per discharge-patient was given torsemide 40 mg daily, need clarification on how to take torsemide.     Lungs are clear no other CHF symptoms    Aure states there was no mention on completing cardiomems.   I called patient unable to leave message no voicemail set up.    Please advise.

## 2024-02-22 NOTE — TELEPHONE ENCOUNTER
She needs to be doing CardioMems daily  The last reading in hospital was at her goal of 20    We can adjust medications based on her readings

## 2024-02-23 ENCOUNTER — CARE COORDINATION (OUTPATIENT)
Dept: CASE MANAGEMENT | Age: 79
End: 2024-02-23

## 2024-02-23 ENCOUNTER — OFFICE VISIT (OUTPATIENT)
Dept: CARDIOLOGY CLINIC | Age: 79
End: 2024-02-23

## 2024-02-23 ENCOUNTER — TELEPHONE (OUTPATIENT)
Dept: FAMILY MEDICINE CLINIC | Age: 79
End: 2024-02-23

## 2024-02-23 VITALS
DIASTOLIC BLOOD PRESSURE: 76 MMHG | OXYGEN SATURATION: 97 % | SYSTOLIC BLOOD PRESSURE: 136 MMHG | HEART RATE: 76 BPM | HEIGHT: 63 IN | WEIGHT: 230 LBS | BODY MASS INDEX: 40.75 KG/M2

## 2024-02-23 DIAGNOSIS — N17.9 ACUTE KIDNEY INJURY SUPERIMPOSED ON CKD (HCC): ICD-10-CM

## 2024-02-23 DIAGNOSIS — I48.0 PAF (PAROXYSMAL ATRIAL FIBRILLATION) (HCC): ICD-10-CM

## 2024-02-23 DIAGNOSIS — I50.33 ACUTE ON CHRONIC DIASTOLIC HEART FAILURE (HCC): Primary | ICD-10-CM

## 2024-02-23 DIAGNOSIS — I25.118 ATHEROSCLEROTIC HEART DISEASE OF NATIVE CORONARY ARTERY WITH OTHER FORMS OF ANGINA PECTORIS (HCC): ICD-10-CM

## 2024-02-23 DIAGNOSIS — N18.9 ACUTE KIDNEY INJURY SUPERIMPOSED ON CKD (HCC): ICD-10-CM

## 2024-02-23 DIAGNOSIS — E66.01 SEVERE OBESITY (BMI 35.0-39.9) WITH COMORBIDITY (HCC): ICD-10-CM

## 2024-02-23 RX ORDER — TORSEMIDE 20 MG/1
20 TABLET ORAL 3 TIMES DAILY
Qty: 90 TABLET | Refills: 3 | Status: SHIPPED | OUTPATIENT
Start: 2024-02-23

## 2024-02-23 NOTE — TELEPHONE ENCOUNTER
Aure with [facility] called requesting verbal orders for patient.  Phone: [phone]  Home Health orders for nursing, OT, and PT. Please call for verbal approval before faxed order is sent over to be signed.   Message routed to Dr. Dhillon to advise.

## 2024-02-23 NOTE — CARE COORDINATION
Upon chart review it is noted that Janae has been in contact with [facility] and her PCP office. Janae has an appt this afternoon with [facility]. CT team will follow.    Sera Shankar RN BSN  Care Transition Nurse  [phone]

## 2024-02-26 ENCOUNTER — OFFICE VISIT (OUTPATIENT)
Dept: FAMILY MEDICINE CLINIC | Age: 79
End: 2024-02-26
Payer: MEDICARE

## 2024-02-26 VITALS
TEMPERATURE: 100 F | HEIGHT: 63 IN | BODY MASS INDEX: 40.74 KG/M2 | HEART RATE: 92 BPM | DIASTOLIC BLOOD PRESSURE: 74 MMHG | SYSTOLIC BLOOD PRESSURE: 128 MMHG

## 2024-02-26 DIAGNOSIS — I50.42 CHRONIC COMBINED SYSTOLIC AND DIASTOLIC HEART FAILURE (HCC): ICD-10-CM

## 2024-02-26 DIAGNOSIS — N39.0 URINARY TRACT INFECTION WITHOUT HEMATURIA, SITE UNSPECIFIED: Primary | ICD-10-CM

## 2024-02-26 PROCEDURE — 1090F PRES/ABSN URINE INCON ASSESS: CPT | Performed by: FAMILY MEDICINE

## 2024-02-26 PROCEDURE — 1036F TOBACCO NON-USER: CPT | Performed by: FAMILY MEDICINE

## 2024-02-26 PROCEDURE — 3074F SYST BP LT 130 MM HG: CPT | Performed by: FAMILY MEDICINE

## 2024-02-26 PROCEDURE — G8484 FLU IMMUNIZE NO ADMIN: HCPCS | Performed by: FAMILY MEDICINE

## 2024-02-26 PROCEDURE — 99213 OFFICE O/P EST LOW 20 MIN: CPT | Performed by: FAMILY MEDICINE

## 2024-02-26 PROCEDURE — 1123F ACP DISCUSS/DSCN MKR DOCD: CPT | Performed by: FAMILY MEDICINE

## 2024-02-26 PROCEDURE — 3078F DIAST BP <80 MM HG: CPT | Performed by: FAMILY MEDICINE

## 2024-02-26 PROCEDURE — G8399 PT W/DXA RESULTS DOCUMENT: HCPCS | Performed by: FAMILY MEDICINE

## 2024-02-26 PROCEDURE — G8417 CALC BMI ABV UP PARAM F/U: HCPCS | Performed by: FAMILY MEDICINE

## 2024-02-26 PROCEDURE — G8427 DOCREV CUR MEDS BY ELIG CLIN: HCPCS | Performed by: FAMILY MEDICINE

## 2024-02-26 PROCEDURE — 1111F DSCHRG MED/CURRENT MED MERGE: CPT | Performed by: FAMILY MEDICINE

## 2024-02-26 NOTE — PROGRESS NOTES
Subjective:      Patient ID: Janae Perez is a 79 y.o. female.    Chief Complaint   Patient presents with    Follow-Up from [facility] 2- -- 2- \"sob\"        Patient presents with:  Follow-Up from Hospital: [facility] 2- -- 2- \"sob\"    Here for the above   Recently in the hospital   For her chf  She tells me improved  But still some sob chronically  She is on O2 3L     She did take all the antibiotic  She is here with the     She is seeing renal medicine and seeing cardiology closely    No fever  No cp no abd pain   No ha  Appetite doing well   Bm ok  Urine is ok no pain no blood     At home seeing nursing and PY    YOB: 1945    Date of Visit:  2/26/2024     -- Morphine -- Rash    --  rash    Current Outpatient Medications:  torsemide (DEMADEX) 20 MG tablet, Take 1 tablet by mouth in the morning, at noon, and at bedtime, Disp: 90 tablet, Rfl: 3  hydrOXYzine pamoate (VISTARIL) 25 MG capsule, Take 1 capsule by mouth daily as needed for Anxiety, Disp: 30 capsule, Rfl: 0  hydrALAZINE (APRESOLINE) 50 MG tablet, Take 1 tablet by mouth every 8 hours, Disp: 90 tablet, Rfl: 0  cloNIDine (CATAPRES) 0.1 MG tablet, Take 1 tablet by mouth 3 times daily, Disp: 60 tablet, Rfl: 0  allopurinol (ZYLOPRIM) 100 MG tablet, Take 1 tablet by mouth daily, Disp: 30 tablet, Rfl: 0  atorvastatin (LIPITOR) 40 MG tablet, Take 1 tablet by mouth daily, Disp: 30 tablet, Rfl: 3  saccharomyces boulardii (FLORASTOR) 250 MG capsule, Take 1 capsule by mouth 2 times daily, Disp: 60 capsule, Rfl: 2  isosorbide mononitrate (IMDUR) 120 MG extended release tablet, Take 1 tablet by mouth daily, Disp: 30 tablet, Rfl: 3  budesonide-formoterol (SYMBICORT) 80-4.5 MCG/ACT AERO, Inhale 2 puffs into the lungs 2 times daily, Disp: 10.2 g, Rfl: 3  XARELTO 15 MG TABS tablet, Take 1 tablet by mouth daily (with breakfast), Disp: , Rfl:   magnesium oxide (MAG-OX) 400 MG tablet, Take 1 tablet by mouth Daily

## 2024-02-27 ENCOUNTER — CARE COORDINATION (OUTPATIENT)
Dept: CASE MANAGEMENT | Age: 79
End: 2024-02-27

## 2024-02-27 DIAGNOSIS — I50.33 ACUTE ON CHRONIC DIASTOLIC HEART FAILURE (HCC): ICD-10-CM

## 2024-02-27 DIAGNOSIS — E87.3 METABOLIC ALKALOSIS: ICD-10-CM

## 2024-02-27 DIAGNOSIS — N39.0 URINARY TRACT INFECTION WITHOUT HEMATURIA, SITE UNSPECIFIED: ICD-10-CM

## 2024-02-27 DIAGNOSIS — E87.6 LOW BLOOD POTASSIUM: ICD-10-CM

## 2024-02-27 DIAGNOSIS — D64.9 ANEMIA, UNSPECIFIED TYPE: ICD-10-CM

## 2024-02-27 DIAGNOSIS — I50.42 CHRONIC COMBINED SYSTOLIC AND DIASTOLIC HEART FAILURE (HCC): ICD-10-CM

## 2024-02-27 DIAGNOSIS — E86.1 INTRAVASCULAR VOLUME DEPLETION: ICD-10-CM

## 2024-02-27 DIAGNOSIS — I50.32 CHRONIC DIASTOLIC HEART FAILURE (HCC): ICD-10-CM

## 2024-02-27 DIAGNOSIS — E87.8 ELECTROLYTE DISTURBANCE: ICD-10-CM

## 2024-02-27 DIAGNOSIS — N17.9 ACUTE KIDNEY INJURY SUPERIMPOSED ON CKD (HCC): ICD-10-CM

## 2024-02-27 DIAGNOSIS — N18.9 ACUTE KIDNEY INJURY SUPERIMPOSED ON CKD (HCC): ICD-10-CM

## 2024-02-27 LAB
ALBUMIN SERPL-MCNC: 4 G/DL (ref 3.4–5)
ALBUMIN/GLOB SERPL: 1.7 {RATIO} (ref 1.1–2.2)
ALP SERPL-CCNC: 92 U/L (ref 40–129)
ALT SERPL-CCNC: 13 U/L (ref 10–40)
ANION GAP SERPL CALCULATED.3IONS-SCNC: 11 MMOL/L (ref 3–16)
AST SERPL-CCNC: 16 U/L (ref 15–37)
BACTERIA URNS QL MICRO: ABNORMAL /HPF
BASOPHILS # BLD: 0.1 K/UL (ref 0–0.2)
BASOPHILS NFR BLD: 0.4 %
BILIRUB SERPL-MCNC: 0.5 MG/DL (ref 0–1)
BILIRUB UR QL STRIP.AUTO: NEGATIVE
BUN SERPL-MCNC: 34 MG/DL (ref 7–20)
CALCIUM SERPL-MCNC: 9.4 MG/DL (ref 8.3–10.6)
CHLORIDE SERPL-SCNC: 95 MMOL/L (ref 99–110)
CLARITY UR: ABNORMAL
CO2 SERPL-SCNC: 33 MMOL/L (ref 21–32)
COLOR UR: YELLOW
CREAT SERPL-MCNC: 1.5 MG/DL (ref 0.6–1.2)
DEPRECATED RDW RBC AUTO: 18.5 % (ref 12.4–15.4)
EOSINOPHIL # BLD: 0.2 K/UL (ref 0–0.6)
EOSINOPHIL NFR BLD: 1.1 %
EPI CELLS #/AREA URNS HPF: ABNORMAL /HPF (ref 0–5)
GFR SERPLBLD CREATININE-BSD FMLA CKD-EPI: 35 ML/MIN/{1.73_M2}
GLUCOSE SERPL-MCNC: 111 MG/DL (ref 70–99)
GLUCOSE UR STRIP.AUTO-MCNC: NEGATIVE MG/DL
HCT VFR BLD AUTO: 29.9 % (ref 36–48)
HGB BLD-MCNC: 9.9 G/DL (ref 12–16)
HGB UR QL STRIP.AUTO: ABNORMAL
KETONES UR STRIP.AUTO-MCNC: NEGATIVE MG/DL
LEUKOCYTE ESTERASE UR QL STRIP.AUTO: ABNORMAL
LYMPHOCYTES # BLD: 0.7 K/UL (ref 1–5.1)
LYMPHOCYTES NFR BLD: 4.9 %
MCH RBC QN AUTO: 29.8 PG (ref 26–34)
MCHC RBC AUTO-ENTMCNC: 33 G/DL (ref 31–36)
MCV RBC AUTO: 90.3 FL (ref 80–100)
MONOCYTES # BLD: 0.9 K/UL (ref 0–1.3)
MONOCYTES NFR BLD: 6.3 %
NEUTROPHILS # BLD: 12 K/UL (ref 1.7–7.7)
NEUTROPHILS NFR BLD: 87.3 %
NITRITE UR QL STRIP.AUTO: NEGATIVE
NT-PROBNP SERPL-MCNC: ABNORMAL PG/ML (ref 0–449)
PH UR STRIP.AUTO: 7.5 [PH] (ref 5–8)
PLATELET # BLD AUTO: 214 K/UL (ref 135–450)
PMV BLD AUTO: 9.9 FL (ref 5–10.5)
POTASSIUM SERPL-SCNC: 3.7 MMOL/L (ref 3.5–5.1)
PROT SERPL-MCNC: 6.4 G/DL (ref 6.4–8.2)
PROT UR STRIP.AUTO-MCNC: 100 MG/DL
RBC # BLD AUTO: 3.32 M/UL (ref 4–5.2)
RBC #/AREA URNS HPF: ABNORMAL /HPF (ref 0–4)
SODIUM SERPL-SCNC: 139 MMOL/L (ref 136–145)
SP GR UR STRIP.AUTO: 1.02 (ref 1–1.03)
UA DIPSTICK W REFLEX MICRO PNL UR: YES
URN SPEC COLLECT METH UR: ABNORMAL
UROBILINOGEN UR STRIP-ACNC: 0.2 E.U./DL
WBC # BLD AUTO: 13.8 K/UL (ref 4–11)
WBC #/AREA URNS HPF: ABNORMAL /HPF (ref 0–5)

## 2024-02-27 NOTE — CARE COORDINATION
Care Transitions Follow Up Call    Patient Current Location:  Home: [address] [place], [place] [id number]    Care Transition Nurse contacted the patient by telephone to follow up after admission on 2024.  Verified name and  with patient as identifiers.    Patient: Janae Perez  Patient : 1945   MRN: [id number]   Reason for Admission: CHF, COPD, SOB  Discharge Date: 24 RARS: Readmission Risk Score: 27.8      Needs to be reviewed by the provider   Additional needs identified to be addressed with provider: No  none             Method of communication with provider: none.    CTN spoke with patient this afternoon for follow up CTN call.  Patient states she is doing much better, or at least she feels better.  Patient states she is still SOB or has SOB with exertion, but states both are at baseline, 3L of Home O2 are sufficient at this time.  Patient states BLE Edema is present, but always is, but it is down a lot, and much improved as well.  Patient states she is elevating legs as much as possible, and feels like all the time.  No reports of any weight gain, states she is down 3 lbs, could not recall exact weight, but was 230 lb a couple days ago, so may be rufino to 227lbs, or at 230 lbs, from 233 lbs, patient not sure either.  Last Cardiology appointment was on , weight was 230 lb, and PCP appointment was on 2024 with weight at 230 lbs.  BP's are within normal on both appointments, denies having any headaches, blurred vision, or difficulty with urination.   No reports of any fever, chills, nausea, vomiting, chest pain, or cough.    Patient with no congestion, pain, difficulty emptying bladder, feeling lightheaded, dizziness, and heart palpitations.   Patient reminded that Cardiologist increased Torsemide to 60 mg PO TID, patient stated she forgot, but doesn't want to be urinating all day, every day, CTN again explained importance of taking all medications as prescribed.   No other issues

## 2024-02-28 ENCOUNTER — CARE COORDINATION (OUTPATIENT)
Dept: CASE MANAGEMENT | Age: 79
End: 2024-02-28

## 2024-02-28 LAB — BACTERIA UR CULT: NORMAL

## 2024-02-28 NOTE — CARE COORDINATION
Remote Patient Monitoring Note      Date/Time:  2/28/2024 2:53 PM  Janae Perez , I am a nurse with the remote patient monitoring team;  reaching out to you today to remind you to please take your vitals. Important: Please try to take your vitals each day before 12pm. This ensures the monitoring team will have time to connect with your providers and get back to you with any new orders or instructions. Please take all vitals, including weight, each day.      Be well,    Sue Bone LPN, [facility]  PH: [phone]

## 2024-02-29 ENCOUNTER — HOSPITAL ENCOUNTER (INPATIENT)
Age: 79
LOS: 3 days | Discharge: HOME HEALTH | DRG: 291 | End: 2024-03-03
Attending: EMERGENCY MEDICINE | Admitting: INTERNAL MEDICINE
Payer: MEDICARE

## 2024-02-29 ENCOUNTER — APPOINTMENT (OUTPATIENT)
Dept: GENERAL RADIOLOGY | Age: 79
DRG: 291 | End: 2024-02-29
Payer: MEDICARE

## 2024-02-29 ENCOUNTER — CARE COORDINATION (OUTPATIENT)
Dept: CASE MANAGEMENT | Age: 79
End: 2024-02-29

## 2024-02-29 ENCOUNTER — CARE COORDINATION (OUTPATIENT)
Dept: CARE COORDINATION | Age: 79
End: 2024-02-29

## 2024-02-29 ENCOUNTER — TELEPHONE (OUTPATIENT)
Dept: FAMILY MEDICINE CLINIC | Age: 79
End: 2024-02-29

## 2024-02-29 DIAGNOSIS — N30.00 ACUTE CYSTITIS WITHOUT HEMATURIA: Primary | ICD-10-CM

## 2024-02-29 DIAGNOSIS — I50.9 ACUTE ON CHRONIC CONGESTIVE HEART FAILURE, UNSPECIFIED HEART FAILURE TYPE (HCC): ICD-10-CM

## 2024-02-29 DIAGNOSIS — Z71.89 GOALS OF CARE, COUNSELING/DISCUSSION: ICD-10-CM

## 2024-02-29 PROBLEM — J96.21 ACUTE ON CHRONIC RESPIRATORY FAILURE WITH HYPOXIA AND HYPERCAPNIA (HCC): Status: ACTIVE | Noted: 2024-02-29

## 2024-02-29 PROBLEM — J96.22 ACUTE ON CHRONIC RESPIRATORY FAILURE WITH HYPOXIA AND HYPERCAPNIA (HCC): Status: ACTIVE | Noted: 2024-02-29

## 2024-02-29 LAB
ALBUMIN SERPL-MCNC: 3.9 G/DL (ref 3.4–5)
ALBUMIN/GLOB SERPL: 1.4 {RATIO} (ref 1.1–2.2)
ALP SERPL-CCNC: 80 U/L (ref 40–129)
ALT SERPL-CCNC: 12 U/L (ref 10–40)
ANION GAP SERPL CALCULATED.3IONS-SCNC: 8 MMOL/L (ref 3–16)
AST SERPL-CCNC: 15 U/L (ref 15–37)
BACTERIA URNS QL MICRO: ABNORMAL /HPF
BASE EXCESS BLDV CALC-SCNC: 11.1 MMOL/L
BASOPHILS # BLD: 0.1 K/UL (ref 0–0.2)
BASOPHILS NFR BLD: 0.6 %
BILIRUB SERPL-MCNC: 0.4 MG/DL (ref 0–1)
BILIRUB UR QL STRIP.AUTO: NEGATIVE
BUN SERPL-MCNC: 35 MG/DL (ref 7–20)
CALCIUM SERPL-MCNC: 9.2 MG/DL (ref 8.3–10.6)
CHLORIDE SERPL-SCNC: 96 MMOL/L (ref 99–110)
CLARITY UR: ABNORMAL
CO2 BLDV-SCNC: 38 MMOL/L
CO2 SERPL-SCNC: 36 MMOL/L (ref 21–32)
COHGB MFR BLDV: 1.7 %
COLOR UR: YELLOW
CREAT SERPL-MCNC: 1.7 MG/DL (ref 0.6–1.2)
DEPRECATED RDW RBC AUTO: 17.9 % (ref 12.4–15.4)
EOSINOPHIL # BLD: 0.3 K/UL (ref 0–0.6)
EOSINOPHIL NFR BLD: 3.1 %
EPI CELLS #/AREA URNS AUTO: 5 /HPF (ref 0–5)
FLUAV RNA UPPER RESP QL NAA+PROBE: NEGATIVE
FLUBV AG NPH QL: NEGATIVE
GFR SERPLBLD CREATININE-BSD FMLA CKD-EPI: 30 ML/MIN/{1.73_M2}
GLUCOSE SERPL-MCNC: 87 MG/DL (ref 70–99)
GLUCOSE UR STRIP.AUTO-MCNC: NEGATIVE MG/DL
HCO3 BLDV-SCNC: 37 MMOL/L (ref 23–29)
HCT VFR BLD AUTO: 27.2 % (ref 36–48)
HGB BLD-MCNC: 9 G/DL (ref 12–16)
HGB UR QL STRIP.AUTO: ABNORMAL
KETONES UR STRIP.AUTO-MCNC: NEGATIVE MG/DL
LACTATE BLDV-SCNC: 1.3 MMOL/L (ref 0.4–2)
LEUKOCYTE ESTERASE UR QL STRIP.AUTO: ABNORMAL
LYMPHOCYTES # BLD: 0.7 K/UL (ref 1–5.1)
LYMPHOCYTES NFR BLD: 7 %
MCH RBC QN AUTO: 30 PG (ref 26–34)
MCHC RBC AUTO-ENTMCNC: 33 G/DL (ref 31–36)
MCV RBC AUTO: 90.8 FL (ref 80–100)
METHGB MFR BLDV: 0.3 %
MONOCYTES # BLD: 0.6 K/UL (ref 0–1.3)
MONOCYTES NFR BLD: 5.9 %
NEUTROPHILS # BLD: 7.9 K/UL (ref 1.7–7.7)
NEUTROPHILS NFR BLD: 83.4 %
NITRITE UR QL STRIP.AUTO: POSITIVE
NT-PROBNP SERPL-MCNC: 5098 PG/ML (ref 0–449)
O2 THERAPY: ABNORMAL
PCO2 BLDV: 53.7 MMHG (ref 40–50)
PH BLDV: 7.44 [PH] (ref 7.35–7.45)
PH UR STRIP.AUTO: 6.5 [PH] (ref 5–8)
PLATELET # BLD AUTO: 206 K/UL (ref 135–450)
PMV BLD AUTO: 9.1 FL (ref 5–10.5)
PO2 BLDV: <30 MMHG
POTASSIUM SERPL-SCNC: 3.8 MMOL/L (ref 3.5–5.1)
PROT SERPL-MCNC: 6.6 G/DL (ref 6.4–8.2)
PROT UR STRIP.AUTO-MCNC: 30 MG/DL
RBC # BLD AUTO: 2.99 M/UL (ref 4–5.2)
RBC CLUMPS #/AREA URNS AUTO: 2 /HPF (ref 0–4)
SAO2 % BLDV: 51 %
SARS-COV-2 RDRP RESP QL NAA+PROBE: NOT DETECTED
SODIUM SERPL-SCNC: 140 MMOL/L (ref 136–145)
SP GR UR STRIP.AUTO: 1.01 (ref 1–1.03)
TROPONIN, HIGH SENSITIVITY: 109 NG/L (ref 0–14)
TROPONIN, HIGH SENSITIVITY: 120 NG/L (ref 0–14)
UA COMPLETE W REFLEX CULTURE PNL UR: YES
UA DIPSTICK W REFLEX MICRO PNL UR: YES
URN SPEC COLLECT METH UR: ABNORMAL
UROBILINOGEN UR STRIP-ACNC: 0.2 E.U./DL
WBC # BLD AUTO: 9.5 K/UL (ref 4–11)
WBC #/AREA URNS HPF: ABNORMAL /HPF (ref 0–5)

## 2024-02-29 PROCEDURE — 2580000003 HC RX 258: Performed by: INTERNAL MEDICINE

## 2024-02-29 PROCEDURE — 84484 ASSAY OF TROPONIN QUANT: CPT

## 2024-02-29 PROCEDURE — 83880 ASSAY OF NATRIURETIC PEPTIDE: CPT

## 2024-02-29 PROCEDURE — 83605 ASSAY OF LACTIC ACID: CPT

## 2024-02-29 PROCEDURE — 87086 URINE CULTURE/COLONY COUNT: CPT

## 2024-02-29 PROCEDURE — 6370000000 HC RX 637 (ALT 250 FOR IP): Performed by: INTERNAL MEDICINE

## 2024-02-29 PROCEDURE — 2580000003 HC RX 258

## 2024-02-29 PROCEDURE — 6360000002 HC RX W HCPCS: Performed by: EMERGENCY MEDICINE

## 2024-02-29 PROCEDURE — 85025 COMPLETE CBC W/AUTO DIFF WBC: CPT

## 2024-02-29 PROCEDURE — 87804 INFLUENZA ASSAY W/OPTIC: CPT

## 2024-02-29 PROCEDURE — 2060000000 HC ICU INTERMEDIATE R&B

## 2024-02-29 PROCEDURE — 6360000002 HC RX W HCPCS: Performed by: INTERNAL MEDICINE

## 2024-02-29 PROCEDURE — 6370000000 HC RX 637 (ALT 250 FOR IP)

## 2024-02-29 PROCEDURE — 96375 TX/PRO/DX INJ NEW DRUG ADDON: CPT

## 2024-02-29 PROCEDURE — 96374 THER/PROPH/DIAG INJ IV PUSH: CPT

## 2024-02-29 PROCEDURE — 99285 EMERGENCY DEPT VISIT HI MDM: CPT

## 2024-02-29 PROCEDURE — 87186 SC STD MICRODIL/AGAR DIL: CPT

## 2024-02-29 PROCEDURE — 94640 AIRWAY INHALATION TREATMENT: CPT

## 2024-02-29 PROCEDURE — 81001 URINALYSIS AUTO W/SCOPE: CPT

## 2024-02-29 PROCEDURE — 6360000002 HC RX W HCPCS

## 2024-02-29 PROCEDURE — 94761 N-INVAS EAR/PLS OXIMETRY MLT: CPT

## 2024-02-29 PROCEDURE — 87088 URINE BACTERIA CULTURE: CPT

## 2024-02-29 PROCEDURE — 80053 COMPREHEN METABOLIC PANEL: CPT

## 2024-02-29 PROCEDURE — 71045 X-RAY EXAM CHEST 1 VIEW: CPT

## 2024-02-29 PROCEDURE — 93005 ELECTROCARDIOGRAM TRACING: CPT

## 2024-02-29 PROCEDURE — 82803 BLOOD GASES ANY COMBINATION: CPT

## 2024-02-29 PROCEDURE — 87635 SARS-COV-2 COVID-19 AMP PRB: CPT

## 2024-02-29 RX ORDER — ACETAMINOPHEN 650 MG/1
650 SUPPOSITORY RECTAL EVERY 6 HOURS PRN
Status: DISCONTINUED | OUTPATIENT
Start: 2024-02-29 | End: 2024-03-03 | Stop reason: HOSPADM

## 2024-02-29 RX ORDER — ATORVASTATIN CALCIUM 40 MG/1
40 TABLET, FILM COATED ORAL DAILY
Status: DISCONTINUED | OUTPATIENT
Start: 2024-03-01 | End: 2024-03-03 | Stop reason: HOSPADM

## 2024-02-29 RX ORDER — ACETAMINOPHEN 325 MG/1
650 TABLET ORAL EVERY 6 HOURS PRN
Status: DISCONTINUED | OUTPATIENT
Start: 2024-02-29 | End: 2024-03-03 | Stop reason: HOSPADM

## 2024-02-29 RX ORDER — DILTIAZEM HYDROCHLORIDE 120 MG/1
120 CAPSULE, COATED, EXTENDED RELEASE ORAL DAILY
Status: DISCONTINUED | OUTPATIENT
Start: 2024-03-01 | End: 2024-03-01

## 2024-02-29 RX ORDER — ONDANSETRON 2 MG/ML
4 INJECTION INTRAMUSCULAR; INTRAVENOUS EVERY 6 HOURS PRN
Status: DISCONTINUED | OUTPATIENT
Start: 2024-02-29 | End: 2024-03-03 | Stop reason: HOSPADM

## 2024-02-29 RX ORDER — HYDRALAZINE HYDROCHLORIDE 20 MG/ML
10 INJECTION INTRAMUSCULAR; INTRAVENOUS EVERY 6 HOURS PRN
Status: DISCONTINUED | OUTPATIENT
Start: 2024-02-29 | End: 2024-03-03 | Stop reason: HOSPADM

## 2024-02-29 RX ORDER — FUROSEMIDE 10 MG/ML
40 INJECTION INTRAMUSCULAR; INTRAVENOUS ONCE
Status: COMPLETED | OUTPATIENT
Start: 2024-02-29 | End: 2024-02-29

## 2024-02-29 RX ORDER — HYDROXYZINE PAMOATE 25 MG/1
25 CAPSULE ORAL DAILY PRN
Status: DISCONTINUED | OUTPATIENT
Start: 2024-02-29 | End: 2024-03-03 | Stop reason: HOSPADM

## 2024-02-29 RX ORDER — FUROSEMIDE 10 MG/ML
40 INJECTION INTRAMUSCULAR; INTRAVENOUS 2 TIMES DAILY
Status: DISCONTINUED | OUTPATIENT
Start: 2024-03-01 | End: 2024-03-03

## 2024-02-29 RX ORDER — SODIUM CHLORIDE 0.9 % (FLUSH) 0.9 %
5-40 SYRINGE (ML) INJECTION PRN
Status: DISCONTINUED | OUTPATIENT
Start: 2024-02-29 | End: 2024-03-03 | Stop reason: HOSPADM

## 2024-02-29 RX ORDER — LACTOBACILLUS RHAMNOSUS GG 10B CELL
1 CAPSULE ORAL 2 TIMES DAILY
Status: DISCONTINUED | OUTPATIENT
Start: 2024-02-29 | End: 2024-03-03 | Stop reason: HOSPADM

## 2024-02-29 RX ORDER — IPRATROPIUM BROMIDE AND ALBUTEROL SULFATE 2.5; .5 MG/3ML; MG/3ML
1 SOLUTION RESPIRATORY (INHALATION) ONCE
Status: COMPLETED | OUTPATIENT
Start: 2024-02-29 | End: 2024-02-29

## 2024-02-29 RX ORDER — DROPERIDOL 2.5 MG/ML
1.25 INJECTION, SOLUTION INTRAMUSCULAR; INTRAVENOUS ONCE
Status: COMPLETED | OUTPATIENT
Start: 2024-02-29 | End: 2024-02-29

## 2024-02-29 RX ORDER — ISOSORBIDE MONONITRATE 60 MG/1
120 TABLET, EXTENDED RELEASE ORAL DAILY
Status: DISCONTINUED | OUTPATIENT
Start: 2024-03-01 | End: 2024-03-03 | Stop reason: HOSPADM

## 2024-02-29 RX ORDER — ENOXAPARIN SODIUM 100 MG/ML
40 INJECTION SUBCUTANEOUS DAILY
Status: CANCELLED | OUTPATIENT
Start: 2024-02-29

## 2024-02-29 RX ORDER — SODIUM CHLORIDE 0.9 % (FLUSH) 0.9 %
5-40 SYRINGE (ML) INJECTION EVERY 12 HOURS SCHEDULED
Status: DISCONTINUED | OUTPATIENT
Start: 2024-02-29 | End: 2024-03-03 | Stop reason: HOSPADM

## 2024-02-29 RX ORDER — IPRATROPIUM BROMIDE AND ALBUTEROL SULFATE 2.5; .5 MG/3ML; MG/3ML
1 SOLUTION RESPIRATORY (INHALATION)
Status: DISCONTINUED | OUTPATIENT
Start: 2024-02-29 | End: 2024-03-03 | Stop reason: HOSPADM

## 2024-02-29 RX ORDER — CLONIDINE HYDROCHLORIDE 0.1 MG/1
0.1 TABLET ORAL ONCE
Status: COMPLETED | OUTPATIENT
Start: 2024-02-29 | End: 2024-02-29

## 2024-02-29 RX ORDER — PREDNISONE 20 MG/1
40 TABLET ORAL DAILY
Status: DISCONTINUED | OUTPATIENT
Start: 2024-03-01 | End: 2024-03-03 | Stop reason: HOSPADM

## 2024-02-29 RX ORDER — SODIUM CHLORIDE 9 MG/ML
INJECTION, SOLUTION INTRAVENOUS PRN
Status: DISCONTINUED | OUTPATIENT
Start: 2024-02-29 | End: 2024-03-03 | Stop reason: HOSPADM

## 2024-02-29 RX ORDER — M-VIT,TX,IRON,MINS/CALC/FOLIC 27MG-0.4MG
1 TABLET ORAL DAILY
Status: DISCONTINUED | OUTPATIENT
Start: 2024-03-01 | End: 2024-03-03 | Stop reason: HOSPADM

## 2024-02-29 RX ORDER — ALLOPURINOL 100 MG/1
100 TABLET ORAL DAILY
Status: DISCONTINUED | OUTPATIENT
Start: 2024-03-01 | End: 2024-03-03 | Stop reason: HOSPADM

## 2024-02-29 RX ORDER — POLYETHYLENE GLYCOL 3350 17 G/17G
17 POWDER, FOR SOLUTION ORAL DAILY PRN
Status: DISCONTINUED | OUTPATIENT
Start: 2024-02-29 | End: 2024-03-03 | Stop reason: HOSPADM

## 2024-02-29 RX ORDER — MAGNESIUM SULFATE IN WATER 40 MG/ML
2000 INJECTION, SOLUTION INTRAVENOUS PRN
Status: DISCONTINUED | OUTPATIENT
Start: 2024-02-29 | End: 2024-03-03 | Stop reason: HOSPADM

## 2024-02-29 RX ORDER — METHYLPREDNISOLONE SODIUM SUCCINATE 125 MG/2ML
125 INJECTION, POWDER, LYOPHILIZED, FOR SOLUTION INTRAMUSCULAR; INTRAVENOUS ONCE
Status: COMPLETED | OUTPATIENT
Start: 2024-02-29 | End: 2024-02-29

## 2024-02-29 RX ORDER — CLONIDINE HYDROCHLORIDE 0.1 MG/1
0.1 TABLET ORAL 3 TIMES DAILY
Status: DISCONTINUED | OUTPATIENT
Start: 2024-03-01 | End: 2024-03-03 | Stop reason: HOSPADM

## 2024-02-29 RX ORDER — POTASSIUM CHLORIDE 20 MEQ/1
40 TABLET, EXTENDED RELEASE ORAL PRN
Status: DISCONTINUED | OUTPATIENT
Start: 2024-02-29 | End: 2024-03-03 | Stop reason: HOSPADM

## 2024-02-29 RX ORDER — HYDRALAZINE HYDROCHLORIDE 50 MG/1
50 TABLET, FILM COATED ORAL EVERY 8 HOURS SCHEDULED
Status: DISCONTINUED | OUTPATIENT
Start: 2024-02-29 | End: 2024-03-03 | Stop reason: HOSPADM

## 2024-02-29 RX ORDER — LANOLIN ALCOHOL/MO/W.PET/CERES
400 CREAM (GRAM) TOPICAL
Status: DISCONTINUED | OUTPATIENT
Start: 2024-03-01 | End: 2024-03-03 | Stop reason: HOSPADM

## 2024-02-29 RX ORDER — ONDANSETRON 4 MG/1
4 TABLET, ORALLY DISINTEGRATING ORAL EVERY 8 HOURS PRN
Status: DISCONTINUED | OUTPATIENT
Start: 2024-02-29 | End: 2024-03-03 | Stop reason: HOSPADM

## 2024-02-29 RX ORDER — POTASSIUM CHLORIDE 7.45 MG/ML
10 INJECTION INTRAVENOUS PRN
Status: DISCONTINUED | OUTPATIENT
Start: 2024-02-29 | End: 2024-03-03 | Stop reason: HOSPADM

## 2024-02-29 RX ADMIN — HYDROXYZINE PAMOATE 25 MG: 25 CAPSULE ORAL at 22:34

## 2024-02-29 RX ADMIN — HYDRALAZINE HYDROCHLORIDE 50 MG: 50 TABLET ORAL at 22:34

## 2024-02-29 RX ADMIN — DROPERIDOL 1.25 MG: 2.5 INJECTION, SOLUTION INTRAMUSCULAR; INTRAVENOUS at 17:10

## 2024-02-29 RX ADMIN — FUROSEMIDE 40 MG: 10 INJECTION, SOLUTION INTRAMUSCULAR; INTRAVENOUS at 17:12

## 2024-02-29 RX ADMIN — CLONIDINE HYDROCHLORIDE 0.1 MG: 0.1 TABLET ORAL at 20:14

## 2024-02-29 RX ADMIN — IPRATROPIUM BROMIDE AND ALBUTEROL SULFATE 1 DOSE: 2.5; .5 SOLUTION RESPIRATORY (INHALATION) at 21:05

## 2024-02-29 RX ADMIN — Medication 1 CAPSULE: at 22:34

## 2024-02-29 RX ADMIN — IPRATROPIUM BROMIDE AND ALBUTEROL SULFATE 1 DOSE: 2.5; .5 SOLUTION RESPIRATORY (INHALATION) at 16:39

## 2024-02-29 RX ADMIN — METHYLPREDNISOLONE SODIUM SUCCINATE 125 MG: 125 INJECTION INTRAMUSCULAR; INTRAVENOUS at 16:38

## 2024-02-29 RX ADMIN — Medication 10 ML: at 22:35

## 2024-02-29 RX ADMIN — HYDRALAZINE HYDROCHLORIDE 10 MG: 20 INJECTION INTRAMUSCULAR; INTRAVENOUS at 21:01

## 2024-02-29 RX ADMIN — WATER 1000 MG: 1 INJECTION INTRAMUSCULAR; INTRAVENOUS; SUBCUTANEOUS at 17:04

## 2024-02-29 ASSESSMENT — ENCOUNTER SYMPTOMS
APNEA: 0
CHEST TIGHTNESS: 0
COUGH: 1
WHEEZING: 0
SHORTNESS OF BREATH: 1

## 2024-02-29 ASSESSMENT — PAIN - FUNCTIONAL ASSESSMENT: PAIN_FUNCTIONAL_ASSESSMENT: 0-10

## 2024-02-29 ASSESSMENT — PAIN SCALES - GENERAL: PAINLEVEL_OUTOF10: 0

## 2024-02-29 NOTE — CARE COORDINATION
Noted pt needs her daughter to help her obtain vitals as her  cannot assist her. Pt daughter works during the day and unavailable. The alert will trigger at 48 hours of no metrics, would you like to disable the alert ? We would need 16 days of a bluetooth metric a month  to continue

## 2024-02-29 NOTE — TELEPHONE ENCOUNTER
Holli with [facility] calling on pt SOB oxygen is 98% /86 lungs with wheezing and diminished sounds stayed the same after a nebulizer usage HR 66 x 1 day. Spoke with Dr Dhillon advised to go to the ER and follow up with her cardiologist

## 2024-02-29 NOTE — PROGRESS NOTES
2/15                          PAD 30                CardioMems reading 2/19                           PAD 20-day prior to d/c    On demadex               Recent repeat echo with preserved EF                          Unable to assess diastolic fx                           On Imdur                          ACE held secondary to renal fx                           On Imdur/hydralazine                          No aldactone with renal fx and preserved EF               increase demadex    Reinforced need to limit Na and fluids    Needs to do CardioMems reading     2. Atrial fibrillation              Paroxsymal                          On Calcium channel blocker                           Continue               Continue Xarelto     3. MARK on CKD              Cr 1.5        Stable                       4. CAD              Stable              Continue medical therapy     Patient has f/u with PCP on 2/26   F/u with Dr Lopez following Monday 3/4      Further evaluation will be based upon the patient's clinical course and testing results.     All questions and concerns were addressed to the patient/family. Alternatives to my treatment were discussed.     CHRISTINE Kim-CNP  [facility]  Cardiology  2/29/2024  5:02 PM         Post-Discharge Transitional Care Follow Up      Janae Perez   YOB: 1945    Date of Office Visit:  2/23/2024  Date of Hospital Admission: 2/13/24  Date of Hospital Discharge: 2/20/24  Readmission Risk Score (high >=14%. Medium >=10%):Readmission Risk Score: 27.8      Care management risk score Rising risk (score 2-5) and Complex Care (Scores >=6): No Risk Score On File     Non face to face  following discharge, date last encounter closed (first attempt may have been earlier): 02/22/2024     Call initiated 2 business days of discharge: Yes     Acute on chronic diastolic heart failure (HCC)  Severe obesity (BMI 35.0-39.9) with comorbidity (HCC)  Atherosclerotic heart disease of

## 2024-02-29 NOTE — CARE COORDINATION
Care Transitions Follow Up Call    Patient Current Location:  Home: [address] [place] [id number]    Care Transition Nurse contacted the patient by telephone to follow up after admission on 2024.  Verified name and  with patient as identifiers.    Patient: Janae Perez  Patient : 1945   MRN: [id number]  Reason for Admission: SOB  Discharge Date: 24 RARS: Readmission Risk Score: 27.8      Needs to be reviewed by the provider   Additional needs identified to be addressed with provider: No               Method of communication with provider: none.    Janae states she is doing \"alright.\" She states [facility] remains active. Janae states she is using the home oxygen continuously at 2 lpm. Her most recent O2 sat = 97%. When writer attempted to do RPM Welcome Call Janae asked writer to speak to her spouse. When Linda got on the phone he states he doesn't know what he is doing and writer will need to speak to his daughter. He states writer can not call her because his daughter is at work. Linda took writer's number and states he will have his daughter call writer.    Discussed the importance of compliance with weight, BP, and pulse ox if Janae is going to participate in RPM.     Janae's weight in HRS = 211.7lbs. No other vitals documented.      Follow Up  Future Appointments   Date Time Provider Department Center   3/4/2024  2:45 PM Dejuan Lopez MD [facility]        Care Transitions Subsequent and Final Call    Subsequent and Final Calls  Do you have any ongoing symptoms?: No  Have your medications changed?: No  Do you have any questions related to your medications?: No  Do you currently have any active services?: Yes  Are you currently active with any services?: Home Health  Do you have any needs or concerns that I can assist you with?: No  Identified Barriers: None  Care Transitions Interventions  Other Interventions:             Care Transition Nurse provided

## 2024-02-29 NOTE — ED NOTES
Pt repositioned at this time.       Pt placed on 2L nc at this time. Per pt for comfort. Tamy PELAYO aware.

## 2024-02-29 NOTE — ED NOTES
Pt arrived to dept via ems.  Pt c/o   SOB on going. Hx chf. Vss. 95% on room air   .  Pt awake, alert to self and location.  Skin warm and pale. Bilateral lower extremely edema noted.   Resp easy and unlabored.  Pt placed in gown and on cardiac monitor.  Call light in reach.

## 2024-02-29 NOTE — ED PROVIDER NOTES
[facility] EMERGENCY DEPARTMENT  EMERGENCY DEPARTMENT ENCOUNTER      Pt Name: Janae Perez  MRN: [id number]  Birthdate 1945  Date of evaluation: 2/29/2024  Provider: Shanna Liang MD  PCP: Boubacar Dhillon MD  Note Started: 4:43 PM EST 2/29/24    ED Attending Attestation Note     CHIEF COMPLAINT       Chief Complaint   Patient presents with    Shortness of Breath     SOB on going. Hx chf. Vss. 95% on room air     EMERGENCY DEPARTMENT COURSE and DIFFERENTIAL DIAGNOSIS/MDM:      This patient was seen by the advance practice provider.  In addition to the LIS I personally saw Janae Perez and made/approved the management plan. I take responsibility for the patient management.     Briefly, this is a 79 y.o. female who presents to the emergency department 2/2 concern for sob. Started this morning. Has a history of COPD and CHF. Uses 2L NC at night at baseline but not during the day. Reports she has been using her nebulizers and inhalers more than her usual. No cough no nausea no vomiting or fevers. No chest or abdominal pain. No urinary symptoms. Denies smoking.  She does have a history of A-fib and is on Xarelto, states she has not missed any doses.    On exam has significant BLE edema, pitting, 3+. Endorses she takes torsemide but not sure how much or when because her  takes care of her meds. Lungs with very mild expiratory wheezing noted.  She does appear very agitated on exam, ordered droperidol.    Workup here is concerning for heart failure exacerbation.  Elevated BNP 5098.  Worsening kidney function compared to prior.  Troponin elevated 109, repeat stable 120.  Likely related to type II demand ischemia.  She did have elevated blood pressure here noted, had her home dose of blood pressure medications given.  Workup also revealed a UTI.  Will plan for admission.  Discussed with patient.  She is agreeable.  Discussed goals of care, she is a full code.    EKG: interpreted by the Emergency  ALLOPURINOL (ZYLOPRIM) 100 MG TABLET    Take 1 tablet by mouth daily    ATORVASTATIN (LIPITOR) 40 MG TABLET    Take 1 tablet by mouth daily    BUDESONIDE-FORMOTEROL (SYMBICORT) 80-4.5 MCG/ACT AERO    Inhale 2 puffs into the lungs 2 times daily    CLONIDINE (CATAPRES) 0.1 MG TABLET    Take 1 tablet by mouth 3 times daily    DILTIAZEM (CARDIZEM CD) 120 MG EXTENDED RELEASE CAPSULE    Take 1 capsule by mouth daily    HYDRALAZINE (APRESOLINE) 50 MG TABLET    Take 1 tablet by mouth every 8 hours    HYDROXYZINE PAMOATE (VISTARIL) 25 MG CAPSULE    Take 1 capsule by mouth daily as needed for Anxiety    ISOSORBIDE MONONITRATE (IMDUR) 120 MG EXTENDED RELEASE TABLET    Take 1 tablet by mouth daily    MAGNESIUM OXIDE (MAG-OX) 400 MG TABLET    Take 1 tablet by mouth Daily with lunch    MULTIPLE VITAMINS-MINERALS (MULTI FOR HER 50+ PO)    Take 1 tablet by mouth daily    SACCHAROMYCES BOULARDII (FLORASTOR) 250 MG CAPSULE    Take 1 capsule by mouth 2 times daily    TORSEMIDE (DEMADEX) 20 MG TABLET    Take 1 tablet by mouth in the morning, at noon, and at bedtime    XARELTO 15 MG TABS TABLET    Take 1 tablet by mouth daily (with breakfast)      DIAGNOSTIC RESULTS   RADIOLOGY:   Interpretation per Radiologist below, if available at the time of this note:  XR CHEST PORTABLE    (Results Pending)       Disposition Considerations (tests considered but not done, Shared Decision Making, Pt Expectation of Test or Tx.): Admission    FINAL IMPRESSION      1. Acute cystitis without hematuria    2. Acute on chronic congestive heart failure, unspecified heart failure type (HCC)    3. Goals of care, counseling/discussion        DISPOSITION/PLAN   DISPOSITION          (Please note that portions of this note were completed with a voice recognition program. Efforts were made to edit the dictations but occasionally words are mis-transcribed.)    Shanna Liang MD (electronically signed)  Attending Emergency Physician       Shanna Liang,

## 2024-02-29 NOTE — ED PROVIDER NOTES
pitting edema, minor expiratory wheezing, no rhonchi,  breathing treatment was provided.  Patient was put on 2 L of oxygen for comfort.  Heart auscultation showed no murmurs.  Pulm examination was benign, no tenderness to palpation, bowel sounds were present in all quadrant.    Labs were significant for a BNP of 5098,   Urinalysis show an infection, patient was given ceftriaxone.  CMP showed creatinine of 1.5, GFR of 30, BUN of 35, this is worsening from previous.  His troponin was 109, second troponin was 120, even with elevation of the troponin is not considerably high to treat, but I do think it is important to keep the patient observation.  EKG showed sinus rhythm with premature supraventricular complexes.  Chest x-ray was interpreted by me, it showed no acute process, no signs of pneumonia, pneumothorax, pleural effusions.    While in the ED patient's blood pressure was elevated to 207/84, she denies chest pain, headaches, blurry vision.  Patient was given clonidine (patient takes this medication at night).  Patient presented with a urinary tract infection and CHF exacerbation, hospital mission will be beneficial in order to manage patient's condition.  She agreed to the plan.    Discussed with the patient the goals of care, she is full code.  While in the ED the patient was alert and oriented, no acute distress.    The patient tolerated their visit well.  The patient and / or the family were informed of the results of any tests, a time was given to answer questions, a plan was proposed and they agreed with plan.    I am the Primary Clinician of Record.  FINAL IMPRESSION      1. Acute cystitis without hematuria    2. Acute on chronic congestive heart failure, unspecified heart failure type (HCC)    3. Goals of care, counseling/discussion          DISPOSITION/PLAN     DISPOSITION Decision To Admit 02/29/2024 07:35:03 PM      PATIENT REFERRED TO:  No follow-up provider specified.    DISCHARGE MEDICATIONS:  New

## 2024-03-01 ENCOUNTER — CARE COORDINATION (OUTPATIENT)
Dept: CASE MANAGEMENT | Age: 79
End: 2024-03-01

## 2024-03-01 LAB
ANION GAP SERPL CALCULATED.3IONS-SCNC: 11 MMOL/L (ref 3–16)
BASOPHILS # BLD: 0 K/UL (ref 0–0.2)
BASOPHILS NFR BLD: 0.1 %
BUN SERPL-MCNC: 30 MG/DL (ref 7–20)
CALCIUM SERPL-MCNC: 9.1 MG/DL (ref 8.3–10.6)
CHLORIDE SERPL-SCNC: 95 MMOL/L (ref 99–110)
CO2 SERPL-SCNC: 34 MMOL/L (ref 21–32)
CREAT SERPL-MCNC: 1.5 MG/DL (ref 0.6–1.2)
DEPRECATED RDW RBC AUTO: 18.3 % (ref 12.4–15.4)
EKG ATRIAL RATE: 85 BPM
EKG DIAGNOSIS: NORMAL
EKG P AXIS: 42 DEGREES
EKG P-R INTERVAL: 190 MS
EKG Q-T INTERVAL: 382 MS
EKG QRS DURATION: 68 MS
EKG QTC CALCULATION (BAZETT): 454 MS
EKG R AXIS: -6 DEGREES
EKG T AXIS: -13 DEGREES
EKG VENTRICULAR RATE: 85 BPM
EOSINOPHIL # BLD: 0 K/UL (ref 0–0.6)
EOSINOPHIL NFR BLD: 0 %
GFR SERPLBLD CREATININE-BSD FMLA CKD-EPI: 35 ML/MIN/{1.73_M2}
GLUCOSE SERPL-MCNC: 168 MG/DL (ref 70–99)
HCT VFR BLD AUTO: 27.7 % (ref 36–48)
HGB BLD-MCNC: 9.5 G/DL (ref 12–16)
LYMPHOCYTES # BLD: 0.4 K/UL (ref 1–5.1)
LYMPHOCYTES NFR BLD: 3.5 %
MAGNESIUM SERPL-MCNC: 2.4 MG/DL (ref 1.8–2.4)
MCH RBC QN AUTO: 30.6 PG (ref 26–34)
MCHC RBC AUTO-ENTMCNC: 34.2 G/DL (ref 31–36)
MCV RBC AUTO: 89.3 FL (ref 80–100)
MONOCYTES # BLD: 0.1 K/UL (ref 0–1.3)
MONOCYTES NFR BLD: 0.9 %
NEUTROPHILS # BLD: 9.6 K/UL (ref 1.7–7.7)
NEUTROPHILS NFR BLD: 95.5 %
PLATELET # BLD AUTO: 209 K/UL (ref 135–450)
PMV BLD AUTO: 9.1 FL (ref 5–10.5)
POTASSIUM SERPL-SCNC: 3.7 MMOL/L (ref 3.5–5.1)
RBC # BLD AUTO: 3.1 M/UL (ref 4–5.2)
SODIUM SERPL-SCNC: 140 MMOL/L (ref 136–145)
TROPONIN, HIGH SENSITIVITY: 74 NG/L (ref 0–14)
TROPONIN, HIGH SENSITIVITY: 83 NG/L (ref 0–14)
WBC # BLD AUTO: 10.1 K/UL (ref 4–11)

## 2024-03-01 PROCEDURE — 2580000003 HC RX 258: Performed by: INTERNAL MEDICINE

## 2024-03-01 PROCEDURE — 94760 N-INVAS EAR/PLS OXIMETRY 1: CPT

## 2024-03-01 PROCEDURE — 2700000000 HC OXYGEN THERAPY PER DAY

## 2024-03-01 PROCEDURE — 36415 COLL VENOUS BLD VENIPUNCTURE: CPT

## 2024-03-01 PROCEDURE — 2060000000 HC ICU INTERMEDIATE R&B

## 2024-03-01 PROCEDURE — 85025 COMPLETE CBC W/AUTO DIFF WBC: CPT

## 2024-03-01 PROCEDURE — 83735 ASSAY OF MAGNESIUM: CPT

## 2024-03-01 PROCEDURE — 97162 PT EVAL MOD COMPLEX 30 MIN: CPT

## 2024-03-01 PROCEDURE — 6360000002 HC RX W HCPCS: Performed by: INTERNAL MEDICINE

## 2024-03-01 PROCEDURE — 94640 AIRWAY INHALATION TREATMENT: CPT

## 2024-03-01 PROCEDURE — 6370000000 HC RX 637 (ALT 250 FOR IP): Performed by: INTERNAL MEDICINE

## 2024-03-01 PROCEDURE — 80048 BASIC METABOLIC PNL TOTAL CA: CPT

## 2024-03-01 PROCEDURE — 6370000000 HC RX 637 (ALT 250 FOR IP): Performed by: NURSE PRACTITIONER

## 2024-03-01 PROCEDURE — 97530 THERAPEUTIC ACTIVITIES: CPT

## 2024-03-01 PROCEDURE — 99223 1ST HOSP IP/OBS HIGH 75: CPT | Performed by: INTERNAL MEDICINE

## 2024-03-01 PROCEDURE — 84484 ASSAY OF TROPONIN QUANT: CPT

## 2024-03-01 PROCEDURE — 93010 ELECTROCARDIOGRAM REPORT: CPT | Performed by: INTERNAL MEDICINE

## 2024-03-01 RX ORDER — LANOLIN ALCOHOL/MO/W.PET/CERES
3 CREAM (GRAM) TOPICAL NIGHTLY PRN
Status: DISCONTINUED | OUTPATIENT
Start: 2024-03-01 | End: 2024-03-03 | Stop reason: HOSPADM

## 2024-03-01 RX ADMIN — CLONIDINE HYDROCHLORIDE 0.1 MG: 0.1 TABLET ORAL at 12:51

## 2024-03-01 RX ADMIN — IPRATROPIUM BROMIDE AND ALBUTEROL SULFATE 1 DOSE: 2.5; .5 SOLUTION RESPIRATORY (INHALATION) at 20:56

## 2024-03-01 RX ADMIN — Medication 10 ML: at 21:26

## 2024-03-01 RX ADMIN — WATER 1000 MG: 1 INJECTION INTRAMUSCULAR; INTRAVENOUS; SUBCUTANEOUS at 17:01

## 2024-03-01 RX ADMIN — ISOSORBIDE MONONITRATE 120 MG: 60 TABLET, EXTENDED RELEASE ORAL at 10:24

## 2024-03-01 RX ADMIN — MOMETASONE FUROATE AND FORMOTEROL FUMARATE DIHYDRATE 2 PUFF: 100; 5 AEROSOL RESPIRATORY (INHALATION) at 20:57

## 2024-03-01 RX ADMIN — HYDRALAZINE HYDROCHLORIDE 50 MG: 50 TABLET ORAL at 21:25

## 2024-03-01 RX ADMIN — FUROSEMIDE 40 MG: 10 INJECTION, SOLUTION INTRAMUSCULAR; INTRAVENOUS at 16:58

## 2024-03-01 RX ADMIN — IPRATROPIUM BROMIDE AND ALBUTEROL SULFATE 1 DOSE: 2.5; .5 SOLUTION RESPIRATORY (INHALATION) at 09:53

## 2024-03-01 RX ADMIN — Medication 1 TABLET: at 10:24

## 2024-03-01 RX ADMIN — IPRATROPIUM BROMIDE AND ALBUTEROL SULFATE 1 DOSE: 2.5; .5 SOLUTION RESPIRATORY (INHALATION) at 12:46

## 2024-03-01 RX ADMIN — ALLOPURINOL 100 MG: 100 TABLET ORAL at 10:25

## 2024-03-01 RX ADMIN — MOMETASONE FUROATE AND FORMOTEROL FUMARATE DIHYDRATE 2 PUFF: 100; 5 AEROSOL RESPIRATORY (INHALATION) at 09:54

## 2024-03-01 RX ADMIN — RIVAROXABAN 15 MG: 15 TABLET, FILM COATED ORAL at 10:24

## 2024-03-01 RX ADMIN — FUROSEMIDE 40 MG: 10 INJECTION, SOLUTION INTRAMUSCULAR; INTRAVENOUS at 10:23

## 2024-03-01 RX ADMIN — ATORVASTATIN CALCIUM 40 MG: 40 TABLET, FILM COATED ORAL at 10:24

## 2024-03-01 RX ADMIN — HYDRALAZINE HYDROCHLORIDE 50 MG: 50 TABLET ORAL at 12:51

## 2024-03-01 RX ADMIN — DILTIAZEM HYDROCHLORIDE 120 MG: 120 CAPSULE, EXTENDED RELEASE ORAL at 10:24

## 2024-03-01 RX ADMIN — IPRATROPIUM BROMIDE AND ALBUTEROL SULFATE 1 DOSE: 2.5; .5 SOLUTION RESPIRATORY (INHALATION) at 16:29

## 2024-03-01 RX ADMIN — Medication 10 ML: at 10:57

## 2024-03-01 RX ADMIN — Medication 1 CAPSULE: at 10:24

## 2024-03-01 RX ADMIN — Medication 3 MG: at 21:25

## 2024-03-01 RX ADMIN — PREDNISONE 40 MG: 20 TABLET ORAL at 10:24

## 2024-03-01 RX ADMIN — Medication 400 MG: at 10:24

## 2024-03-01 RX ADMIN — Medication 1 CAPSULE: at 21:25

## 2024-03-01 RX ADMIN — HYDRALAZINE HYDROCHLORIDE 50 MG: 50 TABLET ORAL at 05:07

## 2024-03-01 RX ADMIN — HYDROXYZINE PAMOATE 25 MG: 25 CAPSULE ORAL at 21:25

## 2024-03-01 RX ADMIN — CLONIDINE HYDROCHLORIDE 0.1 MG: 0.1 TABLET ORAL at 21:25

## 2024-03-01 RX ADMIN — CLONIDINE HYDROCHLORIDE 0.1 MG: 0.1 TABLET ORAL at 10:24

## 2024-03-01 ASSESSMENT — PAIN SCALES - GENERAL: PAINLEVEL_OUTOF10: 0

## 2024-03-01 NOTE — PROGRESS NOTES
Comprehensive Nutrition Assessment    Type and Reason for Visit:  Initial, Patient Education, Consult    Nutrition Recommendations/Plan:   Heart healthy diet with FR 1500 ml/day per MD  Ensure with meals due to increased protein needs  Will monitor nutritional adequacy, nutrition-related labs, weights, BMs, and clinical progress      Malnutrition Assessment:  Malnutrition Status:  At risk for malnutrition (Comment) (recurrent hospitalizations) (03/01/24 1402)    Context:  Chronic Illness       Nutrition Assessment:    Patient admitted with acute on chronic respiratory failure with hypoxia and hypercapnia.  Currently on a heart healthy diet.  Appetite fair.  Patient's  requested Ensure for protein, patient agreeable to Ensure vanilla with meals.  RD obtained an Ensure for this afternoon to sip on and 3.00 off coupons for home use.  Patient's  stated will be buying some for home.  RD also provided handouts with visuals on low sodium food lists and a fluid restriction review.  RD reviewed with patient, will monitor for any questions.  Patient's  did report patient eating soup twice per day, they thought it was lower in sodium than Liu's.  RD encouraged to double check label.  RD encouraged protein sources each meal and if contain sodium to balance with lower sodium foods.    Nutrition Related Findings:    last BM on 2/28; BUN/Creat elevated this am Wound Type: Wound Consult Pending (redness and excoriation)       Current Nutrition Intake & Therapies:    Average Meal Intake: %  Average Supplements Intake: Unable to assess  ADULT DIET; Regular; Low Fat/Low Chol/High Fiber/2 gm Na; 1500 ml  ADULT ORAL NUTRITION SUPPLEMENT; Breakfast, Lunch, Dinner; Standard High Calorie/High Protein Oral Supplement    Anthropometric Measures:  Height: 162.6 cm (5' 4\")  Ideal Body Weight (IBW): 120 lbs (55 kg)       Current Body Weight: 107.5 kg (236 lb 15.9 oz),   IBW. Weight Source: Bed Scale  Current BMI

## 2024-03-01 NOTE — PROGRESS NOTES
Physical Therapy  Facility/Department: [address] PROGRESSIVE CARE  Physical Therapy Initial Assessment    Name: Janae Perez  : 1945  MRN: [id number]  Date of Service: 3/1/2024    Discharge Recommendations:  Therapy recommended at discharge, Continue to assess pending progress   PT Equipment Recommendations  Equipment Needed: No  Other: Pt. already has DME.    Janae Perez scored a 17/24 on the AM-PAC short mobility form. Based on the patient's AM-PAC score and their current functional mobility deficits, it is recommended that the patient have 3-5 sessions per week of Physical Therapy at d/c to increase the patient's independence.  Please see assessment section for further patient specific details.    If patient discharges prior to next session this note will serve as a discharge summary.  Please see below for the latest assessment towards goals.         Patient Diagnosis(es): The primary encounter diagnosis was Acute cystitis without hematuria. Diagnoses of Acute on chronic congestive heart failure, unspecified heart failure type (HCC) and Goals of care, counseling/discussion were also pertinent to this visit.  Past Medical History:  has a past medical history of AAA (abdominal aortic aneurysm) (HCC), AAA (abdominal aortic aneurysm) without rupture (HCC), Atrial fibrillation (HCC), CAD (coronary artery disease), CHF (congestive heart failure) (HCC), COPD (chronic obstructive pulmonary disease) (HCC), History of blood clots, Hyperlipidemia, and Hypertension.  Past Surgical History:  has a past surgical history that includes Colonoscopy (2007); Hysterectomy (); Appendectomy (); bladder suspension; Cataract removal; Tonsillectomy (as a child); tumor excision; Cholecystectomy (10/15/2013); Colonoscopy (2017); joint replacement (2013); Abdominal aortic aneurysm repair; Cardiac surgery; Cardiac catheterization (Right, 2022); Upper gastrointestinal endoscopy (N/A, 2023);

## 2024-03-01 NOTE — DISCHARGE INSTRUCTIONS
Extra Heart Failure sites:     https://Propertygate.com/publication/?h=043325   --- this is American Heart Association interactive Healthier Living with Heart Failure guidebook.  Please click hyperlink or copy / paste link into search bar. Use your mouse to scroll through the pages.  Lots of information about weight monitoring, diet tips, activity, meds, etc    HF [place] maxwell  -- this is a free smart phone maxwell available for iPhone and Android download.  Use your phone to track sodium / fluid intake, zone tool symptom tracking, weights, medications, etc. Click on this hyperlink  HF [place] Maxwell   for QR code for easy download.      DASH (Dietary Approach to Stop Hypertension) diet --  https://www.nhlbi.nih.gov/education/dash-eating-plan -- this diet is a flexible eating plan that promotes heart healthy eating style.  Click on hyperlink or copy / paste link into search bar.  Lots of low sodium recipes and tips.    https://www.[employer]/recipes  -- more free recipes

## 2024-03-01 NOTE — ED NOTES
Report given to CABRERA Dwyer . She verbalized understanding of patient condition and has no further questions.

## 2024-03-01 NOTE — H&P
History and Physical      Name:  Janae Perez DOB/Age/Sex: 1945  (79 y.o. female)   MRN & CSN:  [id number] & [id number] Encounter Date/Time: 2024 8:28 PM EST   Location:  [address] [place] [address] PCP: Boubacar Dhillon MD       Hospital Day: 1    Assessment and Plan:     #.  Acute on chronic respiratory failure with hypoxia and hypercapnia  #.  COPD exacerbation  -VBG-pH 7.443, pCO2 53.7, pO2<30, HCO3 37.  -Chest x-ray-no acute process, stable cardiomegaly.  -Influenza a and B, rapid COVID-negative.  -Patient received DuoNeb, IV methylprednisolone in ER  -Continue p.o. prednisone, DuoNeb    #.  Acute on chronic diastolic congestive heart failure  -Echo-2024-EF is 65-70%.  -As per  patient's torsemide was recently increased.  -proBNP 5098, bilateral lower extremity edema  -Patient received IV Lasix in ER  -Continue IV Lasix twice daily  -Strict input/output, daily weight  -Consult cardiology.    #.  Detectable troponin  -Troponin 109, 120  -Patient complaining of shortness of breath, no chest pain.  -EKG with T wave inversions in inferior leads.  -Trend troponin, repeat EKG  -Consult cardiology    # Hypertensive urgency.  -Patient's blood pressure 145-207/ in ED  -Patient received clonidine 0.1 mg, Lasix 40 mg in ED  -Resume home medications-clonidine 0.1 mg 3 times daily, hydralazine 50 mg 3 times daily, Cardizem, IMN    #.  UTI  -Urine culture in process  -Continue ceftriaxone    #.  Recent admission -2024 with similar complaints.    #.  Coronary artery disease    #.  COPD, chronic respiratory failure on home oxygen at 2 L/min.    #.  Chronic atrial fibrillation-on Xarelto  -Patient is on Cardizem    #.  CKD stage III  -Patient is on atorvastatin, IMN, hydralazine  -Not on ACEI due to CKD    #.  History of C. difficile colitis/sepsis-2023  -Patient was admitted to [facility] -11/15/2023    #.  History of AAA repair as per documentation    #. ?  Cognitive impairment  - reports  for 37.0 years (37.0 ttl pk-yrs)     Types: Cigarettes     Start date: 12/2/1976     Quit date: 9/17/2013     Years since quitting: 10.4     Passive exposure: Past    Smokeless tobacco: Former    Tobacco comments:     E cigarette now and then   Vaping Use    Vaping Use: Never used   Substance and Sexual Activity    Alcohol use: No    Drug use: No    Sexual activity: Yes     Partners: Male     Social Determinants of Health     Financial Resource Strain: Low Risk  (4/6/2023)    Overall Financial Resource Strain (CARDIA)     Difficulty of Paying Living Expenses: Not hard at all   Food Insecurity: No Food Insecurity (2/14/2024)    Hunger Vital Sign     Worried About Running Out of Food in the Last Year: Never true     Ran Out of Food in the Last Year: Never true   Transportation Needs: No Transportation Needs (2/14/2024)    PRAPARE - Transportation     Lack of Transportation (Medical): No     Lack of Transportation (Non-Medical): No   Housing Stability: Low Risk  (2/14/2024)    Housing Stability Vital Sign     Unable to Pay for Housing in the Last Year: No     Number of Places Lived in the Last Year: 1     Unstable Housing in the Last Year: No       Medications:   Medications:    Infusions:   PRN Meds:     Labs      CBC:   Recent Labs     02/27/24  1154 02/29/24  1622   WBC 13.8* 9.5   HGB 9.9* 9.0*    206     BMP:    Recent Labs     02/27/24  1154 02/29/24  1622    140   K 3.7 3.8   CL 95* 96*   CO2 33* 36*   BUN 34* 35*   CREATININE 1.5* 1.7*   GLUCOSE 111* 87     Hepatic:   Recent Labs     02/27/24  1154 02/29/24  1622   AST 16 15   ALT 13 12   BILITOT 0.5 0.4   ALKPHOS 92 80     Lipids:   Lab Results   Component Value Date/Time    CHOL 137 02/14/2024 04:34 AM    HDL 62 02/14/2024 04:34 AM    HDL 38 09/09/2011 03:35 PM    TRIG 48 02/14/2024 04:34 AM     Hemoglobin A1C:   Lab Results   Component Value Date/Time    LABA1C 5.4 01/11/2024 01:36 PM     TSH:   Lab Results   Component Value Date/Time    TSH

## 2024-03-01 NOTE — ACP (ADVANCE CARE PLANNING)
Advance Care Planning     Advance Care Planning Activator (Inpatient)  Conversation Note      Date of ACP Conversation: 3/1/2024     Conversation Conducted with: Patient with Decision Making Capacity    ACP Activator: Jeannie Hernandez RN    Health Care Decision Maker:     Current Designated Health Care Decision Maker:     Primary Decision Maker: Linda Perez D - Spouse - [phone]    Secondary Decision Maker: Anastacio Perez - [phone]    Care Preferences    Ventilation:  \"If you were in your present state of health and suddenly became very ill and were unable to breathe on your own, what would your preference be about the use of a ventilator (breathing machine) if it were available to you?\"      Would the patient desire the use of ventilator (breathing machine)?: Unsure    \"If your health worsens and it becomes clear that your chance of recovery is unlikely, what would your preference be about the use of a ventilator (breathing machine) if it were available to you?\"     Would the patient desire the use of ventilator (breathing machine)?: No      Resuscitation  \"CPR works best to restart the heart when there is a sudden event, like a heart attack, in someone who is otherwise healthy. Unfortunately, CPR does not typically restart the heart for people who have serious health conditions or who are very sick.\"    \"In the event your heart stopped as a result of an underlying serious health condition, would you want attempts to be made to restart your heart (answer \"yes\" for attempt to resuscitate) or would you prefer a natural death (answer \"no\" for do not attempt to resuscitate)?\" no       [] Yes   [x] No   Educated Patient / Decision Maker regarding differences between Advance Directives and portable DNR orders.    Length of ACP Conversation in minutes: 5 minutes     Conversation Outcomes:  ACP discussion completed    Follow-up plan:    [x] Schedule follow-up conversation to continue planning  [] Referred

## 2024-03-01 NOTE — CARE COORDINATION
Case Management Assessment  Initial Evaluation    Date/Time of Evaluation: 3/1/2024 10:21 AM  Assessment Completed by: Jeannie Heranndez RN    If patient is discharged prior to next notation, then this note serves as note for discharge by case management.    Patient Name: Janae Perez                   YOB: 1945  Diagnosis: Goals of care, counseling/discussion [Z71.89]  Acute cystitis without hematuria [N30.00]  Acute on chronic respiratory failure with hypoxia and hypercapnia (HCC) [J96.21, J96.22]  Acute on chronic congestive heart failure, unspecified heart failure type (HCC) [I50.9]                   Date / Time: 2/29/2024  3:59 PM    Patient Admission Status: Inpatient   Readmission Risk (Low < 19, Mod (19-27), High > 27): Readmission Risk Score: 37    Current PCP: Boubacar Dhillon MD  PCP verified by CM? Yes    Chart Reviewed: Yes      History Provided by: Patient, Spouse  Patient Orientation: Alert and Oriented    Patient Cognition: Alert    Hospitalization in the last 30 days (Readmission):  Yes    If yes, Readmission Assessment in  Navigator will be completed.    Advance Directives:      Code Status: Limited   Patient's Primary Decision Maker is: Legal Next of Kin    Primary Decision Maker: Linda Perez HUAN - Spouse - [phone]    Secondary Decision Maker: SrikanthelisaAnastacio Humphrey Child - [phone]    Discharge Planning:    Patient lives with: Spouse/Significant Other Type of Home: Trailer/Mobile Home  Primary Care Giver: Self  Patient Support Systems include: Spouse/Significant Other, Children   Current Financial resources: Medicare  Current community resources: ECF/Home Care  Current services prior to admission: Durable Medical Equipment, Home Care, Oxygen Therapy (active with [facility] ([facility]            Current DME: Wheelchair, Walker, Oxygen Therapy (Comment), Other (Comment) (uses 2-3L -- has portable Inogen concentrator, has BP machine/cuff)            Type of Home Care

## 2024-03-01 NOTE — CARE COORDINATION
03/01/24 1025   Readmission Assessment   Number of Days since last admission? 8-30 days   Previous Disposition Home with Home Health   Who is being Interviewed Patient  (& spouse)   What was the patient's/caregiver's perception as to why they think they needed to return back to the hospital? Other (Comment)  (shortness of breath)   Did you visit your Primary Care Physician after you left the hospital, before you returned this time? Yes   Did you see a specialist, such as Cardiac, Pulmonary, Orthopedic Physician, etc. after you left the hospital? No   Who advised the patient to return to the hospital? Self-referral   Does the patient report anything that got in the way of taking their medications? No   In our efforts to provide the best possible care to you and others like you, can you think of anything that we could have done to help you after you left the hospital the first time, so that you might not have needed to return so soon? Other (Comment)  (Spouse says \"I can't think of anything.\")

## 2024-03-01 NOTE — CARE COORDINATION
Care Transitions Follow Up Call        Patient: Janae Perez  Patient : 1945   MRN: [id number]  Reason for Admission: SOB  Discharge Date: 24 RARS: Readmission Risk Score: 37      Needs to be reviewed by the provider   Additional needs identified to be addressed with provider: No               Method of communication with provider: none.    Upon chart review for 2nd attempt at RPM Welcome Call it is noted that patient is readmitted at [facility].      Follow Up  Future Appointments   Date Time Provider Department Center   3/4/2024  2:45 PM Dejuan Lopez MD [facility]     .ashley

## 2024-03-01 NOTE — CONSULTS
[facility]  Cardiology Consult Note        CC:      Shortness of breath and lower extremity edema             HPI:   This is a 79 y.o. female brought to the hospital for increasing lower extremity edema and shortness of breath despite increasing the dose of her diuretics.  The patient has normal systolic function with severe diastolic dysfunction with multiple heart failure admissions.  The patient's  is in the room and claims that he gives her the medicines regularly and have been progressively increasing the torsemide.  Despite that the patient's retaining fluids.        Past Medical History:   Diagnosis Date    AAA (abdominal aortic aneurysm) (HCC)     pt states it is 4cm    AAA (abdominal aortic aneurysm) without rupture (HCC) 2/10/2015    Atrial fibrillation (HCC)     CAD (coronary artery disease)     CHF (congestive heart failure) (HCC)     COPD (chronic obstructive pulmonary disease) (HCC)     History of blood clots     Hyperlipidemia     Hypertension       Past Surgical History:   Procedure Laterality Date    ABDOMINAL AORTIC ANEURYSM REPAIR      Endovascular abdominal AA    APPENDECTOMY  1990    incidental    BLADDER SUSPENSION      CAPSULE ENDOSCOPY N/A 5/22/2023    ESOPHAGEAL CAPSULE ENDOSCOPY performed by Mishel De Anda MD at [facility] ENDOSCOPY    CARDIAC CATHETERIZATION Right 11/28/2022    Cardiomems PA sensor device implant Left PA    CARDIAC SURGERY      CATARACT REMOVAL      CHOLECYSTECTOMY  10/15/2013    COLECTOMY N/A 5/26/2023    LAPAROSCOPIC ASSISTED LYSIS OF ADHESIONS AND PUSH ENTEROSCOPY performed by Lokesh Fraser MD at [facility] OR    COLONOSCOPY  09/02/2007    dr park and check in 5 years.     COLONOSCOPY  06/16/2017    ok dr arndt, repeat 5 years    COLONOSCOPY N/A 5/10/2023    COLONOSCOPY performed by Shade Daily MD at [facility] ENDOSCOPY    HYSTERECTOMY (CERVIX STATUS UNKNOWN)  1990    for benign tumor. just the uterus    JOINT REPLACEMENT  12/2013    right knee

## 2024-03-01 NOTE — CARE COORDINATION
[facility]  Patient is active with [facility] for nurse, PT/OT.  Will follow for discharge to home with orders to resume care.      Ray Duggan RN, BSN CTN  [facility] [phone]

## 2024-03-01 NOTE — PLAN OF CARE
Problem: Discharge Planning  Goal: Discharge to home or other facility with appropriate resources  Outcome: Progressing     Problem: Pain  Goal: Verbalizes/displays adequate comfort level or baseline comfort level  Outcome: Progressing  Flowsheets (Taken 2/29/2024 2143)  Verbalizes/displays adequate comfort level or baseline comfort level:   Encourage patient to monitor pain and request assistance   Assess pain using appropriate pain scale   Administer analgesics based on type and severity of pain and evaluate response   Implement non-pharmacological measures as appropriate and evaluate response     Problem: Safety - Adult  Goal: Free from fall injury  Outcome: Progressing     Problem: ABCDS Injury Assessment  Goal: Absence of physical injury  Outcome: Progressing     Problem: Skin/Tissue Integrity  Goal: Absence of new skin breakdown  Description: 1.  Monitor for areas of redness and/or skin breakdown  2.  Assess vascular access sites hourly  3.  Every 4-6 hours minimum:  Change oxygen saturation probe site  4.  Every 4-6 hours:  If on nasal continuous positive airway pressure, respiratory therapy assess nares and determine need for appliance change or resting period.  Outcome: Progressing

## 2024-03-01 NOTE — CARE COORDINATION
[facility] Nurse  Consult Note       NAME:  Janae Perez  MEDICAL RECORD NUMBER:  [id number]  AGE: 79 y.o.   GENDER: female  : 1945  TODAY'S DATE:  3/1/2024    Subjective   Reason for WOCN Evaluation and Assessment: BLE blisters. Pt does not have blisters to BLE, does have edema, R>L. Please re-consult if needed.        Janae Perez is a 79 y.o. female referred by:   [] Physician  [x] Nursing  [] Other:         PAST MEDICAL HISTORY        Diagnosis Date    AAA (abdominal aortic aneurysm) (HCC)     pt states it is 4cm    AAA (abdominal aortic aneurysm) without rupture (HCC) 2/10/2015    Atrial fibrillation (HCC)     CAD (coronary artery disease)     CHF (congestive heart failure) (HCC)     COPD (chronic obstructive pulmonary disease) (HCC)     History of blood clots     Hyperlipidemia     Hypertension        PAST SURGICAL HISTORY    Past Surgical History:   Procedure Laterality Date    ABDOMINAL AORTIC ANEURYSM REPAIR      Endovascular abdominal AA    APPENDECTOMY      incidental    BLADDER SUSPENSION      CAPSULE ENDOSCOPY N/A 2023    ESOPHAGEAL CAPSULE ENDOSCOPY performed by Mishel De Anda MD at [facility] ENDOSCOPY    CARDIAC CATHETERIZATION Right 2022    Cardiomems PA sensor device implant Left PA    CARDIAC SURGERY      CATARACT REMOVAL      CHOLECYSTECTOMY  10/15/2013    COLECTOMY N/A 2023    LAPAROSCOPIC ASSISTED LYSIS OF ADHESIONS AND PUSH ENTEROSCOPY performed by Lokesh Fraser MD at [facility] OR    COLONOSCOPY  2007    dr park and check in 5 years.     COLONOSCOPY  2017    ok dr arndt, repeat 5 years    COLONOSCOPY N/A 5/10/2023    COLONOSCOPY performed by Shade Daily MD at [facility] ENDOSCOPY    HYSTERECTOMY (CERVIX STATUS UNKNOWN)      for benign tumor. just the uterus    JOINT REPLACEMENT  2013    right knee replacement    TONSILLECTOMY  as a child    TUMOR EXCISION      benign behind right ear about     UPPER

## 2024-03-01 NOTE — CONSULTS
[facility]  Cardiology Consult Note    Janae Perez  1945 79 y.o.        CC:  Shortness of breath    ***             HPI:   This is a 79 y.o. female  with COPD, chronic respiratory failure on home oxygen, chronic atrial fibrillation, on anticoagulation, chronic diastolic congestive heart failure, bilateral lower extremity venous stasis ulcers, CKD stage III, history of C. difficile colitis/sepsis presented to ED with complaints of shortness of breath.  As per EMS documentation patient was placed on 10 L of oxygen via NRB and brought to ER.  Patient's L-spine also reported that patient has been short of breath for the last 2-3 days.  Progressively getting worse.  Patient's blood pressure was high with SBP 200s today.  Patient denies any chest pain, fever, chills, cough, denies any urinary complaints, denies any constipation or diarrhea.  Patient is able to answer simple questions.  She knew her age, date of birth, knew her home address.  Did not know the year, month, knew that she was in the hospital.  Following commands appropriately.       Past Medical History:   Diagnosis Date    AAA (abdominal aortic aneurysm) (HCC)     pt states it is 4cm    AAA (abdominal aortic aneurysm) without rupture (HCC) 2/10/2015    Atrial fibrillation (HCC)     CAD (coronary artery disease)     CHF (congestive heart failure) (HCC)     COPD (chronic obstructive pulmonary disease) (HCC)     History of blood clots     Hyperlipidemia     Hypertension       Past Surgical History:   Procedure Laterality Date    ABDOMINAL AORTIC ANEURYSM REPAIR      Endovascular abdominal AA    APPENDECTOMY  1990    incidental    BLADDER SUSPENSION      CAPSULE ENDOSCOPY N/A 5/22/2023    ESOPHAGEAL CAPSULE ENDOSCOPY performed by Mishel De Anda MD at [facility] ENDOSCOPY    CARDIAC CATHETERIZATION Right 11/28/2022    Cardiomems PA sensor device implant Left PA    CARDIAC SURGERY      CATARACT REMOVAL      CHOLECYSTECTOMY  10/15/2013    COLECTOMY N/A   patient's torsemide was recently increased.  -proBNP 5098, bilateral lower extremity edema  -Patient received IV Lasix in ER  -Continue IV Lasix twice daily  -Strict input/output, daily weight  -Consult cardiology.     #.  Detectable troponin  -Troponin 109, 120  -Patient complaining of shortness of breath, no chest pain.  -EKG with T wave inversions in inferior leads.  -Trend troponin, repeat EKG  -Consult cardiology     # Hypertensive urgency.  -Patient's blood pressure 145-207/ in ED  -Patient received clonidine 0.1 mg, Lasix 40 mg in ED  -Resume home medications-clonidine 0.1 mg 3 times daily, hydralazine 50 mg 3 times daily, Cardizem, IMN     #.  UTI  -Urine culture in process  -Continue ceftriaxone     #.  Recent admission 2/13-2/20/2024 with similar complaints.     #.  Coronary artery disease     #.  COPD, chronic respiratory failure on home oxygen at 2 L/min.     #.  Chronic atrial fibrillation-on Xarelto  -Patient is on Cardizem     #.  CKD stage III  -Patient is on atorvastatin, IMN, hydralazine  -Not on ACEI due to CKD     #.  History of C. difficile colitis/sepsis-11/2023  -Patient was admitted to [facility] 11/8-11/15/2023     #.  History of AAA repair as per documentation     #. ?  Cognitive impairment  - reports patient has short-term memory issues intermittently.    Follow up in office in ***     Thank you very much for allowing me to participate in the care of your patient. Please do not hesitate to contact me if you have any questions.      KASHMIR Godwin M.D  3/1/2024

## 2024-03-01 NOTE — PROGRESS NOTES
4 Eyes Skin Assessment     NAME:  Janae Perez  YOB: 1945  MEDICAL RECORD NUMBER:  [id number]    The patient is being assessed for  Admission    I agree that at least one RN has performed a thorough Head to Toe Skin Assessment on the patient. ALL assessment sites listed below have been assessed.      Areas assessed by both nurses:    Head, Face, Ears, Shoulders, Back, Chest, Arms, Elbows, Hands, Sacrum. Buttock, Coccyx, Ischium, Legs. Feet and Heels, and Under Medical Devices         Does the Patient have a Wound? No noted wound(s)       Braydon Prevention initiated by RN: Yes  Wound Care Orders initiated by RN: No    Pressure Injury (Stage 3,4, Unstageable, DTI, NWPT, and Complex wounds) if present, place Wound referral order by RN under : No    New Ostomies, if present place, Ostomy referral order under : No     Nurse 1 eSignature: Electronically signed by Yuliya Stoner RN on 3/1/24 at 1:34 AM EST    **SHARE this note so that the co-signing nurse can place an eSignature**    Nurse 2 eSignature: Electronically signed by Kelly Calvert RN on 3/1/24 at 2:19 AM EST

## 2024-03-01 NOTE — PROGRESS NOTES
Hospitalist   Progress Note    Patient Name: Janae Perez  PCP: Boubacar Dhillon MD  Date of Admission: 2/29/2024    Chief Complaint on Admission: Shortness of Breath   Chief diagnosis after evaluation: COPD exacerbation, acute on chronic diastolic congestive heart failure, hypertensive urgency, acute on chronic respiratory failure with hypoxia and hypercapnia     Brief Synopsis: Patient is a 79 y.o. woman with a medical history that includes  has a past medical history of AAA (abdominal aortic aneurysm) ([facility]), AAA (abdominal aortic aneurysm) without rupture ([facility]), Atrial fibrillation ([facility]), CAD (coronary artery disease), CHF (congestive heart failure) ([facility]), COPD (chronic obstructive pulmonary disease) ([facility]), History of blood clots, Hyperlipidemia, and Hypertension who was admitted on 2/29/2024 for evaluation and treatment of a COPD exacerbation, acute on chronic diastolic congestive heart failure, hypertensive urgency, acute on chronic respiratory failure with hypoxia and hypercapnia     Pt Seen/Examined and Chart Reviewed.     Subjective: Pt is feeling better and has no new complaints. Hopes to be discharged soon.     Objective:  Allergies  Morphine    Medications    Scheduled Meds:   sodium chloride flush  5-40 mL IntraVENous 2 times per day    furosemide  40 mg IntraVENous BID    allopurinol  100 mg Oral Daily    atorvastatin  40 mg Oral Daily    mometasone-formoterol  2 puff Inhalation BID RT    cloNIDine  0.1 mg Oral TID    hydrALAZINE  50 mg Oral 3 times per day    isosorbide mononitrate  120 mg Oral Daily    magnesium oxide  400 mg Oral Lunch    therapeutic multivitamin-minerals  1 tablet Oral Daily    lactobacillus  1 capsule Oral BID    rivaroxaban  15 mg Oral Daily with breakfast    predniSONE  40 mg Oral Daily    ipratropium 0.5 mg-albuterol 2.5 mg  1 Dose Inhalation Q4H WA RT    cefTRIAXone (ROCEPHIN) IV  1,000 mg IntraVENous Q24H     Infusions:   sodium chloride       PRN Meds:  benzocaine-menthol,

## 2024-03-01 NOTE — PROGRESS NOTES
2125 Report received from CABRERA Avendano. No further questions at this time.    2145 Pt arrived to floor via stretcher from ED and transferred to bed. Telemetry activated. Patient oriented to room and use of call light. Call light and personal items within reach. Admission and assessment initiated. POC and education initiated and reviewed with patient. No distress noted on patient exam. Denied further needs or questions at this time. Plan of care ongoing.

## 2024-03-01 NOTE — DISCHARGE INSTR - COC
Acute pulmonary edema (HCC) J81.0    Elevated brain natriuretic peptide (BNP) level R79.89    Hypoxia R09.02    Stage 3b chronic kidney disease ([facility]) N18.32    Type 2 diabetes mellitus E11.9    Dyspnea R06.00    Acute congestive heart failure, unspecified heart failure type ([facility]) I50.9    Non-STEMI (non-ST elevated myocardial infarction) ([facility]) I21.4    Acute suppurative parotitis K11.21    Leukocytosis D72.829    Stage 3a chronic kidney disease ([facility]) N18.31    Severe obesity (BMI 35.0-39.9) with comorbidity ([facility]) E66.01    Thrush, oral B37.0    MRSA (methicillin resistant Staphylococcus aureus) colonization Z22.322    Chronic respiratory failure with hypercapnia ([facility]) J96.12    Debility R53.81    Colitis K52.9    C. difficile colitis A04.72    Abdominal pain R10.9    Urinary retention R33.9    Neutrophilia D72.9    Acute on chronic combined systolic (congestive) and diastolic (congestive) heart failure ([facility]) I50.43    Acute on chronic respiratory failure with hypoxia and hypercapnia ([facility]) J96.21, J96.22       Isolation/Infection:   Isolation            Contact          Patient Infection Status       Infection Onset Added Last Indicated Last Indicated By Review Planned Expiration Resolved Resolved By    MRSA 10/30/18 03/27/23 09/06/23 MRSA DNA Probe, Nasal        Added from external infection.    Resolved    C-diff (Clostridium difficile) 23 C. difficile toxin Molecular   23 Infection                        Nurse Assessment:  Last Vital Signs: BP (!) 159/99   Pulse 99   Temp 97.5 °F (36.4 °C) (Oral)   Resp 26   Ht 1.626 m (5' 4\")   Wt 107.5 kg (236 lb 15.9 oz)   SpO2 100%   BMI 40.68 kg/m²     Last documented pain score (0-10 scale): Pain Level: 0  Last Weight:   Wt Readings from Last 1 Encounters:   24 107.5 kg (236 lb 15.9 oz)     Mental Status:  oriented and alert    IV Access:  - None    Nursing Mobility/ADLs:  Walking   Assisted  Transfer  Assisted  Bathing   breath, lower extremity or general edema/swelling, abdominal bloating/swelling, inability to lie flat, intolerance to usual activity, or cough (especially at night). Report these finding even if no increase in weight.  Dehydration:  having difficulty or a decrease in urination, dizziness, worsening fatigue, or new onset/worsening of generalized weakness.     Please continue a LOW SODIUM diet and LIMIT fluid intake to 48 - 64 ounces ( 1.5 - 2 liters) per day.     Call Boubacar Dhillon [phone], [facility] MD, and [facility] [phone] and/or [facility] Heart Failure Resource Line @ [phone] with any questions or concerns.   Please continue heart failure education to patient and family/support system.  See After Visit Summary for hospital follow up appointment details.  Consider spiritual care referral for support and/or completion of advance directives [phone].  Consider: having the [facility] MD complete required 7 day follow up, [facility] telehealth program if patient agreeable and able to participate, and palliative care consult for ongoing goals of care, end of life, and/or chronic disease management discussions.  ***     Patient's personal belongings (please select all that are sent with patient):  {[facility] DME Belongings:[id number]}    RN SIGNATURE:  Electronically signed by Ana Paula Bond RN on 3/3/24 at 11:17 AM EST    CASE MANAGEMENT/SOCIAL WORK SECTION    Inpatient Status Date: 2/29/24    Readmission Risk Assessment Score:  Readmission Risk              Risk of Unplanned Readmission:  62           Discharging to Facility/ Agency   Name:  [facility]    Address: [address] [place] [address], [address] [place] [address]  Phone: [phone]  Fax: [phone]       / signature: Electronically signed by CONNOR Corona, ALEXANDREAW on 3/3/24 at 9:07 AM EST    PHYSICIAN SECTION    Prognosis: Fair    Condition at Discharge: Stable    Rehab Potential (if transferring to

## 2024-03-02 LAB
ANION GAP SERPL CALCULATED.3IONS-SCNC: 7 MMOL/L (ref 3–16)
BUN SERPL-MCNC: 42 MG/DL (ref 7–20)
CALCIUM SERPL-MCNC: 8.9 MG/DL (ref 8.3–10.6)
CHLORIDE SERPL-SCNC: 93 MMOL/L (ref 99–110)
CO2 SERPL-SCNC: 38 MMOL/L (ref 21–32)
CREAT SERPL-MCNC: 1.7 MG/DL (ref 0.6–1.2)
GFR SERPLBLD CREATININE-BSD FMLA CKD-EPI: 30 ML/MIN/{1.73_M2}
GLUCOSE SERPL-MCNC: 165 MG/DL (ref 70–99)
MAGNESIUM SERPL-MCNC: 2.4 MG/DL (ref 1.8–2.4)
POTASSIUM SERPL-SCNC: 3.4 MMOL/L (ref 3.5–5.1)
SODIUM SERPL-SCNC: 138 MMOL/L (ref 136–145)

## 2024-03-02 PROCEDURE — 36415 COLL VENOUS BLD VENIPUNCTURE: CPT

## 2024-03-02 PROCEDURE — 6360000002 HC RX W HCPCS: Performed by: INTERNAL MEDICINE

## 2024-03-02 PROCEDURE — 2580000003 HC RX 258: Performed by: INTERNAL MEDICINE

## 2024-03-02 PROCEDURE — 6370000000 HC RX 637 (ALT 250 FOR IP): Performed by: INTERNAL MEDICINE

## 2024-03-02 PROCEDURE — 83735 ASSAY OF MAGNESIUM: CPT

## 2024-03-02 PROCEDURE — 94640 AIRWAY INHALATION TREATMENT: CPT

## 2024-03-02 PROCEDURE — 2700000000 HC OXYGEN THERAPY PER DAY

## 2024-03-02 PROCEDURE — 2060000000 HC ICU INTERMEDIATE R&B

## 2024-03-02 PROCEDURE — 99233 SBSQ HOSP IP/OBS HIGH 50: CPT | Performed by: NURSE PRACTITIONER

## 2024-03-02 PROCEDURE — 80048 BASIC METABOLIC PNL TOTAL CA: CPT

## 2024-03-02 PROCEDURE — 94760 N-INVAS EAR/PLS OXIMETRY 1: CPT

## 2024-03-02 RX ADMIN — IPRATROPIUM BROMIDE AND ALBUTEROL SULFATE 1 DOSE: 2.5; .5 SOLUTION RESPIRATORY (INHALATION) at 08:08

## 2024-03-02 RX ADMIN — Medication 10 ML: at 08:59

## 2024-03-02 RX ADMIN — HYDRALAZINE HYDROCHLORIDE 50 MG: 50 TABLET ORAL at 22:22

## 2024-03-02 RX ADMIN — IPRATROPIUM BROMIDE AND ALBUTEROL SULFATE 1 DOSE: 2.5; .5 SOLUTION RESPIRATORY (INHALATION) at 20:19

## 2024-03-02 RX ADMIN — IPRATROPIUM BROMIDE AND ALBUTEROL SULFATE 1 DOSE: 2.5; .5 SOLUTION RESPIRATORY (INHALATION) at 15:41

## 2024-03-02 RX ADMIN — CLONIDINE HYDROCHLORIDE 0.1 MG: 0.1 TABLET ORAL at 08:55

## 2024-03-02 RX ADMIN — ALLOPURINOL 100 MG: 100 TABLET ORAL at 08:55

## 2024-03-02 RX ADMIN — Medication 1 CAPSULE: at 20:36

## 2024-03-02 RX ADMIN — Medication 400 MG: at 11:38

## 2024-03-02 RX ADMIN — CLONIDINE HYDROCHLORIDE 0.1 MG: 0.1 TABLET ORAL at 15:49

## 2024-03-02 RX ADMIN — HYDRALAZINE HYDROCHLORIDE 50 MG: 50 TABLET ORAL at 15:49

## 2024-03-02 RX ADMIN — FUROSEMIDE 40 MG: 10 INJECTION, SOLUTION INTRAMUSCULAR; INTRAVENOUS at 08:56

## 2024-03-02 RX ADMIN — IPRATROPIUM BROMIDE AND ALBUTEROL SULFATE 1 DOSE: 2.5; .5 SOLUTION RESPIRATORY (INHALATION) at 11:39

## 2024-03-02 RX ADMIN — MOMETASONE FUROATE AND FORMOTEROL FUMARATE DIHYDRATE 2 PUFF: 100; 5 AEROSOL RESPIRATORY (INHALATION) at 20:19

## 2024-03-02 RX ADMIN — ATORVASTATIN CALCIUM 40 MG: 40 TABLET, FILM COATED ORAL at 08:55

## 2024-03-02 RX ADMIN — HYDRALAZINE HYDROCHLORIDE 50 MG: 50 TABLET ORAL at 05:56

## 2024-03-02 RX ADMIN — Medication 1 CAPSULE: at 08:55

## 2024-03-02 RX ADMIN — PREDNISONE 40 MG: 20 TABLET ORAL at 08:55

## 2024-03-02 RX ADMIN — RIVAROXABAN 15 MG: 15 TABLET, FILM COATED ORAL at 08:55

## 2024-03-02 RX ADMIN — Medication 1 TABLET: at 08:55

## 2024-03-02 RX ADMIN — ISOSORBIDE MONONITRATE 120 MG: 60 TABLET, EXTENDED RELEASE ORAL at 08:56

## 2024-03-02 RX ADMIN — Medication 10 ML: at 20:36

## 2024-03-02 RX ADMIN — WATER 1000 MG: 1 INJECTION INTRAMUSCULAR; INTRAVENOUS; SUBCUTANEOUS at 16:07

## 2024-03-02 RX ADMIN — CLONIDINE HYDROCHLORIDE 0.1 MG: 0.1 TABLET ORAL at 20:36

## 2024-03-02 RX ADMIN — MOMETASONE FUROATE AND FORMOTEROL FUMARATE DIHYDRATE 2 PUFF: 100; 5 AEROSOL RESPIRATORY (INHALATION) at 08:09

## 2024-03-02 NOTE — PLAN OF CARE
Problem: Discharge Planning  Goal: Discharge to home or other facility with appropriate resources  3/1/2024 2039 by Yuliya Stoner RN  Outcome: Progressing     Problem: Pain  Goal: Verbalizes/displays adequate comfort level or baseline comfort level  3/1/2024 2039 by Yuliya Stoner RN  Outcome: Progressing     Problem: Safety - Adult  Goal: Free from fall injury  3/1/2024 2039 by Yuliya Stoner RN  Outcome: Progressing     Problem: ABCDS Injury Assessment  Goal: Absence of physical injury  3/1/2024 2039 by Yuliya Stoner RN  Outcome: Progressing     Problem: Skin/Tissue Integrity  Goal: Absence of new skin breakdown  Description: 1.  Monitor for areas of redness and/or skin breakdown  2.  Assess vascular access sites hourly  3.  Every 4-6 hours minimum:  Change oxygen saturation probe site  4.  Every 4-6 hours:  If on nasal continuous positive airway pressure, respiratory therapy assess nares and determine need for appliance change or resting period.  Outcome: Progressing     Problem: Chronic Conditions and Co-morbidities  Goal: Patient's chronic conditions and co-morbidity symptoms are monitored and maintained or improved  Outcome: Progressing     Problem: Respiratory - Adult  Goal: Achieves optimal ventilation and oxygenation  Outcome: Progressing  Flowsheets (Taken 3/1/2024 6854 by Demetra Grimm RN)  Achieves optimal ventilation and oxygenation: Assess for changes in respiratory status     Problem: Cardiovascular - Adult  Goal: Maintains optimal cardiac output and hemodynamic stability  Outcome: Progressing     Problem: Cardiovascular - Adult  Goal: Absence of cardiac dysrhythmias or at baseline  Outcome: Progressing     Problem: Metabolic/Fluid and Electrolytes - Adult  Goal: Electrolytes maintained within normal limits  Outcome: Progressing     Problem: Metabolic/Fluid and Electrolytes - Adult  Goal: Hemodynamic stability and optimal renal function maintained  Outcome: Progressing     Problem:

## 2024-03-02 NOTE — PROGRESS NOTES
[facility]   Daily Progress Note      Admit Date:  2/29/2024    CC: SOB    HPI:   Janae Perez is a 79 y.o. female with PMH CAD,AF s/p PVI ablation 10/2020- recurrent AF with DCCV 1/2021 and repeat AF ablation 2/2021 (failed flecaininde), HTN, aortic aneurysm, AAA s/p repair 3/2018 (Dr. Harrison)COPD, DENISE (intolerant to CPAP)..  Abn stress>[facility] 3/2018 revealed  of RCA and non obstructive dz of LAD and LCX.  Adm to [facility] 5/2022 for dofetilide loading, converted to NSR.  Abn Stress followed by [facility] 9/20/2022 showed CAD.  S/P Cardiomems PA sensor implant 11/2022. Non complaint with readings.  GIB 4/2023 & 5/2023 requiring multiple units of PRBC>EGD with gastric AVMs.   Hx UTIs w confusion  Hx parotiditis    Multiple hospitalizations for resp failure and HF   Hospitalized  with COPD and HF2/13-2/20/2024 . PAD readings 30>20 after diuresis.     Presented back to [facility] with worsening SOB and edema and re admitted 3/1/2024  Dr. Godwin consulted.  Diuresing w IV lasix.   Stopped cardizem    Resting in bed and still short of breath  She is on 2 L nasal cannula  Tells me her shortness of breath is improving    Denies chest pain, syncope or palpitations    Review of Systems:   General: Denies fever, chills, fatigue, weakness  Skin: Denies skin changes, rash, itching, lesions.  HEENT: Denies headache, dizziness, vision changes, nosebleeds, sore throat, nasal drainage  RESP: Denies cough, sputum, dyspnea, wheeze, snoring  CARD: Denies palpitations,  murmur  GI:Denies nausea, vomiting, heartburn, loss of appetite, change in bowels  : Denies frequency, pain, incontinence, polyuria  VASC: Denies claudication, leg cramps, clots  MUSC/SKEL: Denies pain, stiffness, arthritis  PSYCH: Denies anxiety, depression, stress  NEURO: Denies numbness, tingling, weakness,change in mood or memory  HEME: Denies abn bruising, bleeding, anemia  ENDO: Denies intolerance to heat, cold, excessive thirst or hunger, hx thyroid  polyethylene glycol, acetaminophen **OR** acetaminophen, potassium chloride **OR** potassium alternative oral replacement **OR** potassium chloride, magnesium sulfate, hydrOXYzine pamoate, hydrALAZINE    Lab Data: I have reviewed all labs below today.   CBC:   Recent Labs     02/29/24 1622 03/01/24  0502   WBC 9.5 10.1   HGB 9.0* 9.5*   HCT 27.2* 27.7*   MCV 90.8 89.3    209     BMP:   Recent Labs     02/29/24 1622 03/01/24  0502 03/02/24  0528    140 138   K 3.8 3.7 3.4*   CL 96* 95* 93*   CO2 36* 34* 38*   BUN 35* 30* 42*   CREATININE 1.7* 1.5* 1.7*     GLUCOSE:   Recent Labs     02/29/24 1622 03/01/24  0502 03/02/24  0528   GLUCOSE 87 168* 165*     LIVER PROFILE:   Lab Results   Component Value Date/Time    AST 15 02/29/2024 04:22 PM    ALT 12 02/29/2024 04:22 PM    LIPASE 7.0 11/02/2023 01:13 PM    AMYLASE 34 10/02/2013 11:46 AM    LABALBU 3.9 02/29/2024 04:22 PM    BILIDIR <0.2 07/14/2015 07:24 AM    BILITOT 0.4 02/29/2024 04:22 PM    ALKPHOS 80 02/29/2024 04:22 PM     PT/INR:   Lab Results   Component Value Date/Time    PROTIME 18.5 09/02/2023 05:48 AM    INR 1.55 09/02/2023 05:48 AM    INR 2.49 05/19/2023 09:23 PM    INR 2.86 05/08/2023 06:51 PM     APTT:   Lab Results   Component Value Date/Time    APTT 32.6 05/19/2023 09:23 PM     Pro-BNP:    Lab Results   Component Value Date/Time    PROBNP 5,098 02/29/2024 04:22 PM    PROBNP 12,383 02/27/2024 11:54 AM    PROBNP 6,732 02/19/2024 05:38 AM     Parameters:   > 450 pg/mL under age 50  > 900 pg/mL ages 50-75  > 1800 pg/mL over age 75      FASTING LIPID PANEL:  Lab Results   Component Value Date/Time    CHOL 137 02/14/2024 04:34 AM    HDL 62 02/14/2024 04:34 AM    HDL 38 09/09/2011 03:35 PM    LDLCALC 65 02/14/2024 04:34 AM    TRIG 48 02/14/2024 04:34 AM     Cardiac Enzymes:   Troponin, High Sensitivity (ng/L)   Date Value   03/01/2024 74 (H)   03/01/2024 83 (H)   02/29/2024 120 (H)   02/29/2024 109 (H)   02/13/2024 82 (H)   02/13/2024 81 (H)

## 2024-03-03 ENCOUNTER — TELEPHONE (OUTPATIENT)
Dept: CARDIOLOGY | Age: 79
End: 2024-03-03

## 2024-03-03 VITALS
OXYGEN SATURATION: 100 % | HEART RATE: 109 BPM | WEIGHT: 242.06 LBS | BODY MASS INDEX: 41.33 KG/M2 | DIASTOLIC BLOOD PRESSURE: 63 MMHG | TEMPERATURE: 98 F | HEIGHT: 64 IN | SYSTOLIC BLOOD PRESSURE: 138 MMHG | RESPIRATION RATE: 18 BRPM

## 2024-03-03 LAB
ANION GAP SERPL CALCULATED.3IONS-SCNC: 12 MMOL/L (ref 3–16)
BACTERIA UR CULT: ABNORMAL
BUN SERPL-MCNC: 43 MG/DL (ref 7–20)
CALCIUM SERPL-MCNC: 8.9 MG/DL (ref 8.3–10.6)
CHLORIDE SERPL-SCNC: 95 MMOL/L (ref 99–110)
CO2 SERPL-SCNC: 33 MMOL/L (ref 21–32)
CREAT SERPL-MCNC: 1.4 MG/DL (ref 0.6–1.2)
GFR SERPLBLD CREATININE-BSD FMLA CKD-EPI: 38 ML/MIN/{1.73_M2}
GLUCOSE SERPL-MCNC: 118 MG/DL (ref 70–99)
MAGNESIUM SERPL-MCNC: 2.6 MG/DL (ref 1.8–2.4)
ORGANISM: ABNORMAL
POTASSIUM SERPL-SCNC: 3.7 MMOL/L (ref 3.5–5.1)
SODIUM SERPL-SCNC: 140 MMOL/L (ref 136–145)

## 2024-03-03 PROCEDURE — 6370000000 HC RX 637 (ALT 250 FOR IP): Performed by: INTERNAL MEDICINE

## 2024-03-03 PROCEDURE — 94760 N-INVAS EAR/PLS OXIMETRY 1: CPT

## 2024-03-03 PROCEDURE — 36415 COLL VENOUS BLD VENIPUNCTURE: CPT

## 2024-03-03 PROCEDURE — 6370000000 HC RX 637 (ALT 250 FOR IP): Performed by: NURSE PRACTITIONER

## 2024-03-03 PROCEDURE — 99232 SBSQ HOSP IP/OBS MODERATE 35: CPT | Performed by: NURSE PRACTITIONER

## 2024-03-03 PROCEDURE — 6360000002 HC RX W HCPCS: Performed by: INTERNAL MEDICINE

## 2024-03-03 PROCEDURE — 80048 BASIC METABOLIC PNL TOTAL CA: CPT

## 2024-03-03 PROCEDURE — 94640 AIRWAY INHALATION TREATMENT: CPT

## 2024-03-03 PROCEDURE — 2580000003 HC RX 258: Performed by: INTERNAL MEDICINE

## 2024-03-03 PROCEDURE — 83735 ASSAY OF MAGNESIUM: CPT

## 2024-03-03 PROCEDURE — 2700000000 HC OXYGEN THERAPY PER DAY

## 2024-03-03 RX ORDER — PREDNISONE 20 MG/1
40 TABLET ORAL DAILY
Qty: 10 TABLET | Refills: 0 | Status: SHIPPED | OUTPATIENT
Start: 2024-03-03 | End: 2024-03-03

## 2024-03-03 RX ORDER — CIPROFLOXACIN 500 MG/1
500 TABLET, FILM COATED ORAL 2 TIMES DAILY
Qty: 10 TABLET | Refills: 0 | Status: SHIPPED | OUTPATIENT
Start: 2024-03-03 | End: 2024-03-08

## 2024-03-03 RX ORDER — PREDNISONE 20 MG/1
40 TABLET ORAL DAILY
Qty: 10 TABLET | Refills: 0 | Status: SHIPPED | OUTPATIENT
Start: 2024-03-03 | End: 2024-03-08

## 2024-03-03 RX ORDER — TORSEMIDE 20 MG/1
20 TABLET ORAL 3 TIMES DAILY
Status: DISCONTINUED | OUTPATIENT
Start: 2024-03-03 | End: 2024-03-03 | Stop reason: HOSPADM

## 2024-03-03 RX ORDER — CIPROFLOXACIN 500 MG/1
500 TABLET, FILM COATED ORAL 2 TIMES DAILY
Qty: 10 TABLET | Refills: 0 | Status: SHIPPED | OUTPATIENT
Start: 2024-03-03 | End: 2024-03-03

## 2024-03-03 RX ORDER — IPRATROPIUM BROMIDE AND ALBUTEROL SULFATE 2.5; .5 MG/3ML; MG/3ML
1 SOLUTION RESPIRATORY (INHALATION) EVERY 4 HOURS PRN
Status: DISCONTINUED | OUTPATIENT
Start: 2024-03-03 | End: 2024-03-03 | Stop reason: HOSPADM

## 2024-03-03 RX ADMIN — Medication 1 CAPSULE: at 08:42

## 2024-03-03 RX ADMIN — IPRATROPIUM BROMIDE AND ALBUTEROL SULFATE 1 DOSE: 2.5; .5 SOLUTION RESPIRATORY (INHALATION) at 09:07

## 2024-03-03 RX ADMIN — IPRATROPIUM BROMIDE AND ALBUTEROL SULFATE 1 DOSE: 2.5; .5 SOLUTION RESPIRATORY (INHALATION) at 11:40

## 2024-03-03 RX ADMIN — ALLOPURINOL 100 MG: 100 TABLET ORAL at 08:42

## 2024-03-03 RX ADMIN — Medication 1 TABLET: at 08:42

## 2024-03-03 RX ADMIN — RIVAROXABAN 15 MG: 15 TABLET, FILM COATED ORAL at 08:42

## 2024-03-03 RX ADMIN — PREDNISONE 40 MG: 20 TABLET ORAL at 08:42

## 2024-03-03 RX ADMIN — Medication 400 MG: at 11:27

## 2024-03-03 RX ADMIN — IPRATROPIUM BROMIDE AND ALBUTEROL SULFATE 1 DOSE: .5; 2.5 SOLUTION RESPIRATORY (INHALATION) at 03:40

## 2024-03-03 RX ADMIN — ISOSORBIDE MONONITRATE 120 MG: 60 TABLET, EXTENDED RELEASE ORAL at 08:42

## 2024-03-03 RX ADMIN — TORSEMIDE 20 MG: 20 TABLET ORAL at 11:27

## 2024-03-03 RX ADMIN — ATORVASTATIN CALCIUM 40 MG: 40 TABLET, FILM COATED ORAL at 08:42

## 2024-03-03 RX ADMIN — HYDRALAZINE HYDROCHLORIDE 10 MG: 20 INJECTION INTRAMUSCULAR; INTRAVENOUS at 03:34

## 2024-03-03 RX ADMIN — CLONIDINE HYDROCHLORIDE 0.1 MG: 0.1 TABLET ORAL at 08:42

## 2024-03-03 RX ADMIN — MOMETASONE FUROATE AND FORMOTEROL FUMARATE DIHYDRATE 2 PUFF: 100; 5 AEROSOL RESPIRATORY (INHALATION) at 09:08

## 2024-03-03 RX ADMIN — Medication 10 ML: at 08:43

## 2024-03-03 NOTE — PLAN OF CARE
Problem: Discharge Planning  Goal: Discharge to home or other facility with appropriate resources  3/3/2024 1100 by Ana Paula Bond RN  Outcome: Adequate for Discharge     Problem: Pain  Goal: Verbalizes/displays adequate comfort level or baseline comfort level  3/3/2024 1100 by Ana Paula Bond RN  Outcome: Adequate for Discharge     Problem: Safety - Adult  Goal: Free from fall injury  3/3/2024 1100 by Ana Paula Bond RN  Outcome: Adequate for Discharge     Problem: ABCDS Injury Assessment  Goal: Absence of physical injury  3/3/2024 1100 by Ana Paula Bond RN  Outcome: Adequate for Discharge     Problem: Skin/Tissue Integrity  Goal: Absence of new skin breakdown  Description: 1.  Monitor for areas of redness and/or skin breakdown  2.  Assess vascular access sites hourly  3.  Every 4-6 hours minimum:  Change oxygen saturation probe site  4.  Every 4-6 hours:  If on nasal continuous positive airway pressure, respiratory therapy assess nares and determine need for appliance change or resting period.  3/3/2024 1100 by Ana Paula Bond RN  Outcome: Adequate for Discharge     Problem: Chronic Conditions and Co-morbidities  Goal: Patient's chronic conditions and co-morbidity symptoms are monitored and maintained or improved  3/3/2024 1100 by Ana Paula Bond RN  Outcome: Adequate for Discharge     Problem: Respiratory - Adult  Goal: Achieves optimal ventilation and oxygenation  3/3/2024 1100 by Ana Paula Bond RN  Outcome: Adequate for Discharge     Problem: Cardiovascular - Adult  Goal: Maintains optimal cardiac output and hemodynamic stability  3/3/2024 1100 by Ana Paula Bond RN  Outcome: Adequate for Discharge     Problem: Cardiovascular - Adult  Goal: Absence of cardiac dysrhythmias or at baseline  3/3/2024 1100 by Ana Paula Bond RN  Outcome: Adequate for Discharge     Problem: Metabolic/Fluid and Electrolytes - Adult  Goal: Electrolytes maintained within normal limits  3/3/2024 1100 by Ana Paula Bond RN  Outcome: Adequate for Discharge

## 2024-03-03 NOTE — TELEPHONE ENCOUNTER
JENNY Weeks on 3.7  If she does not do home cardiomems we can check with wand in office  Please obtain wand from cath lab and have plugged in and ready in room upon pt arrival to clinic

## 2024-03-03 NOTE — PROGRESS NOTES
[facility]   Daily Progress Note      Admit Date:  2/29/2024    CC: SOB    HPI:   Janae Perez is a 79 y.o. female with PMH CAD,AF s/p PVI ablation 10/2020- recurrent AF with DCCV 1/2021 and repeat AF ablation 2/2021 (failed flecaininde), HTN, aortic aneurysm, AAA s/p repair 3/2018 (Dr. Harrison)COPD, DENISE (intolerant to CPAP)..  Abn stress>[facility] 3/2018 revealed  of RCA and non obstructive dz of LAD and LCX.  Adm to [facility] 5/2022 for dofetilide loading, converted to NSR.  Abn Stress followed by [facility] 9/20/2022 showed CAD.  S/P Cardiomems PA sensor implant 11/2022. Non complaint with readings.  GIB 4/2023 & 5/2023 requiring multiple units of PRBC>EGD with gastric AVMs.   Hx UTIs w confusion  Hx parotiditis    Multiple hospitalizations for resp failure and HF   Hospitalized  with COPD and HF2/13-2/20/2024 . PAD readings 30>20 after diuresis.     Presented back to [facility] with worsening SOB and edema and re admitted 3/1/2024  Dr. Godwin consulted.  Diuresing w IV lasix.   Stopped cardizem    Resting in bed and still short of breath with conversation  She is on 2 L nasal cannula  2+ BLE edema  Generalized fatigue and weakness    Denies chest pain, syncope or palpitations    Review of Systems:   General: Denies fever, chills  Skin: Denies skin changes, rash, itching, lesions.  HEENT: Denies headache, dizziness, vision changes, nosebleeds, sore throat, nasal drainage  RESP: Denies cough, sputum, wheeze, snoring  CARD: Denies palpitations,  murmur  GI:Denies nausea, vomiting, heartburn, loss of appetite, change in bowels  : Denies frequency, pain, incontinence, polyuria  VASC: Denies claudication, leg cramps, clots  MUSC/SKEL: Denies pain, stiffness, arthritis  PSYCH: Denies anxiety, depression, stress  NEURO: Denies numbness, tingling, weakness,change in mood or memory  HEME: Denies abn bruising, bleeding, anemia  ENDO: Denies intolerance to heat, cold, excessive thirst or hunger, hx thyroid disease    Objective:    BP (!) 147/71   Pulse (!) 106   Temp 97.7 °F (36.5 °C)   Resp 16   Ht 1.626 m (5' 4\")   Wt 109.8 kg (242 lb 1 oz)   SpO2 100%   BMI 41.55 kg/m²         Intake/Output Summary (Last 24 hours) at 3/3/2024 0831  Last data filed at 3/3/2024 0540  Gross per 24 hour   Intake 480 ml   Output 2800 ml   Net -2320 ml     I/O since adm: Neg 3400    WEIGHT:Admit Weight - Scale: 107.5 kg (236 lb 15.9 oz)         Today  Weight - Scale: 109.8 kg (242 lb 1 oz)   DRY WEIGHT:  Wt Readings from Last 3 Encounters:   03/03/24 109.8 kg (242 lb 1 oz)   02/29/24 96 kg (211 lb 11.2 oz)   02/23/24 104.3 kg (230 lb)       Physical Exam:  GEN: Appears ill   SKIN: Pink, warm, dry.   HEENT: PERRLA.  No adenopathy.  LUNG: AP diameter normal.  Crackles bilateral bases respiratory effort increased  HEART: S1S2 A/R.  No carotid bruit. No murmur, rub or gallop.  ABD: Soft, nontender. +BS X 4 quads. No hepatomegaly.   EXT: Radial and pedal pulses 2+ and symmetric. Without varicosities. 2+ BLE edema.  MUSCSKEL: Good ROM X4 extremities. No deformity.   NEURO: A/confused Calm and cooperative.     Telemetry: SR    Medications:    sodium chloride flush  5-40 mL IntraVENous 2 times per day    [Held by provider] furosemide  40 mg IntraVENous BID    allopurinol  100 mg Oral Daily    atorvastatin  40 mg Oral Daily    mometasone-formoterol  2 puff Inhalation BID RT    cloNIDine  0.1 mg Oral TID    hydrALAZINE  50 mg Oral 3 times per day    isosorbide mononitrate  120 mg Oral Daily    magnesium oxide  400 mg Oral Lunch    therapeutic multivitamin-minerals  1 tablet Oral Daily    lactobacillus  1 capsule Oral BID    rivaroxaban  15 mg Oral Daily with breakfast    predniSONE  40 mg Oral Daily    ipratropium 0.5 mg-albuterol 2.5 mg  1 Dose Inhalation Q4H WA RT    cefTRIAXone (ROCEPHIN) IV  1,000 mg IntraVENous Q24H      sodium chloride       ipratropium 0.5 mg-albuterol 2.5 mg, benzocaine-menthol, melatonin, sodium chloride flush, sodium chloride,

## 2024-03-03 NOTE — PROGRESS NOTES
Patient ready for discharge. Belongings packed, IV removed.  at bedside. Prescriptions and discharge instructions reviewed. They verbalized understanding of all instructions. Patient awaiting to leave after eating lunch

## 2024-03-03 NOTE — RT PROTOCOL NOTE
RT Inhaler-Nebulizer Bronchodilator Protocol Note    There is a bronchodilator order in the chart from a provider indicating to follow the RT Bronchodilator Protocol and there is an “Initiate RT Inhaler-Nebulizer Bronchodilator Protocol” order as well (see protocol at bottom of note).    CXR Findings:  No results found.    The findings from the last RT Protocol Assessment were as follows:   History Pulmonary Disease: Chronic pulmonary disease  Respiratory Pattern: Dyspnea on exertion or RR 21-25 bpm  Breath Sounds: Slightly diminished and/or crackles  Cough: Strong, spontaneous, non-productive  Indication for Bronchodilator Therapy: Decreased or absent breath sounds, On home bronchodilators  Bronchodilator Assessment Score: 6    Aerosolized bronchodilator medication orders have been revised according to the RT Inhaler-Nebulizer Bronchodilator Protocol below.    Respiratory Therapist to perform RT Therapy Protocol Assessment initially then follow the protocol.  Repeat RT Therapy Protocol Assessment PRN for score 0-3 or on second treatment, BID, and PRN for scores above 3.    No Indications - adjust the frequency to every 6 hours PRN wheezing or bronchospasm, if no treatments needed after 48 hours then discontinue using Per Protocol order mode.     If indication present, adjust the RT bronchodilator orders based on the Bronchodilator Assessment Score as indicated below.  Use Inhaler orders unless patient has one or more of the following: on home nebulizer, not able to hold breath for 10 seconds, is not alert and oriented, cannot activate and use MDI correctly, or respiratory rate 25 breaths per minute or more, then use the equivalent nebulizer order(s) with same Frequency and PRN reasons based on the score.  If a patient is on this medication at home then do not decrease Frequency below that used at home.    0-3 - enter or revise RT bronchodilator order(s) to equivalent RT Bronchodilator order with Frequency of every  4 hours PRN for wheezing or increased work of breathing using Per Protocol order mode.        4-6 - enter or revise RT Bronchodilator order(s) to two equivalent RT bronchodilator orders with one order with BID Frequency and one order with Frequency of every 4 hours PRN wheezing or increased work of breathing using Per Protocol order mode.        7-10 - enter or revise RT Bronchodilator order(s) to two equivalent RT bronchodilator orders with one order with TID Frequency and one order with Frequency of every 4 hours PRN wheezing or increased work of breathing using Per Protocol order mode.       11-13 - enter or revise RT Bronchodilator order(s) to one equivalent RT bronchodilator order with QID Frequency and an Albuterol order with Frequency of every 4 hours PRN wheezing or increased work of breathing using Per Protocol order mode.      Greater than 13 - enter or revise RT Bronchodilator order(s) to one equivalent RT bronchodilator order with every 4 hours Frequency and an Albuterol order with Frequency of every 2 hours PRN wheezing or increased work of breathing using Per Protocol order mode.     RT to enter RT Home Evaluation for COPD & MDI Assessment order using Per Protocol order mode.    Electronically signed by Shelbie Talbot RCP on 3/2/2024 at 8:23 PM

## 2024-03-03 NOTE — DISCHARGE SUMMARY
MCG/ACT Aero  Commonly known as: SYMBICORT  Inhale 2 puffs into the lungs 2 times daily     cloNIDine 0.1 MG tablet  Commonly known as: CATAPRES  Take 1 tablet by mouth 3 times daily     dilTIAZem 120 MG extended release capsule  Commonly known as: CARDIZEM CD  Take 1 capsule by mouth daily     hydrALAZINE 50 MG tablet  Commonly known as: APRESOLINE  Take 1 tablet by mouth every 8 hours     hydrOXYzine pamoate 25 MG capsule  Commonly known as: Vistaril  Take 1 capsule by mouth daily as needed for Anxiety     isosorbide mononitrate 120 MG extended release tablet  Commonly known as: IMDUR  Take 1 tablet by mouth daily     magnesium oxide 400 MG tablet  Commonly known as: MAG-OX     MULTI FOR HER 50+ PO     saccharomyces boulardii 250 MG capsule  Commonly known as: FLORASTOR  Take 1 capsule by mouth 2 times daily     torsemide 20 MG tablet  Commonly known as: DEMADEX  Take 1 tablet by mouth in the morning, at noon, and at bedtime     Xarelto 15 MG Tabs tablet  Generic drug: rivaroxaban               Where to Get Your Medications        You can get these medications from any pharmacy    Bring a paper prescription for each of these medications  ciprofloxacin 500 MG tablet  predniSONE 20 MG tablet         35 Minutes spent on patient evaluation, counseling and discharge planning.     Signed:  Juan Grande MD  3/3/2024, 12:28 PM

## 2024-03-03 NOTE — PROGRESS NOTES
chloride flush, sodium chloride, ondansetron **OR** ondansetron, polyethylene glycol, acetaminophen **OR** acetaminophen, potassium chloride **OR** potassium alternative oral replacement **OR** potassium chloride, magnesium sulfate, hydrOXYzine pamoate, hydrALAZINE    Physical    VITALS:  BP (!) 148/83   Pulse (!) 106   Temp 98.7 °F (37.1 °C) (Oral)   Resp 24   Ht 1.626 m (5' 4\")   Wt 108 kg (238 lb 1.6 oz)   SpO2 100%   BMI 40.87 kg/m²   CONSTITUTIONAL:  WD/WN 79 y.o. year-old female who is sitting in a chair by the window awake, alert, cooperative, no apparent distress, and appears stated age  EYES:  Lids and lashes normal, PERRL, EOMI, sclera clear, conjunctiva normal  ENT:  NC/AT, MMM    NECK:  Supple, symmetrical, trachea midline, no adenopathy  HEMATOLOGIC/LYMPHATICS:  no cervical, supraclavicular or axillary lymphadenopathy  LUNGS:  clear to auscultation bilaterally, No increased work of breathing, good air exchange, no crackles or wheezing  CARDIOVASCULAR:  Regular rate and rhythm, normal S1 and S2, no S3 or S4, and no significant murmurs, rubs or gallops noted. Normal apical impulse.   ABDOMEN:  Normal active bowel sounds, soft, non-tender, non-distended, no masses palpated, no organomegally  EXTREMITIES.  Lower extremities wrapped in ACE bandage, otherwise extremities atraumatic, no cyanosis or edema and Homans sign is negative, no sign of DVT.   MENTAL STATUS: Awake, alert, oriented to name, place and time.    NEUROLOGIC:  Cranial nerves II-XII are grossly intact.    SKIN:  normal skin color, texture, turgor for age.    Data    CBC with Differential:    Lab Results   Component Value Date/Time    WBC 10.1 03/01/2024 05:02 AM    HGB 9.5 03/01/2024 05:02 AM    HCT 27.7 03/01/2024 05:02 AM     03/01/2024 05:02 AM    MCV 89.3 03/01/2024 05:02 AM    RDW 18.3 03/01/2024 05:02 AM    SEGSPCT 75.6 06/27/2012 08:37 AM    BANDSPCT 15 11/07/2023 08:52 AM    METASPCT 4 11/07/2023 08:52 AM    LYMPHOPCT 3.5  W4MRMRDL 99.0 09/02/2023 12:04 PM    FMH3IUN 40.2 09/02/2023 12:04 PM    BEART 12.9 09/02/2023 12:04 PM    PO2ART 113.0 09/02/2023 12:04 PM    QAQ1ZJI 42.3 09/02/2023 12:04 PM           Intake/Output Summary (Last 24 hours) at 3/2/2024 1922  Last data filed at 3/2/2024 1815  Gross per 24 hour   Intake 720 ml   Output 2850 ml   Net -2130 ml         Consults:  IP CONSULT TO HOSPITALIST  IP CONSULT TO HEART FAILURE NURSE/COORDINATOR  IP CONSULT TO DIETITIAN  IP CONSULT TO CARDIOLOGY  IP CONSULT TO PALLIATIVE CARE      Principal Problem:    Acute on chronic respiratory failure with hypoxia and hypercapnia (HCC)  Resolved Problems:    * No resolved hospital problems. *      ASSESSMENT AND PLAN:    #.  Acute on chronic respiratory failure with hypoxia and hypercapnia - respiratory function now at baseline  #.  COPD exacerbation  - POA with VBG-pH 7.443, pCO2 53.7, pO2<30, HCO3 37.  -Chest x-ray-no acute process, stable cardiomegaly.  -Influenza a and B, rapid COVID-negative.  -Patient received DuoNeb, IV methylprednisolone in ER  -Continue p.o. prednisone, DuoNeb     #.  Acute on chronic diastolic congestive heart failure  -Echo-2/14/2024-EF is 65-70%.  -As per  patient's torsemide was recently increased.  -proBNP 5098, bilateral lower extremity edema on admission  -Patient received IV Lasix in ER  -Continue IV Lasix twice daily  -Strict input/output, daily weight (-670 cc)  -Cardiology consult appreciated     #.  Detectable troponin  -Troponin 109, 120  -Patient complaining of shortness of breath, no chest pain.  -EKG with T wave inversions in inferior leads.  -Trend troponin, repeat EKG  -Consult cardiology     # Hypertensive urgency.  -Patient's blood pressure 145-207/ in ED  -Patient received clonidine 0.1 mg, Lasix 40 mg in ED  -Resume home medications-clonidine 0.1 mg 3 times daily, hydralazine 50 mg 3 times daily, Cardizem, IMN     #.  UTI  -Urine culture in process  -Continue ceftriaxone     #.

## 2024-03-03 NOTE — CASE COMMUNICATION
Case Management Discharge Note            Date / Time of Note: 3/3/2024 9:09 AM                  Patient Name: Janae Perez     YOB: 1945  Diagnosis: Goals of care, counseling/discussion [Z71.89]  Acute cystitis without hematuria [N30.00]  Acute on chronic respiratory failure with hypoxia and hypercapnia (HCC) [J96.21, J96.22]  Acute on chronic congestive heart failure, unspecified heart failure type (HCC) [I50.9]     Date / Time: 2/29/2024  3:59 PM    Financial:  Payor: MEDICARE / Plan: MEDICARE PART A AND B / Product Type: *No Product type* /      Pharmacy:    [facility] [id number] - [place], [place] - [address]  P [phone] - F [phone]  [address] [place] [address]  Phone: [phone] Fax: [phone]      Assistance purchasing medications?: Potential Assistance Purchasing Medications: No  Assistance provided by Case Management: None at this time    DISCHARGE Disposition: Home with Home Health Care    Home Care:  Home Care ordered at discharge: Yes  Home Care Agency: [facility]  Phone: [phone]  Fax: [phone]  Orders faxed: Yes    Transportation:  Transportation PLAN for discharge: family   Mode of Transport: Private Car  Time of Transport: TBD by patient, family and/or medical staff.     Transport form completed: Not Indicated    IMM Completed:   Yes, Case management has presented and reviewed IMM letter #2.   IMM Letter given to Patient/Family/Significant other/Guardian/POA/by:: reviewed with daughter via telephone and she declined a copy. SLR   IMM Letter date given:: 03/03/24  IMM Letter time given:: 0913.   Patient and/or family/POA verbalized understanding of their medicare rights and appeal process if needed. Patient and/or family/POA has signed, initialed and placed the date and time on IMM letter #2 on the the appropriate lines. Copy of letter offered and they are aware that the original copy of IMM letter #2 is available prior to

## 2024-03-04 ENCOUNTER — CARE COORDINATION (OUTPATIENT)
Dept: CASE MANAGEMENT | Age: 79
End: 2024-03-04

## 2024-03-04 NOTE — CARE COORDINATION
Care Transitions Initial Follow Up Call    Call within 2 business days of discharge: Yes    Patient Current Location:  [place]    Care Transition Nurse contacted the patient by telephone to perform post hospital discharge assessment. Verified name and  with patient as identifiers. Provided introduction to self, and explanation of the Care Transition Nurse role.     Patient: Janae Perez Patient : 1945   MRN: [id number]  Reason for Admission: SOB  Discharge Date: 3/3/24 RARS: Readmission Risk Score: 37      Last Discharge Facility       Date Complaint Diagnosis Description Type Department Provider    24 Shortness of Breath Acute cystitis without hematuria ... ED to Hosp-Admission (Discharged) (ADMITTED) Juan Medeiros MD; Ferny Liang...            Was this an external facility discharge? No Discharge Facility: [facility]    Challenges to be reviewed by the provider   Additional needs identified to be addressed with provider: No                 Method of communication with provider: none.    Initial attempt at CT discharge phone call.Janae states she is doing \"alright.' She states she has not heard from [facility] since she has returned home. She states she did weigh herself this morning and her weight = 227.0lbs. Janae states she did not check any other vital signs. Janae had difficulty focusing kept saying \"goodbye\" to writer. Linda did not wish to speak on the phone. Janae stated that writer could contact her daughter Anastacio at any time. Call was ended.    Call placed to Anastacio. Unable to reach Anatsacio. Left message. Contact info provided. Requested return call to [facility].    Call placed to [facility]. Spoke with Francisca from the intake team. She states they do have the resumption of care orders.               Discussed follow-up appointments. If no appointment was previously scheduled, appointment scheduling offered: Yes.   Is follow up appointment scheduled within 7 days

## 2024-03-04 NOTE — TELEPHONE ENCOUNTER
Her  came into the office this morning and stated that she was in the hospital and he didn't think he would be able to get her to the appointment.  He said they will reschedule when they can.

## 2024-03-04 NOTE — TELEPHONE ENCOUNTER
Kimberley-Registrar    Monday 3/11/2024 with Dr. Lopez at [place] end of the day preferred. Any questions, please ask me. Thanks.

## 2024-03-05 ENCOUNTER — CARE COORDINATION (OUTPATIENT)
Dept: CASE MANAGEMENT | Age: 79
End: 2024-03-05

## 2024-03-05 DIAGNOSIS — J96.21 ACUTE ON CHRONIC RESPIRATORY FAILURE WITH HYPOXIA AND HYPERCAPNIA (HCC): Primary | ICD-10-CM

## 2024-03-05 DIAGNOSIS — J96.22 ACUTE ON CHRONIC RESPIRATORY FAILURE WITH HYPOXIA AND HYPERCAPNIA (HCC): Primary | ICD-10-CM

## 2024-03-05 PROCEDURE — 1111F DSCHRG MED/CURRENT MED MERGE: CPT

## 2024-03-05 NOTE — CARE COORDINATION
Care Transitions Initial Follow Up Call    Call within 2 business days of discharge: Yes    Patient Current Location:  Home: [address] [place] [address]    Care Transition Nurse contacted the patient by telephone to perform post hospital discharge assessment. Verified name and  with family as identifiers. Provided introduction to self, and explanation of the Care Transition Nurse role.     Patient: Janae Perez Patient : 1945   MRN: [id number]  Reason for Admission: SOB  Discharge Date: 3/3/24 RARS: Readmission Risk Score: 37      Last Discharge Facility       Date Complaint Diagnosis Description Type Department Provider    24 Shortness of Breath Acute cystitis without hematuria ... ED to Hosp-Admission (Discharged) (ADMITTED) Juna Medeiros MD; Ferny Liang...            Was this an external facility discharge? No Discharge Facility: [facility]    Challenges to be reviewed by the provider   Additional needs identified to be addressed with provider: No                 Method of communication with provider: none.    2nd attempt at completing CT discharge phone call. Spoke with ashok - daughter - HIPAA verified ans to [place]. Ashok states her mother does not want to move or do things for herself. Janae wants to be waited on. They state they have not heard from [facility]..  Ashok states she will be teaching her father how to do the B/P and pulse oximetry monitoring. Ashok states they will be doing all the vitals daily. She states that currently the tablet from [facility] states it is paused. Writer had received the following staff message: Lary Mojica Brenda L, RN; Lesley Durant LPN  Caller: Unspecified (Yesterday,  9:53 AM)  Hello,  I'm covering for Yuliya today. I have resumed the patient so she can enter her vitals  Thanks. Writer will reach out to HRS & Support team to check on patient status. Ashok states that they have been unable to participate in the

## 2024-03-06 ENCOUNTER — TELEPHONE (OUTPATIENT)
Dept: CARDIOLOGY | Age: 79
End: 2024-03-06

## 2024-03-06 NOTE — TELEPHONE ENCOUNTER
Contacted patient and provided patient instructions. Her  is currently out of the home and I asked if she wanted a call back to give him instructions and she does. This RN will call later this afternoon. She vu and has no q/c at this time.

## 2024-03-06 NOTE — TELEPHONE ENCOUNTER
This RN called and spoke with Linda, patient's  to confirm torsemide dosing and new instructions. He vu and read back correctly. Also confirmed appt for Monday and contact office between now and then if they have any needs. He vu with no q/c at this time.

## 2024-03-06 NOTE — TELEPHONE ENCOUNTER
Maribel,   Looks like Janae checked a CM reading at home, PAD is above goal.   Please advise to increase torsemide - she is currently on 20mg TID. Please have her increase to 40mg TID X2 days then resume 20mg TID.

## 2024-03-08 VITALS
HEART RATE: 64 BPM | DIASTOLIC BLOOD PRESSURE: 78 MMHG | BODY MASS INDEX: 39.46 KG/M2 | OXYGEN SATURATION: 99 % | SYSTOLIC BLOOD PRESSURE: 152 MMHG | WEIGHT: 229.9 LBS

## 2024-03-08 RX ORDER — DILTIAZEM HYDROCHLORIDE 120 MG/1
CAPSULE, COATED, EXTENDED RELEASE ORAL
Qty: 180 CAPSULE | Refills: 0 | Status: SHIPPED | OUTPATIENT
Start: 2024-03-08

## 2024-03-11 ENCOUNTER — CARE COORDINATION (OUTPATIENT)
Dept: CASE MANAGEMENT | Age: 79
End: 2024-03-11

## 2024-03-11 ENCOUNTER — OFFICE VISIT (OUTPATIENT)
Dept: CARDIOLOGY CLINIC | Age: 79
End: 2024-03-11
Payer: MEDICARE

## 2024-03-11 ENCOUNTER — OFFICE VISIT (OUTPATIENT)
Dept: FAMILY MEDICINE CLINIC | Age: 79
End: 2024-03-11
Payer: MEDICARE

## 2024-03-11 ENCOUNTER — CARE COORDINATION (OUTPATIENT)
Dept: CARE COORDINATION | Age: 79
End: 2024-03-11

## 2024-03-11 VITALS
OXYGEN SATURATION: 97 % | BODY MASS INDEX: 38.69 KG/M2 | DIASTOLIC BLOOD PRESSURE: 80 MMHG | HEIGHT: 64 IN | HEART RATE: 70 BPM | SYSTOLIC BLOOD PRESSURE: 158 MMHG

## 2024-03-11 VITALS — TEMPERATURE: 97.5 F | DIASTOLIC BLOOD PRESSURE: 74 MMHG | SYSTOLIC BLOOD PRESSURE: 126 MMHG

## 2024-03-11 DIAGNOSIS — J96.10 CHRONIC RESPIRATORY FAILURE, UNSPECIFIED WHETHER WITH HYPOXIA OR HYPERCAPNIA (HCC): ICD-10-CM

## 2024-03-11 DIAGNOSIS — R06.09 CHRONIC DYSPNEA: ICD-10-CM

## 2024-03-11 DIAGNOSIS — D64.9 ANEMIA, UNSPECIFIED TYPE: ICD-10-CM

## 2024-03-11 DIAGNOSIS — J44.1 COPD EXACERBATION (HCC): ICD-10-CM

## 2024-03-11 DIAGNOSIS — I25.10 CAD IN NATIVE ARTERY: ICD-10-CM

## 2024-03-11 DIAGNOSIS — J96.12 CHRONIC RESPIRATORY FAILURE WITH HYPERCAPNIA (HCC): ICD-10-CM

## 2024-03-11 DIAGNOSIS — I48.0 PAF (PAROXYSMAL ATRIAL FIBRILLATION) (HCC): ICD-10-CM

## 2024-03-11 DIAGNOSIS — I50.33 ACUTE ON CHRONIC DIASTOLIC HEART FAILURE (HCC): Primary | ICD-10-CM

## 2024-03-11 DIAGNOSIS — I50.33 ACUTE ON CHRONIC DIASTOLIC (CONGESTIVE) HEART FAILURE (HCC): Primary | ICD-10-CM

## 2024-03-11 DIAGNOSIS — J44.9 COPD, SEVERE (HCC): ICD-10-CM

## 2024-03-11 DIAGNOSIS — D64.9 CHRONIC ANEMIA: ICD-10-CM

## 2024-03-11 PROCEDURE — 1090F PRES/ABSN URINE INCON ASSESS: CPT | Performed by: FAMILY MEDICINE

## 2024-03-11 PROCEDURE — G8417 CALC BMI ABV UP PARAM F/U: HCPCS | Performed by: FAMILY MEDICINE

## 2024-03-11 PROCEDURE — 3023F SPIROM DOC REV: CPT | Performed by: FAMILY MEDICINE

## 2024-03-11 PROCEDURE — 1111F DSCHRG MED/CURRENT MED MERGE: CPT | Performed by: INTERNAL MEDICINE

## 2024-03-11 PROCEDURE — 3023F SPIROM DOC REV: CPT | Performed by: INTERNAL MEDICINE

## 2024-03-11 PROCEDURE — 3077F SYST BP >= 140 MM HG: CPT | Performed by: INTERNAL MEDICINE

## 2024-03-11 PROCEDURE — 3078F DIAST BP <80 MM HG: CPT | Performed by: FAMILY MEDICINE

## 2024-03-11 PROCEDURE — 1111F DSCHRG MED/CURRENT MED MERGE: CPT | Performed by: FAMILY MEDICINE

## 2024-03-11 PROCEDURE — 1090F PRES/ABSN URINE INCON ASSESS: CPT | Performed by: INTERNAL MEDICINE

## 2024-03-11 PROCEDURE — G8427 DOCREV CUR MEDS BY ELIG CLIN: HCPCS | Performed by: FAMILY MEDICINE

## 2024-03-11 PROCEDURE — 3074F SYST BP LT 130 MM HG: CPT | Performed by: FAMILY MEDICINE

## 2024-03-11 PROCEDURE — 1036F TOBACCO NON-USER: CPT | Performed by: INTERNAL MEDICINE

## 2024-03-11 PROCEDURE — G8427 DOCREV CUR MEDS BY ELIG CLIN: HCPCS | Performed by: INTERNAL MEDICINE

## 2024-03-11 PROCEDURE — 99214 OFFICE O/P EST MOD 30 MIN: CPT | Performed by: FAMILY MEDICINE

## 2024-03-11 PROCEDURE — 99214 OFFICE O/P EST MOD 30 MIN: CPT | Performed by: INTERNAL MEDICINE

## 2024-03-11 PROCEDURE — G8484 FLU IMMUNIZE NO ADMIN: HCPCS | Performed by: FAMILY MEDICINE

## 2024-03-11 PROCEDURE — 1123F ACP DISCUSS/DSCN MKR DOCD: CPT | Performed by: INTERNAL MEDICINE

## 2024-03-11 PROCEDURE — 1123F ACP DISCUSS/DSCN MKR DOCD: CPT | Performed by: FAMILY MEDICINE

## 2024-03-11 PROCEDURE — G8417 CALC BMI ABV UP PARAM F/U: HCPCS | Performed by: INTERNAL MEDICINE

## 2024-03-11 PROCEDURE — G8399 PT W/DXA RESULTS DOCUMENT: HCPCS | Performed by: FAMILY MEDICINE

## 2024-03-11 PROCEDURE — G8399 PT W/DXA RESULTS DOCUMENT: HCPCS | Performed by: INTERNAL MEDICINE

## 2024-03-11 PROCEDURE — G8484 FLU IMMUNIZE NO ADMIN: HCPCS | Performed by: INTERNAL MEDICINE

## 2024-03-11 PROCEDURE — 3079F DIAST BP 80-89 MM HG: CPT | Performed by: INTERNAL MEDICINE

## 2024-03-11 PROCEDURE — 1036F TOBACCO NON-USER: CPT | Performed by: FAMILY MEDICINE

## 2024-03-11 RX ORDER — BLOOD-GLUCOSE METER
KIT MISCELLANEOUS AS NEEDED
COMMUNITY

## 2024-03-11 RX ORDER — METOLAZONE 2.5 MG/1
TABLET ORAL
Qty: 16 TABLET | Refills: 6 | Status: SHIPPED | OUTPATIENT
Start: 2024-03-11

## 2024-03-11 RX ORDER — ALBUTEROL SULFATE 2.5 MG/3ML
2.5 SOLUTION RESPIRATORY (INHALATION) EVERY 4 HOURS PRN
Qty: 360 ML | Refills: 11
Start: 2024-03-11

## 2024-03-11 RX ORDER — BUDESONIDE AND FORMOTEROL FUMARATE DIHYDRATE 80; 4.5 UG/1; UG/1
2 AEROSOL RESPIRATORY (INHALATION) 2 TIMES DAILY
Qty: 10.2 G | Refills: 3 | Status: SHIPPED | OUTPATIENT
Start: 2024-03-11

## 2024-03-11 NOTE — CARE COORDINATION
Care Transitions Follow Up Call    Patient Current Location:  Home: [address] [place], [place] [id number]    [facility] Care Coordinator contacted the patient by telephone to follow up after admission on .  Verified name and  with patient as identifiers.    Patient: Janae Perez  Patient : 1945   MRN: [id number]  Reason for Admission:  SOB   Discharge Date: 3/3/24 RARS: Readmission Risk Score: 37      Needs to be reviewed by the provider   Additional needs identified to be addressed with provider: No  none             Method of communication with provider: none.    Spoke with Janae Perez who reported that she is doing better. Patient stated that she continues with sob. Patient then stated that she was had to get off the phone to go to an appointment and call was ended.     Addressed changes since last contact:  none  Discussed follow-up appointments. If no appointment was previously scheduled, appointment scheduling offered: Yes.   Is follow up appointment scheduled within 7 days of discharge? Yes.    Follow Up  Future Appointments   Date Time Provider Department Center   3/11/2024  2:45 PM Dejuan Lopez MD [facility]   3/11/2024  3:40 PM Boubacar Dhillon MD [place]  [place] [facility]     External follow up appointment(s): na    [facility] Care Coordinator reviewed medical action plan with patient and discussed any barriers to care and/or understanding of plan of care after discharge. Discussed appropriate site of care based on symptoms and resources available to patient including: PCP  Specialist  Urgent care clinics  [facility]  When to call 911  [facility]. The patient agrees to contact the PCP office for questions related to their healthcare.     Advance Care Planning   The patient has the following advanced directives on file:  Advance Directives       Power of  Living Will ACP-Advance Directive ACP-Power of     Not on File Filed on 20 Filed Not on File            The patient

## 2024-03-11 NOTE — CARE COORDINATION
Remote Alert Monitoring Note  Rpm alert to be reviewed by the provider   red alert   blood pressure reading (199/95) and pulse ox reading (91%)   Additional needs to be addressed by Cardiologist: ENEDELIA Pt was not willing to recheck her BP. Her o2 went up to 96%. She reports she has taken all of her medication today at 7am. I have attached a list of readings for review for her appt                     Date/Time:  3/11/2024 9:52 AM  Patient Current Location: [facility] contacted patient by telephone. Verified patients name and  as identifiers.  Background: Pt enrolled CHF and COPD    Refer to 911 immediately if:  Patient unresponsive or unable to provide history  Change in cognition or sudden confusion  Patient unable to respond in complete sentences  Intense chest pain/tightness  Any concern for any clinical emergency  Red Alert: Provider response time of 1 hr required for any red alert requiring intervention  Yellow Alert: Provider response time of 3hr required for any escalated yellow alert    BP Triage  Are you having any Chest Pain? no   Are you having any Shortness of Breath? no   Do you have a headache or have any vision changes? no   Are you having any numbness or tingling? no   Are you having any other health concerns or issues? no        Clinical Interventions:  Spoke with pt, she reports she has taken her blood pressure medication at 7am - cardizem 120mg, hydralazine 50mg and clonidine 0.1mg. she is not comfortable retaking her BP (pt family member did it for her) and her  is also not comfortable rechecking. Her oxygen was rechecked and it went to 96%, but she does report she is SOB today. Will send readings to cardio since she has an appt today, pt encouraged to keep appt today, she states she plans to attend both cardio appt and PCP appt. She denies CP/dizziness/vision changes. Discussed red flag sx and when to visit ER, she VU.    Plan/Follow Up: Will continue to review, monitor and address alerts

## 2024-03-11 NOTE — PROGRESS NOTES
3/21/18 by Dr. Jamaal Harrison.    -Follows vascular surgery      Chronic anemia/GI bleed  Recently admitted for GI bleed and required 3 units of blood  -She was diagnosed with grade B esophagitis as well as AVM  6/16/23: hospitalization for GI bleed. She denies any recurrent bleeding issues. Continue Xarelto 15 mg daily. No longer taking Plavix. 3/1/224 9.5/27.7.     We will follow up in 2 weeks.   Complexity of medical decision making-high    Thank you very much for allowing me to participate in the care of your patient. Please do not hesitate to contact me if you have any questions.        Sincerely,  Dejuan Lopez MD      [facility]  [address], [address]   [place] [address]  Ph: [phone]  Fax: [phone]    This note was scribed in the presence of Dr. ARTEMIO Lopez MD by Maribel Stanley RN.  Physician Attestation:  The scribes documentation has been prepared under my direction and personally reviewed by me in its entirety.     I confirm that the note above accurately reflects all work, treatment, procedures, and medical decision making performed by me.

## 2024-03-11 NOTE — PATIENT INSTRUCTIONS
I am going to [facility] palliative care consulted  Do see dr killian for a check up

## 2024-03-11 NOTE — PATIENT INSTRUCTIONS
- Start Metolazone 2.5 mg, 30 minutes prior to morning torsemide dose on MWF.   -Labs with BMP, BNP end of next week   -Follow up in 2 weeks with Dr. Lopez.   -Elevate legs, leg exercises daily, deep breathing daily, low sodium diet < 2000 mg/day, fluid restriction 1500 ml/day, knee high compression stockings daily.

## 2024-03-11 NOTE — PROGRESS NOTES
Subjective:      Patient ID: Janae Perez is a 79 y.o. female.    Chief Complaint   Patient presents with    Follow-up     Discuss palliative care  Discuss Vitamin C         Patient presents with:  Follow-up: Discuss palliative care  Discuss Vitamin C     Here with her daughter and    To f/u the hospital visit  For chronic resp failure/cardiac failure     Just had visit with cardiology     Some concerns about palliative care    Meds noted and zaroxyln added  She gets anxious at home about using the O2 and concerned if she is too active she will not  be able to breath    YOB: 1945    Date of Visit:  3/11/2024     -- Morphine -- Rash    --  rash    Current Outpatient Medications:  Nebulizer System All-In-One MISC, by Does not apply route as needed, Disp: , Rfl:   metOLazone (ZAROXOLYN) 2.5 MG tablet, 30 minutes prior to morning torsemide dose on M,W,F, Disp: 16 tablet, Rfl: 6  albuterol (PROVENTIL) (2.5 MG/3ML) 0.083% nebulizer solution, Take 3 mLs by nebulization every 4 hours as needed for Wheezing, Disp: 360 mL, Rfl: 11  budesonide-formoterol (SYMBICORT) 80-4.5 MCG/ACT AERO, Inhale 2 puffs into the lungs 2 times daily, Disp: 10.2 g, Rfl: 3  dilTIAZem (CARDIZEM CD) 120 MG extended release capsule, TAKE ONE CAPSULE BY MOUTH TWICE A DAY ONCE IN THE MORNING AND THEN AGAIN AT NIGHT AT BEDTIME, Disp: 180 capsule, Rfl: 0  torsemide (DEMADEX) 20 MG tablet, Take 1 tablet by mouth in the morning, at noon, and at bedtime, Disp: 90 tablet, Rfl: 3  hydrOXYzine pamoate (VISTARIL) 25 MG capsule, Take 1 capsule by mouth daily as needed for Anxiety, Disp: 30 capsule, Rfl: 0  hydrALAZINE (APRESOLINE) 50 MG tablet, Take 1 tablet by mouth every 8 hours, Disp: 90 tablet, Rfl: 0  cloNIDine (CATAPRES) 0.1 MG tablet, Take 1 tablet by mouth 3 times daily, Disp: 60 tablet, Rfl: 0  allopurinol (ZYLOPRIM) 100 MG tablet, Take 1 tablet by mouth daily, Disp: 30 tablet, Rfl: 0  atorvastatin (LIPITOR) 40 MG tablet, Take 1

## 2024-03-12 ENCOUNTER — TELEPHONE (OUTPATIENT)
Dept: FAMILY MEDICINE CLINIC | Age: 79
End: 2024-03-12
Payer: MEDICARE

## 2024-03-12 DIAGNOSIS — I13.0 HYPERTENSIVE HEART AND RENAL DISEASE WITH CONGESTIVE HEART FAILURE (HCC): Primary | ICD-10-CM

## 2024-03-12 PROCEDURE — G0179 MD RECERTIFICATION HHA PT: HCPCS | Performed by: FAMILY MEDICINE

## 2024-03-15 ENCOUNTER — CARE COORDINATION (OUTPATIENT)
Dept: CARE COORDINATION | Age: 79
End: 2024-03-15

## 2024-03-15 NOTE — CARE COORDINATION
reviewed and current.     Patients top risk factors for readmission: medical condition-COPD  Interventions to address risk factors: Assessment and support for treatment adherence and medication management-denied new or changed meds    Offered patient enrollment in the Remote Patient Monitoring (RPM) program for in-home monitoring: Yes, patient already enrolled.     Care Transitions Subsequent and Final Call    Subsequent and Final Calls  Do you have any ongoing symptoms?: No  Have your medications changed?: No  Do you have any questions related to your medications?: No  Do you currently have any active services?: No  Are you currently active with any services?: Home Health  Do you have any needs or concerns that I can assist you with?: No  Identified Barriers: None  Care Transitions Interventions  Other Interventions:             LPN Care Coordinator provided contact information for future needs. Plan for follow-up call in 3-5 days based on severity of symptoms and risk factors.  Plan for next call: symptom management-SOB, cough, CP, s/s infection  self management-vitals, ADLs  follow-up appointment-pulm 3/28   medication management-new or changed meds?  community resources-HHC, Palliative Care?  RPM review    Rere Murdock LPN

## 2024-03-18 ENCOUNTER — TELEPHONE (OUTPATIENT)
Dept: CARDIOLOGY CLINIC | Age: 79
End: 2024-03-18

## 2024-03-18 NOTE — TELEPHONE ENCOUNTER
This RN checking cardiomems HF PAD readings today.   Office visit on  3/11/24 with Dr. Lopez where he added metolazone 2.5 mg MWF, labs this week of 3/18 then f/u 3/25. will await labs per Dr. Lopez reviewing last 1 week PAD readings.  Updated Merlin.

## 2024-03-19 NOTE — PROGRESS NOTES
Physician Progress Note      PATIENT:               KELLEN LUDWIG  CSN #:                  [id number]  :                       1945  ADMIT DATE:       2024 3:59 PM  DISCH DATE:        3/3/2024 8:15 AM  RESPONDING  PROVIDER #:        Kimberley Hodges NP          QUERY TEXT:    Patient admitted with SOB and elevated troponins. If possible, please document   in the progress notes and discharge summary if you are evaluating and/or   treating any of the following:      The medical record reflects the following:  Risk Factors: Age, Hx- AAA, AF, CAD, CHF, COPD, HTN, HLD  Clinical Indicators: Trop 109, 120, 83, 74.............Per Cardiology pn on   3/2/24- NSTEMI:  Elevated troponin 109, 120, 83,74- chronically elevated   80's-100s, bump likely supply/demand mismatch in setting of acute hypoxic resp   failure   EKG without ischemic changes   Hx CAD w  RCA, LHC 2022 with   DESX2 to LCX.   Cont lipitor, xarelto  Treatment: Ser Troponins, Cardiology consult, Lipitor, Xarelto    Thank You,  Sarah Herrera RN BSN CDS CRCR  [email or web address]  Options provided:  -- NSTEMI  -- NSTEMI ruled out  -- Other - I will add my own diagnosis  -- Disagree - Not applicable / Not valid  -- Disagree - Clinically unable to determine / Unknown  -- Refer to Clinical Documentation Reviewer    PROVIDER RESPONSE TEXT:    This patient has an NSTEMI.    Query created by: Sarah Herrera on 3/7/2024 6:42 AM      Electronically signed by:  Kimberley Hodges NP 3/19/2024 8:25 AM

## 2024-03-20 ENCOUNTER — CARE COORDINATION (OUTPATIENT)
Dept: CASE MANAGEMENT | Age: 79
End: 2024-03-20

## 2024-03-20 DIAGNOSIS — R06.09 CHRONIC DYSPNEA: ICD-10-CM

## 2024-03-20 DIAGNOSIS — I25.10 CAD IN NATIVE ARTERY: ICD-10-CM

## 2024-03-20 DIAGNOSIS — I50.33 ACUTE ON CHRONIC DIASTOLIC HEART FAILURE (HCC): ICD-10-CM

## 2024-03-20 DIAGNOSIS — D64.9 CHRONIC ANEMIA: ICD-10-CM

## 2024-03-20 DIAGNOSIS — J44.9 COPD, SEVERE (HCC): ICD-10-CM

## 2024-03-20 DIAGNOSIS — J96.10 CHRONIC RESPIRATORY FAILURE, UNSPECIFIED WHETHER WITH HYPOXIA OR HYPERCAPNIA (HCC): ICD-10-CM

## 2024-03-20 DIAGNOSIS — D64.9 ANEMIA, UNSPECIFIED TYPE: ICD-10-CM

## 2024-03-20 LAB
ANION GAP SERPL CALCULATED.3IONS-SCNC: 16 MMOL/L (ref 3–16)
BUN SERPL-MCNC: 68 MG/DL (ref 7–20)
CALCIUM SERPL-MCNC: 10.3 MG/DL (ref 8.3–10.6)
CHLORIDE SERPL-SCNC: 86 MMOL/L (ref 99–110)
CO2 SERPL-SCNC: 36 MMOL/L (ref 21–32)
CREAT SERPL-MCNC: 2.3 MG/DL (ref 0.6–1.2)
DEPRECATED RDW RBC AUTO: 18.5 % (ref 12.4–15.4)
GFR SERPLBLD CREATININE-BSD FMLA CKD-EPI: 21 ML/MIN/{1.73_M2}
GLUCOSE SERPL-MCNC: 106 MG/DL (ref 70–99)
HCT VFR BLD AUTO: 31.8 % (ref 36–48)
HGB BLD-MCNC: 10.6 G/DL (ref 12–16)
MCH RBC QN AUTO: 29.5 PG (ref 26–34)
MCHC RBC AUTO-ENTMCNC: 33.4 G/DL (ref 31–36)
MCV RBC AUTO: 88.3 FL (ref 80–100)
NT-PROBNP SERPL-MCNC: 2804 PG/ML (ref 0–449)
PLATELET # BLD AUTO: 210 K/UL (ref 135–450)
PMV BLD AUTO: 9.9 FL (ref 5–10.5)
POTASSIUM SERPL-SCNC: 2.5 MMOL/L (ref 3.5–5.1)
RBC # BLD AUTO: 3.6 M/UL (ref 4–5.2)
SODIUM SERPL-SCNC: 138 MMOL/L (ref 136–145)
WBC # BLD AUTO: 11 K/UL (ref 4–11)

## 2024-03-20 NOTE — CARE COORDINATION
Care Transitions Follow Up Call    Patient Current Location:  Home: [address] [place], [place] [id number]    Care Transition Nurse contacted the patient by telephone to follow up after admission on 2024.  Verified name and  with patient as identifiers.    Patient: Janae Perez  Patient : 1945   MRN: [id number]  Reason for Admission: SOB  Discharge Date: 3/3/24 RARS: Readmission Risk Score: 37      Needs to be reviewed by the provider   Additional needs identified to be addressed with provider: No             Method of communication with provider: none.    Janae states she is \"alright.\" She states [facility] is active. She feels RPM is going well. Janae states she is using her Cardiomems pillow. She states she is using her oxygen continuously at 3lpm. She feels  she is more SOB than her normal. Janae states she is up and walking daily in her home.     Call placed to Anastacio. She states her mother walks about 10 steps to the scale daily. She states her mother walks to the bathroom 2-3 times daily. She states she eats at the table about 1 time per week. Anastacio states palliative care will be involved in her care in the future.      Follow Up  Future Appointments   Date Time Provider Department Center   3/25/2024  2:15 PM Dejuan Lopez MD [facility]   3/28/2024 11:20 AM Leland Blake DO [facility]          Care Transitions Subsequent and Final Call    Subsequent and Final Calls  Do you have any ongoing symptoms?: No  Have your medications changed?: No  Do you have any questions related to your medications?: No  Do you currently have any active services?: Yes  Are you currently active with any services?: Home Health  Do you have any needs or concerns that I can assist you with?: No  Identified Barriers: Lack of Motivation  Care Transitions Interventions  Other Interventions:             Care Transition Nurse provided contact information for future needs.   Plan for next

## 2024-03-21 ENCOUNTER — TELEPHONE (OUTPATIENT)
Dept: EMERGENCY DEPT | Age: 79
End: 2024-03-21

## 2024-03-21 ENCOUNTER — APPOINTMENT (OUTPATIENT)
Dept: CT IMAGING | Age: 79
DRG: 682 | End: 2024-03-21
Payer: MEDICARE

## 2024-03-21 ENCOUNTER — HOSPITAL ENCOUNTER (INPATIENT)
Age: 79
LOS: 5 days | Discharge: HOSPICE | DRG: 682 | End: 2024-03-26
Attending: EMERGENCY MEDICINE | Admitting: HOSPITALIST
Payer: MEDICARE

## 2024-03-21 ENCOUNTER — APPOINTMENT (OUTPATIENT)
Dept: GENERAL RADIOLOGY | Age: 79
DRG: 682 | End: 2024-03-21
Payer: MEDICARE

## 2024-03-21 DIAGNOSIS — E87.6 HYPOKALEMIA: Primary | ICD-10-CM

## 2024-03-21 DIAGNOSIS — N17.9 AKI (ACUTE KIDNEY INJURY) (HCC): ICD-10-CM

## 2024-03-21 LAB
ALBUMIN SERPL-MCNC: 3.5 G/DL (ref 3.4–5)
ANION GAP SERPL CALCULATED.3IONS-SCNC: 12 MMOL/L (ref 3–16)
ANION GAP SERPL CALCULATED.3IONS-SCNC: 13 MMOL/L (ref 3–16)
BACTERIA URNS QL MICRO: ABNORMAL /HPF
BASOPHILS # BLD: 0.1 K/UL (ref 0–0.2)
BASOPHILS NFR BLD: 0.7 %
BILIRUB UR QL STRIP.AUTO: NEGATIVE
BUN SERPL-MCNC: 61 MG/DL (ref 7–20)
BUN SERPL-MCNC: 69 MG/DL (ref 7–20)
CALCIUM SERPL-MCNC: 10.2 MG/DL (ref 8.3–10.6)
CALCIUM SERPL-MCNC: 9.5 MG/DL (ref 8.3–10.6)
CHLORIDE SERPL-SCNC: 88 MMOL/L (ref 99–110)
CHLORIDE SERPL-SCNC: 92 MMOL/L (ref 99–110)
CHLORIDE UR-SCNC: 43 MMOL/L
CLARITY UR: CLEAR
CO2 SERPL-SCNC: 31 MMOL/L (ref 21–32)
CO2 SERPL-SCNC: 37 MMOL/L (ref 21–32)
COLOR UR: YELLOW
CREAT SERPL-MCNC: 1.9 MG/DL (ref 0.6–1.2)
CREAT SERPL-MCNC: 2.4 MG/DL (ref 0.6–1.2)
DEPRECATED RDW RBC AUTO: 18.7 % (ref 12.4–15.4)
EKG ATRIAL RATE: 312 BPM
EKG DIAGNOSIS: NORMAL
EKG Q-T INTERVAL: 398 MS
EKG QRS DURATION: 82 MS
EKG QTC CALCULATION (BAZETT): 420 MS
EKG R AXIS: -23 DEGREES
EKG T AXIS: -63 DEGREES
EKG VENTRICULAR RATE: 67 BPM
EOSINOPHIL # BLD: 0.3 K/UL (ref 0–0.6)
EOSINOPHIL NFR BLD: 2.5 %
EPI CELLS #/AREA URNS AUTO: 1 /HPF (ref 0–5)
FLUAV RNA UPPER RESP QL NAA+PROBE: NEGATIVE
FLUBV AG NPH QL: NEGATIVE
GFR SERPLBLD CREATININE-BSD FMLA CKD-EPI: 20 ML/MIN/{1.73_M2}
GFR SERPLBLD CREATININE-BSD FMLA CKD-EPI: 27 ML/MIN/{1.73_M2}
GLUCOSE SERPL-MCNC: 146 MG/DL (ref 70–99)
GLUCOSE SERPL-MCNC: 160 MG/DL (ref 70–99)
GLUCOSE UR STRIP.AUTO-MCNC: NEGATIVE MG/DL
HCT VFR BLD AUTO: 30.6 % (ref 36–48)
HGB BLD-MCNC: 10.3 G/DL (ref 12–16)
HGB UR QL STRIP.AUTO: NEGATIVE
HYALINE CASTS #/AREA URNS AUTO: 1 /LPF (ref 0–8)
KETONES UR STRIP.AUTO-MCNC: NEGATIVE MG/DL
LEUKOCYTE ESTERASE UR QL STRIP.AUTO: ABNORMAL
LYMPHOCYTES # BLD: 0.9 K/UL (ref 1–5.1)
LYMPHOCYTES NFR BLD: 7 %
MAGNESIUM SERPL-MCNC: 2.8 MG/DL (ref 1.8–2.4)
MCH RBC QN AUTO: 29.5 PG (ref 26–34)
MCHC RBC AUTO-ENTMCNC: 33.6 G/DL (ref 31–36)
MCV RBC AUTO: 87.9 FL (ref 80–100)
MONOCYTES # BLD: 0.7 K/UL (ref 0–1.3)
MONOCYTES NFR BLD: 6 %
NEUTROPHILS # BLD: 10.2 K/UL (ref 1.7–7.7)
NEUTROPHILS NFR BLD: 83.8 %
NITRITE UR QL STRIP.AUTO: NEGATIVE
NT-PROBNP SERPL-MCNC: 2795 PG/ML (ref 0–449)
PH UR STRIP.AUTO: 7 [PH] (ref 5–8)
PHOSPHATE SERPL-MCNC: 2.8 MG/DL (ref 2.5–4.9)
PLATELET # BLD AUTO: 197 K/UL (ref 135–450)
PMV BLD AUTO: 9.4 FL (ref 5–10.5)
POTASSIUM SERPL-SCNC: 2.3 MMOL/L (ref 3.5–5.1)
POTASSIUM SERPL-SCNC: 2.4 MMOL/L (ref 3.5–5.1)
POTASSIUM SERPL-SCNC: 3.1 MMOL/L (ref 3.5–5.1)
POTASSIUM SERPL-SCNC: 3.5 MMOL/L (ref 3.5–5.1)
POTASSIUM SERPL-SCNC: 3.6 MMOL/L (ref 3.5–5.1)
POTASSIUM UR-SCNC: 34.9 MMOL/L
PROT UR STRIP.AUTO-MCNC: NEGATIVE MG/DL
RBC # BLD AUTO: 3.48 M/UL (ref 4–5.2)
RBC CLUMPS #/AREA URNS AUTO: 1 /HPF (ref 0–4)
SARS-COV-2 RDRP RESP QL NAA+PROBE: NOT DETECTED
SODIUM SERPL-SCNC: 135 MMOL/L (ref 136–145)
SODIUM SERPL-SCNC: 138 MMOL/L (ref 136–145)
SODIUM UR-SCNC: 43 MMOL/L
SP GR UR STRIP.AUTO: 1.01 (ref 1–1.03)
TROPONIN, HIGH SENSITIVITY: 107 NG/L (ref 0–14)
UA COMPLETE W REFLEX CULTURE PNL UR: ABNORMAL
UA DIPSTICK W REFLEX MICRO PNL UR: YES
URN SPEC COLLECT METH UR: ABNORMAL
UROBILINOGEN UR STRIP-ACNC: 0.2 E.U./DL
UUN UR-MCNC: 382.8 MG/DL (ref 800–1666)
WBC # BLD AUTO: 12.2 K/UL (ref 4–11)
WBC #/AREA URNS AUTO: 8 /HPF (ref 0–5)

## 2024-03-21 PROCEDURE — 93005 ELECTROCARDIOGRAM TRACING: CPT | Performed by: INTERNAL MEDICINE

## 2024-03-21 PROCEDURE — 6370000000 HC RX 637 (ALT 250 FOR IP): Performed by: HOSPITALIST

## 2024-03-21 PROCEDURE — 36415 COLL VENOUS BLD VENIPUNCTURE: CPT

## 2024-03-21 PROCEDURE — 2580000003 HC RX 258: Performed by: EMERGENCY MEDICINE

## 2024-03-21 PROCEDURE — 6360000002 HC RX W HCPCS: Performed by: HOSPITALIST

## 2024-03-21 PROCEDURE — 6370000000 HC RX 637 (ALT 250 FOR IP): Performed by: REGISTERED NURSE

## 2024-03-21 PROCEDURE — 94640 AIRWAY INHALATION TREATMENT: CPT

## 2024-03-21 PROCEDURE — 82436 ASSAY OF URINE CHLORIDE: CPT

## 2024-03-21 PROCEDURE — 87635 SARS-COV-2 COVID-19 AMP PRB: CPT

## 2024-03-21 PROCEDURE — 85025 COMPLETE CBC W/AUTO DIFF WBC: CPT

## 2024-03-21 PROCEDURE — 2580000003 HC RX 258: Performed by: HOSPITALIST

## 2024-03-21 PROCEDURE — 84484 ASSAY OF TROPONIN QUANT: CPT

## 2024-03-21 PROCEDURE — 71045 X-RAY EXAM CHEST 1 VIEW: CPT

## 2024-03-21 PROCEDURE — 6360000002 HC RX W HCPCS: Performed by: INTERNAL MEDICINE

## 2024-03-21 PROCEDURE — 6370000000 HC RX 637 (ALT 250 FOR IP): Performed by: INTERNAL MEDICINE

## 2024-03-21 PROCEDURE — 81001 URINALYSIS AUTO W/SCOPE: CPT

## 2024-03-21 PROCEDURE — 83880 ASSAY OF NATRIURETIC PEPTIDE: CPT

## 2024-03-21 PROCEDURE — 6360000002 HC RX W HCPCS

## 2024-03-21 PROCEDURE — 93010 ELECTROCARDIOGRAM REPORT: CPT | Performed by: INTERNAL MEDICINE

## 2024-03-21 PROCEDURE — 84300 ASSAY OF URINE SODIUM: CPT

## 2024-03-21 PROCEDURE — 96365 THER/PROPH/DIAG IV INF INIT: CPT

## 2024-03-21 PROCEDURE — 84132 ASSAY OF SERUM POTASSIUM: CPT

## 2024-03-21 PROCEDURE — 82570 ASSAY OF URINE CREATININE: CPT

## 2024-03-21 PROCEDURE — 83735 ASSAY OF MAGNESIUM: CPT

## 2024-03-21 PROCEDURE — 80069 RENAL FUNCTION PANEL: CPT

## 2024-03-21 PROCEDURE — 2700000000 HC OXYGEN THERAPY PER DAY

## 2024-03-21 PROCEDURE — 6360000002 HC RX W HCPCS: Performed by: EMERGENCY MEDICINE

## 2024-03-21 PROCEDURE — 6360000002 HC RX W HCPCS: Performed by: REGISTERED NURSE

## 2024-03-21 PROCEDURE — 6370000000 HC RX 637 (ALT 250 FOR IP): Performed by: EMERGENCY MEDICINE

## 2024-03-21 PROCEDURE — 87804 INFLUENZA ASSAY W/OPTIC: CPT

## 2024-03-21 PROCEDURE — 84133 ASSAY OF URINE POTASSIUM: CPT

## 2024-03-21 PROCEDURE — 84540 ASSAY OF URINE/UREA-N: CPT

## 2024-03-21 PROCEDURE — 99285 EMERGENCY DEPT VISIT HI MDM: CPT

## 2024-03-21 PROCEDURE — 94761 N-INVAS EAR/PLS OXIMETRY MLT: CPT

## 2024-03-21 PROCEDURE — 71250 CT THORAX DX C-: CPT

## 2024-03-21 PROCEDURE — 99222 1ST HOSP IP/OBS MODERATE 55: CPT | Performed by: NURSE PRACTITIONER

## 2024-03-21 PROCEDURE — 2000000000 HC ICU R&B

## 2024-03-21 PROCEDURE — 96366 THER/PROPH/DIAG IV INF ADDON: CPT

## 2024-03-21 PROCEDURE — 6370000000 HC RX 637 (ALT 250 FOR IP): Performed by: NURSE PRACTITIONER

## 2024-03-21 RX ORDER — POLYETHYLENE GLYCOL 3350 17 G/17G
17 POWDER, FOR SOLUTION ORAL DAILY PRN
Status: DISCONTINUED | OUTPATIENT
Start: 2024-03-21 | End: 2024-03-26 | Stop reason: HOSPADM

## 2024-03-21 RX ORDER — HYDRALAZINE HYDROCHLORIDE 20 MG/ML
5 INJECTION INTRAMUSCULAR; INTRAVENOUS EVERY 4 HOURS PRN
Status: DISCONTINUED | OUTPATIENT
Start: 2024-03-21 | End: 2024-03-26 | Stop reason: HOSPADM

## 2024-03-21 RX ORDER — POTASSIUM CHLORIDE 7.45 MG/ML
10 INJECTION INTRAVENOUS
Status: DISCONTINUED | OUTPATIENT
Start: 2024-03-21 | End: 2024-03-21

## 2024-03-21 RX ORDER — DILTIAZEM HYDROCHLORIDE 120 MG/1
120 CAPSULE, COATED, EXTENDED RELEASE ORAL 2 TIMES DAILY
Status: DISCONTINUED | OUTPATIENT
Start: 2024-03-21 | End: 2024-03-26 | Stop reason: HOSPADM

## 2024-03-21 RX ORDER — 0.9 % SODIUM CHLORIDE 0.9 %
1000 INTRAVENOUS SOLUTION INTRAVENOUS ONCE
Status: COMPLETED | OUTPATIENT
Start: 2024-03-21 | End: 2024-03-21

## 2024-03-21 RX ORDER — ONDANSETRON 4 MG/1
4 TABLET, ORALLY DISINTEGRATING ORAL EVERY 8 HOURS PRN
Status: DISCONTINUED | OUTPATIENT
Start: 2024-03-21 | End: 2024-03-26 | Stop reason: HOSPADM

## 2024-03-21 RX ORDER — SODIUM CHLORIDE AND POTASSIUM CHLORIDE 300; 900 MG/100ML; MG/100ML
INJECTION, SOLUTION INTRAVENOUS CONTINUOUS
Status: DISCONTINUED | OUTPATIENT
Start: 2024-03-21 | End: 2024-03-21

## 2024-03-21 RX ORDER — POTASSIUM CHLORIDE 20 MEQ/1
40 TABLET, EXTENDED RELEASE ORAL 2 TIMES DAILY
Status: DISCONTINUED | OUTPATIENT
Start: 2024-03-21 | End: 2024-03-23

## 2024-03-21 RX ORDER — IPRATROPIUM BROMIDE AND ALBUTEROL SULFATE 2.5; .5 MG/3ML; MG/3ML
1 SOLUTION RESPIRATORY (INHALATION) ONCE
Status: COMPLETED | OUTPATIENT
Start: 2024-03-21 | End: 2024-03-21

## 2024-03-21 RX ORDER — BUDESONIDE AND FORMOTEROL FUMARATE DIHYDRATE 80; 4.5 UG/1; UG/1
2 AEROSOL RESPIRATORY (INHALATION)
Status: DISCONTINUED | OUTPATIENT
Start: 2024-03-21 | End: 2024-03-21 | Stop reason: CLARIF

## 2024-03-21 RX ORDER — ENOXAPARIN SODIUM 100 MG/ML
30 INJECTION SUBCUTANEOUS DAILY
Status: DISCONTINUED | OUTPATIENT
Start: 2024-03-21 | End: 2024-03-26 | Stop reason: HOSPADM

## 2024-03-21 RX ORDER — POTASSIUM CHLORIDE 20 MEQ/1
20 TABLET, EXTENDED RELEASE ORAL ONCE
Status: DISCONTINUED | OUTPATIENT
Start: 2024-03-22 | End: 2024-03-21

## 2024-03-21 RX ORDER — POTASSIUM CHLORIDE 7.45 MG/ML
10 INJECTION INTRAVENOUS
Status: DISPENSED | OUTPATIENT
Start: 2024-03-21 | End: 2024-03-21

## 2024-03-21 RX ORDER — ONDANSETRON 2 MG/ML
4 INJECTION INTRAMUSCULAR; INTRAVENOUS EVERY 6 HOURS PRN
Status: DISCONTINUED | OUTPATIENT
Start: 2024-03-21 | End: 2024-03-26 | Stop reason: HOSPADM

## 2024-03-21 RX ORDER — POTASSIUM CHLORIDE 7.45 MG/ML
10 INJECTION INTRAVENOUS
Status: COMPLETED | OUTPATIENT
Start: 2024-03-21 | End: 2024-03-21

## 2024-03-21 RX ORDER — SODIUM CHLORIDE 9 MG/ML
INJECTION, SOLUTION INTRAVENOUS PRN
Status: DISCONTINUED | OUTPATIENT
Start: 2024-03-21 | End: 2024-03-25 | Stop reason: SDUPTHER

## 2024-03-21 RX ORDER — HYDROXYZINE PAMOATE 25 MG/1
25 CAPSULE ORAL 3 TIMES DAILY PRN
Status: DISCONTINUED | OUTPATIENT
Start: 2024-03-21 | End: 2024-03-26 | Stop reason: HOSPADM

## 2024-03-21 RX ORDER — SODIUM CHLORIDE 9 MG/ML
INJECTION, SOLUTION INTRAVENOUS CONTINUOUS
Status: DISCONTINUED | OUTPATIENT
Start: 2024-03-21 | End: 2024-03-21

## 2024-03-21 RX ORDER — SODIUM CHLORIDE 0.9 % (FLUSH) 0.9 %
5-40 SYRINGE (ML) INJECTION EVERY 12 HOURS SCHEDULED
Status: DISCONTINUED | OUTPATIENT
Start: 2024-03-21 | End: 2024-03-26 | Stop reason: HOSPADM

## 2024-03-21 RX ORDER — SODIUM CHLORIDE 0.9 % (FLUSH) 0.9 %
5-40 SYRINGE (ML) INJECTION PRN
Status: DISCONTINUED | OUTPATIENT
Start: 2024-03-21 | End: 2024-03-26 | Stop reason: HOSPADM

## 2024-03-21 RX ORDER — POTASSIUM CHLORIDE 20 MEQ/1
40 TABLET, EXTENDED RELEASE ORAL ONCE
Status: DISCONTINUED | OUTPATIENT
Start: 2024-03-21 | End: 2024-03-21

## 2024-03-21 RX ORDER — ACETAMINOPHEN 325 MG/1
650 TABLET ORAL EVERY 6 HOURS PRN
Status: DISCONTINUED | OUTPATIENT
Start: 2024-03-21 | End: 2024-03-26 | Stop reason: HOSPADM

## 2024-03-21 RX ORDER — ISOSORBIDE MONONITRATE 60 MG/1
60 TABLET, EXTENDED RELEASE ORAL DAILY
Status: DISCONTINUED | OUTPATIENT
Start: 2024-03-21 | End: 2024-03-22

## 2024-03-21 RX ORDER — ACETAMINOPHEN 650 MG/1
650 SUPPOSITORY RECTAL EVERY 6 HOURS PRN
Status: DISCONTINUED | OUTPATIENT
Start: 2024-03-21 | End: 2024-03-26 | Stop reason: HOSPADM

## 2024-03-21 RX ORDER — ALBUTEROL SULFATE 2.5 MG/3ML
2.5 SOLUTION RESPIRATORY (INHALATION) EVERY 4 HOURS PRN
Status: DISCONTINUED | OUTPATIENT
Start: 2024-03-21 | End: 2024-03-26 | Stop reason: HOSPADM

## 2024-03-21 RX ORDER — LEVOFLOXACIN 5 MG/ML
750 INJECTION, SOLUTION INTRAVENOUS ONCE
Status: COMPLETED | OUTPATIENT
Start: 2024-03-21 | End: 2024-03-21

## 2024-03-21 RX ORDER — HYDRALAZINE HYDROCHLORIDE 25 MG/1
25 TABLET, FILM COATED ORAL EVERY 8 HOURS SCHEDULED
Status: DISCONTINUED | OUTPATIENT
Start: 2024-03-21 | End: 2024-03-22

## 2024-03-21 RX ORDER — POTASSIUM CHLORIDE 750 MG/1
40 TABLET, FILM COATED, EXTENDED RELEASE ORAL ONCE
Status: COMPLETED | OUTPATIENT
Start: 2024-03-21 | End: 2024-03-21

## 2024-03-21 RX ORDER — IPRATROPIUM BROMIDE AND ALBUTEROL SULFATE 2.5; .5 MG/3ML; MG/3ML
1 SOLUTION RESPIRATORY (INHALATION)
Status: DISCONTINUED | OUTPATIENT
Start: 2024-03-21 | End: 2024-03-26 | Stop reason: HOSPADM

## 2024-03-21 RX ORDER — POTASSIUM CHLORIDE 20 MEQ/1
20 TABLET, EXTENDED RELEASE ORAL ONCE
Status: COMPLETED | OUTPATIENT
Start: 2024-03-21 | End: 2024-03-21

## 2024-03-21 RX ADMIN — MOMETASONE FUROATE AND FORMOTEROL FUMARATE DIHYDRATE 2 PUFF: 100; 5 AEROSOL RESPIRATORY (INHALATION) at 19:53

## 2024-03-21 RX ADMIN — IPRATROPIUM BROMIDE AND ALBUTEROL SULFATE 1 DOSE: 2.5; .5 SOLUTION RESPIRATORY (INHALATION) at 19:53

## 2024-03-21 RX ADMIN — ENOXAPARIN SODIUM 30 MG: 100 INJECTION SUBCUTANEOUS at 09:15

## 2024-03-21 RX ADMIN — POTASSIUM CHLORIDE 20 MEQ: 1500 TABLET, EXTENDED RELEASE ORAL at 23:18

## 2024-03-21 RX ADMIN — POTASSIUM CHLORIDE 40 MEQ: 1500 TABLET, EXTENDED RELEASE ORAL at 19:49

## 2024-03-21 RX ADMIN — ISOSORBIDE MONONITRATE 60 MG: 60 TABLET, EXTENDED RELEASE ORAL at 15:44

## 2024-03-21 RX ADMIN — MOMETASONE FUROATE AND FORMOTEROL FUMARATE DIHYDRATE 2 PUFF: 100; 5 AEROSOL RESPIRATORY (INHALATION) at 12:26

## 2024-03-21 RX ADMIN — POTASSIUM CHLORIDE 10 MEQ: 7.46 INJECTION, SOLUTION INTRAVENOUS at 14:08

## 2024-03-21 RX ADMIN — POTASSIUM CHLORIDE 10 MEQ: 7.46 INJECTION, SOLUTION INTRAVENOUS at 09:10

## 2024-03-21 RX ADMIN — ALBUTEROL SULFATE 2.5 MG: 2.5 SOLUTION RESPIRATORY (INHALATION) at 17:41

## 2024-03-21 RX ADMIN — DILTIAZEM HYDROCHLORIDE 120 MG: 120 CAPSULE, EXTENDED RELEASE ORAL at 19:49

## 2024-03-21 RX ADMIN — LEVOFLOXACIN 750 MG: 5 INJECTION, SOLUTION INTRAVENOUS at 02:07

## 2024-03-21 RX ADMIN — HYDROXYZINE PAMOATE 25 MG: 25 CAPSULE ORAL at 22:40

## 2024-03-21 RX ADMIN — POTASSIUM CHLORIDE 10 MEQ: 7.46 INJECTION, SOLUTION INTRAVENOUS at 06:02

## 2024-03-21 RX ADMIN — POTASSIUM CHLORIDE AND SODIUM CHLORIDE: 900; 300 INJECTION, SOLUTION INTRAVENOUS at 06:01

## 2024-03-21 RX ADMIN — POTASSIUM CHLORIDE 40 MEQ: 750 TABLET, EXTENDED RELEASE ORAL at 02:05

## 2024-03-21 RX ADMIN — ALBUTEROL SULFATE 2.5 MG: 2.5 SOLUTION RESPIRATORY (INHALATION) at 23:39

## 2024-03-21 RX ADMIN — IPRATROPIUM BROMIDE AND ALBUTEROL SULFATE 1 DOSE: 2.5; .5 SOLUTION RESPIRATORY (INHALATION) at 12:25

## 2024-03-21 RX ADMIN — POTASSIUM CHLORIDE 10 MEQ: 7.46 INJECTION, SOLUTION INTRAVENOUS at 07:45

## 2024-03-21 RX ADMIN — SODIUM CHLORIDE 1000 ML: 9 INJECTION, SOLUTION INTRAVENOUS at 02:05

## 2024-03-21 RX ADMIN — POTASSIUM CHLORIDE 10 MEQ: 7.46 INJECTION, SOLUTION INTRAVENOUS at 15:10

## 2024-03-21 RX ADMIN — Medication 10 ML: at 19:55

## 2024-03-21 RX ADMIN — HYDRALAZINE HYDROCHLORIDE 25 MG: 25 TABLET ORAL at 15:44

## 2024-03-21 RX ADMIN — HYDRALAZINE HYDROCHLORIDE 5 MG: 20 INJECTION, SOLUTION INTRAMUSCULAR; INTRAVENOUS at 23:18

## 2024-03-21 RX ADMIN — IPRATROPIUM BROMIDE AND ALBUTEROL SULFATE 1 DOSE: 2.5; .5 SOLUTION RESPIRATORY (INHALATION) at 02:18

## 2024-03-21 RX ADMIN — HYDRALAZINE HYDROCHLORIDE 25 MG: 25 TABLET ORAL at 21:21

## 2024-03-21 RX ADMIN — POTASSIUM CHLORIDE 10 MEQ: 7.46 INJECTION, SOLUTION INTRAVENOUS at 12:57

## 2024-03-21 RX ADMIN — POTASSIUM CHLORIDE 40 MEQ: 1500 TABLET, EXTENDED RELEASE ORAL at 09:12

## 2024-03-21 RX ADMIN — POTASSIUM CHLORIDE 10 MEQ: 7.46 INJECTION, SOLUTION INTRAVENOUS at 16:17

## 2024-03-21 ASSESSMENT — PAIN SCALES - GENERAL
PAINLEVEL_OUTOF10: 0

## 2024-03-21 ASSESSMENT — PAIN - FUNCTIONAL ASSESSMENT: PAIN_FUNCTIONAL_ASSESSMENT: 0-10

## 2024-03-21 NOTE — CARE COORDINATION
[facility]  Patient is active with [facility] for nurse/PT/OT/MSW.   Recent referral to [facility].   Will follow for discharge to home with orders to resume care.      Ray Duggan RN, BSN CTN  [facility] [phone]

## 2024-03-21 NOTE — DISCHARGE INSTR - COC
Continuity of Care Form    Patient Name: Janae Perez   :  1945  MRN:  [id number]    Admit date:  3/21/2024  Discharge date:  ***    Code Status Order: Limited   Advance Directives:     Admitting Physician:  Wallace Alvarez MD  PCP: Boubacar Dhillon MD    Discharging Nurse: ***  Discharging Hospital Unit/Room#: N7B-9490/2110-01  Discharging Unit Phone Number: ***    Emergency Contact:   Extended Emergency Contact Information  Primary Emergency Contact: Linda Perez  Address: [address] [place] [address]  Home Phone: [phone]  Mobile Phone: [phone]  Relation: Spouse   needed? No  Secondary Emergency Contact: Anastacio Perez   [facility]  Home Phone: [phone]  Mobile Phone: [phone]  Relation: Child    Past Surgical History:  Past Surgical History:   Procedure Laterality Date    ABDOMINAL AORTIC ANEURYSM REPAIR      Endovascular abdominal AA    APPENDECTOMY      incidental    BLADDER SUSPENSION      CAPSULE ENDOSCOPY N/A 2023    ESOPHAGEAL CAPSULE ENDOSCOPY performed by Mishel De Anda MD at [facility] ENDOSCOPY    CARDIAC CATHETERIZATION Right 2022    Cardiomems PA sensor device implant Left PA    CARDIAC SURGERY      CATARACT REMOVAL      CHOLECYSTECTOMY  10/15/2013    COLECTOMY N/A 2023    LAPAROSCOPIC ASSISTED LYSIS OF ADHESIONS AND PUSH ENTEROSCOPY performed by Lokehs Fraser MD at [facility] OR    COLONOSCOPY  2007    dr park and check in 5 years.     COLONOSCOPY  2017    ok dr arndt, repeat 5 years    COLONOSCOPY N/A 5/10/2023    COLONOSCOPY performed by Shade Daily MD at [facility] ENDOSCOPY    HYSTERECTOMY (CERVIX STATUS UNKNOWN)      for benign tumor. just the uterus    JOINT REPLACEMENT  2013    right knee replacement    TONSILLECTOMY  as a child    TUMOR EXCISION      benign behind right ear about     UPPER GASTROINTESTINAL ENDOSCOPY N/A 2023    EGD CONTROL HEMORRHAGE WITH

## 2024-03-21 NOTE — ED TRIAGE NOTES
Pt arrived from home for a c/c of abnormal lab and shortness of breath. States having some SOB when EMS arrived, pt always wears 2L NC, oxygen 99% on normal 2L; EMS gave 1 duoneb en route, pt states breathing got better. States got lab done at PCP yesterday; low K+, PCP sent in     VS noted and stable.Patient A&Ox4. Respirations easy and unlabored. Skin warm and dry and appropriate for ethnicity. No acute distress noted at this time. Patient placed on continuous telemetry and pulse ox upon arrival to room.

## 2024-03-21 NOTE — ED PROVIDER NOTES
I PERSONALLY SAW THE PATIENT AND PERFORMED A SUBSTANTIVE PORTION OF THE VISIT INCLUDING ALL ASPECTS OF THE MEDICAL DECISION MAKING PROCESS.    [facility] EMERGENCY DEPARTMENT  EMERGENCY DEPARTMENT ENCOUNTER      Pt Name: Janae Perez  MRN: [id number]  Birthdate 1945  Date of evaluation: 3/21/2024  Provider: Arnie Butt MD    CHIEF COMPLAINT       Chief Complaint   Patient presents with    Abnormal Lab     States got lab done at PCP yesterday; low K+, PCP sent in    Shortness of Breath     States having some SOB when EMS arrived, pt always wears 2L NC, oxygen 99% on normal 2L; EMS gave 1 duoneb en route, pt states breathing got better       HISTORY OF PRESENT ILLNESS    Janae Perez is a 79 y.o. female who presents to the emergency department with shortness of breath.  Patient presents with shortness of breath from home.  Endorses shortness of breath.  Abnormal labs.  Low K.  Chronic shortness of breath.  Chronically on 2 L.  No pain.  No other associated symptoms.  Not eating or drinking.    Nursing Notes were reviewed. Including nursing noted for FM, Surgical History, Past Medical History, Social History, vitals, and allergies; agree with all.     REVIEW OF SYSTEMS       Review of Systems    Except as noted above the remainder of the review of systems was reviewed and negative.     PAST MEDICAL HISTORY     Past Medical History:   Diagnosis Date    AAA (abdominal aortic aneurysm) (HCC)     pt states it is 4cm    AAA (abdominal aortic aneurysm) without rupture (HCC) 2/10/2015    Atrial fibrillation (HCC)     CAD (coronary artery disease)     CHF (congestive heart failure) (HCC)     COPD (chronic obstructive pulmonary disease) (HCC)     History of blood clots     Hyperlipidemia     Hypertension        SURGICAL HISTORY       Past Surgical History:   Procedure Laterality Date    ABDOMINAL AORTIC ANEURYSM REPAIR      Endovascular abdominal AA    APPENDECTOMY  1990    incidental    BLADDER

## 2024-03-21 NOTE — ED NOTES
Report called to CABRERA Estrada for Rm 2110. Questions addressed and report accepted. Transport to be performed shortly.

## 2024-03-21 NOTE — CARE COORDINATION
03/21/24 1046   Readmission Assessment   Number of Days since last admission? 8-30 days   Previous Disposition Home with Home Health   Who is being Interviewed Patient   What was the patient's/caregiver's perception as to why they think they needed to return back to the hospital? Other (Comment)  (abnormal labs plus she couldn't breathe.)   Did you visit your Primary Care Physician after you left the hospital, before you returned this time? Yes   Did you see a specialist, such as Cardiac, Pulmonary, Orthopedic Physician, etc. after you left the hospital? No   Who advised the patient to return to the hospital? Physician;Self-referral   Does the patient report anything that got in the way of taking their medications? No   In our efforts to provide the best possible care to you and others like you, can you think of anything that we could have done to help you after you left the hospital the first time, so that you might not have needed to return so soon? Other (Comment)  (unsure at the present time.)       Respectfully submitted,    Crala RYAN, ERON  [facility]   [phone]    Electronically signed by CONNOR Corona, SEB on 3/21/2024 at 10:47 AM

## 2024-03-21 NOTE — TELEPHONE ENCOUNTER
Critical result: potassium 2.5    Discussed risk associated with severe hypokalemia and recommended she present to the ER for redraw and necessary treatment. She is agreeable and her  will drive her.

## 2024-03-21 NOTE — DISCHARGE INSTRUCTIONS
Extra Heart Failure Education/ Tools/ Resources:     https://[employer].com/publication/?l=207271   --- this is American Heart Association interactive Healthier Living with Heart Failure guidebook.  Please click hyperlink or copy / paste link into search bar. The QR Code is also available below. Use your mouse to scroll through the pages.  Lots of information about weight monitoring, diet tips, activity, meds, etc    Heart Failure Tools and Resources QR Code is below. It includes multiple resources to include symptom tracker, med tracker, further HF info, and access to a HF Support Network online Community    HF Ludell Maxwell  -- this is a free smart phone maxwell available for iPhone and Android download.  Use your phone to track sodium / fluid intake, zone tool symptom tracking, weights, medications, etc. Click on this hyperlink  HF Ludell Maxwell   for QR code for easy download or the link is also found in the below HF Tools and Resources.      DASH (Dietary Approach to Stop Hypertension) diet --  https://www.nhlbi.nih.gov/education/dash-eating-plan -- this diet is a flexible eating plan that promotes heart healthy eating style.  Click on hyperlink or copy / paste link into search bar.  Lots of low sodium recipes and tips.    https://www.[employer].VTM/recipes  -- more free recipes

## 2024-03-21 NOTE — CARE COORDINATION
Case Management Assessment  Initial Evaluation    Date/Time of Evaluation: 3/21/2024 10:55 AM  Assessment Completed by: CONNOR Corona, SEB    If patient is discharged prior to next notation, then this note serves as note for discharge by case management.    Patient Name: Janae Perez                   YOB: 1945  Diagnosis: Hypokalemia [E87.6]  MARK (acute kidney injury) (HCC) [N17.9]                   Date / Time: 3/21/2024  1:01 AM    Patient Admission Status: Inpatient   Readmission Risk (Low < 19, Mod (19-27), High > 27): Readmission Risk Score: 37.6    Current PCP: Boubacar Dhillon MD  PCP verified by CM? Yes    Chart Reviewed: Yes      History Provided by: Patient  Patient Orientation: Alert and Oriented    Patient Cognition: Alert    Hospitalization in the last 30 days (Readmission):  Yes    If yes, Readmission Assessment in CM Navigator will be completed.    Advance Directives:      Code Status: Full Code   Patient's Primary Decision Maker is: Named in Scanned ACP Document    Primary Decision Maker: Linda Perez D - Spouse - [phone]    Secondary Decision Maker: Anastacio Perez Child - [phone]    Discharge Planning:    Patient lives with: Spouse/Significant Other Type of Home: Trailer/Mobile Home  Primary Care Giver: Self  Patient Support Systems include:     Current Financial resources: Medicare  Current community resources: ECF/Home Care  Current services prior to admission: Durable Medical Equipment, Oxygen Therapy, Home Care            Current DME: Walker, Wheelchair, Other (Comment) (BP cuff)            Type of Home Care services:  PT, OT, Nursing Services    ADLS  Prior functional level: Assistance with the following:, Cooking, Housework, Shopping, Bathing, Other (see comment)  Current functional level: Other (see comment) (TBD.)    PT AM-PAC:   /24  OT AM-PAC:   /24    Family can provide assistance at DC: Yes  Would you like Case Management to discuss the discharge plan with any

## 2024-03-21 NOTE — H&P
V2.0  History and Physical      Name:  Janae Perez /Age/Sex: 1945  (79 y.o. female)   MRN & CSN:  [id number] & [id number] Encounter Date/Time: 3/21/2024 5:35 PM EDT   Location:  [id number] PCP: Boubacar Dhillon MD       Hospital Day: 1    Assessment and Plan:   Janae Perez is a 79 y.o. female with medical history significant for oxygen dependent COPD, stage III CKD, hypertension, CAD, chronic diastolic heart failure, PE, paroxysmal A-fib, dyslipidemia and gout who presents with severe hypokalemia.    Assessment and Plan  1.  Severe hypokalemia with potassium of 2.3.  There are some T wave flattening.  Will supplement orally and IV and repeat 1 hour after supplementation.  Hold torsemide and metolazone.  Monitor on telemetry.    2.  CKD stage III.   Nephrology evaluation is pending.  Avoid nephrotoxic medication.    3.  Oxygen dependent COPD.  She is on 2 L oxygen at home.  Continue home inhalers and DuoNeb.    4.  Acute on chronic chronic diastolic heart failure.  EF of 65 to 70% on TTE from 2024.  She has bibasal crackles and lower extremity edema.  proBNP is elevated and chest x-ray on personal review is concerning for pulmonary edema.  Will discontinue IV fluid.  Will defer diuretics to nephrology/cardiology.  Monitor intake output and daily body weight.    5.  CAD.  Continue statin.    6.  Paroxysmal A-fib/PE.  Continue Xarelto.    7.  Hypertension.  Continue hydralazine 50 mg 3 times daily, diltiazem 120 mg twice daily and clonidine 0.1 mg 3 times daily.  Monitor.    8.  History of gout.  Continue renally adjusted dose of allopurinol.    9.  Dyslipidemia.  Continue atorvastatin 40 mg daily.    10.  Elevated troponin likely due to type II NSTEMI.  EKG without finding of NSTEMI.    DVT prophylaxis:Lovenox     Disposition: TBD depending on further hospital course.    Disposition:   Current Living situation: Home  Expected Disposition: Home  Estimated D/C: 2 to 3 days    Diet ADULT DIET;

## 2024-03-21 NOTE — ACP (ADVANCE CARE PLANNING)
recorded wishes    Electronically signed by Karyna GONZÁLESN, RN, CHPN on 3/21/2024 at 1:54 PM  Palliative Care Nurse  Phone [phone]

## 2024-03-22 ENCOUNTER — APPOINTMENT (OUTPATIENT)
Dept: GENERAL RADIOLOGY | Age: 79
DRG: 682 | End: 2024-03-22
Payer: MEDICARE

## 2024-03-22 LAB
ALBUMIN SERPL-MCNC: 3.8 G/DL (ref 3.4–5)
ANION GAP SERPL CALCULATED.3IONS-SCNC: 13 MMOL/L (ref 3–16)
BASE EXCESS BLDA CALC-SCNC: 7.3 MMOL/L (ref -3–3)
BASOPHILS # BLD: 0.1 K/UL (ref 0–0.2)
BASOPHILS NFR BLD: 0.3 %
BUN SERPL-MCNC: 56 MG/DL (ref 7–20)
CALCIUM SERPL-MCNC: 9.6 MG/DL (ref 8.3–10.6)
CHLORIDE SERPL-SCNC: 93 MMOL/L (ref 99–110)
CO2 BLDA-SCNC: 34.7 MMOL/L
CO2 SERPL-SCNC: 30 MMOL/L (ref 21–32)
COHGB MFR BLDA: 1.8 % (ref 0–1.5)
CREAT 24H UR-MRATE: 1.2 G/24HR (ref 0.6–1.5)
CREAT SERPL-MCNC: 2 MG/DL (ref 0.6–1.2)
DEPRECATED RDW RBC AUTO: 18.9 % (ref 12.4–15.4)
EOSINOPHIL # BLD: 0.1 K/UL (ref 0–0.6)
EOSINOPHIL NFR BLD: 0.8 %
GFR SERPLBLD CREATININE-BSD FMLA CKD-EPI: 25 ML/MIN/{1.73_M2}
GLUCOSE SERPL-MCNC: 144 MG/DL (ref 70–99)
HCO3 BLDA-SCNC: 33.1 MMOL/L (ref 21–29)
HCT VFR BLD AUTO: 30.5 % (ref 36–48)
HGB BLD-MCNC: 10.2 G/DL (ref 12–16)
HGB BLDA-MCNC: 12 G/DL (ref 12–16)
LYMPHOCYTES # BLD: 0.8 K/UL (ref 1–5.1)
LYMPHOCYTES NFR BLD: 5.6 %
MCH RBC QN AUTO: 29.3 PG (ref 26–34)
MCHC RBC AUTO-ENTMCNC: 33.5 G/DL (ref 31–36)
MCV RBC AUTO: 87.6 FL (ref 80–100)
METHGB MFR BLDA: 0 %
MONOCYTES # BLD: 0.9 K/UL (ref 0–1.3)
MONOCYTES NFR BLD: 5.9 %
NEUTROPHILS # BLD: 13.1 K/UL (ref 1.7–7.7)
NEUTROPHILS NFR BLD: 87.4 %
O2 THERAPY: ABNORMAL
PCO2 BLDA: 51.6 MMHG (ref 35–45)
PH BLDA: 7.42 [PH] (ref 7.35–7.45)
PHOSPHATE SERPL-MCNC: 2.7 MG/DL (ref 2.5–4.9)
PLATELET # BLD AUTO: 203 K/UL (ref 135–450)
PMV BLD AUTO: 9.5 FL (ref 5–10.5)
PO2 BLDA: 140 MMHG (ref 75–108)
POTASSIUM SERPL-SCNC: 3.8 MMOL/L (ref 3.5–5.1)
RBC # BLD AUTO: 3.48 M/UL (ref 4–5.2)
REPORT: NORMAL
REPORT: NORMAL
RESP PATH DNA+RNA PNL L RESP NAA+NON-PRB: NORMAL
RESP PATH DNA+RNA PNL NPH NAA+NON-PROBE: NORMAL
SAO2 % BLDA: >100 %
SODIUM SERPL-SCNC: 136 MMOL/L (ref 136–145)
SPECIMEN VOL 24H UR: 1900 ML
WBC # BLD AUTO: 15 K/UL (ref 4–11)

## 2024-03-22 PROCEDURE — 94660 CPAP INITIATION&MGMT: CPT

## 2024-03-22 PROCEDURE — 6360000002 HC RX W HCPCS: Performed by: REGISTERED NURSE

## 2024-03-22 PROCEDURE — 6370000000 HC RX 637 (ALT 250 FOR IP): Performed by: REGISTERED NURSE

## 2024-03-22 PROCEDURE — 87070 CULTURE OTHR SPECIMN AEROBIC: CPT

## 2024-03-22 PROCEDURE — 2000000000 HC ICU R&B

## 2024-03-22 PROCEDURE — 71045 X-RAY EXAM CHEST 1 VIEW: CPT

## 2024-03-22 PROCEDURE — 99232 SBSQ HOSP IP/OBS MODERATE 35: CPT | Performed by: NURSE PRACTITIONER

## 2024-03-22 PROCEDURE — 97166 OT EVAL MOD COMPLEX 45 MIN: CPT

## 2024-03-22 PROCEDURE — 36600 WITHDRAWAL OF ARTERIAL BLOOD: CPT

## 2024-03-22 PROCEDURE — 36415 COLL VENOUS BLD VENIPUNCTURE: CPT

## 2024-03-22 PROCEDURE — 6370000000 HC RX 637 (ALT 250 FOR IP): Performed by: INTERNAL MEDICINE

## 2024-03-22 PROCEDURE — 2700000000 HC OXYGEN THERAPY PER DAY

## 2024-03-22 PROCEDURE — 85025 COMPLETE CBC W/AUTO DIFF WBC: CPT

## 2024-03-22 PROCEDURE — 97530 THERAPEUTIC ACTIVITIES: CPT

## 2024-03-22 PROCEDURE — 87633 RESP VIRUS 12-25 TARGETS: CPT

## 2024-03-22 PROCEDURE — 2500000003 HC RX 250 WO HCPCS: Performed by: INTERNAL MEDICINE

## 2024-03-22 PROCEDURE — 94761 N-INVAS EAR/PLS OXIMETRY MLT: CPT

## 2024-03-22 PROCEDURE — 82803 BLOOD GASES ANY COMBINATION: CPT

## 2024-03-22 PROCEDURE — 6360000002 HC RX W HCPCS: Performed by: INTERNAL MEDICINE

## 2024-03-22 PROCEDURE — 0202U NFCT DS 22 TRGT SARS-COV-2: CPT

## 2024-03-22 PROCEDURE — 2580000003 HC RX 258: Performed by: HOSPITALIST

## 2024-03-22 PROCEDURE — 87205 SMEAR GRAM STAIN: CPT

## 2024-03-22 PROCEDURE — 5A09457 ASSISTANCE WITH RESPIRATORY VENTILATION, 24-96 CONSECUTIVE HOURS, CONTINUOUS POSITIVE AIRWAY PRESSURE: ICD-10-PCS | Performed by: INTERNAL MEDICINE

## 2024-03-22 PROCEDURE — 99223 1ST HOSP IP/OBS HIGH 75: CPT | Performed by: INTERNAL MEDICINE

## 2024-03-22 PROCEDURE — 94640 AIRWAY INHALATION TREATMENT: CPT

## 2024-03-22 PROCEDURE — 97162 PT EVAL MOD COMPLEX 30 MIN: CPT

## 2024-03-22 PROCEDURE — 6360000002 HC RX W HCPCS: Performed by: HOSPITALIST

## 2024-03-22 PROCEDURE — 6370000000 HC RX 637 (ALT 250 FOR IP): Performed by: NURSE PRACTITIONER

## 2024-03-22 PROCEDURE — 80069 RENAL FUNCTION PANEL: CPT

## 2024-03-22 PROCEDURE — 6370000000 HC RX 637 (ALT 250 FOR IP): Performed by: HOSPITALIST

## 2024-03-22 RX ORDER — ISOSORBIDE MONONITRATE 60 MG/1
120 TABLET, EXTENDED RELEASE ORAL DAILY
Status: DISCONTINUED | OUTPATIENT
Start: 2024-03-23 | End: 2024-03-26 | Stop reason: HOSPADM

## 2024-03-22 RX ORDER — CLONIDINE HYDROCHLORIDE 0.1 MG/1
0.1 TABLET ORAL 3 TIMES DAILY
Status: DISCONTINUED | OUTPATIENT
Start: 2024-03-22 | End: 2024-03-26 | Stop reason: HOSPADM

## 2024-03-22 RX ORDER — TORSEMIDE 20 MG/1
20 TABLET ORAL 3 TIMES DAILY
Status: DISCONTINUED | OUTPATIENT
Start: 2024-03-23 | End: 2024-03-24

## 2024-03-22 RX ORDER — HYDRALAZINE HYDROCHLORIDE 50 MG/1
50 TABLET, FILM COATED ORAL EVERY 8 HOURS SCHEDULED
Status: DISCONTINUED | OUTPATIENT
Start: 2024-03-22 | End: 2024-03-26 | Stop reason: HOSPADM

## 2024-03-22 RX ORDER — FUROSEMIDE 10 MG/ML
60 INJECTION INTRAMUSCULAR; INTRAVENOUS ONCE
Status: COMPLETED | OUTPATIENT
Start: 2024-03-22 | End: 2024-03-22

## 2024-03-22 RX ORDER — METOPROLOL TARTRATE 1 MG/ML
5 INJECTION, SOLUTION INTRAVENOUS EVERY 6 HOURS PRN
Status: DISCONTINUED | OUTPATIENT
Start: 2024-03-22 | End: 2024-03-26 | Stop reason: HOSPADM

## 2024-03-22 RX ORDER — LORAZEPAM 1 MG/1
1 TABLET ORAL ONCE
Status: COMPLETED | OUTPATIENT
Start: 2024-03-22 | End: 2024-03-22

## 2024-03-22 RX ORDER — CLONIDINE HYDROCHLORIDE 0.1 MG/1
0.2 TABLET ORAL ONCE
Status: COMPLETED | OUTPATIENT
Start: 2024-03-22 | End: 2024-03-22

## 2024-03-22 RX ORDER — ISOSORBIDE MONONITRATE 60 MG/1
60 TABLET, EXTENDED RELEASE ORAL ONCE
Status: COMPLETED | OUTPATIENT
Start: 2024-03-22 | End: 2024-03-22

## 2024-03-22 RX ORDER — TORSEMIDE 20 MG/1
20 TABLET ORAL 3 TIMES DAILY
Status: DISCONTINUED | OUTPATIENT
Start: 2024-03-22 | End: 2024-03-22

## 2024-03-22 RX ADMIN — Medication 10 ML: at 08:03

## 2024-03-22 RX ADMIN — IPRATROPIUM BROMIDE AND ALBUTEROL SULFATE 1 DOSE: 2.5; .5 SOLUTION RESPIRATORY (INHALATION) at 07:51

## 2024-03-22 RX ADMIN — POTASSIUM CHLORIDE 40 MEQ: 1500 TABLET, EXTENDED RELEASE ORAL at 19:57

## 2024-03-22 RX ADMIN — CLONIDINE HYDROCHLORIDE 0.1 MG: 0.1 TABLET ORAL at 19:58

## 2024-03-22 RX ADMIN — CLONIDINE HYDROCHLORIDE 0.2 MG: 0.1 TABLET ORAL at 01:21

## 2024-03-22 RX ADMIN — HYDROXYZINE PAMOATE 25 MG: 25 CAPSULE ORAL at 15:18

## 2024-03-22 RX ADMIN — Medication 10 ML: at 20:05

## 2024-03-22 RX ADMIN — HYDROXYZINE PAMOATE 25 MG: 25 CAPSULE ORAL at 19:59

## 2024-03-22 RX ADMIN — MOMETASONE FUROATE AND FORMOTEROL FUMARATE DIHYDRATE 2 PUFF: 100; 5 AEROSOL RESPIRATORY (INHALATION) at 19:41

## 2024-03-22 RX ADMIN — HYDRALAZINE HYDROCHLORIDE 5 MG: 20 INJECTION, SOLUTION INTRAMUSCULAR; INTRAVENOUS at 08:09

## 2024-03-22 RX ADMIN — DILTIAZEM HYDROCHLORIDE 120 MG: 120 CAPSULE, EXTENDED RELEASE ORAL at 08:02

## 2024-03-22 RX ADMIN — POTASSIUM CHLORIDE 40 MEQ: 1500 TABLET, EXTENDED RELEASE ORAL at 08:03

## 2024-03-22 RX ADMIN — HYDRALAZINE HYDROCHLORIDE 5 MG: 20 INJECTION, SOLUTION INTRAMUSCULAR; INTRAVENOUS at 18:08

## 2024-03-22 RX ADMIN — HYDRALAZINE HYDROCHLORIDE 5 MG: 20 INJECTION, SOLUTION INTRAMUSCULAR; INTRAVENOUS at 04:03

## 2024-03-22 RX ADMIN — IPRATROPIUM BROMIDE AND ALBUTEROL SULFATE 1 DOSE: 2.5; .5 SOLUTION RESPIRATORY (INHALATION) at 19:41

## 2024-03-22 RX ADMIN — FUROSEMIDE 60 MG: 10 INJECTION, SOLUTION INTRAMUSCULAR; INTRAVENOUS at 10:32

## 2024-03-22 RX ADMIN — HYDRALAZINE HYDROCHLORIDE 50 MG: 50 TABLET ORAL at 20:11

## 2024-03-22 RX ADMIN — ISOSORBIDE MONONITRATE 60 MG: 60 TABLET, EXTENDED RELEASE ORAL at 09:21

## 2024-03-22 RX ADMIN — CLONIDINE HYDROCHLORIDE 0.1 MG: 0.1 TABLET ORAL at 09:21

## 2024-03-22 RX ADMIN — IPRATROPIUM BROMIDE AND ALBUTEROL SULFATE 1 DOSE: 2.5; .5 SOLUTION RESPIRATORY (INHALATION) at 12:10

## 2024-03-22 RX ADMIN — ISOSORBIDE MONONITRATE 60 MG: 60 TABLET, EXTENDED RELEASE ORAL at 08:03

## 2024-03-22 RX ADMIN — CLONIDINE HYDROCHLORIDE 0.1 MG: 0.1 TABLET ORAL at 15:18

## 2024-03-22 RX ADMIN — DILTIAZEM HYDROCHLORIDE 120 MG: 120 CAPSULE, EXTENDED RELEASE ORAL at 19:59

## 2024-03-22 RX ADMIN — MOMETASONE FUROATE AND FORMOTEROL FUMARATE DIHYDRATE 2 PUFF: 100; 5 AEROSOL RESPIRATORY (INHALATION) at 07:51

## 2024-03-22 RX ADMIN — ENOXAPARIN SODIUM 30 MG: 100 INJECTION SUBCUTANEOUS at 08:03

## 2024-03-22 RX ADMIN — HYDRALAZINE HYDROCHLORIDE 50 MG: 50 TABLET ORAL at 15:18

## 2024-03-22 RX ADMIN — LORAZEPAM 1 MG: 1 TABLET ORAL at 06:11

## 2024-03-22 RX ADMIN — HYDROXYZINE PAMOATE 25 MG: 25 CAPSULE ORAL at 08:03

## 2024-03-22 RX ADMIN — HYDRALAZINE HYDROCHLORIDE 25 MG: 25 TABLET ORAL at 05:35

## 2024-03-22 RX ADMIN — METOPROLOL TARTRATE 5 MG: 5 INJECTION, SOLUTION INTRAVENOUS at 06:11

## 2024-03-22 ASSESSMENT — PAIN SCALES - GENERAL
PAINLEVEL_OUTOF10: 0

## 2024-03-22 NOTE — CONSULTS
Pulmonary Consult Note     Patient's name:  Janae Perez  Medical Record Number: [id number]  Patient's account/billing number: [id number]  Patient's YOB: 1945  Age: 79 y.o.  Date of Admission: 3/21/2024  1:01 AM  Date of Consult: 3/22/2024      Primary Care Physician: Boubacar Dhillon MD      Code Status: Limited    Reason for consult: Acute on chronic hypoxic and hypercapnic respiratory failure    Assessment and Plan     Acute on chronic hypoxic and hypercapnic respiratory failure  Acute on chronic diastolic heart failure  Severe COPD  DENISE OHS  A-fib on anticoagulation  Acute kidney injury on CKD 3        Plan:  There is significant increased work of breathing will give a trial of NIV  Titrate oxygen to keep sats more than 90%.  Diuresis.    Bronchodilators Dulera and DuoNeb 4 times daily  On anticoagulation    HISTORY OF PRESENT ILLNESS:   MrGaetano/Ms. Janae Perez is a 79 y.o. with past medical history stated below significant for history of severe COPD patient with Dr. Medina, coronary artery disease, chronic CHF, atrial fibrillation, CKD 3, paroxysmal atrial fibrillation obesity,   Presented to the emergency department with severe hypokalemia she was also complaining of shortness of breath  Recently discharged from the hospital.  Chest x-ray with CHF.    Past Medical History:        Diagnosis Date    AAA (abdominal aortic aneurysm) ([facility])     pt states it is 4cm    AAA (abdominal aortic aneurysm) without rupture ([facility]) 2/10/2015    Atrial fibrillation (HCC)     CAD (coronary artery disease)     CHF (congestive heart failure) (HCC)     COPD (chronic obstructive pulmonary disease) (HCC)     History of blood clots     Hyperlipidemia     Hypertension        Past Surgical History:        Procedure Laterality Date    ABDOMINAL AORTIC ANEURYSM REPAIR      Endovascular abdominal AA    APPENDECTOMY  1990    incidental    BLADDER SUSPENSION      CAPSULE ENDOSCOPY 
                  [facility]          [address] [place] [address]                              CONSULTATION      PATIENT NAME: KELLEN LUDWIG               : 1945  MED REC NO: [id number]                      ROOM: [place] [address]  ACCOUNT NO: [id number]                       ADMIT DATE: 2024  PROVIDER: Kenyatta Abbott MD      REASON FOR CONSULTATION:  Acute kidney injury with hypokalemia.    HISTORY OF PRESENTING ILLNESS:  This is a 79-year-old  female with past medical history significant for obesity, hypertension, COPD, chronic respiratory failure, hypercholesterolemia, coronary artery disease, chronic Afib, chronic kidney disease stage 4, sent to ER because of abnormal labs.  The patient has also been complaining of shortness breath and dyspnea on exertion.  At the time of presentation, the patient was found to be hypokalemic with a potassium of 2.3 with acute kidney injury.  Admitted in ICU for further workup and management.  At the time of consultation, the patient is feeling and breathing better.  Admits improvement in shortness of breath.  Denies any nausea, vomiting, fever, chills, or rigors.  The patient admits being treated for UTI recently (details not available).  The patient is still complaining of some dysuria and some increased frequency of urination, but denies any fever.  Overnight, the patient is being treated for congestive heart failure as well as potassium being supplemented.    PAST MEDICAL HISTORY:    1. Obesity.  2. Abdominal aortic aneurysm, 4 cm.  3. Coronary artery disease.  4. Chronic Afib.  5. Congestive heart failure.  6. DVT.  7. Hypercholesterolemia.  8. Hypertension.  9. Chronic respiratory failure with COPD, on home oxygen.    PAST SURGICAL HISTORY:    1. Status post abdominal aortic aneurysm repair.  2. Status post appendectomy.  3. Status post bladder suspension surgery.  4. Status post endoscopy.  5. Status post right knee 
  Cardiology Consultation   Date: 3/21/2024  Admit Date:  3/21/2024  Admission Diagnosis: Hypokalemia [E87.6]  MARK (acute kidney injury) ([facility]) [N17.9]     Reason for Consultation: heart failure  Consult Requesting Physician: Bernard Lewis MD       History of Present Illness  Janae Perez is a 79 y.o. year old female with past medical history significant for CAD, AF s/p PVI ablation 10/2020- recurrent AF with DCCV 1/2021 and repeat AF ablation 2/2021 (failed flecaininde), HTN, aortic aneurysm, AAA s/p repair 3/2018 (Dr. Harrison)COPD, DENISE (intolerant to CPAP)..  Abn stress>[facility] 3/2018 revealed  of RCA and non obstructive dz of LAD and LCX.  Adm to [facility] 5/2022 for dofetilide loading, converted to NSR.  Abn Stress followed by [facility] 9/20/2022 showed CAD.  S/P Cardiomems PA sensor implant 11/2022. Non complaint with readings.  GIB 4/2023 & 5/2023 requiring multiple units of PRBC>EGD with gastric AVMs.   Hx UTIs w confusion  Hx parotiditis     Multiple hospitalizations for resp failure and HF   Hospitalized  with COPD and HF  Most recent adm 2/29-3/3/2024 w acute on chronic resp failure R/T COPD and HF.  FU w Dr. Lopez in office 3/11/2024. Continued with SOB on 3L NC and appeared decompensated. Family reports non compliance with low Na diet and 64 fl oz  restriction. Added metolazone 2.5mg MWF. She was suppose to FU in 2 weeks.    Last cardiomems reading 3/18/2024 PAD 17 (Goal 20)    Sent to ED 3.20.24 by PCP for abn labs. K 2.4.   BUN/Creat 69/24, CHL 88/ CO2 37.     Today Janae is resting in bed on 2L NC- her baseline.    at bedside, they tell me she was diuresing well with metolazone at home.   Noticed decreasing BLE edema.   She has chronic SOB that is multifactorial D/T severe COPD.   She has not been doing home cardiomems readings  Denies CP, syncope.   C/O generalized fatigue.      Past Medical History:   Diagnosis Date    AAA (abdominal aortic aneurysm) ([facility])     pt states it is 4cm    AAA (abdominal 
  Nephrology Consult Note   [email or web address]      Reason for consultation: MARK on CKD 3b/4 -- baseline Cr ~ 1.7-1.9 mg/dL. Follows with Dr. Obregon in office -- last seen 3/14/2024.     History of Present Illness: Janae Perez is a 80 yo female with a PMHx of CKD 3b/4, hx of MARK, afib, CAD, COPD on 2-3 L NC @ baseline, HF, HLD, HTN. Patient presents to  ED on 3/21/2024 with complaints of abnormal lab values. Spouse states pt had routine blood work drawn and was told to come to the hospital for hypokalemia. Pt states she has also been experiencing an increase in SOB -- states this has been going on for some time. K+ upon admission was 2.3 mmol/L. She received 40 meq of KCl PO. Repeat K was 2.4 mmol/L. This AM, pt has received 40 meq of PO KCl and is in the process of receiving 40 meq of IV Kcl (bag 3 out of 4). CT Chest (3/21/2024) is negative for pulm edema or any other acute process.      We have been consulted for MARK on CKD 3b/4 and hypokalemia management. Cr upon admission is 2.3>2.4 mg/dL. Last known PTA Cr is 1.9 mg/dL on 3/13/2024. Patient takes torsemide 20 mg TID and metolazone 2.5 mg TIW. Spouse states she has taken a K+ supplement in the past but does not anymore. Denies N/V/D. Eating and drinking OK. States she was recently treated for a UTI -- she is unsure when this was or what medication she was prescribed. Endorses current dysuria. Denies other  symptoms. She was seen by [facility] inpatient 2/2024 -- at this time she was being treated for volume overload and was treated with a lasix gtt. Pt is currently 2.7 kg below her discharge weight of 102.7 kg from the aforementioned admission. SBP this AM is 103 mmHg.    Subjective:      Patient seen and examined. Labs and chart reviewed. Resting in bed on 3 L NC.     Patient review of systems: Endorses SOB. See HPI for other pertinent ROS.     Past Medical History:   Diagnosis Date    AAA (abdominal aortic aneurysm) (HCC)     pt states it is 4cm    AAA (abdominal 
Matthias Belcher MD at [facility] ENDOSCOPY    UPPER GASTROINTESTINAL ENDOSCOPY N/A 5/24/2023    ENTEROSCOPY PUSH DIAGNOSTIC performed by Mishel De Anda MD at [facility] ENDOSCOPY       FAMILY HISTORY  Family History   Problem Relation Age of Onset    Heart Disease Father     Hypertension Other        SOCIAL HISTORY  Social History     Tobacco Use    Smoking status: Former     Current packs/day: 0.00     Average packs/day: 1 pack/day for 37.0 years (37.0 ttl pk-yrs)     Types: Cigarettes     Start date: 12/2/1976     Quit date: 9/17/2013     Years since quitting: 10.5     Passive exposure: Past    Smokeless tobacco: Former    Tobacco comments:     E cigarette now and then   Vaping Use    Vaping Use: Never used   Substance Use Topics    Alcohol use: No    Drug use: No       ALLERGIES  Allergies   Allergen Reactions    Morphine Rash     rash       MEDICATIONS  No current facility-administered medications on file prior to encounter.     Current Outpatient Medications on File Prior to Encounter   Medication Sig Dispense Refill    Nebulizer System All-In-One MISC by Does not apply route as needed      metOLazone (ZAROXOLYN) 2.5 MG tablet 30 minutes prior to morning torsemide dose on M,W,F 16 tablet 6    albuterol (PROVENTIL) (2.5 MG/3ML) 0.083% nebulizer solution Take 3 mLs by nebulization every 4 hours as needed for Wheezing 360 mL 11    budesonide-formoterol (SYMBICORT) 80-4.5 MCG/ACT AERO Inhale 2 puffs into the lungs 2 times daily 10.2 g 3    dilTIAZem (CARDIZEM CD) 120 MG extended release capsule TAKE ONE CAPSULE BY MOUTH TWICE A DAY ONCE IN THE MORNING AND THEN AGAIN AT NIGHT AT BEDTIME 180 capsule 0    torsemide (DEMADEX) 20 MG tablet Take 1 tablet by mouth in the morning, at noon, and at bedtime 90 tablet 3    hydrOXYzine pamoate (VISTARIL) 25 MG capsule Take 1 capsule by mouth daily as needed for Anxiety 30 capsule 0    hydrALAZINE (APRESOLINE) 50 MG tablet Take 1 tablet by mouth every 8 hours 90 tablet 0    cloNIDine

## 2024-03-23 ENCOUNTER — APPOINTMENT (OUTPATIENT)
Dept: GENERAL RADIOLOGY | Age: 79
DRG: 682 | End: 2024-03-23
Payer: MEDICARE

## 2024-03-23 LAB
ALBUMIN SERPL-MCNC: 3.5 G/DL (ref 3.4–5)
ANION GAP SERPL CALCULATED.3IONS-SCNC: 11 MMOL/L (ref 3–16)
BASE EXCESS BLDV CALC-SCNC: 8.7 MMOL/L
BASOPHILS # BLD: 0.1 K/UL (ref 0–0.2)
BASOPHILS NFR BLD: 0.6 %
BUN SERPL-MCNC: 51 MG/DL (ref 7–20)
CALCIUM SERPL-MCNC: 9.3 MG/DL (ref 8.3–10.6)
CHLORIDE SERPL-SCNC: 96 MMOL/L (ref 99–110)
CO2 BLDV-SCNC: 36 MMOL/L
CO2 SERPL-SCNC: 31 MMOL/L (ref 21–32)
COHGB MFR BLDV: 2.1 %
CREAT SERPL-MCNC: 1.7 MG/DL (ref 0.6–1.2)
DEPRECATED RDW RBC AUTO: 18.6 % (ref 12.4–15.4)
EOSINOPHIL # BLD: 0.3 K/UL (ref 0–0.6)
EOSINOPHIL NFR BLD: 2.5 %
GFR SERPLBLD CREATININE-BSD FMLA CKD-EPI: 30 ML/MIN/{1.73_M2}
GLUCOSE SERPL-MCNC: 113 MG/DL (ref 70–99)
HCO3 BLDV-SCNC: 34 MMOL/L (ref 23–29)
HCT VFR BLD AUTO: 28.2 % (ref 36–48)
HGB BLD-MCNC: 9.5 G/DL (ref 12–16)
LYMPHOCYTES # BLD: 0.7 K/UL (ref 1–5.1)
LYMPHOCYTES NFR BLD: 6.5 %
MCH RBC QN AUTO: 29.7 PG (ref 26–34)
MCHC RBC AUTO-ENTMCNC: 33.8 G/DL (ref 31–36)
MCV RBC AUTO: 87.9 FL (ref 80–100)
METHGB MFR BLDV: 0.3 %
MONOCYTES # BLD: 0.7 K/UL (ref 0–1.3)
MONOCYTES NFR BLD: 6.5 %
NEUTROPHILS # BLD: 9.3 K/UL (ref 1.7–7.7)
NEUTROPHILS NFR BLD: 83.9 %
NT-PROBNP SERPL-MCNC: ABNORMAL PG/ML (ref 0–449)
O2 THERAPY: ABNORMAL
PCO2 BLDV: 52.6 MMHG (ref 40–50)
PH BLDV: 7.42 [PH] (ref 7.35–7.45)
PHOSPHATE SERPL-MCNC: 3 MG/DL (ref 2.5–4.9)
PLATELET # BLD AUTO: 177 K/UL (ref 135–450)
PMV BLD AUTO: 10 FL (ref 5–10.5)
PO2 BLDV: 47 MMHG
POTASSIUM SERPL-SCNC: 4.4 MMOL/L (ref 3.5–5.1)
POTASSIUM SERPL-SCNC: 4.4 MMOL/L (ref 3.5–5.1)
RBC # BLD AUTO: 3.21 M/UL (ref 4–5.2)
SAO2 % BLDV: 83 %
SODIUM SERPL-SCNC: 138 MMOL/L (ref 136–145)
WBC # BLD AUTO: 11 K/UL (ref 4–11)

## 2024-03-23 PROCEDURE — 2580000003 HC RX 258: Performed by: HOSPITALIST

## 2024-03-23 PROCEDURE — 2700000000 HC OXYGEN THERAPY PER DAY

## 2024-03-23 PROCEDURE — 6360000002 HC RX W HCPCS: Performed by: HOSPITALIST

## 2024-03-23 PROCEDURE — 85025 COMPLETE CBC W/AUTO DIFF WBC: CPT

## 2024-03-23 PROCEDURE — 6370000000 HC RX 637 (ALT 250 FOR IP): Performed by: INTERNAL MEDICINE

## 2024-03-23 PROCEDURE — 94640 AIRWAY INHALATION TREATMENT: CPT

## 2024-03-23 PROCEDURE — 6370000000 HC RX 637 (ALT 250 FOR IP): Performed by: HOSPITALIST

## 2024-03-23 PROCEDURE — 6370000000 HC RX 637 (ALT 250 FOR IP): Performed by: NURSE PRACTITIONER

## 2024-03-23 PROCEDURE — 1200000000 HC SEMI PRIVATE

## 2024-03-23 PROCEDURE — 82803 BLOOD GASES ANY COMBINATION: CPT

## 2024-03-23 PROCEDURE — 94761 N-INVAS EAR/PLS OXIMETRY MLT: CPT

## 2024-03-23 PROCEDURE — 94660 CPAP INITIATION&MGMT: CPT

## 2024-03-23 PROCEDURE — 36415 COLL VENOUS BLD VENIPUNCTURE: CPT

## 2024-03-23 PROCEDURE — 80069 RENAL FUNCTION PANEL: CPT

## 2024-03-23 PROCEDURE — 83880 ASSAY OF NATRIURETIC PEPTIDE: CPT

## 2024-03-23 PROCEDURE — 71045 X-RAY EXAM CHEST 1 VIEW: CPT

## 2024-03-23 RX ORDER — POTASSIUM CHLORIDE 20 MEQ/1
40 TABLET, EXTENDED RELEASE ORAL DAILY
Status: DISCONTINUED | OUTPATIENT
Start: 2024-03-24 | End: 2024-03-26 | Stop reason: HOSPADM

## 2024-03-23 RX ADMIN — IPRATROPIUM BROMIDE AND ALBUTEROL SULFATE 1 DOSE: 2.5; .5 SOLUTION RESPIRATORY (INHALATION) at 21:21

## 2024-03-23 RX ADMIN — CLONIDINE HYDROCHLORIDE 0.1 MG: 0.1 TABLET ORAL at 21:00

## 2024-03-23 RX ADMIN — ISOSORBIDE MONONITRATE 120 MG: 60 TABLET, EXTENDED RELEASE ORAL at 09:02

## 2024-03-23 RX ADMIN — IPRATROPIUM BROMIDE AND ALBUTEROL SULFATE 1 DOSE: 2.5; .5 SOLUTION RESPIRATORY (INHALATION) at 07:46

## 2024-03-23 RX ADMIN — MOMETASONE FUROATE AND FORMOTEROL FUMARATE DIHYDRATE 2 PUFF: 100; 5 AEROSOL RESPIRATORY (INHALATION) at 07:46

## 2024-03-23 RX ADMIN — HYDRALAZINE HYDROCHLORIDE 50 MG: 50 TABLET ORAL at 21:00

## 2024-03-23 RX ADMIN — CLONIDINE HYDROCHLORIDE 0.1 MG: 0.1 TABLET ORAL at 13:32

## 2024-03-23 RX ADMIN — DILTIAZEM HYDROCHLORIDE 120 MG: 120 CAPSULE, EXTENDED RELEASE ORAL at 21:00

## 2024-03-23 RX ADMIN — TORSEMIDE 20 MG: 20 TABLET ORAL at 21:00

## 2024-03-23 RX ADMIN — Medication 10 ML: at 09:03

## 2024-03-23 RX ADMIN — ENOXAPARIN SODIUM 30 MG: 100 INJECTION SUBCUTANEOUS at 09:03

## 2024-03-23 RX ADMIN — HYDRALAZINE HYDROCHLORIDE 50 MG: 50 TABLET ORAL at 04:23

## 2024-03-23 RX ADMIN — TORSEMIDE 20 MG: 20 TABLET ORAL at 13:32

## 2024-03-23 RX ADMIN — DILTIAZEM HYDROCHLORIDE 120 MG: 120 CAPSULE, EXTENDED RELEASE ORAL at 09:03

## 2024-03-23 RX ADMIN — TORSEMIDE 20 MG: 20 TABLET ORAL at 09:02

## 2024-03-23 RX ADMIN — POTASSIUM CHLORIDE 40 MEQ: 1500 TABLET, EXTENDED RELEASE ORAL at 09:02

## 2024-03-23 RX ADMIN — HYDRALAZINE HYDROCHLORIDE 50 MG: 50 TABLET ORAL at 13:32

## 2024-03-23 RX ADMIN — IPRATROPIUM BROMIDE AND ALBUTEROL SULFATE 1 DOSE: 2.5; .5 SOLUTION RESPIRATORY (INHALATION) at 12:19

## 2024-03-23 RX ADMIN — CLONIDINE HYDROCHLORIDE 0.1 MG: 0.1 TABLET ORAL at 09:03

## 2024-03-23 RX ADMIN — MOMETASONE FUROATE AND FORMOTEROL FUMARATE DIHYDRATE 2 PUFF: 100; 5 AEROSOL RESPIRATORY (INHALATION) at 21:21

## 2024-03-23 ASSESSMENT — PAIN SCALES - GENERAL
PAINLEVEL_OUTOF10: 0

## 2024-03-24 LAB
ALBUMIN SERPL-MCNC: 3.4 G/DL (ref 3.4–5)
ANION GAP SERPL CALCULATED.3IONS-SCNC: 16 MMOL/L (ref 3–16)
BACTERIA SPEC RESP CULT: NORMAL
BASOPHILS # BLD: 0.1 K/UL (ref 0–0.2)
BASOPHILS NFR BLD: 0.5 %
BUN SERPL-MCNC: 53 MG/DL (ref 7–20)
CALCIUM SERPL-MCNC: 8.9 MG/DL (ref 8.3–10.6)
CHLORIDE SERPL-SCNC: 92 MMOL/L (ref 99–110)
CO2 SERPL-SCNC: 32 MMOL/L (ref 21–32)
CREAT SERPL-MCNC: 1.9 MG/DL (ref 0.6–1.2)
DEPRECATED RDW RBC AUTO: 19 % (ref 12.4–15.4)
EOSINOPHIL # BLD: 0.4 K/UL (ref 0–0.6)
EOSINOPHIL NFR BLD: 3.7 %
GFR SERPLBLD CREATININE-BSD FMLA CKD-EPI: 27 ML/MIN/{1.73_M2}
GLUCOSE SERPL-MCNC: 123 MG/DL (ref 70–99)
GRAM STN SPEC: NORMAL
HCT VFR BLD AUTO: 25 % (ref 36–48)
HGB BLD-MCNC: 8.4 G/DL (ref 12–16)
LYMPHOCYTES # BLD: 1 K/UL (ref 1–5.1)
LYMPHOCYTES NFR BLD: 8.9 %
MAGNESIUM SERPL-MCNC: 1.9 MG/DL (ref 1.8–2.4)
MCH RBC QN AUTO: 29.5 PG (ref 26–34)
MCHC RBC AUTO-ENTMCNC: 33.7 G/DL (ref 31–36)
MCV RBC AUTO: 87.7 FL (ref 80–100)
MONOCYTES # BLD: 0.8 K/UL (ref 0–1.3)
MONOCYTES NFR BLD: 7.2 %
NEUTROPHILS # BLD: 8.6 K/UL (ref 1.7–7.7)
NEUTROPHILS NFR BLD: 79.7 %
PHOSPHATE SERPL-MCNC: 3.9 MG/DL (ref 2.5–4.9)
PLATELET # BLD AUTO: 163 K/UL (ref 135–450)
PMV BLD AUTO: 9.1 FL (ref 5–10.5)
POTASSIUM SERPL-SCNC: 3.4 MMOL/L (ref 3.5–5.1)
POTASSIUM SERPL-SCNC: 3.4 MMOL/L (ref 3.5–5.1)
RBC # BLD AUTO: 2.85 M/UL (ref 4–5.2)
SODIUM SERPL-SCNC: 140 MMOL/L (ref 136–145)
WBC # BLD AUTO: 10.8 K/UL (ref 4–11)

## 2024-03-24 PROCEDURE — 94660 CPAP INITIATION&MGMT: CPT

## 2024-03-24 PROCEDURE — 6370000000 HC RX 637 (ALT 250 FOR IP): Performed by: INTERNAL MEDICINE

## 2024-03-24 PROCEDURE — 2700000000 HC OXYGEN THERAPY PER DAY

## 2024-03-24 PROCEDURE — 85025 COMPLETE CBC W/AUTO DIFF WBC: CPT

## 2024-03-24 PROCEDURE — 6360000002 HC RX W HCPCS: Performed by: HOSPITALIST

## 2024-03-24 PROCEDURE — 6370000000 HC RX 637 (ALT 250 FOR IP): Performed by: NURSE PRACTITIONER

## 2024-03-24 PROCEDURE — 80069 RENAL FUNCTION PANEL: CPT

## 2024-03-24 PROCEDURE — 83735 ASSAY OF MAGNESIUM: CPT

## 2024-03-24 PROCEDURE — 94761 N-INVAS EAR/PLS OXIMETRY MLT: CPT

## 2024-03-24 PROCEDURE — 94640 AIRWAY INHALATION TREATMENT: CPT

## 2024-03-24 PROCEDURE — 36415 COLL VENOUS BLD VENIPUNCTURE: CPT

## 2024-03-24 PROCEDURE — 1200000000 HC SEMI PRIVATE

## 2024-03-24 PROCEDURE — 2580000003 HC RX 258: Performed by: HOSPITALIST

## 2024-03-24 RX ORDER — TORSEMIDE 20 MG/1
20 TABLET ORAL 2 TIMES DAILY
Status: DISCONTINUED | OUTPATIENT
Start: 2024-03-24 | End: 2024-03-24

## 2024-03-24 RX ORDER — TORSEMIDE 20 MG/1
20 TABLET ORAL 2 TIMES DAILY
Status: DISCONTINUED | OUTPATIENT
Start: 2024-03-24 | End: 2024-03-26 | Stop reason: HOSPADM

## 2024-03-24 RX ADMIN — TORSEMIDE 20 MG: 20 TABLET ORAL at 16:44

## 2024-03-24 RX ADMIN — TORSEMIDE 20 MG: 20 TABLET ORAL at 13:59

## 2024-03-24 RX ADMIN — DILTIAZEM HYDROCHLORIDE 120 MG: 120 CAPSULE, EXTENDED RELEASE ORAL at 22:04

## 2024-03-24 RX ADMIN — MOMETASONE FUROATE AND FORMOTEROL FUMARATE DIHYDRATE 2 PUFF: 100; 5 AEROSOL RESPIRATORY (INHALATION) at 07:22

## 2024-03-24 RX ADMIN — IPRATROPIUM BROMIDE AND ALBUTEROL SULFATE 1 DOSE: 2.5; .5 SOLUTION RESPIRATORY (INHALATION) at 14:28

## 2024-03-24 RX ADMIN — Medication 10 ML: at 21:08

## 2024-03-24 RX ADMIN — ISOSORBIDE MONONITRATE 120 MG: 60 TABLET, EXTENDED RELEASE ORAL at 08:21

## 2024-03-24 RX ADMIN — CLONIDINE HYDROCHLORIDE 0.1 MG: 0.1 TABLET ORAL at 22:04

## 2024-03-24 RX ADMIN — TORSEMIDE 20 MG: 20 TABLET ORAL at 08:21

## 2024-03-24 RX ADMIN — ENOXAPARIN SODIUM 30 MG: 100 INJECTION SUBCUTANEOUS at 08:21

## 2024-03-24 RX ADMIN — MOMETASONE FUROATE AND FORMOTEROL FUMARATE DIHYDRATE 2 PUFF: 100; 5 AEROSOL RESPIRATORY (INHALATION) at 20:21

## 2024-03-24 RX ADMIN — HYDRALAZINE HYDROCHLORIDE 50 MG: 50 TABLET ORAL at 13:59

## 2024-03-24 RX ADMIN — POTASSIUM CHLORIDE 40 MEQ: 1500 TABLET, EXTENDED RELEASE ORAL at 08:21

## 2024-03-24 RX ADMIN — IPRATROPIUM BROMIDE AND ALBUTEROL SULFATE 1 DOSE: 2.5; .5 SOLUTION RESPIRATORY (INHALATION) at 20:20

## 2024-03-24 RX ADMIN — HYDRALAZINE HYDROCHLORIDE 50 MG: 50 TABLET ORAL at 04:29

## 2024-03-24 RX ADMIN — IPRATROPIUM BROMIDE AND ALBUTEROL SULFATE 1 DOSE: 2.5; .5 SOLUTION RESPIRATORY (INHALATION) at 07:22

## 2024-03-24 RX ADMIN — DILTIAZEM HYDROCHLORIDE 120 MG: 120 CAPSULE, EXTENDED RELEASE ORAL at 08:21

## 2024-03-24 RX ADMIN — CLONIDINE HYDROCHLORIDE 0.1 MG: 0.1 TABLET ORAL at 13:59

## 2024-03-24 RX ADMIN — HYDRALAZINE HYDROCHLORIDE 50 MG: 50 TABLET ORAL at 22:04

## 2024-03-24 RX ADMIN — Medication 10 ML: at 08:21

## 2024-03-24 RX ADMIN — CLONIDINE HYDROCHLORIDE 0.1 MG: 0.1 TABLET ORAL at 08:21

## 2024-03-24 ASSESSMENT — PAIN SCALES - GENERAL: PAINLEVEL_OUTOF10: 0

## 2024-03-25 LAB
ALBUMIN SERPL-MCNC: 3.4 G/DL (ref 3.4–5)
ANION GAP SERPL CALCULATED.3IONS-SCNC: 13 MMOL/L (ref 3–16)
ANION GAP SERPL CALCULATED.3IONS-SCNC: 9 MMOL/L (ref 3–16)
BASOPHILS # BLD: 0.1 K/UL (ref 0–0.2)
BASOPHILS NFR BLD: 0.6 %
BUN SERPL-MCNC: 47 MG/DL (ref 7–20)
BUN SERPL-MCNC: 48 MG/DL (ref 7–20)
CALCIUM SERPL-MCNC: 8.7 MG/DL (ref 8.3–10.6)
CALCIUM SERPL-MCNC: 8.9 MG/DL (ref 8.3–10.6)
CHLORIDE SERPL-SCNC: 91 MMOL/L (ref 99–110)
CHLORIDE SERPL-SCNC: 92 MMOL/L (ref 99–110)
CO2 SERPL-SCNC: 30 MMOL/L (ref 21–32)
CO2 SERPL-SCNC: 33 MMOL/L (ref 21–32)
CREAT SERPL-MCNC: 1.5 MG/DL (ref 0.6–1.2)
CREAT SERPL-MCNC: 1.5 MG/DL (ref 0.6–1.2)
DEPRECATED RDW RBC AUTO: 18.6 % (ref 12.4–15.4)
EOSINOPHIL # BLD: 0.3 K/UL (ref 0–0.6)
EOSINOPHIL NFR BLD: 2.9 %
GFR SERPLBLD CREATININE-BSD FMLA CKD-EPI: 35 ML/MIN/{1.73_M2}
GFR SERPLBLD CREATININE-BSD FMLA CKD-EPI: 35 ML/MIN/{1.73_M2}
GLUCOSE SERPL-MCNC: 112 MG/DL (ref 70–99)
GLUCOSE SERPL-MCNC: 139 MG/DL (ref 70–99)
HCT VFR BLD AUTO: 27.6 % (ref 36–48)
HGB BLD-MCNC: 9.2 G/DL (ref 12–16)
LYMPHOCYTES # BLD: 0.9 K/UL (ref 1–5.1)
LYMPHOCYTES NFR BLD: 8.5 %
MAGNESIUM SERPL-MCNC: 1.6 MG/DL (ref 1.8–2.4)
MAGNESIUM SERPL-MCNC: 1.7 MG/DL (ref 1.8–2.4)
MCH RBC QN AUTO: 29.2 PG (ref 26–34)
MCHC RBC AUTO-ENTMCNC: 33.5 G/DL (ref 31–36)
MCV RBC AUTO: 87.2 FL (ref 80–100)
MONOCYTES # BLD: 0.8 K/UL (ref 0–1.3)
MONOCYTES NFR BLD: 7 %
NEUTROPHILS # BLD: 8.8 K/UL (ref 1.7–7.7)
NEUTROPHILS NFR BLD: 81 %
PHOSPHATE SERPL-MCNC: 3.6 MG/DL (ref 2.5–4.9)
PLATELET # BLD AUTO: 178 K/UL (ref 135–450)
PMV BLD AUTO: 9.3 FL (ref 5–10.5)
POTASSIUM SERPL-SCNC: 2.8 MMOL/L (ref 3.5–5.1)
POTASSIUM SERPL-SCNC: 2.8 MMOL/L (ref 3.5–5.1)
POTASSIUM SERPL-SCNC: 3.2 MMOL/L (ref 3.5–5.1)
POTASSIUM SERPL-SCNC: 3.9 MMOL/L (ref 3.5–5.1)
RBC # BLD AUTO: 3.16 M/UL (ref 4–5.2)
SODIUM SERPL-SCNC: 134 MMOL/L (ref 136–145)
SODIUM SERPL-SCNC: 134 MMOL/L (ref 136–145)
WBC # BLD AUTO: 10.8 K/UL (ref 4–11)

## 2024-03-25 PROCEDURE — 6370000000 HC RX 637 (ALT 250 FOR IP): Performed by: INTERNAL MEDICINE

## 2024-03-25 PROCEDURE — 94640 AIRWAY INHALATION TREATMENT: CPT

## 2024-03-25 PROCEDURE — 2580000003 HC RX 258: Performed by: FAMILY MEDICINE

## 2024-03-25 PROCEDURE — 1200000000 HC SEMI PRIVATE

## 2024-03-25 PROCEDURE — 6370000000 HC RX 637 (ALT 250 FOR IP): Performed by: REGISTERED NURSE

## 2024-03-25 PROCEDURE — 6370000000 HC RX 637 (ALT 250 FOR IP): Performed by: HOSPITALIST

## 2024-03-25 PROCEDURE — 85025 COMPLETE CBC W/AUTO DIFF WBC: CPT

## 2024-03-25 PROCEDURE — 94761 N-INVAS EAR/PLS OXIMETRY MLT: CPT

## 2024-03-25 PROCEDURE — 97530 THERAPEUTIC ACTIVITIES: CPT

## 2024-03-25 PROCEDURE — 6370000000 HC RX 637 (ALT 250 FOR IP): Performed by: NURSE PRACTITIONER

## 2024-03-25 PROCEDURE — 6360000002 HC RX W HCPCS: Performed by: HOSPITALIST

## 2024-03-25 PROCEDURE — 84132 ASSAY OF SERUM POTASSIUM: CPT

## 2024-03-25 PROCEDURE — 94660 CPAP INITIATION&MGMT: CPT

## 2024-03-25 PROCEDURE — 2700000000 HC OXYGEN THERAPY PER DAY

## 2024-03-25 PROCEDURE — 2580000003 HC RX 258: Performed by: HOSPITALIST

## 2024-03-25 PROCEDURE — 97535 SELF CARE MNGMENT TRAINING: CPT

## 2024-03-25 PROCEDURE — 36415 COLL VENOUS BLD VENIPUNCTURE: CPT

## 2024-03-25 PROCEDURE — 6360000002 HC RX W HCPCS: Performed by: INTERNAL MEDICINE

## 2024-03-25 PROCEDURE — 80069 RENAL FUNCTION PANEL: CPT

## 2024-03-25 PROCEDURE — 6360000002 HC RX W HCPCS: Performed by: FAMILY MEDICINE

## 2024-03-25 PROCEDURE — 6370000000 HC RX 637 (ALT 250 FOR IP): Performed by: FAMILY MEDICINE

## 2024-03-25 PROCEDURE — 83735 ASSAY OF MAGNESIUM: CPT

## 2024-03-25 RX ORDER — SODIUM CHLORIDE 9 MG/ML
25 INJECTION, SOLUTION INTRAVENOUS PRN
Status: DISCONTINUED | OUTPATIENT
Start: 2024-03-25 | End: 2024-03-26 | Stop reason: HOSPADM

## 2024-03-25 RX ORDER — SODIUM CHLORIDE 0.9 % (FLUSH) 0.9 %
5-40 SYRINGE (ML) INJECTION EVERY 12 HOURS SCHEDULED
Status: DISCONTINUED | OUTPATIENT
Start: 2024-03-25 | End: 2024-03-26 | Stop reason: HOSPADM

## 2024-03-25 RX ORDER — LIDOCAINE HYDROCHLORIDE 10 MG/ML
5 INJECTION, SOLUTION EPIDURAL; INFILTRATION; INTRACAUDAL; PERINEURAL ONCE
Status: DISCONTINUED | OUTPATIENT
Start: 2024-03-25 | End: 2024-03-26 | Stop reason: HOSPADM

## 2024-03-25 RX ORDER — SODIUM CHLORIDE 9 MG/ML
25 INJECTION, SOLUTION INTRAVENOUS PRN
Status: DISCONTINUED | OUTPATIENT
Start: 2024-03-25 | End: 2024-03-25 | Stop reason: SDUPTHER

## 2024-03-25 RX ORDER — SODIUM CHLORIDE 0.9 % (FLUSH) 0.9 %
5-40 SYRINGE (ML) INJECTION PRN
Status: DISCONTINUED | OUTPATIENT
Start: 2024-03-25 | End: 2024-03-26 | Stop reason: HOSPADM

## 2024-03-25 RX ORDER — POTASSIUM CHLORIDE 7.45 MG/ML
10 INJECTION INTRAVENOUS ONCE
Status: COMPLETED | OUTPATIENT
Start: 2024-03-25 | End: 2024-03-25

## 2024-03-25 RX ORDER — POTASSIUM CHLORIDE 20 MEQ/1
20 TABLET, EXTENDED RELEASE ORAL DAILY
Status: ON HOLD | COMMUNITY
End: 2024-03-26 | Stop reason: HOSPADM

## 2024-03-25 RX ORDER — POTASSIUM CHLORIDE 7.45 MG/ML
10 INJECTION INTRAVENOUS
Status: ACTIVE | OUTPATIENT
Start: 2024-03-25 | End: 2024-03-25

## 2024-03-25 RX ORDER — FERROUS SULFATE 325(65) MG
325 TABLET ORAL 2 TIMES DAILY
COMMUNITY

## 2024-03-25 RX ORDER — POTASSIUM CHLORIDE 7.45 MG/ML
10 INJECTION INTRAVENOUS
Status: COMPLETED | OUTPATIENT
Start: 2024-03-25 | End: 2024-03-25

## 2024-03-25 RX ORDER — POTASSIUM CHLORIDE 20 MEQ/1
20 TABLET, EXTENDED RELEASE ORAL ONCE
Status: COMPLETED | OUTPATIENT
Start: 2024-03-25 | End: 2024-03-25

## 2024-03-25 RX ADMIN — POTASSIUM CHLORIDE 10 MEQ: 7.46 INJECTION, SOLUTION INTRAVENOUS at 17:36

## 2024-03-25 RX ADMIN — HYDRALAZINE HYDROCHLORIDE 50 MG: 50 TABLET ORAL at 05:05

## 2024-03-25 RX ADMIN — HYDRALAZINE HYDROCHLORIDE 50 MG: 50 TABLET ORAL at 21:42

## 2024-03-25 RX ADMIN — POTASSIUM BICARBONATE 40 MEQ: 782 TABLET, EFFERVESCENT ORAL at 11:10

## 2024-03-25 RX ADMIN — CLONIDINE HYDROCHLORIDE 0.1 MG: 0.1 TABLET ORAL at 19:57

## 2024-03-25 RX ADMIN — HYDROXYZINE PAMOATE 25 MG: 25 CAPSULE ORAL at 21:43

## 2024-03-25 RX ADMIN — ENOXAPARIN SODIUM 30 MG: 100 INJECTION SUBCUTANEOUS at 08:15

## 2024-03-25 RX ADMIN — TORSEMIDE 20 MG: 20 TABLET ORAL at 17:36

## 2024-03-25 RX ADMIN — DILTIAZEM HYDROCHLORIDE 120 MG: 120 CAPSULE, EXTENDED RELEASE ORAL at 19:57

## 2024-03-25 RX ADMIN — SODIUM CHLORIDE 1000 ML: 9 INJECTION, SOLUTION INTRAVENOUS at 05:13

## 2024-03-25 RX ADMIN — POTASSIUM CHLORIDE 20 MEQ: 1500 TABLET, EXTENDED RELEASE ORAL at 05:05

## 2024-03-25 RX ADMIN — MOMETASONE FUROATE AND FORMOTEROL FUMARATE DIHYDRATE 2 PUFF: 100; 5 AEROSOL RESPIRATORY (INHALATION) at 07:27

## 2024-03-25 RX ADMIN — ACETAMINOPHEN 325MG 650 MG: 325 TABLET ORAL at 21:43

## 2024-03-25 RX ADMIN — DILTIAZEM HYDROCHLORIDE 120 MG: 120 CAPSULE, EXTENDED RELEASE ORAL at 08:14

## 2024-03-25 RX ADMIN — POTASSIUM CHLORIDE 40 MEQ: 1500 TABLET, EXTENDED RELEASE ORAL at 08:15

## 2024-03-25 RX ADMIN — ALBUTEROL SULFATE 2.5 MG: 2.5 SOLUTION RESPIRATORY (INHALATION) at 03:28

## 2024-03-25 RX ADMIN — ISOSORBIDE MONONITRATE 120 MG: 60 TABLET, EXTENDED RELEASE ORAL at 09:29

## 2024-03-25 RX ADMIN — HYDROXYZINE PAMOATE 25 MG: 25 CAPSULE ORAL at 02:37

## 2024-03-25 RX ADMIN — CLONIDINE HYDROCHLORIDE 0.1 MG: 0.1 TABLET ORAL at 08:15

## 2024-03-25 RX ADMIN — POTASSIUM CHLORIDE 10 MEQ: 7.46 INJECTION, SOLUTION INTRAVENOUS at 18:47

## 2024-03-25 RX ADMIN — Medication 10 ML: at 19:58

## 2024-03-25 RX ADMIN — TORSEMIDE 20 MG: 20 TABLET ORAL at 08:15

## 2024-03-25 RX ADMIN — POTASSIUM CHLORIDE 10 MEQ: 7.46 INJECTION, SOLUTION INTRAVENOUS at 05:00

## 2024-03-25 RX ADMIN — POTASSIUM CHLORIDE 10 MEQ: 7.46 INJECTION, SOLUTION INTRAVENOUS at 15:26

## 2024-03-25 RX ADMIN — CLONIDINE HYDROCHLORIDE 0.1 MG: 0.1 TABLET ORAL at 13:51

## 2024-03-25 RX ADMIN — POTASSIUM CHLORIDE 10 MEQ: 7.46 INJECTION, SOLUTION INTRAVENOUS at 16:32

## 2024-03-25 RX ADMIN — IPRATROPIUM BROMIDE AND ALBUTEROL SULFATE 1 DOSE: 2.5; .5 SOLUTION RESPIRATORY (INHALATION) at 07:26

## 2024-03-25 RX ADMIN — HYDRALAZINE HYDROCHLORIDE 50 MG: 50 TABLET ORAL at 15:23

## 2024-03-25 ASSESSMENT — PAIN SCALES - GENERAL: PAINLEVEL_OUTOF10: 3

## 2024-03-25 ASSESSMENT — PAIN - FUNCTIONAL ASSESSMENT: PAIN_FUNCTIONAL_ASSESSMENT: ACTIVITIES ARE NOT PREVENTED

## 2024-03-25 ASSESSMENT — PAIN DESCRIPTION - LOCATION: LOCATION: HEAD

## 2024-03-25 ASSESSMENT — PAIN DESCRIPTION - PAIN TYPE: TYPE: ACUTE PAIN

## 2024-03-25 ASSESSMENT — PAIN DESCRIPTION - DESCRIPTORS: DESCRIPTORS: ACHING

## 2024-03-25 NOTE — CARE COORDINATION
Spoke with patient and spouse present in room regarding snf recommendation; list provided. Patient reported she has been to [facility] in recent past. They are agreeable to referral to [facility].   Sw explained snf medicare coverage of 100 days. Spouse reported he is unsure of how many medicare days patient has used but it has been quite a few.   Sent referral to [facility] and requested clarification of snf days used.     Electronically signed by CONNOR Mitchell LISW, Case Management on 3/25/2024 at 10:55 AM  [employer] [phone]    1:35 PM  Received a call from Christopher at [facility]--they can accept patient.  He reported patient was at [facility] from   9/19/23 to 11/2/23 and then again from   11/15/23 to 12/15/23. Patient has approximately 30 snf medicare days left.   Medicare requires patient to stay out of the acute care hospital and snf level of care settings for 60 consecutive days to start a new snf benefit period. Patient had a hospital admission on 2/13/24; therefore, she is still in the same snf benefit period.     If patient stays beyond 30 days, she would be private pay at a cost of $369.00/day plus level of care charge of up to $85.00/day.    Electronically signed by CONNOR Mitchell LISW, Case Management on 3/25/2024 at 1:41 PM  [employer] [phone]    2:26 PM  Received call from Melissa at [facility]. She clarified that patient has actually exhausted all of her medicare snf days as she had a stay at [facility] prior to [facility].   If patient goes to a snf, she will be private pay.    Electronically signed by CONNOR Mitchell LISW, Case Management on 3/25/2024 at 2:28 PM  [employer] [phone]

## 2024-03-26 VITALS
RESPIRATION RATE: 15 BRPM | DIASTOLIC BLOOD PRESSURE: 68 MMHG | WEIGHT: 224.87 LBS | HEIGHT: 64 IN | HEART RATE: 68 BPM | TEMPERATURE: 97.7 F | OXYGEN SATURATION: 96 % | SYSTOLIC BLOOD PRESSURE: 147 MMHG | BODY MASS INDEX: 38.39 KG/M2

## 2024-03-26 LAB
ALBUMIN SERPL-MCNC: 3.3 G/DL (ref 3.4–5)
ANION GAP SERPL CALCULATED.3IONS-SCNC: 11 MMOL/L (ref 3–16)
BASOPHILS # BLD: 0.1 K/UL (ref 0–0.2)
BASOPHILS NFR BLD: 0.5 %
BUN SERPL-MCNC: 46 MG/DL (ref 7–20)
CALCIUM SERPL-MCNC: 8.7 MG/DL (ref 8.3–10.6)
CHLORIDE SERPL-SCNC: 95 MMOL/L (ref 99–110)
CO2 SERPL-SCNC: 32 MMOL/L (ref 21–32)
CREAT SERPL-MCNC: 1.7 MG/DL (ref 0.6–1.2)
DEPRECATED RDW RBC AUTO: 18.4 % (ref 12.4–15.4)
EOSINOPHIL # BLD: 0.5 K/UL (ref 0–0.6)
EOSINOPHIL NFR BLD: 4.9 %
GFR SERPLBLD CREATININE-BSD FMLA CKD-EPI: 30 ML/MIN/{1.73_M2}
GLUCOSE SERPL-MCNC: 107 MG/DL (ref 70–99)
HCT VFR BLD AUTO: 26.8 % (ref 36–48)
HGB BLD-MCNC: 9.1 G/DL (ref 12–16)
LYMPHOCYTES # BLD: 1 K/UL (ref 1–5.1)
LYMPHOCYTES NFR BLD: 9.9 %
MAGNESIUM SERPL-MCNC: 1.7 MG/DL (ref 1.8–2.4)
MCH RBC QN AUTO: 29.4 PG (ref 26–34)
MCHC RBC AUTO-ENTMCNC: 33.9 G/DL (ref 31–36)
MCV RBC AUTO: 86.7 FL (ref 80–100)
MONOCYTES # BLD: 0.7 K/UL (ref 0–1.3)
MONOCYTES NFR BLD: 7.1 %
NEUTROPHILS # BLD: 8.1 K/UL (ref 1.7–7.7)
NEUTROPHILS NFR BLD: 77.6 %
PHOSPHATE SERPL-MCNC: 3.4 MG/DL (ref 2.5–4.9)
PLATELET # BLD AUTO: 182 K/UL (ref 135–450)
PMV BLD AUTO: 9.4 FL (ref 5–10.5)
POTASSIUM SERPL-SCNC: 3.3 MMOL/L (ref 3.5–5.1)
POTASSIUM SERPL-SCNC: 3.3 MMOL/L (ref 3.5–5.1)
RBC # BLD AUTO: 3.09 M/UL (ref 4–5.2)
SODIUM SERPL-SCNC: 138 MMOL/L (ref 136–145)
WBC # BLD AUTO: 10.4 K/UL (ref 4–11)

## 2024-03-26 PROCEDURE — 94761 N-INVAS EAR/PLS OXIMETRY MLT: CPT

## 2024-03-26 PROCEDURE — 94640 AIRWAY INHALATION TREATMENT: CPT

## 2024-03-26 PROCEDURE — 6370000000 HC RX 637 (ALT 250 FOR IP): Performed by: INTERNAL MEDICINE

## 2024-03-26 PROCEDURE — 36415 COLL VENOUS BLD VENIPUNCTURE: CPT

## 2024-03-26 PROCEDURE — 6370000000 HC RX 637 (ALT 250 FOR IP): Performed by: NURSE PRACTITIONER

## 2024-03-26 PROCEDURE — 85025 COMPLETE CBC W/AUTO DIFF WBC: CPT

## 2024-03-26 PROCEDURE — 83735 ASSAY OF MAGNESIUM: CPT

## 2024-03-26 PROCEDURE — 2700000000 HC OXYGEN THERAPY PER DAY

## 2024-03-26 PROCEDURE — 6360000002 HC RX W HCPCS: Performed by: HOSPITALIST

## 2024-03-26 PROCEDURE — 2580000003 HC RX 258: Performed by: FAMILY MEDICINE

## 2024-03-26 PROCEDURE — 80069 RENAL FUNCTION PANEL: CPT

## 2024-03-26 RX ORDER — TORSEMIDE 20 MG/1
20 TABLET ORAL 2 TIMES DAILY
Qty: 30 TABLET | Refills: 0 | Status: SHIPPED | OUTPATIENT
Start: 2024-03-26

## 2024-03-26 RX ORDER — POTASSIUM CHLORIDE 20 MEQ/1
40 TABLET, EXTENDED RELEASE ORAL PRN
Status: DISCONTINUED | OUTPATIENT
Start: 2024-03-26 | End: 2024-03-26 | Stop reason: HOSPADM

## 2024-03-26 RX ORDER — POTASSIUM CHLORIDE 7.45 MG/ML
10 INJECTION INTRAVENOUS PRN
Status: DISCONTINUED | OUTPATIENT
Start: 2024-03-26 | End: 2024-03-26 | Stop reason: HOSPADM

## 2024-03-26 RX ORDER — POTASSIUM CHLORIDE 20 MEQ/1
40 TABLET, EXTENDED RELEASE ORAL 2 TIMES DAILY
Qty: 60 TABLET | Refills: 3 | Status: SHIPPED | OUTPATIENT
Start: 2024-03-26

## 2024-03-26 RX ADMIN — CLONIDINE HYDROCHLORIDE 0.1 MG: 0.1 TABLET ORAL at 16:36

## 2024-03-26 RX ADMIN — DILTIAZEM HYDROCHLORIDE 120 MG: 120 CAPSULE, EXTENDED RELEASE ORAL at 10:00

## 2024-03-26 RX ADMIN — HYDRALAZINE HYDROCHLORIDE 50 MG: 50 TABLET ORAL at 05:32

## 2024-03-26 RX ADMIN — HYDRALAZINE HYDROCHLORIDE 50 MG: 50 TABLET ORAL at 16:36

## 2024-03-26 RX ADMIN — TORSEMIDE 20 MG: 20 TABLET ORAL at 10:00

## 2024-03-26 RX ADMIN — ISOSORBIDE MONONITRATE 120 MG: 60 TABLET, EXTENDED RELEASE ORAL at 10:00

## 2024-03-26 RX ADMIN — TORSEMIDE 20 MG: 20 TABLET ORAL at 16:36

## 2024-03-26 RX ADMIN — CLONIDINE HYDROCHLORIDE 0.1 MG: 0.1 TABLET ORAL at 10:00

## 2024-03-26 RX ADMIN — POTASSIUM CHLORIDE 40 MEQ: 1500 TABLET, EXTENDED RELEASE ORAL at 10:00

## 2024-03-26 RX ADMIN — IPRATROPIUM BROMIDE AND ALBUTEROL SULFATE 1 DOSE: 2.5; .5 SOLUTION RESPIRATORY (INHALATION) at 12:53

## 2024-03-26 RX ADMIN — Medication 10 ML: at 10:00

## 2024-03-26 RX ADMIN — ENOXAPARIN SODIUM 30 MG: 100 INJECTION SUBCUTANEOUS at 10:00

## 2024-03-26 NOTE — CARE COORDINATION
Hospice consult noted  Met with patient  and pt at bedside  He would like for pt to return home at dc   He is agreeable to hospice meeting  Prefers [facility]  Referral faxed #[phone]  Notified Patricia #[phone]- she will contact family to arrange meeting time.  Electronically signed by Sera Reece on 3/26/2024 at 1:37 PM  #[phone]

## 2024-03-26 NOTE — NURSE NAVIGATOR
DC order noted. Hospice consult in place. They have met with hospice in the past. Per chart review in past, it states pt could be hospice appropriate. Presently pt has a cardiology hospital follow up appt in place on 4/1 with Dr Lopez and HF dc instructions are on AVS. Will continue to follow peripherally.

## 2024-03-26 NOTE — PLAN OF CARE
Problem: Discharge Planning  Goal: Discharge to home or other facility with appropriate resources  3/26/2024 0807 by Russell Ingram RN  Outcome: Progressing  Flowsheets (Taken 3/25/2024 1959 by Mirna Stuart RN)  Discharge to home or other facility with appropriate resources: Identify barriers to discharge with patient and caregiver  3/26/2024 0016 by Mirna Stuart RN  Outcome: Progressing  Flowsheets (Taken 3/25/2024 1959)  Discharge to home or other facility with appropriate resources: Identify barriers to discharge with patient and caregiver     Problem: Pain  Goal: Verbalizes/displays adequate comfort level or baseline comfort level  3/26/2024 0807 by Russell Ingram RN  Outcome: Progressing  Flowsheets (Taken 3/25/2024 1959 by Mirna Stuart RN)  Verbalizes/displays adequate comfort level or baseline comfort level:   Encourage patient to monitor pain and request assistance   Assess pain using appropriate pain scale   Administer analgesics based on type and severity of pain and evaluate response   Implement non-pharmacological measures as appropriate and evaluate response  3/26/2024 0016 by Mirna Stuart RN  Outcome: Progressing  Flowsheets (Taken 3/25/2024 1959)  Verbalizes/displays adequate comfort level or baseline comfort level:   Encourage patient to monitor pain and request assistance   Assess pain using appropriate pain scale   Administer analgesics based on type and severity of pain and evaluate response   Implement non-pharmacological measures as appropriate and evaluate response     Problem: Respiratory - Adult  Goal: Achieves optimal ventilation and oxygenation  3/26/2024 0807 by Russell Ingram RN  Outcome: Progressing  Flowsheets (Taken 3/25/2024 1959 by Mirna Stuart RN)  Achieves optimal ventilation and oxygenation:   Assess for changes in respiratory status   Assess for changes in mentation and behavior   Position to facilitate oxygenation and minimize respiratory 
  Problem: Discharge Planning  Goal: Discharge to home or other facility with appropriate resources  Outcome: Progressing  Flowsheets (Taken 3/21/2024 0800)  Discharge to home or other facility with appropriate resources: Identify barriers to discharge with patient and caregiver     Problem: Pain  Goal: Verbalizes/displays adequate comfort level or baseline comfort level  Outcome: Progressing  Flowsheets  Taken 3/21/2024 1256  Verbalizes/displays adequate comfort level or baseline comfort level: Encourage patient to monitor pain and request assistance  Taken 3/21/2024 0800  Verbalizes/displays adequate comfort level or baseline comfort level: Encourage patient to monitor pain and request assistance     Problem: Respiratory - Adult  Goal: Achieves optimal ventilation and oxygenation  Outcome: Progressing  Flowsheets (Taken 3/21/2024 0800)  Achieves optimal ventilation and oxygenation: Assess for changes in respiratory status     Problem: Cardiovascular - Adult  Goal: Maintains optimal cardiac output and hemodynamic stability  Outcome: Progressing  Flowsheets (Taken 3/21/2024 0800)  Maintains optimal cardiac output and hemodynamic stability: Monitor blood pressure and heart rate  Goal: Absence of cardiac dysrhythmias or at baseline  Outcome: Progressing  Flowsheets (Taken 3/21/2024 0800)  Absence of cardiac dysrhythmias or at baseline: Monitor cardiac rate and rhythm     Problem: Musculoskeletal - Adult  Goal: Return mobility to safest level of function  Outcome: Progressing  Flowsheets (Taken 3/21/2024 0800)  Return Mobility to Safest Level of Function: Assess patient stability and activity tolerance for standing, transferring and ambulating with or without assistive devices  Goal: Maintain proper alignment of affected body part  Outcome: Progressing  Flowsheets (Taken 3/21/2024 0800)  Maintain proper alignment of affected body part: Support and protect limb and body alignment per provider's orders  Goal: Return ADL 
  Problem: Discharge Planning  Goal: Discharge to home or other facility with appropriate resources  Outcome: Progressing  Flowsheets (Taken 3/25/2024 1959)  Discharge to home or other facility with appropriate resources: Identify barriers to discharge with patient and caregiver     Problem: Pain  Goal: Verbalizes/displays adequate comfort level or baseline comfort level  Outcome: Progressing  Flowsheets (Taken 3/25/2024 1959)  Verbalizes/displays adequate comfort level or baseline comfort level:   Encourage patient to monitor pain and request assistance   Assess pain using appropriate pain scale   Administer analgesics based on type and severity of pain and evaluate response   Implement non-pharmacological measures as appropriate and evaluate response     Problem: Respiratory - Adult  Goal: Achieves optimal ventilation and oxygenation  Outcome: Progressing  Flowsheets (Taken 3/25/2024 1959)  Achieves optimal ventilation and oxygenation:   Assess for changes in respiratory status   Assess for changes in mentation and behavior   Position to facilitate oxygenation and minimize respiratory effort   Oxygen supplementation based on oxygen saturation or arterial blood gases     Problem: Cardiovascular - Adult  Goal: Maintains optimal cardiac output and hemodynamic stability  Outcome: Progressing  Flowsheets (Taken 3/25/2024 1959)  Maintains optimal cardiac output and hemodynamic stability:   Monitor urine output and notify Licensed Independent Practitioner for values outside of normal range   Monitor blood pressure and heart rate   Assess for signs of decreased cardiac output  Goal: Absence of cardiac dysrhythmias or at baseline  Outcome: Progressing  Flowsheets (Taken 3/25/2024 1959)  Absence of cardiac dysrhythmias or at baseline: Monitor cardiac rate and rhythm     Problem: Musculoskeletal - Adult  Goal: Return mobility to safest level of function  Outcome: Progressing  Flowsheets (Taken 3/25/2024 1959)  Return 
  Problem: Discharge Planning  Goal: Discharge to home or other facility with appropriate resources  Recent Flowsheet Documentation  Taken 3/22/2024 2000 by Ann-Marie Wade RN  Discharge to home or other facility with appropriate resources:   Identify barriers to discharge with patient and caregiver   Identify discharge learning needs (meds, wound care, etc)   Arrange for needed discharge resources and transportation as appropriate   Arrange for interpreters to assist at discharge as needed   Refer to discharge planning if patient needs post-hospital services based on physician order or complex needs related to functional status, cognitive ability or social support system  3/22/2024 1415 by Ayana Mireles RN  Outcome: Progressing  Flowsheets (Taken 3/22/2024 0742)  Discharge to home or other facility with appropriate resources: Identify barriers to discharge with patient and caregiver     Problem: Pain  Goal: Verbalizes/displays adequate comfort level or baseline comfort level  Recent Flowsheet Documentation  Taken 3/22/2024 2000 by Ann-Marie Wade RN  Verbalizes/displays adequate comfort level or baseline comfort level:   Encourage patient to monitor pain and request assistance   Assess pain using appropriate pain scale   Administer analgesics based on type and severity of pain and evaluate response   Implement non-pharmacological measures as appropriate and evaluate response   Consider cultural and social influences on pain and pain management   Notify Licensed Independent Practitioner if interventions unsuccessful or patient reports new pain  Taken 3/22/2024 1600 by Ayana Mireles RN  Verbalizes/displays adequate comfort level or baseline comfort level: Encourage patient to monitor pain and request assistance  3/22/2024 1415 by Ayana Mireles, RN  Outcome: Progressing  Flowsheets  Taken 3/22/2024 1200  Verbalizes/displays adequate comfort level or baseline comfort level: Encourage patient to monitor 
Patient was not seen or examined. Admission orders were placed.  
Level of Function: Assess patient stability and activity tolerance for standing, transferring and ambulating with or without assistive devices  Goal: Maintain proper alignment of affected body part  Outcome: Progressing  Flowsheets (Taken 3/23/2024 2128)  Maintain proper alignment of affected body part: Support and protect limb and body alignment per provider's orders  Goal: Return ADL status to a safe level of function  Outcome: Progressing  Flowsheets (Taken 3/23/2024 2128)  Return ADL Status to a Safe Level of Function: Administer medication as ordered     Problem: Metabolic/Fluid and Electrolytes - Adult  Goal: Electrolytes maintained within normal limits  Outcome: Progressing  Flowsheets (Taken 3/23/2024 2128)  Electrolytes maintained within normal limits: Monitor labs and assess patient for signs and symptoms of electrolyte imbalances  Goal: Hemodynamic stability and optimal renal function maintained  Outcome: Progressing  Flowsheets (Taken 3/23/2024 2128)  Hemodynamic stability and optimal renal function maintained: Monitor labs and assess for signs and symptoms of volume excess or deficit  Goal: Glucose maintained within prescribed range  Outcome: Progressing  Flowsheets (Taken 3/23/2024 2128)  Glucose maintained within prescribed range: Monitor blood glucose as ordered     Problem: Hematologic - Adult  Goal: Maintains hematologic stability  Outcome: Progressing  Flowsheets (Taken 3/23/2024 2128)  Maintains hematologic stability: Assess for signs and symptoms of bleeding or hemorrhage     Problem: Safety - Adult  Goal: Free from fall injury  Outcome: Progressing  Flowsheets (Taken 3/24/2024 2329)  Free From Fall Injury: Instruct family/caregiver on patient safety     Problem: Skin/Tissue Integrity  Goal: Absence of new skin breakdown  Description: 1.  Monitor for areas of redness and/or skin breakdown  2.  Assess vascular access sites hourly  3.  Every 4-6 hours minimum:  Change oxygen saturation probe 
level or baseline comfort level: Encourage patient to monitor pain and request assistance  Taken 3/21/2024 0800  Verbalizes/displays adequate comfort level or baseline comfort level: Encourage patient to monitor pain and request assistance     Problem: Respiratory - Adult  Goal: Achieves optimal ventilation and oxygenation  3/21/2024 2059 by Whit Rojas RN  Outcome: Progressing  Flowsheets (Taken 3/21/2024 2000)  Achieves optimal ventilation and oxygenation:   Assess for changes in respiratory status   Assess for changes in mentation and behavior   Position to facilitate oxygenation and minimize respiratory effort   Oxygen supplementation based on oxygen saturation or arterial blood gases   Initiate smoking cessation protocol as indicated   Assess and instruct to report shortness of breath or any respiratory difficulty   Encourage broncho-pulmonary hygiene including cough, deep breathe, incentive spirometry   Assess the need for suctioning and aspirate as needed   Respiratory therapy support as indicated  3/21/2024 1514 by Ayana Mireles, RN  Outcome: Progressing  Flowsheets (Taken 3/21/2024 0800)  Achieves optimal ventilation and oxygenation: Assess for changes in respiratory status     Problem: Cardiovascular - Adult  Goal: Maintains optimal cardiac output and hemodynamic stability  3/21/2024 2059 by Whit Rojas RN  Outcome: Progressing  Flowsheets (Taken 3/21/2024 2000)  Maintains optimal cardiac output and hemodynamic stability:   Monitor blood pressure and heart rate   Monitor urine output and notify Licensed Independent Practitioner for values outside of normal range   Assess for signs of decreased cardiac output   Administer fluid and/or volume expanders as ordered   Administer vasoactive medications as ordered  3/21/2024 1514 by Ayana Mireles, RN  Outcome: Progressing  Flowsheets (Taken 3/21/2024 0800)  Maintains optimal cardiac output and hemodynamic stability: Monitor blood pressure and 
interaction with staff  Outcome: Progressing  Flowsheets (Taken 3/22/2024 0742)  Will report anxiety at manageable levels: Administer medication as ordered     Problem: Coping  Goal: Pt/Family able to verbalize concerns and demonstrate effective coping strategies  Description: INTERVENTIONS:  1. Assist patient/family to identify coping skills, available support systems and cultural and spiritual values  2. Provide emotional support, including active listening and acknowledgement of concerns of patient and caregivers  3. Reduce environmental stimuli, as able  4. Instruct patient/family in relaxation techniques, as appropriate  5. Assess for spiritual pain/suffering and initiate Spiritual Care, Psychosocial Clinical Specialist consults as needed  Outcome: Progressing  Flowsheets (Taken 3/22/2024 0742)  Patient/family able to verbalize anxieties, fears, and concerns, and demonstrate effective coping: Assist patient/family to identify coping skills, available support systems and cultural and spiritual values     Problem: Neurosensory - Adult  Goal: Achieves stable or improved neurological status  Outcome: Progressing  Flowsheets (Taken 3/22/2024 0742)  Achieves stable or improved neurological status: Assess for and report changes in neurological status  Goal: Achieves maximal functionality and self care  Outcome: Progressing  Flowsheets (Taken 3/22/2024 0742)  Achieves maximal functionality and self care: Monitor swallowing and airway patency with patient fatigue and changes in neurological status     Problem: Skin/Tissue Integrity - Adult  Goal: Skin integrity remains intact  Outcome: Progressing  Flowsheets (Taken 3/22/2024 0742)  Skin Integrity Remains Intact: Monitor for areas of redness and/or skin breakdown  Goal: Oral mucous membranes remain intact  Outcome: Progressing  Flowsheets (Taken 3/22/2024 0742)  Oral Mucous Membranes Remain Intact: Assess oral mucosa and hygiene practices     Problem: Gastrointestinal - 
Monitor swallowing and airway patency with patient fatigue and changes in neurological status  Taken 3/23/2024 2128 by Cristina Bishop RN  Achieves maximal functionality and self care: Monitor swallowing and airway patency with patient fatigue and changes in neurological status  3/23/2024 1950 by Cristina Bishop RN  Outcome: Progressing  Flowsheets (Taken 3/22/2024 2000 by Ann-Marie Wade RN)  Achieves maximal functionality and self care:   Monitor swallowing and airway patency with patient fatigue and changes in neurological status   Encourage and assist patient to increase activity and self care with guidance from physical therapy/occupational therapy   Encourage visually impaired, hearing impaired and aphasic patients to use assistive/communication devices     Problem: Skin/Tissue Integrity - Adult  Goal: Skin integrity remains intact  3/24/2024 0902 by Gracy Null RN  Outcome: Progressing  Flowsheets (Taken 3/23/2024 2128 by Dario, Cristina, RN)  Skin Integrity Remains Intact: Monitor for areas of redness and/or skin breakdown  3/23/2024 1950 by Cristina Bishop RN  Outcome: Progressing  Flowsheets (Taken 3/23/2024 1949)  Skin Integrity Remains Intact: Monitor for areas of redness and/or skin breakdown  Goal: Oral mucous membranes remain intact  3/24/2024 0902 by Gracy Null RN  Outcome: Progressing  Flowsheets (Taken 3/23/2024 2128 by Cristina Bishop RN)  Oral Mucous Membranes Remain Intact: Assess oral mucosa and hygiene practices  3/23/2024 1950 by Cristina Bishop RN  Outcome: Progressing  Flowsheets (Taken 3/23/2024 1949)  Oral Mucous Membranes Remain Intact: Assess oral mucosa and hygiene practices     Problem: Gastrointestinal - Adult  Goal: Minimal or absence of nausea and vomiting  3/24/2024 0902 by Gracy Null RN  Outcome: Progressing  Flowsheets (Taken 3/23/2024 2128 by Cristina Bishop RN)  Minimal or absence of nausea and vomiting: Administer IV fluids as 
ability to void and empty bladder   Monitor intake/output and perform bladder scan as needed   Place urinary catheter per Licensed Independent Practitioner order if needed   Discuss with Licensed Independent Practitioner  medications to alleviate retention as needed   Discuss catheterization for long term situations as appropriate     Problem: Infection - Adult  Goal: Absence of infection at discharge  Outcome: Progressing  Flowsheets (Taken 3/22/2024 2000 by Ann-Marie Wade RN)  Absence of infection at discharge:   Assess and monitor for signs and symptoms of infection   Monitor all insertion sites i.e., indwelling lines, tubes and drains   Monitor lab/diagnostic results   [place] appropriate cooling/warming therapies per order   Instruct and encourage patient and family to use good hand hygiene technique   Identify and instruct in appropriate isolation precautions for identified infection/condition   Administer medications as ordered  Goal: Absence of infection during hospitalization  Outcome: Progressing  Flowsheets (Taken 3/22/2024 2000 by Ann-Marie Wade RN)  Absence of infection during hospitalization:   Assess and monitor for signs and symptoms of infection   Monitor lab/diagnostic results   Monitor all insertion sites i.e., indwelling lines, tubes and drains   [place] appropriate cooling/warming therapies per order   Administer medications as ordered   Instruct and encourage patient and family to use good hand hygiene technique   Identify and instruct in appropriate isolation precautions for identified infection/condition     
Progressing  Flowsheets  Taken 3/24/2024 2329  Skin Integrity Remains Intact: Assess vascular access sites hourly  Taken 3/24/2024 2323  Skin Integrity Remains Intact:   Assess vascular access sites hourly   Monitor for areas of redness and/or skin breakdown  Goal: Oral mucous membranes remain intact  3/25/2024 0821 by Rere Myles RN  Outcome: Progressing  Flowsheets (Taken 3/25/2024 0821)  Oral Mucous Membranes Remain Intact: Assess oral mucosa and hygiene practices  3/24/2024 2331 by Cristina Bishop RN  Outcome: Progressing  Flowsheets  Taken 3/24/2024 2329  Oral Mucous Membranes Remain Intact: Assess oral mucosa and hygiene practices  Taken 3/24/2024 2323  Oral Mucous Membranes Remain Intact: Assess oral mucosa and hygiene practices     Problem: Gastrointestinal - Adult  Goal: Minimal or absence of nausea and vomiting  3/25/2024 0821 by Rere Myles RN  Outcome: Progressing  Flowsheets (Taken 3/25/2024 0821)  Minimal or absence of nausea and vomiting: Administer IV fluids as ordered to ensure adequate hydration  3/24/2024 2331 by Cristina Bishop RN  Outcome: Progressing  Flowsheets (Taken 3/23/2024 2128)  Minimal or absence of nausea and vomiting: Administer IV fluids as ordered to ensure adequate hydration  Goal: Maintains or returns to baseline bowel function  3/25/2024 0821 by Rere Myles RN  Outcome: Progressing  Flowsheets (Taken 3/25/2024 0821)  Maintains or returns to baseline bowel function:   Assess bowel function   Administer IV fluids as ordered to ensure adequate hydration   Encourage oral fluids to ensure adequate hydration   Administer ordered medications as needed   Encourage mobilization and activity   Nutrition consult to assist patient with appropriate food choices  3/24/2024 2331 by Cristina Bishop RN  Outcome: Progressing  Flowsheets (Taken 3/23/2024 2128)  Maintains or returns to baseline bowel function: Assess bowel function     Problem: Genitourinary -

## 2024-03-27 NOTE — PROGRESS NOTES
[facility]   Daily Progress Note      Admit Date:  3/21/2024    CC: SOB    HPI:   Janae Perez is a 79 y.o. female with PMH significant for CAD, AF s/p PVI ablation 10/2020- recurrent AF with DCCV 1/2021 and repeat AF ablation 2/2021 (failed flecaininde), HTN, aortic aneurysm, AAA s/p repair 3/2018 (Dr. Harrison)COPD, DENISE (intolerant to CPAP)..  Abn stress>[facility] 3/2018 revealed  of RCA and non obstructive dz of LAD and LCX.  Adm to [facility] 5/2022 for dofetilide loading, converted to NSR.  Abn Stress followed by [facility] 9/20/2022 showed CAD.  S/P Cardiomems PA sensor implant 11/2022. Non complaint with readings.  GIB 4/2023 & 5/2023 requiring multiple units of PRBC>EGD with gastric AVMs.   Hx UTIs w confusion  Hx parotiditis     Multiple hospitalizations for resp failure and HF   Hospitalized  with COPD and HF  Most recent adm 2/29-3/3/2024 w acute on chronic resp failure R/T COPD and HF.  FU w Dr. Lopez in office 3/11/2024. Continued with SOB on 3L NC and appeared decompensated. Family reports non compliance with low Na diet and 64 fl oz  restriction. Added metolazone 2.5mg MWF. She was suppose to FU in 2 weeks.     Last cardiomems reading 3/18/2024 PAD 17 (Goal 20)     Sent to ED 3.20.24 by PCP for abn labs. K 2.4.   BUN/Creat 69/24, CHL 88/ CO2 37.     Have been holding diuretics.      C/O increased SOB overnight and hypertensive. Gave X1 dose catapress and IV hydralazine  Restarted po meds this AM. BP has improved.     Janae is awake and cooperative this AM but confused. She tells me she had visitors and was walking around the room this AM which is not true.   She gets SOB w conversation. Remains on 4 L NC.     Review of Systems:   General: Denies fever, chills  Skin: Denies skin changes, rash, itching, lesions.  HEENT: Denies headache, dizziness, vision changes, nosebleeds, sore throat, nasal drainage  RESP: Denies cough, sputum, wheeze, snoring  CARD: Denies palpitations,  murmur  GI:Denies nausea, 
    Physical Therapy    Facility/Department: [address] ORTHOPEDICS    Physical Therapy treatment note       Name: Janae Perez    : 1945    MRN: [id number]    Date of Service: 3/25/2024    Assessment / Discharge Recommendations:    -engages well for PTOT   -endurance severely limited  (4-5L O2 NC. (2L @ baseline)   -anticipate will need continued PT OT and nursing care in a skilled setting with slow pace   -will attempt up to walker at next session to see if able to safely begin room distances      Janae Perez scored a 10/24 on the AM-PAC short mobility form.   Current research shows that an AM-PAC score of 17 or less is typically not associated with a discharge to the patient's home setting.   Based on the patient's AM-PAC score and their current functional mobility deficits, it is recommended that the patient have 3-5 sessions per week of Physical Therapy at d/c to increase the patient's independence.        Patient Diagnosis(es): The primary encounter diagnosis was Hypokalemia. A diagnosis of MARK (acute kidney injury) (HCC) was also pertinent to this visit.  Past Medical History:  has a past medical history of AAA (abdominal aortic aneurysm) (HCC), AAA (abdominal aortic aneurysm) without rupture (HCC), Atrial fibrillation (HCC), CAD (coronary artery disease), CHF (congestive heart failure) (HCC), COPD (chronic obstructive pulmonary disease) (HCC), History of blood clots, Hyperlipidemia, and Hypertension.  Past Surgical History:  has a past surgical history that includes Colonoscopy (2007); Hysterectomy (); Appendectomy (); bladder suspension; Cataract removal; Tonsillectomy (as a child); tumor excision; Cholecystectomy (10/15/2013); Colonoscopy (2017); joint replacement (2013); Abdominal aortic aneurysm repair; Cardiac surgery; Cardiac catheterization (Right, 2022); Upper gastrointestinal endoscopy (N/A, 2023); Colonoscopy (N/A, 5/10/2023); Capsule endoscopy (N/A, 
    V2.0    [facility] Progress [place]      Name:  Janae Perez /Age/Sex: 1945  (79 y.o. female)   MRN & CSN:  [id number] & [id number] Encounter Date/Time: 3/25/2024 10:42 PM EDT   Location:  [id number]/3112-01 PCP: Boubacar Dhillon MD     Attending:Victoria De La O MD       Hospital Day: 5    Assessment and Recommendations   Janae Perez is a 79 y.o. female  who presents with MARK (acute kidney injury) (HCC)      Plan:     Acute on chronic respiratory failure:  Severe COPD  - needs to wear NIV at night but mostly declining  - contDulera, duonebs    Acute on chronic diastolic CHF  - s/p diuresis  - back on torsemide    Severe hypokalemia  Hypomagnesemia  - due to diuresis  - replete per protocol  - PICC line ordered    CAD hx;  - cont imdur and statin    Paroxysmal Afib:  cont xarelto  HTN:  cont home meds  Gout:  allopurinol renally adjusted      Diet ADULT DIET; Regular; Low Fat/Low Chol/High Fiber/2 gm Na; Low Sodium (2 gm); 1500 ml   DVT Prophylaxis [] Lovenox, []  Heparin, [] SCDs, [] Ambulation,  [] Eliquis, [] Xarelto  [] Coumadin   Code Status Limited             Personally reviewed Lab Studies and Imaging             Subjective:     Chief Complaint: respiratory failure    Alert and awake.  Electrolytes are low      Review of Systems:      Pertinent positives and negatives discussed in HPI    Objective:     Intake/Output Summary (Last 24 hours) at 3/25/2024 1650  Last data filed at 3/25/2024 1222  Gross per 24 hour   Intake 1050 ml   Output 1700 ml   Net -650 ml        Vitals:   Vitals:    24 0500 24 0715 24 0803 24 0814   BP: (!) 144/70  (!) 147/78 (!) 151/77   Pulse:   89 77   Resp:   15    Temp:   98.5 °F (36.9 °C)    TempSrc:   Oral    SpO2:  96% 96%    Weight:       Height:             Physical Exam:      General: NAD  Eyes: EOMI  ENT: neck supple  Cardiovascular: Regular rate.  Respiratory: tight and wheezing  Gastrointestinal: Soft, non tender  Genitourinary: no suprapubic 
    V2.0    [facility] [place]      Name:  Janae Perez /Age/Sex: 1945  (79 y.o. female)   MRN & CSN:  [id number] & [id number] Encounter Date/Time: 3/24/2024 10:42 PM EDT   Location:  [id number]/3112-01 PCP: Boubacar Dhillon MD     Attending:Victoria De La O MD       Hospital Day: 4    Assessment and Recommendations   Janae Perez is a 79 y.o. female  who presents with MARK (acute kidney injury) (HCC)      Plan:     Acute on chronic respiratory failure:  Severe COPD  - needs to wear NIV at night but mostly declining  - contDulera, duonebs    Acute on chronic diastolic CHF  - s/p diuresis  - back on torsemide    CAD hx;  - cont imdur and statin    Paroxysmal Afib:  cont xarelto  HTN:  cont home meds  Gout:  allopurinol renally adjusted      Diet ADULT DIET; Regular; Low Fat/Low Chol/High Fiber/2 gm Na; Low Sodium (2 gm); 1500 ml   DVT Prophylaxis [] Lovenox, []  Heparin, [] SCDs, [] Ambulation,  [] Eliquis, [] Xarelto  [] Coumadin   Code Status Limited             Personally reviewed Lab Studies and Imaging             Subjective:     Chief Complaint: respiratory failure    Awake and alert today, did wear NIV some yesterday      Review of Systems:      Pertinent positives and negatives discussed in HPI    Objective:     Intake/Output Summary (Last 24 hours) at 3/24/2024 2252  Last data filed at 3/24/2024 1700  Gross per 24 hour   Intake 1270 ml   Output 1400 ml   Net -130 ml        Vitals:   Vitals:    24 1147 24 1430 24 1912 24   BP: 139/66  131/64    Pulse: 94  87 94   Resp: 15  16 28   Temp: 99.6 °F (37.6 °C)  98.1 °F (36.7 °C)    TempSrc: Oral  Oral    SpO2: 95% 95% 96% 94%   Weight:       Height:             Physical Exam:      General: NAD  Eyes: EOMI  ENT: neck supple  Cardiovascular: Regular rate.  Respiratory: tight and wheezing  Gastrointestinal: Soft, non tender  Genitourinary: no suprapubic tenderness  Musculoskeletal: No edema  Skin: warm, dry  Neuro: awake and 
    V2.0  [facility]      Name:  Janae Perez /Age/Sex: 1945  (79 y.o. female)   MRN & CSN:  [id number] & [id number] Encounter Date/Time: 3/21/2024 1:29 PM EDT    Location:  [id number] PCP: Boubacar Dhillon MD       Hospital Day: 1    Assessment and Plan:   Janae Perez is a 79 y.o. female with medical history significant for oxygen dependent COPD, stage III CKD, hypertension, CAD, chronic diastolic heart failure, PE, paroxysmal A-fib, dyslipidemia and gout who presents with severe hypokalemia.    Assessment and Plan  1.  Severe hypokalemia with potassium of 2.3.  There are some T wave flattening.  Will supplement orally and IV and repeat 1 hour after supplementation.  Hold torsemide and metolazone.  Monitor on telemetry.    2.  CKD stage III.   Nephrology evaluation is pending.  Avoid nephrotoxic medication.    3.  Oxygen dependent COPD.  She is on 2 L oxygen at home.  Continue home inhalers and DuoNeb.    4.  Acute on chronic chronic diastolic heart failure.  EF of 65 to 70% on TTE from 2024.  She has bibasal crackles and lower extremity edema.  proBNP is elevated and chest x-ray on personal review is concerning for pulmonary edema.  Will discontinue IV fluid.  Will defer diuretics to nephrology/cardiology.  Monitor intake output and daily body weight.    5.  CAD s/p stenting.  Continue Imdur, statin.    6.  Paroxysmal A-fib/PE.  Continue Xarelto.    7.  Hypertension.  Continue hydralazine 50 mg 3 times daily, diltiazem 120 mg twice daily and clonidine 0.1 mg 3 times daily.  Monitor.    8.  History of gout.  Continue renally adjusted dose of allopurinol.    9.  Dyslipidemia.  Continue atorvastatin 40 mg daily.    10.  Elevated troponin likely due to type II NSTEMI.  EKG without finding of NSTEMI.    DVT prophylaxis:Lovenox     Disposition: TBD depending on further hospital course.    Personally reviewed Lab Studies and Imaging      Medical Decision Making:  The following items were 
  Nephrology Progress Note   [facility]      Reason for consultation:  MARK on CKD 3b/4 -- baseline Cr ~ 1.7-1.9 mg/dL. Follows with Dr. Obregon in office -- last seen 3/14/2024.      Subjective:    The patient has been seen and examined. Labs and chart reviewed. Resting in bed on 4 L NC. Endorses increased SOB overnight and this AM. States SOB has returned to baseline now. Cr has improved to 2.0 mg/dL and is close to baseline. K+ at goal. Hypertensive today -- BP meds resumed. Received 1x dose of IV lasix 60 mg.      Patient review of systems: Endorses SOB.      Allergies:  Allergies   Allergen Reactions    Morphine Rash     rash        Scheduled Meds:   hydrALAZINE  50 mg Oral 3 times per day    isosorbide mononitrate  120 mg Oral Daily    cloNIDine  0.1 mg Oral TID    sodium chloride flush  5-40 mL IntraVENous 2 times per day    enoxaparin  30 mg SubCUTAneous Daily    potassium chloride  40 mEq Oral BID    ipratropium 0.5 mg-albuterol 2.5 mg  1 Dose Inhalation Q6H WA RT    mometasone-formoterol  2 puff Inhalation BID RT    dilTIAZem  120 mg Oral BID        sodium chloride         PRN Meds:metoprolol, sodium chloride flush, sodium chloride, ondansetron **OR** ondansetron, polyethylene glycol, acetaminophen **OR** acetaminophen, albuterol, hydrOXYzine pamoate, hydrALAZINE    Physical Exam:    TEMPERATURE:  Current - Temp: 98.3 °F (36.8 °C); Max - Temp  Av.1 °F (36.7 °C)  Min: 97.7 °F (36.5 °C)  Max: 98.3 °F (36.8 °C)  RESPIRATIONS RANGE: Resp  Av  Min: 14  Max: 44  PULSE RANGE: Pulse  Av.3  Min: 58  Max: 111  BLOOD PRESSURE RANGE:  Systolic (24hrs), Av , Min:123 , Max:203   ; Diastolic (24hrs), Av, Min:54, Max:159    24HR INTAKE/OUTPUT:    Intake/Output Summary (Last 24 hours) at 3/22/2024 0811  Last data filed at 3/22/2024 0745  Gross per 24 hour   Intake 1651.13 ml   Output 2081 ml   Net -429.87 ml       Patient Vitals for the past 96 hrs (Last 3 readings):   Weight   24 0536 104.6 
1/2 bag of K admin, IV blew. This nurse attempted x3 to establish new IV access, with no success. Will cont to monitor  
4 Eyes Skin Assessment     NAME:  Janae Perez  YOB: 1945  MEDICAL RECORD NUMBER:  [id number]    The patient is being assessed for  Admission    I agree that at least one RN has performed a thorough Head to Toe Skin Assessment on the patient. ALL assessment sites listed below have been assessed.      Areas assessed by both nurses:    Head, Face, Ears, Shoulders, Back, Chest, Arms, Elbows, Hands, Sacrum. Buttock, Coccyx, Ischium, Legs. Feet and Heels, and Under Medical Devices         Does the Patient have a Wound? No noted wound(s)       Braydon Prevention initiated by RN: Yes  Wound Care Orders initiated by RN: No    Pressure Injury (Stage 3,4, Unstageable, DTI, NWPT, and Complex wounds) if present, place Wound referral order by RN under : No    New Ostomies, if present place, Ostomy referral order under : No     Nurse 1 eSignature: Electronically signed by Tj Jolley RN on 3/21/24 at 7:05 AM EDT    **SHARE this note so that the co-signing nurse can place an eSignature**    Nurse 2 eSignature: Electronically signed by Ayana Mireles RN on 3/21/24 at 2:19 PM EDT    
Another PRN IV dose of hydralazine given    Electronically signed by Whit Rojas RN on 3/22/2024 at 5:44 AM   
Arrived to place midline.     Patient in need of potassium replacement. Recommend PICC line instead of midline. Nephrology however disapproves for PICC placement.     RN okay with PIV access given that potassium is not recommended through a midline.     Ultrasound guided 22 G PIV placed right forearm. Catheter flushes without resistance and draws blood.     Ultrasound guided. 22G PIV placed left forearm catheter flushes without resistance and draws blood.          
CHANDA Thomas messaged again about patient persistent elevated BP despite PRN IV hydralazine. One time order of clonidine ordered.    Electronically signed by Whit Rojas RN on 3/22/2024 at 1:33 AM   
CHANDA Thomas messaged via perfect serve about patient elevated BP. PRN IV hydralazine ordered and given.    Electronically signed by Whit Rojas RN on 3/22/2024 at 1:04 AM   
Chart reviewed.  Discussion about hospice noted.  Renal function stable, so nothing new to add at this time.  Will follow-up tomorrow  
Checking on patient Q2H for nutrition needs, hygiene needs, comfort measures, mobility, fall risk interventions, and safe environment. All precautions and interventions in place. Educated patient on use of call light and telephone. Patient verbalizes understanding. Call light/telephone in reach.    
Checking on patient Q2H for nutrition needs, hygiene needs, comfort measures, mobility, fall risk interventions, and safe environment. All precautions and interventions in place. Educated patient on use of call light and telephone. Patient verbalizes understanding. Call light/telephone in reach.    
Checking on patient Q2H for nutrition needs, hygiene needs, comfort measures, mobility, fall risk interventions, and safe environment. All precautions and interventions in place. Educated patient on use of call light and telephone. Patient verbalizes understanding. Call light/telephone in reach. Electronically signed by ANIBAL GUILLERMO RN on 3/24/2024 at 8:59 AM   
Delayed due to patient care:    Patient continues to complain of increased SOB despite adequate SPO2 sats in 90s. Patient then complains of needing cream on bottom despite cream being just applied. Patient exhibiting very anxious behavior. NP messaged, PRN hydroxyzine ordered. Patient requested RN to call  so he will bring in patients shoes for tomorrow. RN suggest waiting until morning to call  as it is late. Patient insists on calling  at this time. RN attempted to call patient , Shirrel, no response, to voicemail.    Electronically signed by Whit Rjoas RN on 3/21/2024 at 10:46 PM   
Department of Internal Medicine  Nephrology Progress Note        REASON FOR CONSULTATION:  Acute kidney injury with hypokalemia.     HISTORY OF PRESENTING ILLNESS:  This is a 79-year-old  female with past medical history significant for obesity, hypertension, COPD, chronic respiratory failure, hypercholesterolemia, coronary artery disease, chronic Afib, chronic kidney disease stage 4, sent to ER because of abnormal labs.  The patient has also been complaining of shortness breath and dyspnea on exertion.  At the time of presentation, the patient was found to be hypokalemic with a potassium of 2.3 with acute kidney injury.  Admitted in ICU for further workup and management.  At the time of consultation, the patient is feeling and breathing better.  Admits improvement in shortness of breath.  Denies any nausea, vomiting, fever, chills, or rigors.  The patient admits being treated for UTI recently (details not available).  The patient is still complaining of some dysuria and some increased frequency of urination, but denies any fever.  Overnight, the patient is being treated for congestive heart failure as well as potassium being supplemented.          REVIEW OF SYSTEMS:  Improved SOB/LI     Physical Exam:    VITALS:  BP (!) 146/62   Pulse 71   Temp 98.3 °F (36.8 °C) (Oral)   Resp 28   Ht 1.626 m (5' 4\")   Wt 100 kg (220 lb 7.4 oz)   SpO2 98%   BMI 37.84 kg/m²   24HR INTAKE/OUTPUT:    Intake/Output Summary (Last 24 hours) at 3/21/2024 1442  Last data filed at 3/21/2024 1415  Gross per 24 hour   Intake 1002.38 ml   Output 831 ml   Net 171.38 ml       Constitutional:  resting   Respiratory:  Few Rhonchi   Gastrointestinal:  No  tenderness.  Normal Bowel Sounds  Cardiovascular:  S1, S2 Irregular   Edema: +  edema    DATA:    CBC:  Lab Results   Component Value Date/Time    WBC 12.2 03/21/2024 01:27 AM    RBC 3.48 03/21/2024 01:27 AM    RBC 4.19 04/13/2017 12:56 PM    HGB 10.3 03/21/2024 01:27 AM    HCT 
Department of Internal Medicine  Nephrology Progress Note        REASON FOR CONSULTATION:  Acute kidney injury with hypokalemia.     HISTORY OF PRESENTING ILLNESS:  This is a 79-year-old  female with past medical history significant for obesity, hypertension, COPD, chronic respiratory failure, hypercholesterolemia, coronary artery disease, chronic Afib, chronic kidney disease stage 4, sent to ER because of abnormal labs.  The patient has also been complaining of shortness breath and dyspnea on exertion.  At the time of presentation, the patient was found to be hypokalemic with a potassium of 2.3 with acute kidney injury.  Admitted in ICU for further workup and management.  At the time of consultation, the patient is feeling and breathing better.  Admits improvement in shortness of breath.  Denies any nausea, vomiting, fever, chills, or rigors.  The patient admits being treated for UTI recently (details not available).  The patient is still complaining of some dysuria and some increased frequency of urination, but denies any fever.  Overnight, the patient is being treated for congestive heart failure as well as potassium being supplemented.        Events noted . Chart reviewed .   Ct chest reviewed .   REVIEW OF SYSTEMS:  Improved SOB/LI .   Feels weak/tired , sleepy . Poor appetite  .  Family at bed side     Physical Exam:    VITALS:  /66   Pulse 94   Temp 99.6 °F (37.6 °C) (Oral)   Resp 15   Ht 1.626 m (5' 4\")   Wt 101.5 kg (223 lb 12.3 oz)   SpO2 95%   BMI 38.41 kg/m²   24HR INTAKE/OUTPUT:    Intake/Output Summary (Last 24 hours) at 3/24/2024 1459  Last data filed at 3/24/2024 1443  Gross per 24 hour   Intake 1350 ml   Output 1900 ml   Net -550 ml         Constitutional:  resting   Respiratory: Decrease BS at bases .   Gastrointestinal:  No  tenderness.  Normal Bowel Sounds  Cardiovascular:  S1, S2 Irregular   Edema: +  edema    DATA:    CBC:  Lab Results   Component Value Date/Time    WBC 
Department of Internal Medicine  Nephrology Progress Note        REASON FOR CONSULTATION:  Acute kidney injury with hypokalemia.     HISTORY OF PRESENTING ILLNESS:  This is a 79-year-old  female with past medical history significant for obesity, hypertension, COPD, chronic respiratory failure, hypercholesterolemia, coronary artery disease, chronic Afib, chronic kidney disease stage 4, sent to ER because of abnormal labs.  The patient has also been complaining of shortness breath and dyspnea on exertion.  At the time of presentation, the patient was found to be hypokalemic with a potassium of 2.3 with acute kidney injury.  Admitted in ICU for further workup and management.  At the time of consultation, the patient is feeling and breathing better.  Admits improvement in shortness of breath.  Denies any nausea, vomiting, fever, chills, or rigors.  The patient admits being treated for UTI recently (details not available).  The patient is still complaining of some dysuria and some increased frequency of urination, but denies any fever.  Overnight, the patient is being treated for congestive heart failure as well as potassium being supplemented.        Events noted . Chart reviewed .   Ct chest reviewed .   REVIEW OF SYSTEMS:  Still   SOB/LI     Physical Exam:    VITALS:  BP (!) 152/70   Pulse 83   Temp 98.4 °F (36.9 °C) (Oral)   Resp 16   Ht 1.626 m (5' 4\")   Wt 105.1 kg (231 lb 11.3 oz)   SpO2 96%   BMI 39.77 kg/m²   24HR INTAKE/OUTPUT:    Intake/Output Summary (Last 24 hours) at 3/23/2024 1517  Last data filed at 3/23/2024 1158  Gross per 24 hour   Intake 600 ml   Output 1100 ml   Net -500 ml         Constitutional:  resting   Respiratory: Decrease BS at bases .   Gastrointestinal:  No  tenderness.  Normal Bowel Sounds  Cardiovascular:  S1, S2 Irregular   Edema: +  edema    DATA:    CBC:  Lab Results   Component Value Date/Time    WBC 11.0 03/23/2024 04:58 AM    RBC 3.21 03/23/2024 04:58 AM    RBC 4.19 
Department of Internal Medicine  Nephrology Progress Note        REASON FOR CONSULTATION:  Acute kidney injury with hypokalemia.     HISTORY OF PRESENTING ILLNESS:  This is a 79-year-old  female with past medical history significant for obesity, hypertension, COPD, chronic respiratory failure, hypercholesterolemia, coronary artery disease, chronic Afib, chronic kidney disease stage 4, sent to ER because of abnormal labs.  The patient has also been complaining of shortness breath and dyspnea on exertion.  At the time of presentation, the patient was found to be hypokalemic with a potassium of 2.3 with acute kidney injury.  Admitted in ICU for further workup and management.  At the time of consultation, the patient is feeling and breathing better.  Admits improvement in shortness of breath.  Denies any nausea, vomiting, fever, chills, or rigors.  The patient admits being treated for UTI recently (details not available).  The patient is still complaining of some dysuria and some increased frequency of urination, but denies any fever.  Overnight, the patient is being treated for congestive heart failure as well as potassium being supplemented.        Events noted . Chart reviewed .   REVIEW OF SYSTEMS:  Increase  SOB/LI     Physical Exam:    VITALS:  /89   Pulse 93   Temp 98.3 °F (36.8 °C) (Oral)   Resp 28   Ht 1.626 m (5' 4\")   Wt 104.6 kg (230 lb 9.6 oz)   SpO2 96%   BMI 39.58 kg/m²   24HR INTAKE/OUTPUT:    Intake/Output Summary (Last 24 hours) at 3/22/2024 1442  Last data filed at 3/22/2024 1230  Gross per 24 hour   Intake 1128.75 ml   Output 1850 ml   Net -721.25 ml         Constitutional:  resting   Respiratory:  Few Rhonchi / rales   Gastrointestinal:  No  tenderness.  Normal Bowel Sounds  Cardiovascular:  S1, S2 Irregular   Edema: +  edema    DATA:    CBC:  Lab Results   Component Value Date/Time    WBC 15.0 03/22/2024 04:59 AM    RBC 3.48 03/22/2024 04:59 AM    RBC 4.19 04/13/2017 12:56 PM 
Department of Internal Medicine  Nephrology Progress Note        REASON FOR CONSULTATION:  Acute kidney injury with hypokalemia.     HISTORY OF PRESENTING ILLNESS:  This is a 79-year-old  female with past medical history significant for obesity, hypertension, COPD, chronic respiratory failure, hypercholesterolemia, coronary artery disease, chronic Afib, chronic kidney disease stage 4, sent to ER because of abnormal labs.  The patient has also been complaining of shortness breath and dyspnea on exertion.  At the time of presentation, the patient was found to be hypokalemic with a potassium of 3.3 with acute kidney injury.  Admitted in ICU for further workup and management.  At the time of consultation, the patient is feeling and breathing better.  Admits improvement in shortness of breath.  Denies any nausea, vomiting, fever, chills, or rigors.  The patient admits being treated for UTI recently (details not available).  The patient is still complaining of some dysuria and some increased frequency of urination, but denies any fever.  Overnight, the patient is being treated for congestive heart failure as well as potassium being supplemented.    HPI:  Breathing comfortably.  Sleepy.  O > I.  ROS:  In bed.  PMFSH:  medications reviewed.    Physical Exam:    VITALS:  BP (!) 151/77   Pulse 77   Temp 98.5 °F (36.9 °C) (Oral)   Resp 15   Ht 1.626 m (5' 4\")   Wt 102 kg (224 lb 13.9 oz)   SpO2 96%   BMI 38.60 kg/m²   24HR INTAKE/OUTPUT:    Intake/Output Summary (Last 24 hours) at 3/25/2024 1323  Last data filed at 3/25/2024 1222  Gross per 24 hour   Intake 1050 ml   Output 1500 ml   Net -450 ml         Constitutional:  resting   Respiratory: Decrease BS at bases .   Gastrointestinal:  No  tenderness.  Normal Bowel Sounds  Cardiovascular:  S1, S2 Irregular   Edema: +  edema    DATA:    CBC:  Lab Results   Component Value Date/Time    WBC 10.8 03/25/2024 03:23 AM    RBC 3.16 03/25/2024 03:23 AM    RBC 4.19 
Forwarded perfect serve conversation to  ICU night MD Ha.    Electronically signed by Whit Rojas RN on 3/22/2024 at 6:02 AM   
I had to reorder the poatssium, dr bahena is aware, bc they are just now placing midline. Electronically signed by Jo Sung RN on 3/25/2024 at 1:58 PM   
Lab reported potassium 2.8, orders placed per hospitalist, orders admin. Will cont to monitor and assess  
Left message for patient's daughter Anastacio with no response yet.  Went to patient room to speak with patient's  and see if he would be agreeable to discussing hospice since Anastacio is not available at this time.   stated, I just want to take her home and we're waiting for paperwork.  Asked  if he has oxygen available for her.  He showed me her portable oxygen and belongings all ready for discharge.  Asked  if he would be agreeable to a meeting at home at a time when Anastacio could be included.  He was agreeable to that plan.  Updated Sera CONTRERAS.  Will notify HOC of home meeting if patient is discharged vs follow up during admission.  Will continue to attempt to contact Anastacio.    Patricia Guan RN  Hospital Liaison   [phone]     
Nephro cleared pt for midline. Electronically signed by Jo Sung RN on 3/25/2024 at 12:53 PM   
Occupational Therapy  Facility/Department: [address] ORTHOPEDICS  Occupational Therapy Daily Treatment  Name: Janae Perez  : 1945  MRN: [id number]  Date of Service: 3/25/2024    Discharge Recommendations:  Patient would benefit from continued therapy after discharge, 3-5 sessions per week  OT Equipment Recommendations  Other: defer to [place] facility       Janae Perez scored a 15/24 on the AM-PAC ADL Inpatient form. Current research shows that an AM-PAC score of 17 or less is typically not associated with a discharge to the patient's home setting. Based on the patient's AM-PAC score and their current ADL deficits, it is recommended that the patient have 3-5 sessions per week of Occupational Therapy at d/c to increase the patient's independence.  Please see assessment section for further patient specific details.    If patient discharges prior to next session this note will serve as a discharge summary.  Please see below for the latest assessment towards goals.        Patient Diagnosis(es): The primary encounter diagnosis was Hypokalemia. A diagnosis of MARK (acute kidney injury) (Formerly [facility]) was also pertinent to this visit.  Past Medical History:  has a past medical history of AAA (abdominal aortic aneurysm) (HCC), AAA (abdominal aortic aneurysm) without rupture (HCC), Atrial fibrillation (HCC), CAD (coronary artery disease), CHF (congestive heart failure) (HCC), COPD (chronic obstructive pulmonary disease) (HCC), History of blood clots, Hyperlipidemia, and Hypertension.  Past Surgical History:  has a past surgical history that includes Colonoscopy (2007); Hysterectomy (); Appendectomy (); bladder suspension; Cataract removal; Tonsillectomy (as a child); tumor excision; Cholecystectomy (10/15/2013); Colonoscopy (2017); joint replacement (2013); Abdominal aortic aneurysm repair; Cardiac surgery; Cardiac catheterization (Right, 2022); Upper gastrointestinal endoscopy (N/A, 2023); 
Occupational Therapy  Facility/Department: [address] ICU  Occupational Therapy Initial Assessment    Name: Janae Perez  : 1945  MRN: [id number]  Date of Service: 3/22/2024    Discharge Recommendations:  Patient would benefit from continued therapy after discharge, 3-5 sessions per week  OT Equipment Recommendations  Other: defer to [place] facility     Janae Perez scored a 16/24 on the AM-PAC ADL Inpatient form. Current research shows that an AM-PAC score of 17 or less is typically not associated with a discharge to the patient's home setting. Based on the patient's AM-PAC score and their current ADL deficits, it is recommended that the patient have 3-5 sessions per week of Occupational Therapy at d/c to increase the patient's independence.  Please see assessment section for further patient specific details.    If patient discharges prior to next session this note will serve as a discharge summary.  Please see below for the latest assessment towards goals.      Patient Diagnosis(es): The primary encounter diagnosis was Hypokalemia. A diagnosis of MARK (acute kidney injury) (HCC) was also pertinent to this visit.  Past Medical History:  has a past medical history of AAA (abdominal aortic aneurysm) (HCC), AAA (abdominal aortic aneurysm) without rupture (HCC), Atrial fibrillation (HCC), CAD (coronary artery disease), CHF (congestive heart failure) (HCC), COPD (chronic obstructive pulmonary disease) (HCC), History of blood clots, Hyperlipidemia, and Hypertension.  Past Surgical History:  has a past surgical history that includes Colonoscopy (2007); Hysterectomy (); Appendectomy (); bladder suspension; Cataract removal; Tonsillectomy (as a child); tumor excision; Cholecystectomy (10/15/2013); Colonoscopy (2017); joint replacement (2013); Abdominal aortic aneurysm repair; Cardiac surgery; Cardiac catheterization (Right, 2022); Upper gastrointestinal endoscopy (N/A, 2023); Colonoscopy 
PRN metoprolol and one time dose ativan ordered and given.     Electronically signed by Whit Rojas RN on 3/22/2024 at 6:28 AM   
Patient arrived to room 3112 from  ED. Patient is A/Ox3. Oriented patient to room and call light. Fall assessment completed, patient denies any fall history. Instructed patient to call for help out of bed, patient verbalized understanding. Patient denies any further needs at this time, will continue to monitor and assess for needs and comfort.  Electronically signed by ANIBAL GUILLERMO RN on 3/23/2024 at 8:57 AM   
Patient called RN into room stating \"I can't breathe, can you help me breathe\". Patient O2 sats 94% respirations 42/min. Patient reminded about taking long deep breaths rather than short, rapid, shallow breaths. Patient agreeable with plan of care at this time.    Electronically signed by Whit Rojas RN on 3/22/2024 at 6:37 AM   
Patient continues to be hypertensive. MD anne messaged via perfect serve at this time. Patient continues to exhibit anxious behavior stating: \"I can't breathe\" \"I'm going to die, I'm going to die\" despite O2 sats in the 90s and satisfactory. Per patient request, O2 via nasal cannula increased to 3L. RN educated patient on taking long deep breaths and that she is feeling SOB due to breathing 40 times a minute. Patient continues to complain of SOB. MD notified.    Electronically signed by Whit Rojas RN on 3/22/2024 at 5:48 AM   
Patient is alert and orient X 3. When I asked her month and day, she stated\"2,24/24), she is easily reoriented. Denies pain and SOB. Pt. Is on 4 Liters NS. When I did her pedal pulse assessment. Pt screamed stating \"Do not touch my feet, I am going to kick you\".  at bedside calming pt down. Staff educate patient and the care she need, co-operate with staff. Get well soon and go home to take care of self for optimum health. Pt. Lying on bed comfortably. Denies any needs at the movement. Care on going.    
Patient resting in bed throughout night. Patient is alert and oriented x4. Potassium replacement completed. Patient refusing turns throughout night to prevent skin breakdown. Patient is still on 4L o2 NC at this time. All needs have been met, call light within reach.    Electronically signed by Mirna Veras RN on 3/26/2024 at 2:46 AM   
Patient's daughter, Anastacio, returned my call and came to hospital for hospice discussion.  Patient was tachypneic and complained of shortness of breath.  Discussed IPCC for short term stay for the purpose of symptom management prior to return home.  Patient and family are agreeable HOC [facility].  Consent signed by patient.  Transport arranged with [facility] to transport at 1930.   Please Ieave IV s in place.  Please call with any questions.    Patricia Guan RN  Hospital Liaison   [phone]                
Pharmacy Medication Reconciliation Note     List of medications patient is currently taking is complete.    Source of information:   1. Rx disp history  2. Interview with patient and   3. [facility] pharmacy ([facility])    Notes regarding home medications:   1. Added Ferrous Sulfate 325mg bid (recently told to restart)  2. Added KCl 20mEq daily. Theresa confirms picked up 3-8-24.  states not on paperwork so does not think he was giving to her.    Denies taking any other OTC or herbal medications    Silvestre Canseco RP, ELIAN 3/25/2024 11:27 AM      
Physical Therapy  Facility/Department: [address] ICU  Physical Therapy Initial Assessment    Name: Janae Perez  : 1945  MRN: [id number]  Date of Service: 3/22/2024    Discharge Recommendations:  Continue to assess pending progress, Subacute/Skilled Nursing Facility   PT Equipment Recommendations  Other: Defer to next level of care.  Janae Perez scored a 10/24 on the AM-PAC short mobility form. Current research shows that an AM-PAC score of 17 or less is typically not associated with a discharge to the patient's home setting. Based on the patient's AM-PAC score and their current functional mobility deficits, it is recommended that the patient have 3-5 sessions per week of Physical Therapy at d/c to increase the patient's independence.  Please see assessment section for further patient specific details.    If patient discharges prior to next session this note will serve as a discharge summary.  Please see below for the latest assessment towards goals.            Assessment   Body Structures, Functions, Activity Limitations Requiring Skilled Therapeutic Intervention: Decreased functional mobility ;Decreased strength;Decreased safe awareness;Decreased endurance;Decreased balance  Assessment: 78 y/o female admit 3/21/2024 with MARK, Severe Hypokalemia. PMH as noted including CHF, CAD, PAF, AAA Repair, CKD, H/O Gout.  PTA pt living with  in mobile home with ramp access; reports assist daily care (as needed) and amb with Walker short distances in home.  Currently, pt requiring O2 2L (baseline); Mod assist to eob and Min assist oob via Stedy.  Pt with very limited endurance/dyspneic quickly; brief desat mid 80's with RR 20-30's.  Recovers with extended seated rest.  At this time, would anticipate cont PT (3-5) upon d/c.  Will cont to monitor pt's progress.  Therapy Prognosis: Fair;Good  Decision Making: Medium Complexity  History: 78 y/o female admit 3/21/2024 with MARK, Severe Hypokalemia. PMH as noted 
Pt complaining of SOB.  Placed pt on bipap, duoneb given inline.  Pt wore bipap for 15 minutes and ripped mask off her face.  Pt placed back on 4L NC.     Electronically signed by Shelbie Talbot RCP on 3/24/2024 at 8:53 PM   
Pt discharged to [facility] via stretcher with squad. Report given to transport.  at bedside at time of discharge. Patient d/c with two PIV in each forearm. Pt on 5L O2. All needs met.     Electronically signed by Mirna Veras RN on 3/26/2024 at 8:03 PM   
Pt in bed eyes closed easy to arouse. Spouse at bedside. Respiration even and unlabored at this time pt currently on 4L current O2 96% with wheezing.  Afternoon medications administered, pt tolerated well. Pt states she has no other needs at this time. Call light placed within reach. Electronically signed by Rere Myles RN on 3/23/2024 at 2:04 PM    
Pt is with complaints of SOB, PO2 97% 4L NC. Lungs diminished, PMH COPD. Nurse called respiratory for a duo neb PRN. They are in a code on another floor and will admin once code is over. PRN anti anxiety given. Pt still refusing CPAP at this time. Will cont to monitor and assess  
Pt refusing CPAP at this time. 4L N/C reapplied  
Pt refusing NIV for tonight. Pt also refused HHN and MDI. Pt on 5 L NC, SpO2 96%.  
Pt refusing t&r . Pt reports it \"makes her SOB worse 1-1 and education provided   
Pt refusing t&r throughout shift. Pt reports it \"makes her SOB worse 1-1 and education provided  
Pt removed CPAP at 0345, NC was reapplied per patient request  
Pt to ICU room 2110 via bed from ED. Report received from ER RN. All questions answered. Pt is alert and oriented times 4 with a GCS of 15. Pt has an open and patent airway on 2LPM NC which is her baseline.  
Respiratory at bedside, admin breathing TX and reapplied CPAP. Once they left, pt removed CPAP, nurse went to bedside, educated patient, agreeable for nurse to reapply at this time.   
Reviewed cardiomems readings- PAD 33,31,34.   Suggestive of hypervolemia- cont with plan to start torsemide.   Cards signing off and will FU as OP.   
Shift Summary: Patient intermittently complains of SOB during shift despite sats being stable. BP elevated despite hydralazine restarted. PRN IV hydralazine ordered along with one time dose of clonidine. Patient exhibits very anxious behavior (using call light inappropriately, increased resp rate, yelling out into hallway despite being asked not to, inability to fall asleep) PRN hydroxizine ordered. x2 PRN IV hydralazine given total. PRN metoprolol and one time dose of ativan ordered and given.        Family updated: yes,  updated over the phone    Most recent vitals: BP (!) 182/159   Pulse 99   Temp 98.3 °F (36.8 °C) (Oral)   Resp (!) 34   Ht 1.626 m (5' 4\")   Wt 104.6 kg (230 lb 9.6 oz)   SpO2 93%   BMI 39.58 kg/m²      Rhythm: NSR, sinus arrhythmia    NC/HFNC- 3 lpm  Respiratory support: - No ventilator support      Admission weight Weight - Scale: 102.9 kg (226 lb 12.8 oz)  Today's weight   Wt Readings from Last 1 Encounters:   03/22/24 104.6 kg (230 lb 9.6 oz)         UOP >30ml/hr: Yes external catheter        Restraints: no Order current and documentation up to date? na    Lines/Drains reviewed @ bedside.  Peripheral IV 03/21/24 Right Forearm (Active)   Number of days: 1     Linder need assessed each shift: no      Drip rates at handoff:    sodium chloride         Lab Data:   CBC:   Recent Labs     03/21/24  0127 03/22/24  0459   WBC 12.2* 15.0*   HGB 10.3* 10.2*   HCT 30.6* 30.5*   MCV 87.9 87.6    203     BMP:    Recent Labs     03/21/24  2221 03/22/24  0459   * 136   K 3.5  3.6 3.8   CO2 31 30   BUN 61* 56*   CREATININE 1.9* 2.0*     LIVR: No results for input(s): \"AST\", \"ALT\" in the last 72 hours.  PT/INR: No results for input(s): \"PROT\", \"INR\" in the last 72 hours.  APTT: No results for input(s): \"APTT\" in the last 72 hours.  ABG: No results for input(s): \"PHART\", \"KLO3FOK\", \"PO2ART\" in the last 72 hours.    Any consults during the shift? no    Any signed and held orders to be 
Shift Summary: Patient received 60mg lasix 1x. WOB increased, orders for bipap. Pt wore bipap for two hours. Cardiomems done. Up to chair with PT/OT        Family updated: yes -     Most recent vitals: BP (!) 149/87   Pulse (!) 103   Temp 98.3 °F (36.8 °C) (Oral)   Resp 23   Ht 1.626 m (5' 4\")   Wt 104.6 kg (230 lb 9.6 oz)   SpO2 94%   BMI 39.58 kg/m²      Rhythm: Atrial Fibrillation      NC/HFNC- 2 lpm  Respiratory support: - No ventilator support        Admission weight Weight - Scale: 102.9 kg (226 lb 12.8 oz)  Today's weight   Wt Readings from Last 1 Encounters:   03/22/24 104.6 kg (230 lb 9.6 oz)         UOP >30ml/hr: yes -          Restraints: no  Order current and documentation up to date?    Lines/Drains reviewed @ bedside.  Peripheral IV 03/21/24 Right Forearm (Active)   Number of days: 1     Linder need assessed each shift: no      Drip rates at handoff:    sodium chloride         Lab Data:   CBC:   Recent Labs     03/21/24  0127 03/22/24  0459   WBC 12.2* 15.0*   HGB 10.3* 10.2*   HCT 30.6* 30.5*   MCV 87.9 87.6    203     BMP:    Recent Labs     03/21/24  2221 03/22/24  0459   * 136   K 3.5  3.6 3.8   CO2 31 30   BUN 61* 56*   CREATININE 1.9* 2.0*     LIVR: No results for input(s): \"AST\", \"ALT\" in the last 72 hours.  PT/INR: No results for input(s): \"PROT\", \"INR\" in the last 72 hours.  APTT: No results for input(s): \"APTT\" in the last 72 hours.  ABG:   Recent Labs     03/22/24  1436   PHART 7.416   GLU2XEJ 51.6*   PO2ART 140.0*       Any consults during the shift? Critical care    Any signed and held orders to be released?  no        4 Eyes Skin Assessment     NAME:  Janae Perez  YOB: 1945  MEDICAL RECORD NUMBER:  [id number]    The patient is being assessed for  Shift Handoff    I agree that at least one RN has performed a thorough Head to Toe Skin Assessment on the patient. ALL assessment sites listed below have been assessed.      Areas assessed by both 
Shift Summary: Pt K up to 3.1. Nephrology consulted. 24 hour urine started at 1230        Family updated: yes -     Most recent vitals: BP (!) 161/54   Pulse 89   Temp 98.2 °F (36.8 °C) (Oral)   Resp 26   Ht 1.626 m (5' 4\")   Wt 100 kg (220 lb 7.4 oz)   SpO2 97%   BMI 37.84 kg/m²      Rhythm: Atrial Fibrillation      NC/HFNC- 2 lpm  Respiratory support: - No ventilator support        Admission weight Weight - Scale: 102.9 kg (226 lb 12.8 oz)  Today's weight   Wt Readings from Last 1 Encounters:   03/21/24 100 kg (220 lb 7.4 oz)         UOP >30ml/hr: yes -          Restraints: no  Order current and documentation up to date?    Lines/Drains reviewed @ bedside.  Peripheral IV 03/21/24 Right Forearm (Active)   Number of days: 0     Linder need assessed each shift: no      Drip rates at handoff:    sodium chloride         Lab Data:   CBC:   Recent Labs     03/20/24  1333 03/21/24  0127   WBC 11.0 12.2*   HGB 10.6* 10.3*   HCT 31.8* 30.6*   MCV 88.3 87.9    197     BMP:    Recent Labs     03/20/24  1333 03/21/24  0127 03/21/24  0432 03/21/24  1121    138  --   --    K 2.5* 2.3* 2.4* 3.1*   CO2 36* 37*  --   --    BUN 68* 69*  --   --    CREATININE 2.3* 2.4*  --   --      LIVR: No results for input(s): \"AST\", \"ALT\" in the last 72 hours.  PT/INR: No results for input(s): \"PROT\", \"INR\" in the last 72 hours.  APTT: No results for input(s): \"APTT\" in the last 72 hours.  ABG: No results for input(s): \"PHART\", \"RUB6YMC\", \"PO2ART\" in the last 72 hours.    Any consults during the shift? Nephrology    Any signed and held orders to be released?  no        4 Eyes Skin Assessment     NAME:  Janae Perez  YOB: 1945  MEDICAL RECORD NUMBER:  [id number]    The patient is being assessed for  Shift Handoff    I agree that at least one RN has performed a thorough Head to Toe Skin Assessment on the patient. ALL assessment sites listed below have been assessed.      Areas assessed by both nurses:    Head, 
Shift Summary: Pt admitted for ICU for low K.        Family updated: yes -     Most recent vitals: BP (!) 129/117   Pulse 57   Temp 98.4 °F (36.9 °C) (Oral)   Resp 15   Ht 1.626 m (5' 4\")   Wt 100 kg (220 lb 7.4 oz)   SpO2 93%   BMI 37.84 kg/m²      Rhythm: Atrial Fibrillation      NC/HFNC- 3 lpm  Respiratory support: - No ventilator support    Vent days: Day 0    Admission weight Weight - Scale: 102.9 kg (226 lb 12.8 oz)  Today's weight   Wt Readings from Last 1 Encounters:   03/21/24 100 kg (220 lb 7.4 oz)         UOP >30ml/hr: no  Order current and documentation up to date?    Lines/Drains reviewed @ bedside.  Peripheral IV 03/21/24 Right Forearm (Active)   Number of days: 0     Linder need assessed each shift: no      Drip rates at handoff:    sodium chloride      sodium chloride      0.9% NaCl with KCl 40 mEq 100 mL/hr at 03/21/24 0601       Lab Data:   CBC:   Recent Labs     03/20/24  1333 03/21/24  0127   WBC 11.0 12.2*   HGB 10.6* 10.3*   HCT 31.8* 30.6*   MCV 88.3 87.9    197     BMP:    Recent Labs     03/20/24  1333 03/21/24  0127 03/21/24  0432    138  --    K 2.5* 2.3* 2.4*   CO2 36* 37*  --    BUN 68* 69*  --    CREATININE 2.3* 2.4*  --      LIVR: No results for input(s): \"AST\", \"ALT\" in the last 72 hours.  PT/INR: No results for input(s): \"PROT\", \"INR\" in the last 72 hours.  APTT: No results for input(s): \"APTT\" in the last 72 hours.  ABG: No results for input(s): \"PHART\", \"FMQ7OBJ\", \"PO2ART\" in the last 72 hours.    Any consults during the shift? no    Any signed and held orders to be released?  no        4 Eyes Skin Assessment     NAME:  Janae Perez  YOB: 1945  MEDICAL RECORD NUMBER:  [id number]    The patient is being assessed for  Shift Handoff    I agree that at least one RN has performed a thorough Head to Toe Skin Assessment on the patient. ALL assessment sites listed below have been assessed.      Areas assessed by both nurses:    Head, Face, Ears, 
This RN called patient's  Tamyseth at patient's request. Updated about nights events and plan of care. Tamyrel states understanding at this time and agreeable with plan of care.    Electronically signed by Whit Rojas RN on 3/22/2024 at 6:47 AM   
When niesha care was preformed pt attempted to hit nurse, education and 1-1 was provided at that time.  
03/21/2024 10:34 AM    BILIRUBINUR neg 07/19/2014 08:36 AM    BLOODU Negative 03/21/2024 10:34 AM    GLUCOSEU Negative 03/21/2024 10:34 AM    KETUA Negative 03/21/2024 10:34 AM     Urine Cultures:   Lab Results   Component Value Date/Time    LABURIN >100,000 CFU/ml 02/29/2024 04:22 PM     Blood Cultures:   Lab Results   Component Value Date/Time    BC No Growth after 4 days of incubation. 02/14/2024 07:46 AM     Lab Results   Component Value Date/Time    BLOODCULT2 No Growth after 4 days of incubation. 02/14/2024 07:31 AM     Organism:   Lab Results   Component Value Date/Time    ORG Escherichia coli 02/29/2024 04:22 PM         Electronically signed by Victoria De La O MD on 3/24/2024 at 10:42 PM  
The overall configuration is similar to the old exam.  The diaphragm is still visualized without a significant pleural effusion but small amounts of pleural effusion along the posterior aspect are not excluded.  There is no sign of pneumothorax. Limited bony evaluation.     Cardiac silhouette remains enlarged with mild pulmonary vascular congestion and mild interstitial prominence.  No large effusion and no pneumothorax.        This note was transcribed using Dragon Dictation software. Please disregard any translational errors.   Electronically signed by Bernard Lewis MD on 3/22/2024 at 11:40 AM

## 2024-03-28 ENCOUNTER — CARE COORDINATION (OUTPATIENT)
Dept: PRIMARY CARE CLINIC | Age: 79
End: 2024-03-28

## 2024-03-28 ENCOUNTER — CARE COORDINATION (OUTPATIENT)
Dept: CASE MANAGEMENT | Age: 79
End: 2024-03-28

## 2024-03-28 DIAGNOSIS — J44.9 COPD, SEVERE (HCC): ICD-10-CM

## 2024-03-28 DIAGNOSIS — I50.22 CHRONIC SYSTOLIC (CONGESTIVE) HEART FAILURE (HCC): Primary | ICD-10-CM

## 2024-03-28 NOTE — PROGRESS NOTES
Remote Patient Order Discontinued    Received request from Sera Shankar RN   to discontinue order for remote patient monitoring of CHF and COPD and order completed.

## 2024-03-28 NOTE — CARE COORDINATION
RPM Kit Return    Janae Perez  3/28/24    Care Coordination  placed call to patient to arrange RPM kit  through [employer].     CCSS spoke to Spouse reviewed with Spouse how to pack equipment in original packing. Spouse aware [employer] will  equipment in 2-4 days.   All questions and concerns answered.

## 2024-04-09 ENCOUNTER — TELEPHONE (OUTPATIENT)
Dept: CARDIOLOGY CLINIC | Age: 79
End: 2024-04-09

## 2024-04-09 NOTE — TELEPHONE ENCOUNTER
[facility]era from [facility] called regarding  patient's cardiomems pillow. Leah reached out to Rep and was given a couple of options.  Our team can have [employer] pick it up from their home with notice of 3 days (I’ll just need patient name and address to be returned)  2. They can bring it back to clinic for me to return  3. They can bring it back to clinic to be used for another demo.     LVM for Sera and let her know [phone]

## 2024-05-06 NOTE — PROGRESS NOTES
Detail Level: Generalized Subjective:      Patient ID: Tanja Chavez is a 68 y.o. female.     Patient presents with:  Rash: itchy rash on stomach x 1 week    One week of itch rash in the folds  Of the abdomen   No fever no pain      YOB: 1945    Date of Visit:  8/8/2018     -- Morphine -- Rash    Current Outpatient Prescriptions:  metoprolol succinate (TOPROL XL) 50 MG extended release tablet, Take 1 tablet by mouth daily, Disp: 30 tablet, Rfl: 3  lisinopril (PRINIVIL;ZESTRIL) 10 MG tablet, Take 1 tablet by mouth daily, Disp: 30 tablet, Rfl: 3  isosorbide mononitrate (IMDUR) 30 MG extended release tablet, Take 1 tablet by mouth daily, Disp: 30 tablet, Rfl: 5  rosuvastatin (CRESTOR) 40 MG tablet, Take 1 tablet by mouth daily, Disp: 90 tablet, Rfl: 3  furosemide (LASIX) 40 MG tablet, Take 1 tablet by mouth 2 times daily (Patient taking differently: Take 60 mg by mouth 2 times daily ), Disp: 60 tablet, Rfl: 3  tiotropium (SPIRIVA RESPIMAT) 2.5 MCG/ACT AERS inhaler, Inhale 2 puffs into the lungs daily, Disp: 1 Inhaler, Rfl: 5  XARELTO 20 MG TABS tablet, TAKE ONE TABLET BY MOUTH DAILY WITH BREAKFAST, Disp: 30 tablet, Rfl: 5  albuterol sulfate HFA (PROAIR HFA) 108 (90 Base) MCG/ACT inhaler, Inhale 2 puffs into the lungs every 4 hours as needed for Wheezing or Shortness of Breath, Disp: 1 Inhaler, Rfl: 5  albuterol (PROVENTIL) (2.5 MG/3ML) 0.083% nebulizer solution, Take 2.5 mg by nebulization 4 times daily, Disp: , Rfl:   nitroGLYCERIN (NITROSTAT) 0.4 MG SL tablet, Place 1 tablet under the tongue every 5 minutes as needed for Chest pain, Disp: 25 tablet, Rfl: 1  potassium chloride (KLOR-CON M) 20 MEQ extended release tablet, Take 10 mEq by mouth daily , Disp: , Rfl:   Multiple Vitamins-Minerals (MULTI FOR HER 50+ PO), Take 1 tablet by mouth daily, Disp: , Rfl:     No current facility-administered medications for this visit.       ---------------------------               08/08/18                      6897 Detail Level: Detailed Topical Sulfur Applications Counseling: Topical Sulfur Counseling: Patient counseled that this medication may cause skin irritation or allergic reactions.  In the event of skin irritation, the patient was advised to reduce the amount of the drug applied or use it less frequently.   The patient verbalized understanding of the proper use and possible adverse effects of topical sulfur application.  All of the patient's questions and concerns were addressed. Benzoyl Peroxide Pregnancy And Lactation Text: This medication is Pregnancy Category C. It is unknown if benzoyl peroxide is excreted in breast milk. Winlevi Counseling:  I discussed with the patient the risks of topical clascoterone including but not limited to erythema, scaling, itching, and stinging. Patient voiced their understanding. Minocycline Pregnancy And Lactation Text: This medication is Pregnancy Category D and not consider safe during pregnancy. It is also excreted in breast milk. Aklief counseling:  Patient advised to apply a pea-sized amount only at bedtime and wait 30 minutes after washing their face before applying.  If too drying, patient may add a non-comedogenic moisturizer.  The most commonly reported side effects including irritation, redness, scaling, dryness, stinging, burning, itching, and increased risk of sunburn.  The patient verbalized understanding of the proper use and possible adverse effects of retinoids.  All of the patient's questions and concerns were addressed. Topical Retinoid Pregnancy And Lactation Text: This medication is Pregnancy Category C. It is unknown if this medication is excreted in breast milk. Bactrim Pregnancy And Lactation Text: This medication is Pregnancy Category D and is known to cause fetal risk.  It is also excreted in breast milk. Erythromycin Counseling:  I discussed with the patient the risks of erythromycin including but not limited to GI upset, allergic reaction, drug rash, diarrhea, increase in liver enzymes, and yeast infections. Doxycycline Counseling:  Patient counseled regarding possible photosensitivity and increased risk for sunburn.  Patient instructed to avoid sunlight, if possible.  When exposed to sunlight, patients should wear protective clothing, sunglasses, and sunscreen.  The patient was instructed to call the office immediately if the following severe adverse effects occur:  hearing changes, easy bruising/bleeding, severe headache, or vision changes.  The patient verbalized understanding of the proper use and possible adverse effects of doxycycline.  All of the patient's questions and concerns were addressed. Azithromycin Pregnancy And Lactation Text: This medication is considered safe during pregnancy and is also secreted in breast milk. High Dose Vitamin A Pregnancy And Lactation Text: High dose vitamin A therapy is contraindicated during pregnancy and breast feeding. Tetracycline Counseling: Patient counseled regarding possible photosensitivity and increased risk for sunburn.  Patient instructed to avoid sunlight, if possible.  When exposed to sunlight, patients should wear protective clothing, sunglasses, and sunscreen.  The patient was instructed to call the office immediately if the following severe adverse effects occur:  hearing changes, easy bruising/bleeding, severe headache, or vision changes.  The patient verbalized understanding of the proper use and possible adverse effects of tetracycline.  All of the patient's questions and concerns were addressed. Patient understands to avoid pregnancy while on therapy due to potential birth defects. Use Enhanced Medication Counseling?: No Isotretinoin Pregnancy And Lactation Text: This medication is Pregnancy Category X and is considered extremely dangerous during pregnancy. It is unknown if it is excreted in breast milk. Spironolactone Counseling: Patient advised regarding risks of diarrhea, abdominal pain, hyperkalemia, birth defects (for female patients), liver toxicity and renal toxicity. The patient may need blood work to monitor liver and kidney function and potassium levels while on therapy. The patient verbalized understanding of the proper use and possible adverse effects of spironolactone.  All of the patient's questions and concerns were addressed. Azelaic Acid Pregnancy And Lactation Text: This medication is considered safe during pregnancy and breast feeding. Topical Clindamycin Counseling: Patient counseled that this medication may cause skin irritation or allergic reactions.  In the event of skin irritation, the patient was advised to reduce the amount of the drug applied or use it less frequently.   The patient verbalized understanding of the proper use and possible adverse effects of clindamycin.  All of the patient's questions and concerns were addressed. Birth Control Pills Counseling: Birth Control Pill Counseling: I discussed with the patient the potential side effects of OCPs including but not limited to increased risk of stroke, heart attack, thrombophlebitis, deep venous thrombosis, hepatic adenomas, breast changes, GI upset, headaches, and depression.  The patient verbalized understanding of the proper use and possible adverse effects of OCPs. All of the patient's questions and concerns were addressed. show Sarecycline Counseling: Patient advised regarding possible photosensitivity and discoloration of the teeth, skin, lips, tongue and gums.  Patient instructed to avoid sunlight, if possible.  When exposed to sunlight, patients should wear protective clothing, sunglasses, and sunscreen.  The patient was instructed to call the office immediately if the following severe adverse effects occur:  hearing changes, easy bruising/bleeding, severe headache, or vision changes.  The patient verbalized understanding of the proper use and possible adverse effects of sarecycline.  All of the patient's questions and concerns were addressed. Aklief Pregnancy And Lactation Text: It is unknown if this medication is safe to use during pregnancy.  It is unknown if this medication is excreted in breast milk.  Breastfeeding women should use the topical cream on the smallest area of the skin for the shortest time needed while breastfeeding.  Do not apply to nipple and areola. Erythromycin Pregnancy And Lactation Text: This medication is Pregnancy Category B and is considered safe during pregnancy. It is also excreted in breast milk. Dapsone Counseling: I discussed with the patient the risks of dapsone including but not limited to hemolytic anemia, agranulocytosis, rashes, methemoglobinemia, kidney failure, peripheral neuropathy, headaches, GI upset, and liver toxicity.  Patients who start dapsone require monitoring including baseline LFTs and weekly CBCs for the first month, then every month thereafter.  The patient verbalized understanding of the proper use and possible adverse effects of dapsone.  All of the patient's questions and concerns were addressed. Tazorac Counseling:  Patient advised that medication is irritating and drying.  Patient may need to apply sparingly and wash off after an hour before eventually leaving it on overnight.  The patient verbalized understanding of the proper use and possible adverse effects of tazorac.  All of the patient's questions and concerns were addressed. Winlevi Pregnancy And Lactation Text: This medication is considered safe during pregnancy and breastfeeding. Moisturizer Recommendations: Cerave Minocycline Counseling: Patient advised regarding possible photosensitivity and discoloration of the teeth, skin, lips, tongue and gums.  Patient instructed to avoid sunlight, if possible.  When exposed to sunlight, patients should wear protective clothing, sunglasses, and sunscreen.  The patient was instructed to call the office immediately if the following severe adverse effects occur:  hearing changes, easy bruising/bleeding, severe headache, or vision changes.  The patient verbalized understanding of the proper use and possible adverse effects of minocycline.  All of the patient's questions and concerns were addressed. Doxycycline Pregnancy And Lactation Text: This medication is Pregnancy Category D and not consider safe during pregnancy. It is also excreted in breast milk but is considered safe for shorter treatment courses. Bactrim Counseling:  I discussed with the patient the risks of sulfa antibiotics including but not limited to GI upset, allergic reaction, drug rash, diarrhea, dizziness, photosensitivity, and yeast infections.  Rarely, more serious reactions can occur including but not limited to aplastic anemia, agranulocytosis, methemoglobinemia, blood dyscrasias, liver or kidney failure, lung infiltrates or desquamative/blistering drug rashes. Topical Retinoid counseling:  Patient advised to apply a pea-sized amount only at bedtime and wait 30 minutes after washing their face before applying.  If too drying, patient may add a non-comedogenic moisturizer. The patient verbalized understanding of the proper use and possible adverse effects of retinoids.  All of the patient's questions and concerns were addressed. Topical Sulfur Applications Pregnancy And Lactation Text: This medication is Pregnancy Category C and has an unknown safety profile during pregnancy. It is unknown if this topical medication is excreted in breast milk. Benzoyl Peroxide Counseling: Patient counseled that medicine may cause skin irritation and bleach clothing.  In the event of skin irritation, the patient was advised to reduce the amount of the drug applied or use it less frequently.   The patient verbalized understanding of the proper use and possible adverse effects of benzoyl peroxide.  All of the patient's questions and concerns were addressed. Spironolactone Pregnancy And Lactation Text: This medication can cause feminization of the male fetus and should be avoided during pregnancy. The active metabolite is also found in breast milk. Topical Clindamycin Pregnancy And Lactation Text: This medication is Pregnancy Category B and is considered safe during pregnancy. It is unknown if it is excreted in breast milk. Isotretinoin Counseling: Patient should get monthly blood tests, not donate blood, not drive at night if vision affected, not share medication, and not undergo elective surgery for 6 months after tx completed. Side effects reviewed, pt to contact office should one occur. Azelaic Acid Counseling: Patient counseled that medicine may cause skin irritation and to avoid applying near the eyes.  In the event of skin irritation, the patient was advised to reduce the amount of the drug applied or use it less frequently.   The patient verbalized understanding of the proper use and possible adverse effects of azelaic acid.  All of the patient's questions and concerns were addressed. Tazorac Pregnancy And Lactation Text: This medication is not safe during pregnancy. It is unknown if this medication is excreted in breast milk. Azithromycin Counseling:  I discussed with the patient the risks of azithromycin including but not limited to GI upset, allergic reaction, drug rash, diarrhea, and yeast infections. High Dose Vitamin A Counseling: Side effects reviewed, pt to contact office should one occur. Dapsone Pregnancy And Lactation Text: This medication is Pregnancy Category C and is not considered safe during pregnancy or breast feeding. Birth Control Pills Pregnancy And Lactation Text: This medication should be avoided if pregnant and for the first 30 days post-partum.

## (undated) DEVICE — SHEET,DRAPE,53X77,STERILE: Brand: MEDLINE

## (undated) DEVICE — BITE BLOCK ENDOSCP AD 60 FR W/ ADJ STRP PLAS GRN BLOX

## (undated) DEVICE — CAPSULE ENDOSCP L26.2MM DIA11.4MM BIOCOMPATIBLE PLAS SB 3

## (undated) DEVICE — ELECTRODE BLDE L6.5IN CAUT EXT DISP

## (undated) DEVICE — SEALER LAP L37CM MARYLAND JAW OPN NANO COAT MULTIFUNCTIONAL

## (undated) DEVICE — SUTURE VCRL + SZ 4-0 L18IN ABSRB UD L19MM PS-2 3/8 CIR PRIM VCP496H

## (undated) DEVICE — TOWEL,OR,DSP,ST,BLUE,STD,4/PK,20PK/CS: Brand: MEDLINE

## (undated) DEVICE — ENDOSCOPY KIT: Brand: MEDLINE INDUSTRIES, INC.

## (undated) DEVICE — REPLAY HEMOSTASIS CLIP, 16MM SPAN: Brand: REPLAY

## (undated) DEVICE — GARMENT COMPR STD FOR 17IN CALF UNIF THER FLOTRN

## (undated) DEVICE — PAD PT POS 36 IN SURGYPAD DISP

## (undated) DEVICE — FIAPC® PROBE W/ FILTER 2200 A OD 2.3MM/6.9FR; L 2.2M/7.2FT: Brand: ERBE

## (undated) DEVICE — TROCAR: Brand: KII SHIELDED BLADED ACCESS SYSTEM

## (undated) DEVICE — SUTURE PERMAHAND SZ 3-0 L30IN NONABSORBABLE BLK SH L26MM K832H

## (undated) DEVICE — GENERAL LAPAROSCOPY: Brand: MEDLINE INDUSTRIES, INC.

## (undated) DEVICE — INSUFFLATION NEEDLE TO ESTABLISH PNEUMOPERITONEUM.: Brand: INSUFFLATION NEEDLE

## (undated) DEVICE — SPONGE GZ W4XL4IN COT 12 PLY TYP VII WVN C FLD DSGN STERILE

## (undated) DEVICE — SYRINGE IRRIG 60ML SFT PLIABLE BLB EZ TO GRP 1 HND USE W/

## (undated) DEVICE — COVER,TABLE,77X90,STERILE: Brand: MEDLINE

## (undated) DEVICE — Device

## (undated) DEVICE — SUTURE PDS II SZ 0 L60IN ABSRB VLT L48MM CTX 1/2 CIR Z990G

## (undated) DEVICE — STERILE SURGICAL LUBRICANT, METAL TUBE: Brand: SURGILUBE

## (undated) DEVICE — COVER LT HNDL BLU PLAS

## (undated) DEVICE — SYRINGE 20ML LL S/C 50

## (undated) DEVICE — SPONGE LAP W18XL18IN WHT COT 4 PLY FLD STRUNG RADPQ DISP ST 2 PER PACK

## (undated) DEVICE — PREMIUM DRY TRAY LF: Brand: MEDLINE INDUSTRIES, INC.

## (undated) DEVICE — SYSTEM SKIN CLSR 60CM 2-OCTYL CYNOACRLT W/ MESH DISPNS

## (undated) DEVICE — STAPLER SKIN H3.9MM WIRE DIA0.58MM CRWN 6.9MM 35 STPL FIX

## (undated) DEVICE — HYPODERMIC SAFETY NEEDLE: Brand: MAGELLAN

## (undated) DEVICE — COUNTER NDL 40 COUNT HLD 70 NUM FOAM BLK SGL MAG W BLDE REMV

## (undated) DEVICE — LEGGINGS, PAIR, CLEAR, STERILE: Brand: MEDLINE

## (undated) DEVICE — ELECTRODE PT RET AD L9FT HI MOIST COND ADH HYDRGEL CORDED

## (undated) DEVICE — ACCESS PLATFORM FOR MINIMALLY INVASIVE SURGERY.: Brand: GELPORT® LAPAROSCOPIC  SYSTEM

## (undated) DEVICE — COVER,MAYO STAND,STERILE: Brand: MEDLINE

## (undated) DEVICE — SET INSUF TUBE HEAT ISO CONN DISP

## (undated) DEVICE — 1LYRTR 16FR10ML100%SIL UMS SNP: Brand: MEDLINE INDUSTRIES, INC.

## (undated) DEVICE — TUBING, SUCTION, 1/4" X 12', STRAIGHT: Brand: MEDLINE

## (undated) DEVICE — GOWN SIRUS NONREIN XL W/TWL: Brand: MEDLINE INDUSTRIES, INC.

## (undated) DEVICE — GLOVE SURG SZ 8 L12IN FNGR THK79MIL GRN LTX FREE

## (undated) DEVICE — SUTURE PERMA-HAND SZ 2-0 L30IN NONABSORBABLE BLK L26MM SH K833H

## (undated) DEVICE — SOLUTION IRRIG 3000ML 0.9% SOD CHL USP UROMATIC PLAS CONT

## (undated) DEVICE — SOLUTION PREP PAINT POV IOD FOR SKIN MUCOUS MEM

## (undated) DEVICE — SUTURE VCRL + SZ 2-0 L27IN ABSRB CLR CT-1 1/2 CIR TAPERCUT VCP259H

## (undated) DEVICE — RELOAD STPL L45MM H2-4.1MM THCK TISS GRN GRIPPING SURF B

## (undated) DEVICE — SUTURE PERMAHAND SZ 2-0 L18IN NONABSORBABLE BLK L26MM SH C012D

## (undated) DEVICE — DRAPE,MINOR PROC,6X6 FEN, STER: Brand: MEDLINE

## (undated) DEVICE — ADHESIVE SKIN CLSR 0.7ML TOP DERMBND ADV

## (undated) DEVICE — NEPTUNE E-SEP SMOKE EVACUATION PENCIL, COATED, 70MM BLADE, PUSH BUTTON SWITCH: Brand: NEPTUNE E-SEP

## (undated) DEVICE — 30977 SEE SHARP - ENHANCED INTRAOPERATIVE LAPAROSCOPE CLEANING & DEFOGGING: Brand: 30977 SEE SHARP - ENHANCED INTRAOPERATIVE LAPAROSCOPE CLEANING & DEFOGGING

## (undated) DEVICE — YANKAUER,BULB TIP,W/O VENT,RIGID,STERILE: Brand: MEDLINE

## (undated) DEVICE — 3M™ IOBAN™ 2 ANTIMICROBIAL INCISE DRAPE 6650EZ: Brand: IOBAN™ 2

## (undated) DEVICE — GLOVE ORANGE PI 7 1/2   MSG9075

## (undated) DEVICE — TROCAR: Brand: KII SLEEVE

## (undated) DEVICE — GOWN,AURORA,NONREINF,RAGLAN,XXL,STERILE: Brand: MEDLINE